# Patient Record
Sex: FEMALE | Race: WHITE | HISPANIC OR LATINO | Employment: OTHER | URBAN - METROPOLITAN AREA
[De-identification: names, ages, dates, MRNs, and addresses within clinical notes are randomized per-mention and may not be internally consistent; named-entity substitution may affect disease eponyms.]

---

## 2018-12-14 ENCOUNTER — HOSPITAL ENCOUNTER (INPATIENT)
Facility: MEDICAL CENTER | Age: 68
LOS: 12 days | DRG: 516 | End: 2018-12-26
Attending: FAMILY MEDICINE | Admitting: HOSPITALIST
Payer: MEDICARE

## 2018-12-14 ENCOUNTER — HOSPITAL ENCOUNTER (OUTPATIENT)
Dept: RADIOLOGY | Facility: MEDICAL CENTER | Age: 68
End: 2018-12-14

## 2018-12-14 ENCOUNTER — HOSPITAL ENCOUNTER (OUTPATIENT)
Facility: MEDICAL CENTER | Age: 68
DRG: 516 | End: 2018-12-14
Attending: FAMILY MEDICINE | Admitting: FAMILY MEDICINE
Payer: MEDICARE

## 2018-12-14 ENCOUNTER — APPOINTMENT (OUTPATIENT)
Dept: RADIOLOGY | Facility: MEDICAL CENTER | Age: 68
DRG: 516 | End: 2018-12-14
Attending: ORTHOPAEDIC SURGERY
Payer: MEDICARE

## 2018-12-14 DIAGNOSIS — E66.9 DIABETES MELLITUS TYPE 2 IN OBESE (HCC): ICD-10-CM

## 2018-12-14 DIAGNOSIS — S32.10XA CLOSED FRACTURE OF SACRUM, UNSPECIFIED PORTION OF SACRUM, INITIAL ENCOUNTER (HCC): ICD-10-CM

## 2018-12-14 DIAGNOSIS — J44.9 CHRONIC OBSTRUCTIVE PULMONARY DISEASE, UNSPECIFIED COPD TYPE (HCC): ICD-10-CM

## 2018-12-14 DIAGNOSIS — G25.81 RLS (RESTLESS LEGS SYNDROME): ICD-10-CM

## 2018-12-14 DIAGNOSIS — R74.8 ELEVATED ALKALINE PHOSPHATASE LEVEL: ICD-10-CM

## 2018-12-14 DIAGNOSIS — E11.69 DIABETES MELLITUS TYPE 2 IN OBESE (HCC): ICD-10-CM

## 2018-12-14 DIAGNOSIS — G89.29 OTHER CHRONIC PAIN: ICD-10-CM

## 2018-12-14 DIAGNOSIS — Z85.3 HISTORY OF BREAST CANCER: ICD-10-CM

## 2018-12-14 PROBLEM — N39.0 UTI (URINARY TRACT INFECTION): Status: ACTIVE | Noted: 2018-12-14

## 2018-12-14 PROBLEM — K21.9 GERD (GASTROESOPHAGEAL REFLUX DISEASE): Status: ACTIVE | Noted: 2018-12-14

## 2018-12-14 LAB
APTT PPP: 36.4 SEC (ref 24.7–36)
GLUCOSE BLD-MCNC: 121 MG/DL (ref 65–99)
GLUCOSE BLD-MCNC: 89 MG/DL (ref 65–99)
INR PPP: 1.05 (ref 0.87–1.13)
MAGNESIUM SERPL-MCNC: 1.6 MG/DL (ref 1.5–2.5)
PROTHROMBIN TIME: 13.8 SEC (ref 12–14.6)
TSH SERPL DL<=0.005 MIU/L-ACNC: 3.21 UIU/ML (ref 0.38–5.33)

## 2018-12-14 PROCEDURE — 99222 1ST HOSP IP/OBS MODERATE 55: CPT | Mod: AI | Performed by: HOSPITALIST

## 2018-12-14 PROCEDURE — 72192 CT PELVIS W/O DYE: CPT

## 2018-12-14 PROCEDURE — A9270 NON-COVERED ITEM OR SERVICE: HCPCS | Performed by: HOSPITALIST

## 2018-12-14 PROCEDURE — 770006 HCHG ROOM/CARE - MED/SURG/GYN SEMI*

## 2018-12-14 PROCEDURE — 700111 HCHG RX REV CODE 636 W/ 250 OVERRIDE (IP): Performed by: HOSPITALIST

## 2018-12-14 PROCEDURE — 700102 HCHG RX REV CODE 250 W/ 637 OVERRIDE(OP): Performed by: HOSPITALIST

## 2018-12-14 PROCEDURE — 700105 HCHG RX REV CODE 258

## 2018-12-14 PROCEDURE — 85730 THROMBOPLASTIN TIME PARTIAL: CPT

## 2018-12-14 PROCEDURE — 36415 COLL VENOUS BLD VENIPUNCTURE: CPT

## 2018-12-14 PROCEDURE — 87086 URINE CULTURE/COLONY COUNT: CPT

## 2018-12-14 PROCEDURE — 700105 HCHG RX REV CODE 258: Performed by: HOSPITALIST

## 2018-12-14 PROCEDURE — 85610 PROTHROMBIN TIME: CPT

## 2018-12-14 PROCEDURE — 83735 ASSAY OF MAGNESIUM: CPT

## 2018-12-14 PROCEDURE — 82962 GLUCOSE BLOOD TEST: CPT | Mod: 91

## 2018-12-14 PROCEDURE — 84443 ASSAY THYROID STIM HORMONE: CPT

## 2018-12-14 PROCEDURE — 83036 HEMOGLOBIN GLYCOSYLATED A1C: CPT

## 2018-12-14 RX ORDER — FUROSEMIDE 40 MG/1
40 TABLET ORAL 2 TIMES DAILY
Status: DISCONTINUED | OUTPATIENT
Start: 2018-12-14 | End: 2018-12-26 | Stop reason: HOSPADM

## 2018-12-14 RX ORDER — DEXTROSE MONOHYDRATE 25 G/50ML
25 INJECTION, SOLUTION INTRAVENOUS
Status: DISCONTINUED | OUTPATIENT
Start: 2018-12-14 | End: 2018-12-26 | Stop reason: HOSPADM

## 2018-12-14 RX ORDER — LOSARTAN POTASSIUM 50 MG/1
50 TABLET ORAL DAILY
Status: ON HOLD | COMMUNITY
End: 2019-05-29

## 2018-12-14 RX ORDER — TIZANIDINE 4 MG/1
2 TABLET ORAL 2 TIMES DAILY
Status: DISCONTINUED | OUTPATIENT
Start: 2018-12-14 | End: 2018-12-24

## 2018-12-14 RX ORDER — GABAPENTIN 300 MG/1
300 CAPSULE ORAL 2 TIMES DAILY
Status: ON HOLD | COMMUNITY
End: 2019-05-29

## 2018-12-14 RX ORDER — PRAVASTATIN SODIUM 10 MG
40 TABLET ORAL NIGHTLY
Status: DISCONTINUED | OUTPATIENT
Start: 2018-12-14 | End: 2018-12-26 | Stop reason: HOSPADM

## 2018-12-14 RX ORDER — SODIUM CHLORIDE 9 MG/ML
INJECTION, SOLUTION INTRAVENOUS
Status: COMPLETED
Start: 2018-12-14 | End: 2018-12-14

## 2018-12-14 RX ORDER — BISACODYL 10 MG
10 SUPPOSITORY, RECTAL RECTAL
Status: DISCONTINUED | OUTPATIENT
Start: 2018-12-14 | End: 2018-12-17

## 2018-12-14 RX ORDER — OMEPRAZOLE 20 MG/1
20 CAPSULE, DELAYED RELEASE ORAL DAILY
Status: ON HOLD | COMMUNITY
End: 2019-05-23

## 2018-12-14 RX ORDER — OMEPRAZOLE 20 MG/1
20 CAPSULE, DELAYED RELEASE ORAL DAILY
Status: DISCONTINUED | OUTPATIENT
Start: 2018-12-14 | End: 2018-12-26 | Stop reason: HOSPADM

## 2018-12-14 RX ORDER — HYDROCODONE BITARTRATE AND ACETAMINOPHEN 10; 325 MG/1; MG/1
1 TABLET ORAL EVERY 6 HOURS PRN
Status: DISCONTINUED | OUTPATIENT
Start: 2018-12-14 | End: 2018-12-15

## 2018-12-14 RX ORDER — ACETAMINOPHEN 325 MG/1
650 TABLET ORAL EVERY 6 HOURS PRN
Status: DISCONTINUED | OUTPATIENT
Start: 2018-12-14 | End: 2018-12-26 | Stop reason: HOSPADM

## 2018-12-14 RX ORDER — PRAMIPEXOLE DIHYDROCHLORIDE 0.25 MG/1
0.5 TABLET ORAL PRN
Status: ON HOLD | COMMUNITY
End: 2019-05-29

## 2018-12-14 RX ORDER — LOSARTAN POTASSIUM 50 MG/1
50 TABLET ORAL DAILY
Status: DISCONTINUED | OUTPATIENT
Start: 2018-12-14 | End: 2018-12-26 | Stop reason: HOSPADM

## 2018-12-14 RX ORDER — ONDANSETRON 4 MG/1
4 TABLET, ORALLY DISINTEGRATING ORAL EVERY 4 HOURS PRN
Status: DISCONTINUED | OUTPATIENT
Start: 2018-12-14 | End: 2018-12-26 | Stop reason: HOSPADM

## 2018-12-14 RX ORDER — GABAPENTIN 300 MG/1
300 CAPSULE ORAL 2 TIMES DAILY
Status: DISCONTINUED | OUTPATIENT
Start: 2018-12-14 | End: 2018-12-24

## 2018-12-14 RX ORDER — ONDANSETRON 2 MG/ML
4 INJECTION INTRAMUSCULAR; INTRAVENOUS EVERY 4 HOURS PRN
Status: DISCONTINUED | OUTPATIENT
Start: 2018-12-14 | End: 2018-12-17

## 2018-12-14 RX ORDER — PRAVASTATIN SODIUM 20 MG
40 TABLET ORAL NIGHTLY
Status: ON HOLD | COMMUNITY
End: 2019-05-23

## 2018-12-14 RX ORDER — PRAMIPEXOLE DIHYDROCHLORIDE 0.25 MG/1
0.25 TABLET ORAL NIGHTLY PRN
Status: DISCONTINUED | OUTPATIENT
Start: 2018-12-14 | End: 2018-12-24

## 2018-12-14 RX ORDER — FUROSEMIDE 40 MG/1
40 TABLET ORAL 2 TIMES DAILY
Status: ON HOLD | COMMUNITY
End: 2019-04-30

## 2018-12-14 RX ORDER — AMOXICILLIN 250 MG
2 CAPSULE ORAL 2 TIMES DAILY
Status: DISCONTINUED | OUTPATIENT
Start: 2018-12-14 | End: 2018-12-17

## 2018-12-14 RX ORDER — NAPROXEN 500 MG/1
500 TABLET ORAL
Status: ON HOLD | COMMUNITY
End: 2018-12-26

## 2018-12-14 RX ORDER — TRIAMCINOLONE ACETONIDE 1 MG/G
CREAM TOPICAL 2 TIMES DAILY
Status: ON HOLD | COMMUNITY
End: 2018-12-26

## 2018-12-14 RX ORDER — HYDROCODONE BITARTRATE AND ACETAMINOPHEN 10; 325 MG/1; MG/1
1 TABLET ORAL EVERY 6 HOURS PRN
Status: ON HOLD | COMMUNITY
End: 2018-12-26

## 2018-12-14 RX ORDER — TIZANIDINE 4 MG/1
2 TABLET ORAL 2 TIMES DAILY
Status: ON HOLD | COMMUNITY
End: 2019-04-30

## 2018-12-14 RX ORDER — POLYETHYLENE GLYCOL 3350 17 G/17G
1 POWDER, FOR SOLUTION ORAL
Status: DISCONTINUED | OUTPATIENT
Start: 2018-12-14 | End: 2018-12-17

## 2018-12-14 RX ADMIN — GABAPENTIN 300 MG: 300 CAPSULE ORAL at 16:40

## 2018-12-14 RX ADMIN — FUROSEMIDE 40 MG: 40 TABLET ORAL at 16:42

## 2018-12-14 RX ADMIN — OMEPRAZOLE 20 MG: 20 CAPSULE, DELAYED RELEASE ORAL at 15:43

## 2018-12-14 RX ADMIN — SODIUM CHLORIDE 500 ML: 9 INJECTION, SOLUTION INTRAVENOUS at 15:50

## 2018-12-14 RX ADMIN — TIZANIDINE 2 MG: 4 TABLET ORAL at 16:41

## 2018-12-14 RX ADMIN — LOSARTAN POTASSIUM 50 MG: 50 TABLET ORAL at 15:47

## 2018-12-14 RX ADMIN — PRAVASTATIN SODIUM 40 MG: 10 TABLET ORAL at 20:38

## 2018-12-14 RX ADMIN — HYDROCODONE BITARTRATE AND ACETAMINOPHEN 1 TABLET: 10; 325 TABLET ORAL at 15:42

## 2018-12-14 RX ADMIN — ENOXAPARIN SODIUM 40 MG: 100 INJECTION SUBCUTANEOUS at 15:35

## 2018-12-14 RX ADMIN — CEFTRIAXONE SODIUM 2 G: 2 INJECTION, POWDER, FOR SOLUTION INTRAMUSCULAR; INTRAVENOUS at 15:36

## 2018-12-14 RX ADMIN — STANDARDIZED SENNA CONCENTRATE AND DOCUSATE SODIUM 2 TABLET: 8.6; 5 TABLET, FILM COATED ORAL at 16:41

## 2018-12-14 ASSESSMENT — LIFESTYLE VARIABLES
CONSUMPTION TOTAL: NEGATIVE
ALCOHOL_USE: YES
HAVE PEOPLE ANNOYED YOU BY CRITICIZING YOUR DRINKING: NO
HOW MANY TIMES IN THE PAST YEAR HAVE YOU HAD 5 OR MORE DRINKS IN A DAY: 0
TOTAL SCORE: 0
TOTAL SCORE: 0
AVERAGE NUMBER OF DAYS PER WEEK YOU HAVE A DRINK CONTAINING ALCOHOL: 0
EVER HAD A DRINK FIRST THING IN THE MORNING TO STEADY YOUR NERVES TO GET RID OF A HANGOVER: NO
HAVE YOU EVER FELT YOU SHOULD CUT DOWN ON YOUR DRINKING: NO
EVER FELT BAD OR GUILTY ABOUT YOUR DRINKING: NO
TOTAL SCORE: 0
ON A TYPICAL DAY WHEN YOU DRINK ALCOHOL HOW MANY DRINKS DO YOU HAVE: 0

## 2018-12-14 ASSESSMENT — COGNITIVE AND FUNCTIONAL STATUS - GENERAL
HELP NEEDED FOR BATHING: A LITTLE
CLIMB 3 TO 5 STEPS WITH RAILING: TOTAL
PERSONAL GROOMING: A LITTLE
DRESSING REGULAR LOWER BODY CLOTHING: A LITTLE
SUGGESTED CMS G CODE MODIFIER MOBILITY: CM
TURNING FROM BACK TO SIDE WHILE IN FLAT BAD: A LITTLE
DRESSING REGULAR UPPER BODY CLOTHING: A LOT
MOBILITY SCORE: 8
WALKING IN HOSPITAL ROOM: TOTAL
DAILY ACTIVITIY SCORE: 17
SUGGESTED CMS G CODE MODIFIER DAILY ACTIVITY: CK
MOVING TO AND FROM BED TO CHAIR: UNABLE
TOILETING: A LOT
MOVING FROM LYING ON BACK TO SITTING ON SIDE OF FLAT BED: UNABLE
STANDING UP FROM CHAIR USING ARMS: TOTAL

## 2018-12-14 ASSESSMENT — PATIENT HEALTH QUESTIONNAIRE - PHQ9
SUM OF ALL RESPONSES TO PHQ9 QUESTIONS 1 AND 2: 0
1. LITTLE INTEREST OR PLEASURE IN DOING THINGS: NOT AT ALL
2. FEELING DOWN, DEPRESSED, IRRITABLE, OR HOPELESS: NOT AT ALL

## 2018-12-14 ASSESSMENT — PAIN SCALES - GENERAL
PAINLEVEL_OUTOF10: 6
PAINLEVEL_OUTOF10: 5
PAINLEVEL_OUTOF10: 8

## 2018-12-14 NOTE — PROGRESS NOTES
Direct admit from [facility], referred by Dr. Zuulagas. For pelvic fracture. Admitting MD is Dr. Loredo. Dr. Porter is consulting. ADT order signed and held, to be released upon patient's arrival on the unit. Patient arriving via ground. Page admit hospitalist on call for orders upon patient's arrival.

## 2018-12-15 ENCOUNTER — APPOINTMENT (OUTPATIENT)
Dept: RADIOLOGY | Facility: MEDICAL CENTER | Age: 68
DRG: 516 | End: 2018-12-15
Attending: HOSPITALIST
Payer: MEDICARE

## 2018-12-15 PROBLEM — R74.8 ELEVATED ALKALINE PHOSPHATASE LEVEL: Status: ACTIVE | Noted: 2018-12-15

## 2018-12-15 LAB
ALBUMIN SERPL BCP-MCNC: 3.1 G/DL (ref 3.2–4.9)
ALBUMIN/GLOB SERPL: 0.9 G/DL
ALP SERPL-CCNC: 170 U/L (ref 30–99)
ALT SERPL-CCNC: 23 U/L (ref 2–50)
ANION GAP SERPL CALC-SCNC: 5 MMOL/L (ref 0–11.9)
ANISOCYTOSIS BLD QL SMEAR: ABNORMAL
AST SERPL-CCNC: 35 U/L (ref 12–45)
BASOPHILS # BLD AUTO: 0.8 % (ref 0–1.8)
BASOPHILS # BLD: 0.05 K/UL (ref 0–0.12)
BILIRUB SERPL-MCNC: 0.4 MG/DL (ref 0.1–1.5)
BUN SERPL-MCNC: 13 MG/DL (ref 8–22)
CALCIUM SERPL-MCNC: 9.2 MG/DL (ref 8.5–10.5)
CHLORIDE SERPL-SCNC: 96 MMOL/L (ref 96–112)
CHOLEST SERPL-MCNC: 104 MG/DL (ref 100–199)
CO2 SERPL-SCNC: 29 MMOL/L (ref 20–33)
CREAT SERPL-MCNC: 0.72 MG/DL (ref 0.5–1.4)
DACRYOCYTES BLD QL SMEAR: NORMAL
EOSINOPHIL # BLD AUTO: 0.19 K/UL (ref 0–0.51)
EOSINOPHIL NFR BLD: 3.4 % (ref 0–6.9)
ERYTHROCYTE [DISTWIDTH] IN BLOOD BY AUTOMATED COUNT: 47.9 FL (ref 35.9–50)
EST. AVERAGE GLUCOSE BLD GHB EST-MCNC: 134 MG/DL
GLOBULIN SER CALC-MCNC: 3.5 G/DL (ref 1.9–3.5)
GLUCOSE BLD-MCNC: 124 MG/DL (ref 65–99)
GLUCOSE BLD-MCNC: 146 MG/DL (ref 65–99)
GLUCOSE BLD-MCNC: 89 MG/DL (ref 65–99)
GLUCOSE BLD-MCNC: 95 MG/DL (ref 65–99)
GLUCOSE SERPL-MCNC: 114 MG/DL (ref 65–99)
HBA1C MFR BLD: 6.3 % (ref 0–5.6)
HCT VFR BLD AUTO: 31.4 % (ref 37–47)
HDLC SERPL-MCNC: 29 MG/DL
HGB BLD-MCNC: 9.7 G/DL (ref 12–16)
LDLC SERPL CALC-MCNC: 53 MG/DL
LYMPHOCYTES # BLD AUTO: 0.88 K/UL (ref 1–4.8)
LYMPHOCYTES NFR BLD: 15.5 % (ref 22–41)
MANUAL DIFF BLD: NORMAL
MCH RBC QN AUTO: 24.8 PG (ref 27–33)
MCHC RBC AUTO-ENTMCNC: 30.9 G/DL (ref 33.6–35)
MCV RBC AUTO: 80.3 FL (ref 81.4–97.8)
METAMYELOCYTES NFR BLD MANUAL: 0.9 %
MICROCYTES BLD QL SMEAR: ABNORMAL
MONOCYTES # BLD AUTO: 0.3 K/UL (ref 0–0.85)
MONOCYTES NFR BLD AUTO: 5.2 % (ref 0–13.4)
MORPHOLOGY BLD-IMP: NORMAL
MYELOCYTES NFR BLD MANUAL: 0.9 %
NEUTROPHILS # BLD AUTO: 4.18 K/UL (ref 2–7.15)
NEUTROPHILS NFR BLD: 73.3 % (ref 44–72)
NRBC # BLD AUTO: 0 K/UL
NRBC BLD-RTO: 0 /100 WBC
OVALOCYTES BLD QL SMEAR: NORMAL
PLATELET # BLD AUTO: 287 K/UL (ref 164–446)
PLATELET BLD QL SMEAR: NORMAL
PMV BLD AUTO: 9.7 FL (ref 9–12.9)
POLYCHROMASIA BLD QL SMEAR: NORMAL
POTASSIUM SERPL-SCNC: 4.2 MMOL/L (ref 3.6–5.5)
PROT SERPL-MCNC: 6.6 G/DL (ref 6–8.2)
RBC # BLD AUTO: 3.91 M/UL (ref 4.2–5.4)
RBC BLD AUTO: PRESENT
SODIUM SERPL-SCNC: 130 MMOL/L (ref 135–145)
TRIGL SERPL-MCNC: 111 MG/DL (ref 0–149)
WBC # BLD AUTO: 5.7 K/UL (ref 4.8–10.8)

## 2018-12-15 PROCEDURE — 80053 COMPREHEN METABOLIC PANEL: CPT

## 2018-12-15 PROCEDURE — 85027 COMPLETE CBC AUTOMATED: CPT

## 2018-12-15 PROCEDURE — 700102 HCHG RX REV CODE 250 W/ 637 OVERRIDE(OP): Performed by: HOSPITALIST

## 2018-12-15 PROCEDURE — 770006 HCHG ROOM/CARE - MED/SURG/GYN SEMI*

## 2018-12-15 PROCEDURE — A9270 NON-COVERED ITEM OR SERVICE: HCPCS | Performed by: HOSPITALIST

## 2018-12-15 PROCEDURE — 80061 LIPID PANEL: CPT

## 2018-12-15 PROCEDURE — 85007 BL SMEAR W/DIFF WBC COUNT: CPT

## 2018-12-15 PROCEDURE — 700105 HCHG RX REV CODE 258: Performed by: HOSPITALIST

## 2018-12-15 PROCEDURE — 99232 SBSQ HOSP IP/OBS MODERATE 35: CPT | Performed by: HOSPITALIST

## 2018-12-15 PROCEDURE — 36415 COLL VENOUS BLD VENIPUNCTURE: CPT

## 2018-12-15 PROCEDURE — 700111 HCHG RX REV CODE 636 W/ 250 OVERRIDE (IP): Performed by: HOSPITALIST

## 2018-12-15 PROCEDURE — 82962 GLUCOSE BLOOD TEST: CPT

## 2018-12-15 RX ORDER — OXYCODONE HYDROCHLORIDE 10 MG/1
10 TABLET ORAL EVERY 4 HOURS PRN
Status: DISCONTINUED | OUTPATIENT
Start: 2018-12-15 | End: 2018-12-15

## 2018-12-15 RX ORDER — OXYCODONE HYDROCHLORIDE 5 MG/1
5 TABLET ORAL EVERY 4 HOURS PRN
Status: DISCONTINUED | OUTPATIENT
Start: 2018-12-15 | End: 2018-12-15

## 2018-12-15 RX ORDER — OXYCODONE HYDROCHLORIDE 5 MG/1
15 TABLET ORAL EVERY 4 HOURS PRN
Status: DISCONTINUED | OUTPATIENT
Start: 2018-12-15 | End: 2018-12-17

## 2018-12-15 RX ORDER — OXYCODONE HYDROCHLORIDE 10 MG/1
10 TABLET ORAL EVERY 4 HOURS PRN
Status: DISCONTINUED | OUTPATIENT
Start: 2018-12-15 | End: 2018-12-17

## 2018-12-15 RX ADMIN — STANDARDIZED SENNA CONCENTRATE AND DOCUSATE SODIUM 2 TABLET: 8.6; 5 TABLET, FILM COATED ORAL at 05:29

## 2018-12-15 RX ADMIN — FUROSEMIDE 40 MG: 40 TABLET ORAL at 05:29

## 2018-12-15 RX ADMIN — GABAPENTIN 300 MG: 300 CAPSULE ORAL at 17:31

## 2018-12-15 RX ADMIN — TIZANIDINE 2 MG: 4 TABLET ORAL at 17:31

## 2018-12-15 RX ADMIN — HYDROCODONE BITARTRATE AND ACETAMINOPHEN 1 TABLET: 10; 325 TABLET ORAL at 13:31

## 2018-12-15 RX ADMIN — OXYCODONE HYDROCHLORIDE 10 MG: 10 TABLET ORAL at 17:31

## 2018-12-15 RX ADMIN — FUROSEMIDE 40 MG: 40 TABLET ORAL at 17:31

## 2018-12-15 RX ADMIN — ACETAMINOPHEN 650 MG: 325 TABLET, FILM COATED ORAL at 16:21

## 2018-12-15 RX ADMIN — OXYCODONE HYDROCHLORIDE 15 MG: 5 TABLET ORAL at 20:55

## 2018-12-15 RX ADMIN — HYDROCODONE BITARTRATE AND ACETAMINOPHEN 1 TABLET: 10; 325 TABLET ORAL at 01:33

## 2018-12-15 RX ADMIN — HYDROCODONE BITARTRATE AND ACETAMINOPHEN 1 TABLET: 10; 325 TABLET ORAL at 07:33

## 2018-12-15 RX ADMIN — CEFTRIAXONE SODIUM 2 G: 2 INJECTION, POWDER, FOR SOLUTION INTRAMUSCULAR; INTRAVENOUS at 05:29

## 2018-12-15 RX ADMIN — STANDARDIZED SENNA CONCENTRATE AND DOCUSATE SODIUM 2 TABLET: 8.6; 5 TABLET, FILM COATED ORAL at 17:31

## 2018-12-15 RX ADMIN — LOSARTAN POTASSIUM 50 MG: 50 TABLET ORAL at 05:29

## 2018-12-15 RX ADMIN — ENOXAPARIN SODIUM 40 MG: 100 INJECTION SUBCUTANEOUS at 05:29

## 2018-12-15 RX ADMIN — OMEPRAZOLE 20 MG: 20 CAPSULE, DELAYED RELEASE ORAL at 05:29

## 2018-12-15 RX ADMIN — GABAPENTIN 300 MG: 300 CAPSULE ORAL at 05:29

## 2018-12-15 RX ADMIN — PRAVASTATIN SODIUM 40 MG: 10 TABLET ORAL at 20:55

## 2018-12-15 RX ADMIN — TIZANIDINE 2 MG: 4 TABLET ORAL at 05:29

## 2018-12-15 ASSESSMENT — PAIN SCALES - GENERAL
PAINLEVEL_OUTOF10: 8
PAINLEVEL_OUTOF10: 10
PAINLEVEL_OUTOF10: 6
PAINLEVEL_OUTOF10: 7
PAINLEVEL_OUTOF10: 9

## 2018-12-15 ASSESSMENT — ENCOUNTER SYMPTOMS
WHEEZING: 0
SHORTNESS OF BREATH: 0
FEVER: 0
CONSTIPATION: 0
DIARRHEA: 0
HEADACHES: 0
COUGH: 0
CHILLS: 0
NAUSEA: 0
VOMITING: 0

## 2018-12-15 NOTE — PROGRESS NOTES
Transferred for sacral fracture  CT scan with reconstructions ordered to define fracture pattern and determine if surgical intervention required

## 2018-12-15 NOTE — ASSESSMENT & PLAN NOTE
Previously followed with oncologist Dr. Casillas in [facility], last saw them 1-2 years ago.  Ct suspicious for pathologic fx .  Bone scan abnormal uptake n regions of degenerative changes and chronic arthritis.   To follow up with her oncologist. May need further evaluation with PET scan.

## 2018-12-15 NOTE — ASSESSMENT & PLAN NOTE
Bone scan - non specific for metastatic because the increase uptake noted in regions of fx and degenerative areas.    Outpatient follow-up recommended with oncology for possible PET scan.

## 2018-12-15 NOTE — PROGRESS NOTES
[facility] Medicine Daily Progress Note    Date of Service  12/15/2018    Chief Complaint  68 y.o. female admitted 12/14/2018 with right sacral fracture    Interval Problem Update  12/15: CT shows sacral fracture likely pathological.  Bone scan pending.    Disposition  Pending workup of sacral fracture  Patient previously followed with Dr. Casillas in [facility]    Review of Systems  Review of Systems   Constitutional: Negative for chills and fever.   Respiratory: Negative for cough, shortness of breath and wheezing.    Cardiovascular: Negative for chest pain.   Gastrointestinal: Negative for constipation, diarrhea, nausea and vomiting.   Genitourinary: Negative for dysuria.   Musculoskeletal: Positive for joint pain.   Neurological: Negative for headaches.      Physical Exam  Temp:  [36.7 °C (98.1 °F)-37.5 °C (99.5 °F)] 36.9 °C (98.4 °F)  Pulse:  [] 73  Resp:  [15-18] 17  BP: (102-159)/(53-89) 114/56    Physical Exam   Constitutional: She appears well-developed.   HENT:   Head: Normocephalic.   Eyes: Conjunctivae are normal.   Cardiovascular: Tachycardia present.  Exam reveals no gallop.    Pulmonary/Chest: No respiratory distress. She has no wheezes.   Abdominal: She exhibits no distension. There is no tenderness.   Musculoskeletal: She exhibits tenderness.   Neurological: She is alert.       Fluids    Intake/Output Summary (Last 24 hours) at 12/15/18 0956  Last data filed at 12/15/18 0900   Gross per 24 hour   Intake             1010 ml   Output             1400 ml   Net             -390 ml       Laboratory  Recent Labs      12/15/18   0318   WBC  5.7   RBC  3.91*   HEMOGLOBIN  9.7*   HEMATOCRIT  31.4*   MCV  80.3*   MCH  24.8*   MCHC  30.9*   RDW  47.9   PLATELETCT  287   MPV  9.7     Recent Labs      12/15/18   0318   SODIUM  130*   POTASSIUM  4.2   CHLORIDE  96   CO2  29   GLUCOSE  114*   BUN  13   CREATININE  0.72   CALCIUM  9.2     Recent Labs      12/14/18   1528   APTT  36.4*   INR  1.05          Recent Labs      12/15/18   0318   TRIGLYCERIDE  111   HDL  29*   LDL  53       Imaging  CT-PELVIS W/O PLUS RECONS   Final Result      1.  Right sacral fracture highly likely to be pathologic secondary to underlying malignancy      2.  Also, possible abnormal bone within the right ilium which could indicate malignancy.      3.  Osteoarthritis of both hip joint, both sacroiliac joints, lumbar spine facet joint.      4.  Prior hysterectomy      5.  Abdominal aortic atherosclerotic plaque      6.  Colonic diverticulosis      OUTSIDE IMAGES-US VASCULAR   Final Result      CT-FOREIGN FILM CAT SCAN   Final Result      OUTSIDE IMAGES-DX PELVIS   Final Result      NM-BONE SCAN WHOLE BODY    (Results Pending)        Assessment/Plan  Elevated alkaline phosphatase level- (present on admission)   Assessment & Plan    -Possibly due to bone metastases     RLS (restless legs syndrome)- (present on admission)   Assessment & Plan    -Chronic     GERD (gastroesophageal reflux disease)- (present on admission)   Assessment & Plan    -Continue omeprazole     Diabetes mellitus type 2 in obese (HCC)- (present on admission)   Assessment & Plan    -Continue metformin  -Continue sliding scale insulin     Chronic pain- (present on admission)   Assessment & Plan    -Continue gabapentin and tizanidine  -Norco as needed     History of breast cancer- (present on admission)   Assessment & Plan    -Previously followed with oncologist Dr. Casillas in [facility], last saw them 1-2 years ago     COPD (chronic obstructive pulmonary disease) ([facility])- (present on admission)   Assessment & Plan    -Not in exacerbation     UTI (urinary tract infection)- (present on admission)   Assessment & Plan    -Started on ceftriaxone  -Urine culture pending     Sacral fracture, closed (HCC)- (present on admission)   Assessment & Plan    -Confirmed on CT scan  -Orthopedic surgeon Dr. Porter consulted  -Likely pathological from breast cancer metastases  -Bone  scan pending          VTE prophylaxis: enoxaparin

## 2018-12-15 NOTE — PROGRESS NOTES
Patient back on unit. Per nuclear medicine, unable to complete bone scan due to intense pain. Will update MD. Patient resting comfortably in bed, distraction in use.

## 2018-12-15 NOTE — RESPIRATORY CARE
COPD EDUCATION by COPD CLINICAL EDUCATOR  12/15/2018 at 7:23 AM by Shanell Orr     Patient reviewed by COPD education team. Patient does not qualify for COPD program.

## 2018-12-15 NOTE — H&P
DATE OF ADMISSION:  12/14/2018    DATE OF SERVICE:  12/14/2018    IDENTIFICATION:  This is a 68-year-old female from [place], [place].    CHIEF COMPLAINT:  Fall and right hip and pelvic pain.    HISTORY OF PRESENT ILLNESS:  A 68-year-old female who apparently had several   falls was trying to manage with pain medication and took a handful of   medication at once, which made her lethargic.  She had another fall.  The   patient presents to [place] emergency department where the patient is   evaluated and found to have a sacral fracture, which is comminuted.  The   patient was also found with some bone lucencies concerning for possible   pathologic fracture, referred to [facility] for evaluation, especially care with   orthopedic surgery, Dr. Porter on-call.  The patient arrives here as a   direct admission.  The patient is in significant pain and is tearful.  She   states that she has had several falls.  She has had no other injury.  She   states that she had a history of DVT, which was treated.  She does have COPD,   osteoarthritis, chronic pain, dyslipidemia, diabetes and restless leg   syndrome.  Patient will be admitted for further evaluation and treatment.  Dr. Porter notified of the patient's arrival.    ALLERGIES:  No known drug allergies.    MEDICATIONS:  Regimen on admission as follows:  1.  Lasix 40 mg b.i.d. for chronic lower extremity swelling.  2.  Neurontin 300 mg p.o. b.i.d.  3.  Norco 10/325 one tablet q.6 hours p.r.n.  4.  Cozaar 50 mg daily.  5.  Glucophage 500 mg p.o. b.i.d.  6.  Naprosyn 500 mg p.o. b.i.d.  7.  Omeprazole 20 mg daily.  8.  Mirapex 0.25 mg p.r.n.  9.  Pravachol 40 mg daily.  10.  Xarelto, apparently is completed.  11.  Zanaflex 4 mg half tablet b.i.d. p.r.n.  12.  Kenalog cream to affected areas.    PAST MEDICAL HISTORY:  1.  Osteoarthritis.  2.  COPD.  3.  History of DVT.  4.  History of breast cancer 10 years ago, left mastectomy and reconstruction   and antihormonal  therapy.  5.  Chronic pain syndrome.  6.  Hypertension.  7.  Dyslipidemia.  8.  Diabetes type 2.  9.  GERD.  10.  Restless leg syndrome.    PAST SURGICAL HISTORY:  1.  History of left mastectomy and reconstruction.  2.  History of knee surgery.  3.  History of hysterectomy.    SOCIAL HISTORY:  The patient is a nonsmoker, nondrinker.  She states she has a   history of remote drug use.  She is  and lives in [place], [place].    FAMILY HISTORY:  Positive for diabetes in her father with lymphoma.  Her   mother apparently  of old age.    REVIEW OF SYSTEMS:  She denies any fevers, has had some mild chills.  HEENT:  No headache, no visual changes, no difficulty with swallowing or sore   throat.  CARDIOVASCULAR:  Without chest pain, palpitation, PND or orthopnea.  LUNGS:  Without cough.  She has some intermittent mild wheezing.  GASTROINTESTINAL:  No nausea, vomiting, diarrhea.  GENITOURINARY:  No dysuria, hematuria.  She does have some frequency.  NEUROLOGIC:  Without focal weakness, numbness or tingling.  She does have some   sensory loss in lower extremities.  MUSCULOSKELETAL:  With right-sided hip and pelvic pain.    PHYSICAL EXAMINATION:  VITAL SIGNS:  The patient is found to have temperature 36.7, pulse 94,   respirations 16, blood pressure 159/89.  The patient is saturating 93% on room   air.  GENERAL:  This is an elderly female, overweight, in no acute distress, no   acute respiratory distress.  Well developed, well nourished.  HEENT:  Normocephalic, atraumatic.  EOMI.  PERRLA.  Mucous membranes are   moist.  Nasopharynx clear.  NECK:  Stiff range of motion.  No lymphadenopathy or thyromegaly.  CHEST:  Normal bony structures.  LUNGS:  Clear to auscultation anteriorly.  HEART:  Regular rate.  S1 and S2 are normal.  No S3, S4.  ABDOMEN:  Protuberant.  There is no tenderness, no distention.  GENITOURINARY:  Normal external female genitalia.  RECTAL:  Deferred.  MUSCULOSKELETAL:  No clubbing, cyanosis, has  lower extremity edema   bilaterally, 2+ pitting.  NEUROLOGIC:  The patient is alert and oriented x4.  Cranial nerves II-XII are   grossly intact.  No sensory loss in the upper extremities.  Lower extremity   show some sensory loss in a stocking-glove fashion.    LABORATORY DATA AND IMAGING:  Primarily from the outlying facility showing   sodium of 132, potassium 4.5, chloride 96, bicarbonate 28, glucose is 118, BUN   is 20, creatinine 0.7, AST 74, alkaline phosphatase 218.  CBC is essentially   normal.  Her x-ray hip was negative.  Ultrasound shows no right lower   extremity DVT.  A CT shows a sacral fracture on the right, comminuted, just   some femoral head lucency.  Bone scan is advised.  Here, magnesium is 1.6,   glucose 121.  INR 1.05.  TSH 3.2.  CT pelvis repeat ordered by Dr. Porter   currently pending.    ASSESSMENT AND PLAN:  1.  Right sacral fracture.  The patient referred to orthopedic surgery for   evaluation and further assessment.  A bone scan is ordered to evaluate for   possible pathologic fracture possibility with a history of breast cancer.  2.  Lytic lesions? on CT.  Again, a bone scan will be ordered.  3.  Urinary tract infection.  The patient treated symptomatically as well as   with antibiotics.  We will culture and follow up closely.  4.  Diabetes.  Continue medication regimen and tight control.  5.  History of deep venous thrombosis, appears to have resolved.  6.  Chronic obstructive pulmonary disease, oxygen support, respiratory   protocol.  7.  Chronic osteoarthritis and pain.  8.  Gastroesophageal reflux disease.  9.  Restless leg syndrome.  10.  Dietary obesity.    OVERALL PLAN:  The patient is admitted with a sacral fracture following falls.    The patient is referred to orthopedic surgery for evaluation and possible   surgical intervention versus conservative treatment.  She will be otherwise   evaluated in terms of the patient's abnormal CT findings and urinary tract   infection.   For full further details, please refer to the computer system and   paper chart.  The patient will likely require a 2+ overnight stay in the   hospital.  She is medically complex.    Time spent 65 minutes.       ____________________________________     MD NICKY BERNSTEIN / JUAN    DD:  12/14/2018 18:51:13  DT:  12/14/2018 20:20:17    D#:  [id number]  Job#:  [id number]

## 2018-12-15 NOTE — PROGRESS NOTES
2 RN skin check complete per Rancho Score of 17.    Findings:  · Redness to bilateral upper and lower extremities, pink and blanching  · Scattered bruising to bilateral upper and lower extremities  · No other skin breakdown noted at this time    Interventions:  · Waffle overlay in place  · Encouraging Q 2 turns and frequent repositioning, patient tolerates well  · Extra pillows for support/positioning  · Floating heels  · Perineal care  · Moisturizer/barrier cream

## 2018-12-15 NOTE — PROGRESS NOTES
Patient arrived at 1315, direct admit from [facility], received report from RN Vanda, admit consent signed, height, weight, and vitals obtained. Med rec complete per paper chart. Hospitalist Dr. Parish to come to bedside, SUDHEER Weaver notified regarding patient's arrival to unit. Transferred safely from [facility] to bed. Performed 2 RN skin check, redness noted to bilateral upper and lower extremities, blanching, scattered bruising also noted to bilateral upper and lower extremities, payne in place, placed from [place] facility, intact, urine present, 18 g left AC noted to left arm, intact and patent.

## 2018-12-15 NOTE — PROGRESS NOTES
Notified Dr. Parish to clarify need for payne, received telephone order to discontinue payne. Also reported HR at 116bpm to 120s with no new order and just to continue monitoring HR at this time.

## 2018-12-15 NOTE — ASSESSMENT & PLAN NOTE
post Transsacral screw fixation, right sacral fracture, S1 and S2 on 12-17.  Uncontrolled right hip pain.   Bone scan with findings of uptake in fracture site and over bone degenerative regions and joints.  Apparently no bone bx was done and confirmed with Path - no sample available.  Less likely metastatic dz- Discussed findings with patient .  States she will follow up with Dr. Casillas her oncologist in [place], [place] who has been following her for her Breast Cancer following treatment.     Improved pain - continue  IV Dilaudid, Oxycodone, toradol and Zaniflex .   Patient has been accepted at [facility] likely discharge tomorrow

## 2018-12-16 LAB
ALBUMIN SERPL BCP-MCNC: 3.2 G/DL (ref 3.2–4.9)
BACTERIA UR CULT: NORMAL
BUN SERPL-MCNC: 12 MG/DL (ref 8–22)
CALCIUM SERPL-MCNC: 9 MG/DL (ref 8.5–10.5)
CHLORIDE SERPL-SCNC: 96 MMOL/L (ref 96–112)
CO2 SERPL-SCNC: 30 MMOL/L (ref 20–33)
CREAT SERPL-MCNC: 0.65 MG/DL (ref 0.5–1.4)
ERYTHROCYTE [DISTWIDTH] IN BLOOD BY AUTOMATED COUNT: 48.2 FL (ref 35.9–50)
GLUCOSE BLD-MCNC: 120 MG/DL (ref 65–99)
GLUCOSE BLD-MCNC: 170 MG/DL (ref 65–99)
GLUCOSE BLD-MCNC: 92 MG/DL (ref 65–99)
GLUCOSE BLD-MCNC: 93 MG/DL (ref 65–99)
GLUCOSE SERPL-MCNC: 91 MG/DL (ref 65–99)
HCT VFR BLD AUTO: 33.7 % (ref 37–47)
HGB BLD-MCNC: 10.2 G/DL (ref 12–16)
MCH RBC QN AUTO: 24.5 PG (ref 27–33)
MCHC RBC AUTO-ENTMCNC: 30.3 G/DL (ref 33.6–35)
MCV RBC AUTO: 81 FL (ref 81.4–97.8)
PHOSPHATE SERPL-MCNC: 3.7 MG/DL (ref 2.5–4.5)
PLATELET # BLD AUTO: 272 K/UL (ref 164–446)
PMV BLD AUTO: 9.6 FL (ref 9–12.9)
POTASSIUM SERPL-SCNC: 4.2 MMOL/L (ref 3.6–5.5)
RBC # BLD AUTO: 4.16 M/UL (ref 4.2–5.4)
SIGNIFICANT IND 70042: NORMAL
SITE SITE: NORMAL
SODIUM SERPL-SCNC: 134 MMOL/L (ref 135–145)
SOURCE SOURCE: NORMAL
WBC # BLD AUTO: 6.1 K/UL (ref 4.8–10.8)

## 2018-12-16 PROCEDURE — A9270 NON-COVERED ITEM OR SERVICE: HCPCS | Performed by: HOSPITALIST

## 2018-12-16 PROCEDURE — 85027 COMPLETE CBC AUTOMATED: CPT

## 2018-12-16 PROCEDURE — 700102 HCHG RX REV CODE 250 W/ 637 OVERRIDE(OP): Performed by: HOSPITALIST

## 2018-12-16 PROCEDURE — 82962 GLUCOSE BLOOD TEST: CPT | Mod: 91

## 2018-12-16 PROCEDURE — 51798 US URINE CAPACITY MEASURE: CPT

## 2018-12-16 PROCEDURE — 770006 HCHG ROOM/CARE - MED/SURG/GYN SEMI*

## 2018-12-16 PROCEDURE — 80069 RENAL FUNCTION PANEL: CPT

## 2018-12-16 PROCEDURE — 36415 COLL VENOUS BLD VENIPUNCTURE: CPT

## 2018-12-16 PROCEDURE — 700111 HCHG RX REV CODE 636 W/ 250 OVERRIDE (IP): Performed by: HOSPITALIST

## 2018-12-16 PROCEDURE — 700105 HCHG RX REV CODE 258: Performed by: HOSPITALIST

## 2018-12-16 PROCEDURE — 99232 SBSQ HOSP IP/OBS MODERATE 35: CPT | Performed by: HOSPITALIST

## 2018-12-16 RX ORDER — DEXAMETHASONE SODIUM PHOSPHATE 4 MG/ML
4 INJECTION, SOLUTION INTRA-ARTICULAR; INTRALESIONAL; INTRAMUSCULAR; INTRAVENOUS; SOFT TISSUE EVERY 8 HOURS
Status: DISCONTINUED | OUTPATIENT
Start: 2018-12-16 | End: 2018-12-18

## 2018-12-16 RX ADMIN — DEXAMETHASONE SODIUM PHOSPHATE 4 MG: 4 INJECTION, SOLUTION INTRA-ARTICULAR; INTRALESIONAL; INTRAMUSCULAR; INTRAVENOUS; SOFT TISSUE at 14:08

## 2018-12-16 RX ADMIN — ENOXAPARIN SODIUM 40 MG: 100 INJECTION SUBCUTANEOUS at 05:37

## 2018-12-16 RX ADMIN — PRAVASTATIN SODIUM 40 MG: 10 TABLET ORAL at 20:22

## 2018-12-16 RX ADMIN — OXYCODONE HYDROCHLORIDE 15 MG: 5 TABLET ORAL at 20:22

## 2018-12-16 RX ADMIN — DEXAMETHASONE SODIUM PHOSPHATE 4 MG: 4 INJECTION, SOLUTION INTRA-ARTICULAR; INTRALESIONAL; INTRAMUSCULAR; INTRAVENOUS; SOFT TISSUE at 20:22

## 2018-12-16 RX ADMIN — FUROSEMIDE 40 MG: 40 TABLET ORAL at 05:38

## 2018-12-16 RX ADMIN — STANDARDIZED SENNA CONCENTRATE AND DOCUSATE SODIUM 2 TABLET: 8.6; 5 TABLET, FILM COATED ORAL at 05:38

## 2018-12-16 RX ADMIN — TIZANIDINE 2 MG: 4 TABLET ORAL at 05:37

## 2018-12-16 RX ADMIN — FUROSEMIDE 40 MG: 40 TABLET ORAL at 16:51

## 2018-12-16 RX ADMIN — OXYCODONE HYDROCHLORIDE 10 MG: 10 TABLET ORAL at 10:00

## 2018-12-16 RX ADMIN — STANDARDIZED SENNA CONCENTRATE AND DOCUSATE SODIUM 2 TABLET: 8.6; 5 TABLET, FILM COATED ORAL at 16:51

## 2018-12-16 RX ADMIN — OXYCODONE HYDROCHLORIDE 10 MG: 10 TABLET ORAL at 16:51

## 2018-12-16 RX ADMIN — LOSARTAN POTASSIUM 50 MG: 50 TABLET ORAL at 05:38

## 2018-12-16 RX ADMIN — OXYCODONE HYDROCHLORIDE 15 MG: 5 TABLET ORAL at 01:03

## 2018-12-16 RX ADMIN — OMEPRAZOLE 20 MG: 20 CAPSULE, DELAYED RELEASE ORAL at 05:38

## 2018-12-16 RX ADMIN — OXYCODONE HYDROCHLORIDE 15 MG: 5 TABLET ORAL at 05:38

## 2018-12-16 RX ADMIN — ACETAMINOPHEN 650 MG: 325 TABLET, FILM COATED ORAL at 17:36

## 2018-12-16 RX ADMIN — CEFTRIAXONE SODIUM 2 G: 2 INJECTION, POWDER, FOR SOLUTION INTRAMUSCULAR; INTRAVENOUS at 05:39

## 2018-12-16 RX ADMIN — PRAMIPEXOLE DIHYDROCHLORIDE 0.25 MG: 0.25 TABLET ORAL at 01:03

## 2018-12-16 RX ADMIN — GABAPENTIN 300 MG: 300 CAPSULE ORAL at 05:38

## 2018-12-16 RX ADMIN — INSULIN HUMAN 2 UNITS: 100 INJECTION, SOLUTION PARENTERAL at 20:28

## 2018-12-16 RX ADMIN — TIZANIDINE 2 MG: 4 TABLET ORAL at 16:51

## 2018-12-16 RX ADMIN — GABAPENTIN 300 MG: 300 CAPSULE ORAL at 16:51

## 2018-12-16 RX ADMIN — ACETAMINOPHEN 650 MG: 325 TABLET, FILM COATED ORAL at 01:04

## 2018-12-16 ASSESSMENT — PATIENT HEALTH QUESTIONNAIRE - PHQ9
1. LITTLE INTEREST OR PLEASURE IN DOING THINGS: NOT AT ALL
2. FEELING DOWN, DEPRESSED, IRRITABLE, OR HOPELESS: NOT AT ALL
SUM OF ALL RESPONSES TO PHQ9 QUESTIONS 1 AND 2: 0
2. FEELING DOWN, DEPRESSED, IRRITABLE, OR HOPELESS: NOT AT ALL
1. LITTLE INTEREST OR PLEASURE IN DOING THINGS: NOT AT ALL
SUM OF ALL RESPONSES TO PHQ9 QUESTIONS 1 AND 2: 0

## 2018-12-16 ASSESSMENT — PAIN SCALES - GENERAL
PAINLEVEL_OUTOF10: 6
PAINLEVEL_OUTOF10: 8
PAINLEVEL_OUTOF10: 8
PAINLEVEL_OUTOF10: 7
PAINLEVEL_OUTOF10: 10
PAINLEVEL_OUTOF10: 6
PAINLEVEL_OUTOF10: 8
PAINLEVEL_OUTOF10: 6

## 2018-12-16 ASSESSMENT — ENCOUNTER SYMPTOMS
NAUSEA: 0
CONSTIPATION: 0
CHILLS: 0
HEADACHES: 0
COUGH: 0
VOMITING: 0
DIARRHEA: 0
WHEEZING: 0
SHORTNESS OF BREATH: 0
FEVER: 0

## 2018-12-16 NOTE — PROGRESS NOTES
2 RN skin check complete per Rancho Score of 17.     Findings:  · Redness to bilateral upper and lower extremities, pink and blanching  · Scattered bruising to bilateral upper and lower extremities and trunk  · No other skin breakdown noted at this time     Interventions:  · Waffle overlay in place  · Encouraging Q 2 turns and frequent repositioning, patient tolerates well  · Extra pillows for support/positioning  · Floating heels  · Perineal care  · Moisturizer/barrier cream

## 2018-12-16 NOTE — PROGRESS NOTES
[facility] Medicine Daily Progress Note    Date of Service  12/16/2018    Chief Complaint  68 y.o. female admitted 12/14/2018 with right sacral fracture    Interval Problem Update  12/15: CT shows sacral fracture likely pathological.  Bone scan pending.  12/16: Patient having polyuria.  Bladder scan negative.  Ordered bone scan with anesthesia.  Patient continuing to have pelvic pain, consulted palliative for further pain management.    Disposition  Pending workup of sacral fracture  Patient previously followed with Dr. Casillas in [facility]    Review of Systems  Review of Systems   Constitutional: Positive for malaise/fatigue. Negative for chills and fever.   Respiratory: Negative for cough, shortness of breath and wheezing.    Cardiovascular: Negative for chest pain.   Gastrointestinal: Negative for constipation, diarrhea, nausea and vomiting.   Genitourinary: Positive for frequency. Negative for dysuria.   Musculoskeletal: Positive for joint pain.   Neurological: Negative for headaches.      Physical Exam  Temp:  [36.2 °C (97.2 °F)-37.6 °C (99.6 °F)] 37.6 °C (99.6 °F)  Pulse:  [83-99] 99  Resp:  [16-19] 19  BP: (107-134)/(49-67) 129/64    Physical Exam   Constitutional: She appears well-developed.   HENT:   Head: Normocephalic.   Eyes: Conjunctivae are normal.   Cardiovascular: Tachycardia present.  Exam reveals no gallop.    Pulmonary/Chest: No respiratory distress. She has no wheezes.   Abdominal: She exhibits no distension. There is no tenderness.   Musculoskeletal: She exhibits tenderness.   Neurological: She is alert.   Skin: Skin is dry.       Fluids    Intake/Output Summary (Last 24 hours) at 12/16/18 1014  Last data filed at 12/16/18 0945   Gross per 24 hour   Intake             1020 ml   Output                0 ml   Net             1020 ml       Laboratory  Recent Labs      12/15/18   0318  12/16/18   0551   WBC  5.7  6.1   RBC  3.91*  4.16*   HEMOGLOBIN  9.7*  10.2*   HEMATOCRIT  31.4*  33.7*   MCV   80.3*  81.0*   MCH  24.8*  24.5*   MCHC  30.9*  30.3*   RDW  47.9  48.2   PLATELETCT  287  272   MPV  9.7  9.6     Recent Labs      12/15/18   0318  12/16/18   0551   SODIUM  130*  134*   POTASSIUM  4.2  4.2   CHLORIDE  96  96   CO2  29  30   GLUCOSE  114*  91   BUN  13  12   CREATININE  0.72  0.65   CALCIUM  9.2  9.0     Recent Labs      12/14/18   1528   APTT  36.4*   INR  1.05         Recent Labs      12/15/18   0318   TRIGLYCERIDE  111   HDL  29*   LDL  53       Imaging  CT-PELVIS W/O PLUS RECONS   Final Result      1.  Right sacral fracture highly likely to be pathologic secondary to underlying malignancy      2.  Also, possible abnormal bone within the right ilium which could indicate malignancy.      3.  Osteoarthritis of both hip joint, both sacroiliac joints, lumbar spine facet joint.      4.  Prior hysterectomy      5.  Abdominal aortic atherosclerotic plaque      6.  Colonic diverticulosis      OUTSIDE IMAGES-US VASCULAR   Final Result      CT-FOREIGN FILM CAT SCAN   Final Result      OUTSIDE IMAGES-DX PELVIS   Final Result      NM-BONE SCAN WHOLE BODY    (Results Pending)        Assessment/Plan  Elevated alkaline phosphatase level- (present on admission)   Assessment & Plan    -Possibly due to bone metastases     RLS (restless legs syndrome)- (present on admission)   Assessment & Plan    -Chronic     GERD (gastroesophageal reflux disease)- (present on admission)   Assessment & Plan    -Continue omeprazole     Diabetes mellitus type 2 in obese (HCC)- (present on admission)   Assessment & Plan    -Continue metformin  -Continue sliding scale insulin     Chronic pain- (present on admission)   Assessment & Plan    -Continue gabapentin and tizanidine  -Norco as needed     History of breast cancer- (present on admission)   Assessment & Plan    -Previously followed with oncologist Dr. Casillas in [facility], last saw them 1-2 years ago     COPD (chronic obstructive pulmonary disease) (HCC)- (present on  admission)   Assessment & Plan    -Not in exacerbation     UTI (urinary tract infection)- (present on admission)   Assessment & Plan    -Having symptoms of polyuria  -Started on ceftriaxone  -Urine culture pending     Sacral fracture, closed (HCC)- (present on admission)   Assessment & Plan    -Confirmed on CT scan  -Orthopedic surgeon Dr. Porter consulted  -Likely pathological from breast cancer metastases  -Bone scan pending  -Palliative consulted for pain control          VTE prophylaxis: enoxaparin

## 2018-12-16 NOTE — PROGRESS NOTES
Spoke with nuclear medicine, unable to complete bone scan until tomorrow (12/17) due to anesthesia dosing. Updated patient on plan of care and answered all questions.

## 2018-12-16 NOTE — PROGRESS NOTES
Records reviewed. 67 yo F with right sacral Fx, likely pathologic with significant pain. Recommend starting IV dexamethasone 4 mg q 8 h. Dr Perrin notified. I will see patient tomorrow.

## 2018-12-16 NOTE — PALLIATIVE CARE
Palliative Care follow-up  Received consult for pain management. Dr Orellana updated and will see pt tomorrow, 12/17/2018.     Updated: MD    Thank you for allowing Palliative Care to participate in this patient's care. Please feel free to call [phone] with any questions or concerns.

## 2018-12-17 ENCOUNTER — APPOINTMENT (OUTPATIENT)
Dept: RADIOLOGY | Facility: MEDICAL CENTER | Age: 68
DRG: 516 | End: 2018-12-17
Attending: ORTHOPAEDIC SURGERY
Payer: MEDICARE

## 2018-12-17 LAB
ALBUMIN SERPL BCP-MCNC: 3.2 G/DL (ref 3.2–4.9)
BUN SERPL-MCNC: 18 MG/DL (ref 8–22)
CALCIUM SERPL-MCNC: 9.6 MG/DL (ref 8.5–10.5)
CHLORIDE SERPL-SCNC: 94 MMOL/L (ref 96–112)
CO2 SERPL-SCNC: 29 MMOL/L (ref 20–33)
CREAT SERPL-MCNC: 0.72 MG/DL (ref 0.5–1.4)
ERYTHROCYTE [DISTWIDTH] IN BLOOD BY AUTOMATED COUNT: 45.3 FL (ref 35.9–50)
GLUCOSE BLD-MCNC: 130 MG/DL (ref 65–99)
GLUCOSE BLD-MCNC: 134 MG/DL (ref 65–99)
GLUCOSE BLD-MCNC: 136 MG/DL (ref 65–99)
GLUCOSE BLD-MCNC: 162 MG/DL (ref 65–99)
GLUCOSE SERPL-MCNC: 175 MG/DL (ref 65–99)
HCT VFR BLD AUTO: 33.8 % (ref 37–47)
HGB BLD-MCNC: 10.4 G/DL (ref 12–16)
MCH RBC QN AUTO: 24.8 PG (ref 27–33)
MCHC RBC AUTO-ENTMCNC: 30.8 G/DL (ref 33.6–35)
MCV RBC AUTO: 80.5 FL (ref 81.4–97.8)
PHOSPHATE SERPL-MCNC: 4.1 MG/DL (ref 2.5–4.5)
PLATELET # BLD AUTO: 272 K/UL (ref 164–446)
PMV BLD AUTO: 10 FL (ref 9–12.9)
POTASSIUM SERPL-SCNC: 4.2 MMOL/L (ref 3.6–5.5)
RBC # BLD AUTO: 4.2 M/UL (ref 4.2–5.4)
SODIUM SERPL-SCNC: 131 MMOL/L (ref 135–145)
WBC # BLD AUTO: 3.5 K/UL (ref 4.8–10.8)

## 2018-12-17 PROCEDURE — 80069 RENAL FUNCTION PANEL: CPT

## 2018-12-17 PROCEDURE — 502240 HCHG MISC OR SUPPLY RC 0272: Performed by: ORTHOPAEDIC SURGERY

## 2018-12-17 PROCEDURE — 36415 COLL VENOUS BLD VENIPUNCTURE: CPT

## 2018-12-17 PROCEDURE — 700111 HCHG RX REV CODE 636 W/ 250 OVERRIDE (IP): Performed by: HOSPITALIST

## 2018-12-17 PROCEDURE — 700105 HCHG RX REV CODE 258: Performed by: HOSPITALIST

## 2018-12-17 PROCEDURE — 99221 1ST HOSP IP/OBS SF/LOW 40: CPT | Performed by: INTERNAL MEDICINE

## 2018-12-17 PROCEDURE — 160036 HCHG PACU - EA ADDL 30 MINS PHASE I: Performed by: ORTHOPAEDIC SURGERY

## 2018-12-17 PROCEDURE — A9270 NON-COVERED ITEM OR SERVICE: HCPCS | Performed by: ORTHOPAEDIC SURGERY

## 2018-12-17 PROCEDURE — A9270 NON-COVERED ITEM OR SERVICE: HCPCS | Performed by: HOSPITALIST

## 2018-12-17 PROCEDURE — A6222 GAUZE <=16 IN NO W/SAL W/O B: HCPCS | Performed by: ORTHOPAEDIC SURGERY

## 2018-12-17 PROCEDURE — 501838 HCHG SUTURE GENERAL: Performed by: ORTHOPAEDIC SURGERY

## 2018-12-17 PROCEDURE — 99497 ADVNCD CARE PLAN 30 MIN: CPT | Mod: 25 | Performed by: INTERNAL MEDICINE

## 2018-12-17 PROCEDURE — 700112 HCHG RX REV CODE 229: Performed by: ORTHOPAEDIC SURGERY

## 2018-12-17 PROCEDURE — 110371 HCHG SHELL REV 272: Performed by: ORTHOPAEDIC SURGERY

## 2018-12-17 PROCEDURE — 85027 COMPLETE CBC AUTOMATED: CPT

## 2018-12-17 PROCEDURE — 160009 HCHG ANES TIME/MIN: Performed by: ORTHOPAEDIC SURGERY

## 2018-12-17 PROCEDURE — 160002 HCHG RECOVERY MINUTES (STAT): Performed by: ORTHOPAEDIC SURGERY

## 2018-12-17 PROCEDURE — 160042 HCHG SURGERY MINUTES - EA ADDL 1 MIN LEVEL 5: Performed by: ORTHOPAEDIC SURGERY

## 2018-12-17 PROCEDURE — 160048 HCHG OR STATISTICAL LEVEL 1-5: Performed by: ORTHOPAEDIC SURGERY

## 2018-12-17 PROCEDURE — 501445 HCHG STAPLER, SKIN DISP: Performed by: ORTHOPAEDIC SURGERY

## 2018-12-17 PROCEDURE — 160031 HCHG SURGERY MINUTES - 1ST 30 MINS LEVEL 5: Performed by: ORTHOPAEDIC SURGERY

## 2018-12-17 PROCEDURE — 72190 X-RAY EXAM OF PELVIS: CPT

## 2018-12-17 PROCEDURE — 700102 HCHG RX REV CODE 250 W/ 637 OVERRIDE(OP): Performed by: HOSPITALIST

## 2018-12-17 PROCEDURE — 82962 GLUCOSE BLOOD TEST: CPT

## 2018-12-17 PROCEDURE — 99232 SBSQ HOSP IP/OBS MODERATE 35: CPT | Performed by: HOSPITALIST

## 2018-12-17 PROCEDURE — C1713 ANCHOR/SCREW BN/BN,TIS/BN: HCPCS | Performed by: ORTHOPAEDIC SURGERY

## 2018-12-17 PROCEDURE — 700111 HCHG RX REV CODE 636 W/ 250 OVERRIDE (IP)

## 2018-12-17 PROCEDURE — A6402 STERILE GAUZE <= 16 SQ IN: HCPCS | Performed by: ORTHOPAEDIC SURGERY

## 2018-12-17 PROCEDURE — 700102 HCHG RX REV CODE 250 W/ 637 OVERRIDE(OP): Performed by: ORTHOPAEDIC SURGERY

## 2018-12-17 PROCEDURE — 700111 HCHG RX REV CODE 636 W/ 250 OVERRIDE (IP): Performed by: ANESTHESIOLOGY

## 2018-12-17 PROCEDURE — 770006 HCHG ROOM/CARE - MED/SURG/GYN SEMI*

## 2018-12-17 PROCEDURE — 160035 HCHG PACU - 1ST 60 MINS PHASE I: Performed by: ORTHOPAEDIC SURGERY

## 2018-12-17 PROCEDURE — 700111 HCHG RX REV CODE 636 W/ 250 OVERRIDE (IP): Performed by: ORTHOPAEDIC SURGERY

## 2018-12-17 PROCEDURE — 0QH134Z INSERTION OF INTERNAL FIXATION DEVICE INTO SACRUM, PERCUTANEOUS APPROACH: ICD-10-PCS | Performed by: ORTHOPAEDIC SURGERY

## 2018-12-17 DEVICE — IMPLANTABLE DEVICE: Type: IMPLANTABLE DEVICE | Site: PELVIS | Status: FUNCTIONAL

## 2018-12-17 DEVICE — WASHER FOR 7.3MM CANNULATED SCREWS 13.0MM  (3TX18=54) (6EA/PK): Type: IMPLANTABLE DEVICE | Site: PELVIS | Status: FUNCTIONAL

## 2018-12-17 RX ORDER — HALOPERIDOL 5 MG/ML
1 INJECTION INTRAMUSCULAR
Status: DISCONTINUED | OUTPATIENT
Start: 2018-12-17 | End: 2018-12-17 | Stop reason: HOSPADM

## 2018-12-17 RX ORDER — CEFAZOLIN SODIUM 1 G/50ML
1 INJECTION, SOLUTION INTRAVENOUS EVERY 8 HOURS
Status: COMPLETED | OUTPATIENT
Start: 2018-12-17 | End: 2018-12-18

## 2018-12-17 RX ORDER — HYDROMORPHONE HYDROCHLORIDE 1 MG/ML
0.1 INJECTION, SOLUTION INTRAMUSCULAR; INTRAVENOUS; SUBCUTANEOUS
Status: DISCONTINUED | OUTPATIENT
Start: 2018-12-17 | End: 2018-12-17 | Stop reason: HOSPADM

## 2018-12-17 RX ORDER — KETOROLAC TROMETHAMINE 30 MG/ML
15 INJECTION, SOLUTION INTRAMUSCULAR; INTRAVENOUS EVERY 6 HOURS
Status: COMPLETED | OUTPATIENT
Start: 2018-12-17 | End: 2018-12-20

## 2018-12-17 RX ORDER — OXYCODONE HYDROCHLORIDE 5 MG/1
5 TABLET ORAL
Status: DISCONTINUED | OUTPATIENT
Start: 2018-12-17 | End: 2018-12-26 | Stop reason: HOSPADM

## 2018-12-17 RX ORDER — ENEMA 19; 7 G/133ML; G/133ML
1 ENEMA RECTAL
Status: DISCONTINUED | OUTPATIENT
Start: 2018-12-17 | End: 2018-12-26

## 2018-12-17 RX ORDER — DIPHENHYDRAMINE HYDROCHLORIDE 50 MG/ML
25 INJECTION INTRAMUSCULAR; INTRAVENOUS EVERY 6 HOURS PRN
Status: DISCONTINUED | OUTPATIENT
Start: 2018-12-17 | End: 2018-12-26

## 2018-12-17 RX ORDER — SCOLOPAMINE TRANSDERMAL SYSTEM 1 MG/1
1 PATCH, EXTENDED RELEASE TRANSDERMAL
Status: DISCONTINUED | OUTPATIENT
Start: 2018-12-17 | End: 2018-12-26

## 2018-12-17 RX ORDER — MEPERIDINE HYDROCHLORIDE 25 MG/ML
12.5 INJECTION INTRAMUSCULAR; INTRAVENOUS; SUBCUTANEOUS
Status: DISCONTINUED | OUTPATIENT
Start: 2018-12-17 | End: 2018-12-17 | Stop reason: HOSPADM

## 2018-12-17 RX ORDER — POLYETHYLENE GLYCOL 3350 17 G/17G
1 POWDER, FOR SOLUTION ORAL 2 TIMES DAILY PRN
Status: DISCONTINUED | OUTPATIENT
Start: 2018-12-17 | End: 2018-12-26 | Stop reason: HOSPADM

## 2018-12-17 RX ORDER — OXYCODONE HYDROCHLORIDE 10 MG/1
10 TABLET ORAL
Status: DISCONTINUED | OUTPATIENT
Start: 2018-12-17 | End: 2018-12-24

## 2018-12-17 RX ORDER — DOCUSATE SODIUM 100 MG/1
100 CAPSULE, LIQUID FILLED ORAL 2 TIMES DAILY
Status: DISCONTINUED | OUTPATIENT
Start: 2018-12-17 | End: 2018-12-26 | Stop reason: HOSPADM

## 2018-12-17 RX ORDER — HYDROMORPHONE HYDROCHLORIDE 1 MG/ML
0.2 INJECTION, SOLUTION INTRAMUSCULAR; INTRAVENOUS; SUBCUTANEOUS
Status: DISCONTINUED | OUTPATIENT
Start: 2018-12-17 | End: 2018-12-17 | Stop reason: HOSPADM

## 2018-12-17 RX ORDER — BISACODYL 10 MG
10 SUPPOSITORY, RECTAL RECTAL
Status: DISCONTINUED | OUTPATIENT
Start: 2018-12-17 | End: 2018-12-26 | Stop reason: HOSPADM

## 2018-12-17 RX ORDER — SODIUM CHLORIDE, SODIUM LACTATE, POTASSIUM CHLORIDE, CALCIUM CHLORIDE 600; 310; 30; 20 MG/100ML; MG/100ML; MG/100ML; MG/100ML
INJECTION, SOLUTION INTRAVENOUS CONTINUOUS
Status: DISCONTINUED | OUTPATIENT
Start: 2018-12-17 | End: 2018-12-17 | Stop reason: HOSPADM

## 2018-12-17 RX ORDER — HALOPERIDOL 5 MG/ML
1 INJECTION INTRAMUSCULAR EVERY 6 HOURS PRN
Status: DISCONTINUED | OUTPATIENT
Start: 2018-12-17 | End: 2018-12-26

## 2018-12-17 RX ORDER — HYDROMORPHONE HYDROCHLORIDE 1 MG/ML
0.5 INJECTION, SOLUTION INTRAMUSCULAR; INTRAVENOUS; SUBCUTANEOUS
Status: DISCONTINUED | OUTPATIENT
Start: 2018-12-17 | End: 2018-12-26

## 2018-12-17 RX ORDER — DIPHENHYDRAMINE HYDROCHLORIDE 50 MG/ML
12.5 INJECTION INTRAMUSCULAR; INTRAVENOUS
Status: DISCONTINUED | OUTPATIENT
Start: 2018-12-17 | End: 2018-12-17 | Stop reason: HOSPADM

## 2018-12-17 RX ORDER — ONDANSETRON 2 MG/ML
4 INJECTION INTRAMUSCULAR; INTRAVENOUS
Status: DISCONTINUED | OUTPATIENT
Start: 2018-12-17 | End: 2018-12-17 | Stop reason: HOSPADM

## 2018-12-17 RX ORDER — AMOXICILLIN 250 MG
1 CAPSULE ORAL
Status: DISCONTINUED | OUTPATIENT
Start: 2018-12-17 | End: 2018-12-26 | Stop reason: HOSPADM

## 2018-12-17 RX ORDER — ACETAMINOPHEN 325 MG/1
650 TABLET ORAL EVERY 6 HOURS
Status: COMPLETED | OUTPATIENT
Start: 2018-12-17 | End: 2018-12-22

## 2018-12-17 RX ORDER — ONDANSETRON 2 MG/ML
4 INJECTION INTRAMUSCULAR; INTRAVENOUS EVERY 4 HOURS PRN
Status: DISCONTINUED | OUTPATIENT
Start: 2018-12-17 | End: 2018-12-26

## 2018-12-17 RX ORDER — AMOXICILLIN 250 MG
1 CAPSULE ORAL NIGHTLY
Status: DISCONTINUED | OUTPATIENT
Start: 2018-12-17 | End: 2018-12-26 | Stop reason: HOSPADM

## 2018-12-17 RX ORDER — DEXAMETHASONE SODIUM PHOSPHATE 4 MG/ML
4 INJECTION, SOLUTION INTRA-ARTICULAR; INTRALESIONAL; INTRAMUSCULAR; INTRAVENOUS; SOFT TISSUE
Status: DISCONTINUED | OUTPATIENT
Start: 2018-12-17 | End: 2018-12-18

## 2018-12-17 RX ORDER — POLYETHYLENE GLYCOL 3350 17 G/17G
1 POWDER, FOR SOLUTION ORAL DAILY
Status: DISCONTINUED | OUTPATIENT
Start: 2018-12-17 | End: 2018-12-26 | Stop reason: HOSPADM

## 2018-12-17 RX ADMIN — DEXAMETHASONE SODIUM PHOSPHATE 4 MG: 4 INJECTION, SOLUTION INTRA-ARTICULAR; INTRALESIONAL; INTRAMUSCULAR; INTRAVENOUS; SOFT TISSUE at 05:44

## 2018-12-17 RX ADMIN — CEFTRIAXONE SODIUM 2 G: 2 INJECTION, POWDER, FOR SOLUTION INTRAMUSCULAR; INTRAVENOUS at 05:43

## 2018-12-17 RX ADMIN — STANDARDIZED SENNA CONCENTRATE AND DOCUSATE SODIUM 2 TABLET: 8.6; 5 TABLET, FILM COATED ORAL at 05:44

## 2018-12-17 RX ADMIN — TIZANIDINE 2 MG: 4 TABLET ORAL at 05:46

## 2018-12-17 RX ADMIN — TIZANIDINE 2 MG: 4 TABLET ORAL at 16:54

## 2018-12-17 RX ADMIN — OMEPRAZOLE 20 MG: 20 CAPSULE, DELAYED RELEASE ORAL at 05:43

## 2018-12-17 RX ADMIN — FENTANYL CITRATE 50 MCG: 50 INJECTION, SOLUTION INTRAMUSCULAR; INTRAVENOUS at 13:29

## 2018-12-17 RX ADMIN — ENOXAPARIN SODIUM 40 MG: 100 INJECTION SUBCUTANEOUS at 23:47

## 2018-12-17 RX ADMIN — GABAPENTIN 300 MG: 300 CAPSULE ORAL at 16:55

## 2018-12-17 RX ADMIN — ACETAMINOPHEN 650 MG: 325 TABLET, FILM COATED ORAL at 23:46

## 2018-12-17 RX ADMIN — LOSARTAN POTASSIUM 50 MG: 50 TABLET ORAL at 05:44

## 2018-12-17 RX ADMIN — FUROSEMIDE 40 MG: 40 TABLET ORAL at 16:56

## 2018-12-17 RX ADMIN — HYDROMORPHONE HYDROCHLORIDE 0.2 MG: 1 INJECTION, SOLUTION INTRAMUSCULAR; INTRAVENOUS; SUBCUTANEOUS at 14:08

## 2018-12-17 RX ADMIN — PRAVASTATIN SODIUM 40 MG: 10 TABLET ORAL at 20:31

## 2018-12-17 RX ADMIN — DEXAMETHASONE SODIUM PHOSPHATE 4 MG: 4 INJECTION, SOLUTION INTRA-ARTICULAR; INTRALESIONAL; INTRAMUSCULAR; INTRAVENOUS; SOFT TISSUE at 20:33

## 2018-12-17 RX ADMIN — FUROSEMIDE 40 MG: 40 TABLET ORAL at 05:43

## 2018-12-17 RX ADMIN — ACETAMINOPHEN 650 MG: 325 TABLET, FILM COATED ORAL at 16:55

## 2018-12-17 RX ADMIN — KETOROLAC TROMETHAMINE 15 MG: 30 INJECTION, SOLUTION INTRAMUSCULAR at 16:58

## 2018-12-17 RX ADMIN — GABAPENTIN 300 MG: 300 CAPSULE ORAL at 05:44

## 2018-12-17 RX ADMIN — ENOXAPARIN SODIUM 40 MG: 100 INJECTION SUBCUTANEOUS at 05:43

## 2018-12-17 RX ADMIN — OXYCODONE HYDROCHLORIDE 15 MG: 5 TABLET ORAL at 01:48

## 2018-12-17 RX ADMIN — KETOROLAC TROMETHAMINE 15 MG: 30 INJECTION, SOLUTION INTRAMUSCULAR at 23:46

## 2018-12-17 RX ADMIN — OXYCODONE HYDROCHLORIDE 15 MG: 5 TABLET ORAL at 05:43

## 2018-12-17 RX ADMIN — CEFAZOLIN SODIUM 1 G: 1 INJECTION, SOLUTION INTRAVENOUS at 20:33

## 2018-12-17 RX ADMIN — OXYCODONE HYDROCHLORIDE 15 MG: 5 TABLET ORAL at 10:19

## 2018-12-17 RX ADMIN — INSULIN HUMAN 2 UNITS: 100 INJECTION, SOLUTION PARENTERAL at 17:05

## 2018-12-17 RX ADMIN — OXYCODONE HYDROCHLORIDE 10 MG: 10 TABLET ORAL at 20:33

## 2018-12-17 RX ADMIN — DOCUSATE SODIUM 100 MG: 100 CAPSULE, LIQUID FILLED ORAL at 16:56

## 2018-12-17 RX ADMIN — HALOPERIDOL LACTATE 1 MG: 5 INJECTION, SOLUTION INTRAMUSCULAR at 14:18

## 2018-12-17 ASSESSMENT — PAIN SCALES - GENERAL
PAINLEVEL_OUTOF10: 6
PAINLEVEL_OUTOF10: 4
PAINLEVEL_OUTOF10: 4
PAINLEVEL_OUTOF10: 7
PAINLEVEL_OUTOF10: 5
PAINLEVEL_OUTOF10: 4
PAINLEVEL_OUTOF10: 9
PAINLEVEL_OUTOF10: 4
PAINLEVEL_OUTOF10: 4
PAINLEVEL_OUTOF10: 9
PAINLEVEL_OUTOF10: 3
PAINLEVEL_OUTOF10: 4
PAINLEVEL_OUTOF10: 3
PAINLEVEL_OUTOF10: 3

## 2018-12-17 ASSESSMENT — ENCOUNTER SYMPTOMS
CHILLS: 0
VOMITING: 0
FEVER: 1
HEADACHES: 0
NAUSEA: 0
SHORTNESS OF BREATH: 0
COUGH: 0

## 2018-12-17 NOTE — CONSULTS
12/17/2018    Muriel Watkins is a 68 y.o. female who presents with a right sacral fracture and is here for operative management. Patient denies numbness, parasthesias, loss of concousness or other trauma    Past Medical History:   Diagnosis Date   • Diabetes (HCC)     Record received from [facility] 12/14/18   • GERD (gastroesophageal reflux disease)     Record received from [facility] 12/14/18   • Hyperlipidemia     Record received from [facility] 12/14/18   • Hypertension     Record received from [facility] 12/14/18       No past surgical history on file.    Medications  No current facility-administered medications on file prior to encounter.      No current outpatient prescriptions on file prior to encounter.       Allergies  Patient has no known allergies.    ROS  Right pelvis pain. All other systems were reviewed and found to be negative    No family history on file.    Social History     Social History   • Marital status:      Spouse name: N/A   • Number of children: N/A   • Years of education: N/A     Social History Main Topics   • Smoking status: Not on file   • Smokeless tobacco: Not on file   • Alcohol use Not on file   • Drug use: Unknown   • Sexual activity: Not on file     Other Topics Concern   • Not on file     Social History Narrative   • No narrative on file       Physical Exam  Vitals  Blood pressure (!) 163/85, pulse 60, temperature 36.5 °C (97.7 °F), temperature source Temporal, resp. rate 16, height 1.524 m (5'), weight 77.8 kg (171 lb 8.3 oz), SpO2 98 %, not currently breastfeeding.  General: Well Developed, Well Nourished, no acute distress  HEENT: Normocephalic, atraumatic  Eyes: Anicteric, PERRLA, EOMI  Neck: Supple, nontender, no masses  Lungs: CTA, no wheezes or crackles  Heart: RRR, no murmurs, rubs or gallops  Abdomen: Soft, NT, ND  Pelvis: Stable to AP and Lateral Compression  Skin: Intact, no open wounds  Extremities: LLE  Neuro: NVI  Vascular: 2+DP/PT, Capillary  refill <2 seconds    Radiographs:  CT-PELVIS W/O PLUS RECONS   Final Result      1.  Right sacral fracture highly likely to be pathologic secondary to underlying malignancy      2.  Also, possible abnormal bone within the right ilium which could indicate malignancy.      3.  Osteoarthritis of both hip joint, both sacroiliac joints, lumbar spine facet joint.      4.  Prior hysterectomy      5.  Abdominal aortic atherosclerotic plaque      6.  Colonic diverticulosis      OUTSIDE IMAGES-US VASCULAR   Final Result      CT-FOREIGN FILM CAT SCAN   Final Result      OUTSIDE IMAGES-DX PELVIS   Final Result      NM-BONE SCAN WHOLE BODY    (Results Pending)   DX-PELVIS-TRAUMA SERIES  3-    (Results Pending)   DX-PORTABLE FLUORO > 1 HOUR    (Results Pending)       Laboratory Values  Recent Labs      12/15/18   0318  12/16/18   0551  12/17/18   0419   WBC  5.7  6.1  3.5*   RBC  3.91*  4.16*  4.20   HEMOGLOBIN  9.7*  10.2*  10.4*   HEMATOCRIT  31.4*  33.7*  33.8*   MCV  80.3*  81.0*  80.5*   MCH  24.8*  24.5*  24.8*   MCHC  30.9*  30.3*  30.8*   RDW  47.9  48.2  45.3   PLATELETCT  287  272  272   MPV  9.7  9.6  10.0     Recent Labs      12/15/18   0318  12/16/18   0551  12/17/18   0419   SODIUM  130*  134*  131*   POTASSIUM  4.2  4.2  4.2   CHLORIDE  96  96  94*   CO2  29  30  29   GLUCOSE  114*  91  175*   BUN  13 12  18     Recent Labs      12/14/18   1528   APTT  36.4*   INR  1.05         Impression: Right sacral fracture possibly pathologic    Plan:Operative intervention. Risks and benefits of surgery were discussed which include but are not limited to bleeding, infection, neurovascular damage, malunion, nonunion, instability, limb length discrepancy, DVT, PE, MI, Stroke and death. They understand these risks and wish to proceed.

## 2018-12-17 NOTE — PROGRESS NOTES
Patient complaint of feeling warm and sweaty. Oral temperature of 100.9F. Administered PRN Tylenol. Applied cool wash cloths.

## 2018-12-17 NOTE — OP REPORT
DATE OF SERVICE:  12/17/2018    PREOPERATIVE DIAGNOSIS:  Right sacral fracture.    POSTOPERATIVE DIAGNOSIS:  Right sacral fracture.    PROCEDURE:  Transsacral screw fixation, right sacral fracture, S1 and S2.    NAME OF SURGEON:  Cody Norman MD    ASSISTANT:  Mc Valdez PA-C    ESTIMATED BLOOD LOSS:  None.    INDICATIONS:  This is a 68-year-old with suspected metastatic disease with a   pathologic sacral fracture.  Risks and benefits of stabilization were   discussed.  She has been unable to mobilize since the time of injury.  She   understands the possibility of bleeding, infection, neurovascular damage,   pain, stiffness, malunion, nonunion, DVT, PE, MI, stroke, and death.  They   understand all these risks and wished to proceed.    DESCRIPTION OF PROCEDURE:  Patient was sedated with LMA anesthesia and   administered perioperative antibiotics.  The right hip was prepped and draped   in usual fashion and 2 guide pins for OIC 7.0 cannulated screws were inserted,   1 in the body of S1 and 1 in the body of S2 crossing the sacral fracture.    Two Transsacral fully threaded screws were then placed.  Screws were confirmed   to be within bony corridors through both views.  Wounds were irrigated,   closed with staples.  Sterile dressings were applied.  Patient tolerated the   procedure well.    POSTOPERATIVE PLAN:  Patient to be admitted, toe touch weightbearing on the   right lower extremity.       ____________________________________     CODY NORMAN MD    SILKE / NTS    DD:  12/17/2018 13:00:52  DT:  12/17/2018 14:14:44    D#:  [id number]  Job#:  [id number]

## 2018-12-17 NOTE — PROGRESS NOTES
ORTHOPAEDIC SURGERY PROGRESS NOTE     Patient seen and examined. Pain not well controlled, unable to sufficiently mobilize. Discomfort not improving.   Hx BRCA, last seen onc 1-2yrs ago with apparently clean scans, however nothing available for review.       Blood pressure 132/61, pulse 64, temperature 36.3 °C (97.3 °F), temperature source Temporal, resp. rate 15, height 1.524 m (5'), weight 77.8 kg (171 lb 8.3 oz), SpO2 98 %, not currently breastfeeding.    Laboratory Values  Recent Labs      12/15/18   0318  12/16/18   0551  12/17/18   0419   WBC  5.7  6.1  3.5*   RBC  3.91*  4.16*  4.20   HEMOGLOBIN  9.7*  10.2*  10.4*   HEMATOCRIT  31.4*  33.7*  33.8*   MCV  80.3*  81.0*  80.5*   MCH  24.8*  24.5*  24.8*   MCHC  30.9*  30.3*  30.8*   RDW  47.9  48.2  45.3   PLATELETCT  287  272  272   MPV  9.7  9.6  10.0     Recent Labs      12/15/18   0318  12/16/18   0551  12/17/18   0419   SODIUM  130*  134*  131*   POTASSIUM  4.2  4.2  4.2   CHLORIDE  96  96  94*   CO2  29  30  29   GLUCOSE  114*  91  175*   BUN  13  12  18     Recent Labs      12/14/18   1528   APTT  36.4*   INR  1.05         Physical Exam  Gen: no acute distress, nontoxic  Extremities:  Pelvis/RLE:   -exquisite pain low back/right sacrum    -negative log roll, extremity atraumatic    -SILT DP/SP/saph/sural nerves   -+TA/EHL/GCS/Peroneals   -all compartments soft and compressible   -skin warm and well-perfused, brisk capillary refill      Assessment  68 status post multiple falls  -complete right sacral fracture, insufficiency versus pathologic given hx BRCA  -gait instability, unable to mobilize, persistent pain  -COPD  -DM2      Plan  Admission: inpatient  Antibiotics: per primary for UTI, been on rocephin  Analgesia: multimodal   Activity/WB: TTWB RLE, PT/OT  Anticoagulation: lovenox, please hold for OR today  Diet: NPO for surgery today  -discussed treatment options with patient at length, given protracted hospital course mobilizing due to pain,  consideration given for stabilization with percutaneous posterior pelvic ring fixation, patient agreeable to proceeding  -bone scan still pending, despite presence of possible metastatic involvement surgery may assist in providing palliative relief, will attempt bone biopsy sampling intraop as well  -please call with questions       Boom Villarreal DO, MSc

## 2018-12-17 NOTE — OR NURSING
PO carbs and oxy mask started per ERAS protocol at 1310. Medicated with pain meds as ordered. Dressing to right hip CDI. Palpapble pedal pulses bilat. Strength good in bilat legs. Assisted with bedpan but unable to void. Pt crying and moaning, but states that pain is 3/10 and tolerable.

## 2018-12-17 NOTE — CONSULTS
DATE OF SERVICE:  12/17/2018    PALLIATIVE CARE CONSULTATION    REQUESTING PHYSICIAN:  Tao Perrin MD    CONSULTING PHYSICIAN:  Lilian Orellana MD    REASON FOR CONSULTATION:  Right hip pain, advance care planning, goals of   care.    HISTORY OF PRESENT ILLNESS:  The patient is a 68-year-old female who reports   that she has had increasing left foot pain, ankle pain as well as increasing   right hip pain over the past several months.  She describes going to   [facility] numerous times and only receiving opioids and sent   home.  Apparently, they did do x-rays, but there were no abnormalities found.    The patient reports that she has been given multiple different opioids   including morphine and hydrocodone, which she has taken according to her   prescription.  She states that the opioids have done nothing for her pain, but   have made her very sleepy and as a consequence of that, she has fallen   several times.  She said one day she actually fell out of her chair 5 times.    She presented again to [facility] on 12/13 with right hip pain after   a fall.  She was found to have a sacral fracture, which was comminuted at that   time and she was transferred to [facility] for a higher   level of care.  Since hospitalization, the patient has received several doses   of oxycodone IR, which the patient reports has done nothing for her pain.    Yesterday, I did request that Dr. Perrin place the patient on IV dexamethasone at   4 mg IV every 8 hours.  The patient reports that since starting the   dexamethasone, she has had a 60% improvement in her pain.  She reports that   her pain level is around 6 at this point and that she is able to move and sit   up.  She said when she arrived here, the pain was excruciating to the point of   tears.  Of note is that the patient has not had a bowel movement for 2 days.    REVIEW OF SYSTEMS:  Pain as described above.  Constipation as described  above.    She denies shortness of breath.  No nausea or vomiting.  No significant   insomnia.  No anxiety, no depression, no dysphagia or odynophagia.  No cardiac   type chest pain.  No dysuria, although she has had frequency of urination,   reports that it was due to having excess fluid on board.    PAST MEDICAL HISTORY:  Remarkable for a history of DVT, COPD, osteoarthritis.    She has a history of breast cancer 10 years ago, left mastectomy with   reconstruction and anti-hormonal therapy.  She has a history of chronic pain,   hypertension, dyslipidemia, diabetes, gastroesophageal reflux disease, and   restless legs syndrome.    PAST SURGICAL HISTORY:  She has a left ankle surgery with fusion 5 years ago,   left mastectomy with reconstruction 10 years ago, knee surgery, and a   hysterectomy.    SOCIAL HISTORY:  She is .  Her 's name is Sean Martini.  They   have been  for 10 years, but together for 40 years.  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  She has a 45-year history of tobacco   abuse, but quit 10 years ago when she was diagnosed with breast cancer.  She   has an occasional alcoholic beverage.  She has no children.    FAMILY HISTORY:  Her father had diabetes and lymphoma.  Her mother lived a   very long life, but passed away 3 years ago.    ALLERGIES:  No known medical allergies.    MEDICATIONS:  Currently are oxycodone IR 15 mg, she takes about 3 doses a day;   Rocephin 2 g IV q. 24 hours, dexamethasone 4 mg IV q. 8 hours, Lasix 40 mg   p.o. b.i.d., gabapentin 30 mg p.o. b.i.d., Humulin R, Cozaar 50 mg daily,   Prilosec 20 mg daily, pravastatin 40 mg daily, Senna Plus 2 p.o. b.i.d., and   Zanaflex 2 mg b.i.d.    PERTINENT LABORATORY DATA:  Alkaline phosphatase is 218.  H and H is 10.4 and   33.8, white blood cell count is 3.5.  Sodium 131, albumin is 3.2 with a   calcium of 9.6.    PHYSICAL EXAMINATION:  VITAL SIGNS:  BMI is 33.5.  Blood pressure is 132/61, heart  rate 64, pulse ox   on 2 liters is 98%, temperature is 97.3.  GENERAL:  She is alert, awake, and oriented x4.  She is cooperative, currently   in no acute distress, well developed, well nourished.  HEENT:  Pupils are 3.5 mm and reactive.  Extraocular muscles are intact.    Conjunctivae are slightly pale, but not icteric.  Mouth, mucous membranes are   moist.  No exudates or lesions are noted.  CARDIAC:  S1, S2.  No murmur noted.  LUNGS:  Diffusely decreased breath sounds.  No wheezes or crackles noted.  ABDOMEN:  Obese, soft, nontender, no organomegaly.  CHEST:  Left breast exam, no palpable masses.  Left axilla, no masses.  EXTREMITIES:  She has scarring of the left ankle.  No edema noted.  No range   of motion done of the hips due to the patient go to surgery for pinning.  SKIN:  She has multiple ecchymotic areas in her both arms.    SPIRITUAL HISTORY:  The patient states that she is [employer].  She does not go   to Pentec[employer]ism currently because there is no Pentecostalism in her area.  She says that she   is a very spiritual person.  She notes that recently since she has been   having such increased pain in her hip, she had visitations from her mother who   has passed away, which has been comforting to her.    ADVANCED CARE PLANNING AND GOALS OF CARE:  The patient at this point is   unaware that there is a possibility of cancer.  She does know that she is   going to surgery for pinning of her hip.  She is not even aware that a biopsy   might taken.  She was very clear with me that she would not want to be on life   support under any circumstance, we were able to complete an advanced   directive today.  She does not want her  to be her power of  as   he seems to have some memory problems.  She wishes her brother, Arsh Hsu, to be her power of  for healthcare in completing out the   advanced directive, she initialed 2, 3, and 5.  I did explain that those   choices to her in detail.  I also reviewed a  POLST form with her, but as she   will be in the hospital for a while, I feel it would be beneficial for her to   wait to fill that out.  She understands that the benefit of the POLST is when   she goes home.    Total time spent with advanced care planning was approximately 35 minutes.    PROBLEM LIST:  1.  Sacral fracture with possible bony metastasis.  2.  Severe right hip pain, much improved with use of steroids.  3.  History of left breast cancer, status post mastectomy, reconstruction   surgery and hormonal therapy.  4.  History of hypertension.  5.  History of diabetes.  6.  History of chronic pain.  7.  Hyperlipidemia.  8.  Opioid-induced constipation.  9.  Advanced care planning and goals of care.    DISCUSSION:  At this point, I think the patient should be continued on her   dexamethasone as written IV.  In addition, I would only use short-acting   opioids on her as she is very sensitive to the sedating effect of opioids and   they do not significantly help her pain.  Continue the gabapentin as is.  I   will also change her code status to DNR/DNI.  I did let the bedside nurse know   that the patient's IV is bleeding on her left antecubital area and also that   she wants to be a DNR/DNI and that that will need to be reversed during   surgery.  I did communicate all of this with Dr. Tao Allen who agrees with   the plan of care as stated.  We will also start MiraLax for her constipation.    Her POLST can be completed prior to discharge.  Palliative care will continue   to follow the patient even after surgery and assist with pain management.       ____________________________________     MD EVANS Norton / JUAN    DD:  12/17/2018 10:08:29  DT:  12/17/2018 11:04:14    D#:  [id number]  Job#:  [id number]    cc: TAO ALLEN MD

## 2018-12-17 NOTE — PROGRESS NOTES
"Pt upright in bed watching TV, calm, cooperative, generally pleasant affect. RN reviewed POC which is for surgery later this a.m. (pre-op checklist completed). RN EDU done on \"CDB w/ \"I.S.\" will reinforce t/o day. Fall and safety precautions in place, pt uses call light appropriately. Pt is independent w/ turns for skin integrity. Pt has SCD's on for VTE., hourly rounds ongoing, call light w/in reach.   "

## 2018-12-17 NOTE — PROGRESS NOTES
[facility] Medicine Daily Progress Note    Date of Service  12/17/2018    Chief Complaint  68 y.o. female admitted 12/14/2018 with right sacral fracture    Interval Problem Update  12/15: CT shows sacral fracture likely pathological.  Bone scan pending.  12/16: Patient having polyuria.  Bladder scan negative.  Ordered bone scan with anesthesia.  Patient continuing to have pelvic pain, consulted palliative for further pain management.  12/17: Seen by palliative.  Changed to DNR code status.  Bone biopsy pending by ortho.    Disposition  Pending workup of sacral fracture  Patient previously followed with Dr. Casillas in [facility]    Review of Systems  Review of Systems   Constitutional: Positive for fever and malaise/fatigue. Negative for chills.   Respiratory: Negative for cough and shortness of breath.    Cardiovascular: Negative for chest pain.   Gastrointestinal: Negative for nausea and vomiting.   Genitourinary: Positive for frequency. Negative for dysuria.   Musculoskeletal: Positive for joint pain.   Neurological: Negative for headaches.      Physical Exam  Temp:  [36.3 °C (97.3 °F)-38.3 °C (100.9 °F)] 36.5 °C (97.7 °F)  Pulse:  [] 60  Resp:  [15-18] 16  BP: (132-163)/() 163/85    Physical Exam   Constitutional: She appears well-developed.   HENT:   Head: Normocephalic.   Eyes: Conjunctivae are normal.   Cardiovascular: Tachycardia present.  Exam reveals no gallop.    Pulmonary/Chest: No respiratory distress. She has no wheezes.   Abdominal: She exhibits no distension. There is no tenderness.   Musculoskeletal: She exhibits tenderness.   Neurological: She is alert.   Skin: Skin is dry.       Fluids    Intake/Output Summary (Last 24 hours) at 12/17/18 1153  Last data filed at 12/17/18 0800   Gross per 24 hour   Intake              480 ml   Output                0 ml   Net              480 ml       Laboratory  Recent Labs      12/15/18   0318  12/16/18   0551  12/17/18   0419   WBC  5.7  6.1  3.5*    RBC  3.91*  4.16*  4.20   HEMOGLOBIN  9.7*  10.2*  10.4*   HEMATOCRIT  31.4*  33.7*  33.8*   MCV  80.3*  81.0*  80.5*   MCH  24.8*  24.5*  24.8*   MCHC  30.9*  30.3*  30.8*   RDW  47.9  48.2  45.3   PLATELETCT  287  272  272   MPV  9.7  9.6  10.0     Recent Labs      12/15/18   0318  12/16/18   0551  12/17/18   0419   SODIUM  130*  134*  131*   POTASSIUM  4.2  4.2  4.2   CHLORIDE  96  96  94*   CO2  29  30  29   GLUCOSE  114*  91  175*   BUN  13  12  18   CREATININE  0.72  0.65  0.72   CALCIUM  9.2  9.0  9.6     Recent Labs      12/14/18   1528   APTT  36.4*   INR  1.05         Recent Labs      12/15/18   0318   TRIGLYCERIDE  111   HDL  29*   LDL  53       Imaging  CT-PELVIS W/O PLUS RECONS   Final Result      1.  Right sacral fracture highly likely to be pathologic secondary to underlying malignancy      2.  Also, possible abnormal bone within the right ilium which could indicate malignancy.      3.  Osteoarthritis of both hip joint, both sacroiliac joints, lumbar spine facet joint.      4.  Prior hysterectomy      5.  Abdominal aortic atherosclerotic plaque      6.  Colonic diverticulosis      OUTSIDE IMAGES-US VASCULAR   Final Result      CT-FOREIGN FILM CAT SCAN   Final Result      OUTSIDE IMAGES-DX PELVIS   Final Result      NM-BONE SCAN WHOLE BODY    (Results Pending)   DX-PELVIS-TRAUMA SERIES  3-    (Results Pending)   DX-PORTABLE FLUORO > 1 HOUR    (Results Pending)        Assessment/Plan  Elevated alkaline phosphatase level- (present on admission)   Assessment & Plan    -Possibly due to bone metastases     RLS (restless legs syndrome)- (present on admission)   Assessment & Plan    -Chronic     GERD (gastroesophageal reflux disease)- (present on admission)   Assessment & Plan    -Continue omeprazole     Diabetes mellitus type 2 in obese (HCC)- (present on admission)   Assessment & Plan    -Continue metformin  -Continue sliding scale insulin     Chronic pain- (present on admission)   Assessment & Plan     -Continue gabapentin and tizanidine  -Norco as needed     History of breast cancer- (present on admission)   Assessment & Plan    -Previously followed with oncologist Dr. Casillas in [facility], last saw them 1-2 years ago     COPD (chronic obstructive pulmonary disease) (HCC)- (present on admission)   Assessment & Plan    -Not in exacerbation     UTI (urinary tract infection)- (present on admission)   Assessment & Plan    -Having symptoms of polyuria  -Started on ceftriaxone  -Urine culture pending     Sacral fracture, closed (HCC)- (present on admission)   Assessment & Plan    -Confirmed on CT scan  -Orthopedic surgeon Dr. Porter consulted  -Likely pathological from breast cancer metastases  -Ortho bone biopsy pending  -Bone scan pending  -Palliative consulted for pain control          VTE prophylaxis: enoxaparin

## 2018-12-18 ENCOUNTER — APPOINTMENT (OUTPATIENT)
Dept: RADIOLOGY | Facility: MEDICAL CENTER | Age: 68
DRG: 516 | End: 2018-12-18
Attending: HOSPITALIST
Payer: MEDICARE

## 2018-12-18 LAB
ALBUMIN SERPL BCP-MCNC: 3.3 G/DL (ref 3.2–4.9)
BUN SERPL-MCNC: 26 MG/DL (ref 8–22)
CALCIUM SERPL-MCNC: 9.5 MG/DL (ref 8.5–10.5)
CHLORIDE SERPL-SCNC: 94 MMOL/L (ref 96–112)
CO2 SERPL-SCNC: 33 MMOL/L (ref 20–33)
CREAT SERPL-MCNC: 0.8 MG/DL (ref 0.5–1.4)
ERYTHROCYTE [DISTWIDTH] IN BLOOD BY AUTOMATED COUNT: 45.4 FL (ref 35.9–50)
GLUCOSE BLD-MCNC: 103 MG/DL (ref 65–99)
GLUCOSE BLD-MCNC: 113 MG/DL (ref 65–99)
GLUCOSE BLD-MCNC: 117 MG/DL (ref 65–99)
GLUCOSE BLD-MCNC: 151 MG/DL (ref 65–99)
GLUCOSE SERPL-MCNC: 131 MG/DL (ref 65–99)
HCT VFR BLD AUTO: 34.6 % (ref 37–47)
HGB BLD-MCNC: 10.5 G/DL (ref 12–16)
MCH RBC QN AUTO: 24.2 PG (ref 27–33)
MCHC RBC AUTO-ENTMCNC: 30.3 G/DL (ref 33.6–35)
MCV RBC AUTO: 79.9 FL (ref 81.4–97.8)
PHOSPHATE SERPL-MCNC: 4.3 MG/DL (ref 2.5–4.5)
PLATELET # BLD AUTO: 315 K/UL (ref 164–446)
PMV BLD AUTO: 9.5 FL (ref 9–12.9)
POTASSIUM SERPL-SCNC: 4.1 MMOL/L (ref 3.6–5.5)
RBC # BLD AUTO: 4.33 M/UL (ref 4.2–5.4)
SODIUM SERPL-SCNC: 133 MMOL/L (ref 135–145)
WBC # BLD AUTO: 10.3 K/UL (ref 4.8–10.8)

## 2018-12-18 PROCEDURE — 80069 RENAL FUNCTION PANEL: CPT

## 2018-12-18 PROCEDURE — A9270 NON-COVERED ITEM OR SERVICE: HCPCS | Performed by: ORTHOPAEDIC SURGERY

## 2018-12-18 PROCEDURE — 99232 SBSQ HOSP IP/OBS MODERATE 35: CPT | Performed by: HOSPITALIST

## 2018-12-18 PROCEDURE — 99232 SBSQ HOSP IP/OBS MODERATE 35: CPT | Performed by: INTERNAL MEDICINE

## 2018-12-18 PROCEDURE — 85027 COMPLETE CBC AUTOMATED: CPT

## 2018-12-18 PROCEDURE — 82962 GLUCOSE BLOOD TEST: CPT | Mod: 91

## 2018-12-18 PROCEDURE — G8987 SELF CARE CURRENT STATUS: HCPCS | Mod: CK

## 2018-12-18 PROCEDURE — 97162 PT EVAL MOD COMPLEX 30 MIN: CPT

## 2018-12-18 PROCEDURE — A9270 NON-COVERED ITEM OR SERVICE: HCPCS | Performed by: HOSPITALIST

## 2018-12-18 PROCEDURE — 700111 HCHG RX REV CODE 636 W/ 250 OVERRIDE (IP): Performed by: ORTHOPAEDIC SURGERY

## 2018-12-18 PROCEDURE — 700102 HCHG RX REV CODE 250 W/ 637 OVERRIDE(OP): Performed by: INTERNAL MEDICINE

## 2018-12-18 PROCEDURE — G8988 SELF CARE GOAL STATUS: HCPCS | Mod: CI

## 2018-12-18 PROCEDURE — 97166 OT EVAL MOD COMPLEX 45 MIN: CPT

## 2018-12-18 PROCEDURE — G8978 MOBILITY CURRENT STATUS: HCPCS | Mod: CJ

## 2018-12-18 PROCEDURE — 700102 HCHG RX REV CODE 250 W/ 637 OVERRIDE(OP): Performed by: HOSPITALIST

## 2018-12-18 PROCEDURE — 700111 HCHG RX REV CODE 636 W/ 250 OVERRIDE (IP): Performed by: HOSPITALIST

## 2018-12-18 PROCEDURE — 700102 HCHG RX REV CODE 250 W/ 637 OVERRIDE(OP): Performed by: ORTHOPAEDIC SURGERY

## 2018-12-18 PROCEDURE — G8979 MOBILITY GOAL STATUS: HCPCS | Mod: CI

## 2018-12-18 PROCEDURE — 36415 COLL VENOUS BLD VENIPUNCTURE: CPT

## 2018-12-18 PROCEDURE — 770006 HCHG ROOM/CARE - MED/SURG/GYN SEMI*

## 2018-12-18 PROCEDURE — A9270 NON-COVERED ITEM OR SERVICE: HCPCS | Performed by: INTERNAL MEDICINE

## 2018-12-18 PROCEDURE — 700112 HCHG RX REV CODE 229: Performed by: ORTHOPAEDIC SURGERY

## 2018-12-18 RX ADMIN — KETOROLAC TROMETHAMINE 15 MG: 30 INJECTION, SOLUTION INTRAMUSCULAR at 17:02

## 2018-12-18 RX ADMIN — ACETAMINOPHEN 650 MG: 325 TABLET, FILM COATED ORAL at 05:38

## 2018-12-18 RX ADMIN — GABAPENTIN 300 MG: 300 CAPSULE ORAL at 18:59

## 2018-12-18 RX ADMIN — LOSARTAN POTASSIUM 50 MG: 50 TABLET ORAL at 05:38

## 2018-12-18 RX ADMIN — KETOROLAC TROMETHAMINE 15 MG: 30 INJECTION, SOLUTION INTRAMUSCULAR at 11:07

## 2018-12-18 RX ADMIN — INSULIN HUMAN 2 UNITS: 100 INJECTION, SOLUTION PARENTERAL at 11:13

## 2018-12-18 RX ADMIN — OXYCODONE HYDROCHLORIDE 10 MG: 10 TABLET ORAL at 14:21

## 2018-12-18 RX ADMIN — TIZANIDINE 2 MG: 4 TABLET ORAL at 18:59

## 2018-12-18 RX ADMIN — OXYCODONE HYDROCHLORIDE 10 MG: 10 TABLET ORAL at 02:36

## 2018-12-18 RX ADMIN — DOCUSATE SODIUM 100 MG: 100 CAPSULE, LIQUID FILLED ORAL at 18:59

## 2018-12-18 RX ADMIN — OMEPRAZOLE 20 MG: 20 CAPSULE, DELAYED RELEASE ORAL at 05:37

## 2018-12-18 RX ADMIN — OXYCODONE HYDROCHLORIDE 10 MG: 10 TABLET ORAL at 19:00

## 2018-12-18 RX ADMIN — ACETAMINOPHEN 650 MG: 325 TABLET, FILM COATED ORAL at 11:07

## 2018-12-18 RX ADMIN — FUROSEMIDE 40 MG: 40 TABLET ORAL at 18:59

## 2018-12-18 RX ADMIN — GABAPENTIN 300 MG: 300 CAPSULE ORAL at 05:38

## 2018-12-18 RX ADMIN — KETOROLAC TROMETHAMINE 15 MG: 30 INJECTION, SOLUTION INTRAMUSCULAR at 05:38

## 2018-12-18 RX ADMIN — CEFAZOLIN SODIUM 1 G: 1 INJECTION, SOLUTION INTRAVENOUS at 05:39

## 2018-12-18 RX ADMIN — DOCUSATE SODIUM 100 MG: 100 CAPSULE, LIQUID FILLED ORAL at 05:38

## 2018-12-18 RX ADMIN — DEXAMETHASONE SODIUM PHOSPHATE 4 MG: 4 INJECTION, SOLUTION INTRA-ARTICULAR; INTRALESIONAL; INTRAMUSCULAR; INTRAVENOUS; SOFT TISSUE at 05:38

## 2018-12-18 RX ADMIN — PRAVASTATIN SODIUM 40 MG: 10 TABLET ORAL at 20:20

## 2018-12-18 RX ADMIN — POLYETHYLENE GLYCOL 3350 1 PACKET: 17 POWDER, FOR SOLUTION ORAL at 05:38

## 2018-12-18 RX ADMIN — ACETAMINOPHEN 650 MG: 325 TABLET, FILM COATED ORAL at 18:59

## 2018-12-18 RX ADMIN — STANDARDIZED SENNA CONCENTRATE AND DOCUSATE SODIUM 1 TABLET: 8.6; 5 TABLET, FILM COATED ORAL at 20:20

## 2018-12-18 RX ADMIN — TIZANIDINE 2 MG: 4 TABLET ORAL at 05:38

## 2018-12-18 RX ADMIN — FUROSEMIDE 40 MG: 40 TABLET ORAL at 05:38

## 2018-12-18 ASSESSMENT — COGNITIVE AND FUNCTIONAL STATUS - GENERAL
DAILY ACTIVITIY SCORE: 16
SUGGESTED CMS G CODE MODIFIER DAILY ACTIVITY: CK
TURNING FROM BACK TO SIDE WHILE IN FLAT BAD: A LITTLE
TOILETING: A LOT
HELP NEEDED FOR BATHING: A LOT
MOBILITY SCORE: 15
DRESSING REGULAR UPPER BODY CLOTHING: A LITTLE
MOVING FROM LYING ON BACK TO SITTING ON SIDE OF FLAT BED: A LITTLE
PERSONAL GROOMING: A LITTLE
MOVING TO AND FROM BED TO CHAIR: UNABLE
SUGGESTED CMS G CODE MODIFIER MOBILITY: CK
STANDING UP FROM CHAIR USING ARMS: A LITTLE
WALKING IN HOSPITAL ROOM: A LITTLE
CLIMB 3 TO 5 STEPS WITH RAILING: A LOT
DRESSING REGULAR LOWER BODY CLOTHING: A LOT

## 2018-12-18 ASSESSMENT — GAIT ASSESSMENTS
GAIT LEVEL OF ASSIST: CONTACT GUARD ASSIST
DISTANCE (FEET): 5
DEVIATION: ANTALGIC;STEP TO;DECREASED BASE OF SUPPORT
ASSISTIVE DEVICE: FRONT WHEEL WALKER

## 2018-12-18 ASSESSMENT — PAIN SCALES - GENERAL
PAINLEVEL_OUTOF10: 0
PAINLEVEL_OUTOF10: 9
PAINLEVEL_OUTOF10: 5
PAINLEVEL_OUTOF10: 8
PAINLEVEL_OUTOF10: ASSUMED PAIN PRESENT
PAINLEVEL_OUTOF10: 7
PAINLEVEL_OUTOF10: 6
PAINLEVEL_OUTOF10: ASSUMED PAIN PRESENT

## 2018-12-18 ASSESSMENT — ENCOUNTER SYMPTOMS
DIAPHORESIS: 0
ABDOMINAL PAIN: 0
VOMITING: 0
WHEEZING: 0
TREMORS: 0
SPEECH CHANGE: 0
SENSORY CHANGE: 0
WEAKNESS: 1
STRIDOR: 0
SHORTNESS OF BREATH: 0
FALLS: 1
COUGH: 0
CHILLS: 0
HALLUCINATIONS: 0
MYALGIAS: 1
FLANK PAIN: 0
FEVER: 0
DIARRHEA: 0

## 2018-12-18 ASSESSMENT — ACTIVITIES OF DAILY LIVING (ADL): TOILETING: INDEPENDENT

## 2018-12-18 ASSESSMENT — LIFESTYLE VARIABLES: SUBSTANCE_ABUSE: 0

## 2018-12-18 NOTE — PROGRESS NOTES
[facility] Medicine Daily Progress Note    Date of Service  12/18/2018    Chief Complaint  68 y.o. Female hx of DM II, Htn,  COPD, DVT completed Xarelto , OA, Breast Ca  10 years ago, left mastectomy and reconstruction and antihormonal therapy admitted 12/14/2018 with right hip and pelvic pain after fall.     Hospital Course      Post Transsacral screw fixation, right sacral fracture, S1 and S2 on 12-17.  Concern for pathological fx. Remains TTWB to right lower ext .     Interval Problem Update  Right hip pain has improved .  Thinks she can lay flat for planned Bone scan.  Concern for pathologic fx- Bx is pending.     Consultants/Specialty  Dr. Porter, Orthopedics.     Code Status    DNR    Disposition    TBD, plan bone scan.     Review of Systems  Review of Systems   Constitutional: Negative for chills, diaphoresis, fever and malaise/fatigue.   HENT: Negative for congestion.    Respiratory: Negative for cough, shortness of breath, wheezing and stridor.    Cardiovascular: Negative for chest pain and leg swelling.   Gastrointestinal: Negative for abdominal pain, diarrhea and vomiting.   Genitourinary: Negative for flank pain and hematuria.   Musculoskeletal: Positive for falls, joint pain and myalgias.   Neurological: Positive for weakness. Negative for tremors, sensory change and speech change.   Psychiatric/Behavioral: Negative for hallucinations and substance abuse.        Physical Exam  Temp:  [35.9 °C (96.7 °F)-36.8 °C (98.3 °F)] 36.8 °C (98.3 °F)  Pulse:  [59-92] 63  Resp:  [12-17] 14  BP: (108-163)/(53-85) 117/64    Physical Exam   Constitutional: She is oriented to person, place, and time. No distress.   HENT:   Head: Normocephalic and atraumatic.   Eyes: EOM are normal. Right eye exhibits no discharge. Left eye exhibits no discharge. No scleral icterus.   Neck: Neck supple. No JVD present.   Cardiovascular: Normal rate and regular rhythm.    No murmur heard.  Pulmonary/Chest: Effort normal. No stridor. She  has no wheezes. She has no rales.   Abdominal: Soft. Bowel sounds are normal. She exhibits no distension. There is no tenderness.   Musculoskeletal: She exhibits tenderness (mild right lateral hip w incision dressing  dry, clean. ). She exhibits no edema.   Neurological: She is alert and oriented to person, place, and time. No cranial nerve deficit.   Skin: Skin is warm and dry. She is not diaphoretic. No pallor.   Psychiatric: She has a normal mood and affect. Her behavior is normal.   Vitals reviewed.      Fluids    Intake/Output Summary (Last 24 hours) at 12/18/18 0856  Last data filed at 12/18/18 0800   Gross per 24 hour   Intake              840 ml   Output                0 ml   Net              840 ml       Laboratory  Recent Labs      12/16/18   0551  12/17/18 0419 12/18/18   0400   WBC  6.1  3.5*  10.3   RBC  4.16*  4.20  4.33   HEMOGLOBIN  10.2*  10.4*  10.5*   HEMATOCRIT  33.7*  33.8*  34.6*   MCV  81.0*  80.5*  79.9*   MCH  24.5*  24.8*  24.2*   MCHC  30.3*  30.8*  30.3*   RDW  48.2  45.3  45.4   PLATELETCT  272  272  315   MPV  9.6  10.0  9.5     Recent Labs      12/16/18   0551  12/17/18 0419 12/18/18   0400   SODIUM  134*  131*  133*   POTASSIUM  4.2  4.2  4.1   CHLORIDE  96  94*  94*   CO2  30  29  33   GLUCOSE  91  175*  131*   BUN  12  18  26*   CREATININE  0.65  0.72  0.80   CALCIUM  9.0  9.6  9.5                   Imaging  DX-PELVIS-TRAUMA SERIES  3-   Final Result      Status post right sacral fracture fixation      DX-PORTABLE FLUOROSCOPY < 1 HOUR   Final Result         Portable fluoroscopy utilized for 1 minute 42 seconds.         DX-PELVIS-TRAUMA SERIES  3-   Final Result      Multiple fluoroscopic images were obtained intraoperatively during sacroiliac screw placement. Please see the patient's chart for full procedural details.      Fluoroscopy time 1 minute 42 seconds.   Fluoroscopy dose 47.8 mGy.      CT-PELVIS W/O PLUS RECONS   Final Result      1.  Right sacral fracture highly  likely to be pathologic secondary to underlying malignancy      2.  Also, possible abnormal bone within the right ilium which could indicate malignancy.      3.  Osteoarthritis of both hip joint, both sacroiliac joints, lumbar spine facet joint.      4.  Prior hysterectomy      5.  Abdominal aortic atherosclerotic plaque      6.  Colonic diverticulosis      OUTSIDE IMAGES-US VASCULAR   Final Result      CT-FOREIGN FILM CAT SCAN   Final Result      OUTSIDE IMAGES-DX PELVIS   Final Result      NM-BONE SCAN WHOLE BODY    (Results Pending)        Assessment/Plan  * Sacral fracture, closed (HCC)- (present on admission)   Assessment & Plan    Orthopedic surgeon Dr. Porter consulted- post Transsacral screw fixation, right sacral fracture, S1 and S2 on 12-17  Possible pathological from breast cancer metastases- fu bone biopsy   Bone scan- planned.  Discussed with patient no anesthesia planned w improved pain control.   Pain control -prn Iv Dilaudid , Oxycodone, toradol and Zaniflex - Palliative care input     Elevated alkaline phosphatase level- (present on admission)   Assessment & Plan    Possibly due to bone mets, plan workup as above.      RLS (restless legs syndrome)- (present on admission)   Assessment & Plan    -Chronic     Diabetes mellitus type 2 in obese ([facility])- (present on admission)   Assessment & Plan    Fair control   cw  Metformin, serial accu checks, sliding scale insulin as needed.      History of breast cancer- (present on admission)   Assessment & Plan    Previously followed with oncologist Dr. Casillas in [facility], last saw them 1-2 years ago.  Plan workup up of possible pathologic fx as above.      COPD (chronic obstructive pulmonary disease) ([facility])- (present on admission)   Assessment & Plan    No flare up.      UTI (urinary tract infection)- (present on admission)   Assessment & Plan    Had  symptoms of polyuria  Urine culture NGTD  Dc IV rocephin.      GERD (gastroesophageal reflux disease)-  (present on admission)   Assessment & Plan    cw prilosec     Chronic pain- (present on admission)   Assessment & Plan    cw  gabapentin and tizanidine  Norco as needed  Palliative care input.           VTE prophylaxis: Lovenox.     Discussed w  plan of care.

## 2018-12-18 NOTE — PROGRESS NOTES
Palliative Progress Note               Author: Lilian Lovegan Date & Time created: 2018  11:43 AM     Reason for subsequent visit: Pain management     Interval History:  Patient is status post trans sacral fracture fixation, POD #1.  She currently denies right hip pain, but complains of medial right ankle and lower right leg pain.  Patient is working with PT and is tolerating ambulation. Bone scan of right leg scheduled today. Discussed with patient that we will be discontinuing dexamethasone.      Review of Systems:  Negative for dyspnea. Positive for pain (Right ankle pain ). Negative for fatigue. Negative for anxiety. Negative for depression. Negative for insomnia. Negative for diarrhea. Negative for nausea and vomiting. Negative for constipation.        Physical Exam:  Recent vital signs  Temp (24hrs), Av.4 °C (97.6 °F), Min:35.9 °C (96.7 °F), Max:36.8 °C (98.3 °F)  Temperature: 36.8 °C (98.3 °F)  Pulse  Av.8  Min: 59  Max: 116Heart Rate (Monitored): 81  Blood Pressure : 117/64, NIBP: 131/61       Physical Exam   Constitutional: She is oriented to person, place, and time. She appears well-developed and well-nourished. No distress.   HENT:   Head: Normocephalic and atraumatic.   Eyes: Pupils are equal, round, and reactive to light. Conjunctivae and EOM are normal. Right eye exhibits no discharge. Left eye exhibits no discharge. No scleral icterus.   Neck: Normal range of motion.   Musculoskeletal: She exhibits tenderness (Medial right ankle and right lower leg). She exhibits no edema.   Neurological: She is alert and oriented to person, place, and time.   Skin: Skin is warm and dry. She is not diaphoretic. No erythema.   Psychiatric: She has a normal mood and affect. Her behavior is normal.     Recent Labs      18   0551  18   0419  18   0400   SODIUM  134*  131*  133*   POTASSIUM  4.2  4.2  4.1   CHLORIDE  96  94*  94*   CO2  30  29  33   GLUCOSE  91  175*  131*   BUN     26*   CREATININE  0.65  0.72  0.80   CALCIUM  9.0  9.6  9.5     Recent Labs      12/16/18   0551  12/17/18   0419  12/18/18   0400   WBC  6.1  3.5*  10.3   RBC  4.16*  4.20  4.33   HEMOGLOBIN  10.2*  10.4*  10.5*   HEMATOCRIT  33.7*  33.8*  34.6*   MCV  81.0*  80.5*  79.9*   MCH  24.5*  24.8*  24.2*   MCHC  30.3*  30.8*  30.3*   RDW  48.2  45.3  45.4   PLATELETCT  272  272  315   MPV  9.6  10.0  9.5     Problem List:  1. Sacral fracture with possible bony metastasis.  2. Severe right hip pain significantly improved with steroids.  3. History of left breast cancer status post mastectomy, reconstruction surgery and hormonal therapy.  4. History of HTN  5. History of diabetes  6. History of chronic pain  7. Hyperlipidemia  8. Opioid-induced constipation.    Assessment/Plan:  Right hip pain secondary to sacral fracture with possible bony metastasis.  Patient is status post trans sacral fracture fixation POD#1. Bone biopsy pending. Patient currently denies of right hip pain.  Patient is working with PT and tolerating ambulation. Bone scan is scheduled today.    Plan:  Discontinue dexamethasone as pain is significantly improved and well controlled. Patient is also on Ketorolac and both potent antiinflammatories used together may have significant side effects. Dexamethasone does not need to be tapered as she has not been on it for long.Recommend only using short-acting opioids as she is sensitive to sedating effect of opioids and has minimal efficacy on her pain.  Continue working with PT as tolerated       Code Status:DNAR/DNI    Advance Care Planning (ACP)/Goals of Care (GOC): Patient is unaware that there is a possibility of cancer and that a biopsy was taken during surgery.  Patient has filled out an AD and initialed 2, 3, and 5.  She wishes her brother, Arsh Hsu, to be her power of  for healthcare.     <PALLIATIVEREVIEW>    Total visit time was 25 minutes.  Greater than 50% of today's visit was spent  counseling and coordinating the patient's care regarding pain control.   Please refer to interval history assessment/plan for details.

## 2018-12-18 NOTE — PROGRESS NOTES
Orthopaedic Progress Note    Interval changes:  Patient doing well post op- hip pain decreased  DVNI  Bx pending     ROS - Patient denies any new issues.  Pain well controlled.    Blood pressure 117/64, pulse 63, temperature 36.8 °C (98.3 °F), temperature source Temporal, resp. rate 14, height 1.524 m (5'), weight 77.8 kg (171 lb 8.3 oz), SpO2 95 %, not currently breastfeeding.      Patient seen and examined  No acute distress  Breathing non labored  RRR  SI screw surgical dressings are clean, dry, and intact. Patient clearly fires tibialis anterior, EHL, and gastrocnemius/soleus. Sensation is intact to light touch throughout superficial peroneal, deep peroneal, tibial, saphenous, and sural nerve distributions. Strong and palpable 2+ dorsalis pedis and posterior tibial pulses with capillary refill less than 2 seconds. No lower leg tenderness or discomfort.       Recent Labs      12/16/18   0551  12/17/18   0419  12/18/18   0400   WBC  6.1  3.5*  10.3   RBC  4.16*  4.20  4.33   HEMOGLOBIN  10.2*  10.4*  10.5*   HEMATOCRIT  33.7*  33.8*  34.6*   MCV  81.0*  80.5*  79.9*   MCH  24.5*  24.8*  24.2*   MCHC  30.3*  30.8*  30.3*   RDW  48.2  45.3  45.4   PLATELETCT  272  272  315   MPV  9.6  10.0  9.5       Active Hospital Problems    Diagnosis   • Sacral fracture, closed ([facility]) [S32.10XA]     Priority: High   • Elevated alkaline phosphatase level [R74.8]     Priority: Medium   • UTI (urinary tract infection) [N39.0]     Priority: Medium   • COPD (chronic obstructive pulmonary disease) ([facility]) [J44.9]     Priority: Medium   • History of breast cancer [Z85.3]     Priority: Medium   • Diabetes mellitus type 2 in obese ([facility]) [E11.69, E66.9]     Priority: Medium   • RLS (restless legs syndrome) [G25.81]     Priority: Medium   • Chronic pain [G89.29]     Priority: Low   • GERD (gastroesophageal reflux disease) [K21.9]     Priority: Low       Assessment/Plan:  Biopsy pending   POD#1 S/P Transsacral screw fixation, right sacral  fracture, S1 and S2.  Wt bearing status - TTWB RLE  Wound care/Drains - dressings changed every other day by nursing starting POD#2  Future Procedures - none planned  Lovenox: Start 12/18, Duration-until ambulatory > 150'  Sutures/Staples out- 10-14 days post operatively  PT/OT-initiated  Antibiotics: completed  DVT Prophylaxis- TEDS/SCDs/Foot pumps  Sue-none  Case Coordination for Discharge Planning - Disposition pending therapy recs

## 2018-12-18 NOTE — PROGRESS NOTES
Pt has returned to floor, post op assessments done, post op VS started. Pt is tolerating fluids. No n/v reported. Pt is on 10L mask per protocol. SCDs on. Fall and safety precautions in place, pt uses call light appropriately. ABX scheduled per MAR, Lovenox on MAR for later time. Hourly rounds ongoing, call light w/in reach.

## 2018-12-19 ENCOUNTER — APPOINTMENT (OUTPATIENT)
Dept: RADIOLOGY | Facility: MEDICAL CENTER | Age: 68
DRG: 516 | End: 2018-12-19
Attending: HOSPITALIST
Payer: MEDICARE

## 2018-12-19 PROBLEM — D50.9 MICROCYTIC ANEMIA: Status: ACTIVE | Noted: 2018-12-19

## 2018-12-19 LAB
ALBUMIN SERPL BCP-MCNC: 3.3 G/DL (ref 3.2–4.9)
BUN SERPL-MCNC: 35 MG/DL (ref 8–22)
CALCIUM SERPL-MCNC: 9.2 MG/DL (ref 8.5–10.5)
CHLORIDE SERPL-SCNC: 94 MMOL/L (ref 96–112)
CO2 SERPL-SCNC: 29 MMOL/L (ref 20–33)
CREAT SERPL-MCNC: 0.83 MG/DL (ref 0.5–1.4)
ERYTHROCYTE [DISTWIDTH] IN BLOOD BY AUTOMATED COUNT: 47.9 FL (ref 35.9–50)
FERRITIN SERPL-MCNC: 19.7 NG/ML (ref 10–291)
GLUCOSE BLD-MCNC: 112 MG/DL (ref 65–99)
GLUCOSE BLD-MCNC: 122 MG/DL (ref 65–99)
GLUCOSE BLD-MCNC: 122 MG/DL (ref 65–99)
GLUCOSE BLD-MCNC: 67 MG/DL (ref 65–99)
GLUCOSE BLD-MCNC: 76 MG/DL (ref 65–99)
GLUCOSE BLD-MCNC: 83 MG/DL (ref 65–99)
GLUCOSE BLD-MCNC: 89 MG/DL (ref 65–99)
GLUCOSE SERPL-MCNC: 145 MG/DL (ref 65–99)
HCT VFR BLD AUTO: 36.6 % (ref 37–47)
HGB BLD-MCNC: 11.3 G/DL (ref 12–16)
IRON SATN MFR SERPL: 3 % (ref 15–55)
IRON SERPL-MCNC: 17 UG/DL (ref 40–170)
MCH RBC QN AUTO: 25 PG (ref 27–33)
MCHC RBC AUTO-ENTMCNC: 30.9 G/DL (ref 33.6–35)
MCV RBC AUTO: 81 FL (ref 81.4–97.8)
PHOSPHATE SERPL-MCNC: 3.4 MG/DL (ref 2.5–4.5)
PLATELET # BLD AUTO: 344 K/UL (ref 164–446)
PMV BLD AUTO: 10.1 FL (ref 9–12.9)
POTASSIUM SERPL-SCNC: 4.1 MMOL/L (ref 3.6–5.5)
RBC # BLD AUTO: 4.52 M/UL (ref 4.2–5.4)
SODIUM SERPL-SCNC: 133 MMOL/L (ref 135–145)
TIBC SERPL-MCNC: 487 UG/DL (ref 250–450)
TRANSFERRIN SERPL-MCNC: 349 MG/DL (ref 200–370)
WBC # BLD AUTO: 6.5 K/UL (ref 4.8–10.8)

## 2018-12-19 PROCEDURE — 700102 HCHG RX REV CODE 250 W/ 637 OVERRIDE(OP): Performed by: ORTHOPAEDIC SURGERY

## 2018-12-19 PROCEDURE — 84466 ASSAY OF TRANSFERRIN: CPT

## 2018-12-19 PROCEDURE — 700102 HCHG RX REV CODE 250 W/ 637 OVERRIDE(OP): Performed by: INTERNAL MEDICINE

## 2018-12-19 PROCEDURE — A9270 NON-COVERED ITEM OR SERVICE: HCPCS | Performed by: ORTHOPAEDIC SURGERY

## 2018-12-19 PROCEDURE — A9270 NON-COVERED ITEM OR SERVICE: HCPCS | Performed by: INTERNAL MEDICINE

## 2018-12-19 PROCEDURE — 83550 IRON BINDING TEST: CPT

## 2018-12-19 PROCEDURE — 80069 RENAL FUNCTION PANEL: CPT

## 2018-12-19 PROCEDURE — 99232 SBSQ HOSP IP/OBS MODERATE 35: CPT | Performed by: HOSPITALIST

## 2018-12-19 PROCEDURE — 700102 HCHG RX REV CODE 250 W/ 637 OVERRIDE(OP): Performed by: HOSPITALIST

## 2018-12-19 PROCEDURE — 85027 COMPLETE CBC AUTOMATED: CPT

## 2018-12-19 PROCEDURE — 700112 HCHG RX REV CODE 229: Performed by: ORTHOPAEDIC SURGERY

## 2018-12-19 PROCEDURE — 36415 COLL VENOUS BLD VENIPUNCTURE: CPT

## 2018-12-19 PROCEDURE — 700111 HCHG RX REV CODE 636 W/ 250 OVERRIDE (IP): Performed by: ORTHOPAEDIC SURGERY

## 2018-12-19 PROCEDURE — A9270 NON-COVERED ITEM OR SERVICE: HCPCS | Performed by: HOSPITALIST

## 2018-12-19 PROCEDURE — 770006 HCHG ROOM/CARE - MED/SURG/GYN SEMI*

## 2018-12-19 PROCEDURE — 82728 ASSAY OF FERRITIN: CPT

## 2018-12-19 PROCEDURE — A9503 TC99M MEDRONATE: HCPCS

## 2018-12-19 PROCEDURE — 82962 GLUCOSE BLOOD TEST: CPT | Mod: 91

## 2018-12-19 PROCEDURE — 83540 ASSAY OF IRON: CPT

## 2018-12-19 RX ADMIN — OXYCODONE HYDROCHLORIDE 10 MG: 10 TABLET ORAL at 07:27

## 2018-12-19 RX ADMIN — TIZANIDINE 2 MG: 4 TABLET ORAL at 04:38

## 2018-12-19 RX ADMIN — KETOROLAC TROMETHAMINE 15 MG: 30 INJECTION, SOLUTION INTRAMUSCULAR at 17:04

## 2018-12-19 RX ADMIN — OXYCODONE HYDROCHLORIDE 10 MG: 10 TABLET ORAL at 04:12

## 2018-12-19 RX ADMIN — KETOROLAC TROMETHAMINE 15 MG: 30 INJECTION, SOLUTION INTRAMUSCULAR at 00:06

## 2018-12-19 RX ADMIN — ACETAMINOPHEN 650 MG: 325 TABLET, FILM COATED ORAL at 04:38

## 2018-12-19 RX ADMIN — DOCUSATE SODIUM 100 MG: 100 CAPSULE, LIQUID FILLED ORAL at 17:04

## 2018-12-19 RX ADMIN — GABAPENTIN 300 MG: 300 CAPSULE ORAL at 17:04

## 2018-12-19 RX ADMIN — PRAVASTATIN SODIUM 40 MG: 10 TABLET ORAL at 21:55

## 2018-12-19 RX ADMIN — ENOXAPARIN SODIUM 40 MG: 100 INJECTION SUBCUTANEOUS at 00:06

## 2018-12-19 RX ADMIN — ACETAMINOPHEN 650 MG: 325 TABLET, FILM COATED ORAL at 17:04

## 2018-12-19 RX ADMIN — ACETAMINOPHEN 650 MG: 325 TABLET, FILM COATED ORAL at 10:40

## 2018-12-19 RX ADMIN — TIZANIDINE 2 MG: 4 TABLET ORAL at 17:04

## 2018-12-19 RX ADMIN — POLYETHYLENE GLYCOL 3350 1 PACKET: 17 POWDER, FOR SOLUTION ORAL at 04:38

## 2018-12-19 RX ADMIN — DOCUSATE SODIUM 100 MG: 100 CAPSULE, LIQUID FILLED ORAL at 04:38

## 2018-12-19 RX ADMIN — FUROSEMIDE 40 MG: 40 TABLET ORAL at 17:05

## 2018-12-19 RX ADMIN — OXYCODONE HYDROCHLORIDE 10 MG: 10 TABLET ORAL at 00:06

## 2018-12-19 RX ADMIN — KETOROLAC TROMETHAMINE 15 MG: 30 INJECTION, SOLUTION INTRAMUSCULAR at 10:41

## 2018-12-19 RX ADMIN — OMEPRAZOLE 20 MG: 20 CAPSULE, DELAYED RELEASE ORAL at 04:38

## 2018-12-19 RX ADMIN — OXYCODONE HYDROCHLORIDE 10 MG: 10 TABLET ORAL at 15:09

## 2018-12-19 RX ADMIN — LOSARTAN POTASSIUM 50 MG: 50 TABLET ORAL at 04:38

## 2018-12-19 RX ADMIN — KETOROLAC TROMETHAMINE 15 MG: 30 INJECTION, SOLUTION INTRAMUSCULAR at 04:39

## 2018-12-19 RX ADMIN — ACETAMINOPHEN 650 MG: 325 TABLET, FILM COATED ORAL at 00:06

## 2018-12-19 RX ADMIN — GABAPENTIN 300 MG: 300 CAPSULE ORAL at 04:38

## 2018-12-19 RX ADMIN — FUROSEMIDE 40 MG: 40 TABLET ORAL at 04:38

## 2018-12-19 RX ADMIN — KETOROLAC TROMETHAMINE 15 MG: 30 INJECTION, SOLUTION INTRAMUSCULAR at 21:56

## 2018-12-19 ASSESSMENT — ENCOUNTER SYMPTOMS
MYALGIAS: 1
VOMITING: 0
SENSORY CHANGE: 0
STRIDOR: 0
FALLS: 1
DIARRHEA: 0
WHEEZING: 0
CHILLS: 0
WEAKNESS: 1
SPEECH CHANGE: 0
HALLUCINATIONS: 0
SHORTNESS OF BREATH: 0
FEVER: 0
ABDOMINAL PAIN: 0
COUGH: 0

## 2018-12-19 ASSESSMENT — PAIN SCALES - GENERAL
PAINLEVEL_OUTOF10: 8
PAINLEVEL_OUTOF10: 5
PAINLEVEL_OUTOF10: 5
PAINLEVEL_OUTOF10: 4
PAINLEVEL_OUTOF10: 8
PAINLEVEL_OUTOF10: 5
PAINLEVEL_OUTOF10: ASSUMED PAIN PRESENT
PAINLEVEL_OUTOF10: 6
PAINLEVEL_OUTOF10: 4
PAINLEVEL_OUTOF10: 4

## 2018-12-19 ASSESSMENT — LIFESTYLE VARIABLES: SUBSTANCE_ABUSE: 0

## 2018-12-19 NOTE — ASSESSMENT & PLAN NOTE
Likely multi factorial , chronic dz, recent procedural , iron deficient. No symptoms GI or  bleed. Hgb stable.   Start iron supplements  Fu outpt

## 2018-12-19 NOTE — PROGRESS NOTES
Awake, A&O, VSS, HERNANDEZ.  Right hip dressing is CDI.  Bruising present.  Medicated for pain per mar.  Using BSC with assist.  Skin intact with no opened areas noted.  POC discussed, pt agrees and verbalizes understanding.  Hourly rounding in place, call light is within reach.  Assessment completed as charted.

## 2018-12-19 NOTE — PROGRESS NOTES
Orthopaedic PA Progress Note    Interval changes:Doing well, comfortable, patient and  enquiring when they can go home    ROS - Patient denies any new issues. No chest pain, dyspnea, or fever.  Pain well controlled.    Blood pressure 109/68, pulse 94, temperature 36.3 °C (97.3 °F), temperature source Temporal, resp. rate 17, height 1.524 m (5'), weight 77.8 kg (171 lb 8.3 oz), SpO2 93 %, not currently breastfeeding.    Patient seen and examined  No acute distress  Breathing non labored  RRR  Surgical dressing is clean, dry, and intact. Patient clearly fires tibialis anterior, EHL, and gastrocnemius/soleus. Sensation is intact to light touch throughout superficial peroneal, deep peroneal, tibial, saphenous, and sural nerve distributions. Strong and palpable 2+ dorsalis pedis and posterior tibial pulses with capillary refill less than 2 seconds. No lower leg tenderness or discomfort.    Recent Labs      12/17/18   0419  12/18/18   0400  12/19/18   0538   WBC  3.5*  10.3  6.5   RBC  4.20  4.33  4.52   HEMOGLOBIN  10.4*  10.5*  11.3*   HEMATOCRIT  33.8*  34.6*  36.6*   MCV  80.5*  79.9*  81.0*   MCH  24.8*  24.2*  25.0*   MCHC  30.8*  30.3*  30.9*   RDW  45.3  45.4  47.9   PLATELETCT  272  315  344   MPV  10.0  9.5  10.1       Active Hospital Problems    Diagnosis   • Sacral fracture, closed ([facility]) [S32.10XA]     Priority: High   • Microcytic anemia [D50.9]     Priority: Medium   • Elevated alkaline phosphatase level [R74.8]     Priority: Medium   • UTI (urinary tract infection) [N39.0]     Priority: Medium   • COPD (chronic obstructive pulmonary disease) ([facility]) [J44.9]     Priority: Medium   • History of breast cancer [Z85.3]     Priority: Medium   • Diabetes mellitus type 2 in obese ([facility]) [E11.69, E66.9]     Priority: Medium   • RLS (restless legs syndrome) [G25.81]     Priority: Medium   • Chronic pain [G89.29]     Priority: Low   • GERD (gastroesophageal reflux disease) [K21.9]     Priority: Low      Assessment/Plan:  Bone scan this afternoon  POD#2 S/P Transsacral screw fixation, right sacral fracture, S1 and S2.  Wt bearing status - TTWB RLE  Wound care/Drains - dressings changed every other day by nursing starting POD#2  Future Procedures - none planned  Lovenox: Start 12/18, Duration-until ambulatory > 150'  Sutures/Staples out- 10-14 days post operatively  PT/OT-initiated  Antibiotics: completed  DVT Prophylaxis- TEDS/SCDs/Foot pumps  Sue-none  Case Coordination for Discharge Planning - Disposition pending therapy recs

## 2018-12-19 NOTE — DISCHARGE PLANNING
Anticipated Discharge Disposition: SNF     Action: Met with patient she states she was living with her son in [facility] until he was arrested and she moved back to [place] and has been staying at the shelter. She is interested in going to Skilled Rehab to get stronger and plans to find her own place to stay when discharged. Choice for blanket SNF and faxed to [facility]    Barriers to Discharge: medical clearance and pending SNF acceptance.     Plan: SNF

## 2018-12-19 NOTE — PROGRESS NOTES
[facility] Daily Progress Note    Date of Service  12/19/2018    Chief Complaint  68 y.o. Female hx of DM II, Htn,  COPD, DVT completed Xarelto , OA, Breast Ca  10 years ago, left mastectomy and reconstruction and antihormonal therapy admitted 12/14/2018 with right hip and pelvic pain after fall.     Hospital Course      Post Transsacral screw fixation, right sacral fracture, S1 and S2 on 12-17.  Concern for pathological fx. Remains TTWB to right lower ext .     Interval Problem Update    Path pending.  Bone scan was delayed--planned for today.  IV Toradol and oxycodone has provided some right leg pain relief.     Consultants/Specialty  Dr. Porter, Orthopedics.   Palliative care.     Code Status    DNR    Disposition    TBD, plan bone scan.  Possible SNF for rehab.     Review of Systems  Review of Systems   Constitutional: Negative for chills and fever.   HENT: Negative for congestion.    Respiratory: Negative for cough, shortness of breath, wheezing and stridor.    Cardiovascular: Negative for chest pain and leg swelling.   Gastrointestinal: Negative for abdominal pain, diarrhea and vomiting.   Musculoskeletal: Positive for falls, joint pain and myalgias.   Neurological: Positive for weakness. Negative for sensory change and speech change.   Psychiatric/Behavioral: Negative for hallucinations and substance abuse.        Physical Exam  Temp:  [36.6 °C (97.9 °F)-37 °C (98.6 °F)] 36.8 °C (98.2 °F)  Pulse:  [65-83] 67  Resp:  [15-16] 16  BP: (118-152)/(61-78) 152/61    Physical Exam   Constitutional: She is oriented to person, place, and time. No distress.   HENT:   Head: Normocephalic and atraumatic.   Eyes: EOM are normal. Right eye exhibits no discharge. Left eye exhibits no discharge. No scleral icterus.   Neck: Neck supple. No JVD present.   Cardiovascular: Normal rate and regular rhythm.    No murmur heard.  Pulmonary/Chest: Effort normal. No stridor. She has no wheezes. She has no rales.   Abdominal:  Soft. Bowel sounds are normal. She exhibits no distension. There is no tenderness.   Musculoskeletal: She exhibits tenderness (Mild TTP.  right lateral hip w incision dressing  dry, clean.  Left ankle surgical scar present. ). She exhibits no edema.   Neurological: She is alert and oriented to person, place, and time.   No gross focal weakness   Skin: Skin is warm and dry. She is not diaphoretic. No pallor.   Vitals reviewed.      Fluids    Intake/Output Summary (Last 24 hours) at 12/19/18 0806  Last data filed at 12/18/18 1700   Gross per 24 hour   Intake              240 ml   Output                0 ml   Net              240 ml       Laboratory  Recent Labs      12/17/18 0419 12/18/18   0400  12/19/18   0538   WBC  3.5*  10.3  6.5   RBC  4.20  4.33  4.52   HEMOGLOBIN  10.4*  10.5*  11.3*   HEMATOCRIT  33.8*  34.6*  36.6*   MCV  80.5*  79.9*  81.0*   MCH  24.8*  24.2*  25.0*   MCHC  30.8*  30.3*  30.9*   RDW  45.3  45.4  47.9   PLATELETCT  272  315  344   MPV  10.0  9.5  10.1     Recent Labs      12/17/18 0419 12/18/18   0400  12/19/18   0538   SODIUM  131*  133*  133*   POTASSIUM  4.2  4.1  4.1   CHLORIDE  94*  94*  94*   CO2  29  33  29   GLUCOSE  175*  131*  145*   BUN  18  26*  35*   CREATININE  0.72  0.80  0.83   CALCIUM  9.6  9.5  9.2                   Imaging  DX-PELVIS-TRAUMA SERIES  3-   Final Result      Status post right sacral fracture fixation      DX-PORTABLE FLUOROSCOPY < 1 HOUR   Final Result         Portable fluoroscopy utilized for 1 minute 42 seconds.         DX-PELVIS-TRAUMA SERIES  3-   Final Result      Multiple fluoroscopic images were obtained intraoperatively during sacroiliac screw placement. Please see the patient's chart for full procedural details.      Fluoroscopy time 1 minute 42 seconds.   Fluoroscopy dose 47.8 mGy.      CT-PELVIS W/O PLUS RECONS   Final Result      1.  Right sacral fracture highly likely to be pathologic secondary to underlying malignancy      2.  Also,  possible abnormal bone within the right ilium which could indicate malignancy.      3.  Osteoarthritis of both hip joint, both sacroiliac joints, lumbar spine facet joint.      4.  Prior hysterectomy      5.  Abdominal aortic atherosclerotic plaque      6.  Colonic diverticulosis      OUTSIDE IMAGES-US VASCULAR   Final Result      CT-FOREIGN FILM CAT SCAN   Final Result      OUTSIDE IMAGES-DX PELVIS   Final Result      NM-BONE SCAN WHOLE BODY    (Results Pending)        Assessment/Plan  * Sacral fracture, closed ([facility])- (present on admission)   Assessment & Plan    post Transsacral screw fixation, right sacral fracture, S1 and S2 on 12-17  Possible pathological from breast cancer metastases- Path pending.   Improved pain - Patient able to lay flat- plan Bone scan for signs of metastatic disease.  Pain control -continue prn Iv Dilaudid , Oxycodone, toradol and Zaniflex - Palliative care input  Ortho input.   Sw for discharge planning.      Microcytic anemia   Assessment & Plan    Likely multi factorial , chronic dz, recent procedural   Check iron studies.      Elevated alkaline phosphatase level- (present on admission)   Assessment & Plan    Possibly due to bone mets, plan workup as above.      RLS (restless legs syndrome)- (present on admission)   Assessment & Plan    -Chronic     Diabetes mellitus type 2 in obese ([facility])- (present on admission)   Assessment & Plan    Fair control   cw  Metformin, serial accu checks, sliding scale insulin as needed.      History of breast cancer- (present on admission)   Assessment & Plan    Previously followed with oncologist Dr. Casillas in [facility], last saw them 1-2 years ago.  Plan workup up of possible pathologic fx as above.      COPD (chronic obstructive pulmonary disease) ([facility])- (present on admission)   Assessment & Plan    No flare up.      UTI (urinary tract infection)- (present on admission)   Assessment & Plan    Had  symptoms of polyuria. Urine culture NGTD.   Antibiotics stopped.      GERD (gastroesophageal reflux disease)- (present on admission)   Assessment & Plan    cw prilosec     Chronic pain- (present on admission)   Assessment & Plan    cw  gabapentin and tizanidine  Norco as needed  Palliative care input.           VTE prophylaxis: Lovenox.     Discussed w  plan of care.

## 2018-12-20 LAB
ALBUMIN SERPL BCP-MCNC: 3.3 G/DL (ref 3.2–4.9)
ANION GAP SERPL CALC-SCNC: 6 MMOL/L (ref 0–11.9)
BUN SERPL-MCNC: 28 MG/DL (ref 8–22)
CALCIUM SERPL-MCNC: 9.4 MG/DL (ref 8.5–10.5)
CHLORIDE SERPL-SCNC: 96 MMOL/L (ref 96–112)
CO2 SERPL-SCNC: 31 MMOL/L (ref 20–33)
CREAT SERPL-MCNC: 0.82 MG/DL (ref 0.5–1.4)
ERYTHROCYTE [DISTWIDTH] IN BLOOD BY AUTOMATED COUNT: 48.3 FL (ref 35.9–50)
GGT SERPL-CCNC: 93 U/L (ref 7–34)
GLUCOSE BLD-MCNC: 105 MG/DL (ref 65–99)
GLUCOSE BLD-MCNC: 109 MG/DL (ref 65–99)
GLUCOSE BLD-MCNC: 142 MG/DL (ref 65–99)
GLUCOSE BLD-MCNC: 163 MG/DL (ref 65–99)
GLUCOSE BLD-MCNC: 88 MG/DL (ref 65–99)
GLUCOSE SERPL-MCNC: 126 MG/DL (ref 65–99)
HCT VFR BLD AUTO: 36.5 % (ref 37–47)
HGB BLD-MCNC: 11.3 G/DL (ref 12–16)
MCH RBC QN AUTO: 25.1 PG (ref 27–33)
MCHC RBC AUTO-ENTMCNC: 31 G/DL (ref 33.6–35)
MCV RBC AUTO: 81.1 FL (ref 81.4–97.8)
PHOSPHATE SERPL-MCNC: 3.5 MG/DL (ref 2.5–4.5)
PLATELET # BLD AUTO: 330 K/UL (ref 164–446)
PMV BLD AUTO: 10.1 FL (ref 9–12.9)
POTASSIUM SERPL-SCNC: 4.2 MMOL/L (ref 3.6–5.5)
RBC # BLD AUTO: 4.5 M/UL (ref 4.2–5.4)
SODIUM SERPL-SCNC: 133 MMOL/L (ref 135–145)
WBC # BLD AUTO: 5.4 K/UL (ref 4.8–10.8)

## 2018-12-20 PROCEDURE — 82977 ASSAY OF GGT: CPT

## 2018-12-20 PROCEDURE — 700102 HCHG RX REV CODE 250 W/ 637 OVERRIDE(OP): Performed by: ORTHOPAEDIC SURGERY

## 2018-12-20 PROCEDURE — 36415 COLL VENOUS BLD VENIPUNCTURE: CPT

## 2018-12-20 PROCEDURE — A9270 NON-COVERED ITEM OR SERVICE: HCPCS | Performed by: HOSPITALIST

## 2018-12-20 PROCEDURE — 770006 HCHG ROOM/CARE - MED/SURG/GYN SEMI*

## 2018-12-20 PROCEDURE — A9270 NON-COVERED ITEM OR SERVICE: HCPCS | Performed by: ORTHOPAEDIC SURGERY

## 2018-12-20 PROCEDURE — 80069 RENAL FUNCTION PANEL: CPT

## 2018-12-20 PROCEDURE — 85027 COMPLETE CBC AUTOMATED: CPT

## 2018-12-20 PROCEDURE — 99232 SBSQ HOSP IP/OBS MODERATE 35: CPT | Performed by: NURSE PRACTITIONER

## 2018-12-20 PROCEDURE — 700102 HCHG RX REV CODE 250 W/ 637 OVERRIDE(OP): Performed by: HOSPITALIST

## 2018-12-20 PROCEDURE — 99497 ADVNCD CARE PLAN 30 MIN: CPT | Performed by: NURSE PRACTITIONER

## 2018-12-20 PROCEDURE — 99232 SBSQ HOSP IP/OBS MODERATE 35: CPT | Performed by: HOSPITALIST

## 2018-12-20 PROCEDURE — 700112 HCHG RX REV CODE 229: Performed by: ORTHOPAEDIC SURGERY

## 2018-12-20 PROCEDURE — 82962 GLUCOSE BLOOD TEST: CPT | Mod: 91

## 2018-12-20 PROCEDURE — 700111 HCHG RX REV CODE 636 W/ 250 OVERRIDE (IP): Performed by: ORTHOPAEDIC SURGERY

## 2018-12-20 RX ORDER — FERROUS SULFATE 325(65) MG
325 TABLET ORAL
Status: DISCONTINUED | OUTPATIENT
Start: 2018-12-20 | End: 2018-12-26 | Stop reason: HOSPADM

## 2018-12-20 RX ADMIN — ACETAMINOPHEN 650 MG: 325 TABLET, FILM COATED ORAL at 17:53

## 2018-12-20 RX ADMIN — TIZANIDINE 2 MG: 4 TABLET ORAL at 05:14

## 2018-12-20 RX ADMIN — GABAPENTIN 300 MG: 300 CAPSULE ORAL at 17:50

## 2018-12-20 RX ADMIN — KETOROLAC TROMETHAMINE 15 MG: 30 INJECTION, SOLUTION INTRAMUSCULAR at 12:05

## 2018-12-20 RX ADMIN — ACETAMINOPHEN 650 MG: 325 TABLET, FILM COATED ORAL at 05:14

## 2018-12-20 RX ADMIN — DOCUSATE SODIUM 100 MG: 100 CAPSULE, LIQUID FILLED ORAL at 17:48

## 2018-12-20 RX ADMIN — DOCUSATE SODIUM 100 MG: 100 CAPSULE, LIQUID FILLED ORAL at 05:15

## 2018-12-20 RX ADMIN — OXYCODONE HYDROCHLORIDE 10 MG: 10 TABLET ORAL at 05:14

## 2018-12-20 RX ADMIN — KETOROLAC TROMETHAMINE 15 MG: 30 INJECTION, SOLUTION INTRAMUSCULAR at 05:14

## 2018-12-20 RX ADMIN — FERROUS SULFATE TAB 325 MG (65 MG ELEMENTAL FE) 325 MG: 325 (65 FE) TAB at 08:58

## 2018-12-20 RX ADMIN — ACETAMINOPHEN 650 MG: 325 TABLET, FILM COATED ORAL at 00:42

## 2018-12-20 RX ADMIN — FUROSEMIDE 40 MG: 40 TABLET ORAL at 05:15

## 2018-12-20 RX ADMIN — GABAPENTIN 300 MG: 300 CAPSULE ORAL at 05:15

## 2018-12-20 RX ADMIN — OXYCODONE HYDROCHLORIDE 10 MG: 10 TABLET ORAL at 17:48

## 2018-12-20 RX ADMIN — LOSARTAN POTASSIUM 50 MG: 50 TABLET ORAL at 05:15

## 2018-12-20 RX ADMIN — OXYCODONE HYDROCHLORIDE 10 MG: 10 TABLET ORAL at 23:26

## 2018-12-20 RX ADMIN — FUROSEMIDE 40 MG: 40 TABLET ORAL at 17:50

## 2018-12-20 RX ADMIN — OMEPRAZOLE 20 MG: 20 CAPSULE, DELAYED RELEASE ORAL at 05:15

## 2018-12-20 RX ADMIN — ACETAMINOPHEN 650 MG: 325 TABLET, FILM COATED ORAL at 23:26

## 2018-12-20 RX ADMIN — INSULIN HUMAN 2 UNITS: 100 INJECTION, SOLUTION PARENTERAL at 22:14

## 2018-12-20 RX ADMIN — ENOXAPARIN SODIUM 40 MG: 100 INJECTION SUBCUTANEOUS at 00:42

## 2018-12-20 RX ADMIN — ACETAMINOPHEN 650 MG: 325 TABLET, FILM COATED ORAL at 12:05

## 2018-12-20 RX ADMIN — TIZANIDINE 2 MG: 4 TABLET ORAL at 17:50

## 2018-12-20 RX ADMIN — PRAVASTATIN SODIUM 40 MG: 10 TABLET ORAL at 21:51

## 2018-12-20 ASSESSMENT — PAIN SCALES - GENERAL
PAINLEVEL_OUTOF10: 4
PAINLEVEL_OUTOF10: 1
PAINLEVEL_OUTOF10: 2
PAINLEVEL_OUTOF10: 2
PAINLEVEL_OUTOF10: 3
PAINLEVEL_OUTOF10: 6
PAINLEVEL_OUTOF10: 3
PAINLEVEL_OUTOF10: 7

## 2018-12-20 ASSESSMENT — ENCOUNTER SYMPTOMS
WEAKNESS: 1
MYALGIAS: 1
DIARRHEA: 0
CHILLS: 0
COUGH: 0
ABDOMINAL PAIN: 0
BLOOD IN STOOL: 0
VOMITING: 0
SHORTNESS OF BREATH: 0
SPEECH CHANGE: 0
SENSORY CHANGE: 0
PALPITATIONS: 0
FEVER: 0
FALLS: 1

## 2018-12-20 NOTE — DISCHARGE PLANNING
Anticipated Discharge Disposition: SNF    Action: Met with patient to discuss discharge plans, agreeable to SNF in [place], choice obtained for [facility], aware that she will probably have to stay at a Skilled Rehab if they do not have beds. She would like us to check first with Lynda. Amee faxed to [facility]    Barriers to Discharge: SNF acceptance.     Plan: SNF

## 2018-12-20 NOTE — PROGRESS NOTES
[facility] Medicine Daily Progress Note    Date of Service  12/20/2018    Chief Complaint  68 y.o. Female hx of DM II, Htn,  COPD, DVT completed Xarelto , OA, Breast Ca  10 years ago, left mastectomy and reconstruction and antihormonal therapy admitted 12/14/2018 with right hip and pelvic pain after fall.     Hospital Course      Post Transsacral screw fixation, right sacral fracture, S1 and S2 on 12-17.  Concern for pathological fx. Remains TTWB to right lower ext .     Interval Problem Update    Bone scan noted uptake in region of Fx and degenerative areas.  Unsteady. She states she is ok with rehab at SNF locally or in [place].  Workup cw iron def anemia.     Consultants/Specialty  Dr. Porter, Orthopedics.   Palliative care.     Code Status    DNR    Disposition    Anticipate SNF for rehab.     Review of Systems  Review of Systems   Constitutional: Negative for chills and fever.   Respiratory: Negative for cough and shortness of breath.    Cardiovascular: Negative for chest pain and palpitations.   Gastrointestinal: Negative for abdominal pain, blood in stool, diarrhea, melena and vomiting.   Musculoskeletal: Positive for falls, joint pain and myalgias.   Neurological: Positive for weakness. Negative for sensory change and speech change.        Physical Exam  Temp:  [36.3 °C (97.3 °F)-37.5 °C (99.5 °F)] 36.4 °C (97.5 °F)  Pulse:  [] 102  Resp:  [17-18] 17  BP: (109-137)/(61-69) 137/63    Physical Exam   Constitutional: She is oriented to person, place, and time.   Alert, approp oriented, NAD   HENT:   Head: Normocephalic and atraumatic.   Eyes: EOM are normal. Right eye exhibits no discharge. Left eye exhibits no discharge. No scleral icterus.   Neck: Neck supple. No JVD present.   Cardiovascular: Normal rate and regular rhythm.    No murmur heard.  Pulmonary/Chest: No stridor. She has no wheezes. She has no rales.   Abdominal: Soft. Bowel sounds are normal. She exhibits no distension. There is no tenderness.    Musculoskeletal: She exhibits tenderness (Mild TTP.  right lateral hip w incision dressing  dry, clean.  Left ankle surgical scar present. ). She exhibits no edema.   Neurological: She is alert and oriented to person, place, and time.   No gross focal weakness   Skin: Skin is warm and dry. She is not diaphoretic. No pallor.   Psychiatric: She has a normal mood and affect. Her behavior is normal.   Vitals reviewed.      Fluids  No intake or output data in the 24 hours ending 12/20/18 0745    Laboratory  Recent Labs      12/18/18   0400  12/19/18   0538  12/20/18   0449   WBC  10.3  6.5  5.4   RBC  4.33  4.52  4.50   HEMOGLOBIN  10.5*  11.3*  11.3*   HEMATOCRIT  34.6*  36.6*  36.5*   MCV  79.9*  81.0*  81.1*   MCH  24.2*  25.0*  25.1*   MCHC  30.3*  30.9*  31.0*   RDW  45.4  47.9  48.3   PLATELETCT  315  344  330   MPV  9.5  10.1  10.1     Recent Labs      12/18/18   0400  12/19/18   0538  12/20/18   0449   SODIUM  133*  133*  133*   POTASSIUM  4.1  4.1  4.2   CHLORIDE  94*  94*  96   CO2  33  29  31   GLUCOSE  131*  145*  126*   BUN  26*  35*  28*   CREATININE  0.80  0.83  0.82   CALCIUM  9.5  9.2  9.4                   Imaging  NM-BONE SCAN WHOLE BODY   Final Result      Right hemisacrum/SI joint increased uptake is compatible with known fracture and recent fracture fixation. Pathologic origin is possible      Mild increased right humeral head level uptake could be degenerative or malignant. Radiographs are advised      Mild left hindfoot uptake is likely degenerative, radiographs could clarify      DX-PELVIS-TRAUMA SERIES  3-   Final Result      Status post right sacral fracture fixation      DX-PORTABLE FLUOROSCOPY < 1 HOUR   Final Result         Portable fluoroscopy utilized for 1 minute 42 seconds.         DX-PELVIS-TRAUMA SERIES  3-   Final Result      Multiple fluoroscopic images were obtained intraoperatively during sacroiliac screw placement. Please see the patient's chart for full procedural details.       Fluoroscopy time 1 minute 42 seconds.   Fluoroscopy dose 47.8 mGy.      CT-PELVIS W/O PLUS RECONS   Final Result      1.  Right sacral fracture highly likely to be pathologic secondary to underlying malignancy      2.  Also, possible abnormal bone within the right ilium which could indicate malignancy.      3.  Osteoarthritis of both hip joint, both sacroiliac joints, lumbar spine facet joint.      4.  Prior hysterectomy      5.  Abdominal aortic atherosclerotic plaque      6.  Colonic diverticulosis      OUTSIDE IMAGES-US VASCULAR   Final Result      CT-FOREIGN FILM CAT SCAN   Final Result      OUTSIDE IMAGES-DX PELVIS   Final Result           Assessment/Plan  * Sacral fracture, closed (HCC)- (present on admission)   Assessment & Plan    post Transsacral screw fixation, right sacral fracture, S1 and S2 on 12-17  Bone scan with findings of uptake in fracture site and over bone degenerative regions.  Apparently no bone bx was done and confirmed with Path - no sample available.  Less likely metastatic dz- Discussed findings with patient .  States she will follow up with Dr. Casillas her oncologist in [place], [place] who has been following her for her Breast Cancer following treatment.   continue prn Iv Dilaudid , Oxycodone, toradol and Zaniflex - Palliative care input  Ortho input.    Sw to look into Snf options.      Microcytic anemia   Assessment & Plan    Likely multi factorial , chronic dz, recent procedural , iron deficient. No symptoms GI or  bleed. Hgb stable.   Start iron supplements  Fu outpt for additional workup.     Elevated alkaline phosphatase level- (present on admission)   Assessment & Plan    Bone scan - non specific for metastatic dz- uptake noted in regions of fx and degenerative areas.      RLS (restless legs syndrome)- (present on admission)   Assessment & Plan    -Chronic     Diabetes mellitus type 2 in obese (HCC)- (present on admission)   Assessment & Plan    Fair control   cw  Metformin, serial  accu checks, sliding scale insulin as needed.      History of breast cancer- (present on admission)   Assessment & Plan    Previously followed with oncologist Dr. Casillas in [facility], last saw them 1-2 years ago.  Ct suspicious for pathologic fx . To follow up with her oncologist and Ortho.      COPD (chronic obstructive pulmonary disease) (HCC)- (present on admission)   Assessment & Plan    No flare up.      UTI (urinary tract infection)- (present on admission)   Assessment & Plan    Had  symptoms of polyuria. Urine culture NGTD.  Antibiotics stopped.      GERD (gastroesophageal reflux disease)- (present on admission)   Assessment & Plan    cw prilosec     Chronic pain- (present on admission)   Assessment & Plan    cw  gabapentin and tizanidine  Norco as needed  Palliative care input.           VTE prophylaxis: Lovenox.     Discussed w  plan of care.

## 2018-12-20 NOTE — DISCHARGE PLANNING
Received Choice form at [id number]  Agency/Facility Name: [facility]  Referral sent per Choice form @ [phone]

## 2018-12-21 LAB
ALBUMIN SERPL BCP-MCNC: 3.1 G/DL (ref 3.2–4.9)
BUN SERPL-MCNC: 23 MG/DL (ref 8–22)
CALCIUM SERPL-MCNC: 9.4 MG/DL (ref 8.5–10.5)
CHLORIDE SERPL-SCNC: 98 MMOL/L (ref 96–112)
CO2 SERPL-SCNC: 30 MMOL/L (ref 20–33)
CREAT SERPL-MCNC: 0.71 MG/DL (ref 0.5–1.4)
ERYTHROCYTE [DISTWIDTH] IN BLOOD BY AUTOMATED COUNT: 49.4 FL (ref 35.9–50)
GLUCOSE BLD-MCNC: 114 MG/DL (ref 65–99)
GLUCOSE BLD-MCNC: 129 MG/DL (ref 65–99)
GLUCOSE BLD-MCNC: 57 MG/DL (ref 65–99)
GLUCOSE BLD-MCNC: 90 MG/DL (ref 65–99)
GLUCOSE SERPL-MCNC: 90 MG/DL (ref 65–99)
HCT VFR BLD AUTO: 34.2 % (ref 37–47)
HGB BLD-MCNC: 10.2 G/DL (ref 12–16)
MCH RBC QN AUTO: 24.5 PG (ref 27–33)
MCHC RBC AUTO-ENTMCNC: 29.8 G/DL (ref 33.6–35)
MCV RBC AUTO: 82.2 FL (ref 81.4–97.8)
PHOSPHATE SERPL-MCNC: 3.8 MG/DL (ref 2.5–4.5)
PLATELET # BLD AUTO: 268 K/UL (ref 164–446)
PMV BLD AUTO: 9.5 FL (ref 9–12.9)
POTASSIUM SERPL-SCNC: 4.4 MMOL/L (ref 3.6–5.5)
RBC # BLD AUTO: 4.16 M/UL (ref 4.2–5.4)
SODIUM SERPL-SCNC: 135 MMOL/L (ref 135–145)
WBC # BLD AUTO: 5.3 K/UL (ref 4.8–10.8)

## 2018-12-21 PROCEDURE — 80069 RENAL FUNCTION PANEL: CPT

## 2018-12-21 PROCEDURE — A9270 NON-COVERED ITEM OR SERVICE: HCPCS | Performed by: ORTHOPAEDIC SURGERY

## 2018-12-21 PROCEDURE — 700102 HCHG RX REV CODE 250 W/ 637 OVERRIDE(OP): Performed by: HOSPITALIST

## 2018-12-21 PROCEDURE — 700112 HCHG RX REV CODE 229: Performed by: ORTHOPAEDIC SURGERY

## 2018-12-21 PROCEDURE — 700111 HCHG RX REV CODE 636 W/ 250 OVERRIDE (IP): Performed by: ORTHOPAEDIC SURGERY

## 2018-12-21 PROCEDURE — 700102 HCHG RX REV CODE 250 W/ 637 OVERRIDE(OP): Performed by: ORTHOPAEDIC SURGERY

## 2018-12-21 PROCEDURE — 770006 HCHG ROOM/CARE - MED/SURG/GYN SEMI*

## 2018-12-21 PROCEDURE — A9270 NON-COVERED ITEM OR SERVICE: HCPCS | Performed by: HOSPITALIST

## 2018-12-21 PROCEDURE — 82962 GLUCOSE BLOOD TEST: CPT | Mod: 91

## 2018-12-21 PROCEDURE — 99232 SBSQ HOSP IP/OBS MODERATE 35: CPT | Performed by: HOSPITALIST

## 2018-12-21 PROCEDURE — 36415 COLL VENOUS BLD VENIPUNCTURE: CPT

## 2018-12-21 PROCEDURE — 85027 COMPLETE CBC AUTOMATED: CPT

## 2018-12-21 RX ADMIN — HYDROMORPHONE HYDROCHLORIDE 0.5 MG: 1 INJECTION, SOLUTION INTRAMUSCULAR; INTRAVENOUS; SUBCUTANEOUS at 22:23

## 2018-12-21 RX ADMIN — FUROSEMIDE 40 MG: 40 TABLET ORAL at 17:09

## 2018-12-21 RX ADMIN — ACETAMINOPHEN 650 MG: 325 TABLET, FILM COATED ORAL at 11:05

## 2018-12-21 RX ADMIN — OXYCODONE HYDROCHLORIDE 10 MG: 10 TABLET ORAL at 15:27

## 2018-12-21 RX ADMIN — ENOXAPARIN SODIUM 40 MG: 100 INJECTION SUBCUTANEOUS at 02:04

## 2018-12-21 RX ADMIN — FUROSEMIDE 40 MG: 40 TABLET ORAL at 05:20

## 2018-12-21 RX ADMIN — OMEPRAZOLE 20 MG: 20 CAPSULE, DELAYED RELEASE ORAL at 05:19

## 2018-12-21 RX ADMIN — FERROUS SULFATE TAB 325 MG (65 MG ELEMENTAL FE) 325 MG: 325 (65 FE) TAB at 09:49

## 2018-12-21 RX ADMIN — GABAPENTIN 300 MG: 300 CAPSULE ORAL at 05:19

## 2018-12-21 RX ADMIN — TIZANIDINE 2 MG: 4 TABLET ORAL at 17:10

## 2018-12-21 RX ADMIN — HYDROMORPHONE HYDROCHLORIDE 0.5 MG: 1 INJECTION, SOLUTION INTRAMUSCULAR; INTRAVENOUS; SUBCUTANEOUS at 03:54

## 2018-12-21 RX ADMIN — STANDARDIZED SENNA CONCENTRATE AND DOCUSATE SODIUM 1 TABLET: 8.6; 5 TABLET, FILM COATED ORAL at 20:51

## 2018-12-21 RX ADMIN — GABAPENTIN 300 MG: 300 CAPSULE ORAL at 17:08

## 2018-12-21 RX ADMIN — ENOXAPARIN SODIUM 40 MG: 100 INJECTION SUBCUTANEOUS at 23:27

## 2018-12-21 RX ADMIN — LOSARTAN POTASSIUM 50 MG: 50 TABLET ORAL at 05:20

## 2018-12-21 RX ADMIN — ACETAMINOPHEN 650 MG: 325 TABLET, FILM COATED ORAL at 02:03

## 2018-12-21 RX ADMIN — DOCUSATE SODIUM 100 MG: 100 CAPSULE, LIQUID FILLED ORAL at 05:20

## 2018-12-21 RX ADMIN — PRAVASTATIN SODIUM 40 MG: 10 TABLET ORAL at 20:51

## 2018-12-21 RX ADMIN — ACETAMINOPHEN 650 MG: 325 TABLET, FILM COATED ORAL at 06:00

## 2018-12-21 RX ADMIN — ACETAMINOPHEN 650 MG: 325 TABLET, FILM COATED ORAL at 23:27

## 2018-12-21 RX ADMIN — TIZANIDINE 2 MG: 4 TABLET ORAL at 05:19

## 2018-12-21 RX ADMIN — OXYCODONE HYDROCHLORIDE 10 MG: 10 TABLET ORAL at 05:20

## 2018-12-21 RX ADMIN — OXYCODONE HYDROCHLORIDE 10 MG: 10 TABLET ORAL at 20:57

## 2018-12-21 RX ADMIN — PRAMIPEXOLE DIHYDROCHLORIDE 0.25 MG: 0.25 TABLET ORAL at 22:07

## 2018-12-21 RX ADMIN — ACETAMINOPHEN 650 MG: 325 TABLET, FILM COATED ORAL at 17:09

## 2018-12-21 RX ADMIN — OXYCODONE HYDROCHLORIDE 10 MG: 10 TABLET ORAL at 09:49

## 2018-12-21 ASSESSMENT — ENCOUNTER SYMPTOMS
SPEECH CHANGE: 0
SHORTNESS OF BREATH: 0
MYALGIAS: 1
SENSORY CHANGE: 0
DIARRHEA: 0
STRIDOR: 0
FEVER: 0
PALPITATIONS: 0
VOMITING: 0
WEAKNESS: 1
ABDOMINAL PAIN: 0
FALLS: 1
BLOOD IN STOOL: 0
HALLUCINATIONS: 0
CHILLS: 0
COUGH: 0

## 2018-12-21 ASSESSMENT — PAIN SCALES - GENERAL
PAINLEVEL_OUTOF10: 7
PAINLEVEL_OUTOF10: 2
PAINLEVEL_OUTOF10: 7
PAINLEVEL_OUTOF10: 10
PAINLEVEL_OUTOF10: 7
PAINLEVEL_OUTOF10: 10
PAINLEVEL_OUTOF10: 2
PAINLEVEL_OUTOF10: 7
PAINLEVEL_OUTOF10: 4

## 2018-12-21 ASSESSMENT — LIFESTYLE VARIABLES: SUBSTANCE_ABUSE: 0

## 2018-12-21 NOTE — DISCHARGE PLANNING
Agency/Facility Name: [facility]  Spoke To: Radha   Outcome: Will review with DIT this AM and call this CCA back

## 2018-12-21 NOTE — PROGRESS NOTES
Patient seen and examined  S/P SI screw    Blood pressure (!) 162/87, pulse 90, temperature 36.9 °C (98.4 °F), temperature source Temporal, resp. rate 19, height 1.524 m (5'), weight 77.8 kg (171 lb 8.3 oz), SpO2 93 %, not currently breastfeeding.    Recent Labs      12/19/18   0538  12/20/18   0449  12/21/18   0419   WBC  6.5  5.4  5.3   RBC  4.52  4.50  4.16*   HEMOGLOBIN  11.3*  11.3*  10.2*   HEMATOCRIT  36.6*  36.5*  34.2*   MCV  81.0*  81.1*  82.2   MCH  25.0*  25.1*  24.5*   MCHC  30.9*  31.0*  29.8*   RDW  47.9  48.3  49.4   PLATELETCT  344  330  268   MPV  10.1  10.1  9.5       No acute distress  Dressing clean dry and intact  Neurovascularly intact    POD#4    Plan:  DVT Prophylaxis- TEDS/SCDs, lovenox  Weight Bearing Status-TTWB RLE  PT/OT  Antibiotics: None  Case Coordination

## 2018-12-21 NOTE — PROGRESS NOTES
Orthopaedic PA Progress Note    Interval changes:Resting comfortably. Dispo per Med/PT/OT team, favoring SNF/Rehab in [place]. Follow-Up: needs appointment with Dr. Porter at [facility] at 10-14 days post-op for re-evaluation, staple removal and radiographs.    ROS - Patient denies any new issues. No chest pain, dyspnea, or fever.  Pain well controlled.    Blood pressure 143/73, pulse 80, temperature 36.9 °C (98.5 °F), temperature source Temporal, resp. rate 16, height 1.524 m (5'), weight 77.8 kg (171 lb 8.3 oz), SpO2 92 %, not currently breastfeeding.    Patient seen and examined  No acute distress  Breathing non labored  RRR  Surgical dressing is clean, dry, and intact. Patient clearly fires tibialis anterior, EHL, and gastrocnemius/soleus. Sensation is intact to light touch throughout superficial peroneal, deep peroneal, tibial, saphenous, and sural nerve distributions. Strong and palpable 2+ dorsalis pedis and posterior tibial pulses with capillary refill less than 2 seconds. No lower leg tenderness or discomfort.    Recent Labs      12/18/18   0400  12/19/18   0538  12/20/18   0449   WBC  10.3  6.5  5.4   RBC  4.33  4.52  4.50   HEMOGLOBIN  10.5*  11.3*  11.3*   HEMATOCRIT  34.6*  36.6*  36.5*   MCV  79.9*  81.0*  81.1*   MCH  24.2*  25.0*  25.1*   MCHC  30.3*  30.9*  31.0*   RDW  45.4  47.9  48.3   PLATELETCT  315  344  330   MPV  9.5  10.1  10.1       Active Hospital Problems    Diagnosis   • Sacral fracture, closed ([facility]) [S32.10XA]     Priority: High   • Microcytic anemia [D50.9]     Priority: Medium   • Elevated alkaline phosphatase level [R74.8]     Priority: Medium   • UTI (urinary tract infection) [N39.0]     Priority: Medium   • COPD (chronic obstructive pulmonary disease) ([facility]) [J44.9]     Priority: Medium   • History of breast cancer [Z85.3]     Priority: Medium   • Diabetes mellitus type 2 in obese ([facility]) [E11.69, E66.9]     Priority: Medium   • RLS (restless legs syndrome)  [G25.81]     Priority: Medium   • Chronic pain [G89.29]     Priority: Low   • GERD (gastroesophageal reflux disease) [K21.9]     Priority: Low     Assessment/Plan:  Palliative consult today  POD#3 S/P Transsacral screw fixation, right sacral fracture, S1 and S2.  This was a minimally invasive percutaneous technique utilizing cannulated screw fixation, to my knowledge there were no reamings to be sent to path, communicated same to Nursing/Med teams  Wt bearing status - TTWB RLE  Wound care/Drains - dressings changed every other day by nursing starting POD#2  Future Procedures - none planned  Lovenox: Start 12/18, Duration-until ambulatory > 150'  Sutures/Staples out- 10-14 days post operatively  PT/OT-initiated  Antibiotics: completed  DVT Prophylaxis- TEDS/SCDs/Foot pumps  Sue-none  Case Coordination for Discharge Planning - Disposition pending therapy/Palliative  recs

## 2018-12-22 LAB
ALBUMIN SERPL BCP-MCNC: 3.3 G/DL (ref 3.2–4.9)
BUN SERPL-MCNC: 19 MG/DL (ref 8–22)
CALCIUM SERPL-MCNC: 9.7 MG/DL (ref 8.5–10.5)
CHLORIDE SERPL-SCNC: 95 MMOL/L (ref 96–112)
CO2 SERPL-SCNC: 30 MMOL/L (ref 20–33)
CREAT SERPL-MCNC: 0.67 MG/DL (ref 0.5–1.4)
ERYTHROCYTE [DISTWIDTH] IN BLOOD BY AUTOMATED COUNT: 48 FL (ref 35.9–50)
GLUCOSE BLD-MCNC: 108 MG/DL (ref 65–99)
GLUCOSE BLD-MCNC: 143 MG/DL (ref 65–99)
GLUCOSE BLD-MCNC: 86 MG/DL (ref 65–99)
GLUCOSE BLD-MCNC: 95 MG/DL (ref 65–99)
GLUCOSE SERPL-MCNC: 102 MG/DL (ref 65–99)
HCT VFR BLD AUTO: 34.4 % (ref 37–47)
HGB BLD-MCNC: 10.6 G/DL (ref 12–16)
MCH RBC QN AUTO: 25.1 PG (ref 27–33)
MCHC RBC AUTO-ENTMCNC: 30.8 G/DL (ref 33.6–35)
MCV RBC AUTO: 81.3 FL (ref 81.4–97.8)
PHOSPHATE SERPL-MCNC: 4.1 MG/DL (ref 2.5–4.5)
PLATELET # BLD AUTO: 258 K/UL (ref 164–446)
PMV BLD AUTO: 10 FL (ref 9–12.9)
POTASSIUM SERPL-SCNC: 4.3 MMOL/L (ref 3.6–5.5)
RBC # BLD AUTO: 4.23 M/UL (ref 4.2–5.4)
SODIUM SERPL-SCNC: 133 MMOL/L (ref 135–145)
WBC # BLD AUTO: 5.8 K/UL (ref 4.8–10.8)

## 2018-12-22 PROCEDURE — A9270 NON-COVERED ITEM OR SERVICE: HCPCS | Performed by: HOSPITALIST

## 2018-12-22 PROCEDURE — 770006 HCHG ROOM/CARE - MED/SURG/GYN SEMI*

## 2018-12-22 PROCEDURE — 700102 HCHG RX REV CODE 250 W/ 637 OVERRIDE(OP): Performed by: HOSPITALIST

## 2018-12-22 PROCEDURE — 700112 HCHG RX REV CODE 229: Performed by: ORTHOPAEDIC SURGERY

## 2018-12-22 PROCEDURE — 36415 COLL VENOUS BLD VENIPUNCTURE: CPT

## 2018-12-22 PROCEDURE — 80069 RENAL FUNCTION PANEL: CPT

## 2018-12-22 PROCEDURE — A9270 NON-COVERED ITEM OR SERVICE: HCPCS | Performed by: ORTHOPAEDIC SURGERY

## 2018-12-22 PROCEDURE — 82962 GLUCOSE BLOOD TEST: CPT | Mod: 91

## 2018-12-22 PROCEDURE — 85027 COMPLETE CBC AUTOMATED: CPT

## 2018-12-22 PROCEDURE — 700102 HCHG RX REV CODE 250 W/ 637 OVERRIDE(OP): Performed by: ORTHOPAEDIC SURGERY

## 2018-12-22 PROCEDURE — 99232 SBSQ HOSP IP/OBS MODERATE 35: CPT | Performed by: HOSPITALIST

## 2018-12-22 RX ADMIN — FUROSEMIDE 40 MG: 40 TABLET ORAL at 16:44

## 2018-12-22 RX ADMIN — OXYCODONE HYDROCHLORIDE 10 MG: 10 TABLET ORAL at 03:03

## 2018-12-22 RX ADMIN — OXYCODONE HYDROCHLORIDE 10 MG: 10 TABLET ORAL at 21:15

## 2018-12-22 RX ADMIN — LOSARTAN POTASSIUM 50 MG: 50 TABLET ORAL at 05:08

## 2018-12-22 RX ADMIN — TIZANIDINE 2 MG: 4 TABLET ORAL at 05:08

## 2018-12-22 RX ADMIN — GABAPENTIN 300 MG: 300 CAPSULE ORAL at 05:08

## 2018-12-22 RX ADMIN — TIZANIDINE 2 MG: 4 TABLET ORAL at 16:44

## 2018-12-22 RX ADMIN — PRAVASTATIN SODIUM 40 MG: 10 TABLET ORAL at 21:15

## 2018-12-22 RX ADMIN — ACETAMINOPHEN 650 MG: 325 TABLET, FILM COATED ORAL at 05:08

## 2018-12-22 RX ADMIN — FUROSEMIDE 40 MG: 40 TABLET ORAL at 05:08

## 2018-12-22 RX ADMIN — FERROUS SULFATE TAB 325 MG (65 MG ELEMENTAL FE) 325 MG: 325 (65 FE) TAB at 05:08

## 2018-12-22 RX ADMIN — PRAMIPEXOLE DIHYDROCHLORIDE 0.25 MG: 0.25 TABLET ORAL at 17:40

## 2018-12-22 RX ADMIN — DOCUSATE SODIUM 100 MG: 100 CAPSULE, LIQUID FILLED ORAL at 05:08

## 2018-12-22 RX ADMIN — GABAPENTIN 300 MG: 300 CAPSULE ORAL at 16:44

## 2018-12-22 RX ADMIN — OXYCODONE HYDROCHLORIDE 10 MG: 10 TABLET ORAL at 16:45

## 2018-12-22 RX ADMIN — DOCUSATE SODIUM 100 MG: 100 CAPSULE, LIQUID FILLED ORAL at 16:44

## 2018-12-22 RX ADMIN — ACETAMINOPHEN 650 MG: 325 TABLET, FILM COATED ORAL at 12:24

## 2018-12-22 RX ADMIN — OMEPRAZOLE 20 MG: 20 CAPSULE, DELAYED RELEASE ORAL at 05:08

## 2018-12-22 ASSESSMENT — PAIN SCALES - GENERAL
PAINLEVEL_OUTOF10: 8
PAINLEVEL_OUTOF10: 8
PAINLEVEL_OUTOF10: 10
PAINLEVEL_OUTOF10: 10

## 2018-12-22 ASSESSMENT — ENCOUNTER SYMPTOMS
HALLUCINATIONS: 0
ABDOMINAL PAIN: 0
COUGH: 0
SHORTNESS OF BREATH: 0
CHILLS: 0
BLOOD IN STOOL: 0
WEAKNESS: 1
SENSORY CHANGE: 0
SPEECH CHANGE: 0
STRIDOR: 0
FALLS: 1
MYALGIAS: 1
DIARRHEA: 0
FEVER: 0
VOMITING: 0

## 2018-12-22 ASSESSMENT — LIFESTYLE VARIABLES: SUBSTANCE_ABUSE: 0

## 2018-12-22 NOTE — PROGRESS NOTES
[facility] Medicine Daily Progress Note    Date of Service  12/22/2018    Chief Complaint  68 y.o. Female hx of DM II, Htn,  COPD, DVT completed Xarelto , OA, Breast Ca  10 years ago, left mastectomy and reconstruction and antihormonal therapy admitted 12/14/2018 with right hip and pelvic pain after fall.     Hospital Course      Post Transsacral screw fixation, right sacral fracture, S1 and S2 on 12-17.  Concern for pathological fx. Remains TTWB to right lower ext .     Interval Problem Update    Pain managed with oxycodone.  Difficulty with mobilization due to toe-touch weightbearing right lower extremity and chronic pain in left lower extremity.     Consultants/Specialty  Dr. Porter, Orthopedics.   Palliative care.   Dr. Orellana    Code Status    DNR    Disposition    Anticipate SNF for rehab.     Review of Systems  Review of Systems   Constitutional: Negative for chills and fever.   HENT: Negative for congestion.    Respiratory: Negative for cough, shortness of breath and stridor.    Cardiovascular: Negative for chest pain and leg swelling.   Gastrointestinal: Negative for abdominal pain, blood in stool, diarrhea, melena and vomiting.   Musculoskeletal: Positive for falls, joint pain and myalgias.        Chronic right shoulder pain back pain.  Bilateral lower extremity pain   Neurological: Positive for weakness. Negative for sensory change and speech change.   Psychiatric/Behavioral: Negative for hallucinations and substance abuse.        Physical Exam  Temp:  [36.3 °C (97.3 °F)-37.2 °C (99 °F)] 36.4 °C (97.5 °F)  Pulse:  [] 83  Resp:  [15-17] 17  BP: (100-148)/(53-78) 108/53    Physical Exam   Constitutional: She is oriented to person, place, and time. No distress.   Alert, approp oriented, NAD   HENT:   Head: Normocephalic and atraumatic.   Eyes: EOM are normal. Right eye exhibits no discharge. Left eye exhibits no discharge. No scleral icterus.   Neck: Neck supple. No JVD present.   Cardiovascular:  Normal rate and regular rhythm.    No murmur heard.  Pulmonary/Chest: No stridor. She has no wheezes. She has no rales.   Abdominal: Soft. Bowel sounds are normal. She exhibits no distension. There is no tenderness.   Musculoskeletal: She exhibits tenderness (right lateral hip w incision dressing  dry, clean.  Left ankle surgical scar present. ). She exhibits no edema.   Neurological: She is alert and oriented to person, place, and time.   No gross focal weakness   Skin: Skin is warm and dry. She is not diaphoretic. No pallor.   Psychiatric: She has a normal mood and affect. Her behavior is normal.   Vitals reviewed.      Fluids    Intake/Output Summary (Last 24 hours) at 12/22/18 1046  Last data filed at 12/22/18 0900   Gross per 24 hour   Intake              660 ml   Output                0 ml   Net              660 ml       Laboratory  Recent Labs      12/20/18 0449 12/21/18 0419  12/22/18   0433   WBC  5.4  5.3  5.8   RBC  4.50  4.16*  4.23   HEMOGLOBIN  11.3*  10.2*  10.6*   HEMATOCRIT  36.5*  34.2*  34.4*   MCV  81.1*  82.2  81.3*   MCH  25.1*  24.5*  25.1*   MCHC  31.0*  29.8*  30.8*   RDW  48.3  49.4  48.0   PLATELETCT  330  268  258   MPV  10.1  9.5  10.0     Recent Labs      12/20/18 0449  12/21/18   0419  12/22/18   0433   SODIUM  133*  135  133*   POTASSIUM  4.2  4.4  4.3   CHLORIDE  96  98  95*   CO2  31  30  30   GLUCOSE  126*  90  102*   BUN  28*  23*  19   CREATININE  0.82  0.71  0.67   CALCIUM  9.4  9.4  9.7                   Imaging  NM-BONE SCAN WHOLE BODY   Final Result      Right hemisacrum/SI joint increased uptake is compatible with known fracture and recent fracture fixation. Pathologic origin is possible      Mild increased right humeral head level uptake could be degenerative or malignant. Radiographs are advised      Mild left hindfoot uptake is likely degenerative, radiographs could clarify      DX-PELVIS-TRAUMA SERIES  3-   Final Result      Status post right sacral fracture  fixation      DX-PORTABLE FLUOROSCOPY < 1 HOUR   Final Result         Portable fluoroscopy utilized for 1 minute 42 seconds.         DX-PELVIS-TRAUMA SERIES  3-   Final Result      Multiple fluoroscopic images were obtained intraoperatively during sacroiliac screw placement. Please see the patient's chart for full procedural details.      Fluoroscopy time 1 minute 42 seconds.   Fluoroscopy dose 47.8 mGy.      CT-PELVIS W/O PLUS RECONS   Final Result      1.  Right sacral fracture highly likely to be pathologic secondary to underlying malignancy      2.  Also, possible abnormal bone within the right ilium which could indicate malignancy.      3.  Osteoarthritis of both hip joint, both sacroiliac joints, lumbar spine facet joint.      4.  Prior hysterectomy      5.  Abdominal aortic atherosclerotic plaque      6.  Colonic diverticulosis      OUTSIDE IMAGES-US VASCULAR   Final Result      CT-FOREIGN FILM CAT SCAN   Final Result      OUTSIDE IMAGES-DX PELVIS   Final Result           Assessment/Plan  * Sacral fracture, closed (HCC)- (present on admission)   Assessment & Plan    post Transsacral screw fixation, right sacral fracture, S1 and S2 on 12-17  Bone scan with findings of uptake in fracture site and over bone degenerative regions and joints.  Apparently no bone bx was done and confirmed with Path - no sample available.  Less likely metastatic dz- Discussed findings with patient .  States she will follow up with Dr. Casillas her oncologist in [place] who has been following her for her Breast Cancer following treatment.     continue prn Iv Dilaudid , Oxycodone, toradol and Zaniflex .   Ortho input.     for discharge planning       Microcytic anemia   Assessment & Plan    Likely multi factorial , chronic dz, recent procedural , iron deficient. No symptoms GI or  bleed. Hgb stable.   Start iron supplements  Fu outpt for additional workup.     Elevated alkaline phosphatase level- (present on admission)    Assessment & Plan    Bone scan - non specific for metastatic dz- uptake noted in regions of fx and degenerative areas.  GGT elevation may also suggest elevated alkaline phosphatase due to hepatic source.  Of unclear etiology as her abdominal exam benign, no acute GI symptoms.  Outpatient follow-up recommended.     RLS (restless legs syndrome)- (present on admission)   Assessment & Plan    -Chronic     Diabetes mellitus type 2 in obese (HCC)- (present on admission)   Assessment & Plan    Fair control   cw  Metformin, serial accu checks, sliding scale insulin as needed.      History of breast cancer- (present on admission)   Assessment & Plan    Previously followed with oncologist Dr. Casillas in [facility], last saw them 1-2 years ago.  Ct suspicious for pathologic fx .  Bone scan abnormal uptake n regions of degenerative changes and chronic arthritis.  To follow up with her oncologist and Ortho.  May need further evaluation with PET scan, can only be done outpatient.     COPD (chronic obstructive pulmonary disease) (HCC)- (present on admission)   Assessment & Plan    No flare up.      UTI (urinary tract infection)- (present on admission)   Assessment & Plan    Had  symptoms of polyuria. Urine culture NGTD.  Antibiotics stopped.      GERD (gastroesophageal reflux disease)- (present on admission)   Assessment & Plan    cw prilosec     Chronic pain- (present on admission)   Assessment & Plan    cw  gabapentin and tizanidine  Norco as needed  Palliative care input.           VTE prophylaxis: Lovenox.

## 2018-12-22 NOTE — PROGRESS NOTES
Orthopaedic PA Progress Note    Interval changes:Did well overnight    ROS - Patient denies any new issues. No chest pain, dyspnea, or fever.  Pain well controlled.    Blood pressure 108/53, pulse 83, temperature 36.4 °C (97.5 °F), temperature source Temporal, resp. rate 17, height 1.524 m (5'), weight 77.8 kg (171 lb 8.3 oz), SpO2 91 %, not currently breastfeeding.    Patient seen and examined  OOB in chair knitting  No acute distress  Breathing non labored  RRR  Surgical dressing is clean, dry, and intact. Patient clearly fires tibialis anterior, EHL, and gastrocnemius/soleus. Sensation is intact to light touch throughout superficial peroneal, deep peroneal, tibial, saphenous, and sural nerve distributions. Strong and palpable 2+ dorsalis pedis and posterior tibial pulses with capillary refill less than 2 seconds. No lower leg tenderness or discomfort.    Recent Labs      12/20/18   0449  12/21/18   0419  12/22/18   0433   WBC  5.4  5.3  5.8   RBC  4.50  4.16*  4.23   HEMOGLOBIN  11.3*  10.2*  10.6*   HEMATOCRIT  36.5*  34.2*  34.4*   MCV  81.1*  82.2  81.3*   MCH  25.1*  24.5*  25.1*   MCHC  31.0*  29.8*  30.8*   RDW  48.3  49.4  48.0   PLATELETCT  330  268  258   MPV  10.1  9.5  10.0       Active Hospital Problems    Diagnosis   • Sacral fracture, closed ([facility]) [S32.10XA]     Priority: High   • Microcytic anemia [D50.9]     Priority: Medium   • Elevated alkaline phosphatase level [R74.8]     Priority: Medium   • UTI (urinary tract infection) [N39.0]     Priority: Medium   • COPD (chronic obstructive pulmonary disease) ([facility]) [J44.9]     Priority: Medium   • History of breast cancer [Z85.3]     Priority: Medium   • Diabetes mellitus type 2 in obese ([facility]) [E11.69, E66.9]     Priority: Medium   • RLS (restless legs syndrome) [G25.81]     Priority: Medium   • Chronic pain [G89.29]     Priority: Low   • GERD (gastroesophageal reflux disease) [K21.9]     Priority: Low     Assessment/Plan:  POD#5 S/P Transsacral screw  fixation, right sacral fracture, S1 and S2.  This was a minimally invasive percutaneous technique utilizing cannulated screw fixation, to my knowledge there were no reamings to be sent to path, communicated same to Nursing/Med teams  Wt bearing status - TTWB RLE  Wound care/Drains - dressings changed every other day by nursing starting POD#2  Future Procedures - none planned  Lovenox: Start 12/18, Duration-until ambulatory > 150'  Sutures/Staples out- 10-14 days post operatively  PT/OT-initiated  Antibiotics: completed  DVT Prophylaxis- TEDS/SCDs/Foot pumps  Sue-none  Case Coordination for Discharge Planning - Disposition per Med

## 2018-12-22 NOTE — DISCHARGE PLANNING
Received Choice form at [phone]  Agency/Facility Name: 1. [facility]. [facility]. [facility]  Referral sent per Choice form @ 0301

## 2018-12-22 NOTE — PROGRESS NOTES
Hospital Medicine Daily Progress Note    Date of Service  12/21/2018    Chief Complaint  68 y.o. Female hx of DM II, Htn,  COPD, DVT completed Xarelto , OA, Breast Ca  10 years ago, left mastectomy and reconstruction and antihormonal therapy admitted 12/14/2018 with right hip and pelvic pain after fall.     Hospital Course      Post Transsacral screw fixation, right sacral fracture, S1 and S2 on 12-17.  Concern for pathological fx. Remains TTWB to right lower ext .     Interval Problem Update    Remains debilitated with  working on placement nursing facility for rehab.  Reports  not ambulatory as chronically has left leg pain and weakness but now cannot also bear weight on right lower extremity.    Consultants/Specialty  Dr. Porter, Orthopedics.   Palliative care.   Dr. Orellana    Code Status    DNR    Disposition    Anticipate SNF for rehab.     Review of Systems  Review of Systems   Constitutional: Negative for chills and fever.   HENT: Negative for congestion.    Respiratory: Negative for cough, shortness of breath and stridor.    Cardiovascular: Negative for chest pain, palpitations and leg swelling.   Gastrointestinal: Negative for abdominal pain, blood in stool, diarrhea, melena and vomiting.   Musculoskeletal: Positive for falls, joint pain and myalgias.        Chronic right shoulder pain back pain she attributes to arthritis   Neurological: Positive for weakness. Negative for sensory change and speech change.   Psychiatric/Behavioral: Negative for hallucinations and substance abuse.        Physical Exam  Temp:  [36.9 °C (98.4 °F)-37.2 °C (99 °F)] 36.9 °C (98.4 °F)  Pulse:  [80-97] 97  Resp:  [16-19] 19  BP: (143-162)/(73-87) 155/81    Physical Exam   Constitutional: She is oriented to person, place, and time.   Alert, approp oriented, NAD   HENT:   Head: Normocephalic and atraumatic.   Eyes: EOM are normal. Right eye exhibits no discharge. Left eye exhibits no discharge. No scleral icterus.    Neck: Neck supple. No JVD present.   Cardiovascular: Normal rate and regular rhythm.    No murmur heard.  Pulmonary/Chest: No stridor. She has no wheezes. She has no rales.   Abdominal: Soft. Bowel sounds are normal. She exhibits no distension. There is no tenderness.   Musculoskeletal: She exhibits tenderness (Mild TTP.  right lateral hip w incision dressing  dry, clean.  Left ankle surgical scar present. ). She exhibits no edema.   Neurological: She is alert and oriented to person, place, and time.   No gross focal weakness   Skin: Skin is warm and dry. She is not diaphoretic. No pallor.   Psychiatric: She has a normal mood and affect. Her behavior is normal.   Vitals reviewed.      Fluids    Intake/Output Summary (Last 24 hours) at 12/21/18 1649  Last data filed at 12/21/18 1300   Gross per 24 hour   Intake              600 ml   Output              450 ml   Net              150 ml       Laboratory  Recent Labs      12/19/18   0538  12/20/18   0449  12/21/18   0419   WBC  6.5  5.4  5.3   RBC  4.52  4.50  4.16*   HEMOGLOBIN  11.3*  11.3*  10.2*   HEMATOCRIT  36.6*  36.5*  34.2*   MCV  81.0*  81.1*  82.2   MCH  25.0*  25.1*  24.5*   MCHC  30.9*  31.0*  29.8*   RDW  47.9  48.3  49.4   PLATELETCT  344  330  268   MPV  10.1  10.1  9.5     Recent Labs      12/19/18   0538  12/20/18   0449  12/21/18   0419   SODIUM  133*  133*  135   POTASSIUM  4.1  4.2  4.4   CHLORIDE  94*  96  98   CO2  29  31  30   GLUCOSE  145*  126*  90   BUN  35*  28*  23*   CREATININE  0.83  0.82  0.71   CALCIUM  9.2  9.4  9.4                   Imaging  NM-BONE SCAN WHOLE BODY   Final Result      Right hemisacrum/SI joint increased uptake is compatible with known fracture and recent fracture fixation. Pathologic origin is possible      Mild increased right humeral head level uptake could be degenerative or malignant. Radiographs are advised      Mild left hindfoot uptake is likely degenerative, radiographs could clarify      DX-PELVIS-TRAUMA  SERIES  3-   Final Result      Status post right sacral fracture fixation      DX-PORTABLE FLUOROSCOPY < 1 HOUR   Final Result         Portable fluoroscopy utilized for 1 minute 42 seconds.         DX-PELVIS-TRAUMA SERIES  3-   Final Result      Multiple fluoroscopic images were obtained intraoperatively during sacroiliac screw placement. Please see the patient's chart for full procedural details.      Fluoroscopy time 1 minute 42 seconds.   Fluoroscopy dose 47.8 mGy.      CT-PELVIS W/O PLUS RECONS   Final Result      1.  Right sacral fracture highly likely to be pathologic secondary to underlying malignancy      2.  Also, possible abnormal bone within the right ilium which could indicate malignancy.      3.  Osteoarthritis of both hip joint, both sacroiliac joints, lumbar spine facet joint.      4.  Prior hysterectomy      5.  Abdominal aortic atherosclerotic plaque      6.  Colonic diverticulosis      OUTSIDE IMAGES-US VASCULAR   Final Result      CT-FOREIGN FILM CAT SCAN   Final Result      OUTSIDE IMAGES-DX PELVIS   Final Result           Assessment/Plan  * Sacral fracture, closed (HCC)- (present on admission)   Assessment & Plan    post Transsacral screw fixation, right sacral fracture, S1 and S2 on 12-17  Bone scan with findings of uptake in fracture site and over bone degenerative regions and joints.  Apparently no bone bx was done and confirmed with Path - no sample available.  Less likely metastatic dz- Discussed findings with patient .  States she will follow up with Dr. Casillas her oncologist in [place] who has been following her for her Breast Cancer following treatment.     continue prn Iv Dilaudid , Oxycodone, toradol and Zaniflex .   Ortho input.    Sw to look into Snf options.   His case with Peyman Mead     Microcytic anemia   Assessment & Plan    Likely multi factorial , chronic dz, recent procedural , iron deficient. No symptoms GI or  bleed. Hgb stable.   Start iron supplements  Fu  outpt for additional workup.     Elevated alkaline phosphatase level- (present on admission)   Assessment & Plan    Bone scan - non specific for metastatic dz- uptake noted in regions of fx and degenerative areas.      RLS (restless legs syndrome)- (present on admission)   Assessment & Plan    -Chronic     Diabetes mellitus type 2 in obese (HCC)- (present on admission)   Assessment & Plan    Fair control   cw  Metformin, serial accu checks, sliding scale insulin as needed.      History of breast cancer- (present on admission)   Assessment & Plan    Previously followed with oncologist Dr. Casillas in [facility], last saw them 1-2 years ago.  Ct suspicious for pathologic fx . To follow up with her oncologist and Ortho.      COPD (chronic obstructive pulmonary disease) (HCC)- (present on admission)   Assessment & Plan    No flare up.      UTI (urinary tract infection)- (present on admission)   Assessment & Plan    Had  symptoms of polyuria. Urine culture NGTD.  Antibiotics stopped.      GERD (gastroesophageal reflux disease)- (present on admission)   Assessment & Plan    cw prilosec     Chronic pain- (present on admission)   Assessment & Plan    cw  gabapentin and tizanidine  Norco as needed  Palliative care input.           VTE prophylaxis: Lovenox.     Discussed with  plan of care

## 2018-12-22 NOTE — DISCHARGE PLANNING
Anticipated Discharge Disposition: SNF    Action: LSW received a voicemail from [facility] ([phone]) requesting information on pt's discharge plan. [facility] is not listed on pt's facesheet.     Barriers to Discharge: None    Plan: Discuss [facility] with pt and contact him if pt is ok with this information being given to [facility].

## 2018-12-22 NOTE — DISCHARGE PLANNING
Received call from [facility] they have accepted patient.  Received notice from [facility] they have accepted patient.

## 2018-12-22 NOTE — DISCHARGE PLANNING
Met with patient agreeable to stay in [place] for skilled rehab if necessary.  Verbal choice obtained for #1 [facility] #2 [facility] and [facility] and #3 [facility]. Faxed to [facility]

## 2018-12-22 NOTE — PROGRESS NOTES
Shift report received from night RN, assumed care at 0715. Patient up in chair, routinely medicated prn per MAR. AOx4, up x1, FWW to pivot to commode, calls appropriately, bed alarm in place. PIV assessed and locked. Dressing intact, O2 RA, SPO2 95%. VTE Lovenox and SCDs. Plan is SNF placement when accepted. Needs met at this time.    Discussed POC for multimodal pain management, monitoring VS/labs/I&O, mobility, day time routine, comfort, and safety. Patient has call light and personal belongings within reach. Safety and fall precautions in place. Reviewed current labs, notes, medications, and orders. Hourly rounding in place with RN and CNA.

## 2018-12-23 LAB
ALBUMIN SERPL BCP-MCNC: 3.5 G/DL (ref 3.2–4.9)
BUN SERPL-MCNC: 16 MG/DL (ref 8–22)
CALCIUM SERPL-MCNC: 10.1 MG/DL (ref 8.5–10.5)
CHLORIDE SERPL-SCNC: 94 MMOL/L (ref 96–112)
CO2 SERPL-SCNC: 26 MMOL/L (ref 20–33)
CREAT SERPL-MCNC: 0.67 MG/DL (ref 0.5–1.4)
ERYTHROCYTE [DISTWIDTH] IN BLOOD BY AUTOMATED COUNT: 54.2 FL (ref 35.9–50)
GLUCOSE BLD-MCNC: 105 MG/DL (ref 65–99)
GLUCOSE BLD-MCNC: 88 MG/DL (ref 65–99)
GLUCOSE BLD-MCNC: 92 MG/DL (ref 65–99)
GLUCOSE BLD-MCNC: 94 MG/DL (ref 65–99)
GLUCOSE SERPL-MCNC: 102 MG/DL (ref 65–99)
HCT VFR BLD AUTO: 39 % (ref 37–47)
HGB BLD-MCNC: 11.1 G/DL (ref 12–16)
MCH RBC QN AUTO: 24.9 PG (ref 27–33)
MCHC RBC AUTO-ENTMCNC: 28.5 G/DL (ref 33.6–35)
MCV RBC AUTO: 87.4 FL (ref 81.4–97.8)
PHOSPHATE SERPL-MCNC: 4 MG/DL (ref 2.5–4.5)
PLATELET # BLD AUTO: 314 K/UL (ref 164–446)
PMV BLD AUTO: 10.6 FL (ref 9–12.9)
POTASSIUM SERPL-SCNC: 4 MMOL/L (ref 3.6–5.5)
RBC # BLD AUTO: 4.46 M/UL (ref 4.2–5.4)
SODIUM SERPL-SCNC: 129 MMOL/L (ref 135–145)
WBC # BLD AUTO: 5.8 K/UL (ref 4.8–10.8)

## 2018-12-23 PROCEDURE — 700102 HCHG RX REV CODE 250 W/ 637 OVERRIDE(OP): Performed by: HOSPITALIST

## 2018-12-23 PROCEDURE — 99232 SBSQ HOSP IP/OBS MODERATE 35: CPT | Performed by: HOSPITALIST

## 2018-12-23 PROCEDURE — A9270 NON-COVERED ITEM OR SERVICE: HCPCS | Performed by: HOSPITALIST

## 2018-12-23 PROCEDURE — A9270 NON-COVERED ITEM OR SERVICE: HCPCS | Performed by: ORTHOPAEDIC SURGERY

## 2018-12-23 PROCEDURE — 770006 HCHG ROOM/CARE - MED/SURG/GYN SEMI*

## 2018-12-23 PROCEDURE — 36415 COLL VENOUS BLD VENIPUNCTURE: CPT

## 2018-12-23 PROCEDURE — 82962 GLUCOSE BLOOD TEST: CPT

## 2018-12-23 PROCEDURE — 700112 HCHG RX REV CODE 229: Performed by: ORTHOPAEDIC SURGERY

## 2018-12-23 PROCEDURE — 700102 HCHG RX REV CODE 250 W/ 637 OVERRIDE(OP): Performed by: ORTHOPAEDIC SURGERY

## 2018-12-23 PROCEDURE — 85027 COMPLETE CBC AUTOMATED: CPT

## 2018-12-23 PROCEDURE — 700111 HCHG RX REV CODE 636 W/ 250 OVERRIDE (IP): Performed by: ORTHOPAEDIC SURGERY

## 2018-12-23 PROCEDURE — 80069 RENAL FUNCTION PANEL: CPT

## 2018-12-23 RX ORDER — NYSTATIN 100000 [USP'U]/G
POWDER TOPICAL 2 TIMES DAILY
Status: DISCONTINUED | OUTPATIENT
Start: 2018-12-23 | End: 2018-12-26 | Stop reason: HOSPADM

## 2018-12-23 RX ADMIN — TIZANIDINE 2 MG: 4 TABLET ORAL at 05:39

## 2018-12-23 RX ADMIN — OXYCODONE HYDROCHLORIDE 10 MG: 10 TABLET ORAL at 19:05

## 2018-12-23 RX ADMIN — FUROSEMIDE 40 MG: 40 TABLET ORAL at 05:40

## 2018-12-23 RX ADMIN — OMEPRAZOLE 20 MG: 20 CAPSULE, DELAYED RELEASE ORAL at 06:00

## 2018-12-23 RX ADMIN — DOCUSATE SODIUM 100 MG: 100 CAPSULE, LIQUID FILLED ORAL at 05:36

## 2018-12-23 RX ADMIN — LOSARTAN POTASSIUM 50 MG: 50 TABLET ORAL at 05:36

## 2018-12-23 RX ADMIN — GABAPENTIN 300 MG: 300 CAPSULE ORAL at 05:40

## 2018-12-23 RX ADMIN — OXYCODONE HYDROCHLORIDE 10 MG: 10 TABLET ORAL at 10:42

## 2018-12-23 RX ADMIN — PRAVASTATIN SODIUM 40 MG: 10 TABLET ORAL at 20:32

## 2018-12-23 RX ADMIN — FERROUS SULFATE TAB 325 MG (65 MG ELEMENTAL FE) 325 MG: 325 (65 FE) TAB at 07:34

## 2018-12-23 RX ADMIN — ACETAMINOPHEN 650 MG: 325 TABLET, FILM COATED ORAL at 17:17

## 2018-12-23 RX ADMIN — GABAPENTIN 300 MG: 300 CAPSULE ORAL at 17:18

## 2018-12-23 RX ADMIN — TIZANIDINE 2 MG: 4 TABLET ORAL at 17:17

## 2018-12-23 RX ADMIN — OXYCODONE HYDROCHLORIDE 10 MG: 10 TABLET ORAL at 05:36

## 2018-12-23 RX ADMIN — ENOXAPARIN SODIUM 40 MG: 100 INJECTION SUBCUTANEOUS at 02:16

## 2018-12-23 RX ADMIN — STANDARDIZED SENNA CONCENTRATE AND DOCUSATE SODIUM 1 TABLET: 8.6; 5 TABLET, FILM COATED ORAL at 20:32

## 2018-12-23 RX ADMIN — OXYCODONE HYDROCHLORIDE 10 MG: 10 TABLET ORAL at 02:17

## 2018-12-23 RX ADMIN — HYDROMORPHONE HYDROCHLORIDE 0.5 MG: 1 INJECTION, SOLUTION INTRAMUSCULAR; INTRAVENOUS; SUBCUTANEOUS at 07:29

## 2018-12-23 RX ADMIN — NYSTATIN: 100000 POWDER TOPICAL at 17:18

## 2018-12-23 RX ADMIN — HYDROMORPHONE HYDROCHLORIDE 0.5 MG: 1 INJECTION, SOLUTION INTRAMUSCULAR; INTRAVENOUS; SUBCUTANEOUS at 20:31

## 2018-12-23 RX ADMIN — OXYCODONE HYDROCHLORIDE 10 MG: 10 TABLET ORAL at 15:05

## 2018-12-23 RX ADMIN — FUROSEMIDE 40 MG: 40 TABLET ORAL at 17:17

## 2018-12-23 ASSESSMENT — PAIN SCALES - GENERAL
PAINLEVEL_OUTOF10: 5
PAINLEVEL_OUTOF10: 8
PAINLEVEL_OUTOF10: 10
PAINLEVEL_OUTOF10: 8
PAINLEVEL_OUTOF10: 10
PAINLEVEL_OUTOF10: 5
PAINLEVEL_OUTOF10: 8
PAINLEVEL_OUTOF10: 6
PAINLEVEL_OUTOF10: 10
PAINLEVEL_OUTOF10: 8

## 2018-12-23 ASSESSMENT — ENCOUNTER SYMPTOMS
WEAKNESS: 1
FALLS: 1
SHORTNESS OF BREATH: 0
BLOOD IN STOOL: 0
DIARRHEA: 0
VOMITING: 0
CHILLS: 0
SPEECH CHANGE: 0
MYALGIAS: 1
FEVER: 0
COUGH: 0
SENSORY CHANGE: 0
ABDOMINAL PAIN: 0

## 2018-12-23 NOTE — PROGRESS NOTES
[facility] Medicine Daily Progress Note    Date of Service  12/23/2018    Chief Complaint  68 y.o. Female hx of DM II, Htn,  COPD, DVT completed Xarelto , OA, Breast Ca  10 years ago, left mastectomy and reconstruction and antihormonal therapy admitted 12/14/2018 with right hip and pelvic pain after fall.     Hospital Course      Post Transsacral screw fixation, right sacral fracture, S1 and S2 on 12-17.  Concern for pathological fx. Remains TTWB to right lower ext .     Interval Problem Update    Tearful this morning, reports pain uncontrolled in right leg.  Mobility limited by pain in both legs and TTWB status.  Updated by Sw - Choice made to local  Snfs- awaiting responses.     Consultants/Specialty  Dr. Porter, Orthopedics.   Palliative care.   Dr. Orellana    Code Status    DNR    Disposition    Anticipate SNF for rehab.     Review of Systems  Review of Systems   Constitutional: Negative for chills and fever.   Respiratory: Negative for cough and shortness of breath.    Cardiovascular: Negative for chest pain and leg swelling.   Gastrointestinal: Negative for abdominal pain, blood in stool, diarrhea, melena and vomiting.   Musculoskeletal: Positive for falls, joint pain and myalgias.        Chronic right shoulder pain back pain.  Bilateral lower extremity pain- right > left.    Neurological: Positive for weakness. Negative for sensory change and speech change.        Physical Exam  Temp:  [36.7 °C (98.1 °F)-37.4 °C (99.4 °F)] 37.2 °C (99 °F)  Pulse:  [77-98] 98  Resp:  [16-18] 18  BP: (110-152)/(46-78) 147/78    Physical Exam   Constitutional: She is oriented to person, place, and time. No distress.   Alert, approp oriented, NAD   HENT:   Head: Normocephalic and atraumatic.   Eyes: EOM are normal. Right eye exhibits no discharge. Left eye exhibits no discharge. No scleral icterus.   Neck: Neck supple. No JVD present.   Cardiovascular: Normal rate and regular rhythm.    No murmur heard.  Pulmonary/Chest: No  stridor. She has no wheezes. She has no rales.   Abdominal: Soft. Bowel sounds are normal. She exhibits no distension. There is no tenderness.   Musculoskeletal: She exhibits tenderness (right lateral hip w incision dressing  dry, clean.  Left ankle surgical scar present.  ). She exhibits no edema.   Neurological: She is alert and oriented to person, place, and time.   No gross focal weakness   Skin: Skin is warm and dry. She is not diaphoretic. No pallor.   Psychiatric: She has a normal mood and affect. Her behavior is normal.   Vitals reviewed.      Fluids    Intake/Output Summary (Last 24 hours) at 12/23/18 0815  Last data filed at 12/22/18 1800   Gross per 24 hour   Intake              660 ml   Output                0 ml   Net              660 ml       Laboratory  Recent Labs      12/21/18 0419 12/22/18   0433  12/23/18   0533   WBC  5.3  5.8  5.8   RBC  4.16*  4.23  4.46   HEMOGLOBIN  10.2*  10.6*  11.1*   HEMATOCRIT  34.2*  34.4*  39.0   MCV  82.2  81.3*  87.4   MCH  24.5*  25.1*  24.9*   MCHC  29.8*  30.8*  28.5*   RDW  49.4  48.0  54.2*   PLATELETCT  268  258  314   MPV  9.5  10.0  10.6     Recent Labs      12/21/18 0419 12/22/18   0433  12/23/18   0533   SODIUM  135  133*  129*   POTASSIUM  4.4  4.3  4.0   CHLORIDE  98  95*  94*   CO2  30  30  26   GLUCOSE  90  102*  102*   BUN  23*  19  16   CREATININE  0.71  0.67  0.67   CALCIUM  9.4  9.7  10.1                   Imaging  NM-BONE SCAN WHOLE BODY   Final Result      Right hemisacrum/SI joint increased uptake is compatible with known fracture and recent fracture fixation. Pathologic origin is possible      Mild increased right humeral head level uptake could be degenerative or malignant. Radiographs are advised      Mild left hindfoot uptake is likely degenerative, radiographs could clarify      DX-PELVIS-TRAUMA SERIES  3-   Final Result      Status post right sacral fracture fixation      DX-PORTABLE FLUOROSCOPY < 1 HOUR   Final Result          Portable fluoroscopy utilized for 1 minute 42 seconds.         DX-PELVIS-TRAUMA SERIES  3-   Final Result      Multiple fluoroscopic images were obtained intraoperatively during sacroiliac screw placement. Please see the patient's chart for full procedural details.      Fluoroscopy time 1 minute 42 seconds.   Fluoroscopy dose 47.8 mGy.      CT-PELVIS W/O PLUS RECONS   Final Result      1.  Right sacral fracture highly likely to be pathologic secondary to underlying malignancy      2.  Also, possible abnormal bone within the right ilium which could indicate malignancy.      3.  Osteoarthritis of both hip joint, both sacroiliac joints, lumbar spine facet joint.      4.  Prior hysterectomy      5.  Abdominal aortic atherosclerotic plaque      6.  Colonic diverticulosis      OUTSIDE IMAGES-US VASCULAR   Final Result      CT-FOREIGN FILM CAT SCAN   Final Result      OUTSIDE IMAGES-DX PELVIS   Final Result           Assessment/Plan  * Sacral fracture, closed (HCC)- (present on admission)   Assessment & Plan    post Transsacral screw fixation, right sacral fracture, S1 and S2 on 12-17.  Uncontrolled right hip pain.   Bone scan with findings of uptake in fracture site and over bone degenerative regions and joints.  Apparently no bone bx was done and confirmed with Path - no sample available.  Less likely metastatic dz- Discussed findings with patient .  States she will follow up with Dr. Casillas her oncologist in [place] who has been following her for her Breast Cancer following treatment.     Discussed with RN - will try to stay on top of pain meds with IV Dilaudid, Oxycodone, toradol and Zaniflex .   Ortho to follow and monitor response to treatment.   Choice to local SNFs made-- await responses.          Microcytic anemia   Assessment & Plan    Likely multi factorial , chronic dz, recent procedural , iron deficient. No symptoms GI or  bleed. Hgb stable.   Start iron supplements  Fu outpt for additional workup.      Elevated alkaline phosphatase level- (present on admission)   Assessment & Plan    Bone scan - non specific for metastatic dz- uptake noted in regions of fx and degenerative areas.  GGT elevation may also suggest elevated alkaline phosphatase due to hepatic source.  Of unclear etiology as her abdominal exam benign, no acute GI symptoms.  Outpatient follow-up recommended with oncology.      RLS (restless legs syndrome)- (present on admission)   Assessment & Plan    -Chronic     Diabetes mellitus type 2 in obese (HCC)- (present on admission)   Assessment & Plan    Fair control   cw  Metformin, serial accu checks, sliding scale insulin as needed.      History of breast cancer- (present on admission)   Assessment & Plan    Previously followed with oncologist Dr. Casillas in [facility], last saw them 1-2 years ago.  Ct suspicious for pathologic fx .  Bone scan abnormal uptake n regions of degenerative changes and chronic arthritis. No Bone bx performed on Hip by orhto.   To follow up with her oncologist and Ortho.  May need further evaluation with PET scan, can only be done outpatient.     COPD (chronic obstructive pulmonary disease) (HCC)- (present on admission)   Assessment & Plan    No flare up.      UTI (urinary tract infection)- (present on admission)   Assessment & Plan    Had  symptoms of polyuria. Urine culture NGTD.  Antibiotics stopped.      GERD (gastroesophageal reflux disease)- (present on admission)   Assessment & Plan    cw prilosec     Chronic pain- (present on admission)   Assessment & Plan    cw  gabapentin and tizanidine  Norco as needed  Palliative care input.           VTE prophylaxis: Lovenox.

## 2018-12-23 NOTE — DISCHARGE PLANNING
Agency/Facility Name: [facility]  Spoke To: Fax Transmission  Outcome: Patient accepted.

## 2018-12-24 PROBLEM — E87.1 HYPONATREMIA: Status: ACTIVE | Noted: 2018-12-24

## 2018-12-24 PROBLEM — B37.2 SKIN YEAST INFECTION: Status: ACTIVE | Noted: 2018-12-24

## 2018-12-24 LAB
ALBUMIN SERPL BCP-MCNC: 3.5 G/DL (ref 3.2–4.9)
BUN SERPL-MCNC: 19 MG/DL (ref 8–22)
CALCIUM SERPL-MCNC: 9.7 MG/DL (ref 8.5–10.5)
CHLORIDE SERPL-SCNC: 94 MMOL/L (ref 96–112)
CO2 SERPL-SCNC: 29 MMOL/L (ref 20–33)
CREAT SERPL-MCNC: 0.76 MG/DL (ref 0.5–1.4)
ERYTHROCYTE [DISTWIDTH] IN BLOOD BY AUTOMATED COUNT: 49 FL (ref 35.9–50)
GLUCOSE BLD-MCNC: 87 MG/DL (ref 65–99)
GLUCOSE BLD-MCNC: 89 MG/DL (ref 65–99)
GLUCOSE BLD-MCNC: 91 MG/DL (ref 65–99)
GLUCOSE SERPL-MCNC: 99 MG/DL (ref 65–99)
HCT VFR BLD AUTO: 35 % (ref 37–47)
HGB BLD-MCNC: 10.5 G/DL (ref 12–16)
MCH RBC QN AUTO: 24.5 PG (ref 27–33)
MCHC RBC AUTO-ENTMCNC: 30 G/DL (ref 33.6–35)
MCV RBC AUTO: 81.6 FL (ref 81.4–97.8)
OSMOLALITY SERPL: 276 MOSM/KG H2O (ref 278–298)
PHOSPHATE SERPL-MCNC: 3.8 MG/DL (ref 2.5–4.5)
PLATELET # BLD AUTO: 267 K/UL (ref 164–446)
PMV BLD AUTO: 10.3 FL (ref 9–12.9)
POTASSIUM SERPL-SCNC: 4.3 MMOL/L (ref 3.6–5.5)
RBC # BLD AUTO: 4.29 M/UL (ref 4.2–5.4)
SODIUM SERPL-SCNC: 129 MMOL/L (ref 135–145)
WBC # BLD AUTO: 6.3 K/UL (ref 4.8–10.8)

## 2018-12-24 PROCEDURE — 36415 COLL VENOUS BLD VENIPUNCTURE: CPT

## 2018-12-24 PROCEDURE — 99232 SBSQ HOSP IP/OBS MODERATE 35: CPT | Performed by: HOSPITALIST

## 2018-12-24 PROCEDURE — A9270 NON-COVERED ITEM OR SERVICE: HCPCS | Performed by: ORTHOPAEDIC SURGERY

## 2018-12-24 PROCEDURE — 85027 COMPLETE CBC AUTOMATED: CPT

## 2018-12-24 PROCEDURE — 700111 HCHG RX REV CODE 636 W/ 250 OVERRIDE (IP): Performed by: ORTHOPAEDIC SURGERY

## 2018-12-24 PROCEDURE — 99233 SBSQ HOSP IP/OBS HIGH 50: CPT | Performed by: INTERNAL MEDICINE

## 2018-12-24 PROCEDURE — 700102 HCHG RX REV CODE 250 W/ 637 OVERRIDE(OP): Performed by: ORTHOPAEDIC SURGERY

## 2018-12-24 PROCEDURE — A9270 NON-COVERED ITEM OR SERVICE: HCPCS | Performed by: INTERNAL MEDICINE

## 2018-12-24 PROCEDURE — 770006 HCHG ROOM/CARE - MED/SURG/GYN SEMI*

## 2018-12-24 PROCEDURE — 97530 THERAPEUTIC ACTIVITIES: CPT

## 2018-12-24 PROCEDURE — 83930 ASSAY OF BLOOD OSMOLALITY: CPT

## 2018-12-24 PROCEDURE — A9270 NON-COVERED ITEM OR SERVICE: HCPCS | Performed by: HOSPITALIST

## 2018-12-24 PROCEDURE — 700102 HCHG RX REV CODE 250 W/ 637 OVERRIDE(OP): Performed by: HOSPITALIST

## 2018-12-24 PROCEDURE — 80069 RENAL FUNCTION PANEL: CPT

## 2018-12-24 PROCEDURE — 700112 HCHG RX REV CODE 229: Performed by: ORTHOPAEDIC SURGERY

## 2018-12-24 PROCEDURE — 82962 GLUCOSE BLOOD TEST: CPT | Mod: 91

## 2018-12-24 PROCEDURE — 700102 HCHG RX REV CODE 250 W/ 637 OVERRIDE(OP): Performed by: INTERNAL MEDICINE

## 2018-12-24 PROCEDURE — 97535 SELF CARE MNGMENT TRAINING: CPT

## 2018-12-24 RX ORDER — ACETAMINOPHEN 325 MG/1
650 TABLET ORAL 3 TIMES DAILY
Status: DISCONTINUED | OUTPATIENT
Start: 2018-12-24 | End: 2018-12-26 | Stop reason: HOSPADM

## 2018-12-24 RX ORDER — PRAMIPEXOLE DIHYDROCHLORIDE 0.25 MG/1
0.25 TABLET ORAL 3 TIMES DAILY
Status: DISCONTINUED | OUTPATIENT
Start: 2018-12-24 | End: 2018-12-26 | Stop reason: HOSPADM

## 2018-12-24 RX ORDER — SODIUM CHLORIDE 1 G/1
1 TABLET ORAL
Status: DISCONTINUED | OUTPATIENT
Start: 2018-12-24 | End: 2018-12-26 | Stop reason: HOSPADM

## 2018-12-24 RX ORDER — TRAMADOL HYDROCHLORIDE 50 MG/1
50 TABLET ORAL 3 TIMES DAILY
Status: DISCONTINUED | OUTPATIENT
Start: 2018-12-24 | End: 2018-12-26 | Stop reason: HOSPADM

## 2018-12-24 RX ORDER — GABAPENTIN 300 MG/1
300 CAPSULE ORAL 3 TIMES DAILY
Status: DISCONTINUED | OUTPATIENT
Start: 2018-12-24 | End: 2018-12-26 | Stop reason: HOSPADM

## 2018-12-24 RX ADMIN — FERROUS SULFATE TAB 325 MG (65 MG ELEMENTAL FE) 325 MG: 325 (65 FE) TAB at 07:20

## 2018-12-24 RX ADMIN — NYSTATIN: 100000 POWDER TOPICAL at 05:18

## 2018-12-24 RX ADMIN — OXYCODONE HYDROCHLORIDE 10 MG: 10 TABLET ORAL at 02:14

## 2018-12-24 RX ADMIN — LOSARTAN POTASSIUM 50 MG: 50 TABLET ORAL at 05:23

## 2018-12-24 RX ADMIN — ENOXAPARIN SODIUM 40 MG: 100 INJECTION SUBCUTANEOUS at 01:44

## 2018-12-24 RX ADMIN — OXYCODONE HYDROCHLORIDE 10 MG: 10 TABLET ORAL at 05:17

## 2018-12-24 RX ADMIN — OXYCODONE HYDROCHLORIDE 5 MG: 5 TABLET ORAL at 12:39

## 2018-12-24 RX ADMIN — PRAVASTATIN SODIUM 40 MG: 10 TABLET ORAL at 21:33

## 2018-12-24 RX ADMIN — SODIUM CHLORIDE TAB 1 GM 1 G: 1 TAB at 16:43

## 2018-12-24 RX ADMIN — STANDARDIZED SENNA CONCENTRATE AND DOCUSATE SODIUM 1 TABLET: 8.6; 5 TABLET, FILM COATED ORAL at 21:34

## 2018-12-24 RX ADMIN — NYSTATIN: 100000 POWDER TOPICAL at 16:45

## 2018-12-24 RX ADMIN — ACETAMINOPHEN 650 MG: 325 TABLET, FILM COATED ORAL at 16:43

## 2018-12-24 RX ADMIN — GABAPENTIN 300 MG: 300 CAPSULE ORAL at 05:17

## 2018-12-24 RX ADMIN — FUROSEMIDE 40 MG: 40 TABLET ORAL at 16:43

## 2018-12-24 RX ADMIN — FUROSEMIDE 40 MG: 40 TABLET ORAL at 05:18

## 2018-12-24 RX ADMIN — DOCUSATE SODIUM 100 MG: 100 CAPSULE, LIQUID FILLED ORAL at 05:17

## 2018-12-24 RX ADMIN — ACETAMINOPHEN 650 MG: 325 TABLET, FILM COATED ORAL at 09:23

## 2018-12-24 RX ADMIN — TRAMADOL HYDROCHLORIDE 50 MG: 50 TABLET, FILM COATED ORAL at 11:07

## 2018-12-24 RX ADMIN — DOCUSATE SODIUM 100 MG: 100 CAPSULE, LIQUID FILLED ORAL at 16:43

## 2018-12-24 RX ADMIN — PRAMIPEXOLE DIHYDROCHLORIDE 0.25 MG: 0.25 TABLET ORAL at 12:04

## 2018-12-24 RX ADMIN — HYDROMORPHONE HYDROCHLORIDE 0.5 MG: 1 INJECTION, SOLUTION INTRAMUSCULAR; INTRAVENOUS; SUBCUTANEOUS at 14:50

## 2018-12-24 RX ADMIN — GABAPENTIN 300 MG: 300 CAPSULE ORAL at 16:43

## 2018-12-24 RX ADMIN — TRAMADOL HYDROCHLORIDE 50 MG: 50 TABLET, FILM COATED ORAL at 16:43

## 2018-12-24 RX ADMIN — OMEPRAZOLE 20 MG: 20 CAPSULE, DELAYED RELEASE ORAL at 05:17

## 2018-12-24 RX ADMIN — GABAPENTIN 300 MG: 300 CAPSULE ORAL at 11:08

## 2018-12-24 RX ADMIN — SODIUM CHLORIDE TAB 1 GM 1 G: 1 TAB at 11:07

## 2018-12-24 RX ADMIN — PRAMIPEXOLE DIHYDROCHLORIDE 0.25 MG: 0.25 TABLET ORAL at 16:43

## 2018-12-24 RX ADMIN — TIZANIDINE 2 MG: 4 TABLET ORAL at 05:17

## 2018-12-24 ASSESSMENT — COGNITIVE AND FUNCTIONAL STATUS - GENERAL
HELP NEEDED FOR BATHING: A LITTLE
DRESSING REGULAR LOWER BODY CLOTHING: A LOT
SUGGESTED CMS G CODE MODIFIER DAILY ACTIVITY: CJ
TOILETING: A LITTLE
DAILY ACTIVITIY SCORE: 20

## 2018-12-24 ASSESSMENT — ENCOUNTER SYMPTOMS
MYALGIAS: 1
SHORTNESS OF BREATH: 0
BLOOD IN STOOL: 0
VOMITING: 0
COUGH: 0
SENSORY CHANGE: 0
SPEECH CHANGE: 0
WEAKNESS: 1
DIARRHEA: 0
FALLS: 1
ABDOMINAL PAIN: 0
CHILLS: 0
FEVER: 0

## 2018-12-24 ASSESSMENT — PAIN SCALES - GENERAL
PAINLEVEL_OUTOF10: 5
PAINLEVEL_OUTOF10: 6
PAINLEVEL_OUTOF10: 5
PAINLEVEL_OUTOF10: 5
PAINLEVEL_OUTOF10: 10
PAINLEVEL_OUTOF10: 5
PAINLEVEL_OUTOF10: 8

## 2018-12-24 NOTE — PROGRESS NOTES
Palliative Progress Note               Author: Lilian Orellana Date & Time created: 2018  9:27 AM     Reason for subsequent visit:  Right hip pain    Interval History:  She reports her pain at 6/10 sitting in a chair. She states her right hip pain began to escalate once the antiinflammatories were stopped. The opioids help a bit but mostly make her sleepy. She also reports that she takes Mirapex 0.5 mg TID at home. Last BM 18.    Review of Systems:  Positive for pain. Positive for sedation (a bit sleepy but oriented and able to answer questions.). Positive for insomnia (DT restless legs and being in the hospital). Negative for nausea and vomiting. Positive for constipation.        Physical Exam:  Recent vital signs  Temp (24hrs), Av.8 °C (98.2 °F), Min:35.9 °C (96.7 °F), Max:37.5 °C (99.5 °F)  Temperature: 36.8 °C (98.3 °F)  Pulse  Av.9  Min: 59  Max: 116   Blood Pressure : 109/51       Physical Exam   Constitutional: She is oriented to person, place, and time. She appears well-developed and well-nourished.   Eyes: Pupils are equal, round, and reactive to light.   Cardiovascular: Normal rate and regular rhythm.    Abdominal: Soft. Bowel sounds are normal.   Neurological: She is oriented to person, place, and time.     Recent Labs      18   0433  18   0533  18   0421   SODIUM  133*  129*  129*   POTASSIUM  4.3  4.0  4.3   CHLORIDE  95*  94*  94*   CO2  30  26  29   GLUCOSE  102*  102*  99   BUN  19  16  19   CREATININE  0.67  0.67  0.76   CALCIUM  9.7  10.1  9.7     Recent Labs      18   0433  18   0533  18   0421   WBC  5.8  5.8  6.3   RBC  4.23  4.46  4.29   HEMOGLOBIN  10.6*  11.1*  10.5*   HEMATOCRIT  34.4*  39.0  35.0*   MCV  81.3*  87.4  81.6   MCH  25.1*  24.9*  24.5*   MCHC  30.8*  28.5*  30.0*   RDW  48.0  54.2*  49.0   PLATELETCT  258  314  267   MPV  10.0  10.6  10.3     Problem List:  1. Sacral fracture with possible bony metastasis (active)  2.  Severe right hip pain (stable)  3. History of left breast cancer status post mastectomy, reconstruction surgery and hormonal therapy (chronic)  4. History of HTN (chronic)  5. History of diabetes (chronic)  6. History of chronic pain (chronic)  7. Hyperlipidemia (chronic)  8. Opioid-induced constipation (resolved) - last bowel movement this morning  9. Restless leg syndrome    Assessment/Plan:  Sacral fracture with possible bony metastasis  There is no clear evidence of metastases, however since ketorolac and dexamethasone have been stopped her pain has increased considerable. She is POD #8 for sacral fracture fixation. She should not be having so much post op pain.    Severe right hip pain:  She is taking approximately 50 mg oxycodone in 24 h with occasional IV Dilaudid 0.5 mg. Her pain remains a 6/10 even sitting in a chair.  1. DC oxy IR 10 mg and only offer 5 mg q 3 h PRN  2. Increase gabapentin to 300 mg q 8 h  3. Add  Tylenol 650 mg po q 8 h  4. Add tramadol 50 mg po q 8 h  5. If this does not help pain consider stopping Tylenol and adding ibuprofen.    Restless Leg Syndrome:  1. Patient takes Mirapex 0.5 mg q 8 h, however I will start at 0.25 mg po q 8 h  2. DC Zanaflex     Code Status:DNR/DNI    Advance Care Planning (ACP)/Goals of Care (GOC):Rehab so that she can get back home    <PALLIATIVEREVIEW>    Total visit time was 38.  Greater than 50% of today's visit was spent counseling and coordinating the patient's care regarding Pain control.   Please refer to interval history assessment/plan for details.

## 2018-12-24 NOTE — PROGRESS NOTES
Received updated bedside shift report from Delbert NGUYEN at 1900.     Pt sitting up in bed at this time, a&o x4. Dressing in place to right hip CDI. Dressing to left hip CDI.     Call light and belongings within reach, bed down and locked, hourly rounding in progress.

## 2018-12-24 NOTE — PROGRESS NOTES
Orthopaedic PA Progress Note    Interval changes: Palliative note read and appreciated. Pain and TTP R hip. OOB in chair    ROS - Patient denies any new issues. No chest pain, dyspnea, or fever.  Pain well controlled.    Blood pressure 113/91, pulse 98, temperature 36.8 °C (98.3 °F), temperature source Temporal, resp. rate 15, height 1.524 m (5'), weight 77.8 kg (171 lb 8.3 oz), SpO2 95 %, not currently breastfeeding.    Patient seen and examined  No acute distress  Breathing non labored  RRR  Surgical dressing is clean, dry, and intact. Patient clearly fires tibialis anterior, EHL, and gastrocnemius/soleus. Sensation is intact to light touch throughout superficial peroneal, deep peroneal, tibial, saphenous, and sural nerve distributions. Strong and palpable 2+ dorsalis pedis and posterior tibial pulses with capillary refill less than 2 seconds. No lower leg tenderness or discomfort.    Recent Labs      12/22/18   0433  12/23/18   0533  12/24/18   0421   WBC  5.8  5.8  6.3   RBC  4.23  4.46  4.29   HEMOGLOBIN  10.6*  11.1*  10.5*   HEMATOCRIT  34.4*  39.0  35.0*   MCV  81.3*  87.4  81.6   MCH  25.1*  24.9*  24.5*   MCHC  30.8*  28.5*  30.0*   RDW  48.0  54.2*  49.0   PLATELETCT  258  314  267   MPV  10.0  10.6  10.3       Active Hospital Problems    Diagnosis   • Sacral fracture, closed ([facility]) [S32.10XA]     Priority: High   • Hyponatremia [E87.1]     Priority: Medium   • Microcytic anemia [D50.9]     Priority: Medium   • Elevated alkaline phosphatase level [R74.8]     Priority: Medium   • UTI (urinary tract infection) [N39.0]     Priority: Medium   • COPD (chronic obstructive pulmonary disease) ([facility]) [J44.9]     Priority: Medium   • History of breast cancer [Z85.3]     Priority: Medium   • Diabetes mellitus type 2 in obese ([facility]) [E11.69, E66.9]     Priority: Medium   • RLS (restless legs syndrome) [G25.81]     Priority: Medium   • Chronic pain [G89.29]     Priority: Low   • GERD (gastroesophageal reflux disease)  [K21.9]     Priority: Low   • Skin yeast infection [B37.2]     Trial nystatin in groins.        Assessment/Plan:  POD#7 S/P Transsacral screw fixation, right sacral fracture, S1 and S2.  This was a minimally invasive percutaneous technique utilizing cannulated screw fixation, to my knowledge there were no reamings to be sent to path, communicated same to Nursing/Med teams  Wt bearing status - TTWB RLE  Wound care/Drains - dressings changed every other day by nursing starting POD#2  Future Procedures - none planned  Lovenox: Start 12/18, Duration-until ambulatory > 150'  Sutures/Staples out- 10-14 days post operatively  PT/OT-initiated  Antibiotics: completed  DVT Prophylaxis- TEDS/SCDs/Foot pumps  Sue-none  Case Coordination for Discharge Planning - Disposition per Med

## 2018-12-24 NOTE — PROGRESS NOTES
[facility] Medicine Daily Progress Note    Date of Service  12/24/2018    Chief Complaint  68 y.o. Female hx of DM II, Htn,  COPD, DVT completed Xarelto , OA, Breast Ca  10 years ago, left mastectomy and reconstruction and antihormonal therapy admitted 12/14/2018 with right hip and pelvic pain after fall.     Hospital Course      Post Transsacral screw fixation, right sacral fracture, S1 and S2 on 12-17.  Concern for pathological fx. Remains TTWB to right lower ext .     Interval Problem Update    Pain better controlled.  Improved function - mobilized to bathroom and showered  with walker  assist. Reports rash in both groins.     Consultants/Specialty  Dr. Porter, Orthopedics.   Palliative care.   Dr. Orellana    Code Status    DNR    Disposition    Anticipate SNF for rehab.     Review of Systems  Review of Systems   Constitutional: Negative for chills and fever.   Respiratory: Negative for cough and shortness of breath.    Cardiovascular: Negative for chest pain and leg swelling.   Gastrointestinal: Negative for abdominal pain, blood in stool, diarrhea, melena and vomiting.   Musculoskeletal: Positive for falls, joint pain and myalgias.        Chronic right shoulder and lower back pain.  Bilateral lower extremity pain- right > left.    Neurological: Positive for weakness. Negative for sensory change and speech change.        Physical Exam  Temp:  [35.9 °C (96.7 °F)-37.5 °C (99.5 °F)] 36.8 °C (98.3 °F)  Pulse:  [87-95] 95  Resp:  [16-18] 16  BP: (100-127)/(56-76) 124/76    Physical Exam   Constitutional: She is oriented to person, place, and time. No distress.   HENT:   Head: Normocephalic and atraumatic.   Eyes: EOM are normal. Right eye exhibits no discharge. Left eye exhibits no discharge. No scleral icterus.   Neck: Neck supple.   Cardiovascular: Normal rate and regular rhythm.    No murmur heard.  Pulmonary/Chest: No stridor. She has no wheezes. She has no rales.   Abdominal: Soft. Bowel sounds are normal. She  exhibits no distension. There is no tenderness.   Musculoskeletal: She exhibits edema (mild right hip - no ecchymosis . ) and tenderness (right lateral hip w incision dressing  dry, clean.  Left ankle surgical scar present.  ).   Neurological: She is alert and oriented to person, place, and time.   No gross focal weakness   Skin: Skin is warm and dry. Rash (mild erythema in groin region, creases. ) noted. She is not diaphoretic. No pallor.   Psychiatric: She has a normal mood and affect. Her behavior is normal.   Vitals reviewed.      Fluids    Intake/Output Summary (Last 24 hours) at 12/24/18 0823  Last data filed at 12/23/18 1700   Gross per 24 hour   Intake              720 ml   Output                0 ml   Net              720 ml       Laboratory  Recent Labs      12/22/18 0433 12/23/18   0533  12/24/18   0421   WBC  5.8  5.8  6.3   RBC  4.23  4.46  4.29   HEMOGLOBIN  10.6*  11.1*  10.5*   HEMATOCRIT  34.4*  39.0  35.0*   MCV  81.3*  87.4  81.6   MCH  25.1*  24.9*  24.5*   MCHC  30.8*  28.5*  30.0*   RDW  48.0  54.2*  49.0   PLATELETCT  258  314  267   MPV  10.0  10.6  10.3     Recent Labs      12/22/18 0433 12/23/18   0533  12/24/18   0421   SODIUM  133*  129*  129*   POTASSIUM  4.3  4.0  4.3   CHLORIDE  95*  94*  94*   CO2  30  26  29   GLUCOSE  102*  102*  99   BUN  19  16  19   CREATININE  0.67  0.67  0.76   CALCIUM  9.7  10.1  9.7                   Imaging  NM-BONE SCAN WHOLE BODY   Final Result      Right hemisacrum/SI joint increased uptake is compatible with known fracture and recent fracture fixation. Pathologic origin is possible      Mild increased right humeral head level uptake could be degenerative or malignant. Radiographs are advised      Mild left hindfoot uptake is likely degenerative, radiographs could clarify      DX-PELVIS-TRAUMA SERIES  3-   Final Result      Status post right sacral fracture fixation      DX-PORTABLE FLUOROSCOPY < 1 HOUR   Final Result         Portable fluoroscopy  utilized for 1 minute 42 seconds.         DX-PELVIS-TRAUMA SERIES  3-   Final Result      Multiple fluoroscopic images were obtained intraoperatively during sacroiliac screw placement. Please see the patient's chart for full procedural details.      Fluoroscopy time 1 minute 42 seconds.   Fluoroscopy dose 47.8 mGy.      CT-PELVIS W/O PLUS RECONS   Final Result      1.  Right sacral fracture highly likely to be pathologic secondary to underlying malignancy      2.  Also, possible abnormal bone within the right ilium which could indicate malignancy.      3.  Osteoarthritis of both hip joint, both sacroiliac joints, lumbar spine facet joint.      4.  Prior hysterectomy      5.  Abdominal aortic atherosclerotic plaque      6.  Colonic diverticulosis      OUTSIDE IMAGES-US VASCULAR   Final Result      CT-FOREIGN FILM CAT SCAN   Final Result      OUTSIDE IMAGES-DX PELVIS   Final Result           Assessment/Plan  * Sacral fracture, closed (HCC)- (present on admission)   Assessment & Plan    post Transsacral screw fixation, right sacral fracture, S1 and S2 on 12-17.  Uncontrolled right hip pain.   Bone scan with findings of uptake in fracture site and over bone degenerative regions and joints.  Apparently no bone bx was done and confirmed with Path - no sample available.  Less likely metastatic dz- Discussed findings with patient .  States she will follow up with Dr. Casillas her oncologist in [place], [place] who has been following her for her Breast Cancer following treatment.     Improved pain - continue  IV Dilaudid, Oxycodone, toradol and Zaniflex .   Ortho input.   Referrals to SNF made, await response.          Hyponatremia   Assessment & Plan    Trial Nacl tablets  Fu Na.  Check serum osmo.  Consider free water fluid restrict if hypo osmolar.      Microcytic anemia   Assessment & Plan    Likely multi factorial , chronic dz, recent procedural , iron deficient. No symptoms GI or  bleed. Hgb stable.   Start iron  supplements  Fu outpt for additional workup.     Elevated alkaline phosphatase level- (present on admission)   Assessment & Plan    Bone scan - non specific for metastatic dz- uptake noted in regions of fx and degenerative areas.  GGT elevation may also suggest elevated alkaline phosphatase due to hepatic source.  Of unclear etiology as her abdominal exam benign, no acute GI symptoms.  Outpatient follow-up recommended with oncology.      RLS (restless legs syndrome)- (present on admission)   Assessment & Plan    -Chronic     Diabetes mellitus type 2 in obese (HCC)- (present on admission)   Assessment & Plan    Fair control   cw  Metformin, serial accu checks, sliding scale insulin as needed.      History of breast cancer- (present on admission)   Assessment & Plan    Previously followed with oncologist Dr. Casillas in [facility], last saw them 1-2 years ago.  Ct suspicious for pathologic fx .  Bone scan abnormal uptake n regions of degenerative changes and chronic arthritis. No Bone bx performed on Hip by orhto.   To follow up with her oncologist and Ortho.  May need further evaluation with PET scan, can only be done outpatient.     COPD (chronic obstructive pulmonary disease) (HCC)- (present on admission)   Assessment & Plan    No flare up.      UTI (urinary tract infection)- (present on admission)   Assessment & Plan    Had  symptoms of polyuria. Urine culture NGTD.  Antibiotics stopped.      Skin yeast infection   Assessment & Plan    Nystatin in groins, try to keep dry.     GERD (gastroesophageal reflux disease)- (present on admission)   Assessment & Plan    cw prilosec     Chronic pain- (present on admission)   Assessment & Plan    cw  gabapentin and tizanidine  Norco as needed  Palliative care input.           VTE prophylaxis: Lovenox.

## 2018-12-25 LAB
ANION GAP SERPL CALC-SCNC: 10 MMOL/L (ref 0–11.9)
BUN SERPL-MCNC: 18 MG/DL (ref 8–22)
CALCIUM SERPL-MCNC: 10 MG/DL (ref 8.5–10.5)
CHLORIDE SERPL-SCNC: 96 MMOL/L (ref 96–112)
CO2 SERPL-SCNC: 27 MMOL/L (ref 20–33)
CREAT SERPL-MCNC: 0.67 MG/DL (ref 0.5–1.4)
GLUCOSE SERPL-MCNC: 119 MG/DL (ref 65–99)
POTASSIUM SERPL-SCNC: 4.3 MMOL/L (ref 3.6–5.5)
SODIUM SERPL-SCNC: 133 MMOL/L (ref 135–145)

## 2018-12-25 PROCEDURE — 700102 HCHG RX REV CODE 250 W/ 637 OVERRIDE(OP): Performed by: HOSPITALIST

## 2018-12-25 PROCEDURE — 80048 BASIC METABOLIC PNL TOTAL CA: CPT

## 2018-12-25 PROCEDURE — A9270 NON-COVERED ITEM OR SERVICE: HCPCS | Performed by: ORTHOPAEDIC SURGERY

## 2018-12-25 PROCEDURE — 99232 SBSQ HOSP IP/OBS MODERATE 35: CPT | Performed by: HOSPITALIST

## 2018-12-25 PROCEDURE — A9270 NON-COVERED ITEM OR SERVICE: HCPCS | Performed by: HOSPITALIST

## 2018-12-25 PROCEDURE — 36415 COLL VENOUS BLD VENIPUNCTURE: CPT

## 2018-12-25 PROCEDURE — 770006 HCHG ROOM/CARE - MED/SURG/GYN SEMI*

## 2018-12-25 PROCEDURE — 700102 HCHG RX REV CODE 250 W/ 637 OVERRIDE(OP): Performed by: INTERNAL MEDICINE

## 2018-12-25 PROCEDURE — 700102 HCHG RX REV CODE 250 W/ 637 OVERRIDE(OP): Performed by: ORTHOPAEDIC SURGERY

## 2018-12-25 PROCEDURE — 700111 HCHG RX REV CODE 636 W/ 250 OVERRIDE (IP): Performed by: ORTHOPAEDIC SURGERY

## 2018-12-25 PROCEDURE — A9270 NON-COVERED ITEM OR SERVICE: HCPCS | Performed by: INTERNAL MEDICINE

## 2018-12-25 PROCEDURE — 700112 HCHG RX REV CODE 229: Performed by: ORTHOPAEDIC SURGERY

## 2018-12-25 RX ADMIN — ACETAMINOPHEN 650 MG: 325 TABLET, FILM COATED ORAL at 17:18

## 2018-12-25 RX ADMIN — PRAMIPEXOLE DIHYDROCHLORIDE 0.25 MG: 0.25 TABLET ORAL at 12:00

## 2018-12-25 RX ADMIN — ENOXAPARIN SODIUM 40 MG: 100 INJECTION SUBCUTANEOUS at 05:02

## 2018-12-25 RX ADMIN — FERROUS SULFATE TAB 325 MG (65 MG ELEMENTAL FE) 325 MG: 325 (65 FE) TAB at 08:21

## 2018-12-25 RX ADMIN — TRAMADOL HYDROCHLORIDE 50 MG: 50 TABLET, FILM COATED ORAL at 05:02

## 2018-12-25 RX ADMIN — NYSTATIN: 100000 POWDER TOPICAL at 18:00

## 2018-12-25 RX ADMIN — SODIUM CHLORIDE TAB 1 GM 1 G: 1 TAB at 08:21

## 2018-12-25 RX ADMIN — DOCUSATE SODIUM 100 MG: 100 CAPSULE, LIQUID FILLED ORAL at 05:02

## 2018-12-25 RX ADMIN — PRAMIPEXOLE DIHYDROCHLORIDE 0.25 MG: 0.25 TABLET ORAL at 17:18

## 2018-12-25 RX ADMIN — FUROSEMIDE 40 MG: 40 TABLET ORAL at 17:18

## 2018-12-25 RX ADMIN — ACETAMINOPHEN 650 MG: 325 TABLET, FILM COATED ORAL at 13:45

## 2018-12-25 RX ADMIN — FUROSEMIDE 40 MG: 40 TABLET ORAL at 05:02

## 2018-12-25 RX ADMIN — PRAVASTATIN SODIUM 40 MG: 10 TABLET ORAL at 20:44

## 2018-12-25 RX ADMIN — GABAPENTIN 300 MG: 300 CAPSULE ORAL at 13:45

## 2018-12-25 RX ADMIN — OXYCODONE HYDROCHLORIDE 5 MG: 5 TABLET ORAL at 08:21

## 2018-12-25 RX ADMIN — PRAMIPEXOLE DIHYDROCHLORIDE 0.25 MG: 0.25 TABLET ORAL at 05:03

## 2018-12-25 RX ADMIN — NYSTATIN: 100000 POWDER TOPICAL at 05:12

## 2018-12-25 RX ADMIN — TRAMADOL HYDROCHLORIDE 50 MG: 50 TABLET, FILM COATED ORAL at 13:45

## 2018-12-25 RX ADMIN — OMEPRAZOLE 20 MG: 20 CAPSULE, DELAYED RELEASE ORAL at 05:02

## 2018-12-25 RX ADMIN — OXYCODONE HYDROCHLORIDE 5 MG: 5 TABLET ORAL at 20:44

## 2018-12-25 RX ADMIN — STANDARDIZED SENNA CONCENTRATE AND DOCUSATE SODIUM 1 TABLET: 8.6; 5 TABLET, FILM COATED ORAL at 20:44

## 2018-12-25 RX ADMIN — SODIUM CHLORIDE TAB 1 GM 1 G: 1 TAB at 11:30

## 2018-12-25 RX ADMIN — ACETAMINOPHEN 650 MG: 325 TABLET, FILM COATED ORAL at 05:02

## 2018-12-25 RX ADMIN — DOCUSATE SODIUM 100 MG: 100 CAPSULE, LIQUID FILLED ORAL at 17:18

## 2018-12-25 RX ADMIN — SODIUM CHLORIDE TAB 1 GM 1 G: 1 TAB at 17:18

## 2018-12-25 RX ADMIN — GABAPENTIN 300 MG: 300 CAPSULE ORAL at 05:02

## 2018-12-25 RX ADMIN — GABAPENTIN 300 MG: 300 CAPSULE ORAL at 17:18

## 2018-12-25 RX ADMIN — TRAMADOL HYDROCHLORIDE 50 MG: 50 TABLET, FILM COATED ORAL at 17:18

## 2018-12-25 RX ADMIN — OXYCODONE HYDROCHLORIDE 5 MG: 5 TABLET ORAL at 02:26

## 2018-12-25 RX ADMIN — LOSARTAN POTASSIUM 50 MG: 50 TABLET ORAL at 05:02

## 2018-12-25 ASSESSMENT — ENCOUNTER SYMPTOMS
COUGH: 0
SPEECH CHANGE: 0
SENSORY CHANGE: 0
ABDOMINAL PAIN: 0
SORE THROAT: 0
FLANK PAIN: 0
STRIDOR: 0
CHILLS: 0
VOMITING: 0
FALLS: 1
BLOOD IN STOOL: 0
SHORTNESS OF BREATH: 0
BRUISES/BLEEDS EASILY: 0
EYE DISCHARGE: 0
EYE PAIN: 0
EYE REDNESS: 0
WEAKNESS: 1
MYALGIAS: 1
DIARRHEA: 0
SINUS PAIN: 0
NERVOUS/ANXIOUS: 0
HALLUCINATIONS: 0
FEVER: 0

## 2018-12-25 ASSESSMENT — PATIENT HEALTH QUESTIONNAIRE - PHQ9
1. LITTLE INTEREST OR PLEASURE IN DOING THINGS: NOT AT ALL
2. FEELING DOWN, DEPRESSED, IRRITABLE, OR HOPELESS: NOT AT ALL
SUM OF ALL RESPONSES TO PHQ9 QUESTIONS 1 AND 2: 0

## 2018-12-25 ASSESSMENT — PAIN SCALES - GENERAL
PAINLEVEL_OUTOF10: 8
PAINLEVEL_OUTOF10: 10
PAINLEVEL_OUTOF10: 5
PAINLEVEL_OUTOF10: 7
PAINLEVEL_OUTOF10: 8
PAINLEVEL_OUTOF10: 6

## 2018-12-25 NOTE — PROGRESS NOTES
Orthopaedic PA Progress Note    Interval changes:Doing well, sleeping comfortably, easily awakened, less pain today.     ROS - Patient denies any new issues. No chest pain, dyspnea, or fever.  Pain well controlled.    Blood pressure 129/69, pulse 93, temperature 36.6 °C (97.8 °F), temperature source Temporal, resp. rate 17, height 1.524 m (5'), weight 77.8 kg (171 lb 8.3 oz), SpO2 90 %, not currently breastfeeding.    Patient seen and examined  No acute distress  Breathing non labored  RRR  Surgical dressing is clean, dry, and intact. Patient clearly fires tibialis anterior, EHL, and gastrocnemius/soleus. Sensation is intact to light touch throughout superficial peroneal, deep peroneal, tibial, saphenous, and sural nerve distributions. Strong and palpable 2+ dorsalis pedis and posterior tibial pulses with capillary refill less than 2 seconds. No lower leg tenderness or discomfort.    Recent Labs      12/23/18   0533  12/24/18   0421   WBC  5.8  6.3   RBC  4.46  4.29   HEMOGLOBIN  11.1*  10.5*   HEMATOCRIT  39.0  35.0*   MCV  87.4  81.6   MCH  24.9*  24.5*   MCHC  28.5*  30.0*   RDW  54.2*  49.0   PLATELETCT  314  267   MPV  10.6  10.3       Active Hospital Problems    Diagnosis   • Sacral fracture, closed (formerly [facility]) [S32.10XA]     Priority: High   • Hyponatremia [E87.1]     Priority: Medium   • Microcytic anemia [D50.9]     Priority: Medium   • Elevated alkaline phosphatase level [R74.8]     Priority: Medium   • UTI (urinary tract infection) [N39.0]     Priority: Medium   • COPD (chronic obstructive pulmonary disease) (formerly [facility]) [J44.9]     Priority: Medium   • History of breast cancer [Z85.3]     Priority: Medium   • Diabetes mellitus type 2 in obese (formerly [facility]) [E11.69, E66.9]     Priority: Medium   • RLS (restless legs syndrome) [G25.81]     Priority: Medium   • Chronic pain [G89.29]     Priority: Low   • GERD (gastroesophageal reflux disease) [K21.9]     Priority: Low   • Skin yeast infection [B37.2]     Trial nystatin in  groins.        Assessment/Plan:  POD#8 S/P Transsacral screw fixation, right sacral fracture, S1 and S2.  This was a minimally invasive percutaneous technique utilizing cannulated screw fixation, to my knowledge there were no reamings to be sent to path, communicated same to Nursing/Med teams  Wt bearing status - TTWB RLE  Wound care/Drains - dressings changed every other day by nursing starting POD#2  Future Procedures - none planned  Lovenox: Start 12/18, Duration-until ambulatory > 150'  Sutures/Staples out- 10-14 days post operatively  PT/OT-initiated  Antibiotics: completed  DVT Prophylaxis- TEDS/SCDs/Foot pumps  Sue-none  Case Coordination for Discharge Planning - Disposition per Med/Palliative  Follow-Up: needs appointment with Dr. Porter at  [facility] at 10-14 days post-op for re-evaluation, staple removal and radiographs.

## 2018-12-25 NOTE — PROGRESS NOTES
Received updated bedside shift report from Delbert NGUYEN at 1900.      Pt sitting up in bed at this time, a&o x4. Dressing in place to right hip CDI. Dressing to left hip CDI.     Pt now has scheduled tylenol and tramadol, with PRN oxycodone. Pain well controlled.      Call light and belongings within reach, bed down and locked, hourly rounding in progress

## 2018-12-25 NOTE — PROGRESS NOTES
"Hospital Medicine Daily Progress Note    Date of Service  12/25/2018    Chief Complaint  Right hip and pelvic pain after fall.      Hospital Course      68 years old female with past medical history of type 2 diabetes, hypertension, chronic obstructive pulmonary disease, deep vein thrombosis on Xarelto, osteoarthritis, history of breast cancer 10 years ago status post left mastectomy and reconstruction and antihormonal therapy admitted 12/14/2018 with right hip and pelvic pain after fall.  Got transsacral screw fixation of her right sacral fracture S1, S2.  She was informed to have toe touching weightbearing of the right lower extremity. Will need follow-up with [facility], Dr Porter, 10-14 days postoperatively.  She was found to have elevated alkaline phosphatase, a bone scan was done and it showed \"Right hemisacrum/SI joint increased uptake is compatible with known fracture and recent fracture fixation. Pathologic origin is possible. Mild increased right humeral head level uptake could be degenerative or malignant, Mild left hindfoot uptake is likely degenerative\".  It was recommended to follow-up with her oncologist for possible PET scan and or bone biopsy.     Interval Problem Update  Hemodynamically stable overnight no complaints  Saturating well on room air  Reports that her pain is controlled now.  Sodium 133, glucose 119, potassium 4.3  Patient has been accepted at [facility] likely discharge tomorrow  Will need follow-up with [facility], Dr Porter, 10-14 days postoperatively  Will need follow-up with oncology for possible PET scan and/or bone biopsy.    Consultants/Specialty  Dr. Porter, Orthopedics.   Palliative care.   Dr. Orellana    Code Status  DNR    Disposition  Patient has been accepted at [facility] likely discharge tomorrow    Review of Systems  Review of Systems   Constitutional: Negative for chills and fever.   HENT: Negative for sinus pain and sore throat.  "   Eyes: Negative for pain, discharge and redness.   Respiratory: Negative for cough, shortness of breath and stridor.    Cardiovascular: Negative for chest pain and leg swelling.   Gastrointestinal: Negative for abdominal pain, blood in stool, diarrhea, melena and vomiting.   Genitourinary: Negative for flank pain and hematuria.   Musculoskeletal: Positive for falls, joint pain and myalgias.        Right shoulder and lower back pain. Chronic, baseline   Skin: Negative for itching.   Neurological: Positive for weakness. Negative for sensory change and speech change.   Endo/Heme/Allergies: Does not bruise/bleed easily.   Psychiatric/Behavioral: Negative for hallucinations. The patient is not nervous/anxious.         Physical Exam  Temp:  [36.5 °C (97.7 °F)-37.5 °C (99.5 °F)] 36.5 °C (97.7 °F)  Pulse:  [93-97] 94  Resp:  [16-17] 17  BP: (120-134)/(64-69) 120/64    Physical Exam   Constitutional: She is oriented to person, place, and time. No distress.   HENT:   Head: Normocephalic and atraumatic.   Eyes: EOM are normal. Right eye exhibits no discharge. Left eye exhibits no discharge. No scleral icterus.   Neck: Neck supple.   Cardiovascular: Normal rate and regular rhythm.    No murmur heard.  Pulmonary/Chest: No stridor. She has no wheezes. She has no rales.   Abdominal: Soft. Bowel sounds are normal. She exhibits no distension. There is no tenderness.   Musculoskeletal: She exhibits edema (mild right hip - no ecchymosis . ) and tenderness (right hip Incision dressing  dry, & clean.).   Neurological: She is alert and oriented to person, place, and time.   Appears to be moving all extremities   Skin: Skin is warm and dry. Rash (Mild erythema in groin region) noted. She is not diaphoretic. No pallor.   Psychiatric: She has a normal mood and affect. Her behavior is normal.   Vitals reviewed.      Fluids    Intake/Output Summary (Last 24 hours) at 12/25/18 9789  Last data filed at 12/25/18 0845   Gross per 24 hour    Intake                0 ml   Output              250 ml   Net             -250 ml       Laboratory  Recent Labs      12/23/18   0533  12/24/18   0421   WBC  5.8  6.3   RBC  4.46  4.29   HEMOGLOBIN  11.1*  10.5*   HEMATOCRIT  39.0  35.0*   MCV  87.4  81.6   MCH  24.9*  24.5*   MCHC  28.5*  30.0*   RDW  54.2*  49.0   PLATELETCT  314  267   MPV  10.6  10.3     Recent Labs      12/23/18   0533  12/24/18   0421  12/25/18   0302   SODIUM  129*  129*  133*   POTASSIUM  4.0  4.3  4.3   CHLORIDE  94*  94*  96   CO2  26  29  27   GLUCOSE  102*  99  119*   BUN  16  19  18   CREATININE  0.67  0.76  0.67   CALCIUM  10.1  9.7  10.0                   Imaging  NM-BONE SCAN WHOLE BODY   Final Result      Right hemisacrum/SI joint increased uptake is compatible with known fracture and recent fracture fixation. Pathologic origin is possible      Mild increased right humeral head level uptake could be degenerative or malignant. Radiographs are advised      Mild left hindfoot uptake is likely degenerative, radiographs could clarify      DX-PELVIS-TRAUMA SERIES  3-   Final Result      Status post right sacral fracture fixation      DX-PORTABLE FLUOROSCOPY < 1 HOUR   Final Result         Portable fluoroscopy utilized for 1 minute 42 seconds.         DX-PELVIS-TRAUMA SERIES  3-   Final Result      Multiple fluoroscopic images were obtained intraoperatively during sacroiliac screw placement. Please see the patient's chart for full procedural details.      Fluoroscopy time 1 minute 42 seconds.   Fluoroscopy dose 47.8 mGy.      CT-PELVIS W/O PLUS RECONS   Final Result      1.  Right sacral fracture highly likely to be pathologic secondary to underlying malignancy      2.  Also, possible abnormal bone within the right ilium which could indicate malignancy.      3.  Osteoarthritis of both hip joint, both sacroiliac joints, lumbar spine facet joint.      4.  Prior hysterectomy      5.  Abdominal aortic atherosclerotic plaque      6.  Colonic  diverticulosis      OUTSIDE IMAGES-US VASCULAR   Final Result      CT-FOREIGN FILM CAT SCAN   Final Result      OUTSIDE IMAGES-DX PELVIS   Final Result           Assessment/Plan  * Sacral fracture, closed (HCC)- (present on admission)   Assessment & Plan    post Transsacral screw fixation, right sacral fracture, S1 and S2 on 12-17.  Uncontrolled right hip pain.   Bone scan with findings of uptake in fracture site and over bone degenerative regions and joints.  Apparently no bone bx was done and confirmed with Path - no sample available.  Less likely metastatic dz- Discussed findings with patient .  States she will follow up with Dr. Casillas her oncologist in [place] who has been following her for her Breast Cancer following treatment.     Improved pain - continue  IV Dilaudid, Oxycodone, toradol and Zaniflex .   Patient has been accepted at [facility] likely discharge tomorrow       Hyponatremia   Assessment & Plan    Trial Nacl tablets  No seizures, continue to monitor      Microcytic anemia   Assessment & Plan    Likely multi factorial , chronic dz, recent procedural , iron deficient. No symptoms GI or  bleed. Hgb stable.   Start iron supplements  Fu outpt     Elevated alkaline phosphatase level- (present on admission)   Assessment & Plan    Bone scan - non specific for metastatic because the increase uptake noted in regions of fx and degenerative areas.    Outpatient follow-up recommended with oncology for possible PET scan.        RLS (restless legs syndrome)- (present on admission)   Assessment & Plan    Chronic      Diabetes mellitus type 2 in obese (HCC)- (present on admission)   Assessment & Plan    Fair control   Metformin, serial accu checks, sliding scale insulin as needed.       History of breast cancer- (present on admission)   Assessment & Plan    Previously followed with oncologist Dr. Casillas in [facility], last saw them 1-2 years ago.  Ct suspicious for pathologic fx .  Bone scan  abnormal uptake n regions of degenerative changes and chronic arthritis.   To follow up with her oncologist. May need further evaluation with PET scan.      COPD (chronic obstructive pulmonary disease) (HCC)- (present on admission)   Assessment & Plan    Without exacerbation  Respiratory protocol, oxygen as needed      UTI (urinary tract infection)- (present on admission)   Assessment & Plan    Had  symptoms of polyuria. Urine culture NGTD.  Antibiotics stopped.        Skin yeast infection   Assessment & Plan    Nystatin      GERD (gastroesophageal reflux disease)- (present on admission)   Assessment & Plan    PPI     Chronic pain- (present on admission)   Assessment & Plan    At baseline   Gabapentin and tizanidine  Norco as needed  Palliative care input.           VTE prophylaxis: Lovenox.

## 2018-12-25 NOTE — PROGRESS NOTES
Dr. Aragon notified that patient's CBS 80-90s and pt requesting accuchecks to be discontinued; accu checks ACHS discontinued.

## 2018-12-26 ENCOUNTER — PATIENT OUTREACH (OUTPATIENT)
Dept: HEALTH INFORMATION MANAGEMENT | Facility: OTHER | Age: 68
End: 2018-12-26

## 2018-12-26 VITALS
HEIGHT: 60 IN | RESPIRATION RATE: 17 BRPM | BODY MASS INDEX: 33.67 KG/M2 | DIASTOLIC BLOOD PRESSURE: 68 MMHG | TEMPERATURE: 97.5 F | WEIGHT: 171.52 LBS | HEART RATE: 89 BPM | SYSTOLIC BLOOD PRESSURE: 123 MMHG | OXYGEN SATURATION: 90 %

## 2018-12-26 LAB — GLUCOSE BLD-MCNC: 107 MG/DL (ref 65–99)

## 2018-12-26 PROCEDURE — A9270 NON-COVERED ITEM OR SERVICE: HCPCS | Performed by: HOSPITALIST

## 2018-12-26 PROCEDURE — 700102 HCHG RX REV CODE 250 W/ 637 OVERRIDE(OP): Performed by: ORTHOPAEDIC SURGERY

## 2018-12-26 PROCEDURE — 700102 HCHG RX REV CODE 250 W/ 637 OVERRIDE(OP): Performed by: HOSPITALIST

## 2018-12-26 PROCEDURE — A9270 NON-COVERED ITEM OR SERVICE: HCPCS | Performed by: INTERNAL MEDICINE

## 2018-12-26 PROCEDURE — A9270 NON-COVERED ITEM OR SERVICE: HCPCS | Performed by: ORTHOPAEDIC SURGERY

## 2018-12-26 PROCEDURE — 700111 HCHG RX REV CODE 636 W/ 250 OVERRIDE (IP): Performed by: ORTHOPAEDIC SURGERY

## 2018-12-26 PROCEDURE — 700112 HCHG RX REV CODE 229: Performed by: ORTHOPAEDIC SURGERY

## 2018-12-26 PROCEDURE — 99239 HOSP IP/OBS DSCHRG MGMT >30: CPT | Performed by: HOSPITALIST

## 2018-12-26 PROCEDURE — 82962 GLUCOSE BLOOD TEST: CPT

## 2018-12-26 PROCEDURE — 700102 HCHG RX REV CODE 250 W/ 637 OVERRIDE(OP): Performed by: INTERNAL MEDICINE

## 2018-12-26 RX ORDER — ACETAMINOPHEN 325 MG/1
650 TABLET ORAL 3 TIMES DAILY
Qty: 30 TAB | Refills: 0 | Status: ON HOLD | OUTPATIENT
Start: 2018-12-26 | End: 2019-05-23

## 2018-12-26 RX ORDER — FERROUS SULFATE 325(65) MG
325 TABLET ORAL
Qty: 30 TAB | Refills: 0 | Status: ON HOLD | OUTPATIENT
Start: 2018-12-27 | End: 2019-05-29

## 2018-12-26 RX ORDER — SODIUM CHLORIDE 1 G/1
1 TABLET ORAL
Qty: 90 TAB | Refills: 3 | Status: ON HOLD | OUTPATIENT
Start: 2018-12-26 | End: 2019-04-30

## 2018-12-26 RX ORDER — POLYETHYLENE GLYCOL 3350 17 G/17G
17 POWDER, FOR SOLUTION ORAL DAILY
Qty: 15 EACH | Refills: 0 | Status: ON HOLD | OUTPATIENT
Start: 2018-12-27 | End: 2019-05-23

## 2018-12-26 RX ORDER — NYSTATIN 100000 [USP'U]/G
POWDER TOPICAL
Qty: 15 G | Refills: 0 | Status: ON HOLD | OUTPATIENT
Start: 2018-12-26 | End: 2019-05-23

## 2018-12-26 RX ORDER — TRAMADOL HYDROCHLORIDE 50 MG/1
50 TABLET ORAL 3 TIMES DAILY
Qty: 9 TAB | Refills: 0 | Status: SHIPPED | OUTPATIENT
Start: 2018-12-26 | End: 2018-12-29

## 2018-12-26 RX ORDER — OXYCODONE HYDROCHLORIDE 5 MG/1
5 TABLET ORAL EVERY 8 HOURS PRN
Qty: 9 TAB | Refills: 0 | Status: SHIPPED | OUTPATIENT
Start: 2018-12-26 | End: 2018-12-28

## 2018-12-26 RX ADMIN — SODIUM CHLORIDE TAB 1 GM 1 G: 1 TAB at 12:22

## 2018-12-26 RX ADMIN — GABAPENTIN 300 MG: 300 CAPSULE ORAL at 04:25

## 2018-12-26 RX ADMIN — OMEPRAZOLE 20 MG: 20 CAPSULE, DELAYED RELEASE ORAL at 04:25

## 2018-12-26 RX ADMIN — FERROUS SULFATE TAB 325 MG (65 MG ELEMENTAL FE) 325 MG: 325 (65 FE) TAB at 08:56

## 2018-12-26 RX ADMIN — ACETAMINOPHEN 650 MG: 325 TABLET, FILM COATED ORAL at 04:25

## 2018-12-26 RX ADMIN — SODIUM CHLORIDE TAB 1 GM 1 G: 1 TAB at 08:56

## 2018-12-26 RX ADMIN — ENOXAPARIN SODIUM 40 MG: 100 INJECTION SUBCUTANEOUS at 04:26

## 2018-12-26 RX ADMIN — PRAMIPEXOLE DIHYDROCHLORIDE 0.25 MG: 0.25 TABLET ORAL at 12:22

## 2018-12-26 RX ADMIN — FUROSEMIDE 40 MG: 40 TABLET ORAL at 04:25

## 2018-12-26 RX ADMIN — POLYETHYLENE GLYCOL 3350 1 PACKET: 17 POWDER, FOR SOLUTION ORAL at 04:27

## 2018-12-26 RX ADMIN — ACETAMINOPHEN 650 MG: 325 TABLET, FILM COATED ORAL at 12:22

## 2018-12-26 RX ADMIN — GABAPENTIN 300 MG: 300 CAPSULE ORAL at 12:22

## 2018-12-26 RX ADMIN — OXYCODONE HYDROCHLORIDE 5 MG: 5 TABLET ORAL at 07:13

## 2018-12-26 RX ADMIN — DOCUSATE SODIUM 100 MG: 100 CAPSULE, LIQUID FILLED ORAL at 04:25

## 2018-12-26 RX ADMIN — TRAMADOL HYDROCHLORIDE 50 MG: 50 TABLET, FILM COATED ORAL at 12:22

## 2018-12-26 RX ADMIN — PRAMIPEXOLE DIHYDROCHLORIDE 0.25 MG: 0.25 TABLET ORAL at 04:26

## 2018-12-26 RX ADMIN — LOSARTAN POTASSIUM 50 MG: 50 TABLET ORAL at 04:25

## 2018-12-26 RX ADMIN — TRAMADOL HYDROCHLORIDE 50 MG: 50 TABLET, FILM COATED ORAL at 04:25

## 2018-12-26 ASSESSMENT — PAIN SCALES - GENERAL
PAINLEVEL_OUTOF10: 10
PAINLEVEL_OUTOF10: 4
PAINLEVEL_OUTOF10: 6

## 2018-12-26 NOTE — DISCHARGE PLANNING
Received Transport Form @ 6681  Spoke to Taniya @ [place]    Transport is scheduled for 12/26 @1300 going to [facility].

## 2018-12-26 NOTE — PROGRESS NOTES
Patient discharged to [facility]. Discharge packet sent with patient. Report called to receiving facility.

## 2018-12-26 NOTE — PROGRESS NOTES
"Palliative Care  Patient being wheeled out by [facility] dressed in her own clothes on her way to [facility].  She reports she is feeling good and the changes made to her pain control regimen on 12/24/18 by Dr. Orellana worked well, \"I'm feeling good.\"  She wished me a happy New Year and was wheeled off the floor.    Thank you for allowing Palliative Care to participate in this patient's care. Please call our team with questions and/or additional needs.    KRIS Cuba.  Palliative Care Nurse Practitioner  [phone]            "

## 2018-12-26 NOTE — DISCHARGE SUMMARY
"Discharge Summary    CHIEF COMPLAINT ON ADMISSION  Right hip and pelvic pain after fall.      Reason for Admission  Pelivc Fracture     Admission Date  12/14/2018    CODE STATUS  DNAR/DNI    HPI & HOSPITAL COURSE  68 years old female with past medical history of type 2 diabetes, hypertension, chronic obstructive pulmonary disease, deep vein thrombosis on Xarelto, osteoarthritis, history of breast cancer 10 years ago status post left mastectomy and reconstruction and antihormonal therapy admitted 12/14/2018 with right hip and pelvic pain after fall.  Got transsacral screw fixation of her right sacral fracture S1, S2.  She was informed to have toe touching weightbearing of the right lower extremity. Will need follow-up with [facility], Dr Porter, 10-14 days postoperatively.  She was found to have elevated alkaline phosphatase, a bone scan was done and it showed \"Right hemisacrum/SI joint increased uptake is compatible with known fracture and recent fracture fixation. Pathologic origin is possible. Mild increased right humeral head level uptake could be degenerative or malignant, Mild left hindfoot uptake is likely degenerative\".  It was recommended to follow-up with her oncologist for possible PET scan and or bone biopsy. Therefore, she is discharged in fair and stable condition to skilled nursing facility. The patient met 2-midnight criteria for an inpatient stay at the time of discharge.    Discharge Date  12/26/2018      FOLLOW UP ITEMS POST DISCHARGE  Will need follow-up with [facility], Dr Porter, 10-14 days postoperatively  Will need follow-up with oncology for possible PET scan and/or bone biopsy.   Discussed with     DISCHARGE DIAGNOSES  Principal Problem:    Sacral fracture, closed (HCC) POA: Yes  Active Problems:    UTI (urinary tract infection) POA: Yes    COPD (chronic obstructive pulmonary disease) ([facility]) POA: Yes    History of breast cancer POA: Yes    Diabetes " mellitus type 2 in obese (HCC) POA: Yes    RLS (restless legs syndrome) POA: Yes    Elevated alkaline phosphatase level POA: Yes    Microcytic anemia POA: Unknown    Hyponatremia POA: Unknown    Chronic pain POA: Yes    GERD (gastroesophageal reflux disease) POA: Yes    Skin yeast infection POA: Unknown      Overview: Trial nystatin in groins.   Resolved Problems:    * No resolved hospital problems. *      Consultants/Specialty  Dr. Porter, Orthopedics.   Palliative care.   Dr. Orellana    Code Status  DNAR / DNI    Disposition  [place]     Review of Systems  Review of Systems   Constitutional: Negative for chills and fever.   HENT: Negative for sinus pain and sore throat.    Eyes: Negative for pain, discharge and redness.   Respiratory: Negative for cough, shortness of breath and stridor.    Cardiovascular: Negative for chest pain and leg swelling.   Gastrointestinal: Negative for abdominal pain, blood in stool, diarrhea, melena and vomiting.   Genitourinary: Negative for flank pain and hematuria.   Musculoskeletal: Positive for falls, joint pain and myalgias.        Right shoulder and lower back pain. Chronic, baseline   Skin: Negative for itching.   Neurological: Positive for weakness. Negative for sensory change and speech change.   Endo/Heme/Allergies: Does not bruise/bleed easily.   Psychiatric/Behavioral: Negative for hallucinations. The patient is not nervous/anxious.        Physical Exam  Temp:  [36.5 °C (97.7 °F)-37.5 °C (99.5 °F)] 36.5 °C (97.7 °F)  Pulse:  [93-97] 94  Resp:  [16-17] 17  BP: (120-134)/(64-69) 120/64    Physical Exam   Constitutional: She is oriented to person, place, and time. No distress.   HENT:   Head: Normocephalic and atraumatic.   Eyes: EOM are normal. Right eye exhibits no discharge. Left eye exhibits no discharge. No scleral icterus.   Neck: Neck supple.   Cardiovascular: Normal rate and regular rhythm.    No murmur heard.  Pulmonary/Chest: No stridor. She has no wheezes. She  has no rales.   Abdominal: Soft. Bowel sounds are normal. She exhibits no distension. There is no tenderness.   Musculoskeletal: She exhibits edema (mild right hip - no ecchymosis . ) and tenderness (right hip Incision dressing  dry, & clean.).   Neurological: She is alert and oriented to person, place, and time.   Appears to be moving all extremities   Skin: Skin is warm and dry. Rash (Mild erythema in groin region) noted. She is not diaphoretic. No pallor.   Psychiatric: She has a normal mood and affect. Her behavior is normal.   Vitals reviewed.      Laboratory  Recent Labs      12/23/18   0533  12/24/18   0421   WBC  5.8  6.3   RBC  4.46  4.29   HEMOGLOBIN  11.1*  10.5*   HEMATOCRIT  39.0  35.0*   MCV  87.4  81.6   MCH  24.9*  24.5*   MCHC  28.5*  30.0*   RDW  54.2*  49.0   PLATELETCT  314  267   MPV  10.6  10.3     Recent Labs      12/23/18   0533  12/24/18   0421  12/25/18   0302   SODIUM  129*  129*  133*   POTASSIUM  4.0  4.3  4.3   CHLORIDE  94*  94*  96   CO2  26  29  27   GLUCOSE  102*  99  119*   BUN  16  19  18   CREATININE  0.67  0.76  0.67   CALCIUM  10.1  9.7  10.0               MEDICATIONS ON DISCHARGE     Medication List      START taking these medications      Instructions   acetaminophen 325 MG Tabs  Commonly known as:  TYLENOL   Take 2 Tabs by mouth 3 times a day.  Dose:  650 mg     ferrous sulfate 325 (65 Fe) MG tablet  Start taking on:  12/27/2018   Take 1 Tab by mouth every morning with breakfast.  Dose:  325 mg     nystatin powder  Commonly known as:  MYCOSTATIN   Apply to the groin     oxyCODONE immediate-release 5 MG Tabs  Commonly known as:  ROXICODONE   Take 1 Tab by mouth every 8 hours as needed for up to 2 days.  Dose:  5 mg     polyethylene glycol/lytes Pack  Start taking on:  12/27/2018  Commonly known as:  MIRALAX   Take 1 Packet by mouth every day.  Dose:  17 g     sodium chloride 1 GM Tabs  Commonly known as:  SALT   Take 1 Tab by mouth 3 times a day, with meals.  Dose:  1 g      tramadol 50 MG Tabs  Commonly known as:  ULTRAM   Take 1 Tab by mouth 3 times a day for 3 days.  Dose:  50 mg        CONTINUE taking these medications      Instructions   furosemide 40 MG Tabs  Commonly known as:  LASIX   Take 40 mg by mouth 2 Times a Day.  Dose:  40 mg     gabapentin 300 MG Caps  Commonly known as:  NEURONTIN   Take 300 mg by mouth 2 Times a Day.  Dose:  300 mg     losartan 50 MG Tabs  Commonly known as:  COZAAR   Take 50 mg by mouth every day.  Dose:  50 mg     metFORMIN 500 MG Tabs  Commonly known as:  GLUCOPHAGE   Take 500 mg by mouth 2 times a day, with meals.  Dose:  500 mg     omeprazole 20 MG delayed-release capsule  Commonly known as:  PRILOSEC   Take 20 mg by mouth every day.  Dose:  20 mg     pramipexole 0.25 MG Tabs  Commonly known as:  MIRAPEX   Take 0.25 mg by mouth as needed.  Dose:  0.25 mg     pravastatin 20 MG Tabs  Commonly known as:  PRAVACHOL   Take 40 mg by mouth every evening.  Dose:  40 mg     rivaroxaban 15 MG Tabs tablet  Commonly known as:  XARELTO   Take 15 mg by mouth every day.  Dose:  15 mg     tizanidine 4 MG Tabs  Commonly known as:  ZANAFLEX   Take 2 mg by mouth 2 times a day.  Dose:  2 mg        STOP taking these medications    HYDROcodone/acetaminophen  MG Tabs  Commonly known as:  NORCO     naproxen 500 MG Tabs  Commonly known as:  NAPROSYN     triamcinolone acetonide 0.1 % Crea  Commonly known as:  KENALOG            In prescribing controlled substances to this patient, I certify that I have obtained and reviewed the medical history of Muriel Watkins. I have also made a good desire effort to obtain applicable records from other providers who have treated the patient and records did not demonstrate any increased risk of substance abuse that would prevent me from prescribing controlled substances.     I have conducted a physical exam and documented it. I have reviewed Ms. Watkins’s prescription history as maintained by the [place] Prescription Monitoring  Program.     I have assessed the patient’s risk for abuse, dependency, and addiction using the validated Opioid Risk Tool available at https://www.mdcalc.com/mdaovm-dozn-kpnn-ort-narcotic-abuse.     Given the above, I believe the benefits of controlled substance therapy outweigh the risks. The reasons for prescribing controlled substances include non-narcotic, oral analgesic alternatives have been inadequate for pain control. Accordingly, I have discussed the risk and benefits, treatment plan, and alternative therapies with the patient.       Allergies  No Known Allergies    DIET  Orders Placed This Encounter   Procedures   • Diet Order Regular, Diabetic     Standing Status:   Standing     Number of Occurrences:   1     Order Specific Question:   Diet:     Answer:   Regular [1]     Order Specific Question:   Diet:     Answer:   Diabetic [3]       ACTIVITY  As tolerated.   Toe touching weightbearing of the right lower extremity     PROCEDURES  Transsacral screw fixation, right sacral fracture, S1 and S2     LABORATORY  Lab Results   Component Value Date    SODIUM 133 (L) 12/25/2018    POTASSIUM 4.3 12/25/2018    CHLORIDE 96 12/25/2018    CO2 27 12/25/2018    GLUCOSE 119 (H) 12/25/2018    BUN 18 12/25/2018    CREATININE 0.67 12/25/2018        Lab Results   Component Value Date    WBC 6.3 12/24/2018    HEMOGLOBIN 10.5 (L) 12/24/2018    HEMATOCRIT 35.0 (L) 12/24/2018    PLATELETCT 267 12/24/2018        Total time of the discharge process exceeds 42 minutes.

## 2018-12-26 NOTE — DISCHARGE INSTRUCTIONS
Discharge Instructions    Discharged to other by medical transportation with escort. Discharged via wheelchair, hospital escort: Yes.  Special equipment needed: Walker    Be sure to schedule a follow-up appointment with your primary care doctor or any specialists as instructed.     Discharge Plan:   Diet Plan: Discussed  Activity Level: Discussed  Confirmed Follow up Appointment: Patient to Call and Schedule Appointment  Medication Reconciliation Updated: Yes  Pneumococcal Vaccine Administered/Refused: Not given - Patient refused pneumococcal vaccine  Influenza Vaccine Indication: Patient Refuses    I understand that a diet low in cholesterol, fat, and sodium is recommended for good health. Unless I have been given specific instructions below for another diet, I accept this instruction as my diet prescription.   Other diet: Diabetic Diet    Special Instructions: Discharge instructions for the Orthopedic Patient    Follow up with Primary Care Physician within 2 weeks of discharge to home, regarding:  Review of medications and diagnostic testing.  Surveillance for medical complications.  Workup and treatment of osteoporosis, if appropriate.     -Is this a Joint Replacement patient? No    -Is this patient being discharged with medication to prevent blood clots?  Yes, Xarelto Rivaroxaban oral tablets  What is this medicine?  RIVAROXABAN (ri va SON a ban) is an anticoagulant (blood thinner). It is used to treat blood clots in the lungs or in the veins. It is also used after knee or hip surgeries to prevent blood clots. It is also used to lower the chance of stroke in people with a medical condition called atrial fibrillation.  This medicine may be used for other purposes; ask your health care provider or pharmacist if you have questions.  COMMON BRAND NAME(S): Xarelto, Xarelto Starter Pack  What should I tell my health care provider before I take this medicine?  They need to know if you have any of these  conditions:  -bleeding disorders  -bleeding in the brain  -blood in your stools (black or tarry stools) or if you have blood in your vomit  -history of stomach bleeding  -kidney disease  -liver disease  -low blood counts, like low white cell, platelet, or red cell counts  -recent or planned spinal or epidural procedure  -take medicines that treat or prevent blood clots  -an unusual or allergic reaction to rivaroxaban, other medicines, foods, dyes, or preservatives  -pregnant or trying to get pregnant  -breast-feeding  How should I use this medicine?  Take this medicine by mouth with a glass of water. Follow the directions on the prescription label. Take your medicine at regular intervals. Do not take it more often than directed. Do not stop taking except on your doctor's advice. Stopping this medicine may increase your risk of a blood clot. Be sure to refill your prescription before you run out of medicine.  If you are taking this medicine after hip or knee replacement surgery, take it with or without food. If you are taking this medicine for atrial fibrillation, take it with your evening meal. If you are taking this medicine to treat blood clots, take it with food at the same time each day. If you are unable to swallow your tablet, you may crush the tablet and mix it in applesauce. Then, immediately eat the applesauce. You should eat more food right after you eat the applesauce containing the crushed tablet.  Talk to your pediatrician regarding the use of this medicine in children. Special care may be needed.  Overdosage: If you think you have taken too much of this medicine contact a poison control center or emergency room at once.  NOTE: This medicine is only for you. Do not share this medicine with others.  What if I miss a dose?  If you take your medicine once a day and miss a dose, take the missed dose as soon as you remember. If you take your medicine twice a day and miss a dose, take the missed dose  immediately. In this instance, 2 tablets may be taken at the same time. The next day you should take 1 tablet twice a day as directed.  What may interact with this medicine?  Do not take this medicine with any of the following medications:  -defibrotide  This medicine may also interact with the following medications:  -aspirin and aspirin-like medicines  -certain antibiotics like erythromycin, azithromycin, and clarithromycin  -certain medicines for fungal infections like ketoconazole and itraconazole  -certain medicines for irregular heart beat like amiodarone, quinidine, dronedarone  -certain medicines for seizures like carbamazepine, phenytoin  -certain medicines that treat or prevent blood clots like warfarin, enoxaparin, and dalteparin  -conivaptan  -diltiazem  -felodipine  -indinavir  -lopinavir; ritonavir  -NSAIDS, medicines for pain and inflammation, like ibuprofen or naproxen  -ranolazine  -rifampin  -ritonavir  -SNRIs, medicines for depression, like desvenlafaxine, duloxetine, levomilnacipran, venlafaxine  -SSRIs, medicines for depression, like citalopram, escitalopram, fluoxetine, fluvoxamine, paroxetine, sertraline  -Hira's wort  -verapamil  This list may not describe all possible interactions. Give your health care provider a list of all the medicines, herbs, non-prescription drugs, or dietary supplements you use. Also tell them if you smoke, drink alcohol, or use illegal drugs. Some items may interact with your medicine.  What should I watch for while using this medicine?  Visit your doctor or health care professional for regular checks on your progress.  Notify your doctor or health care professional and seek emergency treatment if you develop breathing problems; changes in vision; chest pain; severe, sudden headache; pain, swelling, warmth in the leg; trouble speaking; sudden numbness or weakness of the face, arm or leg. These can be signs that your condition has gotten worse.  If you are going  to have surgery or other procedure, tell your doctor that you are taking this medicine.  What side effects may I notice from receiving this medicine?  Side effects that you should report to your doctor or health care professional as soon as possible:  -allergic reactions like skin rash, itching or hives, swelling of the face, lips, or tongue  -back pain  -redness, blistering, peeling or loosening of the skin, including inside the mouth  -signs and symptoms of bleeding such as bloody or black, tarry stools; red or dark-brown urine; spitting up blood or brown material that looks like coffee grounds; red spots on the skin; unusual bruising or bleeding from the eye, gums, or nose  Side effects that usually do not require medical attention (report to your doctor or health care professional if they continue or are bothersome):  -dizziness  -muscle pain  This list may not describe all possible side effects. Call your doctor for medical advice about side effects. You may report side effects to FDA at 6-290-FDA-7905.  Where should I keep my medicine?  Keep out of the reach of children.  Store at room temperature between 15 and 30 degrees C (59 and 86 degrees F). Throw away any unused medicine after the expiration date.  NOTE: This sheet is a summary. It may not cover all possible information. If you have questions about this medicine, talk to your doctor, pharmacist, or health care provider.  © 2018 Elsevier/Gold Standard (2017-09-06 16:29:33)      · Is patient discharged on Warfarin / Coumadin?   No     Depression / Suicide Risk    As you are discharged from this [facility] facility, it is important to learn how to keep safe from harming yourself.    Recognize the warning signs:  · Abrupt changes in personality, positive or negative- including increase in energy   · Giving away possessions  · Change in eating patterns- significant weight changes-  positive or negative  · Change in sleeping patterns- unable to sleep or  sleeping all the time   · Unwillingness or inability to communicate  · Depression  · Unusual sadness, discouragement and loneliness  · Talk of wanting to die  · Neglect of personal appearance   · Rebelliousness- reckless behavior  · Withdrawal from people/activities they love  · Confusion- inability to concentrate     If you or a loved one observes any of these behaviors or has concerns about self-harm, here's what you can do:  · Talk about it- your feelings and reasons for harming yourself  · Remove any means that you might use to hurt yourself (examples: pills, rope, extension cords, firearm)  · Get professional help from the community (Mental Health, Substance Abuse, psychological counseling)  · Do not be alone:Call your Safe Contact- someone whom you trust who will be there for you.  · Call your local CRISIS HOTLINE 464-5171 or 392-152-7592  · Call your local Children's Mobile Crisis Response Team [place] (153) 217-2754 or www.[employer]  · Call the toll free National Suicide Prevention Hotlines   · National Suicide Prevention Lifeline 748-557-UWGK (3963)  · National Hope Line Network 800-SUICIDE (666-3773)    Pelvic Fracture  Pelvic fractures are usually due to a fall or car accident. Minor fractures of the pelvic bones can often be treated at home. Walking or changing positions may be painful. You should rest for several days but then begin weight bearing and walking as tolerated. Crutches or a walker are often used in the first few weeks to reduce pain and enable you to get around easier. Prolonged bed rest for a pelvic fracture is not recommended. It increases your risk for blood clots and other complications.  Pelvic fractures usually heal in 6-12 weeks without any special treatment. Treatment may include pain medicine, medicine to keep your stool soft, and crutches or a walker.   Take these simple measures to avoid further falls and injuries:  · Get rid of your throw rugs and electrical cords  from traveled areas.   · Avoid poorly lit stairs.   · Do not wear high heel shoes.   If you are at risk for osteoporosis, treatment includes regular exercise, a diet rich in calcium and vitamin D, and possibly other drugs to help preserve bone. Ask your primary care physician if you should have a bone density test (DEXA scan) if you are 50 years or older.  SEEK IMMEDIATE MEDICAL CARE IF:  You develop blood in your urine, increased pain, severe constipation, increasing difficulty walking, pain or unusual swelling in your legs, chest pain, fever, shortness of breath, or if you faint or fall.   MAKE SURE YOU:   · Understand these instructions.   · Will watch your condition.   · Will get help right away if you are not doing well or get worse.   Document Released: 01/25/2006 Document Revised: 03/11/2013 Document Reviewed: 04/07/2010  [employer] Patient Information ©2013 [employer].

## 2018-12-26 NOTE — PROGRESS NOTES
Received updated bedside shift report from Corinne RN at 1900.     Pt sitting in chair at this time, a&o x4, states pain 5/10. Pt stated increased redness to right thigh/groin area. Pt states she thinks it is from not wearing underwear for so long, briefs provided. Barrier cream and nystatin powder in use.     Call light and belongings within reach, bed down and locked hourly rounding in progress.

## 2018-12-26 NOTE — DISCHARGE PLANNING
Anticipated Discharge Disposition: SNF    Action: Accepted to [facility] today @1pm per Van transportation. Met with patient and she is agreeable. Cobra signed.     Barriers to Discharge: none    Plan: SNF

## 2019-03-08 ENCOUNTER — HOSPITAL ENCOUNTER (INPATIENT)
Facility: MEDICAL CENTER | Age: 69
LOS: 53 days | DRG: 981 | End: 2019-04-30
Attending: INTERNAL MEDICINE | Admitting: INTERNAL MEDICINE
Payer: MEDICARE

## 2019-03-08 ENCOUNTER — HOSPITAL ENCOUNTER (OUTPATIENT)
Facility: MEDICAL CENTER | Age: 69
DRG: 981 | End: 2019-03-08
Payer: MEDICARE

## 2019-03-08 DIAGNOSIS — L02.91 ABSCESS: ICD-10-CM

## 2019-03-08 DIAGNOSIS — N39.0 URINARY TRACT INFECTION WITHOUT HEMATURIA, SITE UNSPECIFIED: ICD-10-CM

## 2019-03-08 DIAGNOSIS — M62.89 MASS OF ILIOPSOAS MUSCLE GROUP: ICD-10-CM

## 2019-03-08 DIAGNOSIS — B35.6 FUNGAL INFECTION OF THE GROIN: ICD-10-CM

## 2019-03-08 DIAGNOSIS — G25.81 RLS (RESTLESS LEGS SYNDROME): ICD-10-CM

## 2019-03-08 DIAGNOSIS — G93.9 LESION OF BRAIN: ICD-10-CM

## 2019-03-08 DIAGNOSIS — G93.40 ENCEPHALOPATHY: ICD-10-CM

## 2019-03-08 DIAGNOSIS — Z85.3 HISTORY OF BREAST CANCER: ICD-10-CM

## 2019-03-08 DIAGNOSIS — J44.9 CHRONIC OBSTRUCTIVE PULMONARY DISEASE, UNSPECIFIED COPD TYPE (HCC): ICD-10-CM

## 2019-03-08 DIAGNOSIS — G06.0 MULTIPLE BRAIN ABSCESSES: ICD-10-CM

## 2019-03-08 PROBLEM — R41.0 CONFUSION: Status: ACTIVE | Noted: 2019-03-08

## 2019-03-08 PROBLEM — R50.9 FEVER: Status: ACTIVE | Noted: 2019-03-08

## 2019-03-08 PROBLEM — D64.9 ANEMIA: Status: ACTIVE | Noted: 2019-03-08

## 2019-03-08 LAB
CANCER AG125 SERPL-ACNC: 20.9 U/ML (ref 0–35)
CEA SERPL-MCNC: 2 NG/ML (ref 0–3)
CRP SERPL HS-MCNC: 3.53 MG/DL (ref 0–0.75)
ERYTHROCYTE [SEDIMENTATION RATE] IN BLOOD BY WESTERGREN METHOD: 37 MM/HOUR (ref 0–30)
GLUCOSE BLD-MCNC: 97 MG/DL (ref 65–99)
PROCALCITONIN SERPL-MCNC: 0.25 NG/ML

## 2019-03-08 PROCEDURE — 82378 CARCINOEMBRYONIC ANTIGEN: CPT

## 2019-03-08 PROCEDURE — 700111 HCHG RX REV CODE 636 W/ 250 OVERRIDE (IP): Performed by: HOSPITALIST

## 2019-03-08 PROCEDURE — 84145 PROCALCITONIN (PCT): CPT

## 2019-03-08 PROCEDURE — 93005 ELECTROCARDIOGRAM TRACING: CPT | Performed by: HOSPITALIST

## 2019-03-08 PROCEDURE — 82962 GLUCOSE BLOOD TEST: CPT

## 2019-03-08 PROCEDURE — 86140 C-REACTIVE PROTEIN: CPT

## 2019-03-08 PROCEDURE — 86304 IMMUNOASSAY TUMOR CA 125: CPT

## 2019-03-08 PROCEDURE — 36415 COLL VENOUS BLD VENIPUNCTURE: CPT

## 2019-03-08 PROCEDURE — 770009 HCHG ROOM/CARE - ONCOLOGY SEMI PRI*

## 2019-03-08 PROCEDURE — 85652 RBC SED RATE AUTOMATED: CPT

## 2019-03-08 PROCEDURE — 700105 HCHG RX REV CODE 258: Performed by: HOSPITALIST

## 2019-03-08 PROCEDURE — 700102 HCHG RX REV CODE 250 W/ 637 OVERRIDE(OP): Performed by: HOSPITALIST

## 2019-03-08 PROCEDURE — 99221 1ST HOSP IP/OBS SF/LOW 40: CPT | Mod: AI | Performed by: HOSPITALIST

## 2019-03-08 PROCEDURE — A9270 NON-COVERED ITEM OR SERVICE: HCPCS | Performed by: HOSPITALIST

## 2019-03-08 RX ORDER — OXYCODONE HYDROCHLORIDE 5 MG/1
2.5 TABLET ORAL
Status: DISCONTINUED | OUTPATIENT
Start: 2019-03-08 | End: 2019-04-12

## 2019-03-08 RX ORDER — POLYETHYLENE GLYCOL 3350 17 G/17G
1 POWDER, FOR SOLUTION ORAL
Status: DISCONTINUED | OUTPATIENT
Start: 2019-03-08 | End: 2019-03-23

## 2019-03-08 RX ORDER — ACETAMINOPHEN 325 MG/1
650 TABLET ORAL EVERY 6 HOURS PRN
Status: DISCONTINUED | OUTPATIENT
Start: 2019-03-08 | End: 2019-04-12

## 2019-03-08 RX ORDER — MORPHINE SULFATE 4 MG/ML
2 INJECTION, SOLUTION INTRAMUSCULAR; INTRAVENOUS
Status: DISCONTINUED | OUTPATIENT
Start: 2019-03-08 | End: 2019-04-12

## 2019-03-08 RX ORDER — POLYETHYLENE GLYCOL 3350 17 G/17G
1 POWDER, FOR SOLUTION ORAL DAILY
Status: DISCONTINUED | OUTPATIENT
Start: 2019-03-08 | End: 2019-03-23

## 2019-03-08 RX ORDER — ONDANSETRON 4 MG/1
4 TABLET, ORALLY DISINTEGRATING ORAL EVERY 4 HOURS PRN
Status: DISCONTINUED | OUTPATIENT
Start: 2019-03-08 | End: 2019-04-12

## 2019-03-08 RX ORDER — BISACODYL 10 MG
10 SUPPOSITORY, RECTAL RECTAL
Status: DISCONTINUED | OUTPATIENT
Start: 2019-03-08 | End: 2019-03-23

## 2019-03-08 RX ORDER — OXYCODONE HYDROCHLORIDE 5 MG/1
5 TABLET ORAL
Status: DISCONTINUED | OUTPATIENT
Start: 2019-03-08 | End: 2019-03-17

## 2019-03-08 RX ORDER — GABAPENTIN 300 MG/1
300 CAPSULE ORAL 2 TIMES DAILY
Status: DISCONTINUED | OUTPATIENT
Start: 2019-03-08 | End: 2019-04-12

## 2019-03-08 RX ORDER — AMOXICILLIN 250 MG
2 CAPSULE ORAL 2 TIMES DAILY
Status: DISCONTINUED | OUTPATIENT
Start: 2019-03-08 | End: 2019-03-23

## 2019-03-08 RX ORDER — ONDANSETRON 2 MG/ML
4 INJECTION INTRAMUSCULAR; INTRAVENOUS EVERY 4 HOURS PRN
Status: DISCONTINUED | OUTPATIENT
Start: 2019-03-08 | End: 2019-04-12

## 2019-03-08 RX ORDER — OMEPRAZOLE 20 MG/1
20 CAPSULE, DELAYED RELEASE ORAL DAILY
Status: DISCONTINUED | OUTPATIENT
Start: 2019-03-08 | End: 2019-04-12

## 2019-03-08 RX ORDER — DEXTROSE MONOHYDRATE 25 G/50ML
25 INJECTION, SOLUTION INTRAVENOUS
Status: DISCONTINUED | OUTPATIENT
Start: 2019-03-08 | End: 2019-03-13

## 2019-03-08 RX ADMIN — PIPERACILLIN AND TAZOBACTAM 3.38 G: 3; .375 INJECTION, POWDER, LYOPHILIZED, FOR SOLUTION INTRAVENOUS; PARENTERAL at 19:50

## 2019-03-08 RX ADMIN — OXYCODONE HYDROCHLORIDE 5 MG: 5 TABLET ORAL at 22:51

## 2019-03-08 RX ADMIN — OMEPRAZOLE 20 MG: 20 CAPSULE, DELAYED RELEASE ORAL at 19:49

## 2019-03-08 RX ADMIN — PIPERACILLIN AND TAZOBACTAM 3.38 G: 3; .375 INJECTION, POWDER, LYOPHILIZED, FOR SOLUTION INTRAVENOUS; PARENTERAL at 21:33

## 2019-03-08 RX ADMIN — GABAPENTIN 300 MG: 300 CAPSULE ORAL at 19:48

## 2019-03-08 RX ADMIN — ENOXAPARIN SODIUM 40 MG: 100 INJECTION SUBCUTANEOUS at 19:48

## 2019-03-08 RX ADMIN — SENNOSIDES AND DOCUSATE SODIUM 2 TABLET: 8.6; 5 TABLET ORAL at 19:50

## 2019-03-08 ASSESSMENT — LIFESTYLE VARIABLES: ALCOHOL_USE: NO

## 2019-03-08 ASSESSMENT — PATIENT HEALTH QUESTIONNAIRE - PHQ9
1. LITTLE INTEREST OR PLEASURE IN DOING THINGS: NOT AT ALL
2. FEELING DOWN, DEPRESSED, IRRITABLE, OR HOPELESS: NOT AT ALL
SUM OF ALL RESPONSES TO PHQ9 QUESTIONS 1 AND 2: 0
2. FEELING DOWN, DEPRESSED, IRRITABLE, OR HOPELESS: NEARLY EVERY DAY
2. FEELING DOWN, DEPRESSED, IRRITABLE, OR HOPELESS: NEARLY EVERY DAY
1. LITTLE INTEREST OR PLEASURE IN DOING THINGS: NEARLY EVERY DAY
SUM OF ALL RESPONSES TO PHQ9 QUESTIONS 1 AND 2: 4
SUM OF ALL RESPONSES TO PHQ9 QUESTIONS 1 AND 2: 6
1. LITTLE INTEREST OR PLEASURE IN DOING THINGS: SEVERAL DAYS

## 2019-03-08 ASSESSMENT — COGNITIVE AND FUNCTIONAL STATUS - GENERAL
MOVING TO AND FROM BED TO CHAIR: A LITTLE
DRESSING REGULAR UPPER BODY CLOTHING: A LITTLE
STANDING UP FROM CHAIR USING ARMS: A LOT
TURNING FROM BACK TO SIDE WHILE IN FLAT BAD: A LITTLE
CLIMB 3 TO 5 STEPS WITH RAILING: A LOT
DRESSING REGULAR LOWER BODY CLOTHING: TOTAL
WALKING IN HOSPITAL ROOM: A LOT
TOILETING: TOTAL
PERSONAL GROOMING: A LITTLE
MOVING FROM LYING ON BACK TO SITTING ON SIDE OF FLAT BED: A LITTLE
HELP NEEDED FOR BATHING: A LOT
DAILY ACTIVITIY SCORE: 14
SUGGESTED CMS G CODE MODIFIER DAILY ACTIVITY: CK
MOBILITY SCORE: 15
SUGGESTED CMS G CODE MODIFIER MOBILITY: CK

## 2019-03-08 NOTE — PROGRESS NOTES
Patient arrived to floor with [facility] via gurney. ADT order released, MD paged. Orders in place. R PIV in place upon arrival - flushed - no signs of infiltration - NS locked. Patient has no complaints of pain or nausea at this time. 2 Rn skin assessment completed. Oriented patient to room and use of call light. Bed in lowest, locked position. Call light and personal belongings within reach.     /88   Pulse (!) 112   Temp 37.7 °C (99.8 °F) (Oral)   Resp (!) 26   Wt 81.6 kg (180 lb)   SpO2 90%   Breastfeeding? No   BMI 35.15 kg/m²

## 2019-03-08 NOTE — PROGRESS NOTES
Direct admit from [facility]. Accepted by Dr. Ramírez for metastatic cancer, ence.  ADT signed & held, needs to be released upon pt arrival.  No written orders received.  Pt coming by ground.

## 2019-03-09 ENCOUNTER — HOSPITAL ENCOUNTER (OUTPATIENT)
Dept: RADIOLOGY | Facility: MEDICAL CENTER | Age: 69
End: 2019-03-09

## 2019-03-09 PROBLEM — G93.40 ENCEPHALOPATHY: Status: ACTIVE | Noted: 2019-03-08

## 2019-03-09 LAB
ALBUMIN SERPL BCP-MCNC: 3.2 G/DL (ref 3.2–4.9)
ALBUMIN/GLOB SERPL: 0.9 G/DL
ALP SERPL-CCNC: 314 U/L (ref 30–99)
ALT SERPL-CCNC: 16 U/L (ref 2–50)
ANION GAP SERPL CALC-SCNC: 5 MMOL/L (ref 0–11.9)
AST SERPL-CCNC: 30 U/L (ref 12–45)
BASOPHILS # BLD AUTO: 0.6 % (ref 0–1.8)
BASOPHILS # BLD: 0.05 K/UL (ref 0–0.12)
BILIRUB SERPL-MCNC: 0.7 MG/DL (ref 0.1–1.5)
BUN SERPL-MCNC: 11 MG/DL (ref 8–22)
CALCIUM SERPL-MCNC: 9.3 MG/DL (ref 8.5–10.5)
CHLORIDE SERPL-SCNC: 103 MMOL/L (ref 96–112)
CO2 SERPL-SCNC: 23 MMOL/L (ref 20–33)
CREAT SERPL-MCNC: 0.63 MG/DL (ref 0.5–1.4)
EKG IMPRESSION: NORMAL
EOSINOPHIL # BLD AUTO: 0.05 K/UL (ref 0–0.51)
EOSINOPHIL NFR BLD: 0.6 % (ref 0–6.9)
ERYTHROCYTE [DISTWIDTH] IN BLOOD BY AUTOMATED COUNT: 53.8 FL (ref 35.9–50)
GLOBULIN SER CALC-MCNC: 3.7 G/DL (ref 1.9–3.5)
GLUCOSE BLD-MCNC: 87 MG/DL (ref 65–99)
GLUCOSE BLD-MCNC: 89 MG/DL (ref 65–99)
GLUCOSE SERPL-MCNC: 104 MG/DL (ref 65–99)
HCT VFR BLD AUTO: 36.3 % (ref 37–47)
HGB BLD-MCNC: 10.9 G/DL (ref 12–16)
IMM GRANULOCYTES # BLD AUTO: 0.04 K/UL (ref 0–0.11)
IMM GRANULOCYTES NFR BLD AUTO: 0.5 % (ref 0–0.9)
LYMPHOCYTES # BLD AUTO: 1.31 K/UL (ref 1–4.8)
LYMPHOCYTES NFR BLD: 16.4 % (ref 22–41)
MCH RBC QN AUTO: 24.1 PG (ref 27–33)
MCHC RBC AUTO-ENTMCNC: 30 G/DL (ref 33.6–35)
MCV RBC AUTO: 80.3 FL (ref 81.4–97.8)
MONOCYTES # BLD AUTO: 0.73 K/UL (ref 0–0.85)
MONOCYTES NFR BLD AUTO: 9.1 % (ref 0–13.4)
NEUTROPHILS # BLD AUTO: 5.81 K/UL (ref 2–7.15)
NEUTROPHILS NFR BLD: 72.8 % (ref 44–72)
NRBC # BLD AUTO: 0 K/UL
NRBC BLD-RTO: 0 /100 WBC
PLATELET # BLD AUTO: 222 K/UL (ref 164–446)
PMV BLD AUTO: 9.8 FL (ref 9–12.9)
POTASSIUM SERPL-SCNC: 3.4 MMOL/L (ref 3.6–5.5)
PROT SERPL-MCNC: 6.9 G/DL (ref 6–8.2)
RBC # BLD AUTO: 4.52 M/UL (ref 4.2–5.4)
SODIUM SERPL-SCNC: 131 MMOL/L (ref 135–145)
WBC # BLD AUTO: 8 K/UL (ref 4.8–10.8)

## 2019-03-09 PROCEDURE — 99233 SBSQ HOSP IP/OBS HIGH 50: CPT | Performed by: HOSPITALIST

## 2019-03-09 PROCEDURE — 770004 HCHG ROOM/CARE - ONCOLOGY PRIVATE *

## 2019-03-09 PROCEDURE — 700111 HCHG RX REV CODE 636 W/ 250 OVERRIDE (IP): Performed by: HOSPITALIST

## 2019-03-09 PROCEDURE — 700102 HCHG RX REV CODE 250 W/ 637 OVERRIDE(OP): Performed by: HOSPITALIST

## 2019-03-09 PROCEDURE — 93010 ELECTROCARDIOGRAM REPORT: CPT | Performed by: INTERNAL MEDICINE

## 2019-03-09 PROCEDURE — A9270 NON-COVERED ITEM OR SERVICE: HCPCS | Performed by: HOSPITALIST

## 2019-03-09 PROCEDURE — 36415 COLL VENOUS BLD VENIPUNCTURE: CPT

## 2019-03-09 PROCEDURE — 80053 COMPREHEN METABOLIC PANEL: CPT

## 2019-03-09 PROCEDURE — 700105 HCHG RX REV CODE 258: Performed by: HOSPITALIST

## 2019-03-09 PROCEDURE — 85025 COMPLETE CBC W/AUTO DIFF WBC: CPT

## 2019-03-09 PROCEDURE — 82962 GLUCOSE BLOOD TEST: CPT

## 2019-03-09 RX ORDER — LOSARTAN POTASSIUM 50 MG/1
50 TABLET ORAL DAILY
Status: DISCONTINUED | OUTPATIENT
Start: 2019-03-09 | End: 2019-04-12

## 2019-03-09 RX ORDER — TRAMADOL HYDROCHLORIDE 50 MG/1
50 TABLET ORAL EVERY 6 HOURS PRN
Status: DISCONTINUED | OUTPATIENT
Start: 2019-03-09 | End: 2019-04-12

## 2019-03-09 RX ORDER — POTASSIUM CHLORIDE 20 MEQ/1
40 TABLET, EXTENDED RELEASE ORAL ONCE
Status: COMPLETED | OUTPATIENT
Start: 2019-03-09 | End: 2019-03-09

## 2019-03-09 RX ORDER — PRAMIPEXOLE DIHYDROCHLORIDE 0.25 MG/1
0.25 TABLET ORAL PRN
Status: DISCONTINUED | OUTPATIENT
Start: 2019-03-09 | End: 2019-03-10

## 2019-03-09 RX ADMIN — SENNOSIDES AND DOCUSATE SODIUM 2 TABLET: 8.6; 5 TABLET ORAL at 06:11

## 2019-03-09 RX ADMIN — GABAPENTIN 300 MG: 300 CAPSULE ORAL at 06:12

## 2019-03-09 RX ADMIN — PIPERACILLIN AND TAZOBACTAM 3.38 G: 3; .375 INJECTION, POWDER, LYOPHILIZED, FOR SOLUTION INTRAVENOUS; PARENTERAL at 13:17

## 2019-03-09 RX ADMIN — ENOXAPARIN SODIUM 40 MG: 100 INJECTION SUBCUTANEOUS at 06:12

## 2019-03-09 RX ADMIN — TRAMADOL HYDROCHLORIDE 50 MG: 50 TABLET, COATED ORAL at 13:17

## 2019-03-09 RX ADMIN — POLYETHYLENE GLYCOL 3350 1 PACKET: 17 POWDER, FOR SOLUTION ORAL at 06:12

## 2019-03-09 RX ADMIN — LOSARTAN POTASSIUM 50 MG: 50 TABLET ORAL at 17:17

## 2019-03-09 RX ADMIN — PIPERACILLIN AND TAZOBACTAM 3.38 G: 3; .375 INJECTION, POWDER, LYOPHILIZED, FOR SOLUTION INTRAVENOUS; PARENTERAL at 20:23

## 2019-03-09 RX ADMIN — OXYCODONE HYDROCHLORIDE 5 MG: 5 TABLET ORAL at 17:16

## 2019-03-09 RX ADMIN — PIPERACILLIN AND TAZOBACTAM 3.38 G: 3; .375 INJECTION, POWDER, LYOPHILIZED, FOR SOLUTION INTRAVENOUS; PARENTERAL at 06:12

## 2019-03-09 RX ADMIN — OMEPRAZOLE 20 MG: 20 CAPSULE, DELAYED RELEASE ORAL at 06:11

## 2019-03-09 RX ADMIN — OXYCODONE HYDROCHLORIDE 5 MG: 5 TABLET ORAL at 03:26

## 2019-03-09 RX ADMIN — POTASSIUM CHLORIDE 40 MEQ: 1500 TABLET, EXTENDED RELEASE ORAL at 08:34

## 2019-03-09 RX ADMIN — GABAPENTIN 300 MG: 300 CAPSULE ORAL at 17:18

## 2019-03-09 ASSESSMENT — ENCOUNTER SYMPTOMS
SORE THROAT: 0
PALPITATIONS: 0
ABDOMINAL PAIN: 0
BLOOD IN STOOL: 0
DIARRHEA: 0
HALLUCINATIONS: 0
FEVER: 0
FLANK PAIN: 0
MYALGIAS: 0
EYE DISCHARGE: 0
BRUISES/BLEEDS EASILY: 0
SHORTNESS OF BREATH: 0
EYE REDNESS: 0
COUGH: 0
VOMITING: 0
NERVOUS/ANXIOUS: 0
CHILLS: 0
HEMOPTYSIS: 0
LOSS OF CONSCIOUSNESS: 0
SPEECH CHANGE: 0
BACK PAIN: 1
FOCAL WEAKNESS: 0
SPUTUM PRODUCTION: 0
SEIZURES: 0
EYE PAIN: 0
STRIDOR: 0

## 2019-03-09 ASSESSMENT — LIFESTYLE VARIABLES: EVER_SMOKED: YES

## 2019-03-09 NOTE — H&P
DATE OF ADMISSION:  03/08/2019    IDENTIFICATION:  This is a 68-year-old female.    PRIMARY CARE PHYSICIAN:  Somewhat unclear.  The patient lives in [place], [place].  She states that she has been in and out of hospitals.    CHIEF COMPLAINT:  Right hip pain and question of metastatic breast cancer.    HISTORY OF PRESENT ILLNESS:  This is a 68-year-old female [facility] and [facility] pain.  The patient was evaluated   extensively, was found to have a right iliopsoas lesion, questionable   metastasis versus other.  In the course, the patient was stabilized and was   then attempted to be transferred to a skilled nursing facility when the   patient became febrile up to 104 and confused.  She was started on empiric   antibiotics.  She was treated for a history of UTI, which at this time is not   the case.  She in the course had an MRI of the brain, which showed multiple   lesions, questionable metastasis.  The patient was transferred now to [facility] to higher level of care and need for intervention and   oncologic evaluation.  The patient is an overall fair historian.  She speaks   English quite well.  She states that she has been in and out of hospitals with   pain and difficulties, with infections, and UTI.  Additional history is   currently unknown.    ALLERGIES:  No known drug allergies.    MEDICATIONS:  Regimen on admission is as follows:  1.  Tylenol 2 tablets t.i.d. p.r.n.  2.  Ferrous sulfate 325 mg daily.  3.  Lasix 40 mg b.i.d., this is somewhat unclear.  4.  Neurontin 300 mg b.i.d.  5.  Cozaar 50 mg daily, also unclear.  6.  Metformin 500 mg b.i.d.  7.  Nystatin powder.  8.  Omeprazole 20 mg daily.  9.  MiraLax 1 packet daily.  10.  Mirapex 0.25 mg at bedtime.  11.  Pravachol 40 mg daily.  12.  Xarelto 15 mg daily.  13.  Sodium chloride 1 g t.i.d.  14.  Zanaflex 4 mg 2 tablets b.i.d.    PAST MEDICAL HISTORY:  1.  History of breast cancer with  bilateral mastectomy and reconstruction.  2.  Chronic obstructive pulmonary disease.  3.  History of pelvic fracture, status post pinning in 08/2018.  4.  Diabetes type 2.  5.  Hypertension.  6.  Adult failure to thrive.  7.  Weakness.  8.  Depression and anxiety.  9.  Anemia.  10.  Obstructive sleep apnea, not on CPAP or oxygen.  11.  Obesity.  12.  Bladder dysfunction.  13.  Gastroesophageal reflux disease.  14.  Dyslipidemia.  15.  History of deep venous thrombosis.  16.  Insomnia.    PAST SURGICAL HISTORY:  1.  History of bilateral mastectomy and reconstruction.  2.  History of right shoulder evaluation.  3.  History of tummy tuck.  4.  Hysterectomy.  5.  History of left ankle repair.    SOCIAL HISTORY:  The patient is an ex-smoker.  She apparently quit 10 years   ago.  Alcohol none, drugs none.  She is .  She has no children.    FAMILY HISTORY:  Positive for heart disease and some sort of cancer in her   father.    REVIEW OF SYSTEMS:  CONSTITUTIONAL:  She has had some fevers, no chills.  HEENT:  No headache, no vision changes.  CARDIOVASCULAR:  Denies chest pain, palpitation, PND or orthopnea.  LUNGS:  Without cough or sputum production.  GASTROINTESTINAL:  No nausea, vomiting, or diarrhea.  GENITOURINARY:  No dysuria or hematuria.  MUSCULOSKELETAL:  With significant right and left hip pain, back pain.  NEUROLOGIC:  Without focal weakness, numbness, or tingling.    PHYSICAL EXAMINATION:  VITAL SIGNS:  The patient is found to have temperature 37.7, pulse 112,   respirations in the 20s, blood pressure 158/80.  The patient is saturating 98%   on room air.  GENERAL:  This is an elderly  female in no acute distress, no acute   respiratory distress.  Well developed, well nourished.  HEENT:  Normocephalic, atraumatic.  EOMI.  PERRLA. Mucous membranes are moist.    Nasopharynx is otherwise clear.  NECK:  Stiff range of motion.  No lymphadenopathy or thyromegaly.  CHEST:  Normal bony structures.  LUNGS:   Clear to auscultation anteriorly.  HEART:  Regular rate.  S1 and S2 are normal.  ABDOMEN:  Protuberant.  There is no tenderness, no distention.  Positive bowel   sounds in all 4 quadrants.  No hepatosplenomegaly is appreciated.  GENITOURINARY:  Normal external female genitalia.  RECTAL:  Deferred.  MUSCULOSKELETAL:  No clubbing, cyanosis, or edema.  NEUROLOGIC:  The patient appears to be alert and oriented x4.  Cranial nerves   II-XII are grossly intact.  No sensory loss is elicited.    LABORATORY DATA AND IMAGING:  MRI shows numerous small enhancing lesions   throughout the brain, question of metastasis.  Echocardiogram showed EF of   55%, LVH, no valvular changes.  Sodium 134, potassium 3.5, chloride 103,   bicarbonate 24, glucose 106, BUN is 11, creatinine 0.7, ALT 39, AST 32,   alkaline phosphatase 258.  White cell count 5.4, hemoglobin 10.8, platelet   count 212.  INR 1.2, CRP 5.4, T4 is 2.44.  TSH is normal.  Chest x-ray is with   question of edema.  CT chest, no PE and no acute changes.  Abdomen shows a   right iliac wing fracture as well as a right iliopsoas lesion, question of   metastasis.    ASSESSMENT AND PLAN:  1.  Right hip, right pelvic pain with question of metastatic lesion versus   infection.  The patient apparently had images pushed to our radiology   department; those to be uploaded and further evaluated.  If the patient would   be a candidate for intervention, aspiration and biopsy of the lesion to   further delineate; if the patient had a fever, certainly this could be abscess   infectious versus other.  2.  History of breast cancer with now multiple brain lesions concerning for   metastatic disease.  Again the patient does not appear septic at this time.  I   think that this could be of infectious nature, images to be further reviewed   and referred to Dr. Black for oncologic needs.  3.  History of chronic obstructive pulmonary disease.  Continue respiratory   therapy protocol.  4.  History of  pelvic fracture.  5.  Diabetes type 2.  Continue with tight control.  6.  History of hypertension.  We will reevaluate and re-institute medication   regimen as indicated.  7.  Adult failure to thrive from weakness.  8.  Depression and anxiety.  9.  Anemia with likely degree of blood loss and chronic disease.  10.  Obstructive sleep apnea, not using CPAP.  11.  History of urinary tract infection.  12.  History of bilateral mastectomy and reconstruction secondary to breast   cancer.  13.  History of deep venous thrombosis.  14.  History of tobacco abuse.    OVERALL PLAN:  The patient is admitted with fever, currently unknown origin   and abnormal imaging including a CT pelvis and MRI of the brain for further   evaluation and reevaluation by radiology to see if any other lesions will be   amenable to be biopsied or aspirated.  The patient will continue on empiric   antibiotics and gentle empiric treatment until further information is   available.  Patient is overall significantly ill with high risk and will   likely require 2+ overnight stay in the hospital.    Time spent on this admission in excess of 65 minutes.       ____________________________________     MD NICKY BERNSTEIN / JUAN    DD:  03/08/2019 19:58:25  DT:  03/08/2019 21:58:41    D#:  [id number]  Job#:  [id number]

## 2019-03-09 NOTE — PROGRESS NOTES
Received report and assumed patient care at change of shift. Patient is resting in bed, A&Ox3. Pt speaks Iranian at times, has delayed responses to questions being asked. Patient has c/o pain, medications provided per MAR. Plan of care discussed, questions answered. Bed is in the lowest position and locked, call light within reach, non-skid socks in place, hourly rounding. Patient reports no further needs and this time.

## 2019-03-09 NOTE — RESPIRATORY CARE
COPD EDUCATION by COPD CLINICAL EDUCATOR  3/8/2019 at 5:34 PM by Genevieve Atkinson     Patient reviewed by COPD education team. Patient does not qualify for COPD program.

## 2019-03-09 NOTE — CARE PLAN
Problem: Safety  Goal: Will remain free from falls  Outcome: PROGRESSING AS EXPECTED  Patient oriented to room and use of call light. Bed alarm on. Bed in lowest, locked position. Call light and personal belongings within reach     Problem: Skin Integrity  Goal: Risk for impaired skin integrity will decrease  Outcome: PROGRESSING AS EXPECTED  2 RN skin check completed. Pillows in use for support and repositioning.

## 2019-03-09 NOTE — ASSESSMENT & PLAN NOTE
Return of fluid collection, repeat drainage in IR with cultures, drain placed 3/29 - 10ml purulent material drained and sent for culture - no growth a/o 3/30  Was on linezolid and cefepime and now on vanco/merrem - end date to be > 6 weeks of treatment as now clinical resolution needed  ID following   CHAS negative for endocarditis.  Patient has sacral screws, Orthopedic surgery recommended hardware to stay for at least 6 months to promote adequate healing and to continue with antibiotics for now.  4/20: Patient did not qualify for IR aspiration and drain placement for the above-mentioned abscesses as they were too small.  repeat CT abdomen/pelvis 4/23 - fluid collections still too small to drain  MRI brain - old lesions improving but unfortunately new lesions right cerebellum and left basal ganglion.

## 2019-03-09 NOTE — PROGRESS NOTES
Hospital Medicine Daily Progress Note    Date of Service  3/9/2019    Chief Complaint  Right hip and back pain    Hospital Course      68-year-old female past medical history of diabetes, hypertension, history of deep vein thrombosis on her rivaroxaban, and remote history of breast cancer.  Transferred from [place] for concern for metastasis to the brain and the right iliopsoas, gluteal abscess concerning for infection versus malignancy. Oncology consulted, recommended IR - biopsy right psoas.          Interval Problem Update  Heart rate 90s-112  Saturating well on 0-2 L  Systolic blood pressure 130s-160s, I am adding PRN medications  I am consulting oncology for brain metastasis.  I am consulting interventional radiology for a soft tissue biopsy.    Hypokalemia 3.4, I am replacing  DM well controlled, B. sugars running 80s-90s   WBC 8, hemoglobin 10.9, platelets 222  Alkaline phosphatase 314, possibly from bone involvement.    20.9, Carcinoembryonic antigen 2  Discussed with patient, patient's brother, oncology, patient's nurse and charge nurse.      Consultants/Specialty  Oncology Dr Black    Code Status  FULL     Disposition  TBD     Review of Systems  Review of Systems   Constitutional: Negative for chills and fever.   HENT: Negative for congestion and sore throat.    Eyes: Negative for pain, discharge and redness.   Respiratory: Negative for cough, hemoptysis, sputum production, shortness of breath and stridor.    Cardiovascular: Negative for chest pain, palpitations and leg swelling.   Gastrointestinal: Negative for abdominal pain, blood in stool, diarrhea and vomiting.   Genitourinary: Negative for flank pain, hematuria and urgency.   Musculoskeletal: Positive for back pain and joint pain. Negative for myalgias.        Back and right hip    Skin: Negative.    Neurological: Negative for speech change, focal weakness, seizures and loss of consciousness.   Endo/Heme/Allergies: Does not bruise/bleed  easily.   Psychiatric/Behavioral: Negative for hallucinations and suicidal ideas. The patient is not nervous/anxious.         Physical Exam  Temp:  [36.7 °C (98 °F)-37.7 °C (99.8 °F)] 36.7 °C (98 °F)  Pulse:  [] 102  Resp:  [18-32] 20  BP: (133-162)/(64-88) 155/85  SpO2:  [90 %-96 %] 93 %    Physical Exam   Constitutional: She is oriented to person, place, and time. She appears well-developed and well-nourished. No distress.   HENT:   Head: Normocephalic and atraumatic.   Right Ear: External ear normal.   Left Ear: External ear normal.   Eyes: Pupils are equal, round, and reactive to light. Conjunctivae are normal. Right eye exhibits no discharge. Left eye exhibits no discharge.   Neck: Normal range of motion. Neck supple. No JVD present. No tracheal deviation present. No thyromegaly present.   Cardiovascular: Regular rhythm.  Exam reveals no gallop and no friction rub.    No murmur heard.  Tachy    Pulmonary/Chest: Effort normal and breath sounds normal. No stridor. No respiratory distress. She has no wheezes. She has no rales. She exhibits no tenderness.   Abdominal: Soft. Bowel sounds are normal. She exhibits no distension. There is no tenderness. There is no rebound and no guarding.   Musculoskeletal: Normal range of motion. She exhibits edema (mild ) and tenderness (back and right hip ). She exhibits no deformity.   Neurological: She is alert and oriented to person, place, and time. No cranial nerve deficit. Coordination normal.   Skin: Skin is warm and dry. No rash noted. She is not diaphoretic. No erythema. No pallor.   Psychiatric: She has a normal mood and affect. Judgment normal.   Nursing note and vitals reviewed.      Fluids  No intake or output data in the 24 hours ending 03/09/19 1517    Laboratory  Recent Labs      03/09/19   0021   WBC  8.0   RBC  4.52   HEMOGLOBIN  10.9*   HEMATOCRIT  36.3*   MCV  80.3*   MCH  24.1*   MCHC  30.0*   RDW  53.8*   PLATELETCT  222   MPV  9.8     Recent Labs       03/09/19   0021   SODIUM  131*   POTASSIUM  3.4*   CHLORIDE  103   CO2  23   GLUCOSE  104*   BUN  11   CREATININE  0.63   CALCIUM  9.3                   Imaging  OUTSIDE IMAGES-CT CHEST/ABDOMEN/PELVIS   Final Result      OUTSIDE IMAGES-DX CHEST   Final Result      [id number] FILM X-RAY   Final Result      OUTSIDE IMAGES-MR BRAIN   Final Result      OUTSIDE IMAGES-MR BRAIN   Final Result           Assessment/Plan  * Lesion of brain   Assessment & Plan    Likely metastatic cancer, ? Breast   Consulted Dr Black for brain mets, recommending IR Bx of psoas muscle   IR Bx of psoas muscle ordered       Encephalopathy   Assessment & Plan    Likely toxic metabolic, and structural from metastasis   Multifactorial pain, pain medications, dehydration, infection    Improved, on 3/9/2019 she was alert and oriented X4      Mass of iliopsoas muscle group   Assessment & Plan    Consulted Dr Black for brain mets, recommending IR Bx of psoas muscle   IR Bx of psoas muscle ordered    Can not r/o infection, abscess, continue antibiotics       Anemia   Assessment & Plan    Continue to monitor, transfuse for hemoglobin less than 7     RLS (restless legs syndrome)- (present on admission)   Assessment & Plan    Pramipexole       Diabetes mellitus type 2 in obese (HCC)- (present on admission)   Assessment & Plan    Glycated hemoglobin 6.3, 3 months ago  Short acting insulin  Accu-Checks, hypoglycemia protocol      COPD (chronic obstructive pulmonary disease) (HCC)- (present on admission)   Assessment & Plan    Oxygen as needed, Respiratory protocol, Bronchodilators, Incentive spirometry     GERD (gastroesophageal reflux disease)- (present on admission)   Assessment & Plan    Omeprazole     Chronic pain- (present on admission)   Assessment & Plan    Multifactorial  Likely secondary to metastatic cancer           VTE prophylaxis: was on Lovenox. Holding for planned Bx Mar 10, was on Rivaroxaban as outpatient.

## 2019-03-09 NOTE — ASSESSMENT & PLAN NOTE
Waxes and wanes.  Likely secondary to multiple brain lesions.  Avoid benzodiazepines and/or anticholinergic medications.  Avoid sedatives or hypnotics.  At baseline for now.

## 2019-03-09 NOTE — ASSESSMENT & PLAN NOTE
Could be due to toxic effects of infection on bone marrow.  Afebrile.  ANC within normal limits.  Continue to monitor.  Gradually improving.  Resolved although normal white blood cell count and normal ANC.  Mild thrombocytopenia.

## 2019-03-09 NOTE — ASSESSMENT & PLAN NOTE
IR Bx of psoas muscle ordered - was abscess  Continued treatment of infection  TTE and CHAS negative for vegetations.  MRI brain to repeat  Palliative consulted.  Vanco/merrem per ID  MRI brain 4/24 showing improvement old lesions but new lesions in right cerebellum and left basal ganglion  MRI brain due in 1 month for re-evaluation of duration of antibiotics, currently plan to continue for at least 4 more weeks.

## 2019-03-09 NOTE — ASSESSMENT & PLAN NOTE
Multifactorial  Likely secondary to abscess, fracture, bed bound status  Pain control, avoid narcotics given severe impact on patient's mentation.

## 2019-03-10 PROBLEM — I10 ESSENTIAL HYPERTENSION: Status: ACTIVE | Noted: 2019-03-10

## 2019-03-10 LAB
ALBUMIN SERPL BCP-MCNC: 3 G/DL (ref 3.2–4.9)
ALBUMIN/GLOB SERPL: 0.8 G/DL
ALP SERPL-CCNC: 267 U/L (ref 30–99)
ALT SERPL-CCNC: 13 U/L (ref 2–50)
ANION GAP SERPL CALC-SCNC: 8 MMOL/L (ref 0–11.9)
APTT PPP: 37.3 SEC (ref 24.7–36)
AST SERPL-CCNC: 31 U/L (ref 12–45)
BASOPHILS # BLD AUTO: 0.7 % (ref 0–1.8)
BASOPHILS # BLD: 0.04 K/UL (ref 0–0.12)
BILIRUB SERPL-MCNC: 0.6 MG/DL (ref 0.1–1.5)
BUN SERPL-MCNC: 9 MG/DL (ref 8–22)
CALCIUM SERPL-MCNC: 8.9 MG/DL (ref 8.5–10.5)
CHLORIDE SERPL-SCNC: 100 MMOL/L (ref 96–112)
CO2 SERPL-SCNC: 24 MMOL/L (ref 20–33)
CREAT SERPL-MCNC: 0.57 MG/DL (ref 0.5–1.4)
EOSINOPHIL # BLD AUTO: 0.09 K/UL (ref 0–0.51)
EOSINOPHIL NFR BLD: 1.6 % (ref 0–6.9)
ERYTHROCYTE [DISTWIDTH] IN BLOOD BY AUTOMATED COUNT: 52.9 FL (ref 35.9–50)
GLOBULIN SER CALC-MCNC: 3.8 G/DL (ref 1.9–3.5)
GLUCOSE BLD-MCNC: 74 MG/DL (ref 65–99)
GLUCOSE BLD-MCNC: 80 MG/DL (ref 65–99)
GLUCOSE BLD-MCNC: 82 MG/DL (ref 65–99)
GLUCOSE BLD-MCNC: 93 MG/DL (ref 65–99)
GLUCOSE BLD-MCNC: 96 MG/DL (ref 65–99)
GLUCOSE SERPL-MCNC: 92 MG/DL (ref 65–99)
HCT VFR BLD AUTO: 36.2 % (ref 37–47)
HGB BLD-MCNC: 11.1 G/DL (ref 12–16)
IMM GRANULOCYTES # BLD AUTO: 0.03 K/UL (ref 0–0.11)
IMM GRANULOCYTES NFR BLD AUTO: 0.5 % (ref 0–0.9)
INR PPP: 1.07 (ref 0.87–1.13)
LYMPHOCYTES # BLD AUTO: 0.66 K/UL (ref 1–4.8)
LYMPHOCYTES NFR BLD: 11.8 % (ref 22–41)
MCH RBC QN AUTO: 24.6 PG (ref 27–33)
MCHC RBC AUTO-ENTMCNC: 30.7 G/DL (ref 33.6–35)
MCV RBC AUTO: 80.3 FL (ref 81.4–97.8)
MONOCYTES # BLD AUTO: 0.65 K/UL (ref 0–0.85)
MONOCYTES NFR BLD AUTO: 11.6 % (ref 0–13.4)
NEUTROPHILS # BLD AUTO: 4.12 K/UL (ref 2–7.15)
NEUTROPHILS NFR BLD: 73.8 % (ref 44–72)
NRBC # BLD AUTO: 0 K/UL
NRBC BLD-RTO: 0 /100 WBC
PLATELET # BLD AUTO: 195 K/UL (ref 164–446)
PMV BLD AUTO: 10.2 FL (ref 9–12.9)
POTASSIUM SERPL-SCNC: 3.4 MMOL/L (ref 3.6–5.5)
PROT SERPL-MCNC: 6.8 G/DL (ref 6–8.2)
PROTHROMBIN TIME: 14 SEC (ref 12–14.6)
RBC # BLD AUTO: 4.51 M/UL (ref 4.2–5.4)
SODIUM SERPL-SCNC: 132 MMOL/L (ref 135–145)
WBC # BLD AUTO: 5.6 K/UL (ref 4.8–10.8)

## 2019-03-10 PROCEDURE — 99232 SBSQ HOSP IP/OBS MODERATE 35: CPT | Performed by: HOSPITALIST

## 2019-03-10 PROCEDURE — 700111 HCHG RX REV CODE 636 W/ 250 OVERRIDE (IP): Performed by: HOSPITALIST

## 2019-03-10 PROCEDURE — 700102 HCHG RX REV CODE 250 W/ 637 OVERRIDE(OP): Performed by: HOSPITALIST

## 2019-03-10 PROCEDURE — 85025 COMPLETE CBC W/AUTO DIFF WBC: CPT

## 2019-03-10 PROCEDURE — 302164 EASY CAP: Performed by: INTERNAL MEDICINE

## 2019-03-10 PROCEDURE — A9270 NON-COVERED ITEM OR SERVICE: HCPCS | Performed by: HOSPITALIST

## 2019-03-10 PROCEDURE — 700105 HCHG RX REV CODE 258: Performed by: HOSPITALIST

## 2019-03-10 PROCEDURE — 770004 HCHG ROOM/CARE - ONCOLOGY PRIVATE *

## 2019-03-10 PROCEDURE — 302118 SHAMPOO,NO RINSE: Performed by: HOSPITALIST

## 2019-03-10 PROCEDURE — 80053 COMPREHEN METABOLIC PANEL: CPT

## 2019-03-10 PROCEDURE — 85610 PROTHROMBIN TIME: CPT

## 2019-03-10 PROCEDURE — 85730 THROMBOPLASTIN TIME PARTIAL: CPT

## 2019-03-10 PROCEDURE — 700101 HCHG RX REV CODE 250: Performed by: FAMILY MEDICINE

## 2019-03-10 PROCEDURE — 82962 GLUCOSE BLOOD TEST: CPT | Mod: 91

## 2019-03-10 PROCEDURE — 36415 COLL VENOUS BLD VENIPUNCTURE: CPT

## 2019-03-10 RX ORDER — PRAMIPEXOLE DIHYDROCHLORIDE 0.25 MG/1
0.25 TABLET ORAL
Status: DISCONTINUED | OUTPATIENT
Start: 2019-03-10 | End: 2019-03-10

## 2019-03-10 RX ORDER — PRAMIPEXOLE DIHYDROCHLORIDE 0.25 MG/1
0.25 TABLET ORAL 2 TIMES DAILY PRN
Status: DISCONTINUED | OUTPATIENT
Start: 2019-03-10 | End: 2019-03-13

## 2019-03-10 RX ORDER — FUROSEMIDE 40 MG/1
40 TABLET ORAL 2 TIMES DAILY
Status: DISCONTINUED | OUTPATIENT
Start: 2019-03-10 | End: 2019-04-02

## 2019-03-10 RX ORDER — SODIUM CHLORIDE AND POTASSIUM CHLORIDE 150; 900 MG/100ML; MG/100ML
INJECTION, SOLUTION INTRAVENOUS CONTINUOUS
Status: ACTIVE | OUTPATIENT
Start: 2019-03-10 | End: 2019-03-11

## 2019-03-10 RX ADMIN — GABAPENTIN 300 MG: 300 CAPSULE ORAL at 05:33

## 2019-03-10 RX ADMIN — OXYCODONE HYDROCHLORIDE 5 MG: 5 TABLET ORAL at 18:33

## 2019-03-10 RX ADMIN — POTASSIUM CHLORIDE AND SODIUM CHLORIDE: 900; 150 INJECTION, SOLUTION INTRAVENOUS at 22:24

## 2019-03-10 RX ADMIN — OMEPRAZOLE 20 MG: 20 CAPSULE, DELAYED RELEASE ORAL at 05:33

## 2019-03-10 RX ADMIN — GABAPENTIN 300 MG: 300 CAPSULE ORAL at 18:00

## 2019-03-10 RX ADMIN — PRAMIPEXOLE DIHYDROCHLORIDE 0.25 MG: 0.25 TABLET ORAL at 12:43

## 2019-03-10 RX ADMIN — ENOXAPARIN SODIUM 40 MG: 100 INJECTION SUBCUTANEOUS at 09:33

## 2019-03-10 RX ADMIN — OXYCODONE HYDROCHLORIDE 5 MG: 5 TABLET ORAL at 03:59

## 2019-03-10 RX ADMIN — OXYCODONE HYDROCHLORIDE 5 MG: 5 TABLET ORAL at 11:34

## 2019-03-10 RX ADMIN — PIPERACILLIN AND TAZOBACTAM 3.38 G: 3; .375 INJECTION, POWDER, LYOPHILIZED, FOR SOLUTION INTRAVENOUS; PARENTERAL at 12:44

## 2019-03-10 RX ADMIN — OXYCODONE HYDROCHLORIDE 5 MG: 5 TABLET ORAL at 15:16

## 2019-03-10 RX ADMIN — FUROSEMIDE 40 MG: 40 TABLET ORAL at 18:00

## 2019-03-10 RX ADMIN — LOSARTAN POTASSIUM 50 MG: 50 TABLET ORAL at 05:33

## 2019-03-10 RX ADMIN — PIPERACILLIN AND TAZOBACTAM 3.38 G: 3; .375 INJECTION, POWDER, LYOPHILIZED, FOR SOLUTION INTRAVENOUS; PARENTERAL at 20:55

## 2019-03-10 RX ADMIN — PIPERACILLIN AND TAZOBACTAM 3.38 G: 3; .375 INJECTION, POWDER, LYOPHILIZED, FOR SOLUTION INTRAVENOUS; PARENTERAL at 05:33

## 2019-03-10 ASSESSMENT — ENCOUNTER SYMPTOMS
SEIZURES: 0
EYE REDNESS: 0
ABDOMINAL PAIN: 0
BACK PAIN: 1
BLOOD IN STOOL: 0
VOMITING: 0
FLANK PAIN: 0
DIARRHEA: 0
PALPITATIONS: 0
STRIDOR: 0
FOCAL WEAKNESS: 0
EYE PAIN: 0
COUGH: 0
LOSS OF CONSCIOUSNESS: 0
CHILLS: 0
SPEECH CHANGE: 0
HEMOPTYSIS: 0
SHORTNESS OF BREATH: 0
MYALGIAS: 0
EYE DISCHARGE: 0
NERVOUS/ANXIOUS: 0
HALLUCINATIONS: 0
SORE THROAT: 0
SPUTUM PRODUCTION: 0
BRUISES/BLEEDS EASILY: 0
FEVER: 0

## 2019-03-10 NOTE — CARE PLAN
Problem: Venous Thromboembolism (VTW)/Deep Vein Thrombosis (DVT) Prevention:  Goal: Patient will participate in Venous Thrombosis (VTE)/Deep Vein Thrombosis (DVT)Prevention Measures  Outcome: PROGRESSING AS EXPECTED  Lovenox given this morning since biopsy not until tomorrow now. SCD's in place and on.    Problem: Pain Management  Goal: Pain level will decrease to patient's comfort goal  Outcome: PROGRESSING AS EXPECTED  Patiet c/o pain in her right hip and leg. PRN oxycodone and tramadol in use.

## 2019-03-10 NOTE — PROGRESS NOTES
Received bedside report from day shift RN. Mally x4. Slow to respond. Q2 turns offered to patient. PIV patent, flushing. Patient grimacing, verbalizing her pain is not as bad. Will continue to monitor. Denies nausea, vomiting. 2L NC,  on. POC discussed with patient. Calls appropriately for assistance. Call light within reach. Bed in lowest and locked position. SCD's in use. Adequate lighting provided. Q2 rounding in place.

## 2019-03-10 NOTE — PROGRESS NOTES
· 2 RN skin check complete with PATRICK Thompson.  · Devices in place - None.  · Skin assessed under devices - n/a.  · Confirmed pressure ulcers found on - none.  · New potential pressure ulcers noted on - none.   · The following interventions in place Pillows in use for support, moisture and barrier cream in use.     Patient has an abrasion on her back - covered with gauze and Tegaderm.

## 2019-03-10 NOTE — PROGRESS NOTES
Patient A&O x4 with intermittent drowsiness and lethargy. Patient tearful throughout the day when trying to discuss situation at home and with  whom she reports she will be getting a divorce from. Brother, René, visited for some time today and provided more information on the home situation. He confirmed there was an EPS case filed and that the patient and  will be getting a divorce and she will not be able to return to her home upon d/c.   Suicide screening done again today; patient denied any suicidal thoughts and behaviors at this time; MD aware.  POC for biopsy tomorrow; NPO at midnight. PIV patent and intact infusing IV abx. Patient incontinent at times today; uses hernal or bedpan. FS ACHS not requiring coverage this shift. Waffle cushion applied to bed. Patient c/o pain to RLE and right hip; oxycodone and tramadol in use for pain. Call light within reach, safety precautions in place, all needs met at this time.     Advanced directive completed by patient and brother. Copy in physical chart.

## 2019-03-10 NOTE — CARE PLAN
Problem: Communication  Goal: The ability to communicate needs accurately and effectively will improve  Outcome: PROGRESSING SLOWER THAN EXPECTED  AxO x4. Patient slow to respond. Fatigue. Patient unable to make needs known.    Problem: Infection  Goal: Will remain free from infection  Outcome: PROGRESSING AS EXPECTED  Low grade temps. IV abx. Will continue to monitor.

## 2019-03-10 NOTE — ASSESSMENT & PLAN NOTE
Status post mastectomy.  Repeat CT showed decrease in size with antibiotics wishes consistent with abscesses rather than metastasis.  Continue IV antibiotis  Appreciate infectious disease recommendations.

## 2019-03-10 NOTE — PROGRESS NOTES
Hospital Medicine Daily Progress Note    Date of Service  3/10/2019    Chief Complaint  Right hip and back pain    Hospital Course      68-year-old female past medical history of diabetes, hypertension, history of deep vein thrombosis on her rivaroxaban, and remote history of breast cancer.  Transferred from [place] for concern for metastasis to the brain and the right iliopsoas, gluteal abscess concerning for infection versus malignancy. Oncology consulted, recommended IR - biopsy right psoas.          Interval Problem Update  Saturating 1-2 L of oxygen  Patient still has pain in the back on the right hip, better controlled today.  Heart rates   Blood pressure better controlled today.  Her Lasix was initially held on admission, I am restarting.  Interventional radiology were not able to do her biopsy today.  Plan for biopsy tomorrow  Hypokalemia 3.4, I am replacing, continue to monitor  DM well controlled, B. sugars running 74-82  Discussed with patient, oncology, patient's nurse .      Consultants/Specialty  Oncology Dr Black    Code Status  FULL     Disposition  TBD     Review of Systems  Review of Systems   Constitutional: Negative for chills and fever.   HENT: Negative for congestion and sore throat.    Eyes: Negative for pain, discharge and redness.   Respiratory: Negative for cough, hemoptysis, sputum production, shortness of breath and stridor.    Cardiovascular: Negative for chest pain, palpitations and leg swelling.   Gastrointestinal: Negative for abdominal pain, blood in stool, diarrhea and vomiting.   Genitourinary: Negative for flank pain, hematuria and urgency.   Musculoskeletal: Positive for back pain and joint pain. Negative for myalgias.        Back and right hip    Skin: Negative.    Neurological: Negative for speech change, focal weakness, seizures and loss of consciousness.   Endo/Heme/Allergies: Does not bruise/bleed easily.   Psychiatric/Behavioral: Negative for hallucinations and  suicidal ideas. The patient is not nervous/anxious.         Physical Exam  Temp:  [36.5 °C (97.7 °F)-37.5 °C (99.5 °F)] 37.5 °C (99.5 °F)  Pulse:  [] 96  Resp:  [16-18] 16  BP: (126-152)/(67-82) 147/67  SpO2:  [93 %-96 %] 93 %    Physical Exam   Constitutional: She is oriented to person, place, and time. She appears well-developed and well-nourished. No distress.   HENT:   Head: Normocephalic and atraumatic.   Right Ear: External ear normal.   Left Ear: External ear normal.   Eyes: Pupils are equal, round, and reactive to light. Conjunctivae are normal. Right eye exhibits no discharge. Left eye exhibits no discharge.   Neck: Normal range of motion. Neck supple. No JVD present. No tracheal deviation present. No thyromegaly present.   Cardiovascular: Regular rhythm.  Exam reveals no gallop and no friction rub.    No murmur heard.  Tachy    Pulmonary/Chest: Effort normal and breath sounds normal. No stridor. No respiratory distress. She has no wheezes. She has no rales. She exhibits no tenderness.   Abdominal: Soft. Bowel sounds are normal. She exhibits no distension. There is no tenderness. There is no rebound and no guarding.   Musculoskeletal: Normal range of motion. She exhibits edema (mild ) and tenderness (back and right hip ). She exhibits no deformity.   Neurological: She is alert and oriented to person, place, and time. No cranial nerve deficit. Coordination normal.   Skin: Skin is warm and dry. No rash noted. She is not diaphoretic. No erythema. No pallor.   Psychiatric: She has a normal mood and affect. Judgment normal.   Nursing note and vitals reviewed.      Fluids    Intake/Output Summary (Last 24 hours) at 03/10/19 1234  Last data filed at 03/10/19 1133   Gross per 24 hour   Intake              480 ml   Output             1200 ml   Net             -720 ml       Laboratory  Recent Labs      03/09/19   0021  03/10/19   0834   WBC  8.0  5.6   RBC  4.52  4.51   HEMOGLOBIN  10.9*  11.1*   HEMATOCRIT   36.3*  36.2*   MCV  80.3*  80.3*   MCH  24.1*  24.6*   MCHC  30.0*  30.7*   RDW  53.8*  52.9*   PLATELETCT  222  195   MPV  9.8  10.2     Recent Labs      03/09/19   0021  03/10/19   0834   SODIUM  131*  132*   POTASSIUM  3.4*  3.4*   CHLORIDE  103  100   CO2  23  24   GLUCOSE  104*  92   BUN  11  9   CREATININE  0.63  0.57   CALCIUM  9.3  8.9     Recent Labs      03/10/19   0834   APTT  37.3*   INR  1.07               Imaging  OUTSIDE IMAGES-CT CHEST/ABDOMEN/PELVIS   Final Result      OUTSIDE IMAGES-DX CHEST   Final Result      BE-[id number]-[id number] FILM X-RAY   Final Result      OUTSIDE IMAGES-MR BRAIN   Final Result      OUTSIDE IMAGES-MR BRAIN   Final Result      IR-CONSULT AND TREAT    (Results Pending)        Assessment/Plan  * Lesion of brain   Assessment & Plan    Likely metastatic cancer, ? Breast   Consulted Dr Black for brain mets, recommending IR Bx of psoas muscle   IR Bx of psoas muscle ordered       Encephalopathy   Assessment & Plan    Likely toxic metabolic, and structural from metastasis   Multifactorial pain, pain medications, dehydration, infection    Improved, on 3/9/2019 she was alert and oriented X4      Mass of iliopsoas muscle group   Assessment & Plan    Consulted Dr Black for brain mets, recommending IR Bx of psoas muscle   IR Bx of psoas muscle ordered    Can not r/o infection, abscess, continue antibiotics       Anemia   Assessment & Plan    Continue to monitor, transfuse for hemoglobin less than 7     RLS (restless legs syndrome)- (present on admission)   Assessment & Plan    Pramipexole       Diabetes mellitus type 2 in obese (HCC)- (present on admission)   Assessment & Plan    Glycated hemoglobin 6.3, 3 months ago  Short acting insulin  Accu-Checks, hypoglycemia protocol       History of breast cancer- (present on admission)   Assessment & Plan    Questionable metastasis to the brain  Oncology consulted, pending biopsy     COPD (chronic obstructive pulmonary disease) (HCC)- (present on  admission)   Assessment & Plan    Oxygen as needed, Respiratory protocol, Bronchodilators, Incentive spirometry     Essential hypertension- (present on admission)   Assessment & Plan    Restarted on losartan and furosemide with hold parameters.     GERD (gastroesophageal reflux disease)- (present on admission)   Assessment & Plan    Omeprazole     Chronic pain- (present on admission)   Assessment & Plan    Multifactorial  Likely secondary to metastatic cancer            VTE prophylaxis: was on Lovenox. Holding for planned Bx Mar 11, was on Rivaroxaban as outpatient.

## 2019-03-11 ENCOUNTER — APPOINTMENT (OUTPATIENT)
Dept: RADIOLOGY | Facility: MEDICAL CENTER | Age: 69
DRG: 981 | End: 2019-03-11
Attending: HOSPITALIST
Payer: MEDICARE

## 2019-03-11 PROBLEM — Z86.718 HISTORY OF DEEP VEIN THROMBOSIS: Status: ACTIVE | Noted: 2019-03-11

## 2019-03-11 LAB
ALBUMIN SERPL BCP-MCNC: 3.4 G/DL (ref 3.2–4.9)
ALBUMIN/GLOB SERPL: 0.8 G/DL
ALP SERPL-CCNC: 360 U/L (ref 30–99)
ALT SERPL-CCNC: 17 U/L (ref 2–50)
ANION GAP SERPL CALC-SCNC: 9 MMOL/L (ref 0–11.9)
AST SERPL-CCNC: 39 U/L (ref 12–45)
BASOPHILS # BLD AUTO: 0.5 % (ref 0–1.8)
BASOPHILS # BLD: 0.03 K/UL (ref 0–0.12)
BILIRUB SERPL-MCNC: 0.7 MG/DL (ref 0.1–1.5)
BUN SERPL-MCNC: 10 MG/DL (ref 8–22)
CALCIUM SERPL-MCNC: 9.2 MG/DL (ref 8.5–10.5)
CHLORIDE SERPL-SCNC: 95 MMOL/L (ref 96–112)
CO2 SERPL-SCNC: 25 MMOL/L (ref 20–33)
CREAT SERPL-MCNC: 0.6 MG/DL (ref 0.5–1.4)
EOSINOPHIL # BLD AUTO: 0.06 K/UL (ref 0–0.51)
EOSINOPHIL NFR BLD: 1 % (ref 0–6.9)
ERYTHROCYTE [DISTWIDTH] IN BLOOD BY AUTOMATED COUNT: 51.1 FL (ref 35.9–50)
GLOBULIN SER CALC-MCNC: 4.2 G/DL (ref 1.9–3.5)
GLUCOSE BLD-MCNC: 56 MG/DL (ref 65–99)
GLUCOSE BLD-MCNC: 84 MG/DL (ref 65–99)
GLUCOSE BLD-MCNC: 85 MG/DL (ref 65–99)
GLUCOSE BLD-MCNC: 91 MG/DL (ref 65–99)
GLUCOSE SERPL-MCNC: 93 MG/DL (ref 65–99)
HCT VFR BLD AUTO: 38.5 % (ref 37–47)
HGB BLD-MCNC: 12 G/DL (ref 12–16)
IMM GRANULOCYTES # BLD AUTO: 0.02 K/UL (ref 0–0.11)
IMM GRANULOCYTES NFR BLD AUTO: 0.3 % (ref 0–0.9)
LYMPHOCYTES # BLD AUTO: 0.77 K/UL (ref 1–4.8)
LYMPHOCYTES NFR BLD: 13.4 % (ref 22–41)
MCH RBC QN AUTO: 24.6 PG (ref 27–33)
MCHC RBC AUTO-ENTMCNC: 31.2 G/DL (ref 33.6–35)
MCV RBC AUTO: 78.9 FL (ref 81.4–97.8)
MONOCYTES # BLD AUTO: 0.57 K/UL (ref 0–0.85)
MONOCYTES NFR BLD AUTO: 9.9 % (ref 0–13.4)
NEUTROPHILS # BLD AUTO: 4.3 K/UL (ref 2–7.15)
NEUTROPHILS NFR BLD: 74.9 % (ref 44–72)
NRBC # BLD AUTO: 0 K/UL
NRBC BLD-RTO: 0 /100 WBC
PLATELET # BLD AUTO: 228 K/UL (ref 164–446)
PMV BLD AUTO: 9.7 FL (ref 9–12.9)
POTASSIUM SERPL-SCNC: 3.2 MMOL/L (ref 3.6–5.5)
PROT SERPL-MCNC: 7.6 G/DL (ref 6–8.2)
RBC # BLD AUTO: 4.88 M/UL (ref 4.2–5.4)
SODIUM SERPL-SCNC: 129 MMOL/L (ref 135–145)
WBC # BLD AUTO: 5.8 K/UL (ref 4.8–10.8)

## 2019-03-11 PROCEDURE — A9270 NON-COVERED ITEM OR SERVICE: HCPCS | Performed by: HOSPITALIST

## 2019-03-11 PROCEDURE — 700101 HCHG RX REV CODE 250

## 2019-03-11 PROCEDURE — 0K9N3ZX DRAINAGE OF RIGHT HIP MUSCLE, PERCUTANEOUS APPROACH, DIAGNOSTIC: ICD-10-PCS | Performed by: RADIOLOGY

## 2019-03-11 PROCEDURE — 700102 HCHG RX REV CODE 250 W/ 637 OVERRIDE(OP): Performed by: HOSPITALIST

## 2019-03-11 PROCEDURE — 700111 HCHG RX REV CODE 636 W/ 250 OVERRIDE (IP)

## 2019-03-11 PROCEDURE — 770004 HCHG ROOM/CARE - ONCOLOGY PRIVATE *

## 2019-03-11 PROCEDURE — 87070 CULTURE OTHR SPECIMN AEROBIC: CPT

## 2019-03-11 PROCEDURE — 36415 COLL VENOUS BLD VENIPUNCTURE: CPT

## 2019-03-11 PROCEDURE — 77012 CT SCAN FOR NEEDLE BIOPSY: CPT | Mod: RT

## 2019-03-11 PROCEDURE — 700105 HCHG RX REV CODE 258: Performed by: HOSPITALIST

## 2019-03-11 PROCEDURE — 700105 HCHG RX REV CODE 258: Performed by: INTERNAL MEDICINE

## 2019-03-11 PROCEDURE — 160002 HCHG RECOVERY MINUTES (STAT)

## 2019-03-11 PROCEDURE — 700111 HCHG RX REV CODE 636 W/ 250 OVERRIDE (IP): Performed by: HOSPITALIST

## 2019-03-11 PROCEDURE — 82962 GLUCOSE BLOOD TEST: CPT | Mod: 91

## 2019-03-11 PROCEDURE — 99232 SBSQ HOSP IP/OBS MODERATE 35: CPT | Performed by: HOSPITALIST

## 2019-03-11 PROCEDURE — 80053 COMPREHEN METABOLIC PANEL: CPT

## 2019-03-11 PROCEDURE — 700111 HCHG RX REV CODE 636 W/ 250 OVERRIDE (IP): Performed by: RADIOLOGY

## 2019-03-11 PROCEDURE — A9270 NON-COVERED ITEM OR SERVICE: HCPCS | Performed by: INTERNAL MEDICINE

## 2019-03-11 PROCEDURE — 87205 SMEAR GRAM STAIN: CPT

## 2019-03-11 PROCEDURE — 700102 HCHG RX REV CODE 250 W/ 637 OVERRIDE(OP): Performed by: INTERNAL MEDICINE

## 2019-03-11 PROCEDURE — 85025 COMPLETE CBC W/AUTO DIFF WBC: CPT

## 2019-03-11 RX ORDER — SODIUM CHLORIDE 9 MG/ML
500 INJECTION, SOLUTION INTRAVENOUS
Status: DISCONTINUED | OUTPATIENT
Start: 2019-03-11 | End: 2019-03-11 | Stop reason: HOSPADM

## 2019-03-11 RX ORDER — ONDANSETRON 2 MG/ML
4 INJECTION INTRAMUSCULAR; INTRAVENOUS PRN
Status: DISCONTINUED | OUTPATIENT
Start: 2019-03-11 | End: 2019-03-11 | Stop reason: HOSPADM

## 2019-03-11 RX ORDER — ONDANSETRON 2 MG/ML
INJECTION INTRAMUSCULAR; INTRAVENOUS
Status: COMPLETED
Start: 2019-03-11 | End: 2019-03-11

## 2019-03-11 RX ORDER — MIDAZOLAM HYDROCHLORIDE 1 MG/ML
.5-2 INJECTION INTRAMUSCULAR; INTRAVENOUS PRN
Status: DISCONTINUED | OUTPATIENT
Start: 2019-03-11 | End: 2019-03-11 | Stop reason: HOSPADM

## 2019-03-11 RX ORDER — LIDOCAINE HYDROCHLORIDE 20 MG/ML
INJECTION, SOLUTION INFILTRATION; PERINEURAL
Status: COMPLETED
Start: 2019-03-11 | End: 2019-03-11

## 2019-03-11 RX ORDER — MIDAZOLAM HYDROCHLORIDE 1 MG/ML
INJECTION INTRAMUSCULAR; INTRAVENOUS
Status: COMPLETED
Start: 2019-03-11 | End: 2019-03-11

## 2019-03-11 RX ORDER — SODIUM CHLORIDE 9 MG/ML
500 INJECTION, SOLUTION INTRAVENOUS ONCE
Status: COMPLETED | OUTPATIENT
Start: 2019-03-11 | End: 2019-03-11

## 2019-03-11 RX ORDER — POTASSIUM CHLORIDE 20 MEQ/1
40 TABLET, EXTENDED RELEASE ORAL DAILY
Status: DISCONTINUED | OUTPATIENT
Start: 2019-03-11 | End: 2019-03-16

## 2019-03-11 RX ADMIN — FENTANYL CITRATE 50 MCG: 50 INJECTION, SOLUTION INTRAMUSCULAR; INTRAVENOUS at 17:28

## 2019-03-11 RX ADMIN — OXYCODONE HYDROCHLORIDE 5 MG: 5 TABLET ORAL at 15:30

## 2019-03-11 RX ADMIN — SENNOSIDES AND DOCUSATE SODIUM 2 TABLET: 8.6; 5 TABLET ORAL at 05:18

## 2019-03-11 RX ADMIN — ONDANSETRON 4 MG: 2 INJECTION INTRAMUSCULAR; INTRAVENOUS at 17:05

## 2019-03-11 RX ADMIN — OXYCODONE HYDROCHLORIDE 5 MG: 5 TABLET ORAL at 23:13

## 2019-03-11 RX ADMIN — FENTANYL CITRATE 50 MCG: 50 INJECTION, SOLUTION INTRAMUSCULAR; INTRAVENOUS at 17:38

## 2019-03-11 RX ADMIN — PRAMIPEXOLE DIHYDROCHLORIDE 0.25 MG: 0.25 TABLET ORAL at 12:57

## 2019-03-11 RX ADMIN — GABAPENTIN 300 MG: 300 CAPSULE ORAL at 05:19

## 2019-03-11 RX ADMIN — PIPERACILLIN AND TAZOBACTAM 3.38 G: 3; .375 INJECTION, POWDER, LYOPHILIZED, FOR SOLUTION INTRAVENOUS; PARENTERAL at 12:58

## 2019-03-11 RX ADMIN — SODIUM CHLORIDE 500 ML: 9 INJECTION, SOLUTION INTRAVENOUS at 02:36

## 2019-03-11 RX ADMIN — FENTANYL CITRATE 50 MCG: 50 INJECTION, SOLUTION INTRAMUSCULAR; INTRAVENOUS at 17:44

## 2019-03-11 RX ADMIN — MIDAZOLAM 1 MG: 1 INJECTION INTRAMUSCULAR; INTRAVENOUS at 17:14

## 2019-03-11 RX ADMIN — ACETAMINOPHEN 650 MG: 325 TABLET, FILM COATED ORAL at 23:41

## 2019-03-11 RX ADMIN — MIDAZOLAM 1 MG: 1 INJECTION INTRAMUSCULAR; INTRAVENOUS at 17:34

## 2019-03-11 RX ADMIN — FENTANYL CITRATE 50 MCG: 50 INJECTION, SOLUTION INTRAMUSCULAR; INTRAVENOUS at 17:22

## 2019-03-11 RX ADMIN — LIDOCAINE HYDROCHLORIDE 8 ML: 20 INJECTION, SOLUTION INFILTRATION; PERINEURAL at 17:45

## 2019-03-11 RX ADMIN — OXYCODONE HYDROCHLORIDE 2.5 MG: 5 TABLET ORAL at 11:42

## 2019-03-11 RX ADMIN — POTASSIUM CHLORIDE 40 MEQ: 1500 TABLET, EXTENDED RELEASE ORAL at 05:19

## 2019-03-11 RX ADMIN — LOSARTAN POTASSIUM 50 MG: 50 TABLET ORAL at 05:19

## 2019-03-11 RX ADMIN — OXYCODONE HYDROCHLORIDE 2.5 MG: 5 TABLET ORAL at 01:40

## 2019-03-11 RX ADMIN — FUROSEMIDE 40 MG: 40 TABLET ORAL at 05:19

## 2019-03-11 RX ADMIN — PIPERACILLIN AND TAZOBACTAM 3.38 G: 3; .375 INJECTION, POWDER, LYOPHILIZED, FOR SOLUTION INTRAVENOUS; PARENTERAL at 05:18

## 2019-03-11 RX ADMIN — OMEPRAZOLE 20 MG: 20 CAPSULE, DELAYED RELEASE ORAL at 05:19

## 2019-03-11 RX ADMIN — PIPERACILLIN AND TAZOBACTAM 3.38 G: 3; .375 INJECTION, POWDER, LYOPHILIZED, FOR SOLUTION INTRAVENOUS; PARENTERAL at 23:12

## 2019-03-11 RX ADMIN — OXYCODONE HYDROCHLORIDE 2.5 MG: 5 TABLET ORAL at 05:18

## 2019-03-11 ASSESSMENT — ENCOUNTER SYMPTOMS
PALPITATIONS: 0
CHILLS: 0
EYE PAIN: 0
SORE THROAT: 0
SPEECH CHANGE: 0
COUGH: 0
EYE REDNESS: 0
LOSS OF CONSCIOUSNESS: 0
SHORTNESS OF BREATH: 0
BRUISES/BLEEDS EASILY: 0
BLOOD IN STOOL: 0
FOCAL WEAKNESS: 0
NERVOUS/ANXIOUS: 1
HALLUCINATIONS: 0
HEMOPTYSIS: 0
EYE DISCHARGE: 0
ABDOMINAL PAIN: 0
VOMITING: 0
FEVER: 0
STRIDOR: 0
BACK PAIN: 1
SEIZURES: 0
SPUTUM PRODUCTION: 0
DIARRHEA: 0
MYALGIAS: 0
FLANK PAIN: 0

## 2019-03-11 NOTE — PROGRESS NOTES
Pt is confused, answering questions intermittently. RR 22, . Notified MD. IV fluids ordered, to run until 2359. Monitor pt vitals.

## 2019-03-11 NOTE — PROGRESS NOTES
ilya from Lab called with critical result of wbc 0.2 plt 39 at . Critical lab result read back to ilya.   This critical lab result is within parameters established by Dr.renown policy for this patient

## 2019-03-11 NOTE — PROGRESS NOTES
Updated MD on pt mentation and VS. 500mL bolus ordered. Pt K 3.2, notified MD, potassium chloride ordered.

## 2019-03-11 NOTE — ASSESSMENT & PLAN NOTE
History of deep vein thrombosis   Was on Rivaroxaban as outpatient.     Continues to be on hold as interventions often with drain placement/removal  Start prophylactic lovenox once no further interventions needed.

## 2019-03-11 NOTE — PROGRESS NOTES
Received report and assumed patient care at change of shift. Patient is resting in bed, A&Ox1. Patient reports no pain or nausea. Plan of care discussed, questions answered. Bed is in the lowest position and locked, call light within reach, non-skid socks in place, hourly rounding. Patient reports no further needs and this time.

## 2019-03-11 NOTE — THERAPY
Occupational Therapy Contact Note:    OT order received and acknowledged, pt is awaiting biopsy for R hip. Will hold until POC is established.    Jaimee Alfred, OTR/L  Pager: [phone]

## 2019-03-11 NOTE — THERAPY
PT order received; Pt is currently awaiting for biopsy of R hip, will hold until further plan of care has been established.

## 2019-03-11 NOTE — PROGRESS NOTES
Hospital Medicine Daily Progress Note    Date of Service  3/11/2019    Chief Complaint  Right hip and back pain    Hospital Course      68-year-old female past medical history of diabetes, hypertension, history of deep vein thrombosis on her rivaroxaban, and remote history of breast cancer.  Transferred from [place] for concern for metastasis to the brain and the right iliopsoas, gluteal abscess concerning for infection versus malignancy. Oncology consulted, recommended IR - biopsy right psoas.          Interval Problem Update  The is anxious about her biopsy today  Still having pain that is worse with movement   Saturating 1-2 L of oxygen  Blood pressure better controlled today.  Hypokalemia 3.2, I am giving an additional replacement dose. I will check Mg level   DM well controlled, sugars running 84-91  Continue to hold Rivaroxaban for planned Bx    Discussed with patient, oncology, patient's nurse .      Consultants/Specialty  Oncology Dr Black    Code Status  FULL     Disposition  TBD     Review of Systems  Review of Systems   Constitutional: Negative for chills and fever.   HENT: Negative for congestion and sore throat.    Eyes: Negative for pain, discharge and redness.   Respiratory: Negative for cough, hemoptysis, sputum production, shortness of breath and stridor.    Cardiovascular: Negative for chest pain, palpitations and leg swelling.   Gastrointestinal: Negative for abdominal pain, blood in stool, diarrhea and vomiting.   Genitourinary: Negative for flank pain, hematuria and urgency.   Musculoskeletal: Positive for back pain and joint pain. Negative for myalgias.        Back and right hip    Skin: Negative for itching.   Neurological: Negative for speech change, focal weakness, seizures and loss of consciousness.   Endo/Heme/Allergies: Does not bruise/bleed easily.   Psychiatric/Behavioral: Negative for hallucinations and suicidal ideas. The patient is nervous/anxious.         Physical Exam  Temp:   [37.1 °C (98.7 °F)-37.7 °C (99.9 °F)] 37.1 °C (98.7 °F)  Pulse:  [] 97  Resp:  [16-22] 17  BP: (141-151)/(68-83) 143/73  SpO2:  [92 %-96 %] 93 %    Physical Exam   Constitutional: She is oriented to person, place, and time. She appears well-developed and well-nourished. No distress.   HENT:   Head: Normocephalic and atraumatic.   Right Ear: External ear normal.   Left Ear: External ear normal.   Eyes: Pupils are equal, round, and reactive to light. Conjunctivae are normal. Right eye exhibits no discharge. Left eye exhibits no discharge.   Neck: Normal range of motion. Neck supple. No JVD present. No tracheal deviation present. No thyromegaly present.   Cardiovascular: Regular rhythm.  Exam reveals no gallop and no friction rub.    No murmur heard.  Tachy    Pulmonary/Chest: Effort normal and breath sounds normal. No stridor. No respiratory distress. She has no wheezes. She has no rales. She exhibits no tenderness.   Abdominal: Soft. Bowel sounds are normal. She exhibits no distension. There is no tenderness. There is no rebound and no guarding.   Musculoskeletal: Normal range of motion. She exhibits edema (mild ) and tenderness (back and right hip ). She exhibits no deformity.   Neurological: She is alert and oriented to person, place, and time. No cranial nerve deficit. Coordination normal.   Skin: Skin is warm and dry. No rash noted. She is not diaphoretic. No erythema. No pallor.   Psychiatric: She has a normal mood and affect. Judgment normal.   Nursing note and vitals reviewed.      Fluids    Intake/Output Summary (Last 24 hours) at 03/11/19 1644  Last data filed at 03/11/19 1258   Gross per 24 hour   Intake              100 ml   Output             1650 ml   Net            -1550 ml       Laboratory  Recent Labs      03/09/19   0021  03/10/19   0834  03/11/19   0032   WBC  8.0  5.6  5.8   RBC  4.52  4.51  4.88   HEMOGLOBIN  10.9*  11.1*  12.0   HEMATOCRIT  36.3*  36.2*  38.5   MCV  80.3*  80.3*  78.9*    MCH  24.1*  24.6*  24.6*   MCHC  30.0*  30.7*  31.2*   RDW  53.8*  52.9*  51.1*   PLATELETCT  222  195  228   MPV  9.8  10.2  9.7     Recent Labs      03/09/19   0021  03/10/19   0834  03/11/19   0032   SODIUM  131*  132*  129*   POTASSIUM  3.4*  3.4*  3.2*   CHLORIDE  103  100  95*   CO2  23  24  25   GLUCOSE  104*  92  93   BUN  11  9  10   CREATININE  0.63  0.57  0.60   CALCIUM  9.3  8.9  9.2     Recent Labs      03/10/19   0834   APTT  37.3*   INR  1.07               Imaging  OUTSIDE IMAGES-CT CHEST/ABDOMEN/PELVIS   Final Result      OUTSIDE IMAGES-DX CHEST   Final Result      [id number] FILM X-RAY   Final Result      OUTSIDE IMAGES-MR BRAIN   Final Result      OUTSIDE IMAGES-MR BRAIN   Final Result      CT-NEEDLE BX-MUSCLE RIGHT    (Results Pending)        Assessment/Plan  * Lesion of brain   Assessment & Plan    Likely metastatic cancer, ? Breast   Consulted Dr Black for brain mets, recommending IR Bx of psoas muscle   IR Bx of psoas muscle ordered, pending      Encephalopathy   Assessment & Plan    Likely toxic metabolic, and structural from metastasis   Multifactorial pain, pain medications, dehydration, infection    Improved, on 3/9-10/2019 she was alert and oriented X3-4        Mass of iliopsoas muscle group   Assessment & Plan    Consulted Dr Black for brain mets, recommending IR Bx of psoas muscle   IR Bx of psoas muscle ordered    Can not r/o infection, abscess, continue antibiotics       Anemia   Assessment & Plan    Continue to monitor, transfuse for hemoglobin less than 7     RLS (restless legs syndrome)- (present on admission)   Assessment & Plan    Pramipexole       Diabetes mellitus type 2 in obese (HCC)- (present on admission)   Assessment & Plan    Glycated hemoglobin 6.3, 3 months ago  Well controlled   Short acting insulin as needed   Accu-Checks, hypoglycemia protocol     Consider stopping accu-checks after Bx if still good control      History of breast cancer- (present on  admission)   Assessment & Plan    Questionable metastasis to the brain  Oncology consulted, pending biopsy     COPD (chronic obstructive pulmonary disease) (HCC)- (present on admission)   Assessment & Plan    Oxygen as needed, Respiratory protocol, Bronchodilators, Incentive spirometry      History of deep vein thrombosis- (present on admission)   Assessment & Plan    History of deep vein thrombosis   Was on Rivaroxaban as outpatient.     Holding for planned Bx Mar 11-12     Essential hypertension- (present on admission)   Assessment & Plan    Restarted on losartan and furosemide with hold parameters.     GERD (gastroesophageal reflux disease)- (present on admission)   Assessment & Plan    Omeprazole     Chronic pain- (present on admission)   Assessment & Plan     Multifactorial  Likely secondary to metastatic cancer             VTE prophylaxis: Was on Rivaroxaban as outpatient. Holding for planned Bx Mar 11-12.

## 2019-03-11 NOTE — DIETARY
Nutrition services: Day 3 of admit.  Muriel Watkins is a 68 y.o. female with admitting DX of metastatic breast cancer w/ multiple brain lesions and COPD w/ known past medical hx significant for HTN, hyperlipidemia, GERD, and DM-type II per H&P. Consult received for per nutrition admit screen - pt w/ inadequate PO intake and unintentional wt loss PTA. Pt currently NPO at this time 2' pending biopsy and CT scan. Pt's current wt status in chart is a stated weight, spoke to RN regarding obtaining a measured wt to better assess changes in weight. Noted per chart review - pt was ~77.8kg (172#) x 6 months ago; therefore, may have experienced an 8# wt gain rather than wt loss. Pt also previously on diabetic diet w/ fluctuating 25-75% PO of meals.     Assessment:  Height: 152.4 cm (5')  Weight: 81.6 kg (180 lb)  Body mass index is 35.15 kg/m².  Diet/Intake: NPO x1 day     Evaluation:   1. Pt NPO at this time 2' pending CT scan and biopsy of R hip per MD.   2. Noted current wt in chart is stated, therefore, unable to correctly assess weight status, per chart review, pt may have experienced an 8# (4.7%) wt gain x 6 months.   3. Last BM 3/10.   4. No documented skin issues.    Malnutrition Risk: n/a     Recommendations/Plan:  1. As soon as medically feasible ADAT back to diabetic diet order.    2. Encourage intake of meals.   3. Document intake  as % taken in ADL's to provide interdisciplinary communication across all shifts.   4. Obtain new weight measurement, and monitor weight.  5. Nutrition rep will continue to see patient for ongoing meal and snack preferences.

## 2019-03-12 LAB
ALBUMIN SERPL BCP-MCNC: 3.4 G/DL (ref 3.2–4.9)
ALBUMIN/GLOB SERPL: 0.8 G/DL
ALP SERPL-CCNC: 417 U/L (ref 30–99)
ALT SERPL-CCNC: 18 U/L (ref 2–50)
ANION GAP SERPL CALC-SCNC: 12 MMOL/L (ref 0–11.9)
AST SERPL-CCNC: 42 U/L (ref 12–45)
BASOPHILS # BLD AUTO: 0.6 % (ref 0–1.8)
BASOPHILS # BLD: 0.04 K/UL (ref 0–0.12)
BILIRUB SERPL-MCNC: 0.7 MG/DL (ref 0.1–1.5)
BUN SERPL-MCNC: 12 MG/DL (ref 8–22)
CALCIUM SERPL-MCNC: 9.8 MG/DL (ref 8.5–10.5)
CHLORIDE SERPL-SCNC: 96 MMOL/L (ref 96–112)
CO2 SERPL-SCNC: 24 MMOL/L (ref 20–33)
CREAT SERPL-MCNC: 0.59 MG/DL (ref 0.5–1.4)
EOSINOPHIL # BLD AUTO: 0.05 K/UL (ref 0–0.51)
EOSINOPHIL NFR BLD: 0.7 % (ref 0–6.9)
ERYTHROCYTE [DISTWIDTH] IN BLOOD BY AUTOMATED COUNT: 53.2 FL (ref 35.9–50)
GLOBULIN SER CALC-MCNC: 4.3 G/DL (ref 1.9–3.5)
GLUCOSE BLD-MCNC: 105 MG/DL (ref 65–99)
GLUCOSE BLD-MCNC: 106 MG/DL (ref 65–99)
GLUCOSE BLD-MCNC: 109 MG/DL (ref 65–99)
GLUCOSE BLD-MCNC: 109 MG/DL (ref 65–99)
GLUCOSE SERPL-MCNC: 99 MG/DL (ref 65–99)
GRAM STN SPEC: NORMAL
HCT VFR BLD AUTO: 39.7 % (ref 37–47)
HGB BLD-MCNC: 12.3 G/DL (ref 12–16)
IMM GRANULOCYTES # BLD AUTO: 0.03 K/UL (ref 0–0.11)
IMM GRANULOCYTES NFR BLD AUTO: 0.4 % (ref 0–0.9)
LYMPHOCYTES # BLD AUTO: 0.61 K/UL (ref 1–4.8)
LYMPHOCYTES NFR BLD: 8.5 % (ref 22–41)
MCH RBC QN AUTO: 25.1 PG (ref 27–33)
MCHC RBC AUTO-ENTMCNC: 31 G/DL (ref 33.6–35)
MCV RBC AUTO: 80.9 FL (ref 81.4–97.8)
MONOCYTES # BLD AUTO: 0.6 K/UL (ref 0–0.85)
MONOCYTES NFR BLD AUTO: 8.4 % (ref 0–13.4)
NEUTROPHILS # BLD AUTO: 5.83 K/UL (ref 2–7.15)
NEUTROPHILS NFR BLD: 81.4 % (ref 44–72)
NRBC # BLD AUTO: 0 K/UL
NRBC BLD-RTO: 0 /100 WBC
PLATELET # BLD AUTO: 246 K/UL (ref 164–446)
PMV BLD AUTO: 9.8 FL (ref 9–12.9)
POTASSIUM SERPL-SCNC: 3.6 MMOL/L (ref 3.6–5.5)
PROT SERPL-MCNC: 7.7 G/DL (ref 6–8.2)
RBC # BLD AUTO: 4.91 M/UL (ref 4.2–5.4)
SIGNIFICANT IND 70042: NORMAL
SITE SITE: NORMAL
SODIUM SERPL-SCNC: 132 MMOL/L (ref 135–145)
SOURCE SOURCE: NORMAL
WBC # BLD AUTO: 7.2 K/UL (ref 4.8–10.8)

## 2019-03-12 PROCEDURE — 700105 HCHG RX REV CODE 258: Performed by: HOSPITALIST

## 2019-03-12 PROCEDURE — 770004 HCHG ROOM/CARE - ONCOLOGY PRIVATE *

## 2019-03-12 PROCEDURE — A9270 NON-COVERED ITEM OR SERVICE: HCPCS | Performed by: HOSPITALIST

## 2019-03-12 PROCEDURE — 99233 SBSQ HOSP IP/OBS HIGH 50: CPT | Performed by: INTERNAL MEDICINE

## 2019-03-12 PROCEDURE — 97165 OT EVAL LOW COMPLEX 30 MIN: CPT

## 2019-03-12 PROCEDURE — 80053 COMPREHEN METABOLIC PANEL: CPT

## 2019-03-12 PROCEDURE — 82962 GLUCOSE BLOOD TEST: CPT | Mod: 91

## 2019-03-12 PROCEDURE — 700102 HCHG RX REV CODE 250 W/ 637 OVERRIDE(OP): Performed by: HOSPITALIST

## 2019-03-12 PROCEDURE — 85025 COMPLETE CBC W/AUTO DIFF WBC: CPT

## 2019-03-12 PROCEDURE — 97535 SELF CARE MNGMENT TRAINING: CPT

## 2019-03-12 PROCEDURE — 36415 COLL VENOUS BLD VENIPUNCTURE: CPT

## 2019-03-12 PROCEDURE — A9270 NON-COVERED ITEM OR SERVICE: HCPCS | Performed by: INTERNAL MEDICINE

## 2019-03-12 PROCEDURE — 700102 HCHG RX REV CODE 250 W/ 637 OVERRIDE(OP): Performed by: INTERNAL MEDICINE

## 2019-03-12 PROCEDURE — 87040 BLOOD CULTURE FOR BACTERIA: CPT | Mod: 91

## 2019-03-12 PROCEDURE — 700111 HCHG RX REV CODE 636 W/ 250 OVERRIDE (IP): Performed by: HOSPITALIST

## 2019-03-12 PROCEDURE — 97162 PT EVAL MOD COMPLEX 30 MIN: CPT

## 2019-03-12 RX ADMIN — GABAPENTIN 300 MG: 300 CAPSULE ORAL at 18:12

## 2019-03-12 RX ADMIN — PIPERACILLIN AND TAZOBACTAM 3.38 G: 3; .375 INJECTION, POWDER, LYOPHILIZED, FOR SOLUTION INTRAVENOUS; PARENTERAL at 05:38

## 2019-03-12 RX ADMIN — LOSARTAN POTASSIUM 50 MG: 50 TABLET ORAL at 05:38

## 2019-03-12 RX ADMIN — TRAMADOL HYDROCHLORIDE 50 MG: 50 TABLET, COATED ORAL at 22:30

## 2019-03-12 RX ADMIN — GABAPENTIN 300 MG: 300 CAPSULE ORAL at 06:00

## 2019-03-12 RX ADMIN — TRAMADOL HYDROCHLORIDE 50 MG: 50 TABLET, COATED ORAL at 14:32

## 2019-03-12 RX ADMIN — OXYCODONE HYDROCHLORIDE 5 MG: 5 TABLET ORAL at 03:56

## 2019-03-12 RX ADMIN — POTASSIUM CHLORIDE 40 MEQ: 1500 TABLET, EXTENDED RELEASE ORAL at 05:37

## 2019-03-12 RX ADMIN — POLYETHYLENE GLYCOL 3350 1 PACKET: 17 POWDER, FOR SOLUTION ORAL at 05:38

## 2019-03-12 RX ADMIN — OXYCODONE HYDROCHLORIDE 5 MG: 5 TABLET ORAL at 08:03

## 2019-03-12 RX ADMIN — OMEPRAZOLE 20 MG: 20 CAPSULE, DELAYED RELEASE ORAL at 05:37

## 2019-03-12 RX ADMIN — SENNOSIDES AND DOCUSATE SODIUM 2 TABLET: 8.6; 5 TABLET ORAL at 05:37

## 2019-03-12 RX ADMIN — PIPERACILLIN AND TAZOBACTAM 3.38 G: 3; .375 INJECTION, POWDER, LYOPHILIZED, FOR SOLUTION INTRAVENOUS; PARENTERAL at 21:08

## 2019-03-12 RX ADMIN — OXYCODONE HYDROCHLORIDE 2.5 MG: 5 TABLET ORAL at 19:55

## 2019-03-12 RX ADMIN — FUROSEMIDE 40 MG: 40 TABLET ORAL at 05:37

## 2019-03-12 RX ADMIN — FUROSEMIDE 40 MG: 40 TABLET ORAL at 18:12

## 2019-03-12 RX ADMIN — PIPERACILLIN AND TAZOBACTAM 3.38 G: 3; .375 INJECTION, POWDER, LYOPHILIZED, FOR SOLUTION INTRAVENOUS; PARENTERAL at 14:22

## 2019-03-12 ASSESSMENT — COGNITIVE AND FUNCTIONAL STATUS - GENERAL
SUGGESTED CMS G CODE MODIFIER MOBILITY: CK
MOVING TO AND FROM BED TO CHAIR: A LITTLE
CLIMB 3 TO 5 STEPS WITH RAILING: TOTAL
TURNING FROM BACK TO SIDE WHILE IN FLAT BAD: A LITTLE
WALKING IN HOSPITAL ROOM: A LOT
MOBILITY SCORE: 15
STANDING UP FROM CHAIR USING ARMS: A LITTLE
MOVING FROM LYING ON BACK TO SITTING ON SIDE OF FLAT BED: A LITTLE

## 2019-03-12 ASSESSMENT — ENCOUNTER SYMPTOMS
WEAKNESS: 0
ABDOMINAL PAIN: 0
CONSTIPATION: 0
DIZZINESS: 0
SPEECH CHANGE: 0
HEARTBURN: 0
NERVOUS/ANXIOUS: 0
CHILLS: 0
PHOTOPHOBIA: 0
DEPRESSION: 0
DIARRHEA: 0
SENSORY CHANGE: 0
CLAUDICATION: 0
BLURRED VISION: 0
NAUSEA: 0
SHORTNESS OF BREATH: 0
INSOMNIA: 0
COUGH: 0
SORE THROAT: 0
FEVER: 0
MYALGIAS: 1
HEADACHES: 0
VOMITING: 0

## 2019-03-12 ASSESSMENT — GAIT ASSESSMENTS
DISTANCE (FEET): 15
DEVIATION: STEP TO;DECREASED BASE OF SUPPORT;BRADYKINETIC;DECREASED HEEL STRIKE;DECREASED TOE OFF;OTHER (COMMENT)
GAIT LEVEL OF ASSIST: MINIMAL ASSIST
ASSISTIVE DEVICE: FRONT WHEEL WALKER

## 2019-03-12 NOTE — PROGRESS NOTES
Right gluteal lesion aspiration performed by Dr Prado. Procedure BARs explained to patient by physician and consent obtained. Patient assisted to prone position on table. Patient was continuously assessed and monitored throughout procedure; ETCO2 33-39 with consistent waveform. Lesion was found to be liquid, so aspirate performed and 10 ml thick, malodorous fluid aspirated and sent to lab for culture. Puncture site CDI and guaze/tegaderm dressing applied. Patient tolerated procedure quite well and was transferred to PPU for recovery. Bedside handoff to Cyndie NGUYEN.

## 2019-03-12 NOTE — DISCHARGE PLANNING
"LSW spoke with patient's brother René ([phone]) to complete assessment. Patient was living with her spouse but is currently trying to file a divorce. Patient was living in [facility] but according to René pt can not return home and would either need to d/c to another sibling's home. Per René, pt can not d/c to his apartment ([facility]). René is POA and would like pt to return to [place] and stated that he believes pt needs to d/c to SNF. Per René, pt was to d/c to SNF in [place] prior to her being transferred to [facility]. René stated he is willing to pay for transport for pt to d/c to SNF in the [facility] area as long as the cost is, \"not in the thousands.\" Per René, he is really pt's only support. René stated that he is looking into helping pt file for divorce and apply for Medi-yaritza. LSW states she would look into a possible Medi-yaritza screening for pt while at [facility]. René would like to remain updated on pt's POC.     Care Transition Team Assessment    Information Source  Orientation : Oriented x 4  Information Given By: Patient  Who is responsible for making decisions for patient? : Patient    Readmission Evaluation  Is this a readmission?: Yes - unplanned readmission    Elopement Risk  Legal Hold: No  Ambulatory or Self Mobile in Wheelchair: No-Not an Elopement Risk  Disoriented: No  Psychiatric Symptoms: None  History of Wandering: No  Elopement this Admit: No  Vocalizing Wanting to Leave: No  Displays Behaviors, Body Language Wanting to Leave: No-Not at Risk for Elopement  Elopement Risk: Not at Risk for Elopement    Interdisciplinary Discharge Planning  Does Admitting Nurse Feel This Could be a Complex Discharge?: Yes  Lives with - Patient's Self Care Capacity: Spouse  Patient or legal guardian wants to designate a caregiver (see row info): No  Support Systems: Family Member(s)  Housing / Facility: 2 Story House  Do You Take your Prescribed Medications Regularly: Yes  Able to Return to Previous ADL's: " Future Time w/Therapy  Mobility Issues: Yes  Prior Services: None  Assistance Needed: Unknown at this Time    Discharge Preparedness  What is your plan after discharge?: Uncertain - pending medical team collaboration, Skilled nursing facility  What are your discharge supports?: Sibling  Prior Functional Level: Needs Assist with Activities of Daily Living    Functional Assesment  Prior Functional Level: Needs Assist with Activities of Daily Living    Finances  Financial Barriers to Discharge: No  Prescription Coverage: No    Vision / Hearing Impairment  Vision Impairment : Yes  Right Eye Vision: Impaired, Wears Glasses  Left Eye Vision: Impaired, Wears Glasses  Hearing Impairment : No         Advance Directive  Advance Directive?: DPOA for Health Care, POLST  Durable Power of  Name and Contact :  (Arsh Tenoriosu [phone])    Domestic Abuse  Have you ever been the victim of abuse or violence?: Not Sure    Psychological Assessment  History of Substance Abuse: None  History of Psychiatric Problems: Yes (depression, anxiety)    Discharge Risks or Barriers  Discharge risks or barriers?: No PCP, Transportation, Lives alone, no community support  Patient risk factors: Lives alone and no community support, Vulnerable adult    Anticipated Discharge Information  Anticipated discharge disposition: Home, SNF

## 2019-03-12 NOTE — OR NURSING
1819 Pt report received from IR nurse, pt brought over by RN on [facility]. Pt placed on monitor, VSS, family at bedside. Pts incision site is clean, dry and soft, no S/S of bleeding.     1830 pt is resting comfortably in bed no C/O pain, VSS, incision site is clean dry and soft.     1919 Pt report given to accepting RN on floor, pt updated on transfer back to room. Site reviewed with accepting RN.

## 2019-03-12 NOTE — PROGRESS NOTES
"[facility] Medicine Daily Progress Note    Date of Service  3/12/2019    Chief Complaint  68 y.o. female admitted 3/8/2019 with right hip pain, fever and abnormal brain mri.    Hospital Course    Patient started on empiric antibiotics given fever.  She had abnormal findings on MRI brain and CT showed \"lesion\" on right iliopsoas.  This lesion was biopsied and turned out to be abscess.  She has history of breast cancer and there is also concern of brain lesions being metastatic though their appearance is not typical of this.      Interval Problem Update  Discussed with Dr Turner for second opinion of brain lesions and based on appearance not able to r/o or r/i any diagnosis.  Given patient's presentation of confusion, hip pain and fever  - this is more supportive of infectious etiology rather than malignant.  Fluid from right gluteal area sent to micro and as of yet - no growth.  Continue zosyn for now.      Consultants/Specialty  none    Code Status  full    Disposition  tbd    Review of Systems  Review of Systems   Constitutional: Negative for chills and fever.   HENT: Negative for congestion and sore throat.    Eyes: Negative for blurred vision and photophobia.   Respiratory: Negative for cough and shortness of breath.    Cardiovascular: Negative for chest pain, claudication and leg swelling.   Gastrointestinal: Negative for abdominal pain, constipation, diarrhea, heartburn, nausea and vomiting.   Genitourinary: Negative for dysuria and hematuria.   Musculoskeletal: Positive for myalgias (hip pain). Negative for joint pain (left hip pain).   Skin: Negative for itching and rash.   Neurological: Negative for dizziness, sensory change, speech change, weakness and headaches.   Psychiatric/Behavioral: Negative for depression. The patient is not nervous/anxious and does not have insomnia.         Physical Exam  Temp:  [36.4 °C (97.6 °F)-37.4 °C (99.4 °F)] 36.7 °C (98.1 °F)  Pulse:  [] 82  Resp:  [12-19] 18  BP: " (119-151)/(69-78) 119/69  SpO2:  [91 %-100 %] 99 %    Physical Exam   Constitutional: She is oriented to person, place, and time. She appears well-developed and well-nourished. No distress.   HENT:   Head: Normocephalic and atraumatic.   Eyes: Conjunctivae are normal. No scleral icterus.   Neck: Neck supple. No JVD present.   Cardiovascular: Normal rate, regular rhythm and normal heart sounds.  Exam reveals no gallop and no friction rub.    No murmur heard.  Pulmonary/Chest: Effort normal and breath sounds normal. No respiratory distress. She exhibits no tenderness.   Abdominal: Soft. Bowel sounds are normal. She exhibits no distension. There is no guarding.   Musculoskeletal: She exhibits no edema or tenderness.   Lymphadenopathy:     She has no cervical adenopathy.   Neurological: She is alert and oriented to person, place, and time. No cranial nerve deficit.   Skin: Skin is warm and dry. She is not diaphoretic. No erythema. No pallor.   Psychiatric: She has a normal mood and affect. Her behavior is normal.   Nursing note and vitals reviewed.      Fluids    Intake/Output Summary (Last 24 hours) at 03/12/19 1455  Last data filed at 03/12/19 0900   Gross per 24 hour   Intake              100 ml   Output              500 ml   Net             -400 ml       Laboratory  Recent Labs      03/10/19   0834  03/11/19   0032  03/12/19   0019   WBC  5.6  5.8  7.2   RBC  4.51  4.88  4.91   HEMOGLOBIN  11.1*  12.0  12.3   HEMATOCRIT  36.2*  38.5  39.7   MCV  80.3*  78.9*  80.9*   MCH  24.6*  24.6*  25.1*   MCHC  30.7*  31.2*  31.0*   RDW  52.9*  51.1*  53.2*   PLATELETCT  195  228  246   MPV  10.2  9.7  9.8     Recent Labs      03/10/19   0834  03/11/19   0032  03/12/19   0019   SODIUM  132*  129*  132*   POTASSIUM  3.4*  3.2*  3.6   CHLORIDE  100  95*  96   CO2  24  25  24   GLUCOSE  92  93  99   BUN  9  10  12   CREATININE  0.57  0.60  0.59   CALCIUM  8.9  9.2  9.8     Recent Labs      03/10/19   0834   APTT  37.3*   INR  1.07                Imaging  OUTSIDE IMAGES-CT CHEST/ABDOMEN/PELVIS   Final Result      OUTSIDE IMAGES-DX CHEST   Final Result      OH-BMHYUHU-[id number] FILM X-RAY   Final Result      OUTSIDE IMAGES-MR BRAIN   Final Result      OUTSIDE IMAGES-MR BRAIN   Final Result      CT-NEEDLE BX-MUSCLE RIGHT    (Results Pending)        Assessment/Plan  * Lesion of brain   Assessment & Plan    Metastatic cancer vs infectious etiology  D/w Dr Black for questionable brain mets, recommending IR Bx of psoas muscle   IR Bx of psoas muscle ordered - was abscess  Empiric treatment of infection vs superficial biopsy.       Encephalopathy   Assessment & Plan    Likely toxic metabolic from infection  Improved       Mass of iliopsoas muscle group   Assessment & Plan    Consulted Dr Black for brain mets, recommending IR Bx of psoas muscle   IR Bx of psoas muscle completed, fluid drained - suspected abscess  Continue abx pending culture.       Anemia   Assessment & Plan    Continue to monitor, transfuse for hemoglobin less than 7     RLS (restless legs syndrome)- (present on admission)   Assessment & Plan    Pramipexole       Diabetes mellitus type 2 in obese (HCC)- (present on admission)   Assessment & Plan    Glycated hemoglobin 6.3, 3 months ago  Well controlled   Short acting insulin as needed   Accu-Checks, hypoglycemia protocol     Consider stopping accu-checks after Bx if still good control      History of breast cancer- (present on admission)   Assessment & Plan    Mets vs infection to brain  No known active breast cancer.       COPD (chronic obstructive pulmonary disease) (HCC)- (present on admission)   Assessment & Plan    Oxygen as needed, Respiratory protocol, Bronchodilators, Incentive spirometry      History of deep vein thrombosis- (present on admission)   Assessment & Plan    History of deep vein thrombosis   Was on Rivaroxaban as outpatient.     Holding for planned Bx Mar 11-12     Essential hypertension- (present on admission)    Assessment & Plan    Restarted on losartan and furosemide with hold parameters.     GERD (gastroesophageal reflux disease)- (present on admission)   Assessment & Plan    Omeprazole     Chronic pain- (present on admission)   Assessment & Plan     Multifactorial  Likely secondary to metastatic cancer            VTE prophylaxis: scds

## 2019-03-12 NOTE — PROGRESS NOTES
Report received.  Assumed Care.  Pt in bed, R312. AOx3  Denies pain, SOB.  Plan of care discussed.  Explained importance of calling before getting OOB and pt verbalizes understanding.  Call light and belongings within reach, treaded slipper socks on, bed alarm in use, bed in lowest position.  Will monitor frequently.

## 2019-03-12 NOTE — PROGRESS NOTES
Received report from [facility], assumed care of pt 0700.  Pt is A&Ox4 at this time, sleepy but answering questions.  Medicated per MAR.   PIV infusing ABX.   Assessment complete, treaded socks on, all needs met at this time.   Dressing to left upper back from thoracentesis and right buttock from drainage and biopsy also CDI.   on, VSS.

## 2019-03-13 ENCOUNTER — APPOINTMENT (OUTPATIENT)
Dept: CARDIOLOGY | Facility: MEDICAL CENTER | Age: 69
DRG: 981 | End: 2019-03-13
Attending: INTERNAL MEDICINE
Payer: MEDICARE

## 2019-03-13 LAB
APPEARANCE UR: CLEAR
BACTERIA #/AREA URNS HPF: ABNORMAL /HPF
BILIRUB UR QL STRIP.AUTO: NEGATIVE
COLOR UR: YELLOW
EPI CELLS #/AREA URNS HPF: ABNORMAL /HPF
GLUCOSE BLD-MCNC: 104 MG/DL (ref 65–99)
GLUCOSE BLD-MCNC: 109 MG/DL (ref 65–99)
GLUCOSE UR STRIP.AUTO-MCNC: NEGATIVE MG/DL
KETONES UR STRIP.AUTO-MCNC: NEGATIVE MG/DL
LEUKOCYTE ESTERASE UR QL STRIP.AUTO: ABNORMAL
LV EJECT FRACT  99904: 65
LV EJECT FRACT MOD 2C 99903: 70.96
LV EJECT FRACT MOD 4C 99902: 64.37
LV EJECT FRACT MOD BP 99901: 64.93
MICRO URNS: ABNORMAL
NITRITE UR QL STRIP.AUTO: NEGATIVE
PH UR STRIP.AUTO: 5.5 [PH]
PROT UR QL STRIP: NEGATIVE MG/DL
RBC # URNS HPF: ABNORMAL /HPF
RBC UR QL AUTO: NEGATIVE
SP GR UR STRIP.AUTO: 1.01
UROBILINOGEN UR STRIP.AUTO-MCNC: 0.2 MG/DL
WBC #/AREA URNS HPF: ABNORMAL /HPF
YEAST BUDDING URNS QL: PRESENT /HPF

## 2019-03-13 PROCEDURE — 700105 HCHG RX REV CODE 258: Performed by: HOSPITALIST

## 2019-03-13 PROCEDURE — 700102 HCHG RX REV CODE 250 W/ 637 OVERRIDE(OP): Performed by: INTERNAL MEDICINE

## 2019-03-13 PROCEDURE — 700102 HCHG RX REV CODE 250 W/ 637 OVERRIDE(OP): Performed by: HOSPITALIST

## 2019-03-13 PROCEDURE — 93306 TTE W/DOPPLER COMPLETE: CPT | Mod: 26 | Performed by: INTERNAL MEDICINE

## 2019-03-13 PROCEDURE — 99223 1ST HOSP IP/OBS HIGH 75: CPT | Performed by: INTERNAL MEDICINE

## 2019-03-13 PROCEDURE — 700111 HCHG RX REV CODE 636 W/ 250 OVERRIDE (IP): Performed by: HOSPITALIST

## 2019-03-13 PROCEDURE — 700105 HCHG RX REV CODE 258: Performed by: INTERNAL MEDICINE

## 2019-03-13 PROCEDURE — 700111 HCHG RX REV CODE 636 W/ 250 OVERRIDE (IP): Performed by: INTERNAL MEDICINE

## 2019-03-13 PROCEDURE — 81001 URINALYSIS AUTO W/SCOPE: CPT

## 2019-03-13 PROCEDURE — 82962 GLUCOSE BLOOD TEST: CPT

## 2019-03-13 PROCEDURE — 99232 SBSQ HOSP IP/OBS MODERATE 35: CPT | Performed by: INTERNAL MEDICINE

## 2019-03-13 PROCEDURE — A9270 NON-COVERED ITEM OR SERVICE: HCPCS | Performed by: INTERNAL MEDICINE

## 2019-03-13 PROCEDURE — 93306 TTE W/DOPPLER COMPLETE: CPT

## 2019-03-13 PROCEDURE — 770004 HCHG ROOM/CARE - ONCOLOGY PRIVATE *

## 2019-03-13 PROCEDURE — A9270 NON-COVERED ITEM OR SERVICE: HCPCS | Performed by: HOSPITALIST

## 2019-03-13 RX ORDER — PRAMIPEXOLE DIHYDROCHLORIDE 0.25 MG/1
0.25 TABLET ORAL EVERY 8 HOURS PRN
Status: DISCONTINUED | OUTPATIENT
Start: 2019-03-13 | End: 2019-04-12

## 2019-03-13 RX ADMIN — POTASSIUM CHLORIDE 40 MEQ: 1500 TABLET, EXTENDED RELEASE ORAL at 05:00

## 2019-03-13 RX ADMIN — OMEPRAZOLE 20 MG: 20 CAPSULE, DELAYED RELEASE ORAL at 05:00

## 2019-03-13 RX ADMIN — PIPERACILLIN AND TAZOBACTAM 3.38 G: 3; .375 INJECTION, POWDER, LYOPHILIZED, FOR SOLUTION INTRAVENOUS; PARENTERAL at 05:00

## 2019-03-13 RX ADMIN — PRAMIPEXOLE DIHYDROCHLORIDE 0.25 MG: 0.25 TABLET ORAL at 05:02

## 2019-03-13 RX ADMIN — PRAMIPEXOLE DIHYDROCHLORIDE 0.25 MG: 0.25 TABLET ORAL at 17:57

## 2019-03-13 RX ADMIN — GABAPENTIN 300 MG: 300 CAPSULE ORAL at 05:00

## 2019-03-13 RX ADMIN — OXYCODONE HYDROCHLORIDE 5 MG: 5 TABLET ORAL at 19:36

## 2019-03-13 RX ADMIN — FUROSEMIDE 40 MG: 40 TABLET ORAL at 17:42

## 2019-03-13 RX ADMIN — OXYCODONE HYDROCHLORIDE 2.5 MG: 5 TABLET ORAL at 02:17

## 2019-03-13 RX ADMIN — GABAPENTIN 300 MG: 300 CAPSULE ORAL at 17:42

## 2019-03-13 RX ADMIN — SENNOSIDES AND DOCUSATE SODIUM 2 TABLET: 8.6; 5 TABLET ORAL at 05:00

## 2019-03-13 RX ADMIN — VANCOMYCIN HYDROCHLORIDE 2000 MG: 100 INJECTION, POWDER, LYOPHILIZED, FOR SOLUTION INTRAVENOUS at 13:37

## 2019-03-13 RX ADMIN — OXYCODONE HYDROCHLORIDE 5 MG: 5 TABLET ORAL at 23:58

## 2019-03-13 RX ADMIN — FUROSEMIDE 40 MG: 40 TABLET ORAL at 05:00

## 2019-03-13 RX ADMIN — LOSARTAN POTASSIUM 50 MG: 50 TABLET ORAL at 05:00

## 2019-03-13 RX ADMIN — ENOXAPARIN SODIUM 40 MG: 100 INJECTION SUBCUTANEOUS at 13:29

## 2019-03-13 RX ADMIN — TRAMADOL HYDROCHLORIDE 50 MG: 50 TABLET, COATED ORAL at 06:04

## 2019-03-13 RX ADMIN — TRAMADOL HYDROCHLORIDE 50 MG: 50 TABLET, COATED ORAL at 13:49

## 2019-03-13 RX ADMIN — VANCOMYCIN HYDROCHLORIDE 1000 MG: 100 INJECTION, POWDER, LYOPHILIZED, FOR SOLUTION INTRAVENOUS at 23:59

## 2019-03-13 ASSESSMENT — ENCOUNTER SYMPTOMS
COUGH: 0
BLURRED VISION: 0
ABDOMINAL PAIN: 0
PHOTOPHOBIA: 0
MYALGIAS: 1
SORE THROAT: 0
INSOMNIA: 0
CLAUDICATION: 0
FEVER: 0
HEADACHES: 0
SENSORY CHANGE: 0
NERVOUS/ANXIOUS: 0
CHILLS: 0
SPEECH CHANGE: 0
SHORTNESS OF BREATH: 0
DIARRHEA: 0
HEARTBURN: 0
CONSTIPATION: 0
WEAKNESS: 0
NAUSEA: 0
VOMITING: 0
DEPRESSION: 0
DIZZINESS: 0

## 2019-03-13 NOTE — PROGRESS NOTES
Patient's LOC changed throughout shift. She can be A&Ox4 at times and speak to what is going on, then can become incoherent and very drowsy while crying out in pain. Tramadol and heat packs in use for pain control. Pt appeared to be much more comfortable towards end of shift declining need for pain medication. Bed alarm on and frequent rounding on pt.

## 2019-03-13 NOTE — PROGRESS NOTES
Rec'd report & assumed care of pt at 0700. Assessment completed. Pt is A&Ox4, intermittent confusion, up x1 with FWW. Pt is on 3L O2 via nasal cannula. R PIV patent, running NS @ TKO. Pt denies any pain, n/v at this time. POC discussed & questions answered. Bed locked and in lowest position, call light within reach, non-skid socks in place, hourly rounding. Pt reports no further needs at this time, will continue to monitor.

## 2019-03-13 NOTE — THERAPY
"Occupational Therapy Evaluation completed.   Functional Status:  Pt drowsy on arrival & requires extra time to process simple commands.  Pt was Min A for supine to sit EOB.  Pt transferred to chair with Min A.  Pt able to amb to sink with FWW & Min A.  Pt stood for 9 min with CGA to groom.  Pt has very delayed responses.  Pt left sitting up in chair for lunch.  Plan of Care: Will benefit from Occupational Therapy 3 times per week  Discharge Recommendations:  Equipment: Will Continue to Assess for Equipment Needs. Post-acute therapy Discharge to a transitional care facility for continued skilled therapy services.    See \"Rehab Therapy-Acute\" Patient Summary Report for complete documentation.    "

## 2019-03-13 NOTE — PROGRESS NOTES
Spiritual Care Note      Patient Information     Patient's Name: Muriel Watkins   MRN: [id number]    YOB: 1950   Age and Gender: 68 y.o. female   Room (and Bed): R312   Ethnicity or Nationality:     Primary Language: English and Faroese   Yarsanism/Spiritual Preference: unknown   Place of Residence: [place], [place]   Medical Diagnosis(-es)/Procedure(s): lesion of brain  mass of iliopsoas muscle group  anemia  diabetes mellitus type 2 in obese   history of breast cancer  COPD   history of deep vein thrombosis  essential hypertension  GERD  chronic pain   Code Status: Full Code    Date of Admission: 3/8/2019   Length of Stay: 5 days      Spiritual Screen Results:  Is your spiritual health or inner well-being important to you as you cope with your medical condition?:      Yes  Would you like to receive a visit from our Spiritual Care team or your own Restoration or spiritual leader?:      Yes  Was spiritual care education provided to the patient?:      Declined    Sources of Hope/Dominique:     Family     Encounter/Request Information  Visited With:      Patient  Nature of the Visit:     Initial, On shift  Continue Visiting:     Yes  Crisis Visit:      Major loss  Referral From/ Origin of Request:   Epic nursing    Religous Needs/Values  Yarsanism Needs:     Prayer    Spiritual Assessment   Observations/Symptoms:    Tearful  Interacton/Conversation:    Patient talked about being depressed because her  has left her.  Assessment:      Despair  Despair:      Abandonment, Alienation, Loneliness, Hopelessness/Despair  Interventions:      compassionate presence, emotional support, prayer  Outcomes:      Coping, Emotional Release    Spiritual Care Provider Information:  Title of Spiritual Care Provider:    Name of Spiritual Care Provider:   XENIA Ding  Phone Number:     [phone]   E-Mail:       [email or web address]  Total Time:      10 minutes

## 2019-03-13 NOTE — CARE PLAN
Problem: Safety  Goal: Will remain free from falls    Intervention: Implement fall precautions  Fall precautions in place including frequent rounding, treaded socks, and bed strip alarm.       Problem: Bowel/Gastric:  Goal: Normal bowel function is maintained or improved  Outcome: PROGRESSING AS EXPECTED  Pt with BM today.     Problem: Respiratory:  Goal: Respiratory status will improve    Intervention: Administer and titrate oxygen therapy  Pt satting 100% on 3 L, titrated down to 2 L, pt still satting mid 90's. Did attempt to wean from oxygen as pt states she does not wear at home, but pt desatted at times into 80's on RA.

## 2019-03-13 NOTE — CARE PLAN
Problem: Nutritional:  Goal: Achieve adequate nutritional intake  Patient will consume 50% of meals   Outcome: PROGRESSING AS EXPECTED  Patient eating 25-75% of documented meals and RN reports that patient has been eating fairly well with ~ 50% of most meals consumed.   Requested scale weight as able.    RD will continue to monitor for consistency.

## 2019-03-13 NOTE — THERAPY
"Physical Therapy Evaluation completed.   Bed Mobility:  Supine to Sit: Minimal Assist  Transfers: Sit to Stand: Minimal Assist  Gait: Level Of Assist: Minimal Assist with Front-Wheel Walker       Plan of Care: Will benefit from Physical Therapy 3 times per week  Discharge Recommendations: Equipment: Will Continue to Assess for Equipment Needs. Recommend inpatient transitional care services for continued physical therapy services.      See \"Rehab Therapy-Acute\" Patient Summary Report for complete documentation.     Pt was recently admitted for R hip and back pain; Pt had concerns of maligancy in R hip, however, biospy revealed to be consistent with absecess/infection. Pt is diagnosed with mets to brain. Pt presented with impaired balance, impaired coordination, impaired gait, poor motor planning/sequencing, impaired cognition, and dec activity tolerance. Upon evalution pt presented with an odd affect and tends to be tangential at times requiring cue to focus on task at hand. Pt was able to demonstrate Min A for all functional mobility at this time w/FWW use. Pt demonstrated with a posterior lean upon standing and required Min A for correction. Once ambulatory pt demontrated with anterior lean on FWW. Pt demonstrated with symptoms of SOB with short distance ambulation and appears to be anxious at times. Pt requires cues for redirection and sequencing for transfer and appropriate gait mechaincs. Unable to determine prior living situation and level of function due to poor cognition and poor attention during questioning. Pt will continue to benefit from skilled PT while in house, with recommendation for post acute therapy prior to d/c home given current objective findings, age, and limited support. Will continue to follow.   "

## 2019-03-13 NOTE — CONSULTS
DATE OF SERVICE:  03/13/2019    INFECTIOUS DISEASE CONSULTATION    REASON FOR CONSULT:  Multiple brain lesions.    CONSULTING PHYSICIAN:  Jaimee Keller DO    HISTORY OF PRESENT ILLNESS:  Please note, majority of the history is obtained   from the chart as patient remains confused and somnolent and is unable to   provide any additional history.  This is a 68-year-old female that was   initially admitted to the hospital on 03/08/2019 as a transfer from an   outlying facility due to right hip and pelvic pain.  During the evaluation,   she was found to have a right iliopsoas lesion and given her history of breast   cancer, there was concern for metastatic malignancy.  She was subsequently   transferred to a skilled nursing facility where she developed fever to 104   with altered mental status.  She was empirically treated for UTI.  She had an   MRI of the brain, which showed multiple lesions.  So, she was transferred to   [facility] for higher level of care.  She is now status post biopsy of   the iliopsoas lesion and appears to be infectious in nature.  Cultures are   pending.  She is currently receiving Zosyn and infectious disease is consulted   for antibiotic recommendations and management.  It is unclear how long she   had the right hip and pelvic pain.  There are no reported associated symptoms   other than the fever and confusion.  The severity of the pain is not   documented.  Patient is arousable to voice.  She states that she still has   some pain in the bilateral hip area, but was unable to specify severity or   radiation.  No reported nausea, vomiting, diarrhea, chest pain, shortness of   breath, or other symptoms.    REVIEW OF SYSTEMS:  Fever as stated above with continued altered mental   status.  All other systems reviewed and are negative.    ALLERGIES:  She has no known drug allergies.    PAST MEDICAL HISTORY:  Breast cancer with bilateral mastectomy, COPD,   diabetes, hypertension, GERD,  hyperlipidemia, and history of DVT.    FAMILY HISTORY:  Heart disease and cancer.    SOCIAL HISTORY:  She is a former smoker and does not drink or use illicit   drugs.    PHYSICAL EXAMINATION:  GENERAL:  She is a well-nourished, well-developed female, somnolent, but   awakens to voice.  VITAL SIGNS:  She has been afebrile since admission, current temperature is   98.5, blood pressure 118/63, pulse 94, respiratory rate 18, and oxygen   saturation 95-99% on 1-3 L nasal cannula.  She weighs 62.9 kilos.  HEENT:  Normocephalic, atraumatic.  Pupils are equal, round, and reactive to   light.  Extraocular movements intact.  Oropharynx is clear.  She has fair   dentition.  There are no oral exudates.  NECK:  Supple.  There is no cervical lymphadenopathy or thyromegaly.  There is   no stridor.  CHEST:  Clear to auscultation bilaterally, unlabored.  There is no chest   tenderness.  CARDIOVASCULAR:  Regular rate and rhythm, unable to auscultate any murmurs,   rubs, or gallops.  ABDOMEN:  Soft, mild subjective tenderness in the bilateral lower quadrants;   however, there is no rebound or guarding.  There is no hepatosplenomegaly.    She has positive bowel sounds.  EXTREMITIES:  Show no cyanosis, clubbing, or edema.  NEUROLOGICAL:  Neurologically, she is somnolent and falls asleep during the   exam, which she does follow commands when she is awake:  Cranial nerves are   grossly intact.  Motor is 5/5 bilaterally in upper and lower extremities.    There is no significant sarcopenia.  SKIN:  Shows multiple ecchymoses, no rash.    LABORATORY DATA:  White blood cell count is 7.2, H and H of 12.3 and 39.7, and   platelets of 246.  She does have a left shift with 81% poly.  Sodium 132,   potassium 3.6, chloride 96, bicarbonate 24, glucose 99, BUN 12, creatinine   0.59, and calcium 9.8.  AST 42, ALT 18, alkaline phosphatase is elevated at   417, and total bilirubin is 0.7.  Gamma GT was 93, glycosylated hemoglobin on   12/14/2018 was  6.3.  Urinalysis showed 2-5 wbc's.  C-reactive protein was   elevated at 3.5.  Procalcitonin was 0.25.  Blood cultures x2 from the 12th are   negative.  Cultures from the right gluteal aspiration are negative so far.    Gram stain shows moderate wbc's, no organisms.  CT scan from the outlying   facility of the chest showed no pulmonary embolus.  She did have a right iliac   wing fracture and a right iliopsoas lesion.    ASSESSMENT AND PLAN:  A 68-year-old female with history of diabetes and breast   cancer, admitted with abdominal pain, iliopsoas lesion, and an abnormal MRI   with multiple brain lesions.  Review of outside medical records shows that she   had a fall in August, which was the cause of the fracture of her pelvis and   she underwent ORIF and she required significant rehabilitation.  MRI was   reviewed by me as well as the official report she does have multiple small   lesions, which are enhanced including one 6 mm lesion in the aaron and the   lesions are scattered throughout the brain.  MRA showed no aneurysm.  The CT   of the abdomen showed the 2.6 cm lesion in the right iliopsoas region as well   as a 2.8x2.1 cm lesion in the right gluteal musculature and posterior to the   right iliac wing small bilateral lymph nodes.  Surgical fusion of the   bilateral sacroiliac joint was noted.  Because of her fever and the finding of   moderate wbc's on the aspiration that was performed on 03/11.  Concern for   infectious processes has been entertained.  She is currently receiving Zosyn.    We will change to cefepime for better CNS penetration and add vancomycin,   pending further workup.  She did have a transthoracic echocardiogram, which   results are pending. CHAS if unrevealing. Blood culture so far negative.  Because of the pattern   of the lesions on the MRI, primary concern is for metastatic disease, but do   agree with pursuing an infectious workup as the lesions are not amenable to   biopsy.  If her  transthoracic echocardiogram was unrevealing, would proceed   with a transesophageal echocardiogram.  We will continue to wait final culture   results from the psoas lesion with further recommendations per culture   results and clinical course.  Discussed with internal medicine.    Patient is a diabetic.  Her blood sugars appear to have been well controlled.    Her last glycosylated hemoglobin was 6.3.    Thank you and we will follow with you.       ____________________________________     MD ALPA STRANGE / JUAN    DD:  03/13/2019 12:26:39  DT:  03/13/2019 13:11:39    D#:  [id number]  Job#:  [id number]

## 2019-03-13 NOTE — PROGRESS NOTES
Pharmacy Kinetics 68 y.o. female on vancomycin day # 1 3/13/2019    Currently received:  Vancomycin 2000 mg iv once ~12     Indication for Treatment: CNS Infection concern    Pertinent history per medical record: Admitted on 3/8/2019 as a transfer to [facility] after presenting to outside facility for right hip and pelvic pain. The patient received extensive workup at outside facility showing right iliopsoas lesion and questionable metastasis to other locations. She became febrile to 104 when attempted transfer to [facility] and was thus transferred here. MRI positive for multiple lesions. Patient does have history of breast cancer and initial concern was malignancy. However now, s/p bx, concern for infection. ID has been consulted.     Other antibiotics:   Cefepime 3g iv q12hrs    Allergies: Patient has no known allergies.     List concerns for renal function: BUN/SCr ratio > 20:1, hypermetabolic state (SIRS).    Pertinent cultures to date:   3/11 - R gluteal muscle - Neg  3/12 - Blood - Neg    MRSA nares swab if pneumonia is a concern (ordered/positive/negative/n-a): Na    Recent Labs      19   0032  19   0019   WBC  5.8  7.2   NEUTSPOLYS  74.90*  81.40*     Recent Labs      19   0032  19   0019   BUN  10  12   CREATININE  0.60  0.59   ALBUMIN  3.4  3.4     Intake/Output Summary (Last 24 hours) at 19 1318  Last data filed at 19 0855   Gross per 24 hour   Intake                0 ml   Output              300 ml   Net             -300 ml      Blood pressure 118/63, pulse 94, temperature 36.9 °C (98.5 °F), temperature source Oral, resp. rate 18, height 1.524 m (5'), weight 62.9 kg (138 lb 10.7 oz), SpO2 95 %, not currently breastfeeding. Temp (24hrs), Av.9 °C (98.4 °F), Min:36.6 °C (97.8 °F), Max:37.3 °C (99.2 °F)      A/P   1. Vancomycin dose change: New start Vancomycin 1000mg iv q12hr (00,12)  2. Next vancomycin level: 3/14 @ 11  3. Goal trough: 18-22mcg/mL  4. Comments: Urine output  unknown and patient does have risk factors for accumulation. Will draw trough prior to third dose to assess level in the event that a higher goal is needed or/ supratherapeutic concern. Pharmacy will continue to monitor and assess daily.       Michelle Kay, PharmD

## 2019-03-13 NOTE — PROGRESS NOTES
"[facility] Medicine Daily Progress Note    Date of Service  3/13/2019    Chief Complaint  68 y.o. female admitted 3/8/2019 with right hip pain, fever and abnormal brain mri.    Hospital Course    Patient started on empiric antibiotics given fever.  She had abnormal findings on MRI brain and CT showed \"lesion\" on right iliopsoas.  This lesion was biopsied and turned out to be abscess.  She has history of breast cancer and there is also concern of brain lesions being metastatic though their appearance is not typical of this.      Interval Problem Update  3/12 Discussed with Dr Turner for second opinion of brain lesions and based on appearance not able to r/o or r/i any diagnosis.  Given patient's presentation of confusion, hip pain and fever  - this is more supportive of infectious etiology rather than malignant.  Fluid from right gluteal area sent to micro and as of yet - no growth.  Continue zosyn for now.  3/13 Patient with slight improvement in mentation.  Blood cultures and abscess cultures are showing no growth at this time.  TTE being done while I examined patient - no evidence of vegetations on this test.  ID consultation pending - d/w Dr Garcia.    Consultants/Specialty  none    Code Status  full    Disposition  tbd    Review of Systems  Review of Systems   Constitutional: Negative for chills and fever.   HENT: Negative for congestion and sore throat.    Eyes: Negative for blurred vision and photophobia.   Respiratory: Negative for cough and shortness of breath.    Cardiovascular: Negative for chest pain, claudication and leg swelling.   Gastrointestinal: Negative for abdominal pain, constipation, diarrhea, heartburn, nausea and vomiting.   Genitourinary: Negative for dysuria and hematuria.   Musculoskeletal: Positive for myalgias (hip pain). Negative for joint pain (left hip pain).   Skin: Negative for itching and rash.   Neurological: Negative for dizziness, sensory change, speech change, weakness and headaches. "   Psychiatric/Behavioral: Negative for depression. The patient is not nervous/anxious and does not have insomnia.         Physical Exam  Temp:  [36.6 °C (97.8 °F)-37.3 °C (99.2 °F)] 36.9 °C (98.5 °F)  Pulse:  [] 94  Resp:  [18] 18  BP: (109-135)/(63-87) 118/63  SpO2:  [95 %-98 %] 95 %    Physical Exam   Constitutional: She is oriented to person, place, and time. She appears well-developed and well-nourished. No distress.   HENT:   Head: Normocephalic and atraumatic.   Eyes: Conjunctivae are normal. No scleral icterus.   Neck: Neck supple. No JVD present.   Cardiovascular: Normal rate, regular rhythm and normal heart sounds.  Exam reveals no gallop and no friction rub.    No murmur heard.  Pulmonary/Chest: Effort normal and breath sounds normal. No respiratory distress. She exhibits no tenderness.   Abdominal: Soft. Bowel sounds are normal. She exhibits no distension. There is no guarding.   Musculoskeletal: She exhibits no edema or tenderness.   Lymphadenopathy:     She has no cervical adenopathy.   Neurological: She is alert and oriented to person, place, and time. No cranial nerve deficit.   Skin: Skin is warm and dry. She is not diaphoretic. No erythema. No pallor.   Psychiatric: She has a normal mood and affect. Her behavior is normal.   Patient less impulsive today.     Nursing note and vitals reviewed.      Fluids    Intake/Output Summary (Last 24 hours) at 03/13/19 1310  Last data filed at 03/13/19 0855   Gross per 24 hour   Intake                0 ml   Output              300 ml   Net             -300 ml       Laboratory  Recent Labs      03/11/19   0032  03/12/19   0019   WBC  5.8  7.2   RBC  4.88  4.91   HEMOGLOBIN  12.0  12.3   HEMATOCRIT  38.5  39.7   MCV  78.9*  80.9*   MCH  24.6*  25.1*   MCHC  31.2*  31.0*   RDW  51.1*  53.2*   PLATELETCT  228  246   MPV  9.7  9.8     Recent Labs      03/11/19   0032  03/12/19   0019   SODIUM  129*  132*   POTASSIUM  3.2*  3.6   CHLORIDE  95*  96   CO2  25  24    GLUCOSE  93  99   BUN  10  12   CREATININE  0.60  0.59   CALCIUM  9.2  9.8                   Imaging  EC-ECHOCARDIOGRAM COMPLETE W/O CONT         OUTSIDE IMAGES-CT CHEST/ABDOMEN/PELVIS   Final Result      OUTSIDE IMAGES-DX CHEST   Final Result      ZB-FBRGPUA-NGHQBMR FILM X-RAY   Final Result      OUTSIDE IMAGES-MR BRAIN   Final Result      OUTSIDE IMAGES-MR BRAIN   Final Result      CT-NEEDLE BX-MUSCLE RIGHT    (Results Pending)        Assessment/Plan  * Lesion of brain   Assessment & Plan    Metastatic cancer vs infectious etiology  D/w Dr Black for questionable brain mets, recommending IR Bx of psoas muscle   IR Bx of psoas muscle ordered - was abscess  Empiric treatment of infection vs superficial biopsy - will discuss further with ID before involving neurosurgery - patient not able to consent at this time either d/t intermittent confusion.       Encephalopathy   Assessment & Plan    Likely toxic metabolic from infection  Improved       Mass of iliopsoas muscle group   Assessment & Plan    Consulted Dr Black for brain mets, recommending IR Bx of psoas muscle   IR Bx of psoas muscle completed, fluid drained - suspected abscess  Continue abx, culture without growth, ID consult.         Anemia   Assessment & Plan    Continue to monitor, transfuse for hemoglobin less than 7     RLS (restless legs syndrome)- (present on admission)   Assessment & Plan    Pramipexole       Diabetes mellitus type 2 in obese (HCC)- (present on admission)   Assessment & Plan    Glycated hemoglobin 6.3, 3 months ago  Well controlled   Short acting insulin as needed   Accu-Checks, hypoglycemia protocol     Consider stopping accu-checks after Bx if still good control      History of breast cancer- (present on admission)   Assessment & Plan    Mets vs infection to brain  No known active breast cancer.       COPD (chronic obstructive pulmonary disease) (HCC)- (present on admission)   Assessment & Plan    Oxygen as needed, Respiratory  protocol, Bronchodilators, Incentive spirometry      History of deep vein thrombosis- (present on admission)   Assessment & Plan    History of deep vein thrombosis   Was on Rivaroxaban as outpatient.     Held for Bx Mar 11, continue to hold if IC biopsy proves necessary  Lovenox daily for DVT prophylaxis while full AC on hold.         Essential hypertension- (present on admission)   Assessment & Plan    Restarted on losartan and furosemide with hold parameters.     GERD (gastroesophageal reflux disease)- (present on admission)   Assessment & Plan    Omeprazole     Chronic pain- (present on admission)   Assessment & Plan     Multifactorial  Likely secondary to abscess, fracture, etc.            VTE prophylaxis: scds

## 2019-03-13 NOTE — CARE PLAN
Problem: Safety  Goal: Will remain free from injury    Intervention: Provide assistance with mobility  Pt is A&Ox4, with intermittent confusion, x2 assist with fww. Bed alarm on, call light within reach, treaded socks in place.       Problem: Pain Management  Goal: Pain level will decrease to patient's comfort goal    Intervention: Follow pain managment plan developed in collaboration with patient and Interdisciplinary Team  Pt reports pain using 0-10 scale, medicated per MAR.

## 2019-03-13 NOTE — PROGRESS NOTES
Assumed care of patient at 1900. Pt is A&Ox4 but has intermittent confusion, x2 assist with fww. Incontinence. Pt is on 3 L O2 via NC,  in place. Reports 7/10 pain, medicated per MAR. Denies nausea. PIV patent and infusing. Bed alarm on. No further needs at this time. Call light within reach, will continue to round hourly.

## 2019-03-14 LAB
ALBUMIN SERPL BCP-MCNC: 3.3 G/DL (ref 3.2–4.9)
ALBUMIN/GLOB SERPL: 0.8 G/DL
ALP SERPL-CCNC: 427 U/L (ref 30–99)
ALT SERPL-CCNC: 18 U/L (ref 2–50)
ANION GAP SERPL CALC-SCNC: 8 MMOL/L (ref 0–11.9)
AST SERPL-CCNC: 36 U/L (ref 12–45)
BASOPHILS # BLD AUTO: 0.3 % (ref 0–1.8)
BASOPHILS # BLD: 0.02 K/UL (ref 0–0.12)
BILIRUB SERPL-MCNC: 0.6 MG/DL (ref 0.1–1.5)
BUN SERPL-MCNC: 12 MG/DL (ref 8–22)
CALCIUM SERPL-MCNC: 9.5 MG/DL (ref 8.5–10.5)
CHLORIDE SERPL-SCNC: 93 MMOL/L (ref 96–112)
CO2 SERPL-SCNC: 29 MMOL/L (ref 20–33)
CREAT SERPL-MCNC: 0.6 MG/DL (ref 0.5–1.4)
EOSINOPHIL # BLD AUTO: 0.1 K/UL (ref 0–0.51)
EOSINOPHIL NFR BLD: 1.7 % (ref 0–6.9)
ERYTHROCYTE [DISTWIDTH] IN BLOOD BY AUTOMATED COUNT: 52.3 FL (ref 35.9–50)
GLOBULIN SER CALC-MCNC: 4.2 G/DL (ref 1.9–3.5)
GLUCOSE SERPL-MCNC: 102 MG/DL (ref 65–99)
HCT VFR BLD AUTO: 37.5 % (ref 37–47)
HGB BLD-MCNC: 11.6 G/DL (ref 12–16)
IMM GRANULOCYTES # BLD AUTO: 0.02 K/UL (ref 0–0.11)
IMM GRANULOCYTES NFR BLD AUTO: 0.3 % (ref 0–0.9)
LYMPHOCYTES # BLD AUTO: 1.1 K/UL (ref 1–4.8)
LYMPHOCYTES NFR BLD: 19 % (ref 22–41)
MCH RBC QN AUTO: 24.8 PG (ref 27–33)
MCHC RBC AUTO-ENTMCNC: 30.9 G/DL (ref 33.6–35)
MCV RBC AUTO: 80.1 FL (ref 81.4–97.8)
MONOCYTES # BLD AUTO: 0.59 K/UL (ref 0–0.85)
MONOCYTES NFR BLD AUTO: 10.2 % (ref 0–13.4)
NEUTROPHILS # BLD AUTO: 3.96 K/UL (ref 2–7.15)
NEUTROPHILS NFR BLD: 68.5 % (ref 44–72)
NRBC # BLD AUTO: 0 K/UL
NRBC BLD-RTO: 0 /100 WBC
PLATELET # BLD AUTO: 255 K/UL (ref 164–446)
PMV BLD AUTO: 9.9 FL (ref 9–12.9)
POTASSIUM SERPL-SCNC: 3.5 MMOL/L (ref 3.6–5.5)
PROT SERPL-MCNC: 7.5 G/DL (ref 6–8.2)
RBC # BLD AUTO: 4.68 M/UL (ref 4.2–5.4)
SODIUM SERPL-SCNC: 130 MMOL/L (ref 135–145)
VANCOMYCIN TROUGH SERPL-MCNC: 20.4 UG/ML (ref 10–20)
WBC # BLD AUTO: 5.8 K/UL (ref 4.8–10.8)

## 2019-03-14 PROCEDURE — 80053 COMPREHEN METABOLIC PANEL: CPT

## 2019-03-14 PROCEDURE — A9270 NON-COVERED ITEM OR SERVICE: HCPCS | Performed by: INTERNAL MEDICINE

## 2019-03-14 PROCEDURE — 700111 HCHG RX REV CODE 636 W/ 250 OVERRIDE (IP): Performed by: INTERNAL MEDICINE

## 2019-03-14 PROCEDURE — A9270 NON-COVERED ITEM OR SERVICE: HCPCS | Performed by: HOSPITALIST

## 2019-03-14 PROCEDURE — 700102 HCHG RX REV CODE 250 W/ 637 OVERRIDE(OP): Performed by: INTERNAL MEDICINE

## 2019-03-14 PROCEDURE — 97535 SELF CARE MNGMENT TRAINING: CPT

## 2019-03-14 PROCEDURE — 700102 HCHG RX REV CODE 250 W/ 637 OVERRIDE(OP): Performed by: HOSPITALIST

## 2019-03-14 PROCEDURE — 99233 SBSQ HOSP IP/OBS HIGH 50: CPT | Performed by: INTERNAL MEDICINE

## 2019-03-14 PROCEDURE — 99232 SBSQ HOSP IP/OBS MODERATE 35: CPT | Performed by: INTERNAL MEDICINE

## 2019-03-14 PROCEDURE — 770004 HCHG ROOM/CARE - ONCOLOGY PRIVATE *

## 2019-03-14 PROCEDURE — 85025 COMPLETE CBC W/AUTO DIFF WBC: CPT

## 2019-03-14 PROCEDURE — 700105 HCHG RX REV CODE 258: Performed by: INTERNAL MEDICINE

## 2019-03-14 PROCEDURE — 80202 ASSAY OF VANCOMYCIN: CPT

## 2019-03-14 PROCEDURE — 36415 COLL VENOUS BLD VENIPUNCTURE: CPT

## 2019-03-14 RX ADMIN — CEFEPIME 2 G: 2 INJECTION, POWDER, FOR SOLUTION INTRAVENOUS at 04:55

## 2019-03-14 RX ADMIN — LOSARTAN POTASSIUM 50 MG: 50 TABLET ORAL at 04:55

## 2019-03-14 RX ADMIN — OXYCODONE HYDROCHLORIDE 5 MG: 5 TABLET ORAL at 11:28

## 2019-03-14 RX ADMIN — PRAMIPEXOLE DIHYDROCHLORIDE 0.25 MG: 0.25 TABLET ORAL at 19:49

## 2019-03-14 RX ADMIN — OMEPRAZOLE 20 MG: 20 CAPSULE, DELAYED RELEASE ORAL at 04:55

## 2019-03-14 RX ADMIN — VANCOMYCIN HYDROCHLORIDE 1000 MG: 100 INJECTION, POWDER, LYOPHILIZED, FOR SOLUTION INTRAVENOUS at 13:33

## 2019-03-14 RX ADMIN — POTASSIUM CHLORIDE 40 MEQ: 1500 TABLET, EXTENDED RELEASE ORAL at 04:56

## 2019-03-14 RX ADMIN — GABAPENTIN 300 MG: 300 CAPSULE ORAL at 04:56

## 2019-03-14 RX ADMIN — ENOXAPARIN SODIUM 40 MG: 100 INJECTION SUBCUTANEOUS at 04:55

## 2019-03-14 RX ADMIN — OXYCODONE HYDROCHLORIDE 5 MG: 5 TABLET ORAL at 05:02

## 2019-03-14 RX ADMIN — SENNOSIDES AND DOCUSATE SODIUM 2 TABLET: 8.6; 5 TABLET ORAL at 04:55

## 2019-03-14 RX ADMIN — CEFEPIME 2 G: 2 INJECTION, POWDER, FOR SOLUTION INTRAVENOUS at 17:01

## 2019-03-14 RX ADMIN — GABAPENTIN 300 MG: 300 CAPSULE ORAL at 17:00

## 2019-03-14 RX ADMIN — FUROSEMIDE 40 MG: 40 TABLET ORAL at 17:00

## 2019-03-14 RX ADMIN — FUROSEMIDE 40 MG: 40 TABLET ORAL at 04:55

## 2019-03-14 RX ADMIN — TRAMADOL HYDROCHLORIDE 50 MG: 50 TABLET, COATED ORAL at 19:49

## 2019-03-14 ASSESSMENT — ENCOUNTER SYMPTOMS
COUGH: 0
SHORTNESS OF BREATH: 0
BLURRED VISION: 0
SENSORY CHANGE: 0
FEVER: 0
MYALGIAS: 1
CLAUDICATION: 0
NAUSEA: 0
NERVOUS/ANXIOUS: 0
DOUBLE VISION: 0
CHILLS: 0
SORE THROAT: 0
CONSTIPATION: 0
INSOMNIA: 0
WEAKNESS: 0
HEARTBURN: 0
ABDOMINAL PAIN: 0
PHOTOPHOBIA: 0
HEADACHES: 0
DIZZINESS: 0
DIARRHEA: 0
TREMORS: 0
VOMITING: 0
SPEECH CHANGE: 0
DEPRESSION: 0

## 2019-03-14 ASSESSMENT — COGNITIVE AND FUNCTIONAL STATUS - GENERAL
PERSONAL GROOMING: A LITTLE
SUGGESTED CMS G CODE MODIFIER DAILY ACTIVITY: CK
TOILETING: A LOT
DRESSING REGULAR UPPER BODY CLOTHING: A LITTLE
DAILY ACTIVITIY SCORE: 17
DRESSING REGULAR LOWER BODY CLOTHING: A LITTLE
HELP NEEDED FOR BATHING: A LOT

## 2019-03-14 NOTE — PROGRESS NOTES
Infectious Disease Progress Note    Author: Karen Garcia M.D. Date & Time of service: 3/14/2019  12:40 PM    Chief Complaint:  Multiple brain lesions  Iliopsoas lesion    Interval History:  68-year-old female with history of diabetes and breast cancer, admitted 3/8/2019 with abdominal pain, iliopsoas lesion, and an abnormal MRI with multiple brain lesions.  3/14 AF (99.2) WBC 5.8 more awake today as compared to yesterday but not oriented-denies pain, following commands. ECHO reviewed    Labs Reviewed, Medications Reviewed     Review of Systems:  Review of Systems   Constitutional: Negative for chills and fever.   HENT: Negative for hearing loss.    Eyes: Negative for blurred vision and double vision.   Cardiovascular: Negative for chest pain.   Skin: Negative for rash.   Neurological: Negative for dizziness, tremors, speech change and headaches.   Psychiatric/Behavioral: Negative for depression. The patient is not nervous/anxious.    All other systems reviewed and are negative.      Hemodynamics:  Temp (24hrs), Av.1 °C (98.7 °F), Min:36.8 °C (98.3 °F), Max:37.3 °C (99.2 °F)  Temperature: 37.3 °C (99.2 °F)  Pulse  Av.1  Min: 82  Max: 125  Blood Pressure : 130/56       Physical Exam:  Physical Exam   Constitutional: She appears well-developed.   HENT:   Mouth/Throat: No oropharyngeal exudate.   Good dentition   Eyes: Pupils are equal, round, and reactive to light. EOM are normal.   Neck: Neck supple. No thyromegaly present.   Cardiovascular: Normal rate and intact distal pulses.    Murmur heard.  Pulmonary/Chest: Effort normal. No respiratory distress. She exhibits no tenderness.   Abdominal: Soft. She exhibits no distension. There is no tenderness.   Musculoskeletal: She exhibits no edema, tenderness or deformity.   Neurological: She is alert. No cranial nerve deficit. Coordination normal.   Skin: Skin is warm. No rash noted. She is not diaphoretic.   Nursing note and vitals  reviewed.      Meds:    Current Facility-Administered Medications:   •  MD Alert...Vancomycin per Pharmacy  •  cefepime  •  enoxaparin (LOVENOX) injection  •  vancomycin  •  pramipexole  •  potassium chloride SA  •  furosemide  •  tramadol  •  losartan  •  gabapentin  •  omeprazole  •  polyethylene glycol/lytes  •  senna-docusate **AND** polyethylene glycol/lytes **AND** magnesium hydroxide **AND** bisacodyl  •  Respiratory Care per Protocol  •  Respiratory Care per Protocol  •  acetaminophen  •  Notify provider if pain remains uncontrolled **AND** Use the numeric rating scale (NRS-11) on regular floors and Critical-Care Pain Observation Tool (CPOT) on ICUs/Trauma to assess pain **AND** Pulse Ox (Oximetry) **AND** Pharmacy Consult Request **AND** If patient difficult to arouse and/or has respiratory depression, stop any opiates that are currently infusing and call a Rapid Response. **AND** oxyCODONE immediate-release **AND** oxyCODONE immediate-release **AND** morphine injection  •  ondansetron  •  ondansetron    Labs:  Recent Labs      03/12/19 0019  03/14/19   0022   WBC  7.2  5.8   RBC  4.91  4.68   HEMOGLOBIN  12.3  11.6*   HEMATOCRIT  39.7  37.5   MCV  80.9*  80.1*   MCH  25.1*  24.8*   RDW  53.2*  52.3*   PLATELETCT  246  255   MPV  9.8  9.9   NEUTSPOLYS  81.40*  68.50   LYMPHOCYTES  8.50*  19.00*   MONOCYTES  8.40  10.20   EOSINOPHILS  0.70  1.70   BASOPHILS  0.60  0.30     Recent Labs      03/12/19   0019  03/14/19   0022   SODIUM  132*  130*   POTASSIUM  3.6  3.5*   CHLORIDE  96  93*   CO2  24  29   GLUCOSE  99  102*   BUN  12  12     Recent Labs      03/12/19   0019  03/14/19   0022   ALBUMIN  3.4  3.3   TBILIRUBIN  0.7  0.6   ALKPHOSPHAT  417*  427*   TOTPROTEIN  7.7  7.5   ALTSGPT  18  18   ASTSGOT  42  36   CREATININE  0.59  0.60       Imaging:  Ct-needle Bx-muscle Right    Result Date: 3/13/2019  3/11/2019 4:44 PM HISTORY/REASON FOR EXAM:  Right gluteal hypodense lesion. Moderate sedation was  administered with continuous monitoring of the patient under the direct supervision of  Medications: 2 mg of Versed and 200 mcg of fentanyl Total sedation time 60 minutes Procedure: After the informed consent the patient was placed on the CT table on prone position. Localization CT scan was obtained. It demonstrates hypodense area in the right gluteus muscle. Subsequently a 17-gauge needle was advanced into the lesion. 20  mL of pus was aspirated. The materials were sent to the pathology. The patient tolerated the procedure without any immediate competition.     CT-guided aspiration of pus like material in the right gluteal muscle lesion.    Ec-echocardiogram Complete W/o Cont    Result Date: 3/13/2019  Transthoracic Echo Report Echocardiography Laboratory CONCLUSIONS No prior study is available for comparison. Normal left ventricular systolic function. Left ventricular ejection fraction is visually estimated to be 65%. Normal diastolic function. Normal inferior vena cava size and inspiratory collapse. Aortic sclerosis without stenosis. Estimated right ventricular systolic pressure  is 33 mmHg. No obvious valvular vegetations are noted on a decent quality study.  If further valvular assessment is clinically indicated, consider transesophageal echocardiogram. SAM,  LV EF:  65    % FINDINGS Left Ventricle Normal left ventricular size and systolic function. Normal left ventricular wall thickness. Left ventricular ejection fraction is visually estimated to be 65%. Normal diastolic function. Right Ventricle Normal right ventricular size and systolic function. Right Atrium Normal right atrial size. Normal inferior vena cava size and inspiratory collapse. Left Atrium Normal left atrial size. Mitral Valve Structurally normal mitral valve without significant stenosis or regurgitation. Aortic Valve Aortic sclerosis without stenosis. No aortic insufficiency. Tricuspid Valve Structurally normal tricuspid  "valve. Mild tricuspid regurgitation. Right atrial pressure is estimated to be 3 mmHg. Estimated right ventricular systolic pressure  is 33 mmHg. Pulmonic Valve The pulmonic valve is not well visualized. No pulmonic insufficiency. Pericardium Normal pericardium without effusion. Aorta The aortic root is normal.  Ascending aorta diameter is 3.0 cm. Claire Tripathi MD (Electronically Signed) Final Date:     13 March 2019                 14:38      Micro:  Results     Procedure Component Value Units Date/Time    CULTURE WOUND W/ GRAM STAIN [[id number]] Collected:  03/11/19 1800    Order Status:  Completed Specimen:  Wound Updated:  03/14/19 0943     Significant Indicator NEG     Source WND     Site Right Gluteal Muscles     Culture Result Wound No growth at 72 hours     Gram Stain Result Moderate WBCs.  No organisms seen.      BLOOD CULTURE [[id number]] Collected:  03/12/19 1826    Order Status:  Completed Specimen:  Blood from Peripheral Updated:  03/13/19 0748     Significant Indicator NEG     Source BLD     Site PERIPHERAL     Blood Culture No Growth    Note: Blood cultures are incubated for 5 days and  are monitored continuously.Positive blood cultures  are called to the RN and reported as soon as  they are identified.      Narrative:       Per Hospital Policy: Only change Specimen Src: to \"Line\" if  specified by physician order.    BLOOD CULTURE [[id number]] Collected:  03/12/19 1826    Order Status:  Completed Specimen:  Blood from Peripheral Updated:  03/13/19 0748     Significant Indicator NEG     Source BLD     Site PERIPHERAL     Blood Culture No Growth    Note: Blood cultures are incubated for 5 days and  are monitored continuously.Positive blood cultures  are called to the RN and reported as soon as  they are identified.      Narrative:       Per Hospital Policy: Only change Specimen Src: to \"Line\" if  specified by physician order.    URINALYSIS [[id number]]  (Abnormal) Collected:  03/13/19 0450    Order Status:  " Completed Specimen:  Urine from Urine, Clean Catch Updated:  03/13/19 0503     Color Yellow     Character Clear     Specific Gravity 1.015     Ph 5.5     Glucose Negative mg/dL      Ketones Negative mg/dL      Protein Negative mg/dL      Bilirubin Negative     Urobilinogen, Urine 0.2     Nitrite Negative     Leukocyte Esterase Trace (A)     Occult Blood Negative     Micro Urine Req Microscopic    Narrative:       Collected By:[id number] CINDY MOHINDER    URINALYSIS [[id number]] Collected:  03/13/19 0450    Order Status:  Canceled Specimen:  Urine from Urine, Clean Catch     GRAM STAIN [[id number]] Collected:  03/11/19 1800    Order Status:  Completed Specimen:  Wound Updated:  03/12/19 0620     Significant Indicator .     Source WND     Site Right Gluteal Muscles     Gram Stain Result Moderate WBCs.  No organisms seen.      CULTURE TISSUE W/ GRM STAIN [[id number]]     Order Status:  No result Specimen:  Tissue from Other Tissue           Assessment:  Active Hospital Problems    Diagnosis   • *Lesion of brain [G93.9]   • Mass of iliopsoas muscle group [M62.89]   • Encephalopathy [G93.40]   • Hyponatremia [E87.1]   • History of breast cancer [Z85.3]   • Diabetes mellitus type 2 in obese (HCC) [E11.69, E66.9]       Plan:  Multiple brain lesions  Fever resolved  No current leukocytosis  Still with AMS  MRI with scattered small lesions incl aaron  TTE neg; CHAS if feasible  Bcxs neg  Continue vanco and cefepime for now  Monitor renal function closely while on vanco    Iliopsoas mass  CT + 2.6 cm lesion in the right iliopsoas region as well as a 2.8x2.1 cm lesion in the right gluteal musculature   S/p aspiration +WBCS  Cx neg so far  Abx as above    H/o breast cancer  Markedly elevated alk phos  Recommend continued diaz for mets    Diabetes  Keep BS under 150 to help control current infection  Current BS     DW IM Dr Keller

## 2019-03-14 NOTE — PROGRESS NOTES
Assumed care of patient at 1900. Pt is A&Ox4, intermittent confusion, up x1 with fww. Bed alarm on. Pt is on 3 L O2 via NC,  in place. R PIV patent and infusing. Reports 10/10 pain, medicated per MAR. Denies nausea. No further needs at this time. Call light within reach, will continue to round hourly.

## 2019-03-14 NOTE — CARE PLAN
Problem: Safety  Goal: Will remain free from falls    Intervention: Implement fall precautions  Pt is A&Ox4, intermittent confusion. Bed alarm on, Treaded socks in place. Bed locked in lowest position.       Problem: Pain Management  Goal: Pain level will decrease to patient's comfort goal    Intervention: Follow pain managment plan developed in collaboration with patient and Interdisciplinary Team  Pt reports pain using 0-10 scale, medicated per MAR.

## 2019-03-14 NOTE — PROGRESS NOTES
Patient resting in bed. Patient disoriented to time and situation. 20 G PIV in right arm - CDI - NS locked. Patient receiving IV abx. Patient currently on 3L O2 via NC - O2 maintaining above 90%.

## 2019-03-14 NOTE — PROGRESS NOTES
Pharmacy Kinetics 68 y.o. female on vancomycin day # 2 3/14/2019    Vancomycin received  2000 mg iv once 3/13 @1330  1000 mg iv q12h (0000, 1200)    Indication for Treatment: CNS infection concern    Pertinent history per medical record: Admitted on 3/8/2019 for AMS, SOB and hip pain. Pt has history of breast cancer, was found to have metastatic brain lesions on MRI at outside facility. Per ID note - primary concern is for metastatic disease, but do agree with pursuing an infectious workup as the lesions are not amenable to biopsy; wait for final culture results from lesions with further recommendations per culture results and clinical course.    Other antibiotics:  Cefepime 2g iv q12h    Allergies: Patient has no known allergies.     Concerns for renal function: obesity, hypermetabolic state (SIRS)    Pertinent cultures to date:   3/11: R gluteal muscle - neg  3/12: blood cx x2 - NG     Recent Labs      19   0019  19   0022   WBC  7.2  5.8   NEUTSPOLYS  81.40*  68.50     Recent Labs      19   0019  19   0022   BUN  12  12   CREATININE  0.59  0.60   ALBUMIN  3.4  3.3     Recent Labs      19   1049   VANCOTROUGH  20.4*     Intake/Output Summary (Last 24 hours) at 19 1245  Last data filed at 19 0856   Gross per 24 hour   Intake                0 ml   Output              100 ml   Net             -100 ml      Blood pressure 130/56, pulse (!) 103, temperature 37.3 °C (99.2 °F), temperature source Temporal, resp. rate 18, height 1.524 m (5'), weight 62.9 kg (138 lb 10.7 oz), SpO2 94 %, not currently breastfeeding. Temp (24hrs), Av.1 °C (98.7 °F), Min:36.8 °C (98.3 °F), Max:37.3 °C (99.2 °F)    A/P   1. Vancomycin dose change: 1000 mg iv q12h (0000, 1200)  2. Next vancomycin level: ~2-3 days  3. Goal trough: 18-22 mcg/mL  4. Comments: SCr stable over the interval, however urine output inadequate per chart review. Trough is in therapeutic range. Continue current dosing regimen  and check trough again in 2-3 days, unless patient develops further evidence of renal dysfunction. Pharmacy will continue to monitor closely and adjust doses as needed.    Martha Pradhan, Pharmacy Intern

## 2019-03-14 NOTE — PROGRESS NOTES
"[facility] Medicine Daily Progress Note    Date of Service  3/14/2019    Chief Complaint  68 y.o. female admitted 3/8/2019 with right hip pain, fever and abnormal brain mri.    Hospital Course    Patient started on empiric antibiotics given fever.  She had abnormal findings on MRI brain and CT showed \"lesion\" on right iliopsoas.  This lesion was biopsied and turned out to be abscess.  She has history of breast cancer and there is also concern of brain lesions being metastatic though their appearance is not typical of this.      Interval Problem Update  3/12 Discussed with Dr Turner for second opinion of brain lesions and based on appearance not able to r/o or r/i any diagnosis.  Given patient's presentation of confusion, hip pain and fever  - this is more supportive of infectious etiology rather than malignant.  Fluid from right gluteal area sent to micro and as of yet - no growth.  Continue zosyn for now.  3/13 Patient with slight improvement in mentation.  Blood cultures and abscess cultures are showing no growth at this time.  TTE being done while I examined patient - no evidence of vegetations on this test.  ID consultation pending - d/w Dr Garcia.  3/14 Patient feeling well today, her pain is present but not as bad as prior to admission.  She was able to follow the conversation today and is agreeable to move forward with the CHAS tomorrow.  I spoke with her brother, René, and updated him on condition yesterday afternoon also.  Will also have PICC placed in next few days as long as blood cultures remain negative as I expect a prolonged antibiotic course of treatment.    Consultants/Specialty  none    Code Status  full    Disposition  tbd    Review of Systems  Review of Systems   Constitutional: Negative for chills and fever.   HENT: Negative for congestion and sore throat.    Eyes: Negative for blurred vision and photophobia.   Respiratory: Negative for cough and shortness of breath.    Cardiovascular: Negative for " chest pain, claudication and leg swelling.   Gastrointestinal: Negative for abdominal pain, constipation, diarrhea, heartburn, nausea and vomiting.   Genitourinary: Negative for dysuria and hematuria.   Musculoskeletal: Positive for myalgias (hip pain). Negative for joint pain (left hip pain).   Skin: Negative for itching and rash.   Neurological: Negative for dizziness, sensory change, speech change, weakness and headaches.   Psychiatric/Behavioral: Negative for depression. The patient is not nervous/anxious and does not have insomnia.         Physical Exam  Temp:  [36.8 °C (98.3 °F)-37.3 °C (99.2 °F)] 37.3 °C (99.2 °F)  Pulse:  [100-115] 103  Resp:  [18] 18  BP: (130-140)/(56-78) 130/56  SpO2:  [94 %-97 %] 94 %    Physical Exam   Constitutional: She is oriented to person, place, and time. She appears well-developed and well-nourished. No distress.   HENT:   Head: Normocephalic and atraumatic.   Eyes: Conjunctivae are normal. No scleral icterus.   Neck: Neck supple. No JVD present.   Cardiovascular: Normal rate, regular rhythm and normal heart sounds.  Exam reveals no gallop and no friction rub.    No murmur heard.  Pulmonary/Chest: Effort normal and breath sounds normal. No respiratory distress. She exhibits no tenderness.   Abdominal: Soft. Bowel sounds are normal. She exhibits no distension. There is no guarding.   Musculoskeletal: She exhibits no edema or tenderness.   Lymphadenopathy:     She has no cervical adenopathy.   Neurological: She is alert and oriented to person, place, and time. No cranial nerve deficit.   Skin: Skin is warm and dry. She is not diaphoretic. No erythema. No pallor.   Psychiatric: She has a normal mood and affect. Her behavior is normal.   Patient less impulsive today.     Nursing note and vitals reviewed.      Fluids    Intake/Output Summary (Last 24 hours) at 03/14/19 1607  Last data filed at 03/14/19 0856   Gross per 24 hour   Intake                0 ml   Output              100 ml    Net             -100 ml       Laboratory  Recent Labs      03/12/19   0019  03/14/19   0022   WBC  7.2  5.8   RBC  4.91  4.68   HEMOGLOBIN  12.3  11.6*   HEMATOCRIT  39.7  37.5   MCV  80.9*  80.1*   MCH  25.1*  24.8*   MCHC  31.0*  30.9*   RDW  53.2*  52.3*   PLATELETCT  246  255   MPV  9.8  9.9     Recent Labs      03/12/19   0019  03/14/19   0022   SODIUM  132*  130*   POTASSIUM  3.6  3.5*   CHLORIDE  96  93*   CO2  24  29   GLUCOSE  99  102*   BUN  12  12   CREATININE  0.59  0.60   CALCIUM  9.8  9.5                   Imaging  CT-NEEDLE BX-MUSCLE RIGHT   Final Result      CT-guided aspiration of pus like material in the right gluteal muscle lesion.      EC-ECHOCARDIOGRAM COMPLETE W/O CONT   Final Result      OUTSIDE IMAGES-CT CHEST/ABDOMEN/PELVIS   Final Result      OUTSIDE IMAGES-DX CHEST   Final Result      [id number] FILM X-RAY   Final Result      OUTSIDE IMAGES-MR BRAIN   Final Result      OUTSIDE IMAGES-MR BRAIN   Final Result      EC-CHAS W/O CONT    (Results Pending)        Assessment/Plan  * Lesion of brain   Assessment & Plan    Metastatic cancer vs infectious etiology  D/w Dr Black for questionable brain mets, recommending IR Bx of psoas muscle   IR Bx of psoas muscle ordered - was abscess  Empiric treatment of infection     Encephalopathy   Assessment & Plan    Likely toxic metabolic from infection  Improved       Mass of iliopsoas muscle group   Assessment & Plan    Consulted Dr Black for brain mets, recommending IR Bx of psoas muscle   IR Bx of psoas muscle completed, fluid drained - abscess, no growth  Continue abx, culture without growth, ID consult.         Anemia   Assessment & Plan    Continue to monitor, transfuse for hemoglobin less than 7     RLS (restless legs syndrome)- (present on admission)   Assessment & Plan    Pramipexole       Diabetes mellitus type 2 in obese (HCC)- (present on admission)   Assessment & Plan    Glycated hemoglobin 6.3, 3 months ago  Well controlled   Short  acting insulin as needed   Accu-Checks, hypoglycemia protocol     Consider stopping accu-checks after Bx if still good control      History of breast cancer- (present on admission)   Assessment & Plan    Mets vs infection to brain  No known active breast cancer.       COPD (chronic obstructive pulmonary disease) (HCC)- (present on admission)   Assessment & Plan    Oxygen as needed, Respiratory protocol, Bronchodilators, Incentive spirometry      History of deep vein thrombosis- (present on admission)   Assessment & Plan    History of deep vein thrombosis   Was on Rivaroxaban as outpatient.     Held for Bx Mar 11, continue to hold if IC biopsy proves necessary  Lovenox daily for DVT prophylaxis while full AC on hold.         Essential hypertension- (present on admission)   Assessment & Plan    Restarted on losartan and furosemide with hold parameters.     GERD (gastroesophageal reflux disease)- (present on admission)   Assessment & Plan    Omeprazole     Chronic pain- (present on admission)   Assessment & Plan     Multifactorial  Likely secondary to abscess, fracture, etc.            VTE prophylaxis: scds

## 2019-03-15 ENCOUNTER — APPOINTMENT (OUTPATIENT)
Dept: CARDIOLOGY | Facility: MEDICAL CENTER | Age: 69
DRG: 981 | End: 2019-03-15
Attending: NURSE PRACTITIONER
Payer: MEDICARE

## 2019-03-15 ENCOUNTER — ANESTHESIA (OUTPATIENT)
Dept: CARDIOLOGY | Facility: MEDICAL CENTER | Age: 69
DRG: 981 | End: 2019-03-15
Payer: MEDICARE

## 2019-03-15 ENCOUNTER — ANESTHESIA EVENT (OUTPATIENT)
Dept: CARDIOLOGY | Facility: MEDICAL CENTER | Age: 69
DRG: 981 | End: 2019-03-15
Payer: MEDICARE

## 2019-03-15 ENCOUNTER — APPOINTMENT (OUTPATIENT)
Dept: CARDIOLOGY | Facility: MEDICAL CENTER | Age: 69
DRG: 981 | End: 2019-03-15
Attending: INTERNAL MEDICINE
Payer: MEDICARE

## 2019-03-15 LAB
BACTERIA WND AEROBE CULT: NORMAL
GRAM STN SPEC: NORMAL
LV EJECT FRACT  99904: 65
LV EJECT FRACT  99904: 65
SIGNIFICANT IND 70042: NORMAL
SITE SITE: NORMAL
SOURCE SOURCE: NORMAL

## 2019-03-15 PROCEDURE — 700102 HCHG RX REV CODE 250 W/ 637 OVERRIDE(OP): Performed by: HOSPITALIST

## 2019-03-15 PROCEDURE — 93325 DOPPLER ECHO COLOR FLOW MAPG: CPT | Mod: 26 | Performed by: INTERNAL MEDICINE

## 2019-03-15 PROCEDURE — 700105 HCHG RX REV CODE 258: Performed by: ANESTHESIOLOGY

## 2019-03-15 PROCEDURE — 93312 ECHO TRANSESOPHAGEAL: CPT | Mod: 26 | Performed by: INTERNAL MEDICINE

## 2019-03-15 PROCEDURE — 99232 SBSQ HOSP IP/OBS MODERATE 35: CPT | Performed by: INTERNAL MEDICINE

## 2019-03-15 PROCEDURE — 97110 THERAPEUTIC EXERCISES: CPT

## 2019-03-15 PROCEDURE — 700105 HCHG RX REV CODE 258: Performed by: INTERNAL MEDICINE

## 2019-03-15 PROCEDURE — 770004 HCHG ROOM/CARE - ONCOLOGY PRIVATE *

## 2019-03-15 PROCEDURE — 700102 HCHG RX REV CODE 250 W/ 637 OVERRIDE(OP): Performed by: INTERNAL MEDICINE

## 2019-03-15 PROCEDURE — 97530 THERAPEUTIC ACTIVITIES: CPT

## 2019-03-15 PROCEDURE — A9270 NON-COVERED ITEM OR SERVICE: HCPCS | Performed by: HOSPITALIST

## 2019-03-15 PROCEDURE — 700111 HCHG RX REV CODE 636 W/ 250 OVERRIDE (IP): Performed by: INTERNAL MEDICINE

## 2019-03-15 PROCEDURE — 700111 HCHG RX REV CODE 636 W/ 250 OVERRIDE (IP): Performed by: ANESTHESIOLOGY

## 2019-03-15 PROCEDURE — 700101 HCHG RX REV CODE 250: Performed by: ANESTHESIOLOGY

## 2019-03-15 PROCEDURE — 160002 HCHG RECOVERY MINUTES (STAT)

## 2019-03-15 PROCEDURE — 93325 DOPPLER ECHO COLOR FLOW MAPG: CPT

## 2019-03-15 PROCEDURE — 97116 GAIT TRAINING THERAPY: CPT

## 2019-03-15 PROCEDURE — A9270 NON-COVERED ITEM OR SERVICE: HCPCS | Performed by: INTERNAL MEDICINE

## 2019-03-15 PROCEDURE — 99233 SBSQ HOSP IP/OBS HIGH 50: CPT | Performed by: INTERNAL MEDICINE

## 2019-03-15 RX ORDER — HALOPERIDOL 5 MG/ML
1 INJECTION INTRAMUSCULAR
Status: DISCONTINUED | OUTPATIENT
Start: 2019-03-15 | End: 2019-03-15 | Stop reason: HOSPADM

## 2019-03-15 RX ORDER — OXYCODONE HCL 5 MG/5 ML
10 SOLUTION, ORAL ORAL
Status: DISCONTINUED | OUTPATIENT
Start: 2019-03-15 | End: 2019-03-15 | Stop reason: HOSPADM

## 2019-03-15 RX ORDER — IPRATROPIUM BROMIDE AND ALBUTEROL SULFATE 2.5; .5 MG/3ML; MG/3ML
3 SOLUTION RESPIRATORY (INHALATION)
Status: DISCONTINUED | OUTPATIENT
Start: 2019-03-15 | End: 2019-03-15 | Stop reason: HOSPADM

## 2019-03-15 RX ORDER — HYDROMORPHONE HYDROCHLORIDE 1 MG/ML
0.1 INJECTION, SOLUTION INTRAMUSCULAR; INTRAVENOUS; SUBCUTANEOUS
Status: DISCONTINUED | OUTPATIENT
Start: 2019-03-15 | End: 2019-03-15 | Stop reason: HOSPADM

## 2019-03-15 RX ORDER — HALOPERIDOL 5 MG/ML
5 INJECTION INTRAMUSCULAR EVERY 6 HOURS PRN
Status: DISCONTINUED | OUTPATIENT
Start: 2019-03-15 | End: 2019-04-12

## 2019-03-15 RX ORDER — HYDROMORPHONE HYDROCHLORIDE 1 MG/ML
0.2 INJECTION, SOLUTION INTRAMUSCULAR; INTRAVENOUS; SUBCUTANEOUS
Status: DISCONTINUED | OUTPATIENT
Start: 2019-03-15 | End: 2019-03-15 | Stop reason: HOSPADM

## 2019-03-15 RX ORDER — MEPERIDINE HYDROCHLORIDE 25 MG/ML
12.5 INJECTION INTRAMUSCULAR; INTRAVENOUS; SUBCUTANEOUS
Status: DISCONTINUED | OUTPATIENT
Start: 2019-03-15 | End: 2019-03-15 | Stop reason: HOSPADM

## 2019-03-15 RX ORDER — SODIUM CHLORIDE, SODIUM LACTATE, POTASSIUM CHLORIDE, CALCIUM CHLORIDE 600; 310; 30; 20 MG/100ML; MG/100ML; MG/100ML; MG/100ML
INJECTION, SOLUTION INTRAVENOUS
Status: DISCONTINUED | OUTPATIENT
Start: 2019-03-15 | End: 2019-03-15 | Stop reason: SURG

## 2019-03-15 RX ORDER — ONDANSETRON 2 MG/ML
4 INJECTION INTRAMUSCULAR; INTRAVENOUS
Status: DISCONTINUED | OUTPATIENT
Start: 2019-03-15 | End: 2019-03-15 | Stop reason: HOSPADM

## 2019-03-15 RX ORDER — HYDROMORPHONE HYDROCHLORIDE 1 MG/ML
0.4 INJECTION, SOLUTION INTRAMUSCULAR; INTRAVENOUS; SUBCUTANEOUS
Status: DISCONTINUED | OUTPATIENT
Start: 2019-03-15 | End: 2019-03-15 | Stop reason: HOSPADM

## 2019-03-15 RX ORDER — DIPHENHYDRAMINE HYDROCHLORIDE 50 MG/ML
12.5 INJECTION INTRAMUSCULAR; INTRAVENOUS
Status: DISCONTINUED | OUTPATIENT
Start: 2019-03-15 | End: 2019-03-15 | Stop reason: HOSPADM

## 2019-03-15 RX ORDER — OXYCODONE HCL 5 MG/5 ML
5 SOLUTION, ORAL ORAL
Status: DISCONTINUED | OUTPATIENT
Start: 2019-03-15 | End: 2019-03-15 | Stop reason: HOSPADM

## 2019-03-15 RX ORDER — SODIUM CHLORIDE, SODIUM LACTATE, POTASSIUM CHLORIDE, CALCIUM CHLORIDE 600; 310; 30; 20 MG/100ML; MG/100ML; MG/100ML; MG/100ML
INJECTION, SOLUTION INTRAVENOUS CONTINUOUS
Status: DISCONTINUED | OUTPATIENT
Start: 2019-03-15 | End: 2019-03-16

## 2019-03-15 RX ADMIN — LOSARTAN POTASSIUM 50 MG: 50 TABLET ORAL at 05:13

## 2019-03-15 RX ADMIN — PROPOFOL 50 MG: 10 INJECTION, EMULSION INTRAVENOUS at 11:33

## 2019-03-15 RX ADMIN — SENNOSIDES AND DOCUSATE SODIUM 2 TABLET: 8.6; 5 TABLET ORAL at 17:34

## 2019-03-15 RX ADMIN — OMEPRAZOLE 20 MG: 20 CAPSULE, DELAYED RELEASE ORAL at 05:13

## 2019-03-15 RX ADMIN — ENOXAPARIN SODIUM 40 MG: 100 INJECTION SUBCUTANEOUS at 05:13

## 2019-03-15 RX ADMIN — OXYCODONE HYDROCHLORIDE 2.5 MG: 5 TABLET ORAL at 05:13

## 2019-03-15 RX ADMIN — PROPOFOL 30 MG: 10 INJECTION, EMULSION INTRAVENOUS at 11:50

## 2019-03-15 RX ADMIN — VANCOMYCIN HYDROCHLORIDE 1000 MG: 100 INJECTION, POWDER, LYOPHILIZED, FOR SOLUTION INTRAVENOUS at 13:15

## 2019-03-15 RX ADMIN — SODIUM CHLORIDE, POTASSIUM CHLORIDE, SODIUM LACTATE AND CALCIUM CHLORIDE: 600; 310; 30; 20 INJECTION, SOLUTION INTRAVENOUS at 11:30

## 2019-03-15 RX ADMIN — CEFEPIME 2 G: 2 INJECTION, POWDER, FOR SOLUTION INTRAVENOUS at 05:58

## 2019-03-15 RX ADMIN — TRAMADOL HYDROCHLORIDE 50 MG: 50 TABLET, COATED ORAL at 16:19

## 2019-03-15 RX ADMIN — CEFEPIME 2 G: 2 INJECTION, POWDER, FOR SOLUTION INTRAVENOUS at 19:10

## 2019-03-15 RX ADMIN — FUROSEMIDE 40 MG: 40 TABLET ORAL at 05:13

## 2019-03-15 RX ADMIN — ACETAMINOPHEN 650 MG: 325 TABLET, FILM COATED ORAL at 23:56

## 2019-03-15 RX ADMIN — VANCOMYCIN HYDROCHLORIDE 1000 MG: 100 INJECTION, POWDER, LYOPHILIZED, FOR SOLUTION INTRAVENOUS at 02:47

## 2019-03-15 RX ADMIN — GABAPENTIN 300 MG: 300 CAPSULE ORAL at 17:34

## 2019-03-15 RX ADMIN — OXYCODONE HYDROCHLORIDE 5 MG: 5 TABLET ORAL at 14:43

## 2019-03-15 RX ADMIN — LIDOCAINE HYDROCHLORIDE 60 MG: 20 INJECTION, SOLUTION INFILTRATION; PERINEURAL at 11:30

## 2019-03-15 RX ADMIN — GABAPENTIN 300 MG: 300 CAPSULE ORAL at 05:13

## 2019-03-15 RX ADMIN — HALOPERIDOL LACTATE 5 MG: 5 INJECTION, SOLUTION INTRAMUSCULAR at 19:09

## 2019-03-15 RX ADMIN — TRAMADOL HYDROCHLORIDE 50 MG: 50 TABLET, COATED ORAL at 07:49

## 2019-03-15 RX ADMIN — FUROSEMIDE 40 MG: 40 TABLET ORAL at 17:34

## 2019-03-15 RX ADMIN — SODIUM CHLORIDE, POTASSIUM CHLORIDE, SODIUM LACTATE AND CALCIUM CHLORIDE: 600; 310; 30; 20 INJECTION, SOLUTION INTRAVENOUS at 12:45

## 2019-03-15 RX ADMIN — PROPOFOL 30 MG: 10 INJECTION, EMULSION INTRAVENOUS at 11:40

## 2019-03-15 RX ADMIN — POTASSIUM CHLORIDE 40 MEQ: 1500 TABLET, EXTENDED RELEASE ORAL at 05:14

## 2019-03-15 ASSESSMENT — COGNITIVE AND FUNCTIONAL STATUS - GENERAL
CLIMB 3 TO 5 STEPS WITH RAILING: A LOT
WALKING IN HOSPITAL ROOM: A LITTLE
MOVING TO AND FROM BED TO CHAIR: A LITTLE
SUGGESTED CMS G CODE MODIFIER MOBILITY: CK
MOBILITY SCORE: 17
STANDING UP FROM CHAIR USING ARMS: A LITTLE
TURNING FROM BACK TO SIDE WHILE IN FLAT BAD: A LITTLE
MOVING FROM LYING ON BACK TO SITTING ON SIDE OF FLAT BED: A LITTLE

## 2019-03-15 ASSESSMENT — ENCOUNTER SYMPTOMS
MYALGIAS: 1
INSOMNIA: 0
CLAUDICATION: 0
DEPRESSION: 0
NERVOUS/ANXIOUS: 0
CONSTIPATION: 0
FEVER: 0
BLURRED VISION: 0
DIARRHEA: 0
HEADACHES: 0
SHORTNESS OF BREATH: 0
SORE THROAT: 0
CHILLS: 0
DIZZINESS: 0
PHOTOPHOBIA: 0
WEAKNESS: 0
ABDOMINAL PAIN: 0
VOMITING: 0
SPEECH CHANGE: 0
NAUSEA: 0
COUGH: 0
TREMORS: 0
DOUBLE VISION: 0
HEARTBURN: 0
SENSORY CHANGE: 0

## 2019-03-15 ASSESSMENT — GAIT ASSESSMENTS
DISTANCE (FEET): 20
GAIT LEVEL OF ASSIST: MINIMAL ASSIST
ASSISTIVE DEVICE: FRONT WHEEL WALKER
DEVIATION: STEP TO;DECREASED BASE OF SUPPORT;BRADYKINETIC;DECREASED HEEL STRIKE;DECREASED TOE OFF;OTHER (COMMENT)

## 2019-03-15 ASSESSMENT — PAIN SCALES - GENERAL: PAIN_LEVEL: 0

## 2019-03-15 NOTE — THERAPY
"Physical Therapy Treatment completed.   Bed Mobility:  Supine to Sit: Minimal Assist  Transfers: Sit to Stand: Minimal Assist  Gait: Level Of Assist: Minimal Assist with Front-Wheel Walker       Plan of Care: Will benefit from Physical Therapy 3 times per week  Discharge Recommendations: Equipment: Will Continue to Assess for Equipment Needs. Recommend inpatient transitional care services for continued physical therapy services.      See \"Rehab Therapy-Acute\" Patient Summary Report for complete documentation.     Pt is progressing gradually with functional mobility and strength. Pt continues to be limited due to poor sequencing/motor planning, pain, impaired balance, and fear of falling. Pt was able to demonstrate Min A for all functional mobility w/FWW use. Pt demonstrates with consistent posterior leans upon standing, and requires Min A for correction and cues to bring COG over hips for appropriate standing. Pt able to ambulate with in room and tolerate ther-ex, however, is easily fatigued and requires multiple rest breaks to continue with session. Will continue to follow with recs for post acute therapy.   "

## 2019-03-15 NOTE — ANESTHESIA QCDR
2019 [facility] Clinical Data Registry (for Quality Improvement)     Postoperative nausea/vomiting risk protocol (Adult = 18 yrs and Pediatric 3-17 yrs)- (430 and 463)  General inhalation anesthetic (NOT TIVA) with PONV risk factors: No  Provision of anti-emetic therapy with at least 2 different classes of agents: N/A  Patient DID NOT receive anti-emetic therapy and reason is documented in Medical Record: N/A    Multimodal Pain Management- (AQI59)  Patient undergoing Elective Surgery (i.e. Outpatient, or ASC, or Prescheduled Surgery prior to Hospital Admission): No  Use of Multimodal Pain Management, two or more drugs and/or interventions, NOT including systemic opioids: N/A  Exception: Documented allergy to multiple classes of analgesics: N/A    PACU assessment of acute postoperative pain prior to Anesthesia Care End- Applies to Patients Age = 18- (ABG7)  Initial PACU pain score is which of the following: < 7/10  Patient unable to report pain score: N/A    Post-anesthetic transfer of care checklist/protocol to PACU/ICU- (426 and 427)  Upon conclusion of case, patient transferred to which of the following locations: PACU/Non-ICU  Use of transfer checklist/protocol: Yes  Exclusion: Service Performed in Patient Hospital Room (and thus did not require transfer): N/A    PACU Reintubation- (AQI31)  General anesthesia requiring endotracheal intubation (ETT) along with subsequent extubation in OR or PACU: No  Required reintubation in the PACU: N/A  Extubation was a planned trial documented in the medical record prior to removal of the original airway device: N/A    Unplanned admission to ICU related to anesthesia service up through end of PACU care- (MD51)  Unplanned admission to ICU (not initially anticipated at anesthesia start time): No

## 2019-03-15 NOTE — THERAPY
"Occupational Therapy Treatment completed with focus on ADLs, ADL transfers and patient education.  Functional Status:  Min A UB dressing, Min A UB dressing, Min A stand pivot txf w/ FWW  Plan of Care: Will benefit from Occupational Therapy 3 times per week  Discharge Recommendations:  Equipment Will Continue to Assess for Equipment Needs. Post-acute therapy Recommend inpatient transitional care services for continued occupational therapy services.     See \"Rehab Therapy-Acute\" Patient Summary Report for complete documentation.     Pt cooperative and pleasant during OT session. Pt w/ odd affect and appears to have poor insight to deficits. Cues for sequencing BADLs and functional mobility required throughout session. Pt not safe to return home at this time. Recommend DC to post acute placement for continued inpt therapy.   "

## 2019-03-15 NOTE — ANESTHESIA POSTPROCEDURE EVALUATION
Patient: Muriel Watkins    Procedure Summary     Date:  03/15/19 Room / Location:  [facility] IMAGING - CATH LAB [facility]    Anesthesia Start:  1133 Anesthesia Stop:  1206    Procedure:  EC-CHAS W/O CONT Diagnosis:      Scheduled Providers:   Responsible Provider:  David Dove M.D.    Anesthesia Type:  general ASA Status:  3          Final Anesthesia Type: general  Last vitals  BP   NIBP: 104/48 (03/15/19 1204)    Temp   36.5 °C (97.7 °F) (03/15/19 1204)    Pulse   Pulse: 98 (03/15/19 1204)   Resp        SpO2   100 % (03/15/19 1204)      Anesthesia Post Evaluation    Patient location during evaluation: PACU  Patient participation: complete - patient participated  Level of consciousness: awake and alert  Pain score: 0    Airway patency: patent  Anesthetic complications: no  Cardiovascular status: hemodynamically stable  Respiratory status: acceptable  Hydration status: euvolemic    PONV: none           Pain Rating Scale (NPRS): 1 - Hardly Notice Pain

## 2019-03-15 NOTE — PROGRESS NOTES
"[facility] Medicine Daily Progress Note    Date of Service  3/15/2019    Chief Complaint  68 y.o. female admitted 3/8/2019 with right hip pain, fever and abnormal brain mri.    Hospital Course    Patient started on empiric antibiotics given fever.  She had abnormal findings on MRI brain and CT showed \"lesion\" on right iliopsoas.  This lesion was biopsied and turned out to be abscess.  She has history of breast cancer and there is also concern of brain lesions being metastatic though their appearance is not typical of this.      Interval Problem Update  3/12 Discussed with Dr Turner for second opinion of brain lesions and based on appearance not able to r/o or r/i any diagnosis.  Given patient's presentation of confusion, hip pain and fever  - this is more supportive of infectious etiology rather than malignant.  Fluid from right gluteal area sent to micro and as of yet - no growth.  Continue zosyn for now.  3/13 Patient with slight improvement in mentation.  Blood cultures and abscess cultures are showing no growth at this time.  TTE being done while I examined patient - no evidence of vegetations on this test.  ID consultation pending - d/w Dr Garcia.  3/14 Patient feeling well today, her pain is present but not as bad as prior to admission.  She was able to follow the conversation today and is agreeable to move forward with the CHAS tomorrow.  I spoke with her brother, René, and updated him on condition yesterday afternoon also.  Will also have PICC placed in next few days as long as blood cultures remain negative as I expect a prolonged antibiotic course of treatment.  3/15 Patient without new complaints, states she needs help to move in bed.  CHAS showed no evidence of vegetations and regular diet resumed.  Culture remains negative and biopsy of brain lesion may prove needed - will watch through the weekend and if mentation does not continue to improve, will discuss with neurosurgery on " Monday.    Consultants/Specialty  none    Code Status  full    Disposition  tbd    Review of Systems  Review of Systems   Constitutional: Negative for chills and fever.   HENT: Negative for congestion and sore throat.    Eyes: Negative for blurred vision and photophobia.   Respiratory: Negative for cough and shortness of breath.    Cardiovascular: Negative for chest pain, claudication and leg swelling.   Gastrointestinal: Negative for abdominal pain, constipation, diarrhea, heartburn, nausea and vomiting.   Genitourinary: Negative for dysuria and hematuria.   Musculoskeletal: Positive for myalgias (hip pain). Negative for joint pain (left hip pain).   Skin: Negative for itching and rash.   Neurological: Negative for dizziness, sensory change, speech change, weakness and headaches.   Psychiatric/Behavioral: Negative for depression. The patient is not nervous/anxious and does not have insomnia.         Physical Exam  Temp:  [36.4 °C (97.6 °F)-37.3 °C (99.1 °F)] 37.2 °C (98.9 °F)  Pulse:  [] 102  Resp:  [17-20] 18  BP: (114-147)/(55-82) 128/73  SpO2:  [94 %-100 %] 95 %    Physical Exam   Constitutional: She is oriented to person, place, and time. She appears well-developed and well-nourished. No distress.   HENT:   Head: Normocephalic and atraumatic.   Eyes: Conjunctivae are normal. No scleral icterus.   Neck: Neck supple. No JVD present.   Cardiovascular: Normal rate, regular rhythm and normal heart sounds.  Exam reveals no gallop and no friction rub.    No murmur heard.  Pulmonary/Chest: Effort normal and breath sounds normal. No respiratory distress. She exhibits no tenderness.   Abdominal: Soft. Bowel sounds are normal. She exhibits no distension. There is no guarding.   Musculoskeletal: She exhibits no edema or tenderness.   Lymphadenopathy:     She has no cervical adenopathy.   Neurological: She is alert and oriented to person, place, and time. No cranial nerve deficit.   Skin: Skin is warm and dry. She is  not diaphoretic. No erythema. No pallor.   Psychiatric: She has a normal mood and affect. Her behavior is normal.   Nursing note and vitals reviewed.      Fluids    Intake/Output Summary (Last 24 hours) at 03/15/19 1404  Last data filed at 03/15/19 1315   Gross per 24 hour   Intake              540 ml   Output                0 ml   Net              540 ml       Laboratory  Recent Labs      03/14/19   0022   WBC  5.8   RBC  4.68   HEMOGLOBIN  11.6*   HEMATOCRIT  37.5   MCV  80.1*   MCH  24.8*   MCHC  30.9*   RDW  52.3*   PLATELETCT  255   MPV  9.9     Recent Labs      03/14/19   0022   SODIUM  130*   POTASSIUM  3.5*   CHLORIDE  93*   CO2  29   GLUCOSE  102*   BUN  12   CREATININE  0.60   CALCIUM  9.5                   Imaging  EC-CHAS W/O CONT   Final Result      CT-NEEDLE BX-MUSCLE RIGHT   Final Result      CT-guided aspiration of pus like material in the right gluteal muscle lesion.      EC-ECHOCARDIOGRAM COMPLETE W/O CONT   Final Result      OUTSIDE IMAGES-CT CHEST/ABDOMEN/PELVIS   Final Result      OUTSIDE IMAGES-DX CHEST   Final Result      [id number] FILM X-RAY   Final Result      OUTSIDE IMAGES-MR BRAIN   Final Result      OUTSIDE IMAGES-MR BRAIN   Final Result      EC-CHAS W/O CONT    (Results Pending)        Assessment/Plan  * Lesion of brain   Assessment & Plan    Metastatic cancer vs infectious etiology  D/w Dr Black for questionable brain mets, recommending IR Bx of psoas muscle   IR Bx of psoas muscle ordered - was abscess  Empiric treatment of infection  TTE and CHAS negative for vegetations.     Encephalopathy   Assessment & Plan    Likely toxic metabolic from infection  Improved       Mass of iliopsoas muscle group   Assessment & Plan    Consulted Dr Black for brain mets, recommending IR Bx of psoas muscle   IR Bx of psoas muscle completed, fluid drained - abscess, no growth  Continue abx, culture without growth, ID consult.         Anemia   Assessment & Plan    Continue to monitor, transfuse for  hemoglobin less than 7     RLS (restless legs syndrome)- (present on admission)   Assessment & Plan    Pramipexole       Diabetes mellitus type 2 in obese (HCC)- (present on admission)   Assessment & Plan    Glycated hemoglobin 6.3, 3 months ago  Well controlled   Short acting insulin as needed   Accu-Checks, hypoglycemia protocol     Consider stopping accu-checks after Bx if still good control      History of breast cancer- (present on admission)   Assessment & Plan    Mets vs infection to brain  No known active breast cancer.       COPD (chronic obstructive pulmonary disease) (HCC)- (present on admission)   Assessment & Plan    Oxygen as needed, Respiratory protocol, Bronchodilators, Incentive spirometry      History of deep vein thrombosis- (present on admission)   Assessment & Plan    History of deep vein thrombosis   Was on Rivaroxaban as outpatient.     Held for Bx Mar 11, continue to hold if IC biopsy proves necessary  Lovenox daily for DVT prophylaxis while full AC on hold.         Essential hypertension- (present on admission)   Assessment & Plan    Restarted on losartan and furosemide with hold parameters.     GERD (gastroesophageal reflux disease)- (present on admission)   Assessment & Plan    Omeprazole     Chronic pain- (present on admission)   Assessment & Plan     Multifactorial  Likely secondary to abscess, fracture, etc.            VTE prophylaxis: scds

## 2019-03-15 NOTE — PROGRESS NOTES
Assumed care of patient at 1900. Pt is A&Ox4, intermittent confusion. Bed alarm on. Pt reports 8/10 pain, medicated per MAR. Denies nausea. PIV patent and infusing. NPO at midnight. No further needs at this time. Call light within reach, will continue to round hourly.

## 2019-03-15 NOTE — PROGRESS NOTES
Pharmacy Kinetics 68 y.o. female on vancomycin day # 3 3/15/2019    Currently on Vancomycin 1000 mg iv q12hr (0000, 1200)    Indication for Treatment: CNS infection concern and iliopsoas abscess    Pertinent history per medical record: Admitted on 3/8/2019 for AMS, SOB and hip pain. Pt has history of breast cancer, was found to have metastatic brain lesions on MRI at outside facility. Per ID note - primary concern is for metastatic disease, but do agree with pursuing an infectious workup as the lesions are not amenable to biopsy; wait for final culture results from iliopsoas lesion with further recommendations per culture results and clinical course. Lesion was biopsied 3/14 and turned out to be abscess.    Other antibiotics:   Cefepime 2g iv q12h    Allergies: Patient has no known allergies.     Concerns for renal function: obesity, hypermetabolic state (SIRS)    Pertinent cultures to date:   3/11: R gluteal muscle - neg  3/12: blood cx x2 - NG    Recent Labs      19   0022   WBC  5.8   NEUTSPOLYS  68.50     Recent Labs      19   0022   BUN  12   CREATININE  0.60   ALBUMIN  3.3     Recent Labs      19   1049   VANCOTROUGH  20.4*     Intake/Output Summary (Last 24 hours) at 03/15/19 0748  Last data filed at 19 0856   Gross per 24 hour   Intake                0 ml   Output              100 ml   Net             -100 ml      Blood pressure 147/72, pulse 99, temperature 37.1 °C (98.8 °F), temperature source Oral, resp. rate 18, height 1.524 m (5'), weight 62.9 kg (138 lb 10.7 oz), SpO2 95 %, not currently breastfeeding. Temp (24hrs), Av.9 °C (98.5 °F), Min:36.4 °C (97.6 °F), Max:37.3 °C (99.2 °F)    A/P   1. Vancomycin dose change: none, continue 1000 mg iv q12h (0000, 1200)  2. Next vancomycin level: ~1-2 days (not scheduled yet)  3. Goal trough: 18-22mcg/mL  4. Comments: No new labs this am to assess SCr, urine output inadequate per chart review. As trough was in therapeutic range  yesterday, will draw next trough in ~1-2 days and adjust dose if needed.    Martha Pradhan, Pharmacy Intern

## 2019-03-15 NOTE — PROGRESS NOTES
Infectious Disease Progress Note    Author: Karen Garcia M.D. Date & Time of service: 3/15/2019  12:25 PM    Chief Complaint:  Multiple brain lesions  Iliopsoas lesion    Interval History:  68-year-old female with history of diabetes and breast cancer, admitted 3/8/2019 with abdominal pain, iliopsoas lesion, and an abnormal MRI with multiple brain lesions.  3/14 AF (99.2) WBC 5.8 more awake today as compared to yesterday but not oriented-denies pain, following commands. ECHO reviewed  3/15 AF (99.1) no WBC today Had CHAS today    Labs Reviewed, Medications Reviewed     Review of Systems:  Review of Systems   Constitutional: Negative for chills and fever.   HENT: Negative for hearing loss.    Eyes: Negative for blurred vision and double vision.   Respiratory: Negative for cough.    Cardiovascular: Negative for chest pain.   Skin: Negative for rash.   Neurological: Negative for dizziness, tremors, speech change and headaches.   Psychiatric/Behavioral: Negative for depression. The patient is not nervous/anxious.    All other systems reviewed and are negative.      Hemodynamics:  Temp (24hrs), Av.8 °C (98.3 °F), Min:36.4 °C (97.6 °F), Max:37.3 °C (99.1 °F)  Temperature: 36.5 °C (97.7 °F)  Pulse  Av  Min: 82  Max: 125  Blood Pressure : 114/55, NIBP: 110/68       Physical Exam:  Physical Exam   Constitutional: She appears well-developed.   HENT:   Mouth/Throat: No oropharyngeal exudate.   Good dentition   Eyes: Pupils are equal, round, and reactive to light. EOM are normal.   Neck: Neck supple. No JVD present.   Cardiovascular: Normal rate and intact distal pulses.    Murmur heard.  Pulmonary/Chest: Effort normal. No stridor. No respiratory distress. She exhibits no tenderness.   Abdominal: Soft. Bowel sounds are normal. There is no rebound and no guarding.   Musculoskeletal: She exhibits no edema, tenderness or deformity.   Lymphadenopathy:     She has no cervical adenopathy.   Neurological: She is alert.  No cranial nerve deficit. Coordination normal.   Skin: Skin is warm. No rash noted. She is not diaphoretic.   Nursing note and vitals reviewed.      Meds:    Current Facility-Administered Medications:   •  LR  •  fentaNYL **OR** fentaNYL  •  HYDROmorphone **OR** HYDROmorphone **OR** HYDROmorphone  •  oxyCODONE **OR** oxyCODONE  •  meperidine  •  ondansetron  •  haloperidol lactate  •  diphenhydrAMINE  •  ipratropium-albuterol  •  [MAR Hold] MD Alert...Vancomycin per Pharmacy  •  [MAR Hold] cefepime  •  [MAR Hold] enoxaparin (LOVENOX) injection  •  [MAR Hold] vancomycin  •  [MAR Hold] pramipexole  •  [MAR Hold] potassium chloride SA  •  [MAR Hold] furosemide  •  [MAR Hold] tramadol  •  [MAR Hold] losartan  •  [MAR Hold] gabapentin  •  [MAR Hold] omeprazole  •  [MAR Hold] polyethylene glycol/lytes  •  [MAR Hold] senna-docusate **AND** [MAR Hold] polyethylene glycol/lytes **AND** [MAR Hold] magnesium hydroxide **AND** [MAR Hold] bisacodyl  •  [MAR Hold] Respiratory Care per Protocol  •  [MAR Hold] Respiratory Care per Protocol  •  [MAR Hold] acetaminophen  •  Notify provider if pain remains uncontrolled **AND** Use the numeric rating scale (NRS-11) on regular floors and Critical-Care Pain Observation Tool (CPOT) on ICUs/Trauma to assess pain **AND** Pulse Ox (Oximetry) **AND** [MAR Hold] Pharmacy Consult Request **AND** If patient difficult to arouse and/or has respiratory depression, stop any opiates that are currently infusing and call a Rapid Response. **AND** [MAR Hold] oxyCODONE immediate-release **AND** [MAR Hold] oxyCODONE immediate-release **AND** [MAR Hold] morphine injection  •  [MAR Hold] ondansetron  •  [MAR Hold] ondansetron    Labs:  Recent Labs      03/14/19   0022   WBC  5.8   RBC  4.68   HEMOGLOBIN  11.6*   HEMATOCRIT  37.5   MCV  80.1*   MCH  24.8*   RDW  52.3*   PLATELETCT  255   MPV  9.9   NEUTSPOLYS  68.50   LYMPHOCYTES  19.00*   MONOCYTES  10.20   EOSINOPHILS  1.70   BASOPHILS  0.30     Recent  Labs      03/14/19   0022   SODIUM  130*   POTASSIUM  3.5*   CHLORIDE  93*   CO2  29   GLUCOSE  102*   BUN  12     Recent Labs      03/14/19   0022   ALBUMIN  3.3   TBILIRUBIN  0.6   ALKPHOSPHAT  427*   TOTPROTEIN  7.5   ALTSGPT  18   ASTSGOT  36   CREATININE  0.60       Imaging:  Ct-needle Bx-muscle Right    Result Date: 3/13/2019  3/11/2019 4:44 PM HISTORY/REASON FOR EXAM:  Right gluteal hypodense lesion. Moderate sedation was administered with continuous monitoring of the patient under the direct supervision of  Medications: 2 mg of Versed and 200 mcg of fentanyl Total sedation time 60 minutes Procedure: After the informed consent the patient was placed on the CT table on prone position. Localization CT scan was obtained. It demonstrates hypodense area in the right gluteus muscle. Subsequently a 17-gauge needle was advanced into the lesion. 20  mL of pus was aspirated. The materials were sent to the pathology. The patient tolerated the procedure without any immediate competition.     CT-guided aspiration of pus like material in the right gluteal muscle lesion.    Ec-echocardiogram Complete W/o Cont    Result Date: 3/13/2019  Transthoracic Echo Report Echocardiography Laboratory CONCLUSIONS No prior study is available for comparison. Normal left ventricular systolic function. Left ventricular ejection fraction is visually estimated to be 65%. Normal diastolic function. Normal inferior vena cava size and inspiratory collapse. Aortic sclerosis without stenosis. Estimated right ventricular systolic pressure  is 33 mmHg. No obvious valvular vegetations are noted on a decent quality study.  If further valvular assessment is clinically indicated, consider transesophageal echocardiogram. SAM,  LV EF:  65    % FINDINGS Left Ventricle Normal left ventricular size and systolic function. Normal left ventricular wall thickness. Left ventricular ejection fraction is visually estimated to be 65%. Normal  "diastolic function. Right Ventricle Normal right ventricular size and systolic function. Right Atrium Normal right atrial size. Normal inferior vena cava size and inspiratory collapse. Left Atrium Normal left atrial size. Mitral Valve Structurally normal mitral valve without significant stenosis or regurgitation. Aortic Valve Aortic sclerosis without stenosis. No aortic insufficiency. Tricuspid Valve Structurally normal tricuspid valve. Mild tricuspid regurgitation. Right atrial pressure is estimated to be 3 mmHg. Estimated right ventricular systolic pressure  is 33 mmHg. Pulmonic Valve The pulmonic valve is not well visualized. No pulmonic insufficiency. Pericardium Normal pericardium without effusion. Aorta The aortic root is normal.  Ascending aorta diameter is 3.0 cm. Claire Tripathi MD (Electronically Signed) Final Date:     13 March 2019                 14:38      Micro:  Results     Procedure Component Value Units Date/Time    CULTURE WOUND W/ GRAM STAIN [[id number]] Collected:  03/11/19 1800    Order Status:  Completed Specimen:  Wound Updated:  03/15/19 0743     Significant Indicator NEG     Source WND     Site Right Gluteal Muscles     Culture Result Wound No growth at 4 days     Gram Stain Result Moderate WBCs.  No organisms seen.      BLOOD CULTURE [[id number]] Collected:  03/12/19 1826    Order Status:  Completed Specimen:  Blood from Peripheral Updated:  03/13/19 0748     Significant Indicator NEG     Source BLD     Site PERIPHERAL     Blood Culture No Growth    Note: Blood cultures are incubated for 5 days and  are monitored continuously.Positive blood cultures  are called to the RN and reported as soon as  they are identified.      Narrative:       Per Hospital Policy: Only change Specimen Src: to \"Line\" if  specified by physician order.    BLOOD CULTURE [[id number]] Collected:  03/12/19 1826    Order Status:  Completed Specimen:  Blood from Peripheral Updated:  03/13/19 0748     Significant Indicator NEG " "    Source BLD     Site PERIPHERAL     Blood Culture No Growth    Note: Blood cultures are incubated for 5 days and  are monitored continuously.Positive blood cultures  are called to the RN and reported as soon as  they are identified.      Narrative:       Per Hospital Policy: Only change Specimen Src: to \"Line\" if  specified by physician order.    URINALYSIS [[id number]]  (Abnormal) Collected:  03/13/19 0450    Order Status:  Completed Specimen:  Urine from Urine, Clean Catch Updated:  03/13/19 0503     Color Yellow     Character Clear     Specific Gravity 1.015     Ph 5.5     Glucose Negative mg/dL      Ketones Negative mg/dL      Protein Negative mg/dL      Bilirubin Negative     Urobilinogen, Urine 0.2     Nitrite Negative     Leukocyte Esterase Trace (A)     Occult Blood Negative     Micro Urine Req Microscopic    Narrative:       Collected By:[id number] CINDY VINES    URINALYSIS [[id number]] Collected:  03/13/19 0450    Order Status:  Canceled Specimen:  Urine from Urine, Clean Catch     GRAM STAIN [[id number]] Collected:  03/11/19 1800    Order Status:  Completed Specimen:  Wound Updated:  03/12/19 0620     Significant Indicator .     Source WND     Site Right Gluteal Muscles     Gram Stain Result Moderate WBCs.  No organisms seen.      CULTURE TISSUE W/ GRM STAIN [[id number]]     Order Status:  No result Specimen:  Tissue from Other Tissue           Assessment:  Active Hospital Problems    Diagnosis   • *Lesion of brain [G93.9]   • Mass of iliopsoas muscle group [M62.89]   • Encephalopathy [G93.40]   • Hyponatremia [E87.1]   • History of breast cancer [Z85.3]   • Diabetes mellitus type 2 in obese (HCC) [E11.69, E66.9]       Plan:  Multiple brain lesions, ongoing work up  Fever resolved  No current leukocytosis  Persistent AMS  MRI with scattered small lesions incl aaron  TTE neg; CHAS neg  Bcxs neg  Biopsy lesions if feasible    Iliopsoas mass, ontretament  CT + 2.6 cm lesion in the right iliopsoas region as well " as a 2.8x2.1 cm lesion in the right gluteal musculature   S/p aspiration +WBCs  Cx neg to date  Continue vanco and cefepime for now  Monitor renal function closely while on vanco    H/o breast cancer  Markedly elevated alk phos  Recommend continued diaz for mets    Diabetes  Keep BS under 150 to help control current infection  Current BS     DW Cards  I have performed a physical exam and reviewed and updated ROS as of today.  In review of yesterday's note dated 3/14/2019, there are no changes except as documented above.

## 2019-03-15 NOTE — DISCHARGE PLANNING
Anticipated Discharge Disposition: SNF    Action: Met with pt at bedside to obtain SNF choice. Pt slightly confused, unable to state where she lives and unable to discuss SNF. She requested LSW discuss with her brother René.   Called René at # listed on facesheet. He expressed it's very important to him that pt be transferred to SNF facility close to him. His preference is [place], [place] and surrounding [facility] such as [facility]. He was agreeable to sending referrals to [facility]F's. LSW emailed him Medicare ratings to review in case referral needs to be expanded. He understands transport will likely need to be paid for out of pocket.   Choice form completed and faxed to [facility]    Barriers to Discharge: SNF acceptance    Plan: F/U with SNF's

## 2019-03-15 NOTE — OR NURSING
Pt arrived to PPU with anesthesiologist and Cath lab RN. AAOx3. VSS. Denies nausea. 100% on 6L mask, will titrate down.

## 2019-03-15 NOTE — DIETARY
Brief Nutrition Note:  Patient is eating well on a regular diet (%).   Was briefly NPO for CHAS, but was eating well prior to that and now regular diet resumed.  Stand-up scale weight obtained and patient is 18.8 kg below stated weight.  BMI 27 using stand-up weight and patient is overweight based on BMI.  She is also receiving Boost BID.  Nutrition Representative will continue to see her for ongoing meal and snack preferences.  RD following for adequacy of PO intake.

## 2019-03-15 NOTE — CARE PLAN
Problem: Nutritional:  Goal: Achieve adequate nutritional intake  Patient will consume 50% of meals   Outcome: MET Date Met: 03/15/19

## 2019-03-15 NOTE — ANESTHESIA PREPROCEDURE EVALUATION
CHAS to assess cardiac etiology for underlying medical condition.     Relevant Problems   (+) COPD (chronic obstructive pulmonary disease) (HCC)   (+) Diabetes mellitus type 2 in obese (HCC)   (+) Essential hypertension   (+) GERD (gastroesophageal reflux disease)       Physical Exam    Airway   Mallampati: II  TM distance: >3 FB  Neck ROM: full       Cardiovascular - normal exam  Rhythm: regular  Rate: normal  (-) murmur     Dental - normal exam         Pulmonary - normal exam  Breath sounds clear to auscultation     Abdominal    Neurological - normal exam                 Anesthesia Plan    ASA 3   ASA physical status 3 criteria: other (comment) and COPD - poorly controlled    Plan - general       Airway plan will be mask      Plan Factors:   Patient was not previously instructed to abstain from smoking on day of procedure.  Patient did not smoke on day of procedure.    Induction: intravenous      Pertinent diagnostic labs and testing reviewed    Informed Consent:    Anesthetic plan and risks discussed with patient.

## 2019-03-15 NOTE — ANESTHESIA TIME REPORT
Anesthesia Start and Stop Event Times     Date Time Event    3/15/2019 1133 Anesthesia Start     1206 Anesthesia Stop        Responsible Staff  03/15/19    Name Role Begin End    David Dove M.D. Anesth 1133 1206        Post op Diagnosis  Sepsis (HCC)    Premium Reason  Non-Premium      Exception Times    Start Time             End Time                    Dr. Lexii Bird

## 2019-03-15 NOTE — PROGRESS NOTES
Assumed care of pt @0700. Bedside report received. Pt AOX 3. Pt disoriented to time, confused at times. Pt complains about pain. Ultram 50 mg given. Pt denies nausea and SOB. Pt on 02 saturating above 90%. Pt NPO awaiting CHAS. PIV patent with positive blood return. Pt up with 1 p assist and FWW. Pt worked with PT. Last Bm 03/13. Fall precaution in place. POC discussed with pt, all questions answered at this time. Pt makes needs known, call light within reach, hourly rounding in place.

## 2019-03-15 NOTE — CARE PLAN
Problem: Safety  Goal: Will remain free from falls    Intervention: Assess risk factors for falls  Pt is A&Ox4, intermittent confusion. Bed alarm on. Treaded socks in place. Bed locked in lowest position.       Problem: Pain Management  Goal: Pain level will decrease to patient's comfort goal    Intervention: Follow pain managment plan developed in collaboration with patient and Interdisciplinary Team  Pt reports pain using 0-10 scale, medicated per MAR.

## 2019-03-15 NOTE — CARE PLAN
Problem: Mobility  Goal: Risk for activity intolerance will decrease  Outcome: PROGRESSING AS EXPECTED  Pt worked today with PT. Pt up with 1 p assist and FWW.     Problem: Pain Management  Goal: Pain level will decrease to patient's comfort goal  Outcome: PROGRESSING AS EXPECTED  Pt complains about pain. Ultram 50 mg given.     Problem: Urinary Elimination:  Goal: Ability to reestablish a normal urinary elimination pattern will improve  Outcome: PROGRESSING AS EXPECTED  Pt incontinent at times. Frequent linens change/ checks in place.

## 2019-03-16 LAB
ALBUMIN SERPL BCP-MCNC: 3.5 G/DL (ref 3.2–4.9)
ALBUMIN SERPL BCP-MCNC: 3.8 G/DL (ref 3.2–4.9)
ALBUMIN/GLOB SERPL: 0.7 G/DL
ALBUMIN/GLOB SERPL: 0.8 G/DL
ALP SERPL-CCNC: 381 U/L (ref 30–99)
ALP SERPL-CCNC: 467 U/L (ref 30–99)
ALT SERPL-CCNC: 16 U/L (ref 2–50)
ALT SERPL-CCNC: 18 U/L (ref 2–50)
ANION GAP SERPL CALC-SCNC: 11 MMOL/L (ref 0–11.9)
ANION GAP SERPL CALC-SCNC: 12 MMOL/L (ref 0–11.9)
AST SERPL-CCNC: 36 U/L (ref 12–45)
AST SERPL-CCNC: 38 U/L (ref 12–45)
BASOPHILS # BLD AUTO: 0.9 % (ref 0–1.8)
BASOPHILS # BLD: 0.07 K/UL (ref 0–0.12)
BILIRUB SERPL-MCNC: 0.7 MG/DL (ref 0.1–1.5)
BILIRUB SERPL-MCNC: 0.7 MG/DL (ref 0.1–1.5)
BUN SERPL-MCNC: 13 MG/DL (ref 8–22)
BUN SERPL-MCNC: 20 MG/DL (ref 8–22)
CALCIUM SERPL-MCNC: 10.2 MG/DL (ref 8.5–10.5)
CALCIUM SERPL-MCNC: 10.2 MG/DL (ref 8.5–10.5)
CHLORIDE SERPL-SCNC: 93 MMOL/L (ref 96–112)
CHLORIDE SERPL-SCNC: 95 MMOL/L (ref 96–112)
CO2 SERPL-SCNC: 27 MMOL/L (ref 20–33)
CO2 SERPL-SCNC: 27 MMOL/L (ref 20–33)
CREAT SERPL-MCNC: 0.73 MG/DL (ref 0.5–1.4)
CREAT SERPL-MCNC: 0.92 MG/DL (ref 0.5–1.4)
EKG IMPRESSION: NORMAL
EOSINOPHIL # BLD AUTO: 0.06 K/UL (ref 0–0.51)
EOSINOPHIL NFR BLD: 0.8 % (ref 0–6.9)
ERYTHROCYTE [DISTWIDTH] IN BLOOD BY AUTOMATED COUNT: 51.9 FL (ref 35.9–50)
GLOBULIN SER CALC-MCNC: 4.5 G/DL (ref 1.9–3.5)
GLOBULIN SER CALC-MCNC: 4.9 G/DL (ref 1.9–3.5)
GLUCOSE SERPL-MCNC: 119 MG/DL (ref 65–99)
GLUCOSE SERPL-MCNC: 197 MG/DL (ref 65–99)
HCT VFR BLD AUTO: 40.6 % (ref 37–47)
HGB BLD-MCNC: 12.5 G/DL (ref 12–16)
IMM GRANULOCYTES # BLD AUTO: 0.04 K/UL (ref 0–0.11)
IMM GRANULOCYTES NFR BLD AUTO: 0.5 % (ref 0–0.9)
LACTATE BLD-SCNC: 1.7 MMOL/L (ref 0.5–2)
LYMPHOCYTES # BLD AUTO: 0.79 K/UL (ref 1–4.8)
LYMPHOCYTES NFR BLD: 10 % (ref 22–41)
MCH RBC QN AUTO: 24.8 PG (ref 27–33)
MCHC RBC AUTO-ENTMCNC: 30.8 G/DL (ref 33.6–35)
MCV RBC AUTO: 80.4 FL (ref 81.4–97.8)
MONOCYTES # BLD AUTO: 0.87 K/UL (ref 0–0.85)
MONOCYTES NFR BLD AUTO: 11 % (ref 0–13.4)
NEUTROPHILS # BLD AUTO: 6.06 K/UL (ref 2–7.15)
NEUTROPHILS NFR BLD: 76.8 % (ref 44–72)
NRBC # BLD AUTO: 0 K/UL
NRBC BLD-RTO: 0 /100 WBC
PLATELET # BLD AUTO: 260 K/UL (ref 164–446)
PMV BLD AUTO: 9.7 FL (ref 9–12.9)
POTASSIUM SERPL-SCNC: 3.1 MMOL/L (ref 3.6–5.5)
POTASSIUM SERPL-SCNC: 3.5 MMOL/L (ref 3.6–5.5)
PROT SERPL-MCNC: 8.3 G/DL (ref 6–8.2)
PROT SERPL-MCNC: 8.4 G/DL (ref 6–8.2)
RBC # BLD AUTO: 5.05 M/UL (ref 4.2–5.4)
SODIUM SERPL-SCNC: 131 MMOL/L (ref 135–145)
SODIUM SERPL-SCNC: 134 MMOL/L (ref 135–145)
VANCOMYCIN TROUGH SERPL-MCNC: 33.7 UG/ML (ref 10–20)
WBC # BLD AUTO: 7.9 K/UL (ref 4.8–10.8)

## 2019-03-16 PROCEDURE — 700102 HCHG RX REV CODE 250 W/ 637 OVERRIDE(OP): Performed by: INTERNAL MEDICINE

## 2019-03-16 PROCEDURE — 700102 HCHG RX REV CODE 250 W/ 637 OVERRIDE(OP): Performed by: HOSPITALIST

## 2019-03-16 PROCEDURE — 93010 ELECTROCARDIOGRAM REPORT: CPT | Performed by: INTERNAL MEDICINE

## 2019-03-16 PROCEDURE — A9270 NON-COVERED ITEM OR SERVICE: HCPCS | Performed by: INTERNAL MEDICINE

## 2019-03-16 PROCEDURE — A9270 NON-COVERED ITEM OR SERVICE: HCPCS | Performed by: HOSPITALIST

## 2019-03-16 PROCEDURE — 93005 ELECTROCARDIOGRAM TRACING: CPT | Performed by: INTERNAL MEDICINE

## 2019-03-16 PROCEDURE — 700105 HCHG RX REV CODE 258: Performed by: INTERNAL MEDICINE

## 2019-03-16 PROCEDURE — 770004 HCHG ROOM/CARE - ONCOLOGY PRIVATE *

## 2019-03-16 PROCEDURE — 36415 COLL VENOUS BLD VENIPUNCTURE: CPT

## 2019-03-16 PROCEDURE — 99232 SBSQ HOSP IP/OBS MODERATE 35: CPT | Performed by: INTERNAL MEDICINE

## 2019-03-16 PROCEDURE — 85025 COMPLETE CBC W/AUTO DIFF WBC: CPT

## 2019-03-16 PROCEDURE — 80053 COMPREHEN METABOLIC PANEL: CPT

## 2019-03-16 PROCEDURE — 83605 ASSAY OF LACTIC ACID: CPT

## 2019-03-16 PROCEDURE — 80202 ASSAY OF VANCOMYCIN: CPT

## 2019-03-16 PROCEDURE — 700111 HCHG RX REV CODE 636 W/ 250 OVERRIDE (IP): Performed by: INTERNAL MEDICINE

## 2019-03-16 PROCEDURE — 99233 SBSQ HOSP IP/OBS HIGH 50: CPT | Performed by: INTERNAL MEDICINE

## 2019-03-16 RX ORDER — POTASSIUM CHLORIDE 20 MEQ/1
40 TABLET, EXTENDED RELEASE ORAL 2 TIMES DAILY
Status: DISCONTINUED | OUTPATIENT
Start: 2019-03-16 | End: 2019-03-30

## 2019-03-16 RX ORDER — IBUPROFEN 400 MG/1
600 TABLET ORAL EVERY 6 HOURS PRN
Status: DISCONTINUED | OUTPATIENT
Start: 2019-03-16 | End: 2019-04-12

## 2019-03-16 RX ORDER — LABETALOL 200 MG/1
100 TABLET, FILM COATED ORAL EVERY 8 HOURS PRN
Status: DISCONTINUED | OUTPATIENT
Start: 2019-03-16 | End: 2019-04-12

## 2019-03-16 RX ADMIN — VANCOMYCIN HYDROCHLORIDE 1000 MG: 100 INJECTION, POWDER, LYOPHILIZED, FOR SOLUTION INTRAVENOUS at 01:33

## 2019-03-16 RX ADMIN — POLYETHYLENE GLYCOL 3350 1 PACKET: 17 POWDER, FOR SOLUTION ORAL at 08:38

## 2019-03-16 RX ADMIN — LABETALOL HCL 100 MG: 200 TABLET, FILM COATED ORAL at 01:31

## 2019-03-16 RX ADMIN — POTASSIUM CHLORIDE 40 MEQ: 1500 TABLET, EXTENDED RELEASE ORAL at 17:38

## 2019-03-16 RX ADMIN — FUROSEMIDE 40 MG: 40 TABLET ORAL at 05:16

## 2019-03-16 RX ADMIN — SENNOSIDES AND DOCUSATE SODIUM 2 TABLET: 8.6; 5 TABLET ORAL at 05:16

## 2019-03-16 RX ADMIN — ENOXAPARIN SODIUM 40 MG: 100 INJECTION SUBCUTANEOUS at 05:16

## 2019-03-16 RX ADMIN — OMEPRAZOLE 20 MG: 20 CAPSULE, DELAYED RELEASE ORAL at 05:17

## 2019-03-16 RX ADMIN — FUROSEMIDE 40 MG: 40 TABLET ORAL at 17:38

## 2019-03-16 RX ADMIN — CEFEPIME 2 G: 2 INJECTION, POWDER, FOR SOLUTION INTRAVENOUS at 05:15

## 2019-03-16 RX ADMIN — LOSARTAN POTASSIUM 50 MG: 50 TABLET ORAL at 05:17

## 2019-03-16 RX ADMIN — VANCOMYCIN HYDROCHLORIDE 1000 MG: 100 INJECTION, POWDER, LYOPHILIZED, FOR SOLUTION INTRAVENOUS at 12:22

## 2019-03-16 RX ADMIN — IBUPROFEN 600 MG: 400 TABLET, FILM COATED ORAL at 01:31

## 2019-03-16 RX ADMIN — POTASSIUM CHLORIDE 40 MEQ: 1500 TABLET, EXTENDED RELEASE ORAL at 05:17

## 2019-03-16 RX ADMIN — MAGNESIUM HYDROXIDE 30 ML: 400 SUSPENSION ORAL at 17:38

## 2019-03-16 RX ADMIN — PRAMIPEXOLE DIHYDROCHLORIDE 0.25 MG: 0.25 TABLET ORAL at 08:40

## 2019-03-16 RX ADMIN — SENNOSIDES AND DOCUSATE SODIUM 2 TABLET: 8.6; 5 TABLET ORAL at 17:38

## 2019-03-16 RX ADMIN — CEFEPIME 2 G: 2 INJECTION, POWDER, FOR SOLUTION INTRAVENOUS at 17:34

## 2019-03-16 RX ADMIN — GABAPENTIN 300 MG: 300 CAPSULE ORAL at 05:17

## 2019-03-16 RX ADMIN — TRAMADOL HYDROCHLORIDE 50 MG: 50 TABLET, COATED ORAL at 05:17

## 2019-03-16 RX ADMIN — GABAPENTIN 300 MG: 300 CAPSULE ORAL at 17:38

## 2019-03-16 ASSESSMENT — ENCOUNTER SYMPTOMS
DIZZINESS: 0
ABDOMINAL PAIN: 0
WEAKNESS: 0
FEVER: 0
VOMITING: 0
MYALGIAS: 1
SHORTNESS OF BREATH: 0
DEPRESSION: 0
HEARTBURN: 0
CLAUDICATION: 0
DIARRHEA: 0
HEADACHES: 0
NAUSEA: 0
COUGH: 0
CONSTIPATION: 0
NERVOUS/ANXIOUS: 0
BLURRED VISION: 0
CHILLS: 0
INSOMNIA: 0
SPEECH CHANGE: 0
PHOTOPHOBIA: 0
SENSORY CHANGE: 0
SORE THROAT: 0
FOCAL WEAKNESS: 0

## 2019-03-16 NOTE — PROGRESS NOTES
"[facility] Medicine Daily Progress Note    Date of Service  3/16/2019    Chief Complaint  68 y.o. female admitted 3/8/2019 with right hip pain, fever and abnormal brain mri.    Hospital Course    Patient started on empiric antibiotics given fever.  She had abnormal findings on MRI brain and CT showed \"lesion\" on right iliopsoas.  This lesion was biopsied and turned out to be abscess.  She has history of breast cancer and there is also concern of brain lesions being metastatic though their appearance is not typical of this.      Interval Problem Update  3/12 Discussed with Dr Turner for second opinion of brain lesions and based on appearance not able to r/o or r/i any diagnosis.  Given patient's presentation of confusion, hip pain and fever  - this is more supportive of infectious etiology rather than malignant.  Fluid from right gluteal area sent to micro and as of yet - no growth.  Continue zosyn for now.  3/13 Patient with slight improvement in mentation.  Blood cultures and abscess cultures are showing no growth at this time.  TTE being done while I examined patient - no evidence of vegetations on this test.  ID consultation pending - d/w Dr Garcia.  3/14 Patient feeling well today, her pain is present but not as bad as prior to admission.  She was able to follow the conversation today and is agreeable to move forward with the CHAS tomorrow.  I spoke with her brother, René, and updated him on condition yesterday afternoon also.  Will also have PICC placed in next few days as long as blood cultures remain negative as I expect a prolonged antibiotic course of treatment.  3/15 Patient without new complaints, states she needs help to move in bed.  CHAS showed no evidence of vegetations and regular diet resumed.  Culture remains negative and biopsy of brain lesion may prove needed - will watch through the weekend and if mentation does not continue to improve, will discuss with neurosurgery on Monday.  3/16 Patient states " she is okay today, mentation has waxed and waned without significant improvement.  She was febrile earlier today.  No other acute events.    Consultants/Specialty  none    Code Status  full    Disposition  tbd    Review of Systems  Review of Systems   Constitutional: Negative for chills and fever.   HENT: Negative for congestion and sore throat.    Eyes: Negative for blurred vision and photophobia.   Respiratory: Negative for cough and shortness of breath.    Cardiovascular: Negative for chest pain, claudication and leg swelling.   Gastrointestinal: Negative for abdominal pain, constipation, diarrhea, heartburn, nausea and vomiting.   Genitourinary: Negative for dysuria and hematuria.   Musculoskeletal: Positive for myalgias (hip pain). Negative for joint pain (left hip pain).   Skin: Negative for itching and rash.   Neurological: Negative for dizziness, sensory change, speech change, weakness and headaches.   Psychiatric/Behavioral: Negative for depression. The patient is not nervous/anxious and does not have insomnia.         Physical Exam  Temp:  [36.1 °C (97 °F)-38.1 °C (100.5 °F)] 36.7 °C (98.1 °F)  Pulse:  [] 77  Resp:  [15-24] 15  BP: (112-182)/(53-93) 122/61  SpO2:  [91 %-97 %] 93 %    Physical Exam   Constitutional: She is oriented to person, place, and time. She appears well-developed and well-nourished. No distress.   HENT:   Head: Normocephalic and atraumatic.   Eyes: Conjunctivae are normal. No scleral icterus.   Neck: Neck supple. No JVD present.   Cardiovascular: Normal rate, regular rhythm and normal heart sounds.  Exam reveals no gallop and no friction rub.    No murmur heard.  Pulmonary/Chest: Effort normal and breath sounds normal. No respiratory distress. She exhibits no tenderness.   Abdominal: Soft. Bowel sounds are normal. She exhibits no distension. There is no guarding.   Musculoskeletal: She exhibits no edema or tenderness.   Lymphadenopathy:     She has no cervical adenopathy.    Neurological: She is alert and oriented to person, place, and time. No cranial nerve deficit.   Skin: Skin is warm and dry. She is not diaphoretic. No erythema. No pallor.   Psychiatric: She has a normal mood and affect. Her behavior is normal.   Nursing note and vitals reviewed.      Fluids    Intake/Output Summary (Last 24 hours) at 03/16/19 1410  Last data filed at 03/16/19 0752   Gross per 24 hour   Intake              480 ml   Output              300 ml   Net              180 ml       Laboratory  Recent Labs      03/14/19   0022  03/16/19   0850   WBC  5.8  7.9   RBC  4.68  5.05   HEMOGLOBIN  11.6*  12.5   HEMATOCRIT  37.5  40.6   MCV  80.1*  80.4*   MCH  24.8*  24.8*   MCHC  30.9*  30.8*   RDW  52.3*  51.9*   PLATELETCT  255  260   MPV  9.9  9.7     Recent Labs      03/14/19   0022  03/16/19   0009  03/16/19   0850   SODIUM  130*  131*  134*   POTASSIUM  3.5*  3.5*  3.1*   CHLORIDE  93*  93*  95*   CO2  29  27  27   GLUCOSE  102*  119*  197*   BUN  12  13  20   CREATININE  0.60  0.73  0.92   CALCIUM  9.5  10.2  10.2                   Imaging  EC-CHAS W/O CONT   Final Result      EC-CHAS W/O CONT   Final Result      CT-NEEDLE BX-MUSCLE RIGHT   Final Result      CT-guided aspiration of pus like material in the right gluteal muscle lesion.      EC-ECHOCARDIOGRAM COMPLETE W/O CONT   Final Result      OUTSIDE IMAGES-CT CHEST/ABDOMEN/PELVIS   Final Result      OUTSIDE IMAGES-DX CHEST   Final Result      AN-[id number]-GJRIFSL FILM X-RAY   Final Result      OUTSIDE IMAGES-MR BRAIN   Final Result      OUTSIDE IMAGES-MR BRAIN   Final Result           Assessment/Plan  * Lesion of brain   Assessment & Plan    Metastatic cancer vs infectious etiology  D/w Dr Black for questionable brain mets, recommending IR Bx of psoas muscle   IR Bx of psoas muscle ordered - was abscess  Empiric treatment of infection  TTE and CHAS negative for vegetations.     Encephalopathy   Assessment & Plan    Likely toxic metabolic from  infection  Improved       Mass of iliopsoas muscle group   Assessment & Plan    Consulted Dr Black for brain mets, recommending IR Bx of psoas muscle   IR Bx of psoas muscle completed, fluid drained - abscess, no growth  Continue abx, culture without growth, ID consult.         Anemia   Assessment & Plan    Continue to monitor, transfuse for hemoglobin less than 7     RLS (restless legs syndrome)- (present on admission)   Assessment & Plan    Pramipexole       Diabetes mellitus type 2 in obese (HCC)- (present on admission)   Assessment & Plan    Glycated hemoglobin 6.3, 3 months ago  Well controlled   Short acting insulin as needed   Accu-Checks, hypoglycemia protocol     Consider stopping accu-checks after Bx if still good control      History of breast cancer- (present on admission)   Assessment & Plan    Mets vs infection to brain  No known active breast cancer.       COPD (chronic obstructive pulmonary disease) ([facility])- (present on admission)   Assessment & Plan    Oxygen as needed, Respiratory protocol, Bronchodilators, Incentive spirometry      History of deep vein thrombosis- (present on admission)   Assessment & Plan    History of deep vein thrombosis   Was on Rivaroxaban as outpatient.     Held for Bx Mar 11, continue to hold if IC biopsy proves necessary  Lovenox daily for DVT prophylaxis while full AC on hold.         Essential hypertension- (present on admission)   Assessment & Plan    Restarted on losartan and furosemide with hold parameters.     GERD (gastroesophageal reflux disease)- (present on admission)   Assessment & Plan    Omeprazole     Chronic pain- (present on admission)   Assessment & Plan     Multifactorial  Likely secondary to abscess, fracture, etc.            VTE prophylaxis: scds

## 2019-03-16 NOTE — CARE PLAN
Problem: Communication  Goal: The ability to communicate needs accurately and effectively will improve  Outcome: PROGRESSING AS EXPECTED  Discussed POC with pt, all questions answered     Problem: Safety  Goal: Will remain free from injury  Outcome: PROGRESSING AS EXPECTED  Bed alarm in place, bed in locked and lowest position.

## 2019-03-16 NOTE — DISCHARGE PLANNING
Received Choice form at 0996  Agency/Facility Name: [facility], [facility], [facility], and [facility] ([phone]).  Referral sent per Choice form @ 0562

## 2019-03-16 NOTE — PROGRESS NOTES
Pharmacy Kinetics 68 y.o. female on vancomycin day # 4 3/16/2019    Currently on Vancomycin 1000 mg iv q12hr (0000, 1200)     Indication for Treatment: CNS infection concern and iliopsoas abscess     Pertinent history per medical record: Admitted on 3/8/2019 for AMS, SOB and hip pain. Pt has history of breast cancer, was found to have metastatic brain lesions on MRI at outside facility. Per ID note - primary concern is for metastatic disease, but do agree with pursuing an infectious workup as the lesions are not amenable to biopsy; wait for final culture results from iliopsoas lesion with further recommendations per culture results and clinical course. Lesion was biopsied 3/14 and turned out to be abscess.     Other antibiotics:   Cefepime 2g iv q12h    Allergies: Patient has no known allergies.     List concerns for renal function: BUN:SCr >20:1; age; elevated alk phos; NSAID use    Pertinent cultures to date:   3/11/19 - wound (R gluteal muscle) x 2: Negative  3/12/19 - blood (peripheral) x 2: NGTD    MRSA nares swab if pneumonia is a concern (ordered/positive/negative/n-a): N/A    Recent Labs      19   0022  19   0850   WBC  5.8  7.9   NEUTSPOLYS  68.50  76.80*     Recent Labs      19   0022  19   0009  19   0850   BUN  12  13  20   CREATININE  0.60  0.73  0.92   ALBUMIN  3.3  3.8  3.5     Recent Labs      19   1049   VANCOTROUGH  20.4*     Intake/Output Summary (Last 24 hours) at 19 0950  Last data filed at 19 0752   Gross per 24 hour   Intake             1020 ml   Output              300 ml   Net              720 ml      Blood pressure 122/61, pulse 77, temperature 36.7 °C (98.1 °F), temperature source Temporal, resp. rate 15, height 1.524 m (5'), weight 62.9 kg (138 lb 10.7 oz), SpO2 93 %, not currently breastfeeding. Temp (24hrs), Av.1 °C (98.8 °F), Min:36.1 °C (97 °F), Max:38.1 °C (100.5 °F)    A/P   1. Vancomycin dose change: HOLD vancomycin due to  supratherapeutic trough  2. Next vancomycin level: today @ 1130  3. Goal trough: 18-22 mcg/mL  4. A/P: Tmax 100.5 without leukocytosis. Lactic acid WNL. Worsening respiratory status, now on 2L sating in the 90s. Rapid response called overnight for tachycardia/hypertension. Vitals currently stable. Renal indices increased from 0.6-->0.9 mg/dL over the past 2 days. UOP appears to have dropped off from 3/12-->3/13; remains on furosemide 40 mg po BID. Last vanco trough therapeutic at 20.4 mcg/mL on 3/14; repeat trough today supratherapeutic at 33.7 mcg/mL. Instructed RN to stop vanco infusion. Will HOLD vancomycin and check a repeat level in ~20 hours. Repeat BMP with AM labs. If renal indices continue to worsen, recommend less nephrotoxic agent. Per ID, anticipate 10-14 day course. CHAS/TTE negative.       Esther Taylor, PharmD, BCOP

## 2019-03-16 NOTE — PROGRESS NOTES
Infectious Disease Progress Note    Author: Karen Garcia M.D. Date & Time of service: 3/16/2019  12:40 PM    Chief Complaint:  Multiple brain lesions  Iliopsoas lesion    Interval History:  68-year-old female with history of diabetes and breast cancer, admitted 3/8/2019 with abdominal pain, iliopsoas lesion, and an abnormal MRI with multiple brain lesions.  3/14 AF (99.2) WBC 5.8 more awake today as compared to yesterday but not oriented-denies pain, following commands. ECHO reviewed  3/15 AF (99.1) no WBC today Had CHAS today  3/16 temp 100.5 WBC 7.9 more confused today-naked but pushing off sheets  Labs Reviewed, Medications Reviewed     Review of Systems:  Review of Systems   Unable to perform ROS: Mental status change   Constitutional: Negative for fever.   Skin: Negative for rash.   Neurological: Negative for focal weakness.       Hemodynamics:  Temp (24hrs), Av.2 °C (98.9 °F), Min:36.1 °C (97 °F), Max:38.1 °C (100.5 °F)  Temperature: 36.7 °C (98.1 °F)  Pulse  Av.7  Min: 70  Max: 153  Blood Pressure : 122/61       Physical Exam:  Physical Exam   Constitutional: She appears well-developed.   HENT:   Mouth/Throat: No oropharyngeal exudate.   Good dentition   Eyes: Conjunctivae are normal. No scleral icterus.   Neck: Neck supple. No JVD present.   Cardiovascular: Intact distal pulses.    Murmur heard.  tachycardic   Pulmonary/Chest: Effort normal. No stridor. No respiratory distress.   Abdominal: Soft. Bowel sounds are normal. She exhibits no distension. There is no tenderness.   Musculoskeletal: She exhibits no edema, tenderness or deformity.   Lymphadenopathy:     She has no cervical adenopathy.   Neurological:   obtunded   Skin: Skin is warm. No rash noted. She is not diaphoretic.   Nursing note and vitals reviewed.      Meds:    Current Facility-Administered Medications:   •  labetalol  •  ibuprofen  •  LR  •  haloperidol lactate  •  MD Alert...Vancomycin per Pharmacy  •  cefepime  •   enoxaparin (LOVENOX) injection  •  vancomycin  •  pramipexole  •  potassium chloride SA  •  furosemide  •  tramadol  •  losartan  •  gabapentin  •  omeprazole  •  polyethylene glycol/lytes  •  senna-docusate **AND** polyethylene glycol/lytes **AND** magnesium hydroxide **AND** bisacodyl  •  Respiratory Care per Protocol  •  Respiratory Care per Protocol  •  acetaminophen  •  Notify provider if pain remains uncontrolled **AND** Use the numeric rating scale (NRS-11) on regular floors and Critical-Care Pain Observation Tool (CPOT) on ICUs/Trauma to assess pain **AND** Pulse Ox (Oximetry) **AND** Pharmacy Consult Request **AND** If patient difficult to arouse and/or has respiratory depression, stop any opiates that are currently infusing and call a Rapid Response. **AND** oxyCODONE immediate-release **AND** oxyCODONE immediate-release **AND** morphine injection  •  ondansetron  •  ondansetron    Labs:  Recent Labs      03/14/19   0022  03/16/19   0850   WBC  5.8  7.9   RBC  4.68  5.05   HEMOGLOBIN  11.6*  12.5   HEMATOCRIT  37.5  40.6   MCV  80.1*  80.4*   MCH  24.8*  24.8*   RDW  52.3*  51.9*   PLATELETCT  255  260   MPV  9.9  9.7   NEUTSPOLYS  68.50  76.80*   LYMPHOCYTES  19.00*  10.00*   MONOCYTES  10.20  11.00   EOSINOPHILS  1.70  0.80   BASOPHILS  0.30  0.90     Recent Labs      03/14/19   0022  03/16/19   0009  03/16/19   0850   SODIUM  130*  131*  134*   POTASSIUM  3.5*  3.5*  3.1*   CHLORIDE  93*  93*  95*   CO2  29  27  27   GLUCOSE  102*  119*  197*   BUN  12  13  20     Recent Labs      03/14/19   0022  03/16/19   0009  03/16/19   0850   ALBUMIN  3.3  3.8  3.5   TBILIRUBIN  0.6  0.7  0.7   ALKPHOSPHAT  427*  467*  381*   TOTPROTEIN  7.5  8.3*  8.4*   ALTSGPT  18  18  16   ASTSGOT  36  38  36   CREATININE  0.60  0.73  0.92       Imaging:  Ct-needle Bx-muscle Right    Result Date: 3/13/2019  3/11/2019 4:44 PM HISTORY/REASON FOR EXAM:  Right gluteal hypodense lesion. Moderate sedation was administered with  continuous monitoring of the patient under the direct supervision of  Medications: 2 mg of Versed and 200 mcg of fentanyl Total sedation time 60 minutes Procedure: After the informed consent the patient was placed on the CT table on prone position. Localization CT scan was obtained. It demonstrates hypodense area in the right gluteus muscle. Subsequently a 17-gauge needle was advanced into the lesion. 20  mL of pus was aspirated. The materials were sent to the pathology. The patient tolerated the procedure without any immediate competition.     CT-guided aspiration of pus like material in the right gluteal muscle lesion.    Ec-echocardiogram Complete W/o Cont    Result Date: 3/13/2019  Transthoracic Echo Report Echocardiography Laboratory CONCLUSIONS No prior study is available for comparison. Normal left ventricular systolic function. Left ventricular ejection fraction is visually estimated to be 65%. Normal diastolic function. Normal inferior vena cava size and inspiratory collapse. Aortic sclerosis without stenosis. Estimated right ventricular systolic pressure  is 33 mmHg. No obvious valvular vegetations are noted on a decent quality study.  If further valvular assessment is clinically indicated, consider transesophageal echocardiogram. SAM,  LV EF:  65    % FINDINGS Left Ventricle Normal left ventricular size and systolic function. Normal left ventricular wall thickness. Left ventricular ejection fraction is visually estimated to be 65%. Normal diastolic function. Right Ventricle Normal right ventricular size and systolic function. Right Atrium Normal right atrial size. Normal inferior vena cava size and inspiratory collapse. Left Atrium Normal left atrial size. Mitral Valve Structurally normal mitral valve without significant stenosis or regurgitation. Aortic Valve Aortic sclerosis without stenosis. No aortic insufficiency. Tricuspid Valve Structurally normal tricuspid valve. Mild tricuspid  "regurgitation. Right atrial pressure is estimated to be 3 mmHg. Estimated right ventricular systolic pressure  is 33 mmHg. Pulmonic Valve The pulmonic valve is not well visualized. No pulmonic insufficiency. Pericardium Normal pericardium without effusion. Aorta The aortic root is normal.  Ascending aorta diameter is 3.0 cm. Claire Tripathi MD (Electronically Signed) Final Date:     13 March 2019                 14:38      Micro:  Results     Procedure Component Value Units Date/Time    CULTURE WOUND W/ GRAM STAIN [[id number]] Collected:  03/11/19 1800    Order Status:  Completed Specimen:  Wound Updated:  03/15/19 0743     Significant Indicator NEG     Source WND     Site Right Gluteal Muscles     Culture Result Wound No growth at 4 days     Gram Stain Result Moderate WBCs.  No organisms seen.      BLOOD CULTURE [[id number]] Collected:  03/12/19 1826    Order Status:  Completed Specimen:  Blood from Peripheral Updated:  03/13/19 0748     Significant Indicator NEG     Source BLD     Site PERIPHERAL     Blood Culture No Growth    Note: Blood cultures are incubated for 5 days and  are monitored continuously.Positive blood cultures  are called to the RN and reported as soon as  they are identified.      Narrative:       Per Hospital Policy: Only change Specimen Src: to \"Line\" if  specified by physician order.    BLOOD CULTURE [[id number]] Collected:  03/12/19 1826    Order Status:  Completed Specimen:  Blood from Peripheral Updated:  03/13/19 0748     Significant Indicator NEG     Source BLD     Site PERIPHERAL     Blood Culture No Growth    Note: Blood cultures are incubated for 5 days and  are monitored continuously.Positive blood cultures  are called to the RN and reported as soon as  they are identified.      Narrative:       Per Hospital Policy: Only change Specimen Src: to \"Line\" if  specified by physician order.    URINALYSIS [[id number]]  (Abnormal) Collected:  03/13/19 0450    Order Status:  Completed Specimen:  Urine " from Urine, Clean Catch Updated:  03/13/19 0503     Color Yellow     Character Clear     Specific Gravity 1.015     Ph 5.5     Glucose Negative mg/dL      Ketones Negative mg/dL      Protein Negative mg/dL      Bilirubin Negative     Urobilinogen, Urine 0.2     Nitrite Negative     Leukocyte Esterase Trace (A)     Occult Blood Negative     Micro Urine Req Microscopic    Narrative:       Collected By:[id number] CINDY VINES    URINALYSIS [[id number]] Collected:  03/13/19 0450    Order Status:  Canceled Specimen:  Urine from Urine, Clean Catch     GRAM STAIN [[id number]] Collected:  03/11/19 1800    Order Status:  Completed Specimen:  Wound Updated:  03/12/19 0620     Significant Indicator .     Source WND     Site Right Gluteal Muscles     Gram Stain Result Moderate WBCs.  No organisms seen.      CULTURE TISSUE W/ GRM STAIN [[id number]]     Order Status:  No result Specimen:  Tissue from Other Tissue           Assessment:  Active Hospital Problems    Diagnosis   • *Lesion of brain [G93.9]   • Mass of iliopsoas muscle group [M62.89]   • Encephalopathy [G93.40]   • Hyponatremia [E87.1]   • History of breast cancer [Z85.3]   • Diabetes mellitus type 2 in obese (HCC) [E11.69, E66.9]       Plan:  Multiple brain lesions, ongoing work up  Low grade fever  No current leukocytosis  Persistent AMS-not improved and worse today  MRI with scattered small lesions incl aaron  TTE neg; CHAS neg  Bcxs neg to date  Biopsy lesions if feasible-eval likely Mon    Iliopsoas mass, on treatment  CT + 2.6 cm lesion in the right iliopsoas region as well as a 2.8x2.1 cm lesion in the right gluteal musculature   S/p aspiration +WBCs-cultures negative to date  Continue vanco and cefepime   Monitor renal function closely while on vanco  Anticipate 10-14 day course    H/o breast cancer  Markedly elevated alk phos  Recommend continued diaz for mets    Diabetes  Keep BS under 150 to help control current infection  Current -197    I have performed a  physical exam and reviewed and updated ROS as of today.  In review of yesterday's note dated  3/15/2019, there are no changes except as documented above.      CIRA Keller

## 2019-03-16 NOTE — PROGRESS NOTES
A/ox3- pt is very anxious and tearful, bed alarm in place.   Pt has a 20g PIV in place wrapped in coband LR @50mL/hr.  Tramadol given PRN for pain.  Pt up with 1 person assist and FWW.   Rounding in place.

## 2019-03-16 NOTE — PROGRESS NOTES
Pt pulled PIV. This RN and charge RN attempted to place another IV unsuccessfully. ICU's called to place ultrasound IV.

## 2019-03-16 NOTE — PROGRESS NOTES
Pt's HR was sustaining in the 150s/160s, BPs were 160-180s/90s. Temperature was 100.5F. Pt has been intermittently confused through shift (baseline per report).   Called rapid response and MD - ordered 500mL bolus NS and labs, EKG.  K came back at 3.5 - replaced this am with PO.  Lactic acid came back at 1.7.  Orders for Labetalol PO Q8 PRN - given.   Tylenol and Ibuprofen given per MD order.  HR and BP decreased into normal range for pt.   Fever has decreased. Pt is more alert and resting comfortably.   Will continue to monitor.     /71   Pulse 72   Temp 36.8 °C (98.3 °F) (Temporal)   Resp 20   Ht 1.524 m (5')   Wt 62.9 kg (138 lb 10.7 oz)   SpO2 94%   Breastfeeding? No   BMI 27.08 kg/m²

## 2019-03-17 ENCOUNTER — APPOINTMENT (OUTPATIENT)
Dept: RADIOLOGY | Facility: MEDICAL CENTER | Age: 69
DRG: 981 | End: 2019-03-17
Attending: INTERNAL MEDICINE
Payer: MEDICARE

## 2019-03-17 LAB
ALBUMIN SERPL BCP-MCNC: 3.5 G/DL (ref 3.2–4.9)
ALBUMIN/GLOB SERPL: 0.7 G/DL
ALP SERPL-CCNC: 323 U/L (ref 30–99)
ALT SERPL-CCNC: 17 U/L (ref 2–50)
ANION GAP SERPL CALC-SCNC: 12 MMOL/L (ref 0–11.9)
ANION GAP SERPL CALC-SCNC: 9 MMOL/L (ref 0–11.9)
AST SERPL-CCNC: 45 U/L (ref 12–45)
BACTERIA BLD CULT: NORMAL
BACTERIA BLD CULT: NORMAL
BASOPHILS # BLD AUTO: 0.5 % (ref 0–1.8)
BASOPHILS # BLD: 0.04 K/UL (ref 0–0.12)
BILIRUB SERPL-MCNC: 0.6 MG/DL (ref 0.1–1.5)
BUN SERPL-MCNC: 23 MG/DL (ref 8–22)
BUN SERPL-MCNC: 26 MG/DL (ref 8–22)
CALCIUM SERPL-MCNC: 10.2 MG/DL (ref 8.5–10.5)
CALCIUM SERPL-MCNC: 10.4 MG/DL (ref 8.5–10.5)
CHLORIDE SERPL-SCNC: 98 MMOL/L (ref 96–112)
CHLORIDE SERPL-SCNC: 99 MMOL/L (ref 96–112)
CK SERPL-CCNC: 47 U/L (ref 0–154)
CO2 SERPL-SCNC: 26 MMOL/L (ref 20–33)
CO2 SERPL-SCNC: 26 MMOL/L (ref 20–33)
CREAT SERPL-MCNC: 0.89 MG/DL (ref 0.5–1.4)
CREAT SERPL-MCNC: 0.99 MG/DL (ref 0.5–1.4)
EOSINOPHIL # BLD AUTO: 0.07 K/UL (ref 0–0.51)
EOSINOPHIL NFR BLD: 0.9 % (ref 0–6.9)
ERYTHROCYTE [DISTWIDTH] IN BLOOD BY AUTOMATED COUNT: 52.5 FL (ref 35.9–50)
GLOBULIN SER CALC-MCNC: 4.7 G/DL (ref 1.9–3.5)
GLUCOSE SERPL-MCNC: 124 MG/DL (ref 65–99)
GLUCOSE SERPL-MCNC: 147 MG/DL (ref 65–99)
HCT VFR BLD AUTO: 38.2 % (ref 37–47)
HGB BLD-MCNC: 12 G/DL (ref 12–16)
IMM GRANULOCYTES # BLD AUTO: 0.03 K/UL (ref 0–0.11)
IMM GRANULOCYTES NFR BLD AUTO: 0.4 % (ref 0–0.9)
LYMPHOCYTES # BLD AUTO: 0.67 K/UL (ref 1–4.8)
LYMPHOCYTES NFR BLD: 8.7 % (ref 22–41)
MCH RBC QN AUTO: 25.3 PG (ref 27–33)
MCHC RBC AUTO-ENTMCNC: 31.4 G/DL (ref 33.6–35)
MCV RBC AUTO: 80.4 FL (ref 81.4–97.8)
MONOCYTES # BLD AUTO: 0.75 K/UL (ref 0–0.85)
MONOCYTES NFR BLD AUTO: 9.7 % (ref 0–13.4)
NEUTROPHILS # BLD AUTO: 6.14 K/UL (ref 2–7.15)
NEUTROPHILS NFR BLD: 79.8 % (ref 44–72)
NRBC # BLD AUTO: 0 K/UL
NRBC BLD-RTO: 0 /100 WBC
PLATELET # BLD AUTO: 276 K/UL (ref 164–446)
PMV BLD AUTO: 10.6 FL (ref 9–12.9)
POTASSIUM SERPL-SCNC: 3.8 MMOL/L (ref 3.6–5.5)
POTASSIUM SERPL-SCNC: 3.9 MMOL/L (ref 3.6–5.5)
PROT SERPL-MCNC: 8.2 G/DL (ref 6–8.2)
RBC # BLD AUTO: 4.75 M/UL (ref 4.2–5.4)
SIGNIFICANT IND 70042: NORMAL
SIGNIFICANT IND 70042: NORMAL
SITE SITE: NORMAL
SITE SITE: NORMAL
SODIUM SERPL-SCNC: 133 MMOL/L (ref 135–145)
SODIUM SERPL-SCNC: 137 MMOL/L (ref 135–145)
SOURCE SOURCE: NORMAL
SOURCE SOURCE: NORMAL
VANCOMYCIN SERPL-MCNC: 25.1 UG/ML
WBC # BLD AUTO: 7.7 K/UL (ref 4.8–10.8)

## 2019-03-17 PROCEDURE — 36415 COLL VENOUS BLD VENIPUNCTURE: CPT

## 2019-03-17 PROCEDURE — 70460 CT HEAD/BRAIN W/DYE: CPT

## 2019-03-17 PROCEDURE — 700111 HCHG RX REV CODE 636 W/ 250 OVERRIDE (IP): Performed by: INTERNAL MEDICINE

## 2019-03-17 PROCEDURE — A9270 NON-COVERED ITEM OR SERVICE: HCPCS | Performed by: INTERNAL MEDICINE

## 2019-03-17 PROCEDURE — 80202 ASSAY OF VANCOMYCIN: CPT

## 2019-03-17 PROCEDURE — 99233 SBSQ HOSP IP/OBS HIGH 50: CPT | Performed by: INTERNAL MEDICINE

## 2019-03-17 PROCEDURE — 80048 BASIC METABOLIC PNL TOTAL CA: CPT

## 2019-03-17 PROCEDURE — 82550 ASSAY OF CK (CPK): CPT

## 2019-03-17 PROCEDURE — 770004 HCHG ROOM/CARE - ONCOLOGY PRIVATE *

## 2019-03-17 PROCEDURE — 700117 HCHG RX CONTRAST REV CODE 255: Performed by: INTERNAL MEDICINE

## 2019-03-17 PROCEDURE — A9270 NON-COVERED ITEM OR SERVICE: HCPCS | Performed by: HOSPITALIST

## 2019-03-17 PROCEDURE — 700102 HCHG RX REV CODE 250 W/ 637 OVERRIDE(OP): Performed by: HOSPITALIST

## 2019-03-17 PROCEDURE — 99232 SBSQ HOSP IP/OBS MODERATE 35: CPT | Performed by: INTERNAL MEDICINE

## 2019-03-17 PROCEDURE — 85025 COMPLETE CBC W/AUTO DIFF WBC: CPT

## 2019-03-17 PROCEDURE — 700102 HCHG RX REV CODE 250 W/ 637 OVERRIDE(OP): Performed by: INTERNAL MEDICINE

## 2019-03-17 PROCEDURE — 700105 HCHG RX REV CODE 258: Performed by: INTERNAL MEDICINE

## 2019-03-17 PROCEDURE — 80053 COMPREHEN METABOLIC PANEL: CPT

## 2019-03-17 RX ADMIN — ACETAMINOPHEN 650 MG: 325 TABLET, FILM COATED ORAL at 15:49

## 2019-03-17 RX ADMIN — CEFEPIME 2 G: 2 INJECTION, POWDER, FOR SOLUTION INTRAVENOUS at 05:31

## 2019-03-17 RX ADMIN — CEFEPIME 2 G: 2 INJECTION, POWDER, FOR SOLUTION INTRAVENOUS at 21:35

## 2019-03-17 RX ADMIN — VANCOMYCIN HYDROCHLORIDE 1000 MG: 100 INJECTION, POWDER, LYOPHILIZED, FOR SOLUTION INTRAVENOUS at 18:35

## 2019-03-17 RX ADMIN — POTASSIUM CHLORIDE 40 MEQ: 1500 TABLET, EXTENDED RELEASE ORAL at 05:27

## 2019-03-17 RX ADMIN — GABAPENTIN 300 MG: 300 CAPSULE ORAL at 18:40

## 2019-03-17 RX ADMIN — OMEPRAZOLE 20 MG: 20 CAPSULE, DELAYED RELEASE ORAL at 05:28

## 2019-03-17 RX ADMIN — LOSARTAN POTASSIUM 50 MG: 50 TABLET ORAL at 05:28

## 2019-03-17 RX ADMIN — ENOXAPARIN SODIUM 40 MG: 100 INJECTION SUBCUTANEOUS at 05:28

## 2019-03-17 RX ADMIN — IOHEXOL 100 ML: 350 INJECTION, SOLUTION INTRAVENOUS at 13:46

## 2019-03-17 RX ADMIN — FUROSEMIDE 40 MG: 40 TABLET ORAL at 18:40

## 2019-03-17 RX ADMIN — GABAPENTIN 300 MG: 300 CAPSULE ORAL at 05:28

## 2019-03-17 RX ADMIN — PRAMIPEXOLE DIHYDROCHLORIDE 0.25 MG: 0.25 TABLET ORAL at 15:49

## 2019-03-17 RX ADMIN — FUROSEMIDE 40 MG: 40 TABLET ORAL at 05:28

## 2019-03-17 RX ADMIN — POTASSIUM CHLORIDE 40 MEQ: 1500 TABLET, EXTENDED RELEASE ORAL at 18:40

## 2019-03-17 ASSESSMENT — ENCOUNTER SYMPTOMS
HEARTBURN: 0
SENSORY CHANGE: 0
VOMITING: 0
ABDOMINAL PAIN: 0
CONSTIPATION: 0
INSOMNIA: 0
FEVER: 0
WEAKNESS: 0
CHILLS: 0
FOCAL WEAKNESS: 0
NERVOUS/ANXIOUS: 0
COUGH: 0
SPEECH CHANGE: 0
HEADACHES: 0
SORE THROAT: 0
CLAUDICATION: 0
BLURRED VISION: 0
DEPRESSION: 0
DIARRHEA: 0
NAUSEA: 0
DIZZINESS: 0
MYALGIAS: 1
SHORTNESS OF BREATH: 0
PHOTOPHOBIA: 0

## 2019-03-17 NOTE — PROGRESS NOTES
A/ox3-disoriented to time, pt is very anxious, bed alarm in place.   Pt on 2L O2 -  in place.   Pt has a 20g PIV in place wrapped in coband running TKO/ABX.  Tramadol given PRN for pain.  Pt up with 1 person assist and FWW.   Last BM 3/13 - Bowel protocol in place.   Rounding in place.

## 2019-03-17 NOTE — PROGRESS NOTES
Explained to pt that physician has ordered CT of head with contrast. Pt agreeable. Will do 2 RN signing of consent as pt is confused at times.

## 2019-03-17 NOTE — PROGRESS NOTES
"Received report from [facility], assumed care of pt 0700.  Tremors noted to left arm, and pt is asking \"why is my hand doing that?\"   Pt is weak. Pt denies pain, denies nausea. PT was incontinent of stool.  PIV to right ac patent, flushed, IV has blood return, dressing with old blood, intact.  Pt falls asleep while being spoken to at times. She is not oriented to time or situation.   Bruising to arms, skin intact. Will turn pt and we have heels floating on pillows at this time. PT on waffle mattress.     "

## 2019-03-17 NOTE — CARE PLAN
Problem: Safety  Goal: Will remain free from injury  Outcome: PROGRESSING AS EXPECTED  Bed alarm on, A&O x3, confused at times, room by nursing station, slipper socks, bed locked and in low position, call light and personal belongings with reach, report given to CNA, appropriate signs outside door, hourly rounding in place.     Problem: Infection  Goal: Will remain free from infection  Outcome: PROGRESSING AS EXPECTED  Pt afebrile. IV abx.     Problem: Pain Management  Goal: Pain level will decrease to patient's comfort goal  Outcome: PROGRESSING AS EXPECTED  Pt rates pain 2-3/10 declines pain med.

## 2019-03-17 NOTE — PROGRESS NOTES
Pharmacy Kinetics 68 y.o. female on vancomycin day # 5  3/17/2019    Currently on Vancomycin pulse dosing    Indication for Treatment: CNS infection concern and iliopsoas abscess     Pertinent history per medical record: Admitted on 3/8/2019 for AMS, SOB and hip pain. Pt has history of breast cancer, was found to have metastatic brain lesions on MRI at outside facility. Per ID note - primary concern is for metastatic disease, but do agree with pursuing an infectious workup as the lesions are not amenable to biopsy; wait for final culture results from iliopsoas lesion with further recommendations per culture results and clinical course. Lesion was biopsied 3/14 and turned out to be abscess.     Other antibiotics:   Cefepime 2g iv q12h     Allergies: Patient has no known allergies.      List concerns for renal function: BUN:SCr >20:1; age; elevated alk phos; NSAID use     Pertinent cultures to date:   3/11/19 - wound (R gluteal muscle): NG x 4 days  3/12/19 - blood (peripheral) x 2: NG     MRSA nares swab if pneumonia is a concern (ordered/positive/negative/n-a): N/A      Recent Labs      19   0850  19   1221   WBC  7.9  7.7   NEUTSPOLYS  76.80*  79.80*     Recent Labs      19   0009  19   0850  19   0025  19   1221   BUN  13  20  23*  26*   CREATININE  0.73  0.92  0.99  0.89   ALBUMIN  3.8  3.5   --   3.5     Recent Labs      19   1138  19   0759   VANCOTROUGH  33.7*   --    VANCORANDOM   --   25.1   No intake or output data in the 24 hours ending 19 1413   Blood pressure 118/63, pulse 93, temperature 36.4 °C (97.5 °F), temperature source Temporal, resp. rate 18, height 1.524 m (5'), weight 62.9 kg (138 lb 10.7 oz), SpO2 99 %, not currently breastfeeding. Temp (24hrs), Av.6 °C (97.8 °F), Min:36.3 °C (97.4 °F), Max:37 °C (98.6 °F)    A/P   1. Vancomycin dose change: Vancomycin 1000 mg = 16 mg/kg single dose this afternoon  2. Next vancomycin level: to be  determined  3. Goal trough: 18 -22 mcg/ml  4. Comments: Vancomycin level down to 25 this morning. Slow clearance after accumulation on Q12h dosing. Rising BUN noted. Talked W/ ID today. Continue Vanco today for possible CNS and muscle infections. Reevaluate tomorrow.     Librado Mcdonald, BariD

## 2019-03-17 NOTE — PROGRESS NOTES
Infectious Disease Progress Note    Author: Karen Garcia M.D. Date & Time of service: 3/17/2019  11:44 AM    Chief Complaint:  Multiple brain lesions  Iliopsoas lesion    Interval History:  68-year-old female with history of diabetes and breast cancer, admitted 3/8/2019 with abdominal pain, iliopsoas lesion, and an abnormal MRI with multiple brain lesions.  3/14 AF (99.2) WBC 5.8 more awake today as compared to yesterday but not oriented-denies pain, following commands. ECHO reviewed  3/15 AF (99.1) no WBC today Had CHAS today  3/16 temp 100.5 WBC 7.9 more confused today-naked but pushing off sheets  3/17 AF WBC not done Less obtunded today-not oriented. Denies pain, N/V, HA, visual changes etc  Labs Reviewed, Medications Reviewed     Review of Systems:  Review of Systems   Unable to perform ROS: Mental status change   Constitutional: Negative for fever.   Gastrointestinal: Negative for abdominal pain, nausea and vomiting.   Skin: Negative for rash.   Neurological: Negative for focal weakness and headaches.       Hemodynamics:  Temp (24hrs), Av.6 °C (97.9 °F), Min:36.3 °C (97.4 °F), Max:37 °C (98.6 °F)  Temperature: 36.4 °C (97.5 °F)  Pulse  Av.2  Min: 70  Max: 153  Blood Pressure : 118/63       Physical Exam:  Physical Exam   Constitutional: She appears well-developed.   HENT:   Mouth/Throat: No oropharyngeal exudate.   Good dentition   Eyes: Conjunctivae are normal. Right eye exhibits no discharge. Left eye exhibits no discharge. No scleral icterus.   Neck: Neck supple. No JVD present.   Cardiovascular: Intact distal pulses.    Murmur heard.  tachycardic   Pulmonary/Chest: Effort normal. No stridor. No respiratory distress.   Well-healed mastectomy scar   Abdominal: Soft. Bowel sounds are normal. She exhibits no distension. There is no tenderness.   Musculoskeletal: She exhibits no edema, tenderness or deformity.   Lymphadenopathy:     She has no cervical adenopathy.   Neurological:   obtunded    Skin: Skin is warm. No rash noted. She is not diaphoretic.   Nursing note and vitals reviewed.      Meds:    Current Facility-Administered Medications:   •  labetalol  •  ibuprofen  •  potassium chloride SA  •  haloperidol lactate  •  MD Alert...Vancomycin per Pharmacy  •  cefepime  •  enoxaparin (LOVENOX) injection  •  pramipexole  •  furosemide  •  tramadol  •  losartan  •  gabapentin  •  omeprazole  •  polyethylene glycol/lytes  •  senna-docusate **AND** polyethylene glycol/lytes **AND** magnesium hydroxide **AND** bisacodyl  •  Respiratory Care per Protocol  •  Respiratory Care per Protocol  •  acetaminophen  •  Notify provider if pain remains uncontrolled **AND** Use the numeric rating scale (NRS-11) on regular floors and Critical-Care Pain Observation Tool (CPOT) on ICUs/Trauma to assess pain **AND** Pulse Ox (Oximetry) **AND** Pharmacy Consult Request **AND** If patient difficult to arouse and/or has respiratory depression, stop any opiates that are currently infusing and call a Rapid Response. **AND** oxyCODONE immediate-release **AND** oxyCODONE immediate-release **AND** morphine injection  •  ondansetron  •  ondansetron    Labs:  Recent Labs      03/16/19   0850   WBC  7.9   RBC  5.05   HEMOGLOBIN  12.5   HEMATOCRIT  40.6   MCV  80.4*   MCH  24.8*   RDW  51.9*   PLATELETCT  260   MPV  9.7   NEUTSPOLYS  76.80*   LYMPHOCYTES  10.00*   MONOCYTES  11.00   EOSINOPHILS  0.80   BASOPHILS  0.90     Recent Labs      03/16/19   0009  03/16/19   0850  03/17/19   0025   SODIUM  131*  134*  137   POTASSIUM  3.5*  3.1*  3.8   CHLORIDE  93*  95*  99   CO2  27  27  26   GLUCOSE  119*  197*  124*   BUN  13  20  23*     Recent Labs      03/16/19   0009  03/16/19   0850  03/17/19   0025   ALBUMIN  3.8  3.5   --    TBILIRUBIN  0.7  0.7   --    ALKPHOSPHAT  467*  381*   --    TOTPROTEIN  8.3*  8.4*   --    ALTSGPT  18  16   --    ASTSGOT  38  36   --    CREATININE  0.73  0.92  0.99       Imaging:  Ct-needle Bx-muscle  Right    Result Date: 3/13/2019  3/11/2019 4:44 PM HISTORY/REASON FOR EXAM:  Right gluteal hypodense lesion. Moderate sedation was administered with continuous monitoring of the patient under the direct supervision of  Medications: 2 mg of Versed and 200 mcg of fentanyl Total sedation time 60 minutes Procedure: After the informed consent the patient was placed on the CT table on prone position. Localization CT scan was obtained. It demonstrates hypodense area in the right gluteus muscle. Subsequently a 17-gauge needle was advanced into the lesion. 20  mL of pus was aspirated. The materials were sent to the pathology. The patient tolerated the procedure without any immediate competition.     CT-guided aspiration of pus like material in the right gluteal muscle lesion.    Ec-echocardiogram Complete W/o Cont    Result Date: 3/13/2019  Transthoracic Echo Report Echocardiography Laboratory CONCLUSIONS No prior study is available for comparison. Normal left ventricular systolic function. Left ventricular ejection fraction is visually estimated to be 65%. Normal diastolic function. Normal inferior vena cava size and inspiratory collapse. Aortic sclerosis without stenosis. Estimated right ventricular systolic pressure  is 33 mmHg. No obvious valvular vegetations are noted on a decent quality study.  If further valvular assessment is clinically indicated, consider transesophageal echocardiogram. SAM,  LV EF:  65    % FINDINGS Left Ventricle Normal left ventricular size and systolic function. Normal left ventricular wall thickness. Left ventricular ejection fraction is visually estimated to be 65%. Normal diastolic function. Right Ventricle Normal right ventricular size and systolic function. Right Atrium Normal right atrial size. Normal inferior vena cava size and inspiratory collapse. Left Atrium Normal left atrial size. Mitral Valve Structurally normal mitral valve without significant stenosis or  "regurgitation. Aortic Valve Aortic sclerosis without stenosis. No aortic insufficiency. Tricuspid Valve Structurally normal tricuspid valve. Mild tricuspid regurgitation. Right atrial pressure is estimated to be 3 mmHg. Estimated right ventricular systolic pressure  is 33 mmHg. Pulmonic Valve The pulmonic valve is not well visualized. No pulmonic insufficiency. Pericardium Normal pericardium without effusion. Aorta The aortic root is normal.  Ascending aorta diameter is 3.0 cm. Claire Tripathi MD (Electronically Signed) Final Date:     13 March 2019                 14:38      Micro:  Results     Procedure Component Value Units Date/Time    CULTURE WOUND W/ GRAM STAIN [[id number]] Collected:  03/11/19 1800    Order Status:  Completed Specimen:  Wound Updated:  03/15/19 0743     Significant Indicator NEG     Source WND     Site Right Gluteal Muscles     Culture Result Wound No growth at 4 days     Gram Stain Result Moderate WBCs.  No organisms seen.      BLOOD CULTURE [[id number]] Collected:  03/12/19 1826    Order Status:  Completed Specimen:  Blood from Peripheral Updated:  03/13/19 0748     Significant Indicator NEG     Source BLD     Site PERIPHERAL     Blood Culture No Growth    Note: Blood cultures are incubated for 5 days and  are monitored continuously.Positive blood cultures  are called to the RN and reported as soon as  they are identified.      Narrative:       Per Hospital Policy: Only change Specimen Src: to \"Line\" if  specified by physician order.    BLOOD CULTURE [[id number]] Collected:  03/12/19 1826    Order Status:  Completed Specimen:  Blood from Peripheral Updated:  03/13/19 0748     Significant Indicator NEG     Source BLD     Site PERIPHERAL     Blood Culture No Growth    Note: Blood cultures are incubated for 5 days and  are monitored continuously.Positive blood cultures  are called to the RN and reported as soon as  they are identified.      Narrative:       Per Hospital Policy: Only change Specimen " "Src: to \"Line\" if  specified by physician order.    URINALYSIS [[id number]]  (Abnormal) Collected:  03/13/19 0450    Order Status:  Completed Specimen:  Urine from Urine, Clean Catch Updated:  03/13/19 0503     Color Yellow     Character Clear     Specific Gravity 1.015     Ph 5.5     Glucose Negative mg/dL      Ketones Negative mg/dL      Protein Negative mg/dL      Bilirubin Negative     Urobilinogen, Urine 0.2     Nitrite Negative     Leukocyte Esterase Trace (A)     Occult Blood Negative     Micro Urine Req Microscopic    Narrative:       Collected By:[id number] CINDY VINES    URINALYSIS [[id number]] Collected:  03/13/19 0450    Order Status:  Canceled Specimen:  Urine from Urine, Clean Catch     GRAM STAIN [[id number]] Collected:  03/11/19 1800    Order Status:  Completed Specimen:  Wound Updated:  03/12/19 0620     Significant Indicator .     Source WND     Site Right Gluteal Muscles     Gram Stain Result Moderate WBCs.  No organisms seen.            Assessment:  Active Hospital Problems    Diagnosis   • *Lesion of brain [G93.9]   • Mass of iliopsoas muscle group [M62.89]   • Encephalopathy [G93.40]   • Hyponatremia [E87.1]   • History of breast cancer [Z85.3]   • Diabetes mellitus type 2 in obese (HCC) [E11.69, E66.9]       Plan:  Multiple brain lesions, ongoing work up  Low grade fever  No current leukocytosis  Persistent AMS-waxing and waning  MRI with scattered small lesions incl aaron  TTE neg; CHAS neg  Bcxs neg to date  Biopsy lesions if feasible-eval likely Mon  CT planned for today    Iliopsoas mass, on treatment  CT + 2.6 cm lesion in the right iliopsoas region as well as a 2.8x2.1 cm lesion in the right gluteal musculature   S/p aspiration +WBCs-cultures negative to date  Bcxs remain neg  Continue vanco and cefepime   Monitor renal function closely while on vanco  Anticipate 10-14 day course    H/o breast cancer  Markedly elevated alk phos  Recommend continued diaz for mets    Diabetes  Keep BS under 150 to " help control current infection  Current -197    I have performed a physical exam and reviewed and updated ROS as of today.  In review of yesterday's note dated  3/16/2019, there are no changes except as documented above.    CIRA Keller        ADDENDUM:  Called by pharmacy  Vanco not clearing well  Change vanco to dapto 8 mg/KG

## 2019-03-17 NOTE — PROGRESS NOTES
"[facility] Medicine Daily Progress Note    Date of Service  3/17/2019    Chief Complaint  68 y.o. female admitted 3/8/2019 with right hip pain, fever and abnormal brain mri.    Hospital Course    Patient started on empiric antibiotics given fever.  She had abnormal findings on MRI brain and CT showed \"lesion\" on right iliopsoas.  This lesion was biopsied and turned out to be abscess.  She has history of breast cancer and there is also concern of brain lesions being metastatic though their appearance is not typical of this.      Interval Problem Update  3/12 Discussed with Dr Turner for second opinion of brain lesions and based on appearance not able to r/o or r/i any diagnosis.  Given patient's presentation of confusion, hip pain and fever  - this is more supportive of infectious etiology rather than malignant.  Fluid from right gluteal area sent to micro and as of yet - no growth.  Continue zosyn for now.  3/13 Patient with slight improvement in mentation.  Blood cultures and abscess cultures are showing no growth at this time.  TTE being done while I examined patient - no evidence of vegetations on this test.  ID consultation pending - d/w Dr Garcia.  3/14 Patient feeling well today, her pain is present but not as bad as prior to admission.  She was able to follow the conversation today and is agreeable to move forward with the CHAS tomorrow.  I spoke with her brother, René, and updated him on condition yesterday afternoon also.  Will also have PICC placed in next few days as long as blood cultures remain negative as I expect a prolonged antibiotic course of treatment.  3/15 Patient without new complaints, states she needs help to move in bed.  CHAS showed no evidence of vegetations and regular diet resumed.  Culture remains negative and biopsy of brain lesion may prove needed - will watch through the weekend and if mentation does not continue to improve, will discuss with neurosurgery on Monday.  3/16 Patient states " she is okay today, mentation has waxed and waned without significant improvement.  She was febrile earlier today.  No other acute events.  3/17 Patient with significant improvement in mentation today, she is aware of her hospitalization, not falling asleep mid sentence.  Her pain mediations were held most of yesterday and likely far too strong for patient and were affecting her awareness.  Oxycodone 5 discontinued and will use tramadol instead unless pain not well controlled.  CT brain with contrast ordered as a quick scan for comparison to MRI.      Consultants/Specialty  none    Code Status  full    Disposition  SNF    Review of Systems  Review of Systems   Constitutional: Negative for chills and fever.   HENT: Negative for congestion and sore throat.    Eyes: Negative for blurred vision and photophobia.   Respiratory: Negative for cough and shortness of breath.    Cardiovascular: Negative for chest pain, claudication and leg swelling.   Gastrointestinal: Negative for abdominal pain, constipation, diarrhea, heartburn, nausea and vomiting.   Genitourinary: Negative for dysuria and hematuria.   Musculoskeletal: Positive for myalgias (hip pain). Negative for joint pain (left hip pain).   Skin: Negative for itching and rash.   Neurological: Negative for dizziness, sensory change, speech change, weakness and headaches.   Psychiatric/Behavioral: Negative for depression. The patient is not nervous/anxious and does not have insomnia.         Physical Exam  Temp:  [36.3 °C (97.4 °F)-37 °C (98.6 °F)] 36.4 °C (97.5 °F)  Pulse:  [] 93  Resp:  [16-20] 18  BP: (115-134)/(63-97) 118/63  SpO2:  [92 %-99 %] 99 %    Physical Exam   Constitutional: She is oriented to person, place, and time. She appears well-developed and well-nourished. No distress.   HENT:   Head: Normocephalic and atraumatic.   Eyes: Conjunctivae are normal. No scleral icterus.   Neck: Neck supple. No JVD present.   Cardiovascular: Normal rate, regular  rhythm and normal heart sounds.  Exam reveals no gallop and no friction rub.    No murmur heard.  Pulmonary/Chest: Effort normal and breath sounds normal. No respiratory distress. She exhibits no tenderness.   Abdominal: Soft. Bowel sounds are normal. She exhibits no distension. There is no guarding.   Musculoskeletal: She exhibits no edema or tenderness.   Lymphadenopathy:     She has no cervical adenopathy.   Neurological: She is alert and oriented to person, place, and time. No cranial nerve deficit.   Skin: Skin is warm and dry. She is not diaphoretic. No erythema. No pallor.   Psychiatric: She has a normal mood and affect. Her behavior is normal.   Nursing note and vitals reviewed.      Fluids    Intake/Output Summary (Last 24 hours) at 03/17/19 1444  Last data filed at 03/17/19 1400   Gross per 24 hour   Intake                0 ml   Output              225 ml   Net             -225 ml       Laboratory  Recent Labs      03/16/19   0850  03/17/19   1221   WBC  7.9  7.7   RBC  5.05  4.75   HEMOGLOBIN  12.5  12.0   HEMATOCRIT  40.6  38.2   MCV  80.4*  80.4*   MCH  24.8*  25.3*   MCHC  30.8*  31.4*   RDW  51.9*  52.5*   PLATELETCT  260  276   MPV  9.7  10.6     Recent Labs      03/16/19   0850  03/17/19   0025  03/17/19   1221   SODIUM  134*  137  133*   POTASSIUM  3.1*  3.8  3.9   CHLORIDE  95*  99  98   CO2  27  26  26   GLUCOSE  197*  124*  147*   BUN  20  23*  26*   CREATININE  0.92  0.99  0.89   CALCIUM  10.2  10.4  10.2                   Imaging  CT-HEAD WITH   Final Result      Multiple subcentimeter too numerous to count enhancing masses scattered throughout the supratentorial and infratentorial brain. These demonstrate some surrounding vasogenic edema and most likely represent multifocal metastatic lesions. Other    considerations would include multifocal abscesses or septic emboli.      EC-CHAS W/O CONT   Final Result      EC-CHAS W/O CONT   Final Result      CT-NEEDLE BX-MUSCLE RIGHT   Final Result       CT-guided aspiration of pus like material in the right gluteal muscle lesion.      EC-ECHOCARDIOGRAM COMPLETE W/O CONT   Final Result      OUTSIDE IMAGES-CT CHEST/ABDOMEN/PELVIS   Final Result      OUTSIDE IMAGES-DX CHEST   Final Result      [id number] FILM X-RAY   Final Result      OUTSIDE IMAGES-MR BRAIN   Final Result      OUTSIDE IMAGES-MR BRAIN   Final Result           Assessment/Plan  * Lesion of brain   Assessment & Plan    Metastatic cancer vs infectious etiology  D/w Dr Black for questionable brain mets, recommending IR Bx of psoas muscle   IR Bx of psoas muscle ordered - was abscess  Empiric treatment of infection  TTE and CHAS negative for vegetations.  Repeat CT head with contrast for evaluation as mentation waxes and wanes.       Encephalopathy   Assessment & Plan    Likely toxic metabolic from infection  Improved       Mass of iliopsoas muscle group   Assessment & Plan    Consulted Dr Black for brain mets, recommending IR Bx of psoas muscle   IR Bx of psoas muscle completed, fluid drained - abscess, no growth  Continue abx, culture without growth, ID consult.         Anemia   Assessment & Plan    Continue to monitor, transfuse for hemoglobin less than 7     RLS (restless legs syndrome)- (present on admission)   Assessment & Plan    Pramipexole       Diabetes mellitus type 2 in obese (HCC)- (present on admission)   Assessment & Plan    Glycated hemoglobin 6.3, 3 months ago  Well controlled   Short acting insulin as needed   Accu-Checks, hypoglycemia protocol     Consider stopping accu-checks after Bx if still good control      History of breast cancer- (present on admission)   Assessment & Plan    Mets vs infection to brain  No known active breast cancer.       COPD (chronic obstructive pulmonary disease) (HCC)- (present on admission)   Assessment & Plan    Oxygen as needed, Respiratory protocol, Bronchodilators, Incentive spirometry      History of deep vein thrombosis- (present on admission)    Assessment & Plan    History of deep vein thrombosis   Was on Rivaroxaban as outpatient.     Held for Bx Mar 11, continue to hold if IC biopsy proves necessary  Lovenox daily for DVT prophylaxis while full AC on hold.         Essential hypertension- (present on admission)   Assessment & Plan    Restarted on losartan and furosemide with hold parameters.     GERD (gastroesophageal reflux disease)- (present on admission)   Assessment & Plan    Omeprazole     Chronic pain- (present on admission)   Assessment & Plan     Multifactorial  Likely secondary to abscess, fracture, etc.            VTE prophylaxis: scds

## 2019-03-18 LAB
ALBUMIN SERPL BCP-MCNC: 3.4 G/DL (ref 3.2–4.9)
ALBUMIN/GLOB SERPL: 0.7 G/DL
ALP SERPL-CCNC: 339 U/L (ref 30–99)
ALT SERPL-CCNC: 18 U/L (ref 2–50)
ANION GAP SERPL CALC-SCNC: 9 MMOL/L (ref 0–11.9)
AST SERPL-CCNC: 39 U/L (ref 12–45)
BASOPHILS # BLD AUTO: 0.7 % (ref 0–1.8)
BASOPHILS # BLD: 0.04 K/UL (ref 0–0.12)
BILIRUB SERPL-MCNC: 0.5 MG/DL (ref 0.1–1.5)
BUN SERPL-MCNC: 25 MG/DL (ref 8–22)
CALCIUM SERPL-MCNC: 10.6 MG/DL (ref 8.5–10.5)
CHLORIDE SERPL-SCNC: 99 MMOL/L (ref 96–112)
CO2 SERPL-SCNC: 26 MMOL/L (ref 20–33)
CREAT SERPL-MCNC: 0.84 MG/DL (ref 0.5–1.4)
EOSINOPHIL # BLD AUTO: 0.18 K/UL (ref 0–0.51)
EOSINOPHIL NFR BLD: 3 % (ref 0–6.9)
ERYTHROCYTE [DISTWIDTH] IN BLOOD BY AUTOMATED COUNT: 51.9 FL (ref 35.9–50)
GLOBULIN SER CALC-MCNC: 4.8 G/DL (ref 1.9–3.5)
GLUCOSE SERPL-MCNC: 142 MG/DL (ref 65–99)
HCT VFR BLD AUTO: 39.2 % (ref 37–47)
HGB BLD-MCNC: 12.2 G/DL (ref 12–16)
IMM GRANULOCYTES # BLD AUTO: 0.03 K/UL (ref 0–0.11)
IMM GRANULOCYTES NFR BLD AUTO: 0.5 % (ref 0–0.9)
LYMPHOCYTES # BLD AUTO: 0.85 K/UL (ref 1–4.8)
LYMPHOCYTES NFR BLD: 14.1 % (ref 22–41)
MCH RBC QN AUTO: 24.9 PG (ref 27–33)
MCHC RBC AUTO-ENTMCNC: 31.1 G/DL (ref 33.6–35)
MCV RBC AUTO: 80 FL (ref 81.4–97.8)
MONOCYTES # BLD AUTO: 0.67 K/UL (ref 0–0.85)
MONOCYTES NFR BLD AUTO: 11.1 % (ref 0–13.4)
NEUTROPHILS # BLD AUTO: 4.24 K/UL (ref 2–7.15)
NEUTROPHILS NFR BLD: 70.6 % (ref 44–72)
NRBC # BLD AUTO: 0 K/UL
NRBC BLD-RTO: 0 /100 WBC
PLATELET # BLD AUTO: 248 K/UL (ref 164–446)
PMV BLD AUTO: 10.1 FL (ref 9–12.9)
POTASSIUM SERPL-SCNC: 4 MMOL/L (ref 3.6–5.5)
PROT SERPL-MCNC: 8.2 G/DL (ref 6–8.2)
RBC # BLD AUTO: 4.9 M/UL (ref 4.2–5.4)
SODIUM SERPL-SCNC: 134 MMOL/L (ref 135–145)
WBC # BLD AUTO: 6 K/UL (ref 4.8–10.8)

## 2019-03-18 PROCEDURE — 700111 HCHG RX REV CODE 636 W/ 250 OVERRIDE (IP): Performed by: INTERNAL MEDICINE

## 2019-03-18 PROCEDURE — 700102 HCHG RX REV CODE 250 W/ 637 OVERRIDE(OP): Performed by: INTERNAL MEDICINE

## 2019-03-18 PROCEDURE — 36415 COLL VENOUS BLD VENIPUNCTURE: CPT

## 2019-03-18 PROCEDURE — 770004 HCHG ROOM/CARE - ONCOLOGY PRIVATE *

## 2019-03-18 PROCEDURE — 99232 SBSQ HOSP IP/OBS MODERATE 35: CPT | Performed by: INTERNAL MEDICINE

## 2019-03-18 PROCEDURE — 700102 HCHG RX REV CODE 250 W/ 637 OVERRIDE(OP): Performed by: HOSPITALIST

## 2019-03-18 PROCEDURE — A9270 NON-COVERED ITEM OR SERVICE: HCPCS | Performed by: HOSPITALIST

## 2019-03-18 PROCEDURE — 80053 COMPREHEN METABOLIC PANEL: CPT

## 2019-03-18 PROCEDURE — A9270 NON-COVERED ITEM OR SERVICE: HCPCS | Performed by: INTERNAL MEDICINE

## 2019-03-18 PROCEDURE — 85025 COMPLETE CBC W/AUTO DIFF WBC: CPT

## 2019-03-18 PROCEDURE — 99233 SBSQ HOSP IP/OBS HIGH 50: CPT | Performed by: INTERNAL MEDICINE

## 2019-03-18 PROCEDURE — 700105 HCHG RX REV CODE 258: Performed by: INTERNAL MEDICINE

## 2019-03-18 RX ORDER — LINEZOLID 600 MG/1
600 TABLET, FILM COATED ORAL EVERY 12 HOURS
Status: DISCONTINUED | OUTPATIENT
Start: 2019-03-18 | End: 2019-03-25

## 2019-03-18 RX ADMIN — ENOXAPARIN SODIUM 40 MG: 100 INJECTION SUBCUTANEOUS at 05:05

## 2019-03-18 RX ADMIN — POTASSIUM CHLORIDE 40 MEQ: 1500 TABLET, EXTENDED RELEASE ORAL at 05:05

## 2019-03-18 RX ADMIN — CEFEPIME 2 G: 2 INJECTION, POWDER, FOR SOLUTION INTRAVENOUS at 05:05

## 2019-03-18 RX ADMIN — FUROSEMIDE 40 MG: 40 TABLET ORAL at 17:28

## 2019-03-18 RX ADMIN — POTASSIUM CHLORIDE 40 MEQ: 1500 TABLET, EXTENDED RELEASE ORAL at 17:27

## 2019-03-18 RX ADMIN — LINEZOLID 600 MG: 600 TABLET, FILM COATED ORAL at 17:34

## 2019-03-18 RX ADMIN — GABAPENTIN 300 MG: 300 CAPSULE ORAL at 05:05

## 2019-03-18 RX ADMIN — TRAMADOL HYDROCHLORIDE 50 MG: 50 TABLET, COATED ORAL at 18:00

## 2019-03-18 RX ADMIN — LOSARTAN POTASSIUM 50 MG: 50 TABLET ORAL at 05:05

## 2019-03-18 RX ADMIN — GABAPENTIN 300 MG: 300 CAPSULE ORAL at 17:28

## 2019-03-18 RX ADMIN — LABETALOL HCL 100 MG: 200 TABLET, FILM COATED ORAL at 05:44

## 2019-03-18 RX ADMIN — FUROSEMIDE 40 MG: 40 TABLET ORAL at 05:06

## 2019-03-18 RX ADMIN — PRAMIPEXOLE DIHYDROCHLORIDE 0.25 MG: 0.25 TABLET ORAL at 11:32

## 2019-03-18 RX ADMIN — ACETAMINOPHEN 650 MG: 325 TABLET, FILM COATED ORAL at 22:30

## 2019-03-18 RX ADMIN — LINEZOLID 600 MG: 600 TABLET, FILM COATED ORAL at 11:30

## 2019-03-18 RX ADMIN — TRAMADOL HYDROCHLORIDE 50 MG: 50 TABLET, COATED ORAL at 01:03

## 2019-03-18 RX ADMIN — OMEPRAZOLE 20 MG: 20 CAPSULE, DELAYED RELEASE ORAL at 05:05

## 2019-03-18 RX ADMIN — CEFEPIME 2 G: 2 INJECTION, POWDER, FOR SOLUTION INTRAVENOUS at 17:27

## 2019-03-18 ASSESSMENT — ENCOUNTER SYMPTOMS
MYALGIAS: 1
FEVER: 0
CLAUDICATION: 0
VOMITING: 0
DEPRESSION: 0
COUGH: 1
NERVOUS/ANXIOUS: 0
PHOTOPHOBIA: 0
SPEECH CHANGE: 0
WEAKNESS: 0
COUGH: 0
DIARRHEA: 0
ABDOMINAL PAIN: 0
CHILLS: 0
FOCAL WEAKNESS: 0
SENSORY CHANGE: 0
HEARTBURN: 0
CONSTIPATION: 0
BLURRED VISION: 0
SORE THROAT: 0
HEADACHES: 0
SHORTNESS OF BREATH: 0
DIZZINESS: 0
INSOMNIA: 0
NAUSEA: 0

## 2019-03-18 NOTE — CARE PLAN
Problem: Communication  Goal: The ability to communicate needs accurately and effectively will improve  Outcome: PROGRESSING AS EXPECTED  Discussed POC with pt, all questions answered     Problem: Safety  Goal: Will remain free from injury  Outcome: PROGRESSING AS EXPECTED  Bed alarm in place, close to nursing station

## 2019-03-18 NOTE — PROGRESS NOTES
Infectious Disease Progress Note    Author: Ashely Hinojosa M.D. Date & Time of service: 3/18/2019  9:36 AM    Chief Complaint:  Multiple brain lesions  Iliopsoas lesion    Interval History:  68-year-old female with history of diabetes and breast cancer, admitted 3/8/2019 with abdominal pain, iliopsoas lesion, and an abnormal MRI with multiple brain lesions.  3/14 AF (99.2) WBC 5.8 more awake today as compared to yesterday but not oriented-denies pain, following commands. ECHO reviewed  3/15 AF (99.1) no WBC today Had CHAS today  3/16 temp 100.5 WBC 7.9 more confused today-naked but pushing off sheets  3/17 AF WBC not done Less obtunded today-not oriented. Denies pain, N/V, HA, visual changes etc  3/18 afebrile WBC 6 patient sitting up in bed eating breakfast, she is quite soft spoken.  She denies any headache, nausea or vomiting.  Endorses a mild cough.    Labs Reviewed    Review of Systems:  Review of Systems   Constitutional: Negative for chills and fever.   Respiratory: Positive for cough. Negative for shortness of breath.    Gastrointestinal: Negative for abdominal pain, diarrhea, nausea and vomiting.   Skin: Negative for rash.   Neurological: Negative for dizziness, focal weakness and headaches.   All other systems reviewed and are negative.      Hemodynamics:  Temp (24hrs), Av.6 °C (97.8 °F), Min:36.2 °C (97.2 °F), Max:36.8 °C (98.2 °F)  Temperature: 36.2 °C (97.2 °F)  Pulse  Av.6  Min: 70  Max: 153  Blood Pressure : 109/48       Physical Exam:  Physical Exam   Constitutional: She appears well-developed.   HENT:   Head: Normocephalic and atraumatic.   Mouth/Throat: Oropharynx is clear and moist. No oropharyngeal exudate.   Good dentition   Eyes: Pupils are equal, round, and reactive to light. Conjunctivae and EOM are normal. Right eye exhibits no discharge. Left eye exhibits no discharge. No scleral icterus.   Neck: Neck supple. No JVD present.   Cardiovascular: Intact distal pulses.    Murmur  heard.  tachycardic   Pulmonary/Chest: Effort normal. No stridor. No respiratory distress.   Well-healed mastectomy scar   Abdominal: Soft. Bowel sounds are normal. She exhibits no distension. There is no tenderness.   Musculoskeletal: She exhibits no edema, tenderness or deformity.   Lymphadenopathy:     She has no cervical adenopathy.   Neurological: She is alert.   Skin: Skin is warm. No rash noted. She is not diaphoretic.   Nursing note and vitals reviewed.      Meds:    Current Facility-Administered Medications:   •  labetalol  •  ibuprofen  •  potassium chloride SA  •  haloperidol lactate  •  MD Alert...Vancomycin per Pharmacy  •  cefepime  •  enoxaparin (LOVENOX) injection  •  pramipexole  •  furosemide  •  tramadol  •  losartan  •  gabapentin  •  omeprazole  •  polyethylene glycol/lytes  •  senna-docusate **AND** polyethylene glycol/lytes **AND** magnesium hydroxide **AND** bisacodyl  •  Respiratory Care per Protocol  •  Respiratory Care per Protocol  •  acetaminophen  •  Notify provider if pain remains uncontrolled **AND** Use the numeric rating scale (NRS-11) on regular floors and Critical-Care Pain Observation Tool (CPOT) on ICUs/Trauma to assess pain **AND** Pulse Ox (Oximetry) **AND** Pharmacy Consult Request **AND** If patient difficult to arouse and/or has respiratory depression, stop any opiates that are currently infusing and call a Rapid Response. **AND** oxyCODONE immediate-release **AND** [DISCONTINUED] oxyCODONE immediate-release **AND** morphine injection  •  ondansetron  •  ondansetron    Labs:  Recent Labs      03/16/19   0850  03/17/19   1221  03/18/19   0014   WBC  7.9  7.7  6.0   RBC  5.05  4.75  4.90   HEMOGLOBIN  12.5  12.0  12.2   HEMATOCRIT  40.6  38.2  39.2   MCV  80.4*  80.4*  80.0*   MCH  24.8*  25.3*  24.9*   RDW  51.9*  52.5*  51.9*   PLATELETCT  260  276  248   MPV  9.7  10.6  10.1   NEUTSPOLYS  76.80*  79.80*  70.60   LYMPHOCYTES  10.00*  8.70*  14.10*   MONOCYTES  11.00  9.70   11.10   EOSINOPHILS  0.80  0.90  3.00   BASOPHILS  0.90  0.50  0.70     Recent Labs      03/17/19   0025  03/17/19   1221  03/18/19   0014   SODIUM  137  133*  134*   POTASSIUM  3.8  3.9  4.0   CHLORIDE  99  98  99   CO2  26  26  26   GLUCOSE  124*  147*  142*   BUN  23*  26*  25*   CPKTOTAL   --   47   --      Recent Labs      03/16/19   0850  03/17/19   0025  03/17/19   1221  03/18/19   0014   ALBUMIN  3.5   --   3.5  3.4   TBILIRUBIN  0.7   --   0.6  0.5   ALKPHOSPHAT  381*   --   323*  339*   TOTPROTEIN  8.4*   --   8.2  8.2   ALTSGPT  16   --   17  18   ASTSGOT  36   --   45  39   CREATININE  0.92  0.99  0.89  0.84       Imaging:  Ct-needle Bx-muscle Right    Result Date: 3/13/2019  3/11/2019 4:44 PM HISTORY/REASON FOR EXAM:  Right gluteal hypodense lesion. Moderate sedation was administered with continuous monitoring of the patient under the direct supervision of  Medications: 2 mg of Versed and 200 mcg of fentanyl Total sedation time 60 minutes Procedure: After the informed consent the patient was placed on the CT table on prone position. Localization CT scan was obtained. It demonstrates hypodense area in the right gluteus muscle. Subsequently a 17-gauge needle was advanced into the lesion. 20  mL of pus was aspirated. The materials were sent to the pathology. The patient tolerated the procedure without any immediate competition.     CT-guided aspiration of pus like material in the right gluteal muscle lesion.    Ec-echocardiogram Complete W/o Cont    Result Date: 3/13/2019  Transthoracic Echo Report Echocardiography Laboratory CONCLUSIONS No prior study is available for comparison. Normal left ventricular systolic function. Left ventricular ejection fraction is visually estimated to be 65%. Normal diastolic function. Normal inferior vena cava size and inspiratory collapse. Aortic sclerosis without stenosis. Estimated right ventricular systolic pressure  is 33 mmHg. No obvious valvular  "vegetations are noted on a decent quality study.  If further valvular assessment is clinically indicated, consider transesophageal echocardiogram. SAM,  LV EF:  65    % FINDINGS Left Ventricle Normal left ventricular size and systolic function. Normal left ventricular wall thickness. Left ventricular ejection fraction is visually estimated to be 65%. Normal diastolic function. Right Ventricle Normal right ventricular size and systolic function. Right Atrium Normal right atrial size. Normal inferior vena cava size and inspiratory collapse. Left Atrium Normal left atrial size. Mitral Valve Structurally normal mitral valve without significant stenosis or regurgitation. Aortic Valve Aortic sclerosis without stenosis. No aortic insufficiency. Tricuspid Valve Structurally normal tricuspid valve. Mild tricuspid regurgitation. Right atrial pressure is estimated to be 3 mmHg. Estimated right ventricular systolic pressure  is 33 mmHg. Pulmonic Valve The pulmonic valve is not well visualized. No pulmonic insufficiency. Pericardium Normal pericardium without effusion. Aorta The aortic root is normal.  Ascending aorta diameter is 3.0 cm. Claire Tripathi MD (Electronically Signed) Final Date:     13 March 2019                 14:38      Micro:  Results     Procedure Component Value Units Date/Time    BLOOD CULTURE [[id number]] Collected:  03/12/19 1826    Order Status:  Completed Specimen:  Blood from Peripheral Updated:  03/17/19 2100     Significant Indicator NEG     Source BLD     Site PERIPHERAL     Blood Culture No growth after 5 days of incubation.    Narrative:       Per Hospital Policy: Only change Specimen Src: to \"Line\" if  specified by physician order.    BLOOD CULTURE [[id number]] Collected:  03/12/19 1826    Order Status:  Completed Specimen:  Blood from Peripheral Updated:  03/17/19 2100     Significant Indicator NEG     Source BLD     Site PERIPHERAL     Blood Culture No growth after 5 days of incubation.    " "Narrative:       Per Hospital Policy: Only change Specimen Src: to \"Line\" if  specified by physician order.    CULTURE WOUND W/ GRAM STAIN [[id number]] Collected:  03/11/19 1800    Order Status:  Completed Specimen:  Wound Updated:  03/15/19 0743     Significant Indicator NEG     Source WND     Site Right Gluteal Muscles     Culture Result Wound No growth at 4 days     Gram Stain Result Moderate WBCs.  No organisms seen.      URINALYSIS [[id number]]  (Abnormal) Collected:  03/13/19 0450    Order Status:  Completed Specimen:  Urine from Urine, Clean Catch Updated:  03/13/19 0503     Color Yellow     Character Clear     Specific Gravity 1.015     Ph 5.5     Glucose Negative mg/dL      Ketones Negative mg/dL      Protein Negative mg/dL      Bilirubin Negative     Urobilinogen, Urine 0.2     Nitrite Negative     Leukocyte Esterase Trace (A)     Occult Blood Negative     Micro Urine Req Microscopic    Narrative:       Collected By:[id number] CINDY VINES    URINALYSIS [[id number]] Collected:  03/13/19 0450    Order Status:  Canceled Specimen:  Urine from Urine, Clean Catch     GRAM STAIN [[id number]] Collected:  03/11/19 1800    Order Status:  Completed Specimen:  Wound Updated:  03/12/19 0620     Significant Indicator .     Source WND     Site Right Gluteal Muscles     Gram Stain Result Moderate WBCs.  No organisms seen.            Assessment:  Active Hospital Problems    Diagnosis   • *Lesion of brain [G93.9]   • Mass of iliopsoas muscle group [M62.89]   • Encephalopathy [G93.40]   • Hyponatremia [E87.1]   • History of breast cancer [Z85.3]   • Diabetes mellitus type 2 in obese (HCC) [E11.69, E66.9]       Plan:  Multiple brain lesions, ongoing work up  Low-grade fevers resolved  No current leukocytosis  Persistent AMS-waxing and waning  MRI with scattered small lesions incl aaron  TTE neg; CHAS neg  Bcxs neg to date  Biopsy lesions if feasible-eval likely Mon.   Dr. Nguyen of neurosurgery to evaluate patient today    Iliopsoas " mass, on treatment  CT + 2.6 cm lesion in the right iliopsoas region as well as a 2.8x2.1 cm lesion in the right gluteal musculature   S/p aspiration +WBCs-cultures negative to date  Bcxs remain neg  Discontinue Vanco secondary to elevated Vanco troughs and patient not clearing well  Change to linezolid for now  Continue cefepime  Anticipate 10-14 day course    H/o breast cancer  Markedly elevated alk phos  Recommend continued diaz for mets    Type 2 diabetes mellitus  Hemoglobin A1c 6.3  Keep BS under 150 to help control current infection    CIRA Keller and oncology pharmacist

## 2019-03-18 NOTE — PROGRESS NOTES
"[facility] Medicine Daily Progress Note    Date of Service  3/18/2019    Chief Complaint  68 y.o. female admitted 3/8/2019 with right hip pain, fever and abnormal brain mri.    Hospital Course    Patient started on empiric antibiotics given fever.  She had abnormal findings on MRI brain and CT showed \"lesion\" on right iliopsoas.  This lesion was biopsied and turned out to be abscess.  She has history of breast cancer and there is also concern of brain lesions being metastatic though their appearance is not typical of this.      Interval Problem Update  3/12 Discussed with Dr Turner for second opinion of brain lesions and based on appearance not able to r/o or r/i any diagnosis.  Given patient's presentation of confusion, hip pain and fever  - this is more supportive of infectious etiology rather than malignant.  Fluid from right gluteal area sent to micro and as of yet - no growth.  Continue zosyn for now.  3/13 Patient with slight improvement in mentation.  Blood cultures and abscess cultures are showing no growth at this time.  TTE being done while I examined patient - no evidence of vegetations on this test.  ID consultation pending - d/w Dr Garcia.  3/14 Patient feeling well today, her pain is present but not as bad as prior to admission.  She was able to follow the conversation today and is agreeable to move forward with the CHAS tomorrow.  I spoke with her brother, René, and updated him on condition yesterday afternoon also.  Will also have PICC placed in next few days as long as blood cultures remain negative as I expect a prolonged antibiotic course of treatment.  3/15 Patient without new complaints, states she needs help to move in bed.  CHAS showed no evidence of vegetations and regular diet resumed.  Culture remains negative and biopsy of brain lesion may prove needed - will watch through the weekend and if mentation does not continue to improve, will discuss with neurosurgery on Monday.  3/16 Patient states " she is okay today, mentation has waxed and waned without significant improvement.  She was febrile earlier today.  No other acute events.  3/17 Patient with significant improvement in mentation today, she is aware of her hospitalization, not falling asleep mid sentence.  Her pain mediations were held most of yesterday and likely far too strong for patient and were affecting her awareness.  Oxycodone 5 discontinued and will use tramadol instead unless pain not well controlled.  CT brain with contrast ordered as a quick scan for comparison to MRI.    3/18 Patient feeling same today, discussed need to discuss with neurosurgery for possible biopsy.  Discussed with Dr Nguyen, he reviewed MRI and CT brain and he feels they are likely metastatic lesions but are far too small to biopsy.  His recommendation is to continue with antibiotics and re-image in 2 weeks to see if any change that would be amenable to biopsy or that would further declare infection.    Consultants/Specialty  ID - de    Code Status  full    Disposition  SNF    Review of Systems  Review of Systems   Constitutional: Negative for chills and fever.   HENT: Negative for congestion and sore throat.    Eyes: Negative for blurred vision and photophobia.   Respiratory: Negative for cough and shortness of breath.    Cardiovascular: Negative for chest pain, claudication and leg swelling.   Gastrointestinal: Negative for abdominal pain, constipation, diarrhea, heartburn, nausea and vomiting.   Genitourinary: Negative for dysuria and hematuria.   Musculoskeletal: Positive for myalgias (hip pain). Negative for joint pain (left hip pain).   Skin: Negative for itching and rash.   Neurological: Negative for dizziness, sensory change, speech change, weakness and headaches.   Psychiatric/Behavioral: Negative for depression. The patient is not nervous/anxious and does not have insomnia.         Physical Exam  Temp:  [36.2 °C (97.2 °F)-36.8 °C (98.2 °F)] 36.4 °C (97.5  °F)  Pulse:  [] 95  Resp:  [18-28] 24  BP: (109-173)/() 130/67  SpO2:  [92 %-99 %] 95 %    Physical Exam   Constitutional: She is oriented to person, place, and time. She appears well-developed and well-nourished. No distress.   HENT:   Head: Normocephalic and atraumatic.   Eyes: Conjunctivae are normal. No scleral icterus.   Neck: Neck supple. No JVD present.   Cardiovascular: Normal rate, regular rhythm and normal heart sounds.  Exam reveals no gallop and no friction rub.    No murmur heard.  Pulmonary/Chest: Effort normal and breath sounds normal. No respiratory distress. She exhibits no tenderness.   Abdominal: Soft. Bowel sounds are normal. She exhibits no distension. There is no guarding.   Musculoskeletal: She exhibits no edema or tenderness.   Lymphadenopathy:     She has no cervical adenopathy.   Neurological: She is alert and oriented to person, place, and time. No cranial nerve deficit.   Skin: Skin is warm and dry. She is not diaphoretic. No erythema. No pallor.   Psychiatric: She has a normal mood and affect. Her behavior is normal.   Nursing note and vitals reviewed.      Fluids    Intake/Output Summary (Last 24 hours) at 03/18/19 1631  Last data filed at 03/18/19 1000   Gross per 24 hour   Intake              120 ml   Output                0 ml   Net              120 ml       Laboratory  Recent Labs      03/16/19   0850  03/17/19   1221  03/18/19   0014   WBC  7.9  7.7  6.0   RBC  5.05  4.75  4.90   HEMOGLOBIN  12.5  12.0  12.2   HEMATOCRIT  40.6  38.2  39.2   MCV  80.4*  80.4*  80.0*   MCH  24.8*  25.3*  24.9*   MCHC  30.8*  31.4*  31.1*   RDW  51.9*  52.5*  51.9*   PLATELETCT  260  276  248   MPV  9.7  10.6  10.1     Recent Labs      03/17/19   0025  03/17/19   1221  03/18/19   0014   SODIUM  137  133*  134*   POTASSIUM  3.8  3.9  4.0   CHLORIDE  99  98  99   CO2  26  26  26   GLUCOSE  124*  147*  142*   BUN  23*  26*  25*   CREATININE  0.99  0.89  0.84   CALCIUM  10.4  10.2  10.6*                    Imaging  CT-HEAD WITH   Final Result      Multiple subcentimeter too numerous to count enhancing masses scattered throughout the supratentorial and infratentorial brain. These demonstrate some surrounding vasogenic edema and most likely represent multifocal metastatic lesions. Other    considerations would include multifocal abscesses or septic emboli.      EC-CHAS W/O CONT   Final Result      EC-CHAS W/O CONT   Final Result      CT-NEEDLE BX-MUSCLE RIGHT   Final Result      CT-guided aspiration of pus like material in the right gluteal muscle lesion.      EC-ECHOCARDIOGRAM COMPLETE W/O CONT   Final Result      OUTSIDE IMAGES-CT CHEST/ABDOMEN/PELVIS   Final Result      OUTSIDE IMAGES-DX CHEST   Final Result      [id number] FILM X-RAY   Final Result      OUTSIDE IMAGES-MR BRAIN   Final Result      OUTSIDE IMAGES-MR BRAIN   Final Result           Assessment/Plan  * Lesion of brain   Assessment & Plan    Metastatic cancer vs infectious etiology  D/w Dr Black for questionable brain mets, recommending IR Bx of psoas muscle   IR Bx of psoas muscle ordered - was abscess  Empiric treatment of infection  TTE and CHAS negative for vegetations.  Repeat CT head with contrast for evaluation as mentation waxes and wanes. Lesions stable  D/w Dr Nguyen - lesions likely metastatic based on his interpretation of MRI/CT - too small to biopsy, recommends continuing antibiotics and re-imaging in 2 weeks to see if there has been any change.         Encephalopathy   Assessment & Plan    Likely toxic metabolic from infection  Improved       Mass of iliopsoas muscle group   Assessment & Plan    Consulted Dr Black for brain mets, recommending IR Bx of psoas muscle   IR Bx of psoas muscle completed, fluid drained - abscess, no growth  Continue abx, culture without growth, ID consult.         Anemia   Assessment & Plan    Continue to monitor, transfuse for hemoglobin less than 7     RLS (restless legs syndrome)- (present on  admission)   Assessment & Plan    Pramipexole       Diabetes mellitus type 2 in obese (HCC)- (present on admission)   Assessment & Plan    Glycated hemoglobin 6.3, 3 months ago  Well controlled   Short acting insulin as needed   Accu-Checks, hypoglycemia protocol          History of breast cancer- (present on admission)   Assessment & Plan    Mets vs infection to brain  No known active breast cancer.       COPD (chronic obstructive pulmonary disease) ([facility])- (present on admission)   Assessment & Plan    Oxygen as needed, Respiratory protocol, Bronchodilators, Incentive spirometry      History of deep vein thrombosis- (present on admission)   Assessment & Plan    History of deep vein thrombosis   Was on Rivaroxaban as outpatient.     Held for Bx Mar 11, continue to hold if IC biopsy proves necessary  Lovenox daily for DVT prophylaxis while full AC on hold.         Essential hypertension- (present on admission)   Assessment & Plan    Restarted on losartan and furosemide with hold parameters.     GERD (gastroesophageal reflux disease)- (present on admission)   Assessment & Plan    Omeprazole     Chronic pain- (present on admission)   Assessment & Plan     Multifactorial  Likely secondary to abscess, fracture, etc.            VTE prophylaxis: scds

## 2019-03-18 NOTE — CARE PLAN
Problem: Safety  Goal: Will remain free from injury    Intervention: Provide assistance with mobility  PT up with 2 assist to bsc.     Goal: Will remain free from falls    Intervention: Implement fall precautions  Fall precautions in place including treaded socks on and bed strip alarm on.       Problem: Skin Integrity  Goal: Risk for impaired skin integrity will decrease    Intervention: Assess and monitor skin integrity, appearance and/or temperature  Pt repositioned throughout shift. Skin remains intact.

## 2019-03-18 NOTE — PROGRESS NOTES
Received report from [facility], resumed care of pt 0700.  Pt with noted tremors again to left arm. Tachypneic with respirations 28. Pt denies pain. Again, asking why her arm is shaking.  Pt was incontinent of bladder. Cleaned and changed. Helped with breakfast.   Bed alarm on. Pt near nurses station, frequent rounding. PIV to right ac patent.

## 2019-03-18 NOTE — PROGRESS NOTES
A/ox3-disoriented to time, pt is confused and anxious intermittently, bed alarm in place.   Pt on 1L O2 -  in place.   Pt has a L 20g PIV in place wrapped in coband running TKO/ABX.  Tramadol given PRN for pain.  Pt up with 2 person assist and FWW.   Last BM 3/18 - Bowel protocol in place.   Rounding in place.

## 2019-03-18 NOTE — PROGRESS NOTES
A/ox3-disoriented to time, pt is confused and anxious intermittently, bed alarm in place.   Pt on 2L O2 -  in place.   Pt has a 20g PIV in place wrapped in coband running TKO/ABX.  Tramadol given PRN for pain.  Pt up with 2 person assist and FWW.   Last BM 3/17 - Bowel protocol in place.   Rounding in place.

## 2019-03-18 NOTE — DISCHARGE PLANNING
Agency/Facility Name: [facility]  Outcome: fax failed. Resent fax @ 1000.    Agency/Facility Name: [facility]  Outcome: no answer. Left message for admissions to call [facility] back.     Agency/Facility Name: [facility]  Spoke To: admissions  Outcome: referral pending.    Rufina (RNCM) notified.

## 2019-03-19 LAB
ALBUMIN SERPL BCP-MCNC: 3.2 G/DL (ref 3.2–4.9)
ALBUMIN/GLOB SERPL: 0.7 G/DL
ALP SERPL-CCNC: 319 U/L (ref 30–99)
ALT SERPL-CCNC: 20 U/L (ref 2–50)
ANION GAP SERPL CALC-SCNC: 8 MMOL/L (ref 0–11.9)
AST SERPL-CCNC: 34 U/L (ref 12–45)
BILIRUB SERPL-MCNC: 0.5 MG/DL (ref 0.1–1.5)
BUN SERPL-MCNC: 28 MG/DL (ref 8–22)
CALCIUM SERPL-MCNC: 11.3 MG/DL (ref 8.5–10.5)
CHLORIDE SERPL-SCNC: 98 MMOL/L (ref 96–112)
CO2 SERPL-SCNC: 28 MMOL/L (ref 20–33)
CREAT SERPL-MCNC: 1.07 MG/DL (ref 0.5–1.4)
GLOBULIN SER CALC-MCNC: 4.3 G/DL (ref 1.9–3.5)
GLUCOSE SERPL-MCNC: 123 MG/DL (ref 65–99)
POTASSIUM SERPL-SCNC: 4 MMOL/L (ref 3.6–5.5)
PROT SERPL-MCNC: 7.5 G/DL (ref 6–8.2)
SODIUM SERPL-SCNC: 134 MMOL/L (ref 135–145)

## 2019-03-19 PROCEDURE — A9270 NON-COVERED ITEM OR SERVICE: HCPCS | Performed by: HOSPITALIST

## 2019-03-19 PROCEDURE — 700105 HCHG RX REV CODE 258: Performed by: INTERNAL MEDICINE

## 2019-03-19 PROCEDURE — 700102 HCHG RX REV CODE 250 W/ 637 OVERRIDE(OP): Performed by: HOSPITALIST

## 2019-03-19 PROCEDURE — 97110 THERAPEUTIC EXERCISES: CPT

## 2019-03-19 PROCEDURE — 97530 THERAPEUTIC ACTIVITIES: CPT

## 2019-03-19 PROCEDURE — 770004 HCHG ROOM/CARE - ONCOLOGY PRIVATE *

## 2019-03-19 PROCEDURE — A9270 NON-COVERED ITEM OR SERVICE: HCPCS | Performed by: INTERNAL MEDICINE

## 2019-03-19 PROCEDURE — 97535 SELF CARE MNGMENT TRAINING: CPT

## 2019-03-19 PROCEDURE — 99232 SBSQ HOSP IP/OBS MODERATE 35: CPT | Performed by: INTERNAL MEDICINE

## 2019-03-19 PROCEDURE — 36415 COLL VENOUS BLD VENIPUNCTURE: CPT

## 2019-03-19 PROCEDURE — 700111 HCHG RX REV CODE 636 W/ 250 OVERRIDE (IP): Performed by: INTERNAL MEDICINE

## 2019-03-19 PROCEDURE — 80053 COMPREHEN METABOLIC PANEL: CPT

## 2019-03-19 PROCEDURE — 700102 HCHG RX REV CODE 250 W/ 637 OVERRIDE(OP): Performed by: INTERNAL MEDICINE

## 2019-03-19 PROCEDURE — 97112 NEUROMUSCULAR REEDUCATION: CPT

## 2019-03-19 RX ORDER — SODIUM CHLORIDE 9 MG/ML
INJECTION, SOLUTION INTRAVENOUS CONTINUOUS
Status: DISCONTINUED | OUTPATIENT
Start: 2019-03-19 | End: 2019-04-02

## 2019-03-19 RX ADMIN — PRAMIPEXOLE DIHYDROCHLORIDE 0.25 MG: 0.25 TABLET ORAL at 20:37

## 2019-03-19 RX ADMIN — OMEPRAZOLE 20 MG: 20 CAPSULE, DELAYED RELEASE ORAL at 04:52

## 2019-03-19 RX ADMIN — CEFEPIME 2 G: 2 INJECTION, POWDER, FOR SOLUTION INTRAVENOUS at 17:55

## 2019-03-19 RX ADMIN — GABAPENTIN 300 MG: 300 CAPSULE ORAL at 17:55

## 2019-03-19 RX ADMIN — SENNOSIDES AND DOCUSATE SODIUM 2 TABLET: 8.6; 5 TABLET ORAL at 04:52

## 2019-03-19 RX ADMIN — LINEZOLID 600 MG: 600 TABLET, FILM COATED ORAL at 17:56

## 2019-03-19 RX ADMIN — LINEZOLID 600 MG: 600 TABLET, FILM COATED ORAL at 04:58

## 2019-03-19 RX ADMIN — CEFEPIME 2 G: 2 INJECTION, POWDER, FOR SOLUTION INTRAVENOUS at 04:52

## 2019-03-19 RX ADMIN — SENNOSIDES AND DOCUSATE SODIUM 2 TABLET: 8.6; 5 TABLET ORAL at 17:54

## 2019-03-19 RX ADMIN — GABAPENTIN 300 MG: 300 CAPSULE ORAL at 04:52

## 2019-03-19 RX ADMIN — SODIUM CHLORIDE: 9 INJECTION, SOLUTION INTRAVENOUS at 19:08

## 2019-03-19 RX ADMIN — POTASSIUM CHLORIDE 40 MEQ: 1500 TABLET, EXTENDED RELEASE ORAL at 04:52

## 2019-03-19 RX ADMIN — FUROSEMIDE 40 MG: 40 TABLET ORAL at 04:52

## 2019-03-19 RX ADMIN — ENOXAPARIN SODIUM 40 MG: 100 INJECTION SUBCUTANEOUS at 04:52

## 2019-03-19 RX ADMIN — LOSARTAN POTASSIUM 50 MG: 50 TABLET ORAL at 04:52

## 2019-03-19 RX ADMIN — POTASSIUM CHLORIDE 40 MEQ: 1500 TABLET, EXTENDED RELEASE ORAL at 17:53

## 2019-03-19 ASSESSMENT — ENCOUNTER SYMPTOMS
VOMITING: 0
DIARRHEA: 0
SORE THROAT: 0
WEAKNESS: 0
BLURRED VISION: 0
NAUSEA: 0
COUGH: 1
ABDOMINAL PAIN: 0
CLAUDICATION: 0
PHOTOPHOBIA: 0
SHORTNESS OF BREATH: 0
CHILLS: 0
MYALGIAS: 1
HEARTBURN: 0
SENSORY CHANGE: 0
NERVOUS/ANXIOUS: 0
COUGH: 0
INSOMNIA: 0
HEADACHES: 0
DIZZINESS: 0
FOCAL WEAKNESS: 0
DEPRESSION: 0
FEVER: 0

## 2019-03-19 ASSESSMENT — COGNITIVE AND FUNCTIONAL STATUS - GENERAL
CLIMB 3 TO 5 STEPS WITH RAILING: TOTAL
MOBILITY SCORE: 9
WALKING IN HOSPITAL ROOM: A LOT
MOVING FROM LYING ON BACK TO SITTING ON SIDE OF FLAT BED: A LOT
STANDING UP FROM CHAIR USING ARMS: A LOT
MOVING TO AND FROM BED TO CHAIR: UNABLE
TURNING FROM BACK TO SIDE WHILE IN FLAT BAD: UNABLE
SUGGESTED CMS G CODE MODIFIER MOBILITY: CM

## 2019-03-19 ASSESSMENT — GAIT ASSESSMENTS: GAIT LEVEL OF ASSIST: UNABLE TO PARTICIPATE

## 2019-03-19 NOTE — PROGRESS NOTES
Pt appeared more lethargic today than yesterday. Again with noted tremors to both arms that are intermittent. L>R.

## 2019-03-19 NOTE — DISCHARGE PLANNING
Agency/Facility Name: [facility]  Outcome: no answer. Left message to call [facility] back with update on referral.     Rufina (RNCM) notified.

## 2019-03-19 NOTE — DISCHARGE PLANNING
Agency/Facility Name: [facility]  Spoke To: admitting physician  Outcome: referral pending. Physician concerned Pt may need more that 20 days at SNF and because there is no secondary insurance they want CM to make Pt aware of possible out of pocket costs. [facility] informed RNCM.     Rufina (RNCM) notified.

## 2019-03-19 NOTE — PROGRESS NOTES
Infectious Disease Progress Note    Author: Ashely Hinojosa M.D. Date & Time of service: 3/19/2019  9:26 AM    Chief Complaint:  Multiple brain lesions  Iliopsoas lesion    Interval History:  68-year-old female with history of diabetes and breast cancer, admitted 3/8/2019 with abdominal pain, iliopsoas lesion, and an abnormal MRI with multiple brain lesions.  3/14 AF (99.2) WBC 5.8 more awake today as compared to yesterday but not oriented-denies pain, following commands. ECHO reviewed  3/15 AF (99.1) no WBC today Had CHAS today  3/16 temp 100.5 WBC 7.9 more confused today-naked but pushing off sheets  3/17 AF WBC not done Less obtunded today-not oriented. Denies pain, N/V, HA, visual changes etc  3/18 afebrile WBC 6 patient sitting up in bed eating breakfast, she is quite soft spoken.  She denies any headache, nausea or vomiting.  Endorses a mild cough.  3/19 AF pt denies any new symptoms, no HA. Brain lesions too small for biopsy per notes. Neurosurgery recommends repeating scan in 2 weeks.    Labs Reviewed    Review of Systems:  Review of Systems   Constitutional: Negative for chills and fever.   Respiratory: Positive for cough. Negative for shortness of breath.         Stable   Gastrointestinal: Negative for abdominal pain, diarrhea, nausea and vomiting.   Skin: Negative for rash.   Neurological: Negative for dizziness, focal weakness and headaches.   All other systems reviewed and are negative.      Hemodynamics:  Temp (24hrs), Av.5 °C (97.7 °F), Min:36.3 °C (97.4 °F), Max:37.1 °C (98.8 °F)  Temperature: 36.3 °C (97.4 °F)  Pulse  Av.3  Min: 70  Max: 153  Blood Pressure : 127/78       Physical Exam:  Physical Exam   Constitutional: She is oriented to person, place, and time. She appears well-developed.   Elderly   HENT:   Head: Normocephalic and atraumatic.   Mouth/Throat: Oropharynx is clear and moist. No oropharyngeal exudate.   Good dentition   Eyes: Pupils are equal, round, and reactive to light.  Conjunctivae and EOM are normal. Right eye exhibits no discharge. Left eye exhibits no discharge. No scleral icterus.   Neck: Neck supple. No JVD present.   Cardiovascular: Intact distal pulses.    Murmur heard.  tachycardic   Pulmonary/Chest: Effort normal. No stridor. No respiratory distress.   Well-healed left breast mastectomy scar   Abdominal: Soft. Bowel sounds are normal. She exhibits no distension. There is no tenderness.   Musculoskeletal: She exhibits no edema, tenderness or deformity.   Lymphadenopathy:     She has no cervical adenopathy.   Neurological: She is alert and oriented to person, place, and time.   Skin: Skin is warm. No rash noted. She is not diaphoretic.   Nursing note and vitals reviewed.      Meds:    Current Facility-Administered Medications:   •  linezolid  •  labetalol  •  ibuprofen  •  potassium chloride SA  •  haloperidol lactate  •  cefepime  •  enoxaparin (LOVENOX) injection  •  pramipexole  •  furosemide  •  tramadol  •  losartan  •  gabapentin  •  omeprazole  •  polyethylene glycol/lytes  •  senna-docusate **AND** polyethylene glycol/lytes **AND** magnesium hydroxide **AND** bisacodyl  •  Respiratory Care per Protocol  •  Respiratory Care per Protocol  •  acetaminophen  •  Notify provider if pain remains uncontrolled **AND** Use the numeric rating scale (NRS-11) on regular floors and Critical-Care Pain Observation Tool (CPOT) on ICUs/Trauma to assess pain **AND** Pulse Ox (Oximetry) **AND** Pharmacy Consult Request **AND** If patient difficult to arouse and/or has respiratory depression, stop any opiates that are currently infusing and call a Rapid Response. **AND** oxyCODONE immediate-release **AND** [DISCONTINUED] oxyCODONE immediate-release **AND** morphine injection  •  ondansetron  •  ondansetron    Labs:  Recent Labs      03/17/19   1221  03/18/19   0014   WBC  7.7  6.0   RBC  4.75  4.90   HEMOGLOBIN  12.0  12.2   HEMATOCRIT  38.2  39.2   MCV  80.4*  80.0*   MCH  25.3*  24.9*    RDW  52.5*  51.9*   PLATELETCT  276  248   MPV  10.6  10.1   NEUTSPOLYS  79.80*  70.60   LYMPHOCYTES  8.70*  14.10*   MONOCYTES  9.70  11.10   EOSINOPHILS  0.90  3.00   BASOPHILS  0.50  0.70     Recent Labs      03/17/19   1221  03/18/19   0014  03/19/19   0024   SODIUM  133*  134*  134*   POTASSIUM  3.9  4.0  4.0   CHLORIDE  98  99  98   CO2  26  26  28   GLUCOSE  147*  142*  123*   BUN  26*  25*  28*   CPKTOTAL  47   --    --      Recent Labs      03/17/19   1221  03/18/19   0014  03/19/19   0024   ALBUMIN  3.5  3.4  3.2   TBILIRUBIN  0.6  0.5  0.5   ALKPHOSPHAT  323*  339*  319*   TOTPROTEIN  8.2  8.2  7.5   ALTSGPT  17  18  20   ASTSGOT  45  39  34   CREATININE  0.89  0.84  1.07       Imaging:  Ct-needle Bx-muscle Right    Result Date: 3/13/2019  3/11/2019 4:44 PM HISTORY/REASON FOR EXAM:  Right gluteal hypodense lesion. Moderate sedation was administered with continuous monitoring of the patient under the direct supervision of  Medications: 2 mg of Versed and 200 mcg of fentanyl Total sedation time 60 minutes Procedure: After the informed consent the patient was placed on the CT table on prone position. Localization CT scan was obtained. It demonstrates hypodense area in the right gluteus muscle. Subsequently a 17-gauge needle was advanced into the lesion. 20  mL of pus was aspirated. The materials were sent to the pathology. The patient tolerated the procedure without any immediate competition.     CT-guided aspiration of pus like material in the right gluteal muscle lesion.    Ec-echocardiogram Complete W/o Cont    Result Date: 3/13/2019  Transthoracic Echo Report Echocardiography Laboratory CONCLUSIONS No prior study is available for comparison. Normal left ventricular systolic function. Left ventricular ejection fraction is visually estimated to be 65%. Normal diastolic function. Normal inferior vena cava size and inspiratory collapse. Aortic sclerosis without stenosis. Estimated right  "ventricular systolic pressure  is 33 mmHg. No obvious valvular vegetations are noted on a decent quality study.  If further valvular assessment is clinically indicated, consider transesophageal echocardiogram. SAM,  LV EF:  65    % FINDINGS Left Ventricle Normal left ventricular size and systolic function. Normal left ventricular wall thickness. Left ventricular ejection fraction is visually estimated to be 65%. Normal diastolic function. Right Ventricle Normal right ventricular size and systolic function. Right Atrium Normal right atrial size. Normal inferior vena cava size and inspiratory collapse. Left Atrium Normal left atrial size. Mitral Valve Structurally normal mitral valve without significant stenosis or regurgitation. Aortic Valve Aortic sclerosis without stenosis. No aortic insufficiency. Tricuspid Valve Structurally normal tricuspid valve. Mild tricuspid regurgitation. Right atrial pressure is estimated to be 3 mmHg. Estimated right ventricular systolic pressure  is 33 mmHg. Pulmonic Valve The pulmonic valve is not well visualized. No pulmonic insufficiency. Pericardium Normal pericardium without effusion. Aorta The aortic root is normal.  Ascending aorta diameter is 3.0 cm. Claire Tripathi MD (Electronically Signed) Final Date:     13 March 2019                 14:38      Micro:  Results     Procedure Component Value Units Date/Time    BLOOD CULTURE [[id number]] Collected:  03/12/19 1826    Order Status:  Completed Specimen:  Blood from Peripheral Updated:  03/17/19 2100     Significant Indicator NEG     Source BLD     Site PERIPHERAL     Blood Culture No growth after 5 days of incubation.    Narrative:       Per Hospital Policy: Only change Specimen Src: to \"Line\" if  specified by physician order.    BLOOD CULTURE [[id number]] Collected:  03/12/19 1826    Order Status:  Completed Specimen:  Blood from Peripheral Updated:  03/17/19 2100     Significant Indicator NEG     Source BLD     Site PERIPHERAL    " " Blood Culture No growth after 5 days of incubation.    Narrative:       Per Hospital Policy: Only change Specimen Src: to \"Line\" if  specified by physician order.    CULTURE WOUND W/ GRAM STAIN [[id number]] Collected:  03/11/19 1800    Order Status:  Completed Specimen:  Wound Updated:  03/15/19 0743     Significant Indicator NEG     Source WND     Site Right Gluteal Muscles     Culture Result Wound No growth at 4 days     Gram Stain Result Moderate WBCs.  No organisms seen.      URINALYSIS [[id number]]  (Abnormal) Collected:  03/13/19 0450    Order Status:  Completed Specimen:  Urine from Urine, Clean Catch Updated:  03/13/19 0503     Color Yellow     Character Clear     Specific Gravity 1.015     Ph 5.5     Glucose Negative mg/dL      Ketones Negative mg/dL      Protein Negative mg/dL      Bilirubin Negative     Urobilinogen, Urine 0.2     Nitrite Negative     Leukocyte Esterase Trace (A)     Occult Blood Negative     Micro Urine Req Microscopic    Narrative:       Collected By:[id number] CINDY VINES    URINALYSIS [[id number]] Collected:  03/13/19 0450    Order Status:  Canceled Specimen:  Urine from Urine, Clean Catch           Assessment:  Active Hospital Problems    Diagnosis   • *Lesion of brain [G93.9]   • Mass of iliopsoas muscle group [M62.89]   • Encephalopathy [G93.40]   • Hyponatremia [E87.1]   • History of breast cancer [Z85.3]   • Diabetes mellitus type 2 in obese (HCC) [E11.69, E66.9]       Plan:  Multiple brain lesions  Low-grade fevers resolved  No current leukocytosis  Persistent AMS-waxing and waning, but improved  MRI with scattered small lesions incl aaron  TTE neg; CHAS neg  Bcxs neg to date  Brain lesions too small to biopsy   Dr. Nguyen of neurosurgery recommends repeating imaging in 2 weeks    Iliopsoas mass, on treatment  CT + 2.6 cm lesion in the right iliopsoas region as well as a 2.8x2.1 cm lesion in the right gluteal musculature   S/p aspiration +WBCs-cultures negative to date  Bcxs remain " neg  Discontinued Vanco secondary to elevated Vanco troughs and patient not clearing well  Continue cefepime and linezolid  Anticipate 14 day course  Stop date 3/28/19  Monitor CBC while on linezolid    H/o breast cancer  Markedly elevated alk phos  Recommend continued diaz for mets    Type 2 diabetes mellitus  Hemoglobin A1c 6.3  Keep BS under 150 to help control current infection    I have performed a physical exam and reviewed and updated ROS and plan today 3/19/2019.  In review of yesterday's note 3/18/2019, there are no changes except as documented above.

## 2019-03-19 NOTE — DISCHARGE PLANNING
Agency/Facility Name: [facility]  Outcome: no answer. [facility] will follow up again.     Agency/Facility Name: [facility]  Outcome: fax continues to fail. [facility] sent fax manually @ [phone].     Rufina (BREANNA) notified.

## 2019-03-19 NOTE — CARE PLAN
Problem: Communication  Goal: The ability to communicate needs accurately and effectively will improve  Outcome: PROGRESSING AS EXPECTED  Discussed POC jerry pt, all questions answered    Problem: Safety  Goal: Will remain free from injury  Outcome: PROGRESSING AS EXPECTED  Bed alarm in place, close to nursing station

## 2019-03-19 NOTE — PROGRESS NOTES
"[facility] Medicine Daily Progress Note    Date of Service  3/19/2019    Chief Complaint  68 y.o. female admitted 3/8/2019 with right hip pain, fever and abnormal brain mri.    Hospital Course    Patient started on empiric antibiotics given fever.  She had abnormal findings on MRI brain and CT showed \"lesion\" on right iliopsoas.  This lesion was biopsied and turned out to be abscess.  She has history of breast cancer and there is also concern of brain lesions being metastatic though their appearance is not typical of this.      Interval Problem Update  3/12 Discussed with Dr Turner for second opinion of brain lesions and based on appearance not able to r/o or r/i any diagnosis.  Given patient's presentation of confusion, hip pain and fever  - this is more supportive of infectious etiology rather than malignant.  Fluid from right gluteal area sent to micro and as of yet - no growth.  Continue zosyn for now.  3/13 Patient with slight improvement in mentation.  Blood cultures and abscess cultures are showing no growth at this time.  TTE being done while I examined patient - no evidence of vegetations on this test.  ID consultation pending - d/w Dr Garcia.  3/14 Patient feeling well today, her pain is present but not as bad as prior to admission.  She was able to follow the conversation today and is agreeable to move forward with the CHAS tomorrow.  I spoke with her brother, René, and updated him on condition yesterday afternoon also.  Will also have PICC placed in next few days as long as blood cultures remain negative as I expect a prolonged antibiotic course of treatment.  3/15 Patient without new complaints, states she needs help to move in bed.  CHAS showed no evidence of vegetations and regular diet resumed.  Culture remains negative and biopsy of brain lesion may prove needed - will watch through the weekend and if mentation does not continue to improve, will discuss with neurosurgery on Monday.  3/16 Patient states " she is okay today, mentation has waxed and waned without significant improvement.  She was febrile earlier today.  No other acute events.  3/17 Patient with significant improvement in mentation today, she is aware of her hospitalization, not falling asleep mid sentence.  Her pain mediations were held most of yesterday and likely far too strong for patient and were affecting her awareness.  Oxycodone 5 discontinued and will use tramadol instead unless pain not well controlled.  CT brain with contrast ordered as a quick scan for comparison to MRI.    3/18 Patient feeling same today, discussed need to discuss with neurosurgery for possible biopsy.  Discussed with Dr Nguyen, he reviewed MRI and CT brain and he feels they are likely metastatic lesions but are far too small to biopsy.  His recommendation is to continue with antibiotics and re-image in 2 weeks to see if any change that would be amenable to biopsy or that would further declare infection.    3/19: no acute issue overnight. Continuing IV abx to complete 2 weeks and repeat imaging after. Denies headache, chest pain, nausea, vomiting.    Consultants/Specialty  ID - de    Code Status  full    Disposition  SNF    Review of Systems  Review of Systems   Constitutional: Negative for fever.   HENT: Negative for congestion and sore throat.    Eyes: Negative for blurred vision and photophobia.   Respiratory: Negative for cough and shortness of breath.    Cardiovascular: Negative for chest pain and claudication.   Gastrointestinal: Negative for abdominal pain, heartburn, nausea and vomiting.   Genitourinary: Negative for dysuria and hematuria.   Musculoskeletal: Positive for myalgias (hip pain). Negative for joint pain (left hip pain).   Skin: Negative for itching and rash.   Neurological: Negative for dizziness, sensory change and weakness.   Psychiatric/Behavioral: Negative for depression. The patient is not nervous/anxious and does not have insomnia.          Physical Exam  Temp:  [36.2 °C (97.2 °F)-37.1 °C (98.8 °F)] 36.3 °C (97.4 °F)  Pulse:  [78-97] 78  Resp:  [22-24] 24  BP: (109-150)/(48-73) 150/73  SpO2:  [92 %-98 %] 94 %    Physical Exam   Constitutional: She is oriented to person, place, and time. She appears well-developed and well-nourished. No distress.   HENT:   Head: Normocephalic and atraumatic.   Eyes: Pupils are equal, round, and reactive to light. Conjunctivae are normal. No scleral icterus.   Neck: Neck supple. No JVD present.   Cardiovascular: Normal rate, regular rhythm and normal heart sounds.  Exam reveals no gallop and no friction rub.    No murmur heard.  Pulmonary/Chest: Effort normal and breath sounds normal. No respiratory distress.   Abdominal: Soft. Bowel sounds are normal. She exhibits no distension.   Musculoskeletal: She exhibits no edema or tenderness.   Neurological: She is alert and oriented to person, place, and time. No cranial nerve deficit.   Skin: Skin is warm and dry. She is not diaphoretic. No erythema.   Psychiatric: She has a normal mood and affect. Her behavior is normal.   Nursing note and vitals reviewed.      Fluids    Intake/Output Summary (Last 24 hours) at 03/19/19 0801  Last data filed at 03/18/19 1600   Gross per 24 hour   Intake              720 ml   Output                0 ml   Net              720 ml       Laboratory  Recent Labs      03/16/19   0850  03/17/19   1221  03/18/19   0014   WBC  7.9  7.7  6.0   RBC  5.05  4.75  4.90   HEMOGLOBIN  12.5  12.0  12.2   HEMATOCRIT  40.6  38.2  39.2   MCV  80.4*  80.4*  80.0*   MCH  24.8*  25.3*  24.9*   MCHC  30.8*  31.4*  31.1*   RDW  51.9*  52.5*  51.9*   PLATELETCT  260  276  248   MPV  9.7  10.6  10.1     Recent Labs      03/17/19   1221  03/18/19   0014  03/19/19   0024   SODIUM  133*  134*  134*   POTASSIUM  3.9  4.0  4.0   CHLORIDE  98  99  98   CO2  26  26  28   GLUCOSE  147*  142*  123*   BUN  26*  25*  28*   CREATININE  0.89  0.84  1.07   CALCIUM  10.2  10.6*   11.3*                   Imaging  CT-HEAD WITH   Final Result      Multiple subcentimeter too numerous to count enhancing masses scattered throughout the supratentorial and infratentorial brain. These demonstrate some surrounding vasogenic edema and most likely represent multifocal metastatic lesions. Other    considerations would include multifocal abscesses or septic emboli.      EC-CHAS W/O CONT   Final Result      EC-CHAS W/O CONT   Final Result      CT-NEEDLE BX-MUSCLE RIGHT   Final Result      CT-guided aspiration of pus like material in the right gluteal muscle lesion.      EC-ECHOCARDIOGRAM COMPLETE W/O CONT   Final Result      OUTSIDE IMAGES-CT CHEST/ABDOMEN/PELVIS   Final Result      OUTSIDE IMAGES-DX CHEST   Final Result      FM-[id number]-[id number] FILM X-RAY   Final Result      OUTSIDE IMAGES-MR BRAIN   Final Result      OUTSIDE IMAGES-MR BRAIN   Final Result           Assessment/Plan  * Lesion of brain   Assessment & Plan    Metastatic cancer vs infectious etiology  D/w Dr Black for questionable brain mets, recommending IR Bx of psoas muscle   IR Bx of psoas muscle ordered - was abscess  Empiric treatment of infection  TTE and CHAS negative for vegetations.  Repeat CT head with contrast for evaluation as mentation waxes and wanes. Lesions stable  D/w Dr Nguyen - lesions likely metastatic based on his interpretation of MRI/CT - too small to biopsy, recommends continuing antibiotics and re-imaging in 2 weeks to see if there has been any change.         Encephalopathy   Assessment & Plan    Likely toxic metabolic from infection  Improved       Mass of iliopsoas muscle group   Assessment & Plan    IR Bx of psoas muscle completed, fluid drained - abscess, no growth  On linezolid and cefepime and last dose 3/28         Normocytic anemia   Assessment & Plan    stable     RLS (restless legs syndrome)- (present on admission)   Assessment & Plan    Pramipexole       Diabetes mellitus type 2 in obese (HCC)- (present on  admission)   Assessment & Plan    Glycated hemoglobin 6.3, 3 months ago  Well controlled   Short acting insulin as needed   Accu-Checks, hypoglycemia protocol          History of breast cancer- (present on admission)   Assessment & Plan    Mets vs infection to brain  No known active breast cancer.       COPD (chronic obstructive pulmonary disease) (HCC)- (present on admission)   Assessment & Plan    Oxygen as needed, Respiratory protocol, Bronchodilators, Incentive spirometry      History of deep vein thrombosis- (present on admission)   Assessment & Plan    History of deep vein thrombosis   Was on Rivaroxaban as outpatient.     Held for Bx Mar 11  On DVT prophylaxis for now         Essential hypertension- (present on admission)   Assessment & Plan    Restarted on losartan and furosemide with hold parameters.     GERD (gastroesophageal reflux disease)- (present on admission)   Assessment & Plan    Omeprazole     Chronic pain- (present on admission)   Assessment & Plan     Multifactorial  Likely secondary to abscess, fracture  Pain control            VTE prophylaxis: scds

## 2019-03-19 NOTE — THERAPY
"Pt demonstrtating decreased functional mobility. Pt rewuired facilitation for weight shifting in standing, unable to clear feet for ambulation 2'LE weakness. Pt continues to demonstrate posterior lean upon standing, requiring verbal cuing and facilitation. Pt would benefit from further acute PT txs to progress towards goals and independence. Recommend post acute placement at an inpatient transitional care facility to address deficits.    Physical Therapy Treatment completed.   Bed Mobility:  Supine to Sit: Moderate Assist  Transfers: Sit to Stand: Minimal Assist  Gait: Level Of Assist: Unable to Participate       Plan of Care: Will benefit from Physical Therapy 3 times per week    See \"Rehab Therapy-Acute\" Patient Summary Report for complete documentation.       "

## 2019-03-19 NOTE — CARE PLAN
Problem: Skin Integrity  Goal: Risk for impaired skin integrity will decrease    Intervention: Assess and monitor skin integrity, appearance and/or temperature  Skin assessed frequently throughout shift. Pt incontinent of bowel and bladder. Skin kept clean and dry throughout shift. Skin remains intact. Pt repositioned frequently.       Problem: Respiratory:  Goal: Respiratory status will improve    Intervention: Administer and titrate oxygen therapy  Pt remains on 2 L o2. Pt desats when o2 removed.  worn throughout shift.       Problem: Pain Management  Goal: Pain level will decrease to patient's comfort goal    Intervention: Follow pain managment plan developed in collaboration with patient and Interdisciplinary Team  Pt denied pain most of shift. Did medicate with prn tramadol 1x. When reassessed pt resting comfortably.

## 2019-03-19 NOTE — PROGRESS NOTES
Pt A/O x 3, disoriented to time, sitting up in bed eating breakfast. 22G PIV  in place - NS running 15 mL/hr - CDI.   No complaints of pain or nausea. Pt up 1-2 assist with FWW. Bed in lowest, locked position. Call light and personal belongings within reach. Bed alarm on. Pt has no further questions or concerns at this time. Hourly rounding in place.     /73   Pulse 78   Temp 36.3 °C (97.4 °F) (Temporal)   Resp (!) 24   Ht 1.524 m (5')   Wt 62.9 kg (138 lb 10.7 oz)   SpO2 94%   Breastfeeding? No   BMI 27.08 kg/m²

## 2019-03-19 NOTE — THERAPY
"Occupational Therapy Treatment completed with focus on ADLs, ADL transfers and patient education.  Functional Status:  Pt pleasant but has delayed responses & is not oriented to date/time.  Pt requires extra time to process simple commands.  Pt able to perform grooming tasks seated with supervision & extra time.  Pt was Mod A for LB dressing.  Pt is also soft spoken & hard to hear at times.  Pt is Min A for ADL transfers.  Pt encouraged to be OOB for all meals.  Pt continues to have generalized weakness.  Plan of Care: Will benefit from Occupational Therapy 3 times per week  Discharge Recommendations:  Equipment Will Continue to Assess for Equipment Needs. Post-acute therapy Discharge to a transitional care facility for continued skilled therapy services.    See \"Rehab Therapy-Acute\" Patient Summary Report for complete documentation.   "

## 2019-03-20 LAB
ALBUMIN SERPL BCP-MCNC: 3.4 G/DL (ref 3.2–4.9)
ALBUMIN/GLOB SERPL: 0.8 G/DL
ALP SERPL-CCNC: 275 U/L (ref 30–99)
ALT SERPL-CCNC: 14 U/L (ref 2–50)
ANION GAP SERPL CALC-SCNC: 8 MMOL/L (ref 0–11.9)
AST SERPL-CCNC: 28 U/L (ref 12–45)
BILIRUB SERPL-MCNC: 0.5 MG/DL (ref 0.1–1.5)
BUN SERPL-MCNC: 30 MG/DL (ref 8–22)
CA-I SERPL-SCNC: 1.5 MMOL/L (ref 1.1–1.3)
CALCIUM SERPL-MCNC: 11.2 MG/DL (ref 8.5–10.5)
CHLORIDE SERPL-SCNC: 99 MMOL/L (ref 96–112)
CO2 SERPL-SCNC: 26 MMOL/L (ref 20–33)
CREAT SERPL-MCNC: 1.03 MG/DL (ref 0.5–1.4)
GLOBULIN SER CALC-MCNC: 4.3 G/DL (ref 1.9–3.5)
GLUCOSE SERPL-MCNC: 128 MG/DL (ref 65–99)
POTASSIUM SERPL-SCNC: 3.9 MMOL/L (ref 3.6–5.5)
PROT SERPL-MCNC: 7.7 G/DL (ref 6–8.2)
PTH-INTACT SERPL-MCNC: 6.6 PG/ML (ref 14–72)
SODIUM SERPL-SCNC: 133 MMOL/L (ref 135–145)

## 2019-03-20 PROCEDURE — 82330 ASSAY OF CALCIUM: CPT

## 2019-03-20 PROCEDURE — 99232 SBSQ HOSP IP/OBS MODERATE 35: CPT | Performed by: INTERNAL MEDICINE

## 2019-03-20 PROCEDURE — 700111 HCHG RX REV CODE 636 W/ 250 OVERRIDE (IP): Performed by: INTERNAL MEDICINE

## 2019-03-20 PROCEDURE — A9270 NON-COVERED ITEM OR SERVICE: HCPCS | Performed by: INTERNAL MEDICINE

## 2019-03-20 PROCEDURE — A9270 NON-COVERED ITEM OR SERVICE: HCPCS | Performed by: HOSPITALIST

## 2019-03-20 PROCEDURE — 700102 HCHG RX REV CODE 250 W/ 637 OVERRIDE(OP): Performed by: INTERNAL MEDICINE

## 2019-03-20 PROCEDURE — 83970 ASSAY OF PARATHORMONE: CPT

## 2019-03-20 PROCEDURE — 700102 HCHG RX REV CODE 250 W/ 637 OVERRIDE(OP): Performed by: HOSPITALIST

## 2019-03-20 PROCEDURE — 80053 COMPREHEN METABOLIC PANEL: CPT

## 2019-03-20 PROCEDURE — 36415 COLL VENOUS BLD VENIPUNCTURE: CPT

## 2019-03-20 PROCEDURE — 770004 HCHG ROOM/CARE - ONCOLOGY PRIVATE *

## 2019-03-20 PROCEDURE — 700105 HCHG RX REV CODE 258: Performed by: INTERNAL MEDICINE

## 2019-03-20 RX ADMIN — CEFEPIME 2 G: 2 INJECTION, POWDER, FOR SOLUTION INTRAVENOUS at 17:33

## 2019-03-20 RX ADMIN — CEFEPIME 2 G: 2 INJECTION, POWDER, FOR SOLUTION INTRAVENOUS at 04:51

## 2019-03-20 RX ADMIN — GABAPENTIN 300 MG: 300 CAPSULE ORAL at 17:33

## 2019-03-20 RX ADMIN — POTASSIUM CHLORIDE 40 MEQ: 1500 TABLET, EXTENDED RELEASE ORAL at 04:52

## 2019-03-20 RX ADMIN — POTASSIUM CHLORIDE 40 MEQ: 1500 TABLET, EXTENDED RELEASE ORAL at 17:32

## 2019-03-20 RX ADMIN — LINEZOLID 600 MG: 600 TABLET, FILM COATED ORAL at 04:51

## 2019-03-20 RX ADMIN — PRAMIPEXOLE DIHYDROCHLORIDE 0.25 MG: 0.25 TABLET ORAL at 20:14

## 2019-03-20 RX ADMIN — GABAPENTIN 300 MG: 300 CAPSULE ORAL at 04:52

## 2019-03-20 RX ADMIN — ENOXAPARIN SODIUM 40 MG: 100 INJECTION SUBCUTANEOUS at 04:52

## 2019-03-20 RX ADMIN — FUROSEMIDE 40 MG: 40 TABLET ORAL at 04:52

## 2019-03-20 RX ADMIN — OMEPRAZOLE 20 MG: 20 CAPSULE, DELAYED RELEASE ORAL at 04:52

## 2019-03-20 RX ADMIN — LOSARTAN POTASSIUM 50 MG: 50 TABLET ORAL at 04:52

## 2019-03-20 RX ADMIN — TRAMADOL HYDROCHLORIDE 50 MG: 50 TABLET, COATED ORAL at 17:35

## 2019-03-20 RX ADMIN — LINEZOLID 600 MG: 600 TABLET, FILM COATED ORAL at 20:14

## 2019-03-20 ASSESSMENT — ENCOUNTER SYMPTOMS
CLAUDICATION: 0
BLURRED VISION: 0
PHOTOPHOBIA: 0
DEPRESSION: 0
SHORTNESS OF BREATH: 0
NAUSEA: 0
MYALGIAS: 1
INSOMNIA: 0
DIZZINESS: 0
SORE THROAT: 0
HEARTBURN: 0
COUGH: 0
NERVOUS/ANXIOUS: 0
FEVER: 0
SENSORY CHANGE: 0
ABDOMINAL PAIN: 0

## 2019-03-20 NOTE — DISCHARGE PLANNING
Met with PT and family. Pt still somewhat confused but awake and alert. Family included brother and 2 neices. Lengthy conversation regarding PT being transferred either to another hospital to complete antbx therapy or SNF once antbx therapy is completed. Explained the details of getting her transferred to another hospital and that this would involve an accepting hospital and then she would transfer again to SNF once antbiotics completed. Family is from [facility] and would like her closer to them as soon as possible. SNF placement still pending referrals sent to three different SNFs in the area near her brother. Informed family that we will hopefully have an accepting SNF by tomorrow but more than likely they will want her to complete the antbx therapy in the hospital then transfer. Informed them if they do want her transferred to a hospital in [place] to notify this  RN and hospital as to which hospital they wish to have her transferred to. Will f/u with PT and family tomorrow.

## 2019-03-20 NOTE — DISCHARGE PLANNING
Left voicemail with brother regarding acceptance by SNF in [place]. Will await return call and will attempt to reach him again tomorrow.

## 2019-03-20 NOTE — PROGRESS NOTES
Infectious Disease Progress Note    Author: Ashely Hinojosa M.D. Date & Time of service: 3/20/2019  10:23 AM    Chief Complaint:  Multiple brain lesions  Iliopsoas lesion    Interval History:  68-year-old female with history of diabetes and breast cancer, admitted 3/8/2019 with abdominal pain, iliopsoas lesion, and an abnormal MRI with multiple brain lesions.  3/14 AF (99.2) WBC 5.8 more awake today as compared to yesterday but not oriented-denies pain, following commands. ECHO reviewed  3/15 AF (99.1) no WBC today Had CHAS today  3/16 temp 100.5 WBC 7.9 more confused today-naked but pushing off sheets  3/17 AF WBC not done Less obtunded today-not oriented. Denies pain, N/V, HA, visual changes etc  3/18 afebrile WBC 6 patient sitting up in bed eating breakfast, she is quite soft spoken.  She denies any headache, nausea or vomiting.  Endorses a mild cough.  3/19 AF pt denies any new symptoms, no HA. Brain lesions too small for biopsy per notes. Neurosurgery recommends repeating scan in 2 weeks.  3/20 afebrile patient is sleeping and difficult to arouse    Labs Reviewed    Review of Systems:  Review of Systems   Unable to perform ROS: Other   Patient sleeping and difficult to arouse    Hemodynamics:  Temp (24hrs), Av.4 °C (97.6 °F), Min:36.1 °C (97 °F), Max:36.9 °C (98.5 °F)  Temperature: 36.9 °C (98.5 °F)  Pulse  Av.6  Min: 70  Max: 153  Blood Pressure : 129/70       Physical Exam:  Physical Exam   Constitutional: She appears well-developed.   Elderly   HENT:   Head: Normocephalic and atraumatic.   Mouth/Throat: Oropharynx is clear and moist. No oropharyngeal exudate.   Eyes: Right eye exhibits no discharge. Left eye exhibits no discharge. No scleral icterus.   Neck: Neck supple. No JVD present.   Cardiovascular: Normal rate and intact distal pulses.    Murmur heard.  Pulmonary/Chest: Effort normal. No stridor. No respiratory distress.   Well-healed left breast mastectomy scar   Abdominal: Soft. Bowel  sounds are normal. She exhibits no distension. There is no tenderness.   Musculoskeletal: She exhibits no edema, tenderness or deformity.   Lymphadenopathy:     She has no cervical adenopathy.   Neurological:   Sleeping   Skin: Skin is warm. No rash noted. She is not diaphoretic.   Nursing note and vitals reviewed.      Meds:    Current Facility-Administered Medications:   •  NS  •  linezolid  •  labetalol  •  ibuprofen  •  potassium chloride SA  •  haloperidol lactate  •  cefepime  •  enoxaparin (LOVENOX) injection  •  pramipexole  •  furosemide  •  tramadol  •  losartan  •  gabapentin  •  omeprazole  •  polyethylene glycol/lytes  •  senna-docusate **AND** polyethylene glycol/lytes **AND** magnesium hydroxide **AND** bisacodyl  •  Respiratory Care per Protocol  •  Respiratory Care per Protocol  •  acetaminophen  •  Notify provider if pain remains uncontrolled **AND** Use the numeric rating scale (NRS-11) on regular floors and Critical-Care Pain Observation Tool (CPOT) on ICUs/Trauma to assess pain **AND** Pulse Ox (Oximetry) **AND** Pharmacy Consult Request **AND** If patient difficult to arouse and/or has respiratory depression, stop any opiates that are currently infusing and call a Rapid Response. **AND** oxyCODONE immediate-release **AND** [DISCONTINUED] oxyCODONE immediate-release **AND** morphine injection  •  ondansetron  •  ondansetron    Labs:  Recent Labs      03/17/19   1221  03/18/19   0014   WBC  7.7  6.0   RBC  4.75  4.90   HEMOGLOBIN  12.0  12.2   HEMATOCRIT  38.2  39.2   MCV  80.4*  80.0*   MCH  25.3*  24.9*   RDW  52.5*  51.9*   PLATELETCT  276  248   MPV  10.6  10.1   NEUTSPOLYS  79.80*  70.60   LYMPHOCYTES  8.70*  14.10*   MONOCYTES  9.70  11.10   EOSINOPHILS  0.90  3.00   BASOPHILS  0.50  0.70     Recent Labs      03/17/19   1221  03/18/19   0014  03/19/19   0024  03/20/19   0008   SODIUM  133*  134*  134*  133*   POTASSIUM  3.9  4.0  4.0  3.9   CHLORIDE  98  99  98  99   CO2  26  26  28  26    GLUCOSE  147*  142*  123*  128*   BUN  26*  25*  28*  30*   CPKTOTAL  47   --    --    --      Recent Labs      03/18/19   0014  03/19/19   0024  03/20/19   0008   ALBUMIN  3.4  3.2  3.4   TBILIRUBIN  0.5  0.5  0.5   ALKPHOSPHAT  339*  319*  275*   TOTPROTEIN  8.2  7.5  7.7   ALTSGPT  18  20  14   ASTSGOT  39  34  28   CREATININE  0.84  1.07  1.03       Imaging:  Ct-needle Bx-muscle Right    Result Date: 3/13/2019  3/11/2019 4:44 PM HISTORY/REASON FOR EXAM:  Right gluteal hypodense lesion. Moderate sedation was administered with continuous monitoring of the patient under the direct supervision of  Medications: 2 mg of Versed and 200 mcg of fentanyl Total sedation time 60 minutes Procedure: After the informed consent the patient was placed on the CT table on prone position. Localization CT scan was obtained. It demonstrates hypodense area in the right gluteus muscle. Subsequently a 17-gauge needle was advanced into the lesion. 20  mL of pus was aspirated. The materials were sent to the pathology. The patient tolerated the procedure without any immediate competition.     CT-guided aspiration of pus like material in the right gluteal muscle lesion.    Ec-echocardiogram Complete W/o Cont    Result Date: 3/13/2019  Transthoracic Echo Report Echocardiography Laboratory CONCLUSIONS No prior study is available for comparison. Normal left ventricular systolic function. Left ventricular ejection fraction is visually estimated to be 65%. Normal diastolic function. Normal inferior vena cava size and inspiratory collapse. Aortic sclerosis without stenosis. Estimated right ventricular systolic pressure  is 33 mmHg. No obvious valvular vegetations are noted on a decent quality study.  If further valvular assessment is clinically indicated, consider transesophageal echocardiogram. SAM,  LV EF:  65    % FINDINGS Left Ventricle Normal left ventricular size and systolic function. Normal left ventricular wall  "thickness. Left ventricular ejection fraction is visually estimated to be 65%. Normal diastolic function. Right Ventricle Normal right ventricular size and systolic function. Right Atrium Normal right atrial size. Normal inferior vena cava size and inspiratory collapse. Left Atrium Normal left atrial size. Mitral Valve Structurally normal mitral valve without significant stenosis or regurgitation. Aortic Valve Aortic sclerosis without stenosis. No aortic insufficiency. Tricuspid Valve Structurally normal tricuspid valve. Mild tricuspid regurgitation. Right atrial pressure is estimated to be 3 mmHg. Estimated right ventricular systolic pressure  is 33 mmHg. Pulmonic Valve The pulmonic valve is not well visualized. No pulmonic insufficiency. Pericardium Normal pericardium without effusion. Aorta The aortic root is normal.  Ascending aorta diameter is 3.0 cm. Claire Tripathi MD (Electronically Signed) Final Date:     13 March 2019                 14:38      Micro:  Results     Procedure Component Value Units Date/Time    BLOOD CULTURE [[id number]] Collected:  03/12/19 1826    Order Status:  Completed Specimen:  Blood from Peripheral Updated:  03/17/19 2100     Significant Indicator NEG     Source BLD     Site PERIPHERAL     Blood Culture No growth after 5 days of incubation.    Narrative:       Per Hospital Policy: Only change Specimen Src: to \"Line\" if  specified by physician order.    BLOOD CULTURE [[id number]] Collected:  03/12/19 1826    Order Status:  Completed Specimen:  Blood from Peripheral Updated:  03/17/19 2100     Significant Indicator NEG     Source BLD     Site PERIPHERAL     Blood Culture No growth after 5 days of incubation.    Narrative:       Per Hospital Policy: Only change Specimen Src: to \"Line\" if  specified by physician order.    CULTURE WOUND W/ GRAM STAIN [[id number]] Collected:  03/11/19 1800    Order Status:  Completed Specimen:  Wound Updated:  03/15/19 0743     Significant Indicator NEG     Source " WND     Site Right Gluteal Muscles     Culture Result Wound No growth at 4 days     Gram Stain Result Moderate WBCs.  No organisms seen.            Assessment:  Active Hospital Problems    Diagnosis   • *Lesion of brain [G93.9]   • Mass of iliopsoas muscle group [M62.89]   • Encephalopathy [G93.40]   • Hyponatremia [E87.1]   • History of breast cancer [Z85.3]   • Diabetes mellitus type 2 in obese (HCC) [E11.69, E66.9]       Plan:  Multiple brain lesions  Low-grade fevers resolved  No current leukocytosis  Persistent AMS-waxing and waning, but improved  MRI with scattered small lesions incl aaron  TTE neg; CHAS neg  Bcxs neg to date  Brain lesions too small to biopsy   Dr. Nguyen of neurosurgery recommends repeating imaging in 2 weeks    Iliopsoas mass, on treatment  CT + 2.6 cm lesion in the right iliopsoas region as well as a 2.8x2.1 cm lesion in the right gluteal musculature   S/p aspiration +WBCs-cultures negative to date  Bcxs remain neg  Discontinued Vanco secondary to elevated Vanco troughs and patient not clearing well  Continue cefepime and linezolid  Anticipate 14 day course  Stop date 3/28/19  Monitor CBC while on linezolid    H/o breast cancer  Markedly elevated alk phos  Recommend continued diaz for mets    Type 2 diabetes mellitus  Hemoglobin A1c 6.3  Keep BS under 150 to help control current infection    I have performed a physical exam and reviewed and updated ROS and plan today 3/20/2019.  In review of yesterday's note 3/19/2019, there are no changes except as documented above.

## 2019-03-20 NOTE — PROGRESS NOTES
Patient asleep in bed. No signs of pain or discomfort. Bed in lowest, locked position. Call light within reach.     /70   Pulse 90   Temp 36.9 °C (98.5 °F) (Oral)   Resp 18   Ht 1.524 m (5')   Wt 62.9 kg (138 lb 10.7 oz)   SpO2 95%   Breastfeeding? No   BMI 27.08 kg/m²       1000: Patient is tearful, slow to respond. Patient is having a hard time feeding herself this morning. Patient is able to  a fork but not able to move the fork to her mouth. - MD updated.     1730: Patient is alert to place, self, and situation. Patient's hand mouth coordination was better for dinner. Patient only needed minimal assistance.

## 2019-03-20 NOTE — PROGRESS NOTES
"[facility] Medicine Daily Progress Note    Date of Service  3/20/2019    Chief Complaint  68 y.o. female admitted 3/8/2019 with right hip pain, fever and abnormal brain mri.    Hospital Course    Patient started on empiric antibiotics given fever.  She had abnormal findings on MRI brain and CT showed \"lesion\" on right iliopsoas.  This lesion was biopsied and turned out to be abscess.  She has history of breast cancer and there is also concern of brain lesions being metastatic though their appearance is not typical of this.      Interval Problem Update  3/12 Discussed with Dr Truner for second opinion of brain lesions and based on appearance not able to r/o or r/i any diagnosis.  Given patient's presentation of confusion, hip pain and fever  - this is more supportive of infectious etiology rather than malignant.  Fluid from right gluteal area sent to micro and as of yet - no growth.  Continue zosyn for now.  3/13 Patient with slight improvement in mentation.  Blood cultures and abscess cultures are showing no growth at this time.  TTE being done while I examined patient - no evidence of vegetations on this test.  ID consultation pending - d/w Dr Garcia.  3/14 Patient feeling well today, her pain is present but not as bad as prior to admission.  She was able to follow the conversation today and is agreeable to move forward with the CHAS tomorrow.  I spoke with her brother, René, and updated him on condition yesterday afternoon also.  Will also have PICC placed in next few days as long as blood cultures remain negative as I expect a prolonged antibiotic course of treatment.  3/15 Patient without new complaints, states she needs help to move in bed.  CHAS showed no evidence of vegetations and regular diet resumed.  Culture remains negative and biopsy of brain lesion may prove needed - will watch through the weekend and if mentation does not continue to improve, will discuss with neurosurgery on Monday.  3/16 Patient states " she is okay today, mentation has waxed and waned without significant improvement.  She was febrile earlier today.  No other acute events.  3/17 Patient with significant improvement in mentation today, she is aware of her hospitalization, not falling asleep mid sentence.  Her pain mediations were held most of yesterday and likely far too strong for patient and were affecting her awareness.  Oxycodone 5 discontinued and will use tramadol instead unless pain not well controlled.  CT brain with contrast ordered as a quick scan for comparison to MRI.    3/18 Patient feeling same today, discussed need to discuss with neurosurgery for possible biopsy.  Discussed with Dr Nguyen, he reviewed MRI and CT brain and he feels they are likely metastatic lesions but are far too small to biopsy.  His recommendation is to continue with antibiotics and re-image in 2 weeks to see if any change that would be amenable to biopsy or that would further declare infection.    3/19: no acute issue overnight. Continuing IV abx to complete 2 weeks and repeat imaging after. Denies headache, chest pain, nausea, vomiting.    3/20: the patient is sleeping comfortably. No acute issue overnight.    Consultants/Specialty  ID - de    Code Status  full    Disposition  SNF    Review of Systems  Review of Systems   Constitutional: Positive for malaise/fatigue. Negative for fever.   HENT: Negative for congestion and sore throat.    Eyes: Negative for blurred vision and photophobia.   Respiratory: Negative for cough and shortness of breath.    Cardiovascular: Negative for chest pain and claudication.   Gastrointestinal: Negative for abdominal pain, heartburn and nausea.   Genitourinary: Negative for dysuria and hematuria.   Musculoskeletal: Positive for myalgias (hip pain). Negative for joint pain (left hip pain).   Skin: Negative for itching and rash.   Neurological: Negative for dizziness and sensory change.   Psychiatric/Behavioral: Negative for  depression. The patient is not nervous/anxious and does not have insomnia.         Physical Exam  Temp:  [36.1 °C (97 °F)-36.6 °C (97.8 °F)] 36.6 °C (97.8 °F)  Pulse:  [] 102  Resp:  [18-20] 18  BP: (127-140)/(61-78) 140/61  SpO2:  [92 %-99 %] 97 %    Physical Exam   Constitutional: She is oriented to person, place, and time. She appears well-developed and well-nourished. No distress.   HENT:   Head: Normocephalic and atraumatic.   Eyes: Pupils are equal, round, and reactive to light.   Neck: Neck supple. No JVD present.   Cardiovascular: Normal rate, regular rhythm and normal heart sounds.    Pulmonary/Chest: Effort normal and breath sounds normal. No respiratory distress. She has no wheezes.   Abdominal: Soft. Bowel sounds are normal. She exhibits no distension. There is no tenderness.   Musculoskeletal: She exhibits no edema or tenderness.   Neurological: She is alert and oriented to person, place, and time. No cranial nerve deficit.   Skin: Skin is warm and dry. She is not diaphoretic. No erythema.   Psychiatric: She has a normal mood and affect. Her behavior is normal.   Nursing note and vitals reviewed.      Fluids    Intake/Output Summary (Last 24 hours) at 03/20/19 0742  Last data filed at 03/19/19 0900   Gross per 24 hour   Intake              370 ml   Output                0 ml   Net              370 ml       Laboratory  Recent Labs      03/17/19   1221  03/18/19   0014   WBC  7.7  6.0   RBC  4.75  4.90   HEMOGLOBIN  12.0  12.2   HEMATOCRIT  38.2  39.2   MCV  80.4*  80.0*   MCH  25.3*  24.9*   MCHC  31.4*  31.1*   RDW  52.5*  51.9*   PLATELETCT  276  248   MPV  10.6  10.1     Recent Labs      03/18/19   0014  03/19/19   0024  03/20/19   0008   SODIUM  134*  134*  133*   POTASSIUM  4.0  4.0  3.9   CHLORIDE  99  98  99   CO2  26  28  26   GLUCOSE  142*  123*  128*   BUN  25*  28*  30*   CREATININE  0.84  1.07  1.03   CALCIUM  10.6*  11.3*  11.2*                   Imaging  CT-HEAD WITH   Final Result       Multiple subcentimeter too numerous to count enhancing masses scattered throughout the supratentorial and infratentorial brain. These demonstrate some surrounding vasogenic edema and most likely represent multifocal metastatic lesions. Other    considerations would include multifocal abscesses or septic emboli.      EC-CHAS W/O CONT   Final Result      EC-CHAS W/O CONT   Final Result      CT-NEEDLE BX-MUSCLE RIGHT   Final Result      CT-guided aspiration of pus like material in the right gluteal muscle lesion.      EC-ECHOCARDIOGRAM COMPLETE W/O CONT   Final Result      OUTSIDE IMAGES-CT CHEST/ABDOMEN/PELVIS   Final Result      OUTSIDE IMAGES-DX CHEST   Final Result      [id number] FILM X-RAY   Final Result      OUTSIDE IMAGES-MR BRAIN   Final Result      OUTSIDE IMAGES-MR BRAIN   Final Result           Assessment/Plan  * Lesion of brain   Assessment & Plan    Metastatic cancer vs infectious etiology  D/w Dr Black for questionable brain mets, recommending IR Bx of psoas muscle   IR Bx of psoas muscle ordered - was abscess  Empiric treatment of infection  TTE and CHAS negative for vegetations.  Repeat CT head with contrast for evaluation as mentation waxes and wanes. Lesions stable  D/w Dr Nguyen - lesions likely metastatic based on his interpretation of MRI/CT - too small to biopsy, recommends continuing antibiotics and re-imaging in 2 weeks to see if there has been any change.         Encephalopathy   Assessment & Plan    Likely toxic metabolic from infection  Improved       Mass of iliopsoas muscle group   Assessment & Plan    IR Bx of psoas muscle completed, fluid drained - abscess, no growth  On linezolid and cefepime and last dose 3/28/19         Normocytic anemia   Assessment & Plan    stable     RLS (restless legs syndrome)- (present on admission)   Assessment & Plan    Pramipexole       Diabetes mellitus type 2 in obese (HCC)- (present on admission)   Assessment & Plan    Glycated hemoglobin 6.3, 3  months ago  Well controlled   Short acting insulin as needed   Accu-Checks, hypoglycemia protocol          History of breast cancer- (present on admission)   Assessment & Plan    Mets vs infection to brain  No known active breast cancer.       COPD (chronic obstructive pulmonary disease) (HCC)- (present on admission)   Assessment & Plan    Oxygen as needed, Respiratory protocol, Bronchodilators, Incentive spirometry      History of deep vein thrombosis- (present on admission)   Assessment & Plan    History of deep vein thrombosis   Was on Rivaroxaban as outpatient.     Held for Bx Mar 11  On DVT prophylaxis for now         Essential hypertension- (present on admission)   Assessment & Plan    Restarted on losartan and furosemide with hold parameters.     GERD (gastroesophageal reflux disease)- (present on admission)   Assessment & Plan    Omeprazole     Chronic pain- (present on admission)   Assessment & Plan     Multifactorial  Likely secondary to abscess, fracture  Pain control            VTE prophylaxis: scds

## 2019-03-20 NOTE — PROGRESS NOTES
Assumed care of patient at 1900. Pt is A&Ox2, disoriented to time and situation. Max assist to bedside commode. Bed alarm on. Pt is on 2 L O2 via NC,  in place. PIV patent and infusing. Denies pain or nausea at this time. No further needs at this time. Call light within reach, will continue to round hourly.

## 2019-03-20 NOTE — CARE PLAN
Problem: Safety  Goal: Will remain free from injury    Intervention: Provide assistance with mobility  Pt is A&Ox2, disoriented to time and situation, x2 assist. Bed alarm on. Bed locked in lowest position.       Problem: Pain Management  Goal: Pain level will decrease to patient's comfort goal    Intervention: Follow pain managment plan developed in collaboration with patient and Interdisciplinary Team  Pt reports pain using 0-10 scale, declines intervention at this time. Repositioned.

## 2019-03-21 LAB
ALBUMIN SERPL BCP-MCNC: 3.3 G/DL (ref 3.2–4.9)
ALBUMIN/GLOB SERPL: 0.9 G/DL
ALP SERPL-CCNC: 250 U/L (ref 30–99)
ALT SERPL-CCNC: 11 U/L (ref 2–50)
ANION GAP SERPL CALC-SCNC: 10 MMOL/L (ref 0–11.9)
ANISOCYTOSIS BLD QL SMEAR: ABNORMAL
AST SERPL-CCNC: 27 U/L (ref 12–45)
BASOPHILS # BLD AUTO: 0 % (ref 0–1.8)
BASOPHILS # BLD: 0 K/UL (ref 0–0.12)
BILIRUB SERPL-MCNC: 0.4 MG/DL (ref 0.1–1.5)
BUN SERPL-MCNC: 28 MG/DL (ref 8–22)
CALCIUM SERPL-MCNC: 11.2 MG/DL (ref 8.5–10.5)
CHLORIDE SERPL-SCNC: 105 MMOL/L (ref 96–112)
CO2 SERPL-SCNC: 23 MMOL/L (ref 20–33)
CREAT SERPL-MCNC: 0.81 MG/DL (ref 0.5–1.4)
EOSINOPHIL # BLD AUTO: 0.43 K/UL (ref 0–0.51)
EOSINOPHIL NFR BLD: 6.2 % (ref 0–6.9)
ERYTHROCYTE [DISTWIDTH] IN BLOOD BY AUTOMATED COUNT: 53.6 FL (ref 35.9–50)
GLOBULIN SER CALC-MCNC: 3.6 G/DL (ref 1.9–3.5)
GLUCOSE SERPL-MCNC: 106 MG/DL (ref 65–99)
HCT VFR BLD AUTO: 39.2 % (ref 37–47)
HGB BLD-MCNC: 11.6 G/DL (ref 12–16)
LYMPHOCYTES # BLD AUTO: 1.16 K/UL (ref 1–4.8)
LYMPHOCYTES NFR BLD: 16.8 % (ref 22–41)
MANUAL DIFF BLD: NORMAL
MCH RBC QN AUTO: 24.5 PG (ref 27–33)
MCHC RBC AUTO-ENTMCNC: 29.6 G/DL (ref 33.6–35)
MCV RBC AUTO: 82.7 FL (ref 81.4–97.8)
MICROCYTES BLD QL SMEAR: ABNORMAL
MONOCYTES # BLD AUTO: 0.43 K/UL (ref 0–0.85)
MONOCYTES NFR BLD AUTO: 6.2 % (ref 0–13.4)
MORPHOLOGY BLD-IMP: NORMAL
NEUTROPHILS # BLD AUTO: 4.89 K/UL (ref 2–7.15)
NEUTROPHILS NFR BLD: 70.8 % (ref 44–72)
NRBC # BLD AUTO: 0 K/UL
NRBC BLD-RTO: 0 /100 WBC
OVALOCYTES BLD QL SMEAR: NORMAL
PLATELET # BLD AUTO: 285 K/UL (ref 164–446)
PLATELET BLD QL SMEAR: NORMAL
PMV BLD AUTO: 9.9 FL (ref 9–12.9)
POIKILOCYTOSIS BLD QL SMEAR: NORMAL
POTASSIUM SERPL-SCNC: 4.1 MMOL/L (ref 3.6–5.5)
PROT SERPL-MCNC: 6.9 G/DL (ref 6–8.2)
RBC # BLD AUTO: 4.74 M/UL (ref 4.2–5.4)
RBC BLD AUTO: PRESENT
SODIUM SERPL-SCNC: 138 MMOL/L (ref 135–145)
WBC # BLD AUTO: 6.9 K/UL (ref 4.8–10.8)

## 2019-03-21 PROCEDURE — 85027 COMPLETE CBC AUTOMATED: CPT

## 2019-03-21 PROCEDURE — 700111 HCHG RX REV CODE 636 W/ 250 OVERRIDE (IP): Performed by: INTERNAL MEDICINE

## 2019-03-21 PROCEDURE — 99232 SBSQ HOSP IP/OBS MODERATE 35: CPT | Performed by: INTERNAL MEDICINE

## 2019-03-21 PROCEDURE — 700105 HCHG RX REV CODE 258: Performed by: INTERNAL MEDICINE

## 2019-03-21 PROCEDURE — 700102 HCHG RX REV CODE 250 W/ 637 OVERRIDE(OP): Performed by: HOSPITALIST

## 2019-03-21 PROCEDURE — 80053 COMPREHEN METABOLIC PANEL: CPT

## 2019-03-21 PROCEDURE — 85007 BL SMEAR W/DIFF WBC COUNT: CPT

## 2019-03-21 PROCEDURE — A9270 NON-COVERED ITEM OR SERVICE: HCPCS | Performed by: HOSPITALIST

## 2019-03-21 PROCEDURE — 770004 HCHG ROOM/CARE - ONCOLOGY PRIVATE *

## 2019-03-21 PROCEDURE — 97535 SELF CARE MNGMENT TRAINING: CPT

## 2019-03-21 PROCEDURE — 97530 THERAPEUTIC ACTIVITIES: CPT

## 2019-03-21 PROCEDURE — 700102 HCHG RX REV CODE 250 W/ 637 OVERRIDE(OP): Performed by: INTERNAL MEDICINE

## 2019-03-21 PROCEDURE — A9270 NON-COVERED ITEM OR SERVICE: HCPCS | Performed by: INTERNAL MEDICINE

## 2019-03-21 PROCEDURE — 97116 GAIT TRAINING THERAPY: CPT

## 2019-03-21 PROCEDURE — 36415 COLL VENOUS BLD VENIPUNCTURE: CPT

## 2019-03-21 RX ADMIN — GABAPENTIN 300 MG: 300 CAPSULE ORAL at 05:07

## 2019-03-21 RX ADMIN — ACETAMINOPHEN 650 MG: 325 TABLET, FILM COATED ORAL at 18:12

## 2019-03-21 RX ADMIN — FUROSEMIDE 40 MG: 40 TABLET ORAL at 18:03

## 2019-03-21 RX ADMIN — FUROSEMIDE 40 MG: 40 TABLET ORAL at 05:06

## 2019-03-21 RX ADMIN — CEFEPIME 2 G: 2 INJECTION, POWDER, FOR SOLUTION INTRAVENOUS at 18:06

## 2019-03-21 RX ADMIN — LINEZOLID 600 MG: 600 TABLET, FILM COATED ORAL at 05:07

## 2019-03-21 RX ADMIN — CEFEPIME 2 G: 2 INJECTION, POWDER, FOR SOLUTION INTRAVENOUS at 05:07

## 2019-03-21 RX ADMIN — GABAPENTIN 300 MG: 300 CAPSULE ORAL at 18:03

## 2019-03-21 RX ADMIN — TRAMADOL HYDROCHLORIDE 50 MG: 50 TABLET, COATED ORAL at 21:22

## 2019-03-21 RX ADMIN — POTASSIUM CHLORIDE 40 MEQ: 1500 TABLET, EXTENDED RELEASE ORAL at 18:03

## 2019-03-21 RX ADMIN — OMEPRAZOLE 20 MG: 20 CAPSULE, DELAYED RELEASE ORAL at 05:06

## 2019-03-21 RX ADMIN — LOSARTAN POTASSIUM 50 MG: 50 TABLET ORAL at 05:06

## 2019-03-21 RX ADMIN — SENNOSIDES AND DOCUSATE SODIUM 2 TABLET: 8.6; 5 TABLET ORAL at 05:07

## 2019-03-21 RX ADMIN — SODIUM CHLORIDE: 9 INJECTION, SOLUTION INTRAVENOUS at 15:18

## 2019-03-21 RX ADMIN — LINEZOLID 600 MG: 600 TABLET, FILM COATED ORAL at 18:05

## 2019-03-21 RX ADMIN — POTASSIUM CHLORIDE 40 MEQ: 1500 TABLET, EXTENDED RELEASE ORAL at 05:06

## 2019-03-21 RX ADMIN — ENOXAPARIN SODIUM 40 MG: 100 INJECTION SUBCUTANEOUS at 05:06

## 2019-03-21 ASSESSMENT — COGNITIVE AND FUNCTIONAL STATUS - GENERAL
MOBILITY SCORE: 8
MOVING TO AND FROM BED TO CHAIR: UNABLE
WALKING IN HOSPITAL ROOM: TOTAL
TURNING FROM BACK TO SIDE WHILE IN FLAT BAD: UNABLE
SUGGESTED CMS G CODE MODIFIER DAILY ACTIVITY: CK
HELP NEEDED FOR BATHING: A LOT
DRESSING REGULAR UPPER BODY CLOTHING: A LOT
TOILETING: A LOT
DRESSING REGULAR LOWER BODY CLOTHING: A LOT
STANDING UP FROM CHAIR USING ARMS: A LOT
EATING MEALS: A LITTLE
CLIMB 3 TO 5 STEPS WITH RAILING: TOTAL
MOVING FROM LYING ON BACK TO SITTING ON SIDE OF FLAT BED: A LOT
DAILY ACTIVITIY SCORE: 14
PERSONAL GROOMING: A LITTLE
SUGGESTED CMS G CODE MODIFIER MOBILITY: CM

## 2019-03-21 ASSESSMENT — ENCOUNTER SYMPTOMS
HEARTBURN: 0
MYALGIAS: 1
NERVOUS/ANXIOUS: 0
SORE THROAT: 0
BACK PAIN: 1
FEVER: 0
PHOTOPHOBIA: 0
DIZZINESS: 0
WEAKNESS: 1
HEADACHES: 0
ABDOMINAL PAIN: 0
BLURRED VISION: 0
SHORTNESS OF BREATH: 0
DEPRESSION: 0
SENSORY CHANGE: 0
VOMITING: 0
CLAUDICATION: 0
DOUBLE VISION: 0
INSOMNIA: 0
DIARRHEA: 0
COUGH: 0
NAUSEA: 0
CHILLS: 0

## 2019-03-21 ASSESSMENT — GAIT ASSESSMENTS: GAIT LEVEL OF ASSIST: UNABLE TO PARTICIPATE

## 2019-03-21 NOTE — DISCHARGE PLANNING
PT brother returned called regarding SNF placement and acceptance at [facility] in [facility]. They are agreeable to PT being discharged to this facility when ready. Many questions answered regarding transportation to the facility and the cost involved. Explained that it will depend on the transportation company used. Explained also the copay for the SNF after 20 days. Her brother will be calling the facility [facility] and getting copay amount and if their facility has any transportation assistance.  Explained again that PT will not be discharged until the antibiotics are completed which as of today is March 28.

## 2019-03-21 NOTE — PROGRESS NOTES
"[facility] Medicine Daily Progress Note    Date of Service  3/21/2019    Chief Complaint  68 y.o. female admitted 3/8/2019 with right hip pain, fever and abnormal brain mri.    Hospital Course    Patient started on empiric antibiotics given fever.  She had abnormal findings on MRI brain and CT showed \"lesion\" on right iliopsoas.  This lesion was biopsied and turned out to be abscess.  She has history of breast cancer and there is also concern of brain lesions being metastatic though their appearance is not typical of this.      Interval Problem Update  3/12 Discussed with Dr Turner for second opinion of brain lesions and based on appearance not able to r/o or r/i any diagnosis.  Given patient's presentation of confusion, hip pain and fever  - this is more supportive of infectious etiology rather than malignant.  Fluid from right gluteal area sent to micro and as of yet - no growth.  Continue zosyn for now.  3/13 Patient with slight improvement in mentation.  Blood cultures and abscess cultures are showing no growth at this time.  TTE being done while I examined patient - no evidence of vegetations on this test.  ID consultation pending - d/w Dr Garcia.  3/14 Patient feeling well today, her pain is present but not as bad as prior to admission.  She was able to follow the conversation today and is agreeable to move forward with the CHAS tomorrow.  I spoke with her brother, René, and updated him on condition yesterday afternoon also.  Will also have PICC placed in next few days as long as blood cultures remain negative as I expect a prolonged antibiotic course of treatment.  3/15 Patient without new complaints, states she needs help to move in bed.  CHAS showed no evidence of vegetations and regular diet resumed.  Culture remains negative and biopsy of brain lesion may prove needed - will watch through the weekend and if mentation does not continue to improve, will discuss with neurosurgery on Monday.  3/16 Patient states " she is okay today, mentation has waxed and waned without significant improvement.  She was febrile earlier today.  No other acute events.  3/17 Patient with significant improvement in mentation today, she is aware of her hospitalization, not falling asleep mid sentence.  Her pain mediations were held most of yesterday and likely far too strong for patient and were affecting her awareness.  Oxycodone 5 discontinued and will use tramadol instead unless pain not well controlled.  CT brain with contrast ordered as a quick scan for comparison to MRI.    3/18 Patient feeling same today, discussed need to discuss with neurosurgery for possible biopsy.  Discussed with Dr Nguyen, he reviewed MRI and CT brain and he feels they are likely metastatic lesions but are far too small to biopsy.  His recommendation is to continue with antibiotics and re-image in 2 weeks to see if any change that would be amenable to biopsy or that would further declare infection.    3/19: no acute issue overnight. Continuing IV abx to complete 2 weeks and repeat imaging after. Denies headache, chest pain, nausea, vomiting.    3/20: the patient is sleeping comfortably. No acute issue overnight.    3/21: complaint about weakness. Tried to get her up and sit on the chair.     Consultants/Specialty  ID - de    Code Status  full    Disposition  SNF    Review of Systems  Review of Systems   Constitutional: Positive for malaise/fatigue. Negative for fever.   HENT: Negative for congestion and sore throat.    Eyes: Negative for blurred vision, double vision and photophobia.   Respiratory: Negative for cough and shortness of breath.    Cardiovascular: Negative for chest pain and claudication.   Gastrointestinal: Negative for abdominal pain and heartburn.   Genitourinary: Negative for dysuria and hematuria.   Musculoskeletal: Positive for myalgias (hip pain). Negative for joint pain (left hip pain).   Skin: Negative for itching and rash.   Neurological:  Positive for weakness. Negative for dizziness and sensory change.   Psychiatric/Behavioral: Negative for depression. The patient is not nervous/anxious and does not have insomnia.         Physical Exam  Temp:  [36.7 °C (98 °F)-36.9 °C (98.5 °F)] 36.7 °C (98.1 °F)  Pulse:  [83-98] 83  Resp:  [18] 18  BP: (129-151)/(69-82) 151/82  SpO2:  [93 %-95 %] 95 %    Physical Exam   Constitutional: She is oriented to person, place, and time. She appears well-developed and well-nourished. No distress.   HENT:   Head: Normocephalic.   Eyes: Pupils are equal, round, and reactive to light. EOM are normal.   Neck: Normal range of motion. Neck supple. No JVD present.   Cardiovascular: Normal rate and regular rhythm.    Pulmonary/Chest: Effort normal and breath sounds normal. No respiratory distress.   Abdominal: Soft. Bowel sounds are normal. She exhibits no distension.   Musculoskeletal: She exhibits no edema or tenderness.   Neurological: She is alert and oriented to person, place, and time. No cranial nerve deficit.   Skin: Skin is warm and dry. She is not diaphoretic. No erythema.   Psychiatric: She has a normal mood and affect. Her behavior is normal.   Nursing note and vitals reviewed.      Fluids  No intake or output data in the 24 hours ending 03/21/19 0759    Laboratory  Recent Labs      03/21/19   0022   WBC  6.9   RBC  4.74   HEMOGLOBIN  11.6*   HEMATOCRIT  39.2   MCV  82.7   MCH  24.5*   MCHC  29.6*   RDW  53.6*   PLATELETCT  285   MPV  9.9     Recent Labs      03/19/19   0024  03/20/19   0008  03/21/19   0022   SODIUM  134*  133*  138   POTASSIUM  4.0  3.9  4.1   CHLORIDE  98  99  105   CO2  28  26  23   GLUCOSE  123*  128*  106*   BUN  28*  30*  28*   CREATININE  1.07  1.03  0.81   CALCIUM  11.3*  11.2*  11.2*                   Imaging  CT-HEAD WITH   Final Result      Multiple subcentimeter too numerous to count enhancing masses scattered throughout the supratentorial and infratentorial brain. These demonstrate some  surrounding vasogenic edema and most likely represent multifocal metastatic lesions. Other    considerations would include multifocal abscesses or septic emboli.      EC-CHAS W/O CONT   Final Result      EC-CHAS W/O CONT   Final Result      CT-NEEDLE BX-MUSCLE RIGHT   Final Result      CT-guided aspiration of pus like material in the right gluteal muscle lesion.      EC-ECHOCARDIOGRAM COMPLETE W/O CONT   Final Result      OUTSIDE IMAGES-CT CHEST/ABDOMEN/PELVIS   Final Result      OUTSIDE IMAGES-DX CHEST   Final Result      KB-[id number] FILM X-RAY   Final Result      OUTSIDE IMAGES-MR BRAIN   Final Result      OUTSIDE IMAGES-MR BRAIN   Final Result           Assessment/Plan  * Lesion of brain   Assessment & Plan    Metastatic cancer vs infectious etiology  D/w Dr Black for questionable brain mets, recommending IR Bx of psoas muscle   IR Bx of psoas muscle ordered - was abscess  Empiric treatment of infection  TTE and CHAS negative for vegetations.  D/w Dr Nguyen - lesions likely metastatic based on his interpretation of MRI/CT - too small to biopsy, recommends continuing antibiotics and re-imaging in 2 weeks to see if there has been any change.         Encephalopathy   Assessment & Plan    Likely toxic metabolic from infection  Improved       Mass of iliopsoas muscle group   Assessment & Plan    IR Bx of psoas muscle completed, fluid drained - abscess, no growth  On linezolid and cefepime and last dose 3/28/19         Normocytic anemia   Assessment & Plan    stable     RLS (restless legs syndrome)- (present on admission)   Assessment & Plan    Pramipexole       Diabetes mellitus type 2 in obese (HCC)- (present on admission)   Assessment & Plan    Well controlled   Short acting insulin as needed   Accu-Checks, hypoglycemia protocol          History of breast cancer- (present on admission)   Assessment & Plan    Mets vs infection to brain  No known active breast cancer.       COPD (chronic obstructive pulmonary  disease) (HCC)- (present on admission)   Assessment & Plan    Oxygen as needed, Respiratory protocol, Bronchodilators, Incentive spirometry      History of deep vein thrombosis- (present on admission)   Assessment & Plan    History of deep vein thrombosis   Was on Rivaroxaban as outpatient.     Held for Bx Mar 11  Restart AC once repeat CT head is done         Essential hypertension- (present on admission)   Assessment & Plan    Restarted on losartan and furosemide with hold parameters.     GERD (gastroesophageal reflux disease)- (present on admission)   Assessment & Plan    Omeprazole     Chronic pain- (present on admission)   Assessment & Plan     Multifactorial  Likely secondary to abscess, fracture  Pain control            VTE prophylaxis: scds

## 2019-03-21 NOTE — CARE PLAN
Problem: Safety  Goal: Will remain free from injury    Intervention: Provide assistance with mobility  Pt is A&Ox3, disoriented to time. Max assist to bedside commode, incontinence. Bed alarm on. Bed locked on lowest position.       Problem: Skin Integrity  Goal: Risk for impaired skin integrity will decrease    Intervention: Assess risk factors for impaired skin integrity and/or pressure ulcers  Barrier wipes in use. Pt turns self from side to side.

## 2019-03-21 NOTE — PROGRESS NOTES
Assumed care of patient at 1900. Pt is A&Ox3, disoriented to time. Pt is on 1 L O2 via NC,  in place. Max assist to bedside commode, incontinent. Bed alarm on. Denies pain or nausea. PIV patent and infusing. No further needs at this time. Call light within reach, will continue to round hourly.

## 2019-03-21 NOTE — PROGRESS NOTES
A&Ox4 with intermittent confusion. Alert & compliant today. Pt up with 1 to bedside commode, with incontinence. Pt up in chair for meals & for visit with family. Worked with PT. Pt belongings and call light within reach. Pt educated to call for assistance if needed.

## 2019-03-21 NOTE — THERAPY
"Occupational Therapy Treatment completed with focus on ADLs and ADL transfers.  Functional Status:  Mod A UB dressing, Mod A toileting, Mod A sit>stand, Max A stand pivot   Plan of Care: Will benefit from Occupational Therapy 3 times per week  Discharge Recommendations:  Equipment Will Continue to Assess for Equipment Needs. Post-acute therapy Discharge to a transitional care facility for continued skilled therapy services.    See \"Rehab Therapy-Acute\" Patient Summary Report for complete documentation.     Pt appeared to have a change of cognition from previous tx. Pt required increased processing time and max verbal and tactile cues for sequencing ADL and gross motor tasks. Pt demo'd impaired inititiation, follow through, balance, activity tolerance, and functional mobility on this date. Will continue to follow. Recommend post acute placement prior to DC home.   "

## 2019-03-21 NOTE — PROGRESS NOTES
Infectious Disease Progress Note    Author: Ashely Hinojosa M.D. Date & Time of service: 3/21/2019  11:52 AM    Chief Complaint:  Multiple brain lesions  Iliopsoas lesion    Interval History:  68-year-old female with history of diabetes and breast cancer, admitted 3/8/2019 with abdominal pain, iliopsoas lesion, and an abnormal MRI with multiple brain lesions.  3/14 AF (99.2) WBC 5.8 more awake today as compared to yesterday but not oriented-denies pain, following commands. ECHO reviewed  3/15 AF (99.1) no WBC today Had CHAS today  3/16 temp 100.5 WBC 7.9 more confused today-naked but pushing off sheets  3/17 AF WBC not done Less obtunded today-not oriented. Denies pain, N/V, HA, visual changes etc  3/18 afebrile WBC 6 patient sitting up in bed eating breakfast, she is quite soft spoken.  She denies any headache, nausea or vomiting.  Endorses a mild cough.  3/19 AF pt denies any new symptoms, no HA. Brain lesions too small for biopsy per notes. Neurosurgery recommends repeating scan in 2 weeks.  3/20 afebrile patient is sleeping and difficult to arouse  3/21 patient is sitting up in bed and is alert and awake.  She however appears to have some waxing confusion but no she is in the hospital in [place].  Complains of lower back pain but no headache, nausea or vomiting    Labs Reviewed    Review of Systems:  Review of Systems   Unable to perform ROS: Mental status change   Constitutional: Negative for chills and fever.   Gastrointestinal: Negative for abdominal pain, diarrhea, nausea and vomiting.   Musculoskeletal: Positive for back pain.   Neurological: Negative for dizziness and headaches.   Limited review of systems secondary to intermittent confusion    Hemodynamics:  Temp (24hrs), Av.7 °C (98.1 °F), Min:36.7 °C (98 °F), Max:36.8 °C (98.3 °F)  Temperature: 36.7 °C (98 °F)  Pulse  Av  Min: 70  Max: 153  Blood Pressure : 143/74       Physical Exam:  Physical Exam   Constitutional: She appears well-developed.    Elderly   HENT:   Head: Normocephalic and atraumatic.   Mouth/Throat: Oropharynx is clear and moist. No oropharyngeal exudate.   Eyes: Pupils are equal, round, and reactive to light. EOM are normal. Right eye exhibits no discharge. Left eye exhibits no discharge. No scleral icterus.   Neck: Neck supple. No JVD present.   Cardiovascular: Normal rate and intact distal pulses.    Murmur heard.  Pulmonary/Chest: Effort normal. No stridor. No respiratory distress.   Well-healed left breast mastectomy scar   Abdominal: Soft. Bowel sounds are normal. She exhibits no distension. There is no tenderness.   Musculoskeletal: She exhibits no edema, tenderness or deformity.   Lymphadenopathy:     She has no cervical adenopathy.   Neurological: She is alert.   Intermittently confused   Skin: Skin is warm. No rash noted. She is not diaphoretic.   Nursing note and vitals reviewed.      Meds:    Current Facility-Administered Medications:   •  NS  •  linezolid  •  labetalol  •  ibuprofen  •  potassium chloride SA  •  haloperidol lactate  •  cefepime  •  enoxaparin (LOVENOX) injection  •  pramipexole  •  furosemide  •  tramadol  •  losartan  •  gabapentin  •  omeprazole  •  polyethylene glycol/lytes  •  senna-docusate **AND** polyethylene glycol/lytes **AND** magnesium hydroxide **AND** bisacodyl  •  Respiratory Care per Protocol  •  Respiratory Care per Protocol  •  acetaminophen  •  Notify provider if pain remains uncontrolled **AND** Use the numeric rating scale (NRS-11) on regular floors and Critical-Care Pain Observation Tool (CPOT) on ICUs/Trauma to assess pain **AND** Pulse Ox (Oximetry) **AND** Pharmacy Consult Request **AND** If patient difficult to arouse and/or has respiratory depression, stop any opiates that are currently infusing and call a Rapid Response. **AND** oxyCODONE immediate-release **AND** [DISCONTINUED] oxyCODONE immediate-release **AND** morphine injection  •  ondansetron  •  ondansetron    Labs:  Recent  Labs      03/21/19   0022   WBC  6.9   RBC  4.74   HEMOGLOBIN  11.6*   HEMATOCRIT  39.2   MCV  82.7   MCH  24.5*   RDW  53.6*   PLATELETCT  285   MPV  9.9   NEUTSPOLYS  70.80   LYMPHOCYTES  16.80*   MONOCYTES  6.20   EOSINOPHILS  6.20   BASOPHILS  0.00   RBCMORPHOLO  Present     Recent Labs      03/19/19   0024  03/20/19   0008  03/21/19   0022   SODIUM  134*  133*  138   POTASSIUM  4.0  3.9  4.1   CHLORIDE  98  99  105   CO2  28  26  23   GLUCOSE  123*  128*  106*   BUN  28*  30*  28*     Recent Labs      03/19/19   0024  03/20/19   0008  03/21/19   0022   ALBUMIN  3.2  3.4  3.3   TBILIRUBIN  0.5  0.5  0.4   ALKPHOSPHAT  319*  275*  250*   TOTPROTEIN  7.5  7.7  6.9   ALTSGPT  20  14  11   ASTSGOT  34  28  27   CREATININE  1.07  1.03  0.81       Imaging:  Ct-needle Bx-muscle Right    Result Date: 3/13/2019  3/11/2019 4:44 PM HISTORY/REASON FOR EXAM:  Right gluteal hypodense lesion. Moderate sedation was administered with continuous monitoring of the patient under the direct supervision of  Medications: 2 mg of Versed and 200 mcg of fentanyl Total sedation time 60 minutes Procedure: After the informed consent the patient was placed on the CT table on prone position. Localization CT scan was obtained. It demonstrates hypodense area in the right gluteus muscle. Subsequently a 17-gauge needle was advanced into the lesion. 20  mL of pus was aspirated. The materials were sent to the pathology. The patient tolerated the procedure without any immediate competition.     CT-guided aspiration of pus like material in the right gluteal muscle lesion.    Ec-echocardiogram Complete W/o Cont    Result Date: 3/13/2019  Transthoracic Echo Report Echocardiography Laboratory CONCLUSIONS No prior study is available for comparison. Normal left ventricular systolic function. Left ventricular ejection fraction is visually estimated to be 65%. Normal diastolic function. Normal inferior vena cava size and inspiratory collapse.  "Aortic sclerosis without stenosis. Estimated right ventricular systolic pressure  is 33 mmHg. No obvious valvular vegetations are noted on a decent quality study.  If further valvular assessment is clinically indicated, consider transesophageal echocardiogram. SAM,  LV EF:  65    % FINDINGS Left Ventricle Normal left ventricular size and systolic function. Normal left ventricular wall thickness. Left ventricular ejection fraction is visually estimated to be 65%. Normal diastolic function. Right Ventricle Normal right ventricular size and systolic function. Right Atrium Normal right atrial size. Normal inferior vena cava size and inspiratory collapse. Left Atrium Normal left atrial size. Mitral Valve Structurally normal mitral valve without significant stenosis or regurgitation. Aortic Valve Aortic sclerosis without stenosis. No aortic insufficiency. Tricuspid Valve Structurally normal tricuspid valve. Mild tricuspid regurgitation. Right atrial pressure is estimated to be 3 mmHg. Estimated right ventricular systolic pressure  is 33 mmHg. Pulmonic Valve The pulmonic valve is not well visualized. No pulmonic insufficiency. Pericardium Normal pericardium without effusion. Aorta The aortic root is normal.  Ascending aorta diameter is 3.0 cm. Claire Tripathi MD (Electronically Signed) Final Date:     13 March 2019                 14:38      Micro:  Results     Procedure Component Value Units Date/Time    BLOOD CULTURE [[id number]] Collected:  03/12/19 1826    Order Status:  Completed Specimen:  Blood from Peripheral Updated:  03/17/19 2100     Significant Indicator NEG     Source BLD     Site PERIPHERAL     Blood Culture No growth after 5 days of incubation.    Narrative:       Per Hospital Policy: Only change Specimen Src: to \"Line\" if  specified by physician order.    BLOOD CULTURE [[id number]] Collected:  03/12/19 1826    Order Status:  Completed Specimen:  Blood from Peripheral Updated:  03/17/19 2100     Significant " "Indicator NEG     Source BLD     Site PERIPHERAL     Blood Culture No growth after 5 days of incubation.    Narrative:       Per Hospital Policy: Only change Specimen Src: to \"Line\" if  specified by physician order.    CULTURE WOUND W/ GRAM STAIN [[id number]] Collected:  03/11/19 1800    Order Status:  Completed Specimen:  Wound Updated:  03/15/19 0743     Significant Indicator NEG     Source WND     Site Right Gluteal Muscles     Culture Result Wound No growth at 4 days     Gram Stain Result Moderate WBCs.  No organisms seen.            Assessment:  Active Hospital Problems    Diagnosis   • *Lesion of brain [G93.9]   • Mass of iliopsoas muscle group [M62.89]   • Encephalopathy [G93.40]   • Hyponatremia [E87.1]   • History of breast cancer [Z85.3]   • Diabetes mellitus type 2 in obese (HCC) [E11.69, E66.9]       Plan:  Multiple brain lesions  Low-grade fevers resolved  No current leukocytosis  Persistent AMS-waxing and waning, but improved  MRI with scattered small lesions incl aaron  TTE neg; CHAS neg  Bcxs neg to date  Brain lesions too small to biopsy   Dr. Nguyen of neurosurgery recommends repeating imaging in 2 weeks  Plan for repeat imaging in 2 weeks    Iliopsoas mass, on treatment  CT + 2.6 cm lesion in the right iliopsoas region as well as a 2.8x2.1 cm lesion in the right gluteal musculature   S/p aspiration +WBCs-cultures negative to date  Bcxs remain neg  Discontinued Vanco secondary to elevated Vanco troughs and patient not clearing well  Continue cefepime and linezolid  Anticipate 14 day course  Stop date 3/28/19  Monitor CBC while on linezolid    H/o breast cancer  Markedly elevated alk phos  Recommend continued diaz for mets    Type 2 diabetes mellitus  Hemoglobin A1c 6.3  Keep BS under 150 to help control current infection    I have performed a physical exam and reviewed and updated ROS and plan today 3/21/2019.  In review of yesterday's note 3/20/2019, there are no changes except as documented " above.    Disposition: SNF in [place].  However patient will complete her course of antibiotics in the hospital prior to transfer.  Recommend that she has her repeat imaging done prior to transfer as well

## 2019-03-22 ENCOUNTER — APPOINTMENT (OUTPATIENT)
Dept: RADIOLOGY | Facility: MEDICAL CENTER | Age: 69
DRG: 981 | End: 2019-03-22
Attending: INTERNAL MEDICINE
Payer: MEDICARE

## 2019-03-22 PROCEDURE — 700111 HCHG RX REV CODE 636 W/ 250 OVERRIDE (IP): Performed by: INTERNAL MEDICINE

## 2019-03-22 PROCEDURE — A9270 NON-COVERED ITEM OR SERVICE: HCPCS | Performed by: HOSPITALIST

## 2019-03-22 PROCEDURE — 99232 SBSQ HOSP IP/OBS MODERATE 35: CPT | Performed by: INTERNAL MEDICINE

## 2019-03-22 PROCEDURE — 770004 HCHG ROOM/CARE - ONCOLOGY PRIVATE *

## 2019-03-22 PROCEDURE — 700102 HCHG RX REV CODE 250 W/ 637 OVERRIDE(OP): Performed by: INTERNAL MEDICINE

## 2019-03-22 PROCEDURE — 700105 HCHG RX REV CODE 258: Performed by: INTERNAL MEDICINE

## 2019-03-22 PROCEDURE — 700102 HCHG RX REV CODE 250 W/ 637 OVERRIDE(OP): Performed by: HOSPITALIST

## 2019-03-22 PROCEDURE — 76937 US GUIDE VASCULAR ACCESS: CPT

## 2019-03-22 PROCEDURE — A9270 NON-COVERED ITEM OR SERVICE: HCPCS | Performed by: INTERNAL MEDICINE

## 2019-03-22 RX ORDER — NYSTATIN 100000 [USP'U]/G
POWDER TOPICAL 2 TIMES DAILY
Status: DISCONTINUED | OUTPATIENT
Start: 2019-03-22 | End: 2019-04-12

## 2019-03-22 RX ADMIN — TRAMADOL HYDROCHLORIDE 50 MG: 50 TABLET, COATED ORAL at 08:35

## 2019-03-22 RX ADMIN — NYSTATIN: 100000 POWDER TOPICAL at 18:19

## 2019-03-22 RX ADMIN — ACETAMINOPHEN 650 MG: 325 TABLET, FILM COATED ORAL at 18:18

## 2019-03-22 RX ADMIN — GABAPENTIN 300 MG: 300 CAPSULE ORAL at 05:17

## 2019-03-22 RX ADMIN — ENOXAPARIN SODIUM 40 MG: 100 INJECTION SUBCUTANEOUS at 05:17

## 2019-03-22 RX ADMIN — LABETALOL HCL 100 MG: 200 TABLET, FILM COATED ORAL at 09:11

## 2019-03-22 RX ADMIN — TRAMADOL HYDROCHLORIDE 50 MG: 50 TABLET, COATED ORAL at 20:02

## 2019-03-22 RX ADMIN — LOSARTAN POTASSIUM 50 MG: 50 TABLET ORAL at 05:17

## 2019-03-22 RX ADMIN — SODIUM CHLORIDE: 9 INJECTION, SOLUTION INTRAVENOUS at 14:35

## 2019-03-22 RX ADMIN — FUROSEMIDE 40 MG: 40 TABLET ORAL at 18:19

## 2019-03-22 RX ADMIN — FUROSEMIDE 40 MG: 40 TABLET ORAL at 05:17

## 2019-03-22 RX ADMIN — PRAMIPEXOLE DIHYDROCHLORIDE 0.25 MG: 0.25 TABLET ORAL at 20:02

## 2019-03-22 RX ADMIN — CEFEPIME 2 G: 2 INJECTION, POWDER, FOR SOLUTION INTRAVENOUS at 05:17

## 2019-03-22 RX ADMIN — POTASSIUM CHLORIDE 40 MEQ: 1500 TABLET, EXTENDED RELEASE ORAL at 05:17

## 2019-03-22 RX ADMIN — SODIUM CHLORIDE: 9 INJECTION, SOLUTION INTRAVENOUS at 01:02

## 2019-03-22 RX ADMIN — CEFEPIME 2 G: 2 INJECTION, POWDER, FOR SOLUTION INTRAVENOUS at 18:16

## 2019-03-22 RX ADMIN — LINEZOLID 600 MG: 600 TABLET, FILM COATED ORAL at 05:24

## 2019-03-22 RX ADMIN — PRAMIPEXOLE DIHYDROCHLORIDE 0.25 MG: 0.25 TABLET ORAL at 01:00

## 2019-03-22 RX ADMIN — GABAPENTIN 300 MG: 300 CAPSULE ORAL at 18:19

## 2019-03-22 RX ADMIN — OMEPRAZOLE 20 MG: 20 CAPSULE, DELAYED RELEASE ORAL at 05:17

## 2019-03-22 RX ADMIN — LINEZOLID 600 MG: 600 TABLET, FILM COATED ORAL at 18:31

## 2019-03-22 RX ADMIN — POTASSIUM CHLORIDE 40 MEQ: 1500 TABLET, EXTENDED RELEASE ORAL at 18:16

## 2019-03-22 ASSESSMENT — ENCOUNTER SYMPTOMS
BLURRED VISION: 0
FEVER: 0
SHORTNESS OF BREATH: 0
DOUBLE VISION: 0
NERVOUS/ANXIOUS: 0
SORE THROAT: 0
ABDOMINAL PAIN: 0
WEAKNESS: 1
HEARTBURN: 0
SENSORY CHANGE: 0
NAUSEA: 0
HEADACHES: 0
CHILLS: 0
BACK PAIN: 1
VOMITING: 0
COUGH: 0
DIZZINESS: 0
DEPRESSION: 0
MYALGIAS: 1
DIARRHEA: 0

## 2019-03-22 NOTE — CARE PLAN
Problem: Safety  Goal: Will remain free from injury    Intervention: Provide assistance with mobility  Pt is A&ox4 with intermittent confusion. Max assist to bedside commode, incontinence. Bed alarm on.       Problem: Pain Management  Goal: Pain level will decrease to patient's comfort goal    Intervention: Follow pain managment plan developed in collaboration with patient and Interdisciplinary Team  Pt reports pain on 0-10 scale, medicated per MAR.

## 2019-03-22 NOTE — PROGRESS NOTES
· 2 RN skin check complete with PATRICK Burns.  · Devices in place: oxygen and glasses .  · Skin assessed under devices.  · Blanching redness to top of ears bilaterally, excoriation and rash to buttocks and coccyx.  The following interventions in place: Q 2 turns in place, moisture barrier lotion in use, silicone O 2 tubing in use, incontinence care frequently.

## 2019-03-22 NOTE — PROGRESS NOTES
BP (!) 166/92   Pulse 89   Temp 36.7 °C (98.1 °F) (Oral)   Resp 18   Ht 1.524 m (5')   Wt 62.9 kg (138 lb 10.7 oz)   SpO2 98%   Breastfeeding? No   BMI 27.08 kg/m²   Received report and assumed care of pt at 0700. Pt is A&O x4. Pt denies nausea or SOB. Pt mobility assessed and pt is a max assist. Pt complains of pain 9/10 in her back. Tramadol given for pain. Pt anxious. New PIV patent and infusing. Bed is in lowest position and call light is within reach. Hourly rounding is in place.

## 2019-03-22 NOTE — PROGRESS NOTES
Infectious Disease Progress Note    Author: Ashely Hinojosa M.D. Date & Time of service: 3/22/2019  10:32 AM    Chief Complaint:  Multiple brain lesions  Iliopsoas lesion    Interval History:  68-year-old female with history of diabetes and breast cancer, admitted 3/8/2019 with abdominal pain, iliopsoas lesion, and an abnormal MRI with multiple brain lesions.  3/14 AF (99.2) WBC 5.8 more awake today as compared to yesterday but not oriented-denies pain, following commands. ECHO reviewed  3/15 AF (99.1) no WBC today Had CHAS today  3/16 temp 100.5 WBC 7.9 more confused today-naked but pushing off sheets  3/17 AF WBC not done Less obtunded today-not oriented. Denies pain, N/V, HA, visual changes etc  3/18 afebrile WBC 6 patient sitting up in bed eating breakfast, she is quite soft spoken.  She denies any headache, nausea or vomiting.  Endorses a mild cough.  3/19 AF pt denies any new symptoms, no HA. Brain lesions too small for biopsy per notes. Neurosurgery recommends repeating scan in 2 weeks.  3/20 afebrile patient is sleeping and difficult to arouse  3/21 patient is sitting up in bed and is alert and awake.  She however appears to have some waxing confusion but no she is in the hospital in [place].  Complains of lower back pain but no headache, nausea or vomiting  3/22 AF no CBC, continues to have bowel incontinence but not watery per RN, pt has no new complaints today, not engaged in conversation    Labs Reviewed    Review of Systems:  Review of Systems   Unable to perform ROS: Mental status change   Constitutional: Negative for chills and fever.   Gastrointestinal: Negative for abdominal pain, diarrhea, nausea and vomiting.   Musculoskeletal: Positive for back pain.   Neurological: Negative for dizziness and headaches.   Limited review of systems secondary to intermittent confusion    Hemodynamics:  Temp (24hrs), Av.6 °C (97.8 °F), Min:36.3 °C (97.4 °F), Max:36.7 °C (98.1 °F)  Temperature: 36.7 °C (98.1  °F)  Pulse  Av.5  Min: 70  Max: 153  Blood Pressure : (!) 166/92       Physical Exam:  Physical Exam   Constitutional: She appears well-developed.   Elderly   HENT:   Head: Normocephalic and atraumatic.   Mouth/Throat: Oropharynx is clear and moist. No oropharyngeal exudate.   Eyes: Pupils are equal, round, and reactive to light. EOM are normal. Right eye exhibits no discharge. Left eye exhibits no discharge. No scleral icterus.   Neck: Neck supple. No JVD present.   Cardiovascular: Normal rate and intact distal pulses.    Murmur heard.  Pulmonary/Chest: Effort normal. No stridor. No respiratory distress.   Well-healed left breast mastectomy scar   Abdominal: Soft. Bowel sounds are normal. She exhibits no distension. There is no tenderness.   Musculoskeletal: She exhibits no edema, tenderness or deformity.   Lymphadenopathy:     She has no cervical adenopathy.   Neurological: She is alert.   Intermittently confused and slow to respond to questioning   Skin: Skin is warm. No rash noted. She is not diaphoretic.   Nursing note and vitals reviewed.      Meds:    Current Facility-Administered Medications:   •  NS  •  linezolid  •  labetalol  •  ibuprofen  •  potassium chloride SA  •  haloperidol lactate  •  cefepime  •  enoxaparin (LOVENOX) injection  •  pramipexole  •  furosemide  •  tramadol  •  losartan  •  gabapentin  •  omeprazole  •  polyethylene glycol/lytes  •  senna-docusate **AND** polyethylene glycol/lytes **AND** magnesium hydroxide **AND** bisacodyl  •  Respiratory Care per Protocol  •  Respiratory Care per Protocol  •  acetaminophen  •  Notify provider if pain remains uncontrolled **AND** Use the numeric rating scale (NRS-11) on regular floors and Critical-Care Pain Observation Tool (CPOT) on ICUs/Trauma to assess pain **AND** Pulse Ox (Oximetry) **AND** Pharmacy Consult Request **AND** If patient difficult to arouse and/or has respiratory depression, stop any opiates that are currently infusing and  call a Rapid Response. **AND** oxyCODONE immediate-release **AND** [DISCONTINUED] oxyCODONE immediate-release **AND** morphine injection  •  ondansetron  •  ondansetron    Labs:  Recent Labs      03/21/19   0022   WBC  6.9   RBC  4.74   HEMOGLOBIN  11.6*   HEMATOCRIT  39.2   MCV  82.7   MCH  24.5*   RDW  53.6*   PLATELETCT  285   MPV  9.9   NEUTSPOLYS  70.80   LYMPHOCYTES  16.80*   MONOCYTES  6.20   EOSINOPHILS  6.20   BASOPHILS  0.00   RBCMORPHOLO  Present     Recent Labs      03/20/19 0008  03/21/19   0022   SODIUM  133*  138   POTASSIUM  3.9  4.1   CHLORIDE  99  105   CO2  26  23   GLUCOSE  128*  106*   BUN  30*  28*     Recent Labs      03/20/19 0008 03/21/19   0022   ALBUMIN  3.4  3.3   TBILIRUBIN  0.5  0.4   ALKPHOSPHAT  275*  250*   TOTPROTEIN  7.7  6.9   ALTSGPT  14  11   ASTSGOT  28  27   CREATININE  1.03  0.81       Imaging:  Ct-needle Bx-muscle Right    Result Date: 3/13/2019  3/11/2019 4:44 PM HISTORY/REASON FOR EXAM:  Right gluteal hypodense lesion. Moderate sedation was administered with continuous monitoring of the patient under the direct supervision of  Medications: 2 mg of Versed and 200 mcg of fentanyl Total sedation time 60 minutes Procedure: After the informed consent the patient was placed on the CT table on prone position. Localization CT scan was obtained. It demonstrates hypodense area in the right gluteus muscle. Subsequently a 17-gauge needle was advanced into the lesion. 20  mL of pus was aspirated. The materials were sent to the pathology. The patient tolerated the procedure without any immediate competition.     CT-guided aspiration of pus like material in the right gluteal muscle lesion.    Ec-echocardiogram Complete W/o Cont    Result Date: 3/13/2019  Transthoracic Echo Report Echocardiography Laboratory CONCLUSIONS No prior study is available for comparison. Normal left ventricular systolic function. Left ventricular ejection fraction is visually estimated to be  "65%. Normal diastolic function. Normal inferior vena cava size and inspiratory collapse. Aortic sclerosis without stenosis. Estimated right ventricular systolic pressure  is 33 mmHg. No obvious valvular vegetations are noted on a decent quality study.  If further valvular assessment is clinically indicated, consider transesophageal echocardiogram. SAM,  LV EF:  65    % FINDINGS Left Ventricle Normal left ventricular size and systolic function. Normal left ventricular wall thickness. Left ventricular ejection fraction is visually estimated to be 65%. Normal diastolic function. Right Ventricle Normal right ventricular size and systolic function. Right Atrium Normal right atrial size. Normal inferior vena cava size and inspiratory collapse. Left Atrium Normal left atrial size. Mitral Valve Structurally normal mitral valve without significant stenosis or regurgitation. Aortic Valve Aortic sclerosis without stenosis. No aortic insufficiency. Tricuspid Valve Structurally normal tricuspid valve. Mild tricuspid regurgitation. Right atrial pressure is estimated to be 3 mmHg. Estimated right ventricular systolic pressure  is 33 mmHg. Pulmonic Valve The pulmonic valve is not well visualized. No pulmonic insufficiency. Pericardium Normal pericardium without effusion. Aorta The aortic root is normal.  Ascending aorta diameter is 3.0 cm. Claire Tripathi MD (Electronically Signed) Final Date:     13 March 2019                 14:38      Micro:  Results     Procedure Component Value Units Date/Time    BLOOD CULTURE [[id number]] Collected:  03/12/19 1826    Order Status:  Completed Specimen:  Blood from Peripheral Updated:  03/17/19 2100     Significant Indicator NEG     Source BLD     Site PERIPHERAL     Blood Culture No growth after 5 days of incubation.    Narrative:       Per Hospital Policy: Only change Specimen Src: to \"Line\" if  specified by physician order.    BLOOD CULTURE [[id number]] Collected:  03/12/19 1826    Order " "Status:  Completed Specimen:  Blood from Peripheral Updated:  03/17/19 2100     Significant Indicator NEG     Source BLD     Site PERIPHERAL     Blood Culture No growth after 5 days of incubation.    Narrative:       Per Hospital Policy: Only change Specimen Src: to \"Line\" if  specified by physician order.          Assessment:  Active Hospital Problems    Diagnosis   • *Lesion of brain [G93.9]   • Mass of iliopsoas muscle group [M62.89]   • Encephalopathy [G93.40]   • Hyponatremia [E87.1]   • History of breast cancer [Z85.3]   • Diabetes mellitus type 2 in obese (HCC) [E11.69, E66.9]       Plan:  Multiple brain lesions  Low-grade fevers resolved  No current leukocytosis  Persistent AMS-waxing and waning, but improved  MRI with scattered small lesions incl aaron  TTE neg; CHAS neg  Bcxs neg to date  Brain lesions too small to biopsy   Dr. Nguyen of neurosurgery recommends repeating imaging in 2 weeks  Repeat MRI prior to completion of abx    Iliopsoas mass, on treatment  CT + 2.6 cm lesion in the right iliopsoas region as well as a 2.8x2.1 cm lesion in the right gluteal musculature   S/p aspiration +WBCs-cultures negative to date  Bcxs remain neg  Discontinued Vanco secondary to elevated Vanco troughs and patient not clearing well  Continue cefepime and linezolid  Anticipate 14 day course  Stop date 3/28/19  Monitor CBC while on linezolid  Midline ordered    H/o breast cancer  Markedly elevated alk phos  Recommend continued diaz for mets    Type 2 diabetes mellitus  Hemoglobin A1c 6.3  Keep BS under 150 to help control current infection    I have performed a physical exam and reviewed and updated ROS and plan today 3/22/2019.  In review of yesterday's note 3/21/2019, there are no changes except as documented above.    Disposition: SNF in [place].  However patient will complete her course of antibiotics in the hospital prior to transfer.  Recommend that she has her repeat imaging done prior to transfer as well    DW " Dr. Ramírez

## 2019-03-22 NOTE — PROGRESS NOTES
Vascular Access Team    Date of Insertion: 3/22/19  Arm Circumference: 26.5  Internal length: 13  External Length: 0  Vein Occupancy %: 45  Reason for Midline: antibiotics  Labs: WBC 6.9, , BUN 28, Cr 0.81, GFR >60, INR n/a  Orders confirmed, vessel patency confirmed with ultrasound. Risks and benefits of procedure explained to patient and education regarding line associated bloodstream infections provided. Questions answered.     Power Midline placed in RUE per MD order with ultrasound guidance, initial arm circumference 26.5cm. 4 Fr, single lumen Power Midline placed in brachial vein after 3 attempt(s). 3.0 cc's of 1% lidocaine injected intradermally, 21 gauge microintroducer needle and modified Seldinger technique used. 13 cm catheter inserted with good blood return. Secured at 0 cm marker. Each lumen flushed without resistance with 10 mL 0.9% normal saline. Midline secured with Biopatch and Tegaderm.     Midline placement is confirmed by nurse using ultrasound and ability to flush and draw blood. Midline is appropriate for use at this time.  No X-ray is needed for placement confirmation. Pt tolerated procedure well.  Patient condition relayed to unit RN or ordering physician via this post procedure note in the EMR.     Ultrasound images uploaded to PACS and viewable in the EMR - yes  Ultrasound imaged printed and placed in paper chart - no      BARD Power Midline ref # [id number], Lot # [id number]

## 2019-03-22 NOTE — PROGRESS NOTES
"[facility] Medicine Daily Progress Note    Date of Service  3/22/2019    Chief Complaint  68 y.o. female admitted 3/8/2019 with right hip pain, fever and abnormal brain mri.    Hospital Course    Patient started on empiric antibiotics given fever.  She had abnormal findings on MRI brain and CT showed \"lesion\" on right iliopsoas.  This lesion was biopsied and turned out to be abscess.  She has history of breast cancer and there is also concern of brain lesions being metastatic though their appearance is not typical of this.      Interval Problem Update  3/12 Discussed with Dr Turner for second opinion of brain lesions and based on appearance not able to r/o or r/i any diagnosis.  Given patient's presentation of confusion, hip pain and fever  - this is more supportive of infectious etiology rather than malignant.  Fluid from right gluteal area sent to micro and as of yet - no growth.  Continue zosyn for now.  3/13 Patient with slight improvement in mentation.  Blood cultures and abscess cultures are showing no growth at this time.  TTE being done while I examined patient - no evidence of vegetations on this test.  ID consultation pending - d/w Dr Garcia.  3/14 Patient feeling well today, her pain is present but not as bad as prior to admission.  She was able to follow the conversation today and is agreeable to move forward with the CHAS tomorrow.  I spoke with her brother, René, and updated him on condition yesterday afternoon also.  Will also have PICC placed in next few days as long as blood cultures remain negative as I expect a prolonged antibiotic course of treatment.  3/15 Patient without new complaints, states she needs help to move in bed.  CHAS showed no evidence of vegetations and regular diet resumed.  Culture remains negative and biopsy of brain lesion may prove needed - will watch through the weekend and if mentation does not continue to improve, will discuss with neurosurgery on Monday.  3/16 Patient states " she is okay today, mentation has waxed and waned without significant improvement.  She was febrile earlier today.  No other acute events.  3/17 Patient with significant improvement in mentation today, she is aware of her hospitalization, not falling asleep mid sentence.  Her pain mediations were held most of yesterday and likely far too strong for patient and were affecting her awareness.  Oxycodone 5 discontinued and will use tramadol instead unless pain not well controlled.  CT brain with contrast ordered as a quick scan for comparison to MRI.    3/18 Patient feeling same today, discussed need to discuss with neurosurgery for possible biopsy.  Discussed with Dr Nguyen, he reviewed MRI and CT brain and he feels they are likely metastatic lesions but are far too small to biopsy.  His recommendation is to continue with antibiotics and re-image in 2 weeks to see if any change that would be amenable to biopsy or that would further declare infection.    3/19: no acute issue overnight. Continuing IV abx to complete 2 weeks and repeat imaging after. Denies headache, chest pain, nausea, vomiting.    3/20: the patient is sleeping comfortably. No acute issue overnight.    3/21: complaint about weakness. Tried to get her up and sit on the chair.     3/22: her IV line blew again today. Will order midline for her today. No acute event. Eating breakfast.    Consultants/Specialty  ID - de    Code Status  full    Disposition  SNF    Review of Systems  Review of Systems   Constitutional: Positive for malaise/fatigue. Negative for fever.   HENT: Negative for congestion and sore throat.    Eyes: Negative for blurred vision and double vision.   Respiratory: Negative for cough and shortness of breath.    Cardiovascular: Negative for chest pain.   Gastrointestinal: Negative for heartburn.   Genitourinary: Negative for dysuria.   Musculoskeletal: Positive for myalgias (hip pain). Negative for joint pain (left hip pain).   Skin:  Negative for itching and rash.   Neurological: Positive for weakness. Negative for dizziness and sensory change.   Psychiatric/Behavioral: Negative for depression. The patient is not nervous/anxious.         Physical Exam  Temp:  [36.3 °C (97.4 °F)-36.7 °C (98 °F)] 36.7 °C (98 °F)  Pulse:  [91-98] 93  Resp:  [18] 18  BP: (143-155)/(62-79) 153/62  SpO2:  [94 %-97 %] 94 %    Physical Exam   Constitutional: She is oriented to person, place, and time. She appears well-developed and well-nourished. No distress.   HENT:   Head: Normocephalic.   Eyes: Pupils are equal, round, and reactive to light.   Neck: Normal range of motion. No JVD present.   Cardiovascular: Normal rate and regular rhythm.    Pulmonary/Chest: Effort normal and breath sounds normal. No respiratory distress.   Abdominal: Soft. She exhibits no distension.   Musculoskeletal: She exhibits no edema or tenderness.   Neurological: She is alert and oriented to person, place, and time. No cranial nerve deficit.   Skin: Skin is warm and dry. She is not diaphoretic. No erythema.   Psychiatric: She has a normal mood and affect.   Nursing note and vitals reviewed.      Fluids    Intake/Output Summary (Last 24 hours) at 03/22/19 0737  Last data filed at 03/21/19 2122   Gross per 24 hour   Intake              370 ml   Output              300 ml   Net               70 ml       Laboratory  Recent Labs      03/21/19   0022   WBC  6.9   RBC  4.74   HEMOGLOBIN  11.6*   HEMATOCRIT  39.2   MCV  82.7   MCH  24.5*   MCHC  29.6*   RDW  53.6*   PLATELETCT  285   MPV  9.9     Recent Labs      03/20/19   0008  03/21/19   0022   SODIUM  133*  138   POTASSIUM  3.9  4.1   CHLORIDE  99  105   CO2  26  23   GLUCOSE  128*  106*   BUN  30*  28*   CREATININE  1.03  0.81   CALCIUM  11.2*  11.2*                   Imaging  CT-HEAD WITH   Final Result      Multiple subcentimeter too numerous to count enhancing masses scattered throughout the supratentorial and infratentorial brain. These  demonstrate some surrounding vasogenic edema and most likely represent multifocal metastatic lesions. Other    considerations would include multifocal abscesses or septic emboli.      EC-CHAS W/O CONT   Final Result      EC-CHAS W/O CONT   Final Result      CT-NEEDLE BX-MUSCLE RIGHT   Final Result      CT-guided aspiration of pus like material in the right gluteal muscle lesion.      EC-ECHOCARDIOGRAM COMPLETE W/O CONT   Final Result      OUTSIDE IMAGES-CT CHEST/ABDOMEN/PELVIS   Final Result      OUTSIDE IMAGES-DX CHEST   Final Result      DQ-TWAPOUD-HASJPXF FILM X-RAY   Final Result      OUTSIDE IMAGES-MR BRAIN   Final Result      OUTSIDE IMAGES-MR BRAIN   Final Result           Assessment/Plan  * Lesion of brain   Assessment & Plan    Metastatic cancer vs infectious etiology  D/w Dr Black for questionable brain mets, recommending IR Bx of psoas muscle   IR Bx of psoas muscle ordered - was abscess  Empiric treatment of infection  TTE and CHAS negative for vegetations.  D/w Dr Nguyen - lesions likely metastatic based on his interpretation of MRI/CT - too small to biopsy, recommends continuing antibiotics and re-imaging in 2 weeks to see if there has been any change.  MRI brain once she completes abx          Encephalopathy   Assessment & Plan    Likely toxic metabolic from infection  Improved       Mass of iliopsoas muscle group   Assessment & Plan    IR Bx of psoas muscle completed, fluid drained - abscess, no growth  On linezolid and cefepime and last dose 3/28/19         Normocytic anemia   Assessment & Plan    stable     RLS (restless legs syndrome)- (present on admission)   Assessment & Plan    Pramipexole       Diabetes mellitus type 2 in obese (HCC)- (present on admission)   Assessment & Plan    Well controlled   Short acting insulin as needed   Accu-Checks, hypoglycemia protocol          History of breast cancer- (present on admission)   Assessment & Plan    Mets vs infection to brain  No known active breast  cancer.       COPD (chronic obstructive pulmonary disease) (HCC)- (present on admission)   Assessment & Plan    Oxygen as needed, Respiratory protocol, Bronchodilators, Incentive spirometry      History of deep vein thrombosis- (present on admission)   Assessment & Plan    History of deep vein thrombosis   Was on Rivaroxaban as outpatient.     Held for Bx Mar 11  Restart AC once repeat CT head is done         Essential hypertension- (present on admission)   Assessment & Plan    Restarted on losartan and furosemide with hold parameters.     GERD (gastroesophageal reflux disease)- (present on admission)   Assessment & Plan    Omeprazole     Chronic pain- (present on admission)   Assessment & Plan     Multifactorial  Likely secondary to abscess, fracture  Pain control            VTE prophylaxis: scds

## 2019-03-22 NOTE — PROGRESS NOTES
Assumed care of patient at 1900. Pt is A&Ox4 with intermittent confusion, Pt is max assist to bedside commode, incontinence. Bed alarm on. Reports 9/10 pain, medicated per MAR. Denies nausea. L PIV infiltrated, placed new PIV in the R forearm. No further needs at this time. Call light within reach, will continue to round hourly.

## 2019-03-23 LAB
ALBUMIN SERPL BCP-MCNC: 3.1 G/DL (ref 3.2–4.9)
ALBUMIN/GLOB SERPL: 0.9 G/DL
ALP SERPL-CCNC: 211 U/L (ref 30–99)
ALT SERPL-CCNC: 10 U/L (ref 2–50)
ANION GAP SERPL CALC-SCNC: 8 MMOL/L (ref 0–11.9)
APPEARANCE UR: ABNORMAL
AST SERPL-CCNC: 23 U/L (ref 12–45)
BACTERIA #/AREA URNS HPF: NEGATIVE /HPF
BASOPHILS # BLD AUTO: 0.5 % (ref 0–1.8)
BASOPHILS # BLD: 0.03 K/UL (ref 0–0.12)
BILIRUB SERPL-MCNC: 0.4 MG/DL (ref 0.1–1.5)
BILIRUB UR QL STRIP.AUTO: NEGATIVE
BUN SERPL-MCNC: 31 MG/DL (ref 8–22)
CALCIUM SERPL-MCNC: 10.7 MG/DL (ref 8.5–10.5)
CHLORIDE SERPL-SCNC: 102 MMOL/L (ref 96–112)
CO2 SERPL-SCNC: 24 MMOL/L (ref 20–33)
COLOR UR: YELLOW
CREAT SERPL-MCNC: 0.91 MG/DL (ref 0.5–1.4)
EOSINOPHIL # BLD AUTO: 0.43 K/UL (ref 0–0.51)
EOSINOPHIL NFR BLD: 6.9 % (ref 0–6.9)
EPI CELLS #/AREA URNS HPF: ABNORMAL /HPF
ERYTHROCYTE [DISTWIDTH] IN BLOOD BY AUTOMATED COUNT: 53.1 FL (ref 35.9–50)
GLOBULIN SER CALC-MCNC: 3.6 G/DL (ref 1.9–3.5)
GLUCOSE SERPL-MCNC: 110 MG/DL (ref 65–99)
GLUCOSE UR STRIP.AUTO-MCNC: NEGATIVE MG/DL
HCT VFR BLD AUTO: 36 % (ref 37–47)
HGB BLD-MCNC: 11 G/DL (ref 12–16)
HYALINE CASTS #/AREA URNS LPF: ABNORMAL /LPF
IMM GRANULOCYTES # BLD AUTO: 0.05 K/UL (ref 0–0.11)
IMM GRANULOCYTES NFR BLD AUTO: 0.8 % (ref 0–0.9)
KETONES UR STRIP.AUTO-MCNC: NEGATIVE MG/DL
LEUKOCYTE ESTERASE UR QL STRIP.AUTO: ABNORMAL
LYMPHOCYTES # BLD AUTO: 1.17 K/UL (ref 1–4.8)
LYMPHOCYTES NFR BLD: 18.7 % (ref 22–41)
MCH RBC QN AUTO: 25.1 PG (ref 27–33)
MCHC RBC AUTO-ENTMCNC: 30.6 G/DL (ref 33.6–35)
MCV RBC AUTO: 82.2 FL (ref 81.4–97.8)
MICRO URNS: ABNORMAL
MONOCYTES # BLD AUTO: 0.66 K/UL (ref 0–0.85)
MONOCYTES NFR BLD AUTO: 10.5 % (ref 0–13.4)
NEUTROPHILS # BLD AUTO: 3.92 K/UL (ref 2–7.15)
NEUTROPHILS NFR BLD: 62.6 % (ref 44–72)
NITRITE UR QL STRIP.AUTO: NEGATIVE
NRBC # BLD AUTO: 0 K/UL
NRBC BLD-RTO: 0 /100 WBC
PH UR STRIP.AUTO: 6 [PH]
PLATELET # BLD AUTO: 230 K/UL (ref 164–446)
PMV BLD AUTO: 9.9 FL (ref 9–12.9)
POTASSIUM SERPL-SCNC: 4.1 MMOL/L (ref 3.6–5.5)
PROT SERPL-MCNC: 6.7 G/DL (ref 6–8.2)
PROT UR QL STRIP: 30 MG/DL
RBC # BLD AUTO: 4.38 M/UL (ref 4.2–5.4)
RBC # URNS HPF: ABNORMAL /HPF
RBC UR QL AUTO: ABNORMAL
SODIUM SERPL-SCNC: 134 MMOL/L (ref 135–145)
SP GR UR STRIP.AUTO: 1.01
UROBILINOGEN UR STRIP.AUTO-MCNC: 0.2 MG/DL
WBC # BLD AUTO: 6.3 K/UL (ref 4.8–10.8)
WBC #/AREA URNS HPF: ABNORMAL /HPF

## 2019-03-23 PROCEDURE — A9270 NON-COVERED ITEM OR SERVICE: HCPCS | Performed by: HOSPITALIST

## 2019-03-23 PROCEDURE — 700102 HCHG RX REV CODE 250 W/ 637 OVERRIDE(OP): Performed by: INTERNAL MEDICINE

## 2019-03-23 PROCEDURE — 36415 COLL VENOUS BLD VENIPUNCTURE: CPT

## 2019-03-23 PROCEDURE — 770004 HCHG ROOM/CARE - ONCOLOGY PRIVATE *

## 2019-03-23 PROCEDURE — 85025 COMPLETE CBC W/AUTO DIFF WBC: CPT

## 2019-03-23 PROCEDURE — 700111 HCHG RX REV CODE 636 W/ 250 OVERRIDE (IP): Performed by: INTERNAL MEDICINE

## 2019-03-23 PROCEDURE — 700102 HCHG RX REV CODE 250 W/ 637 OVERRIDE(OP): Performed by: HOSPITALIST

## 2019-03-23 PROCEDURE — 700105 HCHG RX REV CODE 258: Performed by: INTERNAL MEDICINE

## 2019-03-23 PROCEDURE — A9270 NON-COVERED ITEM OR SERVICE: HCPCS | Performed by: INTERNAL MEDICINE

## 2019-03-23 PROCEDURE — 80053 COMPREHEN METABOLIC PANEL: CPT

## 2019-03-23 PROCEDURE — 81001 URINALYSIS AUTO W/SCOPE: CPT

## 2019-03-23 PROCEDURE — 99232 SBSQ HOSP IP/OBS MODERATE 35: CPT | Performed by: INTERNAL MEDICINE

## 2019-03-23 RX ADMIN — CEFEPIME 2 G: 2 INJECTION, POWDER, FOR SOLUTION INTRAVENOUS at 17:36

## 2019-03-23 RX ADMIN — LINEZOLID 600 MG: 600 TABLET, FILM COATED ORAL at 06:15

## 2019-03-23 RX ADMIN — NYSTATIN: 100000 POWDER TOPICAL at 19:21

## 2019-03-23 RX ADMIN — POTASSIUM CHLORIDE 40 MEQ: 1500 TABLET, EXTENDED RELEASE ORAL at 17:38

## 2019-03-23 RX ADMIN — CEFEPIME 2 G: 2 INJECTION, POWDER, FOR SOLUTION INTRAVENOUS at 06:14

## 2019-03-23 RX ADMIN — POTASSIUM CHLORIDE 40 MEQ: 1500 TABLET, EXTENDED RELEASE ORAL at 06:14

## 2019-03-23 RX ADMIN — TRAMADOL HYDROCHLORIDE 50 MG: 50 TABLET, COATED ORAL at 14:10

## 2019-03-23 RX ADMIN — NYSTATIN: 100000 POWDER TOPICAL at 06:15

## 2019-03-23 RX ADMIN — GABAPENTIN 300 MG: 300 CAPSULE ORAL at 06:15

## 2019-03-23 RX ADMIN — ENOXAPARIN SODIUM 40 MG: 100 INJECTION SUBCUTANEOUS at 06:15

## 2019-03-23 RX ADMIN — PRAMIPEXOLE DIHYDROCHLORIDE 0.25 MG: 0.25 TABLET ORAL at 17:40

## 2019-03-23 RX ADMIN — SODIUM CHLORIDE: 9 INJECTION, SOLUTION INTRAVENOUS at 10:06

## 2019-03-23 RX ADMIN — OMEPRAZOLE 20 MG: 20 CAPSULE, DELAYED RELEASE ORAL at 06:14

## 2019-03-23 RX ADMIN — GABAPENTIN 300 MG: 300 CAPSULE ORAL at 17:37

## 2019-03-23 RX ADMIN — LINEZOLID 600 MG: 600 TABLET, FILM COATED ORAL at 17:36

## 2019-03-23 RX ADMIN — FUROSEMIDE 40 MG: 40 TABLET ORAL at 06:15

## 2019-03-23 RX ADMIN — LOSARTAN POTASSIUM 50 MG: 50 TABLET ORAL at 06:15

## 2019-03-23 ASSESSMENT — ENCOUNTER SYMPTOMS
FEVER: 0
DIARRHEA: 1
BACK PAIN: 1
HEARTBURN: 0
DIZZINESS: 0
CHILLS: 0
WEAKNESS: 1
BLURRED VISION: 0
COUGH: 0
NAUSEA: 0
HEADACHES: 0
ABDOMINAL PAIN: 0
VOMITING: 0
MYALGIAS: 1
DEPRESSION: 0
SORE THROAT: 0

## 2019-03-23 NOTE — PROGRESS NOTES
Infectious Disease Progress Note    Author: Ashely Hinojosa M.D. Date & Time of service: 3/23/2019  9:57 AM    Chief Complaint:  Multiple brain lesions  Iliopsoas lesion    Interval History:  68-year-old female with history of diabetes and breast cancer, admitted 3/8/2019 with abdominal pain, iliopsoas lesion, and an abnormal MRI with multiple brain lesions.  3/14 AF (99.2) WBC 5.8 more awake today as compared to yesterday but not oriented-denies pain, following commands. ECHO reviewed  3/15 AF (99.1) no WBC today Had CHAS today  3/16 temp 100.5 WBC 7.9 more confused today-naked but pushing off sheets  3/17 AF WBC not done Less obtunded today-not oriented. Denies pain, N/V, HA, visual changes etc  3/18 afebrile WBC 6 patient sitting up in bed eating breakfast, she is quite soft spoken.  She denies any headache, nausea or vomiting.  Endorses a mild cough.  3/19 AF pt denies any new symptoms, no HA. Brain lesions too small for biopsy per notes. Neurosurgery recommends repeating scan in 2 weeks.  3/20 afebrile patient is sleeping and difficult to arouse  3/21 patient is sitting up in bed and is alert and awake.  She however appears to have some waxing confusion but no she is in the hospital in [place].  Complains of lower back pain but no headache, nausea or vomiting  3/22 AF no CBC, continues to have bowel incontinence but not watery per RN, pt has no new complaints today, not engaged in conversation  3/23 afebrile WBC 6.3 patient complaining of stool incontinence and diarrhea, midline placed yesterday    Labs Reviewed    Review of Systems:  Review of Systems   Constitutional: Negative for chills and fever.   Gastrointestinal: Positive for diarrhea. Negative for abdominal pain, nausea and vomiting.   Musculoskeletal: Positive for back pain.   Neurological: Negative for dizziness and headaches.   Limited review of systems secondary to intermittent confusion    Hemodynamics:  Temp (24hrs), Av.8 °C (98.2 °F), Min:36.6  °C (97.8 °F), Max:37.1 °C (98.7 °F)  Temperature: 36.9 °C (98.5 °F)  Pulse  Av.5  Min: 70  Max: 153  Blood Pressure : 133/65       Physical Exam:  Physical Exam   Constitutional: She appears well-developed.   Elderly   HENT:   Head: Normocephalic and atraumatic.   Mouth/Throat: Oropharynx is clear and moist. No oropharyngeal exudate.   Eyes: Pupils are equal, round, and reactive to light. EOM are normal. Right eye exhibits no discharge. Left eye exhibits no discharge. No scleral icterus.   Neck: Neck supple. No JVD present.   Cardiovascular: Normal rate and intact distal pulses.    Murmur heard.  Pulmonary/Chest: Effort normal. No stridor. No respiratory distress.   Well-healed left breast mastectomy scar   Abdominal: Soft. Bowel sounds are normal. She exhibits no distension. There is no tenderness.   Musculoskeletal: She exhibits no edema, tenderness or deformity.   Midline   Lymphadenopathy:     She has no cervical adenopathy.   Neurological: She is alert.   Intermittently confused and slow to respond to questioning  Answering questions appropriately today   Skin: Skin is warm. No rash noted. She is not diaphoretic.   Nursing note and vitals reviewed.      Meds:    Current Facility-Administered Medications:   •  nystatin  •  NS  •  linezolid  •  labetalol  •  ibuprofen  •  potassium chloride SA  •  haloperidol lactate  •  cefepime  •  enoxaparin (LOVENOX) injection  •  pramipexole  •  furosemide  •  tramadol  •  losartan  •  gabapentin  •  omeprazole  •  polyethylene glycol/lytes  •  senna-docusate **AND** polyethylene glycol/lytes **AND** magnesium hydroxide **AND** bisacodyl  •  Respiratory Care per Protocol  •  Respiratory Care per Protocol  •  acetaminophen  •  Notify provider if pain remains uncontrolled **AND** Use the numeric rating scale (NRS-11) on regular floors and Critical-Care Pain Observation Tool (CPOT) on ICUs/Trauma to assess pain **AND** Pulse Ox (Oximetry) **AND** Pharmacy Consult Request  **AND** If patient difficult to arouse and/or has respiratory depression, stop any opiates that are currently infusing and call a Rapid Response. **AND** oxyCODONE immediate-release **AND** [DISCONTINUED] oxyCODONE immediate-release **AND** morphine injection  •  ondansetron  •  ondansetron    Labs:  Recent Labs      03/21/19 0022  03/23/19   0022   WBC  6.9  6.3   RBC  4.74  4.38   HEMOGLOBIN  11.6*  11.0*   HEMATOCRIT  39.2  36.0*   MCV  82.7  82.2   MCH  24.5*  25.1*   RDW  53.6*  53.1*   PLATELETCT  285  230   MPV  9.9  9.9   NEUTSPOLYS  70.80  62.60   LYMPHOCYTES  16.80*  18.70*   MONOCYTES  6.20  10.50   EOSINOPHILS  6.20  6.90   BASOPHILS  0.00  0.50   RBCMORPHOLO  Present   --      Recent Labs      03/21/19 0022 03/23/19   0022   SODIUM  138  134*   POTASSIUM  4.1  4.1   CHLORIDE  105  102   CO2  23  24   GLUCOSE  106*  110*   BUN  28*  31*     Recent Labs      03/21/19 0022  03/23/19   0022   ALBUMIN  3.3  3.1*   TBILIRUBIN  0.4  0.4   ALKPHOSPHAT  250*  211*   TOTPROTEIN  6.9  6.7   ALTSGPT  11  10   ASTSGOT  27  23   CREATININE  0.81  0.91       Imaging:  Ct-needle Bx-muscle Right    Result Date: 3/13/2019  3/11/2019 4:44 PM HISTORY/REASON FOR EXAM:  Right gluteal hypodense lesion. Moderate sedation was administered with continuous monitoring of the patient under the direct supervision of  Medications: 2 mg of Versed and 200 mcg of fentanyl Total sedation time 60 minutes Procedure: After the informed consent the patient was placed on the CT table on prone position. Localization CT scan was obtained. It demonstrates hypodense area in the right gluteus muscle. Subsequently a 17-gauge needle was advanced into the lesion. 20  mL of pus was aspirated. The materials were sent to the pathology. The patient tolerated the procedure without any immediate competition.     CT-guided aspiration of pus like material in the right gluteal muscle lesion.    Ec-echocardiogram Complete W/o Cont    Result  Date: 3/13/2019  Transthoracic Echo Report Echocardiography Laboratory CONCLUSIONS No prior study is available for comparison. Normal left ventricular systolic function. Left ventricular ejection fraction is visually estimated to be 65%. Normal diastolic function. Normal inferior vena cava size and inspiratory collapse. Aortic sclerosis without stenosis. Estimated right ventricular systolic pressure  is 33 mmHg. No obvious valvular vegetations are noted on a decent quality study.  If further valvular assessment is clinically indicated, consider transesophageal echocardiogram. SAM,  LV EF:  65    % FINDINGS Left Ventricle Normal left ventricular size and systolic function. Normal left ventricular wall thickness. Left ventricular ejection fraction is visually estimated to be 65%. Normal diastolic function. Right Ventricle Normal right ventricular size and systolic function. Right Atrium Normal right atrial size. Normal inferior vena cava size and inspiratory collapse. Left Atrium Normal left atrial size. Mitral Valve Structurally normal mitral valve without significant stenosis or regurgitation. Aortic Valve Aortic sclerosis without stenosis. No aortic insufficiency. Tricuspid Valve Structurally normal tricuspid valve. Mild tricuspid regurgitation. Right atrial pressure is estimated to be 3 mmHg. Estimated right ventricular systolic pressure  is 33 mmHg. Pulmonic Valve The pulmonic valve is not well visualized. No pulmonic insufficiency. Pericardium Normal pericardium without effusion. Aorta The aortic root is normal.  Ascending aorta diameter is 3.0 cm. Claire Tripathi MD (Electronically Signed) Final Date:     13 March 2019                 14:38      Micro:  Results     Procedure Component Value Units Date/Time    BLOOD CULTURE [[id number]] Collected:  03/12/19 1826    Order Status:  Completed Specimen:  Blood from Peripheral Updated:  03/17/19 2100     Significant Indicator NEG     Source BLD     Site PERIPHERAL  "    Blood Culture No growth after 5 days of incubation.    Narrative:       Per Hospital Policy: Only change Specimen Src: to \"Line\" if  specified by physician order.    BLOOD CULTURE [[id number]] Collected:  03/12/19 1826    Order Status:  Completed Specimen:  Blood from Peripheral Updated:  03/17/19 2100     Significant Indicator NEG     Source BLD     Site PERIPHERAL     Blood Culture No growth after 5 days of incubation.    Narrative:       Per Hospital Policy: Only change Specimen Src: to \"Line\" if  specified by physician order.          Assessment:  Active Hospital Problems    Diagnosis   • *Lesion of brain [G93.9]   • Mass of iliopsoas muscle group [M62.89]   • Encephalopathy [G93.40]   • Hyponatremia [E87.1]   • History of breast cancer [Z85.3]   • Diabetes mellitus type 2 in obese (HCC) [E11.69, E66.9]       Plan:  Multiple brain lesions  Low-grade fevers resolved  No current leukocytosis  Persistent AMS-waxing and waning, but improved  MRI with scattered small lesions incl aaron  TTE neg; CHAS neg  Bcxs neg to date  Brain lesions too small to biopsy   Dr. Nguyen of neurosurgery recommends repeating imaging in 2 weeks  Repeat MRI prior to completion of abx    Iliopsoas mass, on treatment  CT + 2.6 cm lesion in the right iliopsoas region as well as a 2.8x2.1 cm lesion in the right gluteal musculature   S/p aspiration +WBCs-cultures negative to date  Bcxs remain neg  Discontinued Vanco secondary to elevated Vanco troughs and patient not clearing well  Continue cefepime and linezolid  Anticipate 14 day course  Stop date 3/28/19  Monitor CBC while on linezolid  Midline ordered    Diarrhea, persistent  DC stool softeners today  If continues or worsens, check C. difficile PCR    H/o breast cancer  Markedly elevated alk phos  Recommend continued diaz for mets    Type 2 diabetes mellitus  Hemoglobin A1c 6.3  Keep BS under 150 to help control current infection    I have performed a physical exam and reviewed and updated " ROS and plan today 3/23/2019.  In review of yesterday's note 3/22/2019, there are no changes except as documented above.    Disposition: SNF in [place].  However patient will complete her course of antibiotics in the hospital prior to transfer.  Recommend she has repeat imaging done prior to transfer as well    DW Dr. Ramírez

## 2019-03-23 NOTE — PROGRESS NOTES
"[facility] Medicine Daily Progress Note    Date of Service  3/23/2019    Chief Complaint  68 y.o. female admitted 3/8/2019 with right hip pain, fever and abnormal brain mri.    Hospital Course    Patient started on empiric antibiotics given fever.  She had abnormal findings on MRI brain and CT showed \"lesion\" on right iliopsoas.  This lesion was biopsied and turned out to be abscess.  She has history of breast cancer and there is also concern of brain lesions being metastatic though their appearance is not typical of this.      Interval Problem Update  3/12 Discussed with Dr Turner for second opinion of brain lesions and based on appearance not able to r/o or r/i any diagnosis.  Given patient's presentation of confusion, hip pain and fever  - this is more supportive of infectious etiology rather than malignant.  Fluid from right gluteal area sent to micro and as of yet - no growth.  Continue zosyn for now.  3/13 Patient with slight improvement in mentation.  Blood cultures and abscess cultures are showing no growth at this time.  TTE being done while I examined patient - no evidence of vegetations on this test.  ID consultation pending - d/w Dr Garcia.  3/14 Patient feeling well today, her pain is present but not as bad as prior to admission.  She was able to follow the conversation today and is agreeable to move forward with the CHAS tomorrow.  I spoke with her brother, René, and updated him on condition yesterday afternoon also.  Will also have PICC placed in next few days as long as blood cultures remain negative as I expect a prolonged antibiotic course of treatment.  3/15 Patient without new complaints, states she needs help to move in bed.  CHAS showed no evidence of vegetations and regular diet resumed.  Culture remains negative and biopsy of brain lesion may prove needed - will watch through the weekend and if mentation does not continue to improve, will discuss with neurosurgery on Monday.  3/16 Patient states " she is okay today, mentation has waxed and waned without significant improvement.  She was febrile earlier today.  No other acute events.  3/17 Patient with significant improvement in mentation today, she is aware of her hospitalization, not falling asleep mid sentence.  Her pain mediations were held most of yesterday and likely far too strong for patient and were affecting her awareness.  Oxycodone 5 discontinued and will use tramadol instead unless pain not well controlled.  CT brain with contrast ordered as a quick scan for comparison to MRI.    3/18 Patient feeling same today, discussed need to discuss with neurosurgery for possible biopsy.  Discussed with Dr Nguyen, he reviewed MRI and CT brain and he feels they are likely metastatic lesions but are far too small to biopsy.  His recommendation is to continue with antibiotics and re-image in 2 weeks to see if any change that would be amenable to biopsy or that would further declare infection.    3/19: no acute issue overnight. Continuing IV abx to complete 2 weeks and repeat imaging after. Denies headache, chest pain, nausea, vomiting.    3/20: the patient is sleeping comfortably. No acute issue overnight.    3/21: complaint about weakness. Tried to get her up and sit on the chair.     3/22: her IV line blew again today. Will order midline for her today. No acute event. Eating breakfast.    3/23: no acute issue overnight. Just woke up this am. Had MID line placed yeseterday.    Consultants/Specialty  ID - de    Code Status  full    Disposition  SNF    Review of Systems  Review of Systems   Constitutional: Positive for malaise/fatigue. Negative for fever.   HENT: Negative for congestion and sore throat.    Eyes: Negative for blurred vision.   Respiratory: Negative for cough.    Cardiovascular: Negative for chest pain.   Gastrointestinal: Negative for heartburn.   Genitourinary: Negative for dysuria.   Musculoskeletal: Positive for myalgias (hip pain). Joint  pain: left hip pain.   Skin: Negative for itching and rash.   Neurological: Positive for weakness. Negative for dizziness.   Psychiatric/Behavioral: Negative for depression.        Physical Exam  Temp:  [36.6 °C (97.8 °F)-37.1 °C (98.7 °F)] 37.1 °C (98.7 °F)  Pulse:  [78-89] 88  Resp:  [16-18] 16  BP: (146-166)/(69-92) 165/81  SpO2:  [93 %-98 %] 93 %    Physical Exam   Constitutional: She is oriented to person, place, and time. She appears well-developed and well-nourished. No distress.   HENT:   Head: Normocephalic.   Eyes: Pupils are equal, round, and reactive to light. Conjunctivae are normal.   Neck: Normal range of motion.   Cardiovascular: Normal rate and regular rhythm.    Pulmonary/Chest: Effort normal and breath sounds normal. No respiratory distress. She has no wheezes.   Abdominal: Soft. Bowel sounds are normal. She exhibits no distension.   Musculoskeletal: She exhibits no edema or tenderness.   Neurological: She is alert and oriented to person, place, and time. No cranial nerve deficit.   Skin: Skin is warm and dry. She is not diaphoretic. No erythema.   Psychiatric: She has a normal mood and affect.   Nursing note and vitals reviewed.      Fluids    Intake/Output Summary (Last 24 hours) at 03/23/19 0724  Last data filed at 03/22/19 1700   Gross per 24 hour   Intake             1280 ml   Output                1 ml   Net             1279 ml       Laboratory  Recent Labs      03/21/19 0022  03/23/19   0022   WBC  6.9  6.3   RBC  4.74  4.38   HEMOGLOBIN  11.6*  11.0*   HEMATOCRIT  39.2  36.0*   MCV  82.7  82.2   MCH  24.5*  25.1*   MCHC  29.6*  30.6*   RDW  53.6*  53.1*   PLATELETCT  285  230   MPV  9.9  9.9     Recent Labs      03/21/19 0022  03/23/19   0022   SODIUM  138  134*   POTASSIUM  4.1  4.1   CHLORIDE  105  102   CO2  23  24   GLUCOSE  106*  110*   BUN  28*  31*   CREATININE  0.81  0.91   CALCIUM  11.2*  10.7*                   Imaging  IR-MIDLINE CATHETER INSERTION WO GUIDANCE > AGE 3    Final Result                  Ultrasound-guided midline placement performed by qualified nursing staff    as above.          CT-HEAD WITH   Final Result      Multiple subcentimeter too numerous to count enhancing masses scattered throughout the supratentorial and infratentorial brain. These demonstrate some surrounding vasogenic edema and most likely represent multifocal metastatic lesions. Other    considerations would include multifocal abscesses or septic emboli.      EC-CHAS W/O CONT   Final Result      EC-CHAS W/O CONT   Final Result      CT-NEEDLE BX-MUSCLE RIGHT   Final Result      CT-guided aspiration of pus like material in the right gluteal muscle lesion.      EC-ECHOCARDIOGRAM COMPLETE W/O CONT   Final Result      OUTSIDE IMAGES-CT CHEST/ABDOMEN/PELVIS   Final Result      OUTSIDE IMAGES-DX CHEST   Final Result      WZ-FUXSITH-KQHZNWE FILM X-RAY   Final Result      OUTSIDE IMAGES-MR BRAIN   Final Result      OUTSIDE IMAGES-MR BRAIN   Final Result           Assessment/Plan  * Lesion of brain   Assessment & Plan    Metastatic cancer vs infectious etiology  D/w Dr Black for questionable brain mets, recommending IR Bx of psoas muscle   IR Bx of psoas muscle ordered - was abscess  Empiric treatment of infection  TTE and CHAS negative for vegetations.  D/w Dr Nguyen - lesions likely metastatic based on his interpretation of MRI/CT - too small to biopsy, recommends continuing antibiotics and re-imaging in 2 weeks to see if there has been any change.  MRI brain once she completes abx on 3/28         Encephalopathy   Assessment & Plan    Likely toxic metabolic from infection  Improved       Mass of iliopsoas muscle group   Assessment & Plan    IR Bx of psoas muscle completed, fluid drained - abscess, no growth  On linezolid and cefepime and last dose 3/28/19  ID on         Normocytic anemia   Assessment & Plan    stable     RLS (restless legs syndrome)- (present on admission)   Assessment & Plan    Pramipexole        Diabetes mellitus type 2 in obese (HCC)- (present on admission)   Assessment & Plan    Well controlled   Short acting insulin as needed   Accu-Checks, hypoglycemia protocol          History of breast cancer- (present on admission)   Assessment & Plan    Mets vs infection to brain  No known active breast cancer.       COPD (chronic obstructive pulmonary disease) (HCC)- (present on admission)   Assessment & Plan    Oxygen as needed, Respiratory protocol, Bronchodilators, Incentive spirometry      History of deep vein thrombosis- (present on admission)   Assessment & Plan    History of deep vein thrombosis   Was on Rivaroxaban as outpatient.     Held for Bx Mar 11  Restart AC once repeat CT head is done         Essential hypertension- (present on admission)   Assessment & Plan    Restarted on losartan and furosemide with hold parameters.     GERD (gastroesophageal reflux disease)- (present on admission)   Assessment & Plan    Omeprazole     Chronic pain- (present on admission)   Assessment & Plan     Multifactorial  Likely secondary to abscess, fracture  Pain control            VTE prophylaxis: scds

## 2019-03-23 NOTE — CARE PLAN
Problem: Safety  Goal: Will remain free from falls  Outcome: PROGRESSING AS EXPECTED  Bed alarm on    Problem: Infection  Goal: Will remain free from infection  Outcome: PROGRESSING SLOWER THAN EXPECTED  Pt is afebrile

## 2019-03-23 NOTE — CARE PLAN
Problem: Safety  Goal: Will remain free from falls    Intervention: Implement fall precautions  Bed alarm on, treaded socks in place, bed locked in lowest position, room close to nursing station, call light within reach.       Problem: Pain Management  Goal: Pain level will decrease to patient's comfort goal    Intervention: Follow pain managment plan developed in collaboration with patient and Interdisciplinary Team  Pt reports pain using 0-10 scale, medicated per MAR.

## 2019-03-23 NOTE — CARE PLAN
Problem: Safety  Goal: Will remain free from falls  Outcome: PROGRESSING AS EXPECTED  Bed alarm is on. Pt educated to call before getting up.    Problem: Infection  Goal: Will remain free from infection  Outcome: PROGRESSING AS EXPECTED  Pt is afebrile

## 2019-03-23 NOTE — PROGRESS NOTES
/65   Pulse 89   Temp 36.9 °C (98.5 °F) (Temporal)   Resp 16   Ht 1.524 m (5')   Wt 62.9 kg (138 lb 10.7 oz)   SpO2 98%   Breastfeeding? No   BMI 27.08 kg/m²   Received report and assumed care of pt at 0700. Pt is A&O x3. Pt is disoriented to time. Pt denies pain, SOB, or nausea. Pt is up max assist. Bed alarm is on and pt educated to call before getting up. Pt is anxious. POC discussed and all questions answered at this time. Bed is in lowest position and call light is within reach. Hourly rounding is in place.

## 2019-03-23 NOTE — PROGRESS NOTES
Assumed care of patient at 1900. Pt is A&Ox3, disoriented to time. Max assist to bedside commode, incontinence. Bed alarm on, treaded socks in place. R upper midline patent. Pt reports 7/10 pain, medicated per MAR. Denies nausea. No further needs at this time. Call light within reach, will continue to round hourly.

## 2019-03-24 PROBLEM — B35.6 FUNGAL INFECTION OF THE GROIN: Status: ACTIVE | Noted: 2019-03-24

## 2019-03-24 PROBLEM — N39.0 URINARY TRACT INFECTION: Status: ACTIVE | Noted: 2019-03-24

## 2019-03-24 PROCEDURE — 700105 HCHG RX REV CODE 258: Performed by: INTERNAL MEDICINE

## 2019-03-24 PROCEDURE — 700111 HCHG RX REV CODE 636 W/ 250 OVERRIDE (IP): Performed by: INTERNAL MEDICINE

## 2019-03-24 PROCEDURE — A9270 NON-COVERED ITEM OR SERVICE: HCPCS | Performed by: INTERNAL MEDICINE

## 2019-03-24 PROCEDURE — A9270 NON-COVERED ITEM OR SERVICE: HCPCS | Performed by: HOSPITALIST

## 2019-03-24 PROCEDURE — 700102 HCHG RX REV CODE 250 W/ 637 OVERRIDE(OP): Performed by: INTERNAL MEDICINE

## 2019-03-24 PROCEDURE — 700102 HCHG RX REV CODE 250 W/ 637 OVERRIDE(OP): Performed by: HOSPITALIST

## 2019-03-24 PROCEDURE — 99232 SBSQ HOSP IP/OBS MODERATE 35: CPT | Performed by: INTERNAL MEDICINE

## 2019-03-24 PROCEDURE — 302118 SHAMPOO,NO RINSE: Performed by: INTERNAL MEDICINE

## 2019-03-24 PROCEDURE — 770004 HCHG ROOM/CARE - ONCOLOGY PRIVATE *

## 2019-03-24 RX ADMIN — POTASSIUM CHLORIDE 40 MEQ: 1500 TABLET, EXTENDED RELEASE ORAL at 05:17

## 2019-03-24 RX ADMIN — CEFEPIME 2 G: 2 INJECTION, POWDER, FOR SOLUTION INTRAVENOUS at 17:34

## 2019-03-24 RX ADMIN — TRAMADOL HYDROCHLORIDE 50 MG: 50 TABLET, COATED ORAL at 05:16

## 2019-03-24 RX ADMIN — NYSTATIN: 100000 POWDER TOPICAL at 05:16

## 2019-03-24 RX ADMIN — GABAPENTIN 300 MG: 300 CAPSULE ORAL at 05:17

## 2019-03-24 RX ADMIN — LOSARTAN POTASSIUM 50 MG: 50 TABLET ORAL at 05:16

## 2019-03-24 RX ADMIN — OMEPRAZOLE 20 MG: 20 CAPSULE, DELAYED RELEASE ORAL at 05:17

## 2019-03-24 RX ADMIN — SODIUM CHLORIDE: 9 INJECTION, SOLUTION INTRAVENOUS at 13:06

## 2019-03-24 RX ADMIN — CEFEPIME 2 G: 2 INJECTION, POWDER, FOR SOLUTION INTRAVENOUS at 05:15

## 2019-03-24 RX ADMIN — LINEZOLID 600 MG: 600 TABLET, FILM COATED ORAL at 05:16

## 2019-03-24 RX ADMIN — FUROSEMIDE 40 MG: 40 TABLET ORAL at 05:17

## 2019-03-24 RX ADMIN — NYSTATIN: 100000 POWDER TOPICAL at 17:33

## 2019-03-24 RX ADMIN — IBUPROFEN 600 MG: 400 TABLET, FILM COATED ORAL at 07:24

## 2019-03-24 RX ADMIN — ENOXAPARIN SODIUM 40 MG: 100 INJECTION SUBCUTANEOUS at 05:16

## 2019-03-24 RX ADMIN — POTASSIUM CHLORIDE 40 MEQ: 1500 TABLET, EXTENDED RELEASE ORAL at 17:34

## 2019-03-24 RX ADMIN — PRAMIPEXOLE DIHYDROCHLORIDE 0.25 MG: 0.25 TABLET ORAL at 17:33

## 2019-03-24 RX ADMIN — GABAPENTIN 300 MG: 300 CAPSULE ORAL at 17:34

## 2019-03-24 RX ADMIN — TRAMADOL HYDROCHLORIDE 50 MG: 50 TABLET, COATED ORAL at 16:35

## 2019-03-24 ASSESSMENT — ENCOUNTER SYMPTOMS
WEAKNESS: 1
DIARRHEA: 1
DEPRESSION: 0
COUGH: 0
DOUBLE VISION: 0
HEADACHES: 0
NAUSEA: 0
BACK PAIN: 1
MYALGIAS: 1
DIZZINESS: 0
ABDOMINAL PAIN: 0
BLURRED VISION: 0
FEVER: 0
VOMITING: 0
HEARTBURN: 0
SORE THROAT: 0
PHOTOPHOBIA: 0
CHILLS: 0

## 2019-03-24 NOTE — PROGRESS NOTES
· 2 RN skin check complete.  · Devices in place: oxygen and glasses .  · Skin assessed under devices.  · Blanching redness to top of ears bilaterally, excoriation and rash to buttocks and coccyx.  · The following interventions in place: Q2 turns in place, moisture barrier lotion in use/barrier cream, silicone O2 tubing in use, pt incontinent - frequent care, nystatin powder

## 2019-03-24 NOTE — PROGRESS NOTES
A/ox3- disoriented to time, bed alarm in place.  R midline running NS @75mL/hr.  Up with 2 person assist to bedside commode.   Has Tramadol PRN for pain.   Rounding in place, all needs met at this time.

## 2019-03-24 NOTE — PROGRESS NOTES
Infectious Disease Progress Note    Author: Ashely Hinojosa M.D. Date & Time of service: 3/24/2019  10:24 AM    Chief Complaint:  Multiple brain lesions  Iliopsoas lesion    Interval History:  68-year-old female with history of diabetes and breast cancer, admitted 3/8/2019 with abdominal pain, iliopsoas lesion, and an abnormal MRI with multiple brain lesions.  3/14 AF (99.2) WBC 5.8 more awake today as compared to yesterday but not oriented-denies pain, following commands. ECHO reviewed  3/15 AF (99.1) no WBC today Had CHAS today  3/16 temp 100.5 WBC 7.9 more confused today-naked but pushing off sheets  3/17 AF WBC not done Less obtunded today-not oriented. Denies pain, N/V, HA, visual changes etc  3/18 afebrile WBC 6 patient sitting up in bed eating breakfast, she is quite soft spoken.  She denies any headache, nausea or vomiting.  Endorses a mild cough.  3/19 AF pt denies any new symptoms, no HA. Brain lesions too small for biopsy per notes. Neurosurgery recommends repeating scan in 2 weeks.  3/20 afebrile patient is sleeping and difficult to arouse  3/21 patient is sitting up in bed and is alert and awake.  She however appears to have some waxing confusion but no she is in the hospital in [place].  Complains of lower back pain but no headache, nausea or vomiting  3/22 AF no CBC, continues to have bowel incontinence but not watery per RN, pt has no new complaints today, not engaged in conversation  3/23 afebrile WBC 6.3 patient complaining of stool incontinence and diarrhea, midline placed yesterday  3/24 afebrile patient rested comfortably without any new complaints    Labs Reviewed    Review of Systems:  Review of Systems   Constitutional: Negative for chills and fever.   Gastrointestinal: Positive for diarrhea. Negative for abdominal pain, nausea and vomiting.   Musculoskeletal: Positive for back pain.   Neurological: Negative for dizziness and headaches.   Limited review of systems secondary to intermittent  confusion    Hemodynamics:  Temp (24hrs), Av.9 °C (98.5 °F), Min:36.7 °C (98.1 °F), Max:37.2 °C (98.9 °F)  Temperature: 36.7 °C (98.1 °F)  Pulse  Av.3  Min: 70  Max: 153  Blood Pressure : 121/51       Physical Exam:  Physical Exam   Constitutional: She appears well-developed.   Elderly   HENT:   Head: Normocephalic and atraumatic.   Mouth/Throat: Oropharynx is clear and moist. No oropharyngeal exudate.   Eyes: Pupils are equal, round, and reactive to light. EOM are normal. Right eye exhibits no discharge. Left eye exhibits no discharge. No scleral icterus.   Neck: Neck supple. No JVD present.   Cardiovascular: Normal rate and intact distal pulses.    Murmur heard.  Pulmonary/Chest: Effort normal. No stridor. No respiratory distress.   Well-healed left breast mastectomy scar   Abdominal: Soft. Bowel sounds are normal. She exhibits no distension. There is no tenderness.   Musculoskeletal: She exhibits no edema, tenderness or deformity.   Midline   Lymphadenopathy:     She has no cervical adenopathy.   Neurological: She is alert.   Intermittently confused   Skin: Skin is warm. No rash noted. She is not diaphoretic.   Nursing note and vitals reviewed.      Meds:    Current Facility-Administered Medications:   •  nystatin  •  NS  •  linezolid  •  labetalol  •  ibuprofen  •  potassium chloride SA  •  haloperidol lactate  •  cefepime  •  enoxaparin (LOVENOX) injection  •  pramipexole  •  furosemide  •  tramadol  •  losartan  •  gabapentin  •  omeprazole  •  Respiratory Care per Protocol  •  Respiratory Care per Protocol  •  acetaminophen  •  Notify provider if pain remains uncontrolled **AND** Use the numeric rating scale (NRS-11) on regular floors and Critical-Care Pain Observation Tool (CPOT) on ICUs/Trauma to assess pain **AND** Pulse Ox (Oximetry) **AND** Pharmacy Consult Request **AND** If patient difficult to arouse and/or has respiratory depression, stop any opiates that are currently infusing and call a  Rapid Response. **AND** oxyCODONE immediate-release **AND** [DISCONTINUED] oxyCODONE immediate-release **AND** morphine injection  •  ondansetron  •  ondansetron    Labs:  Recent Labs      03/23/19 0022   WBC  6.3   RBC  4.38   HEMOGLOBIN  11.0*   HEMATOCRIT  36.0*   MCV  82.2   MCH  25.1*   RDW  53.1*   PLATELETCT  230   MPV  9.9   NEUTSPOLYS  62.60   LYMPHOCYTES  18.70*   MONOCYTES  10.50   EOSINOPHILS  6.90   BASOPHILS  0.50     Recent Labs      03/23/19   0022   SODIUM  134*   POTASSIUM  4.1   CHLORIDE  102   CO2  24   GLUCOSE  110*   BUN  31*     Recent Labs      03/23/19 0022   ALBUMIN  3.1*   TBILIRUBIN  0.4   ALKPHOSPHAT  211*   TOTPROTEIN  6.7   ALTSGPT  10   ASTSGOT  23   CREATININE  0.91       Imaging:  Ct-needle Bx-muscle Right    Result Date: 3/13/2019  3/11/2019 4:44 PM HISTORY/REASON FOR EXAM:  Right gluteal hypodense lesion. Moderate sedation was administered with continuous monitoring of the patient under the direct supervision of  Medications: 2 mg of Versed and 200 mcg of fentanyl Total sedation time 60 minutes Procedure: After the informed consent the patient was placed on the CT table on prone position. Localization CT scan was obtained. It demonstrates hypodense area in the right gluteus muscle. Subsequently a 17-gauge needle was advanced into the lesion. 20  mL of pus was aspirated. The materials were sent to the pathology. The patient tolerated the procedure without any immediate competition.     CT-guided aspiration of pus like material in the right gluteal muscle lesion.    Ec-echocardiogram Complete W/o Cont    Result Date: 3/13/2019  Transthoracic Echo Report Echocardiography Laboratory CONCLUSIONS No prior study is available for comparison. Normal left ventricular systolic function. Left ventricular ejection fraction is visually estimated to be 65%. Normal diastolic function. Normal inferior vena cava size and inspiratory collapse. Aortic sclerosis without stenosis.  Estimated right ventricular systolic pressure  is 33 mmHg. No obvious valvular vegetations are noted on a decent quality study.  If further valvular assessment is clinically indicated, consider transesophageal echocardiogram. SAM,  LV EF:  65    % FINDINGS Left Ventricle Normal left ventricular size and systolic function. Normal left ventricular wall thickness. Left ventricular ejection fraction is visually estimated to be 65%. Normal diastolic function. Right Ventricle Normal right ventricular size and systolic function. Right Atrium Normal right atrial size. Normal inferior vena cava size and inspiratory collapse. Left Atrium Normal left atrial size. Mitral Valve Structurally normal mitral valve without significant stenosis or regurgitation. Aortic Valve Aortic sclerosis without stenosis. No aortic insufficiency. Tricuspid Valve Structurally normal tricuspid valve. Mild tricuspid regurgitation. Right atrial pressure is estimated to be 3 mmHg. Estimated right ventricular systolic pressure  is 33 mmHg. Pulmonic Valve The pulmonic valve is not well visualized. No pulmonic insufficiency. Pericardium Normal pericardium without effusion. Aorta The aortic root is normal.  Ascending aorta diameter is 3.0 cm. Claire Tripathi MD (Electronically Signed) Final Date:     13 March 2019                 14:38      Micro:  Results     Procedure Component Value Units Date/Time    URINALYSIS [[id number]]  (Abnormal) Collected:  03/23/19 1710    Order Status:  Completed Specimen:  Urine from Urine, Cath Updated:  03/23/19 1825     Color Yellow     Character Sl Cloudy (A)     Specific Gravity 1.014     Ph 6.0     Glucose Negative mg/dL      Ketones Negative mg/dL      Protein 30 (A) mg/dL      Bilirubin Negative     Urobilinogen, Urine 0.2     Nitrite Negative     Leukocyte Esterase Trace (A)     Occult Blood Trace (A)     Micro Urine Req Microscopic    Narrative:       Collected By:[id number] MARSHA DAS    BLOOD CULTURE [[id number]]  "Collected:  03/12/19 1826    Order Status:  Completed Specimen:  Blood from Peripheral Updated:  03/17/19 2100     Significant Indicator NEG     Source BLD     Site PERIPHERAL     Blood Culture No growth after 5 days of incubation.    Narrative:       Per Hospital Policy: Only change Specimen Src: to \"Line\" if  specified by physician order.    BLOOD CULTURE [[id number]] Collected:  03/12/19 1826    Order Status:  Completed Specimen:  Blood from Peripheral Updated:  03/17/19 2100     Significant Indicator NEG     Source BLD     Site PERIPHERAL     Blood Culture No growth after 5 days of incubation.    Narrative:       Per Hospital Policy: Only change Specimen Src: to \"Line\" if  specified by physician order.          Assessment:  Active Hospital Problems    Diagnosis   • *Lesion of brain [G93.9]   • Mass of iliopsoas muscle group [M62.89]   • Encephalopathy [G93.40]   • Hyponatremia [E87.1]   • History of breast cancer [Z85.3]   • Diabetes mellitus type 2 in obese (HCC) [E11.69, E66.9]       Plan:  Multiple brain lesions  Low-grade fevers resolved  No current leukocytosis  Persistent AMS-waxing and waning, but improved  MRI with scattered small lesions incl aaron  TTE neg; CHAS neg  Bcxs neg to date  Brain lesions too small to biopsy   Dr. Nguyen of neurosurgery recommends repeating imaging in 2 weeks  Repeat MRI prior to completion of abx    Iliopsoas mass, on treatment  CT + 2.6 cm lesion in the right iliopsoas region as well as a 2.8x2.1 cm lesion in the right gluteal musculature   S/p aspiration +WBCs-cultures negative to date  Bcxs remain neg  Discontinued Vanco secondary to elevated Vanco troughs and patient not clearing well  Continue cefepime and linezolid  Anticipate 14 day course  Stop date 3/28/19  Monitor CBC while on linezolid  Midline ordered    Diarrhea, persistent  DC stool softeners on 3/23  If continues or worsens, check C. difficile PCR    H/o breast cancer  Markedly elevated alk phos  Recommend " continued diaz for mets    Type 2 diabetes mellitus  Hemoglobin A1c 6.3  Keep BS under 150 to help control current infection    I have performed a physical exam and reviewed and updated ROS and plan today 3/24/2019.  In review of yesterday's note 3/23/2019, there are no changes except as documented above.    Disposition: SNF in [place].  However patient will complete her course of antibiotics in the hospital prior to transfer.  Recommend she has repeat imaging done prior to transfer as well    DW Dr. Ramírez

## 2019-03-24 NOTE — CARE PLAN
Problem: Safety  Goal: Will remain free from falls  Outcome: PROGRESSING AS EXPECTED  Pt educated to call. Bed alarm is on    Problem: Infection  Goal: Will remain free from infection  Outcome: PROGRESSING AS EXPECTED  Pt is afebrile

## 2019-03-24 NOTE — PROGRESS NOTES
/51   Pulse 89   Temp 36.7 °C (98.1 °F) (Temporal)   Resp 18   Ht 1.524 m (5')   Wt 62.9 kg (138 lb 10.7 oz)   SpO2 96%   Breastfeeding? No   BMI 27.08 kg/m²   Received report and assumed care of pt at 0700. Pt is A&Ox4. Pt complains of pain 8/10. Ultram given for the pain. Pt denies SOB, nausea, or numbness. Pt complains of itching to the groin area. MD notified. MD ordered continued nystatin for the groin area. Bed is in lowest position and call light is within reach. Hourly is in place.

## 2019-03-24 NOTE — CARE PLAN
Problem: Communication  Goal: The ability to communicate needs accurately and effectively will improve  Outcome: PROGRESSING AS EXPECTED  Discussed POC with pt, all questions answered    Problem: Safety  Goal: Will remain free from injury  Outcome: PROGRESSING SLOWER THAN EXPECTED  Bed alarm in place, close to nursing station

## 2019-03-24 NOTE — PROGRESS NOTES
"[facility] Medicine Daily Progress Note    Date of Service  3/24/2019    Chief Complaint  68 y.o. female admitted 3/8/2019 with right hip pain, fever and abnormal brain mri.    Hospital Course    Patient started on empiric antibiotics given fever.  She had abnormal findings on MRI brain and CT showed \"lesion\" on right iliopsoas.  This lesion was biopsied and turned out to be abscess.  She has history of breast cancer and there is also concern of brain lesions being metastatic though their appearance is not typical of this.      Interval Problem Update  3/12 Discussed with Dr Turner for second opinion of brain lesions and based on appearance not able to r/o or r/i any diagnosis.  Given patient's presentation of confusion, hip pain and fever  - this is more supportive of infectious etiology rather than malignant.  Fluid from right gluteal area sent to micro and as of yet - no growth.  Continue zosyn for now.  3/13 Patient with slight improvement in mentation.  Blood cultures and abscess cultures are showing no growth at this time.  TTE being done while I examined patient - no evidence of vegetations on this test.  ID consultation pending - d/w Dr Garcia.  3/14 Patient feeling well today, her pain is present but not as bad as prior to admission.  She was able to follow the conversation today and is agreeable to move forward with the CHAS tomorrow.  I spoke with her brother, René, and updated him on condition yesterday afternoon also.  Will also have PICC placed in next few days as long as blood cultures remain negative as I expect a prolonged antibiotic course of treatment.  3/15 Patient without new complaints, states she needs help to move in bed.  CHAS showed no evidence of vegetations and regular diet resumed.  Culture remains negative and biopsy of brain lesion may prove needed - will watch through the weekend and if mentation does not continue to improve, will discuss with neurosurgery on Monday.  3/16 Patient states " she is okay today, mentation has waxed and waned without significant improvement.  She was febrile earlier today.  No other acute events.  3/17 Patient with significant improvement in mentation today, she is aware of her hospitalization, not falling asleep mid sentence.  Her pain mediations were held most of yesterday and likely far too strong for patient and were affecting her awareness.  Oxycodone 5 discontinued and will use tramadol instead unless pain not well controlled.  CT brain with contrast ordered as a quick scan for comparison to MRI.    3/18 Patient feeling same today, discussed need to discuss with neurosurgery for possible biopsy.  Discussed with Dr Nguyen, he reviewed MRI and CT brain and he feels they are likely metastatic lesions but are far too small to biopsy.  His recommendation is to continue with antibiotics and re-image in 2 weeks to see if any change that would be amenable to biopsy or that would further declare infection.    3/19: no acute issue overnight. Continuing IV abx to complete 2 weeks and repeat imaging after. Denies headache, chest pain, nausea, vomiting.    3/20: the patient is sleeping comfortably. No acute issue overnight.    3/21: complaint about weakness. Tried to get her up and sit on the chair.     3/22: her IV line blew again today. Will order midline for her today. No acute event. Eating breakfast.    3/23: no acute issue overnight. Just woke up this am. Had MID line placed yeseterday.    3/24: She complained about dysuria yesterday but this morning it was better.  She has some fungal infection in her bilateral groin area.  Started on nystatin powder.    Consultants/Specialty  ID - de    Code Status  full    Disposition  SNF    Review of Systems  Review of Systems   Constitutional: Positive for malaise/fatigue. Negative for fever.   HENT: Negative for congestion and sore throat.    Eyes: Negative for blurred vision, double vision and photophobia.   Respiratory:  Negative for cough.    Cardiovascular: Negative for chest pain.   Gastrointestinal: Negative for heartburn, nausea and vomiting.   Genitourinary: Negative for dysuria and urgency.   Musculoskeletal: Positive for myalgias (hip pain). Joint pain: left hip pain.   Skin: Negative for itching and rash.   Neurological: Positive for weakness. Negative for dizziness.   Psychiatric/Behavioral: Negative for depression.        Physical Exam  Temp:  [36.8 °C (98.2 °F)-37.2 °C (98.9 °F)] 37 °C (98.6 °F)  Pulse:  [] 93  Resp:  [16-20] 20  BP: (127-141)/(65-77) 137/77  SpO2:  [96 %-99 %] 97 %    Physical Exam   Constitutional: She is oriented to person, place, and time. She appears well-developed and well-nourished. No distress.   HENT:   Head: Normocephalic.   Eyes: Pupils are equal, round, and reactive to light. Conjunctivae are normal.   Neck: Normal range of motion.   Cardiovascular: Normal rate and regular rhythm.    Pulmonary/Chest: Effort normal and breath sounds normal. No respiratory distress. She has no wheezes.   Abdominal: Soft. Bowel sounds are normal. She exhibits no distension.   Musculoskeletal: She exhibits no edema or tenderness.   Neurological: She is alert and oriented to person, place, and time. No cranial nerve deficit.   Skin: Skin is warm and dry. She is not diaphoretic. No erythema.   Bilateral groin fungal infection   Psychiatric: She has a normal mood and affect.   Nursing note and vitals reviewed.      Fluids    Intake/Output Summary (Last 24 hours) at 03/24/19 0742  Last data filed at 03/24/19 0514   Gross per 24 hour   Intake             3100 ml   Output              300 ml   Net             2800 ml       Laboratory  Recent Labs      03/23/19   0022   WBC  6.3   RBC  4.38   HEMOGLOBIN  11.0*   HEMATOCRIT  36.0*   MCV  82.2   MCH  25.1*   MCHC  30.6*   RDW  53.1*   PLATELETCT  230   MPV  9.9     Recent Labs      03/23/19   0022   SODIUM  134*   POTASSIUM  4.1   CHLORIDE  102   CO2  24   GLUCOSE   110*   BUN  31*   CREATININE  0.91   CALCIUM  10.7*                   Imaging  IR-MIDLINE CATHETER INSERTION WO GUIDANCE > AGE 3   Final Result                  Ultrasound-guided midline placement performed by qualified nursing staff    as above.          CT-HEAD WITH   Final Result      Multiple subcentimeter too numerous to count enhancing masses scattered throughout the supratentorial and infratentorial brain. These demonstrate some surrounding vasogenic edema and most likely represent multifocal metastatic lesions. Other    considerations would include multifocal abscesses or septic emboli.      EC-CHAS W/O CONT   Final Result      EC-CHAS W/O CONT   Final Result      CT-NEEDLE BX-MUSCLE RIGHT   Final Result      CT-guided aspiration of pus like material in the right gluteal muscle lesion.      EC-ECHOCARDIOGRAM COMPLETE W/O CONT   Final Result      OUTSIDE IMAGES-CT CHEST/ABDOMEN/PELVIS   Final Result      OUTSIDE IMAGES-DX CHEST   Final Result      SL-[id number] FILM X-RAY   Final Result      OUTSIDE IMAGES-MR BRAIN   Final Result      OUTSIDE IMAGES-MR BRAIN   Final Result           Assessment/Plan  * Lesion of brain   Assessment & Plan    Metastatic cancer vs infectious etiology  D/w Dr Black for questionable brain mets, recommending IR Bx of psoas muscle   IR Bx of psoas muscle ordered - was abscess  Empiric treatment of infection  TTE and CHAS negative for vegetations.  D/w Dr Nguyen - lesions likely metastatic based on his interpretation of MRI/CT - too small to biopsy, recommends continuing antibiotics and re-imaging in 2 weeks to see if there has been any change.  MRI brain to be done once she completes abx on 3/28         Encephalopathy   Assessment & Plan    Likely toxic metabolic from infection  Improved       Mass of iliopsoas muscle group   Assessment & Plan    IR Bx of psoas muscle completed, fluid drained - abscess, no growth  On linezolid and cefepime and last dose 3/28/19  ID on          Normocytic anemia   Assessment & Plan    stable     RLS (restless legs syndrome)- (present on admission)   Assessment & Plan    Pramipexole       Diabetes mellitus type 2 in obese (HCC)- (present on admission)   Assessment & Plan    Well controlled   Short acting insulin as needed   Accu-Checks, hypoglycemia protocol          History of breast cancer- (present on admission)   Assessment & Plan    Mets vs infection to brain  No known active breast cancer.       COPD (chronic obstructive pulmonary disease) (HCC)- (present on admission)   Assessment & Plan    Oxygen as needed, Respiratory protocol, Bronchodilators, Incentive spirometry      Urinary tract infection   Assessment & Plan    On cefepime already  Follow-up cultures     Fungal infection of the groin   Assessment & Plan    Started on nystatin powder     History of deep vein thrombosis- (present on admission)   Assessment & Plan    History of deep vein thrombosis   Was on Rivaroxaban as outpatient.     Held for Bx Mar 11  Restart AC once repeat CT head is done         Essential hypertension- (present on admission)   Assessment & Plan    Restarted on losartan and furosemide with hold parameters.     GERD (gastroesophageal reflux disease)- (present on admission)   Assessment & Plan    Omeprazole     Chronic pain- (present on admission)   Assessment & Plan     Multifactorial  Likely secondary to abscess, fracture  Pain control            VTE prophylaxis: lovenox

## 2019-03-25 PROCEDURE — 700105 HCHG RX REV CODE 258: Performed by: INTERNAL MEDICINE

## 2019-03-25 PROCEDURE — 99232 SBSQ HOSP IP/OBS MODERATE 35: CPT | Performed by: INTERNAL MEDICINE

## 2019-03-25 PROCEDURE — 700102 HCHG RX REV CODE 250 W/ 637 OVERRIDE(OP): Performed by: HOSPITALIST

## 2019-03-25 PROCEDURE — 770004 HCHG ROOM/CARE - ONCOLOGY PRIVATE *

## 2019-03-25 PROCEDURE — A9270 NON-COVERED ITEM OR SERVICE: HCPCS | Performed by: HOSPITALIST

## 2019-03-25 PROCEDURE — 700111 HCHG RX REV CODE 636 W/ 250 OVERRIDE (IP): Performed by: HOSPITALIST

## 2019-03-25 PROCEDURE — A9270 NON-COVERED ITEM OR SERVICE: HCPCS | Performed by: INTERNAL MEDICINE

## 2019-03-25 PROCEDURE — 700111 HCHG RX REV CODE 636 W/ 250 OVERRIDE (IP): Performed by: INTERNAL MEDICINE

## 2019-03-25 PROCEDURE — 700102 HCHG RX REV CODE 250 W/ 637 OVERRIDE(OP): Performed by: INTERNAL MEDICINE

## 2019-03-25 PROCEDURE — 97530 THERAPEUTIC ACTIVITIES: CPT

## 2019-03-25 PROCEDURE — 99233 SBSQ HOSP IP/OBS HIGH 50: CPT | Mod: GC | Performed by: INTERNAL MEDICINE

## 2019-03-25 RX ADMIN — GABAPENTIN 300 MG: 300 CAPSULE ORAL at 05:44

## 2019-03-25 RX ADMIN — TRAMADOL HYDROCHLORIDE 50 MG: 50 TABLET, COATED ORAL at 05:44

## 2019-03-25 RX ADMIN — NYSTATIN: 100000 POWDER TOPICAL at 18:22

## 2019-03-25 RX ADMIN — LOSARTAN POTASSIUM 50 MG: 50 TABLET ORAL at 05:44

## 2019-03-25 RX ADMIN — OXYCODONE HYDROCHLORIDE 2.5 MG: 5 TABLET ORAL at 16:11

## 2019-03-25 RX ADMIN — CEFEPIME 2 G: 2 INJECTION, POWDER, FOR SOLUTION INTRAVENOUS at 05:42

## 2019-03-25 RX ADMIN — FUROSEMIDE 40 MG: 40 TABLET ORAL at 18:20

## 2019-03-25 RX ADMIN — OMEPRAZOLE 20 MG: 20 CAPSULE, DELAYED RELEASE ORAL at 05:44

## 2019-03-25 RX ADMIN — VANCOMYCIN HYDROCHLORIDE 1600 MG: 100 INJECTION, POWDER, LYOPHILIZED, FOR SOLUTION INTRAVENOUS at 16:19

## 2019-03-25 RX ADMIN — MORPHINE SULFATE 2 MG: 4 INJECTION INTRAVENOUS at 21:28

## 2019-03-25 RX ADMIN — FUROSEMIDE 40 MG: 40 TABLET ORAL at 05:43

## 2019-03-25 RX ADMIN — CEFEPIME 2 G: 2 INJECTION, POWDER, FOR SOLUTION INTRAVENOUS at 18:18

## 2019-03-25 RX ADMIN — LABETALOL HCL 100 MG: 200 TABLET, FILM COATED ORAL at 08:19

## 2019-03-25 RX ADMIN — POTASSIUM CHLORIDE 40 MEQ: 1500 TABLET, EXTENDED RELEASE ORAL at 18:20

## 2019-03-25 RX ADMIN — GABAPENTIN 300 MG: 300 CAPSULE ORAL at 18:20

## 2019-03-25 RX ADMIN — ENOXAPARIN SODIUM 40 MG: 100 INJECTION SUBCUTANEOUS at 05:43

## 2019-03-25 RX ADMIN — NYSTATIN: 100000 POWDER TOPICAL at 05:44

## 2019-03-25 RX ADMIN — LINEZOLID 600 MG: 600 TABLET, FILM COATED ORAL at 05:42

## 2019-03-25 RX ADMIN — POTASSIUM CHLORIDE 40 MEQ: 1500 TABLET, EXTENDED RELEASE ORAL at 05:43

## 2019-03-25 ASSESSMENT — COGNITIVE AND FUNCTIONAL STATUS - GENERAL
STANDING UP FROM CHAIR USING ARMS: TOTAL
MOVING FROM LYING ON BACK TO SITTING ON SIDE OF FLAT BED: A LOT
TURNING FROM BACK TO SIDE WHILE IN FLAT BAD: A LOT
MOVING TO AND FROM BED TO CHAIR: UNABLE
SUGGESTED CMS G CODE MODIFIER MOBILITY: CM
CLIMB 3 TO 5 STEPS WITH RAILING: TOTAL
WALKING IN HOSPITAL ROOM: TOTAL
MOBILITY SCORE: 8

## 2019-03-25 ASSESSMENT — ENCOUNTER SYMPTOMS
VOMITING: 0
DIARRHEA: 1
BLURRED VISION: 0
HEARTBURN: 0
FEVER: 0
BACK PAIN: 1
HEADACHES: 0
MYALGIAS: 1
CHILLS: 0
COUGH: 0
DIZZINESS: 0
PALPITATIONS: 0
NAUSEA: 0
WEAKNESS: 1
PHOTOPHOBIA: 0
DEPRESSION: 0
SORE THROAT: 0
ABDOMINAL PAIN: 0

## 2019-03-25 ASSESSMENT — GAIT ASSESSMENTS
DISTANCE (FEET): 5
GAIT LEVEL OF ASSIST: MODERATE ASSIST
DEVIATION: STEP TO;DECREASED BASE OF SUPPORT;BRADYKINETIC;DECREASED HEEL STRIKE;DECREASED TOE OFF;OTHER (COMMENT)
ASSISTIVE DEVICE: FRONT WHEEL WALKER

## 2019-03-25 NOTE — CARE PLAN
Problem: Communication  Goal: The ability to communicate needs accurately and effectively will improve  Outcome: PROGRESSING AS EXPECTED  Discussed POC with pt, all questions answered     Problem: Safety  Goal: Will remain free from injury  Outcome: PROGRESSING AS EXPECTED  Bed alarm in place, max assist.

## 2019-03-25 NOTE — PROGRESS NOTES
A/ox3- disoriented to time, bed alarm in place.  R midline running NS @75mL/hr.  Up with 2 person assist to bedside commode or bedpan.   Pt can be incontinent at times.   Has Tramadol PRN for pain.   Pt complaining of more pain in groin area.  Rounding in place, all needs met at this time.

## 2019-03-25 NOTE — PROGRESS NOTES
· 2 RN skin check complete with Taniya NGUYEN.  · Devices in place: oxygen and glasses .  · Skin assessed under devices.  · Blanching redness to top of ears bilaterally, excoriation and rash to buttocks and coccyx.  · The following interventions in place: Q2 turns in place, moisture barrier lotion in use/barrier cream, silicone O2 tubing in use, pt incontinent - frequent care, nystatin powder in use.

## 2019-03-25 NOTE — THERAPY
"Physical Therapy Treatment completed.   Bed Mobility:  Supine to Sit: Moderate Assist  Transfers: Sit to Stand: Minimal Assist  Gait: Level Of Assist: Moderate Assist with Front-Wheel Walker       Plan of Care: Will benefit from Physical Therapy 3 times per week  Discharge Recommendations: Equipment: Will Continue to Assess for Equipment Needs. Recommend inpatient transitional care services for continued physical therapy services.      See \"Rehab Therapy-Acute\" Patient Summary Report for complete documentation.     Pt is progressing slower than expected, demonstrating dec functional mobility, balance, sequencing, and weakness. Pt was able to demonstrate Mod A for all functional mobility at this time w/FWW use. Pt requires Max cues and redirection with use and navigation of FWW when intiating steps and transfer to chair. Pt demonstrates with consistent posterior lean and LOB once upright and requires Min A to maintain upright balance. Pt continues to present with odd affect and impaired cognition, appears to be confused at times. Pt will continue to benefit from skilled PT while in house with recommendation for post acute therapy prior to d/c home. RN aware of functional decline.   "

## 2019-03-25 NOTE — PROGRESS NOTES
Infectious Disease Progress Note    Author: Brooke Sosa M.D. Date & Time of service: 3/25/2019  1:46 PM    Chief Complaint:  Multiple brain lesions  Iliopsoas lesion    Interval History:  68-year-old female with history of diabetes and breast cancer, admitted 3/8/2019 with abdominal pain, iliopsoas lesion, and an abnormal MRI with multiple brain lesions.  3/14 AF (99.2) WBC 5.8 more awake today as compared to yesterday but not oriented-denies pain, following commands. ECHO reviewed  3/15 AF (99.1) no WBC today Had CHAS today  3/16 temp 100.5 WBC 7.9 more confused today-naked but pushing off sheets  3/17 AF WBC not done Less obtunded today-not oriented. Denies pain, N/V, HA, visual changes etc  3/18 afebrile WBC 6 patient sitting up in bed eating breakfast, she is quite soft spoken.  She denies any headache, nausea or vomiting.  Endorses a mild cough.  3/19 AF pt denies any new symptoms, no HA. Brain lesions too small for biopsy per notes. Neurosurgery recommends repeating scan in 2 weeks.  3/20 afebrile patient is sleeping and difficult to arouse  3/21 patient is sitting up in bed and is alert and awake.  She however appears to have some waxing confusion but no she is in the hospital in [place].  Complains of lower back pain but no headache, nausea or vomiting  3/22 AF no CBC, continues to have bowel incontinence but not watery per RN, pt has no new complaints today, not engaged in conversation  3/23 afebrile WBC 6.3 patient complaining of stool incontinence and diarrhea, midline placed yesterday  3/24 afebrile patient rested comfortably without any new complaints  3/25 T- max 98.5, no new labs since 03/23. Reports mild HA and weakness. Emotional when asked about hx of hip fx.    Labs Reviewed    Review of Systems:  Review of Systems   Constitutional: Negative for chills and fever.   Cardiovascular: Negative for chest pain and palpitations.   Gastrointestinal: Positive for diarrhea. Negative for abdominal pain, nausea  and vomiting.   Musculoskeletal: Positive for back pain.   Neurological: Positive for weakness. Negative for dizziness and headaches.   Limited review of systems secondary to intermittent confusion    Hemodynamics:  Temp (24hrs), Av.6 °C (97.9 °F), Min:36.4 °C (97.5 °F), Max:36.9 °C (98.5 °F)  Temperature: 36.9 °C (98.5 °F)  Pulse  Av.1  Min: 70  Max: 153  Blood Pressure : (!) 175/88       Physical Exam:  Physical Exam   Constitutional: She appears well-developed.   HENT:   Head: Normocephalic and atraumatic.   Mouth/Throat: Oropharynx is clear and moist. No oropharyngeal exudate.   Eyes: Pupils are equal, round, and reactive to light. EOM are normal. Right eye exhibits no discharge. Left eye exhibits no discharge. No scleral icterus.   Neck: Neck supple. No JVD present.   Cardiovascular: Normal rate and intact distal pulses.    Murmur heard.  Pulmonary/Chest: Effort normal. No stridor. No respiratory distress.   Abdominal: Soft. Bowel sounds are normal. She exhibits no distension. There is no tenderness.   Musculoskeletal: She exhibits no edema, tenderness or deformity.   M   Lymphadenopathy:     She has no cervical adenopathy.   Neurological: She is alert.   Intermittently confused.  Unable to move RLE, weak LLE    Skin: Skin is warm. No rash noted. She is not diaphoretic.   Well-healed left breast mastectomy scar   Psychiatric:   Emotional and tearful    Nursing note and vitals reviewed.      Meds:    Current Facility-Administered Medications:   •  nystatin  •  NS  •  linezolid  •  labetalol  •  ibuprofen  •  potassium chloride SA  •  haloperidol lactate  •  cefepime  •  enoxaparin (LOVENOX) injection  •  pramipexole  •  furosemide  •  tramadol  •  losartan  •  gabapentin  •  omeprazole  •  Respiratory Care per Protocol  •  Respiratory Care per Protocol  •  acetaminophen  •  Notify provider if pain remains uncontrolled **AND** Use the numeric rating scale (NRS-11) on regular floors and Critical-Care  Pain Observation Tool (CPOT) on ICUs/Trauma to assess pain **AND** Pulse Ox (Oximetry) **AND** Pharmacy Consult Request **AND** If patient difficult to arouse and/or has respiratory depression, stop any opiates that are currently infusing and call a Rapid Response. **AND** oxyCODONE immediate-release **AND** [DISCONTINUED] oxyCODONE immediate-release **AND** morphine injection  •  ondansetron  •  ondansetron    Labs:  Recent Labs      03/23/19   0022   WBC  6.3   RBC  4.38   HEMOGLOBIN  11.0*   HEMATOCRIT  36.0*   MCV  82.2   MCH  25.1*   RDW  53.1*   PLATELETCT  230   MPV  9.9   NEUTSPOLYS  62.60   LYMPHOCYTES  18.70*   MONOCYTES  10.50   EOSINOPHILS  6.90   BASOPHILS  0.50     Recent Labs      03/23/19 0022   SODIUM  134*   POTASSIUM  4.1   CHLORIDE  102   CO2  24   GLUCOSE  110*   BUN  31*     Recent Labs      03/23/19 0022   ALBUMIN  3.1*   TBILIRUBIN  0.4   ALKPHOSPHAT  211*   TOTPROTEIN  6.7   ALTSGPT  10   ASTSGOT  23   CREATININE  0.91       Imaging:  Ct-needle Bx-muscle Right    Result Date: 3/13/2019  3/11/2019 4:44 PM HISTORY/REASON FOR EXAM:  Right gluteal hypodense lesion. Moderate sedation was administered with continuous monitoring of the patient under the direct supervision of  Medications: 2 mg of Versed and 200 mcg of fentanyl Total sedation time 60 minutes Procedure: After the informed consent the patient was placed on the CT table on prone position. Localization CT scan was obtained. It demonstrates hypodense area in the right gluteus muscle. Subsequently a 17-gauge needle was advanced into the lesion. 20  mL of pus was aspirated. The materials were sent to the pathology. The patient tolerated the procedure without any immediate competition.     CT-guided aspiration of pus like material in the right gluteal muscle lesion.    Ec-echocardiogram Complete W/o Cont    Result Date: 3/13/2019  Transthoracic Echo Report Echocardiography Laboratory CONCLUSIONS No prior study is available  for comparison. Normal left ventricular systolic function. Left ventricular ejection fraction is visually estimated to be 65%. Normal diastolic function. Normal inferior vena cava size and inspiratory collapse. Aortic sclerosis without stenosis. Estimated right ventricular systolic pressure  is 33 mmHg. No obvious valvular vegetations are noted on a decent quality study.  If further valvular assessment is clinically indicated, consider transesophageal echocardiogram. SAM,  LV EF:  65    % FINDINGS Left Ventricle Normal left ventricular size and systolic function. Normal left ventricular wall thickness. Left ventricular ejection fraction is visually estimated to be 65%. Normal diastolic function. Right Ventricle Normal right ventricular size and systolic function. Right Atrium Normal right atrial size. Normal inferior vena cava size and inspiratory collapse. Left Atrium Normal left atrial size. Mitral Valve Structurally normal mitral valve without significant stenosis or regurgitation. Aortic Valve Aortic sclerosis without stenosis. No aortic insufficiency. Tricuspid Valve Structurally normal tricuspid valve. Mild tricuspid regurgitation. Right atrial pressure is estimated to be 3 mmHg. Estimated right ventricular systolic pressure  is 33 mmHg. Pulmonic Valve The pulmonic valve is not well visualized. No pulmonic insufficiency. Pericardium Normal pericardium without effusion. Aorta The aortic root is normal.  Ascending aorta diameter is 3.0 cm. Claire Tripathi MD (Electronically Signed) Final Date:     13 March 2019                 14:38      Micro:  Results     Procedure Component Value Units Date/Time    URINALYSIS [[id number]]  (Abnormal) Collected:  03/23/19 1710    Order Status:  Completed Specimen:  Urine from Urine, Cath Updated:  03/23/19 1825     Color Yellow     Character Sl Cloudy (A)     Specific Gravity 1.014     Ph 6.0     Glucose Negative mg/dL      Ketones Negative mg/dL      Protein 30 (A) mg/dL       Bilirubin Negative     Urobilinogen, Urine 0.2     Nitrite Negative     Leukocyte Esterase Trace (A)     Occult Blood Trace (A)     Micro Urine Req Microscopic    Narrative:       Collected By:[id number] MARSHA DAS          Assessment:  Active Hospital Problems    Diagnosis   • *Lesion of brain [G93.9]   • Mass of iliopsoas muscle group [M62.89]   • Encephalopathy [G93.40]   • Hyponatremia [E87.1]   • History of breast cancer [Z85.3]   • Diabetes mellitus type 2 in obese (HCC) [E11.69, E66.9]       Plan:  Multiple brain lesions  Low-grade fevers resolved  No current leukocytosis. Last lab 03/23  Persistent AMS-waxing and waning, but improved  MRI with scattered small lesions incl aaron, too small to biopsy per neurosurgery  TTE neg; CHAS neg  Bcxs neg to date  ID and neurosurgery recommends repeating imaging in 2-3 weeks  Repeat MRI prior to completion of abx      Iliopsoas mass, on treatment  CT + 2.6 cm lesion in the right iliopsoas region as well as a 2.8x2.1 cm lesion in the right gluteal musculature   S/p aspiration +WBCs-cultures negative to date  Bcxs remain neg  Discontinued Vanco secondary to elevated Vanco troughs and patient not clearing well  Continue cefepime and linezolid  Anticipate 14 day course  Stop date 3/28/19  Monitor CBC while on linezolid  Midline ordered  Order MRI w/ contrast lumbar, pelvis, and hips bilat. IVNS after contrast and restart vanco the following day as infection is most likely staph.     Diarrhea, persistent  DC stool softeners on 3/23  If continues or worsens, check C. difficile PCR    H/o breast cancer  Markedly elevated alk phos  Recommend continued diaz for mets    Type 2 diabetes mellitus  Hemoglobin A1c 6.3  Keep BS under 150 to help control current infection    I have performed a physical exam and reviewed and updated ROS and plan today 3/25/2019.  In review of yesterday's note 3/24/2019, there are no changes except as documented above.    Disposition: SNF in [place].   However patient will complete her course of antibiotics in the hospital prior to transfer.  Recommend she has repeat imaging done prior to transfer as well    Discussed with attending Dr. Johnson and primary team Dr. Ramírez

## 2019-03-25 NOTE — PROGRESS NOTES
Pharmacy Kinetics 68 y.o. female on vancomycin day # 1 3/25/2019    Indication for Treatment: possible CNS infection    Pertinent history per medical record: Admitted on 3/8/2019 for iliopsoas mass and multiple brain lesions.  There is concern that this is infection.  All cultures are negative to date, but were drawn will on pip/tazo.  CHAS was negative. ID is following. Patient has a history of breast cancer and DM.     Other antibiotics: cefepime 2 gm IV q12h    Allergies: Patient has no known allergies.     List concerns for renal function: elevated BUN/SCr ratio, age    Pertinent cultures to date:   3/11/19 wound - right gluteal mass: negative  3/12/19 blood - peripheral x 2: no growth      Recent Labs      19   0022   WBC  6.3   NEUTSPOLYS  62.60     Recent Labs      19   0022   BUN  31*   CREATININE  0.91   ALBUMIN  3.1*     Intake/Output Summary (Last 24 hours) at 19 1456  Last data filed at 19 1734   Gross per 24 hour   Intake             2459 ml   Output                0 ml   Net             2459 ml      Blood pressure (!) 175/88, pulse (!) 104, temperature 36.9 °C (98.5 °F), temperature source Oral, resp. rate 16, height 1.524 m (5'), weight 62.9 kg (138 lb 10.7 oz), SpO2 95 %, not currently breastfeeding. Temp (24hrs), Av.6 °C (97.9 °F), Min:36.4 °C (97.5 °F), Max:36.9 °C (98.5 °F)      A/P   1. Vancomycin dose change: Start 1600 mg IV x 1 then 1000 mg IV q24h  2. Next vancomycin level: prior to the 4th total dose  3. Goal trough: 16-20 mcg/mL  4. Comments: Patient had a supratherapeutic trough while on q12h dosing previously.  Will start q24h and monitor renal function closely.    Dulce Mills, PharmD, BCPS-ID

## 2019-03-26 ENCOUNTER — APPOINTMENT (OUTPATIENT)
Dept: RADIOLOGY | Facility: MEDICAL CENTER | Age: 69
DRG: 981 | End: 2019-03-26
Attending: HOSPITALIST
Payer: MEDICARE

## 2019-03-26 LAB
ANION GAP SERPL CALC-SCNC: 8 MMOL/L (ref 0–11.9)
BASOPHILS # BLD AUTO: 0.6 % (ref 0–1.8)
BASOPHILS # BLD: 0.05 K/UL (ref 0–0.12)
BUN SERPL-MCNC: 22 MG/DL (ref 8–22)
CALCIUM SERPL-MCNC: 10.7 MG/DL (ref 8.5–10.5)
CHLORIDE SERPL-SCNC: 99 MMOL/L (ref 96–112)
CO2 SERPL-SCNC: 27 MMOL/L (ref 20–33)
CREAT SERPL-MCNC: 0.87 MG/DL (ref 0.5–1.4)
EOSINOPHIL # BLD AUTO: 0.21 K/UL (ref 0–0.51)
EOSINOPHIL NFR BLD: 2.6 % (ref 0–6.9)
ERYTHROCYTE [DISTWIDTH] IN BLOOD BY AUTOMATED COUNT: 51 FL (ref 35.9–50)
GLUCOSE SERPL-MCNC: 116 MG/DL (ref 65–99)
HCT VFR BLD AUTO: 37.4 % (ref 37–47)
HGB BLD-MCNC: 11.5 G/DL (ref 12–16)
IMM GRANULOCYTES # BLD AUTO: 0.03 K/UL (ref 0–0.11)
IMM GRANULOCYTES NFR BLD AUTO: 0.4 % (ref 0–0.9)
LYMPHOCYTES # BLD AUTO: 0.92 K/UL (ref 1–4.8)
LYMPHOCYTES NFR BLD: 11.5 % (ref 22–41)
MCH RBC QN AUTO: 24.9 PG (ref 27–33)
MCHC RBC AUTO-ENTMCNC: 30.7 G/DL (ref 33.6–35)
MCV RBC AUTO: 81 FL (ref 81.4–97.8)
MONOCYTES # BLD AUTO: 0.9 K/UL (ref 0–0.85)
MONOCYTES NFR BLD AUTO: 11.3 % (ref 0–13.4)
NEUTROPHILS # BLD AUTO: 5.89 K/UL (ref 2–7.15)
NEUTROPHILS NFR BLD: 73.6 % (ref 44–72)
NRBC # BLD AUTO: 0 K/UL
NRBC BLD-RTO: 0 /100 WBC
PLATELET # BLD AUTO: 193 K/UL (ref 164–446)
PMV BLD AUTO: 10.2 FL (ref 9–12.9)
POTASSIUM SERPL-SCNC: 4.2 MMOL/L (ref 3.6–5.5)
RBC # BLD AUTO: 4.62 M/UL (ref 4.2–5.4)
SODIUM SERPL-SCNC: 134 MMOL/L (ref 135–145)
WBC # BLD AUTO: 8 K/UL (ref 4.8–10.8)

## 2019-03-26 PROCEDURE — 99233 SBSQ HOSP IP/OBS HIGH 50: CPT | Performed by: INTERNAL MEDICINE

## 2019-03-26 PROCEDURE — A9270 NON-COVERED ITEM OR SERVICE: HCPCS | Performed by: HOSPITALIST

## 2019-03-26 PROCEDURE — 99232 SBSQ HOSP IP/OBS MODERATE 35: CPT | Mod: GC | Performed by: INTERNAL MEDICINE

## 2019-03-26 PROCEDURE — 700105 HCHG RX REV CODE 258: Performed by: INTERNAL MEDICINE

## 2019-03-26 PROCEDURE — 700102 HCHG RX REV CODE 250 W/ 637 OVERRIDE(OP): Performed by: HOSPITALIST

## 2019-03-26 PROCEDURE — 85025 COMPLETE CBC W/AUTO DIFF WBC: CPT

## 2019-03-26 PROCEDURE — 700111 HCHG RX REV CODE 636 W/ 250 OVERRIDE (IP): Performed by: INTERNAL MEDICINE

## 2019-03-26 PROCEDURE — A9270 NON-COVERED ITEM OR SERVICE: HCPCS | Performed by: INTERNAL MEDICINE

## 2019-03-26 PROCEDURE — 770004 HCHG ROOM/CARE - ONCOLOGY PRIVATE *

## 2019-03-26 PROCEDURE — 80048 BASIC METABOLIC PNL TOTAL CA: CPT

## 2019-03-26 PROCEDURE — 700117 HCHG RX CONTRAST REV CODE 255: Performed by: INTERNAL MEDICINE

## 2019-03-26 PROCEDURE — 700111 HCHG RX REV CODE 636 W/ 250 OVERRIDE (IP): Performed by: HOSPITALIST

## 2019-03-26 PROCEDURE — A9585 GADOBUTROL INJECTION: HCPCS | Performed by: INTERNAL MEDICINE

## 2019-03-26 PROCEDURE — 97535 SELF CARE MNGMENT TRAINING: CPT

## 2019-03-26 PROCEDURE — 72197 MRI PELVIS W/O & W/DYE: CPT

## 2019-03-26 PROCEDURE — 700102 HCHG RX REV CODE 250 W/ 637 OVERRIDE(OP): Performed by: INTERNAL MEDICINE

## 2019-03-26 PROCEDURE — 72158 MRI LUMBAR SPINE W/O & W/DYE: CPT

## 2019-03-26 RX ORDER — GADOBUTROL 604.72 MG/ML
6 INJECTION INTRAVENOUS ONCE
Status: COMPLETED | OUTPATIENT
Start: 2019-03-26 | End: 2019-03-26

## 2019-03-26 RX ADMIN — MEROPENEM 2 G: 1 INJECTION, POWDER, FOR SOLUTION INTRAVENOUS at 12:40

## 2019-03-26 RX ADMIN — MORPHINE SULFATE 2 MG: 4 INJECTION INTRAVENOUS at 14:08

## 2019-03-26 RX ADMIN — MEROPENEM 2 G: 1 INJECTION, POWDER, FOR SOLUTION INTRAVENOUS at 18:45

## 2019-03-26 RX ADMIN — VANCOMYCIN HYDROCHLORIDE 1000 MG: 100 INJECTION, POWDER, LYOPHILIZED, FOR SOLUTION INTRAVENOUS at 17:20

## 2019-03-26 RX ADMIN — LOSARTAN POTASSIUM 50 MG: 50 TABLET ORAL at 05:39

## 2019-03-26 RX ADMIN — NYSTATIN: 100000 POWDER TOPICAL at 18:44

## 2019-03-26 RX ADMIN — ENOXAPARIN SODIUM 40 MG: 100 INJECTION SUBCUTANEOUS at 05:35

## 2019-03-26 RX ADMIN — OXYCODONE HYDROCHLORIDE 2.5 MG: 5 TABLET ORAL at 04:11

## 2019-03-26 RX ADMIN — OMEPRAZOLE 20 MG: 20 CAPSULE, DELAYED RELEASE ORAL at 05:36

## 2019-03-26 RX ADMIN — CEFEPIME 2 G: 2 INJECTION, POWDER, FOR SOLUTION INTRAVENOUS at 05:39

## 2019-03-26 RX ADMIN — POTASSIUM CHLORIDE 40 MEQ: 1500 TABLET, EXTENDED RELEASE ORAL at 18:44

## 2019-03-26 RX ADMIN — POTASSIUM CHLORIDE 40 MEQ: 1500 TABLET, EXTENDED RELEASE ORAL at 05:35

## 2019-03-26 RX ADMIN — GADOBUTROL 7.5 ML: 604.72 INJECTION INTRAVENOUS at 17:05

## 2019-03-26 RX ADMIN — FUROSEMIDE 40 MG: 40 TABLET ORAL at 05:39

## 2019-03-26 RX ADMIN — NYSTATIN: 100000 POWDER TOPICAL at 05:36

## 2019-03-26 RX ADMIN — GABAPENTIN 300 MG: 300 CAPSULE ORAL at 05:35

## 2019-03-26 RX ADMIN — IBUPROFEN 600 MG: 400 TABLET, FILM COATED ORAL at 22:39

## 2019-03-26 RX ADMIN — MORPHINE SULFATE 2 MG: 4 INJECTION INTRAVENOUS at 19:47

## 2019-03-26 RX ADMIN — FUROSEMIDE 40 MG: 40 TABLET ORAL at 18:44

## 2019-03-26 RX ADMIN — HALOPERIDOL LACTATE 5 MG: 5 INJECTION, SOLUTION INTRAMUSCULAR at 22:26

## 2019-03-26 RX ADMIN — OXYCODONE HYDROCHLORIDE 2.5 MG: 5 TABLET ORAL at 22:40

## 2019-03-26 RX ADMIN — GABAPENTIN 300 MG: 300 CAPSULE ORAL at 18:44

## 2019-03-26 RX ADMIN — LABETALOL HCL 100 MG: 200 TABLET, FILM COATED ORAL at 04:11

## 2019-03-26 ASSESSMENT — ENCOUNTER SYMPTOMS
NERVOUS/ANXIOUS: 0
INSOMNIA: 0
CONSTIPATION: 0
PALPITATIONS: 0
SENSORY CHANGE: 0
WEAKNESS: 0
WEAKNESS: 1
PHOTOPHOBIA: 0
DEPRESSION: 0
NAUSEA: 0
VOMITING: 0
SORE THROAT: 0
DIARRHEA: 0
CLAUDICATION: 0
BACK PAIN: 1
BLURRED VISION: 0
HEARTBURN: 0
SHORTNESS OF BREATH: 0
WHEEZING: 0
CHILLS: 0
FEVER: 0
HEADACHES: 0
ABDOMINAL PAIN: 0
COUGH: 0
MYALGIAS: 1
SPEECH CHANGE: 0
DIZZINESS: 0

## 2019-03-26 ASSESSMENT — COGNITIVE AND FUNCTIONAL STATUS - GENERAL
PERSONAL GROOMING: A LOT
HELP NEEDED FOR BATHING: A LOT
DAILY ACTIVITIY SCORE: 13
SUGGESTED CMS G CODE MODIFIER DAILY ACTIVITY: CL
TOILETING: A LOT
DRESSING REGULAR LOWER BODY CLOTHING: A LOT
EATING MEALS: A LITTLE
DRESSING REGULAR UPPER BODY CLOTHING: A LOT

## 2019-03-26 NOTE — THERAPY
"Occupational Therapy Treatment completed with focus on ADLs and ADL transfers.  Functional Status:  MaxA supine>sit, ModA scooting to EOB, SPV eating, ModA LB dress (socks), Francesca sit>stand, Max A stand pivot txf to transport chair,   Plan of Care: Will benefit from Occupational Therapy 3 times per week  Discharge Recommendations:  Equipment Will Continue to Assess for Equipment Needs. Post-acute therapy Recommend inpatient transitional care services for continued occupational therapy services.     See \"Rehab Therapy-Acute\" Patient Summary Report for complete documentation.     Pt was supine in bed attempting to eat a cookie upon OT arrival. Pt educated regarding safe eating posture, agreeable to sit up at EOB. Pt req ModA for sock don task with max 1-step verbal cues required throughout session 2/2 cognitive impairment that impacts task initiation and sequencing. Pt moved sit>stand with Francesca however required MaxAx2 for stand pivot txf d/t balance, motor planning, and sequencing deficits. Session ended 2/2 transport arrival to take pt to MRI.   "

## 2019-03-26 NOTE — PROGRESS NOTES
Assumed care of patient. Patient A/O x 3 - disoriented to time, sitting up in bed eating dinner. Patient refused to get up to chair for meal. R midline in place - CDI -  NS running at 75 mL/hr. Discussed POC for night with patient at bedside. No complaints of pain or nausea at this time. Patient is schedule to receive MRI tomorrow. Call light and personal belongings within reach.    /75   Pulse 95   Temp 37 °C (98.6 °F) (Oral)   Resp 18   Ht 1.524 m (5')   Wt 62.9 kg (138 lb 10.7 oz)   SpO2 93%   Breastfeeding? No   BMI 27.08 kg/m²

## 2019-03-26 NOTE — THERAPY
Occupational Therapy Contact Note:    Attempted to see pt for OT tx, pt was busy eating lunch and had not eaten much of her past few meals. Will round back as able.     Jaimee Fuentes OTR/L  Pager: [phone]

## 2019-03-26 NOTE — PROGRESS NOTES
Infectious Disease Progress Note    Author: Brooke Sosa M.D. Date & Time of service: 3/26/2019  3:22 PM    Chief Complaint:  Multiple brain lesions  Iliopsoas lesion    Interval History:  68-year-old female with history of diabetes and breast cancer, admitted 3/8/2019 with abdominal pain, iliopsoas lesion, and an abnormal MRI with multiple brain lesions.  3/14 AF (99.2) WBC 5.8 more awake today as compared to yesterday but not oriented-denies pain, following commands. ECHO reviewed  3/15 AF (99.1) no WBC today Had CHAS today  3/16 temp 100.5 WBC 7.9 more confused today-naked but pushing off sheets  3/17 AF WBC not done Less obtunded today-not oriented. Denies pain, N/V, HA, visual changes etc  3/18 afebrile WBC 6 patient sitting up in bed eating breakfast, she is quite soft spoken.  She denies any headache, nausea or vomiting.  Endorses a mild cough.  3/19 AF pt denies any new symptoms, no HA. Brain lesions too small for biopsy per notes. Neurosurgery recommends repeating scan in 2 weeks.  3/20 afebrile patient is sleeping and difficult to arouse  3/21 patient is sitting up in bed and is alert and awake.  She however appears to have some waxing confusion but no she is in the hospital in [place].  Complains of lower back pain but no headache, nausea or vomiting  3/22 AF no CBC, continues to have bowel incontinence but not watery per RN, pt has no new complaints today, not engaged in conversation  3/23 afebrile WBC 6.3 patient complaining of stool incontinence and diarrhea, midline placed yesterday  3/24 afebrile patient rested comfortably without any new complaints  3/25 T- max 98.5, no new labs since 03/23. Reports mild HA and weakness. Emotional when asked about hx of hip fx.  3/26 afebrile, wbc 8.0. Reports feeling agitated. Per RN appeared confused throughout the night    Labs Reviewed    Review of Systems:  Review of Systems   Constitutional: Negative for chills and fever.   Respiratory: Negative for cough and  wheezing.    Cardiovascular: Negative for chest pain and palpitations.   Gastrointestinal: Negative for abdominal pain, nausea and vomiting.   Genitourinary: Negative for dysuria.   Musculoskeletal: Positive for back pain.   Neurological: Positive for weakness. Negative for dizziness and headaches.   Limited review of systems secondary to intermittent confusion    Hemodynamics:  Temp (24hrs), Av.7 °C (98.1 °F), Min:36.2 °C (97.2 °F), Max:37.1 °C (98.7 °F)  Temperature: 37.1 °C (98.7 °F)  Pulse  Av.3  Min: 70  Max: 153  Blood Pressure : 138/69       Physical Exam:  Physical Exam   Constitutional: She is oriented to person, place, and time. No distress.   HENT:   Head: Normocephalic and atraumatic.   Mouth/Throat: Oropharynx is clear and moist. No oropharyngeal exudate.   Eyes: Pupils are equal, round, and reactive to light. EOM are normal. Right eye exhibits no discharge. Left eye exhibits no discharge. No scleral icterus.   Neck: Neck supple. No JVD present.   Cardiovascular: Normal rate and intact distal pulses.    Murmur heard.  Pulmonary/Chest: Effort normal. No stridor. No respiratory distress. She has no wheezes.   Abdominal: Soft. Bowel sounds are normal. She exhibits no distension. There is no tenderness.   Musculoskeletal: She exhibits no tenderness or deformity.   Lymphadenopathy:     She has no cervical adenopathy.   Neurological: She is alert and oriented to person, place, and time.   Moving all extremities   Skin: Skin is warm. No rash noted. She is not diaphoretic.   Well-healed left breast mastectomy scar   Psychiatric:   Intermittently confused.   Nursing note and vitals reviewed.      Meds:    Current Facility-Administered Medications:   •  meropenem  •  MD Alert...Vancomycin per Pharmacy  •  vancomycin  •  nystatin  •  NS  •  labetalol  •  ibuprofen  •  potassium chloride SA  •  haloperidol lactate  •  enoxaparin (LOVENOX) injection  •  pramipexole  •  furosemide  •  tramadol  •   losartan  •  gabapentin  •  omeprazole  •  Respiratory Care per Protocol  •  Respiratory Care per Protocol  •  acetaminophen  •  Notify provider if pain remains uncontrolled **AND** Use the numeric rating scale (NRS-11) on regular floors and Critical-Care Pain Observation Tool (CPOT) on ICUs/Trauma to assess pain **AND** Pulse Ox (Oximetry) **AND** Pharmacy Consult Request **AND** If patient difficult to arouse and/or has respiratory depression, stop any opiates that are currently infusing and call a Rapid Response. **AND** oxyCODONE immediate-release **AND** [DISCONTINUED] oxyCODONE immediate-release **AND** morphine injection  •  ondansetron  •  ondansetron    Labs:  Recent Labs      03/26/19   0930   WBC  8.0   RBC  4.62   HEMOGLOBIN  11.5*   HEMATOCRIT  37.4   MCV  81.0*   MCH  24.9*   RDW  51.0*   PLATELETCT  193   MPV  10.2   NEUTSPOLYS  73.60*   LYMPHOCYTES  11.50*   MONOCYTES  11.30   EOSINOPHILS  2.60   BASOPHILS  0.60     Recent Labs      03/26/19   0930   SODIUM  134*   POTASSIUM  4.2   CHLORIDE  99   CO2  27   GLUCOSE  116*   BUN  22     Recent Labs      03/26/19   0930   CREATININE  0.87       Imaging:  Ct-needle Bx-muscle Right    Result Date: 3/13/2019  3/11/2019 4:44 PM HISTORY/REASON FOR EXAM:  Right gluteal hypodense lesion. Moderate sedation was administered with continuous monitoring of the patient under the direct supervision of  Medications: 2 mg of Versed and 200 mcg of fentanyl Total sedation time 60 minutes Procedure: After the informed consent the patient was placed on the CT table on prone position. Localization CT scan was obtained. It demonstrates hypodense area in the right gluteus muscle. Subsequently a 17-gauge needle was advanced into the lesion. 20  mL of pus was aspirated. The materials were sent to the pathology. The patient tolerated the procedure without any immediate competition.     CT-guided aspiration of pus like material in the right gluteal muscle  lesion.    Ec-echocardiogram Complete W/o Cont    Result Date: 3/13/2019  Transthoracic Echo Report Echocardiography Laboratory CONCLUSIONS No prior study is available for comparison. Normal left ventricular systolic function. Left ventricular ejection fraction is visually estimated to be 65%. Normal diastolic function. Normal inferior vena cava size and inspiratory collapse. Aortic sclerosis without stenosis. Estimated right ventricular systolic pressure  is 33 mmHg. No obvious valvular vegetations are noted on a decent quality study.  If further valvular assessment is clinically indicated, consider transesophageal echocardiogram. SAM,  LV EF:  65    % FINDINGS Left Ventricle Normal left ventricular size and systolic function. Normal left ventricular wall thickness. Left ventricular ejection fraction is visually estimated to be 65%. Normal diastolic function. Right Ventricle Normal right ventricular size and systolic function. Right Atrium Normal right atrial size. Normal inferior vena cava size and inspiratory collapse. Left Atrium Normal left atrial size. Mitral Valve Structurally normal mitral valve without significant stenosis or regurgitation. Aortic Valve Aortic sclerosis without stenosis. No aortic insufficiency. Tricuspid Valve Structurally normal tricuspid valve. Mild tricuspid regurgitation. Right atrial pressure is estimated to be 3 mmHg. Estimated right ventricular systolic pressure  is 33 mmHg. Pulmonic Valve The pulmonic valve is not well visualized. No pulmonic insufficiency. Pericardium Normal pericardium without effusion. Aorta The aortic root is normal.  Ascending aorta diameter is 3.0 cm. Claire Tripathi MD (Electronically Signed) Final Date:     13 March 2019                 14:38      Micro:  Results     Procedure Component Value Units Date/Time    URINALYSIS [[id number]]  (Abnormal) Collected:  03/23/19 2174    Order Status:  Completed Specimen:  Urine from Urine, Cath Updated:  03/23/19 2664      Color Yellow     Character Sl Cloudy (A)     Specific Gravity 1.014     Ph 6.0     Glucose Negative mg/dL      Ketones Negative mg/dL      Protein 30 (A) mg/dL      Bilirubin Negative     Urobilinogen, Urine 0.2     Nitrite Negative     Leukocyte Esterase Trace (A)     Occult Blood Trace (A)     Micro Urine Req Microscopic    Narrative:       Collected By:[id number] MARSHA ARIANNA CENTENOAshley          Assessment:  Active Hospital Problems    Diagnosis   • *Lesion of brain [G93.9]   • Mass of iliopsoas muscle group [M62.89]   • Encephalopathy [G93.40]   • Hyponatremia [E87.1]   • History of breast cancer [Z85.3]   • Diabetes mellitus type 2 in obese (HCC) [E11.69, E66.9]       Plan:  Multiple brain lesions  Low-grade fevers resolved  No current leukocytosis. Last lab 03/23  AMS-waxing and waning, but improved  MRI with scattered small lesions incl aaron, too small to biopsy per neurosurgery  TTE neg; CHAS neg  Bcxs neg to date  ID and neurosurgery recommends repeating imaging in 2-3 weeks  Repeat MRI prior to completion of abx  Continue to monitor    Iliopsoas mass, on treatment  CT + 2.6 cm lesion in the right iliopsoas region as well as a 2.8x2.1 cm lesion in the right gluteal musculature   S/p aspiration +WBCs-cultures negative to date  Bcxs remain neg  D/c'd Vanco secondary to elevated Vanco troughs 03/23 and patient not clearing well, restarted 03/25  D/c cefepime - may be the cause of confusion, start Meropenem. QTc 414  Anticipate 14 day course Stop date 3/28/19  Midline pending  Order MRI w/ contrast lumbar, pelvis (hips bilat - cancelled due to pt not being able to stand).     Diarrhea, persistent  DC stool softeners 3/23  If continues or worsens, check C. difficile PCR    H/o breast cancer  Markedly elevated alk phos  Recommend continued diaz for mets    Type 2 diabetes mellitus  Hemoglobin A1c 6.3  Keep BS under 150 to help control current infection    I have performed a physical exam and reviewed and updated ROS and plan  today 3/26/2019.  In review of yesterday's note 3/25/2019, there are no changes except as documented above.    Disposition: SNF in [place].  However patient will complete her course of antibiotics in the hospital prior to transfer.  Recommend she has repeat imaging done prior to transfer as well    Discussed with attending Dr. Johnson

## 2019-03-26 NOTE — PROGRESS NOTES
Pharmacy Kinetics 68 y.o. female on vancomycin day # 2 3/26/2019    Indication for Treatment: possible CNS infection     Pertinent history per medical record: Admitted on 3/8/2019 for iliopsoas mass and multiple brain lesions.  There is concern that this is infection.  All cultures are negative to date, but were drawn will on pip/tazo.  CHAS was negative. ID is following. Patient has a history of breast cancer and DM.      Other antibiotics: meropenem 2 gm IV q12h     Allergies: Patient has no known allergies.      List concerns for renal function: elevated BUN/SCr ratio, age     Pertinent cultures to date:   3/11/19 wound - right gluteal mass: negative  3/12/19 blood - peripheral x 2: no growth    Recent Labs      19   0930   WBC  8.0   NEUTSPOLYS  73.60*     Recent Labs      19   0930   BUN  22   CREATININE  0.87     No results for input(s): VANCOTROUGH, VANCOPEAK, VANCORANDOM in the last 72 hours.No intake or output data in the 24 hours ending 19 1512   Blood pressure 138/69, pulse 89, temperature 37.1 °C (98.7 °F), temperature source Temporal, resp. rate 18, height 1.524 m (5'), weight 62.9 kg (138 lb 10.7 oz), SpO2 93 %, not currently breastfeeding. Temp (24hrs), Av.7 °C (98.1 °F), Min:36.2 °C (97.2 °F), Max:37.1 °C (98.7 °F)      A/P   1. Vancomycin dose change: not indicated  2. Next vancomycin level: ~3/28/19 prior to 4th total dose (not yet ordered)  3. Goal trough: 12-16 mcg/mL  4. Comments: cefepime changed to meropenem today per ID. Renal function okay. Will get a trough at steady state, CTM.    Trinity Pearce, BariD, BCOP

## 2019-03-26 NOTE — PROGRESS NOTES
"[facility] Medicine Daily Progress Note    Date of Service  3/26/2019    Chief Complaint  68 y.o. female admitted 3/8/2019 with right hip pain, fever and abnormal brain mri.    Hospital Course    Patient started on empiric antibiotics given fever.  She had abnormal findings on MRI brain and CT showed \"lesion\" on right iliopsoas.  This lesion was biopsied and turned out to be abscess.  She has history of breast cancer and there is also concern of brain lesions being metastatic though their appearance is not typical of this.      Interval Problem Update  3/12 Discussed with Dr Turner for second opinion of brain lesions and based on appearance not able to r/o or r/i any diagnosis.  Given patient's presentation of confusion, hip pain and fever  - this is more supportive of infectious etiology rather than malignant.  Fluid from right gluteal area sent to micro and as of yet - no growth.  Continue zosyn for now.  3/13 Patient with slight improvement in mentation.  Blood cultures and abscess cultures are showing no growth at this time.  TTE being done while I examined patient - no evidence of vegetations on this test.  ID consultation pending - d/w Dr Garcia.  3/14 Patient feeling well today, her pain is present but not as bad as prior to admission.  She was able to follow the conversation today and is agreeable to move forward with the CHAS tomorrow.  I spoke with her brother, René, and updated him on condition yesterday afternoon also.  Will also have PICC placed in next few days as long as blood cultures remain negative as I expect a prolonged antibiotic course of treatment.  3/15 Patient without new complaints, states she needs help to move in bed.  CHAS showed no evidence of vegetations and regular diet resumed.  Culture remains negative and biopsy of brain lesion may prove needed - will watch through the weekend and if mentation does not continue to improve, will discuss with neurosurgery on Monday.  3/16 Patient states " she is okay today, mentation has waxed and waned without significant improvement.  She was febrile earlier today.  No other acute events.  3/17 Patient with significant improvement in mentation today, she is aware of her hospitalization, not falling asleep mid sentence.  Her pain mediations were held most of yesterday and likely far too strong for patient and were affecting her awareness.  Oxycodone 5 discontinued and will use tramadol instead unless pain not well controlled.  CT brain with contrast ordered as a quick scan for comparison to MRI.    3/18 Patient feeling same today, discussed need to discuss with neurosurgery for possible biopsy.  Discussed with Dr Nguyen, he reviewed MRI and CT brain and he feels they are likely metastatic lesions but are far too small to biopsy.  His recommendation is to continue with antibiotics and re-image in 2 weeks to see if any change that would be amenable to biopsy or that would further declare infection.    3/26 Patient mental status continues to wax and wane.  ID ordered MRI lumbar, pelvis and sacrum for evaluation for source of infection, patient with continued low back pain and no real improvement since I saw her 7 days prior with continued antibiotics during this time.  Cultures have not growth pathogen, repeat MRI 3/28.     Consultants/Specialty  ID - de    Code Status  full    Disposition  SNF    Review of Systems  Review of Systems   Constitutional: Negative for chills and fever.   HENT: Negative for congestion and sore throat.    Eyes: Negative for blurred vision and photophobia.   Respiratory: Negative for cough and shortness of breath.    Cardiovascular: Negative for chest pain, claudication and leg swelling.   Gastrointestinal: Negative for abdominal pain, constipation, diarrhea, heartburn, nausea and vomiting.   Genitourinary: Negative for dysuria and hematuria.   Musculoskeletal: Positive for back pain and myalgias. Negative for joint pain.   Skin: Negative  for itching and rash.   Neurological: Negative for dizziness, sensory change, speech change, weakness and headaches.   Psychiatric/Behavioral: Negative for depression. The patient is not nervous/anxious and does not have insomnia.         Physical Exam  Temp:  [36.2 °C (97.2 °F)-37.1 °C (98.7 °F)] 37.1 °C (98.7 °F)  Pulse:  [78-98] 89  Resp:  [18-20] 18  BP: (114-179)/(59-90) 138/69  SpO2:  [93 %-98 %] 93 %    Physical Exam   Constitutional: She is oriented to person, place, and time. She appears well-developed and well-nourished. No distress.   HENT:   Head: Normocephalic and atraumatic.   Eyes: Conjunctivae are normal. No scleral icterus.   Neck: Neck supple. No JVD present.   Cardiovascular: Normal rate, regular rhythm and normal heart sounds.  Exam reveals no gallop and no friction rub.    No murmur heard.  Pulmonary/Chest: Effort normal and breath sounds normal. No respiratory distress. She exhibits no tenderness.   Abdominal: Soft. Bowel sounds are normal. She exhibits no distension. There is no guarding.   Musculoskeletal: She exhibits no edema or tenderness.   Lymphadenopathy:     She has no cervical adenopathy.   Neurological: She is alert and oriented to person, place, and time. No cranial nerve deficit.   Skin: Skin is warm and dry. She is not diaphoretic. No erythema. No pallor.   Psychiatric: She has a normal mood and affect. Her behavior is normal.   Nursing note and vitals reviewed.      Fluids  No intake or output data in the 24 hours ending 03/26/19 1551    Laboratory  Recent Labs      03/26/19   0930   WBC  8.0   RBC  4.62   HEMOGLOBIN  11.5*   HEMATOCRIT  37.4   MCV  81.0*   MCH  24.9*   MCHC  30.7*   RDW  51.0*   PLATELETCT  193   MPV  10.2     Recent Labs      03/26/19   0930   SODIUM  134*   POTASSIUM  4.2   CHLORIDE  99   CO2  27   GLUCOSE  116*   BUN  22   CREATININE  0.87   CALCIUM  10.7*                   Imaging  IR-MIDLINE CATHETER INSERTION WO GUIDANCE > AGE 3   Final Result                   Ultrasound-guided midline placement performed by qualified nursing staff    as above.          CT-HEAD WITH   Final Result      Multiple subcentimeter too numerous to count enhancing masses scattered throughout the supratentorial and infratentorial brain. These demonstrate some surrounding vasogenic edema and most likely represent multifocal metastatic lesions. Other    considerations would include multifocal abscesses or septic emboli.      EC-CHAS W/O CONT   Final Result      EC-CHAS W/O CONT   Final Result      CT-NEEDLE BX-MUSCLE RIGHT   Final Result      CT-guided aspiration of pus like material in the right gluteal muscle lesion.      EC-ECHOCARDIOGRAM COMPLETE W/O CONT   Final Result      OUTSIDE IMAGES-CT CHEST/ABDOMEN/PELVIS   Final Result      OUTSIDE IMAGES-DX CHEST   Final Result      [id number] FILM X-RAY   Final Result      OUTSIDE IMAGES-MR BRAIN   Final Result      OUTSIDE IMAGES-MR BRAIN   Final Result      MR-PELVIS WITH    (Results Pending)   MR-LUMBAR SPINE-WITH    (Results Pending)        Assessment/Plan  * Lesion of brain   Assessment & Plan    Metastatic cancer vs infectious etiology  D/w Dr Black for questionable brain mets, recommending IR Bx of psoas muscle   IR Bx of psoas muscle ordered - was abscess  Empiric treatment of infection  TTE and CHAS negative for vegetations.  D/w Dr Nguyen - lesions likely metastatic based on his interpretation of MRI/CT - too small to biopsy, recommends continuing antibiotics and re-imaging in 2 weeks to see if there has been any change.  MRI brain to be repeated once she completes abx on 3/28         Encephalopathy   Assessment & Plan    Likely toxic metabolic from infection  Waxes and wanes         Mass of iliopsoas muscle group   Assessment & Plan    IR Bx of psoas muscle completed, fluid drained - abscess, no growth  Was on linezolid and cefepime and now on vanco/merrem -  last dose planned 3/28/19  ID consulting - d/w Dr Johnson          Normocytic anemia   Assessment & Plan    stable     RLS (restless legs syndrome)- (present on admission)   Assessment & Plan    Pramipexole       Diabetes mellitus type 2 in obese (HCC)- (present on admission)   Assessment & Plan    Well controlled   Short acting insulin as needed   Accu-Checks, hypoglycemia protocol          History of breast cancer- (present on admission)   Assessment & Plan    Mets vs infection to brain  No known active breast cancer.       COPD (chronic obstructive pulmonary disease) (HCC)- (present on admission)   Assessment & Plan    Oxygen as needed, Respiratory protocol, Bronchodilators, Incentive spirometry      Urinary tract infection   Assessment & Plan    Culture remain no growth.       Fungal infection of the groin   Assessment & Plan    Started on nystatin powder 3/24     History of deep vein thrombosis- (present on admission)   Assessment & Plan    History of deep vein thrombosis   Was on Rivaroxaban as outpatient.     Held for Bx Mar 11  Restart AC once repeat CT/MRI head is done after completing abx treatment         Essential hypertension- (present on admission)   Assessment & Plan    Restarted on losartan and furosemide with hold parameters.     GERD (gastroesophageal reflux disease)- (present on admission)   Assessment & Plan    Omeprazole     Chronic pain- (present on admission)   Assessment & Plan     Multifactorial  Likely secondary to abscess, fracture  Pain control            VTE prophylaxis: scds

## 2019-03-27 PROCEDURE — 700105 HCHG RX REV CODE 258: Performed by: INTERNAL MEDICINE

## 2019-03-27 PROCEDURE — 700102 HCHG RX REV CODE 250 W/ 637 OVERRIDE(OP): Performed by: HOSPITALIST

## 2019-03-27 PROCEDURE — 770004 HCHG ROOM/CARE - ONCOLOGY PRIVATE *

## 2019-03-27 PROCEDURE — A9270 NON-COVERED ITEM OR SERVICE: HCPCS | Performed by: INTERNAL MEDICINE

## 2019-03-27 PROCEDURE — 99232 SBSQ HOSP IP/OBS MODERATE 35: CPT | Mod: GC | Performed by: INTERNAL MEDICINE

## 2019-03-27 PROCEDURE — A9270 NON-COVERED ITEM OR SERVICE: HCPCS | Performed by: HOSPITALIST

## 2019-03-27 PROCEDURE — 700102 HCHG RX REV CODE 250 W/ 637 OVERRIDE(OP): Performed by: INTERNAL MEDICINE

## 2019-03-27 PROCEDURE — 97530 THERAPEUTIC ACTIVITIES: CPT

## 2019-03-27 PROCEDURE — 700111 HCHG RX REV CODE 636 W/ 250 OVERRIDE (IP): Performed by: HOSPITALIST

## 2019-03-27 PROCEDURE — 700111 HCHG RX REV CODE 636 W/ 250 OVERRIDE (IP): Performed by: INTERNAL MEDICINE

## 2019-03-27 PROCEDURE — 99233 SBSQ HOSP IP/OBS HIGH 50: CPT | Performed by: INTERNAL MEDICINE

## 2019-03-27 RX ORDER — LORAZEPAM 2 MG/ML
0.5 INJECTION INTRAMUSCULAR ONCE
Status: COMPLETED | OUTPATIENT
Start: 2019-03-27 | End: 2019-03-27

## 2019-03-27 RX ADMIN — MEROPENEM 2 G: 1 INJECTION, POWDER, FOR SOLUTION INTRAVENOUS at 19:04

## 2019-03-27 RX ADMIN — MORPHINE SULFATE 2 MG: 4 INJECTION INTRAVENOUS at 01:57

## 2019-03-27 RX ADMIN — SODIUM CHLORIDE: 9 INJECTION, SOLUTION INTRAVENOUS at 03:25

## 2019-03-27 RX ADMIN — LORAZEPAM 0.5 MG: 2 INJECTION INTRAMUSCULAR; INTRAVENOUS at 06:35

## 2019-03-27 RX ADMIN — FUROSEMIDE 40 MG: 40 TABLET ORAL at 18:26

## 2019-03-27 RX ADMIN — FUROSEMIDE 40 MG: 40 TABLET ORAL at 06:11

## 2019-03-27 RX ADMIN — GABAPENTIN 300 MG: 300 CAPSULE ORAL at 18:26

## 2019-03-27 RX ADMIN — POTASSIUM CHLORIDE 40 MEQ: 1500 TABLET, EXTENDED RELEASE ORAL at 18:26

## 2019-03-27 RX ADMIN — LABETALOL HCL 100 MG: 200 TABLET, FILM COATED ORAL at 06:10

## 2019-03-27 RX ADMIN — VANCOMYCIN HYDROCHLORIDE 1000 MG: 100 INJECTION, POWDER, LYOPHILIZED, FOR SOLUTION INTRAVENOUS at 16:15

## 2019-03-27 RX ADMIN — NYSTATIN: 100000 POWDER TOPICAL at 19:04

## 2019-03-27 RX ADMIN — LOSARTAN POTASSIUM 50 MG: 50 TABLET ORAL at 06:11

## 2019-03-27 RX ADMIN — MEROPENEM 2 G: 1 INJECTION, POWDER, FOR SOLUTION INTRAVENOUS at 06:38

## 2019-03-27 RX ADMIN — NYSTATIN: 100000 POWDER TOPICAL at 05:53

## 2019-03-27 ASSESSMENT — ENCOUNTER SYMPTOMS
INSOMNIA: 0
BACK PAIN: 1
DEPRESSION: 0
COUGH: 0
HEADACHES: 1
HEADACHES: 0
PALPITATIONS: 0
SORE THROAT: 0
BLURRED VISION: 0
SENSORY CHANGE: 0
MYALGIAS: 1
WHEEZING: 0
CLAUDICATION: 0
WEAKNESS: 0
ABDOMINAL PAIN: 0
HEARTBURN: 0
NERVOUS/ANXIOUS: 0
WEAKNESS: 1
CHILLS: 0
FEVER: 0
SHORTNESS OF BREATH: 0
NAUSEA: 0
DIZZINESS: 0
VOMITING: 0
SPEECH CHANGE: 0
PHOTOPHOBIA: 0
CONSTIPATION: 0
DIARRHEA: 0

## 2019-03-27 ASSESSMENT — COGNITIVE AND FUNCTIONAL STATUS - GENERAL
SUGGESTED CMS G CODE MODIFIER MOBILITY: CM
MOVING FROM LYING ON BACK TO SITTING ON SIDE OF FLAT BED: UNABLE
MOVING TO AND FROM BED TO CHAIR: UNABLE
WALKING IN HOSPITAL ROOM: TOTAL
TURNING FROM BACK TO SIDE WHILE IN FLAT BAD: A LOT
CLIMB 3 TO 5 STEPS WITH RAILING: TOTAL
MOBILITY SCORE: 8
STANDING UP FROM CHAIR USING ARMS: A LOT

## 2019-03-27 NOTE — PROGRESS NOTES
"[facility] Medicine Daily Progress Note    Date of Service  3/27/2019    Chief Complaint  68 y.o. female admitted 3/8/2019 with right hip pain, fever and abnormal brain mri.    Hospital Course    Patient started on empiric antibiotics given fever.  She had abnormal findings on MRI brain and CT showed \"lesion\" on right iliopsoas.  This lesion was biopsied and turned out to be abscess.  She has history of breast cancer and there is also concern of brain lesions being metastatic though their appearance is not typical of this.      Interval Problem Update  3/12 Discussed with Dr Turner for second opinion of brain lesions and based on appearance not able to r/o or r/i any diagnosis.  Given patient's presentation of confusion, hip pain and fever  - this is more supportive of infectious etiology rather than malignant.  Fluid from right gluteal area sent to micro and as of yet - no growth.  Continue zosyn for now.  3/13 Patient with slight improvement in mentation.  Blood cultures and abscess cultures are showing no growth at this time.  TTE being done while I examined patient - no evidence of vegetations on this test.  ID consultation pending - d/w Dr Garcia.  3/14 Patient feeling well today, her pain is present but not as bad as prior to admission.  She was able to follow the conversation today and is agreeable to move forward with the CHAS tomorrow.  I spoke with her brother, René, and updated him on condition yesterday afternoon also.  Will also have PICC placed in next few days as long as blood cultures remain negative as I expect a prolonged antibiotic course of treatment.  3/15 Patient without new complaints, states she needs help to move in bed.  CHAS showed no evidence of vegetations and regular diet resumed.  Culture remains negative and biopsy of brain lesion may prove needed - will watch through the weekend and if mentation does not continue to improve, will discuss with neurosurgery on Monday.  3/16 Patient states " she is okay today, mentation has waxed and waned without significant improvement.  She was febrile earlier today.  No other acute events.  3/17 Patient with significant improvement in mentation today, she is aware of her hospitalization, not falling asleep mid sentence.  Her pain mediations were held most of yesterday and likely far too strong for patient and were affecting her awareness.  Oxycodone 5 discontinued and will use tramadol instead unless pain not well controlled.  CT brain with contrast ordered as a quick scan for comparison to MRI.    3/18 Patient feeling same today, discussed need to discuss with neurosurgery for possible biopsy.  Discussed with Dr Nguyen, he reviewed MRI and CT brain and he feels they are likely metastatic lesions but are far too small to biopsy.  His recommendation is to continue with antibiotics and re-image in 2 weeks to see if any change that would be amenable to biopsy or that would further declare infection.    3/26 Patient mental status continues to wax and wane.  ID ordered MRI lumbar, pelvis and sacrum for evaluation for source of infection, patient with continued low back pain and no real improvement since I saw her 7 days prior with continued antibiotics during this time.  Cultures have not growth pathogen, repeat MRI 3/28.   3/27 Patient somnolent most of the day today, MRI's completed and showing 2 areas of fluid collections, given her history are likely abscesses and will require drainage, CT guided drainage requested and patient NPO at [facility] for this.  D/w ID - cultures will be done on fluid collection.  Repeat CT brain ordered for tomorrow.    Consultants/Specialty  ID - santino/Alex    Code Status  full    Disposition  SNF in [place] after completion of abx.    Review of Systems  Review of Systems   Constitutional: Negative for chills and fever.   HENT: Negative for congestion and sore throat.    Eyes: Negative for blurred vision and photophobia.   Respiratory:  Negative for cough and shortness of breath.    Cardiovascular: Negative for chest pain, claudication and leg swelling.   Gastrointestinal: Negative for abdominal pain, constipation, diarrhea, heartburn, nausea and vomiting.   Genitourinary: Negative for dysuria and hematuria.   Musculoskeletal: Positive for back pain and myalgias. Negative for joint pain.   Skin: Negative for itching and rash.   Neurological: Negative for dizziness, sensory change, speech change, weakness and headaches.   Psychiatric/Behavioral: Negative for depression. The patient is not nervous/anxious and does not have insomnia.         Physical Exam  Temp:  [36.6 °C (97.8 °F)-37.8 °C (100 °F)] 37.7 °C (99.8 °F)  Pulse:  [] 111  Resp:  [18-21] 19  BP: (133-196)/() 150/82  SpO2:  [94 %-99 %] 94 %    Physical Exam   Constitutional: She is oriented to person, place, and time. She appears well-developed and well-nourished. No distress.   HENT:   Head: Normocephalic and atraumatic.   Eyes: Conjunctivae are normal. No scleral icterus.   Neck: Neck supple. No JVD present.   Cardiovascular: Normal rate, regular rhythm and normal heart sounds.  Exam reveals no gallop and no friction rub.    No murmur heard.  Pulmonary/Chest: Effort normal and breath sounds normal. No respiratory distress. She exhibits no tenderness.   Abdominal: Soft. Bowel sounds are normal. She exhibits no distension. There is no guarding.   Musculoskeletal: She exhibits no edema or tenderness.   Lymphadenopathy:     She has no cervical adenopathy.   Neurological: She is alert and oriented to person, place, and time. No cranial nerve deficit.   Skin: Skin is warm and dry. She is not diaphoretic. No erythema. No pallor.   Psychiatric: She has a normal mood and affect. Her behavior is normal.   Nursing note and vitals reviewed.      Fluids  No intake or output data in the 24 hours ending 03/27/19 1642    Laboratory  Recent Labs      03/26/19   0930   WBC  8.0   RBC  4.62    HEMOGLOBIN  11.5*   HEMATOCRIT  37.4   MCV  81.0*   MCH  24.9*   MCHC  30.7*   RDW  51.0*   PLATELETCT  193   MPV  10.2     Recent Labs      03/26/19   0930   SODIUM  134*   POTASSIUM  4.2   CHLORIDE  99   CO2  27   GLUCOSE  116*   BUN  22   CREATININE  0.87   CALCIUM  10.7*                   Imaging  MR-LUMBAR SPINE-WITH & W/O   Final Result      1.  There are multifocal enhancing fluid collections in the right iliopsoas muscles and gluteus muscles adjacent to the right ilium. Right iliopsoas fluid collection measures an approximately 3.9 x 2.9 cm. The right gluteal fluid collection measures an    approximately 3.5 x 2.8 cm in size. The differential diagnosis includes postsurgical seroma and abscess.   2.  Post operative and degenerative changes as described above.      MR-PELVIS-WITH & W/O AND SEQUENCES   Final Result      1.  Surgical screw traverses both sacroiliac joint across presumed pathologic fracture of the right sacrum and the hardware obscures the adjacent tissues.      2.  No other evidence for bony metastatic disease      3.  Right gluteal musculature rim-enhancing fluid collection measuring 3.4 x 2.8 cm. This could be a postoperative seroma versus abscess      4.  osteoarthritis of both hip joint      5.  Prior hysterectomy      IR-MIDLINE CATHETER INSERTION WO GUIDANCE > AGE 3   Final Result                  Ultrasound-guided midline placement performed by qualified nursing staff    as above.          CT-HEAD WITH   Final Result      Multiple subcentimeter too numerous to count enhancing masses scattered throughout the supratentorial and infratentorial brain. These demonstrate some surrounding vasogenic edema and most likely represent multifocal metastatic lesions. Other    considerations would include multifocal abscesses or septic emboli.      EC-CHAS W/O CONT   Final Result      EC-CHAS W/O CONT   Final Result      CT-NEEDLE BX-MUSCLE RIGHT   Final Result      CT-guided aspiration of pus like  material in the right gluteal muscle lesion.      EC-ECHOCARDIOGRAM COMPLETE W/O CONT   Final Result      OUTSIDE IMAGES-CT CHEST/ABDOMEN/PELVIS   Final Result      OUTSIDE IMAGES-DX CHEST   Final Result      [id number] FILM X-RAY   Final Result      OUTSIDE IMAGES-MR BRAIN   Final Result      OUTSIDE IMAGES-MR BRAIN   Final Result      CT-UNLISTED CT PROCEDURE    (Results Pending)        Assessment/Plan  * Lesion of brain   Assessment & Plan    Metastatic cancer vs infectious etiology  D/w Dr Black for questionable brain mets, recommending IR Bx of psoas muscle   IR Bx of psoas muscle ordered - was abscess  Empiric treatment of infection  TTE and CHAS negative for vegetations.  D/w Dr Nguyen - lesions likely metastatic based on his interpretation of MRI/CT - too small to biopsy, recommends continuing antibiotics and re-imaging in 2 weeks to see if there has been any change.  MRI brain to be repeated once she completes abx on 3/28         Encephalopathy   Assessment & Plan    Likely toxic metabolic from infection  Waxes and wanes         Mass of iliopsoas muscle group   Assessment & Plan    Return of fluid collection, repeat drainage in IR with cultures, NPO p MN    Was on linezolid and cefepime and now on vanco/merrem -  last dose planned 3/28/19  ID consulting - d/w Dr Johnson         Normocytic anemia   Assessment & Plan    stable     RLS (restless legs syndrome)- (present on admission)   Assessment & Plan    Pramipexole       Diabetes mellitus type 2 in obese (HCC)- (present on admission)   Assessment & Plan    Well controlled   Short acting insulin as needed   Accu-Checks, hypoglycemia protocol          History of breast cancer- (present on admission)   Assessment & Plan    Mets vs infection to brain  No known active breast cancer.       COPD (chronic obstructive pulmonary disease) (HCC)- (present on admission)   Assessment & Plan    Oxygen as needed, Respiratory protocol, Bronchodilators, Incentive  spirometry      Urinary tract infection   Assessment & Plan    Culture remain no growth.       Fungal infection of the groin   Assessment & Plan    Started on nystatin powder 3/24     History of deep vein thrombosis- (present on admission)   Assessment & Plan    History of deep vein thrombosis   Was on Rivaroxaban as outpatient.     Held for Bx Mar 11  Restart AC once repeat CT/MRI head is done after completing abx treatment         Essential hypertension- (present on admission)   Assessment & Plan    Restarted on losartan and furosemide with hold parameters.     GERD (gastroesophageal reflux disease)- (present on admission)   Assessment & Plan    Omeprazole     Chronic pain- (present on admission)   Assessment & Plan     Multifactorial  Likely secondary to abscess, fracture  Pain control            VTE prophylaxis: scds

## 2019-03-27 NOTE — PROGRESS NOTES
Patient is A/O x 3, disoriented to time. Patient has a delayed response when asked questions. Patient sitting up in chair eating dinner. No complaints of pain or discomfort at this time. Patient expressed need to go to bathroom. Assisted patient to bed side commode with BRANDI Collins and back to bed. Patient unsteady, does not follow verbal directions easily, and tires quickly. Discussed POC for night with patient at bedside and oriented patient to use of call light. Bed in lowest, locked position. Call light within reach. Bed alarm on.     /74   Pulse (!) 101   Temp 36.6 °C (97.8 °F) (Temporal)   Resp 18   Ht 1.524 m (5')   Wt 62.9 kg (138 lb 10.7 oz)   SpO2 97%   Breastfeeding? No   BMI 27.08 kg/m²      0615: Patient vital signs as listed: /123, RR 19,  - gave patient 100mg tablet Labetalol per MAR. Patient restless. Neuro exam normal. - Dr Valdes updated on patient condition.

## 2019-03-27 NOTE — PROGRESS NOTES
Infectious Disease Progress Note    Author: Brooke Sosa M.D. Date & Time of service: 3/27/2019  11:44 AM    Chief Complaint:  Multiple brain lesions  Iliopsoas lesion    Interval History:  68-year-old female with history of diabetes and breast cancer, admitted 3/8/2019 with abdominal pain, iliopsoas lesion, and an abnormal MRI with multiple brain lesions.  3/14 AF (99.2) WBC 5.8 more awake today as compared to yesterday but not oriented-denies pain, following commands. ECHO reviewed  3/15 AF (99.1) no WBC today Had CHAS today  3/16 temp 100.5 WBC 7.9 more confused today-naked but pushing off sheets  3/17 AF WBC not done Less obtunded today-not oriented. Denies pain, N/V, HA, visual changes etc  3/18 afebrile WBC 6 patient sitting up in bed eating breakfast, she is quite soft spoken.  She denies any headache, nausea or vomiting.  Endorses a mild cough.  3/19 AF pt denies any new symptoms, no HA. Brain lesions too small for biopsy per notes. Neurosurgery recommends repeating scan in 2 weeks.  3/20 afebrile patient is sleeping and difficult to arouse  3/21 patient is sitting up in bed and is alert and awake.  She however appears to have some waxing confusion but no she is in the hospital in [place].  Complains of lower back pain but no headache, nausea or vomiting  3/22 AF no CBC, continues to have bowel incontinence but not watery per RN, pt has no new complaints today, not engaged in conversation  3/23 afebrile WBC 6.3 patient complaining of stool incontinence and diarrhea, midline placed yesterday  3/24 afebrile patient rested comfortably without any new complaints  3/25 T- max 98.5, no new labs since 03/23. Reports mild HA and weakness. Emotional when asked about hx of hip fx.  3/26 afebrile, wbc 8.0. Reports feeling agitated. Per RN appeared confused throughout the night  3/27 no change overnight. RN reports agitation overnight. Pt sleeping, difficult to wake this am    Labs Reviewed    Review of Systems:  Review of  Systems   Constitutional: Negative for chills and fever.   Respiratory: Negative for cough and wheezing.    Cardiovascular: Negative for chest pain and palpitations.   Gastrointestinal: Negative for abdominal pain, nausea and vomiting.   Genitourinary: Negative for dysuria.   Musculoskeletal: Positive for back pain.   Neurological: Positive for weakness and headaches. Negative for dizziness.   Limited review of systems secondary to intermittent confusion    Hemodynamics:  Temp (24hrs), Av.9 °C (98.5 °F), Min:36.6 °C (97.8 °F), Max:37.3 °C (99.1 °F)  Temperature: 37.3 °C (99.1 °F)  Pulse  Av.4  Min: 70  Max: 153  Blood Pressure : (!) 167/95       Physical Exam:  Physical Exam   Constitutional: She is oriented to person, place, and time. No distress.   HENT:   Head: Normocephalic and atraumatic.   Mouth/Throat: Oropharynx is clear and moist. No oropharyngeal exudate.   Eyes: Pupils are equal, round, and reactive to light. EOM are normal. Right eye exhibits no discharge. Left eye exhibits no discharge. No scleral icterus.   Neck: Neck supple.   Cardiovascular: Normal rate and intact distal pulses.    Murmur heard.  Pulmonary/Chest: Effort normal. No stridor. No respiratory distress. She has no wheezes.   Abdominal: Soft. Bowel sounds are normal. She exhibits no distension. There is no tenderness.   Musculoskeletal: She exhibits no tenderness or deformity.   Lymphadenopathy:     She has no cervical adenopathy.   Neurological: She is alert and oriented to person, place, and time.   Moving all extremities   Skin: Skin is warm. No rash noted. She is not diaphoretic.   Well-healed left breast mastectomy scar   Nursing note and vitals reviewed.      Meds:    Current Facility-Administered Medications:   •  meropenem  •  MD Alert...Vancomycin per Pharmacy  •  vancomycin  •  nystatin  •  NS  •  labetalol  •  ibuprofen  •  potassium chloride SA  •  haloperidol lactate  •  enoxaparin (LOVENOX) injection  •   pramipexole  •  furosemide  •  tramadol  •  losartan  •  gabapentin  •  omeprazole  •  Respiratory Care per Protocol  •  Respiratory Care per Protocol  •  acetaminophen  •  Notify provider if pain remains uncontrolled **AND** Use the numeric rating scale (NRS-11) on regular floors and Critical-Care Pain Observation Tool (CPOT) on ICUs/Trauma to assess pain **AND** Pulse Ox (Oximetry) **AND** Pharmacy Consult Request **AND** If patient difficult to arouse and/or has respiratory depression, stop any opiates that are currently infusing and call a Rapid Response. **AND** oxyCODONE immediate-release **AND** [DISCONTINUED] oxyCODONE immediate-release **AND** morphine injection  •  ondansetron  •  ondansetron    Labs:  Recent Labs      03/26/19   0930   WBC  8.0   RBC  4.62   HEMOGLOBIN  11.5*   HEMATOCRIT  37.4   MCV  81.0*   MCH  24.9*   RDW  51.0*   PLATELETCT  193   MPV  10.2   NEUTSPOLYS  73.60*   LYMPHOCYTES  11.50*   MONOCYTES  11.30   EOSINOPHILS  2.60   BASOPHILS  0.60     Recent Labs      03/26/19   0930   SODIUM  134*   POTASSIUM  4.2   CHLORIDE  99   CO2  27   GLUCOSE  116*   BUN  22     Recent Labs      03/26/19   0930   CREATININE  0.87       Imaging:  Ct-needle Bx-muscle Right    Result Date: 3/13/2019  3/11/2019 4:44 PM HISTORY/REASON FOR EXAM:  Right gluteal hypodense lesion. Moderate sedation was administered with continuous monitoring of the patient under the direct supervision of  Medications: 2 mg of Versed and 200 mcg of fentanyl Total sedation time 60 minutes Procedure: After the informed consent the patient was placed on the CT table on prone position. Localization CT scan was obtained. It demonstrates hypodense area in the right gluteus muscle. Subsequently a 17-gauge needle was advanced into the lesion. 20  mL of pus was aspirated. The materials were sent to the pathology. The patient tolerated the procedure without any immediate competition.     CT-guided aspiration of pus like  material in the right gluteal muscle lesion.    Ec-echocardiogram Complete W/o Cont    Result Date: 3/13/2019  Transthoracic Echo Report Echocardiography Laboratory CONCLUSIONS No prior study is available for comparison. Normal left ventricular systolic function. Left ventricular ejection fraction is visually estimated to be 65%. Normal diastolic function. Normal inferior vena cava size and inspiratory collapse. Aortic sclerosis without stenosis. Estimated right ventricular systolic pressure  is 33 mmHg. No obvious valvular vegetations are noted on a decent quality study.  If further valvular assessment is clinically indicated, consider transesophageal echocardiogram. SAM,  LV EF:  65    % FINDINGS Left Ventricle Normal left ventricular size and systolic function. Normal left ventricular wall thickness. Left ventricular ejection fraction is visually estimated to be 65%. Normal diastolic function. Right Ventricle Normal right ventricular size and systolic function. Right Atrium Normal right atrial size. Normal inferior vena cava size and inspiratory collapse. Left Atrium Normal left atrial size. Mitral Valve Structurally normal mitral valve without significant stenosis or regurgitation. Aortic Valve Aortic sclerosis without stenosis. No aortic insufficiency. Tricuspid Valve Structurally normal tricuspid valve. Mild tricuspid regurgitation. Right atrial pressure is estimated to be 3 mmHg. Estimated right ventricular systolic pressure  is 33 mmHg. Pulmonic Valve The pulmonic valve is not well visualized. No pulmonic insufficiency. Pericardium Normal pericardium without effusion. Aorta The aortic root is normal.  Ascending aorta diameter is 3.0 cm. Claire Tripathi MD (Electronically Signed) Final Date:     13 March 2019                 14:38      Micro:  Results     Procedure Component Value Units Date/Time    URINALYSIS [[id number]]  (Abnormal) Collected:  03/23/19 4021    Order Status:  Completed Specimen:  Urine  from Urine, Cath Updated:  03/23/19 1825     Color Yellow     Character Sl Cloudy (A)     Specific Gravity 1.014     Ph 6.0     Glucose Negative mg/dL      Ketones Negative mg/dL      Protein 30 (A) mg/dL      Bilirubin Negative     Urobilinogen, Urine 0.2     Nitrite Negative     Leukocyte Esterase Trace (A)     Occult Blood Trace (A)     Micro Urine Req Microscopic    Narrative:       Collected By:[id number] MARSHA DAS          Assessment:  Active Hospital Problems    Diagnosis   • *Lesion of brain [G93.9]   • Mass of iliopsoas muscle group [M62.89]   • Encephalopathy [G93.40]   • Hyponatremia [E87.1]   • History of breast cancer [Z85.3]   • Diabetes mellitus type 2 in obese (HCC) [E11.69, E66.9]       Plan:  Multiple brain lesions  Low-grade fevers resolved  No current leukocytosis. Last lab 03/23  Confusion resolving  MRI with scattered small lesions incl aaron, too small to biopsy per neurosurgery  TTE neg; CHAS neg  Bcxs neg to date  ID and neurosurgery recommends repeating imaging in 2-3 weeks  Repeat MRI prior to completion of abx    Iliopsoas mass, on treatment  CT + 2.6 cm lesion in the right iliopsoas region as well as a 2.8x2.1 cm lesion in the right gluteal musculature   S/p aspiration +WBCs-cultures negative to date  Bcxs remain neg  D/c'd Vanco secondary to elevated Vanco troughs 03/23 and patient not clearing well, restarted 03/25  D/c cefepime 03/26 - may be the cause of confusion  Tolerating Meropenem started 03/26  Anticipate 14 day course Stop date 3/28/19  Midline pending  MRI w/ contrast lumbar, pelvis - Iliopsoas and gluteal fluid collections noted.   - Per primary team - gluteal abscess was drained today    Diarrhea, persistent  DC stool softeners 3/23  If continues or worsens, check C. difficile PCR    H/o breast cancer  Markedly elevated alk phos  Recommend continued diaz for mets    Type 2 diabetes mellitus  Hemoglobin A1c 6.3  Keep BS under 150 to help control current infection    I have  performed a physical exam and reviewed and updated ROS and plan today 3/27/2019.  In review of yesterday's note 3/26/2019, there are no changes except as documented above.    Disposition: SNF in [place].  However patient will complete her course of antibiotics in the hospital prior to transfer.  Recommend she has repeat imaging done prior to transfer as well    Discussed with attending Dr. Johnson

## 2019-03-27 NOTE — PROGRESS NOTES
Assumed care of patient. Patient A/O x 3 - disoriented to time.  Pt up to chair for meals. Right midline in place - CDI -  NS running at 75 mL/hr. Discussed POC for shift. No complaints of pain or nausea at this time. Patient anxious for MRI today.  Intermittently incontinent.  Call light and personal belongings within reach.  Hourly rounding throughout shift.

## 2019-03-27 NOTE — CARE PLAN
Problem: Communication  Goal: The ability to communicate needs accurately and effectively will improve  Outcome: PROGRESSING SLOWER THAN EXPECTED  Discussed POC for night with patient at bedside. Encouraged patient to voice feelings and concerns. Oriented patient to use of call light.     Problem: Pain Management  Goal: Pain level will decrease to patient's comfort goal  Outcome: PROGRESSING AS EXPECTED  Medication given as needed for pain and discomfort.

## 2019-03-27 NOTE — THERAPY
"Attempted to see pt for PT tx. Pt appeared lethargic, attempted to sit pt up on EOB for attempted arousal, though unsuccessful. Assisted nursing staff for spit>supine and rolling for pericare. Will reattempt when appropriate.    Physical Therapy Treatment completed.   Bed Mobility:  Supine to Sit: Maximal Assist    Plan of Care: Will benefit from Physical Therapy 3 times per week    See \"Rehab Therapy-Acute\" Patient Summary Report for complete documentation.       "

## 2019-03-28 LAB
ANION GAP SERPL CALC-SCNC: 10 MMOL/L (ref 0–11.9)
BASOPHILS # BLD AUTO: 0.9 % (ref 0–1.8)
BASOPHILS # BLD: 0.04 K/UL (ref 0–0.12)
BUN SERPL-MCNC: 26 MG/DL (ref 8–22)
CALCIUM SERPL-MCNC: 10.5 MG/DL (ref 8.5–10.5)
CHLORIDE SERPL-SCNC: 100 MMOL/L (ref 96–112)
CO2 SERPL-SCNC: 29 MMOL/L (ref 20–33)
CREAT SERPL-MCNC: 0.92 MG/DL (ref 0.5–1.4)
EOSINOPHIL # BLD AUTO: 0.01 K/UL (ref 0–0.51)
EOSINOPHIL NFR BLD: 0.2 % (ref 0–6.9)
ERYTHROCYTE [DISTWIDTH] IN BLOOD BY AUTOMATED COUNT: 50.7 FL (ref 35.9–50)
GLUCOSE SERPL-MCNC: 123 MG/DL (ref 65–99)
HCT VFR BLD AUTO: 40.6 % (ref 37–47)
HGB BLD-MCNC: 12.6 G/DL (ref 12–16)
IMM GRANULOCYTES # BLD AUTO: 0.03 K/UL (ref 0–0.11)
IMM GRANULOCYTES NFR BLD AUTO: 0.7 % (ref 0–0.9)
INR PPP: 1.02 (ref 0.87–1.13)
LYMPHOCYTES # BLD AUTO: 0.4 K/UL (ref 1–4.8)
LYMPHOCYTES NFR BLD: 9 % (ref 22–41)
MCH RBC QN AUTO: 25.1 PG (ref 27–33)
MCHC RBC AUTO-ENTMCNC: 31 G/DL (ref 33.6–35)
MCV RBC AUTO: 80.9 FL (ref 81.4–97.8)
MONOCYTES # BLD AUTO: 0.41 K/UL (ref 0–0.85)
MONOCYTES NFR BLD AUTO: 9.2 % (ref 0–13.4)
NEUTROPHILS # BLD AUTO: 3.56 K/UL (ref 2–7.15)
NEUTROPHILS NFR BLD: 80 % (ref 44–72)
NRBC # BLD AUTO: 0 K/UL
NRBC BLD-RTO: 0 /100 WBC
PLATELET # BLD AUTO: 156 K/UL (ref 164–446)
PMV BLD AUTO: 9.4 FL (ref 9–12.9)
POTASSIUM SERPL-SCNC: 3.1 MMOL/L (ref 3.6–5.5)
PROTHROMBIN TIME: 13.5 SEC (ref 12–14.6)
RBC # BLD AUTO: 5.02 M/UL (ref 4.2–5.4)
SODIUM SERPL-SCNC: 139 MMOL/L (ref 135–145)
VANCOMYCIN TROUGH SERPL-MCNC: 21.3 UG/ML (ref 10–20)
WBC # BLD AUTO: 4.5 K/UL (ref 4.8–10.8)

## 2019-03-28 PROCEDURE — 700111 HCHG RX REV CODE 636 W/ 250 OVERRIDE (IP): Performed by: INTERNAL MEDICINE

## 2019-03-28 PROCEDURE — 85025 COMPLETE CBC W/AUTO DIFF WBC: CPT

## 2019-03-28 PROCEDURE — 700102 HCHG RX REV CODE 250 W/ 637 OVERRIDE(OP): Performed by: INTERNAL MEDICINE

## 2019-03-28 PROCEDURE — 97530 THERAPEUTIC ACTIVITIES: CPT

## 2019-03-28 PROCEDURE — A9270 NON-COVERED ITEM OR SERVICE: HCPCS | Performed by: INTERNAL MEDICINE

## 2019-03-28 PROCEDURE — 700102 HCHG RX REV CODE 250 W/ 637 OVERRIDE(OP): Performed by: HOSPITALIST

## 2019-03-28 PROCEDURE — A9270 NON-COVERED ITEM OR SERVICE: HCPCS | Performed by: HOSPITALIST

## 2019-03-28 PROCEDURE — 700111 HCHG RX REV CODE 636 W/ 250 OVERRIDE (IP): Performed by: HOSPITALIST

## 2019-03-28 PROCEDURE — 700105 HCHG RX REV CODE 258: Performed by: INTERNAL MEDICINE

## 2019-03-28 PROCEDURE — 97116 GAIT TRAINING THERAPY: CPT

## 2019-03-28 PROCEDURE — 770004 HCHG ROOM/CARE - ONCOLOGY PRIVATE *

## 2019-03-28 PROCEDURE — 80202 ASSAY OF VANCOMYCIN: CPT

## 2019-03-28 PROCEDURE — 99232 SBSQ HOSP IP/OBS MODERATE 35: CPT | Performed by: INTERNAL MEDICINE

## 2019-03-28 PROCEDURE — 80048 BASIC METABOLIC PNL TOTAL CA: CPT

## 2019-03-28 PROCEDURE — 99232 SBSQ HOSP IP/OBS MODERATE 35: CPT | Mod: GC | Performed by: INTERNAL MEDICINE

## 2019-03-28 PROCEDURE — 85610 PROTHROMBIN TIME: CPT

## 2019-03-28 RX ORDER — SODIUM CHLORIDE 9 MG/ML
500 INJECTION, SOLUTION INTRAVENOUS ONCE
Status: COMPLETED | OUTPATIENT
Start: 2019-03-28 | End: 2019-03-28

## 2019-03-28 RX ADMIN — MORPHINE SULFATE 2 MG: 4 INJECTION INTRAVENOUS at 19:49

## 2019-03-28 RX ADMIN — NYSTATIN: 100000 POWDER TOPICAL at 04:52

## 2019-03-28 RX ADMIN — MORPHINE SULFATE 2 MG: 4 INJECTION INTRAVENOUS at 02:26

## 2019-03-28 RX ADMIN — SODIUM CHLORIDE: 9 INJECTION, SOLUTION INTRAVENOUS at 10:20

## 2019-03-28 RX ADMIN — NYSTATIN: 100000 POWDER TOPICAL at 18:25

## 2019-03-28 RX ADMIN — VANCOMYCIN HYDROCHLORIDE 1000 MG: 100 INJECTION, POWDER, LYOPHILIZED, FOR SOLUTION INTRAVENOUS at 15:29

## 2019-03-28 RX ADMIN — SODIUM CHLORIDE: 9 INJECTION, SOLUTION INTRAVENOUS at 14:57

## 2019-03-28 RX ADMIN — MEROPENEM 2 G: 1 INJECTION, POWDER, FOR SOLUTION INTRAVENOUS at 05:04

## 2019-03-28 RX ADMIN — LOSARTAN POTASSIUM 50 MG: 50 TABLET ORAL at 04:52

## 2019-03-28 RX ADMIN — FUROSEMIDE 40 MG: 40 TABLET ORAL at 04:51

## 2019-03-28 RX ADMIN — ENOXAPARIN SODIUM 40 MG: 100 INJECTION SUBCUTANEOUS at 04:52

## 2019-03-28 RX ADMIN — GABAPENTIN 300 MG: 300 CAPSULE ORAL at 04:52

## 2019-03-28 RX ADMIN — ACETAMINOPHEN 650 MG: 325 TABLET, FILM COATED ORAL at 19:49

## 2019-03-28 RX ADMIN — SODIUM CHLORIDE: 9 INJECTION, SOLUTION INTRAVENOUS at 04:52

## 2019-03-28 RX ADMIN — TRAMADOL HYDROCHLORIDE 50 MG: 50 TABLET, COATED ORAL at 14:51

## 2019-03-28 RX ADMIN — OMEPRAZOLE 20 MG: 20 CAPSULE, DELAYED RELEASE ORAL at 04:51

## 2019-03-28 RX ADMIN — POTASSIUM CHLORIDE 40 MEQ: 1500 TABLET, EXTENDED RELEASE ORAL at 04:51

## 2019-03-28 RX ADMIN — FUROSEMIDE 40 MG: 40 TABLET ORAL at 18:30

## 2019-03-28 RX ADMIN — POTASSIUM CHLORIDE 40 MEQ: 1500 TABLET, EXTENDED RELEASE ORAL at 18:31

## 2019-03-28 RX ADMIN — ACETAMINOPHEN 650 MG: 325 TABLET, FILM COATED ORAL at 13:35

## 2019-03-28 RX ADMIN — ACETAMINOPHEN 650 MG: 325 TABLET, FILM COATED ORAL at 04:51

## 2019-03-28 RX ADMIN — GABAPENTIN 300 MG: 300 CAPSULE ORAL at 18:30

## 2019-03-28 RX ADMIN — MEROPENEM 2 G: 1 INJECTION, POWDER, FOR SOLUTION INTRAVENOUS at 18:25

## 2019-03-28 RX ADMIN — SODIUM CHLORIDE 500 ML: 9 INJECTION, SOLUTION INTRAVENOUS at 14:41

## 2019-03-28 ASSESSMENT — ENCOUNTER SYMPTOMS
WEAKNESS: 1
VOMITING: 0
WHEEZING: 0
FEVER: 0
CONSTIPATION: 0
DIARRHEA: 0
ABDOMINAL PAIN: 0
INSOMNIA: 0
DIZZINESS: 0
HEADACHES: 1
SENSORY CHANGE: 0
CLAUDICATION: 0
HEADACHES: 0
DEPRESSION: 0
SPEECH CHANGE: 0
NAUSEA: 0
PHOTOPHOBIA: 0
BLURRED VISION: 0
MYALGIAS: 1
SHORTNESS OF BREATH: 0
BACK PAIN: 1
HEARTBURN: 0
NERVOUS/ANXIOUS: 0
PALPITATIONS: 0
CHILLS: 0
WEAKNESS: 0
SORE THROAT: 0
STRIDOR: 0
COUGH: 0

## 2019-03-28 ASSESSMENT — COGNITIVE AND FUNCTIONAL STATUS - GENERAL
TURNING FROM BACK TO SIDE WHILE IN FLAT BAD: UNABLE
WALKING IN HOSPITAL ROOM: A LOT
MOBILITY SCORE: 8
MOVING TO AND FROM BED TO CHAIR: UNABLE
CLIMB 3 TO 5 STEPS WITH RAILING: TOTAL
STANDING UP FROM CHAIR USING ARMS: A LOT
MOVING FROM LYING ON BACK TO SITTING ON SIDE OF FLAT BED: UNABLE
SUGGESTED CMS G CODE MODIFIER MOBILITY: CM

## 2019-03-28 ASSESSMENT — GAIT ASSESSMENTS
DISTANCE (FEET): 5
GAIT LEVEL OF ASSIST: MODERATE ASSIST
DEVIATION: STEP TO;DECREASED BASE OF SUPPORT;BRADYKINETIC;SHUFFLED GAIT;DECREASED HEEL STRIKE;DECREASED TOE OFF
ASSISTIVE DEVICE: FRONT WHEEL WALKER

## 2019-03-28 NOTE — PROGRESS NOTES
Patient is alert with intermittent confusion. Patient esperanza appear to be slightly lethargic and drowsy. Per previous nurse, patient slept most of the day. Patient was incontinent on shift, changed and kept clean and dry thus far. IV fluids continue as ordered. Repositioned Q 2 hours and PRN on shift. Fluids and call light are within reach, will continue to monitor throughout shift.

## 2019-03-28 NOTE — PROGRESS NOTES
IR called, pt can't have procedure today as she received Lovenox this morning. They'll put her on schedule tomorrow.

## 2019-03-28 NOTE — PROGRESS NOTES
Pharmacy Kinetics 68 y.o. female on vancomycin day # 3 3/27/2019    Currently on Vancomycin 1000 mg iv q24hr    Indication for Treatment: possible CNS infection     Pertinent history per medical record: Admitted on 3/8/2019 for iliopsoas mass and multiple brain lesions.  There is concern that this is infection.  All cultures are negative to date, but were drawn will on pip/tazo.  CHAS was negative. ID is following. Patient has a history of breast cancer and DM.      Other antibiotics: meropenem 2 gm IV q12h     Allergies: Patient has no known allergies.      List concerns for renal function: elevated BUN/SCr ratio, age     Pertinent cultures to date:   3/11/19 wound - right gluteal mass: negative  3/12/19 blood - peripheral x 2: no growth    Recent Labs      19   0930   WBC  8.0   NEUTSPOLYS  73.60*     Recent Labs      19   0930   BUN  22   CREATININE  0.87     No results for input(s): VANCOTROUGH, VANCOPEAK, VANCORANDOM in the last 72 hours.No intake or output data in the 24 hours ending 19 1748   Blood pressure 150/82, pulse (!) 111, temperature 37.7 °C (99.8 °F), temperature source Temporal, resp. rate 19, height 1.524 m (5'), weight 62.9 kg (138 lb 10.7 oz), SpO2 94 %, not currently breastfeeding. Temp (24hrs), Av.3 °C (99.2 °F), Min:36.6 °C (97.8 °F), Max:37.8 °C (100 °F)      A/P   1. Vancomycin dose change: not indicated  2. Next vancomycin level: 3/28 at 1430  3. Goal trough: 12-16 mcg/mL  4. Comments: Continue current dose, ID following along. Re-imaging due before abx stop to re-evaluate per notes. Trough tomorrow prior to the 4th total dose. CTM.    Trinity Pearce, PharmD, BCOP

## 2019-03-28 NOTE — PROGRESS NOTES
Pt slept majority of the day, monitored throughout shift.  Incontinent of bladder, hourly rounding in place. Pt febrile, MD notified.  Pt on abx and with abscess, no further action required. Pt  woke up for dinner with good appetite.

## 2019-03-28 NOTE — THERAPY
"Pt demonstrated poor sequencing and weight shifting, requiring maximal verbal cuing, tactile cuing, and facilitation of movement and posture. Pt demonstrated posterior lean once standing, pulling on walker, requiring physical assist to maintain balance to to manage walker. Pt would benefit from further acute PT txs to progress towards goals and independence. Recommend post acute placement at an inpatient transitional care facility to address defiicts.     Physical Therapy Treatment completed.   Bed Mobility:  Supine to Sit: Moderate Assist  Transfers: Sit to Stand: Moderate Assist  Gait: Level Of Assist: Moderate Assist with Front-Wheel Walker       Plan of Care: Will benefit from Physical Therapy 3 times per week    See \"Rehab Therapy-Acute\" Patient Summary Report for complete documentation.       "

## 2019-03-28 NOTE — PROGRESS NOTES
"[facility] Daily Progress Note    Date of Service  3/28/2019    Chief Complaint  68 y.o. female admitted 3/8/2019 with right hip pain, fever and abnormal brain mri.    Hospital Course    Patient started on empiric antibiotics given fever.  She had abnormal findings on MRI brain and CT showed \"lesion\" on right iliopsoas.  This lesion was biopsied and turned out to be abscess.  She has history of breast cancer and there is also concern of brain lesions being metastatic though their appearance is not typical of this.      Interval Problem Update  3/12 Discussed with Dr Turner for second opinion of brain lesions and based on appearance not able to r/o or r/i any diagnosis.  Given patient's presentation of confusion, hip pain and fever  - this is more supportive of infectious etiology rather than malignant.  Fluid from right gluteal area sent to micro and as of yet - no growth.  Continue zosyn for now.  3/13 Patient with slight improvement in mentation.  Blood cultures and abscess cultures are showing no growth at this time.  TTE being done while I examined patient - no evidence of vegetations on this test.  ID consultation pending - d/w Dr Garcia.  3/14 Patient feeling well today, her pain is present but not as bad as prior to admission.  She was able to follow the conversation today and is agreeable to move forward with the CHAS tomorrow.  I spoke with her brother, René, and updated him on condition yesterday afternoon also.  Will also have PICC placed in next few days as long as blood cultures remain negative as I expect a prolonged antibiotic course of treatment.  3/15 Patient without new complaints, states she needs help to move in bed.  CHAS showed no evidence of vegetations and regular diet resumed.  Culture remains negative and biopsy of brain lesion may prove needed - will watch through the weekend and if mentation does not continue to improve, will discuss with neurosurgery on Monday.  3/16 Patient states " she is okay today, mentation has waxed and waned without significant improvement.  She was febrile earlier today.  No other acute events.  3/17 Patient with significant improvement in mentation today, she is aware of her hospitalization, not falling asleep mid sentence.  Her pain mediations were held most of yesterday and likely far too strong for patient and were affecting her awareness.  Oxycodone 5 discontinued and will use tramadol instead unless pain not well controlled.  CT brain with contrast ordered as a quick scan for comparison to MRI.    3/18 Patient feeling same today, discussed need to discuss with neurosurgery for possible biopsy.  Discussed with Dr Nguyen, he reviewed MRI and CT brain and he feels they are likely metastatic lesions but are far too small to biopsy.  His recommendation is to continue with antibiotics and re-image in 2 weeks to see if any change that would be amenable to biopsy or that would further declare infection.    3/26 Patient mental status continues to wax and wane.  ID ordered MRI lumbar, pelvis and sacrum for evaluation for source of infection, patient with continued low back pain and no real improvement since I saw her 7 days prior with continued antibiotics during this time.  Cultures have not growth pathogen, repeat MRI 3/28.   3/27 Patient somnolent most of the day today, MRI's completed and showing 2 areas of fluid collections, given her history are likely abscesses and will require drainage, CT guided drainage requested and patient NPO at MN for this.  D/w ID - cultures will be done on fluid collection.  Repeat CT brain ordered for tomorrow.  3/28 Patient up to chair, diet resumed as lovenox given this am and drainage of abscesses deferred until tomorrow.  Vanco/merrem to continue and cultures will be collected from abscesses..  Consultants/Specialty  ID - santino/Alex    Code Status  full    Disposition  SNF in [place] after completion of abx.    Review of  Systems  Review of Systems   Constitutional: Negative for chills and fever.   HENT: Negative for congestion and sore throat.    Eyes: Negative for blurred vision and photophobia.   Respiratory: Negative for cough and shortness of breath.    Cardiovascular: Negative for chest pain, claudication and leg swelling.   Gastrointestinal: Negative for abdominal pain, constipation, diarrhea, heartburn, nausea and vomiting.   Genitourinary: Negative for dysuria and hematuria.   Musculoskeletal: Positive for back pain and myalgias. Negative for joint pain.   Skin: Negative for itching and rash.   Neurological: Negative for dizziness, sensory change, speech change, weakness and headaches.   Psychiatric/Behavioral: Negative for depression. The patient is not nervous/anxious and does not have insomnia.         Physical Exam  Temp:  [36.6 °C (97.9 °F)-39.2 °C (102.5 °F)] 36.6 °C (97.9 °F)  Pulse:  [] 72  Resp:  [18-19] 18  BP: (112-150)/(63-82) 145/63  SpO2:  [92 %-98 %] 98 %    Physical Exam   Constitutional: She is oriented to person, place, and time. She appears well-developed and well-nourished. No distress.   HENT:   Head: Normocephalic and atraumatic.   Eyes: Conjunctivae are normal. No scleral icterus.   Neck: Neck supple. No JVD present.   Cardiovascular: Normal rate, regular rhythm and normal heart sounds.  Exam reveals no gallop and no friction rub.    No murmur heard.  Pulmonary/Chest: Effort normal and breath sounds normal. No respiratory distress. She exhibits no tenderness.   Abdominal: Soft. Bowel sounds are normal. She exhibits no distension. There is no guarding.   Musculoskeletal: She exhibits no edema or tenderness.   Lymphadenopathy:     She has no cervical adenopathy.   Neurological: She is alert and oriented to person, place, and time. No cranial nerve deficit.   Skin: Skin is warm and dry. She is not diaphoretic. No erythema. No pallor.   Psychiatric: She has a normal mood and affect. Her behavior is  normal.   Nursing note and vitals reviewed.      Fluids    Intake/Output Summary (Last 24 hours) at 03/28/19 1311  Last data filed at 03/28/19 0608   Gross per 24 hour   Intake             1776 ml   Output                0 ml   Net             1776 ml       Laboratory  Recent Labs      03/26/19 0930  03/28/19   0947   WBC  8.0  4.5*   RBC  4.62  5.02   HEMOGLOBIN  11.5*  12.6   HEMATOCRIT  37.4  40.6   MCV  81.0*  80.9*   MCH  24.9*  25.1*   MCHC  30.7*  31.0*   RDW  51.0*  50.7*   PLATELETCT  193  156*   MPV  10.2  9.4     Recent Labs      03/26/19   0930  03/28/19   0947   SODIUM  134*  139   POTASSIUM  4.2  3.1*   CHLORIDE  99  100   CO2  27  29   GLUCOSE  116*  123*   BUN  22  26*   CREATININE  0.87  0.92   CALCIUM  10.7*  10.5     Recent Labs      03/28/19   0947   INR  1.02               Imaging  MR-LUMBAR SPINE-WITH & W/O   Final Result      1.  There are multifocal enhancing fluid collections in the right iliopsoas muscles and gluteus muscles adjacent to the right ilium. Right iliopsoas fluid collection measures an approximately 3.9 x 2.9 cm. The right gluteal fluid collection measures an    approximately 3.5 x 2.8 cm in size. The differential diagnosis includes postsurgical seroma and abscess.   2.  Post operative and degenerative changes as described above.      MR-PELVIS-WITH & W/O AND SEQUENCES   Final Result      1.  Surgical screw traverses both sacroiliac joint across presumed pathologic fracture of the right sacrum and the hardware obscures the adjacent tissues.      2.  No other evidence for bony metastatic disease      3.  Right gluteal musculature rim-enhancing fluid collection measuring 3.4 x 2.8 cm. This could be a postoperative seroma versus abscess      4.  osteoarthritis of both hip joint      5.  Prior hysterectomy      IR-MIDLINE CATHETER INSERTION WO GUIDANCE > AGE 3   Final Result                  Ultrasound-guided midline placement performed by qualified nursing staff    as above.           CT-HEAD WITH   Final Result      Multiple subcentimeter too numerous to count enhancing masses scattered throughout the supratentorial and infratentorial brain. These demonstrate some surrounding vasogenic edema and most likely represent multifocal metastatic lesions. Other    considerations would include multifocal abscesses or septic emboli.      EC-CHAS W/O CONT   Final Result      EC-CHAS W/O CONT   Final Result      CT-NEEDLE BX-MUSCLE RIGHT   Final Result      CT-guided aspiration of pus like material in the right gluteal muscle lesion.      EC-ECHOCARDIOGRAM COMPLETE W/O CONT   Final Result      OUTSIDE IMAGES-CT CHEST/ABDOMEN/PELVIS   Final Result      OUTSIDE IMAGES-DX CHEST   Final Result      GJ-[id number]-CNCWMPO FILM X-RAY   Final Result      OUTSIDE IMAGES-MR BRAIN   Final Result      OUTSIDE IMAGES-MR BRAIN   Final Result      CT-UNLISTED CT PROCEDURE    (Results Pending)        Assessment/Plan  * Lesion of brain   Assessment & Plan    Metastatic cancer vs infectious etiology  D/w Dr Black for questionable brain mets, recommending IR Bx of psoas muscle   IR Bx of psoas muscle ordered - was abscess  Empiric treatment of infection  TTE and CHAS negative for vegetations.  D/w Dr Nguyen - lesions likely metastatic based on his interpretation of MRI/CT - too small to biopsy, recommends continuing antibiotics and re-imaging in 2 weeks to see if there has been any change.  MRI brain to be repeated once she completes abx on 3/28         Encephalopathy   Assessment & Plan    Likely toxic metabolic from infection  Waxes and wanes         Mass of iliopsoas muscle group   Assessment & Plan    Return of fluid collection, repeat drainage in IR with cultures, NPO p MN again tomorrow - delayed from today secondary to lovenox having been given.    Was on linezolid and cefepime and now on vanco/merrem -  last dose planned 3/28/19  ID consulting - d/w Dr Johnson         Normocytic anemia   Assessment & Plan    stable      RLS (restless legs syndrome)- (present on admission)   Assessment & Plan    Pramipexole       Diabetes mellitus type 2 in obese (HCC)- (present on admission)   Assessment & Plan    Well controlled   Short acting insulin as needed   Accu-Checks, hypoglycemia protocol          History of breast cancer- (present on admission)   Assessment & Plan    Mets vs infection to brain  No known active breast cancer.       COPD (chronic obstructive pulmonary disease) (HCC)- (present on admission)   Assessment & Plan    Oxygen as needed, Respiratory protocol, Bronchodilators, Incentive spirometry      Urinary tract infection   Assessment & Plan    Culture remain no growth.       Fungal infection of the groin   Assessment & Plan    Started on nystatin powder 3/24     History of deep vein thrombosis- (present on admission)   Assessment & Plan    History of deep vein thrombosis   Was on Rivaroxaban as outpatient.     Held for Bx Mar 11  Restart AC once repeat CT/MRI head is done after completing abx treatment         Essential hypertension- (present on admission)   Assessment & Plan    Restarted on losartan and furosemide with hold parameters.     GERD (gastroesophageal reflux disease)- (present on admission)   Assessment & Plan    Omeprazole     Chronic pain- (present on admission)   Assessment & Plan     Multifactorial  Likely secondary to abscess, fracture  Pain control            VTE prophylaxis: scds

## 2019-03-28 NOTE — PROGRESS NOTES
Infectious Disease Progress Note    Author: Brooek Sosa M.D. Date & Time of service: 3/28/2019  4:03 PM    Chief Complaint:  Multiple brain lesions  Iliopsoas lesion    Interval History:  68-year-old female with history of diabetes and breast cancer, admitted 3/8/2019 with abdominal pain, iliopsoas lesion, and an abnormal MRI with multiple brain lesions.  3/14 AF (99.2) WBC 5.8 more awake today as compared to yesterday but not oriented-denies pain, following commands. ECHO reviewed  3/15 AF (99.1) no WBC today Had CHAS today  3/16 temp 100.5 WBC 7.9 more confused today-naked but pushing off sheets  3/17 AF WBC not done Less obtunded today-not oriented. Denies pain, N/V, HA, visual changes etc  3/18 afebrile WBC 6 patient sitting up in bed eating breakfast, she is quite soft spoken.  She denies any headache, nausea or vomiting.  Endorses a mild cough.  3/19 AF pt denies any new symptoms, no HA. Brain lesions too small for biopsy per notes. Neurosurgery recommends repeating scan in 2 weeks.  3/20 afebrile patient is sleeping and difficult to arouse  3/21 patient is sitting up in bed and is alert and awake.  She however appears to have some waxing confusion but no she is in the hospital in [place].  Complains of lower back pain but no headache, nausea or vomiting  3/22 AF no CBC, continues to have bowel incontinence but not watery per RN, pt has no new complaints today, not engaged in conversation  3/23 afebrile WBC 6.3 patient complaining of stool incontinence and diarrhea, midline placed yesterday  3/24 afebrile patient rested comfortably without any new complaints  3/25 T- max 98.5, no new labs since 03/23. Reports mild HA and weakness. Emotional when asked about hx of hip fx.  3/26 afebrile, wbc 8.0. Reports feeling agitated. Per RN appeared confused throughout the night  3/27 no change overnight. RN reports agitation overnight. Pt sleeping, difficult to wake this am  3/28 reports slight improvement from yesterday.  Moving LE easier after abscess drained by primary team.     Labs Reviewed    Review of Systems:  Review of Systems   Constitutional: Negative for chills and fever.   HENT: Negative for sore throat.    Respiratory: Negative for cough, shortness of breath, wheezing and stridor.    Cardiovascular: Negative for chest pain and palpitations.   Gastrointestinal: Negative for abdominal pain, diarrhea, nausea and vomiting.   Genitourinary: Negative for dysuria.   Musculoskeletal: Positive for back pain.        Improving   Skin: Negative for itching and rash.   Neurological: Positive for weakness and headaches. Negative for dizziness.   Limited review of systems secondary to intermittent confusion    Hemodynamics:  Temp (24hrs), Av.9 °C (100.2 °F), Min:36.6 °C (97.9 °F), Max:39.3 °C (102.8 °F)  Temperature: 37.1 °C (98.7 °F)  Pulse  Av.1  Min: 70  Max: 153  Blood Pressure : (!) 99/55 (RN notified)       Physical Exam:  Physical Exam   Constitutional: She is oriented to person, place, and time. No distress.   HENT:   Head: Normocephalic and atraumatic.   Mouth/Throat: Oropharynx is clear and moist. No oropharyngeal exudate.   Eyes: Pupils are equal, round, and reactive to light. EOM are normal. Right eye exhibits no discharge. Left eye exhibits no discharge.   Neck: Neck supple. No JVD present.   Cardiovascular: Normal rate and intact distal pulses.    Murmur heard.  Pulmonary/Chest: Effort normal. No respiratory distress. She has no wheezes.   Abdominal: Soft. Bowel sounds are normal. She exhibits no distension. There is no tenderness.   Musculoskeletal: She exhibits no tenderness or deformity.   Lymphadenopathy:     She has no cervical adenopathy.   Neurological: She is alert and oriented to person, place, and time.   Moving all extremities   Skin: Skin is warm. No rash noted. She is not diaphoretic.   Well-healed left breast mastectomy scar   Nursing note and vitals reviewed.      Meds:    Current  Facility-Administered Medications:   •  meropenem  •  MD Alert...Vancomycin per Pharmacy  •  vancomycin  •  nystatin  •  NS  •  labetalol  •  ibuprofen  •  potassium chloride SA  •  haloperidol lactate  •  pramipexole  •  furosemide  •  tramadol  •  losartan  •  gabapentin  •  omeprazole  •  Respiratory Care per Protocol  •  Respiratory Care per Protocol  •  acetaminophen  •  Notify provider if pain remains uncontrolled **AND** Use the numeric rating scale (NRS-11) on regular floors and Critical-Care Pain Observation Tool (CPOT) on ICUs/Trauma to assess pain **AND** Pulse Ox (Oximetry) **AND** Pharmacy Consult Request **AND** If patient difficult to arouse and/or has respiratory depression, stop any opiates that are currently infusing and call a Rapid Response. **AND** oxyCODONE immediate-release **AND** [DISCONTINUED] oxyCODONE immediate-release **AND** morphine injection  •  ondansetron  •  ondansetron    Labs:  Recent Labs      03/26/19 0930 03/28/19   0947   WBC  8.0  4.5*   RBC  4.62  5.02   HEMOGLOBIN  11.5*  12.6   HEMATOCRIT  37.4  40.6   MCV  81.0*  80.9*   MCH  24.9*  25.1*   RDW  51.0*  50.7*   PLATELETCT  193  156*   MPV  10.2  9.4   NEUTSPOLYS  73.60*  80.00*   LYMPHOCYTES  11.50*  9.00*   MONOCYTES  11.30  9.20   EOSINOPHILS  2.60  0.20   BASOPHILS  0.60  0.90     Recent Labs      03/26/19 0930 03/28/19   0947   SODIUM  134*  139   POTASSIUM  4.2  3.1*   CHLORIDE  99  100   CO2  27  29   GLUCOSE  116*  123*   BUN  22  26*     Recent Labs      03/26/19 0930  03/28/19   0947   CREATININE  0.87  0.92       Imaging:  Ct-needle Bx-muscle Right    Result Date: 3/13/2019  3/11/2019 4:44 PM HISTORY/REASON FOR EXAM:  Right gluteal hypodense lesion. Moderate sedation was administered with continuous monitoring of the patient under the direct supervision of  Medications: 2 mg of Versed and 200 mcg of fentanyl Total sedation time 60 minutes Procedure: After the informed consent the patient was  placed on the CT table on prone position. Localization CT scan was obtained. It demonstrates hypodense area in the right gluteus muscle. Subsequently a 17-gauge needle was advanced into the lesion. 20  mL of pus was aspirated. The materials were sent to the pathology. The patient tolerated the procedure without any immediate competition.     CT-guided aspiration of pus like material in the right gluteal muscle lesion.    Ec-echocardiogram Complete W/o Cont    Result Date: 3/13/2019  Transthoracic Echo Report Echocardiography Laboratory CONCLUSIONS No prior study is available for comparison. Normal left ventricular systolic function. Left ventricular ejection fraction is visually estimated to be 65%. Normal diastolic function. Normal inferior vena cava size and inspiratory collapse. Aortic sclerosis without stenosis. Estimated right ventricular systolic pressure  is 33 mmHg. No obvious valvular vegetations are noted on a decent quality study.  If further valvular assessment is clinically indicated, consider transesophageal echocardiogram. SAM,  LV EF:  65    % FINDINGS Left Ventricle Normal left ventricular size and systolic function. Normal left ventricular wall thickness. Left ventricular ejection fraction is visually estimated to be 65%. Normal diastolic function. Right Ventricle Normal right ventricular size and systolic function. Right Atrium Normal right atrial size. Normal inferior vena cava size and inspiratory collapse. Left Atrium Normal left atrial size. Mitral Valve Structurally normal mitral valve without significant stenosis or regurgitation. Aortic Valve Aortic sclerosis without stenosis. No aortic insufficiency. Tricuspid Valve Structurally normal tricuspid valve. Mild tricuspid regurgitation. Right atrial pressure is estimated to be 3 mmHg. Estimated right ventricular systolic pressure  is 33 mmHg. Pulmonic Valve The pulmonic valve is not well visualized. No pulmonic insufficiency. Pericardium  Normal pericardium without effusion. Aorta The aortic root is normal.  Ascending aorta diameter is 3.0 cm. Claire Tripathi MD (Electronically Signed) Final Date:     13 March 2019                 14:38      Micro:  Results     Procedure Component Value Units Date/Time    URINALYSIS [[id number]]  (Abnormal) Collected:  03/23/19 1710    Order Status:  Completed Specimen:  Urine from Urine, Cath Updated:  03/23/19 1825     Color Yellow     Character Sl Cloudy (A)     Specific Gravity 1.014     Ph 6.0     Glucose Negative mg/dL      Ketones Negative mg/dL      Protein 30 (A) mg/dL      Bilirubin Negative     Urobilinogen, Urine 0.2     Nitrite Negative     Leukocyte Esterase Trace (A)     Occult Blood Trace (A)     Micro Urine Req Microscopic    Narrative:       Collected By:[id number] MARSHA DAS          Assessment:  Active Hospital Problems    Diagnosis   • *Lesion of brain [G93.9]   • Mass of iliopsoas muscle group [M62.89]   • Encephalopathy [G93.40]   • Hyponatremia [E87.1]   • History of breast cancer [Z85.3]   • Diabetes mellitus type 2 in obese (HCC) [E11.69, E66.9]       Plan:  Multiple brain lesions  Low-grade fevers resolved  Wbc 4.5  Confusion resolving  MRI with scattered small lesions incl aaron, too small to biopsy per neurosurgery  TTE neg; CHAS neg  Bcxs neg to date  ID and neurosurgery recommends repeating imaging in 2-3 weeks  Repeat MRI prior to completion of abx    Iliopsoas mass, on treatment  CT + 2.6 cm lesion in the right iliopsoas region  S/p aspiration +WBCs-cultures negative to date  Bcxs remain neg  D/c'd Vanco secondary to elevated Vanco troughs 03/23 and patient not clearing well, restarted 03/25  D/c cefepime 03/26 - may be contributing to confusion  Tolerating Meropenem started 03/26, anticipate 14 day course Stop date 3/28/19  Midline pending  MRI w/ contrast lumbar, pelvis 03/27 - Iliopsoas and gluteal fluid collections noted.  Right iliopsoas region (drain pending 03/28).   2.8x2.1 cm lesion  in the right gluteal musculature drained 03/27 with improved mood and ability to move LE.    Diarrhea, persistent  DC stool softeners 3/23  If continues or worsens, check C. difficile PCR  Stable    H/o breast cancer  Markedly elevated alk phos  Recommend continued diaz for mets    Type 2 diabetes mellitus  Hemoglobin A1c 6.3  Keep BS under 150 to help control current infection    I have performed a physical exam and reviewed and updated ROS and plan today 3/28/2019.  In review of yesterday's note 3/27/2019, there are no changes except as documented above.    Disposition: SNF in [place].  However patient will complete her course of antibiotics in the hospital prior to transfer.  Recommend she has repeat imaging done prior to transfer as well    Discussed with attending Dr. Johnson

## 2019-03-28 NOTE — CARE PLAN
Problem: Safety  Goal: Will remain free from injury    Intervention: Provide assistance with mobility  Patient educated on using call light for assistance. Necessary items are kept within reach as appropriate, fall precautions in place, will continue with POC.      Problem: Skin Integrity  Goal: Risk for impaired skin integrity will decrease    Intervention: Assess risk factors for impaired skin integrity and/or pressure ulcers  Patient continues to be turned Q 2 hours and PRN educated patient on repositioning and the importance of prevention of skin breakdown. Nutrition is encouraged during waking hours. Will continue with POC.

## 2019-03-29 ENCOUNTER — APPOINTMENT (OUTPATIENT)
Dept: RADIOLOGY | Facility: MEDICAL CENTER | Age: 69
DRG: 981 | End: 2019-03-29
Attending: INTERNAL MEDICINE
Payer: MEDICARE

## 2019-03-29 LAB
ANION GAP SERPL CALC-SCNC: 8 MMOL/L (ref 0–11.9)
BASOPHILS # BLD AUTO: 0.3 % (ref 0–1.8)
BASOPHILS # BLD: 0.01 K/UL (ref 0–0.12)
BUN SERPL-MCNC: 27 MG/DL (ref 8–22)
CALCIUM SERPL-MCNC: 10.6 MG/DL (ref 8.5–10.5)
CHLORIDE SERPL-SCNC: 103 MMOL/L (ref 96–112)
CO2 SERPL-SCNC: 28 MMOL/L (ref 20–33)
CREAT SERPL-MCNC: 0.77 MG/DL (ref 0.5–1.4)
EOSINOPHIL # BLD AUTO: 0 K/UL (ref 0–0.51)
EOSINOPHIL NFR BLD: 0 % (ref 0–6.9)
ERYTHROCYTE [DISTWIDTH] IN BLOOD BY AUTOMATED COUNT: 51.5 FL (ref 35.9–50)
GLUCOSE BLD-MCNC: 115 MG/DL (ref 65–99)
GLUCOSE SERPL-MCNC: 103 MG/DL (ref 65–99)
GRAM STN SPEC: NORMAL
HCT VFR BLD AUTO: 38.6 % (ref 37–47)
HGB BLD-MCNC: 12.1 G/DL (ref 12–16)
IMM GRANULOCYTES # BLD AUTO: 0.02 K/UL (ref 0–0.11)
IMM GRANULOCYTES NFR BLD AUTO: 0.6 % (ref 0–0.9)
LYMPHOCYTES # BLD AUTO: 0.45 K/UL (ref 1–4.8)
LYMPHOCYTES NFR BLD: 13.1 % (ref 22–41)
MCH RBC QN AUTO: 25.5 PG (ref 27–33)
MCHC RBC AUTO-ENTMCNC: 31.3 G/DL (ref 33.6–35)
MCV RBC AUTO: 81.3 FL (ref 81.4–97.8)
MONOCYTES # BLD AUTO: 0.3 K/UL (ref 0–0.85)
MONOCYTES NFR BLD AUTO: 8.7 % (ref 0–13.4)
NEUTROPHILS # BLD AUTO: 2.65 K/UL (ref 2–7.15)
NEUTROPHILS NFR BLD: 77.3 % (ref 44–72)
NRBC # BLD AUTO: 0 K/UL
NRBC BLD-RTO: 0 /100 WBC
PLATELET # BLD AUTO: 116 K/UL (ref 164–446)
PMV BLD AUTO: 10.7 FL (ref 9–12.9)
POTASSIUM SERPL-SCNC: 3.3 MMOL/L (ref 3.6–5.5)
RBC # BLD AUTO: 4.75 M/UL (ref 4.2–5.4)
SIGNIFICANT IND 70042: NORMAL
SITE SITE: NORMAL
SODIUM SERPL-SCNC: 139 MMOL/L (ref 135–145)
SOURCE SOURCE: NORMAL
WBC # BLD AUTO: 3.4 K/UL (ref 4.8–10.8)

## 2019-03-29 PROCEDURE — 700102 HCHG RX REV CODE 250 W/ 637 OVERRIDE(OP): Performed by: HOSPITALIST

## 2019-03-29 PROCEDURE — 770004 HCHG ROOM/CARE - ONCOLOGY PRIVATE *

## 2019-03-29 PROCEDURE — 99232 SBSQ HOSP IP/OBS MODERATE 35: CPT | Performed by: INTERNAL MEDICINE

## 2019-03-29 PROCEDURE — 0W9H30Z DRAINAGE OF RETROPERITONEUM WITH DRAINAGE DEVICE, PERCUTANEOUS APPROACH: ICD-10-PCS | Performed by: RADIOLOGY

## 2019-03-29 PROCEDURE — 99153 MOD SED SAME PHYS/QHP EA: CPT

## 2019-03-29 PROCEDURE — 700105 HCHG RX REV CODE 258: Performed by: INTERNAL MEDICINE

## 2019-03-29 PROCEDURE — 700102 HCHG RX REV CODE 250 W/ 637 OVERRIDE(OP): Performed by: INTERNAL MEDICINE

## 2019-03-29 PROCEDURE — 700111 HCHG RX REV CODE 636 W/ 250 OVERRIDE (IP): Performed by: HOSPITALIST

## 2019-03-29 PROCEDURE — A9270 NON-COVERED ITEM OR SERVICE: HCPCS | Performed by: HOSPITALIST

## 2019-03-29 PROCEDURE — 87070 CULTURE OTHR SPECIMN AEROBIC: CPT

## 2019-03-29 PROCEDURE — 80048 BASIC METABOLIC PNL TOTAL CA: CPT

## 2019-03-29 PROCEDURE — 36415 COLL VENOUS BLD VENIPUNCTURE: CPT

## 2019-03-29 PROCEDURE — 700111 HCHG RX REV CODE 636 W/ 250 OVERRIDE (IP)

## 2019-03-29 PROCEDURE — 85025 COMPLETE CBC W/AUTO DIFF WBC: CPT

## 2019-03-29 PROCEDURE — 87205 SMEAR GRAM STAIN: CPT

## 2019-03-29 PROCEDURE — 82962 GLUCOSE BLOOD TEST: CPT

## 2019-03-29 PROCEDURE — 700111 HCHG RX REV CODE 636 W/ 250 OVERRIDE (IP): Performed by: INTERNAL MEDICINE

## 2019-03-29 PROCEDURE — A9270 NON-COVERED ITEM OR SERVICE: HCPCS | Performed by: INTERNAL MEDICINE

## 2019-03-29 RX ORDER — SODIUM CHLORIDE 9 MG/ML
500 INJECTION, SOLUTION INTRAVENOUS
Status: ACTIVE | OUTPATIENT
Start: 2019-03-29 | End: 2019-03-29

## 2019-03-29 RX ORDER — ONDANSETRON 2 MG/ML
INJECTION INTRAMUSCULAR; INTRAVENOUS
Status: COMPLETED
Start: 2019-03-29 | End: 2019-03-29

## 2019-03-29 RX ORDER — NALOXONE HYDROCHLORIDE 0.4 MG/ML
INJECTION, SOLUTION INTRAMUSCULAR; INTRAVENOUS; SUBCUTANEOUS
Status: COMPLETED
Start: 2019-03-29 | End: 2019-03-29

## 2019-03-29 RX ORDER — MIDAZOLAM HYDROCHLORIDE 1 MG/ML
.5-2 INJECTION INTRAMUSCULAR; INTRAVENOUS PRN
Status: ACTIVE | OUTPATIENT
Start: 2019-03-29 | End: 2019-03-29

## 2019-03-29 RX ORDER — ONDANSETRON 2 MG/ML
4 INJECTION INTRAMUSCULAR; INTRAVENOUS PRN
Status: ACTIVE | OUTPATIENT
Start: 2019-03-29 | End: 2019-03-29

## 2019-03-29 RX ORDER — MIDAZOLAM HYDROCHLORIDE 1 MG/ML
INJECTION INTRAMUSCULAR; INTRAVENOUS
Status: COMPLETED
Start: 2019-03-29 | End: 2019-03-29

## 2019-03-29 RX ADMIN — POTASSIUM CHLORIDE 40 MEQ: 1500 TABLET, EXTENDED RELEASE ORAL at 05:10

## 2019-03-29 RX ADMIN — OXYCODONE HYDROCHLORIDE 2.5 MG: 5 TABLET ORAL at 18:31

## 2019-03-29 RX ADMIN — FUROSEMIDE 40 MG: 40 TABLET ORAL at 05:10

## 2019-03-29 RX ADMIN — NYSTATIN: 100000 POWDER TOPICAL at 18:10

## 2019-03-29 RX ADMIN — MEROPENEM 2 G: 1 INJECTION, POWDER, FOR SOLUTION INTRAVENOUS at 05:10

## 2019-03-29 RX ADMIN — SODIUM CHLORIDE: 9 INJECTION, SOLUTION INTRAVENOUS at 05:12

## 2019-03-29 RX ADMIN — TRAMADOL HYDROCHLORIDE 50 MG: 50 TABLET, COATED ORAL at 21:17

## 2019-03-29 RX ADMIN — POTASSIUM CHLORIDE 40 MEQ: 1500 TABLET, EXTENDED RELEASE ORAL at 18:10

## 2019-03-29 RX ADMIN — MIDAZOLAM HYDROCHLORIDE 1 MG: 1 INJECTION INTRAMUSCULAR; INTRAVENOUS at 10:21

## 2019-03-29 RX ADMIN — SODIUM CHLORIDE: 9 INJECTION, SOLUTION INTRAVENOUS at 23:35

## 2019-03-29 RX ADMIN — LABETALOL HCL 100 MG: 200 TABLET, FILM COATED ORAL at 21:18

## 2019-03-29 RX ADMIN — GABAPENTIN 300 MG: 300 CAPSULE ORAL at 05:10

## 2019-03-29 RX ADMIN — FENTANYL CITRATE 25 MCG: 50 INJECTION, SOLUTION INTRAMUSCULAR; INTRAVENOUS at 10:21

## 2019-03-29 RX ADMIN — MIDAZOLAM 1 MG: 1 INJECTION INTRAMUSCULAR; INTRAVENOUS at 10:21

## 2019-03-29 RX ADMIN — OXYCODONE HYDROCHLORIDE 2.5 MG: 5 TABLET ORAL at 23:39

## 2019-03-29 RX ADMIN — OMEPRAZOLE 20 MG: 20 CAPSULE, DELAYED RELEASE ORAL at 05:10

## 2019-03-29 RX ADMIN — NYSTATIN: 100000 POWDER TOPICAL at 05:10

## 2019-03-29 RX ADMIN — VANCOMYCIN HYDROCHLORIDE 700 MG: 100 INJECTION, POWDER, LYOPHILIZED, FOR SOLUTION INTRAVENOUS at 18:16

## 2019-03-29 RX ADMIN — MORPHINE SULFATE 2 MG: 4 INJECTION INTRAVENOUS at 19:35

## 2019-03-29 RX ADMIN — GABAPENTIN 300 MG: 300 CAPSULE ORAL at 18:10

## 2019-03-29 RX ADMIN — LOSARTAN POTASSIUM 50 MG: 50 TABLET ORAL at 05:10

## 2019-03-29 RX ADMIN — FUROSEMIDE 40 MG: 40 TABLET ORAL at 18:10

## 2019-03-29 ASSESSMENT — ENCOUNTER SYMPTOMS
STRIDOR: 0
WEAKNESS: 0
VOMITING: 0
WHEEZING: 0
MYALGIAS: 1
ABDOMINAL PAIN: 0
DIARRHEA: 0
NERVOUS/ANXIOUS: 0
DEPRESSION: 0
SPEECH CHANGE: 0
COUGH: 0
HEADACHES: 0
CONSTIPATION: 0
DIZZINESS: 0
HEARTBURN: 0
SORE THROAT: 0
BLURRED VISION: 0
PALPITATIONS: 0
CLAUDICATION: 0
BACK PAIN: 1
FEVER: 0
SHORTNESS OF BREATH: 0
NAUSEA: 0
INSOMNIA: 0
WEAKNESS: 1
CHILLS: 0
PHOTOPHOBIA: 0
SENSORY CHANGE: 0

## 2019-03-29 NOTE — PROGRESS NOTES
"[facility] Medicine Daily Progress Note    Date of Service  3/29/2019    Chief Complaint  68 y.o. female admitted 3/8/2019 with right hip pain, fever and abnormal brain mri.    Hospital Course    Patient started on empiric antibiotics given fever.  She had abnormal findings on MRI brain and CT showed \"lesion\" on right iliopsoas.  This lesion was biopsied and turned out to be abscess.  She has history of breast cancer and there is also concern of brain lesions being metastatic though their appearance is not typical of this.      Interval Problem Update  3/12 Discussed with Dr Turner for second opinion of brain lesions and based on appearance not able to r/o or r/i any diagnosis.  Given patient's presentation of confusion, hip pain and fever  - this is more supportive of infectious etiology rather than malignant.  Fluid from right gluteal area sent to micro and as of yet - no growth.  Continue zosyn for now.  3/13 Patient with slight improvement in mentation.  Blood cultures and abscess cultures are showing no growth at this time.  TTE being done while I examined patient - no evidence of vegetations on this test.  ID consultation pending - d/w Dr Garcia.  3/14 Patient feeling well today, her pain is present but not as bad as prior to admission.  She was able to follow the conversation today and is agreeable to move forward with the CHAS tomorrow.  I spoke with her brother, René, and updated him on condition yesterday afternoon also.  Will also have PICC placed in next few days as long as blood cultures remain negative as I expect a prolonged antibiotic course of treatment.  3/15 Patient without new complaints, states she needs help to move in bed.  CHAS showed no evidence of vegetations and regular diet resumed.  Culture remains negative and biopsy of brain lesion may prove needed - will watch through the weekend and if mentation does not continue to improve, will discuss with neurosurgery on Monday.  3/16 Patient states " she is okay today, mentation has waxed and waned without significant improvement.  She was febrile earlier today.  No other acute events.  3/17 Patient with significant improvement in mentation today, she is aware of her hospitalization, not falling asleep mid sentence.  Her pain mediations were held most of yesterday and likely far too strong for patient and were affecting her awareness.  Oxycodone 5 discontinued and will use tramadol instead unless pain not well controlled.  CT brain with contrast ordered as a quick scan for comparison to MRI.    3/18 Patient feeling same today, discussed need to discuss with neurosurgery for possible biopsy.  Discussed with Dr Nguyen, he reviewed MRI and CT brain and he feels they are likely metastatic lesions but are far too small to biopsy.  His recommendation is to continue with antibiotics and re-image in 2 weeks to see if any change that would be amenable to biopsy or that would further declare infection.    3/26 Patient mental status continues to wax and wane.  ID ordered MRI lumbar, pelvis and sacrum for evaluation for source of infection, patient with continued low back pain and no real improvement since I saw her 7 days prior with continued antibiotics during this time.  Cultures have not growth pathogen, repeat MRI 3/28.   3/27 Patient somnolent most of the day today, MRI's completed and showing 2 areas of fluid collections, given her history are likely abscesses and will require drainage, CT guided drainage requested and patient NPO at MN for this.  D/w ID - cultures will be done on fluid collection.  Repeat CT brain ordered for tomorrow.  3/28 Patient up to chair, diet resumed as lovenox given this am and drainage of abscesses deferred until tomorrow.  Vanco/merrem to continue and cultures will be collected from abscesses.  3/29 Patient went to IR today, had drain placed in the right buttock into abscess cavity and fluid sent for culture.  Potassium is slightly low,  continue with PO replacement.  HR has improved from yesterday but patient PO intake still not at normal level, increase IVF rate to 100cc/hour.    Consultants/Specialty  ID - santino/Alex    Code Status  full    Disposition  SNF in [place] after completion of abx.    Review of Systems  Review of Systems   Constitutional: Negative for chills and fever.   HENT: Negative for congestion and sore throat.    Eyes: Negative for blurred vision and photophobia.   Respiratory: Negative for cough and shortness of breath.    Cardiovascular: Negative for chest pain, claudication and leg swelling.   Gastrointestinal: Negative for abdominal pain, constipation, diarrhea, heartburn, nausea and vomiting.   Genitourinary: Negative for dysuria and hematuria.   Musculoskeletal: Positive for back pain and myalgias. Negative for joint pain.   Skin: Negative for itching and rash.   Neurological: Negative for dizziness, sensory change, speech change, weakness and headaches.   Psychiatric/Behavioral: Negative for depression. The patient is not nervous/anxious and does not have insomnia.         Physical Exam  Temp:  [36.1 °C (97 °F)-37.8 °C (100 °F)] 36.7 °C (98.1 °F)  Pulse:  [] 112  Resp:  [19-26] 20  BP: ()/(55-86) 127/71  SpO2:  [94 %-100 %] 99 %    Physical Exam   Constitutional: She is oriented to person, place, and time. She appears well-developed and well-nourished. No distress.   HENT:   Head: Normocephalic and atraumatic.   Eyes: Conjunctivae are normal. No scleral icterus.   Neck: Neck supple. No JVD present.   Cardiovascular: Normal rate, regular rhythm and normal heart sounds.  Exam reveals no gallop and no friction rub.    No murmur heard.  Pulmonary/Chest: Effort normal and breath sounds normal. No respiratory distress. She exhibits no tenderness.   Abdominal: Soft. Bowel sounds are normal. She exhibits no distension. There is no guarding.   Musculoskeletal: She exhibits no edema or tenderness.    Lymphadenopathy:     She has no cervical adenopathy.   Neurological: She is alert and oriented to person, place, and time. No cranial nerve deficit.   Skin: Skin is warm and dry. She is not diaphoretic. No erythema. No pallor.   Psychiatric: She has a normal mood and affect. Her behavior is normal.   Nursing note and vitals reviewed.      Fluids    Intake/Output Summary (Last 24 hours) at 03/29/19 1404  Last data filed at 03/29/19 0951   Gross per 24 hour   Intake             2300 ml   Output              300 ml   Net             2000 ml       Laboratory  Recent Labs      03/28/19   0947  03/29/19   0647   WBC  4.5*  3.4*   RBC  5.02  4.75   HEMOGLOBIN  12.6  12.1   HEMATOCRIT  40.6  38.6   MCV  80.9*  81.3*   MCH  25.1*  25.5*   MCHC  31.0*  31.3*   RDW  50.7*  51.5*   PLATELETCT  156*  116*   MPV  9.4  10.7     Recent Labs      03/28/19   0947  03/29/19   0647   SODIUM  139  139   POTASSIUM  3.1*  3.3*   CHLORIDE  100  103   CO2  29  28   GLUCOSE  123*  103*   BUN  26*  27*   CREATININE  0.92  0.77   CALCIUM  10.5  10.6*     Recent Labs      03/28/19   0947   INR  1.02               Imaging  CT-DRAIN-RETROPERITONEAL   Final Result      1.  CT GUIDED CATHETER DRAINAGE OF RIGHT GLUTEAL ABSCESS.   2.  THE CURRENT PLAN IS TO OBTAIN A FOLLOW-UP CT SCAN IN 5-7 DAYS IF INDICATED. ORDERS WERE WRITTEN FOR ONCE DAILY IRRIGATION OF THE CATHETER WITH 5 ML STERILE SALINE FOR 3 DAYS.   3. THE ILIOPSOAS COLLECTION WAS NOT AMENABLE TO CATHETER DRAINAGE AS INTERVENING BOWEL AND/OR BONY STRUCTURES OBSTRUCT A PATHWAY TO THIS COLLECTION AT THIS TIME.      MR-LUMBAR SPINE-WITH & W/O   Final Result      1.  There are multifocal enhancing fluid collections in the right iliopsoas muscles and gluteus muscles adjacent to the right ilium. Right iliopsoas fluid collection measures an approximately 3.9 x 2.9 cm. The right gluteal fluid collection measures an    approximately 3.5 x 2.8 cm in size. The differential diagnosis includes  postsurgical seroma and abscess.   2.  Post operative and degenerative changes as described above.      MR-PELVIS-WITH & W/O AND SEQUENCES   Final Result      1.  Surgical screw traverses both sacroiliac joint across presumed pathologic fracture of the right sacrum and the hardware obscures the adjacent tissues.      2.  No other evidence for bony metastatic disease      3.  Right gluteal musculature rim-enhancing fluid collection measuring 3.4 x 2.8 cm. This could be a postoperative seroma versus abscess      4.  osteoarthritis of both hip joint      5.  Prior hysterectomy      IR-MIDLINE CATHETER INSERTION WO GUIDANCE > AGE 3   Final Result                  Ultrasound-guided midline placement performed by qualified nursing staff    as above.          CT-HEAD WITH   Final Result      Multiple subcentimeter too numerous to count enhancing masses scattered throughout the supratentorial and infratentorial brain. These demonstrate some surrounding vasogenic edema and most likely represent multifocal metastatic lesions. Other    considerations would include multifocal abscesses or septic emboli.      EC-CHAS W/O CONT   Final Result      EC-CHAS W/O CONT   Final Result      CT-NEEDLE BX-MUSCLE RIGHT   Final Result      CT-guided aspiration of pus like material in the right gluteal muscle lesion.      EC-ECHOCARDIOGRAM COMPLETE W/O CONT   Final Result      OUTSIDE IMAGES-CT CHEST/ABDOMEN/PELVIS   Final Result      OUTSIDE IMAGES-DX CHEST   Final Result      SD-[id number]-[id number] FILM X-RAY   Final Result      OUTSIDE IMAGES-MR BRAIN   Final Result      OUTSIDE IMAGES-MR BRAIN   Final Result           Assessment/Plan  * Lesion of brain   Assessment & Plan    Metastatic cancer vs infectious etiology  D/w Dr Black for questionable brain mets, recommending IR Bx of psoas muscle   IR Bx of psoas muscle ordered - was abscess  Empiric treatment of infection  TTE and CHAS negative for vegetations.  D/w Dr Nguyen - lesions likely  metastatic based on his interpretation of MRI/CT - too small to biopsy, recommends continuing antibiotics and re-imaging in 2 weeks to see if there has been any change.  MRI brain to be repeated once she completes abx on 3/28         Encephalopathy   Assessment & Plan    Likely toxic metabolic from infection  Waxes and wanes         Mass of iliopsoas muscle group   Assessment & Plan    Return of fluid collection, repeat drainage in IR with cultures, drain placed 3/29  Was on linezolid and cefepime and now on vanco/merrem -  last dose planned 3/28/19  ID consulting - d/w Dr Johnson           Normocytic anemia   Assessment & Plan    stable     RLS (restless legs syndrome)- (present on admission)   Assessment & Plan    Pramipexole       Diabetes mellitus type 2 in obese (HCC)- (present on admission)   Assessment & Plan    Well controlled   Short acting insulin as needed   Accu-Checks, hypoglycemia protocol          History of breast cancer- (present on admission)   Assessment & Plan    Mets vs infection to brain  No known active breast cancer.       COPD (chronic obstructive pulmonary disease) (HCC)- (present on admission)   Assessment & Plan    Oxygen as needed, Respiratory protocol, Bronchodilators, Incentive spirometry      Urinary tract infection   Assessment & Plan    Culture remain no growth.       Fungal infection of the groin   Assessment & Plan    Started on nystatin powder 3/24     History of deep vein thrombosis- (present on admission)   Assessment & Plan    History of deep vein thrombosis   Was on Rivaroxaban as outpatient.     Held for Bx Mar 11  Restart AC once repeat CT/MRI head is done after completing abx treatment         Essential hypertension- (present on admission)   Assessment & Plan    Restarted on losartan and furosemide with hold parameters.     GERD (gastroesophageal reflux disease)- (present on admission)   Assessment & Plan    Omeprazole     Chronic pain- (present on admission)   Assessment  & Plan     Multifactorial  Likely secondary to abscess, fracture  Pain control            VTE prophylaxis: scds

## 2019-03-29 NOTE — PROGRESS NOTES
Patient alert and oriented. Peritoneal drain is clean dry and intact with scant drainage. Patient given a boost and juice at this time. Diet order resumed. Vital signs are stable will continue to monitor.

## 2019-03-29 NOTE — PROGRESS NOTES
Pharmacy Kinetics 68 y.o. female on vancomycin day # 5 3/29/2019    Currently on Vancomycin 700 mg iv q24hr    Indication for Treatment: possible CNS infection     Pertinent history per medical record: Admitted on 3/8/2019 for iliopsoas mass and multiple brain lesions.  There is concern that this is infection.  All cultures are negative to date, but were drawn will on pip/tazo.  CHAS was negative. ID is following. Patient has a history of breast cancer and DM.     Other antibiotics: meropenem 2 gm IV q12h     Allergies: Patient has no known allergies.      List concerns for renal function: elevated BUN/SCr ratio, age     Pertinent cultures to date:   3/11/19 wound - right gluteal mass: negative  3/12/19 blood - peripheral x 2: no growth    Recent Labs      19   0930  19   0947  19   0647   WBC  8.0  4.5*  3.4*   NEUTSPOLYS  73.60*  80.00*  77.30*     Recent Labs      19   0930  19   0947   BUN  22  26*   CREATININE  0.87  0.92     Recent Labs      19   1430   VANCOTROUGH  21.3*     Intake/Output Summary (Last 24 hours) at 19 0716  Last data filed at 19 0001   Gross per 24 hour   Intake             2620 ml   Output                0 ml   Net             2620 ml      Blood pressure 142/79, pulse (!) 110, temperature 37.4 °C (99.4 °F), temperature source Oral, resp. rate 20, height 1.524 m (5'), weight 62.9 kg (138 lb 10.7 oz), SpO2 95 %, not currently breastfeeding. Temp (24hrs), Av.3 °C (99.2 °F), Min:36.1 °C (97 °F), Max:39.3 °C (102.8 °F)      A/P   1. Vancomycin dose change: none indicated  2. Next vancomycin level:  at 1700  3. Goal trough: 16-20 mcg/mL  4. Comments: CT and drainage from abscesses planned for today. ID is following and renal is basically stable as of most recent labs yesterday. Ordered trough for prior to new steady state on .    Sumi Boles, PharmD, BCOP, BCPS

## 2019-03-29 NOTE — PROGRESS NOTES
Patient is alert with intermittent confusion. Patient does appear to be slightly lethargic and drowsy at times but other times patient is able to carry on full conversations and is alert. Patient was incontinent on shift thus far, changed and kept clean and dry thus far. IV fluids continue as ordered. Repositioned Q 2 hours and PRN on shift. Patient is scheduled for a procedure tomorrow, will be NPO at midnight. Fluids and call light are within reach, will continue to monitor throughout shift.

## 2019-03-29 NOTE — OR SURGEON
Immediate Post- Operative Note        PostOp Diagnosis: RIGHT GLUTEAL ABSCESS COLLECTION      Procedure(s): CT GUIDED CATHETER DRAINAGE WITH PLACEMENT OF 8F [place] LOCKING LOOP CATHETER RIGHT GLUTEUS MEDIUS    EVAC 10 ML BEIGE, THEN BLOOD TINGED PUS. SPECIMEN SENT FOR C+S, GRAM STAIN      Estimated Blood Loss: <1CC        Complications: NONE        3/29/2019     10:34 AM     Primo Persaud

## 2019-03-29 NOTE — PROGRESS NOTES
Patient alert and oriented up with 2 person assist. Patient did have a large BM today in the BSC. Patient sitting in the chair at this time. Alarm is on. Drain on right buttock has small drainage dressing is clean dry and intact. Call light within reach hourly rounding in practice.

## 2019-03-29 NOTE — PROGRESS NOTES
Site Marked and Confirmed with MD, patient and RN pre procedure.  Dr. Persaud consented pt at bedside, pt AAOx4 at that time.    RIGHT Peritoneal Drain Aspiration by MD Persaud assisted by RT Willie, RIGHT flank access site.    ETCO2 range 25-37 during procedure.    1ml aspirated from RIGHT gluteus; sent to lab for C&S and gram stain, specimen delivered to lab by Willie, RT.     Patient tolerated procedure, hemodynamically stable; pt awake and talking post procedure; report given to PTARICK Espinosa;   patient transported to Cancer Nursing Unit, RM R312 via IR RN monitored then transferred care to report RN       RIGHT Gluteal Drain:  [employer] Flexima APDL Locking Pigtail, 8f x 25cm,  Ref# [id number], Lot#[id number].

## 2019-03-29 NOTE — CARE PLAN
Problem: Safety  Goal: Will remain free from falls    Intervention: Assess risk factors for falls  Bed alarm on for patient safety      Problem: Respiratory:  Goal: Respiratory status will improve    Intervention: Assess and monitor pulmonary status  Patient on 2 liters of oxygen, will continue to monitor.

## 2019-03-29 NOTE — CARE PLAN
Problem: Safety  Goal: Will remain free from injury    Intervention: Provide assistance with mobility  Patient continues to have confusion present. All appropriate fall measures are in place. Staff continues to work together on shift to prevent injury. Repositioned frequently and continues to be incontinent. Due to confusion patient does require very frequent reminders and cueing with mobility, will continue with POC and will continue to educate as appropriate throughout shift.       Problem: Psychosocial Needs:  Goal: Level of anxiety will decrease    Intervention: Identify and develop with patient strategies to cope with anxiety triggers  Patient encouraged to voice concerns, due to confusion patient has a difficult time carrying on conversations. All measures in place to promote comfort for patient and prevent anxiety triggers, will continue with POC.

## 2019-03-29 NOTE — PROGRESS NOTES
Pharmacy Kinetics 68 y.o. female on vancomycin day # 4 3/28/2019    Currently on Vancomycin 1000 mg iv q24hr    Indication for Treatment: possible CNS infection    Pertinent history per medical record: Admitted on 3/8/2019 for iliopsoas mass and multiple brain lesions.  There is concern that this is infection.  All cultures are negative to date, but were drawn will on pip/tazo.  CHAS was negative. ID is following. Patient has a history of breast cancer and DM.    Other antibiotics: meropenem 2 gm IV q12h    Allergies: Patient has no known allergies.     List concerns for renal function: elevated BUN/SCr ratio, age    Pertinent cultures to date:   3/11/19 wound - right gluteal mass: negative  3/12/19 blood - peripheral x 2: no growth    Recent Labs      19   0930  19   0947   WBC  8.0  4.5*   NEUTSPOLYS  73.60*  80.00*     Recent Labs      19   0930  19   0947   BUN  22  26*   CREATININE  0.87  0.92     Recent Labs      19   1430   VANCOTROUGH  21.3*     Intake/Output Summary (Last 24 hours) at 19 1713  Last data filed at 19 0608   Gross per 24 hour   Intake             1776 ml   Output                0 ml   Net             1776 ml      Blood pressure (!) 99/55, pulse (!) 130, temperature 37.1 °C (98.7 °F), temperature source Oral, resp. rate (!) 24, height 1.524 m (5'), weight 62.9 kg (138 lb 10.7 oz), SpO2 94 %, not currently breastfeeding. Temp (24hrs), Av.9 °C (100.2 °F), Min:36.6 °C (97.9 °F), Max:39.3 °C (102.8 °F)      A/P   1. Vancomycin dose change: Reduce to 700 mg IV q 24h starting at 1800  2. Next vancomycin level: prior to new 4th total dose (not yet ordered)  3. Goal trough: 12-16 mcg/mL  4. Comments: Reduced dose due to elevated trough. Plan is to re-image. Renal is basically stable and ID is following. Order next level in next day or two.    Sumi Boles, PharmD, BCOP, BCPS

## 2019-03-29 NOTE — PROGRESS NOTES
Infectious Disease Progress Note    Author: Beatriz Johnson M.D. Date & Time of service: 3/29/2019  3:46 PM    Chief Complaint:  Multiple brain lesions  Iliopsoas lesion    Interval History:  68-year-old female with history of diabetes and breast cancer, admitted 3/8/2019 with abdominal pain, iliopsoas lesion, and an abnormal MRI with multiple brain lesions.  3/14 AF (99.2) WBC 5.8 more awake today as compared to yesterday but not oriented-denies pain, following commands. ECHO reviewed  3/15 AF (99.1) no WBC today Had CHAS today  3/16 temp 100.5 WBC 7.9 more confused today-naked but pushing off sheets  3/17 AF WBC not done Less obtunded today-not oriented. Denies pain, N/V, HA, visual changes etc  3/18 afebrile WBC 6 patient sitting up in bed eating breakfast, she is quite soft spoken.  She denies any headache, nausea or vomiting.  Endorses a mild cough.  3/19 AF pt denies any new symptoms, no HA. Brain lesions too small for biopsy per notes. Neurosurgery recommends repeating scan in 2 weeks.  3/20 afebrile patient is sleeping and difficult to arouse  3/21 patient is sitting up in bed and is alert and awake.  She however appears to have some waxing confusion but no she is in the hospital in [place].  Complains of lower back pain but no headache, nausea or vomiting  3/22 AF no CBC, continues to have bowel incontinence but not watery per RN, pt has no new complaints today, not engaged in conversation  3/23 afebrile WBC 6.3 patient complaining of stool incontinence and diarrhea, midline placed yesterday  3/24 afebrile patient rested comfortably without any new complaints  3/25 T- max 98.5, no new labs since 03/23. Reports mild HA and weakness. Emotional when asked about hx of hip fx.  3/26 afebrile, wbc 8.0. Reports feeling agitated. Per RN appeared confused throughout the night  3/27 no change overnight. RN reports agitation overnight. Pt sleeping, difficult to wake this am  3/28 reports slight improvement from  yesterday. Moving LE easier after abscess drained by primary team.   3/29/2019 no fevers.  WBC is down to 3.4.  Underwent drainage from the right gluteal area.  It was purulent.  Labs Reviewed    Review of Systems:  Review of Systems   Constitutional: Negative for chills and fever.   HENT: Negative for sore throat.    Respiratory: Negative for cough, shortness of breath, wheezing and stridor.    Cardiovascular: Negative for chest pain and palpitations.   Gastrointestinal: Negative for abdominal pain, diarrhea, nausea and vomiting.   Genitourinary: Negative for dysuria.   Musculoskeletal: Positive for back pain.        Improving   Skin: Negative for itching and rash.   Neurological: Positive for weakness. Negative for dizziness and headaches.   Limited review of systems secondary to intermittent confusion    Hemodynamics:  Temp (24hrs), Av.9 °C (98.5 °F), Min:36.1 °C (97 °F), Max:37.6 °C (99.7 °F)  Temperature: 36.7 °C (98.1 °F)  Pulse  Av.9  Min: 70  Max: 153  Blood Pressure : 127/71       Physical Exam:  Physical Exam   Constitutional: No distress.   HENT:   Head: Normocephalic and atraumatic.   Mouth/Throat: Oropharynx is clear and moist. No oropharyngeal exudate.   Eyes: Pupils are equal, round, and reactive to light. EOM are normal. Right eye exhibits no discharge. Left eye exhibits no discharge.   Neck: Neck supple. No JVD present.   Cardiovascular: Normal rate and intact distal pulses.    Murmur heard.  Pulmonary/Chest: Effort normal. No respiratory distress. She has no wheezes.   Abdominal: Soft. Bowel sounds are normal. She exhibits no distension. There is no tenderness.   Musculoskeletal: She exhibits no tenderness or deformity.   Lymphadenopathy:     She has no cervical adenopathy.   Neurological: She is alert.   Moving all extremities  Not fully oriented   Skin: Skin is warm. No rash noted. She is not diaphoretic.   Well-healed left breast mastectomy scar   Nursing note and vitals  reviewed.      Meds:    Current Facility-Administered Medications:   •  vancomycin  •  meropenem  •  MD Alert...Vancomycin per Pharmacy  •  nystatin  •  NS  •  labetalol  •  ibuprofen  •  potassium chloride SA  •  haloperidol lactate  •  pramipexole  •  furosemide  •  tramadol  •  losartan  •  gabapentin  •  omeprazole  •  Respiratory Care per Protocol  •  Respiratory Care per Protocol  •  acetaminophen  •  Notify provider if pain remains uncontrolled **AND** Use the numeric rating scale (NRS-11) on regular floors and Critical-Care Pain Observation Tool (CPOT) on ICUs/Trauma to assess pain **AND** Pulse Ox (Oximetry) **AND** Pharmacy Consult Request **AND** If patient difficult to arouse and/or has respiratory depression, stop any opiates that are currently infusing and call a Rapid Response. **AND** oxyCODONE immediate-release **AND** [DISCONTINUED] oxyCODONE immediate-release **AND** morphine injection  •  ondansetron  •  ondansetron    Labs:  Recent Labs      03/28/19   0947  03/29/19   0647   WBC  4.5*  3.4*   RBC  5.02  4.75   HEMOGLOBIN  12.6  12.1   HEMATOCRIT  40.6  38.6   MCV  80.9*  81.3*   MCH  25.1*  25.5*   RDW  50.7*  51.5*   PLATELETCT  156*  116*   MPV  9.4  10.7   NEUTSPOLYS  80.00*  77.30*   LYMPHOCYTES  9.00*  13.10*   MONOCYTES  9.20  8.70   EOSINOPHILS  0.20  0.00   BASOPHILS  0.90  0.30     Recent Labs      03/28/19   0947  03/29/19   0647   SODIUM  139  139   POTASSIUM  3.1*  3.3*   CHLORIDE  100  103   CO2  29  28   GLUCOSE  123*  103*   BUN  26*  27*     Recent Labs      03/28/19   0947  03/29/19   0647   CREATININE  0.92  0.77       Imaging:  Ct-needle Bx-muscle Right    Result Date: 3/13/2019  3/11/2019 4:44 PM HISTORY/REASON FOR EXAM:  Right gluteal hypodense lesion. Moderate sedation was administered with continuous monitoring of the patient under the direct supervision of  Medications: 2 mg of Versed and 200 mcg of fentanyl Total sedation time 60 minutes Procedure: After  the informed consent the patient was placed on the CT table on prone position. Localization CT scan was obtained. It demonstrates hypodense area in the right gluteus muscle. Subsequently a 17-gauge needle was advanced into the lesion. 20  mL of pus was aspirated. The materials were sent to the pathology. The patient tolerated the procedure without any immediate competition.     CT-guided aspiration of pus like material in the right gluteal muscle lesion.    Ec-echocardiogram Complete W/o Cont    Result Date: 3/13/2019  Transthoracic Echo Report Echocardiography Laboratory CONCLUSIONS No prior study is available for comparison. Normal left ventricular systolic function. Left ventricular ejection fraction is visually estimated to be 65%. Normal diastolic function. Normal inferior vena cava size and inspiratory collapse. Aortic sclerosis without stenosis. Estimated right ventricular systolic pressure  is 33 mmHg. No obvious valvular vegetations are noted on a decent quality study.  If further valvular assessment is clinically indicated, consider transesophageal echocardiogram. SAM,  LV EF:  65    % FINDINGS Left Ventricle Normal left ventricular size and systolic function. Normal left ventricular wall thickness. Left ventricular ejection fraction is visually estimated to be 65%. Normal diastolic function. Right Ventricle Normal right ventricular size and systolic function. Right Atrium Normal right atrial size. Normal inferior vena cava size and inspiratory collapse. Left Atrium Normal left atrial size. Mitral Valve Structurally normal mitral valve without significant stenosis or regurgitation. Aortic Valve Aortic sclerosis without stenosis. No aortic insufficiency. Tricuspid Valve Structurally normal tricuspid valve. Mild tricuspid regurgitation. Right atrial pressure is estimated to be 3 mmHg. Estimated right ventricular systolic pressure  is 33 mmHg. Pulmonic Valve The pulmonic valve is not well visualized. No  pulmonic insufficiency. Pericardium Normal pericardium without effusion. Aorta The aortic root is normal.  Ascending aorta diameter is 3.0 cm. Claire Tripathi MD (Electronically Signed) Final Date:     13 March 2019                 14:38      Micro:  Results     Procedure Component Value Units Date/Time    GRAM STAIN [[id number]] Collected:  03/29/19 1030    Order Status:  Completed Specimen:  Wound Updated:  03/29/19 1401     Significant Indicator .     Source WND     Site Right Buttock     Gram Stain Result Moderate WBCs.  No organisms seen.      Narrative:       Collected By:[id number] JUDITH PICKENS  RIGHT Gluteal fluid aspirate x1 ml, collected by Dr. Persaud,  ordered by Dr. Keller, for gram stain and C&S.    CULTURE WOUND W/ GRAM STAIN [[id number]] Collected:  03/29/19 1030    Order Status:  Completed Specimen:  Other Updated:  03/29/19 1133    Narrative:       Collected By:[id number] JUDITH PICKENS  RIGHT Gluteal fluid aspirate x1 ml, collected by Dr. Persaud,  ordered by Dr. Keller, for gram stain and C&S.    FLUID CULTURE W/GRAM STAIN [[id number]] Collected:  03/29/19 1030    Order Status:  Canceled Specimen:  Other from Other Body Fluid     URINALYSIS [[id number]]  (Abnormal) Collected:  03/23/19 1710    Order Status:  Completed Specimen:  Urine from Urine, Cath Updated:  03/23/19 1825     Color Yellow     Character Sl Cloudy (A)     Specific Gravity 1.014     Ph 6.0     Glucose Negative mg/dL      Ketones Negative mg/dL      Protein 30 (A) mg/dL      Bilirubin Negative     Urobilinogen, Urine 0.2     Nitrite Negative     Leukocyte Esterase Trace (A)     Occult Blood Trace (A)     Micro Urine Req Microscopic    Narrative:       Collected By:[id number] MARSHA DAS          Assessment:  Active Hospital Problems    Diagnosis   • *Lesion of brain [G93.9]   • Mass of iliopsoas muscle group [M62.89]   • Encephalopathy [G93.40]   • Hyponatremia [E87.1]   • History of breast cancer [Z85.3]   • Diabetes mellitus type 2 in obese (HCC)  [E11.69, E66.9]       Plan:  Multiple brain lesions  Low-grade fevers resolved  Wbc 4.5  Confusion resolving  MRI with scattered small lesions incl aaron, too small to biopsy per neurosurgery  TTE neg; CHAS neg  Bcxs neg to date  ID and neurosurgery recommends repeating imaging in 2-3 weeks  Repeat MRI prior to completion of abx    Iliopsoas mass, on treatment  CT + 2.6 cm lesion in the right iliopsoas region  S/p aspiration +WBCs-cultures negative to date  Bcxs remain neg  D/c'd Vanco secondary to elevated Vanco troughs 03/23 and patient not clearing well, restarted 03/25  D/c cefepime 03/26 - may be contributing to confusion  Tolerating Meropenem started 03/26,  Midline pending  MRI w/ contrast lumbar, pelvis 03/27 - Iliopsoas and gluteal fluid collections noted.  Right iliopsoas region (drain pending 03/28).   2.8x2.1 cm lesion in the right gluteal musculature drained 03/27 with improved mood and ability to move LE.  Underwent drainage on 3/29/2019-it had purulent drainage-cultures are negative so far    Diarrhea, persistent  DC stool softeners 3/23  If continues or worsens, check C. difficile PCR  Stable    H/o breast cancer  Markedly elevated alk phos  Recommend continued diaz for mets    Type 2 diabetes mellitus  Hemoglobin A1c 6.3  Keep BS under 150 to help control current infection    I have performed a physical exam and reviewed and updated ROS and plan today 3/29/2019.  In review of yesterday's note 3/28/2019, there are no changes except as documented above.    Disposition: SNF in [place].  However patient will complete her course of antibiotics in the hospital prior to transfer.  Recommend she has repeat imaging done prior to transfer as well    Discussed with attending Dr. Johnson

## 2019-03-30 ENCOUNTER — APPOINTMENT (OUTPATIENT)
Dept: RADIOLOGY | Facility: MEDICAL CENTER | Age: 69
DRG: 981 | End: 2019-03-30
Attending: INTERNAL MEDICINE
Payer: MEDICARE

## 2019-03-30 LAB
ANION GAP SERPL CALC-SCNC: 8 MMOL/L (ref 0–11.9)
BASOPHILS # BLD AUTO: 0.8 % (ref 0–1.8)
BASOPHILS # BLD: 0.03 K/UL (ref 0–0.12)
BUN SERPL-MCNC: 28 MG/DL (ref 8–22)
CALCIUM SERPL-MCNC: 9.8 MG/DL (ref 8.5–10.5)
CHLORIDE SERPL-SCNC: 99 MMOL/L (ref 96–112)
CO2 SERPL-SCNC: 27 MMOL/L (ref 20–33)
CREAT SERPL-MCNC: 0.73 MG/DL (ref 0.5–1.4)
EOSINOPHIL # BLD AUTO: 0.03 K/UL (ref 0–0.51)
EOSINOPHIL NFR BLD: 0.8 % (ref 0–6.9)
ERYTHROCYTE [DISTWIDTH] IN BLOOD BY AUTOMATED COUNT: 51.1 FL (ref 35.9–50)
GLUCOSE SERPL-MCNC: 111 MG/DL (ref 65–99)
HCT VFR BLD AUTO: 35.2 % (ref 37–47)
HGB BLD-MCNC: 11 G/DL (ref 12–16)
IMM GRANULOCYTES # BLD AUTO: 0.01 K/UL (ref 0–0.11)
IMM GRANULOCYTES NFR BLD AUTO: 0.3 % (ref 0–0.9)
LYMPHOCYTES # BLD AUTO: 0.98 K/UL (ref 1–4.8)
LYMPHOCYTES NFR BLD: 25 % (ref 22–41)
MAGNESIUM SERPL-MCNC: 1.3 MG/DL (ref 1.5–2.5)
MCH RBC QN AUTO: 25.5 PG (ref 27–33)
MCHC RBC AUTO-ENTMCNC: 31.3 G/DL (ref 33.6–35)
MCV RBC AUTO: 81.7 FL (ref 81.4–97.8)
MONOCYTES # BLD AUTO: 0.6 K/UL (ref 0–0.85)
MONOCYTES NFR BLD AUTO: 15.3 % (ref 0–13.4)
NEUTROPHILS # BLD AUTO: 2.27 K/UL (ref 2–7.15)
NEUTROPHILS NFR BLD: 57.8 % (ref 44–72)
NRBC # BLD AUTO: 0 K/UL
NRBC BLD-RTO: 0 /100 WBC
PLATELET # BLD AUTO: 101 K/UL (ref 164–446)
PMV BLD AUTO: 10.6 FL (ref 9–12.9)
POTASSIUM SERPL-SCNC: 3.1 MMOL/L (ref 3.6–5.5)
RBC # BLD AUTO: 4.31 M/UL (ref 4.2–5.4)
SODIUM SERPL-SCNC: 134 MMOL/L (ref 135–145)
WBC # BLD AUTO: 3.9 K/UL (ref 4.8–10.8)

## 2019-03-30 PROCEDURE — 700111 HCHG RX REV CODE 636 W/ 250 OVERRIDE (IP): Performed by: INTERNAL MEDICINE

## 2019-03-30 PROCEDURE — 83735 ASSAY OF MAGNESIUM: CPT

## 2019-03-30 PROCEDURE — 700102 HCHG RX REV CODE 250 W/ 637 OVERRIDE(OP): Performed by: INTERNAL MEDICINE

## 2019-03-30 PROCEDURE — 99232 SBSQ HOSP IP/OBS MODERATE 35: CPT | Performed by: INTERNAL MEDICINE

## 2019-03-30 PROCEDURE — 700102 HCHG RX REV CODE 250 W/ 637 OVERRIDE(OP): Performed by: HOSPITALIST

## 2019-03-30 PROCEDURE — 770004 HCHG ROOM/CARE - ONCOLOGY PRIVATE *

## 2019-03-30 PROCEDURE — A9270 NON-COVERED ITEM OR SERVICE: HCPCS | Performed by: HOSPITALIST

## 2019-03-30 PROCEDURE — 700105 HCHG RX REV CODE 258: Performed by: INTERNAL MEDICINE

## 2019-03-30 PROCEDURE — 36415 COLL VENOUS BLD VENIPUNCTURE: CPT

## 2019-03-30 PROCEDURE — 70470 CT HEAD/BRAIN W/O & W/DYE: CPT

## 2019-03-30 PROCEDURE — 80048 BASIC METABOLIC PNL TOTAL CA: CPT

## 2019-03-30 PROCEDURE — A9270 NON-COVERED ITEM OR SERVICE: HCPCS | Performed by: INTERNAL MEDICINE

## 2019-03-30 PROCEDURE — 85025 COMPLETE CBC W/AUTO DIFF WBC: CPT

## 2019-03-30 PROCEDURE — 700117 HCHG RX CONTRAST REV CODE 255: Performed by: INTERNAL MEDICINE

## 2019-03-30 RX ORDER — MAGNESIUM SULFATE 1 G/100ML
1 INJECTION INTRAVENOUS ONCE
Status: COMPLETED | OUTPATIENT
Start: 2019-03-30 | End: 2019-03-30

## 2019-03-30 RX ORDER — POTASSIUM CHLORIDE 20 MEQ/1
40 TABLET, EXTENDED RELEASE ORAL 3 TIMES DAILY
Status: DISCONTINUED | OUTPATIENT
Start: 2019-03-30 | End: 2019-04-02

## 2019-03-30 RX ADMIN — POTASSIUM CHLORIDE 40 MEQ: 1500 TABLET, EXTENDED RELEASE ORAL at 12:07

## 2019-03-30 RX ADMIN — FUROSEMIDE 40 MG: 40 TABLET ORAL at 06:24

## 2019-03-30 RX ADMIN — MEROPENEM 2 G: 1 INJECTION, POWDER, FOR SOLUTION INTRAVENOUS at 20:45

## 2019-03-30 RX ADMIN — POTASSIUM CHLORIDE 40 MEQ: 1500 TABLET, EXTENDED RELEASE ORAL at 06:10

## 2019-03-30 RX ADMIN — OXYCODONE HYDROCHLORIDE 2.5 MG: 5 TABLET ORAL at 15:30

## 2019-03-30 RX ADMIN — NYSTATIN: 100000 POWDER TOPICAL at 06:11

## 2019-03-30 RX ADMIN — MAGNESIUM SULFATE IN DEXTROSE 1 G: 10 INJECTION, SOLUTION INTRAVENOUS at 12:09

## 2019-03-30 RX ADMIN — MEROPENEM 2 G: 1 INJECTION, POWDER, FOR SOLUTION INTRAVENOUS at 06:13

## 2019-03-30 RX ADMIN — OXYCODONE HYDROCHLORIDE 2.5 MG: 5 TABLET ORAL at 06:11

## 2019-03-30 RX ADMIN — GABAPENTIN 300 MG: 300 CAPSULE ORAL at 06:10

## 2019-03-30 RX ADMIN — POTASSIUM CHLORIDE 40 MEQ: 1500 TABLET, EXTENDED RELEASE ORAL at 20:28

## 2019-03-30 RX ADMIN — LOSARTAN POTASSIUM 50 MG: 50 TABLET ORAL at 06:24

## 2019-03-30 RX ADMIN — VANCOMYCIN HYDROCHLORIDE 700 MG: 100 INJECTION, POWDER, LYOPHILIZED, FOR SOLUTION INTRAVENOUS at 16:57

## 2019-03-30 RX ADMIN — OMEPRAZOLE 20 MG: 20 CAPSULE, DELAYED RELEASE ORAL at 06:10

## 2019-03-30 RX ADMIN — IOHEXOL 50 ML: 350 INJECTION, SOLUTION INTRAVENOUS at 11:59

## 2019-03-30 ASSESSMENT — ENCOUNTER SYMPTOMS
INSOMNIA: 0
BLURRED VISION: 0
CLAUDICATION: 0
WEAKNESS: 0
SORE THROAT: 0
DIARRHEA: 0
BACK PAIN: 1
ABDOMINAL PAIN: 0
SENSORY CHANGE: 0
DEPRESSION: 0
DIZZINESS: 0
COUGH: 0
PALPITATIONS: 0
HEARTBURN: 0
FEVER: 0
PHOTOPHOBIA: 0
SHORTNESS OF BREATH: 0
STRIDOR: 0
MYALGIAS: 1
SPEECH CHANGE: 0
CONSTIPATION: 0
CHILLS: 0
NAUSEA: 0
WEAKNESS: 1
NERVOUS/ANXIOUS: 0
HEADACHES: 0
VOMITING: 0
WHEEZING: 0

## 2019-03-30 NOTE — PROGRESS NOTES
Infectious Disease Progress Note    Author: Beatriz Johnson M.D. Date & Time of service: 3/30/2019  12:02 PM    Chief Complaint:  Multiple brain lesions  Iliopsoas lesion    Interval History:  68-year-old female with history of diabetes and breast cancer, admitted 3/8/2019 with abdominal pain, iliopsoas lesion, and an abnormal MRI with multiple brain lesions.  3/14 AF (99.2) WBC 5.8 more awake today as compared to yesterday but not oriented-denies pain, following commands. ECHO reviewed  3/15 AF (99.1) no WBC today Had CHAS today  3/16 temp 100.5 WBC 7.9 more confused today-naked but pushing off sheets  3/17 AF WBC not done Less obtunded today-not oriented. Denies pain, N/V, HA, visual changes etc  3/18 afebrile WBC 6 patient sitting up in bed eating breakfast, she is quite soft spoken.  She denies any headache, nausea or vomiting.  Endorses a mild cough.  3/19 AF pt denies any new symptoms, no HA. Brain lesions too small for biopsy per notes. Neurosurgery recommends repeating scan in 2 weeks.  3/20 afebrile patient is sleeping and difficult to arouse  3/21 patient is sitting up in bed and is alert and awake.  She however appears to have some waxing confusion but no she is in the hospital in [place].  Complains of lower back pain but no headache, nausea or vomiting  3/22 AF no CBC, continues to have bowel incontinence but not watery per RN, pt has no new complaints today, not engaged in conversation  3/23 afebrile WBC 6.3 patient complaining of stool incontinence and diarrhea, midline placed yesterday  3/24 afebrile patient rested comfortably without any new complaints  3/25 T- max 98.5, no new labs since 03/23. Reports mild HA and weakness. Emotional when asked about hx of hip fx.  3/26 afebrile, wbc 8.0. Reports feeling agitated. Per RN appeared confused throughout the night  3/27 no change overnight. RN reports agitation overnight. Pt sleeping, difficult to wake this am  3/28 reports slight improvement from  yesterday. Moving LE easier after abscess drained by primary team.   3/29/2019 no fevers.  WBC is down to 3.4.  Underwent drainage from the right gluteal area.  It was purulent.  3/30/2019-no fevers.  Remains slightly confused.  WBC is 3.9  Labs Reviewed    Review of Systems:  Review of Systems   Constitutional: Negative for chills and fever.   HENT: Negative for sore throat.    Respiratory: Negative for cough, shortness of breath, wheezing and stridor.    Cardiovascular: Negative for chest pain and palpitations.   Gastrointestinal: Negative for abdominal pain, diarrhea, nausea and vomiting.   Genitourinary: Negative for dysuria.   Musculoskeletal: Positive for back pain.        Improving   Skin: Negative for itching and rash.   Neurological: Positive for weakness. Negative for dizziness and headaches.   Limited review of systems secondary to intermittent confusion    Hemodynamics:  Temp (24hrs), Av.9 °C (98.4 °F), Min:36.4 °C (97.5 °F), Max:37.2 °C (99 °F)  Temperature: 37.2 °C (99 °F)  Pulse  Av.6  Min: 70  Max: 153  Blood Pressure : 147/78       Physical Exam:  Physical Exam   Constitutional: No distress.   HENT:   Head: Normocephalic and atraumatic.   Mouth/Throat: Oropharynx is clear and moist. No oropharyngeal exudate.   Eyes: Pupils are equal, round, and reactive to light. EOM are normal. Right eye exhibits no discharge. Left eye exhibits no discharge.   Neck: Neck supple. No JVD present.   Cardiovascular: Normal rate and intact distal pulses.    Murmur heard.  Pulmonary/Chest: Effort normal. No respiratory distress. She has no wheezes.   Abdominal: Soft. Bowel sounds are normal. She exhibits no distension. There is no tenderness.   Musculoskeletal: She exhibits no tenderness or deformity.   Right-sided hip drain with purulent drainage   Lymphadenopathy:     She has no cervical adenopathy.   Neurological: She is alert.   Moving all extremities  Not fully oriented   Skin: Skin is warm. No rash noted.  She is not diaphoretic.   Well-healed left breast mastectomy scar   Nursing note and vitals reviewed.      Meds:    Current Facility-Administered Medications:   •  potassium chloride SA  •  magnesium sulfate  •  vancomycin  •  meropenem  •  MD Alert...Vancomycin per Pharmacy  •  nystatin  •  NS  •  labetalol  •  ibuprofen  •  haloperidol lactate  •  pramipexole  •  furosemide  •  tramadol  •  losartan  •  gabapentin  •  omeprazole  •  Respiratory Care per Protocol  •  Respiratory Care per Protocol  •  acetaminophen  •  Notify provider if pain remains uncontrolled **AND** Use the numeric rating scale (NRS-11) on regular floors and Critical-Care Pain Observation Tool (CPOT) on ICUs/Trauma to assess pain **AND** Pulse Ox (Oximetry) **AND** Pharmacy Consult Request **AND** If patient difficult to arouse and/or has respiratory depression, stop any opiates that are currently infusing and call a Rapid Response. **AND** oxyCODONE immediate-release **AND** [DISCONTINUED] oxyCODONE immediate-release **AND** morphine injection  •  ondansetron  •  ondansetron    Labs:  Recent Labs      03/28/19 0947 03/29/19 0647  03/30/19   0013   WBC  4.5*  3.4*  3.9*   RBC  5.02  4.75  4.31   HEMOGLOBIN  12.6  12.1  11.0*   HEMATOCRIT  40.6  38.6  35.2*   MCV  80.9*  81.3*  81.7   MCH  25.1*  25.5*  25.5*   RDW  50.7*  51.5*  51.1*   PLATELETCT  156*  116*  101*   MPV  9.4  10.7  10.6   NEUTSPOLYS  80.00*  77.30*  57.80   LYMPHOCYTES  9.00*  13.10*  25.00   MONOCYTES  9.20  8.70  15.30*   EOSINOPHILS  0.20  0.00  0.80   BASOPHILS  0.90  0.30  0.80     Recent Labs      03/28/19   0947 03/29/19   0647  03/30/19   0013   SODIUM  139  139  134*   POTASSIUM  3.1*  3.3*  3.1*   CHLORIDE  100  103  99   CO2  29  28  27   GLUCOSE  123*  103*  111*   BUN  26*  27*  28*     Recent Labs      03/28/19   0947 03/29/19   0647  03/30/19   0013   CREATININE  0.92  0.77  0.73       Imaging:  Ct-needle Bx-muscle Right    Result Date:  3/13/2019  3/11/2019 4:44 PM HISTORY/REASON FOR EXAM:  Right gluteal hypodense lesion. Moderate sedation was administered with continuous monitoring of the patient under the direct supervision of  Medications: 2 mg of Versed and 200 mcg of fentanyl Total sedation time 60 minutes Procedure: After the informed consent the patient was placed on the CT table on prone position. Localization CT scan was obtained. It demonstrates hypodense area in the right gluteus muscle. Subsequently a 17-gauge needle was advanced into the lesion. 20  mL of pus was aspirated. The materials were sent to the pathology. The patient tolerated the procedure without any immediate competition.     CT-guided aspiration of pus like material in the right gluteal muscle lesion.    Ec-echocardiogram Complete W/o Cont    Result Date: 3/13/2019  Transthoracic Echo Report Echocardiography Laboratory CONCLUSIONS No prior study is available for comparison. Normal left ventricular systolic function. Left ventricular ejection fraction is visually estimated to be 65%. Normal diastolic function. Normal inferior vena cava size and inspiratory collapse. Aortic sclerosis without stenosis. Estimated right ventricular systolic pressure  is 33 mmHg. No obvious valvular vegetations are noted on a decent quality study.  If further valvular assessment is clinically indicated, consider transesophageal echocardiogram. SAM,  LV EF:  65    % FINDINGS Left Ventricle Normal left ventricular size and systolic function. Normal left ventricular wall thickness. Left ventricular ejection fraction is visually estimated to be 65%. Normal diastolic function. Right Ventricle Normal right ventricular size and systolic function. Right Atrium Normal right atrial size. Normal inferior vena cava size and inspiratory collapse. Left Atrium Normal left atrial size. Mitral Valve Structurally normal mitral valve without significant stenosis or regurgitation. Aortic Valve  Aortic sclerosis without stenosis. No aortic insufficiency. Tricuspid Valve Structurally normal tricuspid valve. Mild tricuspid regurgitation. Right atrial pressure is estimated to be 3 mmHg. Estimated right ventricular systolic pressure  is 33 mmHg. Pulmonic Valve The pulmonic valve is not well visualized. No pulmonic insufficiency. Pericardium Normal pericardium without effusion. Aorta The aortic root is normal.  Ascending aorta diameter is 3.0 cm. Claire Tripathi MD (Electronically Signed) Final Date:     13 March 2019                 14:38      Micro:  Results     Procedure Component Value Units Date/Time    CULTURE WOUND W/ GRAM STAIN [[id number]] Collected:  03/29/19 1030    Order Status:  Completed Specimen:  Wound Updated:  03/30/19 1037     Significant Indicator NEG     Source WND     Site Right Buttock     Culture Result Wound No growth at 24 hours     Gram Stain Result Moderate WBCs.  No organisms seen.      Narrative:       Collected By:[id number] JUDITH PICKENS  RIGHT Gluteal fluid aspirate x1 ml, collected by Dr. Persaud,  ordered by Dr. Keller, for gram stain and C&S.    GRAM STAIN [[id number]] Collected:  03/29/19 1030    Order Status:  Completed Specimen:  Wound Updated:  03/29/19 1401     Significant Indicator .     Source WND     Site Right Buttock     Gram Stain Result Moderate WBCs.  No organisms seen.      Narrative:       Collected By:[id number] JUDITH PICKENS  RIGHT Gluteal fluid aspirate x1 ml, collected by Dr. Persaud,  ordered by Dr. Keller, for gram stain and C&S.    FLUID CULTURE W/GRAM STAIN [[id number]] Collected:  03/29/19 1030    Order Status:  Canceled Specimen:  Other from Other Body Fluid     URINALYSIS [[id number]]  (Abnormal) Collected:  03/23/19 1710    Order Status:  Completed Specimen:  Urine from Urine, Cath Updated:  03/23/19 1825     Color Yellow     Character Sl Cloudy (A)     Specific Gravity 1.014     Ph 6.0     Glucose Negative mg/dL      Ketones Negative mg/dL      Protein 30 (A) mg/dL       Bilirubin Negative     Urobilinogen, Urine 0.2     Nitrite Negative     Leukocyte Esterase Trace (A)     Occult Blood Trace (A)     Micro Urine Req Microscopic    Narrative:       Collected By:[id number] MARSHA GAMBOARY PIPPA          Assessment:  Active Hospital Problems    Diagnosis   • *Lesion of brain [G93.9]   • Mass of iliopsoas muscle group [M62.89]   • Encephalopathy [G93.40]   • Hyponatremia [E87.1]   • History of breast cancer [Z85.3]   • Diabetes mellitus type 2 in obese (HCC) [E11.69, E66.9]       Plan:  Multiple brain lesions  Low-grade fevers resolved  Wbc 4.5  Confusion resolving  MRI with scattered small lesions incl aaron, too small to biopsy per neurosurgery  TTE neg; CHAS neg  Bcxs neg to date  ID and neurosurgery recommends repeating imaging in 2-3 weeks  Repeat MRI prior to completion of abx    Iliopsoas mass, on treatment  CT + 2.6 cm lesion in the right iliopsoas region  S/p aspiration +WBCs-cultures negative to date  Bcxs remain neg  D/c'd Vanco secondary to elevated Vanco troughs 03/23 and patient not clearing well, restarted 03/25  D/c cefepime 03/26 - may be contributing to confusion  Tolerating Meropenem started 03/26,  Midline pending  MRI w/ contrast lumbar, pelvis 03/27 - Iliopsoas and gluteal fluid collections noted.    Drain placed 3/29/2019  Cultures are negative so far  Aim for at least 4 weeks of antibiotics    Diarrhea, persistent  DC stool softeners 3/23  If continues or worsens, check C. difficile PCR  Stable    H/o breast cancer  Markedly elevated alk phos  Recommend continued diaz for mets    Type 2 diabetes mellitus  Hemoglobin A1c 6.3  Keep BS under 150 to help control current infection    I have performed a physical exam and reviewed and updated ROS and plan today 3/30/2019.  In review of yesterday's note 3/29/2019, there are no changes except as documented above.      Discussed with Dr. Keller

## 2019-03-30 NOTE — CARE PLAN
Problem: Safety  Goal: Will remain free from falls  Bed locked in lowest position. Call light within reach. Hourly rounding in place.     Problem: Bowel/Gastric:  Goal: Normal bowel function is maintained or improved  Patient incontinent of bowel.

## 2019-03-30 NOTE — PROGRESS NOTES
Received report and assumed patient care at change of shift. Patient is resting in bed, A&O4. Patient has c/o pain, medications provided per MAR. Plan of care discussed, questions answered. Bed is in the lowest position and locked, call light within reach, non-skid socks in place, hourly rounding. Patient reports no further needs and this time.

## 2019-03-30 NOTE — PROGRESS NOTES
"[facility] Medicine Daily Progress Note    Date of Service  3/30/2019    Chief Complaint  68 y.o. female admitted 3/8/2019 with right hip pain, fever and abnormal brain mri.    Hospital Course    Patient started on empiric antibiotics given fever.  She had abnormal findings on MRI brain and CT showed \"lesion\" on right iliopsoas.  This lesion was biopsied and turned out to be abscess.  She has history of breast cancer and there is also concern of brain lesions being metastatic though their appearance is not typical of this.      Interval Problem Update  3/12 Discussed with Dr Turnre for second opinion of brain lesions and based on appearance not able to r/o or r/i any diagnosis.  Given patient's presentation of confusion, hip pain and fever  - this is more supportive of infectious etiology rather than malignant.  Fluid from right gluteal area sent to micro and as of yet - no growth.  Continue zosyn for now.  3/13 Patient with slight improvement in mentation.  Blood cultures and abscess cultures are showing no growth at this time.  TTE being done while I examined patient - no evidence of vegetations on this test.  ID consultation pending - d/w Dr Garcia.  3/14 Patient feeling well today, her pain is present but not as bad as prior to admission.  She was able to follow the conversation today and is agreeable to move forward with the CHAS tomorrow.  I spoke with her brother, René, and updated him on condition yesterday afternoon also.  Will also have PICC placed in next few days as long as blood cultures remain negative as I expect a prolonged antibiotic course of treatment.  3/15 Patient without new complaints, states she needs help to move in bed.  CHAS showed no evidence of vegetations and regular diet resumed.  Culture remains negative and biopsy of brain lesion may prove needed - will watch through the weekend and if mentation does not continue to improve, will discuss with neurosurgery on Monday.  3/16 Patient states " she is okay today, mentation has waxed and waned without significant improvement.  She was febrile earlier today.  No other acute events.  3/17 Patient with significant improvement in mentation today, she is aware of her hospitalization, not falling asleep mid sentence.  Her pain mediations were held most of yesterday and likely far too strong for patient and were affecting her awareness.  Oxycodone 5 discontinued and will use tramadol instead unless pain not well controlled.  CT brain with contrast ordered as a quick scan for comparison to MRI.    3/18 Patient feeling same today, discussed need to discuss with neurosurgery for possible biopsy.  Discussed with Dr Nguyen, he reviewed MRI and CT brain and he feels they are likely metastatic lesions but are far too small to biopsy.  His recommendation is to continue with antibiotics and re-image in 2 weeks to see if any change that would be amenable to biopsy or that would further declare infection.    3/26 Patient mental status continues to wax and wane.  ID ordered MRI lumbar, pelvis and sacrum for evaluation for source of infection, patient with continued low back pain and no real improvement since I saw her 7 days prior with continued antibiotics during this time.  Cultures have not growth pathogen, repeat MRI 3/28.   3/27 Patient somnolent most of the day today, MRI's completed and showing 2 areas of fluid collections, given her history are likely abscesses and will require drainage, CT guided drainage requested and patient NPO at MN for this.  D/w ID - cultures will be done on fluid collection.  Repeat CT brain ordered for tomorrow.  3/28 Patient up to chair, diet resumed as lovenox given this am and drainage of abscesses deferred until tomorrow.  Vanco/merrem to continue and cultures will be collected from abscesses.  3/29 Patient went to IR today, had drain placed in the right buttock into abscess cavity and fluid sent for culture.  Potassium is slightly low,  continue with PO replacement.  HR has improved from yesterday but patient PO intake still not at normal level, increase IVF rate to 100cc/hour.  3/30 Patient without fever since drain placed right buttock.  Purulent material in ÁNGEL bulb. Cultures thus far are still showing no growth.  Antibiotics to continue.  CT head ordered to evaluate change in past 2 weeks as recommended by neurosurgery.    Consultants/Specialty  ID - de/Alex    Code Status  full    Disposition  SNF in [place] after completion of abx.    Review of Systems  Review of Systems   Constitutional: Negative for chills and fever.   HENT: Negative for congestion and sore throat.    Eyes: Negative for blurred vision and photophobia.   Respiratory: Negative for cough and shortness of breath.    Cardiovascular: Negative for chest pain, claudication and leg swelling.   Gastrointestinal: Negative for abdominal pain, constipation, diarrhea, heartburn, nausea and vomiting.   Genitourinary: Negative for dysuria and hematuria.   Musculoskeletal: Positive for back pain and myalgias. Negative for joint pain.   Skin: Negative for itching and rash.   Neurological: Negative for dizziness, sensory change, speech change, weakness and headaches.   Psychiatric/Behavioral: Negative for depression. The patient is not nervous/anxious and does not have insomnia.         Physical Exam  Temp:  [36.4 °C (97.5 °F)-37.2 °C (99 °F)] 37.2 °C (99 °F)  Pulse:  [] 96  Resp:  [16-20] 19  BP: (105-169)/(72-88) 147/78  SpO2:  [91 %-98 %] 98 %    Physical Exam   Constitutional: She is oriented to person, place, and time. She appears well-developed and well-nourished. No distress.   HENT:   Head: Normocephalic and atraumatic.   Eyes: Conjunctivae are normal. No scleral icterus.   Neck: Neck supple. No JVD present.   Cardiovascular: Normal rate, regular rhythm and normal heart sounds.  Exam reveals no gallop and no friction rub.    No murmur heard.  Pulmonary/Chest: Effort  normal and breath sounds normal. No respiratory distress. She exhibits no tenderness.   Abdominal: Soft. Bowel sounds are normal. She exhibits no distension. There is no guarding.   Musculoskeletal: She exhibits no edema or tenderness.   Lymphadenopathy:     She has no cervical adenopathy.   Neurological: She is alert and oriented to person, place, and time. No cranial nerve deficit.   Skin: Skin is warm and dry. She is not diaphoretic. No erythema. No pallor.   Psychiatric: She has a normal mood and affect. Her behavior is normal.   Nursing note and vitals reviewed.      Fluids    Intake/Output Summary (Last 24 hours) at 03/30/19 1209  Last data filed at 03/30/19 0700   Gross per 24 hour   Intake                0 ml   Output              400 ml   Net             -400 ml       Laboratory  Recent Labs      03/28/19   0947  03/29/19   0647  03/30/19   0013   WBC  4.5*  3.4*  3.9*   RBC  5.02  4.75  4.31   HEMOGLOBIN  12.6  12.1  11.0*   HEMATOCRIT  40.6  38.6  35.2*   MCV  80.9*  81.3*  81.7   MCH  25.1*  25.5*  25.5*   MCHC  31.0*  31.3*  31.3*   RDW  50.7*  51.5*  51.1*   PLATELETCT  156*  116*  101*   MPV  9.4  10.7  10.6     Recent Labs      03/28/19   0947  03/29/19   0647  03/30/19   0013   SODIUM  139  139  134*   POTASSIUM  3.1*  3.3*  3.1*   CHLORIDE  100  103  99   CO2  29  28  27   GLUCOSE  123*  103*  111*   BUN  26*  27*  28*   CREATININE  0.92  0.77  0.73   CALCIUM  10.5  10.6*  9.8     Recent Labs      03/28/19   0947   INR  1.02               Imaging  CT-HEAD WITH & W/O   Final Result      1.  Interval decrease in size and level of enhancement of multiple small punctate foci in both cerebral hemispheres and in the midbrain consistent with improving septic emboli.      2.  No hemorrhage or mass effect.      CT-DRAIN-RETROPERITONEAL   Final Result      1.  CT GUIDED CATHETER DRAINAGE OF RIGHT GLUTEAL ABSCESS.   2.  THE CURRENT PLAN IS TO OBTAIN A FOLLOW-UP CT SCAN IN 5-7 DAYS IF INDICATED. ORDERS WERE  WRITTEN FOR ONCE DAILY IRRIGATION OF THE CATHETER WITH 5 ML STERILE SALINE FOR 3 DAYS.   3. THE ILIOPSOAS COLLECTION WAS NOT AMENABLE TO CATHETER DRAINAGE AS INTERVENING BOWEL AND/OR BONY STRUCTURES OBSTRUCT A PATHWAY TO THIS COLLECTION AT THIS TIME.      MR-LUMBAR SPINE-WITH & W/O   Final Result      1.  There are multifocal enhancing fluid collections in the right iliopsoas muscles and gluteus muscles adjacent to the right ilium. Right iliopsoas fluid collection measures an approximately 3.9 x 2.9 cm. The right gluteal fluid collection measures an    approximately 3.5 x 2.8 cm in size. The differential diagnosis includes postsurgical seroma and abscess.   2.  Post operative and degenerative changes as described above.      MR-PELVIS-WITH & W/O AND SEQUENCES   Final Result      1.  Surgical screw traverses both sacroiliac joint across presumed pathologic fracture of the right sacrum and the hardware obscures the adjacent tissues.      2.  No other evidence for bony metastatic disease      3.  Right gluteal musculature rim-enhancing fluid collection measuring 3.4 x 2.8 cm. This could be a postoperative seroma versus abscess      4.  osteoarthritis of both hip joint      5.  Prior hysterectomy      IR-MIDLINE CATHETER INSERTION WO GUIDANCE > AGE 3   Final Result                  Ultrasound-guided midline placement performed by qualified nursing staff    as above.          CT-HEAD WITH   Final Result      Multiple subcentimeter too numerous to count enhancing masses scattered throughout the supratentorial and infratentorial brain. These demonstrate some surrounding vasogenic edema and most likely represent multifocal metastatic lesions. Other    considerations would include multifocal abscesses or septic emboli.      EC-CHAS W/O CONT   Final Result      EC-CHAS W/O CONT   Final Result      CT-NEEDLE BX-MUSCLE RIGHT   Final Result      CT-guided aspiration of pus like material in the right gluteal muscle lesion.       EC-ECHOCARDIOGRAM COMPLETE W/O CONT   Final Result      OUTSIDE IMAGES-CT CHEST/ABDOMEN/PELVIS   Final Result      OUTSIDE IMAGES-DX CHEST   Final Result      FP-[id number] FILM X-RAY   Final Result      OUTSIDE IMAGES-MR BRAIN   Final Result      OUTSIDE IMAGES-MR BRAIN   Final Result           Assessment/Plan  * Lesion of brain   Assessment & Plan    Metastatic cancer vs infectious etiology  D/w Dr Black for questionable brain mets, recommending IR Bx of psoas muscle   IR Bx of psoas muscle ordered - was abscess  Empiric treatment of infection  TTE and CHAS negative for vegetations.  D/w Dr Nguyen - lesions likely metastatic based on his interpretation of MRI/CT - too small to biopsy, recommends continuing antibiotics and re-imaging in 2 weeks to see if there has been any change.  CT brain to be repeated today       Encephalopathy   Assessment & Plan    Likely toxic metabolic from infection  Waxes and wanes         Mass of iliopsoas muscle group   Assessment & Plan    Return of fluid collection, repeat drainage in IR with cultures, drain placed 3/29 - 10ml purulent material drained and sent for culture - no growth a/o 3/30  Was on linezolid and cefepime and now on vanco/merrem - end date to be determined  ID consulting - d/w Dr Johnson           Normocytic anemia   Assessment & Plan    stable     RLS (restless legs syndrome)- (present on admission)   Assessment & Plan    Pramipexole       Diabetes mellitus type 2 in obese (HCC)- (present on admission)   Assessment & Plan    Well controlled   Short acting insulin as needed   Accu-Checks, hypoglycemia protocol          History of breast cancer- (present on admission)   Assessment & Plan    Mets vs infection to brain  No known active breast cancer.       COPD (chronic obstructive pulmonary disease) (HCC)- (present on admission)   Assessment & Plan    Oxygen as needed, Respiratory protocol, Bronchodilators, Incentive spirometry      Urinary tract infection    Assessment & Plan    Culture remain no growth.       Fungal infection of the groin   Assessment & Plan    Started on nystatin powder 3/24     History of deep vein thrombosis- (present on admission)   Assessment & Plan    History of deep vein thrombosis   Was on Rivaroxaban as outpatient.     Held for Bx Mar 11  Restart AC after completing abx treatment         Essential hypertension- (present on admission)   Assessment & Plan    Restarted on losartan and furosemide with hold parameters.     GERD (gastroesophageal reflux disease)- (present on admission)   Assessment & Plan    Omeprazole     Chronic pain- (present on admission)   Assessment & Plan     Multifactorial  Likely secondary to abscess, fracture  Pain control            VTE prophylaxis: scds

## 2019-03-30 NOTE — PROGRESS NOTES
Pharmacy Kinetics 68 y.o. female on vancomycin day # 6 3/30/2019    Currently on Vancomycin 700 mg iv q24hr (1800)    Indication for Treatment: possible CNS infection    Pertinent history per medical record: Admitted on 3/8/2019 for iliopsoas mass and multiple brain lesions.  There is concern that this is infectious.  All cultures are negative to date, but were drawn while on pip/tazo.  CHAS was negative.  Patient has a history of breast cancer and DM.  Wound was drained on 3/29 showing purulent discharge (cultures remain negative).  ID is following.  Plan for completion of abx in house prior to transfer to [facility] in [place].    Other antibiotics: Meropenem 2 g IV q12h    Allergies: Patient has no known allergies.     List concerns for renal function (possible concerns include abnormal LFTs, BUN/SCr ratio > 20:1, malnutrition/low albumin, CHF, obesity, hypermetabolic state (SIRS), pressors/hypotension, nephrotoxic drugs, age, contrast etc.): BUN:SCr > 20, advanced age    Pertinent cultures to date:   3/29/19: wound (right buttock) - no growth to date  3/12/19: blood (peripheral) - no growth to date  3/11/19: wound (right gluteal muscle) - no growth to date      Recent Labs      19   0947  19   0647  19   0013   WBC  4.5*  3.4*  3.9*   NEUTSPOLYS  80.00*  77.30*  57.80     Recent Labs      19   0947  19   0647  19   0013   BUN  26*  27*  28*   CREATININE  0.92  0.77  0.73     Recent Labs      19   1430   VANCOTROUGH  21.3*      Blood pressure 147/78, pulse 96, temperature 37.2 °C (99 °F), temperature source Oral, resp. rate 19, height 1.524 m (5'), weight 62.9 kg (138 lb 10.7 oz), SpO2 98 %, not currently breastfeeding. Temp (24hrs), Av.9 °C (98.4 °F), Min:36.4 °C (97.5 °F), Max:37.2 °C (99 °F)      A/P   1. Vancomycin dose change: not indicated  2. Next vancomycin level: 4/1 @ 1700 (ordered)  3. Goal trough: 16-20 mcg/ml  4. Comments: renal function remains stable.  Will  continue with current plan.  Next level to be drawn on Monday.  Pharmacy will continue to monitor and adjust when needed.      Gisele Lewis, BariD

## 2019-03-30 NOTE — PROGRESS NOTES
Report received from Day RN and assumed care at 0715. Patient is A&O x3, disoriented to event. Frequent repositioning, waffle overlay in place. Incontinent.   CT of head scheduled for today.  line used. q 4 neuro checks.     Patient reports pain, declines intervention, will continue to monitor.   Patient denies nausea.    Midline to right arm, intact and flushes, NS infusing, abx.   Moses drain to right gluteal, draining serosanguineous.    POC discussed. All needs met at this time. Call light within reach. Bed locked, in lowest position. Hourly rounding in place.

## 2019-03-31 LAB
ANION GAP SERPL CALC-SCNC: 5 MMOL/L (ref 0–11.9)
BASOPHILS # BLD AUTO: 0 % (ref 0–1.8)
BASOPHILS # BLD: 0 K/UL (ref 0–0.12)
BUN SERPL-MCNC: 18 MG/DL (ref 8–22)
CALCIUM SERPL-MCNC: 10.3 MG/DL (ref 8.5–10.5)
CHLORIDE SERPL-SCNC: 100 MMOL/L (ref 96–112)
CO2 SERPL-SCNC: 28 MMOL/L (ref 20–33)
CREAT SERPL-MCNC: 0.75 MG/DL (ref 0.5–1.4)
EOSINOPHIL # BLD AUTO: 0.03 K/UL (ref 0–0.51)
EOSINOPHIL NFR BLD: 0.7 % (ref 0–6.9)
ERYTHROCYTE [DISTWIDTH] IN BLOOD BY AUTOMATED COUNT: 50.4 FL (ref 35.9–50)
GLUCOSE SERPL-MCNC: 99 MG/DL (ref 65–99)
HCT VFR BLD AUTO: 36.2 % (ref 37–47)
HGB BLD-MCNC: 11.3 G/DL (ref 12–16)
IMM GRANULOCYTES # BLD AUTO: 0.01 K/UL (ref 0–0.11)
IMM GRANULOCYTES NFR BLD AUTO: 0.2 % (ref 0–0.9)
LYMPHOCYTES # BLD AUTO: 0.61 K/UL (ref 1–4.8)
LYMPHOCYTES NFR BLD: 14.5 % (ref 22–41)
MCH RBC QN AUTO: 25.1 PG (ref 27–33)
MCHC RBC AUTO-ENTMCNC: 31.2 G/DL (ref 33.6–35)
MCV RBC AUTO: 80.4 FL (ref 81.4–97.8)
MONOCYTES # BLD AUTO: 0.57 K/UL (ref 0–0.85)
MONOCYTES NFR BLD AUTO: 13.6 % (ref 0–13.4)
NEUTROPHILS # BLD AUTO: 2.98 K/UL (ref 2–7.15)
NEUTROPHILS NFR BLD: 71 % (ref 44–72)
NRBC # BLD AUTO: 0 K/UL
NRBC BLD-RTO: 0 /100 WBC
PLATELET # BLD AUTO: 90 K/UL (ref 164–446)
PMV BLD AUTO: 11 FL (ref 9–12.9)
POTASSIUM SERPL-SCNC: 3.8 MMOL/L (ref 3.6–5.5)
RBC # BLD AUTO: 4.5 M/UL (ref 4.2–5.4)
SODIUM SERPL-SCNC: 133 MMOL/L (ref 135–145)
WBC # BLD AUTO: 4.2 K/UL (ref 4.8–10.8)

## 2019-03-31 PROCEDURE — A9270 NON-COVERED ITEM OR SERVICE: HCPCS | Performed by: HOSPITALIST

## 2019-03-31 PROCEDURE — 770004 HCHG ROOM/CARE - ONCOLOGY PRIVATE *

## 2019-03-31 PROCEDURE — 700102 HCHG RX REV CODE 250 W/ 637 OVERRIDE(OP): Performed by: HOSPITALIST

## 2019-03-31 PROCEDURE — 700111 HCHG RX REV CODE 636 W/ 250 OVERRIDE (IP): Performed by: INTERNAL MEDICINE

## 2019-03-31 PROCEDURE — 36415 COLL VENOUS BLD VENIPUNCTURE: CPT

## 2019-03-31 PROCEDURE — 85025 COMPLETE CBC W/AUTO DIFF WBC: CPT

## 2019-03-31 PROCEDURE — 700102 HCHG RX REV CODE 250 W/ 637 OVERRIDE(OP): Performed by: INTERNAL MEDICINE

## 2019-03-31 PROCEDURE — 700105 HCHG RX REV CODE 258: Performed by: INTERNAL MEDICINE

## 2019-03-31 PROCEDURE — 99232 SBSQ HOSP IP/OBS MODERATE 35: CPT | Performed by: INTERNAL MEDICINE

## 2019-03-31 PROCEDURE — 87040 BLOOD CULTURE FOR BACTERIA: CPT

## 2019-03-31 PROCEDURE — 80048 BASIC METABOLIC PNL TOTAL CA: CPT

## 2019-03-31 PROCEDURE — A9270 NON-COVERED ITEM OR SERVICE: HCPCS | Performed by: INTERNAL MEDICINE

## 2019-03-31 RX ORDER — LOPERAMIDE HYDROCHLORIDE 2 MG/1
2 CAPSULE ORAL 4 TIMES DAILY PRN
Status: DISCONTINUED | OUTPATIENT
Start: 2019-03-31 | End: 2019-04-12

## 2019-03-31 RX ADMIN — OXYCODONE HYDROCHLORIDE 2.5 MG: 5 TABLET ORAL at 14:12

## 2019-03-31 RX ADMIN — MEROPENEM 2 G: 1 INJECTION, POWDER, FOR SOLUTION INTRAVENOUS at 20:07

## 2019-03-31 RX ADMIN — ACETAMINOPHEN 650 MG: 325 TABLET, FILM COATED ORAL at 00:29

## 2019-03-31 RX ADMIN — NYSTATIN: 100000 POWDER TOPICAL at 17:35

## 2019-03-31 RX ADMIN — POTASSIUM CHLORIDE 40 MEQ: 1500 TABLET, EXTENDED RELEASE ORAL at 14:12

## 2019-03-31 RX ADMIN — SODIUM CHLORIDE: 9 INJECTION, SOLUTION INTRAVENOUS at 17:04

## 2019-03-31 RX ADMIN — POTASSIUM CHLORIDE 40 MEQ: 1500 TABLET, EXTENDED RELEASE ORAL at 17:35

## 2019-03-31 RX ADMIN — FUROSEMIDE 40 MG: 40 TABLET ORAL at 17:34

## 2019-03-31 RX ADMIN — OXYCODONE HYDROCHLORIDE 2.5 MG: 5 TABLET ORAL at 20:07

## 2019-03-31 RX ADMIN — GABAPENTIN 300 MG: 300 CAPSULE ORAL at 05:25

## 2019-03-31 RX ADMIN — NYSTATIN: 100000 POWDER TOPICAL at 05:26

## 2019-03-31 RX ADMIN — LOSARTAN POTASSIUM 50 MG: 50 TABLET ORAL at 05:26

## 2019-03-31 RX ADMIN — POTASSIUM CHLORIDE 40 MEQ: 1500 TABLET, EXTENDED RELEASE ORAL at 05:23

## 2019-03-31 RX ADMIN — GABAPENTIN 300 MG: 300 CAPSULE ORAL at 17:34

## 2019-03-31 RX ADMIN — MEROPENEM 2 G: 1 INJECTION, POWDER, FOR SOLUTION INTRAVENOUS at 08:25

## 2019-03-31 RX ADMIN — SODIUM CHLORIDE: 9 INJECTION, SOLUTION INTRAVENOUS at 05:23

## 2019-03-31 RX ADMIN — FUROSEMIDE 40 MG: 40 TABLET ORAL at 05:25

## 2019-03-31 RX ADMIN — OXYCODONE HYDROCHLORIDE 2.5 MG: 5 TABLET ORAL at 08:25

## 2019-03-31 RX ADMIN — VANCOMYCIN HYDROCHLORIDE 700 MG: 100 INJECTION, POWDER, LYOPHILIZED, FOR SOLUTION INTRAVENOUS at 17:35

## 2019-03-31 RX ADMIN — OMEPRAZOLE 20 MG: 20 CAPSULE, DELAYED RELEASE ORAL at 05:25

## 2019-03-31 ASSESSMENT — ENCOUNTER SYMPTOMS
WHEEZING: 0
CLAUDICATION: 0
DIARRHEA: 0
STRIDOR: 0
INSOMNIA: 0
HEADACHES: 0
HEARTBURN: 0
NERVOUS/ANXIOUS: 0
FEVER: 0
PHOTOPHOBIA: 0
ABDOMINAL PAIN: 0
BLURRED VISION: 0
FEVER: 1
BACK PAIN: 1
WEAKNESS: 0
SPEECH CHANGE: 0
PALPITATIONS: 0
NAUSEA: 0
SHORTNESS OF BREATH: 0
DEPRESSION: 0
DIZZINESS: 0
VOMITING: 0
SENSORY CHANGE: 0
COUGH: 0
CONSTIPATION: 0
CHILLS: 0
MYALGIAS: 1
WEAKNESS: 1
SORE THROAT: 0

## 2019-03-31 NOTE — PROGRESS NOTES
Pt was slightly confused this am but has recovered.  Medicated for pain with good result with 2.5mg po oxy.  ÁNGEL drain with purulent thick drainage noted.  Pt is very weak.  Encouraged pt to do more for herself, try harder when standing etc.  Educated family to encourage her independence too.  Incont of urine and stool today.  Afebrile presently. VSS

## 2019-03-31 NOTE — PROGRESS NOTES
Pharmacy Kinetics 68 y.o. female on vancomycin day # 7 3/31/2019    Currently on Vancomycin 700 mg iv q24hr    Indication for Treatment:  possible CNS infection     Pertinent history per medical record: Admitted on 3/8/2019 for iliopsoas mass and multiple brain lesions.  There is concern that this is infectious.  All cultures are negative to date, but were drawn while on pip/tazo. CHAS was negative.  Patient has a history of breast cancer and DM.  Wound was drained on 3/29 showing purulent discharge (cultures remain negative).  ID is following.  Plan for completion of abx in house prior to transfer to [facility] in [place].     Other antibiotics: Meropenem 2 g IV q12h     Allergies: Patient has no known allergies.      List concerns for renal function: BUN:SCr > 20, advanced age     Pertinent cultures to date:   3/11/19 - wound (R gluteal muscle) x 2: Negative  3/12/19 - blood (peripheral) x 2: Negative  3/29/19 - wound (R buttock): x 2: NGTD    MRSA nares swab if pneumonia is a concern (ordered/positive/negative/n-a): N/A    Recent Labs      19   0947  19   0647  19   0013  19   0038   WBC  4.5*  3.4*  3.9*  4.2*   NEUTSPOLYS  80.00*  77.30*  57.80  71.00     Recent Labs      19   0947  19   0647  19   0013  19   0038   BUN  26*  27*  28*  18   CREATININE  0.92  0.77  0.73  0.75     Recent Labs      19   1430   [facility]  21.3*     Intake/Output Summary (Last 24 hours) at 19 0804  Last data filed at 19 0602   Gross per 24 hour   Intake              480 ml   Output               15 ml   Net              465 ml      Blood pressure 118/60, pulse 96, temperature 37.1 °C (98.7 °F), temperature source Axillary, resp. rate (!) 24, height 1.524 m (5'), weight 62.9 kg (138 lb 10.7 oz), SpO2 97 %, not currently breastfeeding. Temp (24hrs), Av.8 °C (100.1 °F), Min:37.1 °C (98.7 °F), Max:39.2 °C (102.6 °F)    A/P   1. Vancomycin dose change: Continue vancomycin  700 mg IV Q24h (~11 mg/kg; due @ 1800)  2. Next vancomycin level: 4/1 @ 1700  3. Goal trough: 16-20 mcg/mL  4. A/P: Tmax 102.6. Episode of tachypnea overnight, vital signs stable otherwise. Renal indices stable, UOP not consistently documented. No evidence of vancomycin toxicity present. Per ID, aim for at least 4 weeks of antibiotics. Will continue with vancomycin ~11 mg/kg IV Q24h as outlined above and check a repeat trough tomorrow. Pharmacy to monitor and adjust as needed.     Esther Taylor, PharmD, BCOP

## 2019-03-31 NOTE — PROGRESS NOTES
MD ihcks updated on patient's lethargy.   Evening medication pass to be re-evaluated.   Patient opens eyed to verbal stimuli, cannot keep eyes open.   Will continue to monitor. VSS.

## 2019-03-31 NOTE — PROGRESS NOTES
Pt unable to follow instructions for oral temp. Temporal temp 101.2, axillary temp 102.6. Applied ice pack, lowered room temperature, minimal covers on pt.  Per MD orders, administered tylenol. Will continue to monitor pt temp.

## 2019-03-31 NOTE — PROGRESS NOTES
Infectious Disease Progress Note    Author: Beatriz Johnson M.D. Date & Time of service: 3/31/2019  1:14 PM    Chief Complaint:  Multiple brain lesions  Iliopsoas lesion    Interval History:  68-year-old female with history of diabetes and breast cancer, admitted 3/8/2019 with abdominal pain, iliopsoas lesion, and an abnormal MRI with multiple brain lesions.  3/14 AF (99.2) WBC 5.8 more awake today as compared to yesterday but not oriented-denies pain, following commands. ECHO reviewed  3/15 AF (99.1) no WBC today Had CHAS today  3/16 temp 100.5 WBC 7.9 more confused today-naked but pushing off sheets  3/17 AF WBC not done Less obtunded today-not oriented. Denies pain, N/V, HA, visual changes etc  3/18 afebrile WBC 6 patient sitting up in bed eating breakfast, she is quite soft spoken.  She denies any headache, nausea or vomiting.  Endorses a mild cough.  3/19 AF pt denies any new symptoms, no HA. Brain lesions too small for biopsy per notes. Neurosurgery recommends repeating scan in 2 weeks.  3/20 afebrile patient is sleeping and difficult to arouse  3/21 patient is sitting up in bed and is alert and awake.  She however appears to have some waxing confusion but no she is in the hospital in [place].  Complains of lower back pain but no headache, nausea or vomiting  3/22 AF no CBC, continues to have bowel incontinence but not watery per RN, pt has no new complaints today, not engaged in conversation  3/23 afebrile WBC 6.3 patient complaining of stool incontinence and diarrhea, midline placed yesterday  3/24 afebrile patient rested comfortably without any new complaints  3/25 T- max 98.5, no new labs since 03/23. Reports mild HA and weakness. Emotional when asked about hx of hip fx.  3/26 afebrile, wbc 8.0. Reports feeling agitated. Per RN appeared confused throughout the night  3/27 no change overnight. RN reports agitation overnight. Pt sleeping, difficult to wake this am  3/28 reports slight improvement from  yesterday. Moving LE easier after abscess drained by primary team.   3/29/2019 no fevers.  WBC is down to 3.4.  Underwent drainage from the right gluteal area.  It was purulent.  3/30/2019-no fevers.  Remains slightly confused.  WBC is 3.9  3/31/2019 had fevers up to 102.6.  Complains of some right hand weakness.  No new issues overnight.  Family at bedside.  Labs Reviewed    Review of Systems:  Review of Systems   Constitutional: Negative for chills and fever.   HENT: Negative for sore throat.    Respiratory: Negative for cough, shortness of breath, wheezing and stridor.    Cardiovascular: Negative for chest pain and palpitations.   Gastrointestinal: Negative for abdominal pain, diarrhea, nausea and vomiting.   Genitourinary: Negative for dysuria.   Musculoskeletal: Positive for back pain.        Improving   Skin: Negative for itching and rash.   Neurological: Positive for weakness. Negative for dizziness and headaches.        Says she is having difficulty finding words and complains of some right-sided hand weakness   Limited review of systems secondary to intermittent confusion    Hemodynamics:  Temp (24hrs), Av.6 °C (99.7 °F), Min:36.4 °C (97.6 °F), Max:39.2 °C (102.6 °F)  Temperature: 36.4 °C (97.6 °F)  Pulse  Av.6  Min: 70  Max: 153  Blood Pressure : 127/70       Physical Exam:  Physical Exam   Constitutional: No distress.   HENT:   Head: Normocephalic and atraumatic.   Mouth/Throat: Oropharynx is clear and moist. No oropharyngeal exudate.   Eyes: Pupils are equal, round, and reactive to light. EOM are normal. Right eye exhibits no discharge. Left eye exhibits no discharge.   Neck: Neck supple. No JVD present.   Cardiovascular: Normal rate and intact distal pulses.    Murmur heard.  Pulmonary/Chest: Effort normal. No respiratory distress. She has no wheezes.   Abdominal: Soft. Bowel sounds are normal. She exhibits no distension. There is no tenderness.   Musculoskeletal: She exhibits no tenderness or  deformity.   Right-sided hip drain with purulent drainage   Lymphadenopathy:     She has no cervical adenopathy.   Neurological: She is alert.   Moving all extremities  Answering appropriately   Skin: Skin is warm. No rash noted. She is not diaphoretic.   Well-healed left breast mastectomy scar   Nursing note and vitals reviewed.      Meds:    Current Facility-Administered Medications:   •  loperamide  •  potassium chloride SA  •  vancomycin  •  meropenem  •  MD Alert...Vancomycin per Pharmacy  •  nystatin  •  NS  •  labetalol  •  ibuprofen  •  haloperidol lactate  •  pramipexole  •  furosemide  •  tramadol  •  losartan  •  gabapentin  •  omeprazole  •  Respiratory Care per Protocol  •  acetaminophen  •  Notify provider if pain remains uncontrolled **AND** Use the numeric rating scale (NRS-11) on regular floors and Critical-Care Pain Observation Tool (CPOT) on ICUs/Trauma to assess pain **AND** Pulse Ox (Oximetry) **AND** Pharmacy Consult Request **AND** If patient difficult to arouse and/or has respiratory depression, stop any opiates that are currently infusing and call a Rapid Response. **AND** oxyCODONE immediate-release **AND** [DISCONTINUED] oxyCODONE immediate-release **AND** morphine injection  •  ondansetron  •  ondansetron    Labs:  Recent Labs      03/29/19   0647  03/30/19 0013  03/31/19   0038   WBC  3.4*  3.9*  4.2*   RBC  4.75  4.31  4.50   HEMOGLOBIN  12.1  11.0*  11.3*   HEMATOCRIT  38.6  35.2*  36.2*   MCV  81.3*  81.7  80.4*   MCH  25.5*  25.5*  25.1*   RDW  51.5*  51.1*  50.4*   PLATELETCT  116*  101*  90*   MPV  10.7  10.6  11.0   NEUTSPOLYS  77.30*  57.80  71.00   LYMPHOCYTES  13.10*  25.00  14.50*   MONOCYTES  8.70  15.30*  13.60*   EOSINOPHILS  0.00  0.80  0.70   BASOPHILS  0.30  0.80  0.00     Recent Labs      03/29/19   0647  03/30/19   0013  03/31/19   0038   SODIUM  139  134*  133*   POTASSIUM  3.3*  3.1*  3.8   CHLORIDE  103  99  100   CO2  28  27  28   GLUCOSE  103*  111*  99   BUN   27*  28*  18     Recent Labs      03/29/19   0647  03/30/19   0013  03/31/19   0038   CREATININE  0.77  0.73  0.75       Imaging:  Ct-needle Bx-muscle Right    Result Date: 3/13/2019  3/11/2019 4:44 PM HISTORY/REASON FOR EXAM:  Right gluteal hypodense lesion. Moderate sedation was administered with continuous monitoring of the patient under the direct supervision of  Medications: 2 mg of Versed and 200 mcg of fentanyl Total sedation time 60 minutes Procedure: After the informed consent the patient was placed on the CT table on prone position. Localization CT scan was obtained. It demonstrates hypodense area in the right gluteus muscle. Subsequently a 17-gauge needle was advanced into the lesion. 20  mL of pus was aspirated. The materials were sent to the pathology. The patient tolerated the procedure without any immediate competition.     CT-guided aspiration of pus like material in the right gluteal muscle lesion.    Ec-echocardiogram Complete W/o Cont    Result Date: 3/13/2019  Transthoracic Echo Report Echocardiography Laboratory CONCLUSIONS No prior study is available for comparison. Normal left ventricular systolic function. Left ventricular ejection fraction is visually estimated to be 65%. Normal diastolic function. Normal inferior vena cava size and inspiratory collapse. Aortic sclerosis without stenosis. Estimated right ventricular systolic pressure  is 33 mmHg. No obvious valvular vegetations are noted on a decent quality study.  If further valvular assessment is clinically indicated, consider transesophageal echocardiogram. SAM,  LV EF:  65    % FINDINGS Left Ventricle Normal left ventricular size and systolic function. Normal left ventricular wall thickness. Left ventricular ejection fraction is visually estimated to be 65%. Normal diastolic function. Right Ventricle Normal right ventricular size and systolic function. Right Atrium Normal right atrial size. Normal inferior vena cava  size and inspiratory collapse. Left Atrium Normal left atrial size. Mitral Valve Structurally normal mitral valve without significant stenosis or regurgitation. Aortic Valve Aortic sclerosis without stenosis. No aortic insufficiency. Tricuspid Valve Structurally normal tricuspid valve. Mild tricuspid regurgitation. Right atrial pressure is estimated to be 3 mmHg. Estimated right ventricular systolic pressure  is 33 mmHg. Pulmonic Valve The pulmonic valve is not well visualized. No pulmonic insufficiency. Pericardium Normal pericardium without effusion. Aorta The aortic root is normal.  Ascending aorta diameter is 3.0 cm. Claire Tripathi MD (Electronically Signed) Final Date:     13 March 2019                 14:38      Micro:  Results     Procedure Component Value Units Date/Time    CULTURE WOUND W/ GRAM STAIN [[id number]] Collected:  03/29/19 1030    Order Status:  Completed Specimen:  Wound Updated:  03/31/19 0827     Significant Indicator NEG     Source WND     Site Right Buttock     Culture Result Wound No growth at 48 hours     Gram Stain Result Moderate WBCs.  No organisms seen.      Narrative:       Collected By:[id number] JUDITH PICKENS  RIGHT Gluteal fluid aspirate x1 ml, collected by Dr. Persaud,  ordered by Dr. Keller, for gram stain and C&S.    GRAM STAIN [[id number]] Collected:  03/29/19 1030    Order Status:  Completed Specimen:  Wound Updated:  03/29/19 1401     Significant Indicator .     Source WND     Site Right Buttock     Gram Stain Result Moderate WBCs.  No organisms seen.      Narrative:       Collected By:[id number] JUDITH PICKENS  RIGHT Gluteal fluid aspirate x1 ml, collected by Dr. Persaud,  ordered by Dr. Keller, for gram stain and C&S.    FLUID CULTURE W/GRAM STAIN [[id number]] Collected:  03/29/19 1030    Order Status:  Canceled Specimen:  Other from Other Body Fluid           Assessment:  Active Hospital Problems    Diagnosis   • *Lesion of brain [G93.9]   • Mass of iliopsoas muscle group [M62.89]   •  Encephalopathy [G93.40]   • Hyponatremia [E87.1]   • History of breast cancer [Z85.3]   • Diabetes mellitus type 2 in obese (HCC) [E11.69, E66.9]       Plan:  Multiple brain lesions  Low-grade fevers resolved  Wbc 4.5  Confusion resolving  MRI with scattered small lesions incl aaron, too small to biopsy per neurosurgery  TTE neg; CHAS neg  Bcxs neg to date  Repeat CT scan on 3/30/2019 shows decrease in the size of the abscesses  Repeat MRI prior to completion of abx    Iliopsoas mass, on treatment  CT + 2.6 cm lesion in the right iliopsoas region  S/p aspiration +WBCs-cultures negative to date  Bcxs remain neg  D/c'd Vanco secondary to elevated Vanco troughs 03/23 and patient not clearing well, restarted 03/25  D/c cefepime 03/26 - may be contributing to confusion  Tolerating Meropenem started 03/26,  Midline pending  MRI w/ contrast lumbar, pelvis 03/27 - Iliopsoas and gluteal fluid collections noted.    Drain placed 3/29/2019  Cultures are negative so far  Aim for at least 4-6 weeks of antibiotics    New fevers  Check blood cultures x2    H/o breast cancer  Markedly elevated alk phos  Recommend continued diaz for mets    Type 2 diabetes mellitus  Hemoglobin A1c 6.3  Keep BS under 150 to help control current infection    I have performed a physical exam and reviewed and updated ROS and plan today 3/31/2019.  In review of yesterday's note 3/30/2019, there are no changes except as documented above.      Discussed with Dr. Keller

## 2019-03-31 NOTE — PROGRESS NOTES
Pt asking for food that her brother brought in for her, pt sitting at edge of bed eating. Pt tolerated well, no issues with swallowing.  Pt temp now 98.7.

## 2019-03-31 NOTE — PROGRESS NOTES
Pt T 100.0, RR 28, MEWS 5. Notified MD. Per MD continue to monitor pt. Administer tylenol for Temp >100.5

## 2019-03-31 NOTE — PROGRESS NOTES
"[facility] Medicine Daily Progress Note    Date of Service  3/31/2019    Chief Complaint  68 y.o. female admitted 3/8/2019 with right hip pain, fever and abnormal brain mri.    Hospital Course    Patient started on empiric antibiotics given fever.  She had abnormal findings on MRI brain and CT showed \"lesion\" on right iliopsoas.  This lesion was biopsied and turned out to be abscess.  She has history of breast cancer and there is also concern of brain lesions being metastatic though their appearance is not typical of this.      Interval Problem Update  3/12 Discussed with Dr Turner for second opinion of brain lesions and based on appearance not able to r/o or r/i any diagnosis.  Given patient's presentation of confusion, hip pain and fever  - this is more supportive of infectious etiology rather than malignant.  Fluid from right gluteal area sent to micro and as of yet - no growth.  Continue zosyn for now.  3/13 Patient with slight improvement in mentation.  Blood cultures and abscess cultures are showing no growth at this time.  TTE being done while I examined patient - no evidence of vegetations on this test.  ID consultation pending - d/w Dr Garcia.  3/14 Patient feeling well today, her pain is present but not as bad as prior to admission.  She was able to follow the conversation today and is agreeable to move forward with the CHAS tomorrow.  I spoke with her brother, René, and updated him on condition yesterday afternoon also.  Will also have PICC placed in next few days as long as blood cultures remain negative as I expect a prolonged antibiotic course of treatment.  3/15 Patient without new complaints, states she needs help to move in bed.  CHAS showed no evidence of vegetations and regular diet resumed.  Culture remains negative and biopsy of brain lesion may prove needed - will watch through the weekend and if mentation does not continue to improve, will discuss with neurosurgery on Monday.  3/16 Patient states " she is okay today, mentation has waxed and waned without significant improvement.  She was febrile earlier today.  No other acute events.  3/17 Patient with significant improvement in mentation today, she is aware of her hospitalization, not falling asleep mid sentence.  Her pain mediations were held most of yesterday and likely far too strong for patient and were affecting her awareness.  Oxycodone 5 discontinued and will use tramadol instead unless pain not well controlled.  CT brain with contrast ordered as a quick scan for comparison to MRI.    3/18 Patient feeling same today, discussed need to discuss with neurosurgery for possible biopsy.  Discussed with Dr Nguyen, he reviewed MRI and CT brain and he feels they are likely metastatic lesions but are far too small to biopsy.  His recommendation is to continue with antibiotics and re-image in 2 weeks to see if any change that would be amenable to biopsy or that would further declare infection.    3/26 Patient mental status continues to wax and wane.  ID ordered MRI lumbar, pelvis and sacrum for evaluation for source of infection, patient with continued low back pain and no real improvement since I saw her 7 days prior with continued antibiotics during this time.  Cultures have not growth pathogen, repeat MRI 3/28.   3/27 Patient somnolent most of the day today, MRI's completed and showing 2 areas of fluid collections, given her history are likely abscesses and will require drainage, CT guided drainage requested and patient NPO at [facility] for this.  D/w ID - cultures will be done on fluid collection.  Repeat CT brain ordered for tomorrow.  3/28 Patient up to chair, diet resumed as lovenox given this am and drainage of abscesses deferred until tomorrow.  Vanco/merrem to continue and cultures will be collected from abscesses.  3/29 Patient went to IR today, had drain placed in the right buttock into abscess cavity and fluid sent for culture.  Potassium is slightly low,  continue with PO replacement.  HR has improved from yesterday but patient PO intake still not at normal level, increase IVF rate to 100cc/hour.  3/30 Patient without fever since drain placed right buttock.  Purulent material in ÁNGEL bulb. Cultures thus far are still showing no growth.  Antibiotics to continue.  CT head ordered to evaluate change in past 2 weeks as recommended by neurosurgery.  3/31 Patient with fevers overnight - was altered during this time but has recovered.  She denies pain when examined by me.  She still has purulent material in the drainage tubing.  Will continue with current IV antibiotics - 6 weeks total antibiotics suspected - end date would be 4/24/19.  D/w ID.    Consultants/Specialty  ID - santino/Alex    Code Status  full    Disposition  SNF in [place] after completion of abx.    Review of Systems  Review of Systems   Constitutional: Positive for fever. Negative for chills.   HENT: Negative for congestion and sore throat.    Eyes: Negative for blurred vision and photophobia.   Respiratory: Negative for cough and shortness of breath.    Cardiovascular: Negative for chest pain, claudication and leg swelling.   Gastrointestinal: Negative for abdominal pain, constipation, diarrhea, heartburn, nausea and vomiting.   Genitourinary: Negative for dysuria and hematuria.   Musculoskeletal: Positive for back pain and myalgias. Negative for joint pain.   Skin: Negative for itching and rash.   Neurological: Negative for dizziness, sensory change, speech change, weakness and headaches.   Psychiatric/Behavioral: Negative for depression. The patient is not nervous/anxious and does not have insomnia.         Physical Exam  Temp:  [36.4 °C (97.6 °F)-39.2 °C (102.6 °F)] 36.4 °C (97.6 °F)  Pulse:  [] 96  Resp:  [22-28] 22  BP: (118-156)/(60-85) 127/70  SpO2:  [94 %-97 %] 96 %    Physical Exam   Constitutional: She is oriented to person, place, and time. She appears well-developed and  well-nourished. No distress.   HENT:   Head: Normocephalic and atraumatic.   Eyes: Conjunctivae are normal. No scleral icterus.   Neck: Neck supple. No JVD present.   Cardiovascular: Normal rate, regular rhythm and normal heart sounds.  Exam reveals no gallop and no friction rub.    No murmur heard.  Pulmonary/Chest: Effort normal and breath sounds normal. No respiratory distress. She exhibits no tenderness.   Abdominal: Soft. Bowel sounds are normal. She exhibits no distension. There is no guarding.   Musculoskeletal: She exhibits no edema or tenderness.   Lymphadenopathy:     She has no cervical adenopathy.   Neurological: She is alert and oriented to person, place, and time. No cranial nerve deficit.   Skin: Skin is warm and dry. She is not diaphoretic. No erythema. No pallor.   Psychiatric: She has a normal mood and affect. Her behavior is normal.   Nursing note and vitals reviewed.      Fluids    Intake/Output Summary (Last 24 hours) at 03/31/19 1150  Last data filed at 03/31/19 0602   Gross per 24 hour   Intake              480 ml   Output               15 ml   Net              465 ml       Laboratory  Recent Labs      03/29/19   0647  03/30/19   0013  03/31/19   0038   WBC  3.4*  3.9*  4.2*   RBC  4.75  4.31  4.50   HEMOGLOBIN  12.1  11.0*  11.3*   HEMATOCRIT  38.6  35.2*  36.2*   MCV  81.3*  81.7  80.4*   MCH  25.5*  25.5*  25.1*   MCHC  31.3*  31.3*  31.2*   RDW  51.5*  51.1*  50.4*   PLATELETCT  116*  101*  90*   MPV  10.7  10.6  11.0     Recent Labs      03/29/19   0647  03/30/19   0013  03/31/19   0038   SODIUM  139  134*  133*   POTASSIUM  3.3*  3.1*  3.8   CHLORIDE  103  99  100   CO2  28  27  28   GLUCOSE  103*  111*  99   BUN  27*  28*  18   CREATININE  0.77  0.73  0.75   CALCIUM  10.6*  9.8  10.3                   Imaging  CT-HEAD WITH & W/O   Final Result      1.  Interval decrease in size and level of enhancement of multiple small punctate foci in both cerebral hemispheres and in the midbrain  consistent with improving septic emboli.      2.  No hemorrhage or mass effect.      CT-DRAIN-RETROPERITONEAL   Final Result      1.  CT GUIDED CATHETER DRAINAGE OF RIGHT GLUTEAL ABSCESS.   2.  THE CURRENT PLAN IS TO OBTAIN A FOLLOW-UP CT SCAN IN 5-7 DAYS IF INDICATED. ORDERS WERE WRITTEN FOR ONCE DAILY IRRIGATION OF THE CATHETER WITH 5 ML STERILE SALINE FOR 3 DAYS.   3. THE ILIOPSOAS COLLECTION WAS NOT AMENABLE TO CATHETER DRAINAGE AS INTERVENING BOWEL AND/OR BONY STRUCTURES OBSTRUCT A PATHWAY TO THIS COLLECTION AT THIS TIME.      MR-LUMBAR SPINE-WITH & W/O   Final Result      1.  There are multifocal enhancing fluid collections in the right iliopsoas muscles and gluteus muscles adjacent to the right ilium. Right iliopsoas fluid collection measures an approximately 3.9 x 2.9 cm. The right gluteal fluid collection measures an    approximately 3.5 x 2.8 cm in size. The differential diagnosis includes postsurgical seroma and abscess.   2.  Post operative and degenerative changes as described above.      MR-PELVIS-WITH & W/O AND SEQUENCES   Final Result      1.  Surgical screw traverses both sacroiliac joint across presumed pathologic fracture of the right sacrum and the hardware obscures the adjacent tissues.      2.  No other evidence for bony metastatic disease      3.  Right gluteal musculature rim-enhancing fluid collection measuring 3.4 x 2.8 cm. This could be a postoperative seroma versus abscess      4.  osteoarthritis of both hip joint      5.  Prior hysterectomy      IR-MIDLINE CATHETER INSERTION WO GUIDANCE > AGE 3   Final Result                  Ultrasound-guided midline placement performed by qualified nursing staff    as above.          CT-HEAD WITH   Final Result      Multiple subcentimeter too numerous to count enhancing masses scattered throughout the supratentorial and infratentorial brain. These demonstrate some surrounding vasogenic edema and most likely represent multifocal metastatic lesions.  Other    considerations would include multifocal abscesses or septic emboli.      EC-CHAS W/O CONT   Final Result      EC-CHAS W/O CONT   Final Result      CT-NEEDLE BX-MUSCLE RIGHT   Final Result      CT-guided aspiration of pus like material in the right gluteal muscle lesion.      EC-ECHOCARDIOGRAM COMPLETE W/O CONT   Final Result      OUTSIDE IMAGES-CT CHEST/ABDOMEN/PELVIS   Final Result      OUTSIDE IMAGES-DX CHEST   Final Result      [id number] FILM X-RAY   Final Result      OUTSIDE IMAGES-MR BRAIN   Final Result      OUTSIDE IMAGES-MR BRAIN   Final Result           Assessment/Plan  * Lesion of brain   Assessment & Plan    Metastatic cancer vs infectious etiology  D/w Dr Black for questionable brain mets, recommending IR Bx of psoas muscle   IR Bx of psoas muscle ordered - was abscess  Empiric treatment of infection  TTE and CHAS negative for vegetations.  D/w Dr Nguyen - lesions likely metastatic based on his interpretation of MRI/CT - too small to biopsy, recommends continuing antibiotics and re-imaging in 2 weeks to see if there has been any change.  CT brain to be repeated today       Encephalopathy   Assessment & Plan    Likely toxic metabolic from infection  Waxes and wanes         Mass of iliopsoas muscle group   Assessment & Plan    Return of fluid collection, repeat drainage in IR with cultures, drain placed 3/29 - 10ml purulent material drained and sent for culture - no growth a/o 3/30  Was on linezolid and cefepime and now on vanco/merrem - end date to be 6 weeks of treatment. (4/24/19)  ID consulting - d/w Dr Johnson           Normocytic anemia   Assessment & Plan    stable     RLS (restless legs syndrome)- (present on admission)   Assessment & Plan    Pramipexole       Diabetes mellitus type 2 in obese (HCC)- (present on admission)   Assessment & Plan    Well controlled   Short acting insulin as needed   Accu-Checks, hypoglycemia protocol          History of breast cancer- (present on  admission)   Assessment & Plan    Mets vs infection to brain  No known active breast cancer.       COPD (chronic obstructive pulmonary disease) (HCC)- (present on admission)   Assessment & Plan    Oxygen as needed, Respiratory protocol, Bronchodilators, Incentive spirometry      Urinary tract infection   Assessment & Plan    Culture remain no growth.       Fungal infection of the groin   Assessment & Plan    Started on nystatin powder 3/24     History of deep vein thrombosis- (present on admission)   Assessment & Plan    History of deep vein thrombosis   Was on Rivaroxaban as outpatient.     Held for Bx Mar 11  Restart AC after completing abx treatment         Essential hypertension- (present on admission)   Assessment & Plan    Restarted on losartan and furosemide with hold parameters.     GERD (gastroesophageal reflux disease)- (present on admission)   Assessment & Plan    Omeprazole     Chronic pain- (present on admission)   Assessment & Plan     Multifactorial  Likely secondary to abscess, fracture  Pain control            VTE prophylaxis: scds

## 2019-04-01 LAB
ANION GAP SERPL CALC-SCNC: 6 MMOL/L (ref 0–11.9)
BACTERIA WND AEROBE CULT: NORMAL
BASOPHILS # BLD AUTO: 0.3 % (ref 0–1.8)
BASOPHILS # BLD: 0.01 K/UL (ref 0–0.12)
BUN SERPL-MCNC: 16 MG/DL (ref 8–22)
CALCIUM SERPL-MCNC: 10.3 MG/DL (ref 8.5–10.5)
CHLORIDE SERPL-SCNC: 97 MMOL/L (ref 96–112)
CO2 SERPL-SCNC: 28 MMOL/L (ref 20–33)
CREAT SERPL-MCNC: 0.76 MG/DL (ref 0.5–1.4)
EOSINOPHIL # BLD AUTO: 0.05 K/UL (ref 0–0.51)
EOSINOPHIL NFR BLD: 1.3 % (ref 0–6.9)
ERYTHROCYTE [DISTWIDTH] IN BLOOD BY AUTOMATED COUNT: 49.8 FL (ref 35.9–50)
GLUCOSE SERPL-MCNC: 103 MG/DL (ref 65–99)
GRAM STN SPEC: NORMAL
HCT VFR BLD AUTO: 38.3 % (ref 37–47)
HGB BLD-MCNC: 11.8 G/DL (ref 12–16)
IMM GRANULOCYTES # BLD AUTO: 0.01 K/UL (ref 0–0.11)
IMM GRANULOCYTES NFR BLD AUTO: 0.3 % (ref 0–0.9)
LYMPHOCYTES # BLD AUTO: 0.7 K/UL (ref 1–4.8)
LYMPHOCYTES NFR BLD: 17.8 % (ref 22–41)
MCH RBC QN AUTO: 24.7 PG (ref 27–33)
MCHC RBC AUTO-ENTMCNC: 30.8 G/DL (ref 33.6–35)
MCV RBC AUTO: 80.1 FL (ref 81.4–97.8)
MONOCYTES # BLD AUTO: 0.64 K/UL (ref 0–0.85)
MONOCYTES NFR BLD AUTO: 16.2 % (ref 0–13.4)
NEUTROPHILS # BLD AUTO: 2.53 K/UL (ref 2–7.15)
NEUTROPHILS NFR BLD: 64.1 % (ref 44–72)
NRBC # BLD AUTO: 0 K/UL
NRBC BLD-RTO: 0 /100 WBC
PLATELET # BLD AUTO: 123 K/UL (ref 164–446)
PMV BLD AUTO: 12.1 FL (ref 9–12.9)
POTASSIUM SERPL-SCNC: 3.5 MMOL/L (ref 3.6–5.5)
RBC # BLD AUTO: 4.78 M/UL (ref 4.2–5.4)
SIGNIFICANT IND 70042: NORMAL
SITE SITE: NORMAL
SODIUM SERPL-SCNC: 131 MMOL/L (ref 135–145)
SOURCE SOURCE: NORMAL
VANCOMYCIN TROUGH SERPL-MCNC: 18.3 UG/ML (ref 10–20)
WBC # BLD AUTO: 3.9 K/UL (ref 4.8–10.8)

## 2019-04-01 PROCEDURE — A9270 NON-COVERED ITEM OR SERVICE: HCPCS | Performed by: INTERNAL MEDICINE

## 2019-04-01 PROCEDURE — 700102 HCHG RX REV CODE 250 W/ 637 OVERRIDE(OP): Performed by: INTERNAL MEDICINE

## 2019-04-01 PROCEDURE — 700105 HCHG RX REV CODE 258: Performed by: INTERNAL MEDICINE

## 2019-04-01 PROCEDURE — A9270 NON-COVERED ITEM OR SERVICE: HCPCS | Performed by: HOSPITALIST

## 2019-04-01 PROCEDURE — 80202 ASSAY OF VANCOMYCIN: CPT

## 2019-04-01 PROCEDURE — 700102 HCHG RX REV CODE 250 W/ 637 OVERRIDE(OP): Performed by: HOSPITALIST

## 2019-04-01 PROCEDURE — 99232 SBSQ HOSP IP/OBS MODERATE 35: CPT | Performed by: INTERNAL MEDICINE

## 2019-04-01 PROCEDURE — 700111 HCHG RX REV CODE 636 W/ 250 OVERRIDE (IP): Performed by: HOSPITALIST

## 2019-04-01 PROCEDURE — 36415 COLL VENOUS BLD VENIPUNCTURE: CPT

## 2019-04-01 PROCEDURE — 85025 COMPLETE CBC W/AUTO DIFF WBC: CPT

## 2019-04-01 PROCEDURE — 700111 HCHG RX REV CODE 636 W/ 250 OVERRIDE (IP): Performed by: INTERNAL MEDICINE

## 2019-04-01 PROCEDURE — 80048 BASIC METABOLIC PNL TOTAL CA: CPT

## 2019-04-01 PROCEDURE — 97535 SELF CARE MNGMENT TRAINING: CPT

## 2019-04-01 PROCEDURE — 770004 HCHG ROOM/CARE - ONCOLOGY PRIVATE *

## 2019-04-01 RX ORDER — POTASSIUM CHLORIDE 20 MEQ/1
40 TABLET, EXTENDED RELEASE ORAL ONCE
Status: COMPLETED | OUTPATIENT
Start: 2019-04-01 | End: 2019-04-01

## 2019-04-01 RX ADMIN — POTASSIUM CHLORIDE 40 MEQ: 1500 TABLET, EXTENDED RELEASE ORAL at 04:40

## 2019-04-01 RX ADMIN — MEROPENEM 2 G: 1 INJECTION, POWDER, FOR SOLUTION INTRAVENOUS at 20:27

## 2019-04-01 RX ADMIN — OXYCODONE HYDROCHLORIDE 2.5 MG: 5 TABLET ORAL at 14:49

## 2019-04-01 RX ADMIN — POTASSIUM CHLORIDE 40 MEQ: 1500 TABLET, EXTENDED RELEASE ORAL at 12:03

## 2019-04-01 RX ADMIN — SODIUM CHLORIDE: 9 INJECTION, SOLUTION INTRAVENOUS at 17:06

## 2019-04-01 RX ADMIN — OXYCODONE HYDROCHLORIDE 2.5 MG: 5 TABLET ORAL at 20:39

## 2019-04-01 RX ADMIN — MEROPENEM 2 G: 1 INJECTION, POWDER, FOR SOLUTION INTRAVENOUS at 08:00

## 2019-04-01 RX ADMIN — FUROSEMIDE 40 MG: 40 TABLET ORAL at 04:41

## 2019-04-01 RX ADMIN — VANCOMYCIN HYDROCHLORIDE 700 MG: 100 INJECTION, POWDER, LYOPHILIZED, FOR SOLUTION INTRAVENOUS at 17:46

## 2019-04-01 RX ADMIN — LOSARTAN POTASSIUM 50 MG: 50 TABLET ORAL at 04:41

## 2019-04-01 RX ADMIN — NYSTATIN: 100000 POWDER TOPICAL at 04:42

## 2019-04-01 RX ADMIN — GABAPENTIN 300 MG: 300 CAPSULE ORAL at 16:29

## 2019-04-01 RX ADMIN — SODIUM CHLORIDE: 9 INJECTION, SOLUTION INTRAVENOUS at 06:27

## 2019-04-01 RX ADMIN — LOPERAMIDE HYDROCHLORIDE 2 MG: 2 CAPSULE ORAL at 12:21

## 2019-04-01 RX ADMIN — GABAPENTIN 300 MG: 300 CAPSULE ORAL at 04:41

## 2019-04-01 RX ADMIN — POTASSIUM CHLORIDE 40 MEQ: 1500 TABLET, EXTENDED RELEASE ORAL at 02:59

## 2019-04-01 RX ADMIN — POTASSIUM CHLORIDE 40 MEQ: 1500 TABLET, EXTENDED RELEASE ORAL at 16:29

## 2019-04-01 RX ADMIN — OXYCODONE HYDROCHLORIDE 2.5 MG: 5 TABLET ORAL at 23:58

## 2019-04-01 RX ADMIN — FUROSEMIDE 40 MG: 40 TABLET ORAL at 16:29

## 2019-04-01 RX ADMIN — ACETAMINOPHEN 650 MG: 325 TABLET, FILM COATED ORAL at 15:06

## 2019-04-01 RX ADMIN — OMEPRAZOLE 20 MG: 20 CAPSULE, DELAYED RELEASE ORAL at 04:41

## 2019-04-01 RX ADMIN — NYSTATIN: 100000 POWDER TOPICAL at 17:04

## 2019-04-01 RX ADMIN — MORPHINE SULFATE 2 MG: 4 INJECTION INTRAVENOUS at 23:01

## 2019-04-01 ASSESSMENT — ENCOUNTER SYMPTOMS
HEADACHES: 0
COUGH: 0
WEAKNESS: 1
CONSTIPATION: 0
SPEECH CHANGE: 0
PHOTOPHOBIA: 0
PALPITATIONS: 0
FEVER: 0
BLURRED VISION: 0
DIARRHEA: 0
CHILLS: 0
BACK PAIN: 0
ABDOMINAL PAIN: 0
WEAKNESS: 0
NERVOUS/ANXIOUS: 0
VOMITING: 0
NAUSEA: 0
CLAUDICATION: 0
INSOMNIA: 0
BACK PAIN: 1
SENSORY CHANGE: 0
HEARTBURN: 0
SHORTNESS OF BREATH: 0
WHEEZING: 0
STRIDOR: 0
DEPRESSION: 0
DIZZINESS: 0
MYALGIAS: 1
SORE THROAT: 0

## 2019-04-01 ASSESSMENT — COGNITIVE AND FUNCTIONAL STATUS - GENERAL
HELP NEEDED FOR BATHING: A LOT
DAILY ACTIVITIY SCORE: 15
PERSONAL GROOMING: A LITTLE
TOILETING: A LOT
EATING MEALS: A LITTLE
SUGGESTED CMS G CODE MODIFIER DAILY ACTIVITY: CK
DRESSING REGULAR LOWER BODY CLOTHING: A LOT
DRESSING REGULAR UPPER BODY CLOTHING: A LITTLE

## 2019-04-01 NOTE — PROGRESS NOTES
"[facility] Medicine Daily Progress Note    Date of Service  4/1/2019    Chief Complaint  68 y.o. female admitted 3/8/2019 with right hip pain, fever and abnormal brain mri.    Hospital Course    Patient started on empiric antibiotics given fever.  She had abnormal findings on MRI brain and CT showed \"lesion\" on right iliopsoas.  This lesion was biopsied and turned out to be abscess.  She has history of breast cancer and there is also concern of brain lesions being metastatic though their appearance is not typical of this.      Interval Problem Update  3/12 Discussed with Dr Turner for second opinion of brain lesions and based on appearance not able to r/o or r/i any diagnosis.  Given patient's presentation of confusion, hip pain and fever  - this is more supportive of infectious etiology rather than malignant.  Fluid from right gluteal area sent to micro and as of yet - no growth.  Continue zosyn for now.  3/13 Patient with slight improvement in mentation.  Blood cultures and abscess cultures are showing no growth at this time.  TTE being done while I examined patient - no evidence of vegetations on this test.  ID consultation pending - d/w Dr Garcia.  3/14 Patient feeling well today, her pain is present but not as bad as prior to admission.  She was able to follow the conversation today and is agreeable to move forward with the CHAS tomorrow.  I spoke with her brother, René, and updated him on condition yesterday afternoon also.  Will also have PICC placed in next few days as long as blood cultures remain negative as I expect a prolonged antibiotic course of treatment.  3/15 Patient without new complaints, states she needs help to move in bed.  CHAS showed no evidence of vegetations and regular diet resumed.  Culture remains negative and biopsy of brain lesion may prove needed - will watch through the weekend and if mentation does not continue to improve, will discuss with neurosurgery on Monday.  3/16 Patient states she " is okay today, mentation has waxed and waned without significant improvement.  She was febrile earlier today.  No other acute events.  3/17 Patient with significant improvement in mentation today, she is aware of her hospitalization, not falling asleep mid sentence.  Her pain mediations were held most of yesterday and likely far too strong for patient and were affecting her awareness.  Oxycodone 5 discontinued and will use tramadol instead unless pain not well controlled.  CT brain with contrast ordered as a quick scan for comparison to MRI.    3/18 Patient feeling same today, discussed need to discuss with neurosurgery for possible biopsy.  Discussed with Dr Nguyen, he reviewed MRI and CT brain and he feels they are likely metastatic lesions but are far too small to biopsy.  His recommendation is to continue with antibiotics and re-image in 2 weeks to see if any change that would be amenable to biopsy or that would further declare infection.    3/26 Patient mental status continues to wax and wane.  ID ordered MRI lumbar, pelvis and sacrum for evaluation for source of infection, patient with continued low back pain and no real improvement since I saw her 7 days prior with continued antibiotics during this time.  Cultures have not growth pathogen, repeat MRI 3/28.   3/27 Patient somnolent most of the day today, MRI's completed and showing 2 areas of fluid collections, given her history are likely abscesses and will require drainage, CT guided drainage requested and patient NPO at [facility] for this.  D/w ID - cultures will be done on fluid collection.  Repeat CT brain ordered for tomorrow.  3/28 Patient up to chair, diet resumed as lovenox given this am and drainage of abscesses deferred until tomorrow.  Vanco/merrem to continue and cultures will be collected from abscesses.  3/29 Patient went to IR today, had drain placed in the right buttock into abscess cavity and fluid sent for culture.  Potassium is slightly low,  continue with PO replacement.  HR has improved from yesterday but patient PO intake still not at normal level, increase IVF rate to 100cc/hour.  3/30 Patient without fever since drain placed right buttock.  Purulent material in ÁNGEL bulb. Cultures thus far are still showing no growth.  Antibiotics to continue.  CT head ordered to evaluate change in past 2 weeks as recommended by neurosurgery.  3/31 Patient with fevers overnight - was altered during this time but has recovered.  She denies pain when examined by me.  She still has purulent material in the drainage tubing.  Will continue with current IV antibiotics - 6 weeks total antibiotics suspected - end date would be 4/24/19.  D/w ID.  4/1 Patient remains intermittently somnolent.  She wakes easily but falls back to sleep quickly.  She remained afebrile overnight.  ÁNGEL drain continues to drain purulent material.  ABX through 4/24 - once accepting facility found, patient okay to transfer with midline intact for completion of antibiotics.    Consultants/Specialty  ID - santino/Alex    Code Status  full    Disposition  SNF in [place] to complete abx.    Review of Systems  Review of Systems   Constitutional: Negative for chills and fever.   HENT: Negative for congestion and sore throat.    Eyes: Negative for blurred vision and photophobia.   Respiratory: Negative for cough and shortness of breath.    Cardiovascular: Negative for chest pain, claudication and leg swelling.   Gastrointestinal: Negative for abdominal pain, constipation, diarrhea, heartburn, nausea and vomiting.   Genitourinary: Negative for dysuria and hematuria.   Musculoskeletal: Positive for myalgias. Negative for back pain and joint pain.   Skin: Negative for itching and rash.   Neurological: Negative for dizziness, sensory change, speech change, weakness and headaches.   Psychiatric/Behavioral: Negative for depression. The patient is not nervous/anxious and does not have insomnia.         Physical  Exam  Temp:  [36.6 °C (97.9 °F)-37.3 °C (99.1 °F)] 36.9 °C (98.4 °F)  Pulse:  [] 96  Resp:  [18-20] 20  BP: (128-151)/(71-91) 128/91  SpO2:  [95 %-99 %] 98 %    Physical Exam   Constitutional: She is oriented to person, place, and time. She appears well-developed and well-nourished. No distress.   HENT:   Head: Normocephalic and atraumatic.   Eyes: Conjunctivae are normal. No scleral icterus.   Neck: Neck supple. No JVD present.   Cardiovascular: Normal rate, regular rhythm and normal heart sounds.  Exam reveals no gallop and no friction rub.    No murmur heard.  Pulmonary/Chest: Effort normal and breath sounds normal. No respiratory distress. She exhibits no tenderness.   Abdominal: Soft. Bowel sounds are normal. She exhibits no distension. There is no guarding.   Musculoskeletal: She exhibits no edema or tenderness.   Lymphadenopathy:     She has no cervical adenopathy.   Neurological: She is alert and oriented to person, place, and time. No cranial nerve deficit.   Skin: Skin is warm and dry. She is not diaphoretic. No erythema. No pallor.   Psychiatric: She has a normal mood and affect. Her behavior is normal.   Nursing note and vitals reviewed.      Fluids    Intake/Output Summary (Last 24 hours) at 04/01/19 1408  Last data filed at 04/01/19 1300   Gross per 24 hour   Intake                0 ml   Output               20 ml   Net              -20 ml       Laboratory  Recent Labs      03/30/19   0013  03/31/19   0038  04/01/19   0025   WBC  3.9*  4.2*  3.9*   RBC  4.31  4.50  4.78   HEMOGLOBIN  11.0*  11.3*  11.8*   HEMATOCRIT  35.2*  36.2*  38.3   MCV  81.7  80.4*  80.1*   MCH  25.5*  25.1*  24.7*   MCHC  31.3*  31.2*  30.8*   RDW  51.1*  50.4*  49.8   PLATELETCT  101*  90*  123*   MPV  10.6  11.0  12.1     Recent Labs      03/30/19   0013  03/31/19   0038  04/01/19   0025   SODIUM  134*  133*  131*   POTASSIUM  3.1*  3.8  3.5*   CHLORIDE  99  100  97   CO2  27  28  28   GLUCOSE  111*  99  103*   BUN  28*   18  16   CREATININE  0.73  0.75  0.76   CALCIUM  9.8  10.3  10.3                   Imaging  CT-HEAD WITH & W/O   Final Result      1.  Interval decrease in size and level of enhancement of multiple small punctate foci in both cerebral hemispheres and in the midbrain consistent with improving septic emboli.      2.  No hemorrhage or mass effect.      CT-DRAIN-RETROPERITONEAL   Final Result      1.  CT GUIDED CATHETER DRAINAGE OF RIGHT GLUTEAL ABSCESS.   2.  THE CURRENT PLAN IS TO OBTAIN A FOLLOW-UP CT SCAN IN 5-7 DAYS IF INDICATED. ORDERS WERE WRITTEN FOR ONCE DAILY IRRIGATION OF THE CATHETER WITH 5 ML STERILE SALINE FOR 3 DAYS.   3. THE ILIOPSOAS COLLECTION WAS NOT AMENABLE TO CATHETER DRAINAGE AS INTERVENING BOWEL AND/OR BONY STRUCTURES OBSTRUCT A PATHWAY TO THIS COLLECTION AT THIS TIME.      MR-LUMBAR SPINE-WITH & W/O   Final Result      1.  There are multifocal enhancing fluid collections in the right iliopsoas muscles and gluteus muscles adjacent to the right ilium. Right iliopsoas fluid collection measures an approximately 3.9 x 2.9 cm. The right gluteal fluid collection measures an    approximately 3.5 x 2.8 cm in size. The differential diagnosis includes postsurgical seroma and abscess.   2.  Post operative and degenerative changes as described above.      MR-PELVIS-WITH & W/O AND SEQUENCES   Final Result      1.  Surgical screw traverses both sacroiliac joint across presumed pathologic fracture of the right sacrum and the hardware obscures the adjacent tissues.      2.  No other evidence for bony metastatic disease      3.  Right gluteal musculature rim-enhancing fluid collection measuring 3.4 x 2.8 cm. This could be a postoperative seroma versus abscess      4.  osteoarthritis of both hip joint      5.  Prior hysterectomy      IR-MIDLINE CATHETER INSERTION WO GUIDANCE > AGE 3   Final Result                  Ultrasound-guided midline placement performed by qualified nursing staff    as above.           CT-HEAD WITH   Final Result      Multiple subcentimeter too numerous to count enhancing masses scattered throughout the supratentorial and infratentorial brain. These demonstrate some surrounding vasogenic edema and most likely represent multifocal metastatic lesions. Other    considerations would include multifocal abscesses or septic emboli.      EC-CHAS W/O CONT   Final Result      EC-CHAS W/O CONT   Final Result      CT-NEEDLE BX-MUSCLE RIGHT   Final Result      CT-guided aspiration of pus like material in the right gluteal muscle lesion.      EC-ECHOCARDIOGRAM COMPLETE W/O CONT   Final Result      OUTSIDE IMAGES-CT CHEST/ABDOMEN/PELVIS   Final Result      OUTSIDE IMAGES-DX CHEST   Final Result      ZB-[id number] FILM X-RAY   Final Result      OUTSIDE IMAGES-MR BRAIN   Final Result      OUTSIDE IMAGES-MR BRAIN   Final Result           Assessment/Plan  * Lesion of brain   Assessment & Plan    Metastatic cancer vs infectious etiology  D/w Dr Black for questionable brain mets, recommending IR Bx of psoas muscle   IR Bx of psoas muscle ordered - was abscess  Empiric treatment of infection  TTE and CHAS negative for vegetations.  D/w Dr Nguyen - lesions likely metastatic based on his interpretation of MRI/CT - too small to biopsy, recommends continuing antibiotics and re-imaging in 2 weeks to see if there has been any change.  CT brain to be repeated - lesions shrinking and decreased in overall number - c/w septic emboli       Encephalopathy   Assessment & Plan    Likely toxic metabolic from infection  Waxes and wanes         Mass of iliopsoas muscle group   Assessment & Plan    Return of fluid collection, repeat drainage in IR with cultures, drain placed 3/29 - 10ml purulent material drained and sent for culture - no growth a/o 3/30  Was on linezolid and cefepime and now on vanco/merrem - end date to be 6 weeks of treatment. (4/24/19)  ID consulting - d/w Dr Johnson           Normocytic anemia   Assessment &  Plan    stable     RLS (restless legs syndrome)- (present on admission)   Assessment & Plan    Pramipexole       Diabetes mellitus type 2 in obese (HCC)- (present on admission)   Assessment & Plan    Well controlled   Short acting insulin as needed   Accu-Checks, hypoglycemia protocol          History of breast cancer- (present on admission)   Assessment & Plan    Mets vs infection to brain  No known active breast cancer.       COPD (chronic obstructive pulmonary disease) ([facility])- (present on admission)   Assessment & Plan    Oxygen as needed, Respiratory protocol, Bronchodilators, Incentive spirometry      Urinary tract infection   Assessment & Plan    Culture remain no growth.       Fungal infection of the groin   Assessment & Plan    Started on nystatin powder 3/24     History of deep vein thrombosis- (present on admission)   Assessment & Plan    History of deep vein thrombosis   Was on Rivaroxaban as outpatient.     Held for Bx Mar 11  Restart AC after completing abx treatment         Essential hypertension- (present on admission)   Assessment & Plan    Restarted on losartan and furosemide with hold parameters.     GERD (gastroesophageal reflux disease)- (present on admission)   Assessment & Plan    Omeprazole     Chronic pain- (present on admission)   Assessment & Plan     Multifactorial  Likely secondary to abscess, fracture  Pain control            VTE prophylaxis: scds

## 2019-04-01 NOTE — PROGRESS NOTES
Infectious Disease Progress Note    Author: Beatriz Johnson M.D. Date & Time of service: 4/1/2019  4:08 PM    Chief Complaint:  Multiple brain lesions  Iliopsoas lesion    Interval History:  68-year-old female with history of diabetes and breast cancer, admitted 3/8/2019 with abdominal pain, iliopsoas lesion, and an abnormal MRI with multiple brain lesions.  3/14 AF (99.2) WBC 5.8 more awake today as compared to yesterday but not oriented-denies pain, following commands. ECHO reviewed  3/15 AF (99.1) no WBC today Had CHAS today  3/16 temp 100.5 WBC 7.9 more confused today-naked but pushing off sheets  3/17 AF WBC not done Less obtunded today-not oriented. Denies pain, N/V, HA, visual changes etc  3/18 afebrile WBC 6 patient sitting up in bed eating breakfast, she is quite soft spoken.  She denies any headache, nausea or vomiting.  Endorses a mild cough.  3/19 AF pt denies any new symptoms, no HA. Brain lesions too small for biopsy per notes. Neurosurgery recommends repeating scan in 2 weeks.  3/20 afebrile patient is sleeping and difficult to arouse  3/21 patient is sitting up in bed and is alert and awake.  She however appears to have some waxing confusion but no she is in the hospital in [place].  Complains of lower back pain but no headache, nausea or vomiting  3/22 AF no CBC, continues to have bowel incontinence but not watery per RN, pt has no new complaints today, not engaged in conversation  3/23 afebrile WBC 6.3 patient complaining of stool incontinence and diarrhea, midline placed yesterday  3/24 afebrile patient rested comfortably without any new complaints  3/25 T- max 98.5, no new labs since 03/23. Reports mild HA and weakness. Emotional when asked about hx of hip fx.  3/26 afebrile, wbc 8.0. Reports feeling agitated. Per RN appeared confused throughout the night  3/27 no change overnight. RN reports agitation overnight. Pt sleeping, difficult to wake this am  3/28 reports slight improvement from yesterday.  Moving LE easier after abscess drained by primary team.   3/29/2019 no fevers.  WBC is down to 3.4.  Underwent drainage from the right gluteal area.  It was purulent.  3/30/2019-no fevers.  Remains slightly confused.  WBC is 3.9  3/31/2019 had fevers up to 102.6.  Complains of some right hand weakness.  No new issues overnight.  Family at bedside.  2019 no fevers.  Easily arousable.  ÁNGEL continues to drain.  Labs Reviewed    Review of Systems:  Review of Systems   Constitutional: Negative for chills and fever.   HENT: Negative for sore throat.    Respiratory: Negative for cough, shortness of breath, wheezing and stridor.    Cardiovascular: Negative for chest pain and palpitations.   Gastrointestinal: Negative for abdominal pain, diarrhea, nausea and vomiting.   Genitourinary: Negative for dysuria.   Musculoskeletal: Positive for back pain.        Improving   Skin: Negative for itching and rash.   Neurological: Positive for weakness. Negative for dizziness and headaches.        Says she is having difficulty finding words and complains of some right-sided hand weakness   Limited review of systems secondary to intermittent confusion    Hemodynamics:  Temp (24hrs), Av.9 °C (98.5 °F), Min:36.6 °C (97.9 °F), Max:37.3 °C (99.1 °F)  Temperature: 36.9 °C (98.4 °F)  Pulse  Av.4  Min: 70  Max: 153  Blood Pressure : 128/91       Physical Exam:  Physical Exam   Constitutional: No distress.   HENT:   Head: Normocephalic and atraumatic.   Mouth/Throat: Oropharynx is clear and moist. No oropharyngeal exudate.   Eyes: Pupils are equal, round, and reactive to light. EOM are normal. Right eye exhibits no discharge. Left eye exhibits no discharge.   Neck: Neck supple. No JVD present.   Cardiovascular: Normal rate and intact distal pulses.    Murmur heard.  Pulmonary/Chest: Effort normal. No respiratory distress. She has no wheezes.   Abdominal: Soft. Bowel sounds are normal. She exhibits no distension. There is no  tenderness.   Musculoskeletal: She exhibits no tenderness or deformity.   Right-sided hip drain with purulent drainage   Lymphadenopathy:     She has no cervical adenopathy.   Neurological: She is alert.   Moving all extremities  Answering appropriately   Skin: Skin is warm. No rash noted. She is not diaphoretic.   Well-healed left breast mastectomy scar   Nursing note and vitals reviewed.      Meds:    Current Facility-Administered Medications:   •  loperamide  •  potassium chloride SA  •  vancomycin  •  meropenem  •  MD Alert...Vancomycin per Pharmacy  •  nystatin  •  NS  •  labetalol  •  ibuprofen  •  haloperidol lactate  •  pramipexole  •  furosemide  •  tramadol  •  losartan  •  gabapentin  •  omeprazole  •  Respiratory Care per Protocol  •  acetaminophen  •  Notify provider if pain remains uncontrolled **AND** Use the numeric rating scale (NRS-11) on regular floors and Critical-Care Pain Observation Tool (CPOT) on ICUs/Trauma to assess pain **AND** Pulse Ox (Oximetry) **AND** Pharmacy Consult Request **AND** If patient difficult to arouse and/or has respiratory depression, stop any opiates that are currently infusing and call a Rapid Response. **AND** oxyCODONE immediate-release **AND** [DISCONTINUED] oxyCODONE immediate-release **AND** morphine injection  •  ondansetron  •  ondansetron    Labs:  Recent Labs      03/30/19 0013  03/31/19 0038  04/01/19   0025   WBC  3.9*  4.2*  3.9*   RBC  4.31  4.50  4.78   HEMOGLOBIN  11.0*  11.3*  11.8*   HEMATOCRIT  35.2*  36.2*  38.3   MCV  81.7  80.4*  80.1*   MCH  25.5*  25.1*  24.7*   RDW  51.1*  50.4*  49.8   PLATELETCT  101*  90*  123*   MPV  10.6  11.0  12.1   NEUTSPOLYS  57.80  71.00  64.10   LYMPHOCYTES  25.00  14.50*  17.80*   MONOCYTES  15.30*  13.60*  16.20*   EOSINOPHILS  0.80  0.70  1.30   BASOPHILS  0.80  0.00  0.30     Recent Labs      03/30/19   0013  03/31/19 0038  04/01/19   0025   SODIUM  134*  133*  131*   POTASSIUM  3.1*  3.8  3.5*   CHLORIDE  99   100  97   CO2  27  28  28   GLUCOSE  111*  99  103*   BUN  28*  18  16     Recent Labs      03/30/19   0013  03/31/19   0038  04/01/19   0025   CREATININE  0.73  0.75  0.76       Imaging:  Ct-needle Bx-muscle Right    Result Date: 3/13/2019  3/11/2019 4:44 PM HISTORY/REASON FOR EXAM:  Right gluteal hypodense lesion. Moderate sedation was administered with continuous monitoring of the patient under the direct supervision of  Medications: 2 mg of Versed and 200 mcg of fentanyl Total sedation time 60 minutes Procedure: After the informed consent the patient was placed on the CT table on prone position. Localization CT scan was obtained. It demonstrates hypodense area in the right gluteus muscle. Subsequently a 17-gauge needle was advanced into the lesion. 20  mL of pus was aspirated. The materials were sent to the pathology. The patient tolerated the procedure without any immediate competition.     CT-guided aspiration of pus like material in the right gluteal muscle lesion.    Ec-echocardiogram Complete W/o Cont    Result Date: 3/13/2019  Transthoracic Echo Report Echocardiography Laboratory CONCLUSIONS No prior study is available for comparison. Normal left ventricular systolic function. Left ventricular ejection fraction is visually estimated to be 65%. Normal diastolic function. Normal inferior vena cava size and inspiratory collapse. Aortic sclerosis without stenosis. Estimated right ventricular systolic pressure  is 33 mmHg. No obvious valvular vegetations are noted on a decent quality study.  If further valvular assessment is clinically indicated, consider transesophageal echocardiogram. SAM,  LV EF:  65    % FINDINGS Left Ventricle Normal left ventricular size and systolic function. Normal left ventricular wall thickness. Left ventricular ejection fraction is visually estimated to be 65%. Normal diastolic function. Right Ventricle Normal right ventricular size and systolic function. Right  "Atrium Normal right atrial size. Normal inferior vena cava size and inspiratory collapse. Left Atrium Normal left atrial size. Mitral Valve Structurally normal mitral valve without significant stenosis or regurgitation. Aortic Valve Aortic sclerosis without stenosis. No aortic insufficiency. Tricuspid Valve Structurally normal tricuspid valve. Mild tricuspid regurgitation. Right atrial pressure is estimated to be 3 mmHg. Estimated right ventricular systolic pressure  is 33 mmHg. Pulmonic Valve The pulmonic valve is not well visualized. No pulmonic insufficiency. Pericardium Normal pericardium without effusion. Aorta The aortic root is normal.  Ascending aorta diameter is 3.0 cm. Claire Tripathi MD (Electronically Signed) Final Date:     13 March 2019                 14:38      Micro:  Results     Procedure Component Value Units Date/Time    BLOOD CULTURE [[id number]] Collected:  03/31/19 1504    Order Status:  Completed Specimen:  Blood from Peripheral Updated:  04/01/19 0854     Significant Indicator NEG     Source BLD     Site PERIPHERAL     Blood Culture No Growth    Note: Blood cultures are incubated for 5 days and  are monitored continuously.Positive blood cultures  are called to the RN and reported as soon as  they are identified.      Narrative:       Per Hospital Policy: Only change Specimen Src: to \"Line\" if  specified by physician order.    BLOOD CULTURE [[id number]] Collected:  03/31/19 1504    Order Status:  Completed Specimen:  Blood from Peripheral Updated:  04/01/19 0854     Significant Indicator NEG     Source BLD     Site PERIPHERAL     Blood Culture No Growth    Note: Blood cultures are incubated for 5 days and  are monitored continuously.Positive blood cultures  are called to the RN and reported as soon as  they are identified.      Narrative:       Per Hospital Policy: Only change Specimen Src: to \"Line\" if  specified by physician order.    CULTURE WOUND W/ GRAM STAIN [[id number]] Collected:  03/29/19 " 1030    Order Status:  Completed Specimen:  Wound Updated:  04/01/19 0841     Significant Indicator NEG     Source WND     Site Right Buttock     Culture Result Wound No growth at 72 hours     Gram Stain Result Moderate WBCs.  No organisms seen.      Narrative:       Collected By:[id number] JUDITH DAV PICKENS  RIGHT Gluteal fluid aspirate x1 ml, collected by Dr. Persaud,  ordered by Dr. Keller, for gram stain and C&S.    GRAM STAIN [[id number]] Collected:  03/29/19 1030    Order Status:  Completed Specimen:  Wound Updated:  03/29/19 1401     Significant Indicator .     Source WND     Site Right Buttock     Gram Stain Result Moderate WBCs.  No organisms seen.      Narrative:       Collected By:[id number] JUDITH DAV CELIO  RIGHT Gluteal fluid aspirate x1 ml, collected by Dr. Persaud,  ordered by Dr. Keller, for gram stain and C&S.    FLUID CULTURE W/GRAM STAIN [[id number]] Collected:  03/29/19 1030    Order Status:  Canceled Specimen:  Other from Other Body Fluid           Assessment:  Active Hospital Problems    Diagnosis   • *Lesion of brain [G93.9]   • Mass of iliopsoas muscle group [M62.89]   • Encephalopathy [G93.40]   • Hyponatremia [E87.1]   • History of breast cancer [Z85.3]   • Diabetes mellitus type 2 in obese (HCC) [E11.69, E66.9]       Plan:  Multiple brain lesions  Low-grade fevers resolved  Wbc 4.5  Confusion resolving  MRI with scattered small lesions incl aaron, too small to biopsy per neurosurgery  TTE neg; CHAS neg  Bcxs neg to date  Repeat CT scan on 3/30/2019 shows decrease in the size of the abscesses  Repeat MRI prior to completion of abx    Iliopsoas mass, on treatment  CT + 2.6 cm lesion in the right iliopsoas region  S/p aspiration +WBCs-cultures negative to date  Bcxs remain neg  D/c'd Vanco secondary to elevated Vanco troughs 03/23 and patient not clearing well, restarted 03/25  D/c cefepime 03/26 - may be contributing to confusion  Tolerating Meropenem started 03/26,  Midline pending  MRI w/ contrast lumbar, pelvis  03/27 - Iliopsoas and gluteal fluid collections noted.    Drain placed 3/29/2019  Cultures are negative so far  Aim for at least 4-6 weeks of antibiotics    New fevers  Resolving  Blood cultures x2 were negative from 3/31/2019    H/o breast cancer  Markedly elevated alk phos  Recommend continued diaz for mets    Type 2 diabetes mellitus  Hemoglobin A1c 6.3  Keep BS under 150 to help control current infection    I have performed a physical exam and reviewed and updated ROS and plan today 4/1/2019.  In review of yesterday's note 3/31/2019, there are no changes except as documented above.      Discussed with Dr. Keller

## 2019-04-01 NOTE — CARE PLAN
Problem: Safety  Goal: Will remain free from injury  Outcome: PROGRESSING AS EXPECTED  Treaded socks in place, bed in the lowest position, bed alarm on, call light and belongings within reach, pt call for assistance appropriately    Problem: Skin Integrity  Goal: Risk for impaired skin integrity will decrease  Outcome: PROGRESSING AS EXPECTED  q2 turns, [place] risk assessment, applied barrier cream    Problem: Pain Management  Goal: Pain level will decrease to patient's comfort goal  Outcome: PROGRESSING AS EXPECTED  Denies pain at this time, hourly rounding in progress

## 2019-04-01 NOTE — PROGRESS NOTES
Notified MD of pt's recent lab result of Na 131, K 3.5. No new orders received. Will continue to monitor.

## 2019-04-01 NOTE — PROGRESS NOTES
Pharmacy Kinetics 68 y.o. female on vancomycin day # 8 2019    Currently on Vancomycin 700 mg iv q24hr    Indication for Treatment:  empiric for possible CNS infection     Pertinent history per medical record: Admitted on 3/8/2019 for iliopsoas mass and multiple brain lesions.  There is concern that this is infectious.  All cultures are negative to date, but were drawn while on pip/tazo. CHAS was negative.  Patient has a history of breast cancer and DM.  Wound was drained on 3/29 showing purulent discharge (cultures remain negative).  ID is following.  Plan for completion of abx in house prior to transfer to [facility] in [place].     Other antibiotics: Meropenem 2 g IV q12h     Allergies: Patient has no known allergies.      List concerns for renal function: BUN:SCr > 20:1, advanced age     Pertinent cultures to date:   3/11/19 - wound (R gluteal muscle) x 2: Negative  3/12/19 - blood (peripheral) x 2: Negative  3/29/19 - wound (R buttock): x 2: NGTD     MRSA nares swab if pneumonia is a concern (ordered/positive/negative/n-a): N/A    Recent Labs      19   0013  19   0038  19   0025   WBC  3.9*  4.2*  3.9*   NEUTSPOLYS  57.80  71.00  64.10     Recent Labs      19   0013  19   0038  19   0025   BUN  28*  18  16   CREATININE  0.73  0.75  0.76     No results for input(s): VANCOTROUGH, VANCOPEAK, VANCORANDOM in the last 72 hours.  Intake/Output Summary (Last 24 hours) at 19 1420  Last data filed at 19 1300   Gross per 24 hour   Intake                0 ml   Output               20 ml   Net              -20 ml      Blood pressure 128/91, pulse 96, temperature 36.9 °C (98.4 °F), temperature source Temporal, resp. rate 20, height 1.524 m (5'), weight 62.5 kg (137 lb 12.6 oz), SpO2 98 %, not currently breastfeeding. Temp (24hrs), Av.9 °C (98.4 °F), Min:36.6 °C (97.9 °F), Max:37.3 °C (99.1 °F)      A/P   1. Vancomycin dose change: none  2. Next vancomycin level: 1700  4/1/19  3. Goal trough: 16-20mcg/mL  4. Comments: Renal function appears stable with adequate voids. Per MD notes ABX through 4/24 - once accepting facility found, patient okay to transfer with midline intact for completion of antibiotics. Continue current regimen. Pharmacy will monitor/adjust as needed per protocol.     ZULEIKA Bojorquez, Pharm.D.

## 2019-04-01 NOTE — PROGRESS NOTES
Received report and assumed patient care at change of shift. Patient is resting in bed. Pt is crying, moaning and shaking head, medications provided per MAR. New tube feed bag hung at 2130, running at 55ml/hr. Plan of care discussed, questions answered. Bed is in the lowest position and locked, call light within reach, non-skid socks in place, hourly rounding. Patient reports no further needs and this time. Sister Tabby showed up, stated she will feed her brother when she comes back from the cafeteria.

## 2019-04-01 NOTE — THERAPY
"Occupational Therapy Treatment completed with focus on ADLs and ADL transfers.  Functional Status:  Min A seated grooming, Max A LB dressing, Max A toileting, Max A stand pivot txf to w/c  Plan of Care: Will benefit from Occupational Therapy 3 times per week  Discharge Recommendations:  Equipment Will Continue to Assess for Equipment Needs. Post-acute therapy Recommend inpatient transitional care services for continued occupational therapy services.     See \"Rehab Therapy-Acute\" Patient Summary Report for complete documentation.     Pt seen for OT tx on this date. Pt continues to require max verbal and tactile cues to follow 1 step commands. Pt has decreased gross motor coordination and strong posterior lean when performing functional mobility. Pt combed hair and applied make up w/ min A for attention and follow through. Will continue to follow for Acute OT services while in house. Recommend post acute placement prior to DC home for continued inpt  Therapy.   "

## 2019-04-01 NOTE — PROGRESS NOTES
Report received. Assumed care. Pt in bed awake. A/O x3. VSS.  Denies pain, SOB. Assessment complete. Surgical dressing to the right gluteal area in place, cdi. ÁNGEL drain in place to the right lower back in place with minimal serosanguaneous output. SHOBHA midline catheter noted, dressing cdi. Pt on 2 LO via NC with sats of 95-99%.  in use.  Discussed POC, pain control, monitor labs/VS, IV antbx, safety, DC planning , pt verbalizes understanding. Explained importance of calling before getting OOB. Call light and belongings within reach. Bed alarm on. Bed in the lowest position. Treaded socks in place. Hourly rounding in progress. Will continue to monitor .

## 2019-04-02 ENCOUNTER — APPOINTMENT (OUTPATIENT)
Dept: RADIOLOGY | Facility: MEDICAL CENTER | Age: 69
DRG: 981 | End: 2019-04-02
Attending: INTERNAL MEDICINE
Payer: MEDICARE

## 2019-04-02 LAB
ANION GAP SERPL CALC-SCNC: 6 MMOL/L (ref 0–11.9)
BASOPHILS # BLD AUTO: 0.3 % (ref 0–1.8)
BASOPHILS # BLD: 0.01 K/UL (ref 0–0.12)
BUN SERPL-MCNC: 20 MG/DL (ref 8–22)
CALCIUM SERPL-MCNC: 10.2 MG/DL (ref 8.5–10.5)
CHLORIDE SERPL-SCNC: 98 MMOL/L (ref 96–112)
CO2 SERPL-SCNC: 26 MMOL/L (ref 20–33)
CREAT SERPL-MCNC: 0.76 MG/DL (ref 0.5–1.4)
EOSINOPHIL # BLD AUTO: 0.1 K/UL (ref 0–0.51)
EOSINOPHIL NFR BLD: 3.1 % (ref 0–6.9)
ERYTHROCYTE [DISTWIDTH] IN BLOOD BY AUTOMATED COUNT: 50.4 FL (ref 35.9–50)
GLUCOSE SERPL-MCNC: 93 MG/DL (ref 65–99)
HCT VFR BLD AUTO: 37.3 % (ref 37–47)
HGB BLD-MCNC: 11.6 G/DL (ref 12–16)
IMM GRANULOCYTES # BLD AUTO: 0.01 K/UL (ref 0–0.11)
IMM GRANULOCYTES NFR BLD AUTO: 0.3 % (ref 0–0.9)
LYMPHOCYTES # BLD AUTO: 0.47 K/UL (ref 1–4.8)
LYMPHOCYTES NFR BLD: 14.5 % (ref 22–41)
MCH RBC QN AUTO: 25 PG (ref 27–33)
MCHC RBC AUTO-ENTMCNC: 31.1 G/DL (ref 33.6–35)
MCV RBC AUTO: 80.4 FL (ref 81.4–97.8)
MONOCYTES # BLD AUTO: 0.42 K/UL (ref 0–0.85)
MONOCYTES NFR BLD AUTO: 12.9 % (ref 0–13.4)
NEUTROPHILS # BLD AUTO: 2.24 K/UL (ref 2–7.15)
NEUTROPHILS NFR BLD: 68.9 % (ref 44–72)
NRBC # BLD AUTO: 0 K/UL
NRBC BLD-RTO: 0 /100 WBC
PLATELET # BLD AUTO: 98 K/UL (ref 164–446)
PMV BLD AUTO: 10.7 FL (ref 9–12.9)
POTASSIUM SERPL-SCNC: 4.3 MMOL/L (ref 3.6–5.5)
RBC # BLD AUTO: 4.64 M/UL (ref 4.2–5.4)
SODIUM SERPL-SCNC: 130 MMOL/L (ref 135–145)
WBC # BLD AUTO: 3.3 K/UL (ref 4.8–10.8)

## 2019-04-02 PROCEDURE — 36415 COLL VENOUS BLD VENIPUNCTURE: CPT

## 2019-04-02 PROCEDURE — 700102 HCHG RX REV CODE 250 W/ 637 OVERRIDE(OP): Performed by: INTERNAL MEDICINE

## 2019-04-02 PROCEDURE — 700111 HCHG RX REV CODE 636 W/ 250 OVERRIDE (IP): Performed by: INTERNAL MEDICINE

## 2019-04-02 PROCEDURE — 302135 SEQUENTIAL COMPRESSION MACHINE: Performed by: INTERNAL MEDICINE

## 2019-04-02 PROCEDURE — 85025 COMPLETE CBC W/AUTO DIFF WBC: CPT

## 2019-04-02 PROCEDURE — 770004 HCHG ROOM/CARE - ONCOLOGY PRIVATE *

## 2019-04-02 PROCEDURE — 700117 HCHG RX CONTRAST REV CODE 255: Performed by: INTERNAL MEDICINE

## 2019-04-02 PROCEDURE — A9270 NON-COVERED ITEM OR SERVICE: HCPCS | Performed by: INTERNAL MEDICINE

## 2019-04-02 PROCEDURE — A9270 NON-COVERED ITEM OR SERVICE: HCPCS | Performed by: HOSPITALIST

## 2019-04-02 PROCEDURE — 700105 HCHG RX REV CODE 258: Performed by: INTERNAL MEDICINE

## 2019-04-02 PROCEDURE — 700102 HCHG RX REV CODE 250 W/ 637 OVERRIDE(OP): Performed by: HOSPITALIST

## 2019-04-02 PROCEDURE — 74177 CT ABD & PELVIS W/CONTRAST: CPT

## 2019-04-02 PROCEDURE — 99232 SBSQ HOSP IP/OBS MODERATE 35: CPT | Performed by: INTERNAL MEDICINE

## 2019-04-02 PROCEDURE — 97530 THERAPEUTIC ACTIVITIES: CPT

## 2019-04-02 PROCEDURE — 97116 GAIT TRAINING THERAPY: CPT

## 2019-04-02 PROCEDURE — 80048 BASIC METABOLIC PNL TOTAL CA: CPT

## 2019-04-02 PROCEDURE — 99233 SBSQ HOSP IP/OBS HIGH 50: CPT | Performed by: INTERNAL MEDICINE

## 2019-04-02 RX ORDER — FUROSEMIDE 40 MG/1
40 TABLET ORAL
Status: DISCONTINUED | OUTPATIENT
Start: 2019-04-03 | End: 2019-04-07

## 2019-04-02 RX ORDER — POTASSIUM CHLORIDE 20 MEQ/1
40 TABLET, EXTENDED RELEASE ORAL 2 TIMES DAILY
Status: DISCONTINUED | OUTPATIENT
Start: 2019-04-02 | End: 2019-04-12

## 2019-04-02 RX ADMIN — GABAPENTIN 300 MG: 300 CAPSULE ORAL at 05:52

## 2019-04-02 RX ADMIN — IBUPROFEN 600 MG: 400 TABLET, FILM COATED ORAL at 10:12

## 2019-04-02 RX ADMIN — NYSTATIN: 100000 POWDER TOPICAL at 16:42

## 2019-04-02 RX ADMIN — MEROPENEM 2 G: 1 INJECTION, POWDER, FOR SOLUTION INTRAVENOUS at 07:57

## 2019-04-02 RX ADMIN — POTASSIUM CHLORIDE 40 MEQ: 1500 TABLET, EXTENDED RELEASE ORAL at 05:53

## 2019-04-02 RX ADMIN — GABAPENTIN 300 MG: 300 CAPSULE ORAL at 16:41

## 2019-04-02 RX ADMIN — FUROSEMIDE 40 MG: 40 TABLET ORAL at 05:51

## 2019-04-02 RX ADMIN — OXYCODONE HYDROCHLORIDE 2.5 MG: 5 TABLET ORAL at 06:01

## 2019-04-02 RX ADMIN — OMEPRAZOLE 20 MG: 20 CAPSULE, DELAYED RELEASE ORAL at 05:52

## 2019-04-02 RX ADMIN — MEROPENEM 2 G: 1 INJECTION, POWDER, FOR SOLUTION INTRAVENOUS at 20:23

## 2019-04-02 RX ADMIN — ACETAMINOPHEN 650 MG: 325 TABLET, FILM COATED ORAL at 16:41

## 2019-04-02 RX ADMIN — VANCOMYCIN HYDROCHLORIDE 700 MG: 100 INJECTION, POWDER, LYOPHILIZED, FOR SOLUTION INTRAVENOUS at 16:41

## 2019-04-02 RX ADMIN — POTASSIUM CHLORIDE 40 MEQ: 1500 TABLET, EXTENDED RELEASE ORAL at 16:41

## 2019-04-02 RX ADMIN — OXYCODONE HYDROCHLORIDE 2.5 MG: 5 TABLET ORAL at 23:21

## 2019-04-02 RX ADMIN — IBUPROFEN 600 MG: 400 TABLET, FILM COATED ORAL at 22:22

## 2019-04-02 RX ADMIN — NYSTATIN: 100000 POWDER TOPICAL at 05:52

## 2019-04-02 RX ADMIN — IOHEXOL 100 ML: 350 INJECTION, SOLUTION INTRAVENOUS at 19:21

## 2019-04-02 RX ADMIN — LOSARTAN POTASSIUM 50 MG: 50 TABLET ORAL at 05:52

## 2019-04-02 ASSESSMENT — ENCOUNTER SYMPTOMS
SHORTNESS OF BREATH: 0
HEADACHES: 0
COUGH: 0
MYALGIAS: 1
ABDOMINAL PAIN: 0
HEARTBURN: 0
CHILLS: 0
CONSTIPATION: 0
WHEEZING: 0
BACK PAIN: 0
STRIDOR: 0
PALPITATIONS: 0
DOUBLE VISION: 0
NAUSEA: 0
DEPRESSION: 0
BACK PAIN: 1
BLURRED VISION: 0
DIZZINESS: 0
VOMITING: 0
FEVER: 0
DIAPHORESIS: 0
NERVOUS/ANXIOUS: 0
SORE THROAT: 0
WEAKNESS: 1
FOCAL WEAKNESS: 0
DIARRHEA: 0

## 2019-04-02 ASSESSMENT — COGNITIVE AND FUNCTIONAL STATUS - GENERAL
SUGGESTED CMS G CODE MODIFIER MOBILITY: CL
CLIMB 3 TO 5 STEPS WITH RAILING: A LOT
MOVING FROM LYING ON BACK TO SITTING ON SIDE OF FLAT BED: UNABLE
MOBILITY SCORE: 11
WALKING IN HOSPITAL ROOM: A LITTLE
MOVING TO AND FROM BED TO CHAIR: UNABLE
TURNING FROM BACK TO SIDE WHILE IN FLAT BAD: UNABLE
STANDING UP FROM CHAIR USING ARMS: A LITTLE

## 2019-04-02 ASSESSMENT — GAIT ASSESSMENTS
ASSISTIVE DEVICE: FRONT WHEEL WALKER
GAIT LEVEL OF ASSIST: MINIMAL ASSIST
DISTANCE (FEET): 20
DEVIATION: STEP TO;DECREASED BASE OF SUPPORT;BRADYKINETIC;SHUFFLED GAIT;DECREASED HEEL STRIKE;DECREASED TOE OFF

## 2019-04-02 NOTE — PROGRESS NOTES
Pharmacy Kinetics 68 y.o. female on vancomycin day # 9 2019    Currently on Vancomycin 700 mg iv q24hr     Indication for Treatment:  empiric for possible CNS infection     Pertinent history per medical record: Admitted on 3/8/2019 for iliopsoas mass and multiple brain lesions.  There is concern that this is infectious.  All cultures are negative to date, but were drawn while on pip/tazo. CHAS was negative.  Patient has a history of breast cancer and DM.  Wound was drained on 3/29 showing purulent discharge (cultures remain negative).  ID is following.  Plan for completion of abx in house prior to transfer to [facility] in [place].     Other antibiotics: Meropenem 2 g IV q12h     Allergies: Patient has no known allergies.      List concerns for renal function: BUN:SCr > 20:1, advanced age     Pertinent cultures to date:   3/11/19 - wound (R gluteal muscle) x 2: Negative  3/12/19 - blood (peripheral) x 2: Negative  3/29/19 - wound (R buttock): x 2: NGTD     MRSA nares swab if pneumonia is a concern (ordered/positive/negative/n-a): N/A    Recent Labs      19   0038  19   0025  19   0014   WBC  4.2*  3.9*  3.3*   NEUTSPOLYS  71.00  64.10  68.90     Recent Labs      19   0038  19   0025  19   0014   BUN  18  16  20   CREATININE  0.75  0.76  0.76     Recent Labs      19   1731   VANCOTROUGH  18.3     Intake/Output Summary (Last 24 hours) at 19 0814  Last data filed at 19 0551   Gross per 24 hour   Intake             1100 ml   Output              225 ml   Net              875 ml      Blood pressure 143/84, pulse (!) 106, temperature 36.7 °C (98 °F), temperature source Oral, resp. rate (!) 21, height 1.524 m (5'), weight 62.5 kg (137 lb 12.6 oz), SpO2 97 %, not currently breastfeeding. Temp (24hrs), Av.3 °C (99.1 °F), Min:36.7 °C (98 °F), Max:38.5 °C (101.3 °F)      A/P   1. Vancomycin dose change: none  2. Next vancomycin level: 3-4 days or sooner if  indicated  3. Goal trough: 16-20mcg/mL  4. Comments: Renal function appears stable with adequate voids. Per MD notes ABX through 4/24 - once accepting facility found, patient okay to transfer with midline intact for completion of antibiotics. Vancomycin trough 4/1/19 within goal therapeutic range. Continue current regimen. Pharmacy will monitor/adjust as needed per protocol.     ZULEIKA Bojorquez Pharm.D.

## 2019-04-02 NOTE — DISCHARGE PLANNING
Lengthy conversation with PTs brother regarding SNF placement. He provided three new choices in the [place] area. We discussed the possibility that due to her needing additional weeks of antibiotocs the SNFs may not accept her. We discussed the possibility of an LTAC accepting her, he will look into LTACs in his area. This RN CM explained further that his sister may need to remain here in the hospital until the antibiotics are completed currently April 24th. He stated he will back here in [place] on Sunday weather permitting. Will send referrals to new SNFs provided.

## 2019-04-02 NOTE — CARE PLAN
Problem: Urinary Elimination:  Goal: Ability to reestablish a normal urinary elimination pattern will improve  Outcome: PROGRESSING AS EXPECTED  Patient educated to call when needing to use the bathroom or when she is incontinent. Assess [id number] for incontinence and giovanni care.

## 2019-04-02 NOTE — CARE PLAN
Problem: Safety  Goal: Will remain free from falls  Outcome: PROGRESSING AS EXPECTED  Safety education provided, room near nurses station, bed alarm on, call light within reach    Problem: Pain Management  Goal: Pain level will decrease to patient's comfort goal  Outcome: PROGRESSING AS EXPECTED  Patient c/o back pain. PRN oxycodone and IV morphine provided

## 2019-04-02 NOTE — PROGRESS NOTES
Infectious Disease Progress Note    Author: Beatriz Johnson M.D. Date & Time of service: 4/2/2019  3:28 PM    Chief Complaint:  Multiple brain lesions  Iliopsoas lesion    Interval History:  68-year-old female with history of diabetes and breast cancer, admitted 3/8/2019 with abdominal pain, iliopsoas lesion, and an abnormal MRI with multiple brain lesions.  3/14 AF (99.2) WBC 5.8 more awake today as compared to yesterday but not oriented-denies pain, following commands. ECHO reviewed  3/15 AF (99.1) no WBC today Had CHAS today  3/16 temp 100.5 WBC 7.9 more confused today-naked but pushing off sheets  3/17 AF WBC not done Less obtunded today-not oriented. Denies pain, N/V, HA, visual changes etc  3/18 afebrile WBC 6 patient sitting up in bed eating breakfast, she is quite soft spoken.  She denies any headache, nausea or vomiting.  Endorses a mild cough.  3/19 AF pt denies any new symptoms, no HA. Brain lesions too small for biopsy per notes. Neurosurgery recommends repeating scan in 2 weeks.  3/20 afebrile patient is sleeping and difficult to arouse  3/21 patient is sitting up in bed and is alert and awake.  She however appears to have some waxing confusion but no she is in the hospital in [place].  Complains of lower back pain but no headache, nausea or vomiting  3/22 AF no CBC, continues to have bowel incontinence but not watery per RN, pt has no new complaints today, not engaged in conversation  3/23 afebrile WBC 6.3 patient complaining of stool incontinence and diarrhea, midline placed yesterday  3/24 afebrile patient rested comfortably without any new complaints  3/25 T- max 98.5, no new labs since 03/23. Reports mild HA and weakness. Emotional when asked about hx of hip fx.  3/26 afebrile, wbc 8.0. Reports feeling agitated. Per RN appeared confused throughout the night  3/27 no change overnight. RN reports agitation overnight. Pt sleeping, difficult to wake this am  3/28 reports slight improvement from yesterday.  Moving LE easier after abscess drained by primary team.   3/29/2019 no fevers.  WBC is down to 3.4.  Underwent drainage from the right gluteal area.  It was purulent.  3/30/2019-no fevers.  Remains slightly confused.  WBC is 3.9  3/31/2019 had fevers up to 102.6.  Complains of some right hand weakness.  No new issues overnight.  Family at bedside.  2019 no fevers.  Easily arousable.  ÁNGEL continues to drain.  2019-no fevers.  Awake and responsive but feels weak.  There is minimal drainage from the ÁNGEL.  As per the nursing the patient is incontinent of stool.  But when you talk to her she states she is just is not able to get up on time.  Labs Reviewed    Review of Systems:  Review of Systems   Constitutional: Negative for chills and fever.   HENT: Negative for sore throat.    Respiratory: Negative for cough, shortness of breath, wheezing and stridor.    Cardiovascular: Negative for chest pain and palpitations.   Gastrointestinal: Negative for abdominal pain, diarrhea, nausea and vomiting.   Genitourinary: Negative for dysuria.   Musculoskeletal: Positive for back pain.        Improving   Skin: Negative for itching and rash.   Neurological: Positive for weakness. Negative for dizziness and headaches.        Says she is having difficulty finding words and complains of some right-sided hand weakness   Limited review of systems secondary to intermittent confusion    Hemodynamics:  Temp (24hrs), Av.3 °C (99.2 °F), Min:36.7 °C (98 °F), Max:38.5 °C (101.3 °F)  Temperature: 37.2 °C (99 °F)  Pulse  Av.5  Min: 70  Max: 153  Blood Pressure : 108/58       Physical Exam:  Physical Exam   Constitutional: She is oriented to person, place, and time. No distress.   HENT:   Head: Normocephalic and atraumatic.   Mouth/Throat: Oropharynx is clear and moist. No oropharyngeal exudate.   Eyes: Pupils are equal, round, and reactive to light. EOM are normal. Right eye exhibits no discharge. Left eye exhibits no discharge.    Neck: Neck supple. No JVD present.   Cardiovascular: Normal rate and intact distal pulses.    Murmur heard.  Pulmonary/Chest: Effort normal. No respiratory distress. She has no wheezes.   Abdominal: Soft. Bowel sounds are normal. She exhibits no distension. There is no tenderness.   Musculoskeletal: She exhibits no tenderness or deformity.   Right-sided hip drain with no significant drainage   Lymphadenopathy:     She has no cervical adenopathy.   Neurological: She is alert and oriented to person, place, and time.   Moving all extremities  Answering appropriately   Skin: Skin is warm. No rash noted. She is not diaphoretic.   Well-healed left breast mastectomy scar   Nursing note and vitals reviewed.      Meds:    Current Facility-Administered Medications:   •  [START ON 4/3/2019] furosemide  •  potassium chloride SA  •  loperamide  •  vancomycin  •  meropenem  •  MD Alert...Vancomycin per Pharmacy  •  nystatin  •  labetalol  •  ibuprofen  •  haloperidol lactate  •  pramipexole  •  tramadol  •  losartan  •  gabapentin  •  omeprazole  •  Respiratory Care per Protocol  •  acetaminophen  •  Notify provider if pain remains uncontrolled **AND** Use the numeric rating scale (NRS-11) on regular floors and Critical-Care Pain Observation Tool (CPOT) on ICUs/Trauma to assess pain **AND** Pulse Ox (Oximetry) **AND** Pharmacy Consult Request **AND** If patient difficult to arouse and/or has respiratory depression, stop any opiates that are currently infusing and call a Rapid Response. **AND** oxyCODONE immediate-release **AND** [DISCONTINUED] oxyCODONE immediate-release **AND** morphine injection  •  ondansetron  •  ondansetron    Labs:  Recent Labs      03/31/19   0038  04/01/19   0025  04/02/19   0014   WBC  4.2*  3.9*  3.3*   RBC  4.50  4.78  4.64   HEMOGLOBIN  11.3*  11.8*  11.6*   HEMATOCRIT  36.2*  38.3  37.3   MCV  80.4*  80.1*  80.4*   MCH  25.1*  24.7*  25.0*   RDW  50.4*  49.8  50.4*   PLATELETCT  90*  123*  98*    MPV  11.0  12.1  10.7   NEUTSPOLYS  71.00  64.10  68.90   LYMPHOCYTES  14.50*  17.80*  14.50*   MONOCYTES  13.60*  16.20*  12.90   EOSINOPHILS  0.70  1.30  3.10   BASOPHILS  0.00  0.30  0.30     Recent Labs      03/31/19   0038  04/01/19   0025  04/02/19   0014   SODIUM  133*  131*  130*   POTASSIUM  3.8  3.5*  4.3   CHLORIDE  100  97  98   CO2  28  28  26   GLUCOSE  99  103*  93   BUN  18  16  20     Recent Labs      03/31/19   0038  04/01/19   0025  04/02/19   0014   CREATININE  0.75  0.76  0.76       Imaging:  Ct-needle Bx-muscle Right    Result Date: 3/13/2019  3/11/2019 4:44 PM HISTORY/REASON FOR EXAM:  Right gluteal hypodense lesion. Moderate sedation was administered with continuous monitoring of the patient under the direct supervision of  Medications: 2 mg of Versed and 200 mcg of fentanyl Total sedation time 60 minutes Procedure: After the informed consent the patient was placed on the CT table on prone position. Localization CT scan was obtained. It demonstrates hypodense area in the right gluteus muscle. Subsequently a 17-gauge needle was advanced into the lesion. 20  mL of pus was aspirated. The materials were sent to the pathology. The patient tolerated the procedure without any immediate competition.     CT-guided aspiration of pus like material in the right gluteal muscle lesion.    Ec-echocardiogram Complete W/o Cont    Result Date: 3/13/2019  Transthoracic Echo Report Echocardiography Laboratory CONCLUSIONS No prior study is available for comparison. Normal left ventricular systolic function. Left ventricular ejection fraction is visually estimated to be 65%. Normal diastolic function. Normal inferior vena cava size and inspiratory collapse. Aortic sclerosis without stenosis. Estimated right ventricular systolic pressure  is 33 mmHg. No obvious valvular vegetations are noted on a decent quality study.  If further valvular assessment is clinically indicated, consider transesophageal  "echocardiogram. SAM,  LV EF:  65    % FINDINGS Left Ventricle Normal left ventricular size and systolic function. Normal left ventricular wall thickness. Left ventricular ejection fraction is visually estimated to be 65%. Normal diastolic function. Right Ventricle Normal right ventricular size and systolic function. Right Atrium Normal right atrial size. Normal inferior vena cava size and inspiratory collapse. Left Atrium Normal left atrial size. Mitral Valve Structurally normal mitral valve without significant stenosis or regurgitation. Aortic Valve Aortic sclerosis without stenosis. No aortic insufficiency. Tricuspid Valve Structurally normal tricuspid valve. Mild tricuspid regurgitation. Right atrial pressure is estimated to be 3 mmHg. Estimated right ventricular systolic pressure  is 33 mmHg. Pulmonic Valve The pulmonic valve is not well visualized. No pulmonic insufficiency. Pericardium Normal pericardium without effusion. Aorta The aortic root is normal.  Ascending aorta diameter is 3.0 cm. Claire Tripathi MD (Electronically Signed) Final Date:     13 March 2019                 14:38      Micro:  Results     Procedure Component Value Units Date/Time    BLOOD CULTURE [[id number]] Collected:  03/31/19 1504    Order Status:  Completed Specimen:  Blood from Peripheral Updated:  04/01/19 0854     Significant Indicator NEG     Source BLD     Site PERIPHERAL     Blood Culture No Growth    Note: Blood cultures are incubated for 5 days and  are monitored continuously.Positive blood cultures  are called to the RN and reported as soon as  they are identified.      Narrative:       Per Hospital Policy: Only change Specimen Src: to \"Line\" if  specified by physician order.    BLOOD CULTURE [[id number]] Collected:  03/31/19 1504    Order Status:  Completed Specimen:  Blood from Peripheral Updated:  04/01/19 0854     Significant Indicator NEG     Source BLD     Site PERIPHERAL     Blood Culture No Growth    Note: Blood " "cultures are incubated for 5 days and  are monitored continuously.Positive blood cultures  are called to the RN and reported as soon as  they are identified.      Narrative:       Per Hospital Policy: Only change Specimen Src: to \"Line\" if  specified by physician order.    CULTURE WOUND W/ GRAM STAIN [[id number]] Collected:  03/29/19 1030    Order Status:  Completed Specimen:  Wound Updated:  04/01/19 0841     Significant Indicator NEG     Source WND     Site Right Buttock     Culture Result Wound No growth at 72 hours     Gram Stain Result Moderate WBCs.  No organisms seen.      Narrative:       Collected By:[id number] PICKENS  RIGHT Gluteal fluid aspirate x1 ml, collected by Dr. Persaud,  ordered by Dr. Keller, for gram stain and C&S.    GRAM STAIN [[id number]] Collected:  03/29/19 1030    Order Status:  Completed Specimen:  Wound Updated:  03/29/19 1401     Significant Indicator .     Source WND     Site Right Buttock     Gram Stain Result Moderate WBCs.  No organisms seen.      Narrative:       Collected By:[id number] JUDITH PICKENS  RIGHT Gluteal fluid aspirate x1 ml, collected by Dr. Persaud,  ordered by Dr. Keller, for gram stain and C&S.    FLUID CULTURE W/GRAM STAIN [[id number]] Collected:  03/29/19 1030    Order Status:  Canceled Specimen:  Other from Other Body Fluid           Assessment:  Active Hospital Problems    Diagnosis   • *Lesion of brain [G93.9]   • Mass of iliopsoas muscle group [M62.89]   • Encephalopathy [G93.40]   • Hyponatremia [E87.1]   • History of breast cancer [Z85.3]   • Diabetes mellitus type 2 in obese (HCC) [E11.69, E66.9]       Plan:  Multiple brain lesions  Low-grade fevers resolved  Wbc 4.5  Confusion resolving  MRI with scattered small lesions incl aaron, too small to biopsy per neurosurgery  TTE neg; CHAS neg  Bcxs neg to date  Repeat CT scan on 3/30/2019 shows decrease in the size of the abscesses  Repeat MRI prior to completion of abx  Discussed with the patient regarding calling the staff " when she has to go to the bathroom.  Commode will be placed at her bedside.    Iliopsoas mass, on treatment  CT + 2.6 cm lesion in the right iliopsoas region  S/p aspiration +WBCs-cultures negative to date  Bcxs remain neg  D/c'd Vanco secondary to elevated Vanco troughs 03/23 and patient not clearing well, restarted 03/25  D/c cefepime 03/26 - may be contributing to confusion  Tolerating Meropenem started 03/26,  Midline pending  MRI w/ contrast lumbar, pelvis 03/27 - Iliopsoas and gluteal fluid collections noted.    Drain placed 3/29/2019  Cultures are negative so far  Aim for at least 4-6 weeks of antibiotics  Repeat CT scan.  If the abscess is resolved discontinue the drain    New fevers  Resolving  Blood cultures x2 were negative from 3/31/2019    H/o breast cancer  Markedly elevated alk phos  Recommend continued diaz for mets    Type 2 diabetes mellitus  Hemoglobin A1c 6.3  Keep BS under 150 to help control current infection    I have performed a physical exam and reviewed and updated ROS and plan today 4/2/2019.  In review of yesterday's note 4/1/2019, there are no changes except as documented above.      Discussed with Dr. Keller

## 2019-04-02 NOTE — CARE PLAN
Problem: Pain Management  Goal: Pain level will decrease to patient's comfort goal  Outcome: PROGRESSING AS EXPECTED   Patient educated on goal of pain management and medications available to her.

## 2019-04-02 NOTE — THERAPY
"Pt demonstrating improved balance and gait mechanics. Pt required verbal cuing and sequencing for ambulation w/walker. Pt w/improved wt shifting and required less physical assist than prev txs. Pt continues to rfequire assist to get back in bed for LE management, but suspect 2' short stature vs bed height. Pt would benefit from further acute PT txs to progress towards goals and independence. Recommend post acute placement at an inpatient transitional care facility to address deficits.    Physical Therapy Treatment completed.   Bed Mobility:  Supine to Sit:  (NT--found up in chair)  Transfers: Sit to Stand: Minimal Assist  Gait: Level Of Assist: Minimal Assist with Front-Wheel Walker       Plan of Care: Will benefit from Physical Therapy 3 times per week    See \"Rehab Therapy-Acute\" Patient Summary Report for complete documentation.       "

## 2019-04-02 NOTE — PROGRESS NOTES
"Patient A&O x3, disoriented to time. Patient tearful, crying and and c/o pain in her back and \"butt\" this evening. Oxycodone and IV morphine provided per MAR with eventual relief. Midline catheter leaking at insertion site; dressing still clean and intact. Flushing well without blood return infusing IVF and IV abx. x2 assist for q2 turns. Patient incontinent of urine and stool. ÁNGEL drain in place to lower right back. Safety precautions in place, bed alarm in use.  "

## 2019-04-02 NOTE — PROGRESS NOTES
"[facility] Medicine Daily Progress Note    Date of Service  4/2/2019    Chief Complaint  68 y.o. female admitted 3/8/2019 with right hip pain, fever and abnormal brain mri.    Hospital Course    Patient started on empiric antibiotics given fever.  She had abnormal findings on MRI brain and CT showed \"lesion\" on right iliopsoas.  This lesion was biopsied and turned out to be abscess.  She has history of breast cancer and there is also concern of brain lesions being metastatic though their appearance is not typical of this.      Interval Problem Update  3/12 Discussed with Dr Turner for second opinion of brain lesions and based on appearance not able to r/o or r/i any diagnosis.  Given patient's presentation of confusion, hip pain and fever  - this is more supportive of infectious etiology rather than malignant.  Fluid from right gluteal area sent to micro and as of yet - no growth.  Continue zosyn for now.  3/13 Patient with slight improvement in mentation.  Blood cultures and abscess cultures are showing no growth at this time.  TTE being done while I examined patient - no evidence of vegetations on this test.  ID consultation pending - d/w Dr Garcia.  3/14 Patient feeling well today, her pain is present but not as bad as prior to admission.  She was able to follow the conversation today and is agreeable to move forward with the CHAS tomorrow.  I spoke with her brother, René, and updated him on condition yesterday afternoon also.  Will also have PICC placed in next few days as long as blood cultures remain negative as I expect a prolonged antibiotic course of treatment.  3/15 Patient without new complaints, states she needs help to move in bed.  CHAS showed no evidence of vegetations and regular diet resumed.  Culture remains negative and biopsy of brain lesion may prove needed - will watch through the weekend and if mentation does not continue to improve, will discuss with neurosurgery on Monday.  3/16 Patient states she " is okay today, mentation has waxed and waned without significant improvement.  She was febrile earlier today.  No other acute events.  3/17 Patient with significant improvement in mentation today, she is aware of her hospitalization, not falling asleep mid sentence.  Her pain mediations were held most of yesterday and likely far too strong for patient and were affecting her awareness.  Oxycodone 5 discontinued and will use tramadol instead unless pain not well controlled.  CT brain with contrast ordered as a quick scan for comparison to MRI.    3/18 Patient feeling same today, discussed need to discuss with neurosurgery for possible biopsy.  Discussed with Dr Nguyen, he reviewed MRI and CT brain and he feels they are likely metastatic lesions but are far too small to biopsy.  His recommendation is to continue with antibiotics and re-image in 2 weeks to see if any change that would be amenable to biopsy or that would further declare infection.    3/26 Patient mental status continues to wax and wane.  ID ordered MRI lumbar, pelvis and sacrum for evaluation for source of infection, patient with continued low back pain and no real improvement since I saw her 7 days prior with continued antibiotics during this time.  Cultures have not growth pathogen, repeat MRI 3/28.   3/27 Patient somnolent most of the day today, MRI's completed and showing 2 areas of fluid collections, given her history are likely abscesses and will require drainage, CT guided drainage requested and patient NPO at [facility] for this.  D/w ID - cultures will be done on fluid collection.  Repeat CT brain ordered for tomorrow.  3/28 Patient up to chair, diet resumed as lovenox given this am and drainage of abscesses deferred until tomorrow.  Vanco/merrem to continue and cultures will be collected from abscesses.  3/29 Patient went to IR today, had drain placed in the right buttock into abscess cavity and fluid sent for culture.  Potassium is slightly low,  continue with PO replacement.  HR has improved from yesterday but patient PO intake still not at normal level, increase IVF rate to 100cc/hour.  3/30 Patient without fever since drain placed right buttock.  Purulent material in ÁNGEL bulb. Cultures thus far are still showing no growth.  Antibiotics to continue.  CT head ordered to evaluate change in past 2 weeks as recommended by neurosurgery.  3/31 Patient with fevers overnight - was altered during this time but has recovered.  She denies pain when examined by me.  She still has purulent material in the drainage tubing.  Will continue with current IV antibiotics - 6 weeks total antibiotics suspected - end date would be 4/24/19.  D/w ID.  4/1 Patient remains intermittently somnolent.  She wakes easily but falls back to sleep quickly.  She remained afebrile overnight.  ÁNGEL drain continues to drain purulent material.  ABX through 4/24 - once accepting facility found, patient okay to transfer with midline intact for completion of antibiotics.  4/2 patient up in chair, per RN, required minimal assist for transfer  Improving strength  Minimal output from ÁNGEL drain    Consultants/Specialty  ID - santino/Alex    Code Status  full    Disposition  SNF in [place] to complete abx.  We will repeat CT of the abdomen pelvis, evaluate for gluteal abscess    Review of Systems  Review of Systems   Constitutional: Negative for chills, diaphoresis, fever and malaise/fatigue.   HENT: Negative for congestion.    Eyes: Negative for blurred vision and double vision.   Respiratory: Negative for cough and shortness of breath.    Cardiovascular: Negative for chest pain and leg swelling.   Gastrointestinal: Negative for abdominal pain, constipation, diarrhea, heartburn, nausea and vomiting.   Genitourinary: Negative for dysuria, hematuria and urgency.   Musculoskeletal: Positive for myalgias. Negative for back pain.   Skin: Negative for itching and rash.   Neurological: Positive for weakness.  Negative for dizziness, focal weakness and headaches.   Psychiatric/Behavioral: Negative for depression. The patient is not nervous/anxious.         Physical Exam  Temp:  [36.7 °C (98 °F)-38.5 °C (101.3 °F)] 37.2 °C (99 °F)  Pulse:  [] 115  Resp:  [18-21] 18  BP: (108-150)/(58-84) 108/58  SpO2:  [90 %-99 %] 95 %    Physical Exam   Constitutional: She is oriented to person, place, and time. She appears well-developed and well-nourished.   HENT:   Head: Normocephalic and atraumatic.   Mouth/Throat: No oropharyngeal exudate.   Eyes: Conjunctivae are normal.   Neck: Neck supple. No JVD present.   Cardiovascular: Normal rate, regular rhythm and intact distal pulses.  Exam reveals no friction rub.    No murmur heard.  Pulmonary/Chest: Effort normal and breath sounds normal. No respiratory distress.   Abdominal: Soft. Bowel sounds are normal. She exhibits no distension.   Musculoskeletal: She exhibits no edema or tenderness.   Neurological: She is alert and oriented to person, place, and time. No cranial nerve deficit. Coordination normal.   Skin: Skin is warm and dry. No erythema.   Psychiatric: She has a normal mood and affect. Her behavior is normal.   Nursing note and vitals reviewed.      Fluids    Intake/Output Summary (Last 24 hours) at 04/02/19 1647  Last data filed at 04/02/19 0551   Gross per 24 hour   Intake             1100 ml   Output              205 ml   Net              895 ml       Laboratory  Recent Labs      03/31/19   0038  04/01/19   0025  04/02/19   0014   WBC  4.2*  3.9*  3.3*   RBC  4.50  4.78  4.64   HEMOGLOBIN  11.3*  11.8*  11.6*   HEMATOCRIT  36.2*  38.3  37.3   MCV  80.4*  80.1*  80.4*   MCH  25.1*  24.7*  25.0*   MCHC  31.2*  30.8*  31.1*   RDW  50.4*  49.8  50.4*   PLATELETCT  90*  123*  98*   MPV  11.0  12.1  10.7     Recent Labs      03/31/19   0038  04/01/19   0025  04/02/19   0014   SODIUM  133*  131*  130*   POTASSIUM  3.8  3.5*  4.3   CHLORIDE  100  97  98   CO2  28  28  26    GLUCOSE  99  103*  93   BUN  18  16  20   CREATININE  0.75  0.76  0.76   CALCIUM  10.3  10.3  10.2                   Imaging  CT-HEAD WITH & W/O   Final Result      1.  Interval decrease in size and level of enhancement of multiple small punctate foci in both cerebral hemispheres and in the midbrain consistent with improving septic emboli.      2.  No hemorrhage or mass effect.      CT-DRAIN-RETROPERITONEAL   Final Result      1.  CT GUIDED CATHETER DRAINAGE OF RIGHT GLUTEAL ABSCESS.   2.  THE CURRENT PLAN IS TO OBTAIN A FOLLOW-UP CT SCAN IN 5-7 DAYS IF INDICATED. ORDERS WERE WRITTEN FOR ONCE DAILY IRRIGATION OF THE CATHETER WITH 5 ML STERILE SALINE FOR 3 DAYS.   3. THE ILIOPSOAS COLLECTION WAS NOT AMENABLE TO CATHETER DRAINAGE AS INTERVENING BOWEL AND/OR BONY STRUCTURES OBSTRUCT A PATHWAY TO THIS COLLECTION AT THIS TIME.      MR-LUMBAR SPINE-WITH & W/O   Final Result      1.  There are multifocal enhancing fluid collections in the right iliopsoas muscles and gluteus muscles adjacent to the right ilium. Right iliopsoas fluid collection measures an approximately 3.9 x 2.9 cm. The right gluteal fluid collection measures an    approximately 3.5 x 2.8 cm in size. The differential diagnosis includes postsurgical seroma and abscess.   2.  Post operative and degenerative changes as described above.      MR-PELVIS-WITH & W/O AND SEQUENCES   Final Result      1.  Surgical screw traverses both sacroiliac joint across presumed pathologic fracture of the right sacrum and the hardware obscures the adjacent tissues.      2.  No other evidence for bony metastatic disease      3.  Right gluteal musculature rim-enhancing fluid collection measuring 3.4 x 2.8 cm. This could be a postoperative seroma versus abscess      4.  osteoarthritis of both hip joint      5.  Prior hysterectomy      IR-MIDLINE CATHETER INSERTION WO GUIDANCE > AGE 3   Final Result                  Ultrasound-guided midline placement performed by qualified  nursing staff    as above.          CT-HEAD WITH   Final Result      Multiple subcentimeter too numerous to count enhancing masses scattered throughout the supratentorial and infratentorial brain. These demonstrate some surrounding vasogenic edema and most likely represent multifocal metastatic lesions. Other    considerations would include multifocal abscesses or septic emboli.      EC-CHAS W/O CONT   Final Result      EC-CHAS W/O CONT   Final Result      CT-NEEDLE BX-MUSCLE RIGHT   Final Result      CT-guided aspiration of pus like material in the right gluteal muscle lesion.      EC-ECHOCARDIOGRAM COMPLETE W/O CONT   Final Result      OUTSIDE IMAGES-CT CHEST/ABDOMEN/PELVIS   Final Result      OUTSIDE IMAGES-DX CHEST   Final Result      [id number] FILM X-RAY   Final Result      OUTSIDE IMAGES-MR BRAIN   Final Result      OUTSIDE IMAGES-MR BRAIN   Final Result      CT-PELVIS WITH    (Results Pending)   CT-ABDOMEN-PELVIS WITH    (Results Pending)        Assessment/Plan  * Lesion of brain   Assessment & Plan    Metastatic cancer vs infectious etiology  D/w Dr Black for questionable brain mets, recommending IR Bx of psoas muscle   IR Bx of psoas muscle ordered - was abscess  Empiric treatment of infection  TTE and CHAS negative for vegetations.  D/w Dr Nguyen - lesions likely metastatic based on his interpretation of MRI/CT - too small to biopsy, recommends continuing antibiotics and re-imaging in 2 weeks to see if there has been any change.  CT brain to be repeated - lesions shrinking and decreased in overall number - c/w septic emboli       Encephalopathy   Assessment & Plan    Likely toxic metabolic from infection  Waxes and wanes    4/2 resolving  Encourage activity  Will need skilled nursing facility placement and, [place],  assisting         Mass of iliopsoas muscle group   Assessment & Plan    Return of fluid collection, repeat drainage in IR with cultures, drain placed 3/29 - 10ml purulent  material drained and sent for culture - no growth a/o 3/30  Was on linezolid and cefepime and now on vanco/merrem - end date to be 6 weeks of treatment. (4/24/19)  ID consulting - d/w Dr Johnson    4/2 repeat imaging, evaluate for fluid collection next line minimal output from ÁNGEL           Normocytic anemia   Assessment & Plan    stable     RLS (restless legs syndrome)- (present on admission)   Assessment & Plan    Pramipexole       Diabetes mellitus type 2 in obese (HCC)- (present on admission)   Assessment & Plan    Well controlled   Short acting insulin as needed   Accu-Checks, hypoglycemia protocol          History of breast cancer- (present on admission)   Assessment & Plan    Mets vs infection to brain  No known active breast cancer.    4/2 improving lesions on CT with improving encephalopathy  Continue antibiotics per ID     COPD (chronic obstructive pulmonary disease) (HCC)- (present on admission)   Assessment & Plan    Oxygen as needed, Respiratory protocol, Bronchodilators, Incentive spirometry      Urinary tract infection   Assessment & Plan    Culture remain no growth.       Fungal infection of the groin   Assessment & Plan    Started on nystatin powder 3/24     History of deep vein thrombosis- (present on admission)   Assessment & Plan    History of deep vein thrombosis   Was on Rivaroxaban as outpatient.     Held for Bx Mar 11  Restart AC after completing abx treatment         Essential hypertension- (present on admission)   Assessment & Plan    Restarted on losartan and furosemide with hold parameters.     GERD (gastroesophageal reflux disease)- (present on admission)   Assessment & Plan    Omeprazole     Chronic pain- (present on admission)   Assessment & Plan     Multifactorial  Likely secondary to abscess, fracture  Pain control            VTE prophylaxis: scds

## 2019-04-03 ENCOUNTER — APPOINTMENT (OUTPATIENT)
Dept: RADIOLOGY | Facility: MEDICAL CENTER | Age: 69
DRG: 981 | End: 2019-04-03
Attending: INTERNAL MEDICINE
Payer: MEDICARE

## 2019-04-03 LAB
ANION GAP SERPL CALC-SCNC: 9 MMOL/L (ref 0–11.9)
BASOPHILS # BLD AUTO: 0.7 % (ref 0–1.8)
BASOPHILS # BLD: 0.02 K/UL (ref 0–0.12)
BUN SERPL-MCNC: 24 MG/DL (ref 8–22)
CALCIUM SERPL-MCNC: 10.4 MG/DL (ref 8.5–10.5)
CHLORIDE SERPL-SCNC: 98 MMOL/L (ref 96–112)
CO2 SERPL-SCNC: 24 MMOL/L (ref 20–33)
COMMENT 1642: NORMAL
CREAT SERPL-MCNC: 0.88 MG/DL (ref 0.5–1.4)
EOSINOPHIL # BLD AUTO: 0.11 K/UL (ref 0–0.51)
EOSINOPHIL NFR BLD: 3.6 % (ref 0–6.9)
ERYTHROCYTE [DISTWIDTH] IN BLOOD BY AUTOMATED COUNT: 50.6 FL (ref 35.9–50)
GLUCOSE SERPL-MCNC: 99 MG/DL (ref 65–99)
HCT VFR BLD AUTO: 36.9 % (ref 37–47)
HGB BLD-MCNC: 11.6 G/DL (ref 12–16)
IMM GRANULOCYTES # BLD AUTO: 0.02 K/UL (ref 0–0.11)
IMM GRANULOCYTES NFR BLD AUTO: 0.7 % (ref 0–0.9)
LYMPHOCYTES # BLD AUTO: 0.26 K/UL (ref 1–4.8)
LYMPHOCYTES NFR BLD: 8.6 % (ref 22–41)
MAGNESIUM SERPL-MCNC: 1.5 MG/DL (ref 1.5–2.5)
MCH RBC QN AUTO: 25.2 PG (ref 27–33)
MCHC RBC AUTO-ENTMCNC: 31.4 G/DL (ref 33.6–35)
MCV RBC AUTO: 80 FL (ref 81.4–97.8)
MONOCYTES # BLD AUTO: 0.26 K/UL (ref 0–0.85)
MONOCYTES NFR BLD AUTO: 8.6 % (ref 0–13.4)
MORPHOLOGY BLD-IMP: NORMAL
NEUTROPHILS # BLD AUTO: 2.35 K/UL (ref 2–7.15)
NEUTROPHILS NFR BLD: 77.8 % (ref 44–72)
NRBC # BLD AUTO: 0 K/UL
NRBC BLD-RTO: 0 /100 WBC
PLATELET # BLD AUTO: 114 K/UL (ref 164–446)
PLATELET BLD QL SMEAR: NORMAL
POTASSIUM SERPL-SCNC: 4.2 MMOL/L (ref 3.6–5.5)
RBC # BLD AUTO: 4.61 M/UL (ref 4.2–5.4)
RBC BLD AUTO: NORMAL
SODIUM SERPL-SCNC: 131 MMOL/L (ref 135–145)
WBC # BLD AUTO: 3 K/UL (ref 4.8–10.8)

## 2019-04-03 PROCEDURE — 83735 ASSAY OF MAGNESIUM: CPT

## 2019-04-03 PROCEDURE — 700105 HCHG RX REV CODE 258: Performed by: INTERNAL MEDICINE

## 2019-04-03 PROCEDURE — 700102 HCHG RX REV CODE 250 W/ 637 OVERRIDE(OP): Performed by: INTERNAL MEDICINE

## 2019-04-03 PROCEDURE — 99233 SBSQ HOSP IP/OBS HIGH 50: CPT | Performed by: INTERNAL MEDICINE

## 2019-04-03 PROCEDURE — A9270 NON-COVERED ITEM OR SERVICE: HCPCS | Performed by: HOSPITALIST

## 2019-04-03 PROCEDURE — 700102 HCHG RX REV CODE 250 W/ 637 OVERRIDE(OP): Performed by: HOSPITALIST

## 2019-04-03 PROCEDURE — 700111 HCHG RX REV CODE 636 W/ 250 OVERRIDE (IP): Performed by: INTERNAL MEDICINE

## 2019-04-03 PROCEDURE — A9270 NON-COVERED ITEM OR SERVICE: HCPCS | Performed by: INTERNAL MEDICINE

## 2019-04-03 PROCEDURE — 770004 HCHG ROOM/CARE - ONCOLOGY PRIVATE *

## 2019-04-03 PROCEDURE — 97530 THERAPEUTIC ACTIVITIES: CPT

## 2019-04-03 PROCEDURE — 80048 BASIC METABOLIC PNL TOTAL CA: CPT

## 2019-04-03 PROCEDURE — 85025 COMPLETE CBC W/AUTO DIFF WBC: CPT

## 2019-04-03 PROCEDURE — 36415 COLL VENOUS BLD VENIPUNCTURE: CPT

## 2019-04-03 PROCEDURE — 97535 SELF CARE MNGMENT TRAINING: CPT

## 2019-04-03 RX ADMIN — GABAPENTIN 300 MG: 300 CAPSULE ORAL at 17:13

## 2019-04-03 RX ADMIN — FUROSEMIDE 40 MG: 40 TABLET ORAL at 05:20

## 2019-04-03 RX ADMIN — NYSTATIN: 100000 POWDER TOPICAL at 17:14

## 2019-04-03 RX ADMIN — GABAPENTIN 300 MG: 300 CAPSULE ORAL at 05:21

## 2019-04-03 RX ADMIN — OXYCODONE HYDROCHLORIDE 2.5 MG: 5 TABLET ORAL at 03:28

## 2019-04-03 RX ADMIN — VANCOMYCIN HYDROCHLORIDE 700 MG: 100 INJECTION, POWDER, LYOPHILIZED, FOR SOLUTION INTRAVENOUS at 16:46

## 2019-04-03 RX ADMIN — MEROPENEM 2 G: 1 INJECTION, POWDER, FOR SOLUTION INTRAVENOUS at 20:39

## 2019-04-03 RX ADMIN — MEROPENEM 2 G: 1 INJECTION, POWDER, FOR SOLUTION INTRAVENOUS at 07:57

## 2019-04-03 RX ADMIN — POTASSIUM CHLORIDE 40 MEQ: 1500 TABLET, EXTENDED RELEASE ORAL at 05:21

## 2019-04-03 RX ADMIN — PRAMIPEXOLE DIHYDROCHLORIDE 0.25 MG: 0.25 TABLET ORAL at 17:17

## 2019-04-03 RX ADMIN — IBUPROFEN 600 MG: 400 TABLET, FILM COATED ORAL at 17:58

## 2019-04-03 RX ADMIN — NYSTATIN: 100000 POWDER TOPICAL at 05:22

## 2019-04-03 RX ADMIN — IBUPROFEN 600 MG: 400 TABLET, FILM COATED ORAL at 09:14

## 2019-04-03 RX ADMIN — LOSARTAN POTASSIUM 50 MG: 50 TABLET ORAL at 05:21

## 2019-04-03 RX ADMIN — POTASSIUM CHLORIDE 40 MEQ: 1500 TABLET, EXTENDED RELEASE ORAL at 17:13

## 2019-04-03 RX ADMIN — OMEPRAZOLE 20 MG: 20 CAPSULE, DELAYED RELEASE ORAL at 05:21

## 2019-04-03 ASSESSMENT — ENCOUNTER SYMPTOMS
NAUSEA: 0
SORE THROAT: 0
HEARTBURN: 0
SHORTNESS OF BREATH: 0
CONSTIPATION: 0
COUGH: 0
ABDOMINAL PAIN: 0
DIARRHEA: 0
VOMITING: 0
WEAKNESS: 1
DIZZINESS: 0
DEPRESSION: 0
MYALGIAS: 1
BACK PAIN: 0
DIAPHORESIS: 0
FEVER: 0
BLURRED VISION: 0
DOUBLE VISION: 0
WHEEZING: 0
NERVOUS/ANXIOUS: 0
STRIDOR: 0
CHILLS: 0
FOCAL WEAKNESS: 0
BACK PAIN: 1
PALPITATIONS: 0
HEADACHES: 0

## 2019-04-03 NOTE — PROGRESS NOTES
"[facility] Medicine Daily Progress Note    Date of Service  4/3/2019    Chief Complaint  68 y.o. female admitted 3/8/2019 with right hip pain, fever and abnormal brain mri.    Hospital Course    Patient started on empiric antibiotics given fever.  She had abnormal findings on MRI brain and CT showed \"lesion\" on right iliopsoas.  This lesion was biopsied and turned out to be abscess.  She has history of breast cancer and there is also concern of brain lesions being metastatic though their appearance is not typical of this.      Interval Problem Update  3/12 Discussed with Dr Turner for second opinion of brain lesions and based on appearance not able to r/o or r/i any diagnosis.  Given patient's presentation of confusion, hip pain and fever  - this is more supportive of infectious etiology rather than malignant.  Fluid from right gluteal area sent to micro and as of yet - no growth.  Continue zosyn for now.  3/13 Patient with slight improvement in mentation.  Blood cultures and abscess cultures are showing no growth at this time.  TTE being done while I examined patient - no evidence of vegetations on this test.  ID consultation pending - d/w Dr Garcia.  3/14 Patient feeling well today, her pain is present but not as bad as prior to admission.  She was able to follow the conversation today and is agreeable to move forward with the CHAS tomorrow.  I spoke with her brother, René, and updated him on condition yesterday afternoon also.  Will also have PICC placed in next few days as long as blood cultures remain negative as I expect a prolonged antibiotic course of treatment.  3/15 Patient without new complaints, states she needs help to move in bed.  CHAS showed no evidence of vegetations and regular diet resumed.  Culture remains negative and biopsy of brain lesion may prove needed - will watch through the weekend and if mentation does not continue to improve, will discuss with neurosurgery on Monday.  3/16 Patient states she " is okay today, mentation has waxed and waned without significant improvement.  She was febrile earlier today.  No other acute events.  3/17 Patient with significant improvement in mentation today, she is aware of her hospitalization, not falling asleep mid sentence.  Her pain mediations were held most of yesterday and likely far too strong for patient and were affecting her awareness.  Oxycodone 5 discontinued and will use tramadol instead unless pain not well controlled.  CT brain with contrast ordered as a quick scan for comparison to MRI.    3/18 Patient feeling same today, discussed need to discuss with neurosurgery for possible biopsy.  Discussed with Dr Nguyen, he reviewed MRI and CT brain and he feels they are likely metastatic lesions but are far too small to biopsy.  His recommendation is to continue with antibiotics and re-image in 2 weeks to see if any change that would be amenable to biopsy or that would further declare infection.    3/26 Patient mental status continues to wax and wane.  ID ordered MRI lumbar, pelvis and sacrum for evaluation for source of infection, patient with continued low back pain and no real improvement since I saw her 7 days prior with continued antibiotics during this time.  Cultures have not growth pathogen, repeat MRI 3/28.   3/27 Patient somnolent most of the day today, MRI's completed and showing 2 areas of fluid collections, given her history are likely abscesses and will require drainage, CT guided drainage requested and patient NPO at [facility] for this.  D/w ID - cultures will be done on fluid collection.  Repeat CT brain ordered for tomorrow.  3/28 Patient up to chair, diet resumed as lovenox given this am and drainage of abscesses deferred until tomorrow.  Vanco/merrem to continue and cultures will be collected from abscesses.  3/29 Patient went to IR today, had drain placed in the right buttock into abscess cavity and fluid sent for culture.  Potassium is slightly low,  continue with PO replacement.  HR has improved from yesterday but patient PO intake still not at normal level, increase IVF rate to 100cc/hour.  3/30 Patient without fever since drain placed right buttock.  Purulent material in ÁNGEL bulb. Cultures thus far are still showing no growth.  Antibiotics to continue.  CT head ordered to evaluate change in past 2 weeks as recommended by neurosurgery.  3/31 Patient with fevers overnight - was altered during this time but has recovered.  She denies pain when examined by me.  She still has purulent material in the drainage tubing.  Will continue with current IV antibiotics - 6 weeks total antibiotics suspected - end date would be 4/24/19.  D/w ID.  4/1 Patient remains intermittently somnolent.  She wakes easily but falls back to sleep quickly.  She remained afebrile overnight.  ÁNGEL drain continues to drain purulent material.  ABX through 4/24 - once accepting facility found, patient okay to transfer with midline intact for completion of antibiotics.  4/2 patient up in chair, per RN, required minimal assist for transfer  Improving strength  Minimal output from ÁNGEL drain    Consultants/Specialty  ID - santino/Alex    Code Status  full    Disposition  Likely needs long-term IV antibiotics  Concern for iliac osteomyelitis, we will follow-up cultures and IR drain today  Has a midline, will likely need to change to PICC line for antibiotics, we will follow-up with ID for recommendations      Review of Systems  Review of Systems   Constitutional: Negative for chills, diaphoresis, fever and malaise/fatigue.   HENT: Negative for congestion.    Eyes: Negative for blurred vision and double vision.   Respiratory: Negative for cough and shortness of breath.    Cardiovascular: Negative for chest pain and leg swelling.   Gastrointestinal: Negative for abdominal pain, constipation, diarrhea, heartburn, nausea and vomiting.   Genitourinary: Negative for dysuria, hematuria and urgency.    Musculoskeletal: Positive for myalgias. Negative for back pain.   Skin: Negative for itching and rash.   Neurological: Positive for weakness. Negative for dizziness, focal weakness and headaches.   Psychiatric/Behavioral: Negative for depression. The patient is not nervous/anxious.         Physical Exam  Temp:  [36.2 °C (97.2 °F)-36.6 °C (97.8 °F)] 36.6 °C (97.8 °F)  Pulse:  [] 101  Resp:  [16-20] 19  BP: (130-142)/(70-81) 131/81  SpO2:  [91 %-94 %] 92 %    Physical Exam   Constitutional: She is oriented to person, place, and time. She appears well-developed and well-nourished.   HENT:   Head: Normocephalic and atraumatic.   Mouth/Throat: No oropharyngeal exudate.   Eyes: Conjunctivae are normal.   Neck: Neck supple. No JVD present.   Cardiovascular: Normal rate, regular rhythm and intact distal pulses.  Exam reveals no friction rub.    No murmur heard.  Pulmonary/Chest: Effort normal and breath sounds normal. No respiratory distress.   Abdominal: Soft. Bowel sounds are normal. She exhibits no distension.   Musculoskeletal: She exhibits no edema or tenderness.   Neurological: She is alert and oriented to person, place, and time. No cranial nerve deficit. Coordination normal.   Skin: Skin is warm and dry. No erythema.   Psychiatric: She has a normal mood and affect. Her behavior is normal.   Nursing note and vitals reviewed.      Fluids    Intake/Output Summary (Last 24 hours) at 04/03/19 1714  Last data filed at 04/03/19 1000   Gross per 24 hour   Intake                0 ml   Output              126 ml   Net             -126 ml       Laboratory  Recent Labs      04/01/19   0025  04/02/19   0014  04/03/19   0013   WBC  3.9*  3.3*  3.0*   RBC  4.78  4.64  4.61   HEMOGLOBIN  11.8*  11.6*  11.6*   HEMATOCRIT  38.3  37.3  36.9*   MCV  80.1*  80.4*  80.0*   MCH  24.7*  25.0*  25.2*   MCHC  30.8*  31.1*  31.4*   RDW  49.8  50.4*  50.6*   PLATELETCT  123*  98*  114*   MPV  12.1  10.7   --      Recent Labs       04/01/19   0025  04/02/19   0014  04/03/19   0013   SODIUM  131*  130*  131*   POTASSIUM  3.5*  4.3  4.2   CHLORIDE  97  98  98   CO2  28  26  24   GLUCOSE  103*  93  99   BUN  16  20  24*   CREATININE  0.76  0.76  0.88   CALCIUM  10.3  10.2  10.4                   Imaging  CT-ABDOMEN-PELVIS WITH   Final Result      1.  Rim-enhancing fluid collection in the right iliopsoas muscle may have increased in size slightly from the prior exam.      2.  Pigtail catheter in the right gluteus medius muscle. No residual fluid collection is appreciated.      3.  Pathologic fracture of the right ilium. Status post right SI joint fusion.      4.  Atherosclerosis.      5.  Cholelithiasis.      CT-HEAD WITH & W/O   Final Result      1.  Interval decrease in size and level of enhancement of multiple small punctate foci in both cerebral hemispheres and in the midbrain consistent with improving septic emboli.      2.  No hemorrhage or mass effect.      CT-DRAIN-RETROPERITONEAL   Final Result      1.  CT GUIDED CATHETER DRAINAGE OF RIGHT GLUTEAL ABSCESS.   2.  THE CURRENT PLAN IS TO OBTAIN A FOLLOW-UP CT SCAN IN 5-7 DAYS IF INDICATED. ORDERS WERE WRITTEN FOR ONCE DAILY IRRIGATION OF THE CATHETER WITH 5 ML STERILE SALINE FOR 3 DAYS.   3. THE ILIOPSOAS COLLECTION WAS NOT AMENABLE TO CATHETER DRAINAGE AS INTERVENING BOWEL AND/OR BONY STRUCTURES OBSTRUCT A PATHWAY TO THIS COLLECTION AT THIS TIME.      MR-LUMBAR SPINE-WITH & W/O   Final Result      1.  There are multifocal enhancing fluid collections in the right iliopsoas muscles and gluteus muscles adjacent to the right ilium. Right iliopsoas fluid collection measures an approximately 3.9 x 2.9 cm. The right gluteal fluid collection measures an    approximately 3.5 x 2.8 cm in size. The differential diagnosis includes postsurgical seroma and abscess.   2.  Post operative and degenerative changes as described above.      MR-PELVIS-WITH & W/O AND SEQUENCES   Final Result      1.  Surgical  screw traverses both sacroiliac joint across presumed pathologic fracture of the right sacrum and the hardware obscures the adjacent tissues.      2.  No other evidence for bony metastatic disease      3.  Right gluteal musculature rim-enhancing fluid collection measuring 3.4 x 2.8 cm. This could be a postoperative seroma versus abscess      4.  osteoarthritis of both hip joint      5.  Prior hysterectomy      IR-MIDLINE CATHETER INSERTION WO GUIDANCE > AGE 3   Final Result                  Ultrasound-guided midline placement performed by qualified nursing staff    as above.          CT-HEAD WITH   Final Result      Multiple subcentimeter too numerous to count enhancing masses scattered throughout the supratentorial and infratentorial brain. These demonstrate some surrounding vasogenic edema and most likely represent multifocal metastatic lesions. Other    considerations would include multifocal abscesses or septic emboli.      EC-CHAS W/O CONT   Final Result      EC-CHAS W/O CONT   Final Result      CT-NEEDLE BX-MUSCLE RIGHT   Final Result      CT-guided aspiration of pus like material in the right gluteal muscle lesion.      EC-ECHOCARDIOGRAM COMPLETE W/O CONT   Final Result      OUTSIDE IMAGES-CT CHEST/ABDOMEN/PELVIS   Final Result      OUTSIDE IMAGES-DX CHEST   Final Result      [id number] FILM X-RAY   Final Result      OUTSIDE IMAGES-MR BRAIN   Final Result      OUTSIDE IMAGES-MR BRAIN   Final Result      CT-DRAIN-HEMATOMA - SEROMA    (Results Pending)        Assessment/Plan  * Lesion of brain   Assessment & Plan    Metastatic cancer vs infectious etiology  D/w Dr Blakc for questionable brain mets, recommending IR Bx of psoas muscle   IR Bx of psoas muscle ordered - was abscess  Empiric treatment of infection  TTE and CHAS negative for vegetations.  D/w Dr Nguyen - lesions likely metastatic based on his interpretation of MRI/CT - too small to biopsy, recommends continuing antibiotics and re-imaging in 2  weeks to see if there has been any change.  CT brain to be repeated - lesions shrinking and decreased in overall number - c/w septic emboli       Encephalopathy   Assessment & Plan    Likely toxic metabolic from infection  Waxes and wanes    4/3 resolving  Encourage activity, ambulating with standby assist and front wheel walker    Will need skilled nursing facility placement and, [place],  assisting         Mass of iliopsoas muscle group   Assessment & Plan    Return of fluid collection, repeat drainage in IR with cultures, drain placed 3/29 - 10ml purulent material drained and sent for culture - no growth a/o 3/30  Was on linezolid and cefepime and now on vanco/merrem - end date to be 6 weeks of treatment. (4/24/19)  ID consulting - d/w Dr Johnson    4/2 repeat imaging, evaluate for fluid collection next line minimal output from ÁNGEL    4/3 iliopsoas abscess, slightly increasing in size, discussed with IR, to drain today  N.p.o.  Discontinue drainage for gluteal abscess, resolved  Discussed with ID  Continue antibiotics    PX surgery, Dr. Porter consulted, concern for likely osteomyelitis  We will follow-up cultures, will need long-term IV antibiotics           Normocytic anemia   Assessment & Plan    stable     RLS (restless legs syndrome)- (present on admission)   Assessment & Plan    Pramipexole       Diabetes mellitus type 2 in obese (HCC)- (present on admission)   Assessment & Plan    Well controlled   Short acting insulin as needed   Accu-Checks, hypoglycemia protocol          History of breast cancer- (present on admission)   Assessment & Plan    Mets vs infection to brain  No known active breast cancer.    4/2 improving lesions on CT with improving encephalopathy  Continue antibiotics per ID     COPD (chronic obstructive pulmonary disease) (HCC)- (present on admission)   Assessment & Plan    Oxygen as needed, Respiratory protocol, Bronchodilators, Incentive spirometry      Urinary tract  infection   Assessment & Plan    Culture remain no growth.       Fungal infection of the groin   Assessment & Plan    on nystatin powder 3/24     History of deep vein thrombosis- (present on admission)   Assessment & Plan    History of deep vein thrombosis   Was on Rivaroxaban as outpatient.     Held for Bx Mar 11  Restart AC after completing abx treatment         Essential hypertension- (present on admission)   Assessment & Plan    Restarted on losartan and furosemide with hold parameters.     GERD (gastroesophageal reflux disease)- (present on admission)   Assessment & Plan    Omeprazole     Chronic pain- (present on admission)   Assessment & Plan     Multifactorial  Likely secondary to abscess, fracture  Pain control            VTE prophylaxis: scds

## 2019-04-03 NOTE — PROGRESS NOTES
Pharmacy Kinetics 68 y.o. female on vancomycin day # 10 4/3/2019    Currently on Vancomycin 700 mg iv q24hr     Indication for Treatment:  empiric for possible CNS infection     Pertinent history per medical record: Admitted on 3/8/2019 for iliopsoas mass and multiple brain lesions.  There is concern that this is infectious.  All cultures are negative to date, but were drawn while on pip/tazo. CHAS was negative.  Patient has a history of breast cancer and DM.  Wound was drained on 3/29 showing purulent discharge (cultures remain negative).  ID is following.  Plan for completion of abx in house prior to transfer to [facility] in [place].     Other antibiotics: Meropenem 2 g IV q12h     Allergies: Patient has no known allergies.      List concerns for renal function: BUN:SCr > 20:1, advanced age     Pertinent cultures to date:   3/11/19 - wound (R gluteal muscle) x 2: Negative  3/12/19 - blood (peripheral) x 2: Negative  3/29/19 - wound (R buttock): x 2: NGTD     MRSA nares swab if pneumonia is a concern (ordered/positive/negative/n-a): N/A    Recent Labs      19   0025  19   0014  19   0013   WBC  3.9*  3.3*  3.0*   NEUTSPOLYS  64.10  68.90  77.80*     Recent Labs      19   0025  19   0014  19   0013   BUN  16  20  24*   CREATININE  0.76  0.76  0.88     Recent Labs      19   1731   [facility]  18.3     Intake/Output Summary (Last 24 hours) at 19 1551  Last data filed at 19 1000   Gross per 24 hour   Intake                0 ml   Output              126 ml   Net             -126 ml      /70   Pulse (!) 110   Temp 36.5 °C (97.7 °F) (Temporal)   Resp 19   Ht 1.524 m (5')   Wt 62.5 kg (137 lb 12.6 oz)   SpO2 94%  Temp (24hrs), Av.3 °C (97.4 °F), Min:36.2 °C (97.2 °F), Max:36.5 °C (97.7 °F)      A/P   1. Vancomycin dose change: none  2. Next vancomycin level: ~3 days or sooner if indicated  3. Goal trough: 16-20mcg/mL  4. Comments: Renal function appears  stable with adequate voids. Per MD notes ABX through 4/24 - once accepting facility found, patient okay to transfer with midline intact for completion of antibiotics. Vancomycin trough 4/1/19 within goal therapeutic range. Continue current regimen. Pharmacy will monitor/adjust as needed per protocol.     ZULEIKA Bojorquez, Pharm.D.

## 2019-04-03 NOTE — PROGRESS NOTES
/76   Pulse 81   Temp 36.2 °C (97.2 °F) (Oral)   Resp 16   Ht 1.524 m (5')   Wt 62.5 kg (137 lb 12.6 oz)   SpO2 91%   Breastfeeding? No   BMI 26.91 kg/m²     Pt is AOX4. Denies SOB, chest pain, n/v. Pain is between 3-5- medicated per MAR. POC discussed. Bed is locked in lowest position.

## 2019-04-03 NOTE — CARE PLAN
Problem: Mobility  Goal: Risk for activity intolerance will decrease  Outcome: PROGRESSING AS EXPECTED  Educated patient on goal of mobility. Patient instructed to eat all meals oob and to use bedside commode for all bathroom needs.

## 2019-04-03 NOTE — PROGRESS NOTES
Infectious Disease Progress Note    Author: Beatriz Johnson M.D. Date & Time of service: 4/3/2019  4:54 PM    Chief Complaint:  Multiple brain lesions  Iliopsoas lesion    Interval History:  68-year-old female with history of diabetes and breast cancer, admitted 3/8/2019 with abdominal pain, iliopsoas lesion, and an abnormal MRI with multiple brain lesions.  3/14 AF (99.2) WBC 5.8 more awake today as compared to yesterday but not oriented-denies pain, following commands. ECHO reviewed  3/15 AF (99.1) no WBC today Had CHAS today  3/16 temp 100.5 WBC 7.9 more confused today-naked but pushing off sheets  3/17 AF WBC not done Less obtunded today-not oriented. Denies pain, N/V, HA, visual changes etc  3/18 afebrile WBC 6 patient sitting up in bed eating breakfast, she is quite soft spoken.  She denies any headache, nausea or vomiting.  Endorses a mild cough.  3/19 AF pt denies any new symptoms, no HA. Brain lesions too small for biopsy per notes. Neurosurgery recommends repeating scan in 2 weeks.  3/20 afebrile patient is sleeping and difficult to arouse  3/21 patient is sitting up in bed and is alert and awake.  She however appears to have some waxing confusion but no she is in the hospital in [place].  Complains of lower back pain but no headache, nausea or vomiting  3/22 AF no CBC, continues to have bowel incontinence but not watery per RN, pt has no new complaints today, not engaged in conversation  3/23 afebrile WBC 6.3 patient complaining of stool incontinence and diarrhea, midline placed yesterday  3/24 afebrile patient rested comfortably without any new complaints  3/25 T- max 98.5, no new labs since 03/23. Reports mild HA and weakness. Emotional when asked about hx of hip fx.  3/26 afebrile, wbc 8.0. Reports feeling agitated. Per RN appeared confused throughout the night  3/27 no change overnight. RN reports agitation overnight. Pt sleeping, difficult to wake this am  3/28 reports slight improvement from yesterday.  Moving LE easier after abscess drained by primary team.   3/29/2019 no fevers.  WBC is down to 3.4.  Underwent drainage from the right gluteal area.  It was purulent.  3/30/2019-no fevers.  Remains slightly confused.  WBC is 3.9  3/31/2019 had fevers up to 102.6.  Complains of some right hand weakness.  No new issues overnight.  Family at bedside.  2019 no fevers.  Easily arousable.  ÁNGEL continues to drain.  2019-no fevers.  Awake and responsive but feels weak.  There is minimal drainage from the ÁNGEL.  As per the nursing the patient is incontinent of stool.  But when you talk to her she states she is just is not able to get up on time.  4/3/2019-complains of some weakness in the hand. No fevers. The drain is not draining much  Labs Reviewed    Review of Systems:  Review of Systems   Constitutional: Negative for chills and fever.   HENT: Negative for sore throat.    Respiratory: Negative for cough, shortness of breath, wheezing and stridor.    Cardiovascular: Negative for chest pain and palpitations.   Gastrointestinal: Negative for abdominal pain, diarrhea, nausea and vomiting.   Genitourinary: Negative for dysuria.   Musculoskeletal: Positive for back pain.        Improving   Skin: Negative for itching and rash.   Neurological: Positive for weakness. Negative for dizziness and headaches.        Says she is having difficulty finding words and complains of some right-sided hand weakness   Limited review of systems secondary to intermittent confusion    Hemodynamics:  Temp (24hrs), Av.3 °C (97.4 °F), Min:36.2 °C (97.2 °F), Max:36.5 °C (97.7 °F)  Temperature: 36.5 °C (97.7 °F)  Pulse  Av.2  Min: 70  Max: 153  Blood Pressure : 130/70       Physical Exam:  Physical Exam   Constitutional: She is oriented to person, place, and time. No distress.   HENT:   Head: Normocephalic and atraumatic.   Mouth/Throat: Oropharynx is clear and moist. No oropharyngeal exudate.   Eyes: Pupils are equal, round, and reactive  to light. EOM are normal. Right eye exhibits no discharge. Left eye exhibits no discharge.   Neck: Neck supple. No JVD present.   Cardiovascular: Normal rate and intact distal pulses.    Murmur heard.  Pulmonary/Chest: Effort normal. No respiratory distress. She has no wheezes.   Abdominal: Soft. Bowel sounds are normal. She exhibits no distension. There is no tenderness.   Musculoskeletal: She exhibits no tenderness or deformity.   Right-sided hip drain with no significant drainage   Lymphadenopathy:     She has no cervical adenopathy.   Neurological: She is alert and oriented to person, place, and time.   Moving all extremities  Answering appropriately   Skin: Skin is warm. No rash noted. She is not diaphoretic.   Well-healed left breast mastectomy scar   Nursing note and vitals reviewed.      Meds:    Current Facility-Administered Medications:   •  furosemide  •  potassium chloride SA  •  loperamide  •  vancomycin  •  meropenem  •  MD Alert...Vancomycin per Pharmacy  •  nystatin  •  labetalol  •  ibuprofen  •  haloperidol lactate  •  pramipexole  •  tramadol  •  losartan  •  gabapentin  •  omeprazole  •  Respiratory Care per Protocol  •  acetaminophen  •  Notify provider if pain remains uncontrolled **AND** Use the numeric rating scale (NRS-11) on regular floors and Critical-Care Pain Observation Tool (CPOT) on ICUs/Trauma to assess pain **AND** Pulse Ox (Oximetry) **AND** Pharmacy Consult Request **AND** If patient difficult to arouse and/or has respiratory depression, stop any opiates that are currently infusing and call a Rapid Response. **AND** oxyCODONE immediate-release **AND** [DISCONTINUED] oxyCODONE immediate-release **AND** morphine injection  •  ondansetron  •  ondansetron    Labs:  Recent Labs      04/01/19   0025  04/02/19   0014  04/03/19   0013   WBC  3.9*  3.3*  3.0*   RBC  4.78  4.64  4.61   HEMOGLOBIN  11.8*  11.6*  11.6*   HEMATOCRIT  38.3  37.3  36.9*   MCV  80.1*  80.4*  80.0*   MCH  24.7*   25.0*  25.2*   RDW  49.8  50.4*  50.6*   PLATELETCT  123*  98*  114*   MPV  12.1  10.7   --    NEUTSPOLYS  64.10  68.90  77.80*   LYMPHOCYTES  17.80*  14.50*  8.60*   MONOCYTES  16.20*  12.90  8.60   EOSINOPHILS  1.30  3.10  3.60   BASOPHILS  0.30  0.30  0.70   RBCMORPHOLO   --    --   #CRI     Recent Labs      04/01/19   0025  04/02/19 0014  04/03/19 0013   SODIUM  131*  130*  131*   POTASSIUM  3.5*  4.3  4.2   CHLORIDE  97  98  98   CO2  28  26  24   GLUCOSE  103*  93  99   BUN  16  20  24*     Recent Labs      04/01/19 0025 04/02/19 0014 04/03/19 0013   CREATININE  0.76  0.76  0.88       Imaging:  Ct-needle Bx-muscle Right    Result Date: 3/13/2019  3/11/2019 4:44 PM HISTORY/REASON FOR EXAM:  Right gluteal hypodense lesion. Moderate sedation was administered with continuous monitoring of the patient under the direct supervision of  Medications: 2 mg of Versed and 200 mcg of fentanyl Total sedation time 60 minutes Procedure: After the informed consent the patient was placed on the CT table on prone position. Localization CT scan was obtained. It demonstrates hypodense area in the right gluteus muscle. Subsequently a 17-gauge needle was advanced into the lesion. 20  mL of pus was aspirated. The materials were sent to the pathology. The patient tolerated the procedure without any immediate competition.     CT-guided aspiration of pus like material in the right gluteal muscle lesion.    Ec-echocardiogram Complete W/o Cont    Result Date: 3/13/2019  Transthoracic Echo Report Echocardiography Laboratory CONCLUSIONS No prior study is available for comparison. Normal left ventricular systolic function. Left ventricular ejection fraction is visually estimated to be 65%. Normal diastolic function. Normal inferior vena cava size and inspiratory collapse. Aortic sclerosis without stenosis. Estimated right ventricular systolic pressure  is 33 mmHg. No obvious valvular vegetations are noted on a decent  "quality study.  If further valvular assessment is clinically indicated, consider transesophageal echocardiogram. SAM,  LV EF:  65    % FINDINGS Left Ventricle Normal left ventricular size and systolic function. Normal left ventricular wall thickness. Left ventricular ejection fraction is visually estimated to be 65%. Normal diastolic function. Right Ventricle Normal right ventricular size and systolic function. Right Atrium Normal right atrial size. Normal inferior vena cava size and inspiratory collapse. Left Atrium Normal left atrial size. Mitral Valve Structurally normal mitral valve without significant stenosis or regurgitation. Aortic Valve Aortic sclerosis without stenosis. No aortic insufficiency. Tricuspid Valve Structurally normal tricuspid valve. Mild tricuspid regurgitation. Right atrial pressure is estimated to be 3 mmHg. Estimated right ventricular systolic pressure  is 33 mmHg. Pulmonic Valve The pulmonic valve is not well visualized. No pulmonic insufficiency. Pericardium Normal pericardium without effusion. Aorta The aortic root is normal.  Ascending aorta diameter is 3.0 cm. Claire Tripathi MD (Electronically Signed) Final Date:     13 March 2019                 14:38      Micro:  Results     Procedure Component Value Units Date/Time    BLOOD CULTURE [[id number]] Collected:  03/31/19 1504    Order Status:  Completed Specimen:  Blood from Peripheral Updated:  04/01/19 0854     Significant Indicator NEG     Source BLD     Site PERIPHERAL     Blood Culture No Growth    Note: Blood cultures are incubated for 5 days and  are monitored continuously.Positive blood cultures  are called to the RN and reported as soon as  they are identified.      Narrative:       Per Hospital Policy: Only change Specimen Src: to \"Line\" if  specified by physician order.    BLOOD CULTURE [[id number]] Collected:  03/31/19 1504    Order Status:  Completed Specimen:  Blood from Peripheral Updated:  04/01/19 0854     Significant " "Indicator NEG     Source BLD     Site PERIPHERAL     Blood Culture No Growth    Note: Blood cultures are incubated for 5 days and  are monitored continuously.Positive blood cultures  are called to the RN and reported as soon as  they are identified.      Narrative:       Per Hospital Policy: Only change Specimen Src: to \"Line\" if  specified by physician order.    CULTURE WOUND W/ GRAM STAIN [[id number]] Collected:  03/29/19 1030    Order Status:  Completed Specimen:  Wound Updated:  04/01/19 0841     Significant Indicator NEG     Source WND     Site Right Buttock     Culture Result Wound No growth at 72 hours     Gram Stain Result Moderate WBCs.  No organisms seen.      Narrative:       Collected By:[id number] PICKENS  RIGHT Gluteal fluid aspirate x1 ml, collected by Dr. Persaud,  ordered by Dr. Keller, for gram stain and C&S.    GRAM STAIN [[id number]] Collected:  03/29/19 1030    Order Status:  Completed Specimen:  Wound Updated:  03/29/19 1401     Significant Indicator .     Source WND     Site Right Buttock     Gram Stain Result Moderate WBCs.  No organisms seen.      Narrative:       Collected By:[id number] JUDITH PICKENS  RIGHT Gluteal fluid aspirate x1 ml, collected by Dr. Persaud,  ordered by Dr. Keller, for gram stain and C&S.    FLUID CULTURE W/GRAM STAIN [[id number]] Collected:  03/29/19 1030    Order Status:  Canceled Specimen:  Other from Other Body Fluid           Assessment:  Active Hospital Problems    Diagnosis   • *Lesion of brain [G93.9]   • Mass of iliopsoas muscle group [M62.89]   • Encephalopathy [G93.40]   • Hyponatremia [E87.1]   • History of breast cancer [Z85.3]   • Diabetes mellitus type 2 in obese (HCC) [E11.69, E66.9]       Plan:  Multiple brain lesions  Low-grade fevers resolved  Wbc 4.5  Confusion resolving  MRI with scattered small lesions incl aaron, too small to biopsy per neurosurgery  TTE neg; CHAS neg  Bcxs neg to date  Repeat CT scan on 3/30/2019 shows decrease in the size of the " abscesses  Repeat MRI prior to completion of abx  Discussed with the patient regarding calling the staff when she has to go to the bathroom.  Commode will be placed at her bedside.    Iliopsoas mass, on treatment  CT + 2.6 cm lesion in the right iliopsoas region  S/p aspiration +WBCs-cultures negative to date  Bcxs remain neg  D/c'd Vanco secondary to elevated Vanco troughs 03/23 and patient not clearing well, restarted 03/25  D/c cefepime 03/26 - may be contributing to confusion  Tolerating Meropenem started 03/26,  Midline pending  MRI w/ contrast lumbar, pelvis 03/27 - Iliopsoas and gluteal fluid collections noted.    Drain placed 3/29/2019  Cultures are negative so far  Aim for at least 4-6 weeks of antibiotics  Repeat CT scan shows resolution of the abscess in the gluteus but increase in the iliopsoas  Pull that drain and IR to place a new drain in the iliopsoas    New fevers resolved  Resolving  Blood cultures x2 were negative from 3/31/2019    H/o breast cancer  Markedly elevated alk phos  Recommend continued diaz for mets    Type 2 diabetes mellitus  Hemoglobin A1c 6.3  Keep BS under 150 to help control current infection    I have performed a physical exam and reviewed and updated ROS and plan today 4/3/2019.  In review of yesterday's note 4/2/2019, there are no changes except as documented above.      Discussed with Dr. Patricia

## 2019-04-03 NOTE — CARE PLAN
Problem: Urinary Elimination:  Goal: Ability to reestablish a normal urinary elimination pattern will improve  Outcome: PROGRESSING AS EXPECTED  Patient educated to call when needing to use the bathroom, to not just be incontinent purposely. Pt agreeable to call for bathroom needs and use BSC.

## 2019-04-04 ENCOUNTER — APPOINTMENT (OUTPATIENT)
Dept: RADIOLOGY | Facility: MEDICAL CENTER | Age: 69
DRG: 981 | End: 2019-04-04
Attending: INTERNAL MEDICINE
Payer: MEDICARE

## 2019-04-04 LAB
ANION GAP SERPL CALC-SCNC: 12 MMOL/L (ref 0–11.9)
BASOPHILS # BLD AUTO: 0.6 % (ref 0–1.8)
BASOPHILS # BLD: 0.02 K/UL (ref 0–0.12)
BUN SERPL-MCNC: 23 MG/DL (ref 8–22)
CALCIUM SERPL-MCNC: 11.5 MG/DL (ref 8.5–10.5)
CHLORIDE SERPL-SCNC: 100 MMOL/L (ref 96–112)
CO2 SERPL-SCNC: 21 MMOL/L (ref 20–33)
CREAT SERPL-MCNC: 0.84 MG/DL (ref 0.5–1.4)
EOSINOPHIL # BLD AUTO: 0.55 K/UL (ref 0–0.51)
EOSINOPHIL NFR BLD: 15.7 % (ref 0–6.9)
ERYTHROCYTE [DISTWIDTH] IN BLOOD BY AUTOMATED COUNT: 50.7 FL (ref 35.9–50)
GLUCOSE SERPL-MCNC: 97 MG/DL (ref 65–99)
HCT VFR BLD AUTO: 43.7 % (ref 37–47)
HGB BLD-MCNC: 13.9 G/DL (ref 12–16)
IMM GRANULOCYTES # BLD AUTO: 0.04 K/UL (ref 0–0.11)
IMM GRANULOCYTES NFR BLD AUTO: 1.1 % (ref 0–0.9)
LYMPHOCYTES # BLD AUTO: 0.8 K/UL (ref 1–4.8)
LYMPHOCYTES NFR BLD: 22.8 % (ref 22–41)
MCH RBC QN AUTO: 25.4 PG (ref 27–33)
MCHC RBC AUTO-ENTMCNC: 31.8 G/DL (ref 33.6–35)
MCV RBC AUTO: 79.7 FL (ref 81.4–97.8)
MONOCYTES # BLD AUTO: 0.43 K/UL (ref 0–0.85)
MONOCYTES NFR BLD AUTO: 12.3 % (ref 0–13.4)
NEUTROPHILS # BLD AUTO: 1.67 K/UL (ref 2–7.15)
NEUTROPHILS NFR BLD: 47.5 % (ref 44–72)
NRBC # BLD AUTO: 0 K/UL
NRBC BLD-RTO: 0 /100 WBC
PLATELET # BLD AUTO: 157 K/UL (ref 164–446)
PMV BLD AUTO: 11.4 FL (ref 9–12.9)
POTASSIUM SERPL-SCNC: 4.7 MMOL/L (ref 3.6–5.5)
RBC # BLD AUTO: 5.48 M/UL (ref 4.2–5.4)
SODIUM SERPL-SCNC: 133 MMOL/L (ref 135–145)
WBC # BLD AUTO: 3.5 K/UL (ref 4.8–10.8)

## 2019-04-04 PROCEDURE — A9270 NON-COVERED ITEM OR SERVICE: HCPCS | Performed by: HOSPITALIST

## 2019-04-04 PROCEDURE — 80048 BASIC METABOLIC PNL TOTAL CA: CPT

## 2019-04-04 PROCEDURE — 770004 HCHG ROOM/CARE - ONCOLOGY PRIVATE *

## 2019-04-04 PROCEDURE — 700102 HCHG RX REV CODE 250 W/ 637 OVERRIDE(OP): Performed by: HOSPITALIST

## 2019-04-04 PROCEDURE — 05HY33Z INSERTION OF INFUSION DEVICE INTO UPPER VEIN, PERCUTANEOUS APPROACH: ICD-10-PCS | Performed by: INTERNAL MEDICINE

## 2019-04-04 PROCEDURE — 700111 HCHG RX REV CODE 636 W/ 250 OVERRIDE (IP): Performed by: HOSPITALIST

## 2019-04-04 PROCEDURE — 700111 HCHG RX REV CODE 636 W/ 250 OVERRIDE (IP)

## 2019-04-04 PROCEDURE — A9270 NON-COVERED ITEM OR SERVICE: HCPCS | Performed by: INTERNAL MEDICINE

## 2019-04-04 PROCEDURE — 0K9N30Z DRAINAGE OF RIGHT HIP MUSCLE WITH DRAINAGE DEVICE, PERCUTANEOUS APPROACH: ICD-10-PCS | Performed by: RADIOLOGY

## 2019-04-04 PROCEDURE — 99232 SBSQ HOSP IP/OBS MODERATE 35: CPT | Performed by: INTERNAL MEDICINE

## 2019-04-04 PROCEDURE — 700101 HCHG RX REV CODE 250

## 2019-04-04 PROCEDURE — 77012 CT SCAN FOR NEEDLE BIOPSY: CPT

## 2019-04-04 PROCEDURE — 87205 SMEAR GRAM STAIN: CPT

## 2019-04-04 PROCEDURE — 99233 SBSQ HOSP IP/OBS HIGH 50: CPT | Performed by: INTERNAL MEDICINE

## 2019-04-04 PROCEDURE — 700102 HCHG RX REV CODE 250 W/ 637 OVERRIDE(OP): Performed by: INTERNAL MEDICINE

## 2019-04-04 PROCEDURE — 700111 HCHG RX REV CODE 636 W/ 250 OVERRIDE (IP): Performed by: RADIOLOGY

## 2019-04-04 PROCEDURE — 700111 HCHG RX REV CODE 636 W/ 250 OVERRIDE (IP): Performed by: INTERNAL MEDICINE

## 2019-04-04 PROCEDURE — 700105 HCHG RX REV CODE 258: Performed by: INTERNAL MEDICINE

## 2019-04-04 PROCEDURE — 97530 THERAPEUTIC ACTIVITIES: CPT

## 2019-04-04 PROCEDURE — 87070 CULTURE OTHR SPECIMN AEROBIC: CPT

## 2019-04-04 PROCEDURE — 97116 GAIT TRAINING THERAPY: CPT

## 2019-04-04 PROCEDURE — 36415 COLL VENOUS BLD VENIPUNCTURE: CPT

## 2019-04-04 PROCEDURE — 85025 COMPLETE CBC W/AUTO DIFF WBC: CPT

## 2019-04-04 RX ORDER — MIDAZOLAM HYDROCHLORIDE 1 MG/ML
.5-2 INJECTION INTRAMUSCULAR; INTRAVENOUS PRN
Status: DISCONTINUED | OUTPATIENT
Start: 2019-04-04 | End: 2019-04-04 | Stop reason: HOSPADM

## 2019-04-04 RX ORDER — SODIUM CHLORIDE 9 MG/ML
500 INJECTION, SOLUTION INTRAVENOUS
Status: DISCONTINUED | OUTPATIENT
Start: 2019-04-04 | End: 2019-04-04 | Stop reason: HOSPADM

## 2019-04-04 RX ORDER — MIDAZOLAM HYDROCHLORIDE 1 MG/ML
INJECTION INTRAMUSCULAR; INTRAVENOUS
Status: COMPLETED
Start: 2019-04-04 | End: 2019-04-04

## 2019-04-04 RX ORDER — NALOXONE HYDROCHLORIDE 0.4 MG/ML
INJECTION, SOLUTION INTRAMUSCULAR; INTRAVENOUS; SUBCUTANEOUS
Status: DISPENSED
Start: 2019-04-04 | End: 2019-04-05

## 2019-04-04 RX ORDER — ONDANSETRON 2 MG/ML
4 INJECTION INTRAMUSCULAR; INTRAVENOUS PRN
Status: DISCONTINUED | OUTPATIENT
Start: 2019-04-04 | End: 2019-04-04 | Stop reason: HOSPADM

## 2019-04-04 RX ADMIN — MORPHINE SULFATE 1 MG: 4 INJECTION INTRAVENOUS at 03:28

## 2019-04-04 RX ADMIN — MORPHINE SULFATE 2 MG: 4 INJECTION INTRAVENOUS at 09:54

## 2019-04-04 RX ADMIN — MORPHINE SULFATE 1 MG: 4 INJECTION INTRAVENOUS at 02:00

## 2019-04-04 RX ADMIN — NYSTATIN: 100000 POWDER TOPICAL at 05:28

## 2019-04-04 RX ADMIN — PRAMIPEXOLE DIHYDROCHLORIDE 0.25 MG: 0.25 TABLET ORAL at 09:56

## 2019-04-04 RX ADMIN — VANCOMYCIN HYDROCHLORIDE 700 MG: 100 INJECTION, POWDER, LYOPHILIZED, FOR SOLUTION INTRAVENOUS at 17:19

## 2019-04-04 RX ADMIN — POTASSIUM CHLORIDE 40 MEQ: 1500 TABLET, EXTENDED RELEASE ORAL at 20:35

## 2019-04-04 RX ADMIN — GABAPENTIN 300 MG: 300 CAPSULE ORAL at 20:35

## 2019-04-04 RX ADMIN — NYSTATIN: 100000 POWDER TOPICAL at 20:35

## 2019-04-04 RX ADMIN — FENTANYL CITRATE 25 MCG: 50 INJECTION, SOLUTION INTRAMUSCULAR; INTRAVENOUS at 19:16

## 2019-04-04 RX ADMIN — MIDAZOLAM 1 MG: 1 INJECTION INTRAMUSCULAR; INTRAVENOUS at 19:16

## 2019-04-04 RX ADMIN — LIDOCAINE HYDROCHLORIDE: 10 INJECTION, SOLUTION EPIDURAL; INFILTRATION; INTRACAUDAL; PERINEURAL at 18:45

## 2019-04-04 RX ADMIN — FENTANYL CITRATE 25 MCG: 50 INJECTION, SOLUTION INTRAMUSCULAR; INTRAVENOUS at 19:31

## 2019-04-04 RX ADMIN — MIDAZOLAM 1 MG: 1 INJECTION INTRAMUSCULAR; INTRAVENOUS at 19:24

## 2019-04-04 RX ADMIN — FENTANYL CITRATE 50 MCG: 50 INJECTION, SOLUTION INTRAMUSCULAR; INTRAVENOUS at 19:24

## 2019-04-04 RX ADMIN — MEROPENEM 2 G: 1 INJECTION, POWDER, FOR SOLUTION INTRAVENOUS at 22:21

## 2019-04-04 RX ADMIN — MEROPENEM 2 G: 1 INJECTION, POWDER, FOR SOLUTION INTRAVENOUS at 08:38

## 2019-04-04 ASSESSMENT — GAIT ASSESSMENTS
ASSISTIVE DEVICE: FRONT WHEEL WALKER
DISTANCE (FEET): 5
GAIT LEVEL OF ASSIST: MAXIMAL ASSIST
DEVIATION: STEP TO;DECREASED BASE OF SUPPORT;BRADYKINETIC;SHUFFLED GAIT;DECREASED HEEL STRIKE;DECREASED TOE OFF

## 2019-04-04 ASSESSMENT — ENCOUNTER SYMPTOMS
PALPITATIONS: 0
SORE THROAT: 0
NAUSEA: 0
ABDOMINAL PAIN: 0
DIZZINESS: 0
VOMITING: 0
BACK PAIN: 1
CHILLS: 0
HEADACHES: 0
WHEEZING: 0
SHORTNESS OF BREATH: 0
WEAKNESS: 1
STRIDOR: 0
FEVER: 0
COUGH: 0
DIARRHEA: 0

## 2019-04-04 ASSESSMENT — COGNITIVE AND FUNCTIONAL STATUS - GENERAL
SUGGESTED CMS G CODE MODIFIER MOBILITY: CM
MOVING TO AND FROM BED TO CHAIR: UNABLE
WALKING IN HOSPITAL ROOM: A LOT
MOVING FROM LYING ON BACK TO SITTING ON SIDE OF FLAT BED: UNABLE
MOBILITY SCORE: 8
STANDING UP FROM CHAIR USING ARMS: A LOT
TURNING FROM BACK TO SIDE WHILE IN FLAT BAD: UNABLE
CLIMB 3 TO 5 STEPS WITH RAILING: TOTAL

## 2019-04-04 NOTE — THERAPY
"Pt demonstrating decreased strength and balance. pt w/posterior lean when standing, required increased physical assist to intiaite gait and anterior lean. Pt w/slow, step-to mechanics adn poor sequencing, requiring increzsed cuing. Pt would benefit from further acute PT txs to progress towards goals and independence. Recommend post acute placement at an inpatient transitional care facility to address deficits.    Physical Therapy Treatment completed.   Bed Mobility:  Supine to Sit: Minimal Assist  Transfers: Sit to Stand: Moderate Assist  Gait: Level Of Assist: Maximal Assist with Front-Wheel Walker       Plan of Care: Will benefit from Physical Therapy 3 times per week    See \"Rehab Therapy-Acute\" Patient Summary Report for complete documentation.       "

## 2019-04-04 NOTE — THERAPY
"Occupational Therapy Treatment completed with focus on ADLs and ADL transfers.  Functional Status:  CGA to mod assist for functional transfers with FWW; moderate assist up from low toilet; Min assist for LE dressing; supervised with set-up for seated grooming/hygiene   Plan of Care: Will benefit from Occupational Therapy 3 times per week  Discharge Recommendations:  Equipment Will Continue to Assess for Equipment Needs. Post-acute therapy Recommend post acute skilled setting for continued Occupational Therapy treatment     See \"Rehab Therapy-Acute\" Patient Summary Report for complete documentation.   "

## 2019-04-04 NOTE — PROGRESS NOTES
Infectious Disease Progress Note    Author: Beatriz Johnson M.D. Date & Time of service: 4/4/2019  4:53 PM    Chief Complaint:  Multiple brain lesions  Iliopsoas lesion    Interval History:  68-year-old female with history of diabetes and breast cancer, admitted 3/8/2019 with abdominal pain, iliopsoas lesion, and an abnormal MRI with multiple brain lesions.  3/14 AF (99.2) WBC 5.8 more awake today as compared to yesterday but not oriented-denies pain, following commands. ECHO reviewed  3/15 AF (99.1) no WBC today Had CHAS today  3/16 temp 100.5 WBC 7.9 more confused today-naked but pushing off sheets  3/17 AF WBC not done Less obtunded today-not oriented. Denies pain, N/V, HA, visual changes etc  3/18 afebrile WBC 6 patient sitting up in bed eating breakfast, she is quite soft spoken.  She denies any headache, nausea or vomiting.  Endorses a mild cough.  3/19 AF pt denies any new symptoms, no HA. Brain lesions too small for biopsy per notes. Neurosurgery recommends repeating scan in 2 weeks.  3/20 afebrile patient is sleeping and difficult to arouse  3/21 patient is sitting up in bed and is alert and awake.  She however appears to have some waxing confusion but no she is in the hospital in [place].  Complains of lower back pain but no headache, nausea or vomiting  3/22 AF no CBC, continues to have bowel incontinence but not watery per RN, pt has no new complaints today, not engaged in conversation  3/23 afebrile WBC 6.3 patient complaining of stool incontinence and diarrhea, midline placed yesterday  3/24 afebrile patient rested comfortably without any new complaints  3/25 T- max 98.5, no new labs since 03/23. Reports mild HA and weakness. Emotional when asked about hx of hip fx.  3/26 afebrile, wbc 8.0. Reports feeling agitated. Per RN appeared confused throughout the night  3/27 no change overnight. RN reports agitation overnight. Pt sleeping, difficult to wake this am  3/28 reports slight improvement from yesterday.  Moving LE easier after abscess drained by primary team.   3/29/2019 no fevers.  WBC is down to 3.4.  Underwent drainage from the right gluteal area.  It was purulent.  3/30/2019-no fevers.  Remains slightly confused.  WBC is 3.9  3/31/2019 had fevers up to 102.6.  Complains of some right hand weakness.  No new issues overnight.  Family at bedside.  2019 no fevers.  Easily arousable.  ÁNGEL continues to drain.  2019-no fevers.  Awake and responsive but feels weak.  There is minimal drainage from the ÁNGEL.  As per the nursing the patient is incontinent of stool.  But when you talk to her she states she is just is not able to get up on time.  4/3/2019-complains of some weakness in the hand. No fevers. The drain is not draining much  - no fevers. She is anxious since she has been NPO. Awaiting IR drain  Labs Reviewed    Review of Systems:  Review of Systems   Constitutional: Negative for chills and fever.   HENT: Negative for sore throat.    Respiratory: Negative for cough, shortness of breath, wheezing and stridor.    Cardiovascular: Negative for chest pain and palpitations.   Gastrointestinal: Negative for abdominal pain, diarrhea, nausea and vomiting.   Genitourinary: Negative for dysuria.   Musculoskeletal: Positive for back pain.        Improving   Skin: Negative for itching and rash.   Neurological: Positive for weakness. Negative for dizziness and headaches.        Says she is having difficulty finding words and complains of some right-sided hand weakness   Limited review of systems secondary to intermittent confusion    Hemodynamics:  Temp (24hrs), Av °C (98.6 °F), Min:36.6 °C (97.8 °F), Max:37.6 °C (99.6 °F)  Temperature: 37.6 °C (99.6 °F)  Pulse  Av.2  Min: 70  Max: 153  Blood Pressure : 154/101       Physical Exam:  Physical Exam   Constitutional: She is oriented to person, place, and time. No distress.   HENT:   Head: Normocephalic and atraumatic.   Mouth/Throat: Oropharynx is clear and  moist. No oropharyngeal exudate.   Eyes: Pupils are equal, round, and reactive to light. EOM are normal. Right eye exhibits no discharge. Left eye exhibits no discharge.   Neck: Neck supple. No JVD present.   Cardiovascular: Normal rate and intact distal pulses.    Murmur heard.  Pulmonary/Chest: Effort normal. No respiratory distress. She has no wheezes.   Abdominal: Soft. Bowel sounds are normal. She exhibits no distension. There is no tenderness.   Musculoskeletal: She exhibits no tenderness or deformity.   Lymphadenopathy:     She has no cervical adenopathy.   Neurological: She is alert and oriented to person, place, and time.   Moving all extremities  Answering appropriately   Skin: Skin is warm. No rash noted. She is not diaphoretic.   Well-healed left breast mastectomy scar   Nursing note and vitals reviewed.      Meds:    Current Facility-Administered Medications:   •  furosemide  •  potassium chloride SA  •  loperamide  •  vancomycin  •  meropenem  •  MD Alert...Vancomycin per Pharmacy  •  nystatin  •  labetalol  •  ibuprofen  •  haloperidol lactate  •  pramipexole  •  tramadol  •  losartan  •  gabapentin  •  omeprazole  •  Respiratory Care per Protocol  •  acetaminophen  •  Notify provider if pain remains uncontrolled **AND** Use the numeric rating scale (NRS-11) on regular floors and Critical-Care Pain Observation Tool (CPOT) on ICUs/Trauma to assess pain **AND** Pulse Ox (Oximetry) **AND** Pharmacy Consult Request **AND** If patient difficult to arouse and/or has respiratory depression, stop any opiates that are currently infusing and call a Rapid Response. **AND** oxyCODONE immediate-release **AND** [DISCONTINUED] oxyCODONE immediate-release **AND** morphine injection  •  ondansetron  •  ondansetron    Labs:  Recent Labs      04/02/19   0014  04/03/19   0013  04/04/19   0022   WBC  3.3*  3.0*  3.5*   RBC  4.64  4.61  5.48*   HEMOGLOBIN  11.6*  11.6*  13.9   HEMATOCRIT  37.3  36.9*  43.7   MCV  80.4*   80.0*  79.7*   MCH  25.0*  25.2*  25.4*   RDW  50.4*  50.6*  50.7*   PLATELETCT  98*  114*  157*   MPV  10.7   --   11.4   NEUTSPOLYS  68.90  77.80*  47.50   LYMPHOCYTES  14.50*  8.60*  22.80   MONOCYTES  12.90  8.60  12.30   EOSINOPHILS  3.10  3.60  15.70*   BASOPHILS  0.30  0.70  0.60   RBCMORPHOLO   --   #CRI   --      Recent Labs      04/02/19   0014  04/03/19   0013  04/04/19   0022   SODIUM  130*  131*  133*   POTASSIUM  4.3  4.2  4.7   CHLORIDE  98  98  100   CO2  26  24  21   GLUCOSE  93  99  97   BUN  20  24*  23*     Recent Labs      04/02/19   0014  04/03/19   0013  04/04/19   0022   CREATININE  0.76  0.88  0.84       Imaging:  Ct-needle Bx-muscle Right    Result Date: 3/13/2019  3/11/2019 4:44 PM HISTORY/REASON FOR EXAM:  Right gluteal hypodense lesion. Moderate sedation was administered with continuous monitoring of the patient under the direct supervision of  Medications: 2 mg of Versed and 200 mcg of fentanyl Total sedation time 60 minutes Procedure: After the informed consent the patient was placed on the CT table on prone position. Localization CT scan was obtained. It demonstrates hypodense area in the right gluteus muscle. Subsequently a 17-gauge needle was advanced into the lesion. 20  mL of pus was aspirated. The materials were sent to the pathology. The patient tolerated the procedure without any immediate competition.     CT-guided aspiration of pus like material in the right gluteal muscle lesion.    Ec-echocardiogram Complete W/o Cont    Result Date: 3/13/2019  Transthoracic Echo Report Echocardiography Laboratory CONCLUSIONS No prior study is available for comparison. Normal left ventricular systolic function. Left ventricular ejection fraction is visually estimated to be 65%. Normal diastolic function. Normal inferior vena cava size and inspiratory collapse. Aortic sclerosis without stenosis. Estimated right ventricular systolic pressure  is 33 mmHg. No obvious valvular  "vegetations are noted on a decent quality study.  If further valvular assessment is clinically indicated, consider transesophageal echocardiogram. SAM,  LV EF:  65    % FINDINGS Left Ventricle Normal left ventricular size and systolic function. Normal left ventricular wall thickness. Left ventricular ejection fraction is visually estimated to be 65%. Normal diastolic function. Right Ventricle Normal right ventricular size and systolic function. Right Atrium Normal right atrial size. Normal inferior vena cava size and inspiratory collapse. Left Atrium Normal left atrial size. Mitral Valve Structurally normal mitral valve without significant stenosis or regurgitation. Aortic Valve Aortic sclerosis without stenosis. No aortic insufficiency. Tricuspid Valve Structurally normal tricuspid valve. Mild tricuspid regurgitation. Right atrial pressure is estimated to be 3 mmHg. Estimated right ventricular systolic pressure  is 33 mmHg. Pulmonic Valve The pulmonic valve is not well visualized. No pulmonic insufficiency. Pericardium Normal pericardium without effusion. Aorta The aortic root is normal.  Ascending aorta diameter is 3.0 cm. Claire Tripathi MD (Electronically Signed) Final Date:     13 March 2019                 14:38      Micro:  Results     Procedure Component Value Units Date/Time    BLOOD CULTURE [[id number]] Collected:  03/31/19 1504    Order Status:  Completed Specimen:  Blood from Peripheral Updated:  04/01/19 0854     Significant Indicator NEG     Source BLD     Site PERIPHERAL     Blood Culture No Growth    Note: Blood cultures are incubated for 5 days and  are monitored continuously.Positive blood cultures  are called to the RN and reported as soon as  they are identified.      Narrative:       Per Hospital Policy: Only change Specimen Src: to \"Line\" if  specified by physician order.    BLOOD CULTURE [[id number]] Collected:  03/31/19 1504    Order Status:  Completed Specimen:  Blood from Peripheral " "Updated:  04/01/19 0854     Significant Indicator NEG     Source BLD     Site PERIPHERAL     Blood Culture No Growth    Note: Blood cultures are incubated for 5 days and  are monitored continuously.Positive blood cultures  are called to the RN and reported as soon as  they are identified.      Narrative:       Per Hospital Policy: Only change Specimen Src: to \"Line\" if  specified by physician order.    CULTURE WOUND W/ GRAM STAIN [[id number]] Collected:  03/29/19 1030    Order Status:  Completed Specimen:  Wound Updated:  04/01/19 0841     Significant Indicator NEG     Source WND     Site Right Buttock     Culture Result Wound No growth at 72 hours     Gram Stain Result Moderate WBCs.  No organisms seen.      Narrative:       Collected By:[id number] JUDITH PICKENS  RIGHT Gluteal fluid aspirate x1 ml, collected by Dr. Persaud,  ordered by Dr. Keller, for gram stain and C&S.    GRAM STAIN [[id number]] Collected:  03/29/19 1030    Order Status:  Completed Specimen:  Wound Updated:  03/29/19 1401     Significant Indicator .     Source WND     Site Right Buttock     Gram Stain Result Moderate WBCs.  No organisms seen.      Narrative:       Collected By:[id number] JUDITH PICKENS  RIGHT Gluteal fluid aspirate x1 ml, collected by Dr. Persaud,  ordered by Dr. Keller, for gram stain and C&S.    FLUID CULTURE W/GRAM STAIN [[id number]] Collected:  03/29/19 1030    Order Status:  Canceled Specimen:  Other from Other Body Fluid           Assessment:  Active Hospital Problems    Diagnosis   • *Lesion of brain [G93.9]   • Mass of iliopsoas muscle group [M62.89]   • Encephalopathy [G93.40]   • Hyponatremia [E87.1]   • History of breast cancer [Z85.3]   • Diabetes mellitus type 2 in obese (HCC) [E11.69, E66.9]       Plan:  Multiple brain lesions  Low-grade fevers resolved  Wbc 4.5  Confusion resolving  MRI with scattered small lesions incl aaron, too small to biopsy per neurosurgery  TTE neg; CHAS neg  Bcxs neg to date  Repeat CT scan on 3/30/2019 shows " decrease in the size of the abscesses  Repeat MRI prior to completion of abx  Discussed with the patient regarding calling the staff when she has to go to the bathroom.  Commode will be placed at her bedside.    Iliopsoas mass, on treatment  CT + 2.6 cm lesion in the right iliopsoas region  S/p aspiration +WBCs-cultures negative to date  Bcxs remain neg  D/c'd Vanco secondary to elevated Vanco troughs 03/23 and patient not clearing well, restarted 03/25  D/c cefepime 03/26 - may be contributing to confusion  Tolerating Meropenem started 03/26,  Midline pending  MRI w/ contrast lumbar, pelvis 03/27 - Iliopsoas and gluteal fluid collections noted.    Drain placed 3/29/2019  Cultures are negative so far  Aim for at least 4-6 weeks of antibiotics  Repeat CT scan shows resolution of the abscess in the gluteus but increase in the iliopsoas  Pull that drain and IR to place a new drain in the iliopsoas. The procedure is pending.    New fevers resolved  Resolving  Blood cultures x2 were negative from 3/31/2019    H/o breast cancer  Markedly elevated alk phos  Recommend continued diaz for mets    Type 2 diabetes mellitus  Hemoglobin A1c 6.3  Keep BS under 150 to help control current infection    I have performed a physical exam and reviewed and updated ROS and plan today 4/4/2019.  In review of yesterday's note 4/3/2019, there are no changes except as documented above.      Discussed with Dr. Patricia

## 2019-04-04 NOTE — PROGRESS NOTES
"/67   Pulse 90   Temp 37.1 °C (98.8 °F) (Temporal)   Resp 18   Ht 1.524 m (5')   Wt 62.5 kg (137 lb 12.6 oz)   SpO2 95%   Breastfeeding? No   BMI 26.91 kg/m²     Pt is AOX4. Tearful and anxious. Pt ambulated to the bathroom and stated, \"You guys are mean, why do you make me walk to the bathroom.\" Pt was educated on importance of ambulation. Pt states pain is 10/10- medicated per MAR. Midline in right upper arm- TKO. Pt has been NPO since 0000. POC discussed. Bed is locked in lowest position.   "

## 2019-04-04 NOTE — CARE PLAN
Problem: Pain Management  Goal: Pain level will decrease to patient's comfort goal  Outcome: PROGRESSING AS EXPECTED  Educated patient on pain management today. Instructed on medications that are available. Assessed pain using FLACC scale due to patient's current mentation.

## 2019-04-04 NOTE — CARE PLAN
Problem: Skin Integrity  Goal: Risk for impaired skin integrity will decrease  Outcome: PROGRESSING AS EXPECTED  Continue to turn Q2 hours, check for incontinence Q2/prn. Barrier cream used.

## 2019-04-05 LAB
ANION GAP SERPL CALC-SCNC: 8 MMOL/L (ref 0–11.9)
BACTERIA BLD CULT: NORMAL
BACTERIA BLD CULT: NORMAL
BASOPHILS # BLD AUTO: 0.2 % (ref 0–1.8)
BASOPHILS # BLD: 0.01 K/UL (ref 0–0.12)
BUN SERPL-MCNC: 19 MG/DL (ref 8–22)
CALCIUM SERPL-MCNC: 10.7 MG/DL (ref 8.5–10.5)
CHLORIDE SERPL-SCNC: 101 MMOL/L (ref 96–112)
CO2 SERPL-SCNC: 26 MMOL/L (ref 20–33)
CREAT SERPL-MCNC: 0.61 MG/DL (ref 0.5–1.4)
EOSINOPHIL # BLD AUTO: 0.16 K/UL (ref 0–0.51)
EOSINOPHIL NFR BLD: 3.4 % (ref 0–6.9)
ERYTHROCYTE [DISTWIDTH] IN BLOOD BY AUTOMATED COUNT: 51 FL (ref 35.9–50)
GLUCOSE SERPL-MCNC: 93 MG/DL (ref 65–99)
GRAM STN SPEC: NORMAL
HCT VFR BLD AUTO: 36.7 % (ref 37–47)
HGB BLD-MCNC: 11.4 G/DL (ref 12–16)
IMM GRANULOCYTES # BLD AUTO: 0.03 K/UL (ref 0–0.11)
IMM GRANULOCYTES NFR BLD AUTO: 0.6 % (ref 0–0.9)
LYMPHOCYTES # BLD AUTO: 0.8 K/UL (ref 1–4.8)
LYMPHOCYTES NFR BLD: 17.2 % (ref 22–41)
MCH RBC QN AUTO: 24.8 PG (ref 27–33)
MCHC RBC AUTO-ENTMCNC: 31.1 G/DL (ref 33.6–35)
MCV RBC AUTO: 79.8 FL (ref 81.4–97.8)
MONOCYTES # BLD AUTO: 0.76 K/UL (ref 0–0.85)
MONOCYTES NFR BLD AUTO: 16.4 % (ref 0–13.4)
NEUTROPHILS # BLD AUTO: 2.88 K/UL (ref 2–7.15)
NEUTROPHILS NFR BLD: 62.2 % (ref 44–72)
NRBC # BLD AUTO: 0 K/UL
NRBC BLD-RTO: 0 /100 WBC
PLATELET # BLD AUTO: 204 K/UL (ref 164–446)
PMV BLD AUTO: 11.1 FL (ref 9–12.9)
POTASSIUM SERPL-SCNC: 3.4 MMOL/L (ref 3.6–5.5)
RBC # BLD AUTO: 4.6 M/UL (ref 4.2–5.4)
SIGNIFICANT IND 70042: NORMAL
SITE SITE: NORMAL
SODIUM SERPL-SCNC: 135 MMOL/L (ref 135–145)
SOURCE SOURCE: NORMAL
VANCOMYCIN TROUGH SERPL-MCNC: 20.4 UG/ML (ref 10–20)
WBC # BLD AUTO: 4.6 K/UL (ref 4.8–10.8)

## 2019-04-05 PROCEDURE — 700102 HCHG RX REV CODE 250 W/ 637 OVERRIDE(OP): Performed by: INTERNAL MEDICINE

## 2019-04-05 PROCEDURE — 97530 THERAPEUTIC ACTIVITIES: CPT

## 2019-04-05 PROCEDURE — 700102 HCHG RX REV CODE 250 W/ 637 OVERRIDE(OP): Performed by: HOSPITALIST

## 2019-04-05 PROCEDURE — 99232 SBSQ HOSP IP/OBS MODERATE 35: CPT | Performed by: INTERNAL MEDICINE

## 2019-04-05 PROCEDURE — 700105 HCHG RX REV CODE 258: Performed by: INTERNAL MEDICINE

## 2019-04-05 PROCEDURE — 80202 ASSAY OF VANCOMYCIN: CPT

## 2019-04-05 PROCEDURE — 700111 HCHG RX REV CODE 636 W/ 250 OVERRIDE (IP): Performed by: HOSPITALIST

## 2019-04-05 PROCEDURE — 97116 GAIT TRAINING THERAPY: CPT

## 2019-04-05 PROCEDURE — 99233 SBSQ HOSP IP/OBS HIGH 50: CPT | Performed by: INTERNAL MEDICINE

## 2019-04-05 PROCEDURE — 700111 HCHG RX REV CODE 636 W/ 250 OVERRIDE (IP): Performed by: INTERNAL MEDICINE

## 2019-04-05 PROCEDURE — 80048 BASIC METABOLIC PNL TOTAL CA: CPT

## 2019-04-05 PROCEDURE — 36415 COLL VENOUS BLD VENIPUNCTURE: CPT

## 2019-04-05 PROCEDURE — 770004 HCHG ROOM/CARE - ONCOLOGY PRIVATE *

## 2019-04-05 PROCEDURE — 85025 COMPLETE CBC W/AUTO DIFF WBC: CPT

## 2019-04-05 PROCEDURE — A9270 NON-COVERED ITEM OR SERVICE: HCPCS | Performed by: HOSPITALIST

## 2019-04-05 PROCEDURE — A9270 NON-COVERED ITEM OR SERVICE: HCPCS | Performed by: INTERNAL MEDICINE

## 2019-04-05 RX ADMIN — GABAPENTIN 300 MG: 300 CAPSULE ORAL at 05:51

## 2019-04-05 RX ADMIN — NYSTATIN: 100000 POWDER TOPICAL at 18:26

## 2019-04-05 RX ADMIN — OXYCODONE HYDROCHLORIDE 2.5 MG: 5 TABLET ORAL at 09:09

## 2019-04-05 RX ADMIN — NYSTATIN: 100000 POWDER TOPICAL at 05:50

## 2019-04-05 RX ADMIN — OXYCODONE HYDROCHLORIDE 2.5 MG: 5 TABLET ORAL at 16:34

## 2019-04-05 RX ADMIN — FUROSEMIDE 40 MG: 40 TABLET ORAL at 05:51

## 2019-04-05 RX ADMIN — LOSARTAN POTASSIUM 50 MG: 50 TABLET ORAL at 05:50

## 2019-04-05 RX ADMIN — POTASSIUM CHLORIDE 40 MEQ: 1500 TABLET, EXTENDED RELEASE ORAL at 05:51

## 2019-04-05 RX ADMIN — POTASSIUM CHLORIDE 40 MEQ: 1500 TABLET, EXTENDED RELEASE ORAL at 18:18

## 2019-04-05 RX ADMIN — LABETALOL HCL 100 MG: 200 TABLET, FILM COATED ORAL at 18:51

## 2019-04-05 RX ADMIN — MEROPENEM 2 G: 1 INJECTION, POWDER, FOR SOLUTION INTRAVENOUS at 21:12

## 2019-04-05 RX ADMIN — MORPHINE SULFATE 2 MG: 4 INJECTION INTRAVENOUS at 14:28

## 2019-04-05 RX ADMIN — OXYCODONE HYDROCHLORIDE 2.5 MG: 5 TABLET ORAL at 03:26

## 2019-04-05 RX ADMIN — GABAPENTIN 300 MG: 300 CAPSULE ORAL at 18:18

## 2019-04-05 RX ADMIN — MEROPENEM 2 G: 1 INJECTION, POWDER, FOR SOLUTION INTRAVENOUS at 09:10

## 2019-04-05 RX ADMIN — OXYCODONE HYDROCHLORIDE 2.5 MG: 5 TABLET ORAL at 13:29

## 2019-04-05 RX ADMIN — OMEPRAZOLE 20 MG: 20 CAPSULE, DELAYED RELEASE ORAL at 05:51

## 2019-04-05 RX ADMIN — TRAMADOL HYDROCHLORIDE 50 MG: 50 TABLET, COATED ORAL at 18:51

## 2019-04-05 RX ADMIN — VANCOMYCIN HYDROCHLORIDE 700 MG: 100 INJECTION, POWDER, LYOPHILIZED, FOR SOLUTION INTRAVENOUS at 18:18

## 2019-04-05 ASSESSMENT — ENCOUNTER SYMPTOMS
NAUSEA: 0
WHEEZING: 0
SORE THROAT: 0
COUGH: 0
HEADACHES: 0
FEVER: 0
DIARRHEA: 0
ABDOMINAL PAIN: 0
SHORTNESS OF BREATH: 0
PALPITATIONS: 0
WEAKNESS: 1
VOMITING: 0
STRIDOR: 0
CHILLS: 0
DIZZINESS: 0

## 2019-04-05 ASSESSMENT — COGNITIVE AND FUNCTIONAL STATUS - GENERAL
SUGGESTED CMS G CODE MODIFIER MOBILITY: CM
STANDING UP FROM CHAIR USING ARMS: A LOT
MOVING TO AND FROM BED TO CHAIR: UNABLE
MOBILITY SCORE: 8
TURNING FROM BACK TO SIDE WHILE IN FLAT BAD: UNABLE
WALKING IN HOSPITAL ROOM: A LOT
MOVING FROM LYING ON BACK TO SITTING ON SIDE OF FLAT BED: UNABLE
CLIMB 3 TO 5 STEPS WITH RAILING: TOTAL

## 2019-04-05 ASSESSMENT — GAIT ASSESSMENTS
DISTANCE (FEET): 5
GAIT LEVEL OF ASSIST: MODERATE ASSIST
ASSISTIVE DEVICE: FRONT WHEEL WALKER
DEVIATION: STEP TO;DECREASED BASE OF SUPPORT;BRADYKINETIC;SHUFFLED GAIT;DECREASED HEEL STRIKE;DECREASED TOE OFF

## 2019-04-05 NOTE — PROGRESS NOTES
Pharmacy Kinetics 68 y.o. female on vancomycin day # 11 2019    Currently on Vancomycin 700 mg iv q24hr    Indication for Treatment: empiric for possible CNS infection    Pertinent history per medical record: Admitted on 3/8/2019 for iliopsoas mass and multiple brain lesions.  There is concern that this is infectious.  All cultures are negative to date, but were drawn while on pip/tazo. CHAS was negative.  Patient has a history of breast cancer and DM.  Wound was drained on 3/29 showing purulent discharge (cultures remain negative).  ID is following.  Plan for completion of abx in house prior to transfer to [facility] in [place].    Other antibiotics: Meropenem 2 g IV q12h    Allergies: Patient has no known allergies.     List concerns for renal function: BUN:SCr > 20:1, advanced age    Pertinent cultures to date:   3/11/19 - wound (R gluteal muscle) x 2: Negative  3/12/19 - blood (peripheral) x 2: Negative  3/29/19 - wound (R buttock): x 2: NGTD  3/31/19 - blood (peripheral) x 2: No growth  19 - retroperitoneal drain aspirate pending (no organisms seen)     MRSA nares swab if pneumonia is a concern (ordered/positive/negative/n-a): N/A    Recent Labs      19   0013  19   0022  19   0022   WBC  3.0*  3.5*  4.6*   NEUTSPOLYS  77.80*  47.50  62.20     Recent Labs      19   0013  19   0022  19   0022   BUN  24*  23*  19   CREATININE  0.88  0.84  0.61       /90   Pulse 94   Temp 37.1 °C (98.7 °F) (Temporal)   Resp 20   Ht 1.524 m (5')   Wt 62.5 kg (137 lb 12.6 oz)   SpO2 100%  Temp (24hrs), Av.9 °C (98.4 °F), Min:36.2 °C (97.2 °F), Max:37.6 °C (99.6 °F)      A/P   1. Vancomycin dose change: not indicated  2. Next vancomycin level: 19 prior to dose (followup tomorrow 19)  3. Goal trough: 12-16 mcg/mL  1. Comments: No further growth on cultures; fluid drained again in IR on ; ID following, anticipate 4-6 weeks IV abx. Continue current dose, SCr has trended  down a bit, so will conservatively order trough for today prior to dose. Pharmacist will follow up on level tomorrow.

## 2019-04-05 NOTE — PROGRESS NOTES
Pharmacy Kinetics 68 y.o. female on vancomycin day # 10 2019    Currently on Vancomycin 700 mg iv q24hr     Indication for Treatment:  empiric for possible CNS infection     Pertinent history per medical record: Admitted on 3/8/2019 for iliopsoas mass and multiple brain lesions.  There is concern that this is infectious.  All cultures are negative to date, but were drawn while on pip/tazo. CHAS was negative.  Patient has a history of breast cancer and DM.  Wound was drained on 3/29 showing purulent discharge (cultures remain negative).  ID is following.  Plan for completion of abx in house prior to transfer to [facility] in [place].     Other antibiotics: Meropenem 2 g IV q12h     Allergies: Patient has no known allergies.      List concerns for renal function: BUN:SCr > 20:1, advanced age     Pertinent cultures to date:   3/11/19 - wound (R gluteal muscle) x 2: Negative  3/12/19 - blood (peripheral) x 2: Negative  3/29/19 - wound (R buttock): x 2: NGTD     MRSA nares swab if pneumonia is a concern (ordered/positive/negative/n-a): N/A    Recent Labs      19   0014  19   0013  19   0022   WBC  3.3*  3.0*  3.5*   NEUTSPOLYS  68.90  77.80*  47.50     Recent Labs      19   0014  19   0013  19   0022   BUN  20  24*  23*   CREATININE  0.76  0.88  0.84     No results for input(s): VANCOTROUGH, VANCOPEAK, VANCORANDOM in the last 72 hours.  Intake/Output Summary (Last 24 hours) at 19 [id number]  Last data filed at 19 [id number]   Gross per 24 hour   Intake              980 ml   Output              100 ml   Net              880 ml      BP (!) 168/106   Pulse (!) 130   Temp 36.2 °C (97.2 °F) (Oral)   Resp (!) 24   Ht 1.524 m (5')   Wt 62.5 kg (137 lb 12.6 oz)   SpO2 93%  Temp (24hrs), Av.9 °C (98.4 °F), Min:36.2 °C (97.2 °F), Max:37.6 °C (99.6 °F)      A/P   1. Vancomycin dose change: not indicated  2. Next vancomycin level: ~2 days  3. Goal trough: 12-16 mcg/mL  4. Comments: Renal  function appears stable. ID following, anticipate 4-6 weeks IV abx. Continue current dose, CTM.    Trinity Pearce, PharmD, BCOP

## 2019-04-05 NOTE — PROGRESS NOTES
"[facility] Medicine Daily Progress Note    Date of Service  4/5/2019    Chief Complaint  68 y.o. female admitted 3/8/2019 with right hip pain, fever and abnormal brain mri.    Hospital Course    Patient started on empiric antibiotics given fever.  She had abnormal findings on MRI brain and CT showed \"lesion\" on right iliopsoas.  This lesion was biopsied and turned out to be abscess.  She has history of breast cancer and there is also concern of brain lesions being metastatic though their appearance is not typical of this.      Interval Problem Update  3/12 Discussed with Dr Turner for second opinion of brain lesions and based on appearance not able to r/o or r/i any diagnosis.  Given patient's presentation of confusion, hip pain and fever  - this is more supportive of infectious etiology rather than malignant.  Fluid from right gluteal area sent to micro and as of yet - no growth.  Continue zosyn for now.  3/13 Patient with slight improvement in mentation.  Blood cultures and abscess cultures are showing no growth at this time.  TTE being done while I examined patient - no evidence of vegetations on this test.  ID consultation pending - d/w Dr Garcia.  3/14 Patient feeling well today, her pain is present but not as bad as prior to admission.  She was able to follow the conversation today and is agreeable to move forward with the CHAS tomorrow.  I spoke with her brother, René, and updated him on condition yesterday afternoon also.  Will also have PICC placed in next few days as long as blood cultures remain negative as I expect a prolonged antibiotic course of treatment.  3/15 Patient without new complaints, states she needs help to move in bed.  CHAS showed no evidence of vegetations and regular diet resumed.  Culture remains negative and biopsy of brain lesion may prove needed - will watch through the weekend and if mentation does not continue to improve, will discuss with neurosurgery on Monday.  3/16 Patient states she " is okay today, mentation has waxed and waned without significant improvement.  She was febrile earlier today.  No other acute events.  3/17 Patient with significant improvement in mentation today, she is aware of her hospitalization, not falling asleep mid sentence.  Her pain mediations were held most of yesterday and likely far too strong for patient and were affecting her awareness.  Oxycodone 5 discontinued and will use tramadol instead unless pain not well controlled.  CT brain with contrast ordered as a quick scan for comparison to MRI.    3/18 Patient feeling same today, discussed need to discuss with neurosurgery for possible biopsy.  Discussed with Dr Nguyen, he reviewed MRI and CT brain and he feels they are likely metastatic lesions but are far too small to biopsy.  His recommendation is to continue with antibiotics and re-image in 2 weeks to see if any change that would be amenable to biopsy or that would further declare infection.    3/26 Patient mental status continues to wax and wane.  ID ordered MRI lumbar, pelvis and sacrum for evaluation for source of infection, patient with continued low back pain and no real improvement since I saw her 7 days prior with continued antibiotics during this time.  Cultures have not growth pathogen, repeat MRI 3/28.   3/27 Patient somnolent most of the day today, MRI's completed and showing 2 areas of fluid collections, given her history are likely abscesses and will require drainage, CT guided drainage requested and patient NPO at [facility] for this.  D/w ID - cultures will be done on fluid collection.  Repeat CT brain ordered for tomorrow.  3/28 Patient up to chair, diet resumed as lovenox given this am and drainage of abscesses deferred until tomorrow.  Vanco/merrem to continue and cultures will be collected from abscesses.  3/29 Patient went to IR today, had drain placed in the right buttock into abscess cavity and fluid sent for culture.  Potassium is slightly low,  continue with PO replacement.  HR has improved from yesterday but patient PO intake still not at normal level, increase IVF rate to 100cc/hour.  3/30 Patient without fever since drain placed right buttock.  Purulent material in ÁNGEL bulb. Cultures thus far are still showing no growth.  Antibiotics to continue.  CT head ordered to evaluate change in past 2 weeks as recommended by neurosurgery.  3/31 Patient with fevers overnight - was altered during this time but has recovered.  She denies pain when examined by me.  She still has purulent material in the drainage tubing.  Will continue with current IV antibiotics - 6 weeks total antibiotics suspected - end date would be 4/24/19.  D/w ID.  4/1 Patient remains intermittently somnolent.  She wakes easily but falls back to sleep quickly.  She remained afebrile overnight.  ÁNGEL drain continues to drain purulent material.  ABX through 4/24 - once accepting facility found, patient okay to transfer with midline intact for completion of antibiotics.  4/2 patient up in chair, per RN, required minimal assist for transfer  Improving strength  Minimal output from ÁNGEL drain    4/4 increasing confusion today, moving all extremities spontaneously  Given morphine earlier in the morning, may contribute to encephalopathy  No clear signs of infection  Currently on antibiotics    4/5 improving confusion, following some commands  Oriented x2  Status post ÁNGEL drain placed    Consultants/Specialty  ID - santino/Alex  Orthopedic surgery    Code Status  full    Disposition  Likely needs long-term IV antibiotics  Concern for iliac osteomyelitis, we will follow-up cultures and IR drain today  Has a midline, will likely need to change to PICC line for antibiotics, we will follow-up with ID for recommendations    Follow-up ammonia level      Review of Systems  Review of Systems   Unable to perform ROS: Mental status change        Physical Exam  Temp:  [36.2 °C (97.2 °F)-37.3 °C (99.1 °F)] 36.7 °C  (98.1 °F)  Pulse:  [] 75  Resp:  [18-26] 20  BP: (136-173)/() 144/71  SpO2:  [91 %-100 %] 92 %    Physical Exam   Constitutional: No distress.   HENT:   Head: Normocephalic and atraumatic.   Mouth/Throat: No oropharyngeal exudate.   Eyes: Conjunctivae are normal.   Neck: Neck supple.   Cardiovascular: Normal rate and regular rhythm.    Pulmonary/Chest: Effort normal and breath sounds normal. No respiratory distress.   Abdominal: Soft. Bowel sounds are normal. She exhibits no distension.   Musculoskeletal: She exhibits no edema or tenderness.   Neurological: She is alert. No cranial nerve deficit. She exhibits normal muscle tone.   Oriented x2  Following some commands     Skin: Skin is warm and dry. She is not diaphoretic. No erythema.   Nursing note and vitals reviewed.      Fluids    Intake/Output Summary (Last 24 hours) at 04/05/19 1358  Last data filed at 04/05/19 0910   Gross per 24 hour   Intake              420 ml   Output               20 ml   Net              400 ml       Laboratory  Recent Labs      04/03/19   0013  04/04/19   0022  04/05/19   0022   WBC  3.0*  3.5*  4.6*   RBC  4.61  5.48*  4.60   HEMOGLOBIN  11.6*  13.9  11.4*   HEMATOCRIT  36.9*  43.7  36.7*   MCV  80.0*  79.7*  79.8*   MCH  25.2*  25.4*  24.8*   MCHC  31.4*  31.8*  31.1*   RDW  50.6*  50.7*  51.0*   PLATELETCT  114*  157*  204   MPV   --   11.4  11.1     Recent Labs      04/03/19   0013  04/04/19   0022  04/05/19   0022   SODIUM  131*  133*  135   POTASSIUM  4.2  4.7  3.4*   CHLORIDE  98  100  101   CO2  24  21  26   GLUCOSE  99  97  93   BUN  24*  23*  19   CREATININE  0.88  0.84  0.61   CALCIUM  10.4  11.5*  10.7*                   Imaging  CT-DRAIN-HEMATOMA - SEROMA   Final Result      1.  CT-GUIDED RETROPERITONEAL (EXTRAPERITONEAL) RIGHT PELVIC ABSCESS DRAINAGE AT THE RIGHT ILIOPSOAS. ORDERS WERE WRITTEN FOR ONCE DAILY IRRIGATION OF THE CATHETER WITH 5 ML STERILE SALINE.      2.  THE CURRENT PLAN IS TO MONITOR DRAINAGE  OUTPUT AND OBTAIN A FOLLOWUP CT SCAN IN 5-7 DAYS IF CLINICALLY INDICATED.      CT-ABDOMEN-PELVIS WITH   Final Result      1.  Rim-enhancing fluid collection in the right iliopsoas muscle may have increased in size slightly from the prior exam.      2.  Pigtail catheter in the right gluteus medius muscle. No residual fluid collection is appreciated.      3.  Pathologic fracture of the right ilium. Status post right SI joint fusion.      4.  Atherosclerosis.      5.  Cholelithiasis.      CT-HEAD WITH & W/O   Final Result      1.  Interval decrease in size and level of enhancement of multiple small punctate foci in both cerebral hemispheres and in the midbrain consistent with improving septic emboli.      2.  No hemorrhage or mass effect.      CT-DRAIN-RETROPERITONEAL   Final Result      1.  CT GUIDED CATHETER DRAINAGE OF RIGHT GLUTEAL ABSCESS.   2.  THE CURRENT PLAN IS TO OBTAIN A FOLLOW-UP CT SCAN IN 5-7 DAYS IF INDICATED. ORDERS WERE WRITTEN FOR ONCE DAILY IRRIGATION OF THE CATHETER WITH 5 ML STERILE SALINE FOR 3 DAYS.   3. THE ILIOPSOAS COLLECTION WAS NOT AMENABLE TO CATHETER DRAINAGE AS INTERVENING BOWEL AND/OR BONY STRUCTURES OBSTRUCT A PATHWAY TO THIS COLLECTION AT THIS TIME.      MR-LUMBAR SPINE-WITH & W/O   Final Result      1.  There are multifocal enhancing fluid collections in the right iliopsoas muscles and gluteus muscles adjacent to the right ilium. Right iliopsoas fluid collection measures an approximately 3.9 x 2.9 cm. The right gluteal fluid collection measures an    approximately 3.5 x 2.8 cm in size. The differential diagnosis includes postsurgical seroma and abscess.   2.  Post operative and degenerative changes as described above.      MR-PELVIS-WITH & W/O AND SEQUENCES   Final Result      1.  Surgical screw traverses both sacroiliac joint across presumed pathologic fracture of the right sacrum and the hardware obscures the adjacent tissues.      2.  No other evidence for bony metastatic disease       3.  Right gluteal musculature rim-enhancing fluid collection measuring 3.4 x 2.8 cm. This could be a postoperative seroma versus abscess      4.  osteoarthritis of both hip joint      5.  Prior hysterectomy      IR-MIDLINE CATHETER INSERTION WO GUIDANCE > AGE 3   Final Result                  Ultrasound-guided midline placement performed by qualified nursing staff    as above.          CT-HEAD WITH   Final Result      Multiple subcentimeter too numerous to count enhancing masses scattered throughout the supratentorial and infratentorial brain. These demonstrate some surrounding vasogenic edema and most likely represent multifocal metastatic lesions. Other    considerations would include multifocal abscesses or septic emboli.      EC-CHAS W/O CONT   Final Result      EC-CHAS W/O CONT   Final Result      CT-NEEDLE BX-MUSCLE RIGHT   Final Result      CT-guided aspiration of pus like material in the right gluteal muscle lesion.      EC-ECHOCARDIOGRAM COMPLETE W/O CONT   Final Result      OUTSIDE IMAGES-CT CHEST/ABDOMEN/PELVIS   Final Result      OUTSIDE IMAGES-DX CHEST   Final Result      RS-DSYSPCS-NTOCYFP FILM X-RAY   Final Result      OUTSIDE IMAGES-MR BRAIN   Final Result      OUTSIDE IMAGES-MR BRAIN   Final Result           Assessment/Plan  * Lesion of brain   Assessment & Plan    Metastatic cancer vs infectious etiology  D/w Dr Black for questionable brain mets, recommending IR Bx of psoas muscle   IR Bx of psoas muscle ordered - was abscess  Empiric treatment of infection  TTE and CHAS negative for vegetations.  D/w Dr Nguyen - lesions likely metastatic based on his interpretation of MRI/CT - too small to biopsy, recommends continuing antibiotics and re-imaging in 2 weeks to see if there has been any change.  CT brain to be repeated - lesions shrinking and decreased in overall number - c/w septic emboli       Encephalopathy   Assessment & Plan    Likely toxic metabolic from infection  Waxes and wanes    4/3  resolving  Encourage activity, ambulating with standby assist and front wheel walker    Will need skilled nursing facility placement and, [place],  assisting    4/4 recurrent  No clear signs of infection  Administered morphine for pain  Limit narcotics  No focal deficits    4/5 slightly improving encephalopathy  Ongoing infection  Continue antibiotics         Mass of iliopsoas muscle group   Assessment & Plan    Return of fluid collection, repeat drainage in IR with cultures, drain placed 3/29 - 10ml purulent material drained and sent for culture - no growth a/o 3/30  Was on linezolid and cefepime and now on vanco/merrem - end date to be 6 weeks of treatment. (4/24/19)  ID consulting - d/w Dr Johnson    4/2 repeat imaging, evaluate for fluid collection next line minimal output from ÁNGEL    4/3 iliopsoas abscess, slightly increasing in size, discussed with IR, to drain today  N.p.o.  Discontinue drainage for gluteal abscess, resolved  Discussed with ID  Continue antibiotics    PX surgery, Dr. Porter consulted, concern for likely osteomyelitis  We will follow-up cultures, will need long-term IV antibiotics    4/4 s/p transsacral fixation, orthopedic surgery following  ÁNGEL drain placement per IR    4/5 that is post drainage, IR placement  Continue antibiotics           Normocytic anemia   Assessment & Plan    stable     RLS (restless legs syndrome)- (present on admission)   Assessment & Plan    Pramipexole       Diabetes mellitus type 2 in obese (HCC)- (present on admission)   Assessment & Plan    Well controlled   Short acting insulin as needed   Accu-Checks, hypoglycemia protocol          History of breast cancer- (present on admission)   Assessment & Plan    Mets vs infection to brain  No known active breast cancer.    4/2 improving lesions on CT with improving encephalopathy  Continue antibiotics per ID     COPD (chronic obstructive pulmonary disease) (HCC)- (present on admission)   Assessment &  Plan    Oxygen as needed, Respiratory protocol, Bronchodilators, Incentive spirometry      Urinary tract infection   Assessment & Plan    Culture remain no growth.       Fungal infection of the groin   Assessment & Plan    on nystatin powder 3/24     History of deep vein thrombosis- (present on admission)   Assessment & Plan    History of deep vein thrombosis   Was on Rivaroxaban as outpatient.     Held for Bx Mar 11  Restart AC after completing abx treatment         Essential hypertension- (present on admission)   Assessment & Plan    Restarted on losartan and furosemide with hold parameters.     GERD (gastroesophageal reflux disease)- (present on admission)   Assessment & Plan    Omeprazole     Chronic pain- (present on admission)   Assessment & Plan     Multifactorial  Likely secondary to abscess, fracture  Pain control            VTE prophylaxis: scds

## 2019-04-05 NOTE — CARE PLAN
Problem: Mobility  Goal: Risk for activity intolerance will decrease  Outcome: PROGRESSING SLOWER THAN EXPECTED  Pt up to chair for meals. Assistance x2 when ambulating with walker. Takes a lot of encouragement to get out of bed. Encouraging independence.     Problem: Psychosocial Needs:  Goal: Level of anxiety will decrease  Outcome: PROGRESSING SLOWER THAN EXPECTED  Pt very anxious throughout the morning. Pain medication given for hip pain. Attempted to work with physical therapy and continued to cry and moan throughout. Pt moaning and yelling while in bed. Reassurance and reorientation given.

## 2019-04-05 NOTE — PROGRESS NOTES
Infectious Disease Progress Note    Author: Beatriz Johnson M.D. Date & Time of service: 4/5/2019  12:54 PM    Chief Complaint:  Multiple brain lesions  Iliopsoas lesion    Interval History:  68-year-old female with history of diabetes and breast cancer, admitted 3/8/2019 with abdominal pain, iliopsoas lesion, and an abnormal MRI with multiple brain lesions.  3/14 AF (99.2) WBC 5.8 more awake today as compared to yesterday but not oriented-denies pain, following commands. ECHO reviewed  3/15 AF (99.1) no WBC today Had CHAS today  3/16 temp 100.5 WBC 7.9 more confused today-naked but pushing off sheets  3/17 AF WBC not done Less obtunded today-not oriented. Denies pain, N/V, HA, visual changes etc  3/18 afebrile WBC 6 patient sitting up in bed eating breakfast, she is quite soft spoken.  She denies any headache, nausea or vomiting.  Endorses a mild cough.  3/19 AF pt denies any new symptoms, no HA. Brain lesions too small for biopsy per notes. Neurosurgery recommends repeating scan in 2 weeks.  3/20 afebrile patient is sleeping and difficult to arouse  3/21 patient is sitting up in bed and is alert and awake.  She however appears to have some waxing confusion but no she is in the hospital in [place].  Complains of lower back pain but no headache, nausea or vomiting  3/22 AF no CBC, continues to have bowel incontinence but not watery per RN, pt has no new complaints today, not engaged in conversation  3/23 afebrile WBC 6.3 patient complaining of stool incontinence and diarrhea, midline placed yesterday  3/24 afebrile patient rested comfortably without any new complaints  3/25 T- max 98.5, no new labs since 03/23. Reports mild HA and weakness. Emotional when asked about hx of hip fx.  3/26 afebrile, wbc 8.0. Reports feeling agitated. Per RN appeared confused throughout the night  3/27 no change overnight. RN reports agitation overnight. Pt sleeping, difficult to wake this am  3/28 reports slight improvement from  yesterday. Moving LE easier after abscess drained by primary team.   3/29/2019 no fevers.  WBC is down to 3.4.  Underwent drainage from the right gluteal area.  It was purulent.  3/30/2019-no fevers.  Remains slightly confused.  WBC is 3.9  3/31/2019 had fevers up to 102.6.  Complains of some right hand weakness.  No new issues overnight.  Family at bedside.  2019 no fevers.  Easily arousable.  ÁNGEL continues to drain.  2019-no fevers.  Awake and responsive but feels weak.  There is minimal drainage from the ÁNGEL.  As per the nursing the patient is incontinent of stool.  But when you talk to her she states she is just is not able to get up on time.  4/3/2019-complains of some weakness in the hand. No fevers. The drain is not draining much  - no fevers. She is anxious since she has been NPO. Awaiting IR drain   no fevers.  Got her drain.  There was purulent fluid.  WBCs 4.6.  Labs Reviewed    Review of Systems:  Review of Systems   Constitutional: Negative for chills and fever.   HENT: Negative for sore throat.    Respiratory: Negative for cough, shortness of breath, wheezing and stridor.    Cardiovascular: Negative for chest pain and palpitations.   Gastrointestinal: Negative for abdominal pain, diarrhea, nausea and vomiting.   Genitourinary: Negative for dysuria.   Musculoskeletal:        Improving   Skin: Negative for itching and rash.   Neurological: Positive for weakness. Negative for dizziness and headaches.        Says she is having difficulty finding words and complains of some right-sided hand weakness   Limited review of systems secondary to intermittent confusion    Hemodynamics:  Temp (24hrs), Av.7 °C (98.1 °F), Min:36.2 °C (97.2 °F), Max:37.3 °C (99.1 °F)  Temperature: 36.7 °C (98.1 °F)  Pulse  Av.7  Min: 70  Max: 153  Blood Pressure : 144/71       Physical Exam:  Physical Exam   Constitutional: She is oriented to person, place, and time. No distress.   HENT:   Head: Normocephalic and  atraumatic.   Mouth/Throat: Oropharynx is clear and moist. No oropharyngeal exudate.   Eyes: Pupils are equal, round, and reactive to light. EOM are normal. Right eye exhibits no discharge. Left eye exhibits no discharge.   Neck: Neck supple. No JVD present.   Cardiovascular: Normal rate and intact distal pulses.    Murmur heard.  Pulmonary/Chest: Effort normal. No respiratory distress. She has no wheezes.   Abdominal: Soft. Bowel sounds are normal. She exhibits no distension. There is no tenderness.   Musculoskeletal: She exhibits no tenderness or deformity.   Lymphadenopathy:     She has no cervical adenopathy.   Neurological: She is alert and oriented to person, place, and time.   Moving all extremities  Answering appropriately   Skin: Skin is warm. No rash noted. She is not diaphoretic.   Well-healed left breast mastectomy scar   Nursing note and vitals reviewed.      Meds:    Current Facility-Administered Medications:   •  furosemide  •  potassium chloride SA  •  loperamide  •  vancomycin  •  meropenem  •  MD Alert...Vancomycin per Pharmacy  •  nystatin  •  labetalol  •  ibuprofen  •  haloperidol lactate  •  pramipexole  •  tramadol  •  losartan  •  gabapentin  •  omeprazole  •  Respiratory Care per Protocol  •  acetaminophen  •  Notify provider if pain remains uncontrolled **AND** Use the numeric rating scale (NRS-11) on regular floors and Critical-Care Pain Observation Tool (CPOT) on ICUs/Trauma to assess pain **AND** Pulse Ox (Oximetry) **AND** Pharmacy Consult Request **AND** If patient difficult to arouse and/or has respiratory depression, stop any opiates that are currently infusing and call a Rapid Response. **AND** oxyCODONE immediate-release **AND** [DISCONTINUED] oxyCODONE immediate-release **AND** morphine injection  •  ondansetron  •  ondansetron    Labs:  Recent Labs      04/03/19   0013  04/04/19   0022  04/05/19   0022   WBC  3.0*  3.5*  4.6*   RBC  4.61  5.48*  4.60   HEMOGLOBIN  11.6*  13.9   11.4*   HEMATOCRIT  36.9*  43.7  36.7*   MCV  80.0*  79.7*  79.8*   MCH  25.2*  25.4*  24.8*   RDW  50.6*  50.7*  51.0*   PLATELETCT  114*  157*  204   MPV   --   11.4  11.1   NEUTSPOLYS  77.80*  47.50  62.20   LYMPHOCYTES  8.60*  22.80  17.20*   MONOCYTES  8.60  12.30  16.40*   EOSINOPHILS  3.60  15.70*  3.40   BASOPHILS  0.70  0.60  0.20   RBCMORPHOLO  #CRI   --    --      Recent Labs      04/03/19   0013  04/04/19   0022  04/05/19   0022   SODIUM  131*  133*  135   POTASSIUM  4.2  4.7  3.4*   CHLORIDE  98  100  101   CO2  24  21  26   GLUCOSE  99  97  93   BUN  24*  23*  19     Recent Labs      04/03/19   0013  04/04/19   0022  04/05/19   0022   CREATININE  0.88  0.84  0.61       Imaging:  Ct-needle Bx-muscle Right    Result Date: 3/13/2019  3/11/2019 4:44 PM HISTORY/REASON FOR EXAM:  Right gluteal hypodense lesion. Moderate sedation was administered with continuous monitoring of the patient under the direct supervision of  Medications: 2 mg of Versed and 200 mcg of fentanyl Total sedation time 60 minutes Procedure: After the informed consent the patient was placed on the CT table on prone position. Localization CT scan was obtained. It demonstrates hypodense area in the right gluteus muscle. Subsequently a 17-gauge needle was advanced into the lesion. 20  mL of pus was aspirated. The materials were sent to the pathology. The patient tolerated the procedure without any immediate competition.     CT-guided aspiration of pus like material in the right gluteal muscle lesion.    Ec-echocardiogram Complete W/o Cont    Result Date: 3/13/2019  Transthoracic Echo Report Echocardiography Laboratory CONCLUSIONS No prior study is available for comparison. Normal left ventricular systolic function. Left ventricular ejection fraction is visually estimated to be 65%. Normal diastolic function. Normal inferior vena cava size and inspiratory collapse. Aortic sclerosis without stenosis. Estimated right ventricular  systolic pressure  is 33 mmHg. No obvious valvular vegetations are noted on a decent quality study.  If further valvular assessment is clinically indicated, consider transesophageal echocardiogram. SAM,  LV EF:  65    % FINDINGS Left Ventricle Normal left ventricular size and systolic function. Normal left ventricular wall thickness. Left ventricular ejection fraction is visually estimated to be 65%. Normal diastolic function. Right Ventricle Normal right ventricular size and systolic function. Right Atrium Normal right atrial size. Normal inferior vena cava size and inspiratory collapse. Left Atrium Normal left atrial size. Mitral Valve Structurally normal mitral valve without significant stenosis or regurgitation. Aortic Valve Aortic sclerosis without stenosis. No aortic insufficiency. Tricuspid Valve Structurally normal tricuspid valve. Mild tricuspid regurgitation. Right atrial pressure is estimated to be 3 mmHg. Estimated right ventricular systolic pressure  is 33 mmHg. Pulmonic Valve The pulmonic valve is not well visualized. No pulmonic insufficiency. Pericardium Normal pericardium without effusion. Aorta The aortic root is normal.  Ascending aorta diameter is 3.0 cm. Claire Tripathi MD (Electronically Signed) Final Date:     13 March 2019                 14:38      Micro:  Results     Procedure Component Value Units Date/Time    GRAM STAIN [[id number]] Collected:  04/04/19 1931    Order Status:  Completed Specimen:  Wound Updated:  04/05/19 0832     Significant Indicator .     Source WND     Site Retroperitoneal Drain Aspirate     Gram Stain Result Many WBCs.  No organisms seen.      CULTURE WOUND W/ GRAM STAIN [[id number]] Collected:  04/04/19 1931    Order Status:  Completed Specimen:  Other Updated:  04/04/19 2030    FLUID CULTURE W/GRAM STAIN [[id number]]     Order Status:  No result Specimen:  Body Fluid from Peritoneal Fluid     BLOOD CULTURE [[id number]] Collected:  03/31/19 1504    Order Status:   "Completed Specimen:  Blood from Peripheral Updated:  04/01/19 0854     Significant Indicator NEG     Source BLD     Site PERIPHERAL     Blood Culture No Growth    Note: Blood cultures are incubated for 5 days and  are monitored continuously.Positive blood cultures  are called to the RN and reported as soon as  they are identified.      Narrative:       Per Hospital Policy: Only change Specimen Src: to \"Line\" if  specified by physician order.    BLOOD CULTURE [[id number]] Collected:  03/31/19 1504    Order Status:  Completed Specimen:  Blood from Peripheral Updated:  04/01/19 0854     Significant Indicator NEG     Source BLD     Site PERIPHERAL     Blood Culture No Growth    Note: Blood cultures are incubated for 5 days and  are monitored continuously.Positive blood cultures  are called to the RN and reported as soon as  they are identified.      Narrative:       Per Hospital Policy: Only change Specimen Src: to \"Line\" if  specified by physician order.    CULTURE WOUND W/ GRAM STAIN [[id number]] Collected:  03/29/19 1030    Order Status:  Completed Specimen:  Wound Updated:  04/01/19 0841     Significant Indicator NEG     Source WND     Site Right Buttock     Culture Result Wound No growth at 72 hours     Gram Stain Result Moderate WBCs.  No organisms seen.      Narrative:       Collected By:[id number] JUDITH PICKENS  RIGHT Gluteal fluid aspirate x1 ml, collected by Dr. Persaud,  ordered by Dr. Keller, for gram stain and C&S.    GRAM STAIN [[id number]] Collected:  03/29/19 1030    Order Status:  Completed Specimen:  Wound Updated:  03/29/19 1401     Significant Indicator .     Source WND     Site Right Buttock     Gram Stain Result Moderate WBCs.  No organisms seen.      Narrative:       Collected By:[id number] JUDITH PICKENS  RIGHT Gluteal fluid aspirate x1 ml, collected by Dr. Persaud,  ordered by Dr. Keller, for gram stain and C&S.          Assessment:  Active Hospital Problems    Diagnosis   • *Lesion of brain [G93.9]   • Mass of " iliopsoas muscle group [M62.89]   • Encephalopathy [G93.40]   • Hyponatremia [E87.1]   • History of breast cancer [Z85.3]   • Diabetes mellitus type 2 in obese (HCC) [E11.69, E66.9]       Plan:  Multiple brain lesions-likely infectious  Low-grade fevers resolved  Wbc 4.5  Confusion resolving  MRI with scattered small lesions incl aaron, too small to biopsy per neurosurgery  TTE neg; CHAS neg  Bcxs neg to date  Repeat CT scan on 3/30/2019 shows decrease in the size of the abscesses  Repeat MRI prior to completion of abx  Discussed with the patient regarding calling the staff when she has to go to the bathroom.  Commode will be placed at her bedside.    Iliopsoas abscess, on treatment  CT + 2.6 cm lesion in the right iliopsoas region  S/p aspiration +WBCs-cultures negative to date  Bcxs remain neg  D/c'd Vanco secondary to elevated Vanco troughs 03/23 and patient not clearing well, restarted 03/25  D/c cefepime 03/26 - may be contributing to confusion  Tolerating Meropenem started 03/26,  Midline pending  MRI w/ contrast lumbar, pelvis 03/27 - Iliopsoas and gluteal fluid collections noted.    Drain placed 3/29/2019  Cultures are negative so far  Aim for at least 4-6 weeks of antibiotics  Repeat CT scan shows resolution of the abscess in the gluteus but increase in the iliopsoas  Another drain was placed on 4/4/2019 and it shows purulent fluid  Aim  for at least 4-6 weeks of IV antibiotics  Consider repeat CHAS    New fevers resolved  Resolving  Blood cultures x2 were negative from 3/31/2019    H/o breast cancer  Markedly elevated alk phos  Recommend continued diaz for mets    Type 2 diabetes mellitus  Hemoglobin A1c 6.3  Keep BS under 150 to help control current infection    I have performed a physical exam and reviewed and updated ROS and plan today 4/5/2019.  In review of yesterday's note 4/4/2019, there are no changes except as documented above.      Discussed with Dr. Patricia

## 2019-04-05 NOTE — PROGRESS NOTES
S/P Transacral fixation  Iliopsoas fluid collection drained in IR yesterday  Cultures pending  WBAT  No restrictions  PT/OT  Pain Control  ABX per ID

## 2019-04-05 NOTE — PROGRESS NOTES
"[facility] Medicine Daily Progress Note    Date of Service  4/4/2019    Chief Complaint  68 y.o. female admitted 3/8/2019 with right hip pain, fever and abnormal brain mri.    Hospital Course    Patient started on empiric antibiotics given fever.  She had abnormal findings on MRI brain and CT showed \"lesion\" on right iliopsoas.  This lesion was biopsied and turned out to be abscess.  She has history of breast cancer and there is also concern of brain lesions being metastatic though their appearance is not typical of this.      Interval Problem Update  3/12 Discussed with Dr Turner for second opinion of brain lesions and based on appearance not able to r/o or r/i any diagnosis.  Given patient's presentation of confusion, hip pain and fever  - this is more supportive of infectious etiology rather than malignant.  Fluid from right gluteal area sent to micro and as of yet - no growth.  Continue zosyn for now.  3/13 Patient with slight improvement in mentation.  Blood cultures and abscess cultures are showing no growth at this time.  TTE being done while I examined patient - no evidence of vegetations on this test.  ID consultation pending - d/w Dr Garcia.  3/14 Patient feeling well today, her pain is present but not as bad as prior to admission.  She was able to follow the conversation today and is agreeable to move forward with the CHAS tomorrow.  I spoke with her brother, René, and updated him on condition yesterday afternoon also.  Will also have PICC placed in next few days as long as blood cultures remain negative as I expect a prolonged antibiotic course of treatment.  3/15 Patient without new complaints, states she needs help to move in bed.  CHAS showed no evidence of vegetations and regular diet resumed.  Culture remains negative and biopsy of brain lesion may prove needed - will watch through the weekend and if mentation does not continue to improve, will discuss with neurosurgery on Monday.  3/16 Patient states she " is okay today, mentation has waxed and waned without significant improvement.  She was febrile earlier today.  No other acute events.  3/17 Patient with significant improvement in mentation today, she is aware of her hospitalization, not falling asleep mid sentence.  Her pain mediations were held most of yesterday and likely far too strong for patient and were affecting her awareness.  Oxycodone 5 discontinued and will use tramadol instead unless pain not well controlled.  CT brain with contrast ordered as a quick scan for comparison to MRI.    3/18 Patient feeling same today, discussed need to discuss with neurosurgery for possible biopsy.  Discussed with Dr Nguyen, he reviewed MRI and CT brain and he feels they are likely metastatic lesions but are far too small to biopsy.  His recommendation is to continue with antibiotics and re-image in 2 weeks to see if any change that would be amenable to biopsy or that would further declare infection.    3/26 Patient mental status continues to wax and wane.  ID ordered MRI lumbar, pelvis and sacrum for evaluation for source of infection, patient with continued low back pain and no real improvement since I saw her 7 days prior with continued antibiotics during this time.  Cultures have not growth pathogen, repeat MRI 3/28.   3/27 Patient somnolent most of the day today, MRI's completed and showing 2 areas of fluid collections, given her history are likely abscesses and will require drainage, CT guided drainage requested and patient NPO at MN for this.  D/w ID - cultures will be done on fluid collection.  Repeat CT brain ordered for tomorrow.  3/28 Patient up to chair, diet resumed as lovenox given this am and drainage of abscesses deferred until tomorrow.  Vanco/merrem to continue and cultures will be collected from abscesses.  3/29 Patient went to IR today, had drain placed in the right buttock into abscess cavity and fluid sent for culture.  Potassium is slightly low,  continue with PO replacement.  HR has improved from yesterday but patient PO intake still not at normal level, increase IVF rate to 100cc/hour.  3/30 Patient without fever since drain placed right buttock.  Purulent material in ÁNGEL bulb. Cultures thus far are still showing no growth.  Antibiotics to continue.  CT head ordered to evaluate change in past 2 weeks as recommended by neurosurgery.  3/31 Patient with fevers overnight - was altered during this time but has recovered.  She denies pain when examined by me.  She still has purulent material in the drainage tubing.  Will continue with current IV antibiotics - 6 weeks total antibiotics suspected - end date would be 4/24/19.  D/w ID.  4/1 Patient remains intermittently somnolent.  She wakes easily but falls back to sleep quickly.  She remained afebrile overnight.  ÁNGEL drain continues to drain purulent material.  ABX through 4/24 - once accepting facility found, patient okay to transfer with midline intact for completion of antibiotics.  4/2 patient up in chair, per RN, required minimal assist for transfer  Improving strength  Minimal output from ÁNGEL drain    4/4 increasing confusion today, moving all extremities spontaneously  Given morphine earlier in the morning, may contribute to encephalopathy  No clear signs of infection  Currently on antibiotics    Consultants/Specialty  ID - santino/Alex  Orthopedic surgery    Code Status  full    Disposition  Likely needs long-term IV antibiotics  Concern for iliac osteomyelitis, we will follow-up cultures and IR drain today  Has a midline, will likely need to change to PICC line for antibiotics, we will follow-up with ID for recommendations    Follow-up ammonia level      Review of Systems  Review of Systems   Unable to perform ROS: Mental status change        Physical Exam  Temp:  [36.2 °C (97.2 °F)-37.6 °C (99.6 °F)] 36.2 °C (97.2 °F)  Pulse:  [] 130  Resp:  [18-24] 24  BP: (127-168)/() 168/106  SpO2:  [91  %-95 %] 93 %    Physical Exam   Constitutional: She appears well-developed. No distress.   HENT:   Head: Normocephalic and atraumatic.   Eyes: Conjunctivae are normal.   Neck: Neck supple.   Cardiovascular: Normal rate, regular rhythm and intact distal pulses.    Pulmonary/Chest: Effort normal and breath sounds normal. No respiratory distress. She has no wheezes.   Abdominal: Soft. Bowel sounds are normal. She exhibits no distension.   Musculoskeletal: She exhibits no edema or tenderness.   Neurological: She is alert. No cranial nerve deficit. Coordination normal.   Skin: Skin is warm and dry.   Nursing note and vitals reviewed.      Fluids    Intake/Output Summary (Last 24 hours) at 04/04/19 1808  Last data filed at 04/03/19 2353   Gross per 24 hour   Intake              980 ml   Output              100 ml   Net              880 ml       Laboratory  Recent Labs      04/02/19   0014  04/03/19   0013  04/04/19   0022   WBC  3.3*  3.0*  3.5*   RBC  4.64  4.61  5.48*   HEMOGLOBIN  11.6*  11.6*  13.9   HEMATOCRIT  37.3  36.9*  43.7   MCV  80.4*  80.0*  79.7*   MCH  25.0*  25.2*  25.4*   MCHC  31.1*  31.4*  31.8*   RDW  50.4*  50.6*  50.7*   PLATELETCT  98*  114*  157*   MPV  10.7   --   11.4     Recent Labs      04/02/19   0014  04/03/19   0013  04/04/19   0022   SODIUM  130*  131*  133*   POTASSIUM  4.3  4.2  4.7   CHLORIDE  98  98  100   CO2  26  24  21   GLUCOSE  93  99  97   BUN  20  24*  23*   CREATININE  0.76  0.88  0.84   CALCIUM  10.2  10.4  11.5*                   Imaging  CT-ABDOMEN-PELVIS WITH   Final Result      1.  Rim-enhancing fluid collection in the right iliopsoas muscle may have increased in size slightly from the prior exam.      2.  Pigtail catheter in the right gluteus medius muscle. No residual fluid collection is appreciated.      3.  Pathologic fracture of the right ilium. Status post right SI joint fusion.      4.  Atherosclerosis.      5.  Cholelithiasis.      CT-HEAD WITH & W/O   Final Result       1.  Interval decrease in size and level of enhancement of multiple small punctate foci in both cerebral hemispheres and in the midbrain consistent with improving septic emboli.      2.  No hemorrhage or mass effect.      CT-DRAIN-RETROPERITONEAL   Final Result      1.  CT GUIDED CATHETER DRAINAGE OF RIGHT GLUTEAL ABSCESS.   2.  THE CURRENT PLAN IS TO OBTAIN A FOLLOW-UP CT SCAN IN 5-7 DAYS IF INDICATED. ORDERS WERE WRITTEN FOR ONCE DAILY IRRIGATION OF THE CATHETER WITH 5 ML STERILE SALINE FOR 3 DAYS.   3. THE ILIOPSOAS COLLECTION WAS NOT AMENABLE TO CATHETER DRAINAGE AS INTERVENING BOWEL AND/OR BONY STRUCTURES OBSTRUCT A PATHWAY TO THIS COLLECTION AT THIS TIME.      MR-LUMBAR SPINE-WITH & W/O   Final Result      1.  There are multifocal enhancing fluid collections in the right iliopsoas muscles and gluteus muscles adjacent to the right ilium. Right iliopsoas fluid collection measures an approximately 3.9 x 2.9 cm. The right gluteal fluid collection measures an    approximately 3.5 x 2.8 cm in size. The differential diagnosis includes postsurgical seroma and abscess.   2.  Post operative and degenerative changes as described above.      MR-PELVIS-WITH & W/O AND SEQUENCES   Final Result      1.  Surgical screw traverses both sacroiliac joint across presumed pathologic fracture of the right sacrum and the hardware obscures the adjacent tissues.      2.  No other evidence for bony metastatic disease      3.  Right gluteal musculature rim-enhancing fluid collection measuring 3.4 x 2.8 cm. This could be a postoperative seroma versus abscess      4.  osteoarthritis of both hip joint      5.  Prior hysterectomy      IR-MIDLINE CATHETER INSERTION WO GUIDANCE > AGE 3   Final Result                  Ultrasound-guided midline placement performed by qualified nursing staff    as above.          CT-HEAD WITH   Final Result      Multiple subcentimeter too numerous to count enhancing masses scattered throughout the  supratentorial and infratentorial brain. These demonstrate some surrounding vasogenic edema and most likely represent multifocal metastatic lesions. Other    considerations would include multifocal abscesses or septic emboli.      EC-CHAS W/O CONT   Final Result      EC-CHAS W/O CONT   Final Result      CT-NEEDLE BX-MUSCLE RIGHT   Final Result      CT-guided aspiration of pus like material in the right gluteal muscle lesion.      EC-ECHOCARDIOGRAM COMPLETE W/O CONT   Final Result      OUTSIDE IMAGES-CT CHEST/ABDOMEN/PELVIS   Final Result      OUTSIDE IMAGES-DX CHEST   Final Result      DE-[id number] FILM X-RAY   Final Result      OUTSIDE IMAGES-MR BRAIN   Final Result      OUTSIDE IMAGES-MR BRAIN   Final Result      CT-DRAIN-HEMATOMA - SEROMA    (Results Pending)        Assessment/Plan  * Lesion of brain   Assessment & Plan    Metastatic cancer vs infectious etiology  D/w Dr Black for questionable brain mets, recommending IR Bx of psoas muscle   IR Bx of psoas muscle ordered - was abscess  Empiric treatment of infection  TTE and CHAS negative for vegetations.  D/w Dr Nguyen - lesions likely metastatic based on his interpretation of MRI/CT - too small to biopsy, recommends continuing antibiotics and re-imaging in 2 weeks to see if there has been any change.  CT brain to be repeated - lesions shrinking and decreased in overall number - c/w septic emboli       Encephalopathy   Assessment & Plan    Likely toxic metabolic from infection  Waxes and wanes    4/3 resolving  Encourage activity, ambulating with standby assist and front wheel walker    Will need skilled nursing facility placement and, [place],  assisting    4/4 recurrent  No clear signs of infection  Administered morphine for pain  Limit narcotics  No focal deficits         Mass of iliopsoas muscle group   Assessment & Plan    Return of fluid collection, repeat drainage in IR with cultures, drain placed 3/29 - 10ml purulent material drained  and sent for culture - no growth a/o 3/30  Was on linezolid and cefepime and now on vanco/merrem - end date to be 6 weeks of treatment. (4/24/19)  ID consulting - d/w Dr Johnson    4/2 repeat imaging, evaluate for fluid collection next line minimal output from ÁNGEL    4/3 iliopsoas abscess, slightly increasing in size, discussed with IR, to drain today  N.p.o.  Discontinue drainage for gluteal abscess, resolved  Discussed with ID  Continue antibiotics    PX surgery, Dr. Porter consulted, concern for likely osteomyelitis  We will follow-up cultures, will need long-term IV antibiotics    4/4 s/p transsacral fixation, orthopedic surgery following  ÁNGEL drain placement per IR           Normocytic anemia   Assessment & Plan    stable     RLS (restless legs syndrome)- (present on admission)   Assessment & Plan    Pramipexole       Diabetes mellitus type 2 in obese (HCC)- (present on admission)   Assessment & Plan    Well controlled   Short acting insulin as needed   Accu-Checks, hypoglycemia protocol          History of breast cancer- (present on admission)   Assessment & Plan    Mets vs infection to brain  No known active breast cancer.    4/2 improving lesions on CT with improving encephalopathy  Continue antibiotics per ID     COPD (chronic obstructive pulmonary disease) (HCC)- (present on admission)   Assessment & Plan    Oxygen as needed, Respiratory protocol, Bronchodilators, Incentive spirometry      Urinary tract infection   Assessment & Plan    Culture remain no growth.       Fungal infection of the groin   Assessment & Plan    on nystatin powder 3/24     History of deep vein thrombosis- (present on admission)   Assessment & Plan    History of deep vein thrombosis   Was on Rivaroxaban as outpatient.     Held for Bx Mar 11  Restart AC after completing abx treatment         Essential hypertension- (present on admission)   Assessment & Plan    Restarted on losartan and furosemide with hold parameters.     GERD  (gastroesophageal reflux disease)- (present on admission)   Assessment & Plan    Omeprazole     Chronic pain- (present on admission)   Assessment & Plan     Multifactorial  Likely secondary to abscess, fracture  Pain control            VTE prophylaxis: scds

## 2019-04-05 NOTE — OR SURGEON
Immediate Post- Operative Note        PostOp Diagnosis: RIGHT ILIOPSOAS FLUID COLLECTION (PELVIC EXTRA-PERITONEAL)      Procedure(s): CT GUIDED CATHETER DRAINAGE WITH PLACEMENT OF 8 Hungarian LOCKING LOOP CATHETER RIGHT ILIOPSOAS VIA ANTERIOR APPROACH ALONG RIGHT ILIAC WING.    EVACUATION OF 15 ML BLOODY PUS. CONNECTED CATHETER TO SUCTION BULB.        Estimated Blood Loss: <1CC        Complications: NONE        4/4/2019     7:50 PM     Primo Persaud

## 2019-04-05 NOTE — THERAPY
"Pt demonstrating slight improvement in initiation and balance. Pt w/less posterior lean, thouh required some effort to achieve full upright posture and physical assist. Pt able to ambulate a few steps w/sequencing and verbal cuing. Pt mobility limited by emotial lability, pt required a lot of encouragement. Pt would benefit form further acute PT txs to progress towards goals and independence. Recommend post acute placement at an inpatient transitional care facility to address deficits.    Physical Therapy Treatment completed.   Bed Mobility:  Supine to Sit:  (NT--found up in chair)  Transfers: Sit to Stand: Minimal Assist  Gait: Level Of Assist: Moderate Assist with Front-Wheel Walker       Plan of Care: Will benefit from Physical Therapy 3 times per week    See \"Rehab Therapy-Acute\" Patient Summary Report for complete documentation.       "

## 2019-04-05 NOTE — PROGRESS NOTES
Received report and assumed patient care upon arrival from IR. Patient is sitting up in bed, A&Ox3. Patient reports no pain or nausea. Plan of care discussed, questions answered. Bed is in the lowest position and locked, call light within reach, non-skid socks in place, hourly rounding. Patient reports no further needs and this time.

## 2019-04-05 NOTE — PROGRESS NOTES
IR Procedure Note:    Dr. Persaud consented patient prior to procedure; all questions answered.    Site confirmed with CT imaging by patient, physician, RT, and RN.     Dr. Persaud completed a CT guided retroperitoneal abscess drain placement.  The patient tolerated the procedure well; ETCo2 baseline 27, with consistent waveform during the procedure.      Tegaderm and gauze applied to right lateral abdomen, CDI and soft.  Patient alert,  and verbally appropriate post procedure, vital signs stable during procedure and transport, see flow sheet for vital signs.  Report given to PATRICK Castro.  RN transported patient to R312 with Sao2 monitor = 96 % on oxygen via NC @ 2 lpm.     Sedation End Time: 1942

## 2019-04-06 LAB
ANION GAP SERPL CALC-SCNC: 4 MMOL/L (ref 0–11.9)
BASOPHILS # BLD AUTO: 0.6 % (ref 0–1.8)
BASOPHILS # BLD: 0.03 K/UL (ref 0–0.12)
BUN SERPL-MCNC: 17 MG/DL (ref 8–22)
CALCIUM SERPL-MCNC: 10.8 MG/DL (ref 8.5–10.5)
CHLORIDE SERPL-SCNC: 99 MMOL/L (ref 96–112)
CO2 SERPL-SCNC: 27 MMOL/L (ref 20–33)
CREAT SERPL-MCNC: 0.79 MG/DL (ref 0.5–1.4)
EOSINOPHIL # BLD AUTO: 0.18 K/UL (ref 0–0.51)
EOSINOPHIL NFR BLD: 3.3 % (ref 0–6.9)
ERYTHROCYTE [DISTWIDTH] IN BLOOD BY AUTOMATED COUNT: 50.7 FL (ref 35.9–50)
GLUCOSE SERPL-MCNC: 100 MG/DL (ref 65–99)
HCT VFR BLD AUTO: 35.6 % (ref 37–47)
HGB BLD-MCNC: 11.1 G/DL (ref 12–16)
IMM GRANULOCYTES # BLD AUTO: 0.03 K/UL (ref 0–0.11)
IMM GRANULOCYTES NFR BLD AUTO: 0.6 % (ref 0–0.9)
LYMPHOCYTES # BLD AUTO: 0.77 K/UL (ref 1–4.8)
LYMPHOCYTES NFR BLD: 14.3 % (ref 22–41)
MCH RBC QN AUTO: 25 PG (ref 27–33)
MCHC RBC AUTO-ENTMCNC: 31.2 G/DL (ref 33.6–35)
MCV RBC AUTO: 80.2 FL (ref 81.4–97.8)
MONOCYTES # BLD AUTO: 0.95 K/UL (ref 0–0.85)
MONOCYTES NFR BLD AUTO: 17.7 % (ref 0–13.4)
NEUTROPHILS # BLD AUTO: 3.42 K/UL (ref 2–7.15)
NEUTROPHILS NFR BLD: 63.5 % (ref 44–72)
NRBC # BLD AUTO: 0 K/UL
NRBC BLD-RTO: 0 /100 WBC
PLATELET # BLD AUTO: 190 K/UL (ref 164–446)
PMV BLD AUTO: 11.1 FL (ref 9–12.9)
POTASSIUM SERPL-SCNC: 4.3 MMOL/L (ref 3.6–5.5)
RBC # BLD AUTO: 4.44 M/UL (ref 4.2–5.4)
SODIUM SERPL-SCNC: 130 MMOL/L (ref 135–145)
WBC # BLD AUTO: 5.4 K/UL (ref 4.8–10.8)

## 2019-04-06 PROCEDURE — 700111 HCHG RX REV CODE 636 W/ 250 OVERRIDE (IP): Performed by: INTERNAL MEDICINE

## 2019-04-06 PROCEDURE — 36415 COLL VENOUS BLD VENIPUNCTURE: CPT

## 2019-04-06 PROCEDURE — 700105 HCHG RX REV CODE 258: Performed by: INTERNAL MEDICINE

## 2019-04-06 PROCEDURE — 770004 HCHG ROOM/CARE - ONCOLOGY PRIVATE *

## 2019-04-06 PROCEDURE — A9270 NON-COVERED ITEM OR SERVICE: HCPCS | Performed by: HOSPITALIST

## 2019-04-06 PROCEDURE — 700102 HCHG RX REV CODE 250 W/ 637 OVERRIDE(OP): Performed by: HOSPITALIST

## 2019-04-06 PROCEDURE — 99232 SBSQ HOSP IP/OBS MODERATE 35: CPT | Performed by: INTERNAL MEDICINE

## 2019-04-06 PROCEDURE — A9270 NON-COVERED ITEM OR SERVICE: HCPCS | Performed by: INTERNAL MEDICINE

## 2019-04-06 PROCEDURE — 700102 HCHG RX REV CODE 250 W/ 637 OVERRIDE(OP): Performed by: INTERNAL MEDICINE

## 2019-04-06 PROCEDURE — 80048 BASIC METABOLIC PNL TOTAL CA: CPT

## 2019-04-06 PROCEDURE — 85025 COMPLETE CBC W/AUTO DIFF WBC: CPT

## 2019-04-06 RX ADMIN — TRAMADOL HYDROCHLORIDE 50 MG: 50 TABLET, COATED ORAL at 11:27

## 2019-04-06 RX ADMIN — MEROPENEM 2 G: 1 INJECTION, POWDER, FOR SOLUTION INTRAVENOUS at 09:34

## 2019-04-06 RX ADMIN — GABAPENTIN 300 MG: 300 CAPSULE ORAL at 05:57

## 2019-04-06 RX ADMIN — POTASSIUM CHLORIDE 40 MEQ: 1500 TABLET, EXTENDED RELEASE ORAL at 05:57

## 2019-04-06 RX ADMIN — OXYCODONE HYDROCHLORIDE 2.5 MG: 5 TABLET ORAL at 21:18

## 2019-04-06 RX ADMIN — NYSTATIN: 100000 POWDER TOPICAL at 05:56

## 2019-04-06 RX ADMIN — HALOPERIDOL LACTATE 5 MG: 5 INJECTION, SOLUTION INTRAMUSCULAR at 02:11

## 2019-04-06 RX ADMIN — MEROPENEM 2 G: 1 INJECTION, POWDER, FOR SOLUTION INTRAVENOUS at 22:28

## 2019-04-06 RX ADMIN — FUROSEMIDE 40 MG: 40 TABLET ORAL at 05:56

## 2019-04-06 RX ADMIN — HALOPERIDOL LACTATE 5 MG: 5 INJECTION, SOLUTION INTRAMUSCULAR at 22:01

## 2019-04-06 RX ADMIN — LOSARTAN POTASSIUM 50 MG: 50 TABLET ORAL at 05:57

## 2019-04-06 RX ADMIN — OMEPRAZOLE 20 MG: 20 CAPSULE, DELAYED RELEASE ORAL at 05:57

## 2019-04-06 RX ADMIN — TRAMADOL HYDROCHLORIDE 50 MG: 50 TABLET, COATED ORAL at 18:36

## 2019-04-06 RX ADMIN — GABAPENTIN 300 MG: 300 CAPSULE ORAL at 17:50

## 2019-04-06 RX ADMIN — NYSTATIN: 100000 POWDER TOPICAL at 17:51

## 2019-04-06 RX ADMIN — VANCOMYCIN HYDROCHLORIDE 600 MG: 100 INJECTION, POWDER, LYOPHILIZED, FOR SOLUTION INTRAVENOUS at 20:00

## 2019-04-06 RX ADMIN — POTASSIUM CHLORIDE 40 MEQ: 1500 TABLET, EXTENDED RELEASE ORAL at 17:51

## 2019-04-06 NOTE — PROGRESS NOTES
Pt AOx2 (disoriented to time/event), no report of pain at this time. IV abx administered, no adverse rxn noted. Pt fatigued and moans occasionally. Call light within reach, personal belongings available, bed in lowest position, treaded socks on, and bed alarm on. Hourly rounding in place.

## 2019-04-06 NOTE — PROGRESS NOTES
Pharmacy Kinetics 68 y.o. female on vancomycin day # 13 (day corrected based on start date of 3/25/19)  2019    Currently on Vancomycin 700 mg iv q24hr (1800)    Indication for Treatment: empiric for possible CNS infection    Pertinent history per medical record: Admitted on 3/8/2019 for iliopsoas mass and multiple brain lesions.  There is concern that this is infectious.  All cultures are negative to date, but were drawn while on pip/tazo. CHAS was negative.  Patient has a history of breast cancer and DM.  Wound was drained on 3/29 showing purulent discharge (cultures remain negative).  ID is following.  Plan for completion of abx (4-6 weeks) in house prior to transfer to [facility] in [place].  Iliopsoas fluid collection drained in IR on  again showing purulent discharge.    Other antibiotics: Meropenem 2 g IV q12h    Allergies: Patient has no known allergies.     List concerns for renal function: advanced age    Pertinent cultures to date:   19 - retroperitoneal drain aspirate pending - no organisms seen  3/31/19 - blood (peripheral) x 2 - no growth to date  3/29/19: wound (right buttock) - no growth to date  3/12/19: blood (peripheral) - no growth to date  3/11/19: wound (right gluteal muscle) - no growth to date    Recent Labs      19   0022  19   0022  19   0544   WBC  3.5*  4.6*  5.4   NEUTSPOLYS  47.50  62.20  63.50     Recent Labs      19   0022  19   0022  19   0544   BUN  23*  19  17   CREATININE  0.84  0.61  0.79     Recent Labs      19   1726   VANCOTROUGH  20.4*      BP (!) 168/98   Pulse (!) 116   Temp 36.7 °C (98.1 °F) (Oral)   Resp 18   Ht 1.524 m (5')   Wt 62.5 kg (137 lb 12.6 oz)   SpO2 95%  Temp (24hrs), Av.8 °C (98.2 °F), Min:36.6 °C (97.8 °F), Max:37 °C (98.6 °F)      A/P   1. Vancomycin dose change: 600 mg IV q24h ()  2. Next vancomycin level: 3-4 days  3. Goal trough: 16-20 mcg/ml  4. Comments: renal function appears stable.  Level  slightly supratherapeutic.  Will decrease dosing to 600 mg q24h.  Will plan to recheck a level in another few days.  Pharmacy will continue to monitor and adjust dosing if needed.      Gisele Lewis, PharmD

## 2019-04-06 NOTE — CARE PLAN
Problem: Safety  Goal: Will remain free from falls  Pt is confused. Safety precautions in place. Bed in low position, treaded socks on, personal possessions in reach, call light in reach, hourly rounding in place and pt bed alarm on.    Problem: Discharge Barriers/Planning  Goal: Patient's continuum of care needs will be met  Pt is pending placement in [place] near her family. SW involved.

## 2019-04-06 NOTE — PROGRESS NOTES
"[facility] Medicine Daily Progress Note    Date of Service  4/6/2019    Chief Complaint  68 y.o. female admitted 3/8/2019 with right hip pain, fever and abnormal brain mri.    Hospital Course    Patient started on empiric antibiotics given fever.  She had abnormal findings on MRI brain and CT showed \"lesion\" on right iliopsoas.  This lesion was biopsied and turned out to be abscess.  She has history of breast cancer and there is also concern of brain lesions being metastatic though their appearance is not typical of this.      Interval Problem Update  3/12 Discussed with Dr Turner for second opinion of brain lesions and based on appearance not able to r/o or r/i any diagnosis.  Given patient's presentation of confusion, hip pain and fever  - this is more supportive of infectious etiology rather than malignant.  Fluid from right gluteal area sent to micro and as of yet - no growth.  Continue zosyn for now.  3/13 Patient with slight improvement in mentation.  Blood cultures and abscess cultures are showing no growth at this time.  TTE being done while I examined patient - no evidence of vegetations on this test.  ID consultation pending - d/w Dr Garcia.  3/14 Patient feeling well today, her pain is present but not as bad as prior to admission.  She was able to follow the conversation today and is agreeable to move forward with the CHAS tomorrow.  I spoke with her brother, René, and updated him on condition yesterday afternoon also.  Will also have PICC placed in next few days as long as blood cultures remain negative as I expect a prolonged antibiotic course of treatment.  3/15 Patient without new complaints, states she needs help to move in bed.  CHAS showed no evidence of vegetations and regular diet resumed.  Culture remains negative and biopsy of brain lesion may prove needed - will watch through the weekend and if mentation does not continue to improve, will discuss with neurosurgery on Monday.  3/16 Patient states she " is okay today, mentation has waxed and waned without significant improvement.  She was febrile earlier today.  No other acute events.  3/17 Patient with significant improvement in mentation today, she is aware of her hospitalization, not falling asleep mid sentence.  Her pain mediations were held most of yesterday and likely far too strong for patient and were affecting her awareness.  Oxycodone 5 discontinued and will use tramadol instead unless pain not well controlled.  CT brain with contrast ordered as a quick scan for comparison to MRI.    3/18 Patient feeling same today, discussed need to discuss with neurosurgery for possible biopsy.  Discussed with Dr Nguyen, he reviewed MRI and CT brain and he feels they are likely metastatic lesions but are far too small to biopsy.  His recommendation is to continue with antibiotics and re-image in 2 weeks to see if any change that would be amenable to biopsy or that would further declare infection.    3/26 Patient mental status continues to wax and wane.  ID ordered MRI lumbar, pelvis and sacrum for evaluation for source of infection, patient with continued low back pain and no real improvement since I saw her 7 days prior with continued antibiotics during this time.  Cultures have not growth pathogen, repeat MRI 3/28.   3/27 Patient somnolent most of the day today, MRI's completed and showing 2 areas of fluid collections, given her history are likely abscesses and will require drainage, CT guided drainage requested and patient NPO at [facility] for this.  D/w ID - cultures will be done on fluid collection.  Repeat CT brain ordered for tomorrow.  3/28 Patient up to chair, diet resumed as lovenox given this am and drainage of abscesses deferred until tomorrow.  Vanco/merrem to continue and cultures will be collected from abscesses.  3/29 Patient went to IR today, had drain placed in the right buttock into abscess cavity and fluid sent for culture.  Potassium is slightly low,  continue with PO replacement.  HR has improved from yesterday but patient PO intake still not at normal level, increase IVF rate to 100cc/hour.  3/30 Patient without fever since drain placed right buttock.  Purulent material in ÁNGEL bulb. Cultures thus far are still showing no growth.  Antibiotics to continue.  CT head ordered to evaluate change in past 2 weeks as recommended by neurosurgery.  3/31 Patient with fevers overnight - was altered during this time but has recovered.  She denies pain when examined by me.  She still has purulent material in the drainage tubing.  Will continue with current IV antibiotics - 6 weeks total antibiotics suspected - end date would be 4/24/19.  D/w ID.  4/1 Patient remains intermittently somnolent.  She wakes easily but falls back to sleep quickly.  She remained afebrile overnight.  ÁNGEL drain continues to drain purulent material.  ABX through 4/24 - once accepting facility found, patient okay to transfer with midline intact for completion of antibiotics.  4/2 patient up in chair, per RN, required minimal assist for transfer  Improving strength  Minimal output from ÁNGEL drain    4/4 increasing confusion today, moving all extremities spontaneously  Given morphine earlier in the morning, may contribute to encephalopathy  No clear signs of infection  Currently on antibiotics    4/5 improving confusion, following some commands  Oriented x2  Status post ÁNGEL drain placed    4/6 patient sitting up in chair  No new events  ÁNGEL draining    Consultants/Specialty  ID - santino/Alex  Orthopedic surgery    Code Status  full    Disposition  IV antibiotics duration per ID, 4-6 weeks  follow-up cultures   Has a midline, will likely need to change to PICC line for antibiotics, we will follow-up with ID for recommendations    Follow-up ammonia level      Review of Systems  Review of Systems   Unable to perform ROS: Mental status change        Physical Exam  Temp:  [36 °C (96.8 °F)-37 °C (98.6 °F)] 36 °C  (96.8 °F)  Pulse:  [107-116] 115  Resp:  [18-26] 18  BP: (134-168)/() 134/90  SpO2:  [92 %-95 %] 92 %    Physical Exam   Constitutional: No distress.   HENT:   Head: Normocephalic and atraumatic.   Eyes: Conjunctivae are normal. Right eye exhibits no discharge. Left eye exhibits no discharge.   Neck: Neck supple. No JVD present.   Cardiovascular: Normal rate and regular rhythm.    Pulmonary/Chest: Effort normal. No respiratory distress.   Abdominal: Soft. Bowel sounds are normal. She exhibits no distension. There is no tenderness.   Musculoskeletal: She exhibits no edema.   Neurological: She is alert. No cranial nerve deficit. Coordination normal.   Oriented x2  Following some commands     Skin: Skin is warm and dry.   Nursing note and vitals reviewed.      Fluids    Intake/Output Summary (Last 24 hours) at 04/06/19 1430  Last data filed at 04/06/19 0700   Gross per 24 hour   Intake                0 ml   Output               20 ml   Net              -20 ml       Laboratory  Recent Labs      04/04/19 0022 04/05/19   0022 04/06/19   0544   WBC  3.5*  4.6*  5.4   RBC  5.48*  4.60  4.44   HEMOGLOBIN  13.9  11.4*  11.1*   HEMATOCRIT  43.7  36.7*  35.6*   MCV  79.7*  79.8*  80.2*   MCH  25.4*  24.8*  25.0*   MCHC  31.8*  31.1*  31.2*   RDW  50.7*  51.0*  50.7*   PLATELETCT  157*  204  190   MPV  11.4  11.1  11.1     Recent Labs      04/04/19   0022  04/05/19   0022 04/06/19   0544   SODIUM  133*  135  130*   POTASSIUM  4.7  3.4*  4.3   CHLORIDE  100  101  99   CO2  21  26  27   GLUCOSE  97  93  100*   BUN  23*  19  17   CREATININE  0.84  0.61  0.79   CALCIUM  11.5*  10.7*  10.8*                   Imaging  CT-DRAIN-HEMATOMA - SEROMA   Final Result      1.  CT-GUIDED RETROPERITONEAL (EXTRAPERITONEAL) RIGHT PELVIC ABSCESS DRAINAGE AT THE RIGHT ILIOPSOAS. ORDERS WERE WRITTEN FOR ONCE DAILY IRRIGATION OF THE CATHETER WITH 5 ML STERILE SALINE.      2.  THE CURRENT PLAN IS TO MONITOR DRAINAGE OUTPUT AND OBTAIN A  FOLLOWUP CT SCAN IN 5-7 DAYS IF CLINICALLY INDICATED.      CT-ABDOMEN-PELVIS WITH   Final Result      1.  Rim-enhancing fluid collection in the right iliopsoas muscle may have increased in size slightly from the prior exam.      2.  Pigtail catheter in the right gluteus medius muscle. No residual fluid collection is appreciated.      3.  Pathologic fracture of the right ilium. Status post right SI joint fusion.      4.  Atherosclerosis.      5.  Cholelithiasis.      CT-HEAD WITH & W/O   Final Result      1.  Interval decrease in size and level of enhancement of multiple small punctate foci in both cerebral hemispheres and in the midbrain consistent with improving septic emboli.      2.  No hemorrhage or mass effect.      CT-DRAIN-RETROPERITONEAL   Final Result      1.  CT GUIDED CATHETER DRAINAGE OF RIGHT GLUTEAL ABSCESS.   2.  THE CURRENT PLAN IS TO OBTAIN A FOLLOW-UP CT SCAN IN 5-7 DAYS IF INDICATED. ORDERS WERE WRITTEN FOR ONCE DAILY IRRIGATION OF THE CATHETER WITH 5 ML STERILE SALINE FOR 3 DAYS.   3. THE ILIOPSOAS COLLECTION WAS NOT AMENABLE TO CATHETER DRAINAGE AS INTERVENING BOWEL AND/OR BONY STRUCTURES OBSTRUCT A PATHWAY TO THIS COLLECTION AT THIS TIME.      MR-LUMBAR SPINE-WITH & W/O   Final Result      1.  There are multifocal enhancing fluid collections in the right iliopsoas muscles and gluteus muscles adjacent to the right ilium. Right iliopsoas fluid collection measures an approximately 3.9 x 2.9 cm. The right gluteal fluid collection measures an    approximately 3.5 x 2.8 cm in size. The differential diagnosis includes postsurgical seroma and abscess.   2.  Post operative and degenerative changes as described above.      MR-PELVIS-WITH & W/O AND SEQUENCES   Final Result      1.  Surgical screw traverses both sacroiliac joint across presumed pathologic fracture of the right sacrum and the hardware obscures the adjacent tissues.      2.  No other evidence for bony metastatic disease      3.  Right  gluteal musculature rim-enhancing fluid collection measuring 3.4 x 2.8 cm. This could be a postoperative seroma versus abscess      4.  osteoarthritis of both hip joint      5.  Prior hysterectomy      IR-MIDLINE CATHETER INSERTION WO GUIDANCE > AGE 3   Final Result                  Ultrasound-guided midline placement performed by qualified nursing staff    as above.          CT-HEAD WITH   Final Result      Multiple subcentimeter too numerous to count enhancing masses scattered throughout the supratentorial and infratentorial brain. These demonstrate some surrounding vasogenic edema and most likely represent multifocal metastatic lesions. Other    considerations would include multifocal abscesses or septic emboli.      EC-CHAS W/O CONT   Final Result      EC-CHAS W/O CONT   Final Result      CT-NEEDLE BX-MUSCLE RIGHT   Final Result      CT-guided aspiration of pus like material in the right gluteal muscle lesion.      EC-ECHOCARDIOGRAM COMPLETE W/O CONT   Final Result      OUTSIDE IMAGES-CT CHEST/ABDOMEN/PELVIS   Final Result      OUTSIDE IMAGES-DX CHEST   Final Result      [id number] FILM X-RAY   Final Result      OUTSIDE IMAGES-MR BRAIN   Final Result      OUTSIDE IMAGES-MR BRAIN   Final Result           Assessment/Plan  * Lesion of brain   Assessment & Plan    Metastatic cancer vs infectious etiology  D/w Dr Black for questionable brain mets, recommending IR Bx of psoas muscle   IR Bx of psoas muscle ordered - was abscess  Empiric treatment of infection  TTE and CHAS negative for vegetations.  D/w Dr Nguyen - lesions likely metastatic based on his interpretation of MRI/CT - too small to biopsy, recommends continuing antibiotics and re-imaging in 2 weeks to see if there has been any change.  CT brain to be repeated - lesions shrinking and decreased in overall number - c/w septic emboli       Encephalopathy   Assessment & Plan    Likely toxic metabolic from infection  Waxes and wanes    4/3  resolving  Encourage activity, ambulating with standby assist and front wheel walker    Will need skilled nursing facility placement and, [place],  assisting    4/4 recurrent  No clear signs of infection  Administered morphine for pain  Limit narcotics  No focal deficits    4/5 slightly improving encephalopathy  Ongoing infection  Continue antibiotics    4/6 improving         Mass of iliopsoas muscle group   Assessment & Plan    Return of fluid collection, repeat drainage in IR with cultures, drain placed 3/29 - 10ml purulent material drained and sent for culture - no growth a/o 3/30  Was on linezolid and cefepime and now on vanco/merrem - end date to be 6 weeks of treatment. (4/24/19)  ID consulting - d/w Dr Johnson    4/2 repeat imaging, evaluate for fluid collection next line minimal output from ÁNGEL    4/3 iliopsoas abscess, slightly increasing in size, discussed with IR, to drain today  N.p.o.  Discontinue drainage for gluteal abscess, resolved  Discussed with ID  Continue antibiotics    PX surgery, Dr. Porter consulted, concern for likely osteomyelitis  We will follow-up cultures, will need long-term IV antibiotics    4/4 s/p transsacral fixation, orthopedic surgery following  ÁNGEL drain placement per IR    4/6 s/post drainage, IR placement  Continue antibiotics           Normocytic anemia   Assessment & Plan    stable     RLS (restless legs syndrome)- (present on admission)   Assessment & Plan    Pramipexole       Diabetes mellitus type 2 in obese (HCC)- (present on admission)   Assessment & Plan    Well controlled   Short acting insulin as needed   Accu-Checks, hypoglycemia protocol          History of breast cancer- (present on admission)   Assessment & Plan    Mets vs infection to brain  No known active breast cancer.    4/2 improving lesions on CT with improving encephalopathy  Continue antibiotics per ID     COPD (chronic obstructive pulmonary disease) (HCC)- (present on admission)    Assessment & Plan    Oxygen as needed, Respiratory protocol, Bronchodilators, Incentive spirometry      Urinary tract infection   Assessment & Plan    Culture remain no growth.       Fungal infection of the groin   Assessment & Plan    on nystatin powder 3/24     History of deep vein thrombosis- (present on admission)   Assessment & Plan    History of deep vein thrombosis   Was on Rivaroxaban as outpatient.     Held for Bx Mar 11  Restart AC after completing abx treatment         Essential hypertension- (present on admission)   Assessment & Plan    Restarted on losartan and furosemide with hold parameters.     GERD (gastroesophageal reflux disease)- (present on admission)   Assessment & Plan    Omeprazole     Chronic pain- (present on admission)   Assessment & Plan     Multifactorial  Likely secondary to abscess, fracture  Pain control            VTE prophylaxis: scds

## 2019-04-06 NOTE — PROGRESS NOTES
Pt A&Ox2-3. In and out of confusion. Moaning throughout the shift. C/o pain occasionally with some relief from oxycodone. Gave IV morphine one with more relief-oxygen applied at this time to keep Sp02 >90%. Continues to moan and when asking pt what she needs unable to answer. Incontinent of bowel and bladder. Assistance provided for meals. Out of bed with assist x2 to chair when eating. ÁNGEL drain to right hip area draining serosanguinous fluid. Brother called to get an update and talked to patient. Midline in place to arm and receiving IV antibiotics. [facility] tonight. Worked with PT. All fall precautions in place including alarm.

## 2019-04-06 NOTE — PROGRESS NOTES
Pt waxes and wanes between alert and lethargic at times. Haldol given last night. Pt initially denied pain this morning, then admitted to pain when found moaning, Tramadol given with seemingly improvement. Bed and chair alarm in place, call bell within reach. OOB to chair for meals. Cont plan of care.

## 2019-04-07 LAB
AMMONIA PLAS-SCNC: 41 UMOL/L (ref 11–45)
ANION GAP SERPL CALC-SCNC: 8 MMOL/L (ref 0–11.9)
BACTERIA WND AEROBE CULT: NORMAL
BASOPHILS # BLD AUTO: 0.5 % (ref 0–1.8)
BASOPHILS # BLD: 0.02 K/UL (ref 0–0.12)
BUN SERPL-MCNC: 22 MG/DL (ref 8–22)
CALCIUM SERPL-MCNC: 10.1 MG/DL (ref 8.5–10.5)
CHLORIDE SERPL-SCNC: 97 MMOL/L (ref 96–112)
CO2 SERPL-SCNC: 25 MMOL/L (ref 20–33)
CREAT SERPL-MCNC: 0.72 MG/DL (ref 0.5–1.4)
EOSINOPHIL # BLD AUTO: 0.32 K/UL (ref 0–0.51)
EOSINOPHIL NFR BLD: 7.4 % (ref 0–6.9)
ERYTHROCYTE [DISTWIDTH] IN BLOOD BY AUTOMATED COUNT: 50.3 FL (ref 35.9–50)
GLUCOSE SERPL-MCNC: 99 MG/DL (ref 65–99)
GRAM STN SPEC: NORMAL
HCT VFR BLD AUTO: 35.1 % (ref 37–47)
HGB BLD-MCNC: 11.1 G/DL (ref 12–16)
IMM GRANULOCYTES # BLD AUTO: 0.01 K/UL (ref 0–0.11)
IMM GRANULOCYTES NFR BLD AUTO: 0.2 % (ref 0–0.9)
LYMPHOCYTES # BLD AUTO: 0.72 K/UL (ref 1–4.8)
LYMPHOCYTES NFR BLD: 16.6 % (ref 22–41)
MCH RBC QN AUTO: 25.2 PG (ref 27–33)
MCHC RBC AUTO-ENTMCNC: 31.6 G/DL (ref 33.6–35)
MCV RBC AUTO: 79.8 FL (ref 81.4–97.8)
MONOCYTES # BLD AUTO: 0.77 K/UL (ref 0–0.85)
MONOCYTES NFR BLD AUTO: 17.7 % (ref 0–13.4)
NEUTROPHILS # BLD AUTO: 2.5 K/UL (ref 2–7.15)
NEUTROPHILS NFR BLD: 57.6 % (ref 44–72)
NRBC # BLD AUTO: 0 K/UL
NRBC BLD-RTO: 0 /100 WBC
PLATELET # BLD AUTO: 161 K/UL (ref 164–446)
PMV BLD AUTO: 10.5 FL (ref 9–12.9)
POTASSIUM SERPL-SCNC: 4.5 MMOL/L (ref 3.6–5.5)
RBC # BLD AUTO: 4.4 M/UL (ref 4.2–5.4)
SIGNIFICANT IND 70042: NORMAL
SITE SITE: NORMAL
SODIUM SERPL-SCNC: 130 MMOL/L (ref 135–145)
SOURCE SOURCE: NORMAL
WBC # BLD AUTO: 4.3 K/UL (ref 4.8–10.8)

## 2019-04-07 PROCEDURE — 85025 COMPLETE CBC W/AUTO DIFF WBC: CPT

## 2019-04-07 PROCEDURE — A9270 NON-COVERED ITEM OR SERVICE: HCPCS | Performed by: HOSPITALIST

## 2019-04-07 PROCEDURE — 700111 HCHG RX REV CODE 636 W/ 250 OVERRIDE (IP): Performed by: INTERNAL MEDICINE

## 2019-04-07 PROCEDURE — 700102 HCHG RX REV CODE 250 W/ 637 OVERRIDE(OP): Performed by: HOSPITALIST

## 2019-04-07 PROCEDURE — 80048 BASIC METABOLIC PNL TOTAL CA: CPT

## 2019-04-07 PROCEDURE — A9270 NON-COVERED ITEM OR SERVICE: HCPCS | Performed by: INTERNAL MEDICINE

## 2019-04-07 PROCEDURE — 36415 COLL VENOUS BLD VENIPUNCTURE: CPT

## 2019-04-07 PROCEDURE — 700105 HCHG RX REV CODE 258: Performed by: INTERNAL MEDICINE

## 2019-04-07 PROCEDURE — 700102 HCHG RX REV CODE 250 W/ 637 OVERRIDE(OP): Performed by: INTERNAL MEDICINE

## 2019-04-07 PROCEDURE — 82140 ASSAY OF AMMONIA: CPT

## 2019-04-07 PROCEDURE — 770004 HCHG ROOM/CARE - ONCOLOGY PRIVATE *

## 2019-04-07 PROCEDURE — 99233 SBSQ HOSP IP/OBS HIGH 50: CPT | Performed by: INTERNAL MEDICINE

## 2019-04-07 RX ORDER — FUROSEMIDE 20 MG/1
20 TABLET ORAL
Status: DISCONTINUED | OUTPATIENT
Start: 2019-04-08 | End: 2019-04-12

## 2019-04-07 RX ORDER — QUETIAPINE FUMARATE 25 MG/1
25 TABLET, FILM COATED ORAL EVERY 8 HOURS
Status: DISCONTINUED | OUTPATIENT
Start: 2019-04-07 | End: 2019-04-12

## 2019-04-07 RX ADMIN — IBUPROFEN 600 MG: 400 TABLET, FILM COATED ORAL at 16:17

## 2019-04-07 RX ADMIN — GABAPENTIN 300 MG: 300 CAPSULE ORAL at 06:28

## 2019-04-07 RX ADMIN — POTASSIUM CHLORIDE 40 MEQ: 1500 TABLET, EXTENDED RELEASE ORAL at 17:43

## 2019-04-07 RX ADMIN — LOSARTAN POTASSIUM 50 MG: 50 TABLET ORAL at 06:29

## 2019-04-07 RX ADMIN — GABAPENTIN 300 MG: 300 CAPSULE ORAL at 17:43

## 2019-04-07 RX ADMIN — NYSTATIN: 100000 POWDER TOPICAL at 17:43

## 2019-04-07 RX ADMIN — FUROSEMIDE 40 MG: 40 TABLET ORAL at 06:29

## 2019-04-07 RX ADMIN — MEROPENEM 2 G: 1 INJECTION, POWDER, FOR SOLUTION INTRAVENOUS at 07:58

## 2019-04-07 RX ADMIN — PRAMIPEXOLE DIHYDROCHLORIDE 0.25 MG: 0.25 TABLET ORAL at 14:42

## 2019-04-07 RX ADMIN — NYSTATIN: 100000 POWDER TOPICAL at 06:29

## 2019-04-07 RX ADMIN — QUETIAPINE FUMARATE 25 MG: 25 TABLET ORAL at 10:56

## 2019-04-07 RX ADMIN — OMEPRAZOLE 20 MG: 20 CAPSULE, DELAYED RELEASE ORAL at 06:29

## 2019-04-07 RX ADMIN — POTASSIUM CHLORIDE 40 MEQ: 1500 TABLET, EXTENDED RELEASE ORAL at 06:28

## 2019-04-07 RX ADMIN — QUETIAPINE FUMARATE 25 MG: 25 TABLET ORAL at 17:44

## 2019-04-07 RX ADMIN — ACETAMINOPHEN 650 MG: 325 TABLET, FILM COATED ORAL at 10:56

## 2019-04-07 RX ADMIN — TRAMADOL HYDROCHLORIDE 50 MG: 50 TABLET, COATED ORAL at 08:06

## 2019-04-07 RX ADMIN — MEROPENEM 2 G: 1 INJECTION, POWDER, FOR SOLUTION INTRAVENOUS at 22:07

## 2019-04-07 RX ADMIN — VANCOMYCIN HYDROCHLORIDE 600 MG: 100 INJECTION, POWDER, LYOPHILIZED, FOR SOLUTION INTRAVENOUS at 19:33

## 2019-04-07 RX ADMIN — OXYCODONE HYDROCHLORIDE 2.5 MG: 5 TABLET ORAL at 16:39

## 2019-04-07 NOTE — PROGRESS NOTES
Received change of shift report from roni RN. Assumed pt. Care at 0700. Pt. aox1 in a.m., sweaty and tachycardic, afebrile. The sweating has resolved and the pt. Is now aox2-3. Pt. Was a 3 assist to the chair and commode, but was a 2 assist back to bed about 2 hours later. Pt.'s ÁNGEL drain flushed with 10 mL, serosanguinous drainage. Pt. Easily arouseable when sleeping. Plan of care discussed, questions answered. Bed in lowest position with wheels locked. Call light within reach.

## 2019-04-07 NOTE — PROGRESS NOTES
Pharmacy Kinetics 68 y.o. female on vancomycin day # 14 2019    Currently on Vancomycin 600 mg iv q24hr    Indication for Treatment: empiric for possible CNS infection     Pertinent history per medical record: Admitted on 3/8/2019 for iliopsoas mass and multiple brain lesions.  There is concern that this is infectious.  All cultures are negative to date, but were drawn while on pip/tazo. CHAS was negative.  Patient has a history of breast cancer and DM.  Wound was drained on 3/29 showing purulent discharge (cultures remain negative).  ID is following.  Plan for completion of abx (4-6 weeks) in house prior to transfer to [facility] in [place].  Iliopsoas fluid collection drained in IR on  again showing purulent discharge.     Other antibiotics: Meropenem 2 g IV q12h     Allergies: Patient has no known allergies.      List concerns for renal function: advanced age     Pertinent cultures to date:   19 - retroperitoneal drain aspirate pending - no organisms seen  3/31/19 - blood (peripheral) x 2 - no growth to date  3/29/19: wound (right buttock) - no growth to date  3/12/19: blood (peripheral) - no growth to date  3/11/19: wound (right gluteal muscle) - no growth to date        Recent Labs      19   0022  19   0544  19   0015   WBC  4.6*  5.4  4.3*   NEUTSPOLYS  62.20  63.50  57.60     Recent Labs      19   0022  19   0544  19   0015   BUN  19  17  22   CREATININE  0.61  0.79  0.72     Recent Labs      19   1726   VANCOTROUGH  20.4*     Intake/Output Summary (Last 24 hours) at 19 1421  Last data filed at 19 0400   Gross per 24 hour   Intake              350 ml   Output                0 ml   Net              350 ml      /70   Pulse (!) 115   Temp 36.9 °C (98.4 °F) (Axillary)   Resp 18   Ht 1.524 m (5')   Wt 62.5 kg (137 lb 12.6 oz)   SpO2 92%  Temp (24hrs), Av.9 °C (98.5 °F), Min:36.6 °C (97.8 °F), Max:37.4 °C (99.3 °F)      A/P   1. Vancomycin  dose change: none  2. Next vancomycin level: 2-3 days or sooner if indicated  3. Goal trough: 16-20mcg/mL  4. Comments: Renal function appears stable with adequate voids. IV antibiotics duration per ID, 4-6 weeks. COntinue current regimen. Pharmacy will monitor/adjust as needed per protocol.     ZULEIKA Bojorquez, Pharm.D.

## 2019-04-07 NOTE — PROGRESS NOTES
Received report from day RN and assumed care of patient at 1900.  Assessed patient and reviewed labs and notes.  Patient AOx2, very anxious.  Patient moans and cries out almost continuously when staff not in room with her.  Patient medicated with haldol overnight for anxiety and rested throughout night.  Half dose of haldol may be sufficient for patient needs as patient was arousable but very sedate overnight.  Patient BP and HR remain elevated; no temp overnight; however, patient perspiring and warm to touch in AM.  Blankets removed and patient bathed with cool cloth.  Plan of care reviewed, patient board updated, safety precautions in place, and frequent rounding in practice.

## 2019-04-08 ENCOUNTER — APPOINTMENT (OUTPATIENT)
Dept: RADIOLOGY | Facility: MEDICAL CENTER | Age: 69
DRG: 981 | End: 2019-04-08
Attending: INTERNAL MEDICINE
Payer: MEDICARE

## 2019-04-08 LAB
ANION GAP SERPL CALC-SCNC: 8 MMOL/L (ref 0–11.9)
BASOPHILS # BLD AUTO: 0.3 % (ref 0–1.8)
BASOPHILS # BLD: 0.01 K/UL (ref 0–0.12)
BUN SERPL-MCNC: 23 MG/DL (ref 8–22)
CALCIUM SERPL-MCNC: 10.7 MG/DL (ref 8.5–10.5)
CHLORIDE SERPL-SCNC: 100 MMOL/L (ref 96–112)
CO2 SERPL-SCNC: 25 MMOL/L (ref 20–33)
CREAT SERPL-MCNC: 0.8 MG/DL (ref 0.5–1.4)
EOSINOPHIL # BLD AUTO: 0.33 K/UL (ref 0–0.51)
EOSINOPHIL NFR BLD: 9.9 % (ref 0–6.9)
ERYTHROCYTE [DISTWIDTH] IN BLOOD BY AUTOMATED COUNT: 50.4 FL (ref 35.9–50)
GLUCOSE SERPL-MCNC: 95 MG/DL (ref 65–99)
HCT VFR BLD AUTO: 38.5 % (ref 37–47)
HGB BLD-MCNC: 12.1 G/DL (ref 12–16)
IMM GRANULOCYTES # BLD AUTO: 0.02 K/UL (ref 0–0.11)
IMM GRANULOCYTES NFR BLD AUTO: 0.6 % (ref 0–0.9)
LYMPHOCYTES # BLD AUTO: 0.38 K/UL (ref 1–4.8)
LYMPHOCYTES NFR BLD: 11.4 % (ref 22–41)
MCH RBC QN AUTO: 25.3 PG (ref 27–33)
MCHC RBC AUTO-ENTMCNC: 31.4 G/DL (ref 33.6–35)
MCV RBC AUTO: 80.4 FL (ref 81.4–97.8)
MONOCYTES # BLD AUTO: 0.53 K/UL (ref 0–0.85)
MONOCYTES NFR BLD AUTO: 15.9 % (ref 0–13.4)
NEUTROPHILS # BLD AUTO: 2.07 K/UL (ref 2–7.15)
NEUTROPHILS NFR BLD: 61.9 % (ref 44–72)
NRBC # BLD AUTO: 0 K/UL
NRBC BLD-RTO: 0 /100 WBC
PLATELET # BLD AUTO: 126 K/UL (ref 164–446)
PMV BLD AUTO: 9.9 FL (ref 9–12.9)
POTASSIUM SERPL-SCNC: 3.9 MMOL/L (ref 3.6–5.5)
RBC # BLD AUTO: 4.79 M/UL (ref 4.2–5.4)
SODIUM SERPL-SCNC: 133 MMOL/L (ref 135–145)
WBC # BLD AUTO: 3.3 K/UL (ref 4.8–10.8)

## 2019-04-08 PROCEDURE — 700111 HCHG RX REV CODE 636 W/ 250 OVERRIDE (IP): Performed by: HOSPITALIST

## 2019-04-08 PROCEDURE — 700111 HCHG RX REV CODE 636 W/ 250 OVERRIDE (IP): Performed by: INTERNAL MEDICINE

## 2019-04-08 PROCEDURE — 700102 HCHG RX REV CODE 250 W/ 637 OVERRIDE(OP): Performed by: HOSPITALIST

## 2019-04-08 PROCEDURE — 80048 BASIC METABOLIC PNL TOTAL CA: CPT

## 2019-04-08 PROCEDURE — 97530 THERAPEUTIC ACTIVITIES: CPT

## 2019-04-08 PROCEDURE — 99233 SBSQ HOSP IP/OBS HIGH 50: CPT | Performed by: INTERNAL MEDICINE

## 2019-04-08 PROCEDURE — 85025 COMPLETE CBC W/AUTO DIFF WBC: CPT

## 2019-04-08 PROCEDURE — A9270 NON-COVERED ITEM OR SERVICE: HCPCS | Performed by: INTERNAL MEDICINE

## 2019-04-08 PROCEDURE — 36415 COLL VENOUS BLD VENIPUNCTURE: CPT

## 2019-04-08 PROCEDURE — 74177 CT ABD & PELVIS W/CONTRAST: CPT

## 2019-04-08 PROCEDURE — 97116 GAIT TRAINING THERAPY: CPT

## 2019-04-08 PROCEDURE — 700117 HCHG RX CONTRAST REV CODE 255: Performed by: INTERNAL MEDICINE

## 2019-04-08 PROCEDURE — 700105 HCHG RX REV CODE 258: Performed by: INTERNAL MEDICINE

## 2019-04-08 PROCEDURE — 770004 HCHG ROOM/CARE - ONCOLOGY PRIVATE *

## 2019-04-08 PROCEDURE — A9270 NON-COVERED ITEM OR SERVICE: HCPCS | Performed by: HOSPITALIST

## 2019-04-08 PROCEDURE — 700102 HCHG RX REV CODE 250 W/ 637 OVERRIDE(OP): Performed by: INTERNAL MEDICINE

## 2019-04-08 RX ADMIN — MORPHINE SULFATE 2 MG: 4 INJECTION INTRAVENOUS at 06:22

## 2019-04-08 RX ADMIN — NYSTATIN: 100000 POWDER TOPICAL at 06:17

## 2019-04-08 RX ADMIN — POTASSIUM CHLORIDE 40 MEQ: 1500 TABLET, EXTENDED RELEASE ORAL at 17:05

## 2019-04-08 RX ADMIN — OXYCODONE HYDROCHLORIDE 2.5 MG: 5 TABLET ORAL at 17:04

## 2019-04-08 RX ADMIN — VANCOMYCIN HYDROCHLORIDE 600 MG: 100 INJECTION, POWDER, LYOPHILIZED, FOR SOLUTION INTRAVENOUS at 19:58

## 2019-04-08 RX ADMIN — HALOPERIDOL LACTATE 2.5 MG: 5 INJECTION, SOLUTION INTRAMUSCULAR at 13:09

## 2019-04-08 RX ADMIN — OXYCODONE HYDROCHLORIDE 2.5 MG: 5 TABLET ORAL at 22:16

## 2019-04-08 RX ADMIN — TRAMADOL HYDROCHLORIDE 50 MG: 50 TABLET, COATED ORAL at 20:14

## 2019-04-08 RX ADMIN — MEROPENEM 2 G: 1 INJECTION, POWDER, FOR SOLUTION INTRAVENOUS at 08:00

## 2019-04-08 RX ADMIN — NYSTATIN: 100000 POWDER TOPICAL at 17:05

## 2019-04-08 RX ADMIN — MEROPENEM 2 G: 1 INJECTION, POWDER, FOR SOLUTION INTRAVENOUS at 22:34

## 2019-04-08 RX ADMIN — IOHEXOL 100 ML: 350 INJECTION, SOLUTION INTRAVENOUS at 16:41

## 2019-04-08 RX ADMIN — GABAPENTIN 300 MG: 300 CAPSULE ORAL at 17:05

## 2019-04-08 RX ADMIN — QUETIAPINE FUMARATE 25 MG: 25 TABLET ORAL at 18:45

## 2019-04-08 ASSESSMENT — GAIT ASSESSMENTS
ASSISTIVE DEVICE: FRONT WHEEL WALKER
DEVIATION: STEP TO;DECREASED BASE OF SUPPORT;BRADYKINETIC;SHUFFLED GAIT;DECREASED HEEL STRIKE;DECREASED TOE OFF
GAIT LEVEL OF ASSIST: MODERATE ASSIST
DISTANCE (FEET): 5

## 2019-04-08 ASSESSMENT — COGNITIVE AND FUNCTIONAL STATUS - GENERAL
MOBILITY SCORE: 8
SUGGESTED CMS G CODE MODIFIER MOBILITY: CM
TURNING FROM BACK TO SIDE WHILE IN FLAT BAD: UNABLE
MOVING FROM LYING ON BACK TO SITTING ON SIDE OF FLAT BED: UNABLE
MOVING TO AND FROM BED TO CHAIR: UNABLE
STANDING UP FROM CHAIR USING ARMS: A LOT
CLIMB 3 TO 5 STEPS WITH RAILING: TOTAL
WALKING IN HOSPITAL ROOM: A LOT

## 2019-04-08 NOTE — PROGRESS NOTES
Pharmacy Kinetics 68 y.o. female on vancomycin day # 15 2019    Currently on Vancomycin 600 mg iv q24hr     Indication for Treatment: empiric for possible CNS infection     Pertinent history per medical record: Admitted on 3/8/2019 for iliopsoas mass and multiple brain lesions.  There is concern that this is infectious.  All cultures are negative to date, but were drawn while on pip/tazo. CHAS was negative.  Patient has a history of breast cancer and DM.  Wound was drained on 3/29 showing purulent discharge (cultures remain negative).  ID is following.  Plan for completion of abx (4-6 weeks) in house prior to transfer to [facility] in [place].  Iliopsoas fluid collection drained in IR on  again showing purulent discharge.     Other antibiotics: Meropenem 2 g IV q12h     Allergies: Patient has no known allergies.      List concerns for renal function: advanced age     Pertinent cultures to date:   19 - retroperitoneal drain aspirate pending - no organisms seen  3/31/19 - blood (peripheral) x 2 - no growth to date  3/29/19: wound (right buttock) - no growth to date  3/12/19: blood (peripheral) - no growth to date  3/11/19: wound (right gluteal muscle) - no growth to date        Recent Labs      19   0544  19   0015  19   0013   WBC  5.4  4.3*  3.3*   NEUTSPOLYS  63.50  57.60  61.90     Recent Labs      19   0544  19   0015  19   0013   BUN  17  22  23*   CREATININE  0.79  0.72  0.80     Recent Labs      19   1726   VANCOTROUGH  20.4*     Intake/Output Summary (Last 24 hours) at 19 1441  Last data filed at 19 0600   Gross per 24 hour   Intake           612.62 ml   Output               35 ml   Net           577.62 ml      /68   Pulse (!) 108   Temp 37.2 °C (99 °F) (Temporal)   Resp 20   Ht 1.524 m (5')   Wt 62.5 kg (137 lb 12.6 oz)   SpO2 90%  Temp (24hrs), Av.7 °C (98 °F), Min:36.1 °C (97 °F), Max:37.2 °C (99 °F)      A/P   1. Vancomycin dose  change: none  2. Next vancomycin level: ~ 2 days or sooner if indicated  3. Goal trough: 16-20mcg/mL  Comments: Renal function appears stable. UOP not recorded. Continue current regimen. Pharmacy will monitor/adjust as needed per protocol.     ZULEIKA Bojorquez, Pharm.D.

## 2019-04-08 NOTE — CARE PLAN
Problem: Safety  Goal: Will remain free from injury  Outcome: PROGRESSING AS EXPECTED  Pt. Sedated and aox1-2 baseline. Pt. Near nurse's station with door open, bed alarm and hourly rounding in place.     Problem: Pain Management  Goal: Pain level will decrease to patient's comfort goal  Outcome: PROGRESSING AS EXPECTED  Pt. Has been sedated this shift, received IV morphine early this a.m. For nonverbal signs of pain. Pt. Arouseable, but drifts back to sleep immediately. Pt. Appears comfortable with non-labored breathing.

## 2019-04-08 NOTE — PROGRESS NOTES
"[facility] Medicine Daily Progress Note    Date of Service  4/8/2019    Chief Complaint  68 y.o. female admitted 3/8/2019 with right hip pain, fever and abnormal brain mri.    Hospital Course    Patient started on empiric antibiotics given fever.  She had abnormal findings on MRI brain and CT showed \"lesion\" on right iliopsoas.  This lesion was biopsied and turned out to be abscess.  She has history of breast cancer and there is also concern of brain lesions being metastatic though their appearance is not typical of this.      Interval Problem Update  3/12 Discussed with Dr Turner for second opinion of brain lesions and based on appearance not able to r/o or r/i any diagnosis.  Given patient's presentation of confusion, hip pain and fever  - this is more supportive of infectious etiology rather than malignant.  Fluid from right gluteal area sent to micro and as of yet - no growth.  Continue zosyn for now.  3/13 Patient with slight improvement in mentation.  Blood cultures and abscess cultures are showing no growth at this time.  TTE being done while I examined patient - no evidence of vegetations on this test.  ID consultation pending - d/w Dr Garcia.  3/14 Patient feeling well today, her pain is present but not as bad as prior to admission.  She was able to follow the conversation today and is agreeable to move forward with the CHAS tomorrow.  I spoke with her brother, René, and updated him on condition yesterday afternoon also.  Will also have PICC placed in next few days as long as blood cultures remain negative as I expect a prolonged antibiotic course of treatment.  3/15 Patient without new complaints, states she needs help to move in bed.  CHAS showed no evidence of vegetations and regular diet resumed.  Culture remains negative and biopsy of brain lesion may prove needed - will watch through the weekend and if mentation does not continue to improve, will discuss with neurosurgery on Monday.  3/16 Patient states she " is okay today, mentation has waxed and waned without significant improvement.  She was febrile earlier today.  No other acute events.  3/17 Patient with significant improvement in mentation today, she is aware of her hospitalization, not falling asleep mid sentence.  Her pain mediations were held most of yesterday and likely far too strong for patient and were affecting her awareness.  Oxycodone 5 discontinued and will use tramadol instead unless pain not well controlled.  CT brain with contrast ordered as a quick scan for comparison to MRI.    3/18 Patient feeling same today, discussed need to discuss with neurosurgery for possible biopsy.  Discussed with Dr Nguyen, he reviewed MRI and CT brain and he feels they are likely metastatic lesions but are far too small to biopsy.  His recommendation is to continue with antibiotics and re-image in 2 weeks to see if any change that would be amenable to biopsy or that would further declare infection.    3/26 Patient mental status continues to wax and wane.  ID ordered MRI lumbar, pelvis and sacrum for evaluation for source of infection, patient with continued low back pain and no real improvement since I saw her 7 days prior with continued antibiotics during this time.  Cultures have not growth pathogen, repeat MRI 3/28.   3/27 Patient somnolent most of the day today, MRI's completed and showing 2 areas of fluid collections, given her history are likely abscesses and will require drainage, CT guided drainage requested and patient NPO at [facility] for this.  D/w ID - cultures will be done on fluid collection.  Repeat CT brain ordered for tomorrow.  3/28 Patient up to chair, diet resumed as lovenox given this am and drainage of abscesses deferred until tomorrow.  Vanco/merrem to continue and cultures will be collected from abscesses.  3/29 Patient went to IR today, had drain placed in the right buttock into abscess cavity and fluid sent for culture.  Potassium is slightly low,  continue with PO replacement.  HR has improved from yesterday but patient PO intake still not at normal level, increase IVF rate to 100cc/hour.  3/30 Patient without fever since drain placed right buttock.  Purulent material in ÁNGEL bulb. Cultures thus far are still showing no growth.  Antibiotics to continue.  CT head ordered to evaluate change in past 2 weeks as recommended by neurosurgery.  3/31 Patient with fevers overnight - was altered during this time but has recovered.  She denies pain when examined by me.  She still has purulent material in the drainage tubing.  Will continue with current IV antibiotics - 6 weeks total antibiotics suspected - end date would be 4/24/19.  D/w ID.  4/1 Patient remains intermittently somnolent.  She wakes easily but falls back to sleep quickly.  She remained afebrile overnight.  ÁNGEL drain continues to drain purulent material.  ABX through 4/24 - once accepting facility found, patient okay to transfer with midline intact for completion of antibiotics.  4/2 patient up in chair, per RN, required minimal assist for transfer  Improving strength  Minimal output from ÁNGEL drain    4/4 increasing confusion today, moving all extremities spontaneously  Given morphine earlier in the morning, may contribute to encephalopathy  No clear signs of infection  Currently on antibiotics    4/5 improving confusion, following some commands  Oriented x2  Status post ÁNGLE drain placed    4/6 patient sitting up in chair  No new events  ÁNGEL draining    4/7 patient oriented and following some commands today  Intermittent impulsive behavior  Decreasing ÁNGEL drain output    4/8 slow to respond, following some commands  Min ÁNGEL output    Consultants/Specialty  ID - santino/Alex  Orthopedic surgery    Code Status  full    Disposition  IV antibiotics duration per ID, 4-6 weeks  follow-up cultures   Has a midline, will likely need to change to PICC line for antibiotics, we will follow-up with ID for  recommendations    F/u CT abd/pelvis    - family wants snf placement in [place], SW assisting      Review of Systems  Review of Systems   Unable to perform ROS: Mental status change        Physical Exam  Temp:  [36.1 °C (97 °F)-37.2 °C (99 °F)] 36.9 °C (98.4 °F)  Pulse:  [] 103  Resp:  [16-20] 18  BP: (149-162)/(68-84) 150/72  SpO2:  [90 %-94 %] 94 %    Physical Exam   Constitutional: No distress.   HENT:   Head: Normocephalic and atraumatic.   Mouth/Throat: No oropharyngeal exudate.   Eyes: Pupils are equal, round, and reactive to light. EOM are normal.   Neck: Normal range of motion. Neck supple.   Cardiovascular: Normal rate, regular rhythm and intact distal pulses.    Pulmonary/Chest: Effort normal. No respiratory distress.   Abdominal: Soft. Bowel sounds are normal. She exhibits no distension. There is no tenderness.   Musculoskeletal: She exhibits no edema.   Neurological: She is alert. No cranial nerve deficit. Coordination normal.   Oriented x2  Following some commands     Skin: Skin is warm and dry.   Nursing note and vitals reviewed.      Fluids    Intake/Output Summary (Last 24 hours) at 04/08/19 2030  Last data filed at 04/08/19 0600   Gross per 24 hour   Intake           412.62 ml   Output               10 ml   Net           402.62 ml       Laboratory  Recent Labs      04/06/19   0544  04/07/19   0015  04/08/19   0013   WBC  5.4  4.3*  3.3*   RBC  4.44  4.40  4.79   HEMOGLOBIN  11.1*  11.1*  12.1   HEMATOCRIT  35.6*  35.1*  38.5   MCV  80.2*  79.8*  80.4*   MCH  25.0*  25.2*  25.3*   MCHC  31.2*  31.6*  31.4*   RDW  50.7*  50.3*  50.4*   PLATELETCT  190  161*  126*   MPV  11.1  10.5  9.9     Recent Labs      04/06/19   0544  04/07/19   0015  04/08/19   0013   SODIUM  130*  130*  133*   POTASSIUM  4.3  4.5  3.9   CHLORIDE  99  97  100   CO2  27  25  25   GLUCOSE  100*  99  95   BUN  17  22  23*   CREATININE  0.79  0.72  0.80   CALCIUM  10.8*  10.1  10.7*                   Imaging  CT-ABDOMEN-PELVIS WITH    Final Result      1.  Resolution of right iliacus abscess with no associated fluid adjacent to the drainage catheter      2.  Interval removal of right gluteal drainage catheter      3.  Surgical screw traversing both sacroiliac joint with associated pathologic fracture of the right ilium and right medial sacrum      4.  Hepatomegaly      CT-DRAIN-HEMATOMA - SEROMA   Final Result      1.  CT-GUIDED RETROPERITONEAL (EXTRAPERITONEAL) RIGHT PELVIC ABSCESS DRAINAGE AT THE RIGHT ILIOPSOAS. ORDERS WERE WRITTEN FOR ONCE DAILY IRRIGATION OF THE CATHETER WITH 5 ML STERILE SALINE.      2.  THE CURRENT PLAN IS TO MONITOR DRAINAGE OUTPUT AND OBTAIN A FOLLOWUP CT SCAN IN 5-7 DAYS IF CLINICALLY INDICATED.      CT-ABDOMEN-PELVIS WITH   Final Result      1.  Rim-enhancing fluid collection in the right iliopsoas muscle may have increased in size slightly from the prior exam.      2.  Pigtail catheter in the right gluteus medius muscle. No residual fluid collection is appreciated.      3.  Pathologic fracture of the right ilium. Status post right SI joint fusion.      4.  Atherosclerosis.      5.  Cholelithiasis.      CT-HEAD WITH & W/O   Final Result      1.  Interval decrease in size and level of enhancement of multiple small punctate foci in both cerebral hemispheres and in the midbrain consistent with improving septic emboli.      2.  No hemorrhage or mass effect.      CT-DRAIN-RETROPERITONEAL   Final Result      1.  CT GUIDED CATHETER DRAINAGE OF RIGHT GLUTEAL ABSCESS.   2.  THE CURRENT PLAN IS TO OBTAIN A FOLLOW-UP CT SCAN IN 5-7 DAYS IF INDICATED. ORDERS WERE WRITTEN FOR ONCE DAILY IRRIGATION OF THE CATHETER WITH 5 ML STERILE SALINE FOR 3 DAYS.   3. THE ILIOPSOAS COLLECTION WAS NOT AMENABLE TO CATHETER DRAINAGE AS INTERVENING BOWEL AND/OR BONY STRUCTURES OBSTRUCT A PATHWAY TO THIS COLLECTION AT THIS TIME.      MR-LUMBAR SPINE-WITH & W/O   Final Result      1.  There are multifocal enhancing fluid collections in the right  iliopsoas muscles and gluteus muscles adjacent to the right ilium. Right iliopsoas fluid collection measures an approximately 3.9 x 2.9 cm. The right gluteal fluid collection measures an    approximately 3.5 x 2.8 cm in size. The differential diagnosis includes postsurgical seroma and abscess.   2.  Post operative and degenerative changes as described above.      MR-PELVIS-WITH & W/O AND SEQUENCES   Final Result      1.  Surgical screw traverses both sacroiliac joint across presumed pathologic fracture of the right sacrum and the hardware obscures the adjacent tissues.      2.  No other evidence for bony metastatic disease      3.  Right gluteal musculature rim-enhancing fluid collection measuring 3.4 x 2.8 cm. This could be a postoperative seroma versus abscess      4.  osteoarthritis of both hip joint      5.  Prior hysterectomy      IR-MIDLINE CATHETER INSERTION WO GUIDANCE > AGE 3   Final Result                  Ultrasound-guided midline placement performed by qualified nursing staff    as above.          CT-HEAD WITH   Final Result      Multiple subcentimeter too numerous to count enhancing masses scattered throughout the supratentorial and infratentorial brain. These demonstrate some surrounding vasogenic edema and most likely represent multifocal metastatic lesions. Other    considerations would include multifocal abscesses or septic emboli.      EC-CHAS W/O CONT   Final Result      EC-CHAS W/O CONT   Final Result      CT-NEEDLE BX-MUSCLE RIGHT   Final Result      CT-guided aspiration of pus like material in the right gluteal muscle lesion.      EC-ECHOCARDIOGRAM COMPLETE W/O CONT   Final Result      OUTSIDE IMAGES-CT CHEST/ABDOMEN/PELVIS   Final Result      OUTSIDE IMAGES-DX CHEST   Final Result      [id number] FILM X-RAY   Final Result      OUTSIDE IMAGES-MR BRAIN   Final Result      OUTSIDE IMAGES-MR BRAIN   Final Result           Assessment/Plan  * Lesion of brain   Assessment & Plan    Metastatic  cancer vs infectious etiology  D/w Dr Black for questionable brain mets, recommending IR Bx of psoas muscle   IR Bx of psoas muscle ordered - was abscess  Empiric treatment of infection  TTE and CHAS negative for vegetations.  D/w Dr Nguyen - lesions likely metastatic based on his interpretation of MRI/CT - too small to biopsy, recommends continuing antibiotics and re-imaging in 2 weeks to see if there has been any change.  CT brain to be repeated - lesions shrinking and decreased in overall number - c/w septic emboli       Encephalopathy   Assessment & Plan    Likely toxic metabolic from infection  Waxes and wanes    4/3 resolving  Encourage activity, ambulating with standby assist and front wheel walker    Will need skilled nursing facility placement and, [place],  assisting    4/4 recurrent  No clear signs of infection  Administered morphine for pain  Limit narcotics  No focal deficits    4/5 slightly improving encephalopathy  Ongoing infection  Continue antibiotics    4/6 improving    4/7 oriented x 2, waxing  Add seroquel    4/8 improving orientation  Monitor  Hold seroquel for sedation         Mass of iliopsoas muscle group   Assessment & Plan    Return of fluid collection, repeat drainage in IR with cultures, drain placed 3/29 - 10ml purulent material drained and sent for culture - no growth a/o 3/30  Was on linezolid and cefepime and now on vanco/merrem - end date to be 6 weeks of treatment. (4/24/19)  ID consulting - d/w Dr Johnson    4/2 repeat imaging, evaluate for fluid collection next line minimal output from ÁNGEL    4/3 iliopsoas abscess, slightly increasing in size, discussed with IR, to drain today  N.p.o.  Discontinue drainage for gluteal abscess, resolved  Discussed with ID  Continue antibiotics    PX surgery, Dr. Porter consulted, concern for likely osteomyelitis  We will follow-up cultures, will need long-term IV antibiotics    4/4 s/p transsacral fixation, orthopedic surgery  following  ÁGNEL drain placement per IR    4/6 s/post drainage, IR placement  Continue antibiotics    4/7  Monitor output- 20 ml  Will consider repeat imaging prior to removing drain  ID following    4/8  Repeat CT abd/pelvis, eval abscess, consider dc drain if resolved  On iv abx       Normocytic anemia   Assessment & Plan    stable     Hyponatremia- (present on admission)   Assessment & Plan    Improving, na 133  ? ams  Decrease lasix   Monitor closely     RLS (restless legs syndrome)- (present on admission)   Assessment & Plan    Pramipexole       Diabetes mellitus type 2 in obese (HCC)- (present on admission)   Assessment & Plan    Well controlled   Short acting insulin as needed   Accu-Checks, hypoglycemia protocol          History of breast cancer- (present on admission)   Assessment & Plan    Mets vs infection to brain  No known active breast cancer.    4/2 improving lesions on CT with improving encephalopathy  Continue antibiotics per ID     COPD (chronic obstructive pulmonary disease) (HCC)- (present on admission)   Assessment & Plan    Oxygen as needed, Respiratory protocol, Bronchodilators, Incentive spirometry      Urinary tract infection   Assessment & Plan    Culture remain no growth.       Fungal infection of the groin   Assessment & Plan    on nystatin powder 3/24     History of deep vein thrombosis- (present on admission)   Assessment & Plan    History of deep vein thrombosis   Was on Rivaroxaban as outpatient.     Held for Bx Mar 11  Restart AC after completing abx treatment         Essential hypertension- (present on admission)   Assessment & Plan    Restarted on losartan and furosemide with hold parameters.     GERD (gastroesophageal reflux disease)- (present on admission)   Assessment & Plan    Omeprazole     Chronic pain- (present on admission)   Assessment & Plan     Multifactorial  Likely secondary to abscess, fracture  Pain control            VTE prophylaxis: scds

## 2019-04-08 NOTE — THERAPY
"Pt demonstrating w/improved initation. Pt demonstrated improved sequencing for ambulation, though required maximal verbal and tactile cuing, as well as facilitation of stand pivot to transfer to chair w/FWW. Pt continues to be limited by fear, though redirectable w/encouragement. Pt would benefit from further acute PT txs to progress towards goals and independence. Recommend post acute placement at an inpatient transitional care facility to address deficits.    Physical Therapy Treatment completed.   Bed Mobility:  Supine to Sit: Minimal Assist  Transfers: Sit to Stand: Minimal Assist  Gait: Level Of Assist: Moderate Assist with Front-Wheel Walker       Plan of Care: Will benefit from Physical Therapy 4 times per week    See \"Rehab Therapy-Acute\" Patient Summary Report for complete documentation.         "

## 2019-04-08 NOTE — PROGRESS NOTES
Received report from day RN and assumed care of patient at 1900.  Assessed patient and reviewed labs and notes.  Patient sedated; arousable but drifts back to sleep.  Patient remained somnolent overnight with one episode of wakefulness and moaning lasting approximately 45 minutes.  Patient asked for and was given water but denied pain or other intervention and fell back asleep.  0330 dose of seroquel held as patient was still lethargic.  BP and resting HR remain elevated, consistent with baseline.  Patient afebrile overnight.  ÁNGEL drain to suction with 10 mL serosanguinous output overnight.  Patient awake in AM but not communicating and would not take PO medications (opened and wasted).  IV morphine administered due to nonverbal indicators of pain.  Plan of care reviewed, patient board updated, safety precautions in place, and frequent rounding in practice.

## 2019-04-08 NOTE — CARE PLAN
Problem: Mobility  Goal: Risk for activity intolerance will decrease  Outcome: PROGRESSING AS EXPECTED  Pt. Up to chair x3 this shift    Problem: Skin Integrity  Goal: Risk for impaired skin integrity will decrease  Outcome: PROGRESSING AS EXPECTED  Pt. Turns self, barrier cream and waffle cushion in place

## 2019-04-08 NOTE — PROGRESS NOTES
Pharmacy Pharmacotherapy Consult for LOS >30 days    Admit Date: 3/8/2019      Medications were reviewed for appropriateness and ongoing need.     Current Facility-Administered Medications   Medication Dose Route Frequency Provider Last Rate Last Dose   • QUEtiapine (SEROQUEL) tablet 25 mg  25 mg Oral Q8HR Darlyn Patricia D.O.   Stopped at 04/08/19 0300   • furosemide (LASIX) tablet 20 mg  20 mg Oral Q DAY Darlyn Patricia D.O.       • vancomycin 600 mg in  mL IVPB  600 mg Intravenous Q24HR Darlyn Patricia D.O.   Stopped at 04/07/19 2133   • potassium chloride SA (Kdur) tablet 40 mEq  40 mEq Oral BID STEVEN Daniels.O.   40 mEq at 04/07/19 1743   • loperamide (IMODIUM) capsule 2 mg  2 mg Oral 4X/DAY PRN Jaimee Keller D.O.   2 mg at 04/01/19 1221   • meropenem (MERREM) 2 g in  mL IVPB  2,000 mg Intravenous Q12HRS Beatriz Johnson M.D.   Stopped at 04/08/19 1100   • MD Alert...Vancomycin per Pharmacy   Other PHARMACY TO DOSE Beatriz Johnson M.D.       • nystatin (MYCOSTATIN) powder   Topical BID Yahir Ramírez M.D.       • labetalol (NORMODYNE) tablet 100 mg  100 mg Oral Q8HRS PRN Raina Magdaleno M.D.   100 mg at 04/05/19 1851   • ibuprofen (MOTRIN) tablet 600 mg  600 mg Oral Q6HRS PRN Raina Magdaleno M.D.   600 mg at 04/07/19 1617   • haloperidol lactate (HALDOL) injection 5 mg  5 mg Intravenous Q6HRS PRN Jaimee Keller D.O.   2.5 mg at 04/08/19 1309   • pramipexole (MIRAPEX) tablet 0.25 mg  0.25 mg Oral Q8HRS PRN Jaimee Keller D.O.   0.25 mg at 04/07/19 1442   • tramadol (ULTRAM) 50 MG tablet 50 mg  50 mg Oral Q6HRS PRN Mellissa Loco M.D.   50 mg at 04/07/19 0806   • losartan (COZAAR) tablet 50 mg  50 mg Oral DAILY Mellissa Loco M.D.   50 mg at 04/07/19 0629   • gabapentin (NEURONTIN) capsule 300 mg  300 mg Oral BID Rafal Parish M.D.   300 mg at 04/07/19 1743   • omeprazole (PRILOSEC) capsule 20 mg  20 mg Oral DAILY Rafal Parish M.D.   20 mg at 04/07/19 0629   • Respiratory Care per Protocol    Nebulization Continuous RT Rafal Parish M.D.       • acetaminophen (TYLENOL) tablet 650 mg  650 mg Oral Q6HRS PRN Rafal Parish M.D.   650 mg at 04/07/19 1056   • Pharmacy Consult Request ...Pain Management Review 1 Each  1 Each Other PHARMACY TO DOSE Rafal Parish M.D.        And   • oxyCODONE immediate-release (ROXICODONE) tablet 2.5 mg  2.5 mg Oral Q3HRS PRN Rafal Parish M.D.   2.5 mg at 04/07/19 1639    And   • morphine (pf) 4 mg/ml injection 2 mg  2 mg Intravenous Q3HRS PRN Rafal Parish M.D.   2 mg at 04/08/19 0622   • ondansetron (ZOFRAN) syringe/vial injection 4 mg  4 mg Intravenous Q4HRS PRN Rafal Parish M.D.       • ondansetron (ZOFRAN ODT) dispertab 4 mg  4 mg Oral Q4HRS PRN Rafal Parish M.D.           Recommendations:    1. Pt BP consistently elevated. Recommend considering losartan dose increase to 100 mg. Would not recommend increasing furosemide dose in the presence of hyponatremia. May also considering DCing PRN ibuprofen.    2. Pt recently started Seroquel 25 mg q8h with concurrent PRN Haldol. Would recommend considering checking pt's QTc.    3. Never used: IV & PO ondansetron    4. Immunizations: Influenza and Tdap inpatient. Shingrix outpatient.    Triston Busby, Pharmacy Intern

## 2019-04-08 NOTE — PROGRESS NOTES
Infectious Disease Progress Note    Author: Lito Burrows M.D. Date & Time of service: 4/8/2019  9:39 AM    Chief Complaint:  Multiple brain lesions  Iliopsoas lesion    Interval History:  68-year-old female with history of diabetes and breast cancer, admitted 3/8/2019 with abdominal pain, iliopsoas lesion, and an abnormal MRI with multiple brain lesions.  3/14 AF (99.2) WBC 5.8 more awake today as compared to yesterday but not oriented-denies pain, following commands. ECHO reviewed  3/15 AF (99.1) no WBC today Had CHAS today  3/16 temp 100.5 WBC 7.9 more confused today-naked but pushing off sheets  3/17 AF WBC not done Less obtunded today-not oriented. Denies pain, N/V, HA, visual changes etc  3/18 afebrile WBC 6 patient sitting up in bed eating breakfast, she is quite soft spoken.  She denies any headache, nausea or vomiting.  Endorses a mild cough.  3/19 AF pt denies any new symptoms, no HA. Brain lesions too small for biopsy per notes. Neurosurgery recommends repeating scan in 2 weeks.  3/20 afebrile patient is sleeping and difficult to arouse  3/21 patient is sitting up in bed and is alert and awake.  She however appears to have some waxing confusion but no she is in the hospital in [place].  Complains of lower back pain but no headache, nausea or vomiting  3/22 AF no CBC, continues to have bowel incontinence but not watery per RN, pt has no new complaints today, not engaged in conversation  3/23 afebrile WBC 6.3 patient complaining of stool incontinence and diarrhea, midline placed yesterday  3/24 afebrile patient rested comfortably without any new complaints  3/25 T- max 98.5, no new labs since 03/23. Reports mild HA and weakness. Emotional when asked about hx of hip fx.  3/26 afebrile, wbc 8.0. Reports feeling agitated. Per RN appeared confused throughout the night  3/27 no change overnight. RN reports agitation overnight. Pt sleeping, difficult to wake this am  3/28 reports slight improvement from yesterday.  Moving LE easier after abscess drained by primary team.   3/29/2019 no fevers.  WBC is down to 3.4.  Underwent drainage from the right gluteal area.  It was purulent.  3/30/2019-no fevers.  Remains slightly confused.  WBC is 3.9  3/31/2019 had fevers up to 102.6.  Complains of some right hand weakness.  No new issues overnight.  Family at bedside.  2019 no fevers.  Easily arousable.  ÁNGEL continues to drain.  2019-no fevers.  Awake and responsive but feels weak.  There is minimal drainage from the ÁNGEL.  As per the nursing the patient is incontinent of stool.  But when you talk to her she states she is just is not able to get up on time.  4/3/2019-complains of some weakness in the hand. No fevers. The drain is not draining much  - no fevers. She is anxious since she has been NPO. Awaiting IR drain   no fevers.  Got her drain.  There was purulent fluid.  WBCs 4.6.   afebrile, white count 3.3.  Trend remains in place.  Continues to have significant word finding difficulty.    Labs Reviewed    Review of Systems:  Review of Systems   Unable to perform ROS: Other   Limited review of systems secondary to intermittent confusion and word finding difficulty    Hemodynamics:  Temp (24hrs), Av.5 °C (97.7 °F), Min:36.1 °C (97 °F), Max:36.8 °C (98.3 °F)  Temperature: 36.6 °C (97.8 °F)  Pulse  Av.9  Min: 70  Max: 153  Blood Pressure : (!) 162/84       Physical Exam:  Physical Exam   Constitutional: She appears well-developed and well-nourished. No distress.   HENT:   Head: Normocephalic and atraumatic.   Dry mucous membranes   Eyes: Pupils are equal, round, and reactive to light. Right eye exhibits no discharge. Left eye exhibits no discharge.   Neck: Neck supple.   Cardiovascular: Normal rate and intact distal pulses.    Murmur heard.  Pulmonary/Chest: Effort normal and breath sounds normal. No respiratory distress. She has no wheezes.   Abdominal: Soft. Bowel sounds are normal. She exhibits no distension.  There is no tenderness.   IR drain right groin with small amount of purulent appearing material noted   Musculoskeletal: She exhibits no edema, tenderness or deformity.   Lymphadenopathy:     She has no cervical adenopathy.   Neurological: She is alert.   Word finding difficulty  Appears to have global weakness   Skin: Skin is warm. No rash noted. She is not diaphoretic.   Nursing note and vitals reviewed.      Meds:    Current Facility-Administered Medications:   •  QUEtiapine  •  furosemide  •  vancomycin  •  potassium chloride SA  •  loperamide  •  meropenem  •  MD Alert...Vancomycin per Pharmacy  •  nystatin  •  labetalol  •  ibuprofen  •  haloperidol lactate  •  pramipexole  •  tramadol  •  losartan  •  gabapentin  •  omeprazole  •  Respiratory Care per Protocol  •  acetaminophen  •  Notify provider if pain remains uncontrolled **AND** Use the numeric rating scale (NRS-11) on regular floors and Critical-Care Pain Observation Tool (CPOT) on ICUs/Trauma to assess pain **AND** Pulse Ox (Oximetry) **AND** Pharmacy Consult Request **AND** If patient difficult to arouse and/or has respiratory depression, stop any opiates that are currently infusing and call a Rapid Response. **AND** oxyCODONE immediate-release **AND** [DISCONTINUED] oxyCODONE immediate-release **AND** morphine injection  •  ondansetron  •  ondansetron    Labs:  Recent Labs      04/06/19 0544 04/07/19 0015 04/08/19 0013   WBC  5.4  4.3*  3.3*   RBC  4.44  4.40  4.79   HEMOGLOBIN  11.1*  11.1*  12.1   HEMATOCRIT  35.6*  35.1*  38.5   MCV  80.2*  79.8*  80.4*   MCH  25.0*  25.2*  25.3*   RDW  50.7*  50.3*  50.4*   PLATELETCT  190  161*  126*   MPV  11.1  10.5  9.9   NEUTSPOLYS  63.50  57.60  61.90   LYMPHOCYTES  14.30*  16.60*  11.40*   MONOCYTES  17.70*  17.70*  15.90*   EOSINOPHILS  3.30  7.40*  9.90*   BASOPHILS  0.60  0.50  0.30     Recent Labs      04/06/19 0544 04/07/19 0015 04/08/19 0013   SODIUM  130*  130*  133*   POTASSIUM  4.3   4.5  3.9   CHLORIDE  99  97  100   CO2  27  25  25   GLUCOSE  100*  99  95   BUN  17  22  23*     Recent Labs      04/06/19   0544  04/07/19   0015  04/08/19   0013   CREATININE  0.79  0.72  0.80       Imaging:  Ct-needle Bx-muscle Right    Result Date: 3/13/2019  3/11/2019 4:44 PM HISTORY/REASON FOR EXAM:  Right gluteal hypodense lesion. Moderate sedation was administered with continuous monitoring of the patient under the direct supervision of  Medications: 2 mg of Versed and 200 mcg of fentanyl Total sedation time 60 minutes Procedure: After the informed consent the patient was placed on the CT table on prone position. Localization CT scan was obtained. It demonstrates hypodense area in the right gluteus muscle. Subsequently a 17-gauge needle was advanced into the lesion. 20  mL of pus was aspirated. The materials were sent to the pathology. The patient tolerated the procedure without any immediate competition.     CT-guided aspiration of pus like material in the right gluteal muscle lesion.    Ec-echocardiogram Complete W/o Cont    Result Date: 3/13/2019  Transthoracic Echo Report Echocardiography Laboratory CONCLUSIONS No prior study is available for comparison. Normal left ventricular systolic function. Left ventricular ejection fraction is visually estimated to be 65%. Normal diastolic function. Normal inferior vena cava size and inspiratory collapse. Aortic sclerosis without stenosis. Estimated right ventricular systolic pressure  is 33 mmHg. No obvious valvular vegetations are noted on a decent quality study.  If further valvular assessment is clinically indicated, consider transesophageal echocardiogram. SAM,  LV EF:  65    % FINDINGS Left Ventricle Normal left ventricular size and systolic function. Normal left ventricular wall thickness. Left ventricular ejection fraction is visually estimated to be 65%. Normal diastolic function. Right Ventricle Normal right ventricular size and  "systolic function. Right Atrium Normal right atrial size. Normal inferior vena cava size and inspiratory collapse. Left Atrium Normal left atrial size. Mitral Valve Structurally normal mitral valve without significant stenosis or regurgitation. Aortic Valve Aortic sclerosis without stenosis. No aortic insufficiency. Tricuspid Valve Structurally normal tricuspid valve. Mild tricuspid regurgitation. Right atrial pressure is estimated to be 3 mmHg. Estimated right ventricular systolic pressure  is 33 mmHg. Pulmonic Valve The pulmonic valve is not well visualized. No pulmonic insufficiency. Pericardium Normal pericardium without effusion. Aorta The aortic root is normal.  Ascending aorta diameter is 3.0 cm. Claire Tripathi MD (Electronically Signed) Final Date:     13 March 2019                 14:38      Micro:  Results     Procedure Component Value Units Date/Time    CULTURE WOUND W/ GRAM STAIN [[id number]] Collected:  04/04/19 1931    Order Status:  Completed Specimen:  Wound Updated:  04/07/19 0731     Significant Indicator NEG     Source WND     Site Retroperitoneal Drain Aspirate     Culture Result Wound No growth at 72 hours     Gram Stain Result Many WBCs.  No organisms seen.      BLOOD CULTURE [[id number]] Collected:  03/31/19 1504    Order Status:  Completed Specimen:  Blood from Peripheral Updated:  04/05/19 1900     Significant Indicator NEG     Source BLD     Site PERIPHERAL     Blood Culture No growth after 5 days of incubation.    Narrative:       Per Hospital Policy: Only change Specimen Src: to \"Line\" if  specified by physician order.    BLOOD CULTURE [[id number]] Collected:  03/31/19 1504    Order Status:  Completed Specimen:  Blood from Peripheral Updated:  04/05/19 1900     Significant Indicator NEG     Source BLD     Site PERIPHERAL     Blood Culture No growth after 5 days of incubation.    Narrative:       Per Hospital Policy: Only change Specimen Src: to \"Line\" if  specified by physician order.    GRAM " STAIN [[id number]] Collected:  04/04/19 1931    Order Status:  Completed Specimen:  Wound Updated:  04/05/19 0832     Significant Indicator .     Source WND     Site Retroperitoneal Drain Aspirate     Gram Stain Result Many WBCs.  No organisms seen.      FLUID CULTURE W/GRAM STAIN [[id number]]     Order Status:  No result Specimen:  Body Fluid from Peritoneal Fluid           Assessment:  Active Hospital Problems    Diagnosis   • *Lesion of brain [G93.9]   • Mass of iliopsoas muscle group [M62.89]   • Encephalopathy [G93.40]   • Hyponatremia [E87.1]   • History of breast cancer [Z85.3]   • Diabetes mellitus type 2 in obese (HCC) [E11.69, E66.9]       Plan:  Multiple brain lesions-likely abscesses versus metastatic lesions  Low-grade fevers resolved  Wbc 4.5  Confusion improving  MRI with scattered small lesions incl aaron, too small to biopsy per neurosurgery  TTE neg; CHAS neg  Bcxs neg to date  Repeat CT scan on 3/30/2019 shows decrease in the size of the brain lesions, supporting that these are abscesses  Repeat MRI brain prior to completion of abx    Iliopsoas abscess, on treatment  CT + 2.6 cm lesion in the right iliopsoas region  S/p aspiration +WBCs-cultures negative to date  Bcxs remain neg  D/c'd Vanco secondary to elevated Vanco troughs 03/23 and patient not clearing well, restarted 03/25  Last vanc trough of 20.4 and 4/5  D/c cefepime 03/26 - may be contributing to confusion  Tolerating Meropenem started 03/26,  Midline pending  MRI w/ contrast lumbar, pelvis 03/27 - Iliopsoas and gluteal fluid collections noted.    Drain placed 3/29/2019  Cultures are negative so far  Aim for at least 4-6 weeks of antibiotics  Repeat CT scan shows resolution of the abscess in the gluteus but increase in the iliopsoas  Another drain was placed on 4/4/2019 and it shows purulent fluid  Aim  for at least 4-6 weeks of IV antibiotics likely followed by p.o. suppression given that this is adjacent to hardware  Consider repeat CHAS at  some point given her multiple brain lesions suspicious for infective endocarditis    New fevers resolved  Resolving  Blood cultures x2 were negative from 3/31/2019    H/o breast cancer  Markedly elevated alk phos, trending down  Continued diaz for mets    Type 2 diabetes mellitus  Hemoglobin A1c 6.3  Keep BS under 150 to help control current infection    I have performed a physical exam and reviewed and updated ROS and plan today 4/8/2019.  In review of yesterday's note 4/7/2019, there are no changes except as documented above.    Discussed with Dr. Patricia, ID will follow.  Please call with questions.

## 2019-04-08 NOTE — CARE PLAN
Problem: Infection  Goal: Will remain free from infection  Outcome: PROGRESSING AS EXPECTED    Intervention: Assess signs and symptoms of infection  Patient afebrile overnight; labs reviewed; IV antibiotics infusing per MAR      Problem: Skin Integrity  Goal: Risk for impaired skin integrity will decrease  Outcome: PROGRESSING AS EXPECTED    Intervention: Implement precautions to protect skin integrity in collaboration with the interdisciplinary team  Q2h turns performed overnight and pillows in use to protect heels/bony prominences

## 2019-04-09 LAB
ANION GAP SERPL CALC-SCNC: 6 MMOL/L (ref 0–11.9)
BASOPHILS # BLD AUTO: 0.6 % (ref 0–1.8)
BASOPHILS # BLD: 0.03 K/UL (ref 0–0.12)
BUN SERPL-MCNC: 15 MG/DL (ref 8–22)
CALCIUM SERPL-MCNC: 10.5 MG/DL (ref 8.5–10.5)
CHLORIDE SERPL-SCNC: 101 MMOL/L (ref 96–112)
CO2 SERPL-SCNC: 25 MMOL/L (ref 20–33)
CREAT SERPL-MCNC: 0.63 MG/DL (ref 0.5–1.4)
EOSINOPHIL # BLD AUTO: 0.22 K/UL (ref 0–0.51)
EOSINOPHIL NFR BLD: 4.8 % (ref 0–6.9)
ERYTHROCYTE [DISTWIDTH] IN BLOOD BY AUTOMATED COUNT: 50.5 FL (ref 35.9–50)
GLUCOSE SERPL-MCNC: 89 MG/DL (ref 65–99)
HCT VFR BLD AUTO: 37 % (ref 37–47)
HGB BLD-MCNC: 11.5 G/DL (ref 12–16)
IMM GRANULOCYTES # BLD AUTO: 0.02 K/UL (ref 0–0.11)
IMM GRANULOCYTES NFR BLD AUTO: 0.4 % (ref 0–0.9)
LYMPHOCYTES # BLD AUTO: 0.7 K/UL (ref 1–4.8)
LYMPHOCYTES NFR BLD: 15.1 % (ref 22–41)
MCH RBC QN AUTO: 24.9 PG (ref 27–33)
MCHC RBC AUTO-ENTMCNC: 31.1 G/DL (ref 33.6–35)
MCV RBC AUTO: 80.1 FL (ref 81.4–97.8)
MONOCYTES # BLD AUTO: 0.72 K/UL (ref 0–0.85)
MONOCYTES NFR BLD AUTO: 15.6 % (ref 0–13.4)
NEUTROPHILS # BLD AUTO: 2.94 K/UL (ref 2–7.15)
NEUTROPHILS NFR BLD: 63.5 % (ref 44–72)
NRBC # BLD AUTO: 0 K/UL
NRBC BLD-RTO: 0 /100 WBC
PLATELET # BLD AUTO: 155 K/UL (ref 164–446)
PMV BLD AUTO: 10.5 FL (ref 9–12.9)
POTASSIUM SERPL-SCNC: 4.2 MMOL/L (ref 3.6–5.5)
RBC # BLD AUTO: 4.62 M/UL (ref 4.2–5.4)
SODIUM SERPL-SCNC: 132 MMOL/L (ref 135–145)
WBC # BLD AUTO: 4.6 K/UL (ref 4.8–10.8)

## 2019-04-09 PROCEDURE — 700102 HCHG RX REV CODE 250 W/ 637 OVERRIDE(OP): Performed by: HOSPITALIST

## 2019-04-09 PROCEDURE — 80048 BASIC METABOLIC PNL TOTAL CA: CPT

## 2019-04-09 PROCEDURE — 700105 HCHG RX REV CODE 258: Performed by: INTERNAL MEDICINE

## 2019-04-09 PROCEDURE — 700111 HCHG RX REV CODE 636 W/ 250 OVERRIDE (IP): Performed by: INTERNAL MEDICINE

## 2019-04-09 PROCEDURE — 770004 HCHG ROOM/CARE - ONCOLOGY PRIVATE *

## 2019-04-09 PROCEDURE — 36415 COLL VENOUS BLD VENIPUNCTURE: CPT

## 2019-04-09 PROCEDURE — 99233 SBSQ HOSP IP/OBS HIGH 50: CPT | Performed by: INTERNAL MEDICINE

## 2019-04-09 PROCEDURE — A9270 NON-COVERED ITEM OR SERVICE: HCPCS | Performed by: HOSPITALIST

## 2019-04-09 PROCEDURE — A9270 NON-COVERED ITEM OR SERVICE: HCPCS | Performed by: INTERNAL MEDICINE

## 2019-04-09 PROCEDURE — 85025 COMPLETE CBC W/AUTO DIFF WBC: CPT

## 2019-04-09 PROCEDURE — 700102 HCHG RX REV CODE 250 W/ 637 OVERRIDE(OP): Performed by: INTERNAL MEDICINE

## 2019-04-09 RX ADMIN — QUETIAPINE FUMARATE 25 MG: 25 TABLET ORAL at 02:02

## 2019-04-09 RX ADMIN — OXYCODONE HYDROCHLORIDE 2.5 MG: 5 TABLET ORAL at 15:18

## 2019-04-09 RX ADMIN — OMEPRAZOLE 20 MG: 20 CAPSULE, DELAYED RELEASE ORAL at 04:27

## 2019-04-09 RX ADMIN — POTASSIUM CHLORIDE 40 MEQ: 1500 TABLET, EXTENDED RELEASE ORAL at 04:28

## 2019-04-09 RX ADMIN — FUROSEMIDE 20 MG: 20 TABLET ORAL at 04:27

## 2019-04-09 RX ADMIN — GABAPENTIN 300 MG: 300 CAPSULE ORAL at 18:13

## 2019-04-09 RX ADMIN — NYSTATIN: 100000 POWDER TOPICAL at 21:24

## 2019-04-09 RX ADMIN — QUETIAPINE FUMARATE 25 MG: 25 TABLET ORAL at 18:13

## 2019-04-09 RX ADMIN — HALOPERIDOL LACTATE 5 MG: 5 INJECTION, SOLUTION INTRAMUSCULAR at 08:30

## 2019-04-09 RX ADMIN — TRAMADOL HYDROCHLORIDE 50 MG: 50 TABLET, COATED ORAL at 18:13

## 2019-04-09 RX ADMIN — NYSTATIN: 100000 POWDER TOPICAL at 07:54

## 2019-04-09 RX ADMIN — VANCOMYCIN HYDROCHLORIDE 600 MG: 100 INJECTION, POWDER, LYOPHILIZED, FOR SOLUTION INTRAVENOUS at 21:37

## 2019-04-09 RX ADMIN — TRAMADOL HYDROCHLORIDE 50 MG: 50 TABLET, COATED ORAL at 07:54

## 2019-04-09 RX ADMIN — OXYCODONE HYDROCHLORIDE 2.5 MG: 5 TABLET ORAL at 04:27

## 2019-04-09 RX ADMIN — LOSARTAN POTASSIUM 50 MG: 50 TABLET ORAL at 04:27

## 2019-04-09 RX ADMIN — GABAPENTIN 300 MG: 300 CAPSULE ORAL at 04:27

## 2019-04-09 RX ADMIN — POTASSIUM CHLORIDE 40 MEQ: 1500 TABLET, EXTENDED RELEASE ORAL at 18:13

## 2019-04-09 RX ADMIN — MEROPENEM 2 G: 1 INJECTION, POWDER, FOR SOLUTION INTRAVENOUS at 12:32

## 2019-04-09 RX ADMIN — OXYCODONE HYDROCHLORIDE 2.5 MG: 5 TABLET ORAL at 22:17

## 2019-04-09 RX ADMIN — MEROPENEM 2 G: 1 INJECTION, POWDER, FOR SOLUTION INTRAVENOUS at 21:24

## 2019-04-09 ASSESSMENT — ENCOUNTER SYMPTOMS
NERVOUS/ANXIOUS: 0
COUGH: 0
WEAKNESS: 0
BACK PAIN: 0
PHOTOPHOBIA: 0
DIARRHEA: 0
DEPRESSION: 0
HEADACHES: 0
NAUSEA: 0
SORE THROAT: 0
INSOMNIA: 0
VOMITING: 0
CLAUDICATION: 0
CHILLS: 0
CONSTIPATION: 0
MYALGIAS: 1
FEVER: 0
SHORTNESS OF BREATH: 0
DIZZINESS: 0
HEARTBURN: 0
SPEECH CHANGE: 0
BLURRED VISION: 0
SENSORY CHANGE: 0
ABDOMINAL PAIN: 0

## 2019-04-09 NOTE — CARE PLAN
Problem: Psychosocial Needs:  Goal: Level of anxiety will decrease  Outcome: PROGRESSING AS EXPECTED  Haldol given for anxiety, pt. Inconsolable and not verbalizing needs at this time. Will continue to provide emotional support.     Problem: Pain Management  Goal: Pain level will decrease to patient's comfort goal  Outcome: PROGRESSING AS EXPECTED  Pt. Medicated per MAR with Tramadol for nonverbal indications of pain. Pt. Resting calmly at this time. Will medicate for pain per MAR.

## 2019-04-09 NOTE — PROGRESS NOTES
Pharmacy Kinetics 68 y.o. female on vancomycin day # 16 2019    Currently on Vancomycin 600 mg iv q24hr    Indication for Treatment: possible CNS infection    Pertinent history per medical record: Admitted on 3/8/2019 for iliopsoas mass and multiple brain lesions.  There is concern that this is infectious.  All cultures are negative to date, but were drawn while on pip/tazo. CHAS was negative.  Patient has a history of breast cancer and DM.  Wound was drained on 3/29 showing purulent discharge (cultures remain negative).  ID is following.  Plan for completion of abx (6 weeks) in house prior to transfer to [facility] in [place].  Iliopsoas fluid collection drained in IR on  again showing purulent discharge.    Other antibiotics: Meropenem 2 g IV q12h    Allergies: Patient has no known allergies.     List concerns for renal function: advanced age    Pertinent cultures to date:   3/11/19 - wound (R gluteal muscle) x 2: Negative  3/12/19 - blood (peripheral) x 2: Negative  3/29/19 - wound (R buttock): x 2: Negative  3/31/19 - blood (peripheral) x 2: Negative  19 - retroperitoneal drain aspirate: Negative      Recent Labs      19   0015  19   0013  19   0256   WBC  4.3*  3.3*  4.6*   NEUTSPOLYS  57.60  61.90  63.50     Recent Labs      19   0015  19   0013  19   0256   BUN  22  23*  15   CREATININE  0.72  0.80  0.63       Intake/Output Summary (Last 24 hours) at 19 1053  Last data filed at 19 0821   Gross per 24 hour   Intake           434.82 ml   Output              370 ml   Net            64.82 ml      /62   Pulse (!) 104   Temp 36.2 °C (97.1 °F) (Temporal)   Resp 20   Ht 1.524 m (5')   Wt 62.5 kg (137 lb 12.6 oz)   SpO2 94%  Temp (24hrs), Av.8 °C (98.2 °F), Min:36.2 °C (97.1 °F), Max:37.1 °C (98.8 °F)      A/P   1. Vancomycin dose change: None  2. Next vancomycin level: 1-2 days (not yet ordered)  3. Goal trough: 16-20 mcg/mL  4. Comments: Per ID,  plan is for 6 weeks of IV antibiotics. Renal function is basically stable; continue same dosing with plan for another level in the next day or two.    Sumi Boles, PharmD, BCOP, BCPS

## 2019-04-09 NOTE — PROGRESS NOTES
Infectious Disease Progress Note    Author: Lito Burrows M.D. Date & Time of service: 4/9/2019  10:29 AM    Chief Complaint:  Multiple brain lesions  Iliopsoas lesion    Interval History:  68-year-old female with history of diabetes and breast cancer, admitted 3/8/2019 with abdominal pain, iliopsoas lesion, and an abnormal MRI with multiple brain lesions.  3/14 AF (99.2) WBC 5.8 more awake today as compared to yesterday but not oriented-denies pain, following commands. ECHO reviewed  3/15 AF (99.1) no WBC today Had CHAS today  3/16 temp 100.5 WBC 7.9 more confused today-naked but pushing off sheets  3/17 AF WBC not done Less obtunded today-not oriented. Denies pain, N/V, HA, visual changes etc  3/18 afebrile WBC 6 patient sitting up in bed eating breakfast, she is quite soft spoken.  She denies any headache, nausea or vomiting.  Endorses a mild cough.  3/19 AF pt denies any new symptoms, no HA. Brain lesions too small for biopsy per notes. Neurosurgery recommends repeating scan in 2 weeks.  3/20 afebrile patient is sleeping and difficult to arouse  3/21 patient is sitting up in bed and is alert and awake.  She however appears to have some waxing confusion but no she is in the hospital in [place].  Complains of lower back pain but no headache, nausea or vomiting  3/22 AF no CBC, continues to have bowel incontinence but not watery per RN, pt has no new complaints today, not engaged in conversation  3/23 afebrile WBC 6.3 patient complaining of stool incontinence and diarrhea, midline placed yesterday  3/24 afebrile patient rested comfortably without any new complaints  3/25 T- max 98.5, no new labs since 03/23. Reports mild HA and weakness. Emotional when asked about hx of hip fx.  3/26 afebrile, wbc 8.0. Reports feeling agitated. Per RN appeared confused throughout the night  3/27 no change overnight. RN reports agitation overnight. Pt sleeping, difficult to wake this am  3/28 reports slight improvement from  yesterday. Moving LE easier after abscess drained by primary team.   3/29/2019 no fevers.  WBC is down to 3.4.  Underwent drainage from the right gluteal area.  It was purulent.  3/30/2019-no fevers.  Remains slightly confused.  WBC is 3.9  3/31/2019 had fevers up to 102.6.  Complains of some right hand weakness.  No new issues overnight.  Family at bedside.  2019 no fevers.  Easily arousable.  ÁNGEL continues to drain.  2019-no fevers.  Awake and responsive but feels weak.  There is minimal drainage from the ÁNGEL.  As per the nursing the patient is incontinent of stool.  But when you talk to her she states she is just is not able to get up on time.  4/3/2019-complains of some weakness in the hand. No fevers. The drain is not draining much  - no fevers. She is anxious since she has been NPO. Awaiting IR drain   no fevers.  Got her drain.  There was purulent fluid.  WBCs 4.6.   afebrile, white count 3.3.  IR drain remains in place.  Continues to have significant word finding difficulty.   per RN, patient more awake and interactive but remains confused with word finding difficulty.  Resting comfortably this morning    Labs Reviewed    Review of Systems:  Review of Systems   Unable to perform ROS: Other   Limited review of systems secondary to intermittent confusion and word finding difficulty    Hemodynamics:  Temp (24hrs), Av.9 °C (98.4 °F), Min:36.2 °C (97.1 °F), Max:37.2 °C (99 °F)  Temperature: 36.2 °C (97.1 °F)  Pulse  Av  Min: 70  Max: 153  Blood Pressure : 119/62       Physical Exam:  Physical Exam   Constitutional: She appears well-developed and well-nourished. No distress.   HENT:   Head: Normocephalic and atraumatic.   Dry mucous membranes   Eyes: Pupils are equal, round, and reactive to light. Right eye exhibits no discharge. Left eye exhibits no discharge.   Neck: Neck supple.   Cardiovascular: Normal rate and intact distal pulses.    Murmur heard.  Pulmonary/Chest: Effort normal  and breath sounds normal. No respiratory distress. She has no wheezes.   Abdominal: Soft. Bowel sounds are normal. She exhibits no distension. There is no tenderness.   IR drain right groin with small amount of purulent appearing material noted   Musculoskeletal: She exhibits no edema, tenderness or deformity.   Lymphadenopathy:     She has no cervical adenopathy.   Neurological: She is alert.   Word finding difficulty  Appears to have global weakness   Skin: Skin is warm. No rash noted. She is not diaphoretic.   Nursing note and vitals reviewed.    Meds:    Current Facility-Administered Medications:   •  QUEtiapine  •  furosemide  •  vancomycin  •  potassium chloride SA  •  loperamide  •  MD Alert...Vancomycin per Pharmacy  •  nystatin  •  labetalol  •  ibuprofen  •  haloperidol lactate  •  pramipexole  •  tramadol  •  losartan  •  gabapentin  •  omeprazole  •  Respiratory Care per Protocol  •  acetaminophen  •  Notify provider if pain remains uncontrolled **AND** Use the numeric rating scale (NRS-11) on regular floors and Critical-Care Pain Observation Tool (CPOT) on ICUs/Trauma to assess pain **AND** Pulse Ox (Oximetry) **AND** Pharmacy Consult Request **AND** If patient difficult to arouse and/or has respiratory depression, stop any opiates that are currently infusing and call a Rapid Response. **AND** oxyCODONE immediate-release **AND** [DISCONTINUED] oxyCODONE immediate-release **AND** morphine injection  •  ondansetron  •  ondansetron    Labs:  Recent Labs      04/07/19   0015  04/08/19   0013  04/09/19   0256   WBC  4.3*  3.3*  4.6*   RBC  4.40  4.79  4.62   HEMOGLOBIN  11.1*  12.1  11.5*   HEMATOCRIT  35.1*  38.5  37.0   MCV  79.8*  80.4*  80.1*   MCH  25.2*  25.3*  24.9*   RDW  50.3*  50.4*  50.5*   PLATELETCT  161*  126*  155*   MPV  10.5  9.9  10.5   NEUTSPOLYS  57.60  61.90  63.50   LYMPHOCYTES  16.60*  11.40*  15.10*   MONOCYTES  17.70*  15.90*  15.60*   EOSINOPHILS  7.40*  9.90*  4.80   BASOPHILS   0.50  0.30  0.60     Recent Labs      04/07/19   0015  04/08/19   0013  04/09/19   0256   SODIUM  130*  133*  132*   POTASSIUM  4.5  3.9  4.2   CHLORIDE  97  100  101   CO2  25  25  25   GLUCOSE  99  95  89   BUN  22  23*  15     Recent Labs      04/07/19   0015  04/08/19   0013  04/09/19   0256   CREATININE  0.72  0.80  0.63       Imaging:  Ct-needle Bx-muscle Right    Result Date: 3/13/2019  3/11/2019 4:44 PM HISTORY/REASON FOR EXAM:  Right gluteal hypodense lesion. Moderate sedation was administered with continuous monitoring of the patient under the direct supervision of  Medications: 2 mg of Versed and 200 mcg of fentanyl Total sedation time 60 minutes Procedure: After the informed consent the patient was placed on the CT table on prone position. Localization CT scan was obtained. It demonstrates hypodense area in the right gluteus muscle. Subsequently a 17-gauge needle was advanced into the lesion. 20  mL of pus was aspirated. The materials were sent to the pathology. The patient tolerated the procedure without any immediate competition.     CT-guided aspiration of pus like material in the right gluteal muscle lesion.    Ec-echocardiogram Complete W/o Cont    Result Date: 3/13/2019  Transthoracic Echo Report Echocardiography Laboratory CONCLUSIONS No prior study is available for comparison. Normal left ventricular systolic function. Left ventricular ejection fraction is visually estimated to be 65%. Normal diastolic function. Normal inferior vena cava size and inspiratory collapse. Aortic sclerosis without stenosis. Estimated right ventricular systolic pressure  is 33 mmHg. No obvious valvular vegetations are noted on a decent quality study.  If further valvular assessment is clinically indicated, consider transesophageal echocardiogram. SAM,  LV EF:  65    % FINDINGS Left Ventricle Normal left ventricular size and systolic function. Normal left ventricular wall thickness. Left ventricular  "ejection fraction is visually estimated to be 65%. Normal diastolic function. Right Ventricle Normal right ventricular size and systolic function. Right Atrium Normal right atrial size. Normal inferior vena cava size and inspiratory collapse. Left Atrium Normal left atrial size. Mitral Valve Structurally normal mitral valve without significant stenosis or regurgitation. Aortic Valve Aortic sclerosis without stenosis. No aortic insufficiency. Tricuspid Valve Structurally normal tricuspid valve. Mild tricuspid regurgitation. Right atrial pressure is estimated to be 3 mmHg. Estimated right ventricular systolic pressure  is 33 mmHg. Pulmonic Valve The pulmonic valve is not well visualized. No pulmonic insufficiency. Pericardium Normal pericardium without effusion. Aorta The aortic root is normal.  Ascending aorta diameter is 3.0 cm. Claire Tripathi MD (Electronically Signed) Final Date:     13 March 2019                 14:38      Micro:  Results     Procedure Component Value Units Date/Time    CULTURE WOUND W/ GRAM STAIN [[id number]] Collected:  04/04/19 1931    Order Status:  Completed Specimen:  Wound Updated:  04/07/19 0731     Significant Indicator NEG     Source WND     Site Retroperitoneal Drain Aspirate     Culture Result Wound No growth at 72 hours     Gram Stain Result Many WBCs.  No organisms seen.      BLOOD CULTURE [[id number]] Collected:  03/31/19 1504    Order Status:  Completed Specimen:  Blood from Peripheral Updated:  04/05/19 1900     Significant Indicator NEG     Source BLD     Site PERIPHERAL     Blood Culture No growth after 5 days of incubation.    Narrative:       Per Hospital Policy: Only change Specimen Src: to \"Line\" if  specified by physician order.    BLOOD CULTURE [[id number]] Collected:  03/31/19 1504    Order Status:  Completed Specimen:  Blood from Peripheral Updated:  04/05/19 1900     Significant Indicator NEG     Source BLD     Site PERIPHERAL     Blood Culture No growth after 5 days of " "incubation.    Narrative:       Per Hospital Policy: Only change Specimen Src: to \"Line\" if  specified by physician order.    GRAM STAIN [[id number]] Collected:  04/04/19 1931    Order Status:  Completed Specimen:  Wound Updated:  04/05/19 0832     Significant Indicator .     Source WND     Site Retroperitoneal Drain Aspirate     Gram Stain Result Many WBCs.  No organisms seen.      FLUID CULTURE W/GRAM STAIN [[id number]]     Order Status:  No result Specimen:  Body Fluid from Peritoneal Fluid           Assessment:  Active Hospital Problems    Diagnosis   • *Lesion of brain [G93.9]   • Mass of iliopsoas muscle group [M62.89]   • Encephalopathy [G93.40]   •    • History of breast cancer [Z85.3]   • Diabetes mellitus type 2 in obese (HCC) [E11.69, E66.9]       Plan:  Multiple brain lesions-likely abscesses versus metastatic lesions  Low-grade fevers resolved  Wbc 4.5  Confusion improving  MRI with scattered small lesions incl aaron, too small to biopsy per neurosurgery  TTE neg; CHAS neg  Bcxs neg to date  Repeat CT scan on 3/30/2019 shows decrease in the size of the brain lesions, supporting that these are abscesses  Repeat MRI brain prior to completion of abx    Iliopsoas abscess, on treatment  CT + 2.6 cm lesion in the right iliopsoas region  S/p aspiration +WBCs-cultures negative to date  Bcxs remain neg  D/c'd Vanco secondary to elevated Vanco troughs 03/23 and patient not clearing well, restarted 03/25  Last vanc trough of 20.4 and 4/5  D/c cefepime 03/26 - may be contributing to confusion  Tolerating Meropenem started 03/26,  Midline pending  MRI w/ contrast lumbar, pelvis 03/27 - Iliopsoas and gluteal fluid collections noted.    Drain placed 3/29/2019  Cultures are negative so far  Repeat CT scan shows resolution of the abscess in the gluteus but increase in the iliopsoas  Another drain was placed on 4/4/2019 and it shows purulent fluid  Repeat CT from 4/8 with resolution of the fluid collections after drainage " per my read  Aim  for at least 6 weeks of IV antibiotics (end date: 4/24/2018) likely followed by p.o. suppression given that this is adjacent to hardware  Consider repeat CHAS at some point given her multiple brain lesions suspicious for infective endocarditis  Continue broad-spectrum IV vancomycin and meropenem for now.  Not attempting de-escalation at this time since we have no positive cultures and given the consequences of worsening brain abscesses    H/o breast cancer  Markedly elevated alk phos, trending down  Continued diaz for mets    Type 2 diabetes mellitus  Hemoglobin A1c 6.3  Keep BS under 150 to help control current infection    Altered mental status  Multifactorial, likely large contribution from her multiple brain abscesses, monitor    I have performed a physical exam and reviewed and updated ROS and plan today 4/9/2019.  In review of yesterday's note 4/8/2019, there are no changes except as documented above.    Discussed with Dr. Keller, ID will follow.  Please call with questions.

## 2019-04-09 NOTE — PROGRESS NOTES
Received change of shift report from roni RN. Assumed pt. Care at 0700. Pt. aox1-2, lethargic until 1300 today. Pt. Was up to chair for lunch, very anxious during ambulation. CT scan done today, will possibly remove ÁNGEL drain per MD. Received haldol once for agitation, otherwise seemed calm throughout most of the afternoon/evening.

## 2019-04-09 NOTE — DIETARY
Nutrition Services Update: Pt seen for weekly nutrition screen - poor PO documented in ADL's.     Day 32 of admit. Regular diet. ADL documentation shows intake has been <25% of most recent meals.     Visited pt bedside. Pt was very sleepy and slightly disoriented - was unable to answer questions regarding PO intake and appetite. Upon talking with pt's nurse, pt consumes 100% of Boost Glucose Control TID. Nurse also reported that pt's family brings food for pt. Nurse reports overall PO intake is marginal.    Plan/Recommend:   1. Increase frequency of Boost Glucose Control to QID.  2. Encourage intake of meals and supplements.  3. Document intake of meals and supplements as % taken in ADL's to provide interdisciplinary communication across all shifts.  4. Monitor weight.  5. Nutrition rep will continue to see patient for ongoing meal and snack preferences.     RD to monitor for improved PO intake.

## 2019-04-09 NOTE — PROGRESS NOTES
Received change of shift report from roni RN. Assumed pt. Care at 0700. Pt. aox1-2, has been agitated restless and inconsolable. Haldol and tramadol given per MAR. Midline dressing changes to be scheduled every Saturday per VAT nurse, will change today since it was not done last Saturday. Pt. Incontinent of urine, moans to communicate needs. Pt.'s mouth is very dry, she seems to enjoy mouth moisturizer. Bed in lowest position with wheels locked. Hourly rounding and bed alarm in place. Call light within reach.

## 2019-04-09 NOTE — PROGRESS NOTES
Received report from day RN and assumed care of patient at 1900.  Assessed patient and reviewed labs and notes.  Patient more alert and communicative than yesterday evening but still confused and crying out.  Patient medicated for pain overnight per MAR.  BP and resting HR remain elevated, consistent with baseline.  Patient afebrile overnight.  ÁNGEL drain to suction with 20 mL serosanguinous output overnight.  Patient incontinent of urine and bowel overnight.  Plan of care reviewed, patient board updated, safety precautions in place, and frequent rounding in practice.

## 2019-04-10 LAB
ANION GAP SERPL CALC-SCNC: 8 MMOL/L (ref 0–11.9)
BASOPHILS # BLD AUTO: 0.7 % (ref 0–1.8)
BASOPHILS # BLD: 0.03 K/UL (ref 0–0.12)
BUN SERPL-MCNC: 19 MG/DL (ref 8–22)
CALCIUM SERPL-MCNC: 10.3 MG/DL (ref 8.5–10.5)
CHLORIDE SERPL-SCNC: 101 MMOL/L (ref 96–112)
CO2 SERPL-SCNC: 24 MMOL/L (ref 20–33)
CREAT SERPL-MCNC: 0.65 MG/DL (ref 0.5–1.4)
EOSINOPHIL # BLD AUTO: 0.23 K/UL (ref 0–0.51)
EOSINOPHIL NFR BLD: 5 % (ref 0–6.9)
ERYTHROCYTE [DISTWIDTH] IN BLOOD BY AUTOMATED COUNT: 51.6 FL (ref 35.9–50)
GLUCOSE SERPL-MCNC: 85 MG/DL (ref 65–99)
HCT VFR BLD AUTO: 40.7 % (ref 37–47)
HGB BLD-MCNC: 12.3 G/DL (ref 12–16)
IMM GRANULOCYTES # BLD AUTO: 0.02 K/UL (ref 0–0.11)
IMM GRANULOCYTES NFR BLD AUTO: 0.4 % (ref 0–0.9)
LYMPHOCYTES # BLD AUTO: 0.86 K/UL (ref 1–4.8)
LYMPHOCYTES NFR BLD: 18.7 % (ref 22–41)
MCH RBC QN AUTO: 24.5 PG (ref 27–33)
MCHC RBC AUTO-ENTMCNC: 30.2 G/DL (ref 33.6–35)
MCV RBC AUTO: 80.9 FL (ref 81.4–97.8)
MONOCYTES # BLD AUTO: 0.9 K/UL (ref 0–0.85)
MONOCYTES NFR BLD AUTO: 19.6 % (ref 0–13.4)
NEUTROPHILS # BLD AUTO: 2.55 K/UL (ref 2–7.15)
NEUTROPHILS NFR BLD: 55.6 % (ref 44–72)
NRBC # BLD AUTO: 0 K/UL
NRBC BLD-RTO: 0 /100 WBC
PLATELET # BLD AUTO: 133 K/UL (ref 164–446)
PMV BLD AUTO: 10.2 FL (ref 9–12.9)
POTASSIUM SERPL-SCNC: 4.5 MMOL/L (ref 3.6–5.5)
RBC # BLD AUTO: 5.03 M/UL (ref 4.2–5.4)
SODIUM SERPL-SCNC: 133 MMOL/L (ref 135–145)
WBC # BLD AUTO: 4.6 K/UL (ref 4.8–10.8)

## 2019-04-10 PROCEDURE — 36415 COLL VENOUS BLD VENIPUNCTURE: CPT

## 2019-04-10 PROCEDURE — 700102 HCHG RX REV CODE 250 W/ 637 OVERRIDE(OP): Performed by: INTERNAL MEDICINE

## 2019-04-10 PROCEDURE — 700102 HCHG RX REV CODE 250 W/ 637 OVERRIDE(OP): Performed by: HOSPITALIST

## 2019-04-10 PROCEDURE — 700111 HCHG RX REV CODE 636 W/ 250 OVERRIDE (IP): Performed by: INTERNAL MEDICINE

## 2019-04-10 PROCEDURE — 99232 SBSQ HOSP IP/OBS MODERATE 35: CPT | Performed by: INTERNAL MEDICINE

## 2019-04-10 PROCEDURE — 97535 SELF CARE MNGMENT TRAINING: CPT

## 2019-04-10 PROCEDURE — A9270 NON-COVERED ITEM OR SERVICE: HCPCS | Performed by: HOSPITALIST

## 2019-04-10 PROCEDURE — 97112 NEUROMUSCULAR REEDUCATION: CPT

## 2019-04-10 PROCEDURE — 770004 HCHG ROOM/CARE - ONCOLOGY PRIVATE *

## 2019-04-10 PROCEDURE — 97530 THERAPEUTIC ACTIVITIES: CPT

## 2019-04-10 PROCEDURE — 700105 HCHG RX REV CODE 258: Performed by: INTERNAL MEDICINE

## 2019-04-10 PROCEDURE — 99233 SBSQ HOSP IP/OBS HIGH 50: CPT | Performed by: INTERNAL MEDICINE

## 2019-04-10 PROCEDURE — 80048 BASIC METABOLIC PNL TOTAL CA: CPT

## 2019-04-10 PROCEDURE — 85025 COMPLETE CBC W/AUTO DIFF WBC: CPT

## 2019-04-10 PROCEDURE — A9270 NON-COVERED ITEM OR SERVICE: HCPCS | Performed by: INTERNAL MEDICINE

## 2019-04-10 RX ADMIN — TRAMADOL HYDROCHLORIDE 50 MG: 50 TABLET, COATED ORAL at 18:30

## 2019-04-10 RX ADMIN — POTASSIUM CHLORIDE 40 MEQ: 1500 TABLET, EXTENDED RELEASE ORAL at 16:14

## 2019-04-10 RX ADMIN — VANCOMYCIN HYDROCHLORIDE 600 MG: 100 INJECTION, POWDER, LYOPHILIZED, FOR SOLUTION INTRAVENOUS at 21:47

## 2019-04-10 RX ADMIN — GABAPENTIN 300 MG: 300 CAPSULE ORAL at 16:13

## 2019-04-10 RX ADMIN — QUETIAPINE FUMARATE 25 MG: 25 TABLET ORAL at 21:47

## 2019-04-10 RX ADMIN — NYSTATIN: 100000 POWDER TOPICAL at 16:13

## 2019-04-10 RX ADMIN — MEROPENEM 2 G: 1 INJECTION, POWDER, FOR SOLUTION INTRAVENOUS at 08:42

## 2019-04-10 RX ADMIN — FUROSEMIDE 20 MG: 20 TABLET ORAL at 05:14

## 2019-04-10 RX ADMIN — QUETIAPINE FUMARATE 25 MG: 25 TABLET ORAL at 04:10

## 2019-04-10 RX ADMIN — ACETAMINOPHEN 650 MG: 325 TABLET, FILM COATED ORAL at 16:13

## 2019-04-10 RX ADMIN — GABAPENTIN 300 MG: 300 CAPSULE ORAL at 05:14

## 2019-04-10 RX ADMIN — NYSTATIN: 100000 POWDER TOPICAL at 05:14

## 2019-04-10 RX ADMIN — OMEPRAZOLE 20 MG: 20 CAPSULE, DELAYED RELEASE ORAL at 05:14

## 2019-04-10 RX ADMIN — MEROPENEM 2 G: 1 INJECTION, POWDER, FOR SOLUTION INTRAVENOUS at 21:46

## 2019-04-10 RX ADMIN — LOSARTAN POTASSIUM 50 MG: 50 TABLET ORAL at 05:14

## 2019-04-10 ASSESSMENT — ENCOUNTER SYMPTOMS
CHILLS: 0
VOMITING: 0
COUGH: 0
HEARTBURN: 0
WEAKNESS: 0
PHOTOPHOBIA: 0
INSOMNIA: 0
SHORTNESS OF BREATH: 0
DIARRHEA: 0
SENSORY CHANGE: 0
NERVOUS/ANXIOUS: 0
FEVER: 0
DEPRESSION: 0
CONSTIPATION: 0
SPEECH CHANGE: 0
BACK PAIN: 0
NAUSEA: 0
BLURRED VISION: 0
DIZZINESS: 0
CLAUDICATION: 0
SORE THROAT: 0
ABDOMINAL PAIN: 0
MYALGIAS: 1
HEADACHES: 0

## 2019-04-10 ASSESSMENT — COGNITIVE AND FUNCTIONAL STATUS - GENERAL
DRESSING REGULAR LOWER BODY CLOTHING: TOTAL
DAILY ACTIVITIY SCORE: 11
HELP NEEDED FOR BATHING: A LOT
EATING MEALS: TOTAL
PERSONAL GROOMING: A LOT
DRESSING REGULAR UPPER BODY CLOTHING: A LITTLE
SUGGESTED CMS G CODE MODIFIER DAILY ACTIVITY: CL
TOILETING: A LOT

## 2019-04-10 NOTE — DISCHARGE PLANNING
Agency/Facility Name: [facility]  Spoke To:   Outcome: Transferred call to [facility] with Admissions. No answer. Left voicemail to call [facility] back with update on referral.     Rufina CARLOS notified

## 2019-04-10 NOTE — CARE PLAN
Problem: Safety  Goal: Will remain free from injury  Outcome: PROGRESSING AS EXPECTED  Safety precautions in place. Bed in locked/low position. 2 side rails up. Treaded socks. BA on. Call light in reach, calls appropriately. Hourly rounding practiced.    Problem: Pain Management  Goal: Pain level will decrease to patient's comfort goal  Outcome: PROGRESSING AS EXPECTED  Pain managed with PRN Ultram and Oxycodone

## 2019-04-10 NOTE — PROGRESS NOTES
Pharmacy Kinetics 68 y.o. female on vancomycin day # 17 4/10/2019    Currently on Vancomycin 600 mg iv q24hr     Indication for Treatment: possible CNS infection     Pertinent history per medical record: Admitted on 3/8/2019 for iliopsoas mass and multiple brain lesions.  There is concern that this is infectious.  All cultures are negative to date, but were drawn while on pip/tazo. CHAS was negative.  Patient has a history of breast cancer and DM.  Wound was drained on 3/29 showing purulent discharge (cultures remain negative).  ID is following.  Plan for completion of abx (6 weeks) in house prior to transfer to [facility] in [place].  Iliopsoas fluid collection drained in IR on  again showing purulent discharge.     Other antibiotics: Meropenem 2 g IV q12h     Allergies: Patient has no known allergies.      List concerns for renal function: advanced age, BUN:Scr > 20     Pertinent cultures to date:   3/11/19 - wound (R gluteal muscle) x 2: Negative  3/12/19 - blood (peripheral) x 2: Negative  3/29/19 - wound (R buttock): x 2: Negative  3/31/19 - blood (peripheral) x 2: Negative  19 - retroperitoneal drain aspirate: Negative       Recent Labs      19   0013  19   0256  04/10/19   0006   WBC  3.3*  4.6*  4.6*   NEUTSPOLYS  61.90  63.50  55.60     Recent Labs      19   0013  19   0256  04/10/19   0006   BUN  23*  15  19   CREATININE  0.80  0.63  0.65     No results for input(s): VANCOTROUGH, VANCOPEAK, VANCORANDOM in the last 72 hours.  Intake/Output Summary (Last 24 hours) at 04/10/19 1022  Last data filed at 04/10/19 0400   Gross per 24 hour   Intake              480 ml   Output                0 ml   Net              480 ml      /80   Pulse (!) 115   Temp 36.8 °C (98.2 °F) (Temporal)   Resp 18   Ht 1.524 m (5')   Wt 62.5 kg (137 lb 12.6 oz)   SpO2 96%  Temp (24hrs), Av.7 °C (98.1 °F), Min:36.6 °C (97.9 °F), Max:36.8 °C (98.2 °F)      A/P   1. Vancomycin dose change: Continue  vancomycin 600 mg IV q24h   2. Next vancomycin level: 4/11/19 @ 1930 (ordered)  3. Goal trough: 16-20 mcg/mL   4. Comments: renal indices stable. Pt with noted episodes of tachycardia and now on 2L of O2. Pt is afebrile and WBC's are stable. Vancomycin and meropenem to continue for 6 weeks (anticipated stop date 4/24/19), likely followed by oral suppression therapy as the infection is adjacent to hardware. Trough to be obtained prior to tomorrow's dose.  Pharmacy will continue to monitor.     Joanna Lezama, PharmD, BCOP

## 2019-04-10 NOTE — THERAPY
"Occupational Therapy Treatment completed with focus on ADLs, ADL transfers, patient education and cognition.  Functional Status:  Pt was seen for Occupational Therapy treatment today, see Therapy Kardex for details.Treatment included education in breath control with activity and at rest, self pacing techs and energy conservation for pain management. Educated pt in safety awareness techs as well. Psychosocial intervention addressed.  Pt unable to feed self. CNA feeding pt upon entry to room .Will re-issue foam built up handles to assist with self feeding and maintaining a  on utensils and toothbrush. RN/CNA informed of foam handles and use.  Pt required Max A for supine to sit at EOB. Pt appears to be getting weaker. Pt stated the same as well, \"I think I'm getting weaker\". Mod A to stay seated upright at midline. Pt demonstrated Mod A with extended time for UB dressing, Total A  for LB dressing. Pt unable to lift legs up off floor to clear feet this OT session. Pt lethargic. Pt required Max A to comb hair for thoroughness and Mod A to brush teeth post set up using RUE. Pt quickly fatigues.Unable to stand this OT session due to lethargy and weakness in BLE's. Will monitor pt's medial status and ability to do self care tasks. Pt gave good effort but is getting weaker. Pt has brain mets . MD in for observation. Pt c/o back pain left lower area.Pt required Max A for BTB in supine. Pt was left up in bed, call light in reach and nursing is aware. RN updated on OT treatment findings and recommendations . Continue Occupational Therapy services as per plan.    Plan of Care: Will benefit from Occupational Therapy 3 times per week  Discharge Recommendations:  Equipment Will Continue to Assess for Equipment Needs. Post-acute therapy Discharge to a transitional care facility for continued skilled therapy services.    See \"Rehab Therapy-Acute\" Patient Summary Report for complete documentation.   "

## 2019-04-10 NOTE — DISCHARGE PLANNING
Received Choice form at 0961 from Rufina CARLOS  Agency/Facility Name: [facility] ([place])   Referral sent per Choice form @ [facility] at 0906.

## 2019-04-10 NOTE — PROGRESS NOTES
Infectious Disease Progress Note    Author: Lito Burrows M.D. Date & Time of service: 4/10/2019  10:11 AM    Chief Complaint:  Multiple brain lesions  Iliopsoas lesion    Interval History:  68-year-old female with history of diabetes and breast cancer, admitted 3/8/2019 with abdominal pain, iliopsoas lesion, and an abnormal MRI with multiple brain lesions.  3/14 AF (99.2) WBC 5.8 more awake today as compared to yesterday but not oriented-denies pain, following commands. ECHO reviewed  3/15 AF (99.1) no WBC today Had CHAS today  3/16 temp 100.5 WBC 7.9 more confused today-naked but pushing off sheets  3/17 AF WBC not done Less obtunded today-not oriented. Denies pain, N/V, HA, visual changes etc  3/18 afebrile WBC 6 patient sitting up in bed eating breakfast, she is quite soft spoken.  She denies any headache, nausea or vomiting.  Endorses a mild cough.  3/19 AF pt denies any new symptoms, no HA. Brain lesions too small for biopsy per notes. Neurosurgery recommends repeating scan in 2 weeks.  3/20 afebrile patient is sleeping and difficult to arouse  3/21 patient is sitting up in bed and is alert and awake.  She however appears to have some waxing confusion but no she is in the hospital in [place].  Complains of lower back pain but no headache, nausea or vomiting  3/22 AF no CBC, continues to have bowel incontinence but not watery per RN, pt has no new complaints today, not engaged in conversation  3/23 afebrile WBC 6.3 patient complaining of stool incontinence and diarrhea, midline placed yesterday  3/24 afebrile patient rested comfortably without any new complaints  3/25 T- max 98.5, no new labs since 03/23. Reports mild HA and weakness. Emotional when asked about hx of hip fx.  3/26 afebrile, wbc 8.0. Reports feeling agitated. Per RN appeared confused throughout the night  3/27 no change overnight. RN reports agitation overnight. Pt sleeping, difficult to wake this am  3/28 reports slight improvement from  yesterday. Moving LE easier after abscess drained by primary team.   3/29/2019 no fevers.  WBC is down to 3.4.  Underwent drainage from the right gluteal area.  It was purulent.  3/30/2019-no fevers.  Remains slightly confused.  WBC is 3.9  3/31/2019 had fevers up to 102.6.  Complains of some right hand weakness.  No new issues overnight.  Family at bedside.  2019 no fevers.  Easily arousable.  ÁNGEL continues to drain.  2019-no fevers.  Awake and responsive but feels weak.  There is minimal drainage from the ÁNGEL.  As per the nursing the patient is incontinent of stool.  But when you talk to her she states she is just is not able to get up on time.  4/3/2019-complains of some weakness in the hand. No fevers. The drain is not draining much  - no fevers. She is anxious since she has been NPO. Awaiting IR drain   no fevers.  Got her drain.  There was purulent fluid.  WBCs 4.6.   afebrile, white count 3.3.  IR drain remains in place.  Continues to have significant word finding difficulty.   per RN, patient more awake and interactive but remains confused with word finding difficulty.  Resting comfortably this morning  4/10 afebrile, white count 4.6.  Patient much more awake and interactive today.  Reports no new issues, reports no pain anywhere.  IR drain has been removed.    Labs Reviewed    Review of Systems:  Review of Systems   Unable to perform ROS: Other   Limited review of systems secondary to intermittent confusion and word finding difficulty    Hemodynamics:  Temp (24hrs), Av.7 °C (98.1 °F), Min:36.6 °C (97.9 °F), Max:36.8 °C (98.2 °F)  Temperature: 36.8 °C (98.2 °F)  Pulse  Av.2  Min: 70  Max: 153  Blood Pressure : 121/80       Physical Exam:  Physical Exam   Constitutional: She appears well-developed and well-nourished. No distress.   More awake and alert compared to yesterday   HENT:   Head: Normocephalic and atraumatic.   Dry mucous membranes   Eyes: Pupils are equal, round, and  reactive to light. Right eye exhibits no discharge. Left eye exhibits no discharge.   Neck: Neck supple.   Cardiovascular: Normal rate and intact distal pulses.    Murmur heard.  Pulmonary/Chest: Effort normal and breath sounds normal. No respiratory distress. She has no wheezes.   Abdominal: Soft. Bowel sounds are normal. She exhibits no distension. There is no tenderness.   IR drain now removed, clean and dry dressing   Musculoskeletal: She exhibits no edema, tenderness or deformity.   Lymphadenopathy:     She has no cervical adenopathy.   Neurological: She is alert.   Word finding difficulty -much improved  Appears to have global weakness   Skin: Skin is warm. No rash noted. She is not diaphoretic.   Nursing note and vitals reviewed.    Meds:    Current Facility-Administered Medications:   •  meropenem  •  QUEtiapine  •  furosemide  •  vancomycin  •  potassium chloride SA  •  loperamide  •  MD Alert...Vancomycin per Pharmacy  •  nystatin  •  labetalol  •  ibuprofen  •  haloperidol lactate  •  pramipexole  •  tramadol  •  losartan  •  gabapentin  •  omeprazole  •  Respiratory Care per Protocol  •  acetaminophen  •  Notify provider if pain remains uncontrolled **AND** Use the numeric rating scale (NRS-11) on regular floors and Critical-Care Pain Observation Tool (CPOT) on ICUs/Trauma to assess pain **AND** Pulse Ox (Oximetry) **AND** Pharmacy Consult Request **AND** If patient difficult to arouse and/or has respiratory depression, stop any opiates that are currently infusing and call a Rapid Response. **AND** oxyCODONE immediate-release **AND** [DISCONTINUED] oxyCODONE immediate-release **AND** morphine injection  •  ondansetron  •  ondansetron    Labs:  Recent Labs      04/08/19   0013  04/09/19   0256  04/10/19   0006   WBC  3.3*  4.6*  4.6*   RBC  4.79  4.62  5.03   HEMOGLOBIN  12.1  11.5*  12.3   HEMATOCRIT  38.5  37.0  40.7   MCV  80.4*  80.1*  80.9*   MCH  25.3*  24.9*  24.5*   RDW  50.4*  50.5*  51.6*    PLATELETCT  126*  155*  133*   MPV  9.9  10.5  10.2   NEUTSPOLYS  61.90  63.50  55.60   LYMPHOCYTES  11.40*  15.10*  18.70*   MONOCYTES  15.90*  15.60*  19.60*   EOSINOPHILS  9.90*  4.80  5.00   BASOPHILS  0.30  0.60  0.70     Recent Labs      04/08/19   0013  04/09/19   0256  04/10/19   0006   SODIUM  133*  132*  133*   POTASSIUM  3.9  4.2  4.5   CHLORIDE  100  101  101   CO2  25  25  24   GLUCOSE  95  89  85   BUN  23*  15  19     Recent Labs      04/08/19   0013  04/09/19   0256  04/10/19   0006   CREATININE  0.80  0.63  0.65       Imaging:  Ct-needle Bx-muscle Right    Result Date: 3/13/2019  3/11/2019 4:44 PM HISTORY/REASON FOR EXAM:  Right gluteal hypodense lesion. Moderate sedation was administered with continuous monitoring of the patient under the direct supervision of  Medications: 2 mg of Versed and 200 mcg of fentanyl Total sedation time 60 minutes Procedure: After the informed consent the patient was placed on the CT table on prone position. Localization CT scan was obtained. It demonstrates hypodense area in the right gluteus muscle. Subsequently a 17-gauge needle was advanced into the lesion. 20  mL of pus was aspirated. The materials were sent to the pathology. The patient tolerated the procedure without any immediate competition.     CT-guided aspiration of pus like material in the right gluteal muscle lesion.    Ec-echocardiogram Complete W/o Cont    Result Date: 3/13/2019  Transthoracic Echo Report Echocardiography Laboratory CONCLUSIONS No prior study is available for comparison. Normal left ventricular systolic function. Left ventricular ejection fraction is visually estimated to be 65%. Normal diastolic function. Normal inferior vena cava size and inspiratory collapse. Aortic sclerosis without stenosis. Estimated right ventricular systolic pressure  is 33 mmHg. No obvious valvular vegetations are noted on a decent quality study.  If further valvular assessment is clinically  "indicated, consider transesophageal echocardiogram. SAM,  LV EF:  65    % FINDINGS Left Ventricle Normal left ventricular size and systolic function. Normal left ventricular wall thickness. Left ventricular ejection fraction is visually estimated to be 65%. Normal diastolic function. Right Ventricle Normal right ventricular size and systolic function. Right Atrium Normal right atrial size. Normal inferior vena cava size and inspiratory collapse. Left Atrium Normal left atrial size. Mitral Valve Structurally normal mitral valve without significant stenosis or regurgitation. Aortic Valve Aortic sclerosis without stenosis. No aortic insufficiency. Tricuspid Valve Structurally normal tricuspid valve. Mild tricuspid regurgitation. Right atrial pressure is estimated to be 3 mmHg. Estimated right ventricular systolic pressure  is 33 mmHg. Pulmonic Valve The pulmonic valve is not well visualized. No pulmonic insufficiency. Pericardium Normal pericardium without effusion. Aorta The aortic root is normal.  Ascending aorta diameter is 3.0 cm. Claire Tripathi MD (Electronically Signed) Final Date:     13 March 2019                 14:38      Micro:  Results     Procedure Component Value Units Date/Time    CULTURE WOUND W/ GRAM STAIN [[id number]] Collected:  04/04/19 1931    Order Status:  Completed Specimen:  Wound Updated:  04/07/19 0731     Significant Indicator NEG     Source WND     Site Retroperitoneal Drain Aspirate     Culture Result Wound No growth at 72 hours     Gram Stain Result Many WBCs.  No organisms seen.      BLOOD CULTURE [[id number]] Collected:  03/31/19 1504    Order Status:  Completed Specimen:  Blood from Peripheral Updated:  04/05/19 1900     Significant Indicator NEG     Source BLD     Site PERIPHERAL     Blood Culture No growth after 5 days of incubation.    Narrative:       Per Hospital Policy: Only change Specimen Src: to \"Line\" if  specified by physician order.    BLOOD CULTURE [[id number]] Collected:  " "03/31/19 1504    Order Status:  Completed Specimen:  Blood from Peripheral Updated:  04/05/19 1900     Significant Indicator NEG     Source BLD     Site PERIPHERAL     Blood Culture No growth after 5 days of incubation.    Narrative:       Per Hospital Policy: Only change Specimen Src: to \"Line\" if  specified by physician order.    GRAM STAIN [[id number]] Collected:  04/04/19 1931    Order Status:  Completed Specimen:  Wound Updated:  04/05/19 0832     Significant Indicator .     Source WND     Site Retroperitoneal Drain Aspirate     Gram Stain Result Many WBCs.  No organisms seen.      FLUID CULTURE W/GRAM STAIN [[id number]]     Order Status:  No result Specimen:  Body Fluid from Peritoneal Fluid           Assessment:  Active Hospital Problems    Diagnosis   • *Lesion of brain [G93.9]   • Mass of iliopsoas muscle group [M62.89]   • Encephalopathy [G93.40]   •    • History of breast cancer [Z85.3]   • Diabetes mellitus type 2 in obese (HCC) [E11.69, E66.9]       Plan:  Multiple brain lesions-likely abscesses versus metastatic lesions  Low-grade fevers resolved  Wbc 4.5  Confusion improving  MRI with scattered small lesions incl aaron, too small to biopsy per neurosurgery  TTE neg; CHAS neg  Bcxs neg to date  Repeat CT scan on 3/30/2019 shows decrease in the size of the brain lesions, supporting that these are abscesses  Repeat MRI brain prior to completion of abx    Iliopsoas abscess, on treatment  CT + 2.6 cm lesion in the right iliopsoas region  S/p aspiration +WBCs-cultures negative to date  Bcxs remain neg  D/c'd Vanco secondary to elevated Vanco troughs 03/23 and patient not clearing well, restarted 03/25  Last vanc trough of 20.4 and 4/5  D/c cefepime 03/26 - may be contributing to confusion  Tolerating Meropenem started 03/26,  Midline pending  MRI w/ contrast lumbar, pelvis 03/27 - Iliopsoas and gluteal fluid collections noted.    Drain placed 3/29/2019  Cultures are negative so far  Repeat CT scan shows " resolution of the abscess in the gluteus but increase in the iliopsoas  Another drain was placed on 4/4/2019 and it shows purulent fluid  Repeat CT from 4/8 with resolution of the fluid collections after drainage per my read  Aim  for at least 6 weeks of IV antibiotics (end date: 4/24/2018) likely followed by p.o. suppression given that the infection is adjacent to hardware  Consider repeat CHAS at some point given her multiple brain lesions suspicious for infective endocarditis  Continue broad-spectrum IV vancomycin and meropenem for now.  Not attempting de-escalation at this time since we have no positive cultures and given the consequences of worsening brain abscesses    H/o breast cancer  Markedly elevated alk phos, trending down  Continued diaz for mets    Type 2 diabetes mellitus  Hemoglobin A1c 6.3  Keep BS under 150 to help control current infection    Altered mental status  Appears to be waxing and waning  Multifactorial, likely large contribution from her multiple brain abscesses, monitor    I have performed a physical exam and reviewed and updated ROS and plan today 4/10/2019.  In review of yesterday's note 4/9/2019, there are no changes except as documented above.    Discussed with Dr. Keller, ID will follow.  Please call with questions.

## 2019-04-10 NOTE — PROGRESS NOTES
"[facility] Medicine Daily Progress Note    Date of Service  4/10/2019    Chief Complaint  68 y.o. female admitted 3/8/2019 with right hip pain, fever and abnormal brain mri.    Hospital Course    Patient started on empiric antibiotics given fever.  She had abnormal findings on MRI brain and CT showed \"lesion\" on right iliopsoas.  This lesion was biopsied and turned out to be abscess.  She has history of breast cancer and there is also concern of brain lesions being metastatic though their appearance is not typical of this.      Interval Problem Update  3/12 Discussed with Dr Turner for second opinion of brain lesions and based on appearance not able to r/o or r/i any diagnosis.  Given patient's presentation of confusion, hip pain and fever  - this is more supportive of infectious etiology rather than malignant.  Fluid from right gluteal area sent to micro and as of yet - no growth.  Continue zosyn for now.  3/13 Patient with slight improvement in mentation.  Blood cultures and abscess cultures are showing no growth at this time.  TTE being done while I examined patient - no evidence of vegetations on this test.  ID consultation pending - d/w Dr Garcia.  3/14 Patient feeling well today, her pain is present but not as bad as prior to admission.  She was able to follow the conversation today and is agreeable to move forward with the CHAS tomorrow.  I spoke with her brother, René, and updated him on condition yesterday afternoon also.  Will also have PICC placed in next few days as long as blood cultures remain negative as I expect a prolonged antibiotic course of treatment.  3/15 Patient without new complaints, states she needs help to move in bed.  CHAS showed no evidence of vegetations and regular diet resumed.  Culture remains negative and biopsy of brain lesion may prove needed - will watch through the weekend and if mentation does not continue to improve, will discuss with neurosurgery on Monday.  3/16 Patient states " she is okay today, mentation has waxed and waned without significant improvement.  She was febrile earlier today.  No other acute events.  3/17 Patient with significant improvement in mentation today, she is aware of her hospitalization, not falling asleep mid sentence.  Her pain mediations were held most of yesterday and likely far too strong for patient and were affecting her awareness.  Oxycodone 5 discontinued and will use tramadol instead unless pain not well controlled.  CT brain with contrast ordered as a quick scan for comparison to MRI.    3/18 Patient feeling same today, discussed need to discuss with neurosurgery for possible biopsy.  Discussed with Dr Nguyen, he reviewed MRI and CT brain and he feels they are likely metastatic lesions but are far too small to biopsy.  His recommendation is to continue with antibiotics and re-image in 2 weeks to see if any change that would be amenable to biopsy or that would further declare infection.  3/26 Patient mental status continues to wax and wane.  ID ordered MRI lumbar, pelvis and sacrum for evaluation for source of infection, patient with continued low back pain and no real improvement since I saw her 7 days prior with continued antibiotics during this time.  Cultures have not growth pathogen, repeat MRI 3/28.   3/27 Patient somnolent most of the day today, MRI's completed and showing 2 areas of fluid collections, given her history are likely abscesses and will require drainage, CT guided drainage requested and patient NPO at [facility] for this.  D/w ID - cultures will be done on fluid collection.  Repeat CT brain ordered for tomorrow.  3/28 Patient up to chair, diet resumed as lovenox given this am and drainage of abscesses deferred until tomorrow.  Vanco/merrem to continue and cultures will be collected from abscesses.  3/29 Patient went to IR today, had drain placed in the right buttock into abscess cavity and fluid sent for culture.  Potassium is slightly low,  continue with PO replacement.  HR has improved from yesterday but patient PO intake still not at normal level, increase IVF rate to 100cc/hour.  3/30 Patient without fever since drain placed right buttock.  Purulent material in ÁNGEL bulb. Cultures thus far are still showing no growth.  Antibiotics to continue.  CT head ordered to evaluate change in past 2 weeks as recommended by neurosurgery.  3/31 Patient with fevers overnight - was altered during this time but has recovered.  She denies pain when examined by me.  She still has purulent material in the drainage tubing.  Will continue with current IV antibiotics - 6 weeks total antibiotics suspected - end date would be 4/24/19.  D/w ID.  4/1 Patient remains intermittently somnolent.  She wakes easily but falls back to sleep quickly.  She remained afebrile overnight.  ÁNGEL drain continues to drain purulent material.  ABX through 4/24 - once accepting facility found, patient okay to transfer with midline intact for completion of antibiotics.    4/9 Patient somnolent when examined.  CT showed resolution of fluid collection where ÁNGEL in place.  RN to remove ÁNGEL today.  All cultures still remain without growth.  4/10 Patient seen when working with OT, she is awake but gets off task quickly and is easily fatigued.  ÁNGEL drain removed yesterday.  Patient states she has pain in the lumbar spine when she coughs - just above her surgical area.  She has been in bed for nearly 1 month and this is likely not helping her back pain - educated need for OOB as often as tolerated.    Consultants/Specialty  ID - [facility]    Code Status  full    Disposition  SNF/[facility] in [place]    Review of Systems  Review of Systems   Constitutional: Negative for chills and fever.   HENT: Negative for congestion and sore throat.    Eyes: Negative for blurred vision and photophobia.   Respiratory: Negative for cough and shortness of breath.    Cardiovascular: Negative for chest pain, claudication and leg  swelling.   Gastrointestinal: Negative for abdominal pain, constipation, diarrhea, heartburn, nausea and vomiting.   Genitourinary: Negative for dysuria and hematuria.   Musculoskeletal: Positive for myalgias. Negative for back pain and joint pain.   Skin: Negative for itching and rash.   Neurological: Negative for dizziness, sensory change, speech change, weakness and headaches.   Psychiatric/Behavioral: Negative for depression. The patient is not nervous/anxious and does not have insomnia.         Physical Exam  Temp:  [36.6 °C (97.9 °F)-36.8 °C (98.2 °F)] 36.8 °C (98.2 °F)  Pulse:  [101-118] 115  Resp:  [18-20] 18  BP: (121-178)/(80-84) 121/80  SpO2:  [93 %-99 %] 96 %    Physical Exam   Constitutional: She is oriented to person, place, and time. She appears well-developed and well-nourished. No distress.   HENT:   Head: Normocephalic and atraumatic.   Eyes: Conjunctivae are normal. No scleral icterus.   Neck: Neck supple. No JVD present.   Cardiovascular: Normal rate, regular rhythm and normal heart sounds.  Exam reveals no gallop and no friction rub.    No murmur heard.  Pulmonary/Chest: Effort normal and breath sounds normal. No respiratory distress. She exhibits no tenderness.   Abdominal: Soft. Bowel sounds are normal. She exhibits no distension. There is no guarding.   Musculoskeletal: She exhibits no edema or tenderness.   ÁNGEL drain removed       Lymphadenopathy:     She has no cervical adenopathy.   Neurological: She is alert and oriented to person, place, and time. No cranial nerve deficit.   Mentation labile     Skin: Skin is warm and dry. She is not diaphoretic. No erythema. No pallor.   Psychiatric: She has a normal mood and affect. Her behavior is normal.   Nursing note and vitals reviewed.      Fluids    Intake/Output Summary (Last 24 hours) at 04/10/19 1409  Last data filed at 04/10/19 1000   Gross per 24 hour   Intake              716 ml   Output                0 ml   Net              716 ml        Laboratory  Recent Labs      04/08/19   0013  04/09/19   0256  04/10/19   0006   WBC  3.3*  4.6*  4.6*   RBC  4.79  4.62  5.03   HEMOGLOBIN  12.1  11.5*  12.3   HEMATOCRIT  38.5  37.0  40.7   MCV  80.4*  80.1*  80.9*   MCH  25.3*  24.9*  24.5*   MCHC  31.4*  31.1*  30.2*   RDW  50.4*  50.5*  51.6*   PLATELETCT  126*  155*  133*   MPV  9.9  10.5  10.2     Recent Labs      04/08/19   0013  04/09/19   0256  04/10/19   0006   SODIUM  133*  132*  133*   POTASSIUM  3.9  4.2  4.5   CHLORIDE  100  101  101   CO2  25  25  24   GLUCOSE  95  89  85   BUN  23*  15  19   CREATININE  0.80  0.63  0.65   CALCIUM  10.7*  10.5  10.3                   Imaging  CT-NEEDLE BX-MUSCLE RIGHT   Final Result   Addendum 1 of 1   Addendum:   The biopsy/drainage was done utilizing low dose optimization CT techniques    including auto modulation for  imaging and low mA CT/fluoroscopy mode    for the procedure.      Final      CT-guided aspiration of pus like material in the right gluteal muscle lesion.      CT-ABDOMEN-PELVIS WITH   Final Result      1.  Resolution of right iliacus abscess with no associated fluid adjacent to the drainage catheter      2.  Interval removal of right gluteal drainage catheter      3.  Surgical screw traversing both sacroiliac joint with associated pathologic fracture of the right ilium and right medial sacrum      4.  Hepatomegaly      CT-DRAIN-HEMATOMA - SEROMA   Final Result      1.  CT-GUIDED RETROPERITONEAL (EXTRAPERITONEAL) RIGHT PELVIC ABSCESS DRAINAGE AT THE RIGHT ILIOPSOAS. ORDERS WERE WRITTEN FOR ONCE DAILY IRRIGATION OF THE CATHETER WITH 5 ML STERILE SALINE.      2.  THE CURRENT PLAN IS TO MONITOR DRAINAGE OUTPUT AND OBTAIN A FOLLOWUP CT SCAN IN 5-7 DAYS IF CLINICALLY INDICATED.      CT-ABDOMEN-PELVIS WITH   Final Result      1.  Rim-enhancing fluid collection in the right iliopsoas muscle may have increased in size slightly from the prior exam.      2.  Pigtail catheter in the right gluteus  medius muscle. No residual fluid collection is appreciated.      3.  Pathologic fracture of the right ilium. Status post right SI joint fusion.      4.  Atherosclerosis.      5.  Cholelithiasis.      CT-HEAD WITH & W/O   Final Result      1.  Interval decrease in size and level of enhancement of multiple small punctate foci in both cerebral hemispheres and in the midbrain consistent with improving septic emboli.      2.  No hemorrhage or mass effect.      CT-DRAIN-RETROPERITONEAL   Final Result      1.  CT GUIDED CATHETER DRAINAGE OF RIGHT GLUTEAL ABSCESS.   2.  THE CURRENT PLAN IS TO OBTAIN A FOLLOW-UP CT SCAN IN 5-7 DAYS IF INDICATED. ORDERS WERE WRITTEN FOR ONCE DAILY IRRIGATION OF THE CATHETER WITH 5 ML STERILE SALINE FOR 3 DAYS.   3. THE ILIOPSOAS COLLECTION WAS NOT AMENABLE TO CATHETER DRAINAGE AS INTERVENING BOWEL AND/OR BONY STRUCTURES OBSTRUCT A PATHWAY TO THIS COLLECTION AT THIS TIME.      MR-LUMBAR SPINE-WITH & W/O   Final Result      1.  There are multifocal enhancing fluid collections in the right iliopsoas muscles and gluteus muscles adjacent to the right ilium. Right iliopsoas fluid collection measures an approximately 3.9 x 2.9 cm. The right gluteal fluid collection measures an    approximately 3.5 x 2.8 cm in size. The differential diagnosis includes postsurgical seroma and abscess.   2.  Post operative and degenerative changes as described above.      MR-PELVIS-WITH & W/O AND SEQUENCES   Final Result      1.  Surgical screw traverses both sacroiliac joint across presumed pathologic fracture of the right sacrum and the hardware obscures the adjacent tissues.      2.  No other evidence for bony metastatic disease      3.  Right gluteal musculature rim-enhancing fluid collection measuring 3.4 x 2.8 cm. This could be a postoperative seroma versus abscess      4.  osteoarthritis of both hip joint      5.  Prior hysterectomy      IR-MIDLINE CATHETER INSERTION WO GUIDANCE > AGE 3   Final Result                   Ultrasound-guided midline placement performed by qualified nursing staff    as above.          CT-HEAD WITH   Final Result      Multiple subcentimeter too numerous to count enhancing masses scattered throughout the supratentorial and infratentorial brain. These demonstrate some surrounding vasogenic edema and most likely represent multifocal metastatic lesions. Other    considerations would include multifocal abscesses or septic emboli.      EC-CHAS W/O CONT   Final Result      EC-CAHS W/O CONT   Final Result      EC-ECHOCARDIOGRAM COMPLETE W/O CONT   Final Result      OUTSIDE IMAGES-CT CHEST/ABDOMEN/PELVIS   Final Result      OUTSIDE IMAGES-DX CHEST   Final Result      NG-OUCPPKS-TAGICNY FILM X-RAY   Final Result      OUTSIDE IMAGES-MR BRAIN   Final Result      OUTSIDE IMAGES-MR BRAIN   Final Result           Assessment/Plan  * Lesion of brain   Assessment & Plan    Metastatic cancer vs infectious etiology  D/w Dr Black for questionable brain mets, recommending IR Bx of psoas muscle   IR Bx of psoas muscle ordered - was abscess  Empiric treatment of infection  TTE and CHAS negative for vegetations.  D/w Dr Nguyen - lesions likely metastatic based on his interpretation of MRI/CT - too small to biopsy, recommends continuing antibiotics and re-imaging in 2 weeks to see if there has been any change.  CT brain to be repeated - lesions shrinking and decreased in overall number - c/w septic emboli       Encephalopathy   Assessment & Plan    Likely toxic metabolic from infection  Waxes and wanes       Mass of iliopsoas muscle group   Assessment & Plan    Return of fluid collection, repeat drainage in IR with cultures, drain placed 3/29 - 10ml purulent material drained and sent for culture - no growth a/o 3/30  Was on linezolid and cefepime and now on vanco/merrem - end date to be 6 weeks of treatment. (4/24/19)  ID consulting   Repeat CT abdomen shows resolution of fluid collection and ÁNGEL output dwindling - will have RN  remove ÁNGEL drain         Normocytic anemia   Assessment & Plan    stable     Hyponatremia- (present on admission)   Assessment & Plan    Mildly low - doubt contribution to mentation         RLS (restless legs syndrome)- (present on admission)   Assessment & Plan    Pramipexole       Diabetes mellitus type 2 in obese (HCC)- (present on admission)   Assessment & Plan    Well controlled   Short acting insulin as needed   Accu-Checks, hypoglycemia protocol          History of breast cancer- (present on admission)   Assessment & Plan    infection to brain  No known active breast cancer.       COPD (chronic obstructive pulmonary disease) (HCC)- (present on admission)   Assessment & Plan    Oxygen as needed, Respiratory protocol, Bronchodilators, Incentive spirometry      Urinary tract infection   Assessment & Plan    Culture remain no growth.       Fungal infection of the groin   Assessment & Plan    on nystatin powder 3/24     History of deep vein thrombosis- (present on admission)   Assessment & Plan    History of deep vein thrombosis   Was on Rivaroxaban as outpatient.     Held for Bx Mar 11  Restart AC after completing abx treatment         Essential hypertension- (present on admission)   Assessment & Plan    Restarted on losartan and furosemide with hold parameters.     GERD (gastroesophageal reflux disease)- (present on admission)   Assessment & Plan    Omeprazole     Chronic pain- (present on admission)   Assessment & Plan     Multifactorial  Likely secondary to abscess, fracture  Pain control            VTE prophylaxis: scds

## 2019-04-10 NOTE — PROGRESS NOTES
/84   Pulse (!) 105   Temp 36.6 °C (97.9 °F) (Temporal)   Resp 18   Ht 1.524 m (5')   Wt 62.5 kg (137 lb 12.6 oz)   SpO2 93%   Breastfeeding? No   BMI 26.91 kg/m²     Patient is A&Ox3, disoriented to time.   Reports back pain, medicated per MAR  Generalized weakness. HERNANDEZ, CMS intact, denies numbness and tingling.   Mobilizes with x 2 to max assistance, pt does not call at times, bed alarm on.   On RA, denies SOB or chest pain.   Normoactive BS x 4. Tolerating diet. Denies nausea/vomiting.   Positive flatus, LBM 4/9  Positive void   Previous ÁNGEL site to abdomen has a dressing, clean/dry/intact  RUE midline running TKO for IV abx   Updated on POC. Belongings and call light within reach. All needs met at this time.

## 2019-04-10 NOTE — PROGRESS NOTES
"[facility] Medicine Daily Progress Note    Date of Service  4/9/2019    Chief Complaint  68 y.o. female admitted 3/8/2019 with right hip pain, fever and abnormal brain mri.    Hospital Course    Patient started on empiric antibiotics given fever.  She had abnormal findings on MRI brain and CT showed \"lesion\" on right iliopsoas.  This lesion was biopsied and turned out to be abscess.  She has history of breast cancer and there is also concern of brain lesions being metastatic though their appearance is not typical of this.      Interval Problem Update  3/12 Discussed with Dr Turner for second opinion of brain lesions and based on appearance not able to r/o or r/i any diagnosis.  Given patient's presentation of confusion, hip pain and fever  - this is more supportive of infectious etiology rather than malignant.  Fluid from right gluteal area sent to micro and as of yet - no growth.  Continue zosyn for now.  3/13 Patient with slight improvement in mentation.  Blood cultures and abscess cultures are showing no growth at this time.  TTE being done while I examined patient - no evidence of vegetations on this test.  ID consultation pending - d/w Dr Garcia.  3/14 Patient feeling well today, her pain is present but not as bad as prior to admission.  She was able to follow the conversation today and is agreeable to move forward with the CHAS tomorrow.  I spoke with her brother, René, and updated him on condition yesterday afternoon also.  Will also have PICC placed in next few days as long as blood cultures remain negative as I expect a prolonged antibiotic course of treatment.  3/15 Patient without new complaints, states she needs help to move in bed.  CHAS showed no evidence of vegetations and regular diet resumed.  Culture remains negative and biopsy of brain lesion may prove needed - will watch through the weekend and if mentation does not continue to improve, will discuss with neurosurgery on Monday.  3/16 Patient states she " is okay today, mentation has waxed and waned without significant improvement.  She was febrile earlier today.  No other acute events.  3/17 Patient with significant improvement in mentation today, she is aware of her hospitalization, not falling asleep mid sentence.  Her pain mediations were held most of yesterday and likely far too strong for patient and were affecting her awareness.  Oxycodone 5 discontinued and will use tramadol instead unless pain not well controlled.  CT brain with contrast ordered as a quick scan for comparison to MRI.    3/18 Patient feeling same today, discussed need to discuss with neurosurgery for possible biopsy.  Discussed with Dr Nguyen, he reviewed MRI and CT brain and he feels they are likely metastatic lesions but are far too small to biopsy.  His recommendation is to continue with antibiotics and re-image in 2 weeks to see if any change that would be amenable to biopsy or that would further declare infection.  3/26 Patient mental status continues to wax and wane.  ID ordered MRI lumbar, pelvis and sacrum for evaluation for source of infection, patient with continued low back pain and no real improvement since I saw her 7 days prior with continued antibiotics during this time.  Cultures have not growth pathogen, repeat MRI 3/28.   3/27 Patient somnolent most of the day today, MRI's completed and showing 2 areas of fluid collections, given her history are likely abscesses and will require drainage, CT guided drainage requested and patient NPO at [facility] for this.  D/w ID - cultures will be done on fluid collection.  Repeat CT brain ordered for tomorrow.  3/28 Patient up to chair, diet resumed as lovenox given this am and drainage of abscesses deferred until tomorrow.  Vanco/merrem to continue and cultures will be collected from abscesses.  3/29 Patient went to IR today, had drain placed in the right buttock into abscess cavity and fluid sent for culture.  Potassium is slightly low,  continue with PO replacement.  HR has improved from yesterday but patient PO intake still not at normal level, increase IVF rate to 100cc/hour.  3/30 Patient without fever since drain placed right buttock.  Purulent material in ÁNGEL bulb. Cultures thus far are still showing no growth.  Antibiotics to continue.  CT head ordered to evaluate change in past 2 weeks as recommended by neurosurgery.  3/31 Patient with fevers overnight - was altered during this time but has recovered.  She denies pain when examined by me.  She still has purulent material in the drainage tubing.  Will continue with current IV antibiotics - 6 weeks total antibiotics suspected - end date would be 4/24/19.  D/w ID.  4/1 Patient remains intermittently somnolent.  She wakes easily but falls back to sleep quickly.  She remained afebrile overnight.  ÁNGEL drain continues to drain purulent material.  ABX through 4/24 - once accepting facility found, patient okay to transfer with midline intact for completion of antibiotics.    4/9 Patient somnolent when examined.  CT showed resolution of fluid collection where ÁNGEL in place.  RN to remove ÁNEGL today.  All cultures still remain without growth.    Consultants/Specialty  ID - santino/Alex    Code Status  full    Disposition  SNF/LTAC in [place]    Review of Systems  Review of Systems   Constitutional: Negative for chills and fever.   HENT: Negative for congestion and sore throat.    Eyes: Negative for blurred vision and photophobia.   Respiratory: Negative for cough and shortness of breath.    Cardiovascular: Negative for chest pain, claudication and leg swelling.   Gastrointestinal: Negative for abdominal pain, constipation, diarrhea, heartburn, nausea and vomiting.   Genitourinary: Negative for dysuria and hematuria.   Musculoskeletal: Positive for myalgias. Negative for back pain and joint pain.   Skin: Negative for itching and rash.   Neurological: Negative for dizziness, sensory change, speech change,  weakness and headaches.   Psychiatric/Behavioral: Negative for depression. The patient is not nervous/anxious and does not have insomnia.         Physical Exam  Temp:  [36.2 °C (97.1 °F)-37.1 °C (98.8 °F)] 36.8 °C (98.2 °F)  Pulse:  [101-111] 101  Resp:  [18-20] 20  BP: (119-170)/() 143/81  SpO2:  [92 %-99 %] 99 %    Physical Exam   Constitutional: She is oriented to person, place, and time. She appears well-developed and well-nourished. No distress.   HENT:   Head: Normocephalic and atraumatic.   Eyes: Conjunctivae are normal. No scleral icterus.   Neck: Neck supple. No JVD present.   Cardiovascular: Normal rate, regular rhythm and normal heart sounds.  Exam reveals no gallop and no friction rub.    No murmur heard.  Pulmonary/Chest: Effort normal and breath sounds normal. No respiratory distress. She exhibits no tenderness.   Abdominal: Soft. Bowel sounds are normal. She exhibits no distension. There is no guarding.   Musculoskeletal: She exhibits no edema or tenderness.   ÁNGEL drain right hip.     Lymphadenopathy:     She has no cervical adenopathy.   Neurological: She is alert and oriented to person, place, and time. No cranial nerve deficit.   Mentation labile     Skin: Skin is warm and dry. She is not diaphoretic. No erythema. No pallor.   Psychiatric: She has a normal mood and affect. Her behavior is normal.   Nursing note and vitals reviewed.      Fluids    Intake/Output Summary (Last 24 hours) at 04/09/19 1713  Last data filed at 04/09/19 1500   Gross per 24 hour   Intake           554.82 ml   Output              370 ml   Net           184.82 ml       Laboratory  Recent Labs      04/07/19   0015  04/08/19   0013  04/09/19   0256   WBC  4.3*  3.3*  4.6*   RBC  4.40  4.79  4.62   HEMOGLOBIN  11.1*  12.1  11.5*   HEMATOCRIT  35.1*  38.5  37.0   MCV  79.8*  80.4*  80.1*   MCH  25.2*  25.3*  24.9*   MCHC  31.6*  31.4*  31.1*   RDW  50.3*  50.4*  50.5*   PLATELETCT  161*  126*  155*   MPV  10.5  9.9  10.5      Recent Labs      04/07/19   0015  04/08/19   0013  04/09/19   0256   SODIUM  130*  133*  132*   POTASSIUM  4.5  3.9  4.2   CHLORIDE  97  100  101   CO2  25  25  25   GLUCOSE  99  95  89   BUN  22  23*  15   CREATININE  0.72  0.80  0.63   CALCIUM  10.1  10.7*  10.5                   Imaging  CT-NEEDLE BX-MUSCLE RIGHT   Final Result   Addendum 1 of 1   Addendum:   The biopsy/drainage was done utilizing low dose optimization CT techniques    including auto modulation for  imaging and low mA CT/fluoroscopy mode    for the procedure.      Final      CT-guided aspiration of pus like material in the right gluteal muscle lesion.      CT-ABDOMEN-PELVIS WITH   Final Result      1.  Resolution of right iliacus abscess with no associated fluid adjacent to the drainage catheter      2.  Interval removal of right gluteal drainage catheter      3.  Surgical screw traversing both sacroiliac joint with associated pathologic fracture of the right ilium and right medial sacrum      4.  Hepatomegaly      CT-DRAIN-HEMATOMA - SEROMA   Final Result      1.  CT-GUIDED RETROPERITONEAL (EXTRAPERITONEAL) RIGHT PELVIC ABSCESS DRAINAGE AT THE RIGHT ILIOPSOAS. ORDERS WERE WRITTEN FOR ONCE DAILY IRRIGATION OF THE CATHETER WITH 5 ML STERILE SALINE.      2.  THE CURRENT PLAN IS TO MONITOR DRAINAGE OUTPUT AND OBTAIN A FOLLOWUP CT SCAN IN 5-7 DAYS IF CLINICALLY INDICATED.      CT-ABDOMEN-PELVIS WITH   Final Result      1.  Rim-enhancing fluid collection in the right iliopsoas muscle may have increased in size slightly from the prior exam.      2.  Pigtail catheter in the right gluteus medius muscle. No residual fluid collection is appreciated.      3.  Pathologic fracture of the right ilium. Status post right SI joint fusion.      4.  Atherosclerosis.      5.  Cholelithiasis.      CT-HEAD WITH & W/O   Final Result      1.  Interval decrease in size and level of enhancement of multiple small punctate foci in both cerebral hemispheres and in  the midbrain consistent with improving septic emboli.      2.  No hemorrhage or mass effect.      CT-DRAIN-RETROPERITONEAL   Final Result      1.  CT GUIDED CATHETER DRAINAGE OF RIGHT GLUTEAL ABSCESS.   2.  THE CURRENT PLAN IS TO OBTAIN A FOLLOW-UP CT SCAN IN 5-7 DAYS IF INDICATED. ORDERS WERE WRITTEN FOR ONCE DAILY IRRIGATION OF THE CATHETER WITH 5 ML STERILE SALINE FOR 3 DAYS.   3. THE ILIOPSOAS COLLECTION WAS NOT AMENABLE TO CATHETER DRAINAGE AS INTERVENING BOWEL AND/OR BONY STRUCTURES OBSTRUCT A PATHWAY TO THIS COLLECTION AT THIS TIME.      MR-LUMBAR SPINE-WITH & W/O   Final Result      1.  There are multifocal enhancing fluid collections in the right iliopsoas muscles and gluteus muscles adjacent to the right ilium. Right iliopsoas fluid collection measures an approximately 3.9 x 2.9 cm. The right gluteal fluid collection measures an    approximately 3.5 x 2.8 cm in size. The differential diagnosis includes postsurgical seroma and abscess.   2.  Post operative and degenerative changes as described above.      MR-PELVIS-WITH & W/O AND SEQUENCES   Final Result      1.  Surgical screw traverses both sacroiliac joint across presumed pathologic fracture of the right sacrum and the hardware obscures the adjacent tissues.      2.  No other evidence for bony metastatic disease      3.  Right gluteal musculature rim-enhancing fluid collection measuring 3.4 x 2.8 cm. This could be a postoperative seroma versus abscess      4.  osteoarthritis of both hip joint      5.  Prior hysterectomy      IR-MIDLINE CATHETER INSERTION WO GUIDANCE > AGE 3   Final Result                  Ultrasound-guided midline placement performed by qualified nursing staff    as above.          CT-HEAD WITH   Final Result      Multiple subcentimeter too numerous to count enhancing masses scattered throughout the supratentorial and infratentorial brain. These demonstrate some surrounding vasogenic edema and most likely represent multifocal metastatic  lesions. Other    considerations would include multifocal abscesses or septic emboli.      EC-CHAS W/O CONT   Final Result      EC-CHAS W/O CONT   Final Result      EC-ECHOCARDIOGRAM COMPLETE W/O CONT   Final Result      OUTSIDE IMAGES-CT CHEST/ABDOMEN/PELVIS   Final Result      OUTSIDE IMAGES-DX CHEST   Final Result      KD-IABMNHE-HMMJCCN FILM X-RAY   Final Result      OUTSIDE IMAGES-MR BRAIN   Final Result      OUTSIDE IMAGES-MR BRAIN   Final Result           Assessment/Plan  * Lesion of brain   Assessment & Plan    Metastatic cancer vs infectious etiology  D/w Dr Black for questionable brain mets, recommending IR Bx of psoas muscle   IR Bx of psoas muscle ordered - was abscess  Empiric treatment of infection  TTE and CHAS negative for vegetations.  D/w Dr Nguyen - lesions likely metastatic based on his interpretation of MRI/CT - too small to biopsy, recommends continuing antibiotics and re-imaging in 2 weeks to see if there has been any change.  CT brain to be repeated - lesions shrinking and decreased in overall number - c/w septic emboli       Encephalopathy   Assessment & Plan    Likely toxic metabolic from infection  Waxes and wanes       Mass of iliopsoas muscle group   Assessment & Plan    Return of fluid collection, repeat drainage in IR with cultures, drain placed 3/29 - 10ml purulent material drained and sent for culture - no growth a/o 3/30  Was on linezolid and cefepime and now on vanco/merrem - end date to be 6 weeks of treatment. (4/24/19)  ID consulting   Repeat CT abdomen shows resolution of fluid collection and ÁNGEL output dwindling - will have RN remove ÁNGEL drain         Normocytic anemia   Assessment & Plan    stable     Hyponatremia- (present on admission)   Assessment & Plan    Mildly low - doubt contribution to mentation         RLS (restless legs syndrome)- (present on admission)   Assessment & Plan    Pramipexole       Diabetes mellitus type 2 in obese (HCC)- (present on admission)   Assessment  & Plan    Well controlled   Short acting insulin as needed   Accu-Checks, hypoglycemia protocol          History of breast cancer- (present on admission)   Assessment & Plan    infection to brain  No known active breast cancer.       COPD (chronic obstructive pulmonary disease) (HCC)- (present on admission)   Assessment & Plan    Oxygen as needed, Respiratory protocol, Bronchodilators, Incentive spirometry      Urinary tract infection   Assessment & Plan    Culture remain no growth.       Fungal infection of the groin   Assessment & Plan    on nystatin powder 3/24     History of deep vein thrombosis- (present on admission)   Assessment & Plan    History of deep vein thrombosis   Was on Rivaroxaban as outpatient.     Held for Bx Mar 11  Restart AC after completing abx treatment         Essential hypertension- (present on admission)   Assessment & Plan    Restarted on losartan and furosemide with hold parameters.     GERD (gastroesophageal reflux disease)- (present on admission)   Assessment & Plan    Omeprazole     Chronic pain- (present on admission)   Assessment & Plan     Multifactorial  Likely secondary to abscess, fracture  Pain control            VTE prophylaxis: scds

## 2019-04-11 LAB
ANION GAP SERPL CALC-SCNC: 6 MMOL/L (ref 0–11.9)
ANISOCYTOSIS BLD QL SMEAR: ABNORMAL
BASOPHILS # BLD AUTO: 0.9 % (ref 0–1.8)
BASOPHILS # BLD: 0.04 K/UL (ref 0–0.12)
BUN SERPL-MCNC: 20 MG/DL (ref 8–22)
CALCIUM SERPL-MCNC: 10.4 MG/DL (ref 8.5–10.5)
CHLORIDE SERPL-SCNC: 101 MMOL/L (ref 96–112)
CO2 SERPL-SCNC: 25 MMOL/L (ref 20–33)
CREAT SERPL-MCNC: 0.64 MG/DL (ref 0.5–1.4)
EOSINOPHIL # BLD AUTO: 0.23 K/UL (ref 0–0.51)
EOSINOPHIL NFR BLD: 5.4 % (ref 0–6.9)
ERYTHROCYTE [DISTWIDTH] IN BLOOD BY AUTOMATED COUNT: 50.5 FL (ref 35.9–50)
GLUCOSE SERPL-MCNC: 93 MG/DL (ref 65–99)
HCT VFR BLD AUTO: 39.2 % (ref 37–47)
HGB BLD-MCNC: 12.4 G/DL (ref 12–16)
LYMPHOCYTES # BLD AUTO: 0.45 K/UL (ref 1–4.8)
LYMPHOCYTES NFR BLD: 10.8 % (ref 22–41)
MANUAL DIFF BLD: NORMAL
MCH RBC QN AUTO: 25.2 PG (ref 27–33)
MCHC RBC AUTO-ENTMCNC: 31.6 G/DL (ref 33.6–35)
MCV RBC AUTO: 79.7 FL (ref 81.4–97.8)
MONOCYTES # BLD AUTO: 0.49 K/UL (ref 0–0.85)
MONOCYTES NFR BLD AUTO: 11.7 % (ref 0–13.4)
MORPHOLOGY BLD-IMP: NORMAL
NEUTROPHILS # BLD AUTO: 2.99 K/UL (ref 2–7.15)
NEUTROPHILS NFR BLD: 71.2 % (ref 44–72)
NRBC # BLD AUTO: 0 K/UL
NRBC BLD-RTO: 0 /100 WBC
PLATELET # BLD AUTO: 108 K/UL (ref 164–446)
PLATELET BLD QL SMEAR: NORMAL
PMV BLD AUTO: 10.4 FL (ref 9–12.9)
POIKILOCYTOSIS BLD QL SMEAR: NORMAL
POTASSIUM SERPL-SCNC: 4.6 MMOL/L (ref 3.6–5.5)
RBC # BLD AUTO: 4.92 M/UL (ref 4.2–5.4)
RBC BLD AUTO: PRESENT
SODIUM SERPL-SCNC: 132 MMOL/L (ref 135–145)
VANCOMYCIN TROUGH SERPL-MCNC: 17.2 UG/ML (ref 10–20)
WBC # BLD AUTO: 4.2 K/UL (ref 4.8–10.8)

## 2019-04-11 PROCEDURE — 700102 HCHG RX REV CODE 250 W/ 637 OVERRIDE(OP): Performed by: INTERNAL MEDICINE

## 2019-04-11 PROCEDURE — A9270 NON-COVERED ITEM OR SERVICE: HCPCS | Performed by: HOSPITALIST

## 2019-04-11 PROCEDURE — 85007 BL SMEAR W/DIFF WBC COUNT: CPT

## 2019-04-11 PROCEDURE — 700105 HCHG RX REV CODE 258: Performed by: INTERNAL MEDICINE

## 2019-04-11 PROCEDURE — 36415 COLL VENOUS BLD VENIPUNCTURE: CPT

## 2019-04-11 PROCEDURE — 85027 COMPLETE CBC AUTOMATED: CPT

## 2019-04-11 PROCEDURE — 99232 SBSQ HOSP IP/OBS MODERATE 35: CPT | Performed by: INTERNAL MEDICINE

## 2019-04-11 PROCEDURE — 700111 HCHG RX REV CODE 636 W/ 250 OVERRIDE (IP): Performed by: INTERNAL MEDICINE

## 2019-04-11 PROCEDURE — 700102 HCHG RX REV CODE 250 W/ 637 OVERRIDE(OP): Performed by: HOSPITALIST

## 2019-04-11 PROCEDURE — 99233 SBSQ HOSP IP/OBS HIGH 50: CPT | Performed by: INTERNAL MEDICINE

## 2019-04-11 PROCEDURE — 80202 ASSAY OF VANCOMYCIN: CPT

## 2019-04-11 PROCEDURE — 770004 HCHG ROOM/CARE - ONCOLOGY PRIVATE *

## 2019-04-11 PROCEDURE — 80048 BASIC METABOLIC PNL TOTAL CA: CPT

## 2019-04-11 PROCEDURE — 700111 HCHG RX REV CODE 636 W/ 250 OVERRIDE (IP): Performed by: HOSPITALIST

## 2019-04-11 PROCEDURE — A9270 NON-COVERED ITEM OR SERVICE: HCPCS | Performed by: INTERNAL MEDICINE

## 2019-04-11 RX ADMIN — NYSTATIN: 100000 POWDER TOPICAL at 18:15

## 2019-04-11 RX ADMIN — LOSARTAN POTASSIUM 50 MG: 50 TABLET ORAL at 07:17

## 2019-04-11 RX ADMIN — FUROSEMIDE 20 MG: 20 TABLET ORAL at 07:17

## 2019-04-11 RX ADMIN — OMEPRAZOLE 20 MG: 20 CAPSULE, DELAYED RELEASE ORAL at 07:17

## 2019-04-11 RX ADMIN — MEROPENEM 2 G: 1 INJECTION, POWDER, FOR SOLUTION INTRAVENOUS at 08:30

## 2019-04-11 RX ADMIN — NYSTATIN: 100000 POWDER TOPICAL at 07:19

## 2019-04-11 RX ADMIN — POTASSIUM CHLORIDE 40 MEQ: 1500 TABLET, EXTENDED RELEASE ORAL at 18:14

## 2019-04-11 RX ADMIN — QUETIAPINE FUMARATE 25 MG: 25 TABLET ORAL at 18:14

## 2019-04-11 RX ADMIN — MORPHINE SULFATE 2 MG: 4 INJECTION INTRAVENOUS at 14:03

## 2019-04-11 RX ADMIN — VANCOMYCIN HYDROCHLORIDE 600 MG: 100 INJECTION, POWDER, LYOPHILIZED, FOR SOLUTION INTRAVENOUS at 21:05

## 2019-04-11 RX ADMIN — MEROPENEM 2 G: 1 INJECTION, POWDER, FOR SOLUTION INTRAVENOUS at 13:42

## 2019-04-11 RX ADMIN — POTASSIUM CHLORIDE 40 MEQ: 1500 TABLET, EXTENDED RELEASE ORAL at 07:17

## 2019-04-11 RX ADMIN — QUETIAPINE FUMARATE 25 MG: 25 TABLET ORAL at 11:54

## 2019-04-11 RX ADMIN — MEROPENEM 2 G: 1 INJECTION, POWDER, FOR SOLUTION INTRAVENOUS at 23:26

## 2019-04-11 RX ADMIN — GABAPENTIN 300 MG: 300 CAPSULE ORAL at 18:14

## 2019-04-11 RX ADMIN — OXYCODONE HYDROCHLORIDE 2.5 MG: 5 TABLET ORAL at 11:54

## 2019-04-11 RX ADMIN — GABAPENTIN 300 MG: 300 CAPSULE ORAL at 07:16

## 2019-04-11 ASSESSMENT — ENCOUNTER SYMPTOMS
MYALGIAS: 1
BLURRED VISION: 0
INSOMNIA: 0
DEPRESSION: 0
SHORTNESS OF BREATH: 0
SORE THROAT: 0
NAUSEA: 0
VOMITING: 0
CHILLS: 0
PHOTOPHOBIA: 0
HEADACHES: 0
HEARTBURN: 0
BACK PAIN: 1
ABDOMINAL PAIN: 0
DIARRHEA: 0
SENSORY CHANGE: 0
FEVER: 0
COUGH: 0
WEAKNESS: 0
CONSTIPATION: 0
NERVOUS/ANXIOUS: 0
DIZZINESS: 0
SPEECH CHANGE: 0
CLAUDICATION: 0

## 2019-04-11 ASSESSMENT — PATIENT HEALTH QUESTIONNAIRE - PHQ9
2. FEELING DOWN, DEPRESSED, IRRITABLE, OR HOPELESS: NEARLY EVERY DAY
1. LITTLE INTEREST OR PLEASURE IN DOING THINGS: SEVERAL DAYS
SUM OF ALL RESPONSES TO PHQ9 QUESTIONS 1 AND 2: 4

## 2019-04-11 NOTE — DISCHARGE PLANNING
Received Choice form at [phone] from Rufina CARLOS  Agency/Facility Name: [facility]   Referral sent per Choice form @ [phone] via manual fax.

## 2019-04-11 NOTE — DIETARY
Brief Nutrition Update:  Patient eating 25-50% of meals, but % of Boost supplements which she is receiving 4 times per day.  If she drinks 4 Boost plus per day it provides 1440 kcal/d or 23 kcal/kg in addition to calorie provision from meals.  Of note, no new weight noted since 4/1.  Need current weight.  RD following.

## 2019-04-11 NOTE — DISCHARGE PLANNING
Agency/Facility Name: [facility]  Outcome: no aswer. Left a message for Poonam (admissions) to call [facility] back.     Rufina CARLOS notified.

## 2019-04-11 NOTE — PROGRESS NOTES
Received report from PATRICK Sanchez. Assumed care of pt [id number]. Communication board updated, bed alarm in place. All needs met at this time per pt.

## 2019-04-11 NOTE — CARE PLAN
Problem: Nutritional:  Goal: Achieve adequate nutritional intake  Patient will consume >50% of meals   Outcome: PROGRESSING SLOWER THAN EXPECTED  See RD progress note

## 2019-04-11 NOTE — CARE PLAN
Problem: Safety  Goal: Will remain free from injury  Outcome: PROGRESSING AS EXPECTED  All fall precautions in place, bed alarm in place, hourly rounding complete, call light and personal belongings kept within reach at all times. Education done with pt on importance of calling before getting OOB, pt verbalizes an understanding. Will continue to monitor.     Problem: Pain Management  Goal: Pain level will decrease to patient's comfort goal  Outcome: PROGRESSING AS EXPECTED  Pain controlled at this time with PRN pain medication per pt

## 2019-04-11 NOTE — PROGRESS NOTES
"[facility] Medicine Daily Progress Note    Date of Service  4/11/2019    Chief Complaint  68 y.o. female admitted 3/8/2019 with right hip pain, fever and abnormal brain mri.    Hospital Course    Patient started on empiric antibiotics given fever.  She had abnormal findings on MRI brain and CT showed \"lesion\" on right iliopsoas.  This lesion was biopsied and turned out to be abscess.  She has history of breast cancer and there is also concern of brain lesions being metastatic though their appearance is not typical of this.      Interval Problem Update  3/12 Discussed with Dr Turner for second opinion of brain lesions and based on appearance not able to r/o or r/i any diagnosis.  Given patient's presentation of confusion, hip pain and fever  - this is more supportive of infectious etiology rather than malignant.  Fluid from right gluteal area sent to micro and as of yet - no growth.  Continue zosyn for now.  3/13 Patient with slight improvement in mentation.  Blood cultures and abscess cultures are showing no growth at this time.  TTE being done while I examined patient - no evidence of vegetations on this test.  ID consultation pending - d/w Dr Garcia.  3/14 Patient feeling well today, her pain is present but not as bad as prior to admission.  She was able to follow the conversation today and is agreeable to move forward with the CHAS tomorrow.  I spoke with her brother, René, and updated him on condition yesterday afternoon also.  Will also have PICC placed in next few days as long as blood cultures remain negative as I expect a prolonged antibiotic course of treatment.  3/15 Patient without new complaints, states she needs help to move in bed.  CHAS showed no evidence of vegetations and regular diet resumed.  Culture remains negative and biopsy of brain lesion may prove needed - will watch through the weekend and if mentation does not continue to improve, will discuss with neurosurgery on Monday.  3/16 Patient states " she is okay today, mentation has waxed and waned without significant improvement.  She was febrile earlier today.  No other acute events.  3/17 Patient with significant improvement in mentation today, she is aware of her hospitalization, not falling asleep mid sentence.  Her pain mediations were held most of yesterday and likely far too strong for patient and were affecting her awareness.  Oxycodone 5 discontinued and will use tramadol instead unless pain not well controlled.  CT brain with contrast ordered as a quick scan for comparison to MRI.    3/18 Patient feeling same today, discussed need to discuss with neurosurgery for possible biopsy.  Discussed with Dr Nguyen, he reviewed MRI and CT brain and he feels they are likely metastatic lesions but are far too small to biopsy.  His recommendation is to continue with antibiotics and re-image in 2 weeks to see if any change that would be amenable to biopsy or that would further declare infection.  3/26 Patient mental status continues to wax and wane.  ID ordered MRI lumbar, pelvis and sacrum for evaluation for source of infection, patient with continued low back pain and no real improvement since I saw her 7 days prior with continued antibiotics during this time.  Cultures have not growth pathogen, repeat MRI 3/28.   3/27 Patient somnolent most of the day today, MRI's completed and showing 2 areas of fluid collections, given her history are likely abscesses and will require drainage, CT guided drainage requested and patient NPO at [facility] for this.  D/w ID - cultures will be done on fluid collection.  Repeat CT brain ordered for tomorrow.  3/28 Patient up to chair, diet resumed as lovenox given this am and drainage of abscesses deferred until tomorrow.  Vanco/merrem to continue and cultures will be collected from abscesses.  3/29 Patient went to IR today, had drain placed in the right buttock into abscess cavity and fluid sent for culture.  Potassium is slightly low,  continue with PO replacement.  HR has improved from yesterday but patient PO intake still not at normal level, increase IVF rate to 100cc/hour.  3/30 Patient without fever since drain placed right buttock.  Purulent material in ÁNGEL bulb. Cultures thus far are still showing no growth.  Antibiotics to continue.  CT head ordered to evaluate change in past 2 weeks as recommended by neurosurgery.  3/31 Patient with fevers overnight - was altered during this time but has recovered.  She denies pain when examined by me.  She still has purulent material in the drainage tubing.  Will continue with current IV antibiotics - 6 weeks total antibiotics suspected - end date would be 4/24/19.  D/w ID.  4/1 Patient remains intermittently somnolent.  She wakes easily but falls back to sleep quickly.  She remained afebrile overnight.  ÁNGEL drain continues to drain purulent material.  ABX through 4/24 - once accepting facility found, patient okay to transfer with midline intact for completion of antibiotics.    4/9 Patient somnolent when examined.  CT showed resolution of fluid collection where ÁNGEL in place.  RN to remove ÁNGEL today.  All cultures still remain without growth.  4/10 Patient seen when working with OT, she is awake but gets off task quickly and is easily fatigued.  ÁNGEL drain removed yesterday.  Patient states she has pain in the lumbar spine when she coughs - just above her surgical area.  She has been in bed for nearly 1 month and this is likely not helping her back pain - educated need for OOB as often as tolerated.  4/11 Patient had bowel incontinence prior to my entering room, states she thinks she needs a bed pan.  CNA notified.  She continues to have low back pain in the area of her surgery and given her bed-bound status during her infection and intermittent confusion, this is not unexpected.  Continuing with therapy recommended and patient is 1:1 feeding given her weakness to feed herself.    Consultants/Specialty  ID -  Sarah/Ashish    Code Status  full    Disposition  SNF/LTAC in [place]    Review of Systems  Review of Systems   Constitutional: Negative for chills and fever.   HENT: Negative for congestion and sore throat.    Eyes: Negative for blurred vision and photophobia.   Respiratory: Negative for cough and shortness of breath.    Cardiovascular: Negative for chest pain, claudication and leg swelling.   Gastrointestinal: Negative for abdominal pain, constipation, diarrhea, heartburn, nausea and vomiting.   Genitourinary: Negative for dysuria and hematuria.   Musculoskeletal: Positive for back pain (lumbar/sacral area) and myalgias. Negative for joint pain.   Skin: Negative for itching and rash.   Neurological: Negative for dizziness, sensory change, speech change, weakness and headaches.   Psychiatric/Behavioral: Negative for depression. The patient is not nervous/anxious and does not have insomnia.         Physical Exam  Temp:  [36.2 °C (97.2 °F)-37.2 °C (98.9 °F)] 36.2 °C (97.2 °F)  Pulse:  [102-119] 107  Resp:  [18-20] 18  BP: (113-157)/(69-83) 113/69  SpO2:  [94 %-96 %] 94 %    Physical Exam   Constitutional: She is oriented to person, place, and time. She appears well-developed and well-nourished. No distress.   HENT:   Head: Normocephalic and atraumatic.   Eyes: Conjunctivae are normal. No scleral icterus.   Neck: Neck supple. No JVD present.   Cardiovascular: Normal rate, regular rhythm and normal heart sounds.  Exam reveals no gallop and no friction rub.    No murmur heard.  Pulmonary/Chest: Effort normal and breath sounds normal. No respiratory distress. She exhibits no tenderness.   Abdominal: Soft. Bowel sounds are normal. She exhibits no distension. There is no guarding.   Musculoskeletal: She exhibits no edema or tenderness.   ÁNGEL drain removed       Lymphadenopathy:     She has no cervical adenopathy.   Neurological: She is alert and oriented to person, place, and time. No cranial nerve deficit.    Mentation labile     Skin: Skin is warm and dry. She is not diaphoretic. No erythema. No pallor.   Psychiatric: She has a normal mood and affect. Her behavior is normal.   Nursing note and vitals reviewed.      Fluids    Intake/Output Summary (Last 24 hours) at 04/11/19 1453  Last data filed at 04/11/19 1200   Gross per 24 hour   Intake             1210 ml   Output                0 ml   Net             1210 ml       Laboratory  Recent Labs      04/09/19   0256  04/10/19   0006  04/11/19   0025   WBC  4.6*  4.6*  4.2*   RBC  4.62  5.03  4.92   HEMOGLOBIN  11.5*  12.3  12.4   HEMATOCRIT  37.0  40.7  39.2   MCV  80.1*  80.9*  79.7*   MCH  24.9*  24.5*  25.2*   MCHC  31.1*  30.2*  31.6*   RDW  50.5*  51.6*  50.5*   PLATELETCT  155*  133*  108*   MPV  10.5  10.2  10.4     Recent Labs      04/09/19   0256  04/10/19   0006  04/11/19   0025   SODIUM  132*  133*  132*   POTASSIUM  4.2  4.5  4.6   CHLORIDE  101  101  101   CO2  25  24  25   GLUCOSE  89  85  93   BUN  15  19  20   CREATININE  0.63  0.65  0.64   CALCIUM  10.5  10.3  10.4                   Imaging  CT-NEEDLE BX-MUSCLE RIGHT   Final Result   Addendum 1 of 1   Addendum:   The biopsy/drainage was done utilizing low dose optimization CT techniques    including auto modulation for  imaging and low mA CT/fluoroscopy mode    for the procedure.      Final      CT-guided aspiration of pus like material in the right gluteal muscle lesion.      CT-ABDOMEN-PELVIS WITH   Final Result      1.  Resolution of right iliacus abscess with no associated fluid adjacent to the drainage catheter      2.  Interval removal of right gluteal drainage catheter      3.  Surgical screw traversing both sacroiliac joint with associated pathologic fracture of the right ilium and right medial sacrum      4.  Hepatomegaly      CT-DRAIN-HEMATOMA - SEROMA   Final Result      1.  CT-GUIDED RETROPERITONEAL (EXTRAPERITONEAL) RIGHT PELVIC ABSCESS DRAINAGE AT THE RIGHT ILIOPSOAS. ORDERS WERE  WRITTEN FOR ONCE DAILY IRRIGATION OF THE CATHETER WITH 5 ML STERILE SALINE.      2.  THE CURRENT PLAN IS TO MONITOR DRAINAGE OUTPUT AND OBTAIN A FOLLOWUP CT SCAN IN 5-7 DAYS IF CLINICALLY INDICATED.      CT-ABDOMEN-PELVIS WITH   Final Result      1.  Rim-enhancing fluid collection in the right iliopsoas muscle may have increased in size slightly from the prior exam.      2.  Pigtail catheter in the right gluteus medius muscle. No residual fluid collection is appreciated.      3.  Pathologic fracture of the right ilium. Status post right SI joint fusion.      4.  Atherosclerosis.      5.  Cholelithiasis.      CT-HEAD WITH & W/O   Final Result      1.  Interval decrease in size and level of enhancement of multiple small punctate foci in both cerebral hemispheres and in the midbrain consistent with improving septic emboli.      2.  No hemorrhage or mass effect.      CT-DRAIN-RETROPERITONEAL   Final Result      1.  CT GUIDED CATHETER DRAINAGE OF RIGHT GLUTEAL ABSCESS.   2.  THE CURRENT PLAN IS TO OBTAIN A FOLLOW-UP CT SCAN IN 5-7 DAYS IF INDICATED. ORDERS WERE WRITTEN FOR ONCE DAILY IRRIGATION OF THE CATHETER WITH 5 ML STERILE SALINE FOR 3 DAYS.   3. THE ILIOPSOAS COLLECTION WAS NOT AMENABLE TO CATHETER DRAINAGE AS INTERVENING BOWEL AND/OR BONY STRUCTURES OBSTRUCT A PATHWAY TO THIS COLLECTION AT THIS TIME.      MR-LUMBAR SPINE-WITH & W/O   Final Result      1.  There are multifocal enhancing fluid collections in the right iliopsoas muscles and gluteus muscles adjacent to the right ilium. Right iliopsoas fluid collection measures an approximately 3.9 x 2.9 cm. The right gluteal fluid collection measures an    approximately 3.5 x 2.8 cm in size. The differential diagnosis includes postsurgical seroma and abscess.   2.  Post operative and degenerative changes as described above.      MR-PELVIS-WITH & W/O AND SEQUENCES   Final Result      1.  Surgical screw traverses both sacroiliac joint across presumed pathologic fracture  of the right sacrum and the hardware obscures the adjacent tissues.      2.  No other evidence for bony metastatic disease      3.  Right gluteal musculature rim-enhancing fluid collection measuring 3.4 x 2.8 cm. This could be a postoperative seroma versus abscess      4.  osteoarthritis of both hip joint      5.  Prior hysterectomy      IR-MIDLINE CATHETER INSERTION WO GUIDANCE > AGE 3   Final Result                  Ultrasound-guided midline placement performed by qualified nursing staff    as above.          CT-HEAD WITH   Final Result      Multiple subcentimeter too numerous to count enhancing masses scattered throughout the supratentorial and infratentorial brain. These demonstrate some surrounding vasogenic edema and most likely represent multifocal metastatic lesions. Other    considerations would include multifocal abscesses or septic emboli.      EC-CHAS W/O CONT   Final Result      EC-CHAS W/O CONT   Final Result      EC-ECHOCARDIOGRAM COMPLETE W/O CONT   Final Result      OUTSIDE IMAGES-CT CHEST/ABDOMEN/PELVIS   Final Result      OUTSIDE IMAGES-DX CHEST   Final Result      DL-DOVHYLK-OXYNCKK FILM X-RAY   Final Result      OUTSIDE IMAGES-MR BRAIN   Final Result      OUTSIDE IMAGES-MR BRAIN   Final Result           Assessment/Plan  * Lesion of brain   Assessment & Plan    Metastatic cancer vs infectious etiology  D/w Dr Black for questionable brain mets, recommending IR Bx of psoas muscle   IR Bx of psoas muscle ordered - was abscess  Empiric treatment of infection  TTE and CHAS negative for vegetations.  D/w Dr Nguyen - lesions likely metastatic based on his interpretation of MRI/CT - too small to biopsy, recommends continuing antibiotics and re-imaging in 2 weeks to see if there has been any change.  CT brain to be repeated - lesions shrinking and decreased in overall number - c/w septic emboli       Encephalopathy   Assessment & Plan    Likely toxic metabolic from infection  Waxes and wanes       Mass of  iliopsoas muscle group   Assessment & Plan    Return of fluid collection, repeat drainage in IR with cultures, drain placed 3/29 - 10ml purulent material drained and sent for culture - no growth a/o 3/30  Was on linezolid and cefepime and now on vanco/merrem - end date to be 6 weeks of treatment. (4/24/19)  ID consulting   Repeat CT abdomen shows resolution of fluid collection and ÁNGEL output dwindling - will have RN remove ÁNGEL drain         Normocytic anemia   Assessment & Plan    stable     Hyponatremia- (present on admission)   Assessment & Plan    Mildly low - doubt contribution to mentation         RLS (restless legs syndrome)- (present on admission)   Assessment & Plan    Pramipexole       Diabetes mellitus type 2 in obese (HCC)- (present on admission)   Assessment & Plan    Well controlled   Short acting insulin as needed   Accu-Checks, hypoglycemia protocol          History of breast cancer- (present on admission)   Assessment & Plan    infection to brain  No known active breast cancer.       COPD (chronic obstructive pulmonary disease) (HCC)- (present on admission)   Assessment & Plan    Oxygen as needed, Respiratory protocol, Bronchodilators, Incentive spirometry      Urinary tract infection   Assessment & Plan    Culture remain no growth.       Fungal infection of the groin   Assessment & Plan    on nystatin powder 3/24     History of deep vein thrombosis- (present on admission)   Assessment & Plan    History of deep vein thrombosis   Was on Rivaroxaban as outpatient.     Held for Bx Mar 11  Restart AC after completing abx treatment         Essential hypertension- (present on admission)   Assessment & Plan    Restarted on losartan and furosemide with hold parameters.     GERD (gastroesophageal reflux disease)- (present on admission)   Assessment & Plan    Omeprazole     Chronic pain- (present on admission)   Assessment & Plan     Multifactorial  Likely secondary to abscess, fracture  Pain control             VTE prophylaxis: scds

## 2019-04-11 NOTE — PROGRESS NOTES
REPORT ACCEPTED FROM [facility]:CARE ASSUMED     PLAN: CONT. POX WITH O2, FALLS PRECAUTIONS, ASSIST WITH MEALS, PAIN MANAGEMENT, IV ANTIBIOTICS, D/C PLANNING ONGOING( SNF IN [place])

## 2019-04-11 NOTE — PROGRESS NOTES
Infectious Disease Progress Note    Author: Ashely Hinojosa M.D. Date & Time of service: 2019  12:54 PM    Chief Complaint:  Multiple brain lesions  Iliopsoas lesion    Interval History:  68-year-old female with history of diabetes and breast cancer, admitted 3/8/2019 with abdominal pain, iliopsoas lesion, and an abnormal MRI with multiple brain lesions.  2019 no fevers.  Easily arousable.  ÁNGEL continues to drain.  2019-no fevers.  Awake and responsive but feels weak.  There is minimal drainage from the ÁNGEL.  As per the nursing the patient is incontinent of stool.  But when you talk to her she states she is just is not able to get up on time.  4/3/2019-complains of some weakness in the hand. No fevers. The drain is not draining much  - no fevers. She is anxious since she has been NPO. Awaiting IR drain   no fevers.  Got her drain.  There was purulent fluid.  WBCs 4.6.   afebrile, white count 3.3.  IR drain remains in place.  Continues to have significant word finding difficulty.   per RN, patient more awake and interactive but remains confused with word finding difficulty.  Resting comfortably this morning  4/10 afebrile, white count 4.6.  Patient much more awake and interactive today.  Reports no new issues, reports no pain anywhere.  IR drain has been removed.   afebrile WBC 4.2 patient complains of pain in her legs and feet.  She is being assisted with feeding.  Denies any headaches or neck pain    Labs Reviewed    Review of Systems:  Review of Systems   Unable to perform ROS: Other   Musculoskeletal: Positive for myalgias.   Neurological: Negative for dizziness and headaches.   Limited review of systems secondary to intermittent confusion and word finding difficulty    Hemodynamics:  Temp (24hrs), Av.7 °C (98 °F), Min:36.2 °C (97.2 °F), Max:37.2 °C (98.9 °F)  Temperature: 36.2 °C (97.2 °F)  Pulse  Av.4  Min: 70  Max: 153  Blood Pressure : 113/69       Physical  Exam:  Physical Exam   Constitutional: She appears well-developed and well-nourished. No distress.   HENT:   Head: Normocephalic and atraumatic.   Eyes: Pupils are equal, round, and reactive to light. EOM are normal. Right eye exhibits no discharge. Left eye exhibits no discharge.   Neck: Neck supple.   Cardiovascular: Normal rate and intact distal pulses.    Murmur heard.  Pulmonary/Chest: Effort normal and breath sounds normal. No respiratory distress. She has no wheezes.   Abdominal: Soft. Bowel sounds are normal. She exhibits no distension. There is no tenderness.   Musculoskeletal: She exhibits no edema, tenderness or deformity.   Right upper extremity PICC line   Lymphadenopathy:     She has no cervical adenopathy.   Neurological: She is alert.   Word finding difficulty -much improved    Generalized weakness   Skin: Skin is warm. No rash noted. She is not diaphoretic.   Nursing note and vitals reviewed.    Meds:    Current Facility-Administered Medications:   •  meropenem  •  QUEtiapine  •  furosemide  •  vancomycin  •  potassium chloride SA  •  loperamide  •  MD Alert...Vancomycin per Pharmacy  •  nystatin  •  labetalol  •  ibuprofen  •  haloperidol lactate  •  pramipexole  •  tramadol  •  losartan  •  gabapentin  •  omeprazole  •  Respiratory Care per Protocol  •  acetaminophen  •  Notify provider if pain remains uncontrolled **AND** Use the numeric rating scale (NRS-11) on regular floors and Critical-Care Pain Observation Tool (CPOT) on ICUs/Trauma to assess pain **AND** Pulse Ox (Oximetry) **AND** Pharmacy Consult Request **AND** If patient difficult to arouse and/or has respiratory depression, stop any opiates that are currently infusing and call a Rapid Response. **AND** oxyCODONE immediate-release **AND** [DISCONTINUED] oxyCODONE immediate-release **AND** morphine injection  •  ondansetron  •  ondansetron    Labs:  Recent Labs      04/09/19   0256  04/10/19   0006  04/11/19   0025   WBC  4.6*  4.6*   4.2*   RBC  4.62  5.03  4.92   HEMOGLOBIN  11.5*  12.3  12.4   HEMATOCRIT  37.0  40.7  39.2   MCV  80.1*  80.9*  79.7*   MCH  24.9*  24.5*  25.2*   RDW  50.5*  51.6*  50.5*   PLATELETCT  155*  133*  108*   MPV  10.5  10.2  10.4   NEUTSPOLYS  63.50  55.60  71.20   LYMPHOCYTES  15.10*  18.70*  10.80*   MONOCYTES  15.60*  19.60*  11.70   EOSINOPHILS  4.80  5.00  5.40   BASOPHILS  0.60  0.70  0.90   RBCMORPHOLO   --    --   Present     Recent Labs      04/09/19   0256  04/10/19   0006  04/11/19   0025   SODIUM  132*  133*  132*   POTASSIUM  4.2  4.5  4.6   CHLORIDE  101  101  101   CO2  25  24  25   GLUCOSE  89  85  93   BUN  15  19  20     Recent Labs      04/09/19   0256  04/10/19   0006  04/11/19   0025   CREATININE  0.63  0.65  0.64       Imaging:  Ct-needle Bx-muscle Right    Result Date: 3/13/2019  3/11/2019 4:44 PM HISTORY/REASON FOR EXAM:  Right gluteal hypodense lesion. Moderate sedation was administered with continuous monitoring of the patient under the direct supervision of  Medications: 2 mg of Versed and 200 mcg of fentanyl Total sedation time 60 minutes Procedure: After the informed consent the patient was placed on the CT table on prone position. Localization CT scan was obtained. It demonstrates hypodense area in the right gluteus muscle. Subsequently a 17-gauge needle was advanced into the lesion. 20  mL of pus was aspirated. The materials were sent to the pathology. The patient tolerated the procedure without any immediate competition.     CT-guided aspiration of pus like material in the right gluteal muscle lesion.    Ec-echocardiogram Complete W/o Cont    Result Date: 3/13/2019  Transthoracic Echo Report Echocardiography Laboratory CONCLUSIONS No prior study is available for comparison. Normal left ventricular systolic function. Left ventricular ejection fraction is visually estimated to be 65%. Normal diastolic function. Normal inferior vena cava size and inspiratory collapse. Aortic  sclerosis without stenosis. Estimated right ventricular systolic pressure  is 33 mmHg. No obvious valvular vegetations are noted on a decent quality study.  If further valvular assessment is clinically indicated, consider transesophageal echocardiogram. SAM,  LV EF:  65    % FINDINGS Left Ventricle Normal left ventricular size and systolic function. Normal left ventricular wall thickness. Left ventricular ejection fraction is visually estimated to be 65%. Normal diastolic function. Right Ventricle Normal right ventricular size and systolic function. Right Atrium Normal right atrial size. Normal inferior vena cava size and inspiratory collapse. Left Atrium Normal left atrial size. Mitral Valve Structurally normal mitral valve without significant stenosis or regurgitation. Aortic Valve Aortic sclerosis without stenosis. No aortic insufficiency. Tricuspid Valve Structurally normal tricuspid valve. Mild tricuspid regurgitation. Right atrial pressure is estimated to be 3 mmHg. Estimated right ventricular systolic pressure  is 33 mmHg. Pulmonic Valve The pulmonic valve is not well visualized. No pulmonic insufficiency. Pericardium Normal pericardium without effusion. Aorta The aortic root is normal.  Ascending aorta diameter is 3.0 cm. Claire Tripathi MD (Electronically Signed) Final Date:     13 March 2019                 14:38      Micro:  Results     Procedure Component Value Units Date/Time    CULTURE WOUND W/ GRAM STAIN [[id number]] Collected:  04/04/19 1931    Order Status:  Completed Specimen:  Wound Updated:  04/07/19 0731     Significant Indicator NEG     Source WND     Site Retroperitoneal Drain Aspirate     Culture Result Wound No growth at 72 hours     Gram Stain Result Many WBCs.  No organisms seen.      BLOOD CULTURE [[id number]] Collected:  03/31/19 1504    Order Status:  Completed Specimen:  Blood from Peripheral Updated:  04/05/19 1900     Significant Indicator NEG     Source BLD     Site PERIPHERAL      "Blood Culture No growth after 5 days of incubation.    Narrative:       Per Hospital Policy: Only change Specimen Src: to \"Line\" if  specified by physician order.    BLOOD CULTURE [[id number]] Collected:  03/31/19 1504    Order Status:  Completed Specimen:  Blood from Peripheral Updated:  04/05/19 1900     Significant Indicator NEG     Source BLD     Site PERIPHERAL     Blood Culture No growth after 5 days of incubation.    Narrative:       Per Hospital Policy: Only change Specimen Src: to \"Line\" if  specified by physician order.    GRAM STAIN [[id number]] Collected:  04/04/19 1931    Order Status:  Completed Specimen:  Wound Updated:  04/05/19 0832     Significant Indicator .     Source WND     Site Retroperitoneal Drain Aspirate     Gram Stain Result Many WBCs.  No organisms seen.      FLUID CULTURE W/GRAM STAIN [[id number]]     Order Status:  No result Specimen:  Body Fluid from Peritoneal Fluid           Assessment:  Active Hospital Problems    Diagnosis   • *Lesion of brain [G93.9]   • Mass of iliopsoas muscle group [M62.89]   • Encephalopathy [G93.40]   •    • History of breast cancer [Z85.3]   • Diabetes mellitus type 2 in obese (HCC) [E11.69, E66.9]       Plan:  Multiple brain lesions-likely abscesses versus metastatic lesions  Leukopenia  Confusion improving  MRI with scattered small lesions incl aaron, too small to biopsy per neurosurgery  TTE neg; CHAS neg  Bcxs neg to date  Repeat CT scan on 3/30/2019 shows decrease in the size of the brain lesions, supporting that these are abscesses  Repeat MRI brain prior to completion of abx    Iliopsoas abscess, on treatment  CT + 2.6 cm lesion in the right iliopsoas region  S/p aspiration +WBCs-cultures negative to date  Bcxs remain neg  Last vanc trough of 20.4 and 4/5  D/c cefepime 03/26 - may be contributing to confusion  Tolerating Meropenem started 03/26  MRI w/ contrast lumbar, pelvis 03/27 - Iliopsoas and gluteal fluid collections noted.    Drain placed " 3/29/2019  Cultures are negative so far  Repeat CT scan shows resolution of the abscess in the gluteus but increase in the iliopsoas  Another drain was placed on 4/4/2019 and it shows purulent fluid  Repeat CT from 4/8 with resolution of the fluid collections after drainage per my read  Aim  for at least 6 weeks of IV antibiotics (end date: 4/24/2018) likely followed by p.o. suppression given that the infection is adjacent to hardware  Consider repeat CHAS at some point given her multiple brain lesions suspicious for infective endocarditis  Continue broad-spectrum IV vancomycin and meropenem for now.  Not attempting de-escalation at this time since we have no positive cultures and given the consequences of worsening brain abscesses  Monitor renal function and Vanco trough    H/o breast cancer  Markedly elevated alk phos, trending down  Continued diaz for mets    Type 2 diabetes mellitus  Hemoglobin A1c 6.3  Keep BS under 150 to help control current infection    Altered mental status  Appears to be waxing and waning  Multifactorial, likely large contribution from her multiple brain abscesses, monitor    I have performed a physical exam and reviewed and updated ROS and plan today 4/11/2019.  In review of yesterday's note 4/10/2019, there are no changes except as documented above.    Continue current plan as outlined above    Discussed with IM Dr. Keller

## 2019-04-11 NOTE — DISCHARGE PLANNING
Poonam from [facility] called regarding referral. Had questions regarding patients PCP, oncologist and needs she spoke to [facility]. This RNCM attempted to reach her left message on her VM.

## 2019-04-11 NOTE — PROGRESS NOTES
Pharmacy Kinetics 68 y.o. female on vancomycin day # 18 2019    Currently on Vancomycin 600 mg iv q24hr    Indication for Treatment: possible brain abscesses    Pertinent history per medical record: Admitted on 3/8/2019 for iliopsoas mass and multiple brain lesions.  There is concern that this is infectious.  All cultures are negative to date, but were all drawn while on antibiotics. CHAS was negative.  Patient has a history of breast cancer and DM.  ID is following.  Plan for completion of abx (6 weeks) in house prior to transfer to [facility] in [place].      Other antibiotics: meropenem 2 gm IV q8h    Allergies: Patient has no known allergies.     List concerns for renal function: age, and prolonged vancomycin duration, elevated BUN/SCr ratio    Pertinent cultures to date:   3/11/19 wound - right gluteal mass: negative  3/12/19 blood - peripheral x 2: no growth  3/29/19 wound - right buttock: negative  3/31/19 blood - peripheral x 2: no growth  19 wound - retroperitoneal drain aspirate: negative    MRSA nares swab if pneumonia is a concern (ordered/positive/negative/n-a): n/a    Recent Labs      19   0256  04/10/19   0006  19   0025   WBC  4.6*  4.6*  4.2*   NEUTSPOLYS  63.50  55.60  71.20     Recent Labs      19   0256  04/10/19   0006  19   0025   BUN  15  19  20   CREATININE  0.63  0.65  0.64     Results for MARLENA VARGAS (MRN [id number]) as of 2019 09:35   Ref. Range 2019 17:26   Vancomycin Trough Latest Ref Range: 10.0 - 20.0 ug/mL 20.4 (H)       Intake/Output Summary (Last 24 hours) at 19 0934  Last data filed at 04/10/19 2147   Gross per 24 hour   Intake              586 ml   Output                0 ml   Net              586 ml      /69   Pulse (!) 107   Temp 36.2 °C (97.2 °F) (Oral)   Resp 18   Ht 1.524 m (5')   Wt 62.5 kg (137 lb 12.6 oz)   SpO2 94%  Temp (24hrs), Av.7 °C (98 °F), Min:36.2 °C (97.2 °F), Max:37.2 °C (98.9  °F)      A/P   1. Vancomycin dose change: continue same dose  2. Next vancomycin level: tonight @ 1900 (will be evaluated tomorrow morning)  3. Goal trough: 16-20 mcg/mL  4. Comments: renal function is stable.  Trough is scheduled for tonight.    Dulce Mills, PharmD, BCPS-ID

## 2019-04-12 LAB
ANION GAP SERPL CALC-SCNC: 5 MMOL/L (ref 0–11.9)
APPEARANCE UR: CLEAR
BACTERIA #/AREA URNS HPF: NEGATIVE /HPF
BASOPHILS # BLD AUTO: 0.8 % (ref 0–1.8)
BASOPHILS # BLD: 0.03 K/UL (ref 0–0.12)
BILIRUB UR QL STRIP.AUTO: NEGATIVE
BUN SERPL-MCNC: 13 MG/DL (ref 8–22)
CALCIUM SERPL-MCNC: 10.2 MG/DL (ref 8.5–10.5)
CHLORIDE SERPL-SCNC: 102 MMOL/L (ref 96–112)
CO2 SERPL-SCNC: 25 MMOL/L (ref 20–33)
COLOR UR: YELLOW
CREAT SERPL-MCNC: 0.44 MG/DL (ref 0.5–1.4)
EOSINOPHIL # BLD AUTO: 0.2 K/UL (ref 0–0.51)
EOSINOPHIL NFR BLD: 5.3 % (ref 0–6.9)
EPI CELLS #/AREA URNS HPF: NEGATIVE /HPF
ERYTHROCYTE [DISTWIDTH] IN BLOOD BY AUTOMATED COUNT: 50.2 FL (ref 35.9–50)
GLUCOSE SERPL-MCNC: 83 MG/DL (ref 65–99)
GLUCOSE UR STRIP.AUTO-MCNC: NEGATIVE MG/DL
HCT VFR BLD AUTO: 35.8 % (ref 37–47)
HGB BLD-MCNC: 11.1 G/DL (ref 12–16)
HYALINE CASTS #/AREA URNS LPF: ABNORMAL /LPF
IMM GRANULOCYTES # BLD AUTO: 0.01 K/UL (ref 0–0.11)
IMM GRANULOCYTES NFR BLD AUTO: 0.3 % (ref 0–0.9)
KETONES UR STRIP.AUTO-MCNC: NEGATIVE MG/DL
LEUKOCYTE ESTERASE UR QL STRIP.AUTO: ABNORMAL
LYMPHOCYTES # BLD AUTO: 0.63 K/UL (ref 1–4.8)
LYMPHOCYTES NFR BLD: 16.6 % (ref 22–41)
MCH RBC QN AUTO: 24.8 PG (ref 27–33)
MCHC RBC AUTO-ENTMCNC: 31 G/DL (ref 33.6–35)
MCV RBC AUTO: 80.1 FL (ref 81.4–97.8)
MICRO URNS: ABNORMAL
MONOCYTES # BLD AUTO: 0.61 K/UL (ref 0–0.85)
MONOCYTES NFR BLD AUTO: 16.1 % (ref 0–13.4)
NEUTROPHILS # BLD AUTO: 2.31 K/UL (ref 2–7.15)
NEUTROPHILS NFR BLD: 60.9 % (ref 44–72)
NITRITE UR QL STRIP.AUTO: NEGATIVE
NRBC # BLD AUTO: 0 K/UL
NRBC BLD-RTO: 0 /100 WBC
PH UR STRIP.AUTO: 7 [PH]
PLATELET # BLD AUTO: 104 K/UL (ref 164–446)
PMV BLD AUTO: 10 FL (ref 9–12.9)
POTASSIUM SERPL-SCNC: 4.4 MMOL/L (ref 3.6–5.5)
PROT UR QL STRIP: 30 MG/DL
RBC # BLD AUTO: 4.47 M/UL (ref 4.2–5.4)
RBC # URNS HPF: ABNORMAL /HPF
RBC UR QL AUTO: NEGATIVE
SODIUM SERPL-SCNC: 132 MMOL/L (ref 135–145)
SP GR UR STRIP.AUTO: 1.01
UROBILINOGEN UR STRIP.AUTO-MCNC: 0.2 MG/DL
WBC # BLD AUTO: 3.8 K/UL (ref 4.8–10.8)
WBC #/AREA URNS HPF: ABNORMAL /HPF
YEAST BUDDING URNS QL: PRESENT /HPF

## 2019-04-12 PROCEDURE — A9270 NON-COVERED ITEM OR SERVICE: HCPCS | Performed by: HOSPITALIST

## 2019-04-12 PROCEDURE — 99233 SBSQ HOSP IP/OBS HIGH 50: CPT | Performed by: INTERNAL MEDICINE

## 2019-04-12 PROCEDURE — 700102 HCHG RX REV CODE 250 W/ 637 OVERRIDE(OP): Performed by: INTERNAL MEDICINE

## 2019-04-12 PROCEDURE — 700105 HCHG RX REV CODE 258: Performed by: INTERNAL MEDICINE

## 2019-04-12 PROCEDURE — 700111 HCHG RX REV CODE 636 W/ 250 OVERRIDE (IP): Performed by: INTERNAL MEDICINE

## 2019-04-12 PROCEDURE — 700102 HCHG RX REV CODE 250 W/ 637 OVERRIDE(OP): Performed by: HOSPITALIST

## 2019-04-12 PROCEDURE — 99232 SBSQ HOSP IP/OBS MODERATE 35: CPT | Performed by: INTERNAL MEDICINE

## 2019-04-12 PROCEDURE — 85025 COMPLETE CBC W/AUTO DIFF WBC: CPT

## 2019-04-12 PROCEDURE — 97112 NEUROMUSCULAR REEDUCATION: CPT

## 2019-04-12 PROCEDURE — A9270 NON-COVERED ITEM OR SERVICE: HCPCS | Performed by: INTERNAL MEDICINE

## 2019-04-12 PROCEDURE — 97110 THERAPEUTIC EXERCISES: CPT

## 2019-04-12 PROCEDURE — 97530 THERAPEUTIC ACTIVITIES: CPT

## 2019-04-12 PROCEDURE — 80048 BASIC METABOLIC PNL TOTAL CA: CPT

## 2019-04-12 PROCEDURE — 81001 URINALYSIS AUTO W/SCOPE: CPT

## 2019-04-12 PROCEDURE — 36415 COLL VENOUS BLD VENIPUNCTURE: CPT

## 2019-04-12 PROCEDURE — 770004 HCHG ROOM/CARE - ONCOLOGY PRIVATE *

## 2019-04-12 RX ORDER — ACETAMINOPHEN 325 MG/1
650 TABLET ORAL EVERY 6 HOURS
Status: DISCONTINUED | OUTPATIENT
Start: 2019-04-12 | End: 2019-04-18

## 2019-04-12 RX ORDER — PRAMIPEXOLE DIHYDROCHLORIDE 0.25 MG/1
0.25 TABLET ORAL EVERY 8 HOURS PRN
Status: DISCONTINUED | OUTPATIENT
Start: 2019-04-12 | End: 2019-04-30 | Stop reason: HOSPADM

## 2019-04-12 RX ORDER — LABETALOL 200 MG/1
100 TABLET, FILM COATED ORAL EVERY 8 HOURS PRN
Status: DISCONTINUED | OUTPATIENT
Start: 2019-04-12 | End: 2019-04-30 | Stop reason: HOSPADM

## 2019-04-12 RX ORDER — OMEPRAZOLE 20 MG/1
20 CAPSULE, DELAYED RELEASE ORAL DAILY
Status: DISCONTINUED | OUTPATIENT
Start: 2019-04-13 | End: 2019-04-30 | Stop reason: HOSPADM

## 2019-04-12 RX ORDER — GABAPENTIN 300 MG/1
300 CAPSULE ORAL 2 TIMES DAILY
Status: DISCONTINUED | OUTPATIENT
Start: 2019-04-12 | End: 2019-04-30 | Stop reason: HOSPADM

## 2019-04-12 RX ORDER — ONDANSETRON 4 MG/1
4 TABLET, ORALLY DISINTEGRATING ORAL EVERY 4 HOURS PRN
Status: DISCONTINUED | OUTPATIENT
Start: 2019-04-12 | End: 2019-04-30 | Stop reason: HOSPADM

## 2019-04-12 RX ORDER — OXYCODONE HYDROCHLORIDE 5 MG/1
2.5 TABLET ORAL
Status: DISCONTINUED | OUTPATIENT
Start: 2019-04-12 | End: 2019-04-12

## 2019-04-12 RX ORDER — ACETAMINOPHEN 325 MG/1
650 TABLET ORAL EVERY 6 HOURS
Status: DISCONTINUED | OUTPATIENT
Start: 2019-04-12 | End: 2019-04-12

## 2019-04-12 RX ORDER — QUETIAPINE FUMARATE 25 MG/1
25 TABLET, FILM COATED ORAL EVERY 8 HOURS
Status: DISCONTINUED | OUTPATIENT
Start: 2019-04-12 | End: 2019-04-13

## 2019-04-12 RX ORDER — ONDANSETRON 2 MG/ML
4 INJECTION INTRAMUSCULAR; INTRAVENOUS EVERY 4 HOURS PRN
Status: DISCONTINUED | OUTPATIENT
Start: 2019-04-12 | End: 2019-04-30 | Stop reason: HOSPADM

## 2019-04-12 RX ORDER — POTASSIUM CHLORIDE 20 MEQ/1
40 TABLET, EXTENDED RELEASE ORAL 2 TIMES DAILY
Status: DISCONTINUED | OUTPATIENT
Start: 2019-04-12 | End: 2019-04-21

## 2019-04-12 RX ORDER — LOPERAMIDE HYDROCHLORIDE 2 MG/1
2 CAPSULE ORAL 4 TIMES DAILY PRN
Status: DISCONTINUED | OUTPATIENT
Start: 2019-04-12 | End: 2019-04-30 | Stop reason: HOSPADM

## 2019-04-12 RX ORDER — MORPHINE SULFATE 4 MG/ML
2 INJECTION, SOLUTION INTRAMUSCULAR; INTRAVENOUS
Status: DISCONTINUED | OUTPATIENT
Start: 2019-04-12 | End: 2019-04-12

## 2019-04-12 RX ORDER — IBUPROFEN 600 MG/1
600 TABLET ORAL EVERY 6 HOURS PRN
Status: DISCONTINUED | OUTPATIENT
Start: 2019-04-12 | End: 2019-04-16

## 2019-04-12 RX ORDER — TRAMADOL HYDROCHLORIDE 50 MG/1
50 TABLET ORAL EVERY 6 HOURS PRN
Status: DISCONTINUED | OUTPATIENT
Start: 2019-04-12 | End: 2019-04-12

## 2019-04-12 RX ORDER — FUROSEMIDE 20 MG/1
20 TABLET ORAL
Status: DISCONTINUED | OUTPATIENT
Start: 2019-04-13 | End: 2019-04-16

## 2019-04-12 RX ORDER — LOSARTAN POTASSIUM 50 MG/1
50 TABLET ORAL DAILY
Status: DISCONTINUED | OUTPATIENT
Start: 2019-04-13 | End: 2019-04-30 | Stop reason: HOSPADM

## 2019-04-12 RX ORDER — HALOPERIDOL 5 MG/ML
5 INJECTION INTRAMUSCULAR EVERY 6 HOURS PRN
Status: DISCONTINUED | OUTPATIENT
Start: 2019-04-12 | End: 2019-04-15

## 2019-04-12 RX ADMIN — POTASSIUM CHLORIDE 40 MEQ: 1500 TABLET, EXTENDED RELEASE ORAL at 17:37

## 2019-04-12 RX ADMIN — IBUPROFEN 600 MG: 400 TABLET, FILM COATED ORAL at 19:54

## 2019-04-12 RX ADMIN — MEROPENEM 2 G: 1 INJECTION, POWDER, FOR SOLUTION INTRAVENOUS at 06:06

## 2019-04-12 RX ADMIN — OMEPRAZOLE 20 MG: 20 CAPSULE, DELAYED RELEASE ORAL at 06:04

## 2019-04-12 RX ADMIN — ACETAMINOPHEN 650 MG: 325 TABLET, FILM COATED ORAL at 11:35

## 2019-04-12 RX ADMIN — NYSTATIN: 100000 POWDER TOPICAL at 06:04

## 2019-04-12 RX ADMIN — QUETIAPINE FUMARATE 25 MG: 25 TABLET ORAL at 19:54

## 2019-04-12 RX ADMIN — POTASSIUM CHLORIDE 40 MEQ: 1500 TABLET, EXTENDED RELEASE ORAL at 06:04

## 2019-04-12 RX ADMIN — VANCOMYCIN HYDROCHLORIDE 600 MG: 100 INJECTION, POWDER, LYOPHILIZED, FOR SOLUTION INTRAVENOUS at 19:55

## 2019-04-12 RX ADMIN — LOSARTAN POTASSIUM 50 MG: 50 TABLET ORAL at 06:04

## 2019-04-12 RX ADMIN — ACETAMINOPHEN 650 MG: 325 TABLET, FILM COATED ORAL at 17:40

## 2019-04-12 RX ADMIN — GABAPENTIN 300 MG: 300 CAPSULE ORAL at 17:40

## 2019-04-12 RX ADMIN — FUROSEMIDE 20 MG: 20 TABLET ORAL at 06:04

## 2019-04-12 RX ADMIN — GABAPENTIN 300 MG: 300 CAPSULE ORAL at 06:04

## 2019-04-12 ASSESSMENT — ENCOUNTER SYMPTOMS
PHOTOPHOBIA: 0
WEAKNESS: 0
CONSTIPATION: 0
BLURRED VISION: 0
INSOMNIA: 0
DIZZINESS: 0
VOMITING: 0
MYALGIAS: 1
COUGH: 0
CLAUDICATION: 0
BACK PAIN: 0
HEARTBURN: 0
SENSORY CHANGE: 0
NAUSEA: 0
HEADACHES: 0
NERVOUS/ANXIOUS: 0
SORE THROAT: 0
ABDOMINAL PAIN: 0
DEPRESSION: 0
SPEECH CHANGE: 0
CHILLS: 0
FEVER: 0
SHORTNESS OF BREATH: 0
DIARRHEA: 0

## 2019-04-12 ASSESSMENT — COGNITIVE AND FUNCTIONAL STATUS - GENERAL
TURNING FROM BACK TO SIDE WHILE IN FLAT BAD: UNABLE
MOVING TO AND FROM BED TO CHAIR: UNABLE
MOVING FROM LYING ON BACK TO SITTING ON SIDE OF FLAT BED: UNABLE
STANDING UP FROM CHAIR USING ARMS: TOTAL
WALKING IN HOSPITAL ROOM: TOTAL
MOBILITY SCORE: 6
CLIMB 3 TO 5 STEPS WITH RAILING: TOTAL
SUGGESTED CMS G CODE MODIFIER MOBILITY: CN

## 2019-04-12 ASSESSMENT — GAIT ASSESSMENTS: GAIT LEVEL OF ASSIST: UNABLE TO PARTICIPATE

## 2019-04-12 NOTE — PROGRESS NOTES
· 2 RN skin check complete with PATRICK Whitfield.  · Devices in place: Nasal cannula.  · Skin assessed under devices: yes; no issues noted.  · Confirmed pressure ulcers found: none.  · Bilateral elbows red and blanchable  · Bilateral heels boggy, red, blanchable  · Redness and new skin tear to coccyx.    The following interventions in place: full waffle in place, 2RN skin assessments q shift, q 2 hr turns in effect, ENRRIQUE cream in use, barrier wipes

## 2019-04-12 NOTE — PROGRESS NOTES
· 2 RN skin check complete with PATRICK Monahan.  · Devices in place: NC.  · Skin assessed under devices: no issues noted.  · Confirmed pressure ulcers found on: none.  · Redness and excoriated noted to buttocks, no new kin breakdown noted  · The following interventions in place: full waffle in place, 2RN skin assessments q shift, q 2 hr turns in effect, ENRRIQUE cream in use

## 2019-04-12 NOTE — PROGRESS NOTES
Infectious Disease Progress Note    Author: Beatriz Johnson M.D. Date & Time of service: 4/12/2019  12:54 PM    Chief Complaint:  Multiple brain lesions  Iliopsoas lesion    Interval History:  68-year-old female with history of diabetes and breast cancer, admitted 3/8/2019 with abdominal pain, iliopsoas lesion, and an abnormal MRI with multiple brain lesions.  4/1/2019 no fevers.  Easily arousable.  ÁNGEL continues to drain.  4/2/2019-no fevers.  Awake and responsive but feels weak.  There is minimal drainage from the ÁNGEL.  As per the nursing the patient is incontinent of stool.  But when you talk to her she states she is just is not able to get up on time.  4/3/2019-complains of some weakness in the hand. No fevers. The drain is not draining much  4/4- no fevers. She is anxious since she has been NPO. Awaiting IR drain  4/5 no fevers.  Got her drain.  There was purulent fluid.  WBCs 4.6.  4/8 afebrile, white count 3.3.  IR drain remains in place.  Continues to have significant word finding difficulty.  4/9 per RN, patient more awake and interactive but remains confused with word finding difficulty.  Resting comfortably this morning  4/10 afebrile, white count 4.6.  Patient much more awake and interactive today.  Reports no new issues, reports no pain anywhere.  IR drain has been removed.  4/11 afebrile WBC 4.2 patient complains of pain in her legs and feet.  She is being assisted with feeding.  Denies any headaches or neck pain  4/12/2019 no fevers.  Patient is alert and oriented.  Although really slow.  Apparently when she gets pain medications she becomes confused.  But this morning she is answering all questions appropriately.    Labs Reviewed    Review of Systems:  Review of Systems   Constitutional: Positive for malaise/fatigue. Negative for fever.   HENT: Negative for hearing loss.    Respiratory: Negative for cough and shortness of breath.    Cardiovascular: Negative for chest pain and leg swelling.    Gastrointestinal: Negative for nausea and vomiting.   Genitourinary: Negative for dysuria.   Musculoskeletal: Positive for myalgias.        Complains of some hip pain   Neurological: Negative for dizziness, sensory change, speech change and headaches.   Limited review of systems secondary to intermittent confusion and word finding difficulty    Hemodynamics:  Temp (24hrs), Av.8 °C (98.3 °F), Min:36.6 °C (97.9 °F), Max:37.1 °C (98.8 °F)  Temperature: 36.7 °C (98.1 °F)  Pulse  Av.1  Min: 70  Max: 153  Blood Pressure : 118/62       Physical Exam:  Physical Exam   Constitutional: She is oriented to person, place, and time. She appears well-developed and well-nourished. No distress.   HENT:   Head: Normocephalic and atraumatic.   Eyes: Pupils are equal, round, and reactive to light. EOM are normal. Right eye exhibits no discharge. Left eye exhibits no discharge.   Neck: Neck supple.   Cardiovascular: Normal rate and intact distal pulses.    Murmur heard.  Pulmonary/Chest: Effort normal and breath sounds normal. No respiratory distress. She has no wheezes.   Abdominal: Soft. Bowel sounds are normal. She exhibits no distension. There is no tenderness.   Musculoskeletal: She exhibits no edema, tenderness or deformity.   Right upper extremity PICC line   Lymphadenopathy:     She has no cervical adenopathy.   Neurological: She is alert and oriented to person, place, and time.   Moves all extremities   Skin: Skin is warm. No rash noted. She is not diaphoretic.   Nursing note and vitals reviewed.    Meds:    Current Facility-Administered Medications:   •  acetaminophen  •  [START ON 2019] furosemide  •  haloperidol lactate  •  gabapentin  •  ibuprofen  •  labetalol  •  loperamide  •  [START ON 2019] losartan  •  MD Alert...Vancomycin per Pharmacy  •  Notify provider if pain remains uncontrolled **AND** Use the numeric rating scale (NRS-11) on regular floors and Critical-Care Pain Observation Tool (CPOT) on  ICUs/Trauma to assess pain **AND** Pulse Ox (Oximetry) **AND** Pharmacy Consult Request **AND** If patient difficult to arouse and/or has respiratory depression, stop any opiates that are currently infusing and call a Rapid Response. **AND** oxyCODONE immediate-release **AND** [DISCONTINUED] oxyCODONE immediate-release **AND** morphine injection  •  [START ON 4/13/2019] omeprazole  •  ondansetron  •  ondansetron  •  potassium chloride SA  •  pramipexole  •  Respiratory Care per Protocol  •  QUEtiapine  •  tramadol  •  vancomycin  •  nystatin    Labs:  Recent Labs      04/10/19   0006  04/11/19 0025 04/12/19 0028   WBC  4.6*  4.2*  3.8*   RBC  5.03  4.92  4.47   HEMOGLOBIN  12.3  12.4  11.1*   HEMATOCRIT  40.7  39.2  35.8*   MCV  80.9*  79.7*  80.1*   MCH  24.5*  25.2*  24.8*   RDW  51.6*  50.5*  50.2*   PLATELETCT  133*  108*  104*   MPV  10.2  10.4  10.0   NEUTSPOLYS  55.60  71.20  60.90   LYMPHOCYTES  18.70*  10.80*  16.60*   MONOCYTES  19.60*  11.70  16.10*   EOSINOPHILS  5.00  5.40  5.30   BASOPHILS  0.70  0.90  0.80   RBCMORPHOLO   --   Present   --      Recent Labs      04/10/19   0006  04/11/19 0025  04/12/19   0028   SODIUM  133*  132*  132*   POTASSIUM  4.5  4.6  4.4   CHLORIDE  101  101  102   CO2  24  25  25   GLUCOSE  85  93  83   BUN  19  20  13     Recent Labs      04/10/19   0006  04/11/19   0025  04/12/19   0028   CREATININE  0.65  0.64  0.44*       Imaging:  Ct-needle Bx-muscle Right    Result Date: 3/13/2019  3/11/2019 4:44 PM HISTORY/REASON FOR EXAM:  Right gluteal hypodense lesion. Moderate sedation was administered with continuous monitoring of the patient under the direct supervision of  Medications: 2 mg of Versed and 200 mcg of fentanyl Total sedation time 60 minutes Procedure: After the informed consent the patient was placed on the CT table on prone position. Localization CT scan was obtained. It demonstrates hypodense area in the right gluteus muscle. Subsequently a  17-gauge needle was advanced into the lesion. 20  mL of pus was aspirated. The materials were sent to the pathology. The patient tolerated the procedure without any immediate competition.     CT-guided aspiration of pus like material in the right gluteal muscle lesion.    Ec-echocardiogram Complete W/o Cont    Result Date: 3/13/2019  Transthoracic Echo Report Echocardiography Laboratory CONCLUSIONS No prior study is available for comparison. Normal left ventricular systolic function. Left ventricular ejection fraction is visually estimated to be 65%. Normal diastolic function. Normal inferior vena cava size and inspiratory collapse. Aortic sclerosis without stenosis. Estimated right ventricular systolic pressure  is 33 mmHg. No obvious valvular vegetations are noted on a decent quality study.  If further valvular assessment is clinically indicated, consider transesophageal echocardiogram. SAM,  LV EF:  65    % FINDINGS Left Ventricle Normal left ventricular size and systolic function. Normal left ventricular wall thickness. Left ventricular ejection fraction is visually estimated to be 65%. Normal diastolic function. Right Ventricle Normal right ventricular size and systolic function. Right Atrium Normal right atrial size. Normal inferior vena cava size and inspiratory collapse. Left Atrium Normal left atrial size. Mitral Valve Structurally normal mitral valve without significant stenosis or regurgitation. Aortic Valve Aortic sclerosis without stenosis. No aortic insufficiency. Tricuspid Valve Structurally normal tricuspid valve. Mild tricuspid regurgitation. Right atrial pressure is estimated to be 3 mmHg. Estimated right ventricular systolic pressure  is 33 mmHg. Pulmonic Valve The pulmonic valve is not well visualized. No pulmonic insufficiency. Pericardium Normal pericardium without effusion. Aorta The aortic root is normal.  Ascending aorta diameter is 3.0 cm. Claire Tripathi MD (Electronically Signed) Final  "Date:     13 March 2019                 14:38      Micro:  Results     Procedure Component Value Units Date/Time    URINALYSIS [[id number]]  (Abnormal) Collected:  04/12/19 1148    Order Status:  Completed Specimen:  Urine from Urine, Cath Updated:  04/12/19 1246     Color Yellow     Character Clear     Specific Gravity 1.011     Ph 7.0     Glucose Negative mg/dL      Ketones Negative mg/dL      Protein 30 (A) mg/dL      Bilirubin Negative     Urobilinogen, Urine 0.2     Nitrite Negative     Leukocyte Esterase Trace (A)     Occult Blood Negative     Micro Urine Req Microscopic    Narrative:       Collected By:[id number] [employer]    CULTURE WOUND W/ GRAM STAIN [[id number]] Collected:  04/04/19 1931    Order Status:  Completed Specimen:  Wound Updated:  04/07/19 0731     Significant Indicator NEG     Source WND     Site Retroperitoneal Drain Aspirate     Culture Result Wound No growth at 72 hours     Gram Stain Result Many WBCs.  No organisms seen.      BLOOD CULTURE [[id number]] Collected:  03/31/19 1504    Order Status:  Completed Specimen:  Blood from Peripheral Updated:  04/05/19 1900     Significant Indicator NEG     Source BLD     Site PERIPHERAL     Blood Culture No growth after 5 days of incubation.    Narrative:       Per Hospital Policy: Only change Specimen Src: to \"Line\" if  specified by physician order.    BLOOD CULTURE [[id number]] Collected:  03/31/19 1504    Order Status:  Completed Specimen:  Blood from Peripheral Updated:  04/05/19 1900     Significant Indicator NEG     Source BLD     Site PERIPHERAL     Blood Culture No growth after 5 days of incubation.    Narrative:       Per Hospital Policy: Only change Specimen Src: to \"Line\" if  specified by physician order.          Assessment:  Active Hospital Problems    Diagnosis   • *Lesion of brain [G93.9]   • Mass of iliopsoas muscle group [M62.89]   • Encephalopathy [G93.40]   •    • History of breast cancer [Z85.3]   • Diabetes mellitus type 2 in obese " (HCC) [E11.69, E66.9]       Plan:  Multiple brain lesions-likely abscesses versus metastatic lesions  Leukopenia  Confusion improving  MRI with scattered small lesions incl aaron, too small to biopsy per neurosurgery  TTE neg; CHAS neg  Bcxs neg to date  Repeat CT scan on 3/30/2019 shows decrease in the size of the brain lesions, supporting that these are abscesses  Repeat MRI brain prior to completion of abx    Iliopsoas abscess, on treatment  CT + 2.6 cm lesion in the right iliopsoas region  S/p aspiration +WBCs-cultures negative to date  Bcxs remain neg  Last vanc trough of 20.4 and 4/5  D/c cefepime 03/26 - may be contributing to confusion  Tolerating Meropenem started 03/26  MRI w/ contrast lumbar, pelvis 03/27 - Iliopsoas and gluteal fluid collections noted.    Drain placed 3/29/2019  Cultures are negative so far  Repeat CT scan shows resolution of the abscess in the gluteus but increase in the iliopsoas  Another drain was placed on 4/4/2019 and it shows purulent fluid  Repeat CT from 4/8 with resolution of the fluid collections after drainage per my read  Aim  for at least 6 weeks of IV antibiotics (end date: 4/24/2018) likely followed by p.o. suppression given that the infection is adjacent to hardware  Consider repeat CHAS at some point given her multiple brain lesions suspicious for infective endocarditis  Continue broad-spectrum IV vancomycin and meropenem for now.  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  I spoke to orthopedics about logistics of taking the hardware out.    H/o breast cancer  Markedly elevated alk phos, trending down  Continued diaz for mets    Type 2 diabetes mellitus  Hemoglobin A1c 6.3  Keep BS under 150 to help control current infection    Altered mental status  Appears to be  waxing and waning  Multifactorial, likely large contribution from her multiple brain abscesses, monitor    I have performed a physical exam and reviewed and updated ROS and plan today 4/12/2019.  In review of yesterday's note 4/11/2019, there are no changes except as documented above.    Continue current plan as outlined above    Discussed with IM Dr. Keller

## 2019-04-12 NOTE — PROGRESS NOTES
Pharmacy Kinetics 68 y.o. female on vancomycin day # 19 2019    Currently on Vancomycin 600 mg iv q24hr    Indication for Treatment:  possible brain abscesses     Pertinent history per medical record: Admitted on 3/8/2019 for iliopsoas mass and multiple brain lesions.  There is concern that this is infectious.  All cultures are negative to date, but were all drawn while on antibiotics. CHAS was negative.  Patient has a history of breast cancer and DM.  ID is following.  Plan for completion of abx (6 weeks) in house prior to transfer to [facility] in [place].       Other antibiotics: meropenem 2 gm IV q8h     Allergies: Patient has no known allergies.      List concerns for renal function: age, and prolonged vancomycin duration, elevated BUN/SCr ratio     Pertinent cultures to date:   3/11/19 wound - right gluteal mass: negative  3/12/19 blood - peripheral x 2: negative  3/29/19 wound - right buttock: negative  3/31/19 blood - peripheral x 2: negative   19 wound - retroperitoneal drain aspirate: negative     MRSA nares swab if pneumonia is a concern (ordered/positive/negative/n-a): n/a    Recent Labs      04/10/19   0006  19   0025  19   0028   WBC  4.6*  4.2*  3.8*   NEUTSPOLYS  55.60  71.20  60.90     Recent Labs      04/10/19   0006  19   0025  19   0028   BUN  19  20  13   CREATININE  0.65  0.64  0.44*     Recent Labs      19   1929   [facility]  17.2     Intake/Output Summary (Last 24 hours) at 19 0804  Last data filed at 19 2305   Gross per 24 hour   Intake             1120 ml   Output                0 ml   Net             1120 ml      /62   Pulse 84   Temp 36.7 °C (98.1 °F) (Temporal)   Resp 18   Ht 1.524 m (5')   Wt 62.5 kg (137 lb 12.6 oz)   SpO2 99%  Temp (24hrs), Av.7 °C (98 °F), Min:36.2 °C (97.2 °F), Max:37.1 °C (98.8 °F)      A/P   1. Vancomycin dose change: continue vancomycin 600 mg IV q24h (2100)  2. Next vancomycin level: ~5 days    3. Goal trough: 16-20 mcg/mL  4. Comments: Therapeutic trough level at steady state. Doses charted appropriately.   Renal indices stable, I/O's are incomplete. No changes to current plan.  Anticipate repeat trough in ~ 5 days, sooner if clinically indicated.     Joanna Lezama, PharmD, BCOP

## 2019-04-12 NOTE — DISCHARGE PLANNING
Agency/Facility Name: [facility] ([facility]) [place]  Outcome: no answer. Left a message with admissions to call [facility] back.     Rufina CARLOS notified.

## 2019-04-12 NOTE — THERAPY
"Physical Therapy Treatment completed.   Bed Mobility:  Supine to Sit: Maximal Assist  Transfers: Sit to Stand: Moderate Assist  Gait: Level Of Assist: Unable to Participate with Will Continue to Assess for Equipment Needs       Plan of Care: Will benefit from Physical Therapy 4 times per week  Discharge Recommendations: Equipment: Will Continue to Assess for Equipment Needs.Recommend inpatient transitional care services for continued physical therapy services.      See \"Rehab Therapy-Acute\" Patient Summary Report for complete documentation.     Pt is progressing slower than expected with functional mobility and appears to have deconditioned from pervious sessions. Pt was able to tolerate sitting ther-ex well and follow commands well. Pt required Mod A for sit<>stands, with Min to Mod VC's and demos for appropriate set up. Pt demonstrated with strong posterior lean upon standing and was encouarged to lean forward to bring COG over hips. Pt able to tolerate weight shift in standing with assist from therapist. Pt unable to demonstrate appropriate marching in place or attempt ambulation. Pt appears to be primarily limited due to weakness and poor motor planning/sequencing. Pt was able to transfer to [facility] with 2 person max A. During transfer pt was provided with tactile cues at RLE and pt was able to demonstrate short steps , however, poor kyphotic posture and required max A to maintain upright posture during transfer. Pt was able to tolerate 5 mins of static standing during pericare and then able to demonstrate short steps to transfer back to chair. Pt will continue to benefit from skilled PT while in house, with recommendation for post acute thearpy prior to d/c home. Will continue working towards current goals.   "

## 2019-04-12 NOTE — DISCHARGE PLANNING
Agency/Facility Name: [facility]   Spoke To: admissions  Outcome: Referral under review. Admissions will not be able to give an answer until the middle of next week.     Rufina CARLOS notified.

## 2019-04-12 NOTE — PROGRESS NOTES
RN received report assumed patient care. Patient is resting in bed, sleeping at this time. Call light within reach. All needs met at this time.

## 2019-04-12 NOTE — PROGRESS NOTES
Pharmacy Pharmacotherapy Consult for LOS >30 days    Admit Date: 3/8/2019      Medications were reviewed for appropriateness and ongoing need.     Current Facility-Administered Medications   Medication Dose Route Frequency Provider Last Rate Last Dose   • acetaminophen (TYLENOL) tablet 650 mg  650 mg Oral Q6HRS Jaimee Keller D.O.   650 mg at 04/12/19 1135   • meropenem (MERREM) 2 g in  mL IVPB  2,000 mg Intravenous Q8HRS Jaimee Keller D.O.   Stopped at 04/12/19 0906   • QUEtiapine (SEROQUEL) tablet 25 mg  25 mg Oral Q8HR Darlyn Patricia D.O.   Stopped at 04/12/19 0325   • furosemide (LASIX) tablet 20 mg  20 mg Oral Q DAY Darlyn Patricia D.O.   20 mg at 04/12/19 0604   • vancomycin 600 mg in  mL IVPB  600 mg Intravenous Q24HR Beatriz Johnson M.D.   Stopped at 04/11/19 2305   • potassium chloride SA (Kdur) tablet 40 mEq  40 mEq Oral BID Darlyn Patricia D.O.   40 mEq at 04/12/19 0604   • loperamide (IMODIUM) capsule 2 mg  2 mg Oral 4X/DAY PRN Jaimee Keller D.O.   2 mg at 04/01/19 1221   • MD Alert...Vancomycin per Pharmacy   Other PHARMACY TO DOSE Beatriz Johnson M.D.       • nystatin (MYCOSTATIN) powder   Topical BID Yahir Ramírez M.D.       • labetalol (NORMODYNE) tablet 100 mg  100 mg Oral Q8HRS PRN Raina Magdaleno M.D.   100 mg at 04/05/19 1851   • ibuprofen (MOTRIN) tablet 600 mg  600 mg Oral Q6HRS PRN Raina Magdaleno M.D.   600 mg at 04/07/19 1617   • haloperidol lactate (HALDOL) injection 5 mg  5 mg Intravenous Q6HRS PRN Jaimee Keller D.O.   5 mg at 04/09/19 0830   • pramipexole (MIRAPEX) tablet 0.25 mg  0.25 mg Oral Q8HRS PRN Jaimee Keller D.O.   0.25 mg at 04/07/19 1442   • tramadol (ULTRAM) 50 MG tablet 50 mg  50 mg Oral Q6HRS PRN Mellissa Loco M.D.   50 mg at 04/10/19 1830   • losartan (COZAAR) tablet 50 mg  50 mg Oral DAILY Mellissa Loco M.D.   50 mg at 04/12/19 0604   • gabapentin (NEURONTIN) capsule 300 mg  300 mg Oral BID Rafal Parish M.D.   300 mg at 04/12/19 0604   • omeprazole  (PRILOSEC) capsule 20 mg  20 mg Oral DAILY Rafal Parish M.D.   20 mg at 04/12/19 0604   • Respiratory Care per Protocol   Nebulization Continuous RT Rafal Parish M.D.       • Pharmacy Consult Request ...Pain Management Review 1 Each  1 Each Other PHARMACY TO DOSE Rafal Parish M.D.        And   • oxyCODONE immediate-release (ROXICODONE) tablet 2.5 mg  2.5 mg Oral Q3HRS PRN Rafal Parish M.D.   2.5 mg at 04/11/19 1154    And   • morphine (pf) 4 mg/ml injection 2 mg  2 mg Intravenous Q3HRS PRN Rafal Parish M.D.   2 mg at 04/11/19 1403   • ondansetron (ZOFRAN) syringe/vial injection 4 mg  4 mg Intravenous Q4HRS PRN Rafal Parish M.D.       • ondansetron (ZOFRAN ODT) dispertab 4 mg  4 mg Oral Q4HRS PRN Rafal Parish M.D.           Recommendations:  1. D/c ondansetron , pt never used  2. D/c nystatin powder, today is day 21 of therapy    Discussed above recommendations with Dr. Keller.  Will d/c nystatin and continue ondansetron in case pt requires dose for nausea.     Joanna Lezama, PharmD, BCOP

## 2019-04-12 NOTE — CARE PLAN
Problem: Skin Integrity  Goal: Risk for impaired skin integrity will decrease  Outcome: PROGRESSING SLOWER THAN EXPECTED  2 RN skin check q shift- new skin tear to coccyx noted. Waffle cushion, q2h turns, torrey cream.     Problem: Pain Management  Goal: Pain level will decrease to patient's comfort goal  Outcome: PROGRESSING AS EXPECTED  Pt has been calm and sleeping majority of shift. No evidence of pain observed

## 2019-04-12 NOTE — PROGRESS NOTES
"[facility] Medicine Daily Progress Note    Date of Service  4/12/2019    Chief Complaint  68 y.o. female admitted 3/8/2019 with right hip pain, fever and abnormal brain mri.    Hospital Course    Patient started on empiric antibiotics given fever.  She had abnormal findings on MRI brain and CT showed \"lesion\" on right iliopsoas.  This lesion was biopsied and turned out to be abscess.  She has history of breast cancer and there is also concern of brain lesions being metastatic though their appearance is not typical of this.      Interval Problem Update  3/12 Discussed with Dr Turner for second opinion of brain lesions and based on appearance not able to r/o or r/i any diagnosis.  Given patient's presentation of confusion, hip pain and fever  - this is more supportive of infectious etiology rather than malignant.  Fluid from right gluteal area sent to micro and as of yet - no growth.  Continue zosyn for now.  3/13 Patient with slight improvement in mentation.  Blood cultures and abscess cultures are showing no growth at this time.  TTE being done while I examined patient - no evidence of vegetations on this test.  ID consultation pending - d/w Dr Garcia.  3/14 Patient feeling well today, her pain is present but not as bad as prior to admission.  She was able to follow the conversation today and is agreeable to move forward with the CHAS tomorrow.  I spoke with her brother, René, and updated him on condition yesterday afternoon also.  Will also have PICC placed in next few days as long as blood cultures remain negative as I expect a prolonged antibiotic course of treatment.  3/15 Patient without new complaints, states she needs help to move in bed.  CHAS showed no evidence of vegetations and regular diet resumed.  Culture remains negative and biopsy of brain lesion may prove needed - will watch through the weekend and if mentation does not continue to improve, will discuss with neurosurgery on Monday.  3/16 Patient states " she is okay today, mentation has waxed and waned without significant improvement.  She was febrile earlier today.  No other acute events.  3/17 Patient with significant improvement in mentation today, she is aware of her hospitalization, not falling asleep mid sentence.  Her pain mediations were held most of yesterday and likely far too strong for patient and were affecting her awareness.  Oxycodone 5 discontinued and will use tramadol instead unless pain not well controlled.  CT brain with contrast ordered as a quick scan for comparison to MRI.    3/18 Patient feeling same today, discussed need to discuss with neurosurgery for possible biopsy.  Discussed with Dr Nguyen, he reviewed MRI and CT brain and he feels they are likely metastatic lesions but are far too small to biopsy.  His recommendation is to continue with antibiotics and re-image in 2 weeks to see if any change that would be amenable to biopsy or that would further declare infection.  3/26 Patient mental status continues to wax and wane.  ID ordered MRI lumbar, pelvis and sacrum for evaluation for source of infection, patient with continued low back pain and no real improvement since I saw her 7 days prior with continued antibiotics during this time.  Cultures have not growth pathogen, repeat MRI 3/28.   3/27 Patient somnolent most of the day today, MRI's completed and showing 2 areas of fluid collections, given her history are likely abscesses and will require drainage, CT guided drainage requested and patient NPO at MN for this.  D/w ID - cultures will be done on fluid collection.  Repeat CT brain ordered for tomorrow.  3/28 Patient up to chair, diet resumed as lovenox given this am and drainage of abscesses deferred until tomorrow.  Vanco/merrem to continue and cultures will be collected from abscesses.  3/29 Patient went to IR today, had drain placed in the right buttock into abscess cavity and fluid sent for culture.  Potassium is slightly low,  continue with PO replacement.  HR has improved from yesterday but patient PO intake still not at normal level, increase IVF rate to 100cc/hour.  3/30 Patient without fever since drain placed right buttock.  Purulent material in ÁNGEL bulb. Cultures thus far are still showing no growth.  Antibiotics to continue.  CT head ordered to evaluate change in past 2 weeks as recommended by neurosurgery.  3/31 Patient with fevers overnight - was altered during this time but has recovered.  She denies pain when examined by me.  She still has purulent material in the drainage tubing.  Will continue with current IV antibiotics - 6 weeks total antibiotics suspected - end date would be 4/24/19.  D/w ID.  4/1 Patient remains intermittently somnolent.  She wakes easily but falls back to sleep quickly.  She remained afebrile overnight.  ÁNGEL drain continues to drain purulent material.  ABX through 4/24 - once accepting facility found, patient okay to transfer with midline intact for completion of antibiotics.    4/9 Patient somnolent when examined.  CT showed resolution of fluid collection where ÁNGEL in place.  RN to remove ÁNGEL today.  All cultures still remain without growth.  4/10 Patient seen when working with OT, she is awake but gets off task quickly and is easily fatigued.  ÁNGEL drain removed yesterday.  Patient states she has pain in the lumbar spine when she coughs - just above her surgical area.  She has been in bed for nearly 1 month and this is likely not helping her back pain - educated need for OOB as often as tolerated.  4/11 Patient had bowel incontinence prior to my entering room, states she thinks she needs a bed pan.  CNA notified.  She continues to have low back pain in the area of her surgery and given her bed-bound status during her infection and intermittent confusion, this is not unexpected.  Continuing with therapy recommended and patient is 1:1 feeding given her weakness to feed herself.  4/12 Patient tearful this am,  she is concerned that she will not walk again, support given and educated that she is weak and the inability to walk is only temporary.  She needs to work with therapy and get stronger and I need to stop her narcotics and only give tyelnol/ibuprofen for pain management along with non-narcotic alternatives.  I spoke to patient's brother, René, at her request.    Consultants/Specialty  ID - Sarah/Ashish    Code Status  full    Disposition  SNF/[facility] in [place]    Review of Systems  Review of Systems   Constitutional: Negative for chills and fever.   HENT: Negative for congestion and sore throat.    Eyes: Negative for blurred vision and photophobia.   Respiratory: Negative for cough and shortness of breath.    Cardiovascular: Negative for chest pain, claudication and leg swelling.   Gastrointestinal: Negative for abdominal pain, constipation, diarrhea, heartburn, nausea and vomiting.   Genitourinary: Negative for dysuria and hematuria.   Musculoskeletal: Positive for myalgias. Negative for back pain (lumbar/sacral area) and joint pain.   Skin: Negative for itching and rash.   Neurological: Negative for dizziness, sensory change, speech change, weakness and headaches.   Psychiatric/Behavioral: Negative for depression. The patient is not nervous/anxious and does not have insomnia.         Physical Exam  Temp:  [36.6 °C (97.9 °F)-37.1 °C (98.8 °F)] 36.7 °C (98.1 °F)  Pulse:  [84-90] 84  Resp:  [18-20] 18  BP: (118-131)/(55-62) 118/62  SpO2:  [99 %-100 %] 99 %    Physical Exam   Constitutional: She is oriented to person, place, and time. She appears well-developed and well-nourished. No distress.   HENT:   Head: Normocephalic and atraumatic.   Eyes: Conjunctivae are normal. No scleral icterus.   Neck: Neck supple. No JVD present.   Cardiovascular: Normal rate, regular rhythm and normal heart sounds.  Exam reveals no gallop and no friction rub.    No murmur heard.  Pulmonary/Chest: Effort normal and breath sounds  normal. No respiratory distress. She exhibits no tenderness.   Abdominal: Soft. Bowel sounds are normal. She exhibits no distension. There is no guarding.   Musculoskeletal: She exhibits no edema or tenderness.   ÁNGEL drain removed       Lymphadenopathy:     She has no cervical adenopathy.   Neurological: She is alert and oriented to person, place, and time. No cranial nerve deficit.   Mentation labile     Skin: Skin is warm and dry. She is not diaphoretic. No erythema. No pallor.   Psychiatric: She has a normal mood and affect. Her behavior is normal.   Nursing note and vitals reviewed.      Fluids    Intake/Output Summary (Last 24 hours) at 04/12/19 1437  Last data filed at 04/11/19 2305   Gross per 24 hour   Intake              260 ml   Output                0 ml   Net              260 ml       Laboratory  Recent Labs      04/10/19   0006  04/11/19   0025  04/12/19   0028   WBC  4.6*  4.2*  3.8*   RBC  5.03  4.92  4.47   HEMOGLOBIN  12.3  12.4  11.1*   HEMATOCRIT  40.7  39.2  35.8*   MCV  80.9*  79.7*  80.1*   MCH  24.5*  25.2*  24.8*   MCHC  30.2*  31.6*  31.0*   RDW  51.6*  50.5*  50.2*   PLATELETCT  133*  108*  104*   MPV  10.2  10.4  10.0     Recent Labs      04/10/19   0006  04/11/19   0025  04/12/19   0028   SODIUM  133*  132*  132*   POTASSIUM  4.5  4.6  4.4   CHLORIDE  101  101  102   CO2  24  25  25   GLUCOSE  85  93  83   BUN  19  20  13   CREATININE  0.65  0.64  0.44*   CALCIUM  10.3  10.4  10.2                   Imaging  CT-NEEDLE BX-MUSCLE RIGHT   Final Result   Addendum 1 of 1   Addendum:   The biopsy/drainage was done utilizing low dose optimization CT techniques    including auto modulation for  imaging and low mA CT/fluoroscopy mode    for the procedure.      Final      CT-guided aspiration of pus like material in the right gluteal muscle lesion.      CT-ABDOMEN-PELVIS WITH   Final Result      1.  Resolution of right iliacus abscess with no associated fluid adjacent to the drainage catheter       2.  Interval removal of right gluteal drainage catheter      3.  Surgical screw traversing both sacroiliac joint with associated pathologic fracture of the right ilium and right medial sacrum      4.  Hepatomegaly      CT-DRAIN-HEMATOMA - SEROMA   Final Result      1.  CT-GUIDED RETROPERITONEAL (EXTRAPERITONEAL) RIGHT PELVIC ABSCESS DRAINAGE AT THE RIGHT ILIOPSOAS. ORDERS WERE WRITTEN FOR ONCE DAILY IRRIGATION OF THE CATHETER WITH 5 ML STERILE SALINE.      2.  THE CURRENT PLAN IS TO MONITOR DRAINAGE OUTPUT AND OBTAIN A FOLLOWUP CT SCAN IN 5-7 DAYS IF CLINICALLY INDICATED.      CT-ABDOMEN-PELVIS WITH   Final Result      1.  Rim-enhancing fluid collection in the right iliopsoas muscle may have increased in size slightly from the prior exam.      2.  Pigtail catheter in the right gluteus medius muscle. No residual fluid collection is appreciated.      3.  Pathologic fracture of the right ilium. Status post right SI joint fusion.      4.  Atherosclerosis.      5.  Cholelithiasis.      CT-HEAD WITH & W/O   Final Result      1.  Interval decrease in size and level of enhancement of multiple small punctate foci in both cerebral hemispheres and in the midbrain consistent with improving septic emboli.      2.  No hemorrhage or mass effect.      CT-DRAIN-RETROPERITONEAL   Final Result      1.  CT GUIDED CATHETER DRAINAGE OF RIGHT GLUTEAL ABSCESS.   2.  THE CURRENT PLAN IS TO OBTAIN A FOLLOW-UP CT SCAN IN 5-7 DAYS IF INDICATED. ORDERS WERE WRITTEN FOR ONCE DAILY IRRIGATION OF THE CATHETER WITH 5 ML STERILE SALINE FOR 3 DAYS.   3. THE ILIOPSOAS COLLECTION WAS NOT AMENABLE TO CATHETER DRAINAGE AS INTERVENING BOWEL AND/OR BONY STRUCTURES OBSTRUCT A PATHWAY TO THIS COLLECTION AT THIS TIME.      MR-LUMBAR SPINE-WITH & W/O   Final Result      1.  There are multifocal enhancing fluid collections in the right iliopsoas muscles and gluteus muscles adjacent to the right ilium. Right iliopsoas fluid collection measures an approximately  3.9 x 2.9 cm. The right gluteal fluid collection measures an    approximately 3.5 x 2.8 cm in size. The differential diagnosis includes postsurgical seroma and abscess.   2.  Post operative and degenerative changes as described above.      MR-PELVIS-WITH & W/O AND SEQUENCES   Final Result      1.  Surgical screw traverses both sacroiliac joint across presumed pathologic fracture of the right sacrum and the hardware obscures the adjacent tissues.      2.  No other evidence for bony metastatic disease      3.  Right gluteal musculature rim-enhancing fluid collection measuring 3.4 x 2.8 cm. This could be a postoperative seroma versus abscess      4.  osteoarthritis of both hip joint      5.  Prior hysterectomy      IR-MIDLINE CATHETER INSERTION WO GUIDANCE > AGE 3   Final Result                  Ultrasound-guided midline placement performed by qualified nursing staff    as above.          CT-HEAD WITH   Final Result      Multiple subcentimeter too numerous to count enhancing masses scattered throughout the supratentorial and infratentorial brain. These demonstrate some surrounding vasogenic edema and most likely represent multifocal metastatic lesions. Other    considerations would include multifocal abscesses or septic emboli.      EC-CHAS W/O CONT   Final Result      EC-CHAS W/O CONT   Final Result      EC-ECHOCARDIOGRAM COMPLETE W/O CONT   Final Result      OUTSIDE IMAGES-CT CHEST/ABDOMEN/PELVIS   Final Result      OUTSIDE IMAGES-DX CHEST   Final Result      [id number] FILM X-RAY   Final Result      OUTSIDE IMAGES-MR BRAIN   Final Result      OUTSIDE IMAGES-MR BRAIN   Final Result           Assessment/Plan  * Lesion of brain   Assessment & Plan    Metastatic cancer vs infectious etiology  D/w Dr Black for questionable brain mets, recommending IR Bx of psoas muscle   IR Bx of psoas muscle ordered - was abscess  Empiric treatment of infection  TTE and CHAS negative for vegetations.  D/w Dr Nguyen - lesions likely  metastatic based on his interpretation of MRI/CT - too small to biopsy, recommends continuing antibiotics and re-imaging in 2 weeks to see if there has been any change.  CT brain to be repeated - lesions shrinking and decreased in overall number - c/w septic emboli       Encephalopathy   Assessment & Plan    Likely toxic metabolic from infection  Waxes and wanes       Mass of iliopsoas muscle group   Assessment & Plan    Return of fluid collection, repeat drainage in IR with cultures, drain placed 3/29 - 10ml purulent material drained and sent for culture - no growth a/o 3/30  Was on linezolid and cefepime and now on vanco/merrem - end date to be 6 weeks of treatment. (4/24/19)  ID consulting   Repeat CT abdomen shows resolution of fluid collection and ÁNGEL output dwindling - removed ÁNGEL drain         Normocytic anemia   Assessment & Plan    stable     Hyponatremia- (present on admission)   Assessment & Plan    Mildly low - doubt contribution to mentation         RLS (restless legs syndrome)- (present on admission)   Assessment & Plan    Pramipexole       Diabetes mellitus type 2 in obese (HCC)- (present on admission)   Assessment & Plan    Well controlled   Short acting insulin as needed   Accu-Checks, hypoglycemia protocol          History of breast cancer- (present on admission)   Assessment & Plan    infection to brain  No known active breast cancer.       COPD (chronic obstructive pulmonary disease) (HCC)- (present on admission)   Assessment & Plan    Oxygen as needed, Respiratory protocol, Bronchodilators, Incentive spirometry      Urinary tract infection   Assessment & Plan    Culture remain no growth.       Fungal infection of the groin   Assessment & Plan    on nystatin powder 3/24     History of deep vein thrombosis- (present on admission)   Assessment & Plan    History of deep vein thrombosis   Was on Rivaroxaban as outpatient.     Held for Bx Mar 11  Restart AC after completing abx treatment          Essential hypertension- (present on admission)   Assessment & Plan    Restarted on losartan and furosemide with hold parameters.     GERD (gastroesophageal reflux disease)- (present on admission)   Assessment & Plan    Omeprazole     Chronic pain- (present on admission)   Assessment & Plan     Multifactorial  Likely secondary to abscess, fracture  Pain control            VTE prophylaxis: scds

## 2019-04-12 NOTE — PROGRESS NOTES
A&Ox2. Disoriented to time and situation. Lethargic. VSS on 2L.  in use. No evidence of pain observed. SHOBHA midline patent and infusing. q2h turns. Fall precautions in place. Bed alarm on, call light within reach, room close to nurse station, bed low and locked, hourly rounding. Will continue to monitor throughout shift.

## 2019-04-12 NOTE — CARE PLAN
Problem: Infection  Goal: Will remain free from infection  Patient is receiving IV abx for infection. Vanco is monitored and dosed by pharmacy.     Problem: Pain Management  Goal: Pain level will decrease to patient's comfort goal  Patient with scheduled tylenol. Will medicated with PRNs as needed.

## 2019-04-13 ENCOUNTER — APPOINTMENT (OUTPATIENT)
Dept: RADIOLOGY | Facility: MEDICAL CENTER | Age: 69
DRG: 981 | End: 2019-04-13
Attending: INTERNAL MEDICINE
Payer: MEDICARE

## 2019-04-13 LAB
ANION GAP SERPL CALC-SCNC: 6 MMOL/L (ref 0–11.9)
BASOPHILS # BLD AUTO: 0.6 % (ref 0–1.8)
BASOPHILS # BLD: 0.02 K/UL (ref 0–0.12)
BUN SERPL-MCNC: 19 MG/DL (ref 8–22)
CALCIUM SERPL-MCNC: 10.4 MG/DL (ref 8.5–10.5)
CHLORIDE SERPL-SCNC: 102 MMOL/L (ref 96–112)
CO2 SERPL-SCNC: 27 MMOL/L (ref 20–33)
CREAT SERPL-MCNC: 0.61 MG/DL (ref 0.5–1.4)
EOSINOPHIL # BLD AUTO: 0.26 K/UL (ref 0–0.51)
EOSINOPHIL NFR BLD: 7.2 % (ref 0–6.9)
ERYTHROCYTE [DISTWIDTH] IN BLOOD BY AUTOMATED COUNT: 48.8 FL (ref 35.9–50)
GLUCOSE SERPL-MCNC: 103 MG/DL (ref 65–99)
HCT VFR BLD AUTO: 37.9 % (ref 37–47)
HGB BLD-MCNC: 11.9 G/DL (ref 12–16)
IMM GRANULOCYTES # BLD AUTO: 0.02 K/UL (ref 0–0.11)
IMM GRANULOCYTES NFR BLD AUTO: 0.6 % (ref 0–0.9)
LYMPHOCYTES # BLD AUTO: 0.53 K/UL (ref 1–4.8)
LYMPHOCYTES NFR BLD: 14.7 % (ref 22–41)
MCH RBC QN AUTO: 25.1 PG (ref 27–33)
MCHC RBC AUTO-ENTMCNC: 31.4 G/DL (ref 33.6–35)
MCV RBC AUTO: 79.8 FL (ref 81.4–97.8)
MONOCYTES # BLD AUTO: 0.52 K/UL (ref 0–0.85)
MONOCYTES NFR BLD AUTO: 14.4 % (ref 0–13.4)
NEUTROPHILS # BLD AUTO: 2.25 K/UL (ref 2–7.15)
NEUTROPHILS NFR BLD: 62.5 % (ref 44–72)
NRBC # BLD AUTO: 0 K/UL
NRBC BLD-RTO: 0 /100 WBC
PLATELET # BLD AUTO: 119 K/UL (ref 164–446)
PMV BLD AUTO: 10.8 FL (ref 9–12.9)
POTASSIUM SERPL-SCNC: 4.3 MMOL/L (ref 3.6–5.5)
RBC # BLD AUTO: 4.75 M/UL (ref 4.2–5.4)
SODIUM SERPL-SCNC: 135 MMOL/L (ref 135–145)
WBC # BLD AUTO: 3.6 K/UL (ref 4.8–10.8)

## 2019-04-13 PROCEDURE — 770004 HCHG ROOM/CARE - ONCOLOGY PRIVATE *

## 2019-04-13 PROCEDURE — 99232 SBSQ HOSP IP/OBS MODERATE 35: CPT | Performed by: INTERNAL MEDICINE

## 2019-04-13 PROCEDURE — 302118 SHAMPOO,NO RINSE: Performed by: INTERNAL MEDICINE

## 2019-04-13 PROCEDURE — 700117 HCHG RX CONTRAST REV CODE 255: Performed by: INTERNAL MEDICINE

## 2019-04-13 PROCEDURE — 70470 CT HEAD/BRAIN W/O & W/DYE: CPT

## 2019-04-13 PROCEDURE — 700102 HCHG RX REV CODE 250 W/ 637 OVERRIDE(OP): Performed by: INTERNAL MEDICINE

## 2019-04-13 PROCEDURE — 700105 HCHG RX REV CODE 258: Performed by: INTERNAL MEDICINE

## 2019-04-13 PROCEDURE — 99233 SBSQ HOSP IP/OBS HIGH 50: CPT | Performed by: INTERNAL MEDICINE

## 2019-04-13 PROCEDURE — 36415 COLL VENOUS BLD VENIPUNCTURE: CPT

## 2019-04-13 PROCEDURE — A9270 NON-COVERED ITEM OR SERVICE: HCPCS | Performed by: INTERNAL MEDICINE

## 2019-04-13 PROCEDURE — 700111 HCHG RX REV CODE 636 W/ 250 OVERRIDE (IP): Performed by: INTERNAL MEDICINE

## 2019-04-13 PROCEDURE — 80048 BASIC METABOLIC PNL TOTAL CA: CPT

## 2019-04-13 PROCEDURE — 85025 COMPLETE CBC W/AUTO DIFF WBC: CPT

## 2019-04-13 RX ORDER — QUETIAPINE FUMARATE 25 MG/1
25 TABLET, FILM COATED ORAL EVERY EVENING
Status: DISCONTINUED | OUTPATIENT
Start: 2019-04-13 | End: 2019-04-25

## 2019-04-13 RX ADMIN — QUETIAPINE FUMARATE 25 MG: 25 TABLET ORAL at 20:42

## 2019-04-13 RX ADMIN — IBUPROFEN 600 MG: 400 TABLET, FILM COATED ORAL at 20:42

## 2019-04-13 RX ADMIN — LOSARTAN POTASSIUM 50 MG: 50 TABLET ORAL at 04:32

## 2019-04-13 RX ADMIN — POTASSIUM CHLORIDE 40 MEQ: 1500 TABLET, EXTENDED RELEASE ORAL at 04:32

## 2019-04-13 RX ADMIN — POTASSIUM CHLORIDE 40 MEQ: 1500 TABLET, EXTENDED RELEASE ORAL at 18:07

## 2019-04-13 RX ADMIN — GABAPENTIN 300 MG: 300 CAPSULE ORAL at 18:07

## 2019-04-13 RX ADMIN — OMEPRAZOLE 20 MG: 20 CAPSULE, DELAYED RELEASE ORAL at 04:32

## 2019-04-13 RX ADMIN — FUROSEMIDE 20 MG: 20 TABLET ORAL at 04:32

## 2019-04-13 RX ADMIN — PRAMIPEXOLE DIHYDROCHLORIDE 0.25 MG: 0.25 TABLET ORAL at 20:42

## 2019-04-13 RX ADMIN — ACETAMINOPHEN 650 MG: 325 TABLET, FILM COATED ORAL at 18:07

## 2019-04-13 RX ADMIN — LABETALOL HCL 100 MG: 100 TABLET, FILM COATED ORAL at 04:32

## 2019-04-13 RX ADMIN — ACETAMINOPHEN 650 MG: 325 TABLET, FILM COATED ORAL at 00:17

## 2019-04-13 RX ADMIN — IBUPROFEN 600 MG: 400 TABLET, FILM COATED ORAL at 13:44

## 2019-04-13 RX ADMIN — IOHEXOL 100 ML: 350 INJECTION, SOLUTION INTRAVENOUS at 16:25

## 2019-04-13 RX ADMIN — VANCOMYCIN HYDROCHLORIDE 600 MG: 100 INJECTION, POWDER, LYOPHILIZED, FOR SOLUTION INTRAVENOUS at 20:43

## 2019-04-13 RX ADMIN — ACETAMINOPHEN 650 MG: 325 TABLET, FILM COATED ORAL at 23:55

## 2019-04-13 RX ADMIN — GABAPENTIN 300 MG: 300 CAPSULE ORAL at 04:33

## 2019-04-13 RX ADMIN — ACETAMINOPHEN 650 MG: 325 TABLET, FILM COATED ORAL at 04:33

## 2019-04-13 ASSESSMENT — ENCOUNTER SYMPTOMS
SORE THROAT: 0
PHOTOPHOBIA: 0
SPEECH CHANGE: 0
CONSTIPATION: 0
NAUSEA: 0
FEVER: 0
VOMITING: 0
HEARTBURN: 0
WEAKNESS: 0
DIARRHEA: 0
DEPRESSION: 0
COUGH: 0
CHILLS: 0
HEADACHES: 0
INSOMNIA: 0
SENSORY CHANGE: 0
CLAUDICATION: 0
NERVOUS/ANXIOUS: 0
BACK PAIN: 0
ABDOMINAL PAIN: 0
MYALGIAS: 1
SHORTNESS OF BREATH: 0
DIZZINESS: 0
BLURRED VISION: 0

## 2019-04-13 NOTE — CARE PLAN
Problem: Infection  Goal: Will remain free from infection  Patient on IV vancomycin for infection    Problem: Psychosocial Needs:  Goal: Level of anxiety will decrease  Patient's mentation has improved and patient is A&Ox4 at this time.

## 2019-04-13 NOTE — PROGRESS NOTES
"[facility] Medicine Daily Progress Note    Date of Service  4/13/2019    Chief Complaint  68 y.o. female admitted 3/8/2019 with right hip pain, fever and abnormal brain mri.    Hospital Course    Patient started on empiric antibiotics given fever.  She had abnormal findings on MRI brain and CT showed \"lesion\" on right iliopsoas.  This lesion was biopsied and turned out to be abscess.  She has history of breast cancer and there is also concern of brain lesions being metastatic though their appearance is not typical of this.      Interval Problem Update  3/12 Discussed with Dr Turner for second opinion of brain lesions and based on appearance not able to r/o or r/i any diagnosis.  Given patient's presentation of confusion, hip pain and fever  - this is more supportive of infectious etiology rather than malignant.  Fluid from right gluteal area sent to micro and as of yet - no growth.  Continue zosyn for now.  3/13 Patient with slight improvement in mentation.  Blood cultures and abscess cultures are showing no growth at this time.  TTE being done while I examined patient - no evidence of vegetations on this test.  ID consultation pending - d/w Dr Garcia.  3/14 Patient feeling well today, her pain is present but not as bad as prior to admission.  She was able to follow the conversation today and is agreeable to move forward with the CHAS tomorrow.  I spoke with her brother, René, and updated him on condition yesterday afternoon also.  Will also have PICC placed in next few days as long as blood cultures remain negative as I expect a prolonged antibiotic course of treatment.  3/15 Patient without new complaints, states she needs help to move in bed.  CHAS showed no evidence of vegetations and regular diet resumed.  Culture remains negative and biopsy of brain lesion may prove needed - will watch through the weekend and if mentation does not continue to improve, will discuss with neurosurgery on Monday.  3/16 Patient states " she is okay today, mentation has waxed and waned without significant improvement.  She was febrile earlier today.  No other acute events.  3/17 Patient with significant improvement in mentation today, she is aware of her hospitalization, not falling asleep mid sentence.  Her pain mediations were held most of yesterday and likely far too strong for patient and were affecting her awareness.  Oxycodone 5 discontinued and will use tramadol instead unless pain not well controlled.  CT brain with contrast ordered as a quick scan for comparison to MRI.    3/18 Patient feeling same today, discussed need to discuss with neurosurgery for possible biopsy.  Discussed with Dr Nguyen, he reviewed MRI and CT brain and he feels they are likely metastatic lesions but are far too small to biopsy.  His recommendation is to continue with antibiotics and re-image in 2 weeks to see if any change that would be amenable to biopsy or that would further declare infection.  3/26 Patient mental status continues to wax and wane.  ID ordered MRI lumbar, pelvis and sacrum for evaluation for source of infection, patient with continued low back pain and no real improvement since I saw her 7 days prior with continued antibiotics during this time.  Cultures have not growth pathogen, repeat MRI 3/28.   3/27 Patient somnolent most of the day today, MRI's completed and showing 2 areas of fluid collections, given her history are likely abscesses and will require drainage, CT guided drainage requested and patient NPO at MN for this.  D/w ID - cultures will be done on fluid collection.  Repeat CT brain ordered for tomorrow.  3/28 Patient up to chair, diet resumed as lovenox given this am and drainage of abscesses deferred until tomorrow.  Vanco/merrem to continue and cultures will be collected from abscesses.  3/29 Patient went to IR today, had drain placed in the right buttock into abscess cavity and fluid sent for culture.  Potassium is slightly low,  continue with PO replacement.  HR has improved from yesterday but patient PO intake still not at normal level, increase IVF rate to 100cc/hour.  3/30 Patient without fever since drain placed right buttock.  Purulent material in ÁNGEL bulb. Cultures thus far are still showing no growth.  Antibiotics to continue.  CT head ordered to evaluate change in past 2 weeks as recommended by neurosurgery.  3/31 Patient with fevers overnight - was altered during this time but has recovered.  She denies pain when examined by me.  She still has purulent material in the drainage tubing.  Will continue with current IV antibiotics - 6 weeks total antibiotics suspected - end date would be 4/24/19.  D/w ID.  4/1 Patient remains intermittently somnolent.  She wakes easily but falls back to sleep quickly.  She remained afebrile overnight.  ÁNGEL drain continues to drain purulent material.  ABX through 4/24 - once accepting facility found, patient okay to transfer with midline intact for completion of antibiotics.    4/9 Patient somnolent when examined.  CT showed resolution of fluid collection where ÁNGEL in place.  RN to remove ÁNGEL today.  All cultures still remain without growth.  4/10 Patient seen when working with OT, she is awake but gets off task quickly and is easily fatigued.  ÁNGEL drain removed yesterday.  Patient states she has pain in the lumbar spine when she coughs - just above her surgical area.  She has been in bed for nearly 1 month and this is likely not helping her back pain - educated need for OOB as often as tolerated.  4/11 Patient had bowel incontinence prior to my entering room, states she thinks she needs a bed pan.  CNA notified.  She continues to have low back pain in the area of her surgery and given her bed-bound status during her infection and intermittent confusion, this is not unexpected.  Continuing with therapy recommended and patient is 1:1 feeding given her weakness to feed herself.  4/12 Patient tearful this am,  she is concerned that she will not walk again, support given and educated that she is weak and the inability to walk is only temporary.  She needs to work with therapy and get stronger and I need to stop her narcotics and only give tyelnol/ibuprofen for pain management along with non-narcotic alternatives.  I spoke to patient's brother, René, at her request.  4/13 Patient up to chair when evaluated, mentation improved with discontinuation of narcotics. Brother is coming to visit this weekend and she is looking forward to this.  Ortho recommendations still pending - per ID she spoke to Dr Porter who is consulting Dr Sandra for recommendations.    Consultants/Specialty  ID - Sarah/Ashish  Ortho - pending.    Code Status  full    Disposition  SNF/[facility] in [place]    Review of Systems  Review of Systems   Constitutional: Negative for chills and fever.   HENT: Negative for congestion and sore throat.    Eyes: Negative for blurred vision and photophobia.   Respiratory: Negative for cough and shortness of breath.    Cardiovascular: Negative for chest pain, claudication and leg swelling.   Gastrointestinal: Negative for abdominal pain, constipation, diarrhea, heartburn, nausea and vomiting.   Genitourinary: Negative for dysuria and hematuria.   Musculoskeletal: Positive for myalgias. Negative for back pain (lumbar/sacral area) and joint pain.   Skin: Negative for itching and rash.   Neurological: Negative for dizziness, sensory change, speech change, weakness and headaches.   Psychiatric/Behavioral: Negative for depression. The patient is not nervous/anxious and does not have insomnia.         Physical Exam  Temp:  [36.1 °C (97 °F)-36.8 °C (98.3 °F)] 36.2 °C (97.1 °F)  Pulse:  [59-98] 59  Resp:  [16-18] 16  BP: (120-174)/(61-88) 127/61  SpO2:  [92 %-99 %] 98 %    Physical Exam   Constitutional: She is oriented to person, place, and time. She appears well-developed and well-nourished. No distress.   HENT:   Head:  Normocephalic and atraumatic.   Eyes: Conjunctivae are normal. No scleral icterus.   Neck: Neck supple. No JVD present.   Cardiovascular: Normal rate, regular rhythm and normal heart sounds.  Exam reveals no gallop and no friction rub.    No murmur heard.  Pulmonary/Chest: Effort normal and breath sounds normal. No respiratory distress. She exhibits no tenderness.   Abdominal: Soft. Bowel sounds are normal. She exhibits no distension. There is no guarding.   Musculoskeletal: She exhibits no edema or tenderness.   ÁNGEL drain removed       Lymphadenopathy:     She has no cervical adenopathy.   Neurological: She is alert and oriented to person, place, and time. No cranial nerve deficit.   Mentation labile     Skin: Skin is warm and dry. She is not diaphoretic. No erythema. No pallor.   Psychiatric: She has a normal mood and affect. Her behavior is normal.   Nursing note and vitals reviewed.      Fluids  No intake or output data in the 24 hours ending 04/13/19 1338    Laboratory  Recent Labs      04/11/19   0025  04/12/19   0028  04/13/19   0029   WBC  4.2*  3.8*  3.6*   RBC  4.92  4.47  4.75   HEMOGLOBIN  12.4  11.1*  11.9*   HEMATOCRIT  39.2  35.8*  37.9   MCV  79.7*  80.1*  79.8*   MCH  25.2*  24.8*  25.1*   MCHC  31.6*  31.0*  31.4*   RDW  50.5*  50.2*  48.8   PLATELETCT  108*  104*  119*   MPV  10.4  10.0  10.8     Recent Labs      04/11/19   0025  04/12/19   0028  04/13/19   0029   SODIUM  132*  132*  135   POTASSIUM  4.6  4.4  4.3   CHLORIDE  101  102  102   CO2  25  25  27   GLUCOSE  93  83  103*   BUN  20  13  19   CREATININE  0.64  0.44*  0.61   CALCIUM  10.4  10.2  10.4                   Imaging  CT-NEEDLE BX-MUSCLE RIGHT   Final Result   Addendum 1 of 1   Addendum:   The biopsy/drainage was done utilizing low dose optimization CT techniques    including auto modulation for  imaging and low mA CT/fluoroscopy mode    for the procedure.      Final      CT-guided aspiration of pus like material in the right  gluteal muscle lesion.      CT-ABDOMEN-PELVIS WITH   Final Result      1.  Resolution of right iliacus abscess with no associated fluid adjacent to the drainage catheter      2.  Interval removal of right gluteal drainage catheter      3.  Surgical screw traversing both sacroiliac joint with associated pathologic fracture of the right ilium and right medial sacrum      4.  Hepatomegaly      CT-DRAIN-HEMATOMA - SEROMA   Final Result      1.  CT-GUIDED RETROPERITONEAL (EXTRAPERITONEAL) RIGHT PELVIC ABSCESS DRAINAGE AT THE RIGHT ILIOPSOAS. ORDERS WERE WRITTEN FOR ONCE DAILY IRRIGATION OF THE CATHETER WITH 5 ML STERILE SALINE.      2.  THE CURRENT PLAN IS TO MONITOR DRAINAGE OUTPUT AND OBTAIN A FOLLOWUP CT SCAN IN 5-7 DAYS IF CLINICALLY INDICATED.      CT-ABDOMEN-PELVIS WITH   Final Result      1.  Rim-enhancing fluid collection in the right iliopsoas muscle may have increased in size slightly from the prior exam.      2.  Pigtail catheter in the right gluteus medius muscle. No residual fluid collection is appreciated.      3.  Pathologic fracture of the right ilium. Status post right SI joint fusion.      4.  Atherosclerosis.      5.  Cholelithiasis.      CT-HEAD WITH & W/O   Final Result      1.  Interval decrease in size and level of enhancement of multiple small punctate foci in both cerebral hemispheres and in the midbrain consistent with improving septic emboli.      2.  No hemorrhage or mass effect.      CT-DRAIN-RETROPERITONEAL   Final Result      1.  CT GUIDED CATHETER DRAINAGE OF RIGHT GLUTEAL ABSCESS.   2.  THE CURRENT PLAN IS TO OBTAIN A FOLLOW-UP CT SCAN IN 5-7 DAYS IF INDICATED. ORDERS WERE WRITTEN FOR ONCE DAILY IRRIGATION OF THE CATHETER WITH 5 ML STERILE SALINE FOR 3 DAYS.   3. THE ILIOPSOAS COLLECTION WAS NOT AMENABLE TO CATHETER DRAINAGE AS INTERVENING BOWEL AND/OR BONY STRUCTURES OBSTRUCT A PATHWAY TO THIS COLLECTION AT THIS TIME.      MR-LUMBAR SPINE-WITH & W/O   Final Result      1.  There are  multifocal enhancing fluid collections in the right iliopsoas muscles and gluteus muscles adjacent to the right ilium. Right iliopsoas fluid collection measures an approximately 3.9 x 2.9 cm. The right gluteal fluid collection measures an    approximately 3.5 x 2.8 cm in size. The differential diagnosis includes postsurgical seroma and abscess.   2.  Post operative and degenerative changes as described above.      MR-PELVIS-WITH & W/O AND SEQUENCES   Final Result      1.  Surgical screw traverses both sacroiliac joint across presumed pathologic fracture of the right sacrum and the hardware obscures the adjacent tissues.      2.  No other evidence for bony metastatic disease      3.  Right gluteal musculature rim-enhancing fluid collection measuring 3.4 x 2.8 cm. This could be a postoperative seroma versus abscess      4.  osteoarthritis of both hip joint      5.  Prior hysterectomy      IR-MIDLINE CATHETER INSERTION WO GUIDANCE > AGE 3   Final Result                  Ultrasound-guided midline placement performed by qualified nursing staff    as above.          CT-HEAD WITH   Final Result      Multiple subcentimeter too numerous to count enhancing masses scattered throughout the supratentorial and infratentorial brain. These demonstrate some surrounding vasogenic edema and most likely represent multifocal metastatic lesions. Other    considerations would include multifocal abscesses or septic emboli.      EC-CHAS W/O CONT   Final Result      EC-CHAS W/O CONT   Final Result      EC-ECHOCARDIOGRAM COMPLETE W/O CONT   Final Result      OUTSIDE IMAGES-CT CHEST/ABDOMEN/PELVIS   Final Result      OUTSIDE IMAGES-DX CHEST   Final Result      [id number] FILM X-RAY   Final Result      OUTSIDE IMAGES-MR BRAIN   Final Result      OUTSIDE IMAGES-MR BRAIN   Final Result      CT-HEAD WITH    (Results Pending)        Assessment/Plan  * Lesion of brain   Assessment & Plan    Metastatic cancer vs infectious etiology  D/w   [place] for questionable brain mets, recommending IR Bx of psoas muscle   IR Bx of psoas muscle ordered - was abscess  Empiric treatment of infection  TTE and CHAS negative for vegetations.  D/w Dr Nguyen - lesions likely metastatic based on his interpretation of MRI/CT - too small to biopsy, recommends continuing antibiotics and re-imaging in 2 weeks to see if there has been any change.  CT brain to be repeated - lesions shrinking and decreased in overall number - c/w septic emboli       Encephalopathy   Assessment & Plan    Waxes and wanes  Improved with dc of narcotics and using scheduled tyelnol for pain.         Mass of iliopsoas muscle group   Assessment & Plan    Return of fluid collection, repeat drainage in IR with cultures, drain placed 3/29 - 10ml purulent material drained and sent for culture - no growth a/o 3/30  Was on linezolid and cefepime and now on vanco/merrem - end date to be 6 weeks of treatment. (4/24/19)  ID consulting   Repeat CT abdomen shows resolution of fluid collection and ÁNGEL output dwindling - removed ÁNGEL drain         Normocytic anemia   Assessment & Plan    stable     Hyponatremia- (present on admission)   Assessment & Plan    Mildly low - doubt contribution to mentation         RLS (restless legs syndrome)- (present on admission)   Assessment & Plan    Pramipexole       Diabetes mellitus type 2 in obese (HCC)- (present on admission)   Assessment & Plan    Well controlled   Short acting insulin as needed   Accu-Checks, hypoglycemia protocol          History of breast cancer- (present on admission)   Assessment & Plan    infection to brain  No known active breast cancer.       COPD (chronic obstructive pulmonary disease) (HCC)- (present on admission)   Assessment & Plan    Oxygen as needed, Respiratory protocol, Bronchodilators, Incentive spirometry      Urinary tract infection   Assessment & Plan    Culture remain no growth.       Fungal infection of the groin   Assessment & Plan    on  nystatin powder 3/24     History of deep vein thrombosis- (present on admission)   Assessment & Plan    History of deep vein thrombosis   Was on Rivaroxaban as outpatient.     Held for Bx Mar 11  Restart AC after completing abx treatment         Essential hypertension- (present on admission)   Assessment & Plan    Restarted on losartan and furosemide with hold parameters.     GERD (gastroesophageal reflux disease)- (present on admission)   Assessment & Plan    Omeprazole     Chronic pain- (present on admission)   Assessment & Plan     Multifactorial  Likely secondary to abscess, fracture, bed bound status  Pain control            VTE prophylaxis: scds

## 2019-04-13 NOTE — PROGRESS NOTES
Pharmacy Kinetics 68 y.o. female on vancomycin day # 20 2019    Currently on Vancomycin 600 mg iv q24hr    Indication for Treatment: possible brain abscesses     Pertinent history per medical record: Admitted on 3/8/2019 for iliopsoas mass and multiple brain lesions.  There is concern that this is infectious.  All cultures are negative to date, but were all drawn while on antibiotics. CHAS was negative.  Patient has a history of breast cancer and DM.  ID is following.  Plan for completion of abx (6 weeks) in house prior to transfer to [facility] in [place].       Other antibiotics: meropenem 2 gm IV q8h     Allergies: Patient has no known allergies.      List concerns for renal function: age, and prolonged vancomycin duration, elevated BUN/SCr ratio     Pertinent cultures to date:   3/11/19 wound - right gluteal mass: negative  3/12/19 blood - peripheral x 2: negative  3/29/19 wound - right buttock: negative  3/31/19 blood - peripheral x 2: negative   19 wound - retroperitoneal drain aspirate: negative    MRSA nares swab if pneumonia is a concern (ordered/positive/negative/n-a): n/a    Recent Labs      19   0025  19   0028  19   0029   WBC  4.2*  3.8*  3.6*   NEUTSPOLYS  71.20  60.90  62.50     Recent Labs      19   0025  19   0028  19   0029   BUN    19   CREATININE  0.64  0.44*  0.61     Recent Labs      19   1929   VANCOTROUGH  17.2   No intake or output data in the 24 hours ending 19 0814   BP (!) 174/88   Pulse 81   Temp 36.1 °C (97 °F) (Temporal)   Resp 16   Ht 1.524 m (5')   Wt 60.2 kg (132 lb 11.5 oz)   SpO2 99%  Temp (24hrs), Av.3 °C (97.3 °F), Min:36.1 °C (97 °F), Max:36.8 °C (98.3 °F)      A/P   1. Vancomycin dose change: Continue vancomycin 600 mg IV q24h ()  2. Next vancomycin level: ~ 4 days (not ordered)  3. Goal trough: 16-20 mcg/mL  4. Comments: Renal indices stable, I/O's not reported. Pt is afebrile. ID spoke with orthopedics  about taking hardware out as this is like the source of infection. Pharmacy will continue to monitor and obtain repeat trough in ~ 4 days, sooner if clinically indicated.     Joanna Lezama, PharmD, BCOP

## 2019-04-13 NOTE — PROGRESS NOTES
A&Ox4. VSS on 2L.  in use. SOA with exertion. Pain to back; prn ibuprofen and scheduled tylenol. Denies nausea. SHOBHA midline infusing IV abx. Incontinent of B/B. Fall precautions in place. Bed alarm on, call light within reach, bed low and locked, hourly rounding. No new needs at this time. Will continue to monitor throughout shift.

## 2019-04-13 NOTE — PROGRESS NOTES
Patients brother wanting to take [facility] to the [facility]. Patient assisted to wheelchair. Patient escorted off the unit by brother and family.

## 2019-04-13 NOTE — CARE PLAN
Problem: Respiratory:  Goal: Respiratory status will improve  Outcome: PROGRESSING AS EXPECTED  Oxygen high 90s on 2L.  in use    Problem: Pain Management  Goal: Pain level will decrease to patient's comfort goal  Outcome: PROGRESSING AS EXPECTED  Pain to back. Prn ibuprofen and scheduled tylenol. Repositioned.

## 2019-04-13 NOTE — PROGRESS NOTES
Rn received report, assumed patient care. Patient is sitting up in bed. Denies pain at this time. Patient is A&Ox4. Call light within reach, all needs met at this time.

## 2019-04-13 NOTE — PROGRESS NOTES
Infectious Disease Progress Note    Author: Beatriz Johnson M.D. Date & Time of service: 4/13/2019  7:09 AM    Chief Complaint:  Multiple brain lesions  Iliopsoas lesion    Interval History:  68-year-old female with history of diabetes and breast cancer, admitted 3/8/2019 with abdominal pain, iliopsoas lesion, and an abnormal MRI with multiple brain lesions.  4/1/2019 no fevers.  Easily arousable.  ÁNGEL continues to drain.  4/2/2019-no fevers.  Awake and responsive but feels weak.  There is minimal drainage from the ÁNGEL.  As per the nursing the patient is incontinent of stool.  But when you talk to her she states she is just is not able to get up on time.  4/3/2019-complains of some weakness in the hand. No fevers. The drain is not draining much  4/4- no fevers. She is anxious since she has been NPO. Awaiting IR drain  4/5 no fevers.  Got her drain.  There was purulent fluid.  WBCs 4.6.  4/8 afebrile, white count 3.3.  IR drain remains in place.  Continues to have significant word finding difficulty.  4/9 per RN, patient more awake and interactive but remains confused with word finding difficulty.  Resting comfortably this morning  4/10 afebrile, white count 4.6.  Patient much more awake and interactive today.  Reports no new issues, reports no pain anywhere.  IR drain has been removed.  4/11 afebrile WBC 4.2 patient complains of pain in her legs and feet.  She is being assisted with feeding.  Denies any headaches or neck pain  4/12/2019 no fevers.  Patient is alert and oriented.  Although really slow.  Apparently when she gets pain medications she becomes confused.  But this morning she is answering all questions appropriately.  4/13/2019 patient is somewhat confused.  No fevers.  No new issues overnight.  Labs Reviewed    Review of Systems:  Review of Systems   Constitutional: Positive for malaise/fatigue. Negative for fever.   HENT: Negative for hearing loss.    Respiratory: Negative for cough and shortness of  breath.    Cardiovascular: Negative for chest pain and leg swelling.   Gastrointestinal: Negative for nausea and vomiting.   Genitourinary: Negative for dysuria.   Musculoskeletal: Positive for myalgias.        Complains of some hip pain   Neurological: Negative for dizziness, sensory change, speech change and headaches.   Limited review of systems secondary to intermittent confusion and word finding difficulty    Hemodynamics:  Temp (24hrs), Av.3 °C (97.3 °F), Min:36.1 °C (97 °F), Max:36.8 °C (98.3 °F)  Temperature: 36.1 °C (97 °F)  Pulse  Av.8  Min: 70  Max: 153  Blood Pressure : (!) 174/88       Physical Exam:  Physical Exam   Constitutional: She is oriented to person, place, and time. She appears well-developed and well-nourished. No distress.   HENT:   Head: Normocephalic and atraumatic.   Eyes: Pupils are equal, round, and reactive to light. EOM are normal. Right eye exhibits no discharge. Left eye exhibits no discharge.   Neck: Neck supple.   Cardiovascular: Normal rate and intact distal pulses.    Murmur heard.  Pulmonary/Chest: Effort normal and breath sounds normal. No respiratory distress. She has no wheezes.   Abdominal: Soft. Bowel sounds are normal. She exhibits no distension. There is no tenderness.   Musculoskeletal: She exhibits no edema, tenderness or deformity.   Right upper extremity PICC line   Lymphadenopathy:     She has no cervical adenopathy.   Neurological: She is alert and oriented to person, place, and time.   Moves all extremities   Skin: Skin is warm. No rash noted. She is not diaphoretic.   Nursing note and vitals reviewed.    Meds:    Current Facility-Administered Medications:   •  acetaminophen  •  furosemide  •  haloperidol lactate  •  gabapentin  •  ibuprofen  •  labetalol  •  loperamide  •  losartan  •  MD Alert...Vancomycin per Pharmacy  •  Notify provider if pain remains uncontrolled **AND** Use the numeric rating scale (NRS-11) on regular floors and Critical-Care Pain  Observation Tool (CPOT) on ICUs/Trauma to assess pain **AND** Pulse Ox (Oximetry) **AND** Pharmacy Consult Request **AND** If patient difficult to arouse and/or has respiratory depression, stop any opiates that are currently infusing and call a Rapid Response. **AND** [DISCONTINUED] oxyCODONE immediate-release **AND** [DISCONTINUED] oxyCODONE immediate-release **AND** [DISCONTINUED] morphine injection  •  omeprazole  •  ondansetron  •  ondansetron  •  potassium chloride SA  •  pramipexole  •  Respiratory Care per Protocol  •  QUEtiapine  •  vancomycin    Labs:  Recent Labs      04/11/19 0025 04/12/19 0028 04/13/19 0029   WBC  4.2*  3.8*  3.6*   RBC  4.92  4.47  4.75   HEMOGLOBIN  12.4  11.1*  11.9*   HEMATOCRIT  39.2  35.8*  37.9   MCV  79.7*  80.1*  79.8*   MCH  25.2*  24.8*  25.1*   RDW  50.5*  50.2*  48.8   PLATELETCT  108*  104*  119*   MPV  10.4  10.0  10.8   NEUTSPOLYS  71.20  60.90  62.50   LYMPHOCYTES  10.80*  16.60*  14.70*   MONOCYTES  11.70  16.10*  14.40*   EOSINOPHILS  5.40  5.30  7.20*   BASOPHILS  0.90  0.80  0.60   RBCMORPHOLO  Present   --    --      Recent Labs      04/11/19 0025 04/12/19 0028 04/13/19 0029   SODIUM  132*  132*  135   POTASSIUM  4.6  4.4  4.3   CHLORIDE  101  102  102   CO2  25  25  27   GLUCOSE  93  83  103*   BUN  20  13  19     Recent Labs      04/11/19 0025 04/12/19 0028  04/13/19   0029   CREATININE  0.64  0.44*  0.61       Imaging:  Ct-needle Bx-muscle Right    Result Date: 3/13/2019  3/11/2019 4:44 PM HISTORY/REASON FOR EXAM:  Right gluteal hypodense lesion. Moderate sedation was administered with continuous monitoring of the patient under the direct supervision of  Medications: 2 mg of Versed and 200 mcg of fentanyl Total sedation time 60 minutes Procedure: After the informed consent the patient was placed on the CT table on prone position. Localization CT scan was obtained. It demonstrates hypodense area in the right gluteus muscle.  Subsequently a 17-gauge needle was advanced into the lesion. 20  mL of pus was aspirated. The materials were sent to the pathology. The patient tolerated the procedure without any immediate competition.     CT-guided aspiration of pus like material in the right gluteal muscle lesion.    Ec-echocardiogram Complete W/o Cont    Result Date: 3/13/2019  Transthoracic Echo Report Echocardiography Laboratory CONCLUSIONS No prior study is available for comparison. Normal left ventricular systolic function. Left ventricular ejection fraction is visually estimated to be 65%. Normal diastolic function. Normal inferior vena cava size and inspiratory collapse. Aortic sclerosis without stenosis. Estimated right ventricular systolic pressure  is 33 mmHg. No obvious valvular vegetations are noted on a decent quality study.  If further valvular assessment is clinically indicated, consider transesophageal echocardiogram. SAM,  LV EF:  65    % FINDINGS Left Ventricle Normal left ventricular size and systolic function. Normal left ventricular wall thickness. Left ventricular ejection fraction is visually estimated to be 65%. Normal diastolic function. Right Ventricle Normal right ventricular size and systolic function. Right Atrium Normal right atrial size. Normal inferior vena cava size and inspiratory collapse. Left Atrium Normal left atrial size. Mitral Valve Structurally normal mitral valve without significant stenosis or regurgitation. Aortic Valve Aortic sclerosis without stenosis. No aortic insufficiency. Tricuspid Valve Structurally normal tricuspid valve. Mild tricuspid regurgitation. Right atrial pressure is estimated to be 3 mmHg. Estimated right ventricular systolic pressure  is 33 mmHg. Pulmonic Valve The pulmonic valve is not well visualized. No pulmonic insufficiency. Pericardium Normal pericardium without effusion. Aorta The aortic root is normal.  Ascending aorta diameter is 3.0 cm. Claire Tripathi MD (Electronically  Signed) Final Date:     13 March 2019                 14:38      Micro:  Results     Procedure Component Value Units Date/Time    URINALYSIS [[id number]]  (Abnormal) Collected:  04/12/19 1148    Order Status:  Completed Specimen:  Urine from Urine, Cath Updated:  04/12/19 1246     Color Yellow     Character Clear     Specific Gravity 1.011     Ph 7.0     Glucose Negative mg/dL      Ketones Negative mg/dL      Protein 30 (A) mg/dL      Bilirubin Negative     Urobilinogen, Urine 0.2     Nitrite Negative     Leukocyte Esterase Trace (A)     Occult Blood Negative     Micro Urine Req Microscopic    Narrative:       Collected By:[id number] CuralateNIFER L    CULTURE WOUND W/ GRAM STAIN [[id number]] Collected:  04/04/19 1931    Order Status:  Completed Specimen:  Wound Updated:  04/07/19 0731     Significant Indicator NEG     Source WND     Site Retroperitoneal Drain Aspirate     Culture Result Wound No growth at 72 hours     Gram Stain Result Many WBCs.  No organisms seen.            Assessment:  Active Hospital Problems    Diagnosis   • *Lesion of brain [G93.9]   • Mass of iliopsoas muscle group [M62.89]   • Encephalopathy [G93.40]   •    • History of breast cancer [Z85.3]   • Diabetes mellitus type 2 in obese (HCC) [E11.69, E66.9]       Plan:  Multiple brain lesions-likely abscesses versus metastatic lesions  Leukopenia  Confusion improving  MRI with scattered small lesions incl aaron, too small to biopsy per neurosurgery  TTE neg; CHAS neg  Bcxs neg to date  Repeat CT scan on 3/30/2019 shows decrease in the size of the brain lesions, supporting that these are abscesses  Repeat MRI brain prior to completion of abx    Iliopsoas abscess, on treatment  CT + 2.6 cm lesion in the right iliopsoas region  S/p aspiration +WBCs-cultures negative to date  Bcxs remain neg  Last vanc trough of 20.4 and 4/5  D/c cefepime 03/26 - may be contributing to confusion  Tolerating Meropenem started 03/26  MRI w/ contrast lumbar, pelvis 03/27 -  Iliopsoas and gluteal fluid collections noted.    Drain placed 3/29/2019  Cultures are negative so far  Repeat CT scan shows resolution of the abscess in the gluteus but increase in the iliopsoas  Another drain was placed on 4/4/2019 and it shows purulent fluid  Repeat CT from 4/8 with resolution of the fluid collections after drainage per my read  Aim  for at least 6 weeks of IV antibiotics (end date: 4/24/2018) likely followed by p.o. suppression given that the infection is adjacent to hardware  Consider repeat CHAS at some point given her multiple brain lesions suspicious for infective endocarditis  Continue broad-spectrum IV vancomycin and meropenem for now.  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  I spoke to orthopedics about logistics of taking the hardware out.    H/o breast cancer  Markedly elevated alk phos, trending down  Continued diaz for mets    Type 2 diabetes mellitus  Hemoglobin A1c 6.3  Keep BS under 150 to help control current infection    Altered mental status  Appears to be waxing and waning  Multifactorial, likely large contribution from her multiple brain abscesses, monitor    I have performed a physical exam and reviewed and updated ROS and plan today 4/13/2019.  In review of yesterday's note 4/12/2019, there are no changes except as documented above.    Continue current plan as outlined above    Discussed with IM Dr. Keller

## 2019-04-14 LAB
ANION GAP SERPL CALC-SCNC: 5 MMOL/L (ref 0–11.9)
BASOPHILS # BLD AUTO: 1 % (ref 0–1.8)
BASOPHILS # BLD: 0.04 K/UL (ref 0–0.12)
BUN SERPL-MCNC: 22 MG/DL (ref 8–22)
CALCIUM SERPL-MCNC: 10.3 MG/DL (ref 8.5–10.5)
CHLORIDE SERPL-SCNC: 105 MMOL/L (ref 96–112)
CO2 SERPL-SCNC: 25 MMOL/L (ref 20–33)
CREAT SERPL-MCNC: 0.59 MG/DL (ref 0.5–1.4)
EOSINOPHIL # BLD AUTO: 0.33 K/UL (ref 0–0.51)
EOSINOPHIL NFR BLD: 8.2 % (ref 0–6.9)
ERYTHROCYTE [DISTWIDTH] IN BLOOD BY AUTOMATED COUNT: 50.3 FL (ref 35.9–50)
GLUCOSE SERPL-MCNC: 102 MG/DL (ref 65–99)
HCT VFR BLD AUTO: 38.1 % (ref 37–47)
HGB BLD-MCNC: 11.4 G/DL (ref 12–16)
IMM GRANULOCYTES # BLD AUTO: 0.01 K/UL (ref 0–0.11)
IMM GRANULOCYTES NFR BLD AUTO: 0.2 % (ref 0–0.9)
LYMPHOCYTES # BLD AUTO: 0.95 K/UL (ref 1–4.8)
LYMPHOCYTES NFR BLD: 23.7 % (ref 22–41)
MCH RBC QN AUTO: 24.3 PG (ref 27–33)
MCHC RBC AUTO-ENTMCNC: 29.9 G/DL (ref 33.6–35)
MCV RBC AUTO: 81.1 FL (ref 81.4–97.8)
MONOCYTES # BLD AUTO: 0.57 K/UL (ref 0–0.85)
MONOCYTES NFR BLD AUTO: 14.2 % (ref 0–13.4)
NEUTROPHILS # BLD AUTO: 2.11 K/UL (ref 2–7.15)
NEUTROPHILS NFR BLD: 52.7 % (ref 44–72)
NRBC # BLD AUTO: 0 K/UL
NRBC BLD-RTO: 0 /100 WBC
PLATELET # BLD AUTO: 126 K/UL (ref 164–446)
PMV BLD AUTO: 10.4 FL (ref 9–12.9)
POTASSIUM SERPL-SCNC: 5.3 MMOL/L (ref 3.6–5.5)
RBC # BLD AUTO: 4.7 M/UL (ref 4.2–5.4)
SODIUM SERPL-SCNC: 135 MMOL/L (ref 135–145)
WBC # BLD AUTO: 4 K/UL (ref 4.8–10.8)

## 2019-04-14 PROCEDURE — 700111 HCHG RX REV CODE 636 W/ 250 OVERRIDE (IP): Performed by: INTERNAL MEDICINE

## 2019-04-14 PROCEDURE — 770004 HCHG ROOM/CARE - ONCOLOGY PRIVATE *

## 2019-04-14 PROCEDURE — 99232 SBSQ HOSP IP/OBS MODERATE 35: CPT | Performed by: INTERNAL MEDICINE

## 2019-04-14 PROCEDURE — A9270 NON-COVERED ITEM OR SERVICE: HCPCS | Performed by: INTERNAL MEDICINE

## 2019-04-14 PROCEDURE — 36415 COLL VENOUS BLD VENIPUNCTURE: CPT

## 2019-04-14 PROCEDURE — 80048 BASIC METABOLIC PNL TOTAL CA: CPT

## 2019-04-14 PROCEDURE — 99233 SBSQ HOSP IP/OBS HIGH 50: CPT | Performed by: INTERNAL MEDICINE

## 2019-04-14 PROCEDURE — 700105 HCHG RX REV CODE 258: Performed by: INTERNAL MEDICINE

## 2019-04-14 PROCEDURE — 700102 HCHG RX REV CODE 250 W/ 637 OVERRIDE(OP): Performed by: INTERNAL MEDICINE

## 2019-04-14 PROCEDURE — 85025 COMPLETE CBC W/AUTO DIFF WBC: CPT

## 2019-04-14 RX ADMIN — POTASSIUM CHLORIDE 40 MEQ: 1500 TABLET, EXTENDED RELEASE ORAL at 16:51

## 2019-04-14 RX ADMIN — GABAPENTIN 300 MG: 300 CAPSULE ORAL at 16:51

## 2019-04-14 RX ADMIN — VANCOMYCIN HYDROCHLORIDE 600 MG: 100 INJECTION, POWDER, LYOPHILIZED, FOR SOLUTION INTRAVENOUS at 20:43

## 2019-04-14 RX ADMIN — ACETAMINOPHEN 650 MG: 325 TABLET, FILM COATED ORAL at 05:38

## 2019-04-14 RX ADMIN — FUROSEMIDE 20 MG: 20 TABLET ORAL at 05:37

## 2019-04-14 RX ADMIN — LOSARTAN POTASSIUM 50 MG: 50 TABLET ORAL at 05:37

## 2019-04-14 RX ADMIN — ACETAMINOPHEN 650 MG: 325 TABLET, FILM COATED ORAL at 12:35

## 2019-04-14 RX ADMIN — PRAMIPEXOLE DIHYDROCHLORIDE 0.25 MG: 0.25 TABLET ORAL at 15:43

## 2019-04-14 RX ADMIN — GABAPENTIN 300 MG: 300 CAPSULE ORAL at 05:37

## 2019-04-14 RX ADMIN — QUETIAPINE FUMARATE 25 MG: 25 TABLET ORAL at 16:51

## 2019-04-14 RX ADMIN — HALOPERIDOL LACTATE 5 MG: 5 INJECTION, SOLUTION INTRAMUSCULAR at 17:05

## 2019-04-14 RX ADMIN — ACETAMINOPHEN 650 MG: 325 TABLET, FILM COATED ORAL at 23:48

## 2019-04-14 RX ADMIN — ACETAMINOPHEN 650 MG: 325 TABLET, FILM COATED ORAL at 16:51

## 2019-04-14 RX ADMIN — POTASSIUM CHLORIDE 40 MEQ: 1500 TABLET, EXTENDED RELEASE ORAL at 05:38

## 2019-04-14 RX ADMIN — OMEPRAZOLE 20 MG: 20 CAPSULE, DELAYED RELEASE ORAL at 05:37

## 2019-04-14 ASSESSMENT — ENCOUNTER SYMPTOMS
DIARRHEA: 0
DEPRESSION: 0
WEAKNESS: 0
SPEECH CHANGE: 0
VOMITING: 0
BLURRED VISION: 0
ABDOMINAL PAIN: 0
NERVOUS/ANXIOUS: 0
NAUSEA: 0
HEADACHES: 0
SORE THROAT: 0
SHORTNESS OF BREATH: 0
CONSTIPATION: 0
PHOTOPHOBIA: 0
COUGH: 0
BACK PAIN: 0
FEVER: 0
CHILLS: 0
HEARTBURN: 0
INSOMNIA: 0
DIZZINESS: 0
MYALGIAS: 1
SENSORY CHANGE: 0
CLAUDICATION: 0

## 2019-04-14 NOTE — CARE PLAN
Problem: Respiratory:  Goal: Respiratory status will improve  Outcome: PROGRESSING AS EXPECTED  On RA. Oxygen mid 90s. Denies SOA.    Problem: Pain Management  Goal: Pain level will decrease to patient's comfort goal  Outcome: PROGRESSING AS EXPECTED  Pain to lower back. Prn ibuprofen and scheduled tylenol. Repositioned.

## 2019-04-14 NOTE — PROGRESS NOTES
A&Ox4. VSS on RA. Pain to back; prn ibuprofen and scheduled tylenol. Denies nausea. SHOBHA midline infusing IV abx. Incontinent of B/B. Fall precautions in place. Bed alarm on, call light within reach, bed low and locked, hourly rounding. No new needs at this time. Will continue to monitor throughout shift.

## 2019-04-14 NOTE — PROGRESS NOTES
Infectious Disease Progress Note    Author: Beatriz Johnson M.D. Date & Time of service: 4/14/2019  11:11 AM    Chief Complaint:  Multiple brain lesions  Iliopsoas lesion    Interval History:  68-year-old female with history of diabetes and breast cancer, admitted 3/8/2019 with abdominal pain, iliopsoas lesion, and an abnormal MRI with multiple brain lesions.  4/1/2019 no fevers.  Easily arousable.  ÁNGEL continues to drain.  4/2/2019-no fevers.  Awake and responsive but feels weak.  There is minimal drainage from the ÁNGEL.  As per the nursing the patient is incontinent of stool.  But when you talk to her she states she is just is not able to get up on time.  4/3/2019-complains of some weakness in the hand. No fevers. The drain is not draining much  4/4- no fevers. She is anxious since she has been NPO. Awaiting IR drain  4/5 no fevers.  Got her drain.  There was purulent fluid.  WBCs 4.6.  4/8 afebrile, white count 3.3.  IR drain remains in place.  Continues to have significant word finding difficulty.  4/9 per RN, patient more awake and interactive but remains confused with word finding difficulty.  Resting comfortably this morning  4/10 afebrile, white count 4.6.  Patient much more awake and interactive today.  Reports no new issues, reports no pain anywhere.  IR drain has been removed.  4/11 afebrile WBC 4.2 patient complains of pain in her legs and feet.  She is being assisted with feeding.  Denies any headaches or neck pain  4/12/2019 no fevers.  Patient is alert and oriented.  Although really slow.  Apparently when she gets pain medications she becomes confused.  But this morning she is answering all questions appropriately.  4/13/2019 patient is somewhat confused.  No fevers.  No new issues overnight.  4/14/2019 no fevers.  Somewhat confused.  WBC is 4.  Labs Reviewed    Review of Systems:  Review of Systems   Unable to perform ROS: Mental status change   Limited review of systems secondary to intermittent  confusion and word finding difficulty    Hemodynamics:  Temp (24hrs), Av °C (98.6 °F), Min:36.6 °C (97.9 °F), Max:37.8 °C (100 °F)  Temperature: 36.8 °C (98.2 °F)  Pulse  Av.9  Min: 59  Max: 153  Blood Pressure : 126/49       Physical Exam:  Physical Exam   Constitutional: She is oriented to person, place, and time. She appears well-developed and well-nourished. No distress.   HENT:   Head: Normocephalic and atraumatic.   Eyes: Pupils are equal, round, and reactive to light. EOM are normal. Right eye exhibits no discharge. Left eye exhibits no discharge.   Neck: Neck supple.   Cardiovascular: Normal rate and intact distal pulses.    Murmur heard.  Pulmonary/Chest: Effort normal and breath sounds normal. No respiratory distress. She has no wheezes.   Abdominal: Soft. Bowel sounds are normal. She exhibits no distension. There is no tenderness.   Musculoskeletal: She exhibits no edema, tenderness or deformity.   Right upper extremity PICC line   Lymphadenopathy:     She has no cervical adenopathy.   Neurological: She is alert and oriented to person, place, and time.   Moves all extremities   Skin: Skin is warm. No rash noted. She is not diaphoretic.   Nursing note and vitals reviewed.    Meds:    Current Facility-Administered Medications:   •  QUEtiapine  •  acetaminophen  •  furosemide  •  haloperidol lactate  •  gabapentin  •  ibuprofen  •  labetalol  •  loperamide  •  losartan  •  MD Alert...Vancomycin per Pharmacy  •  Notify provider if pain remains uncontrolled **AND** Use the numeric rating scale (NRS-11) on regular floors and Critical-Care Pain Observation Tool (CPOT) on ICUs/Trauma to assess pain **AND** Pulse Ox (Oximetry) **AND** Pharmacy Consult Request **AND** If patient difficult to arouse and/or has respiratory depression, stop any opiates that are currently infusing and call a Rapid Response. **AND** [DISCONTINUED] oxyCODONE immediate-release **AND** [DISCONTINUED] oxyCODONE immediate-release  **AND** [DISCONTINUED] morphine injection  •  omeprazole  •  ondansetron  •  ondansetron  •  potassium chloride SA  •  pramipexole  •  Respiratory Care per Protocol  •  vancomycin    Labs:  Recent Labs      04/12/19 0028  04/13/19 0029 04/14/19   0025   WBC  3.8*  3.6*  4.0*   RBC  4.47  4.75  4.70   HEMOGLOBIN  11.1*  11.9*  11.4*   HEMATOCRIT  35.8*  37.9  38.1   MCV  80.1*  79.8*  81.1*   MCH  24.8*  25.1*  24.3*   RDW  50.2*  48.8  50.3*   PLATELETCT  104*  119*  126*   MPV  10.0  10.8  10.4   NEUTSPOLYS  60.90  62.50  52.70   LYMPHOCYTES  16.60*  14.70*  23.70   MONOCYTES  16.10*  14.40*  14.20*   EOSINOPHILS  5.30  7.20*  8.20*   BASOPHILS  0.80  0.60  1.00     Recent Labs      04/12/19 0028  04/13/19 0029  04/14/19   0024   SODIUM  132*  135  135   POTASSIUM  4.4  4.3  5.3   CHLORIDE  102  102  105   CO2  25  27  25   GLUCOSE  83  103*  102*   BUN  13  19  22     Recent Labs      04/12/19 0028 04/13/19 0029 04/14/19   0024   CREATININE  0.44*  0.61  0.59       Imaging:  Ct-needle Bx-muscle Right    Result Date: 3/13/2019  3/11/2019 4:44 PM HISTORY/REASON FOR EXAM:  Right gluteal hypodense lesion. Moderate sedation was administered with continuous monitoring of the patient under the direct supervision of  Medications: 2 mg of Versed and 200 mcg of fentanyl Total sedation time 60 minutes Procedure: After the informed consent the patient was placed on the CT table on prone position. Localization CT scan was obtained. It demonstrates hypodense area in the right gluteus muscle. Subsequently a 17-gauge needle was advanced into the lesion. 20  mL of pus was aspirated. The materials were sent to the pathology. The patient tolerated the procedure without any immediate competition.     CT-guided aspiration of pus like material in the right gluteal muscle lesion.    Ec-echocardiogram Complete W/o Cont    Result Date: 3/13/2019  Transthoracic Echo Report Echocardiography Laboratory CONCLUSIONS  No prior study is available for comparison. Normal left ventricular systolic function. Left ventricular ejection fraction is visually estimated to be 65%. Normal diastolic function. Normal inferior vena cava size and inspiratory collapse. Aortic sclerosis without stenosis. Estimated right ventricular systolic pressure  is 33 mmHg. No obvious valvular vegetations are noted on a decent quality study.  If further valvular assessment is clinically indicated, consider transesophageal echocardiogram. SAM,  LV EF:  65    % FINDINGS Left Ventricle Normal left ventricular size and systolic function. Normal left ventricular wall thickness. Left ventricular ejection fraction is visually estimated to be 65%. Normal diastolic function. Right Ventricle Normal right ventricular size and systolic function. Right Atrium Normal right atrial size. Normal inferior vena cava size and inspiratory collapse. Left Atrium Normal left atrial size. Mitral Valve Structurally normal mitral valve without significant stenosis or regurgitation. Aortic Valve Aortic sclerosis without stenosis. No aortic insufficiency. Tricuspid Valve Structurally normal tricuspid valve. Mild tricuspid regurgitation. Right atrial pressure is estimated to be 3 mmHg. Estimated right ventricular systolic pressure  is 33 mmHg. Pulmonic Valve The pulmonic valve is not well visualized. No pulmonic insufficiency. Pericardium Normal pericardium without effusion. Aorta The aortic root is normal.  Ascending aorta diameter is 3.0 cm. Claire Tripathi MD (Electronically Signed) Final Date:     13 March 2019                 14:38      Micro:  Results     Procedure Component Value Units Date/Time    URINALYSIS [[id number]]  (Abnormal) Collected:  04/12/19 1148    Order Status:  Completed Specimen:  Urine from Urine, Cath Updated:  04/12/19 1246     Color Yellow     Character Clear     Specific Gravity 1.011     Ph 7.0     Glucose Negative mg/dL      Ketones Negative mg/dL       Protein 30 (A) mg/dL      Bilirubin Negative     Urobilinogen, Urine 0.2     Nitrite Negative     Leukocyte Esterase Trace (A)     Occult Blood Negative     Micro Urine Req Microscopic    Narrative:       Collected By:[id number] MAVIS URIBE          Assessment:  Active Hospital Problems    Diagnosis   • *Lesion of brain [G93.9]   • Mass of iliopsoas muscle group [M62.89]   • Encephalopathy [G93.40]   •    • History of breast cancer [Z85.3]   • Diabetes mellitus type 2 in obese (HCC) [E11.69, E66.9]       Plan:  Multiple brain lesions-likely abscesses versus metastatic lesions  Leukopenia  Confusion improving  MRI with scattered small lesions incl aaron, too small to biopsy per neurosurgery  TTE neg; CHAS neg  Bcxs neg to date  Repeat CT scan on 3/30/2019 shows decrease in the size of the brain lesions, supporting that these are abscesses  Repeat MRI brain prior to completion of abx    Iliopsoas abscess, on treatment  CT + 2.6 cm lesion in the right iliopsoas region  S/p aspiration +WBCs-cultures negative to date  Bcxs remain neg  Last vanc trough of 20.4 and 4/5  D/c cefepime 03/26 - may be contributing to confusion  Tolerating Meropenem started 03/26  MRI w/ contrast lumbar, pelvis 03/27 - Iliopsoas and gluteal fluid collections noted.    Drain placed 3/29/2019  Cultures are negative so far  Repeat CT scan shows resolution of the abscess in the gluteus but increase in the iliopsoas  Another drain was placed on 4/4/2019 and it shows purulent fluid  Repeat CT from 4/8 with resolution of the fluid collections after drainage per my read  Aim  for at least 6 weeks of IV antibiotics (end date: 4/24/2018) likely followed by p.o. suppression given that the infection is adjacent to hardware  Consider repeat CHAS at some point given her multiple brain lesions suspicious for infective endocarditis  Continue broad-spectrum IV vancomycin and meropenem for now.  Not attempting de-escalation at this time since we have no positive  cultures and given the consequences of worsening brain abscesses  Monitor renal function and Vanco trough    Right hip hardware  Likely infected  This is the likely source of infection.  I spoke to orthopedics about logistics of taking the hardware out.  Await orthopedic follow-up  H/o breast cancer  Markedly elevated alk phos, trending down  Continued diaz for mets    Type 2 diabetes mellitus  Hemoglobin A1c 6.3  Keep BS under 150 to help control current infection    Altered mental status  Appears to be waxing and waning  Multifactorial, likely large contribution from her multiple brain abscesses, monitor  The CT scan done on 4/13/2019 shows persistence of the lesions    I have performed a physical exam and reviewed and updated ROS and plan today 4/14/2019.  In review of yesterday's note 4/13/2019, there are no changes except as documented above.    Continue current plan as outlined above    Discussed with IM Dr. Keller

## 2019-04-14 NOTE — PROGRESS NOTES
Pharmacy Kinetics 68 y.o. female on vancomycin day # 21 2019    Currently on Vancomycin 600 mg iv q24hr     Indication for Treatment: possible brain abscesses     Pertinent history per medical record: Admitted on 3/8/2019 for iliopsoas mass and multiple brain lesions.  There is concern that this is infectious.  All cultures are negative to date, but were all drawn while on antibiotics. CHAS was negative.  Patient has a history of breast cancer and DM.  ID is following.  Plan for completion of abx (6 weeks) in house prior to transfer to [facility] in [place].       Other antibiotics: meropenem 2 gm IV q8h     Allergies: Patient has no known allergies.      List concerns for renal function: age, and prolonged vancomycin duration, elevated BUN/SCr ratio     Pertinent cultures to date:   3/11/19 wound - right gluteal mass: negative  3/12/19 blood - peripheral x 2: negative  3/29/19 wound - right buttock: negative  3/31/19 blood - peripheral x 2: negative   19 wound - retroperitoneal drain aspirate: negative     MRSA nares swab if pneumonia is a concern (ordered/positive/negative/n-a): n/a    Recent Labs      19   0028  19   0029  19   0025   WBC  3.8*  3.6*  4.0*   NEUTSPOLYS  60.90  62.50  52.70     Recent Labs      19   0028  19   0029  19   0024   BUN  13  19  22   CREATININE  0.44*  0.61  0.59     Recent Labs      19   1929   VANCOTROUGH  17.2   No intake or output data in the 24 hours ending 19 1528   /49   Pulse 63   Temp 36.8 °C (98.2 °F) (Temporal)   Resp 19   Ht 1.524 m (5')   Wt 60.4 kg (133 lb 2.5 oz)   SpO2 97%  Temp (24hrs), Av °C (98.6 °F), Min:36.6 °C (97.9 °F), Max:37.8 °C (100 °F)      A/P   1. Vancomycin dose change: not indicated at this time   2. Next vancomycin level: 3 days   3. Goal trough: 16-20 mcg/mL  4. Comments: Afebrile, renal indices wnl, no charted I/O, continue broad spectrum abx despite no positive blood cultures per ID.   Pharmacy to monitor level and adjust vanco if needed.       Gisselle Messina, PharmD, BCOP

## 2019-04-14 NOTE — PROGRESS NOTES
Received change of shift report from roni RN. Assumed pt. Care at 0700. Pt. aox3-4, 2 assist, no signs of distress. Midline SL, flushed, no blood return. Pt. Up to chair for breakfast. Pt. Talked on the phone with her brother. Plan of care discussed, questions answered. Bed in lowest position with wheels locked, bed alarm and hourly rounding in place. Call light within reach.

## 2019-04-14 NOTE — PROGRESS NOTES
"[facility] Medicine Daily Progress Note    Date of Service  4/14/2019    Chief Complaint  68 y.o. female admitted 3/8/2019 with right hip pain, fever and abnormal brain mri.    Hospital Course    Patient started on empiric antibiotics given fever.  She had abnormal findings on MRI brain and CT showed \"lesion\" on right iliopsoas.  This lesion was biopsied and turned out to be abscess.  She has history of breast cancer and there is also concern of brain lesions being metastatic though their appearance is not typical of this.      Interval Problem Update  3/12 Discussed with Dr Turner for second opinion of brain lesions and based on appearance not able to r/o or r/i any diagnosis.  Given patient's presentation of confusion, hip pain and fever  - this is more supportive of infectious etiology rather than malignant.  Fluid from right gluteal area sent to micro and as of yet - no growth.  Continue zosyn for now.  3/13 Patient with slight improvement in mentation.  Blood cultures and abscess cultures are showing no growth at this time.  TTE being done while I examined patient - no evidence of vegetations on this test.  ID consultation pending - d/w Dr Garcia.  3/14 Patient feeling well today, her pain is present but not as bad as prior to admission.  She was able to follow the conversation today and is agreeable to move forward with the CHAS tomorrow.  I spoke with her brother, René, and updated him on condition yesterday afternoon also.  Will also have PICC placed in next few days as long as blood cultures remain negative as I expect a prolonged antibiotic course of treatment.  3/15 Patient without new complaints, states she needs help to move in bed.  CHAS showed no evidence of vegetations and regular diet resumed.  Culture remains negative and biopsy of brain lesion may prove needed - will watch through the weekend and if mentation does not continue to improve, will discuss with neurosurgery on Monday.  3/16 Patient states " she is okay today, mentation has waxed and waned without significant improvement.  She was febrile earlier today.  No other acute events.  3/17 Patient with significant improvement in mentation today, she is aware of her hospitalization, not falling asleep mid sentence.  Her pain mediations were held most of yesterday and likely far too strong for patient and were affecting her awareness.  Oxycodone 5 discontinued and will use tramadol instead unless pain not well controlled.  CT brain with contrast ordered as a quick scan for comparison to MRI.    3/18 Patient feeling same today, discussed need to discuss with neurosurgery for possible biopsy.  Discussed with Dr Nguyen, he reviewed MRI and CT brain and he feels they are likely metastatic lesions but are far too small to biopsy.  His recommendation is to continue with antibiotics and re-image in 2 weeks to see if any change that would be amenable to biopsy or that would further declare infection.  3/26 Patient mental status continues to wax and wane.  ID ordered MRI lumbar, pelvis and sacrum for evaluation for source of infection, patient with continued low back pain and no real improvement since I saw her 7 days prior with continued antibiotics during this time.  Cultures have not growth pathogen, repeat MRI 3/28.   3/27 Patient somnolent most of the day today, MRI's completed and showing 2 areas of fluid collections, given her history are likely abscesses and will require drainage, CT guided drainage requested and patient NPO at MN for this.  D/w ID - cultures will be done on fluid collection.  Repeat CT brain ordered for tomorrow.  3/28 Patient up to chair, diet resumed as lovenox given this am and drainage of abscesses deferred until tomorrow.  Vanco/merrem to continue and cultures will be collected from abscesses.  3/29 Patient went to IR today, had drain placed in the right buttock into abscess cavity and fluid sent for culture.  Potassium is slightly low,  continue with PO replacement.  HR has improved from yesterday but patient PO intake still not at normal level, increase IVF rate to 100cc/hour.  3/30 Patient without fever since drain placed right buttock.  Purulent material in ÁNGEL bulb. Cultures thus far are still showing no growth.  Antibiotics to continue.  CT head ordered to evaluate change in past 2 weeks as recommended by neurosurgery.  3/31 Patient with fevers overnight - was altered during this time but has recovered.  She denies pain when examined by me.  She still has purulent material in the drainage tubing.  Will continue with current IV antibiotics - 6 weeks total antibiotics suspected - end date would be 4/24/19.  D/w ID.  4/1 Patient remains intermittently somnolent.  She wakes easily but falls back to sleep quickly.  She remained afebrile overnight.  ÁNGEL drain continues to drain purulent material.  ABX through 4/24 - once accepting facility found, patient okay to transfer with midline intact for completion of antibiotics.    4/9 Patient somnolent when examined.  CT showed resolution of fluid collection where ÁNGEL in place.  RN to remove ÁNGEL today.  All cultures still remain without growth.  4/10 Patient seen when working with OT, she is awake but gets off task quickly and is easily fatigued.  ÁNGEL drain removed yesterday.  Patient states she has pain in the lumbar spine when she coughs - just above her surgical area.  She has been in bed for nearly 1 month and this is likely not helping her back pain - educated need for OOB as often as tolerated.  4/11 Patient had bowel incontinence prior to my entering room, states she thinks she needs a bed pan.  CNA notified.  She continues to have low back pain in the area of her surgery and given her bed-bound status during her infection and intermittent confusion, this is not unexpected.  Continuing with therapy recommended and patient is 1:1 feeding given her weakness to feed herself.  4/12 Patient tearful this am,  she is concerned that she will not walk again, support given and educated that she is weak and the inability to walk is only temporary.  She needs to work with therapy and get stronger and I need to stop her narcotics and only give tyelnol/ibuprofen for pain management along with non-narcotic alternatives.  I spoke to patient's brother, René, at her request.  4/13 Patient up to chair when evaluated, mentation improved with discontinuation of narcotics. Brother is coming to visit this weekend and she is looking forward to this.  Ortho recommendations still pending - per ID she spoke to Dr Porter who is consulting Dr Sandra for recommendations.  4/14 Patient up to chair with assistance today.  CT brain remains same as prior 2 weeks ago.  Still pending ortho consultation.    Consultants/Specialty  ID - Sarah/Ashish  Ortho - pending.    Code Status  full    Disposition  SNF/[facility]AC in [place]    Review of Systems  Review of Systems   Constitutional: Negative for chills and fever.   HENT: Negative for congestion and sore throat.    Eyes: Negative for blurred vision and photophobia.   Respiratory: Negative for cough and shortness of breath.    Cardiovascular: Negative for chest pain, claudication and leg swelling.   Gastrointestinal: Negative for abdominal pain, constipation, diarrhea, heartburn, nausea and vomiting.   Genitourinary: Negative for dysuria and hematuria.   Musculoskeletal: Positive for myalgias. Negative for back pain (lumbar/sacral area) and joint pain.   Skin: Negative for itching and rash.   Neurological: Negative for dizziness, sensory change, speech change, weakness and headaches.   Psychiatric/Behavioral: Negative for depression. The patient is not nervous/anxious and does not have insomnia.         Physical Exam  Temp:  [36.6 °C (97.9 °F)-37.8 °C (100 °F)] 36.8 °C (98.2 °F)  Pulse:  [63-82] 63  Resp:  [14-19] 19  BP: (120-145)/(49-67) 126/49  SpO2:  [92 %-100 %] 97 %    Physical Exam    Constitutional: She is oriented to person, place, and time. She appears well-developed and well-nourished. No distress.   HENT:   Head: Normocephalic and atraumatic.   Eyes: Conjunctivae are normal. No scleral icterus.   Neck: Neck supple. No JVD present.   Cardiovascular: Normal rate, regular rhythm and normal heart sounds.  Exam reveals no gallop and no friction rub.    No murmur heard.  Pulmonary/Chest: Effort normal and breath sounds normal. No respiratory distress. She exhibits no tenderness.   Abdominal: Soft. Bowel sounds are normal. She exhibits no distension. There is no guarding.   Musculoskeletal: She exhibits no edema or tenderness.   ÁNGEL drain removed       Lymphadenopathy:     She has no cervical adenopathy.   Neurological: She is alert and oriented to person, place, and time. No cranial nerve deficit.   Mentation labile     Skin: Skin is warm and dry. She is not diaphoretic. No erythema. No pallor.   Psychiatric: She has a normal mood and affect. Her behavior is normal.   Nursing note and vitals reviewed.      Fluids  No intake or output data in the 24 hours ending 04/14/19 1109    Laboratory  Recent Labs      04/12/19   0028  04/13/19   0029  04/14/19   0025   WBC  3.8*  3.6*  4.0*   RBC  4.47  4.75  4.70   HEMOGLOBIN  11.1*  11.9*  11.4*   HEMATOCRIT  35.8*  37.9  38.1   MCV  80.1*  79.8*  81.1*   MCH  24.8*  25.1*  24.3*   MCHC  31.0*  31.4*  29.9*   RDW  50.2*  48.8  50.3*   PLATELETCT  104*  119*  126*   MPV  10.0  10.8  10.4     Recent Labs      04/12/19   0028  04/13/19   0029  04/14/19   0024   SODIUM  132*  135  135   POTASSIUM  4.4  4.3  5.3   CHLORIDE  102  102  105   CO2  25  27  25   GLUCOSE  83  103*  102*   BUN  13  19  22   CREATININE  0.44*  0.61  0.59   CALCIUM  10.2  10.4  10.3                   Imaging  CT-HEAD WITH & W/O   Final Result      1.  No change in scattered hyperenhancing lesions throughout the brain, cerebellum and visualized brainstem. Some of them demonstrate mild  associated edema, similar to prior study.   2.  No CT evidence of infarct or hemorrhage.      CT-NEEDLE BX-MUSCLE RIGHT   Final Result   Addendum 1 of 1   Addendum:   The biopsy/drainage was done utilizing low dose optimization CT techniques    including auto modulation for  imaging and low mA CT/fluoroscopy mode    for the procedure.      Final      CT-guided aspiration of pus like material in the right gluteal muscle lesion.      CT-ABDOMEN-PELVIS WITH   Final Result      1.  Resolution of right iliacus abscess with no associated fluid adjacent to the drainage catheter      2.  Interval removal of right gluteal drainage catheter      3.  Surgical screw traversing both sacroiliac joint with associated pathologic fracture of the right ilium and right medial sacrum      4.  Hepatomegaly      CT-DRAIN-HEMATOMA - SEROMA   Final Result      1.  CT-GUIDED RETROPERITONEAL (EXTRAPERITONEAL) RIGHT PELVIC ABSCESS DRAINAGE AT THE RIGHT ILIOPSOAS. ORDERS WERE WRITTEN FOR ONCE DAILY IRRIGATION OF THE CATHETER WITH 5 ML STERILE SALINE.      2.  THE CURRENT PLAN IS TO MONITOR DRAINAGE OUTPUT AND OBTAIN A FOLLOWUP CT SCAN IN 5-7 DAYS IF CLINICALLY INDICATED.      CT-ABDOMEN-PELVIS WITH   Final Result      1.  Rim-enhancing fluid collection in the right iliopsoas muscle may have increased in size slightly from the prior exam.      2.  Pigtail catheter in the right gluteus medius muscle. No residual fluid collection is appreciated.      3.  Pathologic fracture of the right ilium. Status post right SI joint fusion.      4.  Atherosclerosis.      5.  Cholelithiasis.      CT-HEAD WITH & W/O   Final Result      1.  Interval decrease in size and level of enhancement of multiple small punctate foci in both cerebral hemispheres and in the midbrain consistent with improving septic emboli.      2.  No hemorrhage or mass effect.      CT-DRAIN-RETROPERITONEAL   Final Result      1.  CT GUIDED CATHETER DRAINAGE OF RIGHT GLUTEAL ABSCESS.    2.  THE CURRENT PLAN IS TO OBTAIN A FOLLOW-UP CT SCAN IN 5-7 DAYS IF INDICATED. ORDERS WERE WRITTEN FOR ONCE DAILY IRRIGATION OF THE CATHETER WITH 5 ML STERILE SALINE FOR 3 DAYS.   3. THE ILIOPSOAS COLLECTION WAS NOT AMENABLE TO CATHETER DRAINAGE AS INTERVENING BOWEL AND/OR BONY STRUCTURES OBSTRUCT A PATHWAY TO THIS COLLECTION AT THIS TIME.      MR-LUMBAR SPINE-WITH & W/O   Final Result      1.  There are multifocal enhancing fluid collections in the right iliopsoas muscles and gluteus muscles adjacent to the right ilium. Right iliopsoas fluid collection measures an approximately 3.9 x 2.9 cm. The right gluteal fluid collection measures an    approximately 3.5 x 2.8 cm in size. The differential diagnosis includes postsurgical seroma and abscess.   2.  Post operative and degenerative changes as described above.      MR-PELVIS-WITH & W/O AND SEQUENCES   Final Result      1.  Surgical screw traverses both sacroiliac joint across presumed pathologic fracture of the right sacrum and the hardware obscures the adjacent tissues.      2.  No other evidence for bony metastatic disease      3.  Right gluteal musculature rim-enhancing fluid collection measuring 3.4 x 2.8 cm. This could be a postoperative seroma versus abscess      4.  osteoarthritis of both hip joint      5.  Prior hysterectomy      IR-MIDLINE CATHETER INSERTION WO GUIDANCE > AGE 3   Final Result                  Ultrasound-guided midline placement performed by qualified nursing staff    as above.          CT-HEAD WITH   Final Result      Multiple subcentimeter too numerous to count enhancing masses scattered throughout the supratentorial and infratentorial brain. These demonstrate some surrounding vasogenic edema and most likely represent multifocal metastatic lesions. Other    considerations would include multifocal abscesses or septic emboli.      EC-CHAS W/O CONT   Final Result      EC-CHAS W/O CONT   Final Result      EC-ECHOCARDIOGRAM COMPLETE W/O CONT    Final Result      OUTSIDE IMAGES-CT CHEST/ABDOMEN/PELVIS   Final Result      OUTSIDE IMAGES-DX CHEST   Final Result      [id number] FILM X-RAY   Final Result      OUTSIDE IMAGES-MR BRAIN   Final Result      OUTSIDE IMAGES-MR BRAIN   Final Result           Assessment/Plan  * Lesion of brain   Assessment & Plan    Metastatic cancer vs infectious etiology  D/w Dr Black for questionable brain mets, recommending IR Bx of psoas muscle   IR Bx of psoas muscle ordered - was abscess  Empiric treatment of infection  TTE and CHAS negative for vegetations.  D/w Dr Nguyen - lesions likely metastatic based on his interpretation of MRI/CT - too small to biopsy, recommends continuing antibiotics and re-imaging in 2 weeks to see if there has been any change.  CT brain to be repeated - lesions same as prior 2 weeks ago.       Encephalopathy   Assessment & Plan    Waxes and wanes  Improved with dc of narcotics and using scheduled tyelnol for pain.         Mass of iliopsoas muscle group   Assessment & Plan    Return of fluid collection, repeat drainage in IR with cultures, drain placed 3/29 - 10ml purulent material drained and sent for culture - no growth a/o 3/30  Was on linezolid and cefepime and now on vanco/merrem - end date to be 6 weeks of treatment. (4/24/19)  ID consulting   Repeat CT abdomen shows resolution of fluid collection and ÁNGEL output dwindling - removed ÁNGEL drain         Normocytic anemia   Assessment & Plan    stable     Hyponatremia- (present on admission)   Assessment & Plan    Mildly low - doubt contribution to mentation         RLS (restless legs syndrome)- (present on admission)   Assessment & Plan    Pramipexole       Diabetes mellitus type 2 in obese (HCC)- (present on admission)   Assessment & Plan    Well controlled   Short acting insulin as needed   Accu-Checks, hypoglycemia protocol          History of breast cancer- (present on admission)   Assessment & Plan    infection to brain  No known active  breast cancer.       COPD (chronic obstructive pulmonary disease) (HCC)- (present on admission)   Assessment & Plan    Oxygen as needed, Respiratory protocol, Bronchodilators, Incentive spirometry      Urinary tract infection   Assessment & Plan    Culture remain no growth.       Fungal infection of the groin   Assessment & Plan    on nystatin powder 3/24     History of deep vein thrombosis- (present on admission)   Assessment & Plan    History of deep vein thrombosis   Was on Rivaroxaban as outpatient.     Held for Bx Mar 11  Restart AC after completing abx treatment         Essential hypertension- (present on admission)   Assessment & Plan    Restarted on losartan and furosemide with hold parameters.     GERD (gastroesophageal reflux disease)- (present on admission)   Assessment & Plan    Omeprazole     Chronic pain- (present on admission)   Assessment & Plan     Multifactorial  Likely secondary to abscess, fracture, bed bound status  Pain control            VTE prophylaxis: scds

## 2019-04-14 NOTE — CARE PLAN
Problem: Skin Integrity  Goal: Risk for impaired skin integrity will decrease  Outcome: PROGRESSING AS EXPECTED  Pt. Turning self from side to side, turns when prompted. Waffle cushion, barrier cream, and baby powder in use.     Problem: Psychosocial Needs:  Goal: Level of anxiety will decrease  Outcome: PROGRESSING AS EXPECTED  Pt. Can be very anxious, responding well to emotional support this shift. Requiring a lot of emotional support.

## 2019-04-15 LAB
ANION GAP SERPL CALC-SCNC: 6 MMOL/L (ref 0–11.9)
BASOPHILS # BLD AUTO: 0.5 % (ref 0–1.8)
BASOPHILS # BLD: 0.03 K/UL (ref 0–0.12)
BUN SERPL-MCNC: 15 MG/DL (ref 8–22)
CALCIUM SERPL-MCNC: 10.7 MG/DL (ref 8.5–10.5)
CHLORIDE SERPL-SCNC: 106 MMOL/L (ref 96–112)
CO2 SERPL-SCNC: 22 MMOL/L (ref 20–33)
CREAT SERPL-MCNC: 0.58 MG/DL (ref 0.5–1.4)
EOSINOPHIL # BLD AUTO: 0.12 K/UL (ref 0–0.51)
EOSINOPHIL NFR BLD: 2.2 % (ref 0–6.9)
ERYTHROCYTE [DISTWIDTH] IN BLOOD BY AUTOMATED COUNT: 51.1 FL (ref 35.9–50)
GLUCOSE SERPL-MCNC: 88 MG/DL (ref 65–99)
HCT VFR BLD AUTO: 40.8 % (ref 37–47)
HGB BLD-MCNC: 12.4 G/DL (ref 12–16)
IMM GRANULOCYTES # BLD AUTO: 0.02 K/UL (ref 0–0.11)
IMM GRANULOCYTES NFR BLD AUTO: 0.4 % (ref 0–0.9)
LYMPHOCYTES # BLD AUTO: 0.87 K/UL (ref 1–4.8)
LYMPHOCYTES NFR BLD: 15.7 % (ref 22–41)
MCH RBC QN AUTO: 24.5 PG (ref 27–33)
MCHC RBC AUTO-ENTMCNC: 30.4 G/DL (ref 33.6–35)
MCV RBC AUTO: 80.6 FL (ref 81.4–97.8)
MONOCYTES # BLD AUTO: 0.57 K/UL (ref 0–0.85)
MONOCYTES NFR BLD AUTO: 10.3 % (ref 0–13.4)
NEUTROPHILS # BLD AUTO: 3.92 K/UL (ref 2–7.15)
NEUTROPHILS NFR BLD: 70.9 % (ref 44–72)
NRBC # BLD AUTO: 0 K/UL
NRBC BLD-RTO: 0 /100 WBC
PLATELET # BLD AUTO: 143 K/UL (ref 164–446)
PMV BLD AUTO: 11.1 FL (ref 9–12.9)
POTASSIUM SERPL-SCNC: 5 MMOL/L (ref 3.6–5.5)
RBC # BLD AUTO: 5.06 M/UL (ref 4.2–5.4)
SODIUM SERPL-SCNC: 134 MMOL/L (ref 135–145)
WBC # BLD AUTO: 5.5 K/UL (ref 4.8–10.8)

## 2019-04-15 PROCEDURE — 700105 HCHG RX REV CODE 258: Performed by: INTERNAL MEDICINE

## 2019-04-15 PROCEDURE — 36415 COLL VENOUS BLD VENIPUNCTURE: CPT

## 2019-04-15 PROCEDURE — 700111 HCHG RX REV CODE 636 W/ 250 OVERRIDE (IP): Performed by: INTERNAL MEDICINE

## 2019-04-15 PROCEDURE — 700102 HCHG RX REV CODE 250 W/ 637 OVERRIDE(OP): Performed by: INTERNAL MEDICINE

## 2019-04-15 PROCEDURE — 80048 BASIC METABOLIC PNL TOTAL CA: CPT

## 2019-04-15 PROCEDURE — 99232 SBSQ HOSP IP/OBS MODERATE 35: CPT | Performed by: INTERNAL MEDICINE

## 2019-04-15 PROCEDURE — A9270 NON-COVERED ITEM OR SERVICE: HCPCS | Performed by: INTERNAL MEDICINE

## 2019-04-15 PROCEDURE — 770004 HCHG ROOM/CARE - ONCOLOGY PRIVATE *

## 2019-04-15 PROCEDURE — 99233 SBSQ HOSP IP/OBS HIGH 50: CPT | Performed by: INTERNAL MEDICINE

## 2019-04-15 PROCEDURE — 85025 COMPLETE CBC W/AUTO DIFF WBC: CPT

## 2019-04-15 PROCEDURE — 97530 THERAPEUTIC ACTIVITIES: CPT

## 2019-04-15 RX ORDER — HALOPERIDOL 5 MG/ML
1-2 INJECTION INTRAMUSCULAR EVERY 6 HOURS PRN
Status: DISCONTINUED | OUTPATIENT
Start: 2019-04-15 | End: 2019-04-25

## 2019-04-15 RX ADMIN — VANCOMYCIN HYDROCHLORIDE 600 MG: 100 INJECTION, POWDER, LYOPHILIZED, FOR SOLUTION INTRAVENOUS at 20:56

## 2019-04-15 RX ADMIN — GABAPENTIN 300 MG: 300 CAPSULE ORAL at 05:00

## 2019-04-15 RX ADMIN — GABAPENTIN 300 MG: 300 CAPSULE ORAL at 18:07

## 2019-04-15 RX ADMIN — PRAMIPEXOLE DIHYDROCHLORIDE 0.25 MG: 0.25 TABLET ORAL at 18:06

## 2019-04-15 RX ADMIN — QUETIAPINE FUMARATE 25 MG: 25 TABLET ORAL at 18:07

## 2019-04-15 RX ADMIN — FUROSEMIDE 20 MG: 20 TABLET ORAL at 05:00

## 2019-04-15 RX ADMIN — ACETAMINOPHEN 650 MG: 325 TABLET, FILM COATED ORAL at 11:30

## 2019-04-15 RX ADMIN — POTASSIUM CHLORIDE 40 MEQ: 1500 TABLET, EXTENDED RELEASE ORAL at 18:06

## 2019-04-15 RX ADMIN — OMEPRAZOLE 20 MG: 20 CAPSULE, DELAYED RELEASE ORAL at 05:00

## 2019-04-15 RX ADMIN — LOSARTAN POTASSIUM 50 MG: 50 TABLET ORAL at 05:00

## 2019-04-15 RX ADMIN — ACETAMINOPHEN 650 MG: 325 TABLET, FILM COATED ORAL at 05:00

## 2019-04-15 RX ADMIN — POTASSIUM CHLORIDE 40 MEQ: 1500 TABLET, EXTENDED RELEASE ORAL at 05:00

## 2019-04-15 RX ADMIN — ACETAMINOPHEN 650 MG: 325 TABLET, FILM COATED ORAL at 18:06

## 2019-04-15 RX ADMIN — HALOPERIDOL LACTATE 2 MG: 5 INJECTION, SOLUTION INTRAMUSCULAR at 20:56

## 2019-04-15 ASSESSMENT — ENCOUNTER SYMPTOMS
BACK PAIN: 0
HEADACHES: 0
DIARRHEA: 0
BLURRED VISION: 0
CHILLS: 0
ABDOMINAL PAIN: 0
SORE THROAT: 0
WEAKNESS: 0
DEPRESSION: 0
BACK PAIN: 1
COUGH: 0
MYALGIAS: 1
FEVER: 0
MYALGIAS: 0
VOMITING: 0
INSOMNIA: 0
CONSTIPATION: 0
CLAUDICATION: 0
SENSORY CHANGE: 0
SPEECH CHANGE: 0
SHORTNESS OF BREATH: 0
HEARTBURN: 0
NERVOUS/ANXIOUS: 0
PHOTOPHOBIA: 0
NAUSEA: 0
DIZZINESS: 0

## 2019-04-15 ASSESSMENT — COGNITIVE AND FUNCTIONAL STATUS - GENERAL
CLIMB 3 TO 5 STEPS WITH RAILING: TOTAL
WALKING IN HOSPITAL ROOM: TOTAL
SUGGESTED CMS G CODE MODIFIER MOBILITY: CM
MOVING TO AND FROM BED TO CHAIR: UNABLE
MOBILITY SCORE: 7
TURNING FROM BACK TO SIDE WHILE IN FLAT BAD: UNABLE
MOVING FROM LYING ON BACK TO SITTING ON SIDE OF FLAT BED: UNABLE
STANDING UP FROM CHAIR USING ARMS: A LOT

## 2019-04-15 ASSESSMENT — GAIT ASSESSMENTS: GAIT LEVEL OF ASSIST: UNABLE TO PARTICIPATE

## 2019-04-15 NOTE — PROGRESS NOTES
Pt is lethargic and tired today. Per previous shifts report, she waxes and wanes in her LOC and strength. Today she was disoriented and very weak. Could not hold a fork for breakfast, had to be fed. By dinnertime she was much stronger.   Mirapex given for painful legs. She was incontinent at 1200. She has not voided since. She denies feeling pressure in her bladder or abdominal discomfort.   Maintained [id number]. Will cont to monitor.

## 2019-04-15 NOTE — CARE PLAN
Problem: Communication  Goal: The ability to communicate needs accurately and effectively will improve  Outcome: PROGRESSING AS EXPECTED      Problem: Safety  Goal: Will remain free from injury  Outcome: PROGRESSING AS EXPECTED      Problem: Infection  Goal: Will remain free from infection  Outcome: PROGRESSING AS EXPECTED      Problem: Venous Thromboembolism (VTW)/Deep Vein Thrombosis (DVT) Prevention:  Goal: Patient will participate in Venous Thrombosis (VTE)/Deep Vein Thrombosis (DVT)Prevention Measures  Outcome: PROGRESSING AS EXPECTED

## 2019-04-15 NOTE — THERAPY
"Pt demonstrating decreased trunk control and seated balance. Pt continues to require physical assist and use of bed featires for bed mobility. Pt demonstrated decreased LE strength and ROM. Pt limtied by fatigue, stating \"they gave me too much pills\". Pt would benefit from further acute PT txs to progress towards goals and independence. Recommend post acute placementat an inpatient transitional care facility to address deficits.    Physical Therapy Treatment completed.   Bed Mobility:  Supine to Sit: Maximal Assist  Transfers: Sit to Stand: Unable to Participate  Gait: Level Of Assist: Unable to Participate       Plan of Care: Will benefit from Physical Therapy 4 times per week    See \"Rehab Therapy-Acute\" Patient Summary Report for complete documentation.       "

## 2019-04-15 NOTE — PROGRESS NOTES
"[facility] Medicine Daily Progress Note    Date of Service  4/15/2019    Chief Complaint  68 y.o. female admitted 3/8/2019 with right hip pain, fever and abnormal brain mri.    Hospital Course    Patient started on empiric antibiotics given fever.  She had abnormal findings on MRI brain and CT showed \"lesion\" on right iliopsoas.  This lesion was biopsied and turned out to be abscess.  She has history of breast cancer and there is also concern of brain lesions being metastatic though their appearance is not typical of this.      Interval Problem Update  3/12 Discussed with Dr Turner for second opinion of brain lesions and based on appearance not able to r/o or r/i any diagnosis.  Given patient's presentation of confusion, hip pain and fever  - this is more supportive of infectious etiology rather than malignant.  Fluid from right gluteal area sent to micro and as of yet - no growth.  Continue zosyn for now.  3/13 Patient with slight improvement in mentation.  Blood cultures and abscess cultures are showing no growth at this time.  TTE being done while I examined patient - no evidence of vegetations on this test.  ID consultation pending - d/w Dr Garcia.  3/14 Patient feeling well today, her pain is present but not as bad as prior to admission.  She was able to follow the conversation today and is agreeable to move forward with the CHAS tomorrow.  I spoke with her brother, René, and updated him on condition yesterday afternoon also.  Will also have PICC placed in next few days as long as blood cultures remain negative as I expect a prolonged antibiotic course of treatment.  3/15 Patient without new complaints, states she needs help to move in bed.  CHAS showed no evidence of vegetations and regular diet resumed.  Culture remains negative and biopsy of brain lesion may prove needed - will watch through the weekend and if mentation does not continue to improve, will discuss with neurosurgery on Monday.  3/16 Patient states " she is okay today, mentation has waxed and waned without significant improvement.  She was febrile earlier today.  No other acute events.  3/17 Patient with significant improvement in mentation today, she is aware of her hospitalization, not falling asleep mid sentence.  Her pain mediations were held most of yesterday and likely far too strong for patient and were affecting her awareness.  Oxycodone 5 discontinued and will use tramadol instead unless pain not well controlled.  CT brain with contrast ordered as a quick scan for comparison to MRI.    3/18 Patient feeling same today, discussed need to discuss with neurosurgery for possible biopsy.  Discussed with Dr Nguyen, he reviewed MRI and CT brain and he feels they are likely metastatic lesions but are far too small to biopsy.  His recommendation is to continue with antibiotics and re-image in 2 weeks to see if any change that would be amenable to biopsy or that would further declare infection.  3/26 Patient mental status continues to wax and wane.  ID ordered MRI lumbar, pelvis and sacrum for evaluation for source of infection, patient with continued low back pain and no real improvement since I saw her 7 days prior with continued antibiotics during this time.  Cultures have not growth pathogen, repeat MRI 3/28.   3/27 Patient somnolent most of the day today, MRI's completed and showing 2 areas of fluid collections, given her history are likely abscesses and will require drainage, CT guided drainage requested and patient NPO at [facility] for this.  D/w ID - cultures will be done on fluid collection.  Repeat CT brain ordered for tomorrow.  3/28 Patient up to chair, diet resumed as lovenox given this am and drainage of abscesses deferred until tomorrow.  Vanco/merrem to continue and cultures will be collected from abscesses.  3/29 Patient went to IR today, had drain placed in the right buttock into abscess cavity and fluid sent for culture.  Potassium is slightly low,  continue with PO replacement.  HR has improved from yesterday but patient PO intake still not at normal level, increase IVF rate to 100cc/hour.  3/30 Patient without fever since drain placed right buttock.  Purulent material in ÁNGEL bulb. Cultures thus far are still showing no growth.  Antibiotics to continue.  CT head ordered to evaluate change in past 2 weeks as recommended by neurosurgery.  3/31 Patient with fevers overnight - was altered during this time but has recovered.  She denies pain when examined by me.  She still has purulent material in the drainage tubing.  Will continue with current IV antibiotics - 6 weeks total antibiotics suspected - end date would be 4/24/19.  D/w ID.  4/1 Patient remains intermittently somnolent.  She wakes easily but falls back to sleep quickly.  She remained afebrile overnight.  ÁNGEL drain continues to drain purulent material.  ABX through 4/24 - once accepting facility found, patient okay to transfer with midline intact for completion of antibiotics.    4/9 Patient somnolent when examined.  CT showed resolution of fluid collection where ÁNGEL in place.  RN to remove ÁNGEL today.  All cultures still remain without growth.  4/10 Patient seen when working with OT, she is awake but gets off task quickly and is easily fatigued.  ÁNGEL drain removed yesterday.  Patient states she has pain in the lumbar spine when she coughs - just above her surgical area.  She has been in bed for nearly 1 month and this is likely not helping her back pain - educated need for OOB as often as tolerated.  4/11 Patient had bowel incontinence prior to my entering room, states she thinks she needs a bed pan.  CNA notified.  She continues to have low back pain in the area of her surgery and given her bed-bound status during her infection and intermittent confusion, this is not unexpected.  Continuing with therapy recommended and patient is 1:1 feeding given her weakness to feed herself.  4/12 Patient tearful this am,  she is concerned that she will not walk again, support given and educated that she is weak and the inability to walk is only temporary.  She needs to work with therapy and get stronger and I need to stop her narcotics and only give tyelnol/ibuprofen for pain management along with non-narcotic alternatives.  I spoke to patient's brother, René, at her request.  4/13 Patient up to chair when evaluated, mentation improved with discontinuation of narcotics. Brother is coming to visit this weekend and she is looking forward to this.  Ortho recommendations still pending - per ID she spoke to Dr Porter who is consulting Dr Sandra for recommendations.  4/14 Patient up to chair with assistance today.  CT brain remains same as prior 2 weeks ago.  Still pending ortho consultation.  4/15 Patient to be seen by ortho today per Dr Porter.  D/w dietary - patient with weight loss and poor po intake, she needs to be a 1:1 feed patient and if she fails this, will likely need tube feeding to get adequate nutrition.    Consultants/Specialty  ID - Sarah/Ashish Rogers - pending.    Code Status  full    Disposition  SNF/LTAC in [place]    Review of Systems  Review of Systems   Constitutional: Negative for chills and fever.   HENT: Negative for congestion and sore throat.    Eyes: Negative for blurred vision and photophobia.   Respiratory: Negative for cough and shortness of breath.    Cardiovascular: Negative for chest pain, claudication and leg swelling.   Gastrointestinal: Negative for abdominal pain, constipation, diarrhea, heartburn, nausea and vomiting.   Genitourinary: Negative for dysuria and hematuria.   Musculoskeletal: Positive for myalgias. Negative for back pain (lumbar/sacral area) and joint pain.   Skin: Negative for itching and rash.   Neurological: Negative for dizziness, sensory change, speech change, weakness and headaches.   Psychiatric/Behavioral: Negative for depression. The patient is not nervous/anxious  and does not have insomnia.    All other systems reviewed and are negative.       Physical Exam  Temp:  [36.7 °C (98.1 °F)-37.6 °C (99.7 °F)] 36.8 °C (98.3 °F)  Pulse:  [] 88  Resp:  [16-20] 16  BP: (116-153)/(63-92) 116/63  SpO2:  [92 %-96 %] 92 %    Physical Exam   Constitutional: She is oriented to person, place, and time. She appears well-developed and well-nourished. No distress.   HENT:   Head: Normocephalic and atraumatic.   Eyes: Conjunctivae are normal. No scleral icterus.   Neck: Neck supple. No JVD present.   Cardiovascular: Normal rate, regular rhythm and normal heart sounds.  Exam reveals no gallop and no friction rub.    No murmur heard.  Pulmonary/Chest: Effort normal and breath sounds normal. No respiratory distress. She exhibits no tenderness.   Abdominal: Soft. Bowel sounds are normal. She exhibits no distension. There is no guarding.   Musculoskeletal: She exhibits no edema or tenderness.   ÁNGEL drain removed       Lymphadenopathy:     She has no cervical adenopathy.   Neurological: She is alert and oriented to person, place, and time. No cranial nerve deficit.   Mentation labile     Skin: Skin is warm and dry. She is not diaphoretic. No erythema. No pallor.   Psychiatric: She has a normal mood and affect. Her behavior is normal.   Nursing note and vitals reviewed.      Fluids  No intake or output data in the 24 hours ending 04/15/19 1536    Laboratory  Recent Labs      04/13/19   0029  04/14/19   0025  04/15/19   0007   WBC  3.6*  4.0*  5.5   RBC  4.75  4.70  5.06   HEMOGLOBIN  11.9*  11.4*  12.4   HEMATOCRIT  37.9  38.1  40.8   MCV  79.8*  81.1*  80.6*   MCH  25.1*  24.3*  24.5*   MCHC  31.4*  29.9*  30.4*   RDW  48.8  50.3*  51.1*   PLATELETCT  119*  126*  143*   MPV  10.8  10.4  11.1     Recent Labs      04/13/19   0029  04/14/19   0024  04/15/19   0007   SODIUM  135  135  134*   POTASSIUM  4.3  5.3  5.0   CHLORIDE  102  105  106   CO2  27  25  22   GLUCOSE  103*  102*  88   BUN  19  22   15   CREATININE  0.61  0.59  0.58   CALCIUM  10.4  10.3  10.7*                   Imaging  CT-HEAD WITH & W/O   Final Result      1.  No change in scattered hyperenhancing lesions throughout the brain, cerebellum and visualized brainstem. Some of them demonstrate mild associated edema, similar to prior study.   2.  No CT evidence of infarct or hemorrhage.      CT-NEEDLE BX-MUSCLE RIGHT   Final Result   Addendum 1 of 1   Addendum:   The biopsy/drainage was done utilizing low dose optimization CT techniques    including auto modulation for  imaging and low mA CT/fluoroscopy mode    for the procedure.      Final      CT-guided aspiration of pus like material in the right gluteal muscle lesion.      CT-ABDOMEN-PELVIS WITH   Final Result      1.  Resolution of right iliacus abscess with no associated fluid adjacent to the drainage catheter      2.  Interval removal of right gluteal drainage catheter      3.  Surgical screw traversing both sacroiliac joint with associated pathologic fracture of the right ilium and right medial sacrum      4.  Hepatomegaly      CT-DRAIN-HEMATOMA - SEROMA   Final Result      1.  CT-GUIDED RETROPERITONEAL (EXTRAPERITONEAL) RIGHT PELVIC ABSCESS DRAINAGE AT THE RIGHT ILIOPSOAS. ORDERS WERE WRITTEN FOR ONCE DAILY IRRIGATION OF THE CATHETER WITH 5 ML STERILE SALINE.      2.  THE CURRENT PLAN IS TO MONITOR DRAINAGE OUTPUT AND OBTAIN A FOLLOWUP CT SCAN IN 5-7 DAYS IF CLINICALLY INDICATED.      CT-ABDOMEN-PELVIS WITH   Final Result      1.  Rim-enhancing fluid collection in the right iliopsoas muscle may have increased in size slightly from the prior exam.      2.  Pigtail catheter in the right gluteus medius muscle. No residual fluid collection is appreciated.      3.  Pathologic fracture of the right ilium. Status post right SI joint fusion.      4.  Atherosclerosis.      5.  Cholelithiasis.      CT-HEAD WITH & W/O   Final Result      1.  Interval decrease in size and level of enhancement of  multiple small punctate foci in both cerebral hemispheres and in the midbrain consistent with improving septic emboli.      2.  No hemorrhage or mass effect.      CT-DRAIN-RETROPERITONEAL   Final Result      1.  CT GUIDED CATHETER DRAINAGE OF RIGHT GLUTEAL ABSCESS.   2.  THE CURRENT PLAN IS TO OBTAIN A FOLLOW-UP CT SCAN IN 5-7 DAYS IF INDICATED. ORDERS WERE WRITTEN FOR ONCE DAILY IRRIGATION OF THE CATHETER WITH 5 ML STERILE SALINE FOR 3 DAYS.   3. THE ILIOPSOAS COLLECTION WAS NOT AMENABLE TO CATHETER DRAINAGE AS INTERVENING BOWEL AND/OR BONY STRUCTURES OBSTRUCT A PATHWAY TO THIS COLLECTION AT THIS TIME.      MR-LUMBAR SPINE-WITH & W/O   Final Result      1.  There are multifocal enhancing fluid collections in the right iliopsoas muscles and gluteus muscles adjacent to the right ilium. Right iliopsoas fluid collection measures an approximately 3.9 x 2.9 cm. The right gluteal fluid collection measures an    approximately 3.5 x 2.8 cm in size. The differential diagnosis includes postsurgical seroma and abscess.   2.  Post operative and degenerative changes as described above.      MR-PELVIS-WITH & W/O AND SEQUENCES   Final Result      1.  Surgical screw traverses both sacroiliac joint across presumed pathologic fracture of the right sacrum and the hardware obscures the adjacent tissues.      2.  No other evidence for bony metastatic disease      3.  Right gluteal musculature rim-enhancing fluid collection measuring 3.4 x 2.8 cm. This could be a postoperative seroma versus abscess      4.  osteoarthritis of both hip joint      5.  Prior hysterectomy      IR-MIDLINE CATHETER INSERTION WO GUIDANCE > AGE 3   Final Result                  Ultrasound-guided midline placement performed by qualified nursing staff    as above.          CT-HEAD WITH   Final Result      Multiple subcentimeter too numerous to count enhancing masses scattered throughout the supratentorial and infratentorial brain. These demonstrate some surrounding  vasogenic edema and most likely represent multifocal metastatic lesions. Other    considerations would include multifocal abscesses or septic emboli.      EC-CHAS W/O CONT   Final Result      EC-CHAS W/O CONT   Final Result      EC-ECHOCARDIOGRAM COMPLETE W/O CONT   Final Result      OUTSIDE IMAGES-CT CHEST/ABDOMEN/PELVIS   Final Result      OUTSIDE IMAGES-DX CHEST   Final Result      [id number] FILM X-RAY   Final Result      OUTSIDE IMAGES-MR BRAIN   Final Result      OUTSIDE IMAGES-MR BRAIN   Final Result           Assessment/Plan  * Lesion of brain   Assessment & Plan    Metastatic cancer vs infectious etiology  D/w Dr Black for questionable brain mets, recommending IR Bx of psoas muscle   IR Bx of psoas muscle ordered - was abscess  Empiric treatment of infection  TTE and CHAS negative for vegetations.  D/w Dr Nguyen - lesions likely metastatic based on his interpretation of MRI/CT - too small to biopsy, recommends continuing antibiotics and re-imaging in 2 weeks to see if there has been any change.  CT brain to be repeated - lesions same as prior 2 weeks ago.       Encephalopathy   Assessment & Plan    Waxes and wanes  Improved with dc of narcotics and using scheduled tyelnol for pain.         Mass of iliopsoas muscle group   Assessment & Plan    Return of fluid collection, repeat drainage in IR with cultures, drain placed 3/29 - 10ml purulent material drained and sent for culture - no growth a/o 3/30  Was on linezolid and cefepime and now on vanco/merrem - end date to be 6 weeks of treatment. (4/24/19)  ID consulting   Repeat CT abdomen shows resolution of fluid collection and ÁNGEL output dwindling - removed ÁNGEL drain         Normocytic anemia   Assessment & Plan    stable     Hyponatremia- (present on admission)   Assessment & Plan    Mildly low - doubt contribution to mentation         RLS (restless legs syndrome)- (present on admission)   Assessment & Plan    Pramipexole       Diabetes mellitus type 2 in  obese (HCC)- (present on admission)   Assessment & Plan    Well controlled   Short acting insulin as needed   Accu-Checks, hypoglycemia protocol          History of breast cancer- (present on admission)   Assessment & Plan    infection to brain  No known active breast cancer.       COPD (chronic obstructive pulmonary disease) (HCC)- (present on admission)   Assessment & Plan    Oxygen as needed, Respiratory protocol, Bronchodilators, Incentive spirometry      Urinary tract infection   Assessment & Plan    Culture remain no growth.       Fungal infection of the groin   Assessment & Plan    on nystatin powder 3/24     History of deep vein thrombosis- (present on admission)   Assessment & Plan    History of deep vein thrombosis   Was on Rivaroxaban as outpatient.     Held for Bx Mar 11  Restart AC after completing abx treatment         Essential hypertension- (present on admission)   Assessment & Plan    Restarted on losartan and furosemide with hold parameters.     GERD (gastroesophageal reflux disease)- (present on admission)   Assessment & Plan    Omeprazole     Chronic pain- (present on admission)   Assessment & Plan     Multifactorial  Likely secondary to abscess, fracture, bed bound status  Pain control            VTE prophylaxis: scds

## 2019-04-15 NOTE — PROGRESS NOTES
2 RN skin check completed.   Devices: Peripheral IV site. No areas of redness under tubing.   Blanchable redness to coccyx.   Heals are intact, good color. Elbows are intact with good color.   No other areas of redness noted.   No new pressure ulcers found.   Interventions in place: waffle mattress, [id number], pillows for extra support, floating heals.

## 2019-04-15 NOTE — PROGRESS NOTES
Pharmacy Kinetics 68 y.o. female on vancomycin day # 22 4/15/2019    Currently on Vancomycin 600 mg iv q24hr     Indication for Treatment: possible brain abscesses     Pertinent history per medical record: Admitted on 3/8/2019 for iliopsoas mass and multiple brain lesions.  There is concern that this is infectious.  All cultures are negative to date, but were all drawn while on antibiotics. CHAS was negative.  Patient has a history of breast cancer and DM.  ID is following.  Plan for completion of abx (6 weeks) in house prior to transfer to [facility] in [place].       Other antibiotics: NONE     Allergies: Patient has no known allergies.      List concerns for renal function: age, and prolonged vancomycin duration, elevated BUN/SCr ratio     Pertinent cultures to date:   3/11/19 wound - right gluteal mass: negative  3/12/19 blood - peripheral x 2: negative  3/29/19 wound - right buttock: negative  3/31/19 blood - peripheral x 2: negative   19 wound - retroperitoneal drain aspirate: negative     MRSA nares swab if pneumonia is a concern (ordered/positive/negative/n-a): n/a    Recent Labs      19   0029  19   0025  04/15/19   0007   WBC  3.6*  4.0*  5.5   NEUTSPOLYS  62.50  52.70  70.90     Recent Labs      19   0029  19   0024  04/15/19   0007   BUN    15   CREATININE  0.61  0.59  0.58     No results for input(s): VANCOTROUGH, VANCOPEAK, VANCORANDOM in the last 72 hours.No intake or output data in the 24 hours ending 04/15/19 1635     /63   Pulse 88   Temp 36.8 °C (98.3 °F) (Temporal)   Resp 16   Ht 1.524 m (5')   Wt 60.4 kg (133 lb 2.5 oz)   SpO2 92%  Temp (24hrs), Av.9 °C (98.4 °F), Min:36.7 °C (98.1 °F), Max:37.2 °C (98.9 °F)      A/P   1. Vancomycin dose change: NOT indicated at this time   2. Next vancomycin level: 2 days   3. Goal trough: 16-20 mcg/mL  4. Comments: afebrile, renal function seems stable, continue vanco thru 18 per ID.  Plan for vanco trough in  2 days and adjust dose if needed.    Gisselle Messina, PharmD, BCOP

## 2019-04-15 NOTE — DIETARY
Brief Nutrition Update:  Patient on day 38 of admit. Patient lethargic today.  Patient 0-75% with great variability.  Weight (4/13) 60.4 kg, down 4% in the past month, not significant but worth noting.     Patient is receiving Boost supplements and milkshakes both  TID.   Patient may benefit from nutrition support/nocturnal enteral feeds.  D/W MD; she reported she will mention it to patient's brother.  RD following.

## 2019-04-15 NOTE — PROGRESS NOTES
Infectious Disease Progress Note    Author: Beatriz Johnson M.D. Date & Time of service: 4/15/2019  11:59 AM    Chief Complaint:  Multiple brain lesions  Iliopsoas lesion    Interval History:  68-year-old female with history of diabetes and breast cancer, admitted 3/8/2019 with abdominal pain, iliopsoas lesion, and an abnormal MRI with multiple brain lesions.  4/1/2019 no fevers.  Easily arousable.  ÁNGEL continues to drain.  4/2/2019-no fevers.  Awake and responsive but feels weak.  There is minimal drainage from the ÁNGEL.  As per the nursing the patient is incontinent of stool.  But when you talk to her she states she is just is not able to get up on time.  4/3/2019-complains of some weakness in the hand. No fevers. The drain is not draining much  4/4- no fevers. She is anxious since she has been NPO. Awaiting IR drain  4/5 no fevers.  Got her drain.  There was purulent fluid.  WBCs 4.6.  4/8 afebrile, white count 3.3.  IR drain remains in place.  Continues to have significant word finding difficulty.  4/9 per RN, patient more awake and interactive but remains confused with word finding difficulty.  Resting comfortably this morning  4/10 afebrile, white count 4.6.  Patient much more awake and interactive today.  Reports no new issues, reports no pain anywhere.  IR drain has been removed.  4/11 afebrile WBC 4.2 patient complains of pain in her legs and feet.  She is being assisted with feeding.  Denies any headaches or neck pain  4/12/2019 no fevers.  Patient is alert and oriented.  Although really slow.  Apparently when she gets pain medications she becomes confused.  But this morning she is answering all questions appropriately.  4/13/2019 patient is somewhat confused.  No fevers.  No new issues overnight.  4/14/2019 no fevers.  Somewhat confused.  WBC is 4.  4/15/2019 no fevers.  Complains of back pain and joint pain.  We will BCs 5.5  Labs Reviewed    Review of Systems:  Review of Systems   Constitutional: Positive  for malaise/fatigue. Negative for fever.   HENT: Negative for hearing loss.    Respiratory: Negative for cough and shortness of breath.    Cardiovascular: Negative for chest pain and leg swelling.   Gastrointestinal: Negative for nausea and vomiting.   Genitourinary: Negative for dysuria.   Musculoskeletal: Positive for back pain and joint pain. Negative for myalgias.   Neurological: Negative for sensory change and speech change.   Limited review of systems secondary to intermittent confusion and word finding difficulty    Hemodynamics:  Temp (24hrs), Av.1 °C (98.8 °F), Min:36.7 °C (98.1 °F), Max:37.6 °C (99.7 °F)  Temperature: 36.7 °C (98.1 °F)  Pulse  Av.8  Min: 59  Max: 153  Blood Pressure : 130/72       Physical Exam:  Physical Exam   Constitutional: She is oriented to person, place, and time. She appears well-developed and well-nourished. No distress.   HENT:   Head: Normocephalic and atraumatic.   Eyes: Pupils are equal, round, and reactive to light. EOM are normal. Right eye exhibits no discharge. Left eye exhibits no discharge.   Neck: Neck supple.   Cardiovascular: Normal rate and intact distal pulses.    Murmur heard.  Pulmonary/Chest: Effort normal and breath sounds normal. No respiratory distress. She has no wheezes.   Abdominal: Soft. Bowel sounds are normal. She exhibits no distension. There is no tenderness.   Musculoskeletal: She exhibits no edema, tenderness or deformity.   Right upper extremity PICC line   Lymphadenopathy:     She has no cervical adenopathy.   Neurological: She is alert and oriented to person, place, and time.   Moves all extremities   Skin: Skin is warm. No rash noted. She is not diaphoretic.   Nursing note and vitals reviewed.    Meds:    Current Facility-Administered Medications:   •  QUEtiapine  •  acetaminophen  •  furosemide  •  haloperidol lactate  •  gabapentin  •  ibuprofen  •  labetalol  •  loperamide  •  losartan  •  MD Alert...Vancomycin per Pharmacy  •   Notify provider if pain remains uncontrolled **AND** Use the numeric rating scale (NRS-11) on regular floors and Critical-Care Pain Observation Tool (CPOT) on ICUs/Trauma to assess pain **AND** Pulse Ox (Oximetry) **AND** Pharmacy Consult Request **AND** If patient difficult to arouse and/or has respiratory depression, stop any opiates that are currently infusing and call a Rapid Response. **AND** [DISCONTINUED] oxyCODONE immediate-release **AND** [DISCONTINUED] oxyCODONE immediate-release **AND** [DISCONTINUED] morphine injection  •  omeprazole  •  ondansetron  •  ondansetron  •  potassium chloride SA  •  pramipexole  •  Respiratory Care per Protocol  •  vancomycin    Labs:  Recent Labs      04/13/19   0029  04/14/19   0025  04/15/19   0007   WBC  3.6*  4.0*  5.5   RBC  4.75  4.70  5.06   HEMOGLOBIN  11.9*  11.4*  12.4   HEMATOCRIT  37.9  38.1  40.8   MCV  79.8*  81.1*  80.6*   MCH  25.1*  24.3*  24.5*   RDW  48.8  50.3*  51.1*   PLATELETCT  119*  126*  143*   MPV  10.8  10.4  11.1   NEUTSPOLYS  62.50  52.70  70.90   LYMPHOCYTES  14.70*  23.70  15.70*   MONOCYTES  14.40*  14.20*  10.30   EOSINOPHILS  7.20*  8.20*  2.20   BASOPHILS  0.60  1.00  0.50     Recent Labs      04/13/19   0029  04/14/19   0024  04/15/19   0007   SODIUM  135  135  134*   POTASSIUM  4.3  5.3  5.0   CHLORIDE  102  105  106   CO2  27  25  22   GLUCOSE  103*  102*  88   BUN  19  22  15     Recent Labs      04/13/19   0029  04/14/19   0024  04/15/19   0007   CREATININE  0.61  0.59  0.58       Imaging:  Ct-needle Bx-muscle Right    Result Date: 3/13/2019  3/11/2019 4:44 PM HISTORY/REASON FOR EXAM:  Right gluteal hypodense lesion. Moderate sedation was administered with continuous monitoring of the patient under the direct supervision of Dr.Rangaswamy Medications: 2 mg of Versed and 200 mcg of fentanyl Total sedation time 60 minutes Procedure: After the informed consent the patient was placed on the CT table on prone position. Localization CT scan  was obtained. It demonstrates hypodense area in the right gluteus muscle. Subsequently a 17-gauge needle was advanced into the lesion. 20  mL of pus was aspirated. The materials were sent to the pathology. The patient tolerated the procedure without any immediate competition.     CT-guided aspiration of pus like material in the right gluteal muscle lesion.    Ec-echocardiogram Complete W/o Cont    Result Date: 3/13/2019  Transthoracic Echo Report Echocardiography Laboratory CONCLUSIONS No prior study is available for comparison. Normal left ventricular systolic function. Left ventricular ejection fraction is visually estimated to be 65%. Normal diastolic function. Normal inferior vena cava size and inspiratory collapse. Aortic sclerosis without stenosis. Estimated right ventricular systolic pressure  is 33 mmHg. No obvious valvular vegetations are noted on a decent quality study.  If further valvular assessment is clinically indicated, consider transesophageal echocardiogram. SAM,  LV EF:  65    % FINDINGS Left Ventricle Normal left ventricular size and systolic function. Normal left ventricular wall thickness. Left ventricular ejection fraction is visually estimated to be 65%. Normal diastolic function. Right Ventricle Normal right ventricular size and systolic function. Right Atrium Normal right atrial size. Normal inferior vena cava size and inspiratory collapse. Left Atrium Normal left atrial size. Mitral Valve Structurally normal mitral valve without significant stenosis or regurgitation. Aortic Valve Aortic sclerosis without stenosis. No aortic insufficiency. Tricuspid Valve Structurally normal tricuspid valve. Mild tricuspid regurgitation. Right atrial pressure is estimated to be 3 mmHg. Estimated right ventricular systolic pressure  is 33 mmHg. Pulmonic Valve The pulmonic valve is not well visualized. No pulmonic insufficiency. Pericardium Normal pericardium without effusion. Aorta The aortic root is  normal.  Ascending aorta diameter is 3.0 cm. Claire Tripathi MD (Electronically Signed) Final Date:     13 March 2019                 14:38      Micro:  Results     Procedure Component Value Units Date/Time    URINALYSIS [[id number]]  (Abnormal) Collected:  04/12/19 1148    Order Status:  Completed Specimen:  Urine from Urine, Cath Updated:  04/12/19 1246     Color Yellow     Character Clear     Specific Gravity 1.011     Ph 7.0     Glucose Negative mg/dL      Ketones Negative mg/dL      Protein 30 (A) mg/dL      Bilirubin Negative     Urobilinogen, Urine 0.2     Nitrite Negative     Leukocyte Esterase Trace (A)     Occult Blood Negative     Micro Urine Req Microscopic    Narrative:       Collected By:[id number] [employer] MOHINDER URIBE          Assessment:  Active Hospital Problems    Diagnosis   • *Lesion of brain [G93.9]   • Mass of iliopsoas muscle group [M62.89]   • Encephalopathy [G93.40]   •    • History of breast cancer [Z85.3]   • Diabetes mellitus type 2 in obese (HCC) [E11.69, E66.9]       Plan:  Multiple brain lesions-likely abscesses versus metastatic lesions  Leukopenia  Confusion improving  MRI with scattered small lesions incl aaron, too small to biopsy per neurosurgery  TTE neg; CHAS neg  Bcxs neg to date  Repeat CT scan on 3/30/2019 shows decrease in the size of the brain lesions, supporting that these are abscesses  Repeat MRI brain prior to completion of abx    Iliopsoas abscess, on treatment  CT + 2.6 cm lesion in the right iliopsoas region  S/p aspiration +WBCs-cultures negative to date  Bcxs remain neg  Last vanc trough of 20.4 and 4/5  D/c cefepime 03/26 - may be contributing to confusion  Tolerating Meropenem started 03/26  MRI w/ contrast lumbar, pelvis 03/27 - Iliopsoas and gluteal fluid collections noted.    Drain placed 3/29/2019  Cultures are negative so far  Repeat CT scan shows resolution of the abscess in the gluteus but increase in the iliopsoas  Another drain was placed on 4/4/2019 and it shows  purulent fluid  Repeat CT from 4/8 with resolution of the fluid collections after drainage per my read  Aim  for at least 6 weeks of IV antibiotics (end date: 4/24/2018) likely followed by p.o. suppression given that the infection is adjacent to hardware  Consider repeat CHAS at some point given her multiple brain lesions suspicious for infective endocarditis  Continue broad-spectrum IV vancomycin and meropenem for now.  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  I spoke to orthopedics about logistics of taking the hardware out.  Await orthopedic follow-up  H/o breast cancer  Markedly elevated alk phos, trending down  Continued diaz for mets    Type 2 diabetes mellitus  Hemoglobin A1c 6.3  Keep BS under 150 to help control current infection    Altered mental status  Appears to be waxing and waning  Multifactorial, likely large contribution from her multiple brain abscesses, monitor  The CT scan done on 4/13/2019 shows persistence of the lesions    I have performed a physical exam and reviewed and updated ROS and plan today 4/15/2019.  In review of yesterday's note 4/14/2019, there are no changes except as documented above.    Continue current plan as outlined above    Discussed with IM Dr. Keller

## 2019-04-16 ENCOUNTER — APPOINTMENT (OUTPATIENT)
Dept: RADIOLOGY | Facility: MEDICAL CENTER | Age: 69
DRG: 981 | End: 2019-04-16
Attending: HOSPITALIST
Payer: MEDICARE

## 2019-04-16 PROBLEM — R00.0 SINUS TACHYCARDIA: Status: ACTIVE | Noted: 2019-04-16

## 2019-04-16 LAB
ALBUMIN SERPL BCP-MCNC: 3.2 G/DL (ref 3.2–4.9)
ALBUMIN/GLOB SERPL: 0.7 G/DL
ALP SERPL-CCNC: 263 U/L (ref 30–99)
ALT SERPL-CCNC: 19 U/L (ref 2–50)
ANION GAP SERPL CALC-SCNC: 10 MMOL/L (ref 0–11.9)
AST SERPL-CCNC: 36 U/L (ref 12–45)
BASE EXCESS BLDA CALC-SCNC: -1 MMOL/L (ref -4–3)
BASOPHILS # BLD AUTO: 1.1 % (ref 0–1.8)
BASOPHILS # BLD: 0.05 K/UL (ref 0–0.12)
BILIRUB SERPL-MCNC: 0.8 MG/DL (ref 0.1–1.5)
BODY TEMPERATURE: ABNORMAL CENTIGRADE
BUN SERPL-MCNC: 15 MG/DL (ref 8–22)
CALCIUM SERPL-MCNC: 10.9 MG/DL (ref 8.5–10.5)
CHLORIDE SERPL-SCNC: 103 MMOL/L (ref 96–112)
CO2 SERPL-SCNC: 22 MMOL/L (ref 20–33)
CREAT SERPL-MCNC: 0.57 MG/DL (ref 0.5–1.4)
EKG IMPRESSION: NORMAL
EOSINOPHIL # BLD AUTO: 0.1 K/UL (ref 0–0.51)
EOSINOPHIL NFR BLD: 2.1 % (ref 0–6.9)
ERYTHROCYTE [DISTWIDTH] IN BLOOD BY AUTOMATED COUNT: 49.8 FL (ref 35.9–50)
GLOBULIN SER CALC-MCNC: 4.5 G/DL (ref 1.9–3.5)
GLUCOSE SERPL-MCNC: 105 MG/DL (ref 65–99)
HCO3 BLDA-SCNC: 20 MMOL/L (ref 17–25)
HCT VFR BLD AUTO: 40.2 % (ref 37–47)
HGB BLD-MCNC: 12.7 G/DL (ref 12–16)
IMM GRANULOCYTES # BLD AUTO: 0.02 K/UL (ref 0–0.11)
IMM GRANULOCYTES NFR BLD AUTO: 0.4 % (ref 0–0.9)
INHALED O2 FLOW RATE: 2 L/MIN (ref 2–10)
LACTATE BLD-SCNC: 1.4 MMOL/L (ref 0.5–2)
LYMPHOCYTES # BLD AUTO: 0.82 K/UL (ref 1–4.8)
LYMPHOCYTES NFR BLD: 17.3 % (ref 22–41)
MCH RBC QN AUTO: 25 PG (ref 27–33)
MCHC RBC AUTO-ENTMCNC: 31.6 G/DL (ref 33.6–35)
MCV RBC AUTO: 79 FL (ref 81.4–97.8)
MONOCYTES # BLD AUTO: 0.55 K/UL (ref 0–0.85)
MONOCYTES NFR BLD AUTO: 11.6 % (ref 0–13.4)
NEUTROPHILS # BLD AUTO: 3.2 K/UL (ref 2–7.15)
NEUTROPHILS NFR BLD: 67.5 % (ref 44–72)
NRBC # BLD AUTO: 0 K/UL
NRBC BLD-RTO: 0 /100 WBC
PCO2 BLDA: 22.9 MMHG (ref 26–37)
PH BLDA: 7.56 [PH] (ref 7.4–7.5)
PLATELET # BLD AUTO: 131 K/UL (ref 164–446)
PMV BLD AUTO: 10.6 FL (ref 9–12.9)
PO2 BLDA: 81.7 MMHG (ref 64–87)
POTASSIUM SERPL-SCNC: 4.1 MMOL/L (ref 3.6–5.5)
PROT SERPL-MCNC: 7.7 G/DL (ref 6–8.2)
RBC # BLD AUTO: 5.09 M/UL (ref 4.2–5.4)
SAO2 % BLDA: 96.1 % (ref 93–99)
SODIUM SERPL-SCNC: 135 MMOL/L (ref 135–145)
WBC # BLD AUTO: 4.7 K/UL (ref 4.8–10.8)

## 2019-04-16 PROCEDURE — 85025 COMPLETE CBC W/AUTO DIFF WBC: CPT

## 2019-04-16 PROCEDURE — 700105 HCHG RX REV CODE 258: Performed by: INTERNAL MEDICINE

## 2019-04-16 PROCEDURE — 83605 ASSAY OF LACTIC ACID: CPT

## 2019-04-16 PROCEDURE — 93010 ELECTROCARDIOGRAM REPORT: CPT | Performed by: INTERNAL MEDICINE

## 2019-04-16 PROCEDURE — 71045 X-RAY EXAM CHEST 1 VIEW: CPT

## 2019-04-16 PROCEDURE — 700117 HCHG RX CONTRAST REV CODE 255: Performed by: HOSPITALIST

## 2019-04-16 PROCEDURE — 770020 HCHG ROOM/CARE - TELE (206)

## 2019-04-16 PROCEDURE — 36415 COLL VENOUS BLD VENIPUNCTURE: CPT

## 2019-04-16 PROCEDURE — 93005 ELECTROCARDIOGRAM TRACING: CPT | Performed by: INTERNAL MEDICINE

## 2019-04-16 PROCEDURE — 700102 HCHG RX REV CODE 250 W/ 637 OVERRIDE(OP): Performed by: INTERNAL MEDICINE

## 2019-04-16 PROCEDURE — 700111 HCHG RX REV CODE 636 W/ 250 OVERRIDE (IP): Performed by: INTERNAL MEDICINE

## 2019-04-16 PROCEDURE — 700111 HCHG RX REV CODE 636 W/ 250 OVERRIDE (IP): Performed by: HOSPITALIST

## 2019-04-16 PROCEDURE — A9270 NON-COVERED ITEM OR SERVICE: HCPCS | Performed by: INTERNAL MEDICINE

## 2019-04-16 PROCEDURE — 99233 SBSQ HOSP IP/OBS HIGH 50: CPT | Performed by: INTERNAL MEDICINE

## 2019-04-16 PROCEDURE — 99233 SBSQ HOSP IP/OBS HIGH 50: CPT | Performed by: FAMILY MEDICINE

## 2019-04-16 PROCEDURE — 80053 COMPREHEN METABOLIC PANEL: CPT

## 2019-04-16 PROCEDURE — 71275 CT ANGIOGRAPHY CHEST: CPT

## 2019-04-16 PROCEDURE — 82803 BLOOD GASES ANY COMBINATION: CPT

## 2019-04-16 RX ORDER — KETOROLAC TROMETHAMINE 30 MG/ML
15 INJECTION, SOLUTION INTRAMUSCULAR; INTRAVENOUS EVERY 6 HOURS PRN
Status: DISPENSED | OUTPATIENT
Start: 2019-04-16 | End: 2019-04-21

## 2019-04-16 RX ORDER — HYDRALAZINE HYDROCHLORIDE 20 MG/ML
10 INJECTION INTRAMUSCULAR; INTRAVENOUS EVERY 6 HOURS PRN
Status: DISCONTINUED | OUTPATIENT
Start: 2019-04-16 | End: 2019-04-21

## 2019-04-16 RX ADMIN — GABAPENTIN 300 MG: 300 CAPSULE ORAL at 18:40

## 2019-04-16 RX ADMIN — LOSARTAN POTASSIUM 50 MG: 50 TABLET ORAL at 04:30

## 2019-04-16 RX ADMIN — FUROSEMIDE 20 MG: 20 TABLET ORAL at 04:30

## 2019-04-16 RX ADMIN — POTASSIUM CHLORIDE 40 MEQ: 1500 TABLET, EXTENDED RELEASE ORAL at 04:31

## 2019-04-16 RX ADMIN — MEROPENEM 2 G: 1 INJECTION, POWDER, FOR SOLUTION INTRAVENOUS at 23:36

## 2019-04-16 RX ADMIN — GABAPENTIN 300 MG: 300 CAPSULE ORAL at 04:30

## 2019-04-16 RX ADMIN — ACETAMINOPHEN 650 MG: 325 TABLET, FILM COATED ORAL at 18:40

## 2019-04-16 RX ADMIN — ACETAMINOPHEN 650 MG: 325 TABLET, FILM COATED ORAL at 04:30

## 2019-04-16 RX ADMIN — POTASSIUM CHLORIDE 40 MEQ: 1500 TABLET, EXTENDED RELEASE ORAL at 18:40

## 2019-04-16 RX ADMIN — QUETIAPINE FUMARATE 25 MG: 25 TABLET ORAL at 18:40

## 2019-04-16 RX ADMIN — IOHEXOL 55 ML: 350 INJECTION, SOLUTION INTRAVENOUS at 06:45

## 2019-04-16 RX ADMIN — HYDRALAZINE HYDROCHLORIDE 10 MG: 20 INJECTION INTRAMUSCULAR; INTRAVENOUS at 05:16

## 2019-04-16 RX ADMIN — ACETAMINOPHEN 650 MG: 325 TABLET, FILM COATED ORAL at 12:32

## 2019-04-16 RX ADMIN — VANCOMYCIN HYDROCHLORIDE 600 MG: 100 INJECTION, POWDER, LYOPHILIZED, FOR SOLUTION INTRAVENOUS at 21:51

## 2019-04-16 RX ADMIN — OMEPRAZOLE 20 MG: 20 CAPSULE, DELAYED RELEASE ORAL at 04:30

## 2019-04-16 NOTE — PROGRESS NOTES
Assumed care of patient, bedside report received from nightshift RN. Updated on POC, call light within reach and fall precautions in place. Bed locked and in lowest position. Patient instructed to call for assistance before getting out of bed. All questions answered, no other needs at this time.

## 2019-04-16 NOTE — DISCHARGE PLANNING
Received phone call from patient's brother Arsh he is concerned and frustrated about his sister's condition. Discussed current treatment and possible discharge plans. He stated that he wished he could get his sister transferred to [place] still to be closer to family. Discussed possible LTAC transfer if patient is to remain on IV antibiotics. SNF referrals have been placed. PT is not medically clear at this time. Provided brother LSW extension on T[facility] where patient was transferred this morning. He stated he will be in [place] on Thursday through Sunday.

## 2019-04-16 NOTE — PROGRESS NOTES
Pharmacy Kinetics 68 y.o. female on vancomycin day # 23 2019    Currently on Vancomycin 600 mg iv q24hr   ()    Indication for Treatment: possible brain abscesses     Pertinent history per medical record: Admitted on 3/8/2019 for iliopsoas mass and multiple brain lesions. There is concern that this is infectious. All cultures are negative to date, but were all drawn while on antibiotics. CHAS was negative. Patient has a history of breast cancer and DM. ID is following. Plan for completion of abx (6 weeks) in house prior to transfer to [facility] in [place].     Other antibiotics: NONE     Allergies: Patient has no known allergies.     List concerns for renal function: age, and prolonged vancomycin duration, elevated BUN/SCr ratio     Pertinent cultures to date:   3/11/19 wound - right gluteal mass: negative   3/12/19 blood - peripheral x 2: negative   3/29/19 wound - right buttock: negative   3/31/19 blood - peripheral x 2: negative   19 wound - retroperitoneal drain aspirate: negative     MRSA nares swab if pneumonia is a concern (ordered/positive/negative/n-a): n/a      Recent Labs      19   0025  04/15/19   0007  19   0531   WBC  4.0*  5.5  4.7*   NEUTSPOLYS  52.70  70.90  67.50     Recent Labs      19   0024  04/15/19   0007  19   0531   BUN  22  15  15   CREATININE  0.59  0.58  0.57   ALBUMIN   --    --   3.2     No results for input(s): VANCOTROUGH, VANCOPEAK, VANCORANDOM in the last 72 hours.No intake or output data in the 24 hours ending 19 1132   /58   Pulse (!) 125   Temp 37.4 °C (99.3 °F) (Temporal)   Resp (!) 28   Ht 1.524 m (5')   Wt 59.7 kg (131 lb 9.8 oz)   SpO2 98%  Temp (24hrs), Av.2 °C (99 °F), Min:36.5 °C (97.7 °F), Max:37.8 °C (100.1 °F)      A/P   1. Vancomycin dose change: none  2. Next vancomycin level: tomorrow, 18, at 2130  (ordered)  3. Goal trough: 16-20 mcg/mL  4. Comments: No leukocytosis and remains afebrile; some tachycardia  noted. Renal function appears stable per renal indices; no I&O's to assess UOP. Continue current dose. Trough level ordered for tomorrow to assess accumulation and dose appropriateness. Pharmacy to continue to follow.     Michelle Jefferson, PharmD., BCPS

## 2019-04-16 NOTE — PROGRESS NOTES
Patient orientation fluctuates between 1-3, waxes and wanes. Sometimes patient is appropriate and will answer questions, other times she moans and does not respond to questions. Incontinent of bladder and bowel. Turn q2.   Around 0430 the tech and I went into Mrs Watkins's room to change and turn her, I also gave her morning medications. After she was turned and changed, her vitals were abnormal ( /111, HR 130s, RR 24-30). The MD was paged and an order for IV hydralazine 10mg was received. Upon reassessment, 15 minutes after medication was given, Patient remained hypertensive in the 170s, HR was in the 130s-140s, her RR was in the 30s, and her work of breathing was increased. At this point, a rapid response was called. EKG, Labs, CXR, chest CT with contrast all performed. Patient's brother informed of pending situation, consent received for contrast with Charge PATRICK Hubbard. Report to be called.

## 2019-04-16 NOTE — PROGRESS NOTES
Infectious Disease Progress Note    Author: Beatriz Johnson M.D. Date & Time of service: 4/16/2019  12:29 PM    Chief Complaint:  Multiple brain lesions  Iliopsoas lesion    Interval History:  68-year-old female with history of diabetes and breast cancer, admitted 3/8/2019 with abdominal pain, iliopsoas lesion, and an abnormal MRI with multiple brain lesions.  4/1/2019 no fevers.  Easily arousable.  ÁNGEL continues to drain.  4/2/2019-no fevers.  Awake and responsive but feels weak.  There is minimal drainage from the ÁNGEL.  As per the nursing the patient is incontinent of stool.  But when you talk to her she states she is just is not able to get up on time.  4/3/2019-complains of some weakness in the hand. No fevers. The drain is not draining much  4/4- no fevers. She is anxious since she has been NPO. Awaiting IR drain  4/5 no fevers.  Got her drain.  There was purulent fluid.  WBCs 4.6.  4/8 afebrile, white count 3.3.  IR drain remains in place.  Continues to have significant word finding difficulty.  4/9 per RN, patient more awake and interactive but remains confused with word finding difficulty.  Resting comfortably this morning  4/10 afebrile, white count 4.6.  Patient much more awake and interactive today.  Reports no new issues, reports no pain anywhere.  IR drain has been removed.  4/11 afebrile WBC 4.2 patient complains of pain in her legs and feet.  She is being assisted with feeding.  Denies any headaches or neck pain  4/12/2019 no fevers.  Patient is alert and oriented.  Although really slow.  Apparently when she gets pain medications she becomes confused.  But this morning she is answering all questions appropriately.  4/13/2019 patient is somewhat confused.  No fevers.  No new issues overnight.  4/14/2019 no fevers.  Somewhat confused.  WBC is 4.  4/15/2019 no fevers.  Complains of back pain and joint pain.  WBCs 5.5  4/16/2019 patient was transferred to telemetry overnight.  Much more lethargic.  Labs  Reviewed    Review of Systems:  Review of Systems   Unable to perform ROS: Mental status change       Hemodynamics:  Temp (24hrs), Av.2 °C (99 °F), Min:36.5 °C (97.7 °F), Max:37.8 °C (100.1 °F)  Temperature: 37.4 °C (99.3 °F)  Pulse  Av.8  Min: 59  Max: 153  Blood Pressure : 114/58       Physical Exam:  Physical Exam   Constitutional: She is oriented to person, place, and time. She appears well-developed and well-nourished. No distress.   HENT:   Head: Normocephalic and atraumatic.   Eyes: Pupils are equal, round, and reactive to light. EOM are normal. Right eye exhibits no discharge. Left eye exhibits no discharge.   Neck: Neck supple.   Cardiovascular: Normal rate and intact distal pulses.    Murmur heard.  Pulmonary/Chest: Effort normal and breath sounds normal. No respiratory distress. She has no wheezes.   Abdominal: Soft. Bowel sounds are normal. She exhibits no distension. There is no tenderness.   Musculoskeletal: She exhibits no edema, tenderness or deformity.   Right upper extremity PICC line   Lymphadenopathy:     She has no cervical adenopathy.   Neurological: She is alert and oriented to person, place, and time.   Moves all extremities   Skin: Skin is warm. No rash noted. She is not diaphoretic.   Nursing note and vitals reviewed.    Meds:    Current Facility-Administered Medications:   •  hydrALAZINE  •  haloperidol lactate  •  QUEtiapine  •  acetaminophen  •  furosemide  •  gabapentin  •  ibuprofen  •  labetalol  •  loperamide  •  losartan  •  MD Alert...Vancomycin per Pharmacy  •  Notify provider if pain remains uncontrolled **AND** Use the numeric rating scale (NRS-11) on regular floors and Critical-Care Pain Observation Tool (CPOT) on ICUs/Trauma to assess pain **AND** Pulse Ox (Oximetry) **AND** Pharmacy Consult Request **AND** If patient difficult to arouse and/or has respiratory depression, stop any opiates that are currently infusing and call a Rapid Response. **AND** [DISCONTINUED]  oxyCODONE immediate-release **AND** [DISCONTINUED] oxyCODONE immediate-release **AND** [DISCONTINUED] morphine injection  •  omeprazole  •  ondansetron  •  ondansetron  •  potassium chloride SA  •  pramipexole  •  Respiratory Care per Protocol  •  vancomycin    Labs:  Recent Labs      04/14/19   0025  04/15/19   0007  04/16/19   0531   WBC  4.0*  5.5  4.7*   RBC  4.70  5.06  5.09   HEMOGLOBIN  11.4*  12.4  12.7   HEMATOCRIT  38.1  40.8  40.2   MCV  81.1*  80.6*  79.0*   MCH  24.3*  24.5*  25.0*   RDW  50.3*  51.1*  49.8   PLATELETCT  126*  143*  131*   MPV  10.4  11.1  10.6   NEUTSPOLYS  52.70  70.90  67.50   LYMPHOCYTES  23.70  15.70*  17.30*   MONOCYTES  14.20*  10.30  11.60   EOSINOPHILS  8.20*  2.20  2.10   BASOPHILS  1.00  0.50  1.10     Recent Labs      04/14/19   0024  04/15/19   0007  04/16/19   0531   SODIUM  135  134*  135   POTASSIUM  5.3  5.0  4.1   CHLORIDE  105  106  103   CO2  25  22  22   GLUCOSE  102*  88  105*   BUN  22  15  15     Recent Labs      04/14/19   0024  04/15/19   0007  04/16/19   0531   ALBUMIN   --    --   3.2   TBILIRUBIN   --    --   0.8   ALKPHOSPHAT   --    --   263*   TOTPROTEIN   --    --   7.7   ALTSGPT   --    --   19   ASTSGOT   --    --   36   CREATININE  0.59  0.58  0.57       Imaging:  Ct-needle Bx-muscle Right    Result Date: 3/13/2019  3/11/2019 4:44 PM HISTORY/REASON FOR EXAM:  Right gluteal hypodense lesion. Moderate sedation was administered with continuous monitoring of the patient under the direct supervision of  Medications: 2 mg of Versed and 200 mcg of fentanyl Total sedation time 60 minutes Procedure: After the informed consent the patient was placed on the CT table on prone position. Localization CT scan was obtained. It demonstrates hypodense area in the right gluteus muscle. Subsequently a 17-gauge needle was advanced into the lesion. 20  mL of pus was aspirated. The materials were sent to the pathology. The patient tolerated the procedure  without any immediate competition.     CT-guided aspiration of pus like material in the right gluteal muscle lesion.    Ec-echocardiogram Complete W/o Cont    Result Date: 3/13/2019  Transthoracic Echo Report Echocardiography Laboratory CONCLUSIONS No prior study is available for comparison. Normal left ventricular systolic function. Left ventricular ejection fraction is visually estimated to be 65%. Normal diastolic function. Normal inferior vena cava size and inspiratory collapse. Aortic sclerosis without stenosis. Estimated right ventricular systolic pressure  is 33 mmHg. No obvious valvular vegetations are noted on a decent quality study.  If further valvular assessment is clinically indicated, consider transesophageal echocardiogram. SAM,  LV EF:  65    % FINDINGS Left Ventricle Normal left ventricular size and systolic function. Normal left ventricular wall thickness. Left ventricular ejection fraction is visually estimated to be 65%. Normal diastolic function. Right Ventricle Normal right ventricular size and systolic function. Right Atrium Normal right atrial size. Normal inferior vena cava size and inspiratory collapse. Left Atrium Normal left atrial size. Mitral Valve Structurally normal mitral valve without significant stenosis or regurgitation. Aortic Valve Aortic sclerosis without stenosis. No aortic insufficiency. Tricuspid Valve Structurally normal tricuspid valve. Mild tricuspid regurgitation. Right atrial pressure is estimated to be 3 mmHg. Estimated right ventricular systolic pressure  is 33 mmHg. Pulmonic Valve The pulmonic valve is not well visualized. No pulmonic insufficiency. Pericardium Normal pericardium without effusion. Aorta The aortic root is normal.  Ascending aorta diameter is 3.0 cm. Claire Tripathi MD (Electronically Signed) Final Date:     13 March 2019                 14:38      Micro:  Results     Procedure Component Value Units Date/Time    URINALYSIS [[id number]]  (Abnormal)  Collected:  04/12/19 1148    Order Status:  Completed Specimen:  Urine from Urine, Cath Updated:  04/12/19 1246     Color Yellow     Character Clear     Specific Gravity 1.011     Ph 7.0     Glucose Negative mg/dL      Ketones Negative mg/dL      Protein 30 (A) mg/dL      Bilirubin Negative     Urobilinogen, Urine 0.2     Nitrite Negative     Leukocyte Esterase Trace (A)     Occult Blood Negative     Micro Urine Req Microscopic    Narrative:       Collected By:[id number] REJIFUNMILAYO URIBE          Assessment:  Active Hospital Problems    Diagnosis   • *Lesion of brain [G93.9]   • Mass of iliopsoas muscle group [M62.89]   • Encephalopathy [G93.40]   •    • History of breast cancer [Z85.3]   • Diabetes mellitus type 2 in obese (HCC) [E11.69, E66.9]       Plan:  Multiple brain lesions-likely abscesses versus metastatic lesions  Leukopenia  Confusion improving  MRI with scattered small lesions incl aaron, too small to biopsy per neurosurgery  TTE neg; CHAS neg  Bcxs neg to date  Repeat CT scan on 3/30/2019 shows decrease in the size of the brain lesions, supporting that these are abscesses  Repeat MRI brain prior to completion of abx    Iliopsoas abscess, on treatment  CT + 2.6 cm lesion in the right iliopsoas region  S/p aspiration +WBCs-cultures negative to date  Bcxs remain neg  Last vanc trough of 20.4 and 4/5  D/c cefepime 03/26 - may be contributing to confusion  Tolerating Meropenem started 03/26  MRI w/ contrast lumbar, pelvis 03/27 - Iliopsoas and gluteal fluid collections noted.    Drain placed 3/29/2019  Cultures are negative so far  Repeat CT scan shows resolution of the abscess in the gluteus but increase in the iliopsoas  Another drain was placed on 4/4/2019 and it shows purulent fluid  Repeat CT from 4/8 with resolution of the fluid collections after drainage per my read  Aim  for at least 6 weeks of IV antibiotics (end date: 4/24/2018) likely followed by p.o. suppression given that the infection is adjacent to  hardware  Consider repeat CHAS at some point given her multiple brain lesions suspicious for infective endocarditis  Continue broad-spectrum IV vancomycin and meropenem for now.  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  I spoke to orthopedics about logistics of taking the hardware out.  As per orthopedics this hardware needs to be in for 6 months.  H/o breast cancer  Markedly elevated alk phos, trending down  Continued diaz for mets    Type 2 diabetes mellitus  Hemoglobin A1c 6.3  Keep BS under 150 to help control current infection    Altered mental status  Appears to be waxing and waning  Multifactorial, likely large contribution from her multiple brain abscesses, monitor  The CT scan done on 4/13/2019 shows persistence of the lesions    I have performed a physical exam and reviewed and updated ROS and plan today 4/16/2019.  In review of yesterday's note 4/15/2019, there are no changes except as documented above.    Continue current plan as outlined above    Discussed with IM

## 2019-04-16 NOTE — PROGRESS NOTES
Screws placed 4 months ago  Ideally would like to leave pelvic hardware in place for 6 months to prevent nonunion  If infection can be surpressed with IV ABX would be othropedically safer

## 2019-04-16 NOTE — PROGRESS NOTES
· 2 RN skin check complete with PATRICK Jacob.  · Devices in place SCDs, midline.  · Skin assessed under devices intact.  · Confirmed pressure ulcers found on N/A.  · New potential pressure ulcers noted on N/A. Wound consult placed and wound reported.  · The following interventions in place waffle mattress in place, frequent linen changes, Q2 turns in place    Bilateral ears pink and blanching  Bilateral elbows intact, red, blanching, floated on pillows  Sacrum pink, intact, blanching  Red, skin tear to left buttock, blanchable.   Bilateral heels boggy, pink, and blanching

## 2019-04-16 NOTE — DISCHARGE PLANNING
PT transferred this am to T8 remains this afternoon. Phoned report to BRIANA Rogers regarding discharge planning.

## 2019-04-17 LAB
ALBUMIN SERPL BCP-MCNC: 2.7 G/DL (ref 3.2–4.9)
ALBUMIN/GLOB SERPL: 0.6 G/DL
ALP SERPL-CCNC: 198 U/L (ref 30–99)
ALT SERPL-CCNC: 14 U/L (ref 2–50)
ANION GAP SERPL CALC-SCNC: 7 MMOL/L (ref 0–11.9)
AST SERPL-CCNC: 28 U/L (ref 12–45)
BASOPHILS # BLD AUTO: 1.2 % (ref 0–1.8)
BASOPHILS # BLD: 0.05 K/UL (ref 0–0.12)
BILIRUB SERPL-MCNC: 0.6 MG/DL (ref 0.1–1.5)
BUN SERPL-MCNC: 18 MG/DL (ref 8–22)
CALCIUM SERPL-MCNC: 10.6 MG/DL (ref 8.5–10.5)
CHLORIDE SERPL-SCNC: 107 MMOL/L (ref 96–112)
CO2 SERPL-SCNC: 23 MMOL/L (ref 20–33)
CREAT SERPL-MCNC: 0.64 MG/DL (ref 0.5–1.4)
EOSINOPHIL # BLD AUTO: 0.25 K/UL (ref 0–0.51)
EOSINOPHIL NFR BLD: 5.9 % (ref 0–6.9)
ERYTHROCYTE [DISTWIDTH] IN BLOOD BY AUTOMATED COUNT: 51.9 FL (ref 35.9–50)
GLOBULIN SER CALC-MCNC: 4.2 G/DL (ref 1.9–3.5)
GLUCOSE SERPL-MCNC: 91 MG/DL (ref 65–99)
HCT VFR BLD AUTO: 38.3 % (ref 37–47)
HGB BLD-MCNC: 11.8 G/DL (ref 12–16)
IMM GRANULOCYTES # BLD AUTO: 0.01 K/UL (ref 0–0.11)
IMM GRANULOCYTES NFR BLD AUTO: 0.2 % (ref 0–0.9)
LYMPHOCYTES # BLD AUTO: 0.86 K/UL (ref 1–4.8)
LYMPHOCYTES NFR BLD: 20.1 % (ref 22–41)
MCH RBC QN AUTO: 24.8 PG (ref 27–33)
MCHC RBC AUTO-ENTMCNC: 30.8 G/DL (ref 33.6–35)
MCV RBC AUTO: 80.5 FL (ref 81.4–97.8)
MONOCYTES # BLD AUTO: 0.7 K/UL (ref 0–0.85)
MONOCYTES NFR BLD AUTO: 16.4 % (ref 0–13.4)
NEUTROPHILS # BLD AUTO: 2.4 K/UL (ref 2–7.15)
NEUTROPHILS NFR BLD: 56.2 % (ref 44–72)
NRBC # BLD AUTO: 0 K/UL
NRBC BLD-RTO: 0 /100 WBC
PLATELET # BLD AUTO: 127 K/UL (ref 164–446)
PMV BLD AUTO: 11.1 FL (ref 9–12.9)
POTASSIUM SERPL-SCNC: 4.4 MMOL/L (ref 3.6–5.5)
PROT SERPL-MCNC: 6.9 G/DL (ref 6–8.2)
RBC # BLD AUTO: 4.76 M/UL (ref 4.2–5.4)
SODIUM SERPL-SCNC: 137 MMOL/L (ref 135–145)
TSH SERPL DL<=0.005 MIU/L-ACNC: 7.15 UIU/ML (ref 0.38–5.33)
WBC # BLD AUTO: 4.3 K/UL (ref 4.8–10.8)

## 2019-04-17 PROCEDURE — 700111 HCHG RX REV CODE 636 W/ 250 OVERRIDE (IP): Performed by: FAMILY MEDICINE

## 2019-04-17 PROCEDURE — 99233 SBSQ HOSP IP/OBS HIGH 50: CPT | Performed by: INTERNAL MEDICINE

## 2019-04-17 PROCEDURE — 700111 HCHG RX REV CODE 636 W/ 250 OVERRIDE (IP): Performed by: INTERNAL MEDICINE

## 2019-04-17 PROCEDURE — 84443 ASSAY THYROID STIM HORMONE: CPT

## 2019-04-17 PROCEDURE — 80053 COMPREHEN METABOLIC PANEL: CPT

## 2019-04-17 PROCEDURE — 700105 HCHG RX REV CODE 258: Performed by: INTERNAL MEDICINE

## 2019-04-17 PROCEDURE — 770020 HCHG ROOM/CARE - TELE (206)

## 2019-04-17 PROCEDURE — 99233 SBSQ HOSP IP/OBS HIGH 50: CPT | Performed by: FAMILY MEDICINE

## 2019-04-17 PROCEDURE — A9270 NON-COVERED ITEM OR SERVICE: HCPCS | Performed by: INTERNAL MEDICINE

## 2019-04-17 PROCEDURE — 97530 THERAPEUTIC ACTIVITIES: CPT

## 2019-04-17 PROCEDURE — 700102 HCHG RX REV CODE 250 W/ 637 OVERRIDE(OP): Performed by: INTERNAL MEDICINE

## 2019-04-17 PROCEDURE — 85025 COMPLETE CBC W/AUTO DIFF WBC: CPT

## 2019-04-17 PROCEDURE — 36415 COLL VENOUS BLD VENIPUNCTURE: CPT

## 2019-04-17 RX ADMIN — MEROPENEM 2 G: 1 INJECTION, POWDER, FOR SOLUTION INTRAVENOUS at 04:55

## 2019-04-17 RX ADMIN — OMEPRAZOLE 20 MG: 20 CAPSULE, DELAYED RELEASE ORAL at 04:54

## 2019-04-17 RX ADMIN — ACETAMINOPHEN 650 MG: 325 TABLET, FILM COATED ORAL at 04:55

## 2019-04-17 RX ADMIN — MEROPENEM 2 G: 1 INJECTION, POWDER, FOR SOLUTION INTRAVENOUS at 21:27

## 2019-04-17 RX ADMIN — GABAPENTIN 300 MG: 300 CAPSULE ORAL at 17:57

## 2019-04-17 RX ADMIN — QUETIAPINE FUMARATE 25 MG: 25 TABLET ORAL at 17:57

## 2019-04-17 RX ADMIN — ACETAMINOPHEN 650 MG: 325 TABLET, FILM COATED ORAL at 17:57

## 2019-04-17 RX ADMIN — POTASSIUM CHLORIDE 40 MEQ: 1500 TABLET, EXTENDED RELEASE ORAL at 17:57

## 2019-04-17 RX ADMIN — MEROPENEM 2 G: 1 INJECTION, POWDER, FOR SOLUTION INTRAVENOUS at 11:26

## 2019-04-17 RX ADMIN — GABAPENTIN 300 MG: 300 CAPSULE ORAL at 04:55

## 2019-04-17 RX ADMIN — POTASSIUM CHLORIDE 40 MEQ: 1500 TABLET, EXTENDED RELEASE ORAL at 04:55

## 2019-04-17 RX ADMIN — KETOROLAC TROMETHAMINE 15 MG: 30 INJECTION, SOLUTION INTRAMUSCULAR at 21:26

## 2019-04-17 RX ADMIN — LOSARTAN POTASSIUM 50 MG: 50 TABLET ORAL at 04:55

## 2019-04-17 RX ADMIN — ACETAMINOPHEN 650 MG: 325 TABLET, FILM COATED ORAL at 11:24

## 2019-04-17 RX ADMIN — VANCOMYCIN HYDROCHLORIDE 600 MG: 100 INJECTION, POWDER, LYOPHILIZED, FOR SOLUTION INTRAVENOUS at 21:27

## 2019-04-17 ASSESSMENT — COGNITIVE AND FUNCTIONAL STATUS - GENERAL
HELP NEEDED FOR BATHING: A LOT
SUGGESTED CMS G CODE MODIFIER DAILY ACTIVITY: CL
DAILY ACTIVITIY SCORE: 11
DRESSING REGULAR UPPER BODY CLOTHING: A LOT
DRESSING REGULAR LOWER BODY CLOTHING: A LOT
EATING MEALS: TOTAL
PERSONAL GROOMING: A LOT
TOILETING: A LOT

## 2019-04-17 ASSESSMENT — ENCOUNTER SYMPTOMS
NAUSEA: 0
SHORTNESS OF BREATH: 0
MYALGIAS: 0
SENSORY CHANGE: 0
COUGH: 0
VOMITING: 0
BACK PAIN: 1
FEVER: 0
SPEECH CHANGE: 0

## 2019-04-17 ASSESSMENT — PATIENT HEALTH QUESTIONNAIRE - PHQ9
1. LITTLE INTEREST OR PLEASURE IN DOING THINGS: NOT AT ALL
SUM OF ALL RESPONSES TO PHQ9 QUESTIONS 1 AND 2: 0
2. FEELING DOWN, DEPRESSED, IRRITABLE, OR HOPELESS: NOT AT ALL

## 2019-04-17 NOTE — PROGRESS NOTES
Received report at 1900 at bedside. Pt A&O x 2 disoriented to place and time, no C/O pain. Completed assessment. POC discussed with patient at bedside. Bed locked and low. Belongings and call light within reach.

## 2019-04-17 NOTE — ASSESSMENT & PLAN NOTE
Likely due to combination of infection and volume loss.  Echocardiogram with normal ejection fraction and no significant valvular abnormalities.  Tachycardia has resolved

## 2019-04-17 NOTE — CARE PLAN
Problem: Communication  Goal: The ability to communicate needs accurately and effectively will improve  Outcome: PROGRESSING SLOWER THAN EXPECTED      Problem: Safety  Goal: Will remain free from injury  Outcome: PROGRESSING AS EXPECTED    Goal: Will remain free from falls  Outcome: PROGRESSING AS EXPECTED      Problem: Infection  Goal: Will remain free from infection  Outcome: PROGRESSING AS EXPECTED      Problem: Venous Thromboembolism (VTW)/Deep Vein Thrombosis (DVT) Prevention:  Goal: Patient will participate in Venous Thrombosis (VTE)/Deep Vein Thrombosis (DVT)Prevention Measures  Outcome: PROGRESSING AS EXPECTED      Problem: Bowel/Gastric:  Goal: Normal bowel function is maintained or improved  Outcome: PROGRESSING AS EXPECTED    Goal: Will not experience complications related to bowel motility  Outcome: PROGRESSING AS EXPECTED      Problem: Knowledge Deficit  Goal: Knowledge of disease process/condition, treatment plan, diagnostic tests, and medications will improve  Outcome: PROGRESSING AS EXPECTED    Goal: Knowledge of the prescribed therapeutic regimen will improve  Outcome: PROGRESSING AS EXPECTED      Problem: Discharge Barriers/Planning  Goal: Patient's continuum of care needs will be met  Outcome: PROGRESSING AS EXPECTED      Problem: Mobility  Goal: Risk for activity intolerance will decrease  Outcome: PROGRESSING SLOWER THAN EXPECTED      Problem: Skin Integrity  Goal: Risk for impaired skin integrity will decrease  Outcome: PROGRESSING AS EXPECTED      Problem: Respiratory:  Goal: Respiratory status will improve  Outcome: PROGRESSING AS EXPECTED      Problem: Psychosocial Needs:  Goal: Level of anxiety will decrease  Outcome: PROGRESSING AS EXPECTED      Problem: Pain Management  Goal: Pain level will decrease to patient's comfort goal  Outcome: PROGRESSING AS EXPECTED      Problem: Urinary Elimination:  Goal: Ability to reestablish a normal urinary elimination pattern will improve  Outcome:  PROGRESSING AS EXPECTED

## 2019-04-17 NOTE — PROGRESS NOTES
"[facility] Medicine Daily Progress Note    Date of Service  4/16/2019    Chief Complaint  68 y.o. female admitted 3/8/2019 with right hip pain, fever and abnormal brain mri.    Hospital Course    Patient started on empiric antibiotics given fever.  She had abnormal findings on MRI brain and CT showed \"lesion\" on right iliopsoas.  This lesion was biopsied and turned out to be abscess.  She has history of breast cancer and there is also concern of brain lesions being metastatic though their appearance is not typical of this.      Interval Problem Update  3/12 Discussed with Dr Turner for second opinion of brain lesions and based on appearance not able to r/o or r/i any diagnosis.  Given patient's presentation of confusion, hip pain and fever  - this is more supportive of infectious etiology rather than malignant.  Fluid from right gluteal area sent to micro and as of yet - no growth.  Continue zosyn for now.  3/13 Patient with slight improvement in mentation.  Blood cultures and abscess cultures are showing no growth at this time.  TTE being done while I examined patient - no evidence of vegetations on this test.  ID consultation pending - d/w Dr Garcia.  3/14 Patient feeling well today, her pain is present but not as bad as prior to admission.  She was able to follow the conversation today and is agreeable to move forward with the CHAS tomorrow.  I spoke with her brother, René, and updated him on condition yesterday afternoon also.  Will also have PICC placed in next few days as long as blood cultures remain negative as I expect a prolonged antibiotic course of treatment.  3/15 Patient without new complaints, states she needs help to move in bed.  CHAS showed no evidence of vegetations and regular diet resumed.  Culture remains negative and biopsy of brain lesion may prove needed - will watch through the weekend and if mentation does not continue to improve, will discuss with neurosurgery on Monday.  3/16 Patient states " she is okay today, mentation has waxed and waned without significant improvement.  She was febrile earlier today.  No other acute events.  3/17 Patient with significant improvement in mentation today, she is aware of her hospitalization, not falling asleep mid sentence.  Her pain mediations were held most of yesterday and likely far too strong for patient and were affecting her awareness.  Oxycodone 5 discontinued and will use tramadol instead unless pain not well controlled.  CT brain with contrast ordered as a quick scan for comparison to MRI.    3/18 Patient feeling same today, discussed need to discuss with neurosurgery for possible biopsy.  Discussed with Dr Nguyen, he reviewed MRI and CT brain and he feels they are likely metastatic lesions but are far too small to biopsy.  His recommendation is to continue with antibiotics and re-image in 2 weeks to see if any change that would be amenable to biopsy or that would further declare infection.  3/26 Patient mental status continues to wax and wane.  ID ordered MRI lumbar, pelvis and sacrum for evaluation for source of infection, patient with continued low back pain and no real improvement since I saw her 7 days prior with continued antibiotics during this time.  Cultures have not growth pathogen, repeat MRI 3/28.   3/27 Patient somnolent most of the day today, MRI's completed and showing 2 areas of fluid collections, given her history are likely abscesses and will require drainage, CT guided drainage requested and patient NPO at MN for this.  D/w ID - cultures will be done on fluid collection.  Repeat CT brain ordered for tomorrow.  3/28 Patient up to chair, diet resumed as lovenox given this am and drainage of abscesses deferred until tomorrow.  Vanco/merrem to continue and cultures will be collected from abscesses.  3/29 Patient went to IR today, had drain placed in the right buttock into abscess cavity and fluid sent for culture.  Potassium is slightly low,  continue with PO replacement.  HR has improved from yesterday but patient PO intake still not at normal level, increase IVF rate to 100cc/hour.  3/30 Patient without fever since drain placed right buttock.  Purulent material in ÁNGEL bulb. Cultures thus far are still showing no growth.  Antibiotics to continue.  CT head ordered to evaluate change in past 2 weeks as recommended by neurosurgery.  3/31 Patient with fevers overnight - was altered during this time but has recovered.  She denies pain when examined by me.  She still has purulent material in the drainage tubing.  Will continue with current IV antibiotics - 6 weeks total antibiotics suspected - end date would be 4/24/19.  D/w ID.  4/1 Patient remains intermittently somnolent.  She wakes easily but falls back to sleep quickly.  She remained afebrile overnight.  ÁNGEL drain continues to drain purulent material.  ABX through 4/24 - once accepting facility found, patient okay to transfer with midline intact for completion of antibiotics.    4/9 Patient somnolent when examined.  CT showed resolution of fluid collection where ÁNGEL in place.  RN to remove ÁNGEL today.  All cultures still remain without growth.  4/10 Patient seen when working with OT, she is awake but gets off task quickly and is easily fatigued.  ÁNGEL drain removed yesterday.  Patient states she has pain in the lumbar spine when she coughs - just above her surgical area.  She has been in bed for nearly 1 month and this is likely not helping her back pain - educated need for OOB as often as tolerated.  4/11 Patient had bowel incontinence prior to my entering room, states she thinks she needs a bed pan.  CNA notified.  She continues to have low back pain in the area of her surgery and given her bed-bound status during her infection and intermittent confusion, this is not unexpected.  Continuing with therapy recommended and patient is 1:1 feeding given her weakness to feed herself.  4/12 Patient tearful this am,  she is concerned that she will not walk again, support given and educated that she is weak and the inability to walk is only temporary.  She needs to work with therapy and get stronger and I need to stop her narcotics and only give tyelnol/ibuprofen for pain management along with non-narcotic alternatives.  I spoke to patient's brother, René, at her request.  4/13 Patient up to chair when evaluated, mentation improved with discontinuation of narcotics. Brother is coming to visit this weekend and she is looking forward to this.  Ortho recommendations still pending - per ID she spoke to Dr Porter who is consulting Dr Sandra for recommendations.  4/14 Patient up to chair with assistance today.  CT brain remains same as prior 2 weeks ago.  Still pending ortho consultation.  4/15 Patient to be seen by ortho today per Dr Porter.  D/w dietary - patient with weight loss and poor po intake, she needs to be a 1:1 feed patient and if she fails this, will likely need tube feeding to get adequate nutrition.  4/16: Lethargic.  Opens eyes to physical stimulus but drifts quickly back to sleep.  Has been tachycardic with soft blood pressure in the morning.  Lasix held.  Palliative consulted.  Consultants/Specialty  ID - Sarah/Ashish  Ortho -   Palliative  Code Status  full    Disposition  SNF/LTAC in [place]    Review of Systems  Review of Systems   Unable to perform ROS: Mental acuity   Musculoskeletal: Back pain: lumbar/sacral area.        Physical Exam  Temp:  [36.5 °C (97.7 °F)-37.8 °C (100.1 °F)] 36.8 °C (98.2 °F)  Pulse:  [] 129  Resp:  [20-28] 28  BP: ()/() 100/64  SpO2:  [95 %-98 %] 96 %    Physical Exam   Constitutional: She appears lethargic. No distress.   HENT:   Head: Normocephalic and atraumatic.   Mouth/Throat: No oropharyngeal exudate.   Eyes: Pupils are equal, round, and reactive to light. Conjunctivae are normal. Right eye exhibits no discharge. Left eye exhibits no discharge.   Neck:  Neck supple. No tracheal deviation present. No thyromegaly present.   Cardiovascular: Normal rate, regular rhythm and normal heart sounds.  Exam reveals no gallop and no friction rub.    Pulmonary/Chest: Effort normal and breath sounds normal. No respiratory distress.   Abdominal: Soft. Bowel sounds are normal.   Musculoskeletal: She exhibits no edema or tenderness.   ÁNGEL drain removed       Neurological: She appears lethargic. No cranial nerve deficit.   Mentation labile     Skin: Skin is warm. She is not diaphoretic. No erythema.   Nursing note and vitals reviewed.      Fluids  No intake or output data in the 24 hours ending 04/16/19 1721    Laboratory  Recent Labs      04/14/19   0025  04/15/19   0007  04/16/19   0531   WBC  4.0*  5.5  4.7*   RBC  4.70  5.06  5.09   HEMOGLOBIN  11.4*  12.4  12.7   HEMATOCRIT  38.1  40.8  40.2   MCV  81.1*  80.6*  79.0*   MCH  24.3*  24.5*  25.0*   MCHC  29.9*  30.4*  31.6*   RDW  50.3*  51.1*  49.8   PLATELETCT  126*  143*  131*   MPV  10.4  11.1  10.6     Recent Labs      04/14/19   0024  04/15/19   0007  04/16/19   0531   SODIUM  135  134*  135   POTASSIUM  5.3  5.0  4.1   CHLORIDE  105  106  103   CO2  25  22  22   GLUCOSE  102*  88  105*   BUN  22  15  15   CREATININE  0.59  0.58  0.57   CALCIUM  10.3  10.7*  10.9*                   Imaging  CT-CTA CHEST PULMONARY ARTERY W/ RECONS   Final Result         1.  No large central pulmonary embolus is appreciated, evaluation of the subsegmental branches is essentially nondiagnostic due to motion artifacts. Additional imaging would be required for definitive exclusion of small distal pulmonary emboli.   2.  Trace pericardial effusion   3.  Hepatomegaly   4.  Atherosclerosis.   5.  Right pulmonary nodules, the largest is a 7.7 mm pulmonary nodule, see nodule follow-up recommendations below.      Low Risk: CT at 3-6 months, then consider CT at 18-24 months      High Risk: CT at 3-6 months, then at 18-24 months      Comments: Use most  suspicious nodule as guide to management. Follow-up intervals may vary according to size and risk.      Low Risk - Minimal or absent history of smoking and of other known risk factors.      High Risk - History of smoking or of other known risk factors.      Note: These recommendations do not apply to lung cancer screening, patients with immunosuppression, or patients with known primary cancer.      Fleischner Society 2017 Guidelines for Management of Incidentally Detected Pulmonary Nodules in Adults         DX-CHEST-PORTABLE (1 VIEW)   Final Result         1.  Mild pulmonary edema and/or infiltrate   2.  Cardiomegaly      CT-HEAD WITH & W/O   Final Result      1.  No change in scattered hyperenhancing lesions throughout the brain, cerebellum and visualized brainstem. Some of them demonstrate mild associated edema, similar to prior study.   2.  No CT evidence of infarct or hemorrhage.      CT-NEEDLE BX-MUSCLE RIGHT   Final Result   Addendum 1 of 1   Addendum:   The biopsy/drainage was done utilizing low dose optimization CT techniques    including auto modulation for  imaging and low mA CT/fluoroscopy mode    for the procedure.      Final      CT-guided aspiration of pus like material in the right gluteal muscle lesion.      CT-ABDOMEN-PELVIS WITH   Final Result      1.  Resolution of right iliacus abscess with no associated fluid adjacent to the drainage catheter      2.  Interval removal of right gluteal drainage catheter      3.  Surgical screw traversing both sacroiliac joint with associated pathologic fracture of the right ilium and right medial sacrum      4.  Hepatomegaly      CT-DRAIN-HEMATOMA - SEROMA   Final Result      1.  CT-GUIDED RETROPERITONEAL (EXTRAPERITONEAL) RIGHT PELVIC ABSCESS DRAINAGE AT THE RIGHT ILIOPSOAS. ORDERS WERE WRITTEN FOR ONCE DAILY IRRIGATION OF THE CATHETER WITH 5 ML STERILE SALINE.      2.  THE CURRENT PLAN IS TO MONITOR DRAINAGE OUTPUT AND OBTAIN A FOLLOWUP CT SCAN IN 5-7 DAYS  IF CLINICALLY INDICATED.      CT-ABDOMEN-PELVIS WITH   Final Result      1.  Rim-enhancing fluid collection in the right iliopsoas muscle may have increased in size slightly from the prior exam.      2.  Pigtail catheter in the right gluteus medius muscle. No residual fluid collection is appreciated.      3.  Pathologic fracture of the right ilium. Status post right SI joint fusion.      4.  Atherosclerosis.      5.  Cholelithiasis.      CT-HEAD WITH & W/O   Final Result      1.  Interval decrease in size and level of enhancement of multiple small punctate foci in both cerebral hemispheres and in the midbrain consistent with improving septic emboli.      2.  No hemorrhage or mass effect.      CT-DRAIN-RETROPERITONEAL   Final Result      1.  CT GUIDED CATHETER DRAINAGE OF RIGHT GLUTEAL ABSCESS.   2.  THE CURRENT PLAN IS TO OBTAIN A FOLLOW-UP CT SCAN IN 5-7 DAYS IF INDICATED. ORDERS WERE WRITTEN FOR ONCE DAILY IRRIGATION OF THE CATHETER WITH 5 ML STERILE SALINE FOR 3 DAYS.   3. THE ILIOPSOAS COLLECTION WAS NOT AMENABLE TO CATHETER DRAINAGE AS INTERVENING BOWEL AND/OR BONY STRUCTURES OBSTRUCT A PATHWAY TO THIS COLLECTION AT THIS TIME.      MR-LUMBAR SPINE-WITH & W/O   Final Result      1.  There are multifocal enhancing fluid collections in the right iliopsoas muscles and gluteus muscles adjacent to the right ilium. Right iliopsoas fluid collection measures an approximately 3.9 x 2.9 cm. The right gluteal fluid collection measures an    approximately 3.5 x 2.8 cm in size. The differential diagnosis includes postsurgical seroma and abscess.   2.  Post operative and degenerative changes as described above.      MR-PELVIS-WITH & W/O AND SEQUENCES   Final Result      1.  Surgical screw traverses both sacroiliac joint across presumed pathologic fracture of the right sacrum and the hardware obscures the adjacent tissues.      2.  No other evidence for bony metastatic disease      3.  Right gluteal musculature rim-enhancing  fluid collection measuring 3.4 x 2.8 cm. This could be a postoperative seroma versus abscess      4.  osteoarthritis of both hip joint      5.  Prior hysterectomy      IR-MIDLINE CATHETER INSERTION WO GUIDANCE > AGE 3   Final Result                  Ultrasound-guided midline placement performed by qualified nursing staff    as above.          CT-HEAD WITH   Final Result      Multiple subcentimeter too numerous to count enhancing masses scattered throughout the supratentorial and infratentorial brain. These demonstrate some surrounding vasogenic edema and most likely represent multifocal metastatic lesions. Other    considerations would include multifocal abscesses or septic emboli.      EC-CHAS W/O CONT   Final Result      EC-CHAS W/O CONT   Final Result      EC-ECHOCARDIOGRAM COMPLETE W/O CONT   Final Result      OUTSIDE IMAGES-CT CHEST/ABDOMEN/PELVIS   Final Result      OUTSIDE IMAGES-DX CHEST   Final Result      TD-[id number] FILM X-RAY   Final Result      OUTSIDE IMAGES-MR BRAIN   Final Result      OUTSIDE IMAGES-MR BRAIN   Final Result           Assessment/Plan  * Lesion of brain   Assessment & Plan    Metastatic cancer vs infectious etiology  D/w Dr Black for questionable brain mets, recommending IR Bx of psoas muscle   IR Bx of psoas muscle ordered - was abscess  Empiric treatment of infection  TTE and CHAS negative for vegetations.  D/w Dr Nguyen - lesions likely metastatic based on his interpretation of MRI/CT - too small to biopsy, recommends continuing antibiotics and re-imaging in 2 weeks to see if there has been any change.  CT brain to be repeated - lesions same as prior 2 weeks ago.  Palliative consulted.       Encephalopathy   Assessment & Plan    Waxes and wanes.  Likely secondary to multiple brain lesions.  Improved with dc of narcotics and using scheduled tyelnol for pain.         Mass of iliopsoas muscle group   Assessment & Plan    Return of fluid collection, repeat drainage in IR with  cultures, drain placed 3/29 - 10ml purulent material drained and sent for culture - no growth a/o 3/30  Was on linezolid and cefepime and now on vanco/merrem - end date to be 6 weeks of treatment. (4/24/19)  ID following.  Repeat CT abdomen shows resolution of fluid collection and ÁNGEL output dwindling - removed ÁNGEL drain  Cultures are negative so far.  CHAS negative for endocarditis.  Patient also has right hip ORIF.  Orthopedic surgery recommended hardware to stay for at least 6 months to promote adequate healing and to continue with antibiotics for now.         Normocytic anemia   Assessment & Plan    stable     Hyponatremia- (present on admission)   Assessment & Plan    Mildly low - doubt contribution to mentation         RLS (restless legs syndrome)- (present on admission)   Assessment & Plan    Pramipexole       Diabetes mellitus type 2 in obese (HCC)- (present on admission)   Assessment & Plan    Well controlled   Short acting insulin as needed   Accu-Checks, hypoglycemia protocol          History of breast cancer- (present on admission)   Assessment & Plan    infection to brain  No known active breast cancer.       COPD (chronic obstructive pulmonary disease) (HCC)- (present on admission)   Assessment & Plan    Oxygen as needed, Respiratory protocol, Bronchodilators, Incentive spirometry      Sinus tachycardia- (present on admission)   Assessment & Plan    Likely due to combination of infection and volume loss.  Echocardiogram with normal ejection fraction and no significant valvular abnormalities.  Check TSH.  Continue to monitor.     Urinary tract infection   Assessment & Plan    Culture remain no growth.       Fungal infection of the groin   Assessment & Plan    on nystatin powder 3/24     History of deep vein thrombosis- (present on admission)   Assessment & Plan    History of deep vein thrombosis   Was on Rivaroxaban as outpatient.     Held for Bx Mar 11  Restart AC after completing abx treatment          Essential hypertension- (present on admission)   Assessment & Plan    Restarted on losartan and furosemide with hold parameters.     Fever   Assessment & Plan    PRN Tylenol.     GERD (gastroesophageal reflux disease)- (present on admission)   Assessment & Plan    Omeprazole     Chronic pain- (present on admission)   Assessment & Plan     Multifactorial  Likely secondary to abscess, fracture, bed bound status  Pain control       Plan of care discussed with multidisciplinary team during rounds.    VTE prophylaxis: scds

## 2019-04-17 NOTE — PROGRESS NOTES
Infectious Disease Progress Note    Author: Beatriz Johnson M.D. Date & Time of service: 4/17/2019  4:52 PM    Chief Complaint:  Multiple brain lesions  Iliopsoas lesion    Interval History:  68-year-old female with history of diabetes and breast cancer, admitted 3/8/2019 with abdominal pain, iliopsoas lesion, and an abnormal MRI with multiple brain lesions.  4/1/2019 no fevers.  Easily arousable.  ÁNGEL continues to drain.  4/2/2019-no fevers.  Awake and responsive but feels weak.  There is minimal drainage from the ÁNGEL.  As per the nursing the patient is incontinent of stool.  But when you talk to her she states she is just is not able to get up on time.  4/3/2019-complains of some weakness in the hand. No fevers. The drain is not draining much  4/4- no fevers. She is anxious since she has been NPO. Awaiting IR drain  4/5 no fevers.  Got her drain.  There was purulent fluid.  WBCs 4.6.  4/8 afebrile, white count 3.3.  IR drain remains in place.  Continues to have significant word finding difficulty.  4/9 per RN, patient more awake and interactive but remains confused with word finding difficulty.  Resting comfortably this morning  4/10 afebrile, white count 4.6.  Patient much more awake and interactive today.  Reports no new issues, reports no pain anywhere.  IR drain has been removed.  4/11 afebrile WBC 4.2 patient complains of pain in her legs and feet.  She is being assisted with feeding.  Denies any headaches or neck pain  4/12/2019 no fevers.  Patient is alert and oriented.  Although really slow.  Apparently when she gets pain medications she becomes confused.  But this morning she is answering all questions appropriately.  4/13/2019 patient is somewhat confused.  No fevers.  No new issues overnight.  4/14/2019 no fevers.  Somewhat confused.  WBC is 4.  4/15/2019 no fevers.  Complains of back pain and joint pain.  WBCs 5.5  4/16/2019 patient was transferred to telemetry overnight.  Much more  lethargic.  2019-no fevers.  Wants to know the plan.  More awake and responsive.  Labs Reviewed    Review of Systems:  Review of Systems   Constitutional: Positive for malaise/fatigue. Negative for fever.   HENT: Negative for hearing loss.    Respiratory: Negative for cough and shortness of breath.    Cardiovascular: Negative for chest pain and leg swelling.   Gastrointestinal: Negative for nausea and vomiting.   Genitourinary: Negative for dysuria.   Musculoskeletal: Positive for back pain. Negative for myalgias.   Neurological: Negative for sensory change and speech change.       Hemodynamics:  Temp (24hrs), Av.7 °C (98 °F), Min:36.3 °C (97.3 °F), Max:37.1 °C (98.8 °F)  Temperature: 37.1 °C (98.8 °F)  Pulse  Av.7  Min: 59  Max: 153  Blood Pressure : 125/65       Physical Exam:  Physical Exam   Constitutional: She is oriented to person, place, and time. She appears well-developed and well-nourished. No distress.   HENT:   Head: Normocephalic and atraumatic.   Eyes: Pupils are equal, round, and reactive to light. EOM are normal. Right eye exhibits no discharge. Left eye exhibits no discharge.   Neck: Neck supple.   Cardiovascular: Normal rate and intact distal pulses.    Murmur heard.  Pulmonary/Chest: Effort normal and breath sounds normal. No respiratory distress. She has no wheezes.   Abdominal: Soft. Bowel sounds are normal. She exhibits no distension. There is no tenderness.   Musculoskeletal: She exhibits no edema, tenderness or deformity.   Right upper extremity PICC line   Lymphadenopathy:     She has no cervical adenopathy.   Neurological: She is alert and oriented to person, place, and time.   Moves all extremities   Skin: Skin is warm. No rash noted. She is not diaphoretic.   Nursing note and vitals reviewed.    Meds:    Current Facility-Administered Medications:   •  hydrALAZINE  •  ketorolac  •  meropenem  •  haloperidol lactate  •  QUEtiapine  •  acetaminophen  •  gabapentin  •   labetalol  •  loperamide  •  losartan  •  MD Alert...Vancomycin per Pharmacy  •  Notify provider if pain remains uncontrolled **AND** Use the numeric rating scale (NRS-11) on regular floors and Critical-Care Pain Observation Tool (CPOT) on ICUs/Trauma to assess pain **AND** Pulse Ox (Oximetry) **AND** Pharmacy Consult Request **AND** If patient difficult to arouse and/or has respiratory depression, stop any opiates that are currently infusing and call a Rapid Response. **AND** [DISCONTINUED] oxyCODONE immediate-release **AND** [DISCONTINUED] oxyCODONE immediate-release **AND** [DISCONTINUED] morphine injection  •  omeprazole  •  ondansetron  •  ondansetron  •  potassium chloride SA  •  pramipexole  •  Respiratory Care per Protocol  •  vancomycin    Labs:  Recent Labs      04/15/19   0007  04/16/19   0531  04/17/19   0016   WBC  5.5  4.7*  4.3*   RBC  5.06  5.09  4.76   HEMOGLOBIN  12.4  12.7  11.8*   HEMATOCRIT  40.8  40.2  38.3   MCV  80.6*  79.0*  80.5*   MCH  24.5*  25.0*  24.8*   RDW  51.1*  49.8  51.9*   PLATELETCT  143*  131*  127*   MPV  11.1  10.6  11.1   NEUTSPOLYS  70.90  67.50  56.20   LYMPHOCYTES  15.70*  17.30*  20.10*   MONOCYTES  10.30  11.60  16.40*   EOSINOPHILS  2.20  2.10  5.90   BASOPHILS  0.50  1.10  1.20     Recent Labs      04/15/19   0007  04/16/19   0531  04/17/19   0015   SODIUM  134*  135  137   POTASSIUM  5.0  4.1  4.4   CHLORIDE  106  103  107   CO2  22  22  23   GLUCOSE  88  105*  91   BUN  15  15  18     Recent Labs      04/15/19   0007  04/16/19   0531  04/17/19   0015   ALBUMIN   --   3.2  2.7*   TBILIRUBIN   --   0.8  0.6   ALKPHOSPHAT   --   263*  198*   TOTPROTEIN   --   7.7  6.9   ALTSGPT   --   19  14   ASTSGOT   --   36  28   CREATININE  0.58  0.57  0.64       Imaging:  Ct-needle Bx-muscle Right    Result Date: 3/13/2019  3/11/2019 4:44 PM HISTORY/REASON FOR EXAM:  Right gluteal hypodense lesion. Moderate sedation was administered with continuous monitoring of the patient under  the direct supervision of  Medications: 2 mg of Versed and 200 mcg of fentanyl Total sedation time 60 minutes Procedure: After the informed consent the patient was placed on the CT table on prone position. Localization CT scan was obtained. It demonstrates hypodense area in the right gluteus muscle. Subsequently a 17-gauge needle was advanced into the lesion. 20  mL of pus was aspirated. The materials were sent to the pathology. The patient tolerated the procedure without any immediate competition.     CT-guided aspiration of pus like material in the right gluteal muscle lesion.    Ec-echocardiogram Complete W/o Cont    Result Date: 3/13/2019  Transthoracic Echo Report Echocardiography Laboratory CONCLUSIONS No prior study is available for comparison. Normal left ventricular systolic function. Left ventricular ejection fraction is visually estimated to be 65%. Normal diastolic function. Normal inferior vena cava size and inspiratory collapse. Aortic sclerosis without stenosis. Estimated right ventricular systolic pressure  is 33 mmHg. No obvious valvular vegetations are noted on a decent quality study.  If further valvular assessment is clinically indicated, consider transesophageal echocardiogram. SAM,  LV EF:  65    % FINDINGS Left Ventricle Normal left ventricular size and systolic function. Normal left ventricular wall thickness. Left ventricular ejection fraction is visually estimated to be 65%. Normal diastolic function. Right Ventricle Normal right ventricular size and systolic function. Right Atrium Normal right atrial size. Normal inferior vena cava size and inspiratory collapse. Left Atrium Normal left atrial size. Mitral Valve Structurally normal mitral valve without significant stenosis or regurgitation. Aortic Valve Aortic sclerosis without stenosis. No aortic insufficiency. Tricuspid Valve Structurally normal tricuspid valve. Mild tricuspid regurgitation. Right atrial pressure is  estimated to be 3 mmHg. Estimated right ventricular systolic pressure  is 33 mmHg. Pulmonic Valve The pulmonic valve is not well visualized. No pulmonic insufficiency. Pericardium Normal pericardium without effusion. Aorta The aortic root is normal.  Ascending aorta diameter is 3.0 cm. Claire Tripathi MD (Electronically Signed) Final Date:     13 March 2019                 14:38      Micro:  Results     Procedure Component Value Units Date/Time    URINALYSIS [[id number]]  (Abnormal) Collected:  04/12/19 1148    Order Status:  Completed Specimen:  Urine from Urine, Cath Updated:  04/12/19 1246     Color Yellow     Character Clear     Specific Gravity 1.011     Ph 7.0     Glucose Negative mg/dL      Ketones Negative mg/dL      Protein 30 (A) mg/dL      Bilirubin Negative     Urobilinogen, Urine 0.2     Nitrite Negative     Leukocyte Esterase Trace (A)     Occult Blood Negative     Micro Urine Req Microscopic    Narrative:       Collected By:[id number] MAVIS URIBE          Assessment:  Active Hospital Problems    Diagnosis   • *Lesion of brain [G93.9]   • Mass of iliopsoas muscle group [M62.89]   • Encephalopathy [G93.40]   •    • History of breast cancer [Z85.3]   • Diabetes mellitus type 2 in obese (HCC) [E11.69, E66.9]       Plan:  Multiple brain lesions-likely abscesses versus metastatic lesions  Leukopenia  Confusion improving  MRI with scattered small lesions incl aaron, too small to biopsy per neurosurgery  TTE neg; CHAS neg  Bcxs neg to date  Repeat CT scan on 3/30/2019 shows decrease in the size of the brain lesions, supporting that these are abscesses  Repeat MRI brain prior to completion of abx    Iliopsoas abscess, on treatment  CT + 2.6 cm lesion in the right iliopsoas region  S/p aspiration +WBCs-cultures negative to date  Bcxs remain neg  Last vanc trough of 20.4 and 4/5  D/c cefepime 03/26 - may be contributing to confusion  Tolerating Meropenem started 03/26  MRI w/ contrast lumbar, pelvis 03/27 - Iliopsoas  and gluteal fluid collections noted.    Drain placed 3/29/2019  Cultures are negative so far  Repeat CT scan shows resolution of the abscess in the gluteus but increase in the iliopsoas  Another drain was placed on 4/4/2019 and it shows purulent fluid  Repeat CT from 4/8 with resolution of the fluid collections after drainage per my read  Aim  for at least 6 weeks of IV antibiotics (end date: 4/24/2018) likely followed by p.o. suppression given that the infection is adjacent to hardware  Consider repeat CHAS at some point given her multiple brain lesions suspicious for infective endocarditis  Continue broad-spectrum IV vancomycin and meropenem for now.  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  I spoke to orthopedics about logistics of taking the hardware out.  As per orthopedics this hardware needs to be in for 6 months.  H/o breast cancer  Markedly elevated alk phos, trending down  Continued diaz for mets    Type 2 diabetes mellitus  Hemoglobin A1c 6.3  Keep BS under 150 to help control current infection    Altered mental status  Appears to be waxing and waning  Multifactorial, likely large contribution from her multiple brain abscesses, monitor  The CT scan done on 4/13/2019 shows persistence of the lesions    I have performed a physical exam and reviewed and updated ROS and plan today 4/17/2019.  In review of yesterday's note 4/16/2019, there are no changes except as documented above.    Continue current plan as outlined above    Discussed with IM

## 2019-04-17 NOTE — DISCHARGE PLANNING
Anticipated Discharge Disposition: SNF    Action: LSW spoke to pt's brother extensively over the phone. LSW suggested possibly sending SNF referrals to local facilities since pt has been here for extended amount of time and they have tried multiple [place] SNF's. Pt's brother is agreeable, would like to see the choice form and make a top 3 decisions. LSW e-mailed choice form to pt's brother and provided phone number to call back once choice has been made.     Barriers to Discharge: None    Plan: Waiting on pt's brother to make choice for Local SNF.

## 2019-04-17 NOTE — THERAPY
"Occupational Therapy Treatment completed with focus on ADLs and ADL transfers.  Functional Status:  Max A with ADLs and txfs  Plan of Care: Will benefit from Occupational Therapy 3 times per week  Discharge Recommendations:  Equipment Will Continue to Assess for Equipment Needs. Post-acute therapy Discharge to a transitional care facility for continued therapy services.    See \"Rehab Therapy-Acute\" Patient Summary Report for complete documentation.   "

## 2019-04-17 NOTE — PROGRESS NOTES
Pharmacy Kinetics 68 y.o. female on vancomycin day # 24 2019    Currently on Vancomycin 600 mg iv q24hr   ()     Indication for Treatment: possible brain abscesses      Pertinent history per medical record: Admitted on 3/8/2019 for iliopsoas mass and multiple brain lesions. There is concern that this is infectious. All cultures are negative to date, but were all drawn while on antibiotics. CHAS was negative. Patient has a history of breast cancer and DM. ID is following. Plan for completion of abx (6 weeks) in house prior to transfer to [facility] in [place].      Other antibiotics: Meropenem 2g IV q8h     Allergies: Patient has no known allergies.      List concerns for renal function: age, and prolonged vancomycin duration, elevated BUN/SCr ratio      Pertinent cultures to date:   3/11/19 wound - right gluteal mass: negative   3/12/19 blood - peripheral x 2: negative   3/29/19 wound - right buttock: negative   3/31/19 blood - peripheral x 2: negative   19 wound - retroperitoneal drain aspirate: negative      MRSA nares swab if pneumonia is a concern (ordered/positive/negative/n-a): n/a    Recent Labs      04/15/19   0007  19   0531  19   0016   WBC  5.5  4.7*  4.3*   NEUTSPOLYS  70.90  67.50  56.20     Recent Labs      04/15/19   0007  19   0531  19   0015   BUN  15  15  18   CREATININE  0.58  0.57  0.64   ALBUMIN   --   3.2  2.7*     No results for input(s): VANCOTROUGH, VANCOPEAK, VANCORANDOM in the last 72 hours.  Intake/Output Summary (Last 24 hours) at 19 1147  Last data filed at 19 0800   Gross per 24 hour   Intake              240 ml   Output                0 ml   Net              240 ml      /60   Pulse 84   Temp 36.4 °C (97.6 °F) (Temporal)   Resp 16   Ht 1.524 m (5')   Wt 57.6 kg (126 lb 15.8 oz)   SpO2 100%  Temp (24hrs), Av.6 °C (97.9 °F), Min:36.3 °C (97.3 °F), Max:37 °C (98.6 °F)      A/P   1. Vancomycin dose change: none  2. Next vancomycin  level: tonight, 4/17/18, at 2130  (ordered)  3. Goal trough: 16-20 mcg/mL  4. Comments: No leukocytosis and remains afebrile; some tachycardia noted. Renal function appears stable per renal indices; no I&O's to assess UOP. Continue current dose. Trough level ordered for tonight to assess accumulation and dose appropriateness. Pharmacy to continue to follow ID recommendations.        Michelle Jefferson, PharmD., BCPS

## 2019-04-17 NOTE — DIETARY
Nutrition Services: Update   Day 40 of admit.  Muriel Watkins is a 68 y.o. female with admitting DX of metastatic brain cancer  encephalopathy    Pt is currently on Regular/ chopped meat diet- with noted poor intake/limited recent PO intake per ADL. Pt continues to receive Boost TID and protein milkshakes TID. RD discuss with MD on 4/15. Noted per MD today pt needs to be a 1:1 feed patient and if she fails this, will likely need tube feeding to get adequate nutrition. Discussed with RN, states pt is drank 1 1/2 boosts today so far and has had <25% for breakfast and lunch tray.     Wt on 4/16: 57.6kg-via Bed Scale- wt trending down likely d/t poor intake, estimated potential 4.69 kg (10.7 lbs) loss (7%) within 2 weeks, however previous wt was Stand-up scale so unsure if accurate.     Malnutrition Risk: Does not meet criteria at this time     Recommendations/Plan:  1. Continue to monitor intake. Consult RD for TF recommendations if POC  2. Encourage intake of meals/supplements   3. Document intake of all meals as % taken in ADL's to provide interdisciplinary communication across all shifts.   4. Monitor weight.  5. Nutrition rep will continue to see patient for ongoing meal and snack preferences.    RD following

## 2019-04-17 NOTE — PROGRESS NOTES
Bedside report received. Patient sleeping in bed. NO needs voided. Call bell within reach. Will continue to monitor.

## 2019-04-18 ENCOUNTER — APPOINTMENT (OUTPATIENT)
Dept: RADIOLOGY | Facility: MEDICAL CENTER | Age: 69
DRG: 981 | End: 2019-04-18
Attending: FAMILY MEDICINE
Payer: MEDICARE

## 2019-04-18 PROBLEM — D61.818 PANCYTOPENIA (HCC): Status: ACTIVE | Noted: 2019-03-08

## 2019-04-18 LAB
BASOPHILS # BLD AUTO: 0.9 % (ref 0–1.8)
BASOPHILS # BLD: 0.03 K/UL (ref 0–0.12)
EOSINOPHIL # BLD AUTO: 0.28 K/UL (ref 0–0.51)
EOSINOPHIL NFR BLD: 8.6 % (ref 0–6.9)
ERYTHROCYTE [DISTWIDTH] IN BLOOD BY AUTOMATED COUNT: 50.6 FL (ref 35.9–50)
HCT VFR BLD AUTO: 36.5 % (ref 37–47)
HGB BLD-MCNC: 11.1 G/DL (ref 12–16)
IMM GRANULOCYTES # BLD AUTO: 0.01 K/UL (ref 0–0.11)
IMM GRANULOCYTES NFR BLD AUTO: 0.3 % (ref 0–0.9)
LYMPHOCYTES # BLD AUTO: 0.43 K/UL (ref 1–4.8)
LYMPHOCYTES NFR BLD: 13.1 % (ref 22–41)
MCH RBC QN AUTO: 24.6 PG (ref 27–33)
MCHC RBC AUTO-ENTMCNC: 30.4 G/DL (ref 33.6–35)
MCV RBC AUTO: 80.9 FL (ref 81.4–97.8)
MONOCYTES # BLD AUTO: 0.45 K/UL (ref 0–0.85)
MONOCYTES NFR BLD AUTO: 13.8 % (ref 0–13.4)
NEUTROPHILS # BLD AUTO: 2.07 K/UL (ref 2–7.15)
NEUTROPHILS NFR BLD: 63.3 % (ref 44–72)
NRBC # BLD AUTO: 0 K/UL
NRBC BLD-RTO: 0 /100 WBC
PLATELET # BLD AUTO: 84 K/UL (ref 164–446)
PMV BLD AUTO: 10.2 FL (ref 9–12.9)
RBC # BLD AUTO: 4.51 M/UL (ref 4.2–5.4)
VANCOMYCIN TROUGH SERPL-MCNC: 42.2 UG/ML (ref 10–20)
WBC # BLD AUTO: 3.3 K/UL (ref 4.8–10.8)

## 2019-04-18 PROCEDURE — 99233 SBSQ HOSP IP/OBS HIGH 50: CPT | Performed by: INTERNAL MEDICINE

## 2019-04-18 PROCEDURE — 700111 HCHG RX REV CODE 636 W/ 250 OVERRIDE (IP): Performed by: INTERNAL MEDICINE

## 2019-04-18 PROCEDURE — 99233 SBSQ HOSP IP/OBS HIGH 50: CPT | Performed by: FAMILY MEDICINE

## 2019-04-18 PROCEDURE — 80202 ASSAY OF VANCOMYCIN: CPT

## 2019-04-18 PROCEDURE — 87040 BLOOD CULTURE FOR BACTERIA: CPT

## 2019-04-18 PROCEDURE — 74177 CT ABD & PELVIS W/CONTRAST: CPT

## 2019-04-18 PROCEDURE — A9270 NON-COVERED ITEM OR SERVICE: HCPCS | Performed by: FAMILY MEDICINE

## 2019-04-18 PROCEDURE — 700102 HCHG RX REV CODE 250 W/ 637 OVERRIDE(OP): Performed by: INTERNAL MEDICINE

## 2019-04-18 PROCEDURE — 700117 HCHG RX CONTRAST REV CODE 255: Performed by: FAMILY MEDICINE

## 2019-04-18 PROCEDURE — 36415 COLL VENOUS BLD VENIPUNCTURE: CPT

## 2019-04-18 PROCEDURE — 700102 HCHG RX REV CODE 250 W/ 637 OVERRIDE(OP): Performed by: FAMILY MEDICINE

## 2019-04-18 PROCEDURE — 700105 HCHG RX REV CODE 258: Performed by: INTERNAL MEDICINE

## 2019-04-18 PROCEDURE — 770020 HCHG ROOM/CARE - TELE (206)

## 2019-04-18 PROCEDURE — 700111 HCHG RX REV CODE 636 W/ 250 OVERRIDE (IP): Performed by: FAMILY MEDICINE

## 2019-04-18 PROCEDURE — 85025 COMPLETE CBC W/AUTO DIFF WBC: CPT

## 2019-04-18 PROCEDURE — A9270 NON-COVERED ITEM OR SERVICE: HCPCS | Performed by: INTERNAL MEDICINE

## 2019-04-18 RX ORDER — ACETAMINOPHEN 325 MG/1
650 TABLET ORAL EVERY 6 HOURS PRN
Status: DISCONTINUED | OUTPATIENT
Start: 2019-04-18 | End: 2019-04-30 | Stop reason: HOSPADM

## 2019-04-18 RX ADMIN — IOHEXOL 100 ML: 350 INJECTION, SOLUTION INTRAVENOUS at 20:12

## 2019-04-18 RX ADMIN — MEROPENEM 2 G: 1 INJECTION, POWDER, FOR SOLUTION INTRAVENOUS at 22:48

## 2019-04-18 RX ADMIN — MEROPENEM 2 G: 1 INJECTION, POWDER, FOR SOLUTION INTRAVENOUS at 05:53

## 2019-04-18 RX ADMIN — ACETAMINOPHEN 650 MG: 325 TABLET, FILM COATED ORAL at 10:49

## 2019-04-18 RX ADMIN — VANCOMYCIN HYDROCHLORIDE 600 MG: 100 INJECTION, POWDER, LYOPHILIZED, FOR SOLUTION INTRAVENOUS at 20:25

## 2019-04-18 RX ADMIN — GABAPENTIN 300 MG: 300 CAPSULE ORAL at 17:45

## 2019-04-18 RX ADMIN — POTASSIUM CHLORIDE 40 MEQ: 1500 TABLET, EXTENDED RELEASE ORAL at 05:53

## 2019-04-18 RX ADMIN — POTASSIUM CHLORIDE 40 MEQ: 1500 TABLET, EXTENDED RELEASE ORAL at 17:45

## 2019-04-18 RX ADMIN — OMEPRAZOLE 20 MG: 20 CAPSULE, DELAYED RELEASE ORAL at 05:52

## 2019-04-18 RX ADMIN — KETOROLAC TROMETHAMINE 15 MG: 30 INJECTION, SOLUTION INTRAMUSCULAR at 08:30

## 2019-04-18 RX ADMIN — LOSARTAN POTASSIUM 50 MG: 50 TABLET ORAL at 05:52

## 2019-04-18 RX ADMIN — GABAPENTIN 300 MG: 300 CAPSULE ORAL at 05:52

## 2019-04-18 RX ADMIN — MEROPENEM 2 G: 1 INJECTION, POWDER, FOR SOLUTION INTRAVENOUS at 12:31

## 2019-04-18 RX ADMIN — ACETAMINOPHEN 650 MG: 325 TABLET, FILM COATED ORAL at 05:52

## 2019-04-18 RX ADMIN — KETOROLAC TROMETHAMINE 15 MG: 30 INJECTION, SOLUTION INTRAMUSCULAR at 20:21

## 2019-04-18 ASSESSMENT — ENCOUNTER SYMPTOMS
SPEECH CHANGE: 0
BACK PAIN: 1
FEVER: 1
MYALGIAS: 0
VOMITING: 0
NAUSEA: 0
SHORTNESS OF BREATH: 0
SENSORY CHANGE: 0
DIAPHORESIS: 1
COUGH: 0

## 2019-04-18 NOTE — PROGRESS NOTES
Received report at 1900 at bedside. Pt A&O x 4 no C/O  pain. Completed assessment. POC discussed with patient at bedside. Bed locked and low. Belongings and call light within reach.

## 2019-04-18 NOTE — PROGRESS NOTES
Pharmacy Kinetics 68 y.o. female on vancomycin day # 25 2019    Currently on Vancomycin 600 mg iv q24hr   ()     Indication for Treatment: possible brain abscesses      Pertinent history per medical record: Admitted on 3/8/2019 for iliopsoas mass and multiple brain lesions. There is concern that this is infectious. All cultures are negative to date, but were all drawn while on antibiotics. CHAS was negative. Patient has a history of breast cancer and DM. ID is following. Plan for completion of abx (6 weeks) in house prior to transfer to [facility] in [place].      Other antibiotics: Meropenem 2g IV q8h     Allergies: Patient has no known allergies.      List concerns for renal function: age, and prolonged vancomycin duration, elevated BUN/SCr ratio      Pertinent cultures to date:   3/11/19 wound - right gluteal mass: negative   3/12/19 blood - peripheral x 2: negative   3/29/19 wound - right buttock: negative   3/31/19 blood - peripheral x 2: negative   19 wound - retroperitoneal drain aspirate: negative      MRSA nares swab if pneumonia is a concern (ordered/positive/negative/n-a): n/a    Recent Labs      19   0531  19   0016  19   0211   WBC  4.7*  4.3*  3.3*   NEUTSPOLYS  67.50  56.20  63.30     Recent Labs      19   0531  19   0015   BUN  15  18   CREATININE  0.57  0.64   ALBUMIN  3.2  2.7*     No results for input(s): VANCOTROUGH, VANCOPEAK, VANCORANDOM in the last 72 hours.  Intake/Output Summary (Last 24 hours) at 19 0957  Last data filed at 19 1126   Gross per 24 hour   Intake              240 ml   Output                0 ml   Net              240 ml      /71   Pulse (!) 131   Temp 37.9 °C (100.2 °F) (Oral)   Resp 20   Ht 1.524 m (5')   Wt 63.4 kg (139 lb 12.4 oz)   SpO2 95%  Temp (24hrs), Av.1 °C (98.7 °F), Min:36.6 °C (97.8 °F), Max:37.9 °C (100.2 °F)      A/P   1. Vancomycin dose change: none  1. Next vancomycin level: tonight, 18, at  2130  (ordered)  2. Goal trough: 16-20 mcg/mL  3. Comments: No leukocytosis and remains afebrile; some tachycardia noted. NNL to assess renal function; no I&O's to assess UOP. Continue current dose. Trough level was not obtained yesterday prior to dose so will order one for tonight to assess accumulation and dose appropriateness. Pharmacy to continue to follow ID recommendations.        Michelle Jefferson, BariD., BCPS

## 2019-04-18 NOTE — PROGRESS NOTES
2 RN skin check completed with Pallavi NGUYEN.    Sacrum red and blanching, Waffle mattress and Q2 turns in place  Heels boggy, pink and blanching, Heels floated  Ears Pink and blanching, Silicone tubing in place

## 2019-04-18 NOTE — PROGRESS NOTES
Infectious Disease Progress Note    Author: Beatriz Johnson M.D. Date & Time of service: 4/18/2019  2:29 PM    Chief Complaint:  Multiple brain lesions  Iliopsoas lesion    Interval History:  68-year-old female with history of diabetes and breast cancer, admitted 3/8/2019 with abdominal pain, iliopsoas lesion, and an abnormal MRI with multiple brain lesions.  4/1/2019 no fevers.  Easily arousable.  ÁNGEL continues to drain.  4/2/2019-no fevers.  Awake and responsive but feels weak.  There is minimal drainage from the ÁNGEL.  As per the nursing the patient is incontinent of stool.  But when you talk to her she states she is just is not able to get up on time.  4/3/2019-complains of some weakness in the hand. No fevers. The drain is not draining much  4/4- no fevers. She is anxious since she has been NPO. Awaiting IR drain  4/5 no fevers.  Got her drain.  There was purulent fluid.  WBCs 4.6.  4/8 afebrile, white count 3.3.  IR drain remains in place.  Continues to have significant word finding difficulty.  4/9 per RN, patient more awake and interactive but remains confused with word finding difficulty.  Resting comfortably this morning  4/10 afebrile, white count 4.6.  Patient much more awake and interactive today.  Reports no new issues, reports no pain anywhere.  IR drain has been removed.  4/11 afebrile WBC 4.2 patient complains of pain in her legs and feet.  She is being assisted with feeding.  Denies any headaches or neck pain  4/12/2019 no fevers.  Patient is alert and oriented.  Although really slow.  Apparently when she gets pain medications she becomes confused.  But this morning she is answering all questions appropriately.  4/13/2019 patient is somewhat confused.  No fevers.  No new issues overnight.  4/14/2019 no fevers.  Somewhat confused.  WBC is 4.  4/15/2019 no fevers.  Complains of back pain and joint pain.  WBCs 5.5  4/16/2019 patient was transferred to telemetry overnight.  Much more  lethargic.  2019-no fevers.  Wants to know the plan.  More awake and responsive.  2019 patient has no fevers.  She is drenching and sweats.  Complains of hip pain.  Labs Reviewed    Review of Systems:  Review of Systems   Constitutional: Positive for diaphoresis, fever and malaise/fatigue.   HENT: Negative for hearing loss.    Respiratory: Negative for cough and shortness of breath.    Cardiovascular: Negative for chest pain and leg swelling.   Gastrointestinal: Negative for nausea and vomiting.   Genitourinary: Negative for dysuria.   Musculoskeletal: Positive for back pain and joint pain. Negative for myalgias.   Neurological: Negative for sensory change and speech change.       Hemodynamics:  Temp (24hrs), Av.9 °C (98.5 °F), Min:36.5 °C (97.7 °F), Max:37.9 °C (100.2 °F)  Temperature: 36.5 °C (97.7 °F)  Pulse  Av.9  Min: 59  Max: 153  Blood Pressure : 149/69       Physical Exam:  Physical Exam   Constitutional: She is oriented to person, place, and time. She appears well-developed and well-nourished. No distress.   HENT:   Head: Normocephalic and atraumatic.   Eyes: Pupils are equal, round, and reactive to light. EOM are normal. Right eye exhibits no discharge. Left eye exhibits no discharge.   Neck: Neck supple.   Cardiovascular: Normal rate and intact distal pulses.    Murmur heard.  Pulmonary/Chest: Effort normal and breath sounds normal. No respiratory distress. She has no wheezes.   Abdominal: Soft. Bowel sounds are normal. She exhibits no distension. There is no tenderness.   Musculoskeletal: She exhibits no edema, tenderness or deformity.   Right upper extremity PICC line   Lymphadenopathy:     She has no cervical adenopathy.   Neurological: She is alert and oriented to person, place, and time.   Moves all extremities   Skin: Skin is warm. No rash noted. She is diaphoretic.   Nursing note and vitals reviewed.    Meds:    Current Facility-Administered Medications:   •  acetaminophen  •   hydrALAZINE  •  ketorolac  •  meropenem  •  haloperidol lactate  •  QUEtiapine  •  gabapentin  •  labetalol  •  loperamide  •  losartan  •  MD Alert...Vancomycin per Pharmacy  •  Notify provider if pain remains uncontrolled **AND** Use the numeric rating scale (NRS-11) on regular floors and Critical-Care Pain Observation Tool (CPOT) on ICUs/Trauma to assess pain **AND** Pulse Ox (Oximetry) **AND** Pharmacy Consult Request **AND** If patient difficult to arouse and/or has respiratory depression, stop any opiates that are currently infusing and call a Rapid Response. **AND** [DISCONTINUED] oxyCODONE immediate-release **AND** [DISCONTINUED] oxyCODONE immediate-release **AND** [DISCONTINUED] morphine injection  •  omeprazole  •  ondansetron  •  ondansetron  •  potassium chloride SA  •  pramipexole  •  Respiratory Care per Protocol  •  vancomycin    Labs:  Recent Labs      04/16/19 0531  04/17/19   0016  04/18/19   0211   WBC  4.7*  4.3*  3.3*   RBC  5.09  4.76  4.51   HEMOGLOBIN  12.7  11.8*  11.1*   HEMATOCRIT  40.2  38.3  36.5*   MCV  79.0*  80.5*  80.9*   MCH  25.0*  24.8*  24.6*   RDW  49.8  51.9*  50.6*   PLATELETCT  131*  127*  84*   MPV  10.6  11.1  10.2   NEUTSPOLYS  67.50  56.20  63.30   LYMPHOCYTES  17.30*  20.10*  13.10*   MONOCYTES  11.60  16.40*  13.80*   EOSINOPHILS  2.10  5.90  8.60*   BASOPHILS  1.10  1.20  0.90     Recent Labs      04/16/19   0531  04/17/19   0015   SODIUM  135  137   POTASSIUM  4.1  4.4   CHLORIDE  103  107   CO2  22  23   GLUCOSE  105*  91   BUN  15  18     Recent Labs      04/16/19   0531  04/17/19   0015   ALBUMIN  3.2  2.7*   TBILIRUBIN  0.8  0.6   ALKPHOSPHAT  263*  198*   TOTPROTEIN  7.7  6.9   ALTSGPT  19  14   ASTSGOT  36  28   CREATININE  0.57  0.64       Imaging:  Ct-needle Bx-muscle Right    Result Date: 3/13/2019  3/11/2019 4:44 PM HISTORY/REASON FOR EXAM:  Right gluteal hypodense lesion. Moderate sedation was administered with continuous monitoring of the patient under  the direct supervision of  Medications: 2 mg of Versed and 200 mcg of fentanyl Total sedation time 60 minutes Procedure: After the informed consent the patient was placed on the CT table on prone position. Localization CT scan was obtained. It demonstrates hypodense area in the right gluteus muscle. Subsequently a 17-gauge needle was advanced into the lesion. 20  mL of pus was aspirated. The materials were sent to the pathology. The patient tolerated the procedure without any immediate competition.     CT-guided aspiration of pus like material in the right gluteal muscle lesion.    Ec-echocardiogram Complete W/o Cont    Result Date: 3/13/2019  Transthoracic Echo Report Echocardiography Laboratory CONCLUSIONS No prior study is available for comparison. Normal left ventricular systolic function. Left ventricular ejection fraction is visually estimated to be 65%. Normal diastolic function. Normal inferior vena cava size and inspiratory collapse. Aortic sclerosis without stenosis. Estimated right ventricular systolic pressure  is 33 mmHg. No obvious valvular vegetations are noted on a decent quality study.  If further valvular assessment is clinically indicated, consider transesophageal echocardiogram. SAM,  LV EF:  65    % FINDINGS Left Ventricle Normal left ventricular size and systolic function. Normal left ventricular wall thickness. Left ventricular ejection fraction is visually estimated to be 65%. Normal diastolic function. Right Ventricle Normal right ventricular size and systolic function. Right Atrium Normal right atrial size. Normal inferior vena cava size and inspiratory collapse. Left Atrium Normal left atrial size. Mitral Valve Structurally normal mitral valve without significant stenosis or regurgitation. Aortic Valve Aortic sclerosis without stenosis. No aortic insufficiency. Tricuspid Valve Structurally normal tricuspid valve. Mild tricuspid regurgitation. Right atrial pressure is  estimated to be 3 mmHg. Estimated right ventricular systolic pressure  is 33 mmHg. Pulmonic Valve The pulmonic valve is not well visualized. No pulmonic insufficiency. Pericardium Normal pericardium without effusion. Aorta The aortic root is normal.  Ascending aorta diameter is 3.0 cm. Claire Tripathi MD (Electronically Signed) Final Date:     13 March 2019                 14:38      Micro:  Results     Procedure Component Value Units Date/Time    URINALYSIS [[id number]]  (Abnormal) Collected:  04/12/19 1148    Order Status:  Completed Specimen:  Urine from Urine, Cath Updated:  04/12/19 1246     Color Yellow     Character Clear     Specific Gravity 1.011     Ph 7.0     Glucose Negative mg/dL      Ketones Negative mg/dL      Protein 30 (A) mg/dL      Bilirubin Negative     Urobilinogen, Urine 0.2     Nitrite Negative     Leukocyte Esterase Trace (A)     Occult Blood Negative     Micro Urine Req Microscopic    Narrative:       Collected By:[id number] MAVIS URIBE          Assessment:  Active Hospital Problems    Diagnosis   • *Lesion of brain [G93.9]   • Mass of iliopsoas muscle group [M62.89]   • Encephalopathy [G93.40]   •    • History of breast cancer [Z85.3]   • Diabetes mellitus type 2 in obese (HCC) [E11.69, E66.9]       Plan:  Multiple brain lesions-likely abscesses versus metastatic lesions  Leukopenia  Confusion improving  MRI with scattered small lesions incl aaron, too small to biopsy per neurosurgery  TTE neg; CHAS neg  Bcxs neg to date  Repeat CT scan on 3/30/2019 shows decrease in the size of the brain lesions, supporting that these are abscesses  Repeat MRI brain prior to completion of abx    Iliopsoas abscess, on treatment  CT + 2.6 cm lesion in the right iliopsoas region  S/p aspiration +WBCs-cultures negative to date  Bcxs remain neg  Last vanc trough of 20.4 and 4/5  D/c cefepime 03/26 - may be contributing to confusion  Tolerating Meropenem started 03/26  MRI w/ contrast lumbar, pelvis 03/27 - Iliopsoas  and gluteal fluid collections noted.    Drain placed 3/29/2019  Cultures are negative so far  Repeat CT scan shows resolution of the abscess in the gluteus but increase in the iliopsoas  Another drain was placed on 4/4/2019 and it shows purulent fluid  Repeat CT from 4/8 with resolution of the fluid collections after drainage per my read  Aim  for at least 6 weeks of IV antibiotics (end date: 4/24/2018) likely followed by p.o. 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  I spoke to orthopedics about logistics of taking the hardware out.  As per orthopedics this hardware needs to be in for 6 months.  H/o breast cancer  Markedly elevated alk phos, trending down  Continued diaz for mets    Type 2 diabetes mellitus  Hemoglobin A1c 6.3  Keep BS under 150 to help control current infection    Altered mental status  Appears to be waxing and waning  Multifactorial, likely large contribution from her multiple brain abscesses, monitor  The CT scan done on 4/13/2019 shows persistence of the lesions    I have performed a physical exam and reviewed and updated ROS and plan today 4/18/2019.  In review of yesterday's note 4/17/2019, there are no changes except as documented above.    Continue current plan as outlined above    Discussed with IM

## 2019-04-18 NOTE — PALLIATIVE CARE
Palliative Care follow-up  PC RN met with DPOA/brother René at  and discussed current situation, goals of care and his desires for his sister. He states that having her in [place] would be most beneficial for her since family will be present and her insurance coverage would be more helpful once she had [employer]. PC RN discussed the role of hospice given concern for metastatic disease but he is concerned without having a concrete diagnosis. Arsh states that the patient's income is about $800 with some of that going towards insurance so she does not have the financial means to pay for transport to [place] on her own. He states that he helps care for his SO who is on dialysis and while he is very willing to help with his sister's situation, he can not care for her in his home. He is more than able/willing to find her a safe place to live, etc., near he or their brother in the [place]. René denied any other questions/needs at this time.    Case discussed with SHABBIR Mares and Lisa, as well as SW leadership. Plan made to send referrals locally, including LTAC review and go from there. Per EMR review, several referrals sent to [place] SNFs but not many responses despite f/u by SW team.       Plan: assist with DC plan as able    Thank you for allowing Palliative Care to participate in this patient's care. Please feel free to call [phone] with any questions or concerns.

## 2019-04-18 NOTE — DISCHARGE PLANNING
Anticipated Discharge Disposition: SNF    Action: LCSW provided a choice form for SNF(s) in [facility] to pt's brother René. René chose [facility], [facility], [facility], and [facility].    Barriers to Discharge: SNF acceptance    Plan: LCSW will continue to follow and assess for further needs.

## 2019-04-18 NOTE — PROGRESS NOTES
· 2 RN skin check complete with PATRICK Clayton.  · Devices in place Oxygen tubing, SCD.  · Skin assessed under devices Completed.  · Confirmed pressure ulcers found on N/A.  · New potential pressure ulcers noted on N/A. Wound consult placed and wound reported.  · The following interventions in place q2 turns, waffle mattress, torrey cream, silicone nasal cannula, heels floated, pillow under right elbow    Ears pink and blanching bilaterally   Right elbow pink and boggy, floated on pillow  Bottom skin warm and moist, torrey cream applied due to incontinence, no sign on IAD or skin break down  Heels boggy bilaterally, floated  Feet bilaterally dry   All other skin intact

## 2019-04-18 NOTE — PROGRESS NOTES
Assumed care of patient, bedside report received from Willy. Updated on POC, call light within reach and fall precautions in place. Bed locked and in lowest position. Patient instructed to call for assistance before getting out of bed. All questions answered, no other needs at this time. Patient stating she feels hot, fever of 100.2. No PRN tylenolLisa paged. Cold towel and cold water given to patient

## 2019-04-18 NOTE — PROGRESS NOTES
"[facility] Medicine Daily Progress Note    Date of Service  4/17/2019    Chief Complaint  68 y.o. female admitted 3/8/2019 with right hip pain, fever and abnormal brain mri.    Hospital Course    Patient started on empiric antibiotics given fever.  She had abnormal findings on MRI brain and CT showed \"lesion\" on right iliopsoas.  This lesion was biopsied and turned out to be abscess.  She has history of breast cancer and there is also concern of brain lesions being metastatic though their appearance is not typical of this.      Interval Problem Update  3/12 Discussed with Dr Turner for second opinion of brain lesions and based on appearance not able to r/o or r/i any diagnosis.  Given patient's presentation of confusion, hip pain and fever  - this is more supportive of infectious etiology rather than malignant.  Fluid from right gluteal area sent to micro and as of yet - no growth.  Continue zosyn for now.  3/13 Patient with slight improvement in mentation.  Blood cultures and abscess cultures are showing no growth at this time.  TTE being done while I examined patient - no evidence of vegetations on this test.  ID consultation pending - d/w Dr Garcia.  3/14 Patient feeling well today, her pain is present but not as bad as prior to admission.  She was able to follow the conversation today and is agreeable to move forward with the CHAS tomorrow.  I spoke with her brother, René, and updated him on condition yesterday afternoon also.  Will also have PICC placed in next few days as long as blood cultures remain negative as I expect a prolonged antibiotic course of treatment.  3/15 Patient without new complaints, states she needs help to move in bed.  CHAS showed no evidence of vegetations and regular diet resumed.  Culture remains negative and biopsy of brain lesion may prove needed - will watch through the weekend and if mentation does not continue to improve, will discuss with neurosurgery on Monday.  3/16 Patient states " she is okay today, mentation has waxed and waned without significant improvement.  She was febrile earlier today.  No other acute events.  3/17 Patient with significant improvement in mentation today, she is aware of her hospitalization, not falling asleep mid sentence.  Her pain mediations were held most of yesterday and likely far too strong for patient and were affecting her awareness.  Oxycodone 5 discontinued and will use tramadol instead unless pain not well controlled.  CT brain with contrast ordered as a quick scan for comparison to MRI.    3/18 Patient feeling same today, discussed need to discuss with neurosurgery for possible biopsy.  Discussed with Dr Nguyen, he reviewed MRI and CT brain and he feels they are likely metastatic lesions but are far too small to biopsy.  His recommendation is to continue with antibiotics and re-image in 2 weeks to see if any change that would be amenable to biopsy or that would further declare infection.  3/26 Patient mental status continues to wax and wane.  ID ordered MRI lumbar, pelvis and sacrum for evaluation for source of infection, patient with continued low back pain and no real improvement since I saw her 7 days prior with continued antibiotics during this time.  Cultures have not growth pathogen, repeat MRI 3/28.   3/27 Patient somnolent most of the day today, MRI's completed and showing 2 areas of fluid collections, given her history are likely abscesses and will require drainage, CT guided drainage requested and patient NPO at MN for this.  D/w ID - cultures will be done on fluid collection.  Repeat CT brain ordered for tomorrow.  3/28 Patient up to chair, diet resumed as lovenox given this am and drainage of abscesses deferred until tomorrow.  Vanco/merrem to continue and cultures will be collected from abscesses.  3/29 Patient went to IR today, had drain placed in the right buttock into abscess cavity and fluid sent for culture.  Potassium is slightly low,  continue with PO replacement.  HR has improved from yesterday but patient PO intake still not at normal level, increase IVF rate to 100cc/hour.  3/30 Patient without fever since drain placed right buttock.  Purulent material in ÁNGEL bulb. Cultures thus far are still showing no growth.  Antibiotics to continue.  CT head ordered to evaluate change in past 2 weeks as recommended by neurosurgery.  3/31 Patient with fevers overnight - was altered during this time but has recovered.  She denies pain when examined by me.  She still has purulent material in the drainage tubing.  Will continue with current IV antibiotics - 6 weeks total antibiotics suspected - end date would be 4/24/19.  D/w ID.  4/1 Patient remains intermittently somnolent.  She wakes easily but falls back to sleep quickly.  She remained afebrile overnight.  ÁNGEL drain continues to drain purulent material.  ABX through 4/24 - once accepting facility found, patient okay to transfer with midline intact for completion of antibiotics.    4/9 Patient somnolent when examined.  CT showed resolution of fluid collection where ÁNGEL in place.  RN to remove ÁNGEL today.  All cultures still remain without growth.  4/10 Patient seen when working with OT, she is awake but gets off task quickly and is easily fatigued.  ÁNGEL drain removed yesterday.  Patient states she has pain in the lumbar spine when she coughs - just above her surgical area.  She has been in bed for nearly 1 month and this is likely not helping her back pain - educated need for OOB as often as tolerated.  4/11 Patient had bowel incontinence prior to my entering room, states she thinks she needs a bed pan.  CNA notified.  She continues to have low back pain in the area of her surgery and given her bed-bound status during her infection and intermittent confusion, this is not unexpected.  Continuing with therapy recommended and patient is 1:1 feeding given her weakness to feed herself.  4/12 Patient tearful this am,  she is concerned that she will not walk again, support given and educated that she is weak and the inability to walk is only temporary.  She needs to work with therapy and get stronger and I need to stop her narcotics and only give tyelnol/ibuprofen for pain management along with non-narcotic alternatives.  I spoke to patient's brother, René, at her request.  4/13 Patient up to chair when evaluated, mentation improved with discontinuation of narcotics. Brother is coming to visit this weekend and she is looking forward to this.  Ortho recommendations still pending - per ID she spoke to Dr Porter who is consulting Dr Sandra for recommendations.  4/14 Patient up to chair with assistance today.  CT brain remains same as prior 2 weeks ago.  Still pending ortho consultation.  4/15 Patient to be seen by ortho today per Dr Porter.  D/w dietary - patient with weight loss and poor po intake, she needs to be a 1:1 feed patient and if she fails this, will likely need tube feeding to get adequate nutrition.  4/16: Lethargic.  Opens eyes to physical stimulus but drifts quickly back to sleep.  Has been tachycardic with soft blood pressure in the morning.  Lasix held.  Palliative consulted.  4/17: Awake and alert this morning.  Interactive.  Follows simple commands and answer simple questions.  Disoriented to time.  No distress noted.  No issues overnight.  Consultants/Specialty  ID - Sarah/Ashish  Ortho -   Palliative  Code Status  full    Disposition  SNF/LTAC in [place]    Review of Systems  Review of Systems   Unable to perform ROS: Mental acuity   Musculoskeletal: Back pain: lumbar/sacral area.        Physical Exam  Temp:  [36.3 °C (97.3 °F)-37.1 °C (98.8 °F)] 36.6 °C (97.9 °F)  Pulse:  [] 100  Resp:  [15-18] 15  BP: (106-134)/(56-88) 134/57  SpO2:  [96 %-100 %] 98 %    Physical Exam   Constitutional: No distress.   HENT:   Head: Normocephalic and atraumatic.   Eyes: Pupils are equal, round, and reactive to  light. Conjunctivae are normal. Left eye exhibits no discharge. No scleral icterus.   Neck: Neck supple. No thyromegaly present.   Cardiovascular: Normal rate, regular rhythm and normal heart sounds.  Exam reveals no gallop and no friction rub.    Pulmonary/Chest: Effort normal and breath sounds normal. No respiratory distress.   Abdominal: Soft. Bowel sounds are normal. She exhibits no distension. There is no tenderness.   Musculoskeletal: She exhibits no tenderness or deformity.   ÁNGEL drain removed       Neurological: She is alert. She is disoriented. No cranial nerve deficit.        Skin: Skin is warm. She is not diaphoretic. No erythema.   Psychiatric: She has a normal mood and affect.   Nursing note and vitals reviewed.      Fluids    Intake/Output Summary (Last 24 hours) at 04/17/19 1858  Last data filed at 04/17/19 1126   Gross per 24 hour   Intake              480 ml   Output                0 ml   Net              480 ml       Laboratory  Recent Labs      04/15/19   0007  04/16/19   0531  04/17/19   0016   WBC  5.5  4.7*  4.3*   RBC  5.06  5.09  4.76   HEMOGLOBIN  12.4  12.7  11.8*   HEMATOCRIT  40.8  40.2  38.3   MCV  80.6*  79.0*  80.5*   MCH  24.5*  25.0*  24.8*   MCHC  30.4*  31.6*  30.8*   RDW  51.1*  49.8  51.9*   PLATELETCT  143*  131*  127*   MPV  11.1  10.6  11.1     Recent Labs      04/15/19   0007  04/16/19   0531  04/17/19   0015   SODIUM  134*  135  137   POTASSIUM  5.0  4.1  4.4   CHLORIDE  106  103  107   CO2  22  22  23   GLUCOSE  88  105*  91   BUN  15  15  18   CREATININE  0.58  0.57  0.64   CALCIUM  10.7*  10.9*  10.6*                   Imaging  CT-CTA CHEST PULMONARY ARTERY W/ RECONS   Final Result         1.  No large central pulmonary embolus is appreciated, evaluation of the subsegmental branches is essentially nondiagnostic due to motion artifacts. Additional imaging would be required for definitive exclusion of small distal pulmonary emboli.   2.  Trace pericardial effusion   3.   Hepatomegaly   4.  Atherosclerosis.   5.  Right pulmonary nodules, the largest is a 7.7 mm pulmonary nodule, see nodule follow-up recommendations below.      Low Risk: CT at 3-6 months, then consider CT at 18-24 months      High Risk: CT at 3-6 months, then at 18-24 months      Comments: Use most suspicious nodule as guide to management. Follow-up intervals may vary according to size and risk.      Low Risk - Minimal or absent history of smoking and of other known risk factors.      High Risk - History of smoking or of other known risk factors.      Note: These recommendations do not apply to lung cancer screening, patients with immunosuppression, or patients with known primary cancer.      Fleischner Society 2017 Guidelines for Management of Incidentally Detected Pulmonary Nodules in Adults         DX-CHEST-PORTABLE (1 VIEW)   Final Result         1.  Mild pulmonary edema and/or infiltrate   2.  Cardiomegaly      CT-HEAD WITH & W/O   Final Result      1.  No change in scattered hyperenhancing lesions throughout the brain, cerebellum and visualized brainstem. Some of them demonstrate mild associated edema, similar to prior study.   2.  No CT evidence of infarct or hemorrhage.      CT-NEEDLE BX-MUSCLE RIGHT   Final Result   Addendum 1 of 1   Addendum:   The biopsy/drainage was done utilizing low dose optimization CT techniques    including auto modulation for  imaging and low mA CT/fluoroscopy mode    for the procedure.      Final      CT-guided aspiration of pus like material in the right gluteal muscle lesion.      CT-ABDOMEN-PELVIS WITH   Final Result      1.  Resolution of right iliacus abscess with no associated fluid adjacent to the drainage catheter      2.  Interval removal of right gluteal drainage catheter      3.  Surgical screw traversing both sacroiliac joint with associated pathologic fracture of the right ilium and right medial sacrum      4.  Hepatomegaly      CT-DRAIN-HEMATOMA - SEROMA   Final  Result      1.  CT-GUIDED RETROPERITONEAL (EXTRAPERITONEAL) RIGHT PELVIC ABSCESS DRAINAGE AT THE RIGHT ILIOPSOAS. ORDERS WERE WRITTEN FOR ONCE DAILY IRRIGATION OF THE CATHETER WITH 5 ML STERILE SALINE.      2.  THE CURRENT PLAN IS TO MONITOR DRAINAGE OUTPUT AND OBTAIN A FOLLOWUP CT SCAN IN 5-7 DAYS IF CLINICALLY INDICATED.      CT-ABDOMEN-PELVIS WITH   Final Result      1.  Rim-enhancing fluid collection in the right iliopsoas muscle may have increased in size slightly from the prior exam.      2.  Pigtail catheter in the right gluteus medius muscle. No residual fluid collection is appreciated.      3.  Pathologic fracture of the right ilium. Status post right SI joint fusion.      4.  Atherosclerosis.      5.  Cholelithiasis.      CT-HEAD WITH & W/O   Final Result      1.  Interval decrease in size and level of enhancement of multiple small punctate foci in both cerebral hemispheres and in the midbrain consistent with improving septic emboli.      2.  No hemorrhage or mass effect.      CT-DRAIN-RETROPERITONEAL   Final Result      1.  CT GUIDED CATHETER DRAINAGE OF RIGHT GLUTEAL ABSCESS.   2.  THE CURRENT PLAN IS TO OBTAIN A FOLLOW-UP CT SCAN IN 5-7 DAYS IF INDICATED. ORDERS WERE WRITTEN FOR ONCE DAILY IRRIGATION OF THE CATHETER WITH 5 ML STERILE SALINE FOR 3 DAYS.   3. THE ILIOPSOAS COLLECTION WAS NOT AMENABLE TO CATHETER DRAINAGE AS INTERVENING BOWEL AND/OR BONY STRUCTURES OBSTRUCT A PATHWAY TO THIS COLLECTION AT THIS TIME.      MR-LUMBAR SPINE-WITH & W/O   Final Result      1.  There are multifocal enhancing fluid collections in the right iliopsoas muscles and gluteus muscles adjacent to the right ilium. Right iliopsoas fluid collection measures an approximately 3.9 x 2.9 cm. The right gluteal fluid collection measures an    approximately 3.5 x 2.8 cm in size. The differential diagnosis includes postsurgical seroma and abscess.   2.  Post operative and degenerative changes as described above.      MR-PELVIS-WITH  & W/O AND SEQUENCES   Final Result      1.  Surgical screw traverses both sacroiliac joint across presumed pathologic fracture of the right sacrum and the hardware obscures the adjacent tissues.      2.  No other evidence for bony metastatic disease      3.  Right gluteal musculature rim-enhancing fluid collection measuring 3.4 x 2.8 cm. This could be a postoperative seroma versus abscess      4.  osteoarthritis of both hip joint      5.  Prior hysterectomy      IR-MIDLINE CATHETER INSERTION WO GUIDANCE > AGE 3   Final Result                  Ultrasound-guided midline placement performed by qualified nursing staff    as above.          CT-HEAD WITH   Final Result      Multiple subcentimeter too numerous to count enhancing masses scattered throughout the supratentorial and infratentorial brain. These demonstrate some surrounding vasogenic edema and most likely represent multifocal metastatic lesions. Other    considerations would include multifocal abscesses or septic emboli.      EC-CHAS W/O CONT   Final Result      EC-CHAS W/O CONT   Final Result      EC-ECHOCARDIOGRAM COMPLETE W/O CONT   Final Result      OUTSIDE IMAGES-CT CHEST/ABDOMEN/PELVIS   Final Result      OUTSIDE IMAGES-DX CHEST   Final Result      [id number] FILM X-RAY   Final Result      OUTSIDE IMAGES-MR BRAIN   Final Result      OUTSIDE IMAGES-MR BRAIN   Final Result           Assessment/Plan  * Lesion of brain   Assessment & Plan    Metastatic cancer vs infectious etiology  D/w Dr Black for questionable brain mets, recommending IR Bx of psoas muscle   IR Bx of psoas muscle ordered - was abscess  Empiric treatment of infection  TTE and CHAS negative for vegetations.  D/w Dr Nguyen - lesions likely metastatic based on his interpretation of MRI/CT - too small to biopsy, recommends continuing antibiotics and re-imaging in 2 weeks to see if there has been any change.  CT brain to be repeated - lesions same as prior 2 weeks ago.  Will likely need  follow-up CT scan of the brain to assess effectiveness of antibiotics.  Continue with vancomycin and Merrem.  Cultures has been negative so far.  Monitor kidney functions closely and monitor vancomycin trough with a goal between 15 and 20.  Palliative consulted.       Encephalopathy   Assessment & Plan    Waxes and wanes.  Likely secondary to multiple brain lesions.  Improved with dc of narcotics and using scheduled tyelnol for pain.         Mass of iliopsoas muscle group   Assessment & Plan    Return of fluid collection, repeat drainage in IR with cultures, drain placed 3/29 - 10ml purulent material drained and sent for culture - no growth a/o 3/30  Was on linezolid and cefepime and now on vanco/merrem - end date to be 6 weeks of treatment. (4/24/19)  ID following.  Repeat CT abdomen shows resolution of fluid collection and ÁNGEL output dwindling - removed ÁNGEL drain  Cultures are negative so far.  CHAS negative for endocarditis.  Patient also has right hip ORIF.  Orthopedic surgery recommended hardware to stay for at least 6 months to promote adequate healing and to continue with antibiotics for now.         Normocytic anemia   Assessment & Plan    stable     Hyponatremia- (present on admission)   Assessment & Plan    Mildly low - doubt contribution to mentation         RLS (restless legs syndrome)- (present on admission)   Assessment & Plan    Pramipexole       Diabetes mellitus type 2 in obese (HCC)- (present on admission)   Assessment & Plan    Well controlled   Short acting insulin as needed   Accu-Checks, hypoglycemia protocol          History of breast cancer- (present on admission)   Assessment & Plan    infection to brain  No known active breast cancer.       COPD (chronic obstructive pulmonary disease) (HCC)- (present on admission)   Assessment & Plan    Oxygen as needed, Respiratory protocol, Bronchodilators, Incentive spirometry      Sinus tachycardia- (present on admission)   Assessment & Plan    Likely due  to combination of infection and volume loss.  Echocardiogram with normal ejection fraction and no significant valvular abnormalities.  Check TSH.  Continue to monitor.     Urinary tract infection   Assessment & Plan    Culture remain no growth.       Fungal infection of the groin   Assessment & Plan    on nystatin powder 3/24     History of deep vein thrombosis- (present on admission)   Assessment & Plan    History of deep vein thrombosis   Was on Rivaroxaban as outpatient.     Held for Bx Mar 11  Restart AC after completing abx treatment         Essential hypertension- (present on admission)   Assessment & Plan    Restarted on losartan and furosemide with hold parameters.     Fever   Assessment & Plan    PRN Tylenol.     GERD (gastroesophageal reflux disease)- (present on admission)   Assessment & Plan    Omeprazole     Chronic pain- (present on admission)   Assessment & Plan     Multifactorial  Likely secondary to abscess, fracture, bed bound status  Pain control       Plan of care discussed with multidisciplinary team during rounds.    VTE prophylaxis: scds

## 2019-04-18 NOTE — DISCHARGE PLANNING
Received Choice form at 1425  Agency/Facility Name: [place], [facility], [facility]  Referral sent per Choice form at 4621

## 2019-04-19 LAB
ALBUMIN SERPL BCP-MCNC: 2.9 G/DL (ref 3.2–4.9)
ALBUMIN/GLOB SERPL: 0.6 G/DL
ALP SERPL-CCNC: 269 U/L (ref 30–99)
ALT SERPL-CCNC: 15 U/L (ref 2–50)
ANION GAP SERPL CALC-SCNC: 7 MMOL/L (ref 0–11.9)
ANISOCYTOSIS BLD QL SMEAR: ABNORMAL
AST SERPL-CCNC: 39 U/L (ref 12–45)
BASOPHILS # BLD AUTO: 0.9 % (ref 0–1.8)
BASOPHILS # BLD: 0.03 K/UL (ref 0–0.12)
BILIRUB SERPL-MCNC: 0.5 MG/DL (ref 0.1–1.5)
BUN SERPL-MCNC: 19 MG/DL (ref 8–22)
CALCIUM SERPL-MCNC: 10.6 MG/DL (ref 8.5–10.5)
CHLORIDE SERPL-SCNC: 104 MMOL/L (ref 96–112)
CO2 SERPL-SCNC: 22 MMOL/L (ref 20–33)
CREAT SERPL-MCNC: 0.68 MG/DL (ref 0.5–1.4)
EOSINOPHIL # BLD AUTO: 0.25 K/UL (ref 0–0.51)
EOSINOPHIL NFR BLD: 7.6 % (ref 0–6.9)
ERYTHROCYTE [DISTWIDTH] IN BLOOD BY AUTOMATED COUNT: 50.9 FL (ref 35.9–50)
GLOBULIN SER CALC-MCNC: 4.5 G/DL (ref 1.9–3.5)
GLUCOSE SERPL-MCNC: 78 MG/DL (ref 65–99)
HCT VFR BLD AUTO: 42.4 % (ref 37–47)
HGB BLD-MCNC: 12.6 G/DL (ref 12–16)
HGB RETIC QN AUTO: 29.3 PG/CELL (ref 29–35)
IMM RETICS NFR: 16.9 % (ref 9.3–17.4)
IRON SATN MFR SERPL: 8 % (ref 15–55)
IRON SERPL-MCNC: 30 UG/DL (ref 40–170)
LG PLATELETS BLD QL SMEAR: NORMAL
LYMPHOCYTES # BLD AUTO: 0.45 K/UL (ref 1–4.8)
LYMPHOCYTES NFR BLD: 13.6 % (ref 22–41)
MANUAL DIFF BLD: NORMAL
MCH RBC QN AUTO: 24.1 PG (ref 27–33)
MCHC RBC AUTO-ENTMCNC: 29.7 G/DL (ref 33.6–35)
MCV RBC AUTO: 81.2 FL (ref 81.4–97.8)
METAMYELOCYTES NFR BLD MANUAL: 0.8 %
MONOCYTES # BLD AUTO: 0.03 K/UL (ref 0–0.85)
MONOCYTES NFR BLD AUTO: 0.8 % (ref 0–13.4)
MORPHOLOGY BLD-IMP: NORMAL
NEUTROPHILS # BLD AUTO: 2.52 K/UL (ref 2–7.15)
NEUTROPHILS NFR BLD: 72.9 % (ref 44–72)
NEUTS BAND NFR BLD MANUAL: 3.4 % (ref 0–10)
NRBC # BLD AUTO: 0 K/UL
NRBC BLD-RTO: 0 /100 WBC
PLATELET # BLD AUTO: 100 K/UL (ref 164–446)
PLATELET BLD QL SMEAR: NORMAL
POTASSIUM SERPL-SCNC: 4.9 MMOL/L (ref 3.6–5.5)
PROT SERPL-MCNC: 7.4 G/DL (ref 6–8.2)
RBC # BLD AUTO: 5.22 M/UL (ref 4.2–5.4)
RBC BLD AUTO: PRESENT
RETICS # AUTO: 0.1 M/UL (ref 0.04–0.06)
RETICS/RBC NFR: 1.9 % (ref 0.8–2.1)
SMUDGE CELLS BLD QL SMEAR: NORMAL
SODIUM SERPL-SCNC: 133 MMOL/L (ref 135–145)
TIBC SERPL-MCNC: 382 UG/DL (ref 250–450)
VANCOMYCIN TROUGH SERPL-MCNC: 18.5 UG/ML (ref 10–20)
WBC # BLD AUTO: 3.3 K/UL (ref 4.8–10.8)

## 2019-04-19 PROCEDURE — 85007 BL SMEAR W/DIFF WBC COUNT: CPT

## 2019-04-19 PROCEDURE — 700105 HCHG RX REV CODE 258: Performed by: INTERNAL MEDICINE

## 2019-04-19 PROCEDURE — 83550 IRON BINDING TEST: CPT

## 2019-04-19 PROCEDURE — 99233 SBSQ HOSP IP/OBS HIGH 50: CPT | Performed by: FAMILY MEDICINE

## 2019-04-19 PROCEDURE — A9270 NON-COVERED ITEM OR SERVICE: HCPCS | Performed by: FAMILY MEDICINE

## 2019-04-19 PROCEDURE — 80202 ASSAY OF VANCOMYCIN: CPT

## 2019-04-19 PROCEDURE — 700111 HCHG RX REV CODE 636 W/ 250 OVERRIDE (IP): Performed by: INTERNAL MEDICINE

## 2019-04-19 PROCEDURE — 700102 HCHG RX REV CODE 250 W/ 637 OVERRIDE(OP): Performed by: INTERNAL MEDICINE

## 2019-04-19 PROCEDURE — 700111 HCHG RX REV CODE 636 W/ 250 OVERRIDE (IP): Performed by: HOSPITALIST

## 2019-04-19 PROCEDURE — 99233 SBSQ HOSP IP/OBS HIGH 50: CPT | Performed by: INTERNAL MEDICINE

## 2019-04-19 PROCEDURE — 83540 ASSAY OF IRON: CPT

## 2019-04-19 PROCEDURE — A9270 NON-COVERED ITEM OR SERVICE: HCPCS | Performed by: INTERNAL MEDICINE

## 2019-04-19 PROCEDURE — 97530 THERAPEUTIC ACTIVITIES: CPT

## 2019-04-19 PROCEDURE — 85046 RETICYTE/HGB CONCENTRATE: CPT

## 2019-04-19 PROCEDURE — 700102 HCHG RX REV CODE 250 W/ 637 OVERRIDE(OP): Performed by: FAMILY MEDICINE

## 2019-04-19 PROCEDURE — 85027 COMPLETE CBC AUTOMATED: CPT

## 2019-04-19 PROCEDURE — 700111 HCHG RX REV CODE 636 W/ 250 OVERRIDE (IP): Performed by: FAMILY MEDICINE

## 2019-04-19 PROCEDURE — 770020 HCHG ROOM/CARE - TELE (206)

## 2019-04-19 PROCEDURE — 97116 GAIT TRAINING THERAPY: CPT

## 2019-04-19 PROCEDURE — 80053 COMPREHEN METABOLIC PANEL: CPT

## 2019-04-19 PROCEDURE — 36415 COLL VENOUS BLD VENIPUNCTURE: CPT

## 2019-04-19 RX ORDER — FLUCONAZOLE 100 MG/1
200 TABLET ORAL DAILY
Status: COMPLETED | OUTPATIENT
Start: 2019-04-19 | End: 2019-04-23

## 2019-04-19 RX ADMIN — POTASSIUM CHLORIDE 40 MEQ: 1500 TABLET, EXTENDED RELEASE ORAL at 06:08

## 2019-04-19 RX ADMIN — MEROPENEM 2 G: 1 INJECTION, POWDER, FOR SOLUTION INTRAVENOUS at 16:31

## 2019-04-19 RX ADMIN — GABAPENTIN 300 MG: 300 CAPSULE ORAL at 06:08

## 2019-04-19 RX ADMIN — HYDRALAZINE HYDROCHLORIDE 10 MG: 20 INJECTION INTRAMUSCULAR; INTRAVENOUS at 16:30

## 2019-04-19 RX ADMIN — KETOROLAC TROMETHAMINE 15 MG: 30 INJECTION, SOLUTION INTRAMUSCULAR at 21:21

## 2019-04-19 RX ADMIN — QUETIAPINE FUMARATE 25 MG: 25 TABLET ORAL at 18:00

## 2019-04-19 RX ADMIN — LOSARTAN POTASSIUM 50 MG: 50 TABLET ORAL at 06:08

## 2019-04-19 RX ADMIN — ACETAMINOPHEN 650 MG: 325 TABLET, FILM COATED ORAL at 16:30

## 2019-04-19 RX ADMIN — FLUCONAZOLE 200 MG: 100 TABLET ORAL at 16:30

## 2019-04-19 RX ADMIN — GABAPENTIN 300 MG: 300 CAPSULE ORAL at 16:30

## 2019-04-19 RX ADMIN — VANCOMYCIN HYDROCHLORIDE 600 MG: 100 INJECTION, POWDER, LYOPHILIZED, FOR SOLUTION INTRAVENOUS at 21:23

## 2019-04-19 RX ADMIN — ACETAMINOPHEN 650 MG: 325 TABLET, FILM COATED ORAL at 06:08

## 2019-04-19 RX ADMIN — HALOPERIDOL LACTATE 1 MG: 5 INJECTION, SOLUTION INTRAMUSCULAR at 17:23

## 2019-04-19 RX ADMIN — OMEPRAZOLE 20 MG: 20 CAPSULE, DELAYED RELEASE ORAL at 06:08

## 2019-04-19 RX ADMIN — POTASSIUM CHLORIDE 40 MEQ: 1500 TABLET, EXTENDED RELEASE ORAL at 16:30

## 2019-04-19 RX ADMIN — MEROPENEM 2 G: 1 INJECTION, POWDER, FOR SOLUTION INTRAVENOUS at 08:03

## 2019-04-19 ASSESSMENT — COGNITIVE AND FUNCTIONAL STATUS - GENERAL
MOBILITY SCORE: 12
WALKING IN HOSPITAL ROOM: A LOT
SUGGESTED CMS G CODE MODIFIER MOBILITY: CL
MOVING TO AND FROM BED TO CHAIR: A LOT
STANDING UP FROM CHAIR USING ARMS: A LOT
CLIMB 3 TO 5 STEPS WITH RAILING: TOTAL
MOVING FROM LYING ON BACK TO SITTING ON SIDE OF FLAT BED: UNABLE

## 2019-04-19 ASSESSMENT — ENCOUNTER SYMPTOMS
FEVER: 1
MYALGIAS: 0
SHORTNESS OF BREATH: 0
DIAPHORESIS: 1
SENSORY CHANGE: 0
COUGH: 0
BACK PAIN: 1
VOMITING: 0
SPEECH CHANGE: 0
NAUSEA: 0

## 2019-04-19 ASSESSMENT — GAIT ASSESSMENTS
ASSISTIVE DEVICE: FRONT WHEEL WALKER
GAIT LEVEL OF ASSIST: MODERATE ASSIST
DISTANCE (FEET): 10
DEVIATION: DECREASED TOE OFF;DECREASED HEEL STRIKE

## 2019-04-19 NOTE — DISCHARGE PLANNING
Anticipated Discharge Disposition: SNF    Action: LCSW spoke with Thea at [facility]. Thea informed LCSW that she poke with pt's brother René about d/c plan after SNF. René confirmed that he will be attempting to find a Long Term Facility or Group home for the pt when she is d/c from an accepting SNF.    Barriers to Discharge: SNF acceptance    Plan: LCSW will continue to follow and assess for further needs.

## 2019-04-19 NOTE — CARE PLAN
Problem: Nutritional:  Goal: Achieve adequate nutritional intake  Patient will consume >50% of meals   Outcome: PROGRESSING AS EXPECTED  PO intake generally <25% plus % Boost TID. Spoke with RN who said pt declined milkshake (AM snack) today, but eating about 50% of meals plus Boost. Requested good documentation for PO intake today. RD following.

## 2019-04-19 NOTE — PROGRESS NOTES
Infectious Disease Progress Note    Author: Beatriz Johnson M.D. Date & Time of service: 4/19/2019  8:17 AM    Chief Complaint:  Multiple brain lesions  Iliopsoas lesion    Interval History:  68-year-old female with history of diabetes and breast cancer, admitted 3/8/2019 with abdominal pain, iliopsoas lesion, and an abnormal MRI with multiple brain lesions.  4/1/2019 no fevers.  Easily arousable.  ÁNGEL continues to drain.  4/2/2019-no fevers.  Awake and responsive but feels weak.  There is minimal drainage from the ÁNGEL.  As per the nursing the patient is incontinent of stool.  But when you talk to her she states she is just is not able to get up on time.  4/3/2019-complains of some weakness in the hand. No fevers. The drain is not draining much  4/4- no fevers. She is anxious since she has been NPO. Awaiting IR drain  4/5 no fevers.  Got her drain.  There was purulent fluid.  WBCs 4.6.  4/8 afebrile, white count 3.3.  IR drain remains in place.  Continues to have significant word finding difficulty.  4/9 per RN, patient more awake and interactive but remains confused with word finding difficulty.  Resting comfortably this morning  4/10 afebrile, white count 4.6.  Patient much more awake and interactive today.  Reports no new issues, reports no pain anywhere.  IR drain has been removed.  4/11 afebrile WBC 4.2 patient complains of pain in her legs and feet.  She is being assisted with feeding.  Denies any headaches or neck pain  4/12/2019 no fevers.  Patient is alert and oriented.  Although really slow.  Apparently when she gets pain medications she becomes confused.  But this morning she is answering all questions appropriately.  4/13/2019 patient is somewhat confused.  No fevers.  No new issues overnight.  4/14/2019 no fevers.  Somewhat confused.  WBC is 4.  4/15/2019 no fevers.  Complains of back pain and joint pain.  WBCs 5.5  4/16/2019 patient was transferred to telemetry overnight.  Much more  lethargic.  2019-no fevers.  Wants to know the plan.  More awake and responsive.  2019 patient has no fevers.  She is drenching and sweats.  Complains of hip pain.  2019-low-grade fevers.  No new issues overnight.  Wants to walk.  Complains of pain.  Labs Reviewed    Review of Systems:  Review of Systems   Constitutional: Positive for diaphoresis, fever and malaise/fatigue.   HENT: Negative for hearing loss.    Respiratory: Negative for cough and shortness of breath.    Cardiovascular: Negative for chest pain and leg swelling.   Gastrointestinal: Negative for nausea and vomiting.   Genitourinary: Negative for dysuria.   Musculoskeletal: Positive for back pain and joint pain. Negative for myalgias.   Neurological: Negative for sensory change and speech change.       Hemodynamics:  Temp (24hrs), Av.9 °C (98.4 °F), Min:36.5 °C (97.7 °F), Max:37.9 °C (100.2 °F)  Temperature: 36.8 °C (98.2 °F)  Pulse  Av.5  Min: 59  Max: 153  Blood Pressure : 153/67       Physical Exam:  Physical Exam   Constitutional: She is oriented to person, place, and time. She appears well-developed and well-nourished. No distress.   HENT:   Head: Normocephalic and atraumatic.   Eyes: Pupils are equal, round, and reactive to light. EOM are normal. Right eye exhibits no discharge. Left eye exhibits no discharge.   Neck: Neck supple.   Cardiovascular: Normal rate and intact distal pulses.    Murmur heard.  Pulmonary/Chest: Effort normal and breath sounds normal. No respiratory distress. She has no wheezes.   Abdominal: Soft. Bowel sounds are normal. She exhibits no distension. There is no tenderness.   Musculoskeletal: She exhibits no edema, tenderness or deformity.   Right upper extremity PICC line   Lymphadenopathy:     She has no cervical adenopathy.   Neurological: She is alert and oriented to person, place, and time.   Moves all extremities   Skin: Skin is warm. No rash noted. She is diaphoretic.   Nursing note and  vitals reviewed.    Meds:    Current Facility-Administered Medications:   •  acetaminophen  •  hydrALAZINE  •  ketorolac  •  meropenem  •  haloperidol lactate  •  QUEtiapine  •  gabapentin  •  labetalol  •  loperamide  •  losartan  •  MD Alert...Vancomycin per Pharmacy  •  Notify provider if pain remains uncontrolled **AND** Use the numeric rating scale (NRS-11) on regular floors and Critical-Care Pain Observation Tool (CPOT) on ICUs/Trauma to assess pain **AND** Pulse Ox (Oximetry) **AND** Pharmacy Consult Request **AND** If patient difficult to arouse and/or has respiratory depression, stop any opiates that are currently infusing and call a Rapid Response. **AND** [DISCONTINUED] oxyCODONE immediate-release **AND** [DISCONTINUED] oxyCODONE immediate-release **AND** [DISCONTINUED] morphine injection  •  omeprazole  •  ondansetron  •  ondansetron  •  potassium chloride SA  •  pramipexole  •  Respiratory Care per Protocol  •  vancomycin    Labs:  Recent Labs      04/17/19   0016  04/18/19   0211  04/19/19   0202   WBC  4.3*  3.3*  3.3*   RBC  4.76  4.51  5.22   HEMOGLOBIN  11.8*  11.1*  12.6   HEMATOCRIT  38.3  36.5*  42.4   MCV  80.5*  80.9*  81.2*   MCH  24.8*  24.6*  24.1*   RDW  51.9*  50.6*  50.9*   PLATELETCT  127*  84*  100*   MPV  11.1  10.2   --    NEUTSPOLYS  56.20  63.30  72.90*   LYMPHOCYTES  20.10*  13.10*  13.60*   MONOCYTES  16.40*  13.80*  0.80   EOSINOPHILS  5.90  8.60*  7.60*   BASOPHILS  1.20  0.90  0.90   RBCMORPHOLO   --    --   Present     Recent Labs      04/17/19   0015   SODIUM  137   POTASSIUM  4.4   CHLORIDE  107   CO2  23   GLUCOSE  91   BUN  18     Recent Labs      04/17/19   0015   ALBUMIN  2.7*   TBILIRUBIN  0.6   ALKPHOSPHAT  198*   TOTPROTEIN  6.9   ALTSGPT  14   ASTSGOT  28   CREATININE  0.64       Imaging:  Ct-needle Bx-muscle Right    Result Date: 3/13/2019  3/11/2019 4:44 PM HISTORY/REASON FOR EXAM:  Right gluteal hypodense lesion. Moderate sedation was administered with continuous  monitoring of the patient under the direct supervision of  Medications: 2 mg of Versed and 200 mcg of fentanyl Total sedation time 60 minutes Procedure: After the informed consent the patient was placed on the CT table on prone position. Localization CT scan was obtained. It demonstrates hypodense area in the right gluteus muscle. Subsequently a 17-gauge needle was advanced into the lesion. 20  mL of pus was aspirated. The materials were sent to the pathology. The patient tolerated the procedure without any immediate competition.     CT-guided aspiration of pus like material in the right gluteal muscle lesion.    Ec-echocardiogram Complete W/o Cont    Result Date: 3/13/2019  Transthoracic Echo Report Echocardiography Laboratory CONCLUSIONS No prior study is available for comparison. Normal left ventricular systolic function. Left ventricular ejection fraction is visually estimated to be 65%. Normal diastolic function. Normal inferior vena cava size and inspiratory collapse. Aortic sclerosis without stenosis. Estimated right ventricular systolic pressure  is 33 mmHg. No obvious valvular vegetations are noted on a decent quality study.  If further valvular assessment is clinically indicated, consider transesophageal echocardiogram. SAM,  LV EF:  65    % FINDINGS Left Ventricle Normal left ventricular size and systolic function. Normal left ventricular wall thickness. Left ventricular ejection fraction is visually estimated to be 65%. Normal diastolic function. Right Ventricle Normal right ventricular size and systolic function. Right Atrium Normal right atrial size. Normal inferior vena cava size and inspiratory collapse. Left Atrium Normal left atrial size. Mitral Valve Structurally normal mitral valve without significant stenosis or regurgitation. Aortic Valve Aortic sclerosis without stenosis. No aortic insufficiency. Tricuspid Valve Structurally normal tricuspid valve. Mild tricuspid  "regurgitation. Right atrial pressure is estimated to be 3 mmHg. Estimated right ventricular systolic pressure  is 33 mmHg. Pulmonic Valve The pulmonic valve is not well visualized. No pulmonic insufficiency. Pericardium Normal pericardium without effusion. Aorta The aortic root is normal.  Ascending aorta diameter is 3.0 cm. Claire Tripathi MD (Electronically Signed) Final Date:     13 March 2019                 14:38      Micro:  Results     Procedure Component Value Units Date/Time    BLOOD CULTURE [[id number]] Collected:  04/18/19 1536    Order Status:  Completed Specimen:  Blood from Peripheral Updated:  04/19/19 0813     Significant Indicator NEG     Source BLD     Site PERIPHERAL     Blood Culture No Growth    Note: Blood cultures are incubated for 5 days and  are monitored continuously.Positive blood cultures  are called to the RN and reported as soon as  they are identified.      Narrative:       Per Hospital Policy: Only change Specimen Src: to \"Line\" if  specified by physician order.    BLOOD CULTURE [[id number]] Collected:  04/18/19 1536    Order Status:  Completed Specimen:  Blood from Peripheral Updated:  04/19/19 0813     Significant Indicator NEG     Source BLD     Site PERIPHERAL     Blood Culture No Growth    Note: Blood cultures are incubated for 5 days and  are monitored continuously.Positive blood cultures  are called to the RN and reported as soon as  they are identified.      Narrative:       Per Hospital Policy: Only change Specimen Src: to \"Line\" if  specified by physician order.    URINALYSIS [[id number]]     Order Status:  No result Specimen:  Urine from Urine, Clean Catch     URINALYSIS [[id number]]  (Abnormal) Collected:  04/12/19 1148    Order Status:  Completed Specimen:  Urine from Urine, Cath Updated:  04/12/19 1246     Color Yellow     Character Clear     Specific Gravity 1.011     Ph 7.0     Glucose Negative mg/dL      Ketones Negative mg/dL      Protein 30 (A) mg/dL      Bilirubin Negative "     Urobilinogen, Urine 0.2     Nitrite Negative     Leukocyte Esterase Trace (A)     Occult Blood Negative     Micro Urine Req Microscopic    Narrative:       Collected By:[id number] Follica MOHINDER URIBE          Assessment:  Active Hospital Problems    Diagnosis   • *Lesion of brain [G93.9]   • Mass of iliopsoas muscle group [M62.89]   • Encephalopathy [G93.40]   •    • History of breast cancer [Z85.3]   • Diabetes mellitus type 2 in obese (HCC) [E11.69, E66.9]       Plan:  Multiple brain lesions-likely abscesses versus metastatic lesions  Leukopenia  Confusion improving  MRI with scattered small lesions incl aaron, too small to biopsy per neurosurgery  TTE neg; CHAS neg  Bcxs neg to date  Repeat CT scan on 3/30/2019 shows decrease in the size of the brain lesions, supporting that these are abscesses  Repeat MRI brain prior to completion of abx    Iliopsoas abscess, on treatment  CT + 2.6 cm lesion in the right iliopsoas region  S/p aspiration +WBCs-cultures negative to date  Bcxs remain neg  Last vanc trough of 20.4 and 4/5  D/c cefepime 03/26 - may be contributing to confusion  Tolerating Meropenem started 03/26  MRI w/ contrast lumbar, pelvis 03/27 - Iliopsoas and gluteal fluid collections noted.    Drain placed 3/29/2019  Cultures are negative so far  Repeat CT scan shows resolution of the abscess in the gluteus but increase in the iliopsoas  Another drain was placed on 4/4/2019 and it shows purulent fluid  Repeat CT from 4/8 with resolution of the fluid collections after drainage per my read    New fevers  Repeat blood cultures x2 are negative from 4/18/2019  UA shows some yeast  Repeat CT scan of the pelvis shows reaccumulation of the abscesses  I recommend re-drainage as well as orthopedic follow-up as soon as possible for hardware removal  Give short course of Diflucan    Right hip hardware  Likely infected  This is the likely source of infection.  I spoke to orthopedics about logistics of taking the hardware  out.  As per orthopedics this hardware needs to be in for 6 months.  H/o breast cancer  Markedly elevated alk phos, trending down  Continued diaz for mets    Type 2 diabetes mellitus  Hemoglobin A1c 6.3  Keep BS under 150 to help control current infection    Altered mental status  Appears to be waxing and waning  Multifactorial, likely large contribution from her multiple brain abscesses, monitor  The CT scan done on 4/13/2019 shows persistence of the lesions    I have performed a physical exam and reviewed and updated ROS and plan today 4/19/2019.  In review of yesterday's note 4/18/2019, there are no changes except as documented above.    Continue current plan as outlined above    Discussed with IM

## 2019-04-19 NOTE — DISCHARGE PLANNING
Agency/Facility Name: [facility]  Spoke To: Nohemy  Outcome: Patient acceptance is pending an onsite eval from Dianne kessler.     BRIANA Gonzalez notified

## 2019-04-19 NOTE — PROGRESS NOTES
Pharmacy Kinetics 68 y.o. female on vancomycin day # 26 2019    Currently on Vancomycin 600 mg iv q24hr   ()     Indication for Treatment: possible brain abscesses      Pertinent history per medical record: Admitted on 3/8/2019 for iliopsoas mass and multiple brain lesions. There is concern that this is infectious. All cultures are negative to date, but were all drawn while on antibiotics. CHAS was negative. Patient has a history of breast cancer and DM. ID is following. Plan for completion of abx (6 weeks) in house prior to transfer to [facility] in [place].      Other antibiotics: Meropenem 2g IV q8h     Allergies: Patient has no known allergies.      List concerns for renal function: age, and prolonged vancomycin duration, elevated BUN/SCr ratio      Pertinent cultures to date:   19: wound - right gluteal mass: negative   19: blood - peripheral x 2: negative   19: wound - right buttock: negative   19: blood - peripheral x 2: negative   19: wound - retroperitoneal drain aspirate: negative   19:PBCx2:NGTD     MRSA nares swab if pneumonia is a concern (ordered/positive/negative/n-a): n/a       Recent Labs      19   0016  19   0211  19   0202   WBC  4.3*  3.3*  3.3*   NEUTSPOLYS  56.20  63.30  72.90*   BANDSSTABS   --    --   3.40     Recent Labs      19   0015   BUN  18   CREATININE  0.64   ALBUMIN  2.7*     Recent Labs      19   2141   VANCOTROUGH  42.2*     Intake/Output Summary (Last 24 hours) at 19 0822  Last data filed at 19 0000   Gross per 24 hour   Intake              480 ml   Output                0 ml   Net              480 ml      /67   Pulse 63   Temp 36.8 °C (98.2 °F) (Oral)   Resp 16   Ht 1.524 m (5')   Wt 65.7 kg (144 lb 13.5 oz)   SpO2 91%  Temp (24hrs), Av.9 °C (98.4 °F), Min:36.5 °C (97.7 °F), Max:37.9 °C (100.2 °F)      A/P        1. Vancomycin dose change: none  2. Next vancomycin level:  4/20/19@1900  3. Goal trough: 16-20 mcg/mL  4. Comments: No leukocytosis , cx unchanged. Tmax 37.9. No new renal labs. Appears vancomycin tr was collected ~1 hr after scheduled vancomycin dose was started. Tr level likely inaccurate. BMP,and vancomycin trough ordered.      Willy CandelariaD BCPS

## 2019-04-19 NOTE — CARE PLAN
Problem: Skin Integrity  Goal: Risk for impaired skin integrity will decrease  Outcome: PROGRESSING AS EXPECTED  Pt turned every two hours, waffle mattress in place, Pillows in place of bony prominences.    Problem: Pain Management  Goal: Pain level will decrease to patient's comfort goal    Intervention: Follow pain managment plan developed in collaboration with patient and Interdisciplinary Team  Pt c/o pain. Medicated per MAR. Repositioned for comfort.

## 2019-04-19 NOTE — THERAPY
"Physical Therapy Treatment completed.   Bed Mobility:  Supine to Sit: Minimal Assist (with railing)  Transfers: Sit to Stand: Moderate Assist  Gait: Level Of Assist: Moderate Assist with Front-Wheel Walker       Plan of Care: Will benefit from Physical Therapy 4 times per week  Discharge Recommendations: Equipment: Will Continue to Assess for Equipment Needs. Post-acute therapy Discharge to a transitional care facility for continued skilled therapy services.    Pt with improved independence during mobility. SHe was able to initiate bed mob with min A for LE over bedside, but was able to push herself up to sit. Once seated, pt sustained at close SBA for safety and was able to initiate sit to stand with FWW at mod A for stability. Pt performed x10' of gait with FWW with mod A cues for anterolateral weight shift as well as FWW management. As she fatigued, pt required assist for L LE advancement. At times, pt with incomprehensible speech, but with repetition, PT was able to understand most things said. While here, pt will benefit from ongoing acute PT intervention to progress her mobility. Recommend placement.      See \"Rehab Therapy-Acute\" Patient Summary Report for complete documentation.       "

## 2019-04-19 NOTE — PROGRESS NOTES
"[facility] Medicine Daily Progress Note    Date of Service  4/18/2019    Chief Complaint  68 y.o. female admitted 3/8/2019 with right hip pain, fever and abnormal brain mri.    Hospital Course    Patient started on empiric antibiotics given fever.  She had abnormal findings on MRI brain and CT showed \"lesion\" on right iliopsoas.  This lesion was biopsied and turned out to be abscess.  She has history of breast cancer and there is also concern of brain lesions being metastatic though their appearance is not typical of this.      Interval Problem Update  3/12 Discussed with Dr Turner for second opinion of brain lesions and based on appearance not able to r/o or r/i any diagnosis.  Given patient's presentation of confusion, hip pain and fever  - this is more supportive of infectious etiology rather than malignant.  Fluid from right gluteal area sent to micro and as of yet - no growth.  Continue zosyn for now.  3/13 Patient with slight improvement in mentation.  Blood cultures and abscess cultures are showing no growth at this time.  TTE being done while I examined patient - no evidence of vegetations on this test.  ID consultation pending - d/w Dr Garcia.  3/14 Patient feeling well today, her pain is present but not as bad as prior to admission.  She was able to follow the conversation today and is agreeable to move forward with the CHAS tomorrow.  I spoke with her brother, René, and updated him on condition yesterday afternoon also.  Will also have PICC placed in next few days as long as blood cultures remain negative as I expect a prolonged antibiotic course of treatment.  3/15 Patient without new complaints, states she needs help to move in bed.  CHAS showed no evidence of vegetations and regular diet resumed.  Culture remains negative and biopsy of brain lesion may prove needed - will watch through the weekend and if mentation does not continue to improve, will discuss with neurosurgery on Monday.  3/16 Patient states " she is okay today, mentation has waxed and waned without significant improvement.  She was febrile earlier today.  No other acute events.  3/17 Patient with significant improvement in mentation today, she is aware of her hospitalization, not falling asleep mid sentence.  Her pain mediations were held most of yesterday and likely far too strong for patient and were affecting her awareness.  Oxycodone 5 discontinued and will use tramadol instead unless pain not well controlled.  CT brain with contrast ordered as a quick scan for comparison to MRI.    3/18 Patient feeling same today, discussed need to discuss with neurosurgery for possible biopsy.  Discussed with Dr Nguyen, he reviewed MRI and CT brain and he feels they are likely metastatic lesions but are far too small to biopsy.  His recommendation is to continue with antibiotics and re-image in 2 weeks to see if any change that would be amenable to biopsy or that would further declare infection.  3/26 Patient mental status continues to wax and wane.  ID ordered MRI lumbar, pelvis and sacrum for evaluation for source of infection, patient with continued low back pain and no real improvement since I saw her 7 days prior with continued antibiotics during this time.  Cultures have not growth pathogen, repeat MRI 3/28.   3/27 Patient somnolent most of the day today, MRI's completed and showing 2 areas of fluid collections, given her history are likely abscesses and will require drainage, CT guided drainage requested and patient NPO at MN for this.  D/w ID - cultures will be done on fluid collection.  Repeat CT brain ordered for tomorrow.  3/28 Patient up to chair, diet resumed as lovenox given this am and drainage of abscesses deferred until tomorrow.  Vanco/merrem to continue and cultures will be collected from abscesses.  3/29 Patient went to IR today, had drain placed in the right buttock into abscess cavity and fluid sent for culture.  Potassium is slightly low,  continue with PO replacement.  HR has improved from yesterday but patient PO intake still not at normal level, increase IVF rate to 100cc/hour.  3/30 Patient without fever since drain placed right buttock.  Purulent material in ÁNGEL bulb. Cultures thus far are still showing no growth.  Antibiotics to continue.  CT head ordered to evaluate change in past 2 weeks as recommended by neurosurgery.  3/31 Patient with fevers overnight - was altered during this time but has recovered.  She denies pain when examined by me.  She still has purulent material in the drainage tubing.  Will continue with current IV antibiotics - 6 weeks total antibiotics suspected - end date would be 4/24/19.  D/w ID.  4/1 Patient remains intermittently somnolent.  She wakes easily but falls back to sleep quickly.  She remained afebrile overnight.  ÁNGEL drain continues to drain purulent material.  ABX through 4/24 - once accepting facility found, patient okay to transfer with midline intact for completion of antibiotics.    4/9 Patient somnolent when examined.  CT showed resolution of fluid collection where ÁNGEL in place.  RN to remove ÁNGEL today.  All cultures still remain without growth.  4/10 Patient seen when working with OT, she is awake but gets off task quickly and is easily fatigued.  ÁNGEL drain removed yesterday.  Patient states she has pain in the lumbar spine when she coughs - just above her surgical area.  She has been in bed for nearly 1 month and this is likely not helping her back pain - educated need for OOB as often as tolerated.  4/11 Patient had bowel incontinence prior to my entering room, states she thinks she needs a bed pan.  CNA notified.  She continues to have low back pain in the area of her surgery and given her bed-bound status during her infection and intermittent confusion, this is not unexpected.  Continuing with therapy recommended and patient is 1:1 feeding given her weakness to feed herself.  4/12 Patient tearful this am,  she is concerned that she will not walk again, support given and educated that she is weak and the inability to walk is only temporary.  She needs to work with therapy and get stronger and I need to stop her narcotics and only give tyelnol/ibuprofen for pain management along with non-narcotic alternatives.  I spoke to patient's brother, René, at her request.  4/13 Patient up to chair when evaluated, mentation improved with discontinuation of narcotics. Brother is coming to visit this weekend and she is looking forward to this.  Ortho recommendations still pending - per ID she spoke to Dr Porter who is consulting Dr Sandra for recommendations.  4/14 Patient up to chair with assistance today.  CT brain remains same as prior 2 weeks ago.  Still pending ortho consultation.  4/15 Patient to be seen by ortho today per Dr Porter.  D/w dietary - patient with weight loss and poor po intake, she needs to be a 1:1 feed patient and if she fails this, will likely need tube feeding to get adequate nutrition.  4/16: Lethargic.  Opens eyes to physical stimulus but drifts quickly back to sleep.  Has been tachycardic with soft blood pressure in the morning.  Lasix held.  Palliative consulted.  4/17: Awake and alert this morning.  Interactive.  Follows simple commands and answer simple questions.  Disoriented to time.  No distress noted.  No issues overnight.  4/18: Awake this morning and interactive.  Slow to respond.  Still disoriented to time.  Can answer simple questions and follow simple commands.  Pelvic CT scan ordered to follow-up on pelvic abscess.  Consultants/Specialty  ID - Sarah/Ashish  Ortho -   Palliative  Code Status  full    Disposition  SNF/LTAC in [place]    Review of Systems  Review of Systems   Unable to perform ROS: Mental acuity   Musculoskeletal: Back pain: lumbar/sacral area.        Physical Exam  Temp:  [36.5 °C (97.7 °F)-37.9 °C (100.2 °F)] 36.7 °C (98.1 °F)  Pulse:  [] 83  Resp:  [16-20]  16  BP: (106-149)/(61-71) 139/61  SpO2:  [95 %-100 %] 100 %    Physical Exam   Constitutional: No distress.   HENT:   Head: Normocephalic and atraumatic.   Eyes: Pupils are equal, round, and reactive to light. Conjunctivae are normal. No scleral icterus.   Neck: Neck supple. No thyromegaly present.   Cardiovascular: Normal rate, regular rhythm and normal heart sounds.  Exam reveals no gallop and no friction rub.    Pulmonary/Chest: Effort normal and breath sounds normal. No respiratory distress. She has no wheezes.   Abdominal: Soft. Bowel sounds are normal. She exhibits no distension. There is no tenderness.   Musculoskeletal: She exhibits no tenderness or deformity.          Neurological: She is alert. She is disoriented. No cranial nerve deficit.        Skin: Skin is warm. She is not diaphoretic. No erythema. There is pallor.   Psychiatric: She has a normal mood and affect. She is slowed.   Nursing note and vitals reviewed.      Fluids  No intake or output data in the 24 hours ending 04/18/19 1837    Laboratory  Recent Labs      04/16/19   0531  04/17/19   0016  04/18/19   0211   WBC  4.7*  4.3*  3.3*   RBC  5.09  4.76  4.51   HEMOGLOBIN  12.7  11.8*  11.1*   HEMATOCRIT  40.2  38.3  36.5*   MCV  79.0*  80.5*  80.9*   MCH  25.0*  24.8*  24.6*   MCHC  31.6*  30.8*  30.4*   RDW  49.8  51.9*  50.6*   PLATELETCT  131*  127*  84*   MPV  10.6  11.1  10.2     Recent Labs      04/16/19   0531  04/17/19   0015   SODIUM  135  137   POTASSIUM  4.1  4.4   CHLORIDE  103  107   CO2  22  23   GLUCOSE  105*  91   BUN  15  18   CREATININE  0.57  0.64   CALCIUM  10.9*  10.6*                   Imaging  CT-CTA CHEST PULMONARY ARTERY W/ RECONS   Final Result         1.  No large central pulmonary embolus is appreciated, evaluation of the subsegmental branches is essentially nondiagnostic due to motion artifacts. Additional imaging would be required for definitive exclusion of small distal pulmonary emboli.   2.  Trace pericardial  effusion   3.  Hepatomegaly   4.  Atherosclerosis.   5.  Right pulmonary nodules, the largest is a 7.7 mm pulmonary nodule, see nodule follow-up recommendations below.      Low Risk: CT at 3-6 months, then consider CT at 18-24 months      High Risk: CT at 3-6 months, then at 18-24 months      Comments: Use most suspicious nodule as guide to management. Follow-up intervals may vary according to size and risk.      Low Risk - Minimal or absent history of smoking and of other known risk factors.      High Risk - History of smoking or of other known risk factors.      Note: These recommendations do not apply to lung cancer screening, patients with immunosuppression, or patients with known primary cancer.      Fleischner Society 2017 Guidelines for Management of Incidentally Detected Pulmonary Nodules in Adults         DX-CHEST-PORTABLE (1 VIEW)   Final Result         1.  Mild pulmonary edema and/or infiltrate   2.  Cardiomegaly      CT-HEAD WITH & W/O   Final Result      1.  No change in scattered hyperenhancing lesions throughout the brain, cerebellum and visualized brainstem. Some of them demonstrate mild associated edema, similar to prior study.   2.  No CT evidence of infarct or hemorrhage.      CT-NEEDLE BX-MUSCLE RIGHT   Final Result   Addendum 1 of 1   Addendum:   The biopsy/drainage was done utilizing low dose optimization CT techniques    including auto modulation for  imaging and low mA CT/fluoroscopy mode    for the procedure.      Final      CT-guided aspiration of pus like material in the right gluteal muscle lesion.      CT-ABDOMEN-PELVIS WITH   Final Result      1.  Resolution of right iliacus abscess with no associated fluid adjacent to the drainage catheter      2.  Interval removal of right gluteal drainage catheter      3.  Surgical screw traversing both sacroiliac joint with associated pathologic fracture of the right ilium and right medial sacrum      4.  Hepatomegaly      CT-DRAIN-HEMATOMA -  SEROMA   Final Result      1.  CT-GUIDED RETROPERITONEAL (EXTRAPERITONEAL) RIGHT PELVIC ABSCESS DRAINAGE AT THE RIGHT ILIOPSOAS. ORDERS WERE WRITTEN FOR ONCE DAILY IRRIGATION OF THE CATHETER WITH 5 ML STERILE SALINE.      2.  THE CURRENT PLAN IS TO MONITOR DRAINAGE OUTPUT AND OBTAIN A FOLLOWUP CT SCAN IN 5-7 DAYS IF CLINICALLY INDICATED.      CT-ABDOMEN-PELVIS WITH   Final Result      1.  Rim-enhancing fluid collection in the right iliopsoas muscle may have increased in size slightly from the prior exam.      2.  Pigtail catheter in the right gluteus medius muscle. No residual fluid collection is appreciated.      3.  Pathologic fracture of the right ilium. Status post right SI joint fusion.      4.  Atherosclerosis.      5.  Cholelithiasis.      CT-HEAD WITH & W/O   Final Result      1.  Interval decrease in size and level of enhancement of multiple small punctate foci in both cerebral hemispheres and in the midbrain consistent with improving septic emboli.      2.  No hemorrhage or mass effect.      CT-DRAIN-RETROPERITONEAL   Final Result      1.  CT GUIDED CATHETER DRAINAGE OF RIGHT GLUTEAL ABSCESS.   2.  THE CURRENT PLAN IS TO OBTAIN A FOLLOW-UP CT SCAN IN 5-7 DAYS IF INDICATED. ORDERS WERE WRITTEN FOR ONCE DAILY IRRIGATION OF THE CATHETER WITH 5 ML STERILE SALINE FOR 3 DAYS.   3. THE ILIOPSOAS COLLECTION WAS NOT AMENABLE TO CATHETER DRAINAGE AS INTERVENING BOWEL AND/OR BONY STRUCTURES OBSTRUCT A PATHWAY TO THIS COLLECTION AT THIS TIME.      MR-LUMBAR SPINE-WITH & W/O   Final Result      1.  There are multifocal enhancing fluid collections in the right iliopsoas muscles and gluteus muscles adjacent to the right ilium. Right iliopsoas fluid collection measures an approximately 3.9 x 2.9 cm. The right gluteal fluid collection measures an    approximately 3.5 x 2.8 cm in size. The differential diagnosis includes postsurgical seroma and abscess.   2.  Post operative and degenerative changes as described above.       MR-PELVIS-WITH & W/O AND SEQUENCES   Final Result      1.  Surgical screw traverses both sacroiliac joint across presumed pathologic fracture of the right sacrum and the hardware obscures the adjacent tissues.      2.  No other evidence for bony metastatic disease      3.  Right gluteal musculature rim-enhancing fluid collection measuring 3.4 x 2.8 cm. This could be a postoperative seroma versus abscess      4.  osteoarthritis of both hip joint      5.  Prior hysterectomy      IR-MIDLINE CATHETER INSERTION WO GUIDANCE > AGE 3   Final Result                  Ultrasound-guided midline placement performed by qualified nursing staff    as above.          CT-HEAD WITH   Final Result      Multiple subcentimeter too numerous to count enhancing masses scattered throughout the supratentorial and infratentorial brain. These demonstrate some surrounding vasogenic edema and most likely represent multifocal metastatic lesions. Other    considerations would include multifocal abscesses or septic emboli.      EC-CHAS W/O CONT   Final Result      EC-CHAS W/O CONT   Final Result      EC-ECHOCARDIOGRAM COMPLETE W/O CONT   Final Result      OUTSIDE IMAGES-CT CHEST/ABDOMEN/PELVIS   Final Result      OUTSIDE IMAGES-DX CHEST   Final Result      [id number] FILM X-RAY   Final Result      OUTSIDE IMAGES-MR BRAIN   Final Result      OUTSIDE IMAGES-MR BRAIN   Final Result      CT-ABDOMEN-PELVIS WITH    (Results Pending)        Assessment/Plan  * Lesion of brain   Assessment & Plan    Metastatic cancer vs infectious etiology  D/w Dr Black for questionable brain mets, recommending IR Bx of psoas muscle   IR Bx of psoas muscle ordered - was abscess  Empiric treatment of infection  TTE and CHAS negative for vegetations.  D/w Dr Nguyen - lesions likely metastatic based on his interpretation of MRI/CT - too small to biopsy, recommends continuing antibiotics and re-imaging in 2 weeks to see if there has been any change.  CT brain to be  repeated - lesions same as prior 2 weeks ago.  Will likely need follow-up CT scan of the brain to assess effectiveness of antibiotics.  Continue with vancomycin and Merrem.  Cultures has been negative so far.  Monitor kidney functions closely and monitor vancomycin trough with a goal between 15 and 20.  Palliative consulted.       Encephalopathy   Assessment & Plan    Waxes and wanes.  Likely secondary to multiple brain lesions.  Avoid benzodiazepines and/or anticholinergic medications.  Avoid sedatives or hypnotics.         Mass of iliopsoas muscle group   Assessment & Plan    Return of fluid collection, repeat drainage in IR with cultures, drain placed 3/29 - 10ml purulent material drained and sent for culture - no growth a/o 3/30  Was on linezolid and cefepime and now on vanco/merrem - end date to be 6 weeks of treatment. (4/24/19)  ID following.  Repeat CT abdomen shows resolution of fluid collection and ÁNGEL output dwindling - removed ÁNGEL drain  Cultures are negative so far.  CHAS negative for endocarditis.  Patient also has right hip ORIF.  Orthopedic surgery recommended hardware to stay for at least 6 months to promote adequate healing and to continue with antibiotics for now.  4/18: Repeat CT scan of the pelvic area ordered to follow-up on resolution and response to treatment.         Pancytopenia (HCC)   Assessment & Plan    Could be due to toxic effects of infection on bone marrow.  Afebrile.  ANC within normal limits.  Continue to monitor.       Hyponatremia- (present on admission)   Assessment & Plan    Mildly low - doubt contribution to mentation         RLS (restless legs syndrome)- (present on admission)   Assessment & Plan    Pramipexole       Diabetes mellitus type 2 in obese (HCC)- (present on admission)   Assessment & Plan    Well controlled   Short acting insulin as needed   Accu-Checks, hypoglycemia protocol          History of breast cancer- (present on admission)   Assessment & Plan    Status post  mastectomy.  And none if brain lesions represent metastasis but less likely as repeat CT showed decrease in size with antibiotics wishes consistent with abscesses rather than metastasis.       COPD (chronic obstructive pulmonary disease) (HCC)- (present on admission)   Assessment & Plan    Oxygen as needed, Respiratory protocol, Bronchodilators, Incentive spirometry      Sinus tachycardia- (present on admission)   Assessment & Plan    Likely due to combination of infection and volume loss.  Echocardiogram with normal ejection fraction and no significant valvular abnormalities.  Check TSH.  Continue to monitor.     Urinary tract infection   Assessment & Plan    Culture remain no growth.       Fungal infection of the groin   Assessment & Plan    on nystatin powder 3/24     History of deep vein thrombosis- (present on admission)   Assessment & Plan    History of deep vein thrombosis   Was on Rivaroxaban as outpatient.     Held for Bx Mar 11  Restart AC after completing abx treatment         Essential hypertension- (present on admission)   Assessment & Plan    Restarted on losartan and furosemide with hold parameters.     Fever   Assessment & Plan    PRN Tylenol.     GERD (gastroesophageal reflux disease)- (present on admission)   Assessment & Plan    Omeprazole     Chronic pain- (present on admission)   Assessment & Plan     Multifactorial  Likely secondary to abscess, fracture, bed bound status  Pain control       Plan of care discussed with multidisciplinary team during rounds.    VTE prophylaxis: scds

## 2019-04-20 LAB
BASOPHILS # BLD AUTO: 1 % (ref 0–1.8)
BASOPHILS # BLD: 0.04 K/UL (ref 0–0.12)
EOSINOPHIL # BLD AUTO: 0.25 K/UL (ref 0–0.51)
EOSINOPHIL NFR BLD: 6.3 % (ref 0–6.9)
ERYTHROCYTE [DISTWIDTH] IN BLOOD BY AUTOMATED COUNT: 50.8 FL (ref 35.9–50)
HCT VFR BLD AUTO: 36.7 % (ref 37–47)
HGB BLD-MCNC: 11.2 G/DL (ref 12–16)
IMM GRANULOCYTES # BLD AUTO: 0.03 K/UL (ref 0–0.11)
IMM GRANULOCYTES NFR BLD AUTO: 0.8 % (ref 0–0.9)
INR PPP: 1.06 (ref 0.87–1.13)
LYMPHOCYTES # BLD AUTO: 0.62 K/UL (ref 1–4.8)
LYMPHOCYTES NFR BLD: 15.7 % (ref 22–41)
MCH RBC QN AUTO: 24.5 PG (ref 27–33)
MCHC RBC AUTO-ENTMCNC: 30.5 G/DL (ref 33.6–35)
MCV RBC AUTO: 80.3 FL (ref 81.4–97.8)
MONOCYTES # BLD AUTO: 0.67 K/UL (ref 0–0.85)
MONOCYTES NFR BLD AUTO: 17 % (ref 0–13.4)
NEUTROPHILS # BLD AUTO: 2.33 K/UL (ref 2–7.15)
NEUTROPHILS NFR BLD: 59.2 % (ref 44–72)
NRBC # BLD AUTO: 0 K/UL
NRBC BLD-RTO: 0 /100 WBC
PLATELET # BLD AUTO: 123 K/UL (ref 164–446)
PMV BLD AUTO: 11.1 FL (ref 9–12.9)
PROTHROMBIN TIME: 13.9 SEC (ref 12–14.6)
RBC # BLD AUTO: 4.57 M/UL (ref 4.2–5.4)
WBC # BLD AUTO: 3.9 K/UL (ref 4.8–10.8)

## 2019-04-20 PROCEDURE — A9270 NON-COVERED ITEM OR SERVICE: HCPCS | Performed by: INTERNAL MEDICINE

## 2019-04-20 PROCEDURE — 85610 PROTHROMBIN TIME: CPT

## 2019-04-20 PROCEDURE — 700102 HCHG RX REV CODE 250 W/ 637 OVERRIDE(OP): Performed by: INTERNAL MEDICINE

## 2019-04-20 PROCEDURE — 700111 HCHG RX REV CODE 636 W/ 250 OVERRIDE (IP): Performed by: FAMILY MEDICINE

## 2019-04-20 PROCEDURE — 97530 THERAPEUTIC ACTIVITIES: CPT

## 2019-04-20 PROCEDURE — 700102 HCHG RX REV CODE 250 W/ 637 OVERRIDE(OP): Performed by: FAMILY MEDICINE

## 2019-04-20 PROCEDURE — 99232 SBSQ HOSP IP/OBS MODERATE 35: CPT | Performed by: INTERNAL MEDICINE

## 2019-04-20 PROCEDURE — 99233 SBSQ HOSP IP/OBS HIGH 50: CPT | Performed by: FAMILY MEDICINE

## 2019-04-20 PROCEDURE — 770006 HCHG ROOM/CARE - MED/SURG/GYN SEMI*

## 2019-04-20 PROCEDURE — 700105 HCHG RX REV CODE 258: Performed by: INTERNAL MEDICINE

## 2019-04-20 PROCEDURE — 700111 HCHG RX REV CODE 636 W/ 250 OVERRIDE (IP): Performed by: INTERNAL MEDICINE

## 2019-04-20 PROCEDURE — 85025 COMPLETE CBC W/AUTO DIFF WBC: CPT

## 2019-04-20 PROCEDURE — 36415 COLL VENOUS BLD VENIPUNCTURE: CPT

## 2019-04-20 PROCEDURE — 700111 HCHG RX REV CODE 636 W/ 250 OVERRIDE (IP): Performed by: HOSPITALIST

## 2019-04-20 PROCEDURE — 302146: Performed by: FAMILY MEDICINE

## 2019-04-20 PROCEDURE — A9270 NON-COVERED ITEM OR SERVICE: HCPCS | Performed by: FAMILY MEDICINE

## 2019-04-20 RX ORDER — DIPHENHYDRAMINE HCL 25 MG
25 TABLET ORAL ONCE
Status: COMPLETED | OUTPATIENT
Start: 2019-04-20 | End: 2019-04-20

## 2019-04-20 RX ORDER — DIPHENHYDRAMINE HCL 25 MG
25 TABLET ORAL PRN
Status: COMPLETED | OUTPATIENT
Start: 2019-04-20 | End: 2019-04-21

## 2019-04-20 RX ORDER — DIPHENHYDRAMINE HYDROCHLORIDE 50 MG/ML
25 INJECTION INTRAMUSCULAR; INTRAVENOUS ONCE
Status: COMPLETED | OUTPATIENT
Start: 2019-04-20 | End: 2019-04-20

## 2019-04-20 RX ORDER — DIPHENHYDRAMINE HYDROCHLORIDE 50 MG/ML
25 INJECTION INTRAMUSCULAR; INTRAVENOUS PRN
Status: COMPLETED | OUTPATIENT
Start: 2019-04-20 | End: 2019-04-21

## 2019-04-20 RX ORDER — ACETAMINOPHEN 325 MG/1
650 TABLET ORAL ONCE
Status: ACTIVE | OUTPATIENT
Start: 2019-04-20 | End: 2019-04-21

## 2019-04-20 RX ADMIN — GABAPENTIN 300 MG: 300 CAPSULE ORAL at 06:34

## 2019-04-20 RX ADMIN — HYDRALAZINE HYDROCHLORIDE 10 MG: 20 INJECTION INTRAMUSCULAR; INTRAVENOUS at 21:20

## 2019-04-20 RX ADMIN — MEROPENEM 2 G: 1 INJECTION, POWDER, FOR SOLUTION INTRAVENOUS at 09:05

## 2019-04-20 RX ADMIN — MEROPENEM 2 G: 1 INJECTION, POWDER, FOR SOLUTION INTRAVENOUS at 00:54

## 2019-04-20 RX ADMIN — VANCOMYCIN HYDROCHLORIDE 600 MG: 100 INJECTION, POWDER, LYOPHILIZED, FOR SOLUTION INTRAVENOUS at 21:22

## 2019-04-20 RX ADMIN — DIPHENHYDRAMINE HCL 25 MG: 25 TABLET ORAL at 18:01

## 2019-04-20 RX ADMIN — HYDRALAZINE HYDROCHLORIDE 10 MG: 20 INJECTION INTRAMUSCULAR; INTRAVENOUS at 00:54

## 2019-04-20 RX ADMIN — MEROPENEM 2 G: 1 INJECTION, POWDER, FOR SOLUTION INTRAVENOUS at 18:02

## 2019-04-20 RX ADMIN — POTASSIUM CHLORIDE 40 MEQ: 1500 TABLET, EXTENDED RELEASE ORAL at 06:34

## 2019-04-20 RX ADMIN — LABETALOL HCL 100 MG: 100 TABLET, FILM COATED ORAL at 09:05

## 2019-04-20 RX ADMIN — OMEPRAZOLE 20 MG: 20 CAPSULE, DELAYED RELEASE ORAL at 06:34

## 2019-04-20 RX ADMIN — ACETAMINOPHEN 650 MG: 325 TABLET, FILM COATED ORAL at 16:42

## 2019-04-20 RX ADMIN — KETOROLAC TROMETHAMINE 15 MG: 30 INJECTION, SOLUTION INTRAMUSCULAR at 22:35

## 2019-04-20 RX ADMIN — FLUCONAZOLE 200 MG: 100 TABLET ORAL at 06:34

## 2019-04-20 RX ADMIN — ACETAMINOPHEN 650 MG: 325 TABLET, FILM COATED ORAL at 22:25

## 2019-04-20 RX ADMIN — GABAPENTIN 300 MG: 300 CAPSULE ORAL at 16:43

## 2019-04-20 RX ADMIN — LOSARTAN POTASSIUM 50 MG: 50 TABLET ORAL at 06:34

## 2019-04-20 RX ADMIN — POTASSIUM CHLORIDE 40 MEQ: 1500 TABLET, EXTENDED RELEASE ORAL at 16:43

## 2019-04-20 RX ADMIN — QUETIAPINE FUMARATE 25 MG: 25 TABLET ORAL at 16:43

## 2019-04-20 ASSESSMENT — ENCOUNTER SYMPTOMS
SHORTNESS OF BREATH: 0
HEADACHES: 0
COUGH: 0
VOMITING: 0
NAUSEA: 0
DEPRESSION: 0
BRUISES/BLEEDS EASILY: 0
PHOTOPHOBIA: 0
BACK PAIN: 1
CHILLS: 0
MYALGIAS: 0
ORTHOPNEA: 0
SPEECH CHANGE: 0
DOUBLE VISION: 0
HEARTBURN: 0
PALPITATIONS: 0
HEMOPTYSIS: 0
NECK PAIN: 0
WEAKNESS: 1
DIAPHORESIS: 0
ABDOMINAL PAIN: 1
SENSORY CHANGE: 0
FEVER: 0
DIZZINESS: 0
BLURRED VISION: 0

## 2019-04-20 ASSESSMENT — COGNITIVE AND FUNCTIONAL STATUS - GENERAL
WALKING IN HOSPITAL ROOM: A LOT
MOVING TO AND FROM BED TO CHAIR: A LOT
MOBILITY SCORE: 11
SUGGESTED CMS G CODE MODIFIER MOBILITY: CL
TURNING FROM BACK TO SIDE WHILE IN FLAT BAD: A LITTLE
STANDING UP FROM CHAIR USING ARMS: A LOT
MOVING FROM LYING ON BACK TO SITTING ON SIDE OF FLAT BED: UNABLE
CLIMB 3 TO 5 STEPS WITH RAILING: TOTAL

## 2019-04-20 NOTE — THERAPY
"Physical Therapy Treatment completed.   Bed Mobility:  Supine to Sit: Moderate Assist  Transfers: Sit to Stand: Moderate Assist  Gait: Level Of Assist: Moderate Assist with Front-Wheel Walker       Plan of Care: Will benefit from Physical Therapy 4 times per week  Discharge Recommendations: Equipment: Will Continue to Assess for Equipment Needs. Post-acute therapy Recommend inpatient transitional care services for continued physical therapy services.         See \"Rehab Therapy-Acute\" Patient Summary Report for complete documentation.     Pt pleasant and agreeable to therapy session. Pt was more fatigued today and did report pain in her hip. Pt required modA for trunk to get to EOB. Where she maintained CGA but intermittently [place] due to instability of surface for seated balance. Performed two sit to stands with modA and HHA..Gait deferred by pt due to genarlized fatigue today. As well pt wanting to rest as she is going to a procedure of draining abcess today. While here pt will benefit from continued acute skilled therapy to maximize her mobility. Continue to recommend post acute placement at ID.   "

## 2019-04-20 NOTE — PROGRESS NOTES
Report received by day shift RN. Pt laying in bed awake alert and oriented to person and place. Pt updated to POC and verbalized understanding. Bed locked in low position with fall precautions in place and call light in reach.

## 2019-04-20 NOTE — PROGRESS NOTES
Infectious Disease Progress Note    Author: Ashely Hinojosa M.D. Date & Time of service: 4/20/2019  10:53 AM    Chief Complaint:  Multiple brain lesions  Iliopsoas lesion    Interval History:  68-year-old female with history of diabetes and breast cancer, admitted 3/8/2019 with abdominal pain, iliopsoas lesion, and an abnormal MRI with multiple brain lesions.  4/1/2019 no fevers.  Easily arousable.  ÁNGEL continues to drain.  4/2/2019-no fevers.  Awake and responsive but feels weak.  There is minimal drainage from the ÁNGEL.  As per the nursing the patient is incontinent of stool.  But when you talk to her she states she is just is not able to get up on time.  4/3/2019-complains of some weakness in the hand. No fevers. The drain is not draining much  4/4- no fevers. She is anxious since she has been NPO. Awaiting IR drain  4/5 no fevers.  Got her drain.  There was purulent fluid.  WBCs 4.6.  4/8 afebrile, white count 3.3.  IR drain remains in place.  Continues to have significant word finding difficulty.  4/9 per RN, patient more awake and interactive but remains confused with word finding difficulty.  Resting comfortably this morning  4/10 afebrile, white count 4.6.  Patient much more awake and interactive today.  Reports no new issues, reports no pain anywhere.  IR drain has been removed.  4/11 afebrile WBC 4.2 patient complains of pain in her legs and feet.  She is being assisted with feeding.  Denies any headaches or neck pain  4/12/2019 no fevers.  Patient is alert and oriented.  Although really slow.  Apparently when she gets pain medications she becomes confused.  But this morning she is answering all questions appropriately.  4/13/2019 patient is somewhat confused.  No fevers.  No new issues overnight.  4/14/2019 no fevers.  Somewhat confused.  WBC is 4.  4/15/2019 no fevers.  Complains of back pain and joint pain.  WBCs 5.5  4/16/2019 patient was transferred to telemetry overnight.  Much more  lethargic.  2019-no fevers.  Wants to know the plan.  More awake and responsive.  2019 patient has no fevers.  She is drenching and sweats.  Complains of hip pain.  2019-low-grade fevers.  No new issues overnight.  Wants to walk.  Complains of pain.   afebrile WBC 3.9 patient is awake and answering questions appropriately.  She does endorse some pain on the right hip.  CT revealed enhancing fluid collection in the right gluteal muscle consistent with a small abscess and a loculated enhancing fluid collection in the right iliac muscle.  Plan for IR drainage today    Labs Reviewed    Review of Systems:  Review of Systems   Constitutional: Positive for malaise/fatigue. Negative for diaphoresis and fever.   HENT: Negative for hearing loss.    Respiratory: Negative for cough and shortness of breath.    Cardiovascular: Negative for chest pain and leg swelling.   Gastrointestinal: Negative for nausea and vomiting.   Genitourinary: Negative for dysuria.   Musculoskeletal: Positive for back pain and joint pain. Negative for myalgias.        Right hip   Neurological: Negative for sensory change and speech change.       Hemodynamics:  Temp (24hrs), Av.7 °C (98 °F), Min:36.1 °C (97 °F), Max:37.7 °C (99.8 °F)  Temperature: 37.7 °C (99.8 °F)  Pulse  Av.4  Min: 59  Max: 153  Blood Pressure : (!) 168/91 (RNNotified )       Physical Exam:  Physical Exam   Constitutional: She is oriented to person, place, and time. She appears well-developed and well-nourished. No distress.   Elderly   HENT:   Head: Normocephalic and atraumatic.   Eyes: Pupils are equal, round, and reactive to light. EOM are normal. Right eye exhibits no discharge. Left eye exhibits no discharge.   Neck: Neck supple.   Cardiovascular: Normal rate and intact distal pulses.    Murmur heard.  Pulmonary/Chest: Effort normal and breath sounds normal. No respiratory distress. She has no wheezes.   Abdominal: Soft. Bowel sounds are normal. She  exhibits no distension. There is no tenderness.   Musculoskeletal: She exhibits no edema, tenderness or deformity.   Right upper extremity PICC line   Lymphadenopathy:     She has no cervical adenopathy.   Neurological: She is alert and oriented to person, place, and time.   Moves all extremities   Skin: Skin is warm. No rash noted.   Nursing note and vitals reviewed.    Meds:    Current Facility-Administered Medications:   •  MD Alert...Total Body Iron Replacement per Pharmacy  •  fluconazole  •  acetaminophen  •  hydrALAZINE  •  ketorolac  •  meropenem  •  haloperidol lactate  •  QUEtiapine  •  gabapentin  •  labetalol  •  loperamide  •  losartan  •  MD Alert...Vancomycin per Pharmacy  •  Notify provider if pain remains uncontrolled **AND** Use the numeric rating scale (NRS-11) on regular floors and Critical-Care Pain Observation Tool (CPOT) on ICUs/Trauma to assess pain **AND** Pulse Ox (Oximetry) **AND** Pharmacy Consult Request **AND** If patient difficult to arouse and/or has respiratory depression, stop any opiates that are currently infusing and call a Rapid Response. **AND** [DISCONTINUED] oxyCODONE immediate-release **AND** [DISCONTINUED] oxyCODONE immediate-release **AND** [DISCONTINUED] morphine injection  •  omeprazole  •  ondansetron  •  ondansetron  •  potassium chloride SA  •  pramipexole  •  Respiratory Care per Protocol  •  vancomycin    Labs:  Recent Labs      04/18/19   0211  04/19/19   0202  04/20/19   0233   WBC  3.3*  3.3*  3.9*   RBC  4.51  5.22  4.57   HEMOGLOBIN  11.1*  12.6  11.2*   HEMATOCRIT  36.5*  42.4  36.7*   MCV  80.9*  81.2*  80.3*   MCH  24.6*  24.1*  24.5*   RDW  50.6*  50.9*  50.8*   PLATELETCT  84*  100*  123*   MPV  10.2   --   11.1   NEUTSPOLYS  63.30  72.90*  59.20   LYMPHOCYTES  13.10*  13.60*  15.70*   MONOCYTES  13.80*  0.80  17.00*   EOSINOPHILS  8.60*  7.60*  6.30   BASOPHILS  0.90  0.90  1.00   RBCMORPHOLO   --   Present   --      Recent Labs      04/19/19   0207    SODIUM  133*   POTASSIUM  4.9   CHLORIDE  104   CO2  22   GLUCOSE  78   BUN  19     Recent Labs      04/19/19   0202   ALBUMIN  2.9*   TBILIRUBIN  0.5   ALKPHOSPHAT  269*   TOTPROTEIN  7.4   ALTSGPT  15   ASTSGOT  39   CREATININE  0.68       Imaging:  Ct-needle Bx-muscle Right    Result Date: 3/13/2019  3/11/2019 4:44 PM HISTORY/REASON FOR EXAM:  Right gluteal hypodense lesion. Moderate sedation was administered with continuous monitoring of the patient under the direct supervision of  Medications: 2 mg of Versed and 200 mcg of fentanyl Total sedation time 60 minutes Procedure: After the informed consent the patient was placed on the CT table on prone position. Localization CT scan was obtained. It demonstrates hypodense area in the right gluteus muscle. Subsequently a 17-gauge needle was advanced into the lesion. 20  mL of pus was aspirated. The materials were sent to the pathology. The patient tolerated the procedure without any immediate competition.     CT-guided aspiration of pus like material in the right gluteal muscle lesion.    Ec-echocardiogram Complete W/o Cont    Result Date: 3/13/2019  Transthoracic Echo Report Echocardiography Laboratory CONCLUSIONS No prior study is available for comparison. Normal left ventricular systolic function. Left ventricular ejection fraction is visually estimated to be 65%. Normal diastolic function. Normal inferior vena cava size and inspiratory collapse. Aortic sclerosis without stenosis. Estimated right ventricular systolic pressure  is 33 mmHg. No obvious valvular vegetations are noted on a decent quality study.  If further valvular assessment is clinically indicated, consider transesophageal echocardiogram. SAM,  LV EF:  65    % FINDINGS Left Ventricle Normal left ventricular size and systolic function. Normal left ventricular wall thickness. Left ventricular ejection fraction is visually estimated to be 65%. Normal diastolic function. Right  "Ventricle Normal right ventricular size and systolic function. Right Atrium Normal right atrial size. Normal inferior vena cava size and inspiratory collapse. Left Atrium Normal left atrial size. Mitral Valve Structurally normal mitral valve without significant stenosis or regurgitation. Aortic Valve Aortic sclerosis without stenosis. No aortic insufficiency. Tricuspid Valve Structurally normal tricuspid valve. Mild tricuspid regurgitation. Right atrial pressure is estimated to be 3 mmHg. Estimated right ventricular systolic pressure  is 33 mmHg. Pulmonic Valve The pulmonic valve is not well visualized. No pulmonic insufficiency. Pericardium Normal pericardium without effusion. Aorta The aortic root is normal.  Ascending aorta diameter is 3.0 cm. Claire Tripathi MD (Electronically Signed) Final Date:     13 March 2019                 14:38      Micro:  Results     Procedure Component Value Units Date/Time    BLOOD CULTURE [[id number]] Collected:  04/18/19 1536    Order Status:  Completed Specimen:  Blood from Peripheral Updated:  04/19/19 0813     Significant Indicator NEG     Source BLD     Site PERIPHERAL     Blood Culture No Growth    Note: Blood cultures are incubated for 5 days and  are monitored continuously.Positive blood cultures  are called to the RN and reported as soon as  they are identified.      Narrative:       Per Hospital Policy: Only change Specimen Src: to \"Line\" if  specified by physician order.    BLOOD CULTURE [[id number]] Collected:  04/18/19 1536    Order Status:  Completed Specimen:  Blood from Peripheral Updated:  04/19/19 0813     Significant Indicator NEG     Source BLD     Site PERIPHERAL     Blood Culture No Growth    Note: Blood cultures are incubated for 5 days and  are monitored continuously.Positive blood cultures  are called to the RN and reported as soon as  they are identified.      Narrative:       Per Hospital Policy: Only change Specimen Src: to \"Line\" if  specified by physician " order.    URINALYSIS [[id number]]     Order Status:  No result Specimen:  Urine from Urine, Clean Catch           Assessment:  Active Hospital Problems    Diagnosis   • *Lesion of brain [G93.9]   • Mass of iliopsoas muscle group [M62.89]   • Encephalopathy [G93.40]   •    • History of breast cancer [Z85.3]   • Diabetes mellitus type 2 in obese (HCC) [E11.69, E66.9]       Plan:  Multiple brain lesions-likely abscesses versus metastatic lesions  Leukopenia  Confusion improving  MRI with scattered small lesions incl aaron, too small to biopsy per neurosurgery  TTE neg; CHAS neg  Bcxs neg to date  Repeat CT scan on 3/30/2019 shows decrease in the size of the brain lesions, supporting that these are abscesses  Repeat MRI brain prior to completion of abx    Iliopsoas abscess, on treatment.  Persistent and worse.  Ongoing workup  CT + 2.6 cm lesion in the right iliopsoas region  S/p aspiration +WBCs-cultures negative to date  Bcxs remain neg  Last vanc trough of 20.4 and 4/5  D/c cefepime 03/26 - may be contributing to confusion  Tolerating Meropenem started 03/26  MRI w/ contrast lumbar, pelvis 03/27 - Iliopsoas and gluteal fluid collections noted.    Drain placed 3/29/2019.  Cultures negative  Another drain was placed on 4/4/2019 and it shows purulent fluid  Repeat CT from 4/8 with resolution of the fluid collections after drainage  Repeat CT on 4/19 revealed right gluteal muscle abscess in addition to recurrent abscess in the right iliac muscle.  Plan for IR drainage today    Fevers  T-max 99.8  Repeat blood cultures x2 are negative from 4/18/2019  UA shows some yeast  Repeat CT scan of the pelvis shows reaccumulation of the abscesses  I recommend re-drainage as well as orthopedic follow-up as soon as possible for hardware removal  Give short course of Diflucan.  Plan for 5-day course total.  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  Recommend hardware removal.   Per  orthopedics this hardware needs to be in for 6 months.  Antibiotics per above    Type 2 diabetes mellitus  Hemoglobin A1c 6.3  Keep BS under 150 to help control current infection    Altered mental status, improved overall  Appears to be waxing and waning  Multifactorial, likely large contribution from her multiple brain abscesses, monitor  The CT scan done on 4/13/2019 shows persistence of the lesions    H/o breast cancer    Discussed with bedside nurse

## 2019-04-20 NOTE — PROGRESS NOTES
"[facility] Medicine Daily Progress Note    Date of Service  4/19/2019    Chief Complaint  68 y.o. female admitted 3/8/2019 with right hip pain, fever and abnormal brain mri.    Hospital Course    Patient started on empiric antibiotics given fever.  She had abnormal findings on MRI brain and CT showed \"lesion\" on right iliopsoas.  This lesion was biopsied and turned out to be abscess.  She has history of breast cancer and there is also concern of brain lesions being metastatic though their appearance is not typical of this.      Interval Problem Update  3/12 Discussed with Dr Turner for second opinion of brain lesions and based on appearance not able to r/o or r/i any diagnosis.  Given patient's presentation of confusion, hip pain and fever  - this is more supportive of infectious etiology rather than malignant.  Fluid from right gluteal area sent to micro and as of yet - no growth.  Continue zosyn for now.  3/13 Patient with slight improvement in mentation.  Blood cultures and abscess cultures are showing no growth at this time.  TTE being done while I examined patient - no evidence of vegetations on this test.  ID consultation pending - d/w Dr Garcia.  3/14 Patient feeling well today, her pain is present but not as bad as prior to admission.  She was able to follow the conversation today and is agreeable to move forward with the CHAS tomorrow.  I spoke with her brother, René, and updated him on condition yesterday afternoon also.  Will also have PICC placed in next few days as long as blood cultures remain negative as I expect a prolonged antibiotic course of treatment.  3/15 Patient without new complaints, states she needs help to move in bed.  CHAS showed no evidence of vegetations and regular diet resumed.  Culture remains negative and biopsy of brain lesion may prove needed - will watch through the weekend and if mentation does not continue to improve, will discuss with neurosurgery on Monday.  3/16 Patient states " she is okay today, mentation has waxed and waned without significant improvement.  She was febrile earlier today.  No other acute events.  3/17 Patient with significant improvement in mentation today, she is aware of her hospitalization, not falling asleep mid sentence.  Her pain mediations were held most of yesterday and likely far too strong for patient and were affecting her awareness.  Oxycodone 5 discontinued and will use tramadol instead unless pain not well controlled.  CT brain with contrast ordered as a quick scan for comparison to MRI.    3/18 Patient feeling same today, discussed need to discuss with neurosurgery for possible biopsy.  Discussed with Dr Nguyen, he reviewed MRI and CT brain and he feels they are likely metastatic lesions but are far too small to biopsy.  His recommendation is to continue with antibiotics and re-image in 2 weeks to see if any change that would be amenable to biopsy or that would further declare infection.  3/26 Patient mental status continues to wax and wane.  ID ordered MRI lumbar, pelvis and sacrum for evaluation for source of infection, patient with continued low back pain and no real improvement since I saw her 7 days prior with continued antibiotics during this time.  Cultures have not growth pathogen, repeat MRI 3/28.   3/27 Patient somnolent most of the day today, MRI's completed and showing 2 areas of fluid collections, given her history are likely abscesses and will require drainage, CT guided drainage requested and patient NPO at MN for this.  D/w ID - cultures will be done on fluid collection.  Repeat CT brain ordered for tomorrow.  3/28 Patient up to chair, diet resumed as lovenox given this am and drainage of abscesses deferred until tomorrow.  Vanco/merrem to continue and cultures will be collected from abscesses.  3/29 Patient went to IR today, had drain placed in the right buttock into abscess cavity and fluid sent for culture.  Potassium is slightly low,  continue with PO replacement.  HR has improved from yesterday but patient PO intake still not at normal level, increase IVF rate to 100cc/hour.  3/30 Patient without fever since drain placed right buttock.  Purulent material in ÁNGEL bulb. Cultures thus far are still showing no growth.  Antibiotics to continue.  CT head ordered to evaluate change in past 2 weeks as recommended by neurosurgery.  3/31 Patient with fevers overnight - was altered during this time but has recovered.  She denies pain when examined by me.  She still has purulent material in the drainage tubing.  Will continue with current IV antibiotics - 6 weeks total antibiotics suspected - end date would be 4/24/19.  D/w ID.  4/1 Patient remains intermittently somnolent.  She wakes easily but falls back to sleep quickly.  She remained afebrile overnight.  ÁNGEL drain continues to drain purulent material.  ABX through 4/24 - once accepting facility found, patient okay to transfer with midline intact for completion of antibiotics.    4/9 Patient somnolent when examined.  CT showed resolution of fluid collection where ÁNGEL in place.  RN to remove ÁNGEL today.  All cultures still remain without growth.  4/10 Patient seen when working with OT, she is awake but gets off task quickly and is easily fatigued.  ÁNGEL drain removed yesterday.  Patient states she has pain in the lumbar spine when she coughs - just above her surgical area.  She has been in bed for nearly 1 month and this is likely not helping her back pain - educated need for OOB as often as tolerated.  4/11 Patient had bowel incontinence prior to my entering room, states she thinks she needs a bed pan.  CNA notified.  She continues to have low back pain in the area of her surgery and given her bed-bound status during her infection and intermittent confusion, this is not unexpected.  Continuing with therapy recommended and patient is 1:1 feeding given her weakness to feed herself.  4/12 Patient tearful this am,  she is concerned that she will not walk again, support given and educated that she is weak and the inability to walk is only temporary.  She needs to work with therapy and get stronger and I need to stop her narcotics and only give tyelnol/ibuprofen for pain management along with non-narcotic alternatives.  I spoke to patient's brother, René, at her request.  4/13 Patient up to chair when evaluated, mentation improved with discontinuation of narcotics. Brother is coming to visit this weekend and she is looking forward to this.  Ortho recommendations still pending - per ID she spoke to Dr Porter who is consulting Dr Sandra for recommendations.  4/14 Patient up to chair with assistance today.  CT brain remains same as prior 2 weeks ago.  Still pending ortho consultation.  4/15 Patient to be seen by ortho today per Dr Porter.  D/w dietary - patient with weight loss and poor po intake, she needs to be a 1:1 feed patient and if she fails this, will likely need tube feeding to get adequate nutrition.  4/16: Lethargic.  Opens eyes to physical stimulus but drifts quickly back to sleep.  Has been tachycardic with soft blood pressure in the morning.  Lasix held.  Palliative consulted.  4/17: Awake and alert this morning.  Interactive.  Follows simple commands and answer simple questions.  Disoriented to time.  No distress noted.  No issues overnight.  4/18: Awake this morning and interactive.  Slow to respond.  Still disoriented to time.  Can answer simple questions and follow simple commands.  Pelvic CT scan ordered to follow-up on pelvic abscess.  4/19: Repeated CT scan of the abdominal pelvis showed recurrence of the right gluteal and iliac muscle abscess.  Discussed with orthopedic surgery and ID who recommended IR CT-guided aspiration and possible drainage placement.  I tried to contact the brother to update him but the phone number was incorrect.  Patient was sitting up in chair comfortably.  She was anxious  after was informed about her CT results.  She is asking when can she be discharged.  No distress noted however.  No issues overnight per  Consultants/Specialty  ID   Ortho -   Palliative  Code Status  full    Disposition  SNF/LTAC in [place]    Review of Systems  Review of Systems   Unable to perform ROS: Mental acuity   Musculoskeletal: Back pain: lumbar/sacral area.        Physical Exam  Temp:  [36.1 °C (97 °F)-36.8 °C (98.2 °F)] 36.1 °C (97 °F)  Pulse:  [] 107  Resp:  [16-20] 18  BP: (136-178)/(67-94) 178/90  SpO2:  [91 %-98 %] 94 %    Physical Exam   Constitutional: No distress.   HENT:   Head: Normocephalic and atraumatic.   Mouth/Throat: No oropharyngeal exudate.   Eyes: Pupils are equal, round, and reactive to light. Conjunctivae are normal.   Neck: Neck supple. No tracheal deviation present. No thyromegaly present.   Cardiovascular: Normal rate, regular rhythm and normal heart sounds.  Exam reveals no gallop and no friction rub.    Pulmonary/Chest: Effort normal and breath sounds normal. No respiratory distress.   Abdominal: Soft. Bowel sounds are normal. She exhibits no distension.   Musculoskeletal: She exhibits no tenderness or deformity.          Neurological: She is alert. She is disoriented. No cranial nerve deficit.        Skin: Skin is warm. She is not diaphoretic. No erythema. There is pallor.   Psychiatric: She has a normal mood and affect. She is slowed.   Nursing note and vitals reviewed.      Fluids    Intake/Output Summary (Last 24 hours) at 04/19/19 1915  Last data filed at 04/19/19 1300   Gross per 24 hour   Intake              980 ml   Output                0 ml   Net              980 ml       Laboratory  Recent Labs      04/17/19   0016  04/18/19   0211  04/19/19   0202   WBC  4.3*  3.3*  3.3*   RBC  4.76  4.51  5.22   HEMOGLOBIN  11.8*  11.1*  12.6   HEMATOCRIT  38.3  36.5*  42.4   MCV  80.5*  80.9*  81.2*   MCH  24.8*  24.6*  24.1*   MCHC  30.8*  30.4*  29.7*   RDW  51.9*  50.6*   50.9*   PLATELETCT  127*  84*  100*   MPV  11.1  10.2   --      Recent Labs      04/17/19   0015  04/19/19   0202   SODIUM  137  133*   POTASSIUM  4.4  4.9   CHLORIDE  107  104   CO2  23  22   GLUCOSE  91  78   BUN  18  19   CREATININE  0.64  0.68   CALCIUM  10.6*  10.6*                   Imaging  CT-ABDOMEN-PELVIS WITH   Final Result         1.  Enhancing fluid collection in the right gluteal muscle, appearance compatible with small abscess.   2.  Loculated enhancing fluid collection in the right iliac is muscle, corresponding with site of prior drain, appearance compatible with reaccumulation of abscess.   3.  Fluid-filled distention of small bowel suggests ileus   4.  Atherosclerosis   5.  Trace pericardial effusion      CT-CTA CHEST PULMONARY ARTERY W/ RECONS   Final Result         1.  No large central pulmonary embolus is appreciated, evaluation of the subsegmental branches is essentially nondiagnostic due to motion artifacts. Additional imaging would be required for definitive exclusion of small distal pulmonary emboli.   2.  Trace pericardial effusion   3.  Hepatomegaly   4.  Atherosclerosis.   5.  Right pulmonary nodules, the largest is a 7.7 mm pulmonary nodule, see nodule follow-up recommendations below.      Low Risk: CT at 3-6 months, then consider CT at 18-24 months      High Risk: CT at 3-6 months, then at 18-24 months      Comments: Use most suspicious nodule as guide to management. Follow-up intervals may vary according to size and risk.      Low Risk - Minimal or absent history of smoking and of other known risk factors.      High Risk - History of smoking or of other known risk factors.      Note: These recommendations do not apply to lung cancer screening, patients with immunosuppression, or patients with known primary cancer.      Fleischner Society 2017 Guidelines for Management of Incidentally Detected Pulmonary Nodules in Adults         DX-CHEST-PORTABLE (1 VIEW)   Final Result         1.   Mild pulmonary edema and/or infiltrate   2.  Cardiomegaly      CT-HEAD WITH & W/O   Final Result      1.  No change in scattered hyperenhancing lesions throughout the brain, cerebellum and visualized brainstem. Some of them demonstrate mild associated edema, similar to prior study.   2.  No CT evidence of infarct or hemorrhage.      CT-NEEDLE BX-MUSCLE RIGHT   Final Result   Addendum 1 of 1   Addendum:   The biopsy/drainage was done utilizing low dose optimization CT techniques    including auto modulation for  imaging and low mA CT/fluoroscopy mode    for the procedure.      Final      CT-guided aspiration of pus like material in the right gluteal muscle lesion.      CT-ABDOMEN-PELVIS WITH   Final Result      1.  Resolution of right iliacus abscess with no associated fluid adjacent to the drainage catheter      2.  Interval removal of right gluteal drainage catheter      3.  Surgical screw traversing both sacroiliac joint with associated pathologic fracture of the right ilium and right medial sacrum      4.  Hepatomegaly      CT-DRAIN-HEMATOMA - SEROMA   Final Result      1.  CT-GUIDED RETROPERITONEAL (EXTRAPERITONEAL) RIGHT PELVIC ABSCESS DRAINAGE AT THE RIGHT ILIOPSOAS. ORDERS WERE WRITTEN FOR ONCE DAILY IRRIGATION OF THE CATHETER WITH 5 ML STERILE SALINE.      2.  THE CURRENT PLAN IS TO MONITOR DRAINAGE OUTPUT AND OBTAIN A FOLLOWUP CT SCAN IN 5-7 DAYS IF CLINICALLY INDICATED.      CT-ABDOMEN-PELVIS WITH   Final Result      1.  Rim-enhancing fluid collection in the right iliopsoas muscle may have increased in size slightly from the prior exam.      2.  Pigtail catheter in the right gluteus medius muscle. No residual fluid collection is appreciated.      3.  Pathologic fracture of the right ilium. Status post right SI joint fusion.      4.  Atherosclerosis.      5.  Cholelithiasis.      CT-HEAD WITH & W/O   Final Result      1.  Interval decrease in size and level of enhancement of multiple small punctate  foci in both cerebral hemispheres and in the midbrain consistent with improving septic emboli.      2.  No hemorrhage or mass effect.      CT-DRAIN-RETROPERITONEAL   Final Result      1.  CT GUIDED CATHETER DRAINAGE OF RIGHT GLUTEAL ABSCESS.   2.  THE CURRENT PLAN IS TO OBTAIN A FOLLOW-UP CT SCAN IN 5-7 DAYS IF INDICATED. ORDERS WERE WRITTEN FOR ONCE DAILY IRRIGATION OF THE CATHETER WITH 5 ML STERILE SALINE FOR 3 DAYS.   3. THE ILIOPSOAS COLLECTION WAS NOT AMENABLE TO CATHETER DRAINAGE AS INTERVENING BOWEL AND/OR BONY STRUCTURES OBSTRUCT A PATHWAY TO THIS COLLECTION AT THIS TIME.      MR-LUMBAR SPINE-WITH & W/O   Final Result      1.  There are multifocal enhancing fluid collections in the right iliopsoas muscles and gluteus muscles adjacent to the right ilium. Right iliopsoas fluid collection measures an approximately 3.9 x 2.9 cm. The right gluteal fluid collection measures an    approximately 3.5 x 2.8 cm in size. The differential diagnosis includes postsurgical seroma and abscess.   2.  Post operative and degenerative changes as described above.      MR-PELVIS-WITH & W/O AND SEQUENCES   Final Result      1.  Surgical screw traverses both sacroiliac joint across presumed pathologic fracture of the right sacrum and the hardware obscures the adjacent tissues.      2.  No other evidence for bony metastatic disease      3.  Right gluteal musculature rim-enhancing fluid collection measuring 3.4 x 2.8 cm. This could be a postoperative seroma versus abscess      4.  osteoarthritis of both hip joint      5.  Prior hysterectomy      IR-MIDLINE CATHETER INSERTION WO GUIDANCE > AGE 3   Final Result                  Ultrasound-guided midline placement performed by qualified nursing staff    as above.          CT-HEAD WITH   Final Result      Multiple subcentimeter too numerous to count enhancing masses scattered throughout the supratentorial and infratentorial brain. These demonstrate some surrounding vasogenic edema and  most likely represent multifocal metastatic lesions. Other    considerations would include multifocal abscesses or septic emboli.      EC-CHAS W/O CONT   Final Result      EC-CHAS W/O CONT   Final Result      EC-ECHOCARDIOGRAM COMPLETE W/O CONT   Final Result      OUTSIDE IMAGES-CT CHEST/ABDOMEN/PELVIS   Final Result      OUTSIDE IMAGES-DX CHEST   Final Result      [id number] FILM X-RAY   Final Result      OUTSIDE IMAGES-MR BRAIN   Final Result      OUTSIDE IMAGES-MR BRAIN   Final Result           Assessment/Plan  * Lesion of brain   Assessment & Plan    Metastatic cancer vs infectious etiology  D/w Dr Black for questionable brain mets, recommending IR Bx of psoas muscle   IR Bx of psoas muscle ordered - was abscess  Empiric treatment of infection  TTE and CHAS negative for vegetations.  D/w Dr Nguyen - lesions likely metastatic based on his interpretation of MRI/CT - too small to biopsy, recommends continuing antibiotics and re-imaging in 2 weeks to see if there has been any change.  CT brain to be repeated - lesions same as prior 2 weeks ago.  Will likely need follow-up CT scan of the brain to assess effectiveness of antibiotics.  Continue with vancomycin and Merrem.  Cultures has been negative so far.  Monitor kidney functions closely and monitor vancomycin trough with a goal between 15 and 20.  Palliative consulted.       Encephalopathy   Assessment & Plan    Waxes and wanes.  Likely secondary to multiple brain lesions.  Avoid benzodiazepines and/or anticholinergic medications.  Avoid sedatives or hypnotics.         Mass of iliopsoas muscle group   Assessment & Plan    Return of fluid collection, repeat drainage in IR with cultures, drain placed 3/29 - 10ml purulent material drained and sent for culture - no growth a/o 3/30  Was on linezolid and cefepime and now on vanco/merrem - end date to be 6 weeks of treatment. (4/24/19)  ID following.  Repeat CT abdomen shows resolution of fluid collection and ÁNGEL  output dwindling - removed ÁNGEL drain  Cultures are negative so far.  CHAS negative for endocarditis.  Patient also has right hip ORIF.  Orthopedic surgery recommended hardware to stay for at least 6 months to promote adequate healing and to continue with antibiotics for now.  4/18: Repeat CT scan of the pelvic area ordered to follow-up on resolution and response to treatment.  4/19: Repeated CT abdomen and pelvis showed recurrence of the right gluteal and iliac muscle abscesses.  Discussed with ID and orthopedic surgery.  Recommended IR aspiration and drain placement with fluid analysis and culture.         Pancytopenia (HCC)   Assessment & Plan    Could be due to toxic effects of infection on bone marrow.  Afebrile.  ANC within normal limits.  Continue to monitor.       Hyponatremia- (present on admission)   Assessment & Plan    Mildly low - doubt contribution to mentation         RLS (restless legs syndrome)- (present on admission)   Assessment & Plan    Pramipexole       Diabetes mellitus type 2 in obese ([facility])- (present on admission)   Assessment & Plan    Well controlled   Short acting insulin as needed   Accu-Checks, hypoglycemia protocol          History of breast cancer- (present on admission)   Assessment & Plan    Status post mastectomy.  And none if brain lesions represent metastasis but less likely as repeat CT showed decrease in size with antibiotics wishes consistent with abscesses rather than metastasis.       COPD (chronic obstructive pulmonary disease) (HCC)- (present on admission)   Assessment & Plan    Oxygen as needed, Respiratory protocol, Bronchodilators, Incentive spirometry      Sinus tachycardia- (present on admission)   Assessment & Plan    Likely due to combination of infection and volume loss.  Echocardiogram with normal ejection fraction and no significant valvular abnormalities.  Check TSH.  Continue to monitor.     Urinary tract infection   Assessment & Plan    Culture remain no  growth.       Fungal infection of the groin   Assessment & Plan    on nystatin powder 3/24     History of deep vein thrombosis- (present on admission)   Assessment & Plan    History of deep vein thrombosis   Was on Rivaroxaban as outpatient.     Held for Bx Mar 11  Restart AC after completing abx treatment         Essential hypertension- (present on admission)   Assessment & Plan    Restarted on losartan and furosemide with hold parameters.     Fever   Assessment & Plan    PRN Tylenol.     GERD (gastroesophageal reflux disease)- (present on admission)   Assessment & Plan    Omeprazole     Chronic pain- (present on admission)   Assessment & Plan     Multifactorial  Likely secondary to abscess, fracture, bed bound status  Pain control       Plan of care discussed with multidisciplinary team during rounds.    VTE prophylaxis: scds

## 2019-04-20 NOTE — PROGRESS NOTES
Pharmacy Kinetics 68 y.o. female on vancomycin day # 27   2019    Currently on Vancomycin 600 mg iv q24hr ()     Indication for Treatment: possible brain abscesses      Pertinent history per medical record: Admitted on 3/8/2019 for iliopsoas mass and multiple brain lesions. There is concern that this is infectious. All cultures are negative to date, but were all drawn while on antibiotics. CHAS was negative. Patient has a history of breast cancer and DM. ID is following. Plan for completion of abx (6 weeks) in house prior to transfer to [facility] in [place].      Other antibiotics: Meropenem 2g IV q8h     Allergies: Patient has no known allergies.      List concerns for renal function: age, and prolonged vancomycin duration, elevated BUN/SCr ratio      Pertinent cultures to date:   19: wound - right gluteal mass: negative   19: blood - peripheral x 2: negative   19: wound - right buttock: negative   19: blood - peripheral x 2: negative   19: wound - retroperitoneal drain aspirate: negative   19:PBCx2:NGTD     MRSA nares swab if pneumonia is a concern (ordered/positive/negative/n-a): n/a    Recent Labs      19   0211  19   0202  19   0233   WBC  3.3*  3.3*  3.9*   NEUTSPOLYS  63.30  72.90*  59.20   BANDSSTABS   --   3.40   --      Recent Labs      19   0202   BUN  19   CREATININE  0.68   ALBUMIN  2.9*     Recent Labs      19   2141  19   1859   VANCOTROUGH  42.2*  18.5     Intake/Output Summary (Last 24 hours) at 19 0832  Last data filed at 19 2200   Gross per 24 hour   Intake              450 ml   Output                0 ml   Net              450 ml      BP (!) 185/91   Pulse (!) 104   Temp 36.8 °C (98.2 °F) (Temporal)   Resp 18   Ht 1.524 m (5')   Wt 64.2 kg (141 lb 8.6 oz)   SpO2 97%  Temp (24hrs), Av.4 °C (97.6 °F), Min:36.1 °C (97 °F), Max:36.8 °C (98.3 °F)         A/P        1. Vancomycin dose change:  none  2. Next vancomycin level: 2-3 days   3. Goal trough: 16-20 mcg/mL  4. Comments: Last level @ goal , renal labs appear stable. Afebrile over interval, cx unchanged. ID following , continue current dose.      Willy CandelariaD BCPS

## 2019-04-20 NOTE — PROGRESS NOTES
Orthopaedics  Patient seen and examined  No issues, no indication for surgery this weekend  Answered questions  Santos

## 2019-04-20 NOTE — CARE PLAN
Problem: Knowledge Deficit  Goal: Knowledge of disease process/condition, treatment plan, diagnostic tests, and medications will improve    Intervention: Explain information regarding disease process/condition, treatment plan, diagnostic tests, and medications and document in education  Pt educated to possible procedure in am and indication. PT verbalized understanding.       Problem: Skin Integrity  Goal: Risk for impaired skin integrity will decrease  Outcome: PROGRESSING AS EXPECTED  Pt on waffle mattress. Turned q2 hours. Barrier paste applied.

## 2019-04-20 NOTE — PROGRESS NOTES
Report received at bedside, assumed care. Pt is asleep in bed. A&O x 3. Pt denies pain. No other concerns, complains or distress. Tele box on. Chart reviewed and POC updated with patient. Bed in lowest position and call light within reach.

## 2019-04-20 NOTE — PROGRESS NOTES
IV Iron Per Pharmacy Note    Patient Lean Body Weight:  45.5 kg  Reason for Iron Replacement: Iron Deficiency Anemia      Lab Results   Component Value Date/Time    WBC 3.9 (L) 04/20/2019 02:33 AM    RBC 4.57 04/20/2019 02:33 AM    HEMOGLOBIN 11.2 (L) 04/20/2019 02:33 AM    HEMATOCRIT 36.7 (L) 04/20/2019 02:33 AM    MCV 80.3 (L) 04/20/2019 02:33 AM    MCH 24.5 (L) 04/20/2019 02:33 AM    MCHC 30.5 (L) 04/20/2019 02:33 AM    MPV 11.1 04/20/2019 02:33 AM       Recent Labs      04/19/19   0202   IRON  30*         Recent Labs      04/19/19   0202   CREATININE  0.68          Assessment/Plan:  1. IV Iron Indicated.   2. Give Iron Dextran 25 mg IV test dose following diphenhydramine/acetaminophen premeds over 30 minutes per protocol.  3. If no reaction (Anaphylaxis, Hypotension/Hypertension, N/V/D, Chest pain/Back Pain, Urticaria/Pruritis) in the next hour, proceed to full dose. Nursing to call the pharmacy IV room at ext. [phone] for full dose.  4. Full dose: Iron Dextran 950 mg IV over 4 hours. Continue to monitor for delayed ADR including: Arthralgia/myalgia, Headache/backache, chills/dizziness/malaise, moderate to high fever and n/v.      Willy CandelariaD BCPS

## 2019-04-21 PROBLEM — E83.52 HYPERCALCEMIA: Status: ACTIVE | Noted: 2019-04-21

## 2019-04-21 LAB
ALBUMIN SERPL BCP-MCNC: 2.7 G/DL (ref 3.2–4.9)
ALBUMIN/GLOB SERPL: 0.7 G/DL
ALP SERPL-CCNC: 231 U/L (ref 30–99)
ALT SERPL-CCNC: 14 U/L (ref 2–50)
ANION GAP SERPL CALC-SCNC: 4 MMOL/L (ref 0–11.9)
AST SERPL-CCNC: 40 U/L (ref 12–45)
BASOPHILS # BLD AUTO: 0.9 % (ref 0–1.8)
BASOPHILS # BLD: 0.04 K/UL (ref 0–0.12)
BILIRUB SERPL-MCNC: 0.5 MG/DL (ref 0.1–1.5)
BUN SERPL-MCNC: 24 MG/DL (ref 8–22)
CALCIUM SERPL-MCNC: 11.2 MG/DL (ref 8.5–10.5)
CHLORIDE SERPL-SCNC: 109 MMOL/L (ref 96–112)
CO2 SERPL-SCNC: 23 MMOL/L (ref 20–33)
CREAT SERPL-MCNC: 0.72 MG/DL (ref 0.5–1.4)
EOSINOPHIL # BLD AUTO: 0.4 K/UL (ref 0–0.51)
EOSINOPHIL NFR BLD: 9.4 % (ref 0–6.9)
ERYTHROCYTE [DISTWIDTH] IN BLOOD BY AUTOMATED COUNT: 52.4 FL (ref 35.9–50)
GLOBULIN SER CALC-MCNC: 4 G/DL (ref 1.9–3.5)
GLUCOSE SERPL-MCNC: 102 MG/DL (ref 65–99)
HCT VFR BLD AUTO: 36 % (ref 37–47)
HGB BLD-MCNC: 11.2 G/DL (ref 12–16)
IMM GRANULOCYTES # BLD AUTO: 0.02 K/UL (ref 0–0.11)
IMM GRANULOCYTES NFR BLD AUTO: 0.5 % (ref 0–0.9)
LYMPHOCYTES # BLD AUTO: 0.76 K/UL (ref 1–4.8)
LYMPHOCYTES NFR BLD: 17.8 % (ref 22–41)
MCH RBC QN AUTO: 25.1 PG (ref 27–33)
MCHC RBC AUTO-ENTMCNC: 31.1 G/DL (ref 33.6–35)
MCV RBC AUTO: 80.7 FL (ref 81.4–97.8)
MONOCYTES # BLD AUTO: 0.72 K/UL (ref 0–0.85)
MONOCYTES NFR BLD AUTO: 16.9 % (ref 0–13.4)
NEUTROPHILS # BLD AUTO: 2.32 K/UL (ref 2–7.15)
NEUTROPHILS NFR BLD: 54.5 % (ref 44–72)
NRBC # BLD AUTO: 0 K/UL
NRBC BLD-RTO: 0 /100 WBC
PLATELET # BLD AUTO: 129 K/UL (ref 164–446)
PMV BLD AUTO: 10.5 FL (ref 9–12.9)
POTASSIUM SERPL-SCNC: 4.6 MMOL/L (ref 3.6–5.5)
PROT SERPL-MCNC: 6.7 G/DL (ref 6–8.2)
RBC # BLD AUTO: 4.46 M/UL (ref 4.2–5.4)
SODIUM SERPL-SCNC: 136 MMOL/L (ref 135–145)
WBC # BLD AUTO: 4.3 K/UL (ref 4.8–10.8)

## 2019-04-21 PROCEDURE — 700105 HCHG RX REV CODE 258: Performed by: INTERNAL MEDICINE

## 2019-04-21 PROCEDURE — 99232 SBSQ HOSP IP/OBS MODERATE 35: CPT | Performed by: INTERNAL MEDICINE

## 2019-04-21 PROCEDURE — A9270 NON-COVERED ITEM OR SERVICE: HCPCS | Performed by: FAMILY MEDICINE

## 2019-04-21 PROCEDURE — A9270 NON-COVERED ITEM OR SERVICE: HCPCS | Performed by: INTERNAL MEDICINE

## 2019-04-21 PROCEDURE — 700111 HCHG RX REV CODE 636 W/ 250 OVERRIDE (IP): Performed by: INTERNAL MEDICINE

## 2019-04-21 PROCEDURE — 700102 HCHG RX REV CODE 250 W/ 637 OVERRIDE(OP): Performed by: INTERNAL MEDICINE

## 2019-04-21 PROCEDURE — 85025 COMPLETE CBC W/AUTO DIFF WBC: CPT

## 2019-04-21 PROCEDURE — 700102 HCHG RX REV CODE 250 W/ 637 OVERRIDE(OP): Performed by: FAMILY MEDICINE

## 2019-04-21 PROCEDURE — 99232 SBSQ HOSP IP/OBS MODERATE 35: CPT | Performed by: FAMILY MEDICINE

## 2019-04-21 PROCEDURE — 700105 HCHG RX REV CODE 258

## 2019-04-21 PROCEDURE — 770009 HCHG ROOM/CARE - ONCOLOGY SEMI PRI*

## 2019-04-21 PROCEDURE — 700111 HCHG RX REV CODE 636 W/ 250 OVERRIDE (IP): Performed by: HOSPITALIST

## 2019-04-21 PROCEDURE — 700111 HCHG RX REV CODE 636 W/ 250 OVERRIDE (IP): Performed by: FAMILY MEDICINE

## 2019-04-21 PROCEDURE — 80053 COMPREHEN METABOLIC PANEL: CPT

## 2019-04-21 PROCEDURE — 700105 HCHG RX REV CODE 258: Performed by: FAMILY MEDICINE

## 2019-04-21 RX ORDER — CINACALCET 30 MG/1
60 TABLET, FILM COATED ORAL DAILY
Status: DISCONTINUED | OUTPATIENT
Start: 2019-04-21 | End: 2019-04-30 | Stop reason: HOSPADM

## 2019-04-21 RX ORDER — AMLODIPINE BESYLATE 10 MG/1
10 TABLET ORAL
Status: DISCONTINUED | OUTPATIENT
Start: 2019-04-22 | End: 2019-04-30 | Stop reason: HOSPADM

## 2019-04-21 RX ORDER — FUROSEMIDE 40 MG/1
40 TABLET ORAL 2 TIMES DAILY
Status: DISCONTINUED | OUTPATIENT
Start: 2019-04-21 | End: 2019-04-21

## 2019-04-21 RX ORDER — HYDRALAZINE HYDROCHLORIDE 20 MG/ML
20 INJECTION INTRAMUSCULAR; INTRAVENOUS EVERY 6 HOURS PRN
Status: DISCONTINUED | OUTPATIENT
Start: 2019-04-21 | End: 2019-04-30 | Stop reason: HOSPADM

## 2019-04-21 RX ORDER — AMLODIPINE BESYLATE 5 MG/1
5 TABLET ORAL
Status: DISCONTINUED | OUTPATIENT
Start: 2019-04-21 | End: 2019-04-21

## 2019-04-21 RX ORDER — SODIUM CHLORIDE 9 MG/ML
INJECTION, SOLUTION INTRAVENOUS
Status: COMPLETED
Start: 2019-04-21 | End: 2019-04-21

## 2019-04-21 RX ADMIN — GABAPENTIN 300 MG: 300 CAPSULE ORAL at 05:12

## 2019-04-21 RX ADMIN — HYDRALAZINE HYDROCHLORIDE 10 MG: 20 INJECTION INTRAMUSCULAR; INTRAVENOUS at 08:53

## 2019-04-21 RX ADMIN — ACETAMINOPHEN 650 MG: 325 TABLET, FILM COATED ORAL at 09:56

## 2019-04-21 RX ADMIN — OMEPRAZOLE 20 MG: 20 CAPSULE, DELAYED RELEASE ORAL at 05:13

## 2019-04-21 RX ADMIN — QUETIAPINE FUMARATE 25 MG: 25 TABLET ORAL at 17:01

## 2019-04-21 RX ADMIN — MEROPENEM 2 G: 1 INJECTION, POWDER, FOR SOLUTION INTRAVENOUS at 01:24

## 2019-04-21 RX ADMIN — POTASSIUM CHLORIDE 40 MEQ: 1500 TABLET, EXTENDED RELEASE ORAL at 05:12

## 2019-04-21 RX ADMIN — DIPHENHYDRAMINE HCL 25 MG: 25 TABLET ORAL at 01:23

## 2019-04-21 RX ADMIN — HALOPERIDOL LACTATE 2 MG: 5 INJECTION, SOLUTION INTRAMUSCULAR at 02:33

## 2019-04-21 RX ADMIN — SODIUM CHLORIDE 500 ML: 9 INJECTION, SOLUTION INTRAVENOUS at 08:51

## 2019-04-21 RX ADMIN — KETOROLAC TROMETHAMINE 15 MG: 30 INJECTION, SOLUTION INTRAMUSCULAR at 15:21

## 2019-04-21 RX ADMIN — FLUCONAZOLE 200 MG: 100 TABLET ORAL at 05:12

## 2019-04-21 RX ADMIN — CINACALCET HYDROCHLORIDE 60 MG: 30 TABLET, FILM COATED ORAL at 17:01

## 2019-04-21 RX ADMIN — SODIUM CHLORIDE 25 MG: 9 INJECTION, SOLUTION INTRAVENOUS at 01:38

## 2019-04-21 RX ADMIN — HYDRALAZINE HYDROCHLORIDE 20 MG: 20 INJECTION INTRAMUSCULAR; INTRAVENOUS at 18:32

## 2019-04-21 RX ADMIN — VANCOMYCIN HYDROCHLORIDE 600 MG: 100 INJECTION, POWDER, LYOPHILIZED, FOR SOLUTION INTRAVENOUS at 21:35

## 2019-04-21 RX ADMIN — PRAMIPEXOLE DIHYDROCHLORIDE 0.25 MG: 0.25 TABLET ORAL at 21:36

## 2019-04-21 RX ADMIN — MEROPENEM 2 G: 1 INJECTION, POWDER, FOR SOLUTION INTRAVENOUS at 17:01

## 2019-04-21 RX ADMIN — GABAPENTIN 300 MG: 300 CAPSULE ORAL at 17:01

## 2019-04-21 RX ADMIN — MEROPENEM 2 G: 1 INJECTION, POWDER, FOR SOLUTION INTRAVENOUS at 08:51

## 2019-04-21 RX ADMIN — LOSARTAN POTASSIUM 50 MG: 50 TABLET ORAL at 05:13

## 2019-04-21 RX ADMIN — ACETAMINOPHEN 650 MG: 325 TABLET, FILM COATED ORAL at 18:46

## 2019-04-21 RX ADMIN — SODIUM CHLORIDE 950 MG: 9 INJECTION, SOLUTION INTRAVENOUS at 03:44

## 2019-04-21 RX ADMIN — MEROPENEM 2 G: 1 INJECTION, POWDER, FOR SOLUTION INTRAVENOUS at 23:54

## 2019-04-21 ASSESSMENT — ENCOUNTER SYMPTOMS
DIAPHORESIS: 0
PHOTOPHOBIA: 0
HEADACHES: 0
BACK PAIN: 1
SPUTUM PRODUCTION: 0
BACK PAIN: 0
SHORTNESS OF BREATH: 0
NAUSEA: 0
DIARRHEA: 0
FEVER: 0
DOUBLE VISION: 0
WEAKNESS: 1
VOMITING: 0
CHILLS: 0
HEMOPTYSIS: 0
SENSORY CHANGE: 0
HEARTBURN: 0
DIZZINESS: 0
COUGH: 0
MYALGIAS: 0
NECK PAIN: 0
EYE PAIN: 0
BRUISES/BLEEDS EASILY: 0
BLURRED VISION: 0
ABDOMINAL PAIN: 1
ORTHOPNEA: 0
CLAUDICATION: 0
PALPITATIONS: 0
SPEECH CHANGE: 0
DEPRESSION: 0
TINGLING: 0

## 2019-04-21 ASSESSMENT — LIFESTYLE VARIABLES: SUBSTANCE_ABUSE: 0

## 2019-04-21 NOTE — PROGRESS NOTES
Infectious Disease Progress Note    Author: Ashely Hinojosa M.D. Date & Time of service: 4/21/2019  11:22 AM    Chief Complaint:  Multiple brain lesions  Iliopsoas lesion    Interval History:  68-year-old female with history of diabetes and breast cancer, admitted 3/8/2019 with abdominal pain, iliopsoas lesion, and an abnormal MRI with multiple brain lesions.  4/1/2019 no fevers.  Easily arousable.  ÁNGEL continues to drain.  4/2/2019-no fevers.  Awake and responsive but feels weak.  There is minimal drainage from the ÁNGEL.  As per the nursing the patient is incontinent of stool.  But when you talk to her she states she is just is not able to get up on time.  4/3/2019-complains of some weakness in the hand. No fevers. The drain is not draining much  4/4- no fevers. She is anxious since she has been NPO. Awaiting IR drain  4/5 no fevers.  Got her drain.  There was purulent fluid.  WBCs 4.6.  4/8 afebrile, white count 3.3.  IR drain remains in place.  Continues to have significant word finding difficulty.  4/9 per RN, patient more awake and interactive but remains confused with word finding difficulty.  Resting comfortably this morning  4/10 afebrile, white count 4.6.  Patient much more awake and interactive today.  Reports no new issues, reports no pain anywhere.  IR drain has been removed.  4/11 afebrile WBC 4.2 patient complains of pain in her legs and feet.  She is being assisted with feeding.  Denies any headaches or neck pain  4/12/2019 no fevers.  Patient is alert and oriented.  Although really slow.  Apparently when she gets pain medications she becomes confused.  But this morning she is answering all questions appropriately.  4/13/2019 patient is somewhat confused.  No fevers.  No new issues overnight.  4/14/2019 no fevers.  Somewhat confused.  WBC is 4.  4/15/2019 no fevers.  Complains of back pain and joint pain.  WBCs 5.5  4/16/2019 patient was transferred to telemetry overnight.  Much more  lethargic.  2019-no fevers.  Wants to know the plan.  More awake and responsive.  2019 patient has no fevers.  She is drenching and sweats.  Complains of hip pain.  2019-low-grade fevers.  No new issues overnight.  Wants to walk.  Complains of pain.   afebrile WBC 3.9 patient is awake and answering questions appropriately.  She does endorse some pain on the right hip.  CT revealed enhancing fluid collection in the right gluteal muscle consistent with a small abscess and a loculated enhancing fluid collection in the right iliac muscle.  Plan for IR drainage today   afebrile WBC 4.3 patient transferred to oncology.  She is resting comfortably without any complaint.  Drain was not placed yesterday    Labs Reviewed    Review of Systems:  Review of Systems   Constitutional: Positive for malaise/fatigue. Negative for diaphoresis and fever.   HENT: Negative for hearing loss.    Respiratory: Negative for cough and shortness of breath.    Cardiovascular: Negative for chest pain and leg swelling.   Gastrointestinal: Negative for nausea and vomiting.   Genitourinary: Negative for dysuria.   Musculoskeletal: Positive for back pain and joint pain. Negative for myalgias.        Right hip   Neurological: Negative for sensory change and speech change.   All other systems reviewed and are negative.      Hemodynamics:  Temp (24hrs), Av.7 °C (98.1 °F), Min:36.1 °C (97 °F), Max:37.2 °C (99 °F)  Temperature: 37.1 °C (98.7 °F)  Pulse  Av.4  Min: 59  Max: 153  Blood Pressure : (!) 180/88       Physical Exam:  Physical Exam   Constitutional: She is oriented to person, place, and time. She appears well-developed and well-nourished. No distress.   Elderly   HENT:   Head: Normocephalic and atraumatic.   Eyes: Pupils are equal, round, and reactive to light. EOM are normal. Right eye exhibits no discharge. Left eye exhibits no discharge.   Neck: Neck supple.   Cardiovascular: Normal rate and intact distal  pulses.    Murmur heard.  Pulmonary/Chest: Effort normal and breath sounds normal. No respiratory distress. She has no wheezes.   Abdominal: Soft. Bowel sounds are normal. She exhibits no distension. There is no tenderness.   Musculoskeletal: She exhibits no edema, tenderness or deformity.   Right upper extremity PICC line   Lymphadenopathy:     She has no cervical adenopathy.   Neurological: She is alert and oriented to person, place, and time.   Moves all extremities   Skin: Skin is warm. No rash noted.   Nursing note and vitals reviewed.    Meds:    Current Facility-Administered Medications:   •  acetaminophen  •  fluconazole  •  acetaminophen  •  hydrALAZINE  •  ketorolac  •  meropenem  •  haloperidol lactate  •  QUEtiapine  •  gabapentin  •  labetalol  •  loperamide  •  losartan  •  MD Alert...Vancomycin per Pharmacy  •  Notify provider if pain remains uncontrolled **AND** Use the numeric rating scale (NRS-11) on regular floors and Critical-Care Pain Observation Tool (CPOT) on ICUs/Trauma to assess pain **AND** Pulse Ox (Oximetry) **AND** Pharmacy Consult Request **AND** If patient difficult to arouse and/or has respiratory depression, stop any opiates that are currently infusing and call a Rapid Response. **AND** [DISCONTINUED] oxyCODONE immediate-release **AND** [DISCONTINUED] oxyCODONE immediate-release **AND** [DISCONTINUED] morphine injection  •  omeprazole  •  ondansetron  •  ondansetron  •  potassium chloride SA  •  pramipexole  •  Respiratory Care per Protocol  •  vancomycin    Labs:  Recent Labs      04/19/19   0202  04/20/19   0233  04/21/19   0222   WBC  3.3*  3.9*  4.3*   RBC  5.22  4.57  4.46   HEMOGLOBIN  12.6  11.2*  11.2*   HEMATOCRIT  42.4  36.7*  36.0*   MCV  81.2*  80.3*  80.7*   MCH  24.1*  24.5*  25.1*   RDW  50.9*  50.8*  52.4*   PLATELETCT  100*  123*  129*   MPV   --   11.1  10.5   NEUTSPOLYS  72.90*  59.20  54.50   LYMPHOCYTES  13.60*  15.70*  17.80*   MONOCYTES  0.80  17.00*  16.90*  "  EOSINOPHILS  7.60*  6.30  9.40*   BASOPHILS  0.90  1.00  0.90   RBCMORPHOLO  Present   --    --      Recent Labs      04/19/19   0202   SODIUM  133*   POTASSIUM  4.9   CHLORIDE  104   CO2  22   GLUCOSE  78   BUN  19     Recent Labs      04/19/19   0202   ALBUMIN  2.9*   TBILIRUBIN  0.5   ALKPHOSPHAT  269*   TOTPROTEIN  7.4   ALTSGPT  15   ASTSGOT  39   CREATININE  0.68       Imaging:  CT abdomen and pelvis on 4/19/19  1.  Enhancing fluid collection in the right gluteal muscle, appearance compatible with small abscess.  2.  Loculated enhancing fluid collection in the right iliac is muscle, corresponding with site of prior drain, appearance compatible with reaccumulation of abscess.  3.  Fluid-filled distention of small bowel suggests ileus  4.  Atherosclerosis  5.  Trace pericardial effusion      Micro:  Results     Procedure Component Value Units Date/Time    BLOOD CULTURE [[id number]] Collected:  04/18/19 1536    Order Status:  Completed Specimen:  Blood from Peripheral Updated:  04/19/19 0813     Significant Indicator NEG     Source BLD     Site PERIPHERAL     Blood Culture No Growth    Note: Blood cultures are incubated for 5 days and  are monitored continuously.Positive blood cultures  are called to the RN and reported as soon as  they are identified.      Narrative:       Per Hospital Policy: Only change Specimen Src: to \"Line\" if  specified by physician order.    BLOOD CULTURE [[id number]] Collected:  04/18/19 1536    Order Status:  Completed Specimen:  Blood from Peripheral Updated:  04/19/19 0813     Significant Indicator NEG     Source BLD     Site PERIPHERAL     Blood Culture No Growth    Note: Blood cultures are incubated for 5 days and  are monitored continuously.Positive blood cultures  are called to the RN and reported as soon as  they are identified.      Narrative:       Per Hospital Policy: Only change Specimen Src: to \"Line\" if  specified by physician order.    URINALYSIS [[id number]]     Order " Status:  No result Specimen:  Urine from Urine, Clean Catch           Assessment:  Active Hospital Problems    Diagnosis   • *Lesion of brain [G93.9]   • Mass of iliopsoas muscle group [M62.89]   • Encephalopathy [G93.40]   •    • History of breast cancer [Z85.3]   • Diabetes mellitus type 2 in obese (HCC) [E11.69, E66.9]       Plan:  Multiple brain lesions-likely abscesses versus metastatic lesions  Leukopenia  Confusion improving  MRI with scattered small lesions incl aaron, too small to biopsy per neurosurgery  TTE neg; CHAS neg  Bcxs neg to date  Repeat CT scan on 3/30/2019 shows decrease in the size of the brain lesions, supporting that these are abscesses  Repeat MRI brain prior to completion of abx    Iliopsoas abscess, on treatment.  Persistent and worse.  Ongoing workup  CT + 2.6 cm lesion in the right iliopsoas region  S/p aspiration +WBCs-cultures negative to date  Bcxs remain neg  Last vanc trough of 20.4 and 4/5  D/c cefepime 03/26 - may be contributing to confusion  Tolerating Meropenem started 03/26  MRI w/ contrast lumbar, pelvis 03/27 - Iliopsoas and gluteal fluid collections noted.    Drain placed 3/29/2019.  Cultures negative  Another drain was placed on 4/4/2019 and it shows purulent fluid  Repeat CT from 4/8 with resolution of the fluid collections after drainage  Repeat CT on 4/19 revealed right gluteal muscle abscess in addition to recurrent abscess in the right iliac muscle.  Plan for IR drainage     Fevers, improved  Repeat blood cultures x2 are negative from 4/18/2019  UA shows some yeast  Repeat CT scan of the pelvis shows reaccumulation of the abscesses  I recommend re-drainage as well as orthopedic follow-up as soon as possible for hardware removal  Give short course of Diflucan.  Plan for 5-day course total.  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  Recommend hardware removal.   However per orthopedics this hardware needs to be in  for 6 months.  Antibiotics per above    Type 2 diabetes mellitus  Hemoglobin A1c 6.3  Keep BS under 150 to help control current infection    Altered mental status, improved overall  Appears to be waxing and waning  Multifactorial, likely large contribution from her multiple brain abscesses, monitor  The CT scan done on 4/13/2019 shows persistence of the lesions    H/o breast cancer

## 2019-04-21 NOTE — PROGRESS NOTES
"[facility] Medicine Daily Progress Note    Date of Service  4/20/2019    Chief Complaint  68 y.o. female admitted 3/8/2019 with right hip pain, fever and abnormal brain mri.    Hospital Course    Patient started on empiric antibiotics given fever.  She had abnormal findings on MRI brain and CT showed \"lesion\" on right iliopsoas.  This lesion was biopsied and turned out to be abscess.  She has history of breast cancer and there is also concern of brain lesions being metastatic though their appearance is not typical of this.      Interval Problem Update  3/12 Discussed with Dr Turner for second opinion of brain lesions and based on appearance not able to r/o or r/i any diagnosis.  Given patient's presentation of confusion, hip pain and fever  - this is more supportive of infectious etiology rather than malignant.  Fluid from right gluteal area sent to micro and as of yet - no growth.  Continue zosyn for now.  3/13 Patient with slight improvement in mentation.  Blood cultures and abscess cultures are showing no growth at this time.  TTE being done while I examined patient - no evidence of vegetations on this test.  ID consultation pending - d/w Dr Garcia.  3/14 Patient feeling well today, her pain is present but not as bad as prior to admission.  She was able to follow the conversation today and is agreeable to move forward with the CHAS tomorrow.  I spoke with her brother, René, and updated him on condition yesterday afternoon also.  Will also have PICC placed in next few days as long as blood cultures remain negative as I expect a prolonged antibiotic course of treatment.  3/15 Patient without new complaints, states she needs help to move in bed.  CHAS showed no evidence of vegetations and regular diet resumed.  Culture remains negative and biopsy of brain lesion may prove needed - will watch through the weekend and if mentation does not continue to improve, will discuss with neurosurgery on Monday.  3/16 Patient states " she is okay today, mentation has waxed and waned without significant improvement.  She was febrile earlier today.  No other acute events.  3/17 Patient with significant improvement in mentation today, she is aware of her hospitalization, not falling asleep mid sentence.  Her pain mediations were held most of yesterday and likely far too strong for patient and were affecting her awareness.  Oxycodone 5 discontinued and will use tramadol instead unless pain not well controlled.  CT brain with contrast ordered as a quick scan for comparison to MRI.    3/18 Patient feeling same today, discussed need to discuss with neurosurgery for possible biopsy.  Discussed with Dr Nguyen, he reviewed MRI and CT brain and he feels they are likely metastatic lesions but are far too small to biopsy.  His recommendation is to continue with antibiotics and re-image in 2 weeks to see if any change that would be amenable to biopsy or that would further declare infection.  3/26 Patient mental status continues to wax and wane.  ID ordered MRI lumbar, pelvis and sacrum for evaluation for source of infection, patient with continued low back pain and no real improvement since I saw her 7 days prior with continued antibiotics during this time.  Cultures have not growth pathogen, repeat MRI 3/28.   3/27 Patient somnolent most of the day today, MRI's completed and showing 2 areas of fluid collections, given her history are likely abscesses and will require drainage, CT guided drainage requested and patient NPO at MN for this.  D/w ID - cultures will be done on fluid collection.  Repeat CT brain ordered for tomorrow.  3/28 Patient up to chair, diet resumed as lovenox given this am and drainage of abscesses deferred until tomorrow.  Vanco/merrem to continue and cultures will be collected from abscesses.  3/29 Patient went to IR today, had drain placed in the right buttock into abscess cavity and fluid sent for culture.  Potassium is slightly low,  continue with PO replacement.  HR has improved from yesterday but patient PO intake still not at normal level, increase IVF rate to 100cc/hour.  3/30 Patient without fever since drain placed right buttock.  Purulent material in ÁNGEL bulb. Cultures thus far are still showing no growth.  Antibiotics to continue.  CT head ordered to evaluate change in past 2 weeks as recommended by neurosurgery.  3/31 Patient with fevers overnight - was altered during this time but has recovered.  She denies pain when examined by me.  She still has purulent material in the drainage tubing.  Will continue with current IV antibiotics - 6 weeks total antibiotics suspected - end date would be 4/24/19.  D/w ID.  4/1 Patient remains intermittently somnolent.  She wakes easily but falls back to sleep quickly.  She remained afebrile overnight.  ÁNGEL drain continues to drain purulent material.  ABX through 4/24 - once accepting facility found, patient okay to transfer with midline intact for completion of antibiotics.    4/9 Patient somnolent when examined.  CT showed resolution of fluid collection where ÁNGEL in place.  RN to remove ÁNGEL today.  All cultures still remain without growth.  4/10 Patient seen when working with OT, she is awake but gets off task quickly and is easily fatigued.  ÁNGEL drain removed yesterday.  Patient states she has pain in the lumbar spine when she coughs - just above her surgical area.  She has been in bed for nearly 1 month and this is likely not helping her back pain - educated need for OOB as often as tolerated.  4/11 Patient had bowel incontinence prior to my entering room, states she thinks she needs a bed pan.  CNA notified.  She continues to have low back pain in the area of her surgery and given her bed-bound status during her infection and intermittent confusion, this is not unexpected.  Continuing with therapy recommended and patient is 1:1 feeding given her weakness to feed herself.  4/12 Patient tearful this am,  she is concerned that she will not walk again, support given and educated that she is weak and the inability to walk is only temporary.  She needs to work with therapy and get stronger and I need to stop her narcotics and only give tyelnol/ibuprofen for pain management along with non-narcotic alternatives.  I spoke to patient's brother, René, at her request.  4/13 Patient up to chair when evaluated, mentation improved with discontinuation of narcotics. Brother is coming to visit this weekend and she is looking forward to this.  Ortho recommendations still pending - per ID she spoke to Dr Porter who is consulting Dr Sandra for recommendations.  4/14 Patient up to chair with assistance today.  CT brain remains same as prior 2 weeks ago.  Still pending ortho consultation.  4/15 Patient to be seen by ortho today per Dr Porter.  D/w dietary - patient with weight loss and poor po intake, she needs to be a 1:1 feed patient and if she fails this, will likely need tube feeding to get adequate nutrition.  4/16: Lethargic.  Opens eyes to physical stimulus but drifts quickly back to sleep.  Has been tachycardic with soft blood pressure in the morning.  Lasix held.  Palliative consulted.  4/17: Awake and alert this morning.  Interactive.  Follows simple commands and answer simple questions.  Disoriented to time.  No distress noted.  No issues overnight.  4/18: Awake this morning and interactive.  Slow to respond.  Still disoriented to time.  Can answer simple questions and follow simple commands.  Pelvic CT scan ordered to follow-up on pelvic abscess.  4/19: Repeated CT scan of the abdominal pelvis showed recurrence of the right gluteal and iliac muscle abscess.  Discussed with orthopedic surgery and ID who recommended IR CT-guided aspiration and possible drainage placement.  I tried to contact the brother to update him but the phone number was incorrect.  Patient was sitting up in chair comfortably.  She was anxious  after was informed about her CT results.  She is asking when can she be discharged.  No distress noted however.  No issues overnight per  4/20: Sitting up in chair comfortably.  Per IR, abscesses were too small to be drained.  Afebrile overnight.  Tolerating p.o. fairly.  No issues overnight.  Consultants/Specialty  ID   Ortho -   Palliative  Code Status  full    Disposition  SNF/[facility] in [place]    Review of Systems  Review of Systems   Constitutional: Positive for malaise/fatigue. Negative for chills and fever.   HENT: Negative for ear pain, hearing loss and tinnitus.    Eyes: Negative for blurred vision, double vision and photophobia.   Respiratory: Negative for cough and hemoptysis.    Cardiovascular: Negative for chest pain, palpitations and orthopnea.   Gastrointestinal: Positive for abdominal pain. Negative for heartburn, nausea and vomiting.   Genitourinary: Negative for dysuria and urgency.   Musculoskeletal: Negative for myalgias and neck pain. Back pain: lumbar/sacral area.   Skin: Negative for rash.   Neurological: Positive for weakness. Negative for dizziness and headaches.   Endo/Heme/Allergies: Does not bruise/bleed easily.   Psychiatric/Behavioral: Negative for depression and suicidal ideas.        Physical Exam  Temp:  [36.3 °C (97.3 °F)-37.7 °C (99.8 °F)] 36.8 °C (98.2 °F)  Pulse:  [] 95  Resp:  [17-18] 17  BP: (135-185)/(73-96) 135/73  SpO2:  [92 %-97 %] 92 %    Physical Exam   Constitutional: No distress.   HENT:   Head: Normocephalic and atraumatic.   Mouth/Throat: No oropharyngeal exudate.   Eyes: Pupils are equal, round, and reactive to light. Conjunctivae are normal. Right eye exhibits no discharge. Left eye exhibits no discharge.   Neck: Neck supple. No thyromegaly present.   Cardiovascular: Normal rate, regular rhythm and normal heart sounds.  Exam reveals no gallop and no friction rub.    Pulmonary/Chest: Effort normal and breath sounds normal. No respiratory distress. She has no  wheezes.   Abdominal: Soft. Bowel sounds are normal. She exhibits no distension. There is no tenderness.   Musculoskeletal: She exhibits no tenderness or deformity.          Neurological: She is alert. She is disoriented. No cranial nerve deficit.        Skin: Skin is warm. She is not diaphoretic. No erythema. There is pallor.   Psychiatric: She has a normal mood and affect. She is slowed.   Nursing note and vitals reviewed.      Fluids    Intake/Output Summary (Last 24 hours) at 04/20/19 1942  Last data filed at 04/19/19 2200   Gross per 24 hour   Intake              200 ml   Output                0 ml   Net              200 ml       Laboratory  Recent Labs      04/18/19   0211  04/19/19   0202  04/20/19   0233   WBC  3.3*  3.3*  3.9*   RBC  4.51  5.22  4.57   HEMOGLOBIN  11.1*  12.6  11.2*   HEMATOCRIT  36.5*  42.4  36.7*   MCV  80.9*  81.2*  80.3*   MCH  24.6*  24.1*  24.5*   MCHC  30.4*  29.7*  30.5*   RDW  50.6*  50.9*  50.8*   PLATELETCT  84*  100*  123*   MPV  10.2   --   11.1     Recent Labs      04/19/19   0202   SODIUM  133*   POTASSIUM  4.9   CHLORIDE  104   CO2  22   GLUCOSE  78   BUN  19   CREATININE  0.68   CALCIUM  10.6*     Recent Labs      04/20/19   0756   INR  1.06               Imaging  CT-ABDOMEN-PELVIS WITH   Final Result         1.  Enhancing fluid collection in the right gluteal muscle, appearance compatible with small abscess.   2.  Loculated enhancing fluid collection in the right iliac is muscle, corresponding with site of prior drain, appearance compatible with reaccumulation of abscess.   3.  Fluid-filled distention of small bowel suggests ileus   4.  Atherosclerosis   5.  Trace pericardial effusion      CT-CTA CHEST PULMONARY ARTERY W/ RECONS   Final Result         1.  No large central pulmonary embolus is appreciated, evaluation of the subsegmental branches is essentially nondiagnostic due to motion artifacts. Additional imaging would be required for definitive exclusion of small  distal pulmonary emboli.   2.  Trace pericardial effusion   3.  Hepatomegaly   4.  Atherosclerosis.   5.  Right pulmonary nodules, the largest is a 7.7 mm pulmonary nodule, see nodule follow-up recommendations below.      Low Risk: CT at 3-6 months, then consider CT at 18-24 months      High Risk: CT at 3-6 months, then at 18-24 months      Comments: Use most suspicious nodule as guide to management. Follow-up intervals may vary according to size and risk.      Low Risk - Minimal or absent history of smoking and of other known risk factors.      High Risk - History of smoking or of other known risk factors.      Note: These recommendations do not apply to lung cancer screening, patients with immunosuppression, or patients with known primary cancer.      Fleischner Society 2017 Guidelines for Management of Incidentally Detected Pulmonary Nodules in Adults         DX-CHEST-PORTABLE (1 VIEW)   Final Result         1.  Mild pulmonary edema and/or infiltrate   2.  Cardiomegaly      CT-HEAD WITH & W/O   Final Result      1.  No change in scattered hyperenhancing lesions throughout the brain, cerebellum and visualized brainstem. Some of them demonstrate mild associated edema, similar to prior study.   2.  No CT evidence of infarct or hemorrhage.      CT-NEEDLE BX-MUSCLE RIGHT   Final Result   Addendum 1 of 1   Addendum:   The biopsy/drainage was done utilizing low dose optimization CT techniques    including auto modulation for  imaging and low mA CT/fluoroscopy mode    for the procedure.      Final      CT-guided aspiration of pus like material in the right gluteal muscle lesion.      CT-ABDOMEN-PELVIS WITH   Final Result      1.  Resolution of right iliacus abscess with no associated fluid adjacent to the drainage catheter      2.  Interval removal of right gluteal drainage catheter      3.  Surgical screw traversing both sacroiliac joint with associated pathologic fracture of the right ilium and right medial sacrum       4.  Hepatomegaly      CT-DRAIN-HEMATOMA - SEROMA   Final Result      1.  CT-GUIDED RETROPERITONEAL (EXTRAPERITONEAL) RIGHT PELVIC ABSCESS DRAINAGE AT THE RIGHT ILIOPSOAS. ORDERS WERE WRITTEN FOR ONCE DAILY IRRIGATION OF THE CATHETER WITH 5 ML STERILE SALINE.      2.  THE CURRENT PLAN IS TO MONITOR DRAINAGE OUTPUT AND OBTAIN A FOLLOWUP CT SCAN IN 5-7 DAYS IF CLINICALLY INDICATED.      CT-ABDOMEN-PELVIS WITH   Final Result      1.  Rim-enhancing fluid collection in the right iliopsoas muscle may have increased in size slightly from the prior exam.      2.  Pigtail catheter in the right gluteus medius muscle. No residual fluid collection is appreciated.      3.  Pathologic fracture of the right ilium. Status post right SI joint fusion.      4.  Atherosclerosis.      5.  Cholelithiasis.      CT-HEAD WITH & W/O   Final Result      1.  Interval decrease in size and level of enhancement of multiple small punctate foci in both cerebral hemispheres and in the midbrain consistent with improving septic emboli.      2.  No hemorrhage or mass effect.      CT-DRAIN-RETROPERITONEAL   Final Result      1.  CT GUIDED CATHETER DRAINAGE OF RIGHT GLUTEAL ABSCESS.   2.  THE CURRENT PLAN IS TO OBTAIN A FOLLOW-UP CT SCAN IN 5-7 DAYS IF INDICATED. ORDERS WERE WRITTEN FOR ONCE DAILY IRRIGATION OF THE CATHETER WITH 5 ML STERILE SALINE FOR 3 DAYS.   3. THE ILIOPSOAS COLLECTION WAS NOT AMENABLE TO CATHETER DRAINAGE AS INTERVENING BOWEL AND/OR BONY STRUCTURES OBSTRUCT A PATHWAY TO THIS COLLECTION AT THIS TIME.      MR-LUMBAR SPINE-WITH & W/O   Final Result      1.  There are multifocal enhancing fluid collections in the right iliopsoas muscles and gluteus muscles adjacent to the right ilium. Right iliopsoas fluid collection measures an approximately 3.9 x 2.9 cm. The right gluteal fluid collection measures an    approximately 3.5 x 2.8 cm in size. The differential diagnosis includes postsurgical seroma and abscess.   2.  Post operative and  degenerative changes as described above.      MR-PELVIS-WITH & W/O AND SEQUENCES   Final Result      1.  Surgical screw traverses both sacroiliac joint across presumed pathologic fracture of the right sacrum and the hardware obscures the adjacent tissues.      2.  No other evidence for bony metastatic disease      3.  Right gluteal musculature rim-enhancing fluid collection measuring 3.4 x 2.8 cm. This could be a postoperative seroma versus abscess      4.  osteoarthritis of both hip joint      5.  Prior hysterectomy      IR-MIDLINE CATHETER INSERTION WO GUIDANCE > AGE 3   Final Result                  Ultrasound-guided midline placement performed by qualified nursing staff    as above.          CT-HEAD WITH   Final Result      Multiple subcentimeter too numerous to count enhancing masses scattered throughout the supratentorial and infratentorial brain. These demonstrate some surrounding vasogenic edema and most likely represent multifocal metastatic lesions. Other    considerations would include multifocal abscesses or septic emboli.      EC-CHAS W/O CONT   Final Result      EC-CHAS W/O CONT   Final Result      EC-ECHOCARDIOGRAM COMPLETE W/O CONT   Final Result      OUTSIDE IMAGES-CT CHEST/ABDOMEN/PELVIS   Final Result      OUTSIDE IMAGES-DX CHEST   Final Result      RN-[id number] FILM X-RAY   Final Result      OUTSIDE IMAGES-MR BRAIN   Final Result      OUTSIDE IMAGES-MR BRAIN   Final Result           Assessment/Plan  * Lesion of brain   Assessment & Plan    Metastatic cancer vs infectious etiology  D/w Dr Black for questionable brain mets, recommending IR Bx of psoas muscle   IR Bx of psoas muscle ordered - was abscess  Empiric treatment of infection  TTE and CHAS negative for vegetations.  D/w Dr Nguyen - lesions likely metastatic based on his interpretation of MRI/CT - too small to biopsy, recommends continuing antibiotics and re-imaging in 2 weeks to see if there has been any change.  CT brain to be repeated  - lesions same as prior 2 weeks ago.  Will likely need follow-up CT scan of the brain to assess effectiveness of antibiotics.  Continue with vancomycin and Merrem.  Cultures has been negative so far.  Monitor kidney functions closely and monitor vancomycin trough with a goal between 15 and 20.  Palliative consulted.       Encephalopathy   Assessment & Plan    Waxes and wanes.  Likely secondary to multiple brain lesions.  Avoid benzodiazepines and/or anticholinergic medications.  Avoid sedatives or hypnotics.  At baseline for now.         Mass of iliopsoas muscle group   Assessment & Plan    Return of fluid collection, repeat drainage in IR with cultures, drain placed 3/29 - 10ml purulent material drained and sent for culture - no growth a/o 3/30  Was on linezolid and cefepime and now on vanco/merrem - end date to be 6 weeks of treatment. (4/24/19)  ID following.  Repeat CT abdomen shows resolution of fluid collection and ÁNGEL output dwindling - removed ÁNGEL drain  Cultures are negative so far.  CHAS negative for endocarditis.  Patient also has right hip ORIF.  Orthopedic surgery recommended hardware to stay for at least 6 months to promote adequate healing and to continue with antibiotics for now.  4/18: Repeat CT scan of the pelvic area ordered to follow-up on resolution and response to treatment.  4/19: Repeated CT abdomen and pelvis showed recurrence of the right gluteal and iliac muscle abscesses.  Discussed with ID and orthopedic surgery.  Recommended IR aspiration and drain placement with fluid analysis and culture.  4/20: Patient did not qualify for IR aspiration and drain placement for the above-mentioned abscesses as they were too small.         Pancytopenia (HCC)   Assessment & Plan    Could be due to toxic effects of infection on bone marrow.  Afebrile.  ANC within normal limits.  Continue to monitor.       Hyponatremia- (present on admission)   Assessment & Plan    Mildly low - doubt contribution to  mentation         RLS (restless legs syndrome)- (present on admission)   Assessment & Plan    Pramipexole       Diabetes mellitus type 2 in obese (HCC)- (present on admission)   Assessment & Plan    Well controlled   Short acting insulin as needed   Accu-Checks, hypoglycemia protocol          History of breast cancer- (present on admission)   Assessment & Plan    Status post mastectomy.  And none if brain lesions represent metastasis but less likely as repeat CT showed decrease in size with antibiotics wishes consistent with abscesses rather than metastasis.       COPD (chronic obstructive pulmonary disease) (HCC)- (present on admission)   Assessment & Plan    Oxygen as needed, Respiratory protocol, Bronchodilators, Incentive spirometry      Sinus tachycardia- (present on admission)   Assessment & Plan    Likely due to combination of infection and volume loss.  Echocardiogram with normal ejection fraction and no significant valvular abnormalities.  Check TSH.  Continue to monitor.     Urinary tract infection   Assessment & Plan    Culture remain no growth.       Fungal infection of the groin   Assessment & Plan    on nystatin powder 3/24     History of deep vein thrombosis- (present on admission)   Assessment & Plan    History of deep vein thrombosis   Was on Rivaroxaban as outpatient.     Held for Bx Mar 11  Restart AC after completing abx treatment         Essential hypertension- (present on admission)   Assessment & Plan    Restarted on losartan and furosemide with hold parameters.     Fever   Assessment & Plan    PRN Tylenol.     GERD (gastroesophageal reflux disease)- (present on admission)   Assessment & Plan    Omeprazole     Chronic pain- (present on admission)   Assessment & Plan     Multifactorial  Likely secondary to abscess, fracture, bed bound status  Pain control       Plan of care discussed with multidisciplinary team during rounds.    VTE prophylaxis: scds

## 2019-04-21 NOTE — PROGRESS NOTES
Pharmacy Kinetics 68 y.o. female on vancomycin day # 28 2019    Currently on Vancomycin 600 mg iv q24hr ()     Indication for Treatment: possible brain abscesses      Pertinent history per medical record: Admitted on 3/8/2019 for iliopsoas mass and multiple brain lesions. There is concern that this is infectious. All cultures are negative to date, but were all drawn while on antibiotics. CHAS was negative. Patient has a history of breast cancer and DM. ID is following. Plan for completion of abx (6 weeks) in house prior to transfer to [facility] in [place].      Other antibiotics: Meropenem 2g IV q8h, Diflucan 200mg po daily     Allergies: Patient has no known allergies.      List concerns for renal function: age, and prolonged vancomycin duration, elevated BUN/SCr ratio      Pertinent cultures to date:   19: wound - right gluteal mass: negative   19: blood - peripheral x 2: negative   19: wound - right buttock: negative   19: blood - peripheral x 2: negative   19: wound - retroperitoneal drain aspirate: negative   19:PBCx2:NGTD     MRSA nares swab if pneumonia is a concern (ordered/positive/negative/n-a): n/a    Recent Labs      19   0202  19   0233  19   0222   WBC  3.3*  3.9*  4.3*   NEUTSPOLYS  72.90*  59.20  54.50   BANDSSTABS  3.40   --    --      Recent Labs      19   0202   BUN  19   CREATININE  0.68   ALBUMIN  2.9*     Recent Labs      19   2141  19   1859   VANCOTROUGH  42.2*  18.5   No intake or output data in the 24 hours ending 19 0753   BP (!) 165/60   Pulse 100   Temp 36.3 °C (97.4 °F) (Temporal)   Resp 18   Ht 1.524 m (5')   Wt 64.6 kg (142 lb 6.7 oz)   SpO2 96%  Temp (24hrs), Av.8 °C (98.3 °F), Min:36.1 °C (97 °F), Max:37.7 °C (99.8 °F)    A/P        1. Vancomycin dose change: none  2. Next vancomycin level: 1-2 days   3. Goal trough: 16-20 mcg/mL  4. Comments: Last level @ goal , renal labs appear stable  (04/19/19). Tmax 37.7. Cx unchanged. ID following , diflucan added yesterday possible re drainage hardware removal planned.     Willy CandelariaD BCPS

## 2019-04-21 NOTE — CARE PLAN
Problem: Safety  Goal: Will remain free from injury  Hourly rounding in effect, pt instructed to call for assistance, bed locked and in lowest position. Bed alarm on.       Problem: Knowledge Deficit  Goal: Knowledge of disease process/condition, treatment plan, diagnostic tests, and medications will improve  Pt updated and educated on nursing interventions, medications and POC

## 2019-04-21 NOTE — PROGRESS NOTES
Received report from Joaquim, at pt bedside. Pt A/O x 3-4, pt waiting for SNF acceptance, pt having low intake with supervised feeding possible need for NG tube if no improvement, POC discussed. Call light and belongings within reach. Bed locked and in low position. Alarm on and fall precautions in place.

## 2019-04-21 NOTE — PROGRESS NOTES
"[facility] Medicine Daily Progress Note    Date of Service  4/21/2019    Chief Complaint  68 y.o. female admitted 3/8/2019 with right hip pain, fever and abnormal brain mri.    Hospital Course    Patient started on empiric antibiotics given fever.  She had abnormal findings on MRI brain and CT showed \"lesion\" on right iliopsoas.  This lesion was biopsied and turned out to be abscess.  She has history of breast cancer and there is also concern of brain lesions being metastatic though their appearance is not typical of this.      Interval Problem Update  3/12 Discussed with Dr Turner for second opinion of brain lesions and based on appearance not able to r/o or r/i any diagnosis.  Given patient's presentation of confusion, hip pain and fever  - this is more supportive of infectious etiology rather than malignant.  Fluid from right gluteal area sent to micro and as of yet - no growth.  Continue zosyn for now.  3/13 Patient with slight improvement in mentation.  Blood cultures and abscess cultures are showing no growth at this time.  TTE being done while I examined patient - no evidence of vegetations on this test.  ID consultation pending - d/w Dr Garcia.  3/14 Patient feeling well today, her pain is present but not as bad as prior to admission.  She was able to follow the conversation today and is agreeable to move forward with the CHAS tomorrow.  I spoke with her brother, René, and updated him on condition yesterday afternoon also.  Will also have PICC placed in next few days as long as blood cultures remain negative as I expect a prolonged antibiotic course of treatment.  3/15 Patient without new complaints, states she needs help to move in bed.  CHAS showed no evidence of vegetations and regular diet resumed.  Culture remains negative and biopsy of brain lesion may prove needed - will watch through the weekend and if mentation does not continue to improve, will discuss with neurosurgery on Monday.  3/16 Patient states " she is okay today, mentation has waxed and waned without significant improvement.  She was febrile earlier today.  No other acute events.  3/17 Patient with significant improvement in mentation today, she is aware of her hospitalization, not falling asleep mid sentence.  Her pain mediations were held most of yesterday and likely far too strong for patient and were affecting her awareness.  Oxycodone 5 discontinued and will use tramadol instead unless pain not well controlled.  CT brain with contrast ordered as a quick scan for comparison to MRI.    3/18 Patient feeling same today, discussed need to discuss with neurosurgery for possible biopsy.  Discussed with Dr Nguyen, he reviewed MRI and CT brain and he feels they are likely metastatic lesions but are far too small to biopsy.  His recommendation is to continue with antibiotics and re-image in 2 weeks to see if any change that would be amenable to biopsy or that would further declare infection.  3/26 Patient mental status continues to wax and wane.  ID ordered MRI lumbar, pelvis and sacrum for evaluation for source of infection, patient with continued low back pain and no real improvement since I saw her 7 days prior with continued antibiotics during this time.  Cultures have not growth pathogen, repeat MRI 3/28.   3/27 Patient somnolent most of the day today, MRI's completed and showing 2 areas of fluid collections, given her history are likely abscesses and will require drainage, CT guided drainage requested and patient NPO at [facility] for this.  D/w ID - cultures will be done on fluid collection.  Repeat CT brain ordered for tomorrow.  3/28 Patient up to chair, diet resumed as lovenox given this am and drainage of abscesses deferred until tomorrow.  Vanco/merrem to continue and cultures will be collected from abscesses.  3/29 Patient went to IR today, had drain placed in the right buttock into abscess cavity and fluid sent for culture.  Potassium is slightly low,  continue with PO replacement.  HR has improved from yesterday but patient PO intake still not at normal level, increase IVF rate to 100cc/hour.  3/30 Patient without fever since drain placed right buttock.  Purulent material in ÁNGEL bulb. Cultures thus far are still showing no growth.  Antibiotics to continue.  CT head ordered to evaluate change in past 2 weeks as recommended by neurosurgery.  3/31 Patient with fevers overnight - was altered during this time but has recovered.  She denies pain when examined by me.  She still has purulent material in the drainage tubing.  Will continue with current IV antibiotics - 6 weeks total antibiotics suspected - end date would be 4/24/19.  D/w ID.  4/1 Patient remains intermittently somnolent.  She wakes easily but falls back to sleep quickly.  She remained afebrile overnight.  ÁNGEL drain continues to drain purulent material.  ABX through 4/24 - once accepting facility found, patient okay to transfer with midline intact for completion of antibiotics.    4/9 Patient somnolent when examined.  CT showed resolution of fluid collection where ÁNGEL in place.  RN to remove ÁNGEL today.  All cultures still remain without growth.  4/10 Patient seen when working with OT, she is awake but gets off task quickly and is easily fatigued.  ÁNGEL drain removed yesterday.  Patient states she has pain in the lumbar spine when she coughs - just above her surgical area.  She has been in bed for nearly 1 month and this is likely not helping her back pain - educated need for OOB as often as tolerated.  4/11 Patient had bowel incontinence prior to my entering room, states she thinks she needs a bed pan.  CNA notified.  She continues to have low back pain in the area of her surgery and given her bed-bound status during her infection and intermittent confusion, this is not unexpected.  Continuing with therapy recommended and patient is 1:1 feeding given her weakness to feed herself.  4/12 Patient tearful this am,  she is concerned that she will not walk again, support given and educated that she is weak and the inability to walk is only temporary.  She needs to work with therapy and get stronger and I need to stop her narcotics and only give tyelnol/ibuprofen for pain management along with non-narcotic alternatives.  I spoke to patient's brother, René, at her request.  4/13 Patient up to chair when evaluated, mentation improved with discontinuation of narcotics. Brother is coming to visit this weekend and she is looking forward to this.  Ortho recommendations still pending - per ID she spoke to Dr Porter who is consulting Dr Sandra for recommendations.  4/14 Patient up to chair with assistance today.  CT brain remains same as prior 2 weeks ago.  Still pending ortho consultation.  4/15 Patient to be seen by ortho today per Dr Porter.  D/w dietary - patient with weight loss and poor po intake, she needs to be a 1:1 feed patient and if she fails this, will likely need tube feeding to get adequate nutrition.  4/16: Lethargic.  Opens eyes to physical stimulus but drifts quickly back to sleep.  Has been tachycardic with soft blood pressure in the morning.  Lasix held.  Palliative consulted.  4/17: Awake and alert this morning.  Interactive.  Follows simple commands and answer simple questions.  Disoriented to time.  No distress noted.  No issues overnight.  4/18: Awake this morning and interactive.  Slow to respond.  Still disoriented to time.  Can answer simple questions and follow simple commands.  Pelvic CT scan ordered to follow-up on pelvic abscess.  4/19: Repeated CT scan of the abdominal pelvis showed recurrence of the right gluteal and iliac muscle abscess.  Discussed with orthopedic surgery and ID who recommended IR CT-guided aspiration and possible drainage placement.  I tried to contact the brother to update him but the phone number was incorrect.  Patient was sitting up in chair comfortably.  She was anxious  after was informed about her CT results.  She is asking when can she be discharged.  No distress noted however.  No issues overnight per  4/20: Sitting up in chair comfortably.  Per IR, abscesses were too small to be drained.  Afebrile overnight.  Tolerating p.o. fairly.  No issues overnight.  4/21: Resting comfortably in bed.  No acute distress noted.  Pain fairly controlled.  No issues overnight per staff.  Consultants/Specialty  ID   Ortho -   Palliative  Code Status  full    Disposition  SNF/LTAC in [place]    Review of Systems  Review of Systems   Constitutional: Positive for malaise/fatigue. Negative for chills and fever.   HENT: Negative for ear discharge, ear pain, hearing loss and tinnitus.    Eyes: Negative for blurred vision, double vision, photophobia and pain.   Respiratory: Negative for cough, hemoptysis and sputum production.    Cardiovascular: Negative for chest pain, palpitations, orthopnea and claudication.   Gastrointestinal: Positive for abdominal pain. Negative for diarrhea, heartburn, nausea and vomiting.   Genitourinary: Negative for dysuria, frequency and urgency.   Musculoskeletal: Negative for back pain (lumbar/sacral area), myalgias and neck pain.   Skin: Negative for rash.   Neurological: Positive for weakness. Negative for dizziness, tingling and headaches.   Endo/Heme/Allergies: Does not bruise/bleed easily.   Psychiatric/Behavioral: Negative for depression, substance abuse and suicidal ideas.        Physical Exam  Temp:  [36.1 °C (97 °F)-37.1 °C (98.7 °F)] 37.1 °C (98.7 °F)  Pulse:  [] 106  Resp:  [17-24] 24  BP: (135-180)/(60-95) 180/88  SpO2:  [92 %-99 %] 92 %    Physical Exam   Constitutional: No distress.   HENT:   Head: Normocephalic and atraumatic.   Mouth/Throat: No oropharyngeal exudate.   Eyes: Pupils are equal, round, and reactive to light. Conjunctivae are normal. No scleral icterus.   Neck: Neck supple. No tracheal deviation present. No thyromegaly present.    Cardiovascular: Normal rate, regular rhythm and normal heart sounds.  Exam reveals no gallop and no friction rub.    Pulmonary/Chest: Effort normal and breath sounds normal. No respiratory distress. She has no wheezes.   Abdominal: Soft. Bowel sounds are normal. She exhibits no distension. There is no tenderness. There is no rebound.   Musculoskeletal: She exhibits no tenderness or deformity.          Neurological: She is alert. She is disoriented. No cranial nerve deficit.        Skin: Skin is warm. She is not diaphoretic. No erythema. There is pallor.   Psychiatric: She has a normal mood and affect. She is slowed.   Nursing note and vitals reviewed.      Fluids  No intake or output data in the 24 hours ending 04/21/19 1458    Laboratory  Recent Labs      04/19/19   0202  04/20/19   0233  04/21/19   0222   WBC  3.3*  3.9*  4.3*   RBC  5.22  4.57  4.46   HEMOGLOBIN  12.6  11.2*  11.2*   HEMATOCRIT  42.4  36.7*  36.0*   MCV  81.2*  80.3*  80.7*   MCH  24.1*  24.5*  25.1*   MCHC  29.7*  30.5*  31.1*   RDW  50.9*  50.8*  52.4*   PLATELETCT  100*  123*  129*   MPV   --   11.1  10.5     Recent Labs      04/19/19   0202  04/21/19   0222   SODIUM  133*  136   POTASSIUM  4.9  4.6   CHLORIDE  104  109   CO2  22  23   GLUCOSE  78  102*   BUN  19  24*   CREATININE  0.68  0.72   CALCIUM  10.6*  11.2*     Recent Labs      04/20/19   0756   INR  1.06               Imaging  CT-ABDOMEN-PELVIS WITH   Final Result         1.  Enhancing fluid collection in the right gluteal muscle, appearance compatible with small abscess.   2.  Loculated enhancing fluid collection in the right iliac is muscle, corresponding with site of prior drain, appearance compatible with reaccumulation of abscess.   3.  Fluid-filled distention of small bowel suggests ileus   4.  Atherosclerosis   5.  Trace pericardial effusion      CT-CTA CHEST PULMONARY ARTERY W/ RECONS   Final Result         1.  No large central pulmonary embolus is appreciated, evaluation  of the subsegmental branches is essentially nondiagnostic due to motion artifacts. Additional imaging would be required for definitive exclusion of small distal pulmonary emboli.   2.  Trace pericardial effusion   3.  Hepatomegaly   4.  Atherosclerosis.   5.  Right pulmonary nodules, the largest is a 7.7 mm pulmonary nodule, see nodule follow-up recommendations below.      Low Risk: CT at 3-6 months, then consider CT at 18-24 months      High Risk: CT at 3-6 months, then at 18-24 months      Comments: Use most suspicious nodule as guide to management. Follow-up intervals may vary according to size and risk.      Low Risk - Minimal or absent history of smoking and of other known risk factors.      High Risk - History of smoking or of other known risk factors.      Note: These recommendations do not apply to lung cancer screening, patients with immunosuppression, or patients with known primary cancer.      Fleischner Society 2017 Guidelines for Management of Incidentally Detected Pulmonary Nodules in Adults         DX-CHEST-PORTABLE (1 VIEW)   Final Result         1.  Mild pulmonary edema and/or infiltrate   2.  Cardiomegaly      CT-HEAD WITH & W/O   Final Result      1.  No change in scattered hyperenhancing lesions throughout the brain, cerebellum and visualized brainstem. Some of them demonstrate mild associated edema, similar to prior study.   2.  No CT evidence of infarct or hemorrhage.      CT-NEEDLE BX-MUSCLE RIGHT   Final Result   Addendum 1 of 1   Addendum:   The biopsy/drainage was done utilizing low dose optimization CT techniques    including auto modulation for  imaging and low mA CT/fluoroscopy mode    for the procedure.      Final      CT-guided aspiration of pus like material in the right gluteal muscle lesion.      CT-ABDOMEN-PELVIS WITH   Final Result      1.  Resolution of right iliacus abscess with no associated fluid adjacent to the drainage catheter      2.  Interval removal of right gluteal  drainage catheter      3.  Surgical screw traversing both sacroiliac joint with associated pathologic fracture of the right ilium and right medial sacrum      4.  Hepatomegaly      CT-DRAIN-HEMATOMA - SEROMA   Final Result      1.  CT-GUIDED RETROPERITONEAL (EXTRAPERITONEAL) RIGHT PELVIC ABSCESS DRAINAGE AT THE RIGHT ILIOPSOAS. ORDERS WERE WRITTEN FOR ONCE DAILY IRRIGATION OF THE CATHETER WITH 5 ML STERILE SALINE.      2.  THE CURRENT PLAN IS TO MONITOR DRAINAGE OUTPUT AND OBTAIN A FOLLOWUP CT SCAN IN 5-7 DAYS IF CLINICALLY INDICATED.      CT-ABDOMEN-PELVIS WITH   Final Result      1.  Rim-enhancing fluid collection in the right iliopsoas muscle may have increased in size slightly from the prior exam.      2.  Pigtail catheter in the right gluteus medius muscle. No residual fluid collection is appreciated.      3.  Pathologic fracture of the right ilium. Status post right SI joint fusion.      4.  Atherosclerosis.      5.  Cholelithiasis.      CT-HEAD WITH & W/O   Final Result      1.  Interval decrease in size and level of enhancement of multiple small punctate foci in both cerebral hemispheres and in the midbrain consistent with improving septic emboli.      2.  No hemorrhage or mass effect.      CT-DRAIN-RETROPERITONEAL   Final Result      1.  CT GUIDED CATHETER DRAINAGE OF RIGHT GLUTEAL ABSCESS.   2.  THE CURRENT PLAN IS TO OBTAIN A FOLLOW-UP CT SCAN IN 5-7 DAYS IF INDICATED. ORDERS WERE WRITTEN FOR ONCE DAILY IRRIGATION OF THE CATHETER WITH 5 ML STERILE SALINE FOR 3 DAYS.   3. THE ILIOPSOAS COLLECTION WAS NOT AMENABLE TO CATHETER DRAINAGE AS INTERVENING BOWEL AND/OR BONY STRUCTURES OBSTRUCT A PATHWAY TO THIS COLLECTION AT THIS TIME.      MR-LUMBAR SPINE-WITH & W/O   Final Result      1.  There are multifocal enhancing fluid collections in the right iliopsoas muscles and gluteus muscles adjacent to the right ilium. Right iliopsoas fluid collection measures an approximately 3.9 x 2.9 cm. The right gluteal fluid  collection measures an    approximately 3.5 x 2.8 cm in size. The differential diagnosis includes postsurgical seroma and abscess.   2.  Post operative and degenerative changes as described above.      MR-PELVIS-WITH & W/O AND SEQUENCES   Final Result      1.  Surgical screw traverses both sacroiliac joint across presumed pathologic fracture of the right sacrum and the hardware obscures the adjacent tissues.      2.  No other evidence for bony metastatic disease      3.  Right gluteal musculature rim-enhancing fluid collection measuring 3.4 x 2.8 cm. This could be a postoperative seroma versus abscess      4.  osteoarthritis of both hip joint      5.  Prior hysterectomy      IR-MIDLINE CATHETER INSERTION WO GUIDANCE > AGE 3   Final Result                  Ultrasound-guided midline placement performed by qualified nursing staff    as above.          CT-HEAD WITH   Final Result      Multiple subcentimeter too numerous to count enhancing masses scattered throughout the supratentorial and infratentorial brain. These demonstrate some surrounding vasogenic edema and most likely represent multifocal metastatic lesions. Other    considerations would include multifocal abscesses or septic emboli.      EC-CHAS W/O CONT   Final Result      EC-CHAS W/O CONT   Final Result      EC-ECHOCARDIOGRAM COMPLETE W/O CONT   Final Result      OUTSIDE IMAGES-CT CHEST/ABDOMEN/PELVIS   Final Result      OUTSIDE IMAGES-DX CHEST   Final Result      [id number] FILM X-RAY   Final Result      OUTSIDE IMAGES-MR BRAIN   Final Result      OUTSIDE IMAGES-MR BRAIN   Final Result           Assessment/Plan  * Lesion of brain   Assessment & Plan    Metastatic cancer vs infectious etiology  D/w Dr Black for questionable brain mets, recommending IR Bx of psoas muscle   IR Bx of psoas muscle ordered - was abscess  Empiric treatment of infection  TTE and CHAS negative for vegetations.  D/w Dr Nguyen - lesions likely metastatic based on his  interpretation of MRI/CT - too small to biopsy, recommends continuing antibiotics and re-imaging in 2 weeks to see if there has been any change.  CT brain to be repeated - lesions same as prior 2 weeks ago.  Will likely need follow-up CT scan of the brain to assess effectiveness of antibiotics.  Continue with vancomycin and Merrem.  Cultures has been negative so far.  Monitor kidney functions closely and monitor vancomycin trough with a goal between 15 and 20.  Palliative consulted.       Encephalopathy   Assessment & Plan    Waxes and wanes.  Likely secondary to multiple brain lesions.  Avoid benzodiazepines and/or anticholinergic medications.  Avoid sedatives or hypnotics.  At baseline for now.         Mass of iliopsoas muscle group   Assessment & Plan    Return of fluid collection, repeat drainage in IR with cultures, drain placed 3/29 - 10ml purulent material drained and sent for culture - no growth a/o 3/30  Was on linezolid and cefepime and now on vanco/merrem - end date to be 6 weeks of treatment. (4/24/19)  ID following.  Repeat CT abdomen shows resolution of fluid collection and ÁNGEL output dwindling - removed ÁNGEL drain  Cultures are negative so far.  CHAS negative for endocarditis.  Patient also has right hip ORIF.  Orthopedic surgery recommended hardware to stay for at least 6 months to promote adequate healing and to continue with antibiotics for now.  4/18: Repeat CT scan of the pelvic area ordered to follow-up on resolution and response to treatment.  4/19: Repeated CT abdomen and pelvis showed recurrence of the right gluteal and iliac muscle abscesses.  Discussed with ID and orthopedic surgery.  Recommended IR aspiration and drain placement with fluid analysis and culture.  4/20: Patient did not qualify for IR aspiration and drain placement for the above-mentioned abscesses as they were too small.         Pancytopenia (HCC)   Assessment & Plan    Could be due to toxic effects of infection on bone  marrow.  Afebrile.  ANC within normal limits.  Continue to monitor.  Gradually improving.       Hyponatremia- (present on admission)   Assessment & Plan    Mildly low - doubt contribution to mentation         RLS (restless legs syndrome)- (present on admission)   Assessment & Plan    Pramipexole       Diabetes mellitus type 2 in obese (HCC)- (present on admission)   Assessment & Plan    Well controlled   Short acting insulin as needed   Accu-Checks, hypoglycemia protocol          History of breast cancer- (present on admission)   Assessment & Plan    Status post mastectomy.  And none if brain lesions represent metastasis but less likely as repeat CT showed decrease in size with antibiotics wishes consistent with abscesses rather than metastasis.       COPD (chronic obstructive pulmonary disease) (HCC)- (present on admission)   Assessment & Plan    Oxygen as needed, Respiratory protocol, Bronchodilators, Incentive spirometry      Hypercalcemia- (present on admission)   Assessment & Plan    Mild   Continue with Sensipar.  Monitor and adjust Sensipar dose accordingly.       Sinus tachycardia- (present on admission)   Assessment & Plan    Likely due to combination of infection and volume loss.  Echocardiogram with normal ejection fraction and no significant valvular abnormalities.  Check TSH.  Continue to monitor.     Urinary tract infection   Assessment & Plan    Culture remain no growth.       Fungal infection of the groin   Assessment & Plan    on nystatin powder 3/24     History of deep vein thrombosis- (present on admission)   Assessment & Plan    History of deep vein thrombosis   Was on Rivaroxaban as outpatient.     Held for Bx Mar 11  Restart AC after completing abx treatment         Essential hypertension- (present on admission)   Assessment & Plan    Restarted on losartan and furosemide with hold parameters.     Fever   Assessment & Plan    PRN Tylenol.     GERD (gastroesophageal reflux disease)- (present on  admission)   Assessment & Plan    Omeprazole     Chronic pain- (present on admission)   Assessment & Plan     Multifactorial  Likely secondary to abscess, fracture, bed bound status  Pain control       Plan of care discussed with multidisciplinary team during rounds.    VTE prophylaxis: scds

## 2019-04-21 NOTE — PROGRESS NOTES
· 2 RN skin check complete with PATRICK Mcdowell.  · Skin assessed under pressure points.  · Confirmed pressure ulcers found on: none.  · New potential pressure ulcers noted on: none.  · The following interventions in place Waffle overlay and q2 turns in place.

## 2019-04-21 NOTE — PROGRESS NOTES
Pt A&Ox3, disoriented to time. VS: BP (!) 180/88   Pulse (!) 106   Temp 37.1 °C (98.7 °F) (Temporal)   Resp (!) 24   Ht 1.524 m (5')   Wt 64.6 kg (142 lb 6.7 oz)   SpO2 92%   Breastfeeding? No   BMI 27.81 kg/m² . Pt denies n/v, numbness, tingling, SOB and chest pain. Pt having pain, prn tylenol administered per MAR. Pt needs met at this time, call light within reach, hourly rounding in effect, and will continue to monitor.

## 2019-04-22 ENCOUNTER — APPOINTMENT (OUTPATIENT)
Dept: RADIOLOGY | Facility: MEDICAL CENTER | Age: 69
DRG: 981 | End: 2019-04-22
Attending: INTERNAL MEDICINE
Payer: MEDICARE

## 2019-04-22 LAB
ALBUMIN SERPL BCP-MCNC: 2.7 G/DL (ref 3.2–4.9)
ALBUMIN/GLOB SERPL: 0.7 G/DL
ALP SERPL-CCNC: 228 U/L (ref 30–99)
ALT SERPL-CCNC: 16 U/L (ref 2–50)
ANION GAP SERPL CALC-SCNC: 8 MMOL/L (ref 0–11.9)
AST SERPL-CCNC: 39 U/L (ref 12–45)
BASOPHILS # BLD AUTO: 0.6 % (ref 0–1.8)
BASOPHILS # BLD: 0.03 K/UL (ref 0–0.12)
BILIRUB SERPL-MCNC: 0.5 MG/DL (ref 0.1–1.5)
BUN SERPL-MCNC: 20 MG/DL (ref 8–22)
CALCIUM SERPL-MCNC: 11.6 MG/DL (ref 8.5–10.5)
CHLORIDE SERPL-SCNC: 108 MMOL/L (ref 96–112)
CO2 SERPL-SCNC: 23 MMOL/L (ref 20–33)
CREAT SERPL-MCNC: 0.63 MG/DL (ref 0.5–1.4)
EOSINOPHIL # BLD AUTO: 0.59 K/UL (ref 0–0.51)
EOSINOPHIL NFR BLD: 12.2 % (ref 0–6.9)
ERYTHROCYTE [DISTWIDTH] IN BLOOD BY AUTOMATED COUNT: 52.2 FL (ref 35.9–50)
GLOBULIN SER CALC-MCNC: 3.8 G/DL (ref 1.9–3.5)
GLUCOSE SERPL-MCNC: 90 MG/DL (ref 65–99)
HCT VFR BLD AUTO: 36.8 % (ref 37–47)
HGB BLD-MCNC: 11.6 G/DL (ref 12–16)
IMM GRANULOCYTES # BLD AUTO: 0.04 K/UL (ref 0–0.11)
IMM GRANULOCYTES NFR BLD AUTO: 0.8 % (ref 0–0.9)
LYMPHOCYTES # BLD AUTO: 0.78 K/UL (ref 1–4.8)
LYMPHOCYTES NFR BLD: 16.2 % (ref 22–41)
MCH RBC QN AUTO: 25.2 PG (ref 27–33)
MCHC RBC AUTO-ENTMCNC: 31.5 G/DL (ref 33.6–35)
MCV RBC AUTO: 79.8 FL (ref 81.4–97.8)
MONOCYTES # BLD AUTO: 0.85 K/UL (ref 0–0.85)
MONOCYTES NFR BLD AUTO: 17.6 % (ref 0–13.4)
NEUTROPHILS # BLD AUTO: 2.53 K/UL (ref 2–7.15)
NEUTROPHILS NFR BLD: 52.6 % (ref 44–72)
NRBC # BLD AUTO: 0 K/UL
NRBC BLD-RTO: 0 /100 WBC
PLATELET # BLD AUTO: 144 K/UL (ref 164–446)
PMV BLD AUTO: 10.7 FL (ref 9–12.9)
POTASSIUM SERPL-SCNC: 3.8 MMOL/L (ref 3.6–5.5)
PROT SERPL-MCNC: 6.5 G/DL (ref 6–8.2)
RBC # BLD AUTO: 4.61 M/UL (ref 4.2–5.4)
SODIUM SERPL-SCNC: 139 MMOL/L (ref 135–145)
WBC # BLD AUTO: 4.8 K/UL (ref 4.8–10.8)

## 2019-04-22 PROCEDURE — 700102 HCHG RX REV CODE 250 W/ 637 OVERRIDE(OP): Performed by: INTERNAL MEDICINE

## 2019-04-22 PROCEDURE — 700111 HCHG RX REV CODE 636 W/ 250 OVERRIDE (IP): Performed by: INTERNAL MEDICINE

## 2019-04-22 PROCEDURE — 71045 X-RAY EXAM CHEST 1 VIEW: CPT

## 2019-04-22 PROCEDURE — 700105 HCHG RX REV CODE 258: Performed by: INTERNAL MEDICINE

## 2019-04-22 PROCEDURE — 97530 THERAPEUTIC ACTIVITIES: CPT

## 2019-04-22 PROCEDURE — 36415 COLL VENOUS BLD VENIPUNCTURE: CPT

## 2019-04-22 PROCEDURE — A9270 NON-COVERED ITEM OR SERVICE: HCPCS | Performed by: FAMILY MEDICINE

## 2019-04-22 PROCEDURE — A9270 NON-COVERED ITEM OR SERVICE: HCPCS | Performed by: INTERNAL MEDICINE

## 2019-04-22 PROCEDURE — 99232 SBSQ HOSP IP/OBS MODERATE 35: CPT | Performed by: INTERNAL MEDICINE

## 2019-04-22 PROCEDURE — 80053 COMPREHEN METABOLIC PANEL: CPT

## 2019-04-22 PROCEDURE — 770009 HCHG ROOM/CARE - ONCOLOGY SEMI PRI*

## 2019-04-22 PROCEDURE — 85025 COMPLETE CBC W/AUTO DIFF WBC: CPT

## 2019-04-22 PROCEDURE — 700111 HCHG RX REV CODE 636 W/ 250 OVERRIDE (IP): Performed by: FAMILY MEDICINE

## 2019-04-22 PROCEDURE — 97116 GAIT TRAINING THERAPY: CPT

## 2019-04-22 PROCEDURE — 700102 HCHG RX REV CODE 250 W/ 637 OVERRIDE(OP): Performed by: FAMILY MEDICINE

## 2019-04-22 RX ADMIN — PRAMIPEXOLE DIHYDROCHLORIDE 0.25 MG: 0.25 TABLET ORAL at 17:53

## 2019-04-22 RX ADMIN — OMEPRAZOLE 20 MG: 20 CAPSULE, DELAYED RELEASE ORAL at 05:42

## 2019-04-22 RX ADMIN — VANCOMYCIN HYDROCHLORIDE 600 MG: 100 INJECTION, POWDER, LYOPHILIZED, FOR SOLUTION INTRAVENOUS at 20:43

## 2019-04-22 RX ADMIN — FLUCONAZOLE 200 MG: 100 TABLET ORAL at 05:43

## 2019-04-22 RX ADMIN — GABAPENTIN 300 MG: 300 CAPSULE ORAL at 17:20

## 2019-04-22 RX ADMIN — MEROPENEM 2 G: 1 INJECTION, POWDER, FOR SOLUTION INTRAVENOUS at 17:53

## 2019-04-22 RX ADMIN — LABETALOL HCL 100 MG: 100 TABLET, FILM COATED ORAL at 00:34

## 2019-04-22 RX ADMIN — ACETAMINOPHEN 650 MG: 325 TABLET, FILM COATED ORAL at 15:03

## 2019-04-22 RX ADMIN — MEROPENEM 2 G: 1 INJECTION, POWDER, FOR SOLUTION INTRAVENOUS at 08:35

## 2019-04-22 RX ADMIN — ACETAMINOPHEN 650 MG: 325 TABLET, FILM COATED ORAL at 20:43

## 2019-04-22 RX ADMIN — GABAPENTIN 300 MG: 300 CAPSULE ORAL at 05:43

## 2019-04-22 RX ADMIN — LOSARTAN POTASSIUM 50 MG: 50 TABLET ORAL at 05:43

## 2019-04-22 RX ADMIN — ACETAMINOPHEN 650 MG: 325 TABLET, FILM COATED ORAL at 05:42

## 2019-04-22 RX ADMIN — AMLODIPINE BESYLATE 10 MG: 10 TABLET ORAL at 05:42

## 2019-04-22 RX ADMIN — CINACALCET HYDROCHLORIDE 60 MG: 30 TABLET, FILM COATED ORAL at 05:43

## 2019-04-22 RX ADMIN — LABETALOL HCL 100 MG: 100 TABLET, FILM COATED ORAL at 17:20

## 2019-04-22 RX ADMIN — HYDRALAZINE HYDROCHLORIDE 20 MG: 20 INJECTION INTRAMUSCULAR; INTRAVENOUS at 01:38

## 2019-04-22 RX ADMIN — QUETIAPINE FUMARATE 25 MG: 25 TABLET ORAL at 17:20

## 2019-04-22 ASSESSMENT — ENCOUNTER SYMPTOMS
SENSORY CHANGE: 0
BRUISES/BLEEDS EASILY: 0
DOUBLE VISION: 0
SPEECH CHANGE: 0
BLURRED VISION: 0
BACK PAIN: 0
SHORTNESS OF BREATH: 0
DIAPHORESIS: 0
HALLUCINATIONS: 0
WEAKNESS: 1
MYALGIAS: 0
HEADACHES: 0
COUGH: 0
NAUSEA: 0
FEVER: 0
DIZZINESS: 0
SPUTUM PRODUCTION: 0
ORTHOPNEA: 0
DIARRHEA: 0
PALPITATIONS: 0
EYE PAIN: 0
ABDOMINAL PAIN: 1
PHOTOPHOBIA: 0
CHILLS: 0
FOCAL WEAKNESS: 0
VOMITING: 0
NECK PAIN: 0
HEMOPTYSIS: 0

## 2019-04-22 ASSESSMENT — COGNITIVE AND FUNCTIONAL STATUS - GENERAL
WALKING IN HOSPITAL ROOM: A LOT
MOVING TO AND FROM BED TO CHAIR: UNABLE
CLIMB 3 TO 5 STEPS WITH RAILING: TOTAL
MOVING FROM LYING ON BACK TO SITTING ON SIDE OF FLAT BED: UNABLE
SUGGESTED CMS G CODE MODIFIER MOBILITY: CL
STANDING UP FROM CHAIR USING ARMS: A LOT
TURNING FROM BACK TO SIDE WHILE IN FLAT BAD: A LITTLE
MOBILITY SCORE: 10

## 2019-04-22 ASSESSMENT — GAIT ASSESSMENTS
GAIT LEVEL OF ASSIST: MODERATE ASSIST
DISTANCE (FEET): 8
ASSISTIVE DEVICE: FRONT WHEEL WALKER

## 2019-04-22 NOTE — PROGRESS NOTES
INTERVENTIONAL RADIOLOGY PROCEDURE CONSULT - SHORT NOTE    Procedure Requested: CT GUIDED DRAINAGE RIGHT PELVIC (ILIACUS) AND RIGHT GLUTEAL ABSCESSES    Date of request: 4/20/2019    Date of consultation: 4/21/2019    Requesting Provider: RANJEET    Summary:  RIGHT GLUTEAL AND RIGHT ILIACUS - PELVIC CHRONIC ABSCESSES. PREVIOUS IR CATHETER DRAINAGE OF BOTH OF THESE COLLECTIONS  3/29/19 AND 4/3/19 RESPECTIVELY.     Imaging Findings:  CURRENT CT 4/19/19 SHOWS BOTH OF THESE COLLECTIONS TOO SMALL FOR CATHETER ACCESS/DRAINAGE AT THIS TIME.     Interventional Radiology Recommendation:  CONTINUE ABX AS INDICATED BASED ON PRIOR CULTURE RESULTS. IF FOLLOWUP CT SHOWS ENLARGING COLLECTIONS RATHER THAN RESOLVING COLLECTIONS, CATHETER DRAINAGE CAN BE REVISITED.         4/21/2019 8:06 PM Primo Persaud

## 2019-04-22 NOTE — DIETARY
Brief Nutrition Update:  Patient on day 45 of admit. Spoke with patient.  She stated she is trying to drink two Boost and two high protein milkshakes per day.  We reviewed alternative meal choices and patient made some new selections.    Patient 0-100% with great variability.  Weight (4/20) 64.6 up slightly from 4/17 weight of 63.4 kg.   Patient is receiving Boost supplements and milkshakes both  TID.   RD following for adequacy of PO intake. Nutrition Representative will continue to see her for ongoing meal and snack preferences.

## 2019-04-22 NOTE — CARE PLAN
Problem: Bowel/Gastric:  Goal: Normal bowel function is maintained or improved  Outcome: PROGRESSING SLOWER THAN EXPECTED  Pt had loose BM. Light brown, medium. Incontinent.     Problem: Skin Integrity  Goal: Risk for impaired skin integrity will decrease  Outcome: PROGRESSING AS EXPECTED  Pt is incontinent. Barrier wipes, torrey cream, waffle cushion, q2h turns, 2 RN skin check q shift.

## 2019-04-22 NOTE — PROGRESS NOTES
Infectious Disease Progress Note    Author: Ashely Hinojosa M.D. Date & Time of service: 4/22/2019  8:39 AM    Chief Complaint:  Multiple brain lesions  Iliopsoas lesion    Interval History:  68-year-old female with history of diabetes and breast cancer, admitted 3/8/2019 with abdominal pain, iliopsoas lesion, and an abnormal MRI with multiple brain lesions.  4/13/2019 patient is somewhat confused.  No fevers.  No new issues overnight.  4/14/2019 no fevers.  Somewhat confused.  WBC is 4.  4/15/2019 no fevers.  Complains of back pain and joint pain.  WBCs 5.5  4/16/2019 patient was transferred to telemetry overnight.  Much more lethargic.  4/17/2019-no fevers.  Wants to know the plan.  More awake and responsive.  4/18/2019 patient has no fevers.  She is drenching and sweats.  Complains of hip pain.  4/19/2019-low-grade fevers.  No new issues overnight.  Wants to walk.  Complains of pain.  4/20 afebrile WBC 3.9 patient is awake and answering questions appropriately.  She does endorse some pain on the right hip.  CT revealed enhancing fluid collection in the right gluteal muscle consistent with a small abscess and a loculated enhancing fluid collection in the right iliac muscle.  Plan for IR drainage today  4/21 afebrile WBC 4.3 patient transferred to oncology.  She is resting comfortably without any complaint.  Drain was not placed yesterday  4/22 afebrile WBC 4.8 patient is drowsy as she just woke up.  She complains of a cough when she drinks water.  Fluid collections too small for drainage by IR     labs Reviewed    Review of Systems:  Review of Systems   Constitutional: Positive for malaise/fatigue. Negative for diaphoresis and fever.   HENT: Negative for hearing loss.    Respiratory: Negative for cough and shortness of breath.    Cardiovascular: Negative for chest pain and leg swelling.   Gastrointestinal: Negative for nausea and vomiting.   Genitourinary: Negative for dysuria.   Musculoskeletal: Positive for  joint pain. Negative for back pain and myalgias.        Right hip   Neurological: Negative for sensory change and speech change.   All other systems reviewed and are negative.      Hemodynamics:  Temp (24hrs), Av.8 °C (98.3 °F), Min:36.6 °C (97.8 °F), Max:37.1 °C (98.7 °F)  Temperature: 36.6 °C (97.8 °F)  Pulse  Av.2  Min: 59  Max: 153  Blood Pressure : 146/60       Physical Exam:  Physical Exam   Constitutional: She appears well-developed and well-nourished. No distress.   Elderly   HENT:   Head: Normocephalic and atraumatic.   Eyes: Pupils are equal, round, and reactive to light. EOM are normal. Right eye exhibits no discharge. Left eye exhibits no discharge.   Neck: Neck supple.   Cardiovascular: Normal rate and intact distal pulses.    Murmur heard.  Pulmonary/Chest: Effort normal and breath sounds normal. No respiratory distress. She has no wheezes.   Abdominal: Soft. Bowel sounds are normal. She exhibits no distension. There is no tenderness.   Musculoskeletal: She exhibits no edema, tenderness or deformity.   Right upper extremity PICC line   Lymphadenopathy:     She has no cervical adenopathy.   Neurological:   Moves all extremities    Sleepy   Skin: Skin is warm. No rash noted.   Nursing note and vitals reviewed.    Meds:    Current Facility-Administered Medications:   •  hydrALAZINE  •  cinacalcet  •  amLODIPine  •  fluconazole  •  acetaminophen  •  meropenem  •  haloperidol lactate  •  QUEtiapine  •  gabapentin  •  labetalol  •  loperamide  •  losartan  •  MD Alert...Vancomycin per Pharmacy  •  Notify provider if pain remains uncontrolled **AND** Use the numeric rating scale (NRS-11) on regular floors and Critical-Care Pain Observation Tool (CPOT) on ICUs/Trauma to assess pain **AND** Pulse Ox (Oximetry) **AND** Pharmacy Consult Request **AND** If patient difficult to arouse and/or has respiratory depression, stop any opiates that are currently infusing and call a Rapid Response. **AND**  [DISCONTINUED] oxyCODONE immediate-release **AND** [DISCONTINUED] oxyCODONE immediate-release **AND** [DISCONTINUED] morphine injection  •  omeprazole  •  ondansetron  •  ondansetron  •  pramipexole  •  Respiratory Care per Protocol  •  vancomycin    Labs:  Recent Labs      04/20/19   0233  04/21/19 0222 04/22/19   0040   WBC  3.9*  4.3*  4.8   RBC  4.57  4.46  4.61   HEMOGLOBIN  11.2*  11.2*  11.6*   HEMATOCRIT  36.7*  36.0*  36.8*   MCV  80.3*  80.7*  79.8*   MCH  24.5*  25.1*  25.2*   RDW  50.8*  52.4*  52.2*   PLATELETCT  123*  129*  144*   MPV  11.1  10.5  10.7   NEUTSPOLYS  59.20  54.50  52.60   LYMPHOCYTES  15.70*  17.80*  16.20*   MONOCYTES  17.00*  16.90*  17.60*   EOSINOPHILS  6.30  9.40*  12.20*   BASOPHILS  1.00  0.90  0.60     Recent Labs      04/21/19 0222 04/22/19   0040   SODIUM  136  139   POTASSIUM  4.6  3.8   CHLORIDE  109  108   CO2  23  23   GLUCOSE  102*  90   BUN  24*  20     Recent Labs      04/21/19 0222 04/22/19   0040   ALBUMIN  2.7*  2.7*   TBILIRUBIN  0.5  0.5   ALKPHOSPHAT  231*  228*   TOTPROTEIN  6.7  6.5   ALTSGPT  14  16   ASTSGOT  40  39   CREATININE  0.72  0.63       Imaging:  CT abdomen and pelvis on 4/19/19  1.  Enhancing fluid collection in the right gluteal muscle, appearance compatible with small abscess.  2.  Loculated enhancing fluid collection in the right iliac is muscle, corresponding with site of prior drain, appearance compatible with reaccumulation of abscess.  3.  Fluid-filled distention of small bowel suggests ileus  4.  Atherosclerosis  5.  Trace pericardial effusion      Micro:  Results     Procedure Component Value Units Date/Time    BLOOD CULTURE [[id number]] Collected:  04/18/19 1536    Order Status:  Completed Specimen:  Blood from Peripheral Updated:  04/19/19 0813     Significant Indicator NEG     Source BLD     Site PERIPHERAL     Blood Culture No Growth    Note: Blood cultures are incubated for 5 days and  are monitored continuously.Positive blood  "cultures  are called to the RN and reported as soon as  they are identified.      Narrative:       Per Hospital Policy: Only change Specimen Src: to \"Line\" if  specified by physician order.    BLOOD CULTURE [[id number]] Collected:  04/18/19 1536    Order Status:  Completed Specimen:  Blood from Peripheral Updated:  04/19/19 0813     Significant Indicator NEG     Source BLD     Site PERIPHERAL     Blood Culture No Growth    Note: Blood cultures are incubated for 5 days and  are monitored continuously.Positive blood cultures  are called to the RN and reported as soon as  they are identified.      Narrative:       Per Hospital Policy: Only change Specimen Src: to \"Line\" if  specified by physician order.    URINALYSIS [[id number]]     Order Status:  No result Specimen:  Urine from Urine, Clean Catch           Assessment:  Active Hospital Problems    Diagnosis   • *Lesion of brain [G93.9]   • Mass of iliopsoas muscle group [M62.89]   • Encephalopathy [G93.40]   • History of breast cancer [Z85.3]   • Diabetes mellitus type 2 in obese (HCC) [E11.69, E66.9]       Plan:  Multiple brain lesions-likely abscesses versus metastatic lesions  Leukopenia  Confusion improving and intermittent  MRI with scattered small lesions incl aaron, too small to biopsy per neurosurgery  TTE neg; CHAS neg  Bcxs neg  Repeat CT scan on 3/30/2019 shows decrease in the size of the brain lesions, supporting that these are abscesses  Repeat MRI brain prior to completion of abx    Iliopsoas abscess, on treatment.  Persistent and worse.   CT + 2.6 cm lesion in the right iliopsoas region  S/p aspiration +WBCs-cultures negative to date  Bcxs neg  D/c cefepime 03/26 secondary to confusion  Tolerating Meropenem started 03/26  MRI w/ contrast lumbar, pelvis 03/27 - Iliopsoas and gluteal fluid collections noted.    Drain placed 3/29/2019.  Cultures negative  Another drain was placed on 4/4/2019 and it shows purulent fluid  Repeat CT from 4/8 with resolution of " the fluid collections after drainage  Repeat CT on 4/19 revealed right gluteal muscle abscess in addition to recurrent abscess in the right iliac muscle.  Fluid collections too small for drainage by IR  Continue meropenem  Duration of antibiotics clinical    Fevers, improved overall  Repeat blood cultures x2 are negative from 4/18/2019  UA shows some yeast  Repeat CT scan of the pelvis shows reaccumulation of the abscesses  Give short course of Diflucan.  Plan for 5-day course total.  Stop date 4/23/19    Right hip hardware  This is the likely source of infection.  Recommend hardware removal.   However per orthopedics this hardware needs to be in for 6 months.  Antibiotics per above    Type 2 diabetes mellitus  Hemoglobin A1c 6.3  Keep BS under 150 to help control current infection    Altered mental status, improved overall  Appears to be waxing and waning  Multifactorial, likely large contribution from her multiple brain abscesses, monitor  The CT scan done on 4/13/2019 shows persistence of the lesions    H/o breast cancer    I have performed a physical exam and reviewed and updated ROS and plan today 4/22/2019.  In review of yesterday's note 4/21/2019, there are no changes except as documented above.

## 2019-04-22 NOTE — PROGRESS NOTES
Pt is A&Ox2. Disoriented to place and situation. Fatigued. Mumbled speech. Pt is tachypneic and tachycardic. RA. Pain to back and buttock. Prn tylenol. Denies SOA and nausea. SHOBHA midline patent and infusing tko; IV abx. R PIV SL. incontinent of B/B. Barrier wipes, torrey cream, and waffle cushion in use. Fall precautions in place. Bed alarm on ; call light within reach; bed low and locked; hourly rounding. No new needs at this time. Will continue to monitor throughout shift.

## 2019-04-22 NOTE — PROGRESS NOTES
· 2 RN skin check complete with Ksenia NGUYEN.  · Skin assessed under pressure points.  · Bilateral elbows intact, red, blanchable  · Bilateral heels intact, boggy, red, blanchable  · Sacrum red, rash, intact.  · Perineal area red and intact  · Confirmed pressure ulcers found on: none.  · New potential pressure ulcers noted on: none.  · The following interventions in place waffle cushion, barrier wipes, torrey cream, and q2 turns in place.

## 2019-04-22 NOTE — PROGRESS NOTES
Pt A&Ox2, disoriented to time and situation. VS: /63   Pulse 89   Temp 36.3 °C (97.4 °F) (Temporal)   Resp 18   Ht 1.524 m (5')   Wt 64.6 kg (142 lb 6.7 oz)   SpO2 90%   Breastfeeding? No   BMI 27.81 kg/m² . Pt denies n/v, numbness, tingling, SOB and chest pain. Pt states pain in her hip, pt repositioned with positive results. Midline patent. PIV patent with positive blood return. Family at bedside. Pt needs met at this time, call light within reach, hourly rounding in effect, and will continue to monitor.

## 2019-04-22 NOTE — PROGRESS NOTES
Pharmacy Kinetics 68 y.o. female on vancomycin day # 29 2019    Currently on Vancomycin 600 mg iv q24hr ()     Indication for Treatment: possible brain abscesses/ iliopsoas abscess     Pertinent history per medical record: Admitted on 3/8/2019 for iliopsoas mass and multiple brain lesions. There is concern that this is infectious. All cultures are negative to date, but were all drawn while on antibiotics. CHAS was negative. Patient has a history of breast cancer and DM. ID is following. Plan for completion of abx (6 weeks) in house prior to transfer to [facility] in [place].      Other antibiotics: Meropenem 2g IV q8h, Diflucan 200mg po daily     Allergies: Patient has no known allergies.      List concerns for renal function: age, and prolonged vancomycin duration, elevated BUN/SCr ratio      Pertinent cultures to date:   19: wound - right gluteal mass: negative   19: blood - peripheral x 2: negative   19: wound - right buttock: negative   19: blood - peripheral x 2: negative   19: wound - retroperitoneal drain aspirate: negative   19:PBCx2:NGTD     MRSA nares swab if pneumonia is a concern (ordered/positive/negative/n-a): n/a    Recent Labs      19   0233  19   0222  19   0040   WBC  3.9*  4.3*  4.8   NEUTSPOLYS  59.20  54.50  52.60     Recent Labs      19   0222  19   0040   BUN  24*  20   CREATININE  0.72  0.63   ALBUMIN  2.7*  2.7*     Recent Labs      19   1859   [facility]  18.5     Intake/Output Summary (Last 24 hours) at 19 0836  Last data filed at 19 1701   Gross per 24 hour   Intake             99.6 ml   Output                0 ml   Net             99.6 ml      /60   Pulse 89   Temp 36.6 °C (97.8 °F) (Temporal)   Resp (!) 24   Ht 1.524 m (5')   Wt 64.6 kg (142 lb 6.7 oz)   SpO2 91%  Temp (24hrs), Av.8 °C (98.3 °F), Min:36.6 °C (97.8 °F), Max:37.1 °C (98.7 °F)      A/P   1. Vancomycin dose change:  none  2. Next vancomycin level: none- unless vancomycin continues beyond original stop date of 4/24/19.  3. Goal trough: 16-20mcg/mL  4. Comments: Renal function appears stable with adequate voids. Original plan per ID was to complete IV antibiotics 4/24/19. Will follow ID plan and adjust orders accordingly. Continue current regimen. Pharmacy will monitor/adjust as needed per protocol.     ZULEIKA Bojorquez Pharm.D.

## 2019-04-22 NOTE — PROGRESS NOTES
"[facility] Medicine Daily Progress Note    Date of Service  4/22/2019    Chief Complaint  68 y.o. female admitted 3/8/2019 with right hip pain, fever and abnormal brain mri.    Hospital Course    Patient started on empiric antibiotics given fever.  She had abnormal findings on MRI brain and CT showed \"lesion\" on right iliopsoas.  This lesion was biopsied and turned out to be abscess.  She has history of breast cancer and there is also concern of brain lesions being metastatic though their appearance is not typical of this.      Interval Problem Update  3/12 Discussed with Dr Turner for second opinion of brain lesions and based on appearance not able to r/o or r/i any diagnosis.  Given patient's presentation of confusion, hip pain and fever  - this is more supportive of infectious etiology rather than malignant.  Fluid from right gluteal area sent to micro and as of yet - no growth.  Continue zosyn for now.  3/13 Patient with slight improvement in mentation.  Blood cultures and abscess cultures are showing no growth at this time.  TTE being done while I examined patient - no evidence of vegetations on this test.  ID consultation pending - d/w Dr Garcia.  3/14 Patient feeling well today, her pain is present but not as bad as prior to admission.  She was able to follow the conversation today and is agreeable to move forward with the CHAS tomorrow.  I spoke with her brother, René, and updated him on condition yesterday afternoon also.  Will also have PICC placed in next few days as long as blood cultures remain negative as I expect a prolonged antibiotic course of treatment.  3/15 Patient without new complaints, states she needs help to move in bed.  CHAS showed no evidence of vegetations and regular diet resumed.  Culture remains negative and biopsy of brain lesion may prove needed - will watch through the weekend and if mentation does not continue to improve, will discuss with neurosurgery on Monday.  3/16 Patient states " she is okay today, mentation has waxed and waned without significant improvement.  She was febrile earlier today.  No other acute events.  3/17 Patient with significant improvement in mentation today, she is aware of her hospitalization, not falling asleep mid sentence.  Her pain mediations were held most of yesterday and likely far too strong for patient and were affecting her awareness.  Oxycodone 5 discontinued and will use tramadol instead unless pain not well controlled.  CT brain with contrast ordered as a quick scan for comparison to MRI.    3/18 Patient feeling same today, discussed need to discuss with neurosurgery for possible biopsy.  Discussed with Dr Nguyen, he reviewed MRI and CT brain and he feels they are likely metastatic lesions but are far too small to biopsy.  His recommendation is to continue with antibiotics and re-image in 2 weeks to see if any change that would be amenable to biopsy or that would further declare infection.  3/26 Patient mental status continues to wax and wane.  ID ordered MRI lumbar, pelvis and sacrum for evaluation for source of infection, patient with continued low back pain and no real improvement since I saw her 7 days prior with continued antibiotics during this time.  Cultures have not growth pathogen, repeat MRI 3/28.   3/27 Patient somnolent most of the day today, MRI's completed and showing 2 areas of fluid collections, given her history are likely abscesses and will require drainage, CT guided drainage requested and patient NPO at MN for this.  D/w ID - cultures will be done on fluid collection.  Repeat CT brain ordered for tomorrow.  3/28 Patient up to chair, diet resumed as lovenox given this am and drainage of abscesses deferred until tomorrow.  Vanco/merrem to continue and cultures will be collected from abscesses.  3/29 Patient went to IR today, had drain placed in the right buttock into abscess cavity and fluid sent for culture.  Potassium is slightly low,  continue with PO replacement.  HR has improved from yesterday but patient PO intake still not at normal level, increase IVF rate to 100cc/hour.  3/30 Patient without fever since drain placed right buttock.  Purulent material in ÁNGEL bulb. Cultures thus far are still showing no growth.  Antibiotics to continue.  CT head ordered to evaluate change in past 2 weeks as recommended by neurosurgery.  3/31 Patient with fevers overnight - was altered during this time but has recovered.  She denies pain when examined by me.  She still has purulent material in the drainage tubing.  Will continue with current IV antibiotics - 6 weeks total antibiotics suspected - end date would be 4/24/19.  D/w ID.  4/1 Patient remains intermittently somnolent.  She wakes easily but falls back to sleep quickly.  She remained afebrile overnight.  ÁNGEL drain continues to drain purulent material.  ABX through 4/24 - once accepting facility found, patient okay to transfer with midline intact for completion of antibiotics.    4/9 Patient somnolent when examined.  CT showed resolution of fluid collection where ÁNGEL in place.  RN to remove ÁNGEL today.  All cultures still remain without growth.  4/10 Patient seen when working with OT, she is awake but gets off task quickly and is easily fatigued.  ÁNGEL drain removed yesterday.  Patient states she has pain in the lumbar spine when she coughs - just above her surgical area.  She has been in bed for nearly 1 month and this is likely not helping her back pain - educated need for OOB as often as tolerated.  4/11 Patient had bowel incontinence prior to my entering room, states she thinks she needs a bed pan.  CNA notified.  She continues to have low back pain in the area of her surgery and given her bed-bound status during her infection and intermittent confusion, this is not unexpected.  Continuing with therapy recommended and patient is 1:1 feeding given her weakness to feed herself.  4/12 Patient tearful this am,  she is concerned that she will not walk again, support given and educated that she is weak and the inability to walk is only temporary.  She needs to work with therapy and get stronger and I need to stop her narcotics and only give tyelnol/ibuprofen for pain management along with non-narcotic alternatives.  I spoke to patient's brother, René, at her request.  4/13 Patient up to chair when evaluated, mentation improved with discontinuation of narcotics. Brother is coming to visit this weekend and she is looking forward to this.  Ortho recommendations still pending - per ID she spoke to Dr Porter who is consulting Dr Sandra for recommendations.  4/14 Patient up to chair with assistance today.  CT brain remains same as prior 2 weeks ago.  Still pending ortho consultation.  4/15 Patient to be seen by ortho today per Dr Porter.  D/w dietary - patient with weight loss and poor po intake, she needs to be a 1:1 feed patient and if she fails this, will likely need tube feeding to get adequate nutrition.  4/16: Lethargic.  Opens eyes to physical stimulus but drifts quickly back to sleep.  Has been tachycardic with soft blood pressure in the morning.  Lasix held.  Palliative consulted.  4/17: Awake and alert this morning.  Interactive.  Follows simple commands and answer simple questions.  Disoriented to time.  No distress noted.  No issues overnight.  4/18: Awake this morning and interactive.  Slow to respond.  Still disoriented to time.  Can answer simple questions and follow simple commands.  Pelvic CT scan ordered to follow-up on pelvic abscess.  4/19: Repeated CT scan of the abdominal pelvis showed recurrence of the right gluteal and iliac muscle abscess.  Discussed with orthopedic surgery and ID who recommended IR CT-guided aspiration and possible drainage placement.  I tried to contact the brother to update him but the phone number was incorrect.  Patient was sitting up in chair comfortably.  She was anxious  after was informed about her CT results.  She is asking when can she be discharged.  No distress noted however.  No issues overnight per  4/20: Sitting up in chair comfortably.  Per IR, abscesses were too small to be drained.  Afebrile overnight.  Tolerating p.o. fairly.  No issues overnight.  4/21: Resting comfortably in bed.  No acute distress noted.  Pain fairly controlled.  No issues overnight per staff.  4/22: He transferred to medical/oncology floor for further management.  He underwent CT-guided drainage of right pelvic and right gluteal abscess on April 21 thousand 19.  Continue antibiotic which include meropenem, vancomycin and antifungal fluconazole.  He found to have hypercalcemia of 11.6.  Hemoglobin remained stable.  Consultants/Specialty  Infectious disease  Palliative care  Code Status  DNAR/DNI    Disposition  SNF/LTAC in [place]    Review of Systems  Review of Systems   Constitutional: Positive for malaise/fatigue. Negative for chills and fever.   HENT: Negative for ear pain, hearing loss and tinnitus.    Eyes: Negative for blurred vision, double vision, photophobia and pain.   Respiratory: Negative for cough, hemoptysis and sputum production.    Cardiovascular: Negative for chest pain, palpitations and orthopnea.   Gastrointestinal: Positive for abdominal pain. Negative for diarrhea, nausea and vomiting.   Genitourinary: Negative for dysuria and urgency.   Musculoskeletal: Negative for myalgias and neck pain.   Skin: Negative for rash.   Neurological: Positive for weakness. Negative for dizziness, focal weakness and headaches.   Endo/Heme/Allergies: Does not bruise/bleed easily.   Psychiatric/Behavioral: Negative for hallucinations and suicidal ideas.        Physical Exam  Temp:  [36.3 °C (97.4 °F)-37.1 °C (98.7 °F)] 36.3 °C (97.4 °F)  Pulse:  [] 89  Resp:  [18-24] 18  BP: (124-180)/(60-88) 124/63  SpO2:  [90 %-100 %] 90 %    Physical Exam   Constitutional: No distress.   She is sitting in  chair without any acute distress.   HENT:   Head: Normocephalic and atraumatic.   Eyes: Pupils are equal, round, and reactive to light. Right eye exhibits no discharge. Left eye exhibits no discharge.   Neck: Normal range of motion. Neck supple.   Cardiovascular: Normal rate, regular rhythm and normal heart sounds.  Exam reveals no friction rub.    No murmur heard.  Pulmonary/Chest: Effort normal and breath sounds normal. No respiratory distress. She has no wheezes.   Abdominal: Soft. Bowel sounds are normal. She exhibits no distension. There is no tenderness.   Musculoskeletal: Normal range of motion. She exhibits no edema or tenderness.   Neurological: She is alert. No cranial nerve deficit.   Unable to complete evaluate for orientation as she has slurred speech which is difficult to understand.   Skin: Skin is warm and dry. She is not diaphoretic. No erythema.   Psychiatric: She has a normal mood and affect. Her behavior is normal.       Fluids    Intake/Output Summary (Last 24 hours) at 04/22/19 0900  Last data filed at 04/21/19 1701   Gross per 24 hour   Intake             99.6 ml   Output                0 ml   Net             99.6 ml       Laboratory  Recent Labs      04/20/19   0233  04/21/19   0222  04/22/19   0040   WBC  3.9*  4.3*  4.8   RBC  4.57  4.46  4.61   HEMOGLOBIN  11.2*  11.2*  11.6*   HEMATOCRIT  36.7*  36.0*  36.8*   MCV  80.3*  80.7*  79.8*   MCH  24.5*  25.1*  25.2*   MCHC  30.5*  31.1*  31.5*   RDW  50.8*  52.4*  52.2*   PLATELETCT  123*  129*  144*   MPV  11.1  10.5  10.7     Recent Labs      04/21/19   0222  04/22/19   0040   SODIUM  136  139   POTASSIUM  4.6  3.8   CHLORIDE  109  108   CO2  23  23   GLUCOSE  102*  90   BUN  24*  20   CREATININE  0.72  0.63   CALCIUM  11.2*  11.6*     Recent Labs      04/20/19   0756   INR  1.06               Imaging  CT-ABDOMEN-PELVIS WITH   Final Result         1.  Enhancing fluid collection in the right gluteal muscle, appearance compatible with small  abscess.   2.  Loculated enhancing fluid collection in the right iliac is muscle, corresponding with site of prior drain, appearance compatible with reaccumulation of abscess.   3.  Fluid-filled distention of small bowel suggests ileus   4.  Atherosclerosis   5.  Trace pericardial effusion      CT-CTA CHEST PULMONARY ARTERY W/ RECONS   Final Result         1.  No large central pulmonary embolus is appreciated, evaluation of the subsegmental branches is essentially nondiagnostic due to motion artifacts. Additional imaging would be required for definitive exclusion of small distal pulmonary emboli.   2.  Trace pericardial effusion   3.  Hepatomegaly   4.  Atherosclerosis.   5.  Right pulmonary nodules, the largest is a 7.7 mm pulmonary nodule, see nodule follow-up recommendations below.      Low Risk: CT at 3-6 months, then consider CT at 18-24 months      High Risk: CT at 3-6 months, then at 18-24 months      Comments: Use most suspicious nodule as guide to management. Follow-up intervals may vary according to size and risk.      Low Risk - Minimal or absent history of smoking and of other known risk factors.      High Risk - History of smoking or of other known risk factors.      Note: These recommendations do not apply to lung cancer screening, patients with immunosuppression, or patients with known primary cancer.      Fleischner Society 2017 Guidelines for Management of Incidentally Detected Pulmonary Nodules in Adults         DX-CHEST-PORTABLE (1 VIEW)   Final Result         1.  Mild pulmonary edema and/or infiltrate   2.  Cardiomegaly      CT-HEAD WITH & W/O   Final Result      1.  No change in scattered hyperenhancing lesions throughout the brain, cerebellum and visualized brainstem. Some of them demonstrate mild associated edema, similar to prior study.   2.  No CT evidence of infarct or hemorrhage.      CT-NEEDLE BX-MUSCLE RIGHT   Final Result   Addendum 1 of 1   Addendum:   The biopsy/drainage was done  utilizing low dose optimization CT techniques    including auto modulation for  imaging and low mA CT/fluoroscopy mode    for the procedure.      Final      CT-guided aspiration of pus like material in the right gluteal muscle lesion.      CT-ABDOMEN-PELVIS WITH   Final Result      1.  Resolution of right iliacus abscess with no associated fluid adjacent to the drainage catheter      2.  Interval removal of right gluteal drainage catheter      3.  Surgical screw traversing both sacroiliac joint with associated pathologic fracture of the right ilium and right medial sacrum      4.  Hepatomegaly      CT-DRAIN-HEMATOMA - SEROMA   Final Result      1.  CT-GUIDED RETROPERITONEAL (EXTRAPERITONEAL) RIGHT PELVIC ABSCESS DRAINAGE AT THE RIGHT ILIOPSOAS. ORDERS WERE WRITTEN FOR ONCE DAILY IRRIGATION OF THE CATHETER WITH 5 ML STERILE SALINE.      2.  THE CURRENT PLAN IS TO MONITOR DRAINAGE OUTPUT AND OBTAIN A FOLLOWUP CT SCAN IN 5-7 DAYS IF CLINICALLY INDICATED.      CT-ABDOMEN-PELVIS WITH   Final Result      1.  Rim-enhancing fluid collection in the right iliopsoas muscle may have increased in size slightly from the prior exam.      2.  Pigtail catheter in the right gluteus medius muscle. No residual fluid collection is appreciated.      3.  Pathologic fracture of the right ilium. Status post right SI joint fusion.      4.  Atherosclerosis.      5.  Cholelithiasis.      CT-HEAD WITH & W/O   Final Result      1.  Interval decrease in size and level of enhancement of multiple small punctate foci in both cerebral hemispheres and in the midbrain consistent with improving septic emboli.      2.  No hemorrhage or mass effect.      CT-DRAIN-RETROPERITONEAL   Final Result      1.  CT GUIDED CATHETER DRAINAGE OF RIGHT GLUTEAL ABSCESS.   2.  THE CURRENT PLAN IS TO OBTAIN A FOLLOW-UP CT SCAN IN 5-7 DAYS IF INDICATED. ORDERS WERE WRITTEN FOR ONCE DAILY IRRIGATION OF THE CATHETER WITH 5 ML STERILE SALINE FOR 3 DAYS.   3. THE  ILIOPSOAS COLLECTION WAS NOT AMENABLE TO CATHETER DRAINAGE AS INTERVENING BOWEL AND/OR BONY STRUCTURES OBSTRUCT A PATHWAY TO THIS COLLECTION AT THIS TIME.      MR-LUMBAR SPINE-WITH & W/O   Final Result      1.  There are multifocal enhancing fluid collections in the right iliopsoas muscles and gluteus muscles adjacent to the right ilium. Right iliopsoas fluid collection measures an approximately 3.9 x 2.9 cm. The right gluteal fluid collection measures an    approximately 3.5 x 2.8 cm in size. The differential diagnosis includes postsurgical seroma and abscess.   2.  Post operative and degenerative changes as described above.      MR-PELVIS-WITH & W/O AND SEQUENCES   Final Result      1.  Surgical screw traverses both sacroiliac joint across presumed pathologic fracture of the right sacrum and the hardware obscures the adjacent tissues.      2.  No other evidence for bony metastatic disease      3.  Right gluteal musculature rim-enhancing fluid collection measuring 3.4 x 2.8 cm. This could be a postoperative seroma versus abscess      4.  osteoarthritis of both hip joint      5.  Prior hysterectomy      IR-MIDLINE CATHETER INSERTION WO GUIDANCE > AGE 3   Final Result                  Ultrasound-guided midline placement performed by qualified nursing staff    as above.          CT-HEAD WITH   Final Result      Multiple subcentimeter too numerous to count enhancing masses scattered throughout the supratentorial and infratentorial brain. These demonstrate some surrounding vasogenic edema and most likely represent multifocal metastatic lesions. Other    considerations would include multifocal abscesses or septic emboli.      EC-CHAS W/O CONT   Final Result      EC-CHAS W/O CONT   Final Result      EC-ECHOCARDIOGRAM COMPLETE W/O CONT   Final Result      OUTSIDE IMAGES-CT CHEST/ABDOMEN/PELVIS   Final Result      OUTSIDE IMAGES-DX CHEST   Final Result      [id number] FILM X-RAY   Final Result      OUTSIDE IMAGES-MR  BRAIN   Final Result      OUTSIDE IMAGES-MR BRAIN   Final Result           Assessment/Plan  * Lesion of brain   Assessment & Plan    Metastatic cancer vs infectious etiology  D/w Dr Black for questionable brain mets, recommending IR Bx of psoas muscle   IR Bx of psoas muscle ordered - was abscess  Empiric treatment of infection  TTE and CHAS negative for vegetations.  D/w Dr Nguyen - lesions likely metastatic based on his interpretation of MRI/CT - too small to biopsy, recommends continuing antibiotics and re-imaging in 2 weeks to see if there has been any change.  CT brain to be repeated - lesions same as prior 2 weeks ago.  Will likely need follow-up CT scan of the brain to assess effectiveness of antibiotics.  Continue with vancomycin and Merrem.  Cultures has been negative so far.  Monitor kidney functions closely and monitor vancomycin trough with a goal between 15 and 20.  Palliative consulted.  Transferred to medical floor for further management.  He underwent CT-guided drainage of right gluteal abscess on April 21, 2019.  Continue antibiotic which include IV vancomycin and dose adjustment as per vancomycin trough.  Continue meropenem and antifungal.         Encephalopathy   Assessment & Plan    Waxes and wanes.  Likely secondary to multiple brain lesions.  Avoid benzodiazepines and/or anticholinergic medications.  Avoid sedatives or hypnotics.  At baseline for now.  Discussed plan of care with her.       Mass of iliopsoas muscle group   Assessment & Plan    Return of fluid collection, repeat drainage in IR with cultures, drain placed 3/29 - 10ml purulent material drained and sent for culture - no growth a/o 3/30  Was on linezolid and cefepime and now on vanco/merrem - end date to be 6 weeks of treatment. (4/24/19)  ID following.  Repeat CT abdomen shows resolution of fluid collection and ÁNGEL output dwindling - removed ÁNGEL drain  Cultures are negative so far.  CHAS negative for endocarditis.  Patient also has  right hip ORIF.  Orthopedic surgery recommended hardware to stay for at least 6 months to promote adequate healing and to continue with antibiotics for now.  4/18: Repeat CT scan of the pelvic area ordered to follow-up on resolution and response to treatment.  4/19: Repeated CT abdomen and pelvis showed recurrence of the right gluteal and iliac muscle abscesses.  Discussed with ID and orthopedic surgery.  Recommended IR aspiration and drain placement with fluid analysis and culture.  4/20: Patient did not qualify for IR aspiration and drain placement for the above-mentioned abscesses as they were too small.  4/22: He underwent CT-guided drainage of right pelvic and medical relapses on April 21, 2019.         Pancytopenia (HCC)   Assessment & Plan    Could be due to toxic effects of infection on bone marrow.  Afebrile.  ANC within normal limits.  Continue to monitor.  Gradually improving.  Resolved although normal white blood cell count and normal ANC.  Mild thrombocytopenia.       Hyponatremia- (present on admission)   Assessment & Plan    Mildly low - doubt contribution to mentation         RLS (restless legs syndrome)- (present on admission)   Assessment & Plan    Pramipexole       Diabetes mellitus type 2 in obese (HCC)- (present on admission)   Assessment & Plan    Well controlled   Insulin sliding scale with hypoglycemia protocol.  Continue to monitor       History of breast cancer- (present on admission)   Assessment & Plan    Status post mastectomy.  And none if brain lesions represent metastasis but less likely as repeat CT showed decrease in size with antibiotics wishes consistent with abscesses rather than metastasis.  Continue IV antibiotic.  Appreciate infectious disease recommendations.       COPD (chronic obstructive pulmonary disease) (HCC)- (present on admission)   Assessment & Plan    Currently maintaining saturation on room air.  Respiratory therapy per protocol.     Hypercalcemia- (present on  admission)   Assessment & Plan    Mild   Continue with Sensipar.  Monitor and adjust Sensipar dose accordingly.       Sinus tachycardia- (present on admission)   Assessment & Plan    Likely due to combination of infection and volume loss.  Echocardiogram with normal ejection fraction and no significant valvular abnormalities.  TSH is slightly elevated 7.15.  Ordered free T4  Tachycardia has been improving.     Urinary tract infection   Assessment & Plan    Culture remain no growth.       Fungal infection of the groin   Assessment & Plan    on nystatin powder 3/24  Continue fluconazole 200 mg daily.     History of deep vein thrombosis- (present on admission)   Assessment & Plan    History of deep vein thrombosis   Was on Rivaroxaban as outpatient.     Held for Bx Mar 11  Restart AC after completing abx treatment  Currently he is not on any anticoagulation.  He may need to reinitiation of anticoagulation.         Essential hypertension- (present on admission)   Assessment & Plan    Restarted on losartan and furosemide with hold parameters.  Continue to monitor     Fever   Assessment & Plan    PRN Tylenol.  Continue monitor     GERD (gastroesophageal reflux disease)- (present on admission)   Assessment & Plan    Omeprazole     Chronic pain- (present on admission)   Assessment & Plan     Multifactorial  Likely secondary to abscess, fracture, bed bound status  Pain control         I discussed plan of care during multidisciplinary rounds.    VTE prophylaxis: SCDs

## 2019-04-22 NOTE — CARE PLAN
Problem: Safety  Goal: Will remain free from injury  Hourly rounding in effect, pt instructed to call for assistance, bed locked and in lowest position. Bed alarm on. Yellow non-skid socks in use.        Problem: Knowledge Deficit  Goal: Knowledge of disease process/condition, treatment plan, diagnostic tests, and medications will improve  Pt updated and educated on nursing interventions, medications and POC

## 2019-04-22 NOTE — THERAPY
"Pt demonstrating improved cognition and balance. Pt continues to demonstrate posterior lean when standing, requiring facilitation. Pt required verbal cuing for sequencing ambulation w/wlaker and facliltaiton of weight shifting to advance. Pt w/quad and glut weakness as well as decreased ROM of B ankles. Pt would benefit from further acute PT txs to progress towards goals and independence. [facility]    Physical Therapy Treatment completed.   Bed Mobility:  Supine to Sit:  (NT--found EOB w/nursing)  Transfers: Sit to Stand: Moderate Assist  Gait: Level Of Assist: Moderate Assist with Front-Wheel Walker       Plan of Care: Will benefit from Physical Therapy 4 times per week    See \"Rehab Therapy-Acute\" Patient Summary Report for complete documentation.       "

## 2019-04-23 ENCOUNTER — APPOINTMENT (OUTPATIENT)
Dept: RADIOLOGY | Facility: MEDICAL CENTER | Age: 69
DRG: 981 | End: 2019-04-23
Attending: INTERNAL MEDICINE
Payer: MEDICARE

## 2019-04-23 LAB
ALBUMIN SERPL BCP-MCNC: 2.5 G/DL (ref 3.2–4.9)
ALBUMIN/GLOB SERPL: 0.7 G/DL
ALP SERPL-CCNC: 206 U/L (ref 30–99)
ALT SERPL-CCNC: 15 U/L (ref 2–50)
ANION GAP SERPL CALC-SCNC: 4 MMOL/L (ref 0–11.9)
AST SERPL-CCNC: 39 U/L (ref 12–45)
BACTERIA BLD CULT: NORMAL
BACTERIA BLD CULT: NORMAL
BASOPHILS # BLD AUTO: 1.7 % (ref 0–1.8)
BASOPHILS # BLD: 0.07 K/UL (ref 0–0.12)
BILIRUB SERPL-MCNC: 0.5 MG/DL (ref 0.1–1.5)
BUN SERPL-MCNC: 21 MG/DL (ref 8–22)
CALCIUM SERPL-MCNC: 11.2 MG/DL (ref 8.5–10.5)
CHLORIDE SERPL-SCNC: 107 MMOL/L (ref 96–112)
CO2 SERPL-SCNC: 22 MMOL/L (ref 20–33)
CREAT SERPL-MCNC: 0.66 MG/DL (ref 0.5–1.4)
EOSINOPHIL # BLD AUTO: 0.25 K/UL (ref 0–0.51)
EOSINOPHIL NFR BLD: 5.9 % (ref 0–6.9)
ERYTHROCYTE [DISTWIDTH] IN BLOOD BY AUTOMATED COUNT: 56.5 FL (ref 35.9–50)
GIANT PLATELETS BLD QL SMEAR: NORMAL
GLOBULIN SER CALC-MCNC: 3.6 G/DL (ref 1.9–3.5)
GLUCOSE SERPL-MCNC: 113 MG/DL (ref 65–99)
HCT VFR BLD AUTO: 37 % (ref 37–47)
HGB BLD-MCNC: 10.8 G/DL (ref 12–16)
LYMPHOCYTES # BLD AUTO: 0.88 K/UL (ref 1–4.8)
LYMPHOCYTES NFR BLD: 20.4 % (ref 22–41)
MAGNESIUM SERPL-MCNC: 1.6 MG/DL (ref 1.5–2.5)
MANUAL DIFF BLD: NORMAL
MCH RBC QN AUTO: 24.8 PG (ref 27–33)
MCHC RBC AUTO-ENTMCNC: 29.2 G/DL (ref 33.6–35)
MCV RBC AUTO: 84.9 FL (ref 81.4–97.8)
MONOCYTES # BLD AUTO: 0.25 K/UL (ref 0–0.85)
MONOCYTES NFR BLD AUTO: 5.9 % (ref 0–13.4)
MORPHOLOGY BLD-IMP: NORMAL
NEUTROPHILS # BLD AUTO: 2.84 K/UL (ref 2–7.15)
NEUTROPHILS NFR BLD: 66.1 % (ref 44–72)
NRBC # BLD AUTO: 0 K/UL
NRBC BLD-RTO: 0 /100 WBC
PLATELET # BLD AUTO: 149 K/UL (ref 164–446)
PLATELET BLD QL SMEAR: NORMAL
PMV BLD AUTO: 11.3 FL (ref 9–12.9)
POLYCHROMASIA BLD QL SMEAR: NORMAL
POTASSIUM SERPL-SCNC: 3.3 MMOL/L (ref 3.6–5.5)
PROT SERPL-MCNC: 6.1 G/DL (ref 6–8.2)
RBC # BLD AUTO: 4.36 M/UL (ref 4.2–5.4)
RBC BLD AUTO: PRESENT
SIGNIFICANT IND 70042: NORMAL
SIGNIFICANT IND 70042: NORMAL
SITE SITE: NORMAL
SITE SITE: NORMAL
SMUDGE CELLS BLD QL SMEAR: NORMAL
SODIUM SERPL-SCNC: 133 MMOL/L (ref 135–145)
SOURCE SOURCE: NORMAL
SOURCE SOURCE: NORMAL
T4 FREE SERPL-MCNC: 0.98 NG/DL (ref 0.53–1.43)
VANCOMYCIN TROUGH SERPL-MCNC: 24.4 UG/ML (ref 10–20)
VARIANT LYMPHS BLD QL SMEAR: NORMAL
WBC # BLD AUTO: 4.3 K/UL (ref 4.8–10.8)

## 2019-04-23 PROCEDURE — 700102 HCHG RX REV CODE 250 W/ 637 OVERRIDE(OP): Performed by: FAMILY MEDICINE

## 2019-04-23 PROCEDURE — A9270 NON-COVERED ITEM OR SERVICE: HCPCS | Performed by: FAMILY MEDICINE

## 2019-04-23 PROCEDURE — 80053 COMPREHEN METABOLIC PANEL: CPT

## 2019-04-23 PROCEDURE — 97110 THERAPEUTIC EXERCISES: CPT

## 2019-04-23 PROCEDURE — A9270 NON-COVERED ITEM OR SERVICE: HCPCS | Performed by: INTERNAL MEDICINE

## 2019-04-23 PROCEDURE — 99233 SBSQ HOSP IP/OBS HIGH 50: CPT | Performed by: INTERNAL MEDICINE

## 2019-04-23 PROCEDURE — 700102 HCHG RX REV CODE 250 W/ 637 OVERRIDE(OP): Performed by: INTERNAL MEDICINE

## 2019-04-23 PROCEDURE — 36415 COLL VENOUS BLD VENIPUNCTURE: CPT

## 2019-04-23 PROCEDURE — 700105 HCHG RX REV CODE 258: Performed by: INTERNAL MEDICINE

## 2019-04-23 PROCEDURE — 700111 HCHG RX REV CODE 636 W/ 250 OVERRIDE (IP): Performed by: INTERNAL MEDICINE

## 2019-04-23 PROCEDURE — 85027 COMPLETE CBC AUTOMATED: CPT

## 2019-04-23 PROCEDURE — 83735 ASSAY OF MAGNESIUM: CPT

## 2019-04-23 PROCEDURE — 80202 ASSAY OF VANCOMYCIN: CPT

## 2019-04-23 PROCEDURE — 97530 THERAPEUTIC ACTIVITIES: CPT

## 2019-04-23 PROCEDURE — 700117 HCHG RX CONTRAST REV CODE 255: Performed by: INTERNAL MEDICINE

## 2019-04-23 PROCEDURE — 71260 CT THORAX DX C+: CPT

## 2019-04-23 PROCEDURE — 84439 ASSAY OF FREE THYROXINE: CPT

## 2019-04-23 PROCEDURE — 85007 BL SMEAR W/DIFF WBC COUNT: CPT

## 2019-04-23 PROCEDURE — 770009 HCHG ROOM/CARE - ONCOLOGY SEMI PRI*

## 2019-04-23 RX ORDER — IBUPROFEN 400 MG/1
600 TABLET ORAL EVERY 6 HOURS PRN
Status: DISCONTINUED | OUTPATIENT
Start: 2019-04-23 | End: 2019-04-30 | Stop reason: HOSPADM

## 2019-04-23 RX ORDER — OXYCODONE HYDROCHLORIDE 5 MG/1
5-10 TABLET ORAL ONCE
Status: COMPLETED | OUTPATIENT
Start: 2019-04-23 | End: 2019-04-23

## 2019-04-23 RX ADMIN — QUETIAPINE FUMARATE 25 MG: 25 TABLET ORAL at 17:12

## 2019-04-23 RX ADMIN — FLUCONAZOLE 200 MG: 100 TABLET ORAL at 04:31

## 2019-04-23 RX ADMIN — PRAMIPEXOLE DIHYDROCHLORIDE 0.25 MG: 0.25 TABLET ORAL at 17:25

## 2019-04-23 RX ADMIN — OMEPRAZOLE 20 MG: 20 CAPSULE, DELAYED RELEASE ORAL at 04:31

## 2019-04-23 RX ADMIN — GABAPENTIN 300 MG: 300 CAPSULE ORAL at 17:12

## 2019-04-23 RX ADMIN — AMLODIPINE BESYLATE 10 MG: 10 TABLET ORAL at 04:31

## 2019-04-23 RX ADMIN — ACETAMINOPHEN 650 MG: 325 TABLET, FILM COATED ORAL at 04:30

## 2019-04-23 RX ADMIN — MEROPENEM 2 G: 1 INJECTION, POWDER, FOR SOLUTION INTRAVENOUS at 17:12

## 2019-04-23 RX ADMIN — IOHEXOL 80 ML: 350 INJECTION, SOLUTION INTRAVENOUS at 21:46

## 2019-04-23 RX ADMIN — VANCOMYCIN HYDROCHLORIDE 600 MG: 100 INJECTION, POWDER, LYOPHILIZED, FOR SOLUTION INTRAVENOUS at 21:53

## 2019-04-23 RX ADMIN — IBUPROFEN 600 MG: 400 TABLET, FILM COATED ORAL at 12:34

## 2019-04-23 RX ADMIN — CINACALCET HYDROCHLORIDE 60 MG: 30 TABLET, FILM COATED ORAL at 04:31

## 2019-04-23 RX ADMIN — GABAPENTIN 300 MG: 300 CAPSULE ORAL at 04:31

## 2019-04-23 RX ADMIN — MEROPENEM 2 G: 1 INJECTION, POWDER, FOR SOLUTION INTRAVENOUS at 08:24

## 2019-04-23 RX ADMIN — ACETAMINOPHEN 650 MG: 325 TABLET, FILM COATED ORAL at 16:20

## 2019-04-23 RX ADMIN — OXYCODONE HYDROCHLORIDE 10 MG: 5 TABLET ORAL at 10:13

## 2019-04-23 RX ADMIN — LOSARTAN POTASSIUM 50 MG: 50 TABLET ORAL at 04:31

## 2019-04-23 RX ADMIN — MEROPENEM 2 G: 1 INJECTION, POWDER, FOR SOLUTION INTRAVENOUS at 00:27

## 2019-04-23 ASSESSMENT — ENCOUNTER SYMPTOMS
CHILLS: 0
BACK PAIN: 1
VOMITING: 0
DIARRHEA: 0
MYALGIAS: 0
DIAPHORESIS: 0
SHORTNESS OF BREATH: 0
NAUSEA: 0
SPEECH CHANGE: 0
HEARTBURN: 0
FEVER: 0
WEAKNESS: 0
CONSTIPATION: 0
HEADACHES: 0
DIZZINESS: 0
MYALGIAS: 1
SENSORY CHANGE: 0
CLAUDICATION: 0
COUGH: 0
SORE THROAT: 0
NERVOUS/ANXIOUS: 0
PHOTOPHOBIA: 0
DEPRESSION: 0
INSOMNIA: 0
ABDOMINAL PAIN: 0
BLURRED VISION: 0

## 2019-04-23 ASSESSMENT — GAIT ASSESSMENTS
DISTANCE (FEET): 5
ASSISTIVE DEVICE: FRONT WHEEL WALKER
GAIT LEVEL OF ASSIST: MODERATE ASSIST

## 2019-04-23 ASSESSMENT — COGNITIVE AND FUNCTIONAL STATUS - GENERAL
MOVING TO AND FROM BED TO CHAIR: UNABLE
STANDING UP FROM CHAIR USING ARMS: A LOT
WALKING IN HOSPITAL ROOM: A LOT
MOVING FROM LYING ON BACK TO SITTING ON SIDE OF FLAT BED: UNABLE
MOBILITY SCORE: 9
CLIMB 3 TO 5 STEPS WITH RAILING: TOTAL
TURNING FROM BACK TO SIDE WHILE IN FLAT BAD: A LOT
SUGGESTED CMS G CODE MODIFIER MOBILITY: CM

## 2019-04-23 NOTE — THERAPY
"Attempted Occupational Therapy treatment X2 today. Pt refused X2. Pt appears to be getting weaker, needing to be fed by CNA. CNA stated, pt now takes 2 person assist to EOB and 3 person assist for transfers to the chair. Pt then \"sits for a few mins in chair c/o great pain and wants to get BTB\". RN also stated pt is confused most of the time with intermittent times of being oriented. Difficult to understand her speech. RN informed that OT attempted X2 today and that pt adamantly refused stating, \"I had therapy 2 times today\". Will continue OT POC as pt is able and willing to participate. RN informed and agreed.       "

## 2019-04-23 NOTE — PROGRESS NOTES
Infectious Disease Progress Note    Author: Ashely Hinojosa M.D. Date & Time of service: 4/23/2019  11:42 AM    Chief Complaint:  Multiple brain lesions  Iliopsoas lesion    Interval History:  68-year-old female with history of diabetes and breast cancer, admitted 3/8/2019 with abdominal pain, iliopsoas lesion, and an abnormal MRI with multiple brain lesions.  4/13/2019 patient is somewhat confused.  No fevers.  No new issues overnight.  4/14/2019 no fevers.  Somewhat confused.  WBC is 4.  4/15/2019 no fevers.  Complains of back pain and joint pain.  WBCs 5.5  4/16/2019 patient was transferred to telemetry overnight.  Much more lethargic.  4/17/2019-no fevers.  Wants to know the plan.  More awake and responsive.  4/18/2019 patient has no fevers.  She is drenching and sweats.  Complains of hip pain.  4/19/2019-low-grade fevers.  No new issues overnight.  Wants to walk.  Complains of pain.  4/20 afebrile WBC 3.9 patient is awake and answering questions appropriately.  She does endorse some pain on the right hip.  CT revealed enhancing fluid collection in the right gluteal muscle consistent with a small abscess and a loculated enhancing fluid collection in the right iliac muscle.  Plan for IR drainage today  4/21 afebrile WBC 4.3 patient transferred to oncology.  She is resting comfortably without any complaint.  Drain was not placed yesterday  4/22 afebrile WBC 4.8 patient is drowsy as she just woke up.  She complains of a cough when she drinks water.  Fluid collections too small for drainage by IR   4/23 afebrile WBC 4.3 patient complaining of back pain.  Denies any hip pain today    labs Reviewed    Review of Systems:  Review of Systems   Constitutional: Positive for malaise/fatigue. Negative for diaphoresis and fever.   HENT: Negative for hearing loss.    Respiratory: Negative for cough and shortness of breath.    Cardiovascular: Negative for chest pain and leg swelling.   Gastrointestinal: Negative for nausea  and vomiting.   Genitourinary: Negative for dysuria.   Musculoskeletal: Positive for back pain and joint pain. Negative for myalgias.        Right hip   Neurological: Negative for sensory change and speech change.   All other systems reviewed and are negative.      Hemodynamics:  Temp (24hrs), Av.4 °C (97.6 °F), Min:36.2 °C (97.1 °F), Max:36.9 °C (98.4 °F)  Temperature: 36.9 °C (98.4 °F)  Pulse  Av.9  Min: 59  Max: 153  Blood Pressure : 138/68       Physical Exam:  Physical Exam   Constitutional: She appears well-developed and well-nourished. No distress.   Elderly   HENT:   Head: Normocephalic and atraumatic.   Eyes: Pupils are equal, round, and reactive to light. EOM are normal. Right eye exhibits no discharge. Left eye exhibits no discharge.   Neck: Neck supple.   Cardiovascular: Normal rate and intact distal pulses.    Murmur heard.  Pulmonary/Chest: Effort normal and breath sounds normal. No respiratory distress. She has no wheezes.   Abdominal: Soft. Bowel sounds are normal. She exhibits no distension. There is no tenderness.   Musculoskeletal: She exhibits no edema, tenderness or deformity.   Right upper extremity PICC line   Lymphadenopathy:     She has no cervical adenopathy.   Neurological: She is alert.   Moves all extremities    Muffled speech       Skin: Skin is warm. No rash noted.   Nursing note and vitals reviewed.    Meds:    Current Facility-Administered Medications:   •  ibuprofen  •  hydrALAZINE  •  cinacalcet  •  amLODIPine  •  acetaminophen  •  meropenem  •  haloperidol lactate  •  QUEtiapine  •  gabapentin  •  labetalol  •  loperamide  •  losartan  •  MD Alert...Vancomycin per Pharmacy  •  Notify provider if pain remains uncontrolled **AND** Use the numeric rating scale (NRS-11) on regular floors and Critical-Care Pain Observation Tool (CPOT) on ICUs/Trauma to assess pain **AND** Pulse Ox (Oximetry) **AND** Pharmacy Consult Request **AND** If patient difficult to arouse and/or has  respiratory depression, stop any opiates that are currently infusing and call a Rapid Response. **AND** [DISCONTINUED] oxyCODONE immediate-release **AND** [DISCONTINUED] oxyCODONE immediate-release **AND** [DISCONTINUED] morphine injection  •  omeprazole  •  ondansetron  •  ondansetron  •  pramipexole  •  Respiratory Care per Protocol  •  vancomycin    Labs:  Recent Labs      04/21/19 0222 04/22/19 0040  04/23/19   0026   WBC  4.3*  4.8  4.3*   RBC  4.46  4.61  4.36   HEMOGLOBIN  11.2*  11.6*  10.8*   HEMATOCRIT  36.0*  36.8*  37.0   MCV  80.7*  79.8*  84.9   MCH  25.1*  25.2*  24.8*   RDW  52.4*  52.2*  56.5*   PLATELETCT  129*  144*  149*   MPV  10.5  10.7  11.3   NEUTSPOLYS  54.50  52.60  66.10   LYMPHOCYTES  17.80*  16.20*  20.40*   MONOCYTES  16.90*  17.60*  5.90   EOSINOPHILS  9.40*  12.20*  5.90   BASOPHILS  0.90  0.60  1.70   RBCMORPHOLO   --    --   Present     Recent Labs      04/21/19 0222 04/22/19 0040  04/23/19   0026   SODIUM  136  139  133*   POTASSIUM  4.6  3.8  3.3*   CHLORIDE  109  108  107   CO2  23  23  22   GLUCOSE  102*  90  113*   BUN  24*  20  21     Recent Labs      04/21/19 0222 04/22/19   0040  04/23/19   0026   ALBUMIN  2.7*  2.7*  2.5*   TBILIRUBIN  0.5  0.5  0.5   ALKPHOSPHAT  231*  228*  206*   TOTPROTEIN  6.7  6.5  6.1   ALTSGPT  14  16  15   ASTSGOT  40  39  39   CREATININE  0.72  0.63  0.66       Imaging:  CT abdomen and pelvis on 4/19/19  1.  Enhancing fluid collection in the right gluteal muscle, appearance compatible with small abscess.  2.  Loculated enhancing fluid collection in the right iliac is muscle, corresponding with site of prior drain, appearance compatible with reaccumulation of abscess.  3.  Fluid-filled distention of small bowel suggests ileus  4.  Atherosclerosis  5.  Trace pericardial effusion      Micro:  Results     Procedure Component Value Units Date/Time    BLOOD CULTURE [[id number]] Collected:  04/18/19 1536    Order Status:  Completed Specimen:   "Blood from Peripheral Updated:  04/19/19 0813     Significant Indicator NEG     Source BLD     Site PERIPHERAL     Blood Culture No Growth    Note: Blood cultures are incubated for 5 days and  are monitored continuously.Positive blood cultures  are called to the RN and reported as soon as  they are identified.      Narrative:       Per Hospital Policy: Only change Specimen Src: to \"Line\" if  specified by physician order.    BLOOD CULTURE [[id number]] Collected:  04/18/19 1536    Order Status:  Completed Specimen:  Blood from Peripheral Updated:  04/19/19 0813     Significant Indicator NEG     Source BLD     Site PERIPHERAL     Blood Culture No Growth    Note: Blood cultures are incubated for 5 days and  are monitored continuously.Positive blood cultures  are called to the RN and reported as soon as  they are identified.      Narrative:       Per Hospital Policy: Only change Specimen Src: to \"Line\" if  specified by physician order.    URINALYSIS [[id number]]     Order Status:  No result Specimen:  Urine from Urine, Clean Catch           Assessment:  Active Hospital Problems    Diagnosis   • *Lesion of brain [G93.9]   • Mass of iliopsoas muscle group [M62.89]   • Encephalopathy [G93.40]   • History of breast cancer [Z85.3]   • Diabetes mellitus type 2 in obese (HCC) [E11.69, E66.9]       Plan:  Multiple brain lesions-likely abscesses versus metastatic lesions  Leukopenia  Confusion improving and intermittent  MRI with scattered small lesions incl aaron, too small to biopsy per neurosurgery  TTE neg; CHAS neg  Bcxs neg  Repeat CT scan on 3/30/2019 shows decrease in the size of the brain lesions, supporting that these are abscesses  Repeat MRI brain    Iliopsoas abscess, on treatment.  Persistent and worse from CT on 4/19/19.   CT + 2.6 cm lesion in the right iliopsoas region  S/p aspiration +WBCs-cultures negative to date  Bcxs neg  D/c cefepime 03/26 secondary to confusion  Tolerating Meropenem started 03/26  MRI w/ " contrast lumbar, pelvis 03/27 - Iliopsoas and gluteal fluid collections noted.    Drain placed 3/29/2019.  Cultures negative  Another drain was placed on 4/4/2019 and it shows purulent fluid  Repeat CT from 4/8 with resolution of the fluid collections after drainage  Repeat CT on 4/19 revealed right gluteal muscle abscess in addition to recurrent abscess in the right iliac muscle.  Fluid collections too small for drainage by IR  Continue meropenem  Duration of antibiotics clinical  Patient will need a repeat CT of the abdomen and pelvis later this week to assess for improvement/resolution of right gluteal muscle abscess and right iliac muscle abscess    Fevers, resolved  Repeat blood cultures x2 are negative from 4/18/2019  UA shows some yeast  Repeat CT scan of the pelvis shows reaccumulation of the abscesses  Give short course of Diflucan.  Plan for 5-day course total.  Stop date 4/23/19 today    Right hip hardware  This is the likely source of infection.  Recommend hardware removal.   However per orthopedics this hardware needs to be in for 6 months.  Antibiotics per above    Type 2 diabetes mellitus  Hemoglobin A1c 6.3  Keep BS under 150 to help control current infection    Altered mental status, improved overall  Appears to be waxing and waning  Multifactorial, likely large contribution from her multiple brain abscesses, monitor  The CT scan done on 4/13/2019 shows persistence of the lesions    H/o breast cancer    I have performed a physical exam and reviewed and updated ROS and plan today 4/23/2019.  In review of yesterday's note 4/22/2019, there are no changes except as documented above.    Discussed with Dr. Keller

## 2019-04-23 NOTE — PROGRESS NOTES
Pharmacy Kinetics 68 y.o. female on vancomycin day # 30 2019    Currently on Vancomycin 600 mg iv q24hr ()    Indication for Treatment: possible brain abscesses/ iliopsoas abscess     Pertinent history per medical record: Admitted on 3/8/2019 for iliopsoas mass and multiple brain lesions. There is concern that this is infectious. All cultures are negative to date, but were all drawn while on antibiotics. CHAS was negative. Patient has a history of breast cancer and DM. ID is following. Plan for completion of abx (6 weeks) in house prior to transfer to [facility] in [place].      Other antibiotics: Meropenem 2g IV q8h     Allergies: Patient has no known allergies.      List concerns for renal function: age, and prolonged vancomycin duration, elevated BUN/SCr ratio, low albumin     Pertinent cultures to date:   19: wound - right gluteal mass: negative   19: blood - peripheral x 2: negative   19: wound - right buttock: negative   19: blood - peripheral x 2: negative   19: wound - retroperitoneal drain aspirate: negative   19:PBCx2:NGTD     MRSA nares swab if pneumonia is a concern (ordered/positive/negative/n-a): n/a    Recent Labs      19   0222  19   0040  19   0026   WBC  4.3*  4.8  4.3*   NEUTSPOLYS  54.50  52.60  66.10     Recent Labs      19   0222  19   0040  19   0026   BUN  24*  20  21   CREATININE  0.72  0.63  0.66   ALBUMIN  2.7*  2.7*  2.5*     No results for input(s): VANCOTROUGH, VANCOPEAK, VANCORANDOM in the last 72 hours.  Intake/Output Summary (Last 24 hours) at 19 1402  Last data filed at 19 1753   Gross per 24 hour   Intake              946 ml   Output                0 ml   Net              946 ml      /68   Pulse 77   Temp 36.9 °C (98.4 °F) (Temporal)   Resp 18   Ht 1.524 m (5')   Wt 64.6 kg (142 lb 6.7 oz)   SpO2 92%  Temp (24hrs), Av.4 °C (97.6 °F), Min:36.2 °C (97.1 °F), Max:36.9 °C (98.4  °F)      A/P   1. Vancomycin dose change: none  2. Next vancomycin level: 1930 4/23/19  3. Goal trough: 16-20mcg/mL  4. Comments: Renal function appears stable with adequate voids. Discussed duration of therapy with Dr. Keller, at current time endpoint is clinical per ID. Continue current regimen. Pharmacy will monitor/adjust as needed per protocol.     ZULEIKA Bojorquez Pharm.D.

## 2019-04-23 NOTE — DISCHARGE PLANNING
"Lengthily phone conversation with brother Arsh. He is frrustrated with his sister's condition. Stating \"It it so difficult to get information about her condition and current status. We really feel that we have no choice but to send her to a place in [place].\" This RNCM explained that SNFs in [place] have accepted her but that there is other options if she can travel. He asked about possible referral to LTAC in [place], we had discussed this option in the past. PT could be transferred there by ambulance possibily and would not have to pay for transport.   This RNCM located an LTAC close to where PTs brother lives called [facility]. Obtained verbal consent to send referral to this hospital her brother stated that he feels this is a better option becuause she will be close to family and be in a hospital setting.  PT is still on IV antibiotics with stop date of May 20. Will discuss this option with the hospitalist and obtain referral if this is a possibility.   "

## 2019-04-23 NOTE — PROGRESS NOTES
"Pt was sitting up to chair and started screaming out and crying that her left hip hurt. She rated it 11/10 and did appear uncomfortable.  Spoke with Dr. Keller; a 1x dose of oxycodone was ordered.   Administered pain med, then pt said \"can I have a shower now?\" Explained to her that no, if her pain is that high she wouldn't be able to do something such as a shower. She now wants to get back to bed.   "

## 2019-04-23 NOTE — CARE PLAN
Problem: Safety  Goal: Will remain free from injury  Outcome: PROGRESSING AS EXPECTED      Problem: Mobility  Goal: Risk for activity intolerance will decrease  Outcome: PROGRESSING SLOWER THAN EXPECTED      Problem: Urinary Elimination:  Goal: Ability to reestablish a normal urinary elimination pattern will improve  Outcome: PROGRESSING SLOWER THAN EXPECTED  Patient continues to be incontinent throughout shift. Does not call staff to inform them of urge to urinate.

## 2019-04-23 NOTE — THERAPY
"Pt demonstrating decreaed strength and balance. Pt w/posterior lean in sitting and standing w/decreased self-righting, requiring physical assist. Pt w/decreased sequencing and weight shifting for functional ambulation, requiring maximal cuing and facilitation for a transfer to chair. Pt d=continues to present w/decreased ROM of B ankles, requiring PROM. Pt would benefit from further acute PT txs to progress towards goals and independence. Recommend post acute placement at an inpatient transitional care facility to address deficits.    Physical Therapy Treatment completed.   Bed Mobility:  Supine to Sit: Moderate Assist  Transfers: Sit to Stand: Moderate Assist  Gait: Level Of Assist: Moderate Assist with Front-Wheel Walker       Plan of Care: Will benefit from Physical Therapy 4 times per week    See \"Rehab Therapy-Acute\" Patient Summary Report for complete documentation.       "

## 2019-04-23 NOTE — PROGRESS NOTES
"Received report from [facility], assumed care of pt 0700.  Pt able to answer questions, not sure she's oriented to situation, states here because \"I can't walk\".   PIV patent, SL and right midline patent, ABX now infusing, no blood return noted, dressing CDI. VSS.   Sister is at bedside. Pt states she is comfortable. We are assisting her with meals, she is a 1:1 feed.  Pt is Q-2 turns.   "

## 2019-04-23 NOTE — DISCHARGE PLANNING
Agency/Facility Name: [facility]   Spoke To: Rajwinder (admissions)   Outcome: Referral sent per Choice form @ Referral still pending. [facility] is concerned with DC plan. Rajwinder requested [facility] to call back when Pt is ready to DC to further discuss.     Agency/Facility Name: [facility]  Spoke To: Keren (admissions)   Outcome: referral still pending. Keren requested updated therapy notes.       Rufina NGUYENCM notified.

## 2019-04-24 ENCOUNTER — APPOINTMENT (OUTPATIENT)
Dept: RADIOLOGY | Facility: MEDICAL CENTER | Age: 69
DRG: 981 | End: 2019-04-24
Attending: INTERNAL MEDICINE
Payer: MEDICARE

## 2019-04-24 LAB
ALBUMIN SERPL BCP-MCNC: 2.4 G/DL (ref 3.2–4.9)
ALBUMIN/GLOB SERPL: 0.6 G/DL
ALP SERPL-CCNC: 239 U/L (ref 30–99)
ALT SERPL-CCNC: 18 U/L (ref 2–50)
ANION GAP SERPL CALC-SCNC: 7 MMOL/L (ref 0–11.9)
AST SERPL-CCNC: 46 U/L (ref 12–45)
BASOPHILS # BLD AUTO: 0.8 % (ref 0–1.8)
BASOPHILS # BLD: 0.04 K/UL (ref 0–0.12)
BILIRUB SERPL-MCNC: 0.5 MG/DL (ref 0.1–1.5)
BUN SERPL-MCNC: 18 MG/DL (ref 8–22)
CALCIUM SERPL-MCNC: 10.5 MG/DL (ref 8.5–10.5)
CHLORIDE SERPL-SCNC: 102 MMOL/L (ref 96–112)
CO2 SERPL-SCNC: 25 MMOL/L (ref 20–33)
CREAT SERPL-MCNC: 0.66 MG/DL (ref 0.5–1.4)
EOSINOPHIL # BLD AUTO: 0.84 K/UL (ref 0–0.51)
EOSINOPHIL NFR BLD: 16.8 % (ref 0–6.9)
ERYTHROCYTE [DISTWIDTH] IN BLOOD BY AUTOMATED COUNT: 54.1 FL (ref 35.9–50)
GLOBULIN SER CALC-MCNC: 4 G/DL (ref 1.9–3.5)
GLUCOSE SERPL-MCNC: 76 MG/DL (ref 65–99)
HCT VFR BLD AUTO: 34.8 % (ref 37–47)
HGB BLD-MCNC: 10.5 G/DL (ref 12–16)
LYMPHOCYTES # BLD AUTO: 0.93 K/UL (ref 1–4.8)
LYMPHOCYTES NFR BLD: 18.5 % (ref 22–41)
MANUAL DIFF BLD: NORMAL
MCH RBC QN AUTO: 24.8 PG (ref 27–33)
MCHC RBC AUTO-ENTMCNC: 30.2 G/DL (ref 33.6–35)
MCV RBC AUTO: 82.1 FL (ref 81.4–97.8)
MONOCYTES # BLD AUTO: 0.17 K/UL (ref 0–0.85)
MONOCYTES NFR BLD AUTO: 3.4 % (ref 0–13.4)
MORPHOLOGY BLD-IMP: NORMAL
NEUTROPHILS # BLD AUTO: 3.03 K/UL (ref 2–7.15)
NEUTROPHILS NFR BLD: 60.5 % (ref 44–72)
NRBC # BLD AUTO: 0.02 K/UL
NRBC BLD-RTO: 0.4 /100 WBC
PLATELET # BLD AUTO: 143 K/UL (ref 164–446)
PLATELET BLD QL SMEAR: NORMAL
PMV BLD AUTO: 10.3 FL (ref 9–12.9)
POTASSIUM SERPL-SCNC: 3.4 MMOL/L (ref 3.6–5.5)
PROT SERPL-MCNC: 6.4 G/DL (ref 6–8.2)
RBC # BLD AUTO: 4.24 M/UL (ref 4.2–5.4)
RBC BLD AUTO: NORMAL
SODIUM SERPL-SCNC: 134 MMOL/L (ref 135–145)
WBC # BLD AUTO: 5 K/UL (ref 4.8–10.8)

## 2019-04-24 PROCEDURE — 99233 SBSQ HOSP IP/OBS HIGH 50: CPT | Performed by: INTERNAL MEDICINE

## 2019-04-24 PROCEDURE — 85027 COMPLETE CBC AUTOMATED: CPT

## 2019-04-24 PROCEDURE — 700117 HCHG RX CONTRAST REV CODE 255: Performed by: INTERNAL MEDICINE

## 2019-04-24 PROCEDURE — 70553 MRI BRAIN STEM W/O & W/DYE: CPT

## 2019-04-24 PROCEDURE — 700102 HCHG RX REV CODE 250 W/ 637 OVERRIDE(OP): Performed by: FAMILY MEDICINE

## 2019-04-24 PROCEDURE — 700111 HCHG RX REV CODE 636 W/ 250 OVERRIDE (IP): Performed by: INTERNAL MEDICINE

## 2019-04-24 PROCEDURE — 700102 HCHG RX REV CODE 250 W/ 637 OVERRIDE(OP): Performed by: INTERNAL MEDICINE

## 2019-04-24 PROCEDURE — 80053 COMPREHEN METABOLIC PANEL: CPT

## 2019-04-24 PROCEDURE — 700105 HCHG RX REV CODE 258: Performed by: INTERNAL MEDICINE

## 2019-04-24 PROCEDURE — A9585 GADOBUTROL INJECTION: HCPCS | Performed by: INTERNAL MEDICINE

## 2019-04-24 PROCEDURE — 85007 BL SMEAR W/DIFF WBC COUNT: CPT

## 2019-04-24 PROCEDURE — 700111 HCHG RX REV CODE 636 W/ 250 OVERRIDE (IP): Performed by: FAMILY MEDICINE

## 2019-04-24 PROCEDURE — 36415 COLL VENOUS BLD VENIPUNCTURE: CPT

## 2019-04-24 PROCEDURE — A9270 NON-COVERED ITEM OR SERVICE: HCPCS | Performed by: FAMILY MEDICINE

## 2019-04-24 PROCEDURE — 99232 SBSQ HOSP IP/OBS MODERATE 35: CPT | Performed by: INTERNAL MEDICINE

## 2019-04-24 PROCEDURE — A9270 NON-COVERED ITEM OR SERVICE: HCPCS | Performed by: INTERNAL MEDICINE

## 2019-04-24 PROCEDURE — 770009 HCHG ROOM/CARE - ONCOLOGY SEMI PRI*

## 2019-04-24 RX ORDER — GADOBUTROL 604.72 MG/ML
6 INJECTION INTRAVENOUS ONCE
Status: COMPLETED | OUTPATIENT
Start: 2019-04-24 | End: 2019-04-24

## 2019-04-24 RX ADMIN — AMLODIPINE BESYLATE 10 MG: 10 TABLET ORAL at 04:56

## 2019-04-24 RX ADMIN — MEROPENEM 2 G: 1 INJECTION, POWDER, FOR SOLUTION INTRAVENOUS at 17:18

## 2019-04-24 RX ADMIN — ACETAMINOPHEN 650 MG: 325 TABLET, FILM COATED ORAL at 09:09

## 2019-04-24 RX ADMIN — GABAPENTIN 300 MG: 300 CAPSULE ORAL at 17:18

## 2019-04-24 RX ADMIN — MEROPENEM 2 G: 1 INJECTION, POWDER, FOR SOLUTION INTRAVENOUS at 00:18

## 2019-04-24 RX ADMIN — OMEPRAZOLE 20 MG: 20 CAPSULE, DELAYED RELEASE ORAL at 04:56

## 2019-04-24 RX ADMIN — LOSARTAN POTASSIUM 50 MG: 50 TABLET ORAL at 04:56

## 2019-04-24 RX ADMIN — IBUPROFEN 600 MG: 400 TABLET, FILM COATED ORAL at 11:19

## 2019-04-24 RX ADMIN — GABAPENTIN 300 MG: 300 CAPSULE ORAL at 04:56

## 2019-04-24 RX ADMIN — ACETAMINOPHEN 650 MG: 325 TABLET, FILM COATED ORAL at 17:18

## 2019-04-24 RX ADMIN — HALOPERIDOL LACTATE 2 MG: 5 INJECTION, SOLUTION INTRAMUSCULAR at 12:09

## 2019-04-24 RX ADMIN — VANCOMYCIN HYDROCHLORIDE 500 MG: 100 INJECTION, POWDER, LYOPHILIZED, FOR SOLUTION INTRAVENOUS at 21:23

## 2019-04-24 RX ADMIN — HYDRALAZINE HYDROCHLORIDE 20 MG: 20 INJECTION INTRAMUSCULAR; INTRAVENOUS at 21:23

## 2019-04-24 RX ADMIN — QUETIAPINE FUMARATE 25 MG: 25 TABLET ORAL at 17:18

## 2019-04-24 RX ADMIN — CINACALCET HYDROCHLORIDE 60 MG: 30 TABLET, FILM COATED ORAL at 04:56

## 2019-04-24 RX ADMIN — GADOBUTROL 6 ML: 604.72 INJECTION INTRAVENOUS at 15:58

## 2019-04-24 RX ADMIN — MEROPENEM 2 G: 1 INJECTION, POWDER, FOR SOLUTION INTRAVENOUS at 09:09

## 2019-04-24 ASSESSMENT — ENCOUNTER SYMPTOMS
DIARRHEA: 0
SPEECH CHANGE: 0
SORE THROAT: 0
DIZZINESS: 0
DIAPHORESIS: 0
DEPRESSION: 0
NAUSEA: 0
WEAKNESS: 0
HEADACHES: 0
MYALGIAS: 1
VOMITING: 0
MYALGIAS: 0
SHORTNESS OF BREATH: 0
NERVOUS/ANXIOUS: 0
ABDOMINAL PAIN: 0
BLURRED VISION: 0
PHOTOPHOBIA: 0
COUGH: 0
BACK PAIN: 1
FEVER: 0
CONSTIPATION: 0
HEARTBURN: 0
CLAUDICATION: 0
SENSORY CHANGE: 0
INSOMNIA: 0
CHILLS: 0

## 2019-04-24 NOTE — PROGRESS NOTES
"[facility] Medicine Daily Progress Note    Date of Service  4/24/2019    Chief Complaint  68 y.o. female admitted 3/8/2019 with right hip pain, fever and abnormal brain mri.    Hospital Course    Patient started on empiric antibiotics given fever.  She had abnormal findings on MRI brain and CT showed \"lesion\" on right iliopsoas.  This lesion was biopsied and turned out to be abscess.  She has history of breast cancer and there is also concern of brain lesions being metastatic though their appearance is not typical of this.      Interval Problem Update  3/12 Discussed with Dr Turner for second opinion of brain lesions and based on appearance not able to r/o or r/i any diagnosis.  Given patient's presentation of confusion, hip pain and fever  - this is more supportive of infectious etiology rather than malignant.  Fluid from right gluteal area sent to micro and as of yet - no growth.  Continue zosyn for now.  3/13 Patient with slight improvement in mentation.  Blood cultures and abscess cultures are showing no growth at this time.  TTE being done while I examined patient - no evidence of vegetations on this test.  ID consultation pending - d/w Dr Garcia.  3/14 Patient feeling well today, her pain is present but not as bad as prior to admission.  She was able to follow the conversation today and is agreeable to move forward with the CHAS tomorrow.  I spoke with her brother, René, and updated him on condition yesterday afternoon also.  Will also have PICC placed in next few days as long as blood cultures remain negative as I expect a prolonged antibiotic course of treatment.  3/15 Patient without new complaints, states she needs help to move in bed.  CHAS showed no evidence of vegetations and regular diet resumed.  Culture remains negative and biopsy of brain lesion may prove needed - will watch through the weekend and if mentation does not continue to improve, will discuss with neurosurgery on Monday.  3/16 Patient states " she is okay today, mentation has waxed and waned without significant improvement.  She was febrile earlier today.  No other acute events.  3/17 Patient with significant improvement in mentation today, she is aware of her hospitalization, not falling asleep mid sentence.  Her pain mediations were held most of yesterday and likely far too strong for patient and were affecting her awareness.  Oxycodone 5 discontinued and will use tramadol instead unless pain not well controlled.  CT brain with contrast ordered as a quick scan for comparison to MRI.    3/18 Patient feeling same today, discussed need to discuss with neurosurgery for possible biopsy.  Discussed with Dr Nguyen, he reviewed MRI and CT brain and he feels they are likely metastatic lesions but are far too small to biopsy.  His recommendation is to continue with antibiotics and re-image in 2 weeks to see if any change that would be amenable to biopsy or that would further declare infection.  3/26 Patient mental status continues to wax and wane.  ID ordered MRI lumbar, pelvis and sacrum for evaluation for source of infection, patient with continued low back pain and no real improvement since I saw her 7 days prior with continued antibiotics during this time.  Cultures have not growth pathogen, repeat MRI 3/28.   3/27 Patient somnolent most of the day today, MRI's completed and showing 2 areas of fluid collections, given her history are likely abscesses and will require drainage, CT guided drainage requested and patient NPO at MN for this.  D/w ID - cultures will be done on fluid collection.  Repeat CT brain ordered for tomorrow.  3/28 Patient up to chair, diet resumed as lovenox given this am and drainage of abscesses deferred until tomorrow.  Vanco/merrem to continue and cultures will be collected from abscesses.  3/29 Patient went to IR today, had drain placed in the right buttock into abscess cavity and fluid sent for culture.  Potassium is slightly low,  continue with PO replacement.  HR has improved from yesterday but patient PO intake still not at normal level, increase IVF rate to 100cc/hour.  3/30 Patient without fever since drain placed right buttock.  Purulent material in ÁNGEL bulb. Cultures thus far are still showing no growth.  Antibiotics to continue.  CT head ordered to evaluate change in past 2 weeks as recommended by neurosurgery.  3/31 Patient with fevers overnight - was altered during this time but has recovered.  She denies pain when examined by me.  She still has purulent material in the drainage tubing.  Will continue with current IV antibiotics - 6 weeks total antibiotics suspected - end date would be 4/24/19.  D/w ID.  4/1 Patient remains intermittently somnolent.  She wakes easily but falls back to sleep quickly.  She remained afebrile overnight.  ÁNGEL drain continues to drain purulent material.  ABX through 4/24 - once accepting facility found, patient okay to transfer with midline intact for completion of antibiotics.  4/9 Patient somnolent when examined.  CT showed resolution of fluid collection where ÁNGEL in place.  RN to remove ÁNGEL today.  All cultures still remain without growth.  4/10 Patient seen when working with OT, she is awake but gets off task quickly and is easily fatigued.  ÁNGEL drain removed yesterday.  Patient states she has pain in the lumbar spine when she coughs - just above her surgical area.  She has been in bed for nearly 1 month and this is likely not helping her back pain - educated need for OOB as often as tolerated.  4/11 Patient had bowel incontinence prior to my entering room, states she thinks she needs a bed pan.  CNA notified.  She continues to have low back pain in the area of her surgery and given her bed-bound status during her infection and intermittent confusion, this is not unexpected.  Continuing with therapy recommended and patient is 1:1 feeding given her weakness to feed herself.  4/12 Patient tearful this am, she  is concerned that she will not walk again, support given and educated that she is weak and the inability to walk is only temporary.  She needs to work with therapy and get stronger and I need to stop her narcotics and only give tyelnol/ibuprofen for pain management along with non-narcotic alternatives.  I spoke to patient's brother, René, at her request.  4/13 Patient up to chair when evaluated, mentation improved with discontinuation of narcotics. Brother is coming to visit this weekend and she is looking forward to this.  Ortho recommendations still pending - per ID she spoke to Dr Porter who is consulting Dr Sandra for recommendations.  4/14 Patient up to chair with assistance today.  CT brain remains same as prior 2 weeks ago.  Still pending ortho consultation.  4/15 Patient to be seen by ortho today per Dr Porter.  D/w dietary - patient with weight loss and poor po intake, she needs to be a 1:1 feed patient and if she fails this, will likely need tube feeding to get adequate nutrition.    4/23 Patient up to chair with sister much of the morning and had uncontrolled pain following this.  One time dose of oxycodone given with great relief but patient sleeping since that dose.  Sister is concerned about her breathing as she is only taking small breaths at times, given need for re-imaging of abdomen/pelvis, will add on chest for further evaluation.  Lungs sounded clear on ascultation.  Brother also up visiting with his wife.  Ortho did consult, recommended hardware needs to remain in place for at least 6 months or non-union likely to occur, continue with antibiotics.  4/24 Patient in bed today, much less somnolent than yesterday as no narcotics given.  She complains of pain but still remains lethargic also at times.  Tylenol and ibuprofen to alternate for pain control.  ABX to continue, fluid collections very similar in size when compared to previous imaging - gluteal pocket is 18mm (20mm) and ilacus is  2.7cm from 3.4x2.0cm.  These fluid collections are still to small to drain.  MRI brain is pending.    Consultants/Specialty  ID - Ashish  Ortho - s/o    Code Status  full    Disposition  SNF/LTAC in [place]    Review of Systems  Review of Systems   Constitutional: Negative for chills and fever.   HENT: Negative for congestion and sore throat.    Eyes: Negative for blurred vision and photophobia.   Respiratory: Negative for cough and shortness of breath.    Cardiovascular: Negative for chest pain, claudication and leg swelling.   Gastrointestinal: Negative for abdominal pain, constipation, diarrhea, heartburn, nausea and vomiting.   Genitourinary: Negative for dysuria and hematuria.   Musculoskeletal: Positive for back pain (lumbar/sacral area ) and myalgias (right hip and buttock pain.). Negative for joint pain.   Skin: Negative for itching and rash.   Neurological: Negative for dizziness, sensory change, speech change, weakness and headaches.   Psychiatric/Behavioral: Negative for depression. The patient is not nervous/anxious and does not have insomnia.         Physical Exam  Temp:  [36.2 °C (97.1 °F)-36.7 °C (98 °F)] 36.7 °C (98 °F)  Pulse:  [86-90] 90  Resp:  [18-20] 19  BP: (130-158)/(70-79) 148/76  SpO2:  [92 %-95 %] 94 %    Physical Exam   Constitutional: She is oriented to person, place, and time. She appears well-developed and well-nourished. No distress.   HENT:   Head: Normocephalic and atraumatic.   Eyes: Conjunctivae are normal. No scleral icterus.   Neck: Neck supple. No JVD present.   Cardiovascular: Normal rate, regular rhythm and normal heart sounds.  Exam reveals no gallop and no friction rub.    No murmur heard.  Pulmonary/Chest: Effort normal and breath sounds normal. No respiratory distress. She exhibits no tenderness.   Abdominal: Soft. Bowel sounds are normal. She exhibits no distension. There is no guarding.   Musculoskeletal: She exhibits no edema or tenderness.          Lymphadenopathy:      She has no cervical adenopathy.   Neurological: She is alert and oriented to person, place, and time. No cranial nerve deficit.   Mentation labile     Skin: Skin is warm and dry. She is not diaphoretic. No erythema. No pallor.   Psychiatric: She has a normal mood and affect. Her behavior is normal.   Nursing note and vitals reviewed.      Fluids  No intake or output data in the 24 hours ending 04/24/19 1548    Laboratory  Recent Labs      04/22/19   0040  04/23/19   0026  04/24/19   0026   WBC  4.8  4.3*  5.0   RBC  4.61  4.36  4.24   HEMOGLOBIN  11.6*  10.8*  10.5*   HEMATOCRIT  36.8*  37.0  34.8*   MCV  79.8*  84.9  82.1   MCH  25.2*  24.8*  24.8*   MCHC  31.5*  29.2*  30.2*   RDW  52.2*  56.5*  54.1*   PLATELETCT  144*  149*  143*   MPV  10.7  11.3  10.3     Recent Labs      04/22/19   0040  04/23/19   0026  04/24/19   0026   SODIUM  139  133*  134*   POTASSIUM  3.8  3.3*  3.4*   CHLORIDE  108  107  102   CO2  23  22  25   GLUCOSE  90  113*  76   BUN  20  21  18   CREATININE  0.63  0.66  0.66   CALCIUM  11.6*  11.2*  10.5                   Imaging  CT-CHEST,ABDOMEN,PELVIS WITH   Final Result      1.  Limited evaluation of the thorax due to extensive patient respiratory motion artifact.      2.  Borderline enlarged bilateral axillary lymph nodes.      3.  Linear atelectasis within the lungs with patchy groundglass opacity bilaterally.      4.  Again seen screws extending through the sacrum bilaterally. There is surrounding lucency and fragmentation of the sacrum as well as the right greater than left ilium and a right iliac fracture. It is uncertain if these represent metastatic lesions    with pathologic fracture versus insufficiency fractures.      5.  Fluid collections within the right gluteal and iliacis muscles.. The collection within the right gluteal muscle has unchanged while the fluid collection within the right iliacis muscle is increased somewhat in size. Differential diagnosis includes    hematoma  as well as abscess.         6.  Enlarged retroperitoneal, periportal and portacaval lymph nodes.      DX-CHEST-LIMITED (1 VIEW)   Final Result      No acute cardiac or pulmonary abnormalities are identified. Lung volumes are low.      CT-ABDOMEN-PELVIS WITH   Final Result         1.  Enhancing fluid collection in the right gluteal muscle, appearance compatible with small abscess.   2.  Loculated enhancing fluid collection in the right iliac is muscle, corresponding with site of prior drain, appearance compatible with reaccumulation of abscess.   3.  Fluid-filled distention of small bowel suggests ileus   4.  Atherosclerosis   5.  Trace pericardial effusion      CT-CTA CHEST PULMONARY ARTERY W/ RECONS   Final Result         1.  No large central pulmonary embolus is appreciated, evaluation of the subsegmental branches is essentially nondiagnostic due to motion artifacts. Additional imaging would be required for definitive exclusion of small distal pulmonary emboli.   2.  Trace pericardial effusion   3.  Hepatomegaly   4.  Atherosclerosis.   5.  Right pulmonary nodules, the largest is a 7.7 mm pulmonary nodule, see nodule follow-up recommendations below.      Low Risk: CT at 3-6 months, then consider CT at 18-24 months      High Risk: CT at 3-6 months, then at 18-24 months      Comments: Use most suspicious nodule as guide to management. Follow-up intervals may vary according to size and risk.      Low Risk - Minimal or absent history of smoking and of other known risk factors.      High Risk - History of smoking or of other known risk factors.      Note: These recommendations do not apply to lung cancer screening, patients with immunosuppression, or patients with known primary cancer.      Fleischner Society 2017 Guidelines for Management of Incidentally Detected Pulmonary Nodules in Adults         DX-CHEST-PORTABLE (1 VIEW)   Final Result         1.  Mild pulmonary edema and/or infiltrate   2.  Cardiomegaly       CT-HEAD WITH & W/O   Final Result      1.  No change in scattered hyperenhancing lesions throughout the brain, cerebellum and visualized brainstem. Some of them demonstrate mild associated edema, similar to prior study.   2.  No CT evidence of infarct or hemorrhage.      CT-NEEDLE BX-MUSCLE RIGHT   Final Result   Addendum 1 of 1   Addendum:   The biopsy/drainage was done utilizing low dose optimization CT techniques    including auto modulation for  imaging and low mA CT/fluoroscopy mode    for the procedure.      Final      CT-guided aspiration of pus like material in the right gluteal muscle lesion.      CT-ABDOMEN-PELVIS WITH   Final Result      1.  Resolution of right iliacus abscess with no associated fluid adjacent to the drainage catheter      2.  Interval removal of right gluteal drainage catheter      3.  Surgical screw traversing both sacroiliac joint with associated pathologic fracture of the right ilium and right medial sacrum      4.  Hepatomegaly      CT-DRAIN-HEMATOMA - SEROMA   Final Result      1.  CT-GUIDED RETROPERITONEAL (EXTRAPERITONEAL) RIGHT PELVIC ABSCESS DRAINAGE AT THE RIGHT ILIOPSOAS. ORDERS WERE WRITTEN FOR ONCE DAILY IRRIGATION OF THE CATHETER WITH 5 ML STERILE SALINE.      2.  THE CURRENT PLAN IS TO MONITOR DRAINAGE OUTPUT AND OBTAIN A FOLLOWUP CT SCAN IN 5-7 DAYS IF CLINICALLY INDICATED.      CT-ABDOMEN-PELVIS WITH   Final Result      1.  Rim-enhancing fluid collection in the right iliopsoas muscle may have increased in size slightly from the prior exam.      2.  Pigtail catheter in the right gluteus medius muscle. No residual fluid collection is appreciated.      3.  Pathologic fracture of the right ilium. Status post right SI joint fusion.      4.  Atherosclerosis.      5.  Cholelithiasis.      CT-HEAD WITH & W/O   Final Result      1.  Interval decrease in size and level of enhancement of multiple small punctate foci in both cerebral hemispheres and in the midbrain consistent  with improving septic emboli.      2.  No hemorrhage or mass effect.      CT-DRAIN-RETROPERITONEAL   Final Result      1.  CT GUIDED CATHETER DRAINAGE OF RIGHT GLUTEAL ABSCESS.   2.  THE CURRENT PLAN IS TO OBTAIN A FOLLOW-UP CT SCAN IN 5-7 DAYS IF INDICATED. ORDERS WERE WRITTEN FOR ONCE DAILY IRRIGATION OF THE CATHETER WITH 5 ML STERILE SALINE FOR 3 DAYS.   3. THE ILIOPSOAS COLLECTION WAS NOT AMENABLE TO CATHETER DRAINAGE AS INTERVENING BOWEL AND/OR BONY STRUCTURES OBSTRUCT A PATHWAY TO THIS COLLECTION AT THIS TIME.      MR-LUMBAR SPINE-WITH & W/O   Final Result      1.  There are multifocal enhancing fluid collections in the right iliopsoas muscles and gluteus muscles adjacent to the right ilium. Right iliopsoas fluid collection measures an approximately 3.9 x 2.9 cm. The right gluteal fluid collection measures an    approximately 3.5 x 2.8 cm in size. The differential diagnosis includes postsurgical seroma and abscess.   2.  Post operative and degenerative changes as described above.      MR-PELVIS-WITH & W/O AND SEQUENCES   Final Result      1.  Surgical screw traverses both sacroiliac joint across presumed pathologic fracture of the right sacrum and the hardware obscures the adjacent tissues.      2.  No other evidence for bony metastatic disease      3.  Right gluteal musculature rim-enhancing fluid collection measuring 3.4 x 2.8 cm. This could be a postoperative seroma versus abscess      4.  osteoarthritis of both hip joint      5.  Prior hysterectomy      IR-MIDLINE CATHETER INSERTION WO GUIDANCE > AGE 3   Final Result                  Ultrasound-guided midline placement performed by qualified nursing staff    as above.          CT-HEAD WITH   Final Result      Multiple subcentimeter too numerous to count enhancing masses scattered throughout the supratentorial and infratentorial brain. These demonstrate some surrounding vasogenic edema and most likely represent multifocal metastatic lesions. Other     considerations would include multifocal abscesses or septic emboli.      EC-CHAS W/O CONT   Final Result      EC-CHAS W/O CONT   Final Result      EC-ECHOCARDIOGRAM COMPLETE W/O CONT   Final Result      OUTSIDE IMAGES-CT CHEST/ABDOMEN/PELVIS   Final Result      OUTSIDE IMAGES-DX CHEST   Final Result      PW-WRSECMC-VTHHXQC FILM X-RAY   Final Result      OUTSIDE IMAGES-MR BRAIN   Final Result      OUTSIDE IMAGES-MR BRAIN   Final Result      MR-BRAIN-WITH & W/O    (Results Pending)        Assessment/Plan  * Lesion of brain   Assessment & Plan    IR Bx of psoas muscle ordered - was abscess  Continued treatment of infection  TTE and CHAS negative for vegetations.  MRI brain to repeat  Palliative consulted.  Vanco/merrem per ID           Encephalopathy   Assessment & Plan    Waxes and wanes.  Likely secondary to multiple brain lesions.  Avoid benzodiazepines and/or anticholinergic medications.  Avoid sedatives or hypnotics.  At baseline for now.       Mass of iliopsoas muscle group   Assessment & Plan    Return of fluid collection, repeat drainage in IR with cultures, drain placed 3/29 - 10ml purulent material drained and sent for culture - no growth a/o 3/30  Was on linezolid and cefepime and now on vanco/merrem - end date to be > 6 weeks of treatment as now clinical resolution needed  ID following   CHAS negative for endocarditis.  Patient has sacral screws, Orthopedic surgery recommended hardware to stay for at least 6 months to promote adequate healing and to continue with antibiotics for now.  4/20: Patient did not qualify for IR aspiration and drain placement for the above-mentioned abscesses as they were too small.  repeat CT abdomen/pelvis 4/23 - fluid collections still too small to drain  MRI brain pending.       Pancytopenia (HCC)   Assessment & Plan    Could be due to toxic effects of infection on bone marrow.  Afebrile.  ANC within normal limits.  Continue to monitor.  Gradually improving.  Resolved although  normal white blood cell count and normal ANC.  Mild thrombocytopenia.       Hyponatremia- (present on admission)   Assessment & Plan    Mildly low - doubt contribution to mentation         RLS (restless legs syndrome)- (present on admission)   Assessment & Plan    Pramipexole       Diabetes mellitus type 2 in obese (HCC)- (present on admission)   Assessment & Plan    Well controlled   Insulin sliding scale with hypoglycemia protocol.  Continue to monitor       History of breast cancer- (present on admission)   Assessment & Plan    Status post mastectomy.  Repeat CT showed decrease in size with antibiotics wishes consistent with abscesses rather than metastasis.  Continue IV antibiotis  Appreciate infectious disease recommendations.       COPD (chronic obstructive pulmonary disease) (HCC)- (present on admission)   Assessment & Plan    Currently maintaining saturation on room air.  Respiratory therapy per protocol.     Hypercalcemia- (present on admission)   Assessment & Plan    Mild   Continue with Sensipar.  Monitor and adjust Sensipar dose accordingly.       Sinus tachycardia- (present on admission)   Assessment & Plan    Likely due to combination of infection and volume loss.  Echocardiogram with normal ejection fraction and no significant valvular abnormalities.  Tachycardia has resolved     Urinary tract infection   Assessment & Plan    Culture remain no growth.       Fungal infection of the groin   Assessment & Plan    Resolved         History of deep vein thrombosis- (present on admission)   Assessment & Plan    History of deep vein thrombosis   Was on Rivaroxaban as outpatient.     Continues to be on hold as interventions often with drain placement/removal  Start prophylactic lovenox once no further interventions needed.           Essential hypertension- (present on admission)   Assessment & Plan    Restarted on losartan and furosemide with hold parameters.  Continue to monitor     Fever   Assessment & Plan     PRN Tylenol.  Continue monitor     GERD (gastroesophageal reflux disease)- (present on admission)   Assessment & Plan    Omeprazole     Chronic pain- (present on admission)   Assessment & Plan     Multifactorial  Likely secondary to abscess, fracture, bed bound status  Pain control, avoid narcotics given severe impact on patient's mentation.              VTE prophylaxis: scds

## 2019-04-24 NOTE — PROGRESS NOTES
"[facility] Medicine Daily Progress Note    Date of Service  4/23/2019    Chief Complaint  68 y.o. female admitted 3/8/2019 with right hip pain, fever and abnormal brain mri.    Hospital Course    Patient started on empiric antibiotics given fever.  She had abnormal findings on MRI brain and CT showed \"lesion\" on right iliopsoas.  This lesion was biopsied and turned out to be abscess.  She has history of breast cancer and there is also concern of brain lesions being metastatic though their appearance is not typical of this.      Interval Problem Update  3/12 Discussed with Dr Turner for second opinion of brain lesions and based on appearance not able to r/o or r/i any diagnosis.  Given patient's presentation of confusion, hip pain and fever  - this is more supportive of infectious etiology rather than malignant.  Fluid from right gluteal area sent to micro and as of yet - no growth.  Continue zosyn for now.  3/13 Patient with slight improvement in mentation.  Blood cultures and abscess cultures are showing no growth at this time.  TTE being done while I examined patient - no evidence of vegetations on this test.  ID consultation pending - d/w Dr Garcia.  3/14 Patient feeling well today, her pain is present but not as bad as prior to admission.  She was able to follow the conversation today and is agreeable to move forward with the CHAS tomorrow.  I spoke with her brother, René, and updated him on condition yesterday afternoon also.  Will also have PICC placed in next few days as long as blood cultures remain negative as I expect a prolonged antibiotic course of treatment.  3/15 Patient without new complaints, states she needs help to move in bed.  CHAS showed no evidence of vegetations and regular diet resumed.  Culture remains negative and biopsy of brain lesion may prove needed - will watch through the weekend and if mentation does not continue to improve, will discuss with neurosurgery on Monday.  3/16 Patient states " she is okay today, mentation has waxed and waned without significant improvement.  She was febrile earlier today.  No other acute events.  3/17 Patient with significant improvement in mentation today, she is aware of her hospitalization, not falling asleep mid sentence.  Her pain mediations were held most of yesterday and likely far too strong for patient and were affecting her awareness.  Oxycodone 5 discontinued and will use tramadol instead unless pain not well controlled.  CT brain with contrast ordered as a quick scan for comparison to MRI.    3/18 Patient feeling same today, discussed need to discuss with neurosurgery for possible biopsy.  Discussed with Dr Nguyen, he reviewed MRI and CT brain and he feels they are likely metastatic lesions but are far too small to biopsy.  His recommendation is to continue with antibiotics and re-image in 2 weeks to see if any change that would be amenable to biopsy or that would further declare infection.  3/26 Patient mental status continues to wax and wane.  ID ordered MRI lumbar, pelvis and sacrum for evaluation for source of infection, patient with continued low back pain and no real improvement since I saw her 7 days prior with continued antibiotics during this time.  Cultures have not growth pathogen, repeat MRI 3/28.   3/27 Patient somnolent most of the day today, MRI's completed and showing 2 areas of fluid collections, given her history are likely abscesses and will require drainage, CT guided drainage requested and patient NPO at MN for this.  D/w ID - cultures will be done on fluid collection.  Repeat CT brain ordered for tomorrow.  3/28 Patient up to chair, diet resumed as lovenox given this am and drainage of abscesses deferred until tomorrow.  Vanco/merrem to continue and cultures will be collected from abscesses.  3/29 Patient went to IR today, had drain placed in the right buttock into abscess cavity and fluid sent for culture.  Potassium is slightly low,  continue with PO replacement.  HR has improved from yesterday but patient PO intake still not at normal level, increase IVF rate to 100cc/hour.  3/30 Patient without fever since drain placed right buttock.  Purulent material in ÁNGEL bulb. Cultures thus far are still showing no growth.  Antibiotics to continue.  CT head ordered to evaluate change in past 2 weeks as recommended by neurosurgery.  3/31 Patient with fevers overnight - was altered during this time but has recovered.  She denies pain when examined by me.  She still has purulent material in the drainage tubing.  Will continue with current IV antibiotics - 6 weeks total antibiotics suspected - end date would be 4/24/19.  D/w ID.  4/1 Patient remains intermittently somnolent.  She wakes easily but falls back to sleep quickly.  She remained afebrile overnight.  ÁNGEL drain continues to drain purulent material.  ABX through 4/24 - once accepting facility found, patient okay to transfer with midline intact for completion of antibiotics.  4/9 Patient somnolent when examined.  CT showed resolution of fluid collection where ÁNGEL in place.  RN to remove ÁNGEL today.  All cultures still remain without growth.  4/10 Patient seen when working with OT, she is awake but gets off task quickly and is easily fatigued.  ÁNGEL drain removed yesterday.  Patient states she has pain in the lumbar spine when she coughs - just above her surgical area.  She has been in bed for nearly 1 month and this is likely not helping her back pain - educated need for OOB as often as tolerated.  4/11 Patient had bowel incontinence prior to my entering room, states she thinks she needs a bed pan.  CNA notified.  She continues to have low back pain in the area of her surgery and given her bed-bound status during her infection and intermittent confusion, this is not unexpected.  Continuing with therapy recommended and patient is 1:1 feeding given her weakness to feed herself.  4/12 Patient tearful this am, she  is concerned that she will not walk again, support given and educated that she is weak and the inability to walk is only temporary.  She needs to work with therapy and get stronger and I need to stop her narcotics and only give tyelnol/ibuprofen for pain management along with non-narcotic alternatives.  I spoke to patient's brother, René, at her request.  4/13 Patient up to chair when evaluated, mentation improved with discontinuation of narcotics. Brother is coming to visit this weekend and she is looking forward to this.  Ortho recommendations still pending - per ID she spoke to Dr Porter who is consulting Dr Sandra for recommendations.  4/14 Patient up to chair with assistance today.  CT brain remains same as prior 2 weeks ago.  Still pending ortho consultation.  4/15 Patient to be seen by ortho today per Dr Porter.  D/w dietary - patient with weight loss and poor po intake, she needs to be a 1:1 feed patient and if she fails this, will likely need tube feeding to get adequate nutrition.    4/23 Patient up to chair with sister much of the morning and had uncontrolled pain following this.  One time dose of oxycodone given with great relief but patient sleeping since that dose.  Sister is concerned about her breathing as she is only taking small breaths at times, given need for re-imaging of abdomen/pelvis, will add on chest for further evaluation.  Lungs sounded clear on ascultation.  Brother also up visiting with his wife.  Ortho did consult, recommended hardware needs to remain in place for at least 6 months or non-union likely to occur, continue with antibiotics.    Consultants/Specialty  ID - Ashish Rogers - s/o    Code Status  full    Disposition  SNF/LTAC in [place]    Review of Systems  Review of Systems   Constitutional: Negative for chills and fever.   HENT: Negative for congestion and sore throat.    Eyes: Negative for blurred vision and photophobia.   Respiratory: Negative for cough and  shortness of breath.    Cardiovascular: Negative for chest pain, claudication and leg swelling.   Gastrointestinal: Negative for abdominal pain, constipation, diarrhea, heartburn, nausea and vomiting.   Genitourinary: Negative for dysuria and hematuria.   Musculoskeletal: Positive for back pain (lumbar/sacral area ) and myalgias (right hip and buttock pain.). Negative for joint pain.   Skin: Negative for itching and rash.   Neurological: Negative for dizziness, sensory change, speech change, weakness and headaches.   Psychiatric/Behavioral: Negative for depression. The patient is not nervous/anxious and does not have insomnia.         Physical Exam  Temp:  [36.2 °C (97.1 °F)-36.9 °C (98.4 °F)] 36.2 °C (97.1 °F)  Pulse:  [71-90] 90  Resp:  [18-20] 18  BP: (115-138)/(63-70) 130/70  SpO2:  [92 %-93 %] 92 %    Physical Exam   Constitutional: She is oriented to person, place, and time. She appears well-developed and well-nourished. No distress.   HENT:   Head: Normocephalic and atraumatic.   Eyes: Conjunctivae are normal. No scleral icterus.   Neck: Neck supple. No JVD present.   Cardiovascular: Normal rate, regular rhythm and normal heart sounds.  Exam reveals no gallop and no friction rub.    No murmur heard.  Pulmonary/Chest: Effort normal and breath sounds normal. No respiratory distress. She exhibits no tenderness.   Abdominal: Soft. Bowel sounds are normal. She exhibits no distension. There is no guarding.   Musculoskeletal: She exhibits no edema or tenderness.          Lymphadenopathy:     She has no cervical adenopathy.   Neurological: She is alert and oriented to person, place, and time. No cranial nerve deficit.   Mentation labile     Skin: Skin is warm and dry. She is not diaphoretic. No erythema. No pallor.   Psychiatric: She has a normal mood and affect. Her behavior is normal.   Nursing note and vitals reviewed.      Fluids    Intake/Output Summary (Last 24 hours) at 04/23/19 4459  Last data filed at  04/22/19 1753   Gross per 24 hour   Intake              946 ml   Output                0 ml   Net              946 ml       Laboratory  Recent Labs      04/21/19 0222  04/22/19   0040  04/23/19   0026   WBC  4.3*  4.8  4.3*   RBC  4.46  4.61  4.36   HEMOGLOBIN  11.2*  11.6*  10.8*   HEMATOCRIT  36.0*  36.8*  37.0   MCV  80.7*  79.8*  84.9   MCH  25.1*  25.2*  24.8*   MCHC  31.1*  31.5*  29.2*   RDW  52.4*  52.2*  56.5*   PLATELETCT  129*  144*  149*   MPV  10.5  10.7  11.3     Recent Labs      04/21/19 0222 04/22/19   0040  04/23/19   0026   SODIUM  136  139  133*   POTASSIUM  4.6  3.8  3.3*   CHLORIDE  109  108  107   CO2  23  23  22   GLUCOSE  102*  90  113*   BUN  24*  20  21   CREATININE  0.72  0.63  0.66   CALCIUM  11.2*  11.6*  11.2*                   Imaging  DX-CHEST-LIMITED (1 VIEW)   Final Result      No acute cardiac or pulmonary abnormalities are identified. Lung volumes are low.      CT-ABDOMEN-PELVIS WITH   Final Result         1.  Enhancing fluid collection in the right gluteal muscle, appearance compatible with small abscess.   2.  Loculated enhancing fluid collection in the right iliac is muscle, corresponding with site of prior drain, appearance compatible with reaccumulation of abscess.   3.  Fluid-filled distention of small bowel suggests ileus   4.  Atherosclerosis   5.  Trace pericardial effusion      CT-CTA CHEST PULMONARY ARTERY W/ RECONS   Final Result         1.  No large central pulmonary embolus is appreciated, evaluation of the subsegmental branches is essentially nondiagnostic due to motion artifacts. Additional imaging would be required for definitive exclusion of small distal pulmonary emboli.   2.  Trace pericardial effusion   3.  Hepatomegaly   4.  Atherosclerosis.   5.  Right pulmonary nodules, the largest is a 7.7 mm pulmonary nodule, see nodule follow-up recommendations below.      Low Risk: CT at 3-6 months, then consider CT at 18-24 months      High Risk: CT at 3-6 months,  then at 18-24 months      Comments: Use most suspicious nodule as guide to management. Follow-up intervals may vary according to size and risk.      Low Risk - Minimal or absent history of smoking and of other known risk factors.      High Risk - History of smoking or of other known risk factors.      Note: These recommendations do not apply to lung cancer screening, patients with immunosuppression, or patients with known primary cancer.      Fleischner Society 2017 Guidelines for Management of Incidentally Detected Pulmonary Nodules in Adults         DX-CHEST-PORTABLE (1 VIEW)   Final Result         1.  Mild pulmonary edema and/or infiltrate   2.  Cardiomegaly      CT-HEAD WITH & W/O   Final Result      1.  No change in scattered hyperenhancing lesions throughout the brain, cerebellum and visualized brainstem. Some of them demonstrate mild associated edema, similar to prior study.   2.  No CT evidence of infarct or hemorrhage.      CT-NEEDLE BX-MUSCLE RIGHT   Final Result   Addendum 1 of 1   Addendum:   The biopsy/drainage was done utilizing low dose optimization CT techniques    including auto modulation for  imaging and low mA CT/fluoroscopy mode    for the procedure.      Final      CT-guided aspiration of pus like material in the right gluteal muscle lesion.      CT-ABDOMEN-PELVIS WITH   Final Result      1.  Resolution of right iliacus abscess with no associated fluid adjacent to the drainage catheter      2.  Interval removal of right gluteal drainage catheter      3.  Surgical screw traversing both sacroiliac joint with associated pathologic fracture of the right ilium and right medial sacrum      4.  Hepatomegaly      CT-DRAIN-HEMATOMA - SEROMA   Final Result      1.  CT-GUIDED RETROPERITONEAL (EXTRAPERITONEAL) RIGHT PELVIC ABSCESS DRAINAGE AT THE RIGHT ILIOPSOAS. ORDERS WERE WRITTEN FOR ONCE DAILY IRRIGATION OF THE CATHETER WITH 5 ML STERILE SALINE.      2.  THE CURRENT PLAN IS TO MONITOR DRAINAGE  OUTPUT AND OBTAIN A FOLLOWUP CT SCAN IN 5-7 DAYS IF CLINICALLY INDICATED.      CT-ABDOMEN-PELVIS WITH   Final Result      1.  Rim-enhancing fluid collection in the right iliopsoas muscle may have increased in size slightly from the prior exam.      2.  Pigtail catheter in the right gluteus medius muscle. No residual fluid collection is appreciated.      3.  Pathologic fracture of the right ilium. Status post right SI joint fusion.      4.  Atherosclerosis.      5.  Cholelithiasis.      CT-HEAD WITH & W/O   Final Result      1.  Interval decrease in size and level of enhancement of multiple small punctate foci in both cerebral hemispheres and in the midbrain consistent with improving septic emboli.      2.  No hemorrhage or mass effect.      CT-DRAIN-RETROPERITONEAL   Final Result      1.  CT GUIDED CATHETER DRAINAGE OF RIGHT GLUTEAL ABSCESS.   2.  THE CURRENT PLAN IS TO OBTAIN A FOLLOW-UP CT SCAN IN 5-7 DAYS IF INDICATED. ORDERS WERE WRITTEN FOR ONCE DAILY IRRIGATION OF THE CATHETER WITH 5 ML STERILE SALINE FOR 3 DAYS.   3. THE ILIOPSOAS COLLECTION WAS NOT AMENABLE TO CATHETER DRAINAGE AS INTERVENING BOWEL AND/OR BONY STRUCTURES OBSTRUCT A PATHWAY TO THIS COLLECTION AT THIS TIME.      MR-LUMBAR SPINE-WITH & W/O   Final Result      1.  There are multifocal enhancing fluid collections in the right iliopsoas muscles and gluteus muscles adjacent to the right ilium. Right iliopsoas fluid collection measures an approximately 3.9 x 2.9 cm. The right gluteal fluid collection measures an    approximately 3.5 x 2.8 cm in size. The differential diagnosis includes postsurgical seroma and abscess.   2.  Post operative and degenerative changes as described above.      MR-PELVIS-WITH & W/O AND SEQUENCES   Final Result      1.  Surgical screw traverses both sacroiliac joint across presumed pathologic fracture of the right sacrum and the hardware obscures the adjacent tissues.      2.  No other evidence for bony metastatic disease       3.  Right gluteal musculature rim-enhancing fluid collection measuring 3.4 x 2.8 cm. This could be a postoperative seroma versus abscess      4.  osteoarthritis of both hip joint      5.  Prior hysterectomy      IR-MIDLINE CATHETER INSERTION WO GUIDANCE > AGE 3   Final Result                  Ultrasound-guided midline placement performed by qualified nursing staff    as above.          CT-HEAD WITH   Final Result      Multiple subcentimeter too numerous to count enhancing masses scattered throughout the supratentorial and infratentorial brain. These demonstrate some surrounding vasogenic edema and most likely represent multifocal metastatic lesions. Other    considerations would include multifocal abscesses or septic emboli.      EC-CHAS W/O CONT   Final Result      EC-CHAS W/O CONT   Final Result      EC-ECHOCARDIOGRAM COMPLETE W/O CONT   Final Result      OUTSIDE IMAGES-CT CHEST/ABDOMEN/PELVIS   Final Result      OUTSIDE IMAGES-DX CHEST   Final Result      [id number] FILM X-RAY   Final Result      OUTSIDE IMAGES-MR BRAIN   Final Result      OUTSIDE IMAGES-MR BRAIN   Final Result      MR-BRAIN-WITH & W/O    (Results Pending)   CT-CHEST,ABDOMEN,PELVIS WITH    (Results Pending)        Assessment/Plan  * Lesion of brain   Assessment & Plan    IR Bx of psoas muscle ordered - was abscess  Continued treatment of infection  TTE and CHAS negative for vegetations.  MRI brain to repeat, CT abdomen/pelvis to repeat  Palliative consulted.  Vanco/merrem per ID           Encephalopathy   Assessment & Plan    Waxes and wanes.  Likely secondary to multiple brain lesions.  Avoid benzodiazepines and/or anticholinergic medications.  Avoid sedatives or hypnotics.  At baseline for now.       Mass of iliopsoas muscle group   Assessment & Plan    Return of fluid collection, repeat drainage in IR with cultures, drain placed 3/29 - 10ml purulent material drained and sent for culture - no growth a/o 3/30  Was on linezolid and  cefepime and now on vanco/merrem - end date to be > 6 weeks of treatment as now clinical resolution needed  ID following   CHAS negative for endocarditis.  Patient has sacral screws, Orthopedic surgery recommended hardware to stay for at least 6 months to promote adequate healing and to continue with antibiotics for now.  4/20: Patient did not qualify for IR aspiration and drain placement for the above-mentioned abscesses as they were too small.  ID recommending repeat CT abdomen/pelvis and MRI brain for further evaluation           Pancytopenia (HCC)   Assessment & Plan    Could be due to toxic effects of infection on bone marrow.  Afebrile.  ANC within normal limits.  Continue to monitor.  Gradually improving.  Resolved although normal white blood cell count and normal ANC.  Mild thrombocytopenia.       Hyponatremia- (present on admission)   Assessment & Plan    Mildly low - doubt contribution to mentation         RLS (restless legs syndrome)- (present on admission)   Assessment & Plan    Pramipexole       Diabetes mellitus type 2 in obese (HCC)- (present on admission)   Assessment & Plan    Well controlled   Insulin sliding scale with hypoglycemia protocol.  Continue to monitor       History of breast cancer- (present on admission)   Assessment & Plan    Status post mastectomy.  Repeat CT showed decrease in size with antibiotics wishes consistent with abscesses rather than metastasis.  Continue IV antibiotis  Appreciate infectious disease recommendations.       COPD (chronic obstructive pulmonary disease) (HCC)- (present on admission)   Assessment & Plan    Currently maintaining saturation on room air.  Respiratory therapy per protocol.     Hypercalcemia- (present on admission)   Assessment & Plan    Mild   Continue with Sensipar.  Monitor and adjust Sensipar dose accordingly.       Sinus tachycardia- (present on admission)   Assessment & Plan    Likely due to combination of infection and volume  loss.  Echocardiogram with normal ejection fraction and no significant valvular abnormalities.  TSH is slightly elevated 7.15.  Ordered free T4  Tachycardia has been improving.     Urinary tract infection   Assessment & Plan    Culture remain no growth.       Fungal infection of the groin   Assessment & Plan    Resolved         History of deep vein thrombosis- (present on admission)   Assessment & Plan    History of deep vein thrombosis   Was on Rivaroxaban as outpatient.     Continues to be on hold as interventions often with drain placement/removal  Start prophylactic lovenox once no further interventions needed.           Essential hypertension- (present on admission)   Assessment & Plan    Restarted on losartan and furosemide with hold parameters.  Continue to monitor     Fever   Assessment & Plan    PRN Tylenol.  Continue monitor     GERD (gastroesophageal reflux disease)- (present on admission)   Assessment & Plan    Omeprazole     Chronic pain- (present on admission)   Assessment & Plan     Multifactorial  Likely secondary to abscess, fracture, bed bound status  Pain control, avoid narcotics given severe impact on patient's mentation.              VTE prophylaxis: scds

## 2019-04-24 NOTE — CARE PLAN
Problem: Skin Integrity  Goal: Risk for impaired skin integrity will decrease  Outcome: PROGRESSING AS EXPECTED  Pt. Turned as tolerated, waffle cushion in place, barrier wipes in use    Problem: Psychosocial Needs:  Goal: Level of anxiety will decrease  Outcome: PROGRESSING AS EXPECTED  Pt. Calling out less than baseline, haldol indicated x1 this shift. Pt. Responds well to emotional support.

## 2019-04-24 NOTE — PROGRESS NOTES
Pharmacy Kinetics 68 y.o. female on vancomycin day # 31 2019    Currently on Vancomycin 600 mg iv q24hr (2100)     Indication for Treatment: possible brain abscesses/ iliopsoas abscess     Pertinent history per medical record: Admitted on 3/8/2019 for iliopsoas mass and multiple brain lesions. There is concern that this is infectious. All cultures are negative to date, but were all drawn while on antibiotics. CHAS was negative. Patient has a history of breast cancer and DM. ID is following. Plan for completion of abx (6 weeks) in house prior to transfer to [facility] in [place].      Other antibiotics: Meropenem 2g IV q8h     Allergies: Patient has no known allergies.      List concerns for renal function: age, and prolonged vancomycin duration, elevated BUN/SCr ratio, low albumin     Pertinent cultures to date:   19: wound - right gluteal mass: negative   19: blood - peripheral x 2: negative   19: wound - right buttock: negative   19: blood - peripheral x 2: negative   19: wound - retroperitoneal drain aspirate: negative   19:PBCx2:NGTD     MRSA nares swab if pneumonia is a concern (ordered/positive/negative/n-a): n/a    Recent Labs      19   0040  19   0026  19   0026   WBC  4.8  4.3*  5.0   NEUTSPOLYS  52.60  66.10  60.50     Recent Labs      19   0040  19   0026  19   0026   BUN  20  21  18   CREATININE  0.63  0.66  0.66   ALBUMIN  2.7*  2.5*  2.4*     Recent Labs      19   1946   [facility]  24.4*   No intake or output data in the 24 hours ending 19 1607   /76   Pulse 90   Temp 36.7 °C (98 °F) (Temporal)   Resp 19   Ht 1.524 m (5')   Wt 64.6 kg (142 lb 6.7 oz)   SpO2 94%  Temp (24hrs), Av.4 °C (97.6 °F), Min:36.2 °C (97.1 °F), Max:36.7 °C (98 °F)      A/P   1. Vancomycin dose change: decrease to 500mg q24h  2. Next vancomycin level: ~2 days or sooner if indicated  3. Goal trough: 16-20mcg/mL  4. Comments: Renal  function appears stable with adequate voids. Discussed duration of therapy with Dr. Keller, at current time endpoint is clinical per ID. Vancomycin trough above therapeutic goal range. CT with contrast 4/23/19, which could have an effect on renal function and vancomycin clearance.  Decrease dose as above. Pharmacy will monitor/adjust as needed per protocol.      ZULEIKA Bojorquez Pharm.D.

## 2019-04-24 NOTE — PROGRESS NOTES
2 RN consent signed for contrast, pt verbalized consent but was unable to hold pen to sign consent.

## 2019-04-24 NOTE — CARE PLAN
Problem: Safety  Goal: Will remain free from falls    Intervention: Implement fall precautions  Fall precautions in place. Pt remained safe & free from fall/injury throughout shift.       Problem: Psychosocial Needs:  Goal: Level of anxiety will decrease  Outcome: PROGRESSING AS EXPECTED  Family visiting for majority of day. Pt able to go via wheelchair with family to [facility].     Problem: Pain Management  Goal: Pain level will decrease to patient's comfort goal    Intervention: Follow pain managment plan developed in collaboration with patient and Interdisciplinary Team  Pt medicated for pain.   Intervention: Educate and implement non-pharmacologic comfort measures. Examples: relaxation, distration, play therapy, activity therapy, massage, etc.  Ice packs used for left hip pain.

## 2019-04-24 NOTE — PROGRESS NOTES
/79   Pulse 90   Temp 36.6 °C (97.8 °F) (Temporal)   Resp 20   Ht 1.524 m (5')   Wt 64.6 kg (142 lb 6.7 oz)   SpO2 93%   Breastfeeding? No   BMI 27.81 kg/m²     Pt is AOX2. Disoriented to time and situation. Denies SOB, chest pain, n/v, pain. Pt went down for CT scan this evening. Midline in left upper arm is patent with positive blood return. Call light within reach. Bed is locked in lowest position with bed alarm on. POC discussed.

## 2019-04-24 NOTE — PROGRESS NOTES
Infectious Disease Progress Note    Author: Ashely Hinojosa M.D. Date & Time of service: 4/24/2019  8:53 AM    Chief Complaint:  Multiple brain lesions  Iliopsoas lesion    Interval History:  68-year-old female with history of diabetes and breast cancer, admitted 3/8/2019 with abdominal pain, iliopsoas lesion, and an abnormal MRI with multiple brain lesions.  4/13/2019 patient is somewhat confused.  No fevers.  No new issues overnight.  4/14/2019 no fevers.  Somewhat confused.  WBC is 4.  4/15/2019 no fevers.  Complains of back pain and joint pain.  WBCs 5.5  4/16/2019 patient was transferred to telemetry overnight.  Much more lethargic.  4/17/2019-no fevers.  Wants to know the plan.  More awake and responsive.  4/18/2019 patient has no fevers.  She is drenching and sweats.  Complains of hip pain.  4/19/2019-low-grade fevers.  No new issues overnight.  Wants to walk.  Complains of pain.  4/20 afebrile WBC 3.9 patient is awake and answering questions appropriately.  She does endorse some pain on the right hip.  CT revealed enhancing fluid collection in the right gluteal muscle consistent with a small abscess and a loculated enhancing fluid collection in the right iliac muscle.  Plan for IR drainage today  4/21 afebrile WBC 4.3 patient transferred to oncology.  She is resting comfortably without any complaint.  Drain was not placed yesterday  4/22 afebrile WBC 4.8 patient is drowsy as she just woke up.  She complains of a cough when she drinks water.  Fluid collections too small for drainage by IR   4/23 afebrile WBC 4.3 patient complaining of back pain.  Denies any hip pain today  4/24 AF WBC 4 complaining of R buttock and back pain. No issues reported overnight per nursing staff. Pt has history of sacral fracture s/p transsacral screw fixation on 12/17/18    labs Reviewed    Review of Systems:  Review of Systems   Constitutional: Positive for malaise/fatigue. Negative for diaphoresis and fever.   HENT: Negative  for hearing loss.    Respiratory: Negative for cough and shortness of breath.    Cardiovascular: Negative for chest pain and leg swelling.   Gastrointestinal: Negative for nausea and vomiting.   Genitourinary: Negative for dysuria.   Musculoskeletal: Positive for back pain and joint pain. Negative for myalgias.        Right hip   Neurological: Negative for sensory change and speech change.   All other systems reviewed and are negative.      Hemodynamics:  Temp (24hrs), Av.4 °C (97.6 °F), Min:36.2 °C (97.1 °F), Max:36.7 °C (98 °F)  Temperature: 36.7 °C (98 °F)  Pulse  Av.6  Min: 59  Max: 153  Blood Pressure : 148/76       Physical Exam:  Physical Exam   Constitutional: She appears well-developed and well-nourished. No distress.   Elderly   HENT:   Head: Normocephalic and atraumatic.   Eyes: Pupils are equal, round, and reactive to light. EOM are normal. Right eye exhibits no discharge. Left eye exhibits no discharge.   Neck: Neck supple.   Cardiovascular: Normal rate and intact distal pulses.    Murmur heard.  Pulmonary/Chest: Effort normal and breath sounds normal. No respiratory distress. She has no wheezes.   Abdominal: Soft. Bowel sounds are normal. She exhibits no distension. There is no tenderness.   Musculoskeletal: She exhibits no edema, tenderness or deformity.   Right upper extremity PICC line   Lymphadenopathy:     She has no cervical adenopathy.   Neurological: She is alert.   Moves all extremities    Muffled speech       Skin: Skin is warm. No rash noted.   Nursing note and vitals reviewed.    Meds:    Current Facility-Administered Medications:   •  ibuprofen  •  MD Alert...Vancomycin per Pharmacy  •  vancomycin  •  hydrALAZINE  •  cinacalcet  •  amLODIPine  •  acetaminophen  •  meropenem  •  haloperidol lactate  •  QUEtiapine  •  gabapentin  •  labetalol  •  loperamide  •  losartan  •  Notify provider if pain remains uncontrolled **AND** Use the numeric rating scale (NRS-11) on regular floors  and Critical-Care Pain Observation Tool (CPOT) on ICUs/Trauma to assess pain **AND** Pulse Ox (Oximetry) **AND** Pharmacy Consult Request **AND** If patient difficult to arouse and/or has respiratory depression, stop any opiates that are currently infusing and call a Rapid Response. **AND** [DISCONTINUED] oxyCODONE immediate-release **AND** [DISCONTINUED] oxyCODONE immediate-release **AND** [DISCONTINUED] morphine injection  •  omeprazole  •  ondansetron  •  ondansetron  •  pramipexole  •  Respiratory Care per Protocol    Labs:  Recent Labs      04/22/19   0040  04/23/19   0026  04/24/19   0026   WBC  4.8  4.3*  5.0   RBC  4.61  4.36  4.24   HEMOGLOBIN  11.6*  10.8*  10.5*   HEMATOCRIT  36.8*  37.0  34.8*   MCV  79.8*  84.9  82.1   MCH  25.2*  24.8*  24.8*   RDW  52.2*  56.5*  54.1*   PLATELETCT  144*  149*  143*   MPV  10.7  11.3  10.3   NEUTSPOLYS  52.60  66.10  60.50   LYMPHOCYTES  16.20*  20.40*  18.50*   MONOCYTES  17.60*  5.90  3.40   EOSINOPHILS  12.20*  5.90  16.80*   BASOPHILS  0.60  1.70  0.80   RBCMORPHOLO   --   Present  Normal     Recent Labs      04/22/19   0040  04/23/19   0026  04/24/19   0026   SODIUM  139  133*  134*   POTASSIUM  3.8  3.3*  3.4*   CHLORIDE  108  107  102   CO2  23  22  25   GLUCOSE  90  113*  76   BUN  20  21  18     Recent Labs      04/22/19   0040  04/23/19   0026  04/24/19   0026   ALBUMIN  2.7*  2.5*  2.4*   TBILIRUBIN  0.5  0.5  0.5   ALKPHOSPHAT  228*  206*  239*   TOTPROTEIN  6.5  6.1  6.4   ALTSGPT  16  15  18   ASTSGOT  39  39  46*   CREATININE  0.63  0.66  0.66       Imaging:  CT abdomen and pelvis on 4/19/19  1.  Enhancing fluid collection in the right gluteal muscle, appearance compatible with small abscess.  2.  Loculated enhancing fluid collection in the right iliac is muscle, corresponding with site of prior drain, appearance compatible with reaccumulation of abscess.  3.  Fluid-filled distention of small bowel suggests ileus  4.  Atherosclerosis  5.  Trace  "pericardial effusion      Micro:  Results     Procedure Component Value Units Date/Time    BLOOD CULTURE [[id number]] Collected:  04/18/19 1536    Order Status:  Completed Specimen:  Blood from Peripheral Updated:  04/23/19 1700     Significant Indicator NEG     Source BLD     Site PERIPHERAL     Blood Culture No growth after 5 days of incubation.    Narrative:       Per Hospital Policy: Only change Specimen Src: to \"Line\" if  specified by physician order.    BLOOD CULTURE [[id number]] Collected:  04/18/19 1536    Order Status:  Completed Specimen:  Blood from Peripheral Updated:  04/23/19 1700     Significant Indicator NEG     Source BLD     Site PERIPHERAL     Blood Culture No growth after 5 days of incubation.    Narrative:       Per Hospital Policy: Only change Specimen Src: to \"Line\" if  specified by physician order.    URINALYSIS [[id number]]     Order Status:  No result Specimen:  Urine from Urine, Clean Catch           Assessment:  Active Hospital Problems    Diagnosis   • *Lesion of brain [G93.9]   • Mass of iliopsoas muscle group [M62.89]   • Encephalopathy [G93.40]   • History of breast cancer [Z85.3]   • Diabetes mellitus type 2 in obese (HCC) [E11.69, E66.9]       Plan:  Multiple brain lesions-likely abscesses versus metastatic lesions  Confusion improving and intermittent  MRI with scattered small lesions incl aaron, too small to biopsy per neurosurgery  TTE neg; CHAS neg  Bcxs neg  Repeat CT scan on 3/30/2019 shows decrease in the size of the brain lesions, supporting that these are abscesses  Repeat MRI brain-ordered  Antibiotics per below  Duration of antibiotics pending results of repeat MRI    Iliopsoas abscess, on treatment.  Persistent per imaging on 4/23  CT + 2.6 cm lesion in the right iliopsoas region  S/p aspiration +WBCs-cultures negative to date  Bcxs neg  D/c cefepime 03/26 secondary to confusion  Tolerating Meropenem started 03/26  MRI w/ contrast lumbar, pelvis 03/27 - Iliopsoas and gluteal " fluid collections noted.    Drain placed 3/29/2019.  Cultures negative  Another drain was placed on 4/4/2019 and it shows purulent fluid  Repeat CT from 4/8 with resolution of the fluid collections after drainage  Repeat CT on 4/19 revealed right gluteal muscle abscess in addition to recurrent abscess in the right iliac muscle.  Fluid collections too small for drainage by IR  Continue meropenem and vancomycin  Monitor renal function and vanco trough 24.4 on 4/23  Last vanco trough   Duration of antibiotics clinical  Repeat CT 4/23 - R gluteal fluid collection unchanged; R iliacus fluid collection increased in size now measuring 2.7 cm  Recommend IR drainage if feasible    Type 2 diabetes mellitus  Hemoglobin A1c 6.3  Keep BS under 150 to help control current infection    Altered mental status, improved overall  Appears to be waxing and waning  Multifactorial, likely large contribution from her multiple brain abscesses, monitor  The CT scan done on 4/13/2019 shows persistence of the lesions    H/o breast cancer    I have performed a physical exam and reviewed and updated ROS and plan today 4/24/2019.  In review of yesterday's note 4/23/2019, there are no changes except as documented above.    Disposition: Family and primary care team working on LT[facility] in [place]    Discussed with Dr. Keller and RN

## 2019-04-24 NOTE — CARE PLAN
Problem: Communication  Goal: The ability to communicate needs accurately and effectively will improve  Outcome: PROGRESSING SLOWER THAN EXPECTED  Pt does not call appropriately for assistance. Hourly rounding in place.     Problem: Infection  Goal: Will remain free from infection  Outcome: PROGRESSING AS EXPECTED  Assess pt for signs and symptoms of infection.     Problem: Skin Integrity  Goal: Risk for impaired skin integrity will decrease  Outcome: PROGRESSING AS EXPECTED

## 2019-04-25 LAB
ALBUMIN SERPL BCP-MCNC: 2.5 G/DL (ref 3.2–4.9)
ALBUMIN/GLOB SERPL: 0.6 G/DL
ALP SERPL-CCNC: 230 U/L (ref 30–99)
ALT SERPL-CCNC: 19 U/L (ref 2–50)
ANION GAP SERPL CALC-SCNC: 7 MMOL/L (ref 0–11.9)
ANISOCYTOSIS BLD QL SMEAR: ABNORMAL
AST SERPL-CCNC: 54 U/L (ref 12–45)
BASOPHILS # BLD AUTO: 0.9 % (ref 0–1.8)
BASOPHILS # BLD: 0.05 K/UL (ref 0–0.12)
BILIRUB SERPL-MCNC: 0.6 MG/DL (ref 0.1–1.5)
BUN SERPL-MCNC: 18 MG/DL (ref 8–22)
CALCIUM SERPL-MCNC: 10.1 MG/DL (ref 8.5–10.5)
CHLORIDE SERPL-SCNC: 105 MMOL/L (ref 96–112)
CO2 SERPL-SCNC: 26 MMOL/L (ref 20–33)
CREAT SERPL-MCNC: 0.52 MG/DL (ref 0.5–1.4)
EOSINOPHIL # BLD AUTO: 0.74 K/UL (ref 0–0.51)
EOSINOPHIL NFR BLD: 14 % (ref 0–6.9)
ERYTHROCYTE [DISTWIDTH] IN BLOOD BY AUTOMATED COUNT: 53.2 FL (ref 35.9–50)
GLOBULIN SER CALC-MCNC: 4.2 G/DL (ref 1.9–3.5)
GLUCOSE SERPL-MCNC: 77 MG/DL (ref 65–99)
HCT VFR BLD AUTO: 36 % (ref 37–47)
HGB BLD-MCNC: 11.2 G/DL (ref 12–16)
LYMPHOCYTES # BLD AUTO: 0.65 K/UL (ref 1–4.8)
LYMPHOCYTES NFR BLD: 12.3 % (ref 22–41)
MANUAL DIFF BLD: NORMAL
MCH RBC QN AUTO: 25.4 PG (ref 27–33)
MCHC RBC AUTO-ENTMCNC: 31.1 G/DL (ref 33.6–35)
MCV RBC AUTO: 81.6 FL (ref 81.4–97.8)
MICROCYTES BLD QL SMEAR: ABNORMAL
MONOCYTES # BLD AUTO: 0.32 K/UL (ref 0–0.85)
MONOCYTES NFR BLD AUTO: 6.1 % (ref 0–13.4)
MORPHOLOGY BLD-IMP: NORMAL
NEUTROPHILS # BLD AUTO: 3.49 K/UL (ref 2–7.15)
NEUTROPHILS NFR BLD: 65.8 % (ref 44–72)
NRBC # BLD AUTO: 0 K/UL
NRBC BLD-RTO: 0 /100 WBC
OVALOCYTES BLD QL SMEAR: NORMAL
PLATELET # BLD AUTO: 135 K/UL (ref 164–446)
PLATELET BLD QL SMEAR: NORMAL
PMV BLD AUTO: 11.2 FL (ref 9–12.9)
POIKILOCYTOSIS BLD QL SMEAR: NORMAL
POLYCHROMASIA BLD QL SMEAR: NORMAL
POTASSIUM SERPL-SCNC: 3 MMOL/L (ref 3.6–5.5)
PROT SERPL-MCNC: 6.7 G/DL (ref 6–8.2)
RBC # BLD AUTO: 4.41 M/UL (ref 4.2–5.4)
RBC BLD AUTO: PRESENT
SMUDGE CELLS BLD QL SMEAR: NORMAL
SODIUM SERPL-SCNC: 138 MMOL/L (ref 135–145)
WBC # BLD AUTO: 5.3 K/UL (ref 4.8–10.8)

## 2019-04-25 PROCEDURE — 99232 SBSQ HOSP IP/OBS MODERATE 35: CPT | Performed by: INTERNAL MEDICINE

## 2019-04-25 PROCEDURE — A9270 NON-COVERED ITEM OR SERVICE: HCPCS | Performed by: INTERNAL MEDICINE

## 2019-04-25 PROCEDURE — 700102 HCHG RX REV CODE 250 W/ 637 OVERRIDE(OP): Performed by: FAMILY MEDICINE

## 2019-04-25 PROCEDURE — 80053 COMPREHEN METABOLIC PANEL: CPT

## 2019-04-25 PROCEDURE — 770009 HCHG ROOM/CARE - ONCOLOGY SEMI PRI*

## 2019-04-25 PROCEDURE — 99233 SBSQ HOSP IP/OBS HIGH 50: CPT | Performed by: INTERNAL MEDICINE

## 2019-04-25 PROCEDURE — 700105 HCHG RX REV CODE 258: Performed by: INTERNAL MEDICINE

## 2019-04-25 PROCEDURE — 700111 HCHG RX REV CODE 636 W/ 250 OVERRIDE (IP): Performed by: FAMILY MEDICINE

## 2019-04-25 PROCEDURE — 85027 COMPLETE CBC AUTOMATED: CPT

## 2019-04-25 PROCEDURE — 700111 HCHG RX REV CODE 636 W/ 250 OVERRIDE (IP): Performed by: INTERNAL MEDICINE

## 2019-04-25 PROCEDURE — 700102 HCHG RX REV CODE 250 W/ 637 OVERRIDE(OP): Performed by: INTERNAL MEDICINE

## 2019-04-25 PROCEDURE — 97530 THERAPEUTIC ACTIVITIES: CPT

## 2019-04-25 PROCEDURE — A9270 NON-COVERED ITEM OR SERVICE: HCPCS | Performed by: FAMILY MEDICINE

## 2019-04-25 PROCEDURE — 36415 COLL VENOUS BLD VENIPUNCTURE: CPT

## 2019-04-25 PROCEDURE — 97535 SELF CARE MNGMENT TRAINING: CPT

## 2019-04-25 PROCEDURE — 85007 BL SMEAR W/DIFF WBC COUNT: CPT

## 2019-04-25 RX ORDER — QUETIAPINE FUMARATE 25 MG/1
37.5 TABLET, FILM COATED ORAL EVERY EVENING
Status: DISCONTINUED | OUTPATIENT
Start: 2019-04-25 | End: 2019-04-26

## 2019-04-25 RX ORDER — POTASSIUM CHLORIDE 20 MEQ/1
40 TABLET, EXTENDED RELEASE ORAL DAILY
Status: DISCONTINUED | OUTPATIENT
Start: 2019-04-25 | End: 2019-04-28

## 2019-04-25 RX ADMIN — GABAPENTIN 300 MG: 300 CAPSULE ORAL at 04:35

## 2019-04-25 RX ADMIN — AMLODIPINE BESYLATE 10 MG: 10 TABLET ORAL at 04:35

## 2019-04-25 RX ADMIN — IBUPROFEN 600 MG: 400 TABLET, FILM COATED ORAL at 13:36

## 2019-04-25 RX ADMIN — MEROPENEM 2 G: 1 INJECTION, POWDER, FOR SOLUTION INTRAVENOUS at 01:10

## 2019-04-25 RX ADMIN — IBUPROFEN 600 MG: 400 TABLET, FILM COATED ORAL at 04:34

## 2019-04-25 RX ADMIN — POTASSIUM CHLORIDE 40 MEQ: 1500 TABLET, EXTENDED RELEASE ORAL at 08:15

## 2019-04-25 RX ADMIN — MEROPENEM 2 G: 1 INJECTION, POWDER, FOR SOLUTION INTRAVENOUS at 21:04

## 2019-04-25 RX ADMIN — OMEPRAZOLE 20 MG: 20 CAPSULE, DELAYED RELEASE ORAL at 04:35

## 2019-04-25 RX ADMIN — GABAPENTIN 300 MG: 300 CAPSULE ORAL at 17:26

## 2019-04-25 RX ADMIN — LOSARTAN POTASSIUM 50 MG: 50 TABLET ORAL at 04:35

## 2019-04-25 RX ADMIN — MEROPENEM 2 G: 1 INJECTION, POWDER, FOR SOLUTION INTRAVENOUS at 11:35

## 2019-04-25 RX ADMIN — CINACALCET HYDROCHLORIDE 60 MG: 30 TABLET, FILM COATED ORAL at 04:34

## 2019-04-25 RX ADMIN — VANCOMYCIN HYDROCHLORIDE 500 MG: 100 INJECTION, POWDER, LYOPHILIZED, FOR SOLUTION INTRAVENOUS at 21:15

## 2019-04-25 RX ADMIN — ACETAMINOPHEN 650 MG: 325 TABLET, FILM COATED ORAL at 11:42

## 2019-04-25 RX ADMIN — QUETIAPINE FUMARATE 37.5 MG: 25 TABLET ORAL at 17:26

## 2019-04-25 RX ADMIN — HYDRALAZINE HYDROCHLORIDE 20 MG: 20 INJECTION INTRAMUSCULAR; INTRAVENOUS at 04:34

## 2019-04-25 ASSESSMENT — ENCOUNTER SYMPTOMS
MYALGIAS: 1
PHOTOPHOBIA: 0
CLAUDICATION: 0
FEVER: 0
HEARTBURN: 0
DIAPHORESIS: 0
DIARRHEA: 0
INSOMNIA: 0
CONSTIPATION: 0
SORE THROAT: 0
MYALGIAS: 0
CHILLS: 0
ABDOMINAL PAIN: 0
WEAKNESS: 0
DIZZINESS: 0
BLURRED VISION: 0
NAUSEA: 0
SHORTNESS OF BREATH: 0
SPEECH CHANGE: 0
VOMITING: 0
SENSORY CHANGE: 0
HEADACHES: 0
COUGH: 0
DEPRESSION: 0
BACK PAIN: 1
NERVOUS/ANXIOUS: 0

## 2019-04-25 ASSESSMENT — COGNITIVE AND FUNCTIONAL STATUS - GENERAL
EATING MEALS: TOTAL
PERSONAL GROOMING: A LOT
TOILETING: TOTAL
HELP NEEDED FOR BATHING: A LOT
DRESSING REGULAR LOWER BODY CLOTHING: A LOT
SUGGESTED CMS G CODE MODIFIER DAILY ACTIVITY: CL
DAILY ACTIVITIY SCORE: 10
DRESSING REGULAR UPPER BODY CLOTHING: A LOT

## 2019-04-25 NOTE — PROGRESS NOTES
Received change of shift report from roni RN. Assumed pt. Care at 0700. Pt. aox1-2, 2 assist, calm this a.m. Midline with + blood return, tko. Pt. Appears comfortable, nonlabored breathing, no complaints of pain. Falls education provided. Bed in lowest position with wheels locked. Call light within reach.

## 2019-04-25 NOTE — PROGRESS NOTES
Received change of shift report from roni RN. Assumed pt. Care at 0700. Pt. aox1-3, 2 assist, no signs of distress. Midline, tko. Family in to visit today, POC discussed and questions answered. MRI completed, pending results. Pt. Is incontinent of stool and urine. Bed in lowest position with wheels locked, hourly rounding in place. Call light within reach.

## 2019-04-25 NOTE — PROGRESS NOTES
Infectious Disease Progress Note    Author: Ashely Hinojosa M.D. Date & Time of service: 4/25/2019  10:11 AM    Chief Complaint:  Multiple brain lesions  Iliopsoas lesion    Interval History:  68-year-old female with history of diabetes and breast cancer, admitted 3/8/2019 with abdominal pain, iliopsoas lesion, and an abnormal MRI with multiple brain lesions.  4/13/2019 patient is somewhat confused.  No fevers.  No new issues overnight.  4/14/2019 no fevers.  Somewhat confused.  WBC is 4.  4/15/2019 no fevers.  Complains of back pain and joint pain.  WBCs 5.5  4/16/2019 patient was transferred to telemetry overnight.  Much more lethargic.  4/17/2019-no fevers.  Wants to know the plan.  More awake and responsive.  4/18/2019 patient has no fevers.  She is drenching and sweats.  Complains of hip pain.  4/19/2019-low-grade fevers.  No new issues overnight.  Wants to walk.  Complains of pain.  4/20 afebrile WBC 3.9 patient is awake and answering questions appropriately.  She does endorse some pain on the right hip.  CT revealed enhancing fluid collection in the right gluteal muscle consistent with a small abscess and a loculated enhancing fluid collection in the right iliac muscle.  Plan for IR drainage today  4/21 afebrile WBC 4.3 patient transferred to oncology.  She is resting comfortably without any complaint.  Drain was not placed yesterday  4/22 afebrile WBC 4.8 patient is drowsy as she just woke up.  She complains of a cough when she drinks water.  Fluid collections too small for drainage by IR   4/23 afebrile WBC 4.3 patient complaining of back pain.  Denies any hip pain today  4/24 AF WBC 4 complaining of R buttock and back pain. No issues reported overnight per nursing staff. Pt has history of sacral fracture s/p transsacral screw fixation on 12/17/18 4/25 afebrile WBC 5.3 patient very lethargic.  Repeat MRI of the brain reveals decrease in previous brain lesions however there are new abscesses that have  developed.  Iliac muscle abscess not amenable to drainage secondary to signs    labs Reviewed    Review of Systems:  Review of Systems   Constitutional: Positive for malaise/fatigue. Negative for diaphoresis and fever.   HENT: Negative for hearing loss.    Respiratory: Negative for cough and shortness of breath.    Cardiovascular: Negative for chest pain and leg swelling.   Gastrointestinal: Negative for nausea and vomiting.   Genitourinary: Negative for dysuria.   Musculoskeletal: Positive for back pain and joint pain. Negative for myalgias.   Neurological: Negative for sensory change and speech change.   All other systems reviewed and are negative.      Hemodynamics:  Temp (24hrs), Av.7 °C (98.1 °F), Min:36.3 °C (97.4 °F), Max:37.1 °C (98.7 °F)  Temperature: 36.6 °C (97.8 °F)  Pulse  Av.5  Min: 59  Max: 153  Blood Pressure : 126/62       Physical Exam:  Physical Exam   Constitutional: She appears well-developed and well-nourished. No distress.   Elderly   HENT:   Head: Normocephalic and atraumatic.   Eyes: Pupils are equal, round, and reactive to light. EOM are normal. Right eye exhibits no discharge. Left eye exhibits no discharge.   Neck: Neck supple.   Cardiovascular: Normal rate and intact distal pulses.    Murmur heard.  Pulmonary/Chest: Effort normal and breath sounds normal. No respiratory distress. She has no wheezes.   Abdominal: Soft. Bowel sounds are normal. She exhibits no distension. There is no tenderness.   Musculoskeletal: She exhibits no edema, tenderness or deformity.   Right upper extremity PICC line   Lymphadenopathy:     She has no cervical adenopathy.   Neurological: She is alert.   Moves all extremities    Muffled speech       Skin: Skin is warm. No rash noted.   Psychiatric:   Flat affect   Nursing note and vitals reviewed.    Meds:    Current Facility-Administered Medications:   •  potassium chloride SA  •  vancomycin  •  ibuprofen  •  MD Alert...Vancomycin per Pharmacy  •   hydrALAZINE  •  cinacalcet  •  amLODIPine  •  acetaminophen  •  meropenem  •  haloperidol lactate  •  QUEtiapine  •  gabapentin  •  labetalol  •  loperamide  •  losartan  •  Notify provider if pain remains uncontrolled **AND** Use the numeric rating scale (NRS-11) on regular floors and Critical-Care Pain Observation Tool (CPOT) on ICUs/Trauma to assess pain **AND** Pulse Ox (Oximetry) **AND** Pharmacy Consult Request **AND** If patient difficult to arouse and/or has respiratory depression, stop any opiates that are currently infusing and call a Rapid Response. **AND** [DISCONTINUED] oxyCODONE immediate-release **AND** [DISCONTINUED] oxyCODONE immediate-release **AND** [DISCONTINUED] morphine injection  •  omeprazole  •  ondansetron  •  ondansetron  •  pramipexole  •  Respiratory Care per Protocol    Labs:  Recent Labs      04/23/19 0026 04/24/19 0026 04/25/19   0029   WBC  4.3*  5.0  5.3   RBC  4.36  4.24  4.41   HEMOGLOBIN  10.8*  10.5*  11.2*   HEMATOCRIT  37.0  34.8*  36.0*   MCV  84.9  82.1  81.6   MCH  24.8*  24.8*  25.4*   RDW  56.5*  54.1*  53.2*   PLATELETCT  149*  143*  135*   MPV  11.3  10.3  11.2   NEUTSPOLYS  66.10  60.50  65.80   LYMPHOCYTES  20.40*  18.50*  12.30*   MONOCYTES  5.90  3.40  6.10   EOSINOPHILS  5.90  16.80*  14.00*   BASOPHILS  1.70  0.80  0.90   RBCMORPHOLO  Present  Normal  Present     Recent Labs      04/23/19 0026  04/24/19 0026  04/25/19   0029   SODIUM  133*  134*  138   POTASSIUM  3.3*  3.4*  3.0*   CHLORIDE  107  102  105   CO2  22  25  26   GLUCOSE  113*  76  77   BUN  21  18  18     Recent Labs      04/23/19 0026  04/24/19 0026  04/25/19   0029   ALBUMIN  2.5*  2.4*  2.5*   TBILIRUBIN  0.5  0.5  0.6   ALKPHOSPHAT  206*  239*  230*   TOTPROTEIN  6.1  6.4  6.7   ALTSGPT  15  18  19   ASTSGOT  39  46*  54*   CREATININE  0.66  0.66  0.52       Imaging:  CT abdomen and pelvis on 4/19/19  1.  Enhancing fluid collection in the right gluteal muscle, appearance compatible  "with small abscess.  2.  Loculated enhancing fluid collection in the right iliac is muscle, corresponding with site of prior drain, appearance compatible with reaccumulation of abscess.  3.  Fluid-filled distention of small bowel suggests ileus  4.  Atherosclerosis  5.  Trace pericardial effusion      Micro:  Results     Procedure Component Value Units Date/Time    BLOOD CULTURE [[id number]] Collected:  04/18/19 1536    Order Status:  Completed Specimen:  Blood from Peripheral Updated:  04/23/19 1700     Significant Indicator NEG     Source BLD     Site PERIPHERAL     Blood Culture No growth after 5 days of incubation.    Narrative:       Per Hospital Policy: Only change Specimen Src: to \"Line\" if  specified by physician order.    BLOOD CULTURE [[id number]] Collected:  04/18/19 1536    Order Status:  Completed Specimen:  Blood from Peripheral Updated:  04/23/19 1700     Significant Indicator NEG     Source BLD     Site PERIPHERAL     Blood Culture No growth after 5 days of incubation.    Narrative:       Per Hospital Policy: Only change Specimen Src: to \"Line\" if  specified by physician order.    URINALYSIS [[id number]]     Order Status:  No result Specimen:  Urine from Urine, Clean Catch           Assessment:  Active Hospital Problems    Diagnosis   • *Lesion of brain [G93.9]   • Mass of iliopsoas muscle group [M62.89]   • Encephalopathy [G93.40]   • History of breast cancer [Z85.3]   • Diabetes mellitus type 2 in obese (HCC) [E11.69, E66.9]       Plan:  Multiple brain lesions-likely abscesses versus metastatic lesions.  Persistent with new lesions noted on MRI on 4/24  Confusion improving and intermittent  MRI with scattered small lesions incl aaron, too small to biopsy per neurosurgery  TTE neg; CHAS neg  Bcxs neg  Repeat CT scan on 3/30/2019 shows decrease in the size of the brain lesions, supporting that these are abscesses  Repeat MRI brain on 4/24 decrease in size of brain lesions with new abscess " formation  Continue meropenem and vancomycin  Will plan an additional 4 weeks of IV antibiotics  Patient will need repeat MRI prior to completion of antibiotics-repeat MRI week of May 20th  Duration of antibiotics pending results of repeat MRI    Iliopsoas abscess, on treatment.  Persistent per imaging on 4/23  CT + 2.6 cm lesion in the right iliopsoas region  S/p aspiration +WBCs-cultures negative to date  Bcxs neg  D/c cefepime 03/26 secondary to confusion  Tolerating Meropenem started 03/26  MRI w/ contrast lumbar, pelvis 03/27 - Iliopsoas and gluteal fluid collections noted.    Drain placed 3/29/2019.  Cultures negative  Another drain was placed on 4/4/2019 and it shows purulent fluid  Repeat CT from 4/8 with resolution of the fluid collections after drainage  Repeat CT on 4/19 revealed right gluteal muscle abscess in addition to recurrent abscess in the right iliac muscle.  Fluid collections too small for drainage by IR  Continue meropenem and vancomycin  Monitor renal function and vanco trough 24.4 on 4/23  Duration of antibiotics clinical  Repeat CT 4/23 - R gluteal fluid collection unchanged; R iliacus fluid collection increased in size now measuring 2.7 cm.  Right iliac is fluid collection not amenable to drainage given size    Type 2 diabetes mellitus  Hemoglobin A1c 6.3  Keep BS under 150 to help control current infection    Altered mental status, improved overall  Appears to be waxing and waning  Multifactorial, likely large contribution from her multiple brain abscesses, monitor  Persistent brain lesions/abscesses noted on repeat MRI on 4/24    H/o breast cancer    I have performed a physical exam and reviewed and updated ROS and plan today 4/25/2019.  In review of yesterday's note 4/24/2019, there are no changes except as documented above.    Disposition: Family and primary care team working on LTAC in [place]    Discussed with Dr. Keller and RN

## 2019-04-25 NOTE — PROGRESS NOTES
Pharmacy Kinetics 68 y.o. 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 There is concern that this is infectious. All cultures are negative to date, but were all drawn while on antibiotics. CHAS was negative. Patient has a history of breast cancer and DM. ID is following. Plan for completion of abx (6 weeks) in house prior to transfer to [facility] in [place].      Other antibiotics: Meropenem 2g IV q8h     Allergies: Patient has no known allergies.      List concerns for renal function: Age, prolonged vancomycin therapy, BUN:SCr>20:1, albumin 2.5, CT w/contrast , NSAID use     Pertinent cultures to date:   19: wound - right gluteal mass: negative   19: blood - peripheral x 2: negative   19: wound - right buttock: negative   19: blood - peripheral x 2: negative   19: wound - retroperitoneal drain aspirate: negative   19:PBCx2:NGTD     MRSA nares swab if pneumonia is a concern (ordered/positive/negative/n-a): N/A    Recent Labs      19   0026  19   0026  19   0029   WBC  4.3*  5.0  5.3   NEUTSPOLYS  66.10  60.50  65.80     Recent Labs      19   0026  19   0026  19   0029   BUN  21  18  18   CREATININE  0.66  0.66  0.52   ALBUMIN  2.5*  2.4*  2.5*     Recent Labs      19   1946   VANCOTROUGH  24.4*     Intake/Output Summary (Last 24 hours) at 19 1310  Last data filed at 19 0810   Gross per 24 hour   Intake             2277 ml   Output              125 ml   Net             2152 ml      /62   Pulse (!) 109   Temp 36.6 °C (97.8 °F) (Temporal)   Resp 18   Ht 1.524 m (5')   Wt 64.6 kg (142 lb 6.7 oz)   SpO2 90%  Temp (24hrs), Av.7 °C (98.1 °F), Min:36.3 °C (97.4 °F), Max:37.1 °C (98.7  "°F)    A/P   1. Vancomycin dose change: Continue vancomycin 500 mg IV Q24h (due @ 2100)  2. Next vancomycin level: 4/27 @ 2030  3. Goal trough: 16-20 mcg/mL  4. A/P: Tmax 98.7 without leukocytosis. Brief episode of tachycardia/hypertension overnight, otherwise VSS. Renal indices stable, no s/s of vancomycin toxicity present. Repeat MRI brain revealed \"new enhancing lesions in the left basal ganglia and right cerebellum consistent with interval new abscesses..\" ID following, anticipate an additional 4 weeks of IV abx given new findings; repeat MRI week of May 20th. Vanco trough 4/23 slightly above goal, subsequently dose reduced to 500 mg IV Q24h as outlined above. Continue present dose and obtain a repeat trough on 4/27. Pharmacy to monitor and adjust as needed.     Esther Taylor, PharmD, BCOP      "

## 2019-04-25 NOTE — PROGRESS NOTES
"[facility] Medicine Daily Progress Note    Date of Service  4/25/2019    Chief Complaint  68 y.o. female admitted 3/8/2019 with right hip pain, fever and abnormal brain mri.    Hospital Course    Patient started on empiric antibiotics given fever.  She had abnormal findings on MRI brain and CT showed \"lesion\" on right iliopsoas.  This lesion was biopsied and turned out to be abscess.  She has history of breast cancer and there is also concern of brain lesions being metastatic though their appearance is not typical of this.      Interval Problem Update  3/12 Discussed with Dr Turner for second opinion of brain lesions and based on appearance not able to r/o or r/i any diagnosis.  Given patient's presentation of confusion, hip pain and fever  - this is more supportive of infectious etiology rather than malignant.  Fluid from right gluteal area sent to micro and as of yet - no growth.  Continue zosyn for now.  3/13 Patient with slight improvement in mentation.  Blood cultures and abscess cultures are showing no growth at this time.  TTE being done while I examined patient - no evidence of vegetations on this test.  ID consultation pending - d/w Dr Garcia.  3/14 Patient feeling well today, her pain is present but not as bad as prior to admission.  She was able to follow the conversation today and is agreeable to move forward with the CHAS tomorrow.  I spoke with her brother, René, and updated him on condition yesterday afternoon also.  Will also have PICC placed in next few days as long as blood cultures remain negative as I expect a prolonged antibiotic course of treatment.  3/15 Patient without new complaints, states she needs help to move in bed.  CHAS showed no evidence of vegetations and regular diet resumed.  Culture remains negative and biopsy of brain lesion may prove needed - will watch through the weekend and if mentation does not continue to improve, will discuss with neurosurgery on Monday.  3/16 Patient states " she is okay today, mentation has waxed and waned without significant improvement.  She was febrile earlier today.  No other acute events.  3/17 Patient with significant improvement in mentation today, she is aware of her hospitalization, not falling asleep mid sentence.  Her pain mediations were held most of yesterday and likely far too strong for patient and were affecting her awareness.  Oxycodone 5 discontinued and will use tramadol instead unless pain not well controlled.  CT brain with contrast ordered as a quick scan for comparison to MRI.    3/18 Patient feeling same today, discussed need to discuss with neurosurgery for possible biopsy.  Discussed with Dr Nguyen, he reviewed MRI and CT brain and he feels they are likely metastatic lesions but are far too small to biopsy.  His recommendation is to continue with antibiotics and re-image in 2 weeks to see if any change that would be amenable to biopsy or that would further declare infection.  3/26 Patient mental status continues to wax and wane.  ID ordered MRI lumbar, pelvis and sacrum for evaluation for source of infection, patient with continued low back pain and no real improvement since I saw her 7 days prior with continued antibiotics during this time.  Cultures have not growth pathogen, repeat MRI 3/28.   3/27 Patient somnolent most of the day today, MRI's completed and showing 2 areas of fluid collections, given her history are likely abscesses and will require drainage, CT guided drainage requested and patient NPO at [facility] for this.  D/w ID - cultures will be done on fluid collection.  Repeat CT brain ordered for tomorrow.  3/28 Patient up to chair, diet resumed as lovenox given this am and drainage of abscesses deferred until tomorrow.  Vanco/merrem to continue and cultures will be collected from abscesses.  3/29 Patient went to IR today, had drain placed in the right buttock into abscess cavity and fluid sent for culture.  Potassium is slightly low,  continue with PO replacement.  HR has improved from yesterday but patient PO intake still not at normal level, increase IVF rate to 100cc/hour.  3/30 Patient without fever since drain placed right buttock.  Purulent material in ÁNGEL bulb. Cultures thus far are still showing no growth.  Antibiotics to continue.  CT head ordered to evaluate change in past 2 weeks as recommended by neurosurgery.  3/31 Patient with fevers overnight - was altered during this time but has recovered.  She denies pain when examined by me.  She still has purulent material in the drainage tubing.  Will continue with current IV antibiotics - 6 weeks total antibiotics suspected - end date would be 4/24/19.  D/w ID.  4/1 Patient remains intermittently somnolent.  She wakes easily but falls back to sleep quickly.  She remained afebrile overnight.  ÁNGEL drain continues to drain purulent material.  ABX through 4/24 - once accepting facility found, patient okay to transfer with midline intact for completion of antibiotics.  4/9 Patient somnolent when examined.  CT showed resolution of fluid collection where ÁNGEL in place.  RN to remove ÁNGEL today.  All cultures still remain without growth.  4/10 Patient seen when working with OT, she is awake but gets off task quickly and is easily fatigued.  ÁNGEL drain removed yesterday.  Patient states she has pain in the lumbar spine when she coughs - just above her surgical area.  She has been in bed for nearly 1 month and this is likely not helping her back pain - educated need for OOB as often as tolerated.  4/11 Patient had bowel incontinence prior to my entering room, states she thinks she needs a bed pan.  CNA notified.  She continues to have low back pain in the area of her surgery and given her bed-bound status during her infection and intermittent confusion, this is not unexpected.  Continuing with therapy recommended and patient is 1:1 feeding given her weakness to feed herself.  4/12 Patient tearful this am, she  is concerned that she will not walk again, support given and educated that she is weak and the inability to walk is only temporary.  She needs to work with therapy and get stronger and I need to stop her narcotics and only give tyelnol/ibuprofen for pain management along with non-narcotic alternatives.  I spoke to patient's brother, René, at her request.  4/13 Patient up to chair when evaluated, mentation improved with discontinuation of narcotics. Brother is coming to visit this weekend and she is looking forward to this.  Ortho recommendations still pending - per ID she spoke to Dr Porter who is consulting Dr Sandra for recommendations.  4/14 Patient up to chair with assistance today.  CT brain remains same as prior 2 weeks ago.  Still pending ortho consultation.  4/15 Patient to be seen by ortho today per Dr Porter.  D/w dietary - patient with weight loss and poor po intake, she needs to be a 1:1 feed patient and if she fails this, will likely need tube feeding to get adequate nutrition.    4/23 Patient up to chair with sister much of the morning and had uncontrolled pain following this.  One time dose of oxycodone given with great relief but patient sleeping since that dose.  Sister is concerned about her breathing as she is only taking small breaths at times, given need for re-imaging of abdomen/pelvis, will add on chest for further evaluation.  Lungs sounded clear on ascultation.  Brother also up visiting with his wife.  Ortho did consult, recommended hardware needs to remain in place for at least 6 months or non-union likely to occur, continue with antibiotics.  4/24 Patient in bed today, much less somnolent than yesterday as no narcotics given.  She complains of pain but still remains lethargic also at times.  Tylenol and ibuprofen to alternate for pain control.  ABX to continue, fluid collections very similar in size when compared to previous imaging - gluteal pocket is 18mm (20mm) and ilacus is  2.7cm from 3.4x2.0cm.  These fluid collections are still to small to drain.  MRI brain is pending.  4/25 Patient with many questions today, she asked why she speaks funny and is hard to understand, why her left leg now hurts, if she will walk again, etc.  She is also having difficulty sleeping at night again - I will increase her seroquel qhs dose from 25 to 37.5mg.  MRI results reviewed with patient, she will need at lease another 4 weeks of antibiotics and a repeat MRI brain around may 24th.  She is clinically stable and [facility] and can likely travel via private vehicle as needed.    Consultants/Specialty  ID - Ashish Rogers - s/o    Code Status  full    Disposition  SNF/LTAC in [place]    Review of Systems  Review of Systems   Constitutional: Negative for chills and fever.   HENT: Negative for congestion and sore throat.    Eyes: Negative for blurred vision and photophobia.   Respiratory: Negative for cough and shortness of breath.    Cardiovascular: Negative for chest pain, claudication and leg swelling.   Gastrointestinal: Negative for abdominal pain, constipation, diarrhea, heartburn, nausea and vomiting.   Genitourinary: Negative for dysuria and hematuria.   Musculoskeletal: Positive for back pain (lumbar/sacral area ) and myalgias (right hip and buttock pain.). Negative for joint pain.   Skin: Negative for itching and rash.   Neurological: Negative for dizziness, sensory change, speech change, weakness and headaches.   Psychiatric/Behavioral: Negative for depression. The patient is not nervous/anxious and does not have insomnia.         Physical Exam  Temp:  [36.3 °C (97.4 °F)-37.1 °C (98.7 °F)] 36.6 °C (97.8 °F)  Pulse:  [] 109  Resp:  [18-19] 18  BP: (117-181)/(60-86) 126/62  SpO2:  [90 %-97 %] 90 %    Physical Exam   Constitutional: She is oriented to person, place, and time. She appears well-developed and well-nourished. No distress.   HENT:   Head: Normocephalic  and atraumatic.   Eyes: Conjunctivae are normal. No scleral icterus.   Neck: Neck supple. No JVD present.   Cardiovascular: Normal rate, regular rhythm and normal heart sounds.  Exam reveals no gallop and no friction rub.    No murmur heard.  Pulmonary/Chest: Effort normal and breath sounds normal. No respiratory distress. She exhibits no tenderness.   Abdominal: Soft. Bowel sounds are normal. She exhibits no distension. There is no guarding.   Musculoskeletal: She exhibits no edema or tenderness.          Lymphadenopathy:     She has no cervical adenopathy.   Neurological: She is alert and oriented to person, place, and time. No cranial nerve deficit.   Mentation labile     Skin: Skin is warm and dry. She is not diaphoretic. No erythema. No pallor.   Psychiatric: She has a normal mood and affect. Her behavior is normal.   Nursing note and vitals reviewed.      Fluids    Intake/Output Summary (Last 24 hours) at 04/25/19 1349  Last data filed at 04/25/19 0810   Gross per 24 hour   Intake             2277 ml   Output              125 ml   Net             2152 ml       Laboratory  Recent Labs      04/23/19   0026  04/24/19   0026  04/25/19   0029   WBC  4.3*  5.0  5.3   RBC  4.36  4.24  4.41   HEMOGLOBIN  10.8*  10.5*  11.2*   HEMATOCRIT  37.0  34.8*  36.0*   MCV  84.9  82.1  81.6   MCH  24.8*  24.8*  25.4*   MCHC  29.2*  30.2*  31.1*   RDW  56.5*  54.1*  53.2*   PLATELETCT  149*  143*  135*   MPV  11.3  10.3  11.2     Recent Labs      04/23/19   0026  04/24/19   0026  04/25/19   0029   SODIUM  133*  134*  138   POTASSIUM  3.3*  3.4*  3.0*   CHLORIDE  107  102  105   CO2  22  25  26   GLUCOSE  113*  76  77   BUN  21  18  18   CREATININE  0.66  0.66  0.52   CALCIUM  11.2*  10.5  10.1                   Imaging  MR-BRAIN-WITH & W/O   Final Result      Multiple punctate enhancing lesions seen throughout the supra and infratentorial brain parenchyma. When compared with the previous MRI there has been interval decrease in  the size of the lesions. There are also interval reduction in the extent of the    surrounding white matter edema in the restricted diffusion consistent with improvement/treatment response of the most of the multifocal brain abscesses. However there are new enhancing lesions in the left basal ganglia and right cerebellum consistent    with interval new abscesses.Follow-up is recommended to ensure the complete resolution of the enhancing lesions. Please note that the radiological differential diagnosis of this multifocal lesions still includes metastasis. However decrease in the size    of the most of the lesions favors the diagnosis of infection.         CT-CHEST,ABDOMEN,PELVIS WITH   Final Result      1.  Limited evaluation of the thorax due to extensive patient respiratory motion artifact.      2.  Borderline enlarged bilateral axillary lymph nodes.      3.  Linear atelectasis within the lungs with patchy groundglass opacity bilaterally.      4.  Again seen screws extending through the sacrum bilaterally. There is surrounding lucency and fragmentation of the sacrum as well as the right greater than left ilium and a right iliac fracture. It is uncertain if these represent metastatic lesions    with pathologic fracture versus insufficiency fractures.      5.  Fluid collections within the right gluteal and iliacis muscles.. The collection within the right gluteal muscle has unchanged while the fluid collection within the right iliacis muscle is increased somewhat in size. Differential diagnosis includes    hematoma as well as abscess.         6.  Enlarged retroperitoneal, periportal and portacaval lymph nodes.      DX-CHEST-LIMITED (1 VIEW)   Final Result      No acute cardiac or pulmonary abnormalities are identified. Lung volumes are low.      CT-ABDOMEN-PELVIS WITH   Final Result         1.  Enhancing fluid collection in the right gluteal muscle, appearance compatible with small abscess.   2.  Loculated enhancing  fluid collection in the right iliac is muscle, corresponding with site of prior drain, appearance compatible with reaccumulation of abscess.   3.  Fluid-filled distention of small bowel suggests ileus   4.  Atherosclerosis   5.  Trace pericardial effusion      CT-CTA CHEST PULMONARY ARTERY W/ RECONS   Final Result         1.  No large central pulmonary embolus is appreciated, evaluation of the subsegmental branches is essentially nondiagnostic due to motion artifacts. Additional imaging would be required for definitive exclusion of small distal pulmonary emboli.   2.  Trace pericardial effusion   3.  Hepatomegaly   4.  Atherosclerosis.   5.  Right pulmonary nodules, the largest is a 7.7 mm pulmonary nodule, see nodule follow-up recommendations below.      Low Risk: CT at 3-6 months, then consider CT at 18-24 months      High Risk: CT at 3-6 months, then at 18-24 months      Comments: Use most suspicious nodule as guide to management. Follow-up intervals may vary according to size and risk.      Low Risk - Minimal or absent history of smoking and of other known risk factors.      High Risk - History of smoking or of other known risk factors.      Note: These recommendations do not apply to lung cancer screening, patients with immunosuppression, or patients with known primary cancer.      Fleischner Society 2017 Guidelines for Management of Incidentally Detected Pulmonary Nodules in Adults         DX-CHEST-PORTABLE (1 VIEW)   Final Result         1.  Mild pulmonary edema and/or infiltrate   2.  Cardiomegaly      CT-HEAD WITH & W/O   Final Result      1.  No change in scattered hyperenhancing lesions throughout the brain, cerebellum and visualized brainstem. Some of them demonstrate mild associated edema, similar to prior study.   2.  No CT evidence of infarct or hemorrhage.      CT-NEEDLE BX-MUSCLE RIGHT   Final Result   Addendum 1 of 1   Addendum:   The biopsy/drainage was done utilizing low dose optimization CT  techniques    including auto modulation for  imaging and low mA CT/fluoroscopy mode    for the procedure.      Final      CT-guided aspiration of pus like material in the right gluteal muscle lesion.      CT-ABDOMEN-PELVIS WITH   Final Result      1.  Resolution of right iliacus abscess with no associated fluid adjacent to the drainage catheter      2.  Interval removal of right gluteal drainage catheter      3.  Surgical screw traversing both sacroiliac joint with associated pathologic fracture of the right ilium and right medial sacrum      4.  Hepatomegaly      CT-DRAIN-HEMATOMA - SEROMA   Final Result      1.  CT-GUIDED RETROPERITONEAL (EXTRAPERITONEAL) RIGHT PELVIC ABSCESS DRAINAGE AT THE RIGHT ILIOPSOAS. ORDERS WERE WRITTEN FOR ONCE DAILY IRRIGATION OF THE CATHETER WITH 5 ML STERILE SALINE.      2.  THE CURRENT PLAN IS TO MONITOR DRAINAGE OUTPUT AND OBTAIN A FOLLOWUP CT SCAN IN 5-7 DAYS IF CLINICALLY INDICATED.      CT-ABDOMEN-PELVIS WITH   Final Result      1.  Rim-enhancing fluid collection in the right iliopsoas muscle may have increased in size slightly from the prior exam.      2.  Pigtail catheter in the right gluteus medius muscle. No residual fluid collection is appreciated.      3.  Pathologic fracture of the right ilium. Status post right SI joint fusion.      4.  Atherosclerosis.      5.  Cholelithiasis.      CT-HEAD WITH & W/O   Final Result      1.  Interval decrease in size and level of enhancement of multiple small punctate foci in both cerebral hemispheres and in the midbrain consistent with improving septic emboli.      2.  No hemorrhage or mass effect.      CT-DRAIN-RETROPERITONEAL   Final Result      1.  CT GUIDED CATHETER DRAINAGE OF RIGHT GLUTEAL ABSCESS.   2.  THE CURRENT PLAN IS TO OBTAIN A FOLLOW-UP CT SCAN IN 5-7 DAYS IF INDICATED. ORDERS WERE WRITTEN FOR ONCE DAILY IRRIGATION OF THE CATHETER WITH 5 ML STERILE SALINE FOR 3 DAYS.   3. THE ILIOPSOAS COLLECTION WAS NOT AMENABLE TO  CATHETER DRAINAGE AS INTERVENING BOWEL AND/OR BONY STRUCTURES OBSTRUCT A PATHWAY TO THIS COLLECTION AT THIS TIME.      MR-LUMBAR SPINE-WITH & W/O   Final Result      1.  There are multifocal enhancing fluid collections in the right iliopsoas muscles and gluteus muscles adjacent to the right ilium. Right iliopsoas fluid collection measures an approximately 3.9 x 2.9 cm. The right gluteal fluid collection measures an    approximately 3.5 x 2.8 cm in size. The differential diagnosis includes postsurgical seroma and abscess.   2.  Post operative and degenerative changes as described above.      MR-PELVIS-WITH & W/O AND SEQUENCES   Final Result      1.  Surgical screw traverses both sacroiliac joint across presumed pathologic fracture of the right sacrum and the hardware obscures the adjacent tissues.      2.  No other evidence for bony metastatic disease      3.  Right gluteal musculature rim-enhancing fluid collection measuring 3.4 x 2.8 cm. This could be a postoperative seroma versus abscess      4.  osteoarthritis of both hip joint      5.  Prior hysterectomy      IR-MIDLINE CATHETER INSERTION WO GUIDANCE > AGE 3   Final Result                  Ultrasound-guided midline placement performed by qualified nursing staff    as above.          CT-HEAD WITH   Final Result      Multiple subcentimeter too numerous to count enhancing masses scattered throughout the supratentorial and infratentorial brain. These demonstrate some surrounding vasogenic edema and most likely represent multifocal metastatic lesions. Other    considerations would include multifocal abscesses or septic emboli.      EC-CHAS W/O CONT   Final Result      EC-CHAS W/O CONT   Final Result      EC-ECHOCARDIOGRAM COMPLETE W/O CONT   Final Result      OUTSIDE IMAGES-CT CHEST/ABDOMEN/PELVIS   Final Result      OUTSIDE IMAGES-DX CHEST   Final Result      [id number] FILM X-RAY   Final Result      OUTSIDE IMAGES-MR BRAIN   Final Result      OUTSIDE  IMAGES-MR BRAIN   Final Result           Assessment/Plan  * Lesion of brain   Assessment & Plan    IR Bx of psoas muscle ordered - was abscess  Continued treatment of infection  TTE and CHAS negative for vegetations.  MRI brain to repeat  Palliative consulted.  Vanco/merrem per ID  MRI brain 4/24 showing improvement old lesions but new lesions in right cerebellum and left basal ganglion  MRI brain due in 1 month for re-evaluation of duration of antibiotics, currently plan to continue for at least 4 more weeks.         Encephalopathy   Assessment & Plan    Waxes and wanes.  Likely secondary to multiple brain lesions.  Avoid benzodiazepines and/or anticholinergic medications.  Avoid sedatives or hypnotics.  At baseline for now.       Mass of iliopsoas muscle group   Assessment & Plan    Return of fluid collection, repeat drainage in IR with cultures, drain placed 3/29 - 10ml purulent material drained and sent for culture - no growth a/o 3/30  Was on linezolid and cefepime and now on vanco/merrem - end date to be > 6 weeks of treatment as now clinical resolution needed  ID following   CHAS negative for endocarditis.  Patient has sacral screws, Orthopedic surgery recommended hardware to stay for at least 6 months to promote adequate healing and to continue with antibiotics for now.  4/20: Patient did not qualify for IR aspiration and drain placement for the above-mentioned abscesses as they were too small.  repeat CT abdomen/pelvis 4/23 - fluid collections still too small to drain  MRI brain - old lesions improving but unfortunately new lesions right cerebellum and left basal ganglion.       Pancytopenia (HCC)   Assessment & Plan    Could be due to toxic effects of infection on bone marrow.  Afebrile.  ANC within normal limits.  Continue to monitor.  Gradually improving.  Resolved although normal white blood cell count and normal ANC.  Mild thrombocytopenia.       Hyponatremia- (present on admission)   Assessment & Plan     Mildly low - doubt contribution to mentation         RLS (restless legs syndrome)- (present on admission)   Assessment & Plan    Pramipexole       Diabetes mellitus type 2 in obese (HCC)- (present on admission)   Assessment & Plan    Well controlled   Insulin sliding scale with hypoglycemia protocol.  Continue to monitor       History of breast cancer- (present on admission)   Assessment & Plan    Status post mastectomy.  Repeat CT showed decrease in size with antibiotics wishes consistent with abscesses rather than metastasis.  Continue IV antibiotis  Appreciate infectious disease recommendations.       COPD (chronic obstructive pulmonary disease) (HCC)- (present on admission)   Assessment & Plan    Currently maintaining saturation on room air.  Respiratory therapy per protocol.     Hypercalcemia- (present on admission)   Assessment & Plan    Mild   Continue with Sensipar.  Monitor and adjust Sensipar dose accordingly.       Sinus tachycardia- (present on admission)   Assessment & Plan    Likely due to combination of infection and volume loss.  Echocardiogram with normal ejection fraction and no significant valvular abnormalities.  Tachycardia has resolved     Urinary tract infection   Assessment & Plan    Culture remain no growth.       Fungal infection of the groin   Assessment & Plan    Resolved         History of deep vein thrombosis- (present on admission)   Assessment & Plan    History of deep vein thrombosis   Was on Rivaroxaban as outpatient.     Continues to be on hold as interventions often with drain placement/removal  Start prophylactic lovenox once no further interventions needed.           Essential hypertension- (present on admission)   Assessment & Plan    Restarted on losartan and furosemide with hold parameters.  Continue to monitor     Fever   Assessment & Plan    PRN Tylenol.  Continue monitor     GERD (gastroesophageal reflux disease)- (present on admission)   Assessment & Plan     Omeprazole     Chronic pain- (present on admission)   Assessment & Plan     Multifactorial  Likely secondary to abscess, fracture, bed bound status  Pain control, avoid narcotics given severe impact on patient's mentation.              VTE prophylaxis: scds

## 2019-04-25 NOTE — THERAPY
"Occupational Therapy Treatment completed with focus on ADLs.  Functional Status:  Pt seen for OT treatment. She sat edge of bed for about 10 minutes. She complained of pain and asked to return to supine. Once back in bed, she brushed her teeth with max assist and participated in BUE AROM and AAROM. Pt has a BM in bed and required total assist to clean. Pt with significant weakness and deconditioning ipmacting her ability to complete simple self-care tasks.   Plan of Care: Will benefit from Occupational Therapy 3 times per week  Discharge Recommendations:  Equipment Will Continue to Assess for Equipment Needs. Post-acute therapy: Recommend inpatient transitional care services for continued occupational therapy services.     See \"Rehab Therapy-Acute\" Patient Summary Report for complete documentation.   "

## 2019-04-25 NOTE — PROGRESS NOTES
/64   Pulse (!) 102   Temp 36.3 °C (97.4 °F) (Temporal)   Resp 19   Ht 1.524 m (5')   Wt 64.6 kg (142 lb 6.7 oz)   SpO2 96%   Breastfeeding? No   BMI 27.81 kg/m²     Pt is AOX3. Disoriented to time. Denies SOB, chest pain, n/v. Pain is 1/10- repositioned pt. Elevated B/P of 181/86. Given IV hydralazine. Midline in left upper arm is patent with positive blood return. Call light within reach. Bed is locked in lowest position with bed alarm on. POC discussed.

## 2019-04-25 NOTE — CARE PLAN
Problem: Skin Integrity  Goal: Risk for impaired skin integrity will decrease  Outcome: PROGRESSING AS EXPECTED  Waffle cushion, barrier wipes, and Q2 turns in place    Problem: Psychosocial Needs:  Goal: Level of anxiety will decrease  Outcome: PROGRESSING AS EXPECTED  Pt. Calm this shift, responds well to emotional support

## 2019-04-26 PROBLEM — G47.00 INSOMNIA: Status: ACTIVE | Noted: 2019-04-26

## 2019-04-26 LAB
ALBUMIN SERPL BCP-MCNC: 2.5 G/DL (ref 3.2–4.9)
ALBUMIN/GLOB SERPL: 0.6 G/DL
ALP SERPL-CCNC: 229 U/L (ref 30–99)
ALT SERPL-CCNC: 19 U/L (ref 2–50)
ANION GAP SERPL CALC-SCNC: 5 MMOL/L (ref 0–11.9)
AST SERPL-CCNC: 52 U/L (ref 12–45)
BASOPHILS # BLD AUTO: 0.6 % (ref 0–1.8)
BASOPHILS # BLD: 0.03 K/UL (ref 0–0.12)
BILIRUB SERPL-MCNC: 0.6 MG/DL (ref 0.1–1.5)
BUN SERPL-MCNC: 17 MG/DL (ref 8–22)
CALCIUM SERPL-MCNC: 9.4 MG/DL (ref 8.5–10.5)
CHLORIDE SERPL-SCNC: 107 MMOL/L (ref 96–112)
CO2 SERPL-SCNC: 27 MMOL/L (ref 20–33)
CREAT SERPL-MCNC: 0.57 MG/DL (ref 0.5–1.4)
EOSINOPHIL # BLD AUTO: 0.61 K/UL (ref 0–0.51)
EOSINOPHIL NFR BLD: 13.1 % (ref 0–6.9)
ERYTHROCYTE [DISTWIDTH] IN BLOOD BY AUTOMATED COUNT: 53.1 FL (ref 35.9–50)
GLOBULIN SER CALC-MCNC: 4.1 G/DL (ref 1.9–3.5)
GLUCOSE SERPL-MCNC: 80 MG/DL (ref 65–99)
HCT VFR BLD AUTO: 34.6 % (ref 37–47)
HGB BLD-MCNC: 10.9 G/DL (ref 12–16)
IMM GRANULOCYTES # BLD AUTO: 0.02 K/UL (ref 0–0.11)
IMM GRANULOCYTES NFR BLD AUTO: 0.4 % (ref 0–0.9)
LYMPHOCYTES # BLD AUTO: 1.08 K/UL (ref 1–4.8)
LYMPHOCYTES NFR BLD: 23.2 % (ref 22–41)
MAGNESIUM SERPL-MCNC: 1.6 MG/DL (ref 1.5–2.5)
MCH RBC QN AUTO: 25.6 PG (ref 27–33)
MCHC RBC AUTO-ENTMCNC: 31.5 G/DL (ref 33.6–35)
MCV RBC AUTO: 81.2 FL (ref 81.4–97.8)
MONOCYTES # BLD AUTO: 0.6 K/UL (ref 0–0.85)
MONOCYTES NFR BLD AUTO: 12.9 % (ref 0–13.4)
NEUTROPHILS # BLD AUTO: 2.32 K/UL (ref 2–7.15)
NEUTROPHILS NFR BLD: 49.8 % (ref 44–72)
NRBC # BLD AUTO: 0 K/UL
NRBC BLD-RTO: 0 /100 WBC
PLATELET # BLD AUTO: 117 K/UL (ref 164–446)
PMV BLD AUTO: 10.7 FL (ref 9–12.9)
POTASSIUM SERPL-SCNC: 3 MMOL/L (ref 3.6–5.5)
PROT SERPL-MCNC: 6.6 G/DL (ref 6–8.2)
RBC # BLD AUTO: 4.26 M/UL (ref 4.2–5.4)
SODIUM SERPL-SCNC: 139 MMOL/L (ref 135–145)
WBC # BLD AUTO: 4.7 K/UL (ref 4.8–10.8)

## 2019-04-26 PROCEDURE — 85025 COMPLETE CBC W/AUTO DIFF WBC: CPT

## 2019-04-26 PROCEDURE — A9270 NON-COVERED ITEM OR SERVICE: HCPCS | Performed by: INTERNAL MEDICINE

## 2019-04-26 PROCEDURE — 83735 ASSAY OF MAGNESIUM: CPT

## 2019-04-26 PROCEDURE — 97530 THERAPEUTIC ACTIVITIES: CPT

## 2019-04-26 PROCEDURE — 700105 HCHG RX REV CODE 258: Performed by: INTERNAL MEDICINE

## 2019-04-26 PROCEDURE — 700111 HCHG RX REV CODE 636 W/ 250 OVERRIDE (IP): Performed by: FAMILY MEDICINE

## 2019-04-26 PROCEDURE — A9270 NON-COVERED ITEM OR SERVICE: HCPCS | Performed by: FAMILY MEDICINE

## 2019-04-26 PROCEDURE — 97116 GAIT TRAINING THERAPY: CPT

## 2019-04-26 PROCEDURE — 700111 HCHG RX REV CODE 636 W/ 250 OVERRIDE (IP): Performed by: INTERNAL MEDICINE

## 2019-04-26 PROCEDURE — 99233 SBSQ HOSP IP/OBS HIGH 50: CPT | Performed by: INTERNAL MEDICINE

## 2019-04-26 PROCEDURE — 770009 HCHG ROOM/CARE - ONCOLOGY SEMI PRI*

## 2019-04-26 PROCEDURE — 36415 COLL VENOUS BLD VENIPUNCTURE: CPT

## 2019-04-26 PROCEDURE — 80053 COMPREHEN METABOLIC PANEL: CPT

## 2019-04-26 PROCEDURE — 97535 SELF CARE MNGMENT TRAINING: CPT

## 2019-04-26 PROCEDURE — 99232 SBSQ HOSP IP/OBS MODERATE 35: CPT | Performed by: INTERNAL MEDICINE

## 2019-04-26 PROCEDURE — 700102 HCHG RX REV CODE 250 W/ 637 OVERRIDE(OP): Performed by: INTERNAL MEDICINE

## 2019-04-26 PROCEDURE — 700102 HCHG RX REV CODE 250 W/ 637 OVERRIDE(OP): Performed by: FAMILY MEDICINE

## 2019-04-26 RX ORDER — QUETIAPINE FUMARATE 25 MG/1
50 TABLET, FILM COATED ORAL EVERY EVENING
Status: DISCONTINUED | OUTPATIENT
Start: 2019-04-26 | End: 2019-04-30 | Stop reason: HOSPADM

## 2019-04-26 RX ADMIN — GABAPENTIN 300 MG: 300 CAPSULE ORAL at 04:59

## 2019-04-26 RX ADMIN — OMEPRAZOLE 20 MG: 20 CAPSULE, DELAYED RELEASE ORAL at 04:59

## 2019-04-26 RX ADMIN — GABAPENTIN 300 MG: 300 CAPSULE ORAL at 18:10

## 2019-04-26 RX ADMIN — POTASSIUM CHLORIDE 40 MEQ: 1500 TABLET, EXTENDED RELEASE ORAL at 04:59

## 2019-04-26 RX ADMIN — MEROPENEM 2 G: 1 INJECTION, POWDER, FOR SOLUTION INTRAVENOUS at 05:10

## 2019-04-26 RX ADMIN — ACETAMINOPHEN 650 MG: 325 TABLET, FILM COATED ORAL at 10:19

## 2019-04-26 RX ADMIN — VANCOMYCIN HYDROCHLORIDE 500 MG: 100 INJECTION, POWDER, LYOPHILIZED, FOR SOLUTION INTRAVENOUS at 21:42

## 2019-04-26 RX ADMIN — MEROPENEM 2 G: 1 INJECTION, POWDER, FOR SOLUTION INTRAVENOUS at 11:57

## 2019-04-26 RX ADMIN — HYDRALAZINE HYDROCHLORIDE 20 MG: 20 INJECTION INTRAMUSCULAR; INTRAVENOUS at 09:22

## 2019-04-26 RX ADMIN — AMLODIPINE BESYLATE 10 MG: 10 TABLET ORAL at 04:59

## 2019-04-26 RX ADMIN — LOSARTAN POTASSIUM 50 MG: 50 TABLET ORAL at 04:59

## 2019-04-26 RX ADMIN — MEROPENEM 2 G: 1 INJECTION, POWDER, FOR SOLUTION INTRAVENOUS at 18:10

## 2019-04-26 RX ADMIN — IBUPROFEN 600 MG: 400 TABLET, FILM COATED ORAL at 11:57

## 2019-04-26 RX ADMIN — QUETIAPINE FUMARATE 50 MG: 25 TABLET ORAL at 18:10

## 2019-04-26 RX ADMIN — IBUPROFEN 600 MG: 400 TABLET, FILM COATED ORAL at 03:06

## 2019-04-26 RX ADMIN — CINACALCET HYDROCHLORIDE 60 MG: 30 TABLET, FILM COATED ORAL at 04:59

## 2019-04-26 ASSESSMENT — COGNITIVE AND FUNCTIONAL STATUS - GENERAL
EATING MEALS: TOTAL
DAILY ACTIVITIY SCORE: 10
SUGGESTED CMS G CODE MODIFIER MOBILITY: CM
PERSONAL GROOMING: A LOT
MOBILITY SCORE: 9
DRESSING REGULAR LOWER BODY CLOTHING: A LOT
SUGGESTED CMS G CODE MODIFIER DAILY ACTIVITY: CL
STANDING UP FROM CHAIR USING ARMS: A LOT
TOILETING: TOTAL
HELP NEEDED FOR BATHING: A LOT
MOVING FROM LYING ON BACK TO SITTING ON SIDE OF FLAT BED: UNABLE
DRESSING REGULAR UPPER BODY CLOTHING: A LOT
WALKING IN HOSPITAL ROOM: A LOT
CLIMB 3 TO 5 STEPS WITH RAILING: TOTAL
MOVING TO AND FROM BED TO CHAIR: UNABLE
TURNING FROM BACK TO SIDE WHILE IN FLAT BAD: A LOT

## 2019-04-26 ASSESSMENT — ENCOUNTER SYMPTOMS
WEAKNESS: 0
MYALGIAS: 0
SHORTNESS OF BREATH: 0
DEPRESSION: 0
VOMITING: 0
FEVER: 0
MYALGIAS: 1
COUGH: 1
DIZZINESS: 0
INSOMNIA: 0
SPEECH CHANGE: 0
BLURRED VISION: 0
CHILLS: 0
SENSORY CHANGE: 0
SORE THROAT: 0
ABDOMINAL PAIN: 0
DIAPHORESIS: 0
HEARTBURN: 0
NAUSEA: 0
HEADACHES: 0
NERVOUS/ANXIOUS: 0
COUGH: 0
BACK PAIN: 1
PHOTOPHOBIA: 0
DIARRHEA: 0
CLAUDICATION: 0
CONSTIPATION: 0

## 2019-04-26 ASSESSMENT — GAIT ASSESSMENTS
ASSISTIVE DEVICE: FRONT WHEEL WALKER
DISTANCE (FEET): 8
GAIT LEVEL OF ASSIST: MODERATE ASSIST

## 2019-04-26 NOTE — THERAPY
"Occupational Therapy Treatment completed with focus on ADLs, ADL transfers, patient education, cognition and upper extremity function.  Functional Status:  Pt was seen for Occupational Therapy treatment today, see Therapy Kardex for details. Treatment included education in breath control with activity and at rest, self pacing techs and energy conservation for pain management. Educated pt in safety awareness techs as well. Psychosocial intervention addressed. Pt appeared less confused today. Agreed to work on self care up in chair at the bedside. Mod A for sit to stand with FWW.  Min A with extended time for UB dressing with cues for sequencing, Max A for LB dressing. Total A for clothing management. Able to doff socks up in chair with Min A, Total A to don.  Mod A for oral hygiene and grooming. Pt SOB with activity needing frequent rest periods.  Pt requested to apply makeup. Pt tried but was unable. Total A for applying makeup which \"Makes me feel better\".  Pt demo Mod A for ADL transfers. BUE's continue to be weak but slightly increasing with attempting more of her self care tasks. Pt gave better effort this OT session with increased encouragement. Pt was left up in chair with PT, call light in reach, chair alarm on and nursing is aware.  Continue Occupational Therapy services as per plan.    Plan of Care: Will benefit from Occupational Therapy 3 times per week  Discharge Recommendations:  Equipment Will Continue to Assess for Equipment Needs. Post-acute therapy Discharge to a transitional care facility for continued skilled therapy services.    See \"Rehab Therapy-Acute\" Patient Summary Report for complete documentation.   "

## 2019-04-26 NOTE — CARE PLAN
Problem: Communication  Goal: The ability to communicate needs accurately and effectively will improve  Outcome: PROGRESSING SLOWER THAN EXPECTED  Hourly rounding in place. Call light within reach. Pt was lethargic throughout shift.     Problem: Skin Integrity  Goal: Risk for impaired skin integrity will decrease  Outcome: PROGRESSING AS EXPECTED  Pt is incontinent. Barrier cream used. q2 turns.

## 2019-04-26 NOTE — PROGRESS NOTES
Pharmacy Kinetics 68 y.o. female on vancomycin day # 33 2019    Currently on Vancomycin 500 mg iv q24hr    Indication for Treatment: Possible brain abscesses/ iliopsoas abscess     Pertinent history per medical record: Admitted on 3/8/2019 for iliopsoas mass and multiple brain lesions. There is concern that this is infectious. All cultures are negative to date, but were all drawn while on antibiotics. CHAS was negative. Patient has a history of breast cancer and DM. ID is following. Plan for completion of abx (6 weeks) in house prior to transfer to [facility] in [place].      Other antibiotics: Meropenem 2g IV q8h     Allergies: Patient has no known allergies.      List concerns for renal function: Age, prolonged vancomycin therapy, BUN:SCr>20:1, albumin 2.5, CT w/contrast , NSAID use, abnormal LFTs     Pertinent cultures to date:   19: wound - right gluteal mass: negative   19: blood - peripheral x 2: negative   19: wound - right buttock: negative   19: blood - peripheral x 2: negative   19: wound - retroperitoneal drain aspirate: negative   19:PBCx2:NGTD     MRSA nares swab if pneumonia is a concern (ordered/positive/negative/n-a): N/A    Recent Labs      19   0026  19   0029  19   0025   WBC  5.0  5.3  4.7*   NEUTSPOLYS  60.50  65.80  49.80     Recent Labs      19   0026  19   0029  19   0025   BUN  18  18  17   CREATININE  0.66  0.52  0.57   ALBUMIN  2.4*  2.5*  2.5*     Recent Labs      19   1946   VANCOTROUGH  24.4*     Intake/Output Summary (Last 24 hours) at 19 1041  Last data filed at 19 1800   Gross per 24 hour   Intake              656 ml   Output                0 ml   Net              656 ml      BP (!) 188/99   Pulse (!) 110   Temp 36.1 °C (97 °F) (Temporal)   Resp 16   Ht 1.524 m (5')   Wt 64.6 kg (142 lb 6.7 oz)   SpO2 94%  Temp (24hrs), Av.7 °C (98.1 °F), Min:36.1 °C (97 °F), Max:37 °C (98.6  °F)    A/P   1. Vancomycin dose change: Continue vancomycin 500 mg IV Q24h (due @ 2100)  2. Next vancomycin level: 4/27 @ 2030  3. Goal trough: 16-20 mcg/mL  4. A/P: Tmax 98.6 without leukocytosis. Hypertensive/tachycardic this AM, however this appears to be a fairly consistent patter for patient every morning. Renal indices stable. Repeat MRI results necessitate extending antibiotic duration by an additional 4 weeks per ID. Repeat MRI week of May 20th. Continue vancomycin 500 mg IV Q24h as outlined above and check a repeat trough on 4/27. Pharmacy to monitor and adjust as needed.     Esther Taylor, PharmD, BCOP

## 2019-04-26 NOTE — DIETARY
Nutrition Services: Update   Day 49 of admit.  Muriel Martini is a 68 y.o. female with admitting DX of metastatic brain cancer, encephalopathy,  Metastatic cancer     Pt is currently on Regular diet.  Wt 64.6 kg via bed scale - (4/20)    Visited pt at bedside. Pt reports she is eating ~50% of meals plus Boost supplements BID. Pt notes that she feels she has to cough/choke when drinking orange juice or water but denies any discomfort from drinking Boost. Pt denies any abdominal discomfort n/v/d/c.     Recommendations/Plan:  1. Rec SLP evaluation to assess discomfort with thin liquid consumption.   2. Encourage intake of meals  3. Document intake of all meals as % taken in ADL's to provide interdisciplinary communication across all shifts.   4. Monitor weight.  5. Nutrition rep will continue to see patient for ongoing meal and snack preferences.    RD available prn

## 2019-04-26 NOTE — PROGRESS NOTES
/69   Pulse 89   Temp 36.9 °C (98.5 °F) (Temporal)   Resp 18   Ht 1.524 m (5')   Wt 64.6 kg (142 lb 6.7 oz)   SpO2 96%   Breastfeeding? No   BMI 27.81 kg/m²     Pt is AOX2. Disoriented to time and situation. Lethargic today. Denies SOB, chest pain, n/v. Pain is 2/10- repositioned pt. Midline in left upper arm is patent with positive blood return. Call light within reach. Bed is locked in lowest position with bed alarm on. POC discussed.

## 2019-04-26 NOTE — CARE PLAN
Problem: Nutritional:  Goal: Achieve adequate nutritional intake  Patient will consume >50% of meals   Outcome: MET Date Met: 04/26/19  Pt consuming ~25-50% meals per ADLs. Pt reports consuming Boost BID to increase intake

## 2019-04-26 NOTE — THERAPY
"Pt w/posterior lean upon standing, requiring verbal cuing and facilitaiton of posture and anterior lean for stability. Pt able to ambulate w/increased stride length, though demonstrates poor sequencing w/walker, requiring physical assist. Pt w/decreased strength and ROM in B LEs. Pt would benefit from furthe racute PT txs to progress towards goals and independence. Recommend post acute placement.    Physical Therapy Treatment completed.   Bed Mobility:  Supine to Sit:  (NT--found up in chair)  Transfers: Sit to Stand: Moderate Assist  Gait: Level Of Assist: Moderate Assist with Front-Wheel Walker       Plan of Care: Will benefit from Physical Therapy 4 times per week    See \"Rehab Therapy-Acute\" Patient Summary Report for complete documentation.       "

## 2019-04-26 NOTE — PROGRESS NOTES
Infectious Disease Progress Note    Author: Ashely Hinojosa M.D. Date & Time of service: 4/26/2019  8:54 AM    Chief Complaint:  Multiple brain lesions  Iliopsoas lesion    Interval History:  68-year-old female with history of diabetes and breast cancer, admitted 3/8/2019 with abdominal pain, iliopsoas lesion, and an abnormal MRI with multiple brain lesions.  4/13/2019 patient is somewhat confused.  No fevers.  No new issues overnight.  4/14/2019 no fevers.  Somewhat confused.  WBC is 4.  4/15/2019 no fevers.  Complains of back pain and joint pain.  WBCs 5.5  4/16/2019 patient was transferred to telemetry overnight.  Much more lethargic.  4/17/2019-no fevers.  Wants to know the plan.  More awake and responsive.  4/18/2019 patient has no fevers.  She is drenching and sweats.  Complains of hip pain.  4/19/2019-low-grade fevers.  No new issues overnight.  Wants to walk.  Complains of pain.  4/20 afebrile WBC 3.9 patient is awake and answering questions appropriately.  She does endorse some pain on the right hip.  CT revealed enhancing fluid collection in the right gluteal muscle consistent with a small abscess and a loculated enhancing fluid collection in the right iliac muscle.  Plan for IR drainage today  4/21 afebrile WBC 4.3 patient transferred to oncology.  She is resting comfortably without any complaint.  Drain was not placed yesterday  4/22 afebrile WBC 4.8 patient is drowsy as she just woke up.  She complains of a cough when she drinks water.  Fluid collections too small for drainage by IR   4/23 afebrile WBC 4.3 patient complaining of back pain.  Denies any hip pain today  4/24 AF WBC 4 complaining of R buttock and back pain. No issues reported overnight per nursing staff. Pt has history of sacral fracture s/p transsacral screw fixation on 12/17/18 4/25 afebrile WBC 5.3 patient very lethargic.  Repeat MRI of the brain reveals decrease in previous brain lesions however there are new abscesses that have  developed.  Iliac muscle abscess not amenable to drainage secondary to signs   afebrile WBC 4.7 patient is sitting up in a chair and answering questions appropriately.  She complains of left hip pain.  She also states she coughs when drinking    labs Reviewed    Review of Systems:  Review of Systems   Constitutional: Positive for malaise/fatigue. Negative for diaphoresis and fever.   HENT: Negative for hearing loss.    Respiratory: Positive for cough. Negative for shortness of breath.    Cardiovascular: Negative for chest pain and leg swelling.   Gastrointestinal: Negative for nausea and vomiting.   Genitourinary: Negative for dysuria.   Musculoskeletal: Positive for back pain and joint pain. Negative for myalgias.        Left hip   Neurological: Negative for sensory change and speech change.   All other systems reviewed and are negative.      Hemodynamics:  Temp (24hrs), Av.9 °C (98.4 °F), Min:36.7 °C (98.1 °F), Max:37 °C (98.6 °F)  Temperature: 37 °C (98.6 °F)  Pulse  Av.5  Min: 59  Max: 153  Blood Pressure : 151/78       Physical Exam:  Physical Exam   Constitutional: She appears well-developed and well-nourished. No distress.   Elderly   HENT:   Head: Normocephalic and atraumatic.   Mouth/Throat: No oropharyngeal exudate.   Eyes: Pupils are equal, round, and reactive to light. EOM are normal. Right eye exhibits no discharge. Left eye exhibits no discharge.   Neck: Neck supple.   Cardiovascular: Normal rate and intact distal pulses.    Murmur heard.  Pulmonary/Chest: Effort normal and breath sounds normal. No respiratory distress. She has no wheezes.   Abdominal: Soft. Bowel sounds are normal. She exhibits no distension. There is no tenderness.   Musculoskeletal: She exhibits no edema, tenderness or deformity.   Right upper extremity PICC line   Lymphadenopathy:     She has no cervical adenopathy.   Neurological: She is alert.   Moves all extremities           Skin: Skin is warm. No rash noted.    Psychiatric:   Flat affect   Nursing note and vitals reviewed.    Meds:    Current Facility-Administered Medications:   •  potassium chloride SA  •  QUEtiapine  •  vancomycin  •  ibuprofen  •  MD Alert...Vancomycin per Pharmacy  •  hydrALAZINE  •  cinacalcet  •  amLODIPine  •  acetaminophen  •  meropenem  •  gabapentin  •  labetalol  •  loperamide  •  losartan  •  Notify provider if pain remains uncontrolled **AND** Use the numeric rating scale (NRS-11) on regular floors and Critical-Care Pain Observation Tool (CPOT) on ICUs/Trauma to assess pain **AND** Pulse Ox (Oximetry) **AND** Pharmacy Consult Request **AND** If patient difficult to arouse and/or has respiratory depression, stop any opiates that are currently infusing and call a Rapid Response. **AND** [DISCONTINUED] oxyCODONE immediate-release **AND** [DISCONTINUED] oxyCODONE immediate-release **AND** [DISCONTINUED] morphine injection  •  omeprazole  •  ondansetron  •  ondansetron  •  pramipexole  •  Respiratory Care per Protocol    Labs:  Recent Labs      04/24/19 0026 04/25/19 0029 04/26/19 0025   WBC  5.0  5.3  4.7*   RBC  4.24  4.41  4.26   HEMOGLOBIN  10.5*  11.2*  10.9*   HEMATOCRIT  34.8*  36.0*  34.6*   MCV  82.1  81.6  81.2*   MCH  24.8*  25.4*  25.6*   RDW  54.1*  53.2*  53.1*   PLATELETCT  143*  135*  117*   MPV  10.3  11.2  10.7   NEUTSPOLYS  60.50  65.80  49.80   LYMPHOCYTES  18.50*  12.30*  23.20   MONOCYTES  3.40  6.10  12.90   EOSINOPHILS  16.80*  14.00*  13.10*   BASOPHILS  0.80  0.90  0.60   RBCMORPHOLO  Normal  Present   --      Recent Labs      04/24/19 0026 04/25/19 0029 04/26/19   0025   SODIUM  134*  138  139   POTASSIUM  3.4*  3.0*  3.0*   CHLORIDE  102  105  107   CO2  25  26  27   GLUCOSE  76  77  80   BUN  18  18  17     Recent Labs      04/24/19 0026 04/25/19 0029 04/26/19   0025   ALBUMIN  2.4*  2.5*  2.5*   TBILIRUBIN  0.5  0.6  0.6   ALKPHOSPHAT  239*  230*  229*   TOTPROTEIN  6.4  6.7  6.6   ALTSGPT  18 19  " 19   ASTSGOT  46*  54*  52*   CREATININE  0.66  0.52  0.57       Imaging:  CT abdomen and pelvis on 4/19/19  1.  Enhancing fluid collection in the right gluteal muscle, appearance compatible with small abscess.  2.  Loculated enhancing fluid collection in the right iliac is muscle, corresponding with site of prior drain, appearance compatible with reaccumulation of abscess.  3.  Fluid-filled distention of small bowel suggests ileus  4.  Atherosclerosis  5.  Trace pericardial effusion      Micro:  Results     Procedure Component Value Units Date/Time    BLOOD CULTURE [[id number]] Collected:  04/18/19 1536    Order Status:  Completed Specimen:  Blood from Peripheral Updated:  04/23/19 1700     Significant Indicator NEG     Source BLD     Site PERIPHERAL     Blood Culture No growth after 5 days of incubation.    Narrative:       Per Hospital Policy: Only change Specimen Src: to \"Line\" if  specified by physician order.    BLOOD CULTURE [[id number]] Collected:  04/18/19 1536    Order Status:  Completed Specimen:  Blood from Peripheral Updated:  04/23/19 1700     Significant Indicator NEG     Source BLD     Site PERIPHERAL     Blood Culture No growth after 5 days of incubation.    Narrative:       Per Hospital Policy: Only change Specimen Src: to \"Line\" if  specified by physician order.          Assessment:  Active Hospital Problems    Diagnosis   • *Lesion of brain [G93.9]   • Mass of iliopsoas muscle group [M62.89]   • Encephalopathy [G93.40]   • History of breast cancer [Z85.3]   • Diabetes mellitus type 2 in obese (HCC) [E11.69, E66.9]       Plan:  Multiple brain lesions-likely abscesses versus metastatic lesions.  Persistent with new lesions noted on MRI on 4/24  Confusion improving and intermittent  MRI with scattered small lesions incl aaron, too small to biopsy per neurosurgery  TTE neg; CHAS neg  Bcxs neg  Repeat CT scan on 3/30/2019 shows decrease in the size of the brain lesions, supporting that these are " abscesses  Repeat MRI brain on 4/24 decrease in size of brain lesions with new abscess formation  Continue meropenem and vancomycin  Monitor renal function and Vanco trough  Last Vanco trough 24.4 on 4/23  Will plan an additional 4 weeks of IV antibiotics  Patient will need repeat MRI prior to completion of antibiotics-repeat MRI brain week of May 20th  Duration of antibiotics pending results of repeat MRI    Iliopsoas abscess, on treatment.  Persistent per imaging on 4/23  CT + 2.6 cm lesion in the right iliopsoas region  S/p aspiration +WBCs-cultures negative to date  Bcxs neg  D/c cefepime 03/26 secondary to confusion  Tolerating Meropenem started 03/26  MRI w/ contrast lumbar, pelvis 03/27 - Iliopsoas and gluteal fluid collections noted.    Drain placed 3/29/2019.  Cultures negative  Another drain was placed on 4/4/2019 and it shows purulent fluid  Repeat CT from 4/8 with resolution of the fluid collections after drainage  Repeat CT on 4/19 revealed right gluteal muscle abscess in addition to recurrent abscess in the right iliac muscle.  Fluid collections too small for drainage by IR  Continue meropenem and vancomycin  Duration of antibiotics clinical  Repeat CT 4/23 - R gluteal fluid collection unchanged; R iliacus fluid collection increased in size now measuring 2.7 cm.  Right iliac is fluid collection not amenable to drainage given size    Type 2 diabetes mellitus  Hemoglobin A1c 6.3  Keep BS under 150 to help control current infection    Altered mental status, improved overall  Appears to be waxing and waning  Multifactorial, likely large contribution from her multiple brain abscesses, monitor  Persistent brain lesions/abscesses noted on repeat MRI on 4/24    H/o breast cancer    I have performed a physical exam and reviewed and updated ROS and plan today 4/26/2019.  In review of yesterday's note 4/25/2019, there are no changes except as documented above.    Disposition: Family and primary care team working  on LTAC in [place]    Discussed with Dr. Keller

## 2019-04-26 NOTE — PROGRESS NOTES
"[facility] Medicine Daily Progress Note    Date of Service  4/26/2019    Chief Complaint  68 y.o. female admitted 3/8/2019 with right hip pain, fever and abnormal brain mri.    Hospital Course    Patient started on empiric antibiotics given fever.  She had abnormal findings on MRI brain and CT showed \"lesion\" on right iliopsoas.  This lesion was biopsied and turned out to be abscess.  She has history of breast cancer and there is also concern of brain lesions being metastatic though their appearance is not typical of this.      Interval Problem Update  3/12 Discussed with Dr Turner for second opinion of brain lesions and based on appearance not able to r/o or r/i any diagnosis.  Given patient's presentation of confusion, hip pain and fever  - this is more supportive of infectious etiology rather than malignant.  Fluid from right gluteal area sent to micro and as of yet - no growth.  Continue zosyn for now.  3/13 Patient with slight improvement in mentation.  Blood cultures and abscess cultures are showing no growth at this time.  TTE being done while I examined patient - no evidence of vegetations on this test.  ID consultation pending - d/w Dr Garcia.  3/14 Patient feeling well today, her pain is present but not as bad as prior to admission.  She was able to follow the conversation today and is agreeable to move forward with the CHAS tomorrow.  I spoke with her brother, René, and updated him on condition yesterday afternoon also.  Will also have PICC placed in next few days as long as blood cultures remain negative as I expect a prolonged antibiotic course of treatment.  3/15 Patient without new complaints, states she needs help to move in bed.  CHAS showed no evidence of vegetations and regular diet resumed.  Culture remains negative and biopsy of brain lesion may prove needed - will watch through the weekend and if mentation does not continue to improve, will discuss with neurosurgery on Monday.  3/16 Patient states " she is okay today, mentation has waxed and waned without significant improvement.  She was febrile earlier today.  No other acute events.  3/17 Patient with significant improvement in mentation today, she is aware of her hospitalization, not falling asleep mid sentence.  Her pain mediations were held most of yesterday and likely far too strong for patient and were affecting her awareness.  Oxycodone 5 discontinued and will use tramadol instead unless pain not well controlled.  CT brain with contrast ordered as a quick scan for comparison to MRI.    3/18 Patient feeling same today, discussed need to discuss with neurosurgery for possible biopsy.  Discussed with Dr Nguyen, he reviewed MRI and CT brain and he feels they are likely metastatic lesions but are far too small to biopsy.  His recommendation is to continue with antibiotics and re-image in 2 weeks to see if any change that would be amenable to biopsy or that would further declare infection.  3/26 Patient mental status continues to wax and wane.  ID ordered MRI lumbar, pelvis and sacrum for evaluation for source of infection, patient with continued low back pain and no real improvement since I saw her 7 days prior with continued antibiotics during this time.  Cultures have not growth pathogen, repeat MRI 3/28.   3/27 Patient somnolent most of the day today, MRI's completed and showing 2 areas of fluid collections, given her history are likely abscesses and will require drainage, CT guided drainage requested and patient NPO at MN for this.  D/w ID - cultures will be done on fluid collection.  Repeat CT brain ordered for tomorrow.  3/28 Patient up to chair, diet resumed as lovenox given this am and drainage of abscesses deferred until tomorrow.  Vanco/merrem to continue and cultures will be collected from abscesses.  3/29 Patient went to IR today, had drain placed in the right buttock into abscess cavity and fluid sent for culture.  Potassium is slightly low,  continue with PO replacement.  HR has improved from yesterday but patient PO intake still not at normal level, increase IVF rate to 100cc/hour.  3/30 Patient without fever since drain placed right buttock.  Purulent material in ÁNGEL bulb. Cultures thus far are still showing no growth.  Antibiotics to continue.  CT head ordered to evaluate change in past 2 weeks as recommended by neurosurgery.  3/31 Patient with fevers overnight - was altered during this time but has recovered.  She denies pain when examined by me.  She still has purulent material in the drainage tubing.  Will continue with current IV antibiotics - 6 weeks total antibiotics suspected - end date would be 4/24/19.  D/w ID.  4/1 Patient remains intermittently somnolent.  She wakes easily but falls back to sleep quickly.  She remained afebrile overnight.  ÁNGEL drain continues to drain purulent material.  ABX through 4/24 - once accepting facility found, patient okay to transfer with midline intact for completion of antibiotics.  4/9 Patient somnolent when examined.  CT showed resolution of fluid collection where ÁNGEL in place.  RN to remove ÁNGEL today.  All cultures still remain without growth.  4/10 Patient seen when working with OT, she is awake but gets off task quickly and is easily fatigued.  ÁNGEL drain removed yesterday.  Patient states she has pain in the lumbar spine when she coughs - just above her surgical area.  She has been in bed for nearly 1 month and this is likely not helping her back pain - educated need for OOB as often as tolerated.  4/11 Patient had bowel incontinence prior to my entering room, states she thinks she needs a bed pan.  CNA notified.  She continues to have low back pain in the area of her surgery and given her bed-bound status during her infection and intermittent confusion, this is not unexpected.  Continuing with therapy recommended and patient is 1:1 feeding given her weakness to feed herself.  4/12 Patient tearful this am, she  is concerned that she will not walk again, support given and educated that she is weak and the inability to walk is only temporary.  She needs to work with therapy and get stronger and I need to stop her narcotics and only give tyelnol/ibuprofen for pain management along with non-narcotic alternatives.  I spoke to patient's brother, René, at her request.  4/13 Patient up to chair when evaluated, mentation improved with discontinuation of narcotics. Brother is coming to visit this weekend and she is looking forward to this.  Ortho recommendations still pending - per ID she spoke to Dr Porter who is consulting Dr Sandra for recommendations.  4/14 Patient up to chair with assistance today.  CT brain remains same as prior 2 weeks ago.  Still pending ortho consultation.  4/15 Patient to be seen by ortho today per Dr Porter.  D/w dietary - patient with weight loss and poor po intake, she needs to be a 1:1 feed patient and if she fails this, will likely need tube feeding to get adequate nutrition.    4/23 Patient up to chair with sister much of the morning and had uncontrolled pain following this.  One time dose of oxycodone given with great relief but patient sleeping since that dose.  Sister is concerned about her breathing as she is only taking small breaths at times, given need for re-imaging of abdomen/pelvis, will add on chest for further evaluation.  Lungs sounded clear on ascultation.  Brother also up visiting with his wife.  Ortho did consult, recommended hardware needs to remain in place for at least 6 months or non-union likely to occur, continue with antibiotics.  4/24 Patient in bed today, much less somnolent than yesterday as no narcotics given.  She complains of pain but still remains lethargic also at times.  Tylenol and ibuprofen to alternate for pain control.  ABX to continue, fluid collections very similar in size when compared to previous imaging - gluteal pocket is 18mm (20mm) and ilacus is  2.7cm from 3.4x2.0cm.  These fluid collections are still to small to drain.  MRI brain is pending.  4/25 Patient with many questions today, she asked why she speaks funny and is hard to understand, why her left leg now hurts, if she will walk again, etc.  She is also having difficulty sleeping at night again - I will increase her seroquel qhs dose from 25 to 37.5mg.  MRI results reviewed with patient, she will need at lease another 4 weeks of antibiotics and a repeat MRI brain around may 24th.  She is clinically stable and okay to transfer to [facility] once accepted and can likely travel via private vehicle as needed.  4/26 Patient awake and alert, she worked with therapies today and is still very weak but motivated to get out of the hospital.  She states the higher dose of seroquel last night did not help much and she only slept 4 hours last night, I will increase dose to 50mg tonight as she did not show signs of too much am sedation from increased dose the night prior.    Consultants/Specialty  ID - Ashish Rogers - s/o    Code Status  full    Disposition  LTAC CA vs NV    Review of Systems  Review of Systems   Constitutional: Negative for chills and fever.   HENT: Negative for congestion and sore throat.    Eyes: Negative for blurred vision and photophobia.   Respiratory: Negative for cough and shortness of breath.    Cardiovascular: Negative for chest pain, claudication and leg swelling.   Gastrointestinal: Negative for abdominal pain, constipation, diarrhea, heartburn, nausea and vomiting.   Genitourinary: Negative for dysuria and hematuria.   Musculoskeletal: Positive for back pain (lumbar/sacral area ) and myalgias (right hip and buttock pain.). Negative for joint pain.   Skin: Negative for itching and rash.   Neurological: Negative for dizziness, sensory change, speech change, weakness and headaches.   Psychiatric/Behavioral: Negative for depression. The patient is not nervous/anxious and does not have insomnia.          Physical Exam  Temp:  [36.1 °C (97 °F)-37 °C (98.6 °F)] 36.1 °C (97 °F)  Pulse:  [] 110  Resp:  [16-20] 16  BP: (136-188)/(64-99) 188/99  SpO2:  [90 %-96 %] 94 %    Physical Exam   Constitutional: She is oriented to person, place, and time. She appears well-developed and well-nourished. No distress.   HENT:   Head: Normocephalic and atraumatic.   Eyes: Conjunctivae are normal. No scleral icterus.   Neck: Neck supple. No JVD present.   Cardiovascular: Normal rate, regular rhythm and normal heart sounds.  Exam reveals no gallop and no friction rub.    No murmur heard.  Pulmonary/Chest: Effort normal and breath sounds normal. No respiratory distress. She exhibits no tenderness.   Abdominal: Soft. Bowel sounds are normal. She exhibits no distension. There is no guarding.   Musculoskeletal: She exhibits no edema or tenderness.          Lymphadenopathy:     She has no cervical adenopathy.   Neurological: She is alert and oriented to person, place, and time. No cranial nerve deficit.   Mentation labile     Skin: Skin is warm and dry. She is not diaphoretic. No erythema. No pallor.   Psychiatric: She has a normal mood and affect. Her behavior is normal.   Nursing note and vitals reviewed.      Fluids    Intake/Output Summary (Last 24 hours) at 04/26/19 1408  Last data filed at 04/25/19 1800   Gross per 24 hour   Intake              420 ml   Output                0 ml   Net              420 ml       Laboratory  Recent Labs      04/24/19   0026  04/25/19   0029  04/26/19   0025   WBC  5.0  5.3  4.7*   RBC  4.24  4.41  4.26   HEMOGLOBIN  10.5*  11.2*  10.9*   HEMATOCRIT  34.8*  36.0*  34.6*   MCV  82.1  81.6  81.2*   MCH  24.8*  25.4*  25.6*   MCHC  30.2*  31.1*  31.5*   RDW  54.1*  53.2*  53.1*   PLATELETCT  143*  135*  117*   MPV  10.3  11.2  10.7     Recent Labs      04/24/19   0026  04/25/19   0029  04/26/19   0025   SODIUM  134*  138  139   POTASSIUM  3.4*  3.0*  3.0*   CHLORIDE  102  105  107   CO2  25 26   27   GLUCOSE  76  77  80   BUN  18  18  17   CREATININE  0.66  0.52  0.57   CALCIUM  10.5  10.1  9.4                   Imaging  MR-BRAIN-WITH & W/O   Final Result      Multiple punctate enhancing lesions seen throughout the supra and infratentorial brain parenchyma. When compared with the previous MRI there has been interval decrease in the size of the lesions. There are also interval reduction in the extent of the    surrounding white matter edema in the restricted diffusion consistent with improvement/treatment response of the most of the multifocal brain abscesses. However there are new enhancing lesions in the left basal ganglia and right cerebellum consistent    with interval new abscesses.Follow-up is recommended to ensure the complete resolution of the enhancing lesions. Please note that the radiological differential diagnosis of this multifocal lesions still includes metastasis. However decrease in the size    of the most of the lesions favors the diagnosis of infection.         CT-CHEST,ABDOMEN,PELVIS WITH   Final Result      1.  Limited evaluation of the thorax due to extensive patient respiratory motion artifact.      2.  Borderline enlarged bilateral axillary lymph nodes.      3.  Linear atelectasis within the lungs with patchy groundglass opacity bilaterally.      4.  Again seen screws extending through the sacrum bilaterally. There is surrounding lucency and fragmentation of the sacrum as well as the right greater than left ilium and a right iliac fracture. It is uncertain if these represent metastatic lesions    with pathologic fracture versus insufficiency fractures.      5.  Fluid collections within the right gluteal and iliacis muscles.. The collection within the right gluteal muscle has unchanged while the fluid collection within the right iliacis muscle is increased somewhat in size. Differential diagnosis includes    hematoma as well as abscess.         6.  Enlarged retroperitoneal, periportal  and portacaval lymph nodes.      DX-CHEST-LIMITED (1 VIEW)   Final Result      No acute cardiac or pulmonary abnormalities are identified. Lung volumes are low.      CT-ABDOMEN-PELVIS WITH   Final Result         1.  Enhancing fluid collection in the right gluteal muscle, appearance compatible with small abscess.   2.  Loculated enhancing fluid collection in the right iliac is muscle, corresponding with site of prior drain, appearance compatible with reaccumulation of abscess.   3.  Fluid-filled distention of small bowel suggests ileus   4.  Atherosclerosis   5.  Trace pericardial effusion      CT-CTA CHEST PULMONARY ARTERY W/ RECONS   Final Result         1.  No large central pulmonary embolus is appreciated, evaluation of the subsegmental branches is essentially nondiagnostic due to motion artifacts. Additional imaging would be required for definitive exclusion of small distal pulmonary emboli.   2.  Trace pericardial effusion   3.  Hepatomegaly   4.  Atherosclerosis.   5.  Right pulmonary nodules, the largest is a 7.7 mm pulmonary nodule, see nodule follow-up recommendations below.      Low Risk: CT at 3-6 months, then consider CT at 18-24 months      High Risk: CT at 3-6 months, then at 18-24 months      Comments: Use most suspicious nodule as guide to management. Follow-up intervals may vary according to size and risk.      Low Risk - Minimal or absent history of smoking and of other known risk factors.      High Risk - History of smoking or of other known risk factors.      Note: These recommendations do not apply to lung cancer screening, patients with immunosuppression, or patients with known primary cancer.      Fleischner Society 2017 Guidelines for Management of Incidentally Detected Pulmonary Nodules in Adults         DX-CHEST-PORTABLE (1 VIEW)   Final Result         1.  Mild pulmonary edema and/or infiltrate   2.  Cardiomegaly      CT-HEAD WITH & W/O   Final Result      1.  No change in scattered  hyperenhancing lesions throughout the brain, cerebellum and visualized brainstem. Some of them demonstrate mild associated edema, similar to prior study.   2.  No CT evidence of infarct or hemorrhage.      CT-NEEDLE BX-MUSCLE RIGHT   Final Result   Addendum 1 of 1   Addendum:   The biopsy/drainage was done utilizing low dose optimization CT techniques    including auto modulation for  imaging and low mA CT/fluoroscopy mode    for the procedure.      Final      CT-guided aspiration of pus like material in the right gluteal muscle lesion.      CT-ABDOMEN-PELVIS WITH   Final Result      1.  Resolution of right iliacus abscess with no associated fluid adjacent to the drainage catheter      2.  Interval removal of right gluteal drainage catheter      3.  Surgical screw traversing both sacroiliac joint with associated pathologic fracture of the right ilium and right medial sacrum      4.  Hepatomegaly      CT-DRAIN-HEMATOMA - SEROMA   Final Result      1.  CT-GUIDED RETROPERITONEAL (EXTRAPERITONEAL) RIGHT PELVIC ABSCESS DRAINAGE AT THE RIGHT ILIOPSOAS. ORDERS WERE WRITTEN FOR ONCE DAILY IRRIGATION OF THE CATHETER WITH 5 ML STERILE SALINE.      2.  THE CURRENT PLAN IS TO MONITOR DRAINAGE OUTPUT AND OBTAIN A FOLLOWUP CT SCAN IN 5-7 DAYS IF CLINICALLY INDICATED.      CT-ABDOMEN-PELVIS WITH   Final Result      1.  Rim-enhancing fluid collection in the right iliopsoas muscle may have increased in size slightly from the prior exam.      2.  Pigtail catheter in the right gluteus medius muscle. No residual fluid collection is appreciated.      3.  Pathologic fracture of the right ilium. Status post right SI joint fusion.      4.  Atherosclerosis.      5.  Cholelithiasis.      CT-HEAD WITH & W/O   Final Result      1.  Interval decrease in size and level of enhancement of multiple small punctate foci in both cerebral hemispheres and in the midbrain consistent with improving septic emboli.      2.  No hemorrhage or mass  effect.      CT-DRAIN-RETROPERITONEAL   Final Result      1.  CT GUIDED CATHETER DRAINAGE OF RIGHT GLUTEAL ABSCESS.   2.  THE CURRENT PLAN IS TO OBTAIN A FOLLOW-UP CT SCAN IN 5-7 DAYS IF INDICATED. ORDERS WERE WRITTEN FOR ONCE DAILY IRRIGATION OF THE CATHETER WITH 5 ML STERILE SALINE FOR 3 DAYS.   3. THE ILIOPSOAS COLLECTION WAS NOT AMENABLE TO CATHETER DRAINAGE AS INTERVENING BOWEL AND/OR BONY STRUCTURES OBSTRUCT A PATHWAY TO THIS COLLECTION AT THIS TIME.      MR-LUMBAR SPINE-WITH & W/O   Final Result      1.  There are multifocal enhancing fluid collections in the right iliopsoas muscles and gluteus muscles adjacent to the right ilium. Right iliopsoas fluid collection measures an approximately 3.9 x 2.9 cm. The right gluteal fluid collection measures an    approximately 3.5 x 2.8 cm in size. The differential diagnosis includes postsurgical seroma and abscess.   2.  Post operative and degenerative changes as described above.      MR-PELVIS-WITH & W/O AND SEQUENCES   Final Result      1.  Surgical screw traverses both sacroiliac joint across presumed pathologic fracture of the right sacrum and the hardware obscures the adjacent tissues.      2.  No other evidence for bony metastatic disease      3.  Right gluteal musculature rim-enhancing fluid collection measuring 3.4 x 2.8 cm. This could be a postoperative seroma versus abscess      4.  osteoarthritis of both hip joint      5.  Prior hysterectomy      IR-MIDLINE CATHETER INSERTION WO GUIDANCE > AGE 3   Final Result                  Ultrasound-guided midline placement performed by qualified nursing staff    as above.          CT-HEAD WITH   Final Result      Multiple subcentimeter too numerous to count enhancing masses scattered throughout the supratentorial and infratentorial brain. These demonstrate some surrounding vasogenic edema and most likely represent multifocal metastatic lesions. Other    considerations would include multifocal abscesses or septic emboli.       EC-CHAS W/O CONT   Final Result      EC-CHAS W/O CONT   Final Result      EC-ECHOCARDIOGRAM COMPLETE W/O CONT   Final Result      OUTSIDE IMAGES-CT CHEST/ABDOMEN/PELVIS   Final Result      OUTSIDE IMAGES-DX CHEST   Final Result      [id number] FILM X-RAY   Final Result      OUTSIDE IMAGES-MR BRAIN   Final Result      OUTSIDE IMAGES-MR BRAIN   Final Result           Assessment/Plan  * Lesion of brain   Assessment & Plan    IR Bx of psoas muscle ordered - was abscess  Continued treatment of infection  TTE and CHAS negative for vegetations.  MRI brain to repeat  Palliative consulted.  Vanco/merrem per ID  MRI brain 4/24 showing improvement old lesions but new lesions in right cerebellum and left basal ganglion  MRI brain due in 1 month for re-evaluation of duration of antibiotics, currently plan to continue for at least 4 more weeks.         Encephalopathy   Assessment & Plan    Waxes and wanes.  Likely secondary to multiple brain lesions.  Avoid benzodiazepines and/or anticholinergic medications.  Avoid sedatives or hypnotics.  At baseline for now.       Mass of iliopsoas muscle group   Assessment & Plan    Return of fluid collection, repeat drainage in IR with cultures, drain placed 3/29 - 10ml purulent material drained and sent for culture - no growth a/o 3/30  Was on linezolid and cefepime and now on vanco/merrem - end date to be > 6 weeks of treatment as now clinical resolution needed  ID following   CHAS negative for endocarditis.  Patient has sacral screws, Orthopedic surgery recommended hardware to stay for at least 6 months to promote adequate healing and to continue with antibiotics for now.  4/20: Patient did not qualify for IR aspiration and drain placement for the above-mentioned abscesses as they were too small.  repeat CT abdomen/pelvis 4/23 - fluid collections still too small to drain  MRI brain - old lesions improving but unfortunately new lesions right cerebellum and left basal ganglion.        Pancytopenia (HCC)   Assessment & Plan    Could be due to toxic effects of infection on bone marrow.  Afebrile.  ANC within normal limits.  Continue to monitor.  Gradually improving.  Resolved although normal white blood cell count and normal ANC.  Mild thrombocytopenia.       Hyponatremia- (present on admission)   Assessment & Plan    Mildly low - doubt contribution to mentation         RLS (restless legs syndrome)- (present on admission)   Assessment & Plan    Pramipexole       Diabetes mellitus type 2 in obese ([facility])- (present on admission)   Assessment & Plan    Well controlled   Insulin sliding scale with hypoglycemia protocol.  Continue to monitor       History of breast cancer- (present on admission)   Assessment & Plan    Status post mastectomy.  Repeat CT showed decrease in size with antibiotics wishes consistent with abscesses rather than metastasis.  Continue IV antibiotis  Appreciate infectious disease recommendations.       COPD (chronic obstructive pulmonary disease) (HCC)- (present on admission)   Assessment & Plan    Currently maintaining saturation on room air.  Respiratory therapy per protocol.     Insomnia   Assessment & Plan    seroquel qhs 50mg       Hypercalcemia- (present on admission)   Assessment & Plan    Mild   Continue with Sensipar.  Monitor and adjust Sensipar dose accordingly.       Sinus tachycardia- (present on admission)   Assessment & Plan    Likely due to combination of infection and volume loss.  Echocardiogram with normal ejection fraction and no significant valvular abnormalities.  Tachycardia has resolved     Urinary tract infection   Assessment & Plan    Culture remain no growth.       Fungal infection of the groin   Assessment & Plan    Resolved         History of deep vein thrombosis- (present on admission)   Assessment & Plan    History of deep vein thrombosis   Was on Rivaroxaban as outpatient.     Continues to be on hold as interventions often with drain  placement/removal  Start prophylactic lovenox once no further interventions needed.           Essential hypertension- (present on admission)   Assessment & Plan    Restarted on losartan and furosemide with hold parameters.  Continue to monitor     Fever   Assessment & Plan    PRN Tylenol.  Continue monitor     GERD (gastroesophageal reflux disease)- (present on admission)   Assessment & Plan    Omeprazole     Chronic pain- (present on admission)   Assessment & Plan     Multifactorial  Likely secondary to abscess, fracture, bed bound status  Pain control, avoid narcotics given severe impact on patient's mentation.              VTE prophylaxis: scds

## 2019-04-27 PROBLEM — R13.10 DYSPHAGIA: Status: ACTIVE | Noted: 2019-04-27

## 2019-04-27 LAB
ALBUMIN SERPL BCP-MCNC: 2.6 G/DL (ref 3.2–4.9)
ALBUMIN/GLOB SERPL: 0.7 G/DL
ALP SERPL-CCNC: 304 U/L (ref 30–99)
ALT SERPL-CCNC: 24 U/L (ref 2–50)
ANION GAP SERPL CALC-SCNC: 5 MMOL/L (ref 0–11.9)
AST SERPL-CCNC: 60 U/L (ref 12–45)
BASOPHILS # BLD AUTO: 0.5 % (ref 0–1.8)
BASOPHILS # BLD: 0.03 K/UL (ref 0–0.12)
BILIRUB SERPL-MCNC: 0.6 MG/DL (ref 0.1–1.5)
BUN SERPL-MCNC: 15 MG/DL (ref 8–22)
CALCIUM SERPL-MCNC: 9.1 MG/DL (ref 8.5–10.5)
CHLORIDE SERPL-SCNC: 105 MMOL/L (ref 96–112)
CO2 SERPL-SCNC: 26 MMOL/L (ref 20–33)
CREAT SERPL-MCNC: 0.54 MG/DL (ref 0.5–1.4)
EOSINOPHIL # BLD AUTO: 0.68 K/UL (ref 0–0.51)
EOSINOPHIL NFR BLD: 11.6 % (ref 0–6.9)
ERYTHROCYTE [DISTWIDTH] IN BLOOD BY AUTOMATED COUNT: 54.6 FL (ref 35.9–50)
GLOBULIN SER CALC-MCNC: 3.9 G/DL (ref 1.9–3.5)
GLUCOSE SERPL-MCNC: 72 MG/DL (ref 65–99)
HCT VFR BLD AUTO: 35.9 % (ref 37–47)
HGB BLD-MCNC: 11.3 G/DL (ref 12–16)
IMM GRANULOCYTES # BLD AUTO: 0.02 K/UL (ref 0–0.11)
IMM GRANULOCYTES NFR BLD AUTO: 0.3 % (ref 0–0.9)
LYMPHOCYTES # BLD AUTO: 0.61 K/UL (ref 1–4.8)
LYMPHOCYTES NFR BLD: 10.4 % (ref 22–41)
MCH RBC QN AUTO: 26.2 PG (ref 27–33)
MCHC RBC AUTO-ENTMCNC: 31.5 G/DL (ref 33.6–35)
MCV RBC AUTO: 83.1 FL (ref 81.4–97.8)
MONOCYTES # BLD AUTO: 0.65 K/UL (ref 0–0.85)
MONOCYTES NFR BLD AUTO: 11.1 % (ref 0–13.4)
NEUTROPHILS # BLD AUTO: 3.88 K/UL (ref 2–7.15)
NEUTROPHILS NFR BLD: 66.1 % (ref 44–72)
NRBC # BLD AUTO: 0.02 K/UL
NRBC BLD-RTO: 0.3 /100 WBC
PLATELET # BLD AUTO: 125 K/UL (ref 164–446)
PMV BLD AUTO: 11.8 FL (ref 9–12.9)
POTASSIUM SERPL-SCNC: 3.2 MMOL/L (ref 3.6–5.5)
PROT SERPL-MCNC: 6.5 G/DL (ref 6–8.2)
RBC # BLD AUTO: 4.32 M/UL (ref 4.2–5.4)
SODIUM SERPL-SCNC: 136 MMOL/L (ref 135–145)
VANCOMYCIN TROUGH SERPL-MCNC: 14.4 UG/ML (ref 10–20)
WBC # BLD AUTO: 5.9 K/UL (ref 4.8–10.8)

## 2019-04-27 PROCEDURE — 99232 SBSQ HOSP IP/OBS MODERATE 35: CPT | Performed by: INTERNAL MEDICINE

## 2019-04-27 PROCEDURE — A9270 NON-COVERED ITEM OR SERVICE: HCPCS | Performed by: INTERNAL MEDICINE

## 2019-04-27 PROCEDURE — 700105 HCHG RX REV CODE 258: Performed by: INTERNAL MEDICINE

## 2019-04-27 PROCEDURE — 770009 HCHG ROOM/CARE - ONCOLOGY SEMI PRI*

## 2019-04-27 PROCEDURE — 80053 COMPREHEN METABOLIC PANEL: CPT

## 2019-04-27 PROCEDURE — 700111 HCHG RX REV CODE 636 W/ 250 OVERRIDE (IP): Performed by: FAMILY MEDICINE

## 2019-04-27 PROCEDURE — 92610 EVALUATE SWALLOWING FUNCTION: CPT

## 2019-04-27 PROCEDURE — 700102 HCHG RX REV CODE 250 W/ 637 OVERRIDE(OP): Performed by: FAMILY MEDICINE

## 2019-04-27 PROCEDURE — 700111 HCHG RX REV CODE 636 W/ 250 OVERRIDE (IP): Performed by: INTERNAL MEDICINE

## 2019-04-27 PROCEDURE — 36415 COLL VENOUS BLD VENIPUNCTURE: CPT

## 2019-04-27 PROCEDURE — A9270 NON-COVERED ITEM OR SERVICE: HCPCS | Performed by: FAMILY MEDICINE

## 2019-04-27 PROCEDURE — 700105 HCHG RX REV CODE 258

## 2019-04-27 PROCEDURE — 80202 ASSAY OF VANCOMYCIN: CPT

## 2019-04-27 PROCEDURE — 85025 COMPLETE CBC W/AUTO DIFF WBC: CPT

## 2019-04-27 PROCEDURE — 700102 HCHG RX REV CODE 250 W/ 637 OVERRIDE(OP): Performed by: INTERNAL MEDICINE

## 2019-04-27 RX ORDER — SODIUM CHLORIDE 9 MG/ML
INJECTION, SOLUTION INTRAVENOUS
Status: COMPLETED
Start: 2019-04-27 | End: 2019-04-27

## 2019-04-27 RX ORDER — MAGNESIUM SULFATE 1 G/100ML
1 INJECTION INTRAVENOUS ONCE
Status: DISCONTINUED | OUTPATIENT
Start: 2019-04-27 | End: 2019-04-27

## 2019-04-27 RX ORDER — KETOROLAC TROMETHAMINE 30 MG/ML
15 INJECTION, SOLUTION INTRAMUSCULAR; INTRAVENOUS EVERY 6 HOURS PRN
Status: DISPENSED | OUTPATIENT
Start: 2019-04-27 | End: 2019-04-30

## 2019-04-27 RX ADMIN — HYDRALAZINE HYDROCHLORIDE 20 MG: 20 INJECTION INTRAMUSCULAR; INTRAVENOUS at 13:07

## 2019-04-27 RX ADMIN — KETOROLAC TROMETHAMINE 15 MG: 30 INJECTION, SOLUTION INTRAMUSCULAR at 19:54

## 2019-04-27 RX ADMIN — GABAPENTIN 300 MG: 300 CAPSULE ORAL at 08:30

## 2019-04-27 RX ADMIN — VANCOMYCIN HYDROCHLORIDE 500 MG: 100 INJECTION, POWDER, LYOPHILIZED, FOR SOLUTION INTRAVENOUS at 22:45

## 2019-04-27 RX ADMIN — PRAMIPEXOLE DIHYDROCHLORIDE 0.25 MG: 0.25 TABLET ORAL at 15:14

## 2019-04-27 RX ADMIN — MEROPENEM 2 G: 1 INJECTION, POWDER, FOR SOLUTION INTRAVENOUS at 12:25

## 2019-04-27 RX ADMIN — ACETAMINOPHEN 650 MG: 325 TABLET, FILM COATED ORAL at 13:58

## 2019-04-27 RX ADMIN — MEROPENEM 2 G: 1 INJECTION, POWDER, FOR SOLUTION INTRAVENOUS at 03:27

## 2019-04-27 RX ADMIN — IBUPROFEN 600 MG: 400 TABLET, FILM COATED ORAL at 08:30

## 2019-04-27 RX ADMIN — SODIUM CHLORIDE 500 ML: 9 INJECTION, SOLUTION INTRAVENOUS at 00:33

## 2019-04-27 RX ADMIN — QUETIAPINE FUMARATE 50 MG: 25 TABLET ORAL at 18:05

## 2019-04-27 RX ADMIN — MEROPENEM 2 G: 1 INJECTION, POWDER, FOR SOLUTION INTRAVENOUS at 19:09

## 2019-04-27 RX ADMIN — IBUPROFEN 600 MG: 400 TABLET, FILM COATED ORAL at 23:13

## 2019-04-27 ASSESSMENT — ENCOUNTER SYMPTOMS
BLURRED VISION: 0
SHORTNESS OF BREATH: 0
HEARTBURN: 0
MYALGIAS: 0
BACK PAIN: 1
DIARRHEA: 0
DEPRESSION: 0
NAUSEA: 0
DIAPHORESIS: 0
SPEECH CHANGE: 0
MYALGIAS: 1
COUGH: 0
FEVER: 0
HEADACHES: 0
CHILLS: 0
INSOMNIA: 0
CLAUDICATION: 0
WEAKNESS: 0
SORE THROAT: 0
ABDOMINAL PAIN: 0
CONSTIPATION: 0
VOMITING: 0
NERVOUS/ANXIOUS: 0
PHOTOPHOBIA: 0
DIZZINESS: 0
SENSORY CHANGE: 0

## 2019-04-27 NOTE — PROGRESS NOTES
Assessment complete.  AA&Ox2. RA.   Pain to left hip tolerable per pt.   Pt aspirated earlier in day shift per day RN. SLP eval in place.  Pt incontinent of bladder several times this shift. +BM.  Pt pivots to chair with assist x2.   Midline dressing change complete. Running TKO and IV abx.  All needs met at this time. Call light within reach.

## 2019-04-27 NOTE — PROGRESS NOTES
BP (!) 162/81   Pulse (!) 118   Temp 37.4 °C (99.4 °F) (Temporal)   Resp 18   Ht 1.524 m (5')   Wt 64.6 kg (142 lb 6.7 oz)   SpO2 95%   Breastfeeding? No   BMI 27.81 kg/m²   Received report and assumed care of pt at 0700. Pt is A&O x3. Pt disoriented to time. Pt complains of pain to pelvic area, medication given for pain see MAR. Pt is incontinent of urine and stool. Fall precautions are in place. Bed is in lowest position and call light is within reach. Hourly rounding is in place.

## 2019-04-27 NOTE — PROGRESS NOTES
Infectious Disease Progress Note    Author: Ashely Hinojosa M.D. Date & Time of service: 4/27/2019  9:28 AM    Chief Complaint:  Multiple brain lesions  Iliopsoas lesion    Interval History:  68-year-old female with history of diabetes and breast cancer, admitted 3/8/2019 with abdominal pain, iliopsoas lesion, and an abnormal MRI with multiple brain lesions.  4/13/2019 patient is somewhat confused.  No fevers.  No new issues overnight.  4/14/2019 no fevers.  Somewhat confused.  WBC is 4.  4/15/2019 no fevers.  Complains of back pain and joint pain.  WBCs 5.5  4/16/2019 patient was transferred to telemetry overnight.  Much more lethargic.  4/17/2019-no fevers.  Wants to know the plan.  More awake and responsive.  4/18/2019 patient has no fevers.  She is drenching and sweats.  Complains of hip pain.  4/19/2019-low-grade fevers.  No new issues overnight.  Wants to walk.  Complains of pain.  4/20 afebrile WBC 3.9 patient is awake and answering questions appropriately.  She does endorse some pain on the right hip.  CT revealed enhancing fluid collection in the right gluteal muscle consistent with a small abscess and a loculated enhancing fluid collection in the right iliac muscle.  Plan for IR drainage today  4/21 afebrile WBC 4.3 patient transferred to oncology.  She is resting comfortably without any complaint.  Drain was not placed yesterday  4/22 afebrile WBC 4.8 patient is drowsy as she just woke up.  She complains of a cough when she drinks water.  Fluid collections too small for drainage by IR   4/23 afebrile WBC 4.3 patient complaining of back pain.  Denies any hip pain today  4/24 AF WBC 4 complaining of R buttock and back pain. No issues reported overnight per nursing staff. Pt has history of sacral fracture s/p transsacral screw fixation on 12/17/18 4/25 afebrile WBC 5.3 patient very lethargic.  Repeat MRI of the brain reveals decrease in previous brain lesions however there are new abscesses that have  developed.  Iliac muscle abscess not amenable to drainage secondary to signs   afebrile WBC 4.7 patient is sitting up in a chair and answering questions appropriately.  She complains of left hip pain.  She also states she coughs when drinking   afebrile WBC 5.9 patient continues to complain of left hip pain.  She thinks she is going to have a repeat CT today but there is not ordered    labs Reviewed    Review of Systems:  Review of Systems   Constitutional: Positive for malaise/fatigue. Negative for diaphoresis and fever.   HENT: Negative for hearing loss.    Respiratory: Negative for cough and shortness of breath.    Cardiovascular: Negative for chest pain and leg swelling.   Gastrointestinal: Negative for nausea and vomiting.   Genitourinary: Negative for dysuria.   Musculoskeletal: Positive for back pain and joint pain. Negative for myalgias.        Left hip   Neurological: Negative for sensory change and speech change.   All other systems reviewed and are negative.      Hemodynamics:  Temp (24hrs), Av.1 °C (98.8 °F), Min:36.9 °C (98.4 °F), Max:37.4 °C (99.4 °F)  Temperature: 37.4 °C (99.4 °F)  Pulse  Av.6  Min: 59  Max: 153  Blood Pressure : (!) 162/81       Physical Exam:  Physical Exam   Constitutional: She appears well-developed and well-nourished. No distress.   Elderly   HENT:   Head: Normocephalic and atraumatic.   Mouth/Throat: No oropharyngeal exudate.   Eyes: Pupils are equal, round, and reactive to light. EOM are normal. Right eye exhibits no discharge. Left eye exhibits no discharge.   Neck: Neck supple.   Cardiovascular: Normal rate and intact distal pulses.    Murmur heard.  Pulmonary/Chest: Effort normal and breath sounds normal. No respiratory distress. She has no wheezes.   Abdominal: Soft. Bowel sounds are normal. She exhibits no distension. There is no tenderness.   Musculoskeletal: She exhibits no edema, tenderness or deformity.   Right upper extremity PICC line    Lymphadenopathy:     She has no cervical adenopathy.   Neurological: She is alert.   Moves all extremities           Skin: Skin is warm. No rash noted.   Psychiatric:   Flat affect   Nursing note and vitals reviewed.    Meds:    Current Facility-Administered Medications:   •  QUEtiapine  •  potassium chloride SA  •  vancomycin  •  ibuprofen  •  MD Alert...Vancomycin per Pharmacy  •  hydrALAZINE  •  cinacalcet  •  amLODIPine  •  acetaminophen  •  meropenem  •  gabapentin  •  labetalol  •  loperamide  •  losartan  •  Notify provider if pain remains uncontrolled **AND** Use the numeric rating scale (NRS-11) on regular floors and Critical-Care Pain Observation Tool (CPOT) on ICUs/Trauma to assess pain **AND** Pulse Ox (Oximetry) **AND** Pharmacy Consult Request **AND** If patient difficult to arouse and/or has respiratory depression, stop any opiates that are currently infusing and call a Rapid Response. **AND** [DISCONTINUED] oxyCODONE immediate-release **AND** [DISCONTINUED] oxyCODONE immediate-release **AND** [DISCONTINUED] morphine injection  •  omeprazole  •  ondansetron  •  ondansetron  •  pramipexole  •  Respiratory Care per Protocol    Labs:  Recent Labs      04/25/19 0029 04/26/19   0025  04/27/19   0032   WBC  5.3  4.7*  5.9   RBC  4.41  4.26  4.32   HEMOGLOBIN  11.2*  10.9*  11.3*   HEMATOCRIT  36.0*  34.6*  35.9*   MCV  81.6  81.2*  83.1   MCH  25.4*  25.6*  26.2*   RDW  53.2*  53.1*  54.6*   PLATELETCT  135*  117*  125*   MPV  11.2  10.7  11.8   NEUTSPOLYS  65.80  49.80  66.10   LYMPHOCYTES  12.30*  23.20  10.40*   MONOCYTES  6.10  12.90  11.10   EOSINOPHILS  14.00*  13.10*  11.60*   BASOPHILS  0.90  0.60  0.50   RBCMORPHOLO  Present   --    --      Recent Labs      04/25/19   0029  04/26/19   0025  04/27/19 0032   SODIUM  138  139  136   POTASSIUM  3.0*  3.0*  3.2*   CHLORIDE  105  107  105   CO2  26  27  26   GLUCOSE  77  80  72   BUN  18  17  15     Recent Labs      04/25/19   0029  04/26/19    "0025  04/27/19   0032   ALBUMIN  2.5*  2.5*  2.6*   TBILIRUBIN  0.6  0.6  0.6   ALKPHOSPHAT  230*  229*  304*   TOTPROTEIN  6.7  6.6  6.5   ALTSGPT  19 19 24   ASTSGOT  54*  52*  60*   CREATININE  0.52  0.57  0.54       Imaging:  CT abdomen and pelvis on 4/19/19  1.  Enhancing fluid collection in the right gluteal muscle, appearance compatible with small abscess.  2.  Loculated enhancing fluid collection in the right iliac is muscle, corresponding with site of prior drain, appearance compatible with reaccumulation of abscess.  3.  Fluid-filled distention of small bowel suggests ileus  4.  Atherosclerosis  5.  Trace pericardial effusion      Micro:  Results     Procedure Component Value Units Date/Time    BLOOD CULTURE [[id number]] Collected:  04/18/19 1536    Order Status:  Completed Specimen:  Blood from Peripheral Updated:  04/23/19 1700     Significant Indicator NEG     Source BLD     Site PERIPHERAL     Blood Culture No growth after 5 days of incubation.    Narrative:       Per Hospital Policy: Only change Specimen Src: to \"Line\" if  specified by physician order.    BLOOD CULTURE [[id number]] Collected:  04/18/19 1536    Order Status:  Completed Specimen:  Blood from Peripheral Updated:  04/23/19 1700     Significant Indicator NEG     Source BLD     Site PERIPHERAL     Blood Culture No growth after 5 days of incubation.    Narrative:       Per Hospital Policy: Only change Specimen Src: to \"Line\" if  specified by physician order.          Assessment:  Active Hospital Problems    Diagnosis   • *Lesion of brain [G93.9]   • Mass of iliopsoas muscle group [M62.89]   • Encephalopathy [G93.40]   • History of breast cancer [Z85.3]   • Diabetes mellitus type 2 in obese (HCC) [E11.69, E66.9]       Plan:  Multiple brain lesions-likely abscesses versus metastatic lesions.  Persistent with new lesions noted on MRI on 4/24  Confusion improving and intermittent  MRI with scattered small lesions incl aaron, too small to biopsy " per neurosurgery  TTE neg; CHAS neg  Bcxs neg  Repeat CT scan on 3/30/2019 shows decrease in the size of the brain lesions, supporting that these are abscesses  Repeat MRI brain on 4/24 decrease in size of brain lesions with new abscess formation  Continue meropenem and vancomycin  Monitor renal function and Vanco trough  Last Vanco trough 24.4 on 4/23  Will plan an additional 4 weeks of IV antibiotics  Patient will need repeat MRI prior to completion of antibiotics-repeat MRI brain week of May 20th  Duration of antibiotics pending results of repeat MRI    Iliopsoas abscess, on treatment.  Persistent per imaging on 4/23  CT + 2.6 cm lesion in the right iliopsoas region  S/p aspiration +WBCs-cultures negative to date  Bcxs neg  D/c cefepime 03/26 secondary to confusion  Tolerating Meropenem started 03/26  MRI w/ contrast lumbar, pelvis 03/27 - Iliopsoas and gluteal fluid collections noted.    Drain placed 3/29/2019.  Cultures negative  Another drain was placed on 4/4/2019 and it shows purulent fluid  Repeat CT from 4/8 with resolution of the fluid collections after drainage  Repeat CT on 4/19 revealed right gluteal muscle abscess in addition to recurrent abscess in the right iliac muscle.  Fluid collections too small for drainage by IR  Continue meropenem and vancomycin  Duration of antibiotics clinical  Repeat CT 4/23 - R gluteal fluid collection unchanged; R iliacus fluid collection increased in size now measuring 2.7 cm.  Right iliac fluid collection not amenable to drainage given size    Type 2 diabetes mellitus  Hemoglobin A1c 6.3  Keep BS under 150 to help control current infection    Altered mental status, improved overall  Appears to be waxing and waning  Multifactorial, likely large contribution from her multiple brain abscesses, monitor  Persistent brain lesions/abscesses noted on repeat MRI on 4/24    H/o breast cancer    I have performed a physical exam and reviewed and updated ROS and plan today  4/27/2019.  In review of yesterday's note 4/26/2019, there are no changes except as documented above.    Disposition: Family and primary care team working on LTAC in [place]    Discussed with Dr. Keller.  ID signing off.  Please reconsult after repeat MRI brain performed.

## 2019-04-27 NOTE — PROGRESS NOTES
Pharmacy Kinetics 68 y.o. female on vancomycin day # 34 2019    Currently on Vancomycin 500 mg iv q24hr    Indication for Treatment: Possible brain abscesses/ iliopsoas abscess     Pertinent history per medical record: Admitted on 3/8/2019 for iliopsoas mass and multiple brain lesions. There is concern that this is infectious. All cultures are negative to date, but were all drawn while on antibiotics. CHAS was negative. Patient has a history of breast cancer and DM. ID is following. Plan for completion of abx (6 weeks) in house prior to transfer to [facility] in [place].      Other antibiotics: Meropenem 2g IV q8h     Allergies: Patient has no known allergies.      List concerns for renal function: Age, prolonged vancomycin therapy, BUN:SCr>20:1, albumin 2.6, CT w/contrast , NSAID use, abnormal LFTs     Pertinent cultures to date:   19: wound - right gluteal mass: negative   19: blood - peripheral x 2: negative   19: wound - right buttock: negative   19: blood - peripheral x 2: negative   19: wound - retroperitoneal drain aspirate: negative   19:PBCx2:NGTD     MRSA nares swab if pneumonia is a concern (ordered/positive/negative/n-a): N/A    Recent Labs      19   0029  19   0025  19   0032   WBC  5.3  4.7*  5.9   NEUTSPOLYS  65.80  49.80  66.10     Recent Labs      19   0029  19   0025  19   0032   BUN  18  17  15   CREATININE  0.52  0.57  0.54   ALBUMIN  2.5*  2.5*  2.6*     BP (!) 162/81   Pulse (!) 118   Temp 37.4 °C (99.4 °F) (Temporal)   Resp 18   Ht 1.524 m (5')   Wt 64.6 kg (142 lb 6.7 oz)   SpO2 95%  Temp (24hrs), Av.1 °C (98.8 °F), Min:36.9 °C (98.4 °F), Max:37.4 °C (99.4 °F)    A/P   1. Vancomycin dose change: Continue vancomycin 500 mg IV Q24h (due @ )  2. Next vancomycin level: today @   3. Goal trough: 16-20 mcg/mL  4. A/P: Tmax 99.4 without leukocytosis. Hypertensive/tachycardic this AM, however this appears to  "be a fairly consistent pattern for patient in the morning. Renal indices stable. No s/s of vancomycin toxicity present at this time. MRI brain 4/24 shows \"new enhancing lesions in the left basal ganglia and right cerebellum consistent with interval new abscesses..\" Antibiotics extended an additional four weeks. Anticipate repeat MRI brain week of May 20th per ID. ID signed off. DC planning to LTAC. Continue vancomycin 500 mg IV Q24h as outlined above. Repeat trough tonight. Pharmacy to monitor and adjust as needed.     Esther Taylor, PharmD, BCOP      "

## 2019-04-27 NOTE — PROGRESS NOTES
"[facility] Daily Progress Note    Date of Service  4/27/2019    Chief Complaint  68 y.o. female admitted 3/8/2019 with right hip pain, fever and abnormal brain mri.    Hospital Course    Patient started on empiric antibiotics given fever.  She had abnormal findings on MRI brain and CT showed \"lesion\" on right iliopsoas.  This lesion was biopsied and turned out to be abscess.  She has history of breast cancer and there is also concern of brain lesions being metastatic though their appearance is not typical of this.      Interval Problem Update  3/12 Discussed with Dr Turner for second opinion of brain lesions and based on appearance not able to r/o or r/i any diagnosis.  Given patient's presentation of confusion, hip pain and fever  - this is more supportive of infectious etiology rather than malignant.  Fluid from right gluteal area sent to micro and as of yet - no growth.  Continue zosyn for now.  3/13 Patient with slight improvement in mentation.  Blood cultures and abscess cultures are showing no growth at this time.  TTE being done while I examined patient - no evidence of vegetations on this test.  ID consultation pending - d/w Dr Garcia.  3/14 Patient feeling well today, her pain is present but not as bad as prior to admission.  She was able to follow the conversation today and is agreeable to move forward with the CHAS tomorrow.  I spoke with her brother, René, and updated him on condition yesterday afternoon also.  Will also have PICC placed in next few days as long as blood cultures remain negative as I expect a prolonged antibiotic course of treatment.  3/15 Patient without new complaints, states she needs help to move in bed.  CHAS showed no evidence of vegetations and regular diet resumed.  Culture remains negative and biopsy of brain lesion may prove needed - will watch through the weekend and if mentation does not continue to improve, will discuss with neurosurgery on Monday.  3/16 Patient states " she is okay today, mentation has waxed and waned without significant improvement.  She was febrile earlier today.  No other acute events.  3/17 Patient with significant improvement in mentation today, she is aware of her hospitalization, not falling asleep mid sentence.  Her pain mediations were held most of yesterday and likely far too strong for patient and were affecting her awareness.  Oxycodone 5 discontinued and will use tramadol instead unless pain not well controlled.  CT brain with contrast ordered as a quick scan for comparison to MRI.    3/18 Patient feeling same today, discussed need to discuss with neurosurgery for possible biopsy.  Discussed with Dr Nguyen, he reviewed MRI and CT brain and he feels they are likely metastatic lesions but are far too small to biopsy.  His recommendation is to continue with antibiotics and re-image in 2 weeks to see if any change that would be amenable to biopsy or that would further declare infection.  3/26 Patient mental status continues to wax and wane.  ID ordered MRI lumbar, pelvis and sacrum for evaluation for source of infection, patient with continued low back pain and no real improvement since I saw her 7 days prior with continued antibiotics during this time.  Cultures have not growth pathogen, repeat MRI 3/28.   3/27 Patient somnolent most of the day today, MRI's completed and showing 2 areas of fluid collections, given her history are likely abscesses and will require drainage, CT guided drainage requested and patient NPO at [facility] for this.  D/w ID - cultures will be done on fluid collection.  Repeat CT brain ordered for tomorrow.  3/28 Patient up to chair, diet resumed as lovenox given this am and drainage of abscesses deferred until tomorrow.  Vanco/merrem to continue and cultures will be collected from abscesses.  3/29 Patient went to IR today, had drain placed in the right buttock into abscess cavity and fluid sent for culture.  Potassium is slightly low,  continue with PO replacement.  HR has improved from yesterday but patient PO intake still not at normal level, increase IVF rate to 100cc/hour.  3/30 Patient without fever since drain placed right buttock.  Purulent material in ÁNGEL bulb. Cultures thus far are still showing no growth.  Antibiotics to continue.  CT head ordered to evaluate change in past 2 weeks as recommended by neurosurgery.  3/31 Patient with fevers overnight - was altered during this time but has recovered.  She denies pain when examined by me.  She still has purulent material in the drainage tubing.  Will continue with current IV antibiotics - 6 weeks total antibiotics suspected - end date would be 4/24/19.  D/w ID.  4/1 Patient remains intermittently somnolent.  She wakes easily but falls back to sleep quickly.  She remained afebrile overnight.  ÁNGEL drain continues to drain purulent material.  ABX through 4/24 - once accepting facility found, patient okay to transfer with midline intact for completion of antibiotics.  4/9 Patient somnolent when examined.  CT showed resolution of fluid collection where ÁNGEL in place.  RN to remove ÁNGEL today.  All cultures still remain without growth.  4/10 Patient seen when working with OT, she is awake but gets off task quickly and is easily fatigued.  ÁNGEL drain removed yesterday.  Patient states she has pain in the lumbar spine when she coughs - just above her surgical area.  She has been in bed for nearly 1 month and this is likely not helping her back pain - educated need for OOB as often as tolerated.  4/11 Patient had bowel incontinence prior to my entering room, states she thinks she needs a bed pan.  CNA notified.  She continues to have low back pain in the area of her surgery and given her bed-bound status during her infection and intermittent confusion, this is not unexpected.  Continuing with therapy recommended and patient is 1:1 feeding given her weakness to feed herself.  4/12 Patient tearful this am, she  is concerned that she will not walk again, support given and educated that she is weak and the inability to walk is only temporary.  She needs to work with therapy and get stronger and I need to stop her narcotics and only give tyelnol/ibuprofen for pain management along with non-narcotic alternatives.  I spoke to patient's brother, René, at her request.  4/13 Patient up to chair when evaluated, mentation improved with discontinuation of narcotics. Brother is coming to visit this weekend and she is looking forward to this.  Ortho recommendations still pending - per ID she spoke to Dr Porter who is consulting Dr Sandra for recommendations.  4/14 Patient up to chair with assistance today.  CT brain remains same as prior 2 weeks ago.  Still pending ortho consultation.  4/15 Patient to be seen by ortho today per Dr Porter.  D/w dietary - patient with weight loss and poor po intake, she needs to be a 1:1 feed patient and if she fails this, will likely need tube feeding to get adequate nutrition.    4/23 Patient up to chair with sister much of the morning and had uncontrolled pain following this.  One time dose of oxycodone given with great relief but patient sleeping since that dose.  Sister is concerned about her breathing as she is only taking small breaths at times, given need for re-imaging of abdomen/pelvis, will add on chest for further evaluation.  Lungs sounded clear on ascultation.  Brother also up visiting with his wife.  Ortho did consult, recommended hardware needs to remain in place for at least 6 months or non-union likely to occur, continue with antibiotics.  4/24 Patient in bed today, much less somnolent than yesterday as no narcotics given.  She complains of pain but still remains lethargic also at times.  Tylenol and ibuprofen to alternate for pain control.  ABX to continue, fluid collections very similar in size when compared to previous imaging - gluteal pocket is 18mm (20mm) and ilacus is  2.7cm from 3.4x2.0cm.  These fluid collections are still to small to drain.  MRI brain is pending.  4/25 Patient with many questions today, she asked why she speaks funny and is hard to understand, why her left leg now hurts, if she will walk again, etc.  She is also having difficulty sleeping at night again - I will increase her seroquel qhs dose from 25 to 37.5mg.  MRI results reviewed with patient, she will need at lease another 4 weeks of antibiotics and a repeat MRI brain around may 24th.  She is clinically stable and okay to transfer to [facility] once accepted and can likely travel via private vehicle as needed.  4/26 Patient awake and alert, she worked with therapies today and is still very weak but motivated to get out of the hospital.  She states the higher dose of seroquel last night did not help much and she only slept 4 hours last night, I will increase dose to 50mg tonight as she did not show signs of too much am sedation from increased dose the night prior.  4/27 Patient very awake today, she does have a slight slur to her words but is still intelligible.  Some words she speaks in Ukrainian and others in English.  She knows she has a new diet after evaluation by SLP today.  Pain is worse today, will give single dose toradol to see if this helps without the somnolence.    Consultants/Specialty  ID - Ashish Rogers - s/o    Code Status  full    Disposition  LTAC CA vs [place]    Review of Systems  Review of Systems   Constitutional: Negative for chills and fever.   HENT: Negative for congestion and sore throat.    Eyes: Negative for blurred vision and photophobia.   Respiratory: Negative for cough and shortness of breath.    Cardiovascular: Negative for chest pain, claudication and leg swelling.   Gastrointestinal: Negative for abdominal pain, constipation, diarrhea, heartburn, nausea and vomiting.   Genitourinary: Negative for dysuria and hematuria.   Musculoskeletal: Positive for back pain (lumbar/sacral area )  and myalgias (right hip and buttock pain.). Negative for joint pain.   Skin: Negative for itching and rash.   Neurological: Negative for dizziness, sensory change, speech change, weakness and headaches.   Psychiatric/Behavioral: Negative for depression. The patient is not nervous/anxious and does not have insomnia.         Physical Exam  Temp:  [36.9 °C (98.4 °F)-37.4 °C (99.4 °F)] 37 °C (98.6 °F)  Pulse:  [] 110  Resp:  [17-18] 18  BP: (131-165)/(60-87) 165/85  SpO2:  [91 %-96 %] 91 %    Physical Exam   Constitutional: She is oriented to person, place, and time. She appears well-developed and well-nourished. No distress.   HENT:   Head: Normocephalic and atraumatic.   Eyes: Conjunctivae are normal. No scleral icterus.   Neck: Neck supple. No JVD present.   Cardiovascular: Normal rate, regular rhythm and normal heart sounds.  Exam reveals no gallop and no friction rub.    No murmur heard.  Pulmonary/Chest: Effort normal and breath sounds normal. No respiratory distress. She exhibits no tenderness.   Abdominal: Soft. Bowel sounds are normal. She exhibits no distension. There is no guarding.   Musculoskeletal: She exhibits no edema or tenderness.          Lymphadenopathy:     She has no cervical adenopathy.   Neurological: She is alert and oriented to person, place, and time. No cranial nerve deficit.   Mentation labile     Skin: Skin is warm and dry. She is not diaphoretic. No erythema. No pallor.   Psychiatric: She has a normal mood and affect. Her behavior is normal.   Nursing note and vitals reviewed.      Fluids  No intake or output data in the 24 hours ending 04/27/19 1402    Laboratory  Recent Labs      04/25/19   0029  04/26/19   0025  04/27/19   0032   WBC  5.3  4.7*  5.9   RBC  4.41  4.26  4.32   HEMOGLOBIN  11.2*  10.9*  11.3*   HEMATOCRIT  36.0*  34.6*  35.9*   MCV  81.6  81.2*  83.1   MCH  25.4*  25.6*  26.2*   MCHC  31.1*  31.5*  31.5*   RDW  53.2*  53.1*  54.6*   PLATELETCT  135*  117*  125*   MPV   11.2  10.7  11.8     Recent Labs      04/25/19   0029  04/26/19   0025  04/27/19   0032   SODIUM  138  139  136   POTASSIUM  3.0*  3.0*  3.2*   CHLORIDE  105  107  105   CO2  26  27  26   GLUCOSE  77  80  72   BUN  18  17  15   CREATININE  0.52  0.57  0.54   CALCIUM  10.1  9.4  9.1                   Imaging  MR-BRAIN-WITH & W/O   Final Result      Multiple punctate enhancing lesions seen throughout the supra and infratentorial brain parenchyma. When compared with the previous MRI there has been interval decrease in the size of the lesions. There are also interval reduction in the extent of the    surrounding white matter edema in the restricted diffusion consistent with improvement/treatment response of the most of the multifocal brain abscesses. However there are new enhancing lesions in the left basal ganglia and right cerebellum consistent    with interval new abscesses.Follow-up is recommended to ensure the complete resolution of the enhancing lesions. Please note that the radiological differential diagnosis of this multifocal lesions still includes metastasis. However decrease in the size    of the most of the lesions favors the diagnosis of infection.         CT-CHEST,ABDOMEN,PELVIS WITH   Final Result      1.  Limited evaluation of the thorax due to extensive patient respiratory motion artifact.      2.  Borderline enlarged bilateral axillary lymph nodes.      3.  Linear atelectasis within the lungs with patchy groundglass opacity bilaterally.      4.  Again seen screws extending through the sacrum bilaterally. There is surrounding lucency and fragmentation of the sacrum as well as the right greater than left ilium and a right iliac fracture. It is uncertain if these represent metastatic lesions    with pathologic fracture versus insufficiency fractures.      5.  Fluid collections within the right gluteal and iliacis muscles.. The collection within the right gluteal muscle has unchanged while the fluid  collection within the right iliacis muscle is increased somewhat in size. Differential diagnosis includes    hematoma as well as abscess.         6.  Enlarged retroperitoneal, periportal and portacaval lymph nodes.      DX-CHEST-LIMITED (1 VIEW)   Final Result      No acute cardiac or pulmonary abnormalities are identified. Lung volumes are low.      CT-ABDOMEN-PELVIS WITH   Final Result         1.  Enhancing fluid collection in the right gluteal muscle, appearance compatible with small abscess.   2.  Loculated enhancing fluid collection in the right iliac is muscle, corresponding with site of prior drain, appearance compatible with reaccumulation of abscess.   3.  Fluid-filled distention of small bowel suggests ileus   4.  Atherosclerosis   5.  Trace pericardial effusion      CT-CTA CHEST PULMONARY ARTERY W/ RECONS   Final Result         1.  No large central pulmonary embolus is appreciated, evaluation of the subsegmental branches is essentially nondiagnostic due to motion artifacts. Additional imaging would be required for definitive exclusion of small distal pulmonary emboli.   2.  Trace pericardial effusion   3.  Hepatomegaly   4.  Atherosclerosis.   5.  Right pulmonary nodules, the largest is a 7.7 mm pulmonary nodule, see nodule follow-up recommendations below.      Low Risk: CT at 3-6 months, then consider CT at 18-24 months      High Risk: CT at 3-6 months, then at 18-24 months      Comments: Use most suspicious nodule as guide to management. Follow-up intervals may vary according to size and risk.      Low Risk - Minimal or absent history of smoking and of other known risk factors.      High Risk - History of smoking or of other known risk factors.      Note: These recommendations do not apply to lung cancer screening, patients with immunosuppression, or patients with known primary cancer.      Fleischner Society 2017 Guidelines for Management of Incidentally Detected Pulmonary Nodules in Adults          DX-CHEST-PORTABLE (1 VIEW)   Final Result         1.  Mild pulmonary edema and/or infiltrate   2.  Cardiomegaly      CT-HEAD WITH & W/O   Final Result      1.  No change in scattered hyperenhancing lesions throughout the brain, cerebellum and visualized brainstem. Some of them demonstrate mild associated edema, similar to prior study.   2.  No CT evidence of infarct or hemorrhage.      CT-NEEDLE BX-MUSCLE RIGHT   Final Result   Addendum 1 of 1   Addendum:   The biopsy/drainage was done utilizing low dose optimization CT techniques    including auto modulation for  imaging and low mA CT/fluoroscopy mode    for the procedure.      Final      CT-guided aspiration of pus like material in the right gluteal muscle lesion.      CT-ABDOMEN-PELVIS WITH   Final Result      1.  Resolution of right iliacus abscess with no associated fluid adjacent to the drainage catheter      2.  Interval removal of right gluteal drainage catheter      3.  Surgical screw traversing both sacroiliac joint with associated pathologic fracture of the right ilium and right medial sacrum      4.  Hepatomegaly      CT-DRAIN-HEMATOMA - SEROMA   Final Result      1.  CT-GUIDED RETROPERITONEAL (EXTRAPERITONEAL) RIGHT PELVIC ABSCESS DRAINAGE AT THE RIGHT ILIOPSOAS. ORDERS WERE WRITTEN FOR ONCE DAILY IRRIGATION OF THE CATHETER WITH 5 ML STERILE SALINE.      2.  THE CURRENT PLAN IS TO MONITOR DRAINAGE OUTPUT AND OBTAIN A FOLLOWUP CT SCAN IN 5-7 DAYS IF CLINICALLY INDICATED.      CT-ABDOMEN-PELVIS WITH   Final Result      1.  Rim-enhancing fluid collection in the right iliopsoas muscle may have increased in size slightly from the prior exam.      2.  Pigtail catheter in the right gluteus medius muscle. No residual fluid collection is appreciated.      3.  Pathologic fracture of the right ilium. Status post right SI joint fusion.      4.  Atherosclerosis.      5.  Cholelithiasis.      CT-HEAD WITH & W/O   Final Result      1.  Interval decrease in size  and level of enhancement of multiple small punctate foci in both cerebral hemispheres and in the midbrain consistent with improving septic emboli.      2.  No hemorrhage or mass effect.      CT-DRAIN-RETROPERITONEAL   Final Result      1.  CT GUIDED CATHETER DRAINAGE OF RIGHT GLUTEAL ABSCESS.   2.  THE CURRENT PLAN IS TO OBTAIN A FOLLOW-UP CT SCAN IN 5-7 DAYS IF INDICATED. ORDERS WERE WRITTEN FOR ONCE DAILY IRRIGATION OF THE CATHETER WITH 5 ML STERILE SALINE FOR 3 DAYS.   3. THE ILIOPSOAS COLLECTION WAS NOT AMENABLE TO CATHETER DRAINAGE AS INTERVENING BOWEL AND/OR BONY STRUCTURES OBSTRUCT A PATHWAY TO THIS COLLECTION AT THIS TIME.      MR-LUMBAR SPINE-WITH & W/O   Final Result      1.  There are multifocal enhancing fluid collections in the right iliopsoas muscles and gluteus muscles adjacent to the right ilium. Right iliopsoas fluid collection measures an approximately 3.9 x 2.9 cm. The right gluteal fluid collection measures an    approximately 3.5 x 2.8 cm in size. The differential diagnosis includes postsurgical seroma and abscess.   2.  Post operative and degenerative changes as described above.      MR-PELVIS-WITH & W/O AND SEQUENCES   Final Result      1.  Surgical screw traverses both sacroiliac joint across presumed pathologic fracture of the right sacrum and the hardware obscures the adjacent tissues.      2.  No other evidence for bony metastatic disease      3.  Right gluteal musculature rim-enhancing fluid collection measuring 3.4 x 2.8 cm. This could be a postoperative seroma versus abscess      4.  osteoarthritis of both hip joint      5.  Prior hysterectomy      IR-MIDLINE CATHETER INSERTION WO GUIDANCE > AGE 3   Final Result                  Ultrasound-guided midline placement performed by qualified nursing staff    as above.          CT-HEAD WITH   Final Result      Multiple subcentimeter too numerous to count enhancing masses scattered throughout the supratentorial and infratentorial brain. These  demonstrate some surrounding vasogenic edema and most likely represent multifocal metastatic lesions. Other    considerations would include multifocal abscesses or septic emboli.      EC-CHAS W/O CONT   Final Result      EC-CHAS W/O CONT   Final Result      EC-ECHOCARDIOGRAM COMPLETE W/O CONT   Final Result      OUTSIDE IMAGES-CT CHEST/ABDOMEN/PELVIS   Final Result      OUTSIDE IMAGES-DX CHEST   Final Result      [id number] FILM X-RAY   Final Result      OUTSIDE IMAGES-MR BRAIN   Final Result      OUTSIDE IMAGES-MR BRAIN   Final Result           Assessment/Plan  * Lesion of brain   Assessment & Plan    IR Bx of psoas muscle ordered - was abscess  Continued treatment of infection  TTE and CHAS negative for vegetations.  MRI brain to repeat  Palliative consulted.  Vanco/merrem per ID  MRI brain 4/24 showing improvement old lesions but new lesions in right cerebellum and left basal ganglion  MRI brain due in 1 month for re-evaluation of duration of antibiotics, currently plan to continue for at least 4 more weeks.         Encephalopathy   Assessment & Plan    Waxes and wanes.  Likely secondary to multiple brain lesions.  Avoid benzodiazepines and/or anticholinergic medications.  Avoid sedatives or hypnotics.  At baseline for now.       Mass of iliopsoas muscle group   Assessment & Plan    Return of fluid collection, repeat drainage in IR with cultures, drain placed 3/29 - 10ml purulent material drained and sent for culture - no growth a/o 3/30  Was on linezolid and cefepime and now on vanco/merrem - end date to be > 6 weeks of treatment as now clinical resolution needed  ID following   CHAS negative for endocarditis.  Patient has sacral screws, Orthopedic surgery recommended hardware to stay for at least 6 months to promote adequate healing and to continue with antibiotics for now.  4/20: Patient did not qualify for IR aspiration and drain placement for the above-mentioned abscesses as they were too small.  repeat  CT abdomen/pelvis 4/23 - fluid collections still too small to drain  MRI brain - old lesions improving but unfortunately new lesions right cerebellum and left basal ganglion.       Pancytopenia (HCC)   Assessment & Plan    Could be due to toxic effects of infection on bone marrow.  Afebrile.  ANC within normal limits.  Continue to monitor.  Gradually improving.  Resolved although normal white blood cell count and normal ANC.  Mild thrombocytopenia.       Hyponatremia- (present on admission)   Assessment & Plan    Mildly low - doubt contribution to mentation         RLS (restless legs syndrome)- (present on admission)   Assessment & Plan    Pramipexole       Diabetes mellitus type 2 in obese (HCC)- (present on admission)   Assessment & Plan    Well controlled   Insulin sliding scale with hypoglycemia protocol.  Continue to monitor       History of breast cancer- (present on admission)   Assessment & Plan    Status post mastectomy.  Repeat CT showed decrease in size with antibiotics wishes consistent with abscesses rather than metastasis.  Continue IV antibiotis  Appreciate infectious disease recommendations.       COPD (chronic obstructive pulmonary disease) ([facility])- (present on admission)   Assessment & Plan    Currently maintaining saturation on room air.  Respiratory therapy per protocol.     Dysphagia   Assessment & Plan    Dysphagia I, nectar thick liquids per speech eval 4/27       Insomnia   Assessment & Plan    seroquel qhs 50mg       Hypercalcemia- (present on admission)   Assessment & Plan    Mild   Continue with Sensipar.  Monitor and adjust Sensipar dose accordingly.       Sinus tachycardia- (present on admission)   Assessment & Plan    Likely due to combination of infection and volume loss.  Echocardiogram with normal ejection fraction and no significant valvular abnormalities.  Tachycardia has resolved     Urinary tract infection   Assessment & Plan    Culture remain no growth.       Fungal infection of  the groin   Assessment & Plan    Resolved         History of deep vein thrombosis- (present on admission)   Assessment & Plan    History of deep vein thrombosis   Was on Rivaroxaban as outpatient.     Continues to be on hold as interventions often with drain placement/removal  Start prophylactic lovenox once no further interventions needed.           Essential hypertension- (present on admission)   Assessment & Plan    Restarted on losartan and furosemide with hold parameters.  Continue to monitor     Fever   Assessment & Plan    PRN Tylenol.  Continue monitor     GERD (gastroesophageal reflux disease)- (present on admission)   Assessment & Plan    Omeprazole     Chronic pain- (present on admission)   Assessment & Plan     Multifactorial  Likely secondary to abscess, fracture, bed bound status  Pain control, avoid narcotics given severe impact on patient's mentation.              VTE prophylaxis: scds

## 2019-04-27 NOTE — PROGRESS NOTES
Received change of shift report from roni RN. Assumed pt. Care at 0700. Pt. Aox2, 2 assist to pivot to chair, no signs of distress. Midline tko, IV abx in place. Plan of care discussed, questions answered. Bed in lowest position with wheels locked. Call light within reach. Pt. Has no complaints or requests at this time.

## 2019-04-28 LAB
ALBUMIN SERPL BCP-MCNC: 2.6 G/DL (ref 3.2–4.9)
ALBUMIN/GLOB SERPL: 0.7 G/DL
ALP SERPL-CCNC: 275 U/L (ref 30–99)
ALT SERPL-CCNC: 23 U/L (ref 2–50)
ANION GAP SERPL CALC-SCNC: 8 MMOL/L (ref 0–11.9)
AST SERPL-CCNC: 61 U/L (ref 12–45)
BASOPHILS # BLD AUTO: 1 % (ref 0–1.8)
BASOPHILS # BLD: 0.04 K/UL (ref 0–0.12)
BILIRUB SERPL-MCNC: 0.6 MG/DL (ref 0.1–1.5)
BUN SERPL-MCNC: 16 MG/DL (ref 8–22)
CALCIUM SERPL-MCNC: 8.9 MG/DL (ref 8.5–10.5)
CHLORIDE SERPL-SCNC: 109 MMOL/L (ref 96–112)
CO2 SERPL-SCNC: 25 MMOL/L (ref 20–33)
CREAT SERPL-MCNC: 0.5 MG/DL (ref 0.5–1.4)
EOSINOPHIL # BLD AUTO: 0.72 K/UL (ref 0–0.51)
EOSINOPHIL NFR BLD: 18.6 % (ref 0–6.9)
ERYTHROCYTE [DISTWIDTH] IN BLOOD BY AUTOMATED COUNT: 58.9 FL (ref 35.9–50)
GLOBULIN SER CALC-MCNC: 3.6 G/DL (ref 1.9–3.5)
GLUCOSE SERPL-MCNC: 83 MG/DL (ref 65–99)
HCT VFR BLD AUTO: 33.8 % (ref 37–47)
HGB BLD-MCNC: 10.4 G/DL (ref 12–16)
IMM GRANULOCYTES # BLD AUTO: 0.02 K/UL (ref 0–0.11)
IMM GRANULOCYTES NFR BLD AUTO: 0.5 % (ref 0–0.9)
LYMPHOCYTES # BLD AUTO: 0.61 K/UL (ref 1–4.8)
LYMPHOCYTES NFR BLD: 15.8 % (ref 22–41)
MCH RBC QN AUTO: 25.5 PG (ref 27–33)
MCHC RBC AUTO-ENTMCNC: 30.8 G/DL (ref 33.6–35)
MCV RBC AUTO: 82.8 FL (ref 81.4–97.8)
MONOCYTES # BLD AUTO: 0.57 K/UL (ref 0–0.85)
MONOCYTES NFR BLD AUTO: 14.7 % (ref 0–13.4)
NEUTROPHILS # BLD AUTO: 1.91 K/UL (ref 2–7.15)
NEUTROPHILS NFR BLD: 49.4 % (ref 44–72)
NRBC # BLD AUTO: 0 K/UL
NRBC BLD-RTO: 0 /100 WBC
PLATELET # BLD AUTO: 114 K/UL (ref 164–446)
PMV BLD AUTO: 11.5 FL (ref 9–12.9)
POTASSIUM SERPL-SCNC: 3 MMOL/L (ref 3.6–5.5)
PROT SERPL-MCNC: 6.2 G/DL (ref 6–8.2)
RBC # BLD AUTO: 4.08 M/UL (ref 4.2–5.4)
SODIUM SERPL-SCNC: 142 MMOL/L (ref 135–145)
WBC # BLD AUTO: 3.9 K/UL (ref 4.8–10.8)

## 2019-04-28 PROCEDURE — A9270 NON-COVERED ITEM OR SERVICE: HCPCS | Performed by: FAMILY MEDICINE

## 2019-04-28 PROCEDURE — 36415 COLL VENOUS BLD VENIPUNCTURE: CPT

## 2019-04-28 PROCEDURE — 700105 HCHG RX REV CODE 258: Performed by: INTERNAL MEDICINE

## 2019-04-28 PROCEDURE — 02HV33Z INSERTION OF INFUSION DEVICE INTO SUPERIOR VENA CAVA, PERCUTANEOUS APPROACH: ICD-10-PCS | Performed by: RADIOLOGY

## 2019-04-28 PROCEDURE — 700102 HCHG RX REV CODE 250 W/ 637 OVERRIDE(OP): Performed by: INTERNAL MEDICINE

## 2019-04-28 PROCEDURE — B548ZZA ULTRASONOGRAPHY OF SUPERIOR VENA CAVA, GUIDANCE: ICD-10-PCS | Performed by: RADIOLOGY

## 2019-04-28 PROCEDURE — A9270 NON-COVERED ITEM OR SERVICE: HCPCS | Performed by: INTERNAL MEDICINE

## 2019-04-28 PROCEDURE — 770004 HCHG ROOM/CARE - ONCOLOGY PRIVATE *

## 2019-04-28 PROCEDURE — 85025 COMPLETE CBC W/AUTO DIFF WBC: CPT

## 2019-04-28 PROCEDURE — 700111 HCHG RX REV CODE 636 W/ 250 OVERRIDE (IP): Performed by: INTERNAL MEDICINE

## 2019-04-28 PROCEDURE — 99232 SBSQ HOSP IP/OBS MODERATE 35: CPT | Performed by: INTERNAL MEDICINE

## 2019-04-28 PROCEDURE — 80053 COMPREHEN METABOLIC PANEL: CPT

## 2019-04-28 PROCEDURE — 700102 HCHG RX REV CODE 250 W/ 637 OVERRIDE(OP): Performed by: FAMILY MEDICINE

## 2019-04-28 RX ORDER — OXYCODONE HYDROCHLORIDE 5 MG/1
5 TABLET ORAL ONCE
Status: COMPLETED | OUTPATIENT
Start: 2019-04-28 | End: 2019-04-28

## 2019-04-28 RX ORDER — POTASSIUM CHLORIDE 20 MEQ/1
40 TABLET, EXTENDED RELEASE ORAL 2 TIMES DAILY
Status: DISCONTINUED | OUTPATIENT
Start: 2019-04-28 | End: 2019-04-30 | Stop reason: HOSPADM

## 2019-04-28 RX ADMIN — ACETAMINOPHEN 650 MG: 325 TABLET, FILM COATED ORAL at 01:02

## 2019-04-28 RX ADMIN — AMLODIPINE BESYLATE 10 MG: 10 TABLET ORAL at 04:54

## 2019-04-28 RX ADMIN — LOSARTAN POTASSIUM 50 MG: 50 TABLET ORAL at 04:54

## 2019-04-28 RX ADMIN — PRAMIPEXOLE DIHYDROCHLORIDE 0.25 MG: 0.25 TABLET ORAL at 12:52

## 2019-04-28 RX ADMIN — KETOROLAC TROMETHAMINE 15 MG: 30 INJECTION, SOLUTION INTRAMUSCULAR at 07:29

## 2019-04-28 RX ADMIN — MEROPENEM 2 G: 1 INJECTION, POWDER, FOR SOLUTION INTRAVENOUS at 04:23

## 2019-04-28 RX ADMIN — VANCOMYCIN HYDROCHLORIDE 600 MG: 100 INJECTION, POWDER, LYOPHILIZED, FOR SOLUTION INTRAVENOUS at 22:49

## 2019-04-28 RX ADMIN — OMEPRAZOLE 20 MG: 20 CAPSULE, DELAYED RELEASE ORAL at 04:54

## 2019-04-28 RX ADMIN — KETOROLAC TROMETHAMINE 15 MG: 30 INJECTION, SOLUTION INTRAMUSCULAR at 15:27

## 2019-04-28 RX ADMIN — OXYCODONE HYDROCHLORIDE 5 MG: 5 TABLET ORAL at 13:26

## 2019-04-28 RX ADMIN — ACETAMINOPHEN 650 MG: 325 TABLET, FILM COATED ORAL at 10:54

## 2019-04-28 RX ADMIN — POTASSIUM CHLORIDE 40 MEQ: 1500 TABLET, EXTENDED RELEASE ORAL at 04:54

## 2019-04-28 RX ADMIN — MEROPENEM 2 G: 1 INJECTION, POWDER, FOR SOLUTION INTRAVENOUS at 19:33

## 2019-04-28 RX ADMIN — POTASSIUM CHLORIDE 40 MEQ: 1500 TABLET, EXTENDED RELEASE ORAL at 18:27

## 2019-04-28 RX ADMIN — QUETIAPINE FUMARATE 50 MG: 25 TABLET ORAL at 22:44

## 2019-04-28 RX ADMIN — PRAMIPEXOLE DIHYDROCHLORIDE 0.25 MG: 0.25 TABLET ORAL at 04:54

## 2019-04-28 RX ADMIN — CINACALCET HYDROCHLORIDE 60 MG: 30 TABLET, FILM COATED ORAL at 07:28

## 2019-04-28 RX ADMIN — IBUPROFEN 600 MG: 400 TABLET, FILM COATED ORAL at 08:22

## 2019-04-28 RX ADMIN — MEROPENEM 2 G: 1 INJECTION, POWDER, FOR SOLUTION INTRAVENOUS at 12:52

## 2019-04-28 RX ADMIN — GABAPENTIN 300 MG: 300 CAPSULE ORAL at 18:27

## 2019-04-28 RX ADMIN — OXYCODONE HYDROCHLORIDE 5 MG: 5 TABLET ORAL at 18:26

## 2019-04-28 RX ADMIN — GABAPENTIN 300 MG: 300 CAPSULE ORAL at 04:54

## 2019-04-28 ASSESSMENT — ENCOUNTER SYMPTOMS
CHILLS: 0
CONSTIPATION: 0
CLAUDICATION: 0
WEAKNESS: 0
SENSORY CHANGE: 0
SORE THROAT: 0
NERVOUS/ANXIOUS: 1
INSOMNIA: 0
SPEECH CHANGE: 0
VOMITING: 0
SHORTNESS OF BREATH: 0
FEVER: 0
MYALGIAS: 1
DEPRESSION: 0
BACK PAIN: 1
BLURRED VISION: 0
PHOTOPHOBIA: 0
DIARRHEA: 0
ABDOMINAL PAIN: 0
HEADACHES: 0
DIZZINESS: 0
NAUSEA: 0
HEARTBURN: 0
COUGH: 0

## 2019-04-28 NOTE — PROGRESS NOTES
Pharmacy Kinetics 68 y.o. 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 There is concern that this is infectious. All cultures are negative to date, but were all drawn while on antibiotics. CHAS was negative. Patient has a history of breast cancer and DM. ID is following. Plan for completion of abx (6 weeks) in house prior to transfer to [facility] in [place]. 19 Per ID antibiotic therapy extended for at least an additional 4 weeks.     Other antibiotics: Meropenem 2g IV q8h     Allergies: Patient has no known allergies.      List concerns for renal function: age, and prolonged vancomycin duration, elevated BUN/SCr ratio, low albumin     Pertinent cultures to date:   19: wound - right gluteal mass: negative   19: blood - peripheral x 2: negative   19: wound - right buttock: negative   19: blood - peripheral x 2: negative   19: wound - retroperitoneal drain aspirate: negative   19:PBCx2:NGTD     MRSA nares swab if pneumonia is a concern (ordered/positive/negative/n-a): n/a    Recent Labs      19   0025  19   0032  19   0058   WBC  4.7*  5.9  3.9*   NEUTSPOLYS  49.80  66.10  49.40     Recent Labs      19   0025  19   0032  19   0058   BUN  17  15  16   CREATININE  0.57  0.54  0.50   ALBUMIN  2.5*  2.6*  2.6*     Recent Labs      19   2038   VANCOTROUGH  14.4     Intake/Output Summary (Last 24 hours) at 19 1004  Last data filed at 19 0600   Gross per 24 hour   Intake              257 ml   Output              200 ml   Net               57 ml      /76   Pulse (!) 101   Temp 36.5 °C (97.7 °F) (Temporal)   Resp 18   Ht 1.524 m (5')   Wt 65.3 kg (143 lb 15.4 oz)   SpO2 94%  Temp (24hrs), Av.7 °C  "(98.1 °F), Min:36.5 °C (97.7 °F), Max:37 °C (98.6 °F)      A/P   1. Vancomycin dose change: increase to 600mg q24h(2100)  2. Next vancomycin level: ~ 4-5 days or sooner if indicated  3. Goal trough: 16-20 mcg/mL  4. A/P: Renal indices appear stable. No s/s of vancomycin toxicity present at this time. MRI brain 4/24 shows \"new enhancing lesions in the left basal ganglia and right cerebellum consistent with interval new abscesses..\" Antibiotics extended an additional four weeks. Anticipate repeat MRI brain week of May 20th per ID. ID signed off. DC planning to [facility]. Vancomycin trough subtherapeutic. Increase dose as above.  Pharmacy to monitor and adjust as needed per protocol.      ZULEIKA Bojorquez, Pharm.D.      "

## 2019-04-28 NOTE — PROGRESS NOTES
"[facility] Medicine Daily Progress Note    Date of Service  4/28/2019    Chief Complaint  68 y.o. female admitted 3/8/2019 with right hip pain, fever and abnormal brain mri.    Hospital Course    Patient started on empiric antibiotics given fever.  She had abnormal findings on MRI brain and CT showed \"lesion\" on right iliopsoas.  This lesion was biopsied and turned out to be abscess.  She has history of breast cancer and there is also concern of brain lesions being metastatic though their appearance is not typical of this.      Interval Problem Update  3/12 Discussed with Dr Turner for second opinion of brain lesions and based on appearance not able to r/o or r/i any diagnosis.  Given patient's presentation of confusion, hip pain and fever  - this is more supportive of infectious etiology rather than malignant.  Fluid from right gluteal area sent to micro and as of yet - no growth.  Continue zosyn for now.  3/13 Patient with slight improvement in mentation.  Blood cultures and abscess cultures are showing no growth at this time.  TTE being done while I examined patient - no evidence of vegetations on this test.  ID consultation pending - d/w Dr Garcia.  3/14 Patient feeling well today, her pain is present but not as bad as prior to admission.  She was able to follow the conversation today and is agreeable to move forward with the CHAS tomorrow.  I spoke with her brother, René, and updated him on condition yesterday afternoon also.  Will also have PICC placed in next few days as long as blood cultures remain negative as I expect a prolonged antibiotic course of treatment.  3/15 Patient without new complaints, states she needs help to move in bed.  CHAS showed no evidence of vegetations and regular diet resumed.  Culture remains negative and biopsy of brain lesion may prove needed - will watch through the weekend and if mentation does not continue to improve, will discuss with neurosurgery on Monday.  3/16 Patient states " she is okay today, mentation has waxed and waned without significant improvement.  She was febrile earlier today.  No other acute events.  3/17 Patient with significant improvement in mentation today, she is aware of her hospitalization, not falling asleep mid sentence.  Her pain mediations were held most of yesterday and likely far too strong for patient and were affecting her awareness.  Oxycodone 5 discontinued and will use tramadol instead unless pain not well controlled.  CT brain with contrast ordered as a quick scan for comparison to MRI.    3/18 Patient feeling same today, discussed need to discuss with neurosurgery for possible biopsy.  Discussed with Dr Nguyen, he reviewed MRI and CT brain and he feels they are likely metastatic lesions but are far too small to biopsy.  His recommendation is to continue with antibiotics and re-image in 2 weeks to see if any change that would be amenable to biopsy or that would further declare infection.  3/26 Patient mental status continues to wax and wane.  ID ordered MRI lumbar, pelvis and sacrum for evaluation for source of infection, patient with continued low back pain and no real improvement since I saw her 7 days prior with continued antibiotics during this time.  Cultures have not growth pathogen, repeat MRI 3/28.   3/27 Patient somnolent most of the day today, MRI's completed and showing 2 areas of fluid collections, given her history are likely abscesses and will require drainage, CT guided drainage requested and patient NPO at [facility] for this.  D/w ID - cultures will be done on fluid collection.  Repeat CT brain ordered for tomorrow.  3/28 Patient up to chair, diet resumed as lovenox given this am and drainage of abscesses deferred until tomorrow.  Vanco/merrem to continue and cultures will be collected from abscesses.  3/29 Patient went to IR today, had drain placed in the right buttock into abscess cavity and fluid sent for culture.  Potassium is slightly low,  continue with PO replacement.  HR has improved from yesterday but patient PO intake still not at normal level, increase IVF rate to 100cc/hour.  3/30 Patient without fever since drain placed right buttock.  Purulent material in ÁNGEL bulb. Cultures thus far are still showing no growth.  Antibiotics to continue.  CT head ordered to evaluate change in past 2 weeks as recommended by neurosurgery.  3/31 Patient with fevers overnight - was altered during this time but has recovered.  She denies pain when examined by me.  She still has purulent material in the drainage tubing.  Will continue with current IV antibiotics - 6 weeks total antibiotics suspected - end date would be 4/24/19.  D/w ID.  4/1 Patient remains intermittently somnolent.  She wakes easily but falls back to sleep quickly.  She remained afebrile overnight.  ÁNGEL drain continues to drain purulent material.  ABX through 4/24 - once accepting facility found, patient okay to transfer with midline intact for completion of antibiotics.  4/9 Patient somnolent when examined.  CT showed resolution of fluid collection where ÁNGEL in place.  RN to remove ÁNGEL today.  All cultures still remain without growth.  4/10 Patient seen when working with OT, she is awake but gets off task quickly and is easily fatigued.  ÁNGEL drain removed yesterday.  Patient states she has pain in the lumbar spine when she coughs - just above her surgical area.  She has been in bed for nearly 1 month and this is likely not helping her back pain - educated need for OOB as often as tolerated.  4/11 Patient had bowel incontinence prior to my entering room, states she thinks she needs a bed pan.  CNA notified.  She continues to have low back pain in the area of her surgery and given her bed-bound status during her infection and intermittent confusion, this is not unexpected.  Continuing with therapy recommended and patient is 1:1 feeding given her weakness to feed herself.  4/12 Patient tearful this am, she  is concerned that she will not walk again, support given and educated that she is weak and the inability to walk is only temporary.  She needs to work with therapy and get stronger and I need to stop her narcotics and only give tyelnol/ibuprofen for pain management along with non-narcotic alternatives.  I spoke to patient's brother, René, at her request.  4/13 Patient up to chair when evaluated, mentation improved with discontinuation of narcotics. Brother is coming to visit this weekend and she is looking forward to this.  Ortho recommendations still pending - per ID she spoke to Dr Porter who is consulting Dr Sandra for recommendations.  4/14 Patient up to chair with assistance today.  CT brain remains same as prior 2 weeks ago.  Still pending ortho consultation.  4/15 Patient to be seen by ortho today per Dr Porter.  D/w dietary - patient with weight loss and poor po intake, she needs to be a 1:1 feed patient and if she fails this, will likely need tube feeding to get adequate nutrition.    4/23 Patient up to chair with sister much of the morning and had uncontrolled pain following this.  One time dose of oxycodone given with great relief but patient sleeping since that dose.  Sister is concerned about her breathing as she is only taking small breaths at times, given need for re-imaging of abdomen/pelvis, will add on chest for further evaluation.  Lungs sounded clear on ascultation.  Brother also up visiting with his wife.  Ortho did consult, recommended hardware needs to remain in place for at least 6 months or non-union likely to occur, continue with antibiotics.  4/24 Patient in bed today, much less somnolent than yesterday as no narcotics given.  She complains of pain but still remains lethargic also at times.  Tylenol and ibuprofen to alternate for pain control.  ABX to continue, fluid collections very similar in size when compared to previous imaging - gluteal pocket is 18mm (20mm) and ilacus is  2.7cm from 3.4x2.0cm.  These fluid collections are still to small to drain.  MRI brain is pending.  4/25 Patient with many questions today, she asked why she speaks funny and is hard to understand, why her left leg now hurts, if she will walk again, etc.  She is also having difficulty sleeping at night again - I will increase her seroquel qhs dose from 25 to 37.5mg.  MRI results reviewed with patient, she will need at ase another 4 weeks of antibiotics and a repeat MRI brain around may 24th.  She is clinically stable and okay to transfer to [facility] once accepted and can likely travel via private vehicle as needed.  4/26 Patient awake and alert, she worked with therapies today and is still very weak but motivated to get out of the hospital.  She states the higher dose of seroquel last night did not help much and she only slept 4 hours last night, I will increase dose to 50mg tonight as she did not show signs of too much am sedation from increased dose the night prior.  4/27 Patient very awake today, she does have a slight slur to her words but is still intelligible.  Some words she speaks in Kiny[place] and others in English.  She knows she has a new diet after evaluation by SLP today.  Pain is worse today, will give single dose toradol to see if this helps without the somnolence.  4/28 Patient very anxious today, her midline catheter was leaking and because of this she's since been very worked up.  I've requested PICC placement with removal of midline catheter.    Consultants/Specialty  ID - Hinojosa - s/o  Ortho - s/o    Code Status  full    Disposition  LTAC CA vs NV    Review of Systems  Review of Systems   Constitutional: Negative for chills and fever.   HENT: Negative for congestion and sore throat.    Eyes: Negative for blurred vision and photophobia.   Respiratory: Negative for cough and shortness of breath.    Cardiovascular: Negative for chest pain, claudication and leg swelling.   Gastrointestinal: Negative for  abdominal pain, constipation, diarrhea, heartburn, nausea and vomiting.   Genitourinary: Negative for dysuria and hematuria.   Musculoskeletal: Positive for back pain (lumbar/sacral area ) and myalgias (right hip and buttock pain.). Negative for joint pain.   Skin: Negative for itching and rash.   Neurological: Negative for dizziness, sensory change, speech change, weakness and headaches.   Psychiatric/Behavioral: Negative for depression. The patient is nervous/anxious. The patient does not have insomnia.         Physical Exam  Temp:  [36.5 °C (97.7 °F)-37 °C (98.6 °F)] 36.7 °C (98 °F)  Pulse:  [] 81  Resp:  [14-18] 16  BP: (154-165)/(73-85) 158/73  SpO2:  [91 %-98 %] 98 %    Physical Exam   Constitutional: She is oriented to person, place, and time. She appears well-developed and well-nourished. No distress.   HENT:   Head: Normocephalic and atraumatic.   Eyes: Conjunctivae are normal. No scleral icterus.   Neck: Neck supple. No JVD present.   Cardiovascular: Normal rate, regular rhythm and normal heart sounds.  Exam reveals no gallop and no friction rub.    No murmur heard.  Pulmonary/Chest: Effort normal and breath sounds normal. No respiratory distress. She exhibits no tenderness.   Abdominal: Soft. Bowel sounds are normal. She exhibits no distension. There is no guarding.   Musculoskeletal: She exhibits no edema or tenderness.          Lymphadenopathy:     She has no cervical adenopathy.   Neurological: She is alert and oriented to person, place, and time. No cranial nerve deficit.   Mentation labile     Skin: Skin is warm and dry. She is not diaphoretic. No erythema. No pallor.   Psychiatric: She has a normal mood and affect. Her behavior is normal.   Nursing note and vitals reviewed.      Fluids    Intake/Output Summary (Last 24 hours) at 04/28/19 1239  Last data filed at 04/28/19 0600   Gross per 24 hour   Intake              257 ml   Output              200 ml   Net               57 ml        Laboratory  Recent Labs      04/26/19   0025  04/27/19   0032  04/28/19   0058   WBC  4.7*  5.9  3.9*   RBC  4.26  4.32  4.08*   HEMOGLOBIN  10.9*  11.3*  10.4*   HEMATOCRIT  34.6*  35.9*  33.8*   MCV  81.2*  83.1  82.8   MCH  25.6*  26.2*  25.5*   MCHC  31.5*  31.5*  30.8*   RDW  53.1*  54.6*  58.9*   PLATELETCT  117*  125*  114*   MPV  10.7  11.8  11.5     Recent Labs      04/26/19   0025  04/27/19   0032  04/28/19   0058   SODIUM  139  136  142   POTASSIUM  3.0*  3.2*  3.0*   CHLORIDE  107  105  109   CO2  27  26  25   GLUCOSE  80  72  83   BUN  17  15  16   CREATININE  0.57  0.54  0.50   CALCIUM  9.4  9.1  8.9                   Imaging  MR-BRAIN-WITH & W/O   Final Result      Multiple punctate enhancing lesions seen throughout the supra and infratentorial brain parenchyma. When compared with the previous MRI there has been interval decrease in the size of the lesions. There are also interval reduction in the extent of the    surrounding white matter edema in the restricted diffusion consistent with improvement/treatment response of the most of the multifocal brain abscesses. However there are new enhancing lesions in the left basal ganglia and right cerebellum consistent    with interval new abscesses.Follow-up is recommended to ensure the complete resolution of the enhancing lesions. Please note that the radiological differential diagnosis of this multifocal lesions still includes metastasis. However decrease in the size    of the most of the lesions favors the diagnosis of infection.         CT-CHEST,ABDOMEN,PELVIS WITH   Final Result      1.  Limited evaluation of the thorax due to extensive patient respiratory motion artifact.      2.  Borderline enlarged bilateral axillary lymph nodes.      3.  Linear atelectasis within the lungs with patchy groundglass opacity bilaterally.      4.  Again seen screws extending through the sacrum bilaterally. There is surrounding lucency and fragmentation of the sacrum  as well as the right greater than left ilium and a right iliac fracture. It is uncertain if these represent metastatic lesions    with pathologic fracture versus insufficiency fractures.      5.  Fluid collections within the right gluteal and iliacis muscles.. The collection within the right gluteal muscle has unchanged while the fluid collection within the right iliacis muscle is increased somewhat in size. Differential diagnosis includes    hematoma as well as abscess.         6.  Enlarged retroperitoneal, periportal and portacaval lymph nodes.      DX-CHEST-LIMITED (1 VIEW)   Final Result      No acute cardiac or pulmonary abnormalities are identified. Lung volumes are low.      CT-ABDOMEN-PELVIS WITH   Final Result         1.  Enhancing fluid collection in the right gluteal muscle, appearance compatible with small abscess.   2.  Loculated enhancing fluid collection in the right iliac is muscle, corresponding with site of prior drain, appearance compatible with reaccumulation of abscess.   3.  Fluid-filled distention of small bowel suggests ileus   4.  Atherosclerosis   5.  Trace pericardial effusion      CT-CTA CHEST PULMONARY ARTERY W/ RECONS   Final Result         1.  No large central pulmonary embolus is appreciated, evaluation of the subsegmental branches is essentially nondiagnostic due to motion artifacts. Additional imaging would be required for definitive exclusion of small distal pulmonary emboli.   2.  Trace pericardial effusion   3.  Hepatomegaly   4.  Atherosclerosis.   5.  Right pulmonary nodules, the largest is a 7.7 mm pulmonary nodule, see nodule follow-up recommendations below.      Low Risk: CT at 3-6 months, then consider CT at 18-24 months      High Risk: CT at 3-6 months, then at 18-24 months      Comments: Use most suspicious nodule as guide to management. Follow-up intervals may vary according to size and risk.      Low Risk - Minimal or absent history of smoking and of other known risk  factors.      High Risk - History of smoking or of other known risk factors.      Note: These recommendations do not apply to lung cancer screening, patients with immunosuppression, or patients with known primary cancer.      Fleischner Society 2017 Guidelines for Management of Incidentally Detected Pulmonary Nodules in Adults         DX-CHEST-PORTABLE (1 VIEW)   Final Result         1.  Mild pulmonary edema and/or infiltrate   2.  Cardiomegaly      CT-HEAD WITH & W/O   Final Result      1.  No change in scattered hyperenhancing lesions throughout the brain, cerebellum and visualized brainstem. Some of them demonstrate mild associated edema, similar to prior study.   2.  No CT evidence of infarct or hemorrhage.      CT-NEEDLE BX-MUSCLE RIGHT   Final Result   Addendum 1 of 1   Addendum:   The biopsy/drainage was done utilizing low dose optimization CT techniques    including auto modulation for  imaging and low mA CT/fluoroscopy mode    for the procedure.      Final      CT-guided aspiration of pus like material in the right gluteal muscle lesion.      CT-ABDOMEN-PELVIS WITH   Final Result      1.  Resolution of right iliacus abscess with no associated fluid adjacent to the drainage catheter      2.  Interval removal of right gluteal drainage catheter      3.  Surgical screw traversing both sacroiliac joint with associated pathologic fracture of the right ilium and right medial sacrum      4.  Hepatomegaly      CT-DRAIN-HEMATOMA - SEROMA   Final Result      1.  CT-GUIDED RETROPERITONEAL (EXTRAPERITONEAL) RIGHT PELVIC ABSCESS DRAINAGE AT THE RIGHT ILIOPSOAS. ORDERS WERE WRITTEN FOR ONCE DAILY IRRIGATION OF THE CATHETER WITH 5 ML STERILE SALINE.      2.  THE CURRENT PLAN IS TO MONITOR DRAINAGE OUTPUT AND OBTAIN A FOLLOWUP CT SCAN IN 5-7 DAYS IF CLINICALLY INDICATED.      CT-ABDOMEN-PELVIS WITH   Final Result      1.  Rim-enhancing fluid collection in the right iliopsoas muscle may have increased in size slightly  from the prior exam.      2.  Pigtail catheter in the right gluteus medius muscle. No residual fluid collection is appreciated.      3.  Pathologic fracture of the right ilium. Status post right SI joint fusion.      4.  Atherosclerosis.      5.  Cholelithiasis.      CT-HEAD WITH & W/O   Final Result      1.  Interval decrease in size and level of enhancement of multiple small punctate foci in both cerebral hemispheres and in the midbrain consistent with improving septic emboli.      2.  No hemorrhage or mass effect.      CT-DRAIN-RETROPERITONEAL   Final Result      1.  CT GUIDED CATHETER DRAINAGE OF RIGHT GLUTEAL ABSCESS.   2.  THE CURRENT PLAN IS TO OBTAIN A FOLLOW-UP CT SCAN IN 5-7 DAYS IF INDICATED. ORDERS WERE WRITTEN FOR ONCE DAILY IRRIGATION OF THE CATHETER WITH 5 ML STERILE SALINE FOR 3 DAYS.   3. THE ILIOPSOAS COLLECTION WAS NOT AMENABLE TO CATHETER DRAINAGE AS INTERVENING BOWEL AND/OR BONY STRUCTURES OBSTRUCT A PATHWAY TO THIS COLLECTION AT THIS TIME.      MR-LUMBAR SPINE-WITH & W/O   Final Result      1.  There are multifocal enhancing fluid collections in the right iliopsoas muscles and gluteus muscles adjacent to the right ilium. Right iliopsoas fluid collection measures an approximately 3.9 x 2.9 cm. The right gluteal fluid collection measures an    approximately 3.5 x 2.8 cm in size. The differential diagnosis includes postsurgical seroma and abscess.   2.  Post operative and degenerative changes as described above.      MR-PELVIS-WITH & W/O AND SEQUENCES   Final Result      1.  Surgical screw traverses both sacroiliac joint across presumed pathologic fracture of the right sacrum and the hardware obscures the adjacent tissues.      2.  No other evidence for bony metastatic disease      3.  Right gluteal musculature rim-enhancing fluid collection measuring 3.4 x 2.8 cm. This could be a postoperative seroma versus abscess      4.  osteoarthritis of both hip joint      5.  Prior hysterectomy       IR-MIDLINE CATHETER INSERTION WO GUIDANCE > AGE 3   Final Result                  Ultrasound-guided midline placement performed by qualified nursing staff    as above.          CT-HEAD WITH   Final Result      Multiple subcentimeter too numerous to count enhancing masses scattered throughout the supratentorial and infratentorial brain. These demonstrate some surrounding vasogenic edema and most likely represent multifocal metastatic lesions. Other    considerations would include multifocal abscesses or septic emboli.      EC-CHAS W/O CONT   Final Result      EC-CHAS W/O CONT   Final Result      EC-ECHOCARDIOGRAM COMPLETE W/O CONT   Final Result      OUTSIDE IMAGES-CT CHEST/ABDOMEN/PELVIS   Final Result      OUTSIDE IMAGES-DX CHEST   Final Result      [id number] FILM X-RAY   Final Result      OUTSIDE IMAGES-MR BRAIN   Final Result      OUTSIDE IMAGES-MR BRAIN   Final Result      IR-PICC LINE PLACEMENT W/ GUIDANCE > AGE 5    (Results Pending)        Assessment/Plan  * Lesion of brain   Assessment & Plan    IR Bx of psoas muscle ordered - was abscess  Continued treatment of infection  TTE and CHAS negative for vegetations.  MRI brain to repeat  Palliative consulted.  Vanco/merrem per ID  MRI brain 4/24 showing improvement old lesions but new lesions in right cerebellum and left basal ganglion  MRI brain due in 1 month for re-evaluation of duration of antibiotics, currently plan to continue for at least 4 more weeks.         Encephalopathy   Assessment & Plan    Waxes and wanes.  Likely secondary to multiple brain lesions.  Avoid benzodiazepines and/or anticholinergic medications.  Avoid sedatives or hypnotics.  At baseline for now.       Mass of iliopsoas muscle group   Assessment & Plan    Return of fluid collection, repeat drainage in IR with cultures, drain placed 3/29 - 10ml purulent material drained and sent for culture - no growth a/o 3/30  Was on linezolid and cefepime and now on vanco/merrem - end date  to be > 6 weeks of treatment as now clinical resolution needed  ID following   CHAS negative for endocarditis.  Patient has sacral screws, Orthopedic surgery recommended hardware to stay for at least 6 months to promote adequate healing and to continue with antibiotics for now.  4/20: Patient did not qualify for IR aspiration and drain placement for the above-mentioned abscesses as they were too small.  repeat CT abdomen/pelvis 4/23 - fluid collections still too small to drain  MRI brain - old lesions improving but unfortunately new lesions right cerebellum and left basal ganglion.       Pancytopenia (HCC)   Assessment & Plan    Could be due to toxic effects of infection on bone marrow.  Afebrile.  ANC within normal limits.  Continue to monitor.  Gradually improving.  Resolved although normal white blood cell count and normal ANC.  Mild thrombocytopenia.       Hyponatremia- (present on admission)   Assessment & Plan    Mildly low - doubt contribution to mentation         RLS (restless legs syndrome)- (present on admission)   Assessment & Plan    Pramipexole       Diabetes mellitus type 2 in obese ([facility])- (present on admission)   Assessment & Plan    Well controlled   Insulin sliding scale with hypoglycemia protocol.  Continue to monitor       History of breast cancer- (present on admission)   Assessment & Plan    Status post mastectomy.  Repeat CT showed decrease in size with antibiotics wishes consistent with abscesses rather than metastasis.  Continue IV antibiotis  Appreciate infectious disease recommendations.       COPD (chronic obstructive pulmonary disease) ([facility])- (present on admission)   Assessment & Plan    Currently maintaining saturation on room air.  Respiratory therapy per protocol.     Dysphagia   Assessment & Plan    Dysphagia I, nectar thick liquids per speech eval 4/27       Insomnia   Assessment & Plan    seroquel qhs 50mg       Hypercalcemia- (present on admission)   Assessment & Plan    Mild    Continue with Sensipar.  Monitor and adjust Sensipar dose accordingly.       Sinus tachycardia- (present on admission)   Assessment & Plan    Likely due to combination of infection and volume loss.  Echocardiogram with normal ejection fraction and no significant valvular abnormalities.  Tachycardia has resolved     Urinary tract infection   Assessment & Plan    Culture remain no growth.       Fungal infection of the groin   Assessment & Plan    Resolved         History of deep vein thrombosis- (present on admission)   Assessment & Plan    History of deep vein thrombosis   Was on Rivaroxaban as outpatient.     Continues to be on hold as interventions often with drain placement/removal  Start prophylactic lovenox once no further interventions needed.           Essential hypertension- (present on admission)   Assessment & Plan    Restarted on losartan and furosemide with hold parameters.  Continue to monitor     Fever   Assessment & Plan    PRN Tylenol.  Continue monitor     GERD (gastroesophageal reflux disease)- (present on admission)   Assessment & Plan    Omeprazole     Chronic pain- (present on admission)   Assessment & Plan     Multifactorial  Likely secondary to abscess, fracture, bed bound status  Pain control, avoid narcotics given severe impact on patient's mentation.              VTE prophylaxis: scds

## 2019-04-28 NOTE — CARE PLAN
Problem: Safety  Goal: Will remain free from falls  Outcome: PROGRESSING AS EXPECTED  Mobility and pt is max assist. Fall precautions are in place.    Problem: Infection  Goal: Will remain free from infection  Outcome: PROGRESSING AS EXPECTED  Pt is afebrile

## 2019-04-28 NOTE — CARE PLAN
Problem: Communication  Goal: The ability to communicate needs accurately and effectively will improve  Outcome: PROGRESSING AS EXPECTED  AxO x3, disoriented to time. Patient able to make needs known. Calls appropriately for assistance. Call light within reach.     Problem: Pain Management  Goal: Pain level will decrease to patient's comfort goal  Outcome: PROGRESSING SLOWER THAN EXPECTED  Patient rating pain 9/10, medicated see EMAR. MD started patient on toradol for better pain control. Will provide patient with breakthrough medication PRN.

## 2019-04-28 NOTE — PROGRESS NOTES
Received bedside report from day shift RN. Mally x3, disoriented to time. Bed alarm on. Up x2. Turning as patient can tolerate. Midline patent, flushing, - blood return. Patient reports 9/10 pain, medicated and provided ice pack to hip. Denies nausea, vomiting. POC discussed with patient. Calls appropriately for assistance. Call light within reach. Bed in lowest and locked position. Adequate lighting provided. Refused non skid wear. Q2 rounding in place.

## 2019-04-28 NOTE — PROGRESS NOTES
/73   Pulse 81   Temp 36.7 °C (98 °F) (Oral) Comment: vitals taken at 11:52  Resp 16   Ht 1.524 m (5')   Wt 65.3 kg (143 lb 15.4 oz)   SpO2 98%   Breastfeeding? No   BMI 28.12 kg/m²   Received report and assumed care of pt at 0700. Pt is A&O x3. Pt disoriented to time. Pt complains of pain 9/10 and states pain medication isn't helping. MD notified and orders a once dose of oxycodone 5mg. Pt midline patent and infusing. Fall precautions are in place. Bed is in lowest position and call light is within reach. Hourly rounding is in place.

## 2019-04-29 ENCOUNTER — APPOINTMENT (OUTPATIENT)
Dept: RADIOLOGY | Facility: MEDICAL CENTER | Age: 69
DRG: 981 | End: 2019-04-29
Attending: INTERNAL MEDICINE
Payer: MEDICARE

## 2019-04-29 LAB
ALBUMIN SERPL BCP-MCNC: 2.9 G/DL (ref 3.2–4.9)
ALBUMIN/GLOB SERPL: 0.7 G/DL
ALP SERPL-CCNC: 319 U/L (ref 30–99)
ALT SERPL-CCNC: 36 U/L (ref 2–50)
ANION GAP SERPL CALC-SCNC: 7 MMOL/L (ref 0–11.9)
AST SERPL-CCNC: 82 U/L (ref 12–45)
BASOPHILS # BLD AUTO: 0.9 % (ref 0–1.8)
BASOPHILS # BLD: 0.04 K/UL (ref 0–0.12)
BILIRUB SERPL-MCNC: 0.6 MG/DL (ref 0.1–1.5)
BUN SERPL-MCNC: 14 MG/DL (ref 8–22)
CALCIUM SERPL-MCNC: 8.6 MG/DL (ref 8.5–10.5)
CHLORIDE SERPL-SCNC: 108 MMOL/L (ref 96–112)
CO2 SERPL-SCNC: 26 MMOL/L (ref 20–33)
CREAT SERPL-MCNC: 0.48 MG/DL (ref 0.5–1.4)
EOSINOPHIL # BLD AUTO: 0.78 K/UL (ref 0–0.51)
EOSINOPHIL NFR BLD: 18.1 % (ref 0–6.9)
ERYTHROCYTE [DISTWIDTH] IN BLOOD BY AUTOMATED COUNT: 59.5 FL (ref 35.9–50)
GLOBULIN SER CALC-MCNC: 3.9 G/DL (ref 1.9–3.5)
GLUCOSE SERPL-MCNC: 76 MG/DL (ref 65–99)
HCT VFR BLD AUTO: 36.3 % (ref 37–47)
HGB BLD-MCNC: 11.3 G/DL (ref 12–16)
IMM GRANULOCYTES # BLD AUTO: 0.02 K/UL (ref 0–0.11)
IMM GRANULOCYTES NFR BLD AUTO: 0.5 % (ref 0–0.9)
LYMPHOCYTES # BLD AUTO: 0.52 K/UL (ref 1–4.8)
LYMPHOCYTES NFR BLD: 12 % (ref 22–41)
MCH RBC QN AUTO: 25.7 PG (ref 27–33)
MCHC RBC AUTO-ENTMCNC: 31.1 G/DL (ref 33.6–35)
MCV RBC AUTO: 82.7 FL (ref 81.4–97.8)
MONOCYTES # BLD AUTO: 0.59 K/UL (ref 0–0.85)
MONOCYTES NFR BLD AUTO: 13.7 % (ref 0–13.4)
NEUTROPHILS # BLD AUTO: 2.37 K/UL (ref 2–7.15)
NEUTROPHILS NFR BLD: 54.8 % (ref 44–72)
NRBC # BLD AUTO: 0 K/UL
NRBC BLD-RTO: 0 /100 WBC
PLATELET # BLD AUTO: 103 K/UL (ref 164–446)
PMV BLD AUTO: 9.7 FL (ref 9–12.9)
POTASSIUM SERPL-SCNC: 3.2 MMOL/L (ref 3.6–5.5)
PROT SERPL-MCNC: 6.8 G/DL (ref 6–8.2)
RBC # BLD AUTO: 4.39 M/UL (ref 4.2–5.4)
SODIUM SERPL-SCNC: 141 MMOL/L (ref 135–145)
WBC # BLD AUTO: 4.3 K/UL (ref 4.8–10.8)

## 2019-04-29 PROCEDURE — A9270 NON-COVERED ITEM OR SERVICE: HCPCS | Performed by: FAMILY MEDICINE

## 2019-04-29 PROCEDURE — 700111 HCHG RX REV CODE 636 W/ 250 OVERRIDE (IP): Performed by: INTERNAL MEDICINE

## 2019-04-29 PROCEDURE — 85025 COMPLETE CBC W/AUTO DIFF WBC: CPT

## 2019-04-29 PROCEDURE — 80053 COMPREHEN METABOLIC PANEL: CPT

## 2019-04-29 PROCEDURE — 700102 HCHG RX REV CODE 250 W/ 637 OVERRIDE(OP): Performed by: INTERNAL MEDICINE

## 2019-04-29 PROCEDURE — 36573 INSJ PICC RS&I 5 YR+: CPT

## 2019-04-29 PROCEDURE — 99232 SBSQ HOSP IP/OBS MODERATE 35: CPT | Performed by: INTERNAL MEDICINE

## 2019-04-29 PROCEDURE — 770004 HCHG ROOM/CARE - ONCOLOGY PRIVATE *

## 2019-04-29 PROCEDURE — 700105 HCHG RX REV CODE 258: Performed by: INTERNAL MEDICINE

## 2019-04-29 PROCEDURE — A9270 NON-COVERED ITEM OR SERVICE: HCPCS | Performed by: INTERNAL MEDICINE

## 2019-04-29 PROCEDURE — 36415 COLL VENOUS BLD VENIPUNCTURE: CPT

## 2019-04-29 PROCEDURE — 700102 HCHG RX REV CODE 250 W/ 637 OVERRIDE(OP): Performed by: FAMILY MEDICINE

## 2019-04-29 PROCEDURE — 92526 ORAL FUNCTION THERAPY: CPT

## 2019-04-29 RX ADMIN — MEROPENEM 2 G: 1 INJECTION, POWDER, FOR SOLUTION INTRAVENOUS at 12:31

## 2019-04-29 RX ADMIN — VANCOMYCIN HYDROCHLORIDE 600 MG: 100 INJECTION, POWDER, LYOPHILIZED, FOR SOLUTION INTRAVENOUS at 23:41

## 2019-04-29 RX ADMIN — KETOROLAC TROMETHAMINE 15 MG: 30 INJECTION, SOLUTION INTRAMUSCULAR at 21:49

## 2019-04-29 RX ADMIN — MEROPENEM 2 G: 1 INJECTION, POWDER, FOR SOLUTION INTRAVENOUS at 05:28

## 2019-04-29 RX ADMIN — AMLODIPINE BESYLATE 10 MG: 10 TABLET ORAL at 05:36

## 2019-04-29 RX ADMIN — IBUPROFEN 600 MG: 400 TABLET, FILM COATED ORAL at 19:35

## 2019-04-29 RX ADMIN — MEROPENEM 2 G: 1 INJECTION, POWDER, FOR SOLUTION INTRAVENOUS at 20:15

## 2019-04-29 RX ADMIN — OMEPRAZOLE 20 MG: 20 CAPSULE, DELAYED RELEASE ORAL at 05:36

## 2019-04-29 RX ADMIN — IBUPROFEN 600 MG: 400 TABLET, FILM COATED ORAL at 08:16

## 2019-04-29 RX ADMIN — LABETALOL HCL 100 MG: 100 TABLET, FILM COATED ORAL at 08:16

## 2019-04-29 RX ADMIN — CINACALCET HYDROCHLORIDE 60 MG: 30 TABLET, FILM COATED ORAL at 05:38

## 2019-04-29 RX ADMIN — GABAPENTIN 300 MG: 300 CAPSULE ORAL at 05:36

## 2019-04-29 RX ADMIN — POTASSIUM CHLORIDE 40 MEQ: 1500 TABLET, EXTENDED RELEASE ORAL at 17:44

## 2019-04-29 RX ADMIN — ACETAMINOPHEN 650 MG: 325 TABLET, FILM COATED ORAL at 08:16

## 2019-04-29 RX ADMIN — QUETIAPINE FUMARATE 50 MG: 25 TABLET ORAL at 17:44

## 2019-04-29 RX ADMIN — GABAPENTIN 300 MG: 300 CAPSULE ORAL at 17:44

## 2019-04-29 RX ADMIN — LOSARTAN POTASSIUM 50 MG: 50 TABLET ORAL at 05:36

## 2019-04-29 RX ADMIN — POTASSIUM CHLORIDE 40 MEQ: 1500 TABLET, EXTENDED RELEASE ORAL at 05:36

## 2019-04-29 ASSESSMENT — ENCOUNTER SYMPTOMS
FEVER: 0
SHORTNESS OF BREATH: 0
DIARRHEA: 0
BLURRED VISION: 0
SPEECH CHANGE: 0
COUGH: 0
CLAUDICATION: 0
VOMITING: 0
NERVOUS/ANXIOUS: 1
HEADACHES: 0
ABDOMINAL PAIN: 0
INSOMNIA: 0
DIZZINESS: 0
CONSTIPATION: 0
HEARTBURN: 0
PHOTOPHOBIA: 0
DEPRESSION: 0
SENSORY CHANGE: 0
SORE THROAT: 0
MYALGIAS: 1
BACK PAIN: 1
NAUSEA: 0
CHILLS: 0
WEAKNESS: 0

## 2019-04-29 NOTE — THERAPY
"Speech Language Therapy dysphagia treatment completed.     Functional Status:  The patient was seen for dysphagia tx at breakfast, with a dysphagia I/NTL meal tray.  Pt was AAOx2, confusion to date and reason.  She complained of pain in her legs when sitting up, and was noted to fatigue quickly during session.  Pt consumed purees and nectars without any overt s/sx of aspiration.  Swallow initation was delayed up to 3 seconds, however laryngeal elevation palpated as complete today.  Pt only took limited amounts of her meal before reporting she could not sit up 2/2 pain, and declined any further PO intake.  Recommend to continue a dysphagia I diet with nectars, with 1:1 feeding, and use of posted swallow precautions.    Recommendations: continue a dysphagia I diet with nectars, with 1:1 feeding, and use of posted swallow precautions.     Plan of Care: Will benefit from Speech Therapy 3 times per week    Post-Acute Therapy: Recommend inpatient transitional care services for continued speech therapy services.      See \"Rehab Therapy-Acute\" Patient Summary Report for complete documentation.     "

## 2019-04-29 NOTE — PROGRESS NOTES
"[facility] Medicine Daily Progress Note    Date of Service  4/29/2019    Chief Complaint  68 y.o. female admitted 3/8/2019 with right hip pain, fever and abnormal brain mri.    Hospital Course    Patient started on empiric antibiotics given fever.  She had abnormal findings on MRI brain and CT showed \"lesion\" on right iliopsoas.  This lesion was biopsied and turned out to be abscess.  She has history of breast cancer and there is also concern of brain lesions being metastatic though their appearance is not typical of this.      Interval Problem Update  3/12 Discussed with Dr Turner for second opinion of brain lesions and based on appearance not able to r/o or r/i any diagnosis.  Given patient's presentation of confusion, hip pain and fever  - this is more supportive of infectious etiology rather than malignant.  Fluid from right gluteal area sent to micro and as of yet - no growth.  Continue zosyn for now.  3/13 Patient with slight improvement in mentation.  Blood cultures and abscess cultures are showing no growth at this time.  TTE being done while I examined patient - no evidence of vegetations on this test.  ID consultation pending - d/w Dr Garcia.  3/14 Patient feeling well today, her pain is present but not as bad as prior to admission.  She was able to follow the conversation today and is agreeable to move forward with the CHAS tomorrow.  I spoke with her brother, René, and updated him on condition yesterday afternoon also.  Will also have PICC placed in next few days as long as blood cultures remain negative as I expect a prolonged antibiotic course of treatment.  3/15 Patient without new complaints, states she needs help to move in bed.  CHAS showed no evidence of vegetations and regular diet resumed.  Culture remains negative and biopsy of brain lesion may prove needed - will watch through the weekend and if mentation does not continue to improve, will discuss with neurosurgery on Monday.  3/16 Patient states " she is okay today, mentation has waxed and waned without significant improvement.  She was febrile earlier today.  No other acute events.  3/17 Patient with significant improvement in mentation today, she is aware of her hospitalization, not falling asleep mid sentence.  Her pain mediations were held most of yesterday and likely far too strong for patient and were affecting her awareness.  Oxycodone 5 discontinued and will use tramadol instead unless pain not well controlled.  CT brain with contrast ordered as a quick scan for comparison to MRI.    3/18 Patient feeling same today, discussed need to discuss with neurosurgery for possible biopsy.  Discussed with Dr Nguyen, he reviewed MRI and CT brain and he feels they are likely metastatic lesions but are far too small to biopsy.  His recommendation is to continue with antibiotics and re-image in 2 weeks to see if any change that would be amenable to biopsy or that would further declare infection.  3/26 Patient mental status continues to wax and wane.  ID ordered MRI lumbar, pelvis and sacrum for evaluation for source of infection, patient with continued low back pain and no real improvement since I saw her 7 days prior with continued antibiotics during this time.  Cultures have not growth pathogen, repeat MRI 3/28.   3/27 Patient somnolent most of the day today, MRI's completed and showing 2 areas of fluid collections, given her history are likely abscesses and will require drainage, CT guided drainage requested and patient NPO at [facility] for this.  D/w ID - cultures will be done on fluid collection.  Repeat CT brain ordered for tomorrow.  3/28 Patient up to chair, diet resumed as lovenox given this am and drainage of abscesses deferred until tomorrow.  Vanco/merrem to continue and cultures will be collected from abscesses.  3/29 Patient went to IR today, had drain placed in the right buttock into abscess cavity and fluid sent for culture.  Potassium is slightly low,  continue with PO replacement.  HR has improved from yesterday but patient PO intake still not at normal level, increase IVF rate to 100cc/hour.  3/30 Patient without fever since drain placed right buttock.  Purulent material in ÁNGEL bulb. Cultures thus far are still showing no growth.  Antibiotics to continue.  CT head ordered to evaluate change in past 2 weeks as recommended by neurosurgery.  3/31 Patient with fevers overnight - was altered during this time but has recovered.  She denies pain when examined by me.  She still has purulent material in the drainage tubing.  Will continue with current IV antibiotics - 6 weeks total antibiotics suspected - end date would be 4/24/19.  D/w ID.  4/1 Patient remains intermittently somnolent.  She wakes easily but falls back to sleep quickly.  She remained afebrile overnight.  ÁNGEL drain continues to drain purulent material.  ABX through 4/24 - once accepting facility found, patient okay to transfer with midline intact for completion of antibiotics.  4/9 Patient somnolent when examined.  CT showed resolution of fluid collection where ÁNGEL in place.  RN to remove ÁNGEL today.  All cultures still remain without growth.  4/10 Patient seen when working with OT, she is awake but gets off task quickly and is easily fatigued.  ÁNGEL drain removed yesterday.  Patient states she has pain in the lumbar spine when she coughs - just above her surgical area.  She has been in bed for nearly 1 month and this is likely not helping her back pain - educated need for OOB as often as tolerated.  4/11 Patient had bowel incontinence prior to my entering room, states she thinks she needs a bed pan.  CNA notified.  She continues to have low back pain in the area of her surgery and given her bed-bound status during her infection and intermittent confusion, this is not unexpected.  Continuing with therapy recommended and patient is 1:1 feeding given her weakness to feed herself.  4/12 Patient tearful this am, she  is concerned that she will not walk again, support given and educated that she is weak and the inability to walk is only temporary.  She needs to work with therapy and get stronger and I need to stop her narcotics and only give tyelnol/ibuprofen for pain management along with non-narcotic alternatives.  I spoke to patient's brother, René, at her request.  4/13 Patient up to chair when evaluated, mentation improved with discontinuation of narcotics. Brother is coming to visit this weekend and she is looking forward to this.  Ortho recommendations still pending - per ID she spoke to Dr Porter who is consulting Dr Sandra for recommendations.  4/14 Patient up to chair with assistance today.  CT brain remains same as prior 2 weeks ago.  Still pending ortho consultation.  4/15 Patient to be seen by ortho today per Dr Porter.  D/w dietary - patient with weight loss and poor po intake, she needs to be a 1:1 feed patient and if she fails this, will likely need tube feeding to get adequate nutrition.    4/23 Patient up to chair with sister much of the morning and had uncontrolled pain following this.  One time dose of oxycodone given with great relief but patient sleeping since that dose.  Sister is concerned about her breathing as she is only taking small breaths at times, given need for re-imaging of abdomen/pelvis, will add on chest for further evaluation.  Lungs sounded clear on ascultation.  Brother also up visiting with his wife.  Ortho did consult, recommended hardware needs to remain in place for at least 6 months or non-union likely to occur, continue with antibiotics.  4/24 Patient in bed today, much less somnolent than yesterday as no narcotics given.  She complains of pain but still remains lethargic also at times.  Tylenol and ibuprofen to alternate for pain control.  ABX to continue, fluid collections very similar in size when compared to previous imaging - gluteal pocket is 18mm (20mm) and ilacus is  2.7cm from 3.4x2.0cm.  These fluid collections are still to small to drain.  MRI brain is pending.  4/25 Patient with many questions today, she asked why she speaks funny and is hard to understand, why her left leg now hurts, if she will walk again, etc.  She is also having difficulty sleeping at night again - I will increase her seroquel qhs dose from 25 to 37.5mg.  MRI results reviewed with patient, she will need at lease another 4 weeks of antibiotics and a repeat MRI brain around may 24th.  She is clinically stable and okay to transfer to [facility] once accepted and can likely travel via private vehicle as needed.  4/26 Patient awake and alert, she worked with therapies today and is still very weak but motivated to get out of the hospital.  She states the higher dose of seroquel last night did not help much and she only slept 4 hours last night, I will increase dose to 50mg tonight as she did not show signs of too much am sedation from increased dose the night prior.  4/27 Patient very awake today, she does have a slight slur to her words but is still intelligible.  Some words she speaks in Yoruba and others in English.  She knows she has a new diet after evaluation by SLP today.  Pain is worse today, will give single dose toradol to see if this helps without the somnolence.  4/28 Patient very anxious today, her midline catheter was leaking and because of this she's since been very worked up.  I've requested PICC placement with removal of midline catheter.  4/29 Patient less anxious today, still having difficulty with pain, would consider doing another CT scan tomorrow since it has been 1 week to see if there is recurrence/growth of the known abscesses or if this is just pain due to inactivity and bed bound status.  Patient will need repeat MRI brain in 3 more weeks to evaluate abscesses. PICC will be placed today.    Consultants/Specialty  ID - Hinojosa - s/o  Ortho - s/o    Code Status  full    Disposition  [facility] CA  vs [place]    Review of Systems  Review of Systems   Constitutional: Negative for chills and fever.   HENT: Negative for congestion and sore throat.    Eyes: Negative for blurred vision and photophobia.   Respiratory: Negative for cough and shortness of breath.    Cardiovascular: Negative for chest pain, claudication and leg swelling.   Gastrointestinal: Negative for abdominal pain, constipation, diarrhea, heartburn, nausea and vomiting.   Genitourinary: Negative for dysuria and hematuria.   Musculoskeletal: Positive for back pain (lumbar/sacral area ) and myalgias (right hip and buttock pain.). Negative for joint pain.   Skin: Negative for itching and rash.   Neurological: Negative for dizziness, sensory change, speech change, weakness and headaches.   Psychiatric/Behavioral: Negative for depression. The patient is nervous/anxious. The patient does not have insomnia.         Physical Exam  Temp:  [36.7 °C (98 °F)-37.1 °C (98.7 °F)] 37.1 °C (98.7 °F)  Pulse:  [] 100  Resp:  [18-20] 20  BP: (131-176)/(77-93) 156/77  SpO2:  [90 %-97 %] 90 %    Physical Exam   Constitutional: She is oriented to person, place, and time. She appears well-developed and well-nourished. No distress.   HENT:   Head: Normocephalic and atraumatic.   Eyes: Conjunctivae are normal. No scleral icterus.   Neck: Neck supple. No JVD present.   Cardiovascular: Normal rate, regular rhythm and normal heart sounds.  Exam reveals no gallop and no friction rub.    No murmur heard.  Pulmonary/Chest: Effort normal and breath sounds normal. No respiratory distress. She exhibits no tenderness.   Abdominal: Soft. Bowel sounds are normal. She exhibits no distension. There is no guarding.   Musculoskeletal: She exhibits no edema or tenderness.          Lymphadenopathy:     She has no cervical adenopathy.   Neurological: She is alert and oriented to person, place, and time. No cranial nerve deficit.   Mentation labile     Skin: Skin is warm and dry. She is not  diaphoretic. No erythema. No pallor.   Psychiatric: She has a normal mood and affect. Her behavior is normal.   Nursing note and vitals reviewed.      Fluids    Intake/Output Summary (Last 24 hours) at 04/29/19 1425  Last data filed at 04/29/19 0528   Gross per 24 hour   Intake              967 ml   Output              550 ml   Net              417 ml       Laboratory  Recent Labs      04/27/19   0032  04/28/19 0058  04/29/19   0012   WBC  5.9  3.9*  4.3*   RBC  4.32  4.08*  4.39   HEMOGLOBIN  11.3*  10.4*  11.3*   HEMATOCRIT  35.9*  33.8*  36.3*   MCV  83.1  82.8  82.7   MCH  26.2*  25.5*  25.7*   MCHC  31.5*  30.8*  31.1*   RDW  54.6*  58.9*  59.5*   PLATELETCT  125*  114*  103*   MPV  11.8  11.5  9.7     Recent Labs      04/27/19 0032  04/28/19 0058  04/29/19   0012   SODIUM  136  142  141   POTASSIUM  3.2*  3.0*  3.2*   CHLORIDE  105  109  108   CO2  26  25  26   GLUCOSE  72  83  76   BUN  15  16  14   CREATININE  0.54  0.50  0.48*   CALCIUM  9.1  8.9  8.6                   Imaging  MR-BRAIN-WITH & W/O   Final Result      Multiple punctate enhancing lesions seen throughout the supra and infratentorial brain parenchyma. When compared with the previous MRI there has been interval decrease in the size of the lesions. There are also interval reduction in the extent of the    surrounding white matter edema in the restricted diffusion consistent with improvement/treatment response of the most of the multifocal brain abscesses. However there are new enhancing lesions in the left basal ganglia and right cerebellum consistent    with interval new abscesses.Follow-up is recommended to ensure the complete resolution of the enhancing lesions. Please note that the radiological differential diagnosis of this multifocal lesions still includes metastasis. However decrease in the size    of the most of the lesions favors the diagnosis of infection.         CT-CHEST,ABDOMEN,PELVIS WITH   Final Result      1.  Limited  evaluation of the thorax due to extensive patient respiratory motion artifact.      2.  Borderline enlarged bilateral axillary lymph nodes.      3.  Linear atelectasis within the lungs with patchy groundglass opacity bilaterally.      4.  Again seen screws extending through the sacrum bilaterally. There is surrounding lucency and fragmentation of the sacrum as well as the right greater than left ilium and a right iliac fracture. It is uncertain if these represent metastatic lesions    with pathologic fracture versus insufficiency fractures.      5.  Fluid collections within the right gluteal and iliacis muscles.. The collection within the right gluteal muscle has unchanged while the fluid collection within the right iliacis muscle is increased somewhat in size. Differential diagnosis includes    hematoma as well as abscess.         6.  Enlarged retroperitoneal, periportal and portacaval lymph nodes.      DX-CHEST-LIMITED (1 VIEW)   Final Result      No acute cardiac or pulmonary abnormalities are identified. Lung volumes are low.      CT-ABDOMEN-PELVIS WITH   Final Result         1.  Enhancing fluid collection in the right gluteal muscle, appearance compatible with small abscess.   2.  Loculated enhancing fluid collection in the right iliac is muscle, corresponding with site of prior drain, appearance compatible with reaccumulation of abscess.   3.  Fluid-filled distention of small bowel suggests ileus   4.  Atherosclerosis   5.  Trace pericardial effusion      CT-CTA CHEST PULMONARY ARTERY W/ RECONS   Final Result         1.  No large central pulmonary embolus is appreciated, evaluation of the subsegmental branches is essentially nondiagnostic due to motion artifacts. Additional imaging would be required for definitive exclusion of small distal pulmonary emboli.   2.  Trace pericardial effusion   3.  Hepatomegaly   4.  Atherosclerosis.   5.  Right pulmonary nodules, the largest is a 7.7 mm pulmonary nodule, see  nodule follow-up recommendations below.      Low Risk: CT at 3-6 months, then consider CT at 18-24 months      High Risk: CT at 3-6 months, then at 18-24 months      Comments: Use most suspicious nodule as guide to management. Follow-up intervals may vary according to size and risk.      Low Risk - Minimal or absent history of smoking and of other known risk factors.      High Risk - History of smoking or of other known risk factors.      Note: These recommendations do not apply to lung cancer screening, patients with immunosuppression, or patients with known primary cancer.      Fleischner Society 2017 Guidelines for Management of Incidentally Detected Pulmonary Nodules in Adults         DX-CHEST-PORTABLE (1 VIEW)   Final Result         1.  Mild pulmonary edema and/or infiltrate   2.  Cardiomegaly      CT-HEAD WITH & W/O   Final Result      1.  No change in scattered hyperenhancing lesions throughout the brain, cerebellum and visualized brainstem. Some of them demonstrate mild associated edema, similar to prior study.   2.  No CT evidence of infarct or hemorrhage.      CT-NEEDLE BX-MUSCLE RIGHT   Final Result   Addendum 1 of 1   Addendum:   The biopsy/drainage was done utilizing low dose optimization CT techniques    including auto modulation for  imaging and low mA CT/fluoroscopy mode    for the procedure.      Final      CT-guided aspiration of pus like material in the right gluteal muscle lesion.      CT-ABDOMEN-PELVIS WITH   Final Result      1.  Resolution of right iliacus abscess with no associated fluid adjacent to the drainage catheter      2.  Interval removal of right gluteal drainage catheter      3.  Surgical screw traversing both sacroiliac joint with associated pathologic fracture of the right ilium and right medial sacrum      4.  Hepatomegaly      CT-DRAIN-HEMATOMA - SEROMA   Final Result      1.  CT-GUIDED RETROPERITONEAL (EXTRAPERITONEAL) RIGHT PELVIC ABSCESS DRAINAGE AT THE RIGHT ILIOPSOAS.  ORDERS WERE WRITTEN FOR ONCE DAILY IRRIGATION OF THE CATHETER WITH 5 ML STERILE SALINE.      2.  THE CURRENT PLAN IS TO MONITOR DRAINAGE OUTPUT AND OBTAIN A FOLLOWUP CT SCAN IN 5-7 DAYS IF CLINICALLY INDICATED.      CT-ABDOMEN-PELVIS WITH   Final Result      1.  Rim-enhancing fluid collection in the right iliopsoas muscle may have increased in size slightly from the prior exam.      2.  Pigtail catheter in the right gluteus medius muscle. No residual fluid collection is appreciated.      3.  Pathologic fracture of the right ilium. Status post right SI joint fusion.      4.  Atherosclerosis.      5.  Cholelithiasis.      CT-HEAD WITH & W/O   Final Result      1.  Interval decrease in size and level of enhancement of multiple small punctate foci in both cerebral hemispheres and in the midbrain consistent with improving septic emboli.      2.  No hemorrhage or mass effect.      CT-DRAIN-RETROPERITONEAL   Final Result      1.  CT GUIDED CATHETER DRAINAGE OF RIGHT GLUTEAL ABSCESS.   2.  THE CURRENT PLAN IS TO OBTAIN A FOLLOW-UP CT SCAN IN 5-7 DAYS IF INDICATED. ORDERS WERE WRITTEN FOR ONCE DAILY IRRIGATION OF THE CATHETER WITH 5 ML STERILE SALINE FOR 3 DAYS.   3. THE ILIOPSOAS COLLECTION WAS NOT AMENABLE TO CATHETER DRAINAGE AS INTERVENING BOWEL AND/OR BONY STRUCTURES OBSTRUCT A PATHWAY TO THIS COLLECTION AT THIS TIME.      MR-LUMBAR SPINE-WITH & W/O   Final Result      1.  There are multifocal enhancing fluid collections in the right iliopsoas muscles and gluteus muscles adjacent to the right ilium. Right iliopsoas fluid collection measures an approximately 3.9 x 2.9 cm. The right gluteal fluid collection measures an    approximately 3.5 x 2.8 cm in size. The differential diagnosis includes postsurgical seroma and abscess.   2.  Post operative and degenerative changes as described above.      MR-PELVIS-WITH & W/O AND SEQUENCES   Final Result      1.  Surgical screw traverses both sacroiliac joint across presumed  pathologic fracture of the right sacrum and the hardware obscures the adjacent tissues.      2.  No other evidence for bony metastatic disease      3.  Right gluteal musculature rim-enhancing fluid collection measuring 3.4 x 2.8 cm. This could be a postoperative seroma versus abscess      4.  osteoarthritis of both hip joint      5.  Prior hysterectomy      IR-MIDLINE CATHETER INSERTION WO GUIDANCE > AGE 3   Final Result                  Ultrasound-guided midline placement performed by qualified nursing staff    as above.          CT-HEAD WITH   Final Result      Multiple subcentimeter too numerous to count enhancing masses scattered throughout the supratentorial and infratentorial brain. These demonstrate some surrounding vasogenic edema and most likely represent multifocal metastatic lesions. Other    considerations would include multifocal abscesses or septic emboli.      EC-CHAS W/O CONT   Final Result      EC-CHAS W/O CONT   Final Result      EC-ECHOCARDIOGRAM COMPLETE W/O CONT   Final Result      OUTSIDE IMAGES-CT CHEST/ABDOMEN/PELVIS   Final Result      OUTSIDE IMAGES-DX CHEST   Final Result      ZK-[id number] FILM X-RAY   Final Result      OUTSIDE IMAGES-MR BRAIN   Final Result      OUTSIDE IMAGES-MR BRAIN   Final Result      IR-PICC LINE PLACEMENT W/ GUIDANCE > AGE 5    (Results Pending)        Assessment/Plan  * Lesion of brain   Assessment & Plan    IR Bx of psoas muscle ordered - was abscess  Continued treatment of infection  TTE and CHAS negative for vegetations.  MRI brain to repeat  Palliative consulted.  Vanco/merrem per ID  MRI brain 4/24 showing improvement old lesions but new lesions in right cerebellum and left basal ganglion  MRI brain due in 1 month for re-evaluation of duration of antibiotics, currently plan to continue for at least 4 more weeks.         Encephalopathy   Assessment & Plan    Waxes and wanes.  Likely secondary to multiple brain lesions.  Avoid benzodiazepines and/or  anticholinergic medications.  Avoid sedatives or hypnotics.  At baseline for now.       Mass of iliopsoas muscle group   Assessment & Plan    Return of fluid collection, repeat drainage in IR with cultures, drain placed 3/29 - 10ml purulent material drained and sent for culture - no growth a/o 3/30  Was on linezolid and cefepime and now on vanco/merrem - end date to be > 6 weeks of treatment as now clinical resolution needed  ID following   CHAS negative for endocarditis.  Patient has sacral screws, Orthopedic surgery recommended hardware to stay for at least 6 months to promote adequate healing and to continue with antibiotics for now.  4/20: Patient did not qualify for IR aspiration and drain placement for the above-mentioned abscesses as they were too small.  repeat CT abdomen/pelvis 4/23 - fluid collections still too small to drain  MRI brain - old lesions improving but unfortunately new lesions right cerebellum and left basal ganglion.       Pancytopenia (HCC)   Assessment & Plan    Could be due to toxic effects of infection on bone marrow.  Afebrile.  ANC within normal limits.  Continue to monitor.  Gradually improving.  Resolved although normal white blood cell count and normal ANC.  Mild thrombocytopenia.       Hyponatremia- (present on admission)   Assessment & Plan    Mildly low - doubt contribution to mentation         RLS (restless legs syndrome)- (present on admission)   Assessment & Plan    Pramipexole       Diabetes mellitus type 2 in obese (HCC)- (present on admission)   Assessment & Plan    Well controlled   Insulin sliding scale with hypoglycemia protocol.  Continue to monitor       History of breast cancer- (present on admission)   Assessment & Plan    Status post mastectomy.  Repeat CT showed decrease in size with antibiotics wishes consistent with abscesses rather than metastasis.  Continue IV antibiotis  Appreciate infectious disease recommendations.       COPD (chronic obstructive pulmonary  disease) (HCC)- (present on admission)   Assessment & Plan    Currently maintaining saturation on room air.  Respiratory therapy per protocol.     Dysphagia   Assessment & Plan    Dysphagia I, nectar thick liquids per speech eval 4/27       Insomnia   Assessment & Plan    seroquel qhs 50mg       Hypercalcemia- (present on admission)   Assessment & Plan    Mild   Continue with Sensipar.  Monitor and adjust Sensipar dose accordingly.       Sinus tachycardia- (present on admission)   Assessment & Plan    Likely due to combination of infection and volume loss.  Echocardiogram with normal ejection fraction and no significant valvular abnormalities.  Tachycardia has resolved     Urinary tract infection   Assessment & Plan    Culture remain no growth.       Fungal infection of the groin   Assessment & Plan    Resolved         History of deep vein thrombosis- (present on admission)   Assessment & Plan    History of deep vein thrombosis   Was on Rivaroxaban as outpatient.     Continues to be on hold as interventions often with drain placement/removal  Start prophylactic lovenox once no further interventions needed.           Essential hypertension- (present on admission)   Assessment & Plan    Restarted on losartan and furosemide with hold parameters.  Continue to monitor     Fever   Assessment & Plan    PRN Tylenol.  Continue monitor     GERD (gastroesophageal reflux disease)- (present on admission)   Assessment & Plan    Omeprazole     Chronic pain- (present on admission)   Assessment & Plan     Multifactorial  Likely secondary to abscess, fracture, bed bound status  Pain control, avoid narcotics given severe impact on patient's mentation.              VTE prophylaxis: scds

## 2019-04-29 NOTE — DISCHARGE PLANNING
Brother Arsh agreed to transfer PT to LTAC in [place] due to Medicare will not pay for transfer to LTAC in [place] if there is one closer in [place]. Choice for [facility]PH sent to [facility] at [phone]. Explained to Arsh that TPH rep will come into the hospital and assess PT then make the determination once this is completed she will be set up for transport, also this RNCM stated she will have the rep contact Arsh after she assess PT.

## 2019-04-29 NOTE — PROCEDURES
Vascular Access Team     Date of Insertion: 4/29/19  Arm Circumference: 26  Internal length: 39  External Length: 1  Vein Occupancy %: 38  Reason for PICC: antibiotic therapy   Labs: WBC 4.3, , BUN 14, Cr 0.48, GFR >60, INR na    Consents confirmed, vessel patency confirmed with ultrasound. Risks and benefits of procedure explained to patient and education regarding central line associated bloodstream infections provided. Questions answered.     PICC placed via exchange procedure in RUE per MD order with ultrasound guidance, initial arm circumference 26cm. 4 Fr, single lumen PICC placed in brachial vein after 1 attempt(s). 2 cc's of 1% lidocaine injected intradermally. Guidewire inserted through previously placed line which was then removed in a sterile manner.  Dilator sheath inserted over guidewire, which was then removed before placing a new line. 40 cm catheter inserted with good blood return. Secured at 1 cm marker. Each lumen flushed without resistance with 10 mL 0.9% normal saline. PICC line secured with Biopatch and Tegaderm.     PICC placement is confirmed by nurse using 3CG technology. PICC line is appropriate for use at this time. Patient tolerated procedure well, without complications.  Patient condition relayed to unit RN or ordering physician via this post procedure note in the EMR.     Ultrasound images uploaded to PACS and viewable in the EMR - yes  Ultrasound imaged printed and placed in paper chart - no     BARD Power PICC ref # [id number], Lot # [id number]

## 2019-04-29 NOTE — DISCHARGE PLANNING
Received Choice form at 1440  Agency/Facility Name: [facility]  Referral sent per Choice form at 1444

## 2019-04-29 NOTE — PROGRESS NOTES
Received report & assumed care of patient at 1900. Assessment completed. Patient is A&Ox3, disoriented to time. Pt is max assist, patient is incontinent, will sometimes request bedpan or urinal. R midline patent, infusing NS at  TKO. POC discussed & questions answered. Bed locked and in lowest position, bed alarm is on, call light within reach, non-skid socks in place, hourly rounding. Patient reports no further needs at this time.

## 2019-04-29 NOTE — PROGRESS NOTES
Pharmacy Kinetics 68 y.o. female on vancomycin day # 36 2019    Currently on Vancomycin 600 mg iv q24hr    Indication for Treatment: possible brain abscesses/ iliopsoas abscess     Pertinent history per medical record: Admitted on 3/8/2019 for iliopsoas mass and multiple brain lesions. There is concern that this is infectious. All cultures are negative to date, but were all drawn while on antibiotics. CHAS was negative. Patient has a history of breast cancer and DM. ID is following. Plan for completion of abx (6 weeks) in house prior to transfer to [facility] in [place]. 19 Per ID antibiotic therapy extended for at least an additional 4 weeks.     Other antibiotics: Meropenem 2g IV q8h     Allergies: Patient has no known allergies.      List concerns for renal function: age, and prolonged vancomycin duration, elevated BUN/SCr ratio, low albumin     Pertinent cultures to date:   19: wound - right gluteal mass: negative   19: blood - peripheral x 2: negative   19: wound - right buttock: negative   19: blood - peripheral x 2: negative   19: wound - retroperitoneal drain aspirate: negative   19:PBCx2:NGTD     MRSA nares swab if pneumonia is a concern (ordered/positive/negative/n-a): n/a    Recent Labs      19   0032  19   0058  19   0012   WBC  5.9  3.9*  4.3*   NEUTSPOLYS  66.10  49.40  54.80     Recent Labs      19   0032  19   0058  19   0012   BUN  15  16  14   CREATININE  0.54  0.50  0.48*   ALBUMIN  2.6*  2.6*  2.9*     Recent Labs      19   2038   VANCOTROUGH  14.4     Intake/Output Summary (Last 24 hours) at 19 1125  Last data filed at 19 0536   Gross per 24 hour   Intake             1087 ml   Output              550 ml   Net              537 ml      /77   Pulse 100   Temp 37.1 °C (98.7 °F) (Temporal)   Resp 20   Ht 1.524 m (5')   Wt 65.3 kg (143 lb 15.4 oz)   SpO2 90%  Temp (24hrs), Av.9 °C (98.5 °F),  "Min:36.7 °C (98 °F), Max:37.1 °C (98.7 °F)      A/P   1. Vancomycin dose change: none  2. Next vancomycin level: ~ 4 days or sooner if indicated  3. Goal trough: 16-20 mcg/mL  4. A/P: Renal indices appear stable. No s/s of vancomycin toxicity present at this time. MRI brain 4/24 shows \"new enhancing lesions in the left basal ganglia and right cerebellum consistent with interval new abscesses..\" Antibiotics extended an additional four weeks. Anticipate repeat MRI brain week of May 20th per ID. ID signed off. DC planning to LTAC.Continue current regimen.  Pharmacy to monitor and adjust as needed per protocol.       ZULEIKA Bojorquez, Pharm.D.    "

## 2019-04-29 NOTE — CARE PLAN
Problem: Safety  Goal: Will remain free from injury  Outcome: PROGRESSING AS EXPECTED  Bed/chair alarm, bed position low, call light within reach, treaded footwear, fall risk education    Problem: Infection  Goal: Will remain free from infection  Outcome: PROGRESSING AS EXPECTED  Hand hygiene and infection prevention education

## 2019-04-29 NOTE — PROGRESS NOTES
Bedside report received RN to RN with patient. Assuming care [id number].  Patient is sleeping at this time

## 2019-04-30 ENCOUNTER — APPOINTMENT (OUTPATIENT)
Dept: RADIOLOGY | Facility: MEDICAL CENTER | Age: 69
End: 2019-04-30
Attending: INTERNAL MEDICINE
Payer: MEDICARE

## 2019-04-30 ENCOUNTER — HOSPITAL ENCOUNTER (OUTPATIENT)
Facility: MEDICAL CENTER | Age: 69
End: 2019-05-24
Attending: INTERNAL MEDICINE | Admitting: INTERNAL MEDICINE
Payer: MEDICARE

## 2019-04-30 VITALS
SYSTOLIC BLOOD PRESSURE: 144 MMHG | OXYGEN SATURATION: 97 % | WEIGHT: 143.96 LBS | HEART RATE: 97 BPM | BODY MASS INDEX: 28.26 KG/M2 | RESPIRATION RATE: 18 BRPM | DIASTOLIC BLOOD PRESSURE: 65 MMHG | TEMPERATURE: 97 F | HEIGHT: 60 IN

## 2019-04-30 LAB
ALBUMIN SERPL BCP-MCNC: 2.7 G/DL (ref 3.2–4.9)
ALBUMIN/GLOB SERPL: 0.7 G/DL
ALP SERPL-CCNC: 307 U/L (ref 30–99)
ALT SERPL-CCNC: 39 U/L (ref 2–50)
ANION GAP SERPL CALC-SCNC: 8 MMOL/L (ref 0–11.9)
AST SERPL-CCNC: 91 U/L (ref 12–45)
BASOPHILS # BLD AUTO: 1 % (ref 0–1.8)
BASOPHILS # BLD: 0.03 K/UL (ref 0–0.12)
BILIRUB SERPL-MCNC: 0.6 MG/DL (ref 0.1–1.5)
BUN SERPL-MCNC: 16 MG/DL (ref 8–22)
CALCIUM SERPL-MCNC: 8.5 MG/DL (ref 8.5–10.5)
CHLORIDE SERPL-SCNC: 112 MMOL/L (ref 96–112)
CO2 SERPL-SCNC: 26 MMOL/L (ref 20–33)
CREAT SERPL-MCNC: 0.45 MG/DL (ref 0.5–1.4)
EOSINOPHIL # BLD AUTO: 0.53 K/UL (ref 0–0.51)
EOSINOPHIL NFR BLD: 17.2 % (ref 0–6.9)
ERYTHROCYTE [DISTWIDTH] IN BLOOD BY AUTOMATED COUNT: 61.7 FL (ref 35.9–50)
GLOBULIN SER CALC-MCNC: 3.9 G/DL (ref 1.9–3.5)
GLUCOSE SERPL-MCNC: 78 MG/DL (ref 65–99)
HCT VFR BLD AUTO: 33.5 % (ref 37–47)
HGB BLD-MCNC: 10.2 G/DL (ref 12–16)
IMM GRANULOCYTES # BLD AUTO: 0.02 K/UL (ref 0–0.11)
IMM GRANULOCYTES NFR BLD AUTO: 0.6 % (ref 0–0.9)
LYMPHOCYTES # BLD AUTO: 0.67 K/UL (ref 1–4.8)
LYMPHOCYTES NFR BLD: 21.7 % (ref 22–41)
MCH RBC QN AUTO: 25.8 PG (ref 27–33)
MCHC RBC AUTO-ENTMCNC: 30.4 G/DL (ref 33.6–35)
MCV RBC AUTO: 84.8 FL (ref 81.4–97.8)
MONOCYTES # BLD AUTO: 0.49 K/UL (ref 0–0.85)
MONOCYTES NFR BLD AUTO: 15.9 % (ref 0–13.4)
NEUTROPHILS # BLD AUTO: 1.35 K/UL (ref 2–7.15)
NEUTROPHILS NFR BLD: 43.6 % (ref 44–72)
NRBC # BLD AUTO: 0 K/UL
NRBC BLD-RTO: 0 /100 WBC
PLATELET # BLD AUTO: 104 K/UL (ref 164–446)
PMV BLD AUTO: 12.2 FL (ref 9–12.9)
POTASSIUM SERPL-SCNC: 3.8 MMOL/L (ref 3.6–5.5)
PROT SERPL-MCNC: 6.6 G/DL (ref 6–8.2)
RBC # BLD AUTO: 3.95 M/UL (ref 4.2–5.4)
SODIUM SERPL-SCNC: 146 MMOL/L (ref 135–145)
VANCOMYCIN TROUGH SERPL-MCNC: 9 UG/ML (ref 10–20)
WBC # BLD AUTO: 3.1 K/UL (ref 4.8–10.8)

## 2019-04-30 PROCEDURE — 97116 GAIT TRAINING THERAPY: CPT

## 2019-04-30 PROCEDURE — 71045 X-RAY EXAM CHEST 1 VIEW: CPT

## 2019-04-30 PROCEDURE — 700102 HCHG RX REV CODE 250 W/ 637 OVERRIDE(OP): Performed by: FAMILY MEDICINE

## 2019-04-30 PROCEDURE — 700111 HCHG RX REV CODE 636 W/ 250 OVERRIDE (IP): Performed by: FAMILY MEDICINE

## 2019-04-30 PROCEDURE — 97530 THERAPEUTIC ACTIVITIES: CPT

## 2019-04-30 PROCEDURE — 700111 HCHG RX REV CODE 636 W/ 250 OVERRIDE (IP): Performed by: INTERNAL MEDICINE

## 2019-04-30 PROCEDURE — 700102 HCHG RX REV CODE 250 W/ 637 OVERRIDE(OP): Performed by: INTERNAL MEDICINE

## 2019-04-30 PROCEDURE — A9270 NON-COVERED ITEM OR SERVICE: HCPCS | Performed by: FAMILY MEDICINE

## 2019-04-30 PROCEDURE — 80053 COMPREHEN METABOLIC PANEL: CPT

## 2019-04-30 PROCEDURE — 99239 HOSP IP/OBS DSCHRG MGMT >30: CPT | Performed by: HOSPITALIST

## 2019-04-30 PROCEDURE — 700105 HCHG RX REV CODE 258: Performed by: INTERNAL MEDICINE

## 2019-04-30 PROCEDURE — 92526 ORAL FUNCTION THERAPY: CPT

## 2019-04-30 PROCEDURE — 87641 MR-STAPH DNA AMP PROBE: CPT

## 2019-04-30 PROCEDURE — A9270 NON-COVERED ITEM OR SERVICE: HCPCS | Performed by: INTERNAL MEDICINE

## 2019-04-30 PROCEDURE — 80202 ASSAY OF VANCOMYCIN: CPT

## 2019-04-30 PROCEDURE — 85025 COMPLETE CBC W/AUTO DIFF WBC: CPT

## 2019-04-30 RX ORDER — LABETALOL 100 MG/1
100 TABLET, FILM COATED ORAL EVERY 8 HOURS PRN
Qty: 60 TAB | Refills: 0 | Status: ON HOLD | OUTPATIENT
Start: 2019-04-30 | End: 2019-05-29

## 2019-04-30 RX ORDER — LOPERAMIDE HYDROCHLORIDE 2 MG/1
2 CAPSULE ORAL 4 TIMES DAILY PRN
Qty: 30 CAP | Refills: 0 | Status: ON HOLD | OUTPATIENT
Start: 2019-04-30 | End: 2019-05-29

## 2019-04-30 RX ORDER — ONDANSETRON 4 MG/1
4 TABLET, ORALLY DISINTEGRATING ORAL EVERY 4 HOURS PRN
Qty: 10 TAB | Refills: 0 | Status: ON HOLD | OUTPATIENT
Start: 2019-04-30 | End: 2019-05-23

## 2019-04-30 RX ORDER — QUETIAPINE FUMARATE 50 MG/1
50 TABLET, FILM COATED ORAL EVERY EVENING
Qty: 60 TAB | Refills: 3 | Status: ON HOLD | OUTPATIENT
Start: 2019-04-30 | End: 2019-05-23

## 2019-04-30 RX ORDER — IBUPROFEN 600 MG/1
600 TABLET ORAL EVERY 6 HOURS PRN
Qty: 30 TAB | Refills: 0 | Status: ON HOLD | OUTPATIENT
Start: 2019-04-30 | End: 2019-05-23

## 2019-04-30 RX ORDER — HYDRALAZINE HYDROCHLORIDE 20 MG/ML
20 INJECTION INTRAMUSCULAR; INTRAVENOUS EVERY 6 HOURS PRN
Qty: 1 VIAL | Refills: 0 | Status: ON HOLD | OUTPATIENT
Start: 2019-04-30 | End: 2019-05-29

## 2019-04-30 RX ORDER — CINACALCET 60 MG/1
60 TABLET, FILM COATED ORAL DAILY
Qty: 30 TAB | Refills: 0 | Status: ON HOLD | OUTPATIENT
Start: 2019-05-01 | End: 2019-05-29

## 2019-04-30 RX ORDER — AMLODIPINE BESYLATE 10 MG/1
10 TABLET ORAL DAILY
Qty: 30 TAB | Refills: 0 | Status: ON HOLD | OUTPATIENT
Start: 2019-05-01 | End: 2019-05-29

## 2019-04-30 RX ORDER — POTASSIUM CHLORIDE 20 MEQ/1
40 TABLET, EXTENDED RELEASE ORAL 2 TIMES DAILY
Qty: 60 TAB | Refills: 11 | Status: ON HOLD | OUTPATIENT
Start: 2019-04-30 | End: 2019-05-23

## 2019-04-30 RX ADMIN — AMLODIPINE BESYLATE 10 MG: 10 TABLET ORAL at 05:53

## 2019-04-30 RX ADMIN — CINACALCET HYDROCHLORIDE 60 MG: 30 TABLET, FILM COATED ORAL at 05:54

## 2019-04-30 RX ADMIN — MEROPENEM 2 G: 1 INJECTION, POWDER, FOR SOLUTION INTRAVENOUS at 03:48

## 2019-04-30 RX ADMIN — ACETAMINOPHEN 650 MG: 325 TABLET, FILM COATED ORAL at 09:14

## 2019-04-30 RX ADMIN — HYDRALAZINE HYDROCHLORIDE 20 MG: 20 INJECTION INTRAMUSCULAR; INTRAVENOUS at 03:46

## 2019-04-30 RX ADMIN — MEROPENEM 2 G: 1 INJECTION, POWDER, FOR SOLUTION INTRAVENOUS at 12:08

## 2019-04-30 RX ADMIN — OMEPRAZOLE 20 MG: 20 CAPSULE, DELAYED RELEASE ORAL at 05:52

## 2019-04-30 RX ADMIN — KETOROLAC TROMETHAMINE 15 MG: 30 INJECTION, SOLUTION INTRAMUSCULAR at 03:57

## 2019-04-30 RX ADMIN — KETOROLAC TROMETHAMINE 15 MG: 30 INJECTION, SOLUTION INTRAMUSCULAR at 12:08

## 2019-04-30 RX ADMIN — LOSARTAN POTASSIUM 50 MG: 50 TABLET ORAL at 05:53

## 2019-04-30 RX ADMIN — POTASSIUM CHLORIDE 40 MEQ: 1500 TABLET, EXTENDED RELEASE ORAL at 05:53

## 2019-04-30 RX ADMIN — GABAPENTIN 300 MG: 300 CAPSULE ORAL at 05:53

## 2019-04-30 RX ADMIN — IBUPROFEN 600 MG: 400 TABLET, FILM COATED ORAL at 06:04

## 2019-04-30 ASSESSMENT — GAIT ASSESSMENTS
GAIT LEVEL OF ASSIST: MODERATE ASSIST
ASSISTIVE DEVICE: FRONT WHEEL WALKER
DISTANCE (FEET): 10

## 2019-04-30 ASSESSMENT — COGNITIVE AND FUNCTIONAL STATUS - GENERAL
MOBILITY SCORE: 9
CLIMB 3 TO 5 STEPS WITH RAILING: TOTAL
MOVING FROM LYING ON BACK TO SITTING ON SIDE OF FLAT BED: UNABLE
STANDING UP FROM CHAIR USING ARMS: A LOT
WALKING IN HOSPITAL ROOM: A LOT
TURNING FROM BACK TO SIDE WHILE IN FLAT BAD: A LOT
SUGGESTED CMS G CODE MODIFIER MOBILITY: CM
MOVING TO AND FROM BED TO CHAIR: UNABLE

## 2019-04-30 NOTE — PROGRESS NOTES
Pharmacy Kinetics 68 y.o. female on vancomycin day # 37 2019    Currently on Vancomycin 600 mg iv q24hr     Indication for Treatment: possible brain abscesses/ iliopsoas abscess     Pertinent history per medical record: Admitted on 3/8/2019 for iliopsoas mass and multiple brain lesions. There is concern that this is infectious. All cultures are negative to date, but were all drawn while on antibiotics. CHAS was negative. Patient has a history of breast cancer and DM. ID is following. Plan for completion of abx (6 weeks) in house prior to transfer to [facility] in [place]. 19 Per ID antibiotic therapy extended for at least an additional 4 weeks.     Other antibiotics: Meropenem 2g IV q8h     Allergies: Patient has no known allergies.      List concerns for renal function: age, and prolonged vancomycin duration, elevated BUN/SCr ratio, low albumin     Pertinent cultures to date:   19: wound - right gluteal mass: negative   19: blood - peripheral x 2: negative   19: wound - right buttock: negative   19: blood - peripheral x 2: negative   19: wound - retroperitoneal drain aspirate: negative   19:PBCx2:NGTD     MRSA nares swab if pneumonia is a concern (ordered/positive/negative/n-a): n/a    Recent Labs      19   0058  19   0012  19   0000   WBC  3.9*  4.3*  3.1*   NEUTSPOLYS  49.40  54.80  43.60*     Recent Labs      19   0058  19   0012  19   0345   BUN  16  14  16   CREATININE  0.50  0.48*  0.45*   ALBUMIN  2.6*  2.9*  2.7*     Recent Labs      19   2038   VANCOTROUGH  14.4     Intake/Output Summary (Last 24 hours) at 19 0945  Last data filed at 19 0823   Gross per 24 hour   Intake              240 ml   Output                0 ml   Net              240 ml      /65   Pulse 97   Temp 36.1 °C (97 °F) (Oral)   Resp 18   Ht 1.524 m (5')   Wt 65.3 kg (143 lb 15.4 oz)   SpO2 97%  Temp (24hrs), Av.5 °C (97.7 °F),  "Min:36.1 °C (97 °F), Max:36.8 °C (98.2 °F)      A/P   1. Vancomycin dose change: none  2. Next vancomycin level: ~ 3 days or sooner if indicated  3. Goal trough: 16-20 mcg/mL  4. A/P: Renal indices appear stable with adequate voids. No s/s of vancomycin toxicity present at this time. MRI brain 4/24 shows \"new enhancing lesions in the left basal ganglia and right cerebellum consistent with interval new abscesses..\" Antibiotics extended an additional four weeks. Anticipate repeat MRI brain week of May 20th per ID. ID signed off. DC planning to LTAC.Continue current regimen.  Pharmacy to monitor and adjust as needed per protocol.       ZULEIKA Bojoruqez, Pharm.D.    "

## 2019-04-30 NOTE — THERAPY
"Speech Language Therapy dysphagia treatment completed.     Functional Status: Patient was seen on this date for dysphagia treatment with a D1/NTL meal tray during lunch. Patient awake and grimacing upon repositioning to upright position. Pt c/o pain 8/10 in L leg - RN was notified. Patient consumed ~50% of D1 textures, <50% of creamy soup, and >90% of thickened boost given direct assistance with feeding from SLP. No overt s/sx of aspiration appreciated with across consistencies and textures from D1/NTL meal tray. Patient requesting \"real food\" and PO trials of soft solids were administered. Mastication was minimal and prolonged and resulted in mod oral residue. Patient is not at the level for a diet upgrade but appears to be tolerating current diet of D1/NTL diet given 1:1 feeding.     Recommendations: continue D1/NTL given 1:1 feeding     Plan of Care: Will benefit from Speech Therapy 5 times per week    Post-Acute Therapy: Recommend inpatient transitional care services for continued speech therapy services.      See \"Rehab Therapy-Acute\" Patient Summary Report for complete documentation.     "

## 2019-04-30 NOTE — DISCHARGE SUMMARY
Discharge Summary    CHIEF COMPLAINT ON ADMISSION  No chief complaint on file.      Reason for Admission  metastatic brain cancer     Admission Date  3/8/2019    CODE STATUS  DNAR/DNI    HPI & HOSPITAL COURSE  This is a 68 y.o. female here with history of breast cancer bilateral mastectomy status post chemotherapy, history of COPD, diabetes, hypertension, depression, anemia right hip pain, fever and abnormal brain mri.  She presented from an SNF facility.  She was having febrile fevers up to 104 and was confused.  She was started on empiric antibiotics.  She was started on Zosyn she did MRI of the brain death on multiple lesions that may have limited stasis.  She underwent a biopsy that found multiple abscesses but the cultures were negative.  She underwent a CHAS that found no evidence of endocarditis.  Neurosurgery was consulted for possible brain biopsy but said that the lesions were too small.  No source of infection was found on lumbar MRI.  ID was consulted and said the total course of antibiotics as per 6 weeks.  The pelvic abscess was managed by ÁNGEL drain and IV antibiotics.  CT scan found resolution of fluid collection where the ÁNGEL drain was placed and subsequently was removed.  Repeat MRI of the brain found the lesions to be smaller and will continue additional 4 weeks of antibiotics.  She will have another brain MRI May 24th to see if the infection is cleared or will need additional antibiotics.  PICC line was placed after her blood cultures were negative.  She was having slurred speech and determined that her diet should be dysphagia 1 nectar thick.  She will be discharged to an LTAC facility.       Therefore, she is discharged in fair and stable condition to a long-term acute care hospital.    The patient met 2-midnight criteria for an inpatient stay at the time of discharge.    Discharge Date  4/30/2019    FOLLOW UP ITEMS POST DISCHARGE  Brain MRI 4/24  Antibiotics 4/25 if MRI fund persistent lesions  will need prolonged course.     DISCHARGE DIAGNOSES  Principal Problem:    Lesion of brain POA: Unknown  Active Problems:    Mass of iliopsoas muscle group POA: Unknown    Encephalopathy POA: Clinically Undetermined    COPD (chronic obstructive pulmonary disease) (HCC) POA: Yes    History of breast cancer POA: Yes    Diabetes mellitus type 2 in obese (HCC) POA: Yes    RLS (restless legs syndrome) POA: Yes    Hyponatremia POA: Yes    Pancytopenia (HCC) POA: Unknown    Chronic pain POA: Yes    GERD (gastroesophageal reflux disease) POA: Yes    Fever POA: Unknown    Essential hypertension POA: Yes    History of deep vein thrombosis POA: Yes    Fungal infection of the groin POA: Unknown    Urinary tract infection POA: Unknown    Sinus tachycardia POA: Yes    Hypercalcemia POA: Yes    Insomnia POA: Unknown    Dysphagia POA: Unknown  Resolved Problems:    * No resolved hospital problems. *      FOLLOW UP  No future appointments.  [facility]  [address]  [place] [address]  [phone]          MEDICATIONS ON DISCHARGE     Medication List      START taking these medications      Instructions   amLODIPine 10 MG Tabs  Start taking on:  5/1/2019  Commonly known as:  NORVASC   Take 1 Tab by mouth every day.  Dose:  10 mg     Cinacalcet HCl 60 MG Tabs  Start taking on:  5/1/2019   Take 1 Tab by mouth every day.  Dose:  60 mg     hydrALAZINE 20 MG/ML Soln  Commonly known as:  APRESOLINE   1 mL by Intravenous route every 6 hours as needed.  Dose:  20 mg     ibuprofen 600 MG Tabs  Commonly known as:  MOTRIN   Take 1 Tab by mouth every 6 hours as needed for Moderate Pain.  Dose:  600 mg     labetalol 100 MG Tabs  Commonly known as:  NORMODYNE   Take 1 Tab by mouth every 8 hours as needed (SBP >165 or DBP >100).  Dose:  100 mg     loperamide 2 MG Caps  Commonly known as:  IMODIUM   Take 1 Cap by mouth 4 times a day as needed for Diarrhea.  Dose:  2 mg     NS SOLN 100 mL with meropenem 1 GM SOLR 2  g   2 g by Intravenous route every 8 hours for 25 days.  Dose:  2000 mg     NS SOLN 250 mL with vancomycin 10 GM SOLR 600 mg   600 mg by Intravenous route every 24 hours for 18 days.  Dose:  600 mg     ondansetron 4 MG Tbdp  Commonly known as:  ZOFRAN ODT   Take 1 Tab by mouth every four hours as needed (give PO if IV route is unavailable. May give per feeding tube.).  Dose:  4 mg     potassium chloride SA 20 MEQ Tbcr  Commonly known as:  Kdur   Take 2 Tabs by mouth 2 Times a Day.  Dose:  40 mEq     quetiapine 50 MG tablet  Commonly known as:  SEROQUEL   Take 1 Tab by mouth every evening.  Dose:  50 mg        CONTINUE taking these medications      Instructions   acetaminophen 325 MG Tabs  Commonly known as:  TYLENOL   Take 2 Tabs by mouth 3 times a day.  Dose:  650 mg     ferrous sulfate 325 (65 Fe) MG tablet   Take 1 Tab by mouth every morning with breakfast.  Dose:  325 mg     gabapentin 300 MG Caps  Commonly known as:  NEURONTIN   Take 300 mg by mouth 2 Times a Day.  Dose:  300 mg     losartan 50 MG Tabs  Commonly known as:  COZAAR   Take 50 mg by mouth every day.  Dose:  50 mg     metFORMIN 500 MG Tabs  Commonly known as:  GLUCOPHAGE   Take 500 mg by mouth 2 times a day, with meals.  Dose:  500 mg     nystatin powder  Commonly known as:  MYCOSTATIN   Apply to the groin     omeprazole 20 MG delayed-release capsule  Commonly known as:  PRILOSEC   Take 20 mg by mouth every day.  Dose:  20 mg     polyethylene glycol/lytes Pack  Commonly known as:  MIRALAX   Take 1 Packet by mouth every day.  Dose:  17 g     pramipexole 0.25 MG Tabs  Commonly known as:  MIRAPEX   Take 0.25 mg by mouth as needed.  Dose:  0.25 mg     pravastatin 20 MG Tabs  Commonly known as:  PRAVACHOL   Take 40 mg by mouth every evening.  Dose:  40 mg     rivaroxaban 15 MG Tabs tablet  Commonly known as:  XARELTO   Take 15 mg by mouth every day.  Dose:  15 mg        STOP taking these medications    furosemide 40 MG Tabs  Commonly known as:  LASIX      sodium chloride 1 GM Tabs  Commonly known as:  SALT     tizanidine 4 MG Tabs  Commonly known as:  ZANAFLEX            Allergies  No Known Allergies    DIET  Orders Placed This Encounter   Procedures   • Diet Order Diabetic     Standing Status:   Standing     Number of Occurrences:   1     Order Specific Question:   Diet:     Answer:   Diabetic [3]     Order Specific Question:   Texture/Fiber modifications:     Answer:   Dysphagia 1(Pureed)specify fluid consistency(question 6) [1]     Order Specific Question:   Consistency/Fluid modifications:     Answer:   Nectar Thick [2]     Order Specific Question:   Miscellaneous modifications:     Answer:   SLP - 1:1 Supervision by Nursing [21]     Comments:   no straws     Order Specific Question:   Miscellaneous modifications:     Answer:   SLP - Deliver to Nursing Station [22]       ACTIVITY  As tolerated.  Weight bearing as tolerated    CONSULTATIONS  ID  Neurosurgery  orthopedic surgery     PROCEDURES  Right gluteal abscess collection 3/29      LABORATORY  Lab Results   Component Value Date    SODIUM 146 (H) 04/30/2019    POTASSIUM 3.8 04/30/2019    CHLORIDE 112 04/30/2019    CO2 26 04/30/2019    GLUCOSE 78 04/30/2019    BUN 16 04/30/2019    CREATININE 0.45 (L) 04/30/2019        Lab Results   Component Value Date    WBC 3.1 (L) 04/30/2019    HEMOGLOBIN 10.2 (L) 04/30/2019    HEMATOCRIT 33.5 (L) 04/30/2019    PLATELETCT 104 (L) 04/30/2019        Total time of the discharge process exceeds 40 minutes.

## 2019-04-30 NOTE — THERAPY
"Pt continues to demonstrate posterior lean upon intial standing, requiring postrual facilitation. Pt demonstrated smoother gait mechnaics w/improved wt shifting and foot clearance w/less physical assist. Pt required modA for walker management and sequencing. Pt w/decreased ROM of B anklles, impaiirng gait mechanics and strength. Pt would benefit from further acute PT txs to progress towards goals and independence. Pt appears to have reached a plateau in he rfuncitonal mobility. Will follow for 1x per week, w/recommend ation of post acute placement/LTAC. Will discuss w/primary PT.    Physical Therapy Treatment completed.   Bed Mobility:  Supine to Sit: Moderate Assist  Transfers: Sit to Stand: Minimal Assist  Gait: Level Of Assist: Moderate Assist with Front-Wheel Walker       Plan of Care: Will benefit from Physical Therapy 4 times per week    See \"Rehab Therapy-Acute\" Patient Summary Report for complete documentation.       "

## 2019-04-30 NOTE — THERAPY
Attempted to see pt for Occupational Therapy treatment today. RN informed OT that pt is in the process of discharging to [facility], transport in route. OT treatment withheld. RN informed and agreed.

## 2019-04-30 NOTE — DISCHARGE INSTRUCTIONS
Discharge Instructions    Discharged to other by medical transportation with escort. Discharged via wheelchair, hospital escort: Yes.  Special equipment needed: Not Applicable    Be sure to schedule a follow-up appointment with your primary care doctor or any specialists as instructed.     Discharge Plan:   Pneumococcal Vaccine Administered/Refused: Not given - Patient refused pneumococcal vaccine  Influenza Vaccine Indication: Not indicated: Previously immunized this influenza season and > 8 years of age    I understand that a diet low in cholesterol, fat, and sodium is recommended for good health. Unless I have been given specific instructions below for another diet, I accept this instruction as my diet prescription.   Other diet: Regular    Special Instructions: None    · Is patient discharged on Warfarin / Coumadin?   No     Depression / Suicide Risk    As [facility] are [facility], it is important to learn how to keep safe from harming yourself.    Recognize the warning signs:  · Abrupt changes in personality, positive or negative- including increase in energy   · Giving away possessions  · Change in eating patterns- significant weight changes-  positive or negative  · Change in sleeping patterns- unable to sleep or sleeping all the time   · Unwillingness or inability to communicate  · Depression  · Unusual sadness, discouragement and loneliness  · Talk of wanting to die  · Neglect of personal appearance   · Rebelliousness- reckless behavior  · Withdrawal from people/activities they love  · Confusion- inability to concentrate     If you or a loved one observes any of these behaviors or has concerns about self-harm, here's what you can do:  · Talk about it- your feelings and reasons for harming yourself  · Remove any means that you might use to hurt yourself (examples: pills, rope, extension cords, firearm)  · Get professional help from the community (Mental Health, Substance Abuse,  psychological counseling)  · Do not be alone:Call your Safe Contact- someone whom you trust who will be there for you.  · Call your local CRISIS HOTLINE 682-0927 or [phone]    Infection Control in the Home  If you have an infection or you are taking care of someone who has an infection, it is important to know how to keep the infection from spreading. Follow these guidelines to help stop the spread of infection, and talk to your health care provider.  HOW ARE INFECTIONS SPREAD?  In order for an infection to spread, the following must be present:  · A germ. This may be a virus, bacteria, fungus, or parasite.  · A place for the germ to live. This may be:  ¨ On or in a person, animal, plant, or food.  ¨ In soil or water.  ¨ On surfaces, such as a door handle.  · A susceptible host. This is a person or animal who does not have resistance (immunity) to the germ.  · A way for the germ to enter the host. This may occur by:  ¨ Direct contact. This may happen by making contact--such as shaking hands or hugging--with an infected person or animal. Some germs can also travel through the air and spread to you if an infected person coughs or sneezes on you or near you.  ¨ Indirect contact. This is when the germ enters the host through contact with an infected object. Examples include eating contaminated food, drinking contaminated water, or touching a contaminated surface with your hands and then touching your face, nose, or mouth soon after that.  HOW CAN I HELP TO PREVENT INFECTION FROM SPREADING?  There are several things that you can do to help prevent infection from spreading.  Hand Washing  It is very important to wash your hands correctly, following these steps:  2. Wet your hands with clean, running water.  3. Apply soap to your hands. Liquid soap is better than bar soap.  4. Rub your hands together quickly to create lather.  5. Keep rubbing your hands together for at least 20 seconds. Thoroughly scrub all parts of  your hands, including under your fingernails and between your fingers.  6. Rinse your hands with clean, running water until all of the soap is gone.  7. Dry your hands with an air dryer or a clean paper or cloth towel, or let your hands air-dry. Do not use your clothing or a soiled towel to dry your hands.  8. If you are in a public restroom, use your towel to turn off the water faucet and to open the bathroom door.  Make sure to wash your hands:  · Before:  ¨ Visiting a baby or anyone with a weakened or lowered defense (immune) system.  ¨ Putting in and taking out any contact lenses.  · After:  ¨ Working or playing outside.  ¨ Touching an animal or its toys or leash.  ¨ Handling livestock.  ¨ Using the bathroom or helping a child or adult to use the bathroom.  ¨ Using household  or toxic chemicals.  ¨ Touching or taking out the garbage.  ¨ Touching anything dirty around your home.  ¨ Handling soiled clothes or rags.  ¨ Taking care of a sick child. This includes touching used tissues, toys, and clothes.  ¨ Sneezing, coughing, or blowing your nose.  ¨ Using public transportation.  ¨ Shaking hands.  ¨ Using a phone, including your mobile phone.  ¨ Touching money.  · Before and after:  ¨ Preparing food.  ¨ Preparing a bottle for a baby.  ¨ Feeding a baby or a young child.  ¨ Eating.  ¨ Visiting or taking care of someone who is sick.  ¨ Changing a diaper.  ¨ Changing a bandage (dressing) or taking care of an injury or wound.  ¨ Giving or taking medicine.  Taking Care of Your Home  · Make sure that you have enough cleaning supplies at all times. These include:  ¨ Disinfectants.  ¨ Reusable cleaning cloths. Wash these after each use.  ¨ Paper towels.  ¨ Utility gloves. Replace your gloves if they are cracked or torn or if they start to peel.  · Use bleach safely. Never mix it with other cleaning products, especially those that contain ammonia. This mixture can create a dangerous gas that may be deadly.  · Take  care of your cleaning supplies. Toilet brushes, mops, and sponges can breed germs. Soak them in bleach and water for 5 minutes after each use.  · Do not pour used mop water down the sink. Pour it down the toilet instead.  · Maintain proper ventilation in your home.  · If you have a pet, ensure that your pet stays clean. Do not let people with weak immune systems touch bird droppings, fish tank water, or a litter box.  ¨ If you have a cat, be sure to change the litter every day.  · In the bathroom, make sure you:  ¨ Provide liquid soap.  ¨ Change towels and washcloths frequently. Avoid sharing towels and washcloths.  ¨ Change toothbrushes often and store them separately in a clean, dry place.  ¨ Disinfect the toilet.  ¨ Clean the tub, shower, and sink with standard cleaning products.    ¨ Mop the floor with a standard .  ¨ Do not share personal items, such as razors, toothbrushes, drinking glasses, deodorant, lyons, brushes, towels, and washcloths.    · In the kitchen, make sure you:  ¨ Store food carefully.  ¨ Refrigerate leftovers promptly in covered containers.  ¨ Throw out stale or spoiled food.  ¨ Clean the inside of your refrigerator each week.  ¨ Keep your refrigerator set at 40°F (4°C) or less, and set your freezer at 0°F (-18°C) or less.  ¨ Thaw foods in the refrigerator or microwave, not at room temperature.  ¨ Serve foods at the proper temperature. Do not eat raw meat. Make sure it is cooked to the appropriate temperature. Cook eggs until they are firm.  ¨ Wash fruits and vegetables under running water.  ¨ Use separate cutting boards, plates, and utensils for raw foods and cooked foods.  ¨ Keep work surfaces clean.  ¨ Use a clean spoon each time you sample food while cooking.  ¨ Wash your dishes in hot, soapy water. Air-dry your dishes or use a .  ¨ Do not share forks, cups, or spoons during meals.  · Wear gloves if laundry is visibly soiled.  · Change linens each week or whenever they are  soiled.  · Do not shake soiled linens. Doing that may send germs into the air. Put dressings, sanitary or incontinence pads, diapers, and gloves in plastic garbage bags for disposal.     This information is not intended to replace advice given to you by your health care provider. Make sure you discuss any questions you have with your health care provider.     Document Released: 09/26/2009 Document Revised: 01/08/2016 Document Reviewed: 08/20/2015  [employer] Interactive Patient Education ©2016 [employer]    · Call your local Children's Mobile Crisis Response Team [place] (291) 238-5271 or www.[employer]  · Call the toll free National Suicide Prevention Hotlines   · National Suicide Prevention Lifeline 613-386-XNMZ (6937)  · [employer] 800-SUICIDE (186-7080)        Metastatic Brain Tumor  A metastatic brain tumor is a growth in your brain. It is made of cancer cells from another part of your body that traveled to your brain through your bloodstream, along the nerves of your brain (cranial nerves), or through the opening at the base of your skull.   CAUSES   The most common cause of a metastatic brain tumor in men is lung cancer. In women, it is breast cancer. Other common causes include:  · Unknown types of cancers.  · Skin cancer (melanoma).  · Colon cancer.  · Kidney cancer.  SIGNS AND SYMPTOMS   Most signs and symptoms of metastatic brain tumors are caused by increased pressure inside your brain. A headache is often the first symptom. Eventually, almost everyone with a metastatic brain tumor has a headache. Other signs and symptoms include:  · Vomiting.  · Weakness or fatigue.  · Seizures.  · Sensory problems, like tingling or numbness.  · Problems walking.  · Clumsiness.  · Emotional changes.  · Personality changes.  · Changes in speech or vision.  DIAGNOSIS   Your health care provider can diagnose a metastatic brain tumor from your medical history and physical exam. You may also have  some additional tests, including:  · A nervous system function test (neurologic exam).  · Imaging studies of your brain, such as an MRI and CT scan.  · Having a small piece of tissue removed (biopsy) from your brain to be checked under a microscope.  TREATMENT   Treatment depends on the type of cancer you have and your overall health. It also depends on the size of your tumor and the number of brain tumors you have. The most common treatments include:  · Medicines to control pain, nausea, and seizures.  · Cancer-killing drugs (chemotherapy).  · An X-ray treatment called radiation therapy to kill brain tumor cells.  · A type of radiation therapy that is targeted to exact locations in the brain (stereotactic radiotherapy).  · Surgery to remove brain tumors.  HOME CARE INSTRUCTIONS  · Only take medicines as directed by your health care provider.  · Do not drive if:  ¨ You are taking strong pain medicines.  ¨ Your vision or thinking is not clear.  · Take two 10-minute walks every day if you are able. Exercising is a good way to relieve stress. It can also help you sleep.  · Be sure to get enough calories and protein in your diet.  ¨ If you have a poor appetite or feel nauseous, eat small meals often.  ¨ Supplement your diet with protein shakes or milk shakes.  · It is common to have anxiety and depression when you are coping with cancer.  ¨ Talk to friends and loved ones about your feelings.  ¨ Have a good support system at home.  SEEK MEDICAL CARE IF:  · You have chills or fever.  · Your medicine is not controlling your symptoms.  · Your symptoms get worse or you develop new symptoms.  · You are having trouble caring for yourself or being cared for at home.  · You are struggling with anxiety or depression.  SEEK IMMEDIATE MEDICAL CARE IF:  · You cannot stop vomiting.  · You cannot keep down any foods or fluids.  · You have sudden changes in speech or vision.  · You have a seizure.  · You can no longer take care of  yourself at home.  This information is not intended to replace advice given to you by your health care provider. Make sure you discuss any questions you have with your health care provider.  Document Released: 09/13/2005 Document Revised: 01/08/2016 Document Reviewed: 12/09/2014  Elsevier Interactive Patient Education © 2017 Elsevier Inc.

## 2019-04-30 NOTE — PROGRESS NOTES
Assumed care of patient. Patient A/O x 2, disoriented to time and event. Patient complains of  4/10 pain in her left leg - medication administered - see MAR. Patient denies nausea. Discussed POC for night with patient at bedside. Bed in lowest, locked position. Call light within reach. Bed alarm on. Hourly rounding in place.     /75   Pulse 85   Temp 36.8 °C (98.2 °F) (Temporal)   Resp 20   Ht 1.524 m (5')   Wt 65.3 kg (143 lb 15.4 oz)   SpO2 96%   Breastfeeding? No   BMI 28.12 kg/m²

## 2019-04-30 NOTE — CARE PLAN
Problem: Safety  Goal: Will remain free from injury  Outcome: PROGRESSING AS EXPECTED  Bed locked and in lowest position, non skid socks in place, bed alarm on, call light in reach    Problem: Discharge Barriers/Planning  Goal: Patient's continuum of care needs will be met  Outcome: PROGRESSING AS EXPECTED  D/c to [facility] today at 1400

## 2019-04-30 NOTE — DISCHARGE PLANNING
Received Transport Form @ 1046 from Rufina CARLOS  Spoke to Juan C @ [place]    Transport is scheduled for 4/30 @1400 going to [facility]  @ [address], [place] [address].    Rufina CARLOS and Cristel ([facility]) notified of transport time.

## 2019-04-30 NOTE — CARE PLAN
Problem: Safety  Goal: Will remain free from injury  Outcome: PROGRESSING AS EXPECTED  Bed in lowest position. Call light within reach. Patient verbalized/demonstrated how to reach staff when needed. Pt belongings within reach. Bed alarm on. Night light provided. Patient's room close to nurses station.     Problem: Skin Integrity  Goal: Risk for impaired skin integrity will decrease  Outcome: PROGRESSING AS EXPECTED  Skin assessment completed. Pillows in use for support and repositioning. Q 2hr turning in place.

## 2019-05-01 LAB
ALBUMIN SERPL BCP-MCNC: 2.4 G/DL (ref 3.2–4.9)
ALBUMIN/GLOB SERPL: 0.5 G/DL
ALP SERPL-CCNC: 322 U/L (ref 30–99)
ALT SERPL-CCNC: 53 U/L (ref 2–50)
ANION GAP SERPL CALC-SCNC: 8 MMOL/L (ref 0–11.9)
AST SERPL-CCNC: 110 U/L (ref 12–45)
BILIRUB SERPL-MCNC: 0.6 MG/DL (ref 0.1–1.5)
BUN SERPL-MCNC: 10 MG/DL (ref 8–22)
CALCIUM SERPL-MCNC: 8.4 MG/DL (ref 8.4–10.2)
CHLORIDE SERPL-SCNC: 105 MMOL/L (ref 96–112)
CO2 SERPL-SCNC: 25 MMOL/L (ref 20–33)
CREAT SERPL-MCNC: 0.54 MG/DL (ref 0.5–1.4)
ERYTHROCYTE [DISTWIDTH] IN BLOOD BY AUTOMATED COUNT: 61.8 FL (ref 35.9–50)
GLOBULIN SER CALC-MCNC: 4.5 G/DL (ref 1.9–3.5)
GLUCOSE SERPL-MCNC: 66 MG/DL (ref 65–99)
HCT VFR BLD AUTO: 34.9 % (ref 37–47)
HGB BLD-MCNC: 10.7 G/DL (ref 12–16)
MCH RBC QN AUTO: 25.5 PG (ref 27–33)
MCHC RBC AUTO-ENTMCNC: 30.7 G/DL (ref 33.6–35)
MCV RBC AUTO: 83.1 FL (ref 81.4–97.8)
MRSA DNA SPEC QL NAA+PROBE: NORMAL
PLATELET # BLD AUTO: 96 K/UL (ref 164–446)
PMV BLD AUTO: 11.2 FL (ref 9–12.9)
POTASSIUM SERPL-SCNC: 3.4 MMOL/L (ref 3.6–5.5)
PROT SERPL-MCNC: 6.9 G/DL (ref 6–8.2)
RBC # BLD AUTO: 4.2 M/UL (ref 4.2–5.4)
SIGNIFICANT IND 70042: NORMAL
SITE SITE: NORMAL
SODIUM SERPL-SCNC: 138 MMOL/L (ref 135–145)
SOURCE SOURCE: NORMAL
WBC # BLD AUTO: 4.1 K/UL (ref 4.8–10.8)

## 2019-05-01 PROCEDURE — 85027 COMPLETE CBC AUTOMATED: CPT

## 2019-05-01 PROCEDURE — 80053 COMPREHEN METABOLIC PANEL: CPT

## 2019-05-03 LAB — VANCOMYCIN TROUGH SERPL-MCNC: 10.8 UG/ML (ref 10–20)

## 2019-05-03 PROCEDURE — 80202 ASSAY OF VANCOMYCIN: CPT

## 2019-05-03 ASSESSMENT — ENCOUNTER SYMPTOMS
COUGH: 0
DIARRHEA: 1
DIZZINESS: 0
PALPITATIONS: 0
NAUSEA: 0
HEADACHES: 0
VOMITING: 0
SHORTNESS OF BREATH: 0
CONSTIPATION: 0
WHEEZING: 0
CHILLS: 0
FEVER: 0
DIAPHORESIS: 0
WEAKNESS: 1
BACK PAIN: 1

## 2019-05-03 NOTE — [facility]
Hospital Medicine Progress Note, Adult, Complex               Author: Odalys Mitchell Date & Time created: 5/3/2019  11:30 AM     CC: brain abscesses    Interval History:  This is a 68 y.o. female here with history of , history of COPD, diabetes, hypertension, depression, anemia right hip pain, fever and abnormal brain mri. lesions on MRI showed improvement with antibiotics so this is continued for a total of 6 weeks.  The patient is up to a chair today but asking to go back to bed as it makes her low back uncomfortable    Review of Systems:  Review of Systems   Constitutional: Negative for chills, diaphoresis and fever.   HENT: Negative for hearing loss.    Respiratory: Negative for cough and shortness of breath.    Cardiovascular: Negative for chest pain and leg swelling.   Gastrointestinal: Positive for diarrhea. Negative for constipation, nausea and vomiting.   Genitourinary: Negative for dysuria, frequency and hematuria.   Musculoskeletal: Positive for back pain.        Low back pain sitting in chair   Skin: Negative for itching and rash.   Neurological: Positive for weakness. Negative for dizziness and headaches.       Physical Exam:  Physical Exam   Constitutional: No distress.   HENT:   Mouth/Throat: Oropharynx is clear and moist.   Eyes: Pupils are equal, round, and reactive to light. Conjunctivae are normal.   Neck: Neck supple. No tracheal deviation present.   Cardiovascular: Normal rate and regular rhythm.  PMI is displaced.    Pulmonary/Chest: Effort normal and breath sounds normal. No respiratory distress. She has no wheezes.   Abdominal: Soft. She exhibits no distension. There is no tenderness.   Musculoskeletal: She exhibits no edema.   Neurological: She is alert.   Skin: Skin is warm. No rash noted. She is not diaphoretic. No erythema.   Vitals reviewed.      Labs:          Recent Labs      05/01/19   0100   SODIUM  138   POTASSIUM  3.4*   CHLORIDE  105   CO2  25   BUN  10   CREATININE  0.54    CALCIUM  8.4     Recent Labs      05/01/19 0100   ALTSGPT  53*   ASTSGOT  110*   ALKPHOSPHAT  322*   TBILIRUBIN  0.6   GLUCOSE  66     Recent Labs      05/01/19   0100   RBC  4.20   HEMOGLOBIN  10.7*   HEMATOCRIT  34.9*   PLATELETCT  96*     Recent Labs      05/01/19 0100   WBC  4.1*   ASTSGOT  110*   ALTSGPT  53*   ALKPHOSPHAT  322*   TBILIRUBIN  0.6           Hemodynamics:   T98.1 /74 HR96 RR20 O2 sat 94%     GI/Nutrition:  Orders Placed This Encounter   Procedures   • Diet Order Diabetic     Standing Status:   Standing     Number of Occurrences:   1     Order Specific Question:   Diet:     Answer:   Diabetic [3]     Order Specific Question:   Texture/Fiber modifications:     Answer:   Dysphagia 3(Mechanical Soft)specify fluid consistency(question 6) [3]     Order Specific Question:   Consistency/Fluid modifications:     Answer:   Thin Liquids [3]     Medical Decision Making, by Problem:  Brain abscesses on MRI with improvement on antibiotics   Repeat MRI 5/24   Antibiotics per ID   No endocarditis on CHAS   No abscess on MRI of spine    Pelvic abscess resolved on repeat imaging, drain removed    breast cancer bilateral mastectomy status post chemotherapy, treated over 20 years ago per patient    Slurred speech due to brain lesions,    Speech therapy  Dysphagia, dysphagia I diet with nectar thick liquids    Restless leg syndrome, mirapex  Dyslipidemia, pravachol  Essential hypertension, norvasc  Debility, patient resides at a SNF     DNR      Quality-Core Measures   Reviewed items::  Labs reviewed and Medications reviewed  Sue catheter::  No Sue  DVT prophylaxis pharmacological::  Enoxaparin (Lovenox)  Ulcer Prophylaxis::  Yes  Antibiotics:  Treating active infection/contamination beyond 24 hours perioperative coverage  Assessed for rehabilitation services:  Patient was assess for and/or received rehabilitation services during this hospitalization

## 2019-05-03 NOTE — [facility]
Hospital Medicine Progress Note, Adult, Complex               Author: Odalys Mitchell Date & Time created: 5/3/2019  2:45 PM     CC: brain abscesses    Interval History:  This is a 68 y.o. female here with history of , history of COPD, diabetes, hypertension, depression, anemia right hip pain, fever and abnormal brain mri. lesions on MRI showed improvement with antibiotics so this is continued for a total of 6 weeks.  Today she denies any headache or vision changes    Review of Systems:  Review of Systems   Constitutional: Negative for diaphoresis and fever.   HENT: Negative for hearing loss.    Respiratory: Negative for shortness of breath and wheezing.    Cardiovascular: Negative for chest pain, palpitations and leg swelling.   Gastrointestinal: Negative for constipation, nausea and vomiting.   Genitourinary: Negative for dysuria, hematuria and urgency.   Skin: Negative for itching.   Neurological: Positive for weakness. Negative for headaches.       Physical Exam:  Physical Exam   Constitutional: No distress.   HENT:   Mouth/Throat: Oropharynx is clear and moist. No oropharyngeal exudate.   Eyes: Pupils are equal, round, and reactive to light. Conjunctivae and EOM are normal.   Neck: No tracheal deviation present.   Cardiovascular: Normal rate and regular rhythm.  PMI is displaced.    Pulmonary/Chest: Effort normal and breath sounds normal. No stridor. No respiratory distress. She has no wheezes.   Abdominal: Soft. There is no tenderness.   Musculoskeletal: She exhibits no edema.   Neurological: She is alert. Coordination normal.   Skin: Skin is warm and dry. No rash noted. She is not diaphoretic.   Vitals reviewed.      Labs:          Recent Labs      05/01/19   0100   SODIUM  138   POTASSIUM  3.4*   CHLORIDE  105   CO2  25   BUN  10   CREATININE  0.54   CALCIUM  8.4     Recent Labs      05/01/19   0100   ALTSGPT  53*   ASTSGOT  110*   ALKPHOSPHAT  322*   TBILIRUBIN  0.6   GLUCOSE  66     Recent Labs       05/01/19   0100   RBC  4.20   HEMOGLOBIN  10.7*   HEMATOCRIT  34.9*   PLATELETCT  96*     Recent Labs      05/01/19   0100   WBC  4.1*   ASTSGOT  110*   ALTSGPT  53*   ALKPHOSPHAT  322*   TBILIRUBIN  0.6           Hemodynamics:   T97.7 /68  RR18 O2 sat 93%     GI/Nutrition:  Orders Placed This Encounter   Procedures   • Diet Order Diabetic     Standing Status:   Standing     Number of Occurrences:   1     Order Specific Question:   Diet:     Answer:   Diabetic [3]     Order Specific Question:   Texture/Fiber modifications:     Answer:   Dysphagia 3(Mechanical Soft)specify fluid consistency(question 6) [3]     Order Specific Question:   Consistency/Fluid modifications:     Answer:   Thin Liquids [3]     Medical Decision Making, by Problem:  Brain abscesses on MRI with improvement on antibiotics   Repeat MRI 5/24 to evaluate brain abscesses   Antibiotics per ID   No endocarditis on CHAS   No abscess on MRI of lumbar spine    Pelvic abscess resolved on repeat imaging, drain removed    breast cancer bilateral mastectomy status post chemotherapy, treated over 20 years ago per patient    Slurred speech due to brain lesions,    Speech therapy  Dysphagia, dysphagia I diet with nectar thick liquids    Restless leg syndrome, mirapex  Dyslipidemia, pravachol  Essential hypertension, norvasc  Debility, patient resides at a SNF, PT/OT as tolerated    DNR      Quality-Core Measures   Reviewed items::  Labs reviewed and Medications reviewed  Sue catheter::  No Sue  DVT prophylaxis pharmacological::  Enoxaparin (Lovenox)  Ulcer Prophylaxis::  Yes  Antibiotics:  Treating active infection/contamination beyond 24 hours perioperative coverage  Assessed for rehabilitation services:  Patient was assess for and/or received rehabilitation services during this hospitalization

## 2019-05-04 LAB
ANION GAP SERPL CALC-SCNC: 9 MMOL/L (ref 0–11.9)
BUN SERPL-MCNC: 11 MG/DL (ref 8–22)
CALCIUM SERPL-MCNC: 8.7 MG/DL (ref 8.4–10.2)
CHLORIDE SERPL-SCNC: 99 MMOL/L (ref 96–112)
CO2 SERPL-SCNC: 25 MMOL/L (ref 20–33)
CREAT SERPL-MCNC: 0.54 MG/DL (ref 0.5–1.4)
ERYTHROCYTE [DISTWIDTH] IN BLOOD BY AUTOMATED COUNT: 60.2 FL (ref 35.9–50)
GLUCOSE SERPL-MCNC: 75 MG/DL (ref 65–99)
HCT VFR BLD AUTO: 34.7 % (ref 37–47)
HGB BLD-MCNC: 11 G/DL (ref 12–16)
MCH RBC QN AUTO: 26.1 PG (ref 27–33)
MCHC RBC AUTO-ENTMCNC: 31.7 G/DL (ref 33.6–35)
MCV RBC AUTO: 82.4 FL (ref 81.4–97.8)
PLATELET # BLD AUTO: 93 K/UL (ref 164–446)
PMV BLD AUTO: 10.5 FL (ref 9–12.9)
POTASSIUM SERPL-SCNC: 4 MMOL/L (ref 3.6–5.5)
RBC # BLD AUTO: 4.21 M/UL (ref 4.2–5.4)
SODIUM SERPL-SCNC: 133 MMOL/L (ref 135–145)
WBC # BLD AUTO: 3.8 K/UL (ref 4.8–10.8)

## 2019-05-04 PROCEDURE — 80048 BASIC METABOLIC PNL TOTAL CA: CPT

## 2019-05-04 PROCEDURE — 85027 COMPLETE CBC AUTOMATED: CPT

## 2019-05-04 ASSESSMENT — ENCOUNTER SYMPTOMS
CHILLS: 0
SHORTNESS OF BREATH: 0
FEVER: 0
WEAKNESS: 1
WHEEZING: 0
ABDOMINAL PAIN: 0
NAUSEA: 0
HEARTBURN: 0
HEADACHES: 0
CONSTIPATION: 0

## 2019-05-04 NOTE — [facility]
Hospital Medicine Progress Note, Adult, Complex               Author: Odalys Mitchell Date & Time created: 5/4/2019  10:02 AM     CC: brain abscesses    Interval History:  This is a 68 y.o. female here with history of , history of COPD, diabetes, hypertension, depression, anemia right hip pain, fever and abnormal brain mri. lesions on MRI showed improvement with antibiotics so this is continued for a total of 6 weeks.  The patient has soft stools and passed a large stool yesterday    Review of Systems:  Review of Systems   Constitutional: Negative for chills and fever.   HENT: Negative for hearing loss.    Respiratory: Negative for shortness of breath and wheezing.    Cardiovascular: Negative for chest pain and leg swelling.   Gastrointestinal: Negative for abdominal pain, constipation, heartburn and nausea.   Genitourinary: Negative for dysuria, hematuria and urgency.   Skin: Negative for itching and rash.   Neurological: Positive for weakness. Negative for headaches.       Physical Exam:  Physical Exam   Constitutional: She is oriented to person, place, and time. No distress.   HENT:   Mouth/Throat: Oropharynx is clear and moist. No oropharyngeal exudate.   Eyes: Pupils are equal, round, and reactive to light. No scleral icterus.   Neck: No tracheal deviation present.   Cardiovascular: Normal rate and regular rhythm.  PMI is displaced.    Pulmonary/Chest: Effort normal and breath sounds normal. No stridor. No respiratory distress. She has no wheezes.   Abdominal: Soft. Bowel sounds are normal. There is no tenderness.   Musculoskeletal: She exhibits no edema.   Neurological: She is alert and oriented to person, place, and time.   Skin: Skin is warm. No rash noted. She is not diaphoretic. No erythema.   Vitals reviewed.      Labs:          Recent Labs      05/04/19   0447   SODIUM  133*   POTASSIUM  4.0   CHLORIDE  99   CO2  25   BUN  11   CREATININE  0.54   CALCIUM  8.7     Recent Labs      05/04/19   0447   GLUCOSE   75     Recent Labs      05/04/19   0447   RBC  4.21   HEMOGLOBIN  11.0*   HEMATOCRIT  34.7*   PLATELETCT  93*     Recent Labs      05/04/19   0447   WBC  3.8*           Hemodynamics:   T98.6 /94  RR20 O2 sat 95%     GI/Nutrition:  Orders Placed This Encounter   Procedures   • Diet Order Diabetic     Standing Status:   Standing     Number of Occurrences:   1     Order Specific Question:   Diet:     Answer:   Diabetic [3]     Order Specific Question:   Texture/Fiber modifications:     Answer:   Dysphagia 3(Mechanical Soft)specify fluid consistency(question 6) [3]     Order Specific Question:   Consistency/Fluid modifications:     Answer:   Thin Liquids [3]     Medical Decision Making, by Problem:  Brain abscesses on MRI with improvement on antibiotics   Repeat MRI 5/24 to evaluate brain abscesses   Merrem and vancomycin til 5/25 per ID   No endocarditis on CHAS   No abscess on MRI of lumbar spine    Pelvic abscess resolved on repeat imaging, drain removed    breast cancer bilateral mastectomy status post chemotherapy, treated over 20 years ago per patient    Slurred speech due to brain lesions,    Speech therapy  Dysphagia, dysphagia I diet with nectar thick liquids  History of dvt, xarelto  Thrombocytopenia, follow labs, consider need to change antibiotics      Restless leg syndrome, mirapex  Dyslipidemia, pravachol  Essential hypertension, norvasc  Debility, patient resides at a SNF, PT/OT as tolerated    DNR      Quality-Core Measures   Reviewed items::  Labs reviewed and Medications reviewed  Sue catheter::  No Sue  DVT prophylaxis pharmacological::  Enoxaparin (Lovenox)  Ulcer Prophylaxis::  Yes  Antibiotics:  Treating active infection/contamination beyond 24 hours perioperative coverage  Assessed for rehabilitation services:  Patient was assess for and/or received rehabilitation services during this hospitalization

## 2019-05-05 LAB
ANION GAP SERPL CALC-SCNC: 7 MMOL/L (ref 0–11.9)
BUN SERPL-MCNC: 13 MG/DL (ref 8–22)
CALCIUM SERPL-MCNC: 8.8 MG/DL (ref 8.4–10.2)
CHLORIDE SERPL-SCNC: 99 MMOL/L (ref 96–112)
CO2 SERPL-SCNC: 25 MMOL/L (ref 20–33)
CREAT SERPL-MCNC: 0.5 MG/DL (ref 0.5–1.4)
ERYTHROCYTE [DISTWIDTH] IN BLOOD BY AUTOMATED COUNT: 60.4 FL (ref 35.9–50)
GLUCOSE SERPL-MCNC: 75 MG/DL (ref 65–99)
HCT VFR BLD AUTO: 36.5 % (ref 37–47)
HGB BLD-MCNC: 11.8 G/DL (ref 12–16)
MCH RBC QN AUTO: 26.7 PG (ref 27–33)
MCHC RBC AUTO-ENTMCNC: 32.3 G/DL (ref 33.6–35)
MCV RBC AUTO: 82.6 FL (ref 81.4–97.8)
PLATELET # BLD AUTO: 126 K/UL (ref 164–446)
PMV BLD AUTO: 11.1 FL (ref 9–12.9)
POTASSIUM SERPL-SCNC: 4.4 MMOL/L (ref 3.6–5.5)
RBC # BLD AUTO: 4.42 M/UL (ref 4.2–5.4)
SODIUM SERPL-SCNC: 131 MMOL/L (ref 135–145)
WBC # BLD AUTO: 3.9 K/UL (ref 4.8–10.8)

## 2019-05-05 PROCEDURE — 80048 BASIC METABOLIC PNL TOTAL CA: CPT

## 2019-05-05 PROCEDURE — 85027 COMPLETE CBC AUTOMATED: CPT

## 2019-05-05 ASSESSMENT — ENCOUNTER SYMPTOMS
CHILLS: 0
DOUBLE VISION: 0
CONSTIPATION: 1
NERVOUS/ANXIOUS: 0
COUGH: 0
HEARTBURN: 0
MYALGIAS: 1
CONSTIPATION: 0
FEVER: 0
SHORTNESS OF BREATH: 0
ABDOMINAL PAIN: 0
WEAKNESS: 1
BLURRED VISION: 0
NAUSEA: 0
PALPITATIONS: 0
DIZZINESS: 1
DIARRHEA: 0
SPUTUM PRODUCTION: 0
HEADACHES: 0
VOMITING: 0

## 2019-05-05 NOTE — CONSULTS
Consults   INFECTIOUS DISEASES INPATIENT CONSULT NOTE     Date of Service: 5/5/2019    Consult Requested By: Odalys Mitchell M.D.    Reason for Consultation: Brain abscess, iliopsoas abscess, thrombocytopenia    History of Present Illness:   Muriel Martini is a 68 y.o.  admitted 4/30/2019. 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    Work-up revealed a right iliopsoas lesion and given her history of breast   cancer, there was concern for metastatic malignancy.  She transferred to a skilled nursing facility but there developed fever up to 104 and encephalopathy.  She had a positive urine culture and she was empirically treated for UTI.    Due to new onset confusion she had an MRI of the brain which showed multiple regions and she was again transferred to [facility] for higher level of care.     She was worked up at [facility] and seen by ID.  Per ID notes the differential for the multiple brain lesions was abscess versus metastatic.  Lesions were too small to biopsy per neurosurgery.  TTE and CHAS negative.  Blood cultures negative.  Imaging on 3/27 showed iliopsoas and gluteal fluid collections and drain was placed on 3/29.  Cultures were all negative.  Another drain was placed on 4/4 with purulent fluid recovered.  The abscesses appear to have resolved on CT 4/8.  However repeat CT on 4/19 revealed right gluteal muscle abscess in addition to recurrent abscess in the right iliac muscle.  The abscesses were too small for drainage.  All cultures have been negative, however she had been on antibiotics.  She was initially on vancomycin cefepime and Flagyl.  The cefepime was changed to meropenem per notes on 3/26 due to concern it was causing confusion.  However per subsequent notes she continued to encephalopathy/delirium with a  have waxing and waning pattern.  CT on 4/23 with unchanged fluid collection in the right gluteal muscle and somewhat increased fluid collection in the right iliac is muscle.  MRI on 4/23 with improvement of the most prominent multifocal brain abscesses.  However there are new enhancing lesions in the left basal ganglia and right cerebellum consistent with new abscess.  ID signed off on 4/27 with plan to continue vancomycin and meropenem through 5/20 and then repeat MRI.    She is subsequently transferred to [facility] to complete her IV antibiotics.  She had been doing well at [facility] until recently when she did began to develop increased thrombocytopenia.  Primary team is concerned that this is due to meropenem and agree consulted ID for antibiotic management.      Today she is complaining of bilateral hip pain.  She is alert and oriented x3.  She is able to give a history.  She reports some ongoing constipation and occasional mild dizziness.       Review Of Systems:  Review of Systems   Constitutional: Negative for chills, fever and malaise/fatigue.   HENT: Negative for hearing loss.    Eyes: Negative for blurred vision and double vision.   Respiratory: Negative for cough, sputum production and shortness of breath.    Cardiovascular: Negative for chest pain.   Gastrointestinal: Positive for constipation. Negative for abdominal pain, diarrhea, nausea and vomiting.   Genitourinary: Negative for dysuria.   Musculoskeletal: Positive for joint pain and myalgias.   Skin: Negative for rash.   Neurological: Positive for dizziness and weakness. Negative for headaches.   Psychiatric/Behavioral: The patient is not nervous/anxious.          PMH:   Past Medical History:   Diagnosis Date   • Cancer (HCC)    • COPD (chronic obstructive pulmonary disease) (HCC)    • Diabetes (HCC)     [facility]   • GERD (gastroesophageal reflux disease)     Record received from [facility] 12/14/18   • Hyperlipidemia      Record received from [facility] 12/14/18   • Hypertension     Record received from [facility] 12/14/18       PSH:  Past Surgical History:   Procedure Laterality Date   • SI SCREW Right 12/17/2018    Procedure: SI SCREW;  Surgeon: Cody Porter M.D.;  Location: SURGERY [facility];  Service: Orthopedics   • OTHER      Bilateral Masectomy        FAMILY HX:  No family history on file.    SOCIAL HX:  Social History     Social History   • Marital status:      Spouse name: N/A   • Number of children: N/A   • Years of education: N/A     Occupational History   • Not on file.     Social History Main Topics   • Smoking status: Never Smoker   • Smokeless tobacco: Never Used   • Alcohol use Yes      Comment: socially    • Drug use: No   • Sexual activity: Not on file     Other Topics Concern   • Ok To Release Patient Information To The Following Yes     Arsh - Brother      Social History Narrative   • No narrative on file     History   Smoking Status   • Never Smoker   Smokeless Tobacco   • Never Used     History   Alcohol Use   • Yes     Comment: socially        Allergies/Intolerances:  No Known Allergies    History reviewed with the patient and per chart     Other Current Medications:  No current facility-administered medications for this encounter.   [unfilled]    Most Recent Vital Signs:  There were no vitals taken for this visit.  No Data Recorded    Physical Exam:  Physical Exam   Constitutional: She is oriented to person, place, and time and well-developed, well-nourished, and in no distress.   HENT:   Head: Normocephalic and atraumatic.   Eyes: Pupils are equal, round, and reactive to light. Conjunctivae and EOM are normal.   Cardiovascular: Normal rate, regular rhythm and normal heart sounds.    Pulmonary/Chest: Effort normal.   Crackles right greater than left   Abdominal: Soft. Bowel sounds are normal. She exhibits no distension. There is no tenderness. There is no rebound and no  guarding.   Musculoskeletal: Normal range of motion. She exhibits no edema.   Right hip with no edema, no erythema, no tenderness to palpation or drainage.  Left hip with no edema, no erythema, no tenderness to palpation.   Neurological: She is alert and oriented to person, place, and time.   Skin: Skin is warm and dry.   Psychiatric: Affect normal.         Pertinent Lab Results:  Recent Labs      05/04/19 0447 05/05/19   0330   WBC  3.8*  3.9*      Recent Labs      05/04/19 0447 05/05/19   0330   HEMOGLOBIN  11.0*  11.8*   HEMATOCRIT  34.7*  36.5*   MCV  82.4  82.6   MCH  26.1*  26.7*   PLATELETCT  93*  126*         Recent Labs      05/04/19 0447 05/05/19 0330   SODIUM  133*  131*   POTASSIUM  4.0  4.4   CHLORIDE  99  99   CO2  25  25   CREATININE  0.54  0.50        No results for input(s): ALBUMIN in the last 72 hours.    Invalid input(s): AST, ALT, ALKPHOS, BILITOT, TOTALBILIRUB, BILIRUBINTOT, BILIRUBINDIR, BILIRUBININD, ALKALINEPHOS     Pertinent Micro:  Results     Procedure Component Value Units Date/Time    MRSA BY PCR (AMP) [[id number]] Collected:  04/30/19 1545    Order Status:  Completed Specimen:  Respirate Updated:  05/01/19 1857     Significant Indicator NEG     Source RESP     Site -     MRSA PCR Negative for MRSA by PCR.    MRSA BY PCR (AMP) [[id number]]     Order Status:  No result Specimen:  Respirate from Nares         Blood Culture   Date Value Ref Range Status   04/18/2019 No growth after 5 days of incubation.  Final   04/18/2019 No growth after 5 days of incubation.  Final        Studies:  Ct-abdomen-pelvis With    Result Date: 4/19/2019 4/18/2019 7:00 PM HISTORY/REASON FOR EXAM: Post-op infection, abdomen pelvis, known TECHNIQUE/EXAM DESCRIPTION:  CT abdomen and pelvis with contrast. Sequential axial CT images were obtained from lung bases to the proximal femurs after the uneventful administration of 100 cc Omnipaque 350 intravenous contrast. Low dose optimization technique was  utilized for this CT exam including automated exposure control and adjustment of the mA and/or kV according to patient size. COMPARISON:  April 8, 2019 CT ABDOMEN FINDINGS: Linear densities are seen within the lung bases, appearance favors atelectasis. Trace pericardial effusion is noted. The liver is normal in contour. The liver is normal in size. No intrahepatic biliary ductal dilatation is noted. The gallbladder appears within normal limits. The spleen is unremarkable. The pancreas is grossly normal. Bilateral adrenal glands appear within normal limits. The kidneys are unremarkable.  The ureters are normal in caliber along their visualized course. The colon appears within normal limits. There is fluid filled distention of the small bowel is seen. Levoscoliosis of the lumbar spine is seen. Atherosclerotic calcifications are seen. CT PELVIS FINDINGS: The bladder is within normal limits. Postsurgical changes of hysterectomy are noted. Enhancing fluid collection is seen in the right gluteal musculature measuring 2.0 cm. Multiloculated appearing density fluid collection along the right iliacus muscle is  seen measuring approximately 3.4 x 2.0 cm. Enlarged bilateral inguinal lymph nodes are noted. Postsurgical changes of bilateral SI joints and sacral screw fixation are noted. Bony remodeling of right iliac and sacral fractures is observed.     1.  Enhancing fluid collection in the right gluteal muscle, appearance compatible with small abscess. 2.  Loculated enhancing fluid collection in the right iliac is muscle, corresponding with site of prior drain, appearance compatible with reaccumulation of abscess. 3.  Fluid-filled distention of small bowel suggests ileus 4.  Atherosclerosis 5.  Trace pericardial effusion    Ct-abdomen-pelvis With    Result Date: 4/8/2019 4/8/2019 4:24 PM HISTORY/REASON FOR EXAM: Abdominal infection, abdominal pain Metastatic breast cancer TECHNIQUE/EXAM DESCRIPTION:  CT scan of the abdomen  and pelvis with contrast. Contrast-enhanced helical scanning was obtained from the diaphragmatic domes through the pubic symphysis following the bolus administration of nonionic contrast without complication. 100 mL of Omnipaque 350 nonionic contrast was administered without complication. Low dose optimization technique was utilized for this CT exam including automated exposure control and adjustment of the mA and/or kV according to patient size. COMPARISON: CT abdomen and pelvis 4/2/2019 FINDINGS: Lung bases: The lung bases are clear. Bilateral breast implants are present. Osseous structures:  Surgical screw traverses both sacroiliac joint. There is fracture of the right ilium. There appears to be destructive change of the right medial sacrum. This is most consistent with known metastatic disease. Abdomen: The liver is enlarged without focal abnormality. There is no biliary dilation. The spleen is normal in appearance. The pancreas is normal in appearance. The splenic vein and portal vein enhance normally. Both adrenal glands are normal in appearance. The right kidney is normal in appearance. The left kidney is normal in appearance. There is no hydronephrosis. Bowel and mesentery: Within normal limits. The aorta shows atherosclerosis without aneurysm. The terminal ileum is normal in appearance. The appendix is not identified. Pelvis: There is a drainage catheter within or just anterior to the right iliacus muscle. There is no residual associated fluid. Right gluteal drainage catheter has been removed.     1.  Resolution of right iliacus abscess with no associated fluid adjacent to the drainage catheter 2.  Interval removal of right gluteal drainage catheter 3.  Surgical screw traversing both sacroiliac joint with associated pathologic fracture of the right ilium and right medial sacrum 4.  Hepatomegaly    Ct-chest,abdomen,pelvis With    Result Date: 4/23/2019 4/23/2019 8:57 PM HISTORY/REASON FOR EXAM: Breast  cancer. Shortness of breath. TECHNIQUE/EXAM DESCRIPTION: CT scan of the chest, abdomen and pelvis with contrast. Thin-section helical scanning was obtained with intravenous contrast from the lung apices through the pubic symphysis to include the chest, abdomen and pelvis. 80 mL of Omnipaque 350 nonionic contrast was administered intravenously without complication. Low dose optimization technique was utilized for this CT exam including automated exposure control and adjustment of the mA and/or kV according to patient size. COMPARISON: None Available FINDINGS: The entire study is degraded by patient respiratory motion artifact. CT CHEST: There is limited evaluation of the lungs due to extensive patient respiratory motion artifact. There is a linear atelectasis with groundglass opacity bilaterally. There are bilateral breast implants. There are bilateral axillary lymph nodes seen which measure up to 13 mm in short axis dimension bilaterally. There are surgical clips within the axilla. There are small bilateral thyroid nodules. There is no evidence of pericardial effusion. There is moderate atherosclerotic change of the aorta or coronary vessels. CT ABDOMEN: There is fatty change of the liver. The spleen is enlarged. There is dense material layering dependently within the gallbladder consistent with gallstones versus vicarious excretion of contrast. The spleen is normal. The kidneys are normal. The adrenal glands are normal. The pancreas is normal. There are retroperitoneal lymph nodes which measure less than 10 mm in short axis dimension. Similar sized periportal and portacaval lymph nodes are seen as well. CT PELVIS: The appendix is not identified. There is moderate constipation. There is no evidence of bowel obstruction or inflammatory change. There is no evidence of free fluid. There screws extending through the sacrum bilaterally. There is surrounding lucency and fragmentation with fracture extending into the right  ilium. There is some fluid seen within the right gluteal and iliac is muscles. The right gluteal fluid collection  measures 18 mm in size and is not significantly changed. The fluid collection within the right iliacis muscle has increased in size now measures 2.7 cm.     1.  Limited evaluation of the thorax due to extensive patient respiratory motion artifact. 2.  Borderline enlarged bilateral axillary lymph nodes. 3.  Linear atelectasis within the lungs with patchy groundglass opacity bilaterally. 4.  Again seen screws extending through the sacrum bilaterally. There is surrounding lucency and fragmentation of the sacrum as well as the right greater than left ilium and a right iliac fracture. It is uncertain if these represent metastatic lesions with pathologic fracture versus insufficiency fractures. 5.  Fluid collections within the right gluteal and iliacis muscles.. The collection within the right gluteal muscle has unchanged while the fluid collection within the right iliacis muscle is increased somewhat in size. Differential diagnosis includes hematoma as well as abscess. 6.  Enlarged retroperitoneal, periportal and portacaval lymph nodes.    Ct-head With & W/o    Result Date: 4/13/2019 4/13/2019 4:12 PM HISTORY/REASON FOR EXAM:  History of septic emboli, follow-up. TECHNIQUE/EXAM DESCRIPTION AND NUMBER OF VIEWS: CT scan of the head without and with contrast. The study was performed on a helical multidetector CT scanner. Contiguous axial sections were obtained from the skull base through the vertex both prior to and after the administration of IV contrast. 100 mL of Omnipaque 350 nonionic contrast was injected intravenously. Up to date radiation dose reduction adjustments have been utilized to meet ALARA standards for radiation dose reduction. COMPARISON:  CT dated 3/30/2019, 3/17/2019, MR dated 3/7/2019 FINDINGS: There are persistent small hyperenhancing lesions scattered throughout the brain. Largest lesion  in the left basal ganglia measures 4.6 mm and demonstrates mild associated edema. Largest lesion in the right cerebral hemisphere measures 4 mm, also with mild associated edema. Several enhancing lesions are also seen in the brainstem. All of the lesions appear stable since prior. No new or enlarging lesions are detected The calvariae and skull base are unremarkable. There are no extraaxial fluid collections. The ventricular system and basal cisterns are within normal limits. There are no hemorrhagic lesions. The paranasal sinuses and mastoids in the field of view are unremarkable. Mild atrophy and chronic small vessel ischemic changes.     1.  No change in scattered hyperenhancing lesions throughout the brain, cerebellum and visualized brainstem. Some of them demonstrate mild associated edema, similar to prior study. 2.  No CT evidence of infarct or hemorrhage.    Dx-chest-limited (1 View)    Result Date: 4/22/2019 4/22/2019 8:40 PM HISTORY/REASON FOR EXAM:  Shortness of Breath TECHNIQUE/EXAM DESCRIPTION AND NUMBER OF VIEWS: Single portable view of the chest. COMPARISON: 4/16/2019 FINDINGS: HEART: Stable size. LUNGS: No areas of air space disease are demonstrated. PLEURA: No effusion or pneumothorax.     No acute cardiac or pulmonary abnormalities are identified. Lung volumes are low.    Dx-chest-portable (1 View)    Result Date: 4/30/2019 4/30/2019 4:19 PM HISTORY/REASON FOR EXAM:  PICC line placement. IV access necessity. TECHNIQUE/EXAM DESCRIPTION AND NUMBER OF VIEWS: Single portable view of the chest. COMPARISON:  4/22/2019 FINDINGS: A right arm PICC line is present in satisfactory position with its tip projecting over the SVC at the level of the jennifer. The cardiac silhouette is within normal limits. No infiltrates or consolidations are noted. No pleural effusion is noted.     1.  Right arm PICC line tip projects in satisfactory position. 2.  Clear chest.    Dx-chest-portable (1 View)    Result Date:  4/16/2019 4/16/2019 5:43 AM HISTORY/REASON FOR EXAM: Shortness of Breath TECHNIQUE/EXAM DESCRIPTION:  Single AP view of the chest. COMPARISON: March 4, 2019 FINDINGS: Cardiomegaly is observed. The mediastinal contour appears within normal limits.  The central  pulmonary vasculature appears prominent and indistinct. Bilateral lung volumes are diminished.  Diffuse scattered hazy pulmonary parenchymal opacities are seen. No significant pleural effusions are identified. The bony structures appear age-appropriate.     1.  Mild pulmonary edema and/or infiltrate 2.  Cardiomegaly    Mr-brain-with & W/o    Result Date: 4/24/2019 4/24/2019 3:42 PM HISTORY/REASON FOR EXAM:  eval change of brain abscesses. TECHNIQUE/EXAM DESCRIPTION:   MRI of the brain without and with contrast. T1 sagittal, T2 fast spin-echo axial, T1 coronal, FLAIR coronal, diffusion-weighted and apparent diffusion coefficient (ADC map) axial images were obtained of the whole brain. T1 postcontrast axial and T1 postcontrast coronal images were obtained. The study was performed on a [employer] Signa 1.5 Miranda MRI scanner. 6 mL Gadavist contrast was administered intravenously. COMPARISON:  3/7/2090 FINDINGS:     The post gadolinium sequences demonstrates multifocal areas of nodular enhancement seen throughout the supra and infratentorial brain parenchyma. Some of the lesions demonstrates surrounding T2 hyperintensity. There is no acute infarct. There is no acute or chronic parenchymal hemorrhage. There is no hydrocephalus. Mild cerebral volume loss is seen. There is no extra-axial fluid collection, hemorrhage or mass. The vertebrobasilar system is diminutive in caliber. There are fetal origins of bilateral posterior cerebral arteries. There is no large lesion identified in the expected course of the intracranial portions of the cranial nerves. The skull bones are unremarkable. The paranasal sinuses are clear. The extracranial soft tissue including orbits appear  grossly normal.     Multiple punctate enhancing lesions seen throughout the supra and infratentorial brain parenchyma. When compared with the previous MRI there has been interval decrease in the size of the lesions. There are also interval reduction in the extent of the surrounding white matter edema in the restricted diffusion consistent with improvement/treatment response of the most of the multifocal brain abscesses. However there are new enhancing lesions in the left basal ganglia and right cerebellum consistent with interval new abscesses.Follow-up is recommended to ensure the complete resolution of the enhancing lesions. Please note that the radiological differential diagnosis of this multifocal lesions still includes metastasis. However decrease in the size of the most of the lesions favors the diagnosis of infection.     Ct-cta Chest Pulmonary Artery W/ Recons    Result Date: 4/16/2019 4/16/2019 6:13 AM HISTORY/REASON FOR EXAM:  Shortness of breath TECHNIQUE/EXAM DESCRIPTION:  CT angiogram of the chest with contrast. Transaxial MDCT scan of chest post bolus 55 cc Omnipaque 350 IV administration. MIP reconstructed images were created and reviewed. Low dose optimization technique was utilized for this CT exam including automated exposure control and adjustment of the mA and/or kV according to patient size. COMPARISON:  March 5, 2019 FINDINGS: The visualized portion of the thyroid appear within normal limits.  The trachea and main stem airways are normal in caliber. There are no pathologically enlarged mediastinal lymph nodes. Trace pericardial effusion is seen, otherwise the heart and pericardium appear within normal limits.  Atherosclerotic calcifications are identified.  The pulmonary arteries are well opacified, no large central pulmonary arterial filling defect is appreciated, respiratory motion artifacts limit evaluation of the subsegmental pulmonary arterial branches. No pulmonary consolidation is  identified. 7.7 mm medial pleural-based pulmonary nodule is seen on image 140. 6.5 mm pulmonary nodule is seen in the right midlung, best visualized on coronal imaging. Calcified granuloma in the left apex is seen. Limited views of the abdomen demonstrate enlargement of the liver. The bony structures are age appropriate.     1.  No large central pulmonary embolus is appreciated, evaluation of the subsegmental branches is essentially nondiagnostic due to motion artifacts. Additional imaging would be required for definitive exclusion of small distal pulmonary emboli. 2.  Trace pericardial effusion 3.  Hepatomegaly 4.  Atherosclerosis. 5.  Right pulmonary nodules, the largest is a 7.7 mm pulmonary nodule, see nodule follow-up recommendations below. Low Risk: CT at 3-6 months, then consider CT at 18-24 months High Risk: CT at 3-6 months, then at 18-24 months Comments: Use most suspicious nodule as guide to management. Follow-up intervals may vary according to size and risk. Low Risk - Minimal or absent history of smoking and of other known risk factors. High Risk - History of smoking or of other known risk factors. Note: These recommendations do not apply to lung cancer screening, patients with immunosuppression, or patients with known primary cancer. Fleischner Society 2017 Guidelines for Management of Incidentally Detected Pulmonary Nodules in Adults     Ir-picc Line Placement W/ Guidance > Age 5    Result Date: 4/29/2019  HISTORY/REASON FOR EXAM:   PICC placement. TECHNIQUE/EXAM DESCRIPTION AND NUMBER OF VIEWS:   PICC line insertion with ultrasound guidance.  The procedure was performed using maximal sterile barrier technique including sterile gown, mask, cap, and donning of sterile gloves following appropriate hand hygiene and/or sterile scrub. Patient skin site was prepped with 2% Chlorhexidine solution. FINDINGS:  PICC line insertion with Ultrasound Guidance was performed by qualified nursing staff without the  assistance of a Radiologist. PICC positioning appropriateness confirmed by 3CG technology; chest xray only needed in the instance 3CG unable to confirm placement.              Ultrasound-guided PICC placement performed by qualified nursing staff as above.       ASSESSMENT/PLAN:     Muriel Martini is a 68 y.o.  admitted 4/30/2019. 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    Work-up revealed a right iliopsoas lesion and given her history of breast   cancer, there was concern for metastatic malignancy.  She transferred to a skilled nursing facility but there developed fever up to 104 and encephalopathy.  She had a positive urine culture and she was empirically treated for UTI.    Due to new onset confusion she had an MRI of the brain which showed multiple regions and she was again transferred to [facility] for higher level of care.     She was worked up at [facility] and seen by ID.  Per ID notes the differential for the multiple brain lesions was abscess versus metastatic.  Lesions were too small to biopsy per neurosurgery.  TTE and CHAS negative.  Blood cultures negative.  Imaging on 3/27 showed iliopsoas and gluteal fluid collections and drain was placed on 3/29.  Cultures were all negative.  Another drain was placed on 4/4 with purulent fluid recovered.  The abscesses appear to have resolved on CT 4/8.  However repeat CT on 4/19 revealed right gluteal muscle abscess in addition to recurrent abscess in the right iliac muscle.  The abscesses were too small for drainage.  All cultures have been negative, however she had been on antibiotics.  She was initially on vancomycin cefepime and Flagyl.  The cefepime was changed to meropenem per notes on 3/26 due to concern it was causing confusion.  However per subsequent notes she  "continued to encephalopathy/delirium with a have waxing and waning pattern.  CT on 4/23 with unchanged fluid collection in the right gluteal muscle and somewhat increased fluid collection in the right iliac is muscle.  MRI on 4/23 with improvement of the most prominent multifocal brain abscesses.  However there are new enhancing lesions in the left basal ganglia and right cerebellum consistent with new abscess.  ID signed off on 4/27 with plan to continue vancomycin and meropenem through 5/20 and then repeat MRI.    She is subsequently transferred to [facility] to complete her IV antibiotics.  She had been doing well at [facility] until recently when she did began to develop increased thrombocytopenia.  Primary team is concerned that this is due to meropenem and agree consulted ID for antibiotic management.        Brain abscesses  -Original abscesses appear to have improved on therapy.  Per 4/23 MRI: \"supra and infratentorial brain parenchyma. When compared with the previous MRI there has been interval decrease in the size of the lesions. There are also interval reduction in the extent of the surrounding white matter edema in the restricted diffusion consistent with improvement/treatment response of the most of the multifocal brain abscesses. However, there are new enhancing lesions in the left basal ganglia and right cerebellum consistent with interval new abscesses.\"   - This raises concern for lack of source control versus potential metastatic lesions    Gluteal and iliopsoas abscess  - Adjacent to hardware in right hip  - CT on 4/23: \"Fluid collections within the right gluteal and iliacis muscles.. The collection within the right gluteal muscle has unchanged while the fluid collection within the right iliacis muscle is increased somewhat in size.\"     Prior right sacral fracture, hardware in place  - Transsacral screw fixation, right sacral fracture, S1 and S2 on 12/17/18   - Hardware a potential  likely source of infection, " per notes ID spoke with orthopedics and they feel the hardware needs to be in for at least 6 months    Encephalopathy  -Appears to have been present to some degree since admission, likely multifactorial, delirium and due to her brain abscess, cefepime may have contributed but unclear as she continued to have altered mentation after transition to meropenem    Leukopenia, chronic no significant changes    Thrombocytopenia  -Appears to been ongoing with variable lab results since 3/28, slow recent trend down but not significantly lower from prior levels   -Concern this is subsequent to her meropenem    Type 2 diabetes   -Control blood sugars to aid in infection resolution    Altered mental status, improved overall  Appears to be waxing and waning  Multifactorial, likely large contribution from her multiple brain abscesses, monitor  Persistent brain lesions/abscesses noted on repeat MRI on 4/24     H/o breast cancer  -Ongoing concern that the lesions in her brain are metastatic in nature    Plan:    --- Stop meropenem and transition to cefepime 2 g every 8 and Flagyl 5 mg 3 times daily, continue Vanco  --- Monitor cell levels  --- Monitor mental status to see if any re-introducing cefepime causes significant changes in her baseline mental status-Per notes she has had waxing and waning encephalopathy during entire admission  --- If there is concern that the cefepime is causing confusion would recommend repeating MRI brain as well as CT abdomen and pelvis sooner to see if antibiotics can be stopped, otherwise continue antibiotics with repeat imaging on 5/20/2019 and then antibiotic duration to be determined based on imaging          Plan of care discussed with WESTON Mitchell M.D.. Will continue to follow    Geno Junior M.D.

## 2019-05-05 NOTE — [facility]
Hospital Medicine Progress Note, Adult, Complex               Author: Odalys Mitchell Date & Time created: 5/5/2019  11:43 AM     CC: brain abscesses    Interval History:  This is a 68 y.o. female here with history of , history of COPD, diabetes, hypertension, depression, anemia right hip pain, fever and abnormal brain mri. lesions on MRI showed improvement with antibiotics so this is continued for a total of 6 weeks.  The patient is talking on the phone  Speech is less slurred  She is asking for the bedpan this morning    Review of Systems:  Review of Systems   Constitutional: Negative for fever.   HENT: Negative for hearing loss.    Respiratory: Negative for cough and shortness of breath.    Cardiovascular: Negative for chest pain, palpitations and leg swelling.   Gastrointestinal: Negative for abdominal pain, constipation, diarrhea, heartburn, nausea and vomiting.   Genitourinary: Negative for dysuria and hematuria.   Skin: Negative for itching.   Neurological: Positive for weakness. Negative for headaches.       Physical Exam:  Physical Exam   Constitutional: She is oriented to person, place, and time. No distress.   HENT:   Mouth/Throat: No oropharyngeal exudate.   Eyes: Pupils are equal, round, and reactive to light. Conjunctivae are normal. No scleral icterus.   Neck: Neck supple. No tracheal deviation present.   Cardiovascular: Normal rate and regular rhythm.  PMI is displaced.    Pulmonary/Chest: Effort normal and breath sounds normal. No stridor. No respiratory distress. She has no wheezes.   Abdominal: Soft. There is no tenderness.   Musculoskeletal: She exhibits no edema.   Neurological: She is alert and oriented to person, place, and time.   Skin: Skin is warm and dry. No rash noted. She is not diaphoretic.   Vitals reviewed.      Labs:          Recent Labs      05/04/19   0447  05/05/19   0330   SODIUM  133*  131*   POTASSIUM  4.0  4.4   CHLORIDE  99  99   CO2  25  25   BUN  11  13   CREATININE  0.54   0.50   CALCIUM  8.7  8.8     Recent Labs      05/04/19   0447  05/05/19   0330   GLUCOSE  75  75     Recent Labs      05/04/19   0447  05/05/19   0330   RBC  4.21  4.42   HEMOGLOBIN  11.0*  11.8*   HEMATOCRIT  34.7*  36.5*   PLATELETCT  93*  126*     Recent Labs      05/04/19   0447  05/05/19   0330   WBC  3.8*  3.9*           Hemodynamics:   T98.1 /85  RR21 O2 sat 92%     GI/Nutrition:  Orders Placed This Encounter   Procedures   • Diet Order Diabetic     Standing Status:   Standing     Number of Occurrences:   1     Order Specific Question:   Diet:     Answer:   Diabetic [3]     Order Specific Question:   Texture/Fiber modifications:     Answer:   Dysphagia 3(Mechanical Soft)specify fluid consistency(question 6) [3]     Order Specific Question:   Consistency/Fluid modifications:     Answer:   Thin Liquids [3]     Medical Decision Making, by Problem:  Brain abscesses on MRI with improvement on antibiotics   Repeat MRI 5/24 to evaluate brain abscesses   Discussed cell counts with ID and changed antibiotics to cefepime, flagyl and vancomycin   No endocarditis on CHAS   No abscess on MRI of lumbar spine    Pelvic abscess resolved on repeat imaging, drain removed    breast cancer bilateral mastectomy status post chemotherapy, treated over 20 years ago per patient, outpatient follow up    Slurred speech due to brain lesions,    Speech therapy, speech improving  Dysphagia, dysphagia I diet with nectar thick liquids  History of dvt, xarelto  Thrombocytopenia, improving off merrem    Restless leg syndrome, mirapex  Dyslipidemia, pravachol  Essential hypertension, norvasc  Debility, patient resides at a SNF, PT/OT     DNR      Quality-Core Measures   Reviewed items::  Labs reviewed and Medications reviewed  Sue catheter::  No Sue  DVT prophylaxis pharmacological::  Enoxaparin (Lovenox)  Ulcer Prophylaxis::  Yes  Antibiotics:  Treating active infection/contamination beyond 24 hours perioperative  coverage  Assessed for rehabilitation services:  Patient was assess for and/or received rehabilitation services during this hospitalization

## 2019-05-06 LAB
ERYTHROCYTE [DISTWIDTH] IN BLOOD BY AUTOMATED COUNT: 58.4 FL (ref 35.9–50)
HCT VFR BLD AUTO: 35.6 % (ref 37–47)
HGB BLD-MCNC: 11.4 G/DL (ref 12–16)
MCH RBC QN AUTO: 26 PG (ref 27–33)
MCHC RBC AUTO-ENTMCNC: 32 G/DL (ref 33.6–35)
MCV RBC AUTO: 81.3 FL (ref 81.4–97.8)
PLATELET # BLD AUTO: 127 K/UL (ref 164–446)
PMV BLD AUTO: 10.8 FL (ref 9–12.9)
RBC # BLD AUTO: 4.38 M/UL (ref 4.2–5.4)
VANCOMYCIN TROUGH SERPL-MCNC: 17.8 UG/ML (ref 10–20)
WBC # BLD AUTO: 3.4 K/UL (ref 4.8–10.8)

## 2019-05-06 PROCEDURE — 85027 COMPLETE CBC AUTOMATED: CPT

## 2019-05-06 PROCEDURE — 80202 ASSAY OF VANCOMYCIN: CPT

## 2019-05-06 ASSESSMENT — ENCOUNTER SYMPTOMS
CHILLS: 0
DIARRHEA: 0
COUGH: 0
HEARTBURN: 0
HEADACHES: 0
SHORTNESS OF BREATH: 0
ABDOMINAL PAIN: 0
DIAPHORESIS: 0
WEAKNESS: 1
CONSTIPATION: 0
VOMITING: 0
NAUSEA: 0
FEVER: 0
PALPITATIONS: 0
MYALGIAS: 1

## 2019-05-06 NOTE — [facility]
Infectious Disease Progress Note    Author: Geno Junior M.D. Date & Time of service: 5/6/2019  8:38 AM    Chief Complaint:  Brain abscess, iliopsoas abscess, thrombocytopenia    Interval History:  5/6 Interval 24 hours of Vitals/Labs/Micro,and imaging results reviewed as available. See assessment.     Review of Systems:  Review of Systems   Constitutional: Negative for chills and fever.   Gastrointestinal: Negative for abdominal pain, diarrhea, nausea and vomiting.   Musculoskeletal: Positive for joint pain and myalgias.       Hemodynamics:  No data recorded.     No Data Recorded           Physical Exam:  Physical Exam   Constitutional: She appears well-developed and well-nourished.   HENT:   Head: Normocephalic and atraumatic.   Eyes: Pupils are equal, round, and reactive to light. Conjunctivae and EOM are normal.   Cardiovascular: Normal rate, regular rhythm and normal heart sounds.    Pulmonary/Chest: Effort normal and breath sounds normal.   Abdominal: Soft. Bowel sounds are normal. She exhibits no distension. There is no tenderness. There is no rebound and no guarding.   Neurological: She is alert.   Appears somewhat confused, moaning in pain   Skin: Skin is warm and dry.       Meds:  No current facility-administered medications for this encounter.     Labs:  Recent Labs      05/04/19 0447 05/05/19 0330  05/06/19   0410   WBC  3.8*  3.9*  3.4*   RBC  4.21  4.42  4.38   HEMOGLOBIN  11.0*  11.8*  11.4*   HEMATOCRIT  34.7*  36.5*  35.6*   MCV  82.4  82.6  81.3*   MCH  26.1*  26.7*  26.0*   RDW  60.2*  60.4*  58.4*   PLATELETCT  93*  126*  127*   MPV  10.5  11.1  10.8     Recent Labs      05/04/19 0447 05/05/19   0330   SODIUM  133*  131*   POTASSIUM  4.0  4.4   CHLORIDE  99  99   CO2  25  25   GLUCOSE  75  75   BUN  11  13     Recent Labs      05/04/19 0447 05/05/19   0330   CREATININE  0.54  0.50       Imaging:  Ct-abdomen-pelvis With    Result Date: 4/19/2019 4/18/2019 7:00 PM HISTORY/REASON  FOR EXAM: Post-op infection, abdomen pelvis, known TECHNIQUE/EXAM DESCRIPTION:  CT abdomen and pelvis with contrast. Sequential axial CT images were obtained from lung bases to the proximal femurs after the uneventful administration of 100 cc Omnipaque 350 intravenous contrast. Low dose optimization technique was utilized for this CT exam including automated exposure control and adjustment of the mA and/or kV according to patient size. COMPARISON:  April 8, 2019 CT ABDOMEN FINDINGS: Linear densities are seen within the lung bases, appearance favors atelectasis. Trace pericardial effusion is noted. The liver is normal in contour. The liver is normal in size. No intrahepatic biliary ductal dilatation is noted. The gallbladder appears within normal limits. The spleen is unremarkable. The pancreas is grossly normal. Bilateral adrenal glands appear within normal limits. The kidneys are unremarkable.  The ureters are normal in caliber along their visualized course. The colon appears within normal limits. There is fluid filled distention of the small bowel is seen. Levoscoliosis of the lumbar spine is seen. Atherosclerotic calcifications are seen. CT PELVIS FINDINGS: The bladder is within normal limits. Postsurgical changes of hysterectomy are noted. Enhancing fluid collection is seen in the right gluteal musculature measuring 2.0 cm. Multiloculated appearing density fluid collection along the right iliacus muscle is  seen measuring approximately 3.4 x 2.0 cm. Enlarged bilateral inguinal lymph nodes are noted. Postsurgical changes of bilateral SI joints and sacral screw fixation are noted. Bony remodeling of right iliac and sacral fractures is observed.     1.  Enhancing fluid collection in the right gluteal muscle, appearance compatible with small abscess. 2.  Loculated enhancing fluid collection in the right iliac is muscle, corresponding with site of prior drain, appearance compatible with reaccumulation of abscess. 3.   Fluid-filled distention of small bowel suggests ileus 4.  Atherosclerosis 5.  Trace pericardial effusion    Ct-abdomen-pelvis With    Result Date: 4/8/2019 4/8/2019 4:24 PM HISTORY/REASON FOR EXAM: Abdominal infection, abdominal pain Metastatic breast cancer TECHNIQUE/EXAM DESCRIPTION:  CT scan of the abdomen and pelvis with contrast. Contrast-enhanced helical scanning was obtained from the diaphragmatic domes through the pubic symphysis following the bolus administration of nonionic contrast without complication. 100 mL of Omnipaque 350 nonionic contrast was administered without complication. Low dose optimization technique was utilized for this CT exam including automated exposure control and adjustment of the mA and/or kV according to patient size. COMPARISON: CT abdomen and pelvis 4/2/2019 FINDINGS: Lung bases: The lung bases are clear. Bilateral breast implants are present. Osseous structures:  Surgical screw traverses both sacroiliac joint. There is fracture of the right ilium. There appears to be destructive change of the right medial sacrum. This is most consistent with known metastatic disease. Abdomen: The liver is enlarged without focal abnormality. There is no biliary dilation. The spleen is normal in appearance. The pancreas is normal in appearance. The splenic vein and portal vein enhance normally. Both adrenal glands are normal in appearance. The right kidney is normal in appearance. The left kidney is normal in appearance. There is no hydronephrosis. Bowel and mesentery: Within normal limits. The aorta shows atherosclerosis without aneurysm. The terminal ileum is normal in appearance. The appendix is not identified. Pelvis: There is a drainage catheter within or just anterior to the right iliacus muscle. There is no residual associated fluid. Right gluteal drainage catheter has been removed.     1.  Resolution of right iliacus abscess with no associated fluid adjacent to the drainage catheter 2.   Interval removal of right gluteal drainage catheter 3.  Surgical screw traversing both sacroiliac joint with associated pathologic fracture of the right ilium and right medial sacrum 4.  Hepatomegaly    Ct-chest,abdomen,pelvis With    Result Date: 4/23/2019 4/23/2019 8:57 PM HISTORY/REASON FOR EXAM: Breast cancer. Shortness of breath. TECHNIQUE/EXAM DESCRIPTION: CT scan of the chest, abdomen and pelvis with contrast. Thin-section helical scanning was obtained with intravenous contrast from the lung apices through the pubic symphysis to include the chest, abdomen and pelvis. 80 mL of Omnipaque 350 nonionic contrast was administered intravenously without complication. Low dose optimization technique was utilized for this CT exam including automated exposure control and adjustment of the mA and/or kV according to patient size. COMPARISON: None Available FINDINGS: The entire study is degraded by patient respiratory motion artifact. CT CHEST: There is limited evaluation of the lungs due to extensive patient respiratory motion artifact. There is a linear atelectasis with groundglass opacity bilaterally. There are bilateral breast implants. There are bilateral axillary lymph nodes seen which measure up to 13 mm in short axis dimension bilaterally. There are surgical clips within the axilla. There are small bilateral thyroid nodules. There is no evidence of pericardial effusion. There is moderate atherosclerotic change of the aorta or coronary vessels. CT ABDOMEN: There is fatty change of the liver. The spleen is enlarged. There is dense material layering dependently within the gallbladder consistent with gallstones versus vicarious excretion of contrast. The spleen is normal. The kidneys are normal. The adrenal glands are normal. The pancreas is normal. There are retroperitoneal lymph nodes which measure less than 10 mm in short axis dimension. Similar sized periportal and portacaval lymph nodes are seen as well. CT  PELVIS: The appendix is not identified. There is moderate constipation. There is no evidence of bowel obstruction or inflammatory change. There is no evidence of free fluid. There screws extending through the sacrum bilaterally. There is surrounding lucency and fragmentation with fracture extending into the right ilium. There is some fluid seen within the right gluteal and iliac is muscles. The right gluteal fluid collection  measures 18 mm in size and is not significantly changed. The fluid collection within the right iliacis muscle has increased in size now measures 2.7 cm.     1.  Limited evaluation of the thorax due to extensive patient respiratory motion artifact. 2.  Borderline enlarged bilateral axillary lymph nodes. 3.  Linear atelectasis within the lungs with patchy groundglass opacity bilaterally. 4.  Again seen screws extending through the sacrum bilaterally. There is surrounding lucency and fragmentation of the sacrum as well as the right greater than left ilium and a right iliac fracture. It is uncertain if these represent metastatic lesions with pathologic fracture versus insufficiency fractures. 5.  Fluid collections within the right gluteal and iliacis muscles.. The collection within the right gluteal muscle has unchanged while the fluid collection within the right iliacis muscle is increased somewhat in size. Differential diagnosis includes hematoma as well as abscess. 6.  Enlarged retroperitoneal, periportal and portacaval lymph nodes.    Ct-head With & W/o    Result Date: 4/13/2019 4/13/2019 4:12 PM HISTORY/REASON FOR EXAM:  History of septic emboli, follow-up. TECHNIQUE/EXAM DESCRIPTION AND NUMBER OF VIEWS: CT scan of the head without and with contrast. The study was performed on a helical multidetector CT scanner. Contiguous axial sections were obtained from the skull base through the vertex both prior to and after the administration of IV contrast. 100 mL of Omnipaque 350 nonionic contrast was  injected intravenously. Up to date radiation dose reduction adjustments have been utilized to meet ALARA standards for radiation dose reduction. COMPARISON:  CT dated 3/30/2019, 3/17/2019, MR dated 3/7/2019 FINDINGS: There are persistent small hyperenhancing lesions scattered throughout the brain. Largest lesion in the left basal ganglia measures 4.6 mm and demonstrates mild associated edema. Largest lesion in the right cerebral hemisphere measures 4 mm, also with mild associated edema. Several enhancing lesions are also seen in the brainstem. All of the lesions appear stable since prior. No new or enlarging lesions are detected The calvariae and skull base are unremarkable. There are no extraaxial fluid collections. The ventricular system and basal cisterns are within normal limits. There are no hemorrhagic lesions. The paranasal sinuses and mastoids in the field of view are unremarkable. Mild atrophy and chronic small vessel ischemic changes.     1.  No change in scattered hyperenhancing lesions throughout the brain, cerebellum and visualized brainstem. Some of them demonstrate mild associated edema, similar to prior study. 2.  No CT evidence of infarct or hemorrhage.    Dx-chest-limited (1 View)    Result Date: 4/22/2019 4/22/2019 8:40 PM HISTORY/REASON FOR EXAM:  Shortness of Breath TECHNIQUE/EXAM DESCRIPTION AND NUMBER OF VIEWS: Single portable view of the chest. COMPARISON: 4/16/2019 FINDINGS: HEART: Stable size. LUNGS: No areas of air space disease are demonstrated. PLEURA: No effusion or pneumothorax.     No acute cardiac or pulmonary abnormalities are identified. Lung volumes are low.    Dx-chest-portable (1 View)    Result Date: 4/30/2019 4/30/2019 4:19 PM HISTORY/REASON FOR EXAM:  PICC line placement. IV access necessity. TECHNIQUE/EXAM DESCRIPTION AND NUMBER OF VIEWS: Single portable view of the chest. COMPARISON:  4/22/2019 FINDINGS: A right arm PICC line is present in satisfactory position with its  tip projecting over the SVC at the level of the jennifer. The cardiac silhouette is within normal limits. No infiltrates or consolidations are noted. No pleural effusion is noted.     1.  Right arm PICC line tip projects in satisfactory position. 2.  Clear chest.    Dx-chest-portable (1 View)    Result Date: 4/16/2019 4/16/2019 5:43 AM HISTORY/REASON FOR EXAM: Shortness of Breath TECHNIQUE/EXAM DESCRIPTION:  Single AP view of the chest. COMPARISON: March 4, 2019 FINDINGS: Cardiomegaly is observed. The mediastinal contour appears within normal limits.  The central  pulmonary vasculature appears prominent and indistinct. Bilateral lung volumes are diminished.  Diffuse scattered hazy pulmonary parenchymal opacities are seen. No significant pleural effusions are identified. The bony structures appear age-appropriate.     1.  Mild pulmonary edema and/or infiltrate 2.  Cardiomegaly    Mr-brain-with & W/o    Result Date: 4/24/2019 4/24/2019 3:42 PM HISTORY/REASON FOR EXAM:  eval change of brain abscesses. TECHNIQUE/EXAM DESCRIPTION:   MRI of the brain without and with contrast. T1 sagittal, T2 fast spin-echo axial, T1 coronal, FLAIR coronal, diffusion-weighted and apparent diffusion coefficient (ADC map) axial images were obtained of the whole brain. T1 postcontrast axial and T1 postcontrast coronal images were obtained. The study was performed on a [employer] Signa 1.5 Miranda MRI scanner. 6 mL Gadavist contrast was administered intravenously. COMPARISON:  3/7/2090 FINDINGS:     The post gadolinium sequences demonstrates multifocal areas of nodular enhancement seen throughout the supra and infratentorial brain parenchyma. Some of the lesions demonstrates surrounding T2 hyperintensity. There is no acute infarct. There is no acute or chronic parenchymal hemorrhage. There is no hydrocephalus. Mild cerebral volume loss is seen. There is no extra-axial fluid collection, hemorrhage or mass. The vertebrobasilar system is diminutive in  caliber. There are fetal origins of bilateral posterior cerebral arteries. There is no large lesion identified in the expected course of the intracranial portions of the cranial nerves. The skull bones are unremarkable. The paranasal sinuses are clear. The extracranial soft tissue including orbits appear grossly normal.     Multiple punctate enhancing lesions seen throughout the supra and infratentorial brain parenchyma. When compared with the previous MRI there has been interval decrease in the size of the lesions. There are also interval reduction in the extent of the surrounding white matter edema in the restricted diffusion consistent with improvement/treatment response of the most of the multifocal brain abscesses. However there are new enhancing lesions in the left basal ganglia and right cerebellum consistent with interval new abscesses.Follow-up is recommended to ensure the complete resolution of the enhancing lesions. Please note that the radiological differential diagnosis of this multifocal lesions still includes metastasis. However decrease in the size of the most of the lesions favors the diagnosis of infection.     Ct-cta Chest Pulmonary Artery W/ Recons    Result Date: 4/16/2019 4/16/2019 6:13 AM HISTORY/REASON FOR EXAM:  Shortness of breath TECHNIQUE/EXAM DESCRIPTION:  CT angiogram of the chest with contrast. Transaxial MDCT scan of chest post bolus 55 cc Omnipaque 350 IV administration. MIP reconstructed images were created and reviewed. Low dose optimization technique was utilized for this CT exam including automated exposure control and adjustment of the mA and/or kV according to patient size. COMPARISON:  March 5, 2019 FINDINGS: The visualized portion of the thyroid appear within normal limits.  The trachea and main stem airways are normal in caliber. There are no pathologically enlarged mediastinal lymph nodes. Trace pericardial effusion is seen, otherwise the heart and pericardium appear  within normal limits.  Atherosclerotic calcifications are identified.  The pulmonary arteries are well opacified, no large central pulmonary arterial filling defect is appreciated, respiratory motion artifacts limit evaluation of the subsegmental pulmonary arterial branches. No pulmonary consolidation is identified. 7.7 mm medial pleural-based pulmonary nodule is seen on image 140. 6.5 mm pulmonary nodule is seen in the right midlung, best visualized on coronal imaging. Calcified granuloma in the left apex is seen. Limited views of the abdomen demonstrate enlargement of the liver. The bony structures are age appropriate.     1.  No large central pulmonary embolus is appreciated, evaluation of the subsegmental branches is essentially nondiagnostic due to motion artifacts. Additional imaging would be required for definitive exclusion of small distal pulmonary emboli. 2.  Trace pericardial effusion 3.  Hepatomegaly 4.  Atherosclerosis. 5.  Right pulmonary nodules, the largest is a 7.7 mm pulmonary nodule, see nodule follow-up recommendations below. Low Risk: CT at 3-6 months, then consider CT at 18-24 months High Risk: CT at 3-6 months, then at 18-24 months Comments: Use most suspicious nodule as guide to management. Follow-up intervals may vary according to size and risk. Low Risk - Minimal or absent history of smoking and of other known risk factors. High Risk - History of smoking or of other known risk factors. Note: These recommendations do not apply to lung cancer screening, patients with immunosuppression, or patients with known primary cancer. Fleischner Society 2017 Guidelines for Management of Incidentally Detected Pulmonary Nodules in Adults     Ir-picc Line Placement W/ Guidance > Age 5    Result Date: 4/29/2019  HISTORY/REASON FOR EXAM:   PICC placement. TECHNIQUE/EXAM DESCRIPTION AND NUMBER OF VIEWS:   PICC line insertion with ultrasound guidance.  The procedure was performed using maximal sterile barrier  technique including sterile gown, mask, cap, and donning of sterile gloves following appropriate hand hygiene and/or sterile scrub. Patient skin site was prepped with 2% Chlorhexidine solution. FINDINGS:  PICC line insertion with Ultrasound Guidance was performed by qualified nursing staff without the assistance of a Radiologist. PICC positioning appropriateness confirmed by 3CG technology; chest xray only needed in the instance 3CG unable to confirm placement.              Ultrasound-guided PICC placement performed by qualified nursing staff as above.       Micro:  Results     Procedure Component Value Units Date/Time    MRSA BY PCR (AMP) [[id number]] Collected:  04/30/19 1545    Order Status:  Completed Specimen:  Respirate Updated:  05/01/19 9515     Significant Indicator NEG     Source RESP     Site -     MRSA PCR Negative for MRSA by PCR.    MRSA BY PCR (AMP) [[id number]]     Order Status:  No result Specimen:  Respirate from Nares         ASSESSMENT/PLAN:      Muriel Martini is a 68 y.o.  admitted 4/30/2019. Pt has a past medical history of diabetes, SANTOSH of right hip with hardware in place and breast cancer who was originally admitted to [facility] on 03/08/2019 as a transfer from an outside hospital for right hip and pelvic pain.    Work-up revealed a right iliopsoas lesion and given her history of breast  cancer, there was concern for metastatic malignancy.  She transferred to a skilled nursing facility but there developed fever up to 104 and encephalopathy.  Due to new onset confusion she had an MRI of the brain which showed multiple regions and she was again transferred to [facility] for higher level of care.      She was worked up at [facility] and seen by ID.  Per ID notes the differential for the multiple brain lesions was abscess versus metastatic.  Lesions were too small to biopsy per neurosurgery.  TTE and CHAS negative.  Blood cultures negative.  Imaging on 3/27 showed iliopsoas and gluteal  "fluid collections and drain was placed on 3/29.  Cultures were all negative.  Another drain was placed on 4/4 with purulent fluid recovered.  The abscesses appear to have resolved on CT 4/8.  However repeat CT on 4/19 revealed right gluteal muscle abscess in addition to recurrent abscess in the right iliac muscle.  The abscesses were too small for drainage.  All cultures have been negative, however she had been on antibiotics.  She was initially on vancomycin cefepime and Flagyl.  The cefepime was changed to meropenem per notes on 3/26 due to concern it was causing confusion.  However per subsequent notes she continued to encephalopathy/delirium with a have waxing and waning pattern.  CT on 4/23 with unchanged fluid collection in the right gluteal muscle and somewhat increased fluid collection in the right iliac is muscle.  MRI on 4/23 with improvement of the most prominent multifocal brain abscesses.  However there are new enhancing lesions in the left basal ganglia and right cerebellum consistent with new abscess.  ID signed off on 4/27 with plan to continue vancomycin and meropenem through 5/20 and then repeat MRI.     She is subsequently transferred to [facility] to complete her IV antibiotics.  She had been doing well at [facility] until recently when she did began to develop increased thrombocytopenia.  Primary team is concerned that this is due to meropenem and agree consulted ID for antibiotic management.      Interval 24 hour assessment:    AF, O2 RA,    Labs reviewed    Pt continued on vanco, cefepime and flagyl.  She has new onset left hip pain which appears to be severe.       Left hip pain, new   -Yesterday she was complaining of pain in both hips -unclear etiology as her prior fracture and infection is on the right side.     Brain abscesses  -Original abscesses appear to have improved on therapy.  Per 4/23 MRI: \"supra and infratentorial brain parenchyma. When compared with the previous MRI there has been interval " "decrease in the size of the lesions. There are also interval reduction in the extent of the surrounding white matter edema in the restricted diffusion consistent with improvement/treatment response of the most of the multifocal brain abscesses. However, there are new enhancing lesions in the left basal ganglia and right cerebellum consistent with interval new abscesses.\"   - This raises concern for lack of source control versus potential metastatic lesions     Gluteal and iliopsoas abscess  - Adjacent to hardware in right hip  - CT on 4/23: \"Fluid collections within the right gluteal and iliacis muscles.. The collection within the right gluteal muscle has unchanged while the fluid collection within the right iliacis muscle is increased somewhat in size.\"      Prior right sacral fracture, hardware in place  - Transsacral screw fixation, right sacral fracture, S1 and S2 on 12/17/18   - Hardware a potential  likely source of infection, per notes ID spoke with orthopedics and they feel the hardware needs to be in for at least 6 months     Encephalopathy  -Appears to have been present to some degree since admission, likely multifactorial, delirium and due to her brain abscess, cefepime may have contributed but unclear as she continued to have altered mentation after transition to meropenem     Leukopenia, chronic no significant changes     Thrombocytopenia-stable  -Appears to been ongoing with variable lab results since 3/28, slow recent trend down but not significantly lower from prior levels   -Concern this is subsequent to her meropenem, transition to cefepime on 5/4     Type 2 diabetes   -Control blood sugars to aid in infection resolution     Altered mental status, improved overall  Appears to be waxing and waning  Multifactorial, likely large contribution from her multiple brain abscesses, monitor  Persistent brain lesions/abscesses noted on repeat MRI on 4/24     H/o breast cancer  -Ongoing concern that the " lesions in her brain are metastatic in nature     Plan:     --- Recommend imaging of left hip, could start with x-ray and if unrevealing and she still continues to complain of severe pain would get CT abdomen pelvis with contrast  --- Continue cefepime 2 g every 8 and Flagyl 5 mg 3 times daily, continue Vanco  --- Monitor cell levels  --- Monitor mental status to see if any re-introducing cefepime causes significant changes in her baseline mental status-Per notes she has had waxing and waning encephalopathy during entire admission  --- If there is concern that the cefepime is causing confusion would recommend repeating MRI brain as well as CT abdomen and pelvis sooner to see if antibiotics can be stopped, otherwise continue antibiotics with repeat imaging on 5/20/2019 and then antibiotic duration to be determined based on imaging           Plan of care discussed with WESTON Mitchell M.D.. Will continue to follow     Geno Junior M.D.

## 2019-05-06 NOTE — DOCUMENTATION QUERY
[facility]                                                                       Query Response Note      PATIENT:               MARLENA CLIFFORD  ACCT #:                  [id number]  MRN:                     [id number]  :                      1950  ADMIT DATE:       3/8/2019 3:56 PM  DISCH DATE:        2019 2:30 PM  RESPONDING  PROVIDER #:        [id number]           QUERY TEXT:    Depression is documented in the Medical Record.  Please specify the type.    NOTE:  If an appropriate response is not listed below, please respond with a new note.              The patient's Clinical Indicators include:  H&P Assessment and Plan:  Depression and anxiety  Patient discharged on Seroquel.    Query created by: Mell Bacon on 2019 8:01 AM    RESPONSE TEXT:    Unable to determine          Electronically signed by:  CIRO EDMONDS MD 2019 9:47 AM

## 2019-05-06 NOTE — [facility]
Hospital Medicine Progress Note, Adult, Complex               Author: Odalys Mitchell Date & Time created: 5/6/2019  2:09 PM     CC: brain abscesses    Interval History:  This is a 68 y.o. female here with history of , history of COPD, diabetes, hypertension, depression, anemia right hip pain, fever and abnormal brain mri. lesions on MRI showed improvement with antibiotics so this is continued for a total of 6 weeks.  Today she had increased bilateral hip pain after being moved in bed    Review of Systems:  Review of Systems   Constitutional: Negative for diaphoresis and fever.   HENT: Negative for hearing loss.    Respiratory: Negative for cough and shortness of breath.    Cardiovascular: Negative for chest pain, palpitations and leg swelling.   Gastrointestinal: Negative for abdominal pain, constipation, diarrhea, heartburn, nausea and vomiting.   Genitourinary: Negative for dysuria and hematuria.   Musculoskeletal: Positive for myalgias.        Bilateral hip pain after being repositioned in bed   Skin: Negative for itching.   Neurological: Positive for weakness. Negative for headaches.       Physical Exam:  Physical Exam   Constitutional: She is oriented to person, place, and time. No distress.   HENT:   Mouth/Throat: Oropharynx is clear and moist.   Eyes: Pupils are equal, round, and reactive to light. Conjunctivae are normal. No scleral icterus.   Neck: Neck supple. No tracheal deviation present.   Cardiovascular: Normal rate and regular rhythm.  PMI is displaced.    Pulmonary/Chest: Effort normal and breath sounds normal. No stridor.   Abdominal: Soft. Bowel sounds are normal. There is no tenderness.   Musculoskeletal: She exhibits no edema.   Neurological: She is alert and oriented to person, place, and time.   Skin: Skin is warm. No rash noted. She is not diaphoretic. No erythema.   Vitals reviewed.      Labs:          Recent Labs      05/04/19   0447  05/05/19   0330   SODIUM  133*  131*   POTASSIUM  4.0   4.4   CHLORIDE  99  99   CO2  25  25   BUN  11  13   CREATININE  0.54  0.50   CALCIUM  8.7  8.8     Recent Labs      05/04/19 0447  05/05/19   0330   GLUCOSE  75  75     Recent Labs      05/04/19 0447 05/05/19   0330  05/06/19   0410   RBC  4.21  4.42  4.38   HEMOGLOBIN  11.0*  11.8*  11.4*   HEMATOCRIT  34.7*  36.5*  35.6*   PLATELETCT  93*  126*  127*     Recent Labs      05/04/19 0447 05/05/19   0330  05/06/19   0410   WBC  3.8*  3.9*  3.4*           Hemodynamics:   T98.4 /77  RR20 O2 sat 94%     GI/Nutrition:  Orders Placed This Encounter   Procedures   • Diet Order Diabetic     Standing Status:   Standing     Number of Occurrences:   1     Order Specific Question:   Diet:     Answer:   Diabetic [3]     Order Specific Question:   Texture/Fiber modifications:     Answer:   Dysphagia 3(Mechanical Soft)specify fluid consistency(question 6) [3]     Order Specific Question:   Consistency/Fluid modifications:     Answer:   Thin Liquids [3]     Medical Decision Making, by Problem:  Brain abscesses on MRI with improvement on antibiotics   Repeat MRI 5/24 to evaluate brain abscesses   ID following and changed antibiotics to cefepime, flagyl and vancomycin due to decreased cell counts on merrem   No endocarditis on CHAS   No abscess on MRI of lumbar spine    Pelvic abscess resolved on repeat imaging, drain removed   May need to repeat imaging if pain in hips continues to be an issue for  The patient    breast cancer bilateral mastectomy status post chemotherapy, treated over 20 years ago per patient, outpatient follow up    Slurred speech due to brain lesions,    Speech therapy, speech improving  Dysphagia, dysphagia I diet with nectar thick liquids, advance as tolerated and advised by speech therapy  History of dvt, xarelto  Thrombocytopenia, improved off merrem    Restless leg syndrome, mirapex  Dyslipidemia, pravachol  Essential hypertension, norvasc  Debility, patient resides at a SNF, PT/OT      DNR      Quality-Core Measures   Reviewed items::  Labs reviewed and Medications reviewed  Sue catheter::  No Sue  DVT prophylaxis pharmacological::  Enoxaparin (Lovenox)  Ulcer Prophylaxis::  Yes  Antibiotics:  Treating active infection/contamination beyond 24 hours perioperative coverage  Assessed for rehabilitation services:  Patient was assess for and/or received rehabilitation services during this hospitalization

## 2019-05-07 ASSESSMENT — ENCOUNTER SYMPTOMS: FEVER: 0

## 2019-05-07 NOTE — [facility]
Hospital Medicine Progress Note, Adult, Complex               Author: Elvira GARDENIA Cox Date & Time created: 5/7/2019  8:22 AM     CC: brain abscesses    Interval History:  Moaned all day yesterday per staff  Anxious at night  Says yes to every question I ask her    Review of Systems:  Review of Systems   Constitutional: Negative for fever.       T 97.5 P 95 /73 RR 18 SaO2 97% I/O 1.4/inc  Physical Exam   Constitutional: She appears well-developed. No distress.   HENT:   Head: Normocephalic and atraumatic.   Eyes: Conjunctivae are normal. Right eye exhibits no discharge. Left eye exhibits no discharge. No scleral icterus.   Neck: No tracheal deviation present.   Cardiovascular: Normal rate, regular rhythm and intact distal pulses.    Pulmonary/Chest: Effort normal. No respiratory distress. She has no wheezes. She exhibits no tenderness.   Abdominal: Soft. Bowel sounds are normal. She exhibits no distension. There is no tenderness.   Musculoskeletal: She exhibits no edema.   Neurological: She is alert.   Skin: Skin is warm.   Psychiatric:   Says yes to everything, thought it was February and she was in [place]   Vitals reviewed.      Labs:          Recent Labs      05/05/19   0330   SODIUM  131*   POTASSIUM  4.4   CHLORIDE  99   CO2  25   BUN  13   CREATININE  0.50   CALCIUM  8.8     Recent Labs      05/05/19   0330   GLUCOSE  75     Recent Labs      05/05/19   0330  05/06/19   0410   RBC  4.42  4.38   HEMOGLOBIN  11.8*  11.4*   HEMATOCRIT  36.5*  35.6*   PLATELETCT  126*  127*     Recent Labs      05/05/19   0330  05/06/19   0410   WBC  3.9*  3.4*          GI/Nutrition:  Orders Placed This Encounter   Procedures   • Diet Order Diabetic     Standing Status:   Standing     Number of Occurrences:   1     Order Specific Question:   Diet:     Answer:   Diabetic [3]     Order Specific Question:   Texture/Fiber modifications:     Answer:   Dysphagia 3(Mechanical Soft)specify fluid consistency(question 6) [3]     Order  Specific Question:   Consistency/Fluid modifications:     Answer:   Thin Liquids [3]     Medical Decision Making, by Problem:  Brain abscesses   -Vanco, cefepime, flagyl 5/25   -repeat MRI 5/24   -MRI 4/24 with some decrease in size and areas of new abscess  R gluteal/iliac abscess   -still present and slightly enlarged on last CT   -defer to ID  H/o breast cancer with reconstruction  DM2  Metabolic encephalopathy   -seroquel  RLS   -mirapex  H/o DVT   -xarelto  Thrombocytopenia   -follow with abx change  R lung pulm nodule   -outpatient f/u  HTN   -norvasc, cozaar  HLD   -lipitor  GERD  Hyponatremia  Hypercalcemia   -sensipar  Debility      Quality-Core Measures   Reviewed items::  Medications reviewed  Sue catheter::  No Sue  Central line in place:  Concentrated IV drugs  DVT: xarelto.  Antibiotics:  Treating active infection/contamination beyond 24 hours perioperative coverage

## 2019-05-08 LAB
ANION GAP SERPL CALC-SCNC: 7 MMOL/L (ref 0–11.9)
BASOPHILS # BLD AUTO: 1.2 % (ref 0–1.8)
BASOPHILS # BLD: 0.04 K/UL (ref 0–0.12)
BUN SERPL-MCNC: 15 MG/DL (ref 8–22)
CALCIUM SERPL-MCNC: 9.8 MG/DL (ref 8.4–10.2)
CHLORIDE SERPL-SCNC: 103 MMOL/L (ref 96–112)
CO2 SERPL-SCNC: 22 MMOL/L (ref 20–33)
CREAT SERPL-MCNC: 0.63 MG/DL (ref 0.5–1.4)
EOSINOPHIL # BLD AUTO: 0.33 K/UL (ref 0–0.51)
EOSINOPHIL NFR BLD: 10.1 % (ref 0–6.9)
ERYTHROCYTE [DISTWIDTH] IN BLOOD BY AUTOMATED COUNT: 60.2 FL (ref 35.9–50)
GLUCOSE SERPL-MCNC: 74 MG/DL (ref 65–99)
HCT VFR BLD AUTO: 35.1 % (ref 37–47)
HGB BLD-MCNC: 11.1 G/DL (ref 12–16)
IMM GRANULOCYTES # BLD AUTO: 0.01 K/UL (ref 0–0.11)
IMM GRANULOCYTES NFR BLD AUTO: 0.3 % (ref 0–0.9)
LYMPHOCYTES # BLD AUTO: 0.84 K/UL (ref 1–4.8)
LYMPHOCYTES NFR BLD: 25.6 % (ref 22–41)
MCH RBC QN AUTO: 26.1 PG (ref 27–33)
MCHC RBC AUTO-ENTMCNC: 31.6 G/DL (ref 33.6–35)
MCV RBC AUTO: 82.6 FL (ref 81.4–97.8)
MONOCYTES # BLD AUTO: 0.79 K/UL (ref 0–0.85)
MONOCYTES NFR BLD AUTO: 24.1 % (ref 0–13.4)
NEUTROPHILS # BLD AUTO: 1.27 K/UL (ref 2–7.15)
NEUTROPHILS NFR BLD: 38.7 % (ref 44–72)
NRBC # BLD AUTO: 0 K/UL
NRBC BLD-RTO: 0 /100 WBC
PLATELET # BLD AUTO: 174 K/UL (ref 164–446)
PMV BLD AUTO: 11 FL (ref 9–12.9)
POTASSIUM SERPL-SCNC: 4.8 MMOL/L (ref 3.6–5.5)
RBC # BLD AUTO: 4.25 M/UL (ref 4.2–5.4)
SODIUM SERPL-SCNC: 132 MMOL/L (ref 135–145)
WBC # BLD AUTO: 3.3 K/UL (ref 4.8–10.8)

## 2019-05-08 PROCEDURE — 85025 COMPLETE CBC W/AUTO DIFF WBC: CPT

## 2019-05-08 PROCEDURE — 80048 BASIC METABOLIC PNL TOTAL CA: CPT

## 2019-05-08 ASSESSMENT — ENCOUNTER SYMPTOMS
CONSTIPATION: 1
MYALGIAS: 1
ABDOMINAL PAIN: 0
COUGH: 0
NAUSEA: 0
DIARRHEA: 0
FEVER: 0
SORE THROAT: 0
SHORTNESS OF BREATH: 0
CHILLS: 0
VOMITING: 0

## 2019-05-08 NOTE — [facility]
Hospital Medicine Progress Note, Adult, Complex               Author: Elvira VARNER Kenny Date & Time created: 5/8/2019  6:58 AM     CC: brain abscesses    Interval History:  Improved hip pain per RN and patient  ANC borderline  Lethargic with therapy and declined yesterday   Poor po intake yesterday    Review of Systems:  Review of Systems   Constitutional: Negative for fever.   HENT: Negative for sore throat.    Respiratory: Negative for cough and shortness of breath.    Cardiovascular: Negative for chest pain.   Gastrointestinal: Negative for abdominal pain, nausea and vomiting.   Musculoskeletal:        Hip pain       T 97.3 P77BP 114/57 RR 18 SaO2 93% I/O 1/inc  Physical Exam   Constitutional: She appears well-developed. No distress.   HENT:   Head: Normocephalic and atraumatic.   Eyes: Conjunctivae are normal. Right eye exhibits no discharge. Left eye exhibits no discharge. No scleral icterus.   Neck: No tracheal deviation present.   Cardiovascular: Normal rate, regular rhythm and intact distal pulses.    Pulmonary/Chest: Effort normal. No respiratory distress. She has no wheezes. She exhibits no tenderness.   Abdominal: Soft. Bowel sounds are normal. She exhibits no distension. There is no tenderness.   Musculoskeletal: She exhibits no edema.   Neurological:   Easily awakened   Skin: Skin is warm.   Vitals reviewed.      Labs:          Recent Labs      05/08/19   0316   SODIUM  132*   POTASSIUM  4.8   CHLORIDE  103   CO2  22   BUN  15   CREATININE  0.63   CALCIUM  9.8     Recent Labs      05/08/19   0316   GLUCOSE  74     Recent Labs      05/06/19   0410  05/08/19   0316   RBC  4.38  4.25   HEMOGLOBIN  11.4*  11.1*   HEMATOCRIT  35.6*  35.1*   PLATELETCT  127*  174     Recent Labs      05/06/19   0410  05/08/19   0316   WBC  3.4*  3.3*   NEUTSPOLYS   --   38.70*   LYMPHOCYTES   --   25.60   MONOCYTES   --   24.10*   EOSINOPHILS   --   10.10*   BASOPHILS   --   1.20          GI/Nutrition:  Orders Placed This  Encounter   Procedures   • Diet Order Diabetic     Standing Status:   Standing     Number of Occurrences:   1     Order Specific Question:   Diet:     Answer:   Diabetic [3]     Order Specific Question:   Texture/Fiber modifications:     Answer:   Dysphagia 3(Mechanical Soft)specify fluid consistency(question 6) [3]     Order Specific Question:   Consistency/Fluid modifications:     Answer:   Thin Liquids [3]     Medical Decision Making, by Problem:  Brain abscesses   -Vanco, cefepime, flagyl 5/25   -repeat MRI 5/24   -MRI 4/24 with some decrease in size and areas of new abscess  R gluteal/iliac abscess   -still present and slightly enlarged on last CT   -defer to ID  H/o breast cancer with reconstruction  DM2  Metabolic encephalopathy   -seroquel (decrease to 25)   -follow   RLS   -mirapex  H/o DVT   -xarelto  Thrombocytopenia-resolved  Leukopenia   -monitor ANC  R lung pulm nodule   -outpatient f/u  HTN   -norvasc, cozaar  HLD   -lipitor  GERD  Hyponatremia  Poor oral intake   Add megace  Hypercalcemia   -sensipar  Debility      Quality-Core Measures   Reviewed items::  Medications reviewed and Labs reviewed  Sue catheter::  No Sue  Central line in place:  Concentrated IV drugs  DVT: xarelto.  Antibiotics:  Treating active infection/contamination beyond 24 hours perioperative coverage

## 2019-05-08 NOTE — [facility]
Infectious Disease Progress Note    Author: Geno Junior M.D. Date & Time of service: 5/8/2019  12:06 PM    Chief Complaint:  Brain abscess, iliopsoas abscess, thrombocytopenia     Interval History:  5/6 Interval 24 hours of Vitals/Labs/Micro,and imaging results reviewed as available. See assessment.   5/8 Interval 24 hours of Vitals/Labs/Micro,and imaging results reviewed as available. See assessment.     Review of Systems:  Review of Systems   Constitutional: Negative for chills and fever.   Gastrointestinal: Positive for constipation. Negative for abdominal pain, diarrhea, nausea and vomiting.   Musculoskeletal: Positive for joint pain and myalgias.       Hemodynamics:  No data recorded.     No Data Recorded           Physical Exam:  Physical Exam   Constitutional: She is oriented to person, place, and time. She appears well-developed and well-nourished.   HENT:   Head: Normocephalic and atraumatic.   Eyes: Pupils are equal, round, and reactive to light. Conjunctivae and EOM are normal.   Cardiovascular: Normal rate, regular rhythm and normal heart sounds.    Pulmonary/Chest: Effort normal and breath sounds normal.   Abdominal: Soft. Bowel sounds are normal. She exhibits no distension. There is no tenderness. There is no rebound and no guarding.   Musculoskeletal:   Right hip with no significant tenderness to palpation, erythema or drainage, mild edema   Neurological: She is alert and oriented to person, place, and time.   Calm and appropriate today   Skin: Skin is warm and dry.   Psychiatric: She has a normal mood and affect. Her behavior is normal.       Meds:  No current facility-administered medications for this encounter.     Labs:  Recent Labs      05/06/19   0410  05/08/19   0316   WBC  3.4*  3.3*   RBC  4.38  4.25   HEMOGLOBIN  11.4*  11.1*   HEMATOCRIT  35.6*  35.1*   MCV  81.3*  82.6   MCH  26.0*  26.1*   RDW  58.4*  60.2*   PLATELETCT  127*  174   MPV  10.8  11.0   NEUTSPOLYS   --   38.70*    LYMPHOCYTES   --   25.60   MONOCYTES   --   24.10*   EOSINOPHILS   --   10.10*   BASOPHILS   --   1.20     Recent Labs      05/08/19   0316   SODIUM  132*   POTASSIUM  4.8   CHLORIDE  103   CO2  22   GLUCOSE  74   BUN  15     Recent Labs      05/08/19   0316   CREATININE  0.63       Imaging:  Ct-abdomen-pelvis With    Result Date: 4/19/2019 4/18/2019 7:00 PM HISTORY/REASON FOR EXAM: Post-op infection, abdomen pelvis, known TECHNIQUE/EXAM DESCRIPTION:  CT abdomen and pelvis with contrast. Sequential axial CT images were obtained from lung bases to the proximal femurs after the uneventful administration of 100 cc Omnipaque 350 intravenous contrast. Low dose optimization technique was utilized for this CT exam including automated exposure control and adjustment of the mA and/or kV according to patient size. COMPARISON:  April 8, 2019 CT ABDOMEN FINDINGS: Linear densities are seen within the lung bases, appearance favors atelectasis. Trace pericardial effusion is noted. The liver is normal in contour. The liver is normal in size. No intrahepatic biliary ductal dilatation is noted. The gallbladder appears within normal limits. The spleen is unremarkable. The pancreas is grossly normal. Bilateral adrenal glands appear within normal limits. The kidneys are unremarkable.  The ureters are normal in caliber along their visualized course. The colon appears within normal limits. There is fluid filled distention of the small bowel is seen. Levoscoliosis of the lumbar spine is seen. Atherosclerotic calcifications are seen. CT PELVIS FINDINGS: The bladder is within normal limits. Postsurgical changes of hysterectomy are noted. Enhancing fluid collection is seen in the right gluteal musculature measuring 2.0 cm. Multiloculated appearing density fluid collection along the right iliacus muscle is  seen measuring approximately 3.4 x 2.0 cm. Enlarged bilateral inguinal lymph nodes are noted. Postsurgical changes of bilateral SI  joints and sacral screw fixation are noted. Bony remodeling of right iliac and sacral fractures is observed.     1.  Enhancing fluid collection in the right gluteal muscle, appearance compatible with small abscess. 2.  Loculated enhancing fluid collection in the right iliac is muscle, corresponding with site of prior drain, appearance compatible with reaccumulation of abscess. 3.  Fluid-filled distention of small bowel suggests ileus 4.  Atherosclerosis 5.  Trace pericardial effusion    Ct-abdomen-pelvis With    Result Date: 4/8/2019 4/8/2019 4:24 PM HISTORY/REASON FOR EXAM: Abdominal infection, abdominal pain Metastatic breast cancer TECHNIQUE/EXAM DESCRIPTION:  CT scan of the abdomen and pelvis with contrast. Contrast-enhanced helical scanning was obtained from the diaphragmatic domes through the pubic symphysis following the bolus administration of nonionic contrast without complication. 100 mL of Omnipaque 350 nonionic contrast was administered without complication. Low dose optimization technique was utilized for this CT exam including automated exposure control and adjustment of the mA and/or kV according to patient size. COMPARISON: CT abdomen and pelvis 4/2/2019 FINDINGS: Lung bases: The lung bases are clear. Bilateral breast implants are present. Osseous structures:  Surgical screw traverses both sacroiliac joint. There is fracture of the right ilium. There appears to be destructive change of the right medial sacrum. This is most consistent with known metastatic disease. Abdomen: The liver is enlarged without focal abnormality. There is no biliary dilation. The spleen is normal in appearance. The pancreas is normal in appearance. The splenic vein and portal vein enhance normally. Both adrenal glands are normal in appearance. The right kidney is normal in appearance. The left kidney is normal in appearance. There is no hydronephrosis. Bowel and mesentery: Within normal limits. The aorta shows  atherosclerosis without aneurysm. The terminal ileum is normal in appearance. The appendix is not identified. Pelvis: There is a drainage catheter within or just anterior to the right iliacus muscle. There is no residual associated fluid. Right gluteal drainage catheter has been removed.     1.  Resolution of right iliacus abscess with no associated fluid adjacent to the drainage catheter 2.  Interval removal of right gluteal drainage catheter 3.  Surgical screw traversing both sacroiliac joint with associated pathologic fracture of the right ilium and right medial sacrum 4.  Hepatomegaly    Ct-chest,abdomen,pelvis With    Result Date: 4/23/2019 4/23/2019 8:57 PM HISTORY/REASON FOR EXAM: Breast cancer. Shortness of breath. TECHNIQUE/EXAM DESCRIPTION: CT scan of the chest, abdomen and pelvis with contrast. Thin-section helical scanning was obtained with intravenous contrast from the lung apices through the pubic symphysis to include the chest, abdomen and pelvis. 80 mL of Omnipaque 350 nonionic contrast was administered intravenously without complication. Low dose optimization technique was utilized for this CT exam including automated exposure control and adjustment of the mA and/or kV according to patient size. COMPARISON: None Available FINDINGS: The entire study is degraded by patient respiratory motion artifact. CT CHEST: There is limited evaluation of the lungs due to extensive patient respiratory motion artifact. There is a linear atelectasis with groundglass opacity bilaterally. There are bilateral breast implants. There are bilateral axillary lymph nodes seen which measure up to 13 mm in short axis dimension bilaterally. There are surgical clips within the axilla. There are small bilateral thyroid nodules. There is no evidence of pericardial effusion. There is moderate atherosclerotic change of the aorta or coronary vessels. CT ABDOMEN: There is fatty change of the liver. The spleen is enlarged. There is  dense material layering dependently within the gallbladder consistent with gallstones versus vicarious excretion of contrast. The spleen is normal. The kidneys are normal. The adrenal glands are normal. The pancreas is normal. There are retroperitoneal lymph nodes which measure less than 10 mm in short axis dimension. Similar sized periportal and portacaval lymph nodes are seen as well. CT PELVIS: The appendix is not identified. There is moderate constipation. There is no evidence of bowel obstruction or inflammatory change. There is no evidence of free fluid. There screws extending through the sacrum bilaterally. There is surrounding lucency and fragmentation with fracture extending into the right ilium. There is some fluid seen within the right gluteal and iliac is muscles. The right gluteal fluid collection  measures 18 mm in size and is not significantly changed. The fluid collection within the right iliacis muscle has increased in size now measures 2.7 cm.     1.  Limited evaluation of the thorax due to extensive patient respiratory motion artifact. 2.  Borderline enlarged bilateral axillary lymph nodes. 3.  Linear atelectasis within the lungs with patchy groundglass opacity bilaterally. 4.  Again seen screws extending through the sacrum bilaterally. There is surrounding lucency and fragmentation of the sacrum as well as the right greater than left ilium and a right iliac fracture. It is uncertain if these represent metastatic lesions with pathologic fracture versus insufficiency fractures. 5.  Fluid collections within the right gluteal and iliacis muscles.. The collection within the right gluteal muscle has unchanged while the fluid collection within the right iliacis muscle is increased somewhat in size. Differential diagnosis includes hematoma as well as abscess. 6.  Enlarged retroperitoneal, periportal and portacaval lymph nodes.    Ct-head With & W/o    Result Date: 4/13/2019 4/13/2019 4:12 PM  HISTORY/REASON FOR EXAM:  History of septic emboli, follow-up. TECHNIQUE/EXAM DESCRIPTION AND NUMBER OF VIEWS: CT scan of the head without and with contrast. The study was performed on a helical multidetector CT scanner. Contiguous axial sections were obtained from the skull base through the vertex both prior to and after the administration of IV contrast. 100 mL of Omnipaque 350 nonionic contrast was injected intravenously. Up to date radiation dose reduction adjustments have been utilized to meet ALARA standards for radiation dose reduction. COMPARISON:  CT dated 3/30/2019, 3/17/2019, MR dated 3/7/2019 FINDINGS: There are persistent small hyperenhancing lesions scattered throughout the brain. Largest lesion in the left basal ganglia measures 4.6 mm and demonstrates mild associated edema. Largest lesion in the right cerebral hemisphere measures 4 mm, also with mild associated edema. Several enhancing lesions are also seen in the brainstem. All of the lesions appear stable since prior. No new or enlarging lesions are detected The calvariae and skull base are unremarkable. There are no extraaxial fluid collections. The ventricular system and basal cisterns are within normal limits. There are no hemorrhagic lesions. The paranasal sinuses and mastoids in the field of view are unremarkable. Mild atrophy and chronic small vessel ischemic changes.     1.  No change in scattered hyperenhancing lesions throughout the brain, cerebellum and visualized brainstem. Some of them demonstrate mild associated edema, similar to prior study. 2.  No CT evidence of infarct or hemorrhage.    Dx-chest-limited (1 View)    Result Date: 4/22/2019 4/22/2019 8:40 PM HISTORY/REASON FOR EXAM:  Shortness of Breath TECHNIQUE/EXAM DESCRIPTION AND NUMBER OF VIEWS: Single portable view of the chest. COMPARISON: 4/16/2019 FINDINGS: HEART: Stable size. LUNGS: No areas of air space disease are demonstrated. PLEURA: No effusion or pneumothorax.     No  acute cardiac or pulmonary abnormalities are identified. Lung volumes are low.    Dx-chest-portable (1 View)    Result Date: 4/30/2019 4/30/2019 4:19 PM HISTORY/REASON FOR EXAM:  PICC line placement. IV access necessity. TECHNIQUE/EXAM DESCRIPTION AND NUMBER OF VIEWS: Single portable view of the chest. COMPARISON:  4/22/2019 FINDINGS: A right arm PICC line is present in satisfactory position with its tip projecting over the SVC at the level of the jennifer. The cardiac silhouette is within normal limits. No infiltrates or consolidations are noted. No pleural effusion is noted.     1.  Right arm PICC line tip projects in satisfactory position. 2.  Clear chest.    Dx-chest-portable (1 View)    Result Date: 4/16/2019 4/16/2019 5:43 AM HISTORY/REASON FOR EXAM: Shortness of Breath TECHNIQUE/EXAM DESCRIPTION:  Single AP view of the chest. COMPARISON: March 4, 2019 FINDINGS: Cardiomegaly is observed. The mediastinal contour appears within normal limits.  The central  pulmonary vasculature appears prominent and indistinct. Bilateral lung volumes are diminished.  Diffuse scattered hazy pulmonary parenchymal opacities are seen. No significant pleural effusions are identified. The bony structures appear age-appropriate.     1.  Mild pulmonary edema and/or infiltrate 2.  Cardiomegaly    Mr-brain-with & W/o    Result Date: 4/24/2019 4/24/2019 3:42 PM HISTORY/REASON FOR EXAM:  eval change of brain abscesses. TECHNIQUE/EXAM DESCRIPTION:   MRI of the brain without and with contrast. T1 sagittal, T2 fast spin-echo axial, T1 coronal, FLAIR coronal, diffusion-weighted and apparent diffusion coefficient (ADC map) axial images were obtained of the whole brain. T1 postcontrast axial and T1 postcontrast coronal images were obtained. The study was performed on a [employer] Signa 1.5 Miranda MRI scanner. 6 mL Gadavist contrast was administered intravenously. COMPARISON:  3/7/2090 FINDINGS:     The post gadolinium sequences demonstrates  multifocal areas of nodular enhancement seen throughout the supra and infratentorial brain parenchyma. Some of the lesions demonstrates surrounding T2 hyperintensity. There is no acute infarct. There is no acute or chronic parenchymal hemorrhage. There is no hydrocephalus. Mild cerebral volume loss is seen. There is no extra-axial fluid collection, hemorrhage or mass. The vertebrobasilar system is diminutive in caliber. There are fetal origins of bilateral posterior cerebral arteries. There is no large lesion identified in the expected course of the intracranial portions of the cranial nerves. The skull bones are unremarkable. The paranasal sinuses are clear. The extracranial soft tissue including orbits appear grossly normal.     Multiple punctate enhancing lesions seen throughout the supra and infratentorial brain parenchyma. When compared with the previous MRI there has been interval decrease in the size of the lesions. There are also interval reduction in the extent of the surrounding white matter edema in the restricted diffusion consistent with improvement/treatment response of the most of the multifocal brain abscesses. However there are new enhancing lesions in the left basal ganglia and right cerebellum consistent with interval new abscesses.Follow-up is recommended to ensure the complete resolution of the enhancing lesions. Please note that the radiological differential diagnosis of this multifocal lesions still includes metastasis. However decrease in the size of the most of the lesions favors the diagnosis of infection.     Ct-cta Chest Pulmonary Artery W/ Recons    Result Date: 4/16/2019 4/16/2019 6:13 AM HISTORY/REASON FOR EXAM:  Shortness of breath TECHNIQUE/EXAM DESCRIPTION:  CT angiogram of the chest with contrast. Transaxial MDCT scan of chest post bolus 55 cc Omnipaque 350 IV administration. MIP reconstructed images were created and reviewed. Low dose optimization technique was utilized for this  CT exam including automated exposure control and adjustment of the mA and/or kV according to patient size. COMPARISON:  March 5, 2019 FINDINGS: The visualized portion of the thyroid appear within normal limits.  The trachea and main stem airways are normal in caliber. There are no pathologically enlarged mediastinal lymph nodes. Trace pericardial effusion is seen, otherwise the heart and pericardium appear within normal limits.  Atherosclerotic calcifications are identified.  The pulmonary arteries are well opacified, no large central pulmonary arterial filling defect is appreciated, respiratory motion artifacts limit evaluation of the subsegmental pulmonary arterial branches. No pulmonary consolidation is identified. 7.7 mm medial pleural-based pulmonary nodule is seen on image 140. 6.5 mm pulmonary nodule is seen in the right midlung, best visualized on coronal imaging. Calcified granuloma in the left apex is seen. Limited views of the abdomen demonstrate enlargement of the liver. The bony structures are age appropriate.     1.  No large central pulmonary embolus is appreciated, evaluation of the subsegmental branches is essentially nondiagnostic due to motion artifacts. Additional imaging would be required for definitive exclusion of small distal pulmonary emboli. 2.  Trace pericardial effusion 3.  Hepatomegaly 4.  Atherosclerosis. 5.  Right pulmonary nodules, the largest is a 7.7 mm pulmonary nodule, see nodule follow-up recommendations below. Low Risk: CT at 3-6 months, then consider CT at 18-24 months High Risk: CT at 3-6 months, then at 18-24 months Comments: Use most suspicious nodule as guide to management. Follow-up intervals may vary according to size and risk. Low Risk - Minimal or absent history of smoking and of other known risk factors. High Risk - History of smoking or of other known risk factors. Note: These recommendations do not apply to lung cancer screening, patients with immunosuppression, or  patients with known primary cancer. Fleischner Society 2017 Guidelines for Management of Incidentally Detected Pulmonary Nodules in Adults     Ir-picc Line Placement W/ Guidance > Age 5    Result Date: 4/29/2019  HISTORY/REASON FOR EXAM:   PICC placement. TECHNIQUE/EXAM DESCRIPTION AND NUMBER OF VIEWS:   PICC line insertion with ultrasound guidance.  The procedure was performed using maximal sterile barrier technique including sterile gown, mask, cap, and donning of sterile gloves following appropriate hand hygiene and/or sterile scrub. Patient skin site was prepped with 2% Chlorhexidine solution. FINDINGS:  PICC line insertion with Ultrasound Guidance was performed by qualified nursing staff without the assistance of a Radiologist. PICC positioning appropriateness confirmed by 3CG technology; chest xray only needed in the instance 3CG unable to confirm placement.              Ultrasound-guided PICC placement performed by qualified nursing staff as above.       Micro:  Results     Procedure Component Value Units Date/Time    MRSA BY PCR (AMP) [[id number]] Collected:  04/30/19 1545    Order Status:  Completed Specimen:  Respirate Updated:  05/01/19 [id number]     Significant Indicator NEG     Source RESP     Site -     MRSA PCR Negative for MRSA by PCR.           ASSESSMENT/PLAN:      Muriel Martini is a 68 y.o.  admitted 4/30/2019. Pt has a past medical history of diabetes, SANTOSH of right hip with hardware in place and breast cancer who was originally admitted to [facility] on 03/08/2019 as a transfer from an outside hospital for right hip and pelvic pain.    Work-up revealed a right iliopsoas lesion and given her history of breast  cancer, there was concern for metastatic malignancy.  She transferred to a skilled nursing facility but there developed fever up to 104 and encephalopathy.  Due to new onset confusion she had an MRI of the brain which showed multiple regions and she was again transferred to [facility]  for higher level of care.      She was worked up at [facility] and seen by ID.  Per ID notes the differential for the multiple brain lesions was abscess versus metastatic.  Lesions were too small to biopsy per neurosurgery.  TTE and CHAS negative.  Blood cultures negative.  Imaging on 3/27 showed iliopsoas and gluteal fluid collections and drain was placed on 3/29.  Cultures were all negative.  Another drain was placed on 4/4 with purulent fluid recovered.  The abscesses appear to have resolved on CT 4/8.  However repeat CT on 4/19 revealed right gluteal muscle abscess in addition to recurrent abscess in the right iliac muscle.  The abscesses were too small for drainage.  All cultures have been negative, however she had been on antibiotics.  She was initially on vancomycin cefepime and Flagyl.  The cefepime was changed to meropenem per notes on 3/26 due to concern it was causing confusion.  However per subsequent notes she continued to encephalopathy/delirium with a have waxing and waning pattern.  CT on 4/23 with unchanged fluid collection in the right gluteal muscle and somewhat increased fluid collection in the right iliac is muscle.  MRI on 4/23 with improvement of the most prominent multifocal brain abscesses.  However there are new enhancing lesions in the left basal ganglia and right cerebellum consistent with new abscess.  ID signed off on 4/27 with plan to continue vancomycin and meropenem through 5/20 and then repeat MRI.     She is subsequently transferred to [facility] to complete her IV antibiotics.  She had been doing well at [facility] until recently when she did began to develop increased thrombocytopenia.  Primary team is concerned that this is due to meropenem and agree consulted ID for antibiotic management.       Interval 24 hour assessment:    AF, O2 RA,    Labs reviewed    Pt continued on vancomycin, cefepime and Flagyl.  She denying any hip or back pain today.  She was calm, oriented and answers questions  "appropriately.        Left hip pain, new   -Yesterday she was complaining of pain in both hips -unclear etiology as her prior fracture and infection is on the right side.      Brain abscesses  -Original abscesses appear to have improved on therapy.  Per 4/23 MRI: \"supra and infratentorial brain parenchyma. When compared with the previous MRI there has been interval decrease in the size of the lesions. There are also interval reduction in the extent of the surrounding white matter edema in the restricted diffusion consistent with improvement/treatment response of the most of the multifocal brain abscesses. However, there are new enhancing lesions in the left basal ganglia and right cerebellum consistent with interval new abscesses.\"   - This raises concern for lack of source control versus potential metastatic lesions     Gluteal and iliopsoas abscess  - Adjacent to hardware in right hip  - CT on 4/23: \"Fluid collections within the right gluteal and iliacis muscles.. The collection within the right gluteal muscle has unchanged while the fluid collection within the right iliacis muscle is increased somewhat in size.\"      Prior right sacral fracture, hardware in place  - Transsacral screw fixation, right sacral fracture, S1 and S2 on 12/17/18   - Hardware a potential  likely source of infection, per notes ID spoke with orthopedics and they feel the hardware needs to be in for at least 6 months     Encephalopathy-improved  -Appears to have been present to some degree since admission, likely multifactorial, delirium and due to her brain abscess, cefepime may have contributed but unclear as she continued to have altered mentation after transition to meropenem     Leukopenia, chronic, stable      Thrombocytopenia- improved   -Appears to been ongoing with variable lab results since 3/28, slow recent trend down but not significantly lower from prior levels   -Concern this is subsequent to her meropenem, transition to " cefepime on 5/4     Type 2 diabetes   -Control blood sugars to aid in infection resolution     H/o breast cancer  -Ongoing concern that the lesions in her brain are metastatic in nature     Plan:     --- Continue cefepime 2 g every 8 and Flagyl 5 mg 3 times daily, continue Vanco  --- Monitor cell levels  --- Monitor mental status to see if any re-introducing cefepime causes significant changes in her baseline mental status-Per notes she has had waxing and waning encephalopathy during entire admission  --- If there is concern that the cefepime is causing confusion would recommend repeating MRI brain as well as CT abdomen and pelvis sooner to see if antibiotics can be stopped, otherwise continue antibiotics with repeat imaging on 5/20/2019 and then antibiotic duration to be determined based on imaging           Plan of care discussed with nurse.  Will continue to follow.     Geno Junior M.D.

## 2019-05-09 LAB — VANCOMYCIN TROUGH SERPL-MCNC: 19.8 UG/ML (ref 10–20)

## 2019-05-09 PROCEDURE — 80202 ASSAY OF VANCOMYCIN: CPT

## 2019-05-09 ASSESSMENT — ENCOUNTER SYMPTOMS
COUGH: 0
SORE THROAT: 0
FEVER: 0
VOMITING: 0
SHORTNESS OF BREATH: 0
ABDOMINAL PAIN: 0
NAUSEA: 0

## 2019-05-09 NOTE — [facility]
Hospital Medicine Progress Note, Adult, Complex               Author: Elvira GARDENIA Gilberten Date & Time created: 5/9/2019  7:01 AM     CC: brain abscesses    Interval History:  Denies pain this am  Eating better  Ambulated 22 feet with therapy    Review of Systems:  Review of Systems   Constitutional: Negative for fever.   HENT: Negative for sore throat.    Respiratory: Negative for cough and shortness of breath.    Cardiovascular: Negative for chest pain.   Gastrointestinal: Negative for abdominal pain, nausea and vomiting.   Musculoskeletal: Negative for joint pain.       T [id number] RR 18 SaO2 92% I/O 2.4/inc  Physical Exam   Constitutional: She appears well-developed. No distress.   HENT:   Head: Normocephalic and atraumatic.   Eyes: Conjunctivae are normal. Right eye exhibits no discharge. Left eye exhibits no discharge. No scleral icterus.   Neck: No tracheal deviation present.   Cardiovascular: Normal rate, regular rhythm and intact distal pulses.    Pulmonary/Chest: Effort normal. No respiratory distress. She has no wheezes. She exhibits no tenderness.   Abdominal: Soft. Bowel sounds are normal. She exhibits no distension. There is no tenderness.   Musculoskeletal: She exhibits no edema.   Neurological:   Mild unchanged slurred speech  HERNANDEZ     Skin: Skin is warm.   Vitals reviewed.      Labs:          Recent Labs      05/08/19   0316   SODIUM  132*   POTASSIUM  4.8   CHLORIDE  103   CO2  22   BUN  15   CREATININE  0.63   CALCIUM  9.8     Recent Labs      05/08/19   0316   GLUCOSE  74     Recent Labs      05/08/19   0316   RBC  4.25   HEMOGLOBIN  11.1*   HEMATOCRIT  35.1*   PLATELETCT  174     Recent Labs      05/08/19   0316   WBC  3.3*   NEUTSPOLYS  38.70*   LYMPHOCYTES  25.60   MONOCYTES  24.10*   EOSINOPHILS  10.10*   BASOPHILS  1.20          GI/Nutrition:  Orders Placed This Encounter   Procedures   • Diet Order Diabetic     Standing Status:   Standing     Number of Occurrences:   1     Order Specific  Question:   Diet:     Answer:   Diabetic [3]     Order Specific Question:   Texture/Fiber modifications:     Answer:   Dysphagia 3(Mechanical Soft)specify fluid consistency(question 6) [3]     Order Specific Question:   Consistency/Fluid modifications:     Answer:   Thin Liquids [3]     Medical Decision Making, by Problem:  Brain abscesses v metastatic disease   -Vanco, cefepime, flagyl 5/25   -repeat MRI 5/20   -MRI 4/24 with some decrease in size and areas of new abscess  R gluteal/iliac abscess   -still present and slightly enlarged on last CT   -defer to ID  H/o breast cancer with reconstruction  DM2  Metabolic encephalopathy   -dc seroquel   -follow    -improved  RLS   -mirapex  H/o DVT   -xarelto  Thrombocytopenia-resolved  Leukopenia   -monitor ANC. F/u am  R lung pulm nodule   -outpatient f/u  HTN   -norvasc, cozaar  HLD   -lipitor  GERD  Hyponatremia  Hypercalcemia   -sensipar  Debility    DC kcl    Quality-Core Measures   Reviewed items::  Medications reviewed  Sue catheter::  No Sue  Central line in place:  Concentrated IV drugs  DVT: xarelto.  Antibiotics:  Treating active infection/contamination beyond 24 hours perioperative coverage

## 2019-05-10 LAB
ANION GAP SERPL CALC-SCNC: 7 MMOL/L (ref 0–11.9)
BASOPHILS # BLD AUTO: 1 % (ref 0–1.8)
BASOPHILS # BLD: 0.03 K/UL (ref 0–0.12)
BUN SERPL-MCNC: 16 MG/DL (ref 8–22)
CALCIUM SERPL-MCNC: 9.4 MG/DL (ref 8.4–10.2)
CHLORIDE SERPL-SCNC: 100 MMOL/L (ref 96–112)
CO2 SERPL-SCNC: 21 MMOL/L (ref 20–33)
CREAT SERPL-MCNC: 0.5 MG/DL (ref 0.5–1.4)
EOSINOPHIL # BLD AUTO: 0.32 K/UL (ref 0–0.51)
EOSINOPHIL NFR BLD: 10.6 % (ref 0–6.9)
ERYTHROCYTE [DISTWIDTH] IN BLOOD BY AUTOMATED COUNT: 57.5 FL (ref 35.9–50)
GLUCOSE SERPL-MCNC: 82 MG/DL (ref 65–99)
HCT VFR BLD AUTO: 34.6 % (ref 37–47)
HGB BLD-MCNC: 11.2 G/DL (ref 12–16)
IMM GRANULOCYTES # BLD AUTO: 0.01 K/UL (ref 0–0.11)
IMM GRANULOCYTES NFR BLD AUTO: 0.3 % (ref 0–0.9)
LYMPHOCYTES # BLD AUTO: 0.92 K/UL (ref 1–4.8)
LYMPHOCYTES NFR BLD: 30.5 % (ref 22–41)
MCH RBC QN AUTO: 26.3 PG (ref 27–33)
MCHC RBC AUTO-ENTMCNC: 32.4 G/DL (ref 33.6–35)
MCV RBC AUTO: 81.2 FL (ref 81.4–97.8)
MONOCYTES # BLD AUTO: 0.77 K/UL (ref 0–0.85)
MONOCYTES NFR BLD AUTO: 25.5 % (ref 0–13.4)
NEUTROPHILS # BLD AUTO: 0.97 K/UL (ref 2–7.15)
NEUTROPHILS NFR BLD: 32.1 % (ref 44–72)
NRBC # BLD AUTO: 0 K/UL
NRBC BLD-RTO: 0 /100 WBC
PLATELET # BLD AUTO: 184 K/UL (ref 164–446)
PMV BLD AUTO: 10 FL (ref 9–12.9)
POTASSIUM SERPL-SCNC: 3.5 MMOL/L (ref 3.6–5.5)
RBC # BLD AUTO: 4.26 M/UL (ref 4.2–5.4)
SODIUM SERPL-SCNC: 128 MMOL/L (ref 135–145)
WBC # BLD AUTO: 3 K/UL (ref 4.8–10.8)

## 2019-05-10 PROCEDURE — 85025 COMPLETE CBC W/AUTO DIFF WBC: CPT

## 2019-05-10 PROCEDURE — 80048 BASIC METABOLIC PNL TOTAL CA: CPT

## 2019-05-10 ASSESSMENT — ENCOUNTER SYMPTOMS
COUGH: 0
MYALGIAS: 1
FEVER: 0
CHILLS: 0
SORE THROAT: 0
SHORTNESS OF BREATH: 0
CONSTIPATION: 1
NAUSEA: 0
VOMITING: 0
ABDOMINAL PAIN: 0
DIARRHEA: 0

## 2019-05-10 NOTE — CONSULTS
DATE OF SERVICE:  04/04/2019    REQUESTING PHYSICIAN:  This patient is seen at the request of Dr. Keller.    HISTORY OF PRESENT ILLNESS:  This is a 68-year-old female well known to me who   has a long history of multiple medical issues, who presented with a pelvic   abscess and I was consulted for management and taking care of her pelvis in   the past for surgical intervention.  She denies numbness, paresthesias, loss   of consciousness or trauma.    PAST MEDICAL HISTORY:  Significant for COPD, encephalopathy, diabetes, and   hypertension.    ALLERGIES:  No known drug allergies.    PAST SURGICAL HISTORY:  Sacroiliac screw and bilateral mastectomies.    MEDICATIONS:  Amlodipine, hydralazine, labetalol, loperamide, Seroquel,   Glucophage, Cozaar, Neurontin, and pravastatin.    REVIEW OF SYSTEMS:  Positive for pelvic pain.  All other systems reviewed and   found to be negative.    PHYSICAL EXAMINATION:  VITAL SIGNS:  Temperature is 96, pulse 76, respirations 16.  GENERAL:  This is a well-developed, well-nourished female, in no acute   distress.  MENTAL STATUS:  The patient is alert and oriented x3.  GCS of 15.  HEENT:  Head is normocephalic, atraumatic.  Eyes, anicteric.  Pupils are   equal, round, reactive to light and accommodation.  Oropharynx is clear.    Mucosa moist.  NECK:  Supple, nontender, no masses.  PULMONARY:  The patient is breathing spontaneously, is unlabored.  LUNGS:  Clear to auscultation.  HEART:  Regular rate and rhythm.  No murmurs, rubs or gallops.  ABDOMEN:  Soft, nontender, nondistended.  PELVIS:  Stable to AP and lateral compression.  EXTREMITIES:  Examination of the bilateral lower extremities demonstrates   equal leg lengths, full range of motion bilaterally.    DIAGNOSTIC DATA:  CT scan was reviewed that demonstrates a 2x2 iliopsoas   abscess.    ASSESSMENT:  Iliopsoas abscess.    PLAN:  At this point, her iliopsoas abscess is being drained by interventional   radiology.  It does not need  surgical intervention.  We were consulted for   whether or not her hardware should be removed, but ideally this was placed in   December and likely leave this in for 6 months prior to screw removal.  The   patient was prescribed antibiotics for the next 2 months, weightbear as   tolerated, and have her follow up at that point if she is still sore or ____   infection, we will consider transsacral screw removal.       ____________________________________     BART NORMAN MD [facility] / [facility]    DD:  05/10/2019 09:57:00  DT:  05/10/2019 10:57:14    D#:  [id number]  Job#:  [id number]

## 2019-05-10 NOTE — [facility]
Infectious Disease Progress Note    Author: Karen Garcia M.D. Date & Time of service: 5/10/2019  12:04 PM    Chief Complaint:  Brain abscess, iliopsoas abscess, leukopenia     Interval History:   68 y.o. female admitted 4/30/2019. 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  Work-up revealed a right iliopsoas lesion, gluteal abscess, and mult brain abscesses  5/6 Interval 24 hours of Vitals/Labs/Micro,and imaging results reviewed as available. See assessment.   5/8 Interval 24 hours of Vitals/Labs/Micro,and imaging results reviewed as available. See assessment.   5/10 AF WBC 3 continued c/o constipation denies HA, visual changes, neuro deficits    Review of Systems:  Review of Systems   Constitutional: Negative for chills and fever.   Cardiovascular: Negative for chest pain.   Gastrointestinal: Positive for constipation. Negative for abdominal pain, diarrhea, nausea and vomiting.   Musculoskeletal: Positive for joint pain and myalgias.   Skin: Negative for rash.   Limited    Hemodynamics:  T [id number]/78 RR 18 SaO2 92%    Physical Exam:  Physical Exam   Constitutional: No distress.   HENT:   Head: Normocephalic and atraumatic.   Mouth/Throat: No oropharyngeal exudate.   Eyes: Pupils are equal, round, and reactive to light. EOM are normal. No scleral icterus.   Neck: Neck supple. No JVD present.   Cardiovascular: Normal rate and regular rhythm.    Pulmonary/Chest: Effort normal. No stridor. No respiratory distress.   Abdominal: Soft. Bowel sounds are normal. She exhibits no distension. There is no tenderness. There is no rebound.   Musculoskeletal: She exhibits no edema.   Right hip with no erythema or tenderness  PICC nontender   Neurological: She is alert.   Oriented to person and place   Skin: Skin is warm and dry.  She is not diaphoretic.   Psychiatric: She has a normal mood and affect. Her behavior is normal.   Vitals reviewed.    Labs:  Recent Labs      05/08/19   0316  05/10/19   0400   WBC  3.3*  3.0*   RBC  4.25  4.26   HEMOGLOBIN  11.1*  11.2*   HEMATOCRIT  35.1*  34.6*   MCV  82.6  81.2*   MCH  26.1*  26.3*   RDW  60.2*  57.5*   PLATELETCT  174  184   MPV  11.0  10.0   NEUTSPOLYS  38.70*  32.10*   LYMPHOCYTES  25.60  30.50   MONOCYTES  24.10*  25.50*   EOSINOPHILS  10.10*  10.60*   BASOPHILS  1.20  1.00     Recent Labs      05/08/19   0316  05/10/19   0400   SODIUM  132*  128*   POTASSIUM  4.8  3.5*   CHLORIDE  103  100   CO2  22  21   GLUCOSE  74  82   BUN  15  16     Recent Labs      05/08/19   0316  05/10/19   0400   CREATININE  0.63  0.50       Imaging:  Ct-abdomen-pelvis With    Result Date: 4/19/2019 4/18/2019 7:00 PM HISTORY/REASON FOR EXAM: Post-op infection, abdomen pelvis, known TECHNIQUE/EXAM DESCRIPTION:  CT abdomen and pelvis with contrast. Sequential axial CT images were obtained from lung bases to the proximal femurs after the uneventful administration of 100 cc Omnipaque 350 intravenous contrast. Low dose optimization technique was utilized for this CT exam including automated exposure control and adjustment of the mA and/or kV according to patient size. COMPARISON:  April 8, 2019 CT ABDOMEN FINDINGS: Linear densities are seen within the lung bases, appearance favors atelectasis. Trace pericardial effusion is noted. The liver is normal in contour. The liver is normal in size. No intrahepatic biliary ductal dilatation is noted. The gallbladder appears within normal limits. The spleen is unremarkable. The pancreas is grossly normal. Bilateral adrenal glands appear within normal limits. The kidneys are unremarkable.  The ureters are normal in caliber along their visualized course. The colon appears within normal limits. There is fluid filled distention of the small bowel is seen. Levoscoliosis of the  lumbar spine is seen. Atherosclerotic calcifications are seen. CT PELVIS FINDINGS: The bladder is within normal limits. Postsurgical changes of hysterectomy are noted. Enhancing fluid collection is seen in the right gluteal musculature measuring 2.0 cm. Multiloculated appearing density fluid collection along the right iliacus muscle is  seen measuring approximately 3.4 x 2.0 cm. Enlarged bilateral inguinal lymph nodes are noted. Postsurgical changes of bilateral SI joints and sacral screw fixation are noted. Bony remodeling of right iliac and sacral fractures is observed.     1.  Enhancing fluid collection in the right gluteal muscle, appearance compatible with small abscess. 2.  Loculated enhancing fluid collection in the right iliac is muscle, corresponding with site of prior drain, appearance compatible with reaccumulation of abscess. 3.  Fluid-filled distention of small bowel suggests ileus 4.  Atherosclerosis 5.  Trace pericardial effusion    Ct-abdomen-pelvis With    Result Date: 4/8/2019 4/8/2019 4:24 PM HISTORY/REASON FOR EXAM: Abdominal infection, abdominal pain Metastatic breast cancer TECHNIQUE/EXAM DESCRIPTION:  CT scan of the abdomen and pelvis with contrast. Contrast-enhanced helical scanning was obtained from the diaphragmatic domes through the pubic symphysis following the bolus administration of nonionic contrast without complication. 100 mL of Omnipaque 350 nonionic contrast was administered without complication. Low dose optimization technique was utilized for this CT exam including automated exposure control and adjustment of the mA and/or kV according to patient size. COMPARISON: CT abdomen and pelvis 4/2/2019 FINDINGS: Lung bases: The lung bases are clear. Bilateral breast implants are present. Osseous structures:  Surgical screw traverses both sacroiliac joint. There is fracture of the right ilium. There appears to be destructive change of the right medial sacrum. This is most consistent with  known metastatic disease. Abdomen: The liver is enlarged without focal abnormality. There is no biliary dilation. The spleen is normal in appearance. The pancreas is normal in appearance. The splenic vein and portal vein enhance normally. Both adrenal glands are normal in appearance. The right kidney is normal in appearance. The left kidney is normal in appearance. There is no hydronephrosis. Bowel and mesentery: Within normal limits. The aorta shows atherosclerosis without aneurysm. The terminal ileum is normal in appearance. The appendix is not identified. Pelvis: There is a drainage catheter within or just anterior to the right iliacus muscle. There is no residual associated fluid. Right gluteal drainage catheter has been removed.     1.  Resolution of right iliacus abscess with no associated fluid adjacent to the drainage catheter 2.  Interval removal of right gluteal drainage catheter 3.  Surgical screw traversing both sacroiliac joint with associated pathologic fracture of the right ilium and right medial sacrum 4.  Hepatomegaly    Ct-chest,abdomen,pelvis With    Result Date: 4/23/2019 4/23/2019 8:57 PM HISTORY/REASON FOR EXAM: Breast cancer. Shortness of breath. TECHNIQUE/EXAM DESCRIPTION: CT scan of the chest, abdomen and pelvis with contrast. Thin-section helical scanning was obtained with intravenous contrast from the lung apices through the pubic symphysis to include the chest, abdomen and pelvis. 80 mL of Omnipaque 350 nonionic contrast was administered intravenously without complication. Low dose optimization technique was utilized for this CT exam including automated exposure control and adjustment of the mA and/or kV according to patient size. COMPARISON: None Available FINDINGS: The entire study is degraded by patient respiratory motion artifact. CT CHEST: There is limited evaluation of the lungs due to extensive patient respiratory motion artifact. There is a linear atelectasis with groundglass  opacity bilaterally. There are bilateral breast implants. There are bilateral axillary lymph nodes seen which measure up to 13 mm in short axis dimension bilaterally. There are surgical clips within the axilla. There are small bilateral thyroid nodules. There is no evidence of pericardial effusion. There is moderate atherosclerotic change of the aorta or coronary vessels. CT ABDOMEN: There is fatty change of the liver. The spleen is enlarged. There is dense material layering dependently within the gallbladder consistent with gallstones versus vicarious excretion of contrast. The spleen is normal. The kidneys are normal. The adrenal glands are normal. The pancreas is normal. There are retroperitoneal lymph nodes which measure less than 10 mm in short axis dimension. Similar sized periportal and portacaval lymph nodes are seen as well. CT PELVIS: The appendix is not identified. There is moderate constipation. There is no evidence of bowel obstruction or inflammatory change. There is no evidence of free fluid. There screws extending through the sacrum bilaterally. There is surrounding lucency and fragmentation with fracture extending into the right ilium. There is some fluid seen within the right gluteal and iliac is muscles. The right gluteal fluid collection  measures 18 mm in size and is not significantly changed. The fluid collection within the right iliacis muscle has increased in size now measures 2.7 cm.     1.  Limited evaluation of the thorax due to extensive patient respiratory motion artifact. 2.  Borderline enlarged bilateral axillary lymph nodes. 3.  Linear atelectasis within the lungs with patchy groundglass opacity bilaterally. 4.  Again seen screws extending through the sacrum bilaterally. There is surrounding lucency and fragmentation of the sacrum as well as the right greater than left ilium and a right iliac fracture. It is uncertain if these represent metastatic lesions with pathologic fracture  versus insufficiency fractures. 5.  Fluid collections within the right gluteal and iliacis muscles.. The collection within the right gluteal muscle has unchanged while the fluid collection within the right iliacis muscle is increased somewhat in size. Differential diagnosis includes hematoma as well as abscess. 6.  Enlarged retroperitoneal, periportal and portacaval lymph nodes.    Ct-head With & W/o    Result Date: 4/13/2019 4/13/2019 4:12 PM HISTORY/REASON FOR EXAM:  History of septic emboli, follow-up. TECHNIQUE/EXAM DESCRIPTION AND NUMBER OF VIEWS: CT scan of the head without and with contrast. The study was performed on a helical multidetector CT scanner. Contiguous axial sections were obtained from the skull base through the vertex both prior to and after the administration of IV contrast. 100 mL of Omnipaque 350 nonionic contrast was injected intravenously. Up to date radiation dose reduction adjustments have been utilized to meet ALARA standards for radiation dose reduction. COMPARISON:  CT dated 3/30/2019, 3/17/2019, MR dated 3/7/2019 FINDINGS: There are persistent small hyperenhancing lesions scattered throughout the brain. Largest lesion in the left basal ganglia measures 4.6 mm and demonstrates mild associated edema. Largest lesion in the right cerebral hemisphere measures 4 mm, also with mild associated edema. Several enhancing lesions are also seen in the brainstem. All of the lesions appear stable since prior. No new or enlarging lesions are detected The calvariae and skull base are unremarkable. There are no extraaxial fluid collections. The ventricular system and basal cisterns are within normal limits. There are no hemorrhagic lesions. The paranasal sinuses and mastoids in the field of view are unremarkable. Mild atrophy and chronic small vessel ischemic changes.     1.  No change in scattered hyperenhancing lesions throughout the brain, cerebellum and visualized brainstem. Some of them  demonstrate mild associated edema, similar to prior study. 2.  No CT evidence of infarct or hemorrhage.    Dx-chest-limited (1 View)    Result Date: 4/22/2019 4/22/2019 8:40 PM HISTORY/REASON FOR EXAM:  Shortness of Breath TECHNIQUE/EXAM DESCRIPTION AND NUMBER OF VIEWS: Single portable view of the chest. COMPARISON: 4/16/2019 FINDINGS: HEART: Stable size. LUNGS: No areas of air space disease are demonstrated. PLEURA: No effusion or pneumothorax.     No acute cardiac or pulmonary abnormalities are identified. Lung volumes are low.    Dx-chest-portable (1 View)    Result Date: 4/30/2019 4/30/2019 4:19 PM HISTORY/REASON FOR EXAM:  PICC line placement. IV access necessity. TECHNIQUE/EXAM DESCRIPTION AND NUMBER OF VIEWS: Single portable view of the chest. COMPARISON:  4/22/2019 FINDINGS: A right arm PICC line is present in satisfactory position with its tip projecting over the SVC at the level of the jennifer. The cardiac silhouette is within normal limits. No infiltrates or consolidations are noted. No pleural effusion is noted.     1.  Right arm PICC line tip projects in satisfactory position. 2.  Clear chest.    Dx-chest-portable (1 View)    Result Date: 4/16/2019 4/16/2019 5:43 AM HISTORY/REASON FOR EXAM: Shortness of Breath TECHNIQUE/EXAM DESCRIPTION:  Single AP view of the chest. COMPARISON: March 4, 2019 FINDINGS: Cardiomegaly is observed. The mediastinal contour appears within normal limits.  The central  pulmonary vasculature appears prominent and indistinct. Bilateral lung volumes are diminished.  Diffuse scattered hazy pulmonary parenchymal opacities are seen. No significant pleural effusions are identified. The bony structures appear age-appropriate.     1.  Mild pulmonary edema and/or infiltrate 2.  Cardiomegaly    Mr-brain-with & W/o    Result Date: 4/24/2019 4/24/2019 3:42 PM HISTORY/REASON FOR EXAM:  eval change of brain abscesses. TECHNIQUE/EXAM DESCRIPTION:   MRI of the brain without and with  contrast. T1 sagittal, T2 fast spin-echo axial, T1 coronal, FLAIR coronal, diffusion-weighted and apparent diffusion coefficient (ADC map) axial images were obtained of the whole brain. T1 postcontrast axial and T1 postcontrast coronal images were obtained. The study was performed on a [employer] Signa 1.5 Miranda MRI scanner. 6 mL Gadavist contrast was administered intravenously. COMPARISON:  3/7/2090 FINDINGS:     The post gadolinium sequences demonstrates multifocal areas of nodular enhancement seen throughout the supra and infratentorial brain parenchyma. Some of the lesions demonstrates surrounding T2 hyperintensity. There is no acute infarct. There is no acute or chronic parenchymal hemorrhage. There is no hydrocephalus. Mild cerebral volume loss is seen. There is no extra-axial fluid collection, hemorrhage or mass. The vertebrobasilar system is diminutive in caliber. There are fetal origins of bilateral posterior cerebral arteries. There is no large lesion identified in the expected course of the intracranial portions of the cranial nerves. The skull bones are unremarkable. The paranasal sinuses are clear. The extracranial soft tissue including orbits appear grossly normal.     Multiple punctate enhancing lesions seen throughout the supra and infratentorial brain parenchyma. When compared with the previous MRI there has been interval decrease in the size of the lesions. There are also interval reduction in the extent of the surrounding white matter edema in the restricted diffusion consistent with improvement/treatment response of the most of the multifocal brain abscesses. However there are new enhancing lesions in the left basal ganglia and right cerebellum consistent with interval new abscesses.Follow-up is recommended to ensure the complete resolution of the enhancing lesions. Please note that the radiological differential diagnosis of this multifocal lesions still includes metastasis. However decrease in the  size of the most of the lesions favors the diagnosis of infection.     Ct-cta Chest Pulmonary Artery W/ Recons    Result Date: 4/16/2019 4/16/2019 6:13 AM HISTORY/REASON FOR EXAM:  Shortness of breath TECHNIQUE/EXAM DESCRIPTION:  CT angiogram of the chest with contrast. Transaxial MDCT scan of chest post bolus 55 cc Omnipaque 350 IV administration. MIP reconstructed images were created and reviewed. Low dose optimization technique was utilized for this CT exam including automated exposure control and adjustment of the mA and/or kV according to patient size. COMPARISON:  March 5, 2019 FINDINGS: The visualized portion of the thyroid appear within normal limits.  The trachea and main stem airways are normal in caliber. There are no pathologically enlarged mediastinal lymph nodes. Trace pericardial effusion is seen, otherwise the heart and pericardium appear within normal limits.  Atherosclerotic calcifications are identified.  The pulmonary arteries are well opacified, no large central pulmonary arterial filling defect is appreciated, respiratory motion artifacts limit evaluation of the subsegmental pulmonary arterial branches. No pulmonary consolidation is identified. 7.7 mm medial pleural-based pulmonary nodule is seen on image 140. 6.5 mm pulmonary nodule is seen in the right midlung, best visualized on coronal imaging. Calcified granuloma in the left apex is seen. Limited views of the abdomen demonstrate enlargement of the liver. The bony structures are age appropriate.     1.  No large central pulmonary embolus is appreciated, evaluation of the subsegmental branches is essentially nondiagnostic due to motion artifacts. Additional imaging would be required for definitive exclusion of small distal pulmonary emboli. 2.  Trace pericardial effusion 3.  Hepatomegaly 4.  Atherosclerosis. 5.  Right pulmonary nodules, the largest is a 7.7 mm pulmonary nodule, see nodule follow-up recommendations below. Low Risk: CT at 3-6  "months, then consider CT at 18-24 months High Risk: CT at 3-6 months, then at 18-24 months Comments: Use most suspicious nodule as guide to management. Follow-up intervals may vary according to size and risk. Low Risk - Minimal or absent history of smoking and of other known risk factors. High Risk - History of smoking or of other known risk factors. Note: These recommendations do not apply to lung cancer screening, patients with immunosuppression, or patients with known primary cancer. Fleischner Society 2017 Guidelines for Management of Incidentally Detected Pulmonary Nodules in Adults     Ir-picc Line Placement W/ Guidance > Age 5    Result Date: 4/29/2019  HISTORY/REASON FOR EXAM:   PICC placement. TECHNIQUE/EXAM DESCRIPTION AND NUMBER OF VIEWS:   PICC line insertion with ultrasound guidance.  The procedure was performed using maximal sterile barrier technique including sterile gown, mask, cap, and donning of sterile gloves following appropriate hand hygiene and/or sterile scrub. Patient skin site was prepped with 2% Chlorhexidine solution. FINDINGS:  PICC line insertion with Ultrasound Guidance was performed by qualified nursing staff without the assistance of a Radiologist. PICC positioning appropriateness confirmed by 3CG technology; chest xray only needed in the instance 3CG unable to confirm placement.              Ultrasound-guided PICC placement performed by qualified nursing staff as above.       Micro:  Results     ** No results found for the last 168 hours. **           ASSESSMENT/PLAN:   Brain abscesses, unstable  Afebrile  Leukopenia  Original abscesses improved on therapy.   MRI 4/23 \"supra and infratentorial brain parenchyma... interval decrease in the size of the lesions. There are also interval reduction in the extent of the surrounding white matter edema in the restricted diffusion consistent with improvement/treatment response of the most of the multifocal brain abscesses.  New enhancing " "lesions in the left basal ganglia and right cerebellum consistent with interval new abscesses.\"   Mult blood cultures neg  CHAS neg 3/15  Continue vancomycin cefepime, flagyl  Repeat MRI next week     Gluteal and iliopsoas abscess, not improved  Adjacent to hardware in right hip (placed 12/17/18)  CT 4/23 \"Fluid collections within the right gluteal and iliacis muscles.. The collection within the right gluteal muscle has unchanged while the fluid collection within the right iliacis muscle is increased somewhat in size.\" 2.7 cm  Cultures 3/29 and 4/4 neg  Repeat CT next week if feasible to verify resolution      Encephalopathy-waxing and waning  Multifactorial: meds, brain abscess    Leukopenia,chronic  On Granix     Diabetes  Keep BS under 150 to help control current infection     H/o breast cancer  Ongoing concern that the lesions in her brain are metastatic in nature              "

## 2019-05-10 NOTE — [facility]
Hospital Medicine Progress Note, Adult, Complex               Author: Elvira Cox Date & Time created: 5/10/2019  6:49 AM     CC: brain abscesses    Interval History:  Ambulated 40 feet with walker  Met with patient's brother-he wants to take her to [facility] in [place] from here  ANC<1  Brother said patient had a problem with oxycodone previously  C/o hip pain, added cymbalta yesterday    Review of Systems:  Review of Systems   Constitutional: Negative for fever.   HENT: Negative for sore throat.    Respiratory: Negative for cough and shortness of breath.    Cardiovascular: Negative for chest pain.   Gastrointestinal: Negative for abdominal pain, nausea and vomiting.   Musculoskeletal: Positive for joint pain (hips).       T [id number]/78 RR 18 SaO2 92% I/O 2.1/inc  Physical Exam   Constitutional: She appears well-developed. No distress.   HENT:   Head: Normocephalic and atraumatic.   Eyes: Conjunctivae are normal. Right eye exhibits no discharge. Left eye exhibits no discharge. No scleral icterus.   Neck: No tracheal deviation present.   Cardiovascular: Normal rate, regular rhythm and intact distal pulses.    Pulmonary/Chest: Effort normal. No respiratory distress. She has no wheezes. She exhibits no tenderness.   Abdominal: Soft. Bowel sounds are normal. She exhibits no distension. There is no tenderness.   Musculoskeletal: She exhibits no edema.   Neurological: She is alert.        Skin: Skin is warm.   Vitals reviewed.      Labs:          Recent Labs      05/08/19 0316  05/10/19   0400   SODIUM  132*  128*   POTASSIUM  4.8  3.5*   CHLORIDE  103  100   CO2  22  21   BUN  15  16   CREATININE  0.63  0.50   CALCIUM  9.8  9.4     Recent Labs      05/08/19   0316  05/10/19   0400   GLUCOSE  74  82     Recent Labs      05/08/19   0316  05/10/19   0400   RBC  4.25  4.26   HEMOGLOBIN  11.1*  11.2*   HEMATOCRIT  35.1*  34.6*   PLATELETCT  174  184     Recent Labs      05/08/19   0316  05/10/19   0400   WBC  3.3*   3.0*   NEUTSPOLYS  38.70*  32.10*   LYMPHOCYTES  25.60  30.50   MONOCYTES  24.10*  25.50*   EOSINOPHILS  10.10*  10.60*   BASOPHILS  1.20  1.00          GI/Nutrition:  Orders Placed This Encounter   Procedures   • Diet Order Diabetic     Standing Status:   Standing     Number of Occurrences:   1     Order Specific Question:   Diet:     Answer:   Diabetic [3]     Order Specific Question:   Texture/Fiber modifications:     Answer:   Dysphagia 3(Mechanical Soft)specify fluid consistency(question 6) [3]     Order Specific Question:   Consistency/Fluid modifications:     Answer:   Thin Liquids [3]     Medical Decision Making, by Problem:  Brain abscesses v metastatic disease   -Vanco, cefepime, flagyl 5/25   -repeat MRI 5/20   -MRI 4/24 with some decrease in size and areas of new abscess  R gluteal/iliac abscess   -still present and slightly enlarged on last CT   -defer to ID   -add lidopatch/voltaren gel  H/o breast cancer with reconstruction  DM2  Metabolic encephalopathy   -follow    -improved  RLS   -mirapex  H/o DVT   -xarelto  Thrombocytopenia-resolved  Neutropenia   -granix   -reverse isolation   -defer to ID abx  R lung pulm nodule   -outpatient f/u  Mild hypokalemia   -replete  HTN   -norvasc, cozaar   -add BB for tachycardia and taper norvasc  HLD   -lipitor  GERD  Hyponatremia  Hypercalcemia   -sensipar  Debility      Quality-Core Measures   Reviewed items::  Medications reviewed  Sue catheter::  No Sue  Central line in place:  Concentrated IV drugs  DVT: xarelto.  Antibiotics:  Treating active infection/contamination beyond 24 hours perioperative coverage

## 2019-05-11 LAB
ANION GAP SERPL CALC-SCNC: 9 MMOL/L (ref 0–11.9)
BASOPHILS # BLD AUTO: 2 % (ref 0–1.8)
BASOPHILS # BLD: 0.18 K/UL (ref 0–0.12)
BUN SERPL-MCNC: 15 MG/DL (ref 8–22)
CALCIUM SERPL-MCNC: 9.4 MG/DL (ref 8.4–10.2)
CHLORIDE SERPL-SCNC: 100 MMOL/L (ref 96–112)
CO2 SERPL-SCNC: 20 MMOL/L (ref 20–33)
CREAT SERPL-MCNC: 0.57 MG/DL (ref 0.5–1.4)
DOHLE BOD BLD QL SMEAR: NORMAL
EOSINOPHIL # BLD AUTO: 0.7 K/UL (ref 0–0.51)
EOSINOPHIL NFR BLD: 8 % (ref 0–6.9)
ERYTHROCYTE [DISTWIDTH] IN BLOOD BY AUTOMATED COUNT: 59.6 FL (ref 35.9–50)
GLUCOSE SERPL-MCNC: 87 MG/DL (ref 65–99)
HCT VFR BLD AUTO: 34.2 % (ref 37–47)
HGB BLD-MCNC: 11.2 G/DL (ref 12–16)
LG PLATELETS BLD QL SMEAR: NORMAL
LYMPHOCYTES # BLD AUTO: 1.67 K/UL (ref 1–4.8)
LYMPHOCYTES NFR BLD: 19 % (ref 22–41)
MAGNESIUM SERPL-MCNC: 1.1 MG/DL (ref 1.5–2.5)
MANUAL DIFF BLD: NORMAL
MCH RBC QN AUTO: 26.7 PG (ref 27–33)
MCHC RBC AUTO-ENTMCNC: 32.7 G/DL (ref 33.6–35)
MCV RBC AUTO: 81.6 FL (ref 81.4–97.8)
MICROCYTES BLD QL SMEAR: ABNORMAL
MONOCYTES # BLD AUTO: 1.23 K/UL (ref 0–0.85)
MONOCYTES NFR BLD AUTO: 14 % (ref 0–13.4)
NEUTROPHILS # BLD AUTO: 5.02 K/UL (ref 2–7.15)
NEUTROPHILS NFR BLD: 48 % (ref 44–72)
NEUTS BAND NFR BLD MANUAL: 9 % (ref 0–10)
NRBC # BLD AUTO: 0 K/UL
NRBC BLD-RTO: 0 /100 WBC
PLATELET # BLD AUTO: 217 K/UL (ref 164–446)
PLATELET BLD QL SMEAR: NORMAL
PMV BLD AUTO: 11 FL (ref 9–12.9)
POLYCHROMASIA BLD QL SMEAR: NORMAL
POTASSIUM SERPL-SCNC: 3.6 MMOL/L (ref 3.6–5.5)
RBC # BLD AUTO: 4.19 M/UL (ref 4.2–5.4)
RBC BLD AUTO: PRESENT
SODIUM SERPL-SCNC: 129 MMOL/L (ref 135–145)
TOXIC GRANULES BLD QL SMEAR: SLIGHT
VANCOMYCIN TROUGH SERPL-MCNC: 15.3 UG/ML (ref 10–20)
WBC # BLD AUTO: 8.8 K/UL (ref 4.8–10.8)

## 2019-05-11 PROCEDURE — 80048 BASIC METABOLIC PNL TOTAL CA: CPT

## 2019-05-11 PROCEDURE — 83735 ASSAY OF MAGNESIUM: CPT

## 2019-05-11 PROCEDURE — 85007 BL SMEAR W/DIFF WBC COUNT: CPT

## 2019-05-11 PROCEDURE — 80202 ASSAY OF VANCOMYCIN: CPT

## 2019-05-11 PROCEDURE — 85027 COMPLETE CBC AUTOMATED: CPT

## 2019-05-11 ASSESSMENT — ENCOUNTER SYMPTOMS
SHORTNESS OF BREATH: 0
FEVER: 0
ABDOMINAL PAIN: 0
VOMITING: 0
COUGH: 0
NAUSEA: 0
SORE THROAT: 0

## 2019-05-11 NOTE — [facility]
Hospital Medicine Progress Note, Adult, Complex               Author: Elvira Cox Date & Time created: 5/11/2019  7:56 AM     CC: brain abscesses    Interval History:  Did not work with PT yesterday due to pain  ANC normal after granix, reverse isolation stopped  Wants increase medication for restless legs    Review of Systems:  Review of Systems   Constitutional: Negative for fever.   HENT: Negative for sore throat.    Respiratory: Negative for cough and shortness of breath.    Cardiovascular: Negative for chest pain.   Gastrointestinal: Negative for abdominal pain, nausea and vomiting.   Musculoskeletal: Positive for joint pain (hips).   Neurological:        Restless legs       T [id number]/70 RR 20 SaO2 94% I/O1.7/1.7 BM 5/10  Physical Exam   Constitutional: She appears well-developed. No distress.   HENT:   Head: Normocephalic and atraumatic.   Eyes: Conjunctivae are normal. Right eye exhibits no discharge. Left eye exhibits no discharge. No scleral icterus.   Neck: No tracheal deviation present.   Cardiovascular: Normal rate, regular rhythm and intact distal pulses.    Pulmonary/Chest: Effort normal. No respiratory distress. She has no wheezes. She exhibits no tenderness.   Abdominal: Soft. Bowel sounds are normal. She exhibits no distension. There is no tenderness.   Musculoskeletal: She exhibits no edema.   Neurological: She is alert.        Skin: Skin is warm.   Vitals reviewed.      Labs:          Recent Labs      05/10/19   0400  05/11/19   0255   SODIUM  128*  129*   POTASSIUM  3.5*  3.6   CHLORIDE  100  100   CO2  21  20   BUN  16  15   CREATININE  0.50  0.57   MAGNESIUM   --   1.1*   CALCIUM  9.4  9.4     Recent Labs      05/10/19   0400  05/11/19   0255   GLUCOSE  82  87     Recent Labs      05/10/19   0400  05/11/19   0255   RBC  4.26  4.19*   HEMOGLOBIN  11.2*  11.2*   HEMATOCRIT  34.6*  34.2*   PLATELETCT  184  217     Recent Labs      05/10/19   0400  05/11/19   0255   WBC  3.0*  8.8    NEUTSPOLYS  32.10*  48.00   LYMPHOCYTES  30.50  19.00*   MONOCYTES  25.50*  14.00*   EOSINOPHILS  10.60*  8.00*   BASOPHILS  1.00  2.00*          GI/Nutrition:  Orders Placed This Encounter   Procedures   • Diet Order Diabetic     Standing Status:   Standing     Number of Occurrences:   1     Order Specific Question:   Diet:     Answer:   Diabetic [3]     Order Specific Question:   Texture/Fiber modifications:     Answer:   Dysphagia 3(Mechanical Soft)specify fluid consistency(question 6) [3]     Order Specific Question:   Consistency/Fluid modifications:     Answer:   Thin Liquids [3]     Medical Decision Making, by Problem:  Brain abscesses v metastatic disease   -Vanco, cefepime, flagyl 5/25   -repeat MRI 5/20   -MRI 4/24 with some decrease in size and areas of new abscess  R gluteal/iliac abscess   -still present and slightly enlarged on last CT   -repeat CT next week   -continue idopatch/voltaren gel  H/o breast cancer with reconstruction  DM2  Metabolic encephalopathy   -follow    -improved  RLS   -mirapex (increase per patient request)  H/o DVT   -xarelto  Thrombocytopenia-resolved  Neutropenia-resolved   -follow   -granix 5/10  R lung pulm nodule   -outpatient f/u  Mild hypokalemia   -repleted  HTN   -norvasc, cozaar, metoprolol  hypomag   -replete  HLD   -lipitor  GERD  Hyponatremia  Hypercalcemia   -sensipar  Debility      Quality-Core Measures   Reviewed items::  Medications reviewed and Labs reviewed  Sue catheter::  No Sue  Central line in place:  Concentrated IV drugs  DVT: xarelto.  Antibiotics:  Treating active infection/contamination beyond 24 hours perioperative coverage

## 2019-05-11 NOTE — [facility]
Infectious Disease Progress Note    Author: Karen Garcia M.D. Date & Time of service: 5/11/2019  12:22 PM    Chief Complaint:  Brain abscess, iliopsoas abscess, leukopenia     Interval History:   68 y.o. female admitted 4/30/2019. 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  Work-up revealed a right iliopsoas lesion, gluteal abscess, and mult brain abscesses  5/6 Interval 24 hours of Vitals/Labs/Micro,and imaging results reviewed as available. See assessment.   5/8 Interval 24 hours of Vitals/Labs/Micro,and imaging results reviewed as available. See assessment.   5/10 AF WBC 3 continued c/o constipation denies HA, visual changes, neuro deficits  5/11 AF WBC 8.8 isolation stopped due to resolution neutropenia-sleepy today  Review of Systems:  Review of Systems   Unable to perform ROS: Other   Constitutional: Negative for fever.   Cardiovascular: Negative for chest pain.   Skin: Negative for rash.   Limited    Hemodynamics:  T 98.1T03RI404/70 RR 20 SaO2 94%    Physical Exam:  Physical Exam   Constitutional: No distress.   HENT:   Mouth/Throat: No oropharyngeal exudate.   Eyes: Pupils are equal, round, and reactive to light. Right eye exhibits no discharge. Left eye exhibits no discharge. No scleral icterus.   Neck: No JVD present.   Cardiovascular: Normal rate and regular rhythm.    Pulmonary/Chest: Effort normal. No stridor. No respiratory distress.   Abdominal: Soft. Bowel sounds are normal. She exhibits no distension. There is no tenderness. There is no rebound.   Musculoskeletal: She exhibits no edema.   PICC nontender   Lymphadenopathy:     She has no cervical adenopathy.   Neurological: She displays normal reflexes.   Skin: Skin is warm. No rash noted. She is not diaphoretic.   Psychiatric: She has a normal mood and  affect. Her behavior is normal.   Vitals reviewed.    Labs:  Recent Labs      05/10/19   0400  05/11/19   0255   WBC  3.0*  8.8   RBC  4.26  4.19*   HEMOGLOBIN  11.2*  11.2*   HEMATOCRIT  34.6*  34.2*   MCV  81.2*  81.6   MCH  26.3*  26.7*   RDW  57.5*  59.6*   PLATELETCT  184  217   MPV  10.0  11.0   NEUTSPOLYS  32.10*  48.00   LYMPHOCYTES  30.50  19.00*   MONOCYTES  25.50*  14.00*   EOSINOPHILS  10.60*  8.00*   BASOPHILS  1.00  2.00*   RBCMORPHOLO   --   Present     Recent Labs      05/10/19   0400  05/11/19   0255   SODIUM  128*  129*   POTASSIUM  3.5*  3.6   CHLORIDE  100  100   CO2  21  20   GLUCOSE  82  87   BUN  16  15     Recent Labs      05/10/19   0400  05/11/19   0255   CREATININE  0.50  0.57       Imaging:  Ct-abdomen-pelvis With    Result Date: 4/19/2019 4/18/2019 7:00 PM HISTORY/REASON FOR EXAM: Post-op infection, abdomen pelvis, known TECHNIQUE/EXAM DESCRIPTION:  CT abdomen and pelvis with contrast. Sequential axial CT images were obtained from lung bases to the proximal femurs after the uneventful administration of 100 cc Omnipaque 350 intravenous contrast. Low dose optimization technique was utilized for this CT exam including automated exposure control and adjustment of the mA and/or kV according to patient size. COMPARISON:  April 8, 2019 CT ABDOMEN FINDINGS: Linear densities are seen within the lung bases, appearance favors atelectasis. Trace pericardial effusion is noted. The liver is normal in contour. The liver is normal in size. No intrahepatic biliary ductal dilatation is noted. The gallbladder appears within normal limits. The spleen is unremarkable. The pancreas is grossly normal. Bilateral adrenal glands appear within normal limits. The kidneys are unremarkable.  The ureters are normal in caliber along their visualized course. The colon appears within normal limits. There is fluid filled distention of the small bowel is seen. Levoscoliosis of the lumbar spine is seen. Atherosclerotic  calcifications are seen. CT PELVIS FINDINGS: The bladder is within normal limits. Postsurgical changes of hysterectomy are noted. Enhancing fluid collection is seen in the right gluteal musculature measuring 2.0 cm. Multiloculated appearing density fluid collection along the right iliacus muscle is  seen measuring approximately 3.4 x 2.0 cm. Enlarged bilateral inguinal lymph nodes are noted. Postsurgical changes of bilateral SI joints and sacral screw fixation are noted. Bony remodeling of right iliac and sacral fractures is observed.     1.  Enhancing fluid collection in the right gluteal muscle, appearance compatible with small abscess. 2.  Loculated enhancing fluid collection in the right iliac is muscle, corresponding with site of prior drain, appearance compatible with reaccumulation of abscess. 3.  Fluid-filled distention of small bowel suggests ileus 4.  Atherosclerosis 5.  Trace pericardial effusion    Ct-abdomen-pelvis With    Result Date: 4/8/2019 4/8/2019 4:24 PM HISTORY/REASON FOR EXAM: Abdominal infection, abdominal pain Metastatic breast cancer TECHNIQUE/EXAM DESCRIPTION:  CT scan of the abdomen and pelvis with contrast. Contrast-enhanced helical scanning was obtained from the diaphragmatic domes through the pubic symphysis following the bolus administration of nonionic contrast without complication. 100 mL of Omnipaque 350 nonionic contrast was administered without complication. Low dose optimization technique was utilized for this CT exam including automated exposure control and adjustment of the mA and/or kV according to patient size. COMPARISON: CT abdomen and pelvis 4/2/2019 FINDINGS: Lung bases: The lung bases are clear. Bilateral breast implants are present. Osseous structures:  Surgical screw traverses both sacroiliac joint. There is fracture of the right ilium. There appears to be destructive change of the right medial sacrum. This is most consistent with known metastatic disease. Abdomen:  The liver is enlarged without focal abnormality. There is no biliary dilation. The spleen is normal in appearance. The pancreas is normal in appearance. The splenic vein and portal vein enhance normally. Both adrenal glands are normal in appearance. The right kidney is normal in appearance. The left kidney is normal in appearance. There is no hydronephrosis. Bowel and mesentery: Within normal limits. The aorta shows atherosclerosis without aneurysm. The terminal ileum is normal in appearance. The appendix is not identified. Pelvis: There is a drainage catheter within or just anterior to the right iliacus muscle. There is no residual associated fluid. Right gluteal drainage catheter has been removed.     1.  Resolution of right iliacus abscess with no associated fluid adjacent to the drainage catheter 2.  Interval removal of right gluteal drainage catheter 3.  Surgical screw traversing both sacroiliac joint with associated pathologic fracture of the right ilium and right medial sacrum 4.  Hepatomegaly    Ct-chest,abdomen,pelvis With    Result Date: 4/23/2019 4/23/2019 8:57 PM HISTORY/REASON FOR EXAM: Breast cancer. Shortness of breath. TECHNIQUE/EXAM DESCRIPTION: CT scan of the chest, abdomen and pelvis with contrast. Thin-section helical scanning was obtained with intravenous contrast from the lung apices through the pubic symphysis to include the chest, abdomen and pelvis. 80 mL of Omnipaque 350 nonionic contrast was administered intravenously without complication. Low dose optimization technique was utilized for this CT exam including automated exposure control and adjustment of the mA and/or kV according to patient size. COMPARISON: None Available FINDINGS: The entire study is degraded by patient respiratory motion artifact. CT CHEST: There is limited evaluation of the lungs due to extensive patient respiratory motion artifact. There is a linear atelectasis with groundglass opacity bilaterally. There are  bilateral breast implants. There are bilateral axillary lymph nodes seen which measure up to 13 mm in short axis dimension bilaterally. There are surgical clips within the axilla. There are small bilateral thyroid nodules. There is no evidence of pericardial effusion. There is moderate atherosclerotic change of the aorta or coronary vessels. CT ABDOMEN: There is fatty change of the liver. The spleen is enlarged. There is dense material layering dependently within the gallbladder consistent with gallstones versus vicarious excretion of contrast. The spleen is normal. The kidneys are normal. The adrenal glands are normal. The pancreas is normal. There are retroperitoneal lymph nodes which measure less than 10 mm in short axis dimension. Similar sized periportal and portacaval lymph nodes are seen as well. CT PELVIS: The appendix is not identified. There is moderate constipation. There is no evidence of bowel obstruction or inflammatory change. There is no evidence of free fluid. There screws extending through the sacrum bilaterally. There is surrounding lucency and fragmentation with fracture extending into the right ilium. There is some fluid seen within the right gluteal and iliac is muscles. The right gluteal fluid collection  measures 18 mm in size and is not significantly changed. The fluid collection within the right iliacis muscle has increased in size now measures 2.7 cm.     1.  Limited evaluation of the thorax due to extensive patient respiratory motion artifact. 2.  Borderline enlarged bilateral axillary lymph nodes. 3.  Linear atelectasis within the lungs with patchy groundglass opacity bilaterally. 4.  Again seen screws extending through the sacrum bilaterally. There is surrounding lucency and fragmentation of the sacrum as well as the right greater than left ilium and a right iliac fracture. It is uncertain if these represent metastatic lesions with pathologic fracture versus insufficiency fractures. 5.   Fluid collections within the right gluteal and iliacis muscles.. The collection within the right gluteal muscle has unchanged while the fluid collection within the right iliacis muscle is increased somewhat in size. Differential diagnosis includes hematoma as well as abscess. 6.  Enlarged retroperitoneal, periportal and portacaval lymph nodes.    Ct-head With & W/o    Result Date: 4/13/2019 4/13/2019 4:12 PM HISTORY/REASON FOR EXAM:  History of septic emboli, follow-up. TECHNIQUE/EXAM DESCRIPTION AND NUMBER OF VIEWS: CT scan of the head without and with contrast. The study was performed on a helical multidetector CT scanner. Contiguous axial sections were obtained from the skull base through the vertex both prior to and after the administration of IV contrast. 100 mL of Omnipaque 350 nonionic contrast was injected intravenously. Up to date radiation dose reduction adjustments have been utilized to meet ALARA standards for radiation dose reduction. COMPARISON:  CT dated 3/30/2019, 3/17/2019, MR dated 3/7/2019 FINDINGS: There are persistent small hyperenhancing lesions scattered throughout the brain. Largest lesion in the left basal ganglia measures 4.6 mm and demonstrates mild associated edema. Largest lesion in the right cerebral hemisphere measures 4 mm, also with mild associated edema. Several enhancing lesions are also seen in the brainstem. All of the lesions appear stable since prior. No new or enlarging lesions are detected The calvariae and skull base are unremarkable. There are no extraaxial fluid collections. The ventricular system and basal cisterns are within normal limits. There are no hemorrhagic lesions. The paranasal sinuses and mastoids in the field of view are unremarkable. Mild atrophy and chronic small vessel ischemic changes.     1.  No change in scattered hyperenhancing lesions throughout the brain, cerebellum and visualized brainstem. Some of them demonstrate mild associated edema, similar to  prior study. 2.  No CT evidence of infarct or hemorrhage.    Dx-chest-limited (1 View)    Result Date: 4/22/2019 4/22/2019 8:40 PM HISTORY/REASON FOR EXAM:  Shortness of Breath TECHNIQUE/EXAM DESCRIPTION AND NUMBER OF VIEWS: Single portable view of the chest. COMPARISON: 4/16/2019 FINDINGS: HEART: Stable size. LUNGS: No areas of air space disease are demonstrated. PLEURA: No effusion or pneumothorax.     No acute cardiac or pulmonary abnormalities are identified. Lung volumes are low.    Dx-chest-portable (1 View)    Result Date: 4/30/2019 4/30/2019 4:19 PM HISTORY/REASON FOR EXAM:  PICC line placement. IV access necessity. TECHNIQUE/EXAM DESCRIPTION AND NUMBER OF VIEWS: Single portable view of the chest. COMPARISON:  4/22/2019 FINDINGS: A right arm PICC line is present in satisfactory position with its tip projecting over the SVC at the level of the jennifer. The cardiac silhouette is within normal limits. No infiltrates or consolidations are noted. No pleural effusion is noted.     1.  Right arm PICC line tip projects in satisfactory position. 2.  Clear chest.    Dx-chest-portable (1 View)    Result Date: 4/16/2019 4/16/2019 5:43 AM HISTORY/REASON FOR EXAM: Shortness of Breath TECHNIQUE/EXAM DESCRIPTION:  Single AP view of the chest. COMPARISON: March 4, 2019 FINDINGS: Cardiomegaly is observed. The mediastinal contour appears within normal limits.  The central  pulmonary vasculature appears prominent and indistinct. Bilateral lung volumes are diminished.  Diffuse scattered hazy pulmonary parenchymal opacities are seen. No significant pleural effusions are identified. The bony structures appear age-appropriate.     1.  Mild pulmonary edema and/or infiltrate 2.  Cardiomegaly    Mr-brain-with & W/o    Result Date: 4/24/2019 4/24/2019 3:42 PM HISTORY/REASON FOR EXAM:  eval change of brain abscesses. TECHNIQUE/EXAM DESCRIPTION:   MRI of the brain without and with contrast. T1 sagittal, T2 fast spin-echo axial, T1  coronal, FLAIR coronal, diffusion-weighted and apparent diffusion coefficient (ADC map) axial images were obtained of the whole brain. T1 postcontrast axial and T1 postcontrast coronal images were obtained. The study was performed on a Yieldr Signa 1.5 Miranda MRI scanner. 6 mL Gadavist contrast was administered intravenously. COMPARISON:  3/7/2090 FINDINGS:     The post gadolinium sequences demonstrates multifocal areas of nodular enhancement seen throughout the supra and infratentorial brain parenchyma. Some of the lesions demonstrates surrounding T2 hyperintensity. There is no acute infarct. There is no acute or chronic parenchymal hemorrhage. There is no hydrocephalus. Mild cerebral volume loss is seen. There is no extra-axial fluid collection, hemorrhage or mass. The vertebrobasilar system is diminutive in caliber. There are fetal origins of bilateral posterior cerebral arteries. There is no large lesion identified in the expected course of the intracranial portions of the cranial nerves. The skull bones are unremarkable. The paranasal sinuses are clear. The extracranial soft tissue including orbits appear grossly normal.     Multiple punctate enhancing lesions seen throughout the supra and infratentorial brain parenchyma. When compared with the previous MRI there has been interval decrease in the size of the lesions. There are also interval reduction in the extent of the surrounding white matter edema in the restricted diffusion consistent with improvement/treatment response of the most of the multifocal brain abscesses. However there are new enhancing lesions in the left basal ganglia and right cerebellum consistent with interval new abscesses.Follow-up is recommended to ensure the complete resolution of the enhancing lesions. Please note that the radiological differential diagnosis of this multifocal lesions still includes metastasis. However decrease in the size of the most of the lesions favors the diagnosis  of infection.     Ct-cta Chest Pulmonary Artery W/ Recons    Result Date: 4/16/2019 4/16/2019 6:13 AM HISTORY/REASON FOR EXAM:  Shortness of breath TECHNIQUE/EXAM DESCRIPTION:  CT angiogram of the chest with contrast. Transaxial MDCT scan of chest post bolus 55 cc Omnipaque 350 IV administration. MIP reconstructed images were created and reviewed. Low dose optimization technique was utilized for this CT exam including automated exposure control and adjustment of the mA and/or kV according to patient size. COMPARISON:  March 5, 2019 FINDINGS: The visualized portion of the thyroid appear within normal limits.  The trachea and main stem airways are normal in caliber. There are no pathologically enlarged mediastinal lymph nodes. Trace pericardial effusion is seen, otherwise the heart and pericardium appear within normal limits.  Atherosclerotic calcifications are identified.  The pulmonary arteries are well opacified, no large central pulmonary arterial filling defect is appreciated, respiratory motion artifacts limit evaluation of the subsegmental pulmonary arterial branches. No pulmonary consolidation is identified. 7.7 mm medial pleural-based pulmonary nodule is seen on image 140. 6.5 mm pulmonary nodule is seen in the right midlung, best visualized on coronal imaging. Calcified granuloma in the left apex is seen. Limited views of the abdomen demonstrate enlargement of the liver. The bony structures are age appropriate.     1.  No large central pulmonary embolus is appreciated, evaluation of the subsegmental branches is essentially nondiagnostic due to motion artifacts. Additional imaging would be required for definitive exclusion of small distal pulmonary emboli. 2.  Trace pericardial effusion 3.  Hepatomegaly 4.  Atherosclerosis. 5.  Right pulmonary nodules, the largest is a 7.7 mm pulmonary nodule, see nodule follow-up recommendations below. Low Risk: CT at 3-6 months, then consider CT at 18-24 months High Risk:  "CT at 3-6 months, then at 18-24 months Comments: Use most suspicious nodule as guide to management. Follow-up intervals may vary according to size and risk. Low Risk - Minimal or absent history of smoking and of other known risk factors. High Risk - History of smoking or of other known risk factors. Note: These recommendations do not apply to lung cancer screening, patients with immunosuppression, or patients with known primary cancer. Fleischner Society 2017 Guidelines for Management of Incidentally Detected Pulmonary Nodules in Adults     Ir-picc Line Placement W/ Guidance > Age 5    Result Date: 4/29/2019  HISTORY/REASON FOR EXAM:   PICC placement. TECHNIQUE/EXAM DESCRIPTION AND NUMBER OF VIEWS:   PICC line insertion with ultrasound guidance.  The procedure was performed using maximal sterile barrier technique including sterile gown, mask, cap, and donning of sterile gloves following appropriate hand hygiene and/or sterile scrub. Patient skin site was prepped with 2% Chlorhexidine solution. FINDINGS:  PICC line insertion with Ultrasound Guidance was performed by qualified nursing staff without the assistance of a Radiologist. PICC positioning appropriateness confirmed by 3CG technology; chest xray only needed in the instance 3CG unable to confirm placement.              Ultrasound-guided PICC placement performed by qualified nursing staff as above.       Micro:  Results     ** No results found for the last 168 hours. **           ASSESSMENT/PLAN:   Brain abscesses, unstable  Afebrile  Leukopenia resolved  Original abscesses improved on therapy.   MRI 4/23 \"supra and infratentorial brain parenchyma... interval decrease in the size of the lesions. There are also interval reduction in the extent of the surrounding white matter edema in the restricted diffusion consistent with improvement/treatment response of the most of the multifocal brain abscesses.  New enhancing lesions in the left basal ganglia and right " "cerebellum  Mult blood cultures neg  CHAS neg 3/15  Continue vancomycin cefepime, flagyl  Repeat MRI next week     Gluteal and iliopsoas abscess, not improved  Adjacent to hardware in right hip (placed 12/17/18)  CT 4/23 \"Fluid collections within the right gluteal and iliacis muscles.. The collection within the right gluteal muscle has unchanged while the fluid collection within the right iliacis muscle is increased somewhat in size.\" 2.7 cm  Cultures 3/29 and 4/4 neg  Repeat CT next week if feasible to verify resolution      Encephalopathy-waxing and waning  Multifactorial: meds, brain abscess  Aspiration and fall  precautions    Leukopenia,chronic  On Granix  Resolved today     Diabetes  Keep BS under 150 to help control current infection     H/o breast cancer  Ongoing concern that the lesions in her brain are metastatic in nature              "

## 2019-05-12 ASSESSMENT — ENCOUNTER SYMPTOMS: FEVER: 0

## 2019-05-12 NOTE — [facility]
Hospital Medicine Progress Note, Adult, Complex               Author: Elvira Cox Date & Time created: 5/12/2019  5:26 AM     CC: brain abscesses    Interval History:  Worked with PT yesterday  Sleeping well now  L leg pain per RN overnight  Poor po intake    Review of Systems:  Review of Systems   Unable to perform ROS: Other       T 97.7Y22WU205/72 RR 20 SaO2 93% I/O2.1/1.4 BM 5/11  Physical Exam   Constitutional: She appears well-developed. No distress.   HENT:   Head: Normocephalic and atraumatic.   Eyes: Right eye exhibits no discharge. Left eye exhibits no discharge.   Neck: No tracheal deviation present.   Cardiovascular: Normal rate, regular rhythm and intact distal pulses.    Pulmonary/Chest: Effort normal. No respiratory distress. She has no wheezes.   Abdominal: Soft. Bowel sounds are normal. She exhibits no distension. There is no tenderness.   Musculoskeletal: She exhibits no edema.   Neurological:   sleeping     Skin: Skin is warm.   Vitals reviewed.      Labs:          Recent Labs      05/10/19   0400  05/11/19   0255   SODIUM  128*  129*   POTASSIUM  3.5*  3.6   CHLORIDE  100  100   CO2  21  20   BUN  16  15   CREATININE  0.50  0.57   MAGNESIUM   --   1.1*   CALCIUM  9.4  9.4     Recent Labs      05/10/19   0400  05/11/19   0255   GLUCOSE  82  87     Recent Labs      05/10/19   0400  05/11/19   0255   RBC  4.26  4.19*   HEMOGLOBIN  11.2*  11.2*   HEMATOCRIT  34.6*  34.2*   PLATELETCT  184  217     Recent Labs      05/10/19   0400  05/11/19   0255   WBC  3.0*  8.8   NEUTSPOLYS  32.10*  48.00   LYMPHOCYTES  30.50  19.00*   MONOCYTES  25.50*  14.00*   EOSINOPHILS  10.60*  8.00*   BASOPHILS  1.00  2.00*          GI/Nutrition:  Orders Placed This Encounter   Procedures   • Diet Order Diabetic     Standing Status:   Standing     Number of Occurrences:   1     Order Specific Question:   Diet:     Answer:   Diabetic [3]     Order Specific Question:   Texture/Fiber modifications:     Answer:   Dysphagia  3(Mechanical Soft)specify fluid consistency(question 6) [3]     Order Specific Question:   Consistency/Fluid modifications:     Answer:   Thin Liquids [3]     Medical Decision Making, by Problem:  Brain abscesses v metastatic disease   -Vanco, cefepime, flagyl 5/25   -repeat MRI 5/20   -MRI 4/24 with some decrease in size and areas of new abscess  R gluteal/iliac abscess   -still present and slightly enlarged on last CT   -repeat CT next week   -continue idopatch/voltaren gel  H/o breast cancer with reconstruction  DM2   -metformin  Metabolic encephalopathy   -follow    -improved  RLS   -mirapex   H/o DVT   -xarelto  Thrombocytopenia-resolved  Neutropenia-resolved   -follow   -granix 5/10  R lung pulm nodule   -outpatient f/u  Mild hypokalemia   -repleted  HTN   -norvasc, cozaar, metoprolol  hypomag   -repleted   -follow up in am  HLD   -lipitor  GERD  Hyponatremia  Hypercalcemia   -sensipar  Poor po intake   -add qhs remeron, no megace with h/o breast cancer  Debility   -PT when participates, [place] E[place]    Quality-Core Measures   Reviewed items::  Medications reviewed  Sue catheter::  No Sue  Central line in place:  Concentrated IV drugs  DVT: xarelto.  Antibiotics:  Treating active infection/contamination beyond 24 hours perioperative coverage

## 2019-05-12 NOTE — [facility]
Infectious Disease Progress Note    Author: Karen Garcia M.D. Date & Time of service: 5/12/2019  12:24 PM    Chief Complaint:  Brain abscess, iliopsoas abscess, leukopenia     Interval History:   68 y.o. female admitted 4/30/2019. 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  Work-up revealed a right iliopsoas lesion, gluteal abscess, and mult brain abscesses  5/6 Interval 24 hours of Vitals/Labs/Micro,and imaging results reviewed as available. See assessment.   5/8 Interval 24 hours of Vitals/Labs/Micro,and imaging results reviewed as available. See assessment.   5/10 AF WBC 3 continued c/o constipation denies HA, visual changes, neuro deficits  5/11 AF WBC 8.8 isolation stopped due to resolution neutropenia-sleepy today  5/12 AF no CBC somnolent-no acute events noted  Review of Systems:  Review of Systems   Unable to perform ROS: Other   Constitutional: Negative for fever.   Cardiovascular: Negative for chest pain.   Skin: Negative for rash.   Limited    Hemodynamics:  T 97.7K37UU990/72 RR 20 SaO2 93%    Physical Exam:  Physical Exam   Constitutional: No distress.   HENT:   Mouth/Throat: No oropharyngeal exudate.   Eyes: Pupils are equal, round, and reactive to light. Right eye exhibits no discharge. Left eye exhibits no discharge. No scleral icterus.   Neck: Neck supple. No JVD present.   Cardiovascular: Normal rate and regular rhythm.    Pulmonary/Chest: Effort normal. No stridor. She has no wheezes. She has no rales.   Abdominal: Soft. Bowel sounds are normal. There is no guarding.   Musculoskeletal: She exhibits no edema.   PICC nontender   Neurological: She displays normal reflexes.   Skin: No rash noted. She is not diaphoretic.   Psychiatric: She has a normal mood and affect. Her behavior is normal.   Vitals  reviewed.    Labs:  Recent Labs      05/10/19   0400  05/11/19   0255   WBC  3.0*  8.8   RBC  4.26  4.19*   HEMOGLOBIN  11.2*  11.2*   HEMATOCRIT  34.6*  34.2*   MCV  81.2*  81.6   MCH  26.3*  26.7*   RDW  57.5*  59.6*   PLATELETCT  184  217   MPV  10.0  11.0   NEUTSPOLYS  32.10*  48.00   LYMPHOCYTES  30.50  19.00*   MONOCYTES  25.50*  14.00*   EOSINOPHILS  10.60*  8.00*   BASOPHILS  1.00  2.00*   RBCMORPHOLO   --   Present     Recent Labs      05/10/19   0400  05/11/19   0255   SODIUM  128*  129*   POTASSIUM  3.5*  3.6   CHLORIDE  100  100   CO2  21  20   GLUCOSE  82  87   BUN  16  15     Recent Labs      05/10/19   0400  05/11/19   0255   CREATININE  0.50  0.57       Imaging:  Ct-abdomen-pelvis With    Result Date: 4/19/2019 4/18/2019 7:00 PM HISTORY/REASON FOR EXAM: Post-op infection, abdomen pelvis, known TECHNIQUE/EXAM DESCRIPTION:  CT abdomen and pelvis with contrast. Sequential axial CT images were obtained from lung bases to the proximal femurs after the uneventful administration of 100 cc Omnipaque 350 intravenous contrast. Low dose optimization technique was utilized for this CT exam including automated exposure control and adjustment of the mA and/or kV according to patient size. COMPARISON:  April 8, 2019 CT ABDOMEN FINDINGS: Linear densities are seen within the lung bases, appearance favors atelectasis. Trace pericardial effusion is noted. The liver is normal in contour. The liver is normal in size. No intrahepatic biliary ductal dilatation is noted. The gallbladder appears within normal limits. The spleen is unremarkable. The pancreas is grossly normal. Bilateral adrenal glands appear within normal limits. The kidneys are unremarkable.  The ureters are normal in caliber along their visualized course. The colon appears within normal limits. There is fluid filled distention of the small bowel is seen. Levoscoliosis of the lumbar spine is seen. Atherosclerotic calcifications are seen. CT PELVIS FINDINGS:  The bladder is within normal limits. Postsurgical changes of hysterectomy are noted. Enhancing fluid collection is seen in the right gluteal musculature measuring 2.0 cm. Multiloculated appearing density fluid collection along the right iliacus muscle is  seen measuring approximately 3.4 x 2.0 cm. Enlarged bilateral inguinal lymph nodes are noted. Postsurgical changes of bilateral SI joints and sacral screw fixation are noted. Bony remodeling of right iliac and sacral fractures is observed.     1.  Enhancing fluid collection in the right gluteal muscle, appearance compatible with small abscess. 2.  Loculated enhancing fluid collection in the right iliac is muscle, corresponding with site of prior drain, appearance compatible with reaccumulation of abscess. 3.  Fluid-filled distention of small bowel suggests ileus 4.  Atherosclerosis 5.  Trace pericardial effusion    Ct-abdomen-pelvis With    Result Date: 4/8/2019 4/8/2019 4:24 PM HISTORY/REASON FOR EXAM: Abdominal infection, abdominal pain Metastatic breast cancer TECHNIQUE/EXAM DESCRIPTION:  CT scan of the abdomen and pelvis with contrast. Contrast-enhanced helical scanning was obtained from the diaphragmatic domes through the pubic symphysis following the bolus administration of nonionic contrast without complication. 100 mL of Omnipaque 350 nonionic contrast was administered without complication. Low dose optimization technique was utilized for this CT exam including automated exposure control and adjustment of the mA and/or kV according to patient size. COMPARISON: CT abdomen and pelvis 4/2/2019 FINDINGS: Lung bases: The lung bases are clear. Bilateral breast implants are present. Osseous structures:  Surgical screw traverses both sacroiliac joint. There is fracture of the right ilium. There appears to be destructive change of the right medial sacrum. This is most consistent with known metastatic disease. Abdomen: The liver is enlarged without focal  abnormality. There is no biliary dilation. The spleen is normal in appearance. The pancreas is normal in appearance. The splenic vein and portal vein enhance normally. Both adrenal glands are normal in appearance. The right kidney is normal in appearance. The left kidney is normal in appearance. There is no hydronephrosis. Bowel and mesentery: Within normal limits. The aorta shows atherosclerosis without aneurysm. The terminal ileum is normal in appearance. The appendix is not identified. Pelvis: There is a drainage catheter within or just anterior to the right iliacus muscle. There is no residual associated fluid. Right gluteal drainage catheter has been removed.     1.  Resolution of right iliacus abscess with no associated fluid adjacent to the drainage catheter 2.  Interval removal of right gluteal drainage catheter 3.  Surgical screw traversing both sacroiliac joint with associated pathologic fracture of the right ilium and right medial sacrum 4.  Hepatomegaly    Ct-chest,abdomen,pelvis With    Result Date: 4/23/2019 4/23/2019 8:57 PM HISTORY/REASON FOR EXAM: Breast cancer. Shortness of breath. TECHNIQUE/EXAM DESCRIPTION: CT scan of the chest, abdomen and pelvis with contrast. Thin-section helical scanning was obtained with intravenous contrast from the lung apices through the pubic symphysis to include the chest, abdomen and pelvis. 80 mL of Omnipaque 350 nonionic contrast was administered intravenously without complication. Low dose optimization technique was utilized for this CT exam including automated exposure control and adjustment of the mA and/or kV according to patient size. COMPARISON: None Available FINDINGS: The entire study is degraded by patient respiratory motion artifact. CT CHEST: There is limited evaluation of the lungs due to extensive patient respiratory motion artifact. There is a linear atelectasis with groundglass opacity bilaterally. There are bilateral breast implants. There are  bilateral axillary lymph nodes seen which measure up to 13 mm in short axis dimension bilaterally. There are surgical clips within the axilla. There are small bilateral thyroid nodules. There is no evidence of pericardial effusion. There is moderate atherosclerotic change of the aorta or coronary vessels. CT ABDOMEN: There is fatty change of the liver. The spleen is enlarged. There is dense material layering dependently within the gallbladder consistent with gallstones versus vicarious excretion of contrast. The spleen is normal. The kidneys are normal. The adrenal glands are normal. The pancreas is normal. There are retroperitoneal lymph nodes which measure less than 10 mm in short axis dimension. Similar sized periportal and portacaval lymph nodes are seen as well. CT PELVIS: The appendix is not identified. There is moderate constipation. There is no evidence of bowel obstruction or inflammatory change. There is no evidence of free fluid. There screws extending through the sacrum bilaterally. There is surrounding lucency and fragmentation with fracture extending into the right ilium. There is some fluid seen within the right gluteal and iliac is muscles. The right gluteal fluid collection  measures 18 mm in size and is not significantly changed. The fluid collection within the right iliacis muscle has increased in size now measures 2.7 cm.     1.  Limited evaluation of the thorax due to extensive patient respiratory motion artifact. 2.  Borderline enlarged bilateral axillary lymph nodes. 3.  Linear atelectasis within the lungs with patchy groundglass opacity bilaterally. 4.  Again seen screws extending through the sacrum bilaterally. There is surrounding lucency and fragmentation of the sacrum as well as the right greater than left ilium and a right iliac fracture. It is uncertain if these represent metastatic lesions with pathologic fracture versus insufficiency fractures. 5.  Fluid collections within the right  gluteal and iliacis muscles.. The collection within the right gluteal muscle has unchanged while the fluid collection within the right iliacis muscle is increased somewhat in size. Differential diagnosis includes hematoma as well as abscess. 6.  Enlarged retroperitoneal, periportal and portacaval lymph nodes.    Ct-head With & W/o    Result Date: 4/13/2019 4/13/2019 4:12 PM HISTORY/REASON FOR EXAM:  History of septic emboli, follow-up. TECHNIQUE/EXAM DESCRIPTION AND NUMBER OF VIEWS: CT scan of the head without and with contrast. The study was performed on a helical multidetector CT scanner. Contiguous axial sections were obtained from the skull base through the vertex both prior to and after the administration of IV contrast. 100 mL of Omnipaque 350 nonionic contrast was injected intravenously. Up to date radiation dose reduction adjustments have been utilized to meet ALARA standards for radiation dose reduction. COMPARISON:  CT dated 3/30/2019, 3/17/2019, MR dated 3/7/2019 FINDINGS: There are persistent small hyperenhancing lesions scattered throughout the brain. Largest lesion in the left basal ganglia measures 4.6 mm and demonstrates mild associated edema. Largest lesion in the right cerebral hemisphere measures 4 mm, also with mild associated edema. Several enhancing lesions are also seen in the brainstem. All of the lesions appear stable since prior. No new or enlarging lesions are detected The calvariae and skull base are unremarkable. There are no extraaxial fluid collections. The ventricular system and basal cisterns are within normal limits. There are no hemorrhagic lesions. The paranasal sinuses and mastoids in the field of view are unremarkable. Mild atrophy and chronic small vessel ischemic changes.     1.  No change in scattered hyperenhancing lesions throughout the brain, cerebellum and visualized brainstem. Some of them demonstrate mild associated edema, similar to prior study. 2.  No CT evidence of  infarct or hemorrhage.    Dx-chest-limited (1 View)    Result Date: 4/22/2019 4/22/2019 8:40 PM HISTORY/REASON FOR EXAM:  Shortness of Breath TECHNIQUE/EXAM DESCRIPTION AND NUMBER OF VIEWS: Single portable view of the chest. COMPARISON: 4/16/2019 FINDINGS: HEART: Stable size. LUNGS: No areas of air space disease are demonstrated. PLEURA: No effusion or pneumothorax.     No acute cardiac or pulmonary abnormalities are identified. Lung volumes are low.    Dx-chest-portable (1 View)    Result Date: 4/30/2019 4/30/2019 4:19 PM HISTORY/REASON FOR EXAM:  PICC line placement. IV access necessity. TECHNIQUE/EXAM DESCRIPTION AND NUMBER OF VIEWS: Single portable view of the chest. COMPARISON:  4/22/2019 FINDINGS: A right arm PICC line is present in satisfactory position with its tip projecting over the SVC at the level of the jennifer. The cardiac silhouette is within normal limits. No infiltrates or consolidations are noted. No pleural effusion is noted.     1.  Right arm PICC line tip projects in satisfactory position. 2.  Clear chest.    Dx-chest-portable (1 View)    Result Date: 4/16/2019 4/16/2019 5:43 AM HISTORY/REASON FOR EXAM: Shortness of Breath TECHNIQUE/EXAM DESCRIPTION:  Single AP view of the chest. COMPARISON: March 4, 2019 FINDINGS: Cardiomegaly is observed. The mediastinal contour appears within normal limits.  The central  pulmonary vasculature appears prominent and indistinct. Bilateral lung volumes are diminished.  Diffuse scattered hazy pulmonary parenchymal opacities are seen. No significant pleural effusions are identified. The bony structures appear age-appropriate.     1.  Mild pulmonary edema and/or infiltrate 2.  Cardiomegaly    Mr-brain-with & W/o    Result Date: 4/24/2019 4/24/2019 3:42 PM HISTORY/REASON FOR EXAM:  eval change of brain abscesses. TECHNIQUE/EXAM DESCRIPTION:   MRI of the brain without and with contrast. T1 sagittal, T2 fast spin-echo axial, T1 coronal, FLAIR coronal,  diffusion-weighted and apparent diffusion coefficient (ADC map) axial images were obtained of the whole brain. T1 postcontrast axial and T1 postcontrast coronal images were obtained. The study was performed on a [employer] 1.5 Miranda MRI scanner. 6 mL Gadavist contrast was administered intravenously. COMPARISON:  3/7/2090 FINDINGS:     The post gadolinium sequences demonstrates multifocal areas of nodular enhancement seen throughout the supra and infratentorial brain parenchyma. Some of the lesions demonstrates surrounding T2 hyperintensity. There is no acute infarct. There is no acute or chronic parenchymal hemorrhage. There is no hydrocephalus. Mild cerebral volume loss is seen. There is no extra-axial fluid collection, hemorrhage or mass. The vertebrobasilar system is diminutive in caliber. There are fetal origins of bilateral posterior cerebral arteries. There is no large lesion identified in the expected course of the intracranial portions of the cranial nerves. The skull bones are unremarkable. The paranasal sinuses are clear. The extracranial soft tissue including orbits appear grossly normal.     Multiple punctate enhancing lesions seen throughout the supra and infratentorial brain parenchyma. When compared with the previous MRI there has been interval decrease in the size of the lesions. There are also interval reduction in the extent of the surrounding white matter edema in the restricted diffusion consistent with improvement/treatment response of the most of the multifocal brain abscesses. However there are new enhancing lesions in the left basal ganglia and right cerebellum consistent with interval new abscesses.Follow-up is recommended to ensure the complete resolution of the enhancing lesions. Please note that the radiological differential diagnosis of this multifocal lesions still includes metastasis. However decrease in the size of the most of the lesions favors the diagnosis of infection.     Ct-cta  Chest Pulmonary Artery W/ Recons    Result Date: 4/16/2019 4/16/2019 6:13 AM HISTORY/REASON FOR EXAM:  Shortness of breath TECHNIQUE/EXAM DESCRIPTION:  CT angiogram of the chest with contrast. Transaxial MDCT scan of chest post bolus 55 cc Omnipaque 350 IV administration. MIP reconstructed images were created and reviewed. Low dose optimization technique was utilized for this CT exam including automated exposure control and adjustment of the mA and/or kV according to patient size. COMPARISON:  March 5, 2019 FINDINGS: The visualized portion of the thyroid appear within normal limits.  The trachea and main stem airways are normal in caliber. There are no pathologically enlarged mediastinal lymph nodes. Trace pericardial effusion is seen, otherwise the heart and pericardium appear within normal limits.  Atherosclerotic calcifications are identified.  The pulmonary arteries are well opacified, no large central pulmonary arterial filling defect is appreciated, respiratory motion artifacts limit evaluation of the subsegmental pulmonary arterial branches. No pulmonary consolidation is identified. 7.7 mm medial pleural-based pulmonary nodule is seen on image 140. 6.5 mm pulmonary nodule is seen in the right midlung, best visualized on coronal imaging. Calcified granuloma in the left apex is seen. Limited views of the abdomen demonstrate enlargement of the liver. The bony structures are age appropriate.     1.  No large central pulmonary embolus is appreciated, evaluation of the subsegmental branches is essentially nondiagnostic due to motion artifacts. Additional imaging would be required for definitive exclusion of small distal pulmonary emboli. 2.  Trace pericardial effusion 3.  Hepatomegaly 4.  Atherosclerosis. 5.  Right pulmonary nodules, the largest is a 7.7 mm pulmonary nodule, see nodule follow-up recommendations below. Low Risk: CT at 3-6 months, then consider CT at 18-24 months High Risk: CT at 3-6 months, then at  "18-24 months Comments: Use most suspicious nodule as guide to management. Follow-up intervals may vary according to size and risk. Low Risk - Minimal or absent history of smoking and of other known risk factors. High Risk - History of smoking or of other known risk factors. Note: These recommendations do not apply to lung cancer screening, patients with immunosuppression, or patients with known primary cancer. Fleischner Society 2017 Guidelines for Management of Incidentally Detected Pulmonary Nodules in Adults     Ir-picc Line Placement W/ Guidance > Age 5    Result Date: 4/29/2019  HISTORY/REASON FOR EXAM:   PICC placement. TECHNIQUE/EXAM DESCRIPTION AND NUMBER OF VIEWS:   PICC line insertion with ultrasound guidance.  The procedure was performed using maximal sterile barrier technique including sterile gown, mask, cap, and donning of sterile gloves following appropriate hand hygiene and/or sterile scrub. Patient skin site was prepped with 2% Chlorhexidine solution. FINDINGS:  PICC line insertion with Ultrasound Guidance was performed by qualified nursing staff without the assistance of a Radiologist. PICC positioning appropriateness confirmed by 3CG technology; chest xray only needed in the instance 3CG unable to confirm placement.              Ultrasound-guided PICC placement performed by qualified nursing staff as above.       Micro:  Results     ** No results found for the last 168 hours. **           ASSESSMENT/PLAN:   Brain abscesses, unstable  Afebrile  Leukopenia resolved  Original abscesses improved on therapy.   MRI 4/23 \"supra and infratentorial brain parenchyma... interval decrease in the size of the lesions. There are also interval reduction in the extent of the surrounding white matter edema in the restricted diffusion consistent with improvement/treatment response of the most of the multifocal brain abscesses.  New enhancing lesions in the left basal ganglia and right cerebellum  Mult blood cultures " "neg  CHAS neg 3/15  Continue vancomycin cefepime, flagyl  Repeat MRI next week     Gluteal and iliopsoas abscess, not improved  Adjacent to hardware in right hip (placed 12/17/18)  CT 4/23 \"Fluid collections within the right gluteal and iliacis muscles.. The collection within the right gluteal muscle has unchanged while the fluid collection within the right iliacis muscle is increased somewhat in size.\" 2.7 cm  Cultures 3/29 and 4/4 neg  Repeat CT next week if feasible to verify resolution      Encephalopathy-persistent  Multifactorial: meds, brain abscess  Aspiration and fall  precautions    Leukopenia,chronic  On Granix  Resolved today     Diabetes  Keep BS under 150 to help control current infection     H/o breast cancer  Ongoing concern that the lesions in her brain are metastatic in nature              "

## 2019-05-13 LAB
ANION GAP SERPL CALC-SCNC: 6 MMOL/L (ref 0–11.9)
BASOPHILS # BLD AUTO: 1 % (ref 0–1.8)
BASOPHILS # BLD: 0.06 K/UL (ref 0–0.12)
BUN SERPL-MCNC: 13 MG/DL (ref 8–22)
CALCIUM SERPL-MCNC: 8.5 MG/DL (ref 8.4–10.2)
CHLORIDE SERPL-SCNC: 106 MMOL/L (ref 96–112)
CO2 SERPL-SCNC: 21 MMOL/L (ref 20–33)
CREAT SERPL-MCNC: 0.52 MG/DL (ref 0.5–1.4)
EOSINOPHIL # BLD AUTO: 0.38 K/UL (ref 0–0.51)
EOSINOPHIL NFR BLD: 6.1 % (ref 0–6.9)
ERYTHROCYTE [DISTWIDTH] IN BLOOD BY AUTOMATED COUNT: 59.7 FL (ref 35.9–50)
ERYTHROCYTE [SEDIMENTATION RATE] IN BLOOD BY WESTERGREN METHOD: 45 MM/HOUR (ref 0–30)
GLUCOSE SERPL-MCNC: 69 MG/DL (ref 65–99)
HCT VFR BLD AUTO: 33.2 % (ref 37–47)
HGB BLD-MCNC: 11 G/DL (ref 12–16)
IMM GRANULOCYTES # BLD AUTO: 0.1 K/UL (ref 0–0.11)
IMM GRANULOCYTES NFR BLD AUTO: 1.6 % (ref 0–0.9)
LYMPHOCYTES # BLD AUTO: 1.12 K/UL (ref 1–4.8)
LYMPHOCYTES NFR BLD: 18.1 % (ref 22–41)
MAGNESIUM SERPL-MCNC: 1.2 MG/DL (ref 1.5–2.5)
MCH RBC QN AUTO: 27.1 PG (ref 27–33)
MCHC RBC AUTO-ENTMCNC: 33.1 G/DL (ref 33.6–35)
MCV RBC AUTO: 81.8 FL (ref 81.4–97.8)
MONOCYTES # BLD AUTO: 1.06 K/UL (ref 0–0.85)
MONOCYTES NFR BLD AUTO: 17.1 % (ref 0–13.4)
NEUTROPHILS # BLD AUTO: 3.48 K/UL (ref 2–7.15)
NEUTROPHILS NFR BLD: 56.1 % (ref 44–72)
NRBC # BLD AUTO: 0 K/UL
NRBC BLD-RTO: 0 /100 WBC
PLATELET # BLD AUTO: 208 K/UL (ref 164–446)
PMV BLD AUTO: 10.3 FL (ref 9–12.9)
POTASSIUM SERPL-SCNC: 3.2 MMOL/L (ref 3.6–5.5)
RBC # BLD AUTO: 4.06 M/UL (ref 4.2–5.4)
SODIUM SERPL-SCNC: 133 MMOL/L (ref 135–145)
WBC # BLD AUTO: 6.2 K/UL (ref 4.8–10.8)

## 2019-05-13 PROCEDURE — 85025 COMPLETE CBC W/AUTO DIFF WBC: CPT

## 2019-05-13 PROCEDURE — 83735 ASSAY OF MAGNESIUM: CPT

## 2019-05-13 PROCEDURE — 80048 BASIC METABOLIC PNL TOTAL CA: CPT

## 2019-05-13 PROCEDURE — 85652 RBC SED RATE AUTOMATED: CPT

## 2019-05-13 ASSESSMENT — ENCOUNTER SYMPTOMS
SHORTNESS OF BREATH: 0
HEADACHES: 0
COUGH: 0
FEVER: 0
ABDOMINAL PAIN: 0
SORE THROAT: 0
CHILLS: 0
DIARRHEA: 0
VOMITING: 0
NAUSEA: 0
CONSTIPATION: 0

## 2019-05-13 NOTE — [facility]
Hospital Medicine Progress Note, Adult, Complex               Author: Elvira GARDENIA Cox Date & Time created: 5/13/2019  6:08 AM     CC: brain abscesses    Interval History:  Refused PT yesterday  Has vertigo symptoms with turning side to side in bed (not new)  Eating remains inconsistent  Afebrile    Review of Systems:  Review of Systems   Constitutional: Negative for chills and fever.   HENT: Negative for sore throat.    Respiratory: Negative for cough and shortness of breath.    Cardiovascular: Negative for chest pain.   Gastrointestinal: Negative for abdominal pain, constipation, nausea and vomiting.   Musculoskeletal: Positive for joint pain (hips).   Neurological: Negative for headaches.        Spinning with turning in bed       T [id number]/70 RR 1 SaO2 97% I/O1.5/1 BM 5/12  Physical Exam   Constitutional: She appears well-developed. No distress.   HENT:   Head: Normocephalic and atraumatic.   Eyes: Conjunctivae are normal. Right eye exhibits no discharge. Left eye exhibits no discharge. No scleral icterus.   Neck: No tracheal deviation present.   Cardiovascular: Normal rate, regular rhythm and intact distal pulses.    Pulmonary/Chest: Effort normal. No respiratory distress. She has no wheezes.   Abdominal: Soft. Bowel sounds are normal. She exhibits no distension. There is no tenderness.   Musculoskeletal: She exhibits no edema.   Neurological: She is alert.        Skin: Skin is warm.   Vitals reviewed.      Labs:          Recent Labs      05/11/19   0255   SODIUM  129*   POTASSIUM  3.6   CHLORIDE  100   CO2  20   BUN  15   CREATININE  0.57   MAGNESIUM  1.1*   CALCIUM  9.4     Recent Labs      05/11/19   0255   GLUCOSE  87     Recent Labs      05/11/19   0255  05/13/19   0545   RBC  4.19*  4.06*   HEMOGLOBIN  11.2*  11.0*   HEMATOCRIT  34.2*  33.2*   PLATELETCT  217  208     Recent Labs      05/11/19   0255  05/13/19   0545   WBC  8.8  6.2   NEUTSPOLYS  48.00  56.10   LYMPHOCYTES  19.00*  18.10*   MONOCYTES   14.00*  17.10*   EOSINOPHILS  8.00*  6.10   BASOPHILS  2.00*  1.00          GI/Nutrition:  Orders Placed This Encounter   Procedures   • Diet Order Diabetic     Standing Status:   Standing     Number of Occurrences:   1     Order Specific Question:   Diet:     Answer:   Diabetic [3]     Order Specific Question:   Texture/Fiber modifications:     Answer:   Dysphagia 3(Mechanical Soft)specify fluid consistency(question 6) [3]     Order Specific Question:   Consistency/Fluid modifications:     Answer:   Thin Liquids [3]     Medical Decision Making, by Problem:  Brain abscesses vs. metastatic disease   -Vanco, cefepime, flagyl 5/25   -repeat MRI 5/20   -MRI 4/24 with some decrease in size and areas of presumed new abscess  R gluteal/iliac abscess   -still present and slightly enlarged on last CT   -repeat CT next week   -continue idopatch/voltaren gel  H/o breast cancer with reconstruction  DM2   -metformin  Metabolic encephalopathy   -follow    -improved  RLS   -mirapex   H/o DVT   -xarelto  Thrombocytopenia-resolved  Neutropenia-resolved   -follow   -granix 5/10  R lung pulm nodule   -outpatient f/u  Mild hypokalemia   -repleted  HTN   -norvasc, cozaar, metoprolol  hypomag   -repleted   -follow P  HLD   -lipitor  GERD  Hyponatremia  Hypercalcemia   -sensipar  Poor po intake   -qhs remeron, no megace with h/o breast cancer  Debility   -PT when participates, brother [place] E[place]    Quality-Core Measures   Reviewed items::  Medications reviewed and Labs reviewed  Sue catheter::  No Sue  Central line in place:  Concentrated IV drugs  DVT: xarelto.  Antibiotics:  Treating active infection/contamination beyond 24 hours perioperative coverage

## 2019-05-13 NOTE — [facility]
Infectious Disease Progress Note    Author: Karen Garcia M.D. Date & Time of service: 5/13/2019  2:12 PM    Chief Complaint:  Brain abscess, iliopsoas abscess, leukopenia     Interval History:   68 y.o. female admitted 4/30/2019. 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  Work-up revealed a right iliopsoas lesion, gluteal abscess, and mult brain abscesses  5/6 Interval 24 hours of Vitals/Labs/Micro,and imaging results reviewed as available. See assessment.   5/8 Interval 24 hours of Vitals/Labs/Micro,and imaging results reviewed as available. See assessment.   5/10 AF WBC 3 continued c/o constipation denies HA, visual changes, neuro deficits  5/11 AF WBC 8.8 isolation stopped due to resolution neutropenia-sleepy today  5/12 AF no CBC somnolent-no acute events noted  5/13 AF WBC 6.2 c/o pain left hip today, unchanged Worse with movement and improved with ice. Refused PT yesterday   Review of Systems:  Review of Systems   Constitutional: Negative for fever.   Cardiovascular: Negative for chest pain.   Gastrointestinal: Negative for abdominal pain, diarrhea, nausea and vomiting.   Musculoskeletal: Positive for joint pain.   Skin: Negative for rash.   Limited    Hemodynamics:  T 98.3W67QT318/70 RR 1 SaO2 97%      Physical Exam:  Physical Exam   Constitutional: No distress.   HENT:   Mouth/Throat: No oropharyngeal exudate.   Eyes: Pupils are equal, round, and reactive to light. Right eye exhibits no discharge. Left eye exhibits no discharge. No scleral icterus.   Neck: Neck supple. No JVD present.   Cardiovascular: Normal rate and regular rhythm.    Pulmonary/Chest: Effort normal. No stridor. She has no wheezes. She has no rales.   Abdominal: Soft. Bowel sounds are normal. There is no guarding.   Musculoskeletal: She exhibits  no edema.   PICC nontender  Left hip no erythema or palpable masses-declined ROM exam   Neurological: She is alert. She displays normal reflexes.   Skin: No rash noted. She is not diaphoretic.   Psychiatric: She has a normal mood and affect. Her behavior is normal.   Vitals reviewed.    Labs:  Recent Labs      05/11/19 0255 05/13/19   0545   WBC  8.8  6.2   RBC  4.19*  4.06*   HEMOGLOBIN  11.2*  11.0*   HEMATOCRIT  34.2*  33.2*   MCV  81.6  81.8   MCH  26.7*  27.1   RDW  59.6*  59.7*   PLATELETCT  217  208   MPV  11.0  10.3   NEUTSPOLYS  48.00  56.10   LYMPHOCYTES  19.00*  18.10*   MONOCYTES  14.00*  17.10*   EOSINOPHILS  8.00*  6.10   BASOPHILS  2.00*  1.00   RBCMORPHOLO  Present   --      Recent Labs      05/11/19   0255  05/13/19   0545   SODIUM  129*  133*   POTASSIUM  3.6  3.2*   CHLORIDE  100  106   CO2  20  21   GLUCOSE  87  69   BUN  15  13     Recent Labs      05/11/19   0255  05/13/19   0545   CREATININE  0.57  0.52       Imaging:  Ct-abdomen-pelvis With    Result Date: 4/19/2019 4/18/2019 7:00 PM HISTORY/REASON FOR EXAM: Post-op infection, abdomen pelvis, known TECHNIQUE/EXAM DESCRIPTION:  CT abdomen and pelvis with contrast. Sequential axial CT images were obtained from lung bases to the proximal femurs after the uneventful administration of 100 cc Omnipaque 350 intravenous contrast. Low dose optimization technique was utilized for this CT exam including automated exposure control and adjustment of the mA and/or kV according to patient size. COMPARISON:  April 8, 2019 CT ABDOMEN FINDINGS: Linear densities are seen within the lung bases, appearance favors atelectasis. Trace pericardial effusion is noted. The liver is normal in contour. The liver is normal in size. No intrahepatic biliary ductal dilatation is noted. The gallbladder appears within normal limits. The spleen is unremarkable. The pancreas is grossly normal. Bilateral adrenal glands appear within normal limits. The kidneys are unremarkable.   The ureters are normal in caliber along their visualized course. The colon appears within normal limits. There is fluid filled distention of the small bowel is seen. Levoscoliosis of the lumbar spine is seen. Atherosclerotic calcifications are seen. CT PELVIS FINDINGS: The bladder is within normal limits. Postsurgical changes of hysterectomy are noted. Enhancing fluid collection is seen in the right gluteal musculature measuring 2.0 cm. Multiloculated appearing density fluid collection along the right iliacus muscle is  seen measuring approximately 3.4 x 2.0 cm. Enlarged bilateral inguinal lymph nodes are noted. Postsurgical changes of bilateral SI joints and sacral screw fixation are noted. Bony remodeling of right iliac and sacral fractures is observed.     1.  Enhancing fluid collection in the right gluteal muscle, appearance compatible with small abscess. 2.  Loculated enhancing fluid collection in the right iliac is muscle, corresponding with site of prior drain, appearance compatible with reaccumulation of abscess. 3.  Fluid-filled distention of small bowel suggests ileus 4.  Atherosclerosis 5.  Trace pericardial effusion    Ct-abdomen-pelvis With    Result Date: 4/8/2019 4/8/2019 4:24 PM HISTORY/REASON FOR EXAM: Abdominal infection, abdominal pain Metastatic breast cancer TECHNIQUE/EXAM DESCRIPTION:  CT scan of the abdomen and pelvis with contrast. Contrast-enhanced helical scanning was obtained from the diaphragmatic domes through the pubic symphysis following the bolus administration of nonionic contrast without complication. 100 mL of Omnipaque 350 nonionic contrast was administered without complication. Low dose optimization technique was utilized for this CT exam including automated exposure control and adjustment of the mA and/or kV according to patient size. COMPARISON: CT abdomen and pelvis 4/2/2019 FINDINGS: Lung bases: The lung bases are clear. Bilateral breast implants are present. Osseous  structures:  Surgical screw traverses both sacroiliac joint. There is fracture of the right ilium. There appears to be destructive change of the right medial sacrum. This is most consistent with known metastatic disease. Abdomen: The liver is enlarged without focal abnormality. There is no biliary dilation. The spleen is normal in appearance. The pancreas is normal in appearance. The splenic vein and portal vein enhance normally. Both adrenal glands are normal in appearance. The right kidney is normal in appearance. The left kidney is normal in appearance. There is no hydronephrosis. Bowel and mesentery: Within normal limits. The aorta shows atherosclerosis without aneurysm. The terminal ileum is normal in appearance. The appendix is not identified. Pelvis: There is a drainage catheter within or just anterior to the right iliacus muscle. There is no residual associated fluid. Right gluteal drainage catheter has been removed.     1.  Resolution of right iliacus abscess with no associated fluid adjacent to the drainage catheter 2.  Interval removal of right gluteal drainage catheter 3.  Surgical screw traversing both sacroiliac joint with associated pathologic fracture of the right ilium and right medial sacrum 4.  Hepatomegaly    Ct-chest,abdomen,pelvis With    Result Date: 4/23/2019 4/23/2019 8:57 PM HISTORY/REASON FOR EXAM: Breast cancer. Shortness of breath. TECHNIQUE/EXAM DESCRIPTION: CT scan of the chest, abdomen and pelvis with contrast. Thin-section helical scanning was obtained with intravenous contrast from the lung apices through the pubic symphysis to include the chest, abdomen and pelvis. 80 mL of Omnipaque 350 nonionic contrast was administered intravenously without complication. Low dose optimization technique was utilized for this CT exam including automated exposure control and adjustment of the mA and/or kV according to patient size. COMPARISON: None Available FINDINGS: The entire study is degraded  by patient respiratory motion artifact. CT CHEST: There is limited evaluation of the lungs due to extensive patient respiratory motion artifact. There is a linear atelectasis with groundglass opacity bilaterally. There are bilateral breast implants. There are bilateral axillary lymph nodes seen which measure up to 13 mm in short axis dimension bilaterally. There are surgical clips within the axilla. There are small bilateral thyroid nodules. There is no evidence of pericardial effusion. There is moderate atherosclerotic change of the aorta or coronary vessels. CT ABDOMEN: There is fatty change of the liver. The spleen is enlarged. There is dense material layering dependently within the gallbladder consistent with gallstones versus vicarious excretion of contrast. The spleen is normal. The kidneys are normal. The adrenal glands are normal. The pancreas is normal. There are retroperitoneal lymph nodes which measure less than 10 mm in short axis dimension. Similar sized periportal and portacaval lymph nodes are seen as well. CT PELVIS: The appendix is not identified. There is moderate constipation. There is no evidence of bowel obstruction or inflammatory change. There is no evidence of free fluid. There screws extending through the sacrum bilaterally. There is surrounding lucency and fragmentation with fracture extending into the right ilium. There is some fluid seen within the right gluteal and iliac is muscles. The right gluteal fluid collection  measures 18 mm in size and is not significantly changed. The fluid collection within the right iliacis muscle has increased in size now measures 2.7 cm.     1.  Limited evaluation of the thorax due to extensive patient respiratory motion artifact. 2.  Borderline enlarged bilateral axillary lymph nodes. 3.  Linear atelectasis within the lungs with patchy groundglass opacity bilaterally. 4.  Again seen screws extending through the sacrum bilaterally. There is surrounding  lucency and fragmentation of the sacrum as well as the right greater than left ilium and a right iliac fracture. It is uncertain if these represent metastatic lesions with pathologic fracture versus insufficiency fractures. 5.  Fluid collections within the right gluteal and iliacis muscles.. The collection within the right gluteal muscle has unchanged while the fluid collection within the right iliacis muscle is increased somewhat in size. Differential diagnosis includes hematoma as well as abscess. 6.  Enlarged retroperitoneal, periportal and portacaval lymph nodes.    Ct-head With & W/o    Result Date: 4/13/2019 4/13/2019 4:12 PM HISTORY/REASON FOR EXAM:  History of septic emboli, follow-up. TECHNIQUE/EXAM DESCRIPTION AND NUMBER OF VIEWS: CT scan of the head without and with contrast. The study was performed on a helical multidetector CT scanner. Contiguous axial sections were obtained from the skull base through the vertex both prior to and after the administration of IV contrast. 100 mL of Omnipaque 350 nonionic contrast was injected intravenously. Up to date radiation dose reduction adjustments have been utilized to meet ALARA standards for radiation dose reduction. COMPARISON:  CT dated 3/30/2019, 3/17/2019, MR dated 3/7/2019 FINDINGS: There are persistent small hyperenhancing lesions scattered throughout the brain. Largest lesion in the left basal ganglia measures 4.6 mm and demonstrates mild associated edema. Largest lesion in the right cerebral hemisphere measures 4 mm, also with mild associated edema. Several enhancing lesions are also seen in the brainstem. All of the lesions appear stable since prior. No new or enlarging lesions are detected The calvariae and skull base are unremarkable. There are no extraaxial fluid collections. The ventricular system and basal cisterns are within normal limits. There are no hemorrhagic lesions. The paranasal sinuses and mastoids in the field of view are unremarkable.  Mild atrophy and chronic small vessel ischemic changes.     1.  No change in scattered hyperenhancing lesions throughout the brain, cerebellum and visualized brainstem. Some of them demonstrate mild associated edema, similar to prior study. 2.  No CT evidence of infarct or hemorrhage.    Dx-chest-limited (1 View)    Result Date: 4/22/2019 4/22/2019 8:40 PM HISTORY/REASON FOR EXAM:  Shortness of Breath TECHNIQUE/EXAM DESCRIPTION AND NUMBER OF VIEWS: Single portable view of the chest. COMPARISON: 4/16/2019 FINDINGS: HEART: Stable size. LUNGS: No areas of air space disease are demonstrated. PLEURA: No effusion or pneumothorax.     No acute cardiac or pulmonary abnormalities are identified. Lung volumes are low.    Dx-chest-portable (1 View)    Result Date: 4/30/2019 4/30/2019 4:19 PM HISTORY/REASON FOR EXAM:  PICC line placement. IV access necessity. TECHNIQUE/EXAM DESCRIPTION AND NUMBER OF VIEWS: Single portable view of the chest. COMPARISON:  4/22/2019 FINDINGS: A right arm PICC line is present in satisfactory position with its tip projecting over the SVC at the level of the jennifer. The cardiac silhouette is within normal limits. No infiltrates or consolidations are noted. No pleural effusion is noted.     1.  Right arm PICC line tip projects in satisfactory position. 2.  Clear chest.    Dx-chest-portable (1 View)    Result Date: 4/16/2019 4/16/2019 5:43 AM HISTORY/REASON FOR EXAM: Shortness of Breath TECHNIQUE/EXAM DESCRIPTION:  Single AP view of the chest. COMPARISON: March 4, 2019 FINDINGS: Cardiomegaly is observed. The mediastinal contour appears within normal limits.  The central  pulmonary vasculature appears prominent and indistinct. Bilateral lung volumes are diminished.  Diffuse scattered hazy pulmonary parenchymal opacities are seen. No significant pleural effusions are identified. The bony structures appear age-appropriate.     1.  Mild pulmonary edema and/or infiltrate 2.   Cardiomegaly    Mr-brain-with & W/o    Result Date: 4/24/2019 4/24/2019 3:42 PM HISTORY/REASON FOR EXAM:  eval change of brain abscesses. TECHNIQUE/EXAM DESCRIPTION:   MRI of the brain without and with contrast. T1 sagittal, T2 fast spin-echo axial, T1 coronal, FLAIR coronal, diffusion-weighted and apparent diffusion coefficient (ADC map) axial images were obtained of the whole brain. T1 postcontrast axial and T1 postcontrast coronal images were obtained. The study was performed on a [employer] Signa 1.5 Miranda MRI scanner. 6 mL Gadavist contrast was administered intravenously. COMPARISON:  3/7/2090 FINDINGS:     The post gadolinium sequences demonstrates multifocal areas of nodular enhancement seen throughout the supra and infratentorial brain parenchyma. Some of the lesions demonstrates surrounding T2 hyperintensity. There is no acute infarct. There is no acute or chronic parenchymal hemorrhage. There is no hydrocephalus. Mild cerebral volume loss is seen. There is no extra-axial fluid collection, hemorrhage or mass. The vertebrobasilar system is diminutive in caliber. There are fetal origins of bilateral posterior cerebral arteries. There is no large lesion identified in the expected course of the intracranial portions of the cranial nerves. The skull bones are unremarkable. The paranasal sinuses are clear. The extracranial soft tissue including orbits appear grossly normal.     Multiple punctate enhancing lesions seen throughout the supra and infratentorial brain parenchyma. When compared with the previous MRI there has been interval decrease in the size of the lesions. There are also interval reduction in the extent of the surrounding white matter edema in the restricted diffusion consistent with improvement/treatment response of the most of the multifocal brain abscesses. However there are new enhancing lesions in the left basal ganglia and right cerebellum consistent with interval new abscesses.Follow-up is  recommended to ensure the complete resolution of the enhancing lesions. Please note that the radiological differential diagnosis of this multifocal lesions still includes metastasis. However decrease in the size of the most of the lesions favors the diagnosis of infection.     Ct-cta Chest Pulmonary Artery W/ Recons    Result Date: 4/16/2019 4/16/2019 6:13 AM HISTORY/REASON FOR EXAM:  Shortness of breath TECHNIQUE/EXAM DESCRIPTION:  CT angiogram of the chest with contrast. Transaxial MDCT scan of chest post bolus 55 cc Omnipaque 350 IV administration. MIP reconstructed images were created and reviewed. Low dose optimization technique was utilized for this CT exam including automated exposure control and adjustment of the mA and/or kV according to patient size. COMPARISON:  March 5, 2019 FINDINGS: The visualized portion of the thyroid appear within normal limits.  The trachea and main stem airways are normal in caliber. There are no pathologically enlarged mediastinal lymph nodes. Trace pericardial effusion is seen, otherwise the heart and pericardium appear within normal limits.  Atherosclerotic calcifications are identified.  The pulmonary arteries are well opacified, no large central pulmonary arterial filling defect is appreciated, respiratory motion artifacts limit evaluation of the subsegmental pulmonary arterial branches. No pulmonary consolidation is identified. 7.7 mm medial pleural-based pulmonary nodule is seen on image 140. 6.5 mm pulmonary nodule is seen in the right midlung, best visualized on coronal imaging. Calcified granuloma in the left apex is seen. Limited views of the abdomen demonstrate enlargement of the liver. The bony structures are age appropriate.     1.  No large central pulmonary embolus is appreciated, evaluation of the subsegmental branches is essentially nondiagnostic due to motion artifacts. Additional imaging would be required for definitive exclusion of small distal pulmonary  "emboli. 2.  Trace pericardial effusion 3.  Hepatomegaly 4.  Atherosclerosis. 5.  Right pulmonary nodules, the largest is a 7.7 mm pulmonary nodule, see nodule follow-up recommendations below. Low Risk: CT at 3-6 months, then consider CT at 18-24 months High Risk: CT at 3-6 months, then at 18-24 months Comments: Use most suspicious nodule as guide to management. Follow-up intervals may vary according to size and risk. Low Risk - Minimal or absent history of smoking and of other known risk factors. High Risk - History of smoking or of other known risk factors. Note: These recommendations do not apply to lung cancer screening, patients with immunosuppression, or patients with known primary cancer. Fleischner Society 2017 Guidelines for Management of Incidentally Detected Pulmonary Nodules in Adults     Ir-picc Line Placement W/ Guidance > Age 5    Result Date: 4/29/2019  HISTORY/REASON FOR EXAM:   PICC placement. TECHNIQUE/EXAM DESCRIPTION AND NUMBER OF VIEWS:   PICC line insertion with ultrasound guidance.  The procedure was performed using maximal sterile barrier technique including sterile gown, mask, cap, and donning of sterile gloves following appropriate hand hygiene and/or sterile scrub. Patient skin site was prepped with 2% Chlorhexidine solution. FINDINGS:  PICC line insertion with Ultrasound Guidance was performed by qualified nursing staff without the assistance of a Radiologist. PICC positioning appropriateness confirmed by 3CG technology; chest xray only needed in the instance 3CG unable to confirm placement.              Ultrasound-guided PICC placement performed by qualified nursing staff as above.       Micro:  Results     ** No results found for the last 168 hours. **           ASSESSMENT/PLAN:   Brain abscesses, additional work  Afebrile  Leukopenia resolved  Original abscesses improved on therapy.   MRI 4/23 \"supra and infratentorial brain parenchyma... interval decrease in the size of the lesions. " "There are also interval reduction in the extent of the surrounding white matter edema in the restricted diffusion consistent with improvement/treatment response of the most of the multifocal brain abscesses.  New enhancing lesions in the left basal ganglia and right cerebellum  Mult blood cultures neg  CAHS neg 3/15  Continue vancomycin cefepime, flagyl  Repeat MRI this week     Gluteal and iliopsoas abscess, not improved  Adjacent to hardware in right hip (placed 12/17/18)  CT 4/23 \"Fluid collections within the right gluteal and iliacis muscles.. The collection within the right gluteal muscle has unchanged while the fluid collection within the right iliacis muscle is increased somewhat in size.\" 2.7 cm  Cultures 3/29 and 4/4 neg  Still having pain  Repeat CT this week if feasible to verify resolution      Encephalopathy-persistent  Multifactorial: meds, brain abscess  Aspiration and fall  precautions    Leukopenia,chronic  On Granix  Resolved today     Diabetes  Keep BS under 150 to help control current infection     H/o breast cancer  Ongoing concern that the lesions in her brain are metastatic in nature              "

## 2019-05-14 LAB
ANION GAP SERPL CALC-SCNC: 6 MMOL/L (ref 0–11.9)
BUN SERPL-MCNC: 12 MG/DL (ref 8–22)
CALCIUM SERPL-MCNC: 8.2 MG/DL (ref 8.4–10.2)
CHLORIDE SERPL-SCNC: 107 MMOL/L (ref 96–112)
CO2 SERPL-SCNC: 21 MMOL/L (ref 20–33)
CREAT SERPL-MCNC: 0.56 MG/DL (ref 0.5–1.4)
GLUCOSE SERPL-MCNC: 79 MG/DL (ref 65–99)
MAGNESIUM SERPL-MCNC: 1.6 MG/DL (ref 1.5–2.5)
POTASSIUM SERPL-SCNC: 3.5 MMOL/L (ref 3.6–5.5)
SODIUM SERPL-SCNC: 134 MMOL/L (ref 135–145)

## 2019-05-14 PROCEDURE — 83735 ASSAY OF MAGNESIUM: CPT

## 2019-05-14 PROCEDURE — 80048 BASIC METABOLIC PNL TOTAL CA: CPT

## 2019-05-14 ASSESSMENT — ENCOUNTER SYMPTOMS
HEARTBURN: 0
WHEEZING: 0
ABDOMINAL PAIN: 0
CHILLS: 0
FEVER: 0
DIZZINESS: 1
BLURRED VISION: 0
SHORTNESS OF BREATH: 0
NAUSEA: 0
WEAKNESS: 1
DIARRHEA: 0
HEADACHES: 0
MYALGIAS: 1
VOMITING: 0

## 2019-05-14 NOTE — [facility]
Hospital Medicine Progress Note, Adult, Complex               Author: Odalysmely Braswellugh Date & Time created: 5/14/2019  9:03 AM     CC: brain abscesses    Interval History:  This is a 68 y.o. female here with history of , history of COPD, diabetes, hypertension, depression, anemia right hip pain, fever and abnormal brain mri. lesions on MRI showed improvement with antibiotics so this is continued for a total of 6 weeks.  She is afebrile but resistant to changing positions at times due to chronic hip pain    Review of Systems:  Review of Systems   Constitutional: Negative for chills and fever.   HENT: Negative for hearing loss.    Respiratory: Negative for shortness of breath and wheezing.    Cardiovascular: Negative for chest pain and leg swelling.   Gastrointestinal: Negative for abdominal pain, heartburn and nausea.   Genitourinary: Negative for dysuria and hematuria.   Musculoskeletal: Positive for myalgias.        Bilateral hip pain with movement   Skin: Negative for itching.   Neurological: Positive for weakness. Negative for headaches.       Physical Exam:  Physical Exam   Constitutional: She is oriented to person, place, and time. No distress.   HENT:   Mouth/Throat: Oropharynx is clear and moist.   Eyes: Pupils are equal, round, and reactive to light. EOM are normal. No scleral icterus.   Neck: Neck supple. No tracheal deviation present.   Cardiovascular: Normal rate and regular rhythm.  PMI is displaced.    Pulmonary/Chest: Effort normal and breath sounds normal. No stridor. She has no wheezes.   Abdominal: Soft. She exhibits no distension. There is no tenderness.   Musculoskeletal: She exhibits no edema.   Neurological: She is alert and oriented to person, place, and time.   Skin: Skin is warm and dry. No rash noted. She is not diaphoretic.   Vitals reviewed.      Labs:          Recent Labs      05/13/19   0545  05/14/19   0500   SODIUM  133*  134*   POTASSIUM  3.2*  3.5*   CHLORIDE  106  107   CO2  21  21    BUN  13  12   CREATININE  0.52  0.56   MAGNESIUM  1.2*  1.6   CALCIUM  8.5  8.2*     Recent Labs      05/13/19   0545  05/14/19   0500   GLUCOSE  69  79     Recent Labs      05/13/19   0545   RBC  4.06*   HEMOGLOBIN  11.0*   HEMATOCRIT  33.2*   PLATELETCT  208     Recent Labs      05/13/19   0545   WBC  6.2   NEUTSPOLYS  56.10   LYMPHOCYTES  18.10*   MONOCYTES  17.10*   EOSINOPHILS  6.10   BASOPHILS  1.00           Hemodynamics:   T96.9 /65 HR79 RR18 O2 sat 100%     GI/Nutrition:  Orders Placed This Encounter   Procedures   • Diet Order Diabetic     Standing Status:   Standing     Number of Occurrences:   1     Order Specific Question:   Diet:     Answer:   Diabetic [3]     Order Specific Question:   Texture/Fiber modifications:     Answer:   Dysphagia 3(Mechanical Soft)specify fluid consistency(question 6) [3]     Order Specific Question:   Consistency/Fluid modifications:     Answer:   Thin Liquids [3]     Medical Decision Making, by Problem:  Brain abscesses on MRI with improvement on antibiotics   Repeat MRI 5/20 to evaluate brain abscesses for improvement on therapy   ID following, cefepime, flagyl and vancomycin    No endocarditis on CHAS   No abscess on MRI of lumbar spine    Pelvic abscess resolved on repeat imaging, drain removed   May need to repeat imaging if pain in hips continues to be a barrier    H/o pelvic fractures, pins in sacrum    breast cancer bilateral mastectomy status post chemotherapy, treated over 20 years ago per patient, outpatient follow up    Slurred speech due to brain lesions,    Speech therapy, speech improved  Dysphagia, dysphagia diet   History of dvt, xarelto  Thrombocytopenia, improved off merrem    Restless leg syndrome, mirapex  Dyslipidemia, pravachol  Essential hypertension, norvasc  Debility, patient resides at a SNF, will need placement on discharge    DNR      Quality-Core Measures   Reviewed items::  Labs reviewed and Medications reviewed  Sue catheter::  No  Sue  DVT prophylaxis pharmacological::  Enoxaparin (Lovenox)  Ulcer Prophylaxis::  Yes  Antibiotics:  Treating active infection/contamination beyond 24 hours perioperative coverage  Assessed for rehabilitation services:  Patient was assess for and/or received rehabilitation services during this hospitalization

## 2019-05-14 NOTE — [facility]
Infectious Disease Progress Note    Author: Karen Garcia M.D. Date & Time of service: 5/14/2019  2:41 PM    Chief Complaint:  Brain abscess, iliopsoas abscess, leukopenia     Interval History:   68 y.o. female admitted 4/30/2019. 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  Work-up revealed a right iliopsoas lesion, gluteal abscess, and mult brain abscesses  5/6 Interval 24 hours of Vitals/Labs/Micro,and imaging results reviewed as available. See assessment.   5/8 Interval 24 hours of Vitals/Labs/Micro,and imaging results reviewed as available. See assessment.   5/10 AF WBC 3 continued c/o constipation denies HA, visual changes, neuro deficits  5/11 AF WBC 8.8 isolation stopped due to resolution neutropenia-sleepy today  5/12 AF no CBC somnolent-no acute events noted  5/13 AF WBC 6.2 c/o pain left hip today, unchanged Worse with movement and improved with ice. Refused PT yesterday   5/14 AF c/o dizzyness whenever she tirns on her side-no new HA or visual changes-still havng hip pain  Review of Systems:  Review of Systems   Constitutional: Negative for fever.   Eyes: Negative for blurred vision.   Cardiovascular: Negative for chest pain.   Gastrointestinal: Negative for abdominal pain, diarrhea, nausea and vomiting.   Musculoskeletal: Positive for joint pain.   Skin: Negative for rash.   Neurological: Positive for dizziness. Negative for headaches.   Limited    Hemodynamics:  T96.9 /65 HR79 RR18 O2 sat 100%    Physical Exam:  Physical Exam   Constitutional: No distress.   HENT:   Mouth/Throat: No oropharyngeal exudate.   Eyes: Pupils are equal, round, and reactive to light. Right eye exhibits no discharge. Left eye exhibits no discharge. No scleral icterus.   Neck: Neck supple. No JVD present.   Cardiovascular: Normal  rate and regular rhythm.    Pulmonary/Chest: Effort normal. No stridor. No respiratory distress.   Abdominal: Soft. Bowel sounds are normal. There is no guarding.   Musculoskeletal: She exhibits tenderness. She exhibits no edema.   PICC nontender  Left hip no erythema or palpable masses-declined ROM exam   Neurological: She is alert. She displays normal reflexes.   Skin: Skin is warm. No rash noted. She is not diaphoretic.   Psychiatric: She has a normal mood and affect. Her behavior is normal.   Vitals reviewed.    Labs:  Recent Labs      05/13/19   0545   WBC  6.2   RBC  4.06*   HEMOGLOBIN  11.0*   HEMATOCRIT  33.2*   MCV  81.8   MCH  27.1   RDW  59.7*   PLATELETCT  208   MPV  10.3   NEUTSPOLYS  56.10   LYMPHOCYTES  18.10*   MONOCYTES  17.10*   EOSINOPHILS  6.10   BASOPHILS  1.00     Recent Labs      05/13/19   0545  05/14/19   0500   SODIUM  133*  134*   POTASSIUM  3.2*  3.5*   CHLORIDE  106  107   CO2  21  21   GLUCOSE  69  79   BUN  13  12     Recent Labs      05/13/19 0545  05/14/19   0500   CREATININE  0.52  0.56       Imaging:  Ct-abdomen-pelvis With    Result Date: 4/19/2019 4/18/2019 7:00 PM HISTORY/REASON FOR EXAM: Post-op infection, abdomen pelvis, known TECHNIQUE/EXAM DESCRIPTION:  CT abdomen and pelvis with contrast. Sequential axial CT images were obtained from lung bases to the proximal femurs after the uneventful administration of 100 cc Omnipaque 350 intravenous contrast. Low dose optimization technique was utilized for this CT exam including automated exposure control and adjustment of the mA and/or kV according to patient size. COMPARISON:  April 8, 2019 CT ABDOMEN FINDINGS: Linear densities are seen within the lung bases, appearance favors atelectasis. Trace pericardial effusion is noted. The liver is normal in contour. The liver is normal in size. No intrahepatic biliary ductal dilatation is noted. The gallbladder appears within normal limits. The spleen is unremarkable. The pancreas is  grossly normal. Bilateral adrenal glands appear within normal limits. The kidneys are unremarkable.  The ureters are normal in caliber along their visualized course. The colon appears within normal limits. There is fluid filled distention of the small bowel is seen. Levoscoliosis of the lumbar spine is seen. Atherosclerotic calcifications are seen. CT PELVIS FINDINGS: The bladder is within normal limits. Postsurgical changes of hysterectomy are noted. Enhancing fluid collection is seen in the right gluteal musculature measuring 2.0 cm. Multiloculated appearing density fluid collection along the right iliacus muscle is  seen measuring approximately 3.4 x 2.0 cm. Enlarged bilateral inguinal lymph nodes are noted. Postsurgical changes of bilateral SI joints and sacral screw fixation are noted. Bony remodeling of right iliac and sacral fractures is observed.     1.  Enhancing fluid collection in the right gluteal muscle, appearance compatible with small abscess. 2.  Loculated enhancing fluid collection in the right iliac is muscle, corresponding with site of prior drain, appearance compatible with reaccumulation of abscess. 3.  Fluid-filled distention of small bowel suggests ileus 4.  Atherosclerosis 5.  Trace pericardial effusion    Ct-abdomen-pelvis With    Result Date: 4/8/2019 4/8/2019 4:24 PM HISTORY/REASON FOR EXAM: Abdominal infection, abdominal pain Metastatic breast cancer TECHNIQUE/EXAM DESCRIPTION:  CT scan of the abdomen and pelvis with contrast. Contrast-enhanced helical scanning was obtained from the diaphragmatic domes through the pubic symphysis following the bolus administration of nonionic contrast without complication. 100 mL of Omnipaque 350 nonionic contrast was administered without complication. Low dose optimization technique was utilized for this CT exam including automated exposure control and adjustment of the mA and/or kV according to patient size. COMPARISON: CT abdomen and pelvis 4/2/2019  FINDINGS: Lung bases: The lung bases are clear. Bilateral breast implants are present. Osseous structures:  Surgical screw traverses both sacroiliac joint. There is fracture of the right ilium. There appears to be destructive change of the right medial sacrum. This is most consistent with known metastatic disease. Abdomen: The liver is enlarged without focal abnormality. There is no biliary dilation. The spleen is normal in appearance. The pancreas is normal in appearance. The splenic vein and portal vein enhance normally. Both adrenal glands are normal in appearance. The right kidney is normal in appearance. The left kidney is normal in appearance. There is no hydronephrosis. Bowel and mesentery: Within normal limits. The aorta shows atherosclerosis without aneurysm. The terminal ileum is normal in appearance. The appendix is not identified. Pelvis: There is a drainage catheter within or just anterior to the right iliacus muscle. There is no residual associated fluid. Right gluteal drainage catheter has been removed.     1.  Resolution of right iliacus abscess with no associated fluid adjacent to the drainage catheter 2.  Interval removal of right gluteal drainage catheter 3.  Surgical screw traversing both sacroiliac joint with associated pathologic fracture of the right ilium and right medial sacrum 4.  Hepatomegaly    Ct-chest,abdomen,pelvis With    Result Date: 4/23/2019 4/23/2019 8:57 PM HISTORY/REASON FOR EXAM: Breast cancer. Shortness of breath. TECHNIQUE/EXAM DESCRIPTION: CT scan of the chest, abdomen and pelvis with contrast. Thin-section helical scanning was obtained with intravenous contrast from the lung apices through the pubic symphysis to include the chest, abdomen and pelvis. 80 mL of Omnipaque 350 nonionic contrast was administered intravenously without complication. Low dose optimization technique was utilized for this CT exam including automated exposure control and adjustment of the mA and/or  kV according to patient size. COMPARISON: None Available FINDINGS: The entire study is degraded by patient respiratory motion artifact. CT CHEST: There is limited evaluation of the lungs due to extensive patient respiratory motion artifact. There is a linear atelectasis with groundglass opacity bilaterally. There are bilateral breast implants. There are bilateral axillary lymph nodes seen which measure up to 13 mm in short axis dimension bilaterally. There are surgical clips within the axilla. There are small bilateral thyroid nodules. There is no evidence of pericardial effusion. There is moderate atherosclerotic change of the aorta or coronary vessels. CT ABDOMEN: There is fatty change of the liver. The spleen is enlarged. There is dense material layering dependently within the gallbladder consistent with gallstones versus vicarious excretion of contrast. The spleen is normal. The kidneys are normal. The adrenal glands are normal. The pancreas is normal. There are retroperitoneal lymph nodes which measure less than 10 mm in short axis dimension. Similar sized periportal and portacaval lymph nodes are seen as well. CT PELVIS: The appendix is not identified. There is moderate constipation. There is no evidence of bowel obstruction or inflammatory change. There is no evidence of free fluid. There screws extending through the sacrum bilaterally. There is surrounding lucency and fragmentation with fracture extending into the right ilium. There is some fluid seen within the right gluteal and iliac is muscles. The right gluteal fluid collection  measures 18 mm in size and is not significantly changed. The fluid collection within the right iliacis muscle has increased in size now measures 2.7 cm.     1.  Limited evaluation of the thorax due to extensive patient respiratory motion artifact. 2.  Borderline enlarged bilateral axillary lymph nodes. 3.  Linear atelectasis within the lungs with patchy groundglass opacity  bilaterally. 4.  Again seen screws extending through the sacrum bilaterally. There is surrounding lucency and fragmentation of the sacrum as well as the right greater than left ilium and a right iliac fracture. It is uncertain if these represent metastatic lesions with pathologic fracture versus insufficiency fractures. 5.  Fluid collections within the right gluteal and iliacis muscles.. The collection within the right gluteal muscle has unchanged while the fluid collection within the right iliacis muscle is increased somewhat in size. Differential diagnosis includes hematoma as well as abscess. 6.  Enlarged retroperitoneal, periportal and portacaval lymph nodes.    Ct-head With & W/o    Result Date: 4/13/2019 4/13/2019 4:12 PM HISTORY/REASON FOR EXAM:  History of septic emboli, follow-up. TECHNIQUE/EXAM DESCRIPTION AND NUMBER OF VIEWS: CT scan of the head without and with contrast. The study was performed on a helical multidetector CT scanner. Contiguous axial sections were obtained from the skull base through the vertex both prior to and after the administration of IV contrast. 100 mL of Omnipaque 350 nonionic contrast was injected intravenously. Up to date radiation dose reduction adjustments have been utilized to meet ALARA standards for radiation dose reduction. COMPARISON:  CT dated 3/30/2019, 3/17/2019, MR dated 3/7/2019 FINDINGS: There are persistent small hyperenhancing lesions scattered throughout the brain. Largest lesion in the left basal ganglia measures 4.6 mm and demonstrates mild associated edema. Largest lesion in the right cerebral hemisphere measures 4 mm, also with mild associated edema. Several enhancing lesions are also seen in the brainstem. All of the lesions appear stable since prior. No new or enlarging lesions are detected The calvariae and skull base are unremarkable. There are no extraaxial fluid collections. The ventricular system and basal cisterns are within normal limits. There are  no hemorrhagic lesions. The paranasal sinuses and mastoids in the field of view are unremarkable. Mild atrophy and chronic small vessel ischemic changes.     1.  No change in scattered hyperenhancing lesions throughout the brain, cerebellum and visualized brainstem. Some of them demonstrate mild associated edema, similar to prior study. 2.  No CT evidence of infarct or hemorrhage.    Dx-chest-limited (1 View)    Result Date: 4/22/2019 4/22/2019 8:40 PM HISTORY/REASON FOR EXAM:  Shortness of Breath TECHNIQUE/EXAM DESCRIPTION AND NUMBER OF VIEWS: Single portable view of the chest. COMPARISON: 4/16/2019 FINDINGS: HEART: Stable size. LUNGS: No areas of air space disease are demonstrated. PLEURA: No effusion or pneumothorax.     No acute cardiac or pulmonary abnormalities are identified. Lung volumes are low.    Dx-chest-portable (1 View)    Result Date: 4/30/2019 4/30/2019 4:19 PM HISTORY/REASON FOR EXAM:  PICC line placement. IV access necessity. TECHNIQUE/EXAM DESCRIPTION AND NUMBER OF VIEWS: Single portable view of the chest. COMPARISON:  4/22/2019 FINDINGS: A right arm PICC line is present in satisfactory position with its tip projecting over the SVC at the level of the jennifer. The cardiac silhouette is within normal limits. No infiltrates or consolidations are noted. No pleural effusion is noted.     1.  Right arm PICC line tip projects in satisfactory position. 2.  Clear chest.    Dx-chest-portable (1 View)    Result Date: 4/16/2019 4/16/2019 5:43 AM HISTORY/REASON FOR EXAM: Shortness of Breath TECHNIQUE/EXAM DESCRIPTION:  Single AP view of the chest. COMPARISON: March 4, 2019 FINDINGS: Cardiomegaly is observed. The mediastinal contour appears within normal limits.  The central  pulmonary vasculature appears prominent and indistinct. Bilateral lung volumes are diminished.  Diffuse scattered hazy pulmonary parenchymal opacities are seen. No significant pleural effusions are identified. The bony structures  appear age-appropriate.     1.  Mild pulmonary edema and/or infiltrate 2.  Cardiomegaly    Mr-brain-with & W/o    Result Date: 4/24/2019 4/24/2019 3:42 PM HISTORY/REASON FOR EXAM:  eval change of brain abscesses. TECHNIQUE/EXAM DESCRIPTION:   MRI of the brain without and with contrast. T1 sagittal, T2 fast spin-echo axial, T1 coronal, FLAIR coronal, diffusion-weighted and apparent diffusion coefficient (ADC map) axial images were obtained of the whole brain. T1 postcontrast axial and T1 postcontrast coronal images were obtained. The study was performed on a [employer] 1.5 Mrianda MRI scanner. 6 mL Gadavist contrast was administered intravenously. COMPARISON:  3/7/2090 FINDINGS:     The post gadolinium sequences demonstrates multifocal areas of nodular enhancement seen throughout the supra and infratentorial brain parenchyma. Some of the lesions demonstrates surrounding T2 hyperintensity. There is no acute infarct. There is no acute or chronic parenchymal hemorrhage. There is no hydrocephalus. Mild cerebral volume loss is seen. There is no extra-axial fluid collection, hemorrhage or mass. The vertebrobasilar system is diminutive in caliber. There are fetal origins of bilateral posterior cerebral arteries. There is no large lesion identified in the expected course of the intracranial portions of the cranial nerves. The skull bones are unremarkable. The paranasal sinuses are clear. The extracranial soft tissue including orbits appear grossly normal.     Multiple punctate enhancing lesions seen throughout the supra and infratentorial brain parenchyma. When compared with the previous MRI there has been interval decrease in the size of the lesions. There are also interval reduction in the extent of the surrounding white matter edema in the restricted diffusion consistent with improvement/treatment response of the most of the multifocal brain abscesses. However there are new enhancing lesions in the left basal ganglia and  right cerebellum consistent with interval new abscesses.Follow-up is recommended to ensure the complete resolution of the enhancing lesions. Please note that the radiological differential diagnosis of this multifocal lesions still includes metastasis. However decrease in the size of the most of the lesions favors the diagnosis of infection.     Ct-cta Chest Pulmonary Artery W/ Recons    Result Date: 4/16/2019 4/16/2019 6:13 AM HISTORY/REASON FOR EXAM:  Shortness of breath TECHNIQUE/EXAM DESCRIPTION:  CT angiogram of the chest with contrast. Transaxial MDCT scan of chest post bolus 55 cc Omnipaque 350 IV administration. MIP reconstructed images were created and reviewed. Low dose optimization technique was utilized for this CT exam including automated exposure control and adjustment of the mA and/or kV according to patient size. COMPARISON:  March 5, 2019 FINDINGS: The visualized portion of the thyroid appear within normal limits.  The trachea and main stem airways are normal in caliber. There are no pathologically enlarged mediastinal lymph nodes. Trace pericardial effusion is seen, otherwise the heart and pericardium appear within normal limits.  Atherosclerotic calcifications are identified.  The pulmonary arteries are well opacified, no large central pulmonary arterial filling defect is appreciated, respiratory motion artifacts limit evaluation of the subsegmental pulmonary arterial branches. No pulmonary consolidation is identified. 7.7 mm medial pleural-based pulmonary nodule is seen on image 140. 6.5 mm pulmonary nodule is seen in the right midlung, best visualized on coronal imaging. Calcified granuloma in the left apex is seen. Limited views of the abdomen demonstrate enlargement of the liver. The bony structures are age appropriate.     1.  No large central pulmonary embolus is appreciated, evaluation of the subsegmental branches is essentially nondiagnostic due to motion artifacts. Additional imaging  "would be required for definitive exclusion of small distal pulmonary emboli. 2.  Trace pericardial effusion 3.  Hepatomegaly 4.  Atherosclerosis. 5.  Right pulmonary nodules, the largest is a 7.7 mm pulmonary nodule, see nodule follow-up recommendations below. Low Risk: CT at 3-6 months, then consider CT at 18-24 months High Risk: CT at 3-6 months, then at 18-24 months Comments: Use most suspicious nodule as guide to management. Follow-up intervals may vary according to size and risk. Low Risk - Minimal or absent history of smoking and of other known risk factors. High Risk - History of smoking or of other known risk factors. Note: These recommendations do not apply to lung cancer screening, patients with immunosuppression, or patients with known primary cancer. Fleischner Society 2017 Guidelines for Management of Incidentally Detected Pulmonary Nodules in Adults     Ir-picc Line Placement W/ Guidance > Age 5    Result Date: 4/29/2019  HISTORY/REASON FOR EXAM:   PICC placement. TECHNIQUE/EXAM DESCRIPTION AND NUMBER OF VIEWS:   PICC line insertion with ultrasound guidance.  The procedure was performed using maximal sterile barrier technique including sterile gown, mask, cap, and donning of sterile gloves following appropriate hand hygiene and/or sterile scrub. Patient skin site was prepped with 2% Chlorhexidine solution. FINDINGS:  PICC line insertion with Ultrasound Guidance was performed by qualified nursing staff without the assistance of a Radiologist. PICC positioning appropriateness confirmed by 3CG technology; chest xray only needed in the instance 3CG unable to confirm placement.              Ultrasound-guided PICC placement performed by qualified nursing staff as above.       Micro:  Results     ** No results found for the last 168 hours. **           ASSESSMENT/PLAN:   Brain abscesses, additional work  Afebrile  Leukopenia resolved  Original abscesses improved on therapy.   MRI 4/23 \"supra and " "infratentorial brain parenchyma... interval decrease in the size of the lesions. There are also interval reduction in the extent of the surrounding white matter edema in the restricted diffusion consistent with improvement/treatment response of the most of the multifocal brain abscesses.  New enhancing lesions in the left basal ganglia and right cerebellum  Mult blood cultures neg  CHAS neg 3/15  DIzzyness likely related to brain abscesses  Continue vancomycin cefepime, flagyl  Repeat MRI this week     Gluteal and iliopsoas abscess, not improved  Adjacent to hardware in right hip (placed 12/17/18)  CT 4/23 \"Fluid collections within the right gluteal and iliacis muscles.. The collection within the right gluteal muscle has unchanged while the fluid collection within the right iliacis muscle is increased somewhat in size.\" 2.7 cm  Cultures 3/29 and 4/4 neg  Still having pain-not much mproved  Repeat CT this week if feasible to verify resolution      Encephalopathy-persistent  Multifactorial: meds, brain abscess  Aspiration and fall  precautions    Leukopenia,chronic  On Granix  Resolved today     Diabetes  Keep BS under 150 to help control current infection     H/o breast cancer  Ongoing concern that the lesions in her brain are metastatic in nature              "

## 2019-05-15 LAB
ANION GAP SERPL CALC-SCNC: 8 MMOL/L (ref 0–11.9)
BUN SERPL-MCNC: 11 MG/DL (ref 8–22)
CALCIUM SERPL-MCNC: 8.7 MG/DL (ref 8.4–10.2)
CHLORIDE SERPL-SCNC: 104 MMOL/L (ref 96–112)
CO2 SERPL-SCNC: 21 MMOL/L (ref 20–33)
CREAT SERPL-MCNC: 0.49 MG/DL (ref 0.5–1.4)
ERYTHROCYTE [DISTWIDTH] IN BLOOD BY AUTOMATED COUNT: 59.7 FL (ref 35.9–50)
GLUCOSE SERPL-MCNC: 87 MG/DL (ref 65–99)
HCT VFR BLD AUTO: 34.8 % (ref 37–47)
HGB BLD-MCNC: 11.5 G/DL (ref 12–16)
MCH RBC QN AUTO: 27 PG (ref 27–33)
MCHC RBC AUTO-ENTMCNC: 33 G/DL (ref 33.6–35)
MCV RBC AUTO: 81.7 FL (ref 81.4–97.8)
PLATELET # BLD AUTO: 187 K/UL (ref 164–446)
PMV BLD AUTO: 10.2 FL (ref 9–12.9)
POTASSIUM SERPL-SCNC: 3.6 MMOL/L (ref 3.6–5.5)
RBC # BLD AUTO: 4.26 M/UL (ref 4.2–5.4)
SODIUM SERPL-SCNC: 133 MMOL/L (ref 135–145)
VANCOMYCIN TROUGH SERPL-MCNC: 12.4 UG/ML (ref 10–20)
WBC # BLD AUTO: 5.1 K/UL (ref 4.8–10.8)

## 2019-05-15 PROCEDURE — 302244 HCHG LTACH STAT

## 2019-05-15 PROCEDURE — 80202 ASSAY OF VANCOMYCIN: CPT

## 2019-05-15 PROCEDURE — 80048 BASIC METABOLIC PNL TOTAL CA: CPT

## 2019-05-15 PROCEDURE — 85027 COMPLETE CBC AUTOMATED: CPT

## 2019-05-15 ASSESSMENT — ENCOUNTER SYMPTOMS
WEAKNESS: 1
HEADACHES: 0
HEARTBURN: 0
DIARRHEA: 0
FEVER: 0
CONSTIPATION: 0
ABDOMINAL PAIN: 0
PALPITATIONS: 0
MYALGIAS: 1
SHORTNESS OF BREATH: 0
COUGH: 0

## 2019-05-15 NOTE — [facility]
Hospital Medicine Progress Note, Adult, Complex               Author: Odalys Mitchell Date & Time created: 5/15/2019  11:03 AM     CC: brain abscesses    Interval History:  This is a 68 y.o. female here with history of , history of COPD, diabetes, hypertension, depression, anemia right hip pain, fever and abnormal brain mri. lesions on MRI showed improvement with antibiotics so this is continued for a total of 6 weeks.  The patient is ambulating farther and did ambulate 110 feet  She does not tolerate sitting well due to hip/pelvic pain with sitting from screws/fractures from Dec 2018    Review of Systems:  Review of Systems   Constitutional: Negative for fever.   HENT: Negative for hearing loss.    Respiratory: Negative for cough and shortness of breath.    Cardiovascular: Negative for chest pain, palpitations and leg swelling.   Gastrointestinal: Negative for abdominal pain, constipation, diarrhea and heartburn.   Genitourinary: Negative for dysuria and hematuria.   Musculoskeletal: Positive for myalgias.        Bilateral hip pain with movement, overall less    Skin: Negative for itching.   Neurological: Positive for weakness. Negative for headaches.       Physical Exam:  Physical Exam   Constitutional: She is oriented to person, place, and time. No distress.   HENT:   Mouth/Throat: Oropharynx is clear and moist. No oropharyngeal exudate.   Eyes: Pupils are equal, round, and reactive to light. Right eye exhibits no discharge. Left eye exhibits no discharge. No scleral icterus.   Neck: No tracheal deviation present.   Cardiovascular: Normal rate and regular rhythm.  PMI is displaced.    Pulmonary/Chest: Effort normal and breath sounds normal. No stridor. She has no wheezes. She has no rales.   Abdominal: Soft. Bowel sounds are normal. She exhibits no distension. There is no tenderness.   Musculoskeletal: She exhibits no edema.   Neurological: She is alert and oriented to person, place, and time.   Skin: Skin is dry.  No rash noted. She is not diaphoretic. No erythema.   Vitals reviewed.      Labs:          Recent Labs      05/13/19   0545  05/14/19   0500  05/15/19   0530   SODIUM  133*  134*  133*   POTASSIUM  3.2*  3.5*  3.6   CHLORIDE  106  107  104   CO2  21  21  21   BUN  13  12  11   CREATININE  0.52  0.56  0.49*   MAGNESIUM  1.2*  1.6   --    CALCIUM  8.5  8.2*  8.7     Recent Labs      05/13/19   0545  05/14/19   0500  05/15/19   0530   GLUCOSE  69  79  87     Recent Labs      05/13/19   0545  05/15/19   0530   RBC  4.06*  4.26   HEMOGLOBIN  11.0*  11.5*   HEMATOCRIT  33.2*  34.8*   PLATELETCT  208  187     Recent Labs      05/13/19   0545  05/15/19   0530   WBC  6.2  5.1   NEUTSPOLYS  56.10   --    LYMPHOCYTES  18.10*   --    MONOCYTES  17.10*   --    EOSINOPHILS  6.10   --    BASOPHILS  1.00   --            Hemodynamics:   T98.2 /84 HR77 RR18 O2 sat 92%     GI/Nutrition:  Orders Placed This Encounter   Procedures   • Diet Order Diabetic     Standing Status:   Standing     Number of Occurrences:   1     Order Specific Question:   Diet:     Answer:   Diabetic [3]     Order Specific Question:   Texture/Fiber modifications:     Answer:   Dysphagia 3(Mechanical Soft)specify fluid consistency(question 6) [3]     Order Specific Question:   Consistency/Fluid modifications:     Answer:   Thin Liquids [3]     Medical Decision Making, by Problem:  Brain abscesses on MRI with improvement on antibiotics   Repeat MRI 5/20 to evaluate brain abscesses for improvement    ID following, cefepime, flagyl and vancomycin    No endocarditis on CHAS   No abscess on MRI of lumbar spine    Pelvic abscess resolved on repeat imaging, drain removed   May repeat imaging if requested by ID    H/o pelvic fractures, pins in sacrum, may need removal by Dr. Porter due to persistent pain, will discuss with ID proper scheduling of this    breast cancer bilateral mastectomy status post chemotherapy, treated over 20 years ago per patient, outpatient  follow up    Slurred speech due to brain lesions,    Speech therapy, improvement noted  Dysphagia, dysphagia diet   History of dvt, xarelto  Thrombocytopenia, improved off merrem    Restless leg syndrome, mirapex  Dyslipidemia, pravachol  Essential hypertension, norvasc  Debility, patient resides at a SNF, will need placement on discharge    DNR      Quality-Core Measures   Reviewed items::  Labs reviewed and Medications reviewed  Sue catheter::  No Sue  DVT prophylaxis pharmacological::  Enoxaparin (Lovenox)  Ulcer Prophylaxis::  Yes  Antibiotics:  Treating active infection/contamination beyond 24 hours perioperative coverage  Assessed for rehabilitation services:  Patient was assess for and/or received rehabilitation services during this hospitalization

## 2019-05-15 NOTE — [facility]
Infectious Disease Progress Note    Author: Karen Garcia M.D. Date & Time of service: 5/15/2019  2:52 PM    Chief Complaint:  Brain abscess, iliopsoas abscess, leukopenia     Interval History:   68 y.o. female admitted 4/30/2019. 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  Work-up revealed a right iliopsoas lesion, gluteal abscess, and mult brain abscesses  5/6 Interval 24 hours of Vitals/Labs/Micro,and imaging results reviewed as available. See assessment.   5/8 Interval 24 hours of Vitals/Labs/Micro,and imaging results reviewed as available. See assessment.   5/10 AF WBC 3 continued c/o constipation denies HA, visual changes, neuro deficits  5/11 AF WBC 8.8 isolation stopped due to resolution neutropenia-sleepy today  5/12 AF no CBC somnolent-no acute events noted  5/13 AF WBC 6.2 c/o pain left hip today, unchanged Worse with movement and improved with ice. Refused PT yesterday   5/14 AF c/o dizzyness whenever she tirns on her side-no new HA or visual changes-still havng hip pain  5/15 AF sleeping at time of rounds-by report ambulating  Review of Systems:  Review of Systems   Unable to perform ROS: Other   Constitutional: Negative for fever.   Limited    Hemodynamics:  T98.2 /84 HR77 RR18 O2 sat 92%      Physical Exam:  Physical Exam   Constitutional: No distress.   HENT:   Mouth/Throat: No oropharyngeal exudate.   Eyes: Right eye exhibits no discharge. Left eye exhibits no discharge.   Neck: Neck supple. No JVD present.   Cardiovascular: Normal rate and regular rhythm.    Pulmonary/Chest: Effort normal. No stridor. No respiratory distress.   Abdominal: Soft. Bowel sounds are normal.   Musculoskeletal: She exhibits no edema.   PICC no erythema     Neurological: She displays normal reflexes.   Skin: Skin is warm. No  rash noted. She is not diaphoretic.   Psychiatric: She has a normal mood and affect. Her behavior is normal.   Vitals reviewed.    Labs:  Recent Labs      05/13/19   0545  05/15/19   0530   WBC  6.2  5.1   RBC  4.06*  4.26   HEMOGLOBIN  11.0*  11.5*   HEMATOCRIT  33.2*  34.8*   MCV  81.8  81.7   MCH  27.1  27.0   RDW  59.7*  59.7*   PLATELETCT  208  187   MPV  10.3  10.2   NEUTSPOLYS  56.10   --    LYMPHOCYTES  18.10*   --    MONOCYTES  17.10*   --    EOSINOPHILS  6.10   --    BASOPHILS  1.00   --      Recent Labs      05/13/19   0545  05/14/19   0500  05/15/19   0530   SODIUM  133*  134*  133*   POTASSIUM  3.2*  3.5*  3.6   CHLORIDE  106  107  104   CO2  21  21  21   GLUCOSE  69  79  87   BUN  13  12  11     Recent Labs      05/13/19   0545  05/14/19   0500  05/15/19   0530   CREATININE  0.52  0.56  0.49*       Imaging:  Ct-abdomen-pelvis With    Result Date: 4/19/2019 4/18/2019 7:00 PM HISTORY/REASON FOR EXAM: Post-op infection, abdomen pelvis, known TECHNIQUE/EXAM DESCRIPTION:  CT abdomen and pelvis with contrast. Sequential axial CT images were obtained from lung bases to the proximal femurs after the uneventful administration of 100 cc Omnipaque 350 intravenous contrast. Low dose optimization technique was utilized for this CT exam including automated exposure control and adjustment of the mA and/or kV according to patient size. COMPARISON:  April 8, 2019 CT ABDOMEN FINDINGS: Linear densities are seen within the lung bases, appearance favors atelectasis. Trace pericardial effusion is noted. The liver is normal in contour. The liver is normal in size. No intrahepatic biliary ductal dilatation is noted. The gallbladder appears within normal limits. The spleen is unremarkable. The pancreas is grossly normal. Bilateral adrenal glands appear within normal limits. The kidneys are unremarkable.  The ureters are normal in caliber along their visualized course. The colon appears within normal limits. There is fluid  filled distention of the small bowel is seen. Levoscoliosis of the lumbar spine is seen. Atherosclerotic calcifications are seen. CT PELVIS FINDINGS: The bladder is within normal limits. Postsurgical changes of hysterectomy are noted. Enhancing fluid collection is seen in the right gluteal musculature measuring 2.0 cm. Multiloculated appearing density fluid collection along the right iliacus muscle is  seen measuring approximately 3.4 x 2.0 cm. Enlarged bilateral inguinal lymph nodes are noted. Postsurgical changes of bilateral SI joints and sacral screw fixation are noted. Bony remodeling of right iliac and sacral fractures is observed.     1.  Enhancing fluid collection in the right gluteal muscle, appearance compatible with small abscess. 2.  Loculated enhancing fluid collection in the right iliac is muscle, corresponding with site of prior drain, appearance compatible with reaccumulation of abscess. 3.  Fluid-filled distention of small bowel suggests ileus 4.  Atherosclerosis 5.  Trace pericardial effusion    Ct-abdomen-pelvis With    Result Date: 4/8/2019 4/8/2019 4:24 PM HISTORY/REASON FOR EXAM: Abdominal infection, abdominal pain Metastatic breast cancer TECHNIQUE/EXAM DESCRIPTION:  CT scan of the abdomen and pelvis with contrast. Contrast-enhanced helical scanning was obtained from the diaphragmatic domes through the pubic symphysis following the bolus administration of nonionic contrast without complication. 100 mL of Omnipaque 350 nonionic contrast was administered without complication. Low dose optimization technique was utilized for this CT exam including automated exposure control and adjustment of the mA and/or kV according to patient size. COMPARISON: CT abdomen and pelvis 4/2/2019 FINDINGS: Lung bases: The lung bases are clear. Bilateral breast implants are present. Osseous structures:  Surgical screw traverses both sacroiliac joint. There is fracture of the right ilium. There appears to be  destructive change of the right medial sacrum. This is most consistent with known metastatic disease. Abdomen: The liver is enlarged without focal abnormality. There is no biliary dilation. The spleen is normal in appearance. The pancreas is normal in appearance. The splenic vein and portal vein enhance normally. Both adrenal glands are normal in appearance. The right kidney is normal in appearance. The left kidney is normal in appearance. There is no hydronephrosis. Bowel and mesentery: Within normal limits. The aorta shows atherosclerosis without aneurysm. The terminal ileum is normal in appearance. The appendix is not identified. Pelvis: There is a drainage catheter within or just anterior to the right iliacus muscle. There is no residual associated fluid. Right gluteal drainage catheter has been removed.     1.  Resolution of right iliacus abscess with no associated fluid adjacent to the drainage catheter 2.  Interval removal of right gluteal drainage catheter 3.  Surgical screw traversing both sacroiliac joint with associated pathologic fracture of the right ilium and right medial sacrum 4.  Hepatomegaly    Ct-chest,abdomen,pelvis With    Result Date: 4/23/2019 4/23/2019 8:57 PM HISTORY/REASON FOR EXAM: Breast cancer. Shortness of breath. TECHNIQUE/EXAM DESCRIPTION: CT scan of the chest, abdomen and pelvis with contrast. Thin-section helical scanning was obtained with intravenous contrast from the lung apices through the pubic symphysis to include the chest, abdomen and pelvis. 80 mL of Omnipaque 350 nonionic contrast was administered intravenously without complication. Low dose optimization technique was utilized for this CT exam including automated exposure control and adjustment of the mA and/or kV according to patient size. COMPARISON: None Available FINDINGS: The entire study is degraded by patient respiratory motion artifact. CT CHEST: There is limited evaluation of the lungs due to extensive patient  respiratory motion artifact. There is a linear atelectasis with groundglass opacity bilaterally. There are bilateral breast implants. There are bilateral axillary lymph nodes seen which measure up to 13 mm in short axis dimension bilaterally. There are surgical clips within the axilla. There are small bilateral thyroid nodules. There is no evidence of pericardial effusion. There is moderate atherosclerotic change of the aorta or coronary vessels. CT ABDOMEN: There is fatty change of the liver. The spleen is enlarged. There is dense material layering dependently within the gallbladder consistent with gallstones versus vicarious excretion of contrast. The spleen is normal. The kidneys are normal. The adrenal glands are normal. The pancreas is normal. There are retroperitoneal lymph nodes which measure less than 10 mm in short axis dimension. Similar sized periportal and portacaval lymph nodes are seen as well. CT PELVIS: The appendix is not identified. There is moderate constipation. There is no evidence of bowel obstruction or inflammatory change. There is no evidence of free fluid. There screws extending through the sacrum bilaterally. There is surrounding lucency and fragmentation with fracture extending into the right ilium. There is some fluid seen within the right gluteal and iliac is muscles. The right gluteal fluid collection  measures 18 mm in size and is not significantly changed. The fluid collection within the right iliacis muscle has increased in size now measures 2.7 cm.     1.  Limited evaluation of the thorax due to extensive patient respiratory motion artifact. 2.  Borderline enlarged bilateral axillary lymph nodes. 3.  Linear atelectasis within the lungs with patchy groundglass opacity bilaterally. 4.  Again seen screws extending through the sacrum bilaterally. There is surrounding lucency and fragmentation of the sacrum as well as the right greater than left ilium and a right iliac fracture. It is  uncertain if these represent metastatic lesions with pathologic fracture versus insufficiency fractures. 5.  Fluid collections within the right gluteal and iliacis muscles.. The collection within the right gluteal muscle has unchanged while the fluid collection within the right iliacis muscle is increased somewhat in size. Differential diagnosis includes hematoma as well as abscess. 6.  Enlarged retroperitoneal, periportal and portacaval lymph nodes.    Ct-head With & W/o    Result Date: 4/13/2019 4/13/2019 4:12 PM HISTORY/REASON FOR EXAM:  History of septic emboli, follow-up. TECHNIQUE/EXAM DESCRIPTION AND NUMBER OF VIEWS: CT scan of the head without and with contrast. The study was performed on a helical multidetector CT scanner. Contiguous axial sections were obtained from the skull base through the vertex both prior to and after the administration of IV contrast. 100 mL of Omnipaque 350 nonionic contrast was injected intravenously. Up to date radiation dose reduction adjustments have been utilized to meet ALARA standards for radiation dose reduction. COMPARISON:  CT dated 3/30/2019, 3/17/2019, MR dated 3/7/2019 FINDINGS: There are persistent small hyperenhancing lesions scattered throughout the brain. Largest lesion in the left basal ganglia measures 4.6 mm and demonstrates mild associated edema. Largest lesion in the right cerebral hemisphere measures 4 mm, also with mild associated edema. Several enhancing lesions are also seen in the brainstem. All of the lesions appear stable since prior. No new or enlarging lesions are detected The calvariae and skull base are unremarkable. There are no extraaxial fluid collections. The ventricular system and basal cisterns are within normal limits. There are no hemorrhagic lesions. The paranasal sinuses and mastoids in the field of view are unremarkable. Mild atrophy and chronic small vessel ischemic changes.     1.  No change in scattered hyperenhancing lesions  throughout the brain, cerebellum and visualized brainstem. Some of them demonstrate mild associated edema, similar to prior study. 2.  No CT evidence of infarct or hemorrhage.    Dx-chest-limited (1 View)    Result Date: 4/22/2019 4/22/2019 8:40 PM HISTORY/REASON FOR EXAM:  Shortness of Breath TECHNIQUE/EXAM DESCRIPTION AND NUMBER OF VIEWS: Single portable view of the chest. COMPARISON: 4/16/2019 FINDINGS: HEART: Stable size. LUNGS: No areas of air space disease are demonstrated. PLEURA: No effusion or pneumothorax.     No acute cardiac or pulmonary abnormalities are identified. Lung volumes are low.    Dx-chest-portable (1 View)    Result Date: 4/30/2019 4/30/2019 4:19 PM HISTORY/REASON FOR EXAM:  PICC line placement. IV access necessity. TECHNIQUE/EXAM DESCRIPTION AND NUMBER OF VIEWS: Single portable view of the chest. COMPARISON:  4/22/2019 FINDINGS: A right arm PICC line is present in satisfactory position with its tip projecting over the SVC at the level of the jennifer. The cardiac silhouette is within normal limits. No infiltrates or consolidations are noted. No pleural effusion is noted.     1.  Right arm PICC line tip projects in satisfactory position. 2.  Clear chest.    Dx-chest-portable (1 View)    Result Date: 4/16/2019 4/16/2019 5:43 AM HISTORY/REASON FOR EXAM: Shortness of Breath TECHNIQUE/EXAM DESCRIPTION:  Single AP view of the chest. COMPARISON: March 4, 2019 FINDINGS: Cardiomegaly is observed. The mediastinal contour appears within normal limits.  The central  pulmonary vasculature appears prominent and indistinct. Bilateral lung volumes are diminished.  Diffuse scattered hazy pulmonary parenchymal opacities are seen. No significant pleural effusions are identified. The bony structures appear age-appropriate.     1.  Mild pulmonary edema and/or infiltrate 2.  Cardiomegaly    Mr-brain-with & W/o    Result Date: 4/24/2019 4/24/2019 3:42 PM HISTORY/REASON FOR EXAM:  eval change of brain  abscesses. TECHNIQUE/EXAM DESCRIPTION:   MRI of the brain without and with contrast. T1 sagittal, T2 fast spin-echo axial, T1 coronal, FLAIR coronal, diffusion-weighted and apparent diffusion coefficient (ADC map) axial images were obtained of the whole brain. T1 postcontrast axial and T1 postcontrast coronal images were obtained. The study was performed on a [employer] 1.5 Miranda MRI scanner. 6 mL Gadavist contrast was administered intravenously. COMPARISON:  3/7/2090 FINDINGS:     The post gadolinium sequences demonstrates multifocal areas of nodular enhancement seen throughout the supra and infratentorial brain parenchyma. Some of the lesions demonstrates surrounding T2 hyperintensity. There is no acute infarct. There is no acute or chronic parenchymal hemorrhage. There is no hydrocephalus. Mild cerebral volume loss is seen. There is no extra-axial fluid collection, hemorrhage or mass. The vertebrobasilar system is diminutive in caliber. There are fetal origins of bilateral posterior cerebral arteries. There is no large lesion identified in the expected course of the intracranial portions of the cranial nerves. The skull bones are unremarkable. The paranasal sinuses are clear. The extracranial soft tissue including orbits appear grossly normal.     Multiple punctate enhancing lesions seen throughout the supra and infratentorial brain parenchyma. When compared with the previous MRI there has been interval decrease in the size of the lesions. There are also interval reduction in the extent of the surrounding white matter edema in the restricted diffusion consistent with improvement/treatment response of the most of the multifocal brain abscesses. However there are new enhancing lesions in the left basal ganglia and right cerebellum consistent with interval new abscesses.Follow-up is recommended to ensure the complete resolution of the enhancing lesions. Please note that the radiological differential diagnosis of  this multifocal lesions still includes metastasis. However decrease in the size of the most of the lesions favors the diagnosis of infection.     Ct-cta Chest Pulmonary Artery W/ Recons    Result Date: 4/16/2019 4/16/2019 6:13 AM HISTORY/REASON FOR EXAM:  Shortness of breath TECHNIQUE/EXAM DESCRIPTION:  CT angiogram of the chest with contrast. Transaxial MDCT scan of chest post bolus 55 cc Omnipaque 350 IV administration. MIP reconstructed images were created and reviewed. Low dose optimization technique was utilized for this CT exam including automated exposure control and adjustment of the mA and/or kV according to patient size. COMPARISON:  March 5, 2019 FINDINGS: The visualized portion of the thyroid appear within normal limits.  The trachea and main stem airways are normal in caliber. There are no pathologically enlarged mediastinal lymph nodes. Trace pericardial effusion is seen, otherwise the heart and pericardium appear within normal limits.  Atherosclerotic calcifications are identified.  The pulmonary arteries are well opacified, no large central pulmonary arterial filling defect is appreciated, respiratory motion artifacts limit evaluation of the subsegmental pulmonary arterial branches. No pulmonary consolidation is identified. 7.7 mm medial pleural-based pulmonary nodule is seen on image 140. 6.5 mm pulmonary nodule is seen in the right midlung, best visualized on coronal imaging. Calcified granuloma in the left apex is seen. Limited views of the abdomen demonstrate enlargement of the liver. The bony structures are age appropriate.     1.  No large central pulmonary embolus is appreciated, evaluation of the subsegmental branches is essentially nondiagnostic due to motion artifacts. Additional imaging would be required for definitive exclusion of small distal pulmonary emboli. 2.  Trace pericardial effusion 3.  Hepatomegaly 4.  Atherosclerosis. 5.  Right pulmonary nodules, the largest is a 7.7 mm  "pulmonary nodule, see nodule follow-up recommendations below. Low Risk: CT at 3-6 months, then consider CT at 18-24 months High Risk: CT at 3-6 months, then at 18-24 months Comments: Use most suspicious nodule as guide to management. Follow-up intervals may vary according to size and risk. Low Risk - Minimal or absent history of smoking and of other known risk factors. High Risk - History of smoking or of other known risk factors. Note: These recommendations do not apply to lung cancer screening, patients with immunosuppression, or patients with known primary cancer. Fleischner Society 2017 Guidelines for Management of Incidentally Detected Pulmonary Nodules in Adults     Ir-picc Line Placement W/ Guidance > Age 5    Result Date: 4/29/2019  HISTORY/REASON FOR EXAM:   PICC placement. TECHNIQUE/EXAM DESCRIPTION AND NUMBER OF VIEWS:   PICC line insertion with ultrasound guidance.  The procedure was performed using maximal sterile barrier technique including sterile gown, mask, cap, and donning of sterile gloves following appropriate hand hygiene and/or sterile scrub. Patient skin site was prepped with 2% Chlorhexidine solution. FINDINGS:  PICC line insertion with Ultrasound Guidance was performed by qualified nursing staff without the assistance of a Radiologist. PICC positioning appropriateness confirmed by 3CG technology; chest xray only needed in the instance 3CG unable to confirm placement.              Ultrasound-guided PICC placement performed by qualified nursing staff as above.       Micro:  Results     ** No results found for the last 168 hours. **           ASSESSMENT/PLAN:   Brain abscesses, additional work  Afebrile  Leukopenia resolved  Original abscesses improved on therapy.   MRI 4/23 \"supra and infratentorial brain parenchyma... interval decrease in the size of the lesions. There are also interval reduction in the extent of the surrounding white matter edema in the restricted diffusion consistent with " "improvement/treatment response of the most of the multifocal brain abscesses.  New enhancing lesions in the left basal ganglia and right cerebellum  Mult blood cultures neg  CHAS neg 3/15  DIzzyness likely related to brain abscesses  Continue vancomycin cefepime, flagyl  Repeat MRI this week     Gluteal and iliopsoas abscess, not improved  Adjacent to hardware in right hip (placed 12/17/18)  CT 4/23 \"Fluid collections within the right gluteal and iliacis muscles.. The collection within the right gluteal muscle has unchanged while the fluid collection within the right iliacis muscle is increased somewhat in size.\" 2.7 cm  Cultures 3/29 and 4/4 neg  Still having pain-not much mproved  Repeat MRI this week if feasible to verify resolution-had MRI pelvis 3/29      Encephalopathy-persistent  Multifactorial: meds, brain abscess  Aspiration and fall  precautions    Leukopenia,chronic  On Granix  Resolved today     Diabetes  Keep BS under 150 to help control current infection     H/o breast cancer  Ongoing concern that the lesions in her brain are metastatic in nature     DW IM Dr Mitchell         "

## 2019-05-16 ASSESSMENT — ENCOUNTER SYMPTOMS
SHORTNESS OF BREATH: 0
DIARRHEA: 0
DIZZINESS: 1
PALPITATIONS: 0
CONSTIPATION: 0
FEVER: 0
COUGH: 0
HEADACHES: 0
NAUSEA: 0
WEAKNESS: 1
CHILLS: 0
MYALGIAS: 1
ABDOMINAL PAIN: 0
HEARTBURN: 0

## 2019-05-16 NOTE — [facility]
Infectious Disease Progress Note    Author: Karen Garcia M.D. Date & Time of service: 5/16/2019  11:40 AM    Chief Complaint:  Brain abscess, iliopsoas abscess, leukopenia     Interval History:   68 y.o. female admitted 4/30/2019. 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  Work-up revealed a right iliopsoas lesion, gluteal abscess, and mult brain abscesses  5/6 Interval 24 hours of Vitals/Labs/Micro,and imaging results reviewed as available. See assessment.   5/8 Interval 24 hours of Vitals/Labs/Micro,and imaging results reviewed as available. See assessment.   5/10 AF WBC 3 continued c/o constipation denies HA, visual changes, neuro deficits  5/11 AF WBC 8.8 isolation stopped due to resolution neutropenia-sleepy today  5/12 AF no CBC somnolent-no acute events noted  5/13 AF WBC 6.2 c/o pain left hip today, unchanged Worse with movement and improved with ice. Refused PT yesterday   5/14 AF c/o dizzyness whenever she tirns on her side-no new HA or visual changes-still havng hip pain  5/15 AF sleeping at time of rounds-by report ambulating  5/16 AF weightbearing continues to improve-ambulating more.  No new complaints  Review of Systems:  Review of Systems   Constitutional: Negative for chills and fever.   Musculoskeletal: Positive for joint pain.   Neurological: Positive for dizziness.   All other systems reviewed and are negative.      Hemodynamics:   T97.9 /76 HR71 RR20 O2 sat 96%    Physical Exam:  Physical Exam   Constitutional: No distress.   HENT:   Mouth/Throat: No oropharyngeal exudate.   Eyes: Right eye exhibits no discharge. Left eye exhibits no discharge.   Neck: Neck supple. No JVD present.   Cardiovascular: Normal rate and regular rhythm.    Pulmonary/Chest: Effort normal. No stridor. No respiratory  distress.   Abdominal: Soft. Bowel sounds are normal.   Musculoskeletal: She exhibits no edema.   PICC no erythema     Neurological: She displays normal reflexes.   Skin: Skin is warm. No rash noted. She is not diaphoretic.   Psychiatric: She has a normal mood and affect. Her behavior is normal.   Vitals reviewed.    Labs:  Recent Labs      05/15/19   0530   WBC  5.1   RBC  4.26   HEMOGLOBIN  11.5*   HEMATOCRIT  34.8*   MCV  81.7   MCH  27.0   RDW  59.7*   PLATELETCT  187   MPV  10.2     Recent Labs      05/14/19   0500  05/15/19   0530   SODIUM  134*  133*   POTASSIUM  3.5*  3.6   CHLORIDE  107  104   CO2  21  21   GLUCOSE  79  87   BUN  12  11     Recent Labs      05/14/19   0500  05/15/19   0530   CREATININE  0.56  0.49*       Imaging:  Ct-abdomen-pelvis With    Result Date: 4/19/2019 4/18/2019 7:00 PM HISTORY/REASON FOR EXAM: Post-op infection, abdomen pelvis, known TECHNIQUE/EXAM DESCRIPTION:  CT abdomen and pelvis with contrast. Sequential axial CT images were obtained from lung bases to the proximal femurs after the uneventful administration of 100 cc Omnipaque 350 intravenous contrast. Low dose optimization technique was utilized for this CT exam including automated exposure control and adjustment of the mA and/or kV according to patient size. COMPARISON:  April 8, 2019 CT ABDOMEN FINDINGS: Linear densities are seen within the lung bases, appearance favors atelectasis. Trace pericardial effusion is noted. The liver is normal in contour. The liver is normal in size. No intrahepatic biliary ductal dilatation is noted. The gallbladder appears within normal limits. The spleen is unremarkable. The pancreas is grossly normal. Bilateral adrenal glands appear within normal limits. The kidneys are unremarkable.  The ureters are normal in caliber along their visualized course. The colon appears within normal limits. There is fluid filled distention of the small bowel is seen. Levoscoliosis of the lumbar spine is  seen. Atherosclerotic calcifications are seen. CT PELVIS FINDINGS: The bladder is within normal limits. Postsurgical changes of hysterectomy are noted. Enhancing fluid collection is seen in the right gluteal musculature measuring 2.0 cm. Multiloculated appearing density fluid collection along the right iliacus muscle is  seen measuring approximately 3.4 x 2.0 cm. Enlarged bilateral inguinal lymph nodes are noted. Postsurgical changes of bilateral SI joints and sacral screw fixation are noted. Bony remodeling of right iliac and sacral fractures is observed.     1.  Enhancing fluid collection in the right gluteal muscle, appearance compatible with small abscess. 2.  Loculated enhancing fluid collection in the right iliac is muscle, corresponding with site of prior drain, appearance compatible with reaccumulation of abscess. 3.  Fluid-filled distention of small bowel suggests ileus 4.  Atherosclerosis 5.  Trace pericardial effusion    Ct-abdomen-pelvis With    Result Date: 4/8/2019 4/8/2019 4:24 PM HISTORY/REASON FOR EXAM: Abdominal infection, abdominal pain Metastatic breast cancer TECHNIQUE/EXAM DESCRIPTION:  CT scan of the abdomen and pelvis with contrast. Contrast-enhanced helical scanning was obtained from the diaphragmatic domes through the pubic symphysis following the bolus administration of nonionic contrast without complication. 100 mL of Omnipaque 350 nonionic contrast was administered without complication. Low dose optimization technique was utilized for this CT exam including automated exposure control and adjustment of the mA and/or kV according to patient size. COMPARISON: CT abdomen and pelvis 4/2/2019 FINDINGS: Lung bases: The lung bases are clear. Bilateral breast implants are present. Osseous structures:  Surgical screw traverses both sacroiliac joint. There is fracture of the right ilium. There appears to be destructive change of the right medial sacrum. This is most consistent with known  metastatic disease. Abdomen: The liver is enlarged without focal abnormality. There is no biliary dilation. The spleen is normal in appearance. The pancreas is normal in appearance. The splenic vein and portal vein enhance normally. Both adrenal glands are normal in appearance. The right kidney is normal in appearance. The left kidney is normal in appearance. There is no hydronephrosis. Bowel and mesentery: Within normal limits. The aorta shows atherosclerosis without aneurysm. The terminal ileum is normal in appearance. The appendix is not identified. Pelvis: There is a drainage catheter within or just anterior to the right iliacus muscle. There is no residual associated fluid. Right gluteal drainage catheter has been removed.     1.  Resolution of right iliacus abscess with no associated fluid adjacent to the drainage catheter 2.  Interval removal of right gluteal drainage catheter 3.  Surgical screw traversing both sacroiliac joint with associated pathologic fracture of the right ilium and right medial sacrum 4.  Hepatomegaly    Ct-chest,abdomen,pelvis With    Result Date: 4/23/2019 4/23/2019 8:57 PM HISTORY/REASON FOR EXAM: Breast cancer. Shortness of breath. TECHNIQUE/EXAM DESCRIPTION: CT scan of the chest, abdomen and pelvis with contrast. Thin-section helical scanning was obtained with intravenous contrast from the lung apices through the pubic symphysis to include the chest, abdomen and pelvis. 80 mL of Omnipaque 350 nonionic contrast was administered intravenously without complication. Low dose optimization technique was utilized for this CT exam including automated exposure control and adjustment of the mA and/or kV according to patient size. COMPARISON: None Available FINDINGS: The entire study is degraded by patient respiratory motion artifact. CT CHEST: There is limited evaluation of the lungs due to extensive patient respiratory motion artifact. There is a linear atelectasis with groundglass opacity  bilaterally. There are bilateral breast implants. There are bilateral axillary lymph nodes seen which measure up to 13 mm in short axis dimension bilaterally. There are surgical clips within the axilla. There are small bilateral thyroid nodules. There is no evidence of pericardial effusion. There is moderate atherosclerotic change of the aorta or coronary vessels. CT ABDOMEN: There is fatty change of the liver. The spleen is enlarged. There is dense material layering dependently within the gallbladder consistent with gallstones versus vicarious excretion of contrast. The spleen is normal. The kidneys are normal. The adrenal glands are normal. The pancreas is normal. There are retroperitoneal lymph nodes which measure less than 10 mm in short axis dimension. Similar sized periportal and portacaval lymph nodes are seen as well. CT PELVIS: The appendix is not identified. There is moderate constipation. There is no evidence of bowel obstruction or inflammatory change. There is no evidence of free fluid. There screws extending through the sacrum bilaterally. There is surrounding lucency and fragmentation with fracture extending into the right ilium. There is some fluid seen within the right gluteal and iliac is muscles. The right gluteal fluid collection  measures 18 mm in size and is not significantly changed. The fluid collection within the right iliacis muscle has increased in size now measures 2.7 cm.     1.  Limited evaluation of the thorax due to extensive patient respiratory motion artifact. 2.  Borderline enlarged bilateral axillary lymph nodes. 3.  Linear atelectasis within the lungs with patchy groundglass opacity bilaterally. 4.  Again seen screws extending through the sacrum bilaterally. There is surrounding lucency and fragmentation of the sacrum as well as the right greater than left ilium and a right iliac fracture. It is uncertain if these represent metastatic lesions with pathologic fracture versus  insufficiency fractures. 5.  Fluid collections within the right gluteal and iliacis muscles.. The collection within the right gluteal muscle has unchanged while the fluid collection within the right iliacis muscle is increased somewhat in size. Differential diagnosis includes hematoma as well as abscess. 6.  Enlarged retroperitoneal, periportal and portacaval lymph nodes.    Ct-head With & W/o    Result Date: 4/13/2019 4/13/2019 4:12 PM HISTORY/REASON FOR EXAM:  History of septic emboli, follow-up. TECHNIQUE/EXAM DESCRIPTION AND NUMBER OF VIEWS: CT scan of the head without and with contrast. The study was performed on a helical multidetector CT scanner. Contiguous axial sections were obtained from the skull base through the vertex both prior to and after the administration of IV contrast. 100 mL of Omnipaque 350 nonionic contrast was injected intravenously. Up to date radiation dose reduction adjustments have been utilized to meet ALARA standards for radiation dose reduction. COMPARISON:  CT dated 3/30/2019, 3/17/2019, MR dated 3/7/2019 FINDINGS: There are persistent small hyperenhancing lesions scattered throughout the brain. Largest lesion in the left basal ganglia measures 4.6 mm and demonstrates mild associated edema. Largest lesion in the right cerebral hemisphere measures 4 mm, also with mild associated edema. Several enhancing lesions are also seen in the brainstem. All of the lesions appear stable since prior. No new or enlarging lesions are detected The calvariae and skull base are unremarkable. There are no extraaxial fluid collections. The ventricular system and basal cisterns are within normal limits. There are no hemorrhagic lesions. The paranasal sinuses and mastoids in the field of view are unremarkable. Mild atrophy and chronic small vessel ischemic changes.     1.  No change in scattered hyperenhancing lesions throughout the brain, cerebellum and visualized brainstem. Some of them demonstrate mild  associated edema, similar to prior study. 2.  No CT evidence of infarct or hemorrhage.    Dx-chest-limited (1 View)    Result Date: 4/22/2019 4/22/2019 8:40 PM HISTORY/REASON FOR EXAM:  Shortness of Breath TECHNIQUE/EXAM DESCRIPTION AND NUMBER OF VIEWS: Single portable view of the chest. COMPARISON: 4/16/2019 FINDINGS: HEART: Stable size. LUNGS: No areas of air space disease are demonstrated. PLEURA: No effusion or pneumothorax.     No acute cardiac or pulmonary abnormalities are identified. Lung volumes are low.    Dx-chest-portable (1 View)    Result Date: 4/30/2019 4/30/2019 4:19 PM HISTORY/REASON FOR EXAM:  PICC line placement. IV access necessity. TECHNIQUE/EXAM DESCRIPTION AND NUMBER OF VIEWS: Single portable view of the chest. COMPARISON:  4/22/2019 FINDINGS: A right arm PICC line is present in satisfactory position with its tip projecting over the SVC at the level of the jennifer. The cardiac silhouette is within normal limits. No infiltrates or consolidations are noted. No pleural effusion is noted.     1.  Right arm PICC line tip projects in satisfactory position. 2.  Clear chest.    Dx-chest-portable (1 View)    Result Date: 4/16/2019 4/16/2019 5:43 AM HISTORY/REASON FOR EXAM: Shortness of Breath TECHNIQUE/EXAM DESCRIPTION:  Single AP view of the chest. COMPARISON: March 4, 2019 FINDINGS: Cardiomegaly is observed. The mediastinal contour appears within normal limits.  The central  pulmonary vasculature appears prominent and indistinct. Bilateral lung volumes are diminished.  Diffuse scattered hazy pulmonary parenchymal opacities are seen. No significant pleural effusions are identified. The bony structures appear age-appropriate.     1.  Mild pulmonary edema and/or infiltrate 2.  Cardiomegaly    Mr-brain-with & W/o    Result Date: 4/24/2019 4/24/2019 3:42 PM HISTORY/REASON FOR EXAM:  eval change of brain abscesses. TECHNIQUE/EXAM DESCRIPTION:   MRI of the brain without and with contrast. T1 sagittal,  T2 fast spin-echo axial, T1 coronal, FLAIR coronal, diffusion-weighted and apparent diffusion coefficient (ADC map) axial images were obtained of the whole brain. T1 postcontrast axial and T1 postcontrast coronal images were obtained. The study was performed on a [employer] Signa 1.5 Miranda MRI scanner. 6 mL Gadavist contrast was administered intravenously. COMPARISON:  3/7/2090 FINDINGS:     The post gadolinium sequences demonstrates multifocal areas of nodular enhancement seen throughout the supra and infratentorial brain parenchyma. Some of the lesions demonstrates surrounding T2 hyperintensity. There is no acute infarct. There is no acute or chronic parenchymal hemorrhage. There is no hydrocephalus. Mild cerebral volume loss is seen. There is no extra-axial fluid collection, hemorrhage or mass. The vertebrobasilar system is diminutive in caliber. There are fetal origins of bilateral posterior cerebral arteries. There is no large lesion identified in the expected course of the intracranial portions of the cranial nerves. The skull bones are unremarkable. The paranasal sinuses are clear. The extracranial soft tissue including orbits appear grossly normal.     Multiple punctate enhancing lesions seen throughout the supra and infratentorial brain parenchyma. When compared with the previous MRI there has been interval decrease in the size of the lesions. There are also interval reduction in the extent of the surrounding white matter edema in the restricted diffusion consistent with improvement/treatment response of the most of the multifocal brain abscesses. However there are new enhancing lesions in the left basal ganglia and right cerebellum consistent with interval new abscesses.Follow-up is recommended to ensure the complete resolution of the enhancing lesions. Please note that the radiological differential diagnosis of this multifocal lesions still includes metastasis. However decrease in the size of the most of the  lesions favors the diagnosis of infection.     Ct-cta Chest Pulmonary Artery W/ Recons    Result Date: 4/16/2019 4/16/2019 6:13 AM HISTORY/REASON FOR EXAM:  Shortness of breath TECHNIQUE/EXAM DESCRIPTION:  CT angiogram of the chest with contrast. Transaxial MDCT scan of chest post bolus 55 cc Omnipaque 350 IV administration. MIP reconstructed images were created and reviewed. Low dose optimization technique was utilized for this CT exam including automated exposure control and adjustment of the mA and/or kV according to patient size. COMPARISON:  March 5, 2019 FINDINGS: The visualized portion of the thyroid appear within normal limits.  The trachea and main stem airways are normal in caliber. There are no pathologically enlarged mediastinal lymph nodes. Trace pericardial effusion is seen, otherwise the heart and pericardium appear within normal limits.  Atherosclerotic calcifications are identified.  The pulmonary arteries are well opacified, no large central pulmonary arterial filling defect is appreciated, respiratory motion artifacts limit evaluation of the subsegmental pulmonary arterial branches. No pulmonary consolidation is identified. 7.7 mm medial pleural-based pulmonary nodule is seen on image 140. 6.5 mm pulmonary nodule is seen in the right midlung, best visualized on coronal imaging. Calcified granuloma in the left apex is seen. Limited views of the abdomen demonstrate enlargement of the liver. The bony structures are age appropriate.     1.  No large central pulmonary embolus is appreciated, evaluation of the subsegmental branches is essentially nondiagnostic due to motion artifacts. Additional imaging would be required for definitive exclusion of small distal pulmonary emboli. 2.  Trace pericardial effusion 3.  Hepatomegaly 4.  Atherosclerosis. 5.  Right pulmonary nodules, the largest is a 7.7 mm pulmonary nodule, see nodule follow-up recommendations below. Low Risk: CT at 3-6 months, then consider  "CT at 18-24 months High Risk: CT at 3-6 months, then at 18-24 months Comments: Use most suspicious nodule as guide to management. Follow-up intervals may vary according to size and risk. Low Risk - Minimal or absent history of smoking and of other known risk factors. High Risk - History of smoking or of other known risk factors. Note: These recommendations do not apply to lung cancer screening, patients with immunosuppression, or patients with known primary cancer. Fleischner Society 2017 Guidelines for Management of Incidentally Detected Pulmonary Nodules in Adults     Ir-picc Line Placement W/ Guidance > Age 5    Result Date: 4/29/2019  HISTORY/REASON FOR EXAM:   PICC placement. TECHNIQUE/EXAM DESCRIPTION AND NUMBER OF VIEWS:   PICC line insertion with ultrasound guidance.  The procedure was performed using maximal sterile barrier technique including sterile gown, mask, cap, and donning of sterile gloves following appropriate hand hygiene and/or sterile scrub. Patient skin site was prepped with 2% Chlorhexidine solution. FINDINGS:  PICC line insertion with Ultrasound Guidance was performed by qualified nursing staff without the assistance of a Radiologist. PICC positioning appropriateness confirmed by 3CG technology; chest xray only needed in the instance 3CG unable to confirm placement.              Ultrasound-guided PICC placement performed by qualified nursing staff as above.       Micro:  Results     ** No results found for the last 168 hours. **           ASSESSMENT/PLAN:   Brain abscesses, additional work  Afebrile  Leukopenia resolved  Original abscesses improved on therapy.   MRI 4/23 \"supra and infratentorial brain parenchyma... interval decrease in the size of the lesions. There are also interval reduction in the extent of the surrounding white matter edema in the restricted diffusion consistent with improvement/treatment response of the most of the multifocal brain abscesses.  New enhancing lesions in " "the left basal ganglia and right cerebellum  Mult blood cultures neg  CHAS neg 3/15  DIzzyness likely related to brain abscesses  Continue vancomycin cefepime, flagyl  Repeat MRI this week     Gluteal and iliopsoas abscess, on treatment  Adjacent to hardware in right hip (placed 12/17/18)  CT 4/23 \"Fluid collections within the right gluteal and iliacis muscles.. The collection within the right gluteal muscle has unchanged while the fluid collection within the right iliacis muscle is increased somewhat in size.\" 2.7 cm  Cultures 3/29 and 4/4 neg  Improved pain control-able to ambulate  Repeat MRI this week if feasible to verify resolution-had MRI pelvis 3/29  Possible removal of hardware per Ortho      Encephalopathy-improved  Multifactorial: meds, brain abscess  Aspiration and fall  precautions     Diabetes  Keep BS under 150 to help control current infection     H/o breast cancer  Ongoing concern that the lesions in her brain are metastatic in nature     DW IM Dr Mitchell    I have performed a physical exam and reviewed and updated ROS as of today.  In review of yesterday's note dated  5/15/2019, there are no changes except as documented above.          "

## 2019-05-16 NOTE — [facility]
Hospital Medicine Progress Note, Adult, Complex               Author: Odalys Mitchell Date & Time created: 5/16/2019  8:16 AM     CC: brain abscesses    Interval History:  This is a 68 y.o. female here with history of , history of COPD, diabetes, hypertension, depression, anemia right hip pain, fever and abnormal brain mri. lesions on MRI showed improvement with antibiotics so this is continued for a total of 6 weeks.  Today she is excited to report that she ambulated over 200 feet with therapy    Review of Systems:  Review of Systems   Constitutional: Negative for chills and fever.   HENT: Negative for hearing loss.    Respiratory: Negative for cough and shortness of breath.    Cardiovascular: Negative for chest pain, palpitations and leg swelling.   Gastrointestinal: Negative for abdominal pain, constipation, diarrhea, heartburn and nausea.   Genitourinary: Negative for dysuria and hematuria.   Musculoskeletal: Positive for myalgias.        Hip pain improving  Long toenails   Skin: Negative for itching.   Neurological: Positive for weakness. Negative for headaches.       Physical Exam:  Physical Exam   Constitutional: She is oriented to person, place, and time. No distress.   HENT:   Mouth/Throat: Oropharynx is clear and moist. No oropharyngeal exudate.   Eyes: Pupils are equal, round, and reactive to light. Right eye exhibits no discharge. Left eye exhibits no discharge. No scleral icterus.   Neck: No tracheal deviation present.   Cardiovascular: Normal rate and regular rhythm.  PMI is displaced.    Pulmonary/Chest: Effort normal and breath sounds normal. No stridor.   Abdominal: Soft. She exhibits no distension. There is no tenderness.   Musculoskeletal: She exhibits no edema.   Neurological: She is alert and oriented to person, place, and time.   Skin: Skin is warm and dry. No rash noted. She is not diaphoretic. No erythema.   Vitals reviewed.      Labs:          Recent Labs      05/14/19   0500  05/15/19   0530    SODIUM  134*  133*   POTASSIUM  3.5*  3.6   CHLORIDE  107  104   CO2  21  21   BUN  12  11   CREATININE  0.56  0.49*   MAGNESIUM  1.6   --    CALCIUM  8.2*  8.7     Recent Labs      05/14/19   0500  05/15/19   0530   GLUCOSE  79  87     Recent Labs      05/15/19   0530   RBC  4.26   HEMOGLOBIN  11.5*   HEMATOCRIT  34.8*   PLATELETCT  187     Recent Labs      05/15/19   0530   WBC  5.1           Hemodynamics:   T97.9 /76 HR71 RR20 O2 sat 96%     GI/Nutrition:  Orders Placed This Encounter   Procedures   • Diet Order Diabetic     Standing Status:   Standing     Number of Occurrences:   1     Order Specific Question:   Diet:     Answer:   Diabetic [3]     Order Specific Question:   Texture/Fiber modifications:     Answer:   Dysphagia 3(Mechanical Soft)specify fluid consistency(question 6) [3]     Order Specific Question:   Consistency/Fluid modifications:     Answer:   Thin Liquids [3]     Medical Decision Making, by Problem:  Brain abscesses on MRI with improvement on antibiotics   Repeat MRI 5/20 to evaluate brain abscesses    ID following, cefepime, flagyl and vancomycin    No endocarditis on CHAS    Pelvic abscess resolved on repeat imaging, drain removed   May repeat imaging per ID    H/o pelvic fractures, pins in sacrum, may need removal by Dr. Porter in the future, continue PT   Mobility improving    breast cancer bilateral mastectomy status post chemotherapy, treated over 20 years ago per patient, outpatient follow up    Slurred speech due to brain lesions,    Speech therapy, speech clearer  Dysphagia, dysphagia diet   History of dvt, xarelto  Thrombocytopenia, improved off merrem  Thick toenails, will request podiatry for trimming    Restless leg syndrome, mirapex  Dyslipidemia, pravachol  Essential hypertension, norvasc  Debility, patient resides at a SNF, will need placement on discharge    DNR      Quality-Core Measures   Reviewed items::  Labs reviewed and Medications reviewed  Sue catheter::   No Sue  DVT prophylaxis pharmacological::  Enoxaparin (Lovenox)  Ulcer Prophylaxis::  Yes  Antibiotics:  Treating active infection/contamination beyond 24 hours perioperative coverage  Assessed for rehabilitation services:  Patient was assess for and/or received rehabilitation services during this hospitalization

## 2019-05-17 ASSESSMENT — ENCOUNTER SYMPTOMS
SHORTNESS OF BREATH: 0
FEVER: 0
ABDOMINAL PAIN: 0
MYALGIAS: 1
HEARTBURN: 0
NAUSEA: 0
PALPITATIONS: 0
COUGH: 0
DIAPHORESIS: 0
HEADACHES: 0
WEAKNESS: 1
CONSTIPATION: 0

## 2019-05-17 NOTE — [facility]
Hospital Medicine Progress Note, Adult, Complex               Author: Odalys Mitchell Date & Time created: 5/17/2019  9:46 AM     CC: brain abscesses    Interval History:  This is a 68 y.o. female here with history of , history of COPD, diabetes, hypertension, depression, anemia right hip pain, fever and abnormal brain mri. lesions on MRI showed improvement with antibiotics so this is continued for a total of 6 weeks.  The patient is ambulating farther distances with therapy    Review of Systems:  Review of Systems   Constitutional: Negative for diaphoresis and fever.        Weakness is improving   Respiratory: Negative for cough and shortness of breath.    Cardiovascular: Negative for chest pain, palpitations and leg swelling.   Gastrointestinal: Negative for abdominal pain, constipation, heartburn and nausea.   Genitourinary: Negative for frequency, hematuria and urgency.   Musculoskeletal: Positive for myalgias.   Skin: Negative for itching and rash.   Neurological: Positive for weakness. Negative for headaches.       Physical Exam:  Physical Exam   Constitutional: She is oriented to person, place, and time. No distress.   HENT:   Mouth/Throat: Oropharynx is clear and moist. No oropharyngeal exudate.   Eyes: Pupils are equal, round, and reactive to light. Right eye exhibits no discharge. Left eye exhibits no discharge. No scleral icterus.   Neck: Neck supple.   Cardiovascular: Normal rate and regular rhythm.  PMI is displaced.    Pulmonary/Chest: Effort normal. No stridor. She has no wheezes. She has no rales.   Abdominal: Soft. She exhibits no distension. There is no tenderness.   Musculoskeletal: She exhibits no edema.   Neurological: She is alert and oriented to person, place, and time.   Skin: Skin is warm and dry. No rash noted. She is not diaphoretic. No erythema.   Vitals reviewed.      Labs:          Recent Labs      05/15/19   0530   SODIUM  133*   POTASSIUM  3.6   CHLORIDE  104   CO2  21   BUN  11    CREATININE  0.49*   CALCIUM  8.7     Recent Labs      05/15/19   0530   GLUCOSE  87     Recent Labs      05/15/19   0530   RBC  4.26   HEMOGLOBIN  11.5*   HEMATOCRIT  34.8*   PLATELETCT  187     Recent Labs      05/15/19   0530   WBC  5.1           Hemodynamics:   T98 /66 HR64 RR20 O2 sat 96%     GI/Nutrition:  Orders Placed This Encounter   Procedures   • Diet Order Diabetic     Standing Status:   Standing     Number of Occurrences:   1     Order Specific Question:   Diet:     Answer:   Diabetic [3]     Order Specific Question:   Texture/Fiber modifications:     Answer:   Dysphagia 3(Mechanical Soft)specify fluid consistency(question 6) [3]     Order Specific Question:   Consistency/Fluid modifications:     Answer:   Thin Liquids [3]     Medical Decision Making, by Problem:  Brain abscesses on MRI with improvement on antibiotics   Repeat MRI 5/20 to evaluate brain abscesses    ID following, continuecefepime, flagyl and vancomycin and monitor renal function and cell counts   No endocarditis on CHAS    Pelvic abscess resolved on repeat imaging, drain removed   May repeat imaging 5/20    H/o pelvic fractures, pins in sacrum, may need removal by Dr. Porter in the future   Mobility improving with therapy    breast cancer bilateral mastectomy status post chemotherapy, treated over 20 years ago per patient, outpatient follow up    Slurred speech due to brain lesions,    Speech therapy, speech clearer  Dysphagia, oral diet   History of dvt, xarelto  Thrombocytopenia, improved off merrem  Thick toenails, podiatry consult appreciated    Restless leg syndrome, mirapex  Dyslipidemia, pravachol  Essential hypertension, norvasc  Debility, patient resides at a SNF, will need placement on discharge    DNR      Quality-Core Measures   Reviewed items::  Labs reviewed and Medications reviewed  Sue catheter::  No Sue  DVT prophylaxis pharmacological::  Enoxaparin (Lovenox)  Ulcer Prophylaxis::  Yes  Antibiotics:  Treating  active infection/contamination beyond 24 hours perioperative coverage  Assessed for rehabilitation services:  Patient was assess for and/or received rehabilitation services during this hospitalization

## 2019-05-18 LAB — VANCOMYCIN TROUGH SERPL-MCNC: 15.6 UG/ML (ref 10–20)

## 2019-05-18 PROCEDURE — 80202 ASSAY OF VANCOMYCIN: CPT

## 2019-05-18 ASSESSMENT — ENCOUNTER SYMPTOMS
HEARTBURN: 0
NAUSEA: 0
VOMITING: 0
HEADACHES: 0
WHEEZING: 0
COUGH: 0
SHORTNESS OF BREATH: 0
WEAKNESS: 1
ABDOMINAL PAIN: 0
MYALGIAS: 1
CHILLS: 0
CONSTIPATION: 0
FEVER: 0

## 2019-05-18 NOTE — [facility]
Hospital Medicine Progress Note, Adult, Complex               Author: Odalysmely Braswellugh Date & Time created: 5/18/2019  10:00 AM     CC: brain abscesses    Interval History:  This is a 68 y.o. female here with history of , history of COPD, diabetes, hypertension, depression, anemia right hip pain, fever and abnormal brain mri. lesions on MRI showed improvement with antibiotics so this is continued for a total of 6 weeks.  She is knitting and proud of her project, she has been walking farther distances with therapy    Review of Systems:  Review of Systems   Constitutional: Negative for chills and fever.        Weakness is improving   Respiratory: Negative for cough, shortness of breath and wheezing.    Cardiovascular: Negative for chest pain and leg swelling.   Gastrointestinal: Negative for abdominal pain, constipation, heartburn, nausea and vomiting.   Genitourinary: Negative for dysuria.   Musculoskeletal: Positive for myalgias.   Skin: Negative for itching.   Neurological: Positive for weakness. Negative for headaches.       Physical Exam:  Physical Exam   Constitutional: She is oriented to person, place, and time. No distress.   HENT:   Mouth/Throat: No oropharyngeal exudate.   Eyes: Pupils are equal, round, and reactive to light. Conjunctivae are normal. No scleral icterus.   Neck: Neck supple.   Cardiovascular: Normal rate and regular rhythm.  PMI is displaced.    Pulmonary/Chest: Effort normal. No stridor. She has no wheezes. She has no rales.   Abdominal: Soft. Bowel sounds are normal. She exhibits no distension. There is no tenderness.   Musculoskeletal: She exhibits no edema.   Neurological: She is alert and oriented to person, place, and time.   Skin: Skin is warm. No rash noted. She is not diaphoretic.   Vitals reviewed.      Labs:          No results for input(s): SODIUM, POTASSIUM, CHLORIDE, CO2, BUN, CREATININE, MAGNESIUM, PHOSPHORUS, CALCIUM in the last 72 hours.  No results for input(s): ALTSGPT,  ASTSGOT, ALKPHOSPHAT, TBILIRUBIN, DBILIRUBIN, GAMMAGT, AMYLASE, LIPASE, ALB, PREALBUMIN, GLUCOSE in the last 72 hours.  No results for input(s): RBC, HEMOGLOBIN, HEMATOCRIT, PLATELETCT, PROTHROMBTM, APTT, INR, IRON, FERRITIN, TOTIRONBC in the last 72 hours.            Hemodynamics:   T98.2 /73 HR58 RR18 O2 sat 91%     GI/Nutrition:  Orders Placed This Encounter   Procedures   • Diet Order Diabetic     Standing Status:   Standing     Number of Occurrences:   1     Order Specific Question:   Diet:     Answer:   Diabetic [3]     Order Specific Question:   Texture/Fiber modifications:     Answer:   Dysphagia 3(Mechanical Soft)specify fluid consistency(question 6) [3]     Order Specific Question:   Consistency/Fluid modifications:     Answer:   Thin Liquids [3]     Medical Decision Making, by Problem:  Brain abscesses on MRI with improvement on antibiotics   Repeat MRI 5/20 to evaluate brain abscesses    ID following, continue cefepime, flagyl and vancomycin     No endocarditis on imaging    Pelvic abscess resolved on repeat imaging, drain removed   MRI 5/20 per ID request    H/o pelvic fractures, pins in sacrum, may need removal by Dr. Porter in the future   Mobility improving, continue therapy    breast cancer bilateral mastectomy status post chemotherapy, treated over 20 years ago per patient, outpatient follow up    Slurred speech due to brain lesions,    Speech therapy, improved  Dysphagia, oral diet   History of dvt, xarelto  Thrombocytopenia, improved off merrem  Thick toenails, podiatry consult appreciated    Restless leg syndrome, mirapex  Dyslipidemia, pravachol  Essential hypertension, norvasc  Debility, patient resides at a SNF, will need placement on discharge    DNR      Quality-Core Measures   Reviewed items::  Labs reviewed and Medications reviewed  Sue catheter::  No Use  DVT prophylaxis pharmacological::  Enoxaparin (Lovenox)  Ulcer Prophylaxis::  Yes  Antibiotics:  Treating active  infection/contamination beyond 24 hours perioperative coverage  Assessed for rehabilitation services:  Patient was assess for and/or received rehabilitation services during this hospitalization

## 2019-05-19 ASSESSMENT — ENCOUNTER SYMPTOMS
ABDOMINAL PAIN: 0
FEVER: 0
WHEEZING: 0
HEADACHES: 0
HEARTBURN: 0
CONSTIPATION: 0
NAUSEA: 0
COUGH: 0
DIAPHORESIS: 0
MYALGIAS: 1
WEAKNESS: 1

## 2019-05-19 NOTE — [facility]
Hospital Medicine Progress Note, Adult, Complex               Author: Odalys Mitchell Date & Time created: 5/19/2019  1:00 PM     CC: brain abscesses    Interval History:  This is a 68 y.o. female here with history of , history of COPD, diabetes, hypertension, depression, anemia right hip pain, fever and abnormal brain mri. lesions on MRI showed improvement with antibiotics so this is continued for a total of 6 weeks.  The patient is ambulating farther but still is resistant to sitting or using the bedside comode due to pain with sitting    Review of Systems:  Review of Systems   Constitutional: Negative for diaphoresis and fever.        Weakness is improving   Respiratory: Negative for cough and wheezing.    Cardiovascular: Negative for chest pain and leg swelling.   Gastrointestinal: Negative for abdominal pain, constipation, heartburn and nausea.   Genitourinary: Negative for dysuria and urgency.   Musculoskeletal: Positive for myalgias.        Pain in low back with sitting only   Skin: Negative for itching.   Neurological: Positive for weakness. Negative for headaches.       Physical Exam:  Physical Exam   Constitutional: She is oriented to person, place, and time. No distress.   HENT:   Mouth/Throat: No oropharyngeal exudate.   Eyes: Pupils are equal, round, and reactive to light. Conjunctivae and EOM are normal.   Neck: Neck supple. No tracheal deviation present.   Cardiovascular: Normal rate and regular rhythm.  PMI is displaced.    Pulmonary/Chest: Effort normal. She has no wheezes. She has no rales.   Abdominal: Bowel sounds are normal. There is no tenderness.   Musculoskeletal: She exhibits no edema.   Neurological: She is alert and oriented to person, place, and time.   Skin: Skin is warm and dry. No rash noted. She is not diaphoretic. No erythema.   Vitals reviewed.      Labs:          No results for input(s): SODIUM, POTASSIUM, CHLORIDE, CO2, BUN, CREATININE, MAGNESIUM, PHOSPHORUS, CALCIUM in the last 72  hours.  No results for input(s): ALTSGPT, ASTSGOT, ALKPHOSPHAT, TBILIRUBIN, DBILIRUBIN, GAMMAGT, AMYLASE, LIPASE, ALB, PREALBUMIN, GLUCOSE in the last 72 hours.  No results for input(s): RBC, HEMOGLOBIN, HEMATOCRIT, PLATELETCT, PROTHROMBTM, APTT, INR, IRON, FERRITIN, TOTIRONBC in the last 72 hours.            Hemodynamics:   T97.6 /72 HR91 RR18 O2 sat 93%     GI/Nutrition:  Orders Placed This Encounter   Procedures   • Diet Order Diabetic     Standing Status:   Standing     Number of Occurrences:   1     Order Specific Question:   Diet:     Answer:   Diabetic [3]     Order Specific Question:   Texture/Fiber modifications:     Answer:   Dysphagia 3(Mechanical Soft)specify fluid consistency(question 6) [3]     Order Specific Question:   Consistency/Fluid modifications:     Answer:   Thin Liquids [3]     Medical Decision Making, by Problem:  Brain abscesses on MRI with improvement on antibiotics   Repeat MRI 5/20 tomorrow to look at abscesses   ID following, continue cefepime, flagyl and vancomycin     No endocarditis on imaging    Pelvic abscess resolved on repeat imaging, drain removed   MRI 5/20 per ID request    H/o pelvic fractures, pins in sacrum, may need removal by Dr. Porter in the future, previous note from Dr. Porter recommends 6 months from December, will place call tomorrow to have scheduled   Mobility improving, continue therapy    breast cancer bilateral mastectomy status post chemotherapy, treated over 20 years ago per patient, outpatient follow up    Slurred speech due to brain lesions,    Speech therapy, speech is improved  Dysphagia, oral diet   History of dvt, xarelto  Thrombocytopenia, improved off merrem  Thick toenails, podiatry trimming done    Restless leg syndrome, mirapex  Dyslipidemia, pravachol  Essential hypertension, norvasc  Debility, patient resides at a SNF, will need placement on discharge    DNR      Quality-Core Measures   Reviewed items::  Labs reviewed and Medications  reviewed  Sue catheter::  No Sue  DVT prophylaxis pharmacological::  Enoxaparin (Lovenox)  Ulcer Prophylaxis::  Yes  Antibiotics:  Treating active infection/contamination beyond 24 hours perioperative coverage  Assessed for rehabilitation services:  Patient was assess for and/or received rehabilitation services during this hospitalization

## 2019-05-20 ENCOUNTER — APPOINTMENT (OUTPATIENT)
Dept: RADIOLOGY | Facility: MEDICAL CENTER | Age: 69
End: 2019-05-20
Attending: INTERNAL MEDICINE
Payer: MEDICARE

## 2019-05-20 PROCEDURE — 72197 MRI PELVIS W/O & W/DYE: CPT

## 2019-05-20 PROCEDURE — 700117 HCHG RX CONTRAST REV CODE 255: Performed by: INTERNAL MEDICINE

## 2019-05-20 PROCEDURE — A9585 GADOBUTROL INJECTION: HCPCS | Performed by: INTERNAL MEDICINE

## 2019-05-20 RX ORDER — GADOBUTROL 604.72 MG/ML
6 INJECTION INTRAVENOUS ONCE
Status: COMPLETED | OUTPATIENT
Start: 2019-05-20 | End: 2019-05-20

## 2019-05-20 RX ADMIN — GADOBUTROL 6 ML: 604.72 INJECTION INTRAVENOUS at 18:03

## 2019-05-20 ASSESSMENT — ENCOUNTER SYMPTOMS
DIZZINESS: 0
CHILLS: 0
CONSTIPATION: 0
DIARRHEA: 0
PALPITATIONS: 0
WEAKNESS: 1
ABDOMINAL PAIN: 0
SHORTNESS OF BREATH: 0
MYALGIAS: 1
WHEEZING: 0
FEVER: 0
HEADACHES: 0
HEARTBURN: 0

## 2019-05-20 NOTE — [facility]
Hospital Medicine Progress Note, Adult, Complex               Author: Odalys Mitchell Date & Time created: 5/20/2019  10:01 AM     CC: brain abscesses    Interval History:  This is a 68 y.o. female here with history of , history of COPD, diabetes, hypertension, depression, anemia right hip pain, fever and abnormal brain mri. lesions on MRI showed improvement with antibiotics so this is continued for a total of 6 weeks.  The patient declines to use the bedside comode due to pain with sitting, she is ambulating over 200 feet    Review of Systems:  Review of Systems   Constitutional: Negative for chills and fever.        Weakness is improving   Respiratory: Negative for shortness of breath and wheezing.    Cardiovascular: Negative for chest pain, palpitations and leg swelling.   Gastrointestinal: Negative for abdominal pain, constipation, diarrhea and heartburn.   Genitourinary: Negative for dysuria.   Musculoskeletal: Positive for myalgias.        Pain in low back and pelvis with sitting only   Skin: Negative for rash.   Neurological: Positive for weakness. Negative for headaches.       Physical Exam:  Physical Exam   Constitutional: She is oriented to person, place, and time.   HENT:   Mouth/Throat: Oropharynx is clear and moist. No oropharyngeal exudate.   Eyes: Pupils are equal, round, and reactive to light. EOM are normal. No scleral icterus.   Neck: Neck supple. No tracheal deviation present.   Cardiovascular: Normal rate and regular rhythm.  PMI is displaced.    No murmur heard.  Pulmonary/Chest: Effort normal. She has no wheezes. She has no rales.   Abdominal: Soft. Bowel sounds are normal.   Musculoskeletal: She exhibits no edema.   Neurological: She is alert and oriented to person, place, and time.   Skin: Skin is warm. No rash noted. She is not diaphoretic.   Psychiatric: Her behavior is normal.   Vitals reviewed.      Labs:          No results for input(s): SODIUM, POTASSIUM, CHLORIDE, CO2, BUN, CREATININE,  MAGNESIUM, PHOSPHORUS, CALCIUM in the last 72 hours.  No results for input(s): ALTSGPT, ASTSGOT, ALKPHOSPHAT, TBILIRUBIN, DBILIRUBIN, GAMMAGT, AMYLASE, LIPASE, ALB, PREALBUMIN, GLUCOSE in the last 72 hours.  No results for input(s): RBC, HEMOGLOBIN, HEMATOCRIT, PLATELETCT, PROTHROMBTM, APTT, INR, IRON, FERRITIN, TOTIRONBC in the last 72 hours.            Hemodynamics:   T98 /72 HR74 RR16 O2 sat 92%     GI/Nutrition:  Orders Placed This Encounter   Procedures   • Diet Order Diabetic     Standing Status:   Standing     Number of Occurrences:   1     Order Specific Question:   Diet:     Answer:   Diabetic [3]     Order Specific Question:   Texture/Fiber modifications:     Answer:   Dysphagia 3(Mechanical Soft)specify fluid consistency(question 6) [3]     Order Specific Question:   Consistency/Fluid modifications:     Answer:   Thin Liquids [3]     Medical Decision Making, by Problem:  Brain abscesses on MRI with improvement on antibiotics   Repeat MRI today to look for improvement in abscesses   Patient requests sedation for MRI, will order for 1mg ativan for MRI   ID following, continue cefepime, flagyl and vancomycin     No endocarditis on imaging    Pelvic abscess resolved on repeat imaging, drain removed   MRI today per ID request    H/o pelvic fractures, pins in sacrum, may need removal by Dr. Porter in the future, previous note from Dr. Porter recommends 6 months from December, called and left message for Dr. Porter to call back regarding plan for possible pin removal   Mobility improving, continue therapy, patient still resistant to sitting due to pain    breast cancer bilateral mastectomy status post chemotherapy, treated over 20 years ago per patient, outpatient follow up    Slurred speech due to brain lesions,    Speech therapy, speech is improved  Dysphagia, oral diet   History of dvt, xarelto  Thrombocytopenia, improved off merrem  Thick toenails, podiatry trimming appreciated    Restless leg  syndrome, mirapex  Dyslipidemia, pravachol  Essential hypertension, norvasc  Debility, patient resides at a SNF, will need placement on discharge    DNR      Quality-Core Measures   Reviewed items::  Labs reviewed and Medications reviewed  Sue catheter::  No Sue  DVT prophylaxis pharmacological::  Enoxaparin (Lovenox)  Ulcer Prophylaxis::  Yes  Antibiotics:  Treating active infection/contamination beyond 24 hours perioperative coverage  Assessed for rehabilitation services:  Patient was assess for and/or received rehabilitation services during this hospitalization

## 2019-05-20 NOTE — [facility]
Infectious Disease Progress Note    Author: Beatriz Johnson M.D. Date & Time of service: 5/20/2019  1:06 PM    Chief Complaint:  Brain abscess, iliopsoas abscess, leukopenia     Interval History:   68 y.o. female admitted 4/30/2019. 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  Work-up revealed a right iliopsoas lesion, gluteal abscess, and mult brain abscesses  5/6 Interval 24 hours of Vitals/Labs/Micro,and imaging results reviewed as available. See assessment.   5/8 Interval 24 hours of Vitals/Labs/Micro,and imaging results reviewed as available. See assessment.   5/10 AF WBC 3 continued c/o constipation denies HA, visual changes, neuro deficits  5/11 AF WBC 8.8 isolation stopped due to resolution neutropenia-sleepy today  5/12 AF no CBC somnolent-no acute events noted  5/13 AF WBC 6.2 c/o pain left hip today, unchanged Worse with movement and improved with ice. Refused PT yesterday   5/14 AF c/o dizzyness whenever she tirns on her side-no new HA or visual changes-still havng hip pain  5/15 AF sleeping at time of rounds-by report ambulating  5/16 AF weightbearing continues to improve-ambulating more.  No new complaints  5/20- still has some pain in the hip but ambulating without any issues. No fevers. In good spirits.  Review of Systems:  Review of Systems   Constitutional: Negative for chills and fever.   Musculoskeletal: Positive for joint pain.   Neurological: Negative for dizziness.   All other systems reviewed and are negative.      Hemodynamics:  VSS     Physical Exam:  Physical Exam   Constitutional: No distress.   HENT:   Mouth/Throat: No oropharyngeal exudate.   Eyes: Right eye exhibits no discharge. Left eye exhibits no discharge.   Neck: Neck supple. No JVD present.   Cardiovascular: Normal rate and regular rhythm.     Pulmonary/Chest: Effort normal. No stridor. No respiratory distress.   Abdominal: Soft. Bowel sounds are normal.   Musculoskeletal: She exhibits no edema.   PICC no erythema     Neurological: She displays normal reflexes.   Skin: Skin is warm. No rash noted. She is not diaphoretic.   Psychiatric: She has a normal mood and affect. Her behavior is normal.   Vitals reviewed.    Labs:  No results for input(s): WBC, RBC, HEMOGLOBIN, HEMATOCRIT, MCV, MCH, RDW, PLATELETCT, MPV, NEUTSPOLYS, LYMPHOCYTES, MONOCYTES, EOSINOPHILS, BASOPHILS, RBCMORPHOLO in the last 72 hours.  No results for input(s): SODIUM, POTASSIUM, CHLORIDE, CO2, GLUCOSE, BUN, CPKTOTAL in the last 72 hours.  No results for input(s): ALBUMIN, TBILIRUBIN, ALKPHOSPHAT, TOTPROTEIN, ALTSGPT, ASTSGOT, CREATININE in the last 72 hours.    Imaging:  Ct-abdomen-pelvis With    Result Date: 4/19/2019 4/18/2019 7:00 PM HISTORY/REASON FOR EXAM: Post-op infection, abdomen pelvis, known TECHNIQUE/EXAM DESCRIPTION:  CT abdomen and pelvis with contrast. Sequential axial CT images were obtained from lung bases to the proximal femurs after the uneventful administration of 100 cc Omnipaque 350 intravenous contrast. Low dose optimization technique was utilized for this CT exam including automated exposure control and adjustment of the mA and/or kV according to patient size. COMPARISON:  April 8, 2019 CT ABDOMEN FINDINGS: Linear densities are seen within the lung bases, appearance favors atelectasis. Trace pericardial effusion is noted. The liver is normal in contour. The liver is normal in size. No intrahepatic biliary ductal dilatation is noted. The gallbladder appears within normal limits. The spleen is unremarkable. The pancreas is grossly normal. Bilateral adrenal glands appear within normal limits. The kidneys are unremarkable.  The ureters are normal in caliber along their visualized course. The colon appears within normal limits. There is fluid filled distention of the  small bowel is seen. Levoscoliosis of the lumbar spine is seen. Atherosclerotic calcifications are seen. CT PELVIS FINDINGS: The bladder is within normal limits. Postsurgical changes of hysterectomy are noted. Enhancing fluid collection is seen in the right gluteal musculature measuring 2.0 cm. Multiloculated appearing density fluid collection along the right iliacus muscle is  seen measuring approximately 3.4 x 2.0 cm. Enlarged bilateral inguinal lymph nodes are noted. Postsurgical changes of bilateral SI joints and sacral screw fixation are noted. Bony remodeling of right iliac and sacral fractures is observed.     1.  Enhancing fluid collection in the right gluteal muscle, appearance compatible with small abscess. 2.  Loculated enhancing fluid collection in the right iliac is muscle, corresponding with site of prior drain, appearance compatible with reaccumulation of abscess. 3.  Fluid-filled distention of small bowel suggests ileus 4.  Atherosclerosis 5.  Trace pericardial effusion    Ct-abdomen-pelvis With    Result Date: 4/8/2019 4/8/2019 4:24 PM HISTORY/REASON FOR EXAM: Abdominal infection, abdominal pain Metastatic breast cancer TECHNIQUE/EXAM DESCRIPTION:  CT scan of the abdomen and pelvis with contrast. Contrast-enhanced helical scanning was obtained from the diaphragmatic domes through the pubic symphysis following the bolus administration of nonionic contrast without complication. 100 mL of Omnipaque 350 nonionic contrast was administered without complication. Low dose optimization technique was utilized for this CT exam including automated exposure control and adjustment of the mA and/or kV according to patient size. COMPARISON: CT abdomen and pelvis 4/2/2019 FINDINGS: Lung bases: The lung bases are clear. Bilateral breast implants are present. Osseous structures:  Surgical screw traverses both sacroiliac joint. There is fracture of the right ilium. There appears to be destructive change of the right  medial sacrum. This is most consistent with known metastatic disease. Abdomen: The liver is enlarged without focal abnormality. There is no biliary dilation. The spleen is normal in appearance. The pancreas is normal in appearance. The splenic vein and portal vein enhance normally. Both adrenal glands are normal in appearance. The right kidney is normal in appearance. The left kidney is normal in appearance. There is no hydronephrosis. Bowel and mesentery: Within normal limits. The aorta shows atherosclerosis without aneurysm. The terminal ileum is normal in appearance. The appendix is not identified. Pelvis: There is a drainage catheter within or just anterior to the right iliacus muscle. There is no residual associated fluid. Right gluteal drainage catheter has been removed.     1.  Resolution of right iliacus abscess with no associated fluid adjacent to the drainage catheter 2.  Interval removal of right gluteal drainage catheter 3.  Surgical screw traversing both sacroiliac joint with associated pathologic fracture of the right ilium and right medial sacrum 4.  Hepatomegaly    Ct-chest,abdomen,pelvis With    Result Date: 4/23/2019 4/23/2019 8:57 PM HISTORY/REASON FOR EXAM: Breast cancer. Shortness of breath. TECHNIQUE/EXAM DESCRIPTION: CT scan of the chest, abdomen and pelvis with contrast. Thin-section helical scanning was obtained with intravenous contrast from the lung apices through the pubic symphysis to include the chest, abdomen and pelvis. 80 mL of Omnipaque 350 nonionic contrast was administered intravenously without complication. Low dose optimization technique was utilized for this CT exam including automated exposure control and adjustment of the mA and/or kV according to patient size. COMPARISON: None Available FINDINGS: The entire study is degraded by patient respiratory motion artifact. CT CHEST: There is limited evaluation of the lungs due to extensive patient respiratory motion artifact. There  is a linear atelectasis with groundglass opacity bilaterally. There are bilateral breast implants. There are bilateral axillary lymph nodes seen which measure up to 13 mm in short axis dimension bilaterally. There are surgical clips within the axilla. There are small bilateral thyroid nodules. There is no evidence of pericardial effusion. There is moderate atherosclerotic change of the aorta or coronary vessels. CT ABDOMEN: There is fatty change of the liver. The spleen is enlarged. There is dense material layering dependently within the gallbladder consistent with gallstones versus vicarious excretion of contrast. The spleen is normal. The kidneys are normal. The adrenal glands are normal. The pancreas is normal. There are retroperitoneal lymph nodes which measure less than 10 mm in short axis dimension. Similar sized periportal and portacaval lymph nodes are seen as well. CT PELVIS: The appendix is not identified. There is moderate constipation. There is no evidence of bowel obstruction or inflammatory change. There is no evidence of free fluid. There screws extending through the sacrum bilaterally. There is surrounding lucency and fragmentation with fracture extending into the right ilium. There is some fluid seen within the right gluteal and iliac is muscles. The right gluteal fluid collection  measures 18 mm in size and is not significantly changed. The fluid collection within the right iliacis muscle has increased in size now measures 2.7 cm.     1.  Limited evaluation of the thorax due to extensive patient respiratory motion artifact. 2.  Borderline enlarged bilateral axillary lymph nodes. 3.  Linear atelectasis within the lungs with patchy groundglass opacity bilaterally. 4.  Again seen screws extending through the sacrum bilaterally. There is surrounding lucency and fragmentation of the sacrum as well as the right greater than left ilium and a right iliac fracture. It is uncertain if these represent  metastatic lesions with pathologic fracture versus insufficiency fractures. 5.  Fluid collections within the right gluteal and iliacis muscles.. The collection within the right gluteal muscle has unchanged while the fluid collection within the right iliacis muscle is increased somewhat in size. Differential diagnosis includes hematoma as well as abscess. 6.  Enlarged retroperitoneal, periportal and portacaval lymph nodes.    Ct-head With & W/o    Result Date: 4/13/2019 4/13/2019 4:12 PM HISTORY/REASON FOR EXAM:  History of septic emboli, follow-up. TECHNIQUE/EXAM DESCRIPTION AND NUMBER OF VIEWS: CT scan of the head without and with contrast. The study was performed on a helical multidetector CT scanner. Contiguous axial sections were obtained from the skull base through the vertex both prior to and after the administration of IV contrast. 100 mL of Omnipaque 350 nonionic contrast was injected intravenously. Up to date radiation dose reduction adjustments have been utilized to meet ALARA standards for radiation dose reduction. COMPARISON:  CT dated 3/30/2019, 3/17/2019, MR dated 3/7/2019 FINDINGS: There are persistent small hyperenhancing lesions scattered throughout the brain. Largest lesion in the left basal ganglia measures 4.6 mm and demonstrates mild associated edema. Largest lesion in the right cerebral hemisphere measures 4 mm, also with mild associated edema. Several enhancing lesions are also seen in the brainstem. All of the lesions appear stable since prior. No new or enlarging lesions are detected The calvariae and skull base are unremarkable. There are no extraaxial fluid collections. The ventricular system and basal cisterns are within normal limits. There are no hemorrhagic lesions. The paranasal sinuses and mastoids in the field of view are unremarkable. Mild atrophy and chronic small vessel ischemic changes.     1.  No change in scattered hyperenhancing lesions throughout the brain, cerebellum and  visualized brainstem. Some of them demonstrate mild associated edema, similar to prior study. 2.  No CT evidence of infarct or hemorrhage.    Dx-chest-limited (1 View)    Result Date: 4/22/2019 4/22/2019 8:40 PM HISTORY/REASON FOR EXAM:  Shortness of Breath TECHNIQUE/EXAM DESCRIPTION AND NUMBER OF VIEWS: Single portable view of the chest. COMPARISON: 4/16/2019 FINDINGS: HEART: Stable size. LUNGS: No areas of air space disease are demonstrated. PLEURA: No effusion or pneumothorax.     No acute cardiac or pulmonary abnormalities are identified. Lung volumes are low.    Dx-chest-portable (1 View)    Result Date: 4/30/2019 4/30/2019 4:19 PM HISTORY/REASON FOR EXAM:  PICC line placement. IV access necessity. TECHNIQUE/EXAM DESCRIPTION AND NUMBER OF VIEWS: Single portable view of the chest. COMPARISON:  4/22/2019 FINDINGS: A right arm PICC line is present in satisfactory position with its tip projecting over the SVC at the level of the jennifer. The cardiac silhouette is within normal limits. No infiltrates or consolidations are noted. No pleural effusion is noted.     1.  Right arm PICC line tip projects in satisfactory position. 2.  Clear chest.    Dx-chest-portable (1 View)    Result Date: 4/16/2019 4/16/2019 5:43 AM HISTORY/REASON FOR EXAM: Shortness of Breath TECHNIQUE/EXAM DESCRIPTION:  Single AP view of the chest. COMPARISON: March 4, 2019 FINDINGS: Cardiomegaly is observed. The mediastinal contour appears within normal limits.  The central  pulmonary vasculature appears prominent and indistinct. Bilateral lung volumes are diminished.  Diffuse scattered hazy pulmonary parenchymal opacities are seen. No significant pleural effusions are identified. The bony structures appear age-appropriate.     1.  Mild pulmonary edema and/or infiltrate 2.  Cardiomegaly    Mr-brain-with & W/o    Result Date: 4/24/2019 4/24/2019 3:42 PM HISTORY/REASON FOR EXAM:  eval change of brain abscesses. TECHNIQUE/EXAM DESCRIPTION:   MRI  of the brain without and with contrast. T1 sagittal, T2 fast spin-echo axial, T1 coronal, FLAIR coronal, diffusion-weighted and apparent diffusion coefficient (ADC map) axial images were obtained of the whole brain. T1 postcontrast axial and T1 postcontrast coronal images were obtained. The study was performed on a Cloud[employer]urea 1.5 Miranda MRI scanner. 6 mL Gadavist contrast was administered intravenously. COMPARISON:  3/7/2090 FINDINGS:     The post gadolinium sequences demonstrates multifocal areas of nodular enhancement seen throughout the supra and infratentorial brain parenchyma. Some of the lesions demonstrates surrounding T2 hyperintensity. There is no acute infarct. There is no acute or chronic parenchymal hemorrhage. There is no hydrocephalus. Mild cerebral volume loss is seen. There is no extra-axial fluid collection, hemorrhage or mass. The vertebrobasilar system is diminutive in caliber. There are fetal origins of bilateral posterior cerebral arteries. There is no large lesion identified in the expected course of the intracranial portions of the cranial nerves. The skull bones are unremarkable. The paranasal sinuses are clear. The extracranial soft tissue including orbits appear grossly normal.     Multiple punctate enhancing lesions seen throughout the supra and infratentorial brain parenchyma. When compared with the previous MRI there has been interval decrease in the size of the lesions. There are also interval reduction in the extent of the surrounding white matter edema in the restricted diffusion consistent with improvement/treatment response of the most of the multifocal brain abscesses. However there are new enhancing lesions in the left basal ganglia and right cerebellum consistent with interval new abscesses.Follow-up is recommended to ensure the complete resolution of the enhancing lesions. Please note that the radiological differential diagnosis of this multifocal lesions still includes  metastasis. However decrease in the size of the most of the lesions favors the diagnosis of infection.     Ct-cta Chest Pulmonary Artery W/ Recons    Result Date: 4/16/2019 4/16/2019 6:13 AM HISTORY/REASON FOR EXAM:  Shortness of breath TECHNIQUE/EXAM DESCRIPTION:  CT angiogram of the chest with contrast. Transaxial MDCT scan of chest post bolus 55 cc Omnipaque 350 IV administration. MIP reconstructed images were created and reviewed. Low dose optimization technique was utilized for this CT exam including automated exposure control and adjustment of the mA and/or kV according to patient size. COMPARISON:  March 5, 2019 FINDINGS: The visualized portion of the thyroid appear within normal limits.  The trachea and main stem airways are normal in caliber. There are no pathologically enlarged mediastinal lymph nodes. Trace pericardial effusion is seen, otherwise the heart and pericardium appear within normal limits.  Atherosclerotic calcifications are identified.  The pulmonary arteries are well opacified, no large central pulmonary arterial filling defect is appreciated, respiratory motion artifacts limit evaluation of the subsegmental pulmonary arterial branches. No pulmonary consolidation is identified. 7.7 mm medial pleural-based pulmonary nodule is seen on image 140. 6.5 mm pulmonary nodule is seen in the right midlung, best visualized on coronal imaging. Calcified granuloma in the left apex is seen. Limited views of the abdomen demonstrate enlargement of the liver. The bony structures are age appropriate.     1.  No large central pulmonary embolus is appreciated, evaluation of the subsegmental branches is essentially nondiagnostic due to motion artifacts. Additional imaging would be required for definitive exclusion of small distal pulmonary emboli. 2.  Trace pericardial effusion 3.  Hepatomegaly 4.  Atherosclerosis. 5.  Right pulmonary nodules, the largest is a 7.7 mm pulmonary nodule, see nodule follow-up  "recommendations below. Low Risk: CT at 3-6 months, then consider CT at 18-24 months High Risk: CT at 3-6 months, then at 18-24 months Comments: Use most suspicious nodule as guide to management. Follow-up intervals may vary according to size and risk. Low Risk - Minimal or absent history of smoking and of other known risk factors. High Risk - History of smoking or of other known risk factors. Note: These recommendations do not apply to lung cancer screening, patients with immunosuppression, or patients with known primary cancer. Fleischner Society 2017 Guidelines for Management of Incidentally Detected Pulmonary Nodules in Adults     Ir-picc Line Placement W/ Guidance > Age 5    Result Date: 4/29/2019  HISTORY/REASON FOR EXAM:   PICC placement. TECHNIQUE/EXAM DESCRIPTION AND NUMBER OF VIEWS:   PICC line insertion with ultrasound guidance.  The procedure was performed using maximal sterile barrier technique including sterile gown, mask, cap, and donning of sterile gloves following appropriate hand hygiene and/or sterile scrub. Patient skin site was prepped with 2% Chlorhexidine solution. FINDINGS:  PICC line insertion with Ultrasound Guidance was performed by qualified nursing staff without the assistance of a Radiologist. PICC positioning appropriateness confirmed by 3CG technology; chest xray only needed in the instance 3CG unable to confirm placement.              Ultrasound-guided PICC placement performed by qualified nursing staff as above.       Micro:  Results     ** No results found for the last 168 hours. **           ASSESSMENT/PLAN:   Brain abscesses, additional work  Afebrile  Leukopenia resolved  Original abscesses improved on therapy.   MRI 4/23 \"supra and infratentorial brain parenchyma... interval decrease in the size of the lesions. There are also interval reduction in the extent of the surrounding white matter edema in the restricted diffusion consistent with improvement/treatment response of the " "most of the multifocal brain abscesses.  New enhancing lesions in the left basal ganglia and right cerebellum  Mult blood cultures neg  CHAS neg 3/15  DIzzyness likely related to brain abscesses  Continue vancomycin cefepime, flagyl  Repeat MRI today   follow the results  Gluteal and iliopsoas abscess, on treatment  Adjacent to hardware in right hip (placed 12/17/18)  CT 4/23 \"Fluid collections within the right gluteal and iliacis muscles.. The collection within the right gluteal muscle has unchanged while the fluid collection within the right iliacis muscle is increased somewhat in size.\" 2.7 cm  Cultures 3/29 and 4/4 neg  Improved pain control-able to ambulate  Repeat MRI this week if feasible to verify resolution-had MRI pelvis 3/29  Possible removal of hardware per Ortho      Encephalopathy-improved  Multifactorial: meds, brain abscess  Aspiration and fall  precautions     Diabetes  Keep BS under 150 to help control current infection     H/o breast cancer  Ongoing concern that the lesions in her brain are metastatic in nature     DW IM Dr Mitchell            "

## 2019-05-21 ENCOUNTER — APPOINTMENT (OUTPATIENT)
Dept: RADIOLOGY | Facility: MEDICAL CENTER | Age: 69
End: 2019-05-21
Attending: HOSPITALIST
Payer: MEDICARE

## 2019-05-21 LAB — VANCOMYCIN TROUGH SERPL-MCNC: 19.8 UG/ML (ref 10–20)

## 2019-05-21 PROCEDURE — 70553 MRI BRAIN STEM W/O & W/DYE: CPT

## 2019-05-21 PROCEDURE — 80202 ASSAY OF VANCOMYCIN: CPT

## 2019-05-21 PROCEDURE — 71045 X-RAY EXAM CHEST 1 VIEW: CPT

## 2019-05-21 ASSESSMENT — ENCOUNTER SYMPTOMS
NAUSEA: 0
COUGH: 0
VOMITING: 0
HEADACHES: 0
SHORTNESS OF BREATH: 0
CONSTIPATION: 0
SORE THROAT: 0
ABDOMINAL PAIN: 0
FEVER: 0

## 2019-05-21 NOTE — [facility]
Hospital Medicine Progress Note, Adult, Complex               Author: Elvira Cox Date & Time created: 5/21/2019  5:31 AM     CC: brain abscesses    Interval History:  Had MRI of brain and pelvis yesterday, read is P  Ambulated about 200 feet in total yesterday  Says the yogurt drink gives her heartburn  Says she is going to [facility] tomorrow    Review of Systems:  Review of Systems   Constitutional: Negative for fever.   HENT: Negative for sore throat.    Respiratory: Negative for cough and shortness of breath.    Cardiovascular: Negative for chest pain.   Gastrointestinal: Negative for abdominal pain, constipation, nausea and vomiting.   Musculoskeletal: Positive for joint pain (hips).   Neurological: Negative for headaches.       T 97.8 P 68 /69 RR 20 SaO2 97% I/Onot in chart BM 5/19  Physical Exam   Constitutional: She appears well-developed. No distress.   HENT:   Head: Normocephalic and atraumatic.   Eyes: Conjunctivae are normal. Right eye exhibits no discharge. Left eye exhibits no discharge. No scleral icterus.   Neck: No tracheal deviation present.   Cardiovascular: Normal rate, regular rhythm and intact distal pulses.    Pulmonary/Chest: Effort normal. No respiratory distress. She has no wheezes.   Abdominal: Soft. Bowel sounds are normal. She exhibits no distension. There is no tenderness.   Musculoskeletal: She exhibits no edema.   Neurological: She is alert.        Skin: Skin is warm.   Vitals reviewed.      Labs:          No results for input(s): SODIUM, POTASSIUM, CHLORIDE, CO2, BUN, CREATININE, MAGNESIUM, PHOSPHORUS, CALCIUM in the last 72 hours.  No results for input(s): ALTSGPT, ASTSGOT, ALKPHOSPHAT, TBILIRUBIN, DBILIRUBIN, GAMMAGT, AMYLASE, LIPASE, ALB, PREALBUMIN, GLUCOSE in the last 72 hours.  No results for input(s): RBC, HEMOGLOBIN, HEMATOCRIT, PLATELETCT, PROTHROMBTM, APTT, INR, IRON, FERRITIN, TOTIRONBC in the last 72 hours.           GI/Nutrition:  Orders Placed This  Encounter   Procedures   • Diet Order Diabetic     Standing Status:   Standing     Number of Occurrences:   1     Order Specific Question:   Diet:     Answer:   Diabetic [3]     Order Specific Question:   Texture/Fiber modifications:     Answer:   Dysphagia 3(Mechanical Soft)specify fluid consistency(question 6) [3]     Order Specific Question:   Consistency/Fluid modifications:     Answer:   Thin Liquids [3]     Medical Decision Making, by Problem:  Brain abscesses vs. metastatic disease   -Vanco, cefepime, flagyl 5/25   -repeat MRI P   -MRI 4/24 with some decrease in size and areas of presumed new abscess  R gluteal/iliac abscess   -MRI P   -continue idopatch/voltaren gel  H/o breast cancer with reconstruction  DM2   -metformin  Metabolic encephalopathy   -follow    -improved  RLS   -mirapex   H/o DVT   -xarelto  Neutropenia-resolved   -follow   -granix 5/10   -repeat in am  R lung pulm nodule   -outpatient f/u  Mild hypokalemia   -repleted  HTN   -norvasc, cozaar, metoprolol  HLD   -lipitor  GERD  Hyponatremia  Hypercalcemia   -sensipar  Poor po intake   -qhs remeron, no megace with h/o breast cancer  Debility   -PT  Am labs    Quality-Core Measures   Reviewed items::  Medications reviewed  Sue catheter::  No Sue  Central line in place:  Concentrated IV drugs  DVT: xarelto.  Antibiotics:  Treating active infection/contamination beyond 24 hours perioperative coverage

## 2019-05-22 LAB
ALBUMIN SERPL BCP-MCNC: 3.4 G/DL (ref 3.2–4.9)
ALBUMIN/GLOB SERPL: 0.7 G/DL
ALP SERPL-CCNC: 186 U/L (ref 30–99)
ALT SERPL-CCNC: 20 U/L (ref 2–50)
ANION GAP SERPL CALC-SCNC: 7 MMOL/L (ref 0–11.9)
AST SERPL-CCNC: 40 U/L (ref 12–45)
BILIRUB SERPL-MCNC: 0.5 MG/DL (ref 0.1–1.5)
BUN SERPL-MCNC: 19 MG/DL (ref 8–22)
CALCIUM SERPL-MCNC: 10.2 MG/DL (ref 8.4–10.2)
CHLORIDE SERPL-SCNC: 104 MMOL/L (ref 96–112)
CO2 SERPL-SCNC: 18 MMOL/L (ref 20–33)
CREAT SERPL-MCNC: 0.49 MG/DL (ref 0.5–1.4)
ERYTHROCYTE [DISTWIDTH] IN BLOOD BY AUTOMATED COUNT: 61.8 FL (ref 35.9–50)
GLOBULIN SER CALC-MCNC: 4.6 G/DL (ref 1.9–3.5)
GLUCOSE SERPL-MCNC: 85 MG/DL (ref 65–99)
HCT VFR BLD AUTO: 40.5 % (ref 37–47)
HGB BLD-MCNC: 13.1 G/DL (ref 12–16)
MCH RBC QN AUTO: 27.1 PG (ref 27–33)
MCHC RBC AUTO-ENTMCNC: 32.3 G/DL (ref 33.6–35)
MCV RBC AUTO: 83.7 FL (ref 81.4–97.8)
PLATELET # BLD AUTO: 243 K/UL (ref 164–446)
PMV BLD AUTO: 10.7 FL (ref 9–12.9)
POTASSIUM SERPL-SCNC: 4.8 MMOL/L (ref 3.6–5.5)
PROT SERPL-MCNC: 8 G/DL (ref 6–8.2)
RBC # BLD AUTO: 4.84 M/UL (ref 4.2–5.4)
SODIUM SERPL-SCNC: 129 MMOL/L (ref 135–145)
WBC # BLD AUTO: 6.8 K/UL (ref 4.8–10.8)

## 2019-05-22 PROCEDURE — 80053 COMPREHEN METABOLIC PANEL: CPT

## 2019-05-22 PROCEDURE — 85027 COMPLETE CBC AUTOMATED: CPT

## 2019-05-22 ASSESSMENT — ENCOUNTER SYMPTOMS
CONSTIPATION: 1
NAUSEA: 0
FEVER: 0
SORE THROAT: 0
ABDOMINAL PAIN: 0
PALPITATIONS: 0
COUGH: 0
DIZZINESS: 0
CHILLS: 0
HEADACHES: 0
SHORTNESS OF BREATH: 0
VOMITING: 0

## 2019-05-22 NOTE — [facility]
Hospital Medicine Progress Note, Adult, Complex               Author: Elvira VARNER [place] Date & Time created: 5/22/2019  6:56 AM     CC: brain abscesses    Interval History:  Refused PT yesterday waiting on results of MRI  MRI brain has increased number of small lesions  Has some constipation today  Eating better    Review of Systems:  Review of Systems   Constitutional: Negative for fever.   HENT: Negative for sore throat.    Respiratory: Negative for cough and shortness of breath.    Cardiovascular: Negative for chest pain and palpitations.   Gastrointestinal: Positive for constipation. Negative for abdominal pain, nausea and vomiting.   Musculoskeletal: Positive for joint pain (hips).   Neurological: Negative for headaches.       T 97.1 P 81BP 151/81 RR 18 SaO2 90% I/O2/1.6 BM 5/19  Physical Exam   Constitutional: She appears well-developed. No distress.   HENT:   Head: Normocephalic and atraumatic.   Eyes: Conjunctivae are normal. Right eye exhibits no discharge. Left eye exhibits no discharge. No scleral icterus.   Neck: No tracheal deviation present.   Cardiovascular: Normal rate, regular rhythm and intact distal pulses.    Pulmonary/Chest: Effort normal. No respiratory distress. She has no wheezes.   Abdominal: Soft. Bowel sounds are normal. She exhibits no distension. There is no tenderness.   Musculoskeletal: She exhibits no edema.   Neurological: She is alert.        Skin: Skin is warm.   Vitals reviewed.      Labs:          Recent Labs      05/22/19   0206   SODIUM  129*   POTASSIUM  4.8   CHLORIDE  104   CO2  18*   BUN  19   CREATININE  0.49*   CALCIUM  10.2     Recent Labs      05/22/19   0206   ALTSGPT  20   ASTSGOT  40   ALKPHOSPHAT  186*   TBILIRUBIN  0.5   GLUCOSE  85     Recent Labs      05/22/19   0206   RBC  4.84   HEMOGLOBIN  13.1   HEMATOCRIT  40.5   PLATELETCT  243     Recent Labs      05/22/19   0206   WBC  6.8   ASTSGOT  40   ALTSGPT  20   ALKPHOSPHAT  186*   TBILIRUBIN  0.5           GI/Nutrition:  Orders Placed This Encounter   Procedures   • Diet Order Diabetic     Standing Status:   Standing     Number of Occurrences:   1     Order Specific Question:   Diet:     Answer:   Diabetic [3]     Order Specific Question:   Texture/Fiber modifications:     Answer:   Dysphagia 3(Mechanical Soft)specify fluid consistency(question 6) [3]     Order Specific Question:   Consistency/Fluid modifications:     Answer:   Thin Liquids [3]     MRI brain 5/20  1.  Increase in number of innumerable small ovoid enhancing lesions throughout the brain parenchyma. These lesions may represent microabscesses of either bacterial or fungal origin or possibly brain metastases.    2.  Age-related cerebral atrophy.    MRI pelvis 5/20    1. Interval decrease in size of the collection in the right gluteal medius muscle, measuring 1.4 x 1.9 cm, previously 2.3 x 2.8 cm. There is minimal surrounding stranding.    Mild heterogeneity and stranding in the overlying right gluteus cornelio muscle without discrete collection, could relate to reactive change or phlegmon.    2. Grossly unchanged in size of the multiloculated collection in the right iliacus muscle, measuring 2.4 x 3.5 cm.    Medical Decision Making, by Problem:  Brain abscesses vs. metastatic disease   -Vanco, cefepime, flagyl 5/25   -repeat MRI with progression   -d/w ID, size and prior NSG consult preclude bx   -hold ECF transfer P ID recs  R gluteal/iliac abscess   -MRI with improved R gluteus abscess, unchanged R iliacus abscess   -continue idopatch/voltaren gel  H/o sacral fx and pinning   -pin unable to removed until healed (sometime this summer)  H/o breast cancer with reconstruction  DM2   -metformin  Metabolic encephalopathy-resolved  RLS   -mirapex   H/o DVT   -xarelto  Neutropenia-resolved   -granix 5/10  R lung pulm nodule   -outpatient f/u  HTN   -norvasc, cozaar, metoprolol  HLD   -lipitor  GERD  Hyponatremia  Hypercalcemia   -sensipar  Debility   -PT    Add  dulcolax suppository per d/w patient    Quality-Core Measures   Reviewed items::  Medications reviewed, Labs reviewed and Radiology images reviewed  Sue catheter::  No Sue  Central line in place:  Concentrated IV drugs  DVT: xarelto.  Antibiotics:  Treating active infection/contamination beyond 24 hours perioperative coverage

## 2019-05-22 NOTE — [facility]
Infectious Disease Progress Note    Author: Beatriz Johnson M.D. Date & Time of service: 5/22/2019  2:55 PM    Chief Complaint:  Brain abscess, iliopsoas abscess, leukopenia     Interval History:   68 y.o. female admitted 4/30/2019. 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  Work-up revealed a right iliopsoas lesion, gluteal abscess, and mult brain abscesses  5/6 Interval 24 hours of Vitals/Labs/Micro,and imaging results reviewed as available. See assessment.   5/8 Interval 24 hours of Vitals/Labs/Micro,and imaging results reviewed as available. See assessment.   5/10 AF WBC 3 continued c/o constipation denies HA, visual changes, neuro deficits  5/11 AF WBC 8.8 isolation stopped due to resolution neutropenia-sleepy today  5/12 AF no CBC somnolent-no acute events noted  5/13 AF WBC 6.2 c/o pain left hip today, unchanged Worse with movement and improved with ice. Refused PT yesterday   5/14 AF c/o dizzyness whenever she tirns on her side-no new HA or visual changes-still havng hip pain  5/15 AF sleeping at time of rounds-by report ambulating  5/16 AF weightbearing continues to improve-ambulating more.  No new complaints  5/20- still has some pain in the hip but ambulating without any issues. No fevers. In good spirits.  5/22/2019 continues to have hip pain.  No fevers have been noted  Review of Systems:  Review of Systems   Constitutional: Negative for chills and fever.   Musculoskeletal: Positive for joint pain.   Neurological: Negative for dizziness.   All other systems reviewed and are negative.      Hemodynamics:  T 97.1 P 81BP 151/81 RR 18 SaO2 90% I/O2/1.6 BM 5/19    Physical Exam:  Physical Exam   Constitutional: No distress.   HENT:   Mouth/Throat: No oropharyngeal exudate.   Eyes: Right eye exhibits no discharge. Left  eye exhibits no discharge.   Neck: Neck supple. No JVD present.   Cardiovascular: Normal rate and regular rhythm.    Pulmonary/Chest: Effort normal. No stridor. No respiratory distress.   Abdominal: Soft. Bowel sounds are normal.   Musculoskeletal: She exhibits no edema.   PICC no erythema     Neurological: She displays normal reflexes.   Skin: Skin is warm. No rash noted. She is not diaphoretic.   Psychiatric: She has a normal mood and affect. Her behavior is normal.   Vitals reviewed.    Labs:  Recent Labs      05/22/19   0206   WBC  6.8   RBC  4.84   HEMOGLOBIN  13.1   HEMATOCRIT  40.5   MCV  83.7   MCH  27.1   RDW  61.8*   PLATELETCT  243   MPV  10.7     Recent Labs      05/22/19   0206   SODIUM  129*   POTASSIUM  4.8   CHLORIDE  104   CO2  18*   GLUCOSE  85   BUN  19     Recent Labs      05/22/19   0206   ALBUMIN  3.4   TBILIRUBIN  0.5   ALKPHOSPHAT  186*   TOTPROTEIN  8.0   ALTSGPT  20   ASTSGOT  40   CREATININE  0.49*       Imaging:  Ct-abdomen-pelvis With    Result Date: 4/19/2019 4/18/2019 7:00 PM HISTORY/REASON FOR EXAM: Post-op infection, abdomen pelvis, known TECHNIQUE/EXAM DESCRIPTION:  CT abdomen and pelvis with contrast. Sequential axial CT images were obtained from lung bases to the proximal femurs after the uneventful administration of 100 cc Omnipaque 350 intravenous contrast. Low dose optimization technique was utilized for this CT exam including automated exposure control and adjustment of the mA and/or kV according to patient size. COMPARISON:  April 8, 2019 CT ABDOMEN FINDINGS: Linear densities are seen within the lung bases, appearance favors atelectasis. Trace pericardial effusion is noted. The liver is normal in contour. The liver is normal in size. No intrahepatic biliary ductal dilatation is noted. The gallbladder appears within normal limits. The spleen is unremarkable. The pancreas is grossly normal. Bilateral adrenal glands appear within normal limits. The kidneys are unremarkable.   The ureters are normal in caliber along their visualized course. The colon appears within normal limits. There is fluid filled distention of the small bowel is seen. Levoscoliosis of the lumbar spine is seen. Atherosclerotic calcifications are seen. CT PELVIS FINDINGS: The bladder is within normal limits. Postsurgical changes of hysterectomy are noted. Enhancing fluid collection is seen in the right gluteal musculature measuring 2.0 cm. Multiloculated appearing density fluid collection along the right iliacus muscle is  seen measuring approximately 3.4 x 2.0 cm. Enlarged bilateral inguinal lymph nodes are noted. Postsurgical changes of bilateral SI joints and sacral screw fixation are noted. Bony remodeling of right iliac and sacral fractures is observed.     1.  Enhancing fluid collection in the right gluteal muscle, appearance compatible with small abscess. 2.  Loculated enhancing fluid collection in the right iliac is muscle, corresponding with site of prior drain, appearance compatible with reaccumulation of abscess. 3.  Fluid-filled distention of small bowel suggests ileus 4.  Atherosclerosis 5.  Trace pericardial effusion    Ct-abdomen-pelvis With    Result Date: 4/8/2019 4/8/2019 4:24 PM HISTORY/REASON FOR EXAM: Abdominal infection, abdominal pain Metastatic breast cancer TECHNIQUE/EXAM DESCRIPTION:  CT scan of the abdomen and pelvis with contrast. Contrast-enhanced helical scanning was obtained from the diaphragmatic domes through the pubic symphysis following the bolus administration of nonionic contrast without complication. 100 mL of Omnipaque 350 nonionic contrast was administered without complication. Low dose optimization technique was utilized for this CT exam including automated exposure control and adjustment of the mA and/or kV according to patient size. COMPARISON: CT abdomen and pelvis 4/2/2019 FINDINGS: Lung bases: The lung bases are clear. Bilateral breast implants are present. Osseous  structures:  Surgical screw traverses both sacroiliac joint. There is fracture of the right ilium. There appears to be destructive change of the right medial sacrum. This is most consistent with known metastatic disease. Abdomen: The liver is enlarged without focal abnormality. There is no biliary dilation. The spleen is normal in appearance. The pancreas is normal in appearance. The splenic vein and portal vein enhance normally. Both adrenal glands are normal in appearance. The right kidney is normal in appearance. The left kidney is normal in appearance. There is no hydronephrosis. Bowel and mesentery: Within normal limits. The aorta shows atherosclerosis without aneurysm. The terminal ileum is normal in appearance. The appendix is not identified. Pelvis: There is a drainage catheter within or just anterior to the right iliacus muscle. There is no residual associated fluid. Right gluteal drainage catheter has been removed.     1.  Resolution of right iliacus abscess with no associated fluid adjacent to the drainage catheter 2.  Interval removal of right gluteal drainage catheter 3.  Surgical screw traversing both sacroiliac joint with associated pathologic fracture of the right ilium and right medial sacrum 4.  Hepatomegaly    Ct-chest,abdomen,pelvis With    Result Date: 4/23/2019 4/23/2019 8:57 PM HISTORY/REASON FOR EXAM: Breast cancer. Shortness of breath. TECHNIQUE/EXAM DESCRIPTION: CT scan of the chest, abdomen and pelvis with contrast. Thin-section helical scanning was obtained with intravenous contrast from the lung apices through the pubic symphysis to include the chest, abdomen and pelvis. 80 mL of Omnipaque 350 nonionic contrast was administered intravenously without complication. Low dose optimization technique was utilized for this CT exam including automated exposure control and adjustment of the mA and/or kV according to patient size. COMPARISON: None Available FINDINGS: The entire study is degraded  by patient respiratory motion artifact. CT CHEST: There is limited evaluation of the lungs due to extensive patient respiratory motion artifact. There is a linear atelectasis with groundglass opacity bilaterally. There are bilateral breast implants. There are bilateral axillary lymph nodes seen which measure up to 13 mm in short axis dimension bilaterally. There are surgical clips within the axilla. There are small bilateral thyroid nodules. There is no evidence of pericardial effusion. There is moderate atherosclerotic change of the aorta or coronary vessels. CT ABDOMEN: There is fatty change of the liver. The spleen is enlarged. There is dense material layering dependently within the gallbladder consistent with gallstones versus vicarious excretion of contrast. The spleen is normal. The kidneys are normal. The adrenal glands are normal. The pancreas is normal. There are retroperitoneal lymph nodes which measure less than 10 mm in short axis dimension. Similar sized periportal and portacaval lymph nodes are seen as well. CT PELVIS: The appendix is not identified. There is moderate constipation. There is no evidence of bowel obstruction or inflammatory change. There is no evidence of free fluid. There screws extending through the sacrum bilaterally. There is surrounding lucency and fragmentation with fracture extending into the right ilium. There is some fluid seen within the right gluteal and iliac is muscles. The right gluteal fluid collection  measures 18 mm in size and is not significantly changed. The fluid collection within the right iliacis muscle has increased in size now measures 2.7 cm.     1.  Limited evaluation of the thorax due to extensive patient respiratory motion artifact. 2.  Borderline enlarged bilateral axillary lymph nodes. 3.  Linear atelectasis within the lungs with patchy groundglass opacity bilaterally. 4.  Again seen screws extending through the sacrum bilaterally. There is surrounding  lucency and fragmentation of the sacrum as well as the right greater than left ilium and a right iliac fracture. It is uncertain if these represent metastatic lesions with pathologic fracture versus insufficiency fractures. 5.  Fluid collections within the right gluteal and iliacis muscles.. The collection within the right gluteal muscle has unchanged while the fluid collection within the right iliacis muscle is increased somewhat in size. Differential diagnosis includes hematoma as well as abscess. 6.  Enlarged retroperitoneal, periportal and portacaval lymph nodes.    Ct-head With & W/o    Result Date: 4/13/2019 4/13/2019 4:12 PM HISTORY/REASON FOR EXAM:  History of septic emboli, follow-up. TECHNIQUE/EXAM DESCRIPTION AND NUMBER OF VIEWS: CT scan of the head without and with contrast. The study was performed on a helical multidetector CT scanner. Contiguous axial sections were obtained from the skull base through the vertex both prior to and after the administration of IV contrast. 100 mL of Omnipaque 350 nonionic contrast was injected intravenously. Up to date radiation dose reduction adjustments have been utilized to meet ALARA standards for radiation dose reduction. COMPARISON:  CT dated 3/30/2019, 3/17/2019, MR dated 3/7/2019 FINDINGS: There are persistent small hyperenhancing lesions scattered throughout the brain. Largest lesion in the left basal ganglia measures 4.6 mm and demonstrates mild associated edema. Largest lesion in the right cerebral hemisphere measures 4 mm, also with mild associated edema. Several enhancing lesions are also seen in the brainstem. All of the lesions appear stable since prior. No new or enlarging lesions are detected The calvariae and skull base are unremarkable. There are no extraaxial fluid collections. The ventricular system and basal cisterns are within normal limits. There are no hemorrhagic lesions. The paranasal sinuses and mastoids in the field of view are unremarkable.  Mild atrophy and chronic small vessel ischemic changes.     1.  No change in scattered hyperenhancing lesions throughout the brain, cerebellum and visualized brainstem. Some of them demonstrate mild associated edema, similar to prior study. 2.  No CT evidence of infarct or hemorrhage.    Dx-chest-limited (1 View)    Result Date: 4/22/2019 4/22/2019 8:40 PM HISTORY/REASON FOR EXAM:  Shortness of Breath TECHNIQUE/EXAM DESCRIPTION AND NUMBER OF VIEWS: Single portable view of the chest. COMPARISON: 4/16/2019 FINDINGS: HEART: Stable size. LUNGS: No areas of air space disease are demonstrated. PLEURA: No effusion or pneumothorax.     No acute cardiac or pulmonary abnormalities are identified. Lung volumes are low.    Dx-chest-portable (1 View)    Result Date: 4/30/2019 4/30/2019 4:19 PM HISTORY/REASON FOR EXAM:  PICC line placement. IV access necessity. TECHNIQUE/EXAM DESCRIPTION AND NUMBER OF VIEWS: Single portable view of the chest. COMPARISON:  4/22/2019 FINDINGS: A right arm PICC line is present in satisfactory position with its tip projecting over the SVC at the level of the jennifer. The cardiac silhouette is within normal limits. No infiltrates or consolidations are noted. No pleural effusion is noted.     1.  Right arm PICC line tip projects in satisfactory position. 2.  Clear chest.    Dx-chest-portable (1 View)    Result Date: 4/16/2019 4/16/2019 5:43 AM HISTORY/REASON FOR EXAM: Shortness of Breath TECHNIQUE/EXAM DESCRIPTION:  Single AP view of the chest. COMPARISON: March 4, 2019 FINDINGS: Cardiomegaly is observed. The mediastinal contour appears within normal limits.  The central  pulmonary vasculature appears prominent and indistinct. Bilateral lung volumes are diminished.  Diffuse scattered hazy pulmonary parenchymal opacities are seen. No significant pleural effusions are identified. The bony structures appear age-appropriate.     1.  Mild pulmonary edema and/or infiltrate 2.   Cardiomegaly    Mr-brain-with & W/o    Result Date: 4/24/2019 4/24/2019 3:42 PM HISTORY/REASON FOR EXAM:  eval change of brain abscesses. TECHNIQUE/EXAM DESCRIPTION:   MRI of the brain without and with contrast. T1 sagittal, T2 fast spin-echo axial, T1 coronal, FLAIR coronal, diffusion-weighted and apparent diffusion coefficient (ADC map) axial images were obtained of the whole brain. T1 postcontrast axial and T1 postcontrast coronal images were obtained. The study was performed on a [employer] 1.5 Miranda MRI scanner. 6 mL Gadavist contrast was administered intravenously. COMPARISON:  3/7/2090 FINDINGS:     The post gadolinium sequences demonstrates multifocal areas of nodular enhancement seen throughout the supra and infratentorial brain parenchyma. Some of the lesions demonstrates surrounding T2 hyperintensity. There is no acute infarct. There is no acute or chronic parenchymal hemorrhage. There is no hydrocephalus. Mild cerebral volume loss is seen. There is no extra-axial fluid collection, hemorrhage or mass. The vertebrobasilar system is diminutive in caliber. There are fetal origins of bilateral posterior cerebral arteries. There is no large lesion identified in the expected course of the intracranial portions of the cranial nerves. The skull bones are unremarkable. The paranasal sinuses are clear. The extracranial soft tissue including orbits appear grossly normal.     Multiple punctate enhancing lesions seen throughout the supra and infratentorial brain parenchyma. When compared with the previous MRI there has been interval decrease in the size of the lesions. There are also interval reduction in the extent of the surrounding white matter edema in the restricted diffusion consistent with improvement/treatment response of the most of the multifocal brain abscesses. However there are new enhancing lesions in the left basal ganglia and right cerebellum consistent with interval new abscesses.Follow-up is  recommended to ensure the complete resolution of the enhancing lesions. Please note that the radiological differential diagnosis of this multifocal lesions still includes metastasis. However decrease in the size of the most of the lesions favors the diagnosis of infection.     Ct-cta Chest Pulmonary Artery W/ Recons    Result Date: 4/16/2019 4/16/2019 6:13 AM HISTORY/REASON FOR EXAM:  Shortness of breath TECHNIQUE/EXAM DESCRIPTION:  CT angiogram of the chest with contrast. Transaxial MDCT scan of chest post bolus 55 cc Omnipaque 350 IV administration. MIP reconstructed images were created and reviewed. Low dose optimization technique was utilized for this CT exam including automated exposure control and adjustment of the mA and/or kV according to patient size. COMPARISON:  March 5, 2019 FINDINGS: The visualized portion of the thyroid appear within normal limits.  The trachea and main stem airways are normal in caliber. There are no pathologically enlarged mediastinal lymph nodes. Trace pericardial effusion is seen, otherwise the heart and pericardium appear within normal limits.  Atherosclerotic calcifications are identified.  The pulmonary arteries are well opacified, no large central pulmonary arterial filling defect is appreciated, respiratory motion artifacts limit evaluation of the subsegmental pulmonary arterial branches. No pulmonary consolidation is identified. 7.7 mm medial pleural-based pulmonary nodule is seen on image 140. 6.5 mm pulmonary nodule is seen in the right midlung, best visualized on coronal imaging. Calcified granuloma in the left apex is seen. Limited views of the abdomen demonstrate enlargement of the liver. The bony structures are age appropriate.     1.  No large central pulmonary embolus is appreciated, evaluation of the subsegmental branches is essentially nondiagnostic due to motion artifacts. Additional imaging would be required for definitive exclusion of small distal pulmonary  "emboli. 2.  Trace pericardial effusion 3.  Hepatomegaly 4.  Atherosclerosis. 5.  Right pulmonary nodules, the largest is a 7.7 mm pulmonary nodule, see nodule follow-up recommendations below. Low Risk: CT at 3-6 months, then consider CT at 18-24 months High Risk: CT at 3-6 months, then at 18-24 months Comments: Use most suspicious nodule as guide to management. Follow-up intervals may vary according to size and risk. Low Risk - Minimal or absent history of smoking and of other known risk factors. High Risk - History of smoking or of other known risk factors. Note: These recommendations do not apply to lung cancer screening, patients with immunosuppression, or patients with known primary cancer. Fleischner Society 2017 Guidelines for Management of Incidentally Detected Pulmonary Nodules in Adults     Ir-picc Line Placement W/ Guidance > Age 5    Result Date: 4/29/2019  HISTORY/REASON FOR EXAM:   PICC placement. TECHNIQUE/EXAM DESCRIPTION AND NUMBER OF VIEWS:   PICC line insertion with ultrasound guidance.  The procedure was performed using maximal sterile barrier technique including sterile gown, mask, cap, and donning of sterile gloves following appropriate hand hygiene and/or sterile scrub. Patient skin site was prepped with 2% Chlorhexidine solution. FINDINGS:  PICC line insertion with Ultrasound Guidance was performed by qualified nursing staff without the assistance of a Radiologist. PICC positioning appropriateness confirmed by 3CG technology; chest xray only needed in the instance 3CG unable to confirm placement.              Ultrasound-guided PICC placement performed by qualified nursing staff as above.       Micro:  Results     ** No results found for the last 168 hours. **           ASSESSMENT/PLAN:   Brain abscesses, additional work  Afebrile  Leukopenia resolved  Original abscesses improved on therapy.   MRI 4/23 \"supra and infratentorial brain parenchyma... interval decrease in the size of the lesions. " "There are also interval reduction in the extent of the surrounding white matter edema in the restricted diffusion consistent with improvement/treatment response of the most of the multifocal brain abscesses.  New enhancing lesions in the left basal ganglia and right cerebellum  Mult blood cultures neg  CHAS neg 3/15  The repeat MRI shows persistent of the abscesses and increase in the number  Discontinue all antibiotics and reassess  She likely needs a biopsy  Gluteal and iliopsoas abscess, on treatment  Adjacent to hardware in right hip (placed 12/17/18)  CT 4/23 \"Fluid collections within the right gluteal and iliacis muscles.. The collection within the right gluteal muscle has unchanged while the fluid collection within the right iliacis muscle is increased somewhat in size.\" 2.7 cm  Cultures 3/29 and 4/4 neg  Improved pain control-able to ambulate  Repeat MRI on 5/20/2019 shows persistence of iliacus muscle  Discontinue all antibiotics and repeat drainage, AFB and fungal cultures  Repeat CHAS        Encephalopathy-improved  Multifactorial: meds, brain abscess  Aspiration and fall  precautions     Diabetes  Keep BS under 150 to help control current infection     H/o breast cancer  Ongoing concern that the lesions in her brain are metastatic in nature     DW IM Dr Mitchell            "

## 2019-05-23 ENCOUNTER — TELEPHONE (OUTPATIENT)
Dept: CARDIOLOGY | Facility: MEDICAL CENTER | Age: 69
End: 2019-05-23

## 2019-05-23 DIAGNOSIS — G93.9 LESION OF BRAIN: ICD-10-CM

## 2019-05-23 DIAGNOSIS — R50.9 FEVER, UNSPECIFIED FEVER CAUSE: ICD-10-CM

## 2019-05-23 RX ORDER — MIRTAZAPINE 15 MG/1
15 TABLET, FILM COATED ORAL NIGHTLY
Status: ON HOLD | COMMUNITY
End: 2019-05-29

## 2019-05-23 RX ORDER — ATORVASTATIN CALCIUM 10 MG/1
10 TABLET, FILM COATED ORAL NIGHTLY
Status: ON HOLD | COMMUNITY
End: 2019-05-29

## 2019-05-23 ASSESSMENT — ENCOUNTER SYMPTOMS
FEVER: 0
SHORTNESS OF BREATH: 0
COUGH: 0
VOMITING: 0
HEADACHES: 0
ABDOMINAL PAIN: 0
SORE THROAT: 0
CHILLS: 0
PALPITATIONS: 0
NAUSEA: 0
CONSTIPATION: 0

## 2019-05-24 ENCOUNTER — ANESTHESIA (OUTPATIENT)
Dept: SURGERY | Facility: MEDICAL CENTER | Age: 69
End: 2019-05-24
Payer: COMMERCIAL

## 2019-05-24 ENCOUNTER — APPOINTMENT (OUTPATIENT)
Dept: CARDIOLOGY | Facility: MEDICAL CENTER | Age: 69
End: 2019-05-24
Attending: INTERNAL MEDICINE
Payer: COMMERCIAL

## 2019-05-24 ENCOUNTER — HOSPITAL ENCOUNTER (OUTPATIENT)
Facility: MEDICAL CENTER | Age: 69
End: 2019-05-29
Attending: HOSPITALIST | Admitting: HOSPITALIST
Payer: COMMERCIAL

## 2019-05-24 ENCOUNTER — ANESTHESIA EVENT (OUTPATIENT)
Dept: SURGERY | Facility: MEDICAL CENTER | Age: 69
End: 2019-05-24
Payer: COMMERCIAL

## 2019-05-24 VITALS
BODY MASS INDEX: 23.68 KG/M2 | SYSTOLIC BLOOD PRESSURE: 145 MMHG | WEIGHT: 121.25 LBS | DIASTOLIC BLOOD PRESSURE: 67 MMHG | HEART RATE: 62 BPM | RESPIRATION RATE: 14 BRPM | OXYGEN SATURATION: 92 % | TEMPERATURE: 98.2 F

## 2019-05-24 LAB — LV EJECT FRACT  99904: 65

## 2019-05-24 PROCEDURE — 700111 HCHG RX REV CODE 636 W/ 250 OVERRIDE (IP): Performed by: ANESTHESIOLOGY

## 2019-05-24 PROCEDURE — 160048 HCHG OR STATISTICAL LEVEL 1-5: Performed by: INTERNAL MEDICINE

## 2019-05-24 PROCEDURE — 160002 HCHG RECOVERY MINUTES (STAT): Performed by: INTERNAL MEDICINE

## 2019-05-24 PROCEDURE — 160035 HCHG PACU - 1ST 60 MINS PHASE I: Performed by: INTERNAL MEDICINE

## 2019-05-24 PROCEDURE — 160009 HCHG ANES TIME/MIN: Performed by: INTERNAL MEDICINE

## 2019-05-24 PROCEDURE — 93312 ECHO TRANSESOPHAGEAL: CPT | Mod: 26 | Performed by: INTERNAL MEDICINE

## 2019-05-24 PROCEDURE — 160036 HCHG PACU - EA ADDL 30 MINS PHASE I: Performed by: INTERNAL MEDICINE

## 2019-05-24 PROCEDURE — 700105 HCHG RX REV CODE 258: Performed by: ANESTHESIOLOGY

## 2019-05-24 PROCEDURE — 700101 HCHG RX REV CODE 250: Performed by: ANESTHESIOLOGY

## 2019-05-24 PROCEDURE — 93325 DOPPLER ECHO COLOR FLOW MAPG: CPT

## 2019-05-24 RX ORDER — OXYCODONE HCL 5 MG/5 ML
5 SOLUTION, ORAL ORAL
Status: DISCONTINUED | OUTPATIENT
Start: 2019-05-24 | End: 2019-05-24 | Stop reason: HOSPADM

## 2019-05-24 RX ORDER — HYDROMORPHONE HYDROCHLORIDE 1 MG/ML
0.2 INJECTION, SOLUTION INTRAMUSCULAR; INTRAVENOUS; SUBCUTANEOUS
Status: DISCONTINUED | OUTPATIENT
Start: 2019-05-24 | End: 2019-05-24 | Stop reason: HOSPADM

## 2019-05-24 RX ORDER — SODIUM CHLORIDE, SODIUM LACTATE, POTASSIUM CHLORIDE, CALCIUM CHLORIDE 600; 310; 30; 20 MG/100ML; MG/100ML; MG/100ML; MG/100ML
INJECTION, SOLUTION INTRAVENOUS
Status: DISCONTINUED | OUTPATIENT
Start: 2019-05-24 | End: 2019-05-24 | Stop reason: SURG

## 2019-05-24 RX ORDER — HALOPERIDOL 5 MG/ML
1 INJECTION INTRAMUSCULAR
Status: DISCONTINUED | OUTPATIENT
Start: 2019-05-24 | End: 2019-05-24 | Stop reason: HOSPADM

## 2019-05-24 RX ORDER — OXYCODONE HCL 5 MG/5 ML
10 SOLUTION, ORAL ORAL
Status: DISCONTINUED | OUTPATIENT
Start: 2019-05-24 | End: 2019-05-24 | Stop reason: HOSPADM

## 2019-05-24 RX ORDER — MEPERIDINE HYDROCHLORIDE 25 MG/ML
25 INJECTION INTRAMUSCULAR; INTRAVENOUS; SUBCUTANEOUS
Status: DISCONTINUED | OUTPATIENT
Start: 2019-05-24 | End: 2019-05-24 | Stop reason: HOSPADM

## 2019-05-24 RX ORDER — ONDANSETRON 2 MG/ML
4 INJECTION INTRAMUSCULAR; INTRAVENOUS
Status: DISCONTINUED | OUTPATIENT
Start: 2019-05-24 | End: 2019-05-24 | Stop reason: HOSPADM

## 2019-05-24 RX ORDER — SODIUM CHLORIDE, SODIUM LACTATE, POTASSIUM CHLORIDE, CALCIUM CHLORIDE 600; 310; 30; 20 MG/100ML; MG/100ML; MG/100ML; MG/100ML
INJECTION, SOLUTION INTRAVENOUS CONTINUOUS
Status: DISCONTINUED | OUTPATIENT
Start: 2019-05-24 | End: 2019-05-24 | Stop reason: HOSPADM

## 2019-05-24 RX ORDER — HYDROMORPHONE HYDROCHLORIDE 1 MG/ML
0.4 INJECTION, SOLUTION INTRAMUSCULAR; INTRAVENOUS; SUBCUTANEOUS
Status: DISCONTINUED | OUTPATIENT
Start: 2019-05-24 | End: 2019-05-24 | Stop reason: HOSPADM

## 2019-05-24 RX ORDER — MIDAZOLAM HYDROCHLORIDE 1 MG/ML
0.5 INJECTION INTRAMUSCULAR; INTRAVENOUS
Status: DISCONTINUED | OUTPATIENT
Start: 2019-05-24 | End: 2019-05-24 | Stop reason: HOSPADM

## 2019-05-24 RX ORDER — HYDROMORPHONE HYDROCHLORIDE 1 MG/ML
0.1 INJECTION, SOLUTION INTRAMUSCULAR; INTRAVENOUS; SUBCUTANEOUS
Status: DISCONTINUED | OUTPATIENT
Start: 2019-05-24 | End: 2019-05-24 | Stop reason: HOSPADM

## 2019-05-24 RX ADMIN — LIDOCAINE HYDROCHLORIDE 50 MG: 20 INJECTION, SOLUTION INFILTRATION; PERINEURAL at 12:49

## 2019-05-24 RX ADMIN — PROPOFOL 80 MG: 10 INJECTION, EMULSION INTRAVENOUS at 12:49

## 2019-05-24 RX ADMIN — SODIUM CHLORIDE, POTASSIUM CHLORIDE, SODIUM LACTATE AND CALCIUM CHLORIDE: 600; 310; 30; 20 INJECTION, SOLUTION INTRAVENOUS at 12:45

## 2019-05-24 ASSESSMENT — ENCOUNTER SYMPTOMS
CHILLS: 0
NAUSEA: 0
HEADACHES: 0
SHORTNESS OF BREATH: 0
ABDOMINAL PAIN: 0
COUGH: 0
NAUSEA: 0
HEADACHES: 0
INSOMNIA: 0
FEVER: 0
PALPITATIONS: 0
DIZZINESS: 0
DIARRHEA: 0
VOMITING: 0
FEVER: 0
CONSTIPATION: 0
SORE THROAT: 0
COUGH: 0
DIARRHEA: 0
ABDOMINAL PAIN: 0
WHEEZING: 0
VOMITING: 0

## 2019-05-24 ASSESSMENT — PAIN SCALES - GENERAL: PAIN_LEVEL: 0

## 2019-05-24 NOTE — ANESTHESIA POSTPROCEDURE EVALUATION
Patient: Muriel Martini    Procedure Summary     Date:  05/24/19 Room / Location:   OR  / SURGERY [facility]    Anesthesia Start:  1245 Anesthesia Stop:      Procedure:  ECHOCARDIOGRAM, TRANSESOPHAGEAL Diagnosis:  (EMBOLIC PHENOMEOM)    Surgeon:  Christopher Potter M.D. Responsible Provider:  Jorge Hernandez M.D.    Anesthesia Type:  general ASA Status:  3          Final Anesthesia Type: general  Last vitals  BP   Blood Pressure : 148/64    Temp   36.2 °C (97.2 °F)    Pulse   Pulse: 73   Resp   16    SpO2   92 %      Anesthesia Post Evaluation    Patient location during evaluation: PACU  Patient participation: complete - patient participated  Level of consciousness: awake and alert  Pain score: 0    Airway patency: patent  Anesthetic complications: no  Cardiovascular status: hemodynamically stable  Respiratory status: acceptable  Hydration status: euvolemic    PONV: none           Nurse Pain Score: 4 (NPRS)

## 2019-05-24 NOTE — ANESTHESIA PREPROCEDURE EVALUATION
Relevant Problems   (+) COPD (chronic obstructive pulmonary disease) (HCC)   (+) Diabetes mellitus type 2 in obese (HCC)   (+) Essential hypertension   (+) GERD (gastroesophageal reflux disease)       Physical Exam    Airway   Mallampati: II  TM distance: >3 FB  Neck ROM: full       Cardiovascular - normal exam  Rhythm: regular  Rate: normal  (-) murmur     Dental - normal exam         Pulmonary - normal exam  Breath sounds clear to auscultation     Abdominal    Neurological - normal exam                 Anesthesia Plan    ASA 3       Plan - general       Airway plan will be mask        Induction: intravenous    Postoperative Plan: Postoperative administration of opioids is intended.    Pertinent diagnostic labs and testing reviewed    Informed Consent:    Anesthetic plan and risks discussed with patient.    Use of blood products discussed with: patient whom consented to blood products.

## 2019-05-24 NOTE — [facility]
Infectious Disease Progress Note    Author: Ashely Hinojosa M.D. Date & Time of service: 5/24/2019  3:28 PM    Chief Complaint:  Brain abscess, iliopsoas abscess, leukopenia     Interval History:   68 y.o. female admitted 4/30/2019. 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  Work-up revealed a right iliopsoas lesion, gluteal abscess, and mult brain abscesses  5/6 Interval 24 hours of Vitals/Labs/Micro,and imaging results reviewed as available. See assessment.   5/8 Interval 24 hours of Vitals/Labs/Micro,and imaging results reviewed as available. See assessment.   5/10 AF WBC 3 continued c/o constipation denies HA, visual changes, neuro deficits  5/11 AF WBC 8.8 isolation stopped due to resolution neutropenia-sleepy today  5/12 AF no CBC somnolent-no acute events noted  5/13 AF WBC 6.2 c/o pain left hip today, unchanged Worse with movement and improved with ice. Refused PT yesterday   5/14 AF c/o dizzyness whenever she tirns on her side-no new HA or visual changes-still havng hip pain  5/15 AF sleeping at time of rounds-by report ambulating  5/16 AF weightbearing continues to improve-ambulating more.  No new complaints  5/20- still has some pain in the hip but ambulating without any issues. No fevers. In good spirits.  5/22/2019 continues to have hip pain.  No fevers have been noted  5/24 AF no CBC plan for CHAS today. Denies any headaches. States she is ambulating    Review of Systems:  Review of Systems   Constitutional: Negative for chills and fever.   Respiratory: Negative for cough and wheezing.    Cardiovascular: Negative for chest pain.   Gastrointestinal: Negative for abdominal pain, diarrhea, nausea and vomiting.   Musculoskeletal: Negative for joint pain.   Neurological: Negative for dizziness and headaches.   All  "other systems reviewed and are negative.      Hemodynamics:  T 977.2 P73 RR 16 /64O2 saturation 92% on RA    Physical Exam:  Physical Exam   Constitutional: She is oriented to person, place, and time. No distress.   HENT:   Mouth/Throat: Oropharynx is clear and moist. No oropharyngeal exudate.   Eyes: Pupils are equal, round, and reactive to light. EOM are normal. Right eye exhibits no discharge. Left eye exhibits no discharge.   Neck: Neck supple. No JVD present.   Cardiovascular: Normal rate and regular rhythm.    Pulmonary/Chest: Effort normal. No stridor. No respiratory distress. She has no wheezes.   Abdominal: Soft. Bowel sounds are normal. There is no tenderness.   Musculoskeletal: She exhibits no edema.   RUE PICC line - no erythema     Neurological: She is alert and oriented to person, place, and time. She displays normal reflexes.   Skin: Skin is warm. No rash noted. She is not diaphoretic.   Psychiatric: She has a normal mood and affect. Her behavior is normal.   Vitals reviewed.    Labs:  Recent Labs      05/22/19   0206   WBC  6.8   RBC  4.84   HEMOGLOBIN  13.1   HEMATOCRIT  40.5   MCV  83.7   MCH  27.1   RDW  61.8*   PLATELETCT  243   MPV  10.7     Recent Labs      05/22/19   0206   SODIUM  129*   POTASSIUM  4.8   CHLORIDE  104   CO2  18*   GLUCOSE  85   BUN  19     Recent Labs      05/22/19   0206   ALBUMIN  3.4   TBILIRUBIN  0.5   ALKPHOSPHAT  186*   TOTPROTEIN  8.0   ALTSGPT  20   ASTSGOT  40   CREATININE  0.49*       Imaging:  No new imaging to review    Micro:  Results     ** No results found for the last 168 hours. **           ASSESSMENT/PLAN:   Brain abscesses  Afebrile  Original abscesses improved on therapy.   MRI 4/23 \"supra and infratentorial brain parenchyma... interval decrease in the size of the lesions. There are also interval reduction in the extent of the surrounding white matter edema in the restricted diffusion consistent with improvement/treatment response of the most of the " "multifocal brain abscesses.  New enhancing lesions in the left basal ganglia and right cerebellum  Mult blood cultures neg  CHAS neg 3/15  The repeat MRI shows persistent of the abscesses and increase in the number  She likely needs a biopsy  Abx discontinued  Repeat CHAS today     Gluteal and iliopsoas abscess s/p treatment  Adjacent to hardware in right hip (placed 12/17/18)  CT 4/23 \"Fluid collections within the right gluteal and iliacis muscles.. The collection within the right gluteal muscle has unchanged while the fluid collection within the right iliacis muscle is increased somewhat in size.\" 2.7 cm  Cultures 3/29 and 4/4 neg  Improved pain control  Ambulating without any issues  Repeat MRI on 5/20/2019 - interval decrease in collection in R gluteal muscle and persistent multiloculated collection in R iliacus muscule  Fluid collections not amenable to drainage given size  Abx discontinued  Repeat aspiration - planned for Monday 5/27  Repeat CHAS done with results pending    Encephalopathy-improved  Multifactorial: meds, brain abscess  Aspiration and fall  precautions     Type 2 DM  Keep BS under 150 to help control current infection     H/o breast cancer  Ongoing concern that the lesions in her brain are metastatic in nature              "

## 2019-05-24 NOTE — TELEPHONE ENCOUNTER
"[facility]  needs Anesthesia Protocol put on order for CHAS   Received: Today Message Contents   RE Degroot/Jaclyn Mac with [facility]  needs Anesthesia Protocol put on order for CHAS. If question's she can be reached at [phone].      Upon review of order form for CHAS, under additional orders it states, \"with Anesthesia.\"    Returned Bubba's phone call and reviewed findings.  She states per Gilbert Lawrence's protocol it needs to say \"Initiate Anesthesia Protocol.\" instead of \"with anesthesia\".  Reassurance given that orders will be modified.     Fax sent to [phone], completed status.    Fax sent to scanning for reference.   "

## 2019-05-24 NOTE — ANESTHESIA TIME REPORT
Anesthesia Start and Stop Event Times     Date Time Event    5/24/2019 1245 Anesthesia Start        Responsible Staff  05/24/19    Name Role Begin End    Jorge Hernandez M.D. Anesth 1245         Preop Diagnosis (Free Text):  Pre-op Diagnosis     EMBOLIC PHENOMEOM        Preop Diagnosis (Codes):  Diagnosis Information     Diagnosis Code(s):         Post op Diagnosis  Embolic phenomenon secondary to atrial fibrillation (HCC)  rule out    Premium Reason  Non-Premium    Comments:

## 2019-05-24 NOTE — [facility]
Hospital Medicine Progress Note, Adult, Complex               Author: Elvira Cox Date & Time created: 5/24/2019  5:35 AM     CC: brain abscesses    Interval History:  CHAS today  Pain from bed pain  Feels well otherwise  Did well with PT yesterday    Review of Systems:  Review of Systems   Constitutional: Negative for fever.   HENT: Negative for sore throat.    Respiratory: Negative for cough and shortness of breath.    Cardiovascular: Negative for chest pain and palpitations.   Gastrointestinal: Negative for abdominal pain, constipation, diarrhea, nausea and vomiting.   Genitourinary: Negative for dysuria.   Musculoskeletal: Positive for joint pain (hips).   Neurological: Negative for headaches.   Psychiatric/Behavioral: The patient does not have insomnia.        T 97.7P81BP 140/66 RR 18 SaO2 91% I/O1.3/700 BM 5/22  Physical Exam   Constitutional: She appears well-developed. No distress.   HENT:   Head: Normocephalic and atraumatic.   Eyes: Conjunctivae are normal. Right eye exhibits no discharge. Left eye exhibits no discharge. No scleral icterus.   Neck: No tracheal deviation present.   Cardiovascular: Normal rate, regular rhythm and intact distal pulses.    Pulmonary/Chest: Effort normal. No respiratory distress. She has no wheezes.   Abdominal: Soft. Bowel sounds are normal. She exhibits no distension. There is no tenderness.   Musculoskeletal: She exhibits no edema.   Neurological: She is alert.        Skin: Skin is warm.   Vitals reviewed.      Labs:          Recent Labs      05/22/19 0206   SODIUM  129*   POTASSIUM  4.8   CHLORIDE  104   CO2  18*   BUN  19   CREATININE  0.49*   CALCIUM  10.2     Recent Labs      05/22/19 0206   ALTSGPT  20   ASTSGOT  40   ALKPHOSPHAT  186*   TBILIRUBIN  0.5   GLUCOSE  85     Recent Labs      05/22/19 0206   RBC  4.84   HEMOGLOBIN  13.1   HEMATOCRIT  40.5   PLATELETCT  243     Recent Labs      05/22/19 0206   WBC  6.8   ASTSGOT  40   ALTSGPT  20   ALKPHOSPHAT   186*   TBILIRUBIN  0.5          GI/Nutrition:  Orders Placed This Encounter   Procedures   • Diet NPO     Standing Status:   Standing     Number of Occurrences:   8     Order Specific Question:   Restrict to:     Answer:   Strict [1]     MRI brain 5/20  1.  Increase in number of innumerable small ovoid enhancing lesions throughout the brain parenchyma. These lesions may represent microabscesses of either bacterial or fungal origin or possibly brain metastases.    2.  Age-related cerebral atrophy.    MRI pelvis 5/20    1. Interval decrease in size of the collection in the right gluteal medius muscle, measuring 1.4 x 1.9 cm, previously 2.3 x 2.8 cm. There is minimal surrounding stranding.    Mild heterogeneity and stranding in the overlying right gluteus cornelio muscle without discrete collection, could relate to reactive change or phlegmon.    2. Grossly unchanged in size of the multiloculated collection in the right iliacus muscle, measuring 2.4 x 3.5 cm.    Medical Decision Making, by Problem:  Brain abscesses vs. metastatic disease   -Vanco, cefepime, flagyl stopped 5/23   -repeat MRI with progression   -too small to sample per IR   -Repeat CHAS P  R gluteal/iliac abscess   -MRI with improved R gluteus abscess, unchanged R iliacus abscess (request CT aspiration-need to hold xarelto 2 days prior so won't happen until monday)   -continue idopatch/voltaren gel  H/o sacral fx and pinning   -pin unable to removed until healed (sometime this summer)   -F/u with Dr. Porter next week for timing of removal  H/o breast cancer with reconstruction  DM2   -metformin  RLS   -mirapex   H/o DVT   -xarelto, hold starting Saturday for CT aspiration  Neutropenia-resolved   -granix 5/10  R lung pulm nodule   -outpatient f/u  HTN   -norvasc, cozaar, metoprolol  HLD   -lipitor  GERD  Hyponatremia  Hypercalcemia   -sensipar  Debility   -PT    Updated brother yesterday    Quality-Core Measures   Reviewed items::  Medications  reviewed  Sue catheter::  No Sue  Central line in place:  Need for access  DVT: xarelto.

## 2019-05-24 NOTE — ANESTHESIA QCDR
2019 [facility] Clinical Data Registry (for Quality Improvement)     Postoperative nausea/vomiting risk protocol (Adult = 18 yrs and Pediatric 3-17 yrs)- (430 and 463)  General inhalation anesthetic (NOT TIVA) with PONV risk factors: No  Provision of anti-emetic therapy with at least 2 different classes of agents: N/A  Patient DID NOT receive anti-emetic therapy and reason is documented in Medical Record: N/A    Multimodal Pain Management- (AQI59)  Patient undergoing Elective Surgery (i.e. Outpatient, or ASC, or Prescheduled Surgery prior to Hospital Admission): No  Use of Multimodal Pain Management, two or more drugs and/or interventions, NOT including systemic opioids: N/A  Exception: Documented allergy to multiple classes of analgesics: N/A    PACU assessment of acute postoperative pain prior to Anesthesia Care End- Applies to Patients Age = 18- (ABG7)  Initial PACU pain score is which of the following: < 7/10  Patient unable to report pain score: N/A    Post-anesthetic transfer of care checklist/protocol to PACU/ICU- (426 and 427)  Upon conclusion of case, patient transferred to which of the following locations: PACU/Non-ICU  Use of transfer checklist/protocol: Yes  Exclusion: Service Performed in Patient Hospital Room (and thus did not require transfer): N/A    PACU Reintubation- (AQI31)  General anesthesia requiring endotracheal intubation (ETT) along with subsequent extubation in OR or PACU: No  Required reintubation in the PACU: N/A  Extubation was a planned trial documented in the medical record prior to removal of the original airway device: N/A    Unplanned admission to ICU related to anesthesia service up through end of PACU care- (MD51)  Unplanned admission to ICU (not initially anticipated at anesthesia start time): No

## 2019-05-24 NOTE — OR NURSING
1256- Patient to PACU from procedure room following CHAS with sedation. Bite block in place upon arrival to PACU. Respirations noted as even and unlabored.     1305- Bite block removed. Patient assisted with repositioning in bed. Patient resting.     1315- Patient denies the need for pain or nausea intervention at this time     1325- Apple juice given per patient request and patient able to tolerate without difficulty.     1340- Patient asleep in bed.     1355- Patient asleep in bed.     1410- Patient awake and denies needs pain or nausea intervention    1420- Patient ready for transfer back to room. Report called.     1426- Patient assisted to wheelchair and transferred back to room at [facility]

## 2019-05-24 NOTE — H&P
CC: here for CHAS    HPI: 69 yo known to me from prior TEEin March [facility] for cardioembolism with changes in brain MRI completed ABx    Pt has no new complaints is here in [facility] for rehab    Medications / Drug list prior to admission:  No current facility-administered medications on file prior to encounter.      Current Outpatient Prescriptions on File Prior to Encounter   Medication Sig Dispense Refill   • loperamide (IMODIUM) 2 MG Cap Take 1 Cap by mouth 4 times a day as needed for Diarrhea. 30 Cap 0   • hydrALAZINE (APRESOLINE) 20 MG/ML Solution 1 mL by Intravenous route every 6 hours as needed. 1 Vial 0   • labetalol (NORMODYNE) 100 MG Tab Take 1 Tab by mouth every 8 hours as needed (SBP >165 or DBP >100). 60 Tab 0   • amLODIPine (NORVASC) 10 MG Tab Take 1 Tab by mouth every day. (Patient taking differently: Take 5 mg by mouth every day.) 30 Tab 0   • cinacalcet 60 MG Tab Take 1 Tab by mouth every day. 30 Tab 0   • ferrous sulfate 325 (65 Fe) MG tablet Take 1 Tab by mouth every morning with breakfast. 30 Tab 0   • gabapentin (NEURONTIN) 300 MG Cap Take 300 mg by mouth 2 Times a Day.     • losartan (COZAAR) 50 MG Tab Take 50 mg by mouth every day.     • pramipexole (MIRAPEX) 0.25 MG Tab Take 0.5 mg by mouth as needed.     • metFORMIN (GLUCOPHAGE) 500 MG Tab Take 500 mg by mouth 2 times a day, with meals.     • rivaroxaban (XARELTO) 15 MG Tab tablet Take 15 mg by mouth every day.         Current list of administered Medications:  No current facility-administered medications for this encounter.     Past Medical History:   Diagnosis Date   • Cancer (HCC)    • COPD (chronic obstructive pulmonary disease) (HCC)    • Diabetes (HCC)     Record received from [facility] 12/14/18   • GERD (gastroesophageal reflux disease)     Record received from [facility] 12/14/18   • Hyperlipidemia     Record received from [facility] 12/14/18   • Hypertension     Record received from [facility] 12/14/18       Past Surgical  History:   Procedure Laterality Date   • SI SCREW Right 12/17/2018    Procedure: SI SCREW;  Surgeon: Cody Porter M.D.;  Location: SURGERY [facility];  Service: Orthopedics   • OTHER      Bilateral Masectomy        History reviewed. No pertinent family history.  Patient family history was personally reviewed, no pertinent family history to current presentation    Social History     Social History   • Marital status:      Spouse name: N/A   • Number of children: N/A   • Years of education: N/A     Occupational History   • Not on file.     Social History Main Topics   • Smoking status: Never Smoker   • Smokeless tobacco: Never Used   • Alcohol use Yes      Comment: socially    • Drug use: No   • Sexual activity: Not on file     Other Topics Concern   • Ok To Release Patient Information To The Following Yes     Arsh - Brother      Social History Narrative   • No narrative on file       ALLERGIES:  No Known Allergies    Review of systems:  A complete review of symptoms was reviewed with patient. This is reviewed in H&P and PMH. ALL OTHERS reviewed and negative    Physical exam:  Patient Vitals for the past 24 hrs:   BP Temp Temp src Pulse Resp SpO2 Weight   05/24/19 1126 148/64 36.2 °C (97.2 °F) Temporal 73 16 92 % 55 kg (121 lb 4.1 oz)       General: No acute distress.   EYES: no jaundice  HEENT: OP clear   Neck: No bruits No JVD.   CVS: RRR. S1 + S2. No M/R/G  Resp: CTAB. No wheezing or crackles/rhonchi.  Abdomen: Soft, NT, ND,  Skin: Grossly nothing acute no obvious rashes  Neurological: Alert, Moves all extremities, no cranial nerve defects on limited exam  Extremities: Pulse 2+ in b/l LE. No edema. No cyanosis.       Data:  Laboratory studies personally reviewed by me:  No results found for this or any previous visit (from the past 24 hour(s)).    Imaging:  No orders to display           All pertinent features of laboratory and imaging reviewed including primary images where  applicable      Active Problems:    * No active hospital problems. *  Resolved Problems:    * No resolved hospital problems. *      Assessment / Plan:  Embolic phenomenon repeat TEEE after completeion of abx arranged    The risks, benefits, and alternatives to transesophageal echocardiogram (CHAS) with IV sedation were discussed with the patient in specific detail, including oropharyngeal and esophageal traumas including hoarseness and dysphagia after the procedure. Rare cases demonstrating serious or fatal complications associated with CHAS have been reported in the adult population, including cardiac, pulmonary and bleeding complications in less than 1% of people. Patients with an identified intracardiac thrombus are at increased risk for embolic events (absolute risk uncertain, range 0%-38%), and this appears to be reduced with anticoagulation therapy (absolute risk reduction uncertain). The patient verbalized understanding about these possible complications, and wishes to proceed with this procedure.          I personally discussed her case with  CELIO Kimble.*    No future appointments.    It is my pleasure to participate in the care of Ms. Martini.  Please do not hesitate to contact me with questions or concerns.    Christopher Potter MD PhD [facility]  Cardiologist [facility]    5/24/2019    Please note that this dictation was created using voice recognition software. I have worked with consultants from the vendor as well as technical experts from [facility] to optimize the interface. I have made every reasonable attempt to correct obvious errors, but I expect that there are errors of grammar and possibly content I did not discover before finalizing the note.

## 2019-05-25 ASSESSMENT — ENCOUNTER SYMPTOMS
ABDOMINAL PAIN: 0
NAUSEA: 0
INSOMNIA: 0
FEVER: 0
DIZZINESS: 0
CONSTIPATION: 0
WHEEZING: 0
VOMITING: 0
SHORTNESS OF BREATH: 0
PALPITATIONS: 0
CHILLS: 0
SORE THROAT: 0
COUGH: 0
DIARRHEA: 0
HEADACHES: 0

## 2019-05-25 NOTE — [facility]
Hospital Medicine Progress Note, Adult, Complex               Author: Elvira Cox Date & Time created: 5/25/2019  8:17 AM     CC: brain abscesses    Interval History:  CHAS negative  Feels well  Updated patient's brother    Review of Systems:  Review of Systems   Constitutional: Negative for chills and fever.   HENT: Negative for sore throat.    Respiratory: Negative for cough and shortness of breath.    Cardiovascular: Negative for chest pain and palpitations.   Gastrointestinal: Negative for abdominal pain, constipation, diarrhea, nausea and vomiting.   Genitourinary: Negative for dysuria.   Musculoskeletal: Positive for joint pain (hips).   Neurological: Negative for headaches.   Psychiatric/Behavioral: The patient does not have insomnia.        T 97.6P72BP 141/64 RR 18 SaO2 93% I/O960/1.9 BM 5/24  Physical Exam   Constitutional: She is oriented to person, place, and time. She appears well-developed. No distress.   HENT:   Head: Normocephalic and atraumatic.   Eyes: Conjunctivae are normal. Right eye exhibits no discharge. Left eye exhibits no discharge. No scleral icterus.   Neck: No tracheal deviation present.   Cardiovascular: Normal rate, regular rhythm and intact distal pulses.    Pulmonary/Chest: Effort normal. No respiratory distress. She has no wheezes.   Abdominal: Soft. Bowel sounds are normal. She exhibits no distension. There is no tenderness.   Musculoskeletal: She exhibits no edema.   Neurological: She is alert and oriented to person, place, and time.        Skin: Skin is warm.   Vitals reviewed.      Labs:          No results for input(s): SODIUM, POTASSIUM, CHLORIDE, CO2, BUN, CREATININE, MAGNESIUM, PHOSPHORUS, CALCIUM in the last 72 hours.  No results for input(s): ALTSGPT, ASTSGOT, ALKPHOSPHAT, TBILIRUBIN, DBILIRUBIN, GAMMAGT, AMYLASE, LIPASE, ALB, PREALBUMIN, GLUCOSE in the last 72 hours.  No results for input(s): RBC, HEMOGLOBIN, HEMATOCRIT, PLATELETCT, PROTHROMBTM, APTT, INR, IRON,  FERRITIN, TOTIRONBC in the last 72 hours.           GI/Nutrition:  Orders Placed This Encounter   Procedures   • Diet Order Diabetic     Standing Status:   Standing     Number of Occurrences:   1     Order Specific Question:   Diet:     Answer:   Diabetic [3]     Order Specific Question:   Texture/Fiber modifications:     Answer:   Dysphagia 3(Mechanical Soft)specify fluid consistency(question 6) [3]     Order Specific Question:   Consistency/Fluid modifications:     Answer:   Thin Liquids [3]     MRI brain 5/20  1.  Increase in number of innumerable small ovoid enhancing lesions throughout the brain parenchyma. These lesions may represent microabscesses of either bacterial or fungal origin or possibly brain metastases.    2.  Age-related cerebral atrophy.    MRI pelvis 5/20    1. Interval decrease in size of the collection in the right gluteal medius muscle, measuring 1.4 x 1.9 cm, previously 2.3 x 2.8 cm. There is minimal surrounding stranding.    Mild heterogeneity and stranding in the overlying right gluteus cornelio muscle without discrete collection, could relate to reactive change or phlegmon.    2. Grossly unchanged in size of the multiloculated collection in the right iliacus muscle, measuring 2.4 x 3.5 cm.    Medical Decision Making, by Problem:  Brain abscesses vs. metastatic disease   -Vanco, cefepime, flagyl stopped 5/23   -repeat MRI with progression   -too small to sample per IR   -Repeat CHAS negative  R gluteal/iliac abscess   -MRI with improved R gluteus abscess, unchanged R iliacus abscess (request CT aspiration-need to hold xarelto 2 days prior so won't happen until monday)   -continue idopatch/voltaren gel  H/o sacral fx and pinning   -pin unable to removed until healed (sometime this summer)   -F/u with Dr. Porter next week for timing of removal  H/o breast cancer with reconstruction  DM2   -metformin  RLS   -mirapex   H/o DVT   -xarelto, hold starting Saturday for CT  aspiration  Neutropenia-resolved   -granix 5/10  R lung pulm nodule   -outpatient f/u  HTN   -norvasc, cozaar, metoprolol  HLD   -lipitor  GERD  Hyponatremia  Hypercalcemia   -sensipar  Debility   -PT        Quality-Core Measures   Reviewed items::  Medications reviewed  Sue catheter::  No Sue  Central line in place:  Need for access  DVT: xarelto on hold for procedure.

## 2019-05-25 NOTE — [facility]
Infectious Disease Progress Note    Author: Ashely Hinojosa M.D. Date & Time of service: 5/25/2019  8:11 AM    Chief Complaint:  Brain abscess, iliopsoas abscess, leukopenia     Interval History:   68 y.o. female admitted 4/30/2019. 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 on 03/08/2019 as a transfer from an outside hospital for right hip and pelvic pain.  Work-up revealed a right iliopsoas lesion, gluteal abscess, and mult brain abscesses  5/6 Interval 24 hours of Vitals/Labs/Micro,and imaging results reviewed as available. See assessment.   5/8 Interval 24 hours of Vitals/Labs/Micro,and imaging results reviewed as available. See assessment.   5/10 AF WBC 3 continued c/o constipation denies HA, visual changes, neuro deficits  5/11 AF WBC 8.8 isolation stopped due to resolution neutropenia-sleepy today  5/12 AF no CBC somnolent-no acute events noted  5/13 AF WBC 6.2 c/o pain left hip today, unchanged Worse with movement and improved with ice. Refused PT yesterday   5/14 AF c/o dizzyness whenever she tirns on her side-no new HA or visual changes-still havng hip pain  5/15 AF sleeping at time of rounds-by report ambulating  5/16 AF weightbearing continues to improve-ambulating more.  No new complaints  5/20- still has some pain in the hip but ambulating without any issues. No fevers. In good spirits.  5/22/2019 continues to have hip pain.  No fevers have been noted  5/24 AF no CBC plan for CHAS today. Denies any headaches. States she is ambulating  5/25 afebrile CBC not done today.  Patient CHAS was negative.  She has a poor appetite and is requesting an appetite stimulant.  Ongoing left hip pain.  Plan for CT-guided drainage tomorrow    Review of Systems:  Review of Systems   Constitutional: Negative for chills and fever.   Respiratory: Negative for cough and wheezing.    Cardiovascular: Negative for chest  "pain.   Gastrointestinal: Negative for abdominal pain, diarrhea, nausea and vomiting.   Musculoskeletal: Positive for joint pain.        Left hip   Neurological: Negative for dizziness and headaches.   All other systems reviewed and are negative.      Hemodynamics:  T 97.2 P73 RR 16 /64O2 saturation 92% on RA    Physical Exam:  Physical Exam   Constitutional: She is oriented to person, place, and time. No distress.   HENT:   Mouth/Throat: Oropharynx is clear and moist. No oropharyngeal exudate.   Eyes: Pupils are equal, round, and reactive to light. EOM are normal. Right eye exhibits no discharge. Left eye exhibits no discharge.   Neck: Neck supple. No JVD present.   Cardiovascular: Normal rate and regular rhythm.    Pulmonary/Chest: Effort normal. No stridor. No respiratory distress. She has no wheezes.   Abdominal: Soft. Bowel sounds are normal. There is no tenderness.   Musculoskeletal: She exhibits no edema.   RUE PICC line - no erythema     Neurological: She is alert and oriented to person, place, and time. She displays normal reflexes.   Skin: Skin is warm. No rash noted. She is not diaphoretic.   Psychiatric: She has a normal mood and affect. Her behavior is normal.   Vitals reviewed.    Labs:  No results for input(s): WBC, RBC, HEMOGLOBIN, HEMATOCRIT, MCV, MCH, RDW, PLATELETCT, MPV, NEUTSPOLYS, LYMPHOCYTES, MONOCYTES, EOSINOPHILS, BASOPHILS, RBCMORPHOLO in the last 72 hours.  No results for input(s): SODIUM, POTASSIUM, CHLORIDE, CO2, GLUCOSE, BUN, CPKTOTAL in the last 72 hours.  No results for input(s): ALBUMIN, TBILIRUBIN, ALKPHOSPHAT, TOTPROTEIN, ALTSGPT, ASTSGOT, CREATININE in the last 72 hours.    Imaging:  No new imaging to review    Micro:  Results     ** No results found for the last 168 hours. **           ASSESSMENT/PLAN:   Brain abscesses  Afebrile  Original abscesses improved on therapy.   MRI 4/23 \"supra and infratentorial brain parenchyma... interval decrease in the size of the lesions. " "There are also interval reduction in the extent of the surrounding white matter edema in the restricted diffusion consistent with improvement/treatment response of the most of the multifocal brain abscesses.  New enhancing lesions in the left basal ganglia and right cerebellum  Mult blood cultures neg  CHAS neg 3/15  The repeat MRI shows persistent of the abscesses and increase in the number  She likely needs a biopsy  Abx discontinued  Repeat CHAS on 5/24-negative    Gluteal and iliopsoas abscess s/p treatment.  Additional work-up ongoing  Adjacent to hardware in right hip (placed 12/17/18)  CT 4/23 \"Fluid collections within the right gluteal and iliacis muscles.. The collection within the right gluteal muscle has unchanged while the fluid collection within the right iliacis muscle is increased somewhat in size.\" 2.7 cm  Cultures 3/29 and 4/4 neg  Improved pain control  Ambulating without any issues  Repeat MRI on 5/20/2019 - interval decrease in collection in R gluteal muscle and persistent multiloculated collection in R iliacus muscule  Fluid collections not amenable to drainage given size  Abx discontinued  Repeat aspiration - planned for Monday 5/27.  Please send fluid for culture    Type 2 DM  Keep BS under 150 to help control current infection     H/o breast cancer  Ongoing concern that the lesions in her brain are metastatic in nature    I have performed a physical exam and reviewed and updated ROS and plan today 5/25/2019.  In review of yesterday's note 5/24/2019, there are no changes except as documented above.              "

## 2019-05-26 LAB
ALBUMIN SERPL BCP-MCNC: 3.1 G/DL (ref 3.2–4.9)
ALBUMIN/GLOB SERPL: 0.7 G/DL
ALP SERPL-CCNC: 206 U/L (ref 30–99)
ALT SERPL-CCNC: 24 U/L (ref 2–50)
ANION GAP SERPL CALC-SCNC: 8 MMOL/L (ref 0–11.9)
AST SERPL-CCNC: 46 U/L (ref 12–45)
BILIRUB SERPL-MCNC: 0.4 MG/DL (ref 0.1–1.5)
BUN SERPL-MCNC: 29 MG/DL (ref 8–22)
CA-I SERPL-SCNC: 1.36 MMOL/L (ref 1.1–1.3)
CALCIUM SERPL-MCNC: 10.8 MG/DL (ref 8.4–10.2)
CHLORIDE SERPL-SCNC: 104 MMOL/L (ref 96–112)
CO2 SERPL-SCNC: 19 MMOL/L (ref 20–33)
CREAT SERPL-MCNC: 0.52 MG/DL (ref 0.5–1.4)
ERYTHROCYTE [DISTWIDTH] IN BLOOD BY AUTOMATED COUNT: 62.9 FL (ref 35.9–50)
GLOBULIN SER CALC-MCNC: 4.3 G/DL (ref 1.9–3.5)
GLUCOSE SERPL-MCNC: 85 MG/DL (ref 65–99)
HCT VFR BLD AUTO: 39.6 % (ref 37–47)
HGB BLD-MCNC: 12.7 G/DL (ref 12–16)
MCH RBC QN AUTO: 27.4 PG (ref 27–33)
MCHC RBC AUTO-ENTMCNC: 32.1 G/DL (ref 33.6–35)
MCV RBC AUTO: 85.3 FL (ref 81.4–97.8)
PLATELET # BLD AUTO: 265 K/UL (ref 164–446)
PMV BLD AUTO: 10.4 FL (ref 9–12.9)
POTASSIUM SERPL-SCNC: 5.2 MMOL/L (ref 3.6–5.5)
PROT SERPL-MCNC: 7.4 G/DL (ref 6–8.2)
PTH-INTACT SERPL-MCNC: 3.7 PG/ML (ref 14–72)
RBC # BLD AUTO: 4.64 M/UL (ref 4.2–5.4)
SODIUM SERPL-SCNC: 131 MMOL/L (ref 135–145)
WBC # BLD AUTO: 5.4 K/UL (ref 4.8–10.8)

## 2019-05-26 PROCEDURE — 82330 ASSAY OF CALCIUM: CPT

## 2019-05-26 PROCEDURE — 80053 COMPREHEN METABOLIC PANEL: CPT

## 2019-05-26 PROCEDURE — 85027 COMPLETE CBC AUTOMATED: CPT

## 2019-05-26 PROCEDURE — 83970 ASSAY OF PARATHORMONE: CPT

## 2019-05-26 ASSESSMENT — ENCOUNTER SYMPTOMS
SORE THROAT: 0
FEVER: 0
VOMITING: 0
SHORTNESS OF BREATH: 0
INSOMNIA: 0
NAUSEA: 0
ABDOMINAL PAIN: 0
COUGH: 0
DIARRHEA: 0
PALPITATIONS: 0
CONSTIPATION: 0
HEADACHES: 0

## 2019-05-26 NOTE — [facility]
Hospital Medicine Progress Note, Adult, Complex               Author: Elvira Cox Date & Time created: 5/26/2019  8:41 AM     CC: brain abscesses    Interval History:  R chest wall sore, thinks it's from pulling herself up in bed with rails  Doesn't want nystatin anymore  Denies shortness of breath, nausea, vomiting, abdominal pain, chest pain  Ionized calcium high    Review of Systems:  Review of Systems   Constitutional: Negative for fever.   HENT: Negative for sore throat.    Respiratory: Negative for cough and shortness of breath.    Cardiovascular: Negative for chest pain and palpitations.   Gastrointestinal: Negative for abdominal pain, constipation, diarrhea, nausea and vomiting.   Genitourinary: Negative for dysuria.   Musculoskeletal: Positive for joint pain (hips).        R chest wall pain   Skin: Negative for rash.   Neurological: Negative for headaches.   Psychiatric/Behavioral: The patient does not have insomnia.        T 97.8P73BP 139/61 RR 18 SaO2 96% I/O1.8/1.7 BM 5/25  Physical Exam   Constitutional: She is oriented to person, place, and time. She appears well-developed. No distress.   HENT:   Head: Normocephalic and atraumatic.   Eyes: Conjunctivae are normal. Right eye exhibits no discharge. Left eye exhibits no discharge. No scleral icterus.   Neck: No tracheal deviation present.   Cardiovascular: Normal rate, regular rhythm and intact distal pulses.    Pulmonary/Chest: Effort normal. No respiratory distress. She has no wheezes. She exhibits tenderness (R chest wall, breast implant intact, no mass felt).   Abdominal: Soft. Bowel sounds are normal. She exhibits no distension. There is no tenderness.   Musculoskeletal: She exhibits no edema.   Neurological: She is alert and oriented to person, place, and time.        Skin: Skin is warm.   Vitals reviewed.      Labs:          Recent Labs      05/26/19   0233   SODIUM  131*   POTASSIUM  5.2   CHLORIDE  104   CO2  19*   BUN  29*   CREATININE   0.52   CALCIUM  10.8*     Recent Labs      05/26/19   0233   ALTSGPT  24   ASTSGOT  46*   ALKPHOSPHAT  206*   TBILIRUBIN  0.4   GLUCOSE  85     Recent Labs      05/26/19   0233   RBC  4.64   HEMOGLOBIN  12.7   HEMATOCRIT  39.6   PLATELETCT  265     Recent Labs      05/26/19   0233   WBC  5.4   ASTSGOT  46*   ALTSGPT  24   ALKPHOSPHAT  206*   TBILIRUBIN  0.4          GI/Nutrition:  Orders Placed This Encounter   Procedures   • Diet Order Diabetic     Standing Status:   Standing     Number of Occurrences:   1     Order Specific Question:   Diet:     Answer:   Diabetic [3]     Order Specific Question:   Texture/Fiber modifications:     Answer:   Dysphagia 3(Mechanical Soft)specify fluid consistency(question 6) [3]     Order Specific Question:   Consistency/Fluid modifications:     Answer:   Thin Liquids [3]     MRI brain 5/20  1.  Increase in number of innumerable small ovoid enhancing lesions throughout the brain parenchyma. These lesions may represent microabscesses of either bacterial or fungal origin or possibly brain metastases.    2.  Age-related cerebral atrophy.    MRI pelvis 5/20    1. Interval decrease in size of the collection in the right gluteal medius muscle, measuring 1.4 x 1.9 cm, previously 2.3 x 2.8 cm. There is minimal surrounding stranding.    Mild heterogeneity and stranding in the overlying right gluteus cornelio muscle without discrete collection, could relate to reactive change or phlegmon.    2. Grossly unchanged in size of the multiloculated collection in the right iliacus muscle, measuring 2.4 x 3.5 cm.    Medical Decision Making, by Problem:  Brain abscesses vs. metastatic disease   -Vanco, cefepime, flagyl stopped 5/23   -repeat MRI with progression   -too small to sample per IR   -Repeat CHAS negative  R gluteal/iliac abscess   -MRI with improved R gluteus abscess, unchanged R iliacus abscess (request CT aspiration-need to hold xarelto 2 days prior so won't happen until monday)   -continue  idopatch/voltaren gel  H/o sacral fx and pinning   -pin unable to removed until healed (sometime this summer)   -F/u with Dr. Porter next week for timing of removal  H/o breast cancer with reconstruction   -chest wall pain appears musculoskeletal   -trial voltaren gel   -no exam findings of implant rupture, infection, mass  DM2   -metformin  RLS   -mirapex   H/o DVT   -xarelto, hold starting Saturday for CT aspiration   -add lovenox bid  Neutropenia-resolved   -granix 5/10  R lung pulm nodule   -outpatient f/u  HTN   -norvasc, cozaar, metoprolol  HLD   -lipitor  GERD  Hyponatremia  Hypercalcemia (diagnosis on admit)   -increase sensipar to 90mg   -follow up PMD  Debility   -PT        Quality-Core Measures   Reviewed items::  Medications reviewed and Labs reviewed  Sue catheter::  No Sue  Central line in place:  Need for access  DVT: xarelto on hold for procedure.

## 2019-05-27 ENCOUNTER — APPOINTMENT (OUTPATIENT)
Dept: RADIOLOGY | Facility: MEDICAL CENTER | Age: 69
End: 2019-05-27
Attending: HOSPITALIST
Payer: COMMERCIAL

## 2019-05-27 PROCEDURE — 76604 US EXAM CHEST: CPT

## 2019-05-27 ASSESSMENT — ENCOUNTER SYMPTOMS
SORE THROAT: 0
FEVER: 0
COUGH: 0
CONSTIPATION: 0
INSOMNIA: 0
SHORTNESS OF BREATH: 0
DIARRHEA: 0
ABDOMINAL PAIN: 0
PALPITATIONS: 0
VOMITING: 0
HEADACHES: 0
NAUSEA: 0

## 2019-05-27 NOTE — [facility]
Hospital Medicine Progress Note, Adult, Complex               Author: Elvira GARDENIA Gilberten Date & Time created: 5/27/2019  8:17 AM     CC: brain abscesses    Interval History:  sensipar increased  No change in R breast/chest wall pain  Eating breakfast  CT machine broken, hoping for repair by tomorrow or may need to go to main for drain    Review of Systems:  Review of Systems   Constitutional: Negative for fever.   HENT: Negative for sore throat.    Respiratory: Negative for cough and shortness of breath.    Cardiovascular: Negative for chest pain and palpitations.   Gastrointestinal: Negative for abdominal pain, constipation, diarrhea, nausea and vomiting.   Genitourinary: Negative for dysuria.   Musculoskeletal: Positive for joint pain (hips).        R chest wall/breast pain   Skin: Negative for rash.   Neurological: Negative for headaches.   Psychiatric/Behavioral: The patient does not have insomnia.        T 99.2P81BP 121/63RR 18 SaO2 92% I/O1.3/brp BM 5/26  Physical Exam   Constitutional: She is oriented to person, place, and time. She appears well-developed. No distress.   HENT:   Head: Normocephalic and atraumatic.   Eyes: Conjunctivae are normal. Right eye exhibits no discharge. Left eye exhibits no discharge. No scleral icterus.   Neck: No tracheal deviation present.   Cardiovascular: Normal rate, regular rhythm and intact distal pulses.    Pulmonary/Chest: Effort normal. No respiratory distress. She has no wheezes. She exhibits tenderness (R chest wall, breast implant, no mass felt, no erythema/warmth).   Abdominal: Soft. Bowel sounds are normal. She exhibits no distension. There is no tenderness.   Musculoskeletal: She exhibits no edema.   Neurological: She is alert and oriented to person, place, and time.        Skin: Skin is warm.   Vitals reviewed.      Labs:          Recent Labs      05/26/19   0233   SODIUM  131*   POTASSIUM  5.2   CHLORIDE  104   CO2  19*   BUN  29*   CREATININE  0.52   CALCIUM  10.8*      Recent Labs      05/26/19   0233   ALTSGPT  24   ASTSGOT  46*   ALKPHOSPHAT  206*   TBILIRUBIN  0.4   GLUCOSE  85     Recent Labs      05/26/19   0233   RBC  4.64   HEMOGLOBIN  12.7   HEMATOCRIT  39.6   PLATELETCT  265     Recent Labs      05/26/19   0233   WBC  5.4   ASTSGOT  46*   ALTSGPT  24   ALKPHOSPHAT  206*   TBILIRUBIN  0.4          GI/Nutrition:  Orders Placed This Encounter   Procedures   • Diet Order Diabetic     Standing Status:   Standing     Number of Occurrences:   1     Order Specific Question:   Diet:     Answer:   Diabetic [3]     Order Specific Question:   Texture/Fiber modifications:     Answer:   Dysphagia 3(Mechanical Soft)specify fluid consistency(question 6) [3]     Order Specific Question:   Consistency/Fluid modifications:     Answer:   Thin Liquids [3]     MRI brain 5/20  1.  Increase in number of innumerable small ovoid enhancing lesions throughout the brain parenchyma. These lesions may represent microabscesses of either bacterial or fungal origin or possibly brain metastases.    2.  Age-related cerebral atrophy.    MRI pelvis 5/20    1. Interval decrease in size of the collection in the right gluteal medius muscle, measuring 1.4 x 1.9 cm, previously 2.3 x 2.8 cm. There is minimal surrounding stranding.    Mild heterogeneity and stranding in the overlying right gluteus cornelio muscle without discrete collection, could relate to reactive change or phlegmon.    2. Grossly unchanged in size of the multiloculated collection in the right iliacus muscle, measuring 2.4 x 3.5 cm.    Medical Decision Making, by Problem:  Brain abscesses vs. metastatic disease   -Vanco, cefepime, flagyl stopped 5/23   -repeat MRI with progression   -too small to sample per IR   -Repeat CHAS negative   -d/w onc-no benefit of pet scan  R gluteal/iliac abscess   -MRI with improved R gluteus abscess, unchanged R iliacus abscess-CT drainage P   -continue idopatch/voltaren gel  H/o sacral fx and pinning   -pin unable  to removed until healed (sometime this summer)   -F/u with Dr. Porter this week for timing of removal  H/o breast cancer with reconstruction   -check US   -avoiding CT due to frequent radiation    -no exam findings of implant rupture, infection, mass  DM2   -metformin  RLS   -mirapex   H/o DVT   -xarelto, held (last dose 5/24) for CT aspiration   -lovenox bid  Neutropenia-resolved   -granix 5/10  R lung pulm nodule   -outpatient f/u  HTN   -norvasc, cozaar, metoprolol  HLD   -lipitor  GERD  Hyponatremia  Hypercalcemia (diagnosis on admit)   -increase sensipar to 90mg   -follow up PMD  Debility   -PT    Am labs    Quality-Core Measures   Reviewed items::  Medications reviewed  Sue catheter::  No Sue  Central line in place:  Need for access  DVT: xarelto on hold for procedure.

## 2019-05-28 ENCOUNTER — APPOINTMENT (OUTPATIENT)
Dept: RADIOLOGY | Facility: MEDICAL CENTER | Age: 69
End: 2019-05-28
Attending: INTERNAL MEDICINE
Payer: COMMERCIAL

## 2019-05-28 ENCOUNTER — APPOINTMENT (OUTPATIENT)
Dept: RADIOLOGY | Facility: MEDICAL CENTER | Age: 69
End: 2019-05-28
Attending: HOSPITALIST
Payer: COMMERCIAL

## 2019-05-28 LAB
ALBUMIN SERPL BCP-MCNC: 3.2 G/DL (ref 3.2–4.9)
ALBUMIN/GLOB SERPL: 0.8 G/DL
ALP SERPL-CCNC: 226 U/L (ref 30–99)
ALT SERPL-CCNC: 33 U/L (ref 2–50)
ANION GAP SERPL CALC-SCNC: 10 MMOL/L (ref 0–11.9)
AST SERPL-CCNC: 54 U/L (ref 12–45)
BILIRUB SERPL-MCNC: 0.5 MG/DL (ref 0.1–1.5)
BUN SERPL-MCNC: 23 MG/DL (ref 8–22)
CALCIUM SERPL-MCNC: 10.4 MG/DL (ref 8.4–10.2)
CHLORIDE SERPL-SCNC: 101 MMOL/L (ref 96–112)
CO2 SERPL-SCNC: 23 MMOL/L (ref 20–33)
CREAT SERPL-MCNC: 0.55 MG/DL (ref 0.5–1.4)
ERYTHROCYTE [DISTWIDTH] IN BLOOD BY AUTOMATED COUNT: 61.3 FL (ref 35.9–50)
GLOBULIN SER CALC-MCNC: 4 G/DL (ref 1.9–3.5)
GLUCOSE SERPL-MCNC: 84 MG/DL (ref 65–99)
HCT VFR BLD AUTO: 40.6 % (ref 37–47)
HGB BLD-MCNC: 13.1 G/DL (ref 12–16)
INR PPP: 0.97 (ref 0.87–1.13)
MCH RBC QN AUTO: 27.1 PG (ref 27–33)
MCHC RBC AUTO-ENTMCNC: 32.3 G/DL (ref 33.6–35)
MCV RBC AUTO: 83.9 FL (ref 81.4–97.8)
PLATELET # BLD AUTO: 234 K/UL (ref 164–446)
PMV BLD AUTO: 10 FL (ref 9–12.9)
POTASSIUM SERPL-SCNC: 4 MMOL/L (ref 3.6–5.5)
PROT SERPL-MCNC: 7.2 G/DL (ref 6–8.2)
PROTHROMBIN TIME: 12.8 SEC (ref 12–14.6)
RBC # BLD AUTO: 4.84 M/UL (ref 4.2–5.4)
SODIUM SERPL-SCNC: 134 MMOL/L (ref 135–145)
WBC # BLD AUTO: 4.5 K/UL (ref 4.8–10.8)

## 2019-05-28 PROCEDURE — 85027 COMPLETE CBC AUTOMATED: CPT

## 2019-05-28 PROCEDURE — 700117 HCHG RX CONTRAST REV CODE 255: Performed by: HOSPITALIST

## 2019-05-28 PROCEDURE — 72193 CT PELVIS W/DYE: CPT

## 2019-05-28 PROCEDURE — 85610 PROTHROMBIN TIME: CPT

## 2019-05-28 PROCEDURE — 80053 COMPREHEN METABOLIC PANEL: CPT

## 2019-05-28 RX ADMIN — IOHEXOL 100 ML: 350 INJECTION, SOLUTION INTRAVENOUS at 13:12

## 2019-05-28 RX ADMIN — IOHEXOL 25 ML: 240 INJECTION, SOLUTION INTRATHECAL; INTRAVASCULAR; INTRAVENOUS; ORAL at 13:13

## 2019-05-28 ASSESSMENT — ENCOUNTER SYMPTOMS
VOMITING: 0
DIAPHORESIS: 0
DIZZINESS: 0
FEVER: 0
DIARRHEA: 0
CHILLS: 0
CONSTIPATION: 0
HEADACHES: 0
HEARTBURN: 0
WHEEZING: 0
WEAKNESS: 1
SHORTNESS OF BREATH: 0
MYALGIAS: 1
NAUSEA: 0
COUGH: 0
ABDOMINAL PAIN: 0

## 2019-05-28 NOTE — [facility]
Infectious Disease Progress Note    Author: Ashely Hinojosa M.D. Date & Time of service: 5/28/2019  10:16 AM    Chief Complaint:  Brain abscess, iliopsoas abscess, leukopenia     Interval History:   68 y.o. female admitted 4/30/2019. 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  Work-up revealed a right iliopsoas lesion, gluteal abscess, and mult brain abscesses  5/6 Interval 24 hours of Vitals/Labs/Micro,and imaging results reviewed as available. See assessment.   5/8 Interval 24 hours of Vitals/Labs/Micro,and imaging results reviewed as available. See assessment.   5/10 AF WBC 3 continued c/o constipation denies HA, visual changes, neuro deficits  5/11 AF WBC 8.8 isolation stopped due to resolution neutropenia-sleepy today  5/12 AF no CBC somnolent-no acute events noted  5/13 AF WBC 6.2 c/o pain left hip today, unchanged Worse with movement and improved with ice. Refused PT yesterday   5/14 AF c/o dizzyness whenever she tirns on her side-no new HA or visual changes-still havng hip pain  5/15 AF sleeping at time of rounds-by report ambulating  5/16 AF weightbearing continues to improve-ambulating more.  No new complaints  5/20- still has some pain in the hip but ambulating without any issues. No fevers. In good spirits.  5/22/2019 continues to have hip pain.  No fevers have been noted  5/24 AF no CBC plan for CHAS today. Denies any headaches. States she is ambulating  5/25 afebrile CBC not done today.  Patient CHAS was negative.  She has a poor appetite and is requesting an appetite stimulant.  Ongoing left hip pain.  Plan for CT-guided drainage tomorrow  5/28 afebrile WBC 4.5.  Patient is resting comfortably.  She states that she has right breast pain.  Ultrasound of the breast otherwise unremarkable.  She is awaiting CT-guided  drainage which has been delayed as CT scan malfunctioning yesterday.    Review of Systems:  Review of Systems   Constitutional: Negative for chills and fever.   Respiratory: Negative for cough and wheezing.    Cardiovascular: Positive for chest pain.        Right breast   Gastrointestinal: Negative for abdominal pain, diarrhea, nausea and vomiting.   Musculoskeletal: Positive for joint pain.        Left hip   Neurological: Negative for dizziness and headaches.   All other systems reviewed and are negative.      Hemodynamics:   T97.7 /75 HR85 RR18 O2 sat 91% RA    Physical Exam:  Physical Exam   Constitutional: She is oriented to person, place, and time. No distress.   HENT:   Mouth/Throat: Oropharynx is clear and moist. No oropharyngeal exudate.   Eyes: Pupils are equal, round, and reactive to light. EOM are normal. Right eye exhibits no discharge. Left eye exhibits no discharge.   Neck: Neck supple. No JVD present.   Cardiovascular: Normal rate and regular rhythm.    Pulmonary/Chest: Effort normal. No stridor. No respiratory distress. She has no wheezes.   Abdominal: Soft. Bowel sounds are normal. There is no tenderness.   Musculoskeletal: She exhibits no edema.   RUE PICC line - no erythema     Neurological: She is alert and oriented to person, place, and time. She displays normal reflexes.   Skin: Skin is warm. No rash noted. She is not diaphoretic.   Psychiatric: She has a normal mood and affect. Her behavior is normal.   Vitals reviewed.    Labs:  Recent Labs      05/26/19 0233 05/28/19 0334   WBC  5.4  4.5*   RBC  4.64  4.84   HEMOGLOBIN  12.7  13.1   HEMATOCRIT  39.6  40.6   MCV  85.3  83.9   MCH  27.4  27.1   RDW  62.9*  61.3*   PLATELETCT  265  234   MPV  10.4  10.0     Recent Labs      05/26/19 0233 05/28/19   0334   SODIUM  131*  134*   POTASSIUM  5.2  4.0   CHLORIDE  104  101   CO2  19*  23   GLUCOSE  85  84   BUN  29*  23*     Recent Labs      05/26/19 0233 05/28/19   0334   ALBUMIN   "3.1*  3.2   TBILIRUBIN  0.4  0.5   ALKPHOSPHAT  206*  226*   TOTPROTEIN  7.4  7.2   ALTSGPT  24  33   ASTSGOT  46*  54*   CREATININE  0.52  0.55       Imaging:  No new imaging to review    Micro:  Results     ** No results found for the last 168 hours. **           ASSESSMENT/PLAN:   Gluteal and iliopsoas abscess s/p treatment.  Additional work-up ongoing  Afebrile  No leukocytosis  Patient clinically stable  Adjacent to hardware in right hip (placed 12/17/18)  CT 4/23 \"Fluid collections within the right gluteal and iliacis muscles.. The collection within the right gluteal muscle has unchanged while the fluid collection within the right iliacis muscle is increased somewhat in size.\" 2.7 cm  Cultures 3/29 and 4/4 neg  Ambulating without any issues  Repeat MRI on 5/20/2019 - interval decrease in collection in R gluteal muscle and persistent multiloculated collection in R iliacus muscule  Fluid collections not amenable to IR drainage given size  Awaiting repeat CT-guided aspiration/drainage    Brain abscesses, improved status post treatment  Afebrile  Original abscesses improved on therapy.   MRI 4/23 \"supra and infratentorial brain parenchyma... interval decrease in the size of the lesions. There are also interval reduction in the extent of the surrounding white matter edema in the restricted diffusion consistent with improvement/treatment response of the most of the multifocal brain abscesses.  New enhancing lesions in the left basal ganglia and right cerebellum  Mult blood cultures neg  CHAS neg 3/15  Repeat MRI shows persistent of the abscesses and increase in the number  No benefit of PET scan per notes  Abx (vancomycin, cefepime and Flagyl) discontinued on 5/23  Repeat CHAS on 5/24-negative    Type 2 DM  Hemoglobin A1c 6.3 in December 2018  Blood sugars well controlled inpatient  BS 84 today                  "

## 2019-05-28 NOTE — [facility]
Hospital Medicine Progress Note, Adult, Complex               Author: Odalys Mitchell Date & Time created: 5/28/2019  9:22 AM     CC: brain abscesses    Interval History:  This is a 68 y.o. female here with history of , history of COPD, diabetes, hypertension, depression, anemia right hip pain, fever and abnormal brain mri.  CHAS shows no vegetations on 5/24 per Dr. Potter  MRI of brain shows new lesions in brain  No pet scan benefit per oncology  Ultrasound of breast with no implant rupture, outpatient imaging recommended    Review of Systems:  Review of Systems   Constitutional: Negative for diaphoresis and fever.   Respiratory: Negative for cough and shortness of breath.    Cardiovascular: Negative for chest pain and leg swelling.   Gastrointestinal: Negative for abdominal pain, constipation, heartburn and nausea.   Genitourinary: Negative for dysuria and urgency.   Musculoskeletal: Positive for myalgias.        Pain in low back and pelvis mostly with sitting   Skin: Negative for itching and rash.   Neurological: Positive for weakness. Negative for headaches.       Physical Exam:  Physical Exam   Constitutional: She is oriented to person, place, and time.   HENT:   Mouth/Throat: Oropharynx is clear and moist. No oropharyngeal exudate.   Eyes: Conjunctivae are normal. No scleral icterus.   Neck: No tracheal deviation present.   Cardiovascular: Normal rate and regular rhythm.  PMI is displaced.    No murmur heard.  Pulmonary/Chest: Effort normal. She has no wheezes.   Abdominal: Soft. Bowel sounds are normal. She exhibits no distension. There is no tenderness.   Musculoskeletal: She exhibits no edema.   Neurological: She is alert and oriented to person, place, and time.   Skin: Skin is warm and dry. No rash noted. She is not diaphoretic. No erythema.   Psychiatric: Her behavior is normal.   Vitals reviewed.      Labs:          Recent Labs      05/26/19   0233  05/28/19   0334   SODIUM  131*  134*   POTASSIUM  5.2   4.0   CHLORIDE  104  101   CO2  19*  23   BUN  29*  23*   CREATININE  0.52  0.55   CALCIUM  10.8*  10.4*     Recent Labs      05/26/19   0233  05/28/19   0334   ALTSGPT  24  33   ASTSGOT  46*  54*   ALKPHOSPHAT  206*  226*   TBILIRUBIN  0.4  0.5   GLUCOSE  85  84     Recent Labs      05/26/19   0233  05/28/19   0334   RBC  4.64  4.84   HEMOGLOBIN  12.7  13.1   HEMATOCRIT  39.6  40.6   PLATELETCT  265  234   PROTHROMBTM   --   12.8   INR   --   0.97     Recent Labs      05/26/19   0233  05/28/19   0334   WBC  5.4  4.5*   ASTSGOT  46*  54*   ALTSGPT  24  33   ALKPHOSPHAT  206*  226*   TBILIRUBIN  0.4  0.5           Hemodynamics:   T97.7 /75 HR85 RR18 O2 sat 91%     GI/Nutrition:  Orders Placed This Encounter   Procedures   • Diet NPO     Standing Status:   Standing     Number of Occurrences:   8     Order Specific Question:   Restrict to:     Answer:   Strict [1]     Medical Decision Making, by Problem:  Brain abscesses on MRI with improvement on antibiotics   MRI with new small lesions, CHAS negative for vegetations, no pet scan benefit per oncology     Right gluteal and ileacus abscesses on imaging, CT drainage pending of ileacus abscess for culture   Patient is off antibiotics per ID    H/o pelvic fractures, pins in sacrum, may need removal by Dr. Porter in the future, previous note from Dr. Porter recommends 6 months from December, will need follow up   Mobility improving, continue therapy, patient still resistant to sitting due to pain    breast cancer bilateral mastectomy status post chemotherapy, treated over 20 years ago per patient, outpatient follow up for repeat imaging     Slurred speech due to brain lesions,    speech is improved  Dysphagia, oral diet   History of dvt, xarelto on hold for procedure to drain abscess  Thrombocytopenia, improved off merrem    Restless leg syndrome, mirapex  Dyslipidemia, pravachol  Essential hypertension, norvasc  Debility, patient resides at a SNF, will need  placement on discharge    DNR      Quality-Core Measures   Reviewed items::  Labs reviewed and Medications reviewed  Sue catheter::  No Sue  DVT prophylaxis pharmacological::  Enoxaparin (Lovenox)  Ulcer Prophylaxis::  Yes  Antibiotics:  Treating active infection/contamination beyond 24 hours perioperative coverage  Assessed for rehabilitation services:  Patient was assess for and/or received rehabilitation services during this hospitalization

## 2019-05-29 ENCOUNTER — HOSPITAL ENCOUNTER (OUTPATIENT)
Facility: MEDICAL CENTER | Age: 69
DRG: 356 | End: 2019-05-29
Payer: MEDICARE

## 2019-05-29 ENCOUNTER — HOSPITAL ENCOUNTER (INPATIENT)
Facility: MEDICAL CENTER | Age: 69
LOS: 73 days | DRG: 356 | End: 2019-08-10
Admitting: FAMILY MEDICINE
Payer: MEDICARE

## 2019-05-29 DIAGNOSIS — G47.00 INSOMNIA, UNSPECIFIED TYPE: ICD-10-CM

## 2019-05-29 DIAGNOSIS — G89.29 OTHER CHRONIC PAIN: ICD-10-CM

## 2019-05-29 PROCEDURE — 770006 HCHG ROOM/CARE - MED/SURG/GYN SEMI*

## 2019-05-29 PROCEDURE — A9270 NON-COVERED ITEM OR SERVICE: HCPCS | Performed by: INTERNAL MEDICINE

## 2019-05-29 PROCEDURE — 87556 M.TUBERCULO DNA AMP PROBE: CPT

## 2019-05-29 PROCEDURE — 369999 HCHG MISC LAB CHARGE

## 2019-05-29 PROCEDURE — 700102 HCHG RX REV CODE 250 W/ 637 OVERRIDE(OP): Performed by: HOSPITALIST

## 2019-05-29 PROCEDURE — A9270 NON-COVERED ITEM OR SERVICE: HCPCS | Performed by: HOSPITALIST

## 2019-05-29 PROCEDURE — 700102 HCHG RX REV CODE 250 W/ 637 OVERRIDE(OP): Performed by: INTERNAL MEDICINE

## 2019-05-29 RX ORDER — CINACALCET 90 MG/1
90 TABLET, FILM COATED ORAL DAILY
Status: ON HOLD | COMMUNITY
End: 2019-08-10

## 2019-05-29 RX ORDER — AMOXICILLIN 250 MG
2 CAPSULE ORAL 2 TIMES DAILY
Status: DISCONTINUED | OUTPATIENT
Start: 2019-05-29 | End: 2019-06-28

## 2019-05-29 RX ORDER — CINACALCET 30 MG/1
60 TABLET, FILM COATED ORAL DAILY
Status: DISCONTINUED | OUTPATIENT
Start: 2019-05-29 | End: 2019-08-10 | Stop reason: HOSPADM

## 2019-05-29 RX ORDER — BISACODYL 10 MG
10 SUPPOSITORY, RECTAL RECTAL
Status: DISCONTINUED | OUTPATIENT
Start: 2019-05-29 | End: 2019-06-28

## 2019-05-29 RX ORDER — OMEPRAZOLE 20 MG/1
20 CAPSULE, DELAYED RELEASE ORAL DAILY
Status: DISCONTINUED | OUTPATIENT
Start: 2019-05-29 | End: 2019-08-10 | Stop reason: HOSPADM

## 2019-05-29 RX ORDER — AMLODIPINE BESYLATE 5 MG/1
10 TABLET ORAL
Status: DISCONTINUED | OUTPATIENT
Start: 2019-05-29 | End: 2019-08-10 | Stop reason: HOSPADM

## 2019-05-29 RX ORDER — PRAMIPEXOLE DIHYDROCHLORIDE 0.5 MG/1
1 TABLET ORAL
COMMUNITY

## 2019-05-29 RX ORDER — ATORVASTATIN CALCIUM 10 MG/1
10 TABLET, FILM COATED ORAL EVERY EVENING
Status: DISCONTINUED | OUTPATIENT
Start: 2019-05-29 | End: 2019-08-10 | Stop reason: HOSPADM

## 2019-05-29 RX ORDER — DULOXETIN HYDROCHLORIDE 30 MG/1
30 CAPSULE, DELAYED RELEASE ORAL DAILY
Status: ON HOLD | COMMUNITY
End: 2019-08-10

## 2019-05-29 RX ORDER — LOSARTAN POTASSIUM 50 MG/1
50 TABLET ORAL
Status: DISCONTINUED | OUTPATIENT
Start: 2019-05-29 | End: 2019-07-16

## 2019-05-29 RX ORDER — TRAMADOL HYDROCHLORIDE 100 MG/1
100 TABLET, EXTENDED RELEASE ORAL 2 TIMES DAILY
Status: ON HOLD | COMMUNITY
End: 2019-08-10

## 2019-05-29 RX ORDER — LOSARTAN POTASSIUM 50 MG/1
50 TABLET ORAL DAILY
Status: ON HOLD | COMMUNITY
End: 2019-08-10

## 2019-05-29 RX ORDER — MIRTAZAPINE 15 MG/1
15 TABLET, FILM COATED ORAL
Status: ON HOLD | COMMUNITY
End: 2019-08-10

## 2019-05-29 RX ORDER — ONDANSETRON 4 MG/1
4 TABLET, ORALLY DISINTEGRATING ORAL EVERY 4 HOURS PRN
Status: DISCONTINUED | OUTPATIENT
Start: 2019-05-29 | End: 2019-08-10 | Stop reason: HOSPADM

## 2019-05-29 RX ORDER — ONDANSETRON 2 MG/ML
4 INJECTION INTRAMUSCULAR; INTRAVENOUS EVERY 4 HOURS PRN
Status: DISCONTINUED | OUTPATIENT
Start: 2019-05-29 | End: 2019-08-10 | Stop reason: HOSPADM

## 2019-05-29 RX ORDER — POLYETHYLENE GLYCOL 3350 17 G/17G
1 POWDER, FOR SOLUTION ORAL
Status: DISCONTINUED | OUTPATIENT
Start: 2019-05-29 | End: 2019-06-28

## 2019-05-29 RX ORDER — ACETAMINOPHEN 325 MG/1
650 TABLET ORAL EVERY 6 HOURS PRN
Status: DISCONTINUED | OUTPATIENT
Start: 2019-05-29 | End: 2019-06-30

## 2019-05-29 RX ORDER — PANTOPRAZOLE SODIUM 40 MG/1
40 TABLET, DELAYED RELEASE ORAL DAILY
Status: ON HOLD | COMMUNITY
End: 2019-08-10

## 2019-05-29 RX ORDER — GABAPENTIN 300 MG/1
300 CAPSULE ORAL 2 TIMES DAILY
Status: ON HOLD | COMMUNITY
End: 2019-08-10

## 2019-05-29 RX ORDER — AMLODIPINE BESYLATE 5 MG/1
5 TABLET ORAL DAILY
Status: ON HOLD | COMMUNITY
End: 2019-08-10

## 2019-05-29 RX ORDER — FERROUS SULFATE 325(65) MG
325 TABLET ORAL DAILY
COMMUNITY

## 2019-05-29 RX ORDER — ATORVASTATIN CALCIUM 10 MG/1
10 TABLET, FILM COATED ORAL NIGHTLY
COMMUNITY

## 2019-05-29 RX ORDER — ACETAMINOPHEN 325 MG/1
650 TABLET ORAL EVERY 6 HOURS PRN
Status: DISCONTINUED | OUTPATIENT
Start: 2019-05-29 | End: 2019-05-29

## 2019-05-29 RX ADMIN — METOPROLOL TARTRATE 25 MG: 25 TABLET, FILM COATED ORAL at 18:40

## 2019-05-29 RX ADMIN — LOSARTAN POTASSIUM 50 MG: 50 TABLET ORAL at 18:40

## 2019-05-29 RX ADMIN — ACETAMINOPHEN 650 MG: 325 TABLET, FILM COATED ORAL at 20:57

## 2019-05-29 RX ADMIN — ATORVASTATIN CALCIUM 10 MG: 10 TABLET, FILM COATED ORAL at 18:40

## 2019-05-29 RX ADMIN — OMEPRAZOLE 20 MG: 20 CAPSULE, DELAYED RELEASE ORAL at 18:41

## 2019-05-29 RX ADMIN — CINACALCET HYDROCHLORIDE 60 MG: 30 TABLET, COATED ORAL at 18:41

## 2019-05-29 RX ADMIN — METFORMIN HYDROCHLORIDE 500 MG: 500 TABLET, FILM COATED ORAL at 18:40

## 2019-05-29 RX ADMIN — AMLODIPINE BESYLATE 10 MG: 5 TABLET ORAL at 18:40

## 2019-05-29 ASSESSMENT — ENCOUNTER SYMPTOMS
HEADACHES: 0
WEAKNESS: 1
FEVER: 0
CONSTIPATION: 0
WHEEZING: 0
VOMITING: 0
DIZZINESS: 0
ABDOMINAL PAIN: 0
CHILLS: 0
COUGH: 0
VOMITING: 0
HEMOPTYSIS: 0
DIZZINESS: 0
DIARRHEA: 0
HEADACHES: 0
SHORTNESS OF BREATH: 0
MYALGIAS: 1
ORTHOPNEA: 0
MYALGIAS: 0
BRUISES/BLEEDS EASILY: 0
FEVER: 0
CONSTIPATION: 0
PALPITATIONS: 0
BRUISES/BLEEDS EASILY: 0
SHORTNESS OF BREATH: 0
NAUSEA: 0
COUGH: 0
DIAPHORESIS: 0
SORE THROAT: 0
HEARTBURN: 0
NAUSEA: 0
PALPITATIONS: 0
CHILLS: 0
DIARRHEA: 0

## 2019-05-29 ASSESSMENT — COGNITIVE AND FUNCTIONAL STATUS - GENERAL
DAILY ACTIVITIY SCORE: 20
MOVING FROM LYING ON BACK TO SITTING ON SIDE OF FLAT BED: A LITTLE
SUGGESTED CMS G CODE MODIFIER MOBILITY: CK
DRESSING REGULAR UPPER BODY CLOTHING: A LITTLE
CLIMB 3 TO 5 STEPS WITH RAILING: A LOT
TOILETING: A LITTLE
TURNING FROM BACK TO SIDE WHILE IN FLAT BAD: A LITTLE
MOBILITY SCORE: 15
DRESSING REGULAR LOWER BODY CLOTHING: A LITTLE
STANDING UP FROM CHAIR USING ARMS: A LOT
MOVING TO AND FROM BED TO CHAIR: A LITTLE
HELP NEEDED FOR BATHING: A LITTLE
SUGGESTED CMS G CODE MODIFIER DAILY ACTIVITY: CJ
WALKING IN HOSPITAL ROOM: A LOT

## 2019-05-29 NOTE — PROGRESS NOTES
Transfer from [facility], history of multiple abscesses, persistent right iliac and gluteal abscess, will need CT-guided aspiration.

## 2019-05-29 NOTE — [facility]
Infectious Disease Progress Note    Author: Ashely Hinojosa M.D. Date & Time of service: 5/29/2019  11:08 AM    Chief Complaint:  Brain abscess, iliopsoas abscess, leukopenia     Interval History:   68 y.o. female admitted 4/30/2019. 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  Work-up revealed a right iliopsoas lesion, gluteal abscess, and mult brain abscesses  5/6 Interval 24 hours of Vitals/Labs/Micro,and imaging results reviewed as available. See assessment.   5/8 Interval 24 hours of Vitals/Labs/Micro,and imaging results reviewed as available. See assessment.   5/10 AF WBC 3 continued c/o constipation denies HA, visual changes, neuro deficits  5/11 AF WBC 8.8 isolation stopped due to resolution neutropenia-sleepy today  5/12 AF no CBC somnolent-no acute events noted  5/13 AF WBC 6.2 c/o pain left hip today, unchanged Worse with movement and improved with ice. Refused PT yesterday   5/14 AF c/o dizzyness whenever she tirns on her side-no new HA or visual changes-still havng hip pain  5/15 AF sleeping at time of rounds-by report ambulating  5/16 AF weightbearing continues to improve-ambulating more.  No new complaints  5/20- still has some pain in the hip but ambulating without any issues. No fevers. In good spirits.  5/22/2019 continues to have hip pain.  No fevers have been noted  5/24 AF no CBC plan for CHAS today. Denies any headaches. States she is ambulating  5/25 afebrile CBC not done today.  Patient CHAS was negative.  She has a poor appetite and is requesting an appetite stimulant.  Ongoing left hip pain.  Plan for CT-guided drainage tomorrow  5/28 afebrile WBC 4.5.  Patient is resting comfortably.  She states that she has right breast pain.  Ultrasound of the breast otherwise unremarkable.  She is awaiting CT-guided  drainage which has been delayed as CT scan malfunctioning yesterday.  5/29 afebrile, no CBC.  Patient denies any right hip pain.  Continues to have left-sided hip pain especially with sitting. She had a repeat CT of the pelvis yesterday that revealed no changes in the abscess.  Per report, abscesses are too small for IR drainage given location and recommendation to transfer the patient to [facility] for CT-guided aspiration of the fluid for culture.  She continues to deny any fevers or chills.    Review of Systems:  Review of Systems   Constitutional: Negative for chills and fever.   Respiratory: Negative for cough and wheezing.    Cardiovascular: Positive for chest pain.        Right breast-stable   Gastrointestinal: Negative for abdominal pain, diarrhea, nausea and vomiting.   Musculoskeletal: Positive for joint pain.        Left hip-worse with sitting   Neurological: Negative for dizziness and headaches.   All other systems reviewed and are negative.      Hemodynamics:   T97 /69 HR82 RR18 O2 sat 90% RA    Physical Exam:  Physical Exam   Constitutional: She is oriented to person, place, and time. No distress.   HENT:   Mouth/Throat: Oropharynx is clear and moist. No oropharyngeal exudate.   Eyes: Pupils are equal, round, and reactive to light. EOM are normal. Right eye exhibits no discharge. Left eye exhibits no discharge.   Neck: Neck supple. No JVD present.   Cardiovascular: Normal rate and regular rhythm.    Pulmonary/Chest: Effort normal. No stridor. No respiratory distress. She has no wheezes.   Abdominal: Soft. Bowel sounds are normal. There is no tenderness.   Musculoskeletal: She exhibits no edema.   RUE PICC line - no erythema     Neurological: She is alert and oriented to person, place, and time. She displays normal reflexes.   Skin: Skin is warm. No rash noted. She is not diaphoretic.   Psychiatric: She has a normal mood and affect. Her behavior is normal.   Vitals reviewed.    Labs:  Recent  "Labs      05/28/19   0334   WBC  4.5*   RBC  4.84   HEMOGLOBIN  13.1   HEMATOCRIT  40.6   MCV  83.9   MCH  27.1   RDW  61.3*   PLATELETCT  234   MPV  10.0     Recent Labs      05/28/19   0334   SODIUM  134*   POTASSIUM  4.0   CHLORIDE  101   CO2  23   GLUCOSE  84   BUN  23*     Recent Labs      05/28/19   0334   ALBUMIN  3.2   TBILIRUBIN  0.5   ALKPHOSPHAT  226*   TOTPROTEIN  7.2   ALTSGPT  33   ASTSGOT  54*   CREATININE  0.55       Imaging:  No new imaging to review    Micro:  Results     ** No results found for the last 168 hours. **           ASSESSMENT/PLAN:   Gluteal and iliopsoas abscess s/p treatment.  Additional work-up ongoing  Afebrile  No leukocytosis  Patient clinically stable  Adjacent to hardware in right hip (placed 12/17/18)  CT 4/23 \"Fluid collections within the right gluteal and iliacis muscles.. The collection within the right gluteal muscle has unchanged while the fluid collection within the right iliacis muscle is increased somewhat in size.\" 2.7 cm  Cultures 3/29 and 4/4 neg  Ambulating without any issues  Repeat MRI on 5/20/2019 - interval decrease in collection in R gluteal muscle and persistent multiloculated collection in R iliacus muscule  Fluid collections not amenable to IR drainage given size  Repeat CT of the pelvis on 5/28-no changes in abscess size  Plan for transfer to [facility] to undergo CT-guided aspiration for culture  Patient is otherwise hemogram medically stable without any fevers or leukocytosis and would recommend continuing to hold off IV antibiotics until aspiration is obtained    Brain abscesses, improved status post treatment  Afebrile  Original abscesses improved on therapy.   MRI 4/23 \"supra and infratentorial brain parenchyma... interval decrease in the size of the lesions. There are also interval reduction in the extent of the surrounding white matter edema in the restricted diffusion consistent with improvement/treatment response of the most of the multifocal " brain abscesses.  New enhancing lesions in the left basal ganglia and right cerebellum  Mult blood cultures neg  CHAS neg 3/15  Repeat MRI shows persistent of the abscesses and increase in the number  No benefit of PET scan per notes  Abx (vancomycin, cefepime and Flagyl) discontinued on 5/23  Repeat CHAS on 5/24-negative  Denies any headache    History of pelvic fracture  Status post pin placement and sacral  Eventually will need removal    Type 2 DM  Hemoglobin A1c 6.3 in December 2018  Blood sugars well controlled inpatient      I have performed a physical exam and reviewed and updated ROS and plan today 5/29/2019.  In review of yesterday's note 5/28/2019, there are no changes except as documented above.    Plan for transfer to [facility] for CT-guided aspiration.  Will follow patient    Discussed with IM, Dr. Mitchell

## 2019-05-29 NOTE — [facility]
Hospital Medicine Progress Note, Adult, Complex               Author: Odalysmely Mitchell Date & Time created: 5/29/2019  9:06 AM     CC: brain abscesses    Interval History:  This is a 68 y.o. female here with history of , history of COPD, diabetes, hypertension, depression, anemia right hip pain, fever and abnormal brain mri.  CHAS shows no vegetations on 5/24 per Dr. Potter  Discussed fluid collection in pelvis with radiology Dr. Finley on 5/28 who recommends transfer to [facility] for CT guided aspiration of the fluid for culture    Review of Systems:  Review of Systems   Constitutional: Negative for chills and fever.   Respiratory: Negative for shortness of breath and wheezing.    Cardiovascular: Negative for chest pain, palpitations and leg swelling.   Gastrointestinal: Negative for abdominal pain, constipation, heartburn, nausea and vomiting.   Genitourinary: Negative for urgency.   Musculoskeletal: Positive for myalgias.        Pain in low back and pelvis mostly with sitting  Pain in right chest after pulling herself up in bed two days ago   Skin: Negative for itching and rash.   Neurological: Positive for weakness. Negative for headaches.   Endo/Heme/Allergies: Does not bruise/bleed easily.       Physical Exam:  Physical Exam   Constitutional: She is oriented to person, place, and time.   HENT:   Mouth/Throat: No oropharyngeal exudate.   Eyes: Conjunctivae are normal. No scleral icterus.   Neck: No tracheal deviation present.   Cardiovascular: Normal rate and regular rhythm.  PMI is displaced.    No murmur heard.  Pulmonary/Chest: Effort normal. She has no wheezes.   Abdominal: Soft. She exhibits no distension. There is no tenderness.   Musculoskeletal: She exhibits no edema.   Neurological: She is alert and oriented to person, place, and time.   Skin: Skin is warm and dry. No rash noted. She is not diaphoretic.   Psychiatric: Her behavior is normal.   Vitals reviewed.      Labs:          Recent Labs      05/28/19    0334   SODIUM  134*   POTASSIUM  4.0   CHLORIDE  101   CO2  23   BUN  23*   CREATININE  0.55   CALCIUM  10.4*     Recent Labs      05/28/19   0334   ALTSGPT  33   ASTSGOT  54*   ALKPHOSPHAT  226*   TBILIRUBIN  0.5   GLUCOSE  84     Recent Labs      05/28/19   0334   RBC  4.84   HEMOGLOBIN  13.1   HEMATOCRIT  40.6   PLATELETCT  234   PROTHROMBTM  12.8   INR  0.97     Recent Labs      05/28/19   0334   WBC  4.5*   ASTSGOT  54*   ALTSGPT  33   ALKPHOSPHAT  226*   TBILIRUBIN  0.5           Hemodynamics:   T97 /69 HR82 RR18 O2 sat 90%     GI/Nutrition:  Orders Placed This Encounter   Procedures   • Diet Order Regular     Standing Status:   Standing     Number of Occurrences:   1     Order Specific Question:   Diet:     Answer:   Regular [1]     Order Specific Question:   Texture/Fiber modifications:     Answer:   Dysphagia 3(Mechanical Soft)specify fluid consistency(question 6) [3]     Medical Decision Making, by Problem:  Brain abscesses on MRI with improvement on antibiotics   MRI with Increase in number of innumerable small ovoid enhancing lesions throughout the brain parenchyma , CHAS negative for vegetations, no pet scan benefit at this time per oncology     Right gluteal and ileacus abscesses on imaging, CT drainage pending of ileacus abscess for culture   Discussed with radiology and will transfer to [facility] for fluoroscopy guidance of drainage   Patient is off antibiotics per ID    H/o pelvic fractures, pins in sacrum, may need removal by Dr. Porter in the future, previous note from Dr. Porter recommends 6 months from December, will need follow up   Mobility improving, continue therapy    breast cancer bilateral mastectomy status post chemotherapy, treated over 20 years ago per patient, outpatient follow up for repeat imaging     Slurred speech due to brain lesions,    speech is improved  Dysphagia, oral diet   History of dvt, xarelto on hold for procedure to drain abscess  Thrombocytopenia, improved off  merrem    Restless leg syndrome, mirapex  Dyslipidemia, pravachol  Essential hypertension, norvasc  Debility, patient resides at a SNF, will need placement on discharge    DNR      Quality-Core Measures   Reviewed items::  Labs reviewed and Medications reviewed  Sue catheter::  No Sue  DVT prophylaxis pharmacological::  Enoxaparin (Lovenox)  Ulcer Prophylaxis::  Yes  Antibiotics:  Treating active infection/contamination beyond 24 hours perioperative coverage  Assessed for rehabilitation services:  Patient was assess for and/or received rehabilitation services during this hospitalization

## 2019-05-29 NOTE — H&P
Hospital Medicine History and Physical    Date of Service  5/29/2019    Chief Complaint  Pelvic abscess    History of Presenting Illness  68 y.o. female who presented with pelvic and low back pain and confusion. She also had new slurred speech. Imaging of the abdomen, pelvis and brain revealed abscesses and she was treated with iv antibiotics for a full course of therapy per Infectious Disease, Dr. Hinojosa. Her slurred speech improved and she was ambulating 200feet with physical therapy however repeat imaging showed persistent pelvic abscesses and new brain enhancement. Cultures remained negative for any growth. Transesophageal echocardiogram done by Dr. Potter showed no vegetations. The patient does have remote history of breast cancer and oncology was called but stated no added benefit to pet CT scan at this time.  The size of the pelvic abscesses was reviewed by interventional radiology and the case was discussed with Dr. Finley who recommended transfer to [facility] for fluoroscopy guided CT scan for abscess aspiration and culture.     Primary Care Physician  No primary care provider on file.    Code Status  DNR    Review of Systems  Review of Systems   Constitutional: Negative for chills, diaphoresis and fever.   HENT: Negative for congestion and sore throat.    Respiratory: Negative for cough, hemoptysis and shortness of breath.    Cardiovascular: Positive for chest pain. Negative for palpitations, orthopnea and leg swelling.        Right chest wall pain after mobilizing with occupational therapy   Gastrointestinal: Negative for constipation, diarrhea, nausea and vomiting.   Genitourinary: Negative for dysuria, frequency and hematuria.   Musculoskeletal: Negative for myalgias.        Low back and pelvic pain with sitting   Skin: Negative for itching and rash.   Neurological: Negative for dizziness and headaches.   Endo/Heme/Allergies: Does not bruise/bleed easily.          Past Medical History  Past Medical  History:   Diagnosis Date   • Cancer (HCC)    • COPD (chronic obstructive pulmonary disease) (HCC)    • Diabetes (HCC)     Record received from [facility] 12/14/18   • GERD (gastroesophageal reflux disease)     Record received from [facility] 12/14/18   • Hyperlipidemia     Record received from [facility] 12/14/18   • Hypertension     Record received from [facility] 12/14/18       Surgical History  Past Surgical History:   Procedure Laterality Date   • CHAS N/A 5/24/2019    Procedure: ECHOCARDIOGRAM, TRANSESOPHAGEAL;  Surgeon: Christopher Potter M.D.;  Location: SURGERY [facility];  Service: Cardiac   • SI SCREW Right 12/17/2018    Procedure: SI SCREW;  Surgeon: Cody Porter M.D.;  Location: SURGERY [facility];  Service: Orthopedics   • OTHER      Bilateral Masectomy      Drug Allergies: None    Medications  norvasc 10mg daily  lovenox 40mg sub q daily, restart xarelto 15mg daily after abscess aspiration  sensipar 60mg daily  Metoprolol 25mg bid  prilosec 20mg daily  Gabapentin 300mg bid  Metformin 500mg bid  Losartan 50mg daily  Ferrous sulfate 325mg daily  lipitor 10mg daily    Family History  No family history on file.    Social History  Social History   Substance Use Topics   • Smoking status: Never Smoker   • Smokeless tobacco: Never Used   • Alcohol use Yes      Comment: socially        Allergies  No Known Allergies     Physical Exam  Laboratory   Hemodynamics  T97  /69 HR82 RR18 O2 sat 90% on room air     Physical Exam   Constitutional: She is oriented to person, place, and time. No distress.   HENT:   Mouth/Throat: Oropharynx is clear and moist. No oropharyngeal exudate.   Eyes: Pupils are equal, round, and reactive to light. Conjunctivae and EOM are normal. No scleral icterus.   Patient wears glasses   Neck: Neck supple. No JVD present. No tracheal deviation present.   Cardiovascular: Normal rate and normal heart sounds.    No murmur heard.  Pulmonary/Chest: Effort  normal. No stridor. No respiratory distress. She has no wheezes. She has no rales.   Breast scars consistent with reconstruction   Abdominal: Soft. She exhibits no distension. There is no tenderness.   Musculoskeletal: She exhibits no edema.   Neurological: She is alert and oriented to person, place, and time. Coordination normal.   Skin: Skin is warm and dry. No rash noted. She is not diaphoretic.   Psychiatric: Her behavior is normal.   Vitals reviewed.      Recent Labs      05/28/19   0334   WBC  4.5*   RBC  4.84   HEMOGLOBIN  13.1   HEMATOCRIT  40.6   MCV  83.9   MCH  27.1   MCHC  32.3*   RDW  61.3*   PLATELETCT  234   MPV  10.0     Recent Labs      05/28/19 0334   SODIUM  134*   POTASSIUM  4.0   CHLORIDE  101   CO2  23   GLUCOSE  84   BUN  23*   CREATININE  0.55   CALCIUM  10.4*     Recent Labs      05/28/19 0334   ALTSGPT  33   ASTSGOT  54*   ALKPHOSPHAT  226*   TBILIRUBIN  0.5   GLUCOSE  84     Recent Labs      05/28/19 0334   INR  0.97               Imaging  MRI brain shows new cerebellar enhancement and smaller abscess-like lesions  CT pelvis 1.  Stable abscesses again redemonstrated in the right iliac's muscle and right gluteus medius muscle.    2.  Status post fixation screws coursing through the jose antonio and sacrum for treatment of healing fracture in the right posterior ilium.   Assessment/Plan     I anticipate this patient will require at least two midnights for appropriate medical management, necessitating inpatient admission.    Medical Decision Making, by Problem:  Brain abscesses on MRI with improvement on antibiotics              MRI with Increase in number of innumerable small ovoid enhancing lesions throughout the brain parenchyma , CHAS negative for vegetations, no pet scan benefit at this time per oncology                Right gluteal and ileacus abscesses on imaging, CT drainage pending of ileacus abscess for culture              Discussed with radiology and will transfer to [facility]  fluoroscopy guidance of drainage              Patient is off antibiotics per ID     H/o pelvic fractures, pins in sacrum, may need removal by Dr. Porter in the future, previous note from Dr. Porter recommends 6 months from December, will need follow up              Mobility improving, continue therapy     breast cancer bilateral mastectomy status post chemotherapy, treated over 20 years ago per patient, outpatient follow up for repeat imaging      Slurred speech due to brain lesions,               speech is improved  Dysphagia, oral diet   History of dvt, xarelto on hold for procedure to drain abscess  Thrombocytopenia, improved off merrem     Restless leg syndrome, mirapex  Dyslipidemia, pravachol  Essential hypertension, norvasc  Debility, patient resides at a SNF, will need placement on discharge   she has been accepted to [facility] when ready for transfer  DNR  VTE prophylaxis: lovenox.

## 2019-05-30 ENCOUNTER — APPOINTMENT (OUTPATIENT)
Dept: RADIOLOGY | Facility: MEDICAL CENTER | Age: 69
DRG: 356 | End: 2019-05-30
Attending: INTERNAL MEDICINE
Payer: MEDICARE

## 2019-05-30 PROBLEM — E83.42 HYPOMAGNESEMIA: Status: ACTIVE | Noted: 2019-05-30

## 2019-05-30 PROBLEM — G93.40 ENCEPHALOPATHY: Status: RESOLVED | Noted: 2019-03-08 | Resolved: 2019-05-30

## 2019-05-30 PROBLEM — L02.91 ABSCESS: Status: ACTIVE | Noted: 2019-05-30

## 2019-05-30 PROBLEM — R00.0 SINUS TACHYCARDIA: Status: RESOLVED | Noted: 2019-04-16 | Resolved: 2019-05-30

## 2019-05-30 PROBLEM — N39.0 UTI (URINARY TRACT INFECTION): Status: RESOLVED | Noted: 2018-12-14 | Resolved: 2019-05-30

## 2019-05-30 PROBLEM — S32.10XA SACRAL FRACTURE, CLOSED (HCC): Status: RESOLVED | Noted: 2018-12-14 | Resolved: 2019-05-30

## 2019-05-30 LAB
ALBUMIN SERPL BCP-MCNC: 3.6 G/DL (ref 3.2–4.9)
ALBUMIN/GLOB SERPL: 1 G/DL
ALP SERPL-CCNC: 229 U/L (ref 30–99)
ALT SERPL-CCNC: 29 U/L (ref 2–50)
ANION GAP SERPL CALC-SCNC: 9 MMOL/L (ref 0–11.9)
AST SERPL-CCNC: 43 U/L (ref 12–45)
BASOPHILS # BLD AUTO: 2 % (ref 0–1.8)
BASOPHILS # BLD: 0.09 K/UL (ref 0–0.12)
BILIRUB SERPL-MCNC: 0.5 MG/DL (ref 0.1–1.5)
BUN SERPL-MCNC: 22 MG/DL (ref 8–22)
CALCIUM SERPL-MCNC: 10.6 MG/DL (ref 8.5–10.5)
CHLORIDE SERPL-SCNC: 98 MMOL/L (ref 96–112)
CO2 SERPL-SCNC: 25 MMOL/L (ref 20–33)
CREAT SERPL-MCNC: 0.57 MG/DL (ref 0.5–1.4)
EOSINOPHIL # BLD AUTO: 0.23 K/UL (ref 0–0.51)
EOSINOPHIL NFR BLD: 5.1 % (ref 0–6.9)
ERYTHROCYTE [DISTWIDTH] IN BLOOD BY AUTOMATED COUNT: 61.2 FL (ref 35.9–50)
GLOBULIN SER CALC-MCNC: 3.7 G/DL (ref 1.9–3.5)
GLUCOSE SERPL-MCNC: 88 MG/DL (ref 65–99)
GRAM STN SPEC: NORMAL
HCT VFR BLD AUTO: 39.5 % (ref 37–47)
HGB BLD-MCNC: 12.9 G/DL (ref 12–16)
IMM GRANULOCYTES # BLD AUTO: 0.04 K/UL (ref 0–0.11)
IMM GRANULOCYTES NFR BLD AUTO: 0.9 % (ref 0–0.9)
LYMPHOCYTES # BLD AUTO: 0.88 K/UL (ref 1–4.8)
LYMPHOCYTES NFR BLD: 19.3 % (ref 22–41)
MAGNESIUM SERPL-MCNC: 1.2 MG/DL (ref 1.5–2.5)
MCH RBC QN AUTO: 27.7 PG (ref 27–33)
MCHC RBC AUTO-ENTMCNC: 32.7 G/DL (ref 33.6–35)
MCV RBC AUTO: 84.9 FL (ref 81.4–97.8)
MONOCYTES # BLD AUTO: 0.6 K/UL (ref 0–0.85)
MONOCYTES NFR BLD AUTO: 13.2 % (ref 0–13.4)
NEUTROPHILS # BLD AUTO: 2.71 K/UL (ref 2–7.15)
NEUTROPHILS NFR BLD: 59.5 % (ref 44–72)
NRBC # BLD AUTO: 0 K/UL
NRBC BLD-RTO: 0 /100 WBC
PLATELET # BLD AUTO: 252 K/UL (ref 164–446)
PMV BLD AUTO: 10.5 FL (ref 9–12.9)
POTASSIUM SERPL-SCNC: 4.1 MMOL/L (ref 3.6–5.5)
PROT SERPL-MCNC: 7.3 G/DL (ref 6–8.2)
RBC # BLD AUTO: 4.65 M/UL (ref 4.2–5.4)
SIGNIFICANT IND 70042: NORMAL
SITE SITE: NORMAL
SODIUM SERPL-SCNC: 132 MMOL/L (ref 135–145)
SOURCE SOURCE: NORMAL
WBC # BLD AUTO: 4.6 K/UL (ref 4.8–10.8)

## 2019-05-30 PROCEDURE — A9270 NON-COVERED ITEM OR SERVICE: HCPCS | Performed by: INTERNAL MEDICINE

## 2019-05-30 PROCEDURE — 700102 HCHG RX REV CODE 250 W/ 637 OVERRIDE(OP): Performed by: INTERNAL MEDICINE

## 2019-05-30 PROCEDURE — 700102 HCHG RX REV CODE 250 W/ 637 OVERRIDE(OP): Performed by: NURSE PRACTITIONER

## 2019-05-30 PROCEDURE — 80053 COMPREHEN METABOLIC PANEL: CPT

## 2019-05-30 PROCEDURE — A9270 NON-COVERED ITEM OR SERVICE: HCPCS | Performed by: NURSE PRACTITIONER

## 2019-05-30 PROCEDURE — 99153 MOD SED SAME PHYS/QHP EA: CPT

## 2019-05-30 PROCEDURE — 700111 HCHG RX REV CODE 636 W/ 250 OVERRIDE (IP)

## 2019-05-30 PROCEDURE — 0W9J3ZX DRAINAGE OF PELVIC CAVITY, PERCUTANEOUS APPROACH, DIAGNOSTIC: ICD-10-PCS | Performed by: RADIOLOGY

## 2019-05-30 PROCEDURE — 87205 SMEAR GRAM STAIN: CPT

## 2019-05-30 PROCEDURE — 87070 CULTURE OTHR SPECIMN AEROBIC: CPT

## 2019-05-30 PROCEDURE — 99233 SBSQ HOSP IP/OBS HIGH 50: CPT | Performed by: INTERNAL MEDICINE

## 2019-05-30 PROCEDURE — 83735 ASSAY OF MAGNESIUM: CPT

## 2019-05-30 PROCEDURE — 99232 SBSQ HOSP IP/OBS MODERATE 35: CPT | Performed by: INTERNAL MEDICINE

## 2019-05-30 PROCEDURE — 85025 COMPLETE CBC W/AUTO DIFF WBC: CPT

## 2019-05-30 PROCEDURE — 770006 HCHG ROOM/CARE - MED/SURG/GYN SEMI*

## 2019-05-30 PROCEDURE — BW2GZZZ COMPUTERIZED TOMOGRAPHY (CT SCAN) OF PELVIC REGION: ICD-10-PCS | Performed by: RADIOLOGY

## 2019-05-30 PROCEDURE — 700111 HCHG RX REV CODE 636 W/ 250 OVERRIDE (IP): Performed by: NURSE PRACTITIONER

## 2019-05-30 PROCEDURE — 700111 HCHG RX REV CODE 636 W/ 250 OVERRIDE (IP): Performed by: RADIOLOGY

## 2019-05-30 RX ORDER — MAGNESIUM SULFATE HEPTAHYDRATE 40 MG/ML
4 INJECTION, SOLUTION INTRAVENOUS ONCE
Status: COMPLETED | OUTPATIENT
Start: 2019-05-30 | End: 2019-05-30

## 2019-05-30 RX ORDER — NALOXONE HYDROCHLORIDE 0.4 MG/ML
INJECTION, SOLUTION INTRAMUSCULAR; INTRAVENOUS; SUBCUTANEOUS
Status: COMPLETED
Start: 2019-05-30 | End: 2019-05-30

## 2019-05-30 RX ORDER — ONDANSETRON 2 MG/ML
4 INJECTION INTRAMUSCULAR; INTRAVENOUS PRN
Status: ACTIVE | OUTPATIENT
Start: 2019-05-30 | End: 2019-05-30

## 2019-05-30 RX ORDER — MIDAZOLAM HYDROCHLORIDE 1 MG/ML
.5-2 INJECTION INTRAMUSCULAR; INTRAVENOUS PRN
Status: ACTIVE | OUTPATIENT
Start: 2019-05-30 | End: 2019-05-30

## 2019-05-30 RX ORDER — OXYCODONE HYDROCHLORIDE 5 MG/1
5 TABLET ORAL EVERY 4 HOURS PRN
Status: DISCONTINUED | OUTPATIENT
Start: 2019-05-30 | End: 2019-06-01

## 2019-05-30 RX ORDER — MIDAZOLAM HYDROCHLORIDE 1 MG/ML
INJECTION INTRAMUSCULAR; INTRAVENOUS
Status: COMPLETED
Start: 2019-05-30 | End: 2019-05-30

## 2019-05-30 RX ORDER — SODIUM CHLORIDE 9 MG/ML
500 INJECTION, SOLUTION INTRAVENOUS
Status: ACTIVE | OUTPATIENT
Start: 2019-05-30 | End: 2019-05-30

## 2019-05-30 RX ADMIN — MAGNESIUM SULFATE IN WATER 4 G: 40 INJECTION, SOLUTION INTRAVENOUS at 11:50

## 2019-05-30 RX ADMIN — CINACALCET HYDROCHLORIDE 60 MG: 30 TABLET, COATED ORAL at 04:50

## 2019-05-30 RX ADMIN — OMEPRAZOLE 20 MG: 20 CAPSULE, DELAYED RELEASE ORAL at 04:51

## 2019-05-30 RX ADMIN — MIDAZOLAM 1 MG: 1 INJECTION INTRAMUSCULAR; INTRAVENOUS at 15:17

## 2019-05-30 RX ADMIN — FENTANYL CITRATE 50 MCG: 50 INJECTION, SOLUTION INTRAMUSCULAR; INTRAVENOUS at 15:23

## 2019-05-30 RX ADMIN — MIDAZOLAM 1 MG: 1 INJECTION INTRAMUSCULAR; INTRAVENOUS at 15:23

## 2019-05-30 RX ADMIN — ATORVASTATIN CALCIUM 10 MG: 10 TABLET, FILM COATED ORAL at 18:15

## 2019-05-30 RX ADMIN — FENTANYL CITRATE 50 MCG: 50 INJECTION, SOLUTION INTRAMUSCULAR; INTRAVENOUS at 15:17

## 2019-05-30 RX ADMIN — METOPROLOL TARTRATE 25 MG: 25 TABLET, FILM COATED ORAL at 18:16

## 2019-05-30 RX ADMIN — LOSARTAN POTASSIUM 50 MG: 50 TABLET ORAL at 04:51

## 2019-05-30 RX ADMIN — MIDAZOLAM HYDROCHLORIDE 1 MG: 1 INJECTION, SOLUTION INTRAMUSCULAR; INTRAVENOUS at 15:17

## 2019-05-30 RX ADMIN — ACETAMINOPHEN 650 MG: 325 TABLET, FILM COATED ORAL at 04:50

## 2019-05-30 RX ADMIN — FENTANYL CITRATE 50 MCG: 50 INJECTION INTRAMUSCULAR; INTRAVENOUS at 15:17

## 2019-05-30 RX ADMIN — METOPROLOL TARTRATE 25 MG: 25 TABLET, FILM COATED ORAL at 04:51

## 2019-05-30 RX ADMIN — AMLODIPINE BESYLATE 10 MG: 5 TABLET ORAL at 04:51

## 2019-05-30 RX ADMIN — OXYCODONE HYDROCHLORIDE 5 MG: 5 TABLET ORAL at 20:59

## 2019-05-30 ASSESSMENT — ENCOUNTER SYMPTOMS
NAUSEA: 0
SORE THROAT: 0
TINGLING: 0
CHILLS: 0
FOCAL WEAKNESS: 0
COUGH: 0
DEPRESSION: 0
NERVOUS/ANXIOUS: 0
SPEECH CHANGE: 0
PALPITATIONS: 0
INSOMNIA: 0
BRUISES/BLEEDS EASILY: 0
FEVER: 0
ORTHOPNEA: 0
ABDOMINAL PAIN: 1
DIARRHEA: 1
SHORTNESS OF BREATH: 0
WHEEZING: 0
DIZZINESS: 0
PND: 0
DIAPHORESIS: 0
WEAKNESS: 0
VOMITING: 0
HEADACHES: 0
SENSORY CHANGE: 0

## 2019-05-30 NOTE — PROGRESS NOTES
2 RN skin check complete Yoly rn  Devices in place right picc line, glasses  Skin assessed under devices na.  Confirmed pressure ulcers found on na  New potential pressure ulcers noted on na. Wound consult placed na  The following interventions in place able to self turn    Dusky bilateral lower extremities , boggy red blanching heels/ankles.   Scarring to bilat breasts

## 2019-05-30 NOTE — PROGRESS NOTES
Spiritual Care Note    Patient Information     Patient's Name: Muriel Martini   MRN: [id number]    YOB: 1950   Age and Gender: 68 y.o. female   Service Area:    Room (and Bed): [place] [address]   Ethnicity or Nationality: German    Primary Language: English   Pentecostal/Spiritual preference: Other Corinna/World Buddhist   Place of Residence:    Family/Friends/Others Present:    Clinical Team Present:    Medical Diagnosis(-es)/Procedure(s):    Code Status: DNAR/DNI    Date of Admission: 5/29/2019   Length of Stay: 1 days        Spiritual Care Provider Information:  Name of Spiritual Care Provider: Rupert  Title of Spiritual Care Provider:   Phone Number: [phone]  E-mail: [email or web address]  Total time : 10 minutes    Spiritual Screen Results:    Gen Nursing  Spiritual Screen  Is your spiritual health or inner well-being important to you as you cope with your medical condition?: Yes  Would you like to receive a visit from our Spiritual Care team or your own Taoism or spiritual leader?: Yes  Was spiritual care education provided to the patient?: Declined  Sources of Hope/Dominique: Ceremony/Ritual, Family     Palliative Care  PC Pentecostal/Spiritual Screening  Was spiritual care education provided to the patient?: Declined      Encounter/Request Information  Encounter/Request Type   Visited With: Patient  Nature of the Visit: Initial, On shift  Continue Visiting: Yes  General Visit: Yes  Referral From/ Origin of Request: Epic nursing    Religous Needs/Values  Pentecostal Needs Visit  Pentecostal Needs: Prayer  Ritual Needs Visit  Ritual Needs: Communion    Spiritual Assessment   Spiritual Care Encounters  Observations/Symptoms: Accepting  Interacton/Conversation:  (Patient wanted prayer for surgery being done today.)  Assessment: Need  Need: Corinna Issues  Intended Effects: Convey a Calming Presence  Outcomes: Coping  Plan: Visit Upon Request    Notes:

## 2019-05-30 NOTE — PROGRESS NOTES
Admitted as a direct admit around 17:00 on day shift. Alert x4 and able to let her needs known, bed alarm placed for mod fall risk and assistance needed out of bed. Admit completed, call light within reach and visual checks through the night

## 2019-05-30 NOTE — PROGRESS NOTES
Assumed care of pt at shift change. Pt awake, alert and oriented x4. No complaints of pain/discomfort at this time. PICC line noted to KATE MILLER and running NS @ 20mL/hr. Labs drawn from PICC, good blood return noted. Pt wondering when abscess drainage will occur as pt wants to eat something. Pt educated that no time has been set yet, but will continue checking with IR and will keep updating pt. Pt denies any further needs at this time. Call light and personal belongings within reach, treaded socks on, bed locked in lowest position.

## 2019-05-30 NOTE — PROGRESS NOTES
Med Rec Updated and Complete per Pt's MAR from [facility]  Allergies Reviewed with Pt  UNK PO ABX HX.    Pt taking Lovenox 40mg every 12 hours with her last dose 05/29/19 @ 0900 also taking Xarelto 15mg nightly with her last dose 05/28/19 @ 1700.

## 2019-05-30 NOTE — OR SURGEON
Immediate Post- Operative Note        PostOp Diagnosis: pelvic abscess      Procedure(s): CT guided RIGHT pelvic aspiration -- 2mL pus to lab      Estimated Blood Loss: Less than 5 ml        Complications: None            5/30/2019     3:30 PM     Donovan Finley

## 2019-05-30 NOTE — CARE PLAN
Problem: Nutritional:  Goal: Achieve adequate nutritional intake  Start on PO diet and Patient will consume >50% of meals  Outcome: NOT MET  See RD note

## 2019-05-30 NOTE — PROGRESS NOTES
CT Nursing Note:    Patient consented by Dr. Finley, all questions answered. Dr. Finley  performed right side pelvic abscess fluid aspiration.  Aspirate x 2mL taken by Willie RT to microbiology for gram stain and C&S.  The pt tolerated the procedure well; ETCo2 baseline 30, with consistent waveform during the procedure.  Gauze and tegaderm applied to RLQ, CDI and soft.  Pt alert  and verbally appropriate post procedure, vital signs stable during procedure  and transport, see flow sheet for vital signs.  Report given to PATRICK Morrow.  RN transported pt to [facility] with Sao2 monitor.

## 2019-05-30 NOTE — CARE PLAN
Problem: Safety  Goal: Will remain free from injury  Outcome: PROGRESSING AS EXPECTED  Bed alarm placed for assistance out of bed    Problem: Knowledge Deficit  Goal: Knowledge of disease process/condition, treatment plan, diagnostic tests, and medications will improve  Outcome: PROGRESSING AS EXPECTED  Educate about NPO status and plan for IR procedure tomorrow

## 2019-05-30 NOTE — ASSESSMENT & PLAN NOTE
CT guided pelvic aspiration 5/30/2019  Hardware removal, pelvis 6/5/2019  CT guided deep bone biopsy 6/19/2019  CT guided aspiration/biopsy of a R gluteal fluid collection/phlegmon 7/11/2019 - consistent with TB  Continue Isoniazid, Rifampin, Ethambutol, Pyrazinamide  Associated pain improving  Levaquin to continue for 9-12 months    Continue antibiotic, guidance of antibiotic therapy per infectious disease team

## 2019-05-30 NOTE — PROGRESS NOTES
Hospital Medicine Daily Progress Note    Date of Service  5/30/2019    Chief Complaint  Persistent abscess    Hospital Course   Ms. Martini is a 68-year-old female who was transferred from [facility] on 5/29/2019 with persistent abscesses.  She initially presented with pelvic pain, low back pain, and confusion. She also had new slurred speech. Imaging of the abdomen, pelvis and brain revealed abscesses and she was treated with a full course of IV antibiotics per ID.  Her slurred speech improved and she was able to ambulate with physical therapy. However, repeat imaging showed persistent pelvic abscesses and new brain enhancement. Cultures remained negative and a CHAS done by Dr. Potter showed no vegetations. The size of the pelvic abscesses was reviewed by interventional radiology and the case was discussed with Dr. Finley who recommended transfer to [facility] for CT guided drainage.       Interval Problem Update  Feeling good, having some pain in the area of her left hip/thigh which is where she states the abscess is. Had some diarrhea yesterday & overnight. Now just having soft smears. Hungry.     WBC stable at 4.6, sodium 134-> 132 overnight, renal function is intact, calcium and alkaline phosphatase levels are mildly elevated.  Mag low at 1.2 and repletion has been ordered.    Afebrile overnight, HR 60s, SBP 120s-140s, O2 saturations 93-95% on room air.    NPO for abscess drainage today. Culture ordered.     Consultants/Specialty  Interventional radiology    Code Status  DNAR/DNI    Disposition  Clinical for now.  Anticipate transfer to [facility] when medically clear.  Per report she has been accepted there.    Review of Systems  Review of Systems   Constitutional: Negative for chills, diaphoresis, fever and malaise/fatigue.   HENT: Negative for congestion, nosebleeds and sore throat.    Respiratory: Negative for cough, shortness of breath and wheezing.    Cardiovascular: Negative for chest pain, palpitations,  orthopnea, leg swelling and PND.   Gastrointestinal: Positive for abdominal pain and diarrhea. Negative for nausea and vomiting.   Genitourinary: Negative for dysuria, frequency, hematuria and urgency.   Musculoskeletal:        Left hip/thigh pain   Neurological: Negative for dizziness, tingling, sensory change, speech change, focal weakness, weakness and headaches.   Endo/Heme/Allergies: Does not bruise/bleed easily.   Psychiatric/Behavioral: Negative for depression. The patient is not nervous/anxious and does not have insomnia.    All other systems reviewed and are negative.     Physical Exam  Temp:  [36.2 °C (97.2 °F)-36.6 °C (97.8 °F)] 36.6 °C (97.8 °F)  Pulse:  [60-78] 60  Resp:  [16-17] 16  BP: (128-149)/(67-72) 145/72  SpO2:  [92 %-95 %] 93 %    Physical Exam   Constitutional: She is oriented to person, place, and time. She appears well-developed and well-nourished. She is active and cooperative. She does not appear ill. No distress.   HENT:   Head: Normocephalic and atraumatic.   Eyes: Pupils are equal, round, and reactive to light. Conjunctivae are normal. Right eye exhibits no discharge. Left eye exhibits no discharge. No scleral icterus.   Neck: Normal range of motion. Neck supple. No JVD present.   Cardiovascular: Normal rate, regular rhythm, normal heart sounds and intact distal pulses.  Exam reveals no gallop and no friction rub.    No murmur heard.  Pulmonary/Chest: Effort normal and breath sounds normal. No accessory muscle usage or stridor. No tachypnea. No respiratory distress. She has no decreased breath sounds. She has no wheezes. She has no rhonchi. She has no rales.   Abdominal: Soft. Bowel sounds are normal. She exhibits no distension. There is tenderness (very mild tenderness in her epigastric region) in the epigastric area. There is no rigidity, no rebound and no guarding.   Musculoskeletal: Normal range of motion. She exhibits no edema.   Neurological: She is alert and oriented to person,  place, and time. She has normal strength. No cranial nerve deficit or sensory deficit. Coordination normal. GCS eye subscore is 4. GCS verbal subscore is 5. GCS motor subscore is 6.   Skin: Skin is warm and dry. No rash noted. She is not diaphoretic. No erythema. No pallor.   Psychiatric: She has a normal mood and affect. Her speech is normal and behavior is normal. Judgment and thought content normal. Cognition and memory are normal.   Nursing note and vitals reviewed.    Fluids    Intake/Output Summary (Last 24 hours) at 05/30/19 1338  Last data filed at 05/29/19 1940   Gross per 24 hour   Intake              240 ml   Output                0 ml   Net              240 ml     Laboratory  Recent Labs      05/28/19   0334  05/30/19   1005   WBC  4.5*  4.6*   RBC  4.84  4.65   HEMOGLOBIN  13.1  12.9   HEMATOCRIT  40.6  39.5   MCV  83.9  84.9   MCH  27.1  27.7   MCHC  32.3*  32.7*   RDW  61.3*  61.2*   PLATELETCT  234  252   MPV  10.0  10.5     Recent Labs      05/28/19   0334  05/30/19   1005   SODIUM  134*  132*   POTASSIUM  4.0  4.1   CHLORIDE  101  98   CO2  23  25   GLUCOSE  84  88   BUN  23*  22   CREATININE  0.55  0.57   CALCIUM  10.4*  10.6*     Recent Labs      05/28/19   0334   INR  0.97     Imaging  CT-DRAIN-PERITONEAL    (Results Pending)      Assessment/Plan  * Abscess of right iliac muscle and right gluteus medius muscle- (present on admission)   Assessment & Plan    Planning for CT guided abscess drainage today.    Culture ordered.      Hypomagnesemia- (present on admission)   Assessment & Plan    Repleted IV x 1 today & will recheck tomorrow.      Hypercalcemia- (present on admission)   Assessment & Plan    Chronic. Continue sensipar.      Essential hypertension- (present on admission)   Assessment & Plan    Well controlled on current regimen. Continue home losartan, metoprolol, & amlodipine.      Chronic hyponatremia- (present on admission)   Assessment & Plan    At baseline, continue to monitor.       History of breast cancer- (present on admission)   Assessment & Plan    S/p left mastectomy. Breast implant present there now.      COPD (chronic obstructive pulmonary disease) (HCC)- (present on admission)   Assessment & Plan    Stable. Doing well on room air. No respiratory distress/SOB. Lungs clear throughout. No evidence of exacerbation but will continue to monitor.         VTE prophylaxis: Lovenox

## 2019-05-30 NOTE — PROGRESS NOTES
Pt returned to unit. Report received. Pt alert and oriented. Pt denies pain or discomfort. Per order, pt bed bound for two hours post procedure. Pt educated on this order. Post op vitals in place. Dressing in place to incision site, clean dry and intact.

## 2019-05-30 NOTE — CARE PLAN
Problem: Safety  Goal: Will remain free from injury  Outcome: PROGRESSING AS EXPECTED  Bed alarm placed for safety and assistance out of bed    Problem: Mobility  Goal: Risk for activity intolerance will decrease  Outcome: PROGRESSING AS EXPECTED  Assistance needed out of bed and was mobilizing with 1 person assist FWW

## 2019-05-30 NOTE — DIETARY
"Nutrition services: Day 1 of admit.  Muriel Martini is a 68 y.o. female with admitting DX of iliac abscess  Consult received for Poor PO/ wt loss    RD able to visit pt at bedside. Pt seemed confused during interview, accuracy of information obtained may be limited. Pt states has poor appetite for about a month, says unsure how it compares to usual-relays still eating food. Says lost 80 lbs, says UBW is anywhere from 150-180 lbs, says last weighed 150 lbs 5 months ago. Reports weight loss just related to poor appetite.     Assessment:  Height: 157.5 cm (5' 2\")  Weight: 54.4 kg (119 lb 14.9 oz)-via Bed Scale  Body mass index is 21.94 kg/m²., BMI classification: normal  Diet/Intake: NPO for IR procedure/ drainage per IR    Evaluation:   1. Hx of leison of brain, mass of iliopsoas muscle, encephalopathy, COPD, UTI, DMII. Per H&P, pt with brain abscesses on MRI.   2. Pt endoreses poor appetite for 1 month, though does not quantify amount compared to usual intake, unable to meet criteria at this time.   3. Per computer hx, per previous hospital admission RD note, pt weighed 64.6 kg via Bed Scale on 4/20/19. This indicates a potential 10.2 kg (22 lb) weight loss in ~1 month, which is 15%, which is severe. Per nutrition focused physical assessment, pt had some notable muscle wasting at temporal region and some mild fat loss at orbital fat pads. Temporal and shoulder region were adequately nourished.   4. Labs: Na 132, Magnesium 1.2   5. Meds: magnesium sulfate, senokot-refused, no continuous meds  6. Last BM: 5/30    Malnutrition Risk: Pt with moderate malnutriton in context of chronic illness related to multiple abscesses as evidenced by potential 15% weight loss in 1 month and moderate muscle wasting at temporal region and mild fat loss at orbital fat pads.      Recommendations/Plan:  1. Recommend start PO diet as medically feasible  2. Monitor weight    RD following              "

## 2019-05-30 NOTE — RESPIRATORY CARE
COPD EDUCATION by COPD CLINICAL EDUCATOR  5/30/2019 at 5:40 AM by Genevieve Atkinson     Patient reviewed by COPD education team. Patient does not have a history or diagnosis of COPD and is a non-smoker, therefore does not qualify for the COPD program.

## 2019-05-30 NOTE — PROGRESS NOTES
Infectious Disease Progress Note    Author: Karen Garcia M.D. Date & Time of service: 5/30/2019  12:04 PM    Chief Complaint:  Brain abscess, iliopsoas abscess, leukopenia      Interval History:  68 y.o. female admitted 5/29/2019 as a transfer from [facility].  She has bben there since 4/30/2019. + diabetes, SANTOSH of right hip with hardware, and breast cancer who was originally admitted to [facility] on 03/08/2019 for right hip and pelvic pain.  Work-up revealed a right iliopsoas lesion, gluteal abscess, and mult brain abscesses  5/6 Interval 24 hours of Vitals/Labs/Micro,and imaging results reviewed as available. See assessment.   5/10 AF WBC 3 continued c/o constipation denies HA, visual changes, neuro deficits  5/11 AF WBC 8.8 isolation stopped due to resolution neutropenia-sleepy today  5/12 AF no CBC somnolent-no acute events noted  5/13 AF WBC 6.2 c/o pain left hip today, unchanged Worse with movement and improved with ice. Refused PT yesterday   5/14 AF c/o dizzyness whenever she tirns on her side-no new HA or visual changes-still havng hip pain  5/15 AF sleeping at time of rounds-by report ambulating  5/16 AF weightbearing continues to improve-ambulating more.  No new complaints  5/20- still has some pain in the hip but ambulating without any issues. No fevers. In good spirits.  5/22/2019 continues to have hip pain.  No fevers have been noted  5/24 AF no CBC plan for CHAS today. Denies any headaches. States she is ambulating  5/25 afebrile CBC not done today.  Patient CHAS was negative.  She has a poor appetite and is requesting an appetite stimulant.  Ongoing left hip pain.  Plan for CT-guided drainage tomorrow  5/28 afebrile WBC 4.5.  Patient is resting comfortably.  She states that she has right breast pain.  Ultrasound of the breast otherwise unremarkable.  She is awaiting CT-guided drainage which has been delayed as CT scan malfunctioning yesterday.  5/29 afebrile, no CBC.  Patient denies any right hip  pain.  Continues to have left-sided hip pain especially with sitting. She had a repeat CT of the pelvis yesterday that revealed no changes in the abscess.  Per report, abscesses are too small for IR drainage given location and recommendation to transfer the patient to [facility] for CT-guided aspiration of the fluid for culture.  She continues to deny any fevers or chills.   AF c/o hungry and right hip pain Awaiting procedure. Denies SE abx    Labs Reviewed, Medications Reviewed, Radiology Reviewed and Wound Reviewed.    Review of Systems:  Review of Systems   Constitutional: Negative for chills and fever.   Gastrointestinal: Negative for nausea and vomiting.   Musculoskeletal: Positive for joint pain.   All other systems reviewed and are negative.      Hemodynamics:  Temp (24hrs), Av.4 °C (97.6 °F), Min:36.2 °C (97.2 °F), Max:36.6 °C (97.8 °F)  Temperature: 36.6 °C (97.8 °F)  Pulse  Av  Min: 60  Max: 78   Blood Pressure : 145/72       Physical Exam:  Physical Exam   Constitutional: She is oriented to person, place, and time. No distress.   HENT:   Mouth/Throat: No oropharyngeal exudate.   Eyes: Pupils are equal, round, and reactive to light. EOM are normal.   Neck: Neck supple.   Cardiovascular: Normal rate.    Pulmonary/Chest: Effort normal. No respiratory distress.   Abdominal: Soft. She exhibits no distension.   Musculoskeletal: She exhibits tenderness. She exhibits no edema.   Neurological: She is alert and oriented to person, place, and time.   Skin: Skin is warm. She is not diaphoretic.   Nursing note and vitals reviewed.      Meds:    Current Facility-Administered Medications:   •  magnesium sulfate  •  amLODIPine  •  cinacalcet  •  losartan  •  acetaminophen  •  atorvastatin  •  omeprazole  •  metoprolol  •  [START ON 2019] enoxaparin (LOVENOX) injection  •  senna-docusate **AND** polyethylene glycol/lytes **AND** magnesium hydroxide **AND** bisacodyl  •  ondansetron  •   ondansetron    Labs:  Recent Labs      05/28/19   0334  05/30/19   1005   WBC  4.5*  4.6*   RBC  4.84  4.65   HEMOGLOBIN  13.1  12.9   HEMATOCRIT  40.6  39.5   MCV  83.9  84.9   MCH  27.1  27.7   RDW  61.3*  61.2*   PLATELETCT  234  252   MPV  10.0  10.5   NEUTSPOLYS   --   59.50   LYMPHOCYTES   --   19.30*   MONOCYTES   --   13.20   EOSINOPHILS   --   5.10   BASOPHILS   --   2.00*     Recent Labs      05/28/19   0334  05/30/19   1005   SODIUM  134*  132*   POTASSIUM  4.0  4.1   CHLORIDE  101  98   CO2  23  25   GLUCOSE  84  88   BUN  23*  22     Recent Labs      05/28/19   0334  05/30/19   1005   ALBUMIN  3.2  3.6   TBILIRUBIN  0.5  0.5   ALKPHOSPHAT  226*  229*   TOTPROTEIN  7.2  7.3   ALTSGPT  33  29   ASTSGOT  54*  43   CREATININE  0.55  0.57       Imaging:  Ct-pelvis With    Result Date: 5/28/2019 5/28/2019 12:54 PM HISTORY/REASON FOR EXAM: Follow-up abscess TECHNIQUE/EXAM DESCRIPTION AND NUMBER OF VIEWS: CT scan of the pelvis with contrast. Contrast-enhanced helical scanning of the pelvis was obtained from the iliac crests through the pubic symphysis following the bolus administration of 100 mL of Omnipaque 350 nonionic contrast without complication. Low dose optimization technique was utilized for this CT exam including automated exposure control and adjustment of the mA and/or kV according to patient size. COMPARISON: MRI scan of the pelvis 5/20/2019 and CT chest abdomen and pelvis 4/23/2019 FINDINGS: Again redemonstrated is a 3.2 x 2 cm multiloculated low-attenuation collection in the right iliacus muscle unchanged from recent MRI scan of the pelvis. There is also a 2.8 x 1.7 cm low-attenuation collection in the right gluteus medius muscle. This collection is also unchanged from recent MRI scan of the pelvis. Previous fixation screws are noted coursing through the right lateral ilium across the sacrum extending into the left lateral ilium. There is a healing fracture of the right posterior ilium. There is  no evidence of free fluid in the pelvis or pelvic mass. There are scattered air-fluid levels throughout the small bowel     1.  Stable abscesses again redemonstrated in the right iliac's muscle and right gluteus medius muscle. 2.  Status post fixation screws coursing through the jose antonio and sacrum for treatment of healing fracture in the right posterior ilium. 3.  Adynamic ileus.    Dx-chest-portable (1 View)    Result Date: 5/21/2019 5/21/2019 2:30 PM HISTORY/REASON FOR EXAM:  Right-sided neck pain. TECHNIQUE/EXAM DESCRIPTION AND NUMBER OF VIEWS: Single portable view of the chest. COMPARISON: 4/30/2019 FINDINGS: Single portable view of the chest demonstrates a normal cardiac silhouette and mediastinal contours. Calcification is in the aortic knob. No discrete opacity, pleural fluid, or pneumothorax. A right PICC line remains in place. The tip appears to be located at the cavoatrial junction.     1.  No acute cardiopulmonary findings. 2.  Right PICC line place with the tip projecting over the cavoatrial junction    Dx-chest-portable (1 View)    Result Date: 4/30/2019 4/30/2019 4:19 PM HISTORY/REASON FOR EXAM:  PICC line placement. IV access necessity. TECHNIQUE/EXAM DESCRIPTION AND NUMBER OF VIEWS: Single portable view of the chest. COMPARISON:  4/22/2019 FINDINGS: A right arm PICC line is present in satisfactory position with its tip projecting over the SVC at the level of the jennifer. The cardiac silhouette is within normal limits. No infiltrates or consolidations are noted. No pleural effusion is noted.     1.  Right arm PICC line tip projects in satisfactory position. 2.  Clear chest.    Mr-brain-with & W/o    Result Date: 5/21/2019 5/20/2019 3:37 PM HISTORY/REASON FOR EXAM:  Follow-up brain abscesses. TECHNIQUE/EXAM DESCRIPTION:   MRI of the brain without and with contrast. T1 sagittal, T2 fast spin-echo axial, T1 coronal, FLAIR coronal, diffusion-weighted and apparent diffusion coefficient (ADC map) axial  images were obtained of the whole brain. T1 postcontrast axial and T1 postcontrast coronal images were obtained. The study was performed on a [employer] 1.5 Miranda MRI scanner. 6 mL Gadavist contrast was administered intravenously. COMPARISON:  MRI scan of the brain 4/24/2019 FINDINGS: There are innumerable small ovoid enhancing foci throughout the brain parenchyma, both in the supratentorial and infratentorial compartments. The number of these lesions has increased significantly since previous exam. The previously identified largest lesion in the right brachium pontis has resolved. There is evidence of increased T2 signal intensity in many of these lesions and there is surrounding increased T2 signal intensity in several lesions in the right superior frontal lobe. The calvariae are unremarkable. There are no extra-axial fluid collections. The ventricular system and basal cisterns are mild to moderately prominent. There is mild-to-moderate prominence of the cortical sulci and gyri. There are no mass effects or shift of midline structures. There are no hemorrhagic lesions. The diffusion-weighted axial images show no evidence of acute cerebral infarction. The brainstem and posterior fossa structures are unremarkable. Vascular flow voids in the vertebrobasilar and carotid arteries, [place], and dural venous sinuses are intact. The paranasal sinuses and mastoids in the field of view are unremarkable.     1.  Increase in number of innumerable small ovoid enhancing lesions throughout the brain parenchyma. These lesions may represent microabscesses of either bacterial or fungal origin or possibly brain metastases. 2.  Age-related cerebral atrophy.    Mr-pelvis-with & W/o And Sequences    Result Date: 5/21/2019 5/20/2019 3:37 PM HISTORY/REASON FOR EXAM:  Abd pain, fever, abscess suspected; evaluate abscesses TECHNIQUE/EXAM DESCRIPTION: MRI of the pelvis without and with contrast. The study was performed on a [employer]  Signa 1.5 Miranda MRI scanner. T1 axial, T1 coronal, T2 fast spin-echo fat-suppressed axial, and T2 fast spin-echo fat-suppressed coronal images were obtained of the pelvis. Postcontrast fat-suppressed intravenous gadolinium enhanced sequences in the axial and coronal planes were then  performed. 6 mL Gadavist contrast was administered intravenously. COMPARISON: 3/26/2019. CT 4/23/2019 FINDINGS: Interval decrease in size of the collection in the right gluteal medius muscle, measuring 1.4 x 1.9 cm, previously 2.3 x 2.8 cm. There is minimal surrounding stranding. Mild heterogeneity and stranding in the overlying right gluteus cornelio muscle without discrete collection. Grossly unchanged in size of the multiloculated collection in the right iliacus muscle, measuring 2.4 x 3.5 cm. Postsurgical change in the sacrum with susceptibility artifact from the screws. Moderate osteoarthritis of the bilateral hip joints.     1. Interval decrease in size of the collection in the right gluteal medius muscle, measuring 1.4 x 1.9 cm, previously 2.3 x 2.8 cm. There is minimal surrounding stranding. Mild heterogeneity and stranding in the overlying right gluteus cornelio muscle without discrete collection, could relate to reactive change or phlegmon. 2. Grossly unchanged in size of the multiloculated collection in the right iliacus muscle, measuring 2.4 x 3.5 cm.    Us-chest    Result Date: 5/27/2019 5/27/2019 10:23 AM HISTORY/REASON FOR EXAM:  Right lateral breast pain, evaluate implant TECHNIQUE/EXAM DESCRIPTION AND NUMBER OF VIEWS: Targeted ultrasound of the right breast. COMPARISON: None FINDINGS: No gross mass or implant rupture are identified. Diagnostic assessment is not feasible lack of proper equipment tube performed breast imaging     1.  Nondiagnostic assessment of the right breast without gross implant rupture identified 2.  Recommend further assessment with mammography and ultrasound at an accredited breast  facility    Ec-halie W/o Cont    Result Date: 2019  Transesophageal Echo Report Echocardiography Laboratory CONCLUSIONS Normal esophageal echocardiogram. No evidence of endocarditis. No significant change since the prior study of 3/15/2019. MARLENA CLIFFORD Exam Date:          2019                     12:48 Exam Location:      Inpatient Priority:            Routine Ordering Physician:        DARON WHELAN Referring Physician: Sonographer:               CATALION Rivers Age:    68     Gender:    F MRN:    [id number] :    [id number] BSA:           Ht (in):           Wt (lb): Report Type:      Complete Indications:     Endocarditis and heart valve disorders in diseases                  classified elsewhere ICD Codes:       I39 CPT Codes:       50052 BP:          /          HR: Technical Quality:       Fair MEASUREMENTS  (Male / Female) Normal Values 2D ECHO Estimated LV Ejection Fraction    65 %                  * Indicates values subject to auto-interpretation LV EF:  65    % Medications Medications determined by anesthesiologist. Limitations none Complications none Proc. Components The probe was inserted and manipulated by Dr Whelan. Probe #SM  was used for this procedure. 2D, color Doppler, spectral Doppler, and 3D imaging were used as part of the evaluation as clinically indicated. FINDINGS Left Ventricle The left ventricle was normal in size and function. Right Ventricle The right ventricle was normal in size and function. Right Atrium The right atrium is normal in size. Left Atrium The left atrium is normal in size. LA Appendage Normal left atrial appendage. IA Septum The interatrial septum is normal. IV Septum The interventricular septum is normal. Mitral Valve Structurally normal mitral valve without significant stenosis or regurgitation.  No valvular vegetations. Aortic Valve Structurally normal aortic valve without significant  "stenosis or regurgitation.  No valvular vegetations. Tricuspid Valve Structurally normal tricuspid valve without significant stenosis or regurgitation.  No valvular vegetations. Pulmonic Valve Structurally normal pulmonic valve without significant stenosis or regurgitation.  No valvular vegetations. Pericardium Normal pericardium without effusion. Aorta The aortic root is normal. Christopher Potter MD (Electronically Signed) Final Date:     24 May 2019 15:42      Micro:  Results     ** No results found for the last 168 hours. **          Assessment:  Gluteal and iliopsoas abscess  Brain abscesses vs mets  Breast cancer  DM      Plan:  Gluteal and iliopsoas abscess s/p treatment.  Additional work-up ongoing  Afebrile  No leukocytosis  Adjacent to hardware in right hip (placed 12/17/18)  CT 4/23 \"Fluid collections within the right gluteal and iliacis muscles.. The collection within the right gluteal muscle has unchanged while the fluid collection within the right iliacis muscle is increased somewhat in size.\" 2.7 cm  Cultures 3/29 and 4/4 neg  MRI on 5/20/2019 - interval decrease in collection in R gluteal muscle and persistent multiloculated collection in R iliacus muscle.   CT 5/28 no change  Fluid collections at that time not amenable to IR drainage given size, nonetheless, transferred to [facility] to undergo CT-guided aspiration for culture  Hold abx pending aspiration for diagnosis     Brain abscesses vs mets  Afebrile  MRI 4/23 \"supra and infratentorial brain parenchyma... interval decrease in the size of the lesions. There are also interval reduction in the extent of the surrounding white matter edema in the restricted diffusion consistent with improvement/treatment response of the most of the multifocal brain abscesses.  Repeat MRI 5/21 showed innumerable microabscesses vs mets  Mult blood cultures neg  CHAS neg 3/15 and 5/24  No benefit of PET scan per notes  Abx (vancomycin, cefepime and Flagyl) " discontinued on 5/23  Monitor neuro status closely     History of pelvic fracture  Status post pin placement and sacral  Eventually will need removal     Type 2 DM  Hemoglobin A1c 6.3   Keep BS under 150 to help control current infection      I have performed a physical exam and reviewed and updated ROS and plan today 5/29/2019.  In review of yesterday's note 5/28/2019, there are no changes except as documented above.    Discussed with internal medicine.

## 2019-05-30 NOTE — CARE PLAN
Problem: Communication  Goal: The ability to communicate needs accurately and effectively will improve  Outcome: PROGRESSING AS EXPECTED  Pt A&Ox4. Call light within reach and pt able to make needs known.     Problem: Venous Thromboembolism (VTW)/Deep Vein Thrombosis (DVT) Prevention:  Goal: Patient will participate in Venous Thrombosis (VTE)/Deep Vein Thrombosis (DVT)Prevention Measures  Outcome: PROGRESSING AS EXPECTED  Pt receiving Lovenox for VTE prophylaxis.

## 2019-05-31 LAB
ANION GAP SERPL CALC-SCNC: 7 MMOL/L (ref 0–11.9)
BASOPHILS # BLD AUTO: 2.3 % (ref 0–1.8)
BASOPHILS # BLD: 0.11 K/UL (ref 0–0.12)
BUN SERPL-MCNC: 20 MG/DL (ref 8–22)
CALCIUM SERPL-MCNC: 10 MG/DL (ref 8.5–10.5)
CHLORIDE SERPL-SCNC: 100 MMOL/L (ref 96–112)
CO2 SERPL-SCNC: 27 MMOL/L (ref 20–33)
CREAT SERPL-MCNC: 0.54 MG/DL (ref 0.5–1.4)
EOSINOPHIL # BLD AUTO: 0.36 K/UL (ref 0–0.51)
EOSINOPHIL NFR BLD: 7.7 % (ref 0–6.9)
ERYTHROCYTE [DISTWIDTH] IN BLOOD BY AUTOMATED COUNT: 59.4 FL (ref 35.9–50)
GLUCOSE SERPL-MCNC: 85 MG/DL (ref 65–99)
HCT VFR BLD AUTO: 38.2 % (ref 37–47)
HGB BLD-MCNC: 12.5 G/DL (ref 12–16)
IMM GRANULOCYTES # BLD AUTO: 0.02 K/UL (ref 0–0.11)
IMM GRANULOCYTES NFR BLD AUTO: 0.4 % (ref 0–0.9)
LYMPHOCYTES # BLD AUTO: 0.91 K/UL (ref 1–4.8)
LYMPHOCYTES NFR BLD: 19.4 % (ref 22–41)
MAGNESIUM SERPL-MCNC: 1.8 MG/DL (ref 1.5–2.5)
MCH RBC QN AUTO: 27.5 PG (ref 27–33)
MCHC RBC AUTO-ENTMCNC: 32.7 G/DL (ref 33.6–35)
MCV RBC AUTO: 84.1 FL (ref 81.4–97.8)
MONOCYTES # BLD AUTO: 0.7 K/UL (ref 0–0.85)
MONOCYTES NFR BLD AUTO: 14.9 % (ref 0–13.4)
NEUTROPHILS # BLD AUTO: 2.6 K/UL (ref 2–7.15)
NEUTROPHILS NFR BLD: 55.3 % (ref 44–72)
NRBC # BLD AUTO: 0 K/UL
NRBC BLD-RTO: 0 /100 WBC
PLATELET # BLD AUTO: 247 K/UL (ref 164–446)
PMV BLD AUTO: 10.3 FL (ref 9–12.9)
POTASSIUM SERPL-SCNC: 3.8 MMOL/L (ref 3.6–5.5)
RBC # BLD AUTO: 4.54 M/UL (ref 4.2–5.4)
SODIUM SERPL-SCNC: 134 MMOL/L (ref 135–145)
WBC # BLD AUTO: 4.7 K/UL (ref 4.8–10.8)

## 2019-05-31 PROCEDURE — 700102 HCHG RX REV CODE 250 W/ 637 OVERRIDE(OP): Performed by: INTERNAL MEDICINE

## 2019-05-31 PROCEDURE — 85025 COMPLETE CBC W/AUTO DIFF WBC: CPT

## 2019-05-31 PROCEDURE — 94760 N-INVAS EAR/PLS OXIMETRY 1: CPT

## 2019-05-31 PROCEDURE — 770006 HCHG ROOM/CARE - MED/SURG/GYN SEMI*

## 2019-05-31 PROCEDURE — 700111 HCHG RX REV CODE 636 W/ 250 OVERRIDE (IP): Performed by: HOSPITALIST

## 2019-05-31 PROCEDURE — A9270 NON-COVERED ITEM OR SERVICE: HCPCS | Performed by: NURSE PRACTITIONER

## 2019-05-31 PROCEDURE — 83735 ASSAY OF MAGNESIUM: CPT

## 2019-05-31 PROCEDURE — 80048 BASIC METABOLIC PNL TOTAL CA: CPT

## 2019-05-31 PROCEDURE — 700102 HCHG RX REV CODE 250 W/ 637 OVERRIDE(OP): Performed by: NURSE PRACTITIONER

## 2019-05-31 PROCEDURE — 99232 SBSQ HOSP IP/OBS MODERATE 35: CPT | Performed by: INTERNAL MEDICINE

## 2019-05-31 PROCEDURE — A9270 NON-COVERED ITEM OR SERVICE: HCPCS | Performed by: HOSPITALIST

## 2019-05-31 PROCEDURE — 99233 SBSQ HOSP IP/OBS HIGH 50: CPT | Performed by: INTERNAL MEDICINE

## 2019-05-31 PROCEDURE — A9270 NON-COVERED ITEM OR SERVICE: HCPCS | Performed by: INTERNAL MEDICINE

## 2019-05-31 PROCEDURE — 700102 HCHG RX REV CODE 250 W/ 637 OVERRIDE(OP): Performed by: HOSPITALIST

## 2019-05-31 PROCEDURE — 700111 HCHG RX REV CODE 636 W/ 250 OVERRIDE (IP): Performed by: NURSE PRACTITIONER

## 2019-05-31 RX ORDER — MAGNESIUM SULFATE HEPTAHYDRATE 40 MG/ML
2 INJECTION, SOLUTION INTRAVENOUS ONCE
Status: COMPLETED | OUTPATIENT
Start: 2019-05-31 | End: 2019-05-31

## 2019-05-31 RX ADMIN — OMEPRAZOLE 20 MG: 20 CAPSULE, DELAYED RELEASE ORAL at 04:12

## 2019-05-31 RX ADMIN — OXYCODONE HYDROCHLORIDE 5 MG: 5 TABLET ORAL at 04:12

## 2019-05-31 RX ADMIN — OXYCODONE HYDROCHLORIDE 5 MG: 5 TABLET ORAL at 18:09

## 2019-05-31 RX ADMIN — METOPROLOL TARTRATE 25 MG: 25 TABLET, FILM COATED ORAL at 04:12

## 2019-05-31 RX ADMIN — CINACALCET HYDROCHLORIDE 60 MG: 30 TABLET, COATED ORAL at 04:12

## 2019-05-31 RX ADMIN — LOSARTAN POTASSIUM 50 MG: 50 TABLET ORAL at 04:12

## 2019-05-31 RX ADMIN — MAGNESIUM SULFATE 2 G: 2 INJECTION INTRAVENOUS at 11:32

## 2019-05-31 RX ADMIN — AMLODIPINE BESYLATE 10 MG: 5 TABLET ORAL at 04:12

## 2019-05-31 RX ADMIN — ENOXAPARIN SODIUM 40 MG: 100 INJECTION SUBCUTANEOUS at 04:12

## 2019-05-31 RX ADMIN — OXYCODONE HYDROCHLORIDE 5 MG: 5 TABLET ORAL at 22:37

## 2019-05-31 RX ADMIN — METOPROLOL TARTRATE 25 MG: 25 TABLET, FILM COATED ORAL at 18:03

## 2019-05-31 RX ADMIN — ATORVASTATIN CALCIUM 10 MG: 10 TABLET, FILM COATED ORAL at 18:03

## 2019-05-31 RX ADMIN — SENNOSIDES AND DOCUSATE SODIUM 2 TABLET: 8.6; 5 TABLET ORAL at 18:03

## 2019-05-31 RX ADMIN — OXYCODONE HYDROCHLORIDE 5 MG: 5 TABLET ORAL at 10:11

## 2019-05-31 RX ADMIN — ACETAMINOPHEN 650 MG: 325 TABLET, FILM COATED ORAL at 00:21

## 2019-05-31 ASSESSMENT — COPD QUESTIONNAIRES
DURING THE PAST 4 WEEKS HOW MUCH DID YOU FEEL SHORT OF BREATH: NONE/LITTLE OF THE TIME
HAVE YOU SMOKED AT LEAST 100 CIGARETTES IN YOUR ENTIRE LIFE: YES
COPD SCREENING SCORE: 4
DO YOU EVER COUGH UP ANY MUCUS OR PHLEGM?: NO/ONLY WITH OCCASIONAL COLDS OR INFECTIONS

## 2019-05-31 ASSESSMENT — ENCOUNTER SYMPTOMS
SHORTNESS OF BREATH: 0
FOCAL WEAKNESS: 0
SORE THROAT: 0
TINGLING: 0
DOUBLE VISION: 0
WEAKNESS: 1
BRUISES/BLEEDS EASILY: 0
NAUSEA: 0
PALPITATIONS: 0
CHILLS: 0
DIZZINESS: 0
COUGH: 0
VOMITING: 0
BLURRED VISION: 0
HEADACHES: 0
ABDOMINAL PAIN: 1
DIARRHEA: 1
SENSORY CHANGE: 0
TREMORS: 0
MYALGIAS: 1
SPEECH CHANGE: 0
FEVER: 0
DEPRESSION: 0
INSOMNIA: 0
NERVOUS/ANXIOUS: 0

## 2019-05-31 ASSESSMENT — LIFESTYLE VARIABLES: EVER_SMOKED: YES

## 2019-05-31 NOTE — PROGRESS NOTES
Assumed care of Pt at shift change. Pt is A&Ox4. Pt reports pain and was given medication. Call light within reach. Traction socks on pt and bed in lowest position.

## 2019-05-31 NOTE — PROGRESS NOTES
Alert and able to let her needs known, bed alarm in place. C/o left hip area pain ; oxycodone given as requested. Right lower abd dressing CDI from today's drainage. Cont plan of care, call light within reach and visual checks through the night

## 2019-05-31 NOTE — CARE PLAN
Problem: Safety  Goal: Will remain free from injury    Intervention: Provide assistance with mobility  Pt assisted during rolls to the side for bed pan placement and removal. The pt is able to turn most of her weight in bed and needs minimal assistance for rolling.       Problem: Pain Management  Goal: Pain level will decrease to patient's comfort goal    Intervention: Follow pain managment plan developed in collaboration with patient and Interdisciplinary Team  Pain management plan followed throughout shift pt does have sever hip pain.

## 2019-05-31 NOTE — PROGRESS NOTES
Hospital Medicine Daily Progress Note    Date of Service  5/31/2019    Chief Complaint  Persistent abscess    Hospital Course   Ms. Martini is a 68-year-old female who was transferred from [facility] on 5/29/2019 with persistent abscesses.  She initially presented with pelvic pain, low back pain, and confusion. She also had new slurred speech. Imaging of the abdomen, pelvis and brain revealed abscesses and she was treated with a full course of IV antibiotics per ID.  Her slurred speech improved and she was able to ambulate with physical therapy. However, repeat imaging showed persistent pelvic abscesses and new brain enhancement. Cultures remained negative and a CHAS done by Dr. Potter showed no vegetations. The size of the pelvic abscesses was reviewed by interventional radiology and the case was discussed with Dr. Finley who recommended transfer to [facility] for CT guided drainage.       Interval Problem Update  5/31:  S/P pelvic abscess drainage yesterday.  Complaining of pain to the site.  Procedural site with no evidence of infection and dressing C/D/I.  Patient otherwise has no complaints.  She is afebrile with stable vital signs.      Consultants/Specialty  Interventional radiology  ID    Code Status  DNAR/DNI    Disposition  Clinical for now.  Anticipate transfer to [facility] when medically clear.  She has been accepted.    Review of Systems  Review of Systems   Constitutional: Negative for fever.   HENT: Negative for sore throat.    Eyes: Negative for blurred vision and double vision.   Respiratory: Negative for cough and shortness of breath.    Cardiovascular: Negative for chest pain, palpitations and leg swelling.   Gastrointestinal: Positive for abdominal pain and diarrhea. Negative for nausea.   Genitourinary: Negative for dysuria and urgency.   Musculoskeletal: Positive for myalgias.        Left hip/thigh pain   Skin: Negative for itching and rash.   Neurological: Positive for weakness. Negative for  dizziness, tingling, tremors, sensory change, speech change, focal weakness and headaches.   Endo/Heme/Allergies: Does not bruise/bleed easily.   Psychiatric/Behavioral: Negative for depression. The patient is not nervous/anxious and does not have insomnia.       Physical Exam  Temp:  [36.3 °C (97.4 °F)-37 °C (98.6 °F)] 36.8 °C (98.2 °F)  Pulse:  [60-86] 60  Resp:  [12-18] 16  BP: (104-143)/(54-70) 135/54  SpO2:  [90 %-97 %] 92 %    Physical Exam   Constitutional: She is oriented to person, place, and time. She appears well-developed and well-nourished. She is active and cooperative. She does not appear ill.   HENT:   Head: Normocephalic and atraumatic.   Right Ear: External ear normal.   Left Ear: External ear normal.   Mouth/Throat: No oropharyngeal exudate.   Eyes: Conjunctivae are normal. Right eye exhibits no discharge. Left eye exhibits no discharge.   Neck: Normal range of motion. Neck supple. No tracheal deviation present.   Cardiovascular: Normal rate, regular rhythm, normal heart sounds and intact distal pulses.    No murmur heard.  Pulmonary/Chest: Effort normal and breath sounds normal. No accessory muscle usage or stridor. No tachypnea. No respiratory distress. She has no decreased breath sounds. She has no wheezes. She has no rhonchi.   Abdominal: Soft. Bowel sounds are normal. She exhibits no distension. There is no tenderness (very mild tenderness in her epigastric region). There is no rigidity.   Musculoskeletal: Normal range of motion. She exhibits no edema or deformity.   Neurological: She is alert and oriented to person, place, and time. She has normal strength. No sensory deficit. GCS eye subscore is 4. GCS verbal subscore is 5. GCS motor subscore is 6.   Skin: Skin is warm and dry. She is not diaphoretic. No erythema.   Psychiatric: She has a normal mood and affect. Her speech is normal. Cognition and memory are normal.   Nursing note and vitals reviewed.    Fluids    Intake/Output Summary  (Last 24 hours) at 05/31/19 1332  Last data filed at 05/31/19 0900   Gross per 24 hour   Intake              960 ml   Output                0 ml   Net              960 ml     Laboratory  Recent Labs      05/30/19   1005  05/31/19   0420   WBC  4.6*  4.7*   RBC  4.65  4.54   HEMOGLOBIN  12.9  12.5   HEMATOCRIT  39.5  38.2   MCV  84.9  84.1   MCH  27.7  27.5   MCHC  32.7*  32.7*   RDW  61.2*  59.4*   PLATELETCT  252  247   MPV  10.5  10.3     Recent Labs      05/30/19   1005  05/31/19   0420   SODIUM  132*  134*   POTASSIUM  4.1  3.8   CHLORIDE  98  100   CO2  25  27   GLUCOSE  88  85   BUN  22  20   CREATININE  0.57  0.54   CALCIUM  10.6*  10.0         Imaging  CT-DRAIN-HEMATOMA - SEROMA   Final Result      CT-guided RIGHT pelvic fluid collection aspiration, laboratory evaluation pending            Assessment/Plan  * Abscess of right iliac muscle and right gluteus medius muscle- (present on admission)   Assessment & Plan    S/P CT guided abscess drainage on 5/30  Removed 2ml  Culture ordered.   ID following.     Hypomagnesemia- (present on admission)   Assessment & Plan    Repleted  Follow labs.     Hypercalcemia- (present on admission)   Assessment & Plan    Chronic. Continue sensipar.      Essential hypertension- (present on admission)   Assessment & Plan    Well controlled on current regimen. Continue home losartan, metoprolol, & amlodipine.      Chronic hyponatremia- (present on admission)   Assessment & Plan    At baseline, continue to monitor.      History of breast cancer- (present on admission)   Assessment & Plan    S/p left mastectomy. Breast implant present there now.      COPD (chronic obstructive pulmonary disease) (HCC)- (present on admission)   Assessment & Plan    Stable. Doing well on room air. No respiratory distress/SOB. Lungs clear throughout. No evidence of exacerbation but will continue to monitor.         VTE prophylaxis: Lovenox

## 2019-05-31 NOTE — DISCHARGE PLANNING
Received Choice form at 1050  Agency/Facility Name: [facility]  Referral sent per Choice form at 1059

## 2019-05-31 NOTE — PROGRESS NOTES
Infectious Disease Progress Note    Author: Beatriz Johnson M.D. Date & Time of service: 5/31/2019  11:47 AM    Chief Complaint:  Brain abscess, iliopsoas abscess, leukopenia      Interval History:  68 y.o. female admitted 5/29/2019 as a transfer from [facility].  She has bben there since 4/30/2019. + diabetes, SANTOSH of right hip with hardware, and breast cancer who was originally admitted to [facility] on 03/08/2019 for right hip and pelvic pain.  Work-up revealed a right iliopsoas lesion, gluteal abscess, and mult brain abscesses  5/6 Interval 24 hours of Vitals/Labs/Micro,and imaging results reviewed as available. See assessment.   5/10 AF WBC 3 continued c/o constipation denies HA, visual changes, neuro deficits  5/11 AF WBC 8.8 isolation stopped due to resolution neutropenia-sleepy today  5/12 AF no CBC somnolent-no acute events noted  5/13 AF WBC 6.2 c/o pain left hip today, unchanged Worse with movement and improved with ice. Refused PT yesterday   5/14 AF c/o dizzyness whenever she tirns on her side-no new HA or visual changes-still havng hip pain  5/15 AF sleeping at time of rounds-by report ambulating  5/16 AF weightbearing continues to improve-ambulating more.  No new complaints  5/20- still has some pain in the hip but ambulating without any issues. No fevers. In good spirits.  5/22/2019 continues to have hip pain.  No fevers have been noted  5/24 AF no CBC plan for CHAS today. Denies any headaches. States she is ambulating  5/25 afebrile CBC not done today.  Patient CHAS was negative.  She has a poor appetite and is requesting an appetite stimulant.  Ongoing left hip pain.  Plan for CT-guided drainage tomorrow  5/28 afebrile WBC 4.5.  Patient is resting comfortably.  She states that she has right breast pain.  Ultrasound of the breast otherwise unremarkable.  She is awaiting CT-guided drainage which has been delayed as CT scan malfunctioning yesterday.  5/29 afebrile, no CBC.  Patient denies any right hip  pain.  Continues to have left-sided hip pain especially with sitting. She had a repeat CT of the pelvis yesterday that revealed no changes in the abscess.  Per report, abscesses are too small for IR drainage given location and recommendation to transfer the patient to [facility] for CT-guided aspiration of the fluid for culture.  She continues to deny any fevers or chills.   AF c/o hungry and right hip pain Awaiting procedure. Denies SE abx  2019-no fevers.  Complains of pain in the hips.  Underwent aspiration of the pelvic abscess.  The cultures are negative for  Labs Reviewed, Medications Reviewed, Radiology Reviewed and Wound Reviewed.    Review of Systems:  Review of Systems   Constitutional: Negative for chills and fever.   Gastrointestinal: Negative for nausea and vomiting.   Musculoskeletal: Positive for joint pain.   All other systems reviewed and are negative.      Hemodynamics:  Temp (24hrs), Av.7 °C (98.1 °F), Min:36.3 °C (97.4 °F), Max:37 °C (98.6 °F)  Temperature: 36.8 °C (98.2 °F)  Pulse  Av.2  Min: 60  Max: 86Heart Rate (Monitored): 82  Blood Pressure : 135/54       Physical Exam:  Physical Exam   Constitutional: She is oriented to person, place, and time. No distress.   HENT:   Mouth/Throat: No oropharyngeal exudate.   Eyes: Pupils are equal, round, and reactive to light. EOM are normal.   Neck: Neck supple.   Cardiovascular: Normal rate.    Pulmonary/Chest: Effort normal. No respiratory distress.   Abdominal: Soft. She exhibits no distension.   Musculoskeletal: She exhibits tenderness. She exhibits no edema.   Neurological: She is alert and oriented to person, place, and time.   Skin: Skin is warm. She is not diaphoretic.   Nursing note and vitals reviewed.      Meds:    Current Facility-Administered Medications:   •  magnesium sulfate  •  Respiratory Care per Protocol  •  oxyCODONE immediate-release  •  amLODIPine  •  cinacalcet  •  losartan  •  acetaminophen  •   atorvastatin  •  omeprazole  •  metoprolol  •  enoxaparin (LOVENOX) injection  •  senna-docusate **AND** polyethylene glycol/lytes **AND** magnesium hydroxide **AND** bisacodyl  •  ondansetron  •  ondansetron    Labs:  Recent Labs      05/30/19   1005  05/31/19   0420   WBC  4.6*  4.7*   RBC  4.65  4.54   HEMOGLOBIN  12.9  12.5   HEMATOCRIT  39.5  38.2   MCV  84.9  84.1   MCH  27.7  27.5   RDW  61.2*  59.4*   PLATELETCT  252  247   MPV  10.5  10.3   NEUTSPOLYS  59.50  55.30   LYMPHOCYTES  19.30*  19.40*   MONOCYTES  13.20  14.90*   EOSINOPHILS  5.10  7.70*   BASOPHILS  2.00*  2.30*     Recent Labs      05/30/19   1005  05/31/19   0420   SODIUM  132*  134*   POTASSIUM  4.1  3.8   CHLORIDE  98  100   CO2  25  27   GLUCOSE  88  85   BUN  22  20     Recent Labs      05/30/19   1005  05/31/19   0420   ALBUMIN  3.6   --    TBILIRUBIN  0.5   --    ALKPHOSPHAT  229*   --    TOTPROTEIN  7.3   --    ALTSGPT  29   --    ASTSGOT  43   --    CREATININE  0.57  0.54       Imaging:  Ct-pelvis With    Result Date: 5/28/2019 5/28/2019 12:54 PM HISTORY/REASON FOR EXAM: Follow-up abscess TECHNIQUE/EXAM DESCRIPTION AND NUMBER OF VIEWS: CT scan of the pelvis with contrast. Contrast-enhanced helical scanning of the pelvis was obtained from the iliac crests through the pubic symphysis following the bolus administration of 100 mL of Omnipaque 350 nonionic contrast without complication. Low dose optimization technique was utilized for this CT exam including automated exposure control and adjustment of the mA and/or kV according to patient size. COMPARISON: MRI scan of the pelvis 5/20/2019 and CT chest abdomen and pelvis 4/23/2019 FINDINGS: Again redemonstrated is a 3.2 x 2 cm multiloculated low-attenuation collection in the right iliacus muscle unchanged from recent MRI scan of the pelvis. There is also a 2.8 x 1.7 cm low-attenuation collection in the right gluteus medius muscle. This collection is also unchanged from recent MRI scan of the  pelvis. Previous fixation screws are noted coursing through the right lateral ilium across the sacrum extending into the left lateral ilium. There is a healing fracture of the right posterior ilium. There is no evidence of free fluid in the pelvis or pelvic mass. There are scattered air-fluid levels throughout the small bowel     1.  Stable abscesses again redemonstrated in the right iliac's muscle and right gluteus medius muscle. 2.  Status post fixation screws coursing through the jose antonio and sacrum for treatment of healing fracture in the right posterior ilium. 3.  Adynamic ileus.    Dx-chest-portable (1 View)    Result Date: 5/21/2019 5/21/2019 2:30 PM HISTORY/REASON FOR EXAM:  Right-sided neck pain. TECHNIQUE/EXAM DESCRIPTION AND NUMBER OF VIEWS: Single portable view of the chest. COMPARISON: 4/30/2019 FINDINGS: Single portable view of the chest demonstrates a normal cardiac silhouette and mediastinal contours. Calcification is in the aortic knob. No discrete opacity, pleural fluid, or pneumothorax. A right PICC line remains in place. The tip appears to be located at the cavoatrial junction.     1.  No acute cardiopulmonary findings. 2.  Right PICC line place with the tip projecting over the cavoatrial junction    Dx-chest-portable (1 View)    Result Date: 4/30/2019 4/30/2019 4:19 PM HISTORY/REASON FOR EXAM:  PICC line placement. IV access necessity. TECHNIQUE/EXAM DESCRIPTION AND NUMBER OF VIEWS: Single portable view of the chest. COMPARISON:  4/22/2019 FINDINGS: A right arm PICC line is present in satisfactory position with its tip projecting over the SVC at the level of the jennifer. The cardiac silhouette is within normal limits. No infiltrates or consolidations are noted. No pleural effusion is noted.     1.  Right arm PICC line tip projects in satisfactory position. 2.  Clear chest.    Mr-brain-with & W/o    Result Date: 5/21/2019 5/20/2019 3:37 PM HISTORY/REASON FOR EXAM:  Follow-up brain abscesses.  TECHNIQUE/EXAM DESCRIPTION:   MRI of the brain without and with contrast. T1 sagittal, T2 fast spin-echo axial, T1 coronal, FLAIR coronal, diffusion-weighted and apparent diffusion coefficient (ADC map) axial images were obtained of the whole brain. T1 postcontrast axial and T1 postcontrast coronal images were obtained. The study was performed on a [employer] Signa 1.5 Miranda MRI scanner. 6 mL Gadavist contrast was administered intravenously. COMPARISON:  MRI scan of the brain 4/24/2019 FINDINGS: There are innumerable small ovoid enhancing foci throughout the brain parenchyma, both in the supratentorial and infratentorial compartments. The number of these lesions has increased significantly since previous exam. The previously identified largest lesion in the right brachium pontis has resolved. There is evidence of increased T2 signal intensity in many of these lesions and there is surrounding increased T2 signal intensity in several lesions in the right superior frontal lobe. The calvariae are unremarkable. There are no extra-axial fluid collections. The ventricular system and basal cisterns are mild to moderately prominent. There is mild-to-moderate prominence of the cortical sulci and gyri. There are no mass effects or shift of midline structures. There are no hemorrhagic lesions. The diffusion-weighted axial images show no evidence of acute cerebral infarction. The brainstem and posterior fossa structures are unremarkable. Vascular flow voids in the vertebrobasilar and carotid arteries, [place], and dural venous sinuses are intact. The paranasal sinuses and mastoids in the field of view are unremarkable.     1.  Increase in number of innumerable small ovoid enhancing lesions throughout the brain parenchyma. These lesions may represent microabscesses of either bacterial or fungal origin or possibly brain metastases. 2.  Age-related cerebral atrophy.    Mr-pelvis-with & W/o And Sequences    Result Date:  5/21/2019 5/20/2019 3:37 PM HISTORY/REASON FOR EXAM:  Abd pain, fever, abscess suspected; evaluate abscesses TECHNIQUE/EXAM DESCRIPTION: MRI of the pelvis without and with contrast. The study was performed on a [employer] Signa 1.5 Miranda MRI scanner. T1 axial, T1 coronal, T2 fast spin-echo fat-suppressed axial, and T2 fast spin-echo fat-suppressed coronal images were obtained of the pelvis. Postcontrast fat-suppressed intravenous gadolinium enhanced sequences in the axial and coronal planes were then  performed. 6 mL Gadavist contrast was administered intravenously. COMPARISON: 3/26/2019. CT 4/23/2019 FINDINGS: Interval decrease in size of the collection in the right gluteal medius muscle, measuring 1.4 x 1.9 cm, previously 2.3 x 2.8 cm. There is minimal surrounding stranding. Mild heterogeneity and stranding in the overlying right gluteus cornelio muscle without discrete collection. Grossly unchanged in size of the multiloculated collection in the right iliacus muscle, measuring 2.4 x 3.5 cm. Postsurgical change in the sacrum with susceptibility artifact from the screws. Moderate osteoarthritis of the bilateral hip joints.     1. Interval decrease in size of the collection in the right gluteal medius muscle, measuring 1.4 x 1.9 cm, previously 2.3 x 2.8 cm. There is minimal surrounding stranding. Mild heterogeneity and stranding in the overlying right gluteus cornelio muscle without discrete collection, could relate to reactive change or phlegmon. 2. Grossly unchanged in size of the multiloculated collection in the right iliacus muscle, measuring 2.4 x 3.5 cm.    Us-chest    Result Date: 5/27/2019 5/27/2019 10:23 AM HISTORY/REASON FOR EXAM:  Right lateral breast pain, evaluate implant TECHNIQUE/EXAM DESCRIPTION AND NUMBER OF VIEWS: Targeted ultrasound of the right breast. COMPARISON: None FINDINGS: No gross mass or implant rupture are identified. Diagnostic assessment is not feasible lack of proper equipment tube  performed breast imaging     1.  Nondiagnostic assessment of the right breast without gross implant rupture identified 2.  Recommend further assessment with mammography and ultrasound at an accredited breast facility    Ec-halie W/o Cont    Result Date: 2019  Transesophageal Echo Report Echocardiography Laboratory CONCLUSIONS Normal esophageal echocardiogram. No evidence of endocarditis. No significant change since the prior study of 3/15/2019. MARLENA CLIFFORD Exam Date:          2019                     12:48 Exam Location:      Inpatient Priority:            Routine Ordering Physician:        DARON WHELAN Referring Physician: Sonographer:               CATALINO Rivers Age:    68     Gender:    F MRN:    [id number] :    [id number] BSA:           Ht (in):           Wt (lb): Report Type:      Complete Indications:     Endocarditis and heart valve disorders in diseases                  classified elsewhere ICD Codes:       I39 CPT Codes:       50829 BP:          /          HR: Technical Quality:       Fair MEASUREMENTS  (Male / Female) Normal Values 2D ECHO Estimated LV Ejection Fraction    65 %                  * Indicates values subject to auto-interpretation LV EF:  65    % Medications Medications determined by anesthesiologist. Limitations none Complications none Proc. Components The probe was inserted and manipulated by Dr Whelan. Probe #SM  was used for this procedure. 2D, color Doppler, spectral Doppler, and 3D imaging were used as part of the evaluation as clinically indicated. FINDINGS Left Ventricle The left ventricle was normal in size and function. Right Ventricle The right ventricle was normal in size and function. Right Atrium The right atrium is normal in size. Left Atrium The left atrium is normal in size. LA Appendage Normal left atrial appendage. IA Septum The interatrial septum is normal. IV Septum The interventricular  "septum is normal. Mitral Valve Structurally normal mitral valve without significant stenosis or regurgitation.  No valvular vegetations. Aortic Valve Structurally normal aortic valve without significant stenosis or regurgitation.  No valvular vegetations. Tricuspid Valve Structurally normal tricuspid valve without significant stenosis or regurgitation.  No valvular vegetations. Pulmonic Valve Structurally normal pulmonic valve without significant stenosis or regurgitation.  No valvular vegetations. Pericardium Normal pericardium without effusion. Aorta The aortic root is normal. Christopher Potter MD (Electronically Signed) Final Date:     24 May 2019 15:42      Micro:  Results     Procedure Component Value Units Date/Time    FLUID CULTURE W/GRAM STAIN [[id number]] Collected:  05/30/19 1339    Order Status:  Completed Specimen:  Body Fluid Updated:  05/31/19 1055     Significant Indicator NEG     Source BF     Site pelvic abcess     Culture Result No growth at 24 hours.     Gram Stain Result Few WBCs.  No organisms seen.      GRAM STAIN [[id number]] Collected:  05/30/19 1339    Order Status:  Completed Specimen:  Body Fluid Updated:  05/30/19 1832     Significant Indicator .     Source BF     Site pelvic abcess     Gram Stain Result Few WBCs.  No organisms seen.      FLUID CULTURE W/GRAM STAIN [[id number]]     Order Status:  No result Specimen:  Body Fluid from Other Body Fluid     FLUID CULTURE [[id number]]     Order Status:  No result Specimen:  Body Fluid from Other Body Fluid           Assessment:  Gluteal and iliopsoas abscess  Brain abscesses vs mets  Breast cancer  DM      Plan:  Gluteal and iliopsoas abscess s/p treatment.  Additional work-up ongoing  Afebrile  No leukocytosis  Adjacent to hardware in right hip (placed 12/17/18)  CT 4/23 \"Fluid collections within the right gluteal and iliacis muscles.. The collection within the right gluteal muscle has unchanged while the fluid collection within the right " "iliacis muscle is increased somewhat in size.\" 2.7 cm  Cultures 3/29 and 4/4 neg  MRI on 5/20/2019 - interval decrease in collection in R gluteal muscle and persistent multiloculated collection in R iliacus muscle.   CT 5/28 no change  Underwent drainage on 5/30/2019-cultures are negative  Ortho evaluation for removal of hardware     Brain abscesses vs mets  Afebrile  MRI 4/23 \"supra and infratentorial brain parenchyma... interval decrease in the size of the lesions. There are also interval reduction in the extent of the surrounding white matter edema in the restricted diffusion consistent with improvement/treatment response of the most of the multifocal brain abscesses.  Repeat MRI 5/21 showed innumerable microabscesses vs mets  Mult blood cultures neg  CHAS neg 3/15 and 5/24  No benefit of PET scan per notes  Abx (vancomycin, cefepime and Flagyl) discontinued on 5/23  Monitor neuro status closely     History of pelvic fracture  Status post pin placement and sacral  Eventually will need removal     Type 2 DM  Hemoglobin A1c 6.3   Keep BS under 150 to help control current infection      I have performed a physical exam and reviewed and updated ROS and plan today 5/31/2019.  In review of yesterday's note 5/30/2019, there are no changes except as documented above.    Discussed with internal medicine.  "

## 2019-06-01 LAB
CRP SERPL HS-MCNC: 0.98 MG/DL (ref 0–0.75)
ERYTHROCYTE [SEDIMENTATION RATE] IN BLOOD BY WESTERGREN METHOD: 32 MM/HOUR (ref 0–30)

## 2019-06-01 PROCEDURE — A9270 NON-COVERED ITEM OR SERVICE: HCPCS | Performed by: HOSPITALIST

## 2019-06-01 PROCEDURE — 700111 HCHG RX REV CODE 636 W/ 250 OVERRIDE (IP): Performed by: NURSE PRACTITIONER

## 2019-06-01 PROCEDURE — 700101 HCHG RX REV CODE 250: Performed by: NURSE PRACTITIONER

## 2019-06-01 PROCEDURE — 700102 HCHG RX REV CODE 250 W/ 637 OVERRIDE(OP): Performed by: NURSE PRACTITIONER

## 2019-06-01 PROCEDURE — 700102 HCHG RX REV CODE 250 W/ 637 OVERRIDE(OP): Performed by: HOSPITALIST

## 2019-06-01 PROCEDURE — A9270 NON-COVERED ITEM OR SERVICE: HCPCS | Performed by: NURSE PRACTITIONER

## 2019-06-01 PROCEDURE — 770006 HCHG ROOM/CARE - MED/SURG/GYN SEMI*

## 2019-06-01 PROCEDURE — 700102 HCHG RX REV CODE 250 W/ 637 OVERRIDE(OP): Performed by: INTERNAL MEDICINE

## 2019-06-01 PROCEDURE — 86140 C-REACTIVE PROTEIN: CPT

## 2019-06-01 PROCEDURE — 85652 RBC SED RATE AUTOMATED: CPT

## 2019-06-01 PROCEDURE — 99232 SBSQ HOSP IP/OBS MODERATE 35: CPT | Performed by: INTERNAL MEDICINE

## 2019-06-01 PROCEDURE — 700111 HCHG RX REV CODE 636 W/ 250 OVERRIDE (IP): Performed by: HOSPITALIST

## 2019-06-01 PROCEDURE — A9270 NON-COVERED ITEM OR SERVICE: HCPCS | Performed by: INTERNAL MEDICINE

## 2019-06-01 PROCEDURE — 700111 HCHG RX REV CODE 636 W/ 250 OVERRIDE (IP): Performed by: INTERNAL MEDICINE

## 2019-06-01 RX ORDER — OXYCODONE HYDROCHLORIDE 10 MG/1
10 TABLET ORAL EVERY 4 HOURS PRN
Status: DISCONTINUED | OUTPATIENT
Start: 2019-06-01 | End: 2019-06-11

## 2019-06-01 RX ORDER — MORPHINE SULFATE 4 MG/ML
2-4 INJECTION, SOLUTION INTRAMUSCULAR; INTRAVENOUS ONCE
Status: COMPLETED | OUTPATIENT
Start: 2019-06-01 | End: 2019-06-01

## 2019-06-01 RX ORDER — CYCLOBENZAPRINE HCL 10 MG
10 TABLET ORAL 3 TIMES DAILY PRN
Status: DISCONTINUED | OUTPATIENT
Start: 2019-06-01 | End: 2019-08-10 | Stop reason: HOSPADM

## 2019-06-01 RX ORDER — TEMAZEPAM 15 MG/1
15 CAPSULE ORAL
Status: DISCONTINUED | OUTPATIENT
Start: 2019-06-01 | End: 2019-07-18

## 2019-06-01 RX ORDER — LIDOCAINE 50 MG/G
2 PATCH TOPICAL EVERY 24 HOURS
Status: DISCONTINUED | OUTPATIENT
Start: 2019-06-01 | End: 2019-08-10 | Stop reason: HOSPADM

## 2019-06-01 RX ORDER — MORPHINE SULFATE 4 MG/ML
4 INJECTION, SOLUTION INTRAMUSCULAR; INTRAVENOUS
Status: DISCONTINUED | OUTPATIENT
Start: 2019-06-01 | End: 2019-06-11

## 2019-06-01 RX ORDER — MORPHINE SULFATE 4 MG/ML
2 INJECTION, SOLUTION INTRAMUSCULAR; INTRAVENOUS EVERY 4 HOURS PRN
Status: DISCONTINUED | OUTPATIENT
Start: 2019-06-01 | End: 2019-06-01

## 2019-06-01 RX ORDER — PRAMIPEXOLE DIHYDROCHLORIDE 0.5 MG/1
1 TABLET ORAL
Status: DISCONTINUED | OUTPATIENT
Start: 2019-06-01 | End: 2019-06-02

## 2019-06-01 RX ADMIN — CINACALCET HYDROCHLORIDE 60 MG: 30 TABLET, COATED ORAL at 04:20

## 2019-06-01 RX ADMIN — ENOXAPARIN SODIUM 40 MG: 100 INJECTION SUBCUTANEOUS at 04:21

## 2019-06-01 RX ADMIN — RIVAROXABAN 15 MG: 15 TABLET, FILM COATED ORAL at 16:23

## 2019-06-01 RX ADMIN — CYCLOBENZAPRINE 10 MG: 10 TABLET, FILM COATED ORAL at 12:35

## 2019-06-01 RX ADMIN — ATORVASTATIN CALCIUM 10 MG: 10 TABLET, FILM COATED ORAL at 16:23

## 2019-06-01 RX ADMIN — MORPHINE SULFATE 2 MG: 4 INJECTION INTRAVENOUS at 09:23

## 2019-06-01 RX ADMIN — MORPHINE SULFATE 3 MG: 4 INJECTION INTRAVENOUS at 04:16

## 2019-06-01 RX ADMIN — METOPROLOL TARTRATE 25 MG: 25 TABLET, FILM COATED ORAL at 04:21

## 2019-06-01 RX ADMIN — METOPROLOL TARTRATE 25 MG: 25 TABLET, FILM COATED ORAL at 16:23

## 2019-06-01 RX ADMIN — LOSARTAN POTASSIUM 50 MG: 50 TABLET ORAL at 04:21

## 2019-06-01 RX ADMIN — TEMAZEPAM 15 MG: 15 CAPSULE ORAL at 20:09

## 2019-06-01 RX ADMIN — ACETAMINOPHEN 650 MG: 325 TABLET, FILM COATED ORAL at 20:09

## 2019-06-01 RX ADMIN — OMEPRAZOLE 20 MG: 20 CAPSULE, DELAYED RELEASE ORAL at 04:20

## 2019-06-01 RX ADMIN — LIDOCAINE 2 PATCH: 50 PATCH CUTANEOUS at 12:35

## 2019-06-01 RX ADMIN — OXYCODONE HYDROCHLORIDE 10 MG: 10 TABLET ORAL at 16:23

## 2019-06-01 RX ADMIN — AMLODIPINE BESYLATE 10 MG: 5 TABLET ORAL at 04:21

## 2019-06-01 RX ADMIN — SENNOSIDES AND DOCUSATE SODIUM 2 TABLET: 8.6; 5 TABLET ORAL at 16:23

## 2019-06-01 RX ADMIN — OXYCODONE HYDROCHLORIDE 5 MG: 5 TABLET ORAL at 08:37

## 2019-06-01 RX ADMIN — OXYCODONE HYDROCHLORIDE 5 MG: 5 TABLET ORAL at 02:44

## 2019-06-01 RX ADMIN — MORPHINE SULFATE 4 MG: 4 INJECTION INTRAVENOUS at 14:44

## 2019-06-01 RX ADMIN — PRAMIPEXOLE DIHYDROCHLORIDE 1 MG: 0.5 TABLET ORAL at 20:10

## 2019-06-01 ASSESSMENT — ENCOUNTER SYMPTOMS
DEPRESSION: 0
FEVER: 0
TREMORS: 0
BLURRED VISION: 0
ABDOMINAL PAIN: 0
SHORTNESS OF BREATH: 0
DIARRHEA: 0
VOMITING: 0
NERVOUS/ANXIOUS: 1
SENSORY CHANGE: 0
WEAKNESS: 1
INSOMNIA: 0
DIZZINESS: 0
TINGLING: 0
SORE THROAT: 0
NAUSEA: 0
SPEECH CHANGE: 0
MYALGIAS: 1
CHILLS: 0
HEADACHES: 0
BRUISES/BLEEDS EASILY: 0

## 2019-06-01 NOTE — PROGRESS NOTES
Assumed care of pt, discussed plan of care. A&Ox4. RA, tolerates well. RLL, LLL diminished. Complains of 8/10 pain; repositioned. Last BM, 05/30. Continent of bowel, bladder. Assist x1. On diabetic diet. PICC to RUE; running NS TKO. Fall precautions in place; bed lowest position, treaded socks at bedside, call light within reach, bed alarm on. All needs met at at this time.

## 2019-06-01 NOTE — CARE PLAN
Problem: Communication  Goal: The ability to communicate needs accurately and effectively will improve    Intervention: Educate patient and significant other/support system about the plan of care, procedures, treatments, medications and allow for questions  Educated pt on plan of care, scheduled/prn medications. Plan for d/c once medically clear. Pt aware and acknowledges understanding. Pt able to make needs known.      Problem: Safety  Goal: Will remain free from falls    Intervention: Implement fall precautions  Pt moderate fall risk. Educated pt on fall risk and use of call light for fall prevention. Pt acknowledges understanding and calls appropriately. Fall precautions in place; bed lowest position, treaded socks at bedside, call light within reach, bed alarm on.

## 2019-06-01 NOTE — PROGRESS NOTES
Hospital Medicine Daily Progress Note    Date of Service  6/1/2019    Chief Complaint  Persistent abscess    Hospital Course   Ms. Martini is a 68-year-old female who was transferred from [facility] on 5/29/2019 with persistent abscesses.  She initially presented with pelvic pain, low back pain, and confusion. She also had new slurred speech. Imaging of the abdomen, pelvis and brain revealed abscesses and she was treated with a full course of IV antibiotics per ID.  Her slurred speech improved and she was able to ambulate with physical therapy. However, repeat imaging showed persistent pelvic abscesses and new brain enhancement. Cultures remained negative and a CHAS done by Dr. Potter showed no vegetations. The size of the pelvic abscesses was reviewed by interventional radiology and the case was discussed with Dr. Finley who recommended transfer to [facility] for CT guided drainage.       Interval Problem Update  5/31:  S/P pelvic abscess drainage yesterday.  Complaining of pain to the site.  Procedural site with no evidence of infection and dressing C/D/I.  Patient otherwise has no complaints.  She is afebrile with stable vital signs.    6/1:  Complaining of significant pian to left hip.  This is a chronic issue, but currently uncontrolled.  Pain regimen adjusted.  Reports she has had difficultly sleeping the past 2 nights due to discomfort.  Sleep aid provided.    Consultants/Specialty  Interventional radiology  ID    Code Status  DNAR/DNI    Disposition  Clinical for now.  Anticipate transfer to [facility] when medically clear.  She has been accepted.     Review of Systems  Review of Systems   Constitutional: Negative for chills, fever and malaise/fatigue.   HENT: Negative for congestion and sore throat.    Eyes: Negative for blurred vision.   Respiratory: Negative for shortness of breath.    Cardiovascular: Negative for chest pain and leg swelling.   Gastrointestinal: Negative for abdominal pain, diarrhea, nausea and  vomiting.   Genitourinary: Negative for dysuria.   Musculoskeletal: Positive for joint pain and myalgias.        Left hip/thigh pain   Skin: Negative for rash.   Neurological: Positive for weakness. Negative for dizziness, tingling, tremors, sensory change, speech change and headaches.   Endo/Heme/Allergies: Does not bruise/bleed easily.   Psychiatric/Behavioral: Negative for depression. The patient is nervous/anxious. The patient does not have insomnia.       Physical Exam  Temp:  [36.6 °C (97.8 °F)-37.5 °C (99.5 °F)] 36.6 °C (97.8 °F)  Pulse:  [63-83] 77  Resp:  [15-18] 16  BP: (125-136)/(48-67) 125/48  SpO2:  [91 %-99 %] 99 %    Physical Exam   Constitutional: She is oriented to person, place, and time. She appears well-developed and well-nourished. She is active and cooperative. She does not appear ill. No distress.   HENT:   Head: Normocephalic and atraumatic.   Eyes: Conjunctivae are normal. No scleral icterus.   Neck: Normal range of motion. Neck supple. No JVD present.   Cardiovascular: Normal rate, regular rhythm, normal heart sounds and intact distal pulses.  Exam reveals no friction rub.    No murmur heard.  Pulmonary/Chest: Effort normal and breath sounds normal. No accessory muscle usage or stridor. No tachypnea. No respiratory distress. She has no decreased breath sounds. She has no wheezes. She has no rhonchi. She has no rales.   Abdominal: Soft. Bowel sounds are normal. She exhibits no distension. There is no tenderness (very mild tenderness in her epigastric region). There is no rigidity and no rebound.   Musculoskeletal: Normal range of motion. She exhibits no edema.   Neurological: She is alert and oriented to person, place, and time. She has normal strength. No sensory deficit. GCS eye subscore is 4. GCS verbal subscore is 5. GCS motor subscore is 6.   Skin: Skin is warm and dry. No rash noted. She is not diaphoretic. No erythema.   Psychiatric: Her speech is normal. Her mood appears anxious.  Cognition and memory are normal.   Nursing note and vitals reviewed.    Fluids    Intake/Output Summary (Last 24 hours) at 06/01/19 1158  Last data filed at 06/01/19 0923   Gross per 24 hour   Intake              980 ml   Output                0 ml   Net              980 ml     Laboratory  Recent Labs      05/30/19   1005  05/31/19   0420   WBC  4.6*  4.7*   RBC  4.65  4.54   HEMOGLOBIN  12.9  12.5   HEMATOCRIT  39.5  38.2   MCV  84.9  84.1   MCH  27.7  27.5   MCHC  32.7*  32.7*   RDW  61.2*  59.4*   PLATELETCT  252  247   MPV  10.5  10.3     Recent Labs      05/30/19   1005  05/31/19   0420   SODIUM  132*  134*   POTASSIUM  4.1  3.8   CHLORIDE  98  100   CO2  25  27   GLUCOSE  88  85   BUN  22  20   CREATININE  0.57  0.54   CALCIUM  10.6*  10.0         Imaging  CT-DRAIN-HEMATOMA - SEROMA   Final Result      CT-guided RIGHT pelvic fluid collection aspiration, laboratory evaluation pending            Assessment/Plan  * Abscess of right iliac muscle and right gluteus medius muscle- (present on admission)   Assessment & Plan    S/P CT guided abscess drainage on 5/30  Removed 2ml  Culture negative.   No need for antibiotics at this time.  ID following.  Will discuss hardware removal from prior right sacral fx repair with Dr. Porter.     Hypomagnesemia- (present on admission)   Assessment & Plan    Repleted  Follow labs.     Hypercalcemia- (present on admission)   Assessment & Plan    Chronic. Continue sensipar.      Essential hypertension- (present on admission)   Assessment & Plan    Well controlled on current regimen. Continue home losartan, metoprolol, & amlodipine.      Chronic hyponatremia- (present on admission)   Assessment & Plan    At baseline, continue to monitor.      Chronic pain- (present on admission)   Assessment & Plan    Uncontrolled.  Complaining of significant left hip pain  Increased PRN oxycodone.  Morphine for breakthrough  Lidocaine patches ordered.     History of breast cancer- (present on  admission)   Assessment & Plan    S/p left mastectomy. Breast implant present there now.      COPD (chronic obstructive pulmonary disease) (HCC)- (present on admission)   Assessment & Plan    Stable. Doing well on room air. No respiratory distress/SOB. Lungs clear throughout. No evidence of exacerbation but will continue to monitor.         VTE prophylaxis: Lovenox

## 2019-06-01 NOTE — PROGRESS NOTES
"Assumed care of pt. A&Ox4. Assessment completed, POC discussed. Pt denies pain or discomfort this AM. States \"I received morphine this morning for my pain.\" Pt's bed soiled, changed linens. Pt agreeable to getting up in the chair for lunch. All needs met at this time. Safety precautions/hourly rounding in place.   "

## 2019-06-01 NOTE — PROGRESS NOTES
Infectious Disease Progress Note    Author: Beatriz Johnson M.D. Date & Time of service: 6/1/2019  2:48 PM    Chief Complaint:  Brain abscess, iliopsoas abscess, leukopenia      Interval History:  68 y.o. female admitted 5/29/2019 as a transfer from [facility].  She has bben there since 4/30/2019. + diabetes, SANTOSH of right hip with hardware, and breast cancer who was originally admitted to [facility] on 03/08/2019 for right hip and pelvic pain.  Work-up revealed a right iliopsoas lesion, gluteal abscess, and mult brain abscesses  5/6 Interval 24 hours of Vitals/Labs/Micro,and imaging results reviewed as available. See assessment.   5/10 AF WBC 3 continued c/o constipation denies HA, visual changes, neuro deficits  5/11 AF WBC 8.8 isolation stopped due to resolution neutropenia-sleepy today  5/12 AF no CBC somnolent-no acute events noted  5/13 AF WBC 6.2 c/o pain left hip today, unchanged Worse with movement and improved with ice. Refused PT yesterday   5/14 AF c/o dizzyness whenever she tirns on her side-no new HA or visual changes-still havng hip pain  5/15 AF sleeping at time of rounds-by report ambulating  5/16 AF weightbearing continues to improve-ambulating more.  No new complaints  5/20- still has some pain in the hip but ambulating without any issues. No fevers. In good spirits.  5/22/2019 continues to have hip pain.  No fevers have been noted  5/24 AF no CBC plan for CHAS today. Denies any headaches. States she is ambulating  5/25 afebrile CBC not done today.  Patient CHAS was negative.  She has a poor appetite and is requesting an appetite stimulant.  Ongoing left hip pain.  Plan for CT-guided drainage tomorrow  5/28 afebrile WBC 4.5.  Patient is resting comfortably.  She states that she has right breast pain.  Ultrasound of the breast otherwise unremarkable.  She is awaiting CT-guided drainage which has been delayed as CT scan malfunctioning yesterday.  5/29 afebrile, no CBC.  Patient denies any right hip  pain.  Continues to have left-sided hip pain especially with sitting. She had a repeat CT of the pelvis yesterday that revealed no changes in the abscess.  Per report, abscesses are too small for IR drainage given location and recommendation to transfer the patient to [facility] for CT-guided aspiration of the fluid for culture.  She continues to deny any fevers or chills.   AF c/o hungry and right hip pain Awaiting procedure. Denies SE abx  2019-no fevers.  Complains of pain in the hips.  Underwent aspiration of the pelvic abscess.  The cultures are negative  2019-no fevers.  Continues to complain of significant pain in her hips.  Pain medications are being adjusted.  Labs Reviewed, Medications Reviewed, Radiology Reviewed and Wound Reviewed.    Review of Systems:  Review of Systems   Constitutional: Negative for chills and fever.   Gastrointestinal: Negative for nausea and vomiting.   Musculoskeletal: Positive for joint pain.   All other systems reviewed and are negative.      Hemodynamics:  Temp (24hrs), Av.7 °C (98.1 °F), Min:36.4 °C (97.6 °F), Max:37.5 °C (99.5 °F)  Temperature: 36.4 °C (97.6 °F)  Pulse  Av.6  Min: 60  Max: 93   Blood Pressure : 135/69       Physical Exam:  Physical Exam   Constitutional: She is oriented to person, place, and time. No distress.   HENT:   Mouth/Throat: No oropharyngeal exudate.   Eyes: Pupils are equal, round, and reactive to light. EOM are normal.   Neck: Neck supple.   Cardiovascular: Normal rate.    Pulmonary/Chest: Effort normal. No respiratory distress.   Abdominal: Soft. She exhibits no distension.   Musculoskeletal: She exhibits tenderness. She exhibits no edema.   Neurological: She is alert and oriented to person, place, and time.   Skin: Skin is warm. She is not diaphoretic.   Nursing note and vitals reviewed.      Meds:    Current Facility-Administered Medications:   •  lidocaine  •  oxyCODONE immediate-release  •  temazepam  •  morphine  injection  •  cyclobenzaprine  •  pramipexole  •  Respiratory Care per Protocol  •  amLODIPine  •  cinacalcet  •  losartan  •  acetaminophen  •  atorvastatin  •  omeprazole  •  metoprolol  •  enoxaparin (LOVENOX) injection  •  senna-docusate **AND** polyethylene glycol/lytes **AND** magnesium hydroxide **AND** bisacodyl  •  ondansetron  •  ondansetron    Labs:  Recent Labs      05/30/19   1005  05/31/19   0420   WBC  4.6*  4.7*   RBC  4.65  4.54   HEMOGLOBIN  12.9  12.5   HEMATOCRIT  39.5  38.2   MCV  84.9  84.1   MCH  27.7  27.5   RDW  61.2*  59.4*   PLATELETCT  252  247   MPV  10.5  10.3   NEUTSPOLYS  59.50  55.30   LYMPHOCYTES  19.30*  19.40*   MONOCYTES  13.20  14.90*   EOSINOPHILS  5.10  7.70*   BASOPHILS  2.00*  2.30*     Recent Labs      05/30/19   1005  05/31/19   0420   SODIUM  132*  134*   POTASSIUM  4.1  3.8   CHLORIDE  98  100   CO2  25  27   GLUCOSE  88  85   BUN  22  20     Recent Labs      05/30/19   1005  05/31/19   0420   ALBUMIN  3.6   --    TBILIRUBIN  0.5   --    ALKPHOSPHAT  229*   --    TOTPROTEIN  7.3   --    ALTSGPT  29   --    ASTSGOT  43   --    CREATININE  0.57  0.54       Imaging:  Ct-pelvis With    Result Date: 5/28/2019 5/28/2019 12:54 PM HISTORY/REASON FOR EXAM: Follow-up abscess TECHNIQUE/EXAM DESCRIPTION AND NUMBER OF VIEWS: CT scan of the pelvis with contrast. Contrast-enhanced helical scanning of the pelvis was obtained from the iliac crests through the pubic symphysis following the bolus administration of 100 mL of Omnipaque 350 nonionic contrast without complication. Low dose optimization technique was utilized for this CT exam including automated exposure control and adjustment of the mA and/or kV according to patient size. COMPARISON: MRI scan of the pelvis 5/20/2019 and CT chest abdomen and pelvis 4/23/2019 FINDINGS: Again redemonstrated is a 3.2 x 2 cm multiloculated low-attenuation collection in the right iliacus muscle unchanged from recent MRI scan of the pelvis. There is  also a 2.8 x 1.7 cm low-attenuation collection in the right gluteus medius muscle. This collection is also unchanged from recent MRI scan of the pelvis. Previous fixation screws are noted coursing through the right lateral ilium across the sacrum extending into the left lateral ilium. There is a healing fracture of the right posterior ilium. There is no evidence of free fluid in the pelvis or pelvic mass. There are scattered air-fluid levels throughout the small bowel     1.  Stable abscesses again redemonstrated in the right iliac's muscle and right gluteus medius muscle. 2.  Status post fixation screws coursing through the jose antonio and sacrum for treatment of healing fracture in the right posterior ilium. 3.  Adynamic ileus.    Dx-chest-portable (1 View)    Result Date: 5/21/2019 5/21/2019 2:30 PM HISTORY/REASON FOR EXAM:  Right-sided neck pain. TECHNIQUE/EXAM DESCRIPTION AND NUMBER OF VIEWS: Single portable view of the chest. COMPARISON: 4/30/2019 FINDINGS: Single portable view of the chest demonstrates a normal cardiac silhouette and mediastinal contours. Calcification is in the aortic knob. No discrete opacity, pleural fluid, or pneumothorax. A right PICC line remains in place. The tip appears to be located at the cavoatrial junction.     1.  No acute cardiopulmonary findings. 2.  Right PICC line place with the tip projecting over the cavoatrial junction    Dx-chest-portable (1 View)    Result Date: 4/30/2019 4/30/2019 4:19 PM HISTORY/REASON FOR EXAM:  PICC line placement. IV access necessity. TECHNIQUE/EXAM DESCRIPTION AND NUMBER OF VIEWS: Single portable view of the chest. COMPARISON:  4/22/2019 FINDINGS: A right arm PICC line is present in satisfactory position with its tip projecting over the SVC at the level of the jennifer. The cardiac silhouette is within normal limits. No infiltrates or consolidations are noted. No pleural effusion is noted.     1.  Right arm PICC line tip projects in satisfactory position.  2.  Clear chest.    Mr-brain-with & W/o    Result Date: 5/21/2019 5/20/2019 3:37 PM HISTORY/REASON FOR EXAM:  Follow-up brain abscesses. TECHNIQUE/EXAM DESCRIPTION:   MRI of the brain without and with contrast. T1 sagittal, T2 fast spin-echo axial, T1 coronal, FLAIR coronal, diffusion-weighted and apparent diffusion coefficient (ADC map) axial images were obtained of the whole brain. T1 postcontrast axial and T1 postcontrast coronal images were obtained. The study was performed on a [employer] 1.5 Miranda MRI scanner. 6 mL Gadavist contrast was administered intravenously. COMPARISON:  MRI scan of the brain 4/24/2019 FINDINGS: There are innumerable small ovoid enhancing foci throughout the brain parenchyma, both in the supratentorial and infratentorial compartments. The number of these lesions has increased significantly since previous exam. The previously identified largest lesion in the right brachium pontis has resolved. There is evidence of increased T2 signal intensity in many of these lesions and there is surrounding increased T2 signal intensity in several lesions in the right superior frontal lobe. The calvariae are unremarkable. There are no extra-axial fluid collections. The ventricular system and basal cisterns are mild to moderately prominent. There is mild-to-moderate prominence of the cortical sulci and gyri. There are no mass effects or shift of midline structures. There are no hemorrhagic lesions. The diffusion-weighted axial images show no evidence of acute cerebral infarction. The brainstem and posterior fossa structures are unremarkable. Vascular flow voids in the vertebrobasilar and carotid arteries, [place], and dural venous sinuses are intact. The paranasal sinuses and mastoids in the field of view are unremarkable.     1.  Increase in number of innumerable small ovoid enhancing lesions throughout the brain parenchyma. These lesions may represent microabscesses of either bacterial or  fungal origin or possibly brain metastases. 2.  Age-related cerebral atrophy.    Mr-pelvis-with & W/o And Sequences    Result Date: 5/21/2019 5/20/2019 3:37 PM HISTORY/REASON FOR EXAM:  Abd pain, fever, abscess suspected; evaluate abscesses TECHNIQUE/EXAM DESCRIPTION: MRI of the pelvis without and with contrast. The study was performed on a [employer] Sign[employer] 1.5 Miranda MRI scanner. T1 axial, T1 coronal, T2 fast spin-echo fat-suppressed axial, and T2 fast spin-echo fat-suppressed coronal images were obtained of the pelvis. Postcontrast fat-suppressed intravenous gadolinium enhanced sequences in the axial and coronal planes were then  performed. 6 mL Gadavist contrast was administered intravenously. COMPARISON: 3/26/2019. CT 4/23/2019 FINDINGS: Interval decrease in size of the collection in the right gluteal medius muscle, measuring 1.4 x 1.9 cm, previously 2.3 x 2.8 cm. There is minimal surrounding stranding. Mild heterogeneity and stranding in the overlying right gluteus cornelio muscle without discrete collection. Grossly unchanged in size of the multiloculated collection in the right iliacus muscle, measuring 2.4 x 3.5 cm. Postsurgical change in the sacrum with susceptibility artifact from the screws. Moderate osteoarthritis of the bilateral hip joints.     1. Interval decrease in size of the collection in the right gluteal medius muscle, measuring 1.4 x 1.9 cm, previously 2.3 x 2.8 cm. There is minimal surrounding stranding. Mild heterogeneity and stranding in the overlying right gluteus cornelio muscle without discrete collection, could relate to reactive change or phlegmon. 2. Grossly unchanged in size of the multiloculated collection in the right iliacus muscle, measuring 2.4 x 3.5 cm.    Us-chest    Result Date: 5/27/2019 5/27/2019 10:23 AM HISTORY/REASON FOR EXAM:  Right lateral breast pain, evaluate implant TECHNIQUE/EXAM DESCRIPTION AND NUMBER OF VIEWS: Targeted ultrasound of the right breast. COMPARISON: None  FINDINGS: No gross mass or implant rupture are identified. Diagnostic assessment is not feasible lack of proper equipment tube performed breast imaging     1.  Nondiagnostic assessment of the right breast without gross implant rupture identified 2.  Recommend further assessment with mammography and ultrasound at an accredited breast facility    Ec-halie W/o Cont    Result Date: 2019  Transesophageal Echo Report Echocardiography Laboratory CONCLUSIONS Normal esophageal echocardiogram. No evidence of endocarditis. No significant change since the prior study of 3/15/2019. MARLENA CLIFFORD Exam Date:          2019                     12:48 Exam Location:      Inpatient Priority:            Routine Ordering Physician:        DARON WHELAN Referring Physician: Sonographer:               CATALINO Rivers Age:    68     Gender:    F MRN:    [id number] :    [id number] BSA:           Ht (in):           Wt (lb): Report Type:      Complete Indications:     Endocarditis and heart valve disorders in diseases                  classified elsewhere ICD Codes:       I39 CPT Codes:       68862 BP:          /          HR: Technical Quality:       Fair MEASUREMENTS  (Male / Female) Normal Values 2D ECHO Estimated LV Ejection Fraction    65 %                  * Indicates values subject to auto-interpretation LV EF:  65    % Medications Medications determined by anesthesiologist. Limitations none Complications none Proc. Components The probe was inserted and manipulated by Dr Whelan. Probe #SM  was used for this procedure. 2D, color Doppler, spectral Doppler, and 3D imaging were used as part of the evaluation as clinically indicated. FINDINGS Left Ventricle The left ventricle was normal in size and function. Right Ventricle The right ventricle was normal in size and function. Right Atrium The right atrium is normal in size. Left Atrium The left atrium is normal  "in size. LA Appendage Normal left atrial appendage. IA Septum The interatrial septum is normal. IV Septum The interventricular septum is normal. Mitral Valve Structurally normal mitral valve without significant stenosis or regurgitation.  No valvular vegetations. Aortic Valve Structurally normal aortic valve without significant stenosis or regurgitation.  No valvular vegetations. Tricuspid Valve Structurally normal tricuspid valve without significant stenosis or regurgitation.  No valvular vegetations. Pulmonic Valve Structurally normal pulmonic valve without significant stenosis or regurgitation.  No valvular vegetations. Pericardium Normal pericardium without effusion. Aorta The aortic root is normal. Christopher Potter MD (Electronically Signed) Final Date:     24 May 2019 15:42      Micro:  Results     Procedure Component Value Units Date/Time    FLUID CULTURE W/GRAM STAIN [[id number]] Collected:  05/30/19 1339    Order Status:  Completed Specimen:  Body Fluid Updated:  06/01/19 0945     Significant Indicator NEG     Source BF     Site pelvic abcess     Culture Result No growth at 48 hours.     Gram Stain Result Few WBCs.  No organisms seen.      GRAM STAIN [[id number]] Collected:  05/30/19 1339    Order Status:  Completed Specimen:  Body Fluid Updated:  05/30/19 1832     Significant Indicator .     Source BF     Site pelvic abcess     Gram Stain Result Few WBCs.  No organisms seen.      FLUID CULTURE W/GRAM STAIN [[id number]]     Order Status:  No result Specimen:  Body Fluid from Other Body Fluid     FLUID CULTURE [[id number]]     Order Status:  No result Specimen:  Body Fluid from Other Body Fluid           Assessment:  Gluteal and iliopsoas abscess  Brain abscesses vs mets  Breast cancer  DM      Plan:  Gluteal and iliopsoas abscess s/p treatment.  Additional work-up ongoing  Afebrile  No leukocytosis  Adjacent to hardware in right hip (placed 12/17/18)  CT 4/23 \"Fluid collections within the right gluteal and " "iliacis muscles.. The collection within the right gluteal muscle has unchanged while the fluid collection within the right iliacis muscle is increased somewhat in size.\" 2.7 cm  Cultures 3/29 and 4/4 neg  MRI on 5/20/2019 - interval decrease in collection in R gluteal muscle and persistent multiloculated collection in R iliacus muscle.   CT 5/28 no change  Underwent drainage on 5/30/2019-cultures are negative  Ortho evaluation for removal of hardware pending  ESR is 32 and CRP has come down to 0.98 from 3.53     Brain abscesses vs mets  Afebrile  MRI 4/23 \"supra and infratentorial brain parenchyma... interval decrease in the size of the lesions. There are also interval reduction in the extent of the surrounding white matter edema in the restricted diffusion consistent with improvement/treatment response of the most of the multifocal brain abscesses.  Repeat MRI 5/21 showed innumerable microabscesses vs mets  Mult blood cultures neg  CHAS neg 3/15 and 5/24  No benefit of PET scan per notes  Abx (vancomycin, cefepime and Flagyl) discontinued on 5/23  Monitor neuro status closely     History of pelvic fracture  Status post pin placement and sacral  Eventually will need removal     Type 2 DM  Hemoglobin A1c 6.3   Keep BS under 150 to help control current infection      I have performed a physical exam and reviewed and updated ROS and plan today 5/31/2019.  In review of yesterday's note 5/30/2019, there are no changes except as documented above.    Discussed with internal medicine.  "

## 2019-06-02 PROCEDURE — A9270 NON-COVERED ITEM OR SERVICE: HCPCS | Performed by: HOSPITALIST

## 2019-06-02 PROCEDURE — 700102 HCHG RX REV CODE 250 W/ 637 OVERRIDE(OP): Performed by: HOSPITALIST

## 2019-06-02 PROCEDURE — 99232 SBSQ HOSP IP/OBS MODERATE 35: CPT | Performed by: INTERNAL MEDICINE

## 2019-06-02 PROCEDURE — A9270 NON-COVERED ITEM OR SERVICE: HCPCS | Performed by: INTERNAL MEDICINE

## 2019-06-02 PROCEDURE — 700102 HCHG RX REV CODE 250 W/ 637 OVERRIDE(OP): Performed by: NURSE PRACTITIONER

## 2019-06-02 PROCEDURE — A9270 NON-COVERED ITEM OR SERVICE: HCPCS | Performed by: NURSE PRACTITIONER

## 2019-06-02 PROCEDURE — 700102 HCHG RX REV CODE 250 W/ 637 OVERRIDE(OP): Performed by: INTERNAL MEDICINE

## 2019-06-02 PROCEDURE — 700111 HCHG RX REV CODE 636 W/ 250 OVERRIDE (IP): Performed by: NURSE PRACTITIONER

## 2019-06-02 PROCEDURE — 770006 HCHG ROOM/CARE - MED/SURG/GYN SEMI*

## 2019-06-02 PROCEDURE — 700101 HCHG RX REV CODE 250: Performed by: NURSE PRACTITIONER

## 2019-06-02 PROCEDURE — 51798 US URINE CAPACITY MEASURE: CPT

## 2019-06-02 RX ORDER — PRAMIPEXOLE DIHYDROCHLORIDE 0.5 MG/1
1 TABLET ORAL 3 TIMES DAILY
Status: DISCONTINUED | OUTPATIENT
Start: 2019-06-02 | End: 2019-07-11

## 2019-06-02 RX ADMIN — AMLODIPINE BESYLATE 10 MG: 5 TABLET ORAL at 04:29

## 2019-06-02 RX ADMIN — OXYCODONE HYDROCHLORIDE 10 MG: 10 TABLET ORAL at 09:21

## 2019-06-02 RX ADMIN — LOSARTAN POTASSIUM 50 MG: 50 TABLET ORAL at 04:29

## 2019-06-02 RX ADMIN — CINACALCET HYDROCHLORIDE 60 MG: 30 TABLET, COATED ORAL at 04:29

## 2019-06-02 RX ADMIN — ACETAMINOPHEN 650 MG: 325 TABLET, FILM COATED ORAL at 09:21

## 2019-06-02 RX ADMIN — OMEPRAZOLE 20 MG: 20 CAPSULE, DELAYED RELEASE ORAL at 04:29

## 2019-06-02 RX ADMIN — TEMAZEPAM 15 MG: 15 CAPSULE ORAL at 20:14

## 2019-06-02 RX ADMIN — METOPROLOL TARTRATE 25 MG: 25 TABLET, FILM COATED ORAL at 17:49

## 2019-06-02 RX ADMIN — SENNOSIDES AND DOCUSATE SODIUM 2 TABLET: 8.6; 5 TABLET ORAL at 17:48

## 2019-06-02 RX ADMIN — METOPROLOL TARTRATE 25 MG: 25 TABLET, FILM COATED ORAL at 04:29

## 2019-06-02 RX ADMIN — POLYETHYLENE GLYCOL 3350 1 PACKET: 17 POWDER, FOR SOLUTION ORAL at 09:28

## 2019-06-02 RX ADMIN — ACETAMINOPHEN 650 MG: 325 TABLET, FILM COATED ORAL at 17:48

## 2019-06-02 RX ADMIN — RIVAROXABAN 15 MG: 15 TABLET, FILM COATED ORAL at 17:49

## 2019-06-02 RX ADMIN — MORPHINE SULFATE 4 MG: 4 INJECTION INTRAVENOUS at 12:56

## 2019-06-02 RX ADMIN — OXYCODONE HYDROCHLORIDE 10 MG: 10 TABLET ORAL at 04:47

## 2019-06-02 RX ADMIN — MAGNESIUM HYDROXIDE 30 ML: 400 SUSPENSION ORAL at 17:49

## 2019-06-02 RX ADMIN — CYCLOBENZAPRINE 10 MG: 10 TABLET, FILM COATED ORAL at 17:49

## 2019-06-02 RX ADMIN — PRAMIPEXOLE DIHYDROCHLORIDE 1 MG: 0.5 TABLET ORAL at 12:39

## 2019-06-02 RX ADMIN — LIDOCAINE 2 PATCH: 50 PATCH CUTANEOUS at 15:05

## 2019-06-02 RX ADMIN — ATORVASTATIN CALCIUM 10 MG: 10 TABLET, FILM COATED ORAL at 17:48

## 2019-06-02 RX ADMIN — SENNOSIDES AND DOCUSATE SODIUM 2 TABLET: 8.6; 5 TABLET ORAL at 04:29

## 2019-06-02 RX ADMIN — CYCLOBENZAPRINE 10 MG: 10 TABLET, FILM COATED ORAL at 09:21

## 2019-06-02 RX ADMIN — PRAMIPEXOLE DIHYDROCHLORIDE 1 MG: 0.5 TABLET ORAL at 20:15

## 2019-06-02 RX ADMIN — OXYCODONE HYDROCHLORIDE 10 MG: 10 TABLET ORAL at 15:11

## 2019-06-02 ASSESSMENT — ENCOUNTER SYMPTOMS
MYALGIAS: 1
BRUISES/BLEEDS EASILY: 0
WEAKNESS: 1
COUGH: 0
SPEECH CHANGE: 0
ABDOMINAL PAIN: 0
DOUBLE VISION: 0
DIARRHEA: 0
DEPRESSION: 0
DIZZINESS: 0
TREMORS: 0
CONSTIPATION: 0
VOMITING: 0
HEADACHES: 0
SHORTNESS OF BREATH: 0
NAUSEA: 0
PALPITATIONS: 0
CHILLS: 0
FEVER: 0
SENSORY CHANGE: 0
NERVOUS/ANXIOUS: 1
TINGLING: 0
BLURRED VISION: 0
INSOMNIA: 0

## 2019-06-02 NOTE — ASSESSMENT & PLAN NOTE
Xarelto was held due to hip hematoma and rifampin  Consider starting coumadin in 1  weeks if h/h remains stable

## 2019-06-02 NOTE — PROGRESS NOTES
Stable vitals, iv fluids tko, oxy and tylenol for pain, mirapex for restless legs, slept from 2000 to 0400, states feels rested, pain is controlled, bed pan use x 2

## 2019-06-02 NOTE — CARE PLAN
Problem: Safety  Goal: Will remain free from injury  Intervention: Provide assistance with mobility  Patient up with 1 assist, call light in reach, axox4    Problem: Infection  Goal: Will remain free from infection  Outcome: PROGRESSING AS EXPECTED  Patient mentation, I/os, labs, and vitals monitored throughout shift    Problem: Pain Management  Goal: Pain level will decrease to patient's comfort goal  Outcome: PROGRESSING AS EXPECTED  Patient pain assessed qshift and prn

## 2019-06-02 NOTE — PROGRESS NOTES
Hospital Medicine Daily Progress Note    Date of Service  6/2/2019    Chief Complaint  Persistent abscess    Hospital Course   Ms. Martini is a 68-year-old female who was transferred from [facility] on 5/29/2019 with persistent abscesses.  She initially presented with pelvic pain, low back pain, and confusion. She also had new slurred speech. Imaging of the abdomen, pelvis and brain revealed abscesses and she was treated with a full course of IV antibiotics per ID.  Her slurred speech improved and she was able to ambulate with physical therapy. However, repeat imaging showed persistent pelvic abscesses and new brain enhancement. Cultures remained negative and a CHAS done by Dr. Potter showed no vegetations. The size of the pelvic abscesses was reviewed by interventional radiology and the case was discussed with Dr. Finley who recommended transfer to [facility] for CT guided drainage.       Interval Problem Update  5/31:  S/P pelvic abscess drainage yesterday.  Complaining of pain to the site.  Procedural site with no evidence of infection and dressing C/D/I.  Patient otherwise has no complaints.  She is afebrile with stable vital signs.    6/1:  Complaining of significant pian to left hip.  This is a chronic issue, but currently uncontrolled.  Pain regimen adjusted.  Reports she has had difficultly sleeping the past 2 nights due to discomfort.  Sleep aid provided.  6/2:  Pain much better controlled today.  Restarted on her home pramipexole for restless leg syndrome.  She reports sleeping much better overnight.  VSS.     Consultants/Specialty  Interventional radiology  ID    Code Status  DNAR/DNI    Disposition  Clinical for now.  Anticipate transfer to [facility] when medically clear.  She has been accepted.   Will follow with ortho surgery for possible hardware removal prior to discharge.    Review of Systems  Review of Systems   Constitutional: Negative for chills and fever.   HENT: Negative for congestion.    Eyes:  Negative for blurred vision and double vision.   Respiratory: Negative for cough and shortness of breath.    Cardiovascular: Negative for chest pain, palpitations and leg swelling.   Gastrointestinal: Negative for abdominal pain, constipation, diarrhea, nausea and vomiting.   Genitourinary: Negative for dysuria and urgency.   Musculoskeletal: Positive for joint pain and myalgias.        Left hip/thigh pain   Skin: Negative for itching and rash.   Neurological: Positive for weakness. Negative for dizziness, tingling, tremors, sensory change, speech change and headaches.   Endo/Heme/Allergies: Does not bruise/bleed easily.   Psychiatric/Behavioral: Negative for depression. The patient is nervous/anxious. The patient does not have insomnia.       Physical Exam  Temp:  [36.4 °C (97.6 °F)-36.9 °C (98.5 °F)] 36.9 °C (98.4 °F)  Pulse:  [74-93] 75  Resp:  [16-20] 18  BP: (129-141)/(68-70) 129/68  SpO2:  [93 %-98 %] 94 %    Physical Exam   Constitutional: She is oriented to person, place, and time. She appears well-developed and well-nourished. She is active and cooperative. She does not appear ill.   HENT:   Head: Normocephalic and atraumatic.   Right Ear: External ear normal.   Left Ear: External ear normal.   Mouth/Throat: No oropharyngeal exudate.   Eyes: Conjunctivae are normal. Right eye exhibits no discharge. Left eye exhibits no discharge.   Neck: Normal range of motion. Neck supple. No tracheal deviation present.   Cardiovascular: Normal rate, regular rhythm, normal heart sounds and intact distal pulses.    No murmur heard.  Pulmonary/Chest: Effort normal and breath sounds normal. No accessory muscle usage or stridor. No tachypnea. No respiratory distress. She has no decreased breath sounds. She has no wheezes. She has no rhonchi.   Abdominal: Soft. Bowel sounds are normal. She exhibits no distension. There is no tenderness (very mild tenderness in her epigastric region). There is no rigidity, no rebound and no  guarding.   Musculoskeletal: Normal range of motion. She exhibits no edema or deformity.   Neurological: She is alert and oriented to person, place, and time. She has normal strength. No cranial nerve deficit or sensory deficit. GCS eye subscore is 4. GCS verbal subscore is 5. GCS motor subscore is 6.   Skin: Skin is warm and dry. She is not diaphoretic. No erythema.   Psychiatric: Her speech is normal. Her mood appears anxious. Cognition and memory are normal.   Nursing note and vitals reviewed.    Fluids    Intake/Output Summary (Last 24 hours) at 06/02/19 1026  Last data filed at 06/02/19 0949   Gross per 24 hour   Intake              680 ml   Output                0 ml   Net              680 ml     Laboratory  Recent Labs      05/31/19   0420   WBC  4.7*   RBC  4.54   HEMOGLOBIN  12.5   HEMATOCRIT  38.2   MCV  84.1   MCH  27.5   MCHC  32.7*   RDW  59.4*   PLATELETCT  247   MPV  10.3     Recent Labs      05/31/19   0420   SODIUM  134*   POTASSIUM  3.8   CHLORIDE  100   CO2  27   GLUCOSE  85   BUN  20   CREATININE  0.54   CALCIUM  10.0         Imaging  CT-DRAIN-HEMATOMA - SEROMA   Final Result      CT-guided RIGHT pelvic fluid collection aspiration, laboratory evaluation pending            Assessment/Plan  * Abscess of right iliac muscle and right gluteus medius muscle- (present on admission)   Assessment & Plan    S/P CT guided abscess drainage on 5/30  Removed 2ml  Culture negative.   No need for antibiotics at this time.  ID following.  Will discuss hardware removal from prior right sacral fx repair with Dr. Porter.     Hypomagnesemia- (present on admission)   Assessment & Plan    Repleted  Follow labs.     RLS (restless legs syndrome)- (present on admission)   Assessment & Plan    Restarted on her home dose of pramipexole.        History of DVT (deep vein thrombosis)- (present on admission)   Assessment & Plan    Restart home xarelto.     Hypercalcemia- (present on admission)   Assessment & Plan    Chronic.  Continue sensipar.      Essential hypertension- (present on admission)   Assessment & Plan    Well controlled on current regimen. Continue home losartan, metoprolol, & amlodipine.      Chronic hyponatremia- (present on admission)   Assessment & Plan    At baseline, continue to monitor.      Chronic pain- (present on admission)   Assessment & Plan    Much improved today.  Primarily located at left hip.  Continue PRN oxycodone and flexeril.  Morphine for breakthrough  Lidocaine patches      History of breast cancer- (present on admission)   Assessment & Plan    S/p left mastectomy. Breast implant present there now.      COPD (chronic obstructive pulmonary disease) (HCC)- (present on admission)   Assessment & Plan    Stable. Doing well on room air. No respiratory distress/SOB. Lungs clear throughout. No evidence of exacerbation but will continue to monitor.         VTE prophylaxis: Xarelto.

## 2019-06-03 LAB
BACTERIA FLD AEROBE CULT: NORMAL
GRAM STN SPEC: NORMAL
SIGNIFICANT IND 70042: NORMAL
SITE SITE: NORMAL
SOURCE SOURCE: NORMAL

## 2019-06-03 PROCEDURE — 700101 HCHG RX REV CODE 250: Performed by: NURSE PRACTITIONER

## 2019-06-03 PROCEDURE — 700111 HCHG RX REV CODE 636 W/ 250 OVERRIDE (IP): Performed by: HOSPITALIST

## 2019-06-03 PROCEDURE — A9270 NON-COVERED ITEM OR SERVICE: HCPCS | Performed by: NURSE PRACTITIONER

## 2019-06-03 PROCEDURE — 700102 HCHG RX REV CODE 250 W/ 637 OVERRIDE(OP): Performed by: INTERNAL MEDICINE

## 2019-06-03 PROCEDURE — 770006 HCHG ROOM/CARE - MED/SURG/GYN SEMI*

## 2019-06-03 PROCEDURE — 700102 HCHG RX REV CODE 250 W/ 637 OVERRIDE(OP): Performed by: HOSPITALIST

## 2019-06-03 PROCEDURE — A9270 NON-COVERED ITEM OR SERVICE: HCPCS | Performed by: HOSPITALIST

## 2019-06-03 PROCEDURE — 99231 SBSQ HOSP IP/OBS SF/LOW 25: CPT | Performed by: INTERNAL MEDICINE

## 2019-06-03 PROCEDURE — 700111 HCHG RX REV CODE 636 W/ 250 OVERRIDE (IP): Performed by: INTERNAL MEDICINE

## 2019-06-03 PROCEDURE — 700102 HCHG RX REV CODE 250 W/ 637 OVERRIDE(OP): Performed by: NURSE PRACTITIONER

## 2019-06-03 PROCEDURE — 99232 SBSQ HOSP IP/OBS MODERATE 35: CPT | Performed by: INTERNAL MEDICINE

## 2019-06-03 PROCEDURE — A9270 NON-COVERED ITEM OR SERVICE: HCPCS | Performed by: INTERNAL MEDICINE

## 2019-06-03 RX ADMIN — OXYCODONE HYDROCHLORIDE 10 MG: 10 TABLET ORAL at 22:22

## 2019-06-03 RX ADMIN — LIDOCAINE 2 PATCH: 50 PATCH CUTANEOUS at 12:08

## 2019-06-03 RX ADMIN — OXYCODONE HYDROCHLORIDE 10 MG: 10 TABLET ORAL at 12:08

## 2019-06-03 RX ADMIN — ACETAMINOPHEN 650 MG: 325 TABLET, FILM COATED ORAL at 17:48

## 2019-06-03 RX ADMIN — CYCLOBENZAPRINE 10 MG: 10 TABLET, FILM COATED ORAL at 08:04

## 2019-06-03 RX ADMIN — PRAMIPEXOLE DIHYDROCHLORIDE 1 MG: 0.5 TABLET ORAL at 16:30

## 2019-06-03 RX ADMIN — METOPROLOL TARTRATE 25 MG: 25 TABLET, FILM COATED ORAL at 17:48

## 2019-06-03 RX ADMIN — OMEPRAZOLE 20 MG: 20 CAPSULE, DELAYED RELEASE ORAL at 04:41

## 2019-06-03 RX ADMIN — SENNOSIDES AND DOCUSATE SODIUM 2 TABLET: 8.6; 5 TABLET ORAL at 17:48

## 2019-06-03 RX ADMIN — OXYCODONE HYDROCHLORIDE 10 MG: 10 TABLET ORAL at 08:04

## 2019-06-03 RX ADMIN — ACETAMINOPHEN 650 MG: 325 TABLET, FILM COATED ORAL at 08:04

## 2019-06-03 RX ADMIN — OXYCODONE HYDROCHLORIDE 10 MG: 10 TABLET ORAL at 03:21

## 2019-06-03 RX ADMIN — ATORVASTATIN CALCIUM 10 MG: 10 TABLET, FILM COATED ORAL at 17:48

## 2019-06-03 RX ADMIN — OXYCODONE HYDROCHLORIDE 10 MG: 10 TABLET ORAL at 17:48

## 2019-06-03 RX ADMIN — PRAMIPEXOLE DIHYDROCHLORIDE 1 MG: 0.5 TABLET ORAL at 21:30

## 2019-06-03 RX ADMIN — ONDANSETRON 4 MG: 4 TABLET, ORALLY DISINTEGRATING ORAL at 18:35

## 2019-06-03 RX ADMIN — CYCLOBENZAPRINE 10 MG: 10 TABLET, FILM COATED ORAL at 21:30

## 2019-06-03 RX ADMIN — CINACALCET HYDROCHLORIDE 60 MG: 30 TABLET, COATED ORAL at 04:41

## 2019-06-03 RX ADMIN — METOPROLOL TARTRATE 25 MG: 25 TABLET, FILM COATED ORAL at 04:41

## 2019-06-03 RX ADMIN — AMLODIPINE BESYLATE 10 MG: 5 TABLET ORAL at 04:41

## 2019-06-03 RX ADMIN — ENOXAPARIN SODIUM 60 MG: 100 INJECTION SUBCUTANEOUS at 17:48

## 2019-06-03 RX ADMIN — PRAMIPEXOLE DIHYDROCHLORIDE 1 MG: 0.5 TABLET ORAL at 04:41

## 2019-06-03 RX ADMIN — LOSARTAN POTASSIUM 50 MG: 50 TABLET ORAL at 04:41

## 2019-06-03 ASSESSMENT — ENCOUNTER SYMPTOMS
BRUISES/BLEEDS EASILY: 0
DEPRESSION: 0
DIARRHEA: 0
SHORTNESS OF BREATH: 0
FEVER: 0
NAUSEA: 0
DIZZINESS: 0
MYALGIAS: 1
TREMORS: 0
VOMITING: 0
HEADACHES: 0
BLURRED VISION: 0
SENSORY CHANGE: 0
INSOMNIA: 0
TINGLING: 0
NECK PAIN: 0
ABDOMINAL PAIN: 0
NERVOUS/ANXIOUS: 1
CHILLS: 0
WEAKNESS: 1
SORE THROAT: 0

## 2019-06-03 NOTE — PROGRESS NOTES
· Assumed care at 0645  · Pt A&O x 4, having bilat hip pain - gave prn meds with relief.   · Pt continent and using bedpan, calling appropriately.  · No complaints otherwise, will cont to monitor.

## 2019-06-03 NOTE — PROGRESS NOTES
"   Orthopaedic PA Progress Note    Interval changes:Asked to see pt regarding recurrent sacral abscesses   now 6 months postop Transsacral screw fixation, right sacral fracture, S1 and S2, on 12/17/2018    ROS - Patient denies any new issues. No chest pain, dyspnea, or fever.  Pain well controlled.    /62   Pulse 82   Temp 37.5 °C (99.5 °F) (Temporal)   Resp 16   Ht 1.575 m (5' 2\")   Wt 59.2 kg (130 lb 8.2 oz)   SpO2 94%     Patient seen and examined  No acute distress  Breathing non labored  RRR    Active Hospital Problems    Diagnosis   • Abscess of right iliac muscle and right gluteus medius muscle [L02.91]     Priority: High   • Hypomagnesemia [E83.42]     Priority: Medium   • RLS (restless legs syndrome) [G25.81]     Priority: Medium   • Hypercalcemia [E83.52]     Priority: Low   • Essential hypertension [I10]     Priority: Low   • Chronic hyponatremia [E87.1]     Priority: Low   • History of breast cancer [Z85.3]     Priority: Low   • COPD (chronic obstructive pulmonary disease) (HCC) [J44.9]     Priority: Low   • Chronic pain [G89.29]     Priority: Low   • History of DVT (deep vein thrombosis) [Z86.718]       Assessment/Plan:  Recurrent abscesses  D/w hospitalist and surgeon  Now 6 months postop  Plan: hold anticoagulants  Surg Wednesday for HWR  NPO Tuesday night at [place] x sips H2O with meds      "

## 2019-06-03 NOTE — PROGRESS NOTES
Stable vitals, iv fluids tko, oxy and tylenol for pain, mirapex for restless legs, slept from 2000 to 0400, states feels rested, pain is controlled, bed pan use, bm loose x 4

## 2019-06-03 NOTE — PROGRESS NOTES
Hospital Medicine Daily Progress Note    Date of Service  6/3/2019    Chief Complaint  Persistent abscess    Hospital Course   Ms. Martini is a 68-year-old female who was transferred from [facility] on 5/29/2019 with persistent right gluteal abscesses since sacral fracture repair in December.  She has had multiple IR drainage.  She initially presented with pelvic pain, low back pain, and confusion. She also had new slurred speech. Imaging of the abdomen, pelvis and brain revealed abscesses and she was treated with a full course of IV antibiotics per ID.  Her slurred speech improved and she was able to ambulate with physical therapy. However, repeat imaging showed persistent pelvic abscesses.  MRI brain shows increase in brain lesions.  Her brain lesions are known from prior imaging and there is concern they represent metastasis.  Oncology aware and do not recommend further imaging. Cultures remained negative and a CHAS done by Dr. Potter showed no vegetations. The size of the pelvic abscesses was reviewed by interventional radiology and the case was discussed with Dr. Finley who recommended transfer to [facility] for CT guided drainage.  S/P drainage on 5/30.  Ortho to evaluate for possible hardware removal.       Interval Problem Update  5/31:  S/P pelvic abscess drainage yesterday.  Complaining of pain to the site.  Procedural site with no evidence of infection and dressing C/D/I.  Patient otherwise has no complaints.  She is afebrile with stable vital signs.    6/1:  Complaining of significant pian to left hip.  This is a chronic issue, but currently uncontrolled.  Pain regimen adjusted.  Reports she has had difficultly sleeping the past 2 nights due to discomfort.  Sleep aid provided.  6/2:  Pain much better controlled today.  Restarted on her home pramipexole for restless leg syndrome.  She reports sleeping much better overnight.  VSS.   6/3:  Slept well overnight.  Pain continues to be controlled.  Awaiting ortho  evaluation.  No acute needs.    Consultants/Specialty  Interventional radiology  ID  Ortho     Code Status  DNAR/DNI    Disposition  Clinical for now.  Anticipate transfer to [facility] when medically clear.  She has been accepted.   Awaiting ortho evaluation prior to discharge.    Review of Systems  Review of Systems   Constitutional: Negative for chills, fever and malaise/fatigue.   HENT: Negative for congestion and sore throat.    Eyes: Negative for blurred vision.   Respiratory: Negative for shortness of breath.    Cardiovascular: Negative for chest pain and leg swelling.   Gastrointestinal: Negative for abdominal pain, diarrhea, nausea and vomiting.   Genitourinary: Negative for dysuria.   Musculoskeletal: Positive for joint pain and myalgias. Negative for neck pain.        Left hip/thigh pain   Skin: Negative for rash.   Neurological: Positive for weakness. Negative for dizziness, tingling, tremors, sensory change and headaches.   Endo/Heme/Allergies: Does not bruise/bleed easily.   Psychiatric/Behavioral: Negative for depression. The patient is nervous/anxious. The patient does not have insomnia.       Physical Exam  Temp:  [36.3 °C (97.3 °F)-37.5 °C (99.5 °F)] 37.5 °C (99.5 °F)  Pulse:  [60-96] 82  Resp:  [16-18] 16  BP: (120-146)/(54-86) 146/62  SpO2:  [91 %-96 %] 94 %    Physical Exam   Constitutional: She is oriented to person, place, and time. She appears well-developed and well-nourished. She is active and cooperative. She does not appear ill. No distress.   HENT:   Head: Normocephalic and atraumatic.   Eyes: Conjunctivae are normal. No scleral icterus.   Neck: Normal range of motion. Neck supple. No JVD present.   Cardiovascular: Normal rate, regular rhythm, normal heart sounds and intact distal pulses.  Exam reveals no friction rub.    No murmur heard.  Pulmonary/Chest: Effort normal and breath sounds normal. No accessory muscle usage or stridor. No tachypnea. No respiratory distress. She has no  decreased breath sounds. She has no wheezes. She has no rhonchi. She has no rales.   Abdominal: Soft. Bowel sounds are normal. She exhibits no distension. There is no tenderness (very mild tenderness in her epigastric region). There is no rigidity and no rebound.   Musculoskeletal: Normal range of motion. She exhibits no edema.   Neurological: She is alert and oriented to person, place, and time. She has normal strength. No sensory deficit. GCS eye subscore is 4. GCS verbal subscore is 5. GCS motor subscore is 6.   Skin: Skin is warm and dry. No rash noted. She is not diaphoretic. No erythema.   Psychiatric: Her speech is normal. Her mood appears anxious. Cognition and memory are normal.   Nursing note and vitals reviewed.    Fluids    Intake/Output Summary (Last 24 hours) at 06/03/19 1238  Last data filed at 06/03/19 1223   Gross per 24 hour   Intake              240 ml   Output                1 ml   Net              239 ml     Laboratory              Imaging  CT-DRAIN-HEMATOMA - SEROMA   Final Result      CT-guided RIGHT pelvic fluid collection aspiration, laboratory evaluation pending            Assessment/Plan  * Abscess of right iliac muscle and right gluteus medius muscle- (present on admission)   Assessment & Plan    Recurrent issue since sacral fx repair in December.  Has had multiple IR drainage and completed antibx.  S/P CT guided abscess drainage on 5/30  Removed 2ml  Culture negative.   No need for antibiotics at this time.  ID following.  Ortho consulted to determine need for hardware removal.     Hypomagnesemia- (present on admission)   Assessment & Plan    Repleted  Follow labs.     RLS (restless legs syndrome)- (present on admission)   Assessment & Plan    Restarted on her home dose of pramipexole.        History of DVT (deep vein thrombosis)- (present on admission)   Assessment & Plan    Continue home xarelto.     Hypercalcemia- (present on admission)   Assessment & Plan    Chronic. Continue  sensipar.      Essential hypertension- (present on admission)   Assessment & Plan    Well controlled on current regimen. Continue home losartan, metoprolol, & amlodipine.      Chronic hyponatremia- (present on admission)   Assessment & Plan    At baseline, continue to monitor.      Chronic pain- (present on admission)   Assessment & Plan    Better controlled.  Primarily located at left hip.  Continue PRN oxycodone and flexeril.  Morphine for breakthrough  Lidocaine patches      History of breast cancer- (present on admission)   Assessment & Plan    S/p left mastectomy. Breast implant present there now.      COPD (chronic obstructive pulmonary disease) (HCC)- (present on admission)   Assessment & Plan    Stable. Doing well on room air. No respiratory distress/SOB. Lungs clear throughout. No evidence of exacerbation but will continue to monitor.         VTE prophylaxis: Xarelto.

## 2019-06-03 NOTE — CARE PLAN
Problem: Nutritional:  Goal: Achieve adequate nutritional intake  Start on PO diet and Patient will consume >50% of meals   Outcome: PROGRESSING SLOWER THAN EXPECTED

## 2019-06-03 NOTE — DIETARY
Nutrition Services: Poor PO follow up. Diet= Diabetic. Per ADL, pt eating <50% of five out of seven documented meals. Pt confirms PO intake <50% with poor intake over past month. Pt states she was getting supplements previously but now not receiving them, pt likes them and states she could drink 3/day. Orders for supplements okay'd by MD. WT of 59.2 kg on 6/1 up from admit wt of 54.4 kg on 5/29.     Malnutrition Risk (per RD note on 5/30): Pt with moderate malnutriton in context of chronic illness related to multiple abscesses as evidenced by potential 15% weight loss in 1 month and moderate muscle wasting at temporal region and mild fat loss at orbital fat pads.        Recommendations/Plan:  1. Will add Boost Glucose Control with all meals.   2. Encourage PO and document all intake as % taken in ADL's to provide interdisciplinary communication across all shifts.   3. Monitor weight.  4. Nutrition rep will continue to see patient for ongoing meal and snack preferences.     RD following.

## 2019-06-04 ENCOUNTER — APPOINTMENT (OUTPATIENT)
Dept: RADIOLOGY | Facility: MEDICAL CENTER | Age: 69
DRG: 356 | End: 2019-06-04
Attending: INTERNAL MEDICINE
Payer: MEDICARE

## 2019-06-04 ENCOUNTER — APPOINTMENT (OUTPATIENT)
Dept: RADIOLOGY | Facility: MEDICAL CENTER | Age: 69
DRG: 356 | End: 2019-06-04
Attending: HOSPITALIST
Payer: MEDICARE

## 2019-06-04 PROBLEM — A41.9 SEPSIS (HCC): Status: ACTIVE | Noted: 2019-06-04

## 2019-06-04 PROCEDURE — 700102 HCHG RX REV CODE 250 W/ 637 OVERRIDE(OP): Performed by: NURSE PRACTITIONER

## 2019-06-04 PROCEDURE — 87040 BLOOD CULTURE FOR BACTERIA: CPT

## 2019-06-04 PROCEDURE — 700101 HCHG RX REV CODE 250: Performed by: NURSE PRACTITIONER

## 2019-06-04 PROCEDURE — 700111 HCHG RX REV CODE 636 W/ 250 OVERRIDE (IP): Performed by: INTERNAL MEDICINE

## 2019-06-04 PROCEDURE — 700111 HCHG RX REV CODE 636 W/ 250 OVERRIDE (IP): Performed by: NURSE PRACTITIONER

## 2019-06-04 PROCEDURE — 99232 SBSQ HOSP IP/OBS MODERATE 35: CPT | Performed by: HOSPITALIST

## 2019-06-04 PROCEDURE — 700102 HCHG RX REV CODE 250 W/ 637 OVERRIDE(OP): Performed by: INTERNAL MEDICINE

## 2019-06-04 PROCEDURE — 700105 HCHG RX REV CODE 258: Performed by: INTERNAL MEDICINE

## 2019-06-04 PROCEDURE — 36415 COLL VENOUS BLD VENIPUNCTURE: CPT

## 2019-06-04 PROCEDURE — A9270 NON-COVERED ITEM OR SERVICE: HCPCS | Performed by: INTERNAL MEDICINE

## 2019-06-04 PROCEDURE — A9270 NON-COVERED ITEM OR SERVICE: HCPCS | Performed by: NURSE PRACTITIONER

## 2019-06-04 PROCEDURE — 700105 HCHG RX REV CODE 258: Performed by: HOSPITALIST

## 2019-06-04 PROCEDURE — 71045 X-RAY EXAM CHEST 1 VIEW: CPT

## 2019-06-04 PROCEDURE — 770020 HCHG ROOM/CARE - TELE (206)

## 2019-06-04 PROCEDURE — A9270 NON-COVERED ITEM OR SERVICE: HCPCS | Performed by: HOSPITALIST

## 2019-06-04 PROCEDURE — 99233 SBSQ HOSP IP/OBS HIGH 50: CPT | Performed by: INTERNAL MEDICINE

## 2019-06-04 PROCEDURE — 700102 HCHG RX REV CODE 250 W/ 637 OVERRIDE(OP): Performed by: HOSPITALIST

## 2019-06-04 RX ORDER — SODIUM CHLORIDE 9 MG/ML
1000 INJECTION, SOLUTION INTRAVENOUS CONTINUOUS
Status: DISCONTINUED | OUTPATIENT
Start: 2019-06-04 | End: 2019-06-04

## 2019-06-04 RX ORDER — SODIUM CHLORIDE 9 MG/ML
500 INJECTION, SOLUTION INTRAVENOUS
Status: DISCONTINUED | OUTPATIENT
Start: 2019-06-04 | End: 2019-06-28

## 2019-06-04 RX ORDER — SODIUM CHLORIDE 9 MG/ML
1000 INJECTION, SOLUTION INTRAVENOUS ONCE
Status: COMPLETED | OUTPATIENT
Start: 2019-06-04 | End: 2019-06-04

## 2019-06-04 RX ADMIN — ACETAMINOPHEN 650 MG: 325 TABLET, FILM COATED ORAL at 14:58

## 2019-06-04 RX ADMIN — LOSARTAN POTASSIUM 50 MG: 50 TABLET ORAL at 05:50

## 2019-06-04 RX ADMIN — OXYCODONE HYDROCHLORIDE 10 MG: 10 TABLET ORAL at 17:45

## 2019-06-04 RX ADMIN — METOPROLOL TARTRATE 25 MG: 25 TABLET, FILM COATED ORAL at 05:50

## 2019-06-04 RX ADMIN — OXYCODONE HYDROCHLORIDE 10 MG: 10 TABLET ORAL at 02:47

## 2019-06-04 RX ADMIN — PRAMIPEXOLE DIHYDROCHLORIDE 1 MG: 0.5 TABLET ORAL at 12:35

## 2019-06-04 RX ADMIN — AMLODIPINE BESYLATE 10 MG: 5 TABLET ORAL at 05:50

## 2019-06-04 RX ADMIN — MORPHINE SULFATE 4 MG: 4 INJECTION INTRAVENOUS at 21:47

## 2019-06-04 RX ADMIN — VANCOMYCIN HYDROCHLORIDE 1500 MG: 100 INJECTION, POWDER, LYOPHILIZED, FOR SOLUTION INTRAVENOUS at 19:56

## 2019-06-04 RX ADMIN — PRAMIPEXOLE DIHYDROCHLORIDE 1 MG: 0.5 TABLET ORAL at 17:24

## 2019-06-04 RX ADMIN — SODIUM CHLORIDE 1000 ML: 9 INJECTION, SOLUTION INTRAVENOUS at 17:25

## 2019-06-04 RX ADMIN — ATORVASTATIN CALCIUM 10 MG: 10 TABLET, FILM COATED ORAL at 17:24

## 2019-06-04 RX ADMIN — LIDOCAINE 2 PATCH: 50 PATCH CUTANEOUS at 12:35

## 2019-06-04 RX ADMIN — SENNOSIDES AND DOCUSATE SODIUM 2 TABLET: 8.6; 5 TABLET ORAL at 05:50

## 2019-06-04 RX ADMIN — MORPHINE SULFATE 4 MG: 4 INJECTION INTRAVENOUS at 10:55

## 2019-06-04 RX ADMIN — OMEPRAZOLE 20 MG: 20 CAPSULE, DELAYED RELEASE ORAL at 05:50

## 2019-06-04 RX ADMIN — CINACALCET HYDROCHLORIDE 60 MG: 30 TABLET, COATED ORAL at 05:50

## 2019-06-04 RX ADMIN — OXYCODONE HYDROCHLORIDE 10 MG: 10 TABLET ORAL at 06:51

## 2019-06-04 RX ADMIN — ENOXAPARIN SODIUM 60 MG: 100 INJECTION SUBCUTANEOUS at 17:23

## 2019-06-04 RX ADMIN — PRAMIPEXOLE DIHYDROCHLORIDE 1 MG: 0.5 TABLET ORAL at 05:50

## 2019-06-04 RX ADMIN — METOPROLOL TARTRATE 25 MG: 25 TABLET, FILM COATED ORAL at 17:24

## 2019-06-04 RX ADMIN — CEFEPIME 2 G: 2 INJECTION, POWDER, FOR SOLUTION INTRAVENOUS at 17:23

## 2019-06-04 RX ADMIN — ENOXAPARIN SODIUM 60 MG: 100 INJECTION SUBCUTANEOUS at 05:50

## 2019-06-04 RX ADMIN — MORPHINE SULFATE 4 MG: 4 INJECTION INTRAVENOUS at 14:17

## 2019-06-04 RX ADMIN — ACETAMINOPHEN 650 MG: 325 TABLET, FILM COATED ORAL at 21:47

## 2019-06-04 ASSESSMENT — ENCOUNTER SYMPTOMS
SHORTNESS OF BREATH: 0
PALPITATIONS: 0
CHILLS: 1
FALLS: 0
FOCAL WEAKNESS: 1
NAUSEA: 0
WEAKNESS: 1
MYALGIAS: 1
HEADACHES: 1
ABDOMINAL PAIN: 0
VOMITING: 0
FEVER: 1
BLOOD IN STOOL: 0
LOSS OF CONSCIOUSNESS: 0

## 2019-06-04 ASSESSMENT — LIFESTYLE VARIABLES: SUBSTANCE_ABUSE: 1

## 2019-06-04 NOTE — PROGRESS NOTES
"Pharmacy Kinetics 68 y.o. female on vancomycin day 1 2019    Currently on Vancomycin 1500 mg iv X 1 LDh    Indication for Treatment: Abscess(R/O Brain Abscess)  Iliopsoas abscess    Pertinent history per medical record: Admitted on 2019 for Abscess of iliac fossa.  Pt underwent drainage.    Other antibiotics: Cefepime 2g Q12H    Allergies: Patient has no known allergies.     List concerns for renal function:    Age   BUN/CR > 20:1   2/4 SIRS (fever/tachy)    Pertinent cultures to date:   No growth on Pelvic abscess on 19    MRSA nares swab if pneumonia is a concern (ordered/positive/negative/n-a): N/A      /67   Pulse (!) 120   Temp (!) 38.3 °C (101 °F) (Oral)   Resp 18   Ht 1.575 m (5' 2\")   Wt 59.2 kg (130 lb 8.2 oz)   SpO2 93%  Temp (24hrs), Av.5 °C (99.5 °F), Min:36.3 °C (97.4 °F), Max:38.3 °C (101 °F)      A/P   1. Vancomycin new start: 1500mg LD. 600mg q24H  2. Next vancomycin level:  prior to 3rd dose.  3. Goal trough: ~18 Mcg/mL  4. Comments: Concern for renal function.  a. Review of prior dosing indicates the above dose will obtain a trough level ~18.  b. No BMP since , confirmed CMP ordered with AM labs.  c. Pharmacy to follow up renal concerns and trough levels prior to ss.    Aurelia Schmidt, PharmD        "

## 2019-06-04 NOTE — PROGRESS NOTES
"TMax 103 today per CNA  Scheduled for surgery tomorrow  Will ask Med to work up fever  /71   Pulse 89   Temp 37.6 °C (99.6 °F) (Temporal)   Resp 19   Ht 1.575 m (5' 2\")   Wt 59.2 kg (130 lb 8.2 oz)   SpO2 92%   Breastfeeding? No   BMI 23.87 kg/m²     "

## 2019-06-04 NOTE — ASSESSMENT & PLAN NOTE
This is sepsis (without associated acute organ dysfunction).    Source - Right Iliac and Gluteus medius abscess  Resolved

## 2019-06-04 NOTE — PROGRESS NOTES
· Assumed care at 0645  · Pt A&O x 4, having bilat hip pain and giving prn pain meds with some relief.   · Pt using bedpan for urine and BM, declining to get out of bed d/t pain.   · Pt tolerating diet well.   · Surgery for hardware removal planned for Wednesday.   · Pt resting well otherwise, will cont to monitor.

## 2019-06-04 NOTE — PROGRESS NOTES
Infectious Disease Progress Note    Author: Beatriz Johnson M.D. Date & Time of service: 6/4/2019  2:14 PM    Chief Complaint:  Brain abscess, iliopsoas abscess, leukopenia      Interval History:  68 y.o. female admitted 5/29/2019 as a transfer from [facility].  She has bben there since 4/30/2019. + diabetes, SANTOSH of right hip with hardware, and breast cancer who was originally admitted to [facility] on 03/08/2019 for right hip and pelvic pain.  Work-up revealed a right iliopsoas lesion, gluteal abscess, and mult brain abscesses  5/6 Interval 24 hours of Vitals/Labs/Micro,and imaging results reviewed as available. See assessment.   5/10 AF WBC 3 continued c/o constipation denies HA, visual changes, neuro deficits  5/11 AF WBC 8.8 isolation stopped due to resolution neutropenia-sleepy today  5/12 AF no CBC somnolent-no acute events noted  5/13 AF WBC 6.2 c/o pain left hip today, unchanged Worse with movement and improved with ice. Refused PT yesterday   5/14 AF c/o dizzyness whenever she tirns on her side-no new HA or visual changes-still havng hip pain  5/15 AF sleeping at time of rounds-by report ambulating  5/16 AF weightbearing continues to improve-ambulating more.  No new complaints  5/20- still has some pain in the hip but ambulating without any issues. No fevers. In good spirits.  5/22/2019 continues to have hip pain.  No fevers have been noted  5/24 AF no CBC plan for CHAS today. Denies any headaches. States she is ambulating  5/25 afebrile CBC not done today.  Patient CHAS was negative.  She has a poor appetite and is requesting an appetite stimulant.  Ongoing left hip pain.  Plan for CT-guided drainage tomorrow  5/28 afebrile WBC 4.5.  Patient is resting comfortably.  She states that she has right breast pain.  Ultrasound of the breast otherwise unremarkable.  She is awaiting CT-guided drainage which has been delayed as CT scan malfunctioning yesterday.  5/29 afebrile, no CBC.  Patient denies any right hip  pain.  Continues to have left-sided hip pain especially with sitting. She had a repeat CT of the pelvis yesterday that revealed no changes in the abscess.  Per report, abscesses are too small for IR drainage given location and recommendation to transfer the patient to [facility] for CT-guided aspiration of the fluid for culture.  She continues to deny any fevers or chills.   AF c/o hungry and right hip pain Awaiting procedure. Denies SE abx  2019-no fevers.  Complains of pain in the hips.  Underwent aspiration of the pelvic abscess.  The cultures are negative  2019-no fevers.  Continues to complain of significant pain in her hips.  Pain medications are being adjusted.  2019-no fevers.  Continues to remain off the antibiotics.  Awaiting Ortho evaluation  2019 patient febrile up to 103.  Having shaking chills.  Is not feeling well.  Denies any specific complaints.  Labs Reviewed, Medications Reviewed, Radiology Reviewed and Wound Reviewed.    Review of Systems:  Review of Systems   Constitutional: Positive for chills and fever.   Gastrointestinal: Negative for nausea and vomiting.   Musculoskeletal: Positive for joint pain.   All other systems reviewed and are negative.      Hemodynamics:  Temp (24hrs), Av.8 °C (98.3 °F), Min:36.3 °C (97.3 °F), Max:37.6 °C (99.6 °F)  Temperature: 37.6 °C (99.6 °F)  Pulse  Av.7  Min: 60  Max: 96   Blood Pressure : 114/71       Physical Exam:  Physical Exam   Constitutional: She is oriented to person, place, and time. No distress.   Looks ill and shaking   HENT:   Mouth/Throat: No oropharyngeal exudate.   Eyes: Pupils are equal, round, and reactive to light. EOM are normal. No scleral icterus.   Neck: Neck supple.   Cardiovascular:   Tachycardic   Pulmonary/Chest: Effort normal. No respiratory distress. She has no wheezes. She has no rales.   Abdominal: Soft. She exhibits no distension.   Musculoskeletal: She exhibits tenderness. She exhibits no edema.    PICC line right upper extremity   Neurological: She is alert and oriented to person, place, and time.   Skin: Skin is warm. She is not diaphoretic.   Nursing note and vitals reviewed.      Meds:    Current Facility-Administered Medications:   •  enoxaparin (LOVENOX) injection  •  pramipexole  •  lidocaine  •  oxyCODONE immediate-release  •  temazepam  •  morphine injection  •  cyclobenzaprine  •  Respiratory Care per Protocol  •  amLODIPine  •  cinacalcet  •  losartan  •  acetaminophen  •  atorvastatin  •  omeprazole  •  metoprolol  •  senna-docusate **AND** polyethylene glycol/lytes **AND** magnesium hydroxide **AND** bisacodyl  •  ondansetron  •  ondansetron    Labs:  No results for input(s): WBC, RBC, HEMOGLOBIN, HEMATOCRIT, MCV, MCH, RDW, PLATELETCT, MPV, NEUTSPOLYS, LYMPHOCYTES, MONOCYTES, EOSINOPHILS, BASOPHILS, RBCMORPHOLO in the last 72 hours.  No results for input(s): SODIUM, POTASSIUM, CHLORIDE, CO2, GLUCOSE, BUN, CPKTOTAL in the last 72 hours.  No results for input(s): ALBUMIN, TBILIRUBIN, ALKPHOSPHAT, TOTPROTEIN, ALTSGPT, ASTSGOT, CREATININE in the last 72 hours.    Imaging:  Ct-pelvis With    Result Date: 5/28/2019 5/28/2019 12:54 PM HISTORY/REASON FOR EXAM: Follow-up abscess TECHNIQUE/EXAM DESCRIPTION AND NUMBER OF VIEWS: CT scan of the pelvis with contrast. Contrast-enhanced helical scanning of the pelvis was obtained from the iliac crests through the pubic symphysis following the bolus administration of 100 mL of Omnipaque 350 nonionic contrast without complication. Low dose optimization technique was utilized for this CT exam including automated exposure control and adjustment of the mA and/or kV according to patient size. COMPARISON: MRI scan of the pelvis 5/20/2019 and CT chest abdomen and pelvis 4/23/2019 FINDINGS: Again redemonstrated is a 3.2 x 2 cm multiloculated low-attenuation collection in the right iliacus muscle unchanged from recent MRI scan of the pelvis. There is also a 2.8 x 1.7 cm  low-attenuation collection in the right gluteus medius muscle. This collection is also unchanged from recent MRI scan of the pelvis. Previous fixation screws are noted coursing through the right lateral ilium across the sacrum extending into the left lateral ilium. There is a healing fracture of the right posterior ilium. There is no evidence of free fluid in the pelvis or pelvic mass. There are scattered air-fluid levels throughout the small bowel     1.  Stable abscesses again redemonstrated in the right iliac's muscle and right gluteus medius muscle. 2.  Status post fixation screws coursing through the jose antonio and sacrum for treatment of healing fracture in the right posterior ilium. 3.  Adynamic ileus.    Dx-chest-portable (1 View)    Result Date: 5/21/2019 5/21/2019 2:30 PM HISTORY/REASON FOR EXAM:  Right-sided neck pain. TECHNIQUE/EXAM DESCRIPTION AND NUMBER OF VIEWS: Single portable view of the chest. COMPARISON: 4/30/2019 FINDINGS: Single portable view of the chest demonstrates a normal cardiac silhouette and mediastinal contours. Calcification is in the aortic knob. No discrete opacity, pleural fluid, or pneumothorax. A right PICC line remains in place. The tip appears to be located at the cavoatrial junction.     1.  No acute cardiopulmonary findings. 2.  Right PICC line place with the tip projecting over the cavoatrial junction    Dx-chest-portable (1 View)    Result Date: 4/30/2019 4/30/2019 4:19 PM HISTORY/REASON FOR EXAM:  PICC line placement. IV access necessity. TECHNIQUE/EXAM DESCRIPTION AND NUMBER OF VIEWS: Single portable view of the chest. COMPARISON:  4/22/2019 FINDINGS: A right arm PICC line is present in satisfactory position with its tip projecting over the SVC at the level of the jennifer. The cardiac silhouette is within normal limits. No infiltrates or consolidations are noted. No pleural effusion is noted.     1.  Right arm PICC line tip projects in satisfactory position. 2.  Clear  chest.    Mr-brain-with & W/o    Result Date: 5/21/2019 5/20/2019 3:37 PM HISTORY/REASON FOR EXAM:  Follow-up brain abscesses. TECHNIQUE/EXAM DESCRIPTION:   MRI of the brain without and with contrast. T1 sagittal, T2 fast spin-echo axial, T1 coronal, FLAIR coronal, diffusion-weighted and apparent diffusion coefficient (ADC map) axial images were obtained of the whole brain. T1 postcontrast axial and T1 postcontrast coronal images were obtained. The study was performed on a [employer] Signa 1.5 Miranda MRI scanner. 6 mL Gadavist contrast was administered intravenously. COMPARISON:  MRI scan of the brain 4/24/2019 FINDINGS: There are innumerable small ovoid enhancing foci throughout the brain parenchyma, both in the supratentorial and infratentorial compartments. The number of these lesions has increased significantly since previous exam. The previously identified largest lesion in the right brachium pontis has resolved. There is evidence of increased T2 signal intensity in many of these lesions and there is surrounding increased T2 signal intensity in several lesions in the right superior frontal lobe. The calvariae are unremarkable. There are no extra-axial fluid collections. The ventricular system and basal cisterns are mild to moderately prominent. There is mild-to-moderate prominence of the cortical sulci and gyri. There are no mass effects or shift of midline structures. There are no hemorrhagic lesions. The diffusion-weighted axial images show no evidence of acute cerebral infarction. The brainstem and posterior fossa structures are unremarkable. Vascular flow voids in the vertebrobasilar and carotid arteries, [place], and dural venous sinuses are intact. The paranasal sinuses and mastoids in the field of view are unremarkable.     1.  Increase in number of innumerable small ovoid enhancing lesions throughout the brain parenchyma. These lesions may represent microabscesses of either bacterial or fungal origin  or possibly brain metastases. 2.  Age-related cerebral atrophy.    Mr-pelvis-with & W/o And Sequences    Result Date: 5/21/2019 5/20/2019 3:37 PM HISTORY/REASON FOR EXAM:  Abd pain, fever, abscess suspected; evaluate abscesses TECHNIQUE/EXAM DESCRIPTION: MRI of the pelvis without and with contrast. The study was performed on a [employer] Signa 1.5 Miranda MRI scanner. T1 axial, T1 coronal, T2 fast spin-echo fat-suppressed axial, and T2 fast spin-echo fat-suppressed coronal images were obtained of the pelvis. Postcontrast fat-suppressed intravenous gadolinium enhanced sequences in the axial and coronal planes were then  performed. 6 mL Gadavist contrast was administered intravenously. COMPARISON: 3/26/2019. CT 4/23/2019 FINDINGS: Interval decrease in size of the collection in the right gluteal medius muscle, measuring 1.4 x 1.9 cm, previously 2.3 x 2.8 cm. There is minimal surrounding stranding. Mild heterogeneity and stranding in the overlying right gluteus cornelio muscle without discrete collection. Grossly unchanged in size of the multiloculated collection in the right iliacus muscle, measuring 2.4 x 3.5 cm. Postsurgical change in the sacrum with susceptibility artifact from the screws. Moderate osteoarthritis of the bilateral hip joints.     1. Interval decrease in size of the collection in the right gluteal medius muscle, measuring 1.4 x 1.9 cm, previously 2.3 x 2.8 cm. There is minimal surrounding stranding. Mild heterogeneity and stranding in the overlying right gluteus cornelio muscle without discrete collection, could relate to reactive change or phlegmon. 2. Grossly unchanged in size of the multiloculated collection in the right iliacus muscle, measuring 2.4 x 3.5 cm.    Us-chest    Result Date: 5/27/2019 5/27/2019 10:23 AM HISTORY/REASON FOR EXAM:  Right lateral breast pain, evaluate implant TECHNIQUE/EXAM DESCRIPTION AND NUMBER OF VIEWS: Targeted ultrasound of the right breast. COMPARISON: None FINDINGS: No  gross mass or implant rupture are identified. Diagnostic assessment is not feasible lack of proper equipment tube performed breast imaging     1.  Nondiagnostic assessment of the right breast without gross implant rupture identified 2.  Recommend further assessment with mammography and ultrasound at an accredited breast facility    Ec-halie W/o Cont    Result Date: 2019  Transesophageal Echo Report Echocardiography Laboratory CONCLUSIONS Normal esophageal echocardiogram. No evidence of endocarditis. No significant change since the prior study of 3/15/2019. MARLENA CLIFFORD Exam Date:          2019                     12:48 Exam Location:      Inpatient Priority:            Routine Ordering Physician:        DARON WHELAN Referring Physician: Sonographer:               CATALINO Rivers Age:    68     Gender:    F MRN:    [id number] :    [id number] BSA:           Ht (in):           Wt (lb): Report Type:      Complete Indications:     Endocarditis and heart valve disorders in diseases                  classified elsewhere ICD Codes:       I39 CPT Codes:       16040 BP:          /          HR: Technical Quality:       Fair MEASUREMENTS  (Male / Female) Normal Values 2D ECHO Estimated LV Ejection Fraction    65 %                  * Indicates values subject to auto-interpretation LV EF:  65    % Medications Medications determined by anesthesiologist. Limitations none Complications none Proc. Components The probe was inserted and manipulated by Dr Whelan. Probe #SM  was used for this procedure. 2D, color Doppler, spectral Doppler, and 3D imaging were used as part of the evaluation as clinically indicated. FINDINGS Left Ventricle The left ventricle was normal in size and function. Right Ventricle The right ventricle was normal in size and function. Right Atrium The right atrium is normal in size. Left Atrium The left atrium is normal in size. LA  Appendage Normal left atrial appendage. IA Septum The interatrial septum is normal. IV Septum The interventricular septum is normal. Mitral Valve Structurally normal mitral valve without significant stenosis or regurgitation.  No valvular vegetations. Aortic Valve Structurally normal aortic valve without significant stenosis or regurgitation.  No valvular vegetations. Tricuspid Valve Structurally normal tricuspid valve without significant stenosis or regurgitation.  No valvular vegetations. Pulmonic Valve Structurally normal pulmonic valve without significant stenosis or regurgitation.  No valvular vegetations. Pericardium Normal pericardium without effusion. Aorta The aortic root is normal. Christopher Potter MD (Electronically Signed) Final Date:     24 May 2019 15:42      Micro:  Results     Procedure Component Value Units Date/Time    FLUID CULTURE W/GRAM STAIN [[id number]] Collected:  05/30/19 1339    Order Status:  Completed Specimen:  Body Fluid Updated:  06/03/19 0726     Significant Indicator NEG     Source BF     Site pelvic abcess     Culture Result No growth at 4 days.     Gram Stain Result Few WBCs.  No organisms seen.      GRAM STAIN [[id number]] Collected:  05/30/19 1339    Order Status:  Completed Specimen:  Body Fluid Updated:  05/30/19 1832     Significant Indicator .     Source BF     Site pelvic abcess     Gram Stain Result Few WBCs.  No organisms seen.      FLUID CULTURE W/GRAM STAIN [[id number]]     Order Status:  No result Specimen:  Body Fluid from Other Body Fluid     FLUID CULTURE [[id number]]     Order Status:  No result Specimen:  Body Fluid from Other Body Fluid           Assessment:  Gluteal and iliopsoas abscess  Brain abscesses vs mets  Breast cancer  DM      Plan:    New fevers  Panculture again  Pull the PICC line  Patient is for surgery tomorrow-  We have tried to hold antibiotics but unfortunately it seems that she may need antibiotics in view of these high fevers    Gluteal and  "iliopsoas abscess s/p treatment.  Additional work-up ongoing  Afebrile  No leukocytosis  Adjacent to hardware in right hip (placed 12/17/18)  CT 4/23 \"Fluid collections within the right gluteal and iliacis muscles.. The collection within the right gluteal muscle has unchanged while the fluid collection within the right iliacis muscle is increased somewhat in size.\" 2.7 cm  Cultures 3/29 and 4/4 neg  MRI on 5/20/2019 - interval decrease in collection in R gluteal muscle and persistent multiloculated collection in R iliacus muscle.   CT 5/28 no change  Underwent drainage on 5/30/2019-cultures are negative  Ortho evaluation for removal of hardware pending  ESR is 32 and CRP has come down to 0.98 from 3.53     Brain abscesses vs mets  Afebrile  MRI 4/23 \"supra and infratentorial brain parenchyma... interval decrease in the size of the lesions. There are also interval reduction in the extent of the surrounding white matter edema in the restricted diffusion consistent with improvement/treatment response of the most of the multifocal brain abscesses.  Repeat MRI 5/21 showed innumerable microabscesses vs mets  Mult blood cultures neg  CHAS neg 3/15 and 5/24  No benefit of PET scan per notes  Abx (vancomycin, cefepime and Flagyl) discontinued on 5/23  Monitor neuro status closely     History of pelvic fracture  Status post pin placement and sacral  Eventually will need removal     Type 2 DM  Hemoglobin A1c 6.3   Keep BS under 150 to help control current infection      I have performed a physical exam and reviewed and updated ROS and plan today 5/31/2019.  In review of yesterday's note 5/30/2019, there are no changes except as documented above.    Discussed with internal medicine.  "

## 2019-06-04 NOTE — PROGRESS NOTES
Assumed care of pt this afternoon around 1400. Pt reports pain and BP and temp elevated. PRN pain meds given per orders. HR remained elevated above 120, pt confused and restless. MD updated and saw pt at bedside as well. MD placed transfer orders and pt sent with tele nurse upon transfer. Report given to Sumi via telephone.

## 2019-06-04 NOTE — PROGRESS NOTES
Hospital Medicine Daily Progress Note    Date of Service  6/4/2019    Chief Complaint  68 y.o. female admitted 5/29/2019 with persistent abscess.    Hospital Course     Ms. Martini is a 68-year-old female who was transferred from [facility] on 5/29/2019 with persistent right gluteal abscesses since sacral fracture repair in December.  She has had multiple IR drainage.  She initially presented with pelvic pain, low back pain, and confusion. She also had new slurred speech. Imaging of the abdomen, pelvis and brain revealed abscesses and she was treated with a full course of IV antibiotics per ID.  Her slurred speech improved and she was able to ambulate with physical therapy. However, repeat imaging showed persistent pelvic abscesses.  MRI brain shows increase in brain lesions.  Her brain lesions are known from prior imaging and there is concern they represent metastasis.  Oncology aware and do not recommend further imaging. Cultures remained negative and a CHAS done by Dr. Potter showed no vegetations. The size of the pelvic abscesses was reviewed by interventional radiology and the case was discussed with Dr. Finley who recommended transfer to [facility] for CT guided drainage.  S/P drainage on 5/30.    Plan for hardware removal tomorrow on 6/5.       Interval Problem Update  Is doing well this morning with slight worsening of her left hip pain.  Developed fever with tachycardia this afternoon.     Consultants/Specialty  Infectious disease  Orthopedic surgery    Code Status  DNR/DNI    Disposition  Transfer to telemetry.  Anticipate SNF need.    Review of Systems  Review of Systems   Constitutional: Positive for chills, fever and malaise/fatigue.   HENT: Negative for congestion.    Respiratory: Negative for shortness of breath.    Cardiovascular: Negative for chest pain, palpitations and leg swelling.   Gastrointestinal: Negative for abdominal pain, blood in stool, nausea and vomiting.   Genitourinary: Negative for dysuria.    Musculoskeletal: Positive for joint pain and myalgias. Negative for falls.   Neurological: Positive for focal weakness (Secondary to pain), weakness (Secondary to pain) and headaches. Negative for loss of consciousness.   Psychiatric/Behavioral: Positive for substance abuse.   All other systems reviewed and are negative.       Physical Exam  Temp:  [36.3 °C (97.3 °F)-37.1 °C (98.8 °F)] 37.1 °C (98.8 °F)  Pulse:  [68-96] 96  Resp:  [18-19] 18  BP: (115-151)/(48-80) 151/79  SpO2:  [90 %-92 %] 91 %    Physical Exam   Constitutional: She is oriented to person, place, and time. She appears well-developed. She appears distressed.   HENT:   Head: Normocephalic.   Mouth/Throat: Oropharynx is clear and moist.   Eyes: Pupils are equal, round, and reactive to light. EOM are normal. Right eye exhibits no discharge. Left eye exhibits no discharge. No scleral icterus.   Neck: Normal range of motion. No JVD present. No tracheal deviation present.   Cardiovascular: Regular rhythm and intact distal pulses.  Tachycardia present.    Pulmonary/Chest: Effort normal. No stridor. No respiratory distress. She has no wheezes. She has no rales.   Abdominal: Soft. Bowel sounds are normal. She exhibits no distension. There is no tenderness. There is no guarding.   Musculoskeletal: She exhibits no edema.   Limited range of motion secondary to right hip pain, mild swelling noted; PICC line in place with mild erythema and tenderness   Neurological: She is alert and oriented to person, place, and time.   Skin: Skin is warm and dry.   Psychiatric: Her speech is normal and behavior is normal. Her mood appears anxious.   Vitals reviewed.      Fluids    Intake/Output Summary (Last 24 hours) at 06/04/19 0803  Last data filed at 06/03/19 1915   Gross per 24 hour   Intake              880 ml   Output                2 ml   Net              878 ml       Laboratory                        Imaging  DX-CHEST-LIMITED (1 VIEW)   Final Result      LEFT basilar  "underinflation atelectasis or pneumonia      CT-DRAIN-HEMATOMA - SEROMA   Final Result      CT-guided RIGHT pelvic fluid collection aspiration, laboratory evaluation pending         VB-CPOB-EJBPCRORA (WITH 1-VIEW CXR)    (Results Pending)        Assessment/Plan  * Abscess of right iliac muscle and right gluteus medius muscle- (present on admission)   Assessment & Plan    Recurrent issue since sacral fx repair in December 2018. Has had multiple IR drainage and completed antibx.  - S/P CT guided abscess drainage on 5/30; 2 mL removed  - Culture negative and therefore no antibiotics were continued  - ID and orthopedic surgery following  -Plan for hardware removal on 6/5     Sepsis (HCC)   Assessment & Plan    This is sepsis (without associated acute organ dysfunction).  Patient with new fever and tachycardia today.  From her retained hip hardware versus new infection.  Concern for PICC line infection  -Sepsis protocol initiated although hemodynamically stable no indication for initial fluid bolus at this time.  -CBC, CMP, INR, lactic acid pending  -Start NS at 75 cc an hour, x1 L  -Discussed case with ID, will start cefepime and vancomycin  -Remove PICC line once peripheral IV obtained  -Follow-up blood culture, urinalysis, chest x-ray    I examined the patient 6/4/2019 2:58 PM  Vital Signs:/67   Pulse (!) 120   Temp (!) 38.3 °C (101 °F) (Oral)   Resp 18   Ht 1.575 m (5' 2\")   Wt 59.2 kg (130 lb 8.2 oz)   SpO2 93%   Breastfeeding? No   BMI 23.87 kg/m²    Cardiac examination significant for Tachycardia  Pulmonary examination significant for Clear lung fileds  Capillary refill is brisk  Peripheral Pulse is 2+   Skin is normal     Hypomagnesemia- (present on admission)   Assessment & Plan    Repleted previously.  -Repeat level pending     RLS (restless legs syndrome)- (present on admission)   Assessment & Plan    Restarted home dose of pramipexole.  Tolerating well     Chronic pain- (present on admission) "   Assessment & Plan    Primarily located at left hip.  Plan for hardware removal tomorrow.  -Continue PRN oxycodone and flexeril.  -Morphine for breakthrough  -Lidocaine patches      History of DVT (deep vein thrombosis)- (present on admission)   Assessment & Plan    Currently on therapeutic Lovenox.  Previously on Xarelto prior to admission.  -Hold for hardware removal tomorrow     Hypercalcemia- (present on admission)   Assessment & Plan    Chronic. Continue sensipar.      Essential hypertension- (present on admission)   Assessment & Plan    Continues to be normotensive   - Continue home losartan, metoprolol, & amlodipine.      Chronic hyponatremia- (present on admission)   Assessment & Plan    Last level checked on 5/31, sodium was 134.  -Repeat level     History of breast cancer- (present on admission)   Assessment & Plan    S/p left mastectomy and breast implant  -Outpatient follow-up     COPD (chronic obstructive pulmonary disease) (HCC)- (present on admission)   Assessment & Plan    Stable. Doing well on room air. No respiratory distress/SOB. No evidence of exacerbation but will continue to monitor.           VTE prophylaxis: Lovenox

## 2019-06-04 NOTE — CARE PLAN
Problem: Skin Integrity  Goal: Risk for impaired skin integrity will decrease  Outcome: PROGRESSING AS EXPECTED  Pt educated on the importance of turning herself in bed. Pt verbalized understanding.     Problem: Pain Management  Goal: Pain level will decrease to patient's comfort goal  Outcome: PROGRESSING AS EXPECTED  Q2h pain assessments in place. Pt educated to verbalize to RN if intervention is ineffective pt verbalized understanding.

## 2019-06-04 NOTE — PROGRESS NOTES
"Assumed care of pt this am. Pt is A&O x3 (disoriented to time). Pt telling RN she needed to use the bathroom, RN attempting to assist pt to bed pan and pt refusing. When RN explains that I am trying to get her on a bed pan so she can use the bathroom pt says \"I don't need to use the bathroom I need to pee.\" pt falling asleep while talking to RN, pt educated to call RN if she wants to try to use the bed pan again, pt verbalized understanding. Bed alarm in use. Pt calls for assistance. Hourly rounding in place.  "

## 2019-06-04 NOTE — PROGRESS NOTES
Patient is being seen for pelvic abscess and possibly brain abscess.  Patient was evaluated by orthopedics.  She continues to remain in pain.  She will be taken to surgery for hardware removal on Wednesday.  Continue to hold the antibiotics.

## 2019-06-04 NOTE — PROGRESS NOTES
Pt aox 4 with forgetfulness. No visible signs of distress. VSS.  Tolerating medication regimen without any signs or symptoms of adverse reactions.  Pt reports 7 /10 pain, medicated per MAR.  Tolerating diet without any n/v. No new BM  Ambulatory max assist  L hip dressings are CDI.  On room air no complaints of SOB.  Bed locked and in low position.  Call light within reach and able to make all needs be known.  Will continue to monitor.

## 2019-06-05 ENCOUNTER — APPOINTMENT (OUTPATIENT)
Dept: RADIOLOGY | Facility: MEDICAL CENTER | Age: 69
DRG: 356 | End: 2019-06-05
Attending: ORTHOPAEDIC SURGERY
Payer: MEDICARE

## 2019-06-05 ENCOUNTER — ANESTHESIA (OUTPATIENT)
Dept: SURGERY | Facility: MEDICAL CENTER | Age: 69
DRG: 356 | End: 2019-06-05
Payer: MEDICARE

## 2019-06-05 ENCOUNTER — ANESTHESIA EVENT (OUTPATIENT)
Dept: SURGERY | Facility: MEDICAL CENTER | Age: 69
DRG: 356 | End: 2019-06-05
Payer: MEDICARE

## 2019-06-05 LAB
ANION GAP SERPL CALC-SCNC: 11 MMOL/L (ref 0–11.9)
BUN SERPL-MCNC: 14 MG/DL (ref 8–22)
CALCIUM SERPL-MCNC: 8.7 MG/DL (ref 8.5–10.5)
CHLORIDE SERPL-SCNC: 101 MMOL/L (ref 96–112)
CO2 SERPL-SCNC: 23 MMOL/L (ref 20–33)
CREAT SERPL-MCNC: 0.55 MG/DL (ref 0.5–1.4)
ERYTHROCYTE [DISTWIDTH] IN BLOOD BY AUTOMATED COUNT: 59.2 FL (ref 35.9–50)
GLUCOSE BLD-MCNC: 118 MG/DL (ref 65–99)
GLUCOSE BLD-MCNC: 95 MG/DL (ref 65–99)
GLUCOSE SERPL-MCNC: 77 MG/DL (ref 65–99)
HCT VFR BLD AUTO: 37 % (ref 37–47)
HGB BLD-MCNC: 12.3 G/DL (ref 12–16)
LACTATE BLD-SCNC: 1.2 MMOL/L (ref 0.5–2)
MAGNESIUM SERPL-MCNC: 1.1 MG/DL (ref 1.5–2.5)
MCH RBC QN AUTO: 28.5 PG (ref 27–33)
MCHC RBC AUTO-ENTMCNC: 33.2 G/DL (ref 33.6–35)
MCV RBC AUTO: 85.8 FL (ref 81.4–97.8)
PLATELET # BLD AUTO: 212 K/UL (ref 164–446)
PMV BLD AUTO: 9.9 FL (ref 9–12.9)
POTASSIUM SERPL-SCNC: 3.5 MMOL/L (ref 3.6–5.5)
RBC # BLD AUTO: 4.31 M/UL (ref 4.2–5.4)
SODIUM SERPL-SCNC: 135 MMOL/L (ref 135–145)
WBC # BLD AUTO: 13.7 K/UL (ref 4.8–10.8)

## 2019-06-05 PROCEDURE — 700102 HCHG RX REV CODE 250 W/ 637 OVERRIDE(OP): Performed by: INTERNAL MEDICINE

## 2019-06-05 PROCEDURE — 160031 HCHG SURGERY MINUTES - 1ST 30 MINS LEVEL 5: Performed by: ORTHOPAEDIC SURGERY

## 2019-06-05 PROCEDURE — 0SP734Z REMOVAL OF INTERNAL FIXATION DEVICE FROM RIGHT SACROILIAC JOINT, PERCUTANEOUS APPROACH: ICD-10-PCS | Performed by: ORTHOPAEDIC SURGERY

## 2019-06-05 PROCEDURE — 160009 HCHG ANES TIME/MIN: Performed by: ORTHOPAEDIC SURGERY

## 2019-06-05 PROCEDURE — 770020 HCHG ROOM/CARE - TELE (206)

## 2019-06-05 PROCEDURE — A6402 STERILE GAUZE <= 16 SQ IN: HCPCS | Performed by: ORTHOPAEDIC SURGERY

## 2019-06-05 PROCEDURE — 160002 HCHG RECOVERY MINUTES (STAT): Performed by: ORTHOPAEDIC SURGERY

## 2019-06-05 PROCEDURE — 36415 COLL VENOUS BLD VENIPUNCTURE: CPT

## 2019-06-05 PROCEDURE — 501838 HCHG SUTURE GENERAL: Performed by: ORTHOPAEDIC SURGERY

## 2019-06-05 PROCEDURE — 700101 HCHG RX REV CODE 250: Performed by: ANESTHESIOLOGY

## 2019-06-05 PROCEDURE — A9270 NON-COVERED ITEM OR SERVICE: HCPCS | Performed by: NURSE PRACTITIONER

## 2019-06-05 PROCEDURE — A9270 NON-COVERED ITEM OR SERVICE: HCPCS | Performed by: HOSPITALIST

## 2019-06-05 PROCEDURE — 160035 HCHG PACU - 1ST 60 MINS PHASE I: Performed by: ORTHOPAEDIC SURGERY

## 2019-06-05 PROCEDURE — 700111 HCHG RX REV CODE 636 W/ 250 OVERRIDE (IP): Performed by: NURSE PRACTITIONER

## 2019-06-05 PROCEDURE — 82962 GLUCOSE BLOOD TEST: CPT | Mod: 91

## 2019-06-05 PROCEDURE — 72170 X-RAY EXAM OF PELVIS: CPT

## 2019-06-05 PROCEDURE — 700111 HCHG RX REV CODE 636 W/ 250 OVERRIDE (IP): Performed by: ANESTHESIOLOGY

## 2019-06-05 PROCEDURE — 501445 HCHG STAPLER, SKIN DISP: Performed by: ORTHOPAEDIC SURGERY

## 2019-06-05 PROCEDURE — 700105 HCHG RX REV CODE 258: Performed by: ANESTHESIOLOGY

## 2019-06-05 PROCEDURE — 99232 SBSQ HOSP IP/OBS MODERATE 35: CPT | Performed by: INTERNAL MEDICINE

## 2019-06-05 PROCEDURE — 85027 COMPLETE CBC AUTOMATED: CPT

## 2019-06-05 PROCEDURE — 700102 HCHG RX REV CODE 250 W/ 637 OVERRIDE(OP): Performed by: NURSE PRACTITIONER

## 2019-06-05 PROCEDURE — 700105 HCHG RX REV CODE 258: Performed by: INTERNAL MEDICINE

## 2019-06-05 PROCEDURE — 160036 HCHG PACU - EA ADDL 30 MINS PHASE I: Performed by: ORTHOPAEDIC SURGERY

## 2019-06-05 PROCEDURE — 83735 ASSAY OF MAGNESIUM: CPT

## 2019-06-05 PROCEDURE — A6222 GAUZE <=16 IN NO W/SAL W/O B: HCPCS | Performed by: ORTHOPAEDIC SURGERY

## 2019-06-05 PROCEDURE — 80048 BASIC METABOLIC PNL TOTAL CA: CPT

## 2019-06-05 PROCEDURE — 700111 HCHG RX REV CODE 636 W/ 250 OVERRIDE (IP): Performed by: INTERNAL MEDICINE

## 2019-06-05 PROCEDURE — 160042 HCHG SURGERY MINUTES - EA ADDL 1 MIN LEVEL 5: Performed by: ORTHOPAEDIC SURGERY

## 2019-06-05 PROCEDURE — 700101 HCHG RX REV CODE 250: Performed by: NURSE PRACTITIONER

## 2019-06-05 PROCEDURE — 700102 HCHG RX REV CODE 250 W/ 637 OVERRIDE(OP): Performed by: HOSPITALIST

## 2019-06-05 PROCEDURE — A9270 NON-COVERED ITEM OR SERVICE: HCPCS | Performed by: INTERNAL MEDICINE

## 2019-06-05 PROCEDURE — 700111 HCHG RX REV CODE 636 W/ 250 OVERRIDE (IP): Performed by: HOSPITALIST

## 2019-06-05 PROCEDURE — 83605 ASSAY OF LACTIC ACID: CPT

## 2019-06-05 PROCEDURE — 500891 HCHG PACK, ORTHO MAJOR: Performed by: ORTHOPAEDIC SURGERY

## 2019-06-05 PROCEDURE — 700105 HCHG RX REV CODE 258

## 2019-06-05 PROCEDURE — 160048 HCHG OR STATISTICAL LEVEL 1-5: Performed by: ORTHOPAEDIC SURGERY

## 2019-06-05 RX ORDER — SODIUM CHLORIDE 9 MG/ML
INJECTION, SOLUTION INTRAVENOUS CONTINUOUS
Status: DISCONTINUED | OUTPATIENT
Start: 2019-06-05 | End: 2019-06-05

## 2019-06-05 RX ORDER — OXYCODONE HCL 5 MG/5 ML
10 SOLUTION, ORAL ORAL
Status: DISCONTINUED | OUTPATIENT
Start: 2019-06-05 | End: 2019-06-05 | Stop reason: HOSPADM

## 2019-06-05 RX ORDER — SODIUM CHLORIDE 9 MG/ML
INJECTION, SOLUTION INTRAVENOUS
Status: DISCONTINUED
Start: 2019-06-05 | End: 2019-06-06

## 2019-06-05 RX ORDER — MAGNESIUM SULFATE HEPTAHYDRATE 40 MG/ML
4 INJECTION, SOLUTION INTRAVENOUS ONCE
Status: COMPLETED | OUTPATIENT
Start: 2019-06-05 | End: 2019-06-05

## 2019-06-05 RX ORDER — SODIUM CHLORIDE, SODIUM LACTATE, POTASSIUM CHLORIDE, CALCIUM CHLORIDE 600; 310; 30; 20 MG/100ML; MG/100ML; MG/100ML; MG/100ML
INJECTION, SOLUTION INTRAVENOUS
Status: DISCONTINUED | OUTPATIENT
Start: 2019-06-05 | End: 2019-06-05 | Stop reason: SURG

## 2019-06-05 RX ORDER — OXYCODONE HCL 5 MG/5 ML
5 SOLUTION, ORAL ORAL
Status: DISCONTINUED | OUTPATIENT
Start: 2019-06-05 | End: 2019-06-05 | Stop reason: HOSPADM

## 2019-06-05 RX ORDER — DIPHENHYDRAMINE HYDROCHLORIDE 50 MG/ML
12.5 INJECTION INTRAMUSCULAR; INTRAVENOUS
Status: DISCONTINUED | OUTPATIENT
Start: 2019-06-05 | End: 2019-06-05 | Stop reason: HOSPADM

## 2019-06-05 RX ORDER — SODIUM CHLORIDE, SODIUM LACTATE, POTASSIUM CHLORIDE, CALCIUM CHLORIDE 600; 310; 30; 20 MG/100ML; MG/100ML; MG/100ML; MG/100ML
INJECTION, SOLUTION INTRAVENOUS CONTINUOUS
Status: DISCONTINUED | OUTPATIENT
Start: 2019-06-05 | End: 2019-06-05 | Stop reason: HOSPADM

## 2019-06-05 RX ORDER — SODIUM CHLORIDE 9 MG/ML
INJECTION, SOLUTION INTRAVENOUS
Status: COMPLETED
Start: 2019-06-05 | End: 2019-06-05

## 2019-06-05 RX ORDER — HALOPERIDOL 5 MG/ML
1 INJECTION INTRAMUSCULAR
Status: DISCONTINUED | OUTPATIENT
Start: 2019-06-05 | End: 2019-06-05 | Stop reason: HOSPADM

## 2019-06-05 RX ORDER — TRAMADOL HYDROCHLORIDE 50 MG/1
50 TABLET ORAL EVERY 6 HOURS PRN
Status: DISCONTINUED | OUTPATIENT
Start: 2019-06-05 | End: 2019-06-11

## 2019-06-05 RX ORDER — ONDANSETRON 2 MG/ML
4 INJECTION INTRAMUSCULAR; INTRAVENOUS
Status: DISCONTINUED | OUTPATIENT
Start: 2019-06-05 | End: 2019-06-05 | Stop reason: HOSPADM

## 2019-06-05 RX ADMIN — OMEPRAZOLE 20 MG: 20 CAPSULE, DELAYED RELEASE ORAL at 05:09

## 2019-06-05 RX ADMIN — TRAMADOL HYDROCHLORIDE 50 MG: 50 TABLET, FILM COATED ORAL at 21:52

## 2019-06-05 RX ADMIN — SODIUM CHLORIDE: 9 INJECTION, SOLUTION INTRAVENOUS at 11:12

## 2019-06-05 RX ADMIN — OXYCODONE HYDROCHLORIDE 10 MG: 10 TABLET ORAL at 01:21

## 2019-06-05 RX ADMIN — METOPROLOL TARTRATE 25 MG: 25 TABLET, FILM COATED ORAL at 17:26

## 2019-06-05 RX ADMIN — SENNOSIDES AND DOCUSATE SODIUM 2 TABLET: 8.6; 5 TABLET ORAL at 17:27

## 2019-06-05 RX ADMIN — FENTANYL CITRATE 100 MCG: 50 INJECTION, SOLUTION INTRAMUSCULAR; INTRAVENOUS at 07:44

## 2019-06-05 RX ADMIN — SENNOSIDES AND DOCUSATE SODIUM 2 TABLET: 8.6; 5 TABLET ORAL at 05:09

## 2019-06-05 RX ADMIN — EPHEDRINE SULFATE 10 MG: 50 INJECTION INTRAMUSCULAR; INTRAVENOUS; SUBCUTANEOUS at 08:25

## 2019-06-05 RX ADMIN — METOPROLOL TARTRATE 25 MG: 25 TABLET, FILM COATED ORAL at 05:09

## 2019-06-05 RX ADMIN — MORPHINE SULFATE 4 MG: 4 INJECTION INTRAVENOUS at 06:32

## 2019-06-05 RX ADMIN — TRAMADOL HYDROCHLORIDE 50 MG: 50 TABLET, FILM COATED ORAL at 15:09

## 2019-06-05 RX ADMIN — LIDOCAINE 2 PATCH: 50 PATCH CUTANEOUS at 11:01

## 2019-06-05 RX ADMIN — TEMAZEPAM 15 MG: 15 CAPSULE ORAL at 02:20

## 2019-06-05 RX ADMIN — ACETAMINOPHEN 650 MG: 325 TABLET, FILM COATED ORAL at 11:22

## 2019-06-05 RX ADMIN — VANCOMYCIN HYDROCHLORIDE 600 MG: 100 INJECTION, POWDER, LYOPHILIZED, FOR SOLUTION INTRAVENOUS at 22:30

## 2019-06-05 RX ADMIN — SODIUM CHLORIDE, POTASSIUM CHLORIDE, SODIUM LACTATE AND CALCIUM CHLORIDE: 600; 310; 30; 20 INJECTION, SOLUTION INTRAVENOUS at 07:44

## 2019-06-05 RX ADMIN — AMLODIPINE BESYLATE 10 MG: 5 TABLET ORAL at 05:09

## 2019-06-05 RX ADMIN — EPHEDRINE SULFATE 10 MG: 50 INJECTION INTRAMUSCULAR; INTRAVENOUS; SUBCUTANEOUS at 07:55

## 2019-06-05 RX ADMIN — ONDANSETRON 4 MG: 2 INJECTION INTRAMUSCULAR; INTRAVENOUS at 07:44

## 2019-06-05 RX ADMIN — LOSARTAN POTASSIUM 50 MG: 50 TABLET ORAL at 05:09

## 2019-06-05 RX ADMIN — MIDAZOLAM HYDROCHLORIDE 2 MG: 1 INJECTION, SOLUTION INTRAMUSCULAR; INTRAVENOUS at 07:44

## 2019-06-05 RX ADMIN — PROPOFOL 80 MG: 10 INJECTION, EMULSION INTRAVENOUS at 07:44

## 2019-06-05 RX ADMIN — ATORVASTATIN CALCIUM 10 MG: 10 TABLET, FILM COATED ORAL at 17:27

## 2019-06-05 RX ADMIN — PRAMIPEXOLE DIHYDROCHLORIDE 1 MG: 0.5 TABLET ORAL at 17:27

## 2019-06-05 RX ADMIN — CYCLOBENZAPRINE 10 MG: 10 TABLET, FILM COATED ORAL at 22:51

## 2019-06-05 RX ADMIN — CEFEPIME 2 G: 2 INJECTION, POWDER, FOR SOLUTION INTRAVENOUS at 17:26

## 2019-06-05 RX ADMIN — PRAMIPEXOLE DIHYDROCHLORIDE 1 MG: 0.5 TABLET ORAL at 11:01

## 2019-06-05 RX ADMIN — EPHEDRINE SULFATE 10 MG: 50 INJECTION INTRAMUSCULAR; INTRAVENOUS; SUBCUTANEOUS at 08:28

## 2019-06-05 RX ADMIN — CEFEPIME 2 G: 2 INJECTION, POWDER, FOR SOLUTION INTRAVENOUS at 05:06

## 2019-06-05 RX ADMIN — PRAMIPEXOLE DIHYDROCHLORIDE 1 MG: 0.5 TABLET ORAL at 05:09

## 2019-06-05 RX ADMIN — MAGNESIUM SULFATE IN WATER 4 G: 40 INJECTION, SOLUTION INTRAVENOUS at 11:02

## 2019-06-05 RX ADMIN — CINACALCET HYDROCHLORIDE 60 MG: 30 TABLET, COATED ORAL at 05:09

## 2019-06-05 ASSESSMENT — ENCOUNTER SYMPTOMS
MYALGIAS: 1
DIARRHEA: 0
BLURRED VISION: 0
SHORTNESS OF BREATH: 0
ABDOMINAL PAIN: 0
HEADACHES: 0
CONSTIPATION: 0
FEVER: 0
BLOOD IN STOOL: 0
DIZZINESS: 0
VOMITING: 0
DEPRESSION: 0

## 2019-06-05 ASSESSMENT — PAIN SCALES - GENERAL: PAIN_LEVEL: 0

## 2019-06-05 NOTE — PROGRESS NOTES
"Pharmacy Kinetics 68 y.o. female on vancomycin day # 1 2019    Currently on Vancomycin 1500 mg iv load, given @    Indication for Treatment: Unknown source of infection    Pertinent history per medical record: 69 yo female on hospital day 7 with PMH of cancer, COPD, DM, DLP, & HTN admitted on 2019 for abscess of iliac fossa.  Pt is trf from LTAC with persistent right gluteal abscess since sacral fx repair in Dec.  Has h/o multiple IR drainage.  ID is following.    Other antibiotics:   - Cefepime 2 g iv q12h    Allergies: Patient has no known allergies.     List concerns for renal function:   - Age  - Leukopenia (WBC from : 4.7); TMax 101 F, Tachycardia    Pertinent cultures to date:   -  Pelvic abscess: Neg    MRSA nares swab if pneumonia is a concern (ordered/positive/negative/n-a): n/a    No results for input(s): WBC, NEUTSPOLYS, BANDSSTABS in the last 72 hours.  No results for input(s): BUN, CREATININE, ALBUMIN in the last 72 hours.  No results for input(s): VANCOTROUGH, VANCOPEAK, VANCORANDOM in the last 72 hours.  Intake/Output Summary (Last 24 hours) at 19 0052  Last data filed at 19 1410   Gross per 24 hour   Intake              500 ml   Output                0 ml   Net              500 ml      /80   Pulse (!) 117   Temp 37.6 °C (99.6 °F) (Temporal)   Resp 19   Ht 1.575 m (5' 2\")   Wt 56.8 kg (125 lb 3.5 oz)   SpO2 90%  Temp (24hrs), Av.7 °C (99.8 °F), Min:37.1 °C (98.8 °F), Max:38.3 °C (101 °F)      A/P   1. Vancomycin dose change: 1100 mg iv q24h, start @  2. Next vancomycin level: Prior to the 3rd dose (not ordered)  3. Goal trough: 12 to 16 mcg/mL  4. Comments: Limited concern for renal accumulation, so maintenance dose to start at 20 mg/kg iv q24h.  Trough to be assessed at steady state.  Pharmacy to follow and to make dose adjustments as appropriate.      Bomo Desai, PharmD        "

## 2019-06-05 NOTE — OR NURSING
2 RN consent done with pts brother René via telephone. Pt intermittently confused. Emerita RN and PATRICK Thomas for 2 RN consent.

## 2019-06-05 NOTE — PROGRESS NOTES
Assumed care of patient and bedside report received from previous shift. Plan of care discussed with patient. All concerns voiced at this time. Patient is alert and oriented x 3. Patient now to OR for surgery.

## 2019-06-05 NOTE — ANESTHESIA PROCEDURE NOTES
Airway  Date/Time: 6/5/2019 8:00 AM  Performed by: RITA PROCTOR  Authorized by: RITA PROCTOR     Location:  OR  Urgency:  Elective  Indications for Airway Management:  Anesthesia  Spontaneous Ventilation: absent    Sedation Level:  Deep  Preoxygenated: Yes    Final Airway Type:  Supraglottic airway  Final Supraglottic Airway:  Standard LMA  SGA Size:  3  Number of Attempts at Approach:  1

## 2019-06-05 NOTE — OR NURSING
Called report to PATRICK Jonas. Pt os AO to self, no complaints of pain. VSS. Right lateral hip surgical site is CDI, with ice pack in place. Will put pt on transport list to go back to [facility]

## 2019-06-05 NOTE — CARE PLAN
Problem: Communication  Goal: The ability to communicate needs accurately and effectively will improve  Patient educated to utilize call light. Patient encouraged to ask questions about plan of care. Patient effectively uses call light and is involved in POC.    Problem: Safety  Goal: Will remain free from falls  Patient's risk for injury and falls assessed. Appropriate safety precautions in place. Patient educated to utilize call light for needs. Patient verbalizes understanding.

## 2019-06-05 NOTE — ANESTHESIA PREPROCEDURE EVALUATION
Relevant Problems   (+) COPD (chronic obstructive pulmonary disease) (HCC)   (+) Diabetes mellitus type 2 in obese (HCC)   (+) Essential hypertension   (+) GERD (gastroesophageal reflux disease)       Physical Exam    Airway   Mallampati: II  TM distance: >3 FB  Neck ROM: full       Cardiovascular - normal exam  Rhythm: regular  Rate: normal  (-) murmur     Dental - normal exam         Pulmonary - normal exam  Breath sounds clear to auscultation     Abdominal    Neurological - normal exam                 Anesthesia Plan    ASA 2       Plan - general             Induction: intravenous    Postoperative Plan: Postoperative administration of opioids is intended.    Pertinent diagnostic labs and testing reviewed    Informed Consent:    Anesthetic plan and risks discussed with patient.    Use of blood products discussed with: patient whom consented to blood products.

## 2019-06-05 NOTE — PROGRESS NOTES
2 RN skin check complete with PATRICK King.  Devices in place: PIV.  Skin assessed under devices: Yes.  Confirmed pressure ulcers found on: N/A  New potential pressure ulcer noted on: N/A  The following interventions in place: patient turns self from side to side, pillows in use for support/positioning    Discoloration on left shin  Boggy right heel  Scarring to bilateral breasts

## 2019-06-05 NOTE — PROGRESS NOTES
Patient is back from surgery. Report received from PATRICK Correa. Patient oriented x3 (disoriented to date/time) but seems confused. No complaints of pain, vitals stable. Right lateral hip surgical site is CDI with ice pack in place. Bed locked and in lowest position, call light with patient, will continue to monitor.

## 2019-06-05 NOTE — PROGRESS NOTES
"Pharmacy Kinetics 68 y.o. female on vancomycin day # 2 2019    Currently on Vancomycin 600 mg iv q24hr    Indication for Treatment: SSTI vs brain abscess    Pertinent history per medical record: Admitted on 2019 for pelvic abscess. Patient was recently admitted to [facility] from 3/8- for fever. She was found to have possible brain abscess as well as iliopsoas abscess. ID was consulted during that admission and she was treated with meropenem and vancomycin, with plans for 6 weeks of antibiotics. She eventually transferred to LTAC for completion of antibiotics. CT scan on  again demonstrated pelvic abscess, prompting return to [facility] for I&D, which was performed with IR on . She had removal of possibly infected hardware on . MRI showed increase in the number of innumerable small ovoid enhancing lesions throughout the brain parenchyma (possible brain abscess vs metastatic lesions). ID is following.     Other antibiotics: cefepime 2 g IV q8h    Allergies: Patient has no known allergies.     List concerns for renal function: age    Pertinent cultures to date:    PBC x 2: NGTD   pelvic abscess: negative   MRSA nares: negative    Recent Labs      19   0348   WBC  13.7*     Recent Labs      19   0348   BUN  14   CREATININE  0.55     /69   Pulse (!) 120   Temp 36.9 °C (98.4 °F) (Temporal)   Resp 18   Ht 1.575 m (5' 2\")   Wt 56.8 kg (125 lb 3.5 oz)   SpO2 95%  Temp (24hrs), Av.4 °C (99.3 °F), Min:36.6 °C (97.9 °F), Max:38.3 °C (101 °F)      A/P   1. Vancomycin dose change: continue current dose  2. Next vancomycin level: ~2 days (not yet ordered)  3. Goal trough: ~18 mcg/mL  4. Comments: dosing with vancomycin continues. Per dosing history, patient did not tolerate dosing per protocol. She was therapeutic on her previous dose of 600 mg q24h. Given this history, will continue with 600 mg daily dose. Aiming for higher goal trough given possibility of brain abscess. " Hardware removed yesterday and patient did have a fever yesterday afternoon. No changes to current treatment. Will follow.    Chula Arias, BariD

## 2019-06-05 NOTE — OR NURSING
Called pt's brother, René, for an update on pt's status. [place] he is in the [place] area right now. He will come to visit tomorrow.  Pt is sleeping, she is doing well. VSS. No signs of distress. Dressing is CDI with an ice pack on.

## 2019-06-05 NOTE — ANESTHESIA POSTPROCEDURE EVALUATION
Patient: Muriel Martini    Procedure Summary     Date:  06/05/19 Room / Location:  [place] [address] / SURGERY [facility]    Anesthesia Start:  0744 Anesthesia Stop:  0835    Procedure:  INSERTION, SCREW, SACROILIAC JOINT- FOR REMOVAL (Right Hip) Diagnosis:  (infected hardware)    Surgeon:  Cody Porter M.D. Responsible Provider:  Geovany Barboza M.D.    Anesthesia Type:  general ASA Status:  2          Final Anesthesia Type: general  Last vitals  BP   Blood Pressure : 132/61, NIBP: 108/65    Temp   36.9 °C (98.4 °F)    Pulse   Pulse: (!) 111, Heart Rate (Monitored): (!) 105   Resp   18    SpO2   97 %      Anesthesia Post Evaluation    Patient location during evaluation: PACU  Patient participation: complete - patient participated  Level of consciousness: awake and alert  Pain score: 0    Airway patency: patent  Anesthetic complications: no  Cardiovascular status: hemodynamically stable  Respiratory status: acceptable  Hydration status: euvolemic    PONV: none           Nurse Pain Score: 0 (NPRS)

## 2019-06-05 NOTE — ANESTHESIA QCDR
2019 [facility] Clinical Data Registry (for Quality Improvement)     Postoperative nausea/vomiting risk protocol (Adult = 18 yrs and Pediatric 3-17 yrs)- (430 and 463)  General inhalation anesthetic (NOT TIVA) with PONV risk factors: Yes  Provision of anti-emetic therapy with at least 2 different classes of agents: Yes   Patient DID NOT receive anti-emetic therapy and reason is documented in Medical Record:  N/A    Multimodal Pain Management- (AQI59)  Patient undergoing Elective Surgery (i.e. Outpatient, or ASC, or Prescheduled Surgery prior to Hospital Admission): No  Use of Multimodal Pain Management, two or more drugs and/or interventions, NOT including systemic opioids: N/A  Exception: Documented allergy to multiple classes of analgesics: N/A    PACU assessment of acute postoperative pain prior to Anesthesia Care End- Applies to Patients Age = 18- (ABG7)  Initial PACU pain score is which of the following: < 7/10  Patient unable to report pain score: N/A    Post-anesthetic transfer of care checklist/protocol to PACU/ICU- (426 and 427)  Upon conclusion of case, patient transferred to which of the following locations: PACU/Non-ICU  Use of transfer checklist/protocol: Yes  Exclusion: Service Performed in Patient Hospital Room (and thus did not require transfer): N/A    PACU Reintubation- (AQI31)  General anesthesia requiring endotracheal intubation (ETT) along with subsequent extubation in OR or PACU: No  Required reintubation in the PACU: N/A  Extubation was a planned trial documented in the medical record prior to removal of the original airway device: N/A    Unplanned admission to ICU related to anesthesia service up through end of PACU care- (MD51)  Unplanned admission to ICU (not initially anticipated at anesthesia start time): No

## 2019-06-05 NOTE — OP REPORT
DATE OF SERVICE:  06/05/2019    PREOPERATIVE DIAGNOSIS:  Possibly infected hardware, right pelvis.    POSTOPERATIVE DIAGNOSIS:  Possibly infected hardware, right pelvis.    PROCEDURE PERFORMED:  Hardware removal, pelvis.    SURGEON:  Cody Norman MD    ASSISTANT:  Boom Villarreal DO    ESTIMATED BLOOD LOSS:  None.    INDICATIONS:  This is a 68-year-old female who is over 6 months status post   percutaneous fixation of a pelvic ring injury with sacroiliac screws who has   developed unrelated pelvic abscesses.  There was a thought that some of these   abscesses may communicate with her hardware and the infectious disease service   would like to have these screws removed.  Risks and benefits were discussed   with the patient, which include but not limited to bleeding, infection,   neurovascular damage, pain, stiffness, malunion, intrasacral hardware and need   for further surgery.  She understands all these risks and wished to proceed.    DESCRIPTION OF PROCEDURE:  The patient was sedated with LMA anesthesia.  The   right hip was prepped and draped in the usual sterile fashion.  The sacroiliac   screws and washers were removed without difficulty.  Wounds were irrigated,   closed with staples.  Sterile dressings were applied.  The patient tolerated   the procedure well.    POSTOPERATIVE PLAN:  The patient will be weightbearing as tolerated and   receive antibiotics per the infectious disease service for other infections.       ____________________________________     CODY NORMAN MD [facility] / NTS    DD:  06/05/2019 08:45:52  DT:  06/05/2019 09:42:40    D#:  [id number]  Job#:  [id number]

## 2019-06-05 NOTE — ANESTHESIA TIME REPORT
Anesthesia Start and Stop Event Times     Date Time Event    6/5/2019 0744 Anesthesia Start     0835 Anesthesia Stop        Responsible Staff  06/05/19    Name Role Begin End    Geovany Barboza M.D. Anesth 0744 0869        Preop Diagnosis (Free Text):  Pre-op Diagnosis     Right sacroilliac screw removal         Preop Diagnosis (Codes):  Diagnosis Information     Diagnosis Code(s):         Post op Diagnosis  Abscess of iliac fossa      Premium Reason  Non-Premium    Comments:

## 2019-06-05 NOTE — PROGRESS NOTES
Assumed care of patient and bedside report received from previous RN Mare. Plan of care discussed with patient. All concerns voiced at this time. Patient is alert and oriented x 3 (disoriented to date and time). Patient educated on importance of calling, bed controls on, bed locked and in lowest position, call light with patient.

## 2019-06-06 LAB
C DIFF DNA SPEC QL NAA+PROBE: NEGATIVE
C DIFF TOX GENS STL QL NAA+PROBE: NEGATIVE
GLUCOSE BLD-MCNC: 82 MG/DL (ref 65–99)
GLUCOSE BLD-MCNC: 83 MG/DL (ref 65–99)
GLUCOSE BLD-MCNC: 97 MG/DL (ref 65–99)
VANCOMYCIN TROUGH SERPL-MCNC: 14.4 UG/ML (ref 10–20)

## 2019-06-06 PROCEDURE — A9270 NON-COVERED ITEM OR SERVICE: HCPCS | Performed by: HOSPITALIST

## 2019-06-06 PROCEDURE — 770020 HCHG ROOM/CARE - TELE (206)

## 2019-06-06 PROCEDURE — A9270 NON-COVERED ITEM OR SERVICE: HCPCS | Performed by: INTERNAL MEDICINE

## 2019-06-06 PROCEDURE — 80202 ASSAY OF VANCOMYCIN: CPT

## 2019-06-06 PROCEDURE — 700111 HCHG RX REV CODE 636 W/ 250 OVERRIDE (IP): Performed by: INTERNAL MEDICINE

## 2019-06-06 PROCEDURE — 700102 HCHG RX REV CODE 250 W/ 637 OVERRIDE(OP): Performed by: NURSE PRACTITIONER

## 2019-06-06 PROCEDURE — 700105 HCHG RX REV CODE 258: Performed by: INTERNAL MEDICINE

## 2019-06-06 PROCEDURE — 99233 SBSQ HOSP IP/OBS HIGH 50: CPT | Performed by: INTERNAL MEDICINE

## 2019-06-06 PROCEDURE — 700102 HCHG RX REV CODE 250 W/ 637 OVERRIDE(OP): Performed by: INTERNAL MEDICINE

## 2019-06-06 PROCEDURE — 87493 C DIFF AMPLIFIED PROBE: CPT

## 2019-06-06 PROCEDURE — 82962 GLUCOSE BLOOD TEST: CPT

## 2019-06-06 PROCEDURE — 700102 HCHG RX REV CODE 250 W/ 637 OVERRIDE(OP): Performed by: HOSPITALIST

## 2019-06-06 PROCEDURE — 99231 SBSQ HOSP IP/OBS SF/LOW 25: CPT | Performed by: INTERNAL MEDICINE

## 2019-06-06 PROCEDURE — 36415 COLL VENOUS BLD VENIPUNCTURE: CPT

## 2019-06-06 PROCEDURE — A9270 NON-COVERED ITEM OR SERVICE: HCPCS | Performed by: NURSE PRACTITIONER

## 2019-06-06 PROCEDURE — 700101 HCHG RX REV CODE 250: Performed by: NURSE PRACTITIONER

## 2019-06-06 RX ADMIN — ENOXAPARIN SODIUM 40 MG: 100 INJECTION SUBCUTANEOUS at 17:48

## 2019-06-06 RX ADMIN — TRAMADOL HYDROCHLORIDE 50 MG: 50 TABLET, FILM COATED ORAL at 06:38

## 2019-06-06 RX ADMIN — LIDOCAINE 2 PATCH: 50 PATCH CUTANEOUS at 12:23

## 2019-06-06 RX ADMIN — CEFEPIME 2 G: 2 INJECTION, POWDER, FOR SOLUTION INTRAVENOUS at 17:45

## 2019-06-06 RX ADMIN — SENNOSIDES AND DOCUSATE SODIUM 2 TABLET: 8.6; 5 TABLET ORAL at 05:02

## 2019-06-06 RX ADMIN — PRAMIPEXOLE DIHYDROCHLORIDE 1 MG: 0.5 TABLET ORAL at 17:50

## 2019-06-06 RX ADMIN — VANCOMYCIN HYDROCHLORIDE 600 MG: 100 INJECTION, POWDER, LYOPHILIZED, FOR SOLUTION INTRAVENOUS at 22:19

## 2019-06-06 RX ADMIN — ATORVASTATIN CALCIUM 10 MG: 10 TABLET, FILM COATED ORAL at 17:51

## 2019-06-06 RX ADMIN — LOSARTAN POTASSIUM 50 MG: 50 TABLET ORAL at 05:02

## 2019-06-06 RX ADMIN — CYCLOBENZAPRINE 10 MG: 10 TABLET, FILM COATED ORAL at 20:31

## 2019-06-06 RX ADMIN — PRAMIPEXOLE DIHYDROCHLORIDE 1 MG: 0.5 TABLET ORAL at 12:27

## 2019-06-06 RX ADMIN — OMEPRAZOLE 20 MG: 20 CAPSULE, DELAYED RELEASE ORAL at 05:02

## 2019-06-06 RX ADMIN — CINACALCET HYDROCHLORIDE 60 MG: 30 TABLET, COATED ORAL at 05:04

## 2019-06-06 RX ADMIN — CEFEPIME 2 G: 2 INJECTION, POWDER, FOR SOLUTION INTRAVENOUS at 05:04

## 2019-06-06 RX ADMIN — AMLODIPINE BESYLATE 10 MG: 5 TABLET ORAL at 05:02

## 2019-06-06 RX ADMIN — OXYCODONE HYDROCHLORIDE 10 MG: 10 TABLET ORAL at 17:53

## 2019-06-06 RX ADMIN — METOPROLOL TARTRATE 25 MG: 25 TABLET, FILM COATED ORAL at 05:02

## 2019-06-06 RX ADMIN — METOPROLOL TARTRATE 25 MG: 25 TABLET, FILM COATED ORAL at 17:50

## 2019-06-06 RX ADMIN — PRAMIPEXOLE DIHYDROCHLORIDE 1 MG: 0.5 TABLET ORAL at 05:02

## 2019-06-06 ASSESSMENT — ENCOUNTER SYMPTOMS
BLOOD IN STOOL: 0
DEPRESSION: 0
FEVER: 0
VOMITING: 0
DIARRHEA: 0
SHORTNESS OF BREATH: 0
HEADACHES: 0
DIZZINESS: 0
MYALGIAS: 1
BLURRED VISION: 0
CONSTIPATION: 0
ABDOMINAL PAIN: 0

## 2019-06-06 NOTE — CARE PLAN
Problem: Communication  Goal: The ability to communicate needs accurately and effectively will improve  Outcome: PROGRESSING AS EXPECTED    Intervention: [place] patient and significant other/support system to call light to alert staff of needs  Patient oriented to call light system and has been able to communicate needs accurately and effectively.      Problem: Safety  Goal: Will remain free from falls  Outcome: PROGRESSING AS EXPECTED    Intervention: Assess risk factors for falls  Patient has been assessed for fall risks and fall precautions have been put in place.

## 2019-06-06 NOTE — PROGRESS NOTES
Hospital Medicine Daily Progress Note    Date of Service  6/5/2019    Chief Complaint  68 y.o. female admitted 5/29/2019 with persistent abscess.    Hospital Course     Ms. Martini is a 68-year-old female who was transferred [facility] on 5[facility]29/2019 with persistent right gluteal abscesses since sacral fracture repair in December.  She has had multiple IR drainage.  She initially presented with pelvic pain, low back pain, and confusion. She also had new slurred speech. Imaging of the abdomen, pelvis and brain revealed abscesses and she was treated with a full course of IV antibiotics per ID.  Her slurred speech improved and she was able to ambulate with physical therapy. However, repeat imaging showed persistent pelvic abscesses.  MRI brain shows increase in brain lesions.  Her brain lesions are known from prior imaging and there is concern they represent metastasis.  Oncology aware and do not recommend further imaging. Cultures remained negative and a CHAS done by Dr. Potter showed no vegetations. The size of the pelvic abscesses was reviewed by interventional radiology and the case was discussed with Dr. Finley who recommended transfer to [facility] for CT guided drainage.  S/P drainage on 5/30.    Plan for hardware removal today 6/5.       Interval Problem Update  Hardware removed this AM.    Consultants/Specialty  Infectious disease  Orthopedic surgery    Code Status  DNR/DNI    Disposition  Anticipate SNF need.    Review of Systems  Review of Systems   Constitutional: Negative for fever.   HENT: Negative for ear pain.    Eyes: Negative for blurred vision.   Respiratory: Negative for shortness of breath.    Cardiovascular: Negative for chest pain.   Gastrointestinal: Negative for abdominal pain, blood in stool, constipation, diarrhea, melena and vomiting.   Genitourinary: Negative for dysuria, hematuria and urgency.   Musculoskeletal: Positive for joint pain and myalgias.   Skin: Negative for rash.   Neurological:  Negative for dizziness and headaches.   Psychiatric/Behavioral: Negative for depression.        Physical Exam  Temp:  [36.6 °C (97.9 °F)-37.8 °C (100 °F)] 36.7 °C (98 °F)  Pulse:  [100-120] 105  Resp:  [16-30] 16  BP: ()/() 111/59  SpO2:  [90 %-100 %] 93 %    Physical Exam   Constitutional: She is oriented to person, place, and time. She appears well-developed and well-nourished. No distress.   HENT:   Head: Normocephalic and atraumatic.   Eyes: Conjunctivae are normal. No scleral icterus.   Neck: Neck supple.   Cardiovascular: Normal rate, regular rhythm and normal heart sounds.  Exam reveals no gallop and no friction rub.    No murmur heard.  Pulmonary/Chest: Effort normal and breath sounds normal. No respiratory distress. She has no wheezes. She has no rales.   Abdominal: Soft. Bowel sounds are normal. She exhibits no distension and no mass. There is no tenderness. There is no rebound and no guarding.   Musculoskeletal: She exhibits edema. She exhibits no tenderness.   Right hip slightly swollen.  Surgical dressing C/D/I.   Neurological: She is alert and oriented to person, place, and time.   Skin: Skin is warm and dry. She is not diaphoretic.   Psychiatric: She has a normal mood and affect. Her behavior is normal. Thought content normal.   Nursing note and vitals reviewed.      Fluids    Intake/Output Summary (Last 24 hours) at 06/05/19 2232  Last data filed at 06/05/19 2100   Gross per 24 hour   Intake              700 ml   Output              300 ml   Net              400 ml       Laboratory  Recent Labs      06/05/19   0348   WBC  13.7*   RBC  4.31   HEMOGLOBIN  12.3   HEMATOCRIT  37.0   MCV  85.8   MCH  28.5   MCHC  33.2*   RDW  59.2*   PLATELETCT  212   MPV  9.9     Recent Labs      06/05/19   0348   SODIUM  135   POTASSIUM  3.5*   CHLORIDE  101   CO2  23   GLUCOSE  77   BUN  14   CREATININE  0.55   CALCIUM  8.7                   Imaging  DX-PORTABLE FLUOROSCOPY < 1 HOUR   Final Result          Portable fluoroscopy utilized for 2 minutes.      DX-PELVIS-1 OR 2 VIEWS   Final Result      Status post hardware removal from the right SI joint      DX-CHEST-PORTABLE (1 VIEW)   Final Result      Interstitial prominence could be due to pulmonary edema or hypoventilatory change.      DX-CHEST-LIMITED (1 VIEW)   Final Result      LEFT basilar underinflation atelectasis or pneumonia      CT-DRAIN-HEMATOMA - SEROMA   Final Result      CT-guided RIGHT pelvic fluid collection aspiration, laboratory evaluation pending              Assessment/Plan  * Abscess of right iliac muscle and right gluteus medius muscle- (present on admission)   Assessment & Plan    Recurrent issue since sacral fx repair in December 2018. Has had multiple IR drainage and completed antibx.  - S/P CT guided abscess drainage on 5/30; 2 mL removed  - Culture negative and therefore no antibiotics were continued  - ID and orthopedic surgery following  - hardware removal on 6/5     Sepsis (HCC)   Assessment & Plan    This is sepsis (without associated acute organ dysfunction).  Patient with new fever and tachycardia today.  From her retained hip hardware versus new infection.  Concern for PICC line infection  -Sepsis protocol initiated although hemodynamically stable no indication for initial fluid bolus at this time.  -CBC, CMP, INR, lactic acid pending  -Start NS at 75 cc an hour, x1 L  -Discussed case with ID, will start cefepime and vancomycin  -Remove PICC line once peripheral IV obtained  -Follow-up blood culture, urinalysis, chest x-ray         Hypomagnesemia- (present on admission)   Assessment & Plan    Repleted previously.  Still critically low, 1.1 this morning.  4mg magnesium sulfate ordered x1 STAT     RLS (restless legs syndrome)- (present on admission)   Assessment & Plan    Restarted home dose of pramipexole.  Tolerating well     Chronic pain- (present on admission)   Assessment & Plan    Primarily located at left hip.  -Continue PRN  oxycodone and flexeril.  -Morphine for breakthrough  -Lidocaine patches      History of DVT (deep vein thrombosis)- (present on admission)   Assessment & Plan    Lovenox held for surgery today.  Previously on Xarelto prior to admission.  Restart anticoagulation when OK by Ortho.  Likely tomorrow.       Hypercalcemia- (present on admission)   Assessment & Plan    Chronic. Continue sensipar.      Essential hypertension- (present on admission)   Assessment & Plan    Continues to be normotensive   - Continue home losartan, metoprolol, & amlodipine.      Chronic hyponatremia- (present on admission)   Assessment & Plan    Last level checked on 5/31, sodium was 134.  -Repeat level     History of breast cancer- (present on admission)   Assessment & Plan    S/p left mastectomy and breast implant  -Outpatient follow-up     COPD (chronic obstructive pulmonary disease) (HCC)- (present on admission)   Assessment & Plan    Stable. Doing well on room air. No respiratory distress/SOB. No evidence of exacerbation but will continue to monitor.           VTE prophylaxis: SCD

## 2019-06-06 NOTE — PROGRESS NOTES
Assumed care from Renata NGUYEN. Received bedside report.  Updated patient on daily plan of care on white board. Patient denies any additional needs at this time.  Patient belongings and call light with in reach.  Vitals stable. Bed alarm on and working appropriately.  Will continue to monitor.

## 2019-06-06 NOTE — PROGRESS NOTES
Spiritual Care Note    Patient Information     Patient's Name: Muriel Martini   MRN: [id number]    YOB: 1950   Age and Gender: 68 y.o. female   Service Area:    Room (and Bed): [place] [address]   Ethnicity or Nationality: White    Primary Language: English   Yazidism/Spiritual preference: Other [place]/[employer]   Place of Residence:    Family/Friends/Others Present:    Clinical Team Present:    Medical Diagnosis(-es)/Procedure(s):    Code Status: DNAR/DNI    Date of Admission: 5/29/2019   Length of Stay: 8 days        Spiritual Care Provider Information:  Name of Spiritual Care Provider: Rupert  Title of Spiritual Care Provider:   Phone Number: [phone]  E-mail: [email or web address]  Total time : 15 minutes    Spiritual Screen Results:    Gen Nursing  Spiritual Screen  Is your spiritual health or inner well-being important to you as you cope with your medical condition?: Yes  Would you like to receive a visit from our Spiritual Care team or your own Buddhist or spiritual leader?: Yes  Was spiritual care education provided to the patient?: Declined  Sources of Hope/Dominique: Ceremony/Ritual, Family     Palliative Care  PC Yazidism/Spiritual Screening  Was spiritual care education provided to the patient?: Declined      Encounter/Request Information  Encounter/Request Type   Visited With: Patient  Nature of the Visit: Follow-up, On shift  Continue Visiting: Yes  General Visit: Yes  Referral From/ Origin of Request: Epic nursing    Religous Needs/Values  Yazidism Needs Visit  Yazidism Needs: Prayer  Ritual Needs Visit  Ritual Needs: Communion    Spiritual Assessment   Spiritual Care Encounters  Observations/Symptoms: Thankfulness  Interacton/Conversation:  (Patient wanted prayer for surgery being done today.)  Assessment: Need  Need: Corinna Issues  Intended Effects: Demonstrate Caring and Concern  Outcomes: Coping  Plan: Visit Upon Request    Notes:

## 2019-06-06 NOTE — CARE PLAN
Problem: Nutritional:  Goal: Achieve adequate nutritional intake  Start on PO diet and Patient will consume >50% of meals   Outcome: PROGRESSING AS EXPECTED  PO intake improving. 0-25% on (6/3), % on (6/5). Pt requested Boost GC for late night or early morning snack. Added Boost BID. Snacks BID already in place. RD following.

## 2019-06-06 NOTE — PROGRESS NOTES
"Pharmacy Kinetics 68 y.o. female on vancomycin day # 3 2019    Currently on Vancomycin 600 mg iv q24hr    Indication for Treatment: SSTI, possible brain abscess     Pertinent history per medical record: Admitted on 2019 for pelvic abscess. Patient was recently admitted to [facility] from 3/8- for fever. She was found to have possible brain abscess as well as iliopsoas abscess. ID was consulted during that admission and she was treated with meropenem and vancomycin, with plans for 6 weeks of antibiotics. She eventually transferred to [facility] for completion of antibiotics. CT scan on  again demonstrated pelvic abscess, prompting return to [facility] for I&D, which was performed with IR on . She had removal of possibly infected hardware on . MRI showed increase in the number of innumerable small ovoid enhancing lesions throughout the brain parenchyma (possible brain abscess vs metastatic lesions). ID is following.      Other antibiotics: cefepime 2 g IV q8h     Allergies: Patient has no known allergies.      List concerns for renal function: age     Pertinent cultures to date:    PBC x 2: NGTD   pelvic abscess: negative   MRSA nares: negative    Recent Labs      19   0348   WBC  13.7*     Recent Labs      19   0348   BUN  14   CREATININE  0.55     /62   Pulse 80   Temp 36.6 °C (97.9 °F) (Temporal)   Resp 17   Ht 1.575 m (5' 2\")   Wt 61.3 kg (135 lb 2.3 oz)   SpO2 96%  Temp (24hrs), Av.8 °C (98.2 °F), Min:36.6 °C (97.9 °F), Max:37.1 °C (98.7 °F)      A/P   1. Vancomycin dose change: not indicated at this time  2. Next vancomycin level:  @ 2130 (prior to 3rd total dose)  3. Goal trough: ~18 mcg/mL  4. Comments: dosing with vancomycin continues. Patient on dose lower than protocol due to supratherapeutic levels on previous admission. Will check a level tonight prior to the 3rd total dose to ensure not accumulating too quickly. Renal indices appropriate. ID following. Will " follow for plan of care.     Chula Arias, PharmD

## 2019-06-06 NOTE — CARE PLAN
Problem: Nutritional:  Goal: Achieve adequate nutritional intake  Start on PO diet and Patient will consume >50% of meals   Outcome: PROGRESSING AS EXPECTED  PO intake variable 0-75% last 3 days. Boost GC TID with meals. RD will continue to monitor.

## 2019-06-06 NOTE — PROGRESS NOTES
Hospital Medicine Daily Progress Note    Date of Service  6/6/2019    Chief Complaint  68 y.o. female admitted 5/29/2019 with persistent abscess.    Hospital Course     Ms. Martini is a 68-year-old female who was [facility] on 5[facility]29/2019 with persistent right gluteal abscesses since sacral fracture repair in December.  She has had multiple IR drainage.  She initially presented with pelvic pain, low back pain, and confusion. She also had new slurred speech. Imaging of the abdomen, pelvis and brain revealed abscesses and she was treated with a full course of IV antibiotics per ID.  Her slurred speech improved and she was able to ambulate with physical therapy. However, repeat imaging showed persistent pelvic abscesses.  MRI brain shows increase in brain lesions.  Her brain lesions are known from prior imaging and there is concern they represent metastasis.  Oncology aware and do not recommend further imaging. Cultures remained negative and a HCAS done by Dr. Potter showed no vegetations. The size of the pelvic abscesses was reviewed by interventional radiology and the case was discussed with Dr. Finley who recommended transfer to [facility] for CT guided drainage.  S/P drainage on 5/30.    hardware removal 6/5.       Interval Problem Update  Having 2 loose watery bowel movement this morning.    Consultants/Specialty  Infectious disease  Orthopedic surgery    Code Status  DNR/DNI    Disposition  Anticipate SNF need.    Review of Systems  Review of Systems   Constitutional: Negative for fever.   HENT: Negative for ear pain.    Eyes: Negative for blurred vision.   Respiratory: Negative for shortness of breath.    Cardiovascular: Negative for chest pain.   Gastrointestinal: Negative for abdominal pain, blood in stool, constipation, diarrhea, melena and vomiting.   Genitourinary: Negative for dysuria, hematuria and urgency.   Musculoskeletal: Positive for joint pain and myalgias.   Skin: Negative for rash.    Neurological: Negative for dizziness and headaches.   Psychiatric/Behavioral: Negative for depression.        Physical Exam  Temp:  [36.6 °C (97.8 °F)-37.1 °C (98.7 °F)] 36.6 °C (97.8 °F)  Pulse:  [] 90  Resp:  [16-18] 18  BP: (111-128)/() 121/64  SpO2:  [92 %-98 %] 94 %    I performed the physical exam today, 06/06/19, and compared to yesterday, 06/05/2019, no changes except for that noted below:  Physical Exam   Constitutional: She is oriented to person, place, and time. She appears well-developed and well-nourished. No distress.   HENT:   Head: Normocephalic and atraumatic.   Eyes: Conjunctivae are normal. No scleral icterus.   Neck: Neck supple.   Cardiovascular: Normal rate, regular rhythm and normal heart sounds.  Exam reveals no gallop and no friction rub.    No murmur heard.  Pulmonary/Chest: Effort normal and breath sounds normal. No respiratory distress. She has no wheezes. She has no rales.   Abdominal: Soft. Bowel sounds are normal. She exhibits no distension and no mass. There is no tenderness. There is no rebound and no guarding.   Musculoskeletal: She exhibits edema. She exhibits no tenderness.   Right hip slightly swollen.  Surgical dressing C/D/I.   Neurological: She is alert and oriented to person, place, and time.   Skin: Skin is warm and dry. She is not diaphoretic.   Psychiatric: She has a normal mood and affect. Her behavior is normal. Thought content normal.   Nursing note and vitals reviewed.      Fluids    Intake/Output Summary (Last 24 hours) at 06/06/19 1546  Last data filed at 06/06/19 0600   Gross per 24 hour   Intake                0 ml   Output              450 ml   Net             -450 ml       Laboratory  Recent Labs      06/05/19   0348   WBC  13.7*   RBC  4.31   HEMOGLOBIN  12.3   HEMATOCRIT  37.0   MCV  85.8   MCH  28.5   MCHC  33.2*   RDW  59.2*   PLATELETCT  212   MPV  9.9     Recent Labs      06/05/19   0348   SODIUM  135   POTASSIUM  3.5*   CHLORIDE  101   CO2   23   GLUCOSE  77   BUN  14   CREATININE  0.55   CALCIUM  8.7                   Imaging  DX-PORTABLE FLUOROSCOPY < 1 HOUR   Final Result         Portable fluoroscopy utilized for 2 minutes.      DX-PELVIS-1 OR 2 VIEWS   Final Result      Status post hardware removal from the right SI joint      DX-CHEST-PORTABLE (1 VIEW)   Final Result      Interstitial prominence could be due to pulmonary edema or hypoventilatory change.      DX-CHEST-LIMITED (1 VIEW)   Final Result      LEFT basilar underinflation atelectasis or pneumonia      CT-DRAIN-HEMATOMA - SEROMA   Final Result      CT-guided RIGHT pelvic fluid collection aspiration, laboratory evaluation pending              Assessment/Plan    Diarrhea: New problem.  Given being treated with antibiotics, concern for C. difficile infection.  Afebrile.  She has a leukocytosis but that could also be form acute reaction to her surgery yesterday.  Put in isolation.  Check stool C. difficile.  Treatment to be initiated if stool positive for C. difficile toxin.    * Abscess of right iliac muscle and right gluteus medius muscle- (present on admission)   Assessment & Plan    Recurrent issue since sacral fx repair in December 2018. Has had multiple IR drainage and completed antibx.  - S/P CT guided abscess drainage on 5/30; 2 mL removed  - Culture negative and therefore no antibiotics were continued  - ID and orthopedic surgery following  - hardware removal on 6/5     Sepsis (HCC)   Assessment & Plan    This is sepsis (without associated acute organ dysfunction).  Patient with new fever and tachycardia today.  From her retained hip hardware versus new infection.  There was concern for PICC line infection, so the PICC line will discontinue culture.  Continue cefepime and vancomycin per infectious disease.  Follow-up blood culture, urinalysis, chest x-ray     Hypomagnesemia- (present on admission)   Assessment & Plan    Replete as needed     RLS (restless legs syndrome)- (present on  admission)   Assessment & Plan    Restarted home dose of pramipexole.  Tolerating well     Chronic pain- (present on admission)   Assessment & Plan    Primarily located at left hip.  -Continue PRN oxycodone and flexeril.  -Morphine for breakthrough  -Lidocaine patches      History of DVT (deep vein thrombosis)- (present on admission)   Assessment & Plan    Previously on Xarelto prior to admission.  I discussed this with the orthopedic surgery PA today.  He recommends against restarting Xarelto until at least 48 hours.  Patient has a history of DVT.  Her risks of having another recurrence DVT given recent surgery is high but must be balanced by the risks of bleeding from agent that does not have a reversible antidote (i.e. Xarelto).  Therefore at the minimum, okay with DVT prophylaxis level Lovenox.  Order Lovenox 40 mg subcutaneous daily starting today.     Hypercalcemia- (present on admission)   Assessment & Plan    Chronic. Continue sensipar.      Essential hypertension- (present on admission)   Assessment & Plan    Continues to be normotensive   - Continue home losartan, metoprolol, & amlodipine.      Chronic hyponatremia- (present on admission)   Assessment & Plan    Last sodium level 135.  Continue to monitor.     History of breast cancer- (present on admission)   Assessment & Plan    S/p left mastectomy and breast implant  -Outpatient follow-up     COPD (chronic obstructive pulmonary disease) (HCC)- (present on admission)   Assessment & Plan    Stable. Doing well on room air. No respiratory distress/SOB. No evidence of exacerbation but will continue to monitor.           VTE prophylaxis: Lovenox

## 2019-06-07 LAB
ALBUMIN SERPL BCP-MCNC: 2.9 G/DL (ref 3.2–4.9)
ALP SERPL-CCNC: 270 U/L (ref 30–99)
ALT SERPL-CCNC: 17 U/L (ref 2–50)
ANION GAP SERPL CALC-SCNC: 7 MMOL/L (ref 0–11.9)
AST SERPL-CCNC: 32 U/L (ref 12–45)
BASOPHILS # BLD AUTO: 0.5 % (ref 0–1.8)
BASOPHILS # BLD: 0.03 K/UL (ref 0–0.12)
BILIRUB CONJ SERPL-MCNC: 0.1 MG/DL (ref 0.1–0.5)
BILIRUB INDIRECT SERPL-MCNC: 0.4 MG/DL (ref 0–1)
BILIRUB SERPL-MCNC: 0.5 MG/DL (ref 0.1–1.5)
BUN SERPL-MCNC: 14 MG/DL (ref 8–22)
CALCIUM SERPL-MCNC: 7.4 MG/DL (ref 8.5–10.5)
CHLORIDE SERPL-SCNC: 99 MMOL/L (ref 96–112)
CO2 SERPL-SCNC: 24 MMOL/L (ref 20–33)
CREAT SERPL-MCNC: 0.39 MG/DL (ref 0.5–1.4)
EOSINOPHIL # BLD AUTO: 0.64 K/UL (ref 0–0.51)
EOSINOPHIL NFR BLD: 10.5 % (ref 0–6.9)
ERYTHROCYTE [DISTWIDTH] IN BLOOD BY AUTOMATED COUNT: 55.4 FL (ref 35.9–50)
GLUCOSE BLD-MCNC: 102 MG/DL (ref 65–99)
GLUCOSE BLD-MCNC: 114 MG/DL (ref 65–99)
GLUCOSE BLD-MCNC: 76 MG/DL (ref 65–99)
GLUCOSE BLD-MCNC: 80 MG/DL (ref 65–99)
GLUCOSE SERPL-MCNC: 127 MG/DL (ref 65–99)
HCT VFR BLD AUTO: 31.6 % (ref 37–47)
HGB BLD-MCNC: 10.4 G/DL (ref 12–16)
HIV 1+2 AB+HIV1 P24 AG SERPL QL IA: NON REACTIVE
IMM GRANULOCYTES # BLD AUTO: 0.01 K/UL (ref 0–0.11)
IMM GRANULOCYTES NFR BLD AUTO: 0.2 % (ref 0–0.9)
LYMPHOCYTES # BLD AUTO: 0.7 K/UL (ref 1–4.8)
LYMPHOCYTES NFR BLD: 11.5 % (ref 22–41)
MAGNESIUM SERPL-MCNC: 1.2 MG/DL (ref 1.5–2.5)
MCH RBC QN AUTO: 28 PG (ref 27–33)
MCHC RBC AUTO-ENTMCNC: 32.9 G/DL (ref 33.6–35)
MCV RBC AUTO: 85.2 FL (ref 81.4–97.8)
MONOCYTES # BLD AUTO: 0.67 K/UL (ref 0–0.85)
MONOCYTES NFR BLD AUTO: 11 % (ref 0–13.4)
NEUTROPHILS # BLD AUTO: 4.03 K/UL (ref 2–7.15)
NEUTROPHILS NFR BLD: 66.3 % (ref 44–72)
NRBC # BLD AUTO: 0 K/UL
NRBC BLD-RTO: 0 /100 WBC
PLATELET # BLD AUTO: 177 K/UL (ref 164–446)
PMV BLD AUTO: 10.5 FL (ref 9–12.9)
POTASSIUM SERPL-SCNC: 2.9 MMOL/L (ref 3.6–5.5)
PROT SERPL-MCNC: 6 G/DL (ref 6–8.2)
RBC # BLD AUTO: 3.71 M/UL (ref 4.2–5.4)
SODIUM SERPL-SCNC: 130 MMOL/L (ref 135–145)
WBC # BLD AUTO: 6.1 K/UL (ref 4.8–10.8)

## 2019-06-07 PROCEDURE — 85025 COMPLETE CBC W/AUTO DIFF WBC: CPT

## 2019-06-07 PROCEDURE — 700111 HCHG RX REV CODE 636 W/ 250 OVERRIDE (IP): Performed by: NURSE PRACTITIONER

## 2019-06-07 PROCEDURE — A9270 NON-COVERED ITEM OR SERVICE: HCPCS | Performed by: INTERNAL MEDICINE

## 2019-06-07 PROCEDURE — A9270 NON-COVERED ITEM OR SERVICE: HCPCS | Performed by: HOSPITALIST

## 2019-06-07 PROCEDURE — 83735 ASSAY OF MAGNESIUM: CPT

## 2019-06-07 PROCEDURE — 700102 HCHG RX REV CODE 250 W/ 637 OVERRIDE(OP): Performed by: INTERNAL MEDICINE

## 2019-06-07 PROCEDURE — 99231 SBSQ HOSP IP/OBS SF/LOW 25: CPT | Performed by: INTERNAL MEDICINE

## 2019-06-07 PROCEDURE — 80048 BASIC METABOLIC PNL TOTAL CA: CPT

## 2019-06-07 PROCEDURE — 99233 SBSQ HOSP IP/OBS HIGH 50: CPT | Performed by: INTERNAL MEDICINE

## 2019-06-07 PROCEDURE — 700111 HCHG RX REV CODE 636 W/ 250 OVERRIDE (IP): Performed by: INTERNAL MEDICINE

## 2019-06-07 PROCEDURE — 700105 HCHG RX REV CODE 258: Performed by: INTERNAL MEDICINE

## 2019-06-07 PROCEDURE — 700102 HCHG RX REV CODE 250 W/ 637 OVERRIDE(OP): Performed by: HOSPITALIST

## 2019-06-07 PROCEDURE — 80076 HEPATIC FUNCTION PANEL: CPT

## 2019-06-07 PROCEDURE — 87305 ASPERGILLUS AG IA: CPT

## 2019-06-07 PROCEDURE — A9270 NON-COVERED ITEM OR SERVICE: HCPCS | Performed by: NURSE PRACTITIONER

## 2019-06-07 PROCEDURE — 700101 HCHG RX REV CODE 250: Performed by: NURSE PRACTITIONER

## 2019-06-07 PROCEDURE — 86635 COCCIDIOIDES ANTIBODY: CPT

## 2019-06-07 PROCEDURE — 36415 COLL VENOUS BLD VENIPUNCTURE: CPT

## 2019-06-07 PROCEDURE — 82962 GLUCOSE BLOOD TEST: CPT

## 2019-06-07 PROCEDURE — 87449 NOS EACH ORGANISM AG IA: CPT

## 2019-06-07 PROCEDURE — G0475 HIV COMBINATION ASSAY: HCPCS

## 2019-06-07 PROCEDURE — 700102 HCHG RX REV CODE 250 W/ 637 OVERRIDE(OP): Performed by: NURSE PRACTITIONER

## 2019-06-07 PROCEDURE — 770020 HCHG ROOM/CARE - TELE (206)

## 2019-06-07 RX ORDER — POTASSIUM CHLORIDE 20 MEQ/1
40 TABLET, EXTENDED RELEASE ORAL ONCE
Status: COMPLETED | OUTPATIENT
Start: 2019-06-07 | End: 2019-06-07

## 2019-06-07 RX ORDER — ACETAMINOPHEN 500 MG
1000 TABLET ORAL 2 TIMES DAILY
Status: DISCONTINUED | OUTPATIENT
Start: 2019-06-07 | End: 2019-07-22

## 2019-06-07 RX ORDER — LORAZEPAM 0.5 MG/1
0.5 TABLET ORAL
Status: COMPLETED | OUTPATIENT
Start: 2019-06-07 | End: 2019-06-22

## 2019-06-07 RX ORDER — SODIUM CHLORIDE 450 MG/100ML
INJECTION, SOLUTION INTRAVENOUS CONTINUOUS
Status: DISPENSED | OUTPATIENT
Start: 2019-06-07 | End: 2019-06-08

## 2019-06-07 RX ORDER — MAGNESIUM SULFATE HEPTAHYDRATE 40 MG/ML
4 INJECTION, SOLUTION INTRAVENOUS ONCE
Status: COMPLETED | OUTPATIENT
Start: 2019-06-07 | End: 2019-06-07

## 2019-06-07 RX ADMIN — OXYCODONE HYDROCHLORIDE 10 MG: 10 TABLET ORAL at 22:28

## 2019-06-07 RX ADMIN — PRAMIPEXOLE DIHYDROCHLORIDE 1 MG: 0.5 TABLET ORAL at 12:03

## 2019-06-07 RX ADMIN — LIDOCAINE 2 PATCH: 50 PATCH CUTANEOUS at 13:47

## 2019-06-07 RX ADMIN — OXYCODONE HYDROCHLORIDE 10 MG: 10 TABLET ORAL at 08:12

## 2019-06-07 RX ADMIN — ATORVASTATIN CALCIUM 10 MG: 10 TABLET, FILM COATED ORAL at 17:56

## 2019-06-07 RX ADMIN — ACETAMINOPHEN 1000 MG: 500 TABLET ORAL at 17:57

## 2019-06-07 RX ADMIN — OXYCODONE HYDROCHLORIDE 10 MG: 10 TABLET ORAL at 02:02

## 2019-06-07 RX ADMIN — CEFEPIME 2 G: 2 INJECTION, POWDER, FOR SOLUTION INTRAVENOUS at 17:56

## 2019-06-07 RX ADMIN — CINACALCET HYDROCHLORIDE 60 MG: 30 TABLET, COATED ORAL at 05:49

## 2019-06-07 RX ADMIN — AMLODIPINE BESYLATE 10 MG: 5 TABLET ORAL at 05:46

## 2019-06-07 RX ADMIN — PRAMIPEXOLE DIHYDROCHLORIDE 1 MG: 0.5 TABLET ORAL at 05:44

## 2019-06-07 RX ADMIN — ENOXAPARIN SODIUM 40 MG: 100 INJECTION SUBCUTANEOUS at 05:53

## 2019-06-07 RX ADMIN — ACETAMINOPHEN 1000 MG: 500 TABLET ORAL at 12:03

## 2019-06-07 RX ADMIN — POTASSIUM CHLORIDE 40 MEQ: 1500 TABLET, EXTENDED RELEASE ORAL at 08:12

## 2019-06-07 RX ADMIN — METOPROLOL TARTRATE 25 MG: 25 TABLET, FILM COATED ORAL at 17:56

## 2019-06-07 RX ADMIN — MORPHINE SULFATE 4 MG: 4 INJECTION INTRAVENOUS at 03:00

## 2019-06-07 RX ADMIN — CEFEPIME 2 G: 2 INJECTION, POWDER, FOR SOLUTION INTRAVENOUS at 05:50

## 2019-06-07 RX ADMIN — LOSARTAN POTASSIUM 50 MG: 50 TABLET ORAL at 05:46

## 2019-06-07 RX ADMIN — PRAMIPEXOLE DIHYDROCHLORIDE 1 MG: 0.5 TABLET ORAL at 18:07

## 2019-06-07 RX ADMIN — OMEPRAZOLE 20 MG: 20 CAPSULE, DELAYED RELEASE ORAL at 05:47

## 2019-06-07 RX ADMIN — METOPROLOL TARTRATE 25 MG: 25 TABLET, FILM COATED ORAL at 05:46

## 2019-06-07 RX ADMIN — MAGNESIUM SULFATE IN WATER 4 G: 40 INJECTION, SOLUTION INTRAVENOUS at 12:03

## 2019-06-07 RX ADMIN — SODIUM CHLORIDE: 4.5 INJECTION, SOLUTION INTRAVENOUS at 16:14

## 2019-06-07 RX ADMIN — SENNOSIDES AND DOCUSATE SODIUM 2 TABLET: 8.6; 5 TABLET ORAL at 17:56

## 2019-06-07 RX ADMIN — POTASSIUM CHLORIDE 40 MEQ: 1500 TABLET, EXTENDED RELEASE ORAL at 05:47

## 2019-06-07 RX ADMIN — VANCOMYCIN HYDROCHLORIDE 800 MG: 100 INJECTION, POWDER, LYOPHILIZED, FOR SOLUTION INTRAVENOUS at 18:23

## 2019-06-07 ASSESSMENT — ENCOUNTER SYMPTOMS
DIARRHEA: 0
BLOOD IN STOOL: 0
CONSTIPATION: 0
ABDOMINAL PAIN: 0
BLURRED VISION: 0
DIZZINESS: 0
MYALGIAS: 0
FEVER: 0
VOMITING: 0
SHORTNESS OF BREATH: 0
HEADACHES: 0

## 2019-06-07 NOTE — PROGRESS NOTES
Bedside report received from NOC RN Sumi pt is awake and alert with mild report of pain, mediations given by NOC RN, day RN to follow up with pain assessments and medications. Bed is locked in lowest position with call light, belongings within reach, white board updated, and POC discussed. All needs met at this time.

## 2019-06-07 NOTE — PROGRESS NOTES
Infectious Disease Progress Note    Author: Geno Junior M.D. Date & Time of service: 6/7/2019  11:38 AM    Chief Complaint:  Brain abscess, iliopsoas abscess, leukopenia        Interval History:  68 y.o. female admitted 5/29/2019 as a transfer from [facility].  She has bben there since 4/30/2019. + diabetes, SANTOSH of right hip with hardware, and breast cancer who was originally admitted to [facility] on 03/08/2019 for right hip and pelvic pain.  Work-up revealed a right iliopsoas lesion, gluteal abscess, and mult brain abscesses  5/6 Interval 24 hours of Vitals/Labs/Micro,and imaging results reviewed as available. See assessment.   5/10 AF WBC 3 continued c/o constipation denies HA, visual changes, neuro deficits  5/11 AF WBC 8.8 isolation stopped due to resolution neutropenia-sleepy today  5/12 AF no CBC somnolent-no acute events noted  5/13 AF WBC 6.2 c/o pain left hip today, unchanged Worse with movement and improved with ice. Refused PT yesterday   5/14 AF c/o dizzyness whenever she tirns on her side-no new HA or visual changes-still havng hip pain  5/15 AF sleeping at time of rounds-by report ambulating  5/16 AF weightbearing continues to improve-ambulating more.  No new complaints  5/20- still has some pain in the hip but ambulating without any issues. No fevers. In good spirits.  5/22/2019 continues to have hip pain.  No fevers have been noted  5/24 AF no CBC plan for CHAS today. Denies any headaches. States she is ambulating  5/25 afebrile CBC not done today.  Patient CHAS was negative.  She has a poor appetite and is requesting an appetite stimulant.  Ongoing left hip pain.  Plan for CT-guided drainage tomorrow  5/28 afebrile WBC 4.5.  Patient is resting comfortably.  She states that she has right breast pain.  Ultrasound of the breast otherwise unremarkable.  She is awaiting CT-guided drainage which has been delayed as CT scan malfunctioning yesterday.  5/29 afebrile, no CBC.  Patient denies any right  hip pain.  Continues to have left-sided hip pain especially with sitting. She had a repeat CT of the pelvis yesterday that revealed no changes in the abscess.  Per report, abscesses are too small for IR drainage given location and recommendation to transfer the patient to [facility] for CT-guided aspiration of the fluid for culture.  She continues to deny any fevers or chills.   AF c/o hungry and right hip pain Awaiting procedure. Denies SE abx  2019-no fevers.  Complains of pain in the hips.  Underwent aspiration of the pelvic abscess.  The cultures are negative  2019-no fevers.  Continues to complain of significant pain in her hips.  Pain medications are being adjusted.  2019-no fevers.  Continues to remain off the antibiotics.  Awaiting Ortho evaluation  2019 patient febrile up to 103.  Having shaking chills.  Is not feeling well.  Denies any specific complaints.  Labs Reviewed, Medications Reviewed, Radiology Reviewed and Wound Reviewed.   Interval 24 hours of Vitals/Labs/Micro,and imaging results reviewed as available. See assessment.    Interval 24 hours of Vitals/Labs/Micro,and imaging results reviewed as available. See assessment.     Review of Systems:  ROS    Hemodynamics:  Temp (24hrs), Av.6 °C (97.8 °F), Min:36.2 °C (97.1 °F), Max:36.8 °C (98.2 °F)  Temperature: 36.2 °C (97.1 °F)  Pulse  Av.2  Min: 60  Max: 125  Blood Pressure : 128/66       Physical Exam:  Physical Exam    Meds:    Current Facility-Administered Medications:   •  vancomycin  •  magnesium sulfate  •  acetaminophen  •  enoxaparin (LOVENOX) injection  •  tramadol  •  insulin regular **AND** Accu-Chek ACHS **AND** NOTIFY MD and PharmD **AND** glucose **AND** dextrose 10% bolus  •  MD Alert...Vancomycin per Pharmacy  •  cefepime  •  NS  •  pramipexole  •  lidocaine  •  oxyCODONE immediate-release  •  temazepam  •  morphine injection  •  cyclobenzaprine  •  Respiratory Care per Protocol  •   amLODIPine  •  cinacalcet  •  losartan  •  acetaminophen  •  atorvastatin  •  omeprazole  •  metoprolol  •  senna-docusate **AND** polyethylene glycol/lytes **AND** magnesium hydroxide **AND** bisacodyl  •  ondansetron  •  ondansetron    Labs:  Recent Labs      06/05/19 0348 06/07/19   0055   WBC  13.7*  6.1   RBC  4.31  3.71*   HEMOGLOBIN  12.3  10.4*   HEMATOCRIT  37.0  31.6*   MCV  85.8  85.2   MCH  28.5  28.0   RDW  59.2*  55.4*   PLATELETCT  212  177   MPV  9.9  10.5   NEUTSPOLYS   --   66.30   LYMPHOCYTES   --   11.50*   MONOCYTES   --   11.00   EOSINOPHILS   --   10.50*   BASOPHILS   --   0.50     Recent Labs      06/05/19 0348 06/07/19   0055   SODIUM  135  130*   POTASSIUM  3.5*  2.9*   CHLORIDE  101  99   CO2  23  24   GLUCOSE  77  127*   BUN  14  14     Recent Labs      06/05/19 0348 06/07/19 0055   CREATININE  0.55  0.39*       Imaging:  Ct-drain-hematoma - Seroma    Result Date: 5/30/2019  HISTORY/REASON FOR EXAM:  68-year-old woman with multiple fluid collections adjacent to a chronic RIGHT iliac fracture site TECHNIQUE/EXAM DESCRIPTION: CT-guided RIGHT pelvic fluid collection aspiration. Low dose optimization technique was utilized for this CT exam including automated exposure control and adjustment of the mA and/or kV according to patient size. COMPARISON: CT 4/4/2019 MEDICATIONS: Moderate sedation was achieved with administration of 2 mg versed IV and 100 micrograms fentanyl IV. Sedation was administered for a total of 30 minutes and cardiorespiratory function was monitored by trained nursing staff throughout. PROCEDURE: The risks, benefits, goals and objectives and alternatives were discussed. Risks were specified as including but not limited to bleeding, infection, damage to vessels or nerves, pain and discomfort as well as nondiagnosis. The patient's questions were answered. Informed oral and written consent were obtained. The patient was appropriately positioned on the CT gantry.  Initial CT imaging was performed and a site for percutaneously accessing the target lesion was selected and marked. The skin was prepped and draped in the usual sterile manner. A timeout was performed. Local anesthetic result was achieved with administration of1% lidocaine. Using CT fluoroscopic guidance a 17-gauge guiding needle was advanced to the target location. 2 mL of thick purulent fluid was aspirated. The needle was removed. Completion scanning was performed. The patient tolerated the procedure well with no evidence of complication.  The skin was cleaned and a dressing was applied. FINDINGS: Appropriate needle position in the most cephalad collection. No hematoma at conclusion.     CT-guided RIGHT pelvic fluid collection aspiration, laboratory evaluation pending     Ct-pelvis With    Result Date: 5/28/2019 5/28/2019 12:54 PM HISTORY/REASON FOR EXAM: Follow-up abscess TECHNIQUE/EXAM DESCRIPTION AND NUMBER OF VIEWS: CT scan of the pelvis with contrast. Contrast-enhanced helical scanning of the pelvis was obtained from the iliac crests through the pubic symphysis following the bolus administration of 100 mL of Omnipaque 350 nonionic contrast without complication. Low dose optimization technique was utilized for this CT exam including automated exposure control and adjustment of the mA and/or kV according to patient size. COMPARISON: MRI scan of the pelvis 5/20/2019 and CT chest abdomen and pelvis 4/23/2019 FINDINGS: Again redemonstrated is a 3.2 x 2 cm multiloculated low-attenuation collection in the right iliacus muscle unchanged from recent MRI scan of the pelvis. There is also a 2.8 x 1.7 cm low-attenuation collection in the right gluteus medius muscle. This collection is also unchanged from recent MRI scan of the pelvis. Previous fixation screws are noted coursing through the right lateral ilium across the sacrum extending into the left lateral ilium. There is a healing fracture of the right posterior  ilium. There is no evidence of free fluid in the pelvis or pelvic mass. There are scattered air-fluid levels throughout the small bowel     1.  Stable abscesses again redemonstrated in the right iliac's muscle and right gluteus medius muscle. 2.  Status post fixation screws coursing through the jose antonio and sacrum for treatment of healing fracture in the right posterior ilium. 3.  Adynamic ileus.    Dx-chest-limited (1 View)    Result Date: 6/4/2019 6/4/2019 2:53 PM HISTORY/REASON FOR EXAM:  Fever TECHNIQUE/EXAM DESCRIPTION AND NUMBER OF VIEWS: Single portable view of the chest. COMPARISON: 5/21/2019 FINDINGS: RIGHT upper extremity PICC is in unchanged position. LEFT axillary surgical clips are redemonstrated. HEART: Stable size. LUNGS: Lung volumes are low. There is patchy LEFT basilar opacity. PLEURA: No effusion or pneumothorax.     LEFT basilar underinflation atelectasis or pneumonia    Dx-chest-portable (1 View)    Result Date: 6/4/2019 6/4/2019 8:01 PM HISTORY/REASON FOR EXAM:  Fever. History of cough TECHNIQUE/EXAM DESCRIPTION AND NUMBER OF VIEWS: Single portable view of the chest. COMPARISON: June 4, 2019 3:20 PM FINDINGS: Right-sided PICC line remains in place. There is interstitial prominence. No effusion or pneumothorax identified. There are bilateral breast implants.     Interstitial prominence could be due to pulmonary edema or hypoventilatory change.    Dx-chest-portable (1 View)    Result Date: 5/21/2019 5/21/2019 2:30 PM HISTORY/REASON FOR EXAM:  Right-sided neck pain. TECHNIQUE/EXAM DESCRIPTION AND NUMBER OF VIEWS: Single portable view of the chest. COMPARISON: 4/30/2019 FINDINGS: Single portable view of the chest demonstrates a normal cardiac silhouette and mediastinal contours. Calcification is in the aortic knob. No discrete opacity, pleural fluid, or pneumothorax. A right PICC line remains in place. The tip appears to be located at the cavoatrial junction.     1.  No acute cardiopulmonary  findings. 2.  Right PICC line place with the tip projecting over the cavoatrial junction    Dx-pelvis-1 Or 2 Views    Result Date: 6/5/2019 6/5/2019 7:30 AM HISTORY/REASON FOR EXAM:  Main OR. Hardware removal of right SI joint TECHNIQUE/EXAM DESCRIPTION AND NUMBER OF VIEWS:  1 view(s) of the pelvis. COMPARISON:  None. FINDINGS: Single fluoroscopic image obtained during hardware removal the right SI joint. Fluoroscopy time: 2 minutes     Status post hardware removal from the right SI joint    Mr-brain-with & W/o    Result Date: 5/21/2019 5/20/2019 3:37 PM HISTORY/REASON FOR EXAM:  Follow-up brain abscesses. TECHNIQUE/EXAM DESCRIPTION:   MRI of the brain without and with contrast. T1 sagittal, T2 fast spin-echo axial, T1 coronal, FLAIR coronal, diffusion-weighted and apparent diffusion coefficient (ADC map) axial images were obtained of the whole brain. T1 postcontrast axial and T1 postcontrast coronal images were obtained. The study was performed on a [employer] 1.5 Miranda MRI scanner. 6 mL Gadavist contrast was administered intravenously. COMPARISON:  MRI scan of the brain 4/24/2019 FINDINGS: There are innumerable small ovoid enhancing foci throughout the brain parenchyma, both in the supratentorial and infratentorial compartments. The number of these lesions has increased significantly since previous exam. The previously identified largest lesion in the right brachium pontis has resolved. There is evidence of increased T2 signal intensity in many of these lesions and there is surrounding increased T2 signal intensity in several lesions in the right superior frontal lobe. The calvariae are unremarkable. There are no extra-axial fluid collections. The ventricular system and basal cisterns are mild to moderately prominent. There is mild-to-moderate prominence of the cortical sulci and gyri. There are no mass effects or shift of midline structures. There are no hemorrhagic lesions. The diffusion-weighted axial images  show no evidence of acute cerebral infarction. The brainstem and posterior fossa structures are unremarkable. Vascular flow voids in the vertebrobasilar and carotid arteries, [place], and dural venous sinuses are intact. The paranasal sinuses and mastoids in the field of view are unremarkable.     1.  Increase in number of innumerable small ovoid enhancing lesions throughout the brain parenchyma. These lesions may represent microabscesses of either bacterial or fungal origin or possibly brain metastases. 2.  Age-related cerebral atrophy.    Mr-pelvis-with & W/o And Sequences    Result Date: 5/21/2019 5/20/2019 3:37 PM HISTORY/REASON FOR EXAM:  Abd pain, fever, abscess suspected; evaluate abscesses TECHNIQUE/EXAM DESCRIPTION: MRI of the pelvis without and with contrast. The study was performed on a [employer] 1.5 Miranda MRI scanner. T1 axial, T1 coronal, T2 fast spin-echo fat-suppressed axial, and T2 fast spin-echo fat-suppressed coronal images were obtained of the pelvis. Postcontrast fat-suppressed intravenous gadolinium enhanced sequences in the axial and coronal planes were then  performed. 6 mL Gadavist contrast was administered intravenously. COMPARISON: 3/26/2019. CT 4/23/2019 FINDINGS: Interval decrease in size of the collection in the right gluteal medius muscle, measuring 1.4 x 1.9 cm, previously 2.3 x 2.8 cm. There is minimal surrounding stranding. Mild heterogeneity and stranding in the overlying right gluteus cornelio muscle without discrete collection. Grossly unchanged in size of the multiloculated collection in the right iliacus muscle, measuring 2.4 x 3.5 cm. Postsurgical change in the sacrum with susceptibility artifact from the screws. Moderate osteoarthritis of the bilateral hip joints.     1. Interval decrease in size of the collection in the right gluteal medius muscle, measuring 1.4 x 1.9 cm, previously 2.3 x 2.8 cm. There is minimal surrounding stranding. Mild heterogeneity and  stranding in the overlying right gluteus cornelio muscle without discrete collection, could relate to reactive change or phlegmon. 2. Grossly unchanged in size of the multiloculated collection in the right iliacus muscle, measuring 2.4 x 3.5 cm.    Us-chest    Result Date: 2019 10:23 AM HISTORY/REASON FOR EXAM:  Right lateral breast pain, evaluate implant TECHNIQUE/EXAM DESCRIPTION AND NUMBER OF VIEWS: Targeted ultrasound of the right breast. COMPARISON: None FINDINGS: No gross mass or implant rupture are identified. Diagnostic assessment is not feasible lack of proper equipment tube performed breast imaging     1.  Nondiagnostic assessment of the right breast without gross implant rupture identified 2.  Recommend further assessment with mammography and ultrasound at an accredited breast facility    Dx-portable Fluoroscopy < 1 Hour    Result Date: 2019 7:30 AM HISTORY/REASON FOR EXAM:  Right S1 screws removal, Main OR TECHNIQUE/EXAM DESCRIPTION AND NUMBER OF VIEWS: Portable fluoroscopy for less than one hour FINDINGS:      The portable fluoroscopy unit was obligated to the procedure for less than one hour.   Actual fluoro time was 2 minutes.     Portable fluoroscopy utilized for 2 minutes.    Ec-halie W/o Cont    Result Date: 2019  Transesophageal Echo Report Echocardiography Laboratory CONCLUSIONS Normal esophageal echocardiogram. No evidence of endocarditis. No significant change since the prior study of 3/15/2019. MARLENA CLIFFORD Exam Date:          2019                     12:48 Exam Location:      Inpatient Priority:            Routine Ordering Physician:        DARON WHELAN Referring Physician: Sonographer:               CATALINO Rivers Age:    68     Gender:    F MRN:    [id number] :    [id number] BSA:           Ht (in):           Wt (lb): Report Type:      Complete Indications:     Endocarditis and  heart valve disorders in diseases                  classified elsewhere ICD Codes:       I39 CPT Codes:       67094 BP:          /          HR: Technical Quality:       Fair MEASUREMENTS  (Male / Female) Normal Values 2D ECHO Estimated LV Ejection Fraction    65 %                  * Indicates values subject to auto-interpretation LV EF:  65    % Medications Medications determined by anesthesiologist. Limitations none Complications none Proc. Components The probe was inserted and manipulated by Dr Potter. Probe #SM  was used for this procedure. 2D, color Doppler, spectral Doppler, and 3D imaging were used as part of the evaluation as clinically indicated. FINDINGS Left Ventricle The left ventricle was normal in size and function. Right Ventricle The right ventricle was normal in size and function. Right Atrium The right atrium is normal in size. Left Atrium The left atrium is normal in size. LA Appendage Normal left atrial appendage. IA Septum The interatrial septum is normal. IV Septum The interventricular septum is normal. Mitral Valve Structurally normal mitral valve without significant stenosis or regurgitation.  No valvular vegetations. Aortic Valve Structurally normal aortic valve without significant stenosis or regurgitation.  No valvular vegetations. Tricuspid Valve Structurally normal tricuspid valve without significant stenosis or regurgitation.  No valvular vegetations. Pulmonic Valve Structurally normal pulmonic valve without significant stenosis or regurgitation.  No valvular vegetations. Pericardium Normal pericardium without effusion. Aorta The aortic root is normal. Christopher Potter MD (Electronically Signed) Final Date:     24 May 2019 15:42      Micro:  Results     Procedure Component Value Units Date/Time    Cryptococcal Antigen [[id number]]     Order Status:  No result Specimen:  Blood     C Diff by PCR rflx Toxin [[id number]] Collected:  06/06/19 0900    Order Status:  Completed Specimen:   "Stool from Stool Updated:  06/06/19 1541     C Diff by PCR Negative     Comment: C. difficile NOT detected by PCR.  Treatment not indicated per guidelines.  Repeat testing not indicated within 7 days.          027-NAP1-BI Presumptive Negative     Comment: Presumptive 027/NAP1/BI target DNA sequences are NOT DETECTED.       Narrative:       Special Contact [id number] MERYL PICKENS  Does this patient have risk factors for C-diff?->Yes    BLOOD CULTURE [[id number]] Collected:  06/04/19 1512    Order Status:  Completed Specimen:  Blood from Peripheral Updated:  06/05/19 0855     Significant Indicator NEG     Source BLD     Site PERIPHERAL     Culture Result No Growth  Note: Blood cultures are incubated for 5 days and  are monitored continuously.Positive blood cultures  are called to the RN and reported as soon as  they are identified.      Narrative:       Per Hospital Policy: Only change Specimen Src: to \"Line\" if  specified by physician order.  Left Forearm/Arm    BLOOD CULTURE [[id number]] Collected:  06/04/19 1512    Order Status:  Completed Specimen:  Blood from Peripheral Updated:  06/05/19 0855     Significant Indicator NEG     Source BLD     Site PERIPHERAL     Culture Result No Growth  Note: Blood cultures are incubated for 5 days and  are monitored continuously.Positive blood cultures  are called to the RN and reported as soon as  they are identified.      Narrative:       Per Hospital Policy: Only change Specimen Src: to \"Line\" if  specified by physician order.  Left Hand    Culture Respiratory W/ GRM STN [[id number]]     Order Status:  Completed Specimen:  Respirate from Sputum     URINALYSIS [[id number]]     Order Status:  No result Specimen:  Urine     BLOOD CULTURE [[id number]] Collected:  06/04/19 0000    Order Status:  Canceled Specimen:  Other from Peripheral     BLOOD CULTURE [[id number]] Collected:  06/04/19 0000    Order Status:  Canceled Specimen:  Other from Peripheral     FLUID CULTURE W/GRAM " "STAIN [[id number]] Collected:  05/30/19 1339    Order Status:  Completed Specimen:  Body Fluid Updated:  06/03/19 0726     Significant Indicator NEG     Source BF     Site pelvic abcess     Culture Result No growth at 4 days.     Gram Stain Result Few WBCs.  No organisms seen.            Assessment:  Active Hospital Problems    Diagnosis   • *Abscess of right iliac muscle and right gluteus medius muscle [L02.91]   • Sepsis (HCC) [A41.9]   • Hypomagnesemia [E83.42]   • Chronic pain [G89.29]   • RLS (restless legs syndrome) [G25.81]   • Hypercalcemia [E83.52]   • Essential hypertension [I10]   • Chronic hyponatremia [E87.1]   • History of breast cancer [Z85.3]   • COPD (chronic obstructive pulmonary disease) (HCC) [J44.9]   • History of DVT (deep vein thrombosis) [Z86.718]        Assessment:  Gluteal and iliopsoas abscess  Brain abscesses vs mets  Breast cancer  DM     Interval 24 hour assessment:    AF, O2 0.5 NC,    Labs reviewed  Micro reviewed    Pt continued on cefepime and vancomycin.  She is reporting some hand tremor and bilateral hip pain.  She was sitting up in bed and eating.  She denies any headaches and appears to mentating well.      Plan:     New fevers. 101 on 6/4 -now improved  Uncertain etiology, may be related to known abscesses, see below or central fever secondary to potential brain mets/abscesses  Panculture again-blood cultures on 6/4 with no growth to date  Pull the PICC line  Went to the OR as below on 6/5     Leukocytosis, new  -  likely secondary to surgery yesterday     Gluteal and iliopsoas abscess s/p treatment.  Additional work-up ongoing  Adjacent to hardware in right hip (placed 12/17/18)  CT 4/23 \"Fluid collections within the right gluteal and iliacis muscles.. The collection within the right gluteal muscle has unchanged while the fluid collection within the right iliacis muscle is increased somewhat in size.\" 2.7 cm  Cultures 3/29 and 4/4 neg  MRI on 5/20/2019 - interval decrease in " "collection in R gluteal muscle and persistent multiloculated collection in R iliacus muscle.   CT 5/28 no change  Underwent drainage on 5/30/2019-cultures are negative  ESR is 32 and CRP has come down to 0.98 from 3.53  OR on 6/6 with removal of hardware from R hip     --- Continue vancomycin and cefepime for now-if all cultures remain negative do not plan extended antibiotic course as she is Harsh had multiple weeks of IV antibiotics and the fevers may be related to her ongoing intracerebral process  --- Follow-up OR cultures     Brain abscesses vs mets  - MRI 4/23 \"supra and infratentorial brain parenchyma... interval decrease in the size of the lesions. There are also interval reduction in the extent of the surrounding white matter edema in the restricted diffusion consistent with improvement/treatment response of the most of the multifocal brain abscesses.  - Repeat MRI 5/21 showed innumerable microabscesses vs mets -this change occurred while she was still on broad-spectrum antibiotics indicating this is either not an infectious process or an infectious etiology that is not covered by either vancomycin cefepime or Flagyl  CHAS neg 3/15 and 5/24  No benefit of PET scan per notes  Abx (vancomycin, cefepime and Flagyl) discontinued on 5/23-developed new fevers on 6/4, uncertain etiology  HIV negative    --- Concern her brain abscess may be metastasis, however initiated work-up for non-common bacterial causes-cryptococcus antigen, cocci, beta D glucan and galactomannan sent  --- Recommend repeat MRI brain with contrast-we have no confirmed explanation for her innumerable microabscesses reported on 5/21-need to assess for improvement versus worsening and if further work-up is needed     History of pelvic fracture  Status post pin placement and sacral  Eventually will need removal     Type 2 DM  Hemoglobin A1c 6.3   Keep BS under 150 to help control current infection    Diarrhea  -C. difficile negative on 6/6    Plan " to discharge to skilled facility.     Discussed IM.  ID will follow.         Geno Junior MD  Infectious Diseases

## 2019-06-07 NOTE — DISCHARGE PLANNING
Agency/Facility Name: [facility]  Spoke To: SUZIE for [facility]  Outcome: Left a message regarding bed status

## 2019-06-07 NOTE — PROGRESS NOTES
"   Orthopaedic Progress Note    Interval changes:  Patient doing well post op  Dressing CDI     ROS - Patient denies any new issues.  Pain well controlled.    /60   Pulse 96   Temp 36.7 °C (98.1 °F) (Temporal)   Resp 18   Ht 1.575 m (5' 2\")   Wt 61.3 kg (135 lb 2.3 oz)   SpO2 95%       Patient seen and examined  No acute distress  Breathing non labored  RRR  Right hip surgical dressing is clean, dry, and intact. Patient clearly fires tibialis anterior, EHL, and gastrocnemius/soleus. Sensation is intact to light touch throughout superficial peroneal, deep peroneal, tibial, saphenous, and sural nerve distributions. Strong and palpable 2+ dorsalis pedis and posterior tibial pulses with capillary refill less than 2 seconds. No lower leg tenderness or discomfort.        Recent Labs      06/05/19   0348   WBC  13.7*   RBC  4.31   HEMOGLOBIN  12.3   HEMATOCRIT  37.0   MCV  85.8   MCH  28.5   MCHC  33.2*   RDW  59.2*   PLATELETCT  212   MPV  9.9       Active Hospital Problems    Diagnosis   • Sepsis (HCC) [A41.9]     Priority: High   • Abscess of right iliac muscle and right gluteus medius muscle [L02.91]     Priority: High   • Hypomagnesemia [E83.42]     Priority: Medium   • Chronic pain [G89.29]     Priority: Medium   • RLS (restless legs syndrome) [G25.81]     Priority: Medium   • Hypercalcemia [E83.52]     Priority: Low   • Essential hypertension [I10]     Priority: Low   • Chronic hyponatremia [E87.1]     Priority: Low   • History of breast cancer [Z85.3]     Priority: Low   • COPD (chronic obstructive pulmonary disease) (HCC) [J44.9]     Priority: Low   • History of DVT (deep vein thrombosis) [Z86.718]       Assessment/Plan:  Cleared for DC by ortho pending medicine and ID team clearance  POD#1 S/P Hardware removal, pelvis  Wt bearing status - WBAT  Wound care/Drains - dressing changes every other day by nursing  Future Procedures - none planned   Lovenox: Start 36-48 hours post op, Duration-until " ambulatory > 150'  Sutures/Staples out- 10-14 days post operatively  PT/OT-initiated  Antibiotics: maxipime 2g IV BID, vanco 600mg IV QD  DVT Prophylaxis- TEDS/SCDs/Foot pumps  Sue-none  Case Coordination for Discharge Planning - Disposition pending abx needs

## 2019-06-07 NOTE — CARE PLAN
Problem: Communication  Goal: The ability to communicate needs accurately and effectively will improve  Outcome: PROGRESSING AS EXPECTED    Intervention: [place] patient and significant other/support system to call light to alert staff of needs   06/06/19 6051   OTHER   Oriented to: All of the Following : Location of Bathroom, Visiting Policy, Unit Routine, Call Light and Bedside Controls, Bedside Rail Policy, Smoking Policy, Rights and Responsibilities, Bedside Report, and Patient Education Notebook         Problem: Knowledge Deficit  Goal: Knowledge of disease process/condition, treatment plan, diagnostic tests, and medications will improve  Outcome: PROGRESSING AS EXPECTED  Patient educated about disease process and plan of care for the shift. Patient expressed understanding. All questions answered at this time.

## 2019-06-07 NOTE — PROGRESS NOTES
Hospital Medicine Daily Progress Note    Date of Service  6/7/2019    Chief Complaint  68 y.o. female admitted 5/29/2019 with persistent abscess.    Hospital Course     Ms. Martini is a 68-year-old female who was transferred from [facility] on 5/29/2019 with persistent right gluteal abscesses since sacral fracture repair in December.  She has had multiple IR drainage.  She initially presented with pelvic pain, low back pain, and confusion. She also had new slurred speech. Imaging of the abdomen, pelvis and brain revealed abscesses and she was treated with a full course of IV antibiotics per ID.  Her slurred speech improved and she was able to ambulate with physical therapy. However, repeat imaging showed persistent pelvic abscesses.  MRI brain shows increase in brain lesions.  Her brain lesions are known from prior imaging and there is concern they represent metastasis.  Oncology aware and do not recommend further imaging. Cultures remained negative and a CHAS done by Dr. Potter showed no vegetations. The size of the pelvic abscesses was reviewed by interventional radiology and the case was discussed with Dr. Finley who recommended transfer to [facility] for CT guided drainage.  S/P drainage on 5/30.    hardware removal 6/5.       Interval Problem Update  Having 2 loose watery bowel movement this morning.    Consultants/Specialty  Infectious disease  Orthopedic surgery    Code Status  DNR/DNI    Disposition  Anticipate SNF need.    Review of Systems  Review of Systems   Constitutional: Negative for fever.   HENT: Negative for ear pain.    Eyes: Negative for blurred vision.   Respiratory: Negative for shortness of breath.    Cardiovascular: Negative for chest pain.   Gastrointestinal: Negative for abdominal pain, blood in stool, constipation, diarrhea, melena and vomiting.   Genitourinary: Negative for dysuria, hematuria and urgency.   Musculoskeletal: Positive for joint pain. Negative for myalgias.   Skin: Negative for rash.    Neurological: Negative for dizziness and headaches.        Physical Exam  Temp:  [36.2 °C (97.1 °F)-36.8 °C (98.2 °F)] 36.2 °C (97.2 °F)  Pulse:  [73-96] 80  Resp:  [16-20] 18  BP: (115-140)/(59-72) 117/63  SpO2:  [93 %-97 %] 94 %    I performed the physical exam today, 06/07/19, and compared to yesterday, 06/06/2019, no changes except for that noted below:  Physical Exam   Constitutional: She is oriented to person, place, and time. She appears well-developed and well-nourished. No distress.   HENT:   Head: Normocephalic and atraumatic.   Eyes: Conjunctivae are normal. No scleral icterus.   Neck: Neck supple.   Cardiovascular: Normal rate, regular rhythm and normal heart sounds.  Exam reveals no gallop and no friction rub.    No murmur heard.  Pulmonary/Chest: Effort normal and breath sounds normal. No respiratory distress. She has no wheezes. She has no rales.   Abdominal: Soft. Bowel sounds are normal. She exhibits no distension and no mass. There is no tenderness. There is no rebound and no guarding.   Musculoskeletal: She exhibits edema. She exhibits no tenderness.   Right hip slightly swollen.  Surgical dressing C/D/I.   Neurological: She is alert and oriented to person, place, and time.   Skin: Skin is warm and dry. She is not diaphoretic.   Psychiatric: She has a normal mood and affect. Her behavior is normal. Thought content normal.   Nursing note and vitals reviewed.      Fluids  No intake or output data in the 24 hours ending 06/07/19 1437    Laboratory  Recent Labs      06/05/19   0348  06/07/19   0055   WBC  13.7*  6.1   RBC  4.31  3.71*   HEMOGLOBIN  12.3  10.4*   HEMATOCRIT  37.0  31.6*   MCV  85.8  85.2   MCH  28.5  28.0   MCHC  33.2*  32.9*   RDW  59.2*  55.4*   PLATELETCT  212  177   MPV  9.9  10.5     Recent Labs      06/05/19   0348  06/07/19   0055   SODIUM  135  130*   POTASSIUM  3.5*  2.9*   CHLORIDE  101  99   CO2  23  24   GLUCOSE  77  127*   BUN  14  14   CREATININE  0.55  0.39*   CALCIUM   8.7  7.4*                   Imaging  DX-PORTABLE FLUOROSCOPY < 1 HOUR   Final Result         Portable fluoroscopy utilized for 2 minutes.      DX-PELVIS-1 OR 2 VIEWS   Final Result      Status post hardware removal from the right SI joint      DX-CHEST-PORTABLE (1 VIEW)   Final Result      Interstitial prominence could be due to pulmonary edema or hypoventilatory change.      DX-CHEST-LIMITED (1 VIEW)   Final Result      LEFT basilar underinflation atelectasis or pneumonia      CT-DRAIN-HEMATOMA - SEROMA   Final Result      CT-guided RIGHT pelvic fluid collection aspiration, laboratory evaluation pending         MR-BRAIN-WITH    (Results Pending)        Assessment/Plan    Diarrhea: Ruled out for C. difficile.  Supportive care.  IV fluid.  Imodium as needed.  ABX related?    Brain lesions: I ordered a repeat brain MRI with contrast to assess for evolution of the lesion.  Start IV fluid for renal protection.  Concern for metastasis he versus abscess.  Continue cefepime and vancomycin per ID.  Discussed with infectious disease.    * Abscess of right iliac muscle and right gluteus medius muscle- (present on admission)   Assessment & Plan    Recurrent issue since sacral fx repair in December 2018. Has had multiple IR drainage and completed antibx.  - S/P CT guided abscess drainage on 5/30; 2 mL removed  - Culture negative and therefore no antibiotics were continued  - ID and orthopedic surgery following  - hardware removal on 6/5     Sepsis (HCC)   Assessment & Plan    This is sepsis (without associated acute organ dysfunction).  Patient with new fever and tachycardia today.  From her retained hip hardware versus new infection.  There was concern for PICC line infection, so the PICC line will discontinue culture.  Continue cefepime and vancomycin per infectious disease.  Follow-up blood culture, urinalysis, chest x-ray     Hypomagnesemia- (present on admission)   Assessment & Plan    Max 1.2 today.  Order for gram of  intravenous magnesium.     RLS (restless legs syndrome)- (present on admission)   Assessment & Plan    Restarted home dose of pramipexole.  Tolerating well     Chronic pain- (present on admission)   Assessment & Plan    Start acetaminophen 1000 mg p.o. twice daily.  Expect to help with PT OT treatments..  Continue oxycodone for breakthrough pain as needed.     History of DVT (deep vein thrombosis)- (present on admission)   Assessment & Plan    Previously on Xarelto prior to admission. Continue Lovenox 40 mg subcutaneous daily per Ortho rec.  We will switch to full dose Xarelto when cleared by Ortho..     Hypercalcemia- (present on admission)   Assessment & Plan    Chronic. Continue sensipar.      Essential hypertension- (present on admission)   Assessment & Plan    Continues to be normotensive   - Continue home losartan, metoprolol, & amlodipine.      Chronic hyponatremia- (present on admission)   Assessment & Plan    Last sodium level 130.  Continue to monitor.  1/2 normal saline.     History of breast cancer- (present on admission)   Assessment & Plan    S/p left mastectomy and breast implant  -Outpatient follow-up     COPD (chronic obstructive pulmonary disease) (HCC)- (present on admission)   Assessment & Plan    Stable. Doing well on room air. No respiratory distress/SOB. No evidence of exacerbation but will continue to monitor.         Hypokalemia: Order 40 mEq of K-Dur x1.    VTE prophylaxis: Lovenox

## 2019-06-07 NOTE — CARE PLAN
Problem: Infection  Goal: Will remain free from infection  Outcome: PROGRESSING AS EXPECTED  Pt educated on the importance of hand washing to reduce the risk of infection. Pt verbally understands and performs hand hygiene to reduce to risks of infection.     Problem: Bowel/Gastric:  Goal: Normal bowel function is maintained or improved  Outcome: PROGRESSING AS EXPECTED  Pt educated on the use of stool softeners to aide in bowel maintenance and the importance of regular bowel movements with the use of pain medications

## 2019-06-07 NOTE — PROGRESS NOTES
Dr. Paula updated on patient's potassium of 2.9. Orders received for a one time dose of 40 mEq po potassium.

## 2019-06-07 NOTE — PROGRESS NOTES
"Pharmacy Kinetics 68 y.o. female on vancomycin day # 4   2019    Currently on Vancomycin 600 mg iv q24hr (2200)     Indication for Treatment: SSTI,  brain abscess     Pertinent history per medical record: Admitted on 2019 for pelvic abscess. Patient was recently admitted to [facility] from 3/8- for fever. She was found to have possible brain abscess as well as iliopsoas abscess. ID was consulted during that admission and she was treated with meropenem and vancomycin, with plans for 6 weeks of antibiotics. She eventually transferred to [facility] for completion of antibiotics. CT scan on  again demonstrated pelvic abscess, prompting return to [facility] for I&D, which was performed with IR on . She had removal of possibly infected hardware on . MRI showed increase in the number of innumerable small ovoid enhancing lesions throughout the brain parenchyma (possible brain abscess vs metastatic lesions). ID is following.      Other antibiotics: cefepime 2 g IV q8h     Allergies: Patient has no known allergies.      List concerns for renal function: age     Pertinent cultures to date:   19: PBCx2: NGTD  19: Pelvic abscess,BF: NGTD    MRSA nares swab if pneumonia is a concern (ordered/positive/negative/n-a): NA    Recent Labs      19   0348  19   0055   WBC  13.7*  6.1   NEUTSPOLYS   --   66.30     Recent Labs      19   0348  19   0055   BUN  14  14   CREATININE  0.55  0.39*     Recent Labs      19   2147   VANCOTROUGH  14.4   No intake or output data in the 24 hours ending 19 0754   /59   Pulse 73   Temp 36.8 °C (98.2 °F) (Temporal)   Resp 17   Ht 1.575 m (5' 2\")   Wt 61 kg (134 lb 7.7 oz)   SpO2 97%  Temp (24hrs), Av.6 °C (97.9 °F), Min:36.4 °C (97.6 °F), Max:36.8 °C (98.2 °F)      A/P   1. Vancomycin dose change: Vancomycin 800 mg iv q24hr (1800)  2. Next vancomycin level: 1-2 days   3. Goal trough: 18-20 mcg/mL   4. Comments: No leukocytosis , " afebrile, cx NGTD. Renal indices stable over interval. Trough level below goal , maintenance dose increased. ID following , continue current plan.     Willy CandelariaD BCPS

## 2019-06-07 NOTE — DISCHARGE PLANNING
Agency/Facility Name: [facility]  Spoke To: Krunal  Outcome: Krunal informed that they MIGHT have a bed available tomorrow. This CCA will follow up tomorrow, 06/08/2019.

## 2019-06-08 ENCOUNTER — APPOINTMENT (OUTPATIENT)
Dept: RADIOLOGY | Facility: MEDICAL CENTER | Age: 69
DRG: 356 | End: 2019-06-08
Attending: INTERNAL MEDICINE
Payer: MEDICARE

## 2019-06-08 LAB
ANION GAP SERPL CALC-SCNC: 10 MMOL/L (ref 0–11.9)
BASOPHILS # BLD AUTO: 0.8 % (ref 0–1.8)
BASOPHILS # BLD: 0.04 K/UL (ref 0–0.12)
BUN SERPL-MCNC: 10 MG/DL (ref 8–22)
CALCIUM SERPL-MCNC: 7.3 MG/DL (ref 8.5–10.5)
CHLORIDE SERPL-SCNC: 102 MMOL/L (ref 96–112)
CO2 SERPL-SCNC: 23 MMOL/L (ref 20–33)
CREAT SERPL-MCNC: 0.4 MG/DL (ref 0.5–1.4)
EOSINOPHIL # BLD AUTO: 0.67 K/UL (ref 0–0.51)
EOSINOPHIL NFR BLD: 13.7 % (ref 0–6.9)
ERYTHROCYTE [DISTWIDTH] IN BLOOD BY AUTOMATED COUNT: 57.2 FL (ref 35.9–50)
GALACTOMANNAN AG SERPL QL IA: NEGATIVE
GALACTOMANNAN AG SERPL-ACNC: 0.21
GLUCOSE BLD-MCNC: 121 MG/DL (ref 65–99)
GLUCOSE BLD-MCNC: 78 MG/DL (ref 65–99)
GLUCOSE BLD-MCNC: 84 MG/DL (ref 65–99)
GLUCOSE BLD-MCNC: 88 MG/DL (ref 65–99)
GLUCOSE BLD-MCNC: 92 MG/DL (ref 65–99)
GLUCOSE SERPL-MCNC: 92 MG/DL (ref 65–99)
HCT VFR BLD AUTO: 32.6 % (ref 37–47)
HGB BLD-MCNC: 10.8 G/DL (ref 12–16)
IMM GRANULOCYTES # BLD AUTO: 0.01 K/UL (ref 0–0.11)
IMM GRANULOCYTES NFR BLD AUTO: 0.2 % (ref 0–0.9)
LYMPHOCYTES # BLD AUTO: 0.75 K/UL (ref 1–4.8)
LYMPHOCYTES NFR BLD: 15.3 % (ref 22–41)
MAGNESIUM SERPL-MCNC: 1.6 MG/DL (ref 1.5–2.5)
MCH RBC QN AUTO: 28.9 PG (ref 27–33)
MCHC RBC AUTO-ENTMCNC: 33.1 G/DL (ref 33.6–35)
MCV RBC AUTO: 87.2 FL (ref 81.4–97.8)
MONOCYTES # BLD AUTO: 0.57 K/UL (ref 0–0.85)
MONOCYTES NFR BLD AUTO: 11.6 % (ref 0–13.4)
NEUTROPHILS # BLD AUTO: 2.86 K/UL (ref 2–7.15)
NEUTROPHILS NFR BLD: 58.4 % (ref 44–72)
NRBC # BLD AUTO: 0 K/UL
NRBC BLD-RTO: 0 /100 WBC
PLATELET # BLD AUTO: 206 K/UL (ref 164–446)
PMV BLD AUTO: 10.3 FL (ref 9–12.9)
POTASSIUM SERPL-SCNC: 3.5 MMOL/L (ref 3.6–5.5)
RBC # BLD AUTO: 3.74 M/UL (ref 4.2–5.4)
SODIUM SERPL-SCNC: 135 MMOL/L (ref 135–145)
VANCOMYCIN TROUGH SERPL-MCNC: 11.3 UG/ML (ref 10–20)
WBC # BLD AUTO: 4.9 K/UL (ref 4.8–10.8)

## 2019-06-08 PROCEDURE — A9270 NON-COVERED ITEM OR SERVICE: HCPCS | Performed by: INTERNAL MEDICINE

## 2019-06-08 PROCEDURE — 99233 SBSQ HOSP IP/OBS HIGH 50: CPT | Performed by: INTERNAL MEDICINE

## 2019-06-08 PROCEDURE — 700105 HCHG RX REV CODE 258: Performed by: INTERNAL MEDICINE

## 2019-06-08 PROCEDURE — 700102 HCHG RX REV CODE 250 W/ 637 OVERRIDE(OP): Performed by: NURSE PRACTITIONER

## 2019-06-08 PROCEDURE — 86480 TB TEST CELL IMMUN MEASURE: CPT

## 2019-06-08 PROCEDURE — 700111 HCHG RX REV CODE 636 W/ 250 OVERRIDE (IP): Performed by: INTERNAL MEDICINE

## 2019-06-08 PROCEDURE — 85025 COMPLETE CBC W/AUTO DIFF WBC: CPT

## 2019-06-08 PROCEDURE — 80202 ASSAY OF VANCOMYCIN: CPT

## 2019-06-08 PROCEDURE — 80048 BASIC METABOLIC PNL TOTAL CA: CPT

## 2019-06-08 PROCEDURE — 83735 ASSAY OF MAGNESIUM: CPT

## 2019-06-08 PROCEDURE — 700101 HCHG RX REV CODE 250: Performed by: NURSE PRACTITIONER

## 2019-06-08 PROCEDURE — 700102 HCHG RX REV CODE 250 W/ 637 OVERRIDE(OP): Performed by: INTERNAL MEDICINE

## 2019-06-08 PROCEDURE — A9270 NON-COVERED ITEM OR SERVICE: HCPCS | Performed by: HOSPITALIST

## 2019-06-08 PROCEDURE — A9585 GADOBUTROL INJECTION: HCPCS | Performed by: INTERNAL MEDICINE

## 2019-06-08 PROCEDURE — 700111 HCHG RX REV CODE 636 W/ 250 OVERRIDE (IP): Performed by: NURSE PRACTITIONER

## 2019-06-08 PROCEDURE — A9270 NON-COVERED ITEM OR SERVICE: HCPCS | Performed by: NURSE PRACTITIONER

## 2019-06-08 PROCEDURE — 770020 HCHG ROOM/CARE - TELE (206)

## 2019-06-08 PROCEDURE — 36415 COLL VENOUS BLD VENIPUNCTURE: CPT

## 2019-06-08 PROCEDURE — 700117 HCHG RX CONTRAST REV CODE 255: Performed by: INTERNAL MEDICINE

## 2019-06-08 PROCEDURE — 82962 GLUCOSE BLOOD TEST: CPT | Mod: 91

## 2019-06-08 PROCEDURE — 700102 HCHG RX REV CODE 250 W/ 637 OVERRIDE(OP): Performed by: HOSPITALIST

## 2019-06-08 PROCEDURE — 70553 MRI BRAIN STEM W/O & W/DYE: CPT

## 2019-06-08 RX ORDER — GADOBUTROL 604.72 MG/ML
6 INJECTION INTRAVENOUS ONCE
Status: COMPLETED | OUTPATIENT
Start: 2019-06-08 | End: 2019-06-08

## 2019-06-08 RX ADMIN — SODIUM CHLORIDE: 4.5 INJECTION, SOLUTION INTRAVENOUS at 05:06

## 2019-06-08 RX ADMIN — VANCOMYCIN HYDROCHLORIDE 800 MG: 100 INJECTION, POWDER, LYOPHILIZED, FOR SOLUTION INTRAVENOUS at 18:34

## 2019-06-08 RX ADMIN — OMEPRAZOLE 20 MG: 20 CAPSULE, DELAYED RELEASE ORAL at 05:05

## 2019-06-08 RX ADMIN — METOPROLOL TARTRATE 25 MG: 25 TABLET, FILM COATED ORAL at 05:05

## 2019-06-08 RX ADMIN — TEMAZEPAM 15 MG: 15 CAPSULE ORAL at 21:36

## 2019-06-08 RX ADMIN — GADOBUTROL 7.5 ML: 604.72 INJECTION INTRAVENOUS at 09:30

## 2019-06-08 RX ADMIN — SENNOSIDES AND DOCUSATE SODIUM 2 TABLET: 8.6; 5 TABLET ORAL at 05:05

## 2019-06-08 RX ADMIN — OXYCODONE HYDROCHLORIDE 10 MG: 10 TABLET ORAL at 08:41

## 2019-06-08 RX ADMIN — CEFEPIME 2 G: 2 INJECTION, POWDER, FOR SOLUTION INTRAVENOUS at 05:04

## 2019-06-08 RX ADMIN — LOSARTAN POTASSIUM 50 MG: 50 TABLET ORAL at 05:05

## 2019-06-08 RX ADMIN — SENNOSIDES AND DOCUSATE SODIUM 2 TABLET: 8.6; 5 TABLET ORAL at 17:52

## 2019-06-08 RX ADMIN — ENOXAPARIN SODIUM 40 MG: 100 INJECTION SUBCUTANEOUS at 05:04

## 2019-06-08 RX ADMIN — PRAMIPEXOLE DIHYDROCHLORIDE 1 MG: 0.5 TABLET ORAL at 17:52

## 2019-06-08 RX ADMIN — ACETAMINOPHEN 1000 MG: 500 TABLET ORAL at 05:05

## 2019-06-08 RX ADMIN — TRAMADOL HYDROCHLORIDE 50 MG: 50 TABLET, FILM COATED ORAL at 01:11

## 2019-06-08 RX ADMIN — AMLODIPINE BESYLATE 10 MG: 5 TABLET ORAL at 05:05

## 2019-06-08 RX ADMIN — PRAMIPEXOLE DIHYDROCHLORIDE 1 MG: 0.5 TABLET ORAL at 06:30

## 2019-06-08 RX ADMIN — MORPHINE SULFATE 4 MG: 4 INJECTION INTRAVENOUS at 02:38

## 2019-06-08 RX ADMIN — ACETAMINOPHEN 1000 MG: 500 TABLET ORAL at 17:53

## 2019-06-08 RX ADMIN — PRAMIPEXOLE DIHYDROCHLORIDE 1 MG: 0.5 TABLET ORAL at 11:39

## 2019-06-08 RX ADMIN — METOPROLOL TARTRATE 25 MG: 25 TABLET, FILM COATED ORAL at 17:53

## 2019-06-08 RX ADMIN — CEFEPIME 2 G: 2 INJECTION, POWDER, FOR SOLUTION INTRAVENOUS at 17:57

## 2019-06-08 RX ADMIN — CINACALCET HYDROCHLORIDE 60 MG: 30 TABLET, COATED ORAL at 05:06

## 2019-06-08 RX ADMIN — LIDOCAINE 2 PATCH: 50 PATCH CUTANEOUS at 11:39

## 2019-06-08 RX ADMIN — CYCLOBENZAPRINE 10 MG: 10 TABLET, FILM COATED ORAL at 21:40

## 2019-06-08 RX ADMIN — ATORVASTATIN CALCIUM 10 MG: 10 TABLET, FILM COATED ORAL at 17:53

## 2019-06-08 RX ADMIN — OXYCODONE HYDROCHLORIDE 10 MG: 10 TABLET ORAL at 21:36

## 2019-06-08 RX ADMIN — OXYCODONE HYDROCHLORIDE 10 MG: 10 TABLET ORAL at 12:38

## 2019-06-08 ASSESSMENT — ENCOUNTER SYMPTOMS
SHORTNESS OF BREATH: 0
CHILLS: 0
DIARRHEA: 0
FEVER: 0
ABDOMINAL PAIN: 0
MYALGIAS: 1
BLURRED VISION: 0
BLOOD IN STOOL: 0
CONSTIPATION: 0
HEADACHES: 0
NAUSEA: 0
DIZZINESS: 0
VOMITING: 0

## 2019-06-08 NOTE — PROGRESS NOTES
"   Orthopaedic Progress Note    Interval changes:  Patient doing well   No interval change  Dressing CDI  Cleared for DC to SNF by ortho pending medicine and ID team clearance     ROS - Patient denies any new issues.  Pain well controlled.    /71   Pulse 74   Temp 36.9 °C (98.5 °F) (Temporal)   Resp 18   Ht 1.575 m (5' 2\")   Wt 60.8 kg (134 lb 0.6 oz)   SpO2 95%       Patient seen and examined  No acute distress  Breathing non labored  RRR  Right hip surgical dressing is clean, dry, and intact. Patient clearly fires tibialis anterior, EHL, and gastrocnemius/soleus. Sensation is intact to light touch throughout superficial peroneal, deep peroneal, tibial, saphenous, and sural nerve distributions. Strong and palpable 2+ dorsalis pedis and posterior tibial pulses with capillary refill less than 2 seconds. No lower leg tenderness or discomfort.        Recent Labs      06/07/19   0055  06/08/19   0055   WBC  6.1  4.9   RBC  3.71*  3.74*   HEMOGLOBIN  10.4*  10.8*   HEMATOCRIT  31.6*  32.6*   MCV  85.2  87.2   MCH  28.0  28.9   MCHC  32.9*  33.1*   RDW  55.4*  57.2*   PLATELETCT  177  206   MPV  10.5  10.3       Active Hospital Problems    Diagnosis   • Sepsis (HCC) [A41.9]     Priority: High   • Abscess of right iliac muscle and right gluteus medius muscle [L02.91]     Priority: High   • Hypomagnesemia [E83.42]     Priority: Medium   • Chronic pain [G89.29]     Priority: Medium   • RLS (restless legs syndrome) [G25.81]     Priority: Medium   • Hypercalcemia [E83.52]     Priority: Low   • Essential hypertension [I10]     Priority: Low   • Chronic hyponatremia [E87.1]     Priority: Low   • History of breast cancer [Z85.3]     Priority: Low   • COPD (chronic obstructive pulmonary disease) (HCC) [J44.9]     Priority: Low   • History of DVT (deep vein thrombosis) [Z86.718]       Assessment/Plan:  Cleared for DC by ortho pending medicine and ID team clearance  POD#3 S/P Hardware removal, pelvis  Wt bearing status - " WBAT  Wound care/Drains - dressing changes every other day by nursing  Future Procedures - none planned   Lovenox: Start 6/6, Duration-until ambulatory > 150'  Sutures/Staples out- 10-14 days post operatively  PT/OT-initiated  Antibiotics: maxipime 2g IV BID, vanco 800mg IV QD  DVT Prophylaxis- TEDS/SCDs/Foot pumps  Sue-none  Case Coordination for Discharge Planning - Disposition pending abx needs

## 2019-06-08 NOTE — PROGRESS NOTES
Bedside report received from previous nurse regarding prior 12 hours.  Pt sitting up in bed.  Denies pain.  White board updated.  Call light within reach.

## 2019-06-08 NOTE — DISCHARGE PLANNING
Agency/Facility Name: [facility]   Spoke To: Krunal  Outcome: Bed available for patient today.     LSW Chelly notified.

## 2019-06-08 NOTE — PROGRESS NOTES
2 RN skin check complete with Afshan FENTON RN   Devices in place: None.  Skin assessed under devices n/a.  Confirmed pressure ulcers found on n/a.  The following interventions in place waffle mattress, prompt to turn.    Ears are pink and blanching from glasses  Elbows and torso are CDI with no signs of skin break down  Groin is pink/red, blanching; barrier cream being applied  Sacrum is pink and blanching; barrier cream being applied, prompt to turn in place.  Heels are pink and boggy; heels floated on pillows

## 2019-06-08 NOTE — DISCHARGE PLANNING
Agency/Facility Name: [facility]  Outcome: Left voice message for Krunal notifying him patient is not medically clear to transfer to facility.

## 2019-06-08 NOTE — PROGRESS NOTES
"   Orthopaedic Progress Note    Interval changes:  Patient doing well   Dressing CDI  Cleared for DC to SNF by ortho pending medicine and ID team clearance     ROS - Patient denies any new issues.  Pain well controlled.    /63   Pulse 80   Temp 36.2 °C (97.2 °F) (Temporal)   Resp 18   Ht 1.575 m (5' 2\")   Wt 61 kg (134 lb 7.7 oz)   SpO2 94%       Patient seen and examined  No acute distress  Breathing non labored  RRR  Right hip surgical dressing is clean, dry, and intact. Patient clearly fires tibialis anterior, EHL, and gastrocnemius/soleus. Sensation is intact to light touch throughout superficial peroneal, deep peroneal, tibial, saphenous, and sural nerve distributions. Strong and palpable 2+ dorsalis pedis and posterior tibial pulses with capillary refill less than 2 seconds. No lower leg tenderness or discomfort.        Recent Labs      06/05/19   0348  06/07/19   0055   WBC  13.7*  6.1   RBC  4.31  3.71*   HEMOGLOBIN  12.3  10.4*   HEMATOCRIT  37.0  31.6*   MCV  85.8  85.2   MCH  28.5  28.0   MCHC  33.2*  32.9*   RDW  59.2*  55.4*   PLATELETCT  212  177   MPV  9.9  10.5       Active Hospital Problems    Diagnosis   • Sepsis (HCC) [A41.9]     Priority: High   • Abscess of right iliac muscle and right gluteus medius muscle [L02.91]     Priority: High   • Hypomagnesemia [E83.42]     Priority: Medium   • Chronic pain [G89.29]     Priority: Medium   • RLS (restless legs syndrome) [G25.81]     Priority: Medium   • Hypercalcemia [E83.52]     Priority: Low   • Essential hypertension [I10]     Priority: Low   • Chronic hyponatremia [E87.1]     Priority: Low   • History of breast cancer [Z85.3]     Priority: Low   • COPD (chronic obstructive pulmonary disease) (HCC) [J44.9]     Priority: Low   • History of DVT (deep vein thrombosis) [Z86.718]       Assessment/Plan:  Cleared for DC by ortho pending medicine and ID team clearance  POD#2 S/P Hardware removal, pelvis  Wt bearing status - WBAT  Wound care/Drains " - dressing changes every other day by nursing  Future Procedures - none planned   Lovenox: Start 6/6, Duration-until ambulatory > 150'  Sutures/Staples out- 10-14 days post operatively  PT/OT-initiated  Antibiotics: maxipime 2g IV BID, vanco 800mg IV QD  DVT Prophylaxis- TEDS/SCDs/Foot pumps  Sue-none  Case Coordination for Discharge Planning - Disposition pending abx needs

## 2019-06-08 NOTE — PROGRESS NOTES
Infectious Disease Progress Note    Author: Geno Junior M.D. Date & Time of service: 6/8/2019  3:18 PM    Chief Complaint:  Brain abscess, iliopsoas abscess, leukopenia        Interval History:  68 y.o. female admitted 5/29/2019 as a transfer from [facility] [place].  She has bben there since 4/30/2019. + diabetes, SANTOSH of right hip with hardware, and breast cancer who was originally admitted to [facility] on 03/08/2019 for right hip and pelvic pain.  Work-up revealed a right iliopsoas lesion, gluteal abscess, and mult brain abscesses  5/6 Interval 24 hours of Vitals/Labs/Micro,and imaging results reviewed as available. See assessment.   5/10 AF WBC 3 continued c/o constipation denies HA, visual changes, neuro deficits  5/11 AF WBC 8.8 isolation stopped due to resolution neutropenia-sleepy today  5/12 AF no CBC somnolent-no acute events noted  5/13 AF WBC 6.2 c/o pain left hip today, unchanged Worse with movement and improved with ice. Refused PT yesterday   5/14 AF c/o dizzyness whenever she tirns on her side-no new HA or visual changes-still havng hip pain  5/15 AF sleeping at time of rounds-by report ambulating  5/16 AF weightbearing continues to improve-ambulating more.  No new complaints  5/20- still has some pain in the hip but ambulating without any issues. No fevers. In good spirits.  5/22/2019 continues to have hip pain.  No fevers have been noted  5/24 AF no CBC plan for CHAS today. Denies any headaches. States she is ambulating  5/25 afebrile CBC not done today.  Patient CHAS was negative.  She has a poor appetite and is requesting an appetite stimulant.  Ongoing left hip pain.  Plan for CT-guided drainage tomorrow  5/28 afebrile WBC 4.5.  Patient is resting comfortably.  She states that she has right breast pain.  Ultrasound of the breast otherwise unremarkable.  She is awaiting CT-guided drainage which has been delayed as CT scan malfunctioning yesterday.  5/29 afebrile, no CBC.  Patient denies any right  hip pain.  Continues to have left-sided hip pain especially with sitting. She had a repeat CT of the pelvis yesterday that revealed no changes in the abscess.  Per report, abscesses are too small for IR drainage given location and recommendation to transfer the patient to [facility] for CT-guided aspiration of the fluid for culture.  She continues to deny any fevers or chills.   AF c/o hungry and right hip pain Awaiting procedure. Denies SE abx  2019-no fevers.  Complains of pain in the hips.  Underwent aspiration of the pelvic abscess.  The cultures are negative  2019-no fevers.  Continues to complain of significant pain in her hips.  Pain medications are being adjusted.  2019-no fevers.  Continues to remain off the antibiotics.  Awaiting Ortho evaluation  2019 patient febrile up to 103.  Having shaking chills.  Is not feeling well.  Denies any specific complaints.  Labs Reviewed, Medications Reviewed, Radiology Reviewed and Wound Reviewed.   Interval 24 hours of Vitals/Labs/Micro,and imaging results reviewed as available. See assessment.    Interval 24 hours of Vitals/Labs/Micro,and imaging results reviewed as available. See assessment.  Patient denies headaches, sitting up in bed and appears to be mentating well.   Interval 24 hours of Vitals/Labs/Micro,and imaging results reviewed as available. See assessment.     Review of Systems:  Review of Systems   Constitutional: Negative for chills and fever.   Gastrointestinal: Negative for abdominal pain, constipation, diarrhea, nausea and vomiting.   Musculoskeletal: Positive for joint pain and myalgias.   Neurological: Negative for headaches.       Hemodynamics:  Temp (24hrs), Av.6 °C (97.8 °F), Min:36.2 °C (97.2 °F), Max:36.9 °C (98.4 °F)  Temperature: 36.6 °C (97.9 °F)  Pulse  Av.1  Min: 60  Max: 125  Blood Pressure : 131/70       Physical Exam:  Physical Exam   Constitutional: She is oriented to person, place, and time.  She appears well-developed.   HENT:   Head: Normocephalic and atraumatic.   Eyes: Pupils are equal, round, and reactive to light. Conjunctivae and EOM are normal.   Cardiovascular: Normal rate, regular rhythm and normal heart sounds.    Pulmonary/Chest: Effort normal and breath sounds normal.   Abdominal: Soft. Bowel sounds are normal. She exhibits no distension. There is no tenderness. There is no rebound and no guarding.   Musculoskeletal: She exhibits no edema.   Bandages on both hips, no obvious sign of infection   Neurological: She is alert and oriented to person, place, and time.   Skin: Skin is warm and dry.   Psychiatric: She has a normal mood and affect.       Meds:    Current Facility-Administered Medications:   •  vancomycin  •  acetaminophen  •  LORazepam  •  enoxaparin (LOVENOX) injection  •  tramadol  •  insulin regular **AND** Accu-Chek ACHS **AND** NOTIFY MD and PharmD **AND** glucose **AND** dextrose 10% bolus  •  MD Alert...Vancomycin per Pharmacy  •  cefepime  •  NS  •  pramipexole  •  lidocaine  •  oxyCODONE immediate-release  •  temazepam  •  morphine injection  •  cyclobenzaprine  •  Respiratory Care per Protocol  •  amLODIPine  •  cinacalcet  •  losartan  •  acetaminophen  •  atorvastatin  •  omeprazole  •  metoprolol  •  senna-docusate **AND** polyethylene glycol/lytes **AND** magnesium hydroxide **AND** bisacodyl  •  ondansetron  •  ondansetron    Labs:  Recent Labs      06/07/19 0055  06/08/19   0055   WBC  6.1  4.9   RBC  3.71*  3.74*   HEMOGLOBIN  10.4*  10.8*   HEMATOCRIT  31.6*  32.6*   MCV  85.2  87.2   MCH  28.0  28.9   RDW  55.4*  57.2*   PLATELETCT  177  206   MPV  10.5  10.3   NEUTSPOLYS  66.30  58.40   LYMPHOCYTES  11.50*  15.30*   MONOCYTES  11.00  11.60   EOSINOPHILS  10.50*  13.70*   BASOPHILS  0.50  0.80     Recent Labs      06/07/19   0055  06/08/19   0055   SODIUM  130*  135   POTASSIUM  2.9*  3.5*   CHLORIDE  99  102   CO2  24  23   GLUCOSE  127*  92   BUN  14  10      Recent Labs      06/07/19   0055  06/08/19   0055   ALBUMIN  2.9*   --    TBILIRUBIN  0.5   --    ALKPHOSPHAT  270*   --    TOTPROTEIN  6.0   --    ALTSGPT  17   --    ASTSGOT  32   --    CREATININE  0.39*  0.40*       Imaging:  Ct-drain-hematoma - Seroma    Result Date: 5/30/2019  HISTORY/REASON FOR EXAM:  68-year-old woman with multiple fluid collections adjacent to a chronic RIGHT iliac fracture site TECHNIQUE/EXAM DESCRIPTION: CT-guided RIGHT pelvic fluid collection aspiration. Low dose optimization technique was utilized for this CT exam including automated exposure control and adjustment of the mA and/or kV according to patient size. COMPARISON: CT 4/4/2019 MEDICATIONS: Moderate sedation was achieved with administration of 2 mg versed IV and 100 micrograms fentanyl IV. Sedation was administered for a total of 30 minutes and cardiorespiratory function was monitored by trained nursing staff throughout. PROCEDURE: The risks, benefits, goals and objectives and alternatives were discussed. Risks were specified as including but not limited to bleeding, infection, damage to vessels or nerves, pain and discomfort as well as nondiagnosis. The patient's questions were answered. Informed oral and written consent were obtained. The patient was appropriately positioned on the CT gantry. Initial CT imaging was performed and a site for percutaneously accessing the target lesion was selected and marked. The skin was prepped and draped in the usual sterile manner. A timeout was performed. Local anesthetic result was achieved with administration of1% lidocaine. Using CT fluoroscopic guidance a 17-gauge guiding needle was advanced to the target location. 2 mL of thick purulent fluid was aspirated. The needle was removed. Completion scanning was performed. The patient tolerated the procedure well with no evidence of complication.  The skin was cleaned and a dressing was applied. FINDINGS: Appropriate needle position in the  most cephalad collection. No hematoma at conclusion.     CT-guided RIGHT pelvic fluid collection aspiration, laboratory evaluation pending     Ct-pelvis With    Result Date: 5/28/2019 5/28/2019 12:54 PM HISTORY/REASON FOR EXAM: Follow-up abscess TECHNIQUE/EXAM DESCRIPTION AND NUMBER OF VIEWS: CT scan of the pelvis with contrast. Contrast-enhanced helical scanning of the pelvis was obtained from the iliac crests through the pubic symphysis following the bolus administration of 100 mL of Omnipaque 350 nonionic contrast without complication. Low dose optimization technique was utilized for this CT exam including automated exposure control and adjustment of the mA and/or kV according to patient size. COMPARISON: MRI scan of the pelvis 5/20/2019 and CT chest abdomen and pelvis 4/23/2019 FINDINGS: Again redemonstrated is a 3.2 x 2 cm multiloculated low-attenuation collection in the right iliacus muscle unchanged from recent MRI scan of the pelvis. There is also a 2.8 x 1.7 cm low-attenuation collection in the right gluteus medius muscle. This collection is also unchanged from recent MRI scan of the pelvis. Previous fixation screws are noted coursing through the right lateral ilium across the sacrum extending into the left lateral ilium. There is a healing fracture of the right posterior ilium. There is no evidence of free fluid in the pelvis or pelvic mass. There are scattered air-fluid levels throughout the small bowel     1.  Stable abscesses again redemonstrated in the right iliac's muscle and right gluteus medius muscle. 2.  Status post fixation screws coursing through the jose antonio and sacrum for treatment of healing fracture in the right posterior ilium. 3.  Adynamic ileus.    Dx-chest-limited (1 View)    Result Date: 6/4/2019 6/4/2019 2:53 PM HISTORY/REASON FOR EXAM:  Fever TECHNIQUE/EXAM DESCRIPTION AND NUMBER OF VIEWS: Single portable view of the chest. COMPARISON: 5/21/2019 FINDINGS: RIGHT upper extremity PICC is  in unchanged position. LEFT axillary surgical clips are redemonstrated. HEART: Stable size. LUNGS: Lung volumes are low. There is patchy LEFT basilar opacity. PLEURA: No effusion or pneumothorax.     LEFT basilar underinflation atelectasis or pneumonia    Dx-chest-portable (1 View)    Result Date: 6/4/2019 6/4/2019 8:01 PM HISTORY/REASON FOR EXAM:  Fever. History of cough TECHNIQUE/EXAM DESCRIPTION AND NUMBER OF VIEWS: Single portable view of the chest. COMPARISON: June 4, 2019 3:20 PM FINDINGS: Right-sided PICC line remains in place. There is interstitial prominence. No effusion or pneumothorax identified. There are bilateral breast implants.     Interstitial prominence could be due to pulmonary edema or hypoventilatory change.    Dx-chest-portable (1 View)    Result Date: 5/21/2019 5/21/2019 2:30 PM HISTORY/REASON FOR EXAM:  Right-sided neck pain. TECHNIQUE/EXAM DESCRIPTION AND NUMBER OF VIEWS: Single portable view of the chest. COMPARISON: 4/30/2019 FINDINGS: Single portable view of the chest demonstrates a normal cardiac silhouette and mediastinal contours. Calcification is in the aortic knob. No discrete opacity, pleural fluid, or pneumothorax. A right PICC line remains in place. The tip appears to be located at the cavoatrial junction.     1.  No acute cardiopulmonary findings. 2.  Right PICC line place with the tip projecting over the cavoatrial junction    Dx-pelvis-1 Or 2 Views    Result Date: 6/5/2019 6/5/2019 7:30 AM HISTORY/REASON FOR EXAM:  Main OR. Hardware removal of right SI joint TECHNIQUE/EXAM DESCRIPTION AND NUMBER OF VIEWS:  1 view(s) of the pelvis. COMPARISON:  None. FINDINGS: Single fluoroscopic image obtained during hardware removal the right SI joint. Fluoroscopy time: 2 minutes     Status post hardware removal from the right SI joint    Mr-brain-with & W/o    Result Date: 6/8/2019 6/8/2019 9:28 AM HISTORY/REASON FOR EXAM:  Brain abscesses or metastases from breast cancer. TECHNIQUE/EXAM  DESCRIPTION:   MRI of the brain without and with contrast. T1 sagittal, T2 fast spin-echo axial, FLAIR axial, T1 coronal, FLAIR coronal, Diffusion weighted and Apparent Diffusion Coefficient (ADC map) axial images were obtained of the whole brain. T1 post-contrast axial and T1 post-contrast coronal images were obtained. The study was performed on a [employer] 1.5 Miranda MRI scanner. 6 mL Gadavist contrast was administered intravenously. COMPARISON:  5/20/2019 FINDINGS: Innumerable supra and infratentorial intra-axial subcentimeter enhancing nodules with associated vasogenic edema are redemonstrated. The small size of these lesions makes direct measurement and comparison problematic due to technical factors. There is demonstrably increased edema in the RIGHT aaron and in association with the larger supratentorial lesions. A lesion in the RIGHT thalamus appears to be new since the prior study. None of these are associated with demonstrably true restricted diffusion. The calvariae are unremarkable.  There are no extra-axial fluid collections.  The ventricular system and basal cisterns are within normal limits. There is no herniation.  There are no hemorrhagic lesions.  The diffusion weighted axial images show no evidence of acute cerebral infarction. There is diffuse prominence of the CSF containing spaces. Vascular flow voids in the vertebrobasilar and carotid arteries, [place], and dural venous sinuses are intact. The paranasal sinuses and mastoids in the field of view are unremarkable.     1.  Interval progression of supra and infratentorial intra-axial nodules and associated edema. This short-term interval progression favors infection over neoplasm though both remain considerations. 2.  Mild atrophy    Mr-brain-with & W/o    Result Date: 5/21/2019 5/20/2019 3:37 PM HISTORY/REASON FOR EXAM:  Follow-up brain abscesses. TECHNIQUE/EXAM DESCRIPTION:   MRI of the brain without and with contrast. T1 sagittal, T2  fast spin-echo axial, T1 coronal, FLAIR coronal, diffusion-weighted and apparent diffusion coefficient (ADC map) axial images were obtained of the whole brain. T1 postcontrast axial and T1 postcontrast coronal images were obtained. The study was performed on a [employer] 1.5 Miranda MRI scanner. 6 mL Gadavist contrast was administered intravenously. COMPARISON:  MRI scan of the brain 4/24/2019 FINDINGS: There are innumerable small ovoid enhancing foci throughout the brain parenchyma, both in the supratentorial and infratentorial compartments. The number of these lesions has increased significantly since previous exam. The previously identified largest lesion in the right brachium pontis has resolved. There is evidence of increased T2 signal intensity in many of these lesions and there is surrounding increased T2 signal intensity in several lesions in the right superior frontal lobe. The calvariae are unremarkable. There are no extra-axial fluid collections. The ventricular system and basal cisterns are mild to moderately prominent. There is mild-to-moderate prominence of the cortical sulci and gyri. There are no mass effects or shift of midline structures. There are no hemorrhagic lesions. The diffusion-weighted axial images show no evidence of acute cerebral infarction. The brainstem and posterior fossa structures are unremarkable. Vascular flow voids in the vertebrobasilar and carotid arteries, [employer] [place], and dural venous sinuses are intact. The paranasal sinuses and mastoids in the field of view are unremarkable.     1.  Increase in number of innumerable small ovoid enhancing lesions throughout the brain parenchyma. These lesions may represent microabscesses of either bacterial or fungal origin or possibly brain metastases. 2.  Age-related cerebral atrophy.    Mr-pelvis-with & W/o And Sequences    Result Date: 5/21/2019 5/20/2019 3:37 PM HISTORY/REASON FOR EXAM:  Abd pain, fever, abscess suspected; evaluate  abscesses TECHNIQUE/EXAM DESCRIPTION: MRI of the pelvis without and with contrast. The study was performed on a [employer] 1.5 Miranda MRI scanner. T1 axial, T1 coronal, T2 fast spin-echo fat-suppressed axial, and T2 fast spin-echo fat-suppressed coronal images were obtained of the pelvis. Postcontrast fat-suppressed intravenous gadolinium enhanced sequences in the axial and coronal planes were then  performed. 6 mL Gadavist contrast was administered intravenously. COMPARISON: 3/26/2019. CT 4/23/2019 FINDINGS: Interval decrease in size of the collection in the right gluteal medius muscle, measuring 1.4 x 1.9 cm, previously 2.3 x 2.8 cm. There is minimal surrounding stranding. Mild heterogeneity and stranding in the overlying right gluteus cornelio muscle without discrete collection. Grossly unchanged in size of the multiloculated collection in the right iliacus muscle, measuring 2.4 x 3.5 cm. Postsurgical change in the sacrum with susceptibility artifact from the screws. Moderate osteoarthritis of the bilateral hip joints.     1. Interval decrease in size of the collection in the right gluteal medius muscle, measuring 1.4 x 1.9 cm, previously 2.3 x 2.8 cm. There is minimal surrounding stranding. Mild heterogeneity and stranding in the overlying right gluteus cornelio muscle without discrete collection, could relate to reactive change or phlegmon. 2. Grossly unchanged in size of the multiloculated collection in the right iliacus muscle, measuring 2.4 x 3.5 cm.    Us-chest    Result Date: 5/27/2019 5/27/2019 10:23 AM HISTORY/REASON FOR EXAM:  Right lateral breast pain, evaluate implant TECHNIQUE/EXAM DESCRIPTION AND NUMBER OF VIEWS: Targeted ultrasound of the right breast. COMPARISON: None FINDINGS: No gross mass or implant rupture are identified. Diagnostic assessment is not feasible lack of proper equipment tube performed breast imaging     1.  Nondiagnostic assessment of the right breast without gross implant rupture  identified 2.  Recommend further assessment with mammography and ultrasound at an accredited breast facility    Dx-portable Fluoroscopy < 1 Hour    Result Date: 2019 7:30 AM HISTORY/REASON FOR EXAM:  Right S1 screws removal, Main OR TECHNIQUE/EXAM DESCRIPTION AND NUMBER OF VIEWS: Portable fluoroscopy for less than one hour FINDINGS:      The portable fluoroscopy unit was obligated to the procedure for less than one hour.   Actual fluoro time was 2 minutes.     Portable fluoroscopy utilized for 2 minutes.    Ec-halie W/o Cont    Result Date: 2019  Transesophageal Echo Report Echocardiography Laboratory CONCLUSIONS Normal esophageal echocardiogram. No evidence of endocarditis. No significant change since the prior study of 3/15/2019. SHARIF CLIFFORDA Exam Date:          2019                     12:48 Exam Location:      Inpatient Priority:            Routine Ordering Physician:        DARON WHELAN Referring Physician: Sonographer:               CATALINO Rivers Age:    68     Gender:    F MRN:    [id number] :    [id number] BSA:           Ht (in):           Wt (lb): Report Type:      Complete Indications:     Endocarditis and heart valve disorders in diseases                  classified elsewhere ICD Codes:       I39 CPT Codes:       69654 BP:          /          HR: Technical Quality:       Fair MEASUREMENTS  (Male / Female) Normal Values 2D ECHO Estimated LV Ejection Fraction    65 %                  * Indicates values subject to auto-interpretation LV EF:  65    % Medications Medications determined by anesthesiologist. Limitations none Complications none Proc. Components The probe was inserted and manipulated by Dr Whelan. Probe #SM  was used for this procedure. 2D, color Doppler, spectral Doppler, and 3D imaging were used as part of the evaluation as clinically indicated. FINDINGS Left Ventricle The left ventricle was  normal in size and function. Right Ventricle The right ventricle was normal in size and function. Right Atrium The right atrium is normal in size. Left Atrium The left atrium is normal in size. LA Appendage Normal left atrial appendage. IA Septum The interatrial septum is normal. IV Septum The interventricular septum is normal. Mitral Valve Structurally normal mitral valve without significant stenosis or regurgitation.  No valvular vegetations. Aortic Valve Structurally normal aortic valve without significant stenosis or regurgitation.  No valvular vegetations. Tricuspid Valve Structurally normal tricuspid valve without significant stenosis or regurgitation.  No valvular vegetations. Pulmonic Valve Structurally normal pulmonic valve without significant stenosis or regurgitation.  No valvular vegetations. Pericardium Normal pericardium without effusion. Aorta The aortic root is normal. Christopher Potter MD (Electronically Signed) Final Date:     24 May 2019 15:42      Micro:  Results     Procedure Component Value Units Date/Time    Cryptococcal Antigen Titer [[id number]]     Order Status:  No result Specimen:  Blood from Blood     Cryptococcal Antigen [[id number]]     Order Status:  No result Specimen:  Blood     C Diff by PCR rflx Toxin [[id number]] Collected:  06/06/19 0900    Order Status:  Completed Specimen:  Stool from Stool Updated:  06/06/19 1541     C Diff by PCR Negative     Comment: C. difficile NOT detected by PCR.  Treatment not indicated per guidelines.  Repeat testing not indicated within 7 days.          027-NAP1-BI Presumptive Negative     Comment: Presumptive 027/NAP1/BI target DNA sequences are NOT DETECTED.       Narrative:       Special Contact [id number] MERYL PICKENS  Does this patient have risk factors for C-diff?->Yes    BLOOD CULTURE [[id number]] Collected:  06/04/19 1512    Order Status:  Completed Specimen:  Blood from Peripheral Updated:  06/05/19 0855     Significant Indicator NEG  "    Source BLD     Site PERIPHERAL     Culture Result No Growth  Note: Blood cultures are incubated for 5 days and  are monitored continuously.Positive blood cultures  are called to the RN and reported as soon as  they are identified.      Narrative:       Per Hospital Policy: Only change Specimen Src: to \"Line\" if  specified by physician order.  Left Forearm/Arm    BLOOD CULTURE [[id number]] Collected:  06/04/19 1512    Order Status:  Completed Specimen:  Blood from Peripheral Updated:  06/05/19 0855     Significant Indicator NEG     Source BLD     Site PERIPHERAL     Culture Result No Growth  Note: Blood cultures are incubated for 5 days and  are monitored continuously.Positive blood cultures  are called to the RN and reported as soon as  they are identified.      Narrative:       Per Hospital Policy: Only change Specimen Src: to \"Line\" if  specified by physician order.  Left Hand    Culture Respiratory W/ GRM STN [[id number]]     Order Status:  Completed Specimen:  Respirate from Sputum     URINALYSIS [[id number]]     Order Status:  No result Specimen:  Urine     BLOOD CULTURE [[id number]] Collected:  06/04/19 0000    Order Status:  Canceled Specimen:  Other from Peripheral     BLOOD CULTURE [[id number]] Collected:  06/04/19 0000    Order Status:  Canceled Specimen:  Other from Peripheral     FLUID CULTURE W/GRAM STAIN [[id number]] Collected:  05/30/19 1339    Order Status:  Completed Specimen:  Body Fluid Updated:  06/03/19 0726     Significant Indicator NEG     Source BF     Site pelvic abcess     Culture Result No growth at 4 days.     Gram Stain Result Few WBCs.  No organisms seen.            Assessment:  Active Hospital Problems    Diagnosis   • *Abscess of right iliac muscle and right gluteus medius muscle [L02.91]   • Sepsis (HCC) [A41.9]   • Hypomagnesemia [E83.42]   • Chronic pain [G89.29]   • RLS (restless legs syndrome) [G25.81]   • Hypercalcemia [E83.52]   • Essential hypertension [I10]   • Chronic " "hyponatremia [E87.1]   • History of breast cancer [Z85.3]   • COPD (chronic obstructive pulmonary disease) (HCC) [J44.9]   • History of DVT (deep vein thrombosis) [Z86.718]        Assessment:  Gluteal and iliopsoas abscess  Brain abscesses vs mets  Breast cancer  DM     Interval 24 hour assessment:    AF, O2 RA,    Labs reviewed  Imaging personally reviewed both images and report.   Studies reviewed  Micro reviewed    Pt reporting pain in both hips.  She is denying headaches and appears to be mentating well.       Plan:     New fevers. 101 on 6/4 -now improved  Uncertain etiology, may be related to known abscesses (see below) or central fever secondary to potential brain mets/abscesses  Panculture again-blood cultures on 6/4 with no growth to date  Pull the PICC line  Went to the OR as below on 6/6      Leukocytosis, new  -  likely secondary to surgery - improved      Gluteal and iliopsoas abscess s/p treatment.    Adjacent to hardware in right hip (placed 12/17/18)  CT 4/23 \"Fluid collections within the right gluteal and iliacis muscles.. The collection within the right gluteal muscle has unchanged while the fluid collection within the right iliacis muscle is increased somewhat in size.\" 2.7 cm  Cultures 3/29 and 4/4 neg  MRI on 5/20/2019 - interval decrease in collection in R gluteal muscle and persistent multiloculated collection in R iliacus muscle.   CT 5/28 no change  Underwent drainage on 5/30/2019-cultures are negative  ESR is 32 and CRP has come down to 0.98 from 3.53  OR on 6/6 with removal of hardware from R hip- no cultures were sent      --- Blood cultures remain negative and no OR cultures were sent per microbiology   --- She has had an extended course of antibiotics with vancomycin and cefepime, see below  --- CT pelvis on 5/28 with stable abscess  --- Will continue cefepime and vancomycin while awaiting CT pelvis   --- Recommend repeat CT pelvis with contrast    Brain abscesses vs mets  - MRI 4/23 " "\"supra and infratentorial brain parenchyma... interval decrease in the size of the lesions. There are also interval reduction in the extent of the surrounding white matter edema in the restricted diffusion consistent with improvement/treatment response of the most of the multifocal brain abscesses.  - Repeat MRI 5/21 showed innumerable microabscesses vs mets -this change occurred while she was still on broad-spectrum antibiotics indicating this is either not an infectious process or an infectious etiology that is not covered by either vancomycin, cefepime or Flagyl  CHAS neg 3/15 and 5/24  No benefit of PET scan per notes-may need to revisit this  Abx (vancomycin, cefepime and Flagyl) discontinued on 5/23 after ~8 weeks of treatment-developed new fevers on 6/4, uncertain etiology  HIV negative     --- Concern her brain abscess may be metastasis versus unusual infectious etiology  --- Initiated work-up for non-common bacterial causes-cryptococcus antigen, cocci, beta D glucan and galactomannan sent  --- Recommend repeat MRI brain with contrast-we have no confirmed explanation for her innumerable microabscesses reported on 5/21-need to assess for improvement versus worsening and if further work-up is needed-  --- Repeat MRI on 6/7 with interval progression of supra and infratentorial intra--axial nodules and associated edema.  Appears to be new right thalamus lesion. Radiology favors infection over neoplasm though both remain considerations    --- Requested neurosurgical evaluation to see if any of the abscesses are amenable to biopsy as we have no clear diagnosis, she has failed multiple weeks of broad-spectrum antibiotics and the process appears to be continuing and worsening     History of pelvic fracture  Status post pin placement and sacral  Eventually will need removal     Type 2 DM  Hemoglobin A1c 6.3   Keep BS under 150 to help control current infection     Diarrhea  -C. difficile negative on 6/6     Plan to " discharge to skilled facility.     Discussed IM.  ID will follow.         Geno Junior MD  Infectious Diseases

## 2019-06-08 NOTE — FACE TO FACE
Face to Face Note  -  Durable Medical Equipment    Dipak Almonte M.D. - NPI: [id number]  I certify that this patient is under my care and that they had a durable medical equipment(DME)face to face encounter by myself that meets the physician DME face-to-face encounter requirements with this patient on:    Date of encounter:   Patient:                    MRN:                       YOB: 2021  Muriel Martini  [id number]  1950     The encounter with the patient was in whole, or in part, for the following medical condition, which is the primary reason for durable medical equipment:  Other - lung cancer, hemoptypsis    I certify that, based on my findings, the following durable medical equipment is medically necessary:  Oxygen.    HOME O2 Saturation Measurements:(Values must be present for Home Oxygen orders)         ,     ,         My Clinical findings support the need for the above equipment due to:  Hypoxia    Supporting Symptoms: Patient is hypoxic on RA and will be flying home at high altitude to [place] for treatment of his lung cancer

## 2019-06-08 NOTE — CARE PLAN
Problem: Skin Integrity  Goal: Risk for impaired skin integrity will decrease  Outcome: PROGRESSING AS EXPECTED  Patient able to turn independently.     Problem: Urinary Elimination:  Goal: Ability to reestablish a normal urinary elimination pattern will improve  Outcome: PROGRESSING AS EXPECTED  Patient able to use call light to let staff know when she needs to use the bed pan.

## 2019-06-08 NOTE — DISCHARGE PLANNING
Anticipated Discharge Disposition: SNF    Action: [facility] SNF can accept the pt when medically cleared. This writer spoke to attending who reports the pt is not medically cleared at this time as further medical intervention is needed.     Barriers to Discharge: Wait medical clearance    Plan: Pt is projected to discharge to [facility] when medically cleared.

## 2019-06-08 NOTE — DISCHARGE PLANNING
Anticipated Discharge Disposition: SNF     Action: This writer left message for [facility] asking if [facility] can accept the pt today.     Barriers to Discharge: Waiting for accepting SNF.     Plan: Wait for SNF to accept the pt.

## 2019-06-08 NOTE — PROGRESS NOTES
"Pharmacy Kinetics 68 y.o. female on vancomycin day # 5    2019    Currently on Vancomycin 800 mg iv q24hr (1800)     Indication for Treatment: SSTI,  brain abscess     Pertinent history per medical record: Admitted on 2019 for pelvic abscess. Patient was recently admitted to [facility] from 3/8- for fever. She was found to have possible brain abscess as well as iliopsoas abscess. ID was consulted during that admission and she was treated with meropenem and vancomycin, with plans for 6 weeks of antibiotics. She eventually transferred to [facility] for completion of antibiotics. CT scan on  again demonstrated pelvic abscess, prompting return to [facility] for I&D, which was performed with IR on . She had removal of possibly infected hardware on . MRI showed increase in the number of innumerable small ovoid enhancing lesions throughout the brain parenchyma (possible brain abscess vs metastatic lesions). ID is following.      Other antibiotics: cefepime 2 g IV q8h     Allergies: Patient has no known allergies.      List concerns for renal function: age     Pertinent cultures to date:   19: C.Diff: Negative  19: PBCx2: NGTD  19: Pelvic abscess,BF: NGTD     MRSA nares swab if pneumonia is a concern (ordered/positive/negative/n-a): NA    Recent Labs      19   0055  19   0055   WBC  6.1  4.9   NEUTSPOLYS  66.30  58.40     Recent Labs      19   0055  19   0055   BUN  14  10   CREATININE  0.39*  0.40*   ALBUMIN  2.9*   --      Recent Labs      19   2147   VANCOTROUGH  14.4     Intake/Output Summary (Last 24 hours) at 19 1331  Last data filed at 19 0400   Gross per 24 hour   Intake          1489.13 ml   Output                0 ml   Net          1489.13 ml      /70   Pulse 77   Temp 36.6 °C (97.9 °F) (Temporal)   Resp 18   Ht 1.575 m (5' 2\")   Wt 60.8 kg (134 lb 0.6 oz)   SpO2 95%  Temp (24hrs), Av.6 °C (97.8 °F), Min:36.2 °C (97.2 °F), Max:36.9 " °C (98.4 °F)       A/P        1. Vancomycin dose change: No change  2. Next vancomycin level: 06/08/19@1700  3. Goal trough: 18-20 mcg/mL   4. Comments: No leukocytosis , afebrile, cx NGTD. Renal indices stable over interval. Last trough level below goal , maintenance dose increased and follow up level ordered. ID following , continue current plan.      Willy CandelariaD BCPS

## 2019-06-09 ENCOUNTER — APPOINTMENT (OUTPATIENT)
Dept: RADIOLOGY | Facility: MEDICAL CENTER | Age: 69
DRG: 356 | End: 2019-06-09
Attending: INTERNAL MEDICINE
Payer: MEDICARE

## 2019-06-09 LAB
1 3 BETA D GLUCAN INTERP Q4483: NEGATIVE
1,3 BETA GLUCAN SER-MCNC: 55 PG/ML
ANION GAP SERPL CALC-SCNC: 7 MMOL/L (ref 0–11.9)
BACTERIA BLD CULT: NORMAL
BACTERIA BLD CULT: NORMAL
BASOPHILS # BLD AUTO: 1 % (ref 0–1.8)
BASOPHILS # BLD: 0.05 K/UL (ref 0–0.12)
BUN SERPL-MCNC: 7 MG/DL (ref 8–22)
CALCIUM SERPL-MCNC: 7.4 MG/DL (ref 8.5–10.5)
CHLORIDE SERPL-SCNC: 103 MMOL/L (ref 96–112)
CO2 SERPL-SCNC: 25 MMOL/L (ref 20–33)
CREAT SERPL-MCNC: 0.38 MG/DL (ref 0.5–1.4)
EOSINOPHIL # BLD AUTO: 0.39 K/UL (ref 0–0.51)
EOSINOPHIL NFR BLD: 7.8 % (ref 0–6.9)
ERYTHROCYTE [DISTWIDTH] IN BLOOD BY AUTOMATED COUNT: 55.8 FL (ref 35.9–50)
GAMMA INTERFERON BACKGROUND BLD IA-ACNC: 0.04 IU/ML
GLUCOSE BLD-MCNC: 117 MG/DL (ref 65–99)
GLUCOSE BLD-MCNC: 82 MG/DL (ref 65–99)
GLUCOSE BLD-MCNC: 91 MG/DL (ref 65–99)
GLUCOSE BLD-MCNC: 93 MG/DL (ref 65–99)
GLUCOSE SERPL-MCNC: 79 MG/DL (ref 65–99)
HCT VFR BLD AUTO: 32.5 % (ref 37–47)
HGB BLD-MCNC: 10.7 G/DL (ref 12–16)
IMM GRANULOCYTES # BLD AUTO: 0.01 K/UL (ref 0–0.11)
IMM GRANULOCYTES NFR BLD AUTO: 0.2 % (ref 0–0.9)
LYMPHOCYTES # BLD AUTO: 0.95 K/UL (ref 1–4.8)
LYMPHOCYTES NFR BLD: 19 % (ref 22–41)
M TB IFN-G BLD-IMP: POSITIVE
M TB IFN-G CD4+ BCKGRND COR BLD-ACNC: 0.42 IU/ML
MCH RBC QN AUTO: 28.1 PG (ref 27–33)
MCHC RBC AUTO-ENTMCNC: 32.9 G/DL (ref 33.6–35)
MCV RBC AUTO: 85.3 FL (ref 81.4–97.8)
MITOGEN IGNF BCKGRD COR BLD-ACNC: 5.2 IU/ML
MONOCYTES # BLD AUTO: 0.64 K/UL (ref 0–0.85)
MONOCYTES NFR BLD AUTO: 12.8 % (ref 0–13.4)
NEUTROPHILS # BLD AUTO: 2.96 K/UL (ref 2–7.15)
NEUTROPHILS NFR BLD: 59.2 % (ref 44–72)
NRBC # BLD AUTO: 0 K/UL
NRBC BLD-RTO: 0 /100 WBC
PLATELET # BLD AUTO: 214 K/UL (ref 164–446)
PMV BLD AUTO: 9.6 FL (ref 9–12.9)
POTASSIUM SERPL-SCNC: 3.6 MMOL/L (ref 3.6–5.5)
QFT TB2 - NIL TBQ2: 0.18 IU/ML
RBC # BLD AUTO: 3.81 M/UL (ref 4.2–5.4)
SIGNIFICANT IND 70042: NORMAL
SIGNIFICANT IND 70042: NORMAL
SITE SITE: NORMAL
SITE SITE: NORMAL
SODIUM SERPL-SCNC: 135 MMOL/L (ref 135–145)
SOURCE SOURCE: NORMAL
SOURCE SOURCE: NORMAL
WBC # BLD AUTO: 5 K/UL (ref 4.8–10.8)

## 2019-06-09 PROCEDURE — 700117 HCHG RX CONTRAST REV CODE 255: Performed by: INTERNAL MEDICINE

## 2019-06-09 PROCEDURE — 700102 HCHG RX REV CODE 250 W/ 637 OVERRIDE(OP): Performed by: INTERNAL MEDICINE

## 2019-06-09 PROCEDURE — 72193 CT PELVIS W/DYE: CPT

## 2019-06-09 PROCEDURE — 700111 HCHG RX REV CODE 636 W/ 250 OVERRIDE (IP): Performed by: INTERNAL MEDICINE

## 2019-06-09 PROCEDURE — A9270 NON-COVERED ITEM OR SERVICE: HCPCS | Performed by: HOSPITALIST

## 2019-06-09 PROCEDURE — 36415 COLL VENOUS BLD VENIPUNCTURE: CPT

## 2019-06-09 PROCEDURE — 99233 SBSQ HOSP IP/OBS HIGH 50: CPT | Performed by: INTERNAL MEDICINE

## 2019-06-09 PROCEDURE — 700101 HCHG RX REV CODE 250: Performed by: NURSE PRACTITIONER

## 2019-06-09 PROCEDURE — 700105 HCHG RX REV CODE 258: Performed by: INTERNAL MEDICINE

## 2019-06-09 PROCEDURE — A9270 NON-COVERED ITEM OR SERVICE: HCPCS | Performed by: INTERNAL MEDICINE

## 2019-06-09 PROCEDURE — 80048 BASIC METABOLIC PNL TOTAL CA: CPT

## 2019-06-09 PROCEDURE — 85025 COMPLETE CBC W/AUTO DIFF WBC: CPT

## 2019-06-09 PROCEDURE — 700102 HCHG RX REV CODE 250 W/ 637 OVERRIDE(OP): Performed by: HOSPITALIST

## 2019-06-09 PROCEDURE — 770001 HCHG ROOM/CARE - MED/SURG/GYN PRIV*

## 2019-06-09 PROCEDURE — A9270 NON-COVERED ITEM OR SERVICE: HCPCS | Performed by: NURSE PRACTITIONER

## 2019-06-09 PROCEDURE — 82962 GLUCOSE BLOOD TEST: CPT | Mod: 91

## 2019-06-09 PROCEDURE — 700102 HCHG RX REV CODE 250 W/ 637 OVERRIDE(OP): Performed by: NURSE PRACTITIONER

## 2019-06-09 RX ORDER — FLUCONAZOLE 200 MG/1
800 TABLET ORAL DAILY
Status: DISCONTINUED | OUTPATIENT
Start: 2019-06-09 | End: 2019-06-24

## 2019-06-09 RX ORDER — FLUCONAZOLE 100 MG/1
400 TABLET ORAL DAILY
Status: DISCONTINUED | OUTPATIENT
Start: 2019-06-09 | End: 2019-06-09

## 2019-06-09 RX ORDER — SODIUM CHLORIDE 9 MG/ML
INJECTION, SOLUTION INTRAVENOUS CONTINUOUS
Status: DISPENSED | OUTPATIENT
Start: 2019-06-09 | End: 2019-06-09

## 2019-06-09 RX ADMIN — ACETAMINOPHEN 1000 MG: 500 TABLET ORAL at 05:55

## 2019-06-09 RX ADMIN — ACETAMINOPHEN 1000 MG: 500 TABLET ORAL at 17:29

## 2019-06-09 RX ADMIN — PRAMIPEXOLE DIHYDROCHLORIDE 1 MG: 0.5 TABLET ORAL at 11:37

## 2019-06-09 RX ADMIN — ATORVASTATIN CALCIUM 10 MG: 10 TABLET, FILM COATED ORAL at 17:29

## 2019-06-09 RX ADMIN — VANCOMYCIN HYDROCHLORIDE 700 MG: 100 INJECTION, POWDER, LYOPHILIZED, FOR SOLUTION INTRAVENOUS at 11:41

## 2019-06-09 RX ADMIN — TEMAZEPAM 15 MG: 15 CAPSULE ORAL at 20:27

## 2019-06-09 RX ADMIN — PRAMIPEXOLE DIHYDROCHLORIDE 1 MG: 0.5 TABLET ORAL at 17:29

## 2019-06-09 RX ADMIN — CINACALCET HYDROCHLORIDE 60 MG: 30 TABLET, COATED ORAL at 05:56

## 2019-06-09 RX ADMIN — SODIUM CHLORIDE: 9 INJECTION, SOLUTION INTRAVENOUS at 08:04

## 2019-06-09 RX ADMIN — METOPROLOL TARTRATE 25 MG: 25 TABLET, FILM COATED ORAL at 05:55

## 2019-06-09 RX ADMIN — OXYCODONE HYDROCHLORIDE 10 MG: 10 TABLET ORAL at 12:21

## 2019-06-09 RX ADMIN — AMLODIPINE BESYLATE 10 MG: 5 TABLET ORAL at 05:57

## 2019-06-09 RX ADMIN — CYCLOBENZAPRINE 10 MG: 10 TABLET, FILM COATED ORAL at 20:27

## 2019-06-09 RX ADMIN — ENOXAPARIN SODIUM 40 MG: 100 INJECTION SUBCUTANEOUS at 05:54

## 2019-06-09 RX ADMIN — CEFEPIME 2 G: 2 INJECTION, POWDER, FOR SOLUTION INTRAVENOUS at 17:30

## 2019-06-09 RX ADMIN — SENNOSIDES AND DOCUSATE SODIUM 2 TABLET: 8.6; 5 TABLET ORAL at 05:54

## 2019-06-09 RX ADMIN — FLUCONAZOLE 800 MG: 200 TABLET ORAL at 17:39

## 2019-06-09 RX ADMIN — LIDOCAINE 2 PATCH: 50 PATCH CUTANEOUS at 11:46

## 2019-06-09 RX ADMIN — CEFEPIME 2 G: 2 INJECTION, POWDER, FOR SOLUTION INTRAVENOUS at 05:30

## 2019-06-09 RX ADMIN — OXYCODONE HYDROCHLORIDE 10 MG: 10 TABLET ORAL at 08:04

## 2019-06-09 RX ADMIN — LOSARTAN POTASSIUM 50 MG: 50 TABLET ORAL at 05:57

## 2019-06-09 RX ADMIN — METOPROLOL TARTRATE 25 MG: 25 TABLET, FILM COATED ORAL at 17:29

## 2019-06-09 RX ADMIN — OMEPRAZOLE 20 MG: 20 CAPSULE, DELAYED RELEASE ORAL at 05:57

## 2019-06-09 RX ADMIN — PRAMIPEXOLE DIHYDROCHLORIDE 1 MG: 0.5 TABLET ORAL at 07:28

## 2019-06-09 RX ADMIN — OXYCODONE HYDROCHLORIDE 10 MG: 10 TABLET ORAL at 02:11

## 2019-06-09 RX ADMIN — SENNOSIDES AND DOCUSATE SODIUM 2 TABLET: 8.6; 5 TABLET ORAL at 17:29

## 2019-06-09 RX ADMIN — IOHEXOL 100 ML: 350 INJECTION, SOLUTION INTRAVENOUS at 09:20

## 2019-06-09 ASSESSMENT — ENCOUNTER SYMPTOMS
MYALGIAS: 1
DIARRHEA: 0
NAUSEA: 0
PSYCHIATRIC NEGATIVE: 1
FEVER: 0
MYALGIAS: 0
CONSTIPATION: 1
BLOOD IN STOOL: 0
VOMITING: 0
CHILLS: 0
ABDOMINAL PAIN: 0
BLURRED VISION: 0
SHORTNESS OF BREATH: 0
HEADACHES: 0
DIZZINESS: 0

## 2019-06-09 NOTE — PROGRESS NOTES
[facility] Medicine Daily Progress Note    Date of Service  6/8/2019    Chief Complaint  68 y.o. female admitted 5/29/2019 with persistent abscess.    Hospital Course     Ms. Martini is a 68-year-old female who was transferred from [facility] on 5/29/2019 with persistent right gluteal abscesses since sacral fracture repair in December.  She has had multiple IR drainage.  She initially presented with pelvic pain, low back pain, and confusion. She also had new slurred speech. Imaging of the abdomen, pelvis and brain revealed abscesses and she was treated with a full course of IV antibiotics per ID.  Her slurred speech improved and she was able to ambulate with physical therapy. However, repeat imaging showed persistent pelvic abscesses.  MRI brain shows increase in brain lesions.  Her brain lesions are known from prior imaging and there is concern they represent metastasis.  Oncology aware and do not recommend further imaging. Cultures remained negative and a CHAS done by Dr. Potter showed no vegetations. The size of the pelvic abscesses was reviewed by interventional radiology and the case was discussed with Dr. Finley who recommended transfer to [facility] for CT guided drainage.  S/P drainage on 5/30.    hardware removal 6/5.       Interval Problem Update  No more diarrhea    Consultants/Specialty  Infectious disease  Orthopedic surgery    Code Status  DNR/DNI    Disposition  Anticipate SNF need.    Review of Systems  Review of Systems   Constitutional: Negative for fever.   HENT: Negative for ear pain.    Eyes: Negative for blurred vision.   Respiratory: Negative for shortness of breath.    Cardiovascular: Negative for chest pain.   Gastrointestinal: Negative for abdominal pain, blood in stool, constipation, diarrhea, melena and vomiting.   Genitourinary: Negative for dysuria, hematuria and urgency.   Musculoskeletal: Positive for joint pain and myalgias.   Skin: Negative for rash.   Neurological: Negative for dizziness and  headaches.   All other systems reviewed and are negative.       Physical Exam  Temp:  [36.2 °C (97.2 °F)-36.9 °C (98.5 °F)] 36.9 °C (98.5 °F)  Pulse:  [70-87] 74  Resp:  [16-18] 18  BP: (130-142)/(63-74) 142/71  SpO2:  [91 %-95 %] 95 %    I performed the physical exam today, 06/08/19, and compared to yesterday, 06/07/2019, no changes except for that noted below:  Physical Exam   Constitutional: She is oriented to person, place, and time. She appears well-developed and well-nourished. No distress.   HENT:   Head: Normocephalic and atraumatic.   Eyes: Conjunctivae are normal. No scleral icterus.   Neck: Neck supple.   Cardiovascular: Normal rate, regular rhythm and normal heart sounds.  Exam reveals no gallop and no friction rub.    No murmur heard.  Pulmonary/Chest: Effort normal and breath sounds normal. No respiratory distress. She has no wheezes. She has no rales.   Abdominal: Soft. Bowel sounds are normal. She exhibits no distension and no mass. There is no tenderness. There is no rebound and no guarding.   Musculoskeletal: She exhibits tenderness. She exhibits no edema.   Mild tenderness right hip   Neurological: She is alert and oriented to person, place, and time.   Skin: Skin is warm and dry. She is not diaphoretic.   Psychiatric: She has a normal mood and affect. Her behavior is normal. Thought content normal.   Nursing note and vitals reviewed.      Fluids    Intake/Output Summary (Last 24 hours) at 06/08/19 1926  Last data filed at 06/08/19 1200   Gross per 24 hour   Intake          1849.13 ml   Output                0 ml   Net          1849.13 ml       Laboratory  Recent Labs      06/07/19 0055 06/08/19 0055   WBC  6.1  4.9   RBC  3.71*  3.74*   HEMOGLOBIN  10.4*  10.8*   HEMATOCRIT  31.6*  32.6*   MCV  85.2  87.2   MCH  28.0  28.9   MCHC  32.9*  33.1*   RDW  55.4*  57.2*   PLATELETCT  177  206   MPV  10.5  10.3     Recent Labs      06/07/19 0055 06/08/19 0055   SODIUM  130*  135   POTASSIUM   2.9*  3.5*   CHLORIDE  99  102   CO2  24  23   GLUCOSE  127*  92   BUN  14  10   CREATININE  0.39*  0.40*   CALCIUM  7.4*  7.3*                   Imaging  MR-BRAIN-WITH & W/O   Final Result      1.  Interval progression of supra and infratentorial intra-axial nodules and associated edema. This short-term interval progression favors infection over neoplasm though both remain considerations.   2.  Mild atrophy      DX-PORTABLE FLUOROSCOPY < 1 HOUR   Final Result         Portable fluoroscopy utilized for 2 minutes.      DX-PELVIS-1 OR 2 VIEWS   Final Result      Status post hardware removal from the right SI joint      DX-CHEST-PORTABLE (1 VIEW)   Final Result      Interstitial prominence could be due to pulmonary edema or hypoventilatory change.      DX-CHEST-LIMITED (1 VIEW)   Final Result      LEFT basilar underinflation atelectasis or pneumonia      CT-DRAIN-HEMATOMA - SEROMA   Final Result      CT-guided RIGHT pelvic fluid collection aspiration, laboratory evaluation pending              Assessment/Plan    Diarrhea: Ruled out for C. difficile.  Supportive care.  IV fluid.  Imodium as needed.  ABX related?    Brain lesions: Brain MRI report reviewed.  Lesion could be infectious versus metastases.  I discussed the case with Dr. Nguyen, neurosurgery, to request a consultation to evaluate the patient for possible biopsy of her brain lesions to determine etiology.    * Abscess of right iliac muscle and right gluteus medius muscle- (present on admission)   Assessment & Plan    Recurrent issue since sacral fx repair in December 2018. Has had multiple IR drainage and completed antibx.  - S/P CT guided abscess drainage on 5/30; 2 mL removed  - Culture negative and therefore no antibiotics were continued  - ID and orthopedic surgery following  - hardware removal on 6/5     Sepsis (HCC)   Assessment & Plan    This is sepsis (without associated acute organ dysfunction).  Patient with new fever and tachycardia today.  From her  retained hip hardware versus new infection.  There was concern for PICC line infection, so the PICC line will discontinue culture.  Continue cefepime and vancomycin per infectious disease.  Follow-up blood culture, urinalysis, chest x-ray     Hypomagnesemia- (present on admission)   Assessment & Plan    Improved.  Magnesium 1.6 today.     RLS (restless legs syndrome)- (present on admission)   Assessment & Plan    Restarted home dose of pramipexole.  Tolerating well     Chronic pain- (present on admission)   Assessment & Plan    Start acetaminophen 1000 mg p.o. twice daily.  Expect to help with PT OT treatments..  Continue oxycodone for breakthrough pain as needed.     History of DVT (deep vein thrombosis)- (present on admission)   Assessment & Plan    Previously on Xarelto prior to admission. Continue Lovenox 40 mg subcutaneous daily per Ortho rec.  We will switch to full dose Xarelto when cleared by Ortho..     Hypercalcemia- (present on admission)   Assessment & Plan    Chronic. Continue sensipar.      Essential hypertension- (present on admission)   Assessment & Plan    Continues to be normotensive   - Continue home losartan, metoprolol, & amlodipine.      Chronic hyponatremia- (present on admission)   Assessment & Plan    Last sodium level 130.  Continue to monitor.  1/2 normal saline.     History of breast cancer- (present on admission)   Assessment & Plan    S/p left mastectomy and breast implant  -Outpatient follow-up     COPD (chronic obstructive pulmonary disease) (HCC)- (present on admission)   Assessment & Plan    Stable. Doing well on room air. No respiratory distress/SOB. No evidence of exacerbation but will continue to monitor.         Hypokalemia: Order 40 mEq of K-Dur x1.    VTE prophylaxis: Lovenox

## 2019-06-09 NOTE — PROGRESS NOTES
Per Lab, Cryptococcal antigen blood test is performed by microbiology and not through a standard blood draw by phlebotomy.  Talked to Marcellus in microbiology, and this is not a test that is performed here at [facility]. It requires a special kit and a send out to an outside lab facility. Will tell oncoming day shift RN to pass along info to Dr. Junior as she was inquiring why this lab has not been completed.

## 2019-06-09 NOTE — PROGRESS NOTES
Infectious Disease Progress Note    Author: Geno Junior M.D. Date & Time of service: 6/9/2019  2:15 PM    Chief Complaint:  Brain abscess, iliopsoas abscess      Interval History:  68 y.o. female admitted 5/29/2019 as a transfer from [facility].  She has bben there since 4/30/2019. + diabetes, SANTOSH of right hip with hardware, and breast cancer who was originally admitted to [facility] on 03/08/2019 for right hip and pelvic pain.  Work-up revealed a right iliopsoas lesion, gluteal abscess, and mult brain abscesses  5/6 Interval 24 hours of Vitals/Labs/Micro,and imaging results reviewed as available. See assessment.   5/10 AF WBC 3 continued c/o constipation denies HA, visual changes, neuro deficits  5/11 AF WBC 8.8 isolation stopped due to resolution neutropenia-sleepy today  5/12 AF no CBC somnolent-no acute events noted  5/13 AF WBC 6.2 c/o pain left hip today, unchanged Worse with movement and improved with ice. Refused PT yesterday   5/14 AF c/o dizzyness whenever she tirns on her side-no new HA or visual changes-still havng hip pain  5/15 AF sleeping at time of rounds-by report ambulating  5/16 AF weightbearing continues to improve-ambulating more.  No new complaints  5/20- still has some pain in the hip but ambulating without any issues. No fevers. In good spirits.  5/22/2019 continues to have hip pain.  No fevers have been noted  5/24 AF no CBC plan for CHAS today. Denies any headaches. States she is ambulating  5/25 afebrile CBC not done today.  Patient CHAS was negative.  She has a poor appetite and is requesting an appetite stimulant.  Ongoing left hip pain.  Plan for CT-guided drainage tomorrow  5/28 afebrile WBC 4.5.  Patient is resting comfortably.  She states that she has right breast pain.  Ultrasound of the breast otherwise unremarkable.  She is awaiting CT-guided drainage which has been delayed as CT scan malfunctioning yesterday.  5/29 afebrile, no CBC.  Patient denies any right hip pain.   Continues to have left-sided hip pain especially with sitting. She had a repeat CT of the pelvis yesterday that revealed no changes in the abscess.  Per report, abscesses are too small for IR drainage given location and recommendation to transfer the patient to [facility] for CT-guided aspiration of the fluid for culture.  She continues to deny any fevers or chills.   AF c/o hungry and right hip pain Awaiting procedure. Denies SE abx  2019-no fevers.  Complains of pain in the hips.  Underwent aspiration of the pelvic abscess.  The cultures are negative  2019-no fevers.  Continues to complain of significant pain in her hips.  Pain medications are being adjusted.  2019-no fevers.  Continues to remain off the antibiotics.  Awaiting Ortho evaluation  2019 patient febrile up to 103.  Having shaking chills.  Is not feeling well.  Denies any specific complaints.  Labs Reviewed, Medications Reviewed, Radiology Reviewed and Wound Reviewed.   Interval 24 hours of Vitals/Labs/Micro,and imaging results reviewed as available. See assessment.    Interval 24 hours of Vitals/Labs/Micro,and imaging results reviewed as available. See assessment.  Patient denies headaches, sitting up in bed and appears to be mentating well.   Interval 24 hours of Vitals/Labs/Micro,and imaging results reviewed as available. See assessment.    Interval 24 hours of Vitals/Labs/Micro,and imaging results reviewed as available. See assessment.     Review of Systems:  Review of Systems   Constitutional: Negative for chills and fever.   Gastrointestinal: Positive for constipation. Negative for abdominal pain, diarrhea, nausea and vomiting.   Musculoskeletal: Positive for joint pain and myalgias.       Hemodynamics:  Temp (24hrs), Av.4 °C (97.6 °F), Min:35.8 °C (96.5 °F), Max:36.9 °C (98.5 °F)  Temperature: 36.9 °C (98.4 °F)  Pulse  Av.6  Min: 60  Max: 125  Blood Pressure : 133/74       Physical Exam:  Physical  Exam   Constitutional: She is oriented to person, place, and time. She appears well-developed and well-nourished.   HENT:   Head: Normocephalic and atraumatic.   Eyes: Pupils are equal, round, and reactive to light. Conjunctivae and EOM are normal.   Cardiovascular: Normal rate, regular rhythm and normal heart sounds.    Pulmonary/Chest: Effort normal and breath sounds normal.   Abdominal: Soft. Bowel sounds are normal. She exhibits no distension. There is no tenderness. There is no rebound and no guarding.   Musculoskeletal: She exhibits no edema.   Bandages on hips, no sign of infection   Neurological: She is alert and oriented to person, place, and time.   Skin: Skin is warm and dry.   Psychiatric: She has a normal mood and affect. Her behavior is normal.       Meds:    Current Facility-Administered Medications:   •  NS  •  vancomycin  •  MD Alert...Vancomycin per Pharmacy  •  cefepime  •  acetaminophen  •  LORazepam  •  enoxaparin (LOVENOX) injection  •  tramadol  •  insulin regular **AND** Accu-Chek ACHS **AND** NOTIFY MD and PharmD **AND** glucose **AND** dextrose 10% bolus  •  NS  •  pramipexole  •  lidocaine  •  oxyCODONE immediate-release  •  temazepam  •  morphine injection  •  cyclobenzaprine  •  Respiratory Care per Protocol  •  amLODIPine  •  cinacalcet  •  losartan  •  acetaminophen  •  atorvastatin  •  omeprazole  •  metoprolol  •  senna-docusate **AND** polyethylene glycol/lytes **AND** magnesium hydroxide **AND** bisacodyl  •  ondansetron  •  ondansetron    Labs:  Recent Labs      06/07/19   0055  06/08/19   0055  06/09/19   0057   WBC  6.1  4.9  5.0   RBC  3.71*  3.74*  3.81*   HEMOGLOBIN  10.4*  10.8*  10.7*   HEMATOCRIT  31.6*  32.6*  32.5*   MCV  85.2  87.2  85.3   MCH  28.0  28.9  28.1   RDW  55.4*  57.2*  55.8*   PLATELETCT  177  206  214   MPV  10.5  10.3  9.6   NEUTSPOLYS  66.30  58.40  59.20   LYMPHOCYTES  11.50*  15.30*  19.00*   MONOCYTES  11.00  11.60  12.80   EOSINOPHILS  10.50*  13.70*   7.80*   BASOPHILS  0.50  0.80  1.00     Recent Labs      06/07/19 0055 06/08/19 0055 06/09/19 0057   SODIUM  130*  135  135   POTASSIUM  2.9*  3.5*  3.6   CHLORIDE  99  102  103   CO2  24  23  25   GLUCOSE  127*  92  79   BUN  14  10  7*     Recent Labs      06/07/19 0055 06/08/19 0055 06/09/19 0057   ALBUMIN  2.9*   --    --    TBILIRUBIN  0.5   --    --    ALKPHOSPHAT  270*   --    --    TOTPROTEIN  6.0   --    --    ALTSGPT  17   --    --    ASTSGOT  32   --    --    CREATININE  0.39*  0.40*  0.38*       Imaging:  Ct-drain-hematoma - Seroma    Result Date: 5/30/2019  HISTORY/REASON FOR EXAM:  68-year-old woman with multiple fluid collections adjacent to a chronic RIGHT iliac fracture site TECHNIQUE/EXAM DESCRIPTION: CT-guided RIGHT pelvic fluid collection aspiration. Low dose optimization technique was utilized for this CT exam including automated exposure control and adjustment of the mA and/or kV according to patient size. COMPARISON: CT 4/4/2019 MEDICATIONS: Moderate sedation was achieved with administration of 2 mg versed IV and 100 micrograms fentanyl IV. Sedation was administered for a total of 30 minutes and cardiorespiratory function was monitored by trained nursing staff throughout. PROCEDURE: The risks, benefits, goals and objectives and alternatives were discussed. Risks were specified as including but not limited to bleeding, infection, damage to vessels or nerves, pain and discomfort as well as nondiagnosis. The patient's questions were answered. Informed oral and written consent were obtained. The patient was appropriately positioned on the CT gantry. Initial CT imaging was performed and a site for percutaneously accessing the target lesion was selected and marked. The skin was prepped and draped in the usual sterile manner. A timeout was performed. Local anesthetic result was achieved with administration of1% lidocaine. Using CT fluoroscopic guidance a 17-gauge guiding needle was  advanced to the target location. 2 mL of thick purulent fluid was aspirated. The needle was removed. Completion scanning was performed. The patient tolerated the procedure well with no evidence of complication.  The skin was cleaned and a dressing was applied. FINDINGS: Appropriate needle position in the most cephalad collection. No hematoma at conclusion.     CT-guided RIGHT pelvic fluid collection aspiration, laboratory evaluation pending     Ct-pelvis With    Result Date: 6/9/2019 6/9/2019 9:03 AM HISTORY/REASON FOR EXAM:  Follow-up gluteal abscess. TECHNIQUE/EXAM DESCRIPTION AND NUMBER OF VIEWS: CT scan of the pelvis with contrast. Contrast-enhanced helical scanning of the pelvis was obtained from the iliac crests through the pubic symphysis following the bolus administration of 100 mL of Omnipaque 350 nonionic contrast without complication. Low dose optimization technique was utilized for this CT exam including automated exposure control and adjustment of the mA and/or kV according to patient size. COMPARISON:  CT pelvis 5/28/2019 FINDINGS: There is abdominal aortic atherosclerotic plaque. There is colonic diverticulosis. Visualized bowel is otherwise normal in appearance There has been prior hysterectomy. There is fluid within the right iliac is muscle measuring 2.5 x 1.8 cm. This is consistent with an abscess and a slightly smaller. Orthopedic hardware has been removed. There is a subacute fracture of the right iliac crest with partial healing. There is subacute fracture of the right posterior ilium and sacrum. There is a lucency within the left ilium that is probably from prior hardware. There are lytic changes of the right iliac crest and especially the sacrum. This could be osteomyelitis although could be related to prior hardware.     1.  Residual or recurrent abscess within the right iliacus muscle measuring 2.5 x 1.8 cm. It slightly smaller than on 5/28/2019 2.  Interval removal of hardware from the  iliac bones and sacrum 3.  Lytic change of the right iliac bone and the sacrum. This could be related to hardware versus osteomyelitis. 4.  Subacute fractures of the right ilium, sacrum, and there is lucency within the left iliac bone that is likely from hardware    Ct-pelvis With    Result Date: 5/28/2019 5/28/2019 12:54 PM HISTORY/REASON FOR EXAM: Follow-up abscess TECHNIQUE/EXAM DESCRIPTION AND NUMBER OF VIEWS: CT scan of the pelvis with contrast. Contrast-enhanced helical scanning of the pelvis was obtained from the iliac crests through the pubic symphysis following the bolus administration of 100 mL of Omnipaque 350 nonionic contrast without complication. Low dose optimization technique was utilized for this CT exam including automated exposure control and adjustment of the mA and/or kV according to patient size. COMPARISON: MRI scan of the pelvis 5/20/2019 and CT chest abdomen and pelvis 4/23/2019 FINDINGS: Again redemonstrated is a 3.2 x 2 cm multiloculated low-attenuation collection in the right iliacus muscle unchanged from recent MRI scan of the pelvis. There is also a 2.8 x 1.7 cm low-attenuation collection in the right gluteus medius muscle. This collection is also unchanged from recent MRI scan of the pelvis. Previous fixation screws are noted coursing through the right lateral ilium across the sacrum extending into the left lateral ilium. There is a healing fracture of the right posterior ilium. There is no evidence of free fluid in the pelvis or pelvic mass. There are scattered air-fluid levels throughout the small bowel     1.  Stable abscesses again redemonstrated in the right iliac's muscle and right gluteus medius muscle. 2.  Status post fixation screws coursing through the jose antonio and sacrum for treatment of healing fracture in the right posterior ilium. 3.  Adynamic ileus.    Dx-chest-limited (1 View)    Result Date: 6/4/2019 6/4/2019 2:53 PM HISTORY/REASON FOR EXAM:  Fever TECHNIQUE/EXAM  DESCRIPTION AND NUMBER OF VIEWS: Single portable view of the chest. COMPARISON: 5/21/2019 FINDINGS: RIGHT upper extremity PICC is in unchanged position. LEFT axillary surgical clips are redemonstrated. HEART: Stable size. LUNGS: Lung volumes are low. There is patchy LEFT basilar opacity. PLEURA: No effusion or pneumothorax.     LEFT basilar underinflation atelectasis or pneumonia    Dx-chest-portable (1 View)    Result Date: 6/4/2019 6/4/2019 8:01 PM HISTORY/REASON FOR EXAM:  Fever. History of cough TECHNIQUE/EXAM DESCRIPTION AND NUMBER OF VIEWS: Single portable view of the chest. COMPARISON: June 4, 2019 3:20 PM FINDINGS: Right-sided PICC line remains in place. There is interstitial prominence. No effusion or pneumothorax identified. There are bilateral breast implants.     Interstitial prominence could be due to pulmonary edema or hypoventilatory change.    Dx-chest-portable (1 View)    Result Date: 5/21/2019 5/21/2019 2:30 PM HISTORY/REASON FOR EXAM:  Right-sided neck pain. TECHNIQUE/EXAM DESCRIPTION AND NUMBER OF VIEWS: Single portable view of the chest. COMPARISON: 4/30/2019 FINDINGS: Single portable view of the chest demonstrates a normal cardiac silhouette and mediastinal contours. Calcification is in the aortic knob. No discrete opacity, pleural fluid, or pneumothorax. A right PICC line remains in place. The tip appears to be located at the cavoatrial junction.     1.  No acute cardiopulmonary findings. 2.  Right PICC line place with the tip projecting over the cavoatrial junction    Dx-pelvis-1 Or 2 Views    Result Date: 6/5/2019 6/5/2019 7:30 AM HISTORY/REASON FOR EXAM:  Main OR. Hardware removal of right SI joint TECHNIQUE/EXAM DESCRIPTION AND NUMBER OF VIEWS:  1 view(s) of the pelvis. COMPARISON:  None. FINDINGS: Single fluoroscopic image obtained during hardware removal the right SI joint. Fluoroscopy time: 2 minutes     Status post hardware removal from the right SI joint    Mr-brain-with &  W/o    Result Date: 6/8/2019 6/8/2019 9:28 AM HISTORY/REASON FOR EXAM:  Brain abscesses or metastases from breast cancer. TECHNIQUE/EXAM DESCRIPTION:   MRI of the brain without and with contrast. T1 sagittal, T2 fast spin-echo axial, FLAIR axial, T1 coronal, FLAIR coronal, Diffusion weighted and Apparent Diffusion Coefficient (ADC map) axial images were obtained of the whole brain. T1 post-contrast axial and T1 post-contrast coronal images were obtained. The study was performed on a [employer] Signa 1.5 Miranda MRI scanner. 6 mL Gadavist contrast was administered intravenously. COMPARISON:  5/20/2019 FINDINGS: Innumerable supra and infratentorial intra-axial subcentimeter enhancing nodules with associated vasogenic edema are redemonstrated. The small size of these lesions makes direct measurement and comparison problematic due to technical factors. There is demonstrably increased edema in the RIGHT aaron and in association with the larger supratentorial lesions. A lesion in the RIGHT thalamus appears to be new since the prior study. None of these are associated with demonstrably true restricted diffusion. The calvariae are unremarkable.  There are no extra-axial fluid collections.  The ventricular system and basal cisterns are within normal limits. There is no herniation.  There are no hemorrhagic lesions.  The diffusion weighted axial images show no evidence of acute cerebral infarction. There is diffuse prominence of the CSF containing spaces. Vascular flow voids in the vertebrobasilar and carotid arteries, [place], and dural venous sinuses are intact. The paranasal sinuses and mastoids in the field of view are unremarkable.     1.  Interval progression of supra and infratentorial intra-axial nodules and associated edema. This short-term interval progression favors infection over neoplasm though both remain considerations. 2.  Mild atrophy    Mr-brain-with & W/o    Result Date: 5/21/2019 5/20/2019 3:37 PM  HISTORY/REASON FOR EXAM:  Follow-up brain abscesses. TECHNIQUE/EXAM DESCRIPTION:   MRI of the brain without and with contrast. T1 sagittal, T2 fast spin-echo axial, T1 coronal, FLAIR coronal, diffusion-weighted and apparent diffusion coefficient (ADC map) axial images were obtained of the whole brain. T1 postcontrast axial and T1 postcontrast coronal images were obtained. The study was performed on a "[employer]" Signa 1.5 Miranda MRI scanner. 6 mL Gadavist contrast was administered intravenously. COMPARISON:  MRI scan of the brain 4/24/2019 FINDINGS: There are innumerable small ovoid enhancing foci throughout the brain parenchyma, both in the supratentorial and infratentorial compartments. The number of these lesions has increased significantly since previous exam. The previously identified largest lesion in the right brachium pontis has resolved. There is evidence of increased T2 signal intensity in many of these lesions and there is surrounding increased T2 signal intensity in several lesions in the right superior frontal lobe. The calvariae are unremarkable. There are no extra-axial fluid collections. The ventricular system and basal cisterns are mild to moderately prominent. There is mild-to-moderate prominence of the cortical sulci and gyri. There are no mass effects or shift of midline structures. There are no hemorrhagic lesions. The diffusion-weighted axial images show no evidence of acute cerebral infarction. The brainstem and posterior fossa structures are unremarkable. Vascular flow voids in the vertebrobasilar and carotid arteries, [place], and dural venous sinuses are intact. The paranasal sinuses and mastoids in the field of view are unremarkable.     1.  Increase in number of innumerable small ovoid enhancing lesions throughout the brain parenchyma. These lesions may represent microabscesses of either bacterial or fungal origin or possibly brain metastases. 2.  Age-related cerebral  atrophy.    Mr-pelvis-with & W/o And Sequences    Result Date: 5/21/2019 5/20/2019 3:37 PM HISTORY/REASON FOR EXAM:  Abd pain, fever, abscess suspected; evaluate abscesses TECHNIQUE/EXAM DESCRIPTION: MRI of the pelvis without and with contrast. The study was performed on a [employer] Signa 1.5 Miranda MRI scanner. T1 axial, T1 coronal, T2 fast spin-echo fat-suppressed axial, and T2 fast spin-echo fat-suppressed coronal images were obtained of the pelvis. Postcontrast fat-suppressed intravenous gadolinium enhanced sequences in the axial and coronal planes were then  performed. 6 mL Gadavist contrast was administered intravenously. COMPARISON: 3/26/2019. CT 4/23/2019 FINDINGS: Interval decrease in size of the collection in the right gluteal medius muscle, measuring 1.4 x 1.9 cm, previously 2.3 x 2.8 cm. There is minimal surrounding stranding. Mild heterogeneity and stranding in the overlying right gluteus cornelio muscle without discrete collection. Grossly unchanged in size of the multiloculated collection in the right iliacus muscle, measuring 2.4 x 3.5 cm. Postsurgical change in the sacrum with susceptibility artifact from the screws. Moderate osteoarthritis of the bilateral hip joints.     1. Interval decrease in size of the collection in the right gluteal medius muscle, measuring 1.4 x 1.9 cm, previously 2.3 x 2.8 cm. There is minimal surrounding stranding. Mild heterogeneity and stranding in the overlying right gluteus cornelio muscle without discrete collection, could relate to reactive change or phlegmon. 2. Grossly unchanged in size of the multiloculated collection in the right iliacus muscle, measuring 2.4 x 3.5 cm.    Us-chest    Result Date: 5/27/2019 5/27/2019 10:23 AM HISTORY/REASON FOR EXAM:  Right lateral breast pain, evaluate implant TECHNIQUE/EXAM DESCRIPTION AND NUMBER OF VIEWS: Targeted ultrasound of the right breast. COMPARISON: None FINDINGS: No gross mass or implant rupture are identified. Diagnostic  assessment is not feasible lack of proper equipment tube performed breast imaging     1.  Nondiagnostic assessment of the right breast without gross implant rupture identified 2.  Recommend further assessment with mammography and ultrasound at an accredited breast facility    Dx-portable Fluoroscopy < 1 Hour    Result Date: 2019 7:30 AM HISTORY/REASON FOR EXAM:  Right S1 screws removal, Main OR TECHNIQUE/EXAM DESCRIPTION AND NUMBER OF VIEWS: Portable fluoroscopy for less than one hour FINDINGS:      The portable fluoroscopy unit was obligated to the procedure for less than one hour.   Actual fluoro time was 2 minutes.     Portable fluoroscopy utilized for 2 minutes.    Ec-halie W/o Cont    Result Date: 2019  Transesophageal Echo Report Echocardiography Laboratory CONCLUSIONS Normal esophageal echocardiogram. No evidence of endocarditis. No significant change since the prior study of 3/15/2019. MARLENA CLIFFORD Exam Date:          2019                     12:48 Exam Location:      Inpatient Priority:            Routine Ordering Physician:        DARON WHELAN Referring Physician: Sonographer:               CATALINO Rivers Age:    68     Gender:    F MRN:    [id number] :    [id number] BSA:           Ht (in):           Wt (lb): Report Type:      Complete Indications:     Endocarditis and heart valve disorders in diseases                  classified elsewhere ICD Codes:       I39 CPT Codes:       04285 BP:          /          HR: Technical Quality:       Fair MEASUREMENTS  (Male / Female) Normal Values 2D ECHO Estimated LV Ejection Fraction    65 %                  * Indicates values subject to auto-interpretation LV EF:  65    % Medications Medications determined by anesthesiologist. Limitations none Complications none Proc. Components The probe was inserted and manipulated by Dr Whelan. Probe #SM  was used for this procedure.  2D, color Doppler, spectral Doppler, and 3D imaging were used as part of the evaluation as clinically indicated. FINDINGS Left Ventricle The left ventricle was normal in size and function. Right Ventricle The right ventricle was normal in size and function. Right Atrium The right atrium is normal in size. Left Atrium The left atrium is normal in size. LA Appendage Normal left atrial appendage. IA Septum The interatrial septum is normal. IV Septum The interventricular septum is normal. Mitral Valve Structurally normal mitral valve without significant stenosis or regurgitation.  No valvular vegetations. Aortic Valve Structurally normal aortic valve without significant stenosis or regurgitation.  No valvular vegetations. Tricuspid Valve Structurally normal tricuspid valve without significant stenosis or regurgitation.  No valvular vegetations. Pulmonic Valve Structurally normal pulmonic valve without significant stenosis or regurgitation.  No valvular vegetations. Pericardium Normal pericardium without effusion. Aorta The aortic root is normal. Christopher Potter MD (Electronically Signed) Final Date:     24 May 2019 15:42      Micro:  Results     Procedure Component Value Units Date/Time    Cryptococcal Antigen [[id number]]     Order Status:  Sent Specimen:  Blood     Cryptococcal Antigen [[id number]]     Order Status:  Canceled Specimen:  CSF     Cryptococcal Antigen [[id number]]     Order Status:  Sent Specimen:  Blood     Cryptococcal Antigen Titer [[id number]]     Order Status:  Canceled Specimen:  Blood from Blood     Cryptococcal Antigen [[id number]]     Order Status:  Canceled Specimen:  Blood     C Diff by PCR rflx Toxin [[id number]] Collected:  06/06/19 0900    Order Status:  Completed Specimen:  Stool from Stool Updated:  06/06/19 1541     C Diff by PCR Negative     Comment: C. difficile NOT detected by PCR.  Treatment not indicated per guidelines.  Repeat testing not indicated within 7 days.           "027-NAP1-BI Presumptive Negative     Comment: Presumptive 027/NAP1/BI target DNA sequences are NOT DETECTED.       Narrative:       Special Contact [id number] MERYL PICKENS  Does this patient have risk factors for C-diff?->Yes    BLOOD CULTURE [[id number]] Collected:  06/04/19 1512    Order Status:  Completed Specimen:  Blood from Peripheral Updated:  06/05/19 0855     Significant Indicator NEG     Source BLD     Site PERIPHERAL     Culture Result No Growth  Note: Blood cultures are incubated for 5 days and  are monitored continuously.Positive blood cultures  are called to the RN and reported as soon as  they are identified.      Narrative:       Per Hospital Policy: Only change Specimen Src: to \"Line\" if  specified by physician order.  Left Forearm/Arm    BLOOD CULTURE [[id number]] Collected:  06/04/19 1512    Order Status:  Completed Specimen:  Blood from Peripheral Updated:  06/05/19 0855     Significant Indicator NEG     Source BLD     Site PERIPHERAL     Culture Result No Growth  Note: Blood cultures are incubated for 5 days and  are monitored continuously.Positive blood cultures  are called to the RN and reported as soon as  they are identified.      Narrative:       Per Hospital Policy: Only change Specimen Src: to \"Line\" if  specified by physician order.  Left Hand    Culture Respiratory W/ GRM STN [[id number]]     Order Status:  Completed Specimen:  Respirate from Sputum     URINALYSIS [[id number]]     Order Status:  No result Specimen:  Urine     BLOOD CULTURE [[id number]] Collected:  06/04/19 0000    Order Status:  Canceled Specimen:  Other from Peripheral     BLOOD CULTURE [[id number]] Collected:  06/04/19 0000    Order Status:  Canceled Specimen:  Other from Peripheral     FLUID CULTURE W/GRAM STAIN [[id number]] Collected:  05/30/19 1339    Order Status:  Completed Specimen:  Body Fluid Updated:  06/03/19 0726     Significant Indicator NEG     Source BF     Site pelvic abcess     Culture Result No " "growth at 4 days.     Gram Stain Result Few WBCs.  No organisms seen.            Assessment:  Active Hospital Problems    Diagnosis   • *Abscess of right iliac muscle and right gluteus medius muscle [L02.91]   • Sepsis (HCC) [A41.9]   • Hypomagnesemia [E83.42]   • Chronic pain [G89.29]   • RLS (restless legs syndrome) [G25.81]   • Hypercalcemia [E83.52]   • Essential hypertension [I10]   • Chronic hyponatremia [E87.1]   • History of breast cancer [Z85.3]   • COPD (chronic obstructive pulmonary disease) (HCC) [J44.9]   • History of DVT (deep vein thrombosis) [Z86.718]     Assessment:  Gluteal and iliopsoas abscess  Brain abscesses vs mets  Breast cancer  DM     Interval 24 hour assessment:   AF, O2 RA,    Labs reviewed  Imaging personally reviewed both images and report.   Micro reviewed    Pt continued on cefepime and vancomycin.  She continues to have pain in her hips most prominent on the left.  She is constipated today and reporting no bowel movement in the past 3 days.  Stool softeners given.        Plan:     New fevers. 101 on 6/4 -now improved  Uncertain etiology, may be related to known abscesses (see below) or central fever secondary to potential brain mets/abscesses  Panculture again-blood cultures on 6/4 with no growth to date  Went to the OR as below on 6/6      Gluteal and iliopsoas abscess s/p treatment.    Adjacent to hardware in right hip (placed 12/17/18)  CT 4/23 \"Fluid collections within the right gluteal and iliacis muscles.. The collection within the right gluteal muscle has unchanged while the fluid collection within the right iliacis muscle is increased somewhat in size.\" 2.7 cm  Cultures 3/29 and 4/4 neg  MRI on 5/20/2019 - interval decrease in collection in R gluteal muscle and persistent multiloculated collection in R iliacus muscle.   CT 5/28 no change  Underwent drainage on 5/30/2019-cultures are negative  ESR is 32 and CRP has come down to 0.98 from 3.53  OR on 6/6 with removal of " "hardware from R hip- no cultures were sent   CT on 6/8 with residual abscess in the right iliac is muscle, 2.5 x 1.8, slightly smaller than prior      --- Blood cultures remain negative and no OR cultures were sent per microbiology   --- She has had an extended course of antibiotics with vancomycin and cefepime, see below  --- Will continue cefepime and vancomycin      Brain abscesses vs mets  - MRI 4/23 \"supra and infratentorial brain parenchyma... interval decrease in the size of the lesions. There are also interval reduction in the extent of the surrounding white matter edema in the restricted diffusion consistent with improvement/treatment response of the most of the multifocal brain abscesses.  - Repeat MRI 5/21 showed innumerable microabscesses vs mets -this change occurred while she was still on broad-spectrum antibiotics indicating this is either not an infectious process or an infectious etiology that is not well covered by either vancomycin, cefepime or Flagyl  CHAS neg 3/15 and 5/24  No benefit of PET scan per notes-  Abx (vancomycin, cefepime and Flagyl) discontinued on 5/23 after ~8 weeks of treatment-developed new fevers on 6/4, uncertain etiology  HIV negative  Aspergillosis galactomannan negative     --- Concern her brain abscess may be metastasis versus unusual infectious etiology  --- Initiated work-up for non-common bacterial causes-cryptococcus antigen, cocci, beta D glucan and galactomannan sent  -- A few reports of microabscesses secondary to Candida were found in literature but differential remains broad   --- Repeat MRI on 6/7 with interval progression of supra and infratentorial intra--axial nodules and associated edema.  Appears to be new right thalamus lesion. Radiology favors infection over neoplasm though both remain considerations  --- Requested neurosurgical evaluation -appreciate assistance  -neurosurgery is recommending continuing antibiotics for another 2 weeks and then repeating MRI " brain, per neurosurgery the lesions are not amenable to biopsy -we can continue antibiotics and as noted above and still with fluid collection in hip and hardware recently removed --- Additional work-up has been initiated to potentially target an infectious source of the brain abscess-  would not continue these antiviral beyond 2 weeks if the process continues as would clearly not have responded to the the current regimen  --- Adding fluconazole 800 mg daily to regimen (dosed for CNS penetration) if other etiology is discovered or beta D glucan is negative would stop    History of pelvic fracture  Status post pin placement and sacral  Eventually will need removal     Type 2 DM  Hemoglobin A1c 6.3   Keep BS under 150 to help control current infection     Diarrhea  -C. difficile negative on 6/6     Plan to discharge to skilled facility.     Discussed IM.  ID will follow.         Geno Junior MD  Infectious Diseases

## 2019-06-09 NOTE — PROGRESS NOTES
Bedside report received from previous nurse regarding prior 12 hours.  Pt resting comfortably.  No signs of pain or distress.  Bed locked in lowest position.  White board updated.  Call light within reach.

## 2019-06-09 NOTE — CARE PLAN
Problem: Safety  Goal: Will remain free from injury  Outcome: PROGRESSING AS EXPECTED  Pt remains free from injury. Bed in lowest position, locked, and alarm activated. Nonskid footwear in place. Call light and personal belongings within reach. Hourly rounding.    Problem: Venous Thromboembolism (VTW)/Deep Vein Thrombosis (DVT) Prevention:  Goal: Patient will participate in Venous Thrombosis (VTE)/Deep Vein Thrombosis (DVT)Prevention Measures  Outcome: PROGRESSING AS EXPECTED   06/08/19 2000 06/08/19 2140   OTHER   Risk Assessment Score 3 --    VTE RISK High --    Pharmacologic Prophylaxis Used LMWH: Enoxaparin(Lovenox) --    Mechanical/VTE Prophylaxis   Mechanical Prophylaxis  --  SCDs, Sequential Compression Device   SCDs, Sequential Compression Device --  On   Pt is participating in VTE/DVT prevention measures

## 2019-06-09 NOTE — PROGRESS NOTES
2 RN skin check completed with Mechelle   .   Devices in place none.  Skin assessed under devices n/a.  Confirmed pressure ulcers found on nine.  New potential pressure ulcers noted on non. Wound consult placed j/a.  The following interventions in place : waffle mattress, pt turns self from side to side frequently by self. Encouraged to float heels on pillow as tolerated, barrier cream to bottom for incontinence.    Incision on R hip, gauze dressing and degaderm CDI, Sacrum pink, blanching. Unable to apply mepilex due to intermittent incontinence of urine. Waffle mattress, and encouraged to float heels on pillows.

## 2019-06-09 NOTE — CONSULTS
DATE OF SERVICE:  06/08/2019    NEUROSURGICAL CONSULTATION    HISTORY OF PRESENT ILLNESS:  The patient is a very pleasant 68-year-old woman   with a known history of right-sided sacral fracture involving S1 and S2, and   had a transsacral screw fixation, gluteal abscess, right iliopsoas lesion who   had a prior MRI several weeks ago that was consistent with multifocal punctate   abscesses.  A repeat MRI was obtained.  Despite the lesions being very small,   there was a concern for enlargement and new lesions on repeat MRI, so   neurosurgical consultation was requested.  Apparently, previous MRI did not   generate a neurosurgical consultation based on the very small nature of the   lesions.  She also has a history of breast cancer and there was some concern   over whether these represent tumor versus infection.    PAST MEDICAL HISTORY:  COPD, diabetes, GERD, hyperlipidemia, and hypertension.    PAST SURGICAL HISTORY:  CHAS, sacroiliac screw by Dr. Porter in 12/2018,   mastectomy as well.  She had a CT-guided aspiration of her right pelvis on   05/30/2019, the day after she was admitted.  She is being seen by medicine and   by infectious disease, radiology and orthopedics.    PHYSICAL EXAMINATION:  GENERAL:  Awake, alert and oriented x3, in no acute distress.  HEENT:  Pupils equal, round, reactive to light.  Extraocular muscles are   intact.  Tongue midline.  Face is symmetric.  NEUROLOGIC:  Motor is 5/5 strength in all muscle groups in the upper and lower   extremities.    LABORATORY VALUES:  CBC within normal limits.  ESR from 06/01/2019 is 32.    Basic metabolic panel is within normal limits.  INR is 0.97 on 05/28/2019.    PTT has not been done since March.  Urinalysis has not been done on this   admission.  CRP on 06/01/2019 is 0.98, which is elevated.    IMAGING:  MRI of the brain with and without contrast on 06/08/2019 compared to   05/20/2019, MRI demonstrates innumerable punctate lesions throughout the    brain and brainstem bilaterally, which is similar in appearance to previous   MRI on 05/20/2019 with the exception of there are several new lesions that are   present now that were not present then.  None of these lesions demonstrate a   ring enhancing appearance nor do they have any restricted diffusion given the   fact that they are also small.  This is consistent with diffuse metastatic   disease versus micro abscesses, I would favor micro abscesses given the   patient's history.  There is no shift.    PLAN:  None of these lesions is amenable to biopsy through stereotactic means.    At this point, I would favor watching her and repeating another MRI in 2   weeks and continuing antibiotics under the presumption that this represents an   infectious etiology.  I do not believe the patient needs steroids and I will   put her on seizure prophylaxis given the nature and the number of these   lesions as they could be generating seizures potentially.  We will follow from   a distance.  Repeat MRI in 2 weeks.       ____________________________________     DARON WHITMAN MD    CPD / NTS    DD:  06/08/2019 21:37:14  DT:  06/08/2019 22:57:47    D#:  [id number]  Job#:  [id number]

## 2019-06-09 NOTE — PROGRESS NOTES
"Pharmacy Kinetics 68 y.o. female on vancomycin day # 6 6/9/2019     Currently on Vancomycin 800 mg iv q24hr (1800)     Indication for Treatment: Brain abscess, iliopsoas abscess     Pertinent history per medical record: Admitted on 5/29/2019 for pelvic abscess. Patient was recently admitted to [facility] from 3/8-4/30 for fever. She was found to have possible brain abscess as well as iliopsoas abscess. ID was consulted during that admission and she was treated with meropenem and vancomycin, with plans for 6 weeks of antibiotics. She eventually transferred to [facility] for completion of antibiotics. CT scan on 5/28 again demonstrated pelvic abscess, prompting return to [facility] for I&D, which was performed with IR on 5/30. She had removal of possibly infected hardware on 6/5. MRI showed increase in the number of innumerable small ovoid enhancing lesions throughout the brain parenchyma (possible brain abscess vs metastatic lesions). ID is following.      Other antibiotics: cefepime 2 g IV q8h     Allergies: Patient has no known allergies.      List concerns for renal function: age     Pertinent cultures to date:   06/04/19: C.Diff: Negative  06/04/19: PBCx2: NGTD  05/30/19: Pelvic abscess,BF: NGTD     MRSA nares swab if pneumonia is a concern (ordered/positive/negative/n-a): NA    Recent Labs      06/07/19   0055  06/08/19   0055  06/09/19   0057   WBC  6.1  4.9  5.0   NEUTSPOLYS  66.30  58.40  59.20     Recent Labs      06/07/19   0055  06/08/19   0055  06/09/19   0057   BUN  14  10  7*   CREATININE  0.39*  0.40*  0.38*   ALBUMIN  2.9*   --    --      Recent Labs      06/06/19   2147  06/08/19   1655   VANCOTROUGH  14.4  11.3     Intake/Output Summary (Last 24 hours) at 06/09/19 0800  Last data filed at 06/09/19 0600   Gross per 24 hour   Intake              950 ml   Output                0 ml   Net              950 ml      /81   Pulse 88   Temp 36 °C (96.8 °F) (Temporal)   Resp 16   Ht 1.575 m (5' 2\")   Wt 56.5 kg (124 " lb 9 oz)   SpO2 96%  Temp (24hrs), Av.4 °C (97.5 °F), Min:35.8 °C (96.5 °F), Max:36.9 °C (98.5 °F)      A/P        1. Vancomycin dose change: Vancomycin 700mg iv q12hr (1200 0000)  2. Next vancomycin level: 06/10/19@1130  3. Goal trough: 16-20 mcg/mL   4. Comments: No leukocytosis , afebrile, cx NGTD. Renal indices stable over interval. Last trough level below goal , increased maintenance dose to q12hr. ID following , continue current plan.      Willy CandelariaD BCPS

## 2019-06-09 NOTE — PROGRESS NOTES
Assumed pt care. Pt resting in bed. Plan of care discussed with pt. Verbalizes understanding. Bed in lowest position, locked, and alarm activated. Call light within reach. ST on monitor.

## 2019-06-10 LAB
ANION GAP SERPL CALC-SCNC: 7 MMOL/L (ref 0–11.9)
APPEARANCE UR: CLEAR
BACTERIA #/AREA URNS HPF: NEGATIVE /HPF
BASOPHILS # BLD AUTO: 1.1 % (ref 0–1.8)
BASOPHILS # BLD: 0.06 K/UL (ref 0–0.12)
BILIRUB UR QL STRIP.AUTO: NEGATIVE
BUN SERPL-MCNC: 6 MG/DL (ref 8–22)
CALCIUM SERPL-MCNC: 7.5 MG/DL (ref 8.5–10.5)
CHLORIDE SERPL-SCNC: 103 MMOL/L (ref 96–112)
CO2 SERPL-SCNC: 24 MMOL/L (ref 20–33)
COLOR UR: YELLOW
CREAT SERPL-MCNC: 0.34 MG/DL (ref 0.5–1.4)
CREAT UR-MCNC: 14.4 MG/DL
EOSINOPHIL # BLD AUTO: 0.39 K/UL (ref 0–0.51)
EOSINOPHIL NFR BLD: 7.3 % (ref 0–6.9)
EPI CELLS #/AREA URNS HPF: NEGATIVE /HPF
ERYTHROCYTE [DISTWIDTH] IN BLOOD BY AUTOMATED COUNT: 56.9 FL (ref 35.9–50)
GLUCOSE BLD-MCNC: 100 MG/DL (ref 65–99)
GLUCOSE BLD-MCNC: 89 MG/DL (ref 65–99)
GLUCOSE BLD-MCNC: 95 MG/DL (ref 65–99)
GLUCOSE BLD-MCNC: 98 MG/DL (ref 65–99)
GLUCOSE SERPL-MCNC: 111 MG/DL (ref 65–99)
GLUCOSE UR STRIP.AUTO-MCNC: NEGATIVE MG/DL
HCT VFR BLD AUTO: 32.4 % (ref 37–47)
HGB BLD-MCNC: 10.6 G/DL (ref 12–16)
HYALINE CASTS #/AREA URNS LPF: ABNORMAL /LPF
IMM GRANULOCYTES # BLD AUTO: 0.03 K/UL (ref 0–0.11)
IMM GRANULOCYTES NFR BLD AUTO: 0.6 % (ref 0–0.9)
KETONES UR STRIP.AUTO-MCNC: NEGATIVE MG/DL
LEUKOCYTE ESTERASE UR QL STRIP.AUTO: ABNORMAL
LYMPHOCYTES # BLD AUTO: 0.94 K/UL (ref 1–4.8)
LYMPHOCYTES NFR BLD: 17.7 % (ref 22–41)
MCH RBC QN AUTO: 28.6 PG (ref 27–33)
MCHC RBC AUTO-ENTMCNC: 32.7 G/DL (ref 33.6–35)
MCV RBC AUTO: 87.3 FL (ref 81.4–97.8)
MICRO URNS: ABNORMAL
MONOCYTES # BLD AUTO: 0.72 K/UL (ref 0–0.85)
MONOCYTES NFR BLD AUTO: 13.5 % (ref 0–13.4)
NEUTROPHILS # BLD AUTO: 3.18 K/UL (ref 2–7.15)
NEUTROPHILS NFR BLD: 59.8 % (ref 44–72)
NITRITE UR QL STRIP.AUTO: NEGATIVE
NRBC # BLD AUTO: 0 K/UL
NRBC BLD-RTO: 0 /100 WBC
PH UR STRIP.AUTO: 7 [PH]
PLATELET # BLD AUTO: 218 K/UL (ref 164–446)
PMV BLD AUTO: 9.7 FL (ref 9–12.9)
POTASSIUM SERPL-SCNC: 3.1 MMOL/L (ref 3.6–5.5)
POTASSIUM UR-SCNC: 17.4 MMOL/L
PROT UR QL STRIP: NEGATIVE MG/DL
PROT UR-MCNC: 27.3 MG/DL (ref 0–15)
RBC # BLD AUTO: 3.71 M/UL (ref 4.2–5.4)
RBC # URNS HPF: ABNORMAL /HPF
RBC UR QL AUTO: NEGATIVE
SODIUM SERPL-SCNC: 134 MMOL/L (ref 135–145)
SODIUM UR-SCNC: 94 MMOL/L
SP GR UR STRIP.AUTO: 1.01
UROBILINOGEN UR STRIP.AUTO-MCNC: 0.2 MG/DL
VANCOMYCIN TROUGH SERPL-MCNC: 21.6 UG/ML (ref 10–20)
WBC # BLD AUTO: 5.3 K/UL (ref 4.8–10.8)
WBC #/AREA URNS HPF: ABNORMAL /HPF

## 2019-06-10 PROCEDURE — 84133 ASSAY OF URINE POTASSIUM: CPT

## 2019-06-10 PROCEDURE — 700102 HCHG RX REV CODE 250 W/ 637 OVERRIDE(OP): Performed by: HOSPITALIST

## 2019-06-10 PROCEDURE — A9270 NON-COVERED ITEM OR SERVICE: HCPCS | Performed by: HOSPITALIST

## 2019-06-10 PROCEDURE — A9270 NON-COVERED ITEM OR SERVICE: HCPCS | Performed by: NURSE PRACTITIONER

## 2019-06-10 PROCEDURE — 700111 HCHG RX REV CODE 636 W/ 250 OVERRIDE (IP): Performed by: INTERNAL MEDICINE

## 2019-06-10 PROCEDURE — 82962 GLUCOSE BLOOD TEST: CPT

## 2019-06-10 PROCEDURE — 700105 HCHG RX REV CODE 258: Performed by: INTERNAL MEDICINE

## 2019-06-10 PROCEDURE — 81001 URINALYSIS AUTO W/SCOPE: CPT

## 2019-06-10 PROCEDURE — A9270 NON-COVERED ITEM OR SERVICE: HCPCS | Performed by: INTERNAL MEDICINE

## 2019-06-10 PROCEDURE — 770001 HCHG ROOM/CARE - MED/SURG/GYN PRIV*

## 2019-06-10 PROCEDURE — 36415 COLL VENOUS BLD VENIPUNCTURE: CPT

## 2019-06-10 PROCEDURE — 84300 ASSAY OF URINE SODIUM: CPT

## 2019-06-10 PROCEDURE — 80048 BASIC METABOLIC PNL TOTAL CA: CPT

## 2019-06-10 PROCEDURE — 700102 HCHG RX REV CODE 250 W/ 637 OVERRIDE(OP): Performed by: INTERNAL MEDICINE

## 2019-06-10 PROCEDURE — 84156 ASSAY OF PROTEIN URINE: CPT

## 2019-06-10 PROCEDURE — 82570 ASSAY OF URINE CREATININE: CPT

## 2019-06-10 PROCEDURE — 85025 COMPLETE CBC W/AUTO DIFF WBC: CPT

## 2019-06-10 PROCEDURE — 99233 SBSQ HOSP IP/OBS HIGH 50: CPT | Performed by: INTERNAL MEDICINE

## 2019-06-10 PROCEDURE — 700101 HCHG RX REV CODE 250: Performed by: NURSE PRACTITIONER

## 2019-06-10 PROCEDURE — 700102 HCHG RX REV CODE 250 W/ 637 OVERRIDE(OP): Performed by: NURSE PRACTITIONER

## 2019-06-10 PROCEDURE — 80202 ASSAY OF VANCOMYCIN: CPT

## 2019-06-10 RX ORDER — POTASSIUM CHLORIDE 20 MEQ/1
40 TABLET, EXTENDED RELEASE ORAL 2 TIMES DAILY
Status: COMPLETED | OUTPATIENT
Start: 2019-06-10 | End: 2019-06-10

## 2019-06-10 RX ORDER — POTASSIUM CHLORIDE 20 MEQ/1
40 TABLET, EXTENDED RELEASE ORAL DAILY
Status: DISCONTINUED | OUTPATIENT
Start: 2019-06-10 | End: 2019-06-11

## 2019-06-10 RX ADMIN — CINACALCET HYDROCHLORIDE 60 MG: 30 TABLET, COATED ORAL at 05:34

## 2019-06-10 RX ADMIN — MAGNESIUM OXIDE TAB 400 MG (241.3 MG ELEMENTAL MG) 400 MG: 400 (241.3 MG) TAB at 09:03

## 2019-06-10 RX ADMIN — RIVAROXABAN 15 MG: 15 TABLET, FILM COATED ORAL at 17:57

## 2019-06-10 RX ADMIN — OXYCODONE HYDROCHLORIDE 10 MG: 10 TABLET ORAL at 02:14

## 2019-06-10 RX ADMIN — VANCOMYCIN HYDROCHLORIDE 700 MG: 100 INJECTION, POWDER, LYOPHILIZED, FOR SOLUTION INTRAVENOUS at 11:46

## 2019-06-10 RX ADMIN — VANCOMYCIN HYDROCHLORIDE 700 MG: 100 INJECTION, POWDER, LYOPHILIZED, FOR SOLUTION INTRAVENOUS at 00:29

## 2019-06-10 RX ADMIN — OXYCODONE HYDROCHLORIDE 10 MG: 10 TABLET ORAL at 09:03

## 2019-06-10 RX ADMIN — POTASSIUM CHLORIDE 40 MEQ: 1500 TABLET, EXTENDED RELEASE ORAL at 09:04

## 2019-06-10 RX ADMIN — ATORVASTATIN CALCIUM 10 MG: 10 TABLET, FILM COATED ORAL at 17:57

## 2019-06-10 RX ADMIN — POTASSIUM CHLORIDE 40 MEQ: 1500 TABLET, EXTENDED RELEASE ORAL at 18:04

## 2019-06-10 RX ADMIN — ACETAMINOPHEN 1000 MG: 500 TABLET ORAL at 05:29

## 2019-06-10 RX ADMIN — SENNOSIDES AND DOCUSATE SODIUM 2 TABLET: 8.6; 5 TABLET ORAL at 05:30

## 2019-06-10 RX ADMIN — METOPROLOL TARTRATE 25 MG: 25 TABLET, FILM COATED ORAL at 05:33

## 2019-06-10 RX ADMIN — ACETAMINOPHEN 1000 MG: 500 TABLET ORAL at 17:56

## 2019-06-10 RX ADMIN — PRAMIPEXOLE DIHYDROCHLORIDE 1 MG: 0.5 TABLET ORAL at 11:46

## 2019-06-10 RX ADMIN — LIDOCAINE 2 PATCH: 50 PATCH CUTANEOUS at 14:15

## 2019-06-10 RX ADMIN — OXYCODONE HYDROCHLORIDE 10 MG: 10 TABLET ORAL at 13:04

## 2019-06-10 RX ADMIN — AMLODIPINE BESYLATE 10 MG: 5 TABLET ORAL at 05:33

## 2019-06-10 RX ADMIN — POTASSIUM CHLORIDE 40 MEQ: 1500 TABLET, EXTENDED RELEASE ORAL at 05:46

## 2019-06-10 RX ADMIN — PRAMIPEXOLE DIHYDROCHLORIDE 1 MG: 0.5 TABLET ORAL at 05:31

## 2019-06-10 RX ADMIN — CEFEPIME 2 G: 2 INJECTION, POWDER, FOR SOLUTION INTRAVENOUS at 18:00

## 2019-06-10 RX ADMIN — FLUCONAZOLE 800 MG: 200 TABLET ORAL at 05:27

## 2019-06-10 RX ADMIN — TEMAZEPAM 15 MG: 15 CAPSULE ORAL at 20:49

## 2019-06-10 RX ADMIN — METOPROLOL TARTRATE 25 MG: 25 TABLET, FILM COATED ORAL at 17:57

## 2019-06-10 RX ADMIN — CEFEPIME 2 G: 2 INJECTION, POWDER, FOR SOLUTION INTRAVENOUS at 05:35

## 2019-06-10 RX ADMIN — ENOXAPARIN SODIUM 40 MG: 100 INJECTION SUBCUTANEOUS at 05:35

## 2019-06-10 RX ADMIN — LOSARTAN POTASSIUM 50 MG: 50 TABLET ORAL at 05:33

## 2019-06-10 RX ADMIN — OXYCODONE HYDROCHLORIDE 10 MG: 10 TABLET ORAL at 17:57

## 2019-06-10 RX ADMIN — PRAMIPEXOLE DIHYDROCHLORIDE 1 MG: 0.5 TABLET ORAL at 17:57

## 2019-06-10 RX ADMIN — OMEPRAZOLE 20 MG: 20 CAPSULE, DELAYED RELEASE ORAL at 05:34

## 2019-06-10 ASSESSMENT — ENCOUNTER SYMPTOMS
ABDOMINAL PAIN: 0
FEVER: 0
SHORTNESS OF BREATH: 0
DIZZINESS: 0
VOMITING: 0
MYALGIAS: 0
HEADACHES: 0
CHILLS: 0
BLURRED VISION: 0
BLOOD IN STOOL: 0
DIARRHEA: 0
NAUSEA: 0
CONSTIPATION: 0

## 2019-06-10 NOTE — PROGRESS NOTES
Assumed pt care. Pt resting in bed. Plan of care discussed with pt. Verbalizes understanding. Bed in lowest position, locked, and alarm activated. Call light within reach. No tele monitor - pt is medical

## 2019-06-10 NOTE — CARE PLAN
Problem: Safety  Goal: Will remain free from injury  Outcome: PROGRESSING AS EXPECTED  Pt remains free from injury. Bed in lowest position, locked, and alarm activated. Nonskid footwear in place. Call light and personal belongings within reach. Hourly rounding.    Problem: Venous Thromboembolism (VTW)/Deep Vein Thrombosis (DVT) Prevention:  Goal: Patient will participate in Venous Thrombosis (VTE)/Deep Vein Thrombosis (DVT)Prevention Measures  Outcome: PROGRESSING AS EXPECTED  Pt is participating in VTE/DVT prevention measures

## 2019-06-10 NOTE — PROGRESS NOTES
2 RN skin check completed with Loretta                               .   Devices in place SCD's.  Skin assessed under devices SCD's.  Confirmed pressure ulcers found on none.  New potential pressure ulcers noted on non. Wound consult placed n/a.  The following interventions in place : waffle mattress, pt turns self from side to side frequently by self. Encouraged to float heels on pillow as tolerated, mepilex placed on bilat heels, barrier cream to bottom for incontinence.     Incision on R hip, gauze dressing and degaderm CDI, Sacrum pink, blanching, bilat heels red, blanching. Unable to apply mepilex due to intermittent incontinence of urine. Waffle mattress, encouraged to float heels on pillows, refusing float boots, so 1/2 sacrum mepilex applied to bilat heels

## 2019-06-10 NOTE — DIETARY
Nutrition Services: Update   Day 12 of admit.  Muriel Martini is a 68 y.o. female with admitting DX of iliac abscess, sepsis      RD visited pt at bedside to follow-up for adequate PO intake.     Pt is currently on Regular diet with Boost GC TID. Pt endorsed pain at RD visit, stated RN was already made aware of it. Pt states is eating the same, has been drinking and enjoying the boost. Denies need for meal or supplement modifications at this time. Per ADLs charted since 6/6, for last 9x meals charted, pt consuming on average 70%. Pt meeting estimated needs with amount consumed from trays and supplements.     Wt: 55.4 kg- via Bed Scale- wt has been fluctuating since admission, though currently similar to weight upon admission.     Malnutrition Risk: (per RD note on 5/30): Pt with moderate malnutriton in context of chronic illness related to multiple abscesses as evidenced by potential 15% weight loss in 1 month and moderate muscle wasting at temporal region and mild fat loss at orbital fat pads. Malnutrition update: pt has been followed to ensure adequate PO for replenishment. Receiving Boost Glucose control TID.     Recommendations/Plan:  1. Encourage intake of meals/ supplements   2. Document intake of all meals/ supplements as % taken in ADL's to provide interdisciplinary communication across all shifts.   3. Monitor weight.  4. Nutrition rep will continue to see patient for ongoing meal and snack preferences.    RD available as needed; follow-up per screen protocol.

## 2019-06-10 NOTE — DISCHARGE PLANNING
Care Transition Team Assessment    LSW met with pt at bedside to complete assessment. Pt reported being in pain and unable to answer assessment questions at this time. Pt requested LSW contact her brother/Healthcare POA regarding assessment questions. Pt's floor RN notified of pt's reported pain.  Pt's brother/POA, René Hsu provided the following information for assessment. He indicated that pt is currently  from her --currently going through divorce which is believed to be finalize in Sept 2019. He reported due to this pt is technically homeless; however, explained the family's plan is to have pt go to a SNF in [place] to get stronger and than either transferring to a SNF in [place] (where the family could drive pt) or home to their other brother's home: Prasanna Hsu once pt is strong enough. He reported once pt is in [place] the family hopes to apply for Medi[employer].     Information Source  Orientation : Oriented x 4  Information Given By: Other (Comments) (Brother)  Informant's Name: Arsh    Readmission Evaluation  Is this a readmission?: Yes - unplanned readmission  Why do you think you were readmitted?: Medical complexity-transfers    Elopement Risk  Legal Hold: No  Ambulatory or Self Mobile in Wheelchair: No-Not an Elopement Risk  Disoriented: No  Psychiatric Symptoms: None  History of Wandering: No  Elopement this Admit: No  Vocalizing Wanting to Leave: No  Displays Behaviors, Body Language Wanting to Leave: No-Not at Risk for Elopement  Elopement Risk: Not at Risk for Elopement    Interdisciplinary Discharge Planning  Primary Care Physician:  (has been at [facility]) Brother reported he believes pt has been seeing a PCP in [facility] but is unsure of the provider name or information.   Lives with - Patient's Self Care Capacity:  (getting a divorce and has been in hospital settings past 5 months)  Patient or legal guardian wants to designate a caregiver (see row info): No  Support Systems:  Friends / Neighbors  Name of Care Facility: [facility]  Do You Take your Prescribed Medications Regularly: Yes  Able to Return to Previous ADL's: Future Time w/Therapy  Mobility Issues: Yes  Prior Services: Other (Comments) (has been at LT[facility])  Assistance Needed: Yes  Durable Medical Equipment: Walker, Commode    Discharge Preparedness  What is your plan after discharge?: Skilled nursing facility  What are your discharge supports?: Sibling; plans to move in with brother Prasanna in [place], [place] once able.   Prior Functional Level: Independent with Activities of Daily Living, Uses Walker, Other (Comments) (Used Electric scooter)  Difficulity with ADLs: BrothArsh west reported prior to pt's hospital admissions starting 5 months ago, she was independent in her ADL needs.   Difficulity with IADLs: BrothArsh west reported prior to pt's hospital admissions, 5 months ago, pt was independent in her iADL needs. He did report pt does not drive.   Functional Assesment  Prior Functional Level: Independent with Activities of Daily Living, Uses Walker, Other (Comments) (Used Electric scooter)    Finances  Financial Barriers to Discharge: No. Brother reported pt receives SSA in the amount of $1023/month. He reported he opened a new account for her upon the separation of her and her . He indicated he is currently on the account and managing pt's finances at this time. He denied any assets such as money in the bank, property, life insurance, or burial policies.   Prescription Coverage: Yes    Vision / Hearing Impairment  Right Eye Vision: Impaired, Wears Glasses  Left Eye Vision: Impaired, Wears Glasses    Advance Directive  Advance Directive?: DPOA for Health Care verified in media tab of pt's chart.   Durable Power of  Name and Contact :  (Arsh Hsu [phone]; Prasanna Hsu [phone]; Jennifer Mechan [phone])    Psychological Assessment  History of Substance Abuse: None  History of Psychiatric Problems:  No    Discharge Risks or Barriers  Discharge risks or barriers?: Complex medical needs  Patient risk factors: Complex medical needs    Anticipated Discharge Information  Anticipated discharge disposition: SNF

## 2019-06-10 NOTE — PROGRESS NOTES
"   Orthopaedic Progress Note    Interval changes:  Patient doing well   No interval change  Dressing CDI  Cleared for DC to SNF by ortho pending medicine and ID team clearance     ROS - Patient denies any new issues.  Pain well controlled.    /96   Pulse (!) 106   Temp 37.1 °C (98.8 °F) (Temporal)   Resp 18   Ht 1.575 m (5' 2\")   Wt 56.5 kg (124 lb 9 oz)   SpO2 95%       Patient seen and examined  No acute distress  Breathing non labored  RRR  Right hip surgical dressing is clean, dry, and intact. Patient clearly fires tibialis anterior, EHL, and gastrocnemius/soleus. Sensation is intact to light touch throughout superficial peroneal, deep peroneal, tibial, saphenous, and sural nerve distributions. Strong and palpable 2+ dorsalis pedis and posterior tibial pulses with capillary refill less than 2 seconds. No lower leg tenderness or discomfort.        Recent Labs      06/07/19   0055  06/08/19   0055  06/09/19   0057   WBC  6.1  4.9  5.0   RBC  3.71*  3.74*  3.81*   HEMOGLOBIN  10.4*  10.8*  10.7*   HEMATOCRIT  31.6*  32.6*  32.5*   MCV  85.2  87.2  85.3   MCH  28.0  28.9  28.1   MCHC  32.9*  33.1*  32.9*   RDW  55.4*  57.2*  55.8*   PLATELETCT  177  206  214   MPV  10.5  10.3  9.6       Active Hospital Problems    Diagnosis   • Sepsis (HCC) [A41.9]     Priority: High   • Abscess of right iliac muscle and right gluteus medius muscle [L02.91]     Priority: High   • Hypomagnesemia [E83.42]     Priority: Medium   • Chronic pain [G89.29]     Priority: Medium   • RLS (restless legs syndrome) [G25.81]     Priority: Medium   • Hypercalcemia [E83.52]     Priority: Low   • Essential hypertension [I10]     Priority: Low   • Chronic hyponatremia [E87.1]     Priority: Low   • History of breast cancer [Z85.3]     Priority: Low   • COPD (chronic obstructive pulmonary disease) (HCC) [J44.9]     Priority: Low   • History of DVT (deep vein thrombosis) [Z86.718]       Assessment/Plan:  Cleared for DC by ortho pending " medicine and ID team clearance  POD#4  S/P Hardware removal, pelvis  Wt bearing status - WBAT  Wound care/Drains - dressing changes every other day by nursing  Future Procedures - none planned   Lovenox: Start 6/6, Duration-until ambulatory > 150'  Sutures/Staples out- 10-14 days post operatively  PT/OT-initiated  Antibiotics: maxipime 2g IV BID, vanco 700mg IV QD  DVT Prophylaxis- TEDS/SCDs/Foot pumps  Seu-none  Case Coordination for Discharge Planning - Disposition pending abx needs

## 2019-06-10 NOTE — PROGRESS NOTES
Infectious Disease Progress Note    Author: Karen Garcia M.D. Date & Time of service: 6/10/2019  9:40 AM    Chief Complaint:  Brain abscess, iliopsoas abscess, leukopenia      Interval History:  68 y.o. female admitted 5/29/2019 as a transfer from [facility] since 4/30/2019. + diabetes, SANTOSH of right hip with hardware, and breast cancer who was originally admitted to [facility] on 03/08/2019 for right hip and pelvic pain.  Work-up revealed a right iliopsoas lesion, gluteal abscess, and mult brain abscesses  5/6 Interval 24 hours of Vitals/Labs/Micro,and imaging results reviewed as available. See assessment.   5/10 AF WBC 3 continued c/o constipation denies HA, visual changes, neuro deficits  5/11 AF WBC 8.8 isolation stopped due to resolution neutropenia-sleepy today  5/12 AF no CBC somnolent-no acute events noted  5/13 AF WBC 6.2 c/o pain left hip today, unchanged Worse with movement and improved with ice. Refused PT yesterday   5/14 AF c/o dizzyness whenever she tirns on her side-no new HA or visual changes-still havng hip pain  5/15 AF sleeping at time of rounds-by report ambulating  5/16 AF weightbearing continues to improve-ambulating more.  No new complaints  5/20- still has some pain in the hip but ambulating without any issues. No fevers. In good spirits.  5/22/2019 continues to have hip pain.  No fevers have been noted  5/24 AF no CBC plan for CHAS today. Denies any headaches. States she is ambulating  5/25 afebrile CBC not done today.  Patient CHAS was negative.  She has a poor appetite and is requesting an appetite stimulant.  Ongoing left hip pain.  Plan for CT-guided drainage tomorrow  5/28 afebrile WBC 4.5.  Patient is resting comfortably.  She states that she has right breast pain.  Ultrasound of the breast otherwise unremarkable.  She is awaiting CT-guided drainage which has been delayed as CT scan malfunctioning yesterday.  5/29 afebrile, no CBC.  Patient denies any right hip pain.  Continues to  have left-sided hip pain especially with sitting. She had a repeat CT of the pelvis yesterday that revealed no changes in the abscess.  Per report, abscesses are too small for IR drainage given location and recommendation to transfer the patient to [facility] for CT-guided aspiration of the fluid for culture.  She continues to deny any fevers or chills.   AF c/o hungry and right hip pain Awaiting procedure. Denies SE abx  2019-no fevers.  Complains of pain in the hips.  Underwent aspiration of the pelvic abscess.  The cultures are negative  2019-no fevers.  Continues to complain of significant pain in her hips.  Pain medications are being adjusted.  2019-no fevers.  Continues to remain off the antibiotics.  Awaiting Ortho evaluation  2019 patient febrile up to 103.  Having shaking chills.  Is not feeling well.  Denies any specific complaints.  6/10 AF WBC 5.3 somnolent No fever or new complaints  Labs Reviewed, Medications Reviewed, Radiology Reviewed and Wound Reviewed.    Review of Systems:  Review of Systems   Constitutional: Negative for chills and fever.   Gastrointestinal: Negative for nausea and vomiting.   Musculoskeletal: Positive for joint pain.   All other systems reviewed and are negative.      Hemodynamics:  Temp (24hrs), Av.7 °C (98 °F), Min:36.4 °C (97.5 °F), Max:37.1 °C (98.8 °F)  Temperature: 36.7 °C (98 °F)  Pulse  Av.9  Min: 60  Max: 125  Blood Pressure : (!) 169/97 (RN Notified )       Physical Exam:  Physical Exam   Constitutional: No distress.   HENT:   Mouth/Throat: Oropharynx is clear and moist. No oropharyngeal exudate.   Eyes: Pupils are equal, round, and reactive to light. EOM are normal. No scleral icterus.   Neck: Neck supple. No JVD present.   Cardiovascular: Normal rate.    No murmur heard.  Pulmonary/Chest: Effort normal. No stridor. No respiratory distress. She has no wheezes. She has no rales.   Abdominal: Soft. She exhibits no distension. There is  no tenderness. There is no rebound.   Musculoskeletal: She exhibits tenderness. She exhibits no edema.   Neurological: She displays normal reflexes. She exhibits normal muscle tone.   somnolent   Skin: Skin is warm. She is not diaphoretic.   Nursing note and vitals reviewed.      Meds:    Current Facility-Administered Medications:   •  potassium chloride SA  •  potassium chloride SA  •  magnesium oxide  •  vancomycin  •  fluconazole  •  MD Alert...Vancomycin per Pharmacy  •  cefepime  •  acetaminophen  •  LORazepam  •  enoxaparin (LOVENOX) injection  •  tramadol  •  insulin regular **AND** Accu-Chek ACHS **AND** NOTIFY MD and PharmD **AND** glucose **AND** dextrose 10% bolus  •  NS  •  pramipexole  •  lidocaine  •  oxyCODONE immediate-release  •  temazepam  •  morphine injection  •  cyclobenzaprine  •  Respiratory Care per Protocol  •  amLODIPine  •  cinacalcet  •  losartan  •  acetaminophen  •  atorvastatin  •  omeprazole  •  metoprolol  •  senna-docusate **AND** polyethylene glycol/lytes **AND** magnesium hydroxide **AND** bisacodyl  •  ondansetron  •  ondansetron    Labs:  Recent Labs      06/08/19   0055  06/09/19   0057  06/10/19   0011   WBC  4.9  5.0  5.3   RBC  3.74*  3.81*  3.71*   HEMOGLOBIN  10.8*  10.7*  10.6*   HEMATOCRIT  32.6*  32.5*  32.4*   MCV  87.2  85.3  87.3   MCH  28.9  28.1  28.6   RDW  57.2*  55.8*  56.9*   PLATELETCT  206  214  218   MPV  10.3  9.6  9.7   NEUTSPOLYS  58.40  59.20  59.80   LYMPHOCYTES  15.30*  19.00*  17.70*   MONOCYTES  11.60  12.80  13.50*   EOSINOPHILS  13.70*  7.80*  7.30*   BASOPHILS  0.80  1.00  1.10     Recent Labs      06/08/19   0055  06/09/19   0057  06/10/19   0011   SODIUM  135  135  134*   POTASSIUM  3.5*  3.6  3.1*   CHLORIDE  102  103  103   CO2  23  25  24   GLUCOSE  92  79  111*   BUN  10  7*  6*     Recent Labs      06/08/19 0055 06/09/19 0057 06/10/19   0011   CREATININE  0.40*  0.38*  0.34*       Imaging:  Ct-pelvis With    Result Date:  5/28/2019 5/28/2019 12:54 PM HISTORY/REASON FOR EXAM: Follow-up abscess TECHNIQUE/EXAM DESCRIPTION AND NUMBER OF VIEWS: CT scan of the pelvis with contrast. Contrast-enhanced helical scanning of the pelvis was obtained from the iliac crests through the pubic symphysis following the bolus administration of 100 mL of Omnipaque 350 nonionic contrast without complication. Low dose optimization technique was utilized for this CT exam including automated exposure control and adjustment of the mA and/or kV according to patient size. COMPARISON: MRI scan of the pelvis 5/20/2019 and CT chest abdomen and pelvis 4/23/2019 FINDINGS: Again redemonstrated is a 3.2 x 2 cm multiloculated low-attenuation collection in the right iliacus muscle unchanged from recent MRI scan of the pelvis. There is also a 2.8 x 1.7 cm low-attenuation collection in the right gluteus medius muscle. This collection is also unchanged from recent MRI scan of the pelvis. Previous fixation screws are noted coursing through the right lateral ilium across the sacrum extending into the left lateral ilium. There is a healing fracture of the right posterior ilium. There is no evidence of free fluid in the pelvis or pelvic mass. There are scattered air-fluid levels throughout the small bowel     1.  Stable abscesses again redemonstrated in the right iliac's muscle and right gluteus medius muscle. 2.  Status post fixation screws coursing through the jose antonio and sacrum for treatment of healing fracture in the right posterior ilium. 3.  Adynamic ileus.    Dx-chest-portable (1 View)    Result Date: 5/21/2019 5/21/2019 2:30 PM HISTORY/REASON FOR EXAM:  Right-sided neck pain. TECHNIQUE/EXAM DESCRIPTION AND NUMBER OF VIEWS: Single portable view of the chest. COMPARISON: 4/30/2019 FINDINGS: Single portable view of the chest demonstrates a normal cardiac silhouette and mediastinal contours. Calcification is in the aortic knob. No discrete opacity, pleural fluid, or  pneumothorax. A right PICC line remains in place. The tip appears to be located at the cavoatrial junction.     1.  No acute cardiopulmonary findings. 2.  Right PICC line place with the tip projecting over the cavoatrial junction    Dx-chest-portable (1 View)    Result Date: 4/30/2019 4/30/2019 4:19 PM HISTORY/REASON FOR EXAM:  PICC line placement. IV access necessity. TECHNIQUE/EXAM DESCRIPTION AND NUMBER OF VIEWS: Single portable view of the chest. COMPARISON:  4/22/2019 FINDINGS: A right arm PICC line is present in satisfactory position with its tip projecting over the SVC at the level of the jennifer. The cardiac silhouette is within normal limits. No infiltrates or consolidations are noted. No pleural effusion is noted.     1.  Right arm PICC line tip projects in satisfactory position. 2.  Clear chest.    Mr-brain-with & W/o    Result Date: 5/21/2019 5/20/2019 3:37 PM HISTORY/REASON FOR EXAM:  Follow-up brain abscesses. TECHNIQUE/EXAM DESCRIPTION:   MRI of the brain without and with contrast. T1 sagittal, T2 fast spin-echo axial, T1 coronal, FLAIR coronal, diffusion-weighted and apparent diffusion coefficient (ADC map) axial images were obtained of the whole brain. T1 postcontrast axial and T1 postcontrast coronal images were obtained. The study was performed on a [employer] Signa 1.5 Miranda MRI scanner. 6 mL Gadavist contrast was administered intravenously. COMPARISON:  MRI scan of the brain 4/24/2019 FINDINGS: There are innumerable small ovoid enhancing foci throughout the brain parenchyma, both in the supratentorial and infratentorial compartments. The number of these lesions has increased significantly since previous exam. The previously identified largest lesion in the right brachium pontis has resolved. There is evidence of increased T2 signal intensity in many of these lesions and there is surrounding increased T2 signal intensity in several lesions in the right superior frontal lobe. The calvariae are  unremarkable. There are no extra-axial fluid collections. The ventricular system and basal cisterns are mild to moderately prominent. There is mild-to-moderate prominence of the cortical sulci and gyri. There are no mass effects or shift of midline structures. There are no hemorrhagic lesions. The diffusion-weighted axial images show no evidence of acute cerebral infarction. The brainstem and posterior fossa structures are unremarkable. Vascular flow voids in the vertebrobasilar and carotid arteries, [place], and dural venous sinuses are intact. The paranasal sinuses and mastoids in the field of view are unremarkable.     1.  Increase in number of innumerable small ovoid enhancing lesions throughout the brain parenchyma. These lesions may represent microabscesses of either bacterial or fungal origin or possibly brain metastases. 2.  Age-related cerebral atrophy.    Mr-pelvis-with & W/o And Sequences    Result Date: 5/21/2019 5/20/2019 3:37 PM HISTORY/REASON FOR EXAM:  Abd pain, fever, abscess suspected; evaluate abscesses TECHNIQUE/EXAM DESCRIPTION: MRI of the pelvis without and with contrast. The study was performed on a [employer] Signa 1.5 Miranda MRI scanner. T1 axial, T1 coronal, T2 fast spin-echo fat-suppressed axial, and T2 fast spin-echo fat-suppressed coronal images were obtained of the pelvis. Postcontrast fat-suppressed intravenous gadolinium enhanced sequences in the axial and coronal planes were then  performed. 6 mL Gadavist contrast was administered intravenously. COMPARISON: 3/26/2019. CT 4/23/2019 FINDINGS: Interval decrease in size of the collection in the right gluteal medius muscle, measuring 1.4 x 1.9 cm, previously 2.3 x 2.8 cm. There is minimal surrounding stranding. Mild heterogeneity and stranding in the overlying right gluteus cornelio muscle without discrete collection. Grossly unchanged in size of the multiloculated collection in the right iliacus muscle, measuring 2.4 x 3.5 cm.  Postsurgical change in the sacrum with susceptibility artifact from the screws. Moderate osteoarthritis of the bilateral hip joints.     1. Interval decrease in size of the collection in the right gluteal medius muscle, measuring 1.4 x 1.9 cm, previously 2.3 x 2.8 cm. There is minimal surrounding stranding. Mild heterogeneity and stranding in the overlying right gluteus cornelio muscle without discrete collection, could relate to reactive change or phlegmon. 2. Grossly unchanged in size of the multiloculated collection in the right iliacus muscle, measuring 2.4 x 3.5 cm.    Us-chest    Result Date: 2019 10:23 AM HISTORY/REASON FOR EXAM:  Right lateral breast pain, evaluate implant TECHNIQUE/EXAM DESCRIPTION AND NUMBER OF VIEWS: Targeted ultrasound of the right breast. COMPARISON: None FINDINGS: No gross mass or implant rupture are identified. Diagnostic assessment is not feasible lack of proper equipment tube performed breast imaging     1.  Nondiagnostic assessment of the right breast without gross implant rupture identified 2.  Recommend further assessment with mammography and ultrasound at an accredited breast facility    Ec-halie W/o Cont    Result Date: 2019  Transesophageal Echo Report Echocardiography Laboratory CONCLUSIONS Normal esophageal echocardiogram. No evidence of endocarditis. No significant change since the prior study of 3/15/2019. MARLENA CLIFFORD Exam Date:          2019                     12:48 Exam Location:      Inpatient Priority:            Routine Ordering Physician:        DARON WHELAN Referring Physician: Sonographer:               CATALINO Rivers Age:    68     Gender:    F MRN:    [id number] :    [id number] BSA:           Ht (in):           Wt (lb): Report Type:      Complete Indications:     Endocarditis and heart valve disorders in diseases                  classified elsewhere ICD Codes:        I39 CPT Codes:       16968 BP:          /          HR: Technical Quality:       Fair MEASUREMENTS  (Male / Female) Normal Values 2D ECHO Estimated LV Ejection Fraction    65 %                  * Indicates values subject to auto-interpretation LV EF:  65    % Medications Medications determined by anesthesiologist. Limitations none Complications none Proc. Components The probe was inserted and manipulated by Dr Potter. Probe #SM  was used for this procedure. 2D, color Doppler, spectral Doppler, and 3D imaging were used as part of the evaluation as clinically indicated. FINDINGS Left Ventricle The left ventricle was normal in size and function. Right Ventricle The right ventricle was normal in size and function. Right Atrium The right atrium is normal in size. Left Atrium The left atrium is normal in size. LA Appendage Normal left atrial appendage. IA Septum The interatrial septum is normal. IV Septum The interventricular septum is normal. Mitral Valve Structurally normal mitral valve without significant stenosis or regurgitation.  No valvular vegetations. Aortic Valve Structurally normal aortic valve without significant stenosis or regurgitation.  No valvular vegetations. Tricuspid Valve Structurally normal tricuspid valve without significant stenosis or regurgitation.  No valvular vegetations. Pulmonic Valve Structurally normal pulmonic valve without significant stenosis or regurgitation.  No valvular vegetations. Pericardium Normal pericardium without effusion. Aorta The aortic root is normal. Christopher Potter MD (Electronically Signed) Final Date:     24 May 2019 15:42      Micro:  Results     Procedure Component Value Units Date/Time    URINALYSIS [[id number]]     Order Status:  No result Specimen:  Urine from Urine, Clean Catch     BLOOD CULTURE [[id number]] Collected:  06/04/19 1512    Order Status:  Completed Specimen:  Blood from Peripheral Updated:  06/09/19 1700     Significant Indicator NEG     Source  "BLD     Site PERIPHERAL     Culture Result No growth after 5 days of incubation.    Narrative:       Per Hospital Policy: Only change Specimen Src: to \"Line\" if  specified by physician order.  Left Hand    BLOOD CULTURE [[id number]] Collected:  06/04/19 1512    Order Status:  Completed Specimen:  Blood from Peripheral Updated:  06/09/19 1700     Significant Indicator NEG     Source BLD     Site PERIPHERAL     Culture Result No growth after 5 days of incubation.    Narrative:       Per Hospital Policy: Only change Specimen Src: to \"Line\" if  specified by physician order.  Left Forearm/Arm    Cryptococcal Antigen [[id number]]     Order Status:  Sent Specimen:  Blood     Cryptococcal Antigen [[id number]]     Order Status:  Canceled Specimen:  CSF     Cryptococcal Antigen [[id number]]     Order Status:  Sent Specimen:  Blood     Cryptococcal Antigen Titer [[id number]]     Order Status:  Canceled Specimen:  Blood from Blood     Cryptococcal Antigen [[id number]]     Order Status:  Canceled Specimen:  Blood     C Diff by PCR rflx Toxin [[id number]] Collected:  06/06/19 0900    Order Status:  Completed Specimen:  Stool from Stool Updated:  06/06/19 1541     C Diff by PCR Negative     Comment: C. difficile NOT detected by PCR.  Treatment not indicated per guidelines.  Repeat testing not indicated within 7 days.          027-NAP1-BI Presumptive Negative     Comment: Presumptive 027/NAP1/BI target DNA sequences are NOT DETECTED.       Narrative:       Special Contact [phone] MERYL PICKENS  Does this patient have risk factors for C-diff?->Yes    Culture Respiratory W/ GRM STN [[id number]]     Order Status:  Completed Specimen:  Respirate from Sputum     URINALYSIS [[id number]]     Order Status:  No result Specimen:  Urine     BLOOD CULTURE [[id number]] Collected:  06/04/19 0000    Order Status:  Canceled Specimen:  Other from Peripheral     BLOOD CULTURE [[id number]] Collected:  06/04/19 0000    Order Status:  Canceled " "Specimen:  Other from Peripheral           Assessment:  Gluteal and iliopsoas abscess  Brain abscesses vs mets  Breast cancer  DM      Plan:  FUO  Fever and chills 6/4  Afebrile now  Panculture neg  1, 3 beta glucan neg  QuantiGold+. 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   Afebrile  No leukocytosis  Adjacent to hardware in right hip (placed 12/17/18)  CT 4/23 \"Fluid collections within the right gluteal and iliacis muscles.. The collection within the right gluteal muscle has unchanged while the fluid collection within the right iliacis muscle is increased somewhat in size.\" 2.7 cm  Cultures 3/29 and 4/4 neg  MRI on 5/20/2019 - interval decrease in collection in R gluteal muscle and persistent multiloculated collection in R iliacus muscle.   CT 5/28 no change  s/p drainage on 5/30/2019-cultures negative  S/p removal hardware 6/5-no cultures done     Brain abscesses vs mets  MRI 4/23 \"supra and infratentorial brain parenchyma. Decrease in the size of the lesions. There are also interval reduction in the extent of the surrounding white matter edema in the restricted diffusion consistent with improvement/treatment response of the most of the multifocal brain abscesses.  Repeat MRI 5/21 showed innumerable microabscesses vs mets  Mult blood cultures neg  CHAS neg 3/15 and 5/24  MRI on 6/7 with interval progression of supra and infratentorial intra--axial nodules and associated edema.  New right thalamus lesion. Radiology favors infection over neoplasm though both remain considerations (had been off abx)  Neurosurgical evaluation - recommended continuing antibiotics for another 2 weeks and then repeating MRI brain, per neurosurgery the lesions are not amenable to biopsy   In view of initial improvement on vanco/cefepime and deterioration after " discontinuation-resumed abx  Plan for repeat MRI 6/21/2019  Monitor renal function while on vanco  History of pelvic fracture  Status post pin placement and sacral  Eventually will need removal     Type 2 DM  Hemoglobin A1c 6.3   Keep BS under 150 to help control current infection      DW IM

## 2019-06-10 NOTE — DISCHARGE PLANNING
Anticipated Discharge Disposition: D/C to SNF    Action: Pt has been accepted to [facility], however, she is not yet medically clear. Pt is pending medical testing due to uncertainty regarding lesions/absesses in her brain. She is anticipated to remain in [facility] approximately 2 weeks prior to being clear for transfer.     Barriers to Discharge: Medical clearance.     Plan: LSW to arrange transport to SNF once pt is medically clear.

## 2019-06-10 NOTE — PROGRESS NOTES
"Pharmacy Kinetics 68 y.o. female on vancomycin day # 7 6/10/2019    Currently on Vancomycin 700 mg iv q12hr (00, 12)     Indication for Treatment: Brain abscess, iliopsoas abscess     Pertinent history per medical record: Admitted on 5/29/2019 for pelvic abscess. Patient was recently admitted to [facility] from 3/8-4/30 for fever. She was found to have possible brain abscess as well as iliopsoas abscess. ID was consulted during that admission and she was treated with meropenem and vancomycin, with plans for 6 weeks of antibiotics. She eventually transferred to LT[facility] for completion of antibiotics. CT scan on 5/28 again demonstrated pelvic abscess, prompting return to [facility] for I&D, which was performed with IR on 5/30. She had removal of possibly infected hardware on 6/5. MRI showed increase in the number of innumerable small ovoid enhancing lesions throughout the brain parenchyma (possible brain abscess vs metastatic lesions). ID is following.     Other antibiotics: cefepime 2 g IV q12h    Allergies: Patient has no known allergies.     List concerns for renal function: age     Pertinent cultures to date:   06/04/19: C.Diff: Negative   06/04/19: PBCx2: NGTD   05/30/19: Pelvic abscess,BF: NGTD     MRSA nares swab if pneumonia is a concern (ordered/positive/negative/n-a): NA      Recent Labs      06/08/19   0055  06/09/19   0057  06/10/19   0011   WBC  4.9  5.0  5.3   NEUTSPOLYS  58.40  59.20  59.80     Recent Labs      06/08/19   0055  06/09/19   0057  06/10/19   0011   BUN  10  7*  6*   CREATININE  0.40*  0.38*  0.34*     Recent Labs      06/08/19   1655  06/10/19   1122   VANCOTROUGH  11.3  21.6*     Intake/Output Summary (Last 24 hours) at 06/10/19 1348  Last data filed at 06/10/19 1242   Gross per 24 hour   Intake             2015 ml   Output              900 ml   Net             1115 ml      /66   Pulse 97   Temp 36.7 °C (98 °F) (Temporal)   Resp 18   Ht 1.575 m (5' 2\")   Wt 55.4 kg (122 lb 2.2 oz)   SpO2 93%  " Temp (24hrs), Av.7 °C (98 °F), Min:36.4 °C (97.5 °F), Max:37.1 °C (98.8 °F)      A/P   1. Vancomycin dose change: yes, begin 600mg q12hr (00, 12)  2. Next vancomycin level:  at 1100; ordered  3. Goal trough: ~18mcg/mL (brain abscess)  4. Comments: Repeat Vancomycin trough increased from 11 to 21.6 after 2 doses of increased regimen.  Clinical course is no further interventions by NSGY with repeat MRI in ~2 weeks; treating as infectious pathology.  Remains no culture growth. Renal indices stable. Will reduce regimen slight and measure at steady state.    Erwin Christiansen, PharmD, BCPS

## 2019-06-10 NOTE — CARE PLAN
Problem: Nutritional:  Goal: Achieve adequate nutritional intake  Start on PO diet and Patient will consume >50% of meals   Outcome: MET Date Met: 06/10/19  See RD note

## 2019-06-10 NOTE — PROGRESS NOTES
"Pt frequently urinating large amounts of very clear, dilute urine. Sometimes she can hold it long enough to use the bedpan, and other times incontinent of urine, therefore making it difficult to measure accurate output. Pt is constantly thirsty and asks for a cup of liquid to drink every time staff enters room and drinks it quickly. States\" I don't know why I am so thirsty and my mouth feels so dry. I also don't know why I pee so much\". Blood sugars have been on the lower end of normal regardless of what the pt drinks.  "

## 2019-06-11 LAB
ANION GAP SERPL CALC-SCNC: 8 MMOL/L (ref 0–11.9)
BASOPHILS # BLD AUTO: 0.9 % (ref 0–1.8)
BASOPHILS # BLD: 0.08 K/UL (ref 0–0.12)
BUN SERPL-MCNC: 9 MG/DL (ref 8–22)
C IMMITIS IGM SPEC QL IA: 0.4 IV
CALCIUM SERPL-MCNC: 7.9 MG/DL (ref 8.5–10.5)
CHLORIDE SERPL-SCNC: 103 MMOL/L (ref 96–112)
CO2 SERPL-SCNC: 23 MMOL/L (ref 20–33)
COCCIDIOIDES AB SPEC QL ID: NORMAL
COCCIDIOIDES AB TITR SER CF: NORMAL {TITER}
COCCIDIOIDES IGG SPEC QL IA: 0.2 IV
CREAT SERPL-MCNC: 0.49 MG/DL (ref 0.5–1.4)
EOSINOPHIL # BLD AUTO: 0.36 K/UL (ref 0–0.51)
EOSINOPHIL NFR BLD: 4 % (ref 0–6.9)
ERYTHROCYTE [DISTWIDTH] IN BLOOD BY AUTOMATED COUNT: 58 FL (ref 35.9–50)
GLUCOSE BLD-MCNC: 85 MG/DL (ref 65–99)
GLUCOSE BLD-MCNC: 87 MG/DL (ref 65–99)
GLUCOSE BLD-MCNC: 94 MG/DL (ref 65–99)
GLUCOSE BLD-MCNC: 96 MG/DL (ref 65–99)
GLUCOSE SERPL-MCNC: 100 MG/DL (ref 65–99)
HCT VFR BLD AUTO: 35.4 % (ref 37–47)
HGB BLD-MCNC: 11.4 G/DL (ref 12–16)
IMM GRANULOCYTES # BLD AUTO: 0.07 K/UL (ref 0–0.11)
IMM GRANULOCYTES NFR BLD AUTO: 0.8 % (ref 0–0.9)
LYMPHOCYTES # BLD AUTO: 1.55 K/UL (ref 1–4.8)
LYMPHOCYTES NFR BLD: 17.3 % (ref 22–41)
MCH RBC QN AUTO: 27.7 PG (ref 27–33)
MCHC RBC AUTO-ENTMCNC: 32.2 G/DL (ref 33.6–35)
MCV RBC AUTO: 86.1 FL (ref 81.4–97.8)
MONOCYTES # BLD AUTO: 1.07 K/UL (ref 0–0.85)
MONOCYTES NFR BLD AUTO: 11.9 % (ref 0–13.4)
NEUTROPHILS # BLD AUTO: 5.84 K/UL (ref 2–7.15)
NEUTROPHILS NFR BLD: 65.1 % (ref 44–72)
NRBC # BLD AUTO: 0 K/UL
NRBC BLD-RTO: 0 /100 WBC
PLATELET # BLD AUTO: 261 K/UL (ref 164–446)
PMV BLD AUTO: 9.9 FL (ref 9–12.9)
POTASSIUM SERPL-SCNC: 4.8 MMOL/L (ref 3.6–5.5)
RBC # BLD AUTO: 4.11 M/UL (ref 4.2–5.4)
SODIUM SERPL-SCNC: 134 MMOL/L (ref 135–145)
WBC # BLD AUTO: 9 K/UL (ref 4.8–10.8)

## 2019-06-11 PROCEDURE — 99232 SBSQ HOSP IP/OBS MODERATE 35: CPT | Performed by: INTERNAL MEDICINE

## 2019-06-11 PROCEDURE — 700111 HCHG RX REV CODE 636 W/ 250 OVERRIDE (IP): Performed by: INTERNAL MEDICINE

## 2019-06-11 PROCEDURE — 82962 GLUCOSE BLOOD TEST: CPT | Mod: 91

## 2019-06-11 PROCEDURE — 700111 HCHG RX REV CODE 636 W/ 250 OVERRIDE (IP): Performed by: NURSE PRACTITIONER

## 2019-06-11 PROCEDURE — 97166 OT EVAL MOD COMPLEX 45 MIN: CPT

## 2019-06-11 PROCEDURE — 97162 PT EVAL MOD COMPLEX 30 MIN: CPT

## 2019-06-11 PROCEDURE — 700102 HCHG RX REV CODE 250 W/ 637 OVERRIDE(OP): Performed by: HOSPITALIST

## 2019-06-11 PROCEDURE — 80048 BASIC METABOLIC PNL TOTAL CA: CPT

## 2019-06-11 PROCEDURE — 700102 HCHG RX REV CODE 250 W/ 637 OVERRIDE(OP): Performed by: NURSE PRACTITIONER

## 2019-06-11 PROCEDURE — 302129 PCA PLUS: Performed by: INTERNAL MEDICINE

## 2019-06-11 PROCEDURE — A9270 NON-COVERED ITEM OR SERVICE: HCPCS | Performed by: INTERNAL MEDICINE

## 2019-06-11 PROCEDURE — 36415 COLL VENOUS BLD VENIPUNCTURE: CPT

## 2019-06-11 PROCEDURE — A9270 NON-COVERED ITEM OR SERVICE: HCPCS | Performed by: HOSPITALIST

## 2019-06-11 PROCEDURE — 700101 HCHG RX REV CODE 250: Performed by: NURSE PRACTITIONER

## 2019-06-11 PROCEDURE — 85025 COMPLETE CBC W/AUTO DIFF WBC: CPT

## 2019-06-11 PROCEDURE — 700105 HCHG RX REV CODE 258: Performed by: INTERNAL MEDICINE

## 2019-06-11 PROCEDURE — A9270 NON-COVERED ITEM OR SERVICE: HCPCS | Performed by: NURSE PRACTITIONER

## 2019-06-11 PROCEDURE — 700102 HCHG RX REV CODE 250 W/ 637 OVERRIDE(OP): Performed by: INTERNAL MEDICINE

## 2019-06-11 PROCEDURE — 99233 SBSQ HOSP IP/OBS HIGH 50: CPT | Performed by: INTERNAL MEDICINE

## 2019-06-11 PROCEDURE — 770001 HCHG ROOM/CARE - MED/SURG/GYN PRIV*

## 2019-06-11 RX ORDER — HYDROMORPHONE HYDROCHLORIDE 1 MG/ML
1 INJECTION, SOLUTION INTRAMUSCULAR; INTRAVENOUS; SUBCUTANEOUS ONCE
Status: COMPLETED | OUTPATIENT
Start: 2019-06-11 | End: 2019-06-11

## 2019-06-11 RX ADMIN — PRAMIPEXOLE DIHYDROCHLORIDE 1 MG: 0.5 TABLET ORAL at 11:34

## 2019-06-11 RX ADMIN — OXYCODONE HYDROCHLORIDE 10 MG: 10 TABLET ORAL at 05:59

## 2019-06-11 RX ADMIN — SENNOSIDES AND DOCUSATE SODIUM 2 TABLET: 8.6; 5 TABLET ORAL at 18:21

## 2019-06-11 RX ADMIN — SENNOSIDES AND DOCUSATE SODIUM 2 TABLET: 8.6; 5 TABLET ORAL at 05:59

## 2019-06-11 RX ADMIN — ACETAMINOPHEN 1000 MG: 500 TABLET ORAL at 05:59

## 2019-06-11 RX ADMIN — MAGNESIUM OXIDE TAB 400 MG (241.3 MG ELEMENTAL MG) 400 MG: 400 (241.3 MG) TAB at 05:59

## 2019-06-11 RX ADMIN — RIVAROXABAN 15 MG: 15 TABLET, FILM COATED ORAL at 18:20

## 2019-06-11 RX ADMIN — OMEPRAZOLE 20 MG: 20 CAPSULE, DELAYED RELEASE ORAL at 05:59

## 2019-06-11 RX ADMIN — PRAMIPEXOLE DIHYDROCHLORIDE 1 MG: 0.5 TABLET ORAL at 18:20

## 2019-06-11 RX ADMIN — HYDROMORPHONE HYDROCHLORIDE 1 MG: 1 INJECTION, SOLUTION INTRAMUSCULAR; INTRAVENOUS; SUBCUTANEOUS at 18:43

## 2019-06-11 RX ADMIN — PRAMIPEXOLE DIHYDROCHLORIDE 1 MG: 0.5 TABLET ORAL at 05:59

## 2019-06-11 RX ADMIN — LIDOCAINE 2 PATCH: 50 PATCH CUTANEOUS at 11:34

## 2019-06-11 RX ADMIN — METOPROLOL TARTRATE 25 MG: 25 TABLET, FILM COATED ORAL at 05:59

## 2019-06-11 RX ADMIN — POTASSIUM CHLORIDE 40 MEQ: 1500 TABLET, EXTENDED RELEASE ORAL at 05:59

## 2019-06-11 RX ADMIN — VANCOMYCIN HYDROCHLORIDE 600 MG: 100 INJECTION, POWDER, LYOPHILIZED, FOR SOLUTION INTRAVENOUS at 11:34

## 2019-06-11 RX ADMIN — LOSARTAN POTASSIUM 50 MG: 50 TABLET ORAL at 05:59

## 2019-06-11 RX ADMIN — AMLODIPINE BESYLATE 10 MG: 5 TABLET ORAL at 05:59

## 2019-06-11 RX ADMIN — OXYCODONE HYDROCHLORIDE 10 MG: 10 TABLET ORAL at 15:32

## 2019-06-11 RX ADMIN — ACETAMINOPHEN 1000 MG: 500 TABLET ORAL at 18:20

## 2019-06-11 RX ADMIN — VANCOMYCIN HYDROCHLORIDE 600 MG: 100 INJECTION, POWDER, LYOPHILIZED, FOR SOLUTION INTRAVENOUS at 01:32

## 2019-06-11 RX ADMIN — METOPROLOL TARTRATE 25 MG: 25 TABLET, FILM COATED ORAL at 18:21

## 2019-06-11 RX ADMIN — ACETAMINOPHEN 650 MG: 325 TABLET, FILM COATED ORAL at 20:47

## 2019-06-11 RX ADMIN — ATORVASTATIN CALCIUM 10 MG: 10 TABLET, FILM COATED ORAL at 18:21

## 2019-06-11 RX ADMIN — CEFEPIME 2 G: 2 INJECTION, POWDER, FOR SOLUTION INTRAVENOUS at 22:30

## 2019-06-11 RX ADMIN — HYDROMORPHONE HYDROCHLORIDE: 10 INJECTION, SOLUTION INTRAMUSCULAR; INTRAVENOUS; SUBCUTANEOUS at 20:05

## 2019-06-11 RX ADMIN — CALCIUM GLUCONATE 1 G: 98 INJECTION, SOLUTION INTRAVENOUS at 19:01

## 2019-06-11 RX ADMIN — CEFEPIME 2 G: 2 INJECTION, POWDER, FOR SOLUTION INTRAVENOUS at 06:00

## 2019-06-11 RX ADMIN — MORPHINE SULFATE 4 MG: 4 INJECTION INTRAVENOUS at 15:57

## 2019-06-11 RX ADMIN — OXYCODONE HYDROCHLORIDE 10 MG: 10 TABLET ORAL at 01:46

## 2019-06-11 RX ADMIN — CINACALCET HYDROCHLORIDE 60 MG: 30 TABLET, COATED ORAL at 06:01

## 2019-06-11 RX ADMIN — FLUCONAZOLE 800 MG: 200 TABLET ORAL at 06:01

## 2019-06-11 ASSESSMENT — ENCOUNTER SYMPTOMS
ABDOMINAL PAIN: 0
MYALGIAS: 1
SHORTNESS OF BREATH: 0
CONSTIPATION: 0
DIARRHEA: 0
BLURRED VISION: 0
BLOOD IN STOOL: 0
DIZZINESS: 0
HEADACHES: 0
FEVER: 0
CHILLS: 0
NAUSEA: 0
VOMITING: 0

## 2019-06-11 ASSESSMENT — COGNITIVE AND FUNCTIONAL STATUS - GENERAL
TOILETING: A LOT
DRESSING REGULAR UPPER BODY CLOTHING: A LITTLE
HELP NEEDED FOR BATHING: A LOT
MOVING TO AND FROM BED TO CHAIR: UNABLE
SUGGESTED CMS G CODE MODIFIER DAILY ACTIVITY: CK
MOBILITY SCORE: 13
STANDING UP FROM CHAIR USING ARMS: A LITTLE
CLIMB 3 TO 5 STEPS WITH RAILING: A LOT
WALKING IN HOSPITAL ROOM: A LITTLE
SUGGESTED CMS G CODE MODIFIER MOBILITY: CL
TURNING FROM BACK TO SIDE WHILE IN FLAT BAD: A LITTLE
MOVING FROM LYING ON BACK TO SITTING ON SIDE OF FLAT BED: UNABLE
DRESSING REGULAR LOWER BODY CLOTHING: A LOT
DAILY ACTIVITIY SCORE: 17

## 2019-06-11 ASSESSMENT — ACTIVITIES OF DAILY LIVING (ADL): TOILETING: INDEPENDENT

## 2019-06-11 ASSESSMENT — GAIT ASSESSMENTS
DEVIATION: BRADYKINETIC;STEP TO
GAIT LEVEL OF ASSIST: MINIMAL ASSIST
DISTANCE (FEET): 5
ASSISTIVE DEVICE: FRONT WHEEL WALKER

## 2019-06-11 NOTE — PROGRESS NOTES
Infectious Disease Progress Note    Author: Karen Garcia M.D. Date & Time of service: 6/11/2019  2:27 PM    Chief Complaint:  Brain abscess, iliopsoas abscess, leukopenia      Interval History:  68 y.o. female admitted 5/29/2019 as a transfer from [facility] since 4/30/2019. + diabetes, SANTOSH of right hip with hardware, and breast cancer who was originally admitted to [facility] on 03/08/2019 for right hip and pelvic pain.  Work-up revealed a right iliopsoas lesion, gluteal abscess, and mult brain abscesses  5/6 Interval 24 hours of Vitals/Labs/Micro,and imaging results reviewed as available. See assessment.   5/10 AF WBC 3 continued c/o constipation denies HA, visual changes, neuro deficits  5/11 AF WBC 8.8 isolation stopped due to resolution neutropenia-sleepy today  5/12 AF no CBC somnolent-no acute events noted  5/13 AF WBC 6.2 c/o pain left hip today, unchanged Worse with movement and improved with ice. Refused PT yesterday   5/14 AF c/o dizzyness whenever she tirns on her side-no new HA or visual changes-still havng hip pain  5/15 AF sleeping at time of rounds-by report ambulating  5/16 AF weightbearing continues to improve-ambulating more.  No new complaints  5/20- still has some pain in the hip but ambulating without any issues. No fevers. In good spirits.  5/22/2019 continues to have hip pain.  No fevers have been noted  5/24 AF no CBC plan for CHAS today. Denies any headaches. States she is ambulating  5/25 afebrile CBC not done today.  Patient CHAS was negative.  She has a poor appetite and is requesting an appetite stimulant.  Ongoing left hip pain.  Plan for CT-guided drainage tomorrow  5/28 afebrile WBC 4.5.  Patient is resting comfortably.  She states that she has right breast pain.  Ultrasound of the breast otherwise unremarkable.  She is awaiting CT-guided drainage which has been delayed as CT scan malfunctioning yesterday.  5/29 afebrile, no CBC.  Patient denies any right hip pain.  Continues to  have left-sided hip pain especially with sitting. She had a repeat CT of the pelvis yesterday that revealed no changes in the abscess.  Per report, abscesses are too small for IR drainage given location and recommendation to transfer the patient to [facility] for CT-guided aspiration of the fluid for culture.  She continues to deny any fevers or chills.   AF c/o hungry and right hip pain Awaiting procedure. Denies SE abx  2019-no fevers.  Complains of pain in the hips.  Underwent aspiration of the pelvic abscess.  The cultures are negative  2019-no fevers.  Continues to complain of significant pain in her hips.  Pain medications are being adjusted.  2019-no fevers.  Continues to remain off the antibiotics.  Awaiting Ortho evaluation  2019 patient febrile up to 103.  Having shaking chills.  Is not feeling well.  Denies any specific complaints.  6/10 AF WBC 5.3 somnolent No fever or new complaints   AF WBC 9 No fever or chills-some pain at surgical site. Limited participation with PT noted  Labs Reviewed, Medications Reviewed, and Wound Reviewed.    Review of Systems:  Review of Systems   Constitutional: Negative for chills and fever.   Gastrointestinal: Negative for nausea and vomiting.   Musculoskeletal: Positive for joint pain.   All other systems reviewed and are negative.      Hemodynamics:  Temp (24hrs), Av.6 °C (97.8 °F), Min:36.1 °C (96.9 °F), Max:37.1 °C (98.7 °F)  Temperature: 36.1 °C (96.9 °F)  Pulse  Av.9  Min: 60  Max: 125  Blood Pressure : 121/74       Physical Exam:  Physical Exam   Constitutional: No distress.   HENT:   Mouth/Throat: No oropharyngeal exudate.   Eyes: Conjunctivae are normal. No scleral icterus.   Neck: Neck supple. No JVD present.   Cardiovascular: Normal rate.    No murmur heard.  Pulmonary/Chest: Effort normal. No stridor. No respiratory distress. She has no wheezes.   Abdominal: Soft. There is no rebound and no guarding.   Musculoskeletal: She  exhibits tenderness. She exhibits no edema.   Right hip dressed   Lymphadenopathy:     She has no cervical adenopathy.   Neurological: She is alert.   Skin: Skin is warm. She is not diaphoretic.   Nursing note and vitals reviewed.      Meds:    Current Facility-Administered Medications:   •  potassium chloride SA  •  magnesium oxide  •  vancomycin  •  rivaroxaban  •  fluconazole  •  MD Alert...Vancomycin per Pharmacy  •  cefepime  •  acetaminophen  •  LORazepam  •  tramadol  •  insulin regular **AND** Accu-Chek ACHS **AND** NOTIFY MD and PharmD **AND** glucose **AND** dextrose 10% bolus  •  NS  •  pramipexole  •  lidocaine  •  oxyCODONE immediate-release  •  temazepam  •  morphine injection  •  cyclobenzaprine  •  Respiratory Care per Protocol  •  amLODIPine  •  cinacalcet  •  losartan  •  acetaminophen  •  atorvastatin  •  omeprazole  •  metoprolol  •  senna-docusate **AND** polyethylene glycol/lytes **AND** magnesium hydroxide **AND** bisacodyl  •  ondansetron  •  ondansetron    Labs:  Recent Labs      06/09/19   0057  06/10/19   0011  06/11/19   0055   WBC  5.0  5.3  9.0   RBC  3.81*  3.71*  4.11*   HEMOGLOBIN  10.7*  10.6*  11.4*   HEMATOCRIT  32.5*  32.4*  35.4*   MCV  85.3  87.3  86.1   MCH  28.1  28.6  27.7   RDW  55.8*  56.9*  58.0*   PLATELETCT  214  218  261   MPV  9.6  9.7  9.9   NEUTSPOLYS  59.20  59.80  65.10   LYMPHOCYTES  19.00*  17.70*  17.30*   MONOCYTES  12.80  13.50*  11.90   EOSINOPHILS  7.80*  7.30*  4.00   BASOPHILS  1.00  1.10  0.90     Recent Labs      06/09/19   0057  06/10/19   0011  06/11/19   0055   SODIUM  135  134*  134*   POTASSIUM  3.6  3.1*  4.8   CHLORIDE  103  103  103   CO2  25  24  23   GLUCOSE  79  111*  100*   BUN  7*  6*  9     Recent Labs      06/09/19   0057  06/10/19   0011  06/11/19   0055   CREATININE  0.38*  0.34*  0.49*       Imaging:  Ct-pelvis With    Result Date: 5/28/2019 5/28/2019 12:54 PM HISTORY/REASON FOR EXAM: Follow-up abscess TECHNIQUE/EXAM DESCRIPTION AND  NUMBER OF VIEWS: CT scan of the pelvis with contrast. Contrast-enhanced helical scanning of the pelvis was obtained from the iliac crests through the pubic symphysis following the bolus administration of 100 mL of Omnipaque 350 nonionic contrast without complication. Low dose optimization technique was utilized for this CT exam including automated exposure control and adjustment of the mA and/or kV according to patient size. COMPARISON: MRI scan of the pelvis 5/20/2019 and CT chest abdomen and pelvis 4/23/2019 FINDINGS: Again redemonstrated is a 3.2 x 2 cm multiloculated low-attenuation collection in the right iliacus muscle unchanged from recent MRI scan of the pelvis. There is also a 2.8 x 1.7 cm low-attenuation collection in the right gluteus medius muscle. This collection is also unchanged from recent MRI scan of the pelvis. Previous fixation screws are noted coursing through the right lateral ilium across the sacrum extending into the left lateral ilium. There is a healing fracture of the right posterior ilium. There is no evidence of free fluid in the pelvis or pelvic mass. There are scattered air-fluid levels throughout the small bowel     1.  Stable abscesses again redemonstrated in the right iliac's muscle and right gluteus medius muscle. 2.  Status post fixation screws coursing through the jose antonio and sacrum for treatment of healing fracture in the right posterior ilium. 3.  Adynamic ileus.    Dx-chest-portable (1 View)    Result Date: 5/21/2019 5/21/2019 2:30 PM HISTORY/REASON FOR EXAM:  Right-sided neck pain. TECHNIQUE/EXAM DESCRIPTION AND NUMBER OF VIEWS: Single portable view of the chest. COMPARISON: 4/30/2019 FINDINGS: Single portable view of the chest demonstrates a normal cardiac silhouette and mediastinal contours. Calcification is in the aortic knob. No discrete opacity, pleural fluid, or pneumothorax. A right PICC line remains in place. The tip appears to be located at the cavoatrial junction.      1.  No acute cardiopulmonary findings. 2.  Right PICC line place with the tip projecting over the cavoatrial junction    Dx-chest-portable (1 View)    Result Date: 4/30/2019 4/30/2019 4:19 PM HISTORY/REASON FOR EXAM:  PICC line placement. IV access necessity. TECHNIQUE/EXAM DESCRIPTION AND NUMBER OF VIEWS: Single portable view of the chest. COMPARISON:  4/22/2019 FINDINGS: A right arm PICC line is present in satisfactory position with its tip projecting over the SVC at the level of the jennifer. The cardiac silhouette is within normal limits. No infiltrates or consolidations are noted. No pleural effusion is noted.     1.  Right arm PICC line tip projects in satisfactory position. 2.  Clear chest.    Mr-brain-with & W/o    Result Date: 5/21/2019 5/20/2019 3:37 PM HISTORY/REASON FOR EXAM:  Follow-up brain abscesses. TECHNIQUE/EXAM DESCRIPTION:   MRI of the brain without and with contrast. T1 sagittal, T2 fast spin-echo axial, T1 coronal, FLAIR coronal, diffusion-weighted and apparent diffusion coefficient (ADC map) axial images were obtained of the whole brain. T1 postcontrast axial and T1 postcontrast coronal images were obtained. The study was performed on a Attero Signa 1.5 Miranda MRI scanner. 6 mL Gadavist contrast was administered intravenously. COMPARISON:  MRI scan of the brain 4/24/2019 FINDINGS: There are innumerable small ovoid enhancing foci throughout the brain parenchyma, both in the supratentorial and infratentorial compartments. The number of these lesions has increased significantly since previous exam. The previously identified largest lesion in the right brachium pontis has resolved. There is evidence of increased T2 signal intensity in many of these lesions and there is surrounding increased T2 signal intensity in several lesions in the right superior frontal lobe. The calvariae are unremarkable. There are no extra-axial fluid collections. The ventricular system and basal cisterns are mild to  moderately prominent. There is mild-to-moderate prominence of the cortical sulci and gyri. There are no mass effects or shift of midline structures. There are no hemorrhagic lesions. The diffusion-weighted axial images show no evidence of acute cerebral infarction. The brainstem and posterior fossa structures are unremarkable. Vascular flow voids in the vertebrobasilar and carotid arteries, [place], and dural venous sinuses are intact. The paranasal sinuses and mastoids in the field of view are unremarkable.     1.  Increase in number of innumerable small ovoid enhancing lesions throughout the brain parenchyma. These lesions may represent microabscesses of either bacterial or fungal origin or possibly brain metastases. 2.  Age-related cerebral atrophy.    Mr-pelvis-with & W/o And Sequences    Result Date: 5/21/2019 5/20/2019 3:37 PM HISTORY/REASON FOR EXAM:  Abd pain, fever, abscess suspected; evaluate abscesses TECHNIQUE/EXAM DESCRIPTION: MRI of the pelvis without and with contrast. The study was performed on a ShelfFlip Signa 1.5 Miranda MRI scanner. T1 axial, T1 coronal, T2 fast spin-echo fat-suppressed axial, and T2 fast spin-echo fat-suppressed coronal images were obtained of the pelvis. Postcontrast fat-suppressed intravenous gadolinium enhanced sequences in the axial and coronal planes were then  performed. 6 mL Gadavist contrast was administered intravenously. COMPARISON: 3/26/2019. CT 4/23/2019 FINDINGS: Interval decrease in size of the collection in the right gluteal medius muscle, measuring 1.4 x 1.9 cm, previously 2.3 x 2.8 cm. There is minimal surrounding stranding. Mild heterogeneity and stranding in the overlying right gluteus cornelio muscle without discrete collection. Grossly unchanged in size of the multiloculated collection in the right iliacus muscle, measuring 2.4 x 3.5 cm. Postsurgical change in the sacrum with susceptibility artifact from the screws. Moderate osteoarthritis of the bilateral  hip joints.     1. Interval decrease in size of the collection in the right gluteal medius muscle, measuring 1.4 x 1.9 cm, previously 2.3 x 2.8 cm. There is minimal surrounding stranding. Mild heterogeneity and stranding in the overlying right gluteus cornelio muscle without discrete collection, could relate to reactive change or phlegmon. 2. Grossly unchanged in size of the multiloculated collection in the right iliacus muscle, measuring 2.4 x 3.5 cm.    Us-chest    Result Date: 2019 10:23 AM HISTORY/REASON FOR EXAM:  Right lateral breast pain, evaluate implant TECHNIQUE/EXAM DESCRIPTION AND NUMBER OF VIEWS: Targeted ultrasound of the right breast. COMPARISON: None FINDINGS: No gross mass or implant rupture are identified. Diagnostic assessment is not feasible lack of proper equipment tube performed breast imaging     1.  Nondiagnostic assessment of the right breast without gross implant rupture identified 2.  Recommend further assessment with mammography and ultrasound at an accredited breast facility    Ec-halie W/o Cont    Result Date: 2019  Transesophageal Echo Report Echocardiography Laboratory CONCLUSIONS Normal esophageal echocardiogram. No evidence of endocarditis. No significant change since the prior study of 3/15/2019. MARLENA CLIFFORD Exam Date:          2019                     12:48 Exam Location:      Inpatient Priority:            Routine Ordering Physician:        DARON WHELAN Referring Physician: Sonographer:               CATALINO Rivers Age:    68     Gender:    F MRN:    [id number] :    [id number] BSA:           Ht (in):           Wt (lb): Report Type:      Complete Indications:     Endocarditis and heart valve disorders in diseases                  classified elsewhere ICD Codes:       I39 CPT Codes:       13261 BP:          /          HR: Technical Quality:       Fair MEASUREMENTS  (Male / Female)  Normal Values 2D ECHO Estimated LV Ejection Fraction    65 %                  * Indicates values subject to auto-interpretation LV EF:  65    % Medications Medications determined by anesthesiologist. Limitations none Complications none Proc. Components The probe was inserted and manipulated by Dr Potter. Probe #SM  was used for this procedure. 2D, color Doppler, spectral Doppler, and 3D imaging were used as part of the evaluation as clinically indicated. FINDINGS Left Ventricle The left ventricle was normal in size and function. Right Ventricle The right ventricle was normal in size and function. Right Atrium The right atrium is normal in size. Left Atrium The left atrium is normal in size. LA Appendage Normal left atrial appendage. IA Septum The interatrial septum is normal. IV Septum The interventricular septum is normal. Mitral Valve Structurally normal mitral valve without significant stenosis or regurgitation.  No valvular vegetations. Aortic Valve Structurally normal aortic valve without significant stenosis or regurgitation.  No valvular vegetations. Tricuspid Valve Structurally normal tricuspid valve without significant stenosis or regurgitation.  No valvular vegetations. Pulmonic Valve Structurally normal pulmonic valve without significant stenosis or regurgitation.  No valvular vegetations. Pericardium Normal pericardium without effusion. Aorta The aortic root is normal. Christopher Potter MD (Electronically Signed) Final Date:     24 May 2019 15:42      Micro:  Results     Procedure Component Value Units Date/Time    URINALYSIS [[id number]]  (Abnormal) Collected:  06/10/19 1010    Order Status:  Completed Specimen:  Urine from Urine, Clean Catch Updated:  06/10/19 1053     Color Yellow     Character Clear     Specific Gravity 1.009     Ph 7.0     Glucose Negative mg/dL      Ketones Negative mg/dL      Protein Negative mg/dL      Bilirubin Negative     Urobilinogen, Urine 0.2     Nitrite Negative      "Leukocyte Esterase Trace (A)     Occult Blood Negative     Micro Urine Req Microscopic    Narrative:       Collected By:[id number] SANAM MICHAELS    BLOOD CULTURE [[id number]] Collected:  06/04/19 1512    Order Status:  Completed Specimen:  Blood from Peripheral Updated:  06/09/19 1700     Significant Indicator NEG     Source BLD     Site PERIPHERAL     Culture Result No growth after 5 days of incubation.    Narrative:       Per Hospital Policy: Only change Specimen Src: to \"Line\" if  specified by physician order.  Left Hand    BLOOD CULTURE [[id number]] Collected:  06/04/19 1512    Order Status:  Completed Specimen:  Blood from Peripheral Updated:  06/09/19 1700     Significant Indicator NEG     Source BLD     Site PERIPHERAL     Culture Result No growth after 5 days of incubation.    Narrative:       Per Hospital Policy: Only change Specimen Src: to \"Line\" if  specified by physician order.  Left Forearm/Arm    Cryptococcal Antigen [[id number]]     Order Status:  Sent Specimen:  Blood     Cryptococcal Antigen [[id number]]     Order Status:  Canceled Specimen:  CSF     Cryptococcal Antigen [[id number]]     Order Status:  Sent Specimen:  Blood     Cryptococcal Antigen Titer [[id number]]     Order Status:  Canceled Specimen:  Blood from Blood     Cryptococcal Antigen [[id number]]     Order Status:  Canceled Specimen:  Blood     C Diff by PCR rflx Toxin [[id number]] Collected:  06/06/19 0900    Order Status:  Completed Specimen:  Stool from Stool Updated:  06/06/19 1541     C Diff by PCR Negative     Comment: C. difficile NOT detected by PCR.  Treatment not indicated per guidelines.  Repeat testing not indicated within 7 days.          027-NAP1-BI Presumptive Negative     Comment: Presumptive 027/NAP1/BI target DNA sequences are NOT DETECTED.       Narrative:       Special Contact [id number] MERYL PICKENS  Does this patient have risk factors for C-diff?->Yes    Culture Respiratory W/ GRM STN [[id number]]     Order Status: " " Completed Specimen:  Respirate from Sputum     URINALYSIS [[id number]]     Order Status:  No result Specimen:  Urine           Assessment:  Gluteal and iliopsoas abscess  Brain abscesses vs mets  Breast cancer  DM    Plan:  FUO  Fever and chills 6/4  Remains afebrile  Panculture neg  1, 3 beta glucan neg  QuantiGold+. 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 Decrease in the size of the lesions. There are also interval reduction in the extent of the surrounding white matter edema in the restricted diffusion consistent with improvement/treatment response of the most of the multifocal brain abscesses.  MRI 5/21 showed innumerable microabscesses vs mets  MRI on 6/7 with interval progression of supra and infratentorial intra--axial nodules and associated edema.  New right thalamus lesion. Radiology favors infection over neoplasm though both remain considerations (had been off abx)  Neurosurgical evaluation - recommended continuing antibiotics for another 2 weeks and then repeating MRI brain, per neurosurgery the lesions are not amenable to biopsy   Mult blood cultures neg  CHAS neg 3/15 and 5/24  +QuantiGold as above  In view of initial improvement on vanco/cefepime and deterioration after discontinuation-resumed both abx  Plan for repeat MRI around 6/21/2019 to assess for improvement  Monitor renal function while on vanco     Type 2 DM  Hemoglobin A1c 6.3   Keep BS under 150 to help control current infection  BS 85-96      Gluteal and iliopsoas abscess s/p treatment.   Adjacent to hardware in right hip (placed 12/17/18)  CT 4/23 \"Fluid collections within the right gluteal and iliacis muscles.. The collection within the right gluteal muscle has unchanged while the fluid collection within the right iliacis muscle is " "increased somewhat in size.\" 2.7 cm  Cultures 3/29 and 4/4 neg  MRI on 5/20/2019 - interval decrease in collection in R gluteal muscle and persistent multiloculated collection in R iliacus muscle.   CT 5/28 no change  s/p drainage on 5/30/2019-cultures negative  S/p removal hardware 6/5-no cultures done    I have performed a physical exam and reviewed and updated ROS as of today.  In review of yesterday's note dated  6/10/2019, there are no changes except as documented above.        "

## 2019-06-11 NOTE — THERAPY
"Physical Therapy Evaluation completed.   Bed Mobility:  Supine to Sit: Minimal Assist  Transfers: Sit to Stand: Minimal Assist  Gait: Level Of Assist: Minimal Assist with Front-Wheel Walker       Plan of Care: Will benefit from Physical Therapy 3 times per week  Discharge Recommendations: Equipment: Will Continue to Assess for Equipment Needs. Post-acute therapy Discharge to a transitional care facility for continued skilled therapy services.    See \"Rehab Therapy-Acute\" Patient Summary Report for complete documentation.       Pt is a 69 y/o female presenting to acute setting for hardware removal and I&D of abscess her sacral/hip region. Pt is WBAT but has not mobilized since this admission began on 5/29 until yesterday with NRSG. Pt limited by reports of pain but demonstrates good weight bearing and weight shifting at the LLE. Constant verbal encouragement and cuing required to get Pt to participate in activity. Pt was admitted from LTAC but plans to go to SNF at [place]. Recommend post acute placement for continued therapy intervention, and will continue to work with Pt in acute setting to increase functional mobility and strength.  "

## 2019-06-11 NOTE — THERAPY
"Occupational Therapy Evaluation completed.   Functional Status: Supine > EOB min A, LB dressing total A, stand-pivot with FWW min A, self-feeding with set-up  Plan of Care: Will benefit from Occupational Therapy 3 times per week  Discharge Recommendations:  Equipment: Will Continue to Assess for Equipment Needs. Post-acute therapy: Discharge to a transitional care facility for continued skilled therapy services.    See \"Rehab Therapy-Acute\" Patient Summary Report for complete documentation.    68 y.o. female with h/o pelvic ring injury s/p sacroiliac screws (12/2018). Pt has transferred between multiple facilities since then (acute, LTAC, SNFs). Re-admitted now for hardware removal with orthopedics. Pt's brain MRI shows increased brain lesions. Pt seen for OT eval. Pt mobilized to bedside chair using FWW with good WB to LLLE. Dep to don socks due to limited LE AROM. Provided waffle cushion and educated pt on breath control to facilitate pain control. Pt set-up to self-feed breakfast. Pt is limited by: generalized weakness, balance impairment, pain. She requires encouragement to participate, but actually moves quite well. Pt would benefit from acute OT to train on compensatory LB ADL, progress safe transfers and standing activity, assist with DC planning and DME needs. Will follow.     "

## 2019-06-11 NOTE — CARE PLAN
Problem: Mobility  Goal: Risk for activity intolerance will decrease  Outcome: PROGRESSING SLOWER THAN EXPECTED  Patient encouraged to get up to commode. Patient refuses and then is educated on importance. Patient agrees and then wants to go back to bed as soon as she is up. Patient educated again on importance. Patient verbalizes understanding but continues to desire to go to bed. Will continue to encourage activity while in hospital.

## 2019-06-11 NOTE — PROGRESS NOTES
"   Orthopaedic Progress Note    Interval changes:  Patient doing well   No interval change  Dressing CDI  Cleared for DC to SNF by ortho pending medicine and ID team clearance     ROS - Patient denies any new issues.  Pain well controlled.    BP (!) 178/110   Pulse (!) 103   Temp 36.7 °C (98.1 °F) (Temporal)   Resp 18   Ht 1.575 m (5' 2\")   Wt 55.4 kg (122 lb 2.2 oz)   SpO2 96%       Patient seen and examined  No acute distress  Breathing non labored  RRR  Right hip surgical dressing is clean, dry, and intact. Patient clearly fires tibialis anterior, EHL, and gastrocnemius/soleus. Sensation is intact to light touch throughout superficial peroneal, deep peroneal, tibial, saphenous, and sural nerve distributions. Strong and palpable 2+ dorsalis pedis and posterior tibial pulses with capillary refill less than 2 seconds. No lower leg tenderness or discomfort.        Recent Labs      06/08/19   0055  06/09/19   0057  06/10/19   0011   WBC  4.9  5.0  5.3   RBC  3.74*  3.81*  3.71*   HEMOGLOBIN  10.8*  10.7*  10.6*   HEMATOCRIT  32.6*  32.5*  32.4*   MCV  87.2  85.3  87.3   MCH  28.9  28.1  28.6   MCHC  33.1*  32.9*  32.7*   RDW  57.2*  55.8*  56.9*   PLATELETCT  206  214  218   MPV  10.3  9.6  9.7       Active Hospital Problems    Diagnosis   • Sepsis (HCC) [A41.9]     Priority: High   • Abscess of right iliac muscle and right gluteus medius muscle [L02.91]     Priority: High   • Hypomagnesemia [E83.42]     Priority: Medium   • Chronic pain [G89.29]     Priority: Medium   • RLS (restless legs syndrome) [G25.81]     Priority: Medium   • Hypercalcemia [E83.52]     Priority: Low   • Essential hypertension [I10]     Priority: Low   • Chronic hyponatremia [E87.1]     Priority: Low   • History of breast cancer [Z85.3]     Priority: Low   • COPD (chronic obstructive pulmonary disease) (HCC) [J44.9]     Priority: Low   • History of DVT (deep vein thrombosis) [Z86.718]       Assessment/Plan:  Cleared for DC by ortho pending " medicine and ID team clearance  POD#5 S/P Hardware removal, pelvis  Wt bearing status - WBAT  Wound care/Drains - dressing changes every other day by nursing  Future Procedures - none planned   Lovenox: Start 6/6, Duration-until ambulatory > 150'  Sutures/Staples out- 10-14 days post operatively  PT/OT-initiated  Antibiotics: maxipime 2g IV BID, vanco 600mg IV QD  DVT Prophylaxis- TEDS/SCDs/Foot pumps  Sue-none  Case Coordination for Discharge Planning - Disposition pending abx needs

## 2019-06-11 NOTE — CARE PLAN
Problem: Safety  Goal: Will remain free from injury  Outcome: PROGRESSING AS EXPECTED  Bed locked and in lowest position. Bed alarm on. Treaded socks in use. Call and belongings within reach. Patient educated to call for assistance. Patient verbalizes understanding. Hourly rounding in place.      Problem: Knowledge Deficit  Goal: Knowledge of disease process/condition, treatment plan, diagnostic tests, and medications will improve  Outcome: PROGRESSING AS EXPECTED  Discussed plan of care and medications with patient, patient verbalizes understanding.

## 2019-06-11 NOTE — PROGRESS NOTES
2 RN skin check with RN Michael  Heels red, boggy, and blanching. Floated on pillows  Right hip incision site with dressing in place. CDI  Buttocks red and blanching.   Skin otherwise intact.

## 2019-06-11 NOTE — PROGRESS NOTES
Hospital Medicine Daily Progress Note    Date of Service  6/10/2019    Chief Complaint  68 y.o. female admitted 5/29/2019 with persistent abscess.    Hospital Course     Ms. Martini is a 68-year-old female who was transferred from [facility] on 5/29/2019 with persistent right gluteal abscesses since sacral fracture repair in December.  She has had multiple IR drainage.  She initially presented with pelvic pain, low back pain, and confusion. She also had new slurred speech. Imaging of the abdomen, pelvis and brain revealed abscesses and she was treated with a full course of IV antibiotics per ID.  Her slurred speech improved and she was able to ambulate with physical therapy. However, repeat imaging showed persistent pelvic abscesses.  MRI brain shows increase in brain lesions.  Her brain lesions are known from prior imaging and there is concern they represent metastasis.  Oncology aware and do not recommend further imaging. Cultures remained negative and a CHAS done by Dr. Potter showed no vegetations. The size of the pelvic abscesses was reviewed by interventional radiology and the case was discussed with Dr. Finley who recommended transfer to [facility] for CT guided drainage.  S/P drainage on 5/30. On 6/4, febrile again, so restarted cefepime and vancomycin per ID and has been on them since.  Right hip hardware removal 6/5. MRI brain repeat 6/7/19 saw progression of the supra and infratentorial intra-axial nodules with edema, prompting a Neurosurgery consult.  Dr. Nguyen stated lesions not amendable to bx stereotactically, favors infectious, recs abx, antineuroleptic and repeat brain MRI in 2 weeks (around 6/21).  CT pelvis 6/9 with residual fluid collection 2.5 x 1.8 cm in right iliac m., slightly smaller than before.  Cefepime + vanc continue. Now broaden work up to include fungal and empiric started fluconazole 6/9.        Interval Problem Update  No more diarrhea.  Instead, constipated.  No event over night.  K 3.1,  replaced.    Consultants/Specialty  Infectious disease  Orthopedic surgery  Neurosurgery, Dr. Nguyen    Code Status  DNR/DNI    Disposition  Anticipate SNF need at discharge.  Transfer to Med/Onc.    PT and OT to eval for SNF need    Review of Systems  Review of Systems   Constitutional: Negative for fever.   HENT: Negative for ear pain.    Eyes: Negative for blurred vision.   Respiratory: Negative for shortness of breath.    Cardiovascular: Negative for chest pain.   Gastrointestinal: Negative for abdominal pain, blood in stool, constipation, diarrhea, melena and vomiting.   Genitourinary: Negative for dysuria, hematuria and urgency.   Musculoskeletal: Negative for myalgias.   Skin: Negative for rash.   Neurological: Negative for dizziness and headaches.   All other systems reviewed and are negative.       Physical Exam  Temp:  [36.4 °C (97.5 °F)-36.7 °C (98.1 °F)] 36.7 °C (98.1 °F)  Pulse:  [] 103  Resp:  [16-18] 18  BP: (125-178)/() 178/110  SpO2:  [93 %-96 %] 96 %    I performed the physical exam today, 06/10/19, and compared to yesterday, 06/09/2019, no changes except for that noted below:  Physical Exam   Constitutional: She is oriented to person, place, and time. She appears well-developed and well-nourished. No distress.   HENT:   Head: Normocephalic and atraumatic.   Eyes: Conjunctivae are normal. No scleral icterus.   Neck: Neck supple.   Cardiovascular: Normal rate, regular rhythm and normal heart sounds.  Exam reveals no gallop and no friction rub.    No murmur heard.  Pulmonary/Chest: Effort normal and breath sounds normal. No respiratory distress. She has no wheezes. She has no rales.   Abdominal: Soft. Bowel sounds are normal. She exhibits no distension and no mass. There is no tenderness. There is no rebound and no guarding.   Musculoskeletal: She exhibits tenderness. She exhibits no edema.   Mild tenderness right hip   Neurological: She is alert and oriented to person, place, and time.    Skin: Skin is warm and dry. She is not diaphoretic.   Psychiatric: She has a normal mood and affect. Her behavior is normal. Thought content normal.   Nursing note and vitals reviewed.      Fluids    Intake/Output Summary (Last 24 hours) at 06/10/19 1737  Last data filed at 06/10/19 1242   Gross per 24 hour   Intake             2015 ml   Output              900 ml   Net             1115 ml       Laboratory  Recent Labs      06/08/19   0055  06/09/19   0057  06/10/19   0011   WBC  4.9  5.0  5.3   RBC  3.74*  3.81*  3.71*   HEMOGLOBIN  10.8*  10.7*  10.6*   HEMATOCRIT  32.6*  32.5*  32.4*   MCV  87.2  85.3  87.3   MCH  28.9  28.1  28.6   MCHC  33.1*  32.9*  32.7*   RDW  57.2*  55.8*  56.9*   PLATELETCT  206  214  218   MPV  10.3  9.6  9.7     Recent Labs      06/08/19   0055  06/09/19   0057  06/10/19   0011   SODIUM  135  135  134*   POTASSIUM  3.5*  3.6  3.1*   CHLORIDE  102  103  103   CO2  23  25  24   GLUCOSE  92  79  111*   BUN  10  7*  6*   CREATININE  0.40*  0.38*  0.34*   CALCIUM  7.3*  7.4*  7.5*                   Imaging  CT-PELVIS WITH   Final Result      1.  Residual or recurrent abscess within the right iliacus muscle measuring 2.5 x 1.8 cm. It slightly smaller than on 5/28/2019      2.  Interval removal of hardware from the iliac bones and sacrum      3.  Lytic change of the right iliac bone and the sacrum. This could be related to hardware versus osteomyelitis.      4.  Subacute fractures of the right ilium, sacrum, and there is lucency within the left iliac bone that is likely from hardware      MR-BRAIN-WITH & W/O   Final Result      1.  Interval progression of supra and infratentorial intra-axial nodules and associated edema. This short-term interval progression favors infection over neoplasm though both remain considerations.   2.  Mild atrophy      DX-PORTABLE FLUOROSCOPY < 1 HOUR   Final Result         Portable fluoroscopy utilized for 2 minutes.      DX-PELVIS-1 OR 2 VIEWS   Final Result       Status post hardware removal from the right SI joint      DX-CHEST-PORTABLE (1 VIEW)   Final Result      Interstitial prominence could be due to pulmonary edema or hypoventilatory change.      DX-CHEST-LIMITED (1 VIEW)   Final Result      LEFT basilar underinflation atelectasis or pneumonia      CT-DRAIN-HEMATOMA - SEROMA   Final Result      CT-guided RIGHT pelvic fluid collection aspiration, laboratory evaluation pending              Assessment/Plan    Diarrhea:  Now constipated.  Ruled out for C. difficile.  Supportive care.  IV fluid.  Imodium as needed.  Bowel regimen.    Brain lesions: not amenable to biopsy and is favor infectious etiology per neurosurgery.  Also recommend to continue antibiotics and repeat brain MRI in 2 weeks (~6/21).  Dr. Junior sent labs for fungal work up, some labs are sent out (cryptococcal ag).  Fluconazole 800mg daily started 6/9.      * Abscess of right iliac muscle and right gluteus medius muscle- (present on admission)   Assessment & Plan    Recurrent issue since sacral fx repair in December 2018. Has had multiple IR drainage and completed abx.  CT guided abscess drainage on 5/30; 2 mL removed and cultured but negative and therefore abx stopped.  Then 4 days later became febrile, so cefepime and vamc started.  Been on them since.  Right hip hardware removal on 6/5.      Review both images and report of CT pelvis today with residual 2.5 x 1.8 cm fluid collection in the right iliac muscle, improved compared to prior.       Sepsis (HCC)   Assessment & Plan    See above for source.  Resolved.     Hypomagnesemia- (present on admission)   Assessment & Plan    Improved.  Monitor intermittently      RLS (restless legs syndrome)- (present on admission)   Assessment & Plan    Restarted home dose of pramipexole.  Tolerating well     Chronic pain- (present on admission)   Assessment & Plan    Start acetaminophen 1000 mg p.o. twice daily.  Expect to help with PT OT treatments..  Continue  oxycodone for breakthrough pain as needed.     History of DVT (deep vein thrombosis)- (present on admission)   Assessment & Plan    Restart Xarelto 15mg daily (held from 6/3-6/9 for right hip hardware removal) as cleared by Ortho as 5 days outside of procedure.     Hypercalcemia- (present on admission)   Assessment & Plan    Chronic. Continue sensipar.      Essential hypertension- (present on admission)   Assessment & Plan    Continues to be normotensive   - Continue home losartan, metoprolol, & amlodipine.      Chronic hyponatremia- (present on admission)   Assessment & Plan    Stable.  No neurological change.  Continue to monitor.  1/2 normal saline.     History of breast cancer- (present on admission)   Assessment & Plan    S/p left mastectomy and breast implant  -Outpatient follow-up     COPD (chronic obstructive pulmonary disease) (HCC)- (present on admission)   Assessment & Plan    Stable. Doing well on room air. No respiratory distress/SOB. No evidence of exacerbation but will continue to monitor.         Hypokalemia: Order 40 mEq of K-Dur x1.    VTE prophylaxis: Fully anticoagulated with Xarelto

## 2019-06-11 NOTE — PROGRESS NOTES
"Pharmacy Kinetics 68 y.o. female on vancomycin day # 8 6/11/2019    Currently on Vancomycin 600 mg iv q12hr (00, 12)      Indication for Treatment: Brain abscess, iliopsoas abscess      Pertinent history per medical record: Admitted on 5/29/2019 for pelvic abscess. Patient was recently admitted to [facility] from 3/8-4/30 for fever. She was found to have possible brain abscess as well as iliopsoas abscess. ID was consulted during that admission and she was treated with meropenem and vancomycin, with plans for 6 weeks of antibiotics. She eventually transferred to LT[facility] for completion of antibiotics. CT scan on 5/28 again demonstrated pelvic abscess, prompting return to [facility] for I&D, which was performed with IR on 5/30. She had removal of possibly infected hardware on 6/5. MRI showed increase in the number of innumerable small ovoid enhancing lesions throughout the brain parenchyma (possible brain abscess vs metastatic lesions). ID is following.      Other antibiotics: cefepime 2 g IV q12h     Allergies: Patient has no known allergies.      List concerns for renal function: age      Pertinent cultures to date:   06/04/19: C.Diff: Negative   06/04/19: PBCx2: NGTD   05/30/19: Pelvic abscess,BF: NGTD      MRSA nares swab if pneumonia is a concern (ordered/positive/negative/n-a): NA    Recent Labs      06/09/19   0057  06/10/19   0011  06/11/19   0055   WBC  5.0  5.3  9.0   NEUTSPOLYS  59.20  59.80  65.10     Recent Labs      06/09/19   0057  06/10/19   0011  06/11/19   0055   BUN  7*  6*  9   CREATININE  0.38*  0.34*  0.49*     Recent Labs      06/08/19   1655  06/10/19   1122   VANCOTROUGH  11.3  21.6*     Intake/Output Summary (Last 24 hours) at 06/11/19 0900  Last data filed at 06/10/19 1242   Gross per 24 hour   Intake                0 ml   Output              900 ml   Net             -900 ml      /74   Pulse 78   Temp 36.1 °C (96.9 °F) (Temporal)   Resp 18   Ht 1.575 m (5' 2\")   Wt 54.2 kg (119 lb 7.8 oz)   " SpO2 97%  Temp (24hrs), Av.6 °C (97.8 °F), Min:36.1 °C (96.9 °F), Max:37.1 °C (98.7 °F)      A/P   1. Vancomycin dose change: none  2. Next vancomycin level:  at 1100; ordered   3. Goal trough: ~18mcg/mL (brain abscess)  4. Comments: Increase in SCr overnight, will monitor.  No new culture results. Trough in AM.   Plan for repeat MRI ~ per ID to assess abscess progression.      Erwin Christiansen, BariD, BCPS

## 2019-06-12 PROBLEM — E46 MALNUTRITION (HCC): Status: ACTIVE | Noted: 2019-06-12

## 2019-06-12 LAB
ANION GAP SERPL CALC-SCNC: 9 MMOL/L (ref 0–11.9)
BASOPHILS # BLD AUTO: 1.2 % (ref 0–1.8)
BASOPHILS # BLD: 0.09 K/UL (ref 0–0.12)
BUN SERPL-MCNC: 9 MG/DL (ref 8–22)
CALCIUM SERPL-MCNC: 8.9 MG/DL (ref 8.5–10.5)
CHLORIDE SERPL-SCNC: 100 MMOL/L (ref 96–112)
CO2 SERPL-SCNC: 24 MMOL/L (ref 20–33)
CREAT SERPL-MCNC: 0.37 MG/DL (ref 0.5–1.4)
EOSINOPHIL # BLD AUTO: 0.48 K/UL (ref 0–0.51)
EOSINOPHIL NFR BLD: 6.6 % (ref 0–6.9)
ERYTHROCYTE [DISTWIDTH] IN BLOOD BY AUTOMATED COUNT: 59.9 FL (ref 35.9–50)
GLUCOSE BLD-MCNC: 83 MG/DL (ref 65–99)
GLUCOSE BLD-MCNC: 90 MG/DL (ref 65–99)
GLUCOSE BLD-MCNC: 95 MG/DL (ref 65–99)
GLUCOSE BLD-MCNC: 96 MG/DL (ref 65–99)
GLUCOSE SERPL-MCNC: 93 MG/DL (ref 65–99)
HCT VFR BLD AUTO: 37.2 % (ref 37–47)
HGB BLD-MCNC: 11.8 G/DL (ref 12–16)
IMM GRANULOCYTES # BLD AUTO: 0.04 K/UL (ref 0–0.11)
IMM GRANULOCYTES NFR BLD AUTO: 0.5 % (ref 0–0.9)
LYMPHOCYTES # BLD AUTO: 1.08 K/UL (ref 1–4.8)
LYMPHOCYTES NFR BLD: 14.8 % (ref 22–41)
MCH RBC QN AUTO: 27.9 PG (ref 27–33)
MCHC RBC AUTO-ENTMCNC: 31.7 G/DL (ref 33.6–35)
MCV RBC AUTO: 87.9 FL (ref 81.4–97.8)
MONOCYTES # BLD AUTO: 0.91 K/UL (ref 0–0.85)
MONOCYTES NFR BLD AUTO: 12.5 % (ref 0–13.4)
NEUTROPHILS # BLD AUTO: 4.69 K/UL (ref 2–7.15)
NEUTROPHILS NFR BLD: 64.4 % (ref 44–72)
NRBC # BLD AUTO: 0 K/UL
NRBC BLD-RTO: 0 /100 WBC
PLATELET # BLD AUTO: 307 K/UL (ref 164–446)
PMV BLD AUTO: 9.2 FL (ref 9–12.9)
POTASSIUM SERPL-SCNC: 4.1 MMOL/L (ref 3.6–5.5)
RBC # BLD AUTO: 4.23 M/UL (ref 4.2–5.4)
SODIUM SERPL-SCNC: 133 MMOL/L (ref 135–145)
VANCOMYCIN TROUGH SERPL-MCNC: 47.1 UG/ML (ref 10–20)
WBC # BLD AUTO: 7.3 K/UL (ref 4.8–10.8)

## 2019-06-12 PROCEDURE — 85025 COMPLETE CBC W/AUTO DIFF WBC: CPT

## 2019-06-12 PROCEDURE — 700102 HCHG RX REV CODE 250 W/ 637 OVERRIDE(OP): Performed by: INTERNAL MEDICINE

## 2019-06-12 PROCEDURE — 82962 GLUCOSE BLOOD TEST: CPT

## 2019-06-12 PROCEDURE — 99233 SBSQ HOSP IP/OBS HIGH 50: CPT | Performed by: FAMILY MEDICINE

## 2019-06-12 PROCEDURE — 700105 HCHG RX REV CODE 258: Performed by: INTERNAL MEDICINE

## 2019-06-12 PROCEDURE — 36415 COLL VENOUS BLD VENIPUNCTURE: CPT

## 2019-06-12 PROCEDURE — A9270 NON-COVERED ITEM OR SERVICE: HCPCS | Performed by: INTERNAL MEDICINE

## 2019-06-12 PROCEDURE — 700111 HCHG RX REV CODE 636 W/ 250 OVERRIDE (IP): Performed by: INTERNAL MEDICINE

## 2019-06-12 PROCEDURE — 700101 HCHG RX REV CODE 250: Performed by: NURSE PRACTITIONER

## 2019-06-12 PROCEDURE — 770006 HCHG ROOM/CARE - MED/SURG/GYN SEMI*

## 2019-06-12 PROCEDURE — A9270 NON-COVERED ITEM OR SERVICE: HCPCS | Performed by: FAMILY MEDICINE

## 2019-06-12 PROCEDURE — 99232 SBSQ HOSP IP/OBS MODERATE 35: CPT | Performed by: INTERNAL MEDICINE

## 2019-06-12 PROCEDURE — 700102 HCHG RX REV CODE 250 W/ 637 OVERRIDE(OP): Performed by: NURSE PRACTITIONER

## 2019-06-12 PROCEDURE — 700102 HCHG RX REV CODE 250 W/ 637 OVERRIDE(OP): Performed by: FAMILY MEDICINE

## 2019-06-12 PROCEDURE — 700102 HCHG RX REV CODE 250 W/ 637 OVERRIDE(OP): Performed by: HOSPITALIST

## 2019-06-12 PROCEDURE — A9270 NON-COVERED ITEM OR SERVICE: HCPCS | Performed by: HOSPITALIST

## 2019-06-12 PROCEDURE — 80048 BASIC METABOLIC PNL TOTAL CA: CPT

## 2019-06-12 PROCEDURE — 80202 ASSAY OF VANCOMYCIN: CPT

## 2019-06-12 PROCEDURE — A9270 NON-COVERED ITEM OR SERVICE: HCPCS | Performed by: NURSE PRACTITIONER

## 2019-06-12 RX ADMIN — CEFEPIME 2 G: 2 INJECTION, POWDER, FOR SOLUTION INTRAVENOUS at 06:36

## 2019-06-12 RX ADMIN — ATORVASTATIN CALCIUM 10 MG: 10 TABLET, FILM COATED ORAL at 18:18

## 2019-06-12 RX ADMIN — VANCOMYCIN HYDROCHLORIDE 600 MG: 100 INJECTION, POWDER, LYOPHILIZED, FOR SOLUTION INTRAVENOUS at 13:04

## 2019-06-12 RX ADMIN — ACETAMINOPHEN 1000 MG: 500 TABLET ORAL at 06:05

## 2019-06-12 RX ADMIN — METOPROLOL TARTRATE 25 MG: 25 TABLET, FILM COATED ORAL at 18:18

## 2019-06-12 RX ADMIN — MAGNESIUM OXIDE TAB 400 MG (241.3 MG ELEMENTAL MG) 400 MG: 400 (241.3 MG) TAB at 06:10

## 2019-06-12 RX ADMIN — ACETAMINOPHEN 1000 MG: 500 TABLET ORAL at 18:18

## 2019-06-12 RX ADMIN — SENNOSIDES AND DOCUSATE SODIUM 2 TABLET: 8.6; 5 TABLET ORAL at 06:07

## 2019-06-12 RX ADMIN — PRAMIPEXOLE DIHYDROCHLORIDE 1 MG: 0.5 TABLET ORAL at 18:17

## 2019-06-12 RX ADMIN — CEFEPIME 2 G: 2 INJECTION, POWDER, FOR SOLUTION INTRAVENOUS at 18:18

## 2019-06-12 RX ADMIN — SENNOSIDES AND DOCUSATE SODIUM 2 TABLET: 8.6; 5 TABLET ORAL at 18:18

## 2019-06-12 RX ADMIN — LIDOCAINE 2 PATCH: 50 PATCH CUTANEOUS at 12:28

## 2019-06-12 RX ADMIN — OMEPRAZOLE 20 MG: 20 CAPSULE, DELAYED RELEASE ORAL at 06:05

## 2019-06-12 RX ADMIN — VANCOMYCIN HYDROCHLORIDE 600 MG: 100 INJECTION, POWDER, LYOPHILIZED, FOR SOLUTION INTRAVENOUS at 01:13

## 2019-06-12 RX ADMIN — PRAMIPEXOLE DIHYDROCHLORIDE 1 MG: 0.5 TABLET ORAL at 06:09

## 2019-06-12 RX ADMIN — HYDROMORPHONE HYDROCHLORIDE: 10 INJECTION, SOLUTION INTRAMUSCULAR; INTRAVENOUS; SUBCUTANEOUS at 21:57

## 2019-06-12 RX ADMIN — METOPROLOL TARTRATE 25 MG: 25 TABLET, FILM COATED ORAL at 06:01

## 2019-06-12 RX ADMIN — RIVAROXABAN 20 MG: 20 TABLET, FILM COATED ORAL at 18:18

## 2019-06-12 RX ADMIN — AMLODIPINE BESYLATE 10 MG: 5 TABLET ORAL at 06:05

## 2019-06-12 RX ADMIN — FLUCONAZOLE 800 MG: 200 TABLET ORAL at 06:03

## 2019-06-12 RX ADMIN — PRAMIPEXOLE DIHYDROCHLORIDE 1 MG: 0.5 TABLET ORAL at 12:28

## 2019-06-12 RX ADMIN — LOSARTAN POTASSIUM 50 MG: 50 TABLET ORAL at 06:01

## 2019-06-12 RX ADMIN — CINACALCET HYDROCHLORIDE 60 MG: 30 TABLET, COATED ORAL at 05:59

## 2019-06-12 ASSESSMENT — ENCOUNTER SYMPTOMS
PALPITATIONS: 0
HEADACHES: 0
COUGH: 0
SPUTUM PRODUCTION: 0
TINGLING: 0
PHOTOPHOBIA: 0
FEVER: 0
VOMITING: 0
CHILLS: 0
HEARTBURN: 0
DIAPHORESIS: 0
NAUSEA: 0
BLURRED VISION: 0
DOUBLE VISION: 0
HEMOPTYSIS: 0
TREMORS: 0

## 2019-06-12 NOTE — PROGRESS NOTES
Infectious Disease Progress Note    Author: Karen Garcia M.D. Date & Time of service: 6/12/2019  1:31 PM    Chief Complaint:  Brain abscess, iliopsoas abscess, leukopenia      Interval History:  68 y.o. female admitted 5/29/2019 as a transfer from [facility] since 4/30/2019. + diabetes, SANTOSH of right hip with hardware, and breast cancer who was originally admitted to [facility] on 03/08/2019 for right hip and pelvic pain.  Work-up revealed a right iliopsoas lesion, gluteal abscess, and mult brain abscesses  5/6 Interval 24 hours of Vitals/Labs/Micro,and imaging results reviewed as available. See assessment.   5/10 AF WBC 3 continued c/o constipation denies HA, visual changes, neuro deficits  5/11 AF WBC 8.8 isolation stopped due to resolution neutropenia-sleepy today  5/12 AF no CBC somnolent-no acute events noted  5/13 AF WBC 6.2 c/o pain left hip today, unchanged Worse with movement and improved with ice. Refused PT yesterday   5/14 AF c/o dizzyness whenever she tirns on her side-no new HA or visual changes-still havng hip pain  5/15 AF sleeping at time of rounds-by report ambulating  5/16 AF weightbearing continues to improve-ambulating more.  No new complaints  5/20- still has some pain in the hip but ambulating without any issues. No fevers. In good spirits.  5/22/2019 continues to have hip pain.  No fevers have been noted  5/24 AF no CBC plan for CHAS today. Denies any headaches. States she is ambulating  5/25 afebrile CBC not done today.  Patient CHAS was negative.  She has a poor appetite and is requesting an appetite stimulant.  Ongoing left hip pain.  Plan for CT-guided drainage tomorrow  5/28 afebrile WBC 4.5.  Patient is resting comfortably.  She states that she has right breast pain.  Ultrasound of the breast otherwise unremarkable.  She is awaiting CT-guided drainage which has been delayed as CT scan malfunctioning yesterday.  5/29 afebrile, no CBC.  Patient denies any right hip pain.  Continues to  have left-sided hip pain especially with sitting. She had a repeat CT of the pelvis yesterday that revealed no changes in the abscess.  Per report, abscesses are too small for IR drainage given location and recommendation to transfer the patient to [facility] for CT-guided aspiration of the fluid for culture.  She continues to deny any fevers or chills.   AF c/o hungry and right hip pain Awaiting procedure. Denies SE abx  2019-no fevers.  Complains of pain in the hips.  Underwent aspiration of the pelvic abscess.  The cultures are negative  2019-no fevers.  Continues to complain of significant pain in her hips.  Pain medications are being adjusted.  2019-no fevers.  Continues to remain off the antibiotics.  Awaiting Ortho evaluation  2019 patient febrile up to 103.  Having shaking chills.  Is not feeling well.  Denies any specific complaints.  6/10 AF WBC 5.3 somnolent No fever or new complaints   AF WBC 9 No fever or chills-some pain at surgical site. Limited participation with PT noted   AF WBC 7.3 somnolent but arousable-no new complaints  Labs Reviewed, Medications Reviewed, and Wound Reviewed.    Review of Systems:  Review of Systems   Constitutional: Negative for chills and fever.   Gastrointestinal: Negative for nausea and vomiting.   Musculoskeletal: Positive for joint pain.   All other systems reviewed and are negative.      Hemodynamics:  Temp (24hrs), Av.4 °C (97.5 °F), Min:36.3 °C (97.4 °F), Max:36.5 °C (97.7 °F)  Temperature: 36.3 °C (97.4 °F)  Pulse  Av  Min: 60  Max: 125  Blood Pressure : 139/81       Physical Exam:  Physical Exam   Constitutional: No distress.   HENT:   Mouth/Throat: No oropharyngeal exudate.   Eyes: Conjunctivae are normal. No scleral icterus.   Neck: Neck supple. No JVD present.   Cardiovascular: Normal rate.    No murmur heard.  Pulmonary/Chest: Effort normal. No stridor. She has no wheezes. She has no rales.   Abdominal: Soft. She  exhibits no distension. There is no tenderness.   Musculoskeletal: She exhibits tenderness. She exhibits no edema.   Right hip dressed   Lymphadenopathy:     She has no cervical adenopathy.   Neurological: She is alert.   Skin: Skin is warm. No rash noted. She is not diaphoretic.   Nursing note and vitals reviewed.      Meds:    Current Facility-Administered Medications:   •  Pharmacy Consult Request  •  HYDROmorphone  •  magnesium oxide  •  vancomycin  •  rivaroxaban  •  fluconazole  •  MD Alert...Vancomycin per Pharmacy  •  cefepime  •  acetaminophen  •  LORazepam  •  insulin regular **AND** Accu-Chek ACHS **AND** NOTIFY MD and PharmD **AND** glucose **AND** dextrose 10% bolus  •  NS  •  pramipexole  •  lidocaine  •  temazepam  •  cyclobenzaprine  •  Respiratory Care per Protocol  •  amLODIPine  •  cinacalcet  •  losartan  •  acetaminophen  •  atorvastatin  •  omeprazole  •  metoprolol  •  senna-docusate **AND** polyethylene glycol/lytes **AND** magnesium hydroxide **AND** bisacodyl  •  ondansetron  •  ondansetron    Labs:  Recent Labs      06/10/19   0011  06/11/19   0055  06/12/19   0241   WBC  5.3  9.0  7.3   RBC  3.71*  4.11*  4.23   HEMOGLOBIN  10.6*  11.4*  11.8*   HEMATOCRIT  32.4*  35.4*  37.2   MCV  87.3  86.1  87.9   MCH  28.6  27.7  27.9   RDW  56.9*  58.0*  59.9*   PLATELETCT  218  261  307   MPV  9.7  9.9  9.2   NEUTSPOLYS  59.80  65.10  64.40   LYMPHOCYTES  17.70*  17.30*  14.80*   MONOCYTES  13.50*  11.90  12.50   EOSINOPHILS  7.30*  4.00  6.60   BASOPHILS  1.10  0.90  1.20     Recent Labs      06/10/19   0011  06/11/19   0055  06/12/19   0241   SODIUM  134*  134*  133*   POTASSIUM  3.1*  4.8  4.1   CHLORIDE  103  103  100   CO2  24  23  24   GLUCOSE  111*  100*  93   BUN  6*  9  9     Recent Labs      06/10/19   0011  06/11/19   0055  06/12/19   0241   CREATININE  0.34*  0.49*  0.37*       Imaging:  Ct-pelvis With    Result Date: 5/28/2019 5/28/2019 12:54 PM HISTORY/REASON FOR EXAM: Follow-up  abscess TECHNIQUE/EXAM DESCRIPTION AND NUMBER OF VIEWS: CT scan of the pelvis with contrast. Contrast-enhanced helical scanning of the pelvis was obtained from the iliac crests through the pubic symphysis following the bolus administration of 100 mL of Omnipaque 350 nonionic contrast without complication. Low dose optimization technique was utilized for this CT exam including automated exposure control and adjustment of the mA and/or kV according to patient size. COMPARISON: MRI scan of the pelvis 5/20/2019 and CT chest abdomen and pelvis 4/23/2019 FINDINGS: Again redemonstrated is a 3.2 x 2 cm multiloculated low-attenuation collection in the right iliacus muscle unchanged from recent MRI scan of the pelvis. There is also a 2.8 x 1.7 cm low-attenuation collection in the right gluteus medius muscle. This collection is also unchanged from recent MRI scan of the pelvis. Previous fixation screws are noted coursing through the right lateral ilium across the sacrum extending into the left lateral ilium. There is a healing fracture of the right posterior ilium. There is no evidence of free fluid in the pelvis or pelvic mass. There are scattered air-fluid levels throughout the small bowel     1.  Stable abscesses again redemonstrated in the right iliac's muscle and right gluteus medius muscle. 2.  Status post fixation screws coursing through the jose antonio and sacrum for treatment of healing fracture in the right posterior ilium. 3.  Adynamic ileus.    Dx-chest-portable (1 View)    Result Date: 5/21/2019 5/21/2019 2:30 PM HISTORY/REASON FOR EXAM:  Right-sided neck pain. TECHNIQUE/EXAM DESCRIPTION AND NUMBER OF VIEWS: Single portable view of the chest. COMPARISON: 4/30/2019 FINDINGS: Single portable view of the chest demonstrates a normal cardiac silhouette and mediastinal contours. Calcification is in the aortic knob. No discrete opacity, pleural fluid, or pneumothorax. A right PICC line remains in place. The tip appears to be  located at the cavoatrial junction.     1.  No acute cardiopulmonary findings. 2.  Right PICC line place with the tip projecting over the cavoatrial junction    Dx-chest-portable (1 View)    Result Date: 4/30/2019 4/30/2019 4:19 PM HISTORY/REASON FOR EXAM:  PICC line placement. IV access necessity. TECHNIQUE/EXAM DESCRIPTION AND NUMBER OF VIEWS: Single portable view of the chest. COMPARISON:  4/22/2019 FINDINGS: A right arm PICC line is present in satisfactory position with its tip projecting over the SVC at the level of the jennifer. The cardiac silhouette is within normal limits. No infiltrates or consolidations are noted. No pleural effusion is noted.     1.  Right arm PICC line tip projects in satisfactory position. 2.  Clear chest.    Mr-brain-with & W/o    Result Date: 5/21/2019 5/20/2019 3:37 PM HISTORY/REASON FOR EXAM:  Follow-up brain abscesses. TECHNIQUE/EXAM DESCRIPTION:   MRI of the brain without and with contrast. T1 sagittal, T2 fast spin-echo axial, T1 coronal, FLAIR coronal, diffusion-weighted and apparent diffusion coefficient (ADC map) axial images were obtained of the whole brain. T1 postcontrast axial and T1 postcontrast coronal images were obtained. The study was performed on a [employer] Signa 1.5 Miranda MRI scanner. 6 mL Gadavist contrast was administered intravenously. COMPARISON:  MRI scan of the brain 4/24/2019 FINDINGS: There are innumerable small ovoid enhancing foci throughout the brain parenchyma, both in the supratentorial and infratentorial compartments. The number of these lesions has increased significantly since previous exam. The previously identified largest lesion in the right brachium pontis has resolved. There is evidence of increased T2 signal intensity in many of these lesions and there is surrounding increased T2 signal intensity in several lesions in the right superior frontal lobe. The calvariae are unremarkable. There are no extra-axial fluid collections. The ventricular system  and basal cisterns are mild to moderately prominent. There is mild-to-moderate prominence of the cortical sulci and gyri. There are no mass effects or shift of midline structures. There are no hemorrhagic lesions. The diffusion-weighted axial images show no evidence of acute cerebral infarction. The brainstem and posterior fossa structures are unremarkable. Vascular flow voids in the vertebrobasilar and carotid arteries, [place], and dural venous sinuses are intact. The paranasal sinuses and mastoids in the field of view are unremarkable.     1.  Increase in number of innumerable small ovoid enhancing lesions throughout the brain parenchyma. These lesions may represent microabscesses of either bacterial or fungal origin or possibly brain metastases. 2.  Age-related cerebral atrophy.    Mr-pelvis-with & W/o And Sequences    Result Date: 5/21/2019 5/20/2019 3:37 PM HISTORY/REASON FOR EXAM:  Abd pain, fever, abscess suspected; evaluate abscesses TECHNIQUE/EXAM DESCRIPTION: MRI of the pelvis without and with contrast. The study was performed on a [employer] Signa 1.5 Miranda MRI scanner. T1 axial, T1 coronal, T2 fast spin-echo fat-suppressed axial, and T2 fast spin-echo fat-suppressed coronal images were obtained of the pelvis. Postcontrast fat-suppressed intravenous gadolinium enhanced sequences in the axial and coronal planes were then  performed. 6 mL Gadavist contrast was administered intravenously. COMPARISON: 3/26/2019. CT 4/23/2019 FINDINGS: Interval decrease in size of the collection in the right gluteal medius muscle, measuring 1.4 x 1.9 cm, previously 2.3 x 2.8 cm. There is minimal surrounding stranding. Mild heterogeneity and stranding in the overlying right gluteus cornelio muscle without discrete collection. Grossly unchanged in size of the multiloculated collection in the right iliacus muscle, measuring 2.4 x 3.5 cm. Postsurgical change in the sacrum with susceptibility artifact from the screws. 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     1. Interval decrease in size of the collection in the right gluteal medius muscle, measuring 1.4 x 1.9 cm, previously 2.3 x 2.8 cm. There is minimal surrounding stranding. Mild heterogeneity and stranding in the overlying right gluteus cornelio muscle without discrete collection, could relate to reactive change or phlegmon. 2. Grossly unchanged in size of the multiloculated collection in the right iliacus muscle, measuring 2.4 x 3.5 cm.    Us-chest    Result Date: 2019 10:23 AM HISTORY/REASON FOR EXAM:  Right lateral breast pain, evaluate implant TECHNIQUE/EXAM DESCRIPTION AND NUMBER OF VIEWS: Targeted ultrasound of the right breast. COMPARISON: None FINDINGS: No gross mass or implant rupture are identified. Diagnostic assessment is not feasible lack of proper equipment tube performed breast imaging     1.  Nondiagnostic assessment of the right breast without gross implant rupture identified 2.  Recommend further assessment with mammography and ultrasound at an accredited breast facility    Ec-halie W/o Cont    Result Date: 2019  Transesophageal Echo Report Echocardiography Laboratory CONCLUSIONS Normal esophageal echocardiogram. No evidence of endocarditis. No significant change since the prior study of 3/15/2019. MARLENA CLIFFORD Exam Date:          2019                     12:48 Exam Location:      Inpatient Priority:            Routine Ordering Physician:        DARON WHELAN Referring Physician: Sonographer:               CATALINO Rivers Age:    68     Gender:    F MRN:    [id number] :    [id number] BSA:           Ht (in):           Wt (lb): Report Type:      Complete Indications:     Endocarditis and heart valve disorders in diseases                  classified elsewhere ICD Codes:       I39 CPT Codes:       33854 BP:          /          HR: Technical Quality:       Fair  MEASUREMENTS  (Male / Female) Normal Values 2D ECHO Estimated LV Ejection Fraction    65 %                  * Indicates values subject to auto-interpretation LV EF:  65    % Medications Medications determined by anesthesiologist. Limitations none Complications none Proc. Components The probe was inserted and manipulated by Dr Potter. Probe #SM  was used for this procedure. 2D, color Doppler, spectral Doppler, and 3D imaging were used as part of the evaluation as clinically indicated. FINDINGS Left Ventricle The left ventricle was normal in size and function. Right Ventricle The right ventricle was normal in size and function. Right Atrium The right atrium is normal in size. Left Atrium The left atrium is normal in size. LA Appendage Normal left atrial appendage. IA Septum The interatrial septum is normal. IV Septum The interventricular septum is normal. Mitral Valve Structurally normal mitral valve without significant stenosis or regurgitation.  No valvular vegetations. Aortic Valve Structurally normal aortic valve without significant stenosis or regurgitation.  No valvular vegetations. Tricuspid Valve Structurally normal tricuspid valve without significant stenosis or regurgitation.  No valvular vegetations. Pulmonic Valve Structurally normal pulmonic valve without significant stenosis or regurgitation.  No valvular vegetations. Pericardium Normal pericardium without effusion. Aorta The aortic root is normal. Christopher Potter MD (Electronically Signed) Final Date:     24 May 2019 15:42      Micro:  Results     Procedure Component Value Units Date/Time    URINALYSIS [[id number]]  (Abnormal) Collected:  06/10/19 1010    Order Status:  Completed Specimen:  Urine from Urine, Clean Catch Updated:  06/10/19 1053     Color Yellow     Character Clear     Specific Gravity 1.009     Ph 7.0     Glucose Negative mg/dL      Ketones Negative mg/dL      Protein Negative mg/dL      Bilirubin Negative     Urobilinogen, Urine 0.2  "    Nitrite Negative     Leukocyte Esterase Trace (A)     Occult Blood Negative     Micro Urine Req Microscopic    Narrative:       Collected By:[id number] SANAM MICHAELS    BLOOD CULTURE [[id number]] Collected:  06/04/19 1512    Order Status:  Completed Specimen:  Blood from Peripheral Updated:  06/09/19 1700     Significant Indicator NEG     Source BLD     Site PERIPHERAL     Culture Result No growth after 5 days of incubation.    Narrative:       Per Hospital Policy: Only change Specimen Src: to \"Line\" if  specified by physician order.  Left Hand    BLOOD CULTURE [[id number]] Collected:  06/04/19 1512    Order Status:  Completed Specimen:  Blood from Peripheral Updated:  06/09/19 1700     Significant Indicator NEG     Source BLD     Site PERIPHERAL     Culture Result No growth after 5 days of incubation.    Narrative:       Per Hospital Policy: Only change Specimen Src: to \"Line\" if  specified by physician order.  Left Forearm/Arm    Cryptococcal Antigen [[id number]]     Order Status:  Sent Specimen:  Blood     Cryptococcal Antigen [[id number]]     Order Status:  Canceled Specimen:  CSF     Cryptococcal Antigen [[id number]]     Order Status:  Sent Specimen:  Blood     Cryptococcal Antigen Titer [[id number]]     Order Status:  Canceled Specimen:  Blood from Blood     Cryptococcal Antigen [[id number]]     Order Status:  Canceled Specimen:  Blood     C Diff by PCR rflx Toxin [[id number]] Collected:  06/06/19 0900    Order Status:  Completed Specimen:  Stool from Stool Updated:  06/06/19 1541     C Diff by PCR Negative     Comment: C. difficile NOT detected by PCR.  Treatment not indicated per guidelines.  Repeat testing not indicated within 7 days.          027-NAP1-BI Presumptive Negative     Comment: Presumptive 027/NAP1/BI target DNA sequences are NOT DETECTED.       Narrative:       Special Contact [id number] MERYL PICKENS  Does this patient have risk factors for C-diff?->Yes          Assessment:  Gluteal and " "iliopsoas abscess  Brain abscesses vs mets  Breast cancer  DM    Plan:  FUO  Fever and chills 6/4  Remains afebrile  Panculture neg  1, 3 beta glucan neg  QuantiGold+. CXR neg  S/p removal hardware 6/5    LTBI vs CNS TB  As CNS lesions previously improved on nontuberculosis treatment, continuing as below  Will reassess treatmennt plan after MRI    Brain abscesses vs mets  MRI 4/23 \"supra and infratentorial brain parenchyma. Decrease in the size of the lesions. Interval reduction in the extent of the surrounding white matter edema consistent with improvement/treatment response of the most of the multifocal brain abscesses.  MRI 5/21 showed innumerable microabscesses vs mets  MRI on 6/7 with interval progression of supra and infratentorial intra--axial nodules and associated edema.  New right thalamus lesion. Radiology favors infection over neoplasm though both remain considerations (had been off abx)  Neurosurgery evaluation - recommended continuing antibiotics for another 2 weeks and then repeating MRI brain, as the lesions are not amenable to biopsy   Mult blood cultures neg  CHAS neg 3/15 and 5/24  +QuantiGold as above  Continue vanco/cefepime   Plan for repeat MRI around 6/21/2019 to assess for improvement  Monitor renal function while on vanco     Type 2 DM  Hemoglobin A1c 6.3   Keep BS under 150 to help control current infection  BS 83-95      Gluteal and iliopsoas abscess s/p treatment.   Adjacent to hardware in right hip (placed 12/17/18)  CT 4/23 \"Fluid collections within the right gluteal and iliacis muscles.. The collection within the right gluteal muscle has unchanged while the fluid collection within the right iliacis muscle is increased somewhat in size.\" 2.7 cm  Cultures 3/29 and 4/4 neg  MRI on 5/20/2019 - interval decrease in collection in R gluteal muscle and persistent multiloculated collection in R iliacus muscle.   CT 5/28 no change  s/p drainage on 5/30/2019-cultures negative  S/p removal " hardware 6/5-no cultures done    I have performed a physical exam and reviewed and updated ROS as of today.  In review of yesterday's note dated  6/11/2019, there are no changes except as documented above.

## 2019-06-12 NOTE — PROGRESS NOTES
Hospital Medicine Daily Progress Note    Date of Service  6/11/2019    Chief Complaint  68 y.o. female admitted 5/29/2019 with persistent abscess.    Hospital Course     Ms. Martini is a 68-year-old female who was transferred from [facility] on 5/29/2019 with persistent right gluteal abscesses since sacral fracture repair in December.  She has had multiple IR drainage.  She initially presented with pelvic pain, low back pain, and confusion. She also had new slurred speech. Imaging of the abdomen, pelvis and brain revealed abscesses and she was treated with a full course of IV antibiotics per ID.  Her slurred speech improved and she was able to ambulate with physical therapy. However, repeat imaging showed persistent pelvic abscesses.  MRI brain shows increase in brain lesions.  Her brain lesions are known from prior imaging and there is concern they represent metastasis.  Oncology aware and do not recommend further imaging. Cultures remained negative and a CHAS done by Dr. Potter showed no vegetations. The size of the pelvic abscesses was reviewed by interventional radiology and the case was discussed with Dr. Finley who recommended transfer to [facility] for CT guided drainage.  S/P drainage on 5/30. On 6/4, febrile again, so restarted cefepime and vancomycin per ID and has been on them since.  Right hip hardware removal 6/5. MRI brain repeat 6/7/19 saw progression of the supra and infratentorial intra-axial nodules with edema, prompting a Neurosurgery consult.  Dr. Nguyen stated lesions not amendable to bx stereotactically, favors infectious, recs abx, antineuroleptic and repeat brain MRI in 2 weeks (around 6/21).  CT pelvis 6/9 with residual fluid collection 2.5 x 1.8 cm in right iliac m., slightly smaller than before.  Cefepime + vanc continue. Now broaden work up to include fungal and empiric started fluconazole 6/9.        Interval Problem Update  Pain not controlled.  Moaning, holding both of her legs.  Diaphoretic.       Consultants/Specialty  Infectious disease  Orthopedic surgery  Neurosurgery, Dr. Nguyen    Code Status  DNR/DNI    Disposition  Anticipate SNF need at discharge.  Transfered to Med/Onc.    PT and OT to eval for SNF need    Review of Systems  Review of Systems   Constitutional: Negative for fever.   HENT: Negative for ear pain.    Eyes: Negative for blurred vision.   Respiratory: Negative for shortness of breath.    Cardiovascular: Negative for chest pain.   Gastrointestinal: Negative for abdominal pain, blood in stool, constipation, diarrhea, melena and vomiting.   Genitourinary: Negative for dysuria, hematuria and urgency.   Musculoskeletal: Positive for joint pain and myalgias.   Skin: Negative for rash.   Neurological: Negative for dizziness and headaches.   All other systems reviewed and are negative.       Physical Exam  Temp:  [36.1 °C (96.9 °F)-37.1 °C (98.7 °F)] 36.3 °C (97.4 °F)  Pulse:  [] 94  Resp:  [14-18] 14  BP: (121-177)/() 145/82  SpO2:  [95 %-98 %] 98 %      Physical Exam   Constitutional: She is oriented to person, place, and time. She appears well-developed and well-nourished. She appears distressed.   In pain, moaning.   HENT:   Head: Normocephalic and atraumatic.   Eyes: Conjunctivae are normal. No scleral icterus.   Neck: Neck supple.   Cardiovascular: Regular rhythm and normal heart sounds.  Exam reveals no gallop and no friction rub.    No murmur heard.  Pulmonary/Chest: Effort normal and breath sounds normal. No respiratory distress. She has no wheezes. She has no rales.   Abdominal: Soft. Bowel sounds are normal. She exhibits no distension and no mass. There is no tenderness. There is no rebound and no guarding.   Musculoskeletal: She exhibits tenderness. She exhibits no edema.   Neurological: She is alert and oriented to person, place, and time.   Skin: Skin is warm. She is diaphoretic.   Psychiatric: Her behavior is normal. Thought content normal.   Nursing note and vitals  reviewed.      Fluids    Intake/Output Summary (Last 24 hours) at 06/11/19 2245  Last data filed at 06/11/19 2200   Gross per 24 hour   Intake              250 ml   Output                0 ml   Net              250 ml       Laboratory  Recent Labs      06/09/19   0057  06/10/19   0011  06/11/19   0055   WBC  5.0  5.3  9.0   RBC  3.81*  3.71*  4.11*   HEMOGLOBIN  10.7*  10.6*  11.4*   HEMATOCRIT  32.5*  32.4*  35.4*   MCV  85.3  87.3  86.1   MCH  28.1  28.6  27.7   MCHC  32.9*  32.7*  32.2*   RDW  55.8*  56.9*  58.0*   PLATELETCT  214  218  261   MPV  9.6  9.7  9.9     Recent Labs      06/09/19   0057  06/10/19   0011  06/11/19   0055   SODIUM  135  134*  134*   POTASSIUM  3.6  3.1*  4.8   CHLORIDE  103  103  103   CO2  25  24  23   GLUCOSE  79  111*  100*   BUN  7*  6*  9   CREATININE  0.38*  0.34*  0.49*   CALCIUM  7.4*  7.5*  7.9*                   Imaging  CT-PELVIS WITH   Final Result      1.  Residual or recurrent abscess within the right iliacus muscle measuring 2.5 x 1.8 cm. It slightly smaller than on 5/28/2019      2.  Interval removal of hardware from the iliac bones and sacrum      3.  Lytic change of the right iliac bone and the sacrum. This could be related to hardware versus osteomyelitis.      4.  Subacute fractures of the right ilium, sacrum, and there is lucency within the left iliac bone that is likely from hardware      MR-BRAIN-WITH & W/O   Final Result      1.  Interval progression of supra and infratentorial intra-axial nodules and associated edema. This short-term interval progression favors infection over neoplasm though both remain considerations.   2.  Mild atrophy      DX-PORTABLE FLUOROSCOPY < 1 HOUR   Final Result         Portable fluoroscopy utilized for 2 minutes.      DX-PELVIS-1 OR 2 VIEWS   Final Result      Status post hardware removal from the right SI joint      DX-CHEST-PORTABLE (1 VIEW)   Final Result      Interstitial prominence could be due to pulmonary edema or  hypoventilatory change.      DX-CHEST-LIMITED (1 VIEW)   Final Result      LEFT basilar underinflation atelectasis or pneumonia      CT-DRAIN-HEMATOMA - SEROMA   Final Result      CT-guided RIGHT pelvic fluid collection aspiration, laboratory evaluation pending              Assessment/Plan    Uncontrolled pain:  Start dilaudid PCA with 1mg dilaudid load now.  DC all other narcotics.  Calcium gluconate 1gm IV x1.  Discussed with RN.  Already fully anticoagulated.      Diarrhea:  Now constipated.  Ruled out for C. difficile.  Supportive care.  IV fluid.  Bowel regimen.    Brain lesions: not amenable to biopsy and is favor infectious etiology per Neurosurgery.  Also recommend to continue antibiotics and repeat brain MRI in 2 weeks (~6/21).  Dr. Junior sent labs for fungal work up, some labs are sent out (cryptococcal ag).  Fluconazole 800mg daily started 6/9.      * Abscess of right iliac muscle and right gluteus medius muscle- (present on admission)   Assessment & Plan    Recurrent issue since sacral fx repair in December 2018. Has had multiple IR drainage and completed abx.  CT guided abscess drainage on 5/30; 2 mL removed and cultured but negative and therefore abx stopped.  Then 4 days later became febrile, so cefepime and vamc started.  Been on them since.  Right hip hardware removal on 6/5.  CT pelvis with residual 2.5 x 1.8 cm fluid collection in the right iliac muscle, improved compared to prior.    - cont cefepime, vancomycin     Sepsis (HCC)   Assessment & Plan    See above for source.  Resolved.     Hypomagnesemia- (present on admission)   Assessment & Plan    Improved.  Monitor intermittently      RLS (restless legs syndrome)- (present on admission)   Assessment & Plan    Restarted home dose of pramipexole.  Tolerating well     Chronic pain- (present on admission)   Assessment & Plan    See above     History of DVT (deep vein thrombosis)- (present on admission)   Assessment & Plan    Restart Xarelto 15mg  daily (held from 6/3-6/9 for right hip hardware removal) as cleared by Ortho as 5 days outside of procedure.     Hypercalcemia- (present on admission)   Assessment & Plan    Chronic. Continue sensipar.      Essential hypertension- (present on admission)   Assessment & Plan    Continues to be normotensive   - Continue home losartan, metoprolol, & amlodipine.      Chronic hyponatremia- (present on admission)   Assessment & Plan    Stable.  No neurological change.  Continue to monitor.  1/2 normal saline.     History of breast cancer- (present on admission)   Assessment & Plan    S/p left mastectomy and breast implant  -Outpatient follow-up     COPD (chronic obstructive pulmonary disease) (HCC)- (present on admission)   Assessment & Plan    Stable. Doing well on room air. No respiratory distress/SOB. No evidence of exacerbation but will continue to monitor.         Hypokalemia: Order 40 mEq of K-Dur x1.    VTE prophylaxis: Fully anticoagulated with Xarelto

## 2019-06-12 NOTE — PROGRESS NOTES
Assumed care of patient, bedside report received from Jessica. Updated on POC, call light within reach and fall precautions in place. Bed locked and in lowest position. Patient instructed to call for assistance before getting out of bed. All questions answered, no other needs at this time.

## 2019-06-12 NOTE — PROGRESS NOTES
2 RN skin check completed with Jessica   Patient has generalized bruising on BLE   Right hip dressing CD&I changed today.   Patient's sacrum is discolored / blanching   BLE discoloration over shin L> R   BL heels bogy, pink, blanching.

## 2019-06-12 NOTE — PROGRESS NOTES
Hospital Medicine Daily Progress Note    Date of Service  6/12/2019    Chief Complaint  68 y.o. female admitted 5/29/2019 with  persistent abscess.    Hospital Course     Ms. Martini is a 68-year-old female who was transferred from [facility] on 5/29/2019 with persistent right gluteal abscesses since sacral fracture repair in December.  She has had multiple IR drainage.  She initially presented with pelvic pain, low back pain, and confusion. She also had new slurred speech. Imaging of the abdomen, pelvis and brain revealed abscesses and she was treated with a full course of IV antibiotics per ID.  Her slurred speech improved and she was able to ambulate with physical therapy. However, repeat imaging showed persistent pelvic abscesses.  MRI brain shows increase in brain lesions.  Her brain lesions are known from prior imaging and there is concern they represent metastasis.  Oncology aware and do not recommend further imaging. Cultures remained negative and a CHAS done by Dr. Potter showed no vegetations. The size of the pelvic abscesses was reviewed by interventional radiology and the case was discussed with Dr. Finley who recommended transfer to [facility] for CT guided drainage.  S/P drainage on 5/30. On 6/4, febrile again, so restarted cefepime and vancomycin per ID and has been on them since.  Right hip hardware removal 6/5. MRI brain repeat 6/7/19 saw progression of the supra and infratentorial intra-axial nodules with edema, prompting a Neurosurgery consult.  Dr. Nguyen stated lesions not amendable to bx stereotactically, favors infectious, recs abx, antineuroleptic and repeat brain MRI in 2 weeks (around 6/21).  CT pelvis 6/9 with residual fluid collection 2.5 x 1.8 cm in right iliac m., slightly smaller than before.  Cefepime + vanc continue. Now broaden work up to include fungal and empiric started fluconazole 6/9.        Interval Problem Update  Pain not controlled.  Moaning, holding both of her legs.  Diaphoretic.       Consultants/Specialty  Infectious disease  Orthopedic surgery  Neurosurgery, Dr. Nguyen    Code Status  DNR/DNI    Disposition  pending    Review of Systems  Review of Systems   Constitutional: Negative for chills, diaphoresis and fever.   HENT: Negative for ear discharge, ear pain and tinnitus.    Eyes: Negative for blurred vision, double vision and photophobia.   Respiratory: Negative for cough, hemoptysis and sputum production.    Cardiovascular: Negative for chest pain and palpitations.   Gastrointestinal: Negative for heartburn, nausea and vomiting.   Genitourinary: Negative for dysuria, frequency and urgency.   Musculoskeletal: Positive for joint pain (hip pain).   Skin: Negative for itching.   Neurological: Negative for tingling, tremors and headaches.        Physical Exam  Temp:  [36.3 °C (97.4 °F)-36.5 °C (97.7 °F)] 36.3 °C (97.4 °F)  Pulse:  [] 89  Resp:  [14-20] 20  BP: (130-177)/() 139/81  SpO2:  [93 %-100 %] 93 %    Physical Exam   Constitutional: No distress.   HENT:   Head: Normocephalic and atraumatic.   Eyes: Pupils are equal, round, and reactive to light. Conjunctivae are normal.   Neck: Normal range of motion. Neck supple. No JVD present.   Cardiovascular: Normal rate and regular rhythm.    Pulmonary/Chest: Effort normal and breath sounds normal. No stridor.   Abdominal: Soft. Bowel sounds are normal.   Musculoskeletal: She exhibits tenderness (hips).   Neurological: She is alert.   Skin: Skin is warm and dry. She is not diaphoretic.       Fluids    Intake/Output Summary (Last 24 hours) at 06/12/19 1008  Last data filed at 06/12/19 0935   Gross per 24 hour   Intake           267.45 ml   Output              250 ml   Net            17.45 ml       Laboratory  Recent Labs      06/10/19   0011  06/11/19   0055  06/12/19   0241   WBC  5.3  9.0  7.3   RBC  3.71*  4.11*  4.23   HEMOGLOBIN  10.6*  11.4*  11.8*   HEMATOCRIT  32.4*  35.4*  37.2   MCV  87.3  86.1  87.9   MCH  28.6  27.7  27.9    MCHC  32.7*  32.2*  31.7*   RDW  56.9*  58.0*  59.9*   PLATELETCT  218  261  307   MPV  9.7  9.9  9.2     Recent Labs      06/10/19   0011  06/11/19   0055  06/12/19   0241   SODIUM  134*  134*  133*   POTASSIUM  3.1*  4.8  4.1   CHLORIDE  103  103  100   CO2  24  23  24   GLUCOSE  111*  100*  93   BUN  6*  9  9   CREATININE  0.34*  0.49*  0.37*   CALCIUM  7.5*  7.9*  8.9                   Imaging    Assessment/Plan  * Abscess of right iliac muscle and right gluteus medius muscle- (present on admission)   Assessment & Plan    Recurrent issue since sacral fx repair in December 2018. Has had multiple IR drainage and completed antibx.  - S/P CT guided abscess drainage on 5/30; 2 mL removed  I.D input is noted  Neurosurgery input is noted  Ortho input is noted  On cefepime  On vancomycin  On diflucamn  - hardware removal on 6/5  MRI on 6/7 with interval progression of supra and infratentorial intra--axial nodules and associated edema.  New right thalamus lesion. Radiology favors infection over neoplasm though both remain considerations (had been off abx)  Neurosurgical evaluation - recommended continuing antibiotics for another 2 weeks and then repeating MRI brain, per neurosurgery the lesions are not amenable to biopsy   Mult blood cultures neg  CHAS neg 3/15 and 5/24  LTBI vs CNS TB, new  As CNS lesions previously improved on nontuberculosis treatment     Sepsis (HCC)   Assessment & Plan    This is sepsis (without associated acute organ dysfunction).  Patient with new fever and tachycardia today.  From her retained hip hardware versus new infection.  Concern for PICC line infection  Has resolved  I.D input is noted  Cultures are reviewed  Cefepime  Vancomycin  diflucan         Hypomagnesemia- (present on admission)   Assessment & Plan    Repleted previously.  Still critically low, 1.1 this morning.  4mg magnesium sulfate ordered x1 STAT     RLS (restless legs syndrome)- (present on admission)   Assessment & Plan     Restarted home dose of pramipexole.  Tolerating well     Chronic pain- (present on admission)   Assessment & Plan    Primarily located at left hip.  -Continue PRN oxycodone and flexeril.  -Morphine for breakthrough  -Lidocaine patches      Malnutrition (HCC)   Assessment & Plan    Moderate  As per dietitian     History of DVT (deep vein thrombosis)- (present on admission)   Assessment & Plan    On xarelto       Hypercalcemia- (present on admission)   Assessment & Plan    Has resolved      Essential hypertension- (present on admission)   Assessment & Plan    Controlled   losartan, metoprolol, & amlodipine.      Chronic hyponatremia- (present on admission)   Assessment & Plan    Last level checked on 5/31, sodium was 134.  -Repeat level     History of breast cancer- (present on admission)   Assessment & Plan    S/p left mastectomy and breast implant  -Outpatient follow-up     COPD (chronic obstructive pulmonary disease) (HCC)- (present on admission)   Assessment & Plan    Stable. Doing well on room air. No respiratory distress/SOB. No evidence of exacerbation but will continue to monitor.           VTE prophylaxis: xarelto

## 2019-06-12 NOTE — PROGRESS NOTES
2 RN skin check completed with Nancy   Patient's right hip surgical dressing is CD&I   Sacrum is / discolored / red / blanching with small moisture fissure   BL heels are red, bogy, blanching.

## 2019-06-12 NOTE — PROGRESS NOTES
"   Orthopaedic Progress Note    Interval changes:  Patient doing well   Dressing changed incision without complication  Cleared for DC to SNF by ortho pending medicine and ID team clearance     ROS - Patient denies any new issues.  Pain well controlled.    /82   Pulse (!) 108   Temp 36.4 °C (97.5 °F) (Temporal)   Resp 18   Ht 1.575 m (5' 2\")   Wt 54.2 kg (119 lb 7.8 oz)   SpO2 95%       Patient seen and examined  No acute distress  Breathing non labored  RRR  Right hip surgical dressing changed, surgical incision is well approximated and is dry and clean.  There is no erythema, induration, or signs of infection. Patient clearly fires tibialis anterior, EHL, and gastrocnemius/soleus. Sensation is intact to light touch throughout superficial peroneal, deep peroneal, tibial, saphenous, and sural nerve distributions. Strong and palpable 2+ dorsalis pedis and posterior tibial pulses with capillary refill less than 2 seconds. No lower leg tenderness or discomfort.        Recent Labs      06/09/19   0057  06/10/19   0011  06/11/19   0055   WBC  5.0  5.3  9.0   RBC  3.81*  3.71*  4.11*   HEMOGLOBIN  10.7*  10.6*  11.4*   HEMATOCRIT  32.5*  32.4*  35.4*   MCV  85.3  87.3  86.1   MCH  28.1  28.6  27.7   MCHC  32.9*  32.7*  32.2*   RDW  55.8*  56.9*  58.0*   PLATELETCT  214  218  261   MPV  9.6  9.7  9.9       Active Hospital Problems    Diagnosis   • Sepsis (HCC) [A41.9]     Priority: High   • Abscess of right iliac muscle and right gluteus medius muscle [L02.91]     Priority: High   • Hypomagnesemia [E83.42]     Priority: Medium   • Chronic pain [G89.29]     Priority: Medium   • RLS (restless legs syndrome) [G25.81]     Priority: Medium   • Hypercalcemia [E83.52]     Priority: Low   • Essential hypertension [I10]     Priority: Low   • Chronic hyponatremia [E87.1]     Priority: Low   • History of breast cancer [Z85.3]     Priority: Low   • COPD (chronic obstructive pulmonary disease) (HCC) [J44.9]     Priority: " Low   • History of DVT (deep vein thrombosis) [Z86.718]       Assessment/Plan:  Cleared for DC by ortho pending medicine and ID team clearance  POD#6 S/P Hardware removal, pelvis  Wt bearing status - WBAT  Wound care/Drains - dressing changes every other day by nursing  Future Procedures - none planned   Lovenox: Start 6/6, Duration-until ambulatory > 150'  Sutures/Staples out- 10-14 days post operatively  PT/OT-initiated  Antibiotics: maxipime 2g IV BID, vanco 600mg IV QD  DVT Prophylaxis- TEDS/SCDs/Foot pumps  Sue-none  Case Coordination for Discharge Planning - Disposition pending abx needs

## 2019-06-12 NOTE — CARE PLAN
Problem: Safety  Goal: Will remain free from injury  Outcome: PROGRESSING AS EXPECTED  Bed locked and in lowest position. Bed alarm on. Treaded socks in use. Call and belongings within reach. Patient educated to call for assistance. Patient verbalizes understanding. Hourly rounding in place.      Problem: Skin Integrity  Goal: Risk for impaired skin integrity will decrease  Outcome: PROGRESSING AS EXPECTED  Skin assessment completed. Pillows in use for positioning and to float heels. Silicone oxygen tubing in place. Frequent linen changes to ensure patient stays dry. Hourly rounding in place.

## 2019-06-12 NOTE — DOCUMENTATION QUERY
[facility]                                                                       Query Response Note      PATIENT:               MARLENA CLIFFORD  ACCT #:                  [id number]  MRN:                     [id number]  :                      1950  ADMIT DATE:       2019 4:43 PM  DISCH DATE:          RESPONDING  PROVIDER #:        [id number]           QUERY TEXT:    Moderate malnutrition per registered dietician note is documented in the Medical Record.  Can a diagnosis be provided to support this finding.    NOTE:  If an appropriate response is not listed below, please respond with a new note.    The patient's Clinical Indicators include:  - Registered dietician note dated 6/10/19: Pt with moderate malnutriton in context of chronic illness related to multiple abscesses as evidenced by potential 15% weight loss in 1 month and moderate muscle wasting at temporal region and mild fat  loss at orbital fat pads. consuming on average 70% of meals  - Treatment: registered dietician consult, Boost glucose control TID    Query created by: Gela Mitchell on 2019 9:41 AM    RESPONSE TEXT:    Moderate protein calorie malnutrition          Electronically signed by:  Dipak Almonte MD 2019 6:47 AM

## 2019-06-12 NOTE — PROGRESS NOTES
"Pharmacy Kinetics 68 y.o. female on vancomycin day # 9 6/12/2019    Currently on Vancomycin 600 mg iv q12hr (00, 12)      Indication for Treatment: Brain abscess, iliopsoas abscess      Pertinent history per medical record: Admitted on 5/29/2019 for pelvic abscess. Patient was recently admitted to [facility] from 3/8-4/30 for fever. She was found to have possible brain abscess as well as iliopsoas abscess. ID was consulted during that admission and she was treated with meropenem and vancomycin, with plans for 6 weeks of antibiotics. She eventually transferred to LTAC for completion of antibiotics. CT scan on 5/28 again demonstrated pelvic abscess, prompting return to [facility] for I&D, which was performed with IR on 5/30. She had removal of possibly infected hardware on 6/5. MRI showed increase in the number of innumerable small ovoid enhancing lesions throughout the brain parenchyma (possible brain abscess vs metastatic lesions). ID is following.      Other antibiotics: cefepime 2 g IV q12h     Allergies: Patient has no known allergies.      List concerns for renal function: age      Pertinent cultures to date:   06/04/19: C.Diff: Negative   06/04/19: PBCx2: NGTD   05/30/19: Pelvic abscess,BF: NGTD      MRSA nares swab if pneumonia is a concern (ordered/positive/negative/n-a): NA    Recent Labs      06/10/19   0011  06/11/19   0055  06/12/19   0241   WBC  5.3  9.0  7.3   NEUTSPOLYS  59.80  65.10  64.40     Recent Labs      06/10/19   0011  06/11/19   0055  06/12/19   0241   BUN  6*  9  9   CREATININE  0.34*  0.49*  0.37*     Recent Labs      06/10/19   1122  06/12/19   1135   VANCOTROUGH  21.6*  47.1*     Intake/Output Summary (Last 24 hours) at 06/12/19 1423  Last data filed at 06/12/19 0935   Gross per 24 hour   Intake           267.45 ml   Output              250 ml   Net            17.45 ml      /81   Pulse 89   Temp 36.3 °C (97.4 °F) (Oral)   Resp 20   Ht 1.575 m (5' 2\")   Wt 55 kg (121 lb 4.1 oz)   SpO2 93%  " Temp (24hrs), Av.4 °C (97.5 °F), Min:36.3 °C (97.4 °F), Max:36.5 °C (97.7 °F)      A/P   1. Vancomycin dose change: hold vancomycin  2. Next vancomycin level: random trough at 0800 19  3. Goal trough: ~18mcg/mL (brain abscess)  4. Comments: Trough on q12hr dosing resulted supratherapeutic.  Renal indices remain stable.  Patient appears to not tolerate q12hr dosing, however was subtherapeutic on 800mg q24hr.  Will check a random level in AM to ensure clearance, then resume on q24hr interval.    Erwin Christiansen, PharmD, BCPS

## 2019-06-13 LAB
ALBUMIN SERPL BCP-MCNC: 3.7 G/DL (ref 3.2–4.9)
ALBUMIN/GLOB SERPL: 0.9 G/DL
ALP SERPL-CCNC: 339 U/L (ref 30–99)
ALT SERPL-CCNC: 12 U/L (ref 2–50)
ANION GAP SERPL CALC-SCNC: 10 MMOL/L (ref 0–11.9)
AST SERPL-CCNC: 32 U/L (ref 12–45)
BASOPHILS # BLD AUTO: 1.1 % (ref 0–1.8)
BASOPHILS # BLD: 0.1 K/UL (ref 0–0.12)
BILIRUB SERPL-MCNC: 0.5 MG/DL (ref 0.1–1.5)
BUN SERPL-MCNC: 11 MG/DL (ref 8–22)
CALCIUM SERPL-MCNC: 9.4 MG/DL (ref 8.5–10.5)
CHLORIDE SERPL-SCNC: 99 MMOL/L (ref 96–112)
CO2 SERPL-SCNC: 25 MMOL/L (ref 20–33)
CREAT SERPL-MCNC: 0.46 MG/DL (ref 0.5–1.4)
EOSINOPHIL # BLD AUTO: 0.51 K/UL (ref 0–0.51)
EOSINOPHIL NFR BLD: 5.6 % (ref 0–6.9)
ERYTHROCYTE [DISTWIDTH] IN BLOOD BY AUTOMATED COUNT: 61 FL (ref 35.9–50)
EST. AVERAGE GLUCOSE BLD GHB EST-MCNC: 100 MG/DL
GLOBULIN SER CALC-MCNC: 4.2 G/DL (ref 1.9–3.5)
GLUCOSE BLD-MCNC: 101 MG/DL (ref 65–99)
GLUCOSE BLD-MCNC: 101 MG/DL (ref 65–99)
GLUCOSE BLD-MCNC: 87 MG/DL (ref 65–99)
GLUCOSE BLD-MCNC: 93 MG/DL (ref 65–99)
GLUCOSE SERPL-MCNC: 84 MG/DL (ref 65–99)
HBA1C MFR BLD: 5.1 % (ref 0–5.6)
HCT VFR BLD AUTO: 39.9 % (ref 37–47)
HGB BLD-MCNC: 12.3 G/DL (ref 12–16)
IMM GRANULOCYTES # BLD AUTO: 0.05 K/UL (ref 0–0.11)
IMM GRANULOCYTES NFR BLD AUTO: 0.5 % (ref 0–0.9)
LYMPHOCYTES # BLD AUTO: 1.14 K/UL (ref 1–4.8)
LYMPHOCYTES NFR BLD: 12.5 % (ref 22–41)
MCH RBC QN AUTO: 28 PG (ref 27–33)
MCHC RBC AUTO-ENTMCNC: 30.8 G/DL (ref 33.6–35)
MCV RBC AUTO: 90.9 FL (ref 81.4–97.8)
MONOCYTES # BLD AUTO: 1.01 K/UL (ref 0–0.85)
MONOCYTES NFR BLD AUTO: 11.1 % (ref 0–13.4)
NEUTROPHILS # BLD AUTO: 6.31 K/UL (ref 2–7.15)
NEUTROPHILS NFR BLD: 69.2 % (ref 44–72)
NRBC # BLD AUTO: 0 K/UL
NRBC BLD-RTO: 0 /100 WBC
PLATELET # BLD AUTO: 373 K/UL (ref 164–446)
PMV BLD AUTO: 9.4 FL (ref 9–12.9)
POTASSIUM SERPL-SCNC: 4 MMOL/L (ref 3.6–5.5)
PROT SERPL-MCNC: 7.9 G/DL (ref 6–8.2)
RBC # BLD AUTO: 4.39 M/UL (ref 4.2–5.4)
SODIUM SERPL-SCNC: 134 MMOL/L (ref 135–145)
VANCOMYCIN TROUGH SERPL-MCNC: 15.5 UG/ML (ref 10–20)
WBC # BLD AUTO: 9.1 K/UL (ref 4.8–10.8)

## 2019-06-13 PROCEDURE — 700102 HCHG RX REV CODE 250 W/ 637 OVERRIDE(OP): Performed by: NURSE PRACTITIONER

## 2019-06-13 PROCEDURE — 80053 COMPREHEN METABOLIC PANEL: CPT

## 2019-06-13 PROCEDURE — 700102 HCHG RX REV CODE 250 W/ 637 OVERRIDE(OP): Performed by: INTERNAL MEDICINE

## 2019-06-13 PROCEDURE — A9270 NON-COVERED ITEM OR SERVICE: HCPCS | Performed by: INTERNAL MEDICINE

## 2019-06-13 PROCEDURE — 700102 HCHG RX REV CODE 250 W/ 637 OVERRIDE(OP): Performed by: HOSPITALIST

## 2019-06-13 PROCEDURE — 99232 SBSQ HOSP IP/OBS MODERATE 35: CPT | Performed by: HOSPITALIST

## 2019-06-13 PROCEDURE — 85025 COMPLETE CBC W/AUTO DIFF WBC: CPT

## 2019-06-13 PROCEDURE — 82962 GLUCOSE BLOOD TEST: CPT | Mod: 91

## 2019-06-13 PROCEDURE — A9270 NON-COVERED ITEM OR SERVICE: HCPCS | Performed by: NURSE PRACTITIONER

## 2019-06-13 PROCEDURE — 99232 SBSQ HOSP IP/OBS MODERATE 35: CPT | Performed by: INTERNAL MEDICINE

## 2019-06-13 PROCEDURE — 36415 COLL VENOUS BLD VENIPUNCTURE: CPT

## 2019-06-13 PROCEDURE — 80202 ASSAY OF VANCOMYCIN: CPT

## 2019-06-13 PROCEDURE — 700111 HCHG RX REV CODE 636 W/ 250 OVERRIDE (IP): Performed by: INTERNAL MEDICINE

## 2019-06-13 PROCEDURE — A9270 NON-COVERED ITEM OR SERVICE: HCPCS | Performed by: FAMILY MEDICINE

## 2019-06-13 PROCEDURE — A9270 NON-COVERED ITEM OR SERVICE: HCPCS | Performed by: HOSPITALIST

## 2019-06-13 PROCEDURE — 700111 HCHG RX REV CODE 636 W/ 250 OVERRIDE (IP): Performed by: HOSPITALIST

## 2019-06-13 PROCEDURE — 83036 HEMOGLOBIN GLYCOSYLATED A1C: CPT

## 2019-06-13 PROCEDURE — 770006 HCHG ROOM/CARE - MED/SURG/GYN SEMI*

## 2019-06-13 PROCEDURE — 700105 HCHG RX REV CODE 258: Performed by: HOSPITALIST

## 2019-06-13 PROCEDURE — 700105 HCHG RX REV CODE 258: Performed by: INTERNAL MEDICINE

## 2019-06-13 PROCEDURE — 700102 HCHG RX REV CODE 250 W/ 637 OVERRIDE(OP): Performed by: FAMILY MEDICINE

## 2019-06-13 RX ADMIN — ACETAMINOPHEN 1000 MG: 500 TABLET ORAL at 04:46

## 2019-06-13 RX ADMIN — SENNOSIDES AND DOCUSATE SODIUM 2 TABLET: 8.6; 5 TABLET ORAL at 04:46

## 2019-06-13 RX ADMIN — SENNOSIDES AND DOCUSATE SODIUM 2 TABLET: 8.6; 5 TABLET ORAL at 18:02

## 2019-06-13 RX ADMIN — METOPROLOL TARTRATE 25 MG: 25 TABLET, FILM COATED ORAL at 04:49

## 2019-06-13 RX ADMIN — ACETAMINOPHEN 1000 MG: 500 TABLET ORAL at 18:03

## 2019-06-13 RX ADMIN — PRAMIPEXOLE DIHYDROCHLORIDE 1 MG: 0.5 TABLET ORAL at 04:48

## 2019-06-13 RX ADMIN — CEFEPIME 2 G: 2 INJECTION, POWDER, FOR SOLUTION INTRAVENOUS at 04:38

## 2019-06-13 RX ADMIN — PRAMIPEXOLE DIHYDROCHLORIDE 1 MG: 0.5 TABLET ORAL at 12:31

## 2019-06-13 RX ADMIN — MAGNESIUM OXIDE TAB 400 MG (241.3 MG ELEMENTAL MG) 400 MG: 400 (241.3 MG) TAB at 04:49

## 2019-06-13 RX ADMIN — METOPROLOL TARTRATE 25 MG: 25 TABLET, FILM COATED ORAL at 18:02

## 2019-06-13 RX ADMIN — RIVAROXABAN 20 MG: 20 TABLET, FILM COATED ORAL at 18:03

## 2019-06-13 RX ADMIN — VANCOMYCIN HYDROCHLORIDE 1200 MG: 100 INJECTION, POWDER, LYOPHILIZED, FOR SOLUTION INTRAVENOUS at 12:31

## 2019-06-13 RX ADMIN — ATORVASTATIN CALCIUM 10 MG: 10 TABLET, FILM COATED ORAL at 18:02

## 2019-06-13 RX ADMIN — AMLODIPINE BESYLATE 10 MG: 5 TABLET ORAL at 04:48

## 2019-06-13 RX ADMIN — CEFEPIME 2 G: 2 INJECTION, POWDER, FOR SOLUTION INTRAVENOUS at 18:08

## 2019-06-13 RX ADMIN — CINACALCET HYDROCHLORIDE 60 MG: 30 TABLET, COATED ORAL at 04:54

## 2019-06-13 RX ADMIN — OMEPRAZOLE 20 MG: 20 CAPSULE, DELAYED RELEASE ORAL at 04:49

## 2019-06-13 RX ADMIN — FLUCONAZOLE 800 MG: 200 TABLET ORAL at 04:47

## 2019-06-13 RX ADMIN — LOSARTAN POTASSIUM 50 MG: 50 TABLET ORAL at 04:49

## 2019-06-13 RX ADMIN — PRAMIPEXOLE DIHYDROCHLORIDE 1 MG: 0.5 TABLET ORAL at 18:02

## 2019-06-13 ASSESSMENT — ENCOUNTER SYMPTOMS
FEVER: 0
HEARTBURN: 0
HEADACHES: 0
BLURRED VISION: 0
DIZZINESS: 0
COUGH: 0
PHOTOPHOBIA: 0
HEMOPTYSIS: 0
CHILLS: 0
DIARRHEA: 0
DOUBLE VISION: 0
VOMITING: 0
NAUSEA: 0
ABDOMINAL PAIN: 0
SHORTNESS OF BREATH: 0

## 2019-06-13 NOTE — CARE PLAN
Problem: Infection  Goal: Will remain free from infection  Outcome: PROGRESSING AS EXPECTED  Patient educated regarding infection control including hand hygiene, personal cares. Patient educated regarding s/s of infection including pain, fever, heat and redness at incision site. Patient encouraged to discuss any concerns with RN or care team. Patient verbalized understanding.       Problem: Pain Management  Goal: Pain level will decrease to patient's comfort goal  Outcome: PROGRESSING SLOWER THAN EXPECTED  Patient educated regarding pharmacological and non-pharmacological pain management. Review of PCA for pain management, medication schedule, pain scale, and reassessment. Patient verbalized understanding of education.

## 2019-06-13 NOTE — PROGRESS NOTES
Patient arrived to room S527-1 via bed escorted by PATRICK Barbosa from Tele 8. Patient arrived with PCA. Rate verified.

## 2019-06-13 NOTE — PROGRESS NOTES
"   Orthopaedic PA Progress Note    Interval changes:Resting comfortably Tele 8th floor.    ROS - Patient denies any new issues. No chest pain, dyspnea, or fever.  Pain well controlled.    /82   Pulse 99   Temp 36.7 °C (98.1 °F) (Temporal)   Resp 20   Ht 1.575 m (5' 2\")   Wt 55 kg (121 lb 4.1 oz)   SpO2 96%     Patient seen and examined  No acute distress  Breathing non labored  RRR  Surgical dressing is clean, dry, and intact. Patient clearly fires tibialis anterior, EHL, and gastrocnemius/soleus. Sensation is intact to light touch throughout superficial peroneal, deep peroneal, tibial, saphenous, and sural nerve distributions. Strong and palpable 2+ dorsalis pedis and posterior tibial pulses with capillary refill less than 2 seconds. No lower leg tenderness or discomfort.    Recent Labs      06/10/19   0011  06/11/19   0055  06/12/19   0241   WBC  5.3  9.0  7.3   RBC  3.71*  4.11*  4.23   HEMOGLOBIN  10.6*  11.4*  11.8*   HEMATOCRIT  32.4*  35.4*  37.2   MCV  87.3  86.1  87.9   MCH  28.6  27.7  27.9   MCHC  32.7*  32.2*  31.7*   RDW  56.9*  58.0*  59.9*   PLATELETCT  218  261  307   MPV  9.7  9.9  9.2     Active Hospital Problems    Diagnosis   • Sepsis (HCC) [A41.9]     Priority: High   • Abscess of right iliac muscle and right gluteus medius muscle [L02.91]     Priority: High   • Hypomagnesemia [E83.42]     Priority: Medium   • Chronic pain [G89.29]     Priority: Medium   • RLS (restless legs syndrome) [G25.81]     Priority: Medium   • Hypercalcemia [E83.52]     Priority: Low   • Essential hypertension [I10]     Priority: Low   • Chronic hyponatremia [E87.1]     Priority: Low   • History of breast cancer [Z85.3]     Priority: Low   • COPD (chronic obstructive pulmonary disease) (HCC) [J44.9]     Priority: Low   • Malnutrition (HCC) [E46]   • History of DVT (deep vein thrombosis) [Z86.718]     Assessment/Plan:  Cleared for DC by ortho pending medicine and ID team clearance  POD#7 S/P Hardware removal, " pelvis  Wt bearing status - WBAT  Wound care/Drains - dressing changes every other day by nursing  Future Procedures - none planned   Lovenox: Start 6/6, Duration-until ambulatory > 150'  Sutures/Staples out- 10-14 days post operatively  PT/OT-initiated  Antibiotics: maxipime 2g IV BID, AF: Diflucan  DVT Prophylaxis- TEDS/SCDs/Foot pumps/Xarelto  Sue-none  Case Coordination for Discharge Planning - Disposition pending abx needs

## 2019-06-13 NOTE — CARE PLAN
Problem: Safety  Goal: Will remain free from injury  Outcome: PROGRESSING AS EXPECTED  HD Fall Risk complete; patient educated on level of risk and proper fall identifiers in place.     Problem: Infection  Goal: Will remain free from infection  Outcome: PROGRESSING AS EXPECTED  Patient educated on infection prevention measures; Sani-hands provided.

## 2019-06-13 NOTE — PROGRESS NOTES
Hospital Medicine Daily Progress Note    Date of Service  6/13/2019    Chief Complaint  68 y.o. female admitted 5/29/2019 with  persistent abscess.    Hospital Course     Ms. Martini is a 68-year-old female who was transferred from [facility] on 5/29/2019 with persistent right gluteal abscesses since sacral fracture repair in December.  She has had multiple IR drainage.  She initially presented with pelvic pain, low back pain, and confusion. She also had new slurred speech. Imaging of the abdomen, pelvis and brain revealed abscesses and she was treated with a full course of IV antibiotics per ID.  Her slurred speech improved and she was able to ambulate with physical therapy. However, repeat imaging showed persistent pelvic abscesses.  MRI brain shows increase in brain lesions.  Her brain lesions are known from prior imaging and there is concern they represent metastasis.  Oncology aware and do not recommend further imaging. Cultures remained negative and a CHAS done by Dr. Potter showed no vegetations. The size of the pelvic abscesses was reviewed by interventional radiology and the case was discussed with Dr. Finley who recommended transfer to [facility] for CT guided drainage.  S/P drainage on 5/30. On 6/4, febrile again, so restarted cefepime and vancomycin per ID and has been on them since.  Right hip hardware removal 6/5. MRI brain repeat 6/7/19 saw progression of the supra and infratentorial intra-axial nodules with edema, prompting a Neurosurgery consult.  Dr. Nguyen stated lesions not amendable to bx stereotactically, favors infectious, recs abx, antineuroleptic and repeat brain MRI in 2 weeks (around 6/21).  CT pelvis 6/9 with residual fluid collection 2.5 x 1.8 cm in right iliac m., slightly smaller than before.  Cefepime + vanc continue. Now broaden work up to include fungal and empiric started fluconazole 6/9.        Interval Problem Update  Patient is resting in bed, no new complaints, continue complaining of left  hip pain but is stable with pain medication, denies any fever chills, she is oriented x3 denies any dizziness or lightheadedness.    Consultants/Specialty  Infectious disease  Orthopedic surgery  Neurosurgery, Dr. Nguyen    Code Status  DNR/DNI    Disposition  pending    Review of Systems  Review of Systems   Constitutional: Negative for chills and fever.   HENT: Negative for ear discharge and tinnitus.    Eyes: Negative for blurred vision, double vision and photophobia.   Respiratory: Negative for cough and hemoptysis.    Cardiovascular: Negative for chest pain.   Gastrointestinal: Negative for heartburn.   Genitourinary: Negative for dysuria.   Musculoskeletal: Positive for joint pain (hip pain).   Skin: Negative for itching.   Neurological: Negative for headaches.        Physical Exam  Temp:  [36.5 °C (97.7 °F)-36.9 °C (98.5 °F)] 36.8 °C (98.2 °F)  Pulse:  [78-99] 78  Resp:  [17-20] 17  BP: (126-160)/(70-84) 126/70  SpO2:  [90 %-98 %] 97 %    Physical Exam   Constitutional: She is oriented to person, place, and time. She appears well-nourished. No distress.   HENT:   Head: Normocephalic and atraumatic.   Eyes: Conjunctivae are normal. No scleral icterus.   Neck: Normal range of motion. Neck supple.   Cardiovascular: Normal rate and regular rhythm.    Pulmonary/Chest: Effort normal and breath sounds normal. No respiratory distress.   Abdominal: Soft. Bowel sounds are normal. She exhibits no distension.   Musculoskeletal: She exhibits tenderness (hips left more than right).   Lymphadenopathy:     She has no cervical adenopathy.   Neurological: She is alert and oriented to person, place, and time. She exhibits normal muscle tone.   Skin: Skin is warm and dry.   Psychiatric: She has a normal mood and affect.       Fluids    Intake/Output Summary (Last 24 hours) at 06/13/19 1255  Last data filed at 06/13/19 0941   Gross per 24 hour   Intake           812.55 ml   Output                0 ml   Net           812.55 ml        Laboratory  Recent Labs      06/11/19   0055  06/12/19   0241  06/13/19   0057   WBC  9.0  7.3  9.1   RBC  4.11*  4.23  4.39   HEMOGLOBIN  11.4*  11.8*  12.3   HEMATOCRIT  35.4*  37.2  39.9   MCV  86.1  87.9  90.9   MCH  27.7  27.9  28.0   MCHC  32.2*  31.7*  30.8*   RDW  58.0*  59.9*  61.0*   PLATELETCT  261  307  373   MPV  9.9  9.2  9.4     Recent Labs      06/11/19   0055  06/12/19   0241  06/13/19   0057   SODIUM  134*  133*  134*   POTASSIUM  4.8  4.1  4.0   CHLORIDE  103  100  99   CO2  23  24  25   GLUCOSE  100*  93  84   BUN  9  9  11   CREATININE  0.49*  0.37*  0.46*   CALCIUM  7.9*  8.9  9.4                   Imaging    Assessment/Plan  * Abscess of right iliac muscle and right gluteus medius muscle- (present on admission)   Assessment & Plan    Recurrent issue since sacral fx repair in December 2018. Has had multiple IR drainage and completed antibx.  - S/P CT guided abscess drainage on 5/30; 2 mL removed  - hardware removal on 6/5  MRI on 6/7 with interval progression of supra and infratentorial intra--axial nodules and associated edema.  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  Probably from her retained hip hardware.  Blood cultures negative  Sepsis is resolved  Continue on Cefepime, Vancomycin, Diflucan as per ID         Hypomagnesemia- (present on admission)   Assessment & Plan    We will repeat mag levels     RLS (restless legs syndrome)- (present on admission)   Assessment & Plan    Continue on pramipexole.  Tolerating well     Chronic pain- (present on admission)   Assessment & Plan    Primarily located at  left hip.  On PCA pump, try to wean her off.     Malnutrition (HCC)   Assessment & Plan    Moderate       History of DVT (deep vein thrombosis)- (present on admission)   Assessment & Plan    On xarelto       Hypercalcemia- (present on admission)   Assessment & Plan    Resolved     Essential hypertension- (present on admission)   Assessment & Plan      Continue on losartan, metoprolol, and amlodipine.      Chronic hyponatremia- (present on admission)   Assessment & Plan    Stable continue monitoring.     History of breast cancer- (present on admission)   Assessment & Plan    S/p left mastectomy and breast implant  Needs follow-up as outpatient     COPD (chronic obstructive pulmonary disease) (HCC)- (present on admission)   Assessment & Plan    Not on any exacerbation at this time, continue RT per protocol          VTE prophylaxis: xarelto

## 2019-06-13 NOTE — PROGRESS NOTES
Infectious Disease Progress Note    Author: Ashely Hinojosa M.D. Date & Time of service: 2019  11:50 AM    Chief Complaint:  Brain abscess, iliopsoas abscess, leukopenia      Interval History:  68 y.o. female admitted 2019 as a transfer from [facility] since 2019. + diabetes, SANTOSH of right hip with hardware, and breast cancer who was originally admitted to [facility] on 2019 for right hip and pelvic pain.  Work-up revealed a right iliopsoas lesion, gluteal abscess, and mult brain abscesses  2019-no fevers.  Continues to complain of significant pain in her hips.  Pain medications are being adjusted.  2019-no fevers.  Continues to remain off the antibiotics.  Awaiting Ortho evaluation  2019 patient febrile up to 103.  Having shaking chills.  Is not feeling well.  Denies any specific complaints.  6/10 AF WBC 5.3 somnolent No fever or new complaints   AF WBC 9 No fever or chills-some pain at surgical site. Limited participation with PT noted   AF WBC 7.3 somnolent but arousable-no new complaints   afebrile WBC 9.1 patient complaining of left hip pain.  Denies any headaches    Labs Reviewed, Medications Reviewed, and Wound Reviewed.    Review of Systems:  Review of Systems   Constitutional: Negative for chills and fever.   Respiratory: Negative for cough and shortness of breath.    Gastrointestinal: Negative for abdominal pain, diarrhea, nausea and vomiting.   Musculoskeletal: Positive for joint pain.        Left hip   Neurological: Negative for dizziness and headaches.   All other systems reviewed and are negative.      Hemodynamics:  Temp (24hrs), Av.7 °C (98.1 °F), Min:36.5 °C (97.7 °F), Max:36.9 °C (98.5 °F)  Temperature: 36.8 °C (98.2 °F)  Pulse  Av.1  Min: 60  Max: 125  Blood Pressure : 126/70       Physical Exam:  Physical Exam   Constitutional: No distress.   HENT:   Mouth/Throat: No oropharyngeal exudate.   Eyes: Conjunctivae are normal. No scleral icterus.    Neck: Neck supple. No JVD present.   Cardiovascular: Normal rate and regular rhythm.    No murmur heard.  Pulmonary/Chest: Effort normal and breath sounds normal. No stridor. She has no wheezes. She has no rales.   Abdominal: Soft. She exhibits no distension. There is no tenderness.   Musculoskeletal: She exhibits tenderness. She exhibits no edema.   Right hip with ice pack.  No surrounding erythema   Lymphadenopathy:     She has no cervical adenopathy.   Neurological: She is alert.   Skin: Skin is warm. No rash noted. She is not diaphoretic.   Nursing note and vitals reviewed.      Meds:    Current Facility-Administered Medications:   •  vancomycin  •  rivaroxaban  •  Pharmacy Consult Request  •  HYDROmorphone  •  magnesium oxide  •  fluconazole  •  MD Alert...Vancomycin per Pharmacy  •  cefepime  •  acetaminophen  •  LORazepam  •  insulin regular **AND** Accu-Chek ACHS **AND** NOTIFY MD and PharmD **AND** glucose **AND** dextrose 10% bolus  •  NS  •  pramipexole  •  lidocaine  •  temazepam  •  cyclobenzaprine  •  Respiratory Care per Protocol  •  amLODIPine  •  cinacalcet  •  losartan  •  acetaminophen  •  atorvastatin  •  omeprazole  •  metoprolol  •  senna-docusate **AND** polyethylene glycol/lytes **AND** magnesium hydroxide **AND** bisacodyl  •  ondansetron  •  ondansetron    Labs:  Recent Labs      06/11/19 0055 06/12/19 0241 06/13/19 0057   WBC  9.0  7.3  9.1   RBC  4.11*  4.23  4.39   HEMOGLOBIN  11.4*  11.8*  12.3   HEMATOCRIT  35.4*  37.2  39.9   MCV  86.1  87.9  90.9   MCH  27.7  27.9  28.0   RDW  58.0*  59.9*  61.0*   PLATELETCT  261  307  373   MPV  9.9  9.2  9.4   NEUTSPOLYS  65.10  64.40  69.20   LYMPHOCYTES  17.30*  14.80*  12.50*   MONOCYTES  11.90  12.50  11.10   EOSINOPHILS  4.00  6.60  5.60   BASOPHILS  0.90  1.20  1.10     Recent Labs      06/11/19 0055 06/12/19 0241 06/13/19   0057   SODIUM  134*  133*  134*   POTASSIUM  4.8  4.1  4.0   CHLORIDE  103  100  99   CO2  23  24  25  "  GLUCOSE  100*  93  84   BUN  9  9  11     Recent Labs      06/11/19   0055  06/12/19   0241  06/13/19   0057   ALBUMIN   --    --   3.7   TBILIRUBIN   --    --   0.5   ALKPHOSPHAT   --    --   339*   TOTPROTEIN   --    --   7.9   ALTSGPT   --    --   12   ASTSGOT   --    --   32   CREATININE  0.49*  0.37*  0.46*       Imaging:  MRI of the brain on 6/8/2019  Impression       1.  Interval progression of supra and infratentorial intra-axial nodules and associated edema. This short-term interval progression favors infection over neoplasm though both remain considerations.  2.  Mild atrophy         Micro:  Results     Procedure Component Value Units Date/Time    URINALYSIS [[id number]]  (Abnormal) Collected:  06/10/19 1010    Order Status:  Completed Specimen:  Urine from Urine, Clean Catch Updated:  06/10/19 1053     Color Yellow     Character Clear     Specific Gravity 1.009     Ph 7.0     Glucose Negative mg/dL      Ketones Negative mg/dL      Protein Negative mg/dL      Bilirubin Negative     Urobilinogen, Urine 0.2     Nitrite Negative     Leukocyte Esterase Trace (A)     Occult Blood Negative     Micro Urine Req Microscopic    Narrative:       Collected By:[id number] SANAM MICHAELS    BLOOD CULTURE [[id number]] Collected:  06/04/19 1512    Order Status:  Completed Specimen:  Blood from Peripheral Updated:  06/09/19 1700     Significant Indicator NEG     Source BLD     Site PERIPHERAL     Culture Result No growth after 5 days of incubation.    Narrative:       Per Hospital Policy: Only change Specimen Src: to \"Line\" if  specified by physician order.  Left Hand    BLOOD CULTURE [[id number]] Collected:  06/04/19 1512    Order Status:  Completed Specimen:  Blood from Peripheral Updated:  06/09/19 1700     Significant Indicator NEG     Source BLD     Site PERIPHERAL     Culture Result No growth after 5 days of incubation.    Narrative:       Per Hospital Policy: Only change Specimen Src: to \"Line\" if  specified by " "physician order.  Left Forearm/Arm    Cryptococcal Antigen [[id number]]     Order Status:  Canceled Specimen:  CSF     Cryptococcal Antigen [[id number]] Collected:  06/09/19 0000    Order Status:  Canceled Specimen:  Blood     Cryptococcal Antigen [[id number]] Collected:  06/09/19 0000    Order Status:  Canceled Specimen:  Blood     Cryptococcal Antigen Titer [[id number]]     Order Status:  Canceled Specimen:  Blood from Blood     Cryptococcal Antigen [[id number]]     Order Status:  Canceled Specimen:  Blood     C Diff by PCR rflx Toxin [[id number]] Collected:  06/06/19 0900    Order Status:  Completed Specimen:  Stool from Stool Updated:  06/06/19 1541     C Diff by PCR Negative     Comment: C. difficile NOT detected by PCR.  Treatment not indicated per guidelines.  Repeat testing not indicated within 7 days.          027-NAP1-BI Presumptive Negative     Comment: Presumptive 027/NAP1/BI target DNA sequences are NOT DETECTED.       Narrative:       Special Contact [id number] MERYL PICKENS  Does this patient have risk factors for C-diff?->Yes          Assessment:  Gluteal and iliopsoas abscess  Brain abscesses vs mets  Breast cancer  DM2    Plan:  Brain abscesses vs mets.  Persistent with interval progression on last MRI  MRI 4/23 \"supra and infratentorial brain parenchyma. Decrease in the size of the lesions. Interval reduction in the extent of the surrounding white matter edema consistent with improvement/treatment response of the most of the multifocal brain abscesses.  MRI 5/21 showed innumerable microabscesses vs mets  MRI on 6/8 with interval progression of supra and infratentorial intra--axial nodules and associated edema.  New right thalamus lesion. 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  Resolved  Fever and chills 6/4  Remains afebrile  Panculture neg  1, 3 beta glucan neg  QuantiGold+. CXR neg  S/p removal hardware 6/5/19    Gluteal and iliopsoas abscess s/p treatment.   Adjacent to hardware in right hip (placed 12/17/18)  CT 4/23 \"Fluid collections within the right gluteal and iliacis muscles.. The collection within the right gluteal muscle has unchanged while the fluid collection within the right iliacis muscle is increased somewhat in size.\" 2.7 cm  Cultures 3/29 and 4/4 neg  MRI on 5/20/2019 - interval decrease in collection in R gluteal muscle and persistent multiloculated collection in R iliacus muscle.   CT 5/28 no change  s/p drainage on 5/30/2019-cultures negative  S/p removal hardware 6/5-no cultures done    Type 2 DM  Hemoglobin A1c 6.3   Keep BS under 150 to help control current infection            "

## 2019-06-13 NOTE — PROGRESS NOTES
"Pharmacy Kinetics 68 y.o. female on vancomycin day # 10  6/13/2019    Currently Dose: Vancomycin 600 mg iv q12hr  Received Load Dose: Yes    Indication for Treatment: brain abscess + iliopsoas abscess  ID Service Following: Yes    Pertinent history per medical record: Admitted on 5/29/2019 for pelvic abscess (LTACH transfer) after recent admission at [facility] ([place], [place]). Concerns for brain and iliopsoas abscess. NSG and ID consulted (I&D per IR 5/30/19; hardware removal 6/5/19).     Other antibiotics: cefepime 2 gm iv q12h, fluconazole 800 mg po qd    Allergies: Patient has no known allergies.     List concerns for accumulation of vancomycin: age 68, low body weight, abnormal LFT's, BUN:SCr ~20:1    Pertinent cultures to date:   Results     Procedure Component Value Units Date/Time    URINALYSIS [[id number]]  (Abnormal) Collected:  06/10/19 1010    Order Status:  Completed Specimen:  Urine from Urine, Clean Catch Updated:  06/10/19 1053     Color Yellow     Character Clear     Specific Gravity 1.009     Ph 7.0     Glucose Negative mg/dL      Ketones Negative mg/dL      Protein Negative mg/dL      Bilirubin Negative     Urobilinogen, Urine 0.2     Nitrite Negative     Leukocyte Esterase Trace (A)     Occult Blood Negative     Micro Urine Req Microscopic    Narrative:       Collected By:[id number] SANAM MICHAELS    BLOOD CULTURE [[id number]] Collected:  06/04/19 1512    Order Status:  Completed Specimen:  Blood from Peripheral Updated:  06/09/19 1700     Significant Indicator NEG     Source BLD     Site PERIPHERAL     Culture Result No growth after 5 days of incubation.    Narrative:       Per Hospital Policy: Only change Specimen Src: to \"Line\" if  specified by physician order.  Left Hand    BLOOD CULTURE [[id number]] Collected:  06/04/19 1512    Order Status:  Completed Specimen:  Blood from Peripheral Updated:  06/09/19 1700     Significant Indicator NEG     Source BLD     Site PERIPHERAL     Culture Result No growth after " "5 days of incubation.    Narrative:       Per Hospital Policy: Only change Specimen Src: to \"Line\" if  specified by physician order.  Left Forearm/Arm    Cryptococcal Antigen [[id number]]     Order Status:  Canceled Specimen:  CSF     Cryptococcal Antigen [[id number]] Collected:  06/09/19 0000    Order Status:  Canceled Specimen:  Blood     Cryptococcal Antigen [[id number]] Collected:  06/09/19 0000    Order Status:  Canceled Specimen:  Blood     Cryptococcal Antigen Titer [[id number]]     Order Status:  Canceled Specimen:  Blood from Blood     Cryptococcal Antigen [[id number]]     Order Status:  Canceled Specimen:  Blood     C Diff by PCR rflx Toxin [[id number]] Collected:  06/06/19 0900    Order Status:  Completed Specimen:  Stool from Stool Updated:  06/06/19 1541     C Diff by PCR Negative     Comment: C. difficile NOT detected by PCR.  Treatment not indicated per guidelines.  Repeat testing not indicated within 7 days.          027-NAP1-BI Presumptive Negative     Comment: Presumptive 027/NAP1/BI target DNA sequences are NOT DETECTED.       Narrative:       Special Contact [id number] MERYL PICKENS  Does this patient have risk factors for C-diff?->Yes        MRSA nares swab if pneumonia is a concern (ordered/positive/negative/n-a): n/a    Recent Labs      06/11/19   0055  06/12/19   0241  06/13/19   0057   WBC  9.0  7.3  9.1   NEUTSPOLYS  65.10  64.40  69.20     Recent Labs      06/11/19   0055  06/12/19   0241  06/13/19   0057   BUN  9  9  11   CREATININE  0.49*  0.37*  0.46*   ALBUMIN   --    --   3.7     Recent Labs      06/10/19   1122  06/12/19   1135   VANCOTROUGH  21.6*  47.1*     Intake/Output Summary (Last 24 hours) at 06/13/19 0724  Last data filed at 06/13/19 0705   Gross per 24 hour   Intake           334.55 ml   Output              250 ml   Net            84.55 ml      /74   Pulse 88   Temp 36.7 °C (98.1 °F) (Temporal)   Resp 17   Ht 1.575 m (5' 2\")   Wt 55 kg (121 lb 4.1 oz)   SpO2 " 95%  Temp (24hrs), Av.7 °C (98 °F), Min:36.3 °C (97.4 °F), Max:36.9 °C (98.5 °F)    Estimated Creatinine Clearance: 90 mL/min (A) (by C-G formula based on SCr of 0.46 mg/dL (L)).    A/P   1. Vancomycin dose change: 1200 mg iv q24h (~21 mg/kg)   2. Next vancomycin level: 19 @1230 (ordered)  3. Goal trough: 18-22 mcg/mL  4. Comments: VS stable. Afebrile. WBC stable. No new microbiology. CrCl ~90 mL/min. Concerns for accumulation of vancomycin noted. Variable vancomycin level results with both daily and twice daily dosing regimens. Concern that patient will not tolerate extended vancomycin exposure at twice daily dosing. Dose adjusted via linear kinetics to maximum dose per protocol (~21 mg/kg). Likely to repeat vancomycin level in ~2-3 days. BMP with AM labs. Pharmacy will continue to follow.    Kulwant Adhikari, PharmD

## 2019-06-13 NOTE — PROGRESS NOTES
Patient's pain managed with PCA, needs reinforcement of education. Patient is having brief moments of disorientation, disorganized thought, and restlessness but is easily redirected. Patient is able to answer simple questions but is disoriented to time intermittently. Patient has been disrobing through this shift though not pulling at lines. Neurological status reassessed.

## 2019-06-13 NOTE — PROGRESS NOTES
Received bedside report from NOC RN. Assumed care at 0700. Patient resting comfortably in bed, no s/s of distress at this time. Patient able to make needs known and denies any at this time. Bed alarm on, fall precautions implemented.

## 2019-06-14 LAB
ANION GAP SERPL CALC-SCNC: 8 MMOL/L (ref 0–11.9)
BUN SERPL-MCNC: 10 MG/DL (ref 8–22)
CALCIUM SERPL-MCNC: 9.1 MG/DL (ref 8.5–10.5)
CHLORIDE SERPL-SCNC: 99 MMOL/L (ref 96–112)
CO2 SERPL-SCNC: 25 MMOL/L (ref 20–33)
CREAT SERPL-MCNC: 0.46 MG/DL (ref 0.5–1.4)
GLUCOSE BLD-MCNC: 105 MG/DL (ref 65–99)
GLUCOSE BLD-MCNC: 106 MG/DL (ref 65–99)
GLUCOSE BLD-MCNC: 79 MG/DL (ref 65–99)
GLUCOSE BLD-MCNC: 85 MG/DL (ref 65–99)
GLUCOSE SERPL-MCNC: 80 MG/DL (ref 65–99)
MAGNESIUM SERPL-MCNC: 1.3 MG/DL (ref 1.5–2.5)
POTASSIUM SERPL-SCNC: 3.3 MMOL/L (ref 3.6–5.5)
SODIUM SERPL-SCNC: 132 MMOL/L (ref 135–145)

## 2019-06-14 PROCEDURE — 700105 HCHG RX REV CODE 258: Performed by: HOSPITALIST

## 2019-06-14 PROCEDURE — A9270 NON-COVERED ITEM OR SERVICE: HCPCS | Performed by: INTERNAL MEDICINE

## 2019-06-14 PROCEDURE — 700102 HCHG RX REV CODE 250 W/ 637 OVERRIDE(OP): Performed by: HOSPITALIST

## 2019-06-14 PROCEDURE — 700102 HCHG RX REV CODE 250 W/ 637 OVERRIDE(OP): Performed by: INTERNAL MEDICINE

## 2019-06-14 PROCEDURE — 99232 SBSQ HOSP IP/OBS MODERATE 35: CPT | Performed by: HOSPITALIST

## 2019-06-14 PROCEDURE — 99233 SBSQ HOSP IP/OBS HIGH 50: CPT | Performed by: INTERNAL MEDICINE

## 2019-06-14 PROCEDURE — 770006 HCHG ROOM/CARE - MED/SURG/GYN SEMI*

## 2019-06-14 PROCEDURE — 700105 HCHG RX REV CODE 258: Performed by: INTERNAL MEDICINE

## 2019-06-14 PROCEDURE — 82962 GLUCOSE BLOOD TEST: CPT | Mod: 91

## 2019-06-14 PROCEDURE — A9270 NON-COVERED ITEM OR SERVICE: HCPCS | Performed by: NURSE PRACTITIONER

## 2019-06-14 PROCEDURE — 700102 HCHG RX REV CODE 250 W/ 637 OVERRIDE(OP): Performed by: FAMILY MEDICINE

## 2019-06-14 PROCEDURE — 700111 HCHG RX REV CODE 636 W/ 250 OVERRIDE (IP): Performed by: HOSPITALIST

## 2019-06-14 PROCEDURE — 700111 HCHG RX REV CODE 636 W/ 250 OVERRIDE (IP): Performed by: INTERNAL MEDICINE

## 2019-06-14 PROCEDURE — 83735 ASSAY OF MAGNESIUM: CPT

## 2019-06-14 PROCEDURE — 700102 HCHG RX REV CODE 250 W/ 637 OVERRIDE(OP): Performed by: NURSE PRACTITIONER

## 2019-06-14 PROCEDURE — 80048 BASIC METABOLIC PNL TOTAL CA: CPT

## 2019-06-14 PROCEDURE — 97535 SELF CARE MNGMENT TRAINING: CPT

## 2019-06-14 PROCEDURE — A9270 NON-COVERED ITEM OR SERVICE: HCPCS | Performed by: HOSPITALIST

## 2019-06-14 PROCEDURE — 36415 COLL VENOUS BLD VENIPUNCTURE: CPT

## 2019-06-14 PROCEDURE — 700101 HCHG RX REV CODE 250: Performed by: NURSE PRACTITIONER

## 2019-06-14 PROCEDURE — A9270 NON-COVERED ITEM OR SERVICE: HCPCS | Performed by: FAMILY MEDICINE

## 2019-06-14 RX ORDER — POTASSIUM CHLORIDE 20 MEQ/1
40 TABLET, EXTENDED RELEASE ORAL ONCE
Status: COMPLETED | OUTPATIENT
Start: 2019-06-14 | End: 2019-06-14

## 2019-06-14 RX ORDER — MAGNESIUM SULFATE HEPTAHYDRATE 40 MG/ML
2 INJECTION, SOLUTION INTRAVENOUS ONCE
Status: COMPLETED | OUTPATIENT
Start: 2019-06-14 | End: 2019-06-14

## 2019-06-14 RX ADMIN — ATORVASTATIN CALCIUM 10 MG: 10 TABLET, FILM COATED ORAL at 17:47

## 2019-06-14 RX ADMIN — PRAMIPEXOLE DIHYDROCHLORIDE 1 MG: 0.5 TABLET ORAL at 17:47

## 2019-06-14 RX ADMIN — LOSARTAN POTASSIUM 50 MG: 50 TABLET ORAL at 04:56

## 2019-06-14 RX ADMIN — PRAMIPEXOLE DIHYDROCHLORIDE 1 MG: 0.5 TABLET ORAL at 12:13

## 2019-06-14 RX ADMIN — FLUCONAZOLE 800 MG: 200 TABLET ORAL at 04:56

## 2019-06-14 RX ADMIN — CEFEPIME 2 G: 2 INJECTION, POWDER, FOR SOLUTION INTRAVENOUS at 17:45

## 2019-06-14 RX ADMIN — CEFEPIME 2 G: 2 INJECTION, POWDER, FOR SOLUTION INTRAVENOUS at 04:56

## 2019-06-14 RX ADMIN — RIVAROXABAN 20 MG: 20 TABLET, FILM COATED ORAL at 17:47

## 2019-06-14 RX ADMIN — OMEPRAZOLE 20 MG: 20 CAPSULE, DELAYED RELEASE ORAL at 04:56

## 2019-06-14 RX ADMIN — HYDROMORPHONE HYDROCHLORIDE: 10 INJECTION, SOLUTION INTRAMUSCULAR; INTRAVENOUS; SUBCUTANEOUS at 10:33

## 2019-06-14 RX ADMIN — ACETAMINOPHEN 1000 MG: 500 TABLET ORAL at 04:56

## 2019-06-14 RX ADMIN — LIDOCAINE 2 PATCH: 50 PATCH CUTANEOUS at 10:27

## 2019-06-14 RX ADMIN — MAGNESIUM SULFATE 2 G: 2 INJECTION INTRAVENOUS at 10:27

## 2019-06-14 RX ADMIN — METOPROLOL TARTRATE 25 MG: 25 TABLET, FILM COATED ORAL at 17:48

## 2019-06-14 RX ADMIN — CYCLOBENZAPRINE 10 MG: 10 TABLET, FILM COATED ORAL at 12:13

## 2019-06-14 RX ADMIN — SENNOSIDES AND DOCUSATE SODIUM 2 TABLET: 8.6; 5 TABLET ORAL at 04:56

## 2019-06-14 RX ADMIN — VANCOMYCIN HYDROCHLORIDE 1200 MG: 100 INJECTION, POWDER, LYOPHILIZED, FOR SOLUTION INTRAVENOUS at 12:13

## 2019-06-14 RX ADMIN — POTASSIUM CHLORIDE 40 MEQ: 1500 TABLET, EXTENDED RELEASE ORAL at 10:26

## 2019-06-14 RX ADMIN — AMLODIPINE BESYLATE 10 MG: 5 TABLET ORAL at 04:56

## 2019-06-14 RX ADMIN — METOPROLOL TARTRATE 25 MG: 25 TABLET, FILM COATED ORAL at 04:56

## 2019-06-14 RX ADMIN — CINACALCET HYDROCHLORIDE 60 MG: 30 TABLET, COATED ORAL at 04:56

## 2019-06-14 RX ADMIN — MAGNESIUM OXIDE TAB 400 MG (241.3 MG ELEMENTAL MG) 400 MG: 400 (241.3 MG) TAB at 04:56

## 2019-06-14 RX ADMIN — HYDROMORPHONE HYDROCHLORIDE: 10 INJECTION, SOLUTION INTRAMUSCULAR; INTRAVENOUS; SUBCUTANEOUS at 01:16

## 2019-06-14 RX ADMIN — PRAMIPEXOLE DIHYDROCHLORIDE 1 MG: 0.5 TABLET ORAL at 04:56

## 2019-06-14 RX ADMIN — ACETAMINOPHEN 1000 MG: 500 TABLET ORAL at 17:47

## 2019-06-14 ASSESSMENT — COGNITIVE AND FUNCTIONAL STATUS - GENERAL
DRESSING REGULAR UPPER BODY CLOTHING: A LITTLE
SUGGESTED CMS G CODE MODIFIER DAILY ACTIVITY: CK
EATING MEALS: A LITTLE
DRESSING REGULAR LOWER BODY CLOTHING: A LOT
HELP NEEDED FOR BATHING: A LOT
TOILETING: A LOT
DAILY ACTIVITIY SCORE: 15
PERSONAL GROOMING: A LITTLE

## 2019-06-14 ASSESSMENT — ENCOUNTER SYMPTOMS
BLURRED VISION: 0
NAUSEA: 0
HEMOPTYSIS: 0
HEARTBURN: 0
DIARRHEA: 0
COUGH: 0
DIZZINESS: 0
HEADACHES: 0
VOMITING: 0
ABDOMINAL PAIN: 0
SHORTNESS OF BREATH: 0
CHILLS: 0
FEVER: 0

## 2019-06-14 NOTE — PROGRESS NOTES
"Pharmacy Kinetics 68 y.o. female on vancomycin day # 11  6/14/2019    Currently Dose: Vancomycin 1200 mg iv q12hr (~21 mg/kg)  Received Load Dose: Yes     Indication for Treatment: brain abscess + iliopsoas abscess  ID Service Following: Yes     Pertinent history per medical record: Admitted on 5/29/2019 for pelvic abscess (LTACH transfer) after recent admission at [facility] ([place], [place]). Concerns for brain and iliopsoas abscess. NSG and ID consulted (I&D per IR 5/30/19; hardware removal 6/5/19).      Other antibiotics: cefepime 2 gm iv q12h, fluconazole 800 mg po qd     Allergies: Patient has no known allergies.      List concerns for accumulation of vancomycin: age 68, low body weight, abnormal LFT's, BUN:SCr ~20:1     Pertinent cultures to date:   Results     Procedure Component Value Units Date/Time    URINALYSIS [[id number]]  (Abnormal) Collected:  06/10/19 1010    Order Status:  Completed Specimen:  Urine from Urine, Clean Catch Updated:  06/10/19 1053     Color Yellow     Character Clear     Specific Gravity 1.009     Ph 7.0     Glucose Negative mg/dL      Ketones Negative mg/dL      Protein Negative mg/dL      Bilirubin Negative     Urobilinogen, Urine 0.2     Nitrite Negative     Leukocyte Esterase Trace (A)     Occult Blood Negative     Micro Urine Req Microscopic    Narrative:       Collected By:[id number] SANAM MICHAELS    BLOOD CULTURE [[id number]] Collected:  06/04/19 1512    Order Status:  Completed Specimen:  Blood from Peripheral Updated:  06/09/19 1700     Significant Indicator NEG     Source BLD     Site PERIPHERAL     Culture Result No growth after 5 days of incubation.    Narrative:       Per Hospital Policy: Only change Specimen Src: to \"Line\" if  specified by physician order.  Left Hand    BLOOD CULTURE [[id number]] Collected:  06/04/19 1512    Order Status:  Completed Specimen:  Blood from Peripheral Updated:  06/09/19 1700     Significant Indicator NEG     Source BLD     Site PERIPHERAL     Culture " "Result No growth after 5 days of incubation.    Narrative:       Per Hospital Policy: Only change Specimen Src: to \"Line\" if  specified by physician order.  Left Forearm/Arm    Cryptococcal Antigen [[id number]]     Order Status:  Canceled Specimen:  CSF     Cryptococcal Antigen [[id number]] Collected:  19 0000    Order Status:  Canceled Specimen:  Blood     Cryptococcal Antigen [[id number]] Collected:  19 0000    Order Status:  Canceled Specimen:  Blood     Cryptococcal Antigen Titer [[id number]]     Order Status:  Canceled Specimen:  Blood from Blood         MRSA nares swab if pneumonia is a concern (ordered/positive/negative/n-a): n/a    Recent Labs      19   02419   WBC  7.3  9.1   NEUTSPOLYS  64.40  69.20     Recent Labs      19   0241  19   0057  19   0252   BUN  9  11  10   CREATININE  0.37*  0.46*  0.46*   ALBUMIN   --   3.7   --      Recent Labs      19   1135  19   0812   [facility]  47.[facility]*  15.5     Intake/Output Summary (Last 24 hours) at 19 0726  Last data filed at 19 0658   Gross per 24 hour   Intake            851.4 ml   Output                0 ml   Net            851.4 ml      /57   Pulse 78   Temp 36.3 °C (97.3 °F) (Temporal)   Resp 17   Ht 1.575 m (5' 2\")   Wt 55 kg (121 lb 4.1 oz)   SpO2 95%  Temp (24hrs), Av.7 °C (98.1 °F), Min:36.3 °C (97.3 °F), Max:37.1 °C (98.7 °F)    Estimated Creatinine Clearance: 90 mL/min (A) (by C-G formula based on SCr of 0.46 mg/dL (L)).    A/P   1. Vancomycin dose change: not indicated   2. Next vancomycin level: 19 @1230 (ordered)  3. Goal trough: 18-22 mcg/mL  4. Comments: VS stable. Afebrile. No new WBC. CrCl ~90 mL/min (SCr stable). Continued concerns for accumulation of vancomycin. Trough in place prior to PM dose 19 to assess vancomycin clearance. BMP with AM labs. Pharmacy will continue to follow.    Kulwant Adhikari, PharmD  "

## 2019-06-14 NOTE — THERAPY
"Occupational Therapy Treatment completed with focus on ADLs, ADL transfers and patient education.  Functional Status: Pt seen today for OT treatment. She was pleasant and cooperative, motivated to try some OOB activity today. Francesca bed mobility. MaxA LB dressing. Francesca sit<>stand with FWW. Upon standing patient became very frantic and limited by pain. She was assisted back to bed as she reported she could not tolerate any further activity. She is limited by weakness, fatigue, impaired balance, and pain which impacts independence in self care and functional mobility.  Plan of Care: Will benefit from Occupational Therapy 3 times per week  Discharge Recommendations:  Equipment Will Continue to Assess for Equipment Needs. Recommend post-acute placement for additional occupational therapy services prior to discharge home. Patient can tolerate post-acute therapies at a 5x/week frequency.      See \"Rehab Therapy-Acute\" Patient Summary Report for complete documentation.   "

## 2019-06-14 NOTE — PROGRESS NOTES
"Assumed care at 1900. Received report from RN. Patient is a/o x4... but exhibits some confusion at times.  VSS on ra, up with 1 assist.  Denies pain or SOB and in NAD  ACHS accuchecks.  Q4 neuro checks and vitals.  Dilaudid PCA. Assessment complete. Labs reviewed. Patient and RN discussed plan of care. Patient questions answered. Patient needs are met at this time. Bed in lowest and locked position. Call light is within reach. Hourly rounding in place. /56   Pulse 91   Temp 36.8 °C (98.2 °F) (Temporal)   Resp 16   Ht 1.575 m (5' 2\")   Wt 55 kg (121 lb 4.1 oz)   SpO2 93%   Breastfeeding? No   BMI 22.18 kg/m²   "

## 2019-06-14 NOTE — CARE PLAN
Problem: Safety  Goal: Will remain free from falls  Outcome: PROGRESSING AS EXPECTED  All fall precautions in place. Pt does not attempt to climb out of bed, pt calls appropriately for assistance using call light    Problem: Skin Integrity  Goal: Risk for impaired skin integrity will decrease  Outcome: PROGRESSING AS EXPECTED  Pt turns herself side to side. Pt able to tell when incontinence episodes occur so prompt cleaning can occur. Skin kept clean and dry

## 2019-06-14 NOTE — CARE PLAN
Problem: Safety  Goal: Will remain free from injury  Outcome: PROGRESSING AS EXPECTED  Fall precautions in place, bed alarm on, bed locked in lowest position, non-slip socks, call light in reach    Problem: Infection  Goal: Will remain free from infection  Outcome: PROGRESSING AS EXPECTED  IV abx, monitor labs and s/sx

## 2019-06-14 NOTE — THERAPY
Physical Therapy Contact Note    PT treatment attempted. Patient refused OOB activity due to pain, reported OOB activity with OT earlier in day. Will re attempt as appropriate and able.    Steph Zapata, PT, DPT  [phone]

## 2019-06-14 NOTE — PROGRESS NOTES
Hospital Medicine Daily Progress Note    Date of Service  6/14/2019    Chief Complaint  68 y.o. female admitted 5/29/2019 with  persistent abscess.    Hospital Course     Ms. Martini is a 68-year-old female who was transferred from [facility] on 5/29/2019 with persistent right gluteal abscesses since sacral fracture repair in December.  She has had multiple IR drainage.  She initially presented with pelvic pain, low back pain, and confusion. She also had new slurred speech. Imaging of the abdomen, pelvis and brain revealed abscesses and she was treated with a full course of IV antibiotics per ID.  Her slurred speech improved and she was able to ambulate with physical therapy. However, repeat imaging showed persistent pelvic abscesses.  MRI brain shows increase in brain lesions.  Her brain lesions are known from prior imaging and there is concern they represent metastasis.  Oncology aware and do not recommend further imaging. Cultures remained negative and a CHAS done by Dr. Potter showed no vegetations. The size of the pelvic abscesses was reviewed by interventional radiology and the case was discussed with Dr. Finley who recommended transfer to [facility] for CT guided drainage.  S/P drainage on 5/30. On 6/4, febrile again, so restarted cefepime and vancomycin per ID and has been on them since.  Right hip hardware removal 6/5. MRI brain repeat 6/7/19 saw progression of the supra and infratentorial intra-axial nodules with edema, prompting a Neurosurgery consult.  Dr. Nguyen stated lesions not amendable to bx stereotactically, favors infectious, recs abx, antineuroleptic and repeat brain MRI in 2 weeks (around 6/21).  CT pelvis 6/9 with residual fluid collection 2.5 x 1.8 cm in right iliac m., slightly smaller than before.  Cefepime + vanc continue. Now broaden work up to include fungal and empiric started fluconazole 6/9.        Interval Problem Update  Patient is resting in bed, states that pain needs better she states that  when she is landing in bed she can no without pain, denies any fever chills shortness of breath, plan to decrease PCA pump dose today and possible DC tomorrow and start oral pain medications patient agreed with plan.    Consultants/Specialty  Infectious disease  Orthopedic surgery  Neurosurgery, Dr. Nguyen    Code Status  DNR/DNI    Disposition  pending    Review of Systems  Review of Systems   Constitutional: Negative for chills and fever.   HENT: Negative for ear discharge and tinnitus.    Eyes: Negative for blurred vision.   Respiratory: Negative for cough and hemoptysis.    Cardiovascular: Negative for chest pain.   Gastrointestinal: Negative for heartburn.   Genitourinary: Negative for dysuria.   Musculoskeletal: Positive for joint pain (hip pain, stable).   Skin: Negative for itching.   Neurological: Negative for headaches.        Physical Exam  Temp:  [36 °C (96.8 °F)-37.1 °C (98.7 °F)] 36 °C (96.8 °F)  Pulse:  [67-91] 67  Resp:  [16-18] 18  BP: (113-149)/(56-71) 128/58  SpO2:  [91 %-95 %] 95 %    Physical Exam   Constitutional: She is oriented to person, place, and time. She appears well-nourished. No distress.   HENT:   Head: Normocephalic and atraumatic.   Eyes: Conjunctivae are normal.   Neck: Normal range of motion. Neck supple.   Cardiovascular: Normal rate and regular rhythm.    Pulmonary/Chest: Effort normal and breath sounds normal. No respiratory distress. She has no wheezes.   Abdominal: Soft. Bowel sounds are normal. She exhibits no distension. There is no tenderness.   Musculoskeletal: She exhibits tenderness (hips left more than right).   Lymphadenopathy:     She has no cervical adenopathy.   Neurological: She is alert and oriented to person, place, and time. She exhibits normal muscle tone.   Skin: Skin is warm and dry.   Psychiatric: She has a normal mood and affect.       Fluids    Intake/Output Summary (Last 24 hours) at 06/14/19 1550  Last data filed at 06/14/19 1300   Gross per 24 hour    Intake              780 ml   Output                0 ml   Net              780 ml       Laboratory  Recent Labs      06/12/19   0241  06/13/19   0057   WBC  7.3  9.1   RBC  4.23  4.39   HEMOGLOBIN  11.8*  12.3   HEMATOCRIT  37.2  39.9   MCV  87.9  90.9   MCH  27.9  28.0   MCHC  31.7*  30.8*   RDW  59.9*  61.0*   PLATELETCT  307  373   MPV  9.2  9.4     Recent Labs      06/12/19   0241  06/13/19   0057  06/14/19   0252   SODIUM  133*  134*  132*   POTASSIUM  4.1  4.0  3.3*   CHLORIDE  100  99  99   CO2  24  25  25   GLUCOSE  93  84  80   BUN  9  11  10   CREATININE  0.37*  0.46*  0.46*   CALCIUM  8.9  9.4  9.1                   Imaging    Assessment/Plan  * Abscess of right iliac muscle and right gluteus medius muscle- (present on admission)   Assessment & Plan    Recurrent issue since sacral fx repair in December 2018. Has had multiple IR drainage and completed antibx.  - S/P CT guided abscess drainage on 5/30; 2 mL removed  - hardware removal on 6/5  MRI on 6/7 with interval progression of supra and infratentorial intra--axial nodules and associated edema.  New right thalamus lesion this most likely are related to infection rather than malignancy neurosurgery evaluation recommended to continue IV antibiotics for 2 weeks and repeat MRI brain at this time no intervention is recommended  Mult blood cultures neg  CHAS neg 3/15 and 5/24  LTBI vs CNS TB, new  To repeat MRI brain on 6/21.  Will decrease her Dilaudid PCA dose today with plan to stop it tomorrow.     Sepsis (HCC)   Assessment & Plan    This is sepsis (without associated acute organ dysfunction).  Probably from her retained hip hardware.  Blood cultures negative  Sepsis is resolved.  Continue on Cefepime, Vancomycin, Diflucan as per ID         Hypomagnesemia- (present on admission)   Assessment & Plan    Replacing IV magnesium     RLS (restless legs syndrome)- (present on admission)   Assessment & Plan    Continue on pramipexole.  Tolerating well      Chronic pain- (present on admission)   Assessment & Plan    Primarily located at left hip.  On PCA pump, start to wean off PCA pump today, plan to stop tomorrow     Malnutrition (HCC)   Assessment & Plan    Moderate       History of DVT (deep vein thrombosis)- (present on admission)   Assessment & Plan    On xarelto       Hypercalcemia- (present on admission)   Assessment & Plan    Resolved     Essential hypertension- (present on admission)   Assessment & Plan      Continue on losartan, metoprolol, and amlodipine.      Chronic hyponatremia- (present on admission)   Assessment & Plan    Stable, continue monitoring.     History of breast cancer- (present on admission)   Assessment & Plan    S/p left mastectomy and breast implant  Needs follow-up as outpatient     COPD (chronic obstructive pulmonary disease) ([facility])- (present on admission)   Assessment & Plan    Not on any exacerbation at this time, continue RT per protocol          VTE prophylaxis: xarelto

## 2019-06-14 NOTE — PROGRESS NOTES
Infectious Disease Progress Note    Author: Lito Burrows M.D. Date & Time of service: 2019  2:20 PM    Chief Complaint:  Brain abscess, iliopsoas abscess, leukopenia      Interval History:  68 y.o. female admitted 2019 as a transfer from [facility] since 2019. + diabetes, SANTOSH of right hip with hardware, and breast cancer who was originally admitted to [facility] on 2019 for right hip and pelvic pain.  Work-up revealed a right iliopsoas lesion, gluteal abscess, and mult brain abscesses  2019-no fevers.  Continues to complain of significant pain in her hips.  Pain medications are being adjusted.  2019-no fevers.  Continues to remain off the antibiotics.  Awaiting Ortho evaluation  2019 patient febrile up to 103.  Having shaking chills.  Is not feeling well.  Denies any specific complaints.  6/10 AF WBC 5.3 somnolent No fever or new complaints   AF WBC 9 No fever or chills-some pain at surgical site. Limited participation with PT noted   AF WBC 7.3 somnolent but arousable-no new complaints   afebrile WBC 9.1 patient complaining of left hip pain.  Denies any headaches   afebrile, no CBC today.  Reports no new issues, tolerating antibiotics.    Labs Reviewed, Medications Reviewed, and Wound Reviewed.    Review of Systems:  Review of Systems   Constitutional: Negative for chills and fever.   Respiratory: Negative for cough and shortness of breath.    Gastrointestinal: Negative for abdominal pain, diarrhea, nausea and vomiting.   Musculoskeletal: Positive for joint pain.        Left hip   Neurological: Negative for dizziness and headaches.   All other systems reviewed and are negative.  Improved    Hemodynamics:  Temp (24hrs), Av.6 °C (97.8 °F), Min:36 °C (96.8 °F), Max:37.1 °C (98.7 °F)  Temperature: 36 °C (96.8 °F)  Pulse  Av.6  Min: 60  Max: 125  Blood Pressure : 128/58       Physical Exam:  Physical Exam   Constitutional: No distress.   Thin   HENT:    Mouth/Throat: No oropharyngeal exudate.   Eyes: Conjunctivae are normal. No scleral icterus.   Neck: Neck supple. No JVD present.   Cardiovascular: Normal rate and regular rhythm.    No murmur heard.  Pulmonary/Chest: Effort normal and breath sounds normal. No stridor. She has no wheezes. She has no rales.   Abdominal: Soft. She exhibits no distension. There is no tenderness.   Musculoskeletal: She exhibits tenderness. She exhibits no edema.   Right hip with dressing.  No surrounding erythema   Lymphadenopathy:     She has no cervical adenopathy.   Neurological: She is alert.   No gross focal neuro deficit   Skin: Skin is warm. No rash noted. She is not diaphoretic.   Psychiatric: She has a normal mood and affect. Her behavior is normal.   Very pleasant   Nursing note and vitals reviewed.      Meds:    Current Facility-Administered Medications:   •  HYDROmorphone  •  vancomycin  •  rivaroxaban  •  Pharmacy Consult Request  •  magnesium oxide  •  fluconazole  •  MD Alert...Vancomycin per Pharmacy  •  cefepime  •  acetaminophen  •  LORazepam  •  insulin regular **AND** Accu-Chek ACHS **AND** NOTIFY MD and PharmD **AND** glucose **AND** dextrose 10% bolus  •  NS  •  pramipexole  •  lidocaine  •  temazepam  •  cyclobenzaprine  •  Respiratory Care per Protocol  •  amLODIPine  •  cinacalcet  •  losartan  •  acetaminophen  •  atorvastatin  •  omeprazole  •  metoprolol  •  senna-docusate **AND** polyethylene glycol/lytes **AND** magnesium hydroxide **AND** bisacodyl  •  ondansetron  •  ondansetron    Labs:  Recent Labs      06/12/19 0241 06/13/19 0057   WBC  7.3  9.1   RBC  4.23  4.39   HEMOGLOBIN  11.8*  12.3   HEMATOCRIT  37.2  39.9   MCV  87.9  90.9   MCH  27.9  28.0   RDW  59.9*  61.0*   PLATELETCT  307  373   MPV  9.2  9.4   NEUTSPOLYS  64.40  69.20   LYMPHOCYTES  14.80*  12.50*   MONOCYTES  12.50  11.10   EOSINOPHILS  6.60  5.60   BASOPHILS  1.20  1.10     Recent Labs      06/12/19 0241 06/13/19 0057   "06/14/19 0252   SODIUM  133*  134*  132*   POTASSIUM  4.1  4.0  3.3*   CHLORIDE  100  99  99   CO2  24  25  25   GLUCOSE  93  84  80   BUN  9  11  10     Recent Labs      06/12/19   0241  06/13/19   0057  06/14/19 0252   ALBUMIN   --   3.7   --    TBILIRUBIN   --   0.5   --    ALKPHOSPHAT   --   339*   --    TOTPROTEIN   --   7.9   --    ALTSGPT   --   12   --    ASTSGOT   --   32   --    CREATININE  0.37*  0.46*  0.46*       Imaging:  MRI of the brain on 6/8/2019  Impression       1.  Interval progression of supra and infratentorial intra-axial nodules and associated edema. This short-term interval progression favors infection over neoplasm though both remain considerations.  2.  Mild atrophy         Micro:  Results     Procedure Component Value Units Date/Time    URINALYSIS [[id number]]  (Abnormal) Collected:  06/10/19 1010    Order Status:  Completed Specimen:  Urine from Urine, Clean Catch Updated:  06/10/19 1053     Color Yellow     Character Clear     Specific Gravity 1.009     Ph 7.0     Glucose Negative mg/dL      Ketones Negative mg/dL      Protein Negative mg/dL      Bilirubin Negative     Urobilinogen, Urine 0.2     Nitrite Negative     Leukocyte Esterase Trace (A)     Occult Blood Negative     Micro Urine Req Microscopic    Narrative:       Collected By:[id number] SANAM MICHAELS    BLOOD CULTURE [[id number]] Collected:  06/04/19 1512    Order Status:  Completed Specimen:  Blood from Peripheral Updated:  06/09/19 1700     Significant Indicator NEG     Source BLD     Site PERIPHERAL     Culture Result No growth after 5 days of incubation.    Narrative:       Per Hospital Policy: Only change Specimen Src: to \"Line\" if  specified by physician order.  Left Hand    BLOOD CULTURE [[id number]] Collected:  06/04/19 1512    Order Status:  Completed Specimen:  Blood from Peripheral Updated:  06/09/19 1700     Significant Indicator NEG     Source BLD     Site PERIPHERAL     Culture Result No growth after 5 days of " "incubation.    Narrative:       Per Hospital Policy: Only change Specimen Src: to \"Line\" if  specified by physician order.  Left Forearm/Arm    Cryptococcal Antigen [[id number]]     Order Status:  Canceled Specimen:  CSF     Cryptococcal Antigen [[id number]] Collected:  06/09/19 0000    Order Status:  Canceled Specimen:  Blood     Cryptococcal Antigen [[id number]] Collected:  06/09/19 0000    Order Status:  Canceled Specimen:  Blood           Assessment:  Gluteal and iliopsoas abscess  Brain abscesses vs mets  Breast cancer  DM2    Plan:  Brain abscesses vs mets.  Persistent with interval progression on last MRI  MRI 4/23 \"supra and infratentorial brain parenchyma. Decrease in the size of the lesions. Interval reduction in the extent of the surrounding white matter edema consistent with improvement/treatment response of the most of the multifocal brain abscesses.  MRI 5/21 showed innumerable microabscesses vs mets  MRI on 6/8 with interval progression of supra and infratentorial intra--axial nodules and associated edema.  New right thalamus lesion. 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  Resolved  Fever and chills 6/4  Remains afebrile  QuantiGold+. CXR neg      Gluteal and iliopsoas abscess s/p " "treatment.   Adjacent to hardware in right hip (placed 12/17/18)  CT 4/23 \"Fluid collections within the right gluteal and iliacis muscles.. The collection within the right gluteal muscle has unchanged while the fluid collection within the right iliacis muscle is increased somewhat in size.\" 2.7 cm  Cultures 3/29 and 4/4 neg  MRI on 5/20/2019 - interval decrease in collection in R gluteal muscle and persistent multiloculated collection in R iliacus muscle.   CT 5/28 no change  s/p drainage on 5/30/2019-cultures negative  S/p removal hardware 6/5-no cultures done  Panculture neg  1, 3 beta glucan neg  S/p removal hardware 6/5/19  Consider repeat CT next week.  If persistent fluid collection, recommend resampling and sending for regular, bacterial, fungal cultures    Type 2 DM  Hemoglobin A1c 6.3   Keep BS under 150 to help control current infection    Discussed with primary    ID will follow.  Please call with questions.            "

## 2019-06-15 LAB
ANION GAP SERPL CALC-SCNC: 10 MMOL/L (ref 0–11.9)
BUN SERPL-MCNC: 12 MG/DL (ref 8–22)
CALCIUM SERPL-MCNC: 8.5 MG/DL (ref 8.5–10.5)
CHLORIDE SERPL-SCNC: 99 MMOL/L (ref 96–112)
CO2 SERPL-SCNC: 21 MMOL/L (ref 20–33)
CREAT SERPL-MCNC: 0.4 MG/DL (ref 0.5–1.4)
GLUCOSE BLD-MCNC: 109 MG/DL (ref 65–99)
GLUCOSE BLD-MCNC: 90 MG/DL (ref 65–99)
GLUCOSE SERPL-MCNC: 80 MG/DL (ref 65–99)
MAGNESIUM SERPL-MCNC: 1.5 MG/DL (ref 1.5–2.5)
POTASSIUM SERPL-SCNC: 3.7 MMOL/L (ref 3.6–5.5)
SODIUM SERPL-SCNC: 130 MMOL/L (ref 135–145)
VANCOMYCIN TROUGH SERPL-MCNC: 18.6 UG/ML (ref 10–20)

## 2019-06-15 PROCEDURE — A9270 NON-COVERED ITEM OR SERVICE: HCPCS | Performed by: FAMILY MEDICINE

## 2019-06-15 PROCEDURE — 700102 HCHG RX REV CODE 250 W/ 637 OVERRIDE(OP): Performed by: INTERNAL MEDICINE

## 2019-06-15 PROCEDURE — 99233 SBSQ HOSP IP/OBS HIGH 50: CPT | Performed by: INTERNAL MEDICINE

## 2019-06-15 PROCEDURE — 83735 ASSAY OF MAGNESIUM: CPT

## 2019-06-15 PROCEDURE — 700102 HCHG RX REV CODE 250 W/ 637 OVERRIDE(OP): Performed by: FAMILY MEDICINE

## 2019-06-15 PROCEDURE — A9270 NON-COVERED ITEM OR SERVICE: HCPCS | Performed by: INTERNAL MEDICINE

## 2019-06-15 PROCEDURE — 700105 HCHG RX REV CODE 258: Performed by: HOSPITALIST

## 2019-06-15 PROCEDURE — 80202 ASSAY OF VANCOMYCIN: CPT

## 2019-06-15 PROCEDURE — 700105 HCHG RX REV CODE 258: Performed by: INTERNAL MEDICINE

## 2019-06-15 PROCEDURE — 700111 HCHG RX REV CODE 636 W/ 250 OVERRIDE (IP): Performed by: HOSPITALIST

## 2019-06-15 PROCEDURE — A9270 NON-COVERED ITEM OR SERVICE: HCPCS | Performed by: HOSPITALIST

## 2019-06-15 PROCEDURE — 700102 HCHG RX REV CODE 250 W/ 637 OVERRIDE(OP): Performed by: HOSPITALIST

## 2019-06-15 PROCEDURE — A9270 NON-COVERED ITEM OR SERVICE: HCPCS | Performed by: NURSE PRACTITIONER

## 2019-06-15 PROCEDURE — 99232 SBSQ HOSP IP/OBS MODERATE 35: CPT | Performed by: HOSPITALIST

## 2019-06-15 PROCEDURE — 700101 HCHG RX REV CODE 250: Performed by: NURSE PRACTITIONER

## 2019-06-15 PROCEDURE — 36415 COLL VENOUS BLD VENIPUNCTURE: CPT

## 2019-06-15 PROCEDURE — 700111 HCHG RX REV CODE 636 W/ 250 OVERRIDE (IP): Performed by: INTERNAL MEDICINE

## 2019-06-15 PROCEDURE — 770006 HCHG ROOM/CARE - MED/SURG/GYN SEMI*

## 2019-06-15 PROCEDURE — 80048 BASIC METABOLIC PNL TOTAL CA: CPT

## 2019-06-15 PROCEDURE — 82962 GLUCOSE BLOOD TEST: CPT | Mod: 91

## 2019-06-15 PROCEDURE — 700102 HCHG RX REV CODE 250 W/ 637 OVERRIDE(OP): Performed by: NURSE PRACTITIONER

## 2019-06-15 RX ORDER — SUCRALFATE ORAL 1 G/10ML
1 SUSPENSION ORAL EVERY 6 HOURS
Status: DISCONTINUED | OUTPATIENT
Start: 2019-06-15 | End: 2019-07-30

## 2019-06-15 RX ORDER — OXYCODONE HYDROCHLORIDE 5 MG/1
5-10 TABLET ORAL EVERY 4 HOURS PRN
Status: DISCONTINUED | OUTPATIENT
Start: 2019-06-15 | End: 2019-07-21

## 2019-06-15 RX ADMIN — CINACALCET HYDROCHLORIDE 60 MG: 30 TABLET, COATED ORAL at 05:19

## 2019-06-15 RX ADMIN — PRAMIPEXOLE DIHYDROCHLORIDE 1 MG: 0.5 TABLET ORAL at 05:19

## 2019-06-15 RX ADMIN — ACETAMINOPHEN 1000 MG: 500 TABLET ORAL at 18:04

## 2019-06-15 RX ADMIN — ATORVASTATIN CALCIUM 10 MG: 10 TABLET, FILM COATED ORAL at 18:04

## 2019-06-15 RX ADMIN — ACETAMINOPHEN 1000 MG: 500 TABLET ORAL at 05:18

## 2019-06-15 RX ADMIN — OMEPRAZOLE 20 MG: 20 CAPSULE, DELAYED RELEASE ORAL at 05:19

## 2019-06-15 RX ADMIN — CEFEPIME 2 G: 2 INJECTION, POWDER, FOR SOLUTION INTRAVENOUS at 05:18

## 2019-06-15 RX ADMIN — MAGNESIUM OXIDE TAB 400 MG (241.3 MG ELEMENTAL MG) 400 MG: 400 (241.3 MG) TAB at 05:18

## 2019-06-15 RX ADMIN — ONDANSETRON 4 MG: 2 INJECTION INTRAMUSCULAR; INTRAVENOUS at 07:43

## 2019-06-15 RX ADMIN — SUCRALFATE 1 G: 1 SUSPENSION ORAL at 18:03

## 2019-06-15 RX ADMIN — SUCRALFATE 1 G: 1 SUSPENSION ORAL at 11:47

## 2019-06-15 RX ADMIN — CEFEPIME 2 G: 2 INJECTION, POWDER, FOR SOLUTION INTRAVENOUS at 18:03

## 2019-06-15 RX ADMIN — SENNOSIDES AND DOCUSATE SODIUM 2 TABLET: 8.6; 5 TABLET ORAL at 05:19

## 2019-06-15 RX ADMIN — SENNOSIDES AND DOCUSATE SODIUM 2 TABLET: 8.6; 5 TABLET ORAL at 18:04

## 2019-06-15 RX ADMIN — AMLODIPINE BESYLATE 10 MG: 5 TABLET ORAL at 05:19

## 2019-06-15 RX ADMIN — PRAMIPEXOLE DIHYDROCHLORIDE 1 MG: 0.5 TABLET ORAL at 18:04

## 2019-06-15 RX ADMIN — OXYCODONE HYDROCHLORIDE 5 MG: 5 TABLET ORAL at 11:47

## 2019-06-15 RX ADMIN — LIDOCAINE 2 PATCH: 50 PATCH CUTANEOUS at 11:47

## 2019-06-15 RX ADMIN — LOSARTAN POTASSIUM 50 MG: 50 TABLET ORAL at 05:18

## 2019-06-15 RX ADMIN — RIVAROXABAN 20 MG: 20 TABLET, FILM COATED ORAL at 18:04

## 2019-06-15 RX ADMIN — VANCOMYCIN HYDROCHLORIDE 1200 MG: 100 INJECTION, POWDER, LYOPHILIZED, FOR SOLUTION INTRAVENOUS at 13:49

## 2019-06-15 RX ADMIN — PRAMIPEXOLE DIHYDROCHLORIDE 1 MG: 0.5 TABLET ORAL at 11:46

## 2019-06-15 RX ADMIN — FLUCONAZOLE 800 MG: 200 TABLET ORAL at 05:19

## 2019-06-15 RX ADMIN — METOPROLOL TARTRATE 25 MG: 25 TABLET, FILM COATED ORAL at 18:04

## 2019-06-15 RX ADMIN — METOPROLOL TARTRATE 25 MG: 25 TABLET, FILM COATED ORAL at 05:19

## 2019-06-15 ASSESSMENT — ENCOUNTER SYMPTOMS
ABDOMINAL PAIN: 0
COUGH: 0
BLURRED VISION: 0
VOMITING: 0
HEARTBURN: 0
FEVER: 0
CHILLS: 0
DIZZINESS: 0
HEADACHES: 0
NAUSEA: 0
SHORTNESS OF BREATH: 0
DIARRHEA: 0

## 2019-06-15 NOTE — PROGRESS NOTES
"Pharmacy Kinetics 68 y.o. female on vancomycin day # 12  6/15/2019    Currently Dose: Vancomycin 1200 mg iv q12hr (~21 mg/kg)  Received Load Dose: Yes     Indication for Treatment: brain abscess + iliopsoas abscess  ID Service Following: Yes     Pertinent history per medical record: Admitted on 2019 for pelvic abscess (LTACH transfer) after recent admission at [facility] ([place] Concerns for brain and iliopsoas abscess. NSG and ID consulted (I&D per IR 19; hardware removal 19).      Other antibiotics: cefepime 2 gm iv q12h, fluconazole 800 mg po qd     Allergies: Patient has no known allergies.      List concerns for accumulation of vancomycin: age 68, low body weight, abnormal LFT's, BUN:SCr ~20:1     Pertinent cultures to date:   19: C.Diff: Negative   19: PBCx2: NGTD   19: Pelvic abscess,BF: NGTD     MRSA nares swab if pneumonia is a concern (ordered/positive/negative/n-a): n/a    Recent Labs      19   0057   WBC  9.1   NEUTSPOLYS  69.20     Recent Labs      19   0057  19   0252  06/15/19   0056   BUN  11  10  12   CREATININE  0.46*  0.46*  0.40*   ALBUMIN  3.7   --    --      Recent Labs      19   1135  19   0812   VANCOTROUGH  47.1*  15.5     Intake/Output Summary (Last 24 hours) at 06/15/19 0808  Last data filed at 19 1300   Gross per 24 hour   Intake            606.6 ml   Output                0 ml   Net            606.6 ml      /67   Pulse 80   Temp 36.9 °C (98.4 °F) (Temporal)   Resp 17   Ht 1.575 m (5' 2\")   Wt 59.2 kg (130 lb 8.2 oz)   SpO2 92%  Temp (24hrs), Av.6 °C (97.9 °F), Min:36 °C (96.8 °F), Max:37.1 °C (98.7 °F)    Estimated Creatinine Clearance: 103.5 mL/min (A) (by C-G formula based on SCr of 0.4 mg/dL (L)).    A/P   1. Vancomycin dose change: not indicated   2. Next vancomycin level: 6/15/19 @1230 (ordered)  3. Goal trough: 18-22 mcg/mL  4. Comments: VS stable. Afebrile. No new WBC. CrCl ~104 mL/min (SCr " improved). No new microbiology. Factors for accumulation of vancomycin noted. Vancomycin level in place prior to PM dose 6/15/19 to assess clearance. Pharmacy will continue to follow.    Kulwant Adhikari, PharmD     Pharmacy Addendum:   6/15/2019 12:34   Vancomycin Trough 18.6     Updated vancomycin trough noted (18.6 mcg/mL) and at goal range. Will proceed with current dose scheme. Likely to repeat trough in ~2-3 days pending clinical course. Pharmacy will continue to follow.    Kulwant Adhikari, PharmD

## 2019-06-15 NOTE — PROGRESS NOTES
Infectious Disease Progress Note    Author: Lito Burrows M.D. Date & Time of service: 6/15/2019  12:03 PM    Chief Complaint:  Brain abscess, iliopsoas abscess, leukopenia      Interval History:  68 y.o. female admitted 2019 as a transfer from [facility] since 2019. + diabetes, SANTOSH of right hip with hardware, and breast cancer who was originally admitted to [facility] on 2019 for right hip and pelvic pain.  Work-up revealed a right iliopsoas lesion, gluteal abscess, and mult brain abscesses  2019-no fevers.  Continues to complain of significant pain in her hips.  Pain medications are being adjusted.  2019-no fevers.  Continues to remain off the antibiotics.  Awaiting Ortho evaluation  2019 patient febrile up to 103.  Having shaking chills.  Is not feeling well.  Denies any specific complaints.  6/10 AF WBC 5.3 somnolent No fever or new complaints   AF WBC 9 No fever or chills-some pain at surgical site. Limited participation with PT noted   AF WBC 7.3 somnolent but arousable-no new complaints   afebrile WBC 9.1 patient complaining of left hip pain.  Denies any headaches   afebrile, no CBC today.  Reports no new issues, tolerating antibiotics.  States the left hip pain is unchanged.    Labs Reviewed, Medications Reviewed, and Wound Reviewed.    Review of Systems:  Review of Systems   Constitutional: Negative for chills and fever.   Respiratory: Negative for cough and shortness of breath.    Gastrointestinal: Negative for abdominal pain, diarrhea, nausea and vomiting.   Musculoskeletal: Positive for joint pain.        Left hip   Neurological: Negative for dizziness and headaches.   All other systems reviewed and are negative.  Unchanged    Hemodynamics:  Temp (24hrs), Av.7 °C (98.1 °F), Min:36.6 °C (97.8 °F), Max:37.1 °C (98.7 °F)  Temperature: 36.6 °C (97.8 °F)  Pulse  Av.2  Min: 60  Max: 125  Blood Pressure : 117/63       Physical Exam:  Physical Exam    Constitutional: No distress.   Thin   HENT:   Mouth/Throat: No oropharyngeal exudate.   Eyes: Conjunctivae are normal. No scleral icterus.   Neck: Neck supple. No JVD present.   Cardiovascular: Normal rate and regular rhythm.    No murmur heard.  Pulmonary/Chest: Effort normal and breath sounds normal. No stridor. She has no wheezes. She has no rales.   Abdominal: Soft. She exhibits no distension. There is no tenderness.   Musculoskeletal: She exhibits tenderness. She exhibits no edema.   Right hip with dressing.  No surrounding erythema   Lymphadenopathy:     She has no cervical adenopathy.   Neurological: She is alert.   No gross focal neuro deficit   Skin: Skin is warm. No rash noted. She is not diaphoretic.   Psychiatric: She has a normal mood and affect. Her behavior is normal.   Very pleasant   Nursing note and vitals reviewed.  No change compared to 6/14    Meds:    Current Facility-Administered Medications:   •  oxyCODONE immediate-release  •  sucralfate  •  vancomycin  •  rivaroxaban  •  Pharmacy Consult Request  •  magnesium oxide  •  fluconazole  •  MD Alert...Vancomycin per Pharmacy  •  cefepime  •  acetaminophen  •  LORazepam  •  insulin regular **AND** Accu-Chek ACHS **AND** NOTIFY MD and PharmD **AND** glucose **AND** dextrose 10% bolus  •  NS  •  pramipexole  •  lidocaine  •  temazepam  •  cyclobenzaprine  •  Respiratory Care per Protocol  •  amLODIPine  •  cinacalcet  •  losartan  •  acetaminophen  •  atorvastatin  •  omeprazole  •  metoprolol  •  senna-docusate **AND** polyethylene glycol/lytes **AND** magnesium hydroxide **AND** bisacodyl  •  ondansetron  •  ondansetron    Labs:  Recent Labs      06/13/19   0057   WBC  9.1   RBC  4.39   HEMOGLOBIN  12.3   HEMATOCRIT  39.9   MCV  90.9   MCH  28.0   RDW  61.0*   PLATELETCT  373   MPV  9.4   NEUTSPOLYS  69.20   LYMPHOCYTES  12.50*   MONOCYTES  11.10   EOSINOPHILS  5.60   BASOPHILS  1.10     Recent Labs      06/13/19   0057  06/14/19   0252   "06/15/19   0056   SODIUM  134*  132*  130*   POTASSIUM  4.0  3.3*  3.7   CHLORIDE  99  99  99   CO2  25  25  21   GLUCOSE  84  80  80   BUN  11  10  12     Recent Labs      06/13/19   0057  06/14/19   0252  06/15/19   0056   ALBUMIN  3.7   --    --    TBILIRUBIN  0.5   --    --    ALKPHOSPHAT  339*   --    --    TOTPROTEIN  7.9   --    --    ALTSGPT  12   --    --    ASTSGOT  32   --    --    CREATININE  0.46*  0.46*  0.40*       Imaging:  MRI of the brain on 6/8/2019  Impression       1.  Interval progression of supra and infratentorial intra-axial nodules and associated edema. This short-term interval progression favors infection over neoplasm though both remain considerations.  2.  Mild atrophy         Micro:  Results     Procedure Component Value Units Date/Time    URINALYSIS [[id number]]  (Abnormal) Collected:  06/10/19 1010    Order Status:  Completed Specimen:  Urine from Urine, Clean Catch Updated:  06/10/19 1053     Color Yellow     Character Clear     Specific Gravity 1.009     Ph 7.0     Glucose Negative mg/dL      Ketones Negative mg/dL      Protein Negative mg/dL      Bilirubin Negative     Urobilinogen, Urine 0.2     Nitrite Negative     Leukocyte Esterase Trace (A)     Occult Blood Negative     Micro Urine Req Microscopic    Narrative:       Collected By:[id number] SANAM MICHAELS    BLOOD CULTURE [[id number]] Collected:  06/04/19 1512    Order Status:  Completed Specimen:  Blood from Peripheral Updated:  06/09/19 1700     Significant Indicator NEG     Source BLD     Site PERIPHERAL     Culture Result No growth after 5 days of incubation.    Narrative:       Per Hospital Policy: Only change Specimen Src: to \"Line\" if  specified by physician order.  Left Hand    BLOOD CULTURE [[id number]] Collected:  06/04/19 1512    Order Status:  Completed Specimen:  Blood from Peripheral Updated:  06/09/19 1700     Significant Indicator NEG     Source BLD     Site PERIPHERAL     Culture Result No growth after 5 days of " "incubation.    Narrative:       Per Hospital Policy: Only change Specimen Src: to \"Line\" if  specified by physician order.  Left Forearm/Arm    Cryptococcal Antigen [[id number]]     Order Status:  Canceled Specimen:  CSF     Cryptococcal Antigen [[id number]] Collected:  06/09/19 0000    Order Status:  Canceled Specimen:  Blood     Cryptococcal Antigen [[id number]] Collected:  06/09/19 0000    Order Status:  Canceled Specimen:  Blood           Assessment:  Gluteal and iliopsoas abscess  Brain abscesses vs mets  Breast cancer  DM2    Plan:  Brain abscesses vs mets.  Persistent with interval progression on last MRI  MRI 4/23 \"supra and infratentorial brain parenchyma. Decrease in the size of the lesions. Interval reduction in the extent of the surrounding white matter edema consistent with improvement/treatment response of the most of the multifocal brain abscesses.  MRI 5/21 showed innumerable microabscesses vs mets  Abx (vancomycin, cefepime and Flagyl) discontinued on 5/23 after ~8 weeks of treatment-developed new fevers on 6/4  MRI on 6/8 with interval progression of supra and infratentorial intra--axial nodules and associated edema.  New right thalamus lesion. Radiology favors infection over neoplasm though both remain considerations (had been off abx)  Neurosurgery evaluation - recommended continuing antibiotics for another 2 weeks and then repeating MRI brain, as the lesions are not amenable to biopsy   Mult blood cultures neg  CHAS neg 3/15 and 5/24  +QuantiGold as above  Continue vanco/cefepime  Fluconazole 800 mg daily was added on 6/9 and is being continued at this time  Monitor renal function and Vanco trough  Vanco trough 18.6 6/15  Plan for repeat MRI around 6/21/2019 to assess for improvement  Will need to reassess need for biopsy if persistent or worse, and sent for pathology, regular, AFB, fungal cultures     Latent TB  As CNS lesions previously improved on nontuberculosis treatment, continuing as " "above  Repeat MRI in 6/8 with interval progression   If progression noted on repeat MRI brain or pelvis CT, will need biopsy with pathology, AFB, fungal, bacterial cultures    Fever of unknown origin.  Resolved  Fever and chills 6/4  Cultures negative  Now afebrile  QuantiGold+. CXR neg    Gluteal and iliopsoas abscess s/p treatment.   Adjacent to hardware in right hip (placed 12/17/18)  CT 4/23 \"Fluid collections within the right gluteal and iliacis muscles.. The collection within the right gluteal muscle has unchanged while the fluid collection within the right iliacis muscle is increased somewhat in size.\" 2.7 cm  Cultures 3/29 and 4/4 neg  MRI on 5/20/2019 - interval decrease in collection in R gluteal muscle and persistent multiloculated collection in R iliacus muscle.   CT 5/28 no change  s/p drainage on 5/30/2019-cultures negative  S/p removal hardware 6/5-no cultures done  Panculture neg  1, 3 beta glucan neg  S/p removal hardware 6/5/19  CT on 6/8 with residual abscess in the right iliacus muscle, 2.5 x 1.8 cm, slightly smaller than prior  Consider repeat CT next week.  If persistent fluid collection, recommend resampling and sending for regular, bacterial, fungal cultures    Type 2 DM  Hemoglobin A1c 6.3   Keep BS under 150 to help control current infection    Discussed with Dr. Rodriguez    ID will follow.  Please call with questions.            "

## 2019-06-15 NOTE — PROGRESS NOTES
Patient refused accucheck, pt states she has had normal sugars and would not like fingersticks any more, educated regarding importance of intervention, notified provider [place]

## 2019-06-15 NOTE — CARE PLAN
Problem: Safety  Goal: Will remain free from injury  Outcome: PROGRESSING AS EXPECTED  Fall precautions in place, bed alarm on, bed locked in lowest position, non-slip socks, call light in reach.    Problem: Skin Integrity  Goal: Risk for impaired skin integrity will decrease  Outcome: PROGRESSING AS EXPECTED  Q2 turns, pillows for support and repositioning, ENRRIQUE cream, frequent incontinence checks, OOB, Heels floated.

## 2019-06-15 NOTE — PROGRESS NOTES
Hospital Medicine Daily Progress Note    Date of Service  6/15/2019    Chief Complaint  68 y.o. female admitted 5/29/2019 with  persistent abscess.    Hospital Course     Ms. Martini is a 68-year-old female who was transferred from [facility] on 5/29/2019 with persistent right gluteal abscesses since sacral fracture repair in December.  She has had multiple IR drainage.  She initially presented with pelvic pain, low back pain, and confusion. She also had new slurred speech. Imaging of the abdomen, pelvis and brain revealed abscesses and she was treated with a full course of IV antibiotics per ID.  Her slurred speech improved and she was able to ambulate with physical therapy. However, repeat imaging showed persistent pelvic abscesses.  MRI brain shows increase in brain lesions.  Her brain lesions are known from prior imaging and there is concern they represent metastasis.  Oncology aware and do not recommend further imaging. Cultures remained negative and a CHAS done by Dr. Potter showed no vegetations. The size of the pelvic abscesses was reviewed by interventional radiology and the case was discussed with Dr. Finley who recommended transfer to [facility] for CT guided drainage.  S/P drainage on 5/30. On 6/4, febrile again, so restarted cefepime and vancomycin per ID and has been on them since.  Right hip hardware removal 6/5. MRI brain repeat 6/7/19 saw progression of the supra and infratentorial intra-axial nodules with edema, prompting a Neurosurgery consult.  Dr. Nguyen stated lesions not amendable to bx stereotactically, favors infectious, recs abx, antineuroleptic and repeat brain MRI in 2 weeks (around 6/21).  CT pelvis 6/9 with residual fluid collection 2.5 x 1.8 cm in right iliac m., slightly smaller than before.  Cefepime + vanc continue. Now broaden work up to include fungal and empiric started fluconazole 6/9.        Interval Problem Update  Patient is resting in bed, states that pain is better, she is more awake  and oriented today, no fever no chills, appetite still low, complaining of GERD symptoms will start sucralfate today  May need to repeat CT as per ID next week    Consultants/Specialty  Infectious disease  Orthopedic surgery  Neurosurgery, Dr. Nguyen    Code Status  DNR/DNI    Disposition  pending    Review of Systems  Review of Systems   Constitutional: Negative for fever.   HENT: Negative for ear discharge and tinnitus.    Eyes: Negative for blurred vision.   Respiratory: Negative for cough.    Cardiovascular: Negative for chest pain.   Gastrointestinal: Negative for heartburn.   Genitourinary: Negative for dysuria.   Musculoskeletal: Positive for joint pain (hip pain, stable).   Skin: Negative for itching.   Neurological: Negative for headaches.        Physical Exam  Temp:  [36.6 °C (97.8 °F)-37.1 °C (98.7 °F)] 36.6 °C (97.8 °F)  Pulse:  [78-83] 78  Resp:  [17] 17  BP: (108-129)/(57-67) 117/63  SpO2:  [90 %-93 %] 90 %    Physical Exam   Constitutional: She is oriented to person, place, and time. She appears well-nourished. No distress.   HENT:   Head: Normocephalic and atraumatic.   Eyes: Conjunctivae are normal.   Neck: Normal range of motion. Neck supple.   Cardiovascular: Normal rate and regular rhythm.    Pulmonary/Chest: Effort normal and breath sounds normal. No respiratory distress. She has no rales.   Abdominal: Soft. Bowel sounds are normal. She exhibits no distension.   Musculoskeletal: She exhibits tenderness (hips left more than right).   Lymphadenopathy:     She has no cervical adenopathy.   Neurological: She is alert and oriented to person, place, and time. She exhibits normal muscle tone.   Skin: Skin is warm and dry.   Psychiatric: She has a normal mood and affect.       Fluids    Intake/Output Summary (Last 24 hours) at 06/15/19 1055  Last data filed at 06/15/19 0730   Gross per 24 hour   Intake              460 ml   Output                0 ml   Net              460 ml       Laboratory  Recent  Labs      06/13/19   0057   WBC  9.1   RBC  4.39   HEMOGLOBIN  12.3   HEMATOCRIT  39.9   MCV  90.9   MCH  28.0   MCHC  30.8*   RDW  61.0*   PLATELETCT  373   MPV  9.4     Recent Labs      06/13/19   0057  06/14/19   0252  06/15/19   0056   SODIUM  134*  132*  130*   POTASSIUM  4.0  3.3*  3.7   CHLORIDE  99  99  99   CO2  25  25  21   GLUCOSE  84  80  80   BUN  11  10  12   CREATININE  0.46*  0.46*  0.40*   CALCIUM  9.4  9.1  8.5                   Imaging    Assessment/Plan  * Abscess of right iliac muscle and right gluteus medius muscle- (present on admission)   Assessment & Plan    Recurrent issue since sacral fx repair in December 2018. Has had multiple IR drainage and completed antibx.  - S/P CT guided abscess drainage on 5/30; 2 mL removed  - hardware removal on 6/5  MRI on 6/7 with interval progression of supra and infratentorial intra--axial nodules and associated edema.  New right thalamus lesion this most likely are related to infection rather than malignancy neurosurgery evaluation recommended to continue IV antibiotics for 2 weeks and repeat MRI brain at this time no intervention is recommended  Mult blood cultures neg  CHAS neg 3/15 and 5/24  LTBI vs CNS TB, new  To repeat MRI brain on 6/21.  DC PCA pump today and start oral pain medication     Sepsis (HCC)   Assessment & Plan    This is sepsis (without associated acute organ dysfunction).  Probably from her retained hip hardware.  Blood cultures negative  Sepsis is resolved.  Continue on Cefepime, Vancomycin, Diflucan as per ID         Hypomagnesemia- (present on admission)   Assessment & Plan    Improved, continue p.o. supplement     RLS (restless legs syndrome)- (present on admission)   Assessment & Plan    Continue on pramipexole.  Tolerating well     Chronic pain- (present on admission)   Assessment & Plan    Primarily located at left hip.  DC PCA pump.     Malnutrition (HCC)   Assessment & Plan    Moderate       History of DVT (deep vein thrombosis)-  (present on admission)   Assessment & Plan    On xarelto       Hypercalcemia- (present on admission)   Assessment & Plan    Resolved     Essential hypertension- (present on admission)   Assessment & Plan      Continue on losartan, metoprolol, and amlodipine.      Chronic hyponatremia- (present on admission)   Assessment & Plan    Stable, continue monitoring, 130 today     History of breast cancer- (present on admission)   Assessment & Plan    S/p left mastectomy and breast implant  Needs follow-up as outpatient     COPD (chronic obstructive pulmonary disease) (HCC)- (present on admission)   Assessment & Plan    Not on any exacerbation at this time, continue RT per protocol          VTE prophylaxis: xarelto

## 2019-06-15 NOTE — PROGRESS NOTES
· 2 RN skin check complete with Louie RN  · Devices in place 2-PIV.  · Skin assessed under devices yes.  · Confirmed pressure ulcers found on NA.  · New potential pressure ulcers noted on NA. Wound consult placed? NA. Photo uploaded? NA. MIDAS completed? NA.  · The following interventions in place q2 turns, ENRRIQUE cream, pillows for support and repositioning.  · Skin assessment: sacrum red and blanchable.

## 2019-06-15 NOTE — PROGRESS NOTES
"Assumed care at 1900. Received report from RN. Patient is a/o x2-3, vss on ra, q2 turns, max assist.  Denies pain at rest (only with movement) or SOB and in NAD.  Dilaudid PCA.  Q4 neuro checks and vitals.   Assessment complete. Labs reviewed. Patient and RN discussed plan of care. Patient questions answered. Patient needs are met at this time. Bed in lowest and locked position. Call light is within reach. Hourly rounding in place. /57   Pulse 78   Temp 37.1 °C (98.7 °F) (Temporal)   Resp 17   Ht 1.575 m (5' 2\")   Wt 55 kg (121 lb 4.1 oz)   SpO2 93%   Breastfeeding? No   BMI 22.18 kg/m²   "

## 2019-06-15 NOTE — CARE PLAN
Problem: Safety  Goal: Will remain free from falls  Outcome: PROGRESSING AS EXPECTED  Pt is A/Ox4 today, educated on fall precautions. Pt verbalizes understanding. All precautions in place    Problem: Mobility  Goal: Risk for activity intolerance will decrease  Outcome: PROGRESSING SLOWER THAN EXPECTED  Pt refused PT yesterday d/t severe pain with movement. Pt educated on use of PCA for pain control.

## 2019-06-16 LAB
ANION GAP SERPL CALC-SCNC: 5 MMOL/L (ref 0–11.9)
BUN SERPL-MCNC: 10 MG/DL (ref 8–22)
CALCIUM SERPL-MCNC: 8.3 MG/DL (ref 8.5–10.5)
CHLORIDE SERPL-SCNC: 101 MMOL/L (ref 96–112)
CO2 SERPL-SCNC: 26 MMOL/L (ref 20–33)
CREAT SERPL-MCNC: 0.41 MG/DL (ref 0.5–1.4)
GLUCOSE BLD-MCNC: 108 MG/DL (ref 65–99)
GLUCOSE SERPL-MCNC: 91 MG/DL (ref 65–99)
MAGNESIUM SERPL-MCNC: 1.3 MG/DL (ref 1.5–2.5)
POTASSIUM SERPL-SCNC: 3 MMOL/L (ref 3.6–5.5)
SODIUM SERPL-SCNC: 132 MMOL/L (ref 135–145)

## 2019-06-16 PROCEDURE — A9270 NON-COVERED ITEM OR SERVICE: HCPCS | Performed by: HOSPITALIST

## 2019-06-16 PROCEDURE — 700111 HCHG RX REV CODE 636 W/ 250 OVERRIDE (IP): Performed by: HOSPITALIST

## 2019-06-16 PROCEDURE — A9270 NON-COVERED ITEM OR SERVICE: HCPCS | Performed by: NURSE PRACTITIONER

## 2019-06-16 PROCEDURE — 302255 BARRIER CREAM MOISTURE BAZA PROTECT (ZINC) 5OZ: Performed by: HOSPITALIST

## 2019-06-16 PROCEDURE — 700105 HCHG RX REV CODE 258: Performed by: HOSPITALIST

## 2019-06-16 PROCEDURE — A9270 NON-COVERED ITEM OR SERVICE: HCPCS | Performed by: FAMILY MEDICINE

## 2019-06-16 PROCEDURE — 36415 COLL VENOUS BLD VENIPUNCTURE: CPT

## 2019-06-16 PROCEDURE — 770006 HCHG ROOM/CARE - MED/SURG/GYN SEMI*

## 2019-06-16 PROCEDURE — 700102 HCHG RX REV CODE 250 W/ 637 OVERRIDE(OP): Performed by: INTERNAL MEDICINE

## 2019-06-16 PROCEDURE — 700102 HCHG RX REV CODE 250 W/ 637 OVERRIDE(OP): Performed by: FAMILY MEDICINE

## 2019-06-16 PROCEDURE — A9270 NON-COVERED ITEM OR SERVICE: HCPCS | Performed by: INTERNAL MEDICINE

## 2019-06-16 PROCEDURE — 700102 HCHG RX REV CODE 250 W/ 637 OVERRIDE(OP): Performed by: HOSPITALIST

## 2019-06-16 PROCEDURE — 700101 HCHG RX REV CODE 250: Performed by: NURSE PRACTITIONER

## 2019-06-16 PROCEDURE — 80048 BASIC METABOLIC PNL TOTAL CA: CPT

## 2019-06-16 PROCEDURE — 700102 HCHG RX REV CODE 250 W/ 637 OVERRIDE(OP): Performed by: NURSE PRACTITIONER

## 2019-06-16 PROCEDURE — 700105 HCHG RX REV CODE 258: Performed by: INTERNAL MEDICINE

## 2019-06-16 PROCEDURE — 700111 HCHG RX REV CODE 636 W/ 250 OVERRIDE (IP): Performed by: INTERNAL MEDICINE

## 2019-06-16 PROCEDURE — 83735 ASSAY OF MAGNESIUM: CPT

## 2019-06-16 PROCEDURE — 99232 SBSQ HOSP IP/OBS MODERATE 35: CPT | Performed by: HOSPITALIST

## 2019-06-16 PROCEDURE — 82962 GLUCOSE BLOOD TEST: CPT

## 2019-06-16 RX ORDER — POTASSIUM CHLORIDE 20 MEQ/1
40 TABLET, EXTENDED RELEASE ORAL ONCE
Status: COMPLETED | OUTPATIENT
Start: 2019-06-16 | End: 2019-06-16

## 2019-06-16 RX ORDER — MAGNESIUM SULFATE HEPTAHYDRATE 40 MG/ML
2 INJECTION, SOLUTION INTRAVENOUS ONCE
Status: COMPLETED | OUTPATIENT
Start: 2019-06-16 | End: 2019-06-16

## 2019-06-16 RX ADMIN — CEFEPIME 2 G: 2 INJECTION, POWDER, FOR SOLUTION INTRAVENOUS at 16:58

## 2019-06-16 RX ADMIN — PRAMIPEXOLE DIHYDROCHLORIDE 1 MG: 0.5 TABLET ORAL at 04:50

## 2019-06-16 RX ADMIN — FLUCONAZOLE 800 MG: 200 TABLET ORAL at 04:50

## 2019-06-16 RX ADMIN — RIVAROXABAN 20 MG: 20 TABLET, FILM COATED ORAL at 16:58

## 2019-06-16 RX ADMIN — POTASSIUM CHLORIDE 40 MEQ: 1500 TABLET, EXTENDED RELEASE ORAL at 08:53

## 2019-06-16 RX ADMIN — ACETAMINOPHEN 1000 MG: 500 TABLET ORAL at 16:58

## 2019-06-16 RX ADMIN — ACETAMINOPHEN 1000 MG: 500 TABLET ORAL at 04:51

## 2019-06-16 RX ADMIN — SUCRALFATE 1 G: 1 SUSPENSION ORAL at 16:58

## 2019-06-16 RX ADMIN — PRAMIPEXOLE DIHYDROCHLORIDE 1 MG: 0.5 TABLET ORAL at 16:58

## 2019-06-16 RX ADMIN — OXYCODONE HYDROCHLORIDE 5 MG: 5 TABLET ORAL at 09:15

## 2019-06-16 RX ADMIN — METOPROLOL TARTRATE 25 MG: 25 TABLET, FILM COATED ORAL at 16:58

## 2019-06-16 RX ADMIN — CINACALCET HYDROCHLORIDE 60 MG: 30 TABLET, COATED ORAL at 04:50

## 2019-06-16 RX ADMIN — ATORVASTATIN CALCIUM 10 MG: 10 TABLET, FILM COATED ORAL at 16:58

## 2019-06-16 RX ADMIN — MAGNESIUM SULFATE 2 G: 2 INJECTION INTRAVENOUS at 08:53

## 2019-06-16 RX ADMIN — CYCLOBENZAPRINE 10 MG: 10 TABLET, FILM COATED ORAL at 13:02

## 2019-06-16 RX ADMIN — LIDOCAINE 2 PATCH: 50 PATCH CUTANEOUS at 13:37

## 2019-06-16 RX ADMIN — AMLODIPINE BESYLATE 10 MG: 5 TABLET ORAL at 04:50

## 2019-06-16 RX ADMIN — CEFEPIME 2 G: 2 INJECTION, POWDER, FOR SOLUTION INTRAVENOUS at 04:50

## 2019-06-16 RX ADMIN — TEMAZEPAM 15 MG: 15 CAPSULE ORAL at 00:03

## 2019-06-16 RX ADMIN — SUCRALFATE 1 G: 1 SUSPENSION ORAL at 04:51

## 2019-06-16 RX ADMIN — OMEPRAZOLE 20 MG: 20 CAPSULE, DELAYED RELEASE ORAL at 04:50

## 2019-06-16 RX ADMIN — PRAMIPEXOLE DIHYDROCHLORIDE 1 MG: 0.5 TABLET ORAL at 12:43

## 2019-06-16 RX ADMIN — SUCRALFATE 1 G: 1 SUSPENSION ORAL at 12:43

## 2019-06-16 RX ADMIN — VANCOMYCIN HYDROCHLORIDE 1200 MG: 100 INJECTION, POWDER, LYOPHILIZED, FOR SOLUTION INTRAVENOUS at 12:43

## 2019-06-16 RX ADMIN — MAGNESIUM OXIDE TAB 400 MG (241.3 MG ELEMENTAL MG) 400 MG: 400 (241.3 MG) TAB at 04:52

## 2019-06-16 RX ADMIN — SUCRALFATE 1 G: 1 SUSPENSION ORAL at 00:03

## 2019-06-16 RX ADMIN — METOPROLOL TARTRATE 25 MG: 25 TABLET, FILM COATED ORAL at 04:51

## 2019-06-16 RX ADMIN — LOSARTAN POTASSIUM 50 MG: 50 TABLET ORAL at 04:52

## 2019-06-16 RX ADMIN — SENNOSIDES AND DOCUSATE SODIUM 2 TABLET: 8.6; 5 TABLET ORAL at 04:50

## 2019-06-16 ASSESSMENT — ENCOUNTER SYMPTOMS
HEARTBURN: 0
HEADACHES: 0
PALPITATIONS: 0
COUGH: 0
CHILLS: 0
BLURRED VISION: 0

## 2019-06-16 NOTE — PROGRESS NOTES
Hospital Medicine Daily Progress Note    Date of Service  6/16/2019    Chief Complaint  68 y.o. female admitted 5/29/2019 with  persistent abscess.    Hospital Course     Ms. Martini is a 68-year-old female who was transferred from [facility] on 5/29/2019 with persistent right gluteal abscesses since sacral fracture repair in December.  She has had multiple IR drainage.  She initially presented with pelvic pain, low back pain, and confusion. She also had new slurred speech. Imaging of the abdomen, pelvis and brain revealed abscesses and she was treated with a full course of IV antibiotics per ID.  Her slurred speech improved and she was able to ambulate with physical therapy. However, repeat imaging showed persistent pelvic abscesses.  MRI brain shows increase in brain lesions.  Her brain lesions are known from prior imaging and there is concern they represent metastasis.  Oncology aware and do not recommend further imaging. Cultures remained negative and a CHAS done by Dr. Potter showed no vegetations. The size of the pelvic abscesses was reviewed by interventional radiology and the case was discussed with Dr. Finley who recommended transfer to [facility] for CT guided drainage.  S/P drainage on 5/30. On 6/4, febrile again, so restarted cefepime and vancomycin per ID and has been on them since.  Right hip hardware removal 6/5. MRI brain repeat 6/7/19 saw progression of the supra and infratentorial intra-axial nodules with edema, prompting a Neurosurgery consult.  Dr. Nguyen stated lesions not amendable to bx stereotactically, favors infectious, recs abx, antineuroleptic and repeat brain MRI in 2 weeks (around 6/21).  CT pelvis 6/9 with residual fluid collection 2.5 x 1.8 cm in right iliac m., slightly smaller than before.  Cefepime + vanc continue. Now broaden work up to include fungal and empiric started fluconazole 6/9.        Interval Problem Update  Patient seen and examined today, she is alert oriented follows commands,  pain needs stable on p.o. medication, PT OT evaluation pending, no fever no chills, electrolyte abnormality corrected.  No nausea vomiting diarrhea constipation.    Consultants/Specialty  Infectious disease  Orthopedic surgery  Neurosurgery, Dr. Nguyen    Code Status  DNR/DNI    Disposition  pending    Review of Systems  Review of Systems   Constitutional: Negative for chills.   HENT: Negative for tinnitus.    Eyes: Negative for blurred vision.   Respiratory: Negative for cough.    Cardiovascular: Negative for palpitations.   Gastrointestinal: Negative for heartburn.   Genitourinary: Negative for urgency.   Musculoskeletal: Positive for joint pain (hip pain, stable).   Skin: Negative for itching.   Neurological: Negative for headaches.        Physical Exam  Temp:  [36.4 °C (97.5 °F)-37.5 °C (99.5 °F)] 36.6 °C (97.9 °F)  Pulse:  [74-94] 74  Resp:  [16-18] 18  BP: (108-126)/(56-72) 119/56  SpO2:  [90 %-96 %] 96 %    Physical Exam   Constitutional: She is oriented to person, place, and time. She appears well-nourished. No distress.   HENT:   Head: Normocephalic and atraumatic.   Eyes: Conjunctivae are normal.   Neck: Normal range of motion. Neck supple.   Cardiovascular: Normal rate and regular rhythm.    Pulmonary/Chest: Effort normal and breath sounds normal. No respiratory distress. She has no wheezes.   Abdominal: Soft. Bowel sounds are normal. There is no tenderness.   Musculoskeletal: She exhibits tenderness (hips left more than right).   Neurological: She is alert and oriented to person, place, and time. She exhibits normal muscle tone.   Skin: Skin is warm and dry.   Psychiatric: She has a normal mood and affect.       Fluids    Intake/Output Summary (Last 24 hours) at 06/16/19 1048  Last data filed at 06/16/19 0914   Gross per 24 hour   Intake              240 ml   Output              200 ml   Net               40 ml       Laboratory      Recent Labs      06/14/19   0252  06/15/19   0056  06/16/19   0243    SODIUM  132*  130*  132*   POTASSIUM  3.3*  3.7  3.0*   CHLORIDE  99  99  101   CO2  25  21  26   GLUCOSE  80  80  91   BUN  10  12  10   CREATININE  0.46*  0.40*  0.41*   CALCIUM  9.1  8.5  8.3*                   Imaging    Assessment/Plan  * Abscess of right iliac muscle and right gluteus medius muscle- (present on admission)   Assessment & Plan    Recurrent issue since sacral fx repair in December 2018. Has had multiple IR drainage and completed antibx.  - S/P CT guided abscess drainage on 5/30; 2 mL removed  - hardware removal on 6/5  MRI on 6/7 with interval progression of supra and infratentorial intra--axial nodules and associated edema.  New right thalamus lesion this most likely are related to infection rather than malignancy neurosurgery evaluation recommended to continue IV antibiotics for 2 weeks and repeat MRI brain at this time no intervention is recommended  Mult blood cultures neg  CHAS neg 3/15 and 5/24  LTBI vs CNS TB, new  To repeat MRI brain on 6/21.    Pain is stable on p.o. medication     Sepsis (HCC)   Assessment & Plan    This is sepsis (without associated acute organ dysfunction).  Probably from her retained hip hardware.  Blood cultures negative  Sepsis is resolved.  Continue on Cefepime, Vancomycin, Diflucan as per ID  Repeat CT abdomen tomorrow         Hypomagnesemia- (present on admission)   Assessment & Plan    Supplementing today with IV magnesium  With hypokalemia replacing.     RLS (restless legs syndrome)- (present on admission)   Assessment & Plan    Continue on pramipexole.  Tolerating well     Chronic pain- (present on admission)   Assessment & Plan    Primarily located at left hip.  Pain is stable on pain medication     Malnutrition (HCC)   Assessment & Plan    Moderate       History of DVT (deep vein thrombosis)- (present on admission)   Assessment & Plan    On xarelto       Hypercalcemia- (present on admission)   Assessment & Plan    Resolved     Essential hypertension-  (present on admission)   Assessment & Plan      Continue on losartan, metoprolol, and amlodipine.      Chronic hyponatremia- (present on admission)   Assessment & Plan    Stable, continue monitoring, 132 today     History of breast cancer- (present on admission)   Assessment & Plan    S/p left mastectomy and breast implant  Needs follow-up as outpatient     COPD (chronic obstructive pulmonary disease) (HCC)- (present on admission)   Assessment & Plan    Not on any exacerbation at this time, continue RT per protocol          VTE prophylaxis: xarelto

## 2019-06-16 NOTE — PROGRESS NOTES
· 2 RN skin check complete with Davina NGUYEN   · Devices in place two PIVs.  · Skin assessed under devices yes.  · Confirmed pressure ulcers found on n/a  · New potential pressure ulcers noted on n/a.   · The following interventions are in place torrey cream, waffle cushion overlay, pillows for support/repositioning and to float heels.  · Sacrum red/blanchable. All other skin intact.

## 2019-06-16 NOTE — PROGRESS NOTES
"Assumed care at 1900. Received report from RN. Patient is a.o x4... Some confusion, vss on RA, q2 turns, max assist.  Denies pain at rest Lidocaine patches and PRN Oxy, denies SOB and in NAD. Assessment complete. Labs reviewed. Patient and RN discussed plan of care. Patient questions answered. Patient needs are met at this time. Bed in lowest and locked position. Call light is within reach. Hourly rounding in place. /68   Pulse 87   Temp 37.1 °C (98.8 °F) (Temporal)   Resp 17   Ht 1.575 m (5' 2\")   Wt 59.2 kg (130 lb 8.2 oz)   SpO2 91%   Breastfeeding? No   BMI 23.87 kg/m²   "

## 2019-06-16 NOTE — PROGRESS NOTES
· 2 RN skin check complete with Louie RN  · Devices in place 2-PIV.  · Skin assessed under devices yes.  · Confirmed pressure ulcers found on NO.  · New potential pressure ulcers noted on NA. Wound consult placed? NA. Photo uploaded? NA. MIDAS completed? NA.  · The following interventions in place q2 turns, ENRRIQUE  Crea, pillows for support and positioning.  · Skin assessment: sacrum red and blanchable, dressing on right hip is dry and intact.

## 2019-06-16 NOTE — PROGRESS NOTES
"Pharmacy Kinetics 68 y.o. female on vancomycin day # 13  2019    Currently Dose: Vancomycin 1200 mg iv q24hr (~21 mg/kg)  Received Load Dose: Yes    Indication for Treatment: brain abscess + iliopsoas abscess  ID Service Following: Yes     Pertinent history per medical record: Admitted on 2019 for pelvic abscess (LTACH transfer) after recent admission at [facility] ([facility]). Concerns for brain and iliopsoas abscess. NSG and ID consulted (I&D per IR 19; hardware removal 19).      Other antibiotics: cefepime 2 gm iv q12h, fluconazole 800 mg po qd     Allergies: Patient has no known allergies.      List concerns for accumulation of vancomycin: age 68, low body weight, abnormal LFT's, BUN:SCr ~20:1     Pertinent cultures to date:   19: C.Diff: Negative   19: PBCx2: NGTD   19: Pelvic abscess,BF: NGTD      MRSA nares swab if pneumonia is a concern (ordered/positive/negative/n-a): n/a    Recent Labs      19   0252  06/15/19   0056  19   0243   BUN  10  12  10   CREATININE  0.46*  0.40*  0.41*     Recent Labs      19   0812  06/15/19   1234   VANCOTROUGH  15.5  18.6     Intake/Output Summary (Last 24 hours) at 19 0729  Last data filed at 06/15/19 1300   Gross per 24 hour   Intake              580 ml   Output                0 ml   Net              580 ml      /72   Pulse 77   Temp 36.4 °C (97.5 °F) (Temporal)   Resp 16   Ht 1.575 m (5' 2\")   Wt 59.2 kg (130 lb 8.2 oz)   SpO2 95%  Temp (24hrs), Av °C (98.6 °F), Min:36.4 °C (97.5 °F), Max:37.5 °C (99.5 °F)    Estimated Creatinine Clearance: 101 mL/min (A) (by C-G formula based on SCr of 0.41 mg/dL (L)).    A/P   1. Vancomycin dose change: not indicated   2. Next vancomycin level: 19 @1230 (ordered)  3. Goal trough: 18-22 mcg/mL  4. Comments: VS stable. Afebrile. No new CBC. CrCl ~101 mL/min (SCr stable). No new microbiology. Most recent vancomycin level noted and at goal. Likely to repeat vancomycin " level in ~2-3 days. BMP with AM labs. Pharmacy will continue to follow.    Kulwant Adhikari, PharmD

## 2019-06-16 NOTE — CARE PLAN
Problem: Communication  Goal: The ability to communicate needs accurately and effectively will improve  Outcome: PROGRESSING AS EXPECTED  Educated pt on plan of care for the day. Pt verbalized understanding. Utilizing call light appropriately.       Problem: Safety  Goal: Will remain free from injury  Outcome: PROGRESSING AS EXPECTED  Call light and personal belongings within reach. Bed in lowest and locked position. Hourly rounding in place.

## 2019-06-17 ENCOUNTER — APPOINTMENT (OUTPATIENT)
Dept: RADIOLOGY | Facility: MEDICAL CENTER | Age: 69
DRG: 356 | End: 2019-06-17
Attending: HOSPITALIST
Payer: MEDICARE

## 2019-06-17 LAB
ANION GAP SERPL CALC-SCNC: 10 MMOL/L (ref 0–11.9)
BUN SERPL-MCNC: 11 MG/DL (ref 8–22)
CALCIUM SERPL-MCNC: 7.8 MG/DL (ref 8.5–10.5)
CHLORIDE SERPL-SCNC: 100 MMOL/L (ref 96–112)
CO2 SERPL-SCNC: 25 MMOL/L (ref 20–33)
CREAT SERPL-MCNC: 0.5 MG/DL (ref 0.5–1.4)
GLUCOSE BLD-MCNC: 108 MG/DL (ref 65–99)
GLUCOSE BLD-MCNC: 118 MG/DL (ref 65–99)
GLUCOSE BLD-MCNC: 92 MG/DL (ref 65–99)
GLUCOSE BLD-MCNC: 94 MG/DL (ref 65–99)
GLUCOSE SERPL-MCNC: 125 MG/DL (ref 65–99)
HCT VFR BLD AUTO: 34.3 % (ref 37–47)
HGB BLD-MCNC: 11.1 G/DL (ref 12–16)
MAGNESIUM SERPL-MCNC: 1.4 MG/DL (ref 1.5–2.5)
POTASSIUM SERPL-SCNC: 2.9 MMOL/L (ref 3.6–5.5)
SODIUM SERPL-SCNC: 135 MMOL/L (ref 135–145)
VANCOMYCIN TROUGH SERPL-MCNC: 16.9 UG/ML (ref 10–20)

## 2019-06-17 PROCEDURE — 700102 HCHG RX REV CODE 250 W/ 637 OVERRIDE(OP): Performed by: INTERNAL MEDICINE

## 2019-06-17 PROCEDURE — 700105 HCHG RX REV CODE 258: Performed by: HOSPITALIST

## 2019-06-17 PROCEDURE — 700102 HCHG RX REV CODE 250 W/ 637 OVERRIDE(OP): Performed by: FAMILY MEDICINE

## 2019-06-17 PROCEDURE — 700111 HCHG RX REV CODE 636 W/ 250 OVERRIDE (IP): Performed by: INTERNAL MEDICINE

## 2019-06-17 PROCEDURE — 82962 GLUCOSE BLOOD TEST: CPT | Mod: 91

## 2019-06-17 PROCEDURE — A9270 NON-COVERED ITEM OR SERVICE: HCPCS | Performed by: INTERNAL MEDICINE

## 2019-06-17 PROCEDURE — 97535 SELF CARE MNGMENT TRAINING: CPT

## 2019-06-17 PROCEDURE — 700111 HCHG RX REV CODE 636 W/ 250 OVERRIDE (IP): Performed by: HOSPITALIST

## 2019-06-17 PROCEDURE — 700102 HCHG RX REV CODE 250 W/ 637 OVERRIDE(OP): Performed by: HOSPITALIST

## 2019-06-17 PROCEDURE — A9270 NON-COVERED ITEM OR SERVICE: HCPCS | Performed by: HOSPITALIST

## 2019-06-17 PROCEDURE — 700102 HCHG RX REV CODE 250 W/ 637 OVERRIDE(OP): Performed by: NURSE PRACTITIONER

## 2019-06-17 PROCEDURE — 85018 HEMOGLOBIN: CPT

## 2019-06-17 PROCEDURE — 97530 THERAPEUTIC ACTIVITIES: CPT

## 2019-06-17 PROCEDURE — 83735 ASSAY OF MAGNESIUM: CPT

## 2019-06-17 PROCEDURE — A9270 NON-COVERED ITEM OR SERVICE: HCPCS | Performed by: NURSE PRACTITIONER

## 2019-06-17 PROCEDURE — 72193 CT PELVIS W/DYE: CPT

## 2019-06-17 PROCEDURE — 80202 ASSAY OF VANCOMYCIN: CPT

## 2019-06-17 PROCEDURE — 770006 HCHG ROOM/CARE - MED/SURG/GYN SEMI*

## 2019-06-17 PROCEDURE — 700101 HCHG RX REV CODE 250: Performed by: NURSE PRACTITIONER

## 2019-06-17 PROCEDURE — 80048 BASIC METABOLIC PNL TOTAL CA: CPT

## 2019-06-17 PROCEDURE — 99232 SBSQ HOSP IP/OBS MODERATE 35: CPT | Performed by: HOSPITALIST

## 2019-06-17 PROCEDURE — 700105 HCHG RX REV CODE 258: Performed by: INTERNAL MEDICINE

## 2019-06-17 PROCEDURE — 85014 HEMATOCRIT: CPT

## 2019-06-17 PROCEDURE — 700117 HCHG RX CONTRAST REV CODE 255: Performed by: HOSPITALIST

## 2019-06-17 PROCEDURE — 36415 COLL VENOUS BLD VENIPUNCTURE: CPT

## 2019-06-17 PROCEDURE — 99233 SBSQ HOSP IP/OBS HIGH 50: CPT | Performed by: INTERNAL MEDICINE

## 2019-06-17 PROCEDURE — A9270 NON-COVERED ITEM OR SERVICE: HCPCS | Performed by: FAMILY MEDICINE

## 2019-06-17 RX ORDER — MAGNESIUM SULFATE HEPTAHYDRATE 40 MG/ML
2 INJECTION, SOLUTION INTRAVENOUS ONCE
Status: COMPLETED | OUTPATIENT
Start: 2019-06-17 | End: 2019-06-17

## 2019-06-17 RX ORDER — POTASSIUM CHLORIDE 20 MEQ/1
40 TABLET, EXTENDED RELEASE ORAL 2 TIMES DAILY
Status: DISCONTINUED | OUTPATIENT
Start: 2019-06-17 | End: 2019-06-27

## 2019-06-17 RX ADMIN — MAGNESIUM SULFATE 2 G: 2 INJECTION INTRAVENOUS at 11:53

## 2019-06-17 RX ADMIN — POTASSIUM CHLORIDE 40 MEQ: 20 TABLET, EXTENDED RELEASE ORAL at 10:08

## 2019-06-17 RX ADMIN — ACETAMINOPHEN 650 MG: 325 TABLET, FILM COATED ORAL at 10:09

## 2019-06-17 RX ADMIN — METOPROLOL TARTRATE 25 MG: 25 TABLET, FILM COATED ORAL at 06:00

## 2019-06-17 RX ADMIN — METOPROLOL TARTRATE 25 MG: 25 TABLET, FILM COATED ORAL at 17:01

## 2019-06-17 RX ADMIN — OXYCODONE HYDROCHLORIDE 5 MG: 5 TABLET ORAL at 00:57

## 2019-06-17 RX ADMIN — ACETAMINOPHEN 1000 MG: 500 TABLET ORAL at 17:01

## 2019-06-17 RX ADMIN — LIDOCAINE 2 PATCH: 50 PATCH CUTANEOUS at 10:09

## 2019-06-17 RX ADMIN — SUCRALFATE 1 G: 1 SUSPENSION ORAL at 17:00

## 2019-06-17 RX ADMIN — FLUCONAZOLE 800 MG: 200 TABLET ORAL at 06:00

## 2019-06-17 RX ADMIN — ATORVASTATIN CALCIUM 10 MG: 10 TABLET, FILM COATED ORAL at 17:01

## 2019-06-17 RX ADMIN — ACETAMINOPHEN 1000 MG: 500 TABLET ORAL at 06:00

## 2019-06-17 RX ADMIN — SUCRALFATE 1 G: 1 SUSPENSION ORAL at 05:59

## 2019-06-17 RX ADMIN — IOHEXOL 100 ML: 350 INJECTION, SOLUTION INTRAVENOUS at 07:30

## 2019-06-17 RX ADMIN — SENNOSIDES AND DOCUSATE SODIUM 2 TABLET: 8.6; 5 TABLET ORAL at 06:00

## 2019-06-17 RX ADMIN — CEFEPIME 2 G: 2 INJECTION, POWDER, FOR SOLUTION INTRAVENOUS at 05:59

## 2019-06-17 RX ADMIN — SUCRALFATE 1 G: 1 SUSPENSION ORAL at 00:39

## 2019-06-17 RX ADMIN — POTASSIUM CHLORIDE 40 MEQ: 20 TABLET, EXTENDED RELEASE ORAL at 17:11

## 2019-06-17 RX ADMIN — VANCOMYCIN HYDROCHLORIDE 1200 MG: 100 INJECTION, POWDER, LYOPHILIZED, FOR SOLUTION INTRAVENOUS at 14:05

## 2019-06-17 RX ADMIN — SUCRALFATE 1 G: 1 SUSPENSION ORAL at 11:52

## 2019-06-17 RX ADMIN — MAGNESIUM OXIDE TAB 400 MG (241.3 MG ELEMENTAL MG) 400 MG: 400 (241.3 MG) TAB at 05:59

## 2019-06-17 RX ADMIN — PRAMIPEXOLE DIHYDROCHLORIDE 1 MG: 0.5 TABLET ORAL at 11:52

## 2019-06-17 RX ADMIN — SENNOSIDES AND DOCUSATE SODIUM 2 TABLET: 8.6; 5 TABLET ORAL at 17:01

## 2019-06-17 RX ADMIN — OXYCODONE HYDROCHLORIDE 5 MG: 5 TABLET ORAL at 17:00

## 2019-06-17 RX ADMIN — LOSARTAN POTASSIUM 50 MG: 50 TABLET ORAL at 06:01

## 2019-06-17 RX ADMIN — AMLODIPINE BESYLATE 10 MG: 5 TABLET ORAL at 06:01

## 2019-06-17 RX ADMIN — PRAMIPEXOLE DIHYDROCHLORIDE 1 MG: 0.5 TABLET ORAL at 06:02

## 2019-06-17 RX ADMIN — CEFEPIME 2 G: 2 INJECTION, POWDER, FOR SOLUTION INTRAVENOUS at 17:11

## 2019-06-17 RX ADMIN — PRAMIPEXOLE DIHYDROCHLORIDE 1 MG: 0.5 TABLET ORAL at 17:01

## 2019-06-17 RX ADMIN — OMEPRAZOLE 20 MG: 20 CAPSULE, DELAYED RELEASE ORAL at 06:00

## 2019-06-17 RX ADMIN — RIVAROXABAN 20 MG: 20 TABLET, FILM COATED ORAL at 17:00

## 2019-06-17 ASSESSMENT — COGNITIVE AND FUNCTIONAL STATUS - GENERAL
STANDING UP FROM CHAIR USING ARMS: A LITTLE
TOILETING: A LOT
MOVING FROM LYING ON BACK TO SITTING ON SIDE OF FLAT BED: UNABLE
MOBILITY SCORE: 12
HELP NEEDED FOR BATHING: A LOT
TURNING FROM BACK TO SIDE WHILE IN FLAT BAD: A LITTLE
SUGGESTED CMS G CODE MODIFIER MOBILITY: CL
EATING MEALS: A LITTLE
SUGGESTED CMS G CODE MODIFIER DAILY ACTIVITY: CK
DAILY ACTIVITIY SCORE: 15
CLIMB 3 TO 5 STEPS WITH RAILING: A LOT
PERSONAL GROOMING: A LITTLE
DRESSING REGULAR UPPER BODY CLOTHING: A LITTLE
DRESSING REGULAR LOWER BODY CLOTHING: A LOT
MOVING TO AND FROM BED TO CHAIR: UNABLE
WALKING IN HOSPITAL ROOM: A LOT

## 2019-06-17 ASSESSMENT — ENCOUNTER SYMPTOMS
VOMITING: 0
HEARTBURN: 0
ABDOMINAL PAIN: 0
DIZZINESS: 0
HEADACHES: 0
DIARRHEA: 0
SHORTNESS OF BREATH: 0
PALPITATIONS: 0
CHILLS: 0
NAUSEA: 0
FEVER: 0
COUGH: 0
DOUBLE VISION: 0
HEMOPTYSIS: 0

## 2019-06-17 ASSESSMENT — GAIT ASSESSMENTS
DISTANCE (FEET): 2
ASSISTIVE DEVICE: FRONT WHEEL WALKER
DEVIATION: SHUFFLED GAIT;DECREASED HEEL STRIKE;DECREASED TOE OFF;OTHER (COMMENT)
GAIT LEVEL OF ASSIST: MINIMAL ASSIST

## 2019-06-17 NOTE — PROGRESS NOTES
Hospital Medicine Daily Progress Note    Date of Service  6/17/2019    Chief Complaint  68 y.o. female admitted 5/29/2019 with  persistent abscess.    Hospital Course     Ms. Martini is a 68-year-old female who was transferred from [facility] on 5/29/2019 with persistent right gluteal abscesses since sacral fracture repair in December.  She has had multiple IR drainage.  She initially presented with pelvic pain, low back pain, and confusion. She also had new slurred speech. Imaging of the abdomen, pelvis and brain revealed abscesses and she was treated with a full course of IV antibiotics per ID.  Her slurred speech improved and she was able to ambulate with physical therapy. However, repeat imaging showed persistent pelvic abscesses.  MRI brain shows increase in brain lesions.  Her brain lesions are known from prior imaging and there is concern they represent metastasis.  Oncology aware and do not recommend further imaging. Cultures remained negative and a CHAS done by Dr. Potter showed no vegetations. The size of the pelvic abscesses was reviewed by interventional radiology and the case was discussed with Dr. Finley who recommended transfer to [facility] for CT guided drainage.  S/P drainage on 5/30. On 6/4, febrile again, so restarted cefepime and vancomycin per ID and has been on them since.  Right hip hardware removal 6/5. MRI brain repeat 6/7/19 saw progression of the supra and infratentorial intra-axial nodules with edema, prompting a Neurosurgery consult.  Dr. Nguyen stated lesions not amendable to bx stereotactically, favors infectious, recs abx, antineuroleptic and repeat brain MRI in 2 weeks (around 6/21).  CT pelvis 6/9 with residual fluid collection 2.5 x 1.8 cm in right iliac m., slightly smaller than before.  Cefepime + vanc continue. Now broaden work up to include fungal and empiric started fluconazole 6/9.        Interval Problem Update  Patient is resting in bed, no new complaints, she would work with PT OT  today, no fever no chills, pain is stable as long as she is in bed, CT findings reviewed, discussed with infectious disease, might need IR drainage again.    Consultants/Specialty  Infectious disease  Orthopedic surgery  Neurosurgery, Dr. Nguyen    Code Status  DNR/DNI    Disposition  pending    Review of Systems  Review of Systems   Constitutional: Negative for fever.   HENT: Negative for tinnitus.    Eyes: Negative for double vision.   Respiratory: Negative for hemoptysis.    Cardiovascular: Negative for palpitations.   Gastrointestinal: Negative for heartburn.   Genitourinary: Negative for dysuria.   Musculoskeletal: Positive for joint pain (hip pain, stable).   Skin: Negative for itching.   Neurological: Negative for headaches.        Physical Exam  Temp:  [36.4 °C (97.5 °F)-37 °C (98.6 °F)] 37 °C (98.6 °F)  Pulse:  [82-98] 82  Resp:  [17-19] 17  BP: (113-137)/(55-73) 128/65  SpO2:  [91 %-93 %] 92 %    Physical Exam   Constitutional: She is oriented to person, place, and time. She appears well-nourished. No distress.   HENT:   Head: Normocephalic and atraumatic.   Eyes: Conjunctivae are normal.   Neck: Normal range of motion. Neck supple.   Cardiovascular: Normal rate and regular rhythm.    Pulmonary/Chest: Effort normal and breath sounds normal. No respiratory distress. She has no rales.   Abdominal: Soft. Bowel sounds are normal. There is no tenderness.   Musculoskeletal: She exhibits tenderness (hips left more than right).   Lymphadenopathy:     She has no cervical adenopathy.   Neurological: She is alert and oriented to person, place, and time. She exhibits normal muscle tone.   Skin: Skin is warm and dry.   Psychiatric: She has a normal mood and affect.       Fluids    Intake/Output Summary (Last 24 hours) at 06/17/19 1204  Last data filed at 06/17/19 0900   Gross per 24 hour   Intake              240 ml   Output                0 ml   Net              240 ml       Laboratory      Recent Labs      06/15/19    0056  06/16/19   0243  06/17/19   0157   SODIUM  130*  132*  135   POTASSIUM  3.7  3.0*  2.9*   CHLORIDE  99  101  100   CO2  21  26  25   GLUCOSE  80  91  125*   BUN  12  10  11   CREATININE  0.40*  0.41*  0.50   CALCIUM  8.5  8.3*  7.8*                   Imaging    Assessment/Plan  * Abscess of right iliac muscle and right gluteus medius muscle- (present on admission)   Assessment & Plan    Recurrent issue since sacral fx repair in December 2018. Has had multiple IR drainage and completed antibx.  - S/P CT guided abscess drainage on 5/30; 2 mL removed  - hardware removal on 6/5  MRI on 6/7 with interval progression of supra and infratentorial intra--axial nodules and associated edema.  New right thalamus lesion this most likely are related to infection rather than malignancy neurosurgery evaluation recommended to continue IV antibiotics for 2 weeks and repeat MRI brain at this time no intervention is recommended  Mult blood cultures neg  CHAS neg 3/15 and 5/24  LTBI vs CNS TB, new  6/17/2019: Repeat CT hip today showing stable RIGHT iliac muscle fluid collections,BILATERAL sacrum and LEFT iliac bone suspicious for osteomyelitis,Changes at L5-S1 suspicious for discitis osteomyelitis.  Hyperdense RIGHT gluteal lesion moderately suspicious for pseudoaneurysm with adjacent hematoma might need vascular consult when more stable.     Sepsis (HCC)   Assessment & Plan    This is sepsis (without associated acute organ dysfunction).  Probably from her retained hip hardware.  Blood cultures negative  Sepsis is resolved.  Continue on Cefepime, Vancomycin, Diflucan as per ID           Brain lesion- (present on admission)   Assessment & Plan    Repeat MRI on 6/21/2018.  Continue IV antibiotics per ID.     Hypomagnesemia- (present on admission)   Assessment & Plan    Replacing with IV magnesium  Hypokalemia replacing with K. Dur twice daily follow-up in a.m.     RLS (restless legs syndrome)- (present on admission)   Assessment &  Plan    Continue on pramipexole.  Tolerating well     Chronic pain- (present on admission)   Assessment & Plan    Primarily located at left hip.  Pain is stable on pain medication     Malnutrition (HCC)   Assessment & Plan    Moderate       History of DVT (deep vein thrombosis)- (present on admission)   Assessment & Plan    On xarelto, continue monitoring hemoglobin if dropping will hold Xarelto due to findings on CT if possible gluteal lesion moderately suspicious for pseudoaneurysm with adjacent hematoma.       Hypercalcemia- (present on admission)   Assessment & Plan    Resolved     Essential hypertension- (present on admission)   Assessment & Plan      Continue on losartan, metoprolol, and amlodipine.      Chronic hyponatremia- (present on admission)   Assessment & Plan    Stable, continue monitoring, 135 today     History of breast cancer- (present on admission)   Assessment & Plan    S/p left mastectomy and breast implant  Needs follow-up as outpatient     COPD (chronic obstructive pulmonary disease) (HCC)- (present on admission)   Assessment & Plan    Not on any exacerbation at this time, continue RT per protocol          VTE prophylaxis: xarelto    Condition guarded  Medically complex and high risk for complication case.  I certify that patient requires continued medically necessary hospital service for treatment of her current infection in her iliac muscle and probably brain abscess patient is on IV antibiotics and will probably require repeat drainage and imaging evaluations before discharge patient will remain in hospital until stable for discharge.

## 2019-06-17 NOTE — ASSESSMENT & PLAN NOTE
Right-sided craniotomy for resection of superficial mass  6/28/2019  Continue Isoniazid, Rifampin, Ethambutol, Pyrazinamide per ID, discussed with ID they have reached out to [facility] to discuss outpatient regimen  Continue Keppra for seizure prophylaxis, per ID consider restarting steroids if she has breakthrough seizures  No seizures observed

## 2019-06-17 NOTE — THERAPY
"Occupational Therapy Treatment completed with focus on ADLs, ADL transfers, patient education and upper extremity function.  Functional Status: Pt seen today for OT treatment. She was pleasant and cooperative when resting, however when up with functional mobility she becomes very anxious to where she needs max cueing from therapist to calm herself and focus on task at hand. Able to don socks in supine with supv. Francesca bed mobility. Francesca sit<>stand with FWW. Francesca functional mobility with FWW to the bathroom where she attempted to urinate but was too anxious and in pain, needing to be assisted back to bed. Pt educated on relaxation techniques and breathing during activity to ease anxiety. Utilized motivational interviewing to help patient focus on her goals. She is making progress, but remains limited by weakness, fatigue, impaired balance, and anxiety which impacts independence in self care and functional mobility.  Plan of Care: Will benefit from Occupational Therapy 3 times per week  Discharge Recommendations:  Equipment Will Continue to Assess for Equipment Needs. Recommend post-acute placement for additional occupational therapy services prior to discharge home. Patient can tolerate post-acute therapies at a 5x/week frequency.      See \"Rehab Therapy-Acute\" Patient Summary Report for complete documentation.   "

## 2019-06-17 NOTE — CARE PLAN
Problem: Safety  Goal: Will remain free from injury  Outcome: PROGRESSING AS EXPECTED  Pt. Asks for assistance when needed.

## 2019-06-17 NOTE — THERAPY
"Physical Therapy Treatment completed.   Bed Mobility:  Supine to Sit: Supervised (with bed features)  Transfers: Sit to Stand: Moderate Assist  Gait: Level Of Assist: Minimal Assist with Front-Wheel Walker       Plan of Care: Will benefit from Physical Therapy 3 times per week  Discharge Recommendations: Equipment: Will Continue to Assess for Equipment Needs. Post-acute therapy: see below.    See \"Rehab Therapy-Acute\" Patient Summary Report for complete documentation.     Patient continues to be limited by pain. Also appears to be limited by anxiety regarding movement and question cognition. She performed bed mobility at supervision level and bed features, she is unable to sit comfortably, leans to R side to avoid pressure on L hip. She ambulated approximately 2 feet with FWW and min A, unable to continue due to pain. Continue to recommend post acute placement. Will continue to follow during hospital stay.  "

## 2019-06-17 NOTE — THERAPY
Physical Therapy Contact Note    PT treatment attempted. Patient declined due to pain and had just returned to bed after working with OT. Will re attempt as appropriate and able.    Steph Zapata, PT, DPT  [phone]

## 2019-06-17 NOTE — CARE PLAN
Problem: Infection  Goal: Will remain free from infection  Outcome: PROGRESSING AS EXPECTED  Patient wound shows not sign of redness, heat or discharge.

## 2019-06-17 NOTE — PROGRESS NOTES
Infectious Disease Progress Note    Author: Bakari Kovacs D.O. Date & Time of service: 2019  9:35 AM    Chief Complaint:  Brain abscess, iliopsoas abscess, leukopenia      Interval History:  68 y.o. female admitted 2019 as a transfer from [facility] since 2019. + diabetes, SANTOSH of right hip with hardware, and breast cancer who was originally admitted to [facility] on 2019 for right hip and pelvic pain.  Work-up revealed a right iliopsoas lesion, gluteal abscess, and mult brain abscesses  2019-no fevers.  Continues to complain of significant pain in her hips.  Pain medications are being adjusted.  2019-no fevers.  Continues to remain off the antibiotics.  Awaiting Ortho evaluation  2019 patient febrile up to 103.  Having shaking chills.  Is not feeling well.  Denies any specific complaints.  6/10 AF WBC 5.3 somnolent No fever or new complaints   AF WBC 9 No fever or chills-some pain at surgical site. Limited participation with PT noted   AF WBC 7.3 somnolent but arousable-no new complaints   afebrile WBC 9.1 patient complaining of left hip pain.  Denies any headaches   afebrile, no CBC today.  Reports no new issues, tolerating antibiotics.  States the left hip pain is unchanged.   afebrile, WBC 6.9, HR 80s, -150s, on room air. Repeat CT on  showed no change in right iliac fluid collection, changes in right iliac/bilateral sacrum and left iliac suspicious of OM.     Labs Reviewed, Medications Reviewed, and Wound Reviewed.    Review of Systems:  Review of Systems   Constitutional: Negative for chills and fever.   Respiratory: Negative for cough and shortness of breath.    Gastrointestinal: Negative for abdominal pain, diarrhea, nausea and vomiting.   Musculoskeletal: Positive for joint pain.        Left hip   Neurological: Negative for dizziness and headaches.   All other systems reviewed and are negative.  Unchanged    Hemodynamics:  Temp (24hrs), Av.8 °C  (98.2 °F), Min:36.4 °C (97.5 °F), Max:37 °C (98.6 °F)  Temperature: 37 °C (98.6 °F)  Pulse  Av.9  Min: 60  Max: 125  Blood Pressure : 128/65       Physical Exam:  Physical Exam   Constitutional: No distress.   Thin   HENT:   Mouth/Throat: No oropharyngeal exudate.   Eyes: Conjunctivae are normal. No scleral icterus.   Neck: Neck supple. No JVD present.   Cardiovascular: Normal rate and regular rhythm.    No murmur heard.  Pulmonary/Chest: Effort normal and breath sounds normal. No stridor. She has no wheezes. She has no rales.   Abdominal: Soft. She exhibits no distension. There is no tenderness.   Musculoskeletal: She exhibits tenderness. She exhibits no edema.   Right hip with dressing.  No surrounding erythema   Lymphadenopathy:     She has no cervical adenopathy.   Neurological: She is alert.   No gross focal neuro deficit   Skin: Skin is warm. No rash noted. She is not diaphoretic.   Psychiatric: She has a normal mood and affect. Her behavior is normal.   Very pleasant   Nursing note and vitals reviewed.  No change compared to     Meds:    Current Facility-Administered Medications:   •  potassium chloride SA  •  oxyCODONE immediate-release  •  sucralfate  •  vancomycin  •  rivaroxaban  •  Pharmacy Consult Request  •  magnesium oxide  •  fluconazole  •  MD Alert...Vancomycin per Pharmacy  •  cefepime  •  acetaminophen  •  LORazepam  •  insulin regular **AND** Accu-Chek ACHS **AND** NOTIFY MD and PharmD **AND** glucose **AND** dextrose 10% bolus  •  NS  •  pramipexole  •  lidocaine  •  temazepam  •  cyclobenzaprine  •  Respiratory Care per Protocol  •  amLODIPine  •  cinacalcet  •  losartan  •  acetaminophen  •  atorvastatin  •  omeprazole  •  metoprolol  •  senna-docusate **AND** polyethylene glycol/lytes **AND** magnesium hydroxide **AND** bisacodyl  •  ondansetron  •  ondansetron    Labs:  No results for input(s): WBC, RBC, HEMOGLOBIN, HEMATOCRIT, MCV, MCH, RDW, PLATELETCT, MPV, NEUTSPOLYS,  "LYMPHOCYTES, MONOCYTES, EOSINOPHILS, BASOPHILS, RBCMORPHOLO in the last 72 hours.  Recent Labs      06/15/19   0056  06/16/19   0243  06/17/19   0157   SODIUM  130*  132*  135   POTASSIUM  3.7  3.0*  2.9*   CHLORIDE  99  101  100   CO2  21  26  25   GLUCOSE  80  91  125*   BUN  12  10  11     Recent Labs      06/15/19   0056  06/16/19   0243  06/17/19   0157   CREATININE  0.40*  0.41*  0.50       Imaging:  MRI of the brain on 6/8/2019  Impression       1.  Interval progression of supra and infratentorial intra-axial nodules and associated edema. This short-term interval progression favors infection over neoplasm though both remain considerations.  2.  Mild atrophy         Micro:  Results     Procedure Component Value Units Date/Time    URINALYSIS [[id number]]  (Abnormal) Collected:  06/10/19 1010    Order Status:  Completed Specimen:  Urine from Urine, Clean Catch Updated:  06/10/19 1053     Color Yellow     Character Clear     Specific Gravity 1.009     Ph 7.0     Glucose Negative mg/dL      Ketones Negative mg/dL      Protein Negative mg/dL      Bilirubin Negative     Urobilinogen, Urine 0.2     Nitrite Negative     Leukocyte Esterase Trace (A)     Occult Blood Negative     Micro Urine Req Microscopic    Narrative:       Collected By:[id number] SANAM MICHAELS          Assessment:  69 yo female with:  Gluteal and iliopsoas abscess  Brain abscesses vs mets  Breast cancer  DM2    Plan:  Brain abscesses vs mets.  Persistent with interval progression on last MRI  MRI 4/23 \"supra and infratentorial brain parenchyma. Decrease in the size of the lesions. Interval reduction in the extent of the surrounding white matter edema consistent with improvement/treatment response of the most of the multifocal brain abscesses.  MRI 5/21 showed innumerable microabscesses vs mets  Abx (vancomycin, cefepime and Flagyl) discontinued on 5/23 after ~8 weeks of treatment-developed new fevers on 6/4  MRI on 6/8 with interval progression of " "supra and infratentorial intra--axial nodules and associated edema.  New right thalamus lesion. Radiology favors infection over neoplasm though both remain considerations (had been off abx)  Neurosurgery evaluation - recommended continuing antibiotics for another 2 weeks and then repeating MRI brain, as the lesions are not amenable to biopsy   Mult blood cultures neg  CHAS neg 3/15 and 5/24  +QuantiGold as above  Continue vanco/cefepime  Fluconazole 800 mg daily was added on 6/9 and is being continued at this time  Monitor renal function and Vanco trough  Vanco trough 18.6 6/15  Plan for repeat MRI around 6/21/2019 to assess for improvement  Will need to reassess need for biopsy if persistent or worse, and sent for pathology, regular, AFB, fungal cultures     Latent TB  As CNS lesions previously improved on nontuberculosis treatment, continuing as above  Repeat MRI in 6/8 with interval progression   If progression noted on repeat MRI brain or pelvis CT, will need biopsy with pathology, AFB, fungal, bacterial cultures    Fever of unknown origin.  Resolved  Fever and chills 6/4  Cultures negative  Now afebrile  QuantiGold+. CXR neg    Gluteal and iliopsoas abscess s/p treatment.   Adjacent to hardware in right hip (placed 12/17/18)  CT 4/23 \"Fluid collections within the right gluteal and iliacis muscles.. The collection within the right gluteal muscle has unchanged while the fluid collection within the right iliacis muscle is increased somewhat in size.\" 2.7 cm  Cultures 3/29 and 4/4 neg  MRI on 5/20/2019 - interval decrease in collection in R gluteal muscle and persistent multiloculated collection in R iliacus muscle.   CT 5/28 no change  s/p drainage on 5/30/2019-cultures negative  S/p removal hardware 6/5-no cultures done  Panculture neg  1, 3 beta glucan neg  S/p removal hardware 6/5/19  CT on 6/8 with residual abscess in the right iliacus muscle, 2.5 x 1.8 cm, slightly smaller than prior  Repeat CT 6/17 showed " persistent fluid collections. I reviewed CT with radiology, Dr. Brunner and the right iliac fluid collection seems amenable for aspiration. We will proceed for CT-guided aspiration of the fluid collection. Send fluid for gram stain, routine culture, AFB/fungal smear/culture.     Type 2 DM  Hemoglobin A1c 6.3   Keep BS under 150 to help control current infection    Discussed with Dr. Barkley, [facility]    ID will follow.  Please call with questions.

## 2019-06-17 NOTE — PROGRESS NOTES
"Assumed care at 19:00. Patient is calm, content and lying in bed. No complaints of pain. Assessment complete. Labs reviewed. Patient and RN discussed plan of care. Patient questions answered. Patient needs are met at this time. Bed in lowest and locked position. Call light is within reach. Hourly rounding in place. /55   Pulse 84   Temp 36.4 °C (97.5 °F) (Temporal)   Resp 17   Ht 1.575 m (5' 2\")   Wt 59.2 kg (130 lb 8.2 oz)   SpO2 93%   Breastfeeding? No   BMI 23.87 kg/m²   "

## 2019-06-17 NOTE — PROGRESS NOTES
"Pharmacy Kinetics 68 y.o. female on vancomycin day # 14  2019    Currently Dose: Vancomycin 1200 mg iv q24hr (~21 mg/kg)  Received Load Dose: Yes     Indication for Treatment: brain abscess + iliopsoas abscess  ID Service Following: Yes     Pertinent history per medical record: Admitted on 2019 for pelvic abscess (LTACH transfer) after recent admission at [facility] ([place], [place]). Concerns for brain and iliopsoas abscess. NSG and ID consulted (I&D per IR 19; hardware removal 19).      Other antibiotics: cefepime 2 gm iv q12h, fluconazole 800 mg po qd     Allergies: Patient has no known allergies.      List concerns for accumulation of vancomycin: age 68, low body weight, abnormal LFT's, BUN:SCr ~20:1     Pertinent cultures to date:   19: C.Diff: Negative   19: PBCx2: NGTD   19: Pelvic abscess,BF: NGTD      MRSA nares swab if pneumonia is a concern (ordered/positive/negative/n-a): n/a    Recent Labs      06/15/19   0056  19   0243  19   0157   BUN  12  10  11   CREATININE  0.40*  0.41*  0.50      6/10/2019 11:22 2019 11:35 2019 08:12 6/15/2019 12:34 2019 12:06   Vancomycin Trough 21.6 (H) 47.1 (H) 15.5 18.6 16.9     Intake/Output Summary (Last 24 hours) at 19 0843  Last data filed at 19 0914   Gross per 24 hour   Intake                0 ml   Output              200 ml   Net             -200 ml      /73   Pulse 98   Temp 36.8 °C (98.2 °F) (Temporal)   Resp 18   Ht 1.575 m (5' 2\")   Wt 59.2 kg (130 lb 8.2 oz)   SpO2 91%  Temp (24hrs), Av.7 °C (98 °F), Min:36.4 °C (97.5 °F), Max:36.9 °C (98.4 °F)    Estimated Creatinine Clearance: 82.8 mL/min (by C-G formula based on SCr of 0.5 mg/dL).    A/P   1. Vancomycin dose change: not indicated   2. Next vancomycin level: ~2-3 days  3. Goal trough: 18-22 mcg/mL  4. Comments: VS stable. Afebrile. No new CBC. CrCl ~83 mL/min (slight SCr increase). No new microbiology. Concerns for accumulation of " vancomycin noted. Most recent vancomycin level below goal and drawn appropriately. Likely to accumulate to goal range with extended vancomycin exposure. Likely to repeat vancomycin trough in ~2-3 days pending clinical course. Pharmacy will continue to follow.    Kulwant Adhikari, PharmD

## 2019-06-18 ENCOUNTER — APPOINTMENT (OUTPATIENT)
Dept: RADIOLOGY | Facility: MEDICAL CENTER | Age: 69
DRG: 356 | End: 2019-06-18
Attending: INTERNAL MEDICINE
Payer: MEDICARE

## 2019-06-18 PROBLEM — T81.718A PSEUDOANEURYSM FOLLOWING PROCEDURE (HCC): Status: ACTIVE | Noted: 2019-06-18

## 2019-06-18 PROBLEM — D68.318 ACQUIRED CIRCULATING ANTICOAGULANTS (HCC): Status: ACTIVE | Noted: 2019-06-18

## 2019-06-18 PROBLEM — I72.9 PSEUDOANEURYSM FOLLOWING PROCEDURE (HCC): Status: ACTIVE | Noted: 2019-06-18

## 2019-06-18 LAB
ANION GAP SERPL CALC-SCNC: 7 MMOL/L (ref 0–11.9)
BASOPHILS # BLD AUTO: 0.7 % (ref 0–1.8)
BASOPHILS # BLD: 0.05 K/UL (ref 0–0.12)
BUN SERPL-MCNC: 11 MG/DL (ref 8–22)
CALCIUM SERPL-MCNC: 8.1 MG/DL (ref 8.5–10.5)
CHLORIDE SERPL-SCNC: 101 MMOL/L (ref 96–112)
CO2 SERPL-SCNC: 24 MMOL/L (ref 20–33)
CREAT SERPL-MCNC: 0.42 MG/DL (ref 0.5–1.4)
EOSINOPHIL # BLD AUTO: 0.27 K/UL (ref 0–0.51)
EOSINOPHIL NFR BLD: 3.9 % (ref 0–6.9)
ERYTHROCYTE [DISTWIDTH] IN BLOOD BY AUTOMATED COUNT: 61 FL (ref 35.9–50)
ERYTHROCYTE [SEDIMENTATION RATE] IN BLOOD BY WESTERGREN METHOD: 53 MM/HOUR (ref 0–30)
GLUCOSE SERPL-MCNC: 102 MG/DL (ref 65–99)
HCT VFR BLD AUTO: 35.7 % (ref 37–47)
HGB BLD-MCNC: 10.9 G/DL (ref 12–16)
IMM GRANULOCYTES # BLD AUTO: 0.03 K/UL (ref 0–0.11)
IMM GRANULOCYTES NFR BLD AUTO: 0.4 % (ref 0–0.9)
INR PPP: 1.17 (ref 0.87–1.13)
LYMPHOCYTES # BLD AUTO: 1.12 K/UL (ref 1–4.8)
LYMPHOCYTES NFR BLD: 16.2 % (ref 22–41)
MAGNESIUM SERPL-MCNC: 1.4 MG/DL (ref 1.5–2.5)
MCH RBC QN AUTO: 27.8 PG (ref 27–33)
MCHC RBC AUTO-ENTMCNC: 30.5 G/DL (ref 33.6–35)
MCV RBC AUTO: 91.1 FL (ref 81.4–97.8)
MONOCYTES # BLD AUTO: 0.79 K/UL (ref 0–0.85)
MONOCYTES NFR BLD AUTO: 11.4 % (ref 0–13.4)
NEUTROPHILS # BLD AUTO: 4.66 K/UL (ref 2–7.15)
NEUTROPHILS NFR BLD: 67.4 % (ref 44–72)
NRBC # BLD AUTO: 0 K/UL
NRBC BLD-RTO: 0 /100 WBC
PLATELET # BLD AUTO: 302 K/UL (ref 164–446)
PMV BLD AUTO: 9.1 FL (ref 9–12.9)
POTASSIUM SERPL-SCNC: 3.9 MMOL/L (ref 3.6–5.5)
PROTHROMBIN TIME: 15.2 SEC (ref 12–14.6)
RBC # BLD AUTO: 3.92 M/UL (ref 4.2–5.4)
SODIUM SERPL-SCNC: 132 MMOL/L (ref 135–145)
WBC # BLD AUTO: 6.9 K/UL (ref 4.8–10.8)

## 2019-06-18 PROCEDURE — A9270 NON-COVERED ITEM OR SERVICE: HCPCS | Performed by: NURSE PRACTITIONER

## 2019-06-18 PROCEDURE — 770006 HCHG ROOM/CARE - MED/SURG/GYN SEMI*

## 2019-06-18 PROCEDURE — 700102 HCHG RX REV CODE 250 W/ 637 OVERRIDE(OP): Performed by: HOSPITALIST

## 2019-06-18 PROCEDURE — 85025 COMPLETE CBC W/AUTO DIFF WBC: CPT

## 2019-06-18 PROCEDURE — 700102 HCHG RX REV CODE 250 W/ 637 OVERRIDE(OP): Performed by: INTERNAL MEDICINE

## 2019-06-18 PROCEDURE — 97110 THERAPEUTIC EXERCISES: CPT

## 2019-06-18 PROCEDURE — 700111 HCHG RX REV CODE 636 W/ 250 OVERRIDE (IP): Performed by: HOSPITALIST

## 2019-06-18 PROCEDURE — 700111 HCHG RX REV CODE 636 W/ 250 OVERRIDE (IP): Performed by: INTERNAL MEDICINE

## 2019-06-18 PROCEDURE — 85610 PROTHROMBIN TIME: CPT

## 2019-06-18 PROCEDURE — 85652 RBC SED RATE AUTOMATED: CPT

## 2019-06-18 PROCEDURE — 700105 HCHG RX REV CODE 258: Performed by: INTERNAL MEDICINE

## 2019-06-18 PROCEDURE — A9270 NON-COVERED ITEM OR SERVICE: HCPCS | Performed by: INTERNAL MEDICINE

## 2019-06-18 PROCEDURE — A9270 NON-COVERED ITEM OR SERVICE: HCPCS | Performed by: HOSPITALIST

## 2019-06-18 PROCEDURE — 80048 BASIC METABOLIC PNL TOTAL CA: CPT

## 2019-06-18 PROCEDURE — 36415 COLL VENOUS BLD VENIPUNCTURE: CPT

## 2019-06-18 PROCEDURE — 99232 SBSQ HOSP IP/OBS MODERATE 35: CPT | Performed by: INTERNAL MEDICINE

## 2019-06-18 PROCEDURE — 700102 HCHG RX REV CODE 250 W/ 637 OVERRIDE(OP): Performed by: NURSE PRACTITIONER

## 2019-06-18 PROCEDURE — 700105 HCHG RX REV CODE 258: Performed by: HOSPITALIST

## 2019-06-18 PROCEDURE — 83735 ASSAY OF MAGNESIUM: CPT

## 2019-06-18 PROCEDURE — 700101 HCHG RX REV CODE 250: Performed by: NURSE PRACTITIONER

## 2019-06-18 RX ORDER — LACTOBACILLUS RHAMNOSUS GG 10B CELL
1 CAPSULE ORAL
Status: DISCONTINUED | OUTPATIENT
Start: 2019-06-19 | End: 2019-08-10 | Stop reason: HOSPADM

## 2019-06-18 RX ORDER — MAGNESIUM SULFATE HEPTAHYDRATE 40 MG/ML
2 INJECTION, SOLUTION INTRAVENOUS ONCE
Status: COMPLETED | OUTPATIENT
Start: 2019-06-18 | End: 2019-06-18

## 2019-06-18 RX ADMIN — POTASSIUM CHLORIDE 40 MEQ: 20 TABLET, EXTENDED RELEASE ORAL at 16:31

## 2019-06-18 RX ADMIN — CEFEPIME 2 G: 2 INJECTION, POWDER, FOR SOLUTION INTRAVENOUS at 05:28

## 2019-06-18 RX ADMIN — METOPROLOL TARTRATE 25 MG: 25 TABLET, FILM COATED ORAL at 16:31

## 2019-06-18 RX ADMIN — ACETAMINOPHEN 1000 MG: 500 TABLET ORAL at 05:27

## 2019-06-18 RX ADMIN — PRAMIPEXOLE DIHYDROCHLORIDE 1 MG: 0.5 TABLET ORAL at 05:27

## 2019-06-18 RX ADMIN — METOPROLOL TARTRATE 25 MG: 25 TABLET, FILM COATED ORAL at 05:27

## 2019-06-18 RX ADMIN — TEMAZEPAM 15 MG: 15 CAPSULE ORAL at 20:23

## 2019-06-18 RX ADMIN — FLUCONAZOLE 800 MG: 200 TABLET ORAL at 05:27

## 2019-06-18 RX ADMIN — VANCOMYCIN HYDROCHLORIDE 1200 MG: 100 INJECTION, POWDER, LYOPHILIZED, FOR SOLUTION INTRAVENOUS at 12:26

## 2019-06-18 RX ADMIN — SUCRALFATE 1 G: 1 SUSPENSION ORAL at 12:26

## 2019-06-18 RX ADMIN — MAGNESIUM SULFATE IN WATER 2 G: 40 INJECTION, SOLUTION INTRAVENOUS at 09:52

## 2019-06-18 RX ADMIN — OXYCODONE HYDROCHLORIDE 5 MG: 5 TABLET ORAL at 09:52

## 2019-06-18 RX ADMIN — SENNOSIDES AND DOCUSATE SODIUM 2 TABLET: 8.6; 5 TABLET ORAL at 05:27

## 2019-06-18 RX ADMIN — ACETAMINOPHEN 1000 MG: 500 TABLET ORAL at 16:31

## 2019-06-18 RX ADMIN — OMEPRAZOLE 20 MG: 20 CAPSULE, DELAYED RELEASE ORAL at 05:28

## 2019-06-18 RX ADMIN — CEFEPIME 2 G: 2 INJECTION, POWDER, FOR SOLUTION INTRAVENOUS at 16:32

## 2019-06-18 RX ADMIN — LOSARTAN POTASSIUM 50 MG: 50 TABLET ORAL at 05:27

## 2019-06-18 RX ADMIN — SUCRALFATE 1 G: 1 SUSPENSION ORAL at 16:31

## 2019-06-18 RX ADMIN — PRAMIPEXOLE DIHYDROCHLORIDE 1 MG: 0.5 TABLET ORAL at 12:26

## 2019-06-18 RX ADMIN — POTASSIUM CHLORIDE 40 MEQ: 20 TABLET, EXTENDED RELEASE ORAL at 05:27

## 2019-06-18 RX ADMIN — PRAMIPEXOLE DIHYDROCHLORIDE 1 MG: 0.5 TABLET ORAL at 16:31

## 2019-06-18 RX ADMIN — LIDOCAINE 2 PATCH: 50 PATCH CUTANEOUS at 12:25

## 2019-06-18 RX ADMIN — MAGNESIUM OXIDE TAB 400 MG (241.3 MG ELEMENTAL MG) 400 MG: 400 (241.3 MG) TAB at 05:27

## 2019-06-18 RX ADMIN — CINACALCET HYDROCHLORIDE 60 MG: 30 TABLET, COATED ORAL at 05:28

## 2019-06-18 RX ADMIN — SUCRALFATE 1 G: 1 SUSPENSION ORAL at 05:27

## 2019-06-18 RX ADMIN — AMLODIPINE BESYLATE 10 MG: 5 TABLET ORAL at 05:27

## 2019-06-18 RX ADMIN — OXYCODONE HYDROCHLORIDE 5 MG: 5 TABLET ORAL at 00:51

## 2019-06-18 RX ADMIN — ATORVASTATIN CALCIUM 10 MG: 10 TABLET, FILM COATED ORAL at 16:31

## 2019-06-18 ASSESSMENT — COGNITIVE AND FUNCTIONAL STATUS - GENERAL
STANDING UP FROM CHAIR USING ARMS: A LITTLE
SUGGESTED CMS G CODE MODIFIER MOBILITY: CL
WALKING IN HOSPITAL ROOM: A LOT
MOBILITY SCORE: 12
TURNING FROM BACK TO SIDE WHILE IN FLAT BAD: A LITTLE
MOVING FROM LYING ON BACK TO SITTING ON SIDE OF FLAT BED: UNABLE
CLIMB 3 TO 5 STEPS WITH RAILING: A LOT
MOVING TO AND FROM BED TO CHAIR: UNABLE

## 2019-06-18 ASSESSMENT — GAIT ASSESSMENTS: GAIT LEVEL OF ASSIST: REFUSED

## 2019-06-18 NOTE — PROGRESS NOTES
Aaox4, Swedish speaking but understands some english, denies any discomfort, WBAT per MD Ken aware of  CT results, POC discussed, needs attended.

## 2019-06-18 NOTE — PROGRESS NOTES
"Pharmacy Kinetics 68 y.o. female on vancomycin day # 15  2019    Currently Dose: Vancomycin 1200 mg iv q24hr (~21 mg/kg)  Received Load Dose: Yes     Indication for Treatment: brain abscess + iliopsoas abscess  ID Service Following: Yes     Pertinent history per medical record: Admitted on 2019 for pelvic abscess (LTACH transfer) after recent admission at [facility] ([place] Concerns for brain and iliopsoas abscess. NSG and ID consulted (I&D per IR 19; hardware removal 19).      Other antibiotics: cefepime 2 gm iv q12h, fluconazole 800 mg po qd     Allergies: Patient has no known allergies.      List concerns for accumulation of vancomycin: age 68, low body weight, abnormal LFT's, BUN:SCr ~20:1     Pertinent cultures to date:   19: C.Diff: Negative   19: PBCx2: NGTD   19: Pelvic abscess,BF: NGTD      MRSA nares swab if pneumonia is a concern (ordered/positive/negative/n-a): n/a    Recent Labs      19   0334   WBC  6.9   NEUTSPOLYS  67.40     Recent Labs      19   0243  19   0157  19   0334   BUN  10  11  11   CREATININE  0.41*  0.50  0.42*     Recent Labs      06/15/19   1234  19   1206   [facility]  18.6  16.9     Intake/Output Summary (Last 24 hours) at 19 0744  Last data filed at 19 1400   Gross per 24 hour   Intake              480 ml   Output                0 ml   Net              480 ml      /74   Pulse 86   Temp 36.8 °C (98.3 °F) (Temporal)   Resp 18   Ht 1.575 m (5' 2\")   Wt 59.2 kg (130 lb 8.2 oz)   SpO2 91%  Temp (24hrs), Av.9 °C (98.4 °F), Min:36.7 °C (98 °F), Max:37.1 °C (98.8 °F)    Estimated Creatinine Clearance: 98.6 mL/min (A) (by C-G formula based on SCr of 0.42 mg/dL (L)).    A/P   1. Vancomycin dose change: not indicated   2. Next vancomycin level: 19 @1230 (ordered)  3. Goal trough: 18-22 mcg/mL  4. Comments: VS stable. Afebrile. No new CBC. CrCl ~99 mL/min (SCr improved). No new microbiology. " Concerns for accumulation of vancomycin noted. Most recent vancomycin level noted. Vancomycin trough in place prior to PM dose 6/19/19 to assess clearance. BMP with AM labs. Pharmacy will continue to follow.    Kulwant Adhikari, PharmD

## 2019-06-18 NOTE — PROGRESS NOTES
Hospital Medicine Daily Progress Note    Date of Service  6/18/2019    Chief Complaint  persistent abscess.    Hospital Course     Ms. Martini is a 68-year-old female who was transferred from [facility] on 5/29/2019 with persistent right gluteal abscesses since sacral fracture repair in December.  She has had multiple IR drainage.  She initially presented with pelvic pain, low back pain, and confusion. She also had new slurred speech. Imaging of the abdomen, pelvis and brain revealed abscesses and she was treated with a full course of IV antibiotics per ID.  Her slurred speech improved and she was able to ambulate with physical therapy. However, repeat imaging showed persistent pelvic abscesses.  MRI brain shows increase in brain lesions.  Her brain lesions are known from prior imaging and there is concern they represent metastasis.  Oncology aware and do not recommend further imaging. Cultures remained negative and a CHAS done by Dr. Potter showed no vegetations. The size of the pelvic abscesses was reviewed by interventional radiology and the case was discussed with Dr. Finley who recommended transfer to [facility] for CT guided drainage. She underwent drainage on 5/30. On 6/4, she became febrile again, so restarted on cefepime and vancomycin per ID and has been on them since.  Right hip hardware was removed on 6/5. Repeat MRI brain on 6/7/19 saw progression of the supra and infratentorial intra-axial nodules with edema, prompting a Neurosurgery consult.  Dr. Nguyen stated lesions are not amendable to biopsy stereotactically, and favors infectious etiology, recommended continued antibiotics, antineuroleptic and repeat brain MRI in 2 weeks (around 6/21).  Repeat CT pelvis on 6/9 showed residual fluid collection 2.5 x 1.8 cm in right iliacus muscle., slightly smaller than before.  Cefepime + vanc were continue. Work up was broadened to include fungal etiology, and she is started on empiric started fluconazole.        Interval  Problem Update  6/18/2019 - I reviewed the patient's chart. There were no significant overnight events. Remains hemodynamically stable and afebrile. Stable on RA.  Remains without leukocytosis.  Hemoglobin is stable.  Sodium 132.  Magnesium is 1.4.  Potassium is normal.  Repeat CT yesterday showed no significant interval change in the size of the right iliac muscle fluid collections, with changes in the right iliac bone, bilateral sacrum, and left iliac bone suspicious for osteomyelitis, and changes at L5-S1 suspicious for discitis/osteomyelitis, along with hyperdense right gluteal lesion, moderately suspicious for pseudoaneurysm with adjacent hematoma.    > I have personally seen and examined the patient today.  She appears to be comfortable.  She denies any pain.  She does have some loose stools, but no diarrhea.  She is passing gas.  No fevers or chills.  No chest pain or shortness of breath.      Consultants/Specialty  Infectious disease  Orthopedic surgery  Neurosurgery, Dr. Nguyen  IR    Code Status  DNR/DNI    Disposition  Monitor on the floors.   Anticipate return to LTAC once medically cleared.    Review of Systems  Review of Systems        Pertinent positives/negatives as mentioned above.     A complete review of systems was personally done by me. All other systems were negative.       Physical Exam  Temp:  [36.4 °C (97.6 °F)-37.1 °C (98.8 °F)] 36.4 °C (97.6 °F)  Pulse:  [74-86] 74  Resp:  [16-18] 18  BP: (115-156)/(60-89) 134/89  SpO2:  [91 %-95 %] 95 %    Physical Exam   Constitutional: She is oriented to person, place, and time. She appears well-developed and well-nourished. No distress.   HENT:   Head: Normocephalic and atraumatic.   Mouth/Throat: Oropharynx is clear and moist. No oropharyngeal exudate.   Eyes: Pupils are equal, round, and reactive to light. Conjunctivae are normal. No scleral icterus.   Neck: Normal range of motion. Neck supple.   Cardiovascular: Normal rate and regular rhythm.  Exam  reveals no gallop and no friction rub.    No murmur heard.  Pulmonary/Chest: Effort normal and breath sounds normal. No respiratory distress. She has no wheezes. She has no rales. She exhibits no tenderness.   Abdominal: Soft. Bowel sounds are normal. She exhibits no distension. There is no tenderness. There is no rebound and no guarding.   Musculoskeletal: She exhibits no edema.   Limited range of motion of the left hip more than the right.  Minimal tenderness.   Lymphadenopathy:     She has no cervical adenopathy.   Neurological: She is alert and oriented to person, place, and time. No cranial nerve deficit.   Skin: Skin is warm and dry. No rash noted. She is not diaphoretic. No erythema. No pallor.   Psychiatric: She has a normal mood and affect. Her behavior is normal. Judgment and thought content normal.   Nursing note and vitals reviewed.        Fluids    Intake/Output Summary (Last 24 hours) at 06/18/19 1337  Last data filed at 06/18/19 1245   Gross per 24 hour   Intake              720 ml   Output                0 ml   Net              720 ml       Laboratory  Recent Labs      06/17/19   1206  06/18/19   0334   WBC   --   6.9   RBC   --   3.92*   HEMOGLOBIN  11.1*  10.9*   HEMATOCRIT  34.3*  35.7*   MCV   --   91.1   MCH   --   27.8   MCHC   --   30.5*   RDW   --   61.0*   PLATELETCT   --   302   MPV   --   9.1     Recent Labs      06/16/19   0243  06/17/19   0157  06/18/19   0334   SODIUM  132*  135  132*   POTASSIUM  3.0*  2.9*  3.9   CHLORIDE  101  100  101   CO2  26  25  24   GLUCOSE  91  125*  102*   BUN  10  11  11   CREATININE  0.41*  0.50  0.42*   CALCIUM  8.3*  7.8*  8.1*                   Imaging    Assessment/Plan  * Abscess of right iliac muscle and right gluteus medius muscle- (present on admission)   Assessment & Plan    - Recurrent issue since sacral fx repair in December 2018. Has had multiple IR drainage and antibiotics. Recent cultures remain unrevealing.    - I discussed with IR, too high  risk to drain fluid collection, with very low yield possible. Recommended biopsy of the nonhealing right iliac fracture area instead, discussed and ok with ID. Send for culture, fungal smear and culture, and AFB culture.   - continue current antibiotics with cefepime, Diflucan, and vancomycin.  ID following.     Sepsis (HCC)   Assessment & Plan    -This is sepsis (without associated acute organ dysfunction).  Likely from her abscesses.  Blood cultures negative.  -Sepsis has resolved.  -Continue antibiotics with cefepime, Vancomycin, Diflucan as per ID.  Further work-up as above.  -Continue close hemodynamic monitoring.  Trend WBC count.  Watch for fevers.           Pseudoaneurysm following procedure (HCC)- (present on admission)   Assessment & Plan    -Likely postoperative.  No further interventions needed.  Monitor.     Hypomagnesemia- (present on admission)   Assessment & Plan    -Replace with 2 g IV magnesium.  Repeat magnesium level in the morning.     Hypercalcemia- (present on admission)   Assessment & Plan    -Resolved.     Brain lesion- (present on admission)   Assessment & Plan    - will need repeat MRI on 6/21/2018.  Continue IV antibiotics per ID.     Acquired circulating anticoagulants (HCC)- (present on admission)   Assessment & Plan    - holding DOACs for IR biopsy.     Non-severe protein-calorie malnutrition (HCC)- (present on admission)   Assessment & Plan    -Moderate.  -Continue nutrition support per RD.       History of DVT (deep vein thrombosis)- (present on admission)   Assessment & Plan    -Holding Xarelto for now for IR procedure.     Essential hypertension- (present on admission)   Assessment & Plan    -Good and acceptable control.  Continue Lopressor, Cozaar, and Norvasc.  Monitor blood pressure trend closely.     Chronic hyponatremia- (present on admission)   Assessment & Plan    - remains stable. Continue daily monitoring.      RLS (restless legs syndrome)- (present on admission)    Assessment & Plan    - Continue on pramipexole.      Chronic pain- (present on admission)   Assessment & Plan    - well controlled. Primarily located at left hip.  -Continue as needed oxycodone, Flexeril, and Tylenol.     History of breast cancer- (present on admission)   Assessment & Plan    -S/p left mastectomy and breast implant.  -Needs follow-up as outpatient.     COPD (chronic obstructive pulmonary disease) (HCC)- (present on admission)   Assessment & Plan    - Not on any exacerbation at this time, continue RT protocol.          VTE prophylaxis: xarelto

## 2019-06-18 NOTE — THERAPY
Attempted to see pt for PT tx. pt declining out of bed activity due to fatigue and being out of bed all day. Pt also reporting L hip pain.Reviewed LE therex for strengthening and pregait. Will reattempt as able.    Ashley, PTA  [phone]

## 2019-06-18 NOTE — PROGRESS NOTES
IR Note:  Order for CT guided biopsy of right hip.  Patient taking xarelto (last dose 6/17).  Hold xarelto for 48 hours per radiology guidelines.  Spoke with bedside PATRICK Omalley.

## 2019-06-18 NOTE — CARE PLAN
Problem: Knowledge Deficit  Goal: Knowledge of disease process/condition, treatment plan, diagnostic tests, and medications will improve    Intervention: Explain information regarding disease process/condition, treatment plan, diagnostic tests, and medications and document in education  PT/OT, WBAT      Problem: Pain Management  Goal: Pain level will decrease to patient's comfort goal    Intervention: Follow pain managment plan developed in collaboration with patient and Interdisciplinary Team  Prn meds per MAR

## 2019-06-18 NOTE — ASSESSMENT & PLAN NOTE
Anticoagulation on hold given recent brain biopsy.  Discussed with Dr. Mela Kaufman to start AC per surgery .  US legs negative for DVT  Start DVT pplx

## 2019-06-18 NOTE — CARE PLAN
Problem: Safety  Goal: Will remain free from falls    Intervention: Assess risk factors for falls  Fall measures in place. Call light within reach, bed alarm on, personal belongings close-by, bed in lowest position, IV pole on same side of bathroom, upper bed rails up, hourly rounding in place. Will continue to monitor pt for safety.       Problem: Infection  Goal: Will remain free from infection    Intervention: Assess signs and symptoms of infection  Educated pt on need for IV abx treatment.

## 2019-06-18 NOTE — PROGRESS NOTES
· 2 RN skin check completed w/ PATRICK Almaraz   · Devices in place: glasses, PIV.  · Skin assessed under devices: intact.  · Confirmed pressure ulcers found on: n/a .  · New potential pressure ulcers noted on : n/a  · The following interventions are in place: encouraging pt to turn, using bedpan for pt, ensuring pt remains dry. .

## 2019-06-19 ENCOUNTER — APPOINTMENT (OUTPATIENT)
Dept: RADIOLOGY | Facility: MEDICAL CENTER | Age: 69
DRG: 356 | End: 2019-06-19
Attending: INTERNAL MEDICINE
Payer: MEDICARE

## 2019-06-19 LAB
ANION GAP SERPL CALC-SCNC: 9 MMOL/L (ref 0–11.9)
BASOPHILS # BLD AUTO: 0.7 % (ref 0–1.8)
BASOPHILS # BLD: 0.04 K/UL (ref 0–0.12)
BUN SERPL-MCNC: 10 MG/DL (ref 8–22)
CALCIUM SERPL-MCNC: 8 MG/DL (ref 8.5–10.5)
CHLORIDE SERPL-SCNC: 100 MMOL/L (ref 96–112)
CO2 SERPL-SCNC: 23 MMOL/L (ref 20–33)
CREAT SERPL-MCNC: 0.31 MG/DL (ref 0.5–1.4)
EOSINOPHIL # BLD AUTO: 0.31 K/UL (ref 0–0.51)
EOSINOPHIL NFR BLD: 5.3 % (ref 0–6.9)
ERYTHROCYTE [DISTWIDTH] IN BLOOD BY AUTOMATED COUNT: 58.8 FL (ref 35.9–50)
FUNGUS SPEC FUNGUS STN: NORMAL
GLUCOSE SERPL-MCNC: 123 MG/DL (ref 65–99)
GRAM STN SPEC: NORMAL
HCT VFR BLD AUTO: 34.1 % (ref 37–47)
HGB BLD-MCNC: 11.2 G/DL (ref 12–16)
IMM GRANULOCYTES # BLD AUTO: 0.02 K/UL (ref 0–0.11)
IMM GRANULOCYTES NFR BLD AUTO: 0.3 % (ref 0–0.9)
LYMPHOCYTES # BLD AUTO: 1.06 K/UL (ref 1–4.8)
LYMPHOCYTES NFR BLD: 18 % (ref 22–41)
MAGNESIUM SERPL-MCNC: 1.4 MG/DL (ref 1.5–2.5)
MCH RBC QN AUTO: 28.9 PG (ref 27–33)
MCHC RBC AUTO-ENTMCNC: 32.8 G/DL (ref 33.6–35)
MCV RBC AUTO: 87.9 FL (ref 81.4–97.8)
MONOCYTES # BLD AUTO: 0.64 K/UL (ref 0–0.85)
MONOCYTES NFR BLD AUTO: 10.8 % (ref 0–13.4)
NEUTROPHILS # BLD AUTO: 3.83 K/UL (ref 2–7.15)
NEUTROPHILS NFR BLD: 64.9 % (ref 44–72)
NRBC # BLD AUTO: 0 K/UL
NRBC BLD-RTO: 0 /100 WBC
PATHOLOGY CONSULT NOTE: NORMAL
PLATELET # BLD AUTO: 290 K/UL (ref 164–446)
PMV BLD AUTO: 9.6 FL (ref 9–12.9)
POTASSIUM SERPL-SCNC: 3.7 MMOL/L (ref 3.6–5.5)
RBC # BLD AUTO: 3.88 M/UL (ref 4.2–5.4)
RHODAMINE-AURAMINE STN SPEC: NORMAL
SIGNIFICANT IND 70042: NORMAL
SITE SITE: NORMAL
SODIUM SERPL-SCNC: 132 MMOL/L (ref 135–145)
SOURCE SOURCE: NORMAL
VANCOMYCIN TROUGH SERPL-MCNC: 20.1 UG/ML (ref 10–20)
WBC # BLD AUTO: 5.9 K/UL (ref 4.8–10.8)

## 2019-06-19 PROCEDURE — 99232 SBSQ HOSP IP/OBS MODERATE 35: CPT | Performed by: INTERNAL MEDICINE

## 2019-06-19 PROCEDURE — 87116 MYCOBACTERIA CULTURE: CPT

## 2019-06-19 PROCEDURE — 700102 HCHG RX REV CODE 250 W/ 637 OVERRIDE(OP): Performed by: HOSPITALIST

## 2019-06-19 PROCEDURE — A9270 NON-COVERED ITEM OR SERVICE: HCPCS | Performed by: HOSPITALIST

## 2019-06-19 PROCEDURE — 87205 SMEAR GRAM STAIN: CPT | Mod: 91

## 2019-06-19 PROCEDURE — 700102 HCHG RX REV CODE 250 W/ 637 OVERRIDE(OP): Performed by: NURSE PRACTITIONER

## 2019-06-19 PROCEDURE — 700102 HCHG RX REV CODE 250 W/ 637 OVERRIDE(OP): Performed by: INTERNAL MEDICINE

## 2019-06-19 PROCEDURE — 83735 ASSAY OF MAGNESIUM: CPT

## 2019-06-19 PROCEDURE — 87015 SPECIMEN INFECT AGNT CONCNTJ: CPT

## 2019-06-19 PROCEDURE — A9270 NON-COVERED ITEM OR SERVICE: HCPCS | Performed by: INTERNAL MEDICINE

## 2019-06-19 PROCEDURE — 700111 HCHG RX REV CODE 636 W/ 250 OVERRIDE (IP): Performed by: HOSPITALIST

## 2019-06-19 PROCEDURE — 700105 HCHG RX REV CODE 258: Performed by: INTERNAL MEDICINE

## 2019-06-19 PROCEDURE — 700111 HCHG RX REV CODE 636 W/ 250 OVERRIDE (IP): Performed by: INTERNAL MEDICINE

## 2019-06-19 PROCEDURE — 36415 COLL VENOUS BLD VENIPUNCTURE: CPT

## 2019-06-19 PROCEDURE — 80202 ASSAY OF VANCOMYCIN: CPT

## 2019-06-19 PROCEDURE — A9270 NON-COVERED ITEM OR SERVICE: HCPCS | Performed by: NURSE PRACTITIONER

## 2019-06-19 PROCEDURE — 700111 HCHG RX REV CODE 636 W/ 250 OVERRIDE (IP)

## 2019-06-19 PROCEDURE — 770006 HCHG ROOM/CARE - MED/SURG/GYN SEMI*

## 2019-06-19 PROCEDURE — BR2CZZZ COMPUTERIZED TOMOGRAPHY (CT SCAN) OF PELVIS: ICD-10-PCS | Performed by: RADIOLOGY

## 2019-06-19 PROCEDURE — 87102 FUNGUS ISOLATION CULTURE: CPT

## 2019-06-19 PROCEDURE — 87070 CULTURE OTHR SPECIMN AEROBIC: CPT

## 2019-06-19 PROCEDURE — 87206 SMEAR FLUORESCENT/ACID STAI: CPT

## 2019-06-19 PROCEDURE — 80048 BASIC METABOLIC PNL TOTAL CA: CPT

## 2019-06-19 PROCEDURE — 700105 HCHG RX REV CODE 258: Performed by: HOSPITALIST

## 2019-06-19 PROCEDURE — 77012 CT SCAN FOR NEEDLE BIOPSY: CPT

## 2019-06-19 PROCEDURE — 0QB23ZX EXCISION OF RIGHT PELVIC BONE, PERCUTANEOUS APPROACH, DIAGNOSTIC: ICD-10-PCS | Performed by: RADIOLOGY

## 2019-06-19 PROCEDURE — 85025 COMPLETE CBC W/AUTO DIFF WBC: CPT

## 2019-06-19 PROCEDURE — 700101 HCHG RX REV CODE 250: Performed by: NURSE PRACTITIONER

## 2019-06-19 PROCEDURE — 99233 SBSQ HOSP IP/OBS HIGH 50: CPT | Performed by: INTERNAL MEDICINE

## 2019-06-19 RX ORDER — MIDAZOLAM HYDROCHLORIDE 1 MG/ML
INJECTION INTRAMUSCULAR; INTRAVENOUS
Status: COMPLETED
Start: 2019-06-19 | End: 2019-06-19

## 2019-06-19 RX ORDER — MAGNESIUM SULFATE HEPTAHYDRATE 40 MG/ML
2 INJECTION, SOLUTION INTRAVENOUS ONCE
Status: COMPLETED | OUTPATIENT
Start: 2019-06-19 | End: 2019-06-19

## 2019-06-19 RX ORDER — ONDANSETRON 2 MG/ML
4 INJECTION INTRAMUSCULAR; INTRAVENOUS PRN
Status: ACTIVE | OUTPATIENT
Start: 2019-06-19 | End: 2019-06-19

## 2019-06-19 RX ORDER — MORPHINE SULFATE 4 MG/ML
2 INJECTION, SOLUTION INTRAMUSCULAR; INTRAVENOUS
Status: DISCONTINUED | OUTPATIENT
Start: 2019-06-19 | End: 2019-07-09

## 2019-06-19 RX ORDER — MIDAZOLAM HYDROCHLORIDE 1 MG/ML
.5-2 INJECTION INTRAMUSCULAR; INTRAVENOUS PRN
Status: ACTIVE | OUTPATIENT
Start: 2019-06-19 | End: 2019-06-19

## 2019-06-19 RX ORDER — SODIUM CHLORIDE 9 MG/ML
500 INJECTION, SOLUTION INTRAVENOUS
Status: ACTIVE | OUTPATIENT
Start: 2019-06-19 | End: 2019-06-19

## 2019-06-19 RX ADMIN — ACETAMINOPHEN 1000 MG: 500 TABLET ORAL at 05:29

## 2019-06-19 RX ADMIN — MAGNESIUM SULFATE 2 G: 2 INJECTION INTRAVENOUS at 08:54

## 2019-06-19 RX ADMIN — PRAMIPEXOLE DIHYDROCHLORIDE 1 MG: 0.5 TABLET ORAL at 05:29

## 2019-06-19 RX ADMIN — PRAMIPEXOLE DIHYDROCHLORIDE 1 MG: 0.5 TABLET ORAL at 12:34

## 2019-06-19 RX ADMIN — MIDAZOLAM HYDROCHLORIDE 1 MG: 1 INJECTION, SOLUTION INTRAMUSCULAR; INTRAVENOUS at 09:24

## 2019-06-19 RX ADMIN — FLUCONAZOLE 800 MG: 200 TABLET ORAL at 05:29

## 2019-06-19 RX ADMIN — OXYCODONE HYDROCHLORIDE 5 MG: 5 TABLET ORAL at 01:25

## 2019-06-19 RX ADMIN — FENTANYL CITRATE 25 MCG: 50 INJECTION INTRAMUSCULAR; INTRAVENOUS at 09:24

## 2019-06-19 RX ADMIN — MIDAZOLAM HYDROCHLORIDE 1 MG: 1 INJECTION INTRAMUSCULAR; INTRAVENOUS at 09:24

## 2019-06-19 RX ADMIN — PRAMIPEXOLE DIHYDROCHLORIDE 1 MG: 0.5 TABLET ORAL at 17:50

## 2019-06-19 RX ADMIN — MAGNESIUM OXIDE TAB 400 MG (241.3 MG ELEMENTAL MG) 400 MG: 400 (241.3 MG) TAB at 05:31

## 2019-06-19 RX ADMIN — SENNOSIDES AND DOCUSATE SODIUM 2 TABLET: 8.6; 5 TABLET ORAL at 17:50

## 2019-06-19 RX ADMIN — OMEPRAZOLE 20 MG: 20 CAPSULE, DELAYED RELEASE ORAL at 05:29

## 2019-06-19 RX ADMIN — METOPROLOL TARTRATE 25 MG: 25 TABLET, FILM COATED ORAL at 17:50

## 2019-06-19 RX ADMIN — CEFEPIME 2 G: 2 INJECTION, POWDER, FOR SOLUTION INTRAVENOUS at 17:50

## 2019-06-19 RX ADMIN — CEFEPIME 2 G: 2 INJECTION, POWDER, FOR SOLUTION INTRAVENOUS at 05:32

## 2019-06-19 RX ADMIN — Medication 1 CAPSULE: at 07:41

## 2019-06-19 RX ADMIN — SUCRALFATE 1 G: 1 SUSPENSION ORAL at 12:34

## 2019-06-19 RX ADMIN — LIDOCAINE 2 PATCH: 50 PATCH CUTANEOUS at 14:07

## 2019-06-19 RX ADMIN — CYCLOBENZAPRINE 10 MG: 10 TABLET, FILM COATED ORAL at 12:34

## 2019-06-19 RX ADMIN — SUCRALFATE 1 G: 1 SUSPENSION ORAL at 17:50

## 2019-06-19 RX ADMIN — ACETAMINOPHEN 650 MG: 325 TABLET, FILM COATED ORAL at 07:41

## 2019-06-19 RX ADMIN — METOPROLOL TARTRATE 25 MG: 25 TABLET, FILM COATED ORAL at 05:31

## 2019-06-19 RX ADMIN — VANCOMYCIN HYDROCHLORIDE 1200 MG: 100 INJECTION, POWDER, LYOPHILIZED, FOR SOLUTION INTRAVENOUS at 12:34

## 2019-06-19 RX ADMIN — CINACALCET HYDROCHLORIDE 60 MG: 30 TABLET, COATED ORAL at 05:32

## 2019-06-19 RX ADMIN — OXYCODONE HYDROCHLORIDE 5 MG: 5 TABLET ORAL at 05:29

## 2019-06-19 RX ADMIN — SUCRALFATE 1 G: 1 SUSPENSION ORAL at 05:32

## 2019-06-19 RX ADMIN — LOSARTAN POTASSIUM 50 MG: 50 TABLET ORAL at 05:31

## 2019-06-19 RX ADMIN — ATORVASTATIN CALCIUM 10 MG: 10 TABLET, FILM COATED ORAL at 17:50

## 2019-06-19 RX ADMIN — OXYCODONE HYDROCHLORIDE 10 MG: 5 TABLET ORAL at 12:34

## 2019-06-19 RX ADMIN — ACETAMINOPHEN 1000 MG: 500 TABLET ORAL at 17:50

## 2019-06-19 RX ADMIN — AMLODIPINE BESYLATE 10 MG: 5 TABLET ORAL at 05:30

## 2019-06-19 RX ADMIN — RIVAROXABAN 20 MG: 20 TABLET, FILM COATED ORAL at 17:50

## 2019-06-19 RX ADMIN — POTASSIUM CHLORIDE 40 MEQ: 20 TABLET, EXTENDED RELEASE ORAL at 17:50

## 2019-06-19 RX ADMIN — CYCLOBENZAPRINE 10 MG: 10 TABLET, FILM COATED ORAL at 02:26

## 2019-06-19 ASSESSMENT — ENCOUNTER SYMPTOMS
HEADACHES: 0
SHORTNESS OF BREATH: 0
ABDOMINAL PAIN: 0
CHILLS: 0
DIARRHEA: 0
VOMITING: 0
FEVER: 0
NAUSEA: 0
COUGH: 0
DIZZINESS: 0

## 2019-06-19 NOTE — PROGRESS NOTES
IR CT RN note:  Pt consented by Dr. Cadena prior to procedure, all questions answered. Pt, RN, MD signed consent, consent placed in chart.  Site Marked and Confirmed with MD, patient and RN pre procedure   Right hip deep bone biopsy performed by MD Cadena assisted by CT Tech Willie,  Right hip access site;  Right hip dressed with gauze and tegaderm.  2 cores in saline sent to lab    End Tidal CO2 range 29-32 during procedure   Patient tolerated procedure, hemodynamically stable; pt awake and talking post procedure; report given to PATRICK Cordero;   patient transported back to [facility] via IR RN monitored then transferred care to report RN

## 2019-06-19 NOTE — CARE PLAN
Problem: Safety  Goal: Will remain free from falls    Intervention: Assess risk factors for falls  Fall measures in place. Bed alarm on, call light within reach, personal belongings close-by, bed in lowest position, IV pole on same side of bathroom, upper bed rails up, hourly rounding in place. Will continue to monitor pt for safety.       Problem: Pain Management  Goal: Pain level will decrease to patient's comfort goal    Intervention: Follow pain managment plan developed in collaboration with patient and Interdisciplinary Team  Pt medicated for pain per mar, hourly rounding in place.

## 2019-06-19 NOTE — THERAPY
Attempted to see pt for PT tx. Pt in pain and will be going for biopsy today. Will reattempt as able.    Ashley, PTA  [phone]

## 2019-06-19 NOTE — PROGRESS NOTES
Infectious Disease Progress Note    Author: Bakari Kovacs D.O. Date & Time of service: 2019  9:03 AM    Chief Complaint:  Brain abscess, iliopsoas abscess, leukopenia      Interval History:  68 y.o. female admitted 2019 as a transfer from [facility] since 2019. + diabetes, SANTOSH of right hip with hardware, and breast cancer who was originally admitted to [facility] on 2019 for right hip and pelvic pain.  Work-up revealed a right iliopsoas lesion, gluteal abscess, and mult brain abscesses  2019-no fevers.  Continues to complain of significant pain in her hips.  Pain medications are being adjusted.  2019-no fevers.  Continues to remain off the antibiotics.  Awaiting Ortho evaluation  2019 patient febrile up to 103.  Having shaking chills.  Is not feeling well.  Denies any specific complaints.  6/10 AF WBC 5.3 somnolent No fever or new complaints   AF WBC 9 No fever or chills-some pain at surgical site. Limited participation with PT noted   AF WBC 7.3 somnolent but arousable-no new complaints   afebrile WBC 9.1 patient complaining of left hip pain.  Denies any headaches   afebrile, no CBC today.  Reports no new issues, tolerating antibiotics.  States the left hip pain is unchanged.   afebrile, WBC 6.9, HR 80s, -150s, on room air. Repeat CT on  showed no change in right iliac fluid collection, changes in right iliac/bilateral sacrum and left iliac suspicious of OM.     Labs Reviewed, Medications Reviewed, and Wound Reviewed.    Review of Systems:  Review of Systems   Constitutional: Negative for chills and fever.   Respiratory: Negative for cough and shortness of breath.    Gastrointestinal: Negative for abdominal pain, diarrhea, nausea and vomiting.   Musculoskeletal: Positive for joint pain.        Left hip   Neurological: Negative for dizziness and headaches.   All other systems reviewed and are negative.  Unchanged    Hemodynamics:  Temp (24hrs), Av.8 °C  (98.3 °F), Min:36.3 °C (97.3 °F), Max:37.3 °C (99.1 °F)  Temperature: 37.3 °C (99.1 °F)  Pulse  Av.4  Min: 60  Max: 125  Blood Pressure : 140/76       Physical Exam:  Physical Exam   Constitutional: No distress.   HENT:   Mouth/Throat: Oropharynx is clear and moist. No oropharyngeal exudate.   Eyes: Conjunctivae are normal. No scleral icterus.   Neck: Neck supple.   Cardiovascular: Normal rate, regular rhythm and normal heart sounds.    No murmur heard.  Pulmonary/Chest: Effort normal and breath sounds normal. She has no wheezes. She has no rales.   Abdominal: Soft. She exhibits no distension. There is tenderness. There is rebound.   Musculoskeletal: She exhibits tenderness. She exhibits no edema.   Right hip surgical incision was noted. No surrounding erythema   Neurological: She is alert.   No gross focal neuro deficit   Skin: Skin is warm. No rash noted. She is not diaphoretic. No erythema.   Psychiatric: She has a normal mood and affect.   Very pleasant   Nursing note and vitals reviewed.  No change compared to     Meds:    Current Facility-Administered Medications:   •  magnesium sulfate  •  morphine injection  •  lactobacillus rhamnosus  •  potassium chloride SA  •  oxyCODONE immediate-release  •  sucralfate  •  vancomycin  •  Pharmacy Consult Request  •  magnesium oxide  •  fluconazole  •  MD Alert...Vancomycin per Pharmacy  •  cefepime  •  acetaminophen  •  LORazepam  •  [DISCONTINUED] insulin regular **AND** [CANCELED] Accu-Chek ACHS **AND** NOTIFY MD and PharmD **AND** glucose **AND** dextrose 10% bolus  •  NS  •  pramipexole  •  lidocaine  •  temazepam  •  cyclobenzaprine  •  Respiratory Care per Protocol  •  amLODIPine  •  cinacalcet  •  losartan  •  acetaminophen  •  atorvastatin  •  omeprazole  •  metoprolol  •  senna-docusate **AND** polyethylene glycol/lytes **AND** magnesium hydroxide **AND** bisacodyl  •  ondansetron  •  ondansetron    Labs:  Recent Labs      19   1206  19    "0334 06/19/19   0310   WBC   --   6.9  5.9   RBC   --   3.92*  3.88*   HEMOGLOBIN  11.1*  10.9*  11.2*   HEMATOCRIT  34.3*  35.7*  34.1*   MCV   --   91.1  87.9   MCH   --   27.8  28.9   RDW   --   61.0*  58.8*   PLATELETCT   --   302  290   MPV   --   9.1  9.6   NEUTSPOLYS   --   67.40  64.90   LYMPHOCYTES   --   16.20*  18.00*   MONOCYTES   --   11.40  10.80   EOSINOPHILS   --   3.90  5.30   BASOPHILS   --   0.70  0.70     Recent Labs      06/17/19   0157 06/18/19   0334 06/19/19   0310   SODIUM  135  132*  132*   POTASSIUM  2.9*  3.9  3.7   CHLORIDE  100  101  100   CO2  25  24  23   GLUCOSE  125*  102*  123*   BUN  11  11  10     Recent Labs      06/17/19 0157 06/18/19   0334 06/19/19   0310   CREATININE  0.50  0.42*  0.31*       Imaging:  MRI of the brain on 6/8/2019  Impression       1.  Interval progression of supra and infratentorial intra-axial nodules and associated edema. This short-term interval progression favors infection over neoplasm though both remain considerations.  2.  Mild atrophy         Micro:  Results     Procedure Component Value Units Date/Time    FUNGAL BLOOD CULTURE [[id number]]     Order Status:  No result Specimen:  Blood from Blood     FLUID CULTURE W/GRAM STAIN [[id number]]     Order Status:  No result Specimen:  Body Fluid from Other Body Fluid     Fungal Culture [[id number]]     Order Status:  No result Specimen:  Other from Other Tissue     Fungal Smear [[id number]]     Order Status:  No result Specimen:  Body Fluid from Other Tissue     AFB Culture [[id number]]     Order Status:  No result Specimen:  Other from Hip           Assessment:  69 yo female with:  Gluteal and iliopsoas abscess  Brain abscesses vs mets  Breast cancer  DM2    Plan:  Brain abscesses vs mets.  Persistent with interval progression on last MRI  MRI 4/23 \"supra and infratentorial brain parenchyma. Decrease in the size of the lesions. Interval reduction in the extent of the surrounding white matter edema " "consistent with improvement/treatment response of the most of the multifocal brain abscesses.  MRI 5/21 showed innumerable microabscesses vs mets  Abx (vancomycin, cefepime and Flagyl) discontinued on 5/23 after ~8 weeks of treatment-developed new fevers on 6/4  MRI on 6/8 with interval progression of supra and infratentorial intra--axial nodules and associated edema.  New right thalamus lesion. Radiology favors infection over neoplasm though both remain considerations (had been off abx)  Neurosurgery evaluation - recommended continuing antibiotics for another 2 weeks and then repeating MRI brain, as the lesions are not amenable to biopsy   Mult blood cultures neg  CHAS neg 3/15 and 5/24  +QuantiGold as above  Continue vanco/cefepime  Fluconazole 800 mg daily was added on 6/9 and is being continued at this time  Monitor renal function and Vanco trough  Vanco trough 18.6 6/15  Plan for repeat MRI around 6/21/2019 to assess for improvement  Will need to reassess need for biopsy if persistent or worse, and sent for pathology, regular, AFB, fungal cultures     Latent TB  As CNS lesions previously improved on nontuberculosis treatment, continuing as above  Repeat MRI in 6/8 with interval progression   If progression noted on repeat MRI brain or pelvis CT, will need biopsy with pathology, AFB, fungal, bacterial cultures    Fever of unknown origin.  Resolved  Fever and chills 6/4  Cultures negative  Now afebrile  QuantiGold+. CXR neg    Gluteal and iliopsoas abscess s/p treatment.   Adjacent to hardware in right hip (placed 12/17/18)  CT 4/23 \"Fluid collections within the right gluteal and iliacis muscles.. The collection within the right gluteal muscle has unchanged while the fluid collection within the right iliacis muscle is increased somewhat in size.\" 2.7 cm  Cultures 3/29 and 4/4 neg  MRI on 5/20/2019 - interval decrease in collection in R gluteal muscle and persistent multiloculated collection in R iliacus muscle. "   CT 5/28 no change  s/p drainage on 5/30/2019-cultures negative  S/p removal hardware 6/5-no cultures done  Panculture neg  1, 3 beta glucan neg  S/p removal hardware 6/5/19  CT on 6/8 with residual abscess in the right iliacus muscle, 2.5 x 1.8 cm, slightly smaller than prior  Repeat CT 6/17 showed persistent fluid collections. I reviewed CT with radiology, Dr. Cadena and the right iliac fluid collection is amenable for aspiration, however it's high risk for colon injury. For this reason, we elected not to have aspiration of the fluid collections, but rather the bone biopsy of the iliac bone.     Pt did have CT-guided deep bone biopsy today on 6/19. Bone sample was sent for gram stain/routine culture, fungal smear/cx, and AFB smear/cx. I confirmed this with microbiology.     Type 2 DM  Hemoglobin A1c 6.3   Keep -140s    Discussed with Dr. Barkley, Hospital Medicine    ID will follow.  Please call with questions.

## 2019-06-19 NOTE — CARE PLAN
Problem: Mobility  Goal: Risk for activity intolerance will decrease    Intervention: Assess and monitor signs of activity intolerance  Encouraging mobility and getting OOB to chair. Pt in a lot of pain and screaming with assistance to bedside commode. PRN medication on board.       Problem: Skin Integrity  Goal: Risk for impaired skin integrity will decrease    Intervention: Implement precautions to protect skin integrity in collaboration with the interdisciplinary team  Pt has a 2 RN skin assessment. Waffle cushion in place and using extra pillows.

## 2019-06-19 NOTE — PROGRESS NOTES
"Pharmacy Kinetics 68 y.o. female on vancomycin day # 16  2019    Currently Dose: Vancomycin 1200 mg iv q24hr (~21 mg/kg)  Received Load Dose: Yes     Indication for Treatment: brain abscess + iliopsoas abscess  ID Service Following: Yes     Pertinent history per medical record: Admitted on 2019 for pelvic abscess (LTACH transfer) after recent admission at [facility] ([place]). Concerns for brain and iliopsoas abscess. NSG and ID consulted (I&D per IR 19; hardware removal 19).      Other antibiotics: cefepime 2 gm iv q12h, fluconazole 800 mg po qd     Allergies: Patient has no known allergies.      List concerns for accumulation of vancomycin: age 68, low body weight, abnormal LFT's, BUN:SCr ~20:1     Pertinent cultures to date:   19: C.Diff: Negative   19: PBCx2: NGTD   19: Pelvic abscess,BF: NGTD      MRSA nares swab if pneumonia is a concern (ordered/positive/negative/n-a): n/a    Recent Labs      19   0334  19   0310   WBC  6.9  5.9   NEUTSPOLYS  67.40  64.90     Recent Labs      19   0157  19   0334  19   0310   BUN  11  11  10   CREATININE  0.50  0.42*  0.31*     Recent Labs      19   1206   VANCOTROUGH  16.9     Intake/Output Summary (Last 24 hours) at 19 0714  Last data filed at 19 1300   Gross per 24 hour   Intake              720 ml   Output                0 ml   Net              720 ml      /63   Pulse 84   Temp 36.9 °C (98.5 °F) (Temporal)   Resp 17   Ht 1.575 m (5' 2\")   Wt 59.2 kg (130 lb 8.2 oz)   SpO2 96%  Temp (24hrs), Av.6 °C (97.9 °F), Min:36.3 °C (97.3 °F), Max:36.9 °C (98.5 °F)    Estimated Creatinine Clearance: 133.6 mL/min (A) (by C-G formula based on SCr of 0.31 mg/dL (L)).    A/P   1. Vancomycin dose change: not indicated   2. Next vancomycin level: 19 @1230 (ordered)   3. Goal trough: 18-22 mcg/mL  4. Comments: VS stable. Afebrile. WBC stable. CrCl ~134 mL/min (SCr improved). No new " microbiology. Concerns for accumulation of vancomycin noted. Vancomycin level in place prior to PM dose 6/19/19 to assess clearance. BMP with AM labs. Pharmacy will continue to follow.    Kulwant Adhikari PharmD     Pharmacy Addendum:   6/19/2019 12:28   Vancomycin Trough 20.1 (H)     Updated vancomycin level noted, at goal, and drawn appropriately. Will proceed with dose regimen as ordered. Likely to repeat vancomycin trough in ~2-3 days. BMP with AM labs. Pharmacy will continue to follow.    Kulwant Adhikari PharmD

## 2019-06-19 NOTE — OR SURGEON
Immediate Post- Operative Note        PostOp Diagnosis: Suspected osteomyelitis of the pelvis.       Procedure(s): CT guided deep bone biopsy.       Estimated Blood Loss: Less than 5 ml        Complications: None            6/19/2019     0936 AM     Willy Cadena

## 2019-06-19 NOTE — PROGRESS NOTES
Pt back from IR. Telephone report received.   VS taken. Pt stable no SOB or in any acute distress.   VSS and recorded.

## 2019-06-19 NOTE — PROGRESS NOTES
Hospital Medicine Daily Progress Note    Date of Service  6/19/2019    Chief Complaint  persistent abscess.    Hospital Course     Ms. Martini is a 68-year-old female who was transferred from [facility] on 5/29/2019 with persistent right gluteal abscesses since sacral fracture repair in December.  She has had multiple IR drainage.  She initially presented with pelvic pain, low back pain, and confusion. She also had new slurred speech. Imaging of the abdomen, pelvis and brain revealed abscesses and she was treated with a full course of IV antibiotics per ID.  Her slurred speech improved and she was able to ambulate with physical therapy. However, repeat imaging showed persistent pelvic abscesses.  MRI brain shows increase in brain lesions.  Her brain lesions are known from prior imaging and there is concern they represent metastasis.  Oncology aware and do not recommend further imaging. Cultures remained negative and a CHAS done by Dr. Potter showed no vegetations. The size of the pelvic abscesses was reviewed by interventional radiology and the case was discussed with Dr. Finley who recommended transfer to [facility] for CT guided drainage. She underwent drainage on 5/30. On 6/4, she became febrile again, so restarted on cefepime and vancomycin per ID and has been on them since.  Right hip hardware was removed on 6/5. Repeat MRI brain on 6/7/19 saw progression of the supra and infratentorial intra-axial nodules with edema, prompting a Neurosurgery consult.  Dr. Nguyen stated lesions are not amendable to biopsy stereotactically, and favors infectious etiology, recommended continued antibiotics, antineuroleptic and repeat brain MRI in 2 weeks (around 6/21).  Repeat CT pelvis on 6/9 showed residual fluid collection 2.5 x 1.8 cm in right iliacus muscle., slightly smaller than before.  Cefepime + vanc were continue. Work up was broadened to include fungal etiology, and she is started on empiric started fluconazole. Repeat CT on 6/17  showed no significant interval change in the size of the right iliacus muscle fluid collections, with changes in the right iliac bone, bilateral sacrum, and left iliac bone suspicious for osteomyelitis, and changes at L5-S1 suspicious for discitis/osteomyelitis, along with hyperdense right gluteal lesion, moderately suspicious for postoperative pseudoaneurysm with adjacent hematoma. IR felt aspiration of the fluid will have low yield, and so recommended biopsy of the nonhealing right iliac fracture area instead.      Interval Problem Update  6/19/2019 - I reviewed the patient's chart today. Uneventful night. VSS. Afebrile. Saturating well on RA.  No leukocytosis.  Hemoglobin is stable.  Sodium stable at 132.  Magnesium remains low at 1.4.    > I have personally seen and examined the patient today.  She had a rough night, with severe pain on the hips all night long, worse with movement.  She denies any other complaints.  No chest pain or shortness of breath.  No nausea or vomiting.        Consultants/Specialty  Infectious disease  Orthopedic surgery  Neurosurgery, Dr. Nguyen  IR    Code Status  DNR/DNI    Disposition  Monitor on the floors.   Anticipate return to LT[facility] once medically cleared.    Review of Systems  ROS     Pertinent positives/negatives as mentioned above.     A complete review of systems was personally done by me. All other systems were negative.       Physical Exam  Temp:  [36.3 °C (97.3 °F)-36.9 °C (98.5 °F)] 36.9 °C (98.5 °F)  Pulse:  [74-84] 84  Resp:  [17-18] 17  BP: (123-146)/(63-89) 123/63  SpO2:  [92 %-96 %] 96 %    Physical Exam   Constitutional: She is oriented to person, place, and time. She appears well-developed and well-nourished. No distress.   HENT:   Head: Normocephalic and atraumatic.   Mouth/Throat: Oropharynx is clear and moist. No oropharyngeal exudate.   Eyes: Pupils are equal, round, and reactive to light. Conjunctivae are normal. No scleral icterus.   Neck: Normal range of  motion. Neck supple.   Cardiovascular: Normal rate and regular rhythm.  Exam reveals no gallop and no friction rub.    No murmur heard.  Pulmonary/Chest: Effort normal and breath sounds normal. No respiratory distress. She has no wheezes. She has no rales. She exhibits no tenderness.   Abdominal: Soft. Bowel sounds are normal. She exhibits no distension. There is no tenderness. There is no rebound and no guarding.   Musculoskeletal: She exhibits tenderness. She exhibits no edema.   Limited range of motion of the left hip more than the right.  Moderate tenderness.   Lymphadenopathy:     She has no cervical adenopathy.   Neurological: She is alert and oriented to person, place, and time. No cranial nerve deficit.   Skin: Skin is warm and dry. No rash noted. She is not diaphoretic. No erythema. No pallor.   Psychiatric: She has a normal mood and affect. Her behavior is normal. Judgment and thought content normal.   Nursing note and vitals reviewed.      Fluids    Intake/Output Summary (Last 24 hours) at 06/19/19 0721  Last data filed at 06/18/19 1300   Gross per 24 hour   Intake              720 ml   Output                0 ml   Net              720 ml       Laboratory  Recent Labs      06/17/19   1206  06/18/19   0334  06/19/19   0310   WBC   --   6.9  5.9   RBC   --   3.92*  3.88*   HEMOGLOBIN  11.1*  10.9*  11.2*   HEMATOCRIT  34.3*  35.7*  34.1*   MCV   --   91.1  87.9   MCH   --   27.8  28.9   MCHC   --   30.5*  32.8*   RDW   --   61.0*  58.8*   PLATELETCT   --   302  290   MPV   --   9.1  9.6     Recent Labs      06/17/19   0157  06/18/19   0334  06/19/19   0310   SODIUM  135  132*  132*   POTASSIUM  2.9*  3.9  3.7   CHLORIDE  100  101  100   CO2  25  24  23   GLUCOSE  125*  102*  123*   BUN  11  11  10   CREATININE  0.50  0.42*  0.31*   CALCIUM  7.8*  8.1*  8.0*     Recent Labs      06/18/19   1603   INR  1.17*               Imaging    Assessment/Plan  * Abscess of right iliac muscle and right gluteus medius  muscle- (present on admission)   Assessment & Plan    - Recurrent issue since sacral fx repair in December 2018. Has had multiple IR drainage and antibiotics. Recent cultures remain unrevealing.    - She will have biopsy of the nonhealing right iliac fracture area today. Send for culture, fungal smear and culture, and AFB culture.   - continue current antibiotics with cefepime, Diflucan, and vancomycin.  ID following.     Sepsis (HCC)   Assessment & Plan    -This is sepsis (without associated acute organ dysfunction).  Likely from her abscesses.  Blood cultures negative.  -Sepsis has resolved.  -Continue antibiotics with cefepime, Vancomycin, Diflucan as per ID.  Further work-up as above.  -Continue close hemodynamic monitoring.  Trend WBC count.  Watch for fevers.     Pseudoaneurysm following procedure (HCC)- (present on admission)   Assessment & Plan    -Likely postoperative.  No further interventions needed.  Monitor.     Hypomagnesemia- (present on admission)   Assessment & Plan    -Remains low. Will replace with another 2 g IV magnesium.  Repeat magnesium level in the morning.     Hypercalcemia- (present on admission)   Assessment & Plan    -Resolved.     Brain lesion- (present on admission)   Assessment & Plan    - will need repeat MRI on 6/21/2018.  Continue IV antibiotics per ID.     Acquired circulating anticoagulants (HCC)- (present on admission)   Assessment & Plan    - holding DOACs for IR biopsy, resume post biopsy.     Non-severe protein-calorie malnutrition (HCC)- (present on admission)   Assessment & Plan    -Moderate.  -Continue nutrition support per RD.       History of DVT (deep vein thrombosis)- (present on admission)   Assessment & Plan    -Resume xarelto tonight post biopsy.      Essential hypertension- (present on admission)   Assessment & Plan    -Maintaining good control.  Continue Lopressor, Cozaar, and Norvasc.  Monitor blood pressure trend closely.     Chronic hyponatremia- (present on  admission)   Assessment & Plan    - remains stable. Continue daily monitoring.      RLS (restless legs syndrome)- (present on admission)   Assessment & Plan    - Continue on pramipexole.      Chronic pain- (present on admission)   Assessment & Plan    - well controlled. Primarily located at left hip.  -Continue as needed oxycodone, Flexeril, and Tylenol.     History of breast cancer- (present on admission)   Assessment & Plan    -S/p left mastectomy and breast implant.  -Needs follow-up as outpatient.     COPD (chronic obstructive pulmonary disease) (HCC)- (present on admission)   Assessment & Plan    - Not on any exacerbation at this time, continue RT protocol.          VTE prophylaxis: xarelto

## 2019-06-19 NOTE — PROGRESS NOTES
· 2 RN skin check completed w/ PATRICK Almaraz   · Devices in place: glasses, piv,   · Skin assessed under devices: intact.  · Confirmed pressure ulcers found on: n/a .  · New potential pressure ulcers noted on : n/a  · The following interventions are in place: ensuring pt remains free of moisture, incontinent pads under pt, encouraging pt to turn, waffle mattress in use.

## 2019-06-19 NOTE — PROGRESS NOTES
Assumed care of pt at 0745. Report received and bedside rounding completed with night RN. Pt moaning and groaning wanting to void. RN attempted to get pt OOB to bedside commode, pt was unable to tolerate due to pain. Pt helped back to bed to use bed pan.    Pt is considered a high fall risk. edu provided on risk level.   Fall precautions in place,  bed alarm. - Treaded non slip socks. Bed locked. Communication board updated with POC. Call light and pt belongings within reach - pt makes needs known - hourly rounding in place. See flowsheets for further assessment.

## 2019-06-20 LAB
ANION GAP SERPL CALC-SCNC: 11 MMOL/L (ref 0–11.9)
BASOPHILS # BLD AUTO: 0.8 % (ref 0–1.8)
BASOPHILS # BLD: 0.06 K/UL (ref 0–0.12)
BUN SERPL-MCNC: 10 MG/DL (ref 8–22)
CALCIUM SERPL-MCNC: 8.2 MG/DL (ref 8.5–10.5)
CHLORIDE SERPL-SCNC: 98 MMOL/L (ref 96–112)
CO2 SERPL-SCNC: 22 MMOL/L (ref 20–33)
CREAT SERPL-MCNC: 0.39 MG/DL (ref 0.5–1.4)
EOSINOPHIL # BLD AUTO: 0.27 K/UL (ref 0–0.51)
EOSINOPHIL NFR BLD: 3.7 % (ref 0–6.9)
ERYTHROCYTE [DISTWIDTH] IN BLOOD BY AUTOMATED COUNT: 59.9 FL (ref 35.9–50)
GLUCOSE SERPL-MCNC: 79 MG/DL (ref 65–99)
HCT VFR BLD AUTO: 37.2 % (ref 37–47)
HGB BLD-MCNC: 11.6 G/DL (ref 12–16)
IMM GRANULOCYTES # BLD AUTO: 0.04 K/UL (ref 0–0.11)
IMM GRANULOCYTES NFR BLD AUTO: 0.5 % (ref 0–0.9)
LYMPHOCYTES # BLD AUTO: 1.32 K/UL (ref 1–4.8)
LYMPHOCYTES NFR BLD: 18 % (ref 22–41)
MAGNESIUM SERPL-MCNC: 1.4 MG/DL (ref 1.5–2.5)
MCH RBC QN AUTO: 27.8 PG (ref 27–33)
MCHC RBC AUTO-ENTMCNC: 31.2 G/DL (ref 33.6–35)
MCV RBC AUTO: 89 FL (ref 81.4–97.8)
MONOCYTES # BLD AUTO: 0.74 K/UL (ref 0–0.85)
MONOCYTES NFR BLD AUTO: 10.1 % (ref 0–13.4)
NEUTROPHILS # BLD AUTO: 4.92 K/UL (ref 2–7.15)
NEUTROPHILS NFR BLD: 66.9 % (ref 44–72)
NRBC # BLD AUTO: 0 K/UL
NRBC BLD-RTO: 0 /100 WBC
PLATELET # BLD AUTO: 296 K/UL (ref 164–446)
PMV BLD AUTO: 9.4 FL (ref 9–12.9)
POTASSIUM SERPL-SCNC: 4 MMOL/L (ref 3.6–5.5)
RBC # BLD AUTO: 4.18 M/UL (ref 4.2–5.4)
SODIUM SERPL-SCNC: 131 MMOL/L (ref 135–145)
WBC # BLD AUTO: 7.4 K/UL (ref 4.8–10.8)

## 2019-06-20 PROCEDURE — 36415 COLL VENOUS BLD VENIPUNCTURE: CPT

## 2019-06-20 PROCEDURE — 700102 HCHG RX REV CODE 250 W/ 637 OVERRIDE(OP): Performed by: INTERNAL MEDICINE

## 2019-06-20 PROCEDURE — 700102 HCHG RX REV CODE 250 W/ 637 OVERRIDE(OP): Performed by: HOSPITALIST

## 2019-06-20 PROCEDURE — 700105 HCHG RX REV CODE 258: Performed by: HOSPITALIST

## 2019-06-20 PROCEDURE — A9270 NON-COVERED ITEM OR SERVICE: HCPCS | Performed by: INTERNAL MEDICINE

## 2019-06-20 PROCEDURE — 97110 THERAPEUTIC EXERCISES: CPT

## 2019-06-20 PROCEDURE — 700111 HCHG RX REV CODE 636 W/ 250 OVERRIDE (IP): Performed by: INTERNAL MEDICINE

## 2019-06-20 PROCEDURE — 97530 THERAPEUTIC ACTIVITIES: CPT

## 2019-06-20 PROCEDURE — A9270 NON-COVERED ITEM OR SERVICE: HCPCS | Performed by: NURSE PRACTITIONER

## 2019-06-20 PROCEDURE — 700102 HCHG RX REV CODE 250 W/ 637 OVERRIDE(OP): Performed by: NURSE PRACTITIONER

## 2019-06-20 PROCEDURE — 700101 HCHG RX REV CODE 250: Performed by: NURSE PRACTITIONER

## 2019-06-20 PROCEDURE — 80048 BASIC METABOLIC PNL TOTAL CA: CPT

## 2019-06-20 PROCEDURE — A9270 NON-COVERED ITEM OR SERVICE: HCPCS | Performed by: HOSPITALIST

## 2019-06-20 PROCEDURE — 700111 HCHG RX REV CODE 636 W/ 250 OVERRIDE (IP): Performed by: HOSPITALIST

## 2019-06-20 PROCEDURE — 99233 SBSQ HOSP IP/OBS HIGH 50: CPT | Performed by: INTERNAL MEDICINE

## 2019-06-20 PROCEDURE — 83735 ASSAY OF MAGNESIUM: CPT

## 2019-06-20 PROCEDURE — 85025 COMPLETE CBC W/AUTO DIFF WBC: CPT

## 2019-06-20 PROCEDURE — 99232 SBSQ HOSP IP/OBS MODERATE 35: CPT | Performed by: INTERNAL MEDICINE

## 2019-06-20 PROCEDURE — 770006 HCHG ROOM/CARE - MED/SURG/GYN SEMI*

## 2019-06-20 PROCEDURE — 700105 HCHG RX REV CODE 258: Performed by: INTERNAL MEDICINE

## 2019-06-20 RX ORDER — MAGNESIUM SULFATE HEPTAHYDRATE 40 MG/ML
4 INJECTION, SOLUTION INTRAVENOUS ONCE
Status: COMPLETED | OUTPATIENT
Start: 2019-06-20 | End: 2019-06-20

## 2019-06-20 RX ADMIN — METOPROLOL TARTRATE 25 MG: 25 TABLET, FILM COATED ORAL at 17:49

## 2019-06-20 RX ADMIN — PRAMIPEXOLE DIHYDROCHLORIDE 1 MG: 0.5 TABLET ORAL at 17:49

## 2019-06-20 RX ADMIN — RIVAROXABAN 20 MG: 20 TABLET, FILM COATED ORAL at 17:49

## 2019-06-20 RX ADMIN — CINACALCET HYDROCHLORIDE 60 MG: 30 TABLET, COATED ORAL at 04:54

## 2019-06-20 RX ADMIN — SUCRALFATE 1 G: 1 SUSPENSION ORAL at 17:50

## 2019-06-20 RX ADMIN — FLUCONAZOLE 800 MG: 200 TABLET ORAL at 04:53

## 2019-06-20 RX ADMIN — MAGNESIUM OXIDE TAB 400 MG (241.3 MG ELEMENTAL MG) 400 MG: 400 (241.3 MG) TAB at 04:53

## 2019-06-20 RX ADMIN — OXYCODONE HYDROCHLORIDE 5 MG: 5 TABLET ORAL at 21:10

## 2019-06-20 RX ADMIN — ACETAMINOPHEN 1000 MG: 500 TABLET ORAL at 04:54

## 2019-06-20 RX ADMIN — SUCRALFATE 1 G: 1 SUSPENSION ORAL at 11:36

## 2019-06-20 RX ADMIN — LIDOCAINE 2 PATCH: 50 PATCH CUTANEOUS at 11:45

## 2019-06-20 RX ADMIN — ACETAMINOPHEN 1000 MG: 500 TABLET ORAL at 17:48

## 2019-06-20 RX ADMIN — OMEPRAZOLE 20 MG: 20 CAPSULE, DELAYED RELEASE ORAL at 04:54

## 2019-06-20 RX ADMIN — POTASSIUM CHLORIDE 40 MEQ: 20 TABLET, EXTENDED RELEASE ORAL at 17:49

## 2019-06-20 RX ADMIN — SENNOSIDES AND DOCUSATE SODIUM 2 TABLET: 8.6; 5 TABLET ORAL at 17:49

## 2019-06-20 RX ADMIN — Medication 1 CAPSULE: at 09:12

## 2019-06-20 RX ADMIN — CEFEPIME 2 G: 2 INJECTION, POWDER, FOR SOLUTION INTRAVENOUS at 19:01

## 2019-06-20 RX ADMIN — OXYCODONE HYDROCHLORIDE 5 MG: 5 TABLET ORAL at 04:55

## 2019-06-20 RX ADMIN — PRAMIPEXOLE DIHYDROCHLORIDE 1 MG: 0.5 TABLET ORAL at 11:36

## 2019-06-20 RX ADMIN — VANCOMYCIN HYDROCHLORIDE 1200 MG: 100 INJECTION, POWDER, LYOPHILIZED, FOR SOLUTION INTRAVENOUS at 16:49

## 2019-06-20 RX ADMIN — OXYCODONE HYDROCHLORIDE 10 MG: 5 TABLET ORAL at 00:11

## 2019-06-20 RX ADMIN — AMLODIPINE BESYLATE 10 MG: 5 TABLET ORAL at 04:55

## 2019-06-20 RX ADMIN — SUCRALFATE 1 G: 1 SUSPENSION ORAL at 04:53

## 2019-06-20 RX ADMIN — METOPROLOL TARTRATE 25 MG: 25 TABLET, FILM COATED ORAL at 04:55

## 2019-06-20 RX ADMIN — LOSARTAN POTASSIUM 50 MG: 50 TABLET ORAL at 04:54

## 2019-06-20 RX ADMIN — MAGNESIUM SULFATE IN WATER 4 G: 40 INJECTION, SOLUTION INTRAVENOUS at 09:04

## 2019-06-20 RX ADMIN — PRAMIPEXOLE DIHYDROCHLORIDE 1 MG: 0.5 TABLET ORAL at 04:53

## 2019-06-20 RX ADMIN — CEFEPIME 2 G: 2 INJECTION, POWDER, FOR SOLUTION INTRAVENOUS at 04:52

## 2019-06-20 RX ADMIN — ACETAMINOPHEN 650 MG: 325 TABLET, FILM COATED ORAL at 11:36

## 2019-06-20 RX ADMIN — ATORVASTATIN CALCIUM 10 MG: 10 TABLET, FILM COATED ORAL at 17:50

## 2019-06-20 RX ADMIN — POTASSIUM CHLORIDE 40 MEQ: 20 TABLET, EXTENDED RELEASE ORAL at 04:55

## 2019-06-20 RX ADMIN — SUCRALFATE 1 G: 1 SUSPENSION ORAL at 00:11

## 2019-06-20 ASSESSMENT — COGNITIVE AND FUNCTIONAL STATUS - GENERAL
SUGGESTED CMS G CODE MODIFIER MOBILITY: CL
MOBILITY SCORE: 13
MOVING TO AND FROM BED TO CHAIR: UNABLE
TURNING FROM BACK TO SIDE WHILE IN FLAT BAD: A LITTLE
MOVING FROM LYING ON BACK TO SITTING ON SIDE OF FLAT BED: UNABLE
STANDING UP FROM CHAIR USING ARMS: A LITTLE
CLIMB 3 TO 5 STEPS WITH RAILING: A LOT
WALKING IN HOSPITAL ROOM: A LITTLE

## 2019-06-20 ASSESSMENT — ENCOUNTER SYMPTOMS
SHORTNESS OF BREATH: 0
NAUSEA: 0
HEADACHES: 0
COUGH: 0
FEVER: 0
ABDOMINAL PAIN: 0
DIZZINESS: 0
CHILLS: 0
VOMITING: 0
DIARRHEA: 0

## 2019-06-20 ASSESSMENT — GAIT ASSESSMENTS: GAIT LEVEL OF ASSIST: UNABLE TO PARTICIPATE

## 2019-06-20 NOTE — PROGRESS NOTES
· 2 RN skin check completed with Essence NGUYEN.  · Devices in place: glasses, piv,   · Skin assessed under devices: intact.  · Confirmed pressure ulcers found on: n/a .  · New potential pressure ulcers noted on : n/a  · The following interventions are in place: ensuring pt remains free of moisture, incontinent pads under pt, encouraging pt to turn, waffle mattress in use.

## 2019-06-20 NOTE — PROGRESS NOTES
· 2 RN skin check completed with Carley NGUYEN.  · Devices in place: glasses, piv,   · Skin assessed under devices: intact.  · Confirmed pressure ulcers found on: n/a .  · New potential pressure ulcers noted on : n/a  · The following interventions are in place: ensuring pt remains free of moisture, torrey cream applied,  incontinent pads under pt, encouraging pt to turn, waffle mattress in use.   · Coccyx blanchable redness noted applied torrey cream and kept skin dry and clean from incontinence.

## 2019-06-20 NOTE — CARE PLAN
Problem: Discharge Barriers/Planning  Goal: Patient's continuum of care needs will be met    Intervention: Collaborate with Transitional Care Team and Interdisciplinary Team to meet discharge needs  Possible discharge to [facility] when medically clear.      Problem: Skin Integrity  Goal: Risk for impaired skin integrity will decrease    Intervention: Assess and monitor skin integrity, appearance and/or temperature  Kept skin dry and clean from incontinence and applied barrier cream, encouraged pt to call for bedpan when needed.

## 2019-06-20 NOTE — PROGRESS NOTES
"Received bedside report from Night RN. Pt awake and alert. Bed alarm in use. No complains of pain at this time. Discussed plan of care. Waffle mattress in use due to incontinence. Barrier paste applied. /65   Pulse 71   Temp 36.4 °C (97.6 °F) (Temporal)   Resp 16   Ht 1.575 m (5' 2\")   Wt 59.2 kg (130 lb 8.2 oz)   SpO2 96%   Breastfeeding? No   BMI 23.87 kg/m²     "

## 2019-06-20 NOTE — PROGRESS NOTES
Infectious Disease Progress Note    Author: Beatriz Johnson M.D. Date & Time of service: 6/20/2019  12:40 PM    Chief Complaint:  Brain abscess, iliopsoas abscess, leukopenia      Interval History:  68 y.o. female admitted 5/29/2019 as a transfer from [facility] since 4/30/2019. + diabetes, SANTOSH of right hip with hardware, and breast cancer who was originally admitted to [facility] on 03/08/2019 for right hip and pelvic pain.  Work-up revealed a right iliopsoas lesion, gluteal abscess, and mult brain abscesses  6/1/2019-no fevers.  Continues to complain of significant pain in her hips.  Pain medications are being adjusted.  6/2/2019-no fevers.  Continues to remain off the antibiotics.  Awaiting Ortho evaluation  6/4/2019 patient febrile up to 103.  Having shaking chills.  Is not feeling well.  Denies any specific complaints.  6/10 AF WBC 5.3 somnolent No fever or new complaints  6/11 AF WBC 9 No fever or chills-some pain at surgical site. Limited participation with PT noted  6/12 AF WBC 7.3 somnolent but arousable-no new complaints  6/13 afebrile WBC 9.1 patient complaining of left hip pain.  Denies any headaches  6/14 afebrile, no CBC today.  Reports no new issues, tolerating antibiotics.  States the left hip pain is unchanged.  6/18 afebrile, WBC 6.9, HR 80s, -150s, on room air. Repeat CT on 6/17 showed no change in right iliac fluid collection, changes in right iliac/bilateral sacrum and left iliac suspicious of OM.   6/20- no fevers. C/o hip pain. No fevers.  Labs Reviewed, Medications Reviewed, and Wound Reviewed.    Review of Systems:  Review of Systems   Constitutional: Negative for chills and fever.   Respiratory: Negative for cough and shortness of breath.    Gastrointestinal: Negative for abdominal pain, diarrhea, nausea and vomiting.   Musculoskeletal: Positive for joint pain.        Left hip   Neurological: Negative for dizziness and headaches.   All other systems reviewed and are  negative.  Unchanged    Hemodynamics:  Temp (24hrs), Av.3 °C (97.4 °F), Min:36 °C (96.8 °F), Max:36.7 °C (98.1 °F)  Temperature: 36.4 °C (97.6 °F)  Pulse  Av  Min: 60  Max: 125  Blood Pressure : 121/65       Physical Exam:  Physical Exam   Constitutional: No distress.   HENT:   Mouth/Throat: Oropharynx is clear and moist. No oropharyngeal exudate.   Eyes: Conjunctivae are normal. No scleral icterus.   Neck: Neck supple.   Cardiovascular: Normal rate, regular rhythm and normal heart sounds.    No murmur heard.  Pulmonary/Chest: Effort normal and breath sounds normal. She has no wheezes. She has no rales.   Abdominal: Soft. She exhibits no distension. There is tenderness. There is rebound.   Musculoskeletal: She exhibits tenderness. She exhibits no edema.   Right hip surgical incision was noted. No surrounding erythema   Neurological: She is alert.   No gross focal neuro deficit   Skin: Skin is warm. No rash noted. She is not diaphoretic. No erythema.   Psychiatric: She has a normal mood and affect.   Very pleasant   Nursing note and vitals reviewed.  No change compared to     Meds:    Current Facility-Administered Medications:   •  magnesium sulfate  •  morphine injection  •  rivaroxaban  •  lactobacillus rhamnosus  •  potassium chloride SA  •  oxyCODONE immediate-release  •  sucralfate  •  vancomycin  •  Pharmacy Consult Request  •  magnesium oxide  •  fluconazole  •  MD Alert...Vancomycin per Pharmacy  •  cefepime  •  acetaminophen  •  LORazepam  •  [DISCONTINUED] insulin regular **AND** [CANCELED] Accu-Chek ACHS **AND** NOTIFY MD and PharmD **AND** glucose **AND** dextrose 10% bolus  •  NS  •  pramipexole  •  lidocaine  •  temazepam  •  cyclobenzaprine  •  Respiratory Care per Protocol  •  amLODIPine  •  cinacalcet  •  losartan  •  acetaminophen  •  atorvastatin  •  omeprazole  •  metoprolol  •  senna-docusate **AND** polyethylene glycol/lytes **AND** magnesium hydroxide **AND** bisacodyl  •   ondansetron  •  ondansetron    Labs:  Recent Labs      06/18/19   0334  06/19/19   0310  06/20/19   0310   WBC  6.9  5.9  7.4   RBC  3.92*  3.88*  4.18*   HEMOGLOBIN  10.9*  11.2*  11.6*   HEMATOCRIT  35.7*  34.1*  37.2   MCV  91.1  87.9  89.0   MCH  27.8  28.9  27.8   RDW  61.0*  58.8*  59.9*   PLATELETCT  302  290  296   MPV  9.1  9.6  9.4   NEUTSPOLYS  67.40  64.90  66.90   LYMPHOCYTES  16.20*  18.00*  18.00*   MONOCYTES  11.40  10.80  10.10   EOSINOPHILS  3.90  5.30  3.70   BASOPHILS  0.70  0.70  0.80     Recent Labs      06/18/19   0334  06/19/19   0310  06/20/19   0310   SODIUM  132*  132*  131*   POTASSIUM  3.9  3.7  4.0   CHLORIDE  101  100  98   CO2  24  23  22   GLUCOSE  102*  123*  79   BUN  11  10  10     Recent Labs      06/18/19   0334  06/19/19   0310  06/20/19   0310   CREATININE  0.42*  0.31*  0.39*       Imaging:  MRI of the brain on 6/8/2019  Impression       1.  Interval progression of supra and infratentorial intra-axial nodules and associated edema. This short-term interval progression favors infection over neoplasm though both remain considerations.  2.  Mild atrophy         Micro:  Results     Procedure Component Value Units Date/Time    AFB Culture [[id number]] Collected:  06/19/19 0930    Order Status:  Completed Specimen:  Tissue from Other Body Fluid Updated:  06/20/19 1238     Significant Indicator NEG     Source TISS     Site Right Hip deep bone     Culture Result Culture in progress.     AFB Smear Results No acid fast bacilli seen.    Narrative:       Collected By:[id number] HARRIS  Bone biopsy right hip  Collected By:[id number] HARRIS  Right hip fluid collection  Collected By:[id number] HARRIS    Fungal Smear [[id number]] Collected:  06/19/19 0930    Order Status:  Completed Specimen:  Tissue from Other Body Fluid Updated:  06/20/19 1238     Significant Indicator NEG     Source TISS     Site Right Hip deep bone     Fungal Smear Results No fungal elements seen.    Narrative:        Collected By:[id number] BEATRICE HARRIS  Bone biopsy right hip  Collected By:[id number] BEATRICE HARRIS  Right hip fluid collection  Collected By:[id number] BEATRICE HARRIS    CULTURE TISSUE W/ GRM STAIN [[id number]] Collected:  06/19/19 0930    Order Status:  Completed Specimen:  Tissue Updated:  06/20/19 1238     Significant Indicator NEG     Source TISS     Site Right Hip deep bone     Culture Result No growth at 24 hours.     Gram Stain Result No organisms seen.    Narrative:       Collected By:[id number] BEATRICE HARRIS  Bone biopsy right hip  Collected By:[id number] BEATRICE HARRIS  Right hip fluid collection  Collected By:[id number] BEATRICE HARRIS    GRAM STAIN [[id number]] Collected:  06/19/19 0930    Order Status:  Completed Specimen:  Tissue Updated:  06/19/19 2107     Significant Indicator .     Source TISS     Site Right Hip deep bone     Gram Stain Result No organisms seen.    Narrative:       Collected By:[id number] BEATRICE HARRIS  Bone biopsy right hip  Collected By:[id number] BEATRICE HARRIS  Right hip fluid collection  Collected By:[id number] BEATRICE HARRIS    Acid Fast Stain [[id number]] Collected:  06/19/19 0930    Order Status:  Completed Specimen:  Tissue Updated:  06/19/19 2107     Significant Indicator NEG     Source TISS     Site Right Hip deep bone     AFB Smear Results No acid fast bacilli seen.    Narrative:       Collected By:[id number] BEATRICE HARRIS  Bone biopsy right hip  Collected By:[id number] BEATRICE HARRIS  Right hip fluid collection  Collected By:[id number] BEATRICE HARRIS    Fungal Culture [[id number]] Collected:  06/19/19 0930    Order Status:  Completed Specimen:  Other Updated:  06/19/19 1233    Narrative:       Collected By:[id number] BEATRICE HARRIS  Bone biopsy right hip  Collected By:[id number] BEATRICE HARRIS  Right hip fluid collection    FLUID CULTURE W/GRAM STAIN [[id number]] Collected:  06/19/19 0930    Order Status:  Canceled Specimen:  Other from Other Body Fluid     AFB STAIN ONLY [[id number]] Collected:  06/19/19 0930    Order Status:  Canceled  "Specimen:  Other from Other Body Fluid     AFB Culture [[id number]] Collected:  06/19/19 0930    Order Status:  Sent Specimen:  Body Fluid from Other Body Fluid     Fungal Culture [[id number]] Collected:  06/19/19 0930    Order Status:  Sent Specimen:  Bone from Other Tissue     FUNGAL BLOOD CULTURE [[id number]]     Order Status:  No result Specimen:  Blood from Blood     FLUID CULTURE W/GRAM STAIN [[id number]]     Order Status:  No result Specimen:  Body Fluid from Other Body Fluid     Fungal Culture [[id number]]     Order Status:  Canceled Specimen:  Other from Other Tissue     Fungal Smear [[id number]]     Order Status:  Canceled Specimen:  Body Fluid from Other Tissue           Assessment:  67 yo female with:  Gluteal and iliopsoas abscess  Brain abscesses vs mets  Breast cancer  DM2    Plan:  Brain abscesses vs mets.  Persistent with interval progression on last MRI  MRI 4/23 \"supra and infratentorial brain parenchyma. Decrease in the size of the lesions. Interval reduction in the extent of the surrounding white matter edema consistent with improvement/treatment response of the most of the multifocal brain abscesses.  MRI 5/21 showed innumerable microabscesses vs mets  Abx (vancomycin, cefepime and Flagyl) discontinued on 5/23 after ~8 weeks of treatment-developed new fevers on 6/4  MRI on 6/8 with interval progression of supra and infratentorial intra--axial nodules and associated edema.  New right thalamus lesion. Radiology favors infection over neoplasm though both remain considerations (had been off abx)  Neurosurgery evaluation - recommended continuing antibiotics for another 2 weeks and then repeating MRI brain, as the lesions are not amenable to biopsy   Mult blood cultures neg  CHAS neg 3/15 and 5/24  +QuantiGold as above  Continue vanco/cefepime  Fluconazole 800 mg daily was added on 6/9 and is being continued at this time  Monitor renal function and Vanco trough  Vanco trough 18.6 6/15  Plan for repeat MRI " "around 6/21/2019 to assess for improvement  Will need to reassess need for biopsy if persistent or worse, and sent for pathology, regular, AFB, fungal cultures     Latent TB  As CNS lesions previously improved on nontuberculosis treatment, continuing as above  Repeat MRI in 6/8 with interval progression   If progression noted on repeat MRI brain or pelvis CT, will need biopsy with pathology, AFB, fungal, bacterial cultures    Fever of unknown origin.  Resolved  Fever and chills 6/4  Cultures negative  Now afebrile  QuantiGold+. CXR neg    Gluteal and iliopsoas abscess s/p treatment.   Adjacent to hardware in right hip (placed 12/17/18)  CT 4/23 \"Fluid collections within the right gluteal and iliacis muscles.. The collection within the right gluteal muscle has unchanged while the fluid collection within the right iliacis muscle is increased somewhat in size.\" 2.7 cm  Cultures 3/29 and 4/4 neg  MRI on 5/20/2019 - interval decrease in collection in R gluteal muscle and persistent multiloculated collection in R iliacus muscle.   CT 5/28 no change  s/p drainage on 5/30/2019-cultures negative  S/p removal hardware 6/5-no cultures done  Panculture neg  1, 3 beta glucan neg  S/p removal hardware 6/5/19  CT on 6/8 with residual abscess in the right iliacus muscle, 2.5 x 1.8 cm, slightly smaller than prior  Repeat CT 6/17 showed persistent fluid collections. I reviewed CT with radiology, Dr. Cadena and the right iliac fluid collection is amenable for aspiration, however it's high risk for colon injury. For this reason, we elected not to have aspiration of the fluid collections, but rather the bone biopsy of the iliac bone. The path is pending.    Pt did have CT-guided deep bone biopsy  on 6/19. Bone sample was sent for gram stain/routine culture, fungal smear/cx, and AFB smear/cx.  The cultures are negative so far.    Type 2 DM  Hemoglobin A1c 6.3   Keep -140s    Discussed with Dr. Barkley, Hospital Medicine    ID will " follow.  Please call with questions.

## 2019-06-20 NOTE — THERAPY
"Pt demonstrating imapired balance and weight bearing. Pt w/decreased initiation reuqiirng facilitation adn physical assist, though able to stand at EOB for a short period of time. Pt limited by L hip pain, provided w/ice pack post session. Pt would benefit from further acute PT txs to progress towards goals and independence. Recommen post acute placement.    Physical Therapy Treatment completed.   Bed Mobility:  Supine to Sit: Minimal Assist  Transfers: Sit to Stand: Minimal Assist  Gait: Level Of Assist: Unable to Participate        Plan of Care: Will benefit from Physical Therapy 3 times per week    See \"Rehab Therapy-Acute\" Patient Summary Report for complete documentation.       "

## 2019-06-20 NOTE — PROGRESS NOTES
Hospital Medicine Daily Progress Note    Date of Service  6/20/2019    Chief Complaint  persistent abscess.    Hospital Course     Ms. Martini is a 68-year-old female who was transferred from [facility] on 5/29/2019 with persistent right gluteal abscesses since sacral fracture repair in December.  She has had multiple IR drainage.  She initially presented with pelvic pain, low back pain, and confusion. She also had new slurred speech. Imaging of the abdomen, pelvis and brain revealed abscesses and she was treated with a full course of IV antibiotics per ID.  Her slurred speech improved and she was able to ambulate with physical therapy. However, repeat imaging showed persistent pelvic abscesses.  MRI brain shows increase in brain lesions.  Her brain lesions are known from prior imaging and there is concern they represent metastasis.  Oncology aware and do not recommend further imaging. Cultures remained negative and a CHAS done by Dr. Potter showed no vegetations. The size of the pelvic abscesses was reviewed by interventional radiology and the case was discussed with Dr. Finley who recommended transfer to [facility] for CT guided drainage. She underwent drainage on 5/30. On 6/4, she became febrile again, so restarted on cefepime and vancomycin per ID and has been on them since.  Right hip hardware was removed on 6/5. Repeat MRI brain on 6/7/19 saw progression of the supra and infratentorial intra-axial nodules with edema, prompting a Neurosurgery consult.  Dr. Nguyen stated lesions are not amendable to biopsy stereotactically, and favors infectious etiology, recommended continued antibiotics, antineuroleptic and repeat brain MRI in 2 weeks (around 6/21).  Repeat CT pelvis on 6/9 showed residual fluid collection 2.5 x 1.8 cm in right iliacus muscle., slightly smaller than before.  Cefepime + vanc were continue. Work up was broadened to include fungal etiology, and she is started on empiric started fluconazole. Repeat CT on 6/17  showed no significant interval change in the size of the right iliacus muscle fluid collections, with changes in the right iliac bone, bilateral sacrum, and left iliac bone suspicious for osteomyelitis, and changes at L5-S1 suspicious for discitis/osteomyelitis, along with hyperdense right gluteal lesion, moderately suspicious for postoperative pseudoaneurysm with adjacent hematoma. IR felt aspiration of the fluid will have low yield, and so recommended biopsy of the nonhealing right iliac fracture area instead.      Interval Problem Update  6/20/2019 - I reviewed the patient's chart. There were no significant overnight events. Remains hemodynamically stable and afebrile. Stable on RA.  No leukocytosis.  Hemoglobin stable.  Sodium 131.  Potassium is normal, and creatinine is stable.  Magnesium remains low at 1.4.  Biopsy of the hip showed no AFB, organism, or fungal elements.    > I have personally seen and examined the patient today.  No complaints of pain when she is just still, but gets pain on the left hip when.  No other complaints.  No nausea or vomiting.  No chest pain or shortness of breath.  She is having regular bowel movements.  She is passing gas.        Consultants/Specialty  Infectious disease  Orthopedic surgery  Neurosurgery, Dr. Nguyen  IR    Code Status  DNR/DNI    Disposition  Monitor on the floors.   Anticipate discharge to SNF once medically cleared. Accepted by [facility]    Review of Systems  ROS     Pertinent positives/negatives as mentioned above.     A complete review of systems was personally done by me. All other systems were negative.       Physical Exam  Temp:  [36 °C (96.8 °F)-36.7 °C (98.1 °F)] 36.4 °C (97.6 °F)  Pulse:  [64-92] 71  Resp:  [16-17] 16  BP: (121-158)/(65-73) 121/65  SpO2:  [93 %-97 %] 96 %    Physical Exam   Constitutional: She is oriented to person, place, and time. She appears well-developed and well-nourished. No distress.   HENT:   Head: Normocephalic and  atraumatic.   Mouth/Throat: Oropharynx is clear and moist. No oropharyngeal exudate.   Eyes: Pupils are equal, round, and reactive to light. Conjunctivae are normal. No scleral icterus.   Neck: Normal range of motion. Neck supple.   Cardiovascular: Normal rate and regular rhythm.  Exam reveals no gallop and no friction rub.    No murmur heard.  Pulmonary/Chest: Effort normal and breath sounds normal. No respiratory distress. She has no wheezes. She has no rales. She exhibits no tenderness.   Abdominal: Soft. Bowel sounds are normal. She exhibits no distension. There is no tenderness. There is no rebound and no guarding.   Musculoskeletal: She exhibits tenderness. She exhibits no edema.   Limited range of motion of the left hip more than the right.  Moderate tenderness.   Lymphadenopathy:     She has no cervical adenopathy.   Neurological: She is alert and oriented to person, place, and time. No cranial nerve deficit.   Skin: Skin is warm and dry. No rash noted. She is not diaphoretic. No erythema. No pallor.   Psychiatric: She has a normal mood and affect. Her behavior is normal. Judgment and thought content normal.   Nursing note and vitals reviewed.    I have performed the physical examination today 6/20/2019.  In review of yesterday's note, there are no new changes except as documented above.      Fluids    Intake/Output Summary (Last 24 hours) at 06/20/19 1029  Last data filed at 06/20/19 0000   Gross per 24 hour   Intake              730 ml   Output                0 ml   Net              730 ml       Laboratory  Recent Labs      06/18/19   0334  06/19/19   0310  06/20/19   0310   WBC  6.9  5.9  7.4   RBC  3.92*  3.88*  4.18*   HEMOGLOBIN  10.9*  11.2*  11.6*   HEMATOCRIT  35.7*  34.1*  37.2   MCV  91.1  87.9  89.0   MCH  27.8  28.9  27.8   MCHC  30.5*  32.8*  31.2*   RDW  61.0*  58.8*  59.9*   PLATELETCT  302  290  296   MPV  9.1  9.6  9.4     Recent Labs      06/18/19   0334  06/19/19   0310  06/20/19   0310    SODIUM  132*  132*  131*   POTASSIUM  3.9  3.7  4.0   CHLORIDE  101  100  98   CO2  24  23  22   GLUCOSE  102*  123*  79   BUN  11  10  10   CREATININE  0.42*  0.31*  0.39*   CALCIUM  8.1*  8.0*  8.2*     Recent Labs      06/18/19   1603   INR  1.17*               Imaging    Assessment/Plan  * Abscess of right iliac muscle and right gluteus medius muscle- (present on admission)   Assessment & Plan    - Recurrent issue since sacral fx repair in December 2018. Has had multiple IR drainage and antibiotics. Recent cultures remain unrevealing.    - s/p biopsy of the nonhealing right iliac fracture area. Follow cultures. So far showed no AFB, organism, or fungal elements.  - continue current antibiotics with cefepime, Diflucan, and vancomycin.  Await further inputs from ID.     Sepsis (HCC)   Assessment & Plan    -This is sepsis (without associated acute organ dysfunction).  Likely from her abscesses.  Blood cultures negative.  -Sepsis has resolved.  -Continue antibiotics with cefepime, Vancomycin, Diflucan as per ID.  Further work-up as above.  -Continue close hemodynamic monitoring.  Trend WBC count.  Watch for fevers.     Pseudoaneurysm following procedure (HCC)- (present on admission)   Assessment & Plan    -Likely postoperative.  No further interventions needed.  Monitor.     Hypomagnesemia- (present on admission)   Assessment & Plan    -Remains low. Will replace again with 4 g IV magnesium.  Repeat magnesium level in the morning.     Hypercalcemia- (present on admission)   Assessment & Plan    -Resolved.     Brain lesion- (present on admission)   Assessment & Plan    - repeat MRI tomorrow (6/21/2019).  Continue IV antibiotics per ID.     Acquired circulating anticoagulants (HCC)- (present on admission)   Assessment & Plan    - Resume xarelto.     Non-severe protein-calorie malnutrition (HCC)- (present on admission)   Assessment & Plan    -Moderate.  -Continue nutrition support per RD.       History of DVT (deep  vein thrombosis)- (present on admission)   Assessment & Plan    -Resume xarelto.     Essential hypertension- (present on admission)   Assessment & Plan    -Maintaining good control.  Continue Lopressor, Cozaar, and Norvasc.  Monitor blood pressure trend closely.     Chronic hyponatremia- (present on admission)   Assessment & Plan    - remains stable. Continue daily monitoring.      RLS (restless legs syndrome)- (present on admission)   Assessment & Plan    - Continue on pramipexole.      Chronic pain- (present on admission)   Assessment & Plan    - well controlled. Primarily located at left hip.  -Continue as needed oxycodone, Flexeril, and Tylenol.     History of breast cancer- (present on admission)   Assessment & Plan    -S/p left mastectomy and breast implant.  -Needs follow-up as outpatient.     COPD (chronic obstructive pulmonary disease) (HCC)- (present on admission)   Assessment & Plan    - Not on any exacerbation at this time, continue RT protocol.          VTE prophylaxis: xarelto

## 2019-06-20 NOTE — PROGRESS NOTES
"Pharmacy Kinetics 68 y.o. female on vancomycin day # 17   2019    Currently Dose: Vancomycin 1200 mg iv q24hr (~21 mg/kg)  Received Load Dose: Yes     Indication for Treatment: brain abscess + iliopsoas abscess  ID Service Following: Yes     Pertinent history per medical record: Admitted on 2019 for pelvic abscess (LTACH transfer) after recent admission at [facility] ([place] Concerns for brain and iliopsoas abscess. NSG and ID consulted (I&D per IR 19; hardware removal 19).      Other antibiotics: cefepime 2 gm iv q12h, fluconazole 800 mg po qd     Allergies: Patient has no known allergies.      List concerns for accumulation of vancomycin: age 68, low body weight, abnormal LFT's, BUN:SCr ~20:1     Pertinent cultures to date:   19: C.Diff: Negative   19: PBCx2: NGTD   19: Pelvic abscess,BF: NGTD      MRSA nares swab if pneumonia is a concern (ordered/positive/negative/n-a): n/a    Recent Labs      19   0334  19   0310  19   0310   WBC  6.9  5.9  7.4   NEUTSPOLYS  67.40  64.90  66.90     Recent Labs      19   0334  19   0310  19   0310   BUN  11  10  10   CREATININE  0.42*  0.31*  0.39*     Recent Labs      19   1206  19   1228   VANCOTROUGH  16.9  20.1*     Intake/Output Summary (Last 24 hours) at 19 0728  Last data filed at 19 0000   Gross per 24 hour   Intake              730 ml   Output                0 ml   Net              730 ml      /70   Pulse 92   Temp 36 °C (96.8 °F) (Temporal)   Resp 17   Ht 1.575 m (5' 2\")   Wt 59.2 kg (130 lb 8.2 oz)   SpO2 97%  Temp (24hrs), Av.6 °C (97.8 °F), Min:36 °C (96.8 °F), Max:37.3 °C (99.1 °F)    Estimated Creatinine Clearance: 106.2 mL/min (A) (by C-G formula based on SCr of 0.39 mg/dL (L)).    A/P   1. Vancomycin dose change: not indicated   2. Next vancomycin level: 19 @1230 (ordered)  3. Goal trough: 18-22 mcg/mL  4. Comments: VS stable. Afebrile. WBC stable. " CrCl ~106 mL/min (SCr stable). No new microbiology. Concerns for accumulation of vancomycin noted. Most recent vancomycin level noted. Repeat vancomycin trough in place prior to PM dose 6/22/19 to assess clearance. BMP with AM labs. Pharmacy will continue to follow.    Kulwant Adhikari, PharmD

## 2019-06-20 NOTE — PROGRESS NOTES
"Received alert and oriented x 4. Check vitals sign and recorded accordingly and due med given per MAR. Monitor sign and symptoms of respiratory distress and treatment given accordingly per MAR.Medicated per MAR and reassessed every 2 hours per protocol. Call light within reach. Bed alarm in placed. Post biopsy site dry and clean dressing no sign of bleeding. Needs attended. Will continue to monitor./70   Pulse 64   Temp 36.1 °C (97 °F) (Temporal)   Resp 17   Ht 1.575 m (5' 2\")   Wt 59.2 kg (130 lb 8.2 oz)   SpO2 93%   Breastfeeding? No   BMI 23.87 kg/m² .  "

## 2019-06-21 LAB
ANION GAP SERPL CALC-SCNC: 7 MMOL/L (ref 0–11.9)
BUN SERPL-MCNC: 13 MG/DL (ref 8–22)
CALCIUM SERPL-MCNC: 8.3 MG/DL (ref 8.5–10.5)
CHLORIDE SERPL-SCNC: 102 MMOL/L (ref 96–112)
CO2 SERPL-SCNC: 22 MMOL/L (ref 20–33)
CREAT SERPL-MCNC: 0.44 MG/DL (ref 0.5–1.4)
GLUCOSE SERPL-MCNC: 118 MG/DL (ref 65–99)
MAGNESIUM SERPL-MCNC: 1.6 MG/DL (ref 1.5–2.5)
POTASSIUM SERPL-SCNC: 4.5 MMOL/L (ref 3.6–5.5)
SODIUM SERPL-SCNC: 131 MMOL/L (ref 135–145)

## 2019-06-21 PROCEDURE — A9270 NON-COVERED ITEM OR SERVICE: HCPCS | Performed by: INTERNAL MEDICINE

## 2019-06-21 PROCEDURE — 700101 HCHG RX REV CODE 250: Performed by: NURSE PRACTITIONER

## 2019-06-21 PROCEDURE — 700102 HCHG RX REV CODE 250 W/ 637 OVERRIDE(OP): Performed by: INTERNAL MEDICINE

## 2019-06-21 PROCEDURE — 99233 SBSQ HOSP IP/OBS HIGH 50: CPT | Performed by: INTERNAL MEDICINE

## 2019-06-21 PROCEDURE — A9270 NON-COVERED ITEM OR SERVICE: HCPCS | Performed by: HOSPITALIST

## 2019-06-21 PROCEDURE — 700102 HCHG RX REV CODE 250 W/ 637 OVERRIDE(OP): Performed by: NURSE PRACTITIONER

## 2019-06-21 PROCEDURE — 36415 COLL VENOUS BLD VENIPUNCTURE: CPT

## 2019-06-21 PROCEDURE — 99232 SBSQ HOSP IP/OBS MODERATE 35: CPT | Performed by: INTERNAL MEDICINE

## 2019-06-21 PROCEDURE — 700102 HCHG RX REV CODE 250 W/ 637 OVERRIDE(OP): Performed by: HOSPITALIST

## 2019-06-21 PROCEDURE — 97535 SELF CARE MNGMENT TRAINING: CPT

## 2019-06-21 PROCEDURE — 80048 BASIC METABOLIC PNL TOTAL CA: CPT

## 2019-06-21 PROCEDURE — 700105 HCHG RX REV CODE 258: Performed by: INTERNAL MEDICINE

## 2019-06-21 PROCEDURE — A9270 NON-COVERED ITEM OR SERVICE: HCPCS | Performed by: NURSE PRACTITIONER

## 2019-06-21 PROCEDURE — 770006 HCHG ROOM/CARE - MED/SURG/GYN SEMI*

## 2019-06-21 PROCEDURE — 83735 ASSAY OF MAGNESIUM: CPT

## 2019-06-21 PROCEDURE — 700111 HCHG RX REV CODE 636 W/ 250 OVERRIDE (IP): Performed by: INTERNAL MEDICINE

## 2019-06-21 PROCEDURE — 700111 HCHG RX REV CODE 636 W/ 250 OVERRIDE (IP): Performed by: HOSPITALIST

## 2019-06-21 PROCEDURE — 700105 HCHG RX REV CODE 258: Performed by: HOSPITALIST

## 2019-06-21 PROCEDURE — 97530 THERAPEUTIC ACTIVITIES: CPT

## 2019-06-21 RX ADMIN — ACETAMINOPHEN 1000 MG: 500 TABLET ORAL at 18:28

## 2019-06-21 RX ADMIN — AMLODIPINE BESYLATE 10 MG: 5 TABLET ORAL at 04:46

## 2019-06-21 RX ADMIN — PRAMIPEXOLE DIHYDROCHLORIDE 1 MG: 0.5 TABLET ORAL at 04:46

## 2019-06-21 RX ADMIN — POTASSIUM CHLORIDE 40 MEQ: 20 TABLET, EXTENDED RELEASE ORAL at 18:28

## 2019-06-21 RX ADMIN — OMEPRAZOLE 20 MG: 20 CAPSULE, DELAYED RELEASE ORAL at 04:45

## 2019-06-21 RX ADMIN — ACETAMINOPHEN 1000 MG: 500 TABLET ORAL at 04:45

## 2019-06-21 RX ADMIN — RIVAROXABAN 20 MG: 20 TABLET, FILM COATED ORAL at 18:28

## 2019-06-21 RX ADMIN — LIDOCAINE 2 PATCH: 50 PATCH CUTANEOUS at 13:05

## 2019-06-21 RX ADMIN — Medication 1 CAPSULE: at 09:04

## 2019-06-21 RX ADMIN — MAGNESIUM OXIDE TAB 400 MG (241.3 MG ELEMENTAL MG) 400 MG: 400 (241.3 MG) TAB at 18:28

## 2019-06-21 RX ADMIN — CINACALCET HYDROCHLORIDE 60 MG: 30 TABLET, COATED ORAL at 04:46

## 2019-06-21 RX ADMIN — TEMAZEPAM 15 MG: 15 CAPSULE ORAL at 20:05

## 2019-06-21 RX ADMIN — PRAMIPEXOLE DIHYDROCHLORIDE 1 MG: 0.5 TABLET ORAL at 18:28

## 2019-06-21 RX ADMIN — SUCRALFATE 1 G: 1 SUSPENSION ORAL at 04:45

## 2019-06-21 RX ADMIN — METOPROLOL TARTRATE 25 MG: 25 TABLET, FILM COATED ORAL at 04:46

## 2019-06-21 RX ADMIN — FLUCONAZOLE 800 MG: 200 TABLET ORAL at 04:45

## 2019-06-21 RX ADMIN — SUCRALFATE 1 G: 1 SUSPENSION ORAL at 00:31

## 2019-06-21 RX ADMIN — SUCRALFATE 1 G: 1 SUSPENSION ORAL at 13:04

## 2019-06-21 RX ADMIN — POTASSIUM CHLORIDE 40 MEQ: 20 TABLET, EXTENDED RELEASE ORAL at 04:46

## 2019-06-21 RX ADMIN — SENNOSIDES AND DOCUSATE SODIUM 2 TABLET: 8.6; 5 TABLET ORAL at 18:28

## 2019-06-21 RX ADMIN — CEFEPIME 2 G: 2 INJECTION, POWDER, FOR SOLUTION INTRAVENOUS at 18:29

## 2019-06-21 RX ADMIN — LOSARTAN POTASSIUM 50 MG: 50 TABLET ORAL at 04:45

## 2019-06-21 RX ADMIN — CEFEPIME 2 G: 2 INJECTION, POWDER, FOR SOLUTION INTRAVENOUS at 04:42

## 2019-06-21 RX ADMIN — ATORVASTATIN CALCIUM 10 MG: 10 TABLET, FILM COATED ORAL at 18:28

## 2019-06-21 RX ADMIN — MAGNESIUM OXIDE TAB 400 MG (241.3 MG ELEMENTAL MG) 400 MG: 400 (241.3 MG) TAB at 04:46

## 2019-06-21 RX ADMIN — OXYCODONE HYDROCHLORIDE 5 MG: 5 TABLET ORAL at 20:05

## 2019-06-21 RX ADMIN — PRAMIPEXOLE DIHYDROCHLORIDE 1 MG: 0.5 TABLET ORAL at 13:04

## 2019-06-21 RX ADMIN — VANCOMYCIN HYDROCHLORIDE 1200 MG: 100 INJECTION, POWDER, LYOPHILIZED, FOR SOLUTION INTRAVENOUS at 13:04

## 2019-06-21 RX ADMIN — METOPROLOL TARTRATE 25 MG: 25 TABLET, FILM COATED ORAL at 18:28

## 2019-06-21 RX ADMIN — OXYCODONE HYDROCHLORIDE 5 MG: 5 TABLET ORAL at 04:46

## 2019-06-21 RX ADMIN — SUCRALFATE 1 G: 1 SUSPENSION ORAL at 18:00

## 2019-06-21 ASSESSMENT — ENCOUNTER SYMPTOMS
DIZZINESS: 0
ABDOMINAL PAIN: 0
COUGH: 0
VOMITING: 0
FEVER: 0
HEADACHES: 0
SHORTNESS OF BREATH: 0
DIARRHEA: 0
NAUSEA: 0
CHILLS: 0

## 2019-06-21 NOTE — PROGRESS NOTES
· 2 RN skin check complete with PATRICK Palma.   · Devices in place PIV, staples on right hip, glasses.  · Skin assessed under devices - Yes.  · Confirmed pressure ulcers found on - NA.  · No new potential pressure ulcers noted.    · Heels are red and blanching.  · The following interventions are in place - pressure redistribution mattress, waffle overlay pt is able to reposition self.

## 2019-06-21 NOTE — PROGRESS NOTES
Hospital Medicine Daily Progress Note    Date of Service  6/21/2019    Chief Complaint  persistent abscess.    Hospital Course     Ms. Martini is a 68-year-old female who was transferred from [facility] on 5/29/2019 with persistent right gluteal abscesses since sacral fracture repair in December.  She has had multiple IR drainage.  She initially presented with pelvic pain, low back pain, and confusion. She also had new slurred speech. Imaging of the abdomen, pelvis and brain revealed abscesses and she was treated with a full course of IV antibiotics per ID.  Her slurred speech improved and she was able to ambulate with physical therapy. However, repeat imaging showed persistent pelvic abscesses.  MRI brain shows increase in brain lesions.  Her brain lesions are known from prior imaging and there is concern they represent metastasis.  Oncology aware and do not recommend further imaging. Cultures remained negative and a CHAS done by Dr. Potter showed no vegetations. The size of the pelvic abscesses was reviewed by interventional radiology and the case was discussed with Dr. Finley who recommended transfer to [facility] for CT guided drainage. She underwent drainage on 5/30. On 6/4, she became febrile again, so restarted on cefepime and vancomycin per ID and has been on them since.  Right hip hardware was removed on 6/5. Repeat MRI brain on 6/7/19 saw progression of the supra and infratentorial intra-axial nodules with edema, prompting a Neurosurgery consult.  Dr. Nguyen stated lesions are not amendable to biopsy stereotactically, and favors infectious etiology, recommended continued antibiotics, antineuroleptic and repeat brain MRI in 2 weeks (around 6/21).  Repeat CT pelvis on 6/9 showed residual fluid collection 2.5 x 1.8 cm in right iliacus muscle., slightly smaller than before.  Cefepime + vanc were continue. Work up was broadened to include fungal etiology, and she is started on empiric started fluconazole. Repeat CT on 6/17  "showed no significant interval change in the size of the right iliacus muscle fluid collections, with changes in the right iliac bone, bilateral sacrum, and left iliac bone suspicious for osteomyelitis, and changes at L5-S1 suspicious for discitis/osteomyelitis, along with hyperdense right gluteal lesion, moderately suspicious for postoperative pseudoaneurysm with adjacent hematoma. IR felt aspiration of the fluid will have low yield, and so recommended biopsy of the nonhealing right iliac fracture area instead. Biopsy of the hip showed no AFB, organism, or fungal elements.      Interval Problem Update  6/21/2019 - I reviewed the patient's chart today. Uneventful night. VSS. Afebrile. Saturating well on RA.  Sodium stable at 131.  Renal function remains normal.  Magnesium has normalized to 1.6. For repeat brain MRI today.    > I have personally seen and examined the patient today.  She has pain on the left hip when she moves, but none when she is just still.  No other complaints.  No nausea vomiting.  No chest pain or shortness of breath.  She is hoping that the brain lesions are smaller, stating that \"I am tired of this after 6 months\".  She is in good spirits today, knitting hats for somebody.        Consultants/Specialty  Infectious disease  Orthopedic surgery  Neurosurgery, Dr. Nguyen  IR    Code Status  DNR/DNI    Disposition  Monitor on the floors.   Anticipate discharge to SNF once medically cleared. [facility]    Review of Systems  ROS     Pertinent positives/negatives as mentioned above.     A complete review of systems was personally done by me. All other systems were negative.       Physical Exam  Temp:  [36.6 °C (97.9 °F)-37.2 °C (99 °F)] 36.9 °C (98.5 °F)  Pulse:  [78-81] 80  Resp:  [16-18] 18  BP: (121-141)/(63-77) 141/77  SpO2:  [94 %-96 %] 94 %    Physical Exam   Constitutional: She is oriented to person, place, and time. She appears well-developed and well-nourished. No distress. "   HENT:   Head: Normocephalic and atraumatic.   Mouth/Throat: Oropharynx is clear and moist. No oropharyngeal exudate.   Eyes: Pupils are equal, round, and reactive to light. Conjunctivae are normal. No scleral icterus.   Neck: Normal range of motion. Neck supple.   Cardiovascular: Normal rate and regular rhythm.  Exam reveals no gallop and no friction rub.    No murmur heard.  Pulmonary/Chest: Effort normal and breath sounds normal. No respiratory distress. She has no wheezes. She has no rales. She exhibits no tenderness.   Abdominal: Soft. Bowel sounds are normal. She exhibits no distension. There is no tenderness. There is no rebound and no guarding.   Musculoskeletal: She exhibits tenderness. She exhibits no edema.   Limited range of motion of the left hip more than the right.  Moderate tenderness.   Lymphadenopathy:     She has no cervical adenopathy.   Neurological: She is alert and oriented to person, place, and time. No cranial nerve deficit.   Skin: Skin is warm and dry. No rash noted. She is not diaphoretic. No erythema. No pallor.   Psychiatric: She has a normal mood and affect. Her behavior is normal. Judgment and thought content normal.   Nursing note and vitals reviewed.    I have performed the physical examination today 6/21/2019.  In review of yesterday's note, there are no new changes except as documented above.        Fluids    Intake/Output Summary (Last 24 hours) at 06/21/19 0726  Last data filed at 06/20/19 1400   Gross per 24 hour   Intake              480 ml   Output                0 ml   Net              480 ml       Laboratory  Recent Labs      06/19/19   0310  06/20/19   0310   WBC  5.9  7.4   RBC  3.88*  4.18*   HEMOGLOBIN  11.2*  11.6*   HEMATOCRIT  34.1*  37.2   MCV  87.9  89.0   MCH  28.9  27.8   MCHC  32.8*  31.2*   RDW  58.8*  59.9*   PLATELETCT  290  296   MPV  9.6  9.4     Recent Labs      06/19/19   0310  06/20/19   0310  06/21/19   0053   SODIUM  132*  131*  131*   POTASSIUM  3.7   4.0  4.5   CHLORIDE  100  98  102   CO2  23  22  22   GLUCOSE  123*  79  118*   BUN  10  10  13   CREATININE  0.31*  0.39*  0.44*   CALCIUM  8.0*  8.2*  8.3*     Recent Labs      06/18/19   1603   INR  1.17*               Imaging    Assessment/Plan  * Abscess of right iliac muscle and right gluteus medius muscle- (present on admission)   Assessment & Plan    - Recurrent issue since sacral fx repair in December 2018. Has had multiple IR drainage and antibiotics. Recent cultures remain unrevealing.    - s/p biopsy of the nonhealing right iliac fracture area. Continue to follow cultures. So far showed no AFB, organism, or fungal elements.  - continue current antibiotics with cefepime, Diflucan, and vancomycin.  ID following, await antibiotic recommendations.      Sepsis (HCC)   Assessment & Plan    -This is sepsis (without associated acute organ dysfunction).  Likely from her abscesses.  Blood cultures negative.  -Sepsis has resolved.  -Continue antibiotics with cefepime, Vancomycin, Diflucan as per ID.  Further work-up as above.  -Continue close hemodynamic monitoring.  Trend WBC count.  Watch for fevers.     Pseudoaneurysm following procedure (HCC)- (present on admission)   Assessment & Plan    -Likely postoperative.  No further interventions needed.  Monitor.     Hypomagnesemia- (present on admission)   Assessment & Plan    -Mg has normalized. Increase magnesium oxide to BID. Repeat magnesium level in the morning.     Hypercalcemia- (present on admission)   Assessment & Plan    -Resolved.     Brain lesion- (present on admission)   Assessment & Plan    - await repeat MRI today.  If lesions are persistent or larger, will need biopsy.  - continue IV antibiotics per ID.     Acquired circulating anticoagulants (HCC)- (present on admission)   Assessment & Plan    - continue xarelto.     Non-severe protein-calorie malnutrition (HCC)- (present on admission)   Assessment & Plan    -Moderate.  -Continue nutrition support per  RD.       History of DVT (deep vein thrombosis)- (present on admission)   Assessment & Plan    -continue xarelto.     Essential hypertension- (present on admission)   Assessment & Plan    -good control.  Continue Lopressor, Cozaar, and Norvasc.  Monitor blood pressure trend closely.     Chronic hyponatremia- (present on admission)   Assessment & Plan    - remains stable. Continue daily monitoring.      RLS (restless legs syndrome)- (present on admission)   Assessment & Plan    - Continue on pramipexole.      Chronic pain- (present on admission)   Assessment & Plan    - well controlled. Primarily located at left hip.  -Continue as needed oxycodone, Flexeril, and Tylenol.     History of breast cancer- (present on admission)   Assessment & Plan    -S/p left mastectomy and breast implant.  -Needs follow-up as outpatient.     COPD (chronic obstructive pulmonary disease) (HCC)- (present on admission)   Assessment & Plan    - Not on any exacerbation at this time, continue RT protocol.          VTE prophylaxis: xarelto

## 2019-06-21 NOTE — PROGRESS NOTES
·  RN skin check completed w/ Juliana NGUYEN   · Devices in place: glasses, piv,   · Skin assessed under devices: intact.  · Confirmed pressure ulcers found on: n/a .  · New potential pressure ulcers noted on : n/a  · The following interventions are in place: ensuring pt remains free of moisture, incontinent pads under pt, encouraging pt to turn, waffle mattress in use.

## 2019-06-21 NOTE — THERAPY
"Occupational Therapy Treatment completed with focus on ADLs and ADL transfers.  Functional Status:  Modified independent seated for light grooming, hygiene in bedside chair; max assist for LE dressing due to hip pain; functional transfer this afternoon with FWW and min assist for safety   Plan of Care: Will benefit from Occupational Therapy 3 times per week  Discharge Recommendations:  Equipment Will Continue to Assess for Equipment Needs. Post-acute therapy Discharge to a transitional care facility for continued skilled therapy services.    See \"Rehab Therapy-Acute\" Patient Summary Report for complete documentation.   "

## 2019-06-21 NOTE — PROGRESS NOTES
Received handoff report from PATRICK Aguilera and assumed pt care at 1900.  Pt resting in bed comfortably. Labs reviewed. Patient and RN discussed plan of care and questions were answered. Bed in lowest and locked position and bed alarm in place. Call light and personal belongings within reach.

## 2019-06-21 NOTE — CARE PLAN
Problem: Communication  Goal: The ability to communicate needs accurately and effectively will improve  Outcome: PROGRESSING AS EXPECTED  Pt is able to verbalize needs and uses call light appropriately.     Problem: Safety  Goal: Will remain free from falls  Outcome: PROGRESSING AS EXPECTED  Pt is a moderate fall risk. All fall precautions in place.

## 2019-06-21 NOTE — PROGRESS NOTES
Infectious Disease Progress Note    Author: Beatriz Johnson M.D. Date & Time of service: 6/21/2019  3:28 PM    Chief Complaint:  Brain abscess, iliopsoas abscess, leukopenia      Interval History:  68 y.o. female admitted 5/29/2019 as a transfer from [facility] since 4/30/2019. + diabetes, SANTOSH of right hip with hardware, and breast cancer who was originally admitted to [facility] on 03/08/2019 for right hip and pelvic pain.  Work-up revealed a right iliopsoas lesion, gluteal abscess, and mult brain abscesses  6/1/2019-no fevers.  Continues to complain of significant pain in her hips.  Pain medications are being adjusted.  6/2/2019-no fevers.  Continues to remain off the antibiotics.  Awaiting Ortho evaluation  6/4/2019 patient febrile up to 103.  Having shaking chills.  Is not feeling well.  Denies any specific complaints.  6/10 AF WBC 5.3 somnolent No fever or new complaints  6/11 AF WBC 9 No fever or chills-some pain at surgical site. Limited participation with PT noted  6/12 AF WBC 7.3 somnolent but arousable-no new complaints  6/13 afebrile WBC 9.1 patient complaining of left hip pain.  Denies any headaches  6/14 afebrile, no CBC today.  Reports no new issues, tolerating antibiotics.  States the left hip pain is unchanged.  6/18 afebrile, WBC 6.9, HR 80s, -150s, on room air. Repeat CT on 6/17 showed no change in right iliac fluid collection, changes in right iliac/bilateral sacrum and left iliac suspicious of OM.   6/20- no fevers. C/o hip pain.   6/21/2019 no fevers.  Complains of some swelling at the right hip  Labs Reviewed, Medications Reviewed, and Wound Reviewed.    Review of Systems:  Review of Systems   Constitutional: Negative for chills and fever.   Respiratory: Negative for cough and shortness of breath.    Gastrointestinal: Negative for abdominal pain, diarrhea, nausea and vomiting.   Musculoskeletal: Positive for joint pain.        Left hip   Neurological: Negative for dizziness and headaches.    All other systems reviewed and are negative.  Unchanged    Hemodynamics:  Temp (24hrs), Av.8 °C (98.2 °F), Min:36.2 °C (97.2 °F), Max:37.2 °C (99 °F)  Temperature: 36.2 °C (97.2 °F)  Pulse  Av.7  Min: 60  Max: 125  Blood Pressure : 138/63       Physical Exam:  Physical Exam   Constitutional: No distress.   HENT:   Mouth/Throat: Oropharynx is clear and moist. No oropharyngeal exudate.   Eyes: Conjunctivae are normal. No scleral icterus.   Neck: Neck supple.   Cardiovascular: Normal rate, regular rhythm and normal heart sounds.    No murmur heard.  Pulmonary/Chest: Effort normal and breath sounds normal. She has no wheezes. She has no rales.   Abdominal: Soft. She exhibits no distension. There is tenderness. There is no rebound.   Musculoskeletal: She exhibits tenderness. She exhibits no edema.   Right hip incision site has some swelling and induration.  No erythema   Neurological: She is alert.   No gross focal neuro deficit   Skin: Skin is warm. No rash noted. She is not diaphoretic. No erythema.   Psychiatric: She has a normal mood and affect.   Very pleasant   Nursing note and vitals reviewed.  No change compared to     Meds:    Current Facility-Administered Medications:   •  magnesium oxide  •  morphine injection  •  rivaroxaban  •  lactobacillus rhamnosus  •  potassium chloride SA  •  oxyCODONE immediate-release  •  sucralfate  •  vancomycin  •  Pharmacy Consult Request  •  fluconazole  •  MD Alert...Vancomycin per Pharmacy  •  cefepime  •  acetaminophen  •  LORazepam  •  [DISCONTINUED] insulin regular **AND** [CANCELED] Accu-Chek ACHS **AND** NOTIFY MD and PharmD **AND** glucose **AND** dextrose 10% bolus  •  NS  •  pramipexole  •  lidocaine  •  temazepam  •  cyclobenzaprine  •  Respiratory Care per Protocol  •  amLODIPine  •  cinacalcet  •  losartan  •  acetaminophen  •  atorvastatin  •  omeprazole  •  metoprolol  •  senna-docusate **AND** polyethylene glycol/lytes **AND** magnesium hydroxide  **AND** bisacodyl  •  ondansetron  •  ondansetron    Labs:  Recent Labs      06/19/19   0310  06/20/19   0310   WBC  5.9  7.4   RBC  3.88*  4.18*   HEMOGLOBIN  11.2*  11.6*   HEMATOCRIT  34.1*  37.2   MCV  87.9  89.0   MCH  28.9  27.8   RDW  58.8*  59.9*   PLATELETCT  290  296   MPV  9.6  9.4   NEUTSPOLYS  64.90  66.90   LYMPHOCYTES  18.00*  18.00*   MONOCYTES  10.80  10.10   EOSINOPHILS  5.30  3.70   BASOPHILS  0.70  0.80     Recent Labs      06/19/19   0310  06/20/19 0310  06/21/19   0053   SODIUM  132*  131*  131*   POTASSIUM  3.7  4.0  4.5   CHLORIDE  100  98  102   CO2  23  22  22   GLUCOSE  123*  79  118*   BUN  10  10  13     Recent Labs      06/19/19 0310 06/20/19 0310  06/21/19   0053   CREATININE  0.31*  0.39*  0.44*       Imaging:  MRI of the brain on 6/8/2019  Impression       1.  Interval progression of supra and infratentorial intra-axial nodules and associated edema. This short-term interval progression favors infection over neoplasm though both remain considerations.  2.  Mild atrophy         Micro:  Results     Procedure Component Value Units Date/Time    Fungal Smear [[id number]] Collected:  06/19/19 0930    Order Status:  Completed Specimen:  Tissue from Other Body Fluid Updated:  06/21/19 1418     Significant Indicator NEG     Source TISS     Site Right Hip deep bone     Fungal Smear Results No fungal elements seen.    Narrative:       Collected By:[id number] BEATRICE HARRIS  Bone biopsy right hip  Collected By:[id number] BEATRICE HARRIS  Right hip fluid collection  Collected By:[id number] BEATRICE HARRIS    Fungal Culture [[id number]] Collected:  06/19/19 0930    Order Status:  Completed Specimen:  Tissue Updated:  06/21/19 1418     Significant Indicator NEG     Source TISS     Site Right Hip deep bone     Culture Result Culture in progress.    Narrative:       Collected By:[id number] BEATRICE HARRIS  Bone biopsy right hip  Collected By:[id number] BEATRICE HARRIS  Right hip fluid collection  Collected By:[id number] BARORN  WINSTON HARRIS    AFB Culture [[id number]] Collected:  06/19/19 0930    Order Status:  Completed Specimen:  Tissue from Other Body Fluid Updated:  06/21/19 1418     Significant Indicator NEG     Source TISS     Site Right Hip deep bone     Culture Result Culture in progress.     AFB Smear Results No acid fast bacilli seen.    Narrative:       Collected By:[id number] BEATRICE HARRIS  Bone biopsy right hip  Collected By:[id number] BEATRICE HARRIS  Right hip fluid collection  Collected By:[id number] BEATRICE HARRIS    CULTURE TISSUE W/ GRM STAIN [[id number]] Collected:  06/19/19 0930    Order Status:  Completed Specimen:  Tissue Updated:  06/21/19 1418     Significant Indicator NEG     Source TISS     Site Right Hip deep bone     Culture Result No growth at 48 hours.     Gram Stain Result No organisms seen.    Narrative:       Collected By:[id number] BEATRICE HARRIS  Bone biopsy right hip  Collected By:[id number] BEATRICE HARRIS  Right hip fluid collection  Collected By:[id number] BEATRICE HARRIS    GRAM STAIN [[id number]] Collected:  06/19/19 0930    Order Status:  Completed Specimen:  Tissue Updated:  06/19/19 2107     Significant Indicator .     Source TISS     Site Right Hip deep bone     Gram Stain Result No organisms seen.    Narrative:       Collected By:[id number] BEATRICE HARRIS  Bone biopsy right hip  Collected By:[id number] BEATRICE HARRIS  Right hip fluid collection  Collected By:[id number] BEATRICE HARRIS    Acid Fast Stain [[id number]] Collected:  06/19/19 0930    Order Status:  Completed Specimen:  Tissue Updated:  06/19/19 2107     Significant Indicator NEG     Source TISS     Site Right Hip deep bone     AFB Smear Results No acid fast bacilli seen.    Narrative:       Collected By:[id number] BEATRICE HARRIS  Bone biopsy right hip  Collected By:[id number] BEATRICE HARRIS  Right hip fluid collection  Collected By:[id number] BEATRICE HARRIS    FLUID CULTURE W/GRAM STAIN [[id number]] Collected:  06/19/19 0930    Order Status:  Canceled Specimen:  Other from Other Body Fluid     AFB STAIN  "ONLY [[id number]] Collected:  06/19/19 0930    Order Status:  Canceled Specimen:  Other from Other Body Fluid     AFB Culture [[id number]] Collected:  06/19/19 0930    Order Status:  Sent Specimen:  Body Fluid from Other Body Fluid     Fungal Culture [[id number]] Collected:  06/19/19 0930    Order Status:  Sent Specimen:  Bone from Other Tissue     FUNGAL BLOOD CULTURE [[id number]]     Order Status:  No result Specimen:  Blood from Blood     FLUID CULTURE W/GRAM STAIN [[id number]]     Order Status:  No result Specimen:  Body Fluid from Other Body Fluid     Fungal Culture [[id number]]     Order Status:  Canceled Specimen:  Other from Other Tissue     Fungal Smear [[id number]]     Order Status:  Canceled Specimen:  Body Fluid from Other Tissue           Assessment:  67 yo female with:  Gluteal and iliopsoas abscess  Brain abscesses vs mets  Breast cancer  DM2    Plan:  Brain abscesses vs mets.  Persistent with interval progression on last MRI  MRI 4/23 \"supra and infratentorial brain parenchyma. Decrease in the size of the lesions. Interval reduction in the extent of the surrounding white matter edema consistent with improvement/treatment response of the most of the multifocal brain abscesses.  MRI 5/21 showed innumerable microabscesses vs mets  Abx (vancomycin, cefepime and Flagyl) discontinued on 5/23 after ~8 weeks of treatment-developed new fevers on 6/4  MRI on 6/8 with interval progression of supra and infratentorial intra--axial nodules and associated edema.  New right thalamus lesion. Radiology favors infection over neoplasm though both remain considerations (had been off abx)  Neurosurgery evaluation - recommended continuing antibiotics for another 2 weeks and then repeating MRI brain, as the lesions are not amenable to biopsy   Mult blood cultures neg  CHAS neg 3/15 and 5/24  +QuantiGold as above  Continue vanco/cefepime  Fluconazole 800 mg daily was added on 6/9 and is being continued at this time  Monitor renal " "function and Vanco trough  Vanco trough 18.6 6/15  Plan for repeat MRI around 6/21/2019 to assess for improvement  Will need to reassess need for biopsy if persistent or worse, and sent for pathology, regular, AFB, fungal cultures     Latent TB  As CNS lesions previously improved on nontuberculosis treatment, continuing as above  Repeat MRI in 6/8 with interval progression   If progression noted on repeat MRI brain or pelvis CT, will need biopsy with pathology, AFB, fungal, bacterial cultures    Fever of unknown origin.  Resolved  Fever and chills 6/4  Cultures negative  Now afebrile  QuantiGold+. CXR neg    Gluteal and iliopsoas abscess s/p treatment.   Adjacent to hardware in right hip (placed 12/17/18)  CT 4/23 \"Fluid collections within the right gluteal and iliacis muscles.. The collection within the right gluteal muscle has unchanged while the fluid collection within the right iliacis muscle is increased somewhat in size.\" 2.7 cm  Cultures 3/29 and 4/4 neg  MRI on 5/20/2019 - interval decrease in collection in R gluteal muscle and persistent multiloculated collection in R iliacus muscle.   CT 5/28 no change  s/p drainage on 5/30/2019-cultures negative  S/p removal hardware 6/5-no cultures done  Panculture neg  1, 3 beta glucan neg  S/p removal hardware 6/5/19  CT on 6/8 with residual abscess in the right iliacus muscle, 2.5 x 1.8 cm, slightly smaller than prior  Repeat CT 6/17 showed persistent fluid collections. I reviewed CT with radiology, Dr. Cadena and the right iliac fluid collection is amenable for aspiration, however it's high risk for colon injury. For this reason, we elected not to have aspiration of the fluid collections, but rather the bone biopsy of the iliac bone. The path is pending.    Pt did have CT-guided deep bone biopsy  on 6/19. Bone sample was sent for gram stain/routine culture, fungal smear/cx, and AFB smear/cx.  The cultures are negative so far.  Currently on Vanco cefepime and " Diflucan      Type 2 DM  Hemoglobin A1c 6.3   Keep -140s    Discussed with Dr. Barkley, [facility]    ID will follow.  Please call with questions.

## 2019-06-21 NOTE — PROGRESS NOTES
"Pharmacy Kinetics 68 y.o. female on vancomycin day # 18 2019    Currently on Vancomycin 1200 mg iv q24hr (1300)    Indication for Treatment: brain abscess + iliopsoas abscess     Pertinent history per medical record: Admitted on 2019 for pelvic abscess (LTACH transfer) after recent admission at [facility] ([facility] Concerns for brain and iliopsoas abscess. NSG and ID consulted (I&D per IR 19; hardware removal 19, CT guided bone biopsy ).      Other antibiotics: cefepime 2 gm iv q12h, fluconazole 800 mg po qd     Allergies: Patient has no known allergies.      List concerns for accumulation of vancomycin: age 68, low body weight, BUN:SCr ~20:1     Pertinent cultures to date:   19: C.Diff: Negative   19: PBCx2: NGTD   19: Pelvic abscess,BF: NGTD      MRSA nares swab if pneumonia is a concern (ordered/positive/negative/n-a): n/a    Recent Labs      19   0310  19   0310   WBC  5.9  7.4   NEUTSPOLYS  64.90  66.90     Recent Labs      19   0310  19   0310  19   0053   BUN  10  10  13   CREATININE  0.31*  0.39*  0.44*     Recent Labs      19   1228   VANCOTROUGH  20.1*   No intake or output data in the 24 hours ending 19 1407   /63   Pulse 65   Temp 36.2 °C (97.2 °F) (Temporal)   Resp 18   Ht 1.575 m (5' 2\")   Wt 59.2 kg (130 lb 8.2 oz)   SpO2 95%  Temp (24hrs), Av.8 °C (98.2 °F), Min:36.2 °C (97.2 °F), Max:37.2 °C (99 °F)      A/P   1. Vancomycin dose change: none  2. Next vancomycin level:  at 1230; ordered  3. Goal trough: 18-22 mcg/mL  4. Comments: Vancomycin continued with plan for repeat MRI to assess brain abscess progression.  Minimal change in SCr overnight, will need to monitor trend.  Afebrile, no new cultures.  Trough in AM.    Erwin Christiansen, PharmD, BCPS        "

## 2019-06-22 ENCOUNTER — APPOINTMENT (OUTPATIENT)
Dept: RADIOLOGY | Facility: MEDICAL CENTER | Age: 69
DRG: 356 | End: 2019-06-22
Attending: INTERNAL MEDICINE
Payer: MEDICARE

## 2019-06-22 ENCOUNTER — APPOINTMENT (OUTPATIENT)
Dept: RADIOLOGY | Facility: MEDICAL CENTER | Age: 69
DRG: 356 | End: 2019-06-22
Attending: NEUROLOGICAL SURGERY
Payer: MEDICARE

## 2019-06-22 LAB
BACTERIA TISS AEROBE CULT: NORMAL
BASOPHILS # BLD AUTO: 0.9 % (ref 0–1.8)
BASOPHILS # BLD: 0.05 K/UL (ref 0–0.12)
EOSINOPHIL # BLD AUTO: 0.25 K/UL (ref 0–0.51)
EOSINOPHIL NFR BLD: 4.7 % (ref 0–6.9)
ERYTHROCYTE [DISTWIDTH] IN BLOOD BY AUTOMATED COUNT: 57.1 FL (ref 35.9–50)
GRAM STN SPEC: NORMAL
HCT VFR BLD AUTO: 38 % (ref 37–47)
HGB BLD-MCNC: 12.2 G/DL (ref 12–16)
IMM GRANULOCYTES # BLD AUTO: 0.03 K/UL (ref 0–0.11)
IMM GRANULOCYTES NFR BLD AUTO: 0.6 % (ref 0–0.9)
LYMPHOCYTES # BLD AUTO: 0.8 K/UL (ref 1–4.8)
LYMPHOCYTES NFR BLD: 15 % (ref 22–41)
MAGNESIUM SERPL-MCNC: 1.6 MG/DL (ref 1.5–2.5)
MCH RBC QN AUTO: 27.9 PG (ref 27–33)
MCHC RBC AUTO-ENTMCNC: 32.1 G/DL (ref 33.6–35)
MCV RBC AUTO: 87 FL (ref 81.4–97.8)
MONOCYTES # BLD AUTO: 0.56 K/UL (ref 0–0.85)
MONOCYTES NFR BLD AUTO: 10.5 % (ref 0–13.4)
NEUTROPHILS # BLD AUTO: 3.66 K/UL (ref 2–7.15)
NEUTROPHILS NFR BLD: 68.3 % (ref 44–72)
NRBC # BLD AUTO: 0 K/UL
NRBC BLD-RTO: 0 /100 WBC
PLATELET # BLD AUTO: 298 K/UL (ref 164–446)
PMV BLD AUTO: 9.3 FL (ref 9–12.9)
RBC # BLD AUTO: 4.37 M/UL (ref 4.2–5.4)
SIGNIFICANT IND 70042: NORMAL
SITE SITE: NORMAL
SOURCE SOURCE: NORMAL
VANCOMYCIN TROUGH SERPL-MCNC: 28.1 UG/ML (ref 10–20)
WBC # BLD AUTO: 5.4 K/UL (ref 4.8–10.8)

## 2019-06-22 PROCEDURE — 36415 COLL VENOUS BLD VENIPUNCTURE: CPT

## 2019-06-22 PROCEDURE — 700117 HCHG RX CONTRAST REV CODE 255: Performed by: NEUROLOGICAL SURGERY

## 2019-06-22 PROCEDURE — A9270 NON-COVERED ITEM OR SERVICE: HCPCS | Performed by: NURSE PRACTITIONER

## 2019-06-22 PROCEDURE — A9270 NON-COVERED ITEM OR SERVICE: HCPCS | Performed by: INTERNAL MEDICINE

## 2019-06-22 PROCEDURE — 83735 ASSAY OF MAGNESIUM: CPT

## 2019-06-22 PROCEDURE — 700102 HCHG RX REV CODE 250 W/ 637 OVERRIDE(OP): Performed by: HOSPITALIST

## 2019-06-22 PROCEDURE — 700102 HCHG RX REV CODE 250 W/ 637 OVERRIDE(OP): Performed by: INTERNAL MEDICINE

## 2019-06-22 PROCEDURE — 80202 ASSAY OF VANCOMYCIN: CPT

## 2019-06-22 PROCEDURE — A9585 GADOBUTROL INJECTION: HCPCS | Performed by: NEUROLOGICAL SURGERY

## 2019-06-22 PROCEDURE — 700105 HCHG RX REV CODE 258: Performed by: HOSPITALIST

## 2019-06-22 PROCEDURE — 70553 MRI BRAIN STEM W/O & W/DYE: CPT

## 2019-06-22 PROCEDURE — 76882 US LMTD JT/FCL EVL NVASC XTR: CPT | Mod: RT

## 2019-06-22 PROCEDURE — 99232 SBSQ HOSP IP/OBS MODERATE 35: CPT | Performed by: INTERNAL MEDICINE

## 2019-06-22 PROCEDURE — 700111 HCHG RX REV CODE 636 W/ 250 OVERRIDE (IP): Performed by: HOSPITALIST

## 2019-06-22 PROCEDURE — 700101 HCHG RX REV CODE 250: Performed by: NURSE PRACTITIONER

## 2019-06-22 PROCEDURE — A9270 NON-COVERED ITEM OR SERVICE: HCPCS | Performed by: HOSPITALIST

## 2019-06-22 PROCEDURE — 700102 HCHG RX REV CODE 250 W/ 637 OVERRIDE(OP): Performed by: NURSE PRACTITIONER

## 2019-06-22 PROCEDURE — 85025 COMPLETE CBC W/AUTO DIFF WBC: CPT

## 2019-06-22 PROCEDURE — 700105 HCHG RX REV CODE 258: Performed by: INTERNAL MEDICINE

## 2019-06-22 PROCEDURE — 700111 HCHG RX REV CODE 636 W/ 250 OVERRIDE (IP): Performed by: INTERNAL MEDICINE

## 2019-06-22 PROCEDURE — 770006 HCHG ROOM/CARE - MED/SURG/GYN SEMI*

## 2019-06-22 RX ORDER — GADOBUTROL 604.72 MG/ML
6 INJECTION INTRAVENOUS ONCE
Status: COMPLETED | OUTPATIENT
Start: 2019-06-22 | End: 2019-06-22

## 2019-06-22 RX ADMIN — OXYCODONE HYDROCHLORIDE 5 MG: 5 TABLET ORAL at 04:39

## 2019-06-22 RX ADMIN — CEFEPIME 2 G: 2 INJECTION, POWDER, FOR SOLUTION INTRAVENOUS at 04:41

## 2019-06-22 RX ADMIN — Medication 1 CAPSULE: at 09:00

## 2019-06-22 RX ADMIN — POTASSIUM CHLORIDE 40 MEQ: 20 TABLET, EXTENDED RELEASE ORAL at 04:38

## 2019-06-22 RX ADMIN — METOPROLOL TARTRATE 25 MG: 25 TABLET, FILM COATED ORAL at 17:00

## 2019-06-22 RX ADMIN — PRAMIPEXOLE DIHYDROCHLORIDE 1 MG: 0.5 TABLET ORAL at 04:36

## 2019-06-22 RX ADMIN — OXYCODONE HYDROCHLORIDE 5 MG: 5 TABLET ORAL at 13:05

## 2019-06-22 RX ADMIN — PRAMIPEXOLE DIHYDROCHLORIDE 1 MG: 0.5 TABLET ORAL at 17:00

## 2019-06-22 RX ADMIN — SUCRALFATE 1 G: 1 SUSPENSION ORAL at 04:36

## 2019-06-22 RX ADMIN — FLUCONAZOLE 800 MG: 200 TABLET ORAL at 04:36

## 2019-06-22 RX ADMIN — ACETAMINOPHEN 1000 MG: 500 TABLET ORAL at 04:38

## 2019-06-22 RX ADMIN — RIVAROXABAN 20 MG: 20 TABLET, FILM COATED ORAL at 17:00

## 2019-06-22 RX ADMIN — SUCRALFATE 1 G: 1 SUSPENSION ORAL at 13:19

## 2019-06-22 RX ADMIN — ATORVASTATIN CALCIUM 10 MG: 10 TABLET, FILM COATED ORAL at 17:00

## 2019-06-22 RX ADMIN — LORAZEPAM 0.5 MG: 0.5 TABLET ORAL at 10:22

## 2019-06-22 RX ADMIN — SENNOSIDES AND DOCUSATE SODIUM 2 TABLET: 8.6; 5 TABLET ORAL at 04:37

## 2019-06-22 RX ADMIN — CEFEPIME 2 G: 2 INJECTION, POWDER, FOR SOLUTION INTRAVENOUS at 17:00

## 2019-06-22 RX ADMIN — METOPROLOL TARTRATE 25 MG: 25 TABLET, FILM COATED ORAL at 04:38

## 2019-06-22 RX ADMIN — OMEPRAZOLE 20 MG: 20 CAPSULE, DELAYED RELEASE ORAL at 04:39

## 2019-06-22 RX ADMIN — LOSARTAN POTASSIUM 50 MG: 50 TABLET ORAL at 04:38

## 2019-06-22 RX ADMIN — GADOBUTROL 6 ML: 604.72 INJECTION INTRAVENOUS at 11:23

## 2019-06-22 RX ADMIN — SUCRALFATE 1 G: 1 SUSPENSION ORAL at 23:48

## 2019-06-22 RX ADMIN — LIDOCAINE 2 PATCH: 50 PATCH CUTANEOUS at 13:19

## 2019-06-22 RX ADMIN — MAGNESIUM OXIDE TAB 400 MG (241.3 MG ELEMENTAL MG) 400 MG: 400 (241.3 MG) TAB at 04:37

## 2019-06-22 RX ADMIN — SUCRALFATE 1 G: 1 SUSPENSION ORAL at 00:15

## 2019-06-22 RX ADMIN — SENNOSIDES AND DOCUSATE SODIUM 2 TABLET: 8.6; 5 TABLET ORAL at 17:00

## 2019-06-22 RX ADMIN — CYCLOBENZAPRINE 10 MG: 10 TABLET, FILM COATED ORAL at 00:22

## 2019-06-22 RX ADMIN — OXYCODONE HYDROCHLORIDE 5 MG: 5 TABLET ORAL at 09:00

## 2019-06-22 RX ADMIN — AMLODIPINE BESYLATE 10 MG: 5 TABLET ORAL at 04:39

## 2019-06-22 RX ADMIN — POTASSIUM CHLORIDE 40 MEQ: 20 TABLET, EXTENDED RELEASE ORAL at 16:59

## 2019-06-22 RX ADMIN — SUCRALFATE 1 G: 1 SUSPENSION ORAL at 17:00

## 2019-06-22 RX ADMIN — MAGNESIUM OXIDE TAB 400 MG (241.3 MG ELEMENTAL MG) 400 MG: 400 (241.3 MG) TAB at 17:07

## 2019-06-22 RX ADMIN — CINACALCET HYDROCHLORIDE 60 MG: 30 TABLET, COATED ORAL at 04:41

## 2019-06-22 RX ADMIN — VANCOMYCIN HYDROCHLORIDE 1200 MG: 100 INJECTION, POWDER, LYOPHILIZED, FOR SOLUTION INTRAVENOUS at 13:22

## 2019-06-22 RX ADMIN — ACETAMINOPHEN 1000 MG: 500 TABLET ORAL at 17:00

## 2019-06-22 RX ADMIN — OXYCODONE HYDROCHLORIDE 5 MG: 5 TABLET ORAL at 23:51

## 2019-06-22 ASSESSMENT — ENCOUNTER SYMPTOMS
COUGH: 0
CHILLS: 0
FEVER: 0
HEADACHES: 0
DIARRHEA: 0
SHORTNESS OF BREATH: 0
DIZZINESS: 0
VOMITING: 0
ABDOMINAL PAIN: 0
NAUSEA: 0

## 2019-06-22 NOTE — PROGRESS NOTES
Hospital Medicine Daily Progress Note    Date of Service  6/22/2019    Chief Complaint  persistent abscess.    Hospital Course     Ms. Martini is a 68-year-old female who was transferred from [facility] on 5/29/2019 with persistent right gluteal abscesses since sacral fracture repair in December.  She has had multiple IR drainage.  She initially presented with pelvic pain, low back pain, and confusion. She also had new slurred speech. Imaging of the abdomen, pelvis and brain revealed abscesses and she was treated with a full course of IV antibiotics per ID.  Her slurred speech improved and she was able to ambulate with physical therapy. However, repeat imaging showed persistent pelvic abscesses.  MRI brain shows increase in brain lesions.  Her brain lesions are known from prior imaging and there is concern they represent metastasis.  Oncology aware and do not recommend further imaging. Cultures remained negative and a CHAS done by Dr. Potter showed no vegetations. The size of the pelvic abscesses was reviewed by interventional radiology and the case was discussed with Dr. Finley who recommended transfer to [facility] for CT guided drainage. She underwent drainage on 5/30. On 6/4, she became febrile again, so restarted on cefepime and vancomycin per ID and has been on them since.  Right hip hardware was removed on 6/5. Repeat MRI brain on 6/7/19 saw progression of the supra and infratentorial intra-axial nodules with edema, prompting a Neurosurgery consult.  Dr. Nguyen stated lesions are not amendable to biopsy stereotactically, and favors infectious etiology, recommended continued antibiotics, antineuroleptic and repeat brain MRI in 2 weeks (around 6/21).  Repeat CT pelvis on 6/9 showed residual fluid collection 2.5 x 1.8 cm in right iliacus muscle., slightly smaller than before.  Cefepime + vanc were continue. Work up was broadened to include fungal etiology, and she is started on empiric started fluconazole. Repeat CT on 6/17  showed no significant interval change in the size of the right iliacus muscle fluid collections, with changes in the right iliac bone, bilateral sacrum, and left iliac bone suspicious for osteomyelitis, and changes at L5-S1 suspicious for discitis/osteomyelitis, along with hyperdense right gluteal lesion, moderately suspicious for postoperative pseudoaneurysm with adjacent hematoma. IR felt aspiration of the fluid will have low yield, and so recommended biopsy of the nonhealing right iliac fracture area instead. Biopsy of the hip showed no AFB, organism, or fungal elements.      Interval Problem Update  6/22/2019 - I reviewed the patient's chart. There were no significant overnight events. Remains hemodynamically stable and afebrile. Stable on RA.  Labs pending.  Pending MRI of the brain.    > I have personally seen and examined the patient today. Still has pain, but worse when she moves, worse on the left hip.  Sutures from the right hip was removed, and she noted hematoma there.  Not enlarging.  No other complaints.  No nausea or vomiting.  No fevers or chills.  No chest pain or shortness of breath.  No diarrhea.        Consultants/Specialty  Infectious disease  Orthopedic surgery  Neurosurgery, Dr. Nguyen  IR    Code Status  DNR/DNI    Disposition  Monitor on the floors.   Anticipate discharge to SNF once medically cleared. Accepted by [facility]    Review of Systems  ROS     Pertinent positives/negatives as mentioned above.     A complete review of systems was personally done by me. All other systems were negative.       Physical Exam  Temp:  [36.2 °C (97.2 °F)-37.1 °C (98.7 °F)] 36.2 °C (97.2 °F)  Pulse:  [] 73  Resp:  [17-18] 17  BP: (111-138)/(58-71) 129/58  SpO2:  [95 %-99 %] 99 %    Physical Exam   Constitutional: She is oriented to person, place, and time. She appears well-developed and well-nourished. No distress.   HENT:   Head: Normocephalic and atraumatic.   Mouth/Throat: Oropharynx is clear and  moist. No oropharyngeal exudate.   Eyes: Pupils are equal, round, and reactive to light. Conjunctivae are normal. No scleral icterus.   Neck: Normal range of motion. Neck supple.   Cardiovascular: Normal rate and regular rhythm.  Exam reveals no gallop and no friction rub.    No murmur heard.  Pulmonary/Chest: Effort normal and breath sounds normal. No respiratory distress. She has no wheezes. She has no rales. She exhibits no tenderness.   Abdominal: Soft. Bowel sounds are normal. She exhibits no distension. There is no tenderness. There is no rebound and no guarding.   Musculoskeletal: She exhibits tenderness. She exhibits no edema.   Limited range of motion of the left hip more than the right.  Moderate tenderness.  (+) Approximately 3 x 4 cm mobile mass on the right hip underlying the site of previous sutures.  No tenderness.   Lymphadenopathy:     She has no cervical adenopathy.   Neurological: She is alert and oriented to person, place, and time. No cranial nerve deficit.   Skin: Skin is warm and dry. No rash noted. She is not diaphoretic. No erythema. No pallor.   Psychiatric: She has a normal mood and affect. Her behavior is normal. Judgment and thought content normal.   Nursing note and vitals reviewed.            Fluids    Intake/Output Summary (Last 24 hours) at 06/22/19 0719  Last data filed at 06/21/19 1300   Gross per 24 hour   Intake              480 ml   Output                0 ml   Net              480 ml       Laboratory  Recent Labs      06/20/19   0310   WBC  7.4   RBC  4.18*   HEMOGLOBIN  11.6*   HEMATOCRIT  37.2   MCV  89.0   MCH  27.8   MCHC  31.2*   RDW  59.9*   PLATELETCT  296   MPV  9.4     Recent Labs      06/20/19   0310  06/21/19   0053   SODIUM  131*  131*   POTASSIUM  4.0  4.5   CHLORIDE  98  102   CO2  22  22   GLUCOSE  79  118*   BUN  10  13   CREATININE  0.39*  0.44*   CALCIUM  8.2*  8.3*                   Imaging    Assessment/Plan  * Abscess of right iliac muscle and right gluteus  medius muscle- (present on admission)   Assessment & Plan    - Recurrent issue since sacral fracture repair in December 2018. Has had multiple IR drainage and antibiotics. Recent cultures remain unrevealing.    - s/p biopsy of the nonhealing right iliac fracture area. Showed no AFB, organism, or fungal elements.  Cultures negative.  - continue current antibiotics with cefepime, Diflucan, and vancomycin.  ID following, await definite antibiotic recommendations.      Sepsis (HCC)   Assessment & Plan    -This is sepsis (without associated acute organ dysfunction).  Likely from her abscesses.  Blood cultures negative.  -Sepsis has resolved.  -Continue antibiotics with cefepime, Vancomycin, Diflucan as per ID.  Further work-up as above.  -Continue close hemodynamic monitoring.  Watch for fevers.     Pseudoaneurysm following procedure (HCC)- (present on admission)   Assessment & Plan    -Likely postoperative.  No further interventions needed.  Monitor.     Hypomagnesemia- (present on admission)   Assessment & Plan    -Continue BID PO magnesium oxide. Repeat magnesium level.     Hypercalcemia- (present on admission)   Assessment & Plan    -Resolved.     Brain lesion- (present on admission)   Assessment & Plan    -Still pending repeat brain MRI.  If lesions are persistent or larger, will need biopsy.  - continue IV antibiotics per ID.     Acquired circulating anticoagulants (HCC)- (present on admission)   Assessment & Plan    - continue xarelto.     Non-severe protein-calorie malnutrition (HCC)- (present on admission)   Assessment & Plan    -Moderate.  -Continue nutrition support per RD.       History of DVT (deep vein thrombosis)- (present on admission)   Assessment & Plan    -continue xarelto.     Essential hypertension- (present on admission)   Assessment & Plan    -good control.  Continue Lopressor, Cozaar, and Norvasc.  Monitor blood pressure trend closely.     Chronic hyponatremia- (present on admission)   Assessment  & Plan    - remains stable. Continue daily monitoring.      RLS (restless legs syndrome)- (present on admission)   Assessment & Plan    - Continue on pramipexole.      Chronic pain- (present on admission)   Assessment & Plan    - well controlled. Primarily located at left hip.  -Continue as needed oxycodone, Flexeril, and Tylenol.     History of breast cancer- (present on admission)   Assessment & Plan    -S/p left mastectomy and breast implant.  -Needs follow-up as outpatient.     COPD (chronic obstructive pulmonary disease) (HCC)- (present on admission)   Assessment & Plan    - Not on any exacerbation at this time, continue RT protocol.          VTE prophylaxis: xarelto

## 2019-06-22 NOTE — PROGRESS NOTES
0715 Assumed care, bedside report from PATRICK Almaraz. Resting in bed and in no distress. C/O right hip discomfort, noted lump, not new per report and MD aware. Bed in low position, call light w/in reach.

## 2019-06-22 NOTE — PROGRESS NOTES
"   Orthopaedic Progress Note    Interval changes:  Patient doing well   Staples removed from right hip  Cleared for DC to SNF by ortho pending medicine and ID team clearance     ROS - Patient denies any new issues.  Pain well controlled.    /63   Pulse 65   Temp 36.2 °C (97.2 °F) (Temporal)   Resp 18   Ht 1.575 m (5' 2\")   Wt 59.2 kg (130 lb 8.2 oz)   SpO2 95%       Patient seen and examined  No acute distress  Breathing non labored  RRR  Right hip staples removed and seri strips placed with benzoin Patient clearly fires tibialis anterior, EHL, and gastrocnemius/soleus. Sensation is intact to light touch throughout superficial peroneal, deep peroneal, tibial, saphenous, and sural nerve distributions. Strong and palpable 2+ dorsalis pedis and posterior tibial pulses with capillary refill less than 2 seconds. No lower leg tenderness or discomfort.        Recent Labs      06/19/19   0310  06/20/19   0310   WBC  5.9  7.4   RBC  3.88*  4.18*   HEMOGLOBIN  11.2*  11.6*   HEMATOCRIT  34.1*  37.2   MCV  87.9  89.0   MCH  28.9  27.8   MCHC  32.8*  31.2*   RDW  58.8*  59.9*   PLATELETCT  290  296   MPV  9.6  9.4       Active Hospital Problems    Diagnosis   • Sepsis (HCC) [A41.9]     Priority: High   • Abscess of right iliac muscle and right gluteus medius muscle [L02.91]     Priority: High   • Pseudoaneurysm following procedure (HCC) [I97.89, I72.9]     Priority: Medium   • Hypomagnesemia [E83.42]     Priority: Medium   • Hypercalcemia [E83.52]     Priority: Medium   • Brain lesion [G93.9]     Priority: Medium   • Acquired circulating anticoagulants (HCC) [D68.318]     Priority: Low   • Non-severe protein-calorie malnutrition (HCC) [E46]     Priority: Low   • History of DVT (deep vein thrombosis) [Z86.718]     Priority: Low   • Essential hypertension [I10]     Priority: Low   • Chronic hyponatremia [E87.1]     Priority: Low   • History of breast cancer [Z85.3]     Priority: Low   • COPD (chronic obstructive " pulmonary disease) (HCC) [J44.9]     Priority: Low   • Chronic pain [G89.29]     Priority: Low   • RLS (restless legs syndrome) [G25.81]     Priority: Low       Assessment/Plan:  Cleared for DC by ortho pending medicine and ID team clearance  POD#16 S/P Hardware removal, pelvis  Wt bearing status - WBAT  Wound care/Drains - open to air  Future Procedures - none planned   Lovenox: Start 6/6, Duration-until ambulatory > 150'  Sutures/Staples - out    PT/OT-initiated  Antibiotics: maxipime 2g IV BID, vanco 1200 mg IV QD  DVT Prophylaxis- TEDS/SCDs/Foot pumps  Sue-none  Case Coordination for Discharge Planning - Disposition pending abx needs

## 2019-06-22 NOTE — PROGRESS NOTES
Alert, no new complaints. Sotero po's, voiding qs clear urine, no bm this shift. Turns/repositions self in bed. MRI done, US of left hip pending.

## 2019-06-22 NOTE — PROGRESS NOTES
Infectious Disease Progress Note    Author: Beatriz Johnson M.D. Date & Time of service: 6/22/2019  10:35 AM    Chief Complaint:  Brain abscess, iliopsoas abscess, leukopenia      Interval History:  68 y.o. female admitted 5/29/2019 as a transfer from [facility] since 4/30/2019. + diabetes, SANTOSH of right hip with hardware, and breast cancer who was originally admitted to [facility] on 03/08/2019 for right hip and pelvic pain.  Work-up revealed a right iliopsoas lesion, gluteal abscess, and mult brain abscesses  6/1/2019-no fevers.  Continues to complain of significant pain in her hips.  Pain medications are being adjusted.  6/2/2019-no fevers.  Continues to remain off the antibiotics.  Awaiting Ortho evaluation  6/4/2019 patient febrile up to 103.  Having shaking chills.  Is not feeling well.  Denies any specific complaints.  6/10 AF WBC 5.3 somnolent No fever or new complaints  6/11 AF WBC 9 No fever or chills-some pain at surgical site. Limited participation with PT noted  6/12 AF WBC 7.3 somnolent but arousable-no new complaints  6/13 afebrile WBC 9.1 patient complaining of left hip pain.  Denies any headaches  6/14 afebrile, no CBC today.  Reports no new issues, tolerating antibiotics.  States the left hip pain is unchanged.  6/18 afebrile, WBC 6.9, HR 80s, -150s, on room air. Repeat CT on 6/17 showed no change in right iliac fluid collection, changes in right iliac/bilateral sacrum and left iliac suspicious of OM.   6/20- no fevers. C/o hip pain.   6/21/2019 no fevers.  Complains of some swelling at the right hip  6/22/2019-continues to complain of swelling of the right hip.  No fevers.  Labs Reviewed, Medications Reviewed, and Wound Reviewed.    Review of Systems:  Review of Systems   Constitutional: Negative for chills and fever.   Respiratory: Negative for cough and shortness of breath.    Gastrointestinal: Negative for abdominal pain, diarrhea, nausea and vomiting.   Musculoskeletal: Positive for joint  pain.        Left hip   Neurological: Negative for dizziness and headaches.   All other systems reviewed and are negative.  Unchanged    Hemodynamics:  Temp (24hrs), Av.6 °C (97.9 °F), Min:36.2 °C (97.2 °F), Max:37.1 °C (98.7 °F)  Temperature: 36.5 °C (97.7 °F)  Pulse  Av.5  Min: 60  Max: 125  Blood Pressure : 124/64       Physical Exam:  Physical Exam   Constitutional: No distress.   HENT:   Mouth/Throat: Oropharynx is clear and moist. No oropharyngeal exudate.   Eyes: Conjunctivae are normal. No scleral icterus.   Neck: Neck supple.   Cardiovascular: Normal rate, regular rhythm and normal heart sounds.    No murmur heard.  Pulmonary/Chest: Effort normal and breath sounds normal. She has no wheezes. She has no rales.   Abdominal: Soft. She exhibits no distension. There is tenderness. There is no rebound.   Musculoskeletal: She exhibits tenderness. She exhibits no edema.   Right hip incision site has some swelling and induration.  No erythema   Neurological: She is alert.   No gross focal neuro deficit   Skin: Skin is warm. No rash noted. She is not diaphoretic. No erythema.   Psychiatric: She has a normal mood and affect.   Very pleasant   Nursing note and vitals reviewed.  No change compared to     Meds:    Current Facility-Administered Medications:   •  magnesium oxide  •  morphine injection  •  rivaroxaban  •  lactobacillus rhamnosus  •  potassium chloride SA  •  oxyCODONE immediate-release  •  sucralfate  •  vancomycin  •  Pharmacy Consult Request  •  fluconazole  •  MD Alert...Vancomycin per Pharmacy  •  cefepime  •  acetaminophen  •  [DISCONTINUED] insulin regular **AND** [CANCELED] Accu-Chek ACHS **AND** NOTIFY MD and PharmD **AND** glucose **AND** dextrose 10% bolus  •  NS  •  pramipexole  •  lidocaine  •  temazepam  •  cyclobenzaprine  •  Respiratory Care per Protocol  •  amLODIPine  •  cinacalcet  •  losartan  •  acetaminophen  •  atorvastatin  •  omeprazole  •  metoprolol  •  senna-docusate  **AND** polyethylene glycol/lytes **AND** magnesium hydroxide **AND** bisacodyl  •  ondansetron  •  ondansetron    Labs:  Recent Labs      06/20/19 0310   WBC  7.4   RBC  4.18*   HEMOGLOBIN  11.6*   HEMATOCRIT  37.2   MCV  89.0   MCH  27.8   RDW  59.9*   PLATELETCT  296   MPV  9.4   NEUTSPOLYS  66.90   LYMPHOCYTES  18.00*   MONOCYTES  10.10   EOSINOPHILS  3.70   BASOPHILS  0.80     Recent Labs      06/20/19 0310  06/21/19   0053   SODIUM  131*  131*   POTASSIUM  4.0  4.5   CHLORIDE  98  102   CO2  22  22   GLUCOSE  79  118*   BUN  10  13     Recent Labs      06/20/19 0310 06/21/19 0053   CREATININE  0.39*  0.44*       Imaging:  MRI of the brain on 6/8/2019  Impression       1.  Interval progression of supra and infratentorial intra-axial nodules and associated edema. This short-term interval progression favors infection over neoplasm though both remain considerations.  2.  Mild atrophy         Micro:  Results     Procedure Component Value Units Date/Time    Fungal Culture [[id number]] Collected:  06/19/19 0930    Order Status:  Completed Specimen:  Tissue Updated:  06/22/19 0917     Significant Indicator NEG     Source TISS     Site Right Hip deep bone     Culture Result Culture in progress.    Narrative:       Collected By:[id number] BEATRICE HARRIS  Bone biopsy right hip  Collected By:[id number] BEATRICE HARRIS  Right hip fluid collection  Collected By:[id number] BEATRICE HARRIS    AFB Culture [[id number]] Collected:  06/19/19 0930    Order Status:  Completed Specimen:  Tissue from Other Body Fluid Updated:  06/22/19 0917     Significant Indicator NEG     Source TISS     Site Right Hip deep bone     Culture Result Culture in progress.     AFB Smear Results No acid fast bacilli seen.    Narrative:       Collected By:[id number] BEATRICE HARRIS  Bone biopsy right hip  Collected By:[id number] BEATRICE HARRIS  Right hip fluid collection  Collected By:[id number] BEATRICE HARRIS    Fungal Smear [[id number]] Collected:  06/19/19 0930    Order Status:   Completed Specimen:  Tissue from Other Body Fluid Updated:  06/22/19 0917     Significant Indicator NEG     Source TISS     Site Right Hip deep bone     Fungal Smear Results No fungal elements seen.    Narrative:       Collected By:[id number] BEATRICE HARRIS  Bone biopsy right hip  Collected By:[id number] BEATRICE HARRIS  Right hip fluid collection  Collected By:[id number] BEATRICE HARRIS    CULTURE TISSUE W/ GRM STAIN [[id number]] Collected:  06/19/19 0930    Order Status:  Completed Specimen:  Tissue Updated:  06/22/19 0917     Significant Indicator NEG     Source TISS     Site Right Hip deep bone     Culture Result No growth at 72 hours.     Gram Stain Result No organisms seen.    Narrative:       Collected By:[id number] BEATRICE HARRIS  Bone biopsy right hip  Collected By:[id number] BEATRICE HARRIS  Right hip fluid collection  Collected By:[id number] BEATRICE HARRIS    GRAM STAIN [[id number]] Collected:  06/19/19 0930    Order Status:  Completed Specimen:  Tissue Updated:  06/19/19 2107     Significant Indicator .     Source TISS     Site Right Hip deep bone     Gram Stain Result No organisms seen.    Narrative:       Collected By:[id number] BEATRICE HARRIS  Bone biopsy right hip  Collected By:[id number] BEATRICE HARRIS  Right hip fluid collection  Collected By:[id number] BEATRICE HARRIS    Acid Fast Stain [[id number]] Collected:  06/19/19 0930    Order Status:  Completed Specimen:  Tissue Updated:  06/19/19 2107     Significant Indicator NEG     Source TISS     Site Right Hip deep bone     AFB Smear Results No acid fast bacilli seen.    Narrative:       Collected By:[id number] BEATRICE HARRIS  Bone biopsy right hip  Collected By:[id number] BEATRICE HARRIS  Right hip fluid collection  Collected By:[id number] BEATRICE HARRIS    FLUID CULTURE W/GRAM STAIN [[id number]] Collected:  06/19/19 0930    Order Status:  Canceled Specimen:  Other from Other Body Fluid     AFB STAIN ONLY [[id number]] Collected:  06/19/19 0930    Order Status:  Canceled Specimen:  Other from Other Body Fluid     AFB  "Culture [[id number]] Collected:  06/19/19 0930    Order Status:  Sent Specimen:  Body Fluid from Other Body Fluid     Fungal Culture [[id number]] Collected:  06/19/19 0930    Order Status:  Sent Specimen:  Bone from Other Tissue     FUNGAL BLOOD CULTURE [[id number]]     Order Status:  No result Specimen:  Blood from Blood     FLUID CULTURE W/GRAM STAIN [[id number]]     Order Status:  No result Specimen:  Body Fluid from Other Body Fluid     Fungal Culture [[id number]]     Order Status:  Canceled Specimen:  Other from Other Tissue     Fungal Smear [[id number]]     Order Status:  Canceled Specimen:  Body Fluid from Other Tissue           Assessment:  69 yo female with:  Gluteal and iliopsoas abscess  Brain abscesses vs mets  Breast cancer  DM2    Plan:  Brain abscesses vs mets.  Persistent with interval progression on last MRI  MRI 4/23 \"supra and infratentorial brain parenchyma. Decrease in the size of the lesions. Interval reduction in the extent of the surrounding white matter edema consistent with improvement/treatment response of the most of the multifocal brain abscesses.  MRI 5/21 showed innumerable microabscesses vs mets  Abx (vancomycin, cefepime and Flagyl) discontinued on 5/23 after ~8 weeks of treatment-developed new fevers on 6/4  MRI on 6/8 with interval progression of supra and infratentorial intra--axial nodules and associated edema.  New right thalamus lesion. Radiology favors infection over neoplasm though both remain considerations (had been off abx)  Neurosurgery evaluation - recommended continuing antibiotics for another 2 weeks and then repeating MRI brain, as the lesions are not amenable to biopsy   Mult blood cultures neg  CHAS neg 3/15 and 5/24  +QuantiGold as above  Continue vanco/cefepime  Fluconazole 800 mg daily was added on 6/9 and is being continued at this time  Monitor renal function and Vanco trough  Vanco trough 18.6 6/15  Plan for repeat MRI around 6/21/2019 to assess for improvement  Will " "need to reassess need for biopsy if persistent or worse, and sent for pathology, regular, AFB, fungal cultures     Latent TB  As CNS lesions previously improved on nontuberculosis treatment, continuing as above  Repeat MRI in 6/8 with interval progression   If progression noted on repeat MRI brain or pelvis CT, will need biopsy with pathology, AFB, fungal, bacterial cultures    Right hip swelling  Check ultrasound    Fever of unknown origin.  Resolved  Fever and chills 6/4  Cultures negative  Now afebrile  QuantiGold+. CXR neg    Gluteal and iliopsoas abscess s/p treatment.   Adjacent to hardware in right hip (placed 12/17/18)  CT 4/23 \"Fluid collections within the right gluteal and iliacis muscles.. The collection within the right gluteal muscle has unchanged while the fluid collection within the right iliacis muscle is increased somewhat in size.\" 2.7 cm  Cultures 3/29 and 4/4 neg  MRI on 5/20/2019 - interval decrease in collection in R gluteal muscle and persistent multiloculated collection in R iliacus muscle.   CT 5/28 no change  s/p drainage on 5/30/2019-cultures negative  S/p removal hardware 6/5-no cultures done  Panculture neg  1, 3 beta glucan neg  S/p removal hardware 6/5/19  CT on 6/8 with residual abscess in the right iliacus muscle, 2.5 x 1.8 cm, slightly smaller than prior  Repeat CT 6/17 showed persistent fluid collections. I reviewed CT with radiology, Dr. Cadena and the right iliac fluid collection is amenable for aspiration, however it's high risk for colon injury. For this reason, we elected not to have aspiration of the fluid collections, but rather the bone biopsy of the iliac bone. The path is pending.    Pt did have CT-guided deep bone biopsy  on 6/19. Bone sample was sent for gram stain/routine culture, fungal smear/cx, and AFB smear/cx.  The cultures are negative so far.  Currently on Vanco cefepime and Diflucan      Type 2 DM  Hemoglobin A1c 6.3   Keep -140s    Discussed with Dr. " Filippo, [facility]    ID will follow.  Please call with questions.

## 2019-06-22 NOTE — PROGRESS NOTES
· 2 RN skin check complete with PATRICK Palma.   · Devices in place PIV, glasses.  · Skin assessed under devices - Yes.  · Confirmed pressure ulcers found on - NA.  · No new potential pressure ulcers noted.    · Heels are red and blanching.  · The following interventions are in place - pressure redistribution mattress, waffle overlay, pt is able to reposition self.

## 2019-06-22 NOTE — PROGRESS NOTES
"Pharmacy Kinetics 68 y.o. female on vancomycin day # 19 2019    Currently on Vancomycin 1200 mg iv q24hr (1300)     Indication for Treatment: brain abscess + iliopsoas abscess     Pertinent history per medical record: Admitted on 2019 for pelvic abscess (LTACH transfer) after recent admission at [facility] ([place] Concerns for brain and iliopsoas abscess. NSG and ID consulted (I&D per IR 19; hardware removal 19, CT guided bone biopsy ).      Other antibiotics: cefepime 2 gm iv q12h, fluconazole 800 mg po qd     Allergies: Patient has no known allergies.      List concerns for accumulation of vancomycin: age 68, low body weight, BUN:SCr ~20:1     Pertinent cultures to date:   19: C.Diff: Negative   19: PBCx2: NGTD   19: Pelvic abscess,BF: NGTD      MRSA nares swab if pneumonia is a concern (ordered/positive/negative/n-a): n/a    Recent Labs      19   0310  19   1223   WBC  7.4  5.4   NEUTSPOLYS  66.90  68.30     Recent Labs      19   0310  19   0053   BUN  10  13   CREATININE  0.39*  0.44*     Recent Labs      19   1223   VANCOTROUGH  28.1*     Intake/Output Summary (Last 24 hours) at 19 1507  Last data filed at 19 1105   Gross per 24 hour   Intake              360 ml   Output                0 ml   Net              360 ml      /64   Pulse 66   Temp 36.5 °C (97.7 °F) (Temporal)   Resp 16   Ht 1.575 m (5' 2\")   Wt 59.2 kg (130 lb 8.2 oz)   SpO2 94%  Temp (24hrs), Av.6 °C (97.9 °F), Min:36.2 °C (97.2 °F), Max:37.1 °C (98.7 °F)      A/P   1. Vancomycin dose change: yes, reduce to 900mg daily (1300)  2. Next vancomycin level:  at 1200; ordered  3. Goal trough: 18-22 mcg/mL  4. Comments: Trough at steady state resulted supratherapeutic.  NNL for renal indices however appears to be stable.  Afebrile overnight and WBC WNL. Bone biopsy results from  are NGTD at the moment.  Will reduce regimen proportionately and recheck a " trough after 2 doses.    Bari LoraD, BCPS

## 2019-06-22 NOTE — CARE PLAN
Problem: Venous Thromboembolism (VTW)/Deep Vein Thrombosis (DVT) Prevention:  Goal: Patient will participate in Venous Thrombosis (VTE)/Deep Vein Thrombosis (DVT)Prevention Measures  Outcome: PROGRESSING AS EXPECTED  Pt receiving Xarelto.     Problem: Skin Integrity  Goal: Risk for impaired skin integrity will decrease  Outcome: PROGRESSING AS EXPECTED  Pt repositions self frequently. Waffle overlay in place.

## 2019-06-22 NOTE — PROGRESS NOTES
Received handoff report from PATRICK Santana and assumed pt care at 1900.  Pt resting in bed comfortably. Labs reviewed. Patient and RN discussed plan of care and questions were answered. Bed in lowest and locked position and bed alarm in place. Call light and personal belongings within reach.

## 2019-06-23 LAB — MAGNESIUM SERPL-MCNC: 1.2 MG/DL (ref 1.5–2.5)

## 2019-06-23 PROCEDURE — A9270 NON-COVERED ITEM OR SERVICE: HCPCS | Performed by: INTERNAL MEDICINE

## 2019-06-23 PROCEDURE — 700102 HCHG RX REV CODE 250 W/ 637 OVERRIDE(OP): Performed by: INTERNAL MEDICINE

## 2019-06-23 PROCEDURE — 83735 ASSAY OF MAGNESIUM: CPT

## 2019-06-23 PROCEDURE — 700102 HCHG RX REV CODE 250 W/ 637 OVERRIDE(OP): Performed by: NURSE PRACTITIONER

## 2019-06-23 PROCEDURE — A9270 NON-COVERED ITEM OR SERVICE: HCPCS | Performed by: NURSE PRACTITIONER

## 2019-06-23 PROCEDURE — 99233 SBSQ HOSP IP/OBS HIGH 50: CPT | Performed by: INTERNAL MEDICINE

## 2019-06-23 PROCEDURE — 700111 HCHG RX REV CODE 636 W/ 250 OVERRIDE (IP): Performed by: INTERNAL MEDICINE

## 2019-06-23 PROCEDURE — 36415 COLL VENOUS BLD VENIPUNCTURE: CPT

## 2019-06-23 PROCEDURE — 700101 HCHG RX REV CODE 250: Performed by: NURSE PRACTITIONER

## 2019-06-23 PROCEDURE — 700102 HCHG RX REV CODE 250 W/ 637 OVERRIDE(OP): Performed by: HOSPITALIST

## 2019-06-23 PROCEDURE — 700105 HCHG RX REV CODE 258: Performed by: INTERNAL MEDICINE

## 2019-06-23 PROCEDURE — 99232 SBSQ HOSP IP/OBS MODERATE 35: CPT | Performed by: INTERNAL MEDICINE

## 2019-06-23 PROCEDURE — A9270 NON-COVERED ITEM OR SERVICE: HCPCS | Performed by: HOSPITALIST

## 2019-06-23 PROCEDURE — 770006 HCHG ROOM/CARE - MED/SURG/GYN SEMI*

## 2019-06-23 RX ORDER — MAGNESIUM SULFATE HEPTAHYDRATE 40 MG/ML
4 INJECTION, SOLUTION INTRAVENOUS ONCE
Status: COMPLETED | OUTPATIENT
Start: 2019-06-23 | End: 2019-06-23

## 2019-06-23 RX ADMIN — PRAMIPEXOLE DIHYDROCHLORIDE 1 MG: 0.5 TABLET ORAL at 11:17

## 2019-06-23 RX ADMIN — MAGNESIUM SULFATE IN WATER 4 G: 40 INJECTION, SOLUTION INTRAVENOUS at 09:13

## 2019-06-23 RX ADMIN — METOPROLOL TARTRATE 25 MG: 25 TABLET, FILM COATED ORAL at 16:35

## 2019-06-23 RX ADMIN — OXYCODONE HYDROCHLORIDE 5 MG: 5 TABLET ORAL at 16:34

## 2019-06-23 RX ADMIN — MAGNESIUM OXIDE TAB 400 MG (241.3 MG ELEMENTAL MG) 400 MG: 400 (241.3 MG) TAB at 16:35

## 2019-06-23 RX ADMIN — CEFEPIME 2 G: 2 INJECTION, POWDER, FOR SOLUTION INTRAVENOUS at 05:38

## 2019-06-23 RX ADMIN — RIVAROXABAN 20 MG: 20 TABLET, FILM COATED ORAL at 16:35

## 2019-06-23 RX ADMIN — PRAMIPEXOLE DIHYDROCHLORIDE 1 MG: 0.5 TABLET ORAL at 16:35

## 2019-06-23 RX ADMIN — METOPROLOL TARTRATE 25 MG: 25 TABLET, FILM COATED ORAL at 05:28

## 2019-06-23 RX ADMIN — SUCRALFATE 1 G: 1 SUSPENSION ORAL at 16:34

## 2019-06-23 RX ADMIN — POTASSIUM CHLORIDE 40 MEQ: 20 TABLET, EXTENDED RELEASE ORAL at 16:35

## 2019-06-23 RX ADMIN — POTASSIUM CHLORIDE 40 MEQ: 20 TABLET, EXTENDED RELEASE ORAL at 05:28

## 2019-06-23 RX ADMIN — LOSARTAN POTASSIUM 50 MG: 50 TABLET ORAL at 05:28

## 2019-06-23 RX ADMIN — CEFEPIME 2 G: 2 INJECTION, POWDER, FOR SOLUTION INTRAVENOUS at 16:34

## 2019-06-23 RX ADMIN — CINACALCET HYDROCHLORIDE 60 MG: 30 TABLET, COATED ORAL at 05:39

## 2019-06-23 RX ADMIN — VANCOMYCIN HYDROCHLORIDE 900 MG: 100 INJECTION, POWDER, LYOPHILIZED, FOR SOLUTION INTRAVENOUS at 14:00

## 2019-06-23 RX ADMIN — SUCRALFATE 1 G: 1 SUSPENSION ORAL at 05:27

## 2019-06-23 RX ADMIN — ACETAMINOPHEN 1000 MG: 500 TABLET ORAL at 05:28

## 2019-06-23 RX ADMIN — OMEPRAZOLE 20 MG: 20 CAPSULE, DELAYED RELEASE ORAL at 05:28

## 2019-06-23 RX ADMIN — AMLODIPINE BESYLATE 10 MG: 5 TABLET ORAL at 05:28

## 2019-06-23 RX ADMIN — MAGNESIUM OXIDE TAB 400 MG (241.3 MG ELEMENTAL MG) 400 MG: 400 (241.3 MG) TAB at 05:28

## 2019-06-23 RX ADMIN — MORPHINE SULFATE 2 MG: 4 INJECTION INTRAVENOUS at 03:51

## 2019-06-23 RX ADMIN — OXYCODONE HYDROCHLORIDE 5 MG: 5 TABLET ORAL at 11:17

## 2019-06-23 RX ADMIN — OXYCODONE HYDROCHLORIDE 5 MG: 5 TABLET ORAL at 20:50

## 2019-06-23 RX ADMIN — LIDOCAINE 2 PATCH: 50 PATCH CUTANEOUS at 11:18

## 2019-06-23 RX ADMIN — SUCRALFATE 1 G: 1 SUSPENSION ORAL at 11:17

## 2019-06-23 RX ADMIN — PRAMIPEXOLE DIHYDROCHLORIDE 1 MG: 0.5 TABLET ORAL at 05:27

## 2019-06-23 RX ADMIN — Medication 1 CAPSULE: at 08:17

## 2019-06-23 RX ADMIN — ATORVASTATIN CALCIUM 10 MG: 10 TABLET, FILM COATED ORAL at 16:34

## 2019-06-23 RX ADMIN — FLUCONAZOLE 800 MG: 200 TABLET ORAL at 05:27

## 2019-06-23 RX ADMIN — CYCLOBENZAPRINE 10 MG: 10 TABLET, FILM COATED ORAL at 01:25

## 2019-06-23 RX ADMIN — ACETAMINOPHEN 1000 MG: 500 TABLET ORAL at 16:35

## 2019-06-23 ASSESSMENT — ENCOUNTER SYMPTOMS
SHORTNESS OF BREATH: 0
DIARRHEA: 0
COUGH: 0
VOMITING: 0
HEADACHES: 0
ABDOMINAL PAIN: 0
NAUSEA: 0
CHILLS: 0
DIZZINESS: 0
FEVER: 0

## 2019-06-23 NOTE — PROGRESS NOTES
Received care of pt from NOC RN this am. Pt is A+O to person, place, time with some prompts. Attempted to reposition pt. She asked for pillow to placed under her left side, then asks for it to be removed. Educated pt about the importance of turns and repositioning Q 2 hrs, pt is difficult to educate, she is easily distracted, asks for paper towels during education. Reinforcing education throughout the days as necessary. Pt is supine @ this time.

## 2019-06-23 NOTE — PROGRESS NOTES
"Pharmacy Kinetics 68 y.o. female on vancomycin day # 20 2019    Currently on Vancomycin 900 mg iv q12hr (1300)    Indication for Treatment: brain abscess + iliopsoas abscess     Pertinent history per medical record: Admitted on 2019 for pelvic abscess (LTACH transfer) after recent admission at [facility] ([facility]). Concerns for brain and iliopsoas abscess. NSG and ID consulted (I&D per IR 19; hardware removal 19, CT guided bone biopsy ).      Other antibiotics: cefepime 2 gm iv q12h, fluconazole 800 mg po qd     Allergies: Patient has no known allergies.      List concerns for accumulation of vancomycin: age 68, low body weight, BUN:SCr ~20:1     Pertinent cultures to date:   19: C.Diff: Negative   19: PBCx2: NGTD   19: Pelvic abscess,BF: NGTD      MRSA nares swab if pneumonia is a concern (ordered/positive/negative/n-a): n/a    Recent Labs      19   1223   WBC  5.4   NEUTSPOLYS  68.30     Recent Labs      19   0053   BUN  13   CREATININE  0.44*     Recent Labs      19   1223   VANCOTROUGH  28.1*     Intake/Output Summary (Last 24 hours) at 19 0745  Last data filed at 19 0714   Gross per 24 hour   Intake              957 ml   Output              300 ml   Net              657 ml      /70   Pulse 73   Temp 37 °C (98.6 °F) (Temporal)   Resp 18   Ht 1.575 m (5' 2\")   Wt 57.1 kg (125 lb 14.1 oz)   SpO2 93%  Temp (24hrs), Av.9 °C (98.4 °F), Min:36.5 °C (97.7 °F), Max:37.1 °C (98.8 °F)      A/P   1. Vancomycin dose change: none  2. Next vancomycin level:  at 1200; ordered  3. Goal trough: 18-22 mcg/ml  4. Comments: Minimal changes overnight, electrolytes are being replenished this AM.  Will continue new reduced regimen after previous trough was supratherapeutic. Afebrile overnight and WBC WNL.  No changes in culture results.      Erwin Christiansen, PharmD, BCPS        "

## 2019-06-23 NOTE — PROGRESS NOTES
Hospital Medicine Daily Progress Note    Date of Service  6/23/2019    Chief Complaint  persistent abscess.    Hospital Course     Ms. Martini is a 68-year-old female who was transferred from [facility] on 5/29/2019 with persistent right gluteal abscesses since sacral fracture repair in December.  She has had multiple IR drainage.  She initially presented with pelvic pain, low back pain, and confusion. She also had new slurred speech. Imaging of the abdomen, pelvis and brain revealed abscesses and she was treated with a full course of IV antibiotics per ID.  Her slurred speech improved and she was able to ambulate with physical therapy. However, repeat imaging showed persistent pelvic abscesses.  MRI brain shows increase in brain lesions.  Her brain lesions are known from prior imaging and there is concern they represent metastasis.  Oncology aware and do not recommend further imaging. Cultures remained negative and a CHAS done by Dr. Potter showed no vegetations. The size of the pelvic abscesses was reviewed by interventional radiology and the case was discussed with Dr. Finley who recommended transfer to [facility] for CT guided drainage. She underwent drainage on 5/30. On 6/4, she became febrile again, so restarted on cefepime and vancomycin per ID and has been on them since.  Right hip hardware was removed on 6/5. Repeat MRI brain on 6/7/19 saw progression of the supra and infratentorial intra-axial nodules with edema, prompting a Neurosurgery consult.  Dr. Nguyen stated lesions are not amendable to biopsy stereotactically, and favors infectious etiology, recommended continued antibiotics, antineuroleptic and repeat brain MRI in 2 weeks (around 6/21).  Repeat CT pelvis on 6/9 showed residual fluid collection 2.5 x 1.8 cm in right iliacus muscle., slightly smaller than before.  Cefepime + vanc were continue. Work up was broadened to include fungal etiology, and she is started on empiric started fluconazole. Repeat CT on 6/17  showed no significant interval change in the size of the right iliacus muscle fluid collections, with changes in the right iliac bone, bilateral sacrum, and left iliac bone suspicious for osteomyelitis, and changes at L5-S1 suspicious for discitis/osteomyelitis, along with hyperdense right gluteal lesion, moderately suspicious for postoperative pseudoaneurysm with adjacent hematoma. IR felt aspiration of the fluid will have low yield, and so recommended biopsy of the nonhealing right iliac fracture area instead. Biopsy of the hip showed no AFB, organism, or fungal elements.      Interval Problem Update  6/23/2019 - I reviewed the patient's chart today. Uneventful night. VSS. Afebrile. Saturating well on RA.  Magnesium low again at 1.2.  MRI of the brain showed innumerable supra and infratentorial enhancing nodules again noted, with multiple lesions appearing somewhat larger and with increased enhancement.    > I have personally seen and examined the patient today. She is alert and oriented, but she states she feels groggy and sleepy.  Still has pain on the hips.  No focal weakness or numbness.  No nausea vomiting.  No chest pain or shortness of breath.    Consultants/Specialty  Infectious disease  Orthopedic surgery  Neurosurgery, Dr. Nguyen  IR    Code Status  DNR/DNI    Disposition  Monitor on the floors.   Anticipate discharge to SNF once medically cleared. [facility] by [facility]    Review of Systems  ROS     Pertinent positives/negatives as mentioned above.     A complete review of systems was personally done by me. All other systems were negative.     Physical Exam  Temp:  [36.3 °C (97.3 °F)-37.1 °C (98.8 °F)] 36.3 °C (97.3 °F)  Pulse:  [71-80] 71  Resp:  [16-18] 17  BP: (125-138)/(55-70) 130/59  SpO2:  [92 %-96 %] 96 %    Physical Exam   Constitutional: She is oriented to person, place, and time. She appears well-developed and well-nourished. No distress.   HENT:   Head: Normocephalic and atraumatic.    Mouth/Throat: Oropharynx is clear and moist. No oropharyngeal exudate.   Eyes: Pupils are equal, round, and reactive to light. Conjunctivae are normal. No scleral icterus.   Neck: Normal range of motion. Neck supple.   Cardiovascular: Normal rate and regular rhythm.  Exam reveals no gallop and no friction rub.    No murmur heard.  Pulmonary/Chest: Effort normal and breath sounds normal. No respiratory distress. She has no wheezes. She has no rales. She exhibits no tenderness.   Abdominal: Soft. Bowel sounds are normal. She exhibits no distension. There is no tenderness. There is no rebound and no guarding.   Musculoskeletal: She exhibits tenderness. She exhibits no edema.   Limited range of motion of the left hip more than the right.  Moderate tenderness.  (+) Approximately 3 x 4 cm mobile mass on the right hip underlying the site of previous sutures.  No tenderness.   Lymphadenopathy:     She has no cervical adenopathy.   Neurological: She is alert and oriented to person, place, and time. No cranial nerve deficit.   Skin: Skin is warm and dry. No rash noted. She is not diaphoretic. No erythema. No pallor.   Psychiatric: She has a normal mood and affect. Her behavior is normal. Judgment and thought content normal.   Nursing note and vitals reviewed.      I have performed the physical examination today 6/23/2019.  In review of yesterday's note, there are no new changes except as documented above.        Fluids    Intake/Output Summary (Last 24 hours) at 06/23/19 1050  Last data filed at 06/23/19 0915   Gross per 24 hour   Intake             1017 ml   Output              300 ml   Net              717 ml       Laboratory  Recent Labs      06/22/19   1223   WBC  5.4   RBC  4.37   HEMOGLOBIN  12.2   HEMATOCRIT  38.0   MCV  87.0   MCH  27.9   MCHC  32.1*   RDW  57.1*   PLATELETCT  298   MPV  9.3     Recent Labs      06/21/19   0053   SODIUM  131*   POTASSIUM  4.5   CHLORIDE  102   CO2  22   GLUCOSE  118*   BUN  13    CREATININE  0.44*   CALCIUM  8.3*                   Imaging    Assessment/Plan  * Abscess of right iliac muscle and right gluteus medius muscle- (present on admission)   Assessment & Plan    - Recurrent issue since sacral fracture repair in December 2018. Has had multiple IR drainage and antibiotics. Recent cultures remain unrevealing.    - s/p biopsy of the nonhealing right iliac fracture area. Showed no AFB, organism, or fungal elements.  Cultures negative.  - continue current antibiotics with cefepime, Diflucan, and vancomycin.  ID following for antibiotic guidance.     Brain lesion- (present on admission)   Assessment & Plan    -Repeat brain MRI after 2 weeks still shows innumerable supra and infratentorial enhancing nodules again noted, with multiple lesions appearing somewhat larger and with increased enhancement, despite antibiotics.  -I have sent out a call for neurosurgery (6/23/19, 10:48 AM), for possible consideration of stereotactic brain biopsy.  -continue IV antibiotics per ID.     Pseudoaneurysm following procedure (HCC)- (present on admission)   Assessment & Plan    -Likely postoperative.  No further interventions needed.  Monitor.     Sepsis (HCC)   Assessment & Plan    -This is sepsis (without associated acute organ dysfunction).  Likely from her abscesses.  Blood cultures negative.  -Sepsis has resolved.  -Continue antibiotics with cefepime, Vancomycin, Diflucan as per ID.  Further work-up as above.  -Continue close hemodynamic monitoring.  Watch for fevers.     Hypomagnesemia- (present on admission)   Assessment & Plan    -Low again.  I will give her another round of IV magnesium sulfate 4 g.  Continue BID PO magnesium oxide. Repeat magnesium level in AM.     Hypercalcemia- (present on admission)   Assessment & Plan    -Resolved.     Acquired circulating anticoagulants (HCC)- (present on admission)   Assessment & Plan    - continue xarelto.     Non-severe protein-calorie malnutrition (HCC)-  (present on admission)   Assessment & Plan    -Moderate.  -Continue nutrition support per RD.       History of DVT (deep vein thrombosis)- (present on admission)   Assessment & Plan    -continue xarelto.     Essential hypertension- (present on admission)   Assessment & Plan    -good control.  Continue Lopressor, Cozaar, and Norvasc.  Monitor blood pressure trend closely.     Chronic hyponatremia- (present on admission)   Assessment & Plan    - remains stable. Continue daily monitoring.      RLS (restless legs syndrome)- (present on admission)   Assessment & Plan    - Continue on pramipexole.      Chronic pain- (present on admission)   Assessment & Plan    - well controlled. Primarily located at left hip.  -Continue as needed oxycodone, Flexeril, and Tylenol.     History of breast cancer- (present on admission)   Assessment & Plan    -S/p left mastectomy and breast implant.  -Needs follow-up as outpatient.     COPD (chronic obstructive pulmonary disease) (HCC)- (present on admission)   Assessment & Plan    - Not on any exacerbation at this time, continue RT protocol.          VTE prophylaxis: xarelto

## 2019-06-23 NOTE — CARE PLAN
Problem: Knowledge Deficit  Goal: Knowledge of disease process/condition, treatment plan, diagnostic tests, and medications will improve  Outcome: PROGRESSING AS EXPECTED  Discussed plan of care for today w/ pt this am, she is A+O, she verbalizes understanding of her plan of care, no questions this am. Pt is easily distracted, and somewhat difficult to educate, interrupts conversation to talk about her cosmetics bag.  Emotional support given. Reinforcing education as necessary. Discussed pt's care with Hospitalist Dr. Barkley during his rounds.      Problem: Pain Management  Goal: Pain level will decrease to patient's comfort goal  Outcome: PROGRESSING AS EXPECTED  Offered pain medication x 2 this am. Pt declines offer of pain medication. Discussed pain mgmt with hospitalist during his rounds. Assessing every four hrs for pain per protocol; see doc flowsheets

## 2019-06-23 NOTE — PROGRESS NOTES
Infectious Disease Progress Note    Author: Beatriz Johnson M.D. Date & Time of service: 6/23/2019  4:24 PM    Chief Complaint:  Brain abscess, iliopsoas abscess, leukopenia      Interval History:  68 y.o. female admitted 5/29/2019 as a transfer from [facility] since 4/30/2019. + diabetes, SANTOSH of right hip with hardware, and breast cancer who was originally admitted to [facility] on 03/08/2019 for right hip and pelvic pain.  Work-up revealed a right iliopsoas lesion, gluteal abscess, and mult brain abscesses  6/1/2019-no fevers.  Continues to complain of significant pain in her hips.  Pain medications are being adjusted.  6/2/2019-no fevers.  Continues to remain off the antibiotics.  Awaiting Ortho evaluation  6/4/2019 patient febrile up to 103.  Having shaking chills.  Is not feeling well.  Denies any specific complaints.  6/10 AF WBC 5.3 somnolent No fever or new complaints  6/11 AF WBC 9 No fever or chills-some pain at surgical site. Limited participation with PT noted  6/12 AF WBC 7.3 somnolent but arousable-no new complaints  6/13 afebrile WBC 9.1 patient complaining of left hip pain.  Denies any headaches  6/14 afebrile, no CBC today.  Reports no new issues, tolerating antibiotics.  States the left hip pain is unchanged.  6/18 afebrile, WBC 6.9, HR 80s, -150s, on room air. Repeat CT on 6/17 showed no change in right iliac fluid collection, changes in right iliac/bilateral sacrum and left iliac suspicious of OM.   6/20- no fevers. C/o hip pain.   6/21/2019 no fevers.  Complains of some swelling at the right hip  6/22/2019-continues to complain of swelling of the right hip.  No fevers.  6/23/2019-no fevers.  The swelling is improved.  The ultrasound did not show any abscess or fluid collection.  Labs Reviewed, Medications Reviewed, and Wound Reviewed.    Review of Systems:  Review of Systems   Constitutional: Negative for chills and fever.   Respiratory: Negative for cough and shortness of breath.     Gastrointestinal: Negative for abdominal pain, diarrhea, nausea and vomiting.   Musculoskeletal: Positive for joint pain.        Left hip   Neurological: Negative for dizziness and headaches.   All other systems reviewed and are negative.  Unchanged    Hemodynamics:  Temp (24hrs), Av.8 °C (98.3 °F), Min:36.3 °C (97.3 °F), Max:37.1 °C (98.8 °F)  Temperature: 36.3 °C (97.3 °F)  Pulse  Av.2  Min: 60  Max: 125  Blood Pressure : 130/59       Physical Exam:  Physical Exam   Constitutional: No distress.   HENT:   Mouth/Throat: Oropharynx is clear and moist. No oropharyngeal exudate.   Eyes: Conjunctivae are normal. No scleral icterus.   Neck: Neck supple.   Cardiovascular: Normal rate, regular rhythm and normal heart sounds.    No murmur heard.  Pulmonary/Chest: Effort normal and breath sounds normal. She has no wheezes. She has no rales.   Abdominal: Soft. She exhibits no distension. There is tenderness. There is no rebound.   Musculoskeletal: She exhibits tenderness. She exhibits no edema.   Right hip incision site has some swelling and induration.  No erythema   Neurological: She is alert.   No gross focal neuro deficit   Skin: Skin is warm. No rash noted. She is not diaphoretic. No erythema.   Psychiatric: She has a normal mood and affect.   Very pleasant   Nursing note and vitals reviewed.  No change compared to     Meds:    Current Facility-Administered Medications:   •  vancomycin  •  magnesium oxide  •  morphine injection  •  rivaroxaban  •  lactobacillus rhamnosus  •  potassium chloride SA  •  oxyCODONE immediate-release  •  sucralfate  •  Pharmacy Consult Request  •  fluconazole  •  MD Alert...Vancomycin per Pharmacy  •  cefepime  •  acetaminophen  •  [DISCONTINUED] insulin regular **AND** [CANCELED] Accu-Chek ACHS **AND** NOTIFY MD and PharmD **AND** glucose **AND** dextrose 10% bolus  •  NS  •  pramipexole  •  lidocaine  •  temazepam  •  cyclobenzaprine  •  Respiratory Care per Protocol  •   amLODIPine  •  cinacalcet  •  losartan  •  acetaminophen  •  atorvastatin  •  omeprazole  •  metoprolol  •  senna-docusate **AND** polyethylene glycol/lytes **AND** magnesium hydroxide **AND** bisacodyl  •  ondansetron  •  ondansetron    Labs:  Recent Labs      06/22/19   1223   WBC  5.4   RBC  4.37   HEMOGLOBIN  12.2   HEMATOCRIT  38.0   MCV  87.0   MCH  27.9   RDW  57.1*   PLATELETCT  298   MPV  9.3   NEUTSPOLYS  68.30   LYMPHOCYTES  15.00*   MONOCYTES  10.50   EOSINOPHILS  4.70   BASOPHILS  0.90     Recent Labs      06/21/19   0053   SODIUM  131*   POTASSIUM  4.5   CHLORIDE  102   CO2  22   GLUCOSE  118*   BUN  13     Recent Labs      06/21/19   0053   CREATININE  0.44*       Imaging:  MRI of the brain on 6/8/2019  Impression       1.  Interval progression of supra and infratentorial intra-axial nodules and associated edema. This short-term interval progression favors infection over neoplasm though both remain considerations.  2.  Mild atrophy         Micro:  Results     Procedure Component Value Units Date/Time    Fungal Culture [[id number]] Collected:  06/19/19 0930    Order Status:  Completed Specimen:  Tissue Updated:  06/22/19 0917     Significant Indicator NEG     Source TISS     Site Right Hip deep bone     Culture Result Culture in progress.    Narrative:       Collected By:[id number] BEATRICE HARRIS  Bone biopsy right hip  Collected By:[id number] BEATRICE HARRIS  Right hip fluid collection  Collected By:[id number] BEATRICE HARRIS    AFB Culture [[id number]] Collected:  06/19/19 0930    Order Status:  Completed Specimen:  Tissue from Other Body Fluid Updated:  06/22/19 0917     Significant Indicator NEG     Source TISS     Site Right Hip deep bone     Culture Result Culture in progress.     AFB Smear Results No acid fast bacilli seen.    Narrative:       Collected By:[id number] BEATRICE HARRIS  Bone biopsy right hip  Collected By:[id number] BEATRICE HARRIS  Right hip fluid collection  Collected By:[id number] BEATRICE HARRIS    Fungal Smear  [[id number]] Collected:  06/19/19 0930    Order Status:  Completed Specimen:  Tissue from Other Body Fluid Updated:  06/22/19 0917     Significant Indicator NEG     Source TISS     Site Right Hip deep bone     Fungal Smear Results No fungal elements seen.    Narrative:       Collected By:[id number] BEATRICE HARRIS  Bone biopsy right hip  Collected By:[id number] BEATRICE HARRIS  Right hip fluid collection  Collected By:[id number] BEATRICE HARRSI    CULTURE TISSUE W/ GRM STAIN [[id number]] Collected:  06/19/19 0930    Order Status:  Completed Specimen:  Tissue Updated:  06/22/19 0917     Significant Indicator NEG     Source TISS     Site Right Hip deep bone     Culture Result No growth at 72 hours.     Gram Stain Result No organisms seen.    Narrative:       Collected By:[id number] BEATRICE HARRIS  Bone biopsy right hip  Collected By:[id number] BEATRICE HARRIS  Right hip fluid collection  Collected By:[id number] BEATRICE HARRIS    GRAM STAIN [[id number]] Collected:  06/19/19 0930    Order Status:  Completed Specimen:  Tissue Updated:  06/19/19 2107     Significant Indicator .     Source TISS     Site Right Hip deep bone     Gram Stain Result No organisms seen.    Narrative:       Collected By:[id number] BEATRICE HARRIS  Bone biopsy right hip  Collected By:[id number] BEATRICE HARRIS  Right hip fluid collection  Collected By:[id number] BEATRICE HARRIS    Acid Fast Stain [[id number]] Collected:  06/19/19 0930    Order Status:  Completed Specimen:  Tissue Updated:  06/19/19 2107     Significant Indicator NEG     Source TISS     Site Right Hip deep bone     AFB Smear Results No acid fast bacilli seen.    Narrative:       Collected By:[id number] BEATRICE HARRIS  Bone biopsy right hip  Collected By:[id number] BEATRICE HARRIS  Right hip fluid collection  Collected By:[id number] BEATRICE HARRIS    FLUID CULTURE W/GRAM STAIN [[id number]] Collected:  06/19/19 0930    Order Status:  Canceled Specimen:  Other from Other Body Fluid     AFB STAIN ONLY [[id number]] Collected:  06/19/19 0930    Order Status:   "Canceled Specimen:  Other from Other Body Fluid     AFB Culture [[id number]] Collected:  06/19/19 0930    Order Status:  Sent Specimen:  Body Fluid from Other Body Fluid     Fungal Culture [[id number]] Collected:  06/19/19 0930    Order Status:  Sent Specimen:  Bone from Other Tissue     FUNGAL BLOOD CULTURE [[id number]]     Order Status:  No result Specimen:  Blood from Blood     FLUID CULTURE W/GRAM STAIN [[id number]]     Order Status:  No result Specimen:  Body Fluid from Other Body Fluid     Fungal Culture [[id number]]     Order Status:  Canceled Specimen:  Other from Other Tissue     Fungal Smear [[id number]]     Order Status:  Canceled Specimen:  Body Fluid from Other Tissue           Assessment:  67 yo female with:  Gluteal and iliopsoas abscess  Brain abscesses vs mets  Breast cancer  DM2    Plan:  Brain abscesses vs mets.  Persistent with interval progression on last MRI  MRI 4/23 \"supra and infratentorial brain parenchyma. Decrease in the size of the lesions. Interval reduction in the extent of the surrounding white matter edema consistent with improvement/treatment response of the most of the multifocal brain abscesses.  MRI 5/21 showed innumerable microabscesses vs mets  Abx (vancomycin, cefepime and Flagyl) discontinued on 5/23 after ~8 weeks of treatment-developed new fevers on 6/4  MRI on 6/8 with interval progression of supra and infratentorial intra--axial nodules and associated edema.  New right thalamus lesion. Radiology favors infection over neoplasm though both remain considerations (had been off abx)  Neurosurgery evaluation - recommended continuing antibiotics for another 2 weeks and then repeating MRI brain, as the lesions are not amenable to biopsy   Mult blood cultures neg  CHAS neg 3/15 and 5/24  +QuantiGold as above  Continue vanco/cefepime  Fluconazole 800 mg daily was added on 6/9 and is being continued at this time  Monitor renal function and Vanco trough  Vanco trough 18.6 6/15  Plan for " "repeat MRI around 6/21/2019 to assess for improvement  Will need to reassess need for biopsy if persistent or worse, and sent for pathology, regular, AFB, fungal cultures     Latent TB  As CNS lesions previously improved on nontuberculosis treatment, continuing as above  Repeat MRI in 6/8 with interval progression   If progression noted on repeat MRI brain or pelvis CT, will need biopsy with pathology, AFB, fungal, bacterial cultures    Right hip swelling  Check ultrasound    Fever of unknown origin.  Resolved  Fever and chills 6/4  Cultures negative  Now afebrile  QuantiGold+. CXR neg    Gluteal and iliopsoas abscess s/p treatment.   Adjacent to hardware in right hip (placed 12/17/18)  CT 4/23 \"Fluid collections within the right gluteal and iliacis muscles.. The collection within the right gluteal muscle has unchanged while the fluid collection within the right iliacis muscle is increased somewhat in size.\" 2.7 cm  Cultures 3/29 and 4/4 neg  MRI on 5/20/2019 - interval decrease in collection in R gluteal muscle and persistent multiloculated collection in R iliacus muscle.   CT 5/28 no change  s/p drainage on 5/30/2019-cultures negative  S/p removal hardware 6/5-no cultures done  Panculture neg  1, 3 beta glucan neg  S/p removal hardware 6/5/19  CT on 6/8 with residual abscess in the right iliacus muscle, 2.5 x 1.8 cm, slightly smaller than prior  Repeat CT 6/17 showed persistent fluid collections. I reviewed CT with radiology, Dr. Cadena and the right iliac fluid collection is amenable for aspiration, however it's high risk for colon injury. For this reason, we elected not to have aspiration of the fluid collections, but rather the bone biopsy of the iliac bone. The path is pending.    Pt did have CT-guided deep bone biopsy  on 6/19. Bone sample was sent for gram stain/routine culture, fungal smear/cx, and AFB smear/cx.  The cultures are negative so far.  Currently on Vanco cefepime and Diflucan      Type 2 " DM  Hemoglobin A1c 6.3   Keep -140s    Discussed with Dr. Barkley, [facility]    ID will follow.  Please call with questions.

## 2019-06-23 NOTE — PROGRESS NOTES
Pt and CNA in room seperately to reposition pt. One will reposition pt and then she will ask the other to remove pillow. This RN reinforces education concerning Q 2 hr turns and repositioning. Pt also encourage to move self in bed. Pt verbalizes understanding of this education, but will continue to need reinforcement.

## 2019-06-23 NOTE — CARE PLAN
Problem: Discharge Barriers/Planning  Goal: Patient's continuum of care needs will be met    Intervention: Collaborate with Transitional Care Team and Interdisciplinary Team to meet discharge needs  Possible discharge to [facility] when medically clear.      Problem: Skin Integrity  Goal: Risk for impaired skin integrity will decrease    Intervention: Assess risk factors for impaired skin integrity and/or pressure ulcers  Encouraged to turn self side to side, need reinforcement due to non compliance secondary to right hip pain.

## 2019-06-23 NOTE — PROGRESS NOTES
"Received alert and oriented x 3. Forgetfull. Check vitals sign and recorded accordingly and due med given per MAR. Monitor sign and symptoms of respiratory distress and treatment given accordingly per MAR.Medicated per MAR and reassessed every 2 hours per protocol. Call light within reach. Bed alarm in placed. Needs attended. Will continue to monitor./55   Pulse 71   Temp 37.1 °C (98.8 °F) (Temporal)   Resp 18   Ht 1.575 m (5' 2\")   Wt 59.2 kg (130 lb 8.2 oz)   SpO2 92%   Breastfeeding? No   BMI 23.87 kg/m² .  "

## 2019-06-24 LAB — MAGNESIUM SERPL-MCNC: 1.7 MG/DL (ref 1.5–2.5)

## 2019-06-24 PROCEDURE — 700111 HCHG RX REV CODE 636 W/ 250 OVERRIDE (IP): Performed by: INTERNAL MEDICINE

## 2019-06-24 PROCEDURE — 36415 COLL VENOUS BLD VENIPUNCTURE: CPT

## 2019-06-24 PROCEDURE — 700102 HCHG RX REV CODE 250 W/ 637 OVERRIDE(OP): Performed by: INTERNAL MEDICINE

## 2019-06-24 PROCEDURE — 83735 ASSAY OF MAGNESIUM: CPT

## 2019-06-24 PROCEDURE — 700102 HCHG RX REV CODE 250 W/ 637 OVERRIDE(OP): Performed by: HOSPITALIST

## 2019-06-24 PROCEDURE — A9270 NON-COVERED ITEM OR SERVICE: HCPCS | Performed by: INTERNAL MEDICINE

## 2019-06-24 PROCEDURE — 700102 HCHG RX REV CODE 250 W/ 637 OVERRIDE(OP): Performed by: NURSE PRACTITIONER

## 2019-06-24 PROCEDURE — A9270 NON-COVERED ITEM OR SERVICE: HCPCS | Performed by: HOSPITALIST

## 2019-06-24 PROCEDURE — 700101 HCHG RX REV CODE 250: Performed by: NURSE PRACTITIONER

## 2019-06-24 PROCEDURE — 99232 SBSQ HOSP IP/OBS MODERATE 35: CPT | Performed by: INTERNAL MEDICINE

## 2019-06-24 PROCEDURE — A9270 NON-COVERED ITEM OR SERVICE: HCPCS | Performed by: NURSE PRACTITIONER

## 2019-06-24 PROCEDURE — 770006 HCHG ROOM/CARE - MED/SURG/GYN SEMI*

## 2019-06-24 PROCEDURE — 700105 HCHG RX REV CODE 258: Performed by: INTERNAL MEDICINE

## 2019-06-24 RX ORDER — LORAZEPAM 1 MG/1
1 TABLET ORAL
Status: DISCONTINUED | OUTPATIENT
Start: 2019-06-24 | End: 2019-06-29

## 2019-06-24 RX ADMIN — AMLODIPINE BESYLATE 10 MG: 5 TABLET ORAL at 05:02

## 2019-06-24 RX ADMIN — CEFEPIME 2 G: 2 INJECTION, POWDER, FOR SOLUTION INTRAVENOUS at 05:01

## 2019-06-24 RX ADMIN — LOSARTAN POTASSIUM 50 MG: 50 TABLET ORAL at 05:02

## 2019-06-24 RX ADMIN — OXYCODONE HYDROCHLORIDE 10 MG: 5 TABLET ORAL at 07:29

## 2019-06-24 RX ADMIN — LORAZEPAM 1 MG: 1 TABLET ORAL at 22:13

## 2019-06-24 RX ADMIN — PRAMIPEXOLE DIHYDROCHLORIDE 1 MG: 0.5 TABLET ORAL at 11:35

## 2019-06-24 RX ADMIN — SUCRALFATE 1 G: 1 SUSPENSION ORAL at 11:35

## 2019-06-24 RX ADMIN — ATORVASTATIN CALCIUM 10 MG: 10 TABLET, FILM COATED ORAL at 17:17

## 2019-06-24 RX ADMIN — PRAMIPEXOLE DIHYDROCHLORIDE 1 MG: 0.5 TABLET ORAL at 17:17

## 2019-06-24 RX ADMIN — METOPROLOL TARTRATE 25 MG: 25 TABLET, FILM COATED ORAL at 05:03

## 2019-06-24 RX ADMIN — POTASSIUM CHLORIDE 40 MEQ: 20 TABLET, EXTENDED RELEASE ORAL at 05:02

## 2019-06-24 RX ADMIN — MAGNESIUM OXIDE TAB 400 MG (241.3 MG ELEMENTAL MG) 400 MG: 400 (241.3 MG) TAB at 17:17

## 2019-06-24 RX ADMIN — OXYCODONE HYDROCHLORIDE 5 MG: 5 TABLET ORAL at 13:54

## 2019-06-24 RX ADMIN — OXYCODONE HYDROCHLORIDE 10 MG: 5 TABLET ORAL at 02:25

## 2019-06-24 RX ADMIN — MAGNESIUM OXIDE TAB 400 MG (241.3 MG ELEMENTAL MG) 400 MG: 400 (241.3 MG) TAB at 05:03

## 2019-06-24 RX ADMIN — Medication 1 CAPSULE: at 07:29

## 2019-06-24 RX ADMIN — FLUCONAZOLE 800 MG: 200 TABLET ORAL at 05:03

## 2019-06-24 RX ADMIN — SUCRALFATE 1 G: 1 SUSPENSION ORAL at 17:17

## 2019-06-24 RX ADMIN — CYCLOBENZAPRINE 10 MG: 10 TABLET, FILM COATED ORAL at 15:13

## 2019-06-24 RX ADMIN — MORPHINE SULFATE 2 MG: 4 INJECTION INTRAVENOUS at 03:43

## 2019-06-24 RX ADMIN — ACETAMINOPHEN 1000 MG: 500 TABLET ORAL at 17:17

## 2019-06-24 RX ADMIN — ACETAMINOPHEN 1000 MG: 500 TABLET ORAL at 05:02

## 2019-06-24 RX ADMIN — PRAMIPEXOLE DIHYDROCHLORIDE 1 MG: 0.5 TABLET ORAL at 05:04

## 2019-06-24 RX ADMIN — OXYCODONE HYDROCHLORIDE 10 MG: 5 TABLET ORAL at 18:01

## 2019-06-24 RX ADMIN — OMEPRAZOLE 20 MG: 20 CAPSULE, DELAYED RELEASE ORAL at 05:03

## 2019-06-24 RX ADMIN — METOPROLOL TARTRATE 25 MG: 25 TABLET, FILM COATED ORAL at 17:17

## 2019-06-24 RX ADMIN — POTASSIUM CHLORIDE 40 MEQ: 20 TABLET, EXTENDED RELEASE ORAL at 17:17

## 2019-06-24 RX ADMIN — CINACALCET HYDROCHLORIDE 60 MG: 30 TABLET, COATED ORAL at 05:03

## 2019-06-24 RX ADMIN — LIDOCAINE 2 PATCH: 50 PATCH CUTANEOUS at 12:21

## 2019-06-24 RX ADMIN — SUCRALFATE 1 G: 1 SUSPENSION ORAL at 05:04

## 2019-06-24 ASSESSMENT — ENCOUNTER SYMPTOMS
FEVER: 0
COUGH: 0
DIZZINESS: 0
DIARRHEA: 0
ABDOMINAL PAIN: 0
SHORTNESS OF BREATH: 0
HEADACHES: 0
VOMITING: 0
NAUSEA: 0
CHILLS: 0

## 2019-06-24 NOTE — CARE PLAN
Problem: Discharge Barriers/Planning  Goal: Patient's continuum of care needs will be met    Intervention: Collaborate with Transitional Care Team and Interdisciplinary Team to meet discharge needs  Possible discharge to [facility] when medically cleared, pt verbalized understanding.      Problem: Skin Integrity  Goal: Risk for impaired skin integrity will decrease    Intervention: Assess and monitor skin integrity, appearance and/or temperature  Encouraged to turn self to side to side or left to back as much as possible to prevent pressure ulcer on her sacral area and kept skin dry and clean from stress incontinence, pt verbalized understanding but needs reinforcement.

## 2019-06-24 NOTE — PROGRESS NOTES
Infectious Disease Progress Note    Author: Beatriz Johnson M.D. Date & Time of service: 6/24/2019  12:09 PM    Chief Complaint:  Brain abscess, iliopsoas abscess, leukopenia      Interval History:  68 y.o. female admitted 5/29/2019 as a transfer from [facility] since 4/30/2019. + diabetes, SANTOSH of right hip with hardware, and breast cancer who was originally admitted to [facility] on 03/08/2019 for right hip and pelvic pain.  Work-up revealed a right iliopsoas lesion, gluteal abscess, and mult brain abscesses  6/1/2019-no fevers.  Continues to complain of significant pain in her hips.  Pain medications are being adjusted.  6/2/2019-no fevers.  Continues to remain off the antibiotics.  Awaiting Ortho evaluation  6/4/2019 patient febrile up to 103.  Having shaking chills.  Is not feeling well.  Denies any specific complaints.  6/10 AF WBC 5.3 somnolent No fever or new complaints  6/11 AF WBC 9 No fever or chills-some pain at surgical site. Limited participation with PT noted  6/12 AF WBC 7.3 somnolent but arousable-no new complaints  6/13 afebrile WBC 9.1 patient complaining of left hip pain.  Denies any headaches  6/14 afebrile, no CBC today.  Reports no new issues, tolerating antibiotics.  States the left hip pain is unchanged.  6/18 afebrile, WBC 6.9, HR 80s, -150s, on room air. Repeat CT on 6/17 showed no change in right iliac fluid collection, changes in right iliac/bilateral sacrum and left iliac suspicious of OM.   6/20- no fevers. C/o hip pain.   6/21/2019 no fevers.  Complains of some swelling at the right hip  6/22/2019-continues to complain of swelling of the right hip.  No fevers.  6/23/2019-no fevers.  The swelling is improved.  The ultrasound did not show any abscess or fluid collection.  6/24/2019 no fevers.  The hip pain is better.  No cough no shortness of breath  Labs Reviewed, Medications Reviewed, and Wound Reviewed.    Review of Systems:  Review of Systems   Constitutional: Negative for  chills and fever.   Respiratory: Negative for cough and shortness of breath.    Gastrointestinal: Negative for abdominal pain, diarrhea, nausea and vomiting.   Musculoskeletal: Positive for joint pain.        Left hip   Neurological: Negative for dizziness and headaches.   All other systems reviewed and are negative.  Unchanged    Hemodynamics:  Temp (24hrs), Av.8 °C (98.2 °F), Min:36.1 °C (97 °F), Max:37.4 °C (99.3 °F)  Temperature: 36.7 °C (98 °F)  Pulse  Av.2  Min: 60  Max: 125  Blood Pressure : 127/64       Physical Exam:  Physical Exam   Constitutional: No distress.   HENT:   Mouth/Throat: Oropharynx is clear and moist. No oropharyngeal exudate.   Eyes: Conjunctivae are normal. No scleral icterus.   Neck: Neck supple.   Cardiovascular: Normal rate, regular rhythm and normal heart sounds.    No murmur heard.  Pulmonary/Chest: Effort normal and breath sounds normal. She has no wheezes. She has no rales.   Abdominal: Soft. She exhibits no distension. There is tenderness. There is no rebound.   Musculoskeletal: She exhibits tenderness. She exhibits no edema.   Right hip incision site has some swelling and induration.  No erythema   Neurological: She is alert.   No gross focal neuro deficit   Skin: Skin is warm. No rash noted. She is not diaphoretic. No erythema.   Psychiatric: She has a normal mood and affect.   Very pleasant   Nursing note and vitals reviewed.  No change compared to     Meds:    Current Facility-Administered Medications:   •  magnesium oxide  •  morphine injection  •  lactobacillus rhamnosus  •  potassium chloride SA  •  oxyCODONE immediate-release  •  sucralfate  •  Pharmacy Consult Request  •  acetaminophen  •  [DISCONTINUED] insulin regular **AND** [CANCELED] Accu-Chek ACHS **AND** NOTIFY MD and PharmD **AND** glucose **AND** dextrose 10% bolus  •  NS  •  pramipexole  •  lidocaine  •  temazepam  •  cyclobenzaprine  •  Respiratory Care per Protocol  •  amLODIPine  •  cinacalcet  •   losartan  •  acetaminophen  •  atorvastatin  •  omeprazole  •  metoprolol  •  senna-docusate **AND** polyethylene glycol/lytes **AND** magnesium hydroxide **AND** bisacodyl  •  ondansetron  •  ondansetron    Labs:  Recent Labs      06/22/19   1223   WBC  5.4   RBC  4.37   HEMOGLOBIN  12.2   HEMATOCRIT  38.0   MCV  87.0   MCH  27.9   RDW  57.1*   PLATELETCT  298   MPV  9.3   NEUTSPOLYS  68.30   LYMPHOCYTES  15.00*   MONOCYTES  10.50   EOSINOPHILS  4.70   BASOPHILS  0.90     No results for input(s): SODIUM, POTASSIUM, CHLORIDE, CO2, GLUCOSE, BUN, CPKTOTAL in the last 72 hours.  No results for input(s): ALBUMIN, TBILIRUBIN, ALKPHOSPHAT, TOTPROTEIN, ALTSGPT, ASTSGOT, CREATININE in the last 72 hours.    Imaging:  MRI of the brain on 6/8/2019  Impression       1.  Interval progression of supra and infratentorial intra-axial nodules and associated edema. This short-term interval progression favors infection over neoplasm though both remain considerations.  2.  Mild atrophy         Micro:  Results     Procedure Component Value Units Date/Time    Fungal Culture [[id number]] Collected:  06/19/19 0930    Order Status:  Completed Specimen:  Tissue Updated:  06/22/19 0917     Significant Indicator NEG     Source TISS     Site Right Hip deep bone     Culture Result Culture in progress.    Narrative:       Collected By:[id number] HARRIS  Bone biopsy right hip  Collected By:[id number] BEATRICE HARRIS  Right hip fluid collection  Collected By:[id number] BEATRICE HARRIS    AFB Culture [[id number]] Collected:  06/19/19 0930    Order Status:  Completed Specimen:  Tissue from Other Body Fluid Updated:  06/22/19 0917     Significant Indicator NEG     Source TISS     Site Right Hip deep bone     Culture Result Culture in progress.     AFB Smear Results No acid fast bacilli seen.    Narrative:       Collected By:[id number] HARRIS  Bone biopsy right hip  Collected By:[id number] HARRIS  Right hip fluid collection  Collected By:[id number] MONTERO  STEVEN    Fungal Smear [[id number]] Collected:  06/19/19 0930    Order Status:  Completed Specimen:  Tissue from Other Body Fluid Updated:  06/22/19 0917     Significant Indicator NEG     Source TISS     Site Right Hip deep bone     Fungal Smear Results No fungal elements seen.    Narrative:       Collected By:[id number] BEATRICE HARRIS  Bone biopsy right hip  Collected By:[id number] BEATRICE HRARIS  Right hip fluid collection  Collected By:[id number] BEATRICE HARRIS    CULTURE TISSUE W/ GRM STAIN [[id number]] Collected:  06/19/19 0930    Order Status:  Completed Specimen:  Tissue Updated:  06/22/19 0917     Significant Indicator NEG     Source TISS     Site Right Hip deep bone     Culture Result No growth at 72 hours.     Gram Stain Result No organisms seen.    Narrative:       Collected By:[id number] BEATRICE HARRIS  Bone biopsy right hip  Collected By:[id number] BEATRICE HARRIS  Right hip fluid collection  Collected By:[id number] BEATRICE HARRIS    GRAM STAIN [[id number]] Collected:  06/19/19 0930    Order Status:  Completed Specimen:  Tissue Updated:  06/19/19 2107     Significant Indicator .     Source TISS     Site Right Hip deep bone     Gram Stain Result No organisms seen.    Narrative:       Collected By:[id number] BEATRICE HARRIS  Bone biopsy right hip  Collected By:[id number] BEATRICE HARRIS  Right hip fluid collection  Collected By:[id number] BEATRICE HARRIS    Acid Fast Stain [[id number]] Collected:  06/19/19 0930    Order Status:  Completed Specimen:  Tissue Updated:  06/19/19 2107     Significant Indicator NEG     Source TISS     Site Right Hip deep bone     AFB Smear Results No acid fast bacilli seen.    Narrative:       Collected By:[id number] BEATRICE HARRIS  Bone biopsy right hip  Collected By:[id number] BEATRICE HARRIS  Right hip fluid collection  Collected By:[id number] BEATRICE HARRIS    FLUID CULTURE W/GRAM STAIN [[id number]] Collected:  06/19/19 0930    Order Status:  Canceled Specimen:  Other from Other Body Fluid     AFB STAIN ONLY [[id number]] Collected:  06/19/19 0930  "   Order Status:  Canceled Specimen:  Other from Other Body Fluid     AFB Culture [[id number]] Collected:  06/19/19 0930    Order Status:  Sent Specimen:  Body Fluid from Other Body Fluid     Fungal Culture [[id number]] Collected:  06/19/19 0930    Order Status:  Sent Specimen:  Bone from Other Tissue     FUNGAL BLOOD CULTURE [[id number]]     Order Status:  No result Specimen:  Blood from Blood     FLUID CULTURE W/GRAM STAIN [[id number]]     Order Status:  No result Specimen:  Body Fluid from Other Body Fluid     Fungal Culture [[id number]]     Order Status:  Canceled Specimen:  Other from Other Tissue     Fungal Smear [[id number]]     Order Status:  Canceled Specimen:  Body Fluid from Other Tissue           Assessment:  67 yo female with:  Gluteal and iliopsoas abscess  Brain abscesses vs mets  Breast cancer  DM2    Plan:  Brain abscesses vs mets.  Persistent with interval progression on last MRI  MRI 4/23 \"supra and infratentorial brain parenchyma. Decrease in the size of the lesions. Interval reduction in the extent of the surrounding white matter edema consistent with improvement/treatment response of the most of the multifocal brain abscesses.  MRI 5/21 showed innumerable microabscesses vs mets  Abx (vancomycin, cefepime and Flagyl) discontinued on 5/23 after ~8 weeks of treatment-developed new fevers on 6/4  MRI on 6/8 with interval progression of supra and infratentorial intra--axial nodules and associated edema.  New right thalamus lesion. Radiology favors infection over neoplasm though both remain considerations (had been off abx)  Neurosurgery evaluation - recommended continuing antibiotics for another 2 weeks and then repeating MRI brain, as the lesions are not amenable to biopsy   Mult blood cultures neg  CHAS neg 3/15 and 5/24  +QuantiGold as above  Continue vanco/cefepime  Fluconazole 800 mg daily was added on 6/9 and is being continued at this time  Monitor renal function and Vanco trough  Vanco trough 18.6 " "6/15  Plan for repeat MRI around 6/21/2019 to assess for improvement  Will need to reassess need for biopsy if persistent or worse, and sent for pathology, regular, AFB, fungal cultures     Latent TB  As CNS lesions previously improved on nontuberculosis treatment, continuing as above  Repeat MRI in 6/8 with interval progression   If progression noted on repeat MRI brain or pelvis CT, will need biopsy with pathology, AFB, fungal, bacterial cultures    Right hip swelling  Check ultrasound    Fever of unknown origin.  Resolved  Fever and chills 6/4  Cultures negative  Now afebrile  QuantiGold+. CXR neg    Gluteal and iliopsoas abscess s/p treatment.   Adjacent to hardware in right hip (placed 12/17/18)  CT 4/23 \"Fluid collections within the right gluteal and iliacis muscles.. The collection within the right gluteal muscle has unchanged while the fluid collection within the right iliacis muscle is increased somewhat in size.\" 2.7 cm  Cultures 3/29 and 4/4 neg  MRI on 5/20/2019 - interval decrease in collection in R gluteal muscle and persistent multiloculated collection in R iliacus muscle.   CT 5/28 no change  s/p drainage on 5/30/2019-cultures negative  S/p removal hardware 6/5-no cultures done  Panculture neg  1, 3 beta glucan neg  S/p removal hardware 6/5/19  CT on 6/8 with residual abscess in the right iliacus muscle, 2.5 x 1.8 cm, slightly smaller than prior  Repeat CT 6/17 showed persistent fluid collections. I reviewed CT with radiology, Dr. Cadena and the right iliac fluid collection is amenable for aspiration, however it's high risk for colon injury. For this reason, we elected not to have aspiration of the fluid collections, but rather the bone biopsy of the iliac bone. The path is pending.    Pt did have CT-guided deep bone biopsy  on 6/19. Bone sample was sent for gram stain/routine culture, fungal smear/cx, and AFB smear/cx.  The cultures are negative so far.  There is consideration for biopsy of the " lesions in the brain  The MRI from 6/22/2019 was reviewed  I am going to discontinue all antibiotics for now so that if the biopsy is done then we can get a result      Type 2 DM  Hemoglobin A1c 6.3   Keep -140s    Discussed with Dr. Barkley, [facility]    ID will follow.  Please call with questions.

## 2019-06-24 NOTE — PROGRESS NOTES
"Received alert and oriented x 3. Forgetful at night. Check vitals sign and recorded accordingly and due med given per MAR. Monitor sign and symptoms of respiratory distress and treatment given accordingly per MAR.Medicated per MAR and reassessed every 2 hours per protocol. Still c/o of left and right hip pain.Reposition and due med for pain offered. Call light within reach. Bed alarm in placed. Needs attended. Will continue to monitor./57   Pulse 78   Temp 36.1 °C (97 °F) (Temporal)   Resp 16   Ht 1.575 m (5' 2\")   Wt 57.1 kg (125 lb 14.1 oz)   SpO2 96%   Breastfeeding? No   BMI 23.02 kg/m² .  "

## 2019-06-24 NOTE — PROGRESS NOTES
Hospital Medicine Daily Progress Note    Date of Service  6/24/2019    Chief Complaint  persistent abscess.    Hospital Course     Ms. Martini is a 68-year-old female who was transferred from [facility] on 5/29/2019 with persistent right gluteal abscesses since sacral fracture repair in December.  She has had multiple IR drainage.  She initially presented with pelvic pain, low back pain, and confusion. She also had new slurred speech. Imaging of the abdomen, pelvis and brain revealed abscesses and she was treated with a full course of IV antibiotics per ID.  Her slurred speech improved and she was able to ambulate with physical therapy. However, repeat imaging showed persistent pelvic abscesses.  MRI brain shows increase in brain lesions.  Her brain lesions are known from prior imaging and there is concern they represent metastasis.  Oncology aware and do not recommend further imaging. Cultures remained negative and a CHAS done by Dr. Potter showed no vegetations. The size of the pelvic abscesses was reviewed by interventional radiology and the case was discussed with Dr. Finley who recommended transfer to [facility] for CT guided drainage. She underwent drainage on 5/30. On 6/4, she became febrile again, so restarted on cefepime and vancomycin per ID and has been on them since.  Right hip hardware was removed on 6/5. Repeat MRI brain on 6/7/19 saw progression of the supra and infratentorial intra-axial nodules with edema, prompting a Neurosurgery consult.  Dr. Nguyen stated lesions are not amendable to biopsy stereotactically, and favors infectious etiology, recommended continued antibiotics, antineuroleptic and repeat brain MRI in 2 weeks (around 6/21).  Repeat CT pelvis on 6/9 showed residual fluid collection 2.5 x 1.8 cm in right iliacus muscle., slightly smaller than before.  Cefepime + vanc were continue. Work up was broadened to include fungal etiology, and she is started on empiric started fluconazole. Repeat CT on 6/17  showed no significant interval change in the size of the right iliacus muscle fluid collections, with changes in the right iliac bone, bilateral sacrum, and left iliac bone suspicious for osteomyelitis, and changes at L5-S1 suspicious for discitis/osteomyelitis, along with hyperdense right gluteal lesion, moderately suspicious for postoperative pseudoaneurysm with adjacent hematoma. IR felt aspiration of the fluid will have low yield, and so recommended biopsy of the nonhealing right iliac fracture area instead. Biopsy of the hip showed no AFB, organism, or fungal elements. MRI of the brain showed innumerable supra and infratentorial enhancing nodules again noted, with multiple lesions appearing somewhat larger and with increased enhancement.      Interval Problem Update  6/24/2019 - I reviewed the patient's chart. There were no significant overnight events. Remains hemodynamically stable and afebrile. Stable on RA.  Magnesium level is normal today at 1.7.  Neurosurgery has reviewed the MRI, with several lesions and large although all remain very small, and give the opinion that a right Stealth guided open biopsy of right superficial T/P lesion can be done.  I spoke with Dr. Nguyen of Neurosurgery, who will schedule her for right Stealth guided open biopsy of right superficial T/P lesion.    > I have personally seen and examined the patient today.  She is comfortable as long as she does not move, otherwise she will start to have pain on the hip left more than the right.  She is not short of breath.  She is not nauseated, and no vomiting.  No diarrhea.  No abdominal pain.  No fevers or chills.        Consultants/Specialty  Infectious disease  Orthopedic surgery  Neurosurgery, Dr. Nguyen  IR    Code Status  DNR/DNI    Disposition  Monitor on the floors.   Anticipate discharge to SNF once medically cleared. Accepted by [facility]    Review of Systems  ROS     Pertinent positives/negatives as mentioned above.     A  complete review of systems was personally done by me. All other systems were negative.     Physical Exam  Temp:  [36.1 °C (97 °F)-37.4 °C (99.3 °F)] 36.7 °C (98 °F)  Pulse:  [] 81  Resp:  [16-18] 18  BP: (127-152)/(57-68) 127/64  SpO2:  [93 %-97 %] 93 %    Physical Exam   Constitutional: She is oriented to person, place, and time. She appears well-developed and well-nourished. No distress.   HENT:   Head: Normocephalic and atraumatic.   Mouth/Throat: Oropharynx is clear and moist. No oropharyngeal exudate.   Eyes: Pupils are equal, round, and reactive to light. Conjunctivae are normal. No scleral icterus.   Neck: Normal range of motion. Neck supple.   Cardiovascular: Normal rate and regular rhythm.  Exam reveals no gallop and no friction rub.    No murmur heard.  Pulmonary/Chest: Effort normal and breath sounds normal. No respiratory distress. She has no wheezes. She has no rales. She exhibits no tenderness.   Abdominal: Soft. Bowel sounds are normal. She exhibits no distension. There is no tenderness. There is no rebound and no guarding.   Musculoskeletal: She exhibits tenderness. She exhibits no edema.   Limited range of motion of the left hip more than the right.  Moderate tenderness.  (+) Approximately 3 x 4 cm mobile mass on the right hip underlying the site of previous sutures.  No tenderness.   Lymphadenopathy:     She has no cervical adenopathy.   Neurological: She is alert and oriented to person, place, and time. No cranial nerve deficit.   Skin: Skin is warm and dry. No rash noted. She is not diaphoretic. No erythema. No pallor.   Psychiatric: She has a normal mood and affect. Her behavior is normal. Judgment and thought content normal.   Nursing note and vitals reviewed.      I have performed the physical examination today 6/24/2019.  In review of yesterday's note, there are no new changes except as documented above.      Fluids    Intake/Output Summary (Last 24 hours) at 06/24/19 1012  Last data  filed at 06/23/19 2000   Gross per 24 hour   Intake             1090 ml   Output                0 ml   Net             1090 ml       Laboratory  Recent Labs      06/22/19   1223   WBC  5.4   RBC  4.37   HEMOGLOBIN  12.2   HEMATOCRIT  38.0   MCV  87.0   MCH  27.9   MCHC  32.1*   RDW  57.1*   PLATELETCT  298   MPV  9.3                       Imaging    Assessment/Plan  * Abscess of right iliac muscle and right gluteus medius muscle- (present on admission)   Assessment & Plan    - Recurrent issue since sacral fracture repair in December 2018. Has had multiple IR drainage and antibiotics. Recent cultures remain unrevealing.    - s/p biopsy of the nonhealing right iliac fracture area. Showed no AFB, organism, or fungal elements.  Cultures negative.  - continue current antibiotics with cefepime, Diflucan, and vancomycin.  ID following for antibiotic guidance.     Brain lesion- (present on admission)   Assessment & Plan    -Repeat brain MRI after 2 weeks still shows innumerable supra and infratentorial enhancing nodules again noted, with multiple lesions appearing somewhat larger and with increased enhancement, despite antibiotics.  -I spoke with Dr. Nguyen of Neurosurgery, who will schedule her for right Stealth guided open biopsy of right superficial T/P lesion. Hold xarelto.   -continue IV antibiotics per ID.     Pseudoaneurysm following procedure (HCC)- (present on admission)   Assessment & Plan    -Likely postoperative.  No further interventions needed.  Monitor.     Sepsis (HCC)   Assessment & Plan    -This is sepsis (without associated acute organ dysfunction).  Likely from her abscesses.  Blood cultures negative.  -Sepsis has resolved.  -Continue antibiotics with cefepime, Vancomycin, Diflucan as per ID.  Further work-up as above.  -Continue close hemodynamic monitoring.  Watch for fevers.     Hypomagnesemia- (present on admission)   Assessment & Plan    -Better again today after IV replacement.   -Continue BID PO  magnesium oxide. Repeat magnesium level in AM.     Hypercalcemia- (present on admission)   Assessment & Plan    -Resolved.     Acquired circulating anticoagulants (HCC)- (present on admission)   Assessment & Plan    -I will hold her Xarelto for need for stereotactic brain biopsy.     Non-severe protein-calorie malnutrition (HCC)- (present on admission)   Assessment & Plan    -Moderate.  -Continue nutrition support per RD.       History of DVT (deep vein thrombosis)- (present on admission)   Assessment & Plan    -holding xarelto for brain biopsy     Essential hypertension- (present on admission)   Assessment & Plan    -maintaining good control.  Continue Lopressor, Cozaar, and Norvasc.  Monitor blood pressure trend closely.     Chronic hyponatremia- (present on admission)   Assessment & Plan    - remains stable. Continue daily monitoring.      RLS (restless legs syndrome)- (present on admission)   Assessment & Plan    - Continue on pramipexole.      Chronic pain- (present on admission)   Assessment & Plan    - well controlled. Primarily located at left hip.  -Continue as needed oxycodone, Flexeril, and Tylenol.     History of breast cancer- (present on admission)   Assessment & Plan    -S/p left mastectomy and breast implant.  -Needs follow-up as outpatient.     COPD (chronic obstructive pulmonary disease) (HCC)- (present on admission)   Assessment & Plan    - Not on any exacerbation at this time, continue RT protocol.          VTE prophylaxis: xarelto

## 2019-06-24 NOTE — PROGRESS NOTES
"Pharmacy Kinetics 68 y.o. female on vancomycin day # 21 2019    Currently on Vancomycin 900 mg iv q12hr (1300)     Indication for Treatment: brain abscess + iliopsoas abscess     Pertinent history per medical record: Admitted on 2019 for pelvic abscess (LTACH transfer) after recent admission at [facility] ([facility]). Concerns for brain and iliopsoas abscess. NSG and ID consulted (I&D per IR 19; hardware removal 19, CT guided bone biopsy ).      Other antibiotics: cefepime 2 gm iv q12h, fluconazole 800 mg po qd     Allergies: Patient has no known allergies.      List concerns for accumulation of vancomycin: age 68, low body weight, BUN:SCr ~20:1     Pertinent cultures to date:   19: C.Diff: Negative   19: PBCx2: NGTD   19: Pelvic abscess,BF: NGTD      MRSA nares swab if pneumonia is a concern (ordered/positive/negative/n-a): n/a    Recent Labs      19   1223   WBC  5.4   NEUTSPOLYS  68.30     No results for input(s): BUN, CREATININE, ALBUMIN in the last 72 hours.  Recent Labs      19   1223   VANCOTROUGH  28.1*     Intake/Output Summary (Last 24 hours) at 19 0759  Last data filed at 19   Gross per 24 hour   Intake             1390 ml   Output                0 ml   Net             1390 ml      /68   Pulse (!) 103   Temp 36.9 °C (98.5 °F) (Temporal)   Resp 18   Ht 1.575 m (5' 2\")   Wt 57.1 kg (125 lb 14.1 oz)   SpO2 95%  Temp (24hrs), Av.7 °C (98 °F), Min:36.1 °C (97 °F), Max:37.4 °C (99.3 °F)      A/P   1. Vancomycin dose change: none  2. Next vancomycin level:  at 1200  3. Goal trough: 18-22 mcg/mL  4. Comments: Repeat MRI showed several lesions enlarged and a biopsy could be performed by NSGY if pertinent.  Repeat trough for  AM to assess clearance of reduced regimen after previous supratherapeutic trough.  Afebrile overnight, UOP appears low but may be due to inaccurate volume accounting.  Will monitor further clinical treatment " plan.     Erwin Christiansen, PharmD, BCPS

## 2019-06-24 NOTE — PROGRESS NOTES
Dr. Barkley paged regarding pt's restless leg not being controlled by mirapex dose. Orders for night time ativan prn received.

## 2019-06-24 NOTE — THERAPY
Attempted to see pt for PT tx. Pt in too much pain, w/restless legs. Will reattempt as able.    Ashley, PTA  [phone]

## 2019-06-24 NOTE — PROGRESS NOTES
Pt medicated for pain control this evening. Frequently @ BS this evening to attend to pt needs. Will pass care to NOC RN @ [facility].

## 2019-06-24 NOTE — PROGRESS NOTES
Mri reviewed- several lesions enlarged- although all remain very small  I could perform a right stealth-guided open biopsy of right superficial T/P lesion if needed

## 2019-06-24 NOTE — CARE PLAN
Problem: Safety  Goal: Will remain free from injury  Outcome: PROGRESSING AS EXPECTED  Bed alarm on, treaded socks on, bed locked in low position, call light within reach     Problem: Venous Thromboembolism (VTW)/Deep Vein Thrombosis (DVT) Prevention:  Goal: Patient will participate in Venous Thrombosis (VTE)/Deep Vein Thrombosis (DVT)Prevention Measures  Outcome: PROGRESSING AS EXPECTED  scds in place    Problem: Skin Integrity  Goal: Risk for impaired skin integrity will decrease  Outcome: PROGRESSING AS EXPECTED  Patient turns self in bed

## 2019-06-24 NOTE — PROGRESS NOTES
Patient is alert and oriented.   Complaining of L hip pain at  9/10, medicated per mar with positive results   1x assist to use bedpan, + void  Hourly rounding, treaded socks on, call light within reach, SCDs in place, bed alarm on

## 2019-06-25 LAB
MAGNESIUM SERPL-MCNC: 1.5 MG/DL (ref 1.5–2.5)
VANCOMYCIN TROUGH SERPL-MCNC: 11 UG/ML (ref 10–20)

## 2019-06-25 PROCEDURE — 770006 HCHG ROOM/CARE - MED/SURG/GYN SEMI*

## 2019-06-25 PROCEDURE — A9270 NON-COVERED ITEM OR SERVICE: HCPCS | Performed by: INTERNAL MEDICINE

## 2019-06-25 PROCEDURE — 700102 HCHG RX REV CODE 250 W/ 637 OVERRIDE(OP): Performed by: NURSE PRACTITIONER

## 2019-06-25 PROCEDURE — A9270 NON-COVERED ITEM OR SERVICE: HCPCS | Performed by: HOSPITALIST

## 2019-06-25 PROCEDURE — 99232 SBSQ HOSP IP/OBS MODERATE 35: CPT | Performed by: INTERNAL MEDICINE

## 2019-06-25 PROCEDURE — 700101 HCHG RX REV CODE 250: Performed by: NURSE PRACTITIONER

## 2019-06-25 PROCEDURE — 700102 HCHG RX REV CODE 250 W/ 637 OVERRIDE(OP): Performed by: INTERNAL MEDICINE

## 2019-06-25 PROCEDURE — 700102 HCHG RX REV CODE 250 W/ 637 OVERRIDE(OP): Performed by: HOSPITALIST

## 2019-06-25 PROCEDURE — 700111 HCHG RX REV CODE 636 W/ 250 OVERRIDE (IP): Performed by: HOSPITALIST

## 2019-06-25 PROCEDURE — A9270 NON-COVERED ITEM OR SERVICE: HCPCS | Performed by: NURSE PRACTITIONER

## 2019-06-25 PROCEDURE — 99233 SBSQ HOSP IP/OBS HIGH 50: CPT | Performed by: HOSPITALIST

## 2019-06-25 PROCEDURE — 36415 COLL VENOUS BLD VENIPUNCTURE: CPT

## 2019-06-25 PROCEDURE — 80202 ASSAY OF VANCOMYCIN: CPT

## 2019-06-25 PROCEDURE — 83735 ASSAY OF MAGNESIUM: CPT

## 2019-06-25 PROCEDURE — 97530 THERAPEUTIC ACTIVITIES: CPT

## 2019-06-25 RX ORDER — MAGNESIUM SULFATE HEPTAHYDRATE 40 MG/ML
4 INJECTION, SOLUTION INTRAVENOUS ONCE
Status: COMPLETED | OUTPATIENT
Start: 2019-06-25 | End: 2019-06-25

## 2019-06-25 RX ADMIN — ACETAMINOPHEN 1000 MG: 500 TABLET ORAL at 04:50

## 2019-06-25 RX ADMIN — OXYCODONE HYDROCHLORIDE 10 MG: 5 TABLET ORAL at 13:24

## 2019-06-25 RX ADMIN — PRAMIPEXOLE DIHYDROCHLORIDE 1 MG: 0.5 TABLET ORAL at 04:50

## 2019-06-25 RX ADMIN — SUCRALFATE 1 G: 1 SUSPENSION ORAL at 13:04

## 2019-06-25 RX ADMIN — SUCRALFATE 1 G: 1 SUSPENSION ORAL at 04:50

## 2019-06-25 RX ADMIN — PRAMIPEXOLE DIHYDROCHLORIDE 1 MG: 0.5 TABLET ORAL at 13:04

## 2019-06-25 RX ADMIN — OMEPRAZOLE 20 MG: 20 CAPSULE, DELAYED RELEASE ORAL at 04:49

## 2019-06-25 RX ADMIN — CINACALCET HYDROCHLORIDE 60 MG: 30 TABLET, COATED ORAL at 04:51

## 2019-06-25 RX ADMIN — POTASSIUM CHLORIDE 40 MEQ: 20 TABLET, EXTENDED RELEASE ORAL at 04:49

## 2019-06-25 RX ADMIN — SUCRALFATE 1 G: 1 SUSPENSION ORAL at 17:27

## 2019-06-25 RX ADMIN — AMLODIPINE BESYLATE 10 MG: 5 TABLET ORAL at 04:49

## 2019-06-25 RX ADMIN — MAGNESIUM OXIDE TAB 400 MG (241.3 MG ELEMENTAL MG) 400 MG: 400 (241.3 MG) TAB at 04:49

## 2019-06-25 RX ADMIN — SENNOSIDES AND DOCUSATE SODIUM 2 TABLET: 8.6; 5 TABLET ORAL at 17:27

## 2019-06-25 RX ADMIN — POTASSIUM CHLORIDE 40 MEQ: 20 TABLET, EXTENDED RELEASE ORAL at 17:27

## 2019-06-25 RX ADMIN — SENNOSIDES AND DOCUSATE SODIUM 2 TABLET: 8.6; 5 TABLET ORAL at 04:49

## 2019-06-25 RX ADMIN — PRAMIPEXOLE DIHYDROCHLORIDE 1 MG: 0.5 TABLET ORAL at 17:27

## 2019-06-25 RX ADMIN — ACETAMINOPHEN 1000 MG: 500 TABLET ORAL at 17:27

## 2019-06-25 RX ADMIN — ATORVASTATIN CALCIUM 10 MG: 10 TABLET, FILM COATED ORAL at 17:27

## 2019-06-25 RX ADMIN — Medication 1 CAPSULE: at 09:13

## 2019-06-25 RX ADMIN — OXYCODONE HYDROCHLORIDE 10 MG: 5 TABLET ORAL at 09:48

## 2019-06-25 RX ADMIN — METOPROLOL TARTRATE 25 MG: 25 TABLET, FILM COATED ORAL at 17:27

## 2019-06-25 RX ADMIN — MAGNESIUM OXIDE TAB 400 MG (241.3 MG ELEMENTAL MG) 400 MG: 400 (241.3 MG) TAB at 17:27

## 2019-06-25 RX ADMIN — METOPROLOL TARTRATE 25 MG: 25 TABLET, FILM COATED ORAL at 04:49

## 2019-06-25 RX ADMIN — ONDANSETRON 4 MG: 2 INJECTION INTRAMUSCULAR; INTRAVENOUS at 17:39

## 2019-06-25 RX ADMIN — LIDOCAINE 2 PATCH: 50 PATCH CUTANEOUS at 13:04

## 2019-06-25 RX ADMIN — LOSARTAN POTASSIUM 50 MG: 50 TABLET ORAL at 04:49

## 2019-06-25 RX ADMIN — MAGNESIUM SULFATE IN WATER 4 G: 40 INJECTION, SOLUTION INTRAVENOUS at 09:13

## 2019-06-25 RX ADMIN — OXYCODONE HYDROCHLORIDE 10 MG: 5 TABLET ORAL at 20:47

## 2019-06-25 ASSESSMENT — ENCOUNTER SYMPTOMS
HEADACHES: 0
DIARRHEA: 0
NAUSEA: 0
SHORTNESS OF BREATH: 0
DIZZINESS: 0
ABDOMINAL PAIN: 0
CHILLS: 0
FEVER: 0
VOMITING: 0
COUGH: 0

## 2019-06-25 ASSESSMENT — COGNITIVE AND FUNCTIONAL STATUS - GENERAL
SUGGESTED CMS G CODE MODIFIER MOBILITY: CL
WALKING IN HOSPITAL ROOM: A LITTLE
MOVING FROM LYING ON BACK TO SITTING ON SIDE OF FLAT BED: UNABLE
TURNING FROM BACK TO SIDE WHILE IN FLAT BAD: A LITTLE
MOBILITY SCORE: 13
CLIMB 3 TO 5 STEPS WITH RAILING: A LOT
STANDING UP FROM CHAIR USING ARMS: A LITTLE
MOVING TO AND FROM BED TO CHAIR: UNABLE

## 2019-06-25 ASSESSMENT — GAIT ASSESSMENTS
DEVIATION: SHUFFLED GAIT;DECREASED HEEL STRIKE;DECREASED TOE OFF;OTHER (COMMENT)
GAIT LEVEL OF ASSIST: MINIMAL ASSIST
ASSISTIVE DEVICE: FRONT WHEEL WALKER
DISTANCE (FEET): 10

## 2019-06-25 NOTE — PROGRESS NOTES
· 2 RN skin check complete with Louie NGUYEN.  · Devices in place PIV.  · Skin assessed under devices yes.  · Confirmed pressure ulcers found on NA.  · New potential pressure ulcers noted on NA. Wound consult placed? NA. Photo uploaded? NA. MIDAS completed? NA.  · The following interventions in place q2 turns, barrier cream, heels floated, pillows for support and repositioning, incontinence checks.

## 2019-06-25 NOTE — PROGRESS NOTES
Neurosurgery Progress Note    Subjective:  No events    Exam:    A&O x2-3  PERRL, EOMI  Face symm, tongue midline  HERNANDEZ with FS grossly        BP  Min: 117/58  Max: 146/58  Pulse  Av.3  Min: 71  Max: 92  Resp  Av.5  Min: 17  Max: 18  Temp  Av.3 °C (97.4 °F)  Min: 35.8 °C (96.5 °F)  Max: 36.9 °C (98.5 °F)  SpO2  Av.5 %  Min: 91 %  Max: 94 %    No Data Recorded    Recent Labs      19   1223   WBC  5.4   RBC  4.37   HEMOGLOBIN  12.2   HEMATOCRIT  38.0   MCV  87.0   MCH  27.9   MCHC  32.1*   RDW  57.1*   PLATELETCT  298   MPV  9.3                   Intake/Output       19 - 1959 19 - 1959       Total  Total       Intake    Total Intake -- -- -- -- -- --       Output    Urine  --  200 200  --  -- --    Number of Times Voided 1 x 2 x 3 x -- -- --    Number of Times Incontinent of Urine -- 1 x 1 x -- -- --    Urine Void (mL) -- 200 200 -- -- --    Stool  --  -- --  --  -- --    Number of Times Stooled 0 x -- 0 x -- -- --    Total Output -- 200 200 -- -- --       Net I/O     -- -200 -200 -- -- --            Intake/Output Summary (Last 24 hours) at 19 0900  Last data filed at 19 1930   Gross per 24 hour   Intake                0 ml   Output              200 ml   Net             -200 ml            • magnesium sulfate  4 g Once   • LORazepam  1 mg QHS PRN   • magnesium oxide  400 mg BID   • morphine injection  2 mg Q2HRS PRN   • lactobacillus rhamnosus  1 Cap QDAY with Breakfast   • potassium chloride SA  40 mEq BID   • oxyCODONE immediate-release  5-10 mg Q4HRS PRN   • sucralfate  1 g Q6HRS   • Pharmacy Consult Request  1 Each PHARMACY TO DOSE   • acetaminophen  1,000 mg BID   • glucose  16 g Q15 MIN PRN    And   • dextrose 10% bolus  250 mL Q15 MIN PRN   • NS  500 mL Once PRN   • pramipexole  1 mg TID   • lidocaine  2 Patch Q24HR   • temazepam  15 mg QHS PRN   • cyclobenzaprine  10 mg TID PRN   • Respiratory Care  per Protocol   Continuous RT   • amLODIPine  10 mg Q DAY   • cinacalcet  60 mg DAILY   • losartan  50 mg Q DAY   • acetaminophen  650 mg Q6HRS PRN   • atorvastatin  10 mg Q EVENING   • omeprazole  20 mg DAILY   • metoprolol  25 mg TWICE DAILY   • senna-docusate  2 Tab BID    And   • polyethylene glycol/lytes  1 Packet QDAY PRN    And   • magnesium hydroxide  30 mL QDAY PRN    And   • bisacodyl  10 mg QDAY PRN   • ondansetron  4 mg Q4HRS PRN   • ondansetron  4 mg Q4HRS PRN       Assessment and Plan:  Hospital day #26 multiple cerebral mets v infection  Enlargement despite abx over past month  OR this Friday for stealth-guided right parietal open biopsy

## 2019-06-25 NOTE — PROGRESS NOTES
Infectious Disease Progress Note    Author: Beatriz Johnson M.D. Date & Time of service: 6/25/2019  3:42 PM    Chief Complaint:  Brain abscess, iliopsoas abscess, leukopenia      Interval History:  68 y.o. female admitted 5/29/2019 as a transfer from [facility] since 4/30/2019. + diabetes, SANTOSH of right hip with hardware, and breast cancer who was originally admitted to [facility] on 03/08/2019 for right hip and pelvic pain.  Work-up revealed a right iliopsoas lesion, gluteal abscess, and mult brain abscesses  6/1/2019-no fevers.  Continues to complain of significant pain in her hips.  Pain medications are being adjusted.  6/2/2019-no fevers.  Continues to remain off the antibiotics.  Awaiting Ortho evaluation  6/4/2019 patient febrile up to 103.  Having shaking chills.  Is not feeling well.  Denies any specific complaints.  6/10 AF WBC 5.3 somnolent No fever or new complaints  6/11 AF WBC 9 No fever or chills-some pain at surgical site. Limited participation with PT noted  6/12 AF WBC 7.3 somnolent but arousable-no new complaints  6/13 afebrile WBC 9.1 patient complaining of left hip pain.  Denies any headaches  6/14 afebrile, no CBC today.  Reports no new issues, tolerating antibiotics.  States the left hip pain is unchanged.  6/18 afebrile, WBC 6.9, HR 80s, -150s, on room air. Repeat CT on 6/17 showed no change in right iliac fluid collection, changes in right iliac/bilateral sacrum and left iliac suspicious of OM.   6/20- no fevers. C/o hip pain.   6/21/2019 no fevers.  Complains of some swelling at the right hip  6/22/2019-continues to complain of swelling of the right hip.  No fevers.  6/23/2019-no fevers.  The swelling is improved.  The ultrasound did not show any abscess or fluid collection.  6/24/2019 no fevers.  The hip pain is better.  No cough no shortness of breath  6/25/2019-no fevers.  The hip cultures remain negative.  She is scheduled for stereotactic biopsy on Friday.  Labs Reviewed,  Medications Reviewed, and Wound Reviewed.    Review of Systems:  Review of Systems   Constitutional: Negative for chills and fever.   Respiratory: Negative for cough and shortness of breath.    Gastrointestinal: Negative for abdominal pain, diarrhea, nausea and vomiting.   Musculoskeletal: Positive for joint pain.        Left hip   Neurological: Negative for dizziness and headaches.   All other systems reviewed and are negative.  Unchanged    Hemodynamics:  Temp (24hrs), Av.1 °C (97 °F), Min:35.8 °C (96.5 °F), Max:36.4 °C (97.5 °F)  Temperature: 35.8 °C (96.5 °F)  Pulse  Av  Min: 60  Max: 125  Blood Pressure : 117/58       Physical Exam:  Physical Exam   Constitutional: No distress.   HENT:   Mouth/Throat: Oropharynx is clear and moist. No oropharyngeal exudate.   Eyes: Conjunctivae are normal. No scleral icterus.   Neck: Neck supple.   Cardiovascular: Normal rate, regular rhythm and normal heart sounds.    No murmur heard.  Pulmonary/Chest: Effort normal and breath sounds normal. She has no wheezes. She has no rales.   Abdominal: Soft. She exhibits no distension. There is tenderness. There is no rebound.   Musculoskeletal: She exhibits tenderness. She exhibits no edema.   Right hip incision site has some swelling and induration.  No erythema   Neurological: She is alert.   No gross focal neuro deficit   Skin: Skin is warm. No rash noted. She is not diaphoretic. No erythema.   Psychiatric: She has a normal mood and affect.   Very pleasant   Nursing note and vitals reviewed.  No change compared to     Meds:    Current Facility-Administered Medications:   •  LORazepam  •  magnesium oxide  •  morphine injection  •  lactobacillus rhamnosus  •  potassium chloride SA  •  oxyCODONE immediate-release  •  sucralfate  •  Pharmacy Consult Request  •  acetaminophen  •  [DISCONTINUED] insulin regular **AND** [CANCELED] Accu-Chek ACHS **AND** NOTIFY MD and PharmD **AND** glucose **AND** dextrose 10% bolus  •  NS  •   pramipexole  •  lidocaine  •  temazepam  •  cyclobenzaprine  •  Respiratory Care per Protocol  •  amLODIPine  •  cinacalcet  •  losartan  •  acetaminophen  •  atorvastatin  •  omeprazole  •  metoprolol  •  senna-docusate **AND** polyethylene glycol/lytes **AND** magnesium hydroxide **AND** bisacodyl  •  ondansetron  •  ondansetron    Labs:  No results for input(s): WBC, RBC, HEMOGLOBIN, HEMATOCRIT, MCV, MCH, RDW, PLATELETCT, MPV, NEUTSPOLYS, LYMPHOCYTES, MONOCYTES, EOSINOPHILS, BASOPHILS, RBCMORPHOLO in the last 72 hours.  No results for input(s): SODIUM, POTASSIUM, CHLORIDE, CO2, GLUCOSE, BUN, CPKTOTAL in the last 72 hours.  No results for input(s): ALBUMIN, TBILIRUBIN, ALKPHOSPHAT, TOTPROTEIN, ALTSGPT, ASTSGOT, CREATININE in the last 72 hours.    Imaging:  MRI of the brain on 6/8/2019  Impression       1.  Interval progression of supra and infratentorial intra-axial nodules and associated edema. This short-term interval progression favors infection over neoplasm though both remain considerations.  2.  Mild atrophy         Micro:  Results     Procedure Component Value Units Date/Time    AFB Culture [[id number]] Collected:  06/19/19 0930    Order Status:  Completed Specimen:  Tissue from Other Body Fluid Updated:  06/25/19 0918     Significant Indicator NEG     Source TISS     Site Right Hip deep bone     Culture Result Culture in progress.     AFB Smear Results No acid fast bacilli seen.    Narrative:       Collected By:[id number] BEATRICE HARRIS  Bone biopsy right hip  Collected By:[id number] BEATRICE HARRIS  Right hip fluid collection  Collected By:[id number] BEATRICE HARRIS    Fungal Smear [[id number]] Collected:  06/19/19 0930    Order Status:  Completed Specimen:  Tissue from Other Body Fluid Updated:  06/25/19 0918     Significant Indicator NEG     Source TISS     Site Right Hip deep bone     Fungal Smear Results No fungal elements seen.    Narrative:       Collected By:[id number] HARRIS  Bone biopsy right hip  Collected  By:[id number] BEATRICE HARRIS  Right hip fluid collection  Collected By:[id number] BEATRICE HARRIS    Fungal Culture [[id number]] Collected:  06/19/19 0930    Order Status:  Completed Specimen:  Tissue Updated:  06/25/19 0918     Significant Indicator NEG     Source TISS     Site Right Hip deep bone     Culture Result No fungal growth to date.    Narrative:       Collected By:[id number] BEATRICE HARRIS  Bone biopsy right hip  Collected By:[id number] BEATRICE HARRIS  Right hip fluid collection  Collected By:[id number] BEATRICE HARRIS    CULTURE TISSUE W/ GRM STAIN [[id number]] Collected:  06/19/19 0930    Order Status:  Completed Specimen:  Tissue Updated:  06/25/19 0918     Significant Indicator NEG     Source TISS     Site Right Hip deep bone     Culture Result No growth at 72 hours.     Gram Stain Result No organisms seen.    Narrative:       Collected By:[id number] BEATRICE HARRIS  Bone biopsy right hip  Collected By:[id number] BEATRICE HARRIS  Right hip fluid collection  Collected By:[id number] BEATRICE HARRIS    GRAM STAIN [[id number]] Collected:  06/19/19 0930    Order Status:  Completed Specimen:  Tissue Updated:  06/19/19 2107     Significant Indicator .     Source TISS     Site Right Hip deep bone     Gram Stain Result No organisms seen.    Narrative:       Collected By:[id number] BEATRICE HARRIS  Bone biopsy right hip  Collected By:[id number] BEATRICE HARRIS  Right hip fluid collection  Collected By:[id number] BEATRICE HARRSI    Acid Fast Stain [[id number]] Collected:  06/19/19 0930    Order Status:  Completed Specimen:  Tissue Updated:  06/19/19 2107     Significant Indicator NEG     Source TISS     Site Right Hip deep bone     AFB Smear Results No acid fast bacilli seen.    Narrative:       Collected By:[id number] BEATRICE HARRIS  Bone biopsy right hip  Collected By:[id number] BEATRICE HARRIS  Right hip fluid collection  Collected By:[id number] BEATRICE HARRIS    FLUID CULTURE W/GRAM STAIN [[id number]] Collected:  06/19/19 0930    Order Status:  Canceled Specimen:  Other from Other Body  "Fluid     AFB STAIN ONLY [[id number]] Collected:  06/19/19 0930    Order Status:  Canceled Specimen:  Other from Other Body Fluid     AFB Culture [[id number]] Collected:  06/19/19 0930    Order Status:  Sent Specimen:  Body Fluid from Other Body Fluid     Fungal Culture [[id number]] Collected:  06/19/19 0930    Order Status:  Sent Specimen:  Bone from Other Tissue           Assessment:  67 yo female with:  Gluteal and iliopsoas abscess  Brain abscesses vs mets  Breast cancer  DM2    Plan:  Brain abscesses vs mets.  Persistent with interval progression on last MRI  MRI 4/23 \"supra and infratentorial brain parenchyma. Decrease in the size of the lesions. Interval reduction in the extent of the surrounding white matter edema consistent with improvement/treatment response of the most of the multifocal brain abscesses.  MRI 5/21 showed innumerable microabscesses vs mets  Abx (vancomycin, cefepime and Flagyl) discontinued on 5/23 after ~8 weeks of treatment-developed new fevers on 6/4  MRI on 6/8 with interval progression of supra and infratentorial intra--axial nodules and associated edema.  New right thalamus lesion. Radiology favors infection over neoplasm though both remain considerations (had been off abx)  Neurosurgery evaluation - recommended continuing antibiotics for another 2 weeks and then repeating MRI brain, as the lesions are not amenable to biopsy   Mult blood cultures neg  CHAS neg 3/15 and 5/24  +QuantiGold as above  Continue vanco/cefepime  Fluconazole 800 mg daily was added on 6/9 and is being continued at this time  Monitor renal function and Vanco trough  Vanco trough 18.6 6/15  Plan for repeat MRI around 6/21/2019 to assess for improvement  Will need to reassess need for biopsy if persistent or worse, and sent for pathology, regular, AFB, fungal cultures     Latent TB  As CNS lesions previously improved on nontuberculosis treatment, continuing as above  Repeat MRI in 6/8 with interval progression   If " "progression noted on repeat MRI brain or pelvis CT, will need biopsy with pathology, AFB, fungal, bacterial cultures    Right hip swelling  Check ultrasound    Fever of unknown origin.  Resolved  Fever and chills 6/4  Cultures negative  Now afebrile  QuantiGold+. CXR neg    Gluteal and iliopsoas abscess s/p treatment.   Adjacent to hardware in right hip (placed 12/17/18)  CT 4/23 \"Fluid collections within the right gluteal and iliacis muscles.. The collection within the right gluteal muscle has unchanged while the fluid collection within the right iliacis muscle is increased somewhat in size.\" 2.7 cm  Cultures 3/29 and 4/4 neg  MRI on 5/20/2019 - interval decrease in collection in R gluteal muscle and persistent multiloculated collection in R iliacus muscle.   CT 5/28 no change  s/p drainage on 5/30/2019-cultures negative  S/p removal hardware 6/5-no cultures done  Panculture neg  1, 3 beta glucan neg  S/p removal hardware 6/5/19  CT on 6/8 with residual abscess in the right iliacus muscle, 2.5 x 1.8 cm, slightly smaller than prior  Repeat CT 6/17 showed persistent fluid collections. I reviewed CT with radiology, Dr. Cadena and the right iliac fluid collection is amenable for aspiration, however it's high risk for colon injury. For this reason, we elected not to have aspiration of the fluid collections, but rather the bone biopsy of the iliac bone. The path is pending.    Pt did have CT-guided deep bone biopsy  on 6/19. Bone sample was sent for gram stain/routine culture, fungal smear/cx, and AFB smear/cx.  The cultures are negative so far.  There is consideration for biopsy of the lesions in the brain  The MRI from 6/22/2019 was reviewed  All antibiotics were discontinued in anticipation of the biopsy on Friday      Type 2 DM  Hemoglobin A1c 6.3   Keep -140s    Discussed with Dr. Wells, Hospital Medicine    ID will follow.  Please call with questions.            "

## 2019-06-25 NOTE — PROGRESS NOTES
Hospital Medicine Daily Progress Note    Date of Service  6/25/2019    Chief Complaint  persistent abscess.    Hospital Course     Ms. Martini is a 68-year-old female who was transferred from [facility] on 5/29/2019 with persistent right gluteal abscesses since sacral fracture repair in December.  She has had multiple IR drainage.  She initially presented with pelvic pain, low back pain, and confusion. She also had new slurred speech. Imaging of the abdomen, pelvis and brain revealed abscesses and she was treated with a full course of IV antibiotics per ID.  Her slurred speech improved and she was able to ambulate with physical therapy. However, repeat imaging showed persistent pelvic abscesses.  MRI brain shows increase in brain lesions.  Her brain lesions are known from prior imaging and there is concern they represent metastasis.  Oncology aware and do not recommend further imaging. Cultures remained negative and a CHAS done by Dr. Potter showed no vegetations. The size of the pelvic abscesses was reviewed by interventional radiology and the case was discussed with Dr. Finley who recommended transfer to [facility] for CT guided drainage. She underwent drainage on 5/30. On 6/4, she became febrile again, so restarted on cefepime and vancomycin per ID and has been on them since.  Right hip hardware was removed on 6/5. Repeat MRI brain on 6/7/19 saw progression of the supra and infratentorial intra-axial nodules with edema, prompting a Neurosurgery consult.  Dr. Nguyen stated lesions are not amendable to biopsy stereotactically, and favors infectious etiology, recommended continued antibiotics, antineuroleptic and repeat brain MRI in 2 weeks (around 6/21).  Repeat CT pelvis on 6/9 showed residual fluid collection 2.5 x 1.8 cm in right iliacus muscle., slightly smaller than before.  Cefepime + vanc were continue. Work up was broadened to include fungal etiology, and she is started on empiric started fluconazole. Repeat CT on 6/17  showed no significant interval change in the size of the right iliacus muscle fluid collections, with changes in the right iliac bone, bilateral sacrum, and left iliac bone suspicious for osteomyelitis, and changes at L5-S1 suspicious for discitis/osteomyelitis, along with hyperdense right gluteal lesion, moderately suspicious for postoperative pseudoaneurysm with adjacent hematoma. IR felt aspiration of the fluid will have low yield, and so recommended biopsy of the nonhealing right iliac fracture area instead. Biopsy of the hip showed no AFB, organism, or fungal elements. MRI of the brain showed innumerable supra and infratentorial enhancing nodules again noted, with multiple lesions appearing somewhat larger and with increased enhancement.      Interval Problem Update  DARYL overnight  Mag replaced  I discussed case with ID and Will stop antibiotics in anticipation for biopsy on Friday  She is feeling ok  She denies any headache, fever, abdominal pain, or leg pain    Consultants/Specialty  Infectious disease  Orthopedic surgery  Neurosurgery, Dr. Nguyen  IR    Code Status  DNR/DNI    Disposition  Anticipate discharge to SNF once medically cleared. Accepted by [facility]    Review of Systems  Review of Systems   Constitutional: Negative for chills and fever.   Gastrointestinal: Negative for abdominal pain and nausea.   Neurological: Negative for dizziness and headaches.        Physical Exam  Temp:  [35.8 °C (96.5 °F)-36.4 °C (97.5 °F)] 35.8 °C (96.5 °F)  Pulse:  [71-77] 71  Resp:  [17-18] 18  BP: (117-146)/(58-68) 117/58  SpO2:  [91 %-94 %] 92 %    Physical Exam   Constitutional: She appears well-developed and well-nourished.   HENT:   Head: Normocephalic and atraumatic.   Eyes: Conjunctivae are normal. Right eye exhibits no discharge. Left eye exhibits no discharge.   Pulmonary/Chest: Effort normal. No respiratory distress.   Abdominal: Soft. She exhibits no distension. There is no tenderness.   Musculoskeletal:  She exhibits no edema or tenderness.   Neurological: She is alert.   Skin: Skin is warm and dry.   Nursing note and vitals reviewed.      Fluids    Intake/Output Summary (Last 24 hours) at 06/25/19 1650  Last data filed at 06/25/19 0845   Gross per 24 hour   Intake              240 ml   Output              200 ml   Net               40 ml       Laboratory                        Imaging    Assessment/Plan  * Abscess of right iliac muscle and right gluteus medius muscle- (present on admission)   Assessment & Plan    - Recurrent issue since sacral fracture repair in December 2018. Has had multiple IR drainage and antibiotics. Recent cultures remain unrevealing.    - s/p biopsy of the nonhealing right iliac fracture area. Showed no AFB, organism, or fungal elements.  Cultures negative.  I discussed case with ID and will hold all abx at this time in hopes of more successful brain biopsy culture results     Brain lesion- (present on admission)   Assessment & Plan    -Repeat brain MRI after 2 weeks still shows innumerable supra and infratentorial enhancing nodules again noted, with multiple lesions appearing somewhat larger and with increased enhancement, despite antibiotics.  Plan  for right Stealth guided open biopsy of right superficial T/P lesion on 6/28     Pseudoaneurysm following procedure ([facility])- (present on admission)   Assessment & Plan    -Likely postoperative.  No further interventions needed.  Monitor.     Sepsis ([facility])   Assessment & Plan    -This is sepsis (without associated acute organ dysfunction).  Likely from her abscesses.  Blood cultures negative.  -Sepsis has resolved.  Hold antibiotics with cefepime, Vancomycin, Diflucan as per ID.  Further work-up as above.  -Continue close hemodynamic monitoring.  Watch for fevers.     Hypomagnesemia- (present on admission)   Assessment & Plan    -Better again today after IV replacement.   -Continue BID PO magnesium oxide. Repeat magnesium level in AM.      Hypercalcemia- (present on admission)   Assessment & Plan    -Resolved.     Acquired circulating anticoagulants (HCC)- (present on admission)   Assessment & Plan    -I will hold her Xarelto for need for stereotactic brain biopsy.     Non-severe protein-calorie malnutrition (HCC)- (present on admission)   Assessment & Plan    -Moderate.  -Continue nutrition support per RD.       History of DVT (deep vein thrombosis)- (present on admission)   Assessment & Plan    -holding xarelto for brain biopsy     Essential hypertension- (present on admission)   Assessment & Plan    -maintaining good control.  Continue Lopressor, Cozaar, and Norvasc.  Monitor blood pressure trend closely.     Chronic hyponatremia- (present on admission)   Assessment & Plan    - remains stable. Continue daily monitoring.      RLS (restless legs syndrome)- (present on admission)   Assessment & Plan    - Continue on pramipexole.      Chronic pain- (present on admission)   Assessment & Plan    - well controlled. Primarily located at left hip.  -Continue as needed oxycodone, Flexeril, and Tylenol.     History of breast cancer- (present on admission)   Assessment & Plan    -S/p left mastectomy and breast implant.  -Needs follow-up as outpatient.     COPD (chronic obstructive pulmonary disease) (HCC)- (present on admission)   Assessment & Plan    - Not on any exacerbation at this time, continue RT protocol.          VTE prophylaxis: SCDs    Time spend: 35 minutes. > 50 % time spend providing counseling / co ordination of care.

## 2019-06-25 NOTE — PROGRESS NOTES
"Assumed care at 1900. Received report from RN. Patient is a/o x3- forgetful.. Some confusion at times, vss on ra, up with 2 assist, q2 turns.  C/o of hip pain and RLS- PRNs available.  No other concerns expressed.  Assessment complete. Labs reviewed. Patient and RN discussed plan of care. Patient questions answered. Patient needs are met at this time. Bed in lowest and locked position. Call light is within reach. Hourly rounding in place. /68   Pulse 77   Temp 36.4 °C (97.5 °F) (Temporal)   Resp 17   Ht 1.575 m (5' 2\")   Wt 57.1 kg (125 lb 14.1 oz)   SpO2 91%   Breastfeeding? No   BMI 23.02 kg/m²   "

## 2019-06-25 NOTE — THERAPY
"Pt exhibiting improved initaiton and strenght. Pt able to ambluate to bathroom and back to bed w/minimal physical assist. Pt w/steady gait pattern w/walker use. Pt did not require physical assist to get in and out of bed, only rail use. Pt contiues to be limited by L hip pain, though functional w/mobility. Pt would benefit from further acute PT txs to progress towards goals and independence. Recommend post acute placement.    Physical Therapy Treatment completed.   Bed Mobility:  Supine to Sit: Supervised  Transfers: Sit to Stand: Minimal Assist  Gait: Level Of Assist: Minimal Assist with Front-Wheel Walker       Plan of Care: Will benefit from Physical Therapy 3 times per week    See \"Rehab Therapy-Acute\" Patient Summary Report for complete documentation.       "

## 2019-06-25 NOTE — CARE PLAN
Problem: Safety  Goal: Will remain free from falls  Outcome: PROGRESSING AS EXPECTED  Bed alarm on. Bed locked and in lowest position. Fall precautions in place. Call light within reach

## 2019-06-26 LAB
ANION GAP SERPL CALC-SCNC: 7 MMOL/L (ref 0–11.9)
BUN SERPL-MCNC: 15 MG/DL (ref 8–22)
CALCIUM SERPL-MCNC: 11.7 MG/DL (ref 8.5–10.5)
CHLORIDE SERPL-SCNC: 96 MMOL/L (ref 96–112)
CO2 SERPL-SCNC: 25 MMOL/L (ref 20–33)
CREAT SERPL-MCNC: 0.66 MG/DL (ref 0.5–1.4)
GLUCOSE SERPL-MCNC: 93 MG/DL (ref 65–99)
MAGNESIUM SERPL-MCNC: 2.1 MG/DL (ref 1.5–2.5)
POTASSIUM SERPL-SCNC: 4.6 MMOL/L (ref 3.6–5.5)
SODIUM SERPL-SCNC: 128 MMOL/L (ref 135–145)

## 2019-06-26 PROCEDURE — 700102 HCHG RX REV CODE 250 W/ 637 OVERRIDE(OP): Performed by: HOSPITALIST

## 2019-06-26 PROCEDURE — 700102 HCHG RX REV CODE 250 W/ 637 OVERRIDE(OP): Performed by: NURSE PRACTITIONER

## 2019-06-26 PROCEDURE — A9270 NON-COVERED ITEM OR SERVICE: HCPCS | Performed by: HOSPITALIST

## 2019-06-26 PROCEDURE — 700101 HCHG RX REV CODE 250: Performed by: NURSE PRACTITIONER

## 2019-06-26 PROCEDURE — 80048 BASIC METABOLIC PNL TOTAL CA: CPT

## 2019-06-26 PROCEDURE — 770006 HCHG ROOM/CARE - MED/SURG/GYN SEMI*

## 2019-06-26 PROCEDURE — 99232 SBSQ HOSP IP/OBS MODERATE 35: CPT | Performed by: HOSPITALIST

## 2019-06-26 PROCEDURE — 700102 HCHG RX REV CODE 250 W/ 637 OVERRIDE(OP): Performed by: INTERNAL MEDICINE

## 2019-06-26 PROCEDURE — 83735 ASSAY OF MAGNESIUM: CPT

## 2019-06-26 PROCEDURE — 36415 COLL VENOUS BLD VENIPUNCTURE: CPT

## 2019-06-26 PROCEDURE — A9270 NON-COVERED ITEM OR SERVICE: HCPCS | Performed by: INTERNAL MEDICINE

## 2019-06-26 PROCEDURE — A9270 NON-COVERED ITEM OR SERVICE: HCPCS | Performed by: NURSE PRACTITIONER

## 2019-06-26 PROCEDURE — 99232 SBSQ HOSP IP/OBS MODERATE 35: CPT | Performed by: INTERNAL MEDICINE

## 2019-06-26 RX ADMIN — POTASSIUM CHLORIDE 40 MEQ: 20 TABLET, EXTENDED RELEASE ORAL at 04:52

## 2019-06-26 RX ADMIN — MAGNESIUM OXIDE TAB 400 MG (241.3 MG ELEMENTAL MG) 400 MG: 400 (241.3 MG) TAB at 04:53

## 2019-06-26 RX ADMIN — SUCRALFATE 1 G: 1 SUSPENSION ORAL at 11:08

## 2019-06-26 RX ADMIN — OXYCODONE HYDROCHLORIDE 10 MG: 5 TABLET ORAL at 18:14

## 2019-06-26 RX ADMIN — ACETAMINOPHEN 1000 MG: 500 TABLET ORAL at 04:52

## 2019-06-26 RX ADMIN — LORAZEPAM 1 MG: 1 TABLET ORAL at 00:06

## 2019-06-26 RX ADMIN — OXYCODONE HYDROCHLORIDE 10 MG: 5 TABLET ORAL at 11:08

## 2019-06-26 RX ADMIN — POTASSIUM CHLORIDE 40 MEQ: 20 TABLET, EXTENDED RELEASE ORAL at 17:54

## 2019-06-26 RX ADMIN — METOPROLOL TARTRATE 25 MG: 25 TABLET, FILM COATED ORAL at 22:20

## 2019-06-26 RX ADMIN — PRAMIPEXOLE DIHYDROCHLORIDE 1 MG: 0.5 TABLET ORAL at 04:52

## 2019-06-26 RX ADMIN — Medication 1 CAPSULE: at 08:55

## 2019-06-26 RX ADMIN — TEMAZEPAM 15 MG: 15 CAPSULE ORAL at 22:22

## 2019-06-26 RX ADMIN — SENNOSIDES AND DOCUSATE SODIUM 2 TABLET: 8.6; 5 TABLET ORAL at 17:54

## 2019-06-26 RX ADMIN — LIDOCAINE 2 PATCH: 50 PATCH CUTANEOUS at 11:08

## 2019-06-26 RX ADMIN — ATORVASTATIN CALCIUM 10 MG: 10 TABLET, FILM COATED ORAL at 17:54

## 2019-06-26 RX ADMIN — SUCRALFATE 1 G: 1 SUSPENSION ORAL at 00:05

## 2019-06-26 RX ADMIN — CINACALCET HYDROCHLORIDE 60 MG: 30 TABLET, COATED ORAL at 04:53

## 2019-06-26 RX ADMIN — AMLODIPINE BESYLATE 10 MG: 5 TABLET ORAL at 04:52

## 2019-06-26 RX ADMIN — SUCRALFATE 1 G: 1 SUSPENSION ORAL at 17:54

## 2019-06-26 RX ADMIN — ACETAMINOPHEN 1000 MG: 500 TABLET ORAL at 17:54

## 2019-06-26 RX ADMIN — PRAMIPEXOLE DIHYDROCHLORIDE 1 MG: 0.5 TABLET ORAL at 12:41

## 2019-06-26 RX ADMIN — OXYCODONE HYDROCHLORIDE 10 MG: 5 TABLET ORAL at 05:01

## 2019-06-26 RX ADMIN — MAGNESIUM OXIDE TAB 400 MG (241.3 MG ELEMENTAL MG) 400 MG: 400 (241.3 MG) TAB at 17:54

## 2019-06-26 RX ADMIN — METOPROLOL TARTRATE 25 MG: 25 TABLET, FILM COATED ORAL at 04:53

## 2019-06-26 RX ADMIN — LOSARTAN POTASSIUM 50 MG: 50 TABLET ORAL at 04:53

## 2019-06-26 RX ADMIN — OMEPRAZOLE 20 MG: 20 CAPSULE, DELAYED RELEASE ORAL at 04:52

## 2019-06-26 RX ADMIN — PRAMIPEXOLE DIHYDROCHLORIDE 1 MG: 0.5 TABLET ORAL at 22:19

## 2019-06-26 ASSESSMENT — ENCOUNTER SYMPTOMS
DIARRHEA: 0
SHORTNESS OF BREATH: 0
HEADACHES: 0
DIZZINESS: 0
FEVER: 0
BACK PAIN: 1
NAUSEA: 0
ABDOMINAL PAIN: 0
VOMITING: 0
CHILLS: 0
COUGH: 0

## 2019-06-26 NOTE — PROGRESS NOTES
"Assumed care at 1900. Received report from RN. Patient is AOx3. Patient complains of pain in lower back/sacrum. PRN pain meds given. Patient is q2h turns in bed. Skin is intact. Pending MRI with bx of brain possibly Friday. Assessment complete. Labs reviewed. Patient and RN discussed plan of care. Patient questions answered. Patient needs are met at this time. Bed in lowest and locked position. Call light is within reach. Hourly rounding in place. /83   Pulse 81   Temp 37 °C (98.6 °F) (Temporal)   Resp 18   Ht 1.575 m (5' 2\")   Wt 57.1 kg (125 lb 14.1 oz)   SpO2 93%   Breastfeeding? No   BMI 23.02 kg/m²       "

## 2019-06-26 NOTE — PROGRESS NOTES
Neurosurgery Progress Note    Subjective:  No events    Exam:    A&O x2-3  PERRL, EOMI  Face symm, tongue midline  HERNANDEZ with FS grossly        BP  Min: 110/83  Max: 167/68  Pulse  Av.3  Min: 54  Max: 85  Resp  Av.3  Min: 15  Max: 18  Temp  Av.7 °C (98 °F)  Min: 36.2 °C (97.2 °F)  Max: 37 °C (98.6 °F)  SpO2  Av.5 %  Min: 91 %  Max: 96 %    No Data Recorded        Recent Labs      19   0041   SODIUM  128*   POTASSIUM  4.6   CHLORIDE  96   CO2  25   GLUCOSE  93   BUN  15   CREATININE  0.66   CALCIUM  11.7*               Intake/Output       1900 - 1959 19 - 19 0659      [id number] 9242-6230 Total 6870-1659 1895-0345 Total       Intake    P.O.  240  250 490  --  -- --    P.O. 240 250 490 -- -- --    Total Intake 240 250 490 -- -- --       Output    Total Output -- -- -- -- -- --       Net I/O     240 250 490 -- -- --            Intake/Output Summary (Last 24 hours) at 19 1041  Last data filed at 19 2047   Gross per 24 hour   Intake              250 ml   Output                0 ml   Net              250 ml            • LORazepam  1 mg QHS PRN   • magnesium oxide  400 mg BID   • morphine injection  2 mg Q2HRS PRN   • lactobacillus rhamnosus  1 Cap QDAY with Breakfast   • potassium chloride SA  40 mEq BID   • oxyCODONE immediate-release  5-10 mg Q4HRS PRN   • sucralfate  1 g Q6HRS   • Pharmacy Consult Request  1 Each PHARMACY TO DOSE   • acetaminophen  1,000 mg BID   • glucose  16 g Q15 MIN PRN    And   • dextrose 10% bolus  250 mL Q15 MIN PRN   • NS  500 mL Once PRN   • pramipexole  1 mg TID   • lidocaine  2 Patch Q24HR   • temazepam  15 mg QHS PRN   • cyclobenzaprine  10 mg TID PRN   • Respiratory Care per Protocol   Continuous RT   • amLODIPine  10 mg Q DAY   • cinacalcet  60 mg DAILY   • losartan  50 mg Q DAY   • acetaminophen  650 mg Q6HRS PRN   • atorvastatin  10 mg Q EVENING   • omeprazole  20 mg DAILY   • metoprolol  25 mg TWICE DAILY   •  senna-docusate  2 Tab BID    And   • polyethylene glycol/lytes  1 Packet QDAY PRN    And   • magnesium hydroxide  30 mL QDAY PRN    And   • bisacodyl  10 mg QDAY PRN   • ondansetron  4 mg Q4HRS PRN   • ondansetron  4 mg Q4HRS PRN       Assessment and Plan:  Hospital day #267 multiple cerebral mets v infection  Enlargement despite abx over past month  OR this Friday for stealth-guided right parietal open biopsy  Na 128 - management  Per IM    ATTENDING ADDENDUM:  Patient seen independently and agree with above note  Called phone number listed in chart- not accepting calls  Called brother listed- left message to return my call  Plan surgery as above friday

## 2019-06-26 NOTE — PROGRESS NOTES
Infectious Disease Progress Note    Author: Beatriz Johnson M.D. Date & Time of service: 6/26/2019  11:10 AM    Chief Complaint:  Brain abscess, iliopsoas abscess, leukopenia      Interval History:  68 y.o. female admitted 5/29/2019 as a transfer from [facility] since 4/30/2019. + diabetes, SANTOSH of right hip with hardware, and breast cancer who was originally admitted to [facility] on 03/08/2019 for right hip and pelvic pain.  Work-up revealed a right iliopsoas lesion, gluteal abscess, and mult brain abscesses  6/1/2019-no fevers.  Continues to complain of significant pain in her hips.  Pain medications are being adjusted.  6/2/2019-no fevers.  Continues to remain off the antibiotics.  Awaiting Ortho evaluation  6/4/2019 patient febrile up to 103.  Having shaking chills.  Is not feeling well.  Denies any specific complaints.  6/10 AF WBC 5.3 somnolent No fever or new complaints  6/11 AF WBC 9 No fever or chills-some pain at surgical site. Limited participation with PT noted  6/12 AF WBC 7.3 somnolent but arousable-no new complaints  6/13 afebrile WBC 9.1 patient complaining of left hip pain.  Denies any headaches  6/14 afebrile, no CBC today.  Reports no new issues, tolerating antibiotics.  States the left hip pain is unchanged.  6/18 afebrile, WBC 6.9, HR 80s, -150s, on room air. Repeat CT on 6/17 showed no change in right iliac fluid collection, changes in right iliac/bilateral sacrum and left iliac suspicious of OM.   6/20- no fevers. C/o hip pain.   6/21/2019 no fevers.  Complains of some swelling at the right hip  6/22/2019-continues to complain of swelling of the right hip.  No fevers.  6/23/2019-no fevers.  The swelling is improved.  The ultrasound did not show any abscess or fluid collection.  6/24/2019 no fevers.  The hip pain is better.  No cough no shortness of breath  6/25/2019-no fevers.  The hip cultures remain negative.  She is scheduled for stereotactic biopsy on Friday.  6/26/2019 no fevers are  noted.  Patient complains of back pain.  In significant distress.  Labs Reviewed, Medications Reviewed, and Wound Reviewed.    Review of Systems:  Review of Systems   Constitutional: Negative for chills and fever.   Respiratory: Negative for cough and shortness of breath.    Gastrointestinal: Negative for abdominal pain, diarrhea, nausea and vomiting.   Musculoskeletal: Positive for back pain and joint pain.        Left hip   Neurological: Negative for dizziness and headaches.   All other systems reviewed and are negative.  Unchanged    Hemodynamics:  Temp (24hrs), Av.7 °C (98 °F), Min:36.2 °C (97.2 °F), Max:37 °C (98.6 °F)  Temperature: 36.2 °C (97.2 °F)  Pulse  Av.6  Min: 54  Max: 125  Blood Pressure : 113/49       Physical Exam:  Physical Exam   Constitutional: She appears distressed.   Distress due to pain   HENT:   Mouth/Throat: Oropharynx is clear and moist. No oropharyngeal exudate.   Eyes: Conjunctivae are normal. No scleral icterus.   Neck: Neck supple.   Cardiovascular: Normal rate, regular rhythm and normal heart sounds.    No murmur heard.  Pulmonary/Chest: Effort normal and breath sounds normal. She has no wheezes. She has no rales.   Abdominal: Soft. She exhibits no distension. There is tenderness. There is no rebound.   Musculoskeletal: She exhibits tenderness. She exhibits no edema.   Right hip incision site has some swelling and induration.  No erythema   Neurological: She is alert.   No gross focal neuro deficit   Skin: Skin is warm. No rash noted. She is not diaphoretic. No erythema.   Psychiatric: She has a normal mood and affect.   Very pleasant   Nursing note and vitals reviewed.      Meds:    Current Facility-Administered Medications:   •  LORazepam  •  magnesium oxide  •  morphine injection  •  lactobacillus rhamnosus  •  potassium chloride SA  •  oxyCODONE immediate-release  •  sucralfate  •  Pharmacy Consult Request  •  acetaminophen  •  [DISCONTINUED] insulin regular **AND**  [CANCELED] Accu-Chek ACHS **AND** NOTIFY MD and PharmD **AND** glucose **AND** dextrose 10% bolus  •  NS  •  pramipexole  •  lidocaine  •  temazepam  •  cyclobenzaprine  •  Respiratory Care per Protocol  •  amLODIPine  •  cinacalcet  •  losartan  •  acetaminophen  •  atorvastatin  •  omeprazole  •  metoprolol  •  senna-docusate **AND** polyethylene glycol/lytes **AND** magnesium hydroxide **AND** bisacodyl  •  ondansetron  •  ondansetron    Labs:  No results for input(s): WBC, RBC, HEMOGLOBIN, HEMATOCRIT, MCV, MCH, RDW, PLATELETCT, MPV, NEUTSPOLYS, LYMPHOCYTES, MONOCYTES, EOSINOPHILS, BASOPHILS, RBCMORPHOLO in the last 72 hours.  Recent Labs      06/26/19   0041   SODIUM  128*   POTASSIUM  4.6   CHLORIDE  96   CO2  25   GLUCOSE  93   BUN  15     Recent Labs      06/26/19   0041   CREATININE  0.66       Imaging:  MRI of the brain on 6/8/2019  Impression       1.  Interval progression of supra and infratentorial intra-axial nodules and associated edema. This short-term interval progression favors infection over neoplasm though both remain considerations.  2.  Mild atrophy         Micro:  Results     Procedure Component Value Units Date/Time    AFB Culture [[id number]] Collected:  06/19/19 0930    Order Status:  Completed Specimen:  Tissue from Other Body Fluid Updated:  06/25/19 0918     Significant Indicator NEG     Source TISS     Site Right Hip deep bone     Culture Result Culture in progress.     AFB Smear Results No acid fast bacilli seen.    Narrative:       Collected By:[id number] BEATRICE HARRIS  Bone biopsy right hip  Collected By:[id number] BEATRICE HARRIS  Right hip fluid collection  Collected By:[id number] BEATRICE HARRIS    Fungal Smear [[id number]] Collected:  06/19/19 0930    Order Status:  Completed Specimen:  Tissue from Other Body Fluid Updated:  06/25/19 0918     Significant Indicator NEG     Source TISS     Site Right Hip deep bone     Fungal Smear Results No fungal elements seen.    Narrative:       Collected  By:[id number] BEATRICE HARRIS  Bone biopsy right hip  Collected By:[id number] BEATRICE HARRIS  Right hip fluid collection  Collected By:[id number] BEATRICE HARRIS    Fungal Culture [[id number]] Collected:  06/19/19 0930    Order Status:  Completed Specimen:  Tissue Updated:  06/25/19 0918     Significant Indicator NEG     Source TISS     Site Right Hip deep bone     Culture Result No fungal growth to date.    Narrative:       Collected By:[id number] BEATRICE HARRIS  Bone biopsy right hip  Collected By:[id number] BEATRICE HARRIS  Right hip fluid collection  Collected By:[id number] BEATRICE HARRIS    CULTURE TISSUE W/ GRM STAIN [[id number]] Collected:  06/19/19 0930    Order Status:  Completed Specimen:  Tissue Updated:  06/25/19 0918     Significant Indicator NEG     Source TISS     Site Right Hip deep bone     Culture Result No growth at 72 hours.     Gram Stain Result No organisms seen.    Narrative:       Collected By:[id number] BEATRICE HARRIS  Bone biopsy right hip  Collected By:[id number] BEATRICE HARRIS  Right hip fluid collection  Collected By:[id number] BEATRICE HARRIS    GRAM STAIN [[id number]] Collected:  06/19/19 0930    Order Status:  Completed Specimen:  Tissue Updated:  06/19/19 2107     Significant Indicator .     Source TISS     Site Right Hip deep bone     Gram Stain Result No organisms seen.    Narrative:       Collected By:[id number] BEATRICE HARRIS  Bone biopsy right hip  Collected By:[id number] BEATRICE HARRIS  Right hip fluid collection  Collected By:[id number] BEATRICE HARRIS    Acid Fast Stain [[id number]] Collected:  06/19/19 0930    Order Status:  Completed Specimen:  Tissue Updated:  06/19/19 2107     Significant Indicator NEG     Source TISS     Site Right Hip deep bone     AFB Smear Results No acid fast bacilli seen.    Narrative:       Collected By:[id number] BEATRICE HARRIS  Bone biopsy right hip  Collected By:[id number] BEATRICE HARRIS  Right hip fluid collection  Collected By:[id number] BEATRICE HARRIS          Assessment:  69 yo female with:  Gluteal and iliopsoas  "abscess  Brain abscesses vs mets  Breast cancer  DM2    Plan:  Brain abscesses vs mets.  Persistent with interval progression on last MRI  Patient has received prolonged antibiotics  Now the antibiotics have been discontinued since patient is going to get a stereotactic biopsy     Latent TB  As CNS lesions previously improved on nontuberculosis treatment, continuing as above  Repeat MRI in 6/8 with interval progression       Fever of unknown origin.  Resolved  Fever and chills 6/4  Cultures negative  Now afebrile  QuantiGold+. CXR neg    Gluteal and iliopsoas abscess s/p treatment.   Adjacent to hardware in right hip (placed 12/17/18)  CT 4/23 \"Fluid collections within the right gluteal and iliacis muscles.. The collection within the right gluteal muscle has unchanged while the fluid collection within the right iliacis muscle is increased somewhat in size.\" 2.7 cm  Cultures 3/29 and 4/4 neg  MRI on 5/20/2019 - interval decrease in collection in R gluteal muscle and persistent multiloculated collection in R iliacus muscle.   CT 5/28 no change  s/p drainage on 5/30/2019-cultures negative  S/p removal hardware 6/5-no cultures done  Panculture neg  1, 3 beta glucan neg  S/p removal hardware 6/5/19  CT on 6/8 with residual abscess in the right iliacus muscle, 2.5 x 1.8 cm, slightly smaller than prior  Had CT-guided deep bone biopsy on 6/19/2019.  Those cultures remain negative but the pathology is not available yet    Discitis osteomyelitis  Changes at L5-S1 suspicious for discitis osteo-mellitus on the CT scan done on 6/17/2019    Type 2 DM  Hemoglobin A1c 6.3   Keep -140s    Discussed with Dr. Wells, Hospital Medicine    ID will follow.  Please call with questions.            "

## 2019-06-26 NOTE — CARE PLAN
Problem: Discharge Barriers/Planning  Goal: Patient's continuum of care needs will be met  Outcome: PROGRESSING AS EXPECTED  No discharge plan at this time.    Problem: Mobility  Goal: Risk for activity intolerance will decrease  Outcome: PROGRESSING SLOWER THAN EXPECTED  Pt can ambulate to bathroom with FWW assistance of two.

## 2019-06-27 LAB
ABO + RH BLD: NORMAL
ABO GROUP BLD: NORMAL
ANION GAP SERPL CALC-SCNC: 8 MMOL/L (ref 0–11.9)
APTT PPP: 32.5 SEC (ref 24.7–36)
BASOPHILS # BLD AUTO: 0.8 % (ref 0–1.8)
BASOPHILS # BLD: 0.05 K/UL (ref 0–0.12)
BLD GP AB SCN SERPL QL: NORMAL
BUN SERPL-MCNC: 17 MG/DL (ref 8–22)
CALCIUM SERPL-MCNC: 12.3 MG/DL (ref 8.5–10.5)
CHLORIDE SERPL-SCNC: 99 MMOL/L (ref 96–112)
CO2 SERPL-SCNC: 25 MMOL/L (ref 20–33)
CREAT SERPL-MCNC: 0.65 MG/DL (ref 0.5–1.4)
EKG IMPRESSION: NORMAL
EOSINOPHIL # BLD AUTO: 0.22 K/UL (ref 0–0.51)
EOSINOPHIL NFR BLD: 3.3 % (ref 0–6.9)
ERYTHROCYTE [DISTWIDTH] IN BLOOD BY AUTOMATED COUNT: 56.4 FL (ref 35.9–50)
GLUCOSE SERPL-MCNC: 85 MG/DL (ref 65–99)
HCT VFR BLD AUTO: 42.4 % (ref 37–47)
HGB BLD-MCNC: 13.4 G/DL (ref 12–16)
IMM GRANULOCYTES # BLD AUTO: 0.02 K/UL (ref 0–0.11)
IMM GRANULOCYTES NFR BLD AUTO: 0.3 % (ref 0–0.9)
INR PPP: 1.02 (ref 0.87–1.13)
LYMPHOCYTES # BLD AUTO: 1.03 K/UL (ref 1–4.8)
LYMPHOCYTES NFR BLD: 15.7 % (ref 22–41)
MCH RBC QN AUTO: 28.2 PG (ref 27–33)
MCHC RBC AUTO-ENTMCNC: 31.6 G/DL (ref 33.6–35)
MCV RBC AUTO: 89.3 FL (ref 81.4–97.8)
MONOCYTES # BLD AUTO: 0.68 K/UL (ref 0–0.85)
MONOCYTES NFR BLD AUTO: 10.4 % (ref 0–13.4)
NEUTROPHILS # BLD AUTO: 4.57 K/UL (ref 2–7.15)
NEUTROPHILS NFR BLD: 69.5 % (ref 44–72)
NRBC # BLD AUTO: 0 K/UL
NRBC BLD-RTO: 0 /100 WBC
PLATELET # BLD AUTO: 369 K/UL (ref 164–446)
PMV BLD AUTO: 9.4 FL (ref 9–12.9)
POTASSIUM SERPL-SCNC: 4.9 MMOL/L (ref 3.6–5.5)
PROTHROMBIN TIME: 13.6 SEC (ref 12–14.6)
RBC # BLD AUTO: 4.75 M/UL (ref 4.2–5.4)
RH BLD: NORMAL
SODIUM SERPL-SCNC: 132 MMOL/L (ref 135–145)
WBC # BLD AUTO: 6.6 K/UL (ref 4.8–10.8)

## 2019-06-27 PROCEDURE — A9270 NON-COVERED ITEM OR SERVICE: HCPCS | Performed by: INTERNAL MEDICINE

## 2019-06-27 PROCEDURE — 99232 SBSQ HOSP IP/OBS MODERATE 35: CPT | Performed by: HOSPITALIST

## 2019-06-27 PROCEDURE — 700102 HCHG RX REV CODE 250 W/ 637 OVERRIDE(OP): Performed by: INTERNAL MEDICINE

## 2019-06-27 PROCEDURE — 700102 HCHG RX REV CODE 250 W/ 637 OVERRIDE(OP): Performed by: HOSPITALIST

## 2019-06-27 PROCEDURE — 85730 THROMBOPLASTIN TIME PARTIAL: CPT

## 2019-06-27 PROCEDURE — 85025 COMPLETE CBC W/AUTO DIFF WBC: CPT

## 2019-06-27 PROCEDURE — 700102 HCHG RX REV CODE 250 W/ 637 OVERRIDE(OP): Performed by: NURSE PRACTITIONER

## 2019-06-27 PROCEDURE — A9270 NON-COVERED ITEM OR SERVICE: HCPCS | Performed by: HOSPITALIST

## 2019-06-27 PROCEDURE — 80048 BASIC METABOLIC PNL TOTAL CA: CPT

## 2019-06-27 PROCEDURE — 86901 BLOOD TYPING SEROLOGIC RH(D): CPT

## 2019-06-27 PROCEDURE — A9270 NON-COVERED ITEM OR SERVICE: HCPCS | Performed by: NURSE PRACTITIONER

## 2019-06-27 PROCEDURE — 85610 PROTHROMBIN TIME: CPT

## 2019-06-27 PROCEDURE — 97530 THERAPEUTIC ACTIVITIES: CPT

## 2019-06-27 PROCEDURE — 93005 ELECTROCARDIOGRAM TRACING: CPT | Performed by: NURSE PRACTITIONER

## 2019-06-27 PROCEDURE — 93010 ELECTROCARDIOGRAM REPORT: CPT | Performed by: INTERNAL MEDICINE

## 2019-06-27 PROCEDURE — 700101 HCHG RX REV CODE 250: Performed by: NURSE PRACTITIONER

## 2019-06-27 PROCEDURE — 86850 RBC ANTIBODY SCREEN: CPT

## 2019-06-27 PROCEDURE — 99232 SBSQ HOSP IP/OBS MODERATE 35: CPT | Performed by: INTERNAL MEDICINE

## 2019-06-27 PROCEDURE — 770006 HCHG ROOM/CARE - MED/SURG/GYN SEMI*

## 2019-06-27 PROCEDURE — 86900 BLOOD TYPING SEROLOGIC ABO: CPT

## 2019-06-27 RX ORDER — OXYCODONE HCL 10 MG/1
10 TABLET, FILM COATED, EXTENDED RELEASE ORAL EVERY 12 HOURS
Status: DISCONTINUED | OUTPATIENT
Start: 2019-06-27 | End: 2019-07-07

## 2019-06-27 RX ORDER — GABAPENTIN 100 MG/1
100 CAPSULE ORAL 3 TIMES DAILY
Status: DISCONTINUED | OUTPATIENT
Start: 2019-06-27 | End: 2019-07-11

## 2019-06-27 RX ORDER — POTASSIUM CHLORIDE 20 MEQ/1
20 TABLET, EXTENDED RELEASE ORAL DAILY
Status: DISCONTINUED | OUTPATIENT
Start: 2019-06-28 | End: 2019-06-28

## 2019-06-27 RX ADMIN — ACETAMINOPHEN 1000 MG: 500 TABLET ORAL at 18:47

## 2019-06-27 RX ADMIN — OMEPRAZOLE 20 MG: 20 CAPSULE, DELAYED RELEASE ORAL at 04:51

## 2019-06-27 RX ADMIN — OXYCODONE HYDROCHLORIDE 10 MG: 5 TABLET ORAL at 04:51

## 2019-06-27 RX ADMIN — METOPROLOL TARTRATE 25 MG: 25 TABLET, FILM COATED ORAL at 18:47

## 2019-06-27 RX ADMIN — GABAPENTIN 100 MG: 100 CAPSULE ORAL at 12:02

## 2019-06-27 RX ADMIN — SUCRALFATE 1 G: 1 SUSPENSION ORAL at 18:47

## 2019-06-27 RX ADMIN — MAGNESIUM OXIDE TAB 400 MG (241.3 MG ELEMENTAL MG) 400 MG: 400 (241.3 MG) TAB at 04:53

## 2019-06-27 RX ADMIN — SUCRALFATE 1 G: 1 SUSPENSION ORAL at 12:03

## 2019-06-27 RX ADMIN — ACETAMINOPHEN 1000 MG: 500 TABLET ORAL at 04:53

## 2019-06-27 RX ADMIN — MAGNESIUM OXIDE TAB 400 MG (241.3 MG ELEMENTAL MG) 400 MG: 400 (241.3 MG) TAB at 18:46

## 2019-06-27 RX ADMIN — SUCRALFATE 1 G: 1 SUSPENSION ORAL at 04:50

## 2019-06-27 RX ADMIN — AMLODIPINE BESYLATE 10 MG: 5 TABLET ORAL at 04:53

## 2019-06-27 RX ADMIN — OXYCODONE HYDROCHLORIDE 10 MG: 10 TABLET, FILM COATED, EXTENDED RELEASE ORAL at 12:03

## 2019-06-27 RX ADMIN — LIDOCAINE 2 PATCH: 50 PATCH CUTANEOUS at 12:02

## 2019-06-27 RX ADMIN — PRAMIPEXOLE DIHYDROCHLORIDE 1 MG: 0.5 TABLET ORAL at 12:02

## 2019-06-27 RX ADMIN — Medication 1 CAPSULE: at 09:38

## 2019-06-27 RX ADMIN — METOPROLOL TARTRATE 25 MG: 25 TABLET, FILM COATED ORAL at 04:53

## 2019-06-27 RX ADMIN — PRAMIPEXOLE DIHYDROCHLORIDE 1 MG: 0.5 TABLET ORAL at 18:47

## 2019-06-27 RX ADMIN — CYCLOBENZAPRINE 10 MG: 10 TABLET, FILM COATED ORAL at 09:38

## 2019-06-27 RX ADMIN — PRAMIPEXOLE DIHYDROCHLORIDE 1 MG: 0.5 TABLET ORAL at 04:52

## 2019-06-27 RX ADMIN — ATORVASTATIN CALCIUM 10 MG: 10 TABLET, FILM COATED ORAL at 18:46

## 2019-06-27 RX ADMIN — GABAPENTIN 100 MG: 100 CAPSULE ORAL at 18:47

## 2019-06-27 RX ADMIN — POTASSIUM CHLORIDE 40 MEQ: 20 TABLET, EXTENDED RELEASE ORAL at 04:53

## 2019-06-27 RX ADMIN — SENNOSIDES AND DOCUSATE SODIUM 2 TABLET: 8.6; 5 TABLET ORAL at 18:46

## 2019-06-27 RX ADMIN — CINACALCET HYDROCHLORIDE 60 MG: 30 TABLET, COATED ORAL at 04:50

## 2019-06-27 RX ADMIN — LOSARTAN POTASSIUM 50 MG: 50 TABLET ORAL at 04:53

## 2019-06-27 RX ADMIN — OXYCODONE HYDROCHLORIDE 10 MG: 5 TABLET ORAL at 09:37

## 2019-06-27 ASSESSMENT — ENCOUNTER SYMPTOMS
HEADACHES: 0
VOMITING: 0
BACK PAIN: 1
CHILLS: 0
SHORTNESS OF BREATH: 0
ABDOMINAL PAIN: 0
DIARRHEA: 0
FEVER: 0
NAUSEA: 0
DIZZINESS: 0
COUGH: 0

## 2019-06-27 ASSESSMENT — GAIT ASSESSMENTS
ASSISTIVE DEVICE: FRONT WHEEL WALKER
DISTANCE (FEET): 5
GAIT LEVEL OF ASSIST: MINIMAL ASSIST

## 2019-06-27 ASSESSMENT — COGNITIVE AND FUNCTIONAL STATUS - GENERAL
MOBILITY SCORE: 13
TURNING FROM BACK TO SIDE WHILE IN FLAT BAD: A LITTLE
SUGGESTED CMS G CODE MODIFIER MOBILITY: CL
WALKING IN HOSPITAL ROOM: A LITTLE
MOVING TO AND FROM BED TO CHAIR: UNABLE
STANDING UP FROM CHAIR USING ARMS: A LITTLE
MOVING FROM LYING ON BACK TO SITTING ON SIDE OF FLAT BED: UNABLE
CLIMB 3 TO 5 STEPS WITH RAILING: A LOT

## 2019-06-27 NOTE — CARE PLAN
Problem: Communication  Goal: The ability to communicate needs accurately and effectively will improve  Outcome: PROGRESSING AS EXPECTED  All patient questions/concerns answered at this time.     Problem: Venous Thromboembolism (VTW)/Deep Vein Thrombosis (DVT) Prevention:  Goal: Patient will participate in Venous Thrombosis (VTE)/Deep Vein Thrombosis (DVT)Prevention Measures  Outcome: PROGRESSING AS EXPECTED  Patient has SCDs ordered; machine on.

## 2019-06-27 NOTE — PROGRESS NOTES
Get from Lab called with critical result of Ca 12.3 at 1211. Critical lab result read back to Essence.   Dr. Wells notified of critical lab result at 1220.  Critical lab result read back by Dr. Wells.

## 2019-06-27 NOTE — PROGRESS NOTES
Hospital Medicine Daily Progress Note    Date of Service  6/26/2019    Chief Complaint  persistent abscess.    Hospital Course     Ms. Martini is a 68-year-old female who was transferred from [facility] on 5/29/2019 with persistent right gluteal abscesses since sacral fracture repair in December.  She has had multiple IR drainage.  She initially presented with pelvic pain, low back pain, and confusion. She also had new slurred speech. Imaging of the abdomen, pelvis and brain revealed abscesses and she was treated with a full course of IV antibiotics per ID.  Her slurred speech improved and she was able to ambulate with physical therapy. However, repeat imaging showed persistent pelvic abscesses.  MRI brain shows increase in brain lesions.  Her brain lesions are known from prior imaging and there is concern they represent metastasis.  Oncology aware and do not recommend further imaging. Cultures remained negative and a CHAS done by Dr. Potter showed no vegetations. The size of the pelvic abscesses was reviewed by interventional radiology and the case was discussed with Dr. Finley who recommended transfer to [facility] for CT guided drainage. She underwent drainage on 5/30. On 6/4, she became febrile again, so restarted on cefepime and vancomycin per ID and has been on them since.  Right hip hardware was removed on 6/5. Repeat MRI brain on 6/7/19 saw progression of the supra and infratentorial intra-axial nodules with edema, prompting a Neurosurgery consult.  Dr. Nguyen stated lesions are not amendable to biopsy stereotactically, and favors infectious etiology, recommended continued antibiotics, antineuroleptic and repeat brain MRI in 2 weeks (around 6/21).  Repeat CT pelvis on 6/9 showed residual fluid collection 2.5 x 1.8 cm in right iliacus muscle., slightly smaller than before.  Cefepime + vanc were continue. Work up was broadened to include fungal etiology, and she is started on empiric started fluconazole. Repeat CT on 6/17  showed no significant interval change in the size of the right iliacus muscle fluid collections, with changes in the right iliac bone, bilateral sacrum, and left iliac bone suspicious for osteomyelitis, and changes at L5-S1 suspicious for discitis/osteomyelitis, along with hyperdense right gluteal lesion, moderately suspicious for postoperative pseudoaneurysm with adjacent hematoma. IR felt aspiration of the fluid will have low yield, and so recommended biopsy of the nonhealing right iliac fracture area instead. Biopsy of the hip showed no AFB, organism, or fungal elements. MRI of the brain showed innumerable supra and infratentorial enhancing nodules again noted, with multiple lesions appearing somewhat larger and with increased enhancement.      Interval Problem Update  DARYL overnight  Continued hip pain  No new complaints    Consultants/Specialty  Infectious disease  Orthopedic surgery  Neurosurgery, Dr. Nguyen  IR    Code Status  DNR/DNI    Disposition  Anticipate discharge to SNF once medically cleared. Accepted by [facility]    Review of Systems  Review of Systems   Constitutional: Negative for chills and fever.   Gastrointestinal: Negative for abdominal pain and nausea.   Musculoskeletal:        Hip pain   Neurological: Negative for dizziness and headaches.        Physical Exam  Temp:  [36.2 °C (97.2 °F)-37 °C (98.6 °F)] 36.7 °C (98.1 °F)  Pulse:  [54-83] 83  Resp:  [15-21] 21  BP: (110-140)/(49-83) 140/68  SpO2:  [91 %-94 %] 92 %    Physical Exam   Constitutional: She appears well-developed and well-nourished.   HENT:   Head: Normocephalic and atraumatic.   Eyes: Conjunctivae are normal. Right eye exhibits no discharge. Left eye exhibits no discharge.   Pulmonary/Chest: Effort normal. No respiratory distress.   Abdominal: Soft. She exhibits no distension. There is no tenderness.   Musculoskeletal: She exhibits no edema or tenderness.   Neurological: She is alert.   Skin: Skin is warm and dry.   Nursing note  and vitals reviewed.      Fluids    Intake/Output Summary (Last 24 hours) at 06/26/19 1753  Last data filed at 06/26/19 1330   Gross per 24 hour   Intake              368 ml   Output                0 ml   Net              368 ml       Laboratory      Recent Labs      06/26/19   0041   SODIUM  128*   POTASSIUM  4.6   CHLORIDE  96   CO2  25   GLUCOSE  93   BUN  15   CREATININE  0.66   CALCIUM  11.7*                   Imaging    Assessment/Plan  * Abscess of right iliac muscle and right gluteus medius muscle- (present on admission)   Assessment & Plan    - Recurrent issue since sacral fracture repair in December 2018. Has had multiple IR drainage and antibiotics. Recent cultures remain unrevealing.    - s/p biopsy of the nonhealing right iliac fracture area. Showed no AFB, organism, or fungal elements.  Cultures negative.  I discussed case with ID and will hold all abx at this time in hopes of more successful brain biopsy culture results     Brain lesion- (present on admission)   Assessment & Plan    -Repeat brain MRI after 2 weeks still shows innumerable supra and infratentorial enhancing nodules again noted, with multiple lesions appearing somewhat larger and with increased enhancement, despite antibiotics.  Plan  for right Stealth guided open biopsy of right superficial T/P lesion on 6/28     Pseudoaneurysm following procedure ([facility])- (present on admission)   Assessment & Plan    -Likely postoperative.  No further interventions needed.  Monitor.     Sepsis ([facility])   Assessment & Plan    -This is sepsis (without associated acute organ dysfunction).  Likely from her abscesses.  Blood cultures negative.  -Sepsis has resolved.  Hold antibiotics with cefepime, Vancomycin, Diflucan as per ID.  Further work-up as above.  -Continue close hemodynamic monitoring.  Watch for fevers.     Hypomagnesemia- (present on admission)   Assessment & Plan    -Better again today after IV replacement.   -Continue BID PO magnesium oxide.  Repeat magnesium level in AM.     Hypercalcemia- (present on admission)   Assessment & Plan    -Resolved.     Acquired circulating anticoagulants (HCC)- (present on admission)   Assessment & Plan    -I will hold her Xarelto for need for stereotactic brain biopsy.     Non-severe protein-calorie malnutrition (HCC)- (present on admission)   Assessment & Plan    -Moderate.  -Continue nutrition support per RD.       History of DVT (deep vein thrombosis)- (present on admission)   Assessment & Plan    -holding xarelto for brain biopsy     Essential hypertension- (present on admission)   Assessment & Plan    -maintaining good control.  Continue Lopressor, Cozaar, and Norvasc.  Monitor blood pressure trend closely.     Chronic hyponatremia- (present on admission)   Assessment & Plan    - remains stable. Continue daily monitoring.      RLS (restless legs syndrome)- (present on admission)   Assessment & Plan    - Continue on pramipexole.      Chronic pain- (present on admission)   Assessment & Plan    - well controlled. Primarily located at left hip.  -Continue as needed oxycodone, Flexeril, and Tylenol.     History of breast cancer- (present on admission)   Assessment & Plan    -S/p left mastectomy and breast implant.  -Needs follow-up as outpatient.     COPD (chronic obstructive pulmonary disease) (HCC)- (present on admission)   Assessment & Plan    - Not on any exacerbation at this time, continue RT protocol.          VTE prophylaxis: SCDs    I have seen and examined patient on 6/26/2019. I have reviewed vitals, new labs and imaging. I have discussed POC with RN. There are no changes from (6/25/2019) except for what is mentioned above.

## 2019-06-27 NOTE — THERAPY
"Pt demonstrating decreased functioanl mobility due to increased LE pain, despite recieving pain medication prior to session. Pt able to stand w/walker and take a few steps to and from bedside chair. Pt would benefit from further acute PT txs to progress towards goals and independence. Recommend post acute placement.    Physical Therapy Treatment completed.   Bed Mobility:  Supine to Sit: Minimal Assist  Transfers: Sit to Stand: Minimal Assist  Gait: Level Of Assist: Minimal Assist with Front-Wheel Walker       Plan of Care: Will benefit from Physical Therapy 3 times per week    See \"Rehab Therapy-Acute\" Patient Summary Report for complete documentation.       "

## 2019-06-27 NOTE — PROGRESS NOTES
"Received alert and oriented x 2-3. Check vitals sign and recorded accordingly and due med given per MAR. Monitor sign and symptoms of respiratory distress and treatment given accordingly per MAR.Medicated per MAR and reassessed every 2 hours per protocol. Call light within reach. Bed alarm in placed. Needs attended. Will continue to monitor./69   Pulse 88   Temp 37.1 °C (98.8 °F) (Temporal)   Resp 18   Ht 1.575 m (5' 2\")   Wt 57.1 kg (125 lb 14.1 oz)   SpO2 90%   Breastfeeding? No   BMI 23.02 kg/m² .  "

## 2019-06-27 NOTE — PROGRESS NOTES
Hospital Medicine Daily Progress Note    Date of Service  6/27/2019    Chief Complaint  persistent abscess.    Hospital Course     Ms. Martini is a 68-year-old female who was transferred from [facility] on 5/29/2019 with persistent right gluteal abscesses since sacral fracture repair in December.  She has had multiple IR drainage.  She initially presented with pelvic pain, low back pain, and confusion. She also had new slurred speech. Imaging of the abdomen, pelvis and brain revealed abscesses and she was treated with a full course of IV antibiotics per ID.  Her slurred speech improved and she was able to ambulate with physical therapy. However, repeat imaging showed persistent pelvic abscesses.  MRI brain shows increase in brain lesions.  Her brain lesions are known from prior imaging and there is concern they represent metastasis.  Oncology aware and do not recommend further imaging. Cultures remained negative and a CHAS done by Dr. Potter showed no vegetations. The size of the pelvic abscesses was reviewed by interventional radiology and the case was discussed with Dr. Finley who recommended transfer to [facility] for CT guided drainage. She underwent drainage on 5/30. On 6/4, she became febrile again, so restarted on cefepime and vancomycin per ID and has been on them since.  Right hip hardware was removed on 6/5. Repeat MRI brain on 6/7/19 saw progression of the supra and infratentorial intra-axial nodules with edema, prompting a Neurosurgery consult.  Dr. Nguyen stated lesions are not amendable to biopsy stereotactically, and favors infectious etiology, recommended continued antibiotics, antineuroleptic and repeat brain MRI in 2 weeks (around 6/21).  Repeat CT pelvis on 6/9 showed residual fluid collection 2.5 x 1.8 cm in right iliacus muscle., slightly smaller than before.  Cefepime + vanc were continue. Work up was broadened to include fungal etiology, and she is started on empiric started fluconazole. Repeat CT on 6/17  showed no significant interval change in the size of the right iliacus muscle fluid collections, with changes in the right iliac bone, bilateral sacrum, and left iliac bone suspicious for osteomyelitis, and changes at L5-S1 suspicious for discitis/osteomyelitis, along with hyperdense right gluteal lesion, moderately suspicious for postoperative pseudoaneurysm with adjacent hematoma. IR felt aspiration of the fluid will have low yield, and so recommended biopsy of the nonhealing right iliac fracture area instead. Biopsy of the hip showed no AFB, organism, or fungal elements. MRI of the brain showed innumerable supra and infratentorial enhancing nodules again noted, with multiple lesions appearing somewhat larger and with increased enhancement.      Interval Problem Update  DARYL overnight  Having severe pain all over which she says is worse today  I discussed with her this may be due to her antibiotics being held  I have started her on oxycontin for more prolonged pain management  She is moving her legs a lot and states this is to distract her from her pain     Consultants/Specialty  Infectious disease  Orthopedic surgery  Neurosurgery, Dr. Nguyen  IR    Code Status  DNR/DNI    Disposition  Anticipate discharge to SNF once medically cleared. Accepted by [facility]    Review of Systems  Review of Systems   Constitutional: Negative for chills and fever.   Gastrointestinal: Negative for abdominal pain and nausea.   Musculoskeletal:        Low back pain and b/l hip pain    Neurological: Negative for dizziness and headaches.        Physical Exam  Temp:  [35.8 °C (96.5 °F)-37.1 °C (98.8 °F)] 36.2 °C (97.2 °F)  Pulse:  [58-88] 79  Resp:  [16-18] 16  BP: (124-142)/(49-71) 127/71  SpO2:  [90 %-98 %] 93 %    Physical Exam   Constitutional: She appears distressed (due to pain).   Moving legs frequently d/t pain, tearful   HENT:   Head: Normocephalic and atraumatic.   Eyes: Conjunctivae are normal. Right eye exhibits no  discharge. Left eye exhibits no discharge.   Pulmonary/Chest: Effort normal. No respiratory distress.   Abdominal: Soft. She exhibits no distension. There is no tenderness.   Musculoskeletal: She exhibits no edema or tenderness.   Tenderness over lower back and hips. Protuberant soft mass above right hip   Neurological: She is alert.   Skin: Skin is warm and dry.   Nursing note and vitals reviewed.      Fluids    Intake/Output Summary (Last 24 hours) at 06/27/19 1615  Last data filed at 06/27/19 1100   Gross per 24 hour   Intake              240 ml   Output              450 ml   Net             -210 ml       Laboratory  Recent Labs      06/27/19   1123   WBC  6.6   RBC  4.75   HEMOGLOBIN  13.4   HEMATOCRIT  42.4   MCV  89.3   MCH  28.2   MCHC  31.6*   RDW  56.4*   PLATELETCT  369   MPV  9.4     Recent Labs      06/26/19   0041  06/27/19   1123   SODIUM  128*  132*   POTASSIUM  4.6  4.9   CHLORIDE  96  99   CO2  25  25   GLUCOSE  93  85   BUN  15  17   CREATININE  0.66  0.65   CALCIUM  11.7*  12.3*     Recent Labs      06/27/19   1123   APTT  32.5   INR  1.02               Imaging    Assessment/Plan  * Abscess of right iliac muscle and right gluteus medius muscle- (present on admission)   Assessment & Plan    - Recurrent issue since sacral fracture repair in December 2018. Has had multiple IR drainage and antibiotics. Recent cultures remain unrevealing.    - s/p biopsy of the nonhealing right iliac fracture area. Showed no AFB, organism, or fungal elements.  Cultures negative.  I discussed case with ID and will hold all abx at this time in hopes of more successful brain biopsy culture results  Worsening pain. I Started patient on oxycontin and gabapentin      Brain lesion- (present on admission)   Assessment & Plan    -Repeat brain MRI after 2 weeks still shows innumerable supra and infratentorial enhancing nodules again noted, with multiple lesions appearing somewhat larger and with increased enhancement, despite  antibiotics.  Plan  for right Stealth guided open biopsy of right superficial T/P lesion on 6/28     Pseudoaneurysm following procedure (HCC)- (present on admission)   Assessment & Plan    -Likely postoperative.  No further interventions needed.  Monitor.     Sepsis (HCC)   Assessment & Plan    -This is sepsis (without associated acute organ dysfunction).  Likely from her abscesses.  Blood cultures negative.  -Sepsis has resolved.  Hold antibiotics with cefepime, Vancomycin, Diflucan as per ID.  Further work-up as above.  -Continue close hemodynamic monitoring.  Watch for fevers.     Hypomagnesemia- (present on admission)   Assessment & Plan    -Better again today after IV replacement.   -Continue BID PO magnesium oxide. Repeat magnesium level in AM.     Hypercalcemia- (present on admission)   Assessment & Plan    -Resolved.     Acquired circulating anticoagulants (HCC)- (present on admission)   Assessment & Plan    -I will hold her Xarelto for need for stereotactic brain biopsy.     Non-severe protein-calorie malnutrition (HCC)- (present on admission)   Assessment & Plan    -Moderate.  -Continue nutrition support per RD.       History of DVT (deep vein thrombosis)- (present on admission)   Assessment & Plan    -holding xarelto for brain biopsy     Essential hypertension- (present on admission)   Assessment & Plan    -maintaining good control.  Continue Lopressor, Cozaar, and Norvasc.  Monitor blood pressure trend closely.     Chronic hyponatremia- (present on admission)   Assessment & Plan    - remains stable. Continue daily monitoring.      RLS (restless legs syndrome)- (present on admission)   Assessment & Plan    - Continue on pramipexole.      Chronic pain- (present on admission)   Assessment & Plan    - well controlled. Primarily located at left hip.  -Continue as needed oxycodone, Flexeril, and Tylenol.     History of breast cancer- (present on admission)   Assessment & Plan    -S/p left mastectomy and breast  implant.  -Needs follow-up as outpatient.     COPD (chronic obstructive pulmonary disease) (HCC)- (present on admission)   Assessment & Plan    - Not on any exacerbation at this time, continue RT protocol.          VTE prophylaxis: SCDs    I have seen and examined patient on 6/27/2019. I have reviewed vitals, new labs and imaging. I have discussed POC with RN. There are no changes from (6/26/2019) except for what is mentioned above.

## 2019-06-27 NOTE — PROGRESS NOTES
Infectious Disease Progress Note    Author: Beatriz Johnson M.D. Date & Time of service: 6/27/2019  1:01 PM    Chief Complaint:  Brain abscess, iliopsoas abscess, leukopenia      Interval History:  68 y.o. female admitted 5/29/2019 as a transfer from [facility] since 4/30/2019. + diabetes, SANTOSH of right hip with hardware, and breast cancer who was originally admitted to [facility] on 03/08/2019 for right hip and pelvic pain.  Work-up revealed a right iliopsoas lesion, gluteal abscess, and mult brain abscesses  6/1/2019-no fevers.  Continues to complain of significant pain in her hips.  Pain medications are being adjusted.  6/2/2019-no fevers.  Continues to remain off the antibiotics.  Awaiting Ortho evaluation  6/4/2019 patient febrile up to 103.  Having shaking chills.  Is not feeling well.  Denies any specific complaints.  6/10 AF WBC 5.3 somnolent No fever or new complaints  6/11 AF WBC 9 No fever or chills-some pain at surgical site. Limited participation with PT noted  6/12 AF WBC 7.3 somnolent but arousable-no new complaints  6/13 afebrile WBC 9.1 patient complaining of left hip pain.  Denies any headaches  6/14 afebrile, no CBC today.  Reports no new issues, tolerating antibiotics.  States the left hip pain is unchanged.  6/18 afebrile, WBC 6.9, HR 80s, -150s, on room air. Repeat CT on 6/17 showed no change in right iliac fluid collection, changes in right iliac/bilateral sacrum and left iliac suspicious of OM.   6/20- no fevers. C/o hip pain.   6/21/2019 no fevers.  Complains of some swelling at the right hip  6/22/2019-continues to complain of swelling of the right hip.  No fevers.  6/23/2019-no fevers.  The swelling is improved.  The ultrasound did not show any abscess or fluid collection.  6/24/2019 no fevers.  The hip pain is better.  No cough no shortness of breath  6/25/2019-no fevers.  The hip cultures remain negative.  She is scheduled for stereotactic biopsy on Friday.  6/26/2019 no fevers are  noted.  Patient complains of back pain.  In significant distress.  2019-no fevers.  Continues to complain of pain.  No new issues overnight  Labs Reviewed, Medications Reviewed, and Wound Reviewed.    Review of Systems:  Review of Systems   Constitutional: Negative for chills and fever.   Respiratory: Negative for cough and shortness of breath.    Gastrointestinal: Negative for abdominal pain, diarrhea, nausea and vomiting.   Musculoskeletal: Positive for back pain and joint pain.        Left hip   Neurological: Negative for dizziness and headaches.   All other systems reviewed and are negative.  Unchanged    Hemodynamics:  Temp (24hrs), Av.7 °C (98 °F), Min:35.8 °C (96.5 °F), Max:37.1 °C (98.8 °F)  Temperature: 35.8 °C (96.5 °F)  Pulse  Av.5  Min: 54  Max: 125  Blood Pressure : 124/49       Physical Exam:  Physical Exam   Constitutional: She appears distressed.   Distress due to pain   HENT:   Mouth/Throat: Oropharynx is clear and moist. No oropharyngeal exudate.   Eyes: Conjunctivae are normal. No scleral icterus.   Neck: Neck supple.   Cardiovascular: Normal rate, regular rhythm and normal heart sounds.    No murmur heard.  Pulmonary/Chest: Effort normal and breath sounds normal. She has no wheezes. She has no rales.   Abdominal: Soft. She exhibits no distension. There is tenderness. There is no rebound.   Musculoskeletal: She exhibits tenderness. She exhibits no edema.   Right hip incision site has some swelling and induration.  No erythema   Neurological: She is alert.   No gross focal neuro deficit   Skin: Skin is warm. No rash noted. She is not diaphoretic. No erythema.   Psychiatric: She has a normal mood and affect.   Very pleasant   Nursing note and vitals reviewed.      Meds:    Current Facility-Administered Medications:   •  oxyCODONE CR  •  gabapentin  •  LORazepam  •  magnesium oxide  •  morphine injection  •  lactobacillus rhamnosus  •  potassium chloride SA  •  oxyCODONE  immediate-release  •  sucralfate  •  Pharmacy Consult Request  •  acetaminophen  •  [DISCONTINUED] insulin regular **AND** [CANCELED] Accu-Chek ACHS **AND** NOTIFY MD and PharmD **AND** glucose **AND** dextrose 10% bolus  •  NS  •  pramipexole  •  lidocaine  •  temazepam  •  cyclobenzaprine  •  Respiratory Care per Protocol  •  amLODIPine  •  cinacalcet  •  losartan  •  acetaminophen  •  atorvastatin  •  omeprazole  •  metoprolol  •  senna-docusate **AND** polyethylene glycol/lytes **AND** magnesium hydroxide **AND** bisacodyl  •  ondansetron  •  ondansetron    Labs:  Recent Labs      06/27/19   1123   WBC  6.6   RBC  4.75   HEMOGLOBIN  13.4   HEMATOCRIT  42.4   MCV  89.3   MCH  28.2   RDW  56.4*   PLATELETCT  369   MPV  9.4   NEUTSPOLYS  69.50   LYMPHOCYTES  15.70*   MONOCYTES  10.40   EOSINOPHILS  3.30   BASOPHILS  0.80     Recent Labs      06/26/19   0041  06/27/19   1123   SODIUM  128*  132*   POTASSIUM  4.6  4.9   CHLORIDE  96  99   CO2  25  25   GLUCOSE  93  85   BUN  15  17     Recent Labs      06/26/19   0041  06/27/19   1123   CREATININE  0.66  0.65       Imaging:  MRI of the brain on 6/8/2019  Impression       1.  Interval progression of supra and infratentorial intra-axial nodules and associated edema. This short-term interval progression favors infection over neoplasm though both remain considerations.  2.  Mild atrophy         Micro:  Results     Procedure Component Value Units Date/Time    AFB Culture [[id number]] Collected:  06/19/19 0930    Order Status:  Completed Specimen:  Tissue from Other Body Fluid Updated:  06/25/19 0918     Significant Indicator NEG     Source TISS     Site Right Hip deep bone     Culture Result Culture in progress.     AFB Smear Results No acid fast bacilli seen.    Narrative:       Collected By:[id number] BEATRICE HARRIS  Bone biopsy right hip  Collected By:[id number] BEATRICE HARRIS  Right hip fluid collection  Collected By:[id number] BEATRICE HARRIS    Fungal Smear [[id number]] Collected:   06/19/19 0930    Order Status:  Completed Specimen:  Tissue from Other Body Fluid Updated:  06/25/19 0918     Significant Indicator NEG     Source TISS     Site Right Hip deep bone     Fungal Smear Results No fungal elements seen.    Narrative:       Collected By:[id number] BEATRICE HARRIS  Bone biopsy right hip  Collected By:[id number] BEATRICE HARRIS  Right hip fluid collection  Collected By:[id number] BEATRICE HARRIS    Fungal Culture [[id number]] Collected:  06/19/19 0930    Order Status:  Completed Specimen:  Tissue Updated:  06/25/19 0918     Significant Indicator NEG     Source TISS     Site Right Hip deep bone     Culture Result No fungal growth to date.    Narrative:       Collected By:[id number] BEATRICE HARRIS  Bone biopsy right hip  Collected By:[id number] BEATRICE HARRIS  Right hip fluid collection  Collected By:[id number] BEATRICE HARRIS    CULTURE TISSUE W/ GRM STAIN [[id number]] Collected:  06/19/19 0930    Order Status:  Completed Specimen:  Tissue Updated:  06/25/19 0918     Significant Indicator NEG     Source TISS     Site Right Hip deep bone     Culture Result No growth at 72 hours.     Gram Stain Result No organisms seen.    Narrative:       Collected By:[id number] BEATRICE HARRIS  Bone biopsy right hip  Collected By:[id number] BEATRICE HARRIS  Right hip fluid collection  Collected By:[id number] BEATRICE HARRIS          Assessment:  69 yo female with:  Gluteal and iliopsoas abscess  Brain abscesses vs mets  Breast cancer  DM2    Plan:  Brain abscesses vs mets.  Persistent with interval progression on last MRI  Patient has received prolonged antibiotics  Now the antibiotics have been discontinued since patient is going to get a stereotactic biopsy     Latent TB  As CNS lesions previously improved on nontuberculosis treatment, continuing as above  Repeat MRI in 6/8 with interval progression       Fever of unknown origin.  Resolved  Fever and chills 6/4  Cultures negative  Now afebrile  QuantiGold+. CXR neg    Gluteal and iliopsoas abscess s/p  "treatment.   Adjacent to hardware in right hip (placed 12/17/18)  CT 4/23 \"Fluid collections within the right gluteal and iliacis muscles.. The collection within the right gluteal muscle has unchanged while the fluid collection within the right iliacis muscle is increased somewhat in size.\" 2.7 cm  Cultures 3/29 and 4/4 neg  MRI on 5/20/2019 - interval decrease in collection in R gluteal muscle and persistent multiloculated collection in R iliacus muscle.   CT 5/28 no change  s/p drainage on 5/30/2019-cultures negative  S/p removal hardware 6/5-no cultures done  Panculture neg  1, 3 beta glucan neg  S/p removal hardware 6/5/19  CT on 6/8 with residual abscess in the right iliacus muscle, 2.5 x 1.8 cm, slightly smaller than prior  Had CT-guided deep bone biopsy on 6/19/2019.  Those cultures remain negative but the pathology is not available yet    Discitis osteomyelitis  Changes at L5-S1 suspicious for discitis osteo-mellitus on the CT scan done on 6/17/2019  Get MRI of the LS spine  I would recommend getting a PET scan    Type 2 DM  Hemoglobin A1c 6.3   Keep -140s    Discussed with Dr. Wells, Hospital Medicine    ID will follow.  Please call with questions.            "

## 2019-06-27 NOTE — PROGRESS NOTES
Neurosurgery Progress Note    Subjective:  No events    Exam:    A&O x3  PERRL 3mm  Face symm, tongue midline  HERNANDEZ with FS grossly        BP  Min: 124/49  Max: 142/69  Pulse  Av.3  Min: 58  Max: 88  Resp  Av.5  Min: 17  Max: 21  Temp  Av.7 °C (98 °F)  Min: 35.8 °C (96.5 °F)  Max: 37.1 °C (98.8 °F)  SpO2  Av %  Min: 90 %  Max: 98 %    No Data Recorded        Recent Labs      19   0041   SODIUM  128*   POTASSIUM  4.6   CHLORIDE  96   CO2  25   GLUCOSE  93   BUN  15   CREATININE  0.66   CALCIUM  11.7*               Intake/Output       19 0700 - 19 0659 19 07 - 19 0659      [id number] 1596-8788 Total [id number] 7070-7314 Total       Intake    P.O.  118  -- 118  --  -- --    P.O. 118 -- 118 -- -- --    Total Intake 118 -- 118 -- -- --       Output    Urine  --  -- --  450  -- 450    Number of Times Voided -- -- -- 2 x -- 2 x    Urine Void (mL) -- -- -- 450 -- 450    Total Output -- -- -- 450 -- 450       Net I/O     118 -- 118 -450 -- -450            Intake/Output Summary (Last 24 hours) at 19 1054  Last data filed at 19 0800   Gross per 24 hour   Intake              118 ml   Output              450 ml   Net             -332 ml            • LORazepam  1 mg QHS PRN   • magnesium oxide  400 mg BID   • morphine injection  2 mg Q2HRS PRN   • lactobacillus rhamnosus  1 Cap QDAY with Breakfast   • potassium chloride SA  40 mEq BID   • oxyCODONE immediate-release  5-10 mg Q4HRS PRN   • sucralfate  1 g Q6HRS   • Pharmacy Consult Request  1 Each PHARMACY TO DOSE   • acetaminophen  1,000 mg BID   • glucose  16 g Q15 MIN PRN    And   • dextrose 10% bolus  250 mL Q15 MIN PRN   • NS  500 mL Once PRN   • pramipexole  1 mg TID   • lidocaine  2 Patch Q24HR   • temazepam  15 mg QHS PRN   • cyclobenzaprine  10 mg TID PRN   • Respiratory Care per Protocol   Continuous RT   • amLODIPine  10 mg Q DAY   • cinacalcet  60 mg DAILY   • losartan  50 mg Q DAY   • acetaminophen  650 mg  Q6HRS PRN   • atorvastatin  10 mg Q EVENING   • omeprazole  20 mg DAILY   • metoprolol  25 mg TWICE DAILY   • senna-docusate  2 Tab BID    And   • polyethylene glycol/lytes  1 Packet QDAY PRN    And   • magnesium hydroxide  30 mL QDAY PRN    And   • bisacodyl  10 mg QDAY PRN   • ondansetron  4 mg Q4HRS PRN   • ondansetron  4 mg Q4HRS PRN       Assessment and Plan:  Hospital day #267 multiple cerebral mets v infection  Enlargement despite abx over past month  OR this Friday for stealth-guided right parietal open biopsy  Na 128 - management  Per IM  NPO at midnight tonight  Stealth mri tomorrow 0600, surgery ~9 am  Will order updated labs, ekg

## 2019-06-27 NOTE — THERAPY
Occupational Therapy Contact Note    Attempted OT treatment. Pt declining, reporting she was up already today and now she is in a lot of pain. Will attempt later as able.    Marlen Wilks, OTR/L

## 2019-06-27 NOTE — PROGRESS NOTES
· 2 RN skin check complete with PATRICK Andrews.   · Devices in place: PIV.  · Skin assessed under devices: YES.  · Confirmed pressure ulcers found on: N/A.  · New potential pressure ulcers noted on: N/A. Wound consult placed? N/A. Photo uploaded? N/A.   · The following interventions are in place: patient turns self, incontinence care, ENRRIQUE applied PRN and pillows in use for support & positioning.

## 2019-06-27 NOTE — CARE PLAN
Problem: Safety  Goal: Will remain free from falls    Intervention: Implement fall precautions   06/27/19 0033   OTHER   Environmental Precautions Treaded Slipper Socks on Patient;Personal Belongings, Wastebasket, Call Bell etc. in Easy Reach;Transferred to Stronger Side;Report Given to Other Health Care Providers Regarding Fall Risk;Bed in Low Position;Communication Sign for Patients & Families   Chair/Bed Strip Alarm Yes - Alarm On         Problem: Discharge Barriers/Planning  Goal: Patient's continuum of care needs will be met    Intervention: Collaborate with Transitional Care Team and Interdisciplinary Team to meet discharge needs  Possible discharge to SNF when medically clear.

## 2019-06-28 ENCOUNTER — APPOINTMENT (OUTPATIENT)
Dept: RADIOLOGY | Facility: MEDICAL CENTER | Age: 69
DRG: 356 | End: 2019-06-28
Attending: INTERNAL MEDICINE
Payer: MEDICARE

## 2019-06-28 ENCOUNTER — APPOINTMENT (OUTPATIENT)
Dept: RADIOLOGY | Facility: MEDICAL CENTER | Age: 69
DRG: 356 | End: 2019-06-28
Attending: PHYSICIAN ASSISTANT
Payer: MEDICARE

## 2019-06-28 ENCOUNTER — ANESTHESIA EVENT (OUTPATIENT)
Dept: SURGERY | Facility: MEDICAL CENTER | Age: 69
DRG: 356 | End: 2019-06-28
Payer: MEDICARE

## 2019-06-28 ENCOUNTER — ANESTHESIA (OUTPATIENT)
Dept: SURGERY | Facility: MEDICAL CENTER | Age: 69
DRG: 356 | End: 2019-06-28
Payer: MEDICARE

## 2019-06-28 LAB
ALBUMIN SERPL BCP-MCNC: 3.6 G/DL (ref 3.2–4.9)
ALBUMIN/GLOB SERPL: 0.9 G/DL
ALP SERPL-CCNC: 343 U/L (ref 30–99)
ALT SERPL-CCNC: 9 U/L (ref 2–50)
ANION GAP SERPL CALC-SCNC: 10 MMOL/L (ref 0–11.9)
AST SERPL-CCNC: 28 U/L (ref 12–45)
BASOPHILS # BLD AUTO: 1.8 % (ref 0–1.8)
BASOPHILS # BLD: 0.03 K/UL (ref 0–0.12)
BILIRUB SERPL-MCNC: 0.4 MG/DL (ref 0.1–1.5)
BUN SERPL-MCNC: 25 MG/DL (ref 8–22)
CALCIUM SERPL-MCNC: 12.4 MG/DL (ref 8.5–10.5)
CHLORIDE SERPL-SCNC: 101 MMOL/L (ref 96–112)
CO2 SERPL-SCNC: 21 MMOL/L (ref 20–33)
CREAT SERPL-MCNC: 0.9 MG/DL (ref 0.5–1.4)
EOSINOPHIL # BLD AUTO: 0.03 K/UL (ref 0–0.51)
EOSINOPHIL NFR BLD: 1.8 % (ref 0–6.9)
ERYTHROCYTE [DISTWIDTH] IN BLOOD BY AUTOMATED COUNT: 54.1 FL (ref 35.9–50)
GLOBULIN SER CALC-MCNC: 4.1 G/DL (ref 1.9–3.5)
GLUCOSE SERPL-MCNC: 123 MG/DL (ref 65–99)
GRAM STN SPEC: NORMAL
HCT VFR BLD AUTO: 37.8 % (ref 37–47)
HGB BLD-MCNC: 12.7 G/DL (ref 12–16)
LYMPHOCYTES # BLD AUTO: 0.31 K/UL (ref 1–4.8)
LYMPHOCYTES NFR BLD: 20.7 % (ref 22–41)
MAGNESIUM SERPL-MCNC: 1.7 MG/DL (ref 1.5–2.5)
MANUAL DIFF BLD: ABNORMAL
MCH RBC QN AUTO: 29.4 PG (ref 27–33)
MCHC RBC AUTO-ENTMCNC: 33.6 G/DL (ref 33.6–35)
MCV RBC AUTO: 87.5 FL (ref 81.4–97.8)
METAMYELOCYTES NFR BLD MANUAL: 0.9 %
MONOCYTES # BLD AUTO: 0.04 K/UL (ref 0–0.85)
MONOCYTES NFR BLD AUTO: 2.7 % (ref 0–13.4)
NEUTROPHILS # BLD AUTO: 1.05 K/UL (ref 2–7.15)
NEUTROPHILS NFR BLD: 70.3 % (ref 44–72)
NEUTS BAND NFR BLD MANUAL: 1.8 % (ref 0–10)
PATHOLOGY CONSULT NOTE: NORMAL
PHOSPHATE SERPL-MCNC: 3.9 MG/DL (ref 2.5–4.5)
PLATELET # BLD AUTO: 221 K/UL (ref 164–446)
PMV BLD AUTO: 9.1 FL (ref 9–12.9)
POTASSIUM SERPL-SCNC: 4.5 MMOL/L (ref 3.6–5.5)
PROT SERPL-MCNC: 7.7 G/DL (ref 6–8.2)
RBC # BLD AUTO: 4.32 M/UL (ref 4.2–5.4)
SIGNIFICANT IND 70042: NORMAL
SITE SITE: NORMAL
SODIUM SERPL-SCNC: 132 MMOL/L (ref 135–145)
SOURCE SOURCE: NORMAL
WBC # BLD AUTO: 1.5 K/UL (ref 4.8–10.8)

## 2019-06-28 PROCEDURE — 700102 HCHG RX REV CODE 250 W/ 637 OVERRIDE(OP): Performed by: HOSPITALIST

## 2019-06-28 PROCEDURE — 500445 HCHG HEMOSTAT, SURGICEL 4X8: Performed by: NEUROLOGICAL SURGERY

## 2019-06-28 PROCEDURE — 369999 HCHG MISC LAB CHARGE

## 2019-06-28 PROCEDURE — 87591 N.GONORRHOEAE DNA AMP PROB: CPT

## 2019-06-28 PROCEDURE — 85007 BL SMEAR W/DIFF WBC COUNT: CPT

## 2019-06-28 PROCEDURE — 500859: Performed by: NEUROLOGICAL SURGERY

## 2019-06-28 PROCEDURE — 87102 FUNGUS ISOLATION CULTURE: CPT

## 2019-06-28 PROCEDURE — A9270 NON-COVERED ITEM OR SERVICE: HCPCS | Performed by: HOSPITALIST

## 2019-06-28 PROCEDURE — 99232 SBSQ HOSP IP/OBS MODERATE 35: CPT | Performed by: HOSPITALIST

## 2019-06-28 PROCEDURE — 85027 COMPLETE CBC AUTOMATED: CPT

## 2019-06-28 PROCEDURE — 700102 HCHG RX REV CODE 250 W/ 637 OVERRIDE(OP): Performed by: INTERNAL MEDICINE

## 2019-06-28 PROCEDURE — 700102 HCHG RX REV CODE 250 W/ 637 OVERRIDE(OP): Performed by: NURSE PRACTITIONER

## 2019-06-28 PROCEDURE — A9270 NON-COVERED ITEM OR SERVICE: HCPCS | Performed by: INTERNAL MEDICINE

## 2019-06-28 PROCEDURE — 700111 HCHG RX REV CODE 636 W/ 250 OVERRIDE (IP): Performed by: HOSPITALIST

## 2019-06-28 PROCEDURE — 500889 HCHG PACK, NEURO: Performed by: NEUROLOGICAL SURGERY

## 2019-06-28 PROCEDURE — A9270 NON-COVERED ITEM OR SERVICE: HCPCS | Performed by: NURSE PRACTITIONER

## 2019-06-28 PROCEDURE — 87116 MYCOBACTERIA CULTURE: CPT

## 2019-06-28 PROCEDURE — 160028 HCHG SURGERY MINUTES - 1ST 30 MINS LEVEL 3: Performed by: NEUROLOGICAL SURGERY

## 2019-06-28 PROCEDURE — 88312 SPECIAL STAINS GROUP 1: CPT | Mod: 59

## 2019-06-28 PROCEDURE — 160009 HCHG ANES TIME/MIN: Performed by: NEUROLOGICAL SURGERY

## 2019-06-28 PROCEDURE — A9585 GADOBUTROL INJECTION: HCPCS | Performed by: PHYSICIAN ASSISTANT

## 2019-06-28 PROCEDURE — 160036 HCHG PACU - EA ADDL 30 MINS PHASE I: Performed by: NEUROLOGICAL SURGERY

## 2019-06-28 PROCEDURE — 700101 HCHG RX REV CODE 250: Performed by: ANESTHESIOLOGY

## 2019-06-28 PROCEDURE — 502240 HCHG MISC OR SUPPLY RC 0272: Performed by: NEUROLOGICAL SURGERY

## 2019-06-28 PROCEDURE — 70553 MRI BRAIN STEM W/O & W/DYE: CPT

## 2019-06-28 PROCEDURE — C1713 ANCHOR/SCREW BN/BN,TIS/BN: HCPCS | Performed by: NEUROLOGICAL SURGERY

## 2019-06-28 PROCEDURE — 700111 HCHG RX REV CODE 636 W/ 250 OVERRIDE (IP): Mod: JG | Performed by: INTERNAL MEDICINE

## 2019-06-28 PROCEDURE — 87556 M.TUBERCULO DNA AMP PROBE: CPT

## 2019-06-28 PROCEDURE — 700105 HCHG RX REV CODE 258: Performed by: ANESTHESIOLOGY

## 2019-06-28 PROCEDURE — 87551 MYCOBACTERIA DNA AMP PROBE: CPT

## 2019-06-28 PROCEDURE — 99233 SBSQ HOSP IP/OBS HIGH 50: CPT | Performed by: INTERNAL MEDICINE

## 2019-06-28 PROCEDURE — 87015 SPECIMEN INFECT AGNT CONCNTJ: CPT

## 2019-06-28 PROCEDURE — 700112 HCHG RX REV CODE 229: Performed by: NURSE PRACTITIONER

## 2019-06-28 PROCEDURE — 160048 HCHG OR STATISTICAL LEVEL 1-5: Performed by: NEUROLOGICAL SURGERY

## 2019-06-28 PROCEDURE — 500331 HCHG COTTONOID, SURG PATTIE: Performed by: NEUROLOGICAL SURGERY

## 2019-06-28 PROCEDURE — 88307 TISSUE EXAM BY PATHOLOGIST: CPT

## 2019-06-28 PROCEDURE — 99291 CRITICAL CARE FIRST HOUR: CPT | Performed by: INTERNAL MEDICINE

## 2019-06-28 PROCEDURE — 87491 CHLMYD TRACH DNA AMP PROBE: CPT | Mod: 91

## 2019-06-28 PROCEDURE — 88333 PATH CONSLTJ SURG CYTO XM 1: CPT

## 2019-06-28 PROCEDURE — 700101 HCHG RX REV CODE 250: Performed by: NEUROLOGICAL SURGERY

## 2019-06-28 PROCEDURE — 84100 ASSAY OF PHOSPHORUS: CPT

## 2019-06-28 PROCEDURE — 87206 SMEAR FLUORESCENT/ACID STAI: CPT

## 2019-06-28 PROCEDURE — 87075 CULTR BACTERIA EXCEPT BLOOD: CPT

## 2019-06-28 PROCEDURE — 700101 HCHG RX REV CODE 250: Performed by: NURSE PRACTITIONER

## 2019-06-28 PROCEDURE — 700111 HCHG RX REV CODE 636 W/ 250 OVERRIDE (IP): Performed by: ANESTHESIOLOGY

## 2019-06-28 PROCEDURE — 700111 HCHG RX REV CODE 636 W/ 250 OVERRIDE (IP): Performed by: INTERNAL MEDICINE

## 2019-06-28 PROCEDURE — 87801 DETECT AGNT MULT DNA AMPLI: CPT

## 2019-06-28 PROCEDURE — 36415 COLL VENOUS BLD VENIPUNCTURE: CPT

## 2019-06-28 PROCEDURE — 160039 HCHG SURGERY MINUTES - EA ADDL 1 MIN LEVEL 3: Performed by: NEUROLOGICAL SURGERY

## 2019-06-28 PROCEDURE — A6402 STERILE GAUZE <= 16 SQ IN: HCPCS | Performed by: NEUROLOGICAL SURGERY

## 2019-06-28 PROCEDURE — 501838 HCHG SUTURE GENERAL: Performed by: NEUROLOGICAL SURGERY

## 2019-06-28 PROCEDURE — 88334 PATH CONSLTJ SURG CYTO XM EA: CPT

## 2019-06-28 PROCEDURE — 87205 SMEAR GRAM STAIN: CPT

## 2019-06-28 PROCEDURE — A6404 STERILE GAUZE > 48 SQ IN: HCPCS | Performed by: NEUROLOGICAL SURGERY

## 2019-06-28 PROCEDURE — 72158 MRI LUMBAR SPINE W/O & W/DYE: CPT

## 2019-06-28 PROCEDURE — 700105 HCHG RX REV CODE 258: Performed by: INTERNAL MEDICINE

## 2019-06-28 PROCEDURE — 700117 HCHG RX CONTRAST REV CODE 255: Performed by: PHYSICIAN ASSISTANT

## 2019-06-28 PROCEDURE — 80053 COMPREHEN METABOLIC PANEL: CPT

## 2019-06-28 PROCEDURE — 8E09XBZ COMPUTER ASSISTED PROCEDURE OF HEAD AND NECK REGION: ICD-10-PCS | Performed by: NEUROLOGICAL SURGERY

## 2019-06-28 PROCEDURE — 700105 HCHG RX REV CODE 258: Performed by: NURSE PRACTITIONER

## 2019-06-28 PROCEDURE — 00B00ZX EXCISION OF BRAIN, OPEN APPROACH, DIAGNOSTIC: ICD-10-PCS | Performed by: NEUROLOGICAL SURGERY

## 2019-06-28 PROCEDURE — 770022 HCHG ROOM/CARE - ICU (200)

## 2019-06-28 PROCEDURE — 160035 HCHG PACU - 1ST 60 MINS PHASE I: Performed by: NEUROLOGICAL SURGERY

## 2019-06-28 PROCEDURE — 87070 CULTURE OTHR SPECIMN AEROBIC: CPT

## 2019-06-28 PROCEDURE — 160002 HCHG RECOVERY MINUTES (STAT): Performed by: NEUROLOGICAL SURGERY

## 2019-06-28 PROCEDURE — 700111 HCHG RX REV CODE 636 W/ 250 OVERRIDE (IP): Performed by: NURSE PRACTITIONER

## 2019-06-28 PROCEDURE — 83735 ASSAY OF MAGNESIUM: CPT

## 2019-06-28 DEVICE — PLATE NC DOGBONE 2-HOLE RIGID GOLD 0.6MM (6NCX4=24): Type: IMPLANTABLE DEVICE | Status: FUNCTIONAL

## 2019-06-28 DEVICE — PLATE NC BURR HOLE COVER 10MM (6NCX6=36): Type: IMPLANTABLE DEVICE | Status: FUNCTIONAL

## 2019-06-28 DEVICE — SCREW STRYK NC 1.5X5MM (6NCX40=240) (80EA/PK) CONSIGNED QTY 240 PRE-LOAD: Type: IMPLANTABLE DEVICE | Status: FUNCTIONAL

## 2019-06-28 RX ORDER — HYDRALAZINE HYDROCHLORIDE 20 MG/ML
5 INJECTION INTRAMUSCULAR; INTRAVENOUS
Status: DISCONTINUED | OUTPATIENT
Start: 2019-06-28 | End: 2019-06-28 | Stop reason: HOSPADM

## 2019-06-28 RX ORDER — METOPROLOL TARTRATE 1 MG/ML
1 INJECTION, SOLUTION INTRAVENOUS
Status: DISCONTINUED | OUTPATIENT
Start: 2019-06-28 | End: 2019-06-28 | Stop reason: HOSPADM

## 2019-06-28 RX ORDER — AMOXICILLIN 250 MG
1 CAPSULE ORAL
Status: DISCONTINUED | OUTPATIENT
Start: 2019-06-28 | End: 2019-06-30 | Stop reason: ALTCHOICE

## 2019-06-28 RX ORDER — MEPERIDINE HYDROCHLORIDE 25 MG/ML
12.5 INJECTION INTRAMUSCULAR; INTRAVENOUS; SUBCUTANEOUS
Status: DISCONTINUED | OUTPATIENT
Start: 2019-06-28 | End: 2019-06-28 | Stop reason: HOSPADM

## 2019-06-28 RX ORDER — BISACODYL 10 MG
10 SUPPOSITORY, RECTAL RECTAL
Status: DISCONTINUED | OUTPATIENT
Start: 2019-06-28 | End: 2019-06-30 | Stop reason: ALTCHOICE

## 2019-06-28 RX ORDER — SODIUM CHLORIDE 9 MG/ML
500 INJECTION, SOLUTION INTRAVENOUS EVERY 24 HOURS
Status: DISCONTINUED | OUTPATIENT
Start: 2019-06-28 | End: 2019-07-14

## 2019-06-28 RX ORDER — DEXAMETHASONE SODIUM PHOSPHATE 4 MG/ML
INJECTION, SOLUTION INTRA-ARTICULAR; INTRALESIONAL; INTRAMUSCULAR; INTRAVENOUS; SOFT TISSUE PRN
Status: DISCONTINUED | OUTPATIENT
Start: 2019-06-28 | End: 2019-06-28 | Stop reason: SURG

## 2019-06-28 RX ORDER — SODIUM CHLORIDE 9 MG/ML
INJECTION, SOLUTION INTRAVENOUS CONTINUOUS
Status: DISCONTINUED | OUTPATIENT
Start: 2019-06-28 | End: 2019-07-21

## 2019-06-28 RX ORDER — HYDROMORPHONE HYDROCHLORIDE 1 MG/ML
0.4 INJECTION, SOLUTION INTRAMUSCULAR; INTRAVENOUS; SUBCUTANEOUS
Status: DISCONTINUED | OUTPATIENT
Start: 2019-06-28 | End: 2019-06-28 | Stop reason: HOSPADM

## 2019-06-28 RX ORDER — GADOBUTROL 604.72 MG/ML
6 INJECTION INTRAVENOUS ONCE
Status: COMPLETED | OUTPATIENT
Start: 2019-06-28 | End: 2019-06-28

## 2019-06-28 RX ORDER — ONDANSETRON 2 MG/ML
4 INJECTION INTRAMUSCULAR; INTRAVENOUS
Status: DISCONTINUED | OUTPATIENT
Start: 2019-06-28 | End: 2019-06-28 | Stop reason: HOSPADM

## 2019-06-28 RX ORDER — AMOXICILLIN 250 MG
1 CAPSULE ORAL NIGHTLY
Status: DISCONTINUED | OUTPATIENT
Start: 2019-06-28 | End: 2019-07-12

## 2019-06-28 RX ORDER — HYDROMORPHONE HYDROCHLORIDE 1 MG/ML
0.1 INJECTION, SOLUTION INTRAMUSCULAR; INTRAVENOUS; SUBCUTANEOUS
Status: DISCONTINUED | OUTPATIENT
Start: 2019-06-28 | End: 2019-06-28 | Stop reason: HOSPADM

## 2019-06-28 RX ORDER — CALCITONIN SALMON 200 [IU]/.09ML
1 SPRAY, METERED NASAL ONCE
Status: DISCONTINUED | OUTPATIENT
Start: 2019-06-28 | End: 2019-06-28

## 2019-06-28 RX ORDER — ENEMA 19; 7 G/133ML; G/133ML
1 ENEMA RECTAL
Status: DISCONTINUED | OUTPATIENT
Start: 2019-06-28 | End: 2019-06-30 | Stop reason: ALTCHOICE

## 2019-06-28 RX ORDER — BUPIVACAINE HYDROCHLORIDE AND EPINEPHRINE 5; 5 MG/ML; UG/ML
INJECTION, SOLUTION EPIDURAL; INTRACAUDAL; PERINEURAL
Status: DISCONTINUED | OUTPATIENT
Start: 2019-06-28 | End: 2019-06-28 | Stop reason: HOSPADM

## 2019-06-28 RX ORDER — SODIUM CHLORIDE 9 MG/ML
250 INJECTION, SOLUTION INTRAVENOUS EVERY 24 HOURS
Status: DISCONTINUED | OUTPATIENT
Start: 2019-06-28 | End: 2019-07-14

## 2019-06-28 RX ORDER — DIAZEPAM 5 MG/ML
5 INJECTION, SOLUTION INTRAMUSCULAR; INTRAVENOUS ONCE
Status: DISCONTINUED | OUTPATIENT
Start: 2019-06-28 | End: 2019-06-28

## 2019-06-28 RX ORDER — OXYCODONE HCL 5 MG/5 ML
10 SOLUTION, ORAL ORAL
Status: DISCONTINUED | OUTPATIENT
Start: 2019-06-28 | End: 2019-06-28 | Stop reason: HOSPADM

## 2019-06-28 RX ORDER — OXYCODONE HCL 5 MG/5 ML
5 SOLUTION, ORAL ORAL
Status: DISCONTINUED | OUTPATIENT
Start: 2019-06-28 | End: 2019-06-28 | Stop reason: HOSPADM

## 2019-06-28 RX ORDER — DOCUSATE SODIUM 100 MG/1
100 CAPSULE, LIQUID FILLED ORAL 2 TIMES DAILY
Status: DISCONTINUED | OUTPATIENT
Start: 2019-06-28 | End: 2019-07-12

## 2019-06-28 RX ORDER — MIDAZOLAM HYDROCHLORIDE 1 MG/ML
1 INJECTION INTRAMUSCULAR; INTRAVENOUS
Status: DISCONTINUED | OUTPATIENT
Start: 2019-06-28 | End: 2019-06-28 | Stop reason: HOSPADM

## 2019-06-28 RX ORDER — DEXTROSE MONOHYDRATE 50 MG/ML
30 INJECTION, SOLUTION INTRAVENOUS EVERY 24 HOURS
Status: DISCONTINUED | OUTPATIENT
Start: 2019-06-28 | End: 2019-07-14

## 2019-06-28 RX ORDER — ACETAMINOPHEN 325 MG/1
650 TABLET ORAL EVERY 24 HOURS
Status: DISCONTINUED | OUTPATIENT
Start: 2019-06-28 | End: 2019-07-14

## 2019-06-28 RX ORDER — HYDRALAZINE HYDROCHLORIDE 20 MG/ML
10 INJECTION INTRAMUSCULAR; INTRAVENOUS
Status: DISCONTINUED | OUTPATIENT
Start: 2019-06-28 | End: 2019-07-30

## 2019-06-28 RX ORDER — POLYETHYLENE GLYCOL 3350 17 G/17G
1 POWDER, FOR SOLUTION ORAL 2 TIMES DAILY PRN
Status: DISCONTINUED | OUTPATIENT
Start: 2019-06-28 | End: 2019-06-30 | Stop reason: ALTCHOICE

## 2019-06-28 RX ORDER — CEFAZOLIN SODIUM 2 G/100ML
2 INJECTION, SOLUTION INTRAVENOUS EVERY 8 HOURS
Status: COMPLETED | OUTPATIENT
Start: 2019-06-28 | End: 2019-06-29

## 2019-06-28 RX ORDER — MAGNESIUM SULFATE HEPTAHYDRATE 40 MG/ML
INJECTION, SOLUTION INTRAVENOUS PRN
Status: DISCONTINUED | OUTPATIENT
Start: 2019-06-28 | End: 2019-06-28 | Stop reason: SURG

## 2019-06-28 RX ORDER — LORAZEPAM 2 MG/ML
1 INJECTION INTRAMUSCULAR ONCE
Status: DISCONTINUED | OUTPATIENT
Start: 2019-06-28 | End: 2019-06-28

## 2019-06-28 RX ORDER — DIPHENHYDRAMINE HCL 25 MG
25 TABLET ORAL EVERY 24 HOURS
Status: DISCONTINUED | OUTPATIENT
Start: 2019-06-28 | End: 2019-07-14

## 2019-06-28 RX ORDER — SODIUM CHLORIDE, SODIUM LACTATE, POTASSIUM CHLORIDE, CALCIUM CHLORIDE 600; 310; 30; 20 MG/100ML; MG/100ML; MG/100ML; MG/100ML
INJECTION, SOLUTION INTRAVENOUS
Status: DISCONTINUED | OUTPATIENT
Start: 2019-06-28 | End: 2019-06-28 | Stop reason: SURG

## 2019-06-28 RX ORDER — LABETALOL HYDROCHLORIDE 5 MG/ML
10 INJECTION, SOLUTION INTRAVENOUS
Status: DISCONTINUED | OUTPATIENT
Start: 2019-06-28 | End: 2019-08-10 | Stop reason: HOSPADM

## 2019-06-28 RX ORDER — HYDROMORPHONE HYDROCHLORIDE 1 MG/ML
0.2 INJECTION, SOLUTION INTRAMUSCULAR; INTRAVENOUS; SUBCUTANEOUS
Status: DISCONTINUED | OUTPATIENT
Start: 2019-06-28 | End: 2019-06-28 | Stop reason: HOSPADM

## 2019-06-28 RX ORDER — SODIUM CHLORIDE AND POTASSIUM CHLORIDE 150; 900 MG/100ML; MG/100ML
INJECTION, SOLUTION INTRAVENOUS CONTINUOUS
Status: DISCONTINUED | OUTPATIENT
Start: 2019-06-28 | End: 2019-06-28

## 2019-06-28 RX ORDER — SODIUM CHLORIDE, SODIUM LACTATE, POTASSIUM CHLORIDE, CALCIUM CHLORIDE 600; 310; 30; 20 MG/100ML; MG/100ML; MG/100ML; MG/100ML
INJECTION, SOLUTION INTRAVENOUS CONTINUOUS
Status: DISCONTINUED | OUTPATIENT
Start: 2019-06-28 | End: 2019-06-28 | Stop reason: HOSPADM

## 2019-06-28 RX ORDER — HALOPERIDOL 5 MG/ML
1 INJECTION INTRAMUSCULAR
Status: DISCONTINUED | OUTPATIENT
Start: 2019-06-28 | End: 2019-06-28 | Stop reason: HOSPADM

## 2019-06-28 RX ADMIN — POLYETHYLENE GLYCOL 3350 1 PACKET: 17 POWDER, FOR SOLUTION ORAL at 21:21

## 2019-06-28 RX ADMIN — DOCUSATE SODIUM 100 MG: 100 CAPSULE, LIQUID FILLED ORAL at 18:13

## 2019-06-28 RX ADMIN — SUCRALFATE 1 G: 1 SUSPENSION ORAL at 19:54

## 2019-06-28 RX ADMIN — PRAMIPEXOLE DIHYDROCHLORIDE 1 MG: 0.5 TABLET ORAL at 18:16

## 2019-06-28 RX ADMIN — METOPROLOL TARTRATE 25 MG: 25 TABLET, FILM COATED ORAL at 18:12

## 2019-06-28 RX ADMIN — GABAPENTIN 100 MG: 100 CAPSULE ORAL at 18:13

## 2019-06-28 RX ADMIN — MIDAZOLAM HYDROCHLORIDE 1 MG: 1 INJECTION, SOLUTION INTRAMUSCULAR; INTRAVENOUS at 13:25

## 2019-06-28 RX ADMIN — DEXTROSE MONOHYDRATE 30 ML: 50 INJECTION, SOLUTION INTRAVENOUS at 18:35

## 2019-06-28 RX ADMIN — PROPOFOL 150 MG: 10 INJECTION, EMULSION INTRAVENOUS at 13:11

## 2019-06-28 RX ADMIN — SODIUM CHLORIDE 500 MG: 9 INJECTION, SOLUTION INTRAVENOUS at 17:37

## 2019-06-28 RX ADMIN — CALCITONIN SALMON 200 UNITS: 200 INJECTION, SOLUTION INTRAMUSCULAR; SUBCUTANEOUS at 21:34

## 2019-06-28 RX ADMIN — DEXTROSE MONOHYDRATE 30 ML: 50 INJECTION, SOLUTION INTRAVENOUS at 21:05

## 2019-06-28 RX ADMIN — DIPHENHYDRAMINE HCL 25 MG: 25 TABLET ORAL at 18:09

## 2019-06-28 RX ADMIN — LIDOCAINE 2 PATCH: 50 PATCH CUTANEOUS at 22:20

## 2019-06-28 RX ADMIN — SODIUM CHLORIDE: 9 INJECTION, SOLUTION INTRAVENOUS at 17:31

## 2019-06-28 RX ADMIN — SODIUM CHLORIDE 500 ML: 9 INJECTION, SOLUTION INTRAVENOUS at 17:32

## 2019-06-28 RX ADMIN — SODIUM CHLORIDE, POTASSIUM CHLORIDE, SODIUM LACTATE AND CALCIUM CHLORIDE: 600; 310; 30; 20 INJECTION, SOLUTION INTRAVENOUS at 12:37

## 2019-06-28 RX ADMIN — OXYCODONE HYDROCHLORIDE 10 MG: 5 TABLET ORAL at 22:10

## 2019-06-28 RX ADMIN — LIDOCAINE HYDROCHLORIDE 40 MG: 20 INJECTION, SOLUTION INTRAVENOUS at 13:11

## 2019-06-28 RX ADMIN — ATORVASTATIN CALCIUM 10 MG: 10 TABLET, FILM COATED ORAL at 18:13

## 2019-06-28 RX ADMIN — ACETAMINOPHEN 650 MG: 325 TABLET, FILM COATED ORAL at 18:08

## 2019-06-28 RX ADMIN — CEFAZOLIN SODIUM 2 G: 2 INJECTION, SOLUTION INTRAVENOUS at 18:07

## 2019-06-28 RX ADMIN — MORPHINE SULFATE 2 MG: 4 INJECTION INTRAVENOUS at 21:18

## 2019-06-28 RX ADMIN — GADOBUTROL 6 ML: 604.72 INJECTION INTRAVENOUS at 10:59

## 2019-06-28 RX ADMIN — PROPOFOL 200 MCG/KG/MIN: 10 INJECTION, EMULSION INTRAVENOUS at 13:17

## 2019-06-28 RX ADMIN — ROCURONIUM BROMIDE 40 MG: 10 INJECTION, SOLUTION INTRAVENOUS at 13:11

## 2019-06-28 RX ADMIN — SENNOSIDES, DOCUSATE SODIUM 1 TABLET: 50; 8.6 TABLET, FILM COATED ORAL at 21:18

## 2019-06-28 RX ADMIN — ONDANSETRON 4 MG: 2 INJECTION INTRAMUSCULAR; INTRAVENOUS at 13:20

## 2019-06-28 RX ADMIN — MIDAZOLAM HYDROCHLORIDE 1 MG: 1 INJECTION, SOLUTION INTRAMUSCULAR; INTRAVENOUS at 13:11

## 2019-06-28 RX ADMIN — LORAZEPAM 1 MG: 1 TABLET ORAL at 19:35

## 2019-06-28 RX ADMIN — OXYCODONE HYDROCHLORIDE 10 MG: 10 TABLET, FILM COATED, EXTENDED RELEASE ORAL at 19:09

## 2019-06-28 RX ADMIN — SODIUM CHLORIDE, POTASSIUM CHLORIDE, SODIUM LACTATE AND CALCIUM CHLORIDE: 600; 310; 30; 20 INJECTION, SOLUTION INTRAVENOUS at 13:20

## 2019-06-28 RX ADMIN — MAGNESIUM OXIDE TAB 400 MG (241.3 MG ELEMENTAL MG) 400 MG: 400 (241.3 MG) TAB at 18:15

## 2019-06-28 RX ADMIN — ROCURONIUM BROMIDE 7 MG: 10 INJECTION, SOLUTION INTRAVENOUS at 13:55

## 2019-06-28 RX ADMIN — AMPHOTERICIN B 285.5 MG: 50 INJECTION, POWDER, LYOPHILIZED, FOR SOLUTION INTRAVENOUS at 19:05

## 2019-06-28 RX ADMIN — MAGNESIUM SULFATE IN WATER 0.5 G: 40 INJECTION, SOLUTION INTRAVENOUS at 13:20

## 2019-06-28 RX ADMIN — MORPHINE SULFATE 2 MG: 4 INJECTION INTRAVENOUS at 09:36

## 2019-06-28 RX ADMIN — FENTANYL CITRATE 50 MCG: 50 INJECTION, SOLUTION INTRAMUSCULAR; INTRAVENOUS at 13:11

## 2019-06-28 RX ADMIN — DEXAMETHASONE SODIUM PHOSPHATE 4 MG: 4 INJECTION, SOLUTION INTRA-ARTICULAR; INTRALESIONAL; INTRAMUSCULAR; INTRAVENOUS; SOFT TISSUE at 13:20

## 2019-06-28 RX ADMIN — SODIUM CHLORIDE 250 ML: 9 INJECTION, SOLUTION INTRAVENOUS at 22:04

## 2019-06-28 RX ADMIN — FENTANYL CITRATE 50 MCG: 50 INJECTION, SOLUTION INTRAMUSCULAR; INTRAVENOUS at 13:25

## 2019-06-28 ASSESSMENT — ENCOUNTER SYMPTOMS
HEADACHES: 0
COUGH: 0
SHORTNESS OF BREATH: 0
ABDOMINAL PAIN: 0
NAUSEA: 0
VOMITING: 0
DIARRHEA: 0
DIZZINESS: 0
CHILLS: 0
FEVER: 0

## 2019-06-28 ASSESSMENT — PAIN SCALES - GENERAL: PAIN_LEVEL: 1

## 2019-06-28 NOTE — THERAPY
Occupational Therapy Contact Note    Attempted OT treatment. Pt is down at MRI right now, and she is planned for a stereotactic biopsy this afternoon. Will attempt to see her in between procedures, however will attempt again another day as able if she is unavailable today.    Marlen Wilks, OTR/L

## 2019-06-28 NOTE — ANESTHESIA PREPROCEDURE EVALUATION
Relevant Problems   (+) COPD (chronic obstructive pulmonary disease) ([facility])   (+) Diabetes mellitus type 2 in obese ([facility])   (+) Essential hypertension   (+) GERD (gastroesophageal reflux disease)   (+) Pseudoaneurysm following procedure ([facility])       Physical Exam    Airway   Mallampati: II  TM distance: >3 FB  Neck ROM: full       Cardiovascular - normal exam  Rhythm: regular  Rate: normal  (-) murmur     Dental - normal exam         Pulmonary - normal exam  Breath sounds clear to auscultation     Abdominal    Neurological - normal exam         Other findings: Disorientation, dry mouth, gingivitis            Anesthesia Plan    ASA 3   ASA physical status 3 criteria: COPD    Plan - general       Airway plan will be ETT        Induction: intravenous    Postoperative Plan: Postoperative administration of opioids is intended.    Pertinent diagnostic labs and testing reviewed    Informed Consent:    Anesthetic plan and risks discussed with patient.    Use of blood products discussed with: patient whom consented to blood products.

## 2019-06-28 NOTE — PROGRESS NOTES
Infectious Disease Progress Note    Author: Karen Garcia M.D. Date & Time of service: 6/28/2019  1:15 PM    Chief Complaint:  Brain abscess, gluteal abscess  Vertebral OM    Interval History:  68 y.o. female admitted 5/29/2019 as a transfer from [facility] since 4/30/2019. + diabetes, SANTOSH of right hip with hardware, and breast cancer who was originally admitted to [facility] on 03/08/2019 for right hip and pelvic pain.  Work-up revealed a right iliopsoas lesion, gluteal abscess, and mult brain abscesses  6/1/2019-no fevers.  Continues to complain of significant pain in her hips.  Pain medications are being adjusted.  6/2/2019-no fevers.  Continues to remain off the antibiotics.  Awaiting Ortho evaluation  6/4/2019 patient febrile up to 103.  Having shaking chills.  Is not feeling well.  Denies any specific complaints.  6/10 AF WBC 5.3 somnolent No fever or new complaints  6/11 AF WBC 9 No fever or chills-some pain at surgical site. Limited participation with PT noted  6/12 AF WBC 7.3 somnolent but arousable-no new complaints  6/13 afebrile WBC 9.1 patient complaining of left hip pain.  Denies any headaches  6/14 afebrile, no CBC today.  Reports no new issues, tolerating antibiotics.  States the left hip pain is unchanged.  6/18 afebrile, WBC 6.9, HR 80s, -150s, on room air. Repeat CT on 6/17 showed no change in right iliac fluid collection, changes in right iliac/bilateral sacrum and left iliac suspicious of OM.   6/20- no fevers. C/o hip pain.   6/21/2019 no fevers.  Complains of some swelling at the right hip  6/22/2019-continues to complain of swelling of the right hip.  No fevers.  6/23/2019-no fevers.  The swelling is improved.  The ultrasound did not show any abscess or fluid collection.  6/24/2019 no fevers.  The hip pain is better.  No cough no shortness of breath  6/25/2019-no fevers.  The hip cultures remain negative.  She is scheduled for stereotactic biopsy on Friday.  6/26/2019 no fevers are  noted.  Patient complains of back pain.  In significant distress.  2019-no fevers.  Continues to complain of pain.  No new issues overnight   AF WBC 6.6 planned for MRIs and biopsy today-somnolent  Labs Reviewed, Medications Reviewed, and Wound Reviewed.    Review of Systems:  Review of Systems   Constitutional: Negative for chills and fever.   Respiratory: Negative for cough and shortness of breath.    Gastrointestinal: Negative for abdominal pain, diarrhea, nausea and vomiting.   Musculoskeletal: Positive for joint pain.        Left hip   Neurological: Negative for dizziness and headaches.   All other systems reviewed and are negative.  Unchanged    Hemodynamics:  Temp (24hrs), Av.6 °C (97.8 °F), Min:36.1 °C (96.9 °F), Max:36.9 °C (98.5 °F)  Temperature: 36.6 °C (97.9 °F)  Pulse  Av.7  Min: 54  Max: 125Heart Rate (Monitored): 83  Blood Pressure : 104/56, NIBP: 125/63       Physical Exam:  Physical Exam   Constitutional: No distress.   Thin   HENT:   Mouth/Throat: No oropharyngeal exudate.   Neck: Neck supple. No JVD present.   Cardiovascular: Normal rate and regular rhythm.    No murmur heard.  Pulmonary/Chest: Effort normal and breath sounds normal. No stridor. No respiratory distress.   Abdominal: Soft. There is no rebound and no guarding.   Musculoskeletal: She exhibits tenderness. She exhibits no edema.   Right hip with dressing.  No surrounding erythema   Lymphadenopathy:     She has no cervical adenopathy.   Neurological:   somnolent   Skin: Skin is warm. No rash noted. She is not diaphoretic.   Nursing note and vitals reviewed.      Meds:    Current Facility-Administered Medications:   •  [MAR Hold] diazePAM  •  [MAR Hold] oxyCODONE CR  •  [MAR Hold] gabapentin  •  [MAR Hold] potassium chloride SA  •  [MAR Hold] LORazepam  •  [MAR Hold] magnesium oxide  •  [MAR Hold] morphine injection  •  [MAR Hold] lactobacillus rhamnosus  •  [MAR Hold] oxyCODONE immediate-release  •  [MAR Hold]  sucralfate  •  [MAR Hold] Pharmacy Consult Request  •  [MAR Hold] acetaminophen  •  [DISCONTINUED] insulin regular **AND** [CANCELED] Accu-Chek ACHS **AND** NOTIFY MD and PharmD **AND** [MAR Hold] glucose **AND** [MAR Hold] dextrose 10% bolus  •  [MAR Hold] NS  •  [MAR Hold] pramipexole  •  [MAR Hold] lidocaine  •  [MAR Hold] temazepam  •  [MAR Hold] cyclobenzaprine  •  [MAR Hold] Respiratory Care per Protocol  •  [MAR Hold] amLODIPine  •  [MAR Hold] cinacalcet  •  [MAR Hold] losartan  •  [MAR Hold] acetaminophen  •  [MAR Hold] atorvastatin  •  [MAR Hold] omeprazole  •  [MAR Hold] metoprolol  •  [MAR Hold] senna-docusate **AND** [MAR Hold] polyethylene glycol/lytes **AND** [MAR Hold] magnesium hydroxide **AND** [MAR Hold] bisacodyl  •  [MAR Hold] ondansetron  •  [MAR Hold] ondansetron    Labs:  Recent Labs      06/27/19   1123   WBC  6.6   RBC  4.75   HEMOGLOBIN  13.4   HEMATOCRIT  42.4   MCV  89.3   MCH  28.2   RDW  56.4*   PLATELETCT  369   MPV  9.4   NEUTSPOLYS  69.50   LYMPHOCYTES  15.70*   MONOCYTES  10.40   EOSINOPHILS  3.30   BASOPHILS  0.80     Recent Labs      06/26/19   0041  06/27/19   1123   SODIUM  128*  132*   POTASSIUM  4.6  4.9   CHLORIDE  96  99   CO2  25  25   GLUCOSE  93  85   BUN  15  17     Recent Labs      06/26/19   0041  06/27/19   1123   CREATININE  0.66  0.65       Imaging:  MRI of the brain on 6/8/2019  Impression       1.  Interval progression of supra and infratentorial intra-axial nodules and associated edema. This short-term interval progression favors infection over neoplasm though both remain considerations.  2.  Mild atrophy         Micro:  Results     Procedure Component Value Units Date/Time    AFB Culture [[id number]] Collected:  06/19/19 0930    Order Status:  Completed Specimen:  Tissue from Other Body Fluid Updated:  06/25/19 0918     Significant Indicator NEG     Source TISS     Site Right Hip deep bone     Culture Result Culture in progress.     AFB Smear Results No acid fast  "bacilli seen.    Narrative:       Collected By:[id number] BEATRICE HARRIS  Bone biopsy right hip  Collected By:[id number] BEATRICE HARRIS  Right hip fluid collection  Collected By:[id number] BEATRICE HARRIS    Fungal Smear [[id number]] Collected:  06/19/19 0930    Order Status:  Completed Specimen:  Tissue from Other Body Fluid Updated:  06/25/19 0918     Significant Indicator NEG     Source TISS     Site Right Hip deep bone     Fungal Smear Results No fungal elements seen.    Narrative:       Collected By:[id number] BEATRICE HARRIS  Bone biopsy right hip  Collected By:[id number] BEATRICE HARRIS  Right hip fluid collection  Collected By:[id number] BEATRICE HARRIS    Fungal Culture [[id number]] Collected:  06/19/19 0930    Order Status:  Completed Specimen:  Tissue Updated:  06/25/19 0918     Significant Indicator NEG     Source TISS     Site Right Hip deep bone     Culture Result No fungal growth to date.    Narrative:       Collected By:[id number] BEATRICE HARRIS  Bone biopsy right hip  Collected By:[id number] BEATRICE HARRIS  Right hip fluid collection  Collected By:[id number] BEATRICE HARRIS    CULTURE TISSUE W/ GRM STAIN [[id number]] Collected:  06/19/19 0930    Order Status:  Completed Specimen:  Tissue Updated:  06/25/19 0918     Significant Indicator NEG     Source TISS     Site Right Hip deep bone     Culture Result No growth at 72 hours.     Gram Stain Result No organisms seen.    Narrative:       Collected By:[id number] BEATRICE HARRIS  Bone biopsy right hip  Collected By:[id number] BEATRICE HARRIS  Right hip fluid collection  Collected By:[id number] BEATRICE HARRIS          Assessment:  Gluteal and iliopsoas abscess  Brain abscesses vs mets  Breast cancer  DM2    Plan:  Brain abscesses vs mets.  Persistent with interval progression on last MRI  MRI 4/23 \"supra and infratentorial brain parenchyma. Decrease in the size of the lesions. Interval reduction in the extent of the surrounding white matter edema consistent with improvement/treatment response of the most of the multifocal " "brain abscesses.  MRI 5/21 showed innumerable microabscesses vs mets  Abx (vancomycin, cefepime and Flagyl) discontinued on 5/23 after ~8 weeks of treatment-developed new fevers on 6/4  MRI on 6/8 with interval progression of supra and infratentorial intra--axial nodules and associated edema.  New right thalamus lesion. Radiology favors infection over neoplasm though both remain considerations (had been off abx)  Mult blood cultures neg  CHAS neg 3/15 and 5/24  Treated with mult prolonged courses of abx-stopped 6/24  Repeat MRI 6/22 worse  Plan for biopsy today- please send for pathology, regular, AFB, fungal cultures  Stealth MRI today     Latent TB  Check AFB as above    Gluteal and iliopsoas abscess   New OM L5, S1-3  New abscesses  Adjacent to hardware in right hip (placed 12/17/18)  CT 4/23 \"Fluid collections within the right gluteal and iliacis muscles.. The collection within the right gluteal muscle has unchanged while the fluid collection within the right iliacis muscle is increased somewhat in size.\" 2.7 cm  Cultures 3/29 and 4/4 neg  MRI on 5/20/2019 - interval decrease in collection in R gluteal muscle and persistent multiloculated collection in R iliacus muscle.   CT 5/28 no change  s/p drainage on 5/30/2019-cultures negative  S/p removal hardware 6/5-no cultures done  Panculture neg  1, 3 beta glucan neg  S/p removal hardware 6/5/19  CT on 6/8 with residual abscess in the right iliacus muscle, 2.5 x 1.8 cm, slightly smaller than prior  s/p CT-guided deep bone biopsy 6/19/2019.  Cultures remain negative; path not reported   MRI 6/28 chronic discitis, diffuse OM L5, 3 and 4 cm abscesses right glute, 1.5 cm abscess or necrosis right psoterior ileum, 3.3 cm abscess right SI joint  Hold abx until drained and cultured if feasible-also recheck path    Type 2 DM  Hemoglobin A1c 6.3   Keep BS under 150 to help control current infection                  "

## 2019-06-28 NOTE — CARE PLAN
Problem: Knowledge Deficit  Goal: Knowledge of disease process/condition, treatment plan, diagnostic tests, and medications will improve    Intervention: Explain information regarding disease process/condition, treatment plan, diagnostic tests, and medications and document in education  Unable to obtain consent for tomorrow procedure contacted brother thru phone and gave consent for the procedure witnessed by 2 RN.      Problem: Skin Integrity  Goal: Risk for impaired skin integrity will decrease    Intervention: Assess and monitor skin integrity, appearance and/or temperature  Encouraged to turn side to side or put pillow on her left side, needs further teaching and encouragement.

## 2019-06-28 NOTE — ANESTHESIA TIME REPORT
Anesthesia Start and Stop Event Times     Date Time Event    6/28/2019 1307 Anesthesia Start        Responsible Staff  06/28/19    Name Role Begin End    Scotty Melendrez M.D. Anesth 1307         Preop Diagnosis (Free Text):  Pre-op Diagnosis             Preop Diagnosis (Codes):  Diagnosis Information     Diagnosis Code(s):         Post op Diagnosis  Brain mass  multiple small masses    Premium Reason  Non-Premium    Comments:

## 2019-06-28 NOTE — PROGRESS NOTES
Hospital Medicine Daily Progress Note    Date of Service  6/28/2019    Chief Complaint  persistent abscess.    Hospital Course     Ms. Martini is a 68-year-old female who was transferred from [facility] on 5/29/2019 with persistent right gluteal abscesses since sacral fracture repair in December.  She has had multiple IR drainage.  She initially presented with pelvic pain, low back pain, and confusion. She also had new slurred speech. Imaging of the abdomen, pelvis and brain revealed abscesses and she was treated with a full course of IV antibiotics per ID.  Her slurred speech improved and she was able to ambulate with physical therapy. However, repeat imaging showed persistent pelvic abscesses.  MRI brain shows increase in brain lesions.  Her brain lesions are known from prior imaging and there is concern they represent metastasis.  Oncology aware and do not recommend further imaging. Cultures remained negative and a CHAS done by Dr. Potter showed no vegetations. The size of the pelvic abscesses was reviewed by interventional radiology and the case was discussed with Dr. Finley who recommended transfer to [facility] for CT guided drainage. She underwent drainage on 5/30. On 6/4, she became febrile again, so restarted on cefepime and vancomycin per ID and has been on them since.  Right hip hardware was removed on 6/5. Repeat MRI brain on 6/7/19 saw progression of the supra and infratentorial intra-axial nodules with edema, prompting a Neurosurgery consult.  Dr. Nguyen stated lesions are not amendable to biopsy stereotactically, and favors infectious etiology, recommended continued antibiotics, antineuroleptic and repeat brain MRI in 2 weeks (around 6/21).  Repeat CT pelvis on 6/9 showed residual fluid collection 2.5 x 1.8 cm in right iliacus muscle., slightly smaller than before.  Cefepime + vanc were continue. Work up was broadened to include fungal etiology, and she is started on empiric started fluconazole. Repeat CT on 6/17  showed no significant interval change in the size of the right iliacus muscle fluid collections, with changes in the right iliac bone, bilateral sacrum, and left iliac bone suspicious for osteomyelitis, and changes at L5-S1 suspicious for discitis/osteomyelitis, along with hyperdense right gluteal lesion, moderately suspicious for postoperative pseudoaneurysm with adjacent hematoma. IR felt aspiration of the fluid will have low yield, and so recommended biopsy of the nonhealing right iliac fracture area instead. Biopsy of the hip showed no AFB, organism, or fungal elements. MRI of the brain showed innumerable supra and infratentorial enhancing nodules again noted, with multiple lesions appearing somewhat larger and with increased enhancement.      Interval Problem Update  DARYL overnight  Plan for MRI followed by biopsy today  Pain unchanged    Consultants/Specialty  Infectious disease  Orthopedic surgery  Neurosurgery, Dr. Nguyen  IR    Code Status  DNR/DNI    Disposition  Anticipate discharge to SNF once medically cleared. Accepted by [facility]    Review of Systems  Review of Systems   Constitutional: Negative for chills and fever.   Gastrointestinal: Negative for abdominal pain and nausea.   Musculoskeletal:        Low back pain and b/l hip pain    Neurological: Negative for dizziness and headaches.        Physical Exam  Temp:  [36.1 °C (96.9 °F)-36.9 °C (98.5 °F)] 36.6 °C (97.9 °F)  Pulse:  [64-95] 76  Resp:  [16-18] 18  BP: (104-145)/(49-71) 104/56  SpO2:  [90 %-97 %] 91 %    Physical Exam   Constitutional: She appears distressed (due to pain).   Moving legs frequently d/t pain, tearful   HENT:   Head: Normocephalic and atraumatic.   Eyes: Conjunctivae are normal. Right eye exhibits no discharge. Left eye exhibits no discharge.   Pulmonary/Chest: Effort normal. No respiratory distress.   Abdominal: Soft. She exhibits no distension. There is no tenderness.   Musculoskeletal: She exhibits no edema or tenderness.    Tenderness over lower back and hips. Protuberant soft mass above right hip   Neurological: She is alert.   Skin: Skin is warm and dry.   Nursing note and vitals reviewed.      Fluids  No intake or output data in the 24 hours ending 06/28/19 1344    Laboratory  Recent Labs      06/27/19   1123   WBC  6.6   RBC  4.75   HEMOGLOBIN  13.4   HEMATOCRIT  42.4   MCV  89.3   MCH  28.2   MCHC  31.6*   RDW  56.4*   PLATELETCT  369   MPV  9.4     Recent Labs      06/26/19   0041  06/27/19   1123   SODIUM  128*  132*   POTASSIUM  4.6  4.9   CHLORIDE  96  99   CO2  25  25   GLUCOSE  93  85   BUN  15  17   CREATININE  0.66  0.65   CALCIUM  11.7*  12.3*     Recent Labs      06/27/19   1123   APTT  32.5   INR  1.02               Imaging    Assessment/Plan  * Abscess of right iliac muscle and right gluteus medius muscle- (present on admission)   Assessment & Plan    - Recurrent issue since sacral fracture repair in December 2018. Has had multiple IR drainage and antibiotics. Recent cultures remain unrevealing.    - s/p biopsy of the nonhealing right iliac fracture area. Showed no AFB, organism, or fungal elements.  Cultures negative.  I discussed case with ID and will hold all abx at this time in hopes of more successful brain biopsy culture results  Worsening pain. I Started patient on oxycontin and gabapentin      Brain lesion- (present on admission)   Assessment & Plan    -Repeat brain MRI after 2 weeks still shows innumerable supra and infratentorial enhancing nodules again noted, with multiple lesions appearing somewhat larger and with increased enhancement, despite antibiotics.  Plan  for right Stealth guided open biopsy of right superficial T/P lesion on 6/28     Pseudoaneurysm following procedure ([facility])- (present on admission)   Assessment & Plan    -Likely postoperative.  No further interventions needed.  Monitor.     Sepsis ([facility])   Assessment & Plan    -This is sepsis (without associated acute organ dysfunction).  Likely  from her abscesses.  Blood cultures negative.  -Sepsis has resolved.  Hold antibiotics with cefepime, Vancomycin, Diflucan as per ID.  Further work-up as above.  -Continue close hemodynamic monitoring.  Watch for fevers.     Hypomagnesemia- (present on admission)   Assessment & Plan    -Better again today after IV replacement.   -Continue BID PO magnesium oxide. Repeat magnesium level in AM.     Hypercalcemia- (present on admission)   Assessment & Plan    -Resolved.     Acquired circulating anticoagulants (HCC)- (present on admission)   Assessment & Plan    -I will hold her Xarelto for need for stereotactic brain biopsy.     Non-severe protein-calorie malnutrition (HCC)- (present on admission)   Assessment & Plan    -Moderate.  -Continue nutrition support per RD.       History of DVT (deep vein thrombosis)- (present on admission)   Assessment & Plan    -holding xarelto for brain biopsy     Essential hypertension- (present on admission)   Assessment & Plan    -maintaining good control.  Continue Lopressor, Cozaar, and Norvasc.  Monitor blood pressure trend closely.     Chronic hyponatremia- (present on admission)   Assessment & Plan    - remains stable. Continue daily monitoring.      RLS (restless legs syndrome)- (present on admission)   Assessment & Plan    - Continue on pramipexole.      Chronic pain- (present on admission)   Assessment & Plan    - well controlled. Primarily located at left hip.  -Continue as needed oxycodone, Flexeril, and Tylenol.     History of breast cancer- (present on admission)   Assessment & Plan    -S/p left mastectomy and breast implant.  -Needs follow-up as outpatient.     COPD (chronic obstructive pulmonary disease) (HCC)- (present on admission)   Assessment & Plan    - Not on any exacerbation at this time, continue RT protocol.          VTE prophylaxis: SCDs    I have seen and examined patient on 6/28/2019. I have reviewed vitals, new labs and imaging. I have discussed POC with RN.  There are no changes from (6/27/2019) except for what is mentioned above.

## 2019-06-28 NOTE — PROGRESS NOTES
Patient arrived to S122 via OR RN. Patient attached to ICU monitors. Temp 97.7F, wt 50.3kg, HR 83, /87, RR 18, SPO2 96 % on 1L NC    2 RN skin assessment complete  - Bilateral abrasions throughout lower extremities.  - Biopsy site with surgical dressing intact with old drainage noted.   - Raised mass noted to right hip    CHG bath completed. Sacral mepilex in place

## 2019-06-28 NOTE — ANESTHESIA POSTPROCEDURE EVALUATION
Patient: Muriel Martini    Procedure Summary     Date:  06/28/19 Room / Location:  [facility] OR 05 / SURGERY [facility]    Anesthesia Start:  1307 Anesthesia Stop:  1442    Procedure:  BRAIN BIOPSY, PARTIAL OPEN (Right Head) Diagnosis:  (brain mass)    Surgeon:  Christopher Nguyen M.D. Responsible Provider:  Scotty Melendrez M.D.    Anesthesia Type:  general ASA Status:  3          Final Anesthesia Type: general  Last vitals  BP   Blood Pressure : 104/56, NIBP: 125/63    Temp   36.6 °C (97.9 °F)    Pulse   Pulse: 76, Heart Rate (Monitored): 83   Resp   18    SpO2   91 %      Anesthesia Post Evaluation    Patient location during evaluation: PACU  Patient participation: complete - patient participated  Level of consciousness: awake and alert  Pain score: 1    Airway patency: patent  Anesthetic complications: no  Cardiovascular status: hemodynamically stable  Respiratory status: acceptable  Hydration status: euvolemic    PONV: none           Nurse Pain Score: 0 (NPRS)

## 2019-06-28 NOTE — ANESTHESIA QCDR
2019 [facility] Clinical Data Registry (for Quality Improvement)     Postoperative nausea/vomiting risk protocol (Adult = 18 yrs and Pediatric 3-17 yrs)- (430 and 463)  General inhalation anesthetic (NOT TIVA) with PONV risk factors: No  Provision of anti-emetic therapy with at least 2 different classes of agents: N/A  Patient DID NOT receive anti-emetic therapy and reason is documented in Medical Record: N/A    Multimodal Pain Management- (AQI59)  Patient undergoing Elective Surgery (i.e. Outpatient, or ASC, or Prescheduled Surgery prior to Hospital Admission): Yes  Use of Multimodal Pain Management, two or more drugs and/or interventions, NOT including systemic opioids: Yes   Exception: Documented allergy to multiple classes of analgesics:  N/A    PACU assessment of acute postoperative pain prior to Anesthesia Care End- Applies to Patients Age = 18- (ABG7)  Initial PACU pain score is which of the following: < 7/10  Patient unable to report pain score: N/A    Post-anesthetic transfer of care checklist/protocol to PACU/ICU- (426 and 427)  Upon conclusion of case, patient transferred to which of the following locations: PACU/Non-ICU  Use of transfer checklist/protocol: Yes  Exclusion: Service Performed in Patient Hospital Room (and thus did not require transfer): N/A    PACU Reintubation- (AQI31)  General anesthesia requiring endotracheal intubation (ETT) along with subsequent extubation in OR or PACU: Yes  Required reintubation in the PACU: No   Extubation was a planned trial documented in the medical record prior to removal of the original airway device:  N/A    Unplanned admission to ICU related to anesthesia service up through end of PACU care- (MD51)  Unplanned admission to ICU (not initially anticipated at anesthesia start time): No

## 2019-06-28 NOTE — PROGRESS NOTES
Received bedside report and assumed pt care at 0700. Pt resting with no signs of acute distress. VSS. Assessment complete, orders reviewed, labs reviewed. Updated whiteboard and discussed plan of care with pt. MRI scheduled for this morning. Safety checklist complete in epic. Ativan ordered for claustrophobia per MD telephone order. Sadocials to be placed bedside by surgeon prior to procedure.

## 2019-06-28 NOTE — PROGRESS NOTES
Neurosurgery Progress Note    Subjective:  No events    Exam:    A&O x3  PERRL 3mm  Face symm, tongue midline  HERNANDEZ with FS grossly        BP  Min: 104/56  Max: 145/49  Pulse  Av.5  Min: 64  Max: 95  Resp  Av  Min: 16  Max: 18  Temp  Av.6 °C (97.8 °F)  Min: 36.1 °C (96.9 °F)  Max: 36.9 °C (98.5 °F)  SpO2  Av.4 %  Min: 90 %  Max: 97 %    No Data Recorded    Recent Labs      19   1123   WBC  6.6   RBC  4.75   HEMOGLOBIN  13.4   HEMATOCRIT  42.4   MCV  89.3   MCH  28.2   MCHC  31.6*   RDW  56.4*   PLATELETCT  369   MPV  9.4     Recent Labs      19   0041  19   1123   SODIUM  128*  132*   POTASSIUM  4.6  4.9   CHLORIDE  96  99   CO2  25  25   GLUCOSE  93  85   BUN  15  17   CREATININE  0.66  0.65   CALCIUM  11.7*  12.3*     Recent Labs      19   1123   APTT  32.5   INR  1.02           Intake/Output       19 0700 - 19 0659 19 0700 - 19 0659      [id number] 3904-1196 Total 1654-5584 2758-0297 Total       Intake    P.O.  480  -- 480  --  -- --    P.O. 480 -- 480 -- -- --    Total Intake 480 -- 480 -- -- --       Output    Urine  450  -- 450  --  -- --    Number of Times Voided 4 x -- 4 x 1 x -- 1 x    Urine Void (mL) 450 -- 450 -- -- --    Total Output 450 -- 450 -- -- --       Net I/O     30 -- 30 -- -- --            Intake/Output Summary (Last 24 hours) at 19 1220  Last data filed at 19 1335   Gross per 24 hour   Intake              240 ml   Output                0 ml   Net              240 ml            • [MAR Hold] diazePAM  5 mg Once   • [MAR Hold] oxyCODONE CR  10 mg Q12HRS   • [MAR Hold] gabapentin  100 mg TID   • [MAR Hold] potassium chloride SA  20 mEq DAILY   • [MAR Hold] LORazepam  1 mg QHS PRN   • [MAR Hold] magnesium oxide  400 mg BID   • [MAR Hold] morphine injection  2 mg Q2HRS PRN   • [MAR Hold] lactobacillus rhamnosus  1 Cap QDAY with Breakfast   • [MAR Hold] oxyCODONE immediate-release  5-10 mg Q4HRS PRN   • [MAR Hold]  sucralfate  1 g Q6HRS   • [MAR Hold] Pharmacy Consult Request  1 Each PHARMACY TO DOSE   • [MAR Hold] acetaminophen  1,000 mg BID   • [MAR Hold] glucose  16 g Q15 MIN PRN    And   • [MAR Hold] dextrose 10% bolus  250 mL Q15 MIN PRN   • [MAR Hold] NS  500 mL Once PRN   • [MAR Hold] pramipexole  1 mg TID   • [MAR Hold] lidocaine  2 Patch Q24HR   • [MAR Hold] temazepam  15 mg QHS PRN   • [MAR Hold] cyclobenzaprine  10 mg TID PRN   • [MAR Hold] Respiratory Care per Protocol   Continuous RT   • [MAR Hold] amLODIPine  10 mg Q DAY   • [MAR Hold] cinacalcet  60 mg DAILY   • [MAR Hold] losartan  50 mg Q DAY   • [MAR Hold] acetaminophen  650 mg Q6HRS PRN   • [MAR Hold] atorvastatin  10 mg Q EVENING   • [MAR Hold] omeprazole  20 mg DAILY   • [MAR Hold] metoprolol  25 mg TWICE DAILY   • [MAR Hold] senna-docusate  2 Tab BID    And   • [MAR Hold] polyethylene glycol/lytes  1 Packet QDAY PRN    And   • [MAR Hold] magnesium hydroxide  30 mL QDAY PRN    And   • [MAR Hold] bisacodyl  10 mg QDAY PRN   • [MAR Hold] ondansetron  4 mg Q4HRS PRN   • [MAR Hold] ondansetron  4 mg Q4HRS PRN       Assessment and Plan:  Hospital day #7 multiple cerebral mets v infection  Enlargement despite abx over past month  OR now for stealth-guided right parietal open biopsy  Na 132 - management  Per IM

## 2019-06-28 NOTE — ANESTHESIA PROCEDURE NOTES
Airway  Date/Time: 6/28/2019 1:13 PM  Performed by: FREDERICK COOPER  Authorized by: FREDERICK COOPER     Location:  OR  Urgency:  Elective  Indications for Airway Management:  Anesthesia  Spontaneous Ventilation: absent    Sedation Level:  Deep  Preoxygenated: Yes    Patient Position:  Sniffing  Final Airway Type:  Endotracheal airway  Final Endotracheal Airway:  ETT  Cuffed: Yes    Technique Used for Successful ETT Placement:  Direct laryngoscopy  Insertion Site:  Oral  Blade Type:  Trevino  Laryngoscope Blade/Videolaryngoscope Blade Size:  2  ETT Size (mm):  7.0  Measured from:  Teeth  ETT to Teeth (cm):  22  Placement Verified by: auscultation and capnometry    Cormack-Lehane Classification:  Grade IIa - partial view of glottis  Number of Attempts at Approach:  1

## 2019-06-28 NOTE — PROGRESS NOTES
"Received alert and oriented x 2. Check vitals sign and recorded accordingly and due med given per MAR. Monitor sign and symptoms of respiratory distress and treatment given accordingly per MAR.Medicated per MAR and reassessed every 2 hours per protocol. Call light within reach. Bed alarm in placed. Needs attended. Talked to her brother over the phone and got a telephone consent witnessed by 2 RN for tomorrow procedure. Will continue to monitor./68   Pulse 95   Temp 36.9 °C (98.4 °F) (Temporal)   Resp 16   Ht 1.575 m (5' 2\")   Wt 57.1 kg (125 lb 14.1 oz)   SpO2 90%   Breastfeeding? No   BMI 23.02 kg/m² .  "

## 2019-06-28 NOTE — PROGRESS NOTES
· 2 RN skin check complete with PATRICK Landrum.   · Devices in place: PIV.  · Skin assessed under devices: YES.  · Confirmed pressure ulcers found on: N/A.  · New potential pressure ulcers noted on: N/A. Wound consult placed? N/A. Photo uploaded? N/A.   · The following interventions are in place: patient turns self, incontinence care, TORREY applied PRN and pillows in use for support & positioning.         Coccyx red and blanching applied torrey cream , encouraged to be turn, right lateral leg pink and abrasion noted mepilex applied, left superior area pink and abrasion foam dressing        applied.

## 2019-06-28 NOTE — PROGRESS NOTES
Called for probable fungus in specimen Dr Nguyen  Start lipo Ampho B pending ID  Prior cocci, Aspergillus, and Fungitell neg  Check crypto

## 2019-06-28 NOTE — OR SURGEON
Immediate Post OP Note    PreOp Diagnosis: brain lesions     PostOp Diagnosis: brain lesions     Procedure(s):  BRAIN BIOPSY, OPEN - Wound Class: Clean    Surgeon(s):  Christopher Nguyen M.D.    Anesthesiologist/Type of Anesthesia:  Anesthesiologist: Scotty Melendrez M.D./General    Surgical Staff:  Assistant: NATALY Martinez  Circulator: Ranjit Junior R.N.; Jacki Robles R.N.  Scrub Person: Joanna Tello C.N.A.  Stealth : Audrey Hernández    Specimens removed if any:  ID Type Source Tests Collected by Time Destination   1 : right parietal lesion. cultures.  Tissue Brain AFB CULTURE, FUNGAL CULTURE, GRAM STAIN ONLY, AEROBIC/ANAEROBIC CULTURE (SURGERY) Christopher Nguyen M.D. 6/28/2019  2:03 PM    A : right parietal lesion Tissue Brain PATHOLOGY SPECIMEN Christopher Nguyen M.D. 6/28/2019  2:04 PM        Estimated Blood Loss: min     Findings: good lesion sample; prelim path c/w fungus, not tumor    Complications: none        6/28/2019 2:42 PM WOODY MartinezPBE

## 2019-06-28 NOTE — OP REPORT
DATE OF SERVICE:  06/28/2019    PREOPERATIVE DIAGNOSIS:  Multiple presumed metastatic lesions to the brain   without formal diagnosis and question of infection.    POSTOPERATIVE DIAGNOSIS:  Likely fungal infection.    OPERATION PERFORMED:  1.  Right-sided craniotomy for resection of superficial mass.  2.  Use of Stealth neuronavigation.    SURGEON:  Christopher Nguyen MD    ASSISTANT:.  PAKO York    ANESTHESIA:  General.    COMPLICATIONS:  None.    ESTIMATED BLOOD LOSS:  Minimal.    DESCRIPTION OF PROCEDURE:  The patient was brought to the operating room and   identified in the usual fashion.  General endotracheal anesthesia was induced   by the anesthesia team.  The patient was then placed supine on the operating   room table.  A bump was placed under the patient's right shoulder.  Head was   turned toward the left exposing the right temporoparietal area.  We then   registered the Stealth with approximately 2 mm of accuracy.  We then planned   the incision just behind the ear and just above the pinna.  A linear incision   centered on the location of what we thought was the tumor came to the surface.    The patient was then prepped and draped in the usual sterile fashion.  Local   anesthesia was infiltrated in subcutaneous tissue.  A 10 blade was used to   incise the skin.  Dissection was carried down to bone using Bovie   electrocautery.  A single nirali hole was placed.  We then turned a standard   craniotomy flap after we had  the dura from the overlying bone using   a Penfield 1 and 3.  FloSeal with gentle tamponade was used for the bone   edges.  We then used a 4-0 Nurolon suture to tent up the dura.  We then opened   the dura in a cruciate fashion.  Leaflets were tacked back using 4-0 Nurolon.    We then confirmed excellent accuracy of the Stealth on bony landmarks and   then planned a corticectomy.  Bipolar electrocautery was used to perform a   corticectomy.  We then used microscissors  to open up the lenny.  We then used a   Penfield 1 and suction and gentle distraction to develop a plane.  We found a   well circumscribed mass that was tan in color.  There was no pus that had the   appearance of tumor.  It was then sent for frozen pathology, which touch prep   was consistent with likely fungal infection.  We then used bipolar   electrocautery for hemostasis.  We sent off other small pieces for culture.    Copious amounts of antibiotic irrigation were used to wash out the wound.    Once we had meticulous hemostasis, we then lined the cavity with Surgicel.    Dural leaflets were tacked back using 4-0 Nurolon.  Duragen was placed over   the dural defect.  Titanium plates and screws were used to replace the bone   flap.  Then, we closed the incision in layers and topped with Xeroform, Telfa,   and Medipore tape.  All sponge and needle counts were correct x2 at the end   of the case.  I was present and scrubbed for the entire procedure.  The   patient was extubated and transferred to the recovery room in stable   condition.       ____________________________________     DARON WHITMAN MD    CPD / NTS    DD:  06/28/2019 14:39:30  DT:  06/28/2019 16:10:26    D#:  [id number]  Job#:  [id number]

## 2019-06-28 NOTE — CARE PLAN
Problem: Knowledge Deficit  Goal: Knowledge of disease process/condition, treatment plan, diagnostic tests, and medications will improve  Outcome: PROGRESSING AS EXPECTED  Explained scheduled MRI to pt, no questions at this time. Pt is agreeable and ready for procedure.    Problem: Mobility  Goal: Risk for activity intolerance will decrease  Outcome: PROGRESSING SLOWER THAN EXPECTED  Pt c/o Right hip pain during ambulation.encouraged to straighten legs while in bed and change positions frequently to reduce pain.

## 2019-06-29 LAB
ANION GAP SERPL CALC-SCNC: 11 MMOL/L (ref 0–11.9)
BUN SERPL-MCNC: 22 MG/DL (ref 8–22)
CALCIUM SERPL-MCNC: 10.3 MG/DL (ref 8.5–10.5)
CHLORIDE SERPL-SCNC: 101 MMOL/L (ref 96–112)
CO2 SERPL-SCNC: 25 MMOL/L (ref 20–33)
CREAT SERPL-MCNC: 0.71 MG/DL (ref 0.5–1.4)
ERYTHROCYTE [DISTWIDTH] IN BLOOD BY AUTOMATED COUNT: 52.9 FL (ref 35.9–50)
GLUCOSE SERPL-MCNC: 117 MG/DL (ref 65–99)
HCT VFR BLD AUTO: 34.6 % (ref 37–47)
HGB BLD-MCNC: 11.6 G/DL (ref 12–16)
MAGNESIUM SERPL-MCNC: 1.3 MG/DL (ref 1.5–2.5)
MCH RBC QN AUTO: 29 PG (ref 27–33)
MCHC RBC AUTO-ENTMCNC: 33.5 G/DL (ref 33.6–35)
MCV RBC AUTO: 86.5 FL (ref 81.4–97.8)
PLATELET # BLD AUTO: 293 K/UL (ref 164–446)
PMV BLD AUTO: 9.7 FL (ref 9–12.9)
POTASSIUM SERPL-SCNC: 4 MMOL/L (ref 3.6–5.5)
RBC # BLD AUTO: 4 M/UL (ref 4.2–5.4)
RHODAMINE-AURAMINE STN SPEC: NORMAL
SIGNIFICANT IND 70042: NORMAL
SITE SITE: NORMAL
SODIUM SERPL-SCNC: 137 MMOL/L (ref 135–145)
SOURCE SOURCE: NORMAL
WBC # BLD AUTO: 7.7 K/UL (ref 4.8–10.8)

## 2019-06-29 PROCEDURE — 770006 HCHG ROOM/CARE - MED/SURG/GYN SEMI*

## 2019-06-29 PROCEDURE — 700102 HCHG RX REV CODE 250 W/ 637 OVERRIDE(OP): Performed by: INTERNAL MEDICINE

## 2019-06-29 PROCEDURE — A9270 NON-COVERED ITEM OR SERVICE: HCPCS | Performed by: HOSPITALIST

## 2019-06-29 PROCEDURE — 700111 HCHG RX REV CODE 636 W/ 250 OVERRIDE (IP): Mod: JG | Performed by: INTERNAL MEDICINE

## 2019-06-29 PROCEDURE — A9270 NON-COVERED ITEM OR SERVICE: HCPCS | Performed by: NURSE PRACTITIONER

## 2019-06-29 PROCEDURE — 700102 HCHG RX REV CODE 250 W/ 637 OVERRIDE(OP): Performed by: HOSPITALIST

## 2019-06-29 PROCEDURE — 99291 CRITICAL CARE FIRST HOUR: CPT | Performed by: INTERNAL MEDICINE

## 2019-06-29 PROCEDURE — 700105 HCHG RX REV CODE 258: Performed by: NURSE PRACTITIONER

## 2019-06-29 PROCEDURE — 85027 COMPLETE CBC AUTOMATED: CPT

## 2019-06-29 PROCEDURE — 80048 BASIC METABOLIC PNL TOTAL CA: CPT

## 2019-06-29 PROCEDURE — 700111 HCHG RX REV CODE 636 W/ 250 OVERRIDE (IP): Performed by: INTERNAL MEDICINE

## 2019-06-29 PROCEDURE — 700102 HCHG RX REV CODE 250 W/ 637 OVERRIDE(OP): Performed by: NURSE PRACTITIONER

## 2019-06-29 PROCEDURE — 700101 HCHG RX REV CODE 250: Performed by: NURSE PRACTITIONER

## 2019-06-29 PROCEDURE — A9270 NON-COVERED ITEM OR SERVICE: HCPCS | Performed by: INTERNAL MEDICINE

## 2019-06-29 PROCEDURE — 99233 SBSQ HOSP IP/OBS HIGH 50: CPT | Performed by: INTERNAL MEDICINE

## 2019-06-29 PROCEDURE — 700112 HCHG RX REV CODE 229: Performed by: NURSE PRACTITIONER

## 2019-06-29 PROCEDURE — 99232 SBSQ HOSP IP/OBS MODERATE 35: CPT | Performed by: HOSPITALIST

## 2019-06-29 PROCEDURE — 700111 HCHG RX REV CODE 636 W/ 250 OVERRIDE (IP): Performed by: HOSPITALIST

## 2019-06-29 PROCEDURE — 700105 HCHG RX REV CODE 258: Performed by: INTERNAL MEDICINE

## 2019-06-29 PROCEDURE — 700111 HCHG RX REV CODE 636 W/ 250 OVERRIDE (IP): Performed by: NURSE PRACTITIONER

## 2019-06-29 PROCEDURE — 83735 ASSAY OF MAGNESIUM: CPT

## 2019-06-29 RX ORDER — MAGNESIUM SULFATE HEPTAHYDRATE 40 MG/ML
4 INJECTION, SOLUTION INTRAVENOUS ONCE
Status: COMPLETED | OUTPATIENT
Start: 2019-06-29 | End: 2019-06-29

## 2019-06-29 RX ORDER — MAGNESIUM SULFATE HEPTAHYDRATE 40 MG/ML
2 INJECTION, SOLUTION INTRAVENOUS ONCE
Status: COMPLETED | OUTPATIENT
Start: 2019-06-29 | End: 2019-06-29

## 2019-06-29 RX ADMIN — GABAPENTIN 100 MG: 100 CAPSULE ORAL at 05:22

## 2019-06-29 RX ADMIN — GABAPENTIN 100 MG: 100 CAPSULE ORAL at 12:39

## 2019-06-29 RX ADMIN — OMEPRAZOLE 20 MG: 20 CAPSULE, DELAYED RELEASE ORAL at 05:22

## 2019-06-29 RX ADMIN — DOCUSATE SODIUM 100 MG: 100 CAPSULE, LIQUID FILLED ORAL at 05:23

## 2019-06-29 RX ADMIN — AMPHOTERICIN B 285.5 MG: 50 INJECTION, POWDER, LYOPHILIZED, FOR SOLUTION INTRAVENOUS at 17:45

## 2019-06-29 RX ADMIN — DIPHENHYDRAMINE HCL 25 MG: 25 TABLET ORAL at 17:11

## 2019-06-29 RX ADMIN — SUCRALFATE 1 G: 1 SUSPENSION ORAL at 12:39

## 2019-06-29 RX ADMIN — SODIUM CHLORIDE 500 MG: 9 INJECTION, SOLUTION INTRAVENOUS at 05:23

## 2019-06-29 RX ADMIN — ACETAMINOPHEN 1000 MG: 500 TABLET ORAL at 05:22

## 2019-06-29 RX ADMIN — CINACALCET HYDROCHLORIDE 60 MG: 30 TABLET, COATED ORAL at 05:22

## 2019-06-29 RX ADMIN — ATORVASTATIN CALCIUM 10 MG: 10 TABLET, FILM COATED ORAL at 17:13

## 2019-06-29 RX ADMIN — OXYCODONE HYDROCHLORIDE 5 MG: 5 TABLET ORAL at 23:24

## 2019-06-29 RX ADMIN — GABAPENTIN 100 MG: 100 CAPSULE ORAL at 17:13

## 2019-06-29 RX ADMIN — MORPHINE SULFATE 2 MG: 4 INJECTION INTRAVENOUS at 01:14

## 2019-06-29 RX ADMIN — PRAMIPEXOLE DIHYDROCHLORIDE 1 MG: 0.5 TABLET ORAL at 17:12

## 2019-06-29 RX ADMIN — METOPROLOL TARTRATE 25 MG: 25 TABLET, FILM COATED ORAL at 17:13

## 2019-06-29 RX ADMIN — BISACODYL 10 MG: 10 SUPPOSITORY RECTAL at 19:34

## 2019-06-29 RX ADMIN — DEXTROSE MONOHYDRATE 30 ML: 50 INJECTION, SOLUTION INTRAVENOUS at 17:20

## 2019-06-29 RX ADMIN — MAGNESIUM SULFATE IN WATER 2 G: 40 INJECTION, SOLUTION INTRAVENOUS at 07:54

## 2019-06-29 RX ADMIN — LIDOCAINE 2 PATCH: 50 PATCH CUTANEOUS at 10:19

## 2019-06-29 RX ADMIN — MAGNESIUM SULFATE IN WATER 4 G: 40 INJECTION, SOLUTION INTRAVENOUS at 10:26

## 2019-06-29 RX ADMIN — PRAMIPEXOLE DIHYDROCHLORIDE 1 MG: 0.5 TABLET ORAL at 05:22

## 2019-06-29 RX ADMIN — Medication 1 CAPSULE: at 07:54

## 2019-06-29 RX ADMIN — DEXTROSE MONOHYDRATE 30 ML: 50 INJECTION, SOLUTION INTRAVENOUS at 20:50

## 2019-06-29 RX ADMIN — METOPROLOL TARTRATE 25 MG: 25 TABLET, FILM COATED ORAL at 05:23

## 2019-06-29 RX ADMIN — AMLODIPINE BESYLATE 10 MG: 5 TABLET ORAL at 05:23

## 2019-06-29 RX ADMIN — SODIUM CHLORIDE 250 ML: 9 INJECTION, SOLUTION INTRAVENOUS at 21:36

## 2019-06-29 RX ADMIN — LOSARTAN POTASSIUM 50 MG: 50 TABLET ORAL at 05:22

## 2019-06-29 RX ADMIN — SODIUM CHLORIDE 500 ML: 9 INJECTION, SOLUTION INTRAVENOUS at 16:14

## 2019-06-29 RX ADMIN — SENNOSIDES, DOCUSATE SODIUM 1 TABLET: 50; 8.6 TABLET, FILM COATED ORAL at 21:00

## 2019-06-29 RX ADMIN — DOCUSATE SODIUM 100 MG: 100 CAPSULE, LIQUID FILLED ORAL at 17:13

## 2019-06-29 RX ADMIN — ACETAMINOPHEN 650 MG: 325 TABLET, FILM COATED ORAL at 17:11

## 2019-06-29 RX ADMIN — PRAMIPEXOLE DIHYDROCHLORIDE 1 MG: 0.5 TABLET ORAL at 12:39

## 2019-06-29 RX ADMIN — CEFAZOLIN SODIUM 2 G: 2 INJECTION, SOLUTION INTRAVENOUS at 01:09

## 2019-06-29 RX ADMIN — SODIUM CHLORIDE 500 MG: 9 INJECTION, SOLUTION INTRAVENOUS at 17:23

## 2019-06-29 RX ADMIN — MAGNESIUM HYDROXIDE 30 ML: 400 SUSPENSION ORAL at 17:13

## 2019-06-29 RX ADMIN — OXYCODONE HYDROCHLORIDE 5 MG: 5 TABLET ORAL at 10:09

## 2019-06-29 RX ADMIN — SUCRALFATE 1 G: 1 SUSPENSION ORAL at 23:27

## 2019-06-29 RX ADMIN — HYDRALAZINE HYDROCHLORIDE 10 MG: 20 INJECTION INTRAMUSCULAR; INTRAVENOUS at 10:10

## 2019-06-29 RX ADMIN — SUCRALFATE 1 G: 1 SUSPENSION ORAL at 17:17

## 2019-06-29 RX ADMIN — SUCRALFATE 1 G: 1 SUSPENSION ORAL at 05:23

## 2019-06-29 RX ADMIN — OXYCODONE HYDROCHLORIDE 10 MG: 10 TABLET, FILM COATED, EXTENDED RELEASE ORAL at 17:13

## 2019-06-29 RX ADMIN — OXYCODONE HYDROCHLORIDE 10 MG: 10 TABLET, FILM COATED, EXTENDED RELEASE ORAL at 05:22

## 2019-06-29 RX ADMIN — MAGNESIUM OXIDE TAB 400 MG (241.3 MG ELEMENTAL MG) 400 MG: 400 (241.3 MG) TAB at 17:13

## 2019-06-29 RX ADMIN — MAGNESIUM OXIDE TAB 400 MG (241.3 MG ELEMENTAL MG) 400 MG: 400 (241.3 MG) TAB at 05:22

## 2019-06-29 ASSESSMENT — ENCOUNTER SYMPTOMS
PALPITATIONS: 0
BLURRED VISION: 0
NERVOUS/ANXIOUS: 0
SINUS PAIN: 0
MYALGIAS: 0
FEVER: 0
WHEEZING: 0
FOCAL WEAKNESS: 0
STRIDOR: 0
DEPRESSION: 0
PHOTOPHOBIA: 0
WEIGHT LOSS: 0
BACK PAIN: 0
SENSORY CHANGE: 0
VOMITING: 0
DIZZINESS: 0
POLYDIPSIA: 0
TINGLING: 0
HEARTBURN: 0
DIAPHORESIS: 0
NAUSEA: 0
SPEECH CHANGE: 0
CHILLS: 0
BLOOD IN STOOL: 0
DOUBLE VISION: 0
BRUISES/BLEEDS EASILY: 0
HEADACHES: 0
SPUTUM PRODUCTION: 0
NECK PAIN: 0
WEAKNESS: 1
SHORTNESS OF BREATH: 0
COUGH: 0
HEMOPTYSIS: 0

## 2019-06-29 NOTE — PROGRESS NOTES
Infectious Disease Progress Note    Author: Karen Garcia M.D. Date & Time of service: 6/29/2019  12:47 PM    Chief Complaint:  Brain abscess, gluteal abscess  Vertebral OM    Interval History:  68 y.o. female admitted 5/29/2019 as a transfer from [facility] since 4/30/2019. + diabetes, SANTOSH of right hip with hardware, and breast cancer who was originally admitted to [facility] on 03/08/2019 for right hip and pelvic pain.  Work-up revealed a right iliopsoas lesion, gluteal abscess, and mult brain abscesses  6/1/2019-no fevers.  Continues to complain of significant pain in her hips.  Pain medications are being adjusted.  6/2/2019-no fevers.  Continues to remain off the antibiotics.  Awaiting Ortho evaluation  6/4/2019 patient febrile up to 103.  Having shaking chills.  Is not feeling well.  Denies any specific complaints.  6/10 AF WBC 5.3 somnolent No fever or new complaints  6/11 AF WBC 9 No fever or chills-some pain at surgical site. Limited participation with PT noted  6/12 AF WBC 7.3 somnolent but arousable-no new complaints  6/13 afebrile WBC 9.1 patient complaining of left hip pain.  Denies any headaches  6/14 afebrile, no CBC today.  Reports no new issues, tolerating antibiotics.  States the left hip pain is unchanged.  6/18 afebrile, WBC 6.9, HR 80s, -150s, on room air. Repeat CT on 6/17 showed no change in right iliac fluid collection, changes in right iliac/bilateral sacrum and left iliac suspicious of OM.   6/20- no fevers. C/o hip pain.   6/21/2019 no fevers.  Complains of some swelling at the right hip  6/22/2019-continues to complain of swelling of the right hip.  No fevers.  6/23/2019-no fevers.  The swelling is improved.  The ultrasound did not show any abscess or fluid collection.  6/24/2019 no fevers.  The hip pain is better.  No cough no shortness of breath  6/25/2019-no fevers.  The hip cultures remain negative.  She is scheduled for stereotactic biopsy on Friday.  6/26/2019 no fevers are  noted.  Patient complains of back pain.  In significant distress.  2019-no fevers.  Continues to complain of pain.  No new issues overnight   AF WBC 6.6 planned for MRIs and biopsy today-somnolent   AF WBC 7.7 obtunded prelim path showed fungus on brain specimen  Labs Reviewed, Medications Reviewed, and Wound Reviewed.    Review of Systems:  Review of Systems   Unable to perform ROS: Mental status change   Constitutional: Negative for fever.   Gastrointestinal: Negative for nausea.   Musculoskeletal: Positive for joint pain.       Hemodynamics:  Temp (24hrs), Av.6 °C (97.8 °F), Min:36.3 °C (97.3 °F), Max:36.9 °C (98.5 °F)  Temperature: 36.6 °C (97.9 °F), Monitored Temp: 35.6 °C (96.1 °F)  Pulse  Av.2  Min: 54  Max: 125Heart Rate (Monitored): 89  Blood Pressure : 132/64, NIBP: (!) 173/82       Physical Exam:  Physical Exam   Constitutional:   Thin   HENT:   Mouth/Throat: No oropharyngeal exudate.   Right parietal dressing   Eyes: Pupils are equal, round, and reactive to light. EOM are normal. No scleral icterus.   Neck: Neck supple. No JVD present.   Cardiovascular: Normal rate and regular rhythm.    No murmur heard.  Pulmonary/Chest: Effort normal. No stridor. She has no wheezes. She has no rales.   Abdominal: Soft. She exhibits no distension. There is no tenderness.   Musculoskeletal: She exhibits tenderness. She exhibits no edema.   Right hip with dressing.  No surrounding erythema   Neurological:   Obtunded  Moving all extremities   Skin: Skin is warm. No rash noted. She is not diaphoretic.   Nursing note and vitals reviewed.      Meds:    Current Facility-Administered Medications:   •  magnesium sulfate  •  Pharmacy Consult Request  •  labetalol  •  hydrALAZINE  •  levETIRAcetam (KEPPRA) IV  •  docusate sodium  •  senna-docusate  •  senna-docusate  •  polyethylene glycol/lytes  •  magnesium hydroxide  •  bisacodyl  •  fleet  •  Pharmacy  •  NS **AND** acetaminophen **AND** diphenhydrAMINE  **AND** D5W **AND** amphotericin b liposomal (AMBISOME) IV **AND** D5W **AND** NS  •  NS  •  oxyCODONE CR  •  gabapentin  •  magnesium oxide  •  morphine injection  •  lactobacillus rhamnosus  •  oxyCODONE immediate-release  •  sucralfate  •  acetaminophen  •  pramipexole  •  lidocaine  •  temazepam  •  cyclobenzaprine  •  Respiratory Care per Protocol  •  amLODIPine  •  cinacalcet  •  losartan  •  acetaminophen  •  atorvastatin  •  omeprazole  •  metoprolol  •  ondansetron  •  ondansetron    Labs:  Recent Labs      06/27/19 1123 06/28/19 1920 06/29/19   0410   WBC  6.6  1.5*  7.7   RBC  4.75  4.32  4.00*   HEMOGLOBIN  13.4  12.7  11.6*   HEMATOCRIT  42.4  37.8  34.6*   MCV  89.3  87.5  86.5   MCH  28.2  29.4  29.0   RDW  56.4*  54.1*  52.9*   PLATELETCT  369  221  293   MPV  9.4  9.1  9.7   NEUTSPOLYS  69.50  70.30   --    LYMPHOCYTES  15.70*  20.70*   --    MONOCYTES  10.40  2.70   --    EOSINOPHILS  3.30  1.80   --    BASOPHILS  0.80  1.80   --      Recent Labs      06/27/19 1123 06/28/19 1920 06/29/19   0410   SODIUM  132*  132*  137   POTASSIUM  4.9  4.5  4.0   CHLORIDE  99  101  101   CO2  25  21  25   GLUCOSE  85  123*  117*   BUN  17  25*  22     Recent Labs      06/27/19 1123 06/28/19 1920 06/29/19   0410   ALBUMIN   --   3.6   --    TBILIRUBIN   --   0.4   --    ALKPHOSPHAT   --   343*   --    TOTPROTEIN   --   7.7   --    ALTSGPT   --   9   --    ASTSGOT   --   28   --    CREATININE  0.65  0.90  0.71       Imaging:  MRI of the brain on 6/8/2019  Impression       1.  Interval progression of supra and infratentorial intra-axial nodules and associated edema. This short-term interval progression favors infection over neoplasm though both remain considerations.  2.  Mild atrophy         Micro:  Results     Procedure Component Value Units Date/Time    GRAM STAIN [[id number]] Collected:  06/28/19 9047    Order Status:  Completed Specimen:  Tissue Updated:  06/28/19 9215     Significant Indicator  .     Source TISS     Site Right Parietal Lesion     Gram Stain Result Rare WBCs.  No organisms seen.      Narrative:       Surgery Specimen    Cryptococcal Antigen Titer [[id number]]     Order Status:  Sent Specimen:  Blood from Blood     AFB Culture [[id number]] Collected:  06/28/19 1403    Order Status:  Completed Specimen:  Other Updated:  06/28/19 1509    Fungal Culture [[id number]] Collected:  06/28/19 1403    Order Status:  Completed Specimen:  Other Updated:  06/28/19 1509    Anaerobic Culture [[id number]] Collected:  06/28/19 1403    Order Status:  Completed Specimen:  Other Updated:  06/28/19 1509    CULTURE TISSUE W/ GRM STAIN [[id number]] Collected:  06/28/19 1403    Order Status:  Completed Specimen:  Other Updated:  06/28/19 1509    AFB Culture [[id number]] Collected:  06/19/19 0930    Order Status:  Completed Specimen:  Tissue from Other Body Fluid Updated:  06/25/19 0918     Significant Indicator NEG     Source TISS     Site Right Hip deep bone     Culture Result Culture in progress.     AFB Smear Results No acid fast bacilli seen.    Narrative:       Collected By:[id number] BEATRICE HARRIS  Bone biopsy right hip  Collected By:[id number] BEATRICE HARRIS  Right hip fluid collection  Collected By:[id number] BEATRICE HARRIS    Fungal Smear [[id number]] Collected:  06/19/19 0930    Order Status:  Completed Specimen:  Tissue from Other Body Fluid Updated:  06/25/19 0918     Significant Indicator NEG     Source TISS     Site Right Hip deep bone     Fungal Smear Results No fungal elements seen.    Narrative:       Collected By:[id number] BEATRICE HARRIS  Bone biopsy right hip  Collected By:[id number] BEATRICE HARRIS  Right hip fluid collection  Collected By:[id number] BEATRICE HARRIS    Fungal Culture [[id number]] Collected:  06/19/19 0930    Order Status:  Completed Specimen:  Tissue Updated:  06/25/19 0918     Significant Indicator NEG     Source TISS     Site Right Hip deep bone     Culture Result No fungal growth to date.    Narrative:        "Collected By:[id number] BEATRICE HARRIS  Bone biopsy right hip  Collected By:[id number] BEATRICE HARRIS  Right hip fluid collection  Collected By:[id number] BEATRICE HARRIS    CULTURE TISSUE W/ GRM STAIN [[id number]] Collected:  06/19/19 0930    Order Status:  Completed Specimen:  Tissue Updated:  06/25/19 0918     Significant Indicator NEG     Source TISS     Site Right Hip deep bone     Culture Result No growth at 72 hours.     Gram Stain Result No organisms seen.    Narrative:       Collected By:[id number] BEATRICE HARRIS  Bone biopsy right hip  Collected By:[id number] BEATRICE HARRIS  Right hip fluid collection  Collected By:[id number] BEATRICE HARRIS          Assessment:  Gluteal and iliopsoas abscess  Brain abscesses vs mets  Breast cancer  DM2    Plan:  Brain abscesses   CNS fungal infection, new  MRI 4/23 \"supra and infratentorial brain parenchyma. Decrease in the size of the lesions. Interval reduction in the extent of the surrounding white matter edema consistent with improvement/treatment response of the most of the multifocal brain abscesses.  MRI 5/21 showed innumerable microabscesses vs mets  Abx (vancomycin, cefepime and Flagyl) discontinued on 5/23 after ~8 weeks of treatment-developed new fevers on 6/4  MRI on 6/8 with interval progression of supra and infratentorial intra--axial nodules and associated edema.  New right thalamus lesion. Radiology favors infection over neoplasm though both remain considerations (had been off abx)  Mult blood cultures neg  CHAS neg 3/15 and 5/24  MRI 6/22 worsening CNS lesiona  s/p biopsy today- fungal +  Started on liposomal Ampho B pending ID of above  Check electrolytes daily and replace as needed    Gluteal and iliopsoas abscess   New OM L5, S1-3  New abscesses  Adjacent to hardware in right hip (placed 12/17/18)  CT 4/23 \"Fluid collections within the right gluteal and iliacis muscles.. The collection within the right gluteal muscle has unchanged while the fluid collection within the right iliacis " "muscle is increased somewhat in size.\" 2.7 cm  Cultures 3/29 and 4/4 neg  MRI on 5/20/2019 - interval decrease in collection in R gluteal muscle and persistent multiloculated collection in R iliacus muscle.   CT 5/28 no change  s/p drainage on 5/30/2019-cultures negative  S/p removal hardware 6/5-no cultures done  Panculture neg; 1, 3 beta glucan neg  S/p removal hardware 6/5/19  CT on 6/8 with residual abscess in the right iliacus muscle, 2.5 x 1.8 cm, slightly smaller than prior  s/p CT-guided deep bone biopsy 6/19/2019.  Cultures remain negative; path not reported   MRI 6/28 chronic discitis, diffuse OM L5, 3 and 4 cm abscesses right glute, 1.5 cm abscess or necrosis right psoterior ileum, 3.3 cm abscess right SI joint  Ampho B as above  Drain if feasible-repeat all cultures    Type 2 DM  Hemoglobin A1c 6.3   Keep BS under 150 to help control current infection    Latent TB  Check AFB as above    CIRA RN                  "

## 2019-06-29 NOTE — PROGRESS NOTES
2 RN skin check complete with PATRICK Plata.  Devices in place nasal canula, cardiac monitor, BP cuff, pulse ox, SCDs, payne, PIV x2.   Skin assessed under the following devices all of the above.   Preventative measures in place including mepilex on sacrum, and b/l heels.  Following areas of concern:   · Surgical incision on right head, dressing CDI  - Old healed incision on right hip  - Discoloration on b/l LE  - Abrasions on right posterior and lateral achilles    The following interventions in place mepilex placed on b/l heels, patient has restless legs and constant friction.     Wound consult placedYES/NO: N\A    Wound reported YES/NO: no and N\A  Appropriate LDAs opened YES/NO: N\A

## 2019-06-29 NOTE — CARE PLAN
Problem: Mobility  Goal: Risk for activity intolerance will decrease    Intervention: Encourage patient to increase activity level in collaboration with Interdisciplinary Team  Will encourage/educate patient to increase activity level and mobilize to EOB today.       Problem: Pain Management  Goal: Pain level will decrease to patient's comfort goal    Intervention: Follow pain managment plan developed in collaboration with patient and Interdisciplinary Team  Multiple PRN medications available to manage patients pain level. CPOT assessment tool in use to assess patients pain level. Scheduled oxy and tylenol also in use to manage pain levels

## 2019-06-29 NOTE — ASSESSMENT & PLAN NOTE
Likely fungal infection  Multiple courses antibiotics  Amphotericin b  Pending differentiation of type  On ancef

## 2019-06-29 NOTE — ASSESSMENT & PLAN NOTE
S/p biopsy  Fungal 6/28  Amphotericin b  q 1 hour neuro checks, may be relaxed today as continues to be stable

## 2019-06-29 NOTE — PROGRESS NOTES
Hospital Medicine Daily Progress Note    Date of Service  6/29/2019    Chief Complaint  persistent abscess.    Hospital Course     Ms. Martini is a 68-year-old female who was transferred from [facility] on 5/29/2019 with persistent right gluteal abscesses since sacral fracture repair in December.  She has had multiple IR drainage.  She initially presented with pelvic pain, low back pain, and confusion. She also had new slurred speech. Imaging of the abdomen, pelvis and brain revealed abscesses and she was treated with a full course of IV antibiotics per ID.  Her slurred speech improved and she was able to ambulate with physical therapy. However, repeat imaging showed persistent pelvic abscesses.  MRI brain shows increase in brain lesions.  Her brain lesions are known from prior imaging and there is concern they represent metastasis.  Oncology aware and do not recommend further imaging. Cultures remained negative and a CHAS done by Dr. Potter showed no vegetations. The size of the pelvic abscesses was reviewed by interventional radiology and the case was discussed with Dr. Finley who recommended transfer to [facility] for CT guided drainage. She underwent drainage on 5/30. On 6/4, she became febrile again, so restarted on cefepime and vancomycin per ID and has been on them since.  Right hip hardware was removed on 6/5. Repeat MRI brain on 6/7/19 saw progression of the supra and infratentorial intra-axial nodules with edema, prompting a Neurosurgery consult.  Dr. Nguyen stated lesions are not amendable to biopsy stereotactically, and favors infectious etiology, recommended continued antibiotics, antineuroleptic and repeat brain MRI in 2 weeks (around 6/21).  Repeat CT pelvis on 6/9 showed residual fluid collection 2.5 x 1.8 cm in right iliacus muscle., slightly smaller than before.  Cefepime + vanc were continue. Work up was broadened to include fungal etiology, and she is started on empiric started fluconazole. Repeat CT on 6/17  showed no significant interval change in the size of the right iliacus muscle fluid collections, with changes in the right iliac bone, bilateral sacrum, and left iliac bone suspicious for osteomyelitis, and changes at L5-S1 suspicious for discitis/osteomyelitis, along with hyperdense right gluteal lesion, moderately suspicious for postoperative pseudoaneurysm with adjacent hematoma. IR felt aspiration of the fluid will have low yield, and so recommended biopsy of the nonhealing right iliac fracture area instead. Biopsy of the hip showed no AFB, organism, or fungal elements. MRI of the brain showed innumerable supra and infratentorial enhancing nodules again noted, with multiple lesions appearing somewhat larger and with increased enhancement.      Interval Problem Update    Underwent brain Bx with path conerning for fungal infection started of ambisome  SR 70's  's   On Q1  Neuro checks  AOx4  Restless follows commands  On 1 L NC  Last BM 6-23        Consultants/Specialty  Infectious disease  Orthopedic surgery  Neurosurgery, Dr. Nguyen  IR    Code Status  DNR/DNI    Disposition  TBD    Review of Systems  Review of Systems   Unable to perform ROS: Mental acuity        Physical Exam  Temp:  [36.3 °C (97.3 °F)-36.7 °C (98 °F)] (P) 36.6 °C (97.8 °F)  Pulse:  [73-96] 86  Resp:  [14-53] 19  SpO2:  [84 %-100 %] 99 %    Physical Exam   Constitutional: She is oriented to person, place, and time. She appears well-developed and well-nourished.   HENT:   Head: Normocephalic.   Right Ear: External ear normal.   Left Ear: External ear normal.   Mouth/Throat: No oropharyngeal exudate.   Surgical wound dressed   Eyes: Conjunctivae are normal. Right eye exhibits no discharge. Left eye exhibits no discharge. No scleral icterus.   Neck: Neck supple. No JVD present. No tracheal deviation present.   Cardiovascular: Normal rate and regular rhythm.  Exam reveals no gallop and no friction rub.    No murmur heard.  Pulmonary/Chest:  Effort normal. No stridor. No respiratory distress. She has decreased breath sounds. She has no rales. She exhibits no tenderness.   Abdominal: Soft. Bowel sounds are normal. She exhibits no distension. There is no tenderness. There is no rebound.   Musculoskeletal: She exhibits edema. She exhibits no tenderness.   Neurological: She is oriented to person, place, and time. No cranial nerve deficit. She exhibits normal muscle tone.   Asleep arousable  Follows command in all extremities   Skin: Skin is warm and dry. She is not diaphoretic. No cyanosis. Nails show no clubbing.   Psychiatric: Her speech is delayed. She is slowed.   restless   Nursing note and vitals reviewed.      Fluids    Intake/Output Summary (Last 24 hours) at 06/29/19 0803  Last data filed at 06/29/19 0600   Gross per 24 hour   Intake          2701.66 ml   Output             1620 ml   Net          1081.66 ml       Laboratory  Recent Labs      06/27/19   1123  06/28/19   1920  06/29/19   0410   WBC  6.6  1.5*  7.7   RBC  4.75  4.32  4.00*   HEMOGLOBIN  13.4  12.7  11.6*   HEMATOCRIT  42.4  37.8  34.6*   MCV  89.3  87.5  86.5   MCH  28.2  29.4  29.0   MCHC  31.6*  33.6  33.5*   RDW  56.4*  54.1*  52.9*   PLATELETCT  369  221  293   MPV  9.4  9.1  9.7     Recent Labs      06/27/19   1123  06/28/19   1920  06/29/19   0410   SODIUM  132*  132*  137   POTASSIUM  4.9  4.5  4.0   CHLORIDE  99  101  101   CO2  25  21  25   GLUCOSE  85  123*  117*   BUN  17  25*  22   CREATININE  0.65  0.90  0.71   CALCIUM  12.3*  12.4*  10.3     Recent Labs      06/27/19   1123   APTT  32.5   INR  1.02               Imaging    1.  Grade 2-3 spondylolisthesis at the L5-S1 level with bilateral spondylolysis of the pars interarticularis. This causes severe bilateral neural foraminal stenosis at this level.    2.  Interval removal of posterior fusion hardware at the lumbosacral junction.    3.  Diffuse osteomyelitis involving the L5 vertebral body and the first 3 sacral  segments.    4.  Chronic discitis at the L5-S1 level with anterior paraspinous abscess at this level and anterior to the S1 sacral segment.    5.  Smaller intraosseous bone bone abscesses or areas of necrosis noted in the sacrum.    6.  There is also evidence of osteomyelitis involving the jose antonio and sacrum bilaterally along the course of the previous sacral fixation screw. There is a large 4 cm abscess noted in the right gluteal soft tissues in a smaller 3 cm chronic appearing   abscess in the left gluteal musculature.    7.  There is a 1.5 cm intraosseous abscess or area of necrosis in the right posterior ilium.    8.  There is a 3.3 cm centimeters multiloculated abscess anterior to the right sacroiliac joint    Assessment/Plan  * Abscess of right iliac muscle and right gluteus medius muscle- (present on admission)   Assessment & Plan    - Recurrent issue since sacral fracture repair in December 2018. Has had multiple IR drainage and antibiotics. Recent cultures remain unrevealing.    - s/p biopsy of the nonhealing right iliac fracture area. Showed no AFB, organism, or fungal elements.  Cultures negative.      Started on amphotericin B  Ortho and ID following      Brain lesion- (present on admission)   Assessment & Plan    Status post brain biopsy  Intra-Op pathology consistent with fungal infection patient started on AmBisome follow-up on final results  ID and neurosurgery following  Continue Keppra     Sepsis (HCC)   Assessment & Plan    -This is sepsis (without associated acute organ dysfunction).      Sepsis resolved  Continue amphotericin and resume and follow-up on final intraoperative cultures     Hypomagnesemia- (present on admission)   Assessment & Plan    Replete with IV magnesium sulfate  Closely monitor electrolytes and replete with amphotericin therapy     Hypercalcemia- (present on admission)   Assessment & Plan    Improved with hydration continue to monitor levels     Acquired circulating  anticoagulants (HCC)- (present on admission)   Assessment & Plan    Anticoagulation on hold given recent brain biopsy     Non-severe protein-calorie malnutrition (HCC)- (present on admission)   Assessment & Plan    Advance diet as tolerated  Dietary supplements       History of DVT (deep vein thrombosis)- (present on admission)   Assessment & Plan    Hold anticoagulation given recent brain biopsy and lumbar osteomyelitis     Essential hypertension- (present on admission)   Assessment & Plan    Continue metoprolol losartan and amlodipine  Monitor blood pressure and treat accordingly     Chronic hyponatremia- (present on admission)   Assessment & Plan    Improved continue to monitor      RLS (restless legs syndrome)- (present on admission)   Assessment & Plan    On Mirapex     Chronic pain- (present on admission)   Assessment & Plan    Continue pain management       History of breast cancer- (present on admission)   Assessment & Plan    -S/p left mastectomy and breast implant.  Follow-up on intraoperative final pathology results     COPD (chronic obstructive pulmonary disease) (HCC)- (present on admission)   Assessment & Plan    RT protocol and oxygen          VTE prophylaxis: SCDs

## 2019-06-29 NOTE — PROGRESS NOTES
Neurosurgery Progress Note    Subjective:  D/W RN.  No issues.    Exam:  Awakens.  Spont eo.  FC    Pulse  Av.6  Min: 73  Max: 96  Resp  Av.5  Min: 9  Max: 53  Temp  Av.5 °C (97.7 °F)  Min: 36.3 °C (97.3 °F)  Max: 36.7 °C (98 °F)  Monitored Temp 2  Av.7 °C (96.3 °F)  Min: 35.5 °C (95.9 °F)  Max: 36.1 °C (97 °F)  SpO2  Av.3 %  Min: 84 %  Max: 100 %    No Data Recorded    Recent Labs      19   1123  06/28/19   1920  06/29/19   0410   WBC  6.6  1.5*  7.7   RBC  4.75  4.32  4.00*   HEMOGLOBIN  13.4  12.7  11.6*   HEMATOCRIT  42.4  37.8  34.6*   MCV  89.3  87.5  86.5   MCH  28.2  29.4  29.0   MCHC  31.6*  33.6  33.5*   RDW  56.4*  54.1*  52.9*   PLATELETCT  369  221  293   MPV  9.4  9.1  9.7     Recent Labs      19   0410   SODIUM  132*  132*  137   POTASSIUM  4.9  4.5  4.0   CHLORIDE  99  101  101   CO2  25  21  25   GLUCOSE  85  123*  117*   BUN  17  25*  22   CREATININE  0.65  0.90  0.71   CALCIUM  12.3*  12.4*  10.3     Recent Labs      19   1123   APTT  32.5   INR  1.02           Intake/Output       19 - 19 - 19 0659       Total [id number] Total       Intake    P.O.  60  -- 60  20  -- 20    P.O. 60 -- 60 20 -- 20    I.V.  948.3  1510 2458.3  315  -- 315    Magnesium Sulfate Volume -- -- -- 115 -- 115    Volume (mL) (lactated ringers infusion) 400 -- 400 -- -- --    Volume (mL) (lactated ringers infusion) 0 -- 0 -- -- --    Volume (mL) (NS infusion 500 mL) 500 -- 500 0 -- 0    Volume (mL) (D5W infusion 30 mL) 0 30 30 0 -- 0    Volume (mL) (D5W infusion 30 mL) 0 30 30 0 -- 0    Volume (mL) (NS infusion 250 mL) 0 250 250 0 -- 0    Volume (mL) (0.9 % NaCl with KCl 20 mEq infusion) 0 -- 0 -- -- --    Volume (mL) (NS infusion) 48.3 1200 1248.3 200 -- 200    IV Piggyback  100  350 450  100  -- 100    Volume (mL) (NS (BOLUS) infusion 500 mL) 0 -- 0 -- -- --    Volume (mL)  (amphotericin B liposome (AMBISOME) 285.5 mg in D5W 150 mL IVPB) 0 150 150 0 -- 0    Volume (mL) (ceFAZolin in dextrose (ANCEF) IVPB premix 2 g) 0 200 200 0 -- 0    Volume (mL) (levETIRAcetam (KEPPRA) 500 mg in  mL IVPB) 100 -- 100 100 -- 100    Total Intake 1108.3 1860 2968.3 435 -- 435       Output    Urine  185  1435 1620  190  -- 190    Number of Times Voided 1 x -- 1 x -- -- --    Urine Void (mL) 50 -- 50 -- -- --    Output (mL) (Urethral Catheter Latex 16 Fr.) 135 1435 1570 190 -- 190    Stool  --  -- --  --  -- --    Number of Times Stooled 0 x -- 0 x 0 x -- 0 x    Total Output 185 1435 1620 190 -- 190       Net I/O     923.3 425 1348.3 245 -- 245            Intake/Output Summary (Last 24 hours) at 06/29/19 0950  Last data filed at 06/29/19 0800   Gross per 24 hour   Intake          3403.33 ml   Output             1810 ml   Net          1593.33 ml            • magnesium sulfate  2 g Once   • magnesium sulfate  4 g Once   • Pharmacy Consult Request  1 Each PHARMACY TO DOSE   • labetalol  10 mg Q HOUR PRN   • hydrALAZINE  10 mg Q HOUR PRN   • levETIRAcetam (KEPPRA) IV  500 mg Q12HRS   • docusate sodium  100 mg BID   • senna-docusate  1 Tab Nightly   • senna-docusate  1 Tab Q24HRS PRN   • polyethylene glycol/lytes  1 Packet BID PRN   • magnesium hydroxide  30 mL QDAY PRN   • bisacodyl  10 mg Q24HRS PRN   • fleet  1 Each Once PRN   • Pharmacy  1 Each PRN   • NS  500 mL Q24HR    And   • acetaminophen  650 mg Q24HR    And   • diphenhydrAMINE  25 mg Q24HR    And   • D5W  30 mL Q24HR    And   • amphotericin b liposomal (AMBISOME) IV  5 mg/kg Q24HR    And   • D5W  30 mL Q24HR    And   • NS  250 mL Q24HR   • NS   Continuous   • oxyCODONE CR  10 mg Q12HRS   • gabapentin  100 mg TID   • magnesium oxide  400 mg BID   • morphine injection  2 mg Q2HRS PRN   • lactobacillus rhamnosus  1 Cap QDAY with Breakfast   • oxyCODONE immediate-release  5-10 mg Q4HRS PRN   • sucralfate  1 g Q6HRS   • acetaminophen  1,000 mg BID    • pramipexole  1 mg TID   • lidocaine  2 Patch Q24HR   • temazepam  15 mg QHS PRN   • cyclobenzaprine  10 mg TID PRN   • Respiratory Care per Protocol   Continuous RT   • amLODIPine  10 mg Q DAY   • cinacalcet  60 mg DAILY   • losartan  50 mg Q DAY   • acetaminophen  650 mg Q6HRS PRN   • atorvastatin  10 mg Q EVENING   • omeprazole  20 mg DAILY   • metoprolol  25 mg TWICE DAILY   • ondansetron  4 mg Q4HRS PRN   • ondansetron  4 mg Q4HRS PRN     Neuro stable.  OK to floor from NS standpoint.

## 2019-06-29 NOTE — PROGRESS NOTES
Critical Care Progress Note    Date of admission  5/29/2019    Chief Complaint  68 y.o. female who presented 5/29/2019 with prior back surgery.  She has right gluteal abscesses since sacral fracture repair in December.  Multiple IR drainage.  Initially presented with pelvic pain and low back pain and confusion.  Treated per IDwith antibiotics.  MRI showed brain lesions.  Cultures remained negative, negative CHAS for vegetations.  Changes l5s1 suspcisious for osteomyelitis.  Biopsy negative of right hip.  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  Pending work up for differentiation. Started on amphotericin b. Needs critical care monitoring.      Hospital Course          Interval Problem Update  Reviewed last 24 hour events:  q 1 hour neuro check  Fungal work up pending  No acute focal changes  Improved calcium  Hr 70s  sbp 120-130s, keep < 160 as normal blood pressure control, mult medications  Ativan prn, oxy prn  Agitated overnight, ? Sundowning  2 L nc overnight, ok to wean for sao2 > 88%  Bowel movement 23rd, advance bm  No respiratory  Off xarelto  Following ns  prilosec home med  Replace mg this am, mg 1.9, on oral  No hx gi bleed  Hx gerd  Monitor ca for now        Review of Systems  Review of Systems   Constitutional: Positive for malaise/fatigue. Negative for chills, diaphoresis, fever and weight loss.   HENT: Negative for congestion and sinus pain.    Eyes: Negative for blurred vision, double vision and photophobia.   Respiratory: Negative for cough, hemoptysis, sputum production, shortness of breath, wheezing and stridor.    Cardiovascular: Negative for chest pain, palpitations and leg swelling.   Gastrointestinal: Negative for blood in stool, heartburn, melena and vomiting.   Genitourinary: Negative for dysuria and urgency.   Musculoskeletal: Negative for back pain, myalgias and neck pain.   Skin: Negative for itching and rash.   Neurological: Positive for weakness. Negative for dizziness, tingling,  sensory change, speech change, focal weakness and headaches.   Endo/Heme/Allergies: Negative for polydipsia. Does not bruise/bleed easily.   Psychiatric/Behavioral: Negative for depression. The patient is not nervous/anxious.         Vital Signs for last 24 hours   Temp:  [36.1 °C (96.9 °F)-36.7 °C (98 °F)] 36.7 °C (98 °F)  Pulse:  [73-96] 86  Resp:  [14-53] 19  BP: (104)/(56) 104/56  SpO2:  [84 %-100 %] 99 %    Hemodynamic parameters for last 24 hours       Respiratory Information for the last 24 hours       Physical Exam   Physical Exam   Constitutional: She is oriented to person, place, and time. No distress.   Ill appearing, fatigued   HENT:   Head: Normocephalic and atraumatic.   Right Ear: External ear normal.   Left Ear: External ear normal.   Mouth/Throat: No oropharyngeal exudate.   Eyes: Pupils are equal, round, and reactive to light. Conjunctivae and EOM are normal. Right eye exhibits no discharge. Left eye exhibits no discharge.   Neck: No JVD present. No tracheal deviation present.   Cardiovascular: Normal rate, regular rhythm and normal heart sounds.    No murmur heard.  Pulmonary/Chest: Effort normal and breath sounds normal. No stridor. No respiratory distress. She has no wheezes. She has no rales. She exhibits no tenderness.   Abdominal: She exhibits no mass. There is no tenderness. There is no rebound and no guarding.   Musculoskeletal: She exhibits no edema, tenderness or deformity.   Neurological: She is alert and oriented to person, place, and time. She displays normal reflexes. No cranial nerve deficit. Coordination normal.   Skin: Skin is warm and dry. No rash noted. She is not diaphoretic. No erythema.   Psychiatric: She has a normal mood and affect. Her behavior is normal.   Nursing note and vitals reviewed.      Medications  Current Facility-Administered Medications   Medication Dose Route Frequency Provider Last Rate Last Dose   • Pharmacy Consult Request ...Pain Management Review 1 Each   1 Each Other PHARMACY TO DOSE Mell Menchaca A.P.N.       • labetalol (NORMODYNE,TRANDATE) injection 10 mg  10 mg Intravenous Q HOUR PRN Mell Menchaca A.P.N.       • hydrALAZINE (APRESOLINE) injection 10 mg  10 mg Intravenous Q HOUR PRN Mell Menchaca A.P.N.       • levETIRAcetam (KEPPRA) 500 mg in  mL IVPB  500 mg Intravenous Q12HRS Mell Menchaca A.P.N.   Stopped at 06/29/19 0538   • docusate sodium (COLACE) capsule 100 mg  100 mg Oral BID Mell Menchaca A.P.N.   100 mg at 06/29/19 0523   • senna-docusate (PERICOLACE or SENOKOT S) 8.6-50 MG per tablet 1 Tab  1 Tab Oral Nightly Mell Menchaca A.P.N.   1 Tab at 06/28/19 2118   • senna-docusate (PERICOLACE or SENOKOT S) 8.6-50 MG per tablet 1 Tab  1 Tab Oral Q24HRS PRN Mell Menchaca A.P.N.       • polyethylene glycol/lytes (MIRALAX) PACKET 1 Packet  1 Packet Oral BID PRN Mell Menchaca A.P.N.   1 Packet at 06/28/19 2121   • magnesium hydroxide (MILK OF MAGNESIA) suspension 30 mL  30 mL Oral QDAY PRN Mell Menchaca, A.P.N.       • bisacodyl (DULCOLAX) suppository 10 mg  10 mg Rectal Q24HRS PRN Mell Menchaca A.P.N.       • fleet enema 133 mL  1 Each Rectal Once PRN Mell Menchaca A.P.N.       • Pharmacy Consult Request for Amphotericin B monitoring  1 Each Other PRN Karen Garcia M.D.       • NS infusion 500 mL  500 mL Intravenous Q24HR Karen Garcia M.D. 500 mL/hr at 06/28/19 1732 500 mL at 06/28/19 1732    And   • acetaminophen (TYLENOL) tablet 650 mg  650 mg Oral Q24HR Karen Garcia M.D.   650 mg at 06/28/19 1808    And   • diphenhydrAMINE (BENADRYL) tablet/capsule 25 mg  25 mg Oral Q24HR Karen Garcia M.D.   25 mg at 06/28/19 1809    And   • D5W infusion 30 mL  30 mL Intravenous Q24HR Karen Garcia M.D.   Stopped at 06/28/19 1905    And   • amphotericin B liposome (AMBISOME) 285.5 mg in D5W 150 mL IVPB  5 mg/kg Intravenous Q24HR Karen Garcia M.D.   Stopped at  06/28/19 2105    And   • D5W infusion 30 mL  30 mL Intravenous Q24HR Karen Garcia M.D.   Stopped at 06/28/19 2205    And   • NS infusion 250 mL  250 mL Intravenous Q24HR Karen Garcia M.D.   Stopped at 06/28/19 2304   • NS infusion   Intravenous Continuous Enrique K Rita 100 mL/hr at 06/28/19 1731     • oxyCODONE CR (OXYCONTIN) tablet 10 mg  10 mg Oral Q12HRS Chrissie Wells M.D.   10 mg at 06/29/19 0522   • gabapentin (NEURONTIN) capsule 100 mg  100 mg Oral TID Chrissie Wells M.D.   100 mg at 06/29/19 0522   • LORazepam (ATIVAN) tablet 1 mg  1 mg Oral QHS PRN Ignacio Barkley M.D.   1 mg at 06/28/19 1935   • magnesium oxide (MAG-OX) tablet 400 mg  400 mg Oral BID Ignacio Barkley M.D.   400 mg at 06/29/19 0522   • morphine (pf) 4 mg/ml injection 2 mg  2 mg Intravenous Q2HRS PRN Ignacio Barkley M.D.   2 mg at 06/29/19 0114   • lactobacillus rhamnosus (CULTURELLE) capsule 1 Cap  1 Cap Oral QDAY with Breakfast Ignacio Barkley M.D.   Stopped at 06/28/19 0730   • oxyCODONE immediate-release (ROXICODONE) tablet 5-10 mg  5-10 mg Oral Q4HRS PRN Calvin Woods M.D.   10 mg at 06/28/19 2210   • sucralfate (CARAFATE) 1 GM/10ML suspension 1 g  1 g Oral Q6HRS Calvin Woods M.D.   1 g at 06/29/19 0523   • acetaminophen (TYLENOL) tablet 1,000 mg  1,000 mg Oral BID Dipak Almonte M.D.   1,000 mg at 06/29/19 0522   • pramipexole (MIRAPEX) tablet 1 mg  1 mg Oral TID Adryan Murrell, A.P.R.N.   1 mg at 06/29/19 0522   • lidocaine (LIDODERM) 5 % 2 Patch  2 Patch Transdermal Q24HR Adryan Murrell, A.P.R.N.   2 Patch at 06/28/19 2220   • temazepam (RESTORIL) capsule 15 mg  15 mg Oral QHS PRN Adryan Murrell, A.P.R.N.   15 mg at 06/26/19 2222   • cyclobenzaprine (FLEXERIL) tablet 10 mg  10 mg Oral TID PRN Adryan Murrell, A.P.R.N.   10 mg at 06/27/19 0938   • Respiratory Care per Protocol   Nebulization Continuous RT Adryan Murrell, A.P.R.N.       • amLODIPine (NORVASC) tablet 10 mg  10 mg Oral Q DAY Odalys Mitchell M.D.   10 mg at  06/29/19 0523   • cinacalcet (SENSIPAR) tablet 60 mg  60 mg Oral DAILY Odalys Mitchell M.D.   60 mg at 06/29/19 0522   • losartan (COZAAR) tablet 50 mg  50 mg Oral Q DAY Odalys Mitchell M.D.   50 mg at 06/29/19 0522   • acetaminophen (TYLENOL) tablet 650 mg  650 mg Oral Q6HRS PRN Odalys Mitchell M.D.   650 mg at 06/20/19 1136   • atorvastatin (LIPITOR) tablet 10 mg  10 mg Oral Q EVENING Odalys Mitchell M.D.   10 mg at 06/28/19 1813   • omeprazole (PRILOSEC) capsule 20 mg  20 mg Oral DAILY Odalys Mitchell M.D.   20 mg at 06/29/19 0522   • metoprolol (LOPRESSOR) tablet 25 mg  25 mg Oral TWICE DAILY Odalys Mitchell M.D.   25 mg at 06/29/19 0523   • ondansetron (ZOFRAN) syringe/vial injection 4 mg  4 mg Intravenous Q4HRS PRJUANCARLOS Parish M.D.   4 mg at 06/28/19 1320   • ondansetron (ZOFRAN ODT) dispertab 4 mg  4 mg Oral Q4HRS PRJUANCARLOS Parish M.D.   4 mg at 06/03/19 1835       Fluids    Intake/Output Summary (Last 24 hours) at 06/29/19 0640  Last data filed at 06/29/19 0600   Gross per 24 hour   Intake          2701.66 ml   Output             1620 ml   Net          1081.66 ml       Laboratory          Recent Labs      06/27/19   1123  06/28/19   1920  06/29/19   0410   SODIUM  132*  132*  137   POTASSIUM  4.9  4.5  4.0   CHLORIDE  99  101  101   CO2  25  21  25   BUN  17  25*  22   CREATININE  0.65  0.90  0.71   MAGNESIUM   --   1.7  1.3*   PHOSPHORUS   --   3.9   --    CALCIUM  12.3*  12.4*  10.3     Recent Labs      06/27/19   1123  06/28/19   1920  06/29/19   0410   ALTSGPT   --   9   --    ASTSGOT   --   28   --    ALKPHOSPHAT   --   343*   --    TBILIRUBIN   --   0.4   --    GLUCOSE  85  123*  117*     Recent Labs      06/27/19   1123  06/28/19   1920  06/29/19   0410   WBC  6.6  1.5*  7.7   NEUTSPOLYS  69.50  70.30   --    LYMPHOCYTES  15.70*  20.70*   --    MONOCYTES  10.40  2.70   --    EOSINOPHILS  3.30  1.80   --    BASOPHILS  0.80  1.80   --    ASTSGOT   --   28   --    ALTSGPT   --   9   --     ALKPHOSPHAT   --   343*   --    TBILIRUBIN   --   0.4   --      Recent Labs      06/27/19   1123  06/28/19   1920  06/29/19   0410   RBC  4.75  4.32  4.00*   HEMOGLOBIN  13.4  12.7  11.6*   HEMATOCRIT  42.4  37.8  34.6*   PLATELETCT  369  221  293   PROTHROMBTM  13.6   --    --    APTT  32.5   --    --    INR  1.02   --    --        Imaging  X-Ray:  I have personally reviewed the images and compared with prior images.  EKG:  I have personally reviewed the images and compared with prior images.  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  Post biopsy  Amphotericin b  pending fungal work up for differentiation  cefazolin     Hypercalcemia- (present on admission)   Assessment & Plan    Reassess chemistry  On pramipexole  Daily calcium level  May need to adjust dosing  Given dose calcitonin overnight  Improved to normal levels  On normal saline     Essential hypertension- (present on admission)   Assessment & Plan    Keep sbp < 160  Continue amlodipine, losartan and metoprolol     Chronic hyponatremia- (present on admission)   Assessment & Plan    Daily chem, mild  On normal saline     COPD (chronic obstructive pulmonary disease) (HCC)- (present on admission)   Assessment & Plan    Prn albuterol          VTE:  Contraindicated  Ulcer: Not Indicated  Lines: None    I have performed a physical exam and reviewed and updated ROS and Plan today (6/29/2019). In review of yesterday's note (6/28/2019), there are no changes  except as documented above.     Discussed patient condition and risk of morbidity and/or mortality with Hospitalist, Family, RN, RT, Pharmacy, , Patient and infectious disease and neurosurgery     The patient is critically ill.  She is now in the ICU post intracranial brain biopsy with confirmed fungal infection.  She requires very close neurological observation with q 1 hour neuro checks.  I have assessed her blood pressure, hemodynamics, cardiovascular status and neurologic status.  There is high risk of deterioration and worsening vital organ dysfunction and death without the above critical care interventions.  Critical care time = 32 minutes in directly providing and coordinating critical care and extensive data review.  No time overlap and excludes procedures.

## 2019-06-29 NOTE — PROGRESS NOTES
2 RN skin check performed.     R sided crani/nirali hole present, SHAREE site, gauze/cloth tape dressing intact w/ old drainage noted    Redness noted to R lateral/posterior ankle/achilles, mepilex applied to ankle/heel    Hyperpigmented/dusky LLE ankle, mepilex applied to ankle/heel    Bilateral feet dry/cracking    No other areas of concern noted at this time

## 2019-06-29 NOTE — CARE PLAN
Problem: Infection  Goal: Will remain free from infection    Intervention: Implement standard precautions and perform hand washing before and after patient contact  Standard precautions and hand hygiene performed before, during and after each patient interaction       Problem: Venous Thromboembolism (VTW)/Deep Vein Thrombosis (DVT) Prevention:  Goal: Patient will participate in Venous Thrombosis (VTE)/Deep Vein Thrombosis (DVT)Prevention Measures    Intervention: Ensure patient wears graduated elastic stockings (FARIDA hose) and/or SCDs, if ordered, when in bed or chair (Remove at least once per shift for skin check)  SCDs will remain in place and on for extent of time patient remains in bed except when performing 2 RN skin check, ambulation, and/or bed bath

## 2019-06-29 NOTE — ASSESSMENT & PLAN NOTE
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

## 2019-06-29 NOTE — ASSESSMENT & PLAN NOTE
Reassess chemistry  On sensivar  Daily calcium level  May need to adjust dosing  Given dose calcitonin overnight  Improved to normal levels  On normal saline

## 2019-06-29 NOTE — CONSULTS
Critical Care Consultation    Date of consult: 6/28/2019    Referring Physician  CLEMENT Chavez*    Reason for Consultation  Post intracranial biopsy    History of Presenting Illness  68 y.o. female who presented 5/29/2019 with prior back surgery.  She has right gluteal abscesses since sacral fracture repair in December.  Multiple IR drainage.  Initially presented with pelvic pain and low back pain and confusion.  Treated per IDwith antibiotics.  MRI showed brain lesions.  Cultures remained negative, negative HALIE for vegetations.  Changes l5s1 suspcisious for osteomyelitis.  Biopsy negative of right hip.  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  Pending work up for differentiation. Started on amphotericin b. Needs critical care monitoring.      Code Status  DNAR/DNI    Review of Systems  Review of Systems   Unable to perform ROS: Medical condition   Answered simple questions, no chest pain, vision changes, headache, sob, abdominal pain or sensory changes.  Follows simple commands    Past Medical History   has a past medical history of Cancer (HCC); COPD (chronic obstructive pulmonary disease) (HCC); Diabetes (HCC); GERD (gastroesophageal reflux disease); Hyperlipidemia; and Hypertension.    Surgical History   has a past surgical history that includes si screw (Right, 12/17/2018); other; halie (N/A, 5/24/2019); and si screw (Right, 6/5/2019).    Family History  family history is not on file.    Social History   reports that she has never smoked. She has never used smokeless tobacco. She reports that she drinks alcohol. She reports that she does not use drugs.    Medications  Home Medications     Reviewed by Nasrin Renee R.N. (Registered Nurse) on 06/28/19 at 1107  Med List Status: Complete   Medication Last Dose Status   amLODIPine (NORVASC) 5 MG Tab 5/29/2019 Active   atorvastatin (LIPITOR) 10 MG Tab 5/28/2019 Active   Cinacalcet HCl 90 MG Tab 5/29/2019 Active   Diclofenac Sodium 1 % Gel 5/29/2019  Active   DULoxetine (CYMBALTA) 30 MG Cap DR Particles 5/29/2019 Active   enoxaparin (LOVENOX) 40 MG/0.4ML Solution inj 5/29/2019 Active   ferrous sulfate 325 (65 Fe) MG tablet 5/29/2019 Active   gabapentin (NEURONTIN) 300 MG Cap 5/29/2019 Active   Lactobacillus Rhamnosus, GG, (CULTURELLE PO) 5/29/2019 Active   Lidocaine-Menthol 3.6-1.25 % Patch 5/29/2019 Active   losartan (COZAAR) 50 MG Tab 5/29/2019 Active   MELATONIN PO 5/28/2019 Active   metFORMIN (GLUCOPHAGE) 500 MG Tab 5/29/2019 Active   metoprolol (LOPRESSOR) 25 MG Tab 5/29/2019 Active   mirtazapine (REMERON) 15 MG Tab 5/28/2019 Active   NON SPECIFIED 5/28/2019 Active   pantoprazole (PROTONIX) 40 MG Tablet Delayed Response 5/29/2019 Active   pramipexole (MIRAPEX) 0.5 MG Tab 5/28/2019 Active   rivaroxaban (XARELTO) 15 MG Tab tablet 5/28/2019 Active   tramadol (ULTRAM-ER) 100 MG tablet 5/29/2019 Active              Current Facility-Administered Medications   Medication Dose Route Frequency Provider Last Rate Last Dose   • diazePAM (VALIUM) injection 5 mg  5 mg Intravenous Once Christopher Nguyen M.D.       • Pharmacy Consult Request ...Pain Management Review 1 Each  1 Each Other PHARMACY TO DOSE Mell Menchaca, SISSY.P.N.       • ceFAZolin in dextrose (ANCEF) IVPB premix 2 g  2 g Intravenous Q8HR Mell Menchaca, A.P.N.       • labetalol (NORMODYNE,TRANDATE) injection 10 mg  10 mg Intravenous Q HOUR PRN Mell Menchaca, A.P.N.       • hydrALAZINE (APRESOLINE) injection 10 mg  10 mg Intravenous Q HOUR PRN Mell Menchaca, A.P.N.       • levETIRAcetam (KEPPRA) 500 mg in  mL IVPB  500 mg Intravenous Q12HRS Mell Menchaca, A.P.N.       • docusate sodium (COLACE) capsule 100 mg  100 mg Oral BID Mell Menchaca A.P.N.       • senna-docusate (PERICOLACE or SENOKOT S) 8.6-50 MG per tablet 1 Tab  1 Tab Oral Nightly WOODY MartinezP.N.       • senna-docusate (PERICOLACE or SENOKOT S) 8.6-50 MG per tablet 1 Tab  1 Tab Oral Q24HRS PRN  Mell Menchaca, A.P.N.       • polyethylene glycol/lytes (MIRALAX) PACKET 1 Packet  1 Packet Oral BID PRN Mell Menchaca A.P.N.       • magnesium hydroxide (MILK OF MAGNESIA) suspension 30 mL  30 mL Oral QDAY PRN Mell Menchaca, A.P.N.       • bisacodyl (DULCOLAX) suppository 10 mg  10 mg Rectal Q24HRS PRN Mell Menchaca A.P.N.       • fleet enema 133 mL  1 Each Rectal Once PRN Mell Menchaca A.P.N.       • Pharmacy Consult Request for Amphotericin B monitoring  1 Each Other PRN Karen Garcia M.D.       • NS infusion 500 mL  500 mL Intravenous Q24HR Karen Garcia M.D.        And   • acetaminophen (TYLENOL) tablet 650 mg  650 mg Oral Q24HR Karen Garcia M.D.        And   • diphenhydrAMINE (BENADRYL) tablet/capsule 25 mg  25 mg Oral Q24HR Karen Garcia M.D.        And   • D5W infusion 30 mL  30 mL Intravenous Q24HR Karen Garcia M.D.        And   • amphotericin B liposome (AMBISOME) 285.5 mg in D5W 150 mL IVPB  5 mg/kg Intravenous Q24HR Karen Garcia M.D.        And   • D5W infusion 30 mL  30 mL Intravenous Q24HR Karen Garcia M.D.        And   • NS infusion 250 mL  250 mL Intravenous Q24HR Karen Garcia M.D.       • NS infusion   Intravenous Continuous Enrique K Rita       • oxyCODONE CR (OXYCONTIN) tablet 10 mg  10 mg Oral Q12HRS Chrissie Wells M.D.   Stopped at 06/28/19 0600   • gabapentin (NEURONTIN) capsule 100 mg  100 mg Oral TID Chrissie Wells M.D.   Stopped at 06/28/19 0600   • LORazepam (ATIVAN) tablet 1 mg  1 mg Oral QHS PRN Ignacio Barkley M.D.   1 mg at 06/26/19 0006   • magnesium oxide (MAG-OX) tablet 400 mg  400 mg Oral BID Ignacio Barkley M.D.   Stopped at 06/28/19 0600   • morphine (pf) 4 mg/ml injection 2 mg  2 mg Intravenous Q2HRS PRN Ignacio Barkley M.D.   2 mg at 06/28/19 0936   • lactobacillus rhamnosus (CULTURELLE) capsule 1 Cap  1 Cap Oral QDAY with Breakfast Ignacio Barkley M.D.   Stopped at 06/28/19 0730   • oxyCODONE  immediate-release (ROXICODONE) tablet 5-10 mg  5-10 mg Oral Q4HRS PRN Calvin Woods M.D.   10 mg at 06/27/19 0937   • sucralfate (CARAFATE) 1 GM/10ML suspension 1 g  1 g Oral Q6HRS Calvin Woods M.D.   Stopped at 06/28/19 0600   • acetaminophen (TYLENOL) tablet 1,000 mg  1,000 mg Oral BID Dipak Almonte M.D.   Stopped at 06/28/19 0600   • pramipexole (MIRAPEX) tablet 1 mg  1 mg Oral TID Adryan Murrell A.P.R.N.   Stopped at 06/28/19 0600   • lidocaine (LIDODERM) 5 % 2 Patch  2 Patch Transdermal Q24HR Adryan Murrell A.P.R.N.   2 Patch at 06/27/19 1202   • temazepam (RESTORIL) capsule 15 mg  15 mg Oral QHS PRN Adryan Murrell A.P.R.N.   15 mg at 06/26/19 2222   • cyclobenzaprine (FLEXERIL) tablet 10 mg  10 mg Oral TID PRN Adryan Murrell A.P.R.N.   10 mg at 06/27/19 0938   • Respiratory Care per Protocol   Nebulization Continuous RT Adryan Murrell, A.P.R.N.       • amLODIPine (NORVASC) tablet 10 mg  10 mg Oral Q DAY Odalys Mitchell M.D.   Stopped at 06/28/19 0600   • cinacalcet (SENSIPAR) tablet 60 mg  60 mg Oral DAILY Odalys Mitchell M.D.   Stopped at 06/28/19 0600   • losartan (COZAAR) tablet 50 mg  50 mg Oral Q DAY Odalys Mitchell M.D.   Stopped at 06/28/19 0600   • acetaminophen (TYLENOL) tablet 650 mg  650 mg Oral Q6HRS PRN Odalys Mitchell M.D.   650 mg at 06/20/19 1136   • atorvastatin (LIPITOR) tablet 10 mg  10 mg Oral Q EVENING Odalys Mitchell M.D.   10 mg at 06/27/19 1846   • omeprazole (PRILOSEC) capsule 20 mg  20 mg Oral DAILY Odalys Mitchell M.D.   Stopped at 06/28/19 0600   • metoprolol (LOPRESSOR) tablet 25 mg  25 mg Oral TWICE DAILY Odalys Mitchell M.D.   Stopped at 06/28/19 0600   • ondansetron (ZOFRAN) syringe/vial injection 4 mg  4 mg Intravenous Q4HRS PRN Rfaal Parish M.D.   4 mg at 06/28/19 1320   • ondansetron (ZOFRAN ODT) dispertab 4 mg  4 mg Oral Q4HRS PRN Rafal Parish M.D.   4 mg at 06/03/19 1835       Allergies  No Known Allergies    Vital Signs last 24 hours  Temp:  [36.1 °C  (96.9 °F)-36.9 °C (98.5 °F)] 36.5 °C (97.7 °F)  Pulse:  [64-95] 78  Resp:  [16-30] 17  BP: (104-145)/(49-68) 104/56  SpO2:  [84 %-100 %] 97 %    Physical Exam  Physical Exam   Constitutional: She is oriented to person, place, and time. No distress.   Ill appearing, lethargic   HENT:   Head: Normocephalic and atraumatic.   Right Ear: External ear normal.   Left Ear: External ear normal.   Mouth/Throat: No oropharyngeal exudate.   Eyes: Pupils are equal, round, and reactive to light. Conjunctivae and EOM are normal. Right eye exhibits no discharge. Left eye exhibits no discharge.   Neck: No JVD present. No tracheal deviation present.   Cardiovascular: Normal rate, regular rhythm and normal heart sounds.    No murmur heard.  Pulmonary/Chest: Effort normal and breath sounds normal. No stridor. No respiratory distress. She has no wheezes. She has no rales. She exhibits no tenderness.   Abdominal: She exhibits no mass. There is no tenderness. There is no rebound and no guarding.   Musculoskeletal: She exhibits no edema, tenderness or deformity.   Neurological: She is alert and oriented to person, place, and time. She displays normal reflexes. No cranial nerve deficit. Coordination normal.   Follows commands   Skin: Skin is warm and dry. No rash noted. She is not diaphoretic. No erythema.   Psychiatric: She has a normal mood and affect. Her behavior is normal.   Nursing note and vitals reviewed.      Fluids    Intake/Output Summary (Last 24 hours) at 06/28/19 1708  Last data filed at 06/28/19 1445   Gross per 24 hour   Intake              400 ml   Output               50 ml   Net              350 ml       Laboratory  Recent Results (from the past 48 hour(s))   CBC WITH DIFFERENTIAL    Collection Time: 06/27/19 11:23 AM   Result Value Ref Range    WBC 6.6 4.8 - 10.8 K/uL    RBC 4.75 4.20 - 5.40 M/uL    Hemoglobin 13.4 12.0 - 16.0 g/dL    Hematocrit 42.4 37.0 - 47.0 %    MCV 89.3 81.4 - 97.8 fL    MCH 28.2 27.0 - 33.0 pg     MCHC 31.6 (L) 33.6 - 35.0 g/dL    RDW 56.4 (H) 35.9 - 50.0 fL    Platelet Count 369 164 - 446 K/uL    MPV 9.4 9.0 - 12.9 fL    Neutrophils-Polys 69.50 44.00 - 72.00 %    Lymphocytes 15.70 (L) 22.00 - 41.00 %    Monocytes 10.40 0.00 - 13.40 %    Eosinophils 3.30 0.00 - 6.90 %    Basophils 0.80 0.00 - 1.80 %    Immature Granulocytes 0.30 0.00 - 0.90 %    Nucleated RBC 0.00 /100 WBC    Neutrophils (Absolute) 4.57 2.00 - 7.15 K/uL    Lymphs (Absolute) 1.03 1.00 - 4.80 K/uL    Monos (Absolute) 0.68 0.00 - 0.85 K/uL    Eos (Absolute) 0.22 0.00 - 0.51 K/uL    Baso (Absolute) 0.05 0.00 - 0.12 K/uL    Immature Granulocytes (abs) 0.02 0.00 - 0.11 K/uL    NRBC (Absolute) 0.00 K/uL   Basic Metabolic Panel    Collection Time: 19 11:23 AM   Result Value Ref Range    Sodium 132 (L) 135 - 145 mmol/L    Potassium 4.9 3.6 - 5.5 mmol/L    Chloride 99 96 - 112 mmol/L    Co2 25 20 - 33 mmol/L    Glucose 85 65 - 99 mg/dL    Bun 17 8 - 22 mg/dL    Creatinine 0.65 0.50 - 1.40 mg/dL    Calcium 12.3 (HH) 8.5 - 10.5 mg/dL    Anion Gap 8.0 0.0 - 11.9   APTT    Collection Time: 19 11:23 AM   Result Value Ref Range    APTT 32.5 24.7 - 36.0 sec   Prothrombin Time    Collection Time: 19 11:23 AM   Result Value Ref Range    PT 13.6 12.0 - 14.6 sec    INR 1.02 0.87 - 1.13   COD - Adult (Type and Screen)    Collection Time: 19 11:23 AM   Result Value Ref Range    ABO Grouping Only O     Rh Grouping Only POS     Antibody Screen-Cod NEG    ESTIMATED GFR    Collection Time: 19 11:23 AM   Result Value Ref Range    GFR If African American >60 >60 mL/min/1.73 m 2    GFR If Non African American >60 >60 mL/min/1.73 m 2   EKG    Collection Time: 19 11:31 AM   Result Value Ref Range    Report       [facility]    Test Date:  2019  Pt Name:    MARLENA CLIFFORD                Department: 3  MRN:        [id number]                      Room:       [facility]  Gender:     Female                       Technician: DELLA  :         [id number]                   Requested By:MOHINDER BORGES  Order #:    [id number]                    Reading MD: Nanci Chu MD    Measurements  Intervals                                Axis  Rate:       74                           P:          29  MI:         137                          QRS:        -29  QRSD:       84                           T:          34  QT:         370  QTc:        411    Interpretive Statements  SINUS RHYTHM  ANTERIOR INFARCT, OLD  Compared to ECG 04/16/2019 05:35:39  Sinus tachycardia no longer present    Electronically Signed On 6- 16:30:28 PDT by Nanci Chu MD     Histology Request    Collection Time: 06/28/19  4:03 PM   Result Value Ref Range    Pathology Request Sent to Histo        Imaging  MR-LUMBAR SPINE-WITH & W/O   Final Result         1.  Grade 2-3 spondylolisthesis at the L5-S1 level with bilateral spondylolysis of the pars interarticularis. This causes severe bilateral neural foraminal stenosis at this level.      2.  Interval removal of posterior fusion hardware at the lumbosacral junction.      3.  Diffuse osteomyelitis involving the L5 vertebral body and the first 3 sacral segments.      4.  Chronic discitis at the L5-S1 level with anterior paraspinous abscess at this level and anterior to the S1 sacral segment.      5.  Smaller intraosseous bone bone abscesses or areas of necrosis noted in the sacrum.      6.  There is also evidence of osteomyelitis involving the jose antonio and sacrum bilaterally along the course of the previous sacral fixation screw. There is a large 4 cm abscess noted in the right gluteal soft tissues in a smaller 3 cm chronic appearing    abscess in the left gluteal musculature.      7.  There is a 1.5 cm intraosseous abscess or area of necrosis in the right posterior ilium.      8.  There is a 3.3 cm centimeters multiloculated abscess anterior to the right sacroiliac joint.      MR-STEALTH BRAIN WITH & W/O   Final Result         1.   Innumerable subcentimeter brain metastases, many at the gray-white junction but also multiple noted throughout the basal ganglia and brainstem.      2.  Largest index lesion is noted in the right thalamus and has increased in size since previous exam and currently measures 9 mm and has a moderate amount of surrounding vasogenic edema.      US-EXTREMITY NON VASCULAR UNILATERAL RIGHT   Final Result      No right hip joint effusion. No soft tissue fluid collection.      MR-BRAIN-WITH & W/O   Final Result      1.  Innumerable supra and infratentorial enhancing nodules are again noted throughout the brain parenchyma with multiple lesions appearing somewhat larger and with increased enhancement. No associated diffusion restriction. Unchanged differential    considerations including bacterial or fungal infection versus metastatic disease.   2.  Mild diffuse cerebral substance loss.   3.  Mild microangiopathic ischemic change versus demyelination or gliosis.      CT-NEEDLE BX-DEEP BONE   Final Result      CT GUIDED RIGHT ILIUM DEEP BONE BIOPSY. SAMPLES WERE SENT TO MICROBIOLOGY FOR ANALYSIS.      CT-PELVIS WITH   Final Result         1.  No significant interval change in the size of the RIGHT iliac muscle fluid collections   2.  Hyperdense RIGHT gluteal lesion moderately suspicious for pseudoaneurysm with adjacent hematoma.   3.  Changes in the RIGHT iliac bone, BILATERAL sacrum and LEFT iliac bone suspicious for osteomyelitis   4.  Changes at L5-S1 suspicious for discitis osteomyelitis      CT-PELVIS WITH   Final Result      1.  Residual or recurrent abscess within the right iliacus muscle measuring 2.5 x 1.8 cm. It slightly smaller than on 5/28/2019      2.  Interval removal of hardware from the iliac bones and sacrum      3.  Lytic change of the right iliac bone and the sacrum. This could be related to hardware versus osteomyelitis.      4.  Subacute fractures of the right ilium, sacrum, and there is lucency within the  left iliac bone that is likely from hardware      MR-BRAIN-WITH & W/O   Final Result      1.  Interval progression of supra and infratentorial intra-axial nodules and associated edema. This short-term interval progression favors infection over neoplasm though both remain considerations.   2.  Mild atrophy      DX-PORTABLE FLUOROSCOPY < 1 HOUR   Final Result         Portable fluoroscopy utilized for 2 minutes.      DX-PELVIS-1 OR 2 VIEWS   Final Result      Status post hardware removal from the right SI joint      DX-CHEST-PORTABLE (1 VIEW)   Final Result      Interstitial prominence could be due to pulmonary edema or hypoventilatory change.      DX-CHEST-LIMITED (1 VIEW)   Final Result      LEFT basilar underinflation atelectasis or pneumonia      CT-DRAIN-HEMATOMA - SEROMA   Final Result      CT-guided RIGHT pelvic fluid collection aspiration, laboratory evaluation pending             Assessment/Plan  * Abscess of right iliac muscle and right gluteus medius muscle- (present on admission)   Assessment & Plan    Likely fungal infection  Multiple courses antibiotics  Amphotericin b  Pending differentiation of type     Brain lesion- (present on admission)   Assessment & Plan    S/p biopsy  Fungal  Amphotericin b  q 1 hour neuro checks     Sepsis (HCC)   Assessment & Plan    Fungal infection, 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      The patient remains critically ill. She is in ICU post biopsy of brain lesions from fungus infection. She requires very close neurological observation with q 1 hour neuro checks.  I have assessed her blood pressure, hemodynamics,  cardiovascular status and neurologic status.  She is at high risk for worsening CNS system dysfunction.  There is high risk of deterioration and worsening vital organ dysfunction and death without critical care monitoring/management.  Critical care time = 30 minutes in directly providing and coordinating critical care and extensive data review.  No time overlap and excludes procedures.

## 2019-06-29 NOTE — CARE PLAN
Problem: Communication  Goal: The ability to communicate needs accurately and effectively will improve  Outcome: PROGRESSING AS EXPECTED    Intervention: [place] patient and significant other/support system to call light to alert staff of needs  Call light within reach, bed alarm set      Problem: Bowel/Gastric:  Goal: Normal bowel function is maintained or improved  Outcome: PROGRESSING SLOWER THAN EXPECTED    Intervention: Educate patient and significant other/support system about diet, fluid intake, medications and activity to promote bowel function  miralax given

## 2019-06-30 LAB
ANION GAP SERPL CALC-SCNC: 8 MMOL/L (ref 0–11.9)
BUN SERPL-MCNC: 15 MG/DL (ref 8–22)
CALCIUM SERPL-MCNC: 9.2 MG/DL (ref 8.5–10.5)
CHLORIDE SERPL-SCNC: 101 MMOL/L (ref 96–112)
CO2 SERPL-SCNC: 27 MMOL/L (ref 20–33)
CREAT SERPL-MCNC: 0.44 MG/DL (ref 0.5–1.4)
ERYTHROCYTE [DISTWIDTH] IN BLOOD BY AUTOMATED COUNT: 54.4 FL (ref 35.9–50)
GLUCOSE SERPL-MCNC: 81 MG/DL (ref 65–99)
HCT VFR BLD AUTO: 34.4 % (ref 37–47)
HGB BLD-MCNC: 10.8 G/DL (ref 12–16)
MAGNESIUM SERPL-MCNC: 1.8 MG/DL (ref 1.5–2.5)
MCH RBC QN AUTO: 27.9 PG (ref 27–33)
MCHC RBC AUTO-ENTMCNC: 31.4 G/DL (ref 33.6–35)
MCV RBC AUTO: 88.9 FL (ref 81.4–97.8)
PLATELET # BLD AUTO: 300 K/UL (ref 164–446)
PMV BLD AUTO: 9.8 FL (ref 9–12.9)
POTASSIUM SERPL-SCNC: 3.2 MMOL/L (ref 3.6–5.5)
RBC # BLD AUTO: 3.87 M/UL (ref 4.2–5.4)
SODIUM SERPL-SCNC: 136 MMOL/L (ref 135–145)
WBC # BLD AUTO: 7.1 K/UL (ref 4.8–10.8)

## 2019-06-30 PROCEDURE — 700102 HCHG RX REV CODE 250 W/ 637 OVERRIDE(OP): Performed by: FAMILY MEDICINE

## 2019-06-30 PROCEDURE — 700102 HCHG RX REV CODE 250 W/ 637 OVERRIDE(OP): Performed by: NURSE PRACTITIONER

## 2019-06-30 PROCEDURE — 700111 HCHG RX REV CODE 636 W/ 250 OVERRIDE (IP): Performed by: FAMILY MEDICINE

## 2019-06-30 PROCEDURE — A9270 NON-COVERED ITEM OR SERVICE: HCPCS | Performed by: INTERNAL MEDICINE

## 2019-06-30 PROCEDURE — A9270 NON-COVERED ITEM OR SERVICE: HCPCS | Performed by: FAMILY MEDICINE

## 2019-06-30 PROCEDURE — 700105 HCHG RX REV CODE 258: Performed by: NURSE PRACTITIONER

## 2019-06-30 PROCEDURE — 700105 HCHG RX REV CODE 258: Performed by: FAMILY MEDICINE

## 2019-06-30 PROCEDURE — 99232 SBSQ HOSP IP/OBS MODERATE 35: CPT | Performed by: FAMILY MEDICINE

## 2019-06-30 PROCEDURE — 700112 HCHG RX REV CODE 229: Performed by: NURSE PRACTITIONER

## 2019-06-30 PROCEDURE — 770006 HCHG ROOM/CARE - MED/SURG/GYN SEMI*

## 2019-06-30 PROCEDURE — 85027 COMPLETE CBC AUTOMATED: CPT

## 2019-06-30 PROCEDURE — 700102 HCHG RX REV CODE 250 W/ 637 OVERRIDE(OP): Performed by: INTERNAL MEDICINE

## 2019-06-30 PROCEDURE — 700111 HCHG RX REV CODE 636 W/ 250 OVERRIDE (IP): Mod: JG | Performed by: FAMILY MEDICINE

## 2019-06-30 PROCEDURE — 36415 COLL VENOUS BLD VENIPUNCTURE: CPT

## 2019-06-30 PROCEDURE — 700101 HCHG RX REV CODE 250: Performed by: FAMILY MEDICINE

## 2019-06-30 PROCEDURE — 700111 HCHG RX REV CODE 636 W/ 250 OVERRIDE (IP): Performed by: NURSE PRACTITIONER

## 2019-06-30 PROCEDURE — 700102 HCHG RX REV CODE 250 W/ 637 OVERRIDE(OP): Performed by: HOSPITALIST

## 2019-06-30 PROCEDURE — 99233 SBSQ HOSP IP/OBS HIGH 50: CPT | Performed by: INTERNAL MEDICINE

## 2019-06-30 PROCEDURE — 83735 ASSAY OF MAGNESIUM: CPT

## 2019-06-30 PROCEDURE — 51798 US URINE CAPACITY MEASURE: CPT

## 2019-06-30 PROCEDURE — A9270 NON-COVERED ITEM OR SERVICE: HCPCS | Performed by: NURSE PRACTITIONER

## 2019-06-30 PROCEDURE — A9270 NON-COVERED ITEM OR SERVICE: HCPCS | Performed by: HOSPITALIST

## 2019-06-30 PROCEDURE — 80048 BASIC METABOLIC PNL TOTAL CA: CPT

## 2019-06-30 PROCEDURE — 700111 HCHG RX REV CODE 636 W/ 250 OVERRIDE (IP): Performed by: HOSPITALIST

## 2019-06-30 RX ORDER — POTASSIUM CHLORIDE 20 MEQ/1
20 TABLET, EXTENDED RELEASE ORAL ONCE
Status: COMPLETED | OUTPATIENT
Start: 2019-06-30 | End: 2019-06-30

## 2019-06-30 RX ORDER — ACETAMINOPHEN 500 MG
500 TABLET ORAL EVERY 6 HOURS PRN
Status: DISCONTINUED | OUTPATIENT
Start: 2019-06-30 | End: 2019-07-22

## 2019-06-30 RX ORDER — MAGNESIUM SULFATE HEPTAHYDRATE 40 MG/ML
2 INJECTION, SOLUTION INTRAVENOUS ONCE
Status: COMPLETED | OUTPATIENT
Start: 2019-06-30 | End: 2019-06-30

## 2019-06-30 RX ADMIN — CINACALCET HYDROCHLORIDE 60 MG: 30 TABLET, COATED ORAL at 05:39

## 2019-06-30 RX ADMIN — LOSARTAN POTASSIUM 50 MG: 50 TABLET ORAL at 05:40

## 2019-06-30 RX ADMIN — SUCRALFATE 1 G: 1 SUSPENSION ORAL at 12:33

## 2019-06-30 RX ADMIN — AMLODIPINE BESYLATE 10 MG: 5 TABLET ORAL at 05:40

## 2019-06-30 RX ADMIN — DOCUSATE SODIUM 100 MG: 100 CAPSULE, LIQUID FILLED ORAL at 05:39

## 2019-06-30 RX ADMIN — OXYCODONE HYDROCHLORIDE 5 MG: 5 TABLET ORAL at 12:33

## 2019-06-30 RX ADMIN — OXYCODONE HYDROCHLORIDE 10 MG: 10 TABLET, FILM COATED, EXTENDED RELEASE ORAL at 05:40

## 2019-06-30 RX ADMIN — ACETAMINOPHEN 1000 MG: 500 TABLET ORAL at 05:40

## 2019-06-30 RX ADMIN — LIDOCAINE 2 PATCH: 50 PATCH CUTANEOUS at 12:33

## 2019-06-30 RX ADMIN — GABAPENTIN 100 MG: 100 CAPSULE ORAL at 12:33

## 2019-06-30 RX ADMIN — METOPROLOL TARTRATE 25 MG: 25 TABLET, FILM COATED ORAL at 05:40

## 2019-06-30 RX ADMIN — SODIUM CHLORIDE 500 MG: 9 INJECTION, SOLUTION INTRAVENOUS at 06:00

## 2019-06-30 RX ADMIN — DEXTROSE MONOHYDRATE 30 ML: 50 INJECTION, SOLUTION INTRAVENOUS at 19:15

## 2019-06-30 RX ADMIN — SUCRALFATE 1 G: 1 SUSPENSION ORAL at 05:39

## 2019-06-30 RX ADMIN — GABAPENTIN 100 MG: 100 CAPSULE ORAL at 05:40

## 2019-06-30 RX ADMIN — POTASSIUM CHLORIDE 20 MEQ: 1500 TABLET, EXTENDED RELEASE ORAL at 12:33

## 2019-06-30 RX ADMIN — ONDANSETRON 4 MG: 4 TABLET, ORALLY DISINTEGRATING ORAL at 05:56

## 2019-06-30 RX ADMIN — MAGNESIUM SULFATE 2 G: 2 INJECTION INTRAVENOUS at 12:32

## 2019-06-30 RX ADMIN — ONDANSETRON 4 MG: 2 INJECTION INTRAMUSCULAR; INTRAVENOUS at 12:32

## 2019-06-30 RX ADMIN — DEXTROSE MONOHYDRATE 30 ML: 50 INJECTION, SOLUTION INTRAVENOUS at 17:15

## 2019-06-30 RX ADMIN — OMEPRAZOLE 20 MG: 20 CAPSULE, DELAYED RELEASE ORAL at 05:40

## 2019-06-30 RX ADMIN — MAGNESIUM OXIDE TAB 400 MG (241.3 MG ELEMENTAL MG) 400 MG: 400 (241.3 MG) TAB at 05:39

## 2019-06-30 RX ADMIN — PRAMIPEXOLE DIHYDROCHLORIDE 1 MG: 0.5 TABLET ORAL at 12:33

## 2019-06-30 RX ADMIN — SODIUM CHLORIDE 250 ML: 9 INJECTION, SOLUTION INTRAVENOUS at 20:42

## 2019-06-30 RX ADMIN — ACETAMINOPHEN 650 MG: 325 TABLET, FILM COATED ORAL at 16:45

## 2019-06-30 RX ADMIN — SODIUM CHLORIDE 500 ML: 9 INJECTION, SOLUTION INTRAVENOUS at 16:39

## 2019-06-30 RX ADMIN — OXYCODONE HYDROCHLORIDE 5 MG: 5 TABLET ORAL at 22:15

## 2019-06-30 RX ADMIN — DIPHENHYDRAMINE HCL 25 MG: 25 TABLET ORAL at 16:45

## 2019-06-30 RX ADMIN — SODIUM CHLORIDE 500 MG: 9 INJECTION, SOLUTION INTRAVENOUS at 16:45

## 2019-06-30 RX ADMIN — OXYCODONE HYDROCHLORIDE 5 MG: 5 TABLET ORAL at 15:49

## 2019-06-30 RX ADMIN — AMPHOTERICIN B 285.5 MG: 50 INJECTION, POWDER, LYOPHILIZED, FOR SOLUTION INTRAVENOUS at 18:00

## 2019-06-30 RX ADMIN — PRAMIPEXOLE DIHYDROCHLORIDE 1 MG: 0.5 TABLET ORAL at 05:40

## 2019-06-30 ASSESSMENT — ENCOUNTER SYMPTOMS
CHILLS: 0
TREMORS: 0
ORTHOPNEA: 0
DIAPHORESIS: 0
VOMITING: 0
FEVER: 0
SPUTUM PRODUCTION: 0
PHOTOPHOBIA: 0
MYALGIAS: 0
DOUBLE VISION: 0
COUGH: 0
CLAUDICATION: 0
TINGLING: 0
HEMOPTYSIS: 0
PALPITATIONS: 0
NAUSEA: 0
HEARTBURN: 0
EYE PAIN: 0

## 2019-06-30 NOTE — PROGRESS NOTES
Pharmacy Pharmacotherapy Consult   LOS >30 days  Admit Date: 5/29/2019    Medications were reviewed for appropriateness and ongoing need.   Current Facility-Administered Medications   Medication Dose Route Frequency Provider Last Rate Last Dose   • acetaminophen (TYLENOL) tablet 500 mg  500 mg Oral Q6HRS PRN Cassandra Hough M.D.       • magnesium sulfate IVPB premix 2 g  2 g Intravenous Once Cassandra Hough M.D.       • potassium chloride SA (Kdur) tablet 20 mEq  20 mEq Oral Once Cassandra Hough M.D.       • Pharmacy Consult Request ...Pain Management Review 1 Each  1 Each Other PHARMACY TO DOSE Cassandra Hough M.D.       • labetalol (NORMODYNE,TRANDATE) injection 10 mg  10 mg Intravenous Q HOUR PRN Cassandra Hough M.D.       • hydrALAZINE (APRESOLINE) injection 10 mg  10 mg Intravenous Q HOUR PRN Cassandra Hough M.D.   10 mg at 06/29/19 1010   • levETIRAcetam (KEPPRA) 500 mg in  mL IVPB  500 mg Intravenous Q12HRS Cassandra Hough M.D.   Stopped at 06/30/19 0615   • docusate sodium (COLACE) capsule 100 mg  100 mg Oral BID Mell Menchaca, A.P.N.   100 mg at 06/30/19 0539   • senna-docusate (PERICOLACE or SENOKOT S) 8.6-50 MG per tablet 1 Tab  1 Tab Oral Nightly Mell Menchaca, A.P.N.   1 Tab at 06/29/19 2100   • Pharmacy Consult Request for Amphotericin B monitoring  1 Each Other PRN Cassandra Hough M.D.       • NS infusion 500 mL  500 mL Intravenous Q24HR Cassandra Hough M.D. 500 mL/hr at 06/29/19 1614 500 mL at 06/29/19 1614    And   • acetaminophen (TYLENOL) tablet 650 mg  650 mg Oral Q24HR Cassandra Hough M.D.   650 mg at 06/29/19 1711    And   • diphenhydrAMINE (BENADRYL) tablet/capsule 25 mg  25 mg Oral Q24HR Cassandra Hough M.D.   25 mg at 06/29/19 1711    And   • D5W infusion 30 mL  30 mL Intravenous Q24HR Cassandra Hough M.D.   Stopped at 06/29/19 1820    And   • amphotericin B liposome (AMBISOME) 285.5 mg in D5W 150 mL IVPB   5 mg/kg Intravenous Q24HR Cassandra Hough M.D.   Stopped at 06/29/19 2050    And   • D5W infusion 30 mL  30 mL Intravenous Q24HR Cassandra Hough M.D.   Stopped at 06/29/19 2150    And   • NS infusion 250 mL  250 mL Intravenous Q24HR Cassandra Hough M.D.   Stopped at 06/29/19 2238   • NS infusion   Intravenous Continuous Cassandra Hough M.D. 50 mL/hr at 06/29/19 0820     • oxyCODONE CR (OXYCONTIN) tablet 10 mg  10 mg Oral Q12HRS Cassandra Hough M.D.   10 mg at 06/30/19 0540   • gabapentin (NEURONTIN) capsule 100 mg  100 mg Oral TID Cassandra Hough M.D.   100 mg at 06/30/19 0540   • magnesium oxide (MAG-OX) tablet 400 mg  400 mg Oral BID Cassandra Hough M.D.   400 mg at 06/30/19 0539   • morphine (pf) 4 mg/ml injection 2 mg  2 mg Intravenous Q2HRS PRN Cassandra Hough M.D.   2 mg at 06/29/19 0114   • lactobacillus rhamnosus (CULTURELLE) capsule 1 Cap  1 Cap Oral QDAY with Breakfast Cassandra Hough M.D.   1 Cap at 06/29/19 0754   • oxyCODONE immediate-release (ROXICODONE) tablet 5-10 mg  5-10 mg Oral Q4HRS PRN Cassandra Hough M.D.   5 mg at 06/29/19 2324   • sucralfate (CARAFATE) 1 GM/10ML suspension 1 g  1 g Oral Q6HRS Cassandra Hough M.D.   1 g at 06/30/19 0539   • acetaminophen (TYLENOL) tablet 1,000 mg  1,000 mg Oral BID Cassandra Hough M.D.   1,000 mg at 06/30/19 0540   • pramipexole (MIRAPEX) tablet 1 mg  1 mg Oral TID Cassandra Hough M.D.   1 mg at 06/30/19 0540   • lidocaine (LIDODERM) 5 % 2 Patch  2 Patch Transdermal Q24HR Cassandra Hough M.D.   2 Patch at 06/29/19 1019   • temazepam (RESTORIL) capsule 15 mg  15 mg Oral QHS PRN Cassandra Hough M.D.   15 mg at 06/26/19 2222   • cyclobenzaprine (FLEXERIL) tablet 10 mg  10 mg Oral TID PRN Cassandra Hough M.D.   10 mg at 06/27/19 0938   • Respiratory Care per Protocol   Nebulization Continuous RT Adryan Murrell, A.P.R.N.       • amLODIPine (NORVASC) tablet 10 mg  10 mg  Oral Q DAY Cassandra Hough M.D.   10 mg at 06/30/19 0540   • cinacalcet (SENSIPAR) tablet 60 mg  60 mg Oral DAILY Cassandra Hough M.D.   60 mg at 06/30/19 0539   • losartan (COZAAR) tablet 50 mg  50 mg Oral Q DAY CLEMENT HollyDAshley   50 mg at 06/30/19 0540   • atorvastatin (LIPITOR) tablet 10 mg  10 mg Oral Q EVENING CLEMENT HollyDAshley   10 mg at 06/29/19 1713   • omeprazole (PRILOSEC) capsule 20 mg  20 mg Oral DAILY Cassandra Hough M.D.   20 mg at 06/30/19 0540   • metoprolol (LOPRESSOR) tablet 25 mg  25 mg Oral TWICE DAILY Cassandra Hough M.D.   25 mg at 06/30/19 0540   • ondansetron (ZOFRAN) syringe/vial injection 4 mg  4 mg Intravenous Q4HRS PRN Cassandra Hough M.D.   4 mg at 06/28/19 1320   • ondansetron (ZOFRAN ODT) dispertab 4 mg  4 mg Oral Q4HRS PRN Cassandra Hough M.D.   4 mg at 06/30/19 0556     Recommendations:  1. Recommend discontinuation of as needed portions of bowel protocol due to lack of use.    Kulwant Adhikari, PharmD

## 2019-06-30 NOTE — PROGRESS NOTES
[facility] Medicine Daily Progress Note    Date of Service  6/30/2019    Chief Complaint  68 y.o. female admitted 5/29/2019 with  Ms. Martini is a 68-year-old female who was transferred from [facility] on 5/29/2019 with persistent right gluteal abscesses since sacral fracture repair in December.  She has had multiple IR drainage.  She initially presented with pelvic pain, low back pain, and confusion. She also had new slurred speech. Imaging of the abdomen, pelvis and brain revealed abscesses and she was treated with a full course of IV antibiotics per ID.  Her slurred speech improved and she was able to ambulate with physical therapy. However, repeat imaging showed persistent pelvic abscesses.  MRI brain shows increase in brain lesions.  Her brain lesions are known from prior imaging and there is concern they represent metastasis.  Oncology aware and do not recommend further imaging. Cultures remained negative and a CHAS done by Dr. Potter showed no vegetations. The size of the pelvic abscesses was reviewed by interventional radiology and the case was discussed with Dr. Finley who recommended transfer to [facility] for CT guided drainage. She underwent drainage on 5/30. On 6/4, she became febrile again, so restarted on cefepime and vancomycin per ID and has been on them since.  Right hip hardware was removed on 6/5. Repeat MRI brain on 6/7/19 saw progression of the supra and infratentorial intra-axial nodules with edema, prompting a Neurosurgery consult.  Dr. Nguyen stated lesions are not amendable to biopsy stereotactically, and favors infectious etiology, recommended continued antibiotics, antineuroleptic and repeat brain MRI in 2 weeks (around 6/21).  Repeat CT pelvis on 6/9 showed residual fluid collection 2.5 x 1.8 cm in right iliacus muscle., slightly smaller than before.  Cefepime + vanc were continue. Work up was broadened to include fungal etiology, and she is started on empiric started fluconazole. Repeat CT on 6/17  showed no significant interval change in the size of the right iliacus muscle fluid collections, with changes in the right iliac bone, bilateral sacrum, and left iliac bone suspicious for osteomyelitis, and changes at L5-S1 suspicious for discitis/osteomyelitis, along with hyperdense right gluteal lesion, moderately suspicious for postoperative pseudoaneurysm with adjacent hematoma. IR felt aspiration of the fluid will have low yield, and so recommended biopsy of the nonhealing right iliac fracture area instead. Biopsy of the hip showed no AFB, organism, or fungal elements. MRI of the brain showed innumerable supra and infratentorial enhancing nodules again noted, with multiple lesions appearing somewhat larger and with increased enhancement.    Hospital Course    as per above    Interval Problem Update  none    Consultants/Specialty  Infectious disease  Orthopedic surgery  Neurosurgery, Dr. Nguyen  IR    Code Status  DNR    Disposition  PENDING    Review of Systems  Review of Systems   Constitutional: Negative for chills, diaphoresis and fever.   HENT: Negative for ear discharge, ear pain and tinnitus.    Eyes: Negative for double vision, photophobia and pain.   Respiratory: Negative for cough, hemoptysis and sputum production.    Cardiovascular: Negative for palpitations, orthopnea and claudication.   Gastrointestinal: Negative for heartburn, nausea and vomiting.   Genitourinary: Negative for frequency, hematuria and urgency.   Musculoskeletal: Negative for myalgias.   Skin: Negative for itching and rash.   Neurological: Negative for tingling and tremors.        Physical Exam  Temp:  [36.4 °C (97.5 °F)-37.3 °C (99.2 °F)] 36.6 °C (97.9 °F)  Pulse:  [] 86  Resp:  [15-54] 18  BP: (114-173)/(56-82) 122/65  SpO2:  [91 %-100 %] 94 %    Physical Exam   Constitutional: No distress.   HENT:   Head: Normocephalic and atraumatic.   Eyes: Pupils are equal, round, and reactive to light. Conjunctivae are normal.   Neck:  Normal range of motion. Neck supple.   Cardiovascular: Normal rate and regular rhythm.    Pulmonary/Chest: Effort normal and breath sounds normal.   Abdominal: Soft. Bowel sounds are normal.   Musculoskeletal: She exhibits no edema or tenderness.   Neurological: She is alert.   Skin: Skin is warm and dry. She is not diaphoretic.       Fluids    Intake/Output Summary (Last 24 hours) at 06/30/19 0952  Last data filed at 06/29/19 2238   Gross per 24 hour   Intake          2496.42 ml   Output             1210 ml   Net          1286.42 ml       Laboratory  Recent Labs      06/28/19   1920 06/29/19   0410  06/30/19   0254   WBC  1.5*  7.7  7.1   RBC  4.32  4.00*  3.87*   HEMOGLOBIN  12.7  11.6*  10.8*   HEMATOCRIT  37.8  34.6*  34.4*   MCV  87.5  86.5  88.9   MCH  29.4  29.0  27.9   MCHC  33.6  33.5*  31.4*   RDW  54.1*  52.9*  54.4*   PLATELETCT  221  293  300   MPV  9.1  9.7  9.8     Recent Labs      06/28/19 1920 06/29/19   0410  06/30/19   0254   SODIUM  132*  137  136   POTASSIUM  4.5  4.0  3.2*   CHLORIDE  101  101  101   CO2  21  25  27   GLUCOSE  123*  117*  81   BUN  25*  22  15   CREATININE  0.90  0.71  0.44*   CALCIUM  12.4*  10.3  9.2     Recent Labs      06/27/19   1123   APTT  32.5   INR  1.02               Imaging      Assessment/Plan  * Abscess of right iliac muscle and right gluteus medius muscle- (present on admission)   Assessment & Plan    - Recurrent issue since sacral fracture repair in December 2018. Has had multiple IR drainage and antibiotics. Recent cultures remain unrevealing.    - s/p biopsy of the nonhealing right iliac fracture area. Showed no AFB, organism, or fungal elements.  Cultures negative.  on amphotericin B  Ortho and ID inputs are noted     Brain lesion- (present on admission)   Assessment & Plan    Status post brain biopsy  Intra-Op pathology consistent with fungal infection patient   On Amphotericin  And d/w pharmacist  ID input is noted  Neurosurgery input is noted  Continue  Keppra     Sepsis (HCC)   Assessment & Plan    -This is sepsis (without associated acute organ dysfunction).      Sepsis resolved  Continue amphotericin and resume and follow-up on final intraoperative cultures     Hypomagnesemia- (present on admission)   Assessment & Plan    Replete with IV magnesium sulfate  Closely monitor electrolytes and replete with amphotericin therapy     Hypercalcemia- (present on admission)   Assessment & Plan    Has resolved     Acquired circulating anticoagulants (HCC)- (present on admission)   Assessment & Plan    Anticoagulation on hold given recent brain biopsy     Non-severe protein-calorie malnutrition (HCC)- (present on admission)   Assessment & Plan    Advance diet as tolerated  Dietary supplements       History of DVT (deep vein thrombosis)- (present on admission)   Assessment & Plan    Hold anticoagulation given recent brain biopsy and lumbar osteomyelitis  Restart anticoagulation once permitted by neurosurgery     Essential hypertension- (present on admission)   Assessment & Plan    Continue metoprolol losartan and amlodipine  controlled     Chronic hyponatremia- (present on admission)   Assessment & Plan    Improved continue to monitor      RLS (restless legs syndrome)- (present on admission)   Assessment & Plan    On Mirapex     Chronic pain- (present on admission)   Assessment & Plan    Continue pain management       History of breast cancer- (present on admission)   Assessment & Plan    -S/p left mastectomy and breast implant.  Follow-up on intraoperative final pathology results     COPD (chronic obstructive pulmonary disease) (HCC)- (present on admission)   Assessment & Plan    RT protocol and oxygen          VTE prophylaxis: scd

## 2019-06-30 NOTE — PROGRESS NOTES
Pt appears to sleep in bed in NAD-dressing clean, dry and intact to Rt head surgical area, pt turned and repositioned for comfort, pt refusing meal tray, but did drink some water and juice when offered. Call light in reach, but pt has not been calling for assist despite education-will continue hourly checks.

## 2019-06-30 NOTE — CARE PLAN
Problem: Communication  Goal: The ability to communicate needs accurately and effectively will improve  Outcome: PROGRESSING AS EXPECTED    Intervention: [place] patient and significant other/support system to call light to alert staff of needs  Reorient patient with each encounter      Problem: Safety  Goal: Will remain free from falls  Outcome: PROGRESSING AS EXPECTED    Intervention: Implement fall precautions  2 person assist, bed alarm set

## 2019-06-30 NOTE — PROGRESS NOTES
Pt assisted to bedside commode for BM and small void-pt very weak and requires 2 person assist for transfer, bladder scan done and found to be retaining urine-straight cath done with 500 ml return-pt tolerated procedure well.

## 2019-06-30 NOTE — PROGRESS NOTES
Skin check for newly admitted/transfered patient:    Pt's bilateral elbows are intact.  Pt's sacral area is red but blanchable, prophylactic mepilex in place.  Pt's bilateral heels are red but blanchable.  Pt has healing surgical wound to right hip, open to air.  Pt has right scalp surgical wound with gauze and tape dressing in place, dressing is clean, dry, and intact.    Pt is being turned every 2 hours to help mitigate potential skin breakdown due to friction and shear.

## 2019-06-30 NOTE — PROGRESS NOTES
Pt vomited up her medication this morning promptly after taking them. Vomit had water consistency with evidence of pills floating. Zofran given to pt for nausea.

## 2019-06-30 NOTE — PROGRESS NOTES
Infectious Disease Progress Note    Author: Karen Garcia M.D. Date & Time of service: 6/30/2019  12:48 PM    Chief Complaint:  Brain abscess, gluteal abscess  Vertebral OM    Interval History:  68 y.o. female admitted 5/29/2019 as a transfer from [facility] since 4/30/2019. + diabetes, SANTOSH of right hip with hardware, and breast cancer who was originally admitted to [facility] on 03/08/2019 for right hip and pelvic pain.  Work-up revealed a right iliopsoas lesion, gluteal abscess, and mult brain abscesses  6/1/2019-no fevers.  Continues to complain of significant pain in her hips.  Pain medications are being adjusted.  6/2/2019-no fevers.  Continues to remain off the antibiotics.  Awaiting Ortho evaluation  6/4/2019 patient febrile up to 103.  Having shaking chills.  Is not feeling well.  Denies any specific complaints.  6/10 AF WBC 5.3 somnolent No fever or new complaints  6/11 AF WBC 9 No fever or chills-some pain at surgical site. Limited participation with PT noted  6/12 AF WBC 7.3 somnolent but arousable-no new complaints  6/13 afebrile WBC 9.1 patient complaining of left hip pain.  Denies any headaches  6/14 afebrile, no CBC today.  Reports no new issues, tolerating antibiotics.  States the left hip pain is unchanged.  6/18 afebrile, WBC 6.9, HR 80s, -150s, on room air. Repeat CT on 6/17 showed no change in right iliac fluid collection, changes in right iliac/bilateral sacrum and left iliac suspicious of OM.   6/20- no fevers. C/o hip pain.   6/21/2019 no fevers.  Complains of some swelling at the right hip  6/22/2019-continues to complain of swelling of the right hip.  No fevers.  6/23/2019-no fevers.  The swelling is improved.  The ultrasound did not show any abscess or fluid collection.  6/24/2019 no fevers.  The hip pain is better.  No cough no shortness of breath  6/25/2019-no fevers.  The hip cultures remain negative.  She is scheduled for stereotactic biopsy on Friday.  6/26/2019 no fevers are  noted.  Patient complains of back pain.  In significant distress.  2019-no fevers.  Continues to complain of pain.  No new issues overnight   AF WBC 6.6 planned for MRIs and biopsy today-somnolent   AF WBC 7.7 obtunded prelim path showed fungus on brain specimen   AF somnolent-not awakening to voice-slurred speech noted by Neurosurgery  Labs Reviewed, Medications Reviewed, and Wound Reviewed.    Review of Systems:  Review of Systems   Unable to perform ROS: Mental status change   Constitutional: Negative for fever.       Hemodynamics:  Temp (24hrs), Av.8 °C (98.3 °F), Min:36.4 °C (97.5 °F), Max:37.3 °C (99.2 °F)  Temperature: 36.5 °C (97.7 °F)  Pulse  Av.3  Min: 54  Max: 125Heart Rate (Monitored): 95  Blood Pressure : 116/54, NIBP: 126/64       Physical Exam:  Physical Exam   Constitutional:   Thin   HENT:   Mouth/Throat: No oropharyngeal exudate.   Right parietal dressing   Eyes: Right eye exhibits no discharge. Left eye exhibits no discharge.   Neck: Neck supple. No JVD present.   Cardiovascular: Normal rate and regular rhythm.    No murmur heard.  Pulmonary/Chest: Effort normal. No stridor. No respiratory distress.   Abdominal: Soft. There is no rebound and no guarding.   Musculoskeletal: She exhibits tenderness. She exhibits no edema.   Neurological:   somnolent   Skin: Skin is warm. No rash noted. She is not diaphoretic. No erythema.   Nursing note and vitals reviewed.      Meds:    Current Facility-Administered Medications:   •  acetaminophen  •  magnesium sulfate  •  Pharmacy Consult Request  •  labetalol  •  hydrALAZINE  •  levETIRAcetam (KEPPRA) IV  •  docusate sodium  •  senna-docusate  •  Pharmacy  •  NS **AND** acetaminophen **AND** diphenhydrAMINE **AND** D5W **AND** amphotericin b liposomal (AMBISOME) IV **AND** D5W **AND** NS  •  NS  •  oxyCODONE CR  •  gabapentin  •  magnesium oxide  •  morphine injection  •  lactobacillus rhamnosus  •  oxyCODONE immediate-release  •   sucralfate  •  acetaminophen  •  pramipexole  •  lidocaine  •  temazepam  •  cyclobenzaprine  •  Respiratory Care per Protocol  •  amLODIPine  •  cinacalcet  •  losartan  •  atorvastatin  •  omeprazole  •  metoprolol  •  ondansetron  •  ondansetron    Labs:  Recent Labs      06/28/19 1920 06/29/19 0410 06/30/19   0254   WBC  1.5*  7.7  7.1   RBC  4.32  4.00*  3.87*   HEMOGLOBIN  12.7  11.6*  10.8*   HEMATOCRIT  37.8  34.6*  34.4*   MCV  87.5  86.5  88.9   MCH  29.4  29.0  27.9   RDW  54.1*  52.9*  54.4*   PLATELETCT  221  293  300   MPV  9.1  9.7  9.8   NEUTSPOLYS  70.30   --    --    LYMPHOCYTES  20.70*   --    --    MONOCYTES  2.70   --    --    EOSINOPHILS  1.80   --    --    BASOPHILS  1.80   --    --      Recent Labs      06/28/19 1920 06/29/19 0410 06/30/19   0254   SODIUM  132*  137  136   POTASSIUM  4.5  4.0  3.2*   CHLORIDE  101  101  101   CO2  21  25  27   GLUCOSE  123*  117*  81   BUN  25*  22  15     Recent Labs      06/28/19 1920 06/29/19 0410 06/30/19   0254   ALBUMIN  3.6   --    --    TBILIRUBIN  0.4   --    --    ALKPHOSPHAT  343*   --    --    TOTPROTEIN  7.7   --    --    ALTSGPT  9   --    --    ASTSGOT  28   --    --    CREATININE  0.90  0.71  0.44*       Imaging:  MRI of the brain on 6/8/2019  Impression       1.  Interval progression of supra and infratentorial intra-axial nodules and associated edema. This short-term interval progression favors infection over neoplasm though both remain considerations.  2.  Mild atrophy         Micro:  Results     Procedure Component Value Units Date/Time    CULTURE TISSUE W/ GRM STAIN [[id number]] Collected:  06/28/19 1407    Order Status:  Completed Specimen:  Tissue Updated:  06/30/19 1014     Significant Indicator NEG     Source TISS     Site Right Parietal Lesion     Culture Result No growth at 48 hours.     Gram Stain Result Rare WBCs.  No organisms seen.      Narrative:       Surgery Specimen    AFB Culture [[id number]] Collected:   06/28/19 1403    Order Status:  Completed Specimen:  Tissue Updated:  06/30/19 1014     Significant Indicator NEG     Source TISS     Site Right Parietal Lesion     Culture Result Culture in progress.     AFB Smear Results No acid fast bacilli seen.    Narrative:       Surgery Specimen    Fungal Culture [[id number]] Collected:  06/28/19 1403    Order Status:  Completed Specimen:  Tissue Updated:  06/30/19 1014     Significant Indicator NEG     Source TISS     Site Right Parietal Lesion     Culture Result Culture in progress.    Narrative:       Surgery Specimen    Anaerobic Culture [[id number]] Collected:  06/28/19 1403    Order Status:  Completed Specimen:  Tissue Updated:  06/30/19 1014     Significant Indicator NEG     Source TISS     Site Right Parietal Lesion     Culture Result Culture in progress.    Narrative:       Surgery Specimen    GRAM STAIN [[id number]] Collected:  06/28/19 1403    Order Status:  Completed Specimen:  Tissue Updated:  06/29/19 2315     Significant Indicator .     Source TISS     Site Right Parietal Lesion     Gram Stain Result Rare WBCs.  No organisms seen.      Narrative:       Surgery Specimen    Acid Fast Stain [[id number]] Collected:  06/28/19 1403    Order Status:  Completed Specimen:  Tissue Updated:  06/29/19 2315     Significant Indicator NEG     Source TISS     Site Right Parietal Lesion     AFB Smear Results No acid fast bacilli seen.    Narrative:       Surgery Specimen    Cryptococcal Antigen Titer [[id number]]     Order Status:  Sent Specimen:  Blood from Blood     AFB Culture [[id number]] Collected:  06/19/19 0930    Order Status:  Completed Specimen:  Tissue from Other Body Fluid Updated:  06/25/19 0918     Significant Indicator NEG     Source TISS     Site Right Hip deep bone     Culture Result Culture in progress.     AFB Smear Results No acid fast bacilli seen.    Narrative:       Collected By:[id number] BEATRICE HARRIS  Bone biopsy right hip  Collected By:[id number] MONTERO  "D  Right hip fluid collection  Collected By:[id number] BEATRICE HARRIS    Fungal Smear [[id number]] Collected:  06/19/19 0930    Order Status:  Completed Specimen:  Tissue from Other Body Fluid Updated:  06/25/19 0918     Significant Indicator NEG     Source TISS     Site Right Hip deep bone     Fungal Smear Results No fungal elements seen.    Narrative:       Collected By:[id number] BEATRICE HARRIS  Bone biopsy right hip  Collected By:[id number] BEATRICE HARRIS  Right hip fluid collection  Collected By:[id number] BEATRICE HARRIS    Fungal Culture [[id number]] Collected:  06/19/19 0930    Order Status:  Completed Specimen:  Tissue Updated:  06/25/19 0918     Significant Indicator NEG     Source TISS     Site Right Hip deep bone     Culture Result No fungal growth to date.    Narrative:       Collected By:[id number] BEATRICE HARRIS  Bone biopsy right hip  Collected By:[id number] BEATRICE HARRIS  Right hip fluid collection  Collected By:[id number] BEATRICE HARRIS    CULTURE TISSUE W/ GRM STAIN [[id number]] Collected:  06/19/19 0930    Order Status:  Completed Specimen:  Tissue Updated:  06/25/19 0918     Significant Indicator NEG     Source TISS     Site Right Hip deep bone     Culture Result No growth at 72 hours.     Gram Stain Result No organisms seen.    Narrative:       Collected By:[id number] BEATRICE HARRIS  Bone biopsy right hip  Collected By:[id number] BEATRICE HARRIS  Right hip fluid collection  Collected By:[id number] BEATRICE HARRIS          Assessment:  Gluteal and iliopsoas abscess  Brain abscesses vs mets  Breast cancer  DM2    Plan:  Brain abscesses   CNS fungal infection, on treatment  MRI 4/23 \"supra and infratentorial brain parenchyma. Decrease in the size of the lesions. Interval reduction in the extent of the surrounding white matter edema consistent with improvement/treatment response of the most of the multifocal brain abscesses.  MRI 5/21 showed innumerable microabscesses vs mets  Abx (vancomycin, cefepime and Flagyl) discontinued on 5/23 after ~8 weeks of " "treatment-developed new fevers on 6/4  MRI on 6/8 with interval progression of supra and infratentorial intra--axial nodules and associated edema.  New right thalamus lesion. Radiology favors infection over neoplasm though both remain considerations (had been off abx)  Mult blood cultures neg  CHAS neg 3/15 and 5/24  MRI 6/22 worsening CNS lesiona  s/p biopsy- fungal + by verbal report-nothing in EPIC yet  Continue liposomal Ampho B pending ID of above  Check electrolytes daily and replace as needed    Gluteal and iliopsoas abscess   New OM L5, S1-3  New abscesses, additional work  Adjacent to hardware in right hip (placed 12/17/18)  CT 4/23 \"Fluid collections within the right gluteal and iliacis muscles.. The collection within the right gluteal muscle has unchanged while the fluid collection within the right iliacis muscle is increased somewhat in size.\" 2.7 cm  Cultures 3/29 and 4/4 neg  MRI on 5/20/2019 - interval decrease in collection in R gluteal muscle and persistent multiloculated collection in R iliacus muscle.   CT 5/28 no change  s/p drainage on 5/30/2019-cultures negative  S/p removal hardware 6/5-no cultures done  Panculture neg; 1, 3 beta glucan neg  S/p removal hardware 6/5/19  CT on 6/8 with residual abscess in the right iliacus muscle, 2.5 x 1.8 cm, slightly smaller than prior  s/p CT-guided deep bone biopsy 6/19/2019.  Cultures remain negative; path not reported   MRI 6/28 chronic discitis, diffuse OM L5, 3 and 4 cm abscesses right glute, 1.5 cm abscess or necrosis right psoterior ileum, 3.3 cm abscess right SI joint  Ampho B as above  Drain if feasible-repeat all cultures    Type 2 DM  Hemoglobin A1c 6.3   Keep BS under 150 to help control current infection    Latent TB  Check AFB as above    DW IM                  "

## 2019-06-30 NOTE — PROGRESS NOTES
Neurosurgery Progress Note    Subjective:  No acute events, seen squirming in bed    Exam:    Awake, alert, will say name, trouble understanding her- language vs slurred speech, cabrales's    BP  Min: 114/62  Max: 132/64  Pulse  Av.8  Min: 77  Max: 107  Resp  Av.3  Min: 15  Max: 54  Temp  Av.8 °C (98.3 °F)  Min: 36.4 °C (97.5 °F)  Max: 37.3 °C (99.2 °F)  SpO2  Av.8 %  Min: 91 %  Max: 100 %    No Data Recorded    Recent Labs      190  19   0254   WBC  1.5*  7.7  7.1   RBC  4.32  4.00*  3.87*   HEMOGLOBIN  12.7  11.6*  10.8*   HEMATOCRIT  37.8  34.6*  34.4*   MCV  87.5  86.5  88.9   MCH  29.4  29.0  27.9   MCHC  33.6  33.5*  31.4*   RDW  54.1*  52.9*  54.4*   PLATELETCT  221  293  300   MPV  9.1  9.7  9.8     Recent Labs      19   0254   SODIUM  132*  137  136   POTASSIUM  4.5  4.0  3.2*   CHLORIDE  101  101  101   CO2  21  25  27   GLUCOSE  123*  117*  81   BUN  25*  22  15   CREATININE  0.90  0.71  0.44*   CALCIUM  12.4*  10.3  9.2     Recent Labs      19   1123   APTT  32.5   INR  1.02           Intake/Output       19 07 - 1959 19 07 - 19 0659       Total  Total       Intake    P.O.  350  240 590  --  -- --    P.O. 350 240 590 -- -- --    I.V.  1529.2  380 1909.2  --  -- --    Magnesium Sulfate Volume 262.5 -- 262.5 -- -- --    Volume (mL) (NS infusion 500 mL) 500 -- 500 -- -- --    Volume (mL) (D5W infusion 30 mL) 50 -- 50 -- -- --    Volume (mL) (D5W infusion 30 mL) 0 30 30 -- -- --    Volume (mL) (NS infusion 250 mL) 0 250 250 -- -- --    Volume (mL) (NS infusion) 716.7 100 816.7 -- -- --    IV Piggyback  219.8  212.5 432.3  --  -- --    Volume (mL) (amphotericin B liposome (AMBISOME) 285.5 mg in D5W 150 mL IVPB) 18.8 212.5 231.3 -- -- --    Volume (mL) (ceFAZolin in dextrose (ANCEF) IVPB premix 2 g) 0 -- 0 -- -- --    Volume (mL) (levETIRAcetam  (KEPPRA) 500 mg in  mL IVPB) 200 -- 200 -- -- --    Volume (mL) (calcitonin (MIACALCIN) 200 UNIT/ Units in syringe 1 mL injection) 1 -- 1 -- -- --    Total Intake 2098.9 832.5 2931.4 -- -- --       Output    Urine  1100  300 1400  --  -- --    Number of Times Voided -- 2 x 2 x 1 x -- 1 x    Number of Times Incontinent of Urine -- 1 x 1 x -- -- --    Output (mL) ([REMOVED] Urethral Catheter Latex 16 Fr.) 2498 607 4107 -- -- --    Emesis  --  -- --  --  -- --    Emesis - Number of Times -- 1 x 1 x -- -- --    Stool  --  -- --  --  -- --    Number of Times Stooled 0 x 3 x 3 x -- -- --    Total Output 2622 163 0120 -- -- --       Net I/O     998.9 532.5 1531.4 -- -- --            Intake/Output Summary (Last 24 hours) at 06/30/19 1052  Last data filed at 06/29/19 2238   Gross per 24 hour   Intake          2302.25 ml   Output             1035 ml   Net          1267.25 ml       $ Bladder Scan Results (mL): 608    • acetaminophen  500 mg Q6HRS PRN   • magnesium sulfate  2 g Once   • potassium chloride SA  20 mEq Once   • Pharmacy Consult Request  1 Each PHARMACY TO DOSE   • labetalol  10 mg Q HOUR PRN   • hydrALAZINE  10 mg Q HOUR PRN   • levETIRAcetam (KEPPRA) IV  500 mg Q12HRS   • docusate sodium  100 mg BID   • senna-docusate  1 Tab Nightly   • Pharmacy  1 Each PRN   • NS  500 mL Q24HR    And   • acetaminophen  650 mg Q24HR    And   • diphenhydrAMINE  25 mg Q24HR    And   • D5W  30 mL Q24HR    And   • amphotericin b liposomal (AMBISOME) IV  5 mg/kg Q24HR    And   • D5W  30 mL Q24HR    And   • NS  250 mL Q24HR   • NS   Continuous   • oxyCODONE CR  10 mg Q12HRS   • gabapentin  100 mg TID   • magnesium oxide  400 mg BID   • morphine injection  2 mg Q2HRS PRN   • lactobacillus rhamnosus  1 Cap QDAY with Breakfast   • oxyCODONE immediate-release  5-10 mg Q4HRS PRN   • sucralfate  1 g Q6HRS   • acetaminophen  1,000 mg BID   • pramipexole  1 mg TID   • lidocaine  2 Patch Q24HR   • temazepam  15 mg QHS PRN   •  cyclobenzaprine  10 mg TID PRN   • Respiratory Care per Protocol   Continuous RT   • amLODIPine  10 mg Q DAY   • cinacalcet  60 mg DAILY   • losartan  50 mg Q DAY   • atorvastatin  10 mg Q EVENING   • omeprazole  20 mg DAILY   • metoprolol  25 mg TWICE DAILY   • ondansetron  4 mg Q4HRS PRN   • ondansetron  4 mg Q4HRS PRN     Hospital Day #9  POD #2 s/p crani for biopsy  Hold anticoags  Path- pending  ID following  Activity as tolerated  Continue neuro checks

## 2019-06-30 NOTE — PROGRESS NOTES
2 RN skin check complete with PATRICK Plata.  Devices in place PIVx2 and nasal canula.   Skin assessed under the following devices PIV and nasal canula.   Preventative measures in place including mepilex on sacrum and b/l heels.  Following areas of concern:   · Red moisture fissue in guteal cleft  -  Old abrasions on right achillies from restless legs friction   The following interventions in place turn q2h, moisturizer applied, barrier wipes used    Wound consult placedYES/NO: N\A    Wound reported YES/NO: N\A  Appropriate LDAs opened YES/NO: N\A

## 2019-07-01 LAB
ANION GAP SERPL CALC-SCNC: 9 MMOL/L (ref 0–11.9)
BACTERIA TISS AEROBE CULT: NORMAL
BUN SERPL-MCNC: 13 MG/DL (ref 8–22)
CALCIUM SERPL-MCNC: 8.8 MG/DL (ref 8.5–10.5)
CHLORIDE SERPL-SCNC: 101 MMOL/L (ref 96–112)
CO2 SERPL-SCNC: 26 MMOL/L (ref 20–33)
CREAT SERPL-MCNC: 0.44 MG/DL (ref 0.5–1.4)
ERYTHROCYTE [DISTWIDTH] IN BLOOD BY AUTOMATED COUNT: 55.4 FL (ref 35.9–50)
GLUCOSE SERPL-MCNC: 78 MG/DL (ref 65–99)
GRAM STN SPEC: NORMAL
HCT VFR BLD AUTO: 33.8 % (ref 37–47)
HGB BLD-MCNC: 10.9 G/DL (ref 12–16)
MAGNESIUM SERPL-MCNC: 1.5 MG/DL (ref 1.5–2.5)
MCH RBC QN AUTO: 29 PG (ref 27–33)
MCHC RBC AUTO-ENTMCNC: 32.2 G/DL (ref 33.6–35)
MCV RBC AUTO: 89.9 FL (ref 81.4–97.8)
PLATELET # BLD AUTO: 244 K/UL (ref 164–446)
PMV BLD AUTO: 9.7 FL (ref 9–12.9)
POTASSIUM SERPL-SCNC: 2.9 MMOL/L (ref 3.6–5.5)
RBC # BLD AUTO: 3.76 M/UL (ref 4.2–5.4)
SIGNIFICANT IND 70042: NORMAL
SITE SITE: NORMAL
SODIUM SERPL-SCNC: 136 MMOL/L (ref 135–145)
SOURCE SOURCE: NORMAL
WBC # BLD AUTO: 5.5 K/UL (ref 4.8–10.8)

## 2019-07-01 PROCEDURE — 99232 SBSQ HOSP IP/OBS MODERATE 35: CPT | Performed by: FAMILY MEDICINE

## 2019-07-01 PROCEDURE — 700102 HCHG RX REV CODE 250 W/ 637 OVERRIDE(OP): Performed by: FAMILY MEDICINE

## 2019-07-01 PROCEDURE — A9270 NON-COVERED ITEM OR SERVICE: HCPCS | Performed by: FAMILY MEDICINE

## 2019-07-01 PROCEDURE — 80048 BASIC METABOLIC PNL TOTAL CA: CPT

## 2019-07-01 PROCEDURE — 700105 HCHG RX REV CODE 258: Performed by: FAMILY MEDICINE

## 2019-07-01 PROCEDURE — 97535 SELF CARE MNGMENT TRAINING: CPT

## 2019-07-01 PROCEDURE — 99233 SBSQ HOSP IP/OBS HIGH 50: CPT | Performed by: INTERNAL MEDICINE

## 2019-07-01 PROCEDURE — 97110 THERAPEUTIC EXERCISES: CPT

## 2019-07-01 PROCEDURE — 83735 ASSAY OF MAGNESIUM: CPT

## 2019-07-01 PROCEDURE — 85027 COMPLETE CBC AUTOMATED: CPT

## 2019-07-01 PROCEDURE — 97530 THERAPEUTIC ACTIVITIES: CPT

## 2019-07-01 PROCEDURE — 700111 HCHG RX REV CODE 636 W/ 250 OVERRIDE (IP): Performed by: FAMILY MEDICINE

## 2019-07-01 PROCEDURE — 770006 HCHG ROOM/CARE - MED/SURG/GYN SEMI*

## 2019-07-01 PROCEDURE — 700101 HCHG RX REV CODE 250: Performed by: FAMILY MEDICINE

## 2019-07-01 PROCEDURE — 36415 COLL VENOUS BLD VENIPUNCTURE: CPT

## 2019-07-01 PROCEDURE — 700111 HCHG RX REV CODE 636 W/ 250 OVERRIDE (IP): Mod: JG | Performed by: FAMILY MEDICINE

## 2019-07-01 RX ORDER — POTASSIUM CHLORIDE 20 MEQ/1
40 TABLET, EXTENDED RELEASE ORAL EVERY 4 HOURS
Status: COMPLETED | OUTPATIENT
Start: 2019-07-01 | End: 2019-07-01

## 2019-07-01 RX ORDER — MAGNESIUM SULFATE HEPTAHYDRATE 40 MG/ML
4 INJECTION, SOLUTION INTRAVENOUS ONCE
Status: COMPLETED | OUTPATIENT
Start: 2019-07-01 | End: 2019-07-01

## 2019-07-01 RX ADMIN — SUCRALFATE 1 G: 1 SUSPENSION ORAL at 11:25

## 2019-07-01 RX ADMIN — Medication 1 CAPSULE: at 05:45

## 2019-07-01 RX ADMIN — POTASSIUM CHLORIDE 40 MEQ: 20 TABLET, EXTENDED RELEASE ORAL at 10:06

## 2019-07-01 RX ADMIN — SUCRALFATE 1 G: 1 SUSPENSION ORAL at 00:39

## 2019-07-01 RX ADMIN — AMPHOTERICIN B 285.5 MG: 50 INJECTION, POWDER, LYOPHILIZED, FOR SOLUTION INTRAVENOUS at 17:32

## 2019-07-01 RX ADMIN — LIDOCAINE 2 PATCH: 50 PATCH CUTANEOUS at 10:08

## 2019-07-01 RX ADMIN — MAGNESIUM OXIDE TAB 400 MG (241.3 MG ELEMENTAL MG) 400 MG: 400 (241.3 MG) TAB at 17:27

## 2019-07-01 RX ADMIN — OMEPRAZOLE 20 MG: 20 CAPSULE, DELAYED RELEASE ORAL at 05:16

## 2019-07-01 RX ADMIN — GABAPENTIN 100 MG: 100 CAPSULE ORAL at 11:23

## 2019-07-01 RX ADMIN — OXYCODONE HYDROCHLORIDE 5 MG: 5 TABLET ORAL at 05:16

## 2019-07-01 RX ADMIN — SODIUM CHLORIDE 500 MG: 9 INJECTION, SOLUTION INTRAVENOUS at 05:20

## 2019-07-01 RX ADMIN — SODIUM CHLORIDE 500 ML: 9 INJECTION, SOLUTION INTRAVENOUS at 16:03

## 2019-07-01 RX ADMIN — OXYCODONE HYDROCHLORIDE 5 MG: 5 TABLET ORAL at 00:39

## 2019-07-01 RX ADMIN — SODIUM CHLORIDE 500 MG: 9 INJECTION, SOLUTION INTRAVENOUS at 17:32

## 2019-07-01 RX ADMIN — OXYCODONE HYDROCHLORIDE 5 MG: 5 TABLET ORAL at 13:10

## 2019-07-01 RX ADMIN — DIPHENHYDRAMINE HCL 25 MG: 25 TABLET ORAL at 16:46

## 2019-07-01 RX ADMIN — Medication 1 CAPSULE: at 05:18

## 2019-07-01 RX ADMIN — PRAMIPEXOLE DIHYDROCHLORIDE 1 MG: 0.5 TABLET ORAL at 11:24

## 2019-07-01 RX ADMIN — SODIUM CHLORIDE: 9 INJECTION, SOLUTION INTRAVENOUS at 11:43

## 2019-07-01 RX ADMIN — CINACALCET HYDROCHLORIDE 60 MG: 30 TABLET, COATED ORAL at 11:23

## 2019-07-01 RX ADMIN — OXYCODONE HYDROCHLORIDE 10 MG: 10 TABLET, FILM COATED, EXTENDED RELEASE ORAL at 17:27

## 2019-07-01 RX ADMIN — PRAMIPEXOLE DIHYDROCHLORIDE 1 MG: 0.5 TABLET ORAL at 05:17

## 2019-07-01 RX ADMIN — POTASSIUM CHLORIDE 40 MEQ: 20 TABLET, EXTENDED RELEASE ORAL at 13:10

## 2019-07-01 RX ADMIN — ACETAMINOPHEN 650 MG: 325 TABLET, FILM COATED ORAL at 16:46

## 2019-07-01 RX ADMIN — DEXTROSE MONOHYDRATE 30 ML: 50 INJECTION, SOLUTION INTRAVENOUS at 19:15

## 2019-07-01 RX ADMIN — OXYCODONE HYDROCHLORIDE 5 MG: 5 TABLET ORAL at 22:25

## 2019-07-01 RX ADMIN — METOPROLOL TARTRATE 25 MG: 25 TABLET, FILM COATED ORAL at 05:18

## 2019-07-01 RX ADMIN — DEXTROSE MONOHYDRATE 30 ML: 50 INJECTION, SOLUTION INTRAVENOUS at 20:00

## 2019-07-01 RX ADMIN — PRAMIPEXOLE DIHYDROCHLORIDE 1 MG: 0.5 TABLET ORAL at 17:27

## 2019-07-01 RX ADMIN — ACETAMINOPHEN 500 MG: 500 TABLET ORAL at 22:26

## 2019-07-01 RX ADMIN — ACETAMINOPHEN 1000 MG: 500 TABLET ORAL at 05:17

## 2019-07-01 RX ADMIN — DEXTROSE MONOHYDRATE 30 ML: 50 INJECTION, SOLUTION INTRAVENOUS at 17:32

## 2019-07-01 RX ADMIN — MAGNESIUM OXIDE TAB 400 MG (241.3 MG ELEMENTAL MG) 400 MG: 400 (241.3 MG) TAB at 05:19

## 2019-07-01 RX ADMIN — ATORVASTATIN CALCIUM 10 MG: 10 TABLET, FILM COATED ORAL at 17:27

## 2019-07-01 RX ADMIN — AMLODIPINE BESYLATE 10 MG: 5 TABLET ORAL at 05:19

## 2019-07-01 RX ADMIN — SUCRALFATE 1 G: 1 SUSPENSION ORAL at 05:19

## 2019-07-01 RX ADMIN — METOPROLOL TARTRATE 25 MG: 25 TABLET, FILM COATED ORAL at 17:28

## 2019-07-01 RX ADMIN — GABAPENTIN 100 MG: 100 CAPSULE ORAL at 17:27

## 2019-07-01 RX ADMIN — MAGNESIUM SULFATE IN WATER 4 G: 40 INJECTION, SOLUTION INTRAVENOUS at 11:23

## 2019-07-01 RX ADMIN — GABAPENTIN 100 MG: 100 CAPSULE ORAL at 05:45

## 2019-07-01 RX ADMIN — SODIUM CHLORIDE 250 ML: 9 INJECTION, SOLUTION INTRAVENOUS at 21:17

## 2019-07-01 RX ADMIN — LOSARTAN POTASSIUM 50 MG: 50 TABLET ORAL at 05:19

## 2019-07-01 ASSESSMENT — ENCOUNTER SYMPTOMS
PHOTOPHOBIA: 0
TINGLING: 0
FEVER: 0
ABDOMINAL PAIN: 0
NECK PAIN: 0
HEMOPTYSIS: 0
TREMORS: 0
DIAPHORESIS: 0
SHORTNESS OF BREATH: 0
BLURRED VISION: 0
DIZZINESS: 0
NAUSEA: 0
MYALGIAS: 1
PALPITATIONS: 0
CHILLS: 0
MYALGIAS: 0
DOUBLE VISION: 0
VOMITING: 0
ORTHOPNEA: 0
SPUTUM PRODUCTION: 0

## 2019-07-01 ASSESSMENT — COGNITIVE AND FUNCTIONAL STATUS - GENERAL
MOVING FROM LYING ON BACK TO SITTING ON SIDE OF FLAT BED: UNABLE
MOVING TO AND FROM BED TO CHAIR: UNABLE
PERSONAL GROOMING: A LOT
CLIMB 3 TO 5 STEPS WITH RAILING: A LOT
TOILETING: TOTAL
TURNING FROM BACK TO SIDE WHILE IN FLAT BAD: UNABLE
SUGGESTED CMS G CODE MODIFIER DAILY ACTIVITY: CL
MOBILITY SCORE: 11
WALKING IN HOSPITAL ROOM: A LITTLE
STANDING UP FROM CHAIR USING ARMS: A LITTLE
SUGGESTED CMS G CODE MODIFIER MOBILITY: CL
DRESSING REGULAR UPPER BODY CLOTHING: A LOT
DAILY ACTIVITIY SCORE: 11
EATING MEALS: A LITTLE
HELP NEEDED FOR BATHING: A LOT
DRESSING REGULAR LOWER BODY CLOTHING: TOTAL

## 2019-07-01 ASSESSMENT — GAIT ASSESSMENTS
DEVIATION: BRADYKINETIC;SHUFFLED GAIT;ANTALGIC
ASSISTIVE DEVICE: FRONT WHEEL WALKER
GAIT LEVEL OF ASSIST: MODERATE ASSIST
DISTANCE (FEET): 5

## 2019-07-01 NOTE — PROGRESS NOTES
Infectious Disease Progress Note    Author: Karen Garcia M.D. Date & Time of service: 7/1/2019  2:11 PM    Chief Complaint:  Brain abscess, gluteal abscess  Vertebral OM    Interval History:  68 y.o. female admitted 5/29/2019 as a transfer from [facility] since 4/30/2019. + diabetes, SANTOSH of right hip with hardware, and breast cancer who was originally admitted to [facility] on 03/08/2019 for right hip and pelvic pain.  Work-up revealed a right iliopsoas lesion, gluteal abscess, and mult brain abscesses  6/1/2019-no fevers.  Continues to complain of significant pain in her hips.  Pain medications are being adjusted.  6/2/2019-no fevers.  Continues to remain off the antibiotics.  Awaiting Ortho evaluation  6/4/2019 patient febrile up to 103.  Having shaking chills.  Is not feeling well.  Denies any specific complaints.  6/10 AF WBC 5.3 somnolent No fever or new complaints  6/11 AF WBC 9 No fever or chills-some pain at surgical site. Limited participation with PT noted  6/12 AF WBC 7.3 somnolent but arousable-no new complaints  6/13 afebrile WBC 9.1 patient complaining of left hip pain.  Denies any headaches  6/14 afebrile, no CBC today.  Reports no new issues, tolerating antibiotics.  States the left hip pain is unchanged.  6/18 afebrile, WBC 6.9, HR 80s, -150s, on room air. Repeat CT on 6/17 showed no change in right iliac fluid collection, changes in right iliac/bilateral sacrum and left iliac suspicious of OM.   6/20- no fevers. C/o hip pain.   6/21/2019 no fevers.  Complains of some swelling at the right hip  6/22/2019-continues to complain of swelling of the right hip.  No fevers.  6/23/2019-no fevers.  The swelling is improved.  The ultrasound did not show any abscess or fluid collection.  6/24/2019 no fevers.  The hip pain is better.  No cough no shortness of breath  6/25/2019-no fevers.  The hip cultures remain negative.  She is scheduled for stereotactic biopsy on Friday.  6/26/2019 no fevers are  noted.  Patient complains of back pain.  In significant distress.  2019-no fevers.  Continues to complain of pain.  No new issues overnight   AF WBC 6.6 planned for MRIs and biopsy today-somnolent   AF WBC 7.7 obtunded prelim path showed fungus on brain specimen   AF somnolent-not awakening to voice-slurred speech noted by Neurosurgery   AF more awake today; not oriented-ate about half of lunch c/o pain in joints  Labs Reviewed, Medications Reviewed, and Wound Reviewed.    Review of Systems:  Review of Systems   Constitutional: Negative for fever.   Gastrointestinal: Negative for abdominal pain, nausea and vomiting.   Musculoskeletal: Positive for joint pain and myalgias.   All other systems reviewed and are negative.    Limited  Hemodynamics:  Temp (24hrs), Av.8 °C (98.3 °F), Min:36.3 °C (97.3 °F), Max:37.2 °C (98.9 °F)  Temperature: 37 °C (98.6 °F)  Pulse  Av.4  Min: 54  Max: 125   Blood Pressure : 123/65       Physical Exam:  Physical Exam   Constitutional:   Thin   HENT:   Mouth/Throat: No oropharyngeal exudate.   Right parietal dressing   Eyes: Pupils are equal, round, and reactive to light. EOM are normal. No scleral icterus.   Neck: Normal range of motion.   Cardiovascular: Normal rate and regular rhythm.    No murmur heard.  Pulmonary/Chest: Effort normal. No respiratory distress.   Abdominal: Soft. She exhibits no distension. There is no tenderness.   Musculoskeletal: She exhibits tenderness. She exhibits no edema.   Lymphadenopathy:     She has no cervical adenopathy.   Neurological:   awake   Skin: Skin is warm. No rash noted. She is not diaphoretic. No erythema.   Nursing note and vitals reviewed.      Meds:    Current Facility-Administered Medications:   •  magnesium sulfate  •  acetaminophen  •  Pharmacy Consult Request  •  labetalol  •  hydrALAZINE  •  levETIRAcetam (KEPPRA) IV  •  docusate sodium  •  senna-docusate  •  Pharmacy  •  NS **AND** acetaminophen **AND**  diphenhydrAMINE **AND** D5W **AND** amphotericin b liposomal (AMBISOME) IV **AND** D5W **AND** NS  •  NS  •  oxyCODONE CR  •  gabapentin  •  magnesium oxide  •  morphine injection  •  lactobacillus rhamnosus  •  oxyCODONE immediate-release  •  sucralfate  •  acetaminophen  •  pramipexole  •  lidocaine  •  temazepam  •  cyclobenzaprine  •  Respiratory Care per Protocol  •  amLODIPine  •  cinacalcet  •  losartan  •  atorvastatin  •  omeprazole  •  metoprolol  •  ondansetron  •  ondansetron    Labs:  Recent Labs      06/28/19 1920 06/29/19 0410 06/30/19 0254 07/01/19   0321   WBC  1.5*  7.7  7.1  5.5   RBC  4.32  4.00*  3.87*  3.76*   HEMOGLOBIN  12.7  11.6*  10.8*  10.9*   HEMATOCRIT  37.8  34.6*  34.4*  33.8*   MCV  87.5  86.5  88.9  89.9   MCH  29.4  29.0  27.9  29.0   RDW  54.1*  52.9*  54.4*  55.4*   PLATELETCT  221  293  300  244   MPV  9.1  9.7  9.8  9.7   NEUTSPOLYS  70.30   --    --    --    LYMPHOCYTES  20.70*   --    --    --    MONOCYTES  2.70   --    --    --    EOSINOPHILS  1.80   --    --    --    BASOPHILS  1.80   --    --    --      Recent Labs      06/29/19 0410 06/30/19 0254 07/01/19   0321   SODIUM  137  136  136   POTASSIUM  4.0  3.2*  2.9*   CHLORIDE  101  101  101   CO2  25  27  26   GLUCOSE  117*  81  78   BUN  22  15  13     Recent Labs      06/28/19 1920 06/29/19 0410 06/30/19 0254 07/01/19   0321   ALBUMIN  3.6   --    --    --    TBILIRUBIN  0.4   --    --    --    ALKPHOSPHAT  343*   --    --    --    TOTPROTEIN  7.7   --    --    --    ALTSGPT  9   --    --    --    ASTSGOT  28   --    --    --    CREATININE  0.90  0.71  0.44*  0.44*       Imaging:  MRI of the brain on 6/8/2019  Impression       1.  Interval progression of supra and infratentorial intra-axial nodules and associated edema. This short-term interval progression favors infection over neoplasm though both remain considerations.  2.  Mild atrophy         Micro:  Results     Procedure Component Value Units  Date/Time    Anaerobic Culture [[id number]] Collected:  06/28/19 1403    Order Status:  Completed Specimen:  Tissue Updated:  07/01/19 0852     Significant Indicator NEG     Source TISS     Site Right Parietal Lesion     Culture Result Culture in progress.    Narrative:       Surgery Specimen    CULTURE TISSUE W/ GRM STAIN [[id number]] Collected:  06/28/19 1403    Order Status:  Completed Specimen:  Tissue Updated:  07/01/19 0852     Significant Indicator NEG     Source TISS     Site Right Parietal Lesion     Culture Result No growth at 72 hours.     Gram Stain Result Rare WBCs.  No organisms seen.      Narrative:       Surgery Specimen    AFB Culture [[id number]] Collected:  06/28/19 1403    Order Status:  Completed Specimen:  Tissue Updated:  07/01/19 0852     Significant Indicator NEG     Source TISS     Site Right Parietal Lesion     Culture Result Culture in progress.     AFB Smear Results No acid fast bacilli seen.    Narrative:       Surgery Specimen    Fungal Culture [[id number]] Collected:  06/28/19 1403    Order Status:  Completed Specimen:  Tissue Updated:  07/01/19 0852     Significant Indicator NEG     Source TISS     Site Right Parietal Lesion     Culture Result Culture in progress.    Narrative:       Surgery Specimen    GRAM STAIN [[id number]] Collected:  06/28/19 1403    Order Status:  Completed Specimen:  Tissue Updated:  06/29/19 2315     Significant Indicator .     Source TISS     Site Right Parietal Lesion     Gram Stain Result Rare WBCs.  No organisms seen.      Narrative:       Surgery Specimen    Acid Fast Stain [[id number]] Collected:  06/28/19 1403    Order Status:  Completed Specimen:  Tissue Updated:  06/29/19 2315     Significant Indicator NEG     Source TISS     Site Right Parietal Lesion     AFB Smear Results No acid fast bacilli seen.    Narrative:       Surgery Specimen    Cryptococcal Antigen Titer [[id number]]     Order Status:  Sent Specimen:  Blood from Blood     AFB Culture [[id number]]  Collected:  06/19/19 0930    Order Status:  Completed Specimen:  Tissue from Other Body Fluid Updated:  06/25/19 0918     Significant Indicator NEG     Source TISS     Site Right Hip deep bone     Culture Result Culture in progress.     AFB Smear Results No acid fast bacilli seen.    Narrative:       Collected By:[id number] BEATRICE HARRIS  Bone biopsy right hip  Collected By:[id number] BEATRICE HARRIS  Right hip fluid collection  Collected By:[id number] BEATRICE HARRIS    Fungal Smear [[id number]] Collected:  06/19/19 0930    Order Status:  Completed Specimen:  Tissue from Other Body Fluid Updated:  06/25/19 0918     Significant Indicator NEG     Source TISS     Site Right Hip deep bone     Fungal Smear Results No fungal elements seen.    Narrative:       Collected By:[id number] BEATRICE HARRIS  Bone biopsy right hip  Collected By:[id number] BEATRICE HARRIS  Right hip fluid collection  Collected By:[id number] BEATRICE HARRIS    Fungal Culture [[id number]] Collected:  06/19/19 0930    Order Status:  Completed Specimen:  Tissue Updated:  06/25/19 0918     Significant Indicator NEG     Source TISS     Site Right Hip deep bone     Culture Result No fungal growth to date.    Narrative:       Collected By:[id number] BEATRICE HARRIS  Bone biopsy right hip  Collected By:[id number] BEATRICE HARRIS  Right hip fluid collection  Collected By:[id number] BEATRICE HARRIS    CULTURE TISSUE W/ GRM STAIN [[id number]] Collected:  06/19/19 0930    Order Status:  Completed Specimen:  Tissue Updated:  06/25/19 0918     Significant Indicator NEG     Source TISS     Site Right Hip deep bone     Culture Result No growth at 72 hours.     Gram Stain Result No organisms seen.    Narrative:       Collected By:[id number] BEATRICE HARRIS  Bone biopsy right hip  Collected By:[id number] BEATRICE HARRIS  Right hip fluid collection  Collected By:[id number] BEATRICE HARRIS          Assessment:  Gluteal and iliopsoas abscess  Brain abscesses vs mets  Breast cancer  DM2    Plan:  Brain abscesses   CNS fungal infection, on  "treatment  MRI 4/23 \"supra and infratentorial brain parenchyma. Decrease in the size of the lesions. Interval reduction in the extent of the surrounding white matter edema consistent with improvement/treatment response of the most of the multifocal brain abscesses.  MRI 5/21 showed innumerable microabscesses vs mets  Abx (vancomycin, cefepime and Flagyl) discontinued on 5/23 after ~8 weeks of treatment-developed new fevers on 6/4  MRI on 6/8 with interval progression of supra and infratentorial intra--axial nodules and associated edema.  New right thalamus lesion. Radiology favors infection over neoplasm though both remain considerations (had been off abx)  Mult blood cultures neg  CHAS neg 3/15 and 5/24  MRI 6/22 worsening CNS lesiona  s/p biopsy- fungal + by verbal report-nothing in EPIC yet  Continue liposomal Ampho B pending ID of above-path still pending. Cultures neg  Check electrolytes daily and replace as needed    Gluteal and iliopsoas abscess   New OM L5, S1-3  Recurrent abscesses, additional work  Adjacent to hardware in right hip (placed 12/17/18)  CT 4/23 \"Fluid collections within the right gluteal and iliacis muscles.. The collection within the right gluteal muscle has unchanged while the fluid collection within the right iliacis muscle is increased somewhat in size.\" 2.7 cm  Cultures 3/29 and 4/4 neg  MRI on 5/20/2019 - interval decrease in collection in R gluteal muscle and persistent multiloculated collection in R iliacus muscle.   CT 5/28 no change  s/p drainage on 5/30/2019-cultures negative  S/p removal hardware 6/5-no cultures done  Panculture neg; 1, 3 beta glucan neg  S/p removal hardware 6/5/19  CT on 6/8 with residual abscess in the right iliacus muscle, 2.5 x 1.8 cm, slightly smaller than prior  s/p CT-guided deep bone biopsy 6/19/2019.  Cultures remain negative; path not reported   MRI 6/28 chronic discitis, diffuse OM L5, 3 and 4 cm abscesses right glute, 1.5 cm abscess or necrosis right " psoterior ileum, 3.3 cm abscess right SI joint  Ampho B as above  Drain if feasible-repeat all cultures    Type 2 DM  Hemoglobin A1c 6.3   Keep BS under 150 to help control current infection    Latent TB  Check AFB as above    DW IM

## 2019-07-01 NOTE — THERAPY
"Pt seen following crani for biopsy on 6/28/19. Pt continues to require Min - Mod A for functional mobility. Activity tolerance is limited by L LE pain. Encouarged pt to sit in chair for breakfast; however, declined due to L hip pain. Pt will continue to benefit from acute PT interventions, continue with current POC.     Physical Therapy Treatment completed.   Bed Mobility:  Supine to Sit: Minimal Assist  Transfers: Sit to Stand: Moderate Assist (-> Min A depending on surface height)  Gait: Level Of Assist: Moderate Assist with Front-Wheel Walker       Plan of Care: Will benefit from Physical Therapy 3 times per week  Discharge Recommendations: Equipment: Will Continue to Assess for Equipment Needs.   Post-acute therapy: Discharge to a transitional care facility for continued skilled therapy services.     See \"Rehab Therapy-Acute\" Patient Summary Report for complete documentation.       "

## 2019-07-01 NOTE — CARE PLAN
Problem: Safety  Goal: Will remain free from injury  Outcome: PROGRESSING AS EXPECTED  Pt oriented x1-2, forgetful of event and time. Alarms on for safety as pt squirms in bed. Call light with in reach, safety education reinforced.     Problem: Infection  Goal: Will remain free from infection  Outcome: PROGRESSING AS EXPECTED      Problem: Bowel/Gastric:  Goal: Normal bowel function is maintained or improved  Outcome: PROGRESSING AS EXPECTED  5+ Large Formed BM today, incontinent of bowel and bladder 50% of the time. Pt eating 50%-75% of meals today. No n/v.     Problem: Mobility  Goal: Risk for activity intolerance will decrease  Outcome: PROGRESSING AS EXPECTED  Working with PT/OT  Today. Up to commode     Problem: Skin Integrity  Goal: Risk for impaired skin integrity will decrease  Outcome: PROGRESSING AS EXPECTED  Mepilex changed on blanchable erythema on coccyx. R cranium site (guaze and tape) remains CDI.      Problem: Pain Management  Goal: Pain level will decrease to patient's comfort goal  Outcome: PROGRESSING AS EXPECTED  HA controlled with PRN medications.     Problem: Fluid Volume:  Goal: Will maintain balanced intake and output  Outcome: PROGRESSING AS EXPECTED  Adequate PO intake. Adequate UOP.  Mg and K supplemented.     Problem: Urinary Elimination:  Goal: Ability to reestablish a normal urinary elimination pattern will improve  Incontinence.

## 2019-07-01 NOTE — THERAPY
"Occupational Therapy Treatment completed with focus on ADLs, ADL transfers, patient education, cognition and upper extremity function.  Functional Status: Supine > EOB max A, stand-pivot transfers max A, LB dressing total A, toileting total A, self-feeding min A  Plan of Care: Will benefit from Occupational Therapy 3 times per week  Discharge Recommendations:  Equipment Will Continue to Assess for Equipment Needs. Therapy: Recommend post-acute placement for additional occupational therapy services prior to discharge home. Patient can tolerate post-acute therapies at a 5x/week frequency.    See \"Rehab Therapy-Acute\" Patient Summary Report for complete documentation.     Pt seen for OT tx. Received in bed, requesting orange juice. Pt required min A to grasp cup using B hands and bring to mouth. Requested to urinate. Total A to don B socks. Assisted pt to EOB, then pivot with FWW to BSC (heavy assist with all mobility and difficulty coordinating FWW). Once on commode pt c/o 9/10 L hip pain. Attempted to alleviate with support under B feet and behind back. Instructed pt on breath control as she was grunting and very tense. Poor carryover. Pt had large BM. Dep for hygiene. Stand-pivot without AD (therapist assist) to facilitate weight shifts. Planned to introduce LB dressing equipment, however pt in too much pain. Assisted back to supine. Pt unable to bridge due to BLE weakness, hip pain. Rolled to position pad for dep boost up in bed. Pt presents with BUE weakness, likely due to non-use. Instructed on AROM against gravity for strengthening, maintenance of joint integrity (elbow flex/extend, shoulder flexion). Poor demo; needs reinforcement. Pt oriented to hospital, \"my hip\" as reason for hospitalization, to year, but not to month or day. Pt appears mildly confused/lethargic with delayed processing. Pt is limited by: weakness, balance impairment, pain, variable cognition, negatively impacting basic ADL and functional " mobility. Acute OT to continue following to train on compensatory ADL, progress safe transfers and standing activity, assist w OT ith DC planning and DM needs.

## 2019-07-01 NOTE — PROGRESS NOTES
[facility] Medicine Daily Progress Note    Date of Service  7/1/2019    Chief Complaint  68 y.o. female admitted 5/29/2019 with  Ms. Martini is a 68-year-old female who was transferred from [facility] on 5/29/2019 with persistent right gluteal abscesses since sacral fracture repair in December.  She has had multiple IR drainage.  She initially presented with pelvic pain, low back pain, and confusion. She also had new slurred speech. Imaging of the abdomen, pelvis and brain revealed abscesses and she was treated with a full course of IV antibiotics per ID.  Her slurred speech improved and she was able to ambulate with physical therapy. However, repeat imaging showed persistent pelvic abscesses.  MRI brain shows increase in brain lesions.  Her brain lesions are known from prior imaging and there is concern they represent metastasis.  Oncology aware and do not recommend further imaging. Cultures remained negative and a CHAS done by Dr. Potter showed no vegetations. The size of the pelvic abscesses was reviewed by interventional radiology and the case was discussed with Dr. Finley who recommended transfer to [facility] for CT guided drainage. She underwent drainage on 5/30. On 6/4, she became febrile again, so restarted on cefepime and vancomycin per ID and has been on them since.  Right hip hardware was removed on 6/5. Repeat MRI brain on 6/7/19 saw progression of the supra and infratentorial intra-axial nodules with edema, prompting a Neurosurgery consult.  Dr. Nguyen stated lesions are not amendable to biopsy stereotactically, and favors infectious etiology, recommended continued antibiotics, antineuroleptic and repeat brain MRI in 2 weeks (around 6/21).  Repeat CT pelvis on 6/9 showed residual fluid collection 2.5 x 1.8 cm in right iliacus muscle., slightly smaller than before.  Cefepime + vanc were continue. Work up was broadened to include fungal etiology, and she is started on empiric started fluconazole. Repeat CT on 6/17  showed no significant interval change in the size of the right iliacus muscle fluid collections, with changes in the right iliac bone, bilateral sacrum, and left iliac bone suspicious for osteomyelitis, and changes at L5-S1 suspicious for discitis/osteomyelitis, along with hyperdense right gluteal lesion, moderately suspicious for postoperative pseudoaneurysm with adjacent hematoma. IR felt aspiration of the fluid will have low yield, and so recommended biopsy of the nonhealing right iliac fracture area instead. Biopsy of the hip showed no AFB, organism, or fungal elements. MRI of the brain showed innumerable supra and infratentorial enhancing nodules again noted, with multiple lesions appearing somewhat larger and with increased enhancement.    Hospital Course    as per above    Interval Problem Update  none    Consultants/Specialty  Infectious disease  Orthopedic surgery  Neurosurgery, Dr. Nguyen  IR    Code Status  DNR    Disposition  PENDING    Review of Systems  Review of Systems   Constitutional: Negative for chills, diaphoresis and malaise/fatigue.   HENT: Negative for ear discharge, ear pain and nosebleeds.    Eyes: Negative for blurred vision, double vision and photophobia.   Respiratory: Negative for hemoptysis, sputum production and shortness of breath.    Cardiovascular: Negative for chest pain, palpitations and orthopnea.   Gastrointestinal: Negative for abdominal pain, nausea and vomiting.   Genitourinary: Negative for dysuria, frequency and urgency.   Musculoskeletal: Negative for myalgias and neck pain.   Skin: Negative for itching and rash.   Neurological: Negative for dizziness, tingling and tremors.        Physical Exam  Temp:  [36.3 °C (97.3 °F)-37.2 °C (98.9 °F)] 37 °C (98.6 °F)  Pulse:  [79-95] 85  Resp:  [16-18] 18  BP: (116-156)/(54-75) 123/65  SpO2:  [90 %-91 %] 90 %    Physical Exam   Constitutional: No distress.   HENT:   Right Ear: External ear normal.   Left Ear: External ear normal.    Eyes: Right eye exhibits no discharge. Left eye exhibits no discharge.   Neck: No JVD present.   Cardiovascular: Normal heart sounds.    Pulmonary/Chest: No stridor. No respiratory distress. She has no wheezes. She has no rales.   Abdominal: She exhibits no distension. There is no tenderness. There is no rebound.   Musculoskeletal: She exhibits no edema or tenderness.   Neurological: She is alert.   Skin: Skin is warm and dry. She is not diaphoretic.       Fluids    Intake/Output Summary (Last 24 hours) at 07/01/19 0942  Last data filed at 06/30/19 1800   Gross per 24 hour   Intake              250 ml   Output              500 ml   Net             -250 ml       Laboratory  Recent Labs      06/29/19   0410  06/30/19   0254  07/01/19   0321   WBC  7.7  7.1  5.5   RBC  4.00*  3.87*  3.76*   HEMOGLOBIN  11.6*  10.8*  10.9*   HEMATOCRIT  34.6*  34.4*  33.8*   MCV  86.5  88.9  89.9   MCH  29.0  27.9  29.0   MCHC  33.5*  31.4*  32.2*   RDW  52.9*  54.4*  55.4*   PLATELETCT  293  300  244   MPV  9.7  9.8  9.7     Recent Labs      06/29/19   0410  06/30/19   0254  07/01/19   0321   SODIUM  137  136  136   POTASSIUM  4.0  3.2*  2.9*   CHLORIDE  101  101  101   CO2  25  27  26   GLUCOSE  117*  81  78   BUN  22  15  13   CREATININE  0.71  0.44*  0.44*   CALCIUM  10.3  9.2  8.8                   Imaging      Assessment/Plan  * Abscess of right iliac muscle and right gluteus medius muscle- (present on admission)   Assessment & Plan    - Recurrent issue since sacral fracture repair in December 2018. Has had multiple IR drainage and antibiotics. Recent cultures remain unrevealing.    - s/p biopsy of the nonhealing right iliac fracture area. Showed no AFB, organism, or fungal elements.  Cultures negative.  on amphotericin B  Ortho and ID inputs are noted     Brain lesion- (present on admission)   Assessment & Plan    Status post brain biopsy  Intra-Op pathology consistent with fungal infection patient   On Amphotericin  And d/w  pharmacist  ID input is noted  D/w pharmacy for kdur and mag replacements  Neurosurgery input is noted  Continue Keppra  CHAS neg 3/15 and 5/24  Cultures 3/29 and 4/4 neg  MRI on 5/20/2019 - interval decrease in collection in R gluteal muscle and persistent multiloculated collection in R iliacus muscle.   CT 5/28 no change  s/p drainage on 5/30/2019-cultures negative  S/p removal hardware 6/5-no cultures done  Panculture neg; 1, 3 beta glucan neg  S/p removal hardware 6/5/19  CT on 6/8 with residual abscess in the right iliacus muscle, 2.5 x 1.8 cm, slightly smaller than prior  s/p CT-guided deep bone biopsy 6/19/2019.  Cultures remain negative; path not reported   MRI 6/28 chronic discitis, diffuse OM L5, 3 and 4 cm abscesses right glute, 1.5 cm abscess or necrosis right psoterior ileum, 3.3 cm abscess right SI joint  Ortho input is noted     Sepsis (HCC)   Assessment & Plan    -This is sepsis (without associated acute organ dysfunction).      Sepsis resolved  Continue amphotericin and resume and follow-up on final intraoperative cultures     Hypomagnesemia- (present on admission)   Assessment & Plan    Iv mag sulfate  D/w pharmacy     Hypercalcemia- (present on admission)   Assessment & Plan    Has resolved     Acquired circulating anticoagulants (HCC)- (present on admission)   Assessment & Plan    Anticoagulation on hold given recent brain biopsy     Non-severe protein-calorie malnutrition (HCC)- (present on admission)   Assessment & Plan    Advance diet as tolerated  Dietary supplements       History of DVT (deep vein thrombosis)- (present on admission)   Assessment & Plan    Hold anticoagulation given recent brain biopsy and lumbar osteomyelitis  Restart anticoagulation once permitted by neurosurgery  If anticoag needs to be held much longer might need ivc filter placement     Essential hypertension- (present on admission)   Assessment & Plan    Continue metoprolol losartan and amlodipine  controlled     Chronic  hyponatremia- (present on admission)   Assessment & Plan    Improved continue to monitor      RLS (restless legs syndrome)- (present on admission)   Assessment & Plan    On Mirapex     Chronic pain- (present on admission)   Assessment & Plan    Continue pain management       History of breast cancer- (present on admission)   Assessment & Plan    -S/p left mastectomy and breast implant.  Follow-up on intraoperative final pathology results     COPD (chronic obstructive pulmonary disease) (HCC)- (present on admission)   Assessment & Plan    RT protocol and oxygen          VTE prophylaxis: scd

## 2019-07-02 LAB
ANION GAP SERPL CALC-SCNC: 10 MMOL/L (ref 0–11.9)
BUN SERPL-MCNC: 12 MG/DL (ref 8–22)
CALCIUM SERPL-MCNC: 8.7 MG/DL (ref 8.5–10.5)
CHLORIDE SERPL-SCNC: 101 MMOL/L (ref 96–112)
CO2 SERPL-SCNC: 25 MMOL/L (ref 20–33)
CREAT SERPL-MCNC: 0.51 MG/DL (ref 0.5–1.4)
ERYTHROCYTE [DISTWIDTH] IN BLOOD BY AUTOMATED COUNT: 55.2 FL (ref 35.9–50)
GLUCOSE SERPL-MCNC: 96 MG/DL (ref 65–99)
HCT VFR BLD AUTO: 34 % (ref 37–47)
HGB BLD-MCNC: 10.8 G/DL (ref 12–16)
MAGNESIUM SERPL-MCNC: 1.5 MG/DL (ref 1.5–2.5)
MCH RBC QN AUTO: 28.4 PG (ref 27–33)
MCHC RBC AUTO-ENTMCNC: 31.8 G/DL (ref 33.6–35)
MCV RBC AUTO: 89.5 FL (ref 81.4–97.8)
PLATELET # BLD AUTO: 227 K/UL (ref 164–446)
PMV BLD AUTO: 9.4 FL (ref 9–12.9)
POTASSIUM SERPL-SCNC: 3.1 MMOL/L (ref 3.6–5.5)
RBC # BLD AUTO: 3.8 M/UL (ref 4.2–5.4)
SODIUM SERPL-SCNC: 136 MMOL/L (ref 135–145)
WBC # BLD AUTO: 5.5 K/UL (ref 4.8–10.8)

## 2019-07-02 PROCEDURE — 700102 HCHG RX REV CODE 250 W/ 637 OVERRIDE(OP): Performed by: FAMILY MEDICINE

## 2019-07-02 PROCEDURE — A9270 NON-COVERED ITEM OR SERVICE: HCPCS | Performed by: FAMILY MEDICINE

## 2019-07-02 PROCEDURE — 700102 HCHG RX REV CODE 250 W/ 637 OVERRIDE(OP): Performed by: HOSPITALIST

## 2019-07-02 PROCEDURE — A9270 NON-COVERED ITEM OR SERVICE: HCPCS | Performed by: NURSE PRACTITIONER

## 2019-07-02 PROCEDURE — 85027 COMPLETE CBC AUTOMATED: CPT

## 2019-07-02 PROCEDURE — 700101 HCHG RX REV CODE 250: Performed by: FAMILY MEDICINE

## 2019-07-02 PROCEDURE — 700105 HCHG RX REV CODE 258: Performed by: FAMILY MEDICINE

## 2019-07-02 PROCEDURE — 80048 BASIC METABOLIC PNL TOTAL CA: CPT

## 2019-07-02 PROCEDURE — 700111 HCHG RX REV CODE 636 W/ 250 OVERRIDE (IP): Mod: JG | Performed by: FAMILY MEDICINE

## 2019-07-02 PROCEDURE — 700111 HCHG RX REV CODE 636 W/ 250 OVERRIDE (IP): Performed by: FAMILY MEDICINE

## 2019-07-02 PROCEDURE — 99232 SBSQ HOSP IP/OBS MODERATE 35: CPT | Performed by: INTERNAL MEDICINE

## 2019-07-02 PROCEDURE — A9270 NON-COVERED ITEM OR SERVICE: HCPCS | Performed by: HOSPITALIST

## 2019-07-02 PROCEDURE — 770006 HCHG ROOM/CARE - MED/SURG/GYN SEMI*

## 2019-07-02 PROCEDURE — 700112 HCHG RX REV CODE 229: Performed by: NURSE PRACTITIONER

## 2019-07-02 PROCEDURE — 99232 SBSQ HOSP IP/OBS MODERATE 35: CPT | Performed by: HOSPITALIST

## 2019-07-02 PROCEDURE — 83735 ASSAY OF MAGNESIUM: CPT

## 2019-07-02 PROCEDURE — 700111 HCHG RX REV CODE 636 W/ 250 OVERRIDE (IP): Performed by: HOSPITALIST

## 2019-07-02 PROCEDURE — 36415 COLL VENOUS BLD VENIPUNCTURE: CPT

## 2019-07-02 RX ORDER — MAGNESIUM SULFATE HEPTAHYDRATE 40 MG/ML
4 INJECTION, SOLUTION INTRAVENOUS ONCE
Status: COMPLETED | OUTPATIENT
Start: 2019-07-02 | End: 2019-07-02

## 2019-07-02 RX ORDER — POTASSIUM CHLORIDE 20 MEQ/1
40 TABLET, EXTENDED RELEASE ORAL EVERY 4 HOURS
Status: COMPLETED | OUTPATIENT
Start: 2019-07-02 | End: 2019-07-02

## 2019-07-02 RX ORDER — DIPHENHYDRAMINE HCL 25 MG
25 TABLET ORAL ONCE
Status: COMPLETED | OUTPATIENT
Start: 2019-07-02 | End: 2019-07-02

## 2019-07-02 RX ORDER — POTASSIUM CHLORIDE 20 MEQ/1
20 TABLET, EXTENDED RELEASE ORAL DAILY
Status: DISCONTINUED | OUTPATIENT
Start: 2019-07-03 | End: 2019-07-05

## 2019-07-02 RX ADMIN — Medication 1 CAPSULE: at 06:18

## 2019-07-02 RX ADMIN — SUCRALFATE 1 G: 1 SUSPENSION ORAL at 00:19

## 2019-07-02 RX ADMIN — POTASSIUM CHLORIDE 40 MEQ: 20 TABLET, EXTENDED RELEASE ORAL at 14:24

## 2019-07-02 RX ADMIN — SODIUM CHLORIDE 500 MG: 9 INJECTION, SOLUTION INTRAVENOUS at 06:18

## 2019-07-02 RX ADMIN — ATORVASTATIN CALCIUM 10 MG: 10 TABLET, FILM COATED ORAL at 17:44

## 2019-07-02 RX ADMIN — SUCRALFATE 1 G: 1 SUSPENSION ORAL at 23:44

## 2019-07-02 RX ADMIN — SODIUM CHLORIDE: 9 INJECTION, SOLUTION INTRAVENOUS at 20:25

## 2019-07-02 RX ADMIN — CINACALCET HYDROCHLORIDE 60 MG: 30 TABLET, COATED ORAL at 08:18

## 2019-07-02 RX ADMIN — DIPHENHYDRAMINE HCL 25 MG: 25 TABLET ORAL at 21:15

## 2019-07-02 RX ADMIN — AMLODIPINE BESYLATE 10 MG: 5 TABLET ORAL at 06:18

## 2019-07-02 RX ADMIN — DIPHENHYDRAMINE HCL 25 MG: 25 TABLET ORAL at 18:41

## 2019-07-02 RX ADMIN — SUCRALFATE 1 G: 1 SUSPENSION ORAL at 06:18

## 2019-07-02 RX ADMIN — METOPROLOL TARTRATE 25 MG: 25 TABLET, FILM COATED ORAL at 17:44

## 2019-07-02 RX ADMIN — MAGNESIUM OXIDE TAB 400 MG (241.3 MG ELEMENTAL MG) 400 MG: 400 (241.3 MG) TAB at 06:18

## 2019-07-02 RX ADMIN — POTASSIUM CHLORIDE 40 MEQ: 20 TABLET, EXTENDED RELEASE ORAL at 11:21

## 2019-07-02 RX ADMIN — LIDOCAINE 2 PATCH: 50 PATCH CUTANEOUS at 11:21

## 2019-07-02 RX ADMIN — PRAMIPEXOLE DIHYDROCHLORIDE 1 MG: 0.5 TABLET ORAL at 11:21

## 2019-07-02 RX ADMIN — METOPROLOL TARTRATE 25 MG: 25 TABLET, FILM COATED ORAL at 06:18

## 2019-07-02 RX ADMIN — DEXTROSE MONOHYDRATE 30 ML: 50 INJECTION, SOLUTION INTRAVENOUS at 23:44

## 2019-07-02 RX ADMIN — SODIUM CHLORIDE 500 ML: 9 INJECTION, SOLUTION INTRAVENOUS at 18:17

## 2019-07-02 RX ADMIN — OXYCODONE HYDROCHLORIDE 10 MG: 10 TABLET, FILM COATED, EXTENDED RELEASE ORAL at 17:43

## 2019-07-02 RX ADMIN — LOSARTAN POTASSIUM 50 MG: 50 TABLET ORAL at 06:18

## 2019-07-02 RX ADMIN — OXYCODONE HYDROCHLORIDE 10 MG: 10 TABLET, FILM COATED, EXTENDED RELEASE ORAL at 06:17

## 2019-07-02 RX ADMIN — GABAPENTIN 100 MG: 100 CAPSULE ORAL at 11:21

## 2019-07-02 RX ADMIN — DEXTROSE MONOHYDRATE 30 ML: 50 INJECTION, SOLUTION INTRAVENOUS at 21:39

## 2019-07-02 RX ADMIN — OMEPRAZOLE 20 MG: 20 CAPSULE, DELAYED RELEASE ORAL at 06:18

## 2019-07-02 RX ADMIN — SUCRALFATE 1 G: 1 SUSPENSION ORAL at 11:21

## 2019-07-02 RX ADMIN — ACETAMINOPHEN 650 MG: 325 TABLET, FILM COATED ORAL at 18:41

## 2019-07-02 RX ADMIN — GABAPENTIN 100 MG: 100 CAPSULE ORAL at 17:44

## 2019-07-02 RX ADMIN — AMPHOTERICIN B 285.5 MG: 50 INJECTION, POWDER, LYOPHILIZED, FOR SOLUTION INTRAVENOUS at 21:40

## 2019-07-02 RX ADMIN — ACETAMINOPHEN 1000 MG: 500 TABLET ORAL at 06:17

## 2019-07-02 RX ADMIN — MAGNESIUM SULFATE IN WATER 4 G: 40 INJECTION, SOLUTION INTRAVENOUS at 11:21

## 2019-07-02 RX ADMIN — SUCRALFATE 1 G: 1 SUSPENSION ORAL at 17:43

## 2019-07-02 RX ADMIN — GABAPENTIN 100 MG: 100 CAPSULE ORAL at 06:17

## 2019-07-02 RX ADMIN — PRAMIPEXOLE DIHYDROCHLORIDE 1 MG: 0.5 TABLET ORAL at 06:16

## 2019-07-02 RX ADMIN — PRAMIPEXOLE DIHYDROCHLORIDE 1 MG: 0.5 TABLET ORAL at 17:49

## 2019-07-02 RX ADMIN — SODIUM CHLORIDE 500 MG: 9 INJECTION, SOLUTION INTRAVENOUS at 20:41

## 2019-07-02 ASSESSMENT — LIFESTYLE VARIABLES: SUBSTANCE_ABUSE: 0

## 2019-07-02 ASSESSMENT — ENCOUNTER SYMPTOMS
EYE REDNESS: 0
COUGH: 0
DIARRHEA: 0
EYE DISCHARGE: 0
NECK PAIN: 0
SPEECH CHANGE: 0
FLANK PAIN: 0
STRIDOR: 0
PALPITATIONS: 0
WHEEZING: 0
SHORTNESS OF BREATH: 0
HALLUCINATIONS: 0
FEVER: 0
WEAKNESS: 1
ROS GI COMMENTS: +LOOSE STOOLS
SENSORY CHANGE: 0
FOCAL WEAKNESS: 0
BACK PAIN: 0
MEMORY LOSS: 1
ABDOMINAL PAIN: 0
CHILLS: 0
DIAPHORESIS: 0
VOMITING: 0
MYALGIAS: 1
TREMORS: 0

## 2019-07-02 NOTE — PROGRESS NOTES
Hospital Medicine Daily Progress Note    Date of Service  7/2/2019    Chief Complaint    68 y.o. female admitted 5/29/2019 with pelvic pain and confusion .     Hospital Course      68 y.o. female transferred from [facility] on 5/29/2019 with persistent right gluteal abscesses since sacral fracture repair in December with multiple IR drainages, MRI brain showed increase in brain lesions.  Her brain lesions are known from prior imaging and there is concern they represent metastasis. Oncology aware and do not recommend further imaging.  CHAS done by Dr. Potter showed no vegetations.  Patient had completed IV Vancomycin, Cefepime and flagyl approx 8 week duration around 5-23-19 .  She has had multiple IR drainages.  Cultures remained negative.  She underwent Pelvic abscess drainage on 5/30 with negative results.  On 6/4, she became febrile again, so restarted on cefepime and vancomycin per ID.  Right hip hardware was removed on 6/5.  Repeat MRI brain on 6/7/19 saw progression of the supra and infratentorial intra-axial nodules with edema, prompting a Neurosurgery consult. Dr. Nguyen [facility] favors infectious etiology, recommended continued antibiotics.   Cefepime + vanc were continued.  Work up was broadened to include fungal etiology, and she is started on empiric started fluconazole.   biopsy of the nonhealing right iliac fracture area instead. Biopsy of the hip showed no AFB, organism, or fungal elements.  Repeat CT on 6/17 showed no significant interval change in the size of the right iliacus muscle fluid collections, with changes in the right iliac bone, bilateral sacrum, and left iliac bone suspicious for osteomyelitis, and changes at L5-S1 suspicious for discitis/osteomyelitis, along with hyperdense right gluteal lesion, moderately suspicious for postoperative pseudoaneurysm with adjacent hematoma.   Biopsy of the hip showed no AFB, organism, or fungal elements.  Patient had re evaluation by Dr. Nguyen and she underwent  right-sided craniotomy with resection of right sided superficial mass on 6/28/2019.  Felt likely fungal infection on preliminary findings-patient started on IV Amphotericin.       Interval Problem Update    Brain biopsy results pending.  Electrolyte wasting with persistent low magnesium and potassium on IV amphotericin.  Periods of confusion noted by family friend.    Consultants/Specialty  Neurosurgery, Dr. Nguyen  Pulmonary Dr. Salinas      Code Status    DNR/DNI    Disposition    Plan continue physical therapy, possible skilled nursing facility placement    Review of Systems  Review of Systems   Constitutional: Negative for chills, diaphoresis, fever and malaise/fatigue.   HENT: Negative for congestion.    Eyes: Negative for discharge and redness.   Respiratory: Negative for cough, shortness of breath, wheezing and stridor.    Cardiovascular: Negative for chest pain, palpitations and leg swelling.   Gastrointestinal: Negative for abdominal pain, diarrhea and vomiting.   Genitourinary: Negative for flank pain and hematuria.   Musculoskeletal: Negative for back pain, joint pain and neck pain.   Neurological: Positive for weakness. Negative for tremors, sensory change, speech change and focal weakness.   Psychiatric/Behavioral: Positive for memory loss. Negative for hallucinations and substance abuse.        Physical Exam  Temp:  [36.1 °C (97 °F)-37.2 °C (98.9 °F)] 37.2 °C (98.9 °F)  Pulse:  [70-90] 90  Resp:  [16-18] 17  BP: (112-131)/(55-67) 131/60  SpO2:  [91 %-96 %] 93 %    Physical Exam   Constitutional: She is oriented to person, place, and time. No distress.   HENT:   Right Ear: External ear normal.   Left Ear: External ear normal.   Nose: Nose normal.   Right cranio site dressed   Eyes: EOM are normal. Right eye exhibits no discharge. Left eye exhibits no discharge. No scleral icterus.   Neck: Neck supple. No JVD present.   Cardiovascular: Normal rate and regular rhythm.    No murmur heard.  Pulmonary/Chest:  Effort normal. No stridor. She has no wheezes. She has no rales.   Abdominal: Soft. Bowel sounds are normal. She exhibits no distension. There is no tenderness.   Musculoskeletal: She exhibits no edema or tenderness.   Neurological: She is alert and oriented to person, place, and time.   No focal weakness   Skin: Skin is warm and dry. She is not diaphoretic. No pallor.   Vitals reviewed.      Fluids    Intake/Output Summary (Last 24 hours) at 07/02/19 1525  Last data filed at 07/02/19 0900   Gross per 24 hour   Intake           2753.5 ml   Output                0 ml   Net           2753.5 ml       Laboratory  Recent Labs      06/30/19   0254  07/01/19   0321  07/02/19   0310   WBC  7.1  5.5  5.5   RBC  3.87*  3.76*  3.80*   HEMOGLOBIN  10.8*  10.9*  10.8*   HEMATOCRIT  34.4*  33.8*  34.0*   MCV  88.9  89.9  89.5   MCH  27.9  29.0  28.4   MCHC  31.4*  32.2*  31.8*   RDW  54.4*  55.4*  55.2*   PLATELETCT  300  244  227   MPV  9.8  9.7  9.4     Recent Labs      06/30/19   0254  07/01/19   0321  07/02/19   0310   SODIUM  136  136  136   POTASSIUM  3.2*  2.9*  3.1*   CHLORIDE  101  101  101   CO2  27  26  25   GLUCOSE  81  78  96   BUN  15  13  12   CREATININE  0.44*  0.44*  0.51   CALCIUM  9.2  8.8  8.7                   Imaging  MR-LUMBAR SPINE-WITH & W/O   Final Result         1.  Grade 2-3 spondylolisthesis at the L5-S1 level with bilateral spondylolysis of the pars interarticularis. This causes severe bilateral neural foraminal stenosis at this level.      2.  Interval removal of posterior fusion hardware at the lumbosacral junction.      3.  Diffuse osteomyelitis involving the L5 vertebral body and the first 3 sacral segments.      4.  Chronic discitis at the L5-S1 level with anterior paraspinous abscess at this level and anterior to the S1 sacral segment.      5.  Smaller intraosseous bone bone abscesses or areas of necrosis noted in the sacrum.      6.  There is also evidence of osteomyelitis involving the jose antonio  and sacrum bilaterally along the course of the previous sacral fixation screw. There is a large 4 cm abscess noted in the right gluteal soft tissues in a smaller 3 cm chronic appearing    abscess in the left gluteal musculature.      7.  There is a 1.5 cm intraosseous abscess or area of necrosis in the right posterior ilium.      8.  There is a 3.3 cm centimeters multiloculated abscess anterior to the right sacroiliac joint.      MR-STEALTH BRAIN WITH & W/O   Final Result         1.  Innumerable subcentimeter brain metastases, many at the gray-white junction but also multiple noted throughout the basal ganglia and brainstem.      2.  Largest index lesion is noted in the right thalamus and has increased in size since previous exam and currently measures 9 mm and has a moderate amount of surrounding vasogenic edema.      US-EXTREMITY NON VASCULAR UNILATERAL RIGHT   Final Result      No right hip joint effusion. No soft tissue fluid collection.      MR-BRAIN-WITH & W/O   Final Result      1.  Innumerable supra and infratentorial enhancing nodules are again noted throughout the brain parenchyma with multiple lesions appearing somewhat larger and with increased enhancement. No associated diffusion restriction. Unchanged differential    considerations including bacterial or fungal infection versus metastatic disease.   2.  Mild diffuse cerebral substance loss.   3.  Mild microangiopathic ischemic change versus demyelination or gliosis.      CT-NEEDLE BX-DEEP BONE   Final Result      CT GUIDED RIGHT ILIUM DEEP BONE BIOPSY. SAMPLES WERE SENT TO MICROBIOLOGY FOR ANALYSIS.      CT-PELVIS WITH   Final Result         1.  No significant interval change in the size of the RIGHT iliac muscle fluid collections   2.  Hyperdense RIGHT gluteal lesion moderately suspicious for pseudoaneurysm with adjacent hematoma.   3.  Changes in the RIGHT iliac bone, BILATERAL sacrum and LEFT iliac bone suspicious for osteomyelitis   4.  Changes at  L5-S1 suspicious for discitis osteomyelitis      CT-PELVIS WITH   Final Result      1.  Residual or recurrent abscess within the right iliacus muscle measuring 2.5 x 1.8 cm. It slightly smaller than on 5/28/2019      2.  Interval removal of hardware from the iliac bones and sacrum      3.  Lytic change of the right iliac bone and the sacrum. This could be related to hardware versus osteomyelitis.      4.  Subacute fractures of the right ilium, sacrum, and there is lucency within the left iliac bone that is likely from hardware      MR-BRAIN-WITH & W/O   Final Result      1.  Interval progression of supra and infratentorial intra-axial nodules and associated edema. This short-term interval progression favors infection over neoplasm though both remain considerations.   2.  Mild atrophy      DX-PORTABLE FLUOROSCOPY < 1 HOUR   Final Result         Portable fluoroscopy utilized for 2 minutes.      DX-PELVIS-1 OR 2 VIEWS   Final Result      Status post hardware removal from the right SI joint      DX-CHEST-PORTABLE (1 VIEW)   Final Result      Interstitial prominence could be due to pulmonary edema or hypoventilatory change.      DX-CHEST-LIMITED (1 VIEW)   Final Result      LEFT basilar underinflation atelectasis or pneumonia      CT-DRAIN-HEMATOMA - SEROMA   Final Result      CT-guided RIGHT pelvic fluid collection aspiration, laboratory evaluation pending              Assessment/Plan  * Brain lesion- (present on admission)   Assessment & Plan    With right gluteal , multiloculated right iliac muscle fluid collections.   CHAS neg 3/15 and 5/24.  Cultures 3/29 and 4/4 neg. has completed multiple courses of IV antibiotics .  s/p drainage on 5/30/2019-cultures negative, S/p removal hardware 6/5-no cultures done, Panculture neg; 1, 3 beta glucan neg.  CT on 6/8 with residual abscess in the right iliacus muscle, 2.5 x 1.8 cm, slightly smaller than prior  s/p CT-guided deep bone biopsy 6/19/2019.  Cultures remain negative;  path not reported   MRI 6/28 chronic discitis, diffuse OM L5, 3 and 4 cm abscesses right glute, 1.5 cm abscess or necrosis right psoterior ileum, 3.3 cm abscess right SI joint.  Ortho input is noted  Status post brain biopsy - 6-28-19   Intra-Op pathology consistent with fungal infection patient. Continue IV amphotericin started.  Discussed with ID, Dr. Garcia.   Electrolyte wasting-with persistent hypokalemia, hypomagnesemia  Discussed with pharmacy-we will give IV magnesium and potassium  Neurosurgery input   Continue Keppra             Abscess of right iliac muscle and right gluteus medius muscle- (present on admission)   Assessment & Plan    Recurrent issue since sacral fracture repair in December 2018. Has had multiple IR drainage and antibiotics. Recent cultures remain unrevealing.    s/p biopsy of the nonhealing right iliac fracture area. Showed no AFB, organism, or fungal elements.  Cultures negative.  Ortho and ID inputs as noted  IV amphotericin.  Follow-up brain biopsy pathology     Sepsis (HCC)   Assessment & Plan    This is sepsis (without associated acute organ dysfunction).    Sepsis resolved  Continue amphotericin and resume and follow-up on final intraoperative cultures  Monitor vital signs     Hypomagnesemia- (present on admission)   Assessment & Plan    Persistent   Correct with Iv mag sulfate  Follow-up mag level  D/w pharmacy     Hypercalcemia- (present on admission)   Assessment & Plan    Has resolved  Continue to monitor calcium     Acquired circulating anticoagulants (HCC)- (present on admission)   Assessment & Plan    Anticoagulation on hold given recent brain biopsy     Non-severe protein-calorie malnutrition (HCC)- (present on admission)   Assessment & Plan    Advance diet as tolerated  Dietary supplements       History of DVT (deep vein thrombosis)- (present on admission)   Assessment & Plan    Hold anticoagulation given recent brain biopsy and lumbar osteomyelitis  Restart  anticoagulation once permitted by neurosurgery  If anticoag needs to be held much longer might need ivc filter placement     Essential hypertension- (present on admission)   Assessment & Plan    Continue metoprolol losartan and amlodipine  Monitor blood pressure     Chronic hyponatremia- (present on admission)   Assessment & Plan    Resolved  Continue to monitor sodium     RLS (restless legs syndrome)- (present on admission)   Assessment & Plan    On Mirapex     Chronic pain- (present on admission)   Assessment & Plan    Continue pain management       History of breast cancer- (present on admission)   Assessment & Plan    S/p left mastectomy and breast implant.  Follow-up on intraoperative final pathology results     COPD (chronic obstructive pulmonary disease) (HCC)- (present on admission)   Assessment & Plan    Stable   RT protocol and oxygen          Discussed with multidisciplinary team plan of care.    VTE prophylaxis: SCDs

## 2019-07-02 NOTE — PROGRESS NOTES
Infectious Disease Progress Note    Author: Karen Garcia M.D. Date & Time of service: 7/2/2019  12:34 PM    Chief Complaint:  Brain abscess, gluteal abscess  Vertebral OM    Interval History:  68 y.o. female admitted 5/29/2019 as a transfer from [facility] since 4/30/2019. + diabetes, SANTOSH of right hip with hardware, and breast cancer who was originally admitted to [facility] on 03/08/2019 for right hip and pelvic pain.  Work-up revealed a right iliopsoas lesion, gluteal abscess, and mult brain abscesses  6/1/2019-no fevers.  Continues to complain of significant pain in her hips.  Pain medications are being adjusted.  6/2/2019-no fevers.  Continues to remain off the antibiotics.  Awaiting Ortho evaluation  6/4/2019 patient febrile up to 103.  Having shaking chills.  Is not feeling well.  Denies any specific complaints.  6/10 AF WBC 5.3 somnolent No fever or new complaints  6/11 AF WBC 9 No fever or chills-some pain at surgical site. Limited participation with PT noted  6/12 AF WBC 7.3 somnolent but arousable-no new complaints  6/13 afebrile WBC 9.1 patient complaining of left hip pain.  Denies any headaches  6/14 afebrile, no CBC today.  Reports no new issues, tolerating antibiotics.  States the left hip pain is unchanged.  6/18 afebrile, WBC 6.9, HR 80s, -150s, on room air. Repeat CT on 6/17 showed no change in right iliac fluid collection, changes in right iliac/bilateral sacrum and left iliac suspicious of OM.   6/20- no fevers. C/o hip pain.   6/21/2019 no fevers.  Complains of some swelling at the right hip  6/22/2019-continues to complain of swelling of the right hip.  No fevers.  6/23/2019-no fevers.  The swelling is improved.  The ultrasound did not show any abscess or fluid collection.  6/24/2019 no fevers.  The hip pain is better.  No cough no shortness of breath  6/25/2019-no fevers.  The hip cultures remain negative.  She is scheduled for stereotactic biopsy on Friday.  6/26/2019 no fevers are  noted.  Patient complains of back pain.  In significant distress.  2019-no fevers.  Continues to complain of pain.  No new issues overnight   AF WBC 6.6 planned for MRIs and biopsy today-somnolent   AF WBC 7.7 obtunded prelim path showed fungus on brain specimen   AF somnolent-not awakening to voice-slurred speech noted by Neurosurgery   AF more awake today; not oriented-ate about half of lunch c/o pain in joints   AF WBC 5.5 no new complaints  Labs Reviewed, Medications Reviewed, and Wound Reviewed.    Review of Systems:  Review of Systems   Constitutional: Negative for fever.   Gastrointestinal:        +loose stools   Musculoskeletal: Positive for joint pain and myalgias.   All other systems reviewed and are negative.    Limited  Hemodynamics:  Temp (24hrs), Av.5 °C (97.7 °F), Min:36.1 °C (97 °F), Max:37.2 °C (98.9 °F)  Temperature: 37.2 °C (98.9 °F)  Pulse  Av.3  Min: 54  Max: 125   Blood Pressure : 131/60       Physical Exam:  Physical Exam   Constitutional:   Thin   HENT:   Mouth/Throat: No oropharyngeal exudate.   Right parietal surgical site   Eyes: Pupils are equal, round, and reactive to light. EOM are normal.   Neck: Neck supple. No JVD present.   Cardiovascular: Normal rate and regular rhythm.    No murmur heard.  Pulmonary/Chest: Effort normal. No stridor. She has no wheezes.   Abdominal: Soft. There is no rebound.   Musculoskeletal: She exhibits tenderness. She exhibits no edema.   Neurological:   awake   Skin: Skin is warm. No rash noted. She is not diaphoretic. No erythema.   Nursing note and vitals reviewed.      Meds:    Current Facility-Administered Medications:   •  potassium chloride SA  •  [START ON 7/3/2019] potassium chloride SA  •  magnesium sulfate  •  [START ON 7/3/2019] magnesium chloride  •  acetaminophen  •  Pharmacy Consult Request  •  labetalol  •  hydrALAZINE  •  levETIRAcetam (KEPPRA) IV  •  docusate sodium  •  senna-docusate  •  Pharmacy  •  NS  **AND** acetaminophen **AND** diphenhydrAMINE **AND** D5W **AND** amphotericin b liposomal (AMBISOME) IV **AND** D5W **AND** NS  •  NS  •  oxyCODONE CR  •  gabapentin  •  morphine injection  •  lactobacillus rhamnosus  •  oxyCODONE immediate-release  •  sucralfate  •  acetaminophen  •  pramipexole  •  lidocaine  •  temazepam  •  cyclobenzaprine  •  Respiratory Care per Protocol  •  amLODIPine  •  cinacalcet  •  losartan  •  atorvastatin  •  omeprazole  •  metoprolol  •  ondansetron  •  ondansetron    Labs:  Recent Labs      06/30/19   0254  07/01/19   0321  07/02/19   0310   WBC  7.1  5.5  5.5   RBC  3.87*  3.76*  3.80*   HEMOGLOBIN  10.8*  10.9*  10.8*   HEMATOCRIT  34.4*  33.8*  34.0*   MCV  88.9  89.9  89.5   MCH  27.9  29.0  28.4   RDW  54.4*  55.4*  55.2*   PLATELETCT  300  244  227   MPV  9.8  9.7  9.4     Recent Labs      06/30/19   0254  07/01/19   0321  07/02/19   0310   SODIUM  136  136  136   POTASSIUM  3.2*  2.9*  3.1*   CHLORIDE  101  101  101   CO2  27  26  25   GLUCOSE  81  78  96   BUN  15  13  12     Recent Labs      06/30/19   0254  07/01/19   0321  07/02/19   0310   CREATININE  0.44*  0.44*  0.51       Imaging:  MRI of the brain on 6/8/2019  Impression       1.  Interval progression of supra and infratentorial intra-axial nodules and associated edema. This short-term interval progression favors infection over neoplasm though both remain considerations.  2.  Mild atrophy         Micro:  Results     Procedure Component Value Units Date/Time    Fungal Culture [[id number]] Collected:  06/28/19 1403    Order Status:  Completed Specimen:  Tissue Updated:  07/02/19 1004     Significant Indicator NEG     Source TISS     Site Right Parietal Lesion     Culture Result No fungal growth to date.    Narrative:       Surgery Specimen    Anaerobic Culture [[id number]] Collected:  06/28/19 1403    Order Status:  Completed Specimen:  Tissue Updated:  07/02/19 1004     Significant Indicator NEG     Source TISS      "Site Right Parietal Lesion     Culture Result Culture in progress.    Narrative:       Surgery Specimen    AFB Culture [[id number]] Collected:  06/28/19 1403    Order Status:  Completed Specimen:  Tissue Updated:  07/02/19 1004     Significant Indicator NEG     Source TISS     Site Right Parietal Lesion     Culture Result Culture in progress.     AFB Smear Results No acid fast bacilli seen.    Narrative:       Surgery Specimen    CULTURE TISSUE W/ GRM STAIN [[id number]] Collected:  06/28/19 1403    Order Status:  Completed Specimen:  Tissue Updated:  07/02/19 1004     Significant Indicator NEG     Source TISS     Site Right Parietal Lesion     Culture Result No growth at 72 hours.     Gram Stain Result Rare WBCs.  No organisms seen.      Narrative:       Surgery Specimen    GRAM STAIN [[id number]] Collected:  06/28/19 1403    Order Status:  Completed Specimen:  Tissue Updated:  06/29/19 2315     Significant Indicator .     Source TISS     Site Right Parietal Lesion     Gram Stain Result Rare WBCs.  No organisms seen.      Narrative:       Surgery Specimen    Acid Fast Stain [[id number]] Collected:  06/28/19 1403    Order Status:  Completed Specimen:  Tissue Updated:  06/29/19 2315     Significant Indicator NEG     Source TISS     Site Right Parietal Lesion     AFB Smear Results No acid fast bacilli seen.    Narrative:       Surgery Specimen    Cryptococcal Antigen Titer [[id number]] Collected:  06/28/19 0000    Order Status:  Canceled Specimen:  Other from Blood           Assessment:  Gluteal and iliopsoas abscess  Brain abscesses vs mets  Breast cancer  DM2    Plan:  Brain abscesses   CNS fungal infection, on treatment  MRI 4/23 \"supra and infratentorial brain parenchyma. Decrease in the size of the lesions. Interval reduction in the extent of the surrounding white matter edema consistent with improvement/treatment response of the most of the multifocal brain abscesses.  MRI 5/21 showed innumerable microabscesses vs " "mets  Abx (vancomycin, cefepime and Flagyl) discontinued on 5/23 after ~8 weeks of treatment-developed new fevers on 6/4  MRI on 6/8 with interval progression of supra and infratentorial intra--axial nodules and associated edema.  New right thalamus lesion. Radiology favors infection over neoplasm though both remain considerations (had been off abx)  Mult blood cultures neg  CHAS neg 3/15 and 5/24  MRI 6/22 worsening CNS lesiona  s/p biopsy- fungal + by verbal report-nothing in EPIC yet  COntinue liposomal Ampho B pending ID of above-path still pending. Cultures neg  Check electrolytes daily and replace as needed    Gluteal and iliopsoas abscess   New OM L5, S1-3  Recurrent abscesses, additional work  Adjacent to hardware in right hip (placed 12/17/18)  CT 4/23 \"Fluid collections within the right gluteal and iliacis muscles.. The collection within the right gluteal muscle has unchanged while the fluid collection within the right iliacis muscle is increased somewhat in size.\" 2.7 cm  Cultures 3/29 and 4/4 neg  MRI on 5/20/2019 - interval decrease in collection in R gluteal muscle and persistent multiloculated collection in R iliacus muscle.   CT 5/28 no change  s/p drainage on 5/30/2019-cultures negative  S/p removal hardware 6/5-no cultures done  1, 3 beta glucan neg  CT on 6/8 with residual abscess in the right iliacus muscle, 2.5 x 1.8 cm, slightly smaller than prior  s/p CT-guided deep bone biopsy 6/19/2019.  Cultures remain negative; path not reported   MRI 6/28 chronic discitis, diffuse OM L5, 3 and 4 cm abscesses right glute, 1.5 cm abscess or necrosis right posterior ileum, 3.3 cm abscess right SI joint  Ampho B as above  Drain if feasible-repeat all cultures    Type 2 DM  Hemoglobin A1c 6.3   Keep BS under 150 to help control current infection    Latent TB  Check AFB as above    DW IM DR Aragon  .                          "

## 2019-07-02 NOTE — PROGRESS NOTES
Pt ao 3-4, on RA, slight slurred speech noticed, baseline per night RN. On Regular diet, tolerating well. Incontinent at time, hold stool softeners per night RN d/t multiple loose BM. q2h turn sin place if pt not turns, protective mepilex in place.  Dressing to R head, old drainage noted. No orders to change dressing as of yet. Fall precautions in place. medicated per MAR for pain.

## 2019-07-02 NOTE — PROGRESS NOTES
2 RN skin check with Haim RN  All skin intact with exception to bur hole Right head.  Scattered bruising with +1 edema to lower extremities.  Sacrum red but blanching.  Q2 turns, pillows for position and mepilex on sacrum.

## 2019-07-02 NOTE — DISCHARGE PLANNING
Anticipated Discharge Disposition: SNF    Action: Pt is new to the unit. Transferred in from SICU. Pt will need placement in SNF. [facility] has accepted pt once Medically clear.    Barriers to Discharge: Medical Clearance    Plan: LSW to f/u with MD

## 2019-07-03 LAB
ANION GAP SERPL CALC-SCNC: 9 MMOL/L (ref 0–11.9)
BACTERIA SPEC ANAEROBE CULT: NORMAL
BUN SERPL-MCNC: 11 MG/DL (ref 8–22)
CALCIUM SERPL-MCNC: 8.3 MG/DL (ref 8.5–10.5)
CHLORIDE SERPL-SCNC: 102 MMOL/L (ref 96–112)
CO2 SERPL-SCNC: 22 MMOL/L (ref 20–33)
CREAT SERPL-MCNC: 0.4 MG/DL (ref 0.5–1.4)
ERYTHROCYTE [DISTWIDTH] IN BLOOD BY AUTOMATED COUNT: 53.4 FL (ref 35.9–50)
GLUCOSE SERPL-MCNC: 84 MG/DL (ref 65–99)
HCT VFR BLD AUTO: 33 % (ref 37–47)
HGB BLD-MCNC: 11 G/DL (ref 12–16)
MAGNESIUM SERPL-MCNC: 1.3 MG/DL (ref 1.5–2.5)
MCH RBC QN AUTO: 29 PG (ref 27–33)
MCHC RBC AUTO-ENTMCNC: 33.3 G/DL (ref 33.6–35)
MCV RBC AUTO: 87.1 FL (ref 81.4–97.8)
PHOSPHATE SERPL-MCNC: 3.2 MG/DL (ref 2.5–4.5)
PLATELET # BLD AUTO: 218 K/UL (ref 164–446)
PMV BLD AUTO: 9.7 FL (ref 9–12.9)
POTASSIUM SERPL-SCNC: 3.6 MMOL/L (ref 3.6–5.5)
RBC # BLD AUTO: 3.79 M/UL (ref 4.2–5.4)
SIGNIFICANT IND 70042: NORMAL
SITE SITE: NORMAL
SODIUM SERPL-SCNC: 133 MMOL/L (ref 135–145)
SOURCE SOURCE: NORMAL
WBC # BLD AUTO: 6.4 K/UL (ref 4.8–10.8)

## 2019-07-03 PROCEDURE — 700102 HCHG RX REV CODE 250 W/ 637 OVERRIDE(OP): Performed by: FAMILY MEDICINE

## 2019-07-03 PROCEDURE — A9270 NON-COVERED ITEM OR SERVICE: HCPCS | Performed by: FAMILY MEDICINE

## 2019-07-03 PROCEDURE — 99232 SBSQ HOSP IP/OBS MODERATE 35: CPT | Performed by: HOSPITALIST

## 2019-07-03 PROCEDURE — 700105 HCHG RX REV CODE 258: Performed by: FAMILY MEDICINE

## 2019-07-03 PROCEDURE — 80048 BASIC METABOLIC PNL TOTAL CA: CPT

## 2019-07-03 PROCEDURE — 700101 HCHG RX REV CODE 250: Performed by: FAMILY MEDICINE

## 2019-07-03 PROCEDURE — 83735 ASSAY OF MAGNESIUM: CPT

## 2019-07-03 PROCEDURE — A9270 NON-COVERED ITEM OR SERVICE: HCPCS | Performed by: HOSPITALIST

## 2019-07-03 PROCEDURE — A9270 NON-COVERED ITEM OR SERVICE: HCPCS | Performed by: NURSE PRACTITIONER

## 2019-07-03 PROCEDURE — 700102 HCHG RX REV CODE 250 W/ 637 OVERRIDE(OP): Performed by: NURSE PRACTITIONER

## 2019-07-03 PROCEDURE — A9270 NON-COVERED ITEM OR SERVICE: HCPCS | Performed by: INTERNAL MEDICINE

## 2019-07-03 PROCEDURE — 770006 HCHG ROOM/CARE - MED/SURG/GYN SEMI*

## 2019-07-03 PROCEDURE — 700111 HCHG RX REV CODE 636 W/ 250 OVERRIDE (IP): Performed by: FAMILY MEDICINE

## 2019-07-03 PROCEDURE — 700102 HCHG RX REV CODE 250 W/ 637 OVERRIDE(OP): Performed by: HOSPITALIST

## 2019-07-03 PROCEDURE — 700112 HCHG RX REV CODE 229: Performed by: NURSE PRACTITIONER

## 2019-07-03 PROCEDURE — 84100 ASSAY OF PHOSPHORUS: CPT

## 2019-07-03 PROCEDURE — 85027 COMPLETE CBC AUTOMATED: CPT

## 2019-07-03 PROCEDURE — 36415 COLL VENOUS BLD VENIPUNCTURE: CPT

## 2019-07-03 PROCEDURE — 700111 HCHG RX REV CODE 636 W/ 250 OVERRIDE (IP): Performed by: HOSPITALIST

## 2019-07-03 PROCEDURE — 700102 HCHG RX REV CODE 250 W/ 637 OVERRIDE(OP): Performed by: INTERNAL MEDICINE

## 2019-07-03 PROCEDURE — 99233 SBSQ HOSP IP/OBS HIGH 50: CPT | Performed by: INTERNAL MEDICINE

## 2019-07-03 PROCEDURE — 700111 HCHG RX REV CODE 636 W/ 250 OVERRIDE (IP): Mod: JG | Performed by: FAMILY MEDICINE

## 2019-07-03 RX ORDER — ETHAMBUTOL HYDROCHLORIDE 400 MG/1
800 TABLET, FILM COATED ORAL DAILY
Status: DISCONTINUED | OUTPATIENT
Start: 2019-07-03 | End: 2019-08-10 | Stop reason: HOSPADM

## 2019-07-03 RX ORDER — MAGNESIUM SULFATE HEPTAHYDRATE 40 MG/ML
4 INJECTION, SOLUTION INTRAVENOUS ONCE
Status: COMPLETED | OUTPATIENT
Start: 2019-07-03 | End: 2019-07-03

## 2019-07-03 RX ORDER — LEVETIRACETAM 100 MG/ML
500 SOLUTION ORAL EVERY 12 HOURS
Status: DISCONTINUED | OUTPATIENT
Start: 2019-07-03 | End: 2019-07-03

## 2019-07-03 RX ORDER — ISONIAZID 300 MG/1
300 TABLET ORAL DAILY
Status: DISCONTINUED | OUTPATIENT
Start: 2019-07-03 | End: 2019-08-08

## 2019-07-03 RX ORDER — LEVETIRACETAM 500 MG/1
500 TABLET ORAL 2 TIMES DAILY
Status: DISCONTINUED | OUTPATIENT
Start: 2019-07-03 | End: 2019-08-10 | Stop reason: HOSPADM

## 2019-07-03 RX ORDER — PYRIDOXINE HCL (VITAMIN B6) 50 MG
50 TABLET ORAL DAILY
Status: DISCONTINUED | OUTPATIENT
Start: 2019-07-03 | End: 2019-07-04

## 2019-07-03 RX ORDER — RIFAMPIN 300 MG/1
300 CAPSULE ORAL 2 TIMES DAILY
Status: DISCONTINUED | OUTPATIENT
Start: 2019-07-03 | End: 2019-07-22

## 2019-07-03 RX ORDER — PYRAZINAMIDE TABLET 500 MG/1
1000 TABLET ORAL DAILY
Status: DISCONTINUED | OUTPATIENT
Start: 2019-07-03 | End: 2019-08-10 | Stop reason: HOSPADM

## 2019-07-03 RX ADMIN — ETHAMBUTOL HYDROCHLORIDE 800 MG: 400 TABLET, FILM COATED ORAL at 13:37

## 2019-07-03 RX ADMIN — ISONIAZID 300 MG: 300 TABLET ORAL at 13:48

## 2019-07-03 RX ADMIN — OXYCODONE HYDROCHLORIDE 10 MG: 5 TABLET ORAL at 20:00

## 2019-07-03 RX ADMIN — METOPROLOL TARTRATE 25 MG: 25 TABLET, FILM COATED ORAL at 16:35

## 2019-07-03 RX ADMIN — DEXTROSE MONOHYDRATE 30 ML: 50 INJECTION, SOLUTION INTRAVENOUS at 20:00

## 2019-07-03 RX ADMIN — SUCRALFATE 1 G: 1 SUSPENSION ORAL at 12:18

## 2019-07-03 RX ADMIN — LOSARTAN POTASSIUM 50 MG: 50 TABLET ORAL at 05:12

## 2019-07-03 RX ADMIN — OXYCODONE HYDROCHLORIDE 5 MG: 5 TABLET ORAL at 03:02

## 2019-07-03 RX ADMIN — SODIUM CHLORIDE 500 MG: 9 INJECTION, SOLUTION INTRAVENOUS at 05:12

## 2019-07-03 RX ADMIN — LIDOCAINE 2 PATCH: 50 PATCH CUTANEOUS at 12:18

## 2019-07-03 RX ADMIN — OXYCODONE HYDROCHLORIDE 10 MG: 5 TABLET ORAL at 08:20

## 2019-07-03 RX ADMIN — RIFAMPIN 300 MG: 300 CAPSULE ORAL at 17:45

## 2019-07-03 RX ADMIN — OXYCODONE HYDROCHLORIDE 10 MG: 10 TABLET, FILM COATED, EXTENDED RELEASE ORAL at 05:14

## 2019-07-03 RX ADMIN — PYRAZINAMIDE 1000 MG: 500 TABLET ORAL at 13:37

## 2019-07-03 RX ADMIN — ACETAMINOPHEN 650 MG: 325 TABLET, FILM COATED ORAL at 16:35

## 2019-07-03 RX ADMIN — GABAPENTIN 100 MG: 100 CAPSULE ORAL at 12:18

## 2019-07-03 RX ADMIN — DEXTROSE MONOHYDRATE 30 ML: 50 INJECTION, SOLUTION INTRAVENOUS at 17:40

## 2019-07-03 RX ADMIN — PRAMIPEXOLE DIHYDROCHLORIDE 1 MG: 0.5 TABLET ORAL at 17:45

## 2019-07-03 RX ADMIN — GABAPENTIN 100 MG: 100 CAPSULE ORAL at 16:35

## 2019-07-03 RX ADMIN — METOPROLOL TARTRATE 25 MG: 25 TABLET, FILM COATED ORAL at 05:13

## 2019-07-03 RX ADMIN — POTASSIUM CHLORIDE 20 MEQ: 20 TABLET, EXTENDED RELEASE ORAL at 05:13

## 2019-07-03 RX ADMIN — Medication 1 CAPSULE: at 08:20

## 2019-07-03 RX ADMIN — SODIUM CHLORIDE 250 ML: 9 INJECTION, SOLUTION INTRAVENOUS at 00:21

## 2019-07-03 RX ADMIN — MAGNESIUM SULFATE IN WATER 4 G: 40 INJECTION, SOLUTION INTRAVENOUS at 10:05

## 2019-07-03 RX ADMIN — ATORVASTATIN CALCIUM 10 MG: 10 TABLET, FILM COATED ORAL at 16:34

## 2019-07-03 RX ADMIN — AMPHOTERICIN B 285.5 MG: 50 INJECTION, POWDER, LYOPHILIZED, FOR SOLUTION INTRAVENOUS at 17:51

## 2019-07-03 RX ADMIN — DIPHENHYDRAMINE HCL 25 MG: 25 TABLET ORAL at 16:35

## 2019-07-03 RX ADMIN — DOCUSATE SODIUM 100 MG: 100 CAPSULE, LIQUID FILLED ORAL at 16:35

## 2019-07-03 RX ADMIN — OMEPRAZOLE 20 MG: 20 CAPSULE, DELAYED RELEASE ORAL at 05:12

## 2019-07-03 RX ADMIN — OXYCODONE HYDROCHLORIDE 10 MG: 10 TABLET, FILM COATED, EXTENDED RELEASE ORAL at 16:35

## 2019-07-03 RX ADMIN — GABAPENTIN 100 MG: 100 CAPSULE ORAL at 05:12

## 2019-07-03 RX ADMIN — PYRIDOXINE HCL TAB 50 MG 50 MG: 50 TAB at 13:50

## 2019-07-03 RX ADMIN — ACETAMINOPHEN 1000 MG: 500 TABLET ORAL at 05:13

## 2019-07-03 RX ADMIN — MAGNESIUM 64 MG (MAGNESIUM CHLORIDE) TABLET,DELAYED RELEASE 64 MG: at 17:46

## 2019-07-03 RX ADMIN — LEVETIRACETAM 500 MG: 500 TABLET, FILM COATED ORAL at 16:35

## 2019-07-03 RX ADMIN — AMLODIPINE BESYLATE 10 MG: 5 TABLET ORAL at 05:13

## 2019-07-03 RX ADMIN — CINACALCET HYDROCHLORIDE 60 MG: 30 TABLET, COATED ORAL at 05:12

## 2019-07-03 RX ADMIN — SODIUM CHLORIDE 250 ML: 9 INJECTION, SOLUTION INTRAVENOUS at 20:21

## 2019-07-03 RX ADMIN — MAGNESIUM 64 MG (MAGNESIUM CHLORIDE) TABLET,DELAYED RELEASE 64 MG: at 12:18

## 2019-07-03 RX ADMIN — SODIUM CHLORIDE 500 ML: 9 INJECTION, SOLUTION INTRAVENOUS at 16:34

## 2019-07-03 RX ADMIN — SUCRALFATE 1 G: 1 SUSPENSION ORAL at 05:12

## 2019-07-03 RX ADMIN — MAGNESIUM 64 MG (MAGNESIUM CHLORIDE) TABLET,DELAYED RELEASE 64 MG: at 05:12

## 2019-07-03 RX ADMIN — SENNOSIDES, DOCUSATE SODIUM 1 TABLET: 50; 8.6 TABLET, FILM COATED ORAL at 20:00

## 2019-07-03 RX ADMIN — SUCRALFATE 1 G: 1 SUSPENSION ORAL at 16:34

## 2019-07-03 RX ADMIN — PRAMIPEXOLE DIHYDROCHLORIDE 1 MG: 0.5 TABLET ORAL at 13:37

## 2019-07-03 ASSESSMENT — ENCOUNTER SYMPTOMS
DIAPHORESIS: 0
FOCAL WEAKNESS: 0
WHEEZING: 0
MEMORY LOSS: 1
EYE REDNESS: 0
EYE DISCHARGE: 0
ABDOMINAL PAIN: 0
SPEECH CHANGE: 0
CHILLS: 0
MYALGIAS: 1
NECK PAIN: 0
PALPITATIONS: 0
WEAKNESS: 1
COUGH: 0
NAUSEA: 0
SENSORY CHANGE: 0
VOMITING: 0
STRIDOR: 0
FLANK PAIN: 0
DIARRHEA: 0
TREMORS: 0
BACK PAIN: 1
HEADACHES: 1
SHORTNESS OF BREATH: 0
FEVER: 0

## 2019-07-03 NOTE — PROGRESS NOTES
Hospital Medicine Daily Progress Note    Date of Service  7/3/2019    Chief Complaint    68 y.o. female admitted 5/29/2019 with pelvic pain and confusion .     Hospital Course      68 y.o. female transferred from [facility] on 5/29/2019 with persistent right gluteal abscesses since sacral fracture repair in December with multiple IR drainages, MRI brain showed increase in brain lesions.  Her brain lesions are known from prior imaging and there is concern they represent metastasis. Oncology aware and do not recommend further imaging.  CHAS done by Dr. Potter showed no vegetations.  Patient had completed IV Vancomycin, Cefepime and flagyl approx 8 week duration around 5-23-19 .  She has had multiple IR drainages.  Cultures remained negative.  She underwent Pelvic abscess drainage on 5/30 with negative results.  On 6/4, she became febrile again, so restarted on cefepime and vancomycin per ID.  Right hip hardware was removed on 6/5.  Repeat MRI brain on 6/7/19 saw progression of the supra and infratentorial intra-axial nodules with edema, prompting a Neurosurgery consult. Dr. [place] [facility] [place], recommended continued antibiotics.   Cefepime + vanc were continued.  Work up was broadened to include fungal etiology, and she is started on empiric started fluconazole.  Biopsy of the hip showed no AFB, organism, or fungal elements.  Repeat CT on 6/17 showed no significant interval change in the size of the right iliacus muscle fluid collections, with changes in the right iliac bone, bilateral sacrum, and left iliac bone suspicious for osteomyelitis, and changes at L5-S1 suspicious for discitis/osteomyelitis, along with hyperdense right gluteal lesion, moderately suspicious for postoperative pseudoaneurysm with adjacent hematoma.   Biopsy of the hip showed no AFB, organism, or fungal elements.  Patient had re evaluation by Dr. Nguyen and she underwent right-sided craniotomy with resection of right sided superficial  mass on 6/28/2019.  Felt likely fungal infection on preliminary findings-patient started on IV Amphotericin.       Interval Problem Update    Brain biopsy positive focal necrotizing granulomatous findings with histiocytic  Infiltration.  Discussed with Dr. Garcia-ID-plan to start RIPE for mycobacterial treatment.     Consultants/Specialty  Neurosurgery, Dr. Nguyen  Pulmonary Dr. Salinas      Code Status    DNR/DNI    Disposition    Plan continue physical therapy, possible skilled nursing facility placement    Review of Systems  Review of Systems   Constitutional: Negative for chills, diaphoresis, fever and malaise/fatigue.   HENT: Negative for congestion.    Eyes: Negative for discharge and redness.   Respiratory: Negative for cough, shortness of breath, wheezing and stridor.    Cardiovascular: Negative for chest pain, palpitations and leg swelling.   Gastrointestinal: Negative for abdominal pain, diarrhea and vomiting.   Genitourinary: Negative for flank pain and hematuria.   Musculoskeletal: Positive for back pain. Negative for joint pain and neck pain.        Hip pains   Neurological: Positive for weakness and headaches. Negative for tremors, sensory change, speech change and focal weakness.   Psychiatric/Behavioral: Positive for memory loss.        Physical Exam  Temp:  [36.9 °C (98.4 °F)-37.5 °C (99.5 °F)] 37.2 °C (98.9 °F)  Pulse:  [71-90] 71  Resp:  [16-17] 17  BP: (120-144)/(60-94) 127/60  SpO2:  [91 %-95 %] 91 %    Physical Exam   Constitutional: She is oriented to person, place, and time. No distress.   HENT:   Right Ear: External ear normal.   Left Ear: External ear normal.   Nose: Nose normal.   Right cranio site dressed   Eyes: EOM are normal. Right eye exhibits no discharge. Left eye exhibits no discharge. No scleral icterus.   Neck: Neck supple.   Cardiovascular: Normal rate and regular rhythm.    No murmur heard.  Pulmonary/Chest: Effort normal. No stridor. She has no wheezes. She has no rales.    Abdominal: Soft. Bowel sounds are normal. She exhibits no distension. There is no tenderness.   Musculoskeletal: She exhibits no edema or tenderness.   Neurological: She is alert and oriented to person, place, and time.   No focal weakness   Skin: Skin is warm and dry. She is not diaphoretic. No pallor.   Psychiatric:   Calm, cooperative   Vitals reviewed.      Fluids    Intake/Output Summary (Last 24 hours) at 07/03/19 1441  Last data filed at 07/03/19 0000   Gross per 24 hour   Intake               30 ml   Output                0 ml   Net               30 ml       Laboratory  Recent Labs      07/01/19   0321  07/02/19   0310  07/03/19   0310   WBC  5.5  5.5  6.4   RBC  3.76*  3.80*  3.79*   HEMOGLOBIN  10.9*  10.8*  11.0*   HEMATOCRIT  33.8*  34.0*  33.0*   MCV  89.9  89.5  87.1   MCH  29.0  28.4  29.0   MCHC  32.2*  31.8*  33.3*   RDW  55.4*  55.2*  53.4*   PLATELETCT  244  227  218   MPV  9.7  9.4  9.7     Recent Labs      07/01/19   0321 07/02/19   0310  07/03/19   0310   SODIUM  136  136  133*   POTASSIUM  2.9*  3.1*  3.6   CHLORIDE  101  101  102   CO2  26  25  22   GLUCOSE  78  96  84   BUN  13  12  11   CREATININE  0.44*  0.51  0.40*   CALCIUM  8.8  8.7  8.3*                   Imaging  MR-LUMBAR SPINE-WITH & W/O   Final Result         1.  Grade 2-3 spondylolisthesis at the L5-S1 level with bilateral spondylolysis of the pars interarticularis. This causes severe bilateral neural foraminal stenosis at this level.      2.  Interval removal of posterior fusion hardware at the lumbosacral junction.      3.  Diffuse osteomyelitis involving the L5 vertebral body and the first 3 sacral segments.      4.  Chronic discitis at the L5-S1 level with anterior paraspinous abscess at this level and anterior to the S1 sacral segment.      5.  Smaller intraosseous bone bone abscesses or areas of necrosis noted in the sacrum.      6.  There is also evidence of osteomyelitis involving the jose antonio and sacrum bilaterally along  the course of the previous sacral fixation screw. There is a large 4 cm abscess noted in the right gluteal soft tissues in a smaller 3 cm chronic appearing    abscess in the left gluteal musculature.      7.  There is a 1.5 cm intraosseous abscess or area of necrosis in the right posterior ilium.      8.  There is a 3.3 cm centimeters multiloculated abscess anterior to the right sacroiliac joint.      MR-STEALTH BRAIN WITH & W/O   Final Result         1.  Innumerable subcentimeter brain metastases, many at the gray-white junction but also multiple noted throughout the basal ganglia and brainstem.      2.  Largest index lesion is noted in the right thalamus and has increased in size since previous exam and currently measures 9 mm and has a moderate amount of surrounding vasogenic edema.      US-EXTREMITY NON VASCULAR UNILATERAL RIGHT   Final Result      No right hip joint effusion. No soft tissue fluid collection.      MR-BRAIN-WITH & W/O   Final Result      1.  Innumerable supra and infratentorial enhancing nodules are again noted throughout the brain parenchyma with multiple lesions appearing somewhat larger and with increased enhancement. No associated diffusion restriction. Unchanged differential    considerations including bacterial or fungal infection versus metastatic disease.   2.  Mild diffuse cerebral substance loss.   3.  Mild microangiopathic ischemic change versus demyelination or gliosis.      CT-NEEDLE BX-DEEP BONE   Final Result      CT GUIDED RIGHT ILIUM DEEP BONE BIOPSY. SAMPLES WERE SENT TO MICROBIOLOGY FOR ANALYSIS.      CT-PELVIS WITH   Final Result         1.  No significant interval change in the size of the RIGHT iliac muscle fluid collections   2.  Hyperdense RIGHT gluteal lesion moderately suspicious for pseudoaneurysm with adjacent hematoma.   3.  Changes in the RIGHT iliac bone, BILATERAL sacrum and LEFT iliac bone suspicious for osteomyelitis   4.  Changes at L5-S1 suspicious for discitis  osteomyelitis      CT-PELVIS WITH   Final Result      1.  Residual or recurrent abscess within the right iliacus muscle measuring 2.5 x 1.8 cm. It slightly smaller than on 5/28/2019      2.  Interval removal of hardware from the iliac bones and sacrum      3.  Lytic change of the right iliac bone and the sacrum. This could be related to hardware versus osteomyelitis.      4.  Subacute fractures of the right ilium, sacrum, and there is lucency within the left iliac bone that is likely from hardware      MR-BRAIN-WITH & W/O   Final Result      1.  Interval progression of supra and infratentorial intra-axial nodules and associated edema. This short-term interval progression favors infection over neoplasm though both remain considerations.   2.  Mild atrophy      DX-PORTABLE FLUOROSCOPY < 1 HOUR   Final Result         Portable fluoroscopy utilized for 2 minutes.      DX-PELVIS-1 OR 2 VIEWS   Final Result      Status post hardware removal from the right SI joint      DX-CHEST-PORTABLE (1 VIEW)   Final Result      Interstitial prominence could be due to pulmonary edema or hypoventilatory change.      DX-CHEST-LIMITED (1 VIEW)   Final Result      LEFT basilar underinflation atelectasis or pneumonia      CT-DRAIN-HEMATOMA - SEROMA   Final Result      CT-guided RIGHT pelvic fluid collection aspiration, laboratory evaluation pending              Assessment/Plan  * Brain lesion- (present on admission)   Assessment & Plan    With right gluteal , multiloculated right iliac muscle fluid collections.   CHAS neg 3/15 and 5/24.  Cultures 3/29 and 4/4 neg. has completed multiple courses of IV antibiotics .  s/p drainage on 5/30/2019-cultures negative, S/p removal hardware 6/5-no cultures done, Panculture neg; 1, 3 beta glucan neg.  CT on 6/8 with residual abscess in the right iliacus muscle, 2.5 x 1.8 cm, slightly smaller than prior  s/p CT-guided deep bone biopsy 6/19/2019.  Cultures remain negative; path not reported   MRI 6/28  chronic discitis, diffuse OM L5, 3 and 4 cm abscesses right glute, 1.5 cm abscess or necrosis right psoterior ileum, 3.3 cm abscess right SI joint.  Ortho input is noted  Status post brain biopsy - 6-28-19   Brain biopsy positive-necrotizing granulomas with histiocyte infiltration-may suggest mycobacterial infection.  Positive Q gold .  Amphotericin stopped start RIPE, B6, Discussed with ID Dr. Garcia  Change IV to oral Keppra for seizure prophylaxis                 Abscess of right iliac muscle and right gluteus medius muscle- (present on admission)   Assessment & Plan    Recurrent issue since sacral fracture repair in December 2018. Has had multiple IR drainage and antibiotics. Recent cultures remain unrevealing.    s/p biopsy of the nonhealing right iliac fracture area. Showed no AFB, organism, or fungal elements.  Cultures negative.  Brain biopsy + positive focal necrotizing granulomas-may suggest mycobacterial infection-plan to start RI PE.   Add B6 supplement       Sepsis (HCC)   Assessment & Plan    This is sepsis (without associated acute organ dysfunction).    Sepsis resolved  Continue amphotericin and resume and follow-up on final intraoperative cultures  Monitor vital signs     Hypomagnesemia- (present on admission)   Assessment & Plan    Magnesium decrease  Reviewed place with IV mag  Follow-up mag level       Hypercalcemia- (present on admission)   Assessment & Plan    Has resolved  Continue to monitor calcium     Acquired circulating anticoagulants (HCC)- (present on admission)   Assessment & Plan    Anticoagulation on hold given recent brain biopsy     Non-severe protein-calorie malnutrition (HCC)- (present on admission)   Assessment & Plan    Advance diet as tolerated  Dietary supplements       History of DVT (deep vein thrombosis)- (present on admission)   Assessment & Plan    Hold anticoagulation given recent brain biopsy and lumbar osteomyelitis  Restart anticoagulation once permitted by  neurosurgery       Essential hypertension- (present on admission)   Assessment & Plan    Continue metoprolol losartan and amlodipine  Monitor blood pressure     Chronic hyponatremia- (present on admission)   Assessment & Plan    Resolved  Continue to monitor sodium     RLS (restless legs syndrome)- (present on admission)   Assessment & Plan    On Mirapex     Chronic pain- (present on admission)   Assessment & Plan    Continue pain management       History of breast cancer- (present on admission)   Assessment & Plan    S/p left mastectomy and breast implant.  Follow-up on intraoperative final pathology results     COPD (chronic obstructive pulmonary disease) (HCC)- (present on admission)   Assessment & Plan    Stable   RT protocol and oxygen          Discussed with multidisciplinary team plan of care.    VTE prophylaxis: SCDs    Discussed with multidisciplinary team plan of care.

## 2019-07-03 NOTE — PROGRESS NOTES
2 SN skin check:     Dressing to right side of head, CDI, hypafix tape.   Bruising noted to BUE.   Dime size palpable area to right hip with minimal bruising, pink, blanching.   Elbows pink and blanching.   Sacrum pink, red, blanching, moist. Mepilex removed. Pt assisted off of bed monteiro.   Barrier wipes and cream in use.   Heels pink and blanching, elevated with pillows.

## 2019-07-03 NOTE — PROGRESS NOTES
Spoke with from [place] pharmacy regarding time of last Benadryl dose to time of administering Ambisome. RN was  informed that Benadryl needs to be reordered again an pt to be remedicated in order to administer Ambisome. Primary RN Kenn notified.

## 2019-07-03 NOTE — PROGRESS NOTES
Infectious Disease Progress Note    Author: Karen Garcia M.D. Date & Time of service: 7/3/2019  12:47 PM    Chief Complaint:  Brain abscess, gluteal abscess  Vertebral OM    Interval History:  68 y.o. female admitted 5/29/2019 as a transfer from [facility] since 4/30/2019. + diabetes, SANTOSH of right hip with hardware, and breast cancer who was originally admitted to [facility] on 03/08/2019 for right hip and pelvic pain.  Work-up revealed a right iliopsoas lesion, gluteal abscess, and mult brain abscesses  6/1/2019-no fevers.  Continues to complain of significant pain in her hips.  Pain medications are being adjusted.  6/2/2019-no fevers.  Continues to remain off the antibiotics.  Awaiting Ortho evaluation  6/4/2019 patient febrile up to 103.  Having shaking chills.  Is not feeling well.  Denies any specific complaints.  6/10 AF WBC 5.3 somnolent No fever or new complaints  6/11 AF WBC 9 No fever or chills-some pain at surgical site. Limited participation with PT noted  6/12 AF WBC 7.3 somnolent but arousable-no new complaints  6/13 afebrile WBC 9.1 patient complaining of left hip pain.  Denies any headaches  6/14 afebrile, no CBC today.  Reports no new issues, tolerating antibiotics.  States the left hip pain is unchanged.  6/18 afebrile, WBC 6.9, HR 80s, -150s, on room air. Repeat CT on 6/17 showed no change in right iliac fluid collection, changes in right iliac/bilateral sacrum and left iliac suspicious of OM.   6/20- no fevers. C/o hip pain.   6/21/2019 no fevers.  Complains of some swelling at the right hip  6/22/2019-continues to complain of swelling of the right hip.  No fevers.  6/23/2019-no fevers.  The swelling is improved.  The ultrasound did not show any abscess or fluid collection.  6/24/2019 no fevers.  The hip pain is better.  No cough no shortness of breath  6/25/2019-no fevers.  The hip cultures remain negative.  She is scheduled for stereotactic biopsy on Friday.  6/26/2019 no fevers are  noted.  Patient complains of back pain.  In significant distress.  2019-no fevers.  Continues to complain of pain.  No new issues overnight   AF WBC 6.6 planned for MRIs and biopsy today-somnolent   AF WBC 7.7 obtunded prelim path showed fungus on brain specimen   AF somnolent-not awakening to voice-slurred speech noted by Neurosurgery   AF more awake today; not oriented-ate about half of lunch c/o pain in joints   AF WBC 5.5 no new complaints  7/3 AF still c/o unrelenting right hip pain whenever awake-Falls asleep during exam  Labs Reviewed, Medications Reviewed, and Wound Reviewed.    Review of Systems:  Review of Systems   Constitutional: Negative for fever.   Gastrointestinal: Negative for nausea and vomiting.   Musculoskeletal: Positive for joint pain and myalgias.   All other systems reviewed and are negative.    Limited  Hemodynamics:  Temp (24hrs), Av.3 °C (99.1 °F), Min:36.9 °C (98.4 °F), Max:37.5 °C (99.5 °F)  Temperature: 37.2 °C (98.9 °F)  Pulse  Av.2  Min: 54  Max: 125   Blood Pressure : 127/60       Physical Exam:  Physical Exam   Constitutional:   Thin   HENT:   Mouth/Throat: No oropharyngeal exudate.   Right parietal surgical site dressed no drainage   Eyes: Pupils are equal, round, and reactive to light. EOM are normal.   Neck: Neck supple. No JVD present.   Cardiovascular: Normal rate and regular rhythm.    No murmur heard.  Pulmonary/Chest: Effort normal. No stridor. No respiratory distress. She has no wheezes.   Abdominal: Soft. She exhibits no distension. There is no tenderness.   Musculoskeletal: She exhibits tenderness. She exhibits no edema.   Neurological:   somnolent   Skin: Skin is warm. No rash noted. She is not diaphoretic. No erythema.   Nursing note and vitals reviewed.      Meds:    Current Facility-Administered Medications:   •  magnesium sulfate  •  magnesium chloride  •  levETIRAcetam  •  isoniazid  •  riFAMPin  •  ethambutol  •  pyrazinamide  •   potassium chloride SA  •  acetaminophen  •  Pharmacy Consult Request  •  labetalol  •  hydrALAZINE  •  docusate sodium  •  senna-docusate  •  Pharmacy  •  NS **AND** acetaminophen **AND** diphenhydrAMINE **AND** D5W **AND** amphotericin b liposomal (AMBISOME) IV **AND** D5W **AND** NS  •  NS  •  oxyCODONE CR  •  gabapentin  •  morphine injection  •  lactobacillus rhamnosus  •  oxyCODONE immediate-release  •  sucralfate  •  acetaminophen  •  pramipexole  •  lidocaine  •  temazepam  •  cyclobenzaprine  •  Respiratory Care per Protocol  •  amLODIPine  •  cinacalcet  •  losartan  •  atorvastatin  •  omeprazole  •  metoprolol  •  ondansetron  •  ondansetron    Labs:  Recent Labs      07/01/19 0321 07/02/19 0310 07/03/19   0310   WBC  5.5  5.5  6.4   RBC  3.76*  3.80*  3.79*   HEMOGLOBIN  10.9*  10.8*  11.0*   HEMATOCRIT  33.8*  34.0*  33.0*   MCV  89.9  89.5  87.1   MCH  29.0  28.4  29.0   RDW  55.4*  55.2*  53.4*   PLATELETCT  244  227  218   MPV  9.7  9.4  9.7     Recent Labs      07/01/19   0321 07/02/19   0310  07/03/19   0310   SODIUM  136  136  133*   POTASSIUM  2.9*  3.1*  3.6   CHLORIDE  101  101  102   CO2  26  25  22   GLUCOSE  78  96  84   BUN  13  12  11     Recent Labs      07/01/19   0321 07/02/19   0310  07/03/19   0310   CREATININE  0.44*  0.51  0.40*       Imaging:  MRI of the brain on 6/8/2019  Impression       1.  Interval progression of supra and infratentorial intra-axial nodules and associated edema. This short-term interval progression favors infection over neoplasm though both remain considerations.  2.  Mild atrophy         Micro:  Results     Procedure Component Value Units Date/Time    Fungal Culture [[id number]] Collected:  06/28/19 1403    Order Status:  Completed Specimen:  Tissue Updated:  07/02/19 1004     Significant Indicator NEG     Source TISS     Site Right Parietal Lesion     Culture Result No fungal growth to date.    Narrative:       Surgery Specimen    Anaerobic Culture  "[[id number]] Collected:  06/28/19 1403    Order Status:  Completed Specimen:  Tissue Updated:  07/02/19 1004     Significant Indicator NEG     Source TISS     Site Right Parietal Lesion     Culture Result Culture in progress.    Narrative:       Surgery Specimen    AFB Culture [[id number]] Collected:  06/28/19 1403    Order Status:  Completed Specimen:  Tissue Updated:  07/02/19 1004     Significant Indicator NEG     Source TISS     Site Right Parietal Lesion     Culture Result Culture in progress.     AFB Smear Results No acid fast bacilli seen.    Narrative:       Surgery Specimen    CULTURE TISSUE W/ GRM STAIN [[id number]] Collected:  06/28/19 1403    Order Status:  Completed Specimen:  Tissue Updated:  07/02/19 1004     Significant Indicator NEG     Source TISS     Site Right Parietal Lesion     Culture Result No growth at 72 hours.     Gram Stain Result Rare WBCs.  No organisms seen.      Narrative:       Surgery Specimen    GRAM STAIN [[id number]] Collected:  06/28/19 1403    Order Status:  Completed Specimen:  Tissue Updated:  06/29/19 2315     Significant Indicator .     Source TISS     Site Right Parietal Lesion     Gram Stain Result Rare WBCs.  No organisms seen.      Narrative:       Surgery Specimen    Acid Fast Stain [[id number]] Collected:  06/28/19 1403    Order Status:  Completed Specimen:  Tissue Updated:  06/29/19 2315     Significant Indicator NEG     Source TISS     Site Right Parietal Lesion     AFB Smear Results No acid fast bacilli seen.    Narrative:       Surgery Specimen    Cryptococcal Antigen Titer [[id number]] Collected:  06/28/19 0000    Order Status:  Canceled Specimen:  Other from Blood           Assessment:  Gluteal and iliopsoas abscess  Brain abscesses vs mets  Breast cancer  DM2  +QuantiGold    Plan:  Brain abscesses   CNS fungal infection, on treatment  MRI 4/23 \"supra and infratentorial brain parenchyma. Decrease in the size of the lesions. Interval reduction in the extent of the " "surrounding white matter edema consistent with improvement/treatment response of the most of the multifocal brain abscesses.  MRI 5/21 showed innumerable microabscesses vs mets  Abx (vancomycin, cefepime and Flagyl) discontinued on 5/23 after ~8 weeks of treatment-developed new fevers on 6/4  MRI on 6/8 with interval progression of supra and infratentorial intra--axial nodules and associated edema.  New right thalamus lesion. Radiology favors infection over neoplasm though both remain considerations (had been off abx)  Mult blood cultures neg  CHAS neg 3/15 and 5/24  MRI 6/22 worsening CNS lesions  s/p biopsy- fungal appearing elements seen. +necrotizing granulomas. All stains negative  Continue liposomal Ampho B pending cultures   Check electrolytes daily and replace as needed    Gluteal and iliopsoas abscess   New OM L5, S1-3  Recurrent abscesses, additional work  Adjacent to hardware in right hip (placed 12/17/18)  CT 4/23 \"Fluid collections within the right gluteal and iliacis muscles.. The collection within the right gluteal muscle has unchanged while the fluid collection within the right iliacis muscle is increased somewhat in size.\" 2.7 cm  Cultures 3/29 and 4/4 neg  MRI on 5/20/2019 - interval decrease in collection in R gluteal muscle and persistent multiloculated collection in R iliacus muscle.   CT 5/28 no change  s/p drainage on 5/30/2019-cultures negative  S/p removal hardware 6/5-no cultures done  1, 3 beta glucan neg  CT on 6/8 with residual abscess in the right iliacus muscle, 2.5 x 1.8 cm, slightly smaller than prior  s/p CT-guided deep bone biopsy 6/19/2019.  Cultures remain negative; path not reported   MRI 6/28 chronic discitis, diffuse OM L5, 3 and 4 cm abscesses right glute, 1.5 cm abscess or necrosis right posterior ileum, 3.3 cm abscess right SI joint  Ampho B as above and starting TB therapy  Drain if feasible-repeat all cultures    +QuantiGold  Path with necrotizing granulomas  AFB stain " neg  AFB cultures pending  All prior AFB cxs still neg  Start RIPE +B6    Type 2 DM  Hemoglobin A1c 6.3   Keep BS under 150 to help control current infection      DW IM Dr Esteban  .

## 2019-07-03 NOTE — PROGRESS NOTES
Assumed pt care at 1900. Received bedside report .    Pt Alert and oriented x  3. Pt denies, chest pain, sob, nausea/vomiting, headache, blurry/double vision. Pt denies numbness/tingling. Pt indicates pain in head and hip, see MAR. POC discussed and education provided on administered medications. All questions and concerns addressed. Fall precautions aspiration precautions, seizure precautions, hourly rounding and Q4 hour neuro checks in place.

## 2019-07-04 LAB
ALBUMIN SERPL BCP-MCNC: 3.3 G/DL (ref 3.2–4.9)
ALBUMIN/GLOB SERPL: 0.9 G/DL
ALP SERPL-CCNC: 408 U/L (ref 30–99)
ALT SERPL-CCNC: 14 U/L (ref 2–50)
ANION GAP SERPL CALC-SCNC: 10 MMOL/L (ref 0–11.9)
AST SERPL-CCNC: 31 U/L (ref 12–45)
BASOPHILS # BLD AUTO: 0.6 % (ref 0–1.8)
BASOPHILS # BLD: 0.04 K/UL (ref 0–0.12)
BILIRUB SERPL-MCNC: 0.5 MG/DL (ref 0.1–1.5)
BUN SERPL-MCNC: 15 MG/DL (ref 8–22)
CALCIUM SERPL-MCNC: 8.3 MG/DL (ref 8.5–10.5)
CHLORIDE SERPL-SCNC: 101 MMOL/L (ref 96–112)
CO2 SERPL-SCNC: 22 MMOL/L (ref 20–33)
CREAT SERPL-MCNC: 0.57 MG/DL (ref 0.5–1.4)
EOSINOPHIL # BLD AUTO: 0.35 K/UL (ref 0–0.51)
EOSINOPHIL NFR BLD: 5.1 % (ref 0–6.9)
ERYTHROCYTE [DISTWIDTH] IN BLOOD BY AUTOMATED COUNT: 53.8 FL (ref 35.9–50)
GLOBULIN SER CALC-MCNC: 3.5 G/DL (ref 1.9–3.5)
GLUCOSE SERPL-MCNC: 91 MG/DL (ref 65–99)
HCT VFR BLD AUTO: 31.6 % (ref 37–47)
HGB BLD-MCNC: 10.6 G/DL (ref 12–16)
IMM GRANULOCYTES # BLD AUTO: 0.03 K/UL (ref 0–0.11)
IMM GRANULOCYTES NFR BLD AUTO: 0.4 % (ref 0–0.9)
LYMPHOCYTES # BLD AUTO: 1.19 K/UL (ref 1–4.8)
LYMPHOCYTES NFR BLD: 17.3 % (ref 22–41)
MAGNESIUM SERPL-MCNC: 1.4 MG/DL (ref 1.5–2.5)
MCH RBC QN AUTO: 29.4 PG (ref 27–33)
MCHC RBC AUTO-ENTMCNC: 33.5 G/DL (ref 33.6–35)
MCV RBC AUTO: 87.5 FL (ref 81.4–97.8)
MONOCYTES # BLD AUTO: 0.78 K/UL (ref 0–0.85)
MONOCYTES NFR BLD AUTO: 11.3 % (ref 0–13.4)
NEUTROPHILS # BLD AUTO: 4.5 K/UL (ref 2–7.15)
NEUTROPHILS NFR BLD: 65.3 % (ref 44–72)
NRBC # BLD AUTO: 0 K/UL
NRBC BLD-RTO: 0 /100 WBC
PLATELET # BLD AUTO: 216 K/UL (ref 164–446)
PMV BLD AUTO: 9.4 FL (ref 9–12.9)
POTASSIUM SERPL-SCNC: 3.1 MMOL/L (ref 3.6–5.5)
PROT SERPL-MCNC: 6.8 G/DL (ref 6–8.2)
RBC # BLD AUTO: 3.61 M/UL (ref 4.2–5.4)
SODIUM SERPL-SCNC: 133 MMOL/L (ref 135–145)
WBC # BLD AUTO: 6.9 K/UL (ref 4.8–10.8)

## 2019-07-04 PROCEDURE — 99232 SBSQ HOSP IP/OBS MODERATE 35: CPT | Performed by: HOSPITALIST

## 2019-07-04 PROCEDURE — A9270 NON-COVERED ITEM OR SERVICE: HCPCS | Performed by: NURSE PRACTITIONER

## 2019-07-04 PROCEDURE — 700105 HCHG RX REV CODE 258: Performed by: FAMILY MEDICINE

## 2019-07-04 PROCEDURE — A9270 NON-COVERED ITEM OR SERVICE: HCPCS | Performed by: FAMILY MEDICINE

## 2019-07-04 PROCEDURE — 700102 HCHG RX REV CODE 250 W/ 637 OVERRIDE(OP): Performed by: FAMILY MEDICINE

## 2019-07-04 PROCEDURE — 700102 HCHG RX REV CODE 250 W/ 637 OVERRIDE(OP): Performed by: NURSE PRACTITIONER

## 2019-07-04 PROCEDURE — 700102 HCHG RX REV CODE 250 W/ 637 OVERRIDE(OP): Performed by: HOSPITALIST

## 2019-07-04 PROCEDURE — 85025 COMPLETE CBC W/AUTO DIFF WBC: CPT

## 2019-07-04 PROCEDURE — 700112 HCHG RX REV CODE 229: Performed by: NURSE PRACTITIONER

## 2019-07-04 PROCEDURE — 700111 HCHG RX REV CODE 636 W/ 250 OVERRIDE (IP): Performed by: HOSPITALIST

## 2019-07-04 PROCEDURE — 86635 COCCIDIOIDES ANTIBODY: CPT

## 2019-07-04 PROCEDURE — 99233 SBSQ HOSP IP/OBS HIGH 50: CPT | Performed by: INTERNAL MEDICINE

## 2019-07-04 PROCEDURE — 83735 ASSAY OF MAGNESIUM: CPT

## 2019-07-04 PROCEDURE — 700111 HCHG RX REV CODE 636 W/ 250 OVERRIDE (IP): Mod: JG | Performed by: FAMILY MEDICINE

## 2019-07-04 PROCEDURE — 770006 HCHG ROOM/CARE - MED/SURG/GYN SEMI*

## 2019-07-04 PROCEDURE — 700102 HCHG RX REV CODE 250 W/ 637 OVERRIDE(OP): Performed by: INTERNAL MEDICINE

## 2019-07-04 PROCEDURE — 80053 COMPREHEN METABOLIC PANEL: CPT

## 2019-07-04 PROCEDURE — 36415 COLL VENOUS BLD VENIPUNCTURE: CPT

## 2019-07-04 PROCEDURE — A9270 NON-COVERED ITEM OR SERVICE: HCPCS | Performed by: HOSPITALIST

## 2019-07-04 PROCEDURE — 700101 HCHG RX REV CODE 250: Performed by: FAMILY MEDICINE

## 2019-07-04 PROCEDURE — A9270 NON-COVERED ITEM OR SERVICE: HCPCS | Performed by: INTERNAL MEDICINE

## 2019-07-04 PROCEDURE — 97530 THERAPEUTIC ACTIVITIES: CPT

## 2019-07-04 RX ORDER — PYRIDOXINE HCL (VITAMIN B6) 50 MG
50 TABLET ORAL ONCE
Status: COMPLETED | OUTPATIENT
Start: 2019-07-04 | End: 2019-07-04

## 2019-07-04 RX ORDER — PYRIDOXINE HCL (VITAMIN B6) 50 MG
100 TABLET ORAL DAILY
Status: DISCONTINUED | OUTPATIENT
Start: 2019-07-05 | End: 2019-08-10 | Stop reason: HOSPADM

## 2019-07-04 RX ORDER — POTASSIUM CHLORIDE 20 MEQ/1
40 TABLET, EXTENDED RELEASE ORAL EVERY 4 HOURS
Status: COMPLETED | OUTPATIENT
Start: 2019-07-04 | End: 2019-07-04

## 2019-07-04 RX ORDER — MAGNESIUM SULFATE HEPTAHYDRATE 40 MG/ML
4 INJECTION, SOLUTION INTRAVENOUS ONCE
Status: COMPLETED | OUTPATIENT
Start: 2019-07-04 | End: 2019-07-04

## 2019-07-04 RX ADMIN — LOSARTAN POTASSIUM 50 MG: 50 TABLET ORAL at 04:09

## 2019-07-04 RX ADMIN — PYRIDOXINE HCL TAB 50 MG 50 MG: 50 TAB at 07:58

## 2019-07-04 RX ADMIN — GABAPENTIN 100 MG: 100 CAPSULE ORAL at 13:39

## 2019-07-04 RX ADMIN — LEVETIRACETAM 500 MG: 500 TABLET, FILM COATED ORAL at 04:10

## 2019-07-04 RX ADMIN — ACETAMINOPHEN 650 MG: 325 TABLET, FILM COATED ORAL at 15:19

## 2019-07-04 RX ADMIN — PRAMIPEXOLE DIHYDROCHLORIDE 1 MG: 0.5 TABLET ORAL at 04:09

## 2019-07-04 RX ADMIN — MAGNESIUM 64 MG (MAGNESIUM CHLORIDE) TABLET,DELAYED RELEASE 128 MG: at 18:00

## 2019-07-04 RX ADMIN — POTASSIUM CHLORIDE 20 MEQ: 20 TABLET, EXTENDED RELEASE ORAL at 04:09

## 2019-07-04 RX ADMIN — POTASSIUM CHLORIDE 40 MEQ: 20 TABLET, EXTENDED RELEASE ORAL at 13:40

## 2019-07-04 RX ADMIN — SODIUM CHLORIDE 500 ML: 9 INJECTION, SOLUTION INTRAVENOUS at 15:05

## 2019-07-04 RX ADMIN — SUCRALFATE 1 G: 1 SUSPENSION ORAL at 00:00

## 2019-07-04 RX ADMIN — OXYCODONE HYDROCHLORIDE 10 MG: 5 TABLET ORAL at 21:57

## 2019-07-04 RX ADMIN — DEXTROSE MONOHYDRATE 30 ML: 50 INJECTION, SOLUTION INTRAVENOUS at 15:24

## 2019-07-04 RX ADMIN — AMLODIPINE BESYLATE 10 MG: 5 TABLET ORAL at 04:09

## 2019-07-04 RX ADMIN — METOPROLOL TARTRATE 25 MG: 25 TABLET, FILM COATED ORAL at 18:00

## 2019-07-04 RX ADMIN — OXYCODONE HYDROCHLORIDE 10 MG: 10 TABLET, FILM COATED, EXTENDED RELEASE ORAL at 04:09

## 2019-07-04 RX ADMIN — LEVETIRACETAM 500 MG: 500 TABLET, FILM COATED ORAL at 17:51

## 2019-07-04 RX ADMIN — DIPHENHYDRAMINE HCL 25 MG: 25 TABLET ORAL at 15:19

## 2019-07-04 RX ADMIN — DOCUSATE SODIUM 100 MG: 100 CAPSULE, LIQUID FILLED ORAL at 17:54

## 2019-07-04 RX ADMIN — ATORVASTATIN CALCIUM 10 MG: 10 TABLET, FILM COATED ORAL at 17:52

## 2019-07-04 RX ADMIN — GABAPENTIN 100 MG: 100 CAPSULE ORAL at 17:52

## 2019-07-04 RX ADMIN — SUCRALFATE 1 G: 1 SUSPENSION ORAL at 13:39

## 2019-07-04 RX ADMIN — GABAPENTIN 100 MG: 100 CAPSULE ORAL at 04:10

## 2019-07-04 RX ADMIN — MAGNESIUM 64 MG (MAGNESIUM CHLORIDE) TABLET,DELAYED RELEASE 64 MG: at 07:58

## 2019-07-04 RX ADMIN — MAGNESIUM SULFATE IN WATER 4 G: 40 INJECTION, SOLUTION INTRAVENOUS at 07:58

## 2019-07-04 RX ADMIN — SODIUM CHLORIDE 250 ML: 9 INJECTION, SOLUTION INTRAVENOUS at 19:36

## 2019-07-04 RX ADMIN — SUCRALFATE 1 G: 1 SUSPENSION ORAL at 04:10

## 2019-07-04 RX ADMIN — RIFAMPIN 300 MG: 300 CAPSULE ORAL at 04:12

## 2019-07-04 RX ADMIN — RIFAMPIN 300 MG: 300 CAPSULE ORAL at 19:05

## 2019-07-04 RX ADMIN — PRAMIPEXOLE DIHYDROCHLORIDE 1 MG: 0.5 TABLET ORAL at 13:39

## 2019-07-04 RX ADMIN — OXYCODONE HYDROCHLORIDE 10 MG: 10 TABLET, FILM COATED, EXTENDED RELEASE ORAL at 17:52

## 2019-07-04 RX ADMIN — OXYCODONE HYDROCHLORIDE 10 MG: 5 TABLET ORAL at 13:58

## 2019-07-04 RX ADMIN — PYRAZINAMIDE 1000 MG: 500 TABLET ORAL at 04:09

## 2019-07-04 RX ADMIN — AMPHOTERICIN B 285.5 MG: 50 INJECTION, POWDER, LYOPHILIZED, FOR SOLUTION INTRAVENOUS at 16:50

## 2019-07-04 RX ADMIN — DEXTROSE MONOHYDRATE 30 ML: 50 INJECTION, SOLUTION INTRAVENOUS at 18:51

## 2019-07-04 RX ADMIN — Medication 1 CAPSULE: at 07:58

## 2019-07-04 RX ADMIN — POTASSIUM CHLORIDE 40 MEQ: 20 TABLET, EXTENDED RELEASE ORAL at 10:35

## 2019-07-04 RX ADMIN — ISONIAZID 300 MG: 300 TABLET ORAL at 04:10

## 2019-07-04 RX ADMIN — CINACALCET HYDROCHLORIDE 60 MG: 30 TABLET, COATED ORAL at 04:09

## 2019-07-04 RX ADMIN — METOPROLOL TARTRATE 25 MG: 25 TABLET, FILM COATED ORAL at 04:10

## 2019-07-04 RX ADMIN — ETHAMBUTOL HYDROCHLORIDE 800 MG: 400 TABLET, FILM COATED ORAL at 04:09

## 2019-07-04 RX ADMIN — MAGNESIUM 64 MG (MAGNESIUM CHLORIDE) TABLET,DELAYED RELEASE 64 MG: at 04:09

## 2019-07-04 RX ADMIN — ACETAMINOPHEN 1000 MG: 500 TABLET ORAL at 17:50

## 2019-07-04 RX ADMIN — PRAMIPEXOLE DIHYDROCHLORIDE 1 MG: 0.5 TABLET ORAL at 17:53

## 2019-07-04 RX ADMIN — SUCRALFATE 1 G: 1 SUSPENSION ORAL at 17:51

## 2019-07-04 RX ADMIN — OMEPRAZOLE 20 MG: 20 CAPSULE, DELAYED RELEASE ORAL at 04:09

## 2019-07-04 RX ADMIN — SENNOSIDES, DOCUSATE SODIUM 1 TABLET: 50; 8.6 TABLET, FILM COATED ORAL at 20:28

## 2019-07-04 RX ADMIN — PYRIDOXINE HCL TAB 50 MG 50 MG: 50 TAB at 04:09

## 2019-07-04 RX ADMIN — LIDOCAINE 2 PATCH: 50 PATCH CUTANEOUS at 10:36

## 2019-07-04 RX ADMIN — ACETAMINOPHEN 1000 MG: 500 TABLET ORAL at 04:10

## 2019-07-04 ASSESSMENT — ENCOUNTER SYMPTOMS
FOCAL WEAKNESS: 0
NAUSEA: 0
BACK PAIN: 1
HEADACHES: 1
PALPITATIONS: 0
DIAPHORESIS: 0
SENSORY CHANGE: 0
FLANK PAIN: 0
MYALGIAS: 1
SPEECH CHANGE: 0
WHEEZING: 0
WEAKNESS: 1
MEMORY LOSS: 1
FEVER: 0
VOMITING: 0
COUGH: 0
NECK PAIN: 0
CHILLS: 0
DIARRHEA: 0
SHORTNESS OF BREATH: 0
ABDOMINAL PAIN: 0

## 2019-07-04 ASSESSMENT — GAIT ASSESSMENTS
ASSISTIVE DEVICE: FRONT WHEEL WALKER
GAIT LEVEL OF ASSIST: MODERATE ASSIST
DEVIATION: ANTALGIC;STEP TO;BRADYKINETIC;SHUFFLED GAIT
DISTANCE (FEET): 5

## 2019-07-04 ASSESSMENT — COGNITIVE AND FUNCTIONAL STATUS - GENERAL
SUGGESTED CMS G CODE MODIFIER MOBILITY: CM
CLIMB 3 TO 5 STEPS WITH RAILING: TOTAL
STANDING UP FROM CHAIR USING ARMS: A LOT
MOBILITY SCORE: 8
MOVING FROM LYING ON BACK TO SITTING ON SIDE OF FLAT BED: UNABLE
TURNING FROM BACK TO SIDE WHILE IN FLAT BAD: UNABLE
WALKING IN HOSPITAL ROOM: A LOT
MOVING TO AND FROM BED TO CHAIR: UNABLE

## 2019-07-04 NOTE — PROGRESS NOTES
Hospital Medicine Daily Progress Note    Date of Service  7/4/2019    Chief Complaint    68 y.o. female admitted 5/29/2019 with pelvic pain and confusion .     Hospital Course      68 y.o. female transferred from [facility] on 5/29/2019 with persistent right gluteal abscesses since sacral fracture repair in December with multiple IR drainages, MRI brain showed increase in brain lesions.  Her brain lesions are known from prior imaging and there is concern they represent metastasis. Oncology aware and do not recommend further imaging.  CHAS done by Dr. Potter showed no vegetations.  Patient had completed IV Vancomycin, Cefepime and flagyl approx 8 week duration around 5-23-19 .  She has had multiple IR drainages.  Cultures remained negative.  She underwent Pelvic abscess drainage on 5/30 with negative results.  On 6/4, she became febrile again, so restarted on cefepime and vancomycin per ID.  Right hip hardware was removed on 6/5.  Repeat MRI brain on 6/7/19 saw progression of the supra and infratentorial intra-axial nodules with edema, prompting a Neurosurgery consult. Dr. Nguyen [facility] favors infectious etiology, recommended continued antibiotics.   Cefepime + vanc were continued.  Work up was broadened to include fungal etiology, and she is started on empiric started fluconazole.  Biopsy of the hip showed no AFB, organism, or fungal elements.  Repeat CT on 6/17 showed no significant interval change in the size of the right iliacus muscle fluid collections, with changes in the right iliac bone, bilateral sacrum, and left iliac bone suspicious for osteomyelitis, and changes at L5-S1 suspicious for discitis/osteomyelitis, along with hyperdense right gluteal lesion, moderately suspicious for postoperative pseudoaneurysm with adjacent hematoma.   Biopsy of the hip showed no AFB, organism, or fungal elements.  Patient had re evaluation by Dr. Nguyen and she underwent right-sided craniotomy with resection of right sided superficial  mass on 6/28/2019.  Felt likely fungal infection on preliminary findings-patient started on IV Amphotericin.       Interval Problem Update    Persistent hypomagnesemia with Low K on IV Ampho for possible fungal infxn.  RIPE w B6 started for findings necrotizing granulomas w concern for mycobacterial infxn. More alert, interactive today.    Consultants/Specialty  Neurosurgery, Dr. Nguyen  Pulmonary Dr. Salinas      Code Status    DNR/DNI    Disposition    Plan continue physical therapy, possible skilled nursing facility placement    Review of Systems  Review of Systems   Constitutional: Negative for chills, diaphoresis and fever.   Respiratory: Negative for cough, shortness of breath and wheezing.    Cardiovascular: Negative for chest pain and palpitations.   Gastrointestinal: Negative for abdominal pain, diarrhea and vomiting.   Genitourinary: Negative for flank pain and hematuria.   Musculoskeletal: Positive for back pain. Negative for joint pain and neck pain.        Hip pains   Neurological: Positive for weakness and headaches. Negative for sensory change, speech change and focal weakness.   Psychiatric/Behavioral: Positive for memory loss.        Physical Exam  Temp:  [36.7 °C (98 °F)-36.9 °C (98.5 °F)] 36.9 °C (98.5 °F)  Pulse:  [67-89] 67  Resp:  [16] 16  BP: (113-137)/(60-70) 116/60  SpO2:  [92 %-96 %] 93 %    Physical Exam   Constitutional: She is oriented to person, place, and time. No distress.   HENT:   Head: Normocephalic and atraumatic.   Right cranio site dressed   Eyes: EOM are normal. Right eye exhibits no discharge. Left eye exhibits no discharge.   Neck: Neck supple.   Cardiovascular: Normal rate and regular rhythm.    No murmur heard.  Pulmonary/Chest: No stridor. She has no wheezes. She has no rales.   Abdominal: Soft. She exhibits no distension. There is no tenderness.   Musculoskeletal: She exhibits no edema or tenderness.   Neurological: She is alert and oriented to person, place, and time.   No  focal weakness  Mildly dysarthric    Skin: Skin is warm and dry. She is not diaphoretic. No pallor.   Vitals reviewed.      Fluids    Intake/Output Summary (Last 24 hours) at 07/04/19 0819  Last data filed at 07/04/19 0400   Gross per 24 hour   Intake              150 ml   Output                0 ml   Net              150 ml       Laboratory  Recent Labs      07/02/19 0310 07/03/19 0310 07/04/19   0441   WBC  5.5  6.4  6.9   RBC  3.80*  3.79*  3.61*   HEMOGLOBIN  10.8*  11.0*  10.6*   HEMATOCRIT  34.0*  33.0*  31.6*   MCV  89.5  87.1  87.5   MCH  28.4  29.0  29.4   MCHC  31.8*  33.3*  33.5*   RDW  55.2*  53.4*  53.8*   PLATELETCT  227  218  216   MPV  9.4  9.7  9.4     Recent Labs      07/02/19 0310 07/03/19 0310 07/04/19   0441   SODIUM  136  133*  133*   POTASSIUM  3.1*  3.6  3.1*   CHLORIDE  101  102  101   CO2  25  22  22   GLUCOSE  96  84  91   BUN  12  11  15   CREATININE  0.51  0.40*  0.57   CALCIUM  8.7  8.3*  8.3*                   Imaging  MR-LUMBAR SPINE-WITH & W/O   Final Result         1.  Grade 2-3 spondylolisthesis at the L5-S1 level with bilateral spondylolysis of the pars interarticularis. This causes severe bilateral neural foraminal stenosis at this level.      2.  Interval removal of posterior fusion hardware at the lumbosacral junction.      3.  Diffuse osteomyelitis involving the L5 vertebral body and the first 3 sacral segments.      4.  Chronic discitis at the L5-S1 level with anterior paraspinous abscess at this level and anterior to the S1 sacral segment.      5.  Smaller intraosseous bone bone abscesses or areas of necrosis noted in the sacrum.      6.  There is also evidence of osteomyelitis involving the jose antonio and sacrum bilaterally along the course of the previous sacral fixation screw. There is a large 4 cm abscess noted in the right gluteal soft tissues in a smaller 3 cm chronic appearing    abscess in the left gluteal musculature.      7.  There is a 1.5 cm intraosseous  abscess or area of necrosis in the right posterior ilium.      8.  There is a 3.3 cm centimeters multiloculated abscess anterior to the right sacroiliac joint.      MR-STEALTH BRAIN WITH & W/O   Final Result         1.  Innumerable subcentimeter brain metastases, many at the gray-white junction but also multiple noted throughout the basal ganglia and brainstem.      2.  Largest index lesion is noted in the right thalamus and has increased in size since previous exam and currently measures 9 mm and has a moderate amount of surrounding vasogenic edema.      US-EXTREMITY NON VASCULAR UNILATERAL RIGHT   Final Result      No right hip joint effusion. No soft tissue fluid collection.      MR-BRAIN-WITH & W/O   Final Result      1.  Innumerable supra and infratentorial enhancing nodules are again noted throughout the brain parenchyma with multiple lesions appearing somewhat larger and with increased enhancement. No associated diffusion restriction. Unchanged differential    considerations including bacterial or fungal infection versus metastatic disease.   2.  Mild diffuse cerebral substance loss.   3.  Mild microangiopathic ischemic change versus demyelination or gliosis.      CT-NEEDLE BX-DEEP BONE   Final Result      CT GUIDED RIGHT ILIUM DEEP BONE BIOPSY. SAMPLES WERE SENT TO MICROBIOLOGY FOR ANALYSIS.      CT-PELVIS WITH   Final Result         1.  No significant interval change in the size of the RIGHT iliac muscle fluid collections   2.  Hyperdense RIGHT gluteal lesion moderately suspicious for pseudoaneurysm with adjacent hematoma.   3.  Changes in the RIGHT iliac bone, BILATERAL sacrum and LEFT iliac bone suspicious for osteomyelitis   4.  Changes at L5-S1 suspicious for discitis osteomyelitis      CT-PELVIS WITH   Final Result      1.  Residual or recurrent abscess within the right iliacus muscle measuring 2.5 x 1.8 cm. It slightly smaller than on 5/28/2019      2.  Interval removal of hardware from the iliac  bones and sacrum      3.  Lytic change of the right iliac bone and the sacrum. This could be related to hardware versus osteomyelitis.      4.  Subacute fractures of the right ilium, sacrum, and there is lucency within the left iliac bone that is likely from hardware      MR-BRAIN-WITH & W/O   Final Result      1.  Interval progression of supra and infratentorial intra-axial nodules and associated edema. This short-term interval progression favors infection over neoplasm though both remain considerations.   2.  Mild atrophy      DX-PORTABLE FLUOROSCOPY < 1 HOUR   Final Result         Portable fluoroscopy utilized for 2 minutes.      DX-PELVIS-1 OR 2 VIEWS   Final Result      Status post hardware removal from the right SI joint      DX-CHEST-PORTABLE (1 VIEW)   Final Result      Interstitial prominence could be due to pulmonary edema or hypoventilatory change.      DX-CHEST-LIMITED (1 VIEW)   Final Result      LEFT basilar underinflation atelectasis or pneumonia      CT-DRAIN-HEMATOMA - SEROMA   Final Result      CT-guided RIGHT pelvic fluid collection aspiration, laboratory evaluation pending              Assessment/Plan  * Brain lesion- (present on admission)   Assessment & Plan    With right gluteal , multiloculated right iliac muscle fluid collections.   CHAS neg 3/15 and 5/24.  Cultures 3/29 and 4/4 neg. has completed multiple courses of IV antibiotics .  s/p drainage on 5/30/2019-cultures negative, S/p removal hardware 6/5-no cultures done, Panculture neg; 1, 3 beta glucan neg.  CT on 6/8 with residual abscess in the right iliacus muscle, 2.5 x 1.8 cm, slightly smaller than prior  s/p CT-guided deep bone biopsy 6/19/2019.  Cultures remain negative; path not reported   MRI 6/28 chronic discitis, diffuse OM L5, 3 and 4 cm abscesses right glute, 1.5 cm abscess or necrosis right psoterior ileum, 3.3 cm abscess right SI joint.  Ortho input is noted  Status post brain biopsy - 6-28-19   Brain biopsy  positive-necrotizing granulomas with histiocyte infiltration-may suggest mycobacterial infection.  Positive Q gold .  ? Fungal elements per prelim intra op path - continue with RIPE, B6, Ampho-- - monitor renal fxn, lfts, lytes, Cbc.   Correct hypomagnesemia , hypokalemia with IV Mag ,Kcl.  Continue Keppra for sz prophylaxis.   Discussed with ID - Dr. Garcia.                  Abscess of right iliac muscle and right gluteus medius muscle- (present on admission)   Assessment & Plan    Recurrent issue since sacral fracture repair in December 2018. Has had multiple IR drainage and antibiotics. Recent cultures remain unrevealing.    s/p biopsy of the nonhealing right iliac fracture area. Showed no AFB, organism, or fungal elements.  Cultures negative.  Brain biopsy + positive focal necrotizing granulomas-may suggest mycobacterial infection.  ? Fungal infxn- continue with  RI PE w B6 and amphotericin per ID.         Sepsis (HCC)   Assessment & Plan    This is sepsis (without associated acute organ dysfunction).    Sepsis resolved  Continue amphotericin , RIPE started.  Monitor vital signs     Hypomagnesemia- (present on admission)   Assessment & Plan    Magnesium decrease  Reviewed place with IV mag  Follow-up mag level       Hypercalcemia- (present on admission)   Assessment & Plan    Has resolved  Continue to monitor calcium     Acquired circulating anticoagulants (HCC)- (present on admission)   Assessment & Plan    Anticoagulation on hold given recent brain biopsy     Non-severe protein-calorie malnutrition (HCC)- (present on admission)   Assessment & Plan    Advance diet as tolerated  Dietary supplements       History of DVT (deep vein thrombosis)- (present on admission)   Assessment & Plan    Hold anticoagulation given recent brain biopsy and lumbar osteomyelitis  Restart anticoagulation once permitted by neurosurgery       Essential hypertension- (present on admission)   Assessment & Plan    Continue metoprolol  losartan and amlodipine  Monitor blood pressure     Chronic hyponatremia- (present on admission)   Assessment & Plan    Recurrent  Continue to monitor Na.     RLS (restless legs syndrome)- (present on admission)   Assessment & Plan    On Mirapex     Chronic pain- (present on admission)   Assessment & Plan    Continue pain management       History of breast cancer- (present on admission)   Assessment & Plan    S/p left mastectomy and breast implant.  Follow-up on intraoperative final pathology results     COPD (chronic obstructive pulmonary disease) (HCC)- (present on admission)   Assessment & Plan    Stable   RT protocol and oxygen          Discussed with multidisciplinary team plan of care.    VTE prophylaxis: SCDs    Discussed with multidisciplinary team plan of care.

## 2019-07-04 NOTE — THERAPY
"Physical Therapy Treatment completed.   Bed Mobility:  Supine to Sit: Moderate Assist  Transfers: Sit to Stand: Moderate Assist  Gait: Level Of Assist: Moderate Assist with Front-Wheel Walker       Plan of Care: Will benefit from Physical Therapy 3 times per week  Discharge Recommendations: Equipment: Will Continue to Assess for Equipment Needs. Post-acute therapy Recommend post-acute placement for continued physical therapy services prior to discharge home. Patient can tolerate post-acute therapies at a 5x/week frequency.       See \"Rehab Therapy-Acute\" Patient Summary Report for complete documentation.     Pt progressing slowly w/ therapy. She continues to be limited by hip pain although while in bed did not complain during rolling and ther ex. Pt was able to hold balance EOB w/ improved ease. She required less assist to stand. Pt very gaurded of L PHILIP in standing. Pt only able to ambulate a couple of feet. She became incontinent during every standing attempt. Pt continues to be at a level in which she will benefit from post acute therapy.  "

## 2019-07-04 NOTE — PROGRESS NOTES
Assumed pt care at 1900. Received bedside report .    Pt Alert and oriented x  4. Pt denies, chest pain, sob, nausea/vomiting, headache, blurry/double vision. Pt denies numbness/tingling. Pt indicating pain in hip and head, see MAR for interventions, rest, repositioning. POC discussed and education provided on administered medications. All questions and concerns addressed. Fall precautions seizure precautions, aspiration precautions, hourly rounding and Q4 hour neuro checks in place.

## 2019-07-04 NOTE — CARE PLAN
Problem: Communication  Goal: The ability to communicate needs accurately and effectively will improve  Outcome: PROGRESSING AS EXPECTED  Encourage use of the call light, encourage pt to voice feelings, assess pt needs hourly including pain and toileting, provide interpretative services as needed.       Problem: Safety  Goal: Will remain free from injury  Outcome: PROGRESSING AS EXPECTED  Pt educated on ambulation and fall risks, collaboration with PT/OT, all ambulation devices out of sight while not in use, proper signage hung, fall precautions in place.

## 2019-07-04 NOTE — PROGRESS NOTES
2 RN skin check    Crani site with dressing CDI,    Generalized bruising    Sacrum pink but blanching.    q2 turns and interventions in place.

## 2019-07-04 NOTE — PROGRESS NOTES
Infectious Disease Progress Note    Author: Karen Garcia M.D. Date & Time of service: 7/4/2019  12:30 PM    Chief Complaint:  Brain abscess, gluteal abscess  Vertebral OM    Interval History:  68 y.o. female admitted 5/29/2019 as a transfer from [facility] since 4/30/2019. + diabetes, SANTOSH of right hip with hardware, and breast cancer who was originally admitted to [facility] on 03/08/2019 for right hip and pelvic pain.  Work-up revealed a right iliopsoas lesion, gluteal abscess, and mult brain abscesses  6/1/2019-no fevers.  Continues to complain of significant pain in her hips.  Pain medications are being adjusted.  6/2/2019-no fevers.  Continues to remain off the antibiotics.  Awaiting Ortho evaluation  6/4/2019 patient febrile up to 103.  Having shaking chills.  Is not feeling well.  Denies any specific complaints.  6/10 AF WBC 5.3 somnolent No fever or new complaints  6/11 AF WBC 9 No fever or chills-some pain at surgical site. Limited participation with PT noted  6/12 AF WBC 7.3 somnolent but arousable-no new complaints  6/13 afebrile WBC 9.1 patient complaining of left hip pain.  Denies any headaches  6/14 afebrile, no CBC today.  Reports no new issues, tolerating antibiotics.  States the left hip pain is unchanged.  6/18 afebrile, WBC 6.9, HR 80s, -150s, on room air. Repeat CT on 6/17 showed no change in right iliac fluid collection, changes in right iliac/bilateral sacrum and left iliac suspicious of OM.   6/20- no fevers. C/o hip pain.   6/21/2019 no fevers.  Complains of some swelling at the right hip  6/23/2019-no fevers.  The swelling is improved.  The ultrasound did not show any abscess or fluid collection.  6/24/2019 no fevers.  The hip pain is better.  No cough no shortness of breath  6/25/2019-no fevers.  The hip cultures remain negative.  She is scheduled for stereotactic biopsy on Friday.  6/26/2019 no fevers are noted.  Patient complains of back pain.  In significant  distress.  2019-no fevers.  Continues to complain of pain.  No new issues overnight   AF WBC 6.6 planned for MRIs and biopsy today-somnolent   AF WBC 7.7 obtunded prelim path showed fungus on brain specimen   AF somnolent-not awakening to voice-slurred speech noted by Neurosurgery   AF more awake today; not oriented-ate about half of lunch c/o pain in joints   AF WBC 5.5 no new complaints  7/3 AF still c/o unrelenting right hip pain whenever awake-Falls asleep during exam   AF much more alert and conversant today-c/o left hip pain, unrelenting and would like parietal dressing changed. HA at surgical site.  Denies SE abx. No new neurodeficits  Labs Reviewed, Medications Reviewed, and Wound Reviewed.    Review of Systems:  Review of Systems   Constitutional: Negative for chills and fever.   Respiratory: Negative for cough.    Cardiovascular: Negative for chest pain.   Gastrointestinal: Negative for nausea and vomiting.   Musculoskeletal: Positive for joint pain and myalgias.   Skin: Negative for rash.   Neurological: Positive for headaches.   All other systems reviewed and are negative.      Hemodynamics:  Temp (24hrs), Av.8 °C (98.3 °F), Min:36.7 °C (98 °F), Max:36.9 °C (98.5 °F)  Temperature: 36.9 °C (98.5 °F)  Pulse  Av.1  Min: 54  Max: 125   Blood Pressure : 116/60       Physical Exam:  Physical Exam   Constitutional: She is oriented to person, place, and time. No distress.   Thin   HENT:   Mouth/Throat: No oropharyngeal exudate.   Right parietal surgical site dressed no drainage   Eyes: Pupils are equal, round, and reactive to light. EOM are normal. No scleral icterus.   Neck: Neck supple. No JVD present.   Cardiovascular: Normal rate and regular rhythm.    No murmur heard.  Pulmonary/Chest: Effort normal. No stridor. No respiratory distress. She has no wheezes. She has no rales.   Abdominal: Soft. She exhibits no distension. There is no tenderness. There is no rebound.    Musculoskeletal: She exhibits tenderness. She exhibits no edema.   Lymphadenopathy:     She has no cervical adenopathy.   Neurological: She is alert and oriented to person, place, and time. No cranial nerve deficit. She exhibits normal muscle tone.   Skin: Skin is warm. No rash noted. She is not diaphoretic. No erythema.   Nursing note and vitals reviewed.      Meds:    Current Facility-Administered Medications:   •  magnesium chloride  •  potassium chloride SA  •  [START ON 7/5/2019] pyridoxine  •  levETIRAcetam  •  isoniazid  •  riFAMPin  •  ethambutol  •  pyrazinamide  •  potassium chloride SA  •  acetaminophen  •  Pharmacy Consult Request  •  labetalol  •  hydrALAZINE  •  docusate sodium  •  senna-docusate  •  Pharmacy  •  NS **AND** acetaminophen **AND** diphenhydrAMINE **AND** D5W **AND** amphotericin b liposomal (AMBISOME) IV **AND** D5W **AND** NS  •  NS  •  oxyCODONE CR  •  gabapentin  •  morphine injection  •  lactobacillus rhamnosus  •  oxyCODONE immediate-release  •  sucralfate  •  acetaminophen  •  pramipexole  •  lidocaine  •  temazepam  •  cyclobenzaprine  •  Respiratory Care per Protocol  •  amLODIPine  •  cinacalcet  •  losartan  •  atorvastatin  •  omeprazole  •  metoprolol  •  ondansetron  •  ondansetron    Labs:  Recent Labs      07/02/19 0310 07/03/19 0310 07/04/19 0441   WBC  5.5  6.4  6.9   RBC  3.80*  3.79*  3.61*   HEMOGLOBIN  10.8*  11.0*  10.6*   HEMATOCRIT  34.0*  33.0*  31.6*   MCV  89.5  87.1  87.5   MCH  28.4  29.0  29.4   RDW  55.2*  53.4*  53.8*   PLATELETCT  227  218  216   MPV  9.4  9.7  9.4   NEUTSPOLYS   --    --   65.30   LYMPHOCYTES   --    --   17.30*   MONOCYTES   --    --   11.30   EOSINOPHILS   --    --   5.10   BASOPHILS   --    --   0.60     Recent Labs      07/02/19 0310 07/03/19 0310 07/04/19   0441   SODIUM  136  133*  133*   POTASSIUM  3.1*  3.6  3.1*   CHLORIDE  101  102  101   CO2  25  22  22   GLUCOSE  96  84  91   BUN  12  11  15     Recent Labs       07/02/19   0310  07/03/19   0310  07/04/19   0441   ALBUMIN   --    --   3.3   TBILIRUBIN   --    --   0.5   ALKPHOSPHAT   --    --   408*   TOTPROTEIN   --    --   6.8   ALTSGPT   --    --   14   ASTSGOT   --    --   31   CREATININE  0.51  0.40*  0.57       Imaging:  MRI of the brain on 6/8/2019  Impression       1.  Interval progression of supra and infratentorial intra-axial nodules and associated edema. This short-term interval progression favors infection over neoplasm though both remain considerations.  2.  Mild atrophy         Micro:  Results     Procedure Component Value Units Date/Time    Cryptococcal Antigen Titer [[id number]]     Order Status:  Sent Specimen:  Blood from Blood     Anaerobic Culture [[id number]] Collected:  06/28/19 1403    Order Status:  Completed Specimen:  Tissue Updated:  07/03/19 1347     Significant Indicator NEG     Source TISS     Site Right Parietal Lesion     Culture Result No Anaerobes isolated.    Narrative:       Surgery Specimen    AFB Culture [[id number]] Collected:  06/28/19 1403    Order Status:  Completed Specimen:  Tissue Updated:  07/03/19 1347     Significant Indicator NEG     Source TISS     Site Right Parietal Lesion     Culture Result Culture in progress.     AFB Smear Results No acid fast bacilli seen.    Narrative:       Surgery Specimen    Fungal Culture [[id number]] Collected:  06/28/19 1403    Order Status:  Completed Specimen:  Tissue Updated:  07/03/19 1347     Significant Indicator NEG     Source TISS     Site Right Parietal Lesion     Culture Result No fungal growth to date.    Narrative:       Surgery Specimen    CULTURE TISSUE W/ GRM STAIN [[id number]] Collected:  06/28/19 1403    Order Status:  Completed Specimen:  Tissue Updated:  07/03/19 1347     Significant Indicator NEG     Source TISS     Site Right Parietal Lesion     Culture Result No growth at 72 hours.     Gram Stain Result Rare WBCs.  No organisms seen.      Narrative:       Surgery Specimen     "GRAM STAIN [[id number]] Collected:  06/28/19 1403    Order Status:  Completed Specimen:  Tissue Updated:  06/29/19 2315     Significant Indicator .     Source TISS     Site Right Parietal Lesion     Gram Stain Result Rare WBCs.  No organisms seen.      Narrative:       Surgery Specimen    Acid Fast Stain [[id number]] Collected:  06/28/19 1403    Order Status:  Completed Specimen:  Tissue Updated:  06/29/19 2315     Significant Indicator NEG     Source TISS     Site Right Parietal Lesion     AFB Smear Results No acid fast bacilli seen.    Narrative:       Surgery Specimen    Cryptococcal Antigen Titer [[id number]] Collected:  06/28/19 0000    Order Status:  Canceled Specimen:  Other from Blood           Assessment:  Gluteal and iliopsoas abscess  Brain abscesses vs mets  Breast cancer  DM2  +QuantiGold    Plan:  Brain abscesses   Query CNS fungal infection vs other  MRI 4/23 \"supra and infratentorial brain parenchyma. Decrease in the size of the lesions. Interval reduction in the extent of the surrounding white matter edema consistent with improvement/treatment response of the most of the multifocal brain abscesses.  MRI 5/21 showed innumerable microabscesses vs mets  Abx (vancomycin, cefepime and Flagyl) discontinued on 5/23 after ~8 weeks of treatment-developed new fevers on 6/4  MRI on 6/8 with interval progression of supra and infratentorial intra--axial nodules and associated edema.  New right thalamus lesion. Radiology favors infection over neoplasm though both remain considerations (had been off abx)  Mult blood cultures neg  CHAS neg 3/15 and 5/24  MRI 6/22 worsening CNS lesions  s/p biopsy- fungal appearing elements seen per Neurosurgery. +necrotizing granulomas. All stains negative in EPIC  Continue liposomal Ampho B pending final cultures and serologies  Check electrolytes daily and replace as needed  Repeat cocci and crypto ordered    Gluteal and iliopsoas abscess   OM L5, S1-3, new this admission  Recurrent " "abscesses, additional work  Adjacent to hardware in right hip (placed 12/17/18)  CT 4/23 \"Fluid collections within the right gluteal and iliacis muscles.. The collection within the right gluteal muscle has unchanged while the fluid collection within the right iliacis muscle is increased somewhat in size.\" 2.7 cm  Cultures 3/29 and 4/4 neg  MRI on 5/20/2019 - interval decrease in collection in R gluteal muscle and persistent multiloculated collection in R iliacus muscle.   CT 5/28 no change  s/p drainage on 5/30/2019-cultures negative  S/p removal hardware 6/5-no cultures done  1, 3 beta glucan neg  CT on 6/8 with residual abscess in the right iliacus muscle, 2.5 x 1.8 cm, slightly smaller than prior  s/p CT-guided deep bone biopsy 6/19/2019.  Cultures remain negative; path not reported   MRI 6/28 chronic discitis, diffuse OM L5, 3 and 4 cm abscesses right glute, 1.5 cm abscess or necrosis right posterior ileum, 3.3 cm abscess right SI joint  Ampho B as above and TB therapy  Drain if feasible-repeat all cultures of fluid    +QuantiGold  Query disseminated TB  Path with necrotizing granulomas  AFB stain neg  AFB cultures pending  All prior AFB cxs still neg  Started RIPE +B6 7/3  Repeat MRI 4 weeks to assess for improvement    Type 2 DM  Hemoglobin A1c 6.3   Keep BS under 150 to help control current infection      DW IM Dr Esteban  .                          "

## 2019-07-05 LAB
ALBUMIN SERPL BCP-MCNC: 3.1 G/DL (ref 3.2–4.9)
ALBUMIN/GLOB SERPL: 1 G/DL
ALP SERPL-CCNC: 450 U/L (ref 30–99)
ALT SERPL-CCNC: 17 U/L (ref 2–50)
ANION GAP SERPL CALC-SCNC: 10 MMOL/L (ref 0–11.9)
AST SERPL-CCNC: 37 U/L (ref 12–45)
BILIRUB SERPL-MCNC: 0.4 MG/DL (ref 0.1–1.5)
BUN SERPL-MCNC: 13 MG/DL (ref 8–22)
CALCIUM SERPL-MCNC: 8.2 MG/DL (ref 8.5–10.5)
CHLORIDE SERPL-SCNC: 106 MMOL/L (ref 96–112)
CO2 SERPL-SCNC: 20 MMOL/L (ref 20–33)
CREAT SERPL-MCNC: 0.46 MG/DL (ref 0.5–1.4)
ERYTHROCYTE [DISTWIDTH] IN BLOOD BY AUTOMATED COUNT: 54.6 FL (ref 35.9–50)
GLOBULIN SER CALC-MCNC: 3 G/DL (ref 1.9–3.5)
GLUCOSE SERPL-MCNC: 106 MG/DL (ref 65–99)
HCT VFR BLD AUTO: 29.9 % (ref 37–47)
HGB BLD-MCNC: 9.7 G/DL (ref 12–16)
MAGNESIUM SERPL-MCNC: 1.3 MG/DL (ref 1.5–2.5)
MCH RBC QN AUTO: 29 PG (ref 27–33)
MCHC RBC AUTO-ENTMCNC: 32.4 G/DL (ref 33.6–35)
MCV RBC AUTO: 89.5 FL (ref 81.4–97.8)
PLATELET # BLD AUTO: 197 K/UL (ref 164–446)
PMV BLD AUTO: 9.8 FL (ref 9–12.9)
POTASSIUM SERPL-SCNC: 3.4 MMOL/L (ref 3.6–5.5)
PROT SERPL-MCNC: 6.1 G/DL (ref 6–8.2)
RBC # BLD AUTO: 3.34 M/UL (ref 4.2–5.4)
SODIUM SERPL-SCNC: 136 MMOL/L (ref 135–145)
WBC # BLD AUTO: 5.4 K/UL (ref 4.8–10.8)

## 2019-07-05 PROCEDURE — A9270 NON-COVERED ITEM OR SERVICE: HCPCS | Performed by: FAMILY MEDICINE

## 2019-07-05 PROCEDURE — 85027 COMPLETE CBC AUTOMATED: CPT

## 2019-07-05 PROCEDURE — 700101 HCHG RX REV CODE 250: Performed by: FAMILY MEDICINE

## 2019-07-05 PROCEDURE — 99232 SBSQ HOSP IP/OBS MODERATE 35: CPT | Performed by: INTERNAL MEDICINE

## 2019-07-05 PROCEDURE — 97535 SELF CARE MNGMENT TRAINING: CPT

## 2019-07-05 PROCEDURE — 97110 THERAPEUTIC EXERCISES: CPT

## 2019-07-05 PROCEDURE — 700105 HCHG RX REV CODE 258: Performed by: FAMILY MEDICINE

## 2019-07-05 PROCEDURE — 770006 HCHG ROOM/CARE - MED/SURG/GYN SEMI*

## 2019-07-05 PROCEDURE — 83735 ASSAY OF MAGNESIUM: CPT

## 2019-07-05 PROCEDURE — 700102 HCHG RX REV CODE 250 W/ 637 OVERRIDE(OP): Performed by: FAMILY MEDICINE

## 2019-07-05 PROCEDURE — A9270 NON-COVERED ITEM OR SERVICE: HCPCS | Performed by: HOSPITALIST

## 2019-07-05 PROCEDURE — 97530 THERAPEUTIC ACTIVITIES: CPT

## 2019-07-05 PROCEDURE — 97116 GAIT TRAINING THERAPY: CPT

## 2019-07-05 PROCEDURE — 80053 COMPREHEN METABOLIC PANEL: CPT

## 2019-07-05 PROCEDURE — 700111 HCHG RX REV CODE 636 W/ 250 OVERRIDE (IP): Performed by: FAMILY MEDICINE

## 2019-07-05 PROCEDURE — 700112 HCHG RX REV CODE 229: Performed by: NURSE PRACTITIONER

## 2019-07-05 PROCEDURE — 302146: Performed by: HOSPITALIST

## 2019-07-05 PROCEDURE — 700111 HCHG RX REV CODE 636 W/ 250 OVERRIDE (IP): Performed by: HOSPITALIST

## 2019-07-05 PROCEDURE — 700102 HCHG RX REV CODE 250 W/ 637 OVERRIDE(OP): Performed by: INTERNAL MEDICINE

## 2019-07-05 PROCEDURE — 700111 HCHG RX REV CODE 636 W/ 250 OVERRIDE (IP): Mod: JG | Performed by: FAMILY MEDICINE

## 2019-07-05 PROCEDURE — 700102 HCHG RX REV CODE 250 W/ 637 OVERRIDE(OP): Performed by: HOSPITALIST

## 2019-07-05 PROCEDURE — A9270 NON-COVERED ITEM OR SERVICE: HCPCS | Performed by: NURSE PRACTITIONER

## 2019-07-05 PROCEDURE — 36415 COLL VENOUS BLD VENIPUNCTURE: CPT

## 2019-07-05 PROCEDURE — A9270 NON-COVERED ITEM OR SERVICE: HCPCS | Performed by: INTERNAL MEDICINE

## 2019-07-05 PROCEDURE — 99232 SBSQ HOSP IP/OBS MODERATE 35: CPT | Performed by: HOSPITALIST

## 2019-07-05 RX ORDER — POTASSIUM CHLORIDE 20 MEQ/1
40 TABLET, EXTENDED RELEASE ORAL DAILY
Status: DISCONTINUED | OUTPATIENT
Start: 2019-07-06 | End: 2019-07-06

## 2019-07-05 RX ORDER — POTASSIUM CHLORIDE 20 MEQ/1
40 TABLET, EXTENDED RELEASE ORAL ONCE
Status: COMPLETED | OUTPATIENT
Start: 2019-07-05 | End: 2019-07-05

## 2019-07-05 RX ORDER — MAGNESIUM SULFATE HEPTAHYDRATE 40 MG/ML
4 INJECTION, SOLUTION INTRAVENOUS ONCE
Status: COMPLETED | OUTPATIENT
Start: 2019-07-05 | End: 2019-07-05

## 2019-07-05 RX ADMIN — POTASSIUM CHLORIDE 20 MEQ: 20 TABLET, EXTENDED RELEASE ORAL at 05:50

## 2019-07-05 RX ADMIN — PRAMIPEXOLE DIHYDROCHLORIDE 1 MG: 0.5 TABLET ORAL at 05:49

## 2019-07-05 RX ADMIN — MORPHINE SULFATE 2 MG: 4 INJECTION INTRAVENOUS at 03:04

## 2019-07-05 RX ADMIN — ETHAMBUTOL HYDROCHLORIDE 800 MG: 400 TABLET, FILM COATED ORAL at 05:49

## 2019-07-05 RX ADMIN — ATORVASTATIN CALCIUM 10 MG: 10 TABLET, FILM COATED ORAL at 17:43

## 2019-07-05 RX ADMIN — GABAPENTIN 100 MG: 100 CAPSULE ORAL at 17:43

## 2019-07-05 RX ADMIN — METOPROLOL TARTRATE 25 MG: 25 TABLET, FILM COATED ORAL at 05:48

## 2019-07-05 RX ADMIN — PYRAZINAMIDE 1000 MG: 500 TABLET ORAL at 05:49

## 2019-07-05 RX ADMIN — DIPHENHYDRAMINE HCL 25 MG: 25 TABLET ORAL at 16:53

## 2019-07-05 RX ADMIN — MAGNESIUM 64 MG (MAGNESIUM CHLORIDE) TABLET,DELAYED RELEASE 128 MG: at 17:42

## 2019-07-05 RX ADMIN — SODIUM CHLORIDE 250 ML: 9 INJECTION, SOLUTION INTRAVENOUS at 20:21

## 2019-07-05 RX ADMIN — POTASSIUM CHLORIDE 40 MEQ: 20 TABLET, EXTENDED RELEASE ORAL at 14:57

## 2019-07-05 RX ADMIN — AMLODIPINE BESYLATE 10 MG: 5 TABLET ORAL at 05:49

## 2019-07-05 RX ADMIN — GABAPENTIN 100 MG: 100 CAPSULE ORAL at 05:49

## 2019-07-05 RX ADMIN — CINACALCET HYDROCHLORIDE 60 MG: 30 TABLET, COATED ORAL at 05:48

## 2019-07-05 RX ADMIN — MAGNESIUM SULFATE IN WATER 4 G: 40 INJECTION, SOLUTION INTRAVENOUS at 14:57

## 2019-07-05 RX ADMIN — OMEPRAZOLE 20 MG: 20 CAPSULE, DELAYED RELEASE ORAL at 05:49

## 2019-07-05 RX ADMIN — PRAMIPEXOLE DIHYDROCHLORIDE 1 MG: 0.5 TABLET ORAL at 17:42

## 2019-07-05 RX ADMIN — AMPHOTERICIN B 285.5 MG: 50 INJECTION, POWDER, LYOPHILIZED, FOR SOLUTION INTRAVENOUS at 17:53

## 2019-07-05 RX ADMIN — Medication 1 CAPSULE: at 08:20

## 2019-07-05 RX ADMIN — ACETAMINOPHEN 1000 MG: 500 TABLET ORAL at 05:49

## 2019-07-05 RX ADMIN — DEXTROSE MONOHYDRATE 30 ML: 50 INJECTION, SOLUTION INTRAVENOUS at 20:29

## 2019-07-05 RX ADMIN — RIFAMPIN 300 MG: 300 CAPSULE ORAL at 17:42

## 2019-07-05 RX ADMIN — ACETAMINOPHEN 1000 MG: 500 TABLET ORAL at 17:43

## 2019-07-05 RX ADMIN — LEVETIRACETAM 500 MG: 500 TABLET, FILM COATED ORAL at 17:43

## 2019-07-05 RX ADMIN — LEVETIRACETAM 500 MG: 500 TABLET, FILM COATED ORAL at 05:50

## 2019-07-05 RX ADMIN — MAGNESIUM 64 MG (MAGNESIUM CHLORIDE) TABLET,DELAYED RELEASE 128 MG: at 05:48

## 2019-07-05 RX ADMIN — PRAMIPEXOLE DIHYDROCHLORIDE 1 MG: 0.5 TABLET ORAL at 11:32

## 2019-07-05 RX ADMIN — ACETAMINOPHEN 500 MG: 500 TABLET ORAL at 01:01

## 2019-07-05 RX ADMIN — SODIUM CHLORIDE: 9 INJECTION, SOLUTION INTRAVENOUS at 05:53

## 2019-07-05 RX ADMIN — RIFAMPIN 300 MG: 300 CAPSULE ORAL at 05:49

## 2019-07-05 RX ADMIN — OXYCODONE HYDROCHLORIDE 10 MG: 5 TABLET ORAL at 21:50

## 2019-07-05 RX ADMIN — SODIUM CHLORIDE 500 ML: 9 INJECTION, SOLUTION INTRAVENOUS at 16:24

## 2019-07-05 RX ADMIN — GABAPENTIN 100 MG: 100 CAPSULE ORAL at 11:32

## 2019-07-05 RX ADMIN — OXYCODONE HYDROCHLORIDE 10 MG: 10 TABLET, FILM COATED, EXTENDED RELEASE ORAL at 17:42

## 2019-07-05 RX ADMIN — OXYCODONE HYDROCHLORIDE 10 MG: 5 TABLET ORAL at 15:06

## 2019-07-05 RX ADMIN — OXYCODONE HYDROCHLORIDE 10 MG: 5 TABLET ORAL at 08:20

## 2019-07-05 RX ADMIN — OXYCODONE HYDROCHLORIDE 10 MG: 10 TABLET, FILM COATED, EXTENDED RELEASE ORAL at 05:51

## 2019-07-05 RX ADMIN — SUCRALFATE 1 G: 1 SUSPENSION ORAL at 05:48

## 2019-07-05 RX ADMIN — SUCRALFATE 1 G: 1 SUSPENSION ORAL at 11:32

## 2019-07-05 RX ADMIN — SUCRALFATE 1 G: 1 SUSPENSION ORAL at 17:42

## 2019-07-05 RX ADMIN — CYCLOBENZAPRINE 10 MG: 10 TABLET, FILM COATED ORAL at 11:31

## 2019-07-05 RX ADMIN — LIDOCAINE 2 PATCH: 50 PATCH CUTANEOUS at 11:32

## 2019-07-05 RX ADMIN — LOSARTAN POTASSIUM 50 MG: 50 TABLET ORAL at 05:49

## 2019-07-05 RX ADMIN — PYRIDOXINE HCL TAB 50 MG 100 MG: 50 TAB at 05:48

## 2019-07-05 RX ADMIN — DOCUSATE SODIUM 100 MG: 100 CAPSULE, LIQUID FILLED ORAL at 05:49

## 2019-07-05 RX ADMIN — SUCRALFATE 1 G: 1 SUSPENSION ORAL at 01:01

## 2019-07-05 RX ADMIN — METOPROLOL TARTRATE 25 MG: 25 TABLET, FILM COATED ORAL at 17:43

## 2019-07-05 RX ADMIN — ACETAMINOPHEN 650 MG: 325 TABLET, FILM COATED ORAL at 16:54

## 2019-07-05 RX ADMIN — ISONIAZID 300 MG: 300 TABLET ORAL at 05:49

## 2019-07-05 RX ADMIN — DEXTROSE MONOHYDRATE 30 ML: 50 INJECTION, SOLUTION INTRAVENOUS at 17:28

## 2019-07-05 ASSESSMENT — COGNITIVE AND FUNCTIONAL STATUS - GENERAL
EATING MEALS: A LITTLE
TURNING FROM BACK TO SIDE WHILE IN FLAT BAD: UNABLE
SUGGESTED CMS G CODE MODIFIER MOBILITY: CL
CLIMB 3 TO 5 STEPS WITH RAILING: TOTAL
HELP NEEDED FOR BATHING: A LOT
DAILY ACTIVITIY SCORE: 12
MOVING TO AND FROM BED TO CHAIR: UNABLE
STANDING UP FROM CHAIR USING ARMS: A LITTLE
TOILETING: TOTAL
MOBILITY SCORE: 10
PERSONAL GROOMING: A LOT
DRESSING REGULAR LOWER BODY CLOTHING: A LOT
SUGGESTED CMS G CODE MODIFIER DAILY ACTIVITY: CL
DRESSING REGULAR UPPER BODY CLOTHING: A LOT
MOVING FROM LYING ON BACK TO SITTING ON SIDE OF FLAT BED: UNABLE
WALKING IN HOSPITAL ROOM: A LITTLE

## 2019-07-05 ASSESSMENT — ENCOUNTER SYMPTOMS
COUGH: 0
NAUSEA: 0
MEMORY LOSS: 1
SENSORY CHANGE: 0
BACK PAIN: 1
HEADACHES: 0
DIAPHORESIS: 0
WHEEZING: 0
FLANK PAIN: 0
MYALGIAS: 1
WEAKNESS: 1
FOCAL WEAKNESS: 0
VOMITING: 0
SHORTNESS OF BREATH: 0
CHILLS: 0
NECK PAIN: 0
DIZZINESS: 0
FEVER: 0
DIARRHEA: 0
SPEECH CHANGE: 0
ABDOMINAL PAIN: 0
PALPITATIONS: 0

## 2019-07-05 ASSESSMENT — GAIT ASSESSMENTS
ASSISTIVE DEVICE: FRONT WHEEL WALKER
DEVIATION: ANTALGIC;STEP TO;BRADYKINETIC;SHUFFLED GAIT
GAIT LEVEL OF ASSIST: MINIMAL ASSIST
DISTANCE (FEET): 10

## 2019-07-05 NOTE — CARE PLAN
Problem: Safety  Goal: Will remain free from injury  Outcome: PROGRESSING AS EXPECTED  Bed alarm on, clutter free environment    Problem: Mobility  Goal: Risk for activity intolerance will decrease  Outcome: PROGRESSING SLOWER THAN EXPECTED  PT/OT working with pt to improve, refusing mobilization often

## 2019-07-05 NOTE — PROGRESS NOTES
Hospital Medicine Daily Progress Note    Date of Service  7/5/2019    Chief Complaint    68 y.o. female admitted 5/29/2019 with pelvic pain and confusion .     Hospital Course      68 y.o. female transferred from [facility] on 5/29/2019 with persistent right gluteal abscesses since sacral fracture repair in December with multiple IR drainages, MRI brain showed increase in brain lesions.  Her brain lesions are known from prior imaging and there is concern they represent metastasis. Oncology aware and do not recommend further imaging.  CHAS done by Dr. Potter showed no vegetations.  Patient had completed IV Vancomycin, Cefepime and flagyl approx 8 week duration around 5-23-19 .  She has had multiple IR drainages.  Cultures remained negative.  She underwent Pelvic abscess drainage on 5/30 with negative results.  On 6/4, she became febrile again, so restarted on cefepime and vancomycin per ID.  Right hip hardware was removed on 6/5.  Repeat MRI brain on 6/7/19 saw progression of the supra and infratentorial intra-axial nodules with edema, prompting a Neurosurgery consult. Dr. Nguyen [facility] favors infectious etiology, recommended continued antibiotics.   Cefepime + vanc were continued.  Work up was broadened to include fungal etiology, and she is started on empiric started fluconazole.  Biopsy of the hip showed no AFB, organism, or fungal elements.  Repeat CT on 6/17 showed no significant interval change in the size of the right iliacus muscle fluid collections, with changes in the right iliac bone, bilateral sacrum, and left iliac bone suspicious for osteomyelitis, and changes at L5-S1 suspicious for discitis/osteomyelitis, along with hyperdense right gluteal lesion, moderately suspicious for postoperative pseudoaneurysm with adjacent hematoma.   Biopsy of the hip showed no AFB, organism, or fungal elements.  Patient had re evaluation by Dr. Nguyen and she underwent right-sided craniotomy with resection of right sided superficial  mass on 6/28/2019.  Felt likely fungal infection on preliminary findings-patient started on IV Amphotericin.       Interval Problem Update    Improved function-has been up with walker, limited by hip pain.  Afebrile on antibiotics.  Amphotericin electrolyte wasting-plan to correct  low magnesium and potassium.     Consultants/Specialty  Neurosurgery, Dr. Nguyen  Pulmonary Dr. Salinas      Code Status    DNR/DNI    Disposition    Plan continue physical therapy, possible skilled nursing facility placement    Review of Systems  Review of Systems   Constitutional: Negative for chills, diaphoresis and fever.   Respiratory: Negative for cough, shortness of breath and wheezing.    Cardiovascular: Negative for chest pain and palpitations.   Gastrointestinal: Negative for abdominal pain, diarrhea and vomiting.   Genitourinary: Negative for flank pain and hematuria.   Musculoskeletal: Positive for back pain. Negative for joint pain and neck pain.        Hip pains   Neurological: Positive for weakness. Negative for dizziness, sensory change, speech change, focal weakness and headaches.   Psychiatric/Behavioral: Positive for memory loss.        Physical Exam  Temp:  [36.5 °C (97.7 °F)-36.7 °C (98.1 °F)] 36.6 °C (97.9 °F)  Pulse:  [61-90] 71  Resp:  [16-20] 16  BP: (112-141)/(57-83) 141/83  SpO2:  [93 %-97 %] 97 %    Physical Exam   Constitutional: She is oriented to person, place, and time. No distress.   HENT:   Head: Normocephalic and atraumatic.   Right cranio site dressed   Eyes: EOM are normal. Right eye exhibits no discharge. Left eye exhibits no discharge. No scleral icterus.   Neck: Neck supple.   Cardiovascular: Normal rate and regular rhythm.    No murmur heard.  Pulmonary/Chest: No stridor. She has no wheezes. She has no rales.   Abdominal: Soft. She exhibits no distension. There is no tenderness.   Musculoskeletal: She exhibits no edema or tenderness.   Neurological: She is alert and oriented to person, place, and  time.   No focal weakness     Skin: Skin is warm and dry. She is not diaphoretic. No pallor.   Psychiatric: She has a normal mood and affect. Her behavior is normal.   Vitals reviewed.      Fluids    Intake/Output Summary (Last 24 hours) at 07/05/19 1619  Last data filed at 07/05/19 1259   Gross per 24 hour   Intake              240 ml   Output                1 ml   Net              239 ml       Laboratory  Recent Labs      07/03/19 0310 07/04/19 0441 07/05/19   0321   WBC  6.4  6.9  5.4   RBC  3.79*  3.61*  3.34*   HEMOGLOBIN  11.0*  10.6*  9.7*   HEMATOCRIT  33.0*  31.6*  29.9*   MCV  87.1  87.5  89.5   MCH  29.0  29.4  29.0   MCHC  33.3*  33.5*  32.4*   RDW  53.4*  53.8*  54.6*   PLATELETCT  218  216  197   MPV  9.7  9.4  9.8     Recent Labs      07/03/19 0310 07/04/19 0441 07/05/19   0321   SODIUM  133*  133*  136   POTASSIUM  3.6  3.1*  3.4*   CHLORIDE  102  101  106   CO2  22  22  20   GLUCOSE  84  91  106*   BUN  11  15  13   CREATININE  0.40*  0.57  0.46*   CALCIUM  8.3*  8.3*  8.2*                   Imaging  MR-LUMBAR SPINE-WITH & W/O   Final Result         1.  Grade 2-3 spondylolisthesis at the L5-S1 level with bilateral spondylolysis of the pars interarticularis. This causes severe bilateral neural foraminal stenosis at this level.      2.  Interval removal of posterior fusion hardware at the lumbosacral junction.      3.  Diffuse osteomyelitis involving the L5 vertebral body and the first 3 sacral segments.      4.  Chronic discitis at the L5-S1 level with anterior paraspinous abscess at this level and anterior to the S1 sacral segment.      5.  Smaller intraosseous bone bone abscesses or areas of necrosis noted in the sacrum.      6.  There is also evidence of osteomyelitis involving the jose antonio and sacrum bilaterally along the course of the previous sacral fixation screw. There is a large 4 cm abscess noted in the right gluteal soft tissues in a smaller 3 cm chronic appearing    abscess in the  left gluteal musculature.      7.  There is a 1.5 cm intraosseous abscess or area of necrosis in the right posterior ilium.      8.  There is a 3.3 cm centimeters multiloculated abscess anterior to the right sacroiliac joint.      MR-STEALTH BRAIN WITH & W/O   Final Result         1.  Innumerable subcentimeter brain metastases, many at the gray-white junction but also multiple noted throughout the basal ganglia and brainstem.      2.  Largest index lesion is noted in the right thalamus and has increased in size since previous exam and currently measures 9 mm and has a moderate amount of surrounding vasogenic edema.      US-EXTREMITY NON VASCULAR UNILATERAL RIGHT   Final Result      No right hip joint effusion. No soft tissue fluid collection.      MR-BRAIN-WITH & W/O   Final Result      1.  Innumerable supra and infratentorial enhancing nodules are again noted throughout the brain parenchyma with multiple lesions appearing somewhat larger and with increased enhancement. No associated diffusion restriction. Unchanged differential    considerations including bacterial or fungal infection versus metastatic disease.   2.  Mild diffuse cerebral substance loss.   3.  Mild microangiopathic ischemic change versus demyelination or gliosis.      CT-NEEDLE BX-DEEP BONE   Final Result      CT GUIDED RIGHT ILIUM DEEP BONE BIOPSY. SAMPLES WERE SENT TO MICROBIOLOGY FOR ANALYSIS.      CT-PELVIS WITH   Final Result         1.  No significant interval change in the size of the RIGHT iliac muscle fluid collections   2.  Hyperdense RIGHT gluteal lesion moderately suspicious for pseudoaneurysm with adjacent hematoma.   3.  Changes in the RIGHT iliac bone, BILATERAL sacrum and LEFT iliac bone suspicious for osteomyelitis   4.  Changes at L5-S1 suspicious for discitis osteomyelitis      CT-PELVIS WITH   Final Result      1.  Residual or recurrent abscess within the right iliacus muscle measuring 2.5 x 1.8 cm. It slightly smaller than on  5/28/2019      2.  Interval removal of hardware from the iliac bones and sacrum      3.  Lytic change of the right iliac bone and the sacrum. This could be related to hardware versus osteomyelitis.      4.  Subacute fractures of the right ilium, sacrum, and there is lucency within the left iliac bone that is likely from hardware      MR-BRAIN-WITH & W/O   Final Result      1.  Interval progression of supra and infratentorial intra-axial nodules and associated edema. This short-term interval progression favors infection over neoplasm though both remain considerations.   2.  Mild atrophy      DX-PORTABLE FLUOROSCOPY < 1 HOUR   Final Result         Portable fluoroscopy utilized for 2 minutes.      DX-PELVIS-1 OR 2 VIEWS   Final Result      Status post hardware removal from the right SI joint      DX-CHEST-PORTABLE (1 VIEW)   Final Result      Interstitial prominence could be due to pulmonary edema or hypoventilatory change.      DX-CHEST-LIMITED (1 VIEW)   Final Result      LEFT basilar underinflation atelectasis or pneumonia      CT-DRAIN-HEMATOMA - SEROMA   Final Result      CT-guided RIGHT pelvic fluid collection aspiration, laboratory evaluation pending         IR-US GUIDED PIV    (Results Pending)        Assessment/Plan  * Brain lesion- (present on admission)   Assessment & Plan    With right gluteal , multiloculated right iliac muscle fluid collections.   CHAS neg 3/15 and 5/24.  Cultures 3/29 and 4/4 neg. has completed multiple courses of IV antibiotics .  s/p drainage on 5/30/2019-cultures negative, S/p removal hardware 6/5-no cultures done, Panculture neg; 1, 3 beta glucan neg.  CT on 6/8 with residual abscess in the right iliacus muscle, 2.5 x 1.8 cm, slightly smaller than prior  s/p CT-guided deep bone biopsy 6/19/2019.  Cultures remain negative; path not reported   MRI 6/28 chronic discitis, diffuse OM L5, 3 and 4 cm abscesses right glute, 1.5 cm abscess or necrosis right psoterior ileum, 3.3 cm abscess  right SI joint.  Ortho input is noted  Status post brain biopsy - 6-28-19   Brain biopsy positive-necrotizing granulomas with histiocyte infiltration-may suggest mycobacterial infection.  Positive Q gold .  ? Fungal elements per prelim intra op path - continue with RIPE, B6, Ampho-- - monitor renal fxn, lfts, lytes, Cbc.   Magnesium and potassium remain low.-Add scheduled oral magnesium with IV magnesium replacement.  KCl supplement  Continue Keppra for sz prophylaxis.   Infectious disease input appreciated                 Abscess of right iliac muscle and right gluteus medius muscle- (present on admission)   Assessment & Plan    Recurrent issue since sacral fracture repair in December 2018. Has had multiple IR drainage and antibiotics. Recent cultures remain unrevealing.    s/p biopsy of the nonhealing right iliac fracture area. Showed no AFB, organism, or fungal elements.  Cultures negative.  Brain biopsy + positive focal necrotizing granulomas-may suggest mycobacterial infection.  ? Fungal infxn  Continue with  RI PE w B6 and amphotericin per ID.         Sepsis (HCC)   Assessment & Plan    This is sepsis (without associated acute organ dysfunction).    Sepsis resolved  Continue amphotericin , RIPE started.  Monitor vital signs     Hypomagnesemia- (present on admission)   Assessment & Plan    Persistent hypomagnesemia  IV with oral magnesium to be added       Hypercalcemia- (present on admission)   Assessment & Plan    Has resolved  Continue to monitor calcium     Acquired circulating anticoagulants (HCC)- (present on admission)   Assessment & Plan    Anticoagulation on hold given recent brain biopsy.  Resume when okay with neurosurgery.     Non-severe protein-calorie malnutrition (HCC)- (present on admission)   Assessment & Plan    Advance diet as tolerated  Dietary supplements       History of DVT (deep vein thrombosis)- (present on admission)   Assessment & Plan    Hold anticoagulation given recent brain biopsy  and lumbar osteomyelitis  Restart anticoagulation once permitted by neurosurgery       Essential hypertension- (present on admission)   Assessment & Plan    Continue metoprolol losartan and amlodipine  Monitor blood pressure     Chronic hyponatremia- (present on admission)   Assessment & Plan    Recurrent  Continue to monitor Na.     RLS (restless legs syndrome)- (present on admission)   Assessment & Plan    On Mirapex     Chronic pain- (present on admission)   Assessment & Plan    Continue pain management       History of breast cancer- (present on admission)   Assessment & Plan    S/p left mastectomy and breast implant.  Follow-up on intraoperative final pathology results     COPD (chronic obstructive pulmonary disease) (HCC)- (present on admission)   Assessment & Plan    Stable   RT protocol and oxygen          Discussed with multidisciplinary team plan of care.    VTE prophylaxis: SCDs

## 2019-07-05 NOTE — PROGRESS NOTES
Patients son called and stated that she left an IPAD on our floor in room 524-1. This RN looked in room and did not find an IPAD. An Ipad is not listed in the patient belongings tab. This RN looked in [facility] lock box and there was not an Ipad in there either.

## 2019-07-05 NOTE — PROGRESS NOTES
Spoke to [facility] Pharmacist regarding lack of access and Mg and abx. [facility] said to give mg now and attempt to place more access and can reschedule other medications.    Staff floor cannot place PIV. Orders received for  PIV     PICC team will not be able to place, RICU nurse said she would attempt.

## 2019-07-05 NOTE — THERAPY
"Physical Therapy Treatment completed.   Bed Mobility:  Supine to Sit: Minimal Assist  Transfers: Sit to Stand: Minimal Assist  Gait: Level Of Assist: Minimal Assist with Front-Wheel Walker       Plan of Care: Will benefit from Physical Therapy 3 times per week  Discharge Recommendations: Equipment: Will Continue to Assess for Equipment Needs. Post-acute therapy Recommend post-acute placement for continued physical therapy services prior to discharge home. Patient can tolerate post-acute therapies at a 5x/week frequency.       See \"Rehab Therapy-Acute\" Patient Summary Report for complete documentation.     Pt presenting w/ improved functional mobility from previous tx. Pt was able to perform mobility w/ greater ease. She groans and c/o high pain during mobility but was more motivated today. She was able to perform most mobility at a Francesca level. Pt was able to ambulate 10 feet. She does require FWW management and cues to keep hands on FWW. Pt did ask \"how do I walk more?\" Pt given education on increasing OOB activity and starting to use the BSC instead of bed pan. Pt continues to be at level in which pt would benefit from post acute therapy.  "

## 2019-07-05 NOTE — PROGRESS NOTES
Infectious Disease Progress Note    Author: Ashely Hinojosa M.D. Date & Time of service: 7/5/2019  9:52 AM    Chief Complaint:  Brain abscess, gluteal abscess  Vertebral OM    Interval History:  68 y.o. female admitted 5/29/2019 as a transfer from [facility] since 4/30/2019. + diabetes, SANTOSH of right hip with hardware, and breast cancer who was originally admitted to [facility] on 03/08/2019 for right hip and pelvic pain.  Work-up revealed a right iliopsoas lesion, gluteal abscess, and mult brain abscesses    6/24/2019 no fevers.  The hip pain is better.  No cough no shortness of breath  6/25/2019-no fevers.  The hip cultures remain negative.  She is scheduled for stereotactic biopsy on Friday.  6/26/2019 no fevers are noted.  Patient complains of back pain.  In significant distress.  6/27/2019-no fevers.  Continues to complain of pain.  No new issues overnight  6/28 AF WBC 6.6 planned for MRIs and biopsy today-somnolent  6/29 AF WBC 7.7 obtunded prelim path showed fungus on brain specimen  6/30 AF somnolent-not awakening to voice-slurred speech noted by Neurosurgery  7/1 AF more awake today; not oriented-ate about half of lunch c/o pain in joints  7/2 AF WBC 5.5 no new complaints  7/3 AF still c/o unrelenting right hip pain whenever awake-Falls asleep during exam  7/4 AF much more alert and conversant today-c/o left hip pain, unrelenting and would like parietal dressing changed. HA at surgical site.  Denies SE abx. No new neurodeficits  7/5 afebrile WBC 5.4.  Patient sleeping but arousable.  She denies any headache, nausea, vomiting.    Labs Reviewed, Medications Reviewed, and Wound Reviewed.    Review of Systems:  Review of Systems   Constitutional: Negative for chills and fever.   Respiratory: Negative for cough.    Cardiovascular: Negative for chest pain.   Gastrointestinal: Negative for nausea and vomiting.   Musculoskeletal: Positive for joint pain and myalgias.   Skin: Negative for rash.   Neurological:  Negative for dizziness and headaches.   All other systems reviewed and are negative.      Hemodynamics:  Temp (24hrs), Av.7 °C (98 °F), Min:36.5 °C (97.7 °F), Max:36.7 °C (98.1 °F)  Temperature: 36.6 °C (97.9 °F)  Pulse  Av.9  Min: 54  Max: 125   Blood Pressure : 133/62       Physical Exam:  Physical Exam   Constitutional: She is oriented to person, place, and time. No distress.   Thin   HENT:   Mouth/Throat: No oropharyngeal exudate.   Right parietal surgical site dressed no drainage   Eyes: Pupils are equal, round, and reactive to light. EOM are normal. No scleral icterus.   Neck: Neck supple. No JVD present.   Cardiovascular: Normal rate and regular rhythm.    No murmur heard.  Pulmonary/Chest: Effort normal. No stridor. No respiratory distress. She has no wheezes. She has no rales.   Abdominal: Soft. She exhibits no distension. There is no tenderness. There is no rebound.   Musculoskeletal: She exhibits tenderness. She exhibits no edema.   Lymphadenopathy:     She has no cervical adenopathy.   Neurological: She is alert and oriented to person, place, and time. No cranial nerve deficit. She exhibits normal muscle tone.   Skin: Skin is warm. No rash noted. She is not diaphoretic. No erythema.   Nursing note and vitals reviewed.      Meds:    Current Facility-Administered Medications:   •  magnesium chloride  •  pyridoxine  •  levETIRAcetam  •  isoniazid  •  riFAMPin  •  ethambutol  •  pyrazinamide  •  potassium chloride SA  •  acetaminophen  •  Pharmacy Consult Request  •  labetalol  •  hydrALAZINE  •  docusate sodium  •  senna-docusate  •  Pharmacy  •  NS **AND** acetaminophen **AND** diphenhydrAMINE **AND** D5W **AND** amphotericin b liposomal (AMBISOME) IV **AND** D5W **AND** NS  •  NS  •  oxyCODONE CR  •  gabapentin  •  morphine injection  •  lactobacillus rhamnosus  •  oxyCODONE immediate-release  •  sucralfate  •  acetaminophen  •  pramipexole  •  lidocaine  •  temazepam  •  cyclobenzaprine  •   Respiratory Care per Protocol  •  amLODIPine  •  cinacalcet  •  losartan  •  atorvastatin  •  omeprazole  •  metoprolol  •  ondansetron  •  ondansetron    Labs:  Recent Labs      07/03/19 0310 07/04/19 0441 07/05/19   0321   WBC  6.4  6.9  5.4   RBC  3.79*  3.61*  3.34*   HEMOGLOBIN  11.0*  10.6*  9.7*   HEMATOCRIT  33.0*  31.6*  29.9*   MCV  87.1  87.5  89.5   MCH  29.0  29.4  29.0   RDW  53.4*  53.8*  54.6*   PLATELETCT  218  216  197   MPV  9.7  9.4  9.8   NEUTSPOLYS   --   65.30   --    LYMPHOCYTES   --   17.30*   --    MONOCYTES   --   11.30   --    EOSINOPHILS   --   5.10   --    BASOPHILS   --   0.60   --      Recent Labs      07/03/19 0310 07/04/19 0441 07/05/19   0321   SODIUM  133*  133*  136   POTASSIUM  3.6  3.1*  3.4*   CHLORIDE  102  101  106   CO2  22  22  20   GLUCOSE  84  91  106*   BUN  11  15  13     Recent Labs      07/03/19 0310 07/04/19 0441 07/05/19   0321   ALBUMIN   --   3.3  3.1*   TBILIRUBIN   --   0.5  0.4   ALKPHOSPHAT   --   408*  450*   TOTPROTEIN   --   6.8  6.1   ALTSGPT   --   14  17   ASTSGOT   --   31  37   CREATININE  0.40*  0.57  0.46*       Imaging:  MRI of the brain on 6/8/2019  Impression       1.  Interval progression of supra and infratentorial intra-axial nodules and associated edema. This short-term interval progression favors infection over neoplasm though both remain considerations.  2.  Mild atrophy         Micro:  Results     Procedure Component Value Units Date/Time    Cryptococcal Antigen Titer [[id number]]     Order Status:  Sent Specimen:  Blood from Blood     Anaerobic Culture [[id number]] Collected:  06/28/19 1403    Order Status:  Completed Specimen:  Tissue Updated:  07/03/19 9997     Significant Indicator NEG     Source TISS     Site Right Parietal Lesion     Culture Result No Anaerobes isolated.    Narrative:       Surgery Specimen    AFB Culture [[id number]] Collected:  06/28/19 1403    Order Status:  Completed Specimen:  Tissue Updated:   "07/03/19 1347     Significant Indicator NEG     Source TISS     Site Right Parietal Lesion     Culture Result Culture in progress.     AFB Smear Results No acid fast bacilli seen.    Narrative:       Surgery Specimen    Fungal Culture [[id number]] Collected:  06/28/19 1403    Order Status:  Completed Specimen:  Tissue Updated:  07/03/19 1347     Significant Indicator NEG     Source TISS     Site Right Parietal Lesion     Culture Result No fungal growth to date.    Narrative:       Surgery Specimen    CULTURE TISSUE W/ GRM STAIN [[id number]] Collected:  06/28/19 1403    Order Status:  Completed Specimen:  Tissue Updated:  07/03/19 1347     Significant Indicator NEG     Source TISS     Site Right Parietal Lesion     Culture Result No growth at 72 hours.     Gram Stain Result Rare WBCs.  No organisms seen.      Narrative:       Surgery Specimen    GRAM STAIN [[id number]] Collected:  06/28/19 1403    Order Status:  Completed Specimen:  Tissue Updated:  06/29/19 2315     Significant Indicator .     Source TISS     Site Right Parietal Lesion     Gram Stain Result Rare WBCs.  No organisms seen.      Narrative:       Surgery Specimen    Acid Fast Stain [[id number]] Collected:  06/28/19 1403    Order Status:  Completed Specimen:  Tissue Updated:  06/29/19 2315     Significant Indicator NEG     Source TISS     Site Right Parietal Lesion     AFB Smear Results No acid fast bacilli seen.    Narrative:       Surgery Specimen          Assessment:  Gluteal and iliopsoas abscess  Brain abscesses vs mets  Breast cancer  DM2  +QuantiGold    Plan:  Brain abscesses   Query CNS fungal infection vs other  MRI 4/23 \"supra and infratentorial brain parenchyma. Decrease in the size of the lesions. Interval reduction in the extent of the surrounding white matter edema consistent with improvement/treatment response of the most of the multifocal brain abscesses.  MRI 5/21 showed innumerable microabscesses vs mets  Abx (vancomycin, cefepime and " "Flagyl) discontinued on 5/23 after ~8 weeks of treatment-developed new fevers on 6/4  MRI on 6/8 with interval progression of supra and infratentorial intra--axial nodules and associated edema.  New right thalamus lesion. Radiology favors infection over neoplasm though both remain considerations (had been off abx)  Mult blood cultures neg  CHAS neg 3/15 and 5/24  MRI 6/22 worsening CNS lesions  s/p biopsy- fungal appearing elements seen per Neurosurgery. +necrotizing granulomas. All stains negative in EPIC  Continue liposomal Ampho B pending final cultures and serologies  Check electrolytes daily and replace as needed  Repeat cocci and crypto -pending    Gluteal and iliopsoas abscess   OM L5, S1-3, new this admission  Recurrent abscesses, additional work  Adjacent to hardware in right hip (placed 12/17/18)  CT 4/23 \"Fluid collections within the right gluteal and iliacis muscles.. The collection within the right gluteal muscle has unchanged while the fluid collection within the right iliacis muscle is increased somewhat in size.\" 2.7 cm  Cultures 3/29 and 4/4 neg  MRI on 5/20/2019 - interval decrease in collection in R gluteal muscle and persistent multiloculated collection in R iliacus muscle.   CT 5/28 no change  s/p drainage on 5/30/2019-cultures negative  S/p removal hardware 6/5-no cultures done  1, 3 beta glucan neg  CT on 6/8 with residual abscess in the right iliacus muscle, 2.5 x 1.8 cm, slightly smaller than prior  s/p CT-guided deep bone biopsy 6/19/2019.  Cultures remain negative; path not reported   MRI 6/28 chronic discitis, diffuse OM L5, 3 and 4 cm abscesses right glute, 1.5 cm abscess or necrosis right posterior ileum, 3.3 cm abscess right SI joint  Ampho B as above and TB therapy  Drain if feasible-repeat all cultures of fluid    +QuantiGold  Query disseminated TB  Path with necrotizing granulomas  AFB stain neg  AFB cultures pending  All prior AFB cxs still neg  Started RIPE +B6 7/3  Repeat MRI 4 " weeks to assess for improvement (around 7/28/2019)    Type 2 DM  Hemoglobin A1c 6.3   Keep BS under 150 to help control current infection      CIRA Esteban  .

## 2019-07-05 NOTE — PROGRESS NOTES
Left message for Lab to add Mg lab to morning draw. No message back. Called again, lab said they will either add it or draw it now.

## 2019-07-05 NOTE — PROGRESS NOTES
Assumed pt care at 1900. Received bedside report .    Pt Alert and oriented x  3. Pt denies, chest pain, sob, nausea/vomiting, headache, blurry/double vision. Pt denies numbness/tingling. Pt indicates hip/head pain. Pt ambulating with x2 assist and walker. POC discussed and education provided on administered medications. All questions and concerns addressed. Fall precautions aspiration precautions, hourly rounding and Q4 hour neuro checks in place.

## 2019-07-06 ENCOUNTER — APPOINTMENT (OUTPATIENT)
Dept: RADIOLOGY | Facility: MEDICAL CENTER | Age: 69
DRG: 356 | End: 2019-07-06
Attending: HOSPITALIST
Payer: MEDICARE

## 2019-07-06 LAB
ALBUMIN SERPL BCP-MCNC: 3.1 G/DL (ref 3.2–4.9)
ALBUMIN/GLOB SERPL: 0.9 G/DL
ALP SERPL-CCNC: 469 U/L (ref 30–99)
ALT SERPL-CCNC: 17 U/L (ref 2–50)
ANION GAP SERPL CALC-SCNC: 8 MMOL/L (ref 0–11.9)
AST SERPL-CCNC: 33 U/L (ref 12–45)
BILIRUB SERPL-MCNC: 0.5 MG/DL (ref 0.1–1.5)
BUN SERPL-MCNC: 8 MG/DL (ref 8–22)
CALCIUM SERPL-MCNC: 8.4 MG/DL (ref 8.5–10.5)
CHLORIDE SERPL-SCNC: 104 MMOL/L (ref 96–112)
CO2 SERPL-SCNC: 21 MMOL/L (ref 20–33)
CREAT SERPL-MCNC: 0.43 MG/DL (ref 0.5–1.4)
ERYTHROCYTE [DISTWIDTH] IN BLOOD BY AUTOMATED COUNT: 54.4 FL (ref 35.9–50)
GLOBULIN SER CALC-MCNC: 3.3 G/DL (ref 1.9–3.5)
GLUCOSE SERPL-MCNC: 93 MG/DL (ref 65–99)
HCT VFR BLD AUTO: 31.2 % (ref 37–47)
HGB BLD-MCNC: 10.1 G/DL (ref 12–16)
MAGNESIUM SERPL-MCNC: 1.5 MG/DL (ref 1.5–2.5)
MCH RBC QN AUTO: 29.3 PG (ref 27–33)
MCHC RBC AUTO-ENTMCNC: 32.4 G/DL (ref 33.6–35)
MCV RBC AUTO: 90.4 FL (ref 81.4–97.8)
PLATELET # BLD AUTO: 228 K/UL (ref 164–446)
PMV BLD AUTO: 10.1 FL (ref 9–12.9)
POTASSIUM SERPL-SCNC: 3.1 MMOL/L (ref 3.6–5.5)
PROT SERPL-MCNC: 6.4 G/DL (ref 6–8.2)
RBC # BLD AUTO: 3.45 M/UL (ref 4.2–5.4)
SODIUM SERPL-SCNC: 133 MMOL/L (ref 135–145)
WBC # BLD AUTO: 6.5 K/UL (ref 4.8–10.8)

## 2019-07-06 PROCEDURE — 700105 HCHG RX REV CODE 258: Performed by: FAMILY MEDICINE

## 2019-07-06 PROCEDURE — 80053 COMPREHEN METABOLIC PANEL: CPT

## 2019-07-06 PROCEDURE — A9270 NON-COVERED ITEM OR SERVICE: HCPCS | Performed by: NURSE PRACTITIONER

## 2019-07-06 PROCEDURE — 36415 COLL VENOUS BLD VENIPUNCTURE: CPT

## 2019-07-06 PROCEDURE — A9270 NON-COVERED ITEM OR SERVICE: HCPCS | Performed by: FAMILY MEDICINE

## 2019-07-06 PROCEDURE — 700112 HCHG RX REV CODE 229: Performed by: NURSE PRACTITIONER

## 2019-07-06 PROCEDURE — 770006 HCHG ROOM/CARE - MED/SURG/GYN SEMI*

## 2019-07-06 PROCEDURE — 700102 HCHG RX REV CODE 250 W/ 637 OVERRIDE(OP): Performed by: FAMILY MEDICINE

## 2019-07-06 PROCEDURE — 85027 COMPLETE CBC AUTOMATED: CPT

## 2019-07-06 PROCEDURE — A9270 NON-COVERED ITEM OR SERVICE: HCPCS | Performed by: HOSPITALIST

## 2019-07-06 PROCEDURE — 99232 SBSQ HOSP IP/OBS MODERATE 35: CPT | Performed by: INTERNAL MEDICINE

## 2019-07-06 PROCEDURE — 99232 SBSQ HOSP IP/OBS MODERATE 35: CPT | Performed by: HOSPITALIST

## 2019-07-06 PROCEDURE — 700101 HCHG RX REV CODE 250: Performed by: FAMILY MEDICINE

## 2019-07-06 PROCEDURE — 700102 HCHG RX REV CODE 250 W/ 637 OVERRIDE(OP): Performed by: HOSPITALIST

## 2019-07-06 PROCEDURE — 700111 HCHG RX REV CODE 636 W/ 250 OVERRIDE (IP): Performed by: FAMILY MEDICINE

## 2019-07-06 PROCEDURE — 83735 ASSAY OF MAGNESIUM: CPT

## 2019-07-06 PROCEDURE — 700111 HCHG RX REV CODE 636 W/ 250 OVERRIDE (IP): Performed by: HOSPITALIST

## 2019-07-06 PROCEDURE — 700111 HCHG RX REV CODE 636 W/ 250 OVERRIDE (IP): Mod: JG | Performed by: FAMILY MEDICINE

## 2019-07-06 PROCEDURE — 700102 HCHG RX REV CODE 250 W/ 637 OVERRIDE(OP): Performed by: INTERNAL MEDICINE

## 2019-07-06 PROCEDURE — A9270 NON-COVERED ITEM OR SERVICE: HCPCS | Performed by: INTERNAL MEDICINE

## 2019-07-06 RX ORDER — POTASSIUM CHLORIDE 20 MEQ/1
40 TABLET, EXTENDED RELEASE ORAL ONCE
Status: COMPLETED | OUTPATIENT
Start: 2019-07-06 | End: 2019-07-06

## 2019-07-06 RX ORDER — POTASSIUM CHLORIDE 20 MEQ/1
40 TABLET, EXTENDED RELEASE ORAL 2 TIMES DAILY
Status: DISCONTINUED | OUTPATIENT
Start: 2019-07-06 | End: 2019-07-18

## 2019-07-06 RX ORDER — MAGNESIUM SULFATE HEPTAHYDRATE 40 MG/ML
4 INJECTION, SOLUTION INTRAVENOUS ONCE
Status: COMPLETED | OUTPATIENT
Start: 2019-07-06 | End: 2019-07-06

## 2019-07-06 RX ADMIN — ISONIAZID 300 MG: 300 TABLET ORAL at 06:15

## 2019-07-06 RX ADMIN — DEXTROSE MONOHYDRATE 30 ML: 50 INJECTION, SOLUTION INTRAVENOUS at 15:59

## 2019-07-06 RX ADMIN — PYRAZINAMIDE 1000 MG: 500 TABLET ORAL at 06:14

## 2019-07-06 RX ADMIN — DOCUSATE SODIUM 100 MG: 100 CAPSULE, LIQUID FILLED ORAL at 18:00

## 2019-07-06 RX ADMIN — DEXTROSE MONOHYDRATE 30 ML: 50 INJECTION, SOLUTION INTRAVENOUS at 18:58

## 2019-07-06 RX ADMIN — OXYCODONE HYDROCHLORIDE 10 MG: 5 TABLET ORAL at 01:54

## 2019-07-06 RX ADMIN — ACETAMINOPHEN 1000 MG: 500 TABLET ORAL at 17:05

## 2019-07-06 RX ADMIN — ACETAMINOPHEN 1000 MG: 500 TABLET ORAL at 06:15

## 2019-07-06 RX ADMIN — METOPROLOL TARTRATE 25 MG: 25 TABLET, FILM COATED ORAL at 06:15

## 2019-07-06 RX ADMIN — OXYCODONE HYDROCHLORIDE 10 MG: 5 TABLET ORAL at 20:53

## 2019-07-06 RX ADMIN — SUCRALFATE 1 G: 1 SUSPENSION ORAL at 06:15

## 2019-07-06 RX ADMIN — METOPROLOL TARTRATE 25 MG: 25 TABLET, FILM COATED ORAL at 17:12

## 2019-07-06 RX ADMIN — GABAPENTIN 100 MG: 100 CAPSULE ORAL at 11:20

## 2019-07-06 RX ADMIN — RIFAMPIN 300 MG: 300 CAPSULE ORAL at 17:07

## 2019-07-06 RX ADMIN — Medication 1 CAPSULE: at 11:19

## 2019-07-06 RX ADMIN — ETHAMBUTOL HYDROCHLORIDE 800 MG: 400 TABLET, FILM COATED ORAL at 06:15

## 2019-07-06 RX ADMIN — SODIUM CHLORIDE 500 ML: 9 INJECTION, SOLUTION INTRAVENOUS at 14:27

## 2019-07-06 RX ADMIN — DIPHENHYDRAMINE HCL 25 MG: 25 TABLET ORAL at 15:01

## 2019-07-06 RX ADMIN — MAGNESIUM SULFATE IN WATER 4 G: 40 INJECTION, SOLUTION INTRAVENOUS at 16:30

## 2019-07-06 RX ADMIN — MORPHINE SULFATE 2 MG: 4 INJECTION INTRAVENOUS at 08:01

## 2019-07-06 RX ADMIN — OMEPRAZOLE 20 MG: 20 CAPSULE, DELAYED RELEASE ORAL at 06:14

## 2019-07-06 RX ADMIN — LIDOCAINE 2 PATCH: 50 PATCH CUTANEOUS at 11:19

## 2019-07-06 RX ADMIN — PRAMIPEXOLE DIHYDROCHLORIDE 1 MG: 0.5 TABLET ORAL at 11:22

## 2019-07-06 RX ADMIN — MAGNESIUM 64 MG (MAGNESIUM CHLORIDE) TABLET,DELAYED RELEASE 128 MG: at 06:13

## 2019-07-06 RX ADMIN — SUCRALFATE 1 G: 1 SUSPENSION ORAL at 00:00

## 2019-07-06 RX ADMIN — GABAPENTIN 100 MG: 100 CAPSULE ORAL at 06:15

## 2019-07-06 RX ADMIN — PYRIDOXINE HCL TAB 50 MG 100 MG: 50 TAB at 06:14

## 2019-07-06 RX ADMIN — LEVETIRACETAM 500 MG: 500 TABLET, FILM COATED ORAL at 17:06

## 2019-07-06 RX ADMIN — CINACALCET HYDROCHLORIDE 60 MG: 30 TABLET, COATED ORAL at 06:14

## 2019-07-06 RX ADMIN — SUCRALFATE 1 G: 1 SUSPENSION ORAL at 17:10

## 2019-07-06 RX ADMIN — LEVETIRACETAM 500 MG: 500 TABLET, FILM COATED ORAL at 08:01

## 2019-07-06 RX ADMIN — PRAMIPEXOLE DIHYDROCHLORIDE 1 MG: 0.5 TABLET ORAL at 06:14

## 2019-07-06 RX ADMIN — OXYCODONE HYDROCHLORIDE 5 MG: 5 TABLET ORAL at 15:52

## 2019-07-06 RX ADMIN — OXYCODONE HYDROCHLORIDE 10 MG: 10 TABLET, FILM COATED, EXTENDED RELEASE ORAL at 06:14

## 2019-07-06 RX ADMIN — ACETAMINOPHEN 650 MG: 325 TABLET, FILM COATED ORAL at 14:13

## 2019-07-06 RX ADMIN — POTASSIUM CHLORIDE 40 MEQ: 20 TABLET, EXTENDED RELEASE ORAL at 06:15

## 2019-07-06 RX ADMIN — MAGNESIUM 64 MG (MAGNESIUM CHLORIDE) TABLET,DELAYED RELEASE 128 MG: at 17:15

## 2019-07-06 RX ADMIN — LOSARTAN POTASSIUM 50 MG: 50 TABLET ORAL at 06:14

## 2019-07-06 RX ADMIN — CYCLOBENZAPRINE 10 MG: 10 TABLET, FILM COATED ORAL at 14:13

## 2019-07-06 RX ADMIN — GABAPENTIN 100 MG: 100 CAPSULE ORAL at 17:06

## 2019-07-06 RX ADMIN — PRAMIPEXOLE DIHYDROCHLORIDE 1 MG: 0.5 TABLET ORAL at 17:07

## 2019-07-06 RX ADMIN — AMLODIPINE BESYLATE 10 MG: 5 TABLET ORAL at 06:14

## 2019-07-06 RX ADMIN — RIFAMPIN 300 MG: 300 CAPSULE ORAL at 06:14

## 2019-07-06 RX ADMIN — SUCRALFATE 1 G: 1 SUSPENSION ORAL at 11:20

## 2019-07-06 RX ADMIN — SODIUM CHLORIDE 250 ML: 9 INJECTION, SOLUTION INTRAVENOUS at 19:30

## 2019-07-06 RX ADMIN — OXYCODONE HYDROCHLORIDE 10 MG: 10 TABLET, FILM COATED, EXTENDED RELEASE ORAL at 17:08

## 2019-07-06 RX ADMIN — POTASSIUM CHLORIDE 40 MEQ: 1500 TABLET, EXTENDED RELEASE ORAL at 17:08

## 2019-07-06 RX ADMIN — AMPHOTERICIN B 285.5 MG: 50 INJECTION, POWDER, LYOPHILIZED, FOR SOLUTION INTRAVENOUS at 16:10

## 2019-07-06 RX ADMIN — ATORVASTATIN CALCIUM 10 MG: 10 TABLET, FILM COATED ORAL at 17:05

## 2019-07-06 ASSESSMENT — ENCOUNTER SYMPTOMS
COUGH: 0
NECK PAIN: 0
HEADACHES: 0
DIAPHORESIS: 0
SPEECH CHANGE: 0
SHORTNESS OF BREATH: 0
VOMITING: 0
WHEEZING: 0
DIARRHEA: 0
ABDOMINAL PAIN: 0
WEAKNESS: 1
MYALGIAS: 1
BACK PAIN: 1
CHILLS: 0
FEVER: 0
PALPITATIONS: 0
FLANK PAIN: 0
DIZZINESS: 0
FOCAL WEAKNESS: 0
SENSORY CHANGE: 0
NAUSEA: 0

## 2019-07-06 ASSESSMENT — PATIENT HEALTH QUESTIONNAIRE - PHQ9
SUM OF ALL RESPONSES TO PHQ9 QUESTIONS 1 AND 2: 0
2. FEELING DOWN, DEPRESSED, IRRITABLE, OR HOPELESS: NOT AT ALL
1. LITTLE INTEREST OR PLEASURE IN DOING THINGS: NOT AT ALL

## 2019-07-06 NOTE — THERAPY
"Occupational Therapy Treatment completed with focus on ADLs, ADL transfers, patient education and upper extremity function.  Functional Status: Supine > EOB min A, stand-pivot without AD min A, LB dressing total A using AE, UB there ex max A  Plan of Care: Will benefit from Occupational Therapy 3 times per week  Discharge Recommendations:  Equipment Will Continue to Assess for Equipment Needs. Post-acute therapy: Recommend post-acute placement for additional occupational therapy services prior to discharge home. Patient can tolerate post-acute therapies at a 5x/week frequency.    See \"Rehab Therapy-Acute\" Patient Summary Report for complete documentation.     Pt seen for OT tx to include: bed mobility, transfer training, LB dressing, therapeutic exercise. Pt grunting continuously throughout session; states it is due to 10/10 L hip pain. Attempted to  pt in breath control, however very limited carryover. Engaged pt in PNF patterns 1&2 with BUE providing light resistance for strengthening. While engaged in exercise, pt appeared less focussed on pain. Pt donned hosp pants with assist over feet and standing to pull past hips. Trained pt on use of reacher and sock-aid for sock doffing/donning, however pt struggled with use of devices. Pt is limited by: intractable pain, generalized weakness, UE muscle weakness, balance impairment, difficulty employing compensatory techniques. Acute OT to continue following while in-house.   "

## 2019-07-06 NOTE — PROGRESS NOTES
Hospital Medicine Daily Progress Note    Date of Service  7/6/2019    Chief Complaint    68 y.o. female admitted 5/29/2019 with pelvic pain and confusion .     Hospital Course      68 y.o. female transferred from [facility] on 5/29/2019 with persistent right gluteal abscesses since sacral fracture repair in December with multiple IR drainages, MRI brain showed increase in brain lesions.  Her brain lesions are known from prior imaging and there is concern they represent metastasis. Oncology aware and do not recommend further imaging.  CHAS done by Dr. Potter showed no vegetations.  Patient had completed IV Vancomycin, Cefepime and flagyl approx 8 week duration around 5-23-19 .  She has had multiple IR drainages.  Cultures remained negative.  She underwent Pelvic abscess drainage on 5/30 with negative results.  On 6/4, she became febrile again, so restarted on cefepime and vancomycin per ID.  Right hip hardware was removed on 6/5.  Repeat MRI brain on 6/7/19 saw progression of the supra and infratentorial intra-axial nodules with edema, prompting a Neurosurgery consult. Dr. [place] [facility] etiology, recommended continued antibiotics.   Cefepime + vanc were continued.  Work up was broadened to include fungal etiology, and she is started on empiric started fluconazole.  Biopsy of the hip showed no AFB, organism, or fungal elements.  Repeat CT on 6/17 showed no significant interval change in the size of the right iliacus muscle fluid collections, with changes in the right iliac bone, bilateral sacrum, and left iliac bone suspicious for osteomyelitis, and changes at L5-S1 suspicious for discitis/osteomyelitis, along with hyperdense right gluteal lesion, moderately suspicious for postoperative pseudoaneurysm with adjacent hematoma.   Biopsy of the hip showed no AFB, organism, or fungal elements.  Patient had re evaluation by Dr. Nguyen and she underwent right-sided craniotomy with resection of right sided superficial  mass on 6/28/2019.  Felt likely fungal infection on preliminary findings-patient started on IV Amphotericin.       Interval Problem Update    Afebrile.  Reports hip pain improved while at rest.  Replacing low magnesium and potassium.  Requests dressing change to right scalp.    Consultants/Specialty  Neurosurgery, Dr. Nguyen  Pulmonary Dr. Salinas      Code Status    DNR/DNI    Disposition    Plan continue physical therapy, possible skilled nursing facility placement    Review of Systems  Review of Systems   Constitutional: Negative for chills, diaphoresis and fever.   Respiratory: Negative for cough, shortness of breath and wheezing.    Cardiovascular: Negative for chest pain, palpitations and leg swelling.   Gastrointestinal: Negative for abdominal pain, diarrhea and vomiting.   Genitourinary: Negative for flank pain and hematuria.   Musculoskeletal: Positive for back pain and myalgias. Negative for neck pain.   Neurological: Positive for weakness. Negative for sensory change, speech change and focal weakness.        Physical Exam  Temp:  [36.1 °C (96.9 °F)-36.8 °C (98.2 °F)] 36.8 °C (98.2 °F)  Pulse:  [70-73] 73  Resp:  [16-19] 19  BP: (141-160)/(57-83) 160/83  SpO2:  [94 %-97 %] 95 %    Physical Exam   Constitutional: She is oriented to person, place, and time. No distress.   HENT:   Head: Normocephalic and atraumatic.   Right cranio site dressed   Eyes: EOM are normal. Right eye exhibits no discharge. Left eye exhibits no discharge. No scleral icterus.   Neck: Neck supple.   Cardiovascular: Normal rate and regular rhythm.    No murmur heard.  Pulmonary/Chest: She has no wheezes. She has no rales.   Abdominal: Soft. She exhibits no distension. There is no tenderness.   Musculoskeletal: She exhibits no edema or tenderness.   Right gluteal fluid collection palpable.    Neurological: She is alert and oriented to person, place, and time.   No focal weakness     Skin: Skin is warm and dry. She is not diaphoretic. No  pallor.   Vitals reviewed.      Fluids    Intake/Output Summary (Last 24 hours) at 07/06/19 0811  Last data filed at 07/05/19 1800   Gross per 24 hour   Intake              360 ml   Output                0 ml   Net              360 ml       Laboratory  Recent Labs      07/04/19 0441 07/05/19 0321 07/06/19   0401   WBC  6.9  5.4  6.5   RBC  3.61*  3.34*  3.45*   HEMOGLOBIN  10.6*  9.7*  10.1*   HEMATOCRIT  31.6*  29.9*  31.2*   MCV  87.5  89.5  90.4   MCH  29.4  29.0  29.3   MCHC  33.5*  32.4*  32.4*   RDW  53.8*  54.6*  54.4*   PLATELETCT  216  197  228   MPV  9.4  9.8  10.1     Recent Labs      07/04/19 0441 07/05/19 0321 07/06/19   0401   SODIUM  133*  136  133*   POTASSIUM  3.1*  3.4*  3.1*   CHLORIDE  101  106  104   CO2  22  20  21   GLUCOSE  91  106*  93   BUN  15  13  8   CREATININE  0.57  0.46*  0.43*   CALCIUM  8.3*  8.2*  8.4*                   Imaging  MR-LUMBAR SPINE-WITH & W/O   Final Result         1.  Grade 2-3 spondylolisthesis at the L5-S1 level with bilateral spondylolysis of the pars interarticularis. This causes severe bilateral neural foraminal stenosis at this level.      2.  Interval removal of posterior fusion hardware at the lumbosacral junction.      3.  Diffuse osteomyelitis involving the L5 vertebral body and the first 3 sacral segments.      4.  Chronic discitis at the L5-S1 level with anterior paraspinous abscess at this level and anterior to the S1 sacral segment.      5.  Smaller intraosseous bone bone abscesses or areas of necrosis noted in the sacrum.      6.  There is also evidence of osteomyelitis involving the jose antonio and sacrum bilaterally along the course of the previous sacral fixation screw. There is a large 4 cm abscess noted in the right gluteal soft tissues in a smaller 3 cm chronic appearing    abscess in the left gluteal musculature.      7.  There is a 1.5 cm intraosseous abscess or area of necrosis in the right posterior ilium.      8.  There is a 3.3 cm  centimeters multiloculated abscess anterior to the right sacroiliac joint.      MR-STEALTH BRAIN WITH & W/O   Final Result         1.  Innumerable subcentimeter brain metastases, many at the gray-white junction but also multiple noted throughout the basal ganglia and brainstem.      2.  Largest index lesion is noted in the right thalamus and has increased in size since previous exam and currently measures 9 mm and has a moderate amount of surrounding vasogenic edema.      US-EXTREMITY NON VASCULAR UNILATERAL RIGHT   Final Result      No right hip joint effusion. No soft tissue fluid collection.      MR-BRAIN-WITH & W/O   Final Result      1.  Innumerable supra and infratentorial enhancing nodules are again noted throughout the brain parenchyma with multiple lesions appearing somewhat larger and with increased enhancement. No associated diffusion restriction. Unchanged differential    considerations including bacterial or fungal infection versus metastatic disease.   2.  Mild diffuse cerebral substance loss.   3.  Mild microangiopathic ischemic change versus demyelination or gliosis.      CT-NEEDLE BX-DEEP BONE   Final Result      CT GUIDED RIGHT ILIUM DEEP BONE BIOPSY. SAMPLES WERE SENT TO MICROBIOLOGY FOR ANALYSIS.      CT-PELVIS WITH   Final Result         1.  No significant interval change in the size of the RIGHT iliac muscle fluid collections   2.  Hyperdense RIGHT gluteal lesion moderately suspicious for pseudoaneurysm with adjacent hematoma.   3.  Changes in the RIGHT iliac bone, BILATERAL sacrum and LEFT iliac bone suspicious for osteomyelitis   4.  Changes at L5-S1 suspicious for discitis osteomyelitis      CT-PELVIS WITH   Final Result      1.  Residual or recurrent abscess within the right iliacus muscle measuring 2.5 x 1.8 cm. It slightly smaller than on 5/28/2019      2.  Interval removal of hardware from the iliac bones and sacrum      3.  Lytic change of the right iliac bone and the sacrum. This could  be related to hardware versus osteomyelitis.      4.  Subacute fractures of the right ilium, sacrum, and there is lucency within the left iliac bone that is likely from hardware      MR-BRAIN-WITH & W/O   Final Result      1.  Interval progression of supra and infratentorial intra-axial nodules and associated edema. This short-term interval progression favors infection over neoplasm though both remain considerations.   2.  Mild atrophy      DX-PORTABLE FLUOROSCOPY < 1 HOUR   Final Result         Portable fluoroscopy utilized for 2 minutes.      DX-PELVIS-1 OR 2 VIEWS   Final Result      Status post hardware removal from the right SI joint      DX-CHEST-PORTABLE (1 VIEW)   Final Result      Interstitial prominence could be due to pulmonary edema or hypoventilatory change.      DX-CHEST-LIMITED (1 VIEW)   Final Result      LEFT basilar underinflation atelectasis or pneumonia      CT-DRAIN-HEMATOMA - SEROMA   Final Result      CT-guided RIGHT pelvic fluid collection aspiration, laboratory evaluation pending              Assessment/Plan  * Brain lesion- (present on admission)   Assessment & Plan    With right gluteal , multiloculated right iliac muscle fluid collections.   CHAS neg 3/15 and 5/24.  Cultures 3/29 and 4/4 neg. has completed multiple courses of IV antibiotics .  s/p drainage on 5/30/2019-cultures negative, S/p removal hardware 6/5-no cultures done, Panculture neg; 1, 3 beta glucan neg.  CT on 6/8 with residual abscess in the right iliacus muscle, 2.5 x 1.8 cm, slightly smaller than prior  s/p CT-guided deep bone biopsy 6/19/2019.  Cultures remain negative; path not reported   MRI 6/28 chronic discitis, diffuse OM L5, 3 and 4 cm abscesses right glute, 1.5 cm abscess or necrosis right psoterior ileum, 3.3 cm abscess right SI joint.  Ortho input is noted  Status post brain biopsy - 6-28-19   Brain biopsy positive-necrotizing granulomas with histiocyte infiltration-may suggest mycobacterial infection.  Positive  Q gold .  ? Fungal elements per prelim intra op path - continue with RIPE, B6, Ampho  Amphotericin electrolyte wasting-correct low magnesium and potassium with additional IV mag and oral Kcl.  Continue Keppra for sz prophylaxis.   Discussed with Dr. Hinojosa, ID                 Abscess of right iliac muscle and right gluteus medius muscle- (present on admission)   Assessment & Plan    Recurrent issue since sacral fracture repair in December 2018. Has had multiple IR drainage and antibiotics. Recent cultures remain unrevealing.    s/p biopsy of the nonhealing right iliac fracture area. Showed no AFB, organism, or fungal elements.  Cultures negative.  Brain biopsy + positive focal necrotizing granulomas-may suggest mycobacterial infection.  ? Fungal infxn  Continue with  RI PE w B6 and amphotericin per ID.  Discussed w Dr. Hinojosa ID .          Sepsis (HCC)   Assessment & Plan    This is sepsis (without associated acute organ dysfunction).    Sepsis resolved  Continue amphotericin , RIPE started.  Monitor vital signs     Hypomagnesemia- (present on admission)   Assessment & Plan    Persistent hypomagnesemia  Additional IV magnesium with scheduled oral  Monitor electrolytes       Hypercalcemia- (present on admission)   Assessment & Plan    Has resolved  Continue to monitor calcium     Acquired circulating anticoagulants (HCC)- (present on admission)   Assessment & Plan    Anticoagulation on hold given recent brain biopsy.    We will start warfarin when okay with neurosurgery as may have interaction with XA may have interactions with RIPE.  Discussed with pharmacy.     Non-severe protein-calorie malnutrition (HCC)- (present on admission)   Assessment & Plan    Advance diet as tolerated  Dietary supplements       History of DVT (deep vein thrombosis)- (present on admission)   Assessment & Plan    Hold anticoagulation given recent brain biopsy and lumbar osteomyelitis  Restart anticoagulation once permitted by  neurosurgery       Essential hypertension- (present on admission)   Assessment & Plan    Continue metoprolol losartan and amlodipine  Monitor blood pressure     Chronic hyponatremia- (present on admission)   Assessment & Plan    Recurrent  Continue to monitor Na.     RLS (restless legs syndrome)- (present on admission)   Assessment & Plan    On Mirapex     Chronic pain- (present on admission)   Assessment & Plan    Continue pain management       History of breast cancer- (present on admission)   Assessment & Plan    S/p left mastectomy and breast implant.  Follow-up on intraoperative final pathology results     COPD (chronic obstructive pulmonary disease) (HCC)- (present on admission)   Assessment & Plan    Stable   RT protocol and oxygen          Discussed with multidisciplinary team plan of care.    VTE prophylaxis: SCDs

## 2019-07-06 NOTE — PROGRESS NOTES
Infectious Disease Progress Note    Author: Ashely Hinojosa M.D. Date & Time of service: 7/6/2019  8:51 AM    Chief Complaint:  Brain abscess, gluteal abscess  Vertebral OM    Interval History:  68 y.o. female admitted 5/29/2019 as a transfer from [facility] since 4/30/2019. + diabetes, SANTOSH of right hip with hardware, and breast cancer who was originally admitted to [facility] on 03/08/2019 for right hip and pelvic pain.  Work-up revealed a right iliopsoas lesion, gluteal abscess, and mult brain abscesses    6/24/2019 no fevers.  The hip pain is better.  No cough no shortness of breath  6/25/2019-no fevers.  The hip cultures remain negative.  She is scheduled for stereotactic biopsy on Friday.  6/26/2019 no fevers are noted.  Patient complains of back pain.  In significant distress.  6/27/2019-no fevers.  Continues to complain of pain.  No new issues overnight  6/28 AF WBC 6.6 planned for MRIs and biopsy today-somnolent  6/29 AF WBC 7.7 obtunded prelim path showed fungus on brain specimen  6/30 AF somnolent-not awakening to voice-slurred speech noted by Neurosurgery  7/1 AF more awake today; not oriented-ate about half of lunch c/o pain in joints  7/2 AF WBC 5.5 no new complaints  7/3 AF still c/o unrelenting right hip pain whenever awake-Falls asleep during exam  7/4 AF much more alert and conversant today-c/o left hip pain, unrelenting and would like parietal dressing changed. HA at surgical site.  Denies SE abx. No new neurodeficits  7/5 afebrile WBC 5.4.  Patient sleeping but arousable.  She denies any headache, nausea, vomiting.  7/6 afebrile WBC 6.5.  Patient sitting up in chair and having excruciating back pain    Labs Reviewed, Medications Reviewed, and Wound Reviewed.    Review of Systems:  Review of Systems   Constitutional: Negative for chills and fever.   Respiratory: Negative for cough.    Cardiovascular: Negative for chest pain.   Gastrointestinal: Negative for nausea and vomiting.   Musculoskeletal:  Positive for back pain, joint pain and myalgias.        Left hip pain   Skin: Negative for rash.   Neurological: Negative for dizziness and headaches.   All other systems reviewed and are negative.      Hemodynamics:  Temp (24hrs), Av.5 °C (97.7 °F), Min:36.1 °C (96.9 °F), Max:36.8 °C (98.2 °F)  Temperature: 36.8 °C (98.2 °F)  Pulse  Av.6  Min: 54  Max: 125   Blood Pressure : 143/67       Physical Exam:  Physical Exam   Constitutional: She is oriented to person, place, and time. No distress.   Thin  Chronically ill-appearing  Elderly   HENT:   Mouth/Throat: No oropharyngeal exudate.   Right parietal surgical site dressed no drainage   Eyes: Pupils are equal, round, and reactive to light. EOM are normal. No scleral icterus.   Neck: Neck supple. No JVD present.   Cardiovascular: Normal rate and regular rhythm.    No murmur heard.  Pulmonary/Chest: Effort normal. No stridor. No respiratory distress. She has no wheezes. She has no rales.   Abdominal: Soft. She exhibits no distension. There is no tenderness. There is no rebound.   Musculoskeletal: She exhibits tenderness. She exhibits no edema.   Lymphadenopathy:     She has no cervical adenopathy.   Neurological: She is alert and oriented to person, place, and time. No cranial nerve deficit. She exhibits normal muscle tone.   Skin: Skin is warm. No rash noted. She is not diaphoretic. No erythema.   Nursing note and vitals reviewed.      Meds:    Current Facility-Administered Medications:   •  potassium chloride SA  •  magnesium chloride  •  pyridoxine  •  levETIRAcetam  •  isoniazid  •  riFAMPin  •  ethambutol  •  pyrazinamide  •  acetaminophen  •  Pharmacy Consult Request  •  labetalol  •  hydrALAZINE  •  docusate sodium  •  senna-docusate  •  Pharmacy  •  NS **AND** acetaminophen **AND** diphenhydrAMINE **AND** D5W **AND** amphotericin b liposomal (AMBISOME) IV **AND** D5W **AND** NS  •  NS  •  oxyCODONE CR  •  gabapentin  •  morphine injection  •   lactobacillus rhamnosus  •  oxyCODONE immediate-release  •  sucralfate  •  acetaminophen  •  pramipexole  •  lidocaine  •  temazepam  •  cyclobenzaprine  •  Respiratory Care per Protocol  •  amLODIPine  •  cinacalcet  •  losartan  •  atorvastatin  •  omeprazole  •  metoprolol  •  ondansetron  •  ondansetron    Labs:  Recent Labs      07/04/19 0441 07/05/19 0321 07/06/19   0401   WBC  6.9  5.4  6.5   RBC  3.61*  3.34*  3.45*   HEMOGLOBIN  10.6*  9.7*  10.1*   HEMATOCRIT  31.6*  29.9*  31.2*   MCV  87.5  89.5  90.4   MCH  29.4  29.0  29.3   RDW  53.8*  54.6*  54.4*   PLATELETCT  216  197  228   MPV  9.4  9.8  10.1   NEUTSPOLYS  65.30   --    --    LYMPHOCYTES  17.30*   --    --    MONOCYTES  11.30   --    --    EOSINOPHILS  5.10   --    --    BASOPHILS  0.60   --    --      Recent Labs      07/04/19 0441 07/05/19 0321 07/06/19   0401   SODIUM  133*  136  133*   POTASSIUM  3.1*  3.4*  3.1*   CHLORIDE  101  106  104   CO2  22  20  21   GLUCOSE  91  106*  93   BUN  15  13  8     Recent Labs      07/04/19 0441 07/05/19 0321 07/06/19   0401   ALBUMIN  3.3  3.1*  3.1*   TBILIRUBIN  0.5  0.4  0.5   ALKPHOSPHAT  408*  450*  469*   TOTPROTEIN  6.8  6.1  6.4   ALTSGPT  14  17  17   ASTSGOT  31  37  33   CREATININE  0.57  0.46*  0.43*       Imaging:  MRI of the brain on 6/8/2019  Impression       1.  Interval progression of supra and infratentorial intra-axial nodules and associated edema. This short-term interval progression favors infection over neoplasm though both remain considerations.  2.  Mild atrophy         Micro:  Results     Procedure Component Value Units Date/Time    Cryptococcal Antigen Titer [[id number]]     Order Status:  Sent Specimen:  Blood from Blood     Anaerobic Culture [[id number]] Collected:  06/28/19 8210    Order Status:  Completed Specimen:  Tissue Updated:  07/03/19 1347     Significant Indicator NEG     Source TISS     Site Right Parietal Lesion     Culture Result No Anaerobes  "isolated.    Narrative:       Surgery Specimen    AFB Culture [[id number]] Collected:  06/28/19 1403    Order Status:  Completed Specimen:  Tissue Updated:  07/03/19 1347     Significant Indicator NEG     Source TISS     Site Right Parietal Lesion     Culture Result Culture in progress.     AFB Smear Results No acid fast bacilli seen.    Narrative:       Surgery Specimen    Fungal Culture [[id number]] Collected:  06/28/19 1403    Order Status:  Completed Specimen:  Tissue Updated:  07/03/19 1347     Significant Indicator NEG     Source TISS     Site Right Parietal Lesion     Culture Result No fungal growth to date.    Narrative:       Surgery Specimen    CULTURE TISSUE W/ GRM STAIN [[id number]] Collected:  06/28/19 1403    Order Status:  Completed Specimen:  Tissue Updated:  07/03/19 1347     Significant Indicator NEG     Source TISS     Site Right Parietal Lesion     Culture Result No growth at 72 hours.     Gram Stain Result Rare WBCs.  No organisms seen.      Narrative:       Surgery Specimen    GRAM STAIN [[id number]] Collected:  06/28/19 1403    Order Status:  Completed Specimen:  Tissue Updated:  06/29/19 2315     Significant Indicator .     Source TISS     Site Right Parietal Lesion     Gram Stain Result Rare WBCs.  No organisms seen.      Narrative:       Surgery Specimen    Acid Fast Stain [[id number]] Collected:  06/28/19 1403    Order Status:  Completed Specimen:  Tissue Updated:  06/29/19 2315     Significant Indicator NEG     Source TISS     Site Right Parietal Lesion     AFB Smear Results No acid fast bacilli seen.    Narrative:       Surgery Specimen          Assessment:  Gluteal and iliopsoas abscess  Brain abscesses vs mets  Breast cancer  DM2  +QuantiGold    Plan:  Brain abscesses   Query CNS fungal infection vs other  MRI 4/23 \"supra and infratentorial brain parenchyma. Decrease in the size of the lesions. Interval reduction in the extent of the surrounding white matter edema consistent with " "improvement/treatment response of the most of the multifocal brain abscesses.  MRI 5/21 showed innumerable microabscesses vs mets  Abx (vancomycin, cefepime and Flagyl) discontinued on 5/23 after ~8 weeks of treatment-developed new fevers on 6/4  MRI on 6/8 with interval progression of supra and infratentorial intra--axial nodules and associated edema.  New right thalamus lesion. Radiology favors infection over neoplasm though both remain considerations (had been off abx)  Mult blood cultures neg  CHAS neg 3/15 and 5/24  MRI 6/22 worsening CNS lesions  S/p right craniotomy and resection of mass with biopsy on 6/28. Biopsy- fungal appearing elements seen per Neurosurgery. +necrotizing granulomas. All stains negative in EPIC  Continue liposomal Ampho B pending final cultures and serologies  Fungal culture- negative to date  Bacterial cultures-negative to date  AFB culture-in progress  Pathology- focal necrotizing granulomatous inflammation; no fungal elements or acid-fast bacilli on stains.  No malignancy identified  Check electrolytes daily and replace as needed  Repeat cocci and crypto - pending    Gluteal and iliopsoas abscess   OM L5, S1-3, new this admission  Recurrent abscesses, additional work  Adjacent to hardware in right hip (placed 12/17/18)  CT 4/23 \"Fluid collections within the right gluteal and iliacis muscles.. The collection within the right gluteal muscle has unchanged while the fluid collection within the right iliacis muscle is increased somewhat in size.\" 2.7 cm  Cultures 3/29 and 4/4 neg  MRI on 5/20/2019 - interval decrease in collection in R gluteal muscle and persistent multiloculated collection in R iliacus muscle.   CT 5/28 no change  s/p drainage on 5/30/2019-cultures negative  S/p removal hardware 6/5-no cultures done  1, 3 beta glucan neg  CT on 6/8 with residual abscess in the right iliacus muscle, 2.5 x 1.8 cm, slightly smaller than prior  s/p CT-guided deep bone biopsy 6/19/2019.  " Cultures remain negative; path not reported   MRI 6/28 chronic discitis, diffuse OM L5, 3 and 4 cm abscesses right glute, 1.5 cm abscess or necrosis right posterior ileum, 3.3 cm abscess right SI joint  Ampho B as above and TB therapy  Drain if feasible-repeat all cultures of fluid    +QuantiGold  Query disseminated TB  Path with necrotizing granulomas  AFB stain neg  AFB cultures pending  All prior AFB cxs still neg  Started RIPE +B6 7/3  Repeat MRI 4 weeks to assess for improvement (around 7/28/2019)  Monitor for visual changes while on ethambutol    Type 2 DM  Hemoglobin A1c 6.3   Keep BS under 150 to help control current infection    I have performed a physical exam and reviewed and updated ROS and plan today 7/6/2019.  In review of yesterday's note 7/5/2019, there are no changes except as documented above.      DW IM Dr Aragon and RN  .

## 2019-07-06 NOTE — PROGRESS NOTES
1420: in fusing 500 cc of NS per protocol at this time    1500: PO diphenhydramine given 30 min prior to amphotericin B liposome.

## 2019-07-07 LAB
ANION GAP SERPL CALC-SCNC: 11 MMOL/L (ref 0–11.9)
BUN SERPL-MCNC: 8 MG/DL (ref 8–22)
CALCIUM SERPL-MCNC: 8.9 MG/DL (ref 8.5–10.5)
CHLORIDE SERPL-SCNC: 106 MMOL/L (ref 96–112)
CO2 SERPL-SCNC: 19 MMOL/L (ref 20–33)
CREAT SERPL-MCNC: 0.49 MG/DL (ref 0.5–1.4)
ERYTHROCYTE [DISTWIDTH] IN BLOOD BY AUTOMATED COUNT: 53.9 FL (ref 35.9–50)
GLUCOSE SERPL-MCNC: 86 MG/DL (ref 65–99)
HCT VFR BLD AUTO: 33.2 % (ref 37–47)
HGB BLD-MCNC: 10.8 G/DL (ref 12–16)
MAGNESIUM SERPL-MCNC: 2 MG/DL (ref 1.5–2.5)
MCH RBC QN AUTO: 29.3 PG (ref 27–33)
MCHC RBC AUTO-ENTMCNC: 32.5 G/DL (ref 33.6–35)
MCV RBC AUTO: 90 FL (ref 81.4–97.8)
PLATELET # BLD AUTO: 232 K/UL (ref 164–446)
PMV BLD AUTO: 9.8 FL (ref 9–12.9)
POTASSIUM SERPL-SCNC: 3.5 MMOL/L (ref 3.6–5.5)
RBC # BLD AUTO: 3.69 M/UL (ref 4.2–5.4)
SODIUM SERPL-SCNC: 136 MMOL/L (ref 135–145)
WBC # BLD AUTO: 5.7 K/UL (ref 4.8–10.8)

## 2019-07-07 PROCEDURE — 99232 SBSQ HOSP IP/OBS MODERATE 35: CPT | Performed by: HOSPITALIST

## 2019-07-07 PROCEDURE — 85027 COMPLETE CBC AUTOMATED: CPT

## 2019-07-07 PROCEDURE — 700102 HCHG RX REV CODE 250 W/ 637 OVERRIDE(OP): Performed by: FAMILY MEDICINE

## 2019-07-07 PROCEDURE — 80048 BASIC METABOLIC PNL TOTAL CA: CPT

## 2019-07-07 PROCEDURE — 700105 HCHG RX REV CODE 258: Performed by: FAMILY MEDICINE

## 2019-07-07 PROCEDURE — A9270 NON-COVERED ITEM OR SERVICE: HCPCS | Performed by: FAMILY MEDICINE

## 2019-07-07 PROCEDURE — A9270 NON-COVERED ITEM OR SERVICE: HCPCS | Performed by: HOSPITALIST

## 2019-07-07 PROCEDURE — 83735 ASSAY OF MAGNESIUM: CPT

## 2019-07-07 PROCEDURE — 700112 HCHG RX REV CODE 229: Performed by: NURSE PRACTITIONER

## 2019-07-07 PROCEDURE — A9270 NON-COVERED ITEM OR SERVICE: HCPCS | Performed by: INTERNAL MEDICINE

## 2019-07-07 PROCEDURE — 36415 COLL VENOUS BLD VENIPUNCTURE: CPT

## 2019-07-07 PROCEDURE — 700102 HCHG RX REV CODE 250 W/ 637 OVERRIDE(OP): Performed by: INTERNAL MEDICINE

## 2019-07-07 PROCEDURE — 770006 HCHG ROOM/CARE - MED/SURG/GYN SEMI*

## 2019-07-07 PROCEDURE — 700111 HCHG RX REV CODE 636 W/ 250 OVERRIDE (IP): Mod: JG | Performed by: FAMILY MEDICINE

## 2019-07-07 PROCEDURE — 700102 HCHG RX REV CODE 250 W/ 637 OVERRIDE(OP): Performed by: HOSPITALIST

## 2019-07-07 PROCEDURE — 700101 HCHG RX REV CODE 250: Performed by: FAMILY MEDICINE

## 2019-07-07 PROCEDURE — A9270 NON-COVERED ITEM OR SERVICE: HCPCS | Performed by: NURSE PRACTITIONER

## 2019-07-07 RX ORDER — OXYCODONE HCL 20 MG/1
20 TABLET, FILM COATED, EXTENDED RELEASE ORAL EVERY 12 HOURS
Status: DISCONTINUED | OUTPATIENT
Start: 2019-07-07 | End: 2019-07-21

## 2019-07-07 RX ADMIN — METOPROLOL TARTRATE 25 MG: 25 TABLET, FILM COATED ORAL at 18:37

## 2019-07-07 RX ADMIN — RIFAMPIN 300 MG: 300 CAPSULE ORAL at 18:30

## 2019-07-07 RX ADMIN — OXYCODONE HYDROCHLORIDE 5 MG: 5 TABLET ORAL at 07:22

## 2019-07-07 RX ADMIN — POTASSIUM CHLORIDE 40 MEQ: 20 TABLET, EXTENDED RELEASE ORAL at 18:37

## 2019-07-07 RX ADMIN — RIFAMPIN 300 MG: 300 CAPSULE ORAL at 06:26

## 2019-07-07 RX ADMIN — PRAMIPEXOLE DIHYDROCHLORIDE 1 MG: 0.5 TABLET ORAL at 06:26

## 2019-07-07 RX ADMIN — Medication 1 CAPSULE: at 06:26

## 2019-07-07 RX ADMIN — LEVETIRACETAM 500 MG: 500 TABLET, FILM COATED ORAL at 18:38

## 2019-07-07 RX ADMIN — LEVETIRACETAM 500 MG: 500 TABLET, FILM COATED ORAL at 06:27

## 2019-07-07 RX ADMIN — ACETAMINOPHEN 650 MG: 325 TABLET, FILM COATED ORAL at 16:30

## 2019-07-07 RX ADMIN — OXYCODONE HYDROCHLORIDE 10 MG: 5 TABLET ORAL at 03:21

## 2019-07-07 RX ADMIN — POTASSIUM CHLORIDE 40 MEQ: 20 TABLET, EXTENDED RELEASE ORAL at 06:27

## 2019-07-07 RX ADMIN — DOCUSATE SODIUM 100 MG: 100 CAPSULE, LIQUID FILLED ORAL at 18:37

## 2019-07-07 RX ADMIN — SUCRALFATE 1 G: 1 SUSPENSION ORAL at 18:43

## 2019-07-07 RX ADMIN — GABAPENTIN 100 MG: 100 CAPSULE ORAL at 13:59

## 2019-07-07 RX ADMIN — MAGNESIUM 64 MG (MAGNESIUM CHLORIDE) TABLET,DELAYED RELEASE 128 MG: at 06:27

## 2019-07-07 RX ADMIN — SUCRALFATE 1 G: 1 SUSPENSION ORAL at 06:26

## 2019-07-07 RX ADMIN — AMPHOTERICIN B 285.5 MG: 50 INJECTION, POWDER, LYOPHILIZED, FOR SOLUTION INTRAVENOUS at 16:53

## 2019-07-07 RX ADMIN — OXYCODONE HYDROCHLORIDE 5 MG: 5 TABLET ORAL at 22:08

## 2019-07-07 RX ADMIN — TEMAZEPAM 15 MG: 15 CAPSULE ORAL at 22:08

## 2019-07-07 RX ADMIN — OXYCODONE HYDROCHLORIDE 10 MG: 10 TABLET, FILM COATED, EXTENDED RELEASE ORAL at 06:25

## 2019-07-07 RX ADMIN — AMLODIPINE BESYLATE 10 MG: 5 TABLET ORAL at 06:26

## 2019-07-07 RX ADMIN — GABAPENTIN 100 MG: 100 CAPSULE ORAL at 18:37

## 2019-07-07 RX ADMIN — PRAMIPEXOLE DIHYDROCHLORIDE 1 MG: 0.5 TABLET ORAL at 13:59

## 2019-07-07 RX ADMIN — SODIUM CHLORIDE 500 ML: 9 INJECTION, SOLUTION INTRAVENOUS at 16:06

## 2019-07-07 RX ADMIN — PYRIDOXINE HCL TAB 50 MG 100 MG: 50 TAB at 06:28

## 2019-07-07 RX ADMIN — CINACALCET HYDROCHLORIDE 60 MG: 30 TABLET, COATED ORAL at 06:26

## 2019-07-07 RX ADMIN — CYCLOBENZAPRINE 10 MG: 10 TABLET, FILM COATED ORAL at 07:20

## 2019-07-07 RX ADMIN — SODIUM CHLORIDE 250 ML: 9 INJECTION, SOLUTION INTRAVENOUS at 18:44

## 2019-07-07 RX ADMIN — ACETAMINOPHEN 1000 MG: 500 TABLET ORAL at 06:26

## 2019-07-07 RX ADMIN — PYRAZINAMIDE 1000 MG: 500 TABLET ORAL at 06:26

## 2019-07-07 RX ADMIN — ATORVASTATIN CALCIUM 10 MG: 10 TABLET, FILM COATED ORAL at 18:37

## 2019-07-07 RX ADMIN — DEXTROSE MONOHYDRATE 30 ML: 50 INJECTION, SOLUTION INTRAVENOUS at 18:30

## 2019-07-07 RX ADMIN — SUCRALFATE 1 G: 1 SUSPENSION ORAL at 01:10

## 2019-07-07 RX ADMIN — DEXTROSE MONOHYDRATE 30 ML: 50 INJECTION, SOLUTION INTRAVENOUS at 16:45

## 2019-07-07 RX ADMIN — ISONIAZID 300 MG: 300 TABLET ORAL at 06:27

## 2019-07-07 RX ADMIN — SUCRALFATE 1 G: 1 SUSPENSION ORAL at 23:25

## 2019-07-07 RX ADMIN — GABAPENTIN 100 MG: 100 CAPSULE ORAL at 06:26

## 2019-07-07 RX ADMIN — METOPROLOL TARTRATE 25 MG: 25 TABLET, FILM COATED ORAL at 06:28

## 2019-07-07 RX ADMIN — OXYCODONE HYDROCHLORIDE 20 MG: 20 TABLET, FILM COATED, EXTENDED RELEASE ORAL at 18:37

## 2019-07-07 RX ADMIN — ETHAMBUTOL HYDROCHLORIDE 800 MG: 400 TABLET, FILM COATED ORAL at 06:27

## 2019-07-07 RX ADMIN — LOSARTAN POTASSIUM 50 MG: 50 TABLET ORAL at 06:26

## 2019-07-07 RX ADMIN — SUCRALFATE 1 G: 1 SUSPENSION ORAL at 12:00

## 2019-07-07 RX ADMIN — PRAMIPEXOLE DIHYDROCHLORIDE 1 MG: 0.5 TABLET ORAL at 18:30

## 2019-07-07 RX ADMIN — DIPHENHYDRAMINE HCL 25 MG: 25 TABLET ORAL at 16:30

## 2019-07-07 RX ADMIN — OMEPRAZOLE 20 MG: 20 CAPSULE, DELAYED RELEASE ORAL at 06:26

## 2019-07-07 RX ADMIN — LIDOCAINE 2 PATCH: 50 PATCH CUTANEOUS at 13:59

## 2019-07-07 ASSESSMENT — ENCOUNTER SYMPTOMS
SENSORY CHANGE: 0
HALLUCINATIONS: 0
WHEEZING: 0
SHORTNESS OF BREATH: 0
ABDOMINAL PAIN: 0
BACK PAIN: 1
FOCAL WEAKNESS: 0
MYALGIAS: 1
WEAKNESS: 1
SPEECH CHANGE: 0
VOMITING: 0
FLANK PAIN: 0
FEVER: 0
PALPITATIONS: 0
NERVOUS/ANXIOUS: 1
COUGH: 0
CHILLS: 0
NECK PAIN: 0
DIARRHEA: 0

## 2019-07-07 NOTE — PROGRESS NOTES
Paged neuro at this time. Old dressing on patients head has not been addressed. It is the original dressing from her procedure and should be removed or changed, also would be good to know if she can shower.     0915: was told dressing should have been removed at POD #5, staples should be removed on POD #14

## 2019-07-07 NOTE — PROGRESS NOTES
Hospital Medicine Daily Progress Note    Date of Service  7/7/2019    Chief Complaint    68 y.o. female admitted 5/29/2019 with pelvic pain and confusion .     Hospital Course      68 y.o. female transferred from [facility] on 5/29/2019 with persistent right gluteal abscesses since sacral fracture repair in December with multiple IR drainages, MRI brain showed increase in brain lesions.  Her brain lesions are known from prior imaging and there is concern they represent metastasis. Oncology aware and do not recommend further imaging.  CHAS done by Dr. Potter showed no vegetations.  Patient had completed IV Vancomycin, Cefepime and flagyl approx 8 week duration around 5-23-19 .  She has had multiple IR drainages.  Cultures remained negative.  She underwent Pelvic abscess drainage on 5/30 with negative results.  On 6/4, she became febrile again, so restarted on cefepime and vancomycin per ID.  Right hip hardware was removed on 6/5.  Repeat MRI brain on 6/7/19 saw progression of the supra and infratentorial intra-axial nodules with edema, prompting a Neurosurgery consult. Dr. [place] [facility] [place], recommended continued antibiotics.   Cefepime + vanc were continued.  Work up was broadened to include fungal etiology, and she is started on empiric started fluconazole.  Biopsy of the hip showed no AFB, organism, or fungal elements.  Repeat CT on 6/17 showed no significant interval change in the size of the right iliacus muscle fluid collections, with changes in the right iliac bone, bilateral sacrum, and left iliac bone suspicious for osteomyelitis, and changes at L5-S1 suspicious for discitis/osteomyelitis, along with hyperdense right gluteal lesion, moderately suspicious for postoperative pseudoaneurysm with adjacent hematoma.   Biopsy of the hip showed no AFB, organism, or fungal elements.  Patient had re evaluation by Dr. Nguyen and she underwent right-sided craniotomy with resection of right sided superficial  mass on 6/28/2019.  Felt likely fungal infection on preliminary findings-patient started on IV Amphotericin.       Interval Problem Update I see    Complaints of uncontrolled left hip pain when mobilizing.  Persistent hypokalemia-improved.  Afebrile.      Consultants/Specialty  Neurosurgery, Dr. Nguyen  Pulmonary Dr. Salinas      Code Status    DNR/DNI    Disposition    Plan continue physical therapy, possible skilled nursing facility placement    Review of Systems  Review of Systems   Constitutional: Negative for chills and fever.   Respiratory: Negative for cough, shortness of breath and wheezing.    Cardiovascular: Negative for chest pain, palpitations and leg swelling.   Gastrointestinal: Negative for abdominal pain, diarrhea and vomiting.   Genitourinary: Negative for flank pain and hematuria.   Musculoskeletal: Positive for back pain, joint pain and myalgias. Negative for neck pain.        Bilateral hip pain left greater than right.   Neurological: Positive for weakness. Negative for sensory change, speech change and focal weakness.   Psychiatric/Behavioral: Negative for hallucinations. The patient is nervous/anxious.         Physical Exam  Temp:  [36.1 °C (97 °F)-37.1 °C (98.7 °F)] 36.7 °C (98 °F)  Pulse:  [49-69] 69  Resp:  [16-20] 20  BP: (130-155)/(67-83) 130/80  SpO2:  [93 %-98 %] 93 %    Physical Exam   Constitutional: She is oriented to person, place, and time. No distress.   HENT:   Head: Normocephalic and atraumatic.   Right cranio site dressed   Eyes: EOM are normal. Right eye exhibits no discharge. Left eye exhibits no discharge. No scleral icterus.   Neck: Neck supple.   Cardiovascular: Normal rate and regular rhythm.    No murmur heard.  Pulmonary/Chest: She has no wheezes. She has no rales.   Abdominal: Soft. She exhibits no distension. There is no tenderness.   Musculoskeletal: She exhibits no edema or tenderness.   Neurological: She is alert and oriented to person, place, and time.   No focal  weakness     Skin: Skin is warm and dry. She is not diaphoretic. No pallor.   Vitals reviewed.      Fluids    Intake/Output Summary (Last 24 hours) at 07/07/19 1527  Last data filed at 07/07/19 0738   Gross per 24 hour   Intake              120 ml   Output              400 ml   Net             -280 ml       Laboratory  Recent Labs      07/05/19   0321  07/06/19   0401  07/07/19   0256   WBC  5.4  6.5  5.7   RBC  3.34*  3.45*  3.69*   HEMOGLOBIN  9.7*  10.1*  10.8*   HEMATOCRIT  29.9*  31.2*  33.2*   MCV  89.5  90.4  90.0   MCH  29.0  29.3  29.3   MCHC  32.4*  32.4*  32.5*   RDW  54.6*  54.4*  53.9*   PLATELETCT  197  228  232   MPV  9.8  10.1  9.8     Recent Labs      07/05/19 0321 07/06/19   0401  07/07/19   0256   SODIUM  136  133*  136   POTASSIUM  3.4*  3.1*  3.5*   CHLORIDE  106  104  106   CO2  20  21  19*   GLUCOSE  106*  93  86   BUN  13  8  8   CREATININE  0.46*  0.43*  0.49*   CALCIUM  8.2*  8.4*  8.9                   Imaging  MR-LUMBAR SPINE-WITH & W/O   Final Result         1.  Grade 2-3 spondylolisthesis at the L5-S1 level with bilateral spondylolysis of the pars interarticularis. This causes severe bilateral neural foraminal stenosis at this level.      2.  Interval removal of posterior fusion hardware at the lumbosacral junction.      3.  Diffuse osteomyelitis involving the L5 vertebral body and the first 3 sacral segments.      4.  Chronic discitis at the L5-S1 level with anterior paraspinous abscess at this level and anterior to the S1 sacral segment.      5.  Smaller intraosseous bone bone abscesses or areas of necrosis noted in the sacrum.      6.  There is also evidence of osteomyelitis involving the jose antonio and sacrum bilaterally along the course of the previous sacral fixation screw. There is a large 4 cm abscess noted in the right gluteal soft tissues in a smaller 3 cm chronic appearing    abscess in the left gluteal musculature.      7.  There is a 1.5 cm intraosseous abscess or area of  necrosis in the right posterior ilium.      8.  There is a 3.3 cm centimeters multiloculated abscess anterior to the right sacroiliac joint.      MR-STEALTH BRAIN WITH & W/O   Final Result         1.  Innumerable subcentimeter brain metastases, many at the gray-white junction but also multiple noted throughout the basal ganglia and brainstem.      2.  Largest index lesion is noted in the right thalamus and has increased in size since previous exam and currently measures 9 mm and has a moderate amount of surrounding vasogenic edema.      US-EXTREMITY NON VASCULAR UNILATERAL RIGHT   Final Result      No right hip joint effusion. No soft tissue fluid collection.      MR-BRAIN-WITH & W/O   Final Result      1.  Innumerable supra and infratentorial enhancing nodules are again noted throughout the brain parenchyma with multiple lesions appearing somewhat larger and with increased enhancement. No associated diffusion restriction. Unchanged differential    considerations including bacterial or fungal infection versus metastatic disease.   2.  Mild diffuse cerebral substance loss.   3.  Mild microangiopathic ischemic change versus demyelination or gliosis.      CT-NEEDLE BX-DEEP BONE   Final Result      CT GUIDED RIGHT ILIUM DEEP BONE BIOPSY. SAMPLES WERE SENT TO MICROBIOLOGY FOR ANALYSIS.      CT-PELVIS WITH   Final Result         1.  No significant interval change in the size of the RIGHT iliac muscle fluid collections   2.  Hyperdense RIGHT gluteal lesion moderately suspicious for pseudoaneurysm with adjacent hematoma.   3.  Changes in the RIGHT iliac bone, BILATERAL sacrum and LEFT iliac bone suspicious for osteomyelitis   4.  Changes at L5-S1 suspicious for discitis osteomyelitis      CT-PELVIS WITH   Final Result      1.  Residual or recurrent abscess within the right iliacus muscle measuring 2.5 x 1.8 cm. It slightly smaller than on 5/28/2019      2.  Interval removal of hardware from the iliac bones and sacrum       3.  Lytic change of the right iliac bone and the sacrum. This could be related to hardware versus osteomyelitis.      4.  Subacute fractures of the right ilium, sacrum, and there is lucency within the left iliac bone that is likely from hardware      MR-BRAIN-WITH & W/O   Final Result      1.  Interval progression of supra and infratentorial intra-axial nodules and associated edema. This short-term interval progression favors infection over neoplasm though both remain considerations.   2.  Mild atrophy      DX-PORTABLE FLUOROSCOPY < 1 HOUR   Final Result         Portable fluoroscopy utilized for 2 minutes.      DX-PELVIS-1 OR 2 VIEWS   Final Result      Status post hardware removal from the right SI joint      DX-CHEST-PORTABLE (1 VIEW)   Final Result      Interstitial prominence could be due to pulmonary edema or hypoventilatory change.      DX-CHEST-LIMITED (1 VIEW)   Final Result      LEFT basilar underinflation atelectasis or pneumonia      CT-DRAIN-HEMATOMA - SEROMA   Final Result      CT-guided RIGHT pelvic fluid collection aspiration, laboratory evaluation pending              Assessment/Plan  * Brain lesion- (present on admission)   Assessment & Plan    With right gluteal , multiloculated right iliac muscle fluid collections.   CHAS neg 3/15 and 5/24.  Cultures 3/29 and 4/4 neg. has completed multiple courses of IV antibiotics .  s/p drainage on 5/30/2019-cultures negative, S/p removal hardware 6/5-no cultures done, Panculture neg; 1, 3 beta glucan neg.  CT on 6/8 with residual abscess in the right iliacus muscle, 2.5 x 1.8 cm, slightly smaller than prior  s/p CT-guided deep bone biopsy 6/19/2019.  Cultures remain negative; path not reported   MRI 6/28 chronic discitis, diffuse OM L5, 3 and 4 cm abscesses right glute, 1.5 cm abscess or necrosis right psoterior ileum, 3.3 cm abscess right SI joint.  Ortho input is noted  Status post brain biopsy - 6-28-19   Brain biopsy positive-necrotizing granulomas with  histiocyte infiltration-may suggest mycobacterial infection.  Positive Q gold .  ? Fungal elements per prelim intra op path - continue with RIPE, B6, Ampho  Amphotericin electrolyte wasting-mag stabilized.  Hypokalemic-continue KCl.  Scheduled mag  Monitor electrolytes.  Continue Keppra for sz prophylaxis.                    Abscess of right iliac muscle and right gluteus medius muscle- (present on admission)   Assessment & Plan    Recurrent issue since sacral fracture repair in December 2018. Has had multiple IR drainage and antibiotics. Recent cultures remain unrevealing.    s/p biopsy of the nonhealing right iliac fracture area. Showed no AFB, organism, or fungal elements.  Cultures negative.  Brain biopsy + positive focal necrotizing granulomas-may suggest mycobacterial infection.  ? Fungal infxn  Pain control.  Increase OxyContin.  Continue Lidoderm patch, PRN oxycodone, IV morphine for severe breakthrough pain  Continue with  RI PE w B6 and amphotericin per ID.  Discussed w Dr. Hinojosa ID .          Sepsis (HCC)   Assessment & Plan    This is sepsis (without associated acute organ dysfunction).    Sepsis resolved  Continue amphotericin , RIPE started.  Monitor vital signs     Hypomagnesemia- (present on admission)   Assessment & Plan    Corrected  Scheduled magnesium  Monitor electrolytes       Hypercalcemia- (present on admission)   Assessment & Plan    Has resolved  Continue to monitor calcium     Acquired circulating anticoagulants (HCC)- (present on admission)   Assessment & Plan    Anticoagulation on hold given recent brain biopsy.    We will start warfarin when okay with neurosurgery as may have interaction with XA may have interactions with RIPE.  Discussed with pharmacy.     Non-severe protein-calorie malnutrition (HCC)- (present on admission)   Assessment & Plan    Advance diet as tolerated  Dietary supplements       History of DVT (deep vein thrombosis)- (present on admission)   Assessment & Plan     Hold anticoagulation given recent brain biopsy and lumbar osteomyelitis  Restart anticoagulation once permitted by neurosurgery       Essential hypertension- (present on admission)   Assessment & Plan    Continue metoprolol losartan and amlodipine  Monitor blood pressure     Chronic hyponatremia- (present on admission)   Assessment & Plan    Recurrent  Continue to monitor Na.     RLS (restless legs syndrome)- (present on admission)   Assessment & Plan    On Mirapex     Chronic pain- (present on admission)   Assessment & Plan    Continue pain management       History of breast cancer- (present on admission)   Assessment & Plan    S/p left mastectomy and breast implant.  Follow-up on intraoperative final pathology results     COPD (chronic obstructive pulmonary disease) (HCC)- (present on admission)   Assessment & Plan    Stable   RT protocol and oxygen          Discussed with ID plan of care.    VTE prophylaxis: SCDs

## 2019-07-08 ENCOUNTER — APPOINTMENT (OUTPATIENT)
Dept: RADIOLOGY | Facility: MEDICAL CENTER | Age: 69
DRG: 356 | End: 2019-07-08
Attending: HOSPITALIST
Payer: MEDICARE

## 2019-07-08 PROBLEM — L92.9 GRANULOMATOSIS: Status: ACTIVE | Noted: 2019-07-08

## 2019-07-08 LAB
ALBUMIN SERPL BCP-MCNC: 3.1 G/DL (ref 3.2–4.9)
ALBUMIN/GLOB SERPL: 1 G/DL
ALP SERPL-CCNC: 351 U/L (ref 30–99)
ALT SERPL-CCNC: 10 U/L (ref 2–50)
ANION GAP SERPL CALC-SCNC: 9 MMOL/L (ref 0–11.9)
AST SERPL-CCNC: 24 U/L (ref 12–45)
BILIRUB SERPL-MCNC: 0.3 MG/DL (ref 0.1–1.5)
BUN SERPL-MCNC: 14 MG/DL (ref 8–22)
C IMMITIS IGM SPEC QL IA: 0.3 IV
CALCIUM SERPL-MCNC: 8.4 MG/DL (ref 8.5–10.5)
CHLORIDE SERPL-SCNC: 110 MMOL/L (ref 96–112)
CO2 SERPL-SCNC: 18 MMOL/L (ref 20–33)
COCCIDIOIDES AB SPEC QL ID: NORMAL
COCCIDIOIDES AB TITR SER CF: NORMAL {TITER}
COCCIDIOIDES IGG SPEC QL IA: 0.4 IV
CREAT SERPL-MCNC: 0.53 MG/DL (ref 0.5–1.4)
ERYTHROCYTE [DISTWIDTH] IN BLOOD BY AUTOMATED COUNT: 55.9 FL (ref 35.9–50)
GLOBULIN SER CALC-MCNC: 3 G/DL (ref 1.9–3.5)
GLUCOSE SERPL-MCNC: 80 MG/DL (ref 65–99)
HCT VFR BLD AUTO: 32.1 % (ref 37–47)
HGB BLD-MCNC: 9.9 G/DL (ref 12–16)
MAGNESIUM SERPL-MCNC: 1.2 MG/DL (ref 1.5–2.5)
MCH RBC QN AUTO: 28.2 PG (ref 27–33)
MCHC RBC AUTO-ENTMCNC: 30.8 G/DL (ref 33.6–35)
MCV RBC AUTO: 91.5 FL (ref 81.4–97.8)
PHOSPHATE SERPL-MCNC: 4.7 MG/DL (ref 2.5–4.5)
PLATELET # BLD AUTO: 227 K/UL (ref 164–446)
PMV BLD AUTO: 9.4 FL (ref 9–12.9)
POTASSIUM SERPL-SCNC: 3.8 MMOL/L (ref 3.6–5.5)
PROT SERPL-MCNC: 6.1 G/DL (ref 6–8.2)
RBC # BLD AUTO: 3.51 M/UL (ref 4.2–5.4)
SODIUM SERPL-SCNC: 137 MMOL/L (ref 135–145)
WBC # BLD AUTO: 6.4 K/UL (ref 4.8–10.8)

## 2019-07-08 PROCEDURE — 99233 SBSQ HOSP IP/OBS HIGH 50: CPT | Performed by: HOSPITALIST

## 2019-07-08 PROCEDURE — A9270 NON-COVERED ITEM OR SERVICE: HCPCS | Performed by: HOSPITALIST

## 2019-07-08 PROCEDURE — 86352 CELL FUNCTION ASSAY W/STIM: CPT

## 2019-07-08 PROCEDURE — 700102 HCHG RX REV CODE 250 W/ 637 OVERRIDE(OP): Performed by: HOSPITALIST

## 2019-07-08 PROCEDURE — 700111 HCHG RX REV CODE 636 W/ 250 OVERRIDE (IP): Mod: JG | Performed by: FAMILY MEDICINE

## 2019-07-08 PROCEDURE — 85027 COMPLETE CBC AUTOMATED: CPT

## 2019-07-08 PROCEDURE — A9270 NON-COVERED ITEM OR SERVICE: HCPCS | Performed by: FAMILY MEDICINE

## 2019-07-08 PROCEDURE — A9270 NON-COVERED ITEM OR SERVICE: HCPCS | Performed by: NURSE PRACTITIONER

## 2019-07-08 PROCEDURE — 700112 HCHG RX REV CODE 229: Performed by: NURSE PRACTITIONER

## 2019-07-08 PROCEDURE — 700101 HCHG RX REV CODE 250: Performed by: FAMILY MEDICINE

## 2019-07-08 PROCEDURE — 84100 ASSAY OF PHOSPHORUS: CPT

## 2019-07-08 PROCEDURE — A9270 NON-COVERED ITEM OR SERVICE: HCPCS | Performed by: INTERNAL MEDICINE

## 2019-07-08 PROCEDURE — 97116 GAIT TRAINING THERAPY: CPT

## 2019-07-08 PROCEDURE — 97530 THERAPEUTIC ACTIVITIES: CPT

## 2019-07-08 PROCEDURE — 700102 HCHG RX REV CODE 250 W/ 637 OVERRIDE(OP): Performed by: FAMILY MEDICINE

## 2019-07-08 PROCEDURE — 700105 HCHG RX REV CODE 258: Performed by: FAMILY MEDICINE

## 2019-07-08 PROCEDURE — 36415 COLL VENOUS BLD VENIPUNCTURE: CPT

## 2019-07-08 PROCEDURE — 99233 SBSQ HOSP IP/OBS HIGH 50: CPT | Performed by: INTERNAL MEDICINE

## 2019-07-08 PROCEDURE — 700102 HCHG RX REV CODE 250 W/ 637 OVERRIDE(OP): Performed by: INTERNAL MEDICINE

## 2019-07-08 PROCEDURE — 05HY33Z INSERTION OF INFUSION DEVICE INTO UPPER VEIN, PERCUTANEOUS APPROACH: ICD-10-PCS | Performed by: HOSPITALIST

## 2019-07-08 PROCEDURE — 83735 ASSAY OF MAGNESIUM: CPT

## 2019-07-08 PROCEDURE — 770006 HCHG ROOM/CARE - MED/SURG/GYN SEMI*

## 2019-07-08 PROCEDURE — 700111 HCHG RX REV CODE 636 W/ 250 OVERRIDE (IP): Performed by: HOSPITALIST

## 2019-07-08 PROCEDURE — 80053 COMPREHEN METABOLIC PANEL: CPT

## 2019-07-08 RX ORDER — MAGNESIUM SULFATE HEPTAHYDRATE 40 MG/ML
4 INJECTION, SOLUTION INTRAVENOUS ONCE
Status: COMPLETED | OUTPATIENT
Start: 2019-07-08 | End: 2019-07-08

## 2019-07-08 RX ORDER — POTASSIUM CHLORIDE 20 MEQ/1
40 TABLET, EXTENDED RELEASE ORAL ONCE
Status: COMPLETED | OUTPATIENT
Start: 2019-07-08 | End: 2019-07-08

## 2019-07-08 RX ADMIN — AMLODIPINE BESYLATE 10 MG: 5 TABLET ORAL at 04:36

## 2019-07-08 RX ADMIN — OMEPRAZOLE 20 MG: 20 CAPSULE, DELAYED RELEASE ORAL at 04:35

## 2019-07-08 RX ADMIN — DIPHENHYDRAMINE HCL 25 MG: 25 TABLET ORAL at 16:17

## 2019-07-08 RX ADMIN — OXYCODONE HYDROCHLORIDE 20 MG: 20 TABLET, FILM COATED, EXTENDED RELEASE ORAL at 04:36

## 2019-07-08 RX ADMIN — PRAMIPEXOLE DIHYDROCHLORIDE 1 MG: 0.5 TABLET ORAL at 17:30

## 2019-07-08 RX ADMIN — DOCUSATE SODIUM 100 MG: 100 CAPSULE, LIQUID FILLED ORAL at 04:36

## 2019-07-08 RX ADMIN — MAGNESIUM SULFATE IN WATER 4 G: 40 INJECTION, SOLUTION INTRAVENOUS at 11:48

## 2019-07-08 RX ADMIN — POTASSIUM CHLORIDE 40 MEQ: 20 TABLET, EXTENDED RELEASE ORAL at 04:35

## 2019-07-08 RX ADMIN — ACETAMINOPHEN 1000 MG: 500 TABLET ORAL at 04:36

## 2019-07-08 RX ADMIN — LEVETIRACETAM 500 MG: 500 TABLET, FILM COATED ORAL at 17:29

## 2019-07-08 RX ADMIN — OXYCODONE HYDROCHLORIDE 5 MG: 5 TABLET ORAL at 08:21

## 2019-07-08 RX ADMIN — ATORVASTATIN CALCIUM 10 MG: 10 TABLET, FILM COATED ORAL at 17:29

## 2019-07-08 RX ADMIN — DEXTROSE MONOHYDRATE 30 ML: 50 INJECTION, SOLUTION INTRAVENOUS at 19:26

## 2019-07-08 RX ADMIN — GABAPENTIN 100 MG: 100 CAPSULE ORAL at 11:57

## 2019-07-08 RX ADMIN — OXYCODONE HYDROCHLORIDE 20 MG: 20 TABLET, FILM COATED, EXTENDED RELEASE ORAL at 17:29

## 2019-07-08 RX ADMIN — CYCLOBENZAPRINE 10 MG: 10 TABLET, FILM COATED ORAL at 21:43

## 2019-07-08 RX ADMIN — POTASSIUM CHLORIDE 40 MEQ: 1500 TABLET, EXTENDED RELEASE ORAL at 11:57

## 2019-07-08 RX ADMIN — LIDOCAINE 2 PATCH: 50 PATCH CUTANEOUS at 12:56

## 2019-07-08 RX ADMIN — PRAMIPEXOLE DIHYDROCHLORIDE 1 MG: 0.5 TABLET ORAL at 11:57

## 2019-07-08 RX ADMIN — LEVETIRACETAM 500 MG: 500 TABLET, FILM COATED ORAL at 04:35

## 2019-07-08 RX ADMIN — ISONIAZID 300 MG: 300 TABLET ORAL at 04:36

## 2019-07-08 RX ADMIN — CINACALCET HYDROCHLORIDE 60 MG: 30 TABLET, COATED ORAL at 04:35

## 2019-07-08 RX ADMIN — MAGNESIUM 64 MG (MAGNESIUM CHLORIDE) TABLET,DELAYED RELEASE 128 MG: at 17:29

## 2019-07-08 RX ADMIN — ACETAMINOPHEN 650 MG: 325 TABLET, FILM COATED ORAL at 16:17

## 2019-07-08 RX ADMIN — ETHAMBUTOL HYDROCHLORIDE 800 MG: 400 TABLET, FILM COATED ORAL at 04:35

## 2019-07-08 RX ADMIN — OXYCODONE HYDROCHLORIDE 5 MG: 5 TABLET ORAL at 20:24

## 2019-07-08 RX ADMIN — METOPROLOL TARTRATE 25 MG: 25 TABLET, FILM COATED ORAL at 04:35

## 2019-07-08 RX ADMIN — TEMAZEPAM 15 MG: 15 CAPSULE ORAL at 22:43

## 2019-07-08 RX ADMIN — SODIUM CHLORIDE 500 ML: 9 INJECTION, SOLUTION INTRAVENOUS at 15:39

## 2019-07-08 RX ADMIN — RIFAMPIN 300 MG: 300 CAPSULE ORAL at 04:35

## 2019-07-08 RX ADMIN — PYRAZINAMIDE 1000 MG: 500 TABLET ORAL at 04:34

## 2019-07-08 RX ADMIN — GABAPENTIN 100 MG: 100 CAPSULE ORAL at 04:36

## 2019-07-08 RX ADMIN — OXYCODONE HYDROCHLORIDE 5 MG: 5 TABLET ORAL at 13:39

## 2019-07-08 RX ADMIN — RIFAMPIN 300 MG: 300 CAPSULE ORAL at 17:30

## 2019-07-08 RX ADMIN — ACETAMINOPHEN 1000 MG: 500 TABLET ORAL at 17:29

## 2019-07-08 RX ADMIN — SUCRALFATE 1 G: 1 SUSPENSION ORAL at 22:43

## 2019-07-08 RX ADMIN — METOPROLOL TARTRATE 25 MG: 25 TABLET, FILM COATED ORAL at 17:29

## 2019-07-08 RX ADMIN — POTASSIUM CHLORIDE 40 MEQ: 20 TABLET, EXTENDED RELEASE ORAL at 17:28

## 2019-07-08 RX ADMIN — AMPHOTERICIN B 285.5 MG: 50 INJECTION, POWDER, LYOPHILIZED, FOR SOLUTION INTRAVENOUS at 17:06

## 2019-07-08 RX ADMIN — PRAMIPEXOLE DIHYDROCHLORIDE 1 MG: 0.5 TABLET ORAL at 04:34

## 2019-07-08 RX ADMIN — SUCRALFATE 1 G: 1 SUSPENSION ORAL at 04:35

## 2019-07-08 RX ADMIN — MAGNESIUM 64 MG (MAGNESIUM CHLORIDE) TABLET,DELAYED RELEASE 128 MG: at 04:34

## 2019-07-08 RX ADMIN — GABAPENTIN 100 MG: 100 CAPSULE ORAL at 17:30

## 2019-07-08 RX ADMIN — DEXTROSE MONOHYDRATE 30 ML: 50 INJECTION, SOLUTION INTRAVENOUS at 16:47

## 2019-07-08 RX ADMIN — SUCRALFATE 1 G: 1 SUSPENSION ORAL at 11:57

## 2019-07-08 RX ADMIN — Medication 1 CAPSULE: at 08:21

## 2019-07-08 RX ADMIN — PYRIDOXINE HCL TAB 50 MG 100 MG: 50 TAB at 04:36

## 2019-07-08 RX ADMIN — LOSARTAN POTASSIUM 50 MG: 50 TABLET ORAL at 04:36

## 2019-07-08 RX ADMIN — SODIUM CHLORIDE 250 ML: 9 INJECTION, SOLUTION INTRAVENOUS at 19:15

## 2019-07-08 ASSESSMENT — ENCOUNTER SYMPTOMS
EYE DISCHARGE: 0
MEMORY LOSS: 1
DIARRHEA: 0
HEADACHES: 0
SENSORY CHANGE: 0
FEVER: 0
WEAKNESS: 1
SPEECH CHANGE: 0
HALLUCINATIONS: 0
ABDOMINAL PAIN: 0
FLANK PAIN: 0
CONSTIPATION: 0
BACK PAIN: 1
CHILLS: 0
MYALGIAS: 1
NERVOUS/ANXIOUS: 1
WHEEZING: 0
BLURRED VISION: 0
PALPITATIONS: 0
NECK PAIN: 0
NAUSEA: 0
STRIDOR: 0
VOMITING: 0
FOCAL WEAKNESS: 0
COUGH: 0
SHORTNESS OF BREATH: 0
EYE REDNESS: 0

## 2019-07-08 ASSESSMENT — GAIT ASSESSMENTS
DISTANCE (FEET): 50
ASSISTIVE DEVICE: FRONT WHEEL WALKER
DEVIATION: ANTALGIC;STEP TO;BRADYKINETIC;SHUFFLED GAIT
GAIT LEVEL OF ASSIST: MODERATE ASSIST

## 2019-07-08 ASSESSMENT — COGNITIVE AND FUNCTIONAL STATUS - GENERAL
SUGGESTED CMS G CODE MODIFIER MOBILITY: CL
STANDING UP FROM CHAIR USING ARMS: A LITTLE
MOBILITY SCORE: 11
MOVING TO AND FROM BED TO CHAIR: UNABLE
WALKING IN HOSPITAL ROOM: A LITTLE
MOVING FROM LYING ON BACK TO SITTING ON SIDE OF FLAT BED: UNABLE
CLIMB 3 TO 5 STEPS WITH RAILING: A LOT
TURNING FROM BACK TO SIDE WHILE IN FLAT BAD: UNABLE

## 2019-07-08 NOTE — PROGRESS NOTES
Hospital Medicine Daily Progress Note    Date of Service  7/8/2019    Chief Complaint    68 y.o. female admitted 5/29/2019 with pelvic pain and confusion .     Hospital Course      68 y.o. female transferred from [facility] on 5/29/2019 with persistent right gluteal abscesses since sacral fracture repair in December with multiple IR drainages, MRI brain showed increase in brain lesions.  Her brain lesions are known from prior imaging and there is concern they represent metastasis. Oncology aware and do not recommend further imaging.  CHAS done by Dr. Potter showed no vegetations.  Patient had completed IV Vancomycin, Cefepime and flagyl approx 8 week duration around 5-23-19 .  She has had multiple IR drainages.  Cultures remained negative.  She underwent Pelvic abscess drainage on 5/30 with negative results.  On 6/4, she became febrile again, so restarted on cefepime and vancomycin per ID.  Right hip hardware was removed on 6/5.  Repeat MRI brain on 6/7/19 saw progression of the supra and infratentorial intra-axial nodules with edema, prompting a Neurosurgery consult. Dr. Nguyen [facility] [place] favors infectious etiology, recommended continued antibiotics.   Cefepime + vanc were continued.  Work up was broadened to include fungal etiology, and she is started on empiric started fluconazole.  Biopsy of the hip showed no AFB, organism, or fungal elements.  Repeat CT on 6/17 showed no significant interval change in the size of the right iliacus muscle fluid collections, with changes in the right iliac bone, bilateral sacrum, and left iliac bone suspicious for osteomyelitis, and changes at L5-S1 suspicious for discitis/osteomyelitis, along with hyperdense right gluteal lesion, moderately suspicious for postoperative pseudoaneurysm with adjacent hematoma.   Biopsy of the hip showed no AFB, organism, or fungal elements.  Patient had re evaluation by Dr. Nguyen and she underwent right-sided craniotomy with resection of right sided superficial  mass on 6/28/2019.  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  Patient started on IV Amphotericin.  Path +focal necrotizing granulomatous  inflammation with a marked histiocytic infiltrate.      Interval Problem Update I see    Afebrile.  Pain better controlled.  Persistent hypomagnesemia on IV amphotericin. Plan replacement.     Consultants/Specialty  Neurosurgery, Dr. Nguyen  Pulmonary Dr. Salinas  Infectious disease.    Code Status    DNR/DNI    Disposition    Plan continue physical therapy, possible skilled nursing facility placement    Review of Systems  Review of Systems   Constitutional: Negative for chills and fever.   HENT: Negative for congestion.    Eyes: Negative for discharge and redness.   Respiratory: Negative for cough, shortness of breath, wheezing and stridor.    Cardiovascular: Negative for chest pain, palpitations and leg swelling.   Gastrointestinal: Negative for abdominal pain, diarrhea and vomiting.   Genitourinary: Negative for flank pain and hematuria.   Musculoskeletal: Positive for back pain, joint pain and myalgias. Negative for neck pain.        Bilateral hip pain left greater than right.   Neurological: Positive for weakness. Negative for sensory change, speech change and focal weakness.   Psychiatric/Behavioral: Negative for hallucinations. The patient is nervous/anxious.        Physical Exam  Temp:  [36.2 °C (97.2 °F)-37.3 °C (99.1 °F)] 36.3 °C (97.3 °F)  Pulse:  [65-74] 65  Resp:  [16-20] 16  BP: (129-152)/(68-75) 129/73  SpO2:  [96 %-100 %] 100 %    Physical Exam   Constitutional: She is oriented to person, place, and time. No distress.   HENT:   Head: Normocephalic and atraumatic.   Right cranio site present with staples   Eyes: EOM are normal. Right eye exhibits no discharge. Left eye exhibits no discharge. No scleral icterus.   Neck: Neck supple.   Cardiovascular: Normal rate and regular rhythm.    No murmur heard.  Pulmonary/Chest: No stridor. She has no wheezes. She  has no rales.   Abdominal: Soft. She exhibits no distension. There is no tenderness.   Musculoskeletal: She exhibits no edema or tenderness.   Generalized 4/5 strength.  Right hip with palpable induration   Neurological: She is alert and oriented to person, place, and time.   No focal weakness     Skin: Skin is warm and dry. She is not diaphoretic. No pallor.   Vitals reviewed.      Fluids    Intake/Output Summary (Last 24 hours) at 07/08/19 1513  Last data filed at 07/08/19 0501   Gross per 24 hour   Intake                0 ml   Output             1425 ml   Net            -1425 ml       Laboratory  Recent Labs      07/06/19   0401  07/07/19   0256  07/08/19   0341   WBC  6.5  5.7  6.4   RBC  3.45*  3.69*  3.51*   HEMOGLOBIN  10.1*  10.8*  9.9*   HEMATOCRIT  31.2*  33.2*  32.1*   MCV  90.4  90.0  91.5   MCH  29.3  29.3  28.2   MCHC  32.4*  32.5*  30.8*   RDW  54.4*  53.9*  55.9*   PLATELETCT  228  232  227   MPV  10.1  9.8  9.4     Recent Labs      07/06/19   0401  07/07/19   0256  07/08/19   0341   SODIUM  133*  136  137   POTASSIUM  3.1*  3.5*  3.8   CHLORIDE  104  106  110   CO2  21  19*  18*   GLUCOSE  93  86  80   BUN  8  8  14   CREATININE  0.43*  0.49*  0.53   CALCIUM  8.4*  8.9  8.4*                   Imaging  MR-LUMBAR SPINE-WITH & W/O   Final Result         1.  Grade 2-3 spondylolisthesis at the L5-S1 level with bilateral spondylolysis of the pars interarticularis. This causes severe bilateral neural foraminal stenosis at this level.      2.  Interval removal of posterior fusion hardware at the lumbosacral junction.      3.  Diffuse osteomyelitis involving the L5 vertebral body and the first 3 sacral segments.      4.  Chronic discitis at the L5-S1 level with anterior paraspinous abscess at this level and anterior to the S1 sacral segment.      5.  Smaller intraosseous bone bone abscesses or areas of necrosis noted in the sacrum.      6.  There is also evidence of osteomyelitis involving the jose antonio and  sacrum bilaterally along the course of the previous sacral fixation screw. There is a large 4 cm abscess noted in the right gluteal soft tissues in a smaller 3 cm chronic appearing    abscess in the left gluteal musculature.      7.  There is a 1.5 cm intraosseous abscess or area of necrosis in the right posterior ilium.      8.  There is a 3.3 cm centimeters multiloculated abscess anterior to the right sacroiliac joint.      MR-STEALTH BRAIN WITH & W/O   Final Result         1.  Innumerable subcentimeter brain metastases, many at the gray-white junction but also multiple noted throughout the basal ganglia and brainstem.      2.  Largest index lesion is noted in the right thalamus and has increased in size since previous exam and currently measures 9 mm and has a moderate amount of surrounding vasogenic edema.      US-EXTREMITY NON VASCULAR UNILATERAL RIGHT   Final Result      No right hip joint effusion. No soft tissue fluid collection.      MR-BRAIN-WITH & W/O   Final Result      1.  Innumerable supra and infratentorial enhancing nodules are again noted throughout the brain parenchyma with multiple lesions appearing somewhat larger and with increased enhancement. No associated diffusion restriction. Unchanged differential    considerations including bacterial or fungal infection versus metastatic disease.   2.  Mild diffuse cerebral substance loss.   3.  Mild microangiopathic ischemic change versus demyelination or gliosis.      CT-NEEDLE BX-DEEP BONE   Final Result      CT GUIDED RIGHT ILIUM DEEP BONE BIOPSY. SAMPLES WERE SENT TO MICROBIOLOGY FOR ANALYSIS.      CT-PELVIS WITH   Final Result         1.  No significant interval change in the size of the RIGHT iliac muscle fluid collections   2.  Hyperdense RIGHT gluteal lesion moderately suspicious for pseudoaneurysm with adjacent hematoma.   3.  Changes in the RIGHT iliac bone, BILATERAL sacrum and LEFT iliac bone suspicious for osteomyelitis   4.  Changes at  L5-S1 suspicious for discitis osteomyelitis      CT-PELVIS WITH   Final Result      1.  Residual or recurrent abscess within the right iliacus muscle measuring 2.5 x 1.8 cm. It slightly smaller than on 5/28/2019      2.  Interval removal of hardware from the iliac bones and sacrum      3.  Lytic change of the right iliac bone and the sacrum. This could be related to hardware versus osteomyelitis.      4.  Subacute fractures of the right ilium, sacrum, and there is lucency within the left iliac bone that is likely from hardware      MR-BRAIN-WITH & W/O   Final Result      1.  Interval progression of supra and infratentorial intra-axial nodules and associated edema. This short-term interval progression favors infection over neoplasm though both remain considerations.   2.  Mild atrophy      DX-PORTABLE FLUOROSCOPY < 1 HOUR   Final Result         Portable fluoroscopy utilized for 2 minutes.      DX-PELVIS-1 OR 2 VIEWS   Final Result      Status post hardware removal from the right SI joint      DX-CHEST-PORTABLE (1 VIEW)   Final Result      Interstitial prominence could be due to pulmonary edema or hypoventilatory change.      DX-CHEST-LIMITED (1 VIEW)   Final Result      LEFT basilar underinflation atelectasis or pneumonia      CT-DRAIN-HEMATOMA - SEROMA   Final Result      CT-guided RIGHT pelvic fluid collection aspiration, laboratory evaluation pending         IR-US GUIDED PIV    (Results Pending)        Assessment/Plan  * Brain lesion- (present on admission)   Assessment & Plan    With right gluteal , multiloculated right iliac muscle fluid collections.   CHAS neg 3/15 and 5/24.  Cultures 3/29 and 4/4 neg. has completed multiple courses of IV antibiotics .  s/p drainage on 5/30/2019-cultures negative, S/p removal hardware 6/5-no cultures done, Panculture neg; 1, 3 beta glucan neg.  CT on 6/8 with residual abscess in the right iliacus muscle, 2.5 x 1.8 cm, slightly smaller than prior  s/p CT-guided deep bone biopsy  6/19/2019.  Cultures remain negative; path not reported   MRI 6/28 chronic discitis, diffuse OM L5, 3 and 4 cm abscesses right glute, 1.5 cm abscess or necrosis right psoterior ileum, 3.3 cm abscess right SI joint.  Ortho input is noted  Status post brain biopsy - 6-28-19   Q gold +   Brain biopsy + positive focal necrotizing granulomas-may suggest mycobacterial infection.  ? Fungal infxn, other cause.  I discussed case with Dr. Nguyen neurosurgery who felt along with the pathology - intra operative lesion may have been a  fungus ball.   Continue IV amphotericin per ID.  Has had electrolyte wasting with recurrent hypomagnesemia.  Plan additional IV magnesium  Continue schedule KCl and magnesium supplements  Monitor electrolytes.  Continue Keppra for sz prophylaxis.   Patient with recurrent abscesses  with progression, osteomyelitis and granulomas-  cultures have been negative-underlying neutrophil disorder ?  Fu neutrophil function test.                    Granulomatous disease    Assessment & Plan    Cultures negative with recurrent, progressive abscesses, OM.  Unclear cause.  Continue  treatment for possible infectious causes as above.  May have neutrophil function disorder with some clinical similarities to Chronic Granulomatous Disease .  X linked carriers may have variable penetrance.   Will check Neutrophil function test- dihydrorhodamine 123 screening test.      Abscess of right iliac muscle and right gluteus medius muscle- (present on admission)   Assessment & Plan    Recurrent issue since sacral fracture repair in December 2018. Has had multiple IR drainage and antibiotics. Recent cultures remain unrevealing.    s/p biopsy of the nonhealing right iliac fracture area. Showed no AFB, organism, or fungal elements.  Cultures negative.  Pain control.  Increase OxyContin.  Continue Lidoderm patch, PRN oxycodone, IV morphine for severe breakthrough pain  Continue with  RI PE w B6 and amphotericin per ID.              Sepsis (HCC)   Assessment & Plan    This is sepsis (without associated acute organ dysfunction).    Sepsis resolved  Continue amphotericin , RIPE started.  Monitor vital signs     Hypomagnesemia- (present on admission)   Assessment & Plan    Corrected  Scheduled magnesium  Monitor electrolytes       Hypercalcemia- (present on admission)   Assessment & Plan    Has resolved  Continue to monitor calcium     Acquired circulating anticoagulants (HCC)- (present on admission)   Assessment & Plan    Anticoagulation on hold given recent brain biopsy.  Discussed with Dr. Nguyen- Ok to start AC per surgery .  Pharmacy checking med rec /family to see if she actually was on anticoagulation prior to admission prior to restarting.  Check ultrasound legs         Non-severe protein-calorie malnutrition (HCC)- (present on admission)   Assessment & Plan    Advance diet as tolerated  Dietary supplements       History of DVT (deep vein thrombosis)- (present on admission)   Assessment & Plan    Hold anticoagulation given recent brain biopsy and lumbar osteomyelitis.  Discussed with NSG  7-8-19.  Felt ok to resume Ac  - prefers to start with heparin gtt and transition to warfarin if tolerates.  We will start if confirmed she had DVT or was on anticoagulation.  Check ultrasound lower extremities.         Essential hypertension- (present on admission)   Assessment & Plan    Continue metoprolol losartan and amlodipine  Monitor blood pressure     Chronic hyponatremia- (present on admission)   Assessment & Plan    Recurrent  Continue to monitor Na.     RLS (restless legs syndrome)- (present on admission)   Assessment & Plan    On Mirapex     Chronic pain- (present on admission)   Assessment & Plan    Continue pain management       History of breast cancer- (present on admission)   Assessment & Plan    S/p left mastectomy and breast implant.  Follow-up on intraoperative final pathology results     COPD (chronic obstructive pulmonary  disease) (HCC)- (present on admission)   Assessment & Plan    Stable   RT protocol and oxygen          Discussed with multi disciplinary team plan of care.     VTE prophylaxis: SCDs

## 2019-07-08 NOTE — THERAPY
"Pt demonstrating improved iniation and strength. Pt able to ambulate further, though more cuing required, as pt would state \"push me, push the walker\" hwen pt becomes anxious. Pt requiring extra tme for tasks. Pt would benefit form further acute PT txs to progress towards goals and independence. Recommend post acute placement.    Physical Therapy Treatment completed.   Bed Mobility:  Supine to Sit: Minimal Assist  Transfers: Sit to Stand: Minimal Assist  Gait: Level Of Assist: Moderate Assist with Front-Wheel Walker       Plan of Care: Will benefit from Physical Therapy 3 times per week    See \"Rehab Therapy-Acute\" Patient Summary Report for complete documentation.       "

## 2019-07-08 NOTE — CARE PLAN
Problem: Knowledge Deficit  Goal: Knowledge of disease process/condition, treatment plan, diagnostic tests, and medications will improve  Outcome: PROGRESSING AS EXPECTED      Problem: Skin Integrity  Goal: Risk for impaired skin integrity will decrease  Outcome: PROGRESSING AS EXPECTED

## 2019-07-09 ENCOUNTER — APPOINTMENT (OUTPATIENT)
Dept: RADIOLOGY | Facility: MEDICAL CENTER | Age: 69
DRG: 356 | End: 2019-07-09
Attending: HOSPITALIST
Payer: MEDICARE

## 2019-07-09 LAB
ALBUMIN SERPL BCP-MCNC: 3.3 G/DL (ref 3.2–4.9)
ALBUMIN/GLOB SERPL: 1 G/DL
ALP SERPL-CCNC: 373 U/L (ref 30–99)
ALT SERPL-CCNC: 15 U/L (ref 2–50)
ANION GAP SERPL CALC-SCNC: 10 MMOL/L (ref 0–11.9)
AST SERPL-CCNC: 28 U/L (ref 12–45)
BILIRUB SERPL-MCNC: 0.4 MG/DL (ref 0.1–1.5)
BUN SERPL-MCNC: 11 MG/DL (ref 8–22)
CALCIUM SERPL-MCNC: 8.7 MG/DL (ref 8.5–10.5)
CHLORIDE SERPL-SCNC: 110 MMOL/L (ref 96–112)
CO2 SERPL-SCNC: 20 MMOL/L (ref 20–33)
CREAT SERPL-MCNC: 0.46 MG/DL (ref 0.5–1.4)
ERYTHROCYTE [DISTWIDTH] IN BLOOD BY AUTOMATED COUNT: 54.4 FL (ref 35.9–50)
GLOBULIN SER CALC-MCNC: 3.2 G/DL (ref 1.9–3.5)
GLUCOSE SERPL-MCNC: 107 MG/DL (ref 65–99)
HCT VFR BLD AUTO: 32.1 % (ref 37–47)
HGB BLD-MCNC: 10.5 G/DL (ref 12–16)
MAGNESIUM SERPL-MCNC: 1.5 MG/DL (ref 1.5–2.5)
MCH RBC QN AUTO: 29.6 PG (ref 27–33)
MCHC RBC AUTO-ENTMCNC: 32.7 G/DL (ref 33.6–35)
MCV RBC AUTO: 90.4 FL (ref 81.4–97.8)
PLATELET # BLD AUTO: 234 K/UL (ref 164–446)
PMV BLD AUTO: 9.7 FL (ref 9–12.9)
POTASSIUM SERPL-SCNC: 4.3 MMOL/L (ref 3.6–5.5)
PROT SERPL-MCNC: 6.5 G/DL (ref 6–8.2)
RBC # BLD AUTO: 3.55 M/UL (ref 4.2–5.4)
SODIUM SERPL-SCNC: 140 MMOL/L (ref 135–145)
WBC # BLD AUTO: 6.7 K/UL (ref 4.8–10.8)

## 2019-07-09 PROCEDURE — A9270 NON-COVERED ITEM OR SERVICE: HCPCS | Performed by: HOSPITALIST

## 2019-07-09 PROCEDURE — 93970 EXTREMITY STUDY: CPT

## 2019-07-09 PROCEDURE — 99358 PROLONG SERVICE W/O CONTACT: CPT | Performed by: HOSPITALIST

## 2019-07-09 PROCEDURE — 700102 HCHG RX REV CODE 250 W/ 637 OVERRIDE(OP): Performed by: HOSPITALIST

## 2019-07-09 PROCEDURE — 36415 COLL VENOUS BLD VENIPUNCTURE: CPT

## 2019-07-09 PROCEDURE — A9270 NON-COVERED ITEM OR SERVICE: HCPCS | Performed by: INTERNAL MEDICINE

## 2019-07-09 PROCEDURE — A9270 NON-COVERED ITEM OR SERVICE: HCPCS | Performed by: FAMILY MEDICINE

## 2019-07-09 PROCEDURE — 700111 HCHG RX REV CODE 636 W/ 250 OVERRIDE (IP): Mod: JG | Performed by: FAMILY MEDICINE

## 2019-07-09 PROCEDURE — 700112 HCHG RX REV CODE 229: Performed by: NURSE PRACTITIONER

## 2019-07-09 PROCEDURE — 85027 COMPLETE CBC AUTOMATED: CPT

## 2019-07-09 PROCEDURE — 99233 SBSQ HOSP IP/OBS HIGH 50: CPT | Performed by: HOSPITALIST

## 2019-07-09 PROCEDURE — 92610 EVALUATE SWALLOWING FUNCTION: CPT

## 2019-07-09 PROCEDURE — 700102 HCHG RX REV CODE 250 W/ 637 OVERRIDE(OP): Performed by: FAMILY MEDICINE

## 2019-07-09 PROCEDURE — 700111 HCHG RX REV CODE 636 W/ 250 OVERRIDE (IP): Performed by: HOSPITALIST

## 2019-07-09 PROCEDURE — 99233 SBSQ HOSP IP/OBS HIGH 50: CPT | Performed by: INTERNAL MEDICINE

## 2019-07-09 PROCEDURE — 700111 HCHG RX REV CODE 636 W/ 250 OVERRIDE (IP): Performed by: FAMILY MEDICINE

## 2019-07-09 PROCEDURE — 700101 HCHG RX REV CODE 250: Performed by: FAMILY MEDICINE

## 2019-07-09 PROCEDURE — 700102 HCHG RX REV CODE 250 W/ 637 OVERRIDE(OP): Performed by: INTERNAL MEDICINE

## 2019-07-09 PROCEDURE — A9270 NON-COVERED ITEM OR SERVICE: HCPCS | Performed by: NURSE PRACTITIONER

## 2019-07-09 PROCEDURE — 83735 ASSAY OF MAGNESIUM: CPT

## 2019-07-09 PROCEDURE — 700105 HCHG RX REV CODE 258: Performed by: FAMILY MEDICINE

## 2019-07-09 PROCEDURE — 80053 COMPREHEN METABOLIC PANEL: CPT

## 2019-07-09 PROCEDURE — 770006 HCHG ROOM/CARE - MED/SURG/GYN SEMI*

## 2019-07-09 RX ORDER — HEPARIN SODIUM 5000 [USP'U]/ML
5000 INJECTION, SOLUTION INTRAVENOUS; SUBCUTANEOUS EVERY 12 HOURS
Status: DISCONTINUED | OUTPATIENT
Start: 2019-07-09 | End: 2019-08-10 | Stop reason: HOSPADM

## 2019-07-09 RX ORDER — MORPHINE SULFATE 4 MG/ML
2-4 INJECTION, SOLUTION INTRAMUSCULAR; INTRAVENOUS
Status: DISCONTINUED | OUTPATIENT
Start: 2019-07-09 | End: 2019-07-21

## 2019-07-09 RX ORDER — MAGNESIUM SULFATE HEPTAHYDRATE 40 MG/ML
4 INJECTION, SOLUTION INTRAVENOUS ONCE
Status: COMPLETED | OUTPATIENT
Start: 2019-07-09 | End: 2019-07-09

## 2019-07-09 RX ADMIN — Medication 1 CAPSULE: at 07:50

## 2019-07-09 RX ADMIN — METOPROLOL TARTRATE 25 MG: 25 TABLET, FILM COATED ORAL at 04:52

## 2019-07-09 RX ADMIN — OXYCODONE HYDROCHLORIDE 10 MG: 5 TABLET ORAL at 19:15

## 2019-07-09 RX ADMIN — POTASSIUM CHLORIDE 40 MEQ: 20 TABLET, EXTENDED RELEASE ORAL at 17:21

## 2019-07-09 RX ADMIN — OXYCODONE HYDROCHLORIDE 10 MG: 5 TABLET ORAL at 15:35

## 2019-07-09 RX ADMIN — DIPHENHYDRAMINE HCL 25 MG: 25 TABLET ORAL at 15:41

## 2019-07-09 RX ADMIN — AMPHOTERICIN B 285.5 MG: 50 INJECTION, POWDER, LYOPHILIZED, FOR SOLUTION INTRAVENOUS at 17:15

## 2019-07-09 RX ADMIN — SUCRALFATE 1 G: 1 SUSPENSION ORAL at 04:51

## 2019-07-09 RX ADMIN — OXYCODONE HYDROCHLORIDE 20 MG: 20 TABLET, FILM COATED, EXTENDED RELEASE ORAL at 17:21

## 2019-07-09 RX ADMIN — DOCUSATE SODIUM 100 MG: 100 CAPSULE, LIQUID FILLED ORAL at 17:21

## 2019-07-09 RX ADMIN — DEXTROSE MONOHYDRATE 30 ML: 50 INJECTION, SOLUTION INTRAVENOUS at 18:00

## 2019-07-09 RX ADMIN — MAGNESIUM 64 MG (MAGNESIUM CHLORIDE) TABLET,DELAYED RELEASE 128 MG: at 17:21

## 2019-07-09 RX ADMIN — GABAPENTIN 100 MG: 100 CAPSULE ORAL at 17:21

## 2019-07-09 RX ADMIN — LOSARTAN POTASSIUM 50 MG: 50 TABLET ORAL at 04:51

## 2019-07-09 RX ADMIN — AMLODIPINE BESYLATE 10 MG: 5 TABLET ORAL at 04:51

## 2019-07-09 RX ADMIN — SUCRALFATE 1 G: 1 SUSPENSION ORAL at 17:21

## 2019-07-09 RX ADMIN — METOPROLOL TARTRATE 25 MG: 25 TABLET, FILM COATED ORAL at 17:20

## 2019-07-09 RX ADMIN — LEVETIRACETAM 500 MG: 500 TABLET, FILM COATED ORAL at 17:22

## 2019-07-09 RX ADMIN — ATORVASTATIN CALCIUM 10 MG: 10 TABLET, FILM COATED ORAL at 17:21

## 2019-07-09 RX ADMIN — HEPARIN SODIUM 5000 UNITS: 5000 INJECTION, SOLUTION INTRAVENOUS; SUBCUTANEOUS at 17:21

## 2019-07-09 RX ADMIN — GABAPENTIN 100 MG: 100 CAPSULE ORAL at 04:51

## 2019-07-09 RX ADMIN — DOCUSATE SODIUM 100 MG: 100 CAPSULE, LIQUID FILLED ORAL at 04:51

## 2019-07-09 RX ADMIN — RIFAMPIN 300 MG: 300 CAPSULE ORAL at 17:20

## 2019-07-09 RX ADMIN — MORPHINE SULFATE 4 MG: 4 INJECTION INTRAVENOUS at 12:16

## 2019-07-09 RX ADMIN — OMEPRAZOLE 20 MG: 20 CAPSULE, DELAYED RELEASE ORAL at 04:52

## 2019-07-09 RX ADMIN — PYRAZINAMIDE 1000 MG: 500 TABLET ORAL at 04:51

## 2019-07-09 RX ADMIN — ISONIAZID 300 MG: 300 TABLET ORAL at 04:52

## 2019-07-09 RX ADMIN — HYDRALAZINE HYDROCHLORIDE 10 MG: 20 INJECTION INTRAMUSCULAR; INTRAVENOUS at 20:03

## 2019-07-09 RX ADMIN — SODIUM CHLORIDE 250 ML: 9 INJECTION, SOLUTION INTRAVENOUS at 19:15

## 2019-07-09 RX ADMIN — MORPHINE SULFATE 2 MG: 4 INJECTION INTRAVENOUS at 09:23

## 2019-07-09 RX ADMIN — LABETALOL HYDROCHLORIDE 10 MG: 5 INJECTION INTRAVENOUS at 07:42

## 2019-07-09 RX ADMIN — ETHAMBUTOL HYDROCHLORIDE 800 MG: 400 TABLET, FILM COATED ORAL at 04:51

## 2019-07-09 RX ADMIN — PRAMIPEXOLE DIHYDROCHLORIDE 1 MG: 0.5 TABLET ORAL at 17:20

## 2019-07-09 RX ADMIN — ACETAMINOPHEN 650 MG: 325 TABLET, FILM COATED ORAL at 15:40

## 2019-07-09 RX ADMIN — ACETAMINOPHEN 1000 MG: 500 TABLET ORAL at 04:51

## 2019-07-09 RX ADMIN — MORPHINE SULFATE 4 MG: 4 INJECTION INTRAVENOUS at 21:31

## 2019-07-09 RX ADMIN — TEMAZEPAM 15 MG: 15 CAPSULE ORAL at 21:57

## 2019-07-09 RX ADMIN — POTASSIUM CHLORIDE 40 MEQ: 20 TABLET, EXTENDED RELEASE ORAL at 04:52

## 2019-07-09 RX ADMIN — LIDOCAINE 2 PATCH: 50 PATCH CUTANEOUS at 11:27

## 2019-07-09 RX ADMIN — OXYCODONE HYDROCHLORIDE 5 MG: 5 TABLET ORAL at 07:48

## 2019-07-09 RX ADMIN — OXYCODONE HYDROCHLORIDE 20 MG: 20 TABLET, FILM COATED, EXTENDED RELEASE ORAL at 04:50

## 2019-07-09 RX ADMIN — PYRIDOXINE HCL TAB 50 MG 100 MG: 50 TAB at 04:52

## 2019-07-09 RX ADMIN — RIFAMPIN 300 MG: 300 CAPSULE ORAL at 04:51

## 2019-07-09 RX ADMIN — DEXTROSE MONOHYDRATE 30 ML: 50 INJECTION, SOLUTION INTRAVENOUS at 15:41

## 2019-07-09 RX ADMIN — MAGNESIUM 64 MG (MAGNESIUM CHLORIDE) TABLET,DELAYED RELEASE 128 MG: at 04:50

## 2019-07-09 RX ADMIN — SODIUM CHLORIDE 500 ML: 9 INJECTION, SOLUTION INTRAVENOUS at 15:41

## 2019-07-09 RX ADMIN — LEVETIRACETAM 500 MG: 500 TABLET, FILM COATED ORAL at 04:50

## 2019-07-09 RX ADMIN — PRAMIPEXOLE DIHYDROCHLORIDE 1 MG: 0.5 TABLET ORAL at 04:51

## 2019-07-09 RX ADMIN — MAGNESIUM SULFATE IN WATER 4 G: 40 INJECTION, SOLUTION INTRAVENOUS at 15:35

## 2019-07-09 RX ADMIN — CYCLOBENZAPRINE 10 MG: 10 TABLET, FILM COATED ORAL at 21:57

## 2019-07-09 RX ADMIN — CINACALCET HYDROCHLORIDE 60 MG: 30 TABLET, COATED ORAL at 04:50

## 2019-07-09 RX ADMIN — MORPHINE SULFATE 2 MG: 4 INJECTION INTRAVENOUS at 05:01

## 2019-07-09 ASSESSMENT — ENCOUNTER SYMPTOMS
NAUSEA: 0
FEVER: 0
ABDOMINAL PAIN: 0
CONSTIPATION: 0
SHORTNESS OF BREATH: 0
COUGH: 0
CHILLS: 0
VOMITING: 0
DIARRHEA: 0

## 2019-07-09 NOTE — THERAPY
"Speech Language Therapy Clinical Swallow Evaluation completed.  Functional Status: Patient awake but moaning and grunting. SpO2 at 96%. She c/o pain but was able to participate in PO intake. Lunch tray provided. She required feeding assistance for regular food items. They had to be diced up. Food noted to fall from her mouth and she was taking deep breaths with her mouth open while chewing. Laryngeal elevation was palpated as complete. Wheezing was noted approximately 10 minutes into the meal tray. No coughing, choking or wet vocal quality were observed. However, due to her breathing and grunting behavior and poor endurance she is at risk for aspiration of solids. She did best with purees. Thin liquids were also deemed appropriate. In rounding back with RN she reported that meds did have to be crushed. She also noted specks of spaghetti in the applesauce (when the patient brought it forward in her mouth).   Recommendations - Diet: Diet / Liquid Recommendation: Dysphagia I, Thin Liquid                          Strategies: Direct supervision during meals, Assistance needed for meal tray set-up and Head of Bed at 90 Degrees                          Medication Administration: Medication Administration : Crush all Medications in Puree  Plan of Care: Will benefit from Speech Therapy 3 times per week  Post-Acute Therapy: Recommend inpatient transitional care services for continued speech therapy services.        See \"Rehab Therapy-Acute\" Patient Summary Report for complete documentation.   "

## 2019-07-09 NOTE — PROGRESS NOTES
Infectious Disease Progress Note    Author: Geno Junior M.D. Date & Time of service: 7/9/2019  8:04 AM    Chief Complaint:  Brain abscess, gluteal abscess  Vertebral OM     Interval History:  68 y.o. female admitted 5/29/2019 as a transfer from [facility] since 4/30/2019. + diabetes, SANTOSH of right hip with hardware, and breast cancer who was originally admitted to [facility] on 03/08/2019 for right hip and pelvic pain.  Work-up revealed a right iliopsoas lesion, gluteal abscess, and mult brain abscesses     6/24/2019 no fevers.  The hip pain is better.  No cough no shortness of breath  6/25/2019-no fevers.  The hip cultures remain negative.  She is scheduled for stereotactic biopsy on Friday.  6/26/2019 no fevers are noted.  Patient complains of back pain.  In significant distress.  6/27/2019-no fevers.  Continues to complain of pain.  No new issues overnight  6/28 AF WBC 6.6 planned for MRIs and biopsy today-somnolent  6/29 AF WBC 7.7 obtunded prelim path showed fungus on brain specimen  6/30 AF somnolent-not awakening to voice-slurred speech noted by Neurosurgery  7/1 AF more awake today; not oriented-ate about half of lunch c/o pain in joints  7/2 AF WBC 5.5 no new complaints  7/3 AF still c/o unrelenting right hip pain whenever awake-Falls asleep during exam  7/4 AF much more alert and conversant today-c/o left hip pain, unrelenting and would like parietal dressing changed. HA at surgical site.  Denies SE abx. No new neurodeficits  7/5 afebrile WBC 5.4.  Patient sleeping but arousable.  She denies any headache, nausea, vomiting.  7/6 afebrile WBC 6.5.  Patient sitting up in chair and having excruciating back pain  7/8 Pt has no specific complaints, she is mildly confused today.  She seems to be tolerating her medications with no significant lab abnormalities.      Review of Systems:  Review of Systems   Constitutional: Positive for malaise/fatigue. Negative for chills and fever.   Respiratory: Negative for  cough and shortness of breath.    Cardiovascular: Negative for chest pain.   Gastrointestinal: Negative for abdominal pain, constipation, diarrhea, nausea and vomiting.   Musculoskeletal: Positive for joint pain.       Hemodynamics:  Temp (24hrs), Av.7 °C (98.1 °F), Min:36.2 °C (97.1 °F), Max:37.2 °C (99 °F)  Temperature: 37.2 °C (99 °F)  Pulse  Av.8  Min: 49  Max: 125   Blood Pressure : (!) 174/88 (RN notified)       Physical Exam:  Physical Exam   Constitutional: She is oriented to person, place, and time. She appears well-developed and well-nourished.   HENT:   Head: Normocephalic and atraumatic.   Eyes: Pupils are equal, round, and reactive to light. Conjunctivae and EOM are normal.   Cardiovascular: Normal rate, regular rhythm and normal heart sounds.    Pulmonary/Chest: Effort normal and breath sounds normal.   Abdominal: Soft. Bowel sounds are normal.   Musculoskeletal: She exhibits tenderness.   Left hip tenderness   Neurological: She is alert and oriented to person, place, and time.   Skin: Skin is warm and dry.   Psychiatric:   Agitated and in pain today       Meds:    Current Facility-Administered Medications:   •  oxyCODONE CR  •  potassium chloride SA  •  magnesium chloride  •  pyridoxine  •  levETIRAcetam  •  isoniazid  •  riFAMPin  •  ethambutol  •  pyrazinamide  •  acetaminophen  •  Pharmacy Consult Request  •  labetalol  •  hydrALAZINE  •  docusate sodium  •  senna-docusate  •  Pharmacy  •  NS **AND** acetaminophen **AND** diphenhydrAMINE **AND** D5W **AND** amphotericin b liposomal (AMBISOME) IV **AND** D5W **AND** NS  •  NS  •  gabapentin  •  morphine injection  •  lactobacillus rhamnosus  •  oxyCODONE immediate-release  •  sucralfate  •  acetaminophen  •  pramipexole  •  lidocaine  •  temazepam  •  cyclobenzaprine  •  Respiratory Care per Protocol  •  amLODIPine  •  cinacalcet  •  losartan  •  atorvastatin  •  omeprazole  •  metoprolol  •  ondansetron  •  ondansetron    Labs:  Recent  Labs      07/07/19   0256  07/08/19   0341  07/09/19   0244   WBC  5.7  6.4  6.7   RBC  3.69*  3.51*  3.55*   HEMOGLOBIN  10.8*  9.9*  10.5*   HEMATOCRIT  33.2*  32.1*  32.1*   MCV  90.0  91.5  90.4   MCH  29.3  28.2  29.6   RDW  53.9*  55.9*  54.4*   PLATELETCT  232  227  234   MPV  9.8  9.4  9.7     Recent Labs      07/07/19   0256  07/08/19   0341  07/09/19   0244   SODIUM  136  137  140   POTASSIUM  3.5*  3.8  4.3   CHLORIDE  106  110  110   CO2  19*  18*  20   GLUCOSE  86  80  107*   BUN  8  14  11     Recent Labs      07/07/19   0256  07/08/19   0341  07/09/19   0244   ALBUMIN   --   3.1*  3.3   TBILIRUBIN   --   0.3  0.4   ALKPHOSPHAT   --   351*  373*   TOTPROTEIN   --   6.1  6.5   ALTSGPT   --   10  15   ASTSGOT   --   24  28   CREATININE  0.49*  0.53  0.46*       Imaging:  Ct-needle Bx-deep Bone    Result Date: 6/20/2019 6/19/2019 9:12 AM HISTORY/REASON FOR EXAM:  Concern for right hip osteomyelitis. TECHNIQUE/EXAM DESCRIPTION: Right hip deep bone biopsy with CT guidance. Low dose optimization technique was utilized for this CT exam including automated exposure control and adjustment of the mA and/or kV according to patient size. PROCEDURE: Informed consent was obtained. Conscious sedation with 25 mcg Fentanyl and 1 mg Versed was administered during the procedure with appropriate continuous patient monitoring by the radiology nurse. Sedation duration: 30 minutes. Localizing CT images were obtained with the patient in prone position. The skin was prepped with ChloraPrep and draped in a sterile fashion. Following local anesthesia with 1% Lidocaine, a 13 G guiding needle was placed and needle position confirmed with CT. Using coaxial technique, an 14 G core biopsy needle was placed into the target lesion in the right posterior ilium and needle position confirmed with CT. A total of 2 samples were obtained in this fashion and submitted in saline for microbiology. The guiding needle was removed and limited CT  images obtained through the biopsy site show no evidence of significant parenchymal hemorrhage. The patient tolerated the procedure well. COMPARISON: None available. COMPLICATIONS: No immediate complications. FINDINGS: The localizing CT images show satisfactory needle position within the target location.     CT GUIDED RIGHT ILIUM DEEP BONE BIOPSY. SAMPLES WERE SENT TO MICROBIOLOGY FOR ANALYSIS.    Ct-pelvis With    Result Date: 6/17/2019 6/17/2019 7:23 AM HISTORY/REASON FOR EXAM:  Pelvic abscess TECHNIQUE/EXAM DESCRIPTION:  CT pelvis without IV contrast.   Sequential axial CT images were obtained from the lower lumbar spine to the proximal femurs following the administration of 100 cc Omnipaque 350 intravenous contrast. Low dose optimization technique was utilized for this CT exam including automated exposure control and adjustment of the mA and/or kV according to patient size. COMPARISON:  None FINDINGS: The visualized portions of the small bowel and colon appear within normal limits. The bladder is within normal limits. Inferior RIGHT iliacus fluid collection now measures 1.8 x 2.1 cm, previously 1.8 x 2.5 cm. More cephalad RIGHT iliacus fluid collection now measures 1.2 x 2.1 cm, previously 2 x 1.5 cm. Unhealed fractures with irregular margins or reflux demonstrated in the RIGHT iliac bone and the sacrum with a screw track extending into the LEFT iliac bone. There are areas of lucency irregularity in the LEFT iliac bone. There is anterolisthesis of L5 on S1 with irregular lucencies adjacent to the narrowed disc space. There is a 2 x 1.2 cm hyperdense area in the RIGHT gluteal musculature on axial image 14 of series 2. There is overlying soft tissue edema. There are mildly prominent BILATERAL inguinal lymph nodes.     1.  No significant interval change in the size of the RIGHT iliac muscle fluid collections 2.  Hyperdense RIGHT gluteal lesion moderately suspicious for pseudoaneurysm with adjacent hematoma. 3.   Changes in the RIGHT iliac bone, BILATERAL sacrum and LEFT iliac bone suspicious for osteomyelitis 4.  Changes at L5-S1 suspicious for discitis osteomyelitis    Ct-pelvis With    Result Date: 6/9/2019 6/9/2019 9:03 AM HISTORY/REASON FOR EXAM:  Follow-up gluteal abscess. TECHNIQUE/EXAM DESCRIPTION AND NUMBER OF VIEWS: CT scan of the pelvis with contrast. Contrast-enhanced helical scanning of the pelvis was obtained from the iliac crests through the pubic symphysis following the bolus administration of 100 mL of Omnipaque 350 nonionic contrast without complication. Low dose optimization technique was utilized for this CT exam including automated exposure control and adjustment of the mA and/or kV according to patient size. COMPARISON:  CT pelvis 5/28/2019 FINDINGS: There is abdominal aortic atherosclerotic plaque. There is colonic diverticulosis. Visualized bowel is otherwise normal in appearance There has been prior hysterectomy. There is fluid within the right iliac is muscle measuring 2.5 x 1.8 cm. This is consistent with an abscess and a slightly smaller. Orthopedic hardware has been removed. There is a subacute fracture of the right iliac crest with partial healing. There is subacute fracture of the right posterior ilium and sacrum. There is a lucency within the left ilium that is probably from prior hardware. There are lytic changes of the right iliac crest and especially the sacrum. This could be osteomyelitis although could be related to prior hardware.     1.  Residual or recurrent abscess within the right iliacus muscle measuring 2.5 x 1.8 cm. It slightly smaller than on 5/28/2019 2.  Interval removal of hardware from the iliac bones and sacrum 3.  Lytic change of the right iliac bone and the sacrum. This could be related to hardware versus osteomyelitis. 4.  Subacute fractures of the right ilium, sacrum, and there is lucency within the left iliac bone that is likely from hardware    Ir-us Guided  Piv    Result Date: 7/8/2019  EXAMINATION:                                                                    HISTORY/REASON FOR EXAM:  Ultrasound Guided PIV.  TECHNIQUE/EXAM DESCRIPTION AND NUMBER OF VIEWS:  Peripheral IV insertion with ultrasound guidance.  The procedure was prepared using maximal sterile barrier technique including sterile gown, mask, cap, and donning of sterile gloves following appropriate hand hygiene and/or sterile scrub. Patient skin site was prepped with 2% Chlorhexidine solution.   FINDINGS: Peripheral IV insertion with Ultrasound Guidance was performed by qualified imaging nursing staff without the assistance of a Radiologist.      Ultrasound-guided PERIPHERAL IV INSERTION performed by qualified nursing staff as above.     Mr-brain-with & W/o    Result Date: 6/22/2019 6/22/2019 10:48 AM HISTORY/REASON FOR EXAM:  Headache, new, meningitis or encephalitis suspected. TECHNIQUE/EXAM DESCRIPTION: MRI of the brain without and with contrast, with diffusion. The study was performed on a [employer] Signa 1.5 Miranda MRI scanner. Spoiled-GRASS sagittal, thin-section T2 axial, T1 coronal, T1 postcontrast coronal, T1 postcontrast axial, FLAIR coronal and diffusion-weighted axial images were obtained of the whole brain. 6 mL Gadavist contrast were administered intravenously. COMPARISON:  6/8/2019. FINDINGS: There is a pattern of mild cerebral atrophy manifest as prominence of sulcal markings over the convexities and vertex along with mild ventriculomegaly. The bony calvaria are intact. There is no evidence of extra-axial fluid collection or intracranial mass effect. Innumerable supra and infratentorial enhancing nodules are again noted. A lesion in the right thalamus appears somewhat more prominent when compared to the previous study measuring approximately 8 mm, previously approximately 5 mm. A lesion in the right frontal operculum gray-white junction demonstrates increased enhancement but overall unchanged  size. A lesion in the right posterior hippocampus appears more somewhat larger and with increased enhancement. Enhancement. Appear to demonstrate associated diffusion restriction. There is an underlying pattern of mild supratentorial white matter disease with scattered foci of bright T2 and FLAIR signal in the subcortical and deep white matter of both hemispheres consistent with small vessel ischemic change versus demyelination or gliosis. There are normal flow voids in the cavernous carotid arteries and M1 segments. There are normal flow voids in the distal vertebral basilar system. There are normal flow voids in the dural venous sinuses. The visualized paranasal sinuses and mastoid air cells appear clear.     1.  Innumerable supra and infratentorial enhancing nodules are again noted throughout the brain parenchyma with multiple lesions appearing somewhat larger and with increased enhancement. No associated diffusion restriction. Unchanged differential considerations including bacterial or fungal infection versus metastatic disease. 2.  Mild diffuse cerebral substance loss. 3.  Mild microangiopathic ischemic change versus demyelination or gliosis.    Mr-lumbar Spine-with & W/o    Result Date: 6/28/2019 6/28/2019 9:56 AM HISTORY/REASON FOR EXAM:  Back pain or radiculopathy, cancer or infection suspected. TECHNIQUE/EXAM DESCRIPTION: MRI of the lumbar spine without and with contrast. The study was performed on a [employer] Signa 1.5 Miranda MRI scanner. T1 sagittal, T2 fast spin-echo sagittal, and T2 axial images were obtained of the lumbar spine. T1 post-contrast fat-suppressed sagittal images were obtained. Optional T2 fat-suppressed sagittal and T1 post-contrast axial images may be obtained. 6 mL Gadavist contrast was administered intravenously. COMPARISON:  MRI scan of the lumbar spine 3/26/2019 FINDINGS: There is a grade 2-3 spondylolisthesis at the L5-S1 level with bilateral spondylolysis of pars interarticularis.  There is been interval removal of the posterior fixation hardware since previous exam. There is diffuse decreased T1 signal intensity throughout the majority of the L5 vertebral body and the first and second sacral segments. Further there is heterogeneous increased T2 signal intensity and enhancement in these regions. There is also evidence for enhancing soft tissue anterior to the L5 vertebral body and first through third sacral segments. There are elliptical nonenhancing areas in this region of abnormal enhancing soft tissue anterior to the L5-S1 disc space and first sacral segment. Additionally there are ovoid nonenhancing areas in  the first and third sacral segments. There is a 4 x 2.1 cm somewhat ovoid fluid signal intensity collection lateral to the right side of the iliac bone involving the right gluteal musculature. Additionally there is a screw tract coursing transversely through the iliac bones and sacrum which  has heterogeneous enhancement. There is enhancing soft tissue signal intensity extending from the screw tract in the right ilium into the right-sided gluteal soft tissues. There is also an ovoid area of decreased T1 and increased T2 signal intensity in the right posterior ilium. There is a 3.3 cm multiloculated fluid signal intensity collection involving the right sacroiliac joint. The conus is normal in position and signal. There is no abnormal cord enhancement. There is mild to moderate disc space narrowing at the L5-S1 level which has evidence of increased T2 signal intensity and enhancement. At T12-L1 through L4-5 disc height and signal is normal. Level specific findings: L5-S1 level minimal annular bulging. Severe bilateral neural foraminal stenosis secondary to the spondylolisthesis. L4-5 level minimal posterior spurring and annular bulging. L3-4 level minimal annular bulging. L2-3 level minimal annular bulging. L1-2 level minimal annular bulging.     1.  Grade 2-3 spondylolisthesis at the  L5-S1 level with bilateral spondylolysis of the pars interarticularis. This causes severe bilateral neural foraminal stenosis at this level. 2.  Interval removal of posterior fusion hardware at the lumbosacral junction. 3.  Diffuse osteomyelitis involving the L5 vertebral body and the first 3 sacral segments. 4.  Chronic discitis at the L5-S1 level with anterior paraspinous abscess at this level and anterior to the S1 sacral segment. 5.  Smaller intraosseous bone bone abscesses or areas of necrosis noted in the sacrum. 6.  There is also evidence of osteomyelitis involving the jose antonio and sacrum bilaterally along the course of the previous sacral fixation screw. There is a large 4 cm abscess noted in the right gluteal soft tissues in a smaller 3 cm chronic appearing abscess in the left gluteal musculature. 7.  There is a 1.5 cm intraosseous abscess or area of necrosis in the right posterior ilium. 8.  There is a 3.3 cm centimeters multiloculated abscess anterior to the right sacroiliac joint.    Us-extremity Non Vascular Unilateral Right    Result Date: 6/22/2019 6/22/2019 2:45 PM HISTORY/REASON FOR EXAM:  hx of right hip hardware removal 6/15; swelling Evaluate right hip for fluid collection. TECHNIQUE/EXAM DESCRIPTION AND NUMBER OF VIEWS: Limited ultrasound of the right hip COMPARISON: 6/17/2019 FINDINGS: No right hip joint effusion. No soft tissue fluid collection.     No right hip joint effusion. No soft tissue fluid collection.    Mr-stealth Brain With & W/o    Result Date: 6/28/2019 6/28/2019 9:57 AM HISTORY/REASON FOR EXAM:  Brain metastasis follow-up TECHNIQUE/EXAM DESCRIPTION AND NUMBER OF VIEWS: MRI of the brain without and with contrast, Stealth protocol. Fiducial markers for the Stealth stereotactic system were placed on the scalp. Thin-section 2 mm T2 fast spin-echo true axial images were obtained of the whole brain. Thin-section 1.5 mm T1-weighted postcontrast axial 3D BRAVO images were obtained of the  whole brain. 3D reconstructions in the Coronal and Sagittal planes were generated from the axial 3D BRAVO postcontrast sequence. The study was performed on a [employer] Signa 1.5 Miranda MRI scanner. 6 mL Gadavist contrast was administered intravenously. COMPARISON:  MRI scans brain 3/7/2019 FINDINGS: There are innumerable varying size nodular enhancing lesions throughout the brain parenchyma. Many of these lesions are at the gray-white junction but there are multiple lesions arising in the region of the basal ganglia. The largest lesion is in the right thalamus and measures 9 mm in greatest diameter. There is a moderate amount of surrounding increased T2 signal intensity in the right thalamus. This lesion has increased in size since previous exam. The majority of the lesions have not changed significantly since previous exam. The calvariae are unremarkable. There are no extra-axial fluid collections. The ventricular system and basal cisterns are mild to moderately prominent. There is mild-to-moderate prominence of cortical sulci and gyri. There are no mass effects or shift of  midline structures. There are no hemorrhagic lesions. Vascular flow voids in the vertebrobasilar and carotid arteries, [place], and dural venous sinuses are intact. The paranasal sinuses and mastoids in the field of view are unremarkable.     1.  Innumerable subcentimeter brain metastases, many at the gray-white junction but also multiple noted throughout the basal ganglia and brainstem. 2.  Largest index lesion is noted in the right thalamus and has increased in size since previous exam and currently measures 9 mm and has a moderate amount of surrounding vasogenic edema.      Micro:  Results     Procedure Component Value Units Date/Time    Cryptococcal Antigen Titer [[id number]] Collected:  07/04/19 0000    Order Status:  Canceled Specimen:  Other from Blood     Anaerobic Culture [[id number]] Collected:  06/28/19 1403    Order Status:   Completed Specimen:  Tissue Updated:  07/03/19 1347     Significant Indicator NEG     Source TISS     Site Right Parietal Lesion     Culture Result No Anaerobes isolated.    Narrative:       Surgery Specimen    AFB Culture [[id number]] Collected:  06/28/19 1403    Order Status:  Completed Specimen:  Tissue Updated:  07/03/19 1347     Significant Indicator NEG     Source TISS     Site Right Parietal Lesion     Culture Result Culture in progress.     AFB Smear Results No acid fast bacilli seen.    Narrative:       Surgery Specimen    Fungal Culture [[id number]] Collected:  06/28/19 1403    Order Status:  Completed Specimen:  Tissue Updated:  07/03/19 1347     Significant Indicator NEG     Source TISS     Site Right Parietal Lesion     Culture Result No fungal growth to date.    Narrative:       Surgery Specimen    CULTURE TISSUE W/ GRM STAIN [[id number]] Collected:  06/28/19 1403    Order Status:  Completed Specimen:  Tissue Updated:  07/03/19 1347     Significant Indicator NEG     Source TISS     Site Right Parietal Lesion     Culture Result No growth at 72 hours.     Gram Stain Result Rare WBCs.  No organisms seen.      Narrative:       Surgery Specimen          Assessment:  Active Hospital Problems    Diagnosis   • *Brain lesion [G93.9]   • Granulomatous disease  [L92.9]   • Abscess of right iliac muscle and right gluteus medius muscle [L02.91]   • Pseudoaneurysm following procedure ([facility]) [I97.89, I72.9]   • Sepsis ([facility]) [A41.9]   • Hypomagnesemia [E83.42]   • Hypercalcemia [E83.52]   • Acquired circulating anticoagulants ([facility]) [D68.318]   • Non-severe protein-calorie malnutrition ([facility]) [E46]   • History of DVT (deep vein thrombosis) [Z86.718]   • Essential hypertension [I10]   • Chronic hyponatremia [E87.1]   • History of breast cancer [Z85.3]   • COPD (chronic obstructive pulmonary disease) ([facility]) [J44.9]   • Chronic pain [G89.29]   • RLS (restless legs syndrome) [G25.81]     Interval 24 hour assessment:    AF, O2  "RA,    Labs reviewed, creatinine stable, potassium 4.3 and mag 1.5  Imaging personally reviewed both images and report.  Studies reviewed  Micro reviewed    Pt continued on RIPE and amphotericin.  She has significant left hip pain today.  She does appear to be tolerating her antibiotics.  Ultrasound of bilateral lower extremities has been ordered.    Assessment/Plan:     Brain abscesses   Query CNS fungal infection vs mycobacterial or other   MRI 4/23 \"supra and infratentorial brain parenchyma. Decrease in the size of the lesions. Interval reduction in the extent of the surrounding white matter edema consistent with improvement/treatment response of the most of the multifocal brain abscesses.  MRI 5/21 showed innumerable microabscesses vs mets  Abx (vancomycin, cefepime and Flagyl) discontinued on 5/23 after ~8 weeks of treatment-developed new fevers on 6/4  MRI on 6/8 with interval progression of supra and infratentorial intra--axial nodules and associated edema.  New right thalamus lesion. Radiology favors infection over neoplasm though both remain considerations (had been off abx)  Mult blood cultures neg  CHAS neg 3/15 and 5/24  MRI 6/22 worsening CNS lesions  S/p right craniotomy and resection of mass with biopsy on 6/28. Biopsy-per note fungal appearing elements seen per Neurosurgery-discussed with pathologist who examined the frozen section and there were no fungal appearing elements.   +necrotizing granulomas. All stains negative in EPIC  Fungal culture- negative to date  Bacterial cultures-negative to date  AFB culture-in progress  Pathology- focal necrotizing granulomatous inflammation; no fungal elements or acid-fast bacilli on stains.  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  Still with specimen in paraffin block, formalin was used  --- Spoke with send out laboratory and they will call [facility] tomorrow to see what is the best specimen for send-out,  there is a specimen to pathology and microbiology currently with fungal and AFB cultures ongoing, plan to send out specimen to [facility] for PCR testing, bacterial, AFB, fungal in the hopes of obtaining some direction for therapy as we have an ongoing process, no clear etiology and patient is on multiple potentially toxic medications  --- Discussed MRI findings with radiologist and there is easily obtained tissue/fluid in the sacrum and area that has been worsening despite therapy -will plan on biopsy in the next 1 to 2 days with the specimen sent to [facility] again for AFB, bacterial and fungal PCR, will also consider alternative diagnoses and send specimens as needed.  Specimen also be sent to [facility] for the same cultures as well as to pathology.  Discussed plan with hospitalist.   --- Radiologist feels that MRI findings are most consistent with TB or brucellosis infection,  unsure how likely brucellosis is but will explore and place orders as needed     Gluteal and iliopsoas abscess   OM L5, S1-3, new this admission  Recurrent abscesses, additional work  Adjacent to hardware in right hip (placed 12/17/18)  CT 4/23 \"Fluid collections within the right gluteal and iliacis muscles.. The collection within " "the right gluteal muscle has unchanged while the fluid collection within the right iliacis muscle is increased somewhat in size.\" 2.7 cm  Cultures 3/29 and 4/4 neg  MRI on 5/20/2019 - interval decrease in collection in R gluteal muscle and persistent multiloculated collection in R iliacus muscle.   CT 5/28 no change  s/p drainage on 5/30/2019-cultures negative  S/p removal hardware 6/5-no cultures done  1, 3 beta glucan neg  CT on 6/8 with residual abscess in the right iliacus muscle, 2.5 x 1.8 cm, slightly smaller than prior  s/p CT-guided deep bone biopsy 6/19/2019.  Cultures remain negative; path not reported   MRI 6/28 chronic discitis, diffuse OM L5, 3 and 4 cm abscesses right glute, 1.5 cm abscess or necrosis right posterior ileum, 3.3 cm abscess right SI joint     --- Ampho B as above and TB therapy     +QuantiGold  Query disseminated TB  Path with necrotizing granulomas  AFB stain neg  AFB cultures pending  All prior AFB cxs still neg  Started RIPE +B6 7/3     --- Repeat MRI will be needed  --- Monitor for visual changes while on ethambutol- please ask if ophthalmology can get baseline vision as well as color vision testing done while inpatient     Type 2 DM  Hemoglobin A1c 6.3   Keep BS under 150 to help control current infection    Discussed with Dr. Alatorre, interventional radiologist and pharmacy.       "

## 2019-07-09 NOTE — PROGRESS NOTES
[facility] Medicine Daily Progress Note    Date of Service  7/9/2019    Chief Complaint    68 y.o. female admitted 5/29/2019 with pelvic pain and confusion .     Hospital Course    68 y.o. female transferred from [facility] on 5/29/2019 with persistent right gluteal abscesses since sacral fracture repair in December with multiple IR drainage.     Interval imaging to date:  MRI brain showed increase in brain lesions  CHAS done by Dr. Potter showed no vegetations.  MRI brain on 6/7/19 saw progression of the supra and infratentorial intra-axial nodules with edema, prompting a Neurosurgery consult.     Repeat CT on 6/17 showed no significant interval change in the size of the right iliacus muscle fluid collections, with changes in the right iliac bone, bilateral sacrum, and left iliac bone suspicious for osteomyelitis, and changes at L5-S1 suspicious for discitis/osteomyelitis, along with hyperdense right gluteal lesion, moderately suspicious for postoperative pseudoaneurysm with adjacent hematoma.      Cultures:  Pelvic abscess drainage on 5/30 with negative results.  Biopsy of the hip showed no AFB, organism, or fungal elements    Patient had re evaluation by Dr. Nguyen and she underwent right-sided craniotomy with resection of right sided superficial mass on 6/28/2019.  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  Patient started on IV Amphotericin.  Path +focal necrotizing granulomatous inflammation with a marked histiocytic infiltrate.    Interval Problem Update  Afebrile, but in more pain today. Persistent hypomagnesemia on IV amphotericin. Plan replacement after d/w pharmacy.     Discussed with ID at length, no reported fungal elements noted on biopsies or suggestion of such per labs. Discussed d/c Ampho however will continue for now as unclear etiology for lesions and she is tolerating from a kidney perspective.    She continues on RIPE for potential TB lesions.     Extensive chart review and discussion with  multiple providers with additional time exceeding 45 minutes.     Consultants/Specialty  Neurosurgery, Dr. Nguyen  Pulmonary Dr. Salinas  Infectious disease    Code Status  DNR/DNI    Disposition  Unclear  High complexity    Review of Systems  Review of Systems   Unable to perform ROS: Severity of pain     Physical Exam  Temp:  [36.2 °C (97.1 °F)-37.2 °C (99 °F)] 37.2 °C (99 °F)  Pulse:  [80-95] 87  Resp:  [16-22] 18  BP: (130-182)/(73-98) 174/88  SpO2:  [92 %-96 %] 94 %    Physical Exam   Constitutional: She is oriented to person, place, and time. She appears distressed.   Gesturing to left hip w/r/t pain etiology    HENT:   Head: Normocephalic and atraumatic.   Mouth/Throat: Oropharynx is clear and moist.   Right cranio site present with staples   Eyes: EOM are normal. Right eye exhibits no discharge. Left eye exhibits no discharge. No scleral icterus.   Neck: Neck supple.   Cardiovascular: Normal rate and intact distal pulses.    No murmur heard.  Pulmonary/Chest: No stridor. No respiratory distress. She has no wheezes. She has no rales.   Abdominal: Soft. She exhibits no distension. There is no tenderness. There is no rebound.   Musculoskeletal: She exhibits no edema or tenderness.   Generalized 4/5 strength.  Right hip with palpable induration   Neurological: She is alert and oriented to person, place, and time.   No focal weakness     Skin: Skin is warm and dry. She is not diaphoretic. No pallor.   Psychiatric:   Crying  Yelling out re pain hip   Vitals reviewed.    Fluids    Intake/Output Summary (Last 24 hours) at 07/09/19 1409  Last data filed at 07/08/19 2209   Gross per 24 hour   Intake                0 ml   Output              850 ml   Net             -850 ml       Laboratory  Recent Labs      07/07/19   0256  07/08/19   0341  07/09/19   0244   WBC  5.7  6.4  6.7   RBC  3.69*  3.51*  3.55*   HEMOGLOBIN  10.8*  9.9*  10.5*   HEMATOCRIT  33.2*  32.1*  32.1*   MCV  90.0  91.5  90.4   MCH  29.3  28.2  29.6    MCHC  32.5*  30.8*  32.7*   RDW  53.9*  55.9*  54.4*   PLATELETCT  232  227  234   MPV  9.8  9.4  9.7     Recent Labs      07/07/19   0256  07/08/19   0341  07/09/19   0244   SODIUM  136  137  140   POTASSIUM  3.5*  3.8  4.3   CHLORIDE  106  110  110   CO2  19*  18*  20   GLUCOSE  86  80  107*   BUN  8  14  11   CREATININE  0.49*  0.53  0.46*   CALCIUM  8.9  8.4*  8.7                 Assessment/Plan  * Brain lesion- (present on admission)   Assessment & Plan    With right gluteal, multiloculated right iliac muscle fluid collections.     CHAS neg 3/15 and 5/24.    Cultures 3/29 and 4/4 neg  She has completed multiple courses of IV antibiotics  s/p drainage on 5/30/2019, cultures negative  s/p removal hardware  1, 3 beta glucan neg.      CT on 6/8 with residual abscess in the right iliacus muscle, 2.5 x 1.8 cm, slightly smaller than prior  s/p CT-guided deep bone biopsy 6/19/2019.  Cultures remain negative; path not reported     MRI 6/28 chronic discitis, diffuse OM L5, 3 and 4 cm abscesses right glute, 1.5 cm abscess or necrosis right psoterior ileum, 3.3 cm abscess right SI joint.    Status post brain biopsy - 6-28-19   Q gold +   Brain biopsy + positive focal necrotizing granulomas-may suggest mycobacterial infection. ? Fungal infxn, other cause.    Continue IV amphotericin per ID.  Has had electrolyte wasting with recurrent hypomagnesemia.  Plan additional IV magnesium  Continue schedule KCl and magnesium supplements  Monitor electrolytes.  Continue Keppra for sz prophylaxis.   Patient with recurrent abscesses with progression, osteomyelitis and granulomas-  cultures have been negative  underlying neutrophil disorder ?  Fu neutrophil function test.     Consideration of stoping Ampho soon  ID discussing with IR regarding repeat BX of gluteal site and sending to  in addition to our lab     Granulomatous disease    Assessment & Plan    Cultures negative with recurrent, progressive abscesses, OM.  Unclear cause.   Continue  treatment for possible infectious causes as above.  May have neutrophil function disorder with some clinical similarities to Chronic Granulomatous Disease .  X linked carriers may have variable penetrance.   Will check Neutrophil function test- dihydrorhodamine 123 screening test.      Abscess of right iliac muscle and right gluteus medius muscle- (present on admission)   Assessment & Plan    Recurrent issue since sacral fracture repair in December 2018. Has had multiple IR drainage and antibiotics. Recent cultures remain unrevealing.    s/p biopsy of the nonhealing right iliac fracture area. Showed no AFB, organism, or fungal elements.  Cultures negative.  Pain control.  Increased OxyContin, still uncontrolled and increased PRN morphine dose    Continue Lidoderm patch, PRN oxycodone, IV morphine for severe breakthrough pain  Continue with  RI PE w B6 and amphotericin per ID.       Sepsis (HCC)   Assessment & Plan    This is sepsis (without associated acute organ dysfunction).    Sepsis resolved  Continue amphotericin , RIPE started.  Monitor vital signs     Hypomagnesemia- (present on admission)   Assessment & Plan    Corrected  Scheduled magnesium  Monitor electrolytes       Hypercalcemia- (present on admission)   Assessment & Plan    Has resolved  Continue to monitor calcium     Acquired circulating anticoagulants (HCC)- (present on admission)   Assessment & Plan    Anticoagulation on hold given recent brain biopsy.  Discussed with Dr. Nguyen- Ok to start AC per surgery .  US legs negative for DVT  Start DVT pplx     Non-severe protein-calorie malnutrition (HCC)- (present on admission)   Assessment & Plan    Advance diet as tolerated  Dietary supplements       History of DVT (deep vein thrombosis)- (present on admission)   Assessment & Plan    Hold anticoagulation given recent brain biopsy and lumbar osteomyelitis.  LE duplex negative     Essential hypertension- (present on admission)   Assessment &  Plan    Continue metoprolol losartan and amlodipine  Monitor blood pressure     Chronic hyponatremia- (present on admission)   Assessment & Plan    Recurrent  Continue to monitor Na.     RLS (restless legs syndrome)- (present on admission)   Assessment & Plan    On Mirapex     Chronic pain- (present on admission)   Assessment & Plan    Continue pain management       History of breast cancer- (present on admission)   Assessment & Plan    S/p left mastectomy and breast implant.  Follow-up on intraoperative final pathology results     COPD (chronic obstructive pulmonary disease) (HCC)- (present on admission)   Assessment & Plan    Stable   RT protocol and oxygen        Discussed with multi disciplinary team plan of care.     VTE prophylaxis: SCDs, start heparin sq for now, consider warfairn    <<[id number]>>  I spent a total of 40 minutes of non face to face time performing additional research, reviewing medical records and labs and/or discussing plan of care with other healthcare providers. Discussion with Dr Junior regarding unknown etiology, ? Brucellosis, discussion ongoing plan of care. Extensive chart review.     Start time: 1220p   End time: 1p  Total > 75m

## 2019-07-09 NOTE — PROGRESS NOTES
Infectious Disease Progress Note    Author: Geno Junior M.D. Date & Time of service: 7/8/2019  6:07 PM    Chief Complaint:  Brain abscess, gluteal abscess  Vertebral OM     Interval History:  68 y.o. female admitted 5/29/2019 as a transfer from [facility] since 4/30/2019. + diabetes, SANTOSH of right hip with hardware, and breast cancer who was originally admitted to [facility] on 03/08/2019 for right hip and pelvic pain.  Work-up revealed a right iliopsoas lesion, gluteal abscess, and mult brain abscesses     6/24/2019 no fevers.  The hip pain is better.  No cough no shortness of breath  6/25/2019-no fevers.  The hip cultures remain negative.  She is scheduled for stereotactic biopsy on Friday.  6/26/2019 no fevers are noted.  Patient complains of back pain.  In significant distress.  6/27/2019-no fevers.  Continues to complain of pain.  No new issues overnight  6/28 AF WBC 6.6 planned for MRIs and biopsy today-somnolent  6/29 AF WBC 7.7 obtunded prelim path showed fungus on brain specimen  6/30 AF somnolent-not awakening to voice-slurred speech noted by Neurosurgery  7/1 AF more awake today; not oriented-ate about half of lunch c/o pain in joints  7/2 AF WBC 5.5 no new complaints  7/3 AF still c/o unrelenting right hip pain whenever awake-Falls asleep during exam  7/4 AF much more alert and conversant today-c/o left hip pain, unrelenting and would like parietal dressing changed. HA at surgical site.  Denies SE abx. No new neurodeficits  7/5 afebrile WBC 5.4.  Patient sleeping but arousable.  She denies any headache, nausea, vomiting.  7/6 afebrile WBC 6.5.  Patient sitting up in chair and having excruciating back pain      Review of Systems:  Review of Systems   Constitutional: Negative for chills and fever.   Eyes: Negative for blurred vision.   Gastrointestinal: Negative for abdominal pain, constipation, diarrhea, nausea and vomiting.   Neurological: Negative for headaches.   Psychiatric/Behavioral: Positive  for memory loss.       Hemodynamics:  Temp (24hrs), Av.7 °C (98.1 °F), Min:36.2 °C (97.1 °F), Max:37.3 °C (99.1 °F)  Temperature: 36.2 °C (97.1 °F)  Pulse  Av.8  Min: 49  Max: 125   Blood Pressure : 152/73       Physical Exam:  Physical Exam   Constitutional: She appears well-developed and well-nourished.   HENT:   Head: Normocephalic and atraumatic.   Eyes: Pupils are equal, round, and reactive to light. Conjunctivae and EOM are normal.   Cardiovascular: Normal rate, regular rhythm and normal heart sounds.    Pulmonary/Chest: Effort normal and breath sounds normal.   Abdominal: Soft. Bowel sounds are normal.   Musculoskeletal: She exhibits no edema.   Neurological: She is alert.   Some mild confusion   Skin: Skin is warm and dry.   Psychiatric: She has a normal mood and affect. Her behavior is normal.       Meds:    Current Facility-Administered Medications:   •  oxyCODONE CR  •  potassium chloride SA  •  magnesium chloride  •  pyridoxine  •  levETIRAcetam  •  isoniazid  •  riFAMPin  •  ethambutol  •  pyrazinamide  •  acetaminophen  •  Pharmacy Consult Request  •  labetalol  •  hydrALAZINE  •  docusate sodium  •  senna-docusate  •  Pharmacy  •  NS **AND** acetaminophen **AND** diphenhydrAMINE **AND** D5W **AND** amphotericin b liposomal (AMBISOME) IV **AND** D5W **AND** NS  •  NS  •  gabapentin  •  morphine injection  •  lactobacillus rhamnosus  •  oxyCODONE immediate-release  •  sucralfate  •  acetaminophen  •  pramipexole  •  lidocaine  •  temazepam  •  cyclobenzaprine  •  Respiratory Care per Protocol  •  amLODIPine  •  cinacalcet  •  losartan  •  atorvastatin  •  omeprazole  •  metoprolol  •  ondansetron  •  ondansetron    Labs:  Recent Labs      19   0401  19   0256  19   0341   WBC  6.5  5.7  6.4   RBC  3.45*  3.69*  3.51*   HEMOGLOBIN  10.1*  10.8*  9.9*   HEMATOCRIT  31.2*  33.2*  32.1*   MCV  90.4  90.0  91.5   MCH  29.3  29.3  28.2   RDW  54.4*  53.9*  55.9*   PLATELETCT  228   232  227   MPV  10.1  9.8  9.4     Recent Labs      07/06/19   0401  07/07/19   0256  07/08/19   0341   SODIUM  133*  136  137   POTASSIUM  3.1*  3.5*  3.8   CHLORIDE  104  106  110   CO2  21  19*  18*   GLUCOSE  93  86  80   BUN  8  8  14     Recent Labs      07/06/19   0401  07/07/19   0256  07/08/19   0341   ALBUMIN  3.1*   --   3.1*   TBILIRUBIN  0.5   --   0.3   ALKPHOSPHAT  469*   --   351*   TOTPROTEIN  6.4   --   6.1   ALTSGPT  17   --   10   ASTSGOT  33   --   24   CREATININE  0.43*  0.49*  0.53       Imaging:  Ct-needle Bx-deep Bone    Result Date: 6/20/2019 6/19/2019 9:12 AM HISTORY/REASON FOR EXAM:  Concern for right hip osteomyelitis. TECHNIQUE/EXAM DESCRIPTION: Right hip deep bone biopsy with CT guidance. Low dose optimization technique was utilized for this CT exam including automated exposure control and adjustment of the mA and/or kV according to patient size. PROCEDURE: Informed consent was obtained. Conscious sedation with 25 mcg Fentanyl and 1 mg Versed was administered during the procedure with appropriate continuous patient monitoring by the radiology nurse. Sedation duration: 30 minutes. Localizing CT images were obtained with the patient in prone position. The skin was prepped with ChloraPrep and draped in a sterile fashion. Following local anesthesia with 1% Lidocaine, a 13 G guiding needle was placed and needle position confirmed with CT. Using coaxial technique, an 14 G core biopsy needle was placed into the target lesion in the right posterior ilium and needle position confirmed with CT. A total of 2 samples were obtained in this fashion and submitted in saline for microbiology. The guiding needle was removed and limited CT images obtained through the biopsy site show no evidence of significant parenchymal hemorrhage. The patient tolerated the procedure well. COMPARISON: None available. COMPLICATIONS: No immediate complications. FINDINGS: The localizing CT images show satisfactory needle  position within the target location.     CT GUIDED RIGHT ILIUM DEEP BONE BIOPSY. SAMPLES WERE SENT TO MICROBIOLOGY FOR ANALYSIS.    Ct-pelvis With    Result Date: 6/17/2019 6/17/2019 7:23 AM HISTORY/REASON FOR EXAM:  Pelvic abscess TECHNIQUE/EXAM DESCRIPTION:  CT pelvis without IV contrast.   Sequential axial CT images were obtained from the lower lumbar spine to the proximal femurs following the administration of 100 cc Omnipaque 350 intravenous contrast. Low dose optimization technique was utilized for this CT exam including automated exposure control and adjustment of the mA and/or kV according to patient size. COMPARISON:  None FINDINGS: The visualized portions of the small bowel and colon appear within normal limits. The bladder is within normal limits. Inferior RIGHT iliacus fluid collection now measures 1.8 x 2.1 cm, previously 1.8 x 2.5 cm. More cephalad RIGHT iliacus fluid collection now measures 1.2 x 2.1 cm, previously 2 x 1.5 cm. Unhealed fractures with irregular margins or reflux demonstrated in the RIGHT iliac bone and the sacrum with a screw track extending into the LEFT iliac bone. There are areas of lucency irregularity in the LEFT iliac bone. There is anterolisthesis of L5 on S1 with irregular lucencies adjacent to the narrowed disc space. There is a 2 x 1.2 cm hyperdense area in the RIGHT gluteal musculature on axial image 14 of series 2. There is overlying soft tissue edema. There are mildly prominent BILATERAL inguinal lymph nodes.     1.  No significant interval change in the size of the RIGHT iliac muscle fluid collections 2.  Hyperdense RIGHT gluteal lesion moderately suspicious for pseudoaneurysm with adjacent hematoma. 3.  Changes in the RIGHT iliac bone, BILATERAL sacrum and LEFT iliac bone suspicious for osteomyelitis 4.  Changes at L5-S1 suspicious for discitis osteomyelitis    Ct-pelvis With    Result Date: 6/9/2019 6/9/2019 9:03 AM HISTORY/REASON FOR EXAM:  Follow-up gluteal abscess.  TECHNIQUE/EXAM DESCRIPTION AND NUMBER OF VIEWS: CT scan of the pelvis with contrast. Contrast-enhanced helical scanning of the pelvis was obtained from the iliac crests through the pubic symphysis following the bolus administration of 100 mL of Omnipaque 350 nonionic contrast without complication. Low dose optimization technique was utilized for this CT exam including automated exposure control and adjustment of the mA and/or kV according to patient size. COMPARISON:  CT pelvis 5/28/2019 FINDINGS: There is abdominal aortic atherosclerotic plaque. There is colonic diverticulosis. Visualized bowel is otherwise normal in appearance There has been prior hysterectomy. There is fluid within the right iliac is muscle measuring 2.5 x 1.8 cm. This is consistent with an abscess and a slightly smaller. Orthopedic hardware has been removed. There is a subacute fracture of the right iliac crest with partial healing. There is subacute fracture of the right posterior ilium and sacrum. There is a lucency within the left ilium that is probably from prior hardware. There are lytic changes of the right iliac crest and especially the sacrum. This could be osteomyelitis although could be related to prior hardware.     1.  Residual or recurrent abscess within the right iliacus muscle measuring 2.5 x 1.8 cm. It slightly smaller than on 5/28/2019 2.  Interval removal of hardware from the iliac bones and sacrum 3.  Lytic change of the right iliac bone and the sacrum. This could be related to hardware versus osteomyelitis. 4.  Subacute fractures of the right ilium, sacrum, and there is lucency within the left iliac bone that is likely from hardware    Ir-us Guided Piv    Result Date: 7/8/2019  EXAMINATION:                                                                    HISTORY/REASON FOR EXAM:  Ultrasound Guided PIV.  TECHNIQUE/EXAM DESCRIPTION AND NUMBER OF VIEWS:  Peripheral IV insertion with ultrasound guidance.  The procedure was  prepared using maximal sterile barrier technique including sterile gown, mask, cap, and donning of sterile gloves following appropriate hand hygiene and/or sterile scrub. Patient skin site was prepped with 2% Chlorhexidine solution.   FINDINGS: Peripheral IV insertion with Ultrasound Guidance was performed by qualified imaging nursing staff without the assistance of a Radiologist.      Ultrasound-guided PERIPHERAL IV INSERTION performed by qualified nursing staff as above.     Mr-brain-with & W/o    Result Date: 6/22/2019 6/22/2019 10:48 AM HISTORY/REASON FOR EXAM:  Headache, new, meningitis or encephalitis suspected. TECHNIQUE/EXAM DESCRIPTION: MRI of the brain without and with contrast, with diffusion. The study was performed on a [employer] Signa 1.5 Miranda MRI scanner. Spoiled-GRASS sagittal, thin-section T2 axial, T1 coronal, T1 postcontrast coronal, T1 postcontrast axial, FLAIR coronal and diffusion-weighted axial images were obtained of the whole brain. 6 mL Gadavist contrast were administered intravenously. COMPARISON:  6/8/2019. FINDINGS: There is a pattern of mild cerebral atrophy manifest as prominence of sulcal markings over the convexities and vertex along with mild ventriculomegaly. The bony calvaria are intact. There is no evidence of extra-axial fluid collection or intracranial mass effect. Innumerable supra and infratentorial enhancing nodules are again noted. A lesion in the right thalamus appears somewhat more prominent when compared to the previous study measuring approximately 8 mm, previously approximately 5 mm. A lesion in the right frontal operculum gray-white junction demonstrates increased enhancement but overall unchanged size. A lesion in the right posterior hippocampus appears more somewhat larger and with increased enhancement. Enhancement. Appear to demonstrate associated diffusion restriction. There is an underlying pattern of mild supratentorial white matter disease with scattered foci of  bright T2 and FLAIR signal in the subcortical and deep white matter of both hemispheres consistent with small vessel ischemic change versus demyelination or gliosis. There are normal flow voids in the cavernous carotid arteries and M1 segments. There are normal flow voids in the distal vertebral basilar system. There are normal flow voids in the dural venous sinuses. The visualized paranasal sinuses and mastoid air cells appear clear.     1.  Innumerable supra and infratentorial enhancing nodules are again noted throughout the brain parenchyma with multiple lesions appearing somewhat larger and with increased enhancement. No associated diffusion restriction. Unchanged differential considerations including bacterial or fungal infection versus metastatic disease. 2.  Mild diffuse cerebral substance loss. 3.  Mild microangiopathic ischemic change versus demyelination or gliosis.    Mr-lumbar Spine-with & W/o    Result Date: 6/28/2019 6/28/2019 9:56 AM HISTORY/REASON FOR EXAM:  Back pain or radiculopathy, cancer or infection suspected. TECHNIQUE/EXAM DESCRIPTION: MRI of the lumbar spine without and with contrast. The study was performed on a [employer] Signa 1.5 Miranda MRI scanner. T1 sagittal, T2 fast spin-echo sagittal, and T2 axial images were obtained of the lumbar spine. T1 post-contrast fat-suppressed sagittal images were obtained. Optional T2 fat-suppressed sagittal and T1 post-contrast axial images may be obtained. 6 mL Gadavist contrast was administered intravenously. COMPARISON:  MRI scan of the lumbar spine 3/26/2019 FINDINGS: There is a grade 2-3 spondylolisthesis at the L5-S1 level with bilateral spondylolysis of pars interarticularis. There is been interval removal of the posterior fixation hardware since previous exam. There is diffuse decreased T1 signal intensity throughout the majority of the L5 vertebral body and the first and second sacral segments. Further there is heterogeneous increased T2 signal  intensity and enhancement in these regions. There is also evidence for enhancing soft tissue anterior to the L5 vertebral body and first through third sacral segments. There are elliptical nonenhancing areas in this region of abnormal enhancing soft tissue anterior to the L5-S1 disc space and first sacral segment. Additionally there are ovoid nonenhancing areas in  the first and third sacral segments. There is a 4 x 2.1 cm somewhat ovoid fluid signal intensity collection lateral to the right side of the iliac bone involving the right gluteal musculature. Additionally there is a screw tract coursing transversely through the iliac bones and sacrum which  has heterogeneous enhancement. There is enhancing soft tissue signal intensity extending from the screw tract in the right ilium into the right-sided gluteal soft tissues. There is also an ovoid area of decreased T1 and increased T2 signal intensity in the right posterior ilium. There is a 3.3 cm multiloculated fluid signal intensity collection involving the right sacroiliac joint. The conus is normal in position and signal. There is no abnormal cord enhancement. There is mild to moderate disc space narrowing at the L5-S1 level which has evidence of increased T2 signal intensity and enhancement. At T12-L1 through L4-5 disc height and signal is normal. Level specific findings: L5-S1 level minimal annular bulging. Severe bilateral neural foraminal stenosis secondary to the spondylolisthesis. L4-5 level minimal posterior spurring and annular bulging. L3-4 level minimal annular bulging. L2-3 level minimal annular bulging. L1-2 level minimal annular bulging.     1.  Grade 2-3 spondylolisthesis at the L5-S1 level with bilateral spondylolysis of the pars interarticularis. This causes severe bilateral neural foraminal stenosis at this level. 2.  Interval removal of posterior fusion hardware at the lumbosacral junction. 3.  Diffuse osteomyelitis involving the L5 vertebral body  and the first 3 sacral segments. 4.  Chronic discitis at the L5-S1 level with anterior paraspinous abscess at this level and anterior to the S1 sacral segment. 5.  Smaller intraosseous bone bone abscesses or areas of necrosis noted in the sacrum. 6.  There is also evidence of osteomyelitis involving the jose antonio and sacrum bilaterally along the course of the previous sacral fixation screw. There is a large 4 cm abscess noted in the right gluteal soft tissues in a smaller 3 cm chronic appearing abscess in the left gluteal musculature. 7.  There is a 1.5 cm intraosseous abscess or area of necrosis in the right posterior ilium. 8.  There is a 3.3 cm centimeters multiloculated abscess anterior to the right sacroiliac joint.    Us-extremity Non Vascular Unilateral Right    Result Date: 6/22/2019 6/22/2019 2:45 PM HISTORY/REASON FOR EXAM:  hx of right hip hardware removal 6/15; swelling Evaluate right hip for fluid collection. TECHNIQUE/EXAM DESCRIPTION AND NUMBER OF VIEWS: Limited ultrasound of the right hip COMPARISON: 6/17/2019 FINDINGS: No right hip joint effusion. No soft tissue fluid collection.     No right hip joint effusion. No soft tissue fluid collection.    Mr-stealth Brain With & W/o    Result Date: 6/28/2019 6/28/2019 9:57 AM HISTORY/REASON FOR EXAM:  Brain metastasis follow-up TECHNIQUE/EXAM DESCRIPTION AND NUMBER OF VIEWS: MRI of the brain without and with contrast, [employer] protocol. Fiducial markers for the Stealth stereotactic system were placed on the scalp. Thin-section 2 mm T2 fast spin-echo true axial images were obtained of the whole brain. Thin-section 1.5 mm T1-weighted postcontrast axial 3D BRAVO images were obtained of the whole brain. 3D reconstructions in the Coronal and Sagittal planes were generated from the axial 3D BRAVO postcontrast sequence. The study was performed on a [employer] Signa 1.5 Miranda MRI scanner. 6 mL Gadavist contrast was administered intravenously. COMPARISON:  MRI scans brain  3/7/2019 FINDINGS: There are innumerable varying size nodular enhancing lesions throughout the brain parenchyma. Many of these lesions are at the gray-white junction but there are multiple lesions arising in the region of the basal ganglia. The largest lesion is in the right thalamus and measures 9 mm in greatest diameter. There is a moderate amount of surrounding increased T2 signal intensity in the right thalamus. This lesion has increased in size since previous exam. The majority of the lesions have not changed significantly since previous exam. The calvariae are unremarkable. There are no extra-axial fluid collections. The ventricular system and basal cisterns are mild to moderately prominent. There is mild-to-moderate prominence of cortical sulci and gyri. There are no mass effects or shift of  midline structures. There are no hemorrhagic lesions. Vascular flow voids in the vertebrobasilar and carotid arteries, [place], and dural venous sinuses are intact. The paranasal sinuses and mastoids in the field of view are unremarkable.     1.  Innumerable subcentimeter brain metastases, many at the gray-white junction but also multiple noted throughout the basal ganglia and brainstem. 2.  Largest index lesion is noted in the right thalamus and has increased in size since previous exam and currently measures 9 mm and has a moderate amount of surrounding vasogenic edema.      Micro:  Results     Procedure Component Value Units Date/Time    Cryptococcal Antigen Titer [[id number]] Collected:  07/04/19 0000    Order Status:  Canceled Specimen:  Other from Blood     Anaerobic Culture [[id number]] Collected:  06/28/19 1402    Order Status:  Completed Specimen:  Tissue Updated:  07/03/19 1347     Significant Indicator NEG     Source TISS     Site Right Parietal Lesion     Culture Result No Anaerobes isolated.    Narrative:       Surgery Specimen    AFB Culture [[id number]] Collected:  06/28/19 5034    Order Status:   Completed Specimen:  Tissue Updated:  07/03/19 1347     Significant Indicator NEG     Source TISS     Site Right Parietal Lesion     Culture Result Culture in progress.     AFB Smear Results No acid fast bacilli seen.    Narrative:       Surgery Specimen    Fungal Culture [[id number]] Collected:  06/28/19 1403    Order Status:  Completed Specimen:  Tissue Updated:  07/03/19 1347     Significant Indicator NEG     Source TISS     Site Right Parietal Lesion     Culture Result No fungal growth to date.    Narrative:       Surgery Specimen    CULTURE TISSUE W/ GRM STAIN [[id number]] Collected:  06/28/19 1403    Order Status:  Completed Specimen:  Tissue Updated:  07/03/19 1347     Significant Indicator NEG     Source TISS     Site Right Parietal Lesion     Culture Result No growth at 72 hours.     Gram Stain Result Rare WBCs.  No organisms seen.      Narrative:       Surgery Specimen          Assessment:  Active Hospital Problems    Diagnosis   • *Brain lesion [G93.9]   • Granulomatous disease  [L92.9]   • Abscess of right iliac muscle and right gluteus medius muscle [L02.91]   • Pseudoaneurysm following procedure (HCC) [I97.89, I72.9]   • Sepsis (HCC) [A41.9]   • Hypomagnesemia [E83.42]   • Hypercalcemia [E83.52]   • Acquired circulating anticoagulants (HCC) [D68.318]   • Non-severe protein-calorie malnutrition (HCC) [E46]   • History of DVT (deep vein thrombosis) [Z86.718]   • Essential hypertension [I10]   • Chronic hyponatremia [E87.1]   • History of breast cancer [Z85.3]   • COPD (chronic obstructive pulmonary disease) (HCC) [J44.9]   • Chronic pain [G89.29]   • RLS (restless legs syndrome) [G25.81]        Interval 24 hour assessment:    AF, O2 RA,    Labs reviewed  Imaging personally reviewed both images and report.   Studies reviewed  Micro reviewed and discussed with micro biology lab    Pt has no specific complaints, she is mildly confused today.  She seems to be tolerating her medications with no significant lab  "abnormalities.    Assessment/Plan:    Brain abscesses   Query CNS fungal infection vs mycobacterial or other   MRI 4/23 \"supra and infratentorial brain parenchyma. Decrease in the size of the lesions. Interval reduction in the extent of the surrounding white matter edema consistent with improvement/treatment response of the most of the multifocal brain abscesses.  MRI 5/21 showed innumerable microabscesses vs mets  Abx (vancomycin, cefepime and Flagyl) discontinued on 5/23 after ~8 weeks of treatment-developed new fevers on 6/4  MRI on 6/8 with interval progression of supra and infratentorial intra--axial nodules and associated edema.  New right thalamus lesion. Radiology favors infection over neoplasm though both remain considerations (had been off abx)  Mult blood cultures neg  CHAS neg 3/15 and 5/24  MRI 6/22 worsening CNS lesions  S/p right craniotomy and resection of mass with biopsy on 6/28. Biopsy-per note fungal appearing elements seen per Neurosurgery-discussed with pathologist who examined the frozen section and there were no fungal appearing elements.   +necrotizing granulomas. All stains negative in EPIC  Fungal culture- negative to date  Bacterial cultures-negative to date  AFB culture-in progress  Pathology- focal necrotizing granulomatous inflammation; no fungal elements or acid-fast bacilli on stains.  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  Still with specimen in paraffin block, formalin was used  --- Spoke with send out laboratory and they will call [facility] tomorrow to see what is the best specimen for send-out,  there is a specimen to pathology and microbiology currently with fungal and AFB cultures ongoing, plan to send out specimen to [facility] for PCR testing, bacterial, AFB, fungal in the hopes of obtaining some direction for therapy as we have an ongoing process, no clear etiology and patient is on multiple potentially toxic medications     Gluteal and iliopsoas abscess   OM L5, S1-3, new this admission  Recurrent abscesses, additional work  Adjacent to hardware in right hip (placed 12/17/18)  CT 4/23 \"Fluid collections within the right gluteal and iliacis muscles.. The collection within the right gluteal muscle has unchanged while the fluid collection within the right iliacis muscle is increased somewhat in size.\" 2.7 cm  Cultures 3/29 and 4/4 neg  MRI on 5/20/2019 - interval decrease in collection in R gluteal muscle and persistent multiloculated collection in R iliacus muscle.   CT 5/28 no change  s/p drainage on 5/30/2019-cultures negative  S/p removal hardware 6/5-no cultures done  1, 3 beta glucan neg  CT on 6/8 with residual abscess in the right iliacus muscle, 2.5 x 1.8 cm, slightly smaller than prior  s/p CT-guided deep bone biopsy 6/19/2019.  Cultures remain negative; path not reported   MRI 6/28 chronic discitis, diffuse OM L5, 3 and 4 cm abscesses right glute, 1.5 cm abscess or necrosis right posterior ileum, 3.3 cm abscess right SI joint    --- Ampho B as above and TB therapy  --- Tomorrow will discuss if drainage can be done -it is unlikely that there are 2 separate processes going on with this patient -if we can obtain fluid or tissue this will be sent for in-house " cultures but also sent to the [facility] for extended fungal, AFB and bacterial PCR testing -this may help us explain what is happening intracranially as well     +[employer]  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

## 2019-07-10 ENCOUNTER — APPOINTMENT (OUTPATIENT)
Dept: RADIOLOGY | Facility: MEDICAL CENTER | Age: 69
DRG: 356 | End: 2019-07-10
Attending: HOSPITALIST
Payer: MEDICARE

## 2019-07-10 LAB
ALBUMIN SERPL BCP-MCNC: 3.9 G/DL (ref 3.2–4.9)
ALBUMIN/GLOB SERPL: 1.1 G/DL
ALP SERPL-CCNC: 360 U/L (ref 30–99)
ALT SERPL-CCNC: 13 U/L (ref 2–50)
ANION GAP SERPL CALC-SCNC: 13 MMOL/L (ref 0–11.9)
AST SERPL-CCNC: 28 U/L (ref 12–45)
BILIRUB SERPL-MCNC: 0.6 MG/DL (ref 0.1–1.5)
BUN SERPL-MCNC: 7 MG/DL (ref 8–22)
CALCIUM SERPL-MCNC: 9.5 MG/DL (ref 8.5–10.5)
CHLORIDE SERPL-SCNC: 105 MMOL/L (ref 96–112)
CO2 SERPL-SCNC: 21 MMOL/L (ref 20–33)
CREAT SERPL-MCNC: 0.49 MG/DL (ref 0.5–1.4)
ERYTHROCYTE [DISTWIDTH] IN BLOOD BY AUTOMATED COUNT: 52.1 FL (ref 35.9–50)
GLOBULIN SER CALC-MCNC: 3.7 G/DL (ref 1.9–3.5)
GLUCOSE SERPL-MCNC: 100 MG/DL (ref 65–99)
HCT VFR BLD AUTO: 35 % (ref 37–47)
HGB BLD-MCNC: 11.6 G/DL (ref 12–16)
MAGNESIUM SERPL-MCNC: 1.7 MG/DL (ref 1.5–2.5)
MCH RBC QN AUTO: 29.2 PG (ref 27–33)
MCHC RBC AUTO-ENTMCNC: 33.1 G/DL (ref 33.6–35)
MCV RBC AUTO: 88.2 FL (ref 81.4–97.8)
PLATELET # BLD AUTO: 283 K/UL (ref 164–446)
PMV BLD AUTO: 8.9 FL (ref 9–12.9)
POTASSIUM SERPL-SCNC: 3.5 MMOL/L (ref 3.6–5.5)
PROT SERPL-MCNC: 7.6 G/DL (ref 6–8.2)
RBC # BLD AUTO: 3.97 M/UL (ref 4.2–5.4)
SODIUM SERPL-SCNC: 139 MMOL/L (ref 135–145)
T4 FREE SERPL-MCNC: 1.06 NG/DL (ref 0.53–1.43)
TEST NAME 95000: NORMAL
TSH SERPL DL<=0.005 MIU/L-ACNC: 6.9 UIU/ML (ref 0.38–5.33)
WBC # BLD AUTO: 9.3 K/UL (ref 4.8–10.8)

## 2019-07-10 PROCEDURE — 80053 COMPREHEN METABOLIC PANEL: CPT

## 2019-07-10 PROCEDURE — 700102 HCHG RX REV CODE 250 W/ 637 OVERRIDE(OP): Performed by: FAMILY MEDICINE

## 2019-07-10 PROCEDURE — 99358 PROLONG SERVICE W/O CONTACT: CPT | Performed by: HOSPITALIST

## 2019-07-10 PROCEDURE — 700102 HCHG RX REV CODE 250 W/ 637 OVERRIDE(OP): Performed by: HOSPITALIST

## 2019-07-10 PROCEDURE — A9270 NON-COVERED ITEM OR SERVICE: HCPCS | Performed by: FAMILY MEDICINE

## 2019-07-10 PROCEDURE — 86612 BLASTOMYCES ANTIBODY: CPT

## 2019-07-10 PROCEDURE — 83735 ASSAY OF MAGNESIUM: CPT

## 2019-07-10 PROCEDURE — 84443 ASSAY THYROID STIM HORMONE: CPT

## 2019-07-10 PROCEDURE — A9270 NON-COVERED ITEM OR SERVICE: HCPCS | Performed by: INTERNAL MEDICINE

## 2019-07-10 PROCEDURE — 700101 HCHG RX REV CODE 250: Performed by: FAMILY MEDICINE

## 2019-07-10 PROCEDURE — 700111 HCHG RX REV CODE 636 W/ 250 OVERRIDE (IP): Mod: JG | Performed by: FAMILY MEDICINE

## 2019-07-10 PROCEDURE — A9270 NON-COVERED ITEM OR SERVICE: HCPCS | Performed by: HOSPITALIST

## 2019-07-10 PROCEDURE — 86698 HISTOPLASMA ANTIBODY: CPT

## 2019-07-10 PROCEDURE — 99233 SBSQ HOSP IP/OBS HIGH 50: CPT | Performed by: INTERNAL MEDICINE

## 2019-07-10 PROCEDURE — 700111 HCHG RX REV CODE 636 W/ 250 OVERRIDE (IP): Performed by: HOSPITALIST

## 2019-07-10 PROCEDURE — A9270 NON-COVERED ITEM OR SERVICE: HCPCS | Performed by: NURSE PRACTITIONER

## 2019-07-10 PROCEDURE — 700112 HCHG RX REV CODE 229: Performed by: NURSE PRACTITIONER

## 2019-07-10 PROCEDURE — 99233 SBSQ HOSP IP/OBS HIGH 50: CPT | Performed by: HOSPITALIST

## 2019-07-10 PROCEDURE — 87385 HISTOPLASMA CAPSUL AG IA: CPT

## 2019-07-10 PROCEDURE — 700105 HCHG RX REV CODE 258: Performed by: FAMILY MEDICINE

## 2019-07-10 PROCEDURE — 36415 COLL VENOUS BLD VENIPUNCTURE: CPT

## 2019-07-10 PROCEDURE — 84439 ASSAY OF FREE THYROXINE: CPT

## 2019-07-10 PROCEDURE — 700102 HCHG RX REV CODE 250 W/ 637 OVERRIDE(OP): Performed by: INTERNAL MEDICINE

## 2019-07-10 PROCEDURE — 85027 COMPLETE CBC AUTOMATED: CPT

## 2019-07-10 PROCEDURE — 86622 BRUCELLA ANTIBODY: CPT

## 2019-07-10 PROCEDURE — 86638 Q FEVER ANTIBODY: CPT | Mod: 91

## 2019-07-10 PROCEDURE — 700102 HCHG RX REV CODE 250 W/ 637 OVERRIDE(OP): Performed by: NURSE PRACTITIONER

## 2019-07-10 PROCEDURE — 770001 HCHG ROOM/CARE - MED/SURG/GYN PRIV*

## 2019-07-10 PROCEDURE — 700111 HCHG RX REV CODE 636 W/ 250 OVERRIDE (IP): Performed by: FAMILY MEDICINE

## 2019-07-10 RX ORDER — ROPINIROLE 2 MG/1
2 TABLET, FILM COATED ORAL 2 TIMES DAILY
Status: DISCONTINUED | OUTPATIENT
Start: 2019-07-10 | End: 2019-07-10

## 2019-07-10 RX ORDER — ROPINIROLE 2 MG/1
2 TABLET, FILM COATED ORAL NIGHTLY
Status: DISCONTINUED | OUTPATIENT
Start: 2019-07-10 | End: 2019-07-10

## 2019-07-10 RX ORDER — MAGNESIUM SULFATE HEPTAHYDRATE 40 MG/ML
2 INJECTION, SOLUTION INTRAVENOUS ONCE
Status: COMPLETED | OUTPATIENT
Start: 2019-07-10 | End: 2019-07-10

## 2019-07-10 RX ORDER — DIPHENHYDRAMINE HYDROCHLORIDE, ZINC ACETATE 2; .1 G/100G; G/100G
CREAM TOPICAL 3 TIMES DAILY PRN
Status: DISCONTINUED | OUTPATIENT
Start: 2019-07-10 | End: 2019-08-10 | Stop reason: HOSPADM

## 2019-07-10 RX ADMIN — HEPARIN SODIUM 5000 UNITS: 5000 INJECTION, SOLUTION INTRAVENOUS; SUBCUTANEOUS at 17:48

## 2019-07-10 RX ADMIN — DOCUSATE SODIUM 100 MG: 100 CAPSULE, LIQUID FILLED ORAL at 04:34

## 2019-07-10 RX ADMIN — OXYCODONE HYDROCHLORIDE 10 MG: 5 TABLET ORAL at 10:27

## 2019-07-10 RX ADMIN — OMEPRAZOLE 20 MG: 20 CAPSULE, DELAYED RELEASE ORAL at 04:32

## 2019-07-10 RX ADMIN — POTASSIUM CHLORIDE 40 MEQ: 20 TABLET, EXTENDED RELEASE ORAL at 17:48

## 2019-07-10 RX ADMIN — DIPHENHYDRAMINE HCL 25 MG: 25 TABLET ORAL at 17:29

## 2019-07-10 RX ADMIN — SUCRALFATE 1 G: 1 SUSPENSION ORAL at 04:33

## 2019-07-10 RX ADMIN — SUCRALFATE 1 G: 1 SUSPENSION ORAL at 13:15

## 2019-07-10 RX ADMIN — GABAPENTIN 100 MG: 100 CAPSULE ORAL at 13:15

## 2019-07-10 RX ADMIN — SODIUM CHLORIDE 500 ML: 9 INJECTION, SOLUTION INTRAVENOUS at 16:08

## 2019-07-10 RX ADMIN — TEMAZEPAM 15 MG: 15 CAPSULE ORAL at 23:49

## 2019-07-10 RX ADMIN — OXYCODONE HYDROCHLORIDE 10 MG: 5 TABLET ORAL at 14:28

## 2019-07-10 RX ADMIN — METOPROLOL TARTRATE 25 MG: 25 TABLET, FILM COATED ORAL at 17:48

## 2019-07-10 RX ADMIN — OXYCODONE HYDROCHLORIDE 10 MG: 5 TABLET ORAL at 21:49

## 2019-07-10 RX ADMIN — MORPHINE SULFATE 4 MG: 4 INJECTION INTRAVENOUS at 01:40

## 2019-07-10 RX ADMIN — AMLODIPINE BESYLATE 10 MG: 5 TABLET ORAL at 04:33

## 2019-07-10 RX ADMIN — CYCLOBENZAPRINE 10 MG: 10 TABLET, FILM COATED ORAL at 14:28

## 2019-07-10 RX ADMIN — OXYCODONE HYDROCHLORIDE 20 MG: 20 TABLET, FILM COATED, EXTENDED RELEASE ORAL at 04:33

## 2019-07-10 RX ADMIN — ATORVASTATIN CALCIUM 10 MG: 10 TABLET, FILM COATED ORAL at 17:29

## 2019-07-10 RX ADMIN — GABAPENTIN 100 MG: 100 CAPSULE ORAL at 17:48

## 2019-07-10 RX ADMIN — MAGNESIUM 64 MG (MAGNESIUM CHLORIDE) TABLET,DELAYED RELEASE 128 MG: at 04:33

## 2019-07-10 RX ADMIN — LEVETIRACETAM 500 MG: 500 TABLET, FILM COATED ORAL at 04:34

## 2019-07-10 RX ADMIN — SUCRALFATE 1 G: 1 SUSPENSION ORAL at 17:47

## 2019-07-10 RX ADMIN — LEVETIRACETAM 500 MG: 500 TABLET, FILM COATED ORAL at 17:48

## 2019-07-10 RX ADMIN — HYDRALAZINE HYDROCHLORIDE 10 MG: 20 INJECTION INTRAMUSCULAR; INTRAVENOUS at 23:48

## 2019-07-10 RX ADMIN — PYRAZINAMIDE 1000 MG: 500 TABLET ORAL at 04:33

## 2019-07-10 RX ADMIN — SENNOSIDES, DOCUSATE SODIUM 1 TABLET: 50; 8.6 TABLET, FILM COATED ORAL at 21:49

## 2019-07-10 RX ADMIN — LIDOCAINE 2 PATCH: 50 PATCH CUTANEOUS at 10:27

## 2019-07-10 RX ADMIN — LOSARTAN POTASSIUM 50 MG: 50 TABLET ORAL at 04:34

## 2019-07-10 RX ADMIN — ISONIAZID 300 MG: 300 TABLET ORAL at 04:32

## 2019-07-10 RX ADMIN — PRAMIPEXOLE DIHYDROCHLORIDE 1 MG: 0.5 TABLET ORAL at 04:32

## 2019-07-10 RX ADMIN — HEPARIN SODIUM 5000 UNITS: 5000 INJECTION, SOLUTION INTRAVENOUS; SUBCUTANEOUS at 04:33

## 2019-07-10 RX ADMIN — MAGNESIUM 64 MG (MAGNESIUM CHLORIDE) TABLET,DELAYED RELEASE 128 MG: at 17:47

## 2019-07-10 RX ADMIN — ETHAMBUTOL HYDROCHLORIDE 800 MG: 400 TABLET, FILM COATED ORAL at 04:33

## 2019-07-10 RX ADMIN — RIFAMPIN 300 MG: 300 CAPSULE ORAL at 04:33

## 2019-07-10 RX ADMIN — MAGNESIUM SULFATE 2 G: 2 INJECTION INTRAVENOUS at 13:15

## 2019-07-10 RX ADMIN — POTASSIUM CHLORIDE 40 MEQ: 20 TABLET, EXTENDED RELEASE ORAL at 04:34

## 2019-07-10 RX ADMIN — DEXTROSE MONOHYDRATE 30 ML: 50 INJECTION, SOLUTION INTRAVENOUS at 21:54

## 2019-07-10 RX ADMIN — PRAMIPEXOLE DIHYDROCHLORIDE 1 MG: 0.5 TABLET ORAL at 13:15

## 2019-07-10 RX ADMIN — PYRIDOXINE HCL TAB 50 MG 100 MG: 50 TAB at 04:33

## 2019-07-10 RX ADMIN — SODIUM CHLORIDE 250 ML: 9 INJECTION, SOLUTION INTRAVENOUS at 21:58

## 2019-07-10 RX ADMIN — CYCLOBENZAPRINE 10 MG: 10 TABLET, FILM COATED ORAL at 10:27

## 2019-07-10 RX ADMIN — RIFAMPIN 300 MG: 300 CAPSULE ORAL at 17:47

## 2019-07-10 RX ADMIN — PRAMIPEXOLE DIHYDROCHLORIDE 1 MG: 0.5 TABLET ORAL at 17:47

## 2019-07-10 RX ADMIN — Medication 1 CAPSULE: at 10:27

## 2019-07-10 RX ADMIN — SUCRALFATE 1 G: 1 SUSPENSION ORAL at 23:58

## 2019-07-10 RX ADMIN — DEXTROSE MONOHYDRATE 30 ML: 50 INJECTION, SOLUTION INTRAVENOUS at 17:36

## 2019-07-10 RX ADMIN — MORPHINE SULFATE 4 MG: 4 INJECTION INTRAVENOUS at 03:55

## 2019-07-10 RX ADMIN — GABAPENTIN 100 MG: 100 CAPSULE ORAL at 04:34

## 2019-07-10 RX ADMIN — ACETAMINOPHEN 650 MG: 325 TABLET, FILM COATED ORAL at 17:29

## 2019-07-10 RX ADMIN — DOCUSATE SODIUM 100 MG: 100 CAPSULE, LIQUID FILLED ORAL at 17:48

## 2019-07-10 RX ADMIN — ACETAMINOPHEN 1000 MG: 500 TABLET ORAL at 04:33

## 2019-07-10 RX ADMIN — AMPHOTERICIN B 285.5 MG: 50 INJECTION, POWDER, LYOPHILIZED, FOR SOLUTION INTRAVENOUS at 17:59

## 2019-07-10 RX ADMIN — DIPHENHYDRAMINE HYDROCHLORIDE, ZINC ACETATE: 2; .1 CREAM TOPICAL at 10:51

## 2019-07-10 RX ADMIN — MORPHINE SULFATE 4 MG: 4 INJECTION INTRAVENOUS at 16:09

## 2019-07-10 RX ADMIN — METOPROLOL TARTRATE 25 MG: 25 TABLET, FILM COATED ORAL at 04:34

## 2019-07-10 RX ADMIN — OXYCODONE HYDROCHLORIDE 20 MG: 20 TABLET, FILM COATED, EXTENDED RELEASE ORAL at 17:48

## 2019-07-10 ASSESSMENT — ENCOUNTER SYMPTOMS
COUGH: 0
SPUTUM PRODUCTION: 0
VOMITING: 0
SHORTNESS OF BREATH: 0
CHILLS: 0
NAUSEA: 0
CONSTIPATION: 0
MYALGIAS: 1
FEVER: 0
DIARRHEA: 0
WEAKNESS: 1
ABDOMINAL PAIN: 0
HEADACHES: 0

## 2019-07-10 NOTE — PROGRESS NOTES
"Rn called to Lab. Spoke with Gee in Lab. Rn explained Misc lab tests: Fungal DNA, yeast DNA, AFB DNA and bacterial DNA  will come from sample taken during upcoming procedure.    Misc lab test ordered as: \"Brucella culture Brucella antibody Coxiella Roe Antibody IgG & IgM Phase 1 and 2 with reflex to titer Histoplasmosis antibody by serum Histoplasmosis antigen by urine Blastomyces antigen quant by EIA - urine\"  Needs to be drawn by lab.     Gee states lab will put in individual orders for each of the listed  tests and send  to collect blood. Rn to get urine sample for \"Histoplasmosis antigen by urine Blastomyces antigen quant by EIA - urine\"       1210- Alexey from lab called Rn.  Clarification provided on misc. lab orders.       "

## 2019-07-10 NOTE — PROGRESS NOTES
Rn spoke with IR. Due to need for sedation pt will need to be NPO for procedure. IR also working out needed specimens for orders with  lab.   IR will do procedure tomorrow morning 7/10. IR requesting pt NPO after midnight and hold blood thinners.  Dr Alatorre aware. Order for NPO after midnight rcvd.

## 2019-07-10 NOTE — CARE PLAN
Problem: Pain Management  Goal: Pain level will decrease to patient's comfort goal  Outcome: PROGRESSING AS EXPECTED  PRN and scheduled pain medication given per MAR.    Problem: Urinary Elimination:  Goal: Ability to reestablish a normal urinary elimination pattern will improve  Outcome: PROGRESSING SLOWER THAN EXPECTED  Pt incontinent of urine. Unable to communicate the need to urinate.

## 2019-07-10 NOTE — PROGRESS NOTES
Infectious Disease Progress Note    Author: Geno Junior M.D. Date & Time of service: 7/10/2019  8:20 AM    Chief Complaint:  Brain abscess, gluteal abscess  Vertebral OM     Interval History:  68 y.o. female admitted 5/29/2019 as a transfer from [facility] since 4/30/2019. + diabetes, SANTOSH of right hip with hardware, and breast cancer who was originally admitted to [facility] on 03/08/2019 for right hip and pelvic pain.  Work-up revealed a right iliopsoas lesion, gluteal abscess, and mult brain abscesses     6/24/2019 no fevers.  The hip pain is better.  No cough no shortness of breath  6/25/2019-no fevers.  The hip cultures remain negative.  She is scheduled for stereotactic biopsy on Friday.  6/26/2019 no fevers are noted.  Patient complains of back pain.  In significant distress.  6/27/2019-no fevers.  Continues to complain of pain.  No new issues overnight  6/28 AF WBC 6.6 planned for MRIs and biopsy today-somnolent  6/29 AF WBC 7.7 obtunded prelim path showed fungus on brain specimen  6/30 AF somnolent-not awakening to voice-slurred speech noted by Neurosurgery  7/1 AF more awake today; not oriented-ate about half of lunch c/o pain in joints  7/2 AF WBC 5.5 no new complaints  7/3 AF still c/o unrelenting right hip pain whenever awake-Falls asleep during exam  7/4 AF much more alert and conversant today-c/o left hip pain, unrelenting and would like parietal dressing changed. HA at surgical site.  Denies SE abx. No new neurodeficits  7/5 afebrile WBC 5.4.  Patient sleeping but arousable.  She denies any headache, nausea, vomiting.  7/6 afebrile WBC 6.5.  Patient sitting up in chair and having excruciating back pain  7/8 Pt has no specific complaints, she is mildly confused today.  She seems to be tolerating her medications with no significant lab abnormalities.  7/9 AF, O2 RA, Significant left hip pain today.  She does appear to be tolerating her antibiotics.  Ultrasound of bilateral lower extremities has  been ordered.        Review of Systems:  Review of Systems   Constitutional: Positive for malaise/fatigue. Negative for chills and fever.   Respiratory: Negative for cough, sputum production and shortness of breath.    Gastrointestinal: Negative for abdominal pain, constipation, diarrhea, nausea and vomiting.   Musculoskeletal: Positive for joint pain and myalgias.   Skin: Negative for rash.   Neurological: Positive for weakness. Negative for headaches.       Hemodynamics:  Temp (24hrs), Av.7 °C (98.1 °F), Min:36.4 °C (97.5 °F), Max:37.1 °C (98.7 °F)  Temperature: 36.7 °C (98 °F)  Pulse  Av.9  Min: 49  Max: 125   Blood Pressure : 160/91       Physical Exam:  Physical Exam   Constitutional: She appears well-developed and well-nourished.   HENT:   Head: Normocephalic and atraumatic.   Eyes: Pupils are equal, round, and reactive to light. Conjunctivae and EOM are normal.   Cardiovascular: Normal rate, regular rhythm and normal heart sounds.    Pulmonary/Chest: Effort normal and breath sounds normal.   Abdominal: Soft. Bowel sounds are normal. She exhibits no distension. There is no tenderness. There is no rebound and no guarding.   Musculoskeletal: She exhibits no edema or tenderness.   Hips with no edema, no erythema, no ttp    Neurological:   Somnolent, mild confusion    Skin: Skin is warm and dry.   Some redness on front of knees   Psychiatric: She has a normal mood and affect.       Meds:    Current Facility-Administered Medications:   •  morphine injection  •  heparin  •  oxyCODONE CR  •  potassium chloride SA  •  magnesium chloride  •  pyridoxine  •  levETIRAcetam  •  isoniazid  •  riFAMPin  •  ethambutol  •  pyrazinamide  •  acetaminophen  •  Pharmacy Consult Request  •  labetalol  •  hydrALAZINE  •  docusate sodium  •  senna-docusate  •  Pharmacy  •  NS **AND** acetaminophen **AND** diphenhydrAMINE **AND** D5W **AND** amphotericin b liposomal (AMBISOME) IV **AND** D5W **AND** NS  •  NS  •   gabapentin  •  lactobacillus rhamnosus  •  oxyCODONE immediate-release  •  sucralfate  •  acetaminophen  •  pramipexole  •  lidocaine  •  temazepam  •  cyclobenzaprine  •  Respiratory Care per Protocol  •  amLODIPine  •  cinacalcet  •  losartan  •  atorvastatin  •  omeprazole  •  metoprolol  •  ondansetron  •  ondansetron    Labs:  Recent Labs      07/08/19   0341  07/09/19   0244  07/10/19   0244   WBC  6.4  6.7  9.3   RBC  3.51*  3.55*  3.97*   HEMOGLOBIN  9.9*  10.5*  11.6*   HEMATOCRIT  32.1*  32.1*  35.0*   MCV  91.5  90.4  88.2   MCH  28.2  29.6  29.2   RDW  55.9*  54.4*  52.1*   PLATELETCT  227  234  283   MPV  9.4  9.7  8.9*     Recent Labs      07/08/19   0341  07/09/19   0244  07/10/19   0244   SODIUM  137  140  139   POTASSIUM  3.8  4.3  3.5*   CHLORIDE  110  110  105   CO2  18*  20  21   GLUCOSE  80  107*  100*   BUN  14  11  7*     Recent Labs      07/08/19   0341  07/09/19   0244  07/10/19   0244   ALBUMIN  3.1*  3.3  3.9   TBILIRUBIN  0.3  0.4  0.6   ALKPHOSPHAT  351*  373*  360*   TOTPROTEIN  6.1  6.5  7.6   ALTSGPT  10  15  13   ASTSGOT  24  28  28   CREATININE  0.53  0.46*  0.49*       Imaging:  Ct-needle Bx-deep Bone    Result Date: 6/20/2019 6/19/2019 9:12 AM HISTORY/REASON FOR EXAM:  Concern for right hip osteomyelitis. TECHNIQUE/EXAM DESCRIPTION: Right hip deep bone biopsy with CT guidance. Low dose optimization technique was utilized for this CT exam including automated exposure control and adjustment of the mA and/or kV according to patient size. PROCEDURE: Informed consent was obtained. Conscious sedation with 25 mcg Fentanyl and 1 mg Versed was administered during the procedure with appropriate continuous patient monitoring by the radiology nurse. Sedation duration: 30 minutes. Localizing CT images were obtained with the patient in prone position. The skin was prepped with ChloraPrep and draped in a sterile fashion. Following local anesthesia with 1% Lidocaine, a 13 G guiding needle was  placed and needle position confirmed with CT. Using coaxial technique, an 14 G core biopsy needle was placed into the target lesion in the right posterior ilium and needle position confirmed with CT. A total of 2 samples were obtained in this fashion and submitted in saline for microbiology. The guiding needle was removed and limited CT images obtained through the biopsy site show no evidence of significant parenchymal hemorrhage. The patient tolerated the procedure well. COMPARISON: None available. COMPLICATIONS: No immediate complications. FINDINGS: The localizing CT images show satisfactory needle position within the target location.     CT GUIDED RIGHT ILIUM DEEP BONE BIOPSY. SAMPLES WERE SENT TO MICROBIOLOGY FOR ANALYSIS.    Ct-pelvis With    Result Date: 6/17/2019 6/17/2019 7:23 AM HISTORY/REASON FOR EXAM:  Pelvic abscess TECHNIQUE/EXAM DESCRIPTION:  CT pelvis without IV contrast.   Sequential axial CT images were obtained from the lower lumbar spine to the proximal femurs following the administration of 100 cc Omnipaque 350 intravenous contrast. Low dose optimization technique was utilized for this CT exam including automated exposure control and adjustment of the mA and/or kV according to patient size. COMPARISON:  None FINDINGS: The visualized portions of the small bowel and colon appear within normal limits. The bladder is within normal limits. Inferior RIGHT iliacus fluid collection now measures 1.8 x 2.1 cm, previously 1.8 x 2.5 cm. More cephalad RIGHT iliacus fluid collection now measures 1.2 x 2.1 cm, previously 2 x 1.5 cm. Unhealed fractures with irregular margins or reflux demonstrated in the RIGHT iliac bone and the sacrum with a screw track extending into the LEFT iliac bone. There are areas of lucency irregularity in the LEFT iliac bone. There is anterolisthesis of L5 on S1 with irregular lucencies adjacent to the narrowed disc space. There is a 2 x 1.2 cm hyperdense area in the RIGHT gluteal  musculature on axial image 14 of series 2. There is overlying soft tissue edema. There are mildly prominent BILATERAL inguinal lymph nodes.     1.  No significant interval change in the size of the RIGHT iliac muscle fluid collections 2.  Hyperdense RIGHT gluteal lesion moderately suspicious for pseudoaneurysm with adjacent hematoma. 3.  Changes in the RIGHT iliac bone, BILATERAL sacrum and LEFT iliac bone suspicious for osteomyelitis 4.  Changes at L5-S1 suspicious for discitis osteomyelitis    Ir-us Guided Piv    Result Date: 7/8/2019  EXAMINATION:                                                                    HISTORY/REASON FOR EXAM:  Ultrasound Guided PIV.  TECHNIQUE/EXAM DESCRIPTION AND NUMBER OF VIEWS:  Peripheral IV insertion with ultrasound guidance.  The procedure was prepared using maximal sterile barrier technique including sterile gown, mask, cap, and donning of sterile gloves following appropriate hand hygiene and/or sterile scrub. Patient skin site was prepped with 2% Chlorhexidine solution.   FINDINGS: Peripheral IV insertion with Ultrasound Guidance was performed by qualified imaging nursing staff without the assistance of a Radiologist.      Ultrasound-guided PERIPHERAL IV INSERTION performed by qualified nursing staff as above.     Mr-brain-with & W/o    Result Date: 6/22/2019 6/22/2019 10:48 AM HISTORY/REASON FOR EXAM:  Headache, new, meningitis or encephalitis suspected. TECHNIQUE/EXAM DESCRIPTION: MRI of the brain without and with contrast, with diffusion. The study was performed on a [employer] Signa 1.5 Miranda MRI scanner. Spoiled-GRASS sagittal, thin-section T2 axial, T1 coronal, T1 postcontrast coronal, T1 postcontrast axial, FLAIR coronal and diffusion-weighted axial images were obtained of the whole brain. 6 mL Gadavist contrast were administered intravenously. COMPARISON:  6/8/2019. FINDINGS: There is a pattern of mild cerebral atrophy manifest as prominence of sulcal markings over the  convexities and vertex along with mild ventriculomegaly. The bony calvaria are intact. There is no evidence of extra-axial fluid collection or intracranial mass effect. Innumerable supra and infratentorial enhancing nodules are again noted. A lesion in the right thalamus appears somewhat more prominent when compared to the previous study measuring approximately 8 mm, previously approximately 5 mm. A lesion in the right frontal operculum gray-white junction demonstrates increased enhancement but overall unchanged size. A lesion in the right posterior hippocampus appears more somewhat larger and with increased enhancement. Enhancement. Appear to demonstrate associated diffusion restriction. There is an underlying pattern of mild supratentorial white matter disease with scattered foci of bright T2 and FLAIR signal in the subcortical and deep white matter of both hemispheres consistent with small vessel ischemic change versus demyelination or gliosis. There are normal flow voids in the cavernous carotid arteries and M1 segments. There are normal flow voids in the distal vertebral basilar system. There are normal flow voids in the dural venous sinuses. The visualized paranasal sinuses and mastoid air cells appear clear.     1.  Innumerable supra and infratentorial enhancing nodules are again noted throughout the brain parenchyma with multiple lesions appearing somewhat larger and with increased enhancement. No associated diffusion restriction. Unchanged differential considerations including bacterial or fungal infection versus metastatic disease. 2.  Mild diffuse cerebral substance loss. 3.  Mild microangiopathic ischemic change versus demyelination or gliosis.    Mr-lumbar Spine-with & W/o    Result Date: 6/28/2019 6/28/2019 9:56 AM HISTORY/REASON FOR EXAM:  Back pain or radiculopathy, cancer or infection suspected. TECHNIQUE/EXAM DESCRIPTION: MRI of the lumbar spine without and with contrast. The study was performed  on a [employer] 1.5 Miranda MRI scanner. T1 sagittal, T2 fast spin-echo sagittal, and T2 axial images were obtained of the lumbar spine. T1 post-contrast fat-suppressed sagittal images were obtained. Optional T2 fat-suppressed sagittal and T1 post-contrast axial images may be obtained. 6 mL Gadavist contrast was administered intravenously. COMPARISON:  MRI scan of the lumbar spine 3/26/2019 FINDINGS: There is a grade 2-3 spondylolisthesis at the L5-S1 level with bilateral spondylolysis of pars interarticularis. There is been interval removal of the posterior fixation hardware since previous exam. There is diffuse decreased T1 signal intensity throughout the majority of the L5 vertebral body and the first and second sacral segments. Further there is heterogeneous increased T2 signal intensity and enhancement in these regions. There is also evidence for enhancing soft tissue anterior to the L5 vertebral body and first through third sacral segments. There are elliptical nonenhancing areas in this region of abnormal enhancing soft tissue anterior to the L5-S1 disc space and first sacral segment. Additionally there are ovoid nonenhancing areas in  the first and third sacral segments. There is a 4 x 2.1 cm somewhat ovoid fluid signal intensity collection lateral to the right side of the iliac bone involving the right gluteal musculature. Additionally there is a screw tract coursing transversely through the iliac bones and sacrum which  has heterogeneous enhancement. There is enhancing soft tissue signal intensity extending from the screw tract in the right ilium into the right-sided gluteal soft tissues. There is also an ovoid area of decreased T1 and increased T2 signal intensity in the right posterior ilium. There is a 3.3 cm multiloculated fluid signal intensity collection involving the right sacroiliac joint. The conus is normal in position and signal. There is no abnormal cord enhancement. There is mild to moderate  disc space narrowing at the L5-S1 level which has evidence of increased T2 signal intensity and enhancement. At T12-L1 through L4-5 disc height and signal is normal. Level specific findings: L5-S1 level minimal annular bulging. Severe bilateral neural foraminal stenosis secondary to the spondylolisthesis. L4-5 level minimal posterior spurring and annular bulging. L3-4 level minimal annular bulging. L2-3 level minimal annular bulging. L1-2 level minimal annular bulging.     1.  Grade 2-3 spondylolisthesis at the L5-S1 level with bilateral spondylolysis of the pars interarticularis. This causes severe bilateral neural foraminal stenosis at this level. 2.  Interval removal of posterior fusion hardware at the lumbosacral junction. 3.  Diffuse osteomyelitis involving the L5 vertebral body and the first 3 sacral segments. 4.  Chronic discitis at the L5-S1 level with anterior paraspinous abscess at this level and anterior to the S1 sacral segment. 5.  Smaller intraosseous bone bone abscesses or areas of necrosis noted in the sacrum. 6.  There is also evidence of osteomyelitis involving the jose antonio and sacrum bilaterally along the course of the previous sacral fixation screw. There is a large 4 cm abscess noted in the right gluteal soft tissues in a smaller 3 cm chronic appearing abscess in the left gluteal musculature. 7.  There is a 1.5 cm intraosseous abscess or area of necrosis in the right posterior ilium. 8.  There is a 3.3 cm centimeters multiloculated abscess anterior to the right sacroiliac joint.    Us-extremity Venous Lower Bilat    Result Date: 7/9/2019   Vascular Laboratory  CONCLUSIONS  Normal bilateral superficial and deep venous examination of the lower  extremities.  MARLENA CLIFFORD  Exam Date:     07/09/2019 11:10  Room #:     Inpatient  Priority:     Routine  Ht (in):             Wt (lb):  Ordering Physician:        APRIL MERA  Referring Physician:       [id number] Dawson  Sonographer:               Sumi  "MARIMAR Sales  Study Type:                Complete Bilateral  Technical Quality:         Adequate  Age:    68    Gender:     F  MRN:    [id number]  :    1950      BSA:  Indications:     Personal history of venous thrombosis and embolism  CPT Codes:       03469  ICD Codes:       Z86.71  History:         Per physician order \"history of reported DVT\", patient is                   poor historian. No previous duplex on file. DVT.  Limitations:  PROCEDURES:  Bilateral lower extremity venous duplex imaging.  The following venous structures were evaluated: common femoral, profunda  femoral, greater saphenous, femoral, popliteal , peroneal and posterior  tibial veins.  Serial compression, augmentation maneuvers,  color and spectral Doppler  flow evaluations were performed.  FINDINGS:  Bilateral lower extremities -  Complete color filling and compressibility with normal venous flow dynamics  including spontaneous flow, response to augmentation maneuvers, and  respiratory phasicity.  The peroneal and posterior tibial veins are difficult to assess for  compressibility, but flow response to augmentation is demonstrated.  Donovan Finley  (Electronically Signed)  Final Date:      2019                   12:51    Us-extremity Non Vascular Unilateral Right    Result Date: 2019 2:45 PM HISTORY/REASON FOR EXAM:  hx of right hip hardware removal 6/15; swelling Evaluate right hip for fluid collection. TECHNIQUE/EXAM DESCRIPTION AND NUMBER OF VIEWS: Limited ultrasound of the right hip COMPARISON: 2019 FINDINGS: No right hip joint effusion. No soft tissue fluid collection.     No right hip joint effusion. No soft tissue fluid collection.    Mr-stealth Brain With & W/o    Result Date: 2019 9:57 AM HISTORY/REASON FOR EXAM:  Brain metastasis follow-up TECHNIQUE/EXAM DESCRIPTION AND NUMBER OF VIEWS: MRI of the brain without and with contrast, Stealth protocol. Fiducial markers for the " Stealth stereotactic system were placed on the scalp. Thin-section 2 mm T2 fast spin-echo true axial images were obtained of the whole brain. Thin-section 1.5 mm T1-weighted postcontrast axial 3D BRAVO images were obtained of the whole brain. 3D reconstructions in the Coronal and Sagittal planes were generated from the axial 3D BRAVO postcontrast sequence. The study was performed on a [employer] Sign[employer] 1.5 Miranda MRI scanner. 6 mL Gadavist contrast was administered intravenously. COMPARISON:  MRI scans brain 3/7/2019 FINDINGS: There are innumerable varying size nodular enhancing lesions throughout the brain parenchyma. Many of these lesions are at the gray-white junction but there are multiple lesions arising in the region of the basal ganglia. The largest lesion is in the right thalamus and measures 9 mm in greatest diameter. There is a moderate amount of surrounding increased T2 signal intensity in the right thalamus. This lesion has increased in size since previous exam. The majority of the lesions have not changed significantly since previous exam. The calvariae are unremarkable. There are no extra-axial fluid collections. The ventricular system and basal cisterns are mild to moderately prominent. There is mild-to-moderate prominence of cortical sulci and gyri. There are no mass effects or shift of  midline structures. There are no hemorrhagic lesions. Vascular flow voids in the vertebrobasilar and carotid arteries, [place], and dural venous sinuses are intact. The paranasal sinuses and mastoids in the field of view are unremarkable.     1.  Innumerable subcentimeter brain metastases, many at the gray-white junction but also multiple noted throughout the basal ganglia and brainstem. 2.  Largest index lesion is noted in the right thalamus and has increased in size since previous exam and currently measures 9 mm and has a moderate amount of surrounding vasogenic edema.      Micro:  Results     Procedure Component  Value Units Date/Time    Cryptococcal Antigen Titer [[id number]] Collected:  07/04/19 0000    Order Status:  Canceled Specimen:  Other from Blood     Anaerobic Culture [[id number]] Collected:  06/28/19 1403    Order Status:  Completed Specimen:  Tissue Updated:  07/03/19 1347     Significant Indicator NEG     Source TISS     Site Right Parietal Lesion     Culture Result No Anaerobes isolated.    Narrative:       Surgery Specimen    AFB Culture [[id number]] Collected:  06/28/19 1403    Order Status:  Completed Specimen:  Tissue Updated:  07/03/19 1347     Significant Indicator NEG     Source TISS     Site Right Parietal Lesion     Culture Result Culture in progress.     AFB Smear Results No acid fast bacilli seen.    Narrative:       Surgery Specimen    Fungal Culture [[id number]] Collected:  06/28/19 1403    Order Status:  Completed Specimen:  Tissue Updated:  07/03/19 1347     Significant Indicator NEG     Source TISS     Site Right Parietal Lesion     Culture Result No fungal growth to date.    Narrative:       Surgery Specimen    CULTURE TISSUE W/ GRM STAIN [[id number]] Collected:  06/28/19 1403    Order Status:  Completed Specimen:  Tissue Updated:  07/03/19 1347     Significant Indicator NEG     Source TISS     Site Right Parietal Lesion     Culture Result No growth at 72 hours.     Gram Stain Result Rare WBCs.  No organisms seen.      Narrative:       Surgery Specimen          Assessment:  Active Hospital Problems    Diagnosis   • *Brain lesion [G93.9]   • Granulomatous disease  [L92.9]   • Abscess of right iliac muscle and right gluteus medius muscle [L02.91]   • Pseudoaneurysm following procedure ([facility]) [I97.89, I72.9]   • Sepsis (HCC) [A41.9]   • Hypomagnesemia [E83.42]   • Hypercalcemia [E83.52]   • Acquired circulating anticoagulants (HCC) [D68.318]   • Non-severe protein-calorie malnutrition (HCC) [E46]   • History of DVT (deep vein thrombosis) [Z86.718]   • Essential hypertension [I10]   • Chronic  "hyponatremia [E87.1]   • History of breast cancer [Z85.3]   • COPD (chronic obstructive pulmonary disease) (HCC) [J44.9]   • Chronic pain [G89.29]   • RLS (restless legs syndrome) [G25.81]     Interval 24 hour assessment:    AF, O2 RA,    Labs reviewed  Imaging personally reviewed both images and report.   Micro reviewed    Pt continued on RIPE and ampho.  Appears to be tolerating. Plan for IR drainage of sacral collection soon.      Assessment/Plan:     Brain abscesses   Query CNS fungal infection vs mycobacterial or other   MRI 4/23 \"supra and infratentorial brain parenchyma. Decrease in the size of the lesions. Interval reduction in the extent of the surrounding white matter edema consistent with improvement/treatment response of the most of the multifocal brain abscesses.  MRI 5/21 showed innumerable microabscesses vs mets  Abx (vancomycin, cefepime and Flagyl) discontinued on 5/23 after ~8 weeks of treatment-developed new fevers on 6/4  MRI on 6/8 with interval progression of supra and infratentorial intra--axial nodules and associated edema.  New right thalamus lesion. Radiology favors infection over neoplasm though both remain considerations (had been off abx)  Mult blood cultures neg  CHAS neg 3/15 and 5/24  MRI 6/22 worsening CNS lesions  S/p right craniotomy and resection of mass with biopsy on 6/28. Biopsy-per note fungal appearing elements seen per Neurosurgery-discussed with pathologist who examined the frozen section and there were no fungal appearing elements.   +necrotizing granulomas. All stains negative in EPIC  Fungal culture- negative to date  Bacterial cultures-negative to date  AFB culture-in progress  Pathology- focal necrotizing granulomatous inflammation; no fungal elements or acid-fast bacilli on stains.  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  Still with specimen in paraffin block, formalin was used    --- Spoke with pathology/send out asked them to call [facility] to see what is the best specimen for send-out,  there is a specimen to pathology and microbiology currently with fungal and AFB cultures ongoing, plan to send out BOTH specimens to [facility] for PCR testing, bacterial, AFB, fungal & mold in the hopes of obtaining some direction for therapy as we have an ongoing process, no clear etiology and patient is on multiple potentially toxic medications    --- Discussed MRI findings with radiologist and there is easily obtained tissue/fluid in the sacrum and area that has been worsening despite therapy, per radiology images most consistent to TB or Brucellosis  -will plan on biopsy in the next 1 to 2 days with fluid drainage and preferably TISSUE specimen to be sent to [facility] again for AFB, bacterial and fungal an mold PCR- have alerted micro and send out lab     --- Also send specimens to [facility] pathology for review for malignant cells, stains for fungal and AFB     --- Send to [facility] micro for bacterial gram stain and culture, fungal smear and culture, AFB stain and culture- will instruct micro to hold for 14 days      --- Misc serologic and urine orders also placed for Brucella culture and ab, Coxiella ab, Histo ab serum & antigen urine,  "Blasto antigen urine        Gluteal and iliopsoas abscess   OM L5, S1-3, new this admission  Recurrent abscesses, additional work  Adjacent to hardware in right hip (placed 12/17/18)  CT 4/23 \"Fluid collections within the right gluteal and iliacis muscles.. The collection within the right gluteal muscle has unchanged while the fluid collection within the right iliacis muscle is increased somewhat in size.\" 2.7 cm  Cultures 3/29 and 4/4 neg  MRI on 5/20/2019 - interval decrease in collection in R gluteal muscle and persistent multiloculated collection in R iliacus muscle.   CT 5/28 no change  s/p drainage on 5/30/2019-cultures negative  S/p removal hardware 6/5-no cultures done  1, 3 beta glucan neg  CT on 6/8 with residual abscess in the right iliacus muscle, 2.5 x 1.8 cm, slightly smaller than prior  s/p CT-guided deep bone biopsy 6/19/2019.  Cultures remain negative; path not reported   MRI 6/28 chronic discitis, diffuse OM L5, 3 and 4 cm abscesses right glute, 1.5 cm abscess or necrosis right posterior ileum, 3.3 cm abscess right SI joint     --- Ampho B as above and TB therapy     +QuantiGold  Query disseminated TB  Path with necrotizing granulomas  AFB stain neg  AFB cultures pending  All prior AFB cxs still neg  Started RIPE +B6 7/3     --- Repeat MRI will be needed  --- Monitor for visual changes while on ethambutol- please ask if ophthalmology can get baseline vision as well as color vision testing done while inpatient     Type 2 DM  Hemoglobin A1c 6.3   Keep BS under 150 to help control current infection    Discussed with Dr. Alatorre, pathology and micro.   "

## 2019-07-10 NOTE — PROGRESS NOTES
[facility] Medicine Daily Progress Note    Date of Service  7/10/2019    Chief Complaint    68 y.o. female admitted 5/29/2019 with pelvic pain and confusion .     Hospital Course    68 y.o. female transferred [facility] on [facility]29/2019 with persistent right gluteal abscesses since sacral fracture repair in December with multiple IR drainage.     Interval imaging to date:  MRI brain showed increase in brain lesions  CHAS done by Dr. Potter showed no vegetations.  MRI brain on 6/7/19 saw progression of the supra and infratentorial intra-axial nodules with edema, prompting a Neurosurgery consult.     Repeat CT on 6/17 showed no significant interval change in the size of the right iliacus muscle fluid collections, with changes in the right iliac bone, bilateral sacrum, and left iliac bone suspicious for osteomyelitis, and changes at L5-S1 suspicious for discitis/osteomyelitis, along with hyperdense right gluteal lesion, moderately suspicious for postoperative pseudoaneurysm with adjacent hematoma.      Cultures:  Pelvic abscess drainage on 5/30 with negative results.  Biopsy of the hip showed no AFB, organism, or fungal elements    Patient had re evaluation by Dr. Nguyen and she underwent right-sided craniotomy with resection of right sided superficial mass on 6/28/2019.  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  Patient started on IV Amphotericin.  Path +focal necrotizing granulomatous inflammation with a marked histiocytic infiltrate.    Interval Problem Update  Afebrile, but in more pain today. LE restlessness, on pramipexole.  Persistent hypomagnesemia on IV amphotericin. Plan replacement after d/w pharmacy.     Discussed with ID at length, no reported fungal elements noted on biopsies or suggestion of such per labs. Discussed d/c Ampho however will continue for now as unclear etiology for lesions and she is tolerating from a kidney perspective. Needs repeat Bx of tissue and fluid, extensive lab list ordered,  will have tissue sent out. She continues on RIPE for potential TB lesions.     Having hand pain from PIV, asked RN to remove. Has right forearm PIV for infusions.     Consultants/Specialty  Neurosurgery, Dr. Nguyen  Pulmonary Dr. Salinas  Infectious disease  IR    Code Status  DNR/DNI    Disposition  Unclear  High complexity    Review of Systems  Review of Systems   Unable to perform ROS: Severity of pain     Physical Exam  Temp:  [36.2 °C (97.2 °F)-36.7 °C (98 °F)] 36.3 °C (97.4 °F)  Pulse:  [75-99] 99  Resp:  [18-20] 19  BP: (135-162)/(71-99) 162/92  SpO2:  [92 %-98 %] 93 %    Physical Exam   Constitutional: She is oriented to person, place, and time. She appears distressed.   Gesturing to left hip w/r/t pain etiology    HENT:   Head: Normocephalic and atraumatic.   Mouth/Throat: Oropharynx is clear and moist.   Right cranio site present with staples   Eyes: EOM are normal. Right eye exhibits no discharge. Left eye exhibits no discharge. No scleral icterus.   Neck: Neck supple.   Cardiovascular: Normal rate and intact distal pulses.    No murmur heard.  Pulmonary/Chest: No stridor. No respiratory distress. She has no wheezes. She has no rales.   Abdominal: Soft. She exhibits no distension. There is no tenderness. There is no rebound.   Musculoskeletal: She exhibits no edema or tenderness.   Generalized 4/5 strength.  Right hip with palpable induration   Neurological: She is alert and oriented to person, place, and time.   No focal weakness     Skin: Skin is warm and dry. She is not diaphoretic. No pallor.   Psychiatric:   Crying  Yelling out re pain hip   Vitals reviewed.    Fluids  No intake or output data in the 24 hours ending 07/10/19 1652    Laboratory  Recent Labs      07/08/19   0341  07/09/19   0244  07/10/19   0244   WBC  6.4  6.7  9.3   RBC  3.51*  3.55*  3.97*   HEMOGLOBIN  9.9*  10.5*  11.6*   HEMATOCRIT  32.1*  32.1*  35.0*   MCV  91.5  90.4  88.2   MCH  28.2  29.6  29.2   MCHC  30.8*  32.7*  33.1*    RDW  55.9*  54.4*  52.1*   PLATELETCT  227  234  283   MPV  9.4  9.7  8.9*     Recent Labs      07/08/19   0341  07/09/19   0244  07/10/19   0244   SODIUM  137  140  139   POTASSIUM  3.8  4.3  3.5*   CHLORIDE  110  110  105   CO2  18*  20  21   GLUCOSE  80  107*  100*   BUN  14  11  7*   CREATININE  0.53  0.46*  0.49*   CALCIUM  8.4*  8.7  9.5                 Assessment/Plan  * Brain lesion- (present on admission)   Assessment & Plan    With right gluteal, multiloculated right iliac muscle fluid collections.     CHAS neg 3/15 and 5/24.    Cultures 3/29 and 4/4 neg  She has completed multiple courses of IV antibiotics  s/p drainage on 5/30/2019, cultures negative  s/p removal hardware  1, 3 beta glucan neg.      CT on 6/8 with residual abscess in the right iliacus muscle, 2.5 x 1.8 cm, slightly smaller than prior  s/p CT-guided deep bone biopsy 6/19/2019.  Cultures remain negative; path not reported     MRI 6/28 chronic discitis, diffuse OM L5, 3 and 4 cm abscesses right glute, 1.5 cm abscess or necrosis right psoterior ileum, 3.3 cm abscess right SI joint.    Status post brain biopsy - 6-28-19   Q gold +   Brain biopsy + positive focal necrotizing granulomas-may suggest mycobacterial infection. ? Fungal infxn, other cause.    Continue IV amphotericin per ID.  Has had electrolyte wasting with recurrent hypomagnesemia.  Plan additional IV magnesium  Continue schedule KCl and magnesium supplements  Monitor electrolytes closely  Continue Keppra for sz prophylaxis.   Patient with recurrent abscesses with progression, osteomyelitis and granulomas-  cultures have been negative  underlying neutrophil disorder ?  Fu neutrophil function test.     Consideration of stoping Ampho soon  ID discussing with IR regarding repeat BX of gluteal site and sending to [facility] in addition to our lab, will have performed tomorrow after discussion with IR     Granulomatous disease    Assessment & Plan    Cultures negative with recurrent,  progressive abscesses, OM.  Unclear cause.  Continue  treatment for possible infectious causes as above.  May have neutrophil function disorder with some clinical similarities to Chronic Granulomatous Disease .  X linked carriers may have variable penetrance.   Will check Neutrophil function test- dihydrorhodamine 123 screening test.      Abscess of right iliac muscle and right gluteus medius muscle- (present on admission)   Assessment & Plan    Recurrent issue since sacral fracture repair in December 2018. Has had multiple IR drainage and antibiotics. Recent cultures remain unrevealing.    s/p biopsy of the nonhealing right iliac fracture area. Showed no AFB, organism, or fungal elements.  Cultures negative.  Pain control.  Increased OxyContin, still uncontrolled and increased PRN morphine dose    Continue Lidoderm patch, PRN oxycodone, IV morphine for severe breakthrough pain  Continue with  RI PE w B6 and amphotericin per ID.       Sepsis (HCC)   Assessment & Plan    This is sepsis (without associated acute organ dysfunction).    Sepsis resolved  Continue amphotericin , RIPE started.  Monitor vital signs     Hypomagnesemia- (present on admission)   Assessment & Plan    Corrected  Scheduled magnesium  Monitor electrolytes       Hypercalcemia- (present on admission)   Assessment & Plan    Has resolved  Continue to monitor calcium     Acquired circulating anticoagulants (HCC)- (present on admission)   Assessment & Plan    Anticoagulation on hold given recent brain biopsy.  Discussed with Dr. Nguyen- Ok to start AC per surgery .  US legs negative for DVT  Start DVT pplx     Non-severe protein-calorie malnutrition (HCC)- (present on admission)   Assessment & Plan    Advance diet as tolerated  Dietary supplements       History of DVT (deep vein thrombosis)- (present on admission)   Assessment & Plan    Hold anticoagulation given recent brain biopsy and lumbar osteomyelitis.  LE duplex negative     Essential  hypertension- (present on admission)   Assessment & Plan    Continue metoprolol losartan and amlodipine  Monitor blood pressure     Chronic hyponatremia- (present on admission)   Assessment & Plan    Recurrent  Continue to monitor Na.     RLS (restless legs syndrome)- (present on admission)   Assessment & Plan    On Mirapex     Chronic pain- (present on admission)   Assessment & Plan    Continue pain management       History of breast cancer- (present on admission)   Assessment & Plan    S/p left mastectomy and breast implant.  Follow-up on intraoperative final pathology results     COPD (chronic obstructive pulmonary disease) (HCC)- (present on admission)   Assessment & Plan    Stable   RT protocol and oxygen        Discussed with multi disciplinary team plan of care.     VTE prophylaxis: SCDs, start heparin sq for now, consider warfairn    <<38186>>  I spent a total of 45 minutes of non face to face time performing additional research, reviewing medical records and labs and/or discussing plan of care with other healthcare providers. Discussion with Dr Junior regarding unknown etiology, ? Brucellosis, discussion ongoing plan of care. Extensive chart review again. Coordinated IR help with biopsies. D/W Dr Persaud.     Start time: 1030a  End time: 1115a  Total > 75

## 2019-07-11 ENCOUNTER — APPOINTMENT (OUTPATIENT)
Dept: RADIOLOGY | Facility: MEDICAL CENTER | Age: 69
DRG: 356 | End: 2019-07-11
Attending: HOSPITALIST
Payer: MEDICARE

## 2019-07-11 LAB
ERYTHROCYTE [DISTWIDTH] IN BLOOD BY AUTOMATED COUNT: 54.4 FL (ref 35.9–50)
HCT VFR BLD AUTO: 30.5 % (ref 37–47)
HGB BLD-MCNC: 9.7 G/DL (ref 12–16)
MAGNESIUM SERPL-MCNC: 1.6 MG/DL (ref 1.5–2.5)
MCH RBC QN AUTO: 28.8 PG (ref 27–33)
MCHC RBC AUTO-ENTMCNC: 31.8 G/DL (ref 33.6–35)
MCV RBC AUTO: 90.5 FL (ref 81.4–97.8)
PLATELET # BLD AUTO: 240 K/UL (ref 164–446)
PMV BLD AUTO: 8.8 FL (ref 9–12.9)
RBC # BLD AUTO: 3.37 M/UL (ref 4.2–5.4)
WBC # BLD AUTO: 7.7 K/UL (ref 4.8–10.8)

## 2019-07-11 PROCEDURE — 700111 HCHG RX REV CODE 636 W/ 250 OVERRIDE (IP): Mod: JG | Performed by: FAMILY MEDICINE

## 2019-07-11 PROCEDURE — 87075 CULTR BACTERIA EXCEPT BLOOD: CPT

## 2019-07-11 PROCEDURE — A9270 NON-COVERED ITEM OR SERVICE: HCPCS | Performed by: FAMILY MEDICINE

## 2019-07-11 PROCEDURE — 99233 SBSQ HOSP IP/OBS HIGH 50: CPT | Performed by: HOSPITALIST

## 2019-07-11 PROCEDURE — 87551 MYCOBACTERIA DNA AMP PROBE: CPT

## 2019-07-11 PROCEDURE — A9270 NON-COVERED ITEM OR SERVICE: HCPCS | Performed by: NURSE PRACTITIONER

## 2019-07-11 PROCEDURE — 99153 MOD SED SAME PHYS/QHP EA: CPT

## 2019-07-11 PROCEDURE — 99358 PROLONG SERVICE W/O CONTACT: CPT | Performed by: HOSPITALIST

## 2019-07-11 PROCEDURE — 700105 HCHG RX REV CODE 258: Performed by: FAMILY MEDICINE

## 2019-07-11 PROCEDURE — 99233 SBSQ HOSP IP/OBS HIGH 50: CPT | Performed by: INTERNAL MEDICINE

## 2019-07-11 PROCEDURE — 87556 M.TUBERCULO DNA AMP PROBE: CPT

## 2019-07-11 PROCEDURE — 87070 CULTURE OTHR SPECIMN AEROBIC: CPT

## 2019-07-11 PROCEDURE — 87206 SMEAR FLUORESCENT/ACID STAI: CPT

## 2019-07-11 PROCEDURE — 700102 HCHG RX REV CODE 250 W/ 637 OVERRIDE(OP): Performed by: FAMILY MEDICINE

## 2019-07-11 PROCEDURE — 0KBN3ZX EXCISION OF RIGHT HIP MUSCLE, PERCUTANEOUS APPROACH, DIAGNOSTIC: ICD-10-PCS | Performed by: RADIOLOGY

## 2019-07-11 PROCEDURE — 97535 SELF CARE MNGMENT TRAINING: CPT

## 2019-07-11 PROCEDURE — A9270 NON-COVERED ITEM OR SERVICE: HCPCS | Performed by: HOSPITALIST

## 2019-07-11 PROCEDURE — 83735 ASSAY OF MAGNESIUM: CPT

## 2019-07-11 PROCEDURE — 700101 HCHG RX REV CODE 250: Performed by: FAMILY MEDICINE

## 2019-07-11 PROCEDURE — 369999 HCHG MISC LAB CHARGE

## 2019-07-11 PROCEDURE — 87116 MYCOBACTERIA CULTURE: CPT

## 2019-07-11 PROCEDURE — 700102 HCHG RX REV CODE 250 W/ 637 OVERRIDE(OP): Performed by: HOSPITALIST

## 2019-07-11 PROCEDURE — 700111 HCHG RX REV CODE 636 W/ 250 OVERRIDE (IP): Performed by: HOSPITALIST

## 2019-07-11 PROCEDURE — 700111 HCHG RX REV CODE 636 W/ 250 OVERRIDE (IP)

## 2019-07-11 PROCEDURE — BR2CZZZ COMPUTERIZED TOMOGRAPHY (CT SCAN) OF PELVIS: ICD-10-PCS | Performed by: RADIOLOGY

## 2019-07-11 PROCEDURE — 36415 COLL VENOUS BLD VENIPUNCTURE: CPT

## 2019-07-11 PROCEDURE — 770001 HCHG ROOM/CARE - MED/SURG/GYN PRIV*

## 2019-07-11 PROCEDURE — 700111 HCHG RX REV CODE 636 W/ 250 OVERRIDE (IP): Performed by: RADIOLOGY

## 2019-07-11 PROCEDURE — 87102 FUNGUS ISOLATION CULTURE: CPT

## 2019-07-11 PROCEDURE — 87205 SMEAR GRAM STAIN: CPT

## 2019-07-11 PROCEDURE — 700111 HCHG RX REV CODE 636 W/ 250 OVERRIDE (IP): Performed by: FAMILY MEDICINE

## 2019-07-11 PROCEDURE — 88305 TISSUE EXAM BY PATHOLOGIST: CPT

## 2019-07-11 PROCEDURE — 700102 HCHG RX REV CODE 250 W/ 637 OVERRIDE(OP): Performed by: INTERNAL MEDICINE

## 2019-07-11 PROCEDURE — 87801 DETECT AGNT MULT DNA AMPLI: CPT

## 2019-07-11 PROCEDURE — 700112 HCHG RX REV CODE 229: Performed by: NURSE PRACTITIONER

## 2019-07-11 PROCEDURE — 700102 HCHG RX REV CODE 250 W/ 637 OVERRIDE(OP): Performed by: NURSE PRACTITIONER

## 2019-07-11 PROCEDURE — 85027 COMPLETE CBC AUTOMATED: CPT

## 2019-07-11 PROCEDURE — 87015 SPECIMEN INFECT AGNT CONCNTJ: CPT

## 2019-07-11 PROCEDURE — A9270 NON-COVERED ITEM OR SERVICE: HCPCS | Performed by: INTERNAL MEDICINE

## 2019-07-11 PROCEDURE — 88312 SPECIAL STAINS GROUP 1: CPT | Mod: 59

## 2019-07-11 RX ORDER — ONDANSETRON 2 MG/ML
4 INJECTION INTRAMUSCULAR; INTRAVENOUS PRN
Status: ACTIVE | OUTPATIENT
Start: 2019-07-11 | End: 2019-07-11

## 2019-07-11 RX ORDER — GABAPENTIN 300 MG/1
300 CAPSULE ORAL 3 TIMES DAILY
Status: DISCONTINUED | OUTPATIENT
Start: 2019-07-11 | End: 2019-07-12

## 2019-07-11 RX ORDER — PRAMIPEXOLE DIHYDROCHLORIDE 0.5 MG/1
0.5 TABLET ORAL 3 TIMES DAILY
Status: DISCONTINUED | OUTPATIENT
Start: 2019-07-11 | End: 2019-07-14

## 2019-07-11 RX ORDER — HYDRALAZINE HYDROCHLORIDE 20 MG/ML
10 INJECTION INTRAMUSCULAR; INTRAVENOUS ONCE
Status: COMPLETED | OUTPATIENT
Start: 2019-07-11 | End: 2019-07-11

## 2019-07-11 RX ORDER — FERROUS SULFATE 325(65) MG
325 TABLET ORAL
Status: DISCONTINUED | OUTPATIENT
Start: 2019-07-11 | End: 2019-08-10 | Stop reason: HOSPADM

## 2019-07-11 RX ORDER — MIDAZOLAM HYDROCHLORIDE 1 MG/ML
INJECTION INTRAMUSCULAR; INTRAVENOUS
Status: COMPLETED
Start: 2019-07-11 | End: 2019-07-11

## 2019-07-11 RX ORDER — MIDAZOLAM HYDROCHLORIDE 1 MG/ML
.5-2 INJECTION INTRAMUSCULAR; INTRAVENOUS PRN
Status: ACTIVE | OUTPATIENT
Start: 2019-07-11 | End: 2019-07-11

## 2019-07-11 RX ORDER — SODIUM CHLORIDE 9 MG/ML
500 INJECTION, SOLUTION INTRAVENOUS
Status: ACTIVE | OUTPATIENT
Start: 2019-07-11 | End: 2019-07-11

## 2019-07-11 RX ORDER — MAGNESIUM SULFATE HEPTAHYDRATE 40 MG/ML
2 INJECTION, SOLUTION INTRAVENOUS ONCE
Status: DISPENSED | OUTPATIENT
Start: 2019-07-11 | End: 2019-07-12

## 2019-07-11 RX ORDER — HYDRALAZINE HYDROCHLORIDE 20 MG/ML
INJECTION INTRAMUSCULAR; INTRAVENOUS
Status: COMPLETED
Start: 2019-07-11 | End: 2019-07-11

## 2019-07-11 RX ADMIN — CYCLOBENZAPRINE 10 MG: 10 TABLET, FILM COATED ORAL at 15:36

## 2019-07-11 RX ADMIN — Medication 1 CAPSULE: at 07:30

## 2019-07-11 RX ADMIN — POTASSIUM CHLORIDE 40 MEQ: 20 TABLET, EXTENDED RELEASE ORAL at 22:46

## 2019-07-11 RX ADMIN — PRAMIPEXOLE DIHYDROCHLORIDE 1 MG: 0.5 TABLET ORAL at 05:31

## 2019-07-11 RX ADMIN — LIDOCAINE 2 PATCH: 50 PATCH CUTANEOUS at 10:59

## 2019-07-11 RX ADMIN — AMPHOTERICIN B 285.5 MG: 50 INJECTION, POWDER, LYOPHILIZED, FOR SOLUTION INTRAVENOUS at 15:59

## 2019-07-11 RX ADMIN — SUCRALFATE 1 G: 1 SUSPENSION ORAL at 11:58

## 2019-07-11 RX ADMIN — DIPHENHYDRAMINE HYDROCHLORIDE, ZINC ACETATE: 2; .1 CREAM TOPICAL at 10:59

## 2019-07-11 RX ADMIN — POTASSIUM CHLORIDE 40 MEQ: 20 TABLET, EXTENDED RELEASE ORAL at 05:21

## 2019-07-11 RX ADMIN — GABAPENTIN 300 MG: 300 CAPSULE ORAL at 22:47

## 2019-07-11 RX ADMIN — SODIUM CHLORIDE 500 ML: 9 INJECTION, SOLUTION INTRAVENOUS at 15:00

## 2019-07-11 RX ADMIN — RIFAMPIN 300 MG: 300 CAPSULE ORAL at 05:30

## 2019-07-11 RX ADMIN — HYDRALAZINE HYDROCHLORIDE 10 MG: 20 INJECTION INTRAMUSCULAR; INTRAVENOUS at 17:34

## 2019-07-11 RX ADMIN — ETHAMBUTOL HYDROCHLORIDE 800 MG: 400 TABLET, FILM COATED ORAL at 05:33

## 2019-07-11 RX ADMIN — MIDAZOLAM HYDROCHLORIDE 1 MG: 1 INJECTION, SOLUTION INTRAMUSCULAR; INTRAVENOUS at 17:35

## 2019-07-11 RX ADMIN — GABAPENTIN 100 MG: 100 CAPSULE ORAL at 11:58

## 2019-07-11 RX ADMIN — OMEPRAZOLE 20 MG: 20 CAPSULE, DELAYED RELEASE ORAL at 05:22

## 2019-07-11 RX ADMIN — SENNOSIDES, DOCUSATE SODIUM 1 TABLET: 50; 8.6 TABLET, FILM COATED ORAL at 22:53

## 2019-07-11 RX ADMIN — MIDAZOLAM HYDROCHLORIDE 1 MG: 1 INJECTION, SOLUTION INTRAMUSCULAR; INTRAVENOUS at 17:25

## 2019-07-11 RX ADMIN — PYRIDOXINE HCL TAB 50 MG 100 MG: 50 TAB at 05:21

## 2019-07-11 RX ADMIN — ACETAMINOPHEN 650 MG: 325 TABLET, FILM COATED ORAL at 15:32

## 2019-07-11 RX ADMIN — PRAMIPEXOLE DIHYDROCHLORIDE 1 MG: 0.5 TABLET ORAL at 11:58

## 2019-07-11 RX ADMIN — LABETALOL HYDROCHLORIDE 10 MG: 5 INJECTION INTRAVENOUS at 12:50

## 2019-07-11 RX ADMIN — DOCUSATE SODIUM 100 MG: 100 CAPSULE, LIQUID FILLED ORAL at 05:25

## 2019-07-11 RX ADMIN — HYDRALAZINE HYDROCHLORIDE 10 MG: 20 INJECTION INTRAMUSCULAR; INTRAVENOUS at 06:14

## 2019-07-11 RX ADMIN — METOPROLOL TARTRATE 25 MG: 25 TABLET, FILM COATED ORAL at 05:31

## 2019-07-11 RX ADMIN — AMLODIPINE BESYLATE 10 MG: 5 TABLET ORAL at 05:22

## 2019-07-11 RX ADMIN — DEXTROSE MONOHYDRATE 30 ML: 50 INJECTION, SOLUTION INTRAVENOUS at 18:53

## 2019-07-11 RX ADMIN — PRAMIPEXOLE DIHYDROCHLORIDE 0.5 MG: 0.5 TABLET ORAL at 22:46

## 2019-07-11 RX ADMIN — LABETALOL HYDROCHLORIDE 10 MG: 5 INJECTION INTRAVENOUS at 16:02

## 2019-07-11 RX ADMIN — METOPROLOL TARTRATE 25 MG: 25 TABLET, FILM COATED ORAL at 22:47

## 2019-07-11 RX ADMIN — ISONIAZID 300 MG: 300 TABLET ORAL at 05:26

## 2019-07-11 RX ADMIN — SUCRALFATE 1 G: 1 SUSPENSION ORAL at 05:30

## 2019-07-11 RX ADMIN — OXYCODONE HYDROCHLORIDE 20 MG: 20 TABLET, FILM COATED, EXTENDED RELEASE ORAL at 22:46

## 2019-07-11 RX ADMIN — DEXTROSE MONOHYDRATE 30 ML: 50 INJECTION, SOLUTION INTRAVENOUS at 16:00

## 2019-07-11 RX ADMIN — DIPHENHYDRAMINE HCL 25 MG: 25 TABLET ORAL at 15:33

## 2019-07-11 RX ADMIN — ATORVASTATIN CALCIUM 10 MG: 10 TABLET, FILM COATED ORAL at 22:46

## 2019-07-11 RX ADMIN — CINACALCET HYDROCHLORIDE 60 MG: 30 TABLET, COATED ORAL at 05:29

## 2019-07-11 RX ADMIN — MAGNESIUM 64 MG (MAGNESIUM CHLORIDE) TABLET,DELAYED RELEASE 128 MG: at 22:45

## 2019-07-11 RX ADMIN — FENTANYL CITRATE 50 MCG: 50 INJECTION, SOLUTION INTRAMUSCULAR; INTRAVENOUS at 17:35

## 2019-07-11 RX ADMIN — FENTANYL CITRATE 50 MCG: 50 INJECTION, SOLUTION INTRAMUSCULAR; INTRAVENOUS at 17:25

## 2019-07-11 RX ADMIN — LEVETIRACETAM 500 MG: 500 TABLET, FILM COATED ORAL at 22:47

## 2019-07-11 RX ADMIN — LOSARTAN POTASSIUM 50 MG: 50 TABLET ORAL at 05:22

## 2019-07-11 RX ADMIN — RIFAMPIN 300 MG: 300 CAPSULE ORAL at 22:45

## 2019-07-11 RX ADMIN — MORPHINE SULFATE 4 MG: 4 INJECTION INTRAVENOUS at 15:52

## 2019-07-11 RX ADMIN — GABAPENTIN 100 MG: 100 CAPSULE ORAL at 05:24

## 2019-07-11 RX ADMIN — TEMAZEPAM 15 MG: 15 CAPSULE ORAL at 22:53

## 2019-07-11 RX ADMIN — MORPHINE SULFATE 4 MG: 4 INJECTION INTRAVENOUS at 02:09

## 2019-07-11 RX ADMIN — MORPHINE SULFATE 4 MG: 4 INJECTION INTRAVENOUS at 09:39

## 2019-07-11 RX ADMIN — SODIUM CHLORIDE 250 ML: 9 INJECTION, SOLUTION INTRAVENOUS at 19:15

## 2019-07-11 RX ADMIN — LEVETIRACETAM 500 MG: 500 TABLET, FILM COATED ORAL at 05:24

## 2019-07-11 RX ADMIN — OXYCODONE HYDROCHLORIDE 10 MG: 5 TABLET ORAL at 10:59

## 2019-07-11 RX ADMIN — MAGNESIUM 64 MG (MAGNESIUM CHLORIDE) TABLET,DELAYED RELEASE 128 MG: at 05:30

## 2019-07-11 RX ADMIN — OXYCODONE HYDROCHLORIDE 20 MG: 20 TABLET, FILM COATED, EXTENDED RELEASE ORAL at 05:20

## 2019-07-11 RX ADMIN — ACETAMINOPHEN 1000 MG: 500 TABLET ORAL at 22:46

## 2019-07-11 RX ADMIN — ACETAMINOPHEN 1000 MG: 500 TABLET ORAL at 05:20

## 2019-07-11 RX ADMIN — PYRAZINAMIDE 1000 MG: 500 TABLET ORAL at 05:30

## 2019-07-11 RX ADMIN — OXYCODONE HYDROCHLORIDE 10 MG: 5 TABLET ORAL at 06:18

## 2019-07-11 ASSESSMENT — COGNITIVE AND FUNCTIONAL STATUS - GENERAL
TOILETING: TOTAL
PERSONAL GROOMING: A LITTLE
DRESSING REGULAR LOWER BODY CLOTHING: A LOT
EATING MEALS: A LITTLE
DAILY ACTIVITIY SCORE: 14
SUGGESTED CMS G CODE MODIFIER DAILY ACTIVITY: CK
DRESSING REGULAR UPPER BODY CLOTHING: A LITTLE
HELP NEEDED FOR BATHING: A LOT

## 2019-07-11 ASSESSMENT — ENCOUNTER SYMPTOMS
NAUSEA: 0
ABDOMINAL PAIN: 0
WEIGHT LOSS: 1
VOMITING: 0
CONSTIPATION: 0
CHILLS: 0
MYALGIAS: 1
FEVER: 0
DIARRHEA: 0
WEAKNESS: 1

## 2019-07-11 NOTE — PROGRESS NOTES
Hospital Medicine Daily Progress Note    Date of Service  7/11/2019    Chief Complaint    68 y.o. female admitted 5/29/2019 with pelvic pain and confusion .     Hospital Course    68 y.o. female [facility] with persistent right gluteal abscesses since sacral fracture repair in December with multiple IR drainage.     Interval imaging to date:  MRI brain showed increase in brain lesions  CHAS done by Dr. Potter showed no vegetations.  MRI brain on 6/7/19 saw progression of the supra and infratentorial intra-axial nodules with edema, prompting a Neurosurgery consult.     Repeat CT on 6/17 showed no significant interval change in the size of the right iliacus muscle fluid collections, with changes in the right iliac bone, bilateral sacrum, and left iliac bone suspicious for osteomyelitis, and changes at L5-S1 suspicious for discitis/osteomyelitis, along with hyperdense right gluteal lesion, moderately suspicious for postoperative pseudoaneurysm with adjacent hematoma.      Cultures:  Pelvic abscess drainage on 5/30 with negative results.  Biopsy of the hip showed no AFB, organism, or fungal elements    Patient had re evaluation by Dr. Nguyen and she underwent right-sided craniotomy with resection of right sided superficial mass on 6/28/2019.  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  Patient started on IV Amphotericin.  Path +focal necrotizing granulomatous inflammation with a marked histiocytic infiltrate.    Interval Problem Update  Afebrile, but in more pain today. LE restlessness, on pramipexole but not effective.  Persistent hypomagnesemia on IV amphotericin, but electrolytes improving. To have IR guided bx of abscesses with extensive lab workup planned as we are still unsure what we're treating.    Neutrophil function NORMAL, r/o chronic granulomatous dz.     Consultants/Specialty  Neurosurgery, Dr. Nguyen  Pulmonary Dr. Salinas  Infectious disease  IR    Code  Status  DNR/DNI    Disposition  Unclear  High complexity  Remains inpatient  Anticipate SNF when more stable    Review of Systems  Review of Systems   Unable to perform ROS: Medical condition     Physical Exam  Temp:  [36 °C (96.8 °F)-36.7 °C (98.1 °F)] 36.4 °C (97.6 °F)  Pulse:  [] 93  Resp:  [17-20] 17  BP: (124-179)/() 138/65  SpO2:  [93 %-94 %] 93 %    Physical Exam   Constitutional: She is oriented to person, place, and time. She appears distressed.   Gesturing to LEs, uncomfortable appearing   HENT:   Head: Normocephalic and atraumatic.   Mouth/Throat: Oropharynx is clear and moist.   Right cranio site present with staples   Eyes: Conjunctivae and EOM are normal. Right eye exhibits no discharge. Left eye exhibits no discharge. No scleral icterus.   Neck: Normal range of motion. Neck supple.   Cardiovascular: Normal rate and intact distal pulses.    No murmur heard.  Pulmonary/Chest: No stridor. No respiratory distress. She has no wheezes. She has no rales.   Abdominal: Soft. She exhibits no distension. There is no tenderness. There is no rebound.   Musculoskeletal: She exhibits no edema or tenderness.   Generalized 4/5 strength.  Right hip with palpable induration   Neurological: She is alert and oriented to person, place, and time.   No focal weakness  Consistent RLS type movement LEs  No TTP   Skin: Skin is warm and dry. She is not diaphoretic. No pallor.   Brawny induration LEs   Psychiatric:   Anxious   Vitals reviewed.    Fluids    Intake/Output Summary (Last 24 hours) at 07/11/19 1526  Last data filed at 07/11/19 1200   Gross per 24 hour   Intake             1030 ml   Output                0 ml   Net             1030 ml       Laboratory  Recent Labs      07/09/19   0244  07/10/19   0244  07/11/19   0311   WBC  6.7  9.3  7.7   RBC  3.55*  3.97*  3.37*   HEMOGLOBIN  10.5*  11.6*  9.7*   HEMATOCRIT  32.1*  35.0*  30.5*   MCV  90.4  88.2  90.5   MCH  29.6  29.2  28.8   MCHC  32.7*  33.1*  31.8*    RDW  54.4*  52.1*  54.4*   PLATELETCT  234  283  240   MPV  9.7  8.9*  8.8*     Recent Labs      07/09/19   0244  07/10/19   0244   SODIUM  140  139   POTASSIUM  4.3  3.5*   CHLORIDE  110  105   CO2  20  21   GLUCOSE  107*  100*   BUN  11  7*   CREATININE  0.46*  0.49*   CALCIUM  8.7  9.5                 Assessment/Plan  * Brain lesion- (present on admission)   Assessment & Plan    With right gluteal, multiloculated right iliac muscle fluid collections.     CHAS neg 3/15 and 5/24.    Cultures 3/29 and 4/4 neg  She has completed multiple courses of IV antibiotics  s/p drainage on 5/30/2019, cultures negative  s/p removal hardware  1, 3 beta glucan neg.      CT on 6/8 with residual abscess in the right iliacus muscle, 2.5 x 1.8 cm, slightly smaller than prior  s/p CT-guided deep bone biopsy 6/19/2019.  Cultures remain negative; path not reported     MRI 6/28 chronic discitis, diffuse OM L5, 3 and 4 cm abscesses right glute, 1.5 cm abscess or necrosis right psoterior ileum, 3.3 cm abscess right SI joint.    Status post brain biopsy - 6-28-19   Q gold +   Brain biopsy + positive focal necrotizing granulomas-may suggest mycobacterial infection. ? Fungal infxn, other cause.    Continue IV amphotericin per ID.  Has had electrolyte wasting with recurrent hypomagnesemia.  Continue schedule KCl and magnesium supplements  Monitor electrolytes closely  Continue Keppra for sz prophylaxis.     Patient with recurrent abscesses with progression, osteomyelitis and granulomas-  cultures have been negative  NORMAL  neutrophil function test.     Consideration of stoping Ampho soon  ID discussing with IR regarding repeat BX of gluteal site and sending to  in addition to our lab, will have performed today after discussion with IR     Granulomatous disease    Assessment & Plan    Cultures negative with recurrent, progressive abscesses, OM.  Unclear cause.  Continue treatment for possible infectious causes as above.      NORMAL  Neutrophil function test- dihydrorhodamine 123 screening test.      Abscess of right iliac muscle and right gluteus medius muscle- (present on admission)   Assessment & Plan    Recurrent issue since sacral fracture repair in December 2018. Has had multiple IR drainage and antibiotics. Recent cultures remain unrevealing.    s/p biopsy of the nonhealing right iliac fracture area. Showed no AFB, organism, or fungal elements.  Cultures negative.  Pain control.  Increased OxyContin, still uncontrolled and increased PRN morphine dose    Continue Lidoderm patch, PRN oxycodone, IV morphine for severe breakthrough pain  Continue with  RI PE w B6 and amphotericin per ID.       Sepsis (HCC)   Assessment & Plan    This is sepsis (without associated acute organ dysfunction).    Sepsis resolved  Continue amphotericin , RIPE started.  Monitor vital signs     Hypomagnesemia- (present on admission)   Assessment & Plan    Corrected  Scheduled magnesium  Monitor electrolytes       Hypercalcemia- (present on admission)   Assessment & Plan    Has resolved  Continue to monitor calcium     RLS (restless legs syndrome)- (present on admission)   Assessment & Plan    Appears to have augmented   Giving her severe distress, no sleep  Cross titrate with incr dose gabapentin, start lowering pramipexole dose     Acquired circulating anticoagulants (HCC)- (present on admission)   Assessment & Plan    Anticoagulation on hold given recent brain biopsy.  Discussed with Dr. Nguyen- Ok to start AC per surgery .  US legs negative for DVT  Start DVT pplx     Non-severe protein-calorie malnutrition (HCC)- (present on admission)   Assessment & Plan    Advance diet as tolerated  Dietary supplements       History of DVT (deep vein thrombosis)- (present on admission)   Assessment & Plan    Hold anticoagulation given recent brain biopsy and lumbar osteomyelitis.  LE duplex negative     Essential hypertension- (present on admission)   Assessment & Plan     Continue metoprolol losartan and amlodipine  Monitor blood pressure     Chronic hyponatremia- (present on admission)   Assessment & Plan    Recurrent  Continue to monitor Na.     Chronic pain- (present on admission)   Assessment & Plan    Continue pain management       History of breast cancer- (present on admission)   Assessment & Plan    S/p left mastectomy and breast implant.  Follow-up on intraoperative final pathology results     COPD (chronic obstructive pulmonary disease) (HCC)- (present on admission)   Assessment & Plan    Stable   RT protocol and oxygen        Discussed with multi disciplinary team plan of care.     VTE prophylaxis: SCDs, start heparin sq for now, consider warfairn    <<[id number]>>  I spent a total of 35 minutes of non face to face time performing additional research, reviewing medical records and labs and/or discussing plan of care with other healthcare providers. Discussion with IR and ID. Complex patient, worsening RLS symptoms. Extensive d/w RN and CM as well.     Start time: 10am   End time: 1035am

## 2019-07-11 NOTE — DISCHARGE PLANNING
Anticipated Discharge Disposition:   SNF    Action:   Following up with POC.    Barriers to Discharge:   Medical clearance.    Plan:   Patient may discharge to [facility] as she had been accepted; transfer pending on medical clearance.

## 2019-07-11 NOTE — CARE PLAN
Problem: Communication  Goal: The ability to communicate needs accurately and effectively will improve  Pt educated on use of call light and white board for communication, no evidence of learning, will reinforce during shift.           Problem: Pain Management  Goal: Pain level will decrease to patient's comfort goal  Pain assessed Q2h. Pain medication given per orders, see MAR.

## 2019-07-11 NOTE — PLAN OF CARE (IOPOC)
I certify that patient requires continued medically neccessary care for the treatment of     Patient Active Problem List   Diagnosis   • COPD (chronic obstructive pulmonary disease) (HCC)   • History of breast cancer   • Chronic pain   • Diabetes mellitus type 2 in obese (HCC)   • GERD (gastroesophageal reflux disease)   • RLS (restless legs syndrome)   • Elevated alkaline phosphatase level   • Microcytic anemia   • Chronic hyponatremia   • Skin yeast infection   • Brain lesion   • Mass of iliopsoas muscle group   • Fever   • Pancytopenia (HCC)   • Essential hypertension   • History of DVT (deep vein thrombosis)   • Fungal infection of the groin   • Hypercalcemia   • Insomnia   • Dysphagia   • Abscess of right iliac muscle and right gluteus medius muscle   • Hypomagnesemia   • Sepsis (HCC)   • Non-severe protein-calorie malnutrition (HCC)   • Pseudoaneurysm following procedure (HCC)   • Acquired circulating anticoagulants (HCC)   • Granulomatous disease      And will remain in the hospital for an unknown period of time. Discharge plan is unknown at this time. Suspect patient will discharge to SNF when medically stable.

## 2019-07-11 NOTE — PROGRESS NOTES
2 RN skin check complete with PATRICK Burgos.   Devices in place- PIV.   Skin assessed under devices - yes.   No new potential pressure ulcers noted.   Old surgical incision to right side of head has staples SHAZIA, CDI.    Elbows are red and blanching.  Bilat heels and feet are dry, flaky, and boggy.  Bilat knees reddened but blanching.  The following interventions in place - pressure redistribution mattress, barrier cream, mepliex to bilat heels.

## 2019-07-11 NOTE — PROGRESS NOTES
Infectious Disease Progress Note    Author: Geon Junior M.D. Date & Time of service: 7/11/2019  7:26 AM    Chief Complaint:  Brain abscess, gluteal abscess  Vertebral OM     Interval History:  68 y.o. female admitted 5/29/2019 as a transfer from [facility] since 4/30/2019. + diabetes, SANTOSH of right hip with hardware, and breast cancer who was originally admitted to [facility] on 03/08/2019 for right hip and pelvic pain.  Work-up revealed a right iliopsoas lesion, gluteal abscess, and mult brain abscesses     6/24/2019 no fevers.  The hip pain is better.  No cough no shortness of breath  6/25/2019-no fevers.  The hip cultures remain negative.  She is scheduled for stereotactic biopsy on Friday.  6/26/2019 no fevers are noted.  Patient complains of back pain.  In significant distress.  6/27/2019-no fevers.  Continues to complain of pain.  No new issues overnight  6/28 AF WBC 6.6 planned for MRIs and biopsy today-somnolent  6/29 AF WBC 7.7 obtunded prelim path showed fungus on brain specimen  6/30 AF somnolent-not awakening to voice-slurred speech noted by Neurosurgery  7/1 AF more awake today; not oriented-ate about half of lunch c/o pain in joints  7/2 AF WBC 5.5 no new complaints  7/3 AF still c/o unrelenting right hip pain whenever awake-Falls asleep during exam  7/4 AF much more alert and conversant today-c/o left hip pain, unrelenting and would like parietal dressing changed. HA at surgical site.  Denies SE abx. No new neurodeficits  7/5 afebrile WBC 5.4.  Patient sleeping but arousable.  She denies any headache, nausea, vomiting.  7/6 afebrile WBC 6.5.  Patient sitting up in chair and having excruciating back pain  7/8 Pt has no specific complaints, she is mildly confused today.  She seems to be tolerating her medications with no significant lab abnormalities.  7/9 AF, O2 RA, Significant left hip pain today.  She does appear to be tolerating her antibiotics.  Ultrasound of bilateral lower extremities has  been ordered.  7/10 AF, O2 RA,  Plan for IR drainage of sacral collection soon.      Review of Systems:  Review of Systems   Constitutional: Positive for malaise/fatigue and weight loss. Negative for chills and fever.   Gastrointestinal: Negative for abdominal pain, constipation, diarrhea, nausea and vomiting.   Musculoskeletal: Positive for joint pain and myalgias.   Neurological: Positive for weakness.       Hemodynamics:  Temp (24hrs), Av.4 °C (97.5 °F), Min:36.1 °C (97 °F), Max:36.7 °C (98.1 °F)  Temperature: 36.7 °C (98.1 °F)  Pulse  Av  Min: 49  Max: 125   Blood Pressure : 156/85       Physical Exam:  Physical Exam   Constitutional: She is oriented to person, place, and time. She appears well-developed.   thin   HENT:   Head: Normocephalic.   Site of prior brain biopsy with staples in place, no sign of infection   Eyes: Pupils are equal, round, and reactive to light. Conjunctivae and EOM are normal.   Cardiovascular: Normal rate, regular rhythm and normal heart sounds.    Pulmonary/Chest: Effort normal and breath sounds normal.   Abdominal: Soft. Bowel sounds are normal. She exhibits no distension. There is no tenderness. There is no rebound and no guarding.   Musculoskeletal: She exhibits no edema.   Neurological: She is alert and oriented to person, place, and time.   Skin: Skin is warm and dry.   Psychiatric: She has a normal mood and affect. Her behavior is normal.       Meds:    Current Facility-Administered Medications:   •  diphenhydrAMINE  •  morphine injection  •  heparin  •  oxyCODONE CR  •  potassium chloride SA  •  magnesium chloride  •  pyridoxine  •  levETIRAcetam  •  isoniazid  •  riFAMPin  •  ethambutol  •  pyrazinamide  •  acetaminophen  •  Pharmacy Consult Request  •  labetalol  •  hydrALAZINE  •  docusate sodium  •  senna-docusate  •  Pharmacy  •  NS **AND** acetaminophen **AND** diphenhydrAMINE **AND** D5W **AND** amphotericin b liposomal (AMBISOME) IV **AND** D5W **AND** NS  •   NS  •  gabapentin  •  lactobacillus rhamnosus  •  oxyCODONE immediate-release  •  sucralfate  •  acetaminophen  •  pramipexole  •  lidocaine  •  temazepam  •  cyclobenzaprine  •  Respiratory Care per Protocol  •  amLODIPine  •  cinacalcet  •  losartan  •  atorvastatin  •  omeprazole  •  metoprolol  •  ondansetron  •  ondansetron    Labs:  Recent Labs      07/09/19   0244  07/10/19   0244  07/11/19   0311   WBC  6.7  9.3  7.7   RBC  3.55*  3.97*  3.37*   HEMOGLOBIN  10.5*  11.6*  9.7*   HEMATOCRIT  32.1*  35.0*  30.5*   MCV  90.4  88.2  90.5   MCH  29.6  29.2  28.8   RDW  54.4*  52.1*  54.4*   PLATELETCT  234  283  240   MPV  9.7  8.9*  8.8*     Recent Labs      07/09/19   0244  07/10/19   0244   SODIUM  140  139   POTASSIUM  4.3  3.5*   CHLORIDE  110  105   CO2  20  21   GLUCOSE  107*  100*   BUN  11  7*     Recent Labs      07/09/19   0244  07/10/19   0244   ALBUMIN  3.3  3.9   TBILIRUBIN  0.4  0.6   ALKPHOSPHAT  373*  360*   TOTPROTEIN  6.5  7.6   ALTSGPT  15  13   ASTSGOT  28  28   CREATININE  0.46*  0.49*       Imaging:  Ct-needle Bx-deep Bone    Result Date: 6/20/2019 6/19/2019 9:12 AM HISTORY/REASON FOR EXAM:  Concern for right hip osteomyelitis. TECHNIQUE/EXAM DESCRIPTION: Right hip deep bone biopsy with CT guidance. Low dose optimization technique was utilized for this CT exam including automated exposure control and adjustment of the mA and/or kV according to patient size. PROCEDURE: Informed consent was obtained. Conscious sedation with 25 mcg Fentanyl and 1 mg Versed was administered during the procedure with appropriate continuous patient monitoring by the radiology nurse. Sedation duration: 30 minutes. Localizing CT images were obtained with the patient in prone position. The skin was prepped with ChloraPrep and draped in a sterile fashion. Following local anesthesia with 1% Lidocaine, a 13 G guiding needle was placed and needle position confirmed with CT. Using coaxial technique, an 14 G core biopsy  needle was placed into the target lesion in the right posterior ilium and needle position confirmed with CT. A total of 2 samples were obtained in this fashion and submitted in saline for microbiology. The guiding needle was removed and limited CT images obtained through the biopsy site show no evidence of significant parenchymal hemorrhage. The patient tolerated the procedure well. COMPARISON: None available. COMPLICATIONS: No immediate complications. FINDINGS: The localizing CT images show satisfactory needle position within the target location.     CT GUIDED RIGHT ILIUM DEEP BONE BIOPSY. SAMPLES WERE SENT TO MICROBIOLOGY FOR ANALYSIS.    Ct-pelvis With    Result Date: 6/17/2019 6/17/2019 7:23 AM HISTORY/REASON FOR EXAM:  Pelvic abscess TECHNIQUE/EXAM DESCRIPTION:  CT pelvis without IV contrast.   Sequential axial CT images were obtained from the lower lumbar spine to the proximal femurs following the administration of 100 cc Omnipaque 350 intravenous contrast. Low dose optimization technique was utilized for this CT exam including automated exposure control and adjustment of the mA and/or kV according to patient size. COMPARISON:  None FINDINGS: The visualized portions of the small bowel and colon appear within normal limits. The bladder is within normal limits. Inferior RIGHT iliacus fluid collection now measures 1.8 x 2.1 cm, previously 1.8 x 2.5 cm. More cephalad RIGHT iliacus fluid collection now measures 1.2 x 2.1 cm, previously 2 x 1.5 cm. Unhealed fractures with irregular margins or reflux demonstrated in the RIGHT iliac bone and the sacrum with a screw track extending into the LEFT iliac bone. There are areas of lucency irregularity in the LEFT iliac bone. There is anterolisthesis of L5 on S1 with irregular lucencies adjacent to the narrowed disc space. There is a 2 x 1.2 cm hyperdense area in the RIGHT gluteal musculature on axial image 14 of series 2. There is overlying soft tissue edema. There are  mildly prominent BILATERAL inguinal lymph nodes.     1.  No significant interval change in the size of the RIGHT iliac muscle fluid collections 2.  Hyperdense RIGHT gluteal lesion moderately suspicious for pseudoaneurysm with adjacent hematoma. 3.  Changes in the RIGHT iliac bone, BILATERAL sacrum and LEFT iliac bone suspicious for osteomyelitis 4.  Changes at L5-S1 suspicious for discitis osteomyelitis    Ir-us Guided Piv    Result Date: 7/8/2019  EXAMINATION:                                                                    HISTORY/REASON FOR EXAM:  Ultrasound Guided PIV.  TECHNIQUE/EXAM DESCRIPTION AND NUMBER OF VIEWS:  Peripheral IV insertion with ultrasound guidance.  The procedure was prepared using maximal sterile barrier technique including sterile gown, mask, cap, and donning of sterile gloves following appropriate hand hygiene and/or sterile scrub. Patient skin site was prepped with 2% Chlorhexidine solution.   FINDINGS: Peripheral IV insertion with Ultrasound Guidance was performed by qualified imaging nursing staff without the assistance of a Radiologist.      Ultrasound-guided PERIPHERAL IV INSERTION performed by qualified nursing staff as above.     Mr-brain-with & W/o    Result Date: 6/22/2019 6/22/2019 10:48 AM HISTORY/REASON FOR EXAM:  Headache, new, meningitis or encephalitis suspected. TECHNIQUE/EXAM DESCRIPTION: MRI of the brain without and with contrast, with diffusion. The study was performed on a [employer] Signa 1.5 Miranda MRI scanner. Spoiled-GRASS sagittal, thin-section T2 axial, T1 coronal, T1 postcontrast coronal, T1 postcontrast axial, FLAIR coronal and diffusion-weighted axial images were obtained of the whole brain. 6 mL Gadavist contrast were administered intravenously. COMPARISON:  6/8/2019. FINDINGS: There is a pattern of mild cerebral atrophy manifest as prominence of sulcal markings over the convexities and vertex along with mild ventriculomegaly. The bony calvaria are intact. There is  no evidence of extra-axial fluid collection or intracranial mass effect. Innumerable supra and infratentorial enhancing nodules are again noted. A lesion in the right thalamus appears somewhat more prominent when compared to the previous study measuring approximately 8 mm, previously approximately 5 mm. A lesion in the right frontal operculum gray-white junction demonstrates increased enhancement but overall unchanged size. A lesion in the right posterior hippocampus appears more somewhat larger and with increased enhancement. Enhancement. Appear to demonstrate associated diffusion restriction. There is an underlying pattern of mild supratentorial white matter disease with scattered foci of bright T2 and FLAIR signal in the subcortical and deep white matter of both hemispheres consistent with small vessel ischemic change versus demyelination or gliosis. There are normal flow voids in the cavernous carotid arteries and M1 segments. There are normal flow voids in the distal vertebral basilar system. There are normal flow voids in the dural venous sinuses. The visualized paranasal sinuses and mastoid air cells appear clear.     1.  Innumerable supra and infratentorial enhancing nodules are again noted throughout the brain parenchyma with multiple lesions appearing somewhat larger and with increased enhancement. No associated diffusion restriction. Unchanged differential considerations including bacterial or fungal infection versus metastatic disease. 2.  Mild diffuse cerebral substance loss. 3.  Mild microangiopathic ischemic change versus demyelination or gliosis.    Mr-lumbar Spine-with & W/o    Result Date: 6/28/2019 6/28/2019 9:56 AM HISTORY/REASON FOR EXAM:  Back pain or radiculopathy, cancer or infection suspected. TECHNIQUE/EXAM DESCRIPTION: MRI of the lumbar spine without and with contrast. The study was performed on a [employer] 1.5 Miranda MRI scanner. T1 sagittal, T2 fast spin-echo sagittal, and T2 axial  images were obtained of the lumbar spine. T1 post-contrast fat-suppressed sagittal images were obtained. Optional T2 fat-suppressed sagittal and T1 post-contrast axial images may be obtained. 6 mL Gadavist contrast was administered intravenously. COMPARISON:  MRI scan of the lumbar spine 3/26/2019 FINDINGS: There is a grade 2-3 spondylolisthesis at the L5-S1 level with bilateral spondylolysis of pars interarticularis. There is been interval removal of the posterior fixation hardware since previous exam. There is diffuse decreased T1 signal intensity throughout the majority of the L5 vertebral body and the first and second sacral segments. Further there is heterogeneous increased T2 signal intensity and enhancement in these regions. There is also evidence for enhancing soft tissue anterior to the L5 vertebral body and first through third sacral segments. There are elliptical nonenhancing areas in this region of abnormal enhancing soft tissue anterior to the L5-S1 disc space and first sacral segment. Additionally there are ovoid nonenhancing areas in  the first and third sacral segments. There is a 4 x 2.1 cm somewhat ovoid fluid signal intensity collection lateral to the right side of the iliac bone involving the right gluteal musculature. Additionally there is a screw tract coursing transversely through the iliac bones and sacrum which  has heterogeneous enhancement. There is enhancing soft tissue signal intensity extending from the screw tract in the right ilium into the right-sided gluteal soft tissues. There is also an ovoid area of decreased T1 and increased T2 signal intensity in the right posterior ilium. There is a 3.3 cm multiloculated fluid signal intensity collection involving the right sacroiliac joint. The conus is normal in position and signal. There is no abnormal cord enhancement. There is mild to moderate disc space narrowing at the L5-S1 level which has evidence of increased T2 signal intensity and  enhancement. At T12-L1 through L4-5 disc height and signal is normal. Level specific findings: L5-S1 level minimal annular bulging. Severe bilateral neural foraminal stenosis secondary to the spondylolisthesis. L4-5 level minimal posterior spurring and annular bulging. L3-4 level minimal annular bulging. L2-3 level minimal annular bulging. L1-2 level minimal annular bulging.     1.  Grade 2-3 spondylolisthesis at the L5-S1 level with bilateral spondylolysis of the pars interarticularis. This causes severe bilateral neural foraminal stenosis at this level. 2.  Interval removal of posterior fusion hardware at the lumbosacral junction. 3.  Diffuse osteomyelitis involving the L5 vertebral body and the first 3 sacral segments. 4.  Chronic discitis at the L5-S1 level with anterior paraspinous abscess at this level and anterior to the S1 sacral segment. 5.  Smaller intraosseous bone bone abscesses or areas of necrosis noted in the sacrum. 6.  There is also evidence of osteomyelitis involving the jose antonio and sacrum bilaterally along the course of the previous sacral fixation screw. There is a large 4 cm abscess noted in the right gluteal soft tissues in a smaller 3 cm chronic appearing abscess in the left gluteal musculature. 7.  There is a 1.5 cm intraosseous abscess or area of necrosis in the right posterior ilium. 8.  There is a 3.3 cm centimeters multiloculated abscess anterior to the right sacroiliac joint.    Us-extremity Venous Lower Bilat    Result Date: 7/9/2019   Vascular Laboratory  CONCLUSIONS  Normal bilateral superficial and deep venous examination of the lower  extremities.  MARLENA CLIFFORD  Exam Date:     07/09/2019 11:10  Room #:     Inpatient  Priority:     Routine  Ht (in):             Wt (lb):  Ordering Physician:        APRIL MERA  Referring Physician:       [id number]  Sonographer:               Sumi Sales RVT  Study Type:                Complete Bilateral  Technical Quality:         Adequate   "Age:    68    Gender:     F  MRN:    [id number]  :    1950      BSA:  Indications:     Personal history of venous thrombosis and embolism  CPT Codes:       58682  ICD Codes:       Z86.71  History:         Per physician order \"history of reported DVT\", patient is                   poor historian. No previous duplex on file. DVT.  Limitations:  PROCEDURES:  Bilateral lower extremity venous duplex imaging.  The following venous structures were evaluated: common femoral, profunda  femoral, greater saphenous, femoral, popliteal , peroneal and posterior  tibial veins.  Serial compression, augmentation maneuvers,  color and spectral Doppler  flow evaluations were performed.  FINDINGS:  Bilateral lower extremities -  Complete color filling and compressibility with normal venous flow dynamics  including spontaneous flow, response to augmentation maneuvers, and  respiratory phasicity.  The peroneal and posterior tibial veins are difficult to assess for  compressibility, but flow response to augmentation is demonstrated.  Donovan Finley  (Electronically Signed)  Final Date:      2019                   12:51    Us-extremity Non Vascular Unilateral Right    Result Date: 2019 2:45 PM HISTORY/REASON FOR EXAM:  hx of right hip hardware removal 6/15; swelling Evaluate right hip for fluid collection. TECHNIQUE/EXAM DESCRIPTION AND NUMBER OF VIEWS: Limited ultrasound of the right hip COMPARISON: 2019 FINDINGS: No right hip joint effusion. No soft tissue fluid collection.     No right hip joint effusion. No soft tissue fluid collection.    Mr-stealth Brain With & W/o    Result Date: 2019 9:57 AM HISTORY/REASON FOR EXAM:  Brain metastasis follow-up TECHNIQUE/EXAM DESCRIPTION AND NUMBER OF VIEWS: MRI of the brain without and with contrast, Stealth protocol. Fiducial markers for the Stealth stereotactic system were placed on the scalp. Thin-section 2 mm T2 fast spin-echo true axial " images were obtained of the whole brain. Thin-section 1.5 mm T1-weighted postcontrast axial 3D BRAVO images were obtained of the whole brain. 3D reconstructions in the Coronal and Sagittal planes were generated from the axial 3D BRAVO postcontrast sequence. The study was performed on a [employer] Signa 1.5 Miranda MRI scanner. 6 mL Gadavist contrast was administered intravenously. COMPARISON:  MRI scans brain 3/7/2019 FINDINGS: There are innumerable varying size nodular enhancing lesions throughout the brain parenchyma. Many of these lesions are at the gray-white junction but there are multiple lesions arising in the region of the basal ganglia. The largest lesion is in the right thalamus and measures 9 mm in greatest diameter. There is a moderate amount of surrounding increased T2 signal intensity in the right thalamus. This lesion has increased in size since previous exam. The majority of the lesions have not changed significantly since previous exam. The calvariae are unremarkable. There are no extra-axial fluid collections. The ventricular system and basal cisterns are mild to moderately prominent. There is mild-to-moderate prominence of cortical sulci and gyri. There are no mass effects or shift of  midline structures. There are no hemorrhagic lesions. Vascular flow voids in the vertebrobasilar and carotid arteries, Stebbins of Rich, and dural venous sinuses are intact. The paranasal sinuses and mastoids in the field of view are unremarkable.     1.  Innumerable subcentimeter brain metastases, many at the gray-white junction but also multiple noted throughout the basal ganglia and brainstem. 2.  Largest index lesion is noted in the right thalamus and has increased in size since previous exam and currently measures 9 mm and has a moderate amount of surrounding vasogenic edema.      Micro:  Results     ** No results found for the last 168 hours. **          Assessment:  Active Hospital Problems    Diagnosis   • *Brain  "lesion [G93.9]   • Granulomatous disease  [L92.9]   • Abscess of right iliac muscle and right gluteus medius muscle [L02.91]   • Pseudoaneurysm following procedure (HCC) [I97.89, I72.9]   • Sepsis (HCC) [A41.9]   • Hypomagnesemia [E83.42]   • Hypercalcemia [E83.52]   • Acquired circulating anticoagulants (HCC) [D68.318]   • Non-severe protein-calorie malnutrition (HCC) [E46]   • History of DVT (deep vein thrombosis) [Z86.718]   • Essential hypertension [I10]   • Chronic hyponatremia [E87.1]   • History of breast cancer [Z85.3]   • COPD (chronic obstructive pulmonary disease) (HCC) [J44.9]   • Chronic pain [G89.29]   • RLS (restless legs syndrome) [G25.81]     Interval 24 hour assessment:    Events- plan for IR biopsy of pelvis today   AF, O2 RA,    Labs reviewed  Studies reviewed  Micro reviewed    Pt continued on RIPE and ampho.  She is tolerating well overall, requiring some mag and potassium replacement.  She is complaining of severe pain in left hip again today.  Plan for biopsy later today.     Assessment/Plan:     Brain abscesses   Query CNS fungal infection vs mycobacterial or other   MRI 4/23 \"supra and infratentorial brain parenchyma. Decrease in the size of the lesions. Interval reduction in the extent of the surrounding white matter edema consistent with improvement/treatment response of the most of the multifocal brain abscesses.  MRI 5/21 showed innumerable microabscesses vs mets  Abx (vancomycin, cefepime and Flagyl) discontinued on 5/23 after ~8 weeks of treatment-developed new fevers on 6/4  MRI on 6/8 with interval progression of supra and infratentorial intra--axial nodules and associated edema.  New right thalamus lesion. Radiology favors infection over neoplasm though both remain considerations (had been off abx)  Mult blood cultures neg  CHAS neg 3/15 and 5/24  MRI 6/22 worsening CNS lesions  S/p right craniotomy and resection of mass with biopsy on 6/28. Biopsy-per note fungal appearing elements " seen per Neurosurgery-discussed with pathologist who examined the frozen section and there were no fungal appearing elements.   +necrotizing granulomas. All stains negative in EPIC  Fungal culture- negative to date  Bacterial cultures-negative to date  AFB culture-in progress  Pathology- focal necrotizing granulomatous inflammation; no fungal elements or acid-fast bacilli on stains.  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  Still with specimen in paraffin block, formalin was used   --- Follow-up brain biopsy specimen sent to [facility] for PCR testing, bacterial, fungal, AFB   --- Follow-up serologic testing sent out to [facility]- Brucella culture and ab, Coxiella ab, Histo ab serum & antigen urine, Blasto antigen urine    --- Discussed MRI findings with radiologist and there is easily obtained tissue/fluid in the sacrum and area that has been worsening despite therapy, per radiology images most consistent to TB or Brucellosis  -Pelvic biopsyon 7/11 - fluid drainage and TISSUE specimen to be sent to [facility] again for AFB, bacterial and fungal PCR- have alerted micro and send out lab    --- Also send biopsy specimens to [facility] for review for malignant cells, stains for fungal and AFB    --- Send  "biopsy specimen to [facility] micro for bacterial gram stain and culture, fungal smear and culture, AFB stain and culture- will instruct micro to hold for 14 days          Gluteal and iliopsoas abscess   OM L5, S1-3, new this admission  Recurrent abscesses, additional work  Adjacent to hardware in right hip (placed 12/17/18)  CT 4/23 \"Fluid collections within the right gluteal and iliacis muscles.. The collection within the right gluteal muscle has unchanged while the fluid collection within the right iliacis muscle is increased somewhat in size.\" 2.7 cm  Cultures 3/29 and 4/4 neg  MRI on 5/20/2019 - interval decrease in collection in R gluteal muscle and persistent multiloculated collection in R iliacus muscle.   CT 5/28 no change  s/p drainage on 5/30/2019-cultures negative  S/p removal hardware 6/5-no cultures done  1, 3 beta glucan neg  CT on 6/8 with residual abscess in the right iliacus muscle, 2.5 x 1.8 cm, slightly smaller than prior  s/p CT-guided deep bone biopsy 6/19/2019.  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    "

## 2019-07-11 NOTE — PROGRESS NOTES
Received report assumed care.  Rounding on pt, pt confused ripped out IV, IV antifungal medication stopped until IV access can get established.   2000-Reestablished IV access.

## 2019-07-11 NOTE — PROGRESS NOTES
Used [employer] language line [place] for interpretation.  Maltese interpretor interpreted orientation questions to pt, pt spoke but when RN asked what pt was saying interpretor states he can not understand anything the pt is saying. Alert, extremely restless does not follow commands.  No family is at bedside. VSS. Pt points to back/buttocks, grimaces and moans, will give pain meds. Oral medications given in applesauce. HERNANDEZ, restless repositions self in bed.  Incontinent of urine, two assist with changing linens.  Bed alarm in place.  Does not call appropriately. Incision to right side of head has staples KATE MCCRAY.  Pt updated on POC, needs met and questions answered. Call light within reach and working properly.

## 2019-07-12 ENCOUNTER — APPOINTMENT (OUTPATIENT)
Dept: RADIOLOGY | Facility: MEDICAL CENTER | Age: 69
DRG: 356 | End: 2019-07-12
Attending: HOSPITALIST
Payer: MEDICARE

## 2019-07-12 LAB
ANION GAP SERPL CALC-SCNC: 6 MMOL/L (ref 0–11.9)
BUN SERPL-MCNC: 8 MG/DL (ref 8–22)
CALCIUM SERPL-MCNC: 9.6 MG/DL (ref 8.5–10.5)
CHLORIDE SERPL-SCNC: 110 MMOL/L (ref 96–112)
CO2 SERPL-SCNC: 22 MMOL/L (ref 20–33)
CREAT SERPL-MCNC: 0.59 MG/DL (ref 0.5–1.4)
ERYTHROCYTE [DISTWIDTH] IN BLOOD BY AUTOMATED COUNT: 56.4 FL (ref 35.9–50)
GLUCOSE SERPL-MCNC: 82 MG/DL (ref 65–99)
GRAM STN SPEC: NORMAL
HCT VFR BLD AUTO: 28.1 % (ref 37–47)
HGB BLD-MCNC: 9 G/DL (ref 12–16)
MCH RBC QN AUTO: 29.4 PG (ref 27–33)
MCHC RBC AUTO-ENTMCNC: 32 G/DL (ref 33.6–35)
MCV RBC AUTO: 91.8 FL (ref 81.4–97.8)
PATHOLOGY CONSULT NOTE: NORMAL
PLATELET # BLD AUTO: 207 K/UL (ref 164–446)
PMV BLD AUTO: 8.8 FL (ref 9–12.9)
POTASSIUM SERPL-SCNC: 4 MMOL/L (ref 3.6–5.5)
RBC # BLD AUTO: 3.06 M/UL (ref 4.2–5.4)
RHODAMINE-AURAMINE STN SPEC: NORMAL
SIGNIFICANT IND 70042: NORMAL
SIGNIFICANT IND 70042: NORMAL
SITE SITE: NORMAL
SITE SITE: NORMAL
SODIUM SERPL-SCNC: 138 MMOL/L (ref 135–145)
SOURCE SOURCE: NORMAL
SOURCE SOURCE: NORMAL
TEST NAME 95000: NORMAL
UNCODED TEST RESULT 95040: NORMAL
WBC # BLD AUTO: 7.7 K/UL (ref 4.8–10.8)

## 2019-07-12 PROCEDURE — 700111 HCHG RX REV CODE 636 W/ 250 OVERRIDE (IP): Performed by: HOSPITALIST

## 2019-07-12 PROCEDURE — 700105 HCHG RX REV CODE 258: Performed by: FAMILY MEDICINE

## 2019-07-12 PROCEDURE — 51798 US URINE CAPACITY MEASURE: CPT

## 2019-07-12 PROCEDURE — 700102 HCHG RX REV CODE 250 W/ 637 OVERRIDE(OP): Performed by: FAMILY MEDICINE

## 2019-07-12 PROCEDURE — 85027 COMPLETE CBC AUTOMATED: CPT

## 2019-07-12 PROCEDURE — 97530 THERAPEUTIC ACTIVITIES: CPT

## 2019-07-12 PROCEDURE — A9270 NON-COVERED ITEM OR SERVICE: HCPCS | Performed by: FAMILY MEDICINE

## 2019-07-12 PROCEDURE — 700112 HCHG RX REV CODE 229: Performed by: NURSE PRACTITIONER

## 2019-07-12 PROCEDURE — 80048 BASIC METABOLIC PNL TOTAL CA: CPT

## 2019-07-12 PROCEDURE — A9270 NON-COVERED ITEM OR SERVICE: HCPCS | Performed by: HOSPITALIST

## 2019-07-12 PROCEDURE — A9270 NON-COVERED ITEM OR SERVICE: HCPCS | Performed by: INTERNAL MEDICINE

## 2019-07-12 PROCEDURE — 700111 HCHG RX REV CODE 636 W/ 250 OVERRIDE (IP): Mod: JG | Performed by: FAMILY MEDICINE

## 2019-07-12 PROCEDURE — 700101 HCHG RX REV CODE 250: Performed by: FAMILY MEDICINE

## 2019-07-12 PROCEDURE — 700102 HCHG RX REV CODE 250 W/ 637 OVERRIDE(OP): Performed by: HOSPITALIST

## 2019-07-12 PROCEDURE — 74018 RADEX ABDOMEN 1 VIEW: CPT

## 2019-07-12 PROCEDURE — 99233 SBSQ HOSP IP/OBS HIGH 50: CPT | Performed by: HOSPITALIST

## 2019-07-12 PROCEDURE — 700102 HCHG RX REV CODE 250 W/ 637 OVERRIDE(OP): Performed by: INTERNAL MEDICINE

## 2019-07-12 PROCEDURE — A9270 NON-COVERED ITEM OR SERVICE: HCPCS | Performed by: NURSE PRACTITIONER

## 2019-07-12 PROCEDURE — 92526 ORAL FUNCTION THERAPY: CPT

## 2019-07-12 PROCEDURE — 36415 COLL VENOUS BLD VENIPUNCTURE: CPT

## 2019-07-12 PROCEDURE — 770001 HCHG ROOM/CARE - MED/SURG/GYN PRIV*

## 2019-07-12 PROCEDURE — 99233 SBSQ HOSP IP/OBS HIGH 50: CPT | Performed by: INTERNAL MEDICINE

## 2019-07-12 RX ORDER — AMOXICILLIN 250 MG
2 CAPSULE ORAL 2 TIMES DAILY
Status: DISCONTINUED | OUTPATIENT
Start: 2019-07-12 | End: 2019-08-10 | Stop reason: HOSPADM

## 2019-07-12 RX ORDER — GABAPENTIN 400 MG/1
400 CAPSULE ORAL 3 TIMES DAILY
Status: DISCONTINUED | OUTPATIENT
Start: 2019-07-12 | End: 2019-08-10 | Stop reason: HOSPADM

## 2019-07-12 RX ORDER — POLYETHYLENE GLYCOL 3350 17 G/17G
1 POWDER, FOR SOLUTION ORAL
Status: DISCONTINUED | OUTPATIENT
Start: 2019-07-12 | End: 2019-07-30

## 2019-07-12 RX ORDER — HYDROMORPHONE HYDROCHLORIDE 1 MG/ML
1 INJECTION, SOLUTION INTRAMUSCULAR; INTRAVENOUS; SUBCUTANEOUS ONCE
Status: DISCONTINUED | OUTPATIENT
Start: 2019-07-12 | End: 2019-07-12

## 2019-07-12 RX ADMIN — OXYCODONE HYDROCHLORIDE 20 MG: 20 TABLET, FILM COATED, EXTENDED RELEASE ORAL at 18:48

## 2019-07-12 RX ADMIN — SUCRALFATE 1 G: 1 SUSPENSION ORAL at 12:10

## 2019-07-12 RX ADMIN — ACETAMINOPHEN 1000 MG: 500 TABLET ORAL at 18:46

## 2019-07-12 RX ADMIN — PRAMIPEXOLE DIHYDROCHLORIDE 0.5 MG: 0.5 TABLET ORAL at 13:24

## 2019-07-12 RX ADMIN — OXYCODONE HYDROCHLORIDE 5 MG: 5 TABLET ORAL at 09:12

## 2019-07-12 RX ADMIN — METOPROLOL TARTRATE 25 MG: 25 TABLET, FILM COATED ORAL at 05:51

## 2019-07-12 RX ADMIN — POTASSIUM CHLORIDE 40 MEQ: 20 TABLET, EXTENDED RELEASE ORAL at 18:47

## 2019-07-12 RX ADMIN — ATORVASTATIN CALCIUM 10 MG: 10 TABLET, FILM COATED ORAL at 18:47

## 2019-07-12 RX ADMIN — SUCRALFATE 1 G: 1 SUSPENSION ORAL at 18:46

## 2019-07-12 RX ADMIN — SODIUM CHLORIDE 250 ML: 9 INJECTION, SOLUTION INTRAVENOUS at 21:34

## 2019-07-12 RX ADMIN — PYRIDOXINE HCL TAB 50 MG 100 MG: 50 TAB at 05:51

## 2019-07-12 RX ADMIN — OMEPRAZOLE 20 MG: 20 CAPSULE, DELAYED RELEASE ORAL at 05:51

## 2019-07-12 RX ADMIN — FERROUS SULFATE TAB 325 MG (65 MG ELEMENTAL FE) 325 MG: 325 (65 FE) TAB at 08:50

## 2019-07-12 RX ADMIN — AMPHOTERICIN B 285.5 MG: 50 INJECTION, POWDER, LYOPHILIZED, FOR SOLUTION INTRAVENOUS at 16:19

## 2019-07-12 RX ADMIN — CINACALCET HYDROCHLORIDE 60 MG: 30 TABLET, COATED ORAL at 05:51

## 2019-07-12 RX ADMIN — METOPROLOL TARTRATE 25 MG: 25 TABLET, FILM COATED ORAL at 18:47

## 2019-07-12 RX ADMIN — MAGNESIUM 64 MG (MAGNESIUM CHLORIDE) TABLET,DELAYED RELEASE 128 MG: at 05:50

## 2019-07-12 RX ADMIN — PRAMIPEXOLE DIHYDROCHLORIDE 0.5 MG: 0.5 TABLET ORAL at 05:50

## 2019-07-12 RX ADMIN — PYRAZINAMIDE 1000 MG: 500 TABLET ORAL at 05:49

## 2019-07-12 RX ADMIN — GABAPENTIN 400 MG: 400 CAPSULE ORAL at 12:10

## 2019-07-12 RX ADMIN — SENNOSIDES, DOCUSATE SODIUM 2 TABLET: 50; 8.6 TABLET, FILM COATED ORAL at 08:50

## 2019-07-12 RX ADMIN — DEXTROSE MONOHYDRATE 30 ML: 50 INJECTION, SOLUTION INTRAVENOUS at 20:48

## 2019-07-12 RX ADMIN — CYCLOBENZAPRINE 10 MG: 10 TABLET, FILM COATED ORAL at 12:10

## 2019-07-12 RX ADMIN — DIPHENHYDRAMINE HCL 25 MG: 25 TABLET ORAL at 15:26

## 2019-07-12 RX ADMIN — SODIUM CHLORIDE 500 ML: 9 INJECTION, SOLUTION INTRAVENOUS at 15:27

## 2019-07-12 RX ADMIN — ISONIAZID 300 MG: 300 TABLET ORAL at 05:51

## 2019-07-12 RX ADMIN — HEPARIN SODIUM 5000 UNITS: 5000 INJECTION, SOLUTION INTRAVENOUS; SUBCUTANEOUS at 18:47

## 2019-07-12 RX ADMIN — ACETAMINOPHEN 1000 MG: 500 TABLET ORAL at 05:50

## 2019-07-12 RX ADMIN — GABAPENTIN 400 MG: 400 CAPSULE ORAL at 18:46

## 2019-07-12 RX ADMIN — SUCRALFATE 1 G: 1 SUSPENSION ORAL at 05:49

## 2019-07-12 RX ADMIN — MAGNESIUM 64 MG (MAGNESIUM CHLORIDE) TABLET,DELAYED RELEASE 128 MG: at 18:46

## 2019-07-12 RX ADMIN — Medication 1 CAPSULE: at 08:50

## 2019-07-12 RX ADMIN — PRAMIPEXOLE DIHYDROCHLORIDE 0.5 MG: 0.5 TABLET ORAL at 18:48

## 2019-07-12 RX ADMIN — POTASSIUM CHLORIDE 40 MEQ: 20 TABLET, EXTENDED RELEASE ORAL at 05:50

## 2019-07-12 RX ADMIN — RIFAMPIN 300 MG: 300 CAPSULE ORAL at 05:50

## 2019-07-12 RX ADMIN — LEVETIRACETAM 500 MG: 500 TABLET, FILM COATED ORAL at 05:51

## 2019-07-12 RX ADMIN — LEVETIRACETAM 500 MG: 500 TABLET, FILM COATED ORAL at 18:47

## 2019-07-12 RX ADMIN — ACETAMINOPHEN 650 MG: 325 TABLET, FILM COATED ORAL at 15:26

## 2019-07-12 RX ADMIN — LOSARTAN POTASSIUM 50 MG: 50 TABLET ORAL at 05:51

## 2019-07-12 RX ADMIN — SODIUM CHLORIDE: 9 INJECTION, SOLUTION INTRAVENOUS at 10:12

## 2019-07-12 RX ADMIN — ACETAMINOPHEN 500 MG: 500 TABLET ORAL at 12:10

## 2019-07-12 RX ADMIN — OXYCODONE HYDROCHLORIDE 20 MG: 20 TABLET, FILM COATED, EXTENDED RELEASE ORAL at 05:50

## 2019-07-12 RX ADMIN — HEPARIN SODIUM 5000 UNITS: 5000 INJECTION, SOLUTION INTRAVENOUS; SUBCUTANEOUS at 05:52

## 2019-07-12 RX ADMIN — RIFAMPIN 300 MG: 300 CAPSULE ORAL at 18:55

## 2019-07-12 RX ADMIN — AMLODIPINE BESYLATE 10 MG: 5 TABLET ORAL at 05:51

## 2019-07-12 RX ADMIN — GABAPENTIN 300 MG: 300 CAPSULE ORAL at 05:51

## 2019-07-12 RX ADMIN — ETHAMBUTOL HYDROCHLORIDE 800 MG: 400 TABLET, FILM COATED ORAL at 05:50

## 2019-07-12 RX ADMIN — DOCUSATE SODIUM 100 MG: 100 CAPSULE, LIQUID FILLED ORAL at 05:51

## 2019-07-12 RX ADMIN — LIDOCAINE 2 PATCH: 50 PATCH CUTANEOUS at 12:10

## 2019-07-12 ASSESSMENT — ENCOUNTER SYMPTOMS
FEVER: 0
DIARRHEA: 0
NAUSEA: 0
VOMITING: 0
CHILLS: 0
ABDOMINAL PAIN: 0

## 2019-07-12 ASSESSMENT — COGNITIVE AND FUNCTIONAL STATUS - GENERAL
WALKING IN HOSPITAL ROOM: A LITTLE
CLIMB 3 TO 5 STEPS WITH RAILING: A LOT
STANDING UP FROM CHAIR USING ARMS: A LITTLE
SUGGESTED CMS G CODE MODIFIER MOBILITY: CL
MOVING FROM LYING ON BACK TO SITTING ON SIDE OF FLAT BED: UNABLE
MOVING TO AND FROM BED TO CHAIR: UNABLE
MOBILITY SCORE: 11
TURNING FROM BACK TO SIDE WHILE IN FLAT BAD: UNABLE

## 2019-07-12 ASSESSMENT — GAIT ASSESSMENTS: GAIT LEVEL OF ASSIST: UNABLE TO PARTICIPATE

## 2019-07-12 NOTE — PROGRESS NOTES
Patient alert and oriented to person/place. Disoriented to time/ situation, delusional at times.   MAETC, restless legs  Anxious, moaning, crying intermittently    Pain in hips/ legs, PO Oxycontin scheduled and oxycodone PRN, IV morphine PRN     Tachycardic, Sys 's-160's, hydralazine and labetolol PRN     RA, clear lungs, labored breathing when agitated    NPO for procedure    20 R AC, NS @ 50    Return from IR @ 1815, fluid aspirated R gluteal, 4x4/ transparent dressing clean, dry, intact  VSS , patient drowsy RASS -2, 1800 meds held until patient wakes up

## 2019-07-12 NOTE — CARE PLAN
Problem: Mobility  Goal: Risk for activity intolerance will decrease  Outcome: PROGRESSING SLOWER THAN EXPECTED  Patient cries out anytime she gets OOB, feels like she is going to fall, reassurance provided.

## 2019-07-12 NOTE — PROGRESS NOTES
Bladder scan vol 550ml, MD notified. Order received for straight cath, if retaining okay to place payne

## 2019-07-12 NOTE — PROGRESS NOTES
Infectious Disease Progress Note    Author: Geno Junior M.D. Date & Time of service: 7/12/2019  7:45 AM    Chief Complaint:  Brain abscess, gluteal abscess  Vertebral OM     Interval History:  68 y.o. female admitted 5/29/2019 as a transfer from [facility] since 4/30/2019. + diabetes, SANTOSH of right hip with hardware, and breast cancer who was originally admitted to [facility] on 03/08/2019 for right hip and pelvic pain.  Work-up revealed a right iliopsoas lesion, gluteal abscess, and mult brain abscesses     6/24/2019 no fevers.  The hip pain is better.  No cough no shortness of breath  6/25/2019-no fevers.  The hip cultures remain negative.  She is scheduled for stereotactic biopsy on Friday.  6/26/2019 no fevers are noted.  Patient complains of back pain.  In significant distress.  6/27/2019-no fevers.  Continues to complain of pain.  No new issues overnight  6/28 AF WBC 6.6 planned for MRIs and biopsy today-somnolent  6/29 AF WBC 7.7 obtunded prelim path showed fungus on brain specimen  6/30 AF somnolent-not awakening to voice-slurred speech noted by Neurosurgery  7/1 AF more awake today; not oriented-ate about half of lunch c/o pain in joints  7/2 AF WBC 5.5 no new complaints  7/3 AF still c/o unrelenting right hip pain whenever awake-Falls asleep during exam  7/4 AF much more alert and conversant today-c/o left hip pain, unrelenting and would like parietal dressing changed. HA at surgical site.  Denies SE abx. No new neurodeficits  7/5 afebrile WBC 5.4.  Patient sleeping but arousable.  She denies any headache, nausea, vomiting.  7/6 afebrile WBC 6.5.  Patient sitting up in chair and having excruciating back pain  7/8 Pt has no specific complaints, she is mildly confused today.  She seems to be tolerating her medications with no significant lab abnormalities.  7/9 AF, O2 RA, Significant left hip pain today.  She does appear to be tolerating her antibiotics.  Ultrasound of bilateral lower extremities has  been ordered.  7/10 AF, O2 RA,  Plan for IR drainage of sacral collection soon.    AF, O2 RA,  Pt continued on RIPE and ampho.  She is tolerating well overall, requiring some mag and potassium replacement.  She is complaining of severe pain in left hip again today.  Plan for biopsy later today.     Review of Systems:  Review of Systems   Constitutional: Negative for chills and fever.   Gastrointestinal: Negative for abdominal pain, diarrhea, nausea and vomiting.   Musculoskeletal: Positive for joint pain.       Hemodynamics:  Temp (24hrs), Av.5 °C (97.7 °F), Min:36 °C (96.8 °F), Max:37.1 °C (98.7 °F)  Temperature: 36.4 °C (97.5 °F)  Pulse  Av.7  Min: 49  Max: 195   Blood Pressure : 136/69       Physical Exam:  Physical Exam   Constitutional: She appears well-developed and well-nourished.   HENT:   Head: Normocephalic.   Right craniotomy site with no sign of infection   Eyes: Pupils are equal, round, and reactive to light. Conjunctivae and EOM are normal.   Cardiovascular: Normal rate.    Tachycardic   Pulmonary/Chest: Effort normal and breath sounds normal.   Abdominal: Soft. Bowel sounds are normal. She exhibits no distension. There is no tenderness. There is no guarding.   Musculoskeletal:   Bandaging on hips   Neurological:   Somnolent and not responding to questions.  Per nursing she was oriented x4 earlier today.   Skin: Skin is warm and dry.       Meds:    Current Facility-Administered Medications:   •  magnesium sulfate  •  gabapentin  •  pramipexole  •  ferrous sulfate  •  diphenhydrAMINE  •  morphine injection  •  heparin  •  oxyCODONE CR  •  potassium chloride SA  •  magnesium chloride  •  pyridoxine  •  levETIRAcetam  •  isoniazid  •  riFAMPin  •  ethambutol  •  pyrazinamide  •  acetaminophen  •  Pharmacy Consult Request  •  labetalol  •  hydrALAZINE  •  docusate sodium  •  senna-docusate  •  Pharmacy  •  NS **AND** acetaminophen **AND** diphenhydrAMINE **AND** D5W **AND** amphotericin b  liposomal (AMBISOME) IV **AND** D5W **AND** NS  •  NS  •  lactobacillus rhamnosus  •  oxyCODONE immediate-release  •  sucralfate  •  acetaminophen  •  lidocaine  •  temazepam  •  cyclobenzaprine  •  Respiratory Care per Protocol  •  amLODIPine  •  cinacalcet  •  losartan  •  atorvastatin  •  omeprazole  •  metoprolol  •  ondansetron  •  ondansetron    Labs:  Recent Labs      07/10/19   0244  07/11/19   0311  07/12/19   0353   WBC  9.3  7.7  7.7   RBC  3.97*  3.37*  3.06*   HEMOGLOBIN  11.6*  9.7*  9.0*   HEMATOCRIT  35.0*  30.5*  28.1*   MCV  88.2  90.5  91.8   MCH  29.2  28.8  29.4   RDW  52.1*  54.4*  56.4*   PLATELETCT  283  240  207   MPV  8.9*  8.8*  8.8*     Recent Labs      07/10/19   0244  07/12/19   0353   SODIUM  139  138   POTASSIUM  3.5*  4.0   CHLORIDE  105  110   CO2  21  22   GLUCOSE  100*  82   BUN  7*  8     Recent Labs      07/10/19   0244  07/12/19   0353   ALBUMIN  3.9   --    TBILIRUBIN  0.6   --    ALKPHOSPHAT  360*   --    TOTPROTEIN  7.6   --    ALTSGPT  13   --    ASTSGOT  28   --    CREATININE  0.49*  0.59       Imaging:  Ct-needle Bx-deep Bone    Result Date: 6/20/2019 6/19/2019 9:12 AM HISTORY/REASON FOR EXAM:  Concern for right hip osteomyelitis. TECHNIQUE/EXAM DESCRIPTION: Right hip deep bone biopsy with CT guidance. Low dose optimization technique was utilized for this CT exam including automated exposure control and adjustment of the mA and/or kV according to patient size. PROCEDURE: Informed consent was obtained. Conscious sedation with 25 mcg Fentanyl and 1 mg Versed was administered during the procedure with appropriate continuous patient monitoring by the radiology nurse. Sedation duration: 30 minutes. Localizing CT images were obtained with the patient in prone position. The skin was prepped with ChloraPrep and draped in a sterile fashion. Following local anesthesia with 1% Lidocaine, a 13 G guiding needle was placed and needle position confirmed with CT. Using coaxial  technique, an 14 G core biopsy needle was placed into the target lesion in the right posterior ilium and needle position confirmed with CT. A total of 2 samples were obtained in this fashion and submitted in saline for microbiology. The guiding needle was removed and limited CT images obtained through the biopsy site show no evidence of significant parenchymal hemorrhage. The patient tolerated the procedure well. COMPARISON: None available. COMPLICATIONS: No immediate complications. FINDINGS: The localizing CT images show satisfactory needle position within the target location.     CT GUIDED RIGHT ILIUM DEEP BONE BIOPSY. SAMPLES WERE SENT TO MICROBIOLOGY FOR ANALYSIS.    Ct-pelvis With    Result Date: 6/17/2019 6/17/2019 7:23 AM HISTORY/REASON FOR EXAM:  Pelvic abscess TECHNIQUE/EXAM DESCRIPTION:  CT pelvis without IV contrast.   Sequential axial CT images were obtained from the lower lumbar spine to the proximal femurs following the administration of 100 cc Omnipaque 350 intravenous contrast. Low dose optimization technique was utilized for this CT exam including automated exposure control and adjustment of the mA and/or kV according to patient size. COMPARISON:  None FINDINGS: The visualized portions of the small bowel and colon appear within normal limits. The bladder is within normal limits. Inferior RIGHT iliacus fluid collection now measures 1.8 x 2.1 cm, previously 1.8 x 2.5 cm. More cephalad RIGHT iliacus fluid collection now measures 1.2 x 2.1 cm, previously 2 x 1.5 cm. Unhealed fractures with irregular margins or reflux demonstrated in the RIGHT iliac bone and the sacrum with a screw track extending into the LEFT iliac bone. There are areas of lucency irregularity in the LEFT iliac bone. There is anterolisthesis of L5 on S1 with irregular lucencies adjacent to the narrowed disc space. There is a 2 x 1.2 cm hyperdense area in the RIGHT gluteal musculature on axial image 14 of series 2. There is overlying  soft tissue edema. There are mildly prominent BILATERAL inguinal lymph nodes.     1.  No significant interval change in the size of the RIGHT iliac muscle fluid collections 2.  Hyperdense RIGHT gluteal lesion moderately suspicious for pseudoaneurysm with adjacent hematoma. 3.  Changes in the RIGHT iliac bone, BILATERAL sacrum and LEFT iliac bone suspicious for osteomyelitis 4.  Changes at L5-S1 suspicious for discitis osteomyelitis    Rv-hayxzcy-4 View    Result Date: 7/12/2019 7/12/2019 12:49 AM HISTORY/REASON FOR EXAM: Abdominal Pain TECHNIQUE/EXAM DESCRIPTION:  Single AP view the abdomen. COMPARISON:  None FINDINGS: Limited views of the lung bases are clear. Moderate stool is seen throughout the colon, otherwise bowel gas pattern is nonspecific. Levoscoliosis and degenerative changes of the lumbar spine are seen. Atherosclerotic calcifications are noted.     1.  Moderate stool in the colon suggests changes of constipation, otherwise nonspecific bowel gas pattern 2.  Atherosclerosis    Ir-us Guided Piv    Result Date: 7/8/2019  EXAMINATION:                                                                    HISTORY/REASON FOR EXAM:  Ultrasound Guided PIV.  TECHNIQUE/EXAM DESCRIPTION AND NUMBER OF VIEWS:  Peripheral IV insertion with ultrasound guidance.  The procedure was prepared using maximal sterile barrier technique including sterile gown, mask, cap, and donning of sterile gloves following appropriate hand hygiene and/or sterile scrub. Patient skin site was prepped with 2% Chlorhexidine solution.   FINDINGS: Peripheral IV insertion with Ultrasound Guidance was performed by qualified imaging nursing staff without the assistance of a Radiologist.      Ultrasound-guided PERIPHERAL IV INSERTION performed by qualified nursing staff as above.     Mr-brain-with & W/o    Result Date: 6/22/2019 6/22/2019 10:48 AM HISTORY/REASON FOR EXAM:  Headache, new, meningitis or encephalitis suspected. TECHNIQUE/EXAM  DESCRIPTION: MRI of the brain without and with contrast, with diffusion. The study was performed on a [employer] Signa 1.5 Miranda MRI scanner. Spoiled-GRASS sagittal, thin-section T2 axial, T1 coronal, T1 postcontrast coronal, T1 postcontrast axial, FLAIR coronal and diffusion-weighted axial images were obtained of the whole brain. 6 mL Gadavist contrast were administered intravenously. COMPARISON:  6/8/2019. FINDINGS: There is a pattern of mild cerebral atrophy manifest as prominence of sulcal markings over the convexities and vertex along with mild ventriculomegaly. The bony calvaria are intact. There is no evidence of extra-axial fluid collection or intracranial mass effect. Innumerable supra and infratentorial enhancing nodules are again noted. A lesion in the right thalamus appears somewhat more prominent when compared to the previous study measuring approximately 8 mm, previously approximately 5 mm. A lesion in the right frontal operculum gray-white junction demonstrates increased enhancement but overall unchanged size. A lesion in the right posterior hippocampus appears more somewhat larger and with increased enhancement. Enhancement. Appear to demonstrate associated diffusion restriction. There is an underlying pattern of mild supratentorial white matter disease with scattered foci of bright T2 and FLAIR signal in the subcortical and deep white matter of both hemispheres consistent with small vessel ischemic change versus demyelination or gliosis. There are normal flow voids in the cavernous carotid arteries and M1 segments. There are normal flow voids in the distal vertebral basilar system. There are normal flow voids in the dural venous sinuses. The visualized paranasal sinuses and mastoid air cells appear clear.     1.  Innumerable supra and infratentorial enhancing nodules are again noted throughout the brain parenchyma with multiple lesions appearing somewhat larger and with increased enhancement. No  associated diffusion restriction. Unchanged differential considerations including bacterial or fungal infection versus metastatic disease. 2.  Mild diffuse cerebral substance loss. 3.  Mild microangiopathic ischemic change versus demyelination or gliosis.    Mr-lumbar Spine-with & W/o    Result Date: 6/28/2019 6/28/2019 9:56 AM HISTORY/REASON FOR EXAM:  Back pain or radiculopathy, cancer or infection suspected. TECHNIQUE/EXAM DESCRIPTION: MRI of the lumbar spine without and with contrast. The study was performed on a [employer] 1.5 Miranda MRI scanner. T1 sagittal, T2 fast spin-echo sagittal, and T2 axial images were obtained of the lumbar spine. T1 post-contrast fat-suppressed sagittal images were obtained. Optional T2 fat-suppressed sagittal and T1 post-contrast axial images may be obtained. 6 mL Gadavist contrast was administered intravenously. COMPARISON:  MRI scan of the lumbar spine 3/26/2019 FINDINGS: There is a grade 2-3 spondylolisthesis at the L5-S1 level with bilateral spondylolysis of pars interarticularis. There is been interval removal of the posterior fixation hardware since previous exam. There is diffuse decreased T1 signal intensity throughout the majority of the L5 vertebral body and the first and second sacral segments. Further there is heterogeneous increased T2 signal intensity and enhancement in these regions. There is also evidence for enhancing soft tissue anterior to the L5 vertebral body and first through third sacral segments. There are elliptical nonenhancing areas in this region of abnormal enhancing soft tissue anterior to the L5-S1 disc space and first sacral segment. Additionally there are ovoid nonenhancing areas in  the first and third sacral segments. There is a 4 x 2.1 cm somewhat ovoid fluid signal intensity collection lateral to the right side of the iliac bone involving the right gluteal musculature. Additionally there is a screw tract coursing transversely through the iliac  bones and sacrum which  has heterogeneous enhancement. There is enhancing soft tissue signal intensity extending from the screw tract in the right ilium into the right-sided gluteal soft tissues. There is also an ovoid area of decreased T1 and increased T2 signal intensity in the right posterior ilium. There is a 3.3 cm multiloculated fluid signal intensity collection involving the right sacroiliac joint. The conus is normal in position and signal. There is no abnormal cord enhancement. There is mild to moderate disc space narrowing at the L5-S1 level which has evidence of increased T2 signal intensity and enhancement. At T12-L1 through L4-5 disc height and signal is normal. Level specific findings: L5-S1 level minimal annular bulging. Severe bilateral neural foraminal stenosis secondary to the spondylolisthesis. L4-5 level minimal posterior spurring and annular bulging. L3-4 level minimal annular bulging. L2-3 level minimal annular bulging. L1-2 level minimal annular bulging.     1.  Grade 2-3 spondylolisthesis at the L5-S1 level with bilateral spondylolysis of the pars interarticularis. This causes severe bilateral neural foraminal stenosis at this level. 2.  Interval removal of posterior fusion hardware at the lumbosacral junction. 3.  Diffuse osteomyelitis involving the L5 vertebral body and the first 3 sacral segments. 4.  Chronic discitis at the L5-S1 level with anterior paraspinous abscess at this level and anterior to the S1 sacral segment. 5.  Smaller intraosseous bone bone abscesses or areas of necrosis noted in the sacrum. 6.  There is also evidence of osteomyelitis involving the jose antonio and sacrum bilaterally along the course of the previous sacral fixation screw. There is a large 4 cm abscess noted in the right gluteal soft tissues in a smaller 3 cm chronic appearing abscess in the left gluteal musculature. 7.  There is a 1.5 cm intraosseous abscess or area of necrosis in the right posterior ilium. 8.   "There is a 3.3 cm centimeters multiloculated abscess anterior to the right sacroiliac joint.    Us-extremity Venous Lower Bilat    Result Date: 2019   Vascular Laboratory  CONCLUSIONS  Normal bilateral superficial and deep venous examination of the lower  extremities.  MARLENA CLIFFORD  Exam Date:     2019 11:10  Room #:     Inpatient  Priority:     Routine  Ht (in):             Wt (lb):  Ordering Physician:        APRIL MERA  Referring Physician:       SAMARIA Hanks  Sonographer:               Sumi Sales RVT  Study Type:                Complete Bilateral  Technical Quality:         Adequate  Age:    68    Gender:     F  MRN:    [id number]  :    1950      BSA:  Indications:     Personal history of venous thrombosis and embolism  CPT Codes:       74639  ICD Codes:       Z86.71  History:         Per physician order \"history of reported DVT\", patient is                   poor historian. No previous duplex on file. DVT.  Limitations:  PROCEDURES:  Bilateral lower extremity venous duplex imaging.  The following venous structures were evaluated: common femoral, profunda  femoral, greater saphenous, femoral, popliteal , peroneal and posterior  tibial veins.  Serial compression, augmentation maneuvers,  color and spectral Doppler  flow evaluations were performed.  FINDINGS:  Bilateral lower extremities -  Complete color filling and compressibility with normal venous flow dynamics  including spontaneous flow, response to augmentation maneuvers, and  respiratory phasicity.  The peroneal and posterior tibial veins are difficult to assess for  compressibility, but flow response to augmentation is demonstrated.  Donovan Finley  (Electronically Signed)  Final Date:      2019                   12:51    Us-extremity Non Vascular Unilateral Right    Result Date: 2019 2:45 PM HISTORY/REASON FOR EXAM:  hx of right hip hardware removal 6/15; swelling Evaluate right hip for fluid " collection. TECHNIQUE/EXAM DESCRIPTION AND NUMBER OF VIEWS: Limited ultrasound of the right hip COMPARISON: 6/17/2019 FINDINGS: No right hip joint effusion. No soft tissue fluid collection.     No right hip joint effusion. No soft tissue fluid collection.    Mr-stealth Brain With & W/o    Result Date: 6/28/2019 6/28/2019 9:57 AM HISTORY/REASON FOR EXAM:  Brain metastasis follow-up TECHNIQUE/EXAM DESCRIPTION AND NUMBER OF VIEWS: MRI of the brain without and with contrast, Stealth protocol. Fiducial markers for the Stealth stereotactic system were placed on the scalp. Thin-section 2 mm T2 fast spin-echo true axial images were obtained of the whole brain. Thin-section 1.5 mm T1-weighted postcontrast axial 3D BRAVO images were obtained of the whole brain. 3D reconstructions in the Coronal and Sagittal planes were generated from the axial 3D BRAVO postcontrast sequence. The study was performed on a [employer] Signa 1.5 Miranda MRI scanner. 6 mL Gadavist contrast was administered intravenously. COMPARISON:  MRI scans brain 3/7/2019 FINDINGS: There are innumerable varying size nodular enhancing lesions throughout the brain parenchyma. Many of these lesions are at the gray-white junction but there are multiple lesions arising in the region of the basal ganglia. The largest lesion is in the right thalamus and measures 9 mm in greatest diameter. There is a moderate amount of surrounding increased T2 signal intensity in the right thalamus. This lesion has increased in size since previous exam. The majority of the lesions have not changed significantly since previous exam. The calvariae are unremarkable. There are no extra-axial fluid collections. The ventricular system and basal cisterns are mild to moderately prominent. There is mild-to-moderate prominence of cortical sulci and gyri. There are no mass effects or shift of  midline structures. There are no hemorrhagic lesions. Vascular flow voids in the vertebrobasilar and carotid  arteries, [place], and dural venous sinuses are intact. The paranasal sinuses and mastoids in the field of view are unremarkable.     1.  Innumerable subcentimeter brain metastases, many at the gray-white junction but also multiple noted throughout the basal ganglia and brainstem. 2.  Largest index lesion is noted in the right thalamus and has increased in size since previous exam and currently measures 9 mm and has a moderate amount of surrounding vasogenic edema.      Micro:  Results     Procedure Component Value Units Date/Time    CULTURE TISSUE W/ GRM STAIN [[id number]] Collected:  07/11/19 1745    Order Status:  Completed Specimen:  Other Updated:  07/12/19 0638    Anaerobic Culture [[id number]] Collected:  07/11/19 1745    Order Status:  Completed Specimen:  Other Updated:  07/12/19 0638    Fungal Culture [[id number]] Collected:  07/11/19 1745    Order Status:  Completed Specimen:  Other Updated:  07/11/19 1929    AFB Culture [[id number]] Collected:  07/11/19 1745    Order Status:  Completed Specimen:  Other Updated:  07/11/19 1929    AFB Culture [[id number]]     Order Status:  No result Specimen:  Tissue from Right Hip     Fungal Culture [[id number]]     Order Status:  No result Specimen:  Tissue from Right Hip           Assessment:  Active Hospital Problems    Diagnosis   • *Brain lesion [G93.9]   • Granulomatous disease  [L92.9]   • Abscess of right iliac muscle and right gluteus medius muscle [L02.91]   • Pseudoaneurysm following procedure ([facility]) [I97.89, I72.9]   • Sepsis ([facility]) [A41.9]   • Hypomagnesemia [E83.42]   • Hypercalcemia [E83.52]   • RLS (restless legs syndrome) [G25.81]   • Acquired circulating anticoagulants ([facility]) [D68.318]   • Non-severe protein-calorie malnutrition ([facility]) [E46]   • History of DVT (deep vein thrombosis) [Z86.718]   • Essential hypertension [I10]   • Chronic hyponatremia [E87.1]   • History of breast cancer [Z85.3]   • COPD (chronic obstructive pulmonary disease) ([facility])  "[J44.9]   • Chronic pain [G89.29]     Interval 24 hour assessment:    Events, IR biopsy of R gluteal abscess   AF, O2 2 L NC,    Labs reviewed  Studies reviewed  Micro reviewed    Pt continued on RIPE and ampho.  Tolerating antibiotics so far.  She is quite somnolent today but per nursing she was oriented x4 earlier.    Assessment/Plan:     Encephalopathy, somnolent today    Brain abscesses   Query CNS fungal infection vs mycobacterial or other   MRI 4/23 \"supra and infratentorial brain parenchyma. Decrease in the size of the lesions. Interval reduction in the extent of the surrounding white matter edema consistent with improvement/treatment response of the most of the multifocal brain abscesses.  MRI 5/21 showed innumerable microabscesses vs mets  Abx (vancomycin, cefepime and Flagyl) discontinued on 5/23 after ~8 weeks of treatment-developed new fevers on 6/4  MRI on 6/8 with interval progression of supra and infratentorial intra--axial nodules and associated edema.  New right thalamus lesion. Radiology favors infection over neoplasm though both remain considerations (had been off abx)  Mult blood cultures neg  CHAS neg 3/15 and 5/24  MRI 6/22 worsening CNS lesions  S/p right craniotomy and resection of mass with biopsy on 6/28. Biopsy-per note fungal appearing elements seen per Neurosurgery-discussed with pathologist who examined the frozen section and there were no fungal appearing elements.   +necrotizing granulomas. All stains negative in EPIC  Fungal culture- negative to date  Bacterial cultures-negative to date  AFB culture-in progress  Pathology- focal necrotizing granulomatous inflammation; no fungal elements or acid-fast bacilli on stains.  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  Still with specimen in paraffin block, formalin was used   --- Follow-up brain biopsy specimen sent to [facility] for PCR testing, bacterial, fungal, AFB   --- Follow-up serologic testing sent out to [facility]- Brucella culture and ab, Coxiella ab, Histo ab serum & antigen urine, Blasto antigen urine    --- Discussed MRI findings with radiologist and there is easily obtained tissue/fluid in the sacrum and area that has been worsening despite therapy, per radiology images most consistent to TB or Brucellosis  -Pelvic biopsyon 7/11 - fluid drainage and specimen sent to [facility] again for AFB, bacterial and fungal PCR- have alerted micro and send out lab -confirmed with Alex this was sent out on 7/12   --- Also send biopsy specimens to [facility] for review for malignant cells, stains for fungal and AFB    --- Send biopsy specimen to [facility] micro for bacterial gram stain and culture, fungal smear and culture, AFB stain and culture- will instruct micro to hold for 14 days      --- Repeat MRI brain  W & W/O in next 1-2 days - as she has been on new therapy for ~ 2 weeks to see if stable or improved- if worse will stop ampho and re-evaluate RIPE depending on new study results         Gluteal and iliopsoas abscess   OM L5, S1-3, new this admission  Recurrent abscesses, additional work  Adjacent to hardware in right hip (placed 12/17/18)  CT 4/23 \"Fluid collections within the right gluteal and iliacis muscles.. The collection within the " "right gluteal muscle has unchanged while the fluid collection within the right iliacis muscle is increased somewhat in size.\" 2.7 cm  Cultures 3/29 and 4/4 neg  MRI on 5/20/2019 - interval decrease in collection in R gluteal muscle and persistent multiloculated collection in R iliacus muscle.   CT 5/28 no change  s/p drainage on 5/30/2019-cultures negative  S/p removal hardware 6/5-no cultures done  1, 3 beta glucan neg  CT on 6/8 with residual abscess in the right iliacus muscle, 2.5 x 1.8 cm, slightly smaller than prior  s/p CT-guided deep bone biopsy 6/19/2019.  Cultures remain negative; path not reported   MRI 6/28 chronic discitis, diffuse OM L5, 3 and 4 cm abscesses right glute, 1.5 cm abscess or necrosis right posterior ileum, 3.3 cm abscess right SI joint     --- Ampho B as above and TB therapy    Right gluteal pseudoaneurysm, significant interval enlargement noted by IR during procedure on 7/11  -CTA planned      +QuantiGold  Query disseminated TB  Path with necrotizing granulomas  AFB stain neg  AFB cultures pending  All prior AFB cxs still neg  Started RIPE +B6 7/3     --- Repeat MRI will be needed  --- Monitor for visual changes while on ethambutol- please ask if ophthalmology can get baseline vision as well as color vision testing done while inpatient     Type 2 DM  Hemoglobin A1c 6.3   Keep BS under 150 to help control current infection    Discussed with Dr. Alatorre, nurse and micro tech.  "

## 2019-07-12 NOTE — PROGRESS NOTES
Patient brought CT4 for procedure.  Patient oriented to person and place.  Consent for gluteal collection aspiration obtained via phone with doctor and nurse with patient's brother, who is POA.  Patient assisted onto procedure table and placed in prone position.  Oxygen and monitors applied.  Pt prepped and draped in sterile fashion.  Time out performed.  All team members in agreement.  R gluteal fluid collection/phlegmon aspiration/biopsy completed.  3 cores placed in formalin and 3 cores placed in saline.  Specimens taken to pathology and micro labs by PATRICK Ramírez RN.  4x4 and tegaderm applied to puncture site R gluteus.  Report called to PATRICK Orozco.  Patient transported back to room by 2 RNs.  SpO2 98% with O2 at 2L/NC.

## 2019-07-12 NOTE — PROGRESS NOTES
2 RN skin check complete with PATRICK Duran.   Devices in place- PIV.   Skin assessed under devices - yes.   No new potential pressure ulcers noted.   Old surgical incision to right side of head has staples SHAZIA, CDI.    Elbows are red and blanching.  Bilat heels and feet are dry, flaky, and boggy.  Bilat knees reddened but blanching.  The following interventions in place - pressure redistribution mattress, barrier cream, mepliex to bilat heels.

## 2019-07-12 NOTE — PROGRESS NOTES
Paged  regarding firm abdomen and constant pain. Patient stats left leg and belly hurts 10 out of 10, she is moaning and thrashing around bed. KUB ordered, waiting results and additional orders.

## 2019-07-12 NOTE — THERAPY
"Physical Therapy Treatment completed.   Bed Mobility:  Supine to Sit: Minimal Assist  Transfers: Sit to Stand: Minimal Assist  Gait: Level Of Assist: Unable to Participate   Plan of Care: Will benefit from Physical Therapy 3 times per week  Discharge Recommendations: Equipment: Will Continue to Assess for Equipment Needs. Post-acute therapy Recommend post-acute placement for continued physical therapy services prior to discharge home. Patient can tolerate post-acute therapies at a 5x/week frequency.       See \"Rehab Therapy-Acute\" Patient Summary Report for complete documentation.     Pt was pleasant and agreeable with encouragement. she required [place] for trunk to reach EOB and able to scoot forward. Pt requesting use of restroom therefore performed stand step pivot over to [facility] with modA. Once on commode pt moaning and crying out in pain as she was leaning all the way onto right hip. educated and assisted pt leaning onto left to help pain but pt unable to follow that command. Performed stand step pivot with vc and modA back to bed. Pt deferring further gait training due to pain and wanting to eat lunch. At current level pt [facility]. Continue to follow while in house.   "

## 2019-07-12 NOTE — OR SURGEON
Immediate Post- Operative Note        PostOp Diagnosis: Pelvic OM.       Procedure(s): CT guided aspiration/biopsy of a R gluteal fluid collection/phlegmon.       Estimated Blood Loss: Less than 5 ml        Complications: None            7/11/2019     1746 PM     Willy Cadena

## 2019-07-12 NOTE — CARE PLAN
Problem: Communication  Goal: The ability to communicate needs accurately and effectively will improve  Outcome: PROGRESSING SLOWER THAN EXPECTED   used when needed. Patient speaks both English and Arabic, however there are times when neither language is understandable.     Problem: Safety  Goal: Will remain free from injury  Outcome: PROGRESSING AS EXPECTED  Bed locked in lowest position, call light within reach, hourly rounding , and bed alarm set.   Goal: Will remain free from falls  Outcome: PROGRESSING AS EXPECTED

## 2019-07-12 NOTE — PROGRESS NOTES
Hospital Medicine Daily Progress Note    Date of Service  7/12/2019    Chief Complaint    68 y.o. female admitted 5/29/2019 with pelvic pain and confusion .     Hospital Course    68 y.o. female transferred from [facility] on 5/29/2019 with persistent right gluteal abscesses since sacral fracture repair in December with multiple IR drainage.     Interval imaging to date:  MRI brain showed increase in brain lesions  CHAS done by Dr. Potter showed no vegetations.  MRI brain on 6/7/19 saw progression of the supra and infratentorial intra-axial nodules with edema, prompting a Neurosurgery consult.     Repeat CT on 6/17 showed no significant interval change in the size of the right iliacus muscle fluid collections, with changes in the right iliac bone, bilateral sacrum, and left iliac bone suspicious for osteomyelitis, and changes at L5-S1 suspicious for discitis/osteomyelitis, along with hyperdense right gluteal lesion, moderately suspicious for postoperative pseudoaneurysm with adjacent hematoma.      Cultures:  Pelvic abscess drainage on 5/30 with negative results.  Biopsy of the hip showed no AFB, organism, or fungal elements    Patient had re evaluation by Dr. Nguyen and she underwent right-sided craniotomy with resection of right sided superficial mass on 6/28/2019.  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  Patient started on IV Amphotericin.  Path +focal necrotizing granulomatous inflammation with a marked histiocytic infiltrate.    Interval Problem Update  Afebrile, but in more pain today it seemed like. LE restlessness, on pramipexole but not effective?  Persistent hypomagnesemia on IV amphotericin, but electrolytes improving. Had IR guided bx of abscesses with extensive lab workup planned, all those cultures pending at this time. Previously had negative cultures but +ve Quant so on RIPE.     Increased gabapentin with crosstitration of pramipexole.     Message sent to Dr Haas (opho) regarding vision  testing request while inpatient and on ethambutol.     Neutrophil function NORMAL, r/o chronic granulomatous dz.     Consultants/Specialty  Neurosurgery, Dr. Nguyen  Pulmonary Dr. Salinas  Infectious disease  IR    Code Status  DNR/DNI    Disposition  Unclear  High complexity  Remains inpatient  Anticipate SNF when more stable    Review of Systems  Review of Systems   Unable to perform ROS: Mental acuity     Physical Exam  Temp:  [36.3 °C (97.4 °F)-37.1 °C (98.7 °F)] 36.3 °C (97.4 °F)  Pulse:  [] 81  Resp:  [16-20] 16  BP: ()/(57-99) 142/79  SpO2:  [94 %-99 %] 98 %    Physical Exam   Constitutional: She is oriented to person, place, and time. She appears distressed.   Gesturing to LEs, uncomfortable appearing  Difficult to understand   HENT:   Head: Normocephalic and atraumatic.   Mouth/Throat: Oropharynx is clear and moist.   Right cranio site present with staples   Eyes: Conjunctivae and EOM are normal. Right eye exhibits no discharge. Left eye exhibits no discharge. No scleral icterus.   Neck: Normal range of motion. Neck supple.   Cardiovascular: Normal rate and intact distal pulses.    No murmur heard.  Pulmonary/Chest: No stridor. No respiratory distress. She has no wheezes. She has no rales.   Abdominal: Soft. She exhibits no distension. There is no tenderness.   Musculoskeletal: She exhibits no edema or tenderness.   Generalized 4/5 strength.  Right hip with palpable induration  Constant movement LEs at ankles and knees    Neurological: She is alert and oriented to person, place, and time. Coordination abnormal.   No focal weakness  Consistent RLS type movement LEs  No TTP   Skin: Skin is warm and dry. She is not diaphoretic. No pallor.   Brawny induration LEs   Psychiatric:   Anxious   Vitals reviewed.    Fluids    Intake/Output Summary (Last 24 hours) at 07/12/19 1440  Last data filed at 07/12/19 1030   Gross per 24 hour   Intake             1375 ml   Output              630 ml   Net               745 ml       Laboratory  Recent Labs      07/10/19   0244  07/11/19   0311  07/12/19   0353   WBC  9.3  7.7  7.7   RBC  3.97*  3.37*  3.06*   HEMOGLOBIN  11.6*  9.7*  9.0*   HEMATOCRIT  35.0*  30.5*  28.1*   MCV  88.2  90.5  91.8   MCH  29.2  28.8  29.4   MCHC  33.1*  31.8*  32.0*   RDW  52.1*  54.4*  56.4*   PLATELETCT  283  240  207   MPV  8.9*  8.8*  8.8*     Recent Labs      07/10/19   0244  07/12/19   0353   SODIUM  139  138   POTASSIUM  3.5*  4.0   CHLORIDE  105  110   CO2  21  22   GLUCOSE  100*  82   BUN  7*  8   CREATININE  0.49*  0.59   CALCIUM  9.5  9.6                 Assessment/Plan  * Brain lesion- (present on admission)   Assessment & Plan    With right gluteal, multiloculated right iliac muscle fluid collections.     CHAS neg 3/15 and 5/24.    Cultures 3/29 and 4/4 neg  She has completed multiple courses of IV antibiotics  s/p drainage on 5/30/2019, cultures negative  s/p removal hardware  1, 3 beta glucan neg.      CT on 6/8 with residual abscess in the right iliacus muscle, 2.5 x 1.8 cm, slightly smaller than prior  s/p CT-guided deep bone biopsy 6/19/2019.  Cultures remain negative; path not reported     MRI 6/28 chronic discitis, diffuse OM L5, 3 and 4 cm abscesses right glute, 1.5 cm abscess or necrosis right psoterior ileum, 3.3 cm abscess right SI joint.    Status post brain biopsy - 6-28-19   Q gold +   Brain biopsy + positive focal necrotizing granulomas-may suggest mycobacterial infection. ? Fungal infxn, other cause.    Continue IV amphotericin per ID.  Has had electrolyte wasting with recurrent hypomagnesemia.  Continue schedule KCl and magnesium supplements  Monitor electrolytes closely  Continue Keppra for sz prophylaxis.     Patient with recurrent abscesses with progression, osteomyelitis and granulomas-  cultures have been negative  NORMAL neutrophil function test.     Consideration of stoping Ampho soon  Talked with ID about MRI brain 1-2 days  Follow up cultures, potentially  something over weekend    Contacted Dr Haas for vision testing on ethambutol      Granulomatous disease    Assessment & Plan    Cultures negative with recurrent, progressive abscesses, OM.  Unclear cause.  Continue treatment for possible infectious causes as above.      NORMAL Neutrophil function test- dihydrorhodamine 123 screening test.      Abscess of right iliac muscle and right gluteus medius muscle- (present on admission)   Assessment & Plan    Recurrent issue since sacral fracture repair in December 2018. Has had multiple IR drainage and antibiotics. Recent cultures remain unrevealing.    s/p biopsy of the nonhealing right iliac fracture area. Showed no AFB, organism, or fungal elements.  Cultures negative.  Pain control.  Increased OxyContin, still uncontrolled and increased PRN morphine dose    Continue Lidoderm patch, PRN oxycodone, IV morphine for severe breakthrough pain  Continue with  RI PE w B6 and amphotericin per ID.       Sepsis (HCC)   Assessment & Plan    This is sepsis (without associated acute organ dysfunction).    Sepsis resolved  Continue amphotericin , RIPE started.  Monitor vital signs     Hypomagnesemia- (present on admission)   Assessment & Plan    Corrected  Scheduled magnesium  Monitor electrolytes       Hypercalcemia- (present on admission)   Assessment & Plan    Has resolved  Continue to monitor calcium     RLS (restless legs syndrome)- (present on admission)   Assessment & Plan    Appears to have augmented   Giving her severe distress, no sleep  Cross titrate with incr dose gabapentin, start lowering pramipexole dose     Acquired circulating anticoagulants (HCC)- (present on admission)   Assessment & Plan    Anticoagulation on hold given recent brain biopsy.  Discussed with Dr. Nguyen- Ok to start AC per surgery .  US legs negative for DVT  Start DVT pplx     Non-severe protein-calorie malnutrition (HCC)- (present on admission)   Assessment & Plan    Advance diet as  tolerated  Dietary supplements       History of DVT (deep vein thrombosis)- (present on admission)   Assessment & Plan    Hold anticoagulation given recent brain biopsy and lumbar osteomyelitis.  LE duplex negative     Essential hypertension- (present on admission)   Assessment & Plan    Continue metoprolol losartan and amlodipine  Monitor blood pressure     Chronic hyponatremia- (present on admission)   Assessment & Plan    Recurrent  Continue to monitor Na.     Chronic pain- (present on admission)   Assessment & Plan    Continue pain management       History of breast cancer- (present on admission)   Assessment & Plan    S/p left mastectomy and breast implant.  Follow-up on intraoperative final pathology results     COPD (chronic obstructive pulmonary disease) (HCC)- (present on admission)   Assessment & Plan    Stable   RT protocol and oxygen        Discussed with multi disciplinary team plan of care.     VTE prophylaxis: SCDs, start heparin sq for now, consider brooks

## 2019-07-13 LAB
ANION GAP SERPL CALC-SCNC: 10 MMOL/L (ref 0–11.9)
BUN SERPL-MCNC: 13 MG/DL (ref 8–22)
CALCIUM SERPL-MCNC: 10 MG/DL (ref 8.5–10.5)
CHLORIDE SERPL-SCNC: 111 MMOL/L (ref 96–112)
CO2 SERPL-SCNC: 16 MMOL/L (ref 20–33)
CREAT SERPL-MCNC: 0.71 MG/DL (ref 0.5–1.4)
ERYTHROCYTE [DISTWIDTH] IN BLOOD BY AUTOMATED COUNT: 64.1 FL (ref 35.9–50)
GLUCOSE SERPL-MCNC: 102 MG/DL (ref 65–99)
H CAPSUL AG UR QL IA: NOT DETECTED
H CAPSUL AG UR-MCNC: NOT DETECTED NG/ML
HCT VFR BLD AUTO: 35.4 % (ref 37–47)
HGB BLD-MCNC: 9.9 G/DL (ref 12–16)
MAGNESIUM SERPL-MCNC: 1.5 MG/DL (ref 1.5–2.5)
MCH RBC QN AUTO: 28.8 PG (ref 27–33)
MCHC RBC AUTO-ENTMCNC: 28 G/DL (ref 33.6–35)
MCV RBC AUTO: 102.9 FL (ref 81.4–97.8)
PLATELET # BLD AUTO: 203 K/UL (ref 164–446)
PMV BLD AUTO: 9.3 FL (ref 9–12.9)
POTASSIUM SERPL-SCNC: 4.5 MMOL/L (ref 3.6–5.5)
RBC # BLD AUTO: 3.44 M/UL (ref 4.2–5.4)
SODIUM SERPL-SCNC: 137 MMOL/L (ref 135–145)
WBC # BLD AUTO: 5.9 K/UL (ref 4.8–10.8)

## 2019-07-13 PROCEDURE — A9270 NON-COVERED ITEM OR SERVICE: HCPCS | Performed by: HOSPITALIST

## 2019-07-13 PROCEDURE — A9270 NON-COVERED ITEM OR SERVICE: HCPCS | Performed by: FAMILY MEDICINE

## 2019-07-13 PROCEDURE — 99233 SBSQ HOSP IP/OBS HIGH 50: CPT | Performed by: HOSPITALIST

## 2019-07-13 PROCEDURE — 36415 COLL VENOUS BLD VENIPUNCTURE: CPT

## 2019-07-13 PROCEDURE — 80048 BASIC METABOLIC PNL TOTAL CA: CPT

## 2019-07-13 PROCEDURE — 700102 HCHG RX REV CODE 250 W/ 637 OVERRIDE(OP): Performed by: FAMILY MEDICINE

## 2019-07-13 PROCEDURE — 99233 SBSQ HOSP IP/OBS HIGH 50: CPT | Performed by: INTERNAL MEDICINE

## 2019-07-13 PROCEDURE — 700102 HCHG RX REV CODE 250 W/ 637 OVERRIDE(OP): Performed by: HOSPITALIST

## 2019-07-13 PROCEDURE — 700105 HCHG RX REV CODE 258: Performed by: FAMILY MEDICINE

## 2019-07-13 PROCEDURE — 700111 HCHG RX REV CODE 636 W/ 250 OVERRIDE (IP): Mod: JG | Performed by: FAMILY MEDICINE

## 2019-07-13 PROCEDURE — 700102 HCHG RX REV CODE 250 W/ 637 OVERRIDE(OP): Performed by: INTERNAL MEDICINE

## 2019-07-13 PROCEDURE — 85027 COMPLETE CBC AUTOMATED: CPT

## 2019-07-13 PROCEDURE — 83735 ASSAY OF MAGNESIUM: CPT

## 2019-07-13 PROCEDURE — 770006 HCHG ROOM/CARE - MED/SURG/GYN SEMI*

## 2019-07-13 PROCEDURE — 700101 HCHG RX REV CODE 250: Performed by: FAMILY MEDICINE

## 2019-07-13 PROCEDURE — A9270 NON-COVERED ITEM OR SERVICE: HCPCS | Performed by: INTERNAL MEDICINE

## 2019-07-13 PROCEDURE — 700111 HCHG RX REV CODE 636 W/ 250 OVERRIDE (IP): Performed by: HOSPITALIST

## 2019-07-13 RX ADMIN — LEVETIRACETAM 500 MG: 500 TABLET, FILM COATED ORAL at 04:59

## 2019-07-13 RX ADMIN — AMLODIPINE BESYLATE 10 MG: 5 TABLET ORAL at 04:58

## 2019-07-13 RX ADMIN — POTASSIUM CHLORIDE 40 MEQ: 20 TABLET, EXTENDED RELEASE ORAL at 16:45

## 2019-07-13 RX ADMIN — PYRAZINAMIDE 1000 MG: 500 TABLET ORAL at 04:59

## 2019-07-13 RX ADMIN — ETHAMBUTOL HYDROCHLORIDE 800 MG: 400 TABLET, FILM COATED ORAL at 05:00

## 2019-07-13 RX ADMIN — HEPARIN SODIUM 5000 UNITS: 5000 INJECTION, SOLUTION INTRAVENOUS; SUBCUTANEOUS at 05:08

## 2019-07-13 RX ADMIN — DIPHENHYDRAMINE HYDROCHLORIDE, ZINC ACETATE: 2; .1 CREAM TOPICAL at 16:10

## 2019-07-13 RX ADMIN — CINACALCET HYDROCHLORIDE 60 MG: 30 TABLET, COATED ORAL at 04:58

## 2019-07-13 RX ADMIN — MAGNESIUM 64 MG (MAGNESIUM CHLORIDE) TABLET,DELAYED RELEASE 128 MG: at 04:56

## 2019-07-13 RX ADMIN — SENNOSIDES, DOCUSATE SODIUM 2 TABLET: 50; 8.6 TABLET, FILM COATED ORAL at 16:45

## 2019-07-13 RX ADMIN — MAGNESIUM 64 MG (MAGNESIUM CHLORIDE) TABLET,DELAYED RELEASE 128 MG: at 16:50

## 2019-07-13 RX ADMIN — PYRIDOXINE HCL TAB 50 MG 100 MG: 50 TAB at 05:01

## 2019-07-13 RX ADMIN — SODIUM CHLORIDE 250 ML: 9 INJECTION, SOLUTION INTRAVENOUS at 19:08

## 2019-07-13 RX ADMIN — PRAMIPEXOLE DIHYDROCHLORIDE 0.5 MG: 0.5 TABLET ORAL at 05:01

## 2019-07-13 RX ADMIN — OMEPRAZOLE 20 MG: 20 CAPSULE, DELAYED RELEASE ORAL at 04:59

## 2019-07-13 RX ADMIN — ACETAMINOPHEN 650 MG: 325 TABLET, FILM COATED ORAL at 15:01

## 2019-07-13 RX ADMIN — SUCRALFATE 1 G: 1 SUSPENSION ORAL at 16:45

## 2019-07-13 RX ADMIN — DEXTROSE MONOHYDRATE 30 ML: 50 INJECTION, SOLUTION INTRAVENOUS at 15:45

## 2019-07-13 RX ADMIN — ACETAMINOPHEN 1000 MG: 500 TABLET ORAL at 04:58

## 2019-07-13 RX ADMIN — OXYCODONE HYDROCHLORIDE 5 MG: 5 TABLET ORAL at 08:44

## 2019-07-13 RX ADMIN — LOSARTAN POTASSIUM 50 MG: 50 TABLET ORAL at 04:58

## 2019-07-13 RX ADMIN — OXYCODONE HYDROCHLORIDE 20 MG: 20 TABLET, FILM COATED, EXTENDED RELEASE ORAL at 16:44

## 2019-07-13 RX ADMIN — Medication 1 CAPSULE: at 08:44

## 2019-07-13 RX ADMIN — AMPHOTERICIN B 285.5 MG: 50 INJECTION, POWDER, LYOPHILIZED, FOR SOLUTION INTRAVENOUS at 16:46

## 2019-07-13 RX ADMIN — CYCLOBENZAPRINE 10 MG: 10 TABLET, FILM COATED ORAL at 03:13

## 2019-07-13 RX ADMIN — PRAMIPEXOLE DIHYDROCHLORIDE 0.5 MG: 0.5 TABLET ORAL at 16:50

## 2019-07-13 RX ADMIN — RIFAMPIN 300 MG: 300 CAPSULE ORAL at 05:01

## 2019-07-13 RX ADMIN — PRAMIPEXOLE DIHYDROCHLORIDE 0.5 MG: 0.5 TABLET ORAL at 11:42

## 2019-07-13 RX ADMIN — OXYCODONE HYDROCHLORIDE 5 MG: 5 TABLET ORAL at 03:13

## 2019-07-13 RX ADMIN — LIDOCAINE 2 PATCH: 50 PATCH CUTANEOUS at 11:42

## 2019-07-13 RX ADMIN — OXYCODONE HYDROCHLORIDE 10 MG: 5 TABLET ORAL at 20:28

## 2019-07-13 RX ADMIN — DIPHENHYDRAMINE HCL 25 MG: 25 TABLET ORAL at 15:00

## 2019-07-13 RX ADMIN — LEVETIRACETAM 500 MG: 500 TABLET, FILM COATED ORAL at 16:45

## 2019-07-13 RX ADMIN — SUCRALFATE 1 G: 1 SUSPENSION ORAL at 05:25

## 2019-07-13 RX ADMIN — ATORVASTATIN CALCIUM 10 MG: 10 TABLET, FILM COATED ORAL at 16:51

## 2019-07-13 RX ADMIN — HEPARIN SODIUM 5000 UNITS: 5000 INJECTION, SOLUTION INTRAVENOUS; SUBCUTANEOUS at 16:45

## 2019-07-13 RX ADMIN — SODIUM CHLORIDE: 9 INJECTION, SOLUTION INTRAVENOUS at 22:25

## 2019-07-13 RX ADMIN — POTASSIUM CHLORIDE 40 MEQ: 20 TABLET, EXTENDED RELEASE ORAL at 05:07

## 2019-07-13 RX ADMIN — SUCRALFATE 1 G: 1 SUSPENSION ORAL at 11:42

## 2019-07-13 RX ADMIN — ACETAMINOPHEN 1000 MG: 500 TABLET ORAL at 19:07

## 2019-07-13 RX ADMIN — GABAPENTIN 400 MG: 400 CAPSULE ORAL at 16:45

## 2019-07-13 RX ADMIN — METOPROLOL TARTRATE 25 MG: 25 TABLET, FILM COATED ORAL at 16:44

## 2019-07-13 RX ADMIN — DIPHENHYDRAMINE HYDROCHLORIDE, ZINC ACETATE: 2; .1 CREAM TOPICAL at 20:32

## 2019-07-13 RX ADMIN — FERROUS SULFATE TAB 325 MG (65 MG ELEMENTAL FE) 325 MG: 325 (65 FE) TAB at 08:44

## 2019-07-13 RX ADMIN — DEXTROSE MONOHYDRATE 30 ML: 50 INJECTION, SOLUTION INTRAVENOUS at 19:07

## 2019-07-13 RX ADMIN — RIFAMPIN 300 MG: 300 CAPSULE ORAL at 16:50

## 2019-07-13 RX ADMIN — METOPROLOL TARTRATE 25 MG: 25 TABLET, FILM COATED ORAL at 05:00

## 2019-07-13 RX ADMIN — OXYCODONE HYDROCHLORIDE 10 MG: 5 TABLET ORAL at 16:44

## 2019-07-13 RX ADMIN — ISONIAZID 300 MG: 300 TABLET ORAL at 05:00

## 2019-07-13 RX ADMIN — SODIUM CHLORIDE 500 ML: 9 INJECTION, SOLUTION INTRAVENOUS at 14:30

## 2019-07-13 RX ADMIN — GABAPENTIN 400 MG: 400 CAPSULE ORAL at 05:00

## 2019-07-13 RX ADMIN — GABAPENTIN 400 MG: 400 CAPSULE ORAL at 11:42

## 2019-07-13 RX ADMIN — OXYCODONE HYDROCHLORIDE 20 MG: 20 TABLET, FILM COATED, EXTENDED RELEASE ORAL at 05:08

## 2019-07-13 ASSESSMENT — ENCOUNTER SYMPTOMS
ABDOMINAL PAIN: 0
VOMITING: 0
DIARRHEA: 0
NAUSEA: 0
HEADACHES: 0
CHILLS: 0
MYALGIAS: 1
FEVER: 0
CONSTIPATION: 0

## 2019-07-13 NOTE — PROGRESS NOTES
Infectious Disease Progress Note    Author: Geno Junior M.D. Date & Time of service: 7/13/2019  8:59 AM    Chief Complaint:  Brain abscess, gluteal abscess  Vertebral OM     Interval History:  68 y.o. female admitted 5/29/2019 as a transfer from [facility] since 4/30/2019. + diabetes, SANTOSH of right hip with hardware, and breast cancer who was originally admitted to [facility] on 03/08/2019 for right hip and pelvic pain.  Work-up revealed a right iliopsoas lesion, gluteal abscess, and mult brain abscesses     6/24/2019 no fevers.  The hip pain is better.  No cough no shortness of breath  6/25/2019-no fevers.  The hip cultures remain negative.  She is scheduled for stereotactic biopsy on Friday.  6/26/2019 no fevers are noted.  Patient complains of back pain.  In significant distress.  6/27/2019-no fevers.  Continues to complain of pain.  No new issues overnight  6/28 AF WBC 6.6 planned for MRIs and biopsy today-somnolent  6/29 AF WBC 7.7 obtunded prelim path showed fungus on brain specimen  6/30 AF somnolent-not awakening to voice-slurred speech noted by Neurosurgery  7/1 AF more awake today; not oriented-ate about half of lunch c/o pain in joints  7/2 AF WBC 5.5 no new complaints  7/3 AF still c/o unrelenting right hip pain whenever awake-Falls asleep during exam  7/4 AF much more alert and conversant today-c/o left hip pain, unrelenting and would like parietal dressing changed. HA at surgical site.  Denies SE abx. No new neurodeficits  7/5 afebrile WBC 5.4.  Patient sleeping but arousable.  She denies any headache, nausea, vomiting.  7/6 afebrile WBC 6.5.  Patient sitting up in chair and having excruciating back pain  7/8 Pt has no specific complaints, she is mildly confused today.  She seems to be tolerating her medications with no significant lab abnormalities.  7/9 AF, O2 RA, Significant left hip pain today.  She does appear to be tolerating her antibiotics.  Ultrasound of bilateral lower extremities has  been ordered.  7/10 AF, O2 RA,  Plan for IR drainage of sacral collection soon.    AF, O2 RA,  Pt continued on RIPE and ampho.  She is tolerating well overall, requiring some mag and potassium replacement.  She is complaining of severe pain in left hip again today.  Plan for biopsy later today.     IR biopsy of R gluteal abscess/hip. AF, O2 2 L NC,   tolerating antibiotics so far.  She is quite somnolent today but per nursing she was oriented x4 earlier.      Review of Systems:  Review of Systems   Constitutional: Positive for malaise/fatigue. Negative for chills and fever.   Gastrointestinal: Negative for abdominal pain, constipation, diarrhea, nausea and vomiting.   Musculoskeletal: Positive for joint pain and myalgias.   Skin: Negative for rash.   Neurological: Negative for headaches.       Hemodynamics:  Temp (24hrs), Av.3 °C (97.3 °F), Min:36.1 °C (97 °F), Max:36.6 °C (97.8 °F)  Temperature: 36.6 °C (97.8 °F)  Pulse  Av.5  Min: 49  Max: 195   Blood Pressure : 135/76       Physical Exam:  Physical Exam   Constitutional: She is oriented to person, place, and time. She appears well-developed and well-nourished.   HENT:   Head: Normocephalic.   Craniotomy site with staples in place, no SOI   Eyes: Pupils are equal, round, and reactive to light. Conjunctivae and EOM are normal.   Cardiovascular: Normal rate, regular rhythm and normal heart sounds.    Pulmonary/Chest: Effort normal and breath sounds normal.   Abdominal: Soft. Bowel sounds are normal. She exhibits no distension. There is no tenderness. There is no rebound and no guarding.   Musculoskeletal: Normal range of motion. She exhibits tenderness.   Neurological: She is alert and oriented to person, place, and time.   Skin: Skin is warm and dry.   Psychiatric: She has a normal mood and affect. Her behavior is normal.       Meds:    Current Facility-Administered Medications:   •  senna-docusate  •  polyethylene glycol/lytes  •  magnesium  hydroxide  •  gabapentin  •  pramipexole  •  ferrous sulfate  •  diphenhydrAMINE  •  morphine injection  •  heparin  •  oxyCODONE CR  •  potassium chloride SA  •  magnesium chloride  •  pyridoxine  •  levETIRAcetam  •  isoniazid  •  riFAMPin  •  ethambutol  •  pyrazinamide  •  acetaminophen  •  Pharmacy Consult Request  •  labetalol  •  hydrALAZINE  •  NS **AND** acetaminophen **AND** diphenhydrAMINE **AND** D5W **AND** amphotericin b liposomal (AMBISOME) IV **AND** D5W **AND** NS  •  NS  •  lactobacillus rhamnosus  •  oxyCODONE immediate-release  •  sucralfate  •  acetaminophen  •  lidocaine  •  temazepam  •  cyclobenzaprine  •  Respiratory Care per Protocol  •  amLODIPine  •  cinacalcet  •  losartan  •  atorvastatin  •  omeprazole  •  metoprolol  •  ondansetron  •  ondansetron    Labs:  Recent Labs      07/11/19   0311  07/12/19   0353  07/13/19   0522   WBC  7.7  7.7  5.9   RBC  3.37*  3.06*  3.44*   HEMOGLOBIN  9.7*  9.0*  9.9*   HEMATOCRIT  30.5*  28.1*  35.4*   MCV  90.5  91.8  102.9*   MCH  28.8  29.4  28.8   RDW  54.4*  56.4*  64.1*   PLATELETCT  240  207  203   MPV  8.8*  8.8*  9.3     Recent Labs      07/12/19   0353  07/13/19   0522   SODIUM  138  137   POTASSIUM  4.0  4.5   CHLORIDE  110  111   CO2  22  16*   GLUCOSE  82  102*   BUN  8  13     Recent Labs      07/12/19   0353  07/13/19   0522   CREATININE  0.59  0.71       Imaging:  Ct-needle Bx-deep Bone    Result Date: 6/20/2019 6/19/2019 9:12 AM HISTORY/REASON FOR EXAM:  Concern for right hip osteomyelitis. TECHNIQUE/EXAM DESCRIPTION: Right hip deep bone biopsy with CT guidance. Low dose optimization technique was utilized for this CT exam including automated exposure control and adjustment of the mA and/or kV according to patient size. PROCEDURE: Informed consent was obtained. Conscious sedation with 25 mcg Fentanyl and 1 mg Versed was administered during the procedure with appropriate continuous patient monitoring by the radiology nurse. Sedation  duration: 30 minutes. Localizing CT images were obtained with the patient in prone position. The skin was prepped with ChloraPrep and draped in a sterile fashion. Following local anesthesia with 1% Lidocaine, a 13 G guiding needle was placed and needle position confirmed with CT. Using coaxial technique, an 14 G core biopsy needle was placed into the target lesion in the right posterior ilium and needle position confirmed with CT. A total of 2 samples were obtained in this fashion and submitted in saline for microbiology. The guiding needle was removed and limited CT images obtained through the biopsy site show no evidence of significant parenchymal hemorrhage. The patient tolerated the procedure well. COMPARISON: None available. COMPLICATIONS: No immediate complications. FINDINGS: The localizing CT images show satisfactory needle position within the target location.     CT GUIDED RIGHT ILIUM DEEP BONE BIOPSY. SAMPLES WERE SENT TO MICROBIOLOGY FOR ANALYSIS.    Ct-pelvis With    Result Date: 6/17/2019 6/17/2019 7:23 AM HISTORY/REASON FOR EXAM:  Pelvic abscess TECHNIQUE/EXAM DESCRIPTION:  CT pelvis without IV contrast.   Sequential axial CT images were obtained from the lower lumbar spine to the proximal femurs following the administration of 100 cc Omnipaque 350 intravenous contrast. Low dose optimization technique was utilized for this CT exam including automated exposure control and adjustment of the mA and/or kV according to patient size. COMPARISON:  None FINDINGS: The visualized portions of the small bowel and colon appear within normal limits. The bladder is within normal limits. Inferior RIGHT iliacus fluid collection now measures 1.8 x 2.1 cm, previously 1.8 x 2.5 cm. More cephalad RIGHT iliacus fluid collection now measures 1.2 x 2.1 cm, previously 2 x 1.5 cm. Unhealed fractures with irregular margins or reflux demonstrated in the RIGHT iliac bone and the sacrum with a screw track extending into the LEFT  iliac bone. There are areas of lucency irregularity in the LEFT iliac bone. There is anterolisthesis of L5 on S1 with irregular lucencies adjacent to the narrowed disc space. There is a 2 x 1.2 cm hyperdense area in the RIGHT gluteal musculature on axial image 14 of series 2. There is overlying soft tissue edema. There are mildly prominent BILATERAL inguinal lymph nodes.     1.  No significant interval change in the size of the RIGHT iliac muscle fluid collections 2.  Hyperdense RIGHT gluteal lesion moderately suspicious for pseudoaneurysm with adjacent hematoma. 3.  Changes in the RIGHT iliac bone, BILATERAL sacrum and LEFT iliac bone suspicious for osteomyelitis 4.  Changes at L5-S1 suspicious for discitis osteomyelitis    [id number] View    Result Date: 7/12/2019 7/12/2019 12:49 AM HISTORY/REASON FOR EXAM: Abdominal Pain TECHNIQUE/EXAM DESCRIPTION:  Single AP view the abdomen. COMPARISON:  None FINDINGS: Limited views of the lung bases are clear. Moderate stool is seen throughout the colon, otherwise bowel gas pattern is nonspecific. Levoscoliosis and degenerative changes of the lumbar spine are seen. Atherosclerotic calcifications are noted.     1.  Moderate stool in the colon suggests changes of constipation, otherwise nonspecific bowel gas pattern 2.  Atherosclerosis    Ir-us Guided Piv    Result Date: 7/8/2019  EXAMINATION:                                                                    HISTORY/REASON FOR EXAM:  Ultrasound Guided PIV.  TECHNIQUE/EXAM DESCRIPTION AND NUMBER OF VIEWS:  Peripheral IV insertion with ultrasound guidance.  The procedure was prepared using maximal sterile barrier technique including sterile gown, mask, cap, and donning of sterile gloves following appropriate hand hygiene and/or sterile scrub. Patient skin site was prepped with 2% Chlorhexidine solution.   FINDINGS: Peripheral IV insertion with Ultrasound Guidance was performed by qualified imaging nursing staff without the  assistance of a Radiologist.      Ultrasound-guided PERIPHERAL IV INSERTION performed by qualified nursing staff as above.     Mr-brain-with & W/o    Result Date: 6/22/2019 6/22/2019 10:48 AM HISTORY/REASON FOR EXAM:  Headache, new, meningitis or encephalitis suspected. TECHNIQUE/EXAM DESCRIPTION: MRI of the brain without and with contrast, with diffusion. The study was performed on a [employer] Signa 1.5 Miranda MRI scanner. Spoiled-GRASS sagittal, thin-section T2 axial, T1 coronal, T1 postcontrast coronal, T1 postcontrast axial, FLAIR coronal and diffusion-weighted axial images were obtained of the whole brain. 6 mL Gadavist contrast were administered intravenously. COMPARISON:  6/8/2019. FINDINGS: There is a pattern of mild cerebral atrophy manifest as prominence of sulcal markings over the convexities and vertex along with mild ventriculomegaly. The bony calvaria are intact. There is no evidence of extra-axial fluid collection or intracranial mass effect. Innumerable supra and infratentorial enhancing nodules are again noted. A lesion in the right thalamus appears somewhat more prominent when compared to the previous study measuring approximately 8 mm, previously approximately 5 mm. A lesion in the right frontal operculum gray-white junction demonstrates increased enhancement but overall unchanged size. A lesion in the right posterior hippocampus appears more somewhat larger and with increased enhancement. Enhancement. Appear to demonstrate associated diffusion restriction. There is an underlying pattern of mild supratentorial white matter disease with scattered foci of bright T2 and FLAIR signal in the subcortical and deep white matter of both hemispheres consistent with small vessel ischemic change versus demyelination or gliosis. There are normal flow voids in the cavernous carotid arteries and M1 segments. There are normal flow voids in the distal vertebral basilar system. There are normal flow voids in the dural  venous sinuses. The visualized paranasal sinuses and mastoid air cells appear clear.     1.  Innumerable supra and infratentorial enhancing nodules are again noted throughout the brain parenchyma with multiple lesions appearing somewhat larger and with increased enhancement. No associated diffusion restriction. Unchanged differential considerations including bacterial or fungal infection versus metastatic disease. 2.  Mild diffuse cerebral substance loss. 3.  Mild microangiopathic ischemic change versus demyelination or gliosis.    Mr-lumbar Spine-with & W/o    Result Date: 6/28/2019 6/28/2019 9:56 AM HISTORY/REASON FOR EXAM:  Back pain or radiculopathy, cancer or infection suspected. TECHNIQUE/EXAM DESCRIPTION: MRI of the lumbar spine without and with contrast. The study was performed on a [employer] Signa 1.5 Miranda MRI scanner. T1 sagittal, T2 fast spin-echo sagittal, and T2 axial images were obtained of the lumbar spine. T1 post-contrast fat-suppressed sagittal images were obtained. Optional T2 fat-suppressed sagittal and T1 post-contrast axial images may be obtained. 6 mL Gadavist contrast was administered intravenously. COMPARISON:  MRI scan of the lumbar spine 3/26/2019 FINDINGS: There is a grade 2-3 spondylolisthesis at the L5-S1 level with bilateral spondylolysis of pars interarticularis. There is been interval removal of the posterior fixation hardware since previous exam. There is diffuse decreased T1 signal intensity throughout the majority of the L5 vertebral body and the first and second sacral segments. Further there is heterogeneous increased T2 signal intensity and enhancement in these regions. There is also evidence for enhancing soft tissue anterior to the L5 vertebral body and first through third sacral segments. There are elliptical nonenhancing areas in this region of abnormal enhancing soft tissue anterior to the L5-S1 disc space and first sacral segment. Additionally there are ovoid nonenhancing  areas in  the first and third sacral segments. There is a 4 x 2.1 cm somewhat ovoid fluid signal intensity collection lateral to the right side of the iliac bone involving the right gluteal musculature. Additionally there is a screw tract coursing transversely through the iliac bones and sacrum which  has heterogeneous enhancement. There is enhancing soft tissue signal intensity extending from the screw tract in the right ilium into the right-sided gluteal soft tissues. There is also an ovoid area of decreased T1 and increased T2 signal intensity in the right posterior ilium. There is a 3.3 cm multiloculated fluid signal intensity collection involving the right sacroiliac joint. The conus is normal in position and signal. There is no abnormal cord enhancement. There is mild to moderate disc space narrowing at the L5-S1 level which has evidence of increased T2 signal intensity and enhancement. At T12-L1 through L4-5 disc height and signal is normal. Level specific findings: L5-S1 level minimal annular bulging. Severe bilateral neural foraminal stenosis secondary to the spondylolisthesis. L4-5 level minimal posterior spurring and annular bulging. L3-4 level minimal annular bulging. L2-3 level minimal annular bulging. L1-2 level minimal annular bulging.     1.  Grade 2-3 spondylolisthesis at the L5-S1 level with bilateral spondylolysis of the pars interarticularis. This causes severe bilateral neural foraminal stenosis at this level. 2.  Interval removal of posterior fusion hardware at the lumbosacral junction. 3.  Diffuse osteomyelitis involving the L5 vertebral body and the first 3 sacral segments. 4.  Chronic discitis at the L5-S1 level with anterior paraspinous abscess at this level and anterior to the S1 sacral segment. 5.  Smaller intraosseous bone bone abscesses or areas of necrosis noted in the sacrum. 6.  There is also evidence of osteomyelitis involving the jose antonio and sacrum bilaterally along the course of the  "previous sacral fixation screw. There is a large 4 cm abscess noted in the right gluteal soft tissues in a smaller 3 cm chronic appearing abscess in the left gluteal musculature. 7.  There is a 1.5 cm intraosseous abscess or area of necrosis in the right posterior ilium. 8.  There is a 3.3 cm centimeters multiloculated abscess anterior to the right sacroiliac joint.    Us-extremity Venous Lower Bilat    Result Date: 2019   Vascular Laboratory  CONCLUSIONS  Normal bilateral superficial and deep venous examination of the lower  extremities.  MARLENA CLIFFORD  Exam Date:     2019 11:10  Room #:     Inpatient  Priority:     Routine  Ht (in):             Wt (lb):  Ordering Physician:        APRIL MERA  Referring Physician:       SAMARIA Hanks  Sonographer:               Sumi Sales RVT  Study Type:                Complete Bilateral  Technical Quality:         Adequate  Age:    68    Gender:     F  MRN:    [id number]  :    1950      BSA:  Indications:     Personal history of venous thrombosis and embolism  CPT Codes:       65823  ICD Codes:       Z86.71  History:         Per physician order \"history of reported DVT\", patient is                   poor historian. No previous duplex on file. DVT.  Limitations:  PROCEDURES:  Bilateral lower extremity venous duplex imaging.  The following venous structures were evaluated: common femoral, profunda  femoral, greater saphenous, femoral, popliteal , peroneal and posterior  tibial veins.  Serial compression, augmentation maneuvers,  color and spectral Doppler  flow evaluations were performed.  FINDINGS:  Bilateral lower extremities -  Complete color filling and compressibility with normal venous flow dynamics  including spontaneous flow, response to augmentation maneuvers, and  respiratory phasicity.  The peroneal and posterior tibial veins are difficult to assess for  compressibility, but flow response to augmentation is demonstrated.  Donovan Finley  " (Electronically Signed)  Final Date:      09 July 2019                   12:51    Ct-needle Bx-abd-retroperitonl    Result Date: 7/12/2019 7/11/2019 5:25 PM HISTORY/REASON FOR EXAM: Pelvic osteomyelitis. TECHNIQUE/EXAM DESCRIPTION: Pelvic abscess/phlegmon biopsy with CT guidance. Low dose optimization technique was utilized for this CT exam including automated exposure control and adjustment of the mA and/or kV according to patient size. PROCEDURE: Informed consent was obtained. Conscious sedation with 100 mcg Fentanyl and 2 mg Versed was administered during the procedure with appropriate continuous patient monitoring by the radiology nurse. Sedation duration: 30 minutes Localizing CT images were obtained with the patient in prone position. Of note, in the left gluteal region there is a soft tissue attenuation region immediately posterior to the right ilium which corresponds to the previously demonstrated location of the  suspected pseudoaneurysm on the contrast-enhanced study dated 5/28/2019. This appeared significantly increased in size. On palpation, this was in fact pulsatile and likely consistent with significant interval enlargement of the previously demonstrated left gluteal pseudoaneurysm. This measured approximately 3.3 cm. The skin was prepped with ChloraPrep and draped in a sterile fashion. Following local anesthesia with 1% Lidocaine, a 17 G guiding needle was placed and needle position confirmed with CT. Using coaxial technique, an 18 G core biopsy needle was placed into the target location and needle position confirmed with CT. A total of 3 samples were obtained in this fashion and submitted in formalin. 3 additional core specimens were obtained and placed in saline for microbiology. The guiding needle was removed and limited CT images obtained through the biopsy site show no evidence of significant hemorrhage. The patient tolerated the procedure well. COMPARISON: None available. FINDINGS: The  localizing CT images show satisfactory needle position within the target lesion.  Interval increase in right gluteal pseudoaneurysm.     1.  CT GUIDED biopsy of a right pelvic phlegmon. 2. Significant interval enlargement of a right gluteal pseudoaneurysm. CTA and consideration for possible intervention either with direct thrombin injection or endovascular treatment was suggested. This was conveyed to Dr. Alatorre on 7/11/2019 via [place] text at 1750 hours.    Us-extremity Non Vascular Unilateral Right    Result Date: 6/22/2019 6/22/2019 2:45 PM HISTORY/REASON FOR EXAM:  hx of right hip hardware removal 6/15; swelling Evaluate right hip for fluid collection. TECHNIQUE/EXAM DESCRIPTION AND NUMBER OF VIEWS: Limited ultrasound of the right hip COMPARISON: 6/17/2019 FINDINGS: No right hip joint effusion. No soft tissue fluid collection.     No right hip joint effusion. No soft tissue fluid collection.    Mr-stealth Brain With & W/o    Result Date: 6/28/2019 6/28/2019 9:57 AM HISTORY/REASON FOR EXAM:  Brain metastasis follow-up TECHNIQUE/EXAM DESCRIPTION AND NUMBER OF VIEWS: MRI of the brain without and with contrast, Stealth protocol. Fiducial markers for the Stealth stereotactic system were placed on the scalp. Thin-section 2 mm T2 fast spin-echo true axial images were obtained of the whole brain. Thin-section 1.5 mm T1-weighted postcontrast axial 3D BRAVO images were obtained of the whole brain. 3D reconstructions in the Coronal and Sagittal planes were generated from the axial 3D BRAVO postcontrast sequence. The study was performed on a [employer] Signa 1.5 Miranda MRI scanner. 6 mL Gadavist contrast was administered intravenously. COMPARISON:  MRI scans brain 3/7/2019 FINDINGS: There are innumerable varying size nodular enhancing lesions throughout the brain parenchyma. Many of these lesions are at the gray-white junction but there are multiple lesions arising in the region of the basal ganglia. The largest lesion is in the  right thalamus and measures 9 mm in greatest diameter. There is a moderate amount of surrounding increased T2 signal intensity in the right thalamus. This lesion has increased in size since previous exam. The majority of the lesions have not changed significantly since previous exam. The calvariae are unremarkable. There are no extra-axial fluid collections. The ventricular system and basal cisterns are mild to moderately prominent. There is mild-to-moderate prominence of cortical sulci and gyri. There are no mass effects or shift of  midline structures. There are no hemorrhagic lesions. Vascular flow voids in the vertebrobasilar and carotid arteries, [place], and dural venous sinuses are intact. The paranasal sinuses and mastoids in the field of view are unremarkable.     1.  Innumerable subcentimeter brain metastases, many at the gray-white junction but also multiple noted throughout the basal ganglia and brainstem. 2.  Largest index lesion is noted in the right thalamus and has increased in size since previous exam and currently measures 9 mm and has a moderate amount of surrounding vasogenic edema.      Micro:  Results     Procedure Component Value Units Date/Time    GRAM STAIN [[id number]] Collected:  07/11/19 1745    Order Status:  Completed Specimen:  Tissue Updated:  07/12/19 2241     Significant Indicator .     Source TISS     Site Right Hip Cores     Gram Stain Result No organisms seen.    Narrative:       Radiology specimen Hold specimen 14 days per Dr. Junior    Acid Fast Stain [[id number]] Collected:  07/11/19 1745    Order Status:  Completed Specimen:  Tissue Updated:  07/12/19 2226     Significant Indicator NEG     Source TISS     Site Right Hip Cores     AFB Smear Results No acid fast bacilli seen.    Fungal Culture [[id number]] Collected:  07/11/19 1745    Order Status:  Completed Specimen:  Tissue Updated:  07/12/19 2226     Significant Indicator NEG     Source TISS     Site Right Hip Cores  "    Culture Result Culture in progress.    AFB Culture [[id number]] Collected:  07/11/19 1745    Order Status:  Completed Specimen:  Tissue Updated:  07/12/19 2226     Significant Indicator NEG     Source TISS     Site Right Hip Cores     Culture Result Culture in progress.     AFB Smear Results No acid fast bacilli seen.    CULTURE TISSUE W/ GRM STAIN [[id number]] Collected:  07/11/19 1745    Order Status:  Completed Specimen:  Other Updated:  07/12/19 0638    Anaerobic Culture [[id number]] Collected:  07/11/19 1745    Order Status:  Completed Specimen:  Other Updated:  07/12/19 0638    AFB Culture [[id number]]     Order Status:  No result Specimen:  Tissue from Right Hip     Fungal Culture [[id number]]     Order Status:  No result Specimen:  Tissue from Right Hip           Assessment:  Active Hospital Problems    Diagnosis   • *Brain lesion [G93.9]   • Granulomatous disease  [L92.9]   • Abscess of right iliac muscle and right gluteus medius muscle [L02.91]   • Pseudoaneurysm following procedure ([facility]) [I97.89, I72.9]   • Sepsis ([facility]) [A41.9]   • Hypomagnesemia [E83.42]   • Hypercalcemia [E83.52]   • RLS (restless legs syndrome) [G25.81]   • Acquired circulating anticoagulants ([facility]) [D68.318]   • Non-severe protein-calorie malnutrition ([facility]) [E46]   • History of DVT (deep vein thrombosis) [Z86.718]   • Essential hypertension [I10]   • Chronic hyponatremia [E87.1]   • History of breast cancer [Z85.3]   • COPD (chronic obstructive pulmonary disease) ([facility]) [J44.9]   • Chronic pain [G89.29]     Interval 24 hour assessment  AF, O2 RA,    Labs reviewed  Studies reviewed     Micro reviewed    Pt continued on ampho and RIPE.  She is more alert today, conversant and asking for advance in diet. Left hip pain ongoing but improved.        Assessment/Plan:     Encephalopathy, improved today      Brain abscesses   Query CNS fungal infection vs mycobacterial or other   MRI 4/23 \"supra and infratentorial brain parenchyma. Decrease in " the size of the lesions. Interval reduction in the extent of the surrounding white matter edema consistent with improvement/treatment response of the most of the multifocal brain abscesses.  MRI 5/21 showed innumerable microabscesses vs mets  Abx (vancomycin, cefepime and Flagyl) discontinued on 5/23 after ~8 weeks of treatment-developed new fevers on 6/4  MRI on 6/8 with interval progression of supra and infratentorial intra--axial nodules and associated edema.  New right thalamus lesion. Radiology favors infection over neoplasm though both remain considerations (had been off abx)  Mult blood cultures neg  CHAS neg 3/15 and 5/24  MRI 6/22 worsening CNS lesions  S/p right craniotomy and resection of mass with biopsy on 6/28. Biopsy-per note fungal appearing elements seen per Neurosurgery-discussed with pathologist who examined the frozen section and there were no fungal appearing elements.   +necrotizing granulomas. All stains negative in EPIC  Fungal culture- negative to date  Bacterial cultures-negative to date  AFB culture-in progress  Pathology- focal necrotizing granulomatous inflammation; no fungal elements or acid-fast bacilli on stains.  No malignancy identified  Check electrolytes daily and replace as needed  Repeat cocci - negative      --- Continue liposomal Ampho B for now-  carefully monitor renal function, potassium and magnesium-if she develops significant electrolyte abnormalities or begins to develop kidney injury will stop  --- Ordered magnesium as none for couple of days, also ordered for a.m.     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  Still with specimen in paraffin block, formalin was used   --- Follow-up brain biopsy specimen sent to [facility] for PCR testing, bacterial, fungal, AFB   --- Follow-up serologic testing sent out to [facility]  Brucella culture and ab - pending   Coxiella ab - negative   Histo ab serum & antigen urine- pending   Blasto ab- negative & antigen urine - pending      --- Discussed MRI findings with radiologist and there is easily obtained tissue/fluid in the sacrum and area that has been worsening despite therapy, per radiology images most consistent to TB or Brucellosis  -Pelvic biopsyon 7/11 - fluid drainage and specimen sent to [facility] again for AFB, bacterial and fungal PCR- have alerted micro and send out lab -confirmed with Alex this was sent out on 7/12   --- Also send biopsy specimens to [facility] for review for malignant cells, stains for fungal and AFB    --- Send biopsy specimen to [facility] micro for bacterial gram stain and culture, fungal smear and culture, AFB stain and culture- will instruct micro to hold for 14 days       --- Repeat MRI brain  W & W/O  - she has been on new therapy for ~ 2 weeks to see if stable or improved- if worse will stop ampho       Gluteal and iliopsoas abscess   OM L5, S1-3, new this admission  Recurrent abscesses, additional work  Adjacent to hardware in right hip (placed 12/17/18)  CT 4/23 \"Fluid collections within the right gluteal and iliacis muscles.. The collection within the right gluteal muscle has unchanged while the fluid collection within the right iliacis muscle is increased somewhat in size.\" 2.7 cm  Cultures 3/29 and 4/4 neg  MRI on 5/20/2019 - interval decrease in collection in R gluteal muscle and persistent multiloculated collection in R iliacus muscle.   CT 5/28 no change  s/p drainage on 5/30/2019-cultures negative  S/p removal hardware 6/5-no cultures done  1, 3 beta glucan neg  CT on 6/8 with residual abscess in the " right iliacus muscle, 2.5 x 1.8 cm, slightly smaller than prior  s/p CT-guided deep bone biopsy 6/19/2019.  Cultures remain negative; path not reported   MRI 6/28 chronic discitis, diffuse OM L5, 3 and 4 cm abscesses right glute, 1.5 cm abscess or necrosis right posterior ileum, 3.3 cm abscess right SI joint     --- Ampho B as above and TB therapy     Right gluteal pseudoaneurysm, significant interval enlargement noted by IR during procedure on 7/11  -CTA planned      +QuantiGold  Query disseminated TB  Path with necrotizing granulomas  AFB stain neg  AFB cultures pending  All prior AFB cxs still neg  Started RIPE +B6 7/3     --- Repeat MRI will be needed  --- Monitor for visual changes while on ethambutol- please ask if ophthalmology can get baseline vision as well as color vision testing done while inpatient     Type 2 DM  Hemoglobin A1c 6.3   Keep BS under 150 to help control current infection    Discussed with Dr. Alatorre.  ID will follow.

## 2019-07-13 NOTE — THERAPY
"Speech Language Therapy dysphagia treatment completed.   Functional Status:  Patient seen with the last portion of her dinner tray. She did not want to eat any of the meat but ate more sweet potatoes and drank thin liquid from the cup. She was not grunting or moaning in pain like during the previous session. She was feeding herself small bites. Labial residue on the left noted but she did not have any oral pocketing. No overt signs of aspiration noted. She refused to try solid foods and just wanted to recline in bed. Continue diet. SLP will continue to follow and trial upgrades as appropriate.      Recommendations: 1) Recommend to continue current Dysphagia 1, thin liquid diet. 2) She appears to require direct supervision only when she is in pain and grunting and moaning through her meal.    Plan of Care: Will benefit from Speech Therapy 3 times per week  Post-Acute Therapy: Recommend inpatient transitional care services for continued speech therapy services.        See \"Rehab Therapy-Acute\" Patient Summary Report for complete documentation.     "

## 2019-07-13 NOTE — PROGRESS NOTES
Pt c/o hip pain while up the BSC, pt had another large formed BM. Pt showered with staff assistance.

## 2019-07-13 NOTE — CARE PLAN
Problem: Bowel/Gastric:  Goal: Normal bowel function is maintained or improved  Outcome: PROGRESSING AS EXPECTED  + 2 BM large formed     Problem: Skin Integrity  Goal: Risk for impaired skin integrity will decrease  Outcome: PROGRESSING AS EXPECTED  Encouraging mobilization and turning every 2 hours. Educated about preventing skin break down and pt verbalizes understanding. Repositioned with pillows and draw sheet every 2 hours, pt continues to remove pillows and is able to turn self. Barrier wipes, cream, and lotion in use. Heels floated with pillows.

## 2019-07-13 NOTE — PROGRESS NOTES
2 RN skin check completed with Joanna NGUYEN.    Devices in place:   Pt refused SCDs.     Areas on concern:   Right buttocks biopsy site, CDI.   Sacrum pink and blanching.   BLE dusky and dry, heels pink and blanching, bruising noted.   Right head incision well approximated with staples, SHAZIA.     Preventative measures in place:   Q 2 turns with pillows.   Barrier wipes and cream.

## 2019-07-13 NOTE — PROGRESS NOTES
2 RN skin check complete with PATRICK Crespo.   Devices in place- PIV.   Skin assessed under devices - yes.   No new potential pressure ulcers noted.   Old surgical incision to right side of head has staples SHAZIA, CDI.    Elbows are red and blanching.  Bilat heels and feet are dry, flaky, and boggy.  Bilat knees reddened but blanching.  The following interventions in place - pressure redistribution mattress, barrier cream, barrier wipes

## 2019-07-13 NOTE — PROGRESS NOTES
Pt alert, confused at times, easily reorients. Pt alternates speaking in English and Burkinan. Pt denies pain, denies N/T, HERNANDEZ, good appetite. Ate dinner x2, 100% milkshake, had 2 large formed BMs, pt denies abdominal pain, voiding without difficulty, up to BSC with 1-2 assist.

## 2019-07-13 NOTE — PROGRESS NOTES
Hospital Medicine Daily Progress Note    Date of Service  7/13/2019    Chief Complaint    68 y.o. female admitted 5/29/2019 with pelvic pain and confusion .     Hospital Course    68 y.o. female transferred from [facility] on 5/29/2019 with persistent right gluteal abscesses since sacral fracture repair in December with multiple IR drainage.     Interval imaging to date:  MRI brain showed increase in brain lesions  CHAS done by Dr. Potter showed no vegetations.  MRI brain on 6/7/19 saw progression of the supra and infratentorial intra-axial nodules with edema, prompting a Neurosurgery consult.     Repeat CT on 6/17 showed no significant interval change in the size of the right iliacus muscle fluid collections, with changes in the right iliac bone, bilateral sacrum, and left iliac bone suspicious for osteomyelitis, and changes at L5-S1 suspicious for discitis/osteomyelitis, along with hyperdense right gluteal lesion, moderately suspicious for postoperative pseudoaneurysm with adjacent hematoma.      Cultures:  Pelvic abscess drainage on 5/30 with negative results.  Biopsy of the hip showed no AFB, organism, or fungal elements    Patient had re evaluation by Dr. Nguyen and she underwent right-sided craniotomy with resection of right sided superficial mass on 6/28/2019.  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  Patient started on IV Amphotericin.  Path +focal necrotizing granulomatous inflammation with a marked histiocytic infiltrate.    Interval Problem Update  Afebrile, improved mentation today, alert and does not look in pain  Electrolytes improving  Had IR guided bx of abscesses with extensive lab workup planned, all those cultures so far neg  Previously had negative cultures but +ve Quant so on RIPE.   Increased gabapentin with crosstitration of pramipexole, seems to have worked well    Message sent to Dr Haas ([email or web address]) regarding vision testing request while inpatient and on ethambutol, no response      Neutrophil function NORMAL, r/o chronic granulomatous dz.   D/W Junior ID about stopping ampho, will order MRI w/wo to reassess lesions  CTA pelvis at some point as well    Consultants/Specialty  Neurosurgery, Dr. Nguyen  Pulmonary Dr. Salinas  Infectious disease  IR    Code Status  DNR/DNI    Disposition  Unclear  High complexity  Remains inpatient  Anticipate SNF when more stable    Review of Systems  Review of Systems   Unable to perform ROS: Mental acuity     Physical Exam  Temp:  [36.1 °C (97 °F)-36.6 °C (97.8 °F)] 36.6 °C (97.8 °F)  Pulse:  [73-81] 74  Resp:  [16-18] 17  BP: (130-142)/(65-79) 135/76  SpO2:  [96 %-100 %] 97 %    Physical Exam   Constitutional: She is oriented to person, place, and time. No distress.   HENT:   Head: Normocephalic and atraumatic.   Mouth/Throat: Oropharynx is clear and moist.   Right cranio site present with staples   Eyes: Conjunctivae and EOM are normal. Right eye exhibits no discharge. Left eye exhibits no discharge. No scleral icterus.   Neck: Normal range of motion. Neck supple.   Cardiovascular: Normal rate and intact distal pulses.    No murmur heard.  Pulmonary/Chest: No stridor. No respiratory distress. She has no wheezes. She has no rales.   Abdominal: Soft. She exhibits no distension. There is no tenderness.   Musculoskeletal: She exhibits no edema or tenderness.   Generalized 4/5 strength.  Right hip with palpable induration  Constant movement LEs at ankles and knees    Neurological: She is alert and oriented to person, place, and time. Coordination abnormal.   No focal weakness  Consistent RLS type movement LEs  No TTP   Skin: Skin is warm and dry. She is not diaphoretic. No pallor.   Brawny induration LEs   Psychiatric:   Anxious but improved   Vitals reviewed.    Fluids    Intake/Output Summary (Last 24 hours) at 07/13/19 1117  Last data filed at 07/13/19 0600   Gross per 24 hour   Intake          1950.83 ml   Output              350 ml   Net          1600.83  ml       Laboratory  Recent Labs      07/11/19   0311  07/12/19   0353  07/13/19   0522   WBC  7.7  7.7  5.9   RBC  3.37*  3.06*  3.44*   HEMOGLOBIN  9.7*  9.0*  9.9*   HEMATOCRIT  30.5*  28.1*  35.4*   MCV  90.5  91.8  102.9*   MCH  28.8  29.4  28.8   MCHC  31.8*  32.0*  28.0*   RDW  54.4*  56.4*  64.1*   PLATELETCT  240  207  203   MPV  8.8*  8.8*  9.3     Recent Labs      07/12/19   0353  07/13/19   0522   SODIUM  138  137   POTASSIUM  4.0  4.5   CHLORIDE  110  111   CO2  22  16*   GLUCOSE  82  102*   BUN  8  13   CREATININE  0.59  0.71   CALCIUM  9.6  10.0                 Assessment/Plan  * Brain lesion- (present on admission)   Assessment & Plan    With right gluteal, multiloculated right iliac muscle fluid collections.     CHAS neg 3/15 and 5/24.    Cultures 3/29 and 4/4 neg  She has completed multiple courses of IV antibiotics  s/p drainage on 5/30/2019, cultures negative  s/p removal hardware  1, 3 beta glucan neg.      CT on 6/8 with residual abscess in the right iliacus muscle, 2.5 x 1.8 cm, slightly smaller than prior  s/p CT-guided deep bone biopsy 6/19/2019.  Cultures remain negative; path not reported     MRI 6/28 chronic discitis, diffuse OM L5, 3 and 4 cm abscesses right glute, 1.5 cm abscess or necrosis right psoterior ileum, 3.3 cm abscess right SI joint.    Status post brain biopsy - 6-28-19   Q gold +   Brain biopsy + positive focal necrotizing granulomas-may suggest mycobacterial infection. ? Fungal infxn, other cause.    Continue IV amphotericin per ID.  Has had electrolyte wasting with recurrent hypomagnesemia.  Continue schedule KCl and magnesium supplements  Monitor electrolytes closely  Continue Keppra for sz prophylaxis.     Patient with recurrent abscesses with progression, osteomyelitis and granulomas-  cultures have been negative  NORMAL neutrophil function test.     Consideration of stoping Ampho soon  Check MRI brain with and without   Follow up cultures, potentially something over  weekend    Contacted Dr Haas for vision testing on ethambutol      Granulomatous disease    Assessment & Plan    Cultures negative with recurrent, progressive abscesses, OM.  Unclear cause.  Continue treatment for possible infectious causes as above.      NORMAL Neutrophil function test- dihydrorhodamine 123 screening test.      Abscess of right iliac muscle and right gluteus medius muscle- (present on admission)   Assessment & Plan    Recurrent issue since sacral fracture repair in December 2018. Has had multiple IR drainage and antibiotics. Recent cultures remain unrevealing.    s/p biopsy of the nonhealing right iliac fracture area. Showed no AFB, organism, or fungal elements.  Cultures negative.  Pain control.  Increased OxyContin, still uncontrolled and increased PRN morphine dose    Continue Lidoderm patch, PRN oxycodone, IV morphine for severe breakthrough pain  Continue with  RI PE w B6 and amphotericin per ID.       Sepsis (HCC)   Assessment & Plan    This is sepsis (without associated acute organ dysfunction).    Sepsis resolved  Continue amphotericin , RIPE started.  Monitor vital signs     Hypomagnesemia- (present on admission)   Assessment & Plan    Corrected  Scheduled magnesium  Monitor electrolytes       Hypercalcemia- (present on admission)   Assessment & Plan    Has resolved  Continue to monitor calcium     RLS (restless legs syndrome)- (present on admission)   Assessment & Plan    Appears to have augmented   Giving her severe distress, no sleep  Cross titrate with incr dose gabapentin, start lowering pramipexole dose     Acquired circulating anticoagulants (HCC)- (present on admission)   Assessment & Plan    Anticoagulation on hold given recent brain biopsy.  Discussed with Dr. Nguyen- Ok to start AC per surgery .  US legs negative for DVT  Start DVT pplx     Non-severe protein-calorie malnutrition (HCC)- (present on admission)   Assessment & Plan    Advance diet as tolerated  Dietary  supplements       History of DVT (deep vein thrombosis)- (present on admission)   Assessment & Plan    Hold anticoagulation given recent brain biopsy and lumbar osteomyelitis.  LE duplex negative     Essential hypertension- (present on admission)   Assessment & Plan    Continue metoprolol losartan and amlodipine  Monitor blood pressure     Chronic hyponatremia- (present on admission)   Assessment & Plan    Recurrent  Continue to monitor Na.     Chronic pain- (present on admission)   Assessment & Plan    Continue pain management       History of breast cancer- (present on admission)   Assessment & Plan    S/p left mastectomy and breast implant.  Follow-up on intraoperative final pathology results     COPD (chronic obstructive pulmonary disease) (HCC)- (present on admission)   Assessment & Plan    Stable   RT protocol and oxygen        Discussed with multi disciplinary team plan of care.     VTE prophylaxis: SCDs, start heparin sq for now, consider brooks

## 2019-07-13 NOTE — CARE PLAN
Problem: Safety  Goal: Will remain free from injury  Outcome: PROGRESSING AS EXPECTED  Pt. Instructed on fall protocol rational    Problem: Knowledge Deficit  Goal: Knowledge of disease process/condition, treatment plan, diagnostic tests, and medications will improve  Outcome: PROGRESSING AS EXPECTED  Pt. Informed of plan for the day and instructed on any changes.

## 2019-07-14 ENCOUNTER — APPOINTMENT (OUTPATIENT)
Dept: RADIOLOGY | Facility: MEDICAL CENTER | Age: 69
DRG: 356 | End: 2019-07-14
Attending: HOSPITALIST
Payer: MEDICARE

## 2019-07-14 LAB
ANION GAP SERPL CALC-SCNC: 8 MMOL/L (ref 0–11.9)
BRUCELLA AB TITR SER AGGL: NORMAL {TITER}
BUN SERPL-MCNC: 6 MG/DL (ref 8–22)
CALCIUM SERPL-MCNC: 9.6 MG/DL (ref 8.5–10.5)
CHLORIDE SERPL-SCNC: 109 MMOL/L (ref 96–112)
CO2 SERPL-SCNC: 22 MMOL/L (ref 20–33)
CREAT SERPL-MCNC: 0.54 MG/DL (ref 0.5–1.4)
ERYTHROCYTE [DISTWIDTH] IN BLOOD BY AUTOMATED COUNT: 55.1 FL (ref 35.9–50)
GLUCOSE SERPL-MCNC: 94 MG/DL (ref 65–99)
HCT VFR BLD AUTO: 31.1 % (ref 37–47)
HGB BLD-MCNC: 9.7 G/DL (ref 12–16)
MAGNESIUM SERPL-MCNC: 1.3 MG/DL (ref 1.5–2.5)
MCH RBC QN AUTO: 29.2 PG (ref 27–33)
MCHC RBC AUTO-ENTMCNC: 31.2 G/DL (ref 33.6–35)
MCV RBC AUTO: 93.7 FL (ref 81.4–97.8)
PLATELET # BLD AUTO: 200 K/UL (ref 164–446)
PMV BLD AUTO: 9 FL (ref 9–12.9)
POTASSIUM SERPL-SCNC: 3.9 MMOL/L (ref 3.6–5.5)
RBC # BLD AUTO: 3.32 M/UL (ref 4.2–5.4)
SODIUM SERPL-SCNC: 139 MMOL/L (ref 135–145)
WBC # BLD AUTO: 5.1 K/UL (ref 4.8–10.8)

## 2019-07-14 PROCEDURE — 700102 HCHG RX REV CODE 250 W/ 637 OVERRIDE(OP): Performed by: INTERNAL MEDICINE

## 2019-07-14 PROCEDURE — 99233 SBSQ HOSP IP/OBS HIGH 50: CPT | Performed by: HOSPITALIST

## 2019-07-14 PROCEDURE — 700102 HCHG RX REV CODE 250 W/ 637 OVERRIDE(OP): Performed by: FAMILY MEDICINE

## 2019-07-14 PROCEDURE — 700102 HCHG RX REV CODE 250 W/ 637 OVERRIDE(OP): Performed by: HOSPITALIST

## 2019-07-14 PROCEDURE — 05HY33Z INSERTION OF INFUSION DEVICE INTO UPPER VEIN, PERCUTANEOUS APPROACH: ICD-10-PCS | Performed by: HOSPITALIST

## 2019-07-14 PROCEDURE — 85027 COMPLETE CBC AUTOMATED: CPT

## 2019-07-14 PROCEDURE — A9270 NON-COVERED ITEM OR SERVICE: HCPCS | Performed by: FAMILY MEDICINE

## 2019-07-14 PROCEDURE — 80048 BASIC METABOLIC PNL TOTAL CA: CPT

## 2019-07-14 PROCEDURE — A9585 GADOBUTROL INJECTION: HCPCS | Performed by: HOSPITALIST

## 2019-07-14 PROCEDURE — A9270 NON-COVERED ITEM OR SERVICE: HCPCS | Performed by: HOSPITALIST

## 2019-07-14 PROCEDURE — 700101 HCHG RX REV CODE 250: Performed by: FAMILY MEDICINE

## 2019-07-14 PROCEDURE — A9270 NON-COVERED ITEM OR SERVICE: HCPCS | Performed by: INTERNAL MEDICINE

## 2019-07-14 PROCEDURE — 36573 INSJ PICC RS&I 5 YR+: CPT

## 2019-07-14 PROCEDURE — 700111 HCHG RX REV CODE 636 W/ 250 OVERRIDE (IP): Performed by: HOSPITALIST

## 2019-07-14 PROCEDURE — 83735 ASSAY OF MAGNESIUM: CPT

## 2019-07-14 PROCEDURE — 92526 ORAL FUNCTION THERAPY: CPT

## 2019-07-14 PROCEDURE — 36415 COLL VENOUS BLD VENIPUNCTURE: CPT

## 2019-07-14 PROCEDURE — 99233 SBSQ HOSP IP/OBS HIGH 50: CPT | Performed by: INTERNAL MEDICINE

## 2019-07-14 PROCEDURE — 70553 MRI BRAIN STEM W/O & W/DYE: CPT

## 2019-07-14 PROCEDURE — 700105 HCHG RX REV CODE 258: Performed by: FAMILY MEDICINE

## 2019-07-14 PROCEDURE — 770006 HCHG ROOM/CARE - MED/SURG/GYN SEMI*

## 2019-07-14 PROCEDURE — 700111 HCHG RX REV CODE 636 W/ 250 OVERRIDE (IP): Mod: JG | Performed by: FAMILY MEDICINE

## 2019-07-14 PROCEDURE — 700117 HCHG RX CONTRAST REV CODE 255: Performed by: HOSPITALIST

## 2019-07-14 RX ORDER — PRAMIPEXOLE DIHYDROCHLORIDE 0.25 MG/1
0.25 TABLET ORAL 3 TIMES DAILY
Status: DISCONTINUED | OUTPATIENT
Start: 2019-07-14 | End: 2019-08-10 | Stop reason: HOSPADM

## 2019-07-14 RX ORDER — GADOBUTROL 604.72 MG/ML
7.5 INJECTION INTRAVENOUS ONCE
Status: COMPLETED | OUTPATIENT
Start: 2019-07-14 | End: 2019-07-14

## 2019-07-14 RX ORDER — MAGNESIUM SULFATE HEPTAHYDRATE 40 MG/ML
2 INJECTION, SOLUTION INTRAVENOUS ONCE
Status: COMPLETED | OUTPATIENT
Start: 2019-07-14 | End: 2019-07-14

## 2019-07-14 RX ADMIN — SUCRALFATE 1 G: 1 SUSPENSION ORAL at 00:51

## 2019-07-14 RX ADMIN — AMPHOTERICIN B 285.5 MG: 50 INJECTION, POWDER, LYOPHILIZED, FOR SOLUTION INTRAVENOUS at 16:09

## 2019-07-14 RX ADMIN — PYRIDOXINE HCL TAB 50 MG 100 MG: 50 TAB at 04:34

## 2019-07-14 RX ADMIN — MAGNESIUM 64 MG (MAGNESIUM CHLORIDE) TABLET,DELAYED RELEASE 128 MG: at 17:45

## 2019-07-14 RX ADMIN — HEPARIN SODIUM 5000 UNITS: 5000 INJECTION, SOLUTION INTRAVENOUS; SUBCUTANEOUS at 04:38

## 2019-07-14 RX ADMIN — SODIUM CHLORIDE 500 ML: 9 INJECTION, SOLUTION INTRAVENOUS at 15:34

## 2019-07-14 RX ADMIN — ATORVASTATIN CALCIUM 10 MG: 10 TABLET, FILM COATED ORAL at 17:44

## 2019-07-14 RX ADMIN — ISONIAZID 300 MG: 300 TABLET ORAL at 04:37

## 2019-07-14 RX ADMIN — LIDOCAINE 2 PATCH: 50 PATCH CUTANEOUS at 09:48

## 2019-07-14 RX ADMIN — GABAPENTIN 400 MG: 400 CAPSULE ORAL at 04:38

## 2019-07-14 RX ADMIN — ACETAMINOPHEN 650 MG: 325 TABLET, FILM COATED ORAL at 15:29

## 2019-07-14 RX ADMIN — LEVETIRACETAM 500 MG: 500 TABLET, FILM COATED ORAL at 04:34

## 2019-07-14 RX ADMIN — SUCRALFATE 1 G: 1 SUSPENSION ORAL at 04:40

## 2019-07-14 RX ADMIN — POTASSIUM CHLORIDE 40 MEQ: 20 TABLET, EXTENDED RELEASE ORAL at 04:34

## 2019-07-14 RX ADMIN — ACETAMINOPHEN 1000 MG: 500 TABLET ORAL at 17:44

## 2019-07-14 RX ADMIN — PRAMIPEXOLE DIHYDROCHLORIDE 0.25 MG: 0.5 TABLET ORAL at 17:45

## 2019-07-14 RX ADMIN — RIFAMPIN 300 MG: 300 CAPSULE ORAL at 19:50

## 2019-07-14 RX ADMIN — DIPHENHYDRAMINE HCL 25 MG: 25 TABLET ORAL at 15:29

## 2019-07-14 RX ADMIN — GADOBUTROL 7.5 ML: 604.72 INJECTION INTRAVENOUS at 14:16

## 2019-07-14 RX ADMIN — DEXTROSE MONOHYDRATE 30 ML: 50 INJECTION, SOLUTION INTRAVENOUS at 18:20

## 2019-07-14 RX ADMIN — OXYCODONE HYDROCHLORIDE 10 MG: 5 TABLET ORAL at 00:29

## 2019-07-14 RX ADMIN — ACETAMINOPHEN 1000 MG: 500 TABLET ORAL at 04:34

## 2019-07-14 RX ADMIN — OXYCODONE HYDROCHLORIDE 10 MG: 5 TABLET ORAL at 09:51

## 2019-07-14 RX ADMIN — SUCRALFATE 1 G: 1 SUSPENSION ORAL at 12:22

## 2019-07-14 RX ADMIN — OXYCODONE HYDROCHLORIDE 20 MG: 20 TABLET, FILM COATED, EXTENDED RELEASE ORAL at 04:37

## 2019-07-14 RX ADMIN — SUCRALFATE 1 G: 1 SUSPENSION ORAL at 17:45

## 2019-07-14 RX ADMIN — OXYCODONE HYDROCHLORIDE 20 MG: 20 TABLET, FILM COATED, EXTENDED RELEASE ORAL at 17:45

## 2019-07-14 RX ADMIN — PRAMIPEXOLE DIHYDROCHLORIDE 0.5 MG: 0.5 TABLET ORAL at 04:34

## 2019-07-14 RX ADMIN — METOPROLOL TARTRATE 25 MG: 25 TABLET, FILM COATED ORAL at 04:40

## 2019-07-14 RX ADMIN — OXYCODONE HYDROCHLORIDE 10 MG: 5 TABLET ORAL at 15:29

## 2019-07-14 RX ADMIN — PRAMIPEXOLE DIHYDROCHLORIDE 0.25 MG: 0.5 TABLET ORAL at 12:22

## 2019-07-14 RX ADMIN — SODIUM CHLORIDE 250 ML: 9 INJECTION, SOLUTION INTRAVENOUS at 19:55

## 2019-07-14 RX ADMIN — PYRAZINAMIDE 1000 MG: 500 TABLET ORAL at 04:39

## 2019-07-14 RX ADMIN — SENNOSIDES, DOCUSATE SODIUM 2 TABLET: 50; 8.6 TABLET, FILM COATED ORAL at 17:44

## 2019-07-14 RX ADMIN — MAGNESIUM SULFATE 2 G: 2 INJECTION INTRAVENOUS at 12:22

## 2019-07-14 RX ADMIN — POTASSIUM CHLORIDE 40 MEQ: 20 TABLET, EXTENDED RELEASE ORAL at 17:45

## 2019-07-14 RX ADMIN — OMEPRAZOLE 20 MG: 20 CAPSULE, DELAYED RELEASE ORAL at 04:37

## 2019-07-14 RX ADMIN — ETHAMBUTOL HYDROCHLORIDE 800 MG: 400 TABLET, FILM COATED ORAL at 04:43

## 2019-07-14 RX ADMIN — MAGNESIUM 64 MG (MAGNESIUM CHLORIDE) TABLET,DELAYED RELEASE 128 MG: at 04:37

## 2019-07-14 RX ADMIN — CYCLOBENZAPRINE 10 MG: 10 TABLET, FILM COATED ORAL at 02:21

## 2019-07-14 RX ADMIN — HEPARIN SODIUM 5000 UNITS: 5000 INJECTION, SOLUTION INTRAVENOUS; SUBCUTANEOUS at 17:45

## 2019-07-14 RX ADMIN — GABAPENTIN 400 MG: 400 CAPSULE ORAL at 17:45

## 2019-07-14 RX ADMIN — DEXTROSE MONOHYDRATE 30 ML: 50 INJECTION, SOLUTION INTRAVENOUS at 16:08

## 2019-07-14 RX ADMIN — DIPHENHYDRAMINE HYDROCHLORIDE, ZINC ACETATE: 2; .1 CREAM TOPICAL at 02:20

## 2019-07-14 RX ADMIN — CINACALCET HYDROCHLORIDE 60 MG: 30 TABLET, COATED ORAL at 04:43

## 2019-07-14 RX ADMIN — LEVETIRACETAM 500 MG: 500 TABLET, FILM COATED ORAL at 17:44

## 2019-07-14 RX ADMIN — METOPROLOL TARTRATE 25 MG: 25 TABLET, FILM COATED ORAL at 17:44

## 2019-07-14 RX ADMIN — FERROUS SULFATE TAB 325 MG (65 MG ELEMENTAL FE) 325 MG: 325 (65 FE) TAB at 09:48

## 2019-07-14 RX ADMIN — CYCLOBENZAPRINE 10 MG: 10 TABLET, FILM COATED ORAL at 19:50

## 2019-07-14 RX ADMIN — RIFAMPIN 300 MG: 300 CAPSULE ORAL at 04:38

## 2019-07-14 RX ADMIN — Medication 1 CAPSULE: at 09:48

## 2019-07-14 RX ADMIN — LOSARTAN POTASSIUM 50 MG: 50 TABLET ORAL at 04:34

## 2019-07-14 RX ADMIN — SENNOSIDES, DOCUSATE SODIUM 2 TABLET: 50; 8.6 TABLET, FILM COATED ORAL at 04:37

## 2019-07-14 RX ADMIN — AMLODIPINE BESYLATE 10 MG: 5 TABLET ORAL at 04:37

## 2019-07-14 RX ADMIN — GABAPENTIN 400 MG: 400 CAPSULE ORAL at 12:28

## 2019-07-14 ASSESSMENT — ENCOUNTER SYMPTOMS
SENSORY CHANGE: 0
TREMORS: 1
COUGH: 0
MYALGIAS: 1
BLURRED VISION: 0
CHILLS: 0
NERVOUS/ANXIOUS: 1
NAUSEA: 0
HEARTBURN: 0
VOMITING: 0
TINGLING: 0
FEVER: 0
ABDOMINAL PAIN: 0

## 2019-07-14 NOTE — PROGRESS NOTES
Hospital Medicine Daily Progress Note    Date of Service  7/14/2019    Chief Complaint    68 y.o. female admitted 5/29/2019 with pelvic pain and confusion .     Hospital Course    68 y.o. female transferred from [facility] on 5/29/2019 with persistent right gluteal abscesses since sacral fracture repair in December with multiple IR drainage.     Interval imaging to date:  MRI brain showed increase in brain lesions  CHAS done by Dr. Potter showed no vegetations.  MRI brain on 6/7/19 saw progression of the supra and infratentorial intra-axial nodules with edema, prompting a Neurosurgery consult.     Repeat CT on 6/17 showed no significant interval change in the size of the right iliacus muscle fluid collections, with changes in the right iliac bone, bilateral sacrum, and left iliac bone suspicious for osteomyelitis, and changes at L5-S1 suspicious for discitis/osteomyelitis, along with hyperdense right gluteal lesion, moderately suspicious for postoperative pseudoaneurysm with adjacent hematoma.      Cultures:  Pelvic abscess drainage on 5/30 with negative results.  Biopsy of the hip showed no AFB, organism, or fungal elements    Patient had re evaluation by Dr. Nguyen and she underwent right-sided craniotomy with resection of right sided superficial mass on 6/28/2019.  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  Patient started on IV Amphotericin.  Path +focal necrotizing granulomatous inflammation with a marked histiocytic infiltrate.    Interval Problem Update  Afebrile, improved mentation today, alert and does not look in pain  Electrolytes improving, Mg still low, will give IV after PICC placed  Had IR guided bx of abscesses with extensive lab workup planned, all those cultures so far neg  Brucellosis negative so far  Previously had negative cultures but +ve Quant so on RIPE.   Increased gabapentin with crosstitration of pramipexole, seems to have worked well    Message sent to Dr Haas (opho) regarding  vision testing request while inpatient and on ethambutol    Neutrophil function NORMAL, r/o chronic granulomatous dz.   D/W Vernon ID about stopping ampho, will order MRI w/wo to reassess lesions  CTA pelvis at some point as well    Consultants/Specialty  Neurosurgery, Dr. Nguyen  Pulmonary Dr. Salinas  Infectious disease  IR    Code Status  DNR/DNI    Disposition  Unclear  High complexity  Remains inpatient  Anticipate SNF when more stable    Review of Systems  Review of Systems   Constitutional: Negative for chills, fever and malaise/fatigue.   Eyes: Negative for blurred vision.   Respiratory: Negative for cough.    Cardiovascular: Negative for chest pain.   Gastrointestinal: Negative for abdominal pain, heartburn, nausea and vomiting.   Musculoskeletal: Positive for myalgias.   Skin: Negative for itching and rash.   Neurological: Positive for tremors. Negative for tingling and sensory change.   Psychiatric/Behavioral: The patient is nervous/anxious.    All other systems reviewed and are negative.    Physical Exam  Temp:  [36.4 °C (97.5 °F)-36.6 °C (97.8 °F)] 36.5 °C (97.7 °F)  Pulse:  [66-95] 80  Resp:  [17-19] 17  BP: (150-153)/(75-91) 153/75  SpO2:  [93 %-98 %] 93 %    Physical Exam   Constitutional: She is oriented to person, place, and time. No distress.   HENT:   Head: Normocephalic and atraumatic.   Mouth/Throat: Oropharynx is clear and moist.   Right cranio site present with staples   Eyes: Conjunctivae and EOM are normal. Right eye exhibits no discharge. Left eye exhibits no discharge.   Neck: Normal range of motion. Neck supple.   Cardiovascular: Normal rate and intact distal pulses.    No murmur heard.  Pulmonary/Chest: No stridor. No respiratory distress. She has no wheezes. She has no rales.   Abdominal: Soft. She exhibits no distension. There is no tenderness. There is no rebound.   Musculoskeletal: She exhibits no edema or tenderness.   Generalized 4/5 strength.  Constant movement LEs at ankles and  knees, improving  Less tremor   Neurological: She is alert and oriented to person, place, and time. Coordination abnormal.   No focal weakness  Consistent RLS type movement LEs  No TTP  More alert today   Skin: Skin is warm and dry. She is not diaphoretic. No pallor.   Brawny induration LEs   Psychiatric:   Anxious but improved   Vitals reviewed.    Fluids    Intake/Output Summary (Last 24 hours) at 07/14/19 1223  Last data filed at 07/14/19 0800   Gross per 24 hour   Intake             2130 ml   Output              450 ml   Net             1680 ml       Laboratory  Recent Labs      07/12/19   0353  07/13/19   0522  07/14/19   0324   WBC  7.7  5.9  5.1   RBC  3.06*  3.44*  3.32*   HEMOGLOBIN  9.0*  9.9*  9.7*   HEMATOCRIT  28.1*  35.4*  31.1*   MCV  91.8  102.9*  93.7   MCH  29.4  28.8  29.2   MCHC  32.0*  28.0*  31.2*   RDW  56.4*  64.1*  55.1*   PLATELETCT  207  203  200   MPV  8.8*  9.3  9.0     Recent Labs      07/12/19   0353  07/13/19 0522 07/14/19   0324   SODIUM  138  137  139   POTASSIUM  4.0  4.5  3.9   CHLORIDE  110  111  109   CO2  22  16*  22   GLUCOSE  82  102*  94   BUN  8  13  6*   CREATININE  0.59  0.71  0.54   CALCIUM  9.6  10.0  9.6                 Assessment/Plan  * Brain lesion- (present on admission)   Assessment & Plan    With right gluteal, multiloculated right iliac muscle fluid collections.     CHAS neg 3/15 and 5/24.    Cultures 3/29 and 4/4 neg  She has completed multiple courses of IV antibiotics  s/p drainage on 5/30/2019, cultures negative  s/p removal hardware  1, 3 beta glucan neg.      CT on 6/8 with residual abscess in the right iliacus muscle, 2.5 x 1.8 cm, slightly smaller than prior  s/p CT-guided deep bone biopsy 6/19/2019.  Cultures remain negative; path not reported     MRI 6/28 chronic discitis, diffuse OM L5, 3 and 4 cm abscesses right glute, 1.5 cm abscess or necrosis right psoterior ileum, 3.3 cm abscess right SI joint.    Status post brain biopsy - 6-28-19   Q gold +    Brain biopsy + positive focal necrotizing granulomas-may suggest mycobacterial infection. ? Fungal infxn, other cause.    Continue IV amphotericin per ID.  Has had electrolyte wasting with recurrent hypomagnesemia.  Continue schedule KCl and magnesium supplements  Monitor electrolytes closely  Continue Keppra for sz prophylaxis.     Patient with recurrent abscesses with progression, osteomyelitis and granulomas-  cultures have been negative  NORMAL neutrophil function test.     Consideration of stoping Ampho soon  Check MRI brain with and without   Follow up cultures, potentially something over weekend    Contacted Dr Haas for vision testing on ethambutol      Granulomatous disease    Assessment & Plan    Cultures negative with recurrent, progressive abscesses, OM.  Unclear cause.  Continue treatment for possible infectious causes as above.      NORMAL Neutrophil function test- dihydrorhodamine 123 screening test.      Abscess of right iliac muscle and right gluteus medius muscle- (present on admission)   Assessment & Plan    Recurrent issue since sacral fracture repair in December 2018. Has had multiple IR drainage and antibiotics. Recent cultures remain unrevealing.    s/p biopsy of the nonhealing right iliac fracture area. Showed no AFB, organism, or fungal elements.  Cultures negative.  Pain control.  Increased OxyContin, still uncontrolled and increased PRN morphine dose    Continue Lidoderm patch, PRN oxycodone, IV morphine for severe breakthrough pain  Continue with  RI PE w B6 and amphotericin per ID.       Sepsis (HCC)   Assessment & Plan    This is sepsis (without associated acute organ dysfunction).    Sepsis resolved  Continue amphotericin , RIPE started.  Monitor vital signs     Hypomagnesemia- (present on admission)   Assessment & Plan    Low again  Give IV 2gm  Monitor electrolytes     Hypercalcemia- (present on admission)   Assessment & Plan    Has resolved  Continue to monitor calcium     RLS  (restless legs syndrome)- (present on admission)   Assessment & Plan    Appears to have augmented   Giving her severe distress, no sleep  Cross titrate with incr dose gabapentin, start lowering pramipexole dose  Working well     Acquired circulating anticoagulants (HCC)- (present on admission)   Assessment & Plan    Anticoagulation on hold given recent brain biopsy.  Discussed with Dr. Nguyen- Ok to start AC per surgery .  US legs negative for DVT  Start DVT pplx     Non-severe protein-calorie malnutrition (HCC)- (present on admission)   Assessment & Plan    Advance diet as tolerated  Dietary supplements       History of DVT (deep vein thrombosis)- (present on admission)   Assessment & Plan    Hold anticoagulation given recent brain biopsy and lumbar osteomyelitis.  LE duplex negative     Essential hypertension- (present on admission)   Assessment & Plan    Continue metoprolol losartan and amlodipine  Monitor blood pressure     Chronic hyponatremia- (present on admission)   Assessment & Plan    Recurrent  Continue to monitor Na.     Chronic pain- (present on admission)   Assessment & Plan    Continue pain management       History of breast cancer- (present on admission)   Assessment & Plan    S/p left mastectomy and breast implant.  Follow-up on intraoperative final pathology results     COPD (chronic obstructive pulmonary disease) (HCC)- (present on admission)   Assessment & Plan    Stable   RT protocol and oxygen        Discussed with multi disciplinary team plan of care.     VTE prophylaxis: SCDs, start heparin sq for now, consider brooks

## 2019-07-14 NOTE — PROGRESS NOTES
2 RN skin check completed with Joanna NGUYEN.     Devices in place: PIV x 1, and Pt refused SCDs.      Areas on concern:   Right buttocks biopsy site, CDI.   Sacrum pink and blanching.   BLE dusky and dry, heels pink and blanching, bruising noted.   Right head incision well approximated with staples, SHAZIA.      Preventative measures in place:   Q 2 turns with pillows. Pt able to reposition self .   Barrier wipes and cream.

## 2019-07-14 NOTE — PROCEDURES
Vascular Access Team     Date of Insertion: 7/14/19  Arm Circumference: 24  Internal length: 40  External Length: 3  Vein Occupancy %: 37  Reason for PICC: lack of access, possible long term abx  Labs: WBC 5.1, , BUN 6, Cr 0.54, GFR >60, INR n/a    Consents confirmed, vessel patency confirmed with ultrasound. Risks and benefits of procedure explained to patient and education regarding central line associated bloodstream infections provided. Questions answered.     PICC placed in RUE per MD order with ultrasound guidance, initial arm circumference 24cm. 4 Fr, 1 lumen PICC placed in brachial vein after 1 attempt(s). 2 cc's of 1% lidocaine injected intradermally, 21 gauge microintroducer needle and modified Seldinger technique used. 40 cm catheter inserted with good blood return. Secured at 3 cm marker. Each lumen flushed without resistance with 10 mL 0.9% normal saline. PICC line secured with Biopatch and Tegaderm.     PICC placement is confirmed by nurse using 3CG technology. PICC line is appropriate for use at this time. Patient tolerated procedure well, without complications.  Patient condition relayed to unit RN or ordering physician via this post procedure note in the EMR.     Ultrasound images uploaded to PACS and viewable in the EMR - yes  Ultrasound imaged printed and placed in paper chart - no     BARD Power PICC ref # [id number], Lot # [id number]

## 2019-07-14 NOTE — PROGRESS NOTES
Pt alert x 3-4. Confused at times. Pt speaking both english/Georgian. Pt able to void on bedpan. C/o of pain. meds provided.

## 2019-07-14 NOTE — CARE PLAN
Problem: Safety  Goal: Will remain free from injury  Outcome: PROGRESSING AS EXPECTED  Pt. Educated on fall protocol rational .     Problem: Knowledge Deficit  Goal: Knowledge of disease process/condition, treatment plan, diagnostic tests, and medications will improve  Outcome: PROGRESSING AS EXPECTED  Pt. Updated on PICC placement and possible MRI plans for today and informed of any changes.

## 2019-07-14 NOTE — CARE PLAN
Problem: Safety  Goal: Will remain free from injury  Outcome: PROGRESSING AS EXPECTED  Bed alarm on. Call light within reach.     Problem: Pain Management  Goal: Pain level will decrease to patient's comfort goal  Outcome: PROGRESSING AS EXPECTED  Pain addressed. Pain meds provided.

## 2019-07-14 NOTE — THERAPY
"Speech Language Therapy dysphagia treatment completed.   Functional Status:  Pt seen on this date for a swallow reassessment per MD request. Pt A&Ox3 with disorientation to year and agreeable to therapy. Pt intermittently cringing with hip pain and RN gave pt medications crushed in puree with no s/sx of aspiration. PO trials of purees, soft solids, mixed consistencies, solids, and thin liquids were presented to the pt and throat clear x1 noted with mixed consistencies, however, no other overt s/sx of aspiration noted. Upon palpation, laryngeal elevation was complete and swallow trigger timely. Pt reporting she only has 8 teeth so mastication was slightly prolonged however appears functional. At this time, would recommend upgrade to dysphagia III/thin liquids with direct supervision during meal times and assistance with tray set up. Dysphagia education and recommendations provided to the pt and she verbalized understanding. SLP to follow for dysphagia therapy.     Recommendations: upgrade to dysphagia III/thins liquids with direct supervision during meals and assistance with tray set up   Plan of Care: Will benefit from Speech Therapy 3 times per week  Post-Acute Therapy: Recommend inpatient transitional care services for continued speech therapy services.        See \"Rehab Therapy-Acute\" Patient Summary Report for complete documentation.     "

## 2019-07-14 NOTE — PROGRESS NOTES
Infectious Disease Progress Note    Author: Geno Junior M.D. Date & Time of service: 7/14/2019  7:54 AM    Chief Complaint:  Brain abscess, gluteal abscess  Vertebral OM     Interval History:  68 y.o. female admitted 5/29/2019 as a transfer from [facility] since 4/30/2019. + diabetes, SANTOSH of right hip with hardware, and breast cancer who was originally admitted to [facility] on 03/08/2019 for right hip and pelvic pain.  Work-up revealed a right iliopsoas lesion, gluteal abscess, and mult brain abscesses     6/24/2019 no fevers.  The hip pain is better.  No cough no shortness of breath  6/25/2019-no fevers.  The hip cultures remain negative.  She is scheduled for stereotactic biopsy on Friday.  6/26/2019 no fevers are noted.  Patient complains of back pain.  In significant distress.  6/27/2019-no fevers.  Continues to complain of pain.  No new issues overnight  6/28 AF WBC 6.6 planned for MRIs and biopsy today-somnolent  6/29 AF WBC 7.7 obtunded prelim path showed fungus on brain specimen  6/30 AF somnolent-not awakening to voice-slurred speech noted by Neurosurgery  7/1 AF more awake today; not oriented-ate about half of lunch c/o pain in joints  7/2 AF WBC 5.5 no new complaints  7/3 AF still c/o unrelenting right hip pain whenever awake-Falls asleep during exam  7/4 AF much more alert and conversant today-c/o left hip pain, unrelenting and would like parietal dressing changed. HA at surgical site.  Denies SE abx. No new neurodeficits  7/5 afebrile WBC 5.4.  Patient sleeping but arousable.  She denies any headache, nausea, vomiting.  7/6 afebrile WBC 6.5.  Patient sitting up in chair and having excruciating back pain  7/8 Pt has no specific complaints, she is mildly confused today.  She seems to be tolerating her medications with no significant lab abnormalities.  7/9 AF, O2 RA, Significant left hip pain today.  She does appear to be tolerating her antibiotics.  Ultrasound of bilateral lower extremities has  been ordered.  7/10 AF, O2 RA,  Plan for IR drainage of sacral collection soon.    AF, O2 RA,  Pt continued on RIPE and ampho.  She is tolerating well overall, requiring some mag and potassium replacement.  She is complaining of severe pain in left hip again today.  Plan for biopsy later today.     IR biopsy of R gluteal abscess/hip. AF, O2 2 L NC,   tolerating antibiotics so far.  She is quite somnolent today but per nursing she was oriented x4 earlier   AF, O2 RA, more alert today, conversant and asking for advance in diet. Left hip pain ongoing but improved.     Review of Systems:  ROS    Hemodynamics:  Temp (24hrs), Av.5 °C (97.7 °F), Min:36.4 °C (97.5 °F), Max:36.6 °C (97.8 °F)  Temperature: 36.4 °C (97.5 °F)  Pulse  Av.4  Min: 49  Max: 195   Blood Pressure : 151/91       Physical Exam:  Physical Exam    Meds:    Current Facility-Administered Medications:   •  senna-docusate  •  polyethylene glycol/lytes  •  magnesium hydroxide  •  gabapentin  •  pramipexole  •  ferrous sulfate  •  diphenhydrAMINE  •  morphine injection  •  heparin  •  oxyCODONE CR  •  potassium chloride SA  •  magnesium chloride  •  pyridoxine  •  levETIRAcetam  •  isoniazid  •  riFAMPin  •  ethambutol  •  pyrazinamide  •  acetaminophen  •  Pharmacy Consult Request  •  labetalol  •  hydrALAZINE  •  NS **AND** acetaminophen **AND** diphenhydrAMINE **AND** D5W **AND** amphotericin b liposomal (AMBISOME) IV **AND** D5W **AND** NS  •  NS  •  lactobacillus rhamnosus  •  oxyCODONE immediate-release  •  sucralfate  •  acetaminophen  •  lidocaine  •  temazepam  •  cyclobenzaprine  •  Respiratory Care per Protocol  •  amLODIPine  •  cinacalcet  •  losartan  •  atorvastatin  •  omeprazole  •  metoprolol  •  ondansetron  •  ondansetron    Labs:  Recent Labs      19   0353  19   0522  19   0324   WBC  7.7  5.9  5.1   RBC  3.06*  3.44*  3.32*   HEMOGLOBIN  9.0*  9.9*  9.7*   HEMATOCRIT  28.1*  35.4*  31.1*   MCV  91.8   102.9*  93.7   MCH  29.4  28.8  29.2   RDW  56.4*  64.1*  55.1*   PLATELETCT  207  203  200   MPV  8.8*  9.3  9.0     Recent Labs      07/12/19   0353  07/13/19   0522  07/14/19   0324   SODIUM  138  137  139   POTASSIUM  4.0  4.5  3.9   CHLORIDE  110  111  109   CO2  22  16*  22   GLUCOSE  82  102*  94   BUN  8  13  6*     Recent Labs      07/12/19   0353  07/13/19 0522  07/14/19   0324   CREATININE  0.59  0.71  0.54       Imaging:  Ct-needle Bx-deep Bone    Result Date: 6/20/2019 6/19/2019 9:12 AM HISTORY/REASON FOR EXAM:  Concern for right hip osteomyelitis. TECHNIQUE/EXAM DESCRIPTION: Right hip deep bone biopsy with CT guidance. Low dose optimization technique was utilized for this CT exam including automated exposure control and adjustment of the mA and/or kV according to patient size. PROCEDURE: Informed consent was obtained. Conscious sedation with 25 mcg Fentanyl and 1 mg Versed was administered during the procedure with appropriate continuous patient monitoring by the radiology nurse. Sedation duration: 30 minutes. Localizing CT images were obtained with the patient in prone position. The skin was prepped with ChloraPrep and draped in a sterile fashion. Following local anesthesia with 1% Lidocaine, a 13 G guiding needle was placed and needle position confirmed with CT. Using coaxial technique, an 14 G core biopsy needle was placed into the target lesion in the right posterior ilium and needle position confirmed with CT. A total of 2 samples were obtained in this fashion and submitted in saline for microbiology. The guiding needle was removed and limited CT images obtained through the biopsy site show no evidence of significant parenchymal hemorrhage. The patient tolerated the procedure well. COMPARISON: None available. COMPLICATIONS: No immediate complications. FINDINGS: The localizing CT images show satisfactory needle position within the target location.     CT GUIDED RIGHT ILIUM DEEP BONE BIOPSY.  SAMPLES WERE SENT TO MICROBIOLOGY FOR ANALYSIS.    Ct-pelvis With    Result Date: 6/17/2019 6/17/2019 7:23 AM HISTORY/REASON FOR EXAM:  Pelvic abscess TECHNIQUE/EXAM DESCRIPTION:  CT pelvis without IV contrast.   Sequential axial CT images were obtained from the lower lumbar spine to the proximal femurs following the administration of 100 cc Omnipaque 350 intravenous contrast. Low dose optimization technique was utilized for this CT exam including automated exposure control and adjustment of the mA and/or kV according to patient size. COMPARISON:  None FINDINGS: The visualized portions of the small bowel and colon appear within normal limits. The bladder is within normal limits. Inferior RIGHT iliacus fluid collection now measures 1.8 x 2.1 cm, previously 1.8 x 2.5 cm. More cephalad RIGHT iliacus fluid collection now measures 1.2 x 2.1 cm, previously 2 x 1.5 cm. Unhealed fractures with irregular margins or reflux demonstrated in the RIGHT iliac bone and the sacrum with a screw track extending into the LEFT iliac bone. There are areas of lucency irregularity in the LEFT iliac bone. There is anterolisthesis of L5 on S1 with irregular lucencies adjacent to the narrowed disc space. There is a 2 x 1.2 cm hyperdense area in the RIGHT gluteal musculature on axial image 14 of series 2. There is overlying soft tissue edema. There are mildly prominent BILATERAL inguinal lymph nodes.     1.  No significant interval change in the size of the RIGHT iliac muscle fluid collections 2.  Hyperdense RIGHT gluteal lesion moderately suspicious for pseudoaneurysm with adjacent hematoma. 3.  Changes in the RIGHT iliac bone, BILATERAL sacrum and LEFT iliac bone suspicious for osteomyelitis 4.  Changes at L5-S1 suspicious for discitis osteomyelitis    [id number] View    Result Date: 7/12/2019 7/12/2019 12:49 AM HISTORY/REASON FOR EXAM: Abdominal Pain TECHNIQUE/EXAM DESCRIPTION:  Single AP view the abdomen. COMPARISON:  None FINDINGS:  Limited views of the lung bases are clear. Moderate stool is seen throughout the colon, otherwise bowel gas pattern is nonspecific. Levoscoliosis and degenerative changes of the lumbar spine are seen. Atherosclerotic calcifications are noted.     1.  Moderate stool in the colon suggests changes of constipation, otherwise nonspecific bowel gas pattern 2.  Atherosclerosis    Ir-us Guided Piv    Result Date: 7/8/2019  EXAMINATION:                                                                    HISTORY/REASON FOR EXAM:  Ultrasound Guided PIV.  TECHNIQUE/EXAM DESCRIPTION AND NUMBER OF VIEWS:  Peripheral IV insertion with ultrasound guidance.  The procedure was prepared using maximal sterile barrier technique including sterile gown, mask, cap, and donning of sterile gloves following appropriate hand hygiene and/or sterile scrub. Patient skin site was prepped with 2% Chlorhexidine solution.   FINDINGS: Peripheral IV insertion with Ultrasound Guidance was performed by qualified imaging nursing staff without the assistance of a Radiologist.      Ultrasound-guided PERIPHERAL IV INSERTION performed by qualified nursing staff as above.     Mr-brain-with & W/o    Result Date: 6/22/2019 6/22/2019 10:48 AM HISTORY/REASON FOR EXAM:  Headache, new, meningitis or encephalitis suspected. TECHNIQUE/EXAM DESCRIPTION: MRI of the brain without and with contrast, with diffusion. The study was performed on a [employer] Signa 1.5 Miranda MRI scanner. Spoiled-GRASS sagittal, thin-section T2 axial, T1 coronal, T1 postcontrast coronal, T1 postcontrast axial, FLAIR coronal and diffusion-weighted axial images were obtained of the whole brain. 6 mL Gadavist contrast were administered intravenously. COMPARISON:  6/8/2019. FINDINGS: There is a pattern of mild cerebral atrophy manifest as prominence of sulcal markings over the convexities and vertex along with mild ventriculomegaly. The bony calvaria are intact. There is no evidence of extra-axial fluid  collection or intracranial mass effect. Innumerable supra and infratentorial enhancing nodules are again noted. A lesion in the right thalamus appears somewhat more prominent when compared to the previous study measuring approximately 8 mm, previously approximately 5 mm. A lesion in the right frontal operculum gray-white junction demonstrates increased enhancement but overall unchanged size. A lesion in the right posterior hippocampus appears more somewhat larger and with increased enhancement. Enhancement. Appear to demonstrate associated diffusion restriction. There is an underlying pattern of mild supratentorial white matter disease with scattered foci of bright T2 and FLAIR signal in the subcortical and deep white matter of both hemispheres consistent with small vessel ischemic change versus demyelination or gliosis. There are normal flow voids in the cavernous carotid arteries and M1 segments. There are normal flow voids in the distal vertebral basilar system. There are normal flow voids in the dural venous sinuses. The visualized paranasal sinuses and mastoid air cells appear clear.     1.  Innumerable supra and infratentorial enhancing nodules are again noted throughout the brain parenchyma with multiple lesions appearing somewhat larger and with increased enhancement. No associated diffusion restriction. Unchanged differential considerations including bacterial or fungal infection versus metastatic disease. 2.  Mild diffuse cerebral substance loss. 3.  Mild microangiopathic ischemic change versus demyelination or gliosis.    Mr-lumbar Spine-with & W/o    Result Date: 6/28/2019 6/28/2019 9:56 AM HISTORY/REASON FOR EXAM:  Back pain or radiculopathy, cancer or infection suspected. TECHNIQUE/EXAM DESCRIPTION: MRI of the lumbar spine without and with contrast. The study was performed on a 404 [employer]a 1.5 Miranda MRI scanner. T1 sagittal, T2 fast spin-echo sagittal, and T2 axial images were obtained of the lumbar  spine. T1 post-contrast fat-suppressed sagittal images were obtained. Optional T2 fat-suppressed sagittal and T1 post-contrast axial images may be obtained. 6 mL Gadavist contrast was administered intravenously. COMPARISON:  MRI scan of the lumbar spine 3/26/2019 FINDINGS: There is a grade 2-3 spondylolisthesis at the L5-S1 level with bilateral spondylolysis of pars interarticularis. There is been interval removal of the posterior fixation hardware since previous exam. There is diffuse decreased T1 signal intensity throughout the majority of the L5 vertebral body and the first and second sacral segments. Further there is heterogeneous increased T2 signal intensity and enhancement in these regions. There is also evidence for enhancing soft tissue anterior to the L5 vertebral body and first through third sacral segments. There are elliptical nonenhancing areas in this region of abnormal enhancing soft tissue anterior to the L5-S1 disc space and first sacral segment. Additionally there are ovoid nonenhancing areas in  the first and third sacral segments. There is a 4 x 2.1 cm somewhat ovoid fluid signal intensity collection lateral to the right side of the iliac bone involving the right gluteal musculature. Additionally there is a screw tract coursing transversely through the iliac bones and sacrum which  has heterogeneous enhancement. There is enhancing soft tissue signal intensity extending from the screw tract in the right ilium into the right-sided gluteal soft tissues. There is also an ovoid area of decreased T1 and increased T2 signal intensity in the right posterior ilium. There is a 3.3 cm multiloculated fluid signal intensity collection involving the right sacroiliac joint. The conus is normal in position and signal. There is no abnormal cord enhancement. There is mild to moderate disc space narrowing at the L5-S1 level which has evidence of increased T2 signal intensity and enhancement. At T12-L1 through L4-5  disc height and signal is normal. Level specific findings: L5-S1 level minimal annular bulging. Severe bilateral neural foraminal stenosis secondary to the spondylolisthesis. L4-5 level minimal posterior spurring and annular bulging. L3-4 level minimal annular bulging. L2-3 level minimal annular bulging. L1-2 level minimal annular bulging.     1.  Grade 2-3 spondylolisthesis at the L5-S1 level with bilateral spondylolysis of the pars interarticularis. This causes severe bilateral neural foraminal stenosis at this level. 2.  Interval removal of posterior fusion hardware at the lumbosacral junction. 3.  Diffuse osteomyelitis involving the L5 vertebral body and the first 3 sacral segments. 4.  Chronic discitis at the L5-S1 level with anterior paraspinous abscess at this level and anterior to the S1 sacral segment. 5.  Smaller intraosseous bone bone abscesses or areas of necrosis noted in the sacrum. 6.  There is also evidence of osteomyelitis involving the jose antonio and sacrum bilaterally along the course of the previous sacral fixation screw. There is a large 4 cm abscess noted in the right gluteal soft tissues in a smaller 3 cm chronic appearing abscess in the left gluteal musculature. 7.  There is a 1.5 cm intraosseous abscess or area of necrosis in the right posterior ilium. 8.  There is a 3.3 cm centimeters multiloculated abscess anterior to the right sacroiliac joint.    Us-extremity Venous Lower Bilat    Result Date: 7/9/2019   Vascular Laboratory  CONCLUSIONS  Normal bilateral superficial and deep venous examination of the lower  extremities.  MARLENA CLIFFORD  Exam Date:     07/09/2019 11:10  Room #:     Inpatient  Priority:     Routine  Ht (in):             Wt (lb):  Ordering Physician:        APRIL MERA  Referring Physician:       [id number] Dawson  Sonographer:               Sumi Sales RVT  Study Type:                Complete Bilateral  Technical Quality:         Adequate  Age:    68    Gender:     F  MRN:     [id number]  :    1950      BSA:  Indications:     Personal history of venous thrombosis and embolism  CPT Codes:       82831  ICD Codes:       Z86.71  History:         Per physician order \"history of reported DVT\", patient is                   poor historian. No previous duplex on file. DVT.  Limitations:  PROCEDURES:  Bilateral lower extremity venous duplex imaging.  The following venous structures were evaluated: common femoral, profunda  femoral, greater saphenous, femoral, popliteal , peroneal and posterior  tibial veins.  Serial compression, augmentation maneuvers,  color and spectral Doppler  flow evaluations were performed.  FINDINGS:  Bilateral lower extremities -  Complete color filling and compressibility with normal venous flow dynamics  including spontaneous flow, response to augmentation maneuvers, and  respiratory phasicity.  The peroneal and posterior tibial veins are difficult to assess for  compressibility, but flow response to augmentation is demonstrated.  Donovan Finley  (Electronically Signed)  Final Date:      2019                   12:51    Ct-needle Bx-abd-retroperitonl    Result Date: 2019 5:25 PM HISTORY/REASON FOR EXAM: Pelvic osteomyelitis. TECHNIQUE/EXAM DESCRIPTION: Pelvic abscess/phlegmon biopsy with CT guidance. Low dose optimization technique was utilized for this CT exam including automated exposure control and adjustment of the mA and/or kV according to patient size. PROCEDURE: Informed consent was obtained. Conscious sedation with 100 mcg Fentanyl and 2 mg Versed was administered during the procedure with appropriate continuous patient monitoring by the radiology nurse. Sedation duration: 30 minutes Localizing CT images were obtained with the patient in prone position. Of note, in the left gluteal region there is a soft tissue attenuation region immediately posterior to the right ilium which corresponds to the previously demonstrated location of " the  suspected pseudoaneurysm on the contrast-enhanced study dated 5/28/2019. This appeared significantly increased in size. On palpation, this was in fact pulsatile and likely consistent with significant interval enlargement of the previously demonstrated left gluteal pseudoaneurysm. This measured approximately 3.3 cm. The skin was prepped with ChloraPrep and draped in a sterile fashion. Following local anesthesia with 1% Lidocaine, a 17 G guiding needle was placed and needle position confirmed with CT. Using coaxial technique, an 18 G core biopsy needle was placed into the target location and needle position confirmed with CT. A total of 3 samples were obtained in this fashion and submitted in formalin. 3 additional core specimens were obtained and placed in saline for microbiology. The guiding needle was removed and limited CT images obtained through the biopsy site show no evidence of significant hemorrhage. The patient tolerated the procedure well. COMPARISON: None available. FINDINGS: The localizing CT images show satisfactory needle position within the target lesion.  Interval increase in right gluteal pseudoaneurysm.     1.  CT GUIDED biopsy of a right pelvic phlegmon. 2. Significant interval enlargement of a right gluteal pseudoaneurysm. CTA and consideration for possible intervention either with direct thrombin injection or endovascular treatment was suggested. This was conveyed to Dr. Alatorre on 7/11/2019 via [facility] Line text at 1750 hours.    Us-extremity Non Vascular Unilateral Right    Result Date: 6/22/2019 6/22/2019 2:45 PM HISTORY/REASON FOR EXAM:  hx of right hip hardware removal 6/15; swelling Evaluate right hip for fluid collection. TECHNIQUE/EXAM DESCRIPTION AND NUMBER OF VIEWS: Limited ultrasound of the right hip COMPARISON: 6/17/2019 FINDINGS: No right hip joint effusion. No soft tissue fluid collection.     No right hip joint effusion. No soft tissue fluid collection.    Thalchemy-stealth Brain With &  W/o    Result Date: 6/28/2019 6/28/2019 9:57 AM HISTORY/REASON FOR EXAM:  Brain metastasis follow-up TECHNIQUE/EXAM DESCRIPTION AND NUMBER OF VIEWS: MRI of the brain without and with contrast, Stealth protocol. Fiducial markers for the Stealth stereotactic system were placed on the scalp. Thin-section 2 mm T2 fast spin-echo true axial images were obtained of the whole brain. Thin-section 1.5 mm T1-weighted postcontrast axial 3D BRAVO images were obtained of the whole brain. 3D reconstructions in the Coronal and Sagittal planes were generated from the axial 3D BRAVO postcontrast sequence. The study was performed on a [employer] Signa 1.5 Miranda MRI scanner. 6 mL Gadavist contrast was administered intravenously. COMPARISON:  MRI scans brain 3/7/2019 FINDINGS: There are innumerable varying size nodular enhancing lesions throughout the brain parenchyma. Many of these lesions are at the gray-white junction but there are multiple lesions arising in the region of the basal ganglia. The largest lesion is in the right thalamus and measures 9 mm in greatest diameter. There is a moderate amount of surrounding increased T2 signal intensity in the right thalamus. This lesion has increased in size since previous exam. The majority of the lesions have not changed significantly since previous exam. The calvariae are unremarkable. There are no extra-axial fluid collections. The ventricular system and basal cisterns are mild to moderately prominent. There is mild-to-moderate prominence of cortical sulci and gyri. There are no mass effects or shift of  midline structures. There are no hemorrhagic lesions. Vascular flow voids in the vertebrobasilar and carotid arteries, [place], and dural venous sinuses are intact. The paranasal sinuses and mastoids in the field of view are unremarkable.     1.  Innumerable subcentimeter brain metastases, many at the gray-white junction but also multiple noted throughout the basal ganglia and  brainstem. 2.  Largest index lesion is noted in the right thalamus and has increased in size since previous exam and currently measures 9 mm and has a moderate amount of surrounding vasogenic edema.      Micro:  Results     Procedure Component Value Units Date/Time    CULTURE TISSUE W/ GRM STAIN [[id number]] Collected:  07/11/19 1745    Order Status:  Completed Specimen:  Tissue Updated:  07/14/19 0752     Significant Indicator NEG     Source TISS     Site Right Hip Cores     Culture Result No growth at 48 hours.     Gram Stain Result No organisms seen.    Narrative:       Radiology specimen Hold specimen 14 days per Dr. Junior    Anaerobic Culture [[id number]] Collected:  07/11/19 1745    Order Status:  Completed Specimen:  Tissue Updated:  07/14/19 0752     Significant Indicator NEG     Source TISS     Site Right Hip Cores     Culture Result Culture in progress.    Narrative:       Radiology specimen Hold specimen 14 days per Dr. Junior    GRAM STAIN [[id number]] Collected:  07/11/19 1745    Order Status:  Completed Specimen:  Tissue Updated:  07/12/19 2241     Significant Indicator .     Source TISS     Site Right Hip Cores     Gram Stain Result No organisms seen.    Narrative:       Radiology specimen Hold specimen 14 days per Dr. Junior    Acid Fast Stain [[id number]] Collected:  07/11/19 1745    Order Status:  Completed Specimen:  Tissue Updated:  07/12/19 2226     Significant Indicator NEG     Source TISS     Site Right Hip Cores     AFB Smear Results No acid fast bacilli seen.    Fungal Culture [[id number]] Collected:  07/11/19 1745    Order Status:  Completed Specimen:  Tissue Updated:  07/12/19 2226     Significant Indicator NEG     Source TISS     Site Right Hip Cores     Culture Result Culture in progress.    AFB Culture [[id number]] Collected:  07/11/19 1745    Order Status:  Completed Specimen:  Tissue Updated:  07/12/19 2226     Significant Indicator NEG     Source TISS     Site Right Hip Cores      "Culture Result Culture in progress.     AFB Smear Results No acid fast bacilli seen.    AFB Culture [[id number]]     Order Status:  No result Specimen:  Tissue from Right Hip     Fungal Culture [[id number]]     Order Status:  No result Specimen:  Tissue from Right Hip           Assessment:  Active Hospital Problems    Diagnosis   • *Brain lesion [G93.9]   • Granulomatous disease  [L92.9]   • Abscess of right iliac muscle and right gluteus medius muscle [L02.91]   • Pseudoaneurysm following procedure (HCC) [I97.89, I72.9]   • Sepsis (HCC) [A41.9]   • Hypomagnesemia [E83.42]   • Hypercalcemia [E83.52]   • RLS (restless legs syndrome) [G25.81]   • Acquired circulating anticoagulants ([facility]) [D68.318]   • Non-severe protein-calorie malnutrition ([facility]) [E46]   • History of DVT (deep vein thrombosis) [Z86.718]   • Essential hypertension [I10]   • Chronic hyponatremia [E87.1]   • History of breast cancer [Z85.3]   • COPD (chronic obstructive pulmonary disease) ([facility]) [J44.9]   • Chronic pain [G89.29]     Interval 24 hour assessment:    AF, O2 RA,    Labs reviewed  Studies reviewed   Micro reviewed    Pt continued on RIPE and lipid ampho.  She reports some mild left hip pain, no HA, mentating well today.  Plan for PICC line due to poor IV access and MRI head today.         Assessment/Plan:     Encephalopathy, improved      Brain abscesses   Query CNS fungal infection vs mycobacterial or other   MRI 4/23 \"supra and infratentorial brain parenchyma. Decrease in the size of the lesions. Interval reduction in the extent of the surrounding white matter edema consistent with improvement/treatment response of the most of the multifocal brain abscesses.  MRI 5/21 showed innumerable microabscesses vs mets  Abx (vancomycin, cefepime and Flagyl) discontinued on 5/23 after ~8 weeks of treatment-developed new fevers on 6/4  MRI on 6/8 with interval progression of supra and infratentorial intra--axial nodules and associated edema.  New right " thalamus lesion. Radiology favors infection over neoplasm though both remain considerations (had been off abx)  Mult blood cultures neg  CHAS neg 3/15 and 5/24  MRI 6/22 worsening CNS lesions  S/p right craniotomy and resection of mass with biopsy on 6/28. Biopsy-per note fungal appearing elements seen per Neurosurgery-discussed with pathologist who examined the frozen section and there were no fungal appearing elements.   +necrotizing granulomas. All stains negative in EPIC  Fungal culture- negative to date  Bacterial cultures-negative to date  AFB culture-in progress  Pathology- focal necrotizing granulomatous inflammation; no fungal elements or acid-fast bacilli on stains.  No malignancy identified  Check electrolytes daily and replace as needed  Repeat cocci - negative      --- Continue liposomal Ampho B for now-  carefully monitor renal function, potassium and magnesium-if she develops significant electrolyte abnormalities or begins to develop kidney injury will stop  --- MRI head today- if worse then STOP ampho       --- Continue RIPE as patient does have a positive TB quant, she is from [place], we have necrotizing granulomas and CNS tuberculosis/OM appears to be the most likely etiology but we have no confirmed tissue diagnosis   --- Continue pyridoxine (B6) while on isoniazid  --- Will need occasional testing AST/ALT, both so far within normal limits  -Discussed at length with pathologist and no findings were specific enough to direct therapy.  Still with specimen in paraffin block, formalin was used   --- Follow-up brain biopsy specimen sent to [facility] for PCR testing, bacterial, fungal, AFB   --- Follow-up serologic testing sent out to [facility]  Brucella culture and ab - pending   Coxiella ab - negative   Histo ab serum & antigen urine- pending   Blasto ab- negative & antigen urine - pending       --- Discussed MRI findings with radiologist and there is easily obtained tissue/fluid in the sacrum  "and area that has been worsening despite therapy, per radiology images most consistent to TB or Brucellosis  -Pelvic biopsyon 7/11 - fluid drainage and specimen sent to [facility] again for AFB, bacterial and fungal, crypto and cocci PCR-confirmed with Alex this was sent out on 7/12   --- Also send biopsy specimens to [facility] for review for malignant cells, stains for fungal and AFB    --- Send biopsy specimen to [facility] for bacterial gram stain and culture, fungal smear and culture, AFB stain and culture- will instruct micro to hold for 14 days       Gluteal and iliopsoas abscess   OM L5, S1-3, new this admission  Recurrent abscesses, additional work  Adjacent to hardware in right hip (placed 12/17/18)  CT 4/23 \"Fluid collections within the right gluteal and iliacis muscles.. The collection within the right gluteal muscle has unchanged while the fluid collection within the right iliacis muscle is increased somewhat in size.\" 2.7 cm  Cultures 3/29 and 4/4 neg  MRI on 5/20/2019 - interval decrease in collection in R gluteal muscle and persistent multiloculated collection in R iliacus muscle.   CT 5/28 no change  s/p drainage on 5/30/2019-cultures negative  S/p removal hardware 6/5-no cultures done  1, 3 beta glucan neg  CT on 6/8 with residual abscess in the right iliacus muscle, 2.5 x 1.8 cm, slightly smaller than prior  s/p CT-guided deep bone biopsy 6/19/2019.  Cultures remain negative; path not reported   MRI 6/28 chronic discitis, diffuse OM L5, 3 and 4 cm abscesses right glute, 1.5 cm abscess or necrosis right posterior ileum, 3.3 cm abscess right SI joint     --- Ampho B as above and TB therapy     Right gluteal pseudoaneurysm, significant interval enlargement noted by IR during procedure on 7/11  -CTA planned      +QuantiGold  Query disseminated TB  Path with necrotizing granulomas  AFB stain neg  AFB cultures pending  All prior AFB cxs still neg  Started RIPE +B6 7/3     --- Repeat MRI will be " needed  --- Monitor for visual changes while on ethambutol- please ask if ophthalmology can get baseline vision as well as color vision testing done while inpatient     Type 2 DM  Hemoglobin A1c 6.3   Keep BS under 150 to help control current infection    Do not recommend transfer to outside facility until outside testing is complete and there is a plan of care.     Discussed with nurse. ID will follow.     ADDENDA    Reviewed MRI report- CNS lesions have progressed despite aggressive current therapy.  As we have no true fungal diagnosis or any evidence of fungal disease or any other known pathogen that would require Ampho treatment, and considering the toxic nature of this medication,  will stop amphotericin as it does not appear to be having a positive effect and the risks are high to cause harm if it is continued.  Will continue RIPE therapy for now and awaiting results of additional work-up as above.

## 2019-07-14 NOTE — PLAN OF CARE (IOPOC)
Spoke to brother Delonte ([phone]) at length, provided updates, informed about medications, MRI pending, improvement in mentation as I've been coming down on sedating and RLS medications. Appreciated the call. He was asking about her (eventually) transferring to a rehab facility or hospital closer to him in [place], [place]. Informed him that we will discuss this on Monday when regular staff is back in the hospital.

## 2019-07-15 LAB
ANION GAP SERPL CALC-SCNC: 7 MMOL/L (ref 0–11.9)
BUN SERPL-MCNC: 10 MG/DL (ref 8–22)
CALCIUM SERPL-MCNC: 9.2 MG/DL (ref 8.5–10.5)
CHLORIDE SERPL-SCNC: 108 MMOL/L (ref 96–112)
CO2 SERPL-SCNC: 22 MMOL/L (ref 20–33)
CREAT SERPL-MCNC: 0.61 MG/DL (ref 0.5–1.4)
ERYTHROCYTE [DISTWIDTH] IN BLOOD BY AUTOMATED COUNT: 55.8 FL (ref 35.9–50)
GLUCOSE SERPL-MCNC: 87 MG/DL (ref 65–99)
HCT VFR BLD AUTO: 29.6 % (ref 37–47)
HGB BLD-MCNC: 8.9 G/DL (ref 12–16)
MAGNESIUM SERPL-MCNC: 1.5 MG/DL (ref 1.5–2.5)
MCH RBC QN AUTO: 28.2 PG (ref 27–33)
MCHC RBC AUTO-ENTMCNC: 30.1 G/DL (ref 33.6–35)
MCV RBC AUTO: 93.7 FL (ref 81.4–97.8)
PHOSPHATE SERPL-MCNC: 4.5 MG/DL (ref 2.5–4.5)
PLATELET # BLD AUTO: 190 K/UL (ref 164–446)
PMV BLD AUTO: 9 FL (ref 9–12.9)
POTASSIUM SERPL-SCNC: 4.2 MMOL/L (ref 3.6–5.5)
RBC # BLD AUTO: 3.16 M/UL (ref 4.2–5.4)
SODIUM SERPL-SCNC: 137 MMOL/L (ref 135–145)
TEST NAME 95000: NORMAL
WBC # BLD AUTO: 4.4 K/UL (ref 4.8–10.8)

## 2019-07-15 PROCEDURE — 700111 HCHG RX REV CODE 636 W/ 250 OVERRIDE (IP): Performed by: FAMILY MEDICINE

## 2019-07-15 PROCEDURE — 700102 HCHG RX REV CODE 250 W/ 637 OVERRIDE(OP): Performed by: HOSPITALIST

## 2019-07-15 PROCEDURE — 700102 HCHG RX REV CODE 250 W/ 637 OVERRIDE(OP): Performed by: FAMILY MEDICINE

## 2019-07-15 PROCEDURE — A9270 NON-COVERED ITEM OR SERVICE: HCPCS | Performed by: HOSPITALIST

## 2019-07-15 PROCEDURE — A9270 NON-COVERED ITEM OR SERVICE: HCPCS | Performed by: FAMILY MEDICINE

## 2019-07-15 PROCEDURE — 99233 SBSQ HOSP IP/OBS HIGH 50: CPT | Performed by: INTERNAL MEDICINE

## 2019-07-15 PROCEDURE — 83735 ASSAY OF MAGNESIUM: CPT

## 2019-07-15 PROCEDURE — 80048 BASIC METABOLIC PNL TOTAL CA: CPT

## 2019-07-15 PROCEDURE — 700111 HCHG RX REV CODE 636 W/ 250 OVERRIDE (IP): Performed by: HOSPITALIST

## 2019-07-15 PROCEDURE — 85027 COMPLETE CBC AUTOMATED: CPT

## 2019-07-15 PROCEDURE — 770006 HCHG ROOM/CARE - MED/SURG/GYN SEMI*

## 2019-07-15 PROCEDURE — 84100 ASSAY OF PHOSPHORUS: CPT

## 2019-07-15 PROCEDURE — A9270 NON-COVERED ITEM OR SERVICE: HCPCS | Performed by: INTERNAL MEDICINE

## 2019-07-15 PROCEDURE — 700102 HCHG RX REV CODE 250 W/ 637 OVERRIDE(OP): Performed by: INTERNAL MEDICINE

## 2019-07-15 PROCEDURE — 99233 SBSQ HOSP IP/OBS HIGH 50: CPT | Performed by: HOSPITALIST

## 2019-07-15 PROCEDURE — 700101 HCHG RX REV CODE 250: Performed by: FAMILY MEDICINE

## 2019-07-15 RX ADMIN — LIDOCAINE 2 PATCH: 50 PATCH CUTANEOUS at 12:09

## 2019-07-15 RX ADMIN — HEPARIN SODIUM 5000 UNITS: 5000 INJECTION, SOLUTION INTRAVENOUS; SUBCUTANEOUS at 16:59

## 2019-07-15 RX ADMIN — ETHAMBUTOL HYDROCHLORIDE 800 MG: 400 TABLET, FILM COATED ORAL at 04:09

## 2019-07-15 RX ADMIN — RIFAMPIN 300 MG: 300 CAPSULE ORAL at 04:08

## 2019-07-15 RX ADMIN — RIFAMPIN 300 MG: 300 CAPSULE ORAL at 16:58

## 2019-07-15 RX ADMIN — SUCRALFATE 1 G: 1 SUSPENSION ORAL at 04:15

## 2019-07-15 RX ADMIN — GABAPENTIN 400 MG: 400 CAPSULE ORAL at 12:09

## 2019-07-15 RX ADMIN — ISONIAZID 300 MG: 300 TABLET ORAL at 04:12

## 2019-07-15 RX ADMIN — HEPARIN SODIUM 5000 UNITS: 5000 INJECTION, SOLUTION INTRAVENOUS; SUBCUTANEOUS at 04:17

## 2019-07-15 RX ADMIN — LEVETIRACETAM 500 MG: 500 TABLET, FILM COATED ORAL at 04:10

## 2019-07-15 RX ADMIN — PRAMIPEXOLE DIHYDROCHLORIDE 0.25 MG: 0.5 TABLET ORAL at 12:09

## 2019-07-15 RX ADMIN — ACETAMINOPHEN 1000 MG: 500 TABLET ORAL at 04:21

## 2019-07-15 RX ADMIN — SUCRALFATE 1 G: 1 SUSPENSION ORAL at 16:58

## 2019-07-15 RX ADMIN — PYRIDOXINE HCL TAB 50 MG 100 MG: 50 TAB at 04:11

## 2019-07-15 RX ADMIN — ATORVASTATIN CALCIUM 10 MG: 10 TABLET, FILM COATED ORAL at 16:58

## 2019-07-15 RX ADMIN — POTASSIUM CHLORIDE 40 MEQ: 20 TABLET, EXTENDED RELEASE ORAL at 16:59

## 2019-07-15 RX ADMIN — ONDANSETRON 4 MG: 2 INJECTION INTRAMUSCULAR; INTRAVENOUS at 16:33

## 2019-07-15 RX ADMIN — OXYCODONE HYDROCHLORIDE 20 MG: 20 TABLET, FILM COATED, EXTENDED RELEASE ORAL at 05:24

## 2019-07-15 RX ADMIN — METOPROLOL TARTRATE 25 MG: 25 TABLET, FILM COATED ORAL at 04:11

## 2019-07-15 RX ADMIN — LABETALOL HYDROCHLORIDE 10 MG: 5 INJECTION INTRAVENOUS at 05:39

## 2019-07-15 RX ADMIN — METOPROLOL TARTRATE 25 MG: 25 TABLET, FILM COATED ORAL at 16:59

## 2019-07-15 RX ADMIN — LOSARTAN POTASSIUM 50 MG: 50 TABLET ORAL at 04:16

## 2019-07-15 RX ADMIN — PRAMIPEXOLE DIHYDROCHLORIDE 0.25 MG: 0.5 TABLET ORAL at 16:58

## 2019-07-15 RX ADMIN — OXYCODONE HYDROCHLORIDE 5 MG: 5 TABLET ORAL at 08:41

## 2019-07-15 RX ADMIN — LEVETIRACETAM 500 MG: 500 TABLET, FILM COATED ORAL at 16:59

## 2019-07-15 RX ADMIN — GABAPENTIN 400 MG: 400 CAPSULE ORAL at 16:58

## 2019-07-15 RX ADMIN — OMEPRAZOLE 20 MG: 20 CAPSULE, DELAYED RELEASE ORAL at 04:10

## 2019-07-15 RX ADMIN — OXYCODONE HYDROCHLORIDE 10 MG: 5 TABLET ORAL at 02:43

## 2019-07-15 RX ADMIN — SENNOSIDES, DOCUSATE SODIUM 2 TABLET: 50; 8.6 TABLET, FILM COATED ORAL at 16:59

## 2019-07-15 RX ADMIN — ONDANSETRON 4 MG: 2 INJECTION INTRAMUSCULAR; INTRAVENOUS at 05:44

## 2019-07-15 RX ADMIN — GABAPENTIN 400 MG: 400 CAPSULE ORAL at 04:13

## 2019-07-15 RX ADMIN — SUCRALFATE 1 G: 1 SUSPENSION ORAL at 12:09

## 2019-07-15 RX ADMIN — FERROUS SULFATE TAB 325 MG (65 MG ELEMENTAL FE) 325 MG: 325 (65 FE) TAB at 08:42

## 2019-07-15 RX ADMIN — PRAMIPEXOLE DIHYDROCHLORIDE 0.25 MG: 0.5 TABLET ORAL at 04:07

## 2019-07-15 RX ADMIN — MAGNESIUM 64 MG (MAGNESIUM CHLORIDE) TABLET,DELAYED RELEASE 128 MG: at 04:05

## 2019-07-15 RX ADMIN — AMLODIPINE BESYLATE 10 MG: 5 TABLET ORAL at 04:12

## 2019-07-15 RX ADMIN — MAGNESIUM 64 MG (MAGNESIUM CHLORIDE) TABLET,DELAYED RELEASE 128 MG: at 16:58

## 2019-07-15 RX ADMIN — OXYCODONE HYDROCHLORIDE 20 MG: 20 TABLET, FILM COATED, EXTENDED RELEASE ORAL at 16:59

## 2019-07-15 RX ADMIN — CINACALCET HYDROCHLORIDE 60 MG: 30 TABLET, COATED ORAL at 04:05

## 2019-07-15 RX ADMIN — ACETAMINOPHEN 1000 MG: 500 TABLET ORAL at 16:58

## 2019-07-15 RX ADMIN — POTASSIUM CHLORIDE 40 MEQ: 20 TABLET, EXTENDED RELEASE ORAL at 04:13

## 2019-07-15 RX ADMIN — Medication 1 CAPSULE: at 08:41

## 2019-07-15 RX ADMIN — PYRAZINAMIDE 1000 MG: 500 TABLET ORAL at 04:07

## 2019-07-15 ASSESSMENT — ENCOUNTER SYMPTOMS
NERVOUS/ANXIOUS: 1
NAUSEA: 0
ABDOMINAL PAIN: 0
MYALGIAS: 1
CHILLS: 0
SENSORY CHANGE: 0
TREMORS: 1
HEADACHES: 0
VOMITING: 0
HEARTBURN: 0
COUGH: 0
SHORTNESS OF BREATH: 0
FEVER: 0
DIZZINESS: 0
WEAKNESS: 1
BLURRED VISION: 0
TINGLING: 0

## 2019-07-15 NOTE — CARE PLAN
Problem: Discharge Barriers/Planning  Goal: Patient's continuum of care needs will be met  Outcome: PROGRESSING AS EXPECTED  POC discussed with pt in-depth at beside with MD and bedside RN. Will dc to [facility] when medically clear.     Problem: Mobility  Goal: Risk for activity intolerance will decrease  Outcome: PROGRESSING SLOWER THAN EXPECTED  Pt needs max assistance for ambulation. PT/OT involved in care. Will continue to monitor.

## 2019-07-15 NOTE — PROGRESS NOTES
2RN skin check completed. No breakdown noted. Moisture to skin contact limited. Pt. Adjust weight in bed by self. Will continue to monitor.

## 2019-07-15 NOTE — PROGRESS NOTES
2 rn skin check    No open wounds, drainage, or signs of infection noted.  Brown/dark discoloration to bilat knees noted.   Will continue to monitor skin integrity.

## 2019-07-15 NOTE — PROGRESS NOTES
Neurosurgery Progress Note    Subjective:  Sitting up in bed eating, c/o hip pain, denies HA    Exam:  Awake ,alert, oriented to year not place/situation  Perrl, face sym, tongue ML  Str 5/5, no drift  Inc c/d w/ staples    BP  Min: 155/73  Max: 168/73  Pulse  Av.3  Min: 77  Max: 92  Resp  Av  Min: 15  Max: 17  Temp  Av.8 °C (98.2 °F)  Min: 36.2 °C (97.1 °F)  Max: 37.1 °C (98.7 °F)  SpO2  Av.3 %  Min: 90 %  Max: 100 %    No Data Recorded    Recent Labs      07/13/19   0522  07/14/19   0324  07/15/19   0345   WBC  5.9  5.1  4.4*   RBC  3.44*  3.32*  3.16*   HEMOGLOBIN  9.9*  9.7*  8.9*   HEMATOCRIT  35.4*  31.1*  29.6*   MCV  102.9*  93.7  93.7   MCH  28.8  29.2  28.2   MCHC  28.0*  31.2*  30.1*   RDW  64.1*  55.1*  55.8*   PLATELETCT  203  200  190   MPV  9.3  9.0  9.0     Recent Labs      07/13/19   0522  07/14/19   0324  07/15/19   0345   SODIUM  137  139  137   POTASSIUM  4.5  3.9  4.2   CHLORIDE  111  109  108   CO2  16*  22  22   GLUCOSE  102*  94  87   BUN  13  6*  10   CREATININE  0.71  0.54  0.61   CALCIUM  10.0  9.6  9.2               Intake/Output       19 - 07/15/19 0659 07/15/19 0700 - 19 0659       Total  Total       Intake    P.O.  480  300 780  --  -- --    P.O. 480 300 780 -- -- --    Total Intake 480 300 780 -- -- --       Output    Urine  --  -- --  --  -- --    Number of Times Voided 6 x 5 x 11 x -- -- --    Number of Times Incontinent of Urine -- 1 x 1 x -- -- --    Stool  --  -- --  --  -- --    Number of Times Stooled 1 x -- 1 x -- -- --    Total Output -- -- -- -- -- --       Net I/O     480 300 780 -- -- --            Intake/Output Summary (Last 24 hours) at 07/15/19 0886  Last data filed at 19 2200   Gross per 24 hour   Intake              540 ml   Output                0 ml   Net              540 ml            • pramipexole  0.25 mg TID   • senna-docusate  2 Tab BID   • polyethylene glycol/lytes  1 Packet QDAY  PRN   • magnesium hydroxide  30 mL QDAY PRN   • gabapentin  400 mg TID   • ferrous sulfate  325 mg QDAY with Breakfast   • diphenhydrAMINE   TID PRN   • morphine injection  2-4 mg Q2HRS PRN   • heparin  5,000 Units Q12HRS   • oxyCODONE CR  20 mg Q12HRS   • potassium chloride SA  40 mEq BID   • magnesium chloride  128 mg BID   • pyridoxine  100 mg DAILY   • levETIRAcetam  500 mg BID   • isoniazid  300 mg DAILY   • riFAMPin  300 mg BID   • ethambutol  800 mg DAILY   • pyrazinamide  1,000 mg DAILY   • acetaminophen  500 mg Q6HRS PRN   • Pharmacy Consult Request  1 Each PHARMACY TO DOSE   • labetalol  10 mg Q HOUR PRN   • hydrALAZINE  10 mg Q HOUR PRN   • NS   Continuous   • lactobacillus rhamnosus  1 Cap QDAY with Breakfast   • oxyCODONE immediate-release  5-10 mg Q4HRS PRN   • sucralfate  1 g Q6HRS   • acetaminophen  1,000 mg BID   • lidocaine  2 Patch Q24HR   • temazepam  15 mg QHS PRN   • cyclobenzaprine  10 mg TID PRN   • Respiratory Care per Protocol   Continuous RT   • amLODIPine  10 mg Q DAY   • cinacalcet  60 mg DAILY   • losartan  50 mg Q DAY   • atorvastatin  10 mg Q EVENING   • omeprazole  20 mg DAILY   • metoprolol  25 mg TWICE DAILY   • ondansetron  4 mg Q4HRS PRN   • ondansetron  4 mg Q4HRS PRN       Assessment and Dejah  POD # 17 right crani for brain abscess  Prophylactic anticoagulation: yes         Start date/time: on heparin SQ    Neuro as above  Fungal inf- tx per ID  D/c staples  Wound healing well

## 2019-07-15 NOTE — PROGRESS NOTES
Infectious Disease Progress Note    Author: Ashely Hinojosa M.D. Date & Time of service: 7/15/2019  12:09 PM    Chief Complaint:  Brain abscess, gluteal abscess  Vertebral OM     Interval History:  68 y.o. female admitted 5/29/2019 as a transfer from [facility] since 4/30/2019. + diabetes, SANTOSH of right hip with hardware, and breast cancer who was originally admitted to [facility] on 03/08/2019 for right hip and pelvic pain.  Work-up revealed a right iliopsoas lesion, gluteal abscess, and mult brain abscesses     7/4 AF much more alert and conversant today-c/o left hip pain, unrelenting and would like parietal dressing changed. HA at surgical site.  Denies SE abx. No new neurodeficits  7/5 afebrile WBC 5.4.  Patient sleeping but arousable.  She denies any headache, nausea, vomiting.  7/6 afebrile WBC 6.5.  Patient sitting up in chair and having excruciating back pain  7/8 Pt has no specific complaints, she is mildly confused today.  She seems to be tolerating her medications with no significant lab abnormalities.  7/9 AF, O2 RA, Significant left hip pain today.  She does appear to be tolerating her antibiotics.  Ultrasound of bilateral lower extremities has been ordered.  7/10 AF, O2 RA,  Plan for IR drainage of sacral collection soon.   7/11 AF, O2 RA,  Pt continued on RIPE and ampho.  She is tolerating well overall, requiring some mag and potassium replacement.  She is complaining of severe pain in left hip again today.  Plan for biopsy later today.   7/12  IR biopsy of R gluteal abscess/hip. AF, O2 2 L NC,   tolerating antibiotics so far.  She is quite somnolent today but per nursing she was oriented x4 earlier  7/14 AF, O2 RA, more alert today, conversant and asking for advance in diet. Left hip pain ongoing but improved.   7/15 afebrile WBC 4.4.  Patient's speech is somewhat muffled but she is alert and responding to questions appropriately.  MRI shows worsening lesions and amphotericin stopped.  Nurse reports  waxing and waning mental status    Review of Systems:  Review of Systems   Constitutional: Negative for chills and fever.   Gastrointestinal: Negative for abdominal pain, nausea and vomiting.   Neurological: Negative for dizziness and headaches.       Hemodynamics:  Temp (24hrs), Av.8 °C (98.2 °F), Min:36.2 °C (97.1 °F), Max:37.1 °C (98.7 °F)  Temperature: 36.2 °C (97.1 °F)  Pulse  Av.4  Min: 49  Max: 195   Blood Pressure : 152/68       Physical Exam:  Physical Exam   Constitutional:   Elderly  Chronically ill-appearing   HENT:   Mouth/Throat: No oropharyngeal exudate.   Right parietal surgical site with staples in place.  Well approximated, no drainage or surrounding erythema   Eyes: Pupils are equal, round, and reactive to light.   Neck: Neck supple.   Cardiovascular: Normal rate and regular rhythm.    Pulmonary/Chest: Effort normal. She has no wheezes. She has no rales.   Abdominal: Soft.   Musculoskeletal: She exhibits no edema.   Neurological: She is alert.   Skin: She is not diaphoretic.       Meds:    Current Facility-Administered Medications:   •  pramipexole  •  senna-docusate  •  polyethylene glycol/lytes  •  magnesium hydroxide  •  gabapentin  •  ferrous sulfate  •  diphenhydrAMINE  •  morphine injection  •  heparin  •  oxyCODONE CR  •  potassium chloride SA  •  magnesium chloride  •  pyridoxine  •  levETIRAcetam  •  isoniazid  •  riFAMPin  •  ethambutol  •  pyrazinamide  •  acetaminophen  •  Pharmacy Consult Request  •  labetalol  •  hydrALAZINE  •  NS  •  lactobacillus rhamnosus  •  oxyCODONE immediate-release  •  sucralfate  •  acetaminophen  •  lidocaine  •  temazepam  •  cyclobenzaprine  •  Respiratory Care per Protocol  •  amLODIPine  •  cinacalcet  •  losartan  •  atorvastatin  •  omeprazole  •  metoprolol  •  ondansetron  •  ondansetron    Labs:  Recent Labs      19   0522  19   0324  07/15/19   0345   WBC  5.9  5.1  4.4*   RBC  3.44*  3.32*  3.16*   HEMOGLOBIN  9.9*  9.7*   8.9*   HEMATOCRIT  35.4*  31.1*  29.6*   MCV  102.9*  93.7  93.7   MCH  28.8  29.2  28.2   RDW  64.1*  55.1*  55.8*   PLATELETCT  203  200  190   MPV  9.3  9.0  9.0     Recent Labs      07/13/19 0522 07/14/19   0324  07/15/19   0345   SODIUM  137  139  137   POTASSIUM  4.5  3.9  4.2   CHLORIDE  111  109  108   CO2  16*  22  22   GLUCOSE  102*  94  87   BUN  13  6*  10     Recent Labs      07/13/19 0522 07/14/19   0324  07/15/19   0345   CREATININE  0.71  0.54  0.61       Imaging:  MRI on 7/14/2019  Impression:       1.  Innumerable nodular enhancing lesions throughout the brain parenchyma both the supra and infratentorial compartments predominantly near the gray-white junction. There has been interval increase in size of several of these lesions since previous exam.   These changes may be secondary to metastatic disease or granulomatous disease.    2.  Interval right parietal craniotomy with underlying elliptical postsurgical defect.    3.  Interval increase in size of index right thalamic lesion which has increased vasogenic edema since previous exam.    4.  Age-related cerebral atrophy.       Micro:  Results     Procedure Component Value Units Date/Time    CULTURE TISSUE W/ GRM STAIN [[id number]] Collected:  07/11/19 1745    Order Status:  Completed Specimen:  Tissue Updated:  07/15/19 0710     Significant Indicator NEG     Source TISS     Site Right Hip Cores     Culture Result No growth at 72 hours.     Gram Stain Result No organisms seen.    Narrative:       Radiology specimen Hold specimen 14 days per Dr. Junior    Anaerobic Culture [[id number]] Collected:  07/11/19 1745    Order Status:  Completed Specimen:  Tissue Updated:  07/15/19 0710     Significant Indicator NEG     Source TISS     Site Right Hip Cores     Culture Result Culture in progress.    Narrative:       Radiology specimen Hold specimen 14 days per Dr. Junior    GRAM STAIN [[id number]] Collected:  07/11/19 1745    Order Status:  Completed  Specimen:  Tissue Updated:  07/12/19 2241     Significant Indicator .     Source TISS     Site Right Hip Cores     Gram Stain Result No organisms seen.    Narrative:       Radiology specimen Hold specimen 14 days per Dr. Junior    Acid Fast Stain [[id number]] Collected:  07/11/19 1745    Order Status:  Completed Specimen:  Tissue Updated:  07/12/19 2226     Significant Indicator NEG     Source TISS     Site Right Hip Cores     AFB Smear Results No acid fast bacilli seen.    Fungal Culture [[id number]] Collected:  07/11/19 1745    Order Status:  Completed Specimen:  Tissue Updated:  07/12/19 2226     Significant Indicator NEG     Source TISS     Site Right Hip Cores     Culture Result Culture in progress.    AFB Culture [[id number]] Collected:  07/11/19 1745    Order Status:  Completed Specimen:  Tissue Updated:  07/12/19 2226     Significant Indicator NEG     Source TISS     Site Right Hip Cores     Culture Result Culture in progress.     AFB Smear Results No acid fast bacilli seen.    AFB Culture [[id number]]     Order Status:  No result Specimen:  Tissue from Right Hip     Fungal Culture [[id number]]     Order Status:  No result Specimen:  Tissue from Right Hip           Assessment:  Active Hospital Problems    Diagnosis   • *Brain lesion [G93.9]   • Granulomatous disease  [L92.9]   • Abscess of right iliac muscle and right gluteus medius muscle [L02.91]   • Pseudoaneurysm following procedure ([facility]) [I97.89, I72.9]   • Sepsis ([facility]) [A41.9]   • Hypomagnesemia [E83.42]   • Hypercalcemia [E83.52]   • RLS (restless legs syndrome) [G25.81]   • Acquired circulating anticoagulants ([facility]) [D68.318]   • Non-severe protein-calorie malnutrition ([facility]) [E46]   • History of DVT (deep vein thrombosis) [Z86.718]   • Essential hypertension [I10]   • Chronic hyponatremia [E87.1]   • History of breast cancer [Z85.3]   • COPD (chronic obstructive pulmonary disease) ([facility]) [J44.9]   • Chronic pain [G89.29]  "      Assessment/Plan:  Encephalopathy, improved      Brain lesions, worse on MRI  Query CNS fungal infection vs mycobacterial or other   MRI 4/23 \"supra and infratentorial brain parenchyma. Decrease in the size of the lesions. Interval reduction in the extent of the surrounding white matter edema consistent with improvement/treatment response of the most of the multifocal brain abscesses.  MRI 5/21 showed innumerable microabscesses vs mets  Abx (vancomycin, cefepime and Flagyl) discontinued on 5/23 after ~8 weeks of treatment-developed new fevers on 6/4  MRI on 6/8 with interval progression of supra and infratentorial intra--axial nodules and associated edema.  New right thalamus lesion. Radiology favors infection over neoplasm though both remain considerations (had been off abx)  Mult blood cultures neg  CHAS neg 3/15 and 5/24  MRI 6/22 worsening CNS lesions  S/p right craniotomy and resection of mass with biopsy on 6/28. Biopsy-per note fungal appearing elements seen per Neurosurgery-discussed with pathologist who examined the frozen section and there were no fungal appearing elements.   +necrotizing granulomas. All stains negative in EPIC  Fungal culture- negative to date  Bacterial cultures-negative to date  AFB culture-in progress  Pathology- focal necrotizing granulomatous inflammation; no fungal elements or acid-fast bacilli on stains.  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 but we have no confirmed tissue diagnosis  Continue pyridoxine (B6) while on isoniazid  Will need occasional testing AST/ALT, both so far within normal limits  Follow-up brain biopsy specimen sent to [facility] for PCR testing, bacterial, fungal, AFB " "  Follow-up serologic testing sent out to [facility]  Brucella culture and ab - pending   Coxiella ab - negative   Histo ab serum & antigen urine-negative  Blasto ab- negative & antigen urine - pending       --- Discussed MRI findings with radiologist and there is easily obtained tissue/fluid in the sacrum and area that has been worsening despite therapy, per radiology images most consistent to TB or Brucellosis  -Pelvic biopsyon 7/11 - fluid drainage and [facility] for AFB, bacterial and fungal, crypto and cocci PCR-confirmed with Alex this was sent out on 7/12   --- Also send biopsy specimens to [facility] for review for malignant cells, stains for fungal and AFB    --- Send biopsy specimen to [facility] for bacterial gram stain and culture, fungal smear and culture, AFB stain and culture- will instruct micro to hold for 14 days       Gluteal and iliopsoas abscess   OM L5, S1-3, new this admission  Recurrent abscesses, additional work  Adjacent to hardware in right hip (placed 12/17/18)  CT 4/23 \"Fluid collections within the right gluteal and iliacis muscles.. The collection within the right gluteal muscle has unchanged while the fluid collection within the right iliacis muscle is increased somewhat in size.\" 2.7 cm  Cultures 3/29 and 4/4 neg  MRI on 5/20/2019 - interval decrease in collection in R gluteal muscle and persistent multiloculated collection in R iliacus muscle.   CT 5/28 no change  s/p drainage on 5/30/2019-cultures negative  S/p removal hardware 6/5-no cultures done  1, 3 beta glucan neg  CT on 6/8 with residual abscess in the right iliacus muscle, 2.5 x 1.8 cm, slightly smaller than prior  s/p CT-guided deep bone biopsy 6/19/2019.  Cultures remain negative; path not reported   MRI 6/28 chronic discitis, diffuse OM L5, 3 and 4 cm abscesses right glute, 1.5 cm abscess or necrosis right posterior ileum, 3.3 cm abscess right SI joint   Continue TB therapy     Right gluteal " pseudoaneurysm, significant interval enlargement noted by IR during procedure on 7/11  -CTA planned      +QuantiGold  Query disseminated TB  Path with necrotizing granulomas  AFB stain neg  AFB cultures pending  All prior AFB cxs still neg  Started RIPE +B6 7/3     Monitor for visual changes while on ethambutol- please ask if ophthalmology can get baseline vision as well as color vision testing done while inpatient     Type 2 DM  Hemoglobin A1c 6.3   Keep BS under 150 to help control current infection    Do not recommend transfer to outside facility until outside testing is complete and there is a plan of care.     Discussed with IM, Dr. Alatorre

## 2019-07-16 LAB
FUNGUS SPEC CULT: NORMAL
SIGNIFICANT IND 70042: NORMAL
SITE SITE: NORMAL
SOURCE SOURCE: NORMAL

## 2019-07-16 PROCEDURE — 700101 HCHG RX REV CODE 250: Performed by: FAMILY MEDICINE

## 2019-07-16 PROCEDURE — 700111 HCHG RX REV CODE 636 W/ 250 OVERRIDE (IP): Performed by: FAMILY MEDICINE

## 2019-07-16 PROCEDURE — A9270 NON-COVERED ITEM OR SERVICE: HCPCS | Performed by: HOSPITALIST

## 2019-07-16 PROCEDURE — 700111 HCHG RX REV CODE 636 W/ 250 OVERRIDE (IP): Performed by: HOSPITALIST

## 2019-07-16 PROCEDURE — 700102 HCHG RX REV CODE 250 W/ 637 OVERRIDE(OP): Performed by: HOSPITALIST

## 2019-07-16 PROCEDURE — A9270 NON-COVERED ITEM OR SERVICE: HCPCS | Performed by: INTERNAL MEDICINE

## 2019-07-16 PROCEDURE — A9270 NON-COVERED ITEM OR SERVICE: HCPCS | Performed by: FAMILY MEDICINE

## 2019-07-16 PROCEDURE — 700102 HCHG RX REV CODE 250 W/ 637 OVERRIDE(OP): Performed by: FAMILY MEDICINE

## 2019-07-16 PROCEDURE — 770006 HCHG ROOM/CARE - MED/SURG/GYN SEMI*

## 2019-07-16 PROCEDURE — 99232 SBSQ HOSP IP/OBS MODERATE 35: CPT | Performed by: HOSPITALIST

## 2019-07-16 PROCEDURE — 97535 SELF CARE MNGMENT TRAINING: CPT

## 2019-07-16 PROCEDURE — 700102 HCHG RX REV CODE 250 W/ 637 OVERRIDE(OP): Performed by: INTERNAL MEDICINE

## 2019-07-16 PROCEDURE — 97530 THERAPEUTIC ACTIVITIES: CPT

## 2019-07-16 PROCEDURE — 99232 SBSQ HOSP IP/OBS MODERATE 35: CPT | Performed by: INTERNAL MEDICINE

## 2019-07-16 RX ORDER — LOSARTAN POTASSIUM 50 MG/1
75 TABLET ORAL
Status: DISCONTINUED | OUTPATIENT
Start: 2019-07-17 | End: 2019-08-10 | Stop reason: HOSPADM

## 2019-07-16 RX ADMIN — HYDRALAZINE HYDROCHLORIDE 10 MG: 20 INJECTION INTRAMUSCULAR; INTRAVENOUS at 07:40

## 2019-07-16 RX ADMIN — PRAMIPEXOLE DIHYDROCHLORIDE 0.25 MG: 0.5 TABLET ORAL at 05:06

## 2019-07-16 RX ADMIN — LEVETIRACETAM 500 MG: 500 TABLET, FILM COATED ORAL at 18:06

## 2019-07-16 RX ADMIN — ACETAMINOPHEN 1000 MG: 500 TABLET ORAL at 05:06

## 2019-07-16 RX ADMIN — GABAPENTIN 400 MG: 400 CAPSULE ORAL at 11:55

## 2019-07-16 RX ADMIN — ISONIAZID 300 MG: 300 TABLET ORAL at 05:07

## 2019-07-16 RX ADMIN — GABAPENTIN 400 MG: 400 CAPSULE ORAL at 18:06

## 2019-07-16 RX ADMIN — MAGNESIUM 64 MG (MAGNESIUM CHLORIDE) TABLET,DELAYED RELEASE 128 MG: at 18:05

## 2019-07-16 RX ADMIN — Medication 1 CAPSULE: at 07:40

## 2019-07-16 RX ADMIN — MAGNESIUM 64 MG (MAGNESIUM CHLORIDE) TABLET,DELAYED RELEASE 128 MG: at 05:06

## 2019-07-16 RX ADMIN — RIFAMPIN 300 MG: 300 CAPSULE ORAL at 18:05

## 2019-07-16 RX ADMIN — OMEPRAZOLE 20 MG: 20 CAPSULE, DELAYED RELEASE ORAL at 05:07

## 2019-07-16 RX ADMIN — LIDOCAINE 2 PATCH: 50 PATCH CUTANEOUS at 11:55

## 2019-07-16 RX ADMIN — ETHAMBUTOL HYDROCHLORIDE 800 MG: 400 TABLET, FILM COATED ORAL at 05:06

## 2019-07-16 RX ADMIN — DIPHENHYDRAMINE HYDROCHLORIDE, ZINC ACETATE: 2; .1 CREAM TOPICAL at 05:07

## 2019-07-16 RX ADMIN — OXYCODONE HYDROCHLORIDE 20 MG: 20 TABLET, FILM COATED, EXTENDED RELEASE ORAL at 18:05

## 2019-07-16 RX ADMIN — HEPARIN SODIUM 5000 UNITS: 5000 INJECTION, SOLUTION INTRAVENOUS; SUBCUTANEOUS at 18:06

## 2019-07-16 RX ADMIN — PRAMIPEXOLE DIHYDROCHLORIDE 0.25 MG: 0.5 TABLET ORAL at 11:55

## 2019-07-16 RX ADMIN — ONDANSETRON 4 MG: 2 INJECTION INTRAMUSCULAR; INTRAVENOUS at 18:14

## 2019-07-16 RX ADMIN — PYRAZINAMIDE 1000 MG: 500 TABLET ORAL at 05:15

## 2019-07-16 RX ADMIN — ACETAMINOPHEN 1000 MG: 500 TABLET ORAL at 18:05

## 2019-07-16 RX ADMIN — SUCRALFATE 1 G: 1 SUSPENSION ORAL at 05:06

## 2019-07-16 RX ADMIN — ATORVASTATIN CALCIUM 10 MG: 10 TABLET, FILM COATED ORAL at 18:05

## 2019-07-16 RX ADMIN — METOPROLOL TARTRATE 25 MG: 25 TABLET, FILM COATED ORAL at 05:07

## 2019-07-16 RX ADMIN — LEVETIRACETAM 500 MG: 500 TABLET, FILM COATED ORAL at 05:06

## 2019-07-16 RX ADMIN — AMLODIPINE BESYLATE 10 MG: 5 TABLET ORAL at 05:06

## 2019-07-16 RX ADMIN — OXYCODONE HYDROCHLORIDE 5 MG: 5 TABLET ORAL at 12:26

## 2019-07-16 RX ADMIN — SENNOSIDES, DOCUSATE SODIUM 2 TABLET: 50; 8.6 TABLET, FILM COATED ORAL at 18:06

## 2019-07-16 RX ADMIN — POTASSIUM CHLORIDE 40 MEQ: 20 TABLET, EXTENDED RELEASE ORAL at 18:06

## 2019-07-16 RX ADMIN — SENNOSIDES, DOCUSATE SODIUM 2 TABLET: 50; 8.6 TABLET, FILM COATED ORAL at 05:07

## 2019-07-16 RX ADMIN — PYRIDOXINE HCL TAB 50 MG 100 MG: 50 TAB at 05:06

## 2019-07-16 RX ADMIN — METOPROLOL TARTRATE 25 MG: 25 TABLET, FILM COATED ORAL at 18:06

## 2019-07-16 RX ADMIN — CYCLOBENZAPRINE 10 MG: 10 TABLET, FILM COATED ORAL at 02:23

## 2019-07-16 RX ADMIN — CINACALCET HYDROCHLORIDE 60 MG: 30 TABLET, COATED ORAL at 05:15

## 2019-07-16 RX ADMIN — GABAPENTIN 400 MG: 400 CAPSULE ORAL at 05:07

## 2019-07-16 RX ADMIN — LOSARTAN POTASSIUM 50 MG: 50 TABLET ORAL at 05:07

## 2019-07-16 RX ADMIN — OXYCODONE HYDROCHLORIDE 20 MG: 20 TABLET, FILM COATED, EXTENDED RELEASE ORAL at 05:07

## 2019-07-16 RX ADMIN — PRAMIPEXOLE DIHYDROCHLORIDE 0.25 MG: 0.5 TABLET ORAL at 18:05

## 2019-07-16 RX ADMIN — SUCRALFATE 1 G: 1 SUSPENSION ORAL at 18:06

## 2019-07-16 RX ADMIN — FERROUS SULFATE TAB 325 MG (65 MG ELEMENTAL FE) 325 MG: 325 (65 FE) TAB at 07:40

## 2019-07-16 RX ADMIN — HEPARIN SODIUM 5000 UNITS: 5000 INJECTION, SOLUTION INTRAVENOUS; SUBCUTANEOUS at 05:07

## 2019-07-16 RX ADMIN — POTASSIUM CHLORIDE 40 MEQ: 20 TABLET, EXTENDED RELEASE ORAL at 05:07

## 2019-07-16 RX ADMIN — RIFAMPIN 300 MG: 300 CAPSULE ORAL at 05:06

## 2019-07-16 RX ADMIN — SUCRALFATE 1 G: 1 SUSPENSION ORAL at 11:55

## 2019-07-16 ASSESSMENT — COGNITIVE AND FUNCTIONAL STATUS - GENERAL
SUGGESTED CMS G CODE MODIFIER DAILY ACTIVITY: CK
HELP NEEDED FOR BATHING: A LOT
SUGGESTED CMS G CODE MODIFIER MOBILITY: CL
WALKING IN HOSPITAL ROOM: A LITTLE
DRESSING REGULAR UPPER BODY CLOTHING: A LITTLE
MOBILITY SCORE: 14
MOVING FROM LYING ON BACK TO SITTING ON SIDE OF FLAT BED: A LOT
TOILETING: TOTAL
PERSONAL GROOMING: A LITTLE
TURNING FROM BACK TO SIDE WHILE IN FLAT BAD: A LOT
STANDING UP FROM CHAIR USING ARMS: A LITTLE
CLIMB 3 TO 5 STEPS WITH RAILING: A LOT
EATING MEALS: A LITTLE
MOVING TO AND FROM BED TO CHAIR: A LOT
DAILY ACTIVITIY SCORE: 15
DRESSING REGULAR LOWER BODY CLOTHING: A LITTLE

## 2019-07-16 ASSESSMENT — ENCOUNTER SYMPTOMS
NECK PAIN: 0
BLURRED VISION: 0
TREMORS: 1
WEAKNESS: 1
HEARTBURN: 0
ABDOMINAL PAIN: 0
SENSORY CHANGE: 0
DIZZINESS: 0
COUGH: 0
TINGLING: 0
MYALGIAS: 0
NERVOUS/ANXIOUS: 1
VOMITING: 0
HEADACHES: 0
NAUSEA: 0
CHILLS: 0
FEVER: 0

## 2019-07-16 ASSESSMENT — GAIT ASSESSMENTS
ASSISTIVE DEVICE: FRONT WHEEL WALKER
GAIT LEVEL OF ASSIST: MINIMAL ASSIST
DISTANCE (FEET): 15
DEVIATION: ANTALGIC;SHUFFLED GAIT

## 2019-07-16 NOTE — PROGRESS NOTES
2 RN skin check completed.      -Sacrum pink and blanching.   -BLE dusky and dry.  -Heels pink and blanching, bruising noted.   -Right head incision from crani site.   -Staples removed. Site CDI, SHAZIA.   -Pt able to reposition self .   -Barrier wipes and cream.

## 2019-07-16 NOTE — PROGRESS NOTES
2 RN skin check completed    - sacral area intact, tan in color  - BL heels red, soft and blanching  - pt turns self in bed  - all skin dry and intact, no areas of concern.

## 2019-07-16 NOTE — PROGRESS NOTES
Infectious Disease Progress Note    Author: Ashely Hinojosa M.D. Date & Time of service: 7/16/2019  11:41 AM    Chief Complaint:  Brain abscess, gluteal abscess  Vertebral OM     Interval History:  68 y.o. female admitted 5/29/2019 as a transfer from [facility] since 4/30/2019. + diabetes, SANTOSH of right hip with hardware, and breast cancer who was originally admitted to [facility] on 03/08/2019 for right hip and pelvic pain.  Work-up revealed a right iliopsoas lesion, gluteal abscess, and mult brain abscesses     7/4 AF much more alert and conversant today-c/o left hip pain, unrelenting and would like parietal dressing changed. HA at surgical site.  Denies SE abx. No new neurodeficits  7/5 afebrile WBC 5.4.  Patient sleeping but arousable.  She denies any headache, nausea, vomiting.  7/6 afebrile WBC 6.5.  Patient sitting up in chair and having excruciating back pain  7/8 Pt has no specific complaints, she is mildly confused today.  She seems to be tolerating her medications with no significant lab abnormalities.  7/9 AF, O2 RA, Significant left hip pain today.  She does appear to be tolerating her antibiotics.  Ultrasound of bilateral lower extremities has been ordered.  7/10 AF, O2 RA,  Plan for IR drainage of sacral collection soon.   7/11 AF, O2 RA,  Pt continued on RIPE and ampho.  She is tolerating well overall, requiring some mag and potassium replacement.  She is complaining of severe pain in left hip again today.  Plan for biopsy later today.   7/12  IR biopsy of R gluteal abscess/hip. AF, O2 2 L NC,   tolerating antibiotics so far.  She is quite somnolent today but per nursing she was oriented x4 earlier  7/14 AF, O2 RA, more alert today, conversant and asking for advance in diet. Left hip pain ongoing but improved.   7/15 afebrile WBC 4.4.  Patient's speech is somewhat muffled but she is alert and responding to questions appropriately.  MRI shows worsening lesions and amphotericin stopped.  Nurse reports  waxing and waning mental status  16 afebrile.  Patient sleeping but arousable.  She is answer questions appropriately but then forgets that she is in the hospital.    Review of Systems:  Review of Systems   Constitutional: Negative for chills and fever.   Gastrointestinal: Negative for abdominal pain, nausea and vomiting.   Neurological: Negative for dizziness and headaches.       Hemodynamics:  Temp (24hrs), Av.9 °C (98.4 °F), Min:36.6 °C (97.9 °F), Max:37.1 °C (98.8 °F)  Temperature: 37.1 °C (98.8 °F)  Pulse  Av.4  Min: 49  Max: 195   Blood Pressure : 137/69       Physical Exam:  Physical Exam   Constitutional:   Elderly  Chronically ill-appearing   HENT:   Mouth/Throat: No oropharyngeal exudate.   Right parietal surgical site with staples in place.  Well approximated, no drainage or surrounding erythema   Eyes: Pupils are equal, round, and reactive to light.   Neck: Neck supple.   Cardiovascular: Normal rate and regular rhythm.    Pulmonary/Chest: Effort normal. She has no wheezes. She has no rales.   Abdominal: Soft.   Musculoskeletal: She exhibits no edema.   Neurological: She is alert.   Skin: She is not diaphoretic.       Meds:    Current Facility-Administered Medications:   •  pramipexole  •  senna-docusate  •  polyethylene glycol/lytes  •  magnesium hydroxide  •  gabapentin  •  ferrous sulfate  •  diphenhydrAMINE  •  morphine injection  •  heparin  •  oxyCODONE CR  •  potassium chloride SA  •  magnesium chloride  •  pyridoxine  •  levETIRAcetam  •  isoniazid  •  riFAMPin  •  ethambutol  •  pyrazinamide  •  acetaminophen  •  Pharmacy Consult Request  •  labetalol  •  hydrALAZINE  •  NS  •  lactobacillus rhamnosus  •  oxyCODONE immediate-release  •  sucralfate  •  acetaminophen  •  lidocaine  •  temazepam  •  cyclobenzaprine  •  Respiratory Care per Protocol  •  amLODIPine  •  cinacalcet  •  losartan  •  atorvastatin  •  omeprazole  •  metoprolol  •  ondansetron  •  ondansetron    Labs:  Recent  Labs      07/14/19   0324  07/15/19   0345   WBC  5.1  4.4*   RBC  3.32*  3.16*   HEMOGLOBIN  9.7*  8.9*   HEMATOCRIT  31.1*  29.6*   MCV  93.7  93.7   MCH  29.2  28.2   RDW  55.1*  55.8*   PLATELETCT  200  190   MPV  9.0  9.0     Recent Labs      07/14/19   0324  07/15/19   0345   SODIUM  139  137   POTASSIUM  3.9  4.2   CHLORIDE  109  108   CO2  22  22   GLUCOSE  94  87   BUN  6*  10     Recent Labs      07/14/19   0324  07/15/19   0345   CREATININE  0.54  0.61       Imaging:  MRI on 7/14/2019  Impression:       1.  Innumerable nodular enhancing lesions throughout the brain parenchyma both the supra and infratentorial compartments predominantly near the gray-white junction. There has been interval increase in size of several of these lesions since previous exam.   These changes may be secondary to metastatic disease or granulomatous disease.    2.  Interval right parietal craniotomy with underlying elliptical postsurgical defect.    3.  Interval increase in size of index right thalamic lesion which has increased vasogenic edema since previous exam.    4.  Age-related cerebral atrophy.       Micro:  Results     Procedure Component Value Units Date/Time    Anaerobic Culture [[id number]] Collected:  07/11/19 1745    Order Status:  Completed Specimen:  Tissue Updated:  07/16/19 0931     Significant Indicator NEG     Source TISS     Site Right Hip Cores     Culture Result Culture in progress.    Narrative:       Radiology specimen Hold specimen 14 days per Dr. Junior    CULTURE TISSUE W/ GRM STAIN [[id number]] Collected:  07/11/19 1745    Order Status:  Completed Specimen:  Tissue Updated:  07/16/19 0931     Significant Indicator NEG     Source TISS     Site Right Hip Cores     Culture Result No growth at 4 days.     Gram Stain Result No organisms seen.    Narrative:       Radiology specimen Hold specimen 14 days per Dr. Junior    Fungal Culture [[id number]] Collected:  07/11/19 1745    Order Status:  Completed  Specimen:  Tissue Updated:  07/16/19 0908     Significant Indicator NEG     Source TISS     Site Right Hip Cores     Culture Result No fungal growth to date.    AFB Culture [[id number]] Collected:  07/11/19 1745    Order Status:  Completed Specimen:  Tissue Updated:  07/16/19 0908     Significant Indicator NEG     Source TISS     Site Right Hip Cores     Culture Result Culture in progress.     AFB Smear Results No acid fast bacilli seen.    Fungal Smear [[id number]] Collected:  06/19/19 0930    Order Status:  Completed Specimen:  Tissue from Other Body Fluid Updated:  07/16/19 0735     Significant Indicator NEG     Source TISS     Site Right Hip deep bone     Fungal Smear Results No fungal elements seen.    Narrative:       Collected By:[id number] BEATRICE HARRIS  Bone biopsy right hip  Collected By:[id number] BEATRICE HARRIS  Right hip fluid collection  Collected By:[id number] BEATRICE HARRIS    Fungal Culture [[id number]] Collected:  06/19/19 0930    Order Status:  Completed Specimen:  Tissue Updated:  07/16/19 0735     Significant Indicator NEG     Source TISS     Site Right Hip deep bone     Culture Result No fungal growth.    Narrative:       Collected By:[id number] BEATRICE HARRIS  Bone biopsy right hip  Collected By:[id number] BEATRICE HARRIS  Right hip fluid collection  Collected By:[id number] BEATRICE HARRIS    CULTURE TISSUE W/ GRM STAIN [[id number]] Collected:  06/19/19 0930    Order Status:  Completed Specimen:  Tissue Updated:  07/16/19 0735     Significant Indicator NEG     Source TISS     Site Right Hip deep bone     Culture Result No growth at 72 hours.     Gram Stain Result No organisms seen.    Narrative:       Collected By:[id number] BEATRICE HARRIS  Bone biopsy right hip  Collected By:[id number] BEATRICE HARRIS  Right hip fluid collection  Collected By:[id number] BEATRICE HARRIS    AFB Culture [[id number]] Collected:  06/19/19 0930    Order Status:  Completed Specimen:  Tissue from Other Body Fluid Updated:  07/16/19 0735     Significant Indicator NEG     " Source TISS     Site Right Hip deep bone     Culture Result Culture in progress.     AFB Smear Results No acid fast bacilli seen.    Narrative:       Collected By:[id number] BEATRICE HARRIS  Bone biopsy right hip  Collected By:[id number] BEATRICE HARRIS  Right hip fluid collection  Collected By:[id number] BEATRICE HARRIS    GRAM STAIN [[id number]] Collected:  07/11/19 1745    Order Status:  Completed Specimen:  Tissue Updated:  07/12/19 2241     Significant Indicator .     Source TISS     Site Right Hip Cores     Gram Stain Result No organisms seen.    Narrative:       Radiology specimen Hold specimen 14 days per Dr. Junior    Acid Fast Stain [[id number]] Collected:  07/11/19 1745    Order Status:  Completed Specimen:  Tissue Updated:  07/12/19 2226     Significant Indicator NEG     Source TISS     Site Right Hip Cores     AFB Smear Results No acid fast bacilli seen.    AFB Culture [[id number]]     Order Status:  No result Specimen:  Tissue from Right Hip     Fungal Culture [[id number]]     Order Status:  No result Specimen:  Tissue from Right Hip           Assessment:  Active Hospital Problems    Diagnosis   • *Brain lesion [G93.9]   • Granulomatous disease  [L92.9]   • Abscess of right iliac muscle and right gluteus medius muscle [L02.91]   • Pseudoaneurysm following procedure (HCC) [I97.89, I72.9]   • Sepsis (HCC) [A41.9]   • History of breast cancer [Z85.3]       Assessment/Plan:  Encephalopathy, intermittent waxing and waning     Brain lesions, worse on MRI  Query CNS fungal infection vs mycobacterial or other   MRI 4/23 \"supra and infratentorial brain parenchyma. Decrease in the size of the lesions. Interval reduction in the extent of the surrounding white matter edema consistent with improvement/treatment response of the most of the multifocal brain abscesses.  MRI 5/21 showed innumerable microabscesses vs mets  Abx (vancomycin, cefepime and Flagyl) discontinued on 5/23 after ~8 weeks of treatment-developed new fevers on " 6/4  MRI on 6/8 with interval progression of supra and infratentorial intra--axial nodules and associated edema.  New right thalamus lesion. Radiology favors infection over neoplasm though both remain considerations (had been off abx)  Mult blood cultures neg  CHAS neg 3/15 and 5/24  MRI 6/22 worsening CNS lesions  S/p right craniotomy and resection of mass with biopsy on 6/28. Biopsy-per note fungal appearing elements seen per Neurosurgery-discussed with pathologist who examined the frozen section and there were no fungal appearing elements.   +necrotizing granulomas. All stains negative in EPIC  Fungal culture- negative to date  Bacterial cultures-negative to date  AFB culture-in progress  Pathology- focal necrotizing granulomatous inflammation; no fungal elements or acid-fast bacilli on stains.  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  Brucella ab - negative  Coxiella ab - negative   Histo ab serum & antigen urine-negative  Blasto ab- negative & antigen urine - pending       --- Discussed MRI findings with radiologist and there is easily obtained tissue/fluid in the sacrum and area that has been worsening despite therapy, per radiology images most consistent to TB or Brucellosis  -Pelvic biopsy on 7/11 - fluid drainage and specimen sent to [facility] again for AFB, bacterial and fungal, crypto and cocci PCR-confirmed with Alex this was sent out on  "7/12   Pathology  - No definite acid fast or fungal organisms are seen.     Gluteal and iliopsoas abscess   OM L5, S1-3, new this admission  Recurrent abscesses  Adjacent to hardware in right hip (placed 12/17/18)  CT 4/23 \"Fluid collections within the right gluteal and iliacis muscles.. The collection within the right gluteal muscle has unchanged while the fluid collection within the right iliacis muscle is increased somewhat in size.\" 2.7 cm  Cultures 3/29 and 4/4 neg  MRI on 5/20/2019 - interval decrease in collection in R gluteal muscle and persistent multiloculated collection in R iliacus muscle.   CT 5/28 no change  s/p drainage on 5/30/2019-cultures negative  S/p removal hardware 6/5-no cultures done  1, 3 beta glucan neg  CT on 6/8 with residual abscess in the right iliacus muscle, 2.5 x 1.8 cm, slightly smaller than prior  s/p CT-guided deep bone biopsy 6/19/2019.  Cultures remain negative; path not reported   MRI 6/28 chronic discitis, diffuse OM L5, 3 and 4 cm abscesses right glute, 1.5 cm abscess or necrosis right posterior ileum, 3.3 cm abscess right SI joint  Continue TB therapy     Right gluteal pseudoaneurysm  significant interval enlargement noted by IR during procedure on 7/11  -CTA planned      +QuantiGold  Query disseminated TB  Path with necrotizing granulomas  AFB stain neg  AFB cultures pending  All prior AFB cxs still neg  Started RIPE +B6 7/3   Monitor for visual changes while on ethambutol - please ask if ophthalmology can get baseline vision as well as color vision testing done while inpatient     Type 2 DM  Hemoglobin A1c 6.3   Keep BS under 150 to help control current infection    Do not recommend transfer to outside facility until outside testing is complete and there is a plan of care.     I have performed a physical exam and reviewed and updated ROS and plan today 7/16/2019.  In review of yesterday's note 7/15/2019, there are no changes except as documented above.    Discussed with " IM

## 2019-07-16 NOTE — PROGRESS NOTES
2 RN skin check completed.      -Sacrum pink and blanching.   -BLE dusky and dry.  -Heels pink/red and blanching, bruising noted.   -Right head incision from crani site.   -Site CDI, SHAZIA.   -Pt able to reposition self .   -Barrier wipes and cream.

## 2019-07-16 NOTE — PROGRESS NOTES
Pt more alert today, AAOx3, intermittent confusion.    Reorienting pt as needed.   Pt complaining of pain, medicated per MAR.   Voiding per bedpan. Encouraged to mobilize more.   Crani site CDI.   Fall precautions in place.   Hourly rounding, will continue to monitor.

## 2019-07-16 NOTE — THERAPY
"Physical Therapy Treatment completed.   Bed Mobility:  Supine to Sit: Supervised (HOB slightly elevated and use of the rail)  Transfers: Sit to Stand: Minimal Assist (from EOB->FWW)  Gait: Level Of Assist: Minimal Assist with Front-Wheel Walker       Plan of Care: Will benefit from Physical Therapy 3 times per week  Discharge Recommendations: Equipment: Will Continue to Assess for Equipment Needs. Post-acute therapy Discharge to a transitional care facility for continued skilled therapy services.     See \"Rehab Therapy-Acute\" Patient Summary Report for complete documentation.       "

## 2019-07-16 NOTE — PROGRESS NOTES
Pt AAOx2, intermittent confusion.   Speaking English and [place].   Reorienting pt as needed.   Pt complaining of pain, medicated per MAR.   Voiding per bedpan.   Crani site CDI. Staples removed today.   Brother at bedside, informed of POC.   Fall precautions in place.   Hourly rounding, will continue to monitor.

## 2019-07-16 NOTE — CARE PLAN
Problem: Discharge Barriers/Planning  Goal: Patient's continuum of care needs will be met  Outcome: PROGRESSING SLOWER THAN EXPECTED  POC discussed with pt in-depth at beside with MD and bedside RN. Will dc to [facility] when medically clear.     Problem: Mobility  Goal: Risk for activity intolerance will decrease  Outcome: PROGRESSING SLOWER THAN EXPECTED  Pt able to ambulate to chair w/FWW and participate with therapy today. Pt educated on importance of mobilizing. Will continue to reinforce.

## 2019-07-16 NOTE — PROGRESS NOTES
Hospital Medicine Daily Progress Note    Date of Service  7/15/2019    Chief Complaint    68 y.o. female admitted 5/29/2019 with pelvic pain and confusion .     Hospital Course    68 y.o. female transferred from [facility] on 5/29/2019 with persistent right gluteal abscesses since sacral fracture repair in December with multiple IR drainage.     Interval imaging to date:  MRI brain showed increase in brain lesions  CHAS done by Dr. Potter showed no vegetations.  MRI brain on 6/7/19 saw progression of the supra and infratentorial intra-axial nodules with edema, prompting a Neurosurgery consult.     Repeat CT on 6/17 showed no significant interval change in the size of the right iliacus muscle fluid collections, with changes in the right iliac bone, bilateral sacrum, and left iliac bone suspicious for osteomyelitis, and changes at L5-S1 suspicious for discitis/osteomyelitis, along with hyperdense right gluteal lesion, moderately suspicious for postoperative pseudoaneurysm with adjacent hematoma.      Cultures:  Pelvic abscess drainage on 5/30 with negative results.  Biopsy of the hip showed no AFB, organism, or fungal elements    Patient had re evaluation by Dr. Nguyen and she underwent right-sided craniotomy with resection of right sided superficial mass on 6/28/2019.  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  Patient started on IV Amphotericin.  Path +focal necrotizing granulomatous inflammation with a marked histiocytic infiltrate.    Interval Problem Update  Afebrile, improved mentation, alert and does not look in pain  Had IR guided bx of abscesses with extensive lab workup planned, all those cultures so far neg  Brucellosis negative so far  Previously had negative cultures but +ve Quant so on RIPE.   Increased gabapentin with crosstitration of pramipexole, seems to have worked well  Message sent to Dr Haas (opho) regarding vision testing request while inpatient and on ethambutol  Neutrophil function  NORMAL, r/o chronic granulomatous dz.   D/W Hinojosa ID about stopping ampho, MRI ordered, now looking WORSE:    1.  Innumerable nodular enhancing lesions throughout the brain parenchyma both the supra and infratentorial compartments predominantly near the gray-white junction. There has been interval increase in size of several of these lesions since previous exam.   These changes may be secondary to metastatic disease or granulomatous disease.  2.  Interval right parietal craniotomy with underlying elliptical postsurgical defect.  3.  Interval increase in size of index right thalamic lesion which has increased vasogenic edema since previous exam.  4.  Age-related cerebral atrophy.    Stopping ampho  Awaiting  labs  High complexity patient    Consultants/Specialty  Neurosurgery, Dr. Nguyen  Pulmonary Dr. Salinas  Infectious disease  IR    Code Status  DNR/DNI    Disposition  Unclear  High complexity  Remains inpatient  Anticipate SNF when more stable    Review of Systems  Review of Systems   Constitutional: Positive for malaise/fatigue. Negative for chills and fever.   Eyes: Negative for blurred vision.   Respiratory: Negative for cough and shortness of breath.    Cardiovascular: Negative for chest pain.   Gastrointestinal: Negative for heartburn and nausea.   Musculoskeletal: Positive for myalgias.   Skin: Negative for itching and rash.   Neurological: Positive for tremors and weakness. Negative for tingling and sensory change.   Psychiatric/Behavioral: The patient is nervous/anxious.    All other systems reviewed and are negative.    Physical Exam  Temp:  [36.2 °C (97.1 °F)-37.1 °C (98.7 °F)] 36.6 °C (97.9 °F)  Pulse:  [77-94] 94  Resp:  [15-17] 16  BP: (150-168)/(68-89) 150/74  SpO2:  [90 %-95 %] 94 %    Physical Exam   Constitutional: She is oriented to person, place, and time. No distress.   HENT:   Head: Normocephalic and atraumatic.   Mouth/Throat: Oropharynx is clear and moist.   Right cranio site present with  staples   Eyes: Conjunctivae and EOM are normal. Right eye exhibits no discharge. Left eye exhibits no discharge.   Neck: Normal range of motion. Neck supple.   Cardiovascular: Normal rate and intact distal pulses.    No murmur heard.  Pulmonary/Chest: No stridor. No respiratory distress. She has no wheezes. She has no rales.   Abdominal: Soft. She exhibits no distension. There is no tenderness. There is no rebound.   Musculoskeletal: She exhibits no edema or tenderness.   Generalized 4/5 strength.  Constant movement LEs at ankles and knees, improving  Less tremor   Neurological: She is alert and oriented to person, place, and time. Coordination abnormal.   No focal weakness  Consistent RLS type movement LEs  No TTP  More alert today   Skin: Skin is warm and dry. She is not diaphoretic. No pallor.   Brawny induration LEs   Psychiatric:   Anxious but improved   Vitals reviewed.    Fluids    Intake/Output Summary (Last 24 hours) at 07/15/19 1801  Last data filed at 07/15/19 0800   Gross per 24 hour   Intake              780 ml   Output                0 ml   Net              780 ml       Laboratory  Recent Labs      07/13/19   0522  07/14/19   0324  07/15/19   0345   WBC  5.9  5.1  4.4*   RBC  3.44*  3.32*  3.16*   HEMOGLOBIN  9.9*  9.7*  8.9*   HEMATOCRIT  35.4*  31.1*  29.6*   MCV  102.9*  93.7  93.7   MCH  28.8  29.2  28.2   MCHC  28.0*  31.2*  30.1*   RDW  64.1*  55.1*  55.8*   PLATELETCT  203  200  190   MPV  9.3  9.0  9.0     Recent Labs      07/13/19 0522 07/14/19 0324  07/15/19   0345   SODIUM  137  139  137   POTASSIUM  4.5  3.9  4.2   CHLORIDE  111  109  108   CO2  16*  22  22   GLUCOSE  102*  94  87   BUN  13  6*  10   CREATININE  0.71  0.54  0.61   CALCIUM  10.0  9.6  9.2                 Assessment/Plan  * Brain lesion- (present on admission)   Assessment & Plan    With right gluteal, multiloculated right iliac muscle fluid collections.     CHAS neg 3/15 and 5/24.    Cultures 3/29 and 4/4 neg  She has  completed multiple courses of IV antibiotics  s/p drainage on 5/30/2019, cultures negative  s/p removal hardware  1, 3 beta glucan neg.      CT on 6/8 with residual abscess in the right iliacus muscle, 2.5 x 1.8 cm, slightly smaller than prior  s/p CT-guided deep bone biopsy 6/19/2019.  Cultures remain negative; path not reported     MRI 6/28 chronic discitis, diffuse OM L5, 3 and 4 cm abscesses right glute, 1.5 cm abscess or necrosis right psoterior ileum, 3.3 cm abscess right SI joint.    Status post brain biopsy - 6-28-19   Q gold +   Brain biopsy + positive focal necrotizing granulomas-may suggest mycobacterial infection. ? Fungal infxn, other cause.    Continue IV amphotericin per ID.  Has had electrolyte wasting with recurrent hypomagnesemia.  Continue schedule KCl and magnesium supplements  Monitor electrolytes closely  Continue Keppra for sz prophylaxis.     Patient with recurrent abscesses with progression, osteomyelitis and granulomas-  cultures have been negative  NORMAL neutrophil function test.     Repeat MRI:  1.  Innumerable nodular enhancing lesions throughout the brain parenchyma both the supra and infratentorial compartments predominantly near the gray-white junction. There has been interval increase in size of several of these lesions since previous exam.   These changes may be secondary to metastatic disease or granulomatous disease.    2.  Interval right parietal craniotomy with underlying elliptical postsurgical defect.    3.  Interval increase in size of index right thalamic lesion which has increased vasogenic edema since previous exam.    4.  Age-related cerebral atrophy.    Stopping Ampho - clearly not fungal  Contacted Dr Haas for vision testing on ethambutol      Granulomatous disease    Assessment & Plan    Cultures negative with recurrent, progressive abscesses, OM.  Unclear cause.  Continue treatment for possible infectious causes as above.      NORMAL Neutrophil function test-  dihydrorhodamine 123 screening test.      Abscess of right iliac muscle and right gluteus medius muscle- (present on admission)   Assessment & Plan    Recurrent issue since sacral fracture repair in December 2018. Has had multiple IR drainage and antibiotics. Recent cultures remain unrevealing.    s/p biopsy of the nonhealing right iliac fracture area. Showed no AFB, organism, or fungal elements.  Cultures negative.  Pain control.  Increased OxyContin, still uncontrolled and increased PRN morphine dose    Continue Lidoderm patch, PRN oxycodone, IV morphine for severe breakthrough pain  Continue with  RI PE w B6 and amphotericin per ID.       Sepsis (HCC)   Assessment & Plan    This is sepsis (without associated acute organ dysfunction).    Sepsis resolved  Continue amphotericin , RIPE started.  Monitor vital signs     Hypomagnesemia- (present on admission)   Assessment & Plan    Low again  Give IV 2gm  Monitor electrolytes     Hypercalcemia- (present on admission)   Assessment & Plan    Has resolved  Continue to monitor calcium     RLS (restless legs syndrome)- (present on admission)   Assessment & Plan    Appears to have augmented   Giving her severe distress, no sleep  Cross titrate with incr dose gabapentin, start lowering pramipexole dose  Working well     Acquired circulating anticoagulants (HCC)- (present on admission)   Assessment & Plan    Anticoagulation on hold given recent brain biopsy.  Discussed with Dr. Nguyen- Ok to start AC per surgery .  US legs negative for DVT  Start DVT pplx     Non-severe protein-calorie malnutrition (HCC)- (present on admission)   Assessment & Plan    Advance diet as tolerated  Dietary supplements       History of DVT (deep vein thrombosis)- (present on admission)   Assessment & Plan    Hold anticoagulation given recent brain biopsy and lumbar osteomyelitis.  LE duplex negative     Essential hypertension- (present on admission)   Assessment & Plan    Continue metoprolol  losartan and amlodipine  Monitor blood pressure     Chronic hyponatremia- (present on admission)   Assessment & Plan    Recurrent  Continue to monitor Na.     Chronic pain- (present on admission)   Assessment & Plan    Continue pain management       History of breast cancer- (present on admission)   Assessment & Plan    S/p left mastectomy and breast implant.  Follow-up on intraoperative final pathology results     COPD (chronic obstructive pulmonary disease) (HCC)- (present on admission)   Assessment & Plan    Stable   RT protocol and oxygen        Discussed with multi disciplinary team plan of care. Discussed with Dr Hinojosa, ID.     VTE prophylaxis: SCDs, heparin.

## 2019-07-17 PROBLEM — M86.9 OSTEOMYELITIS (HCC): Status: ACTIVE | Noted: 2019-07-17

## 2019-07-17 PROBLEM — E87.6 HYPOKALEMIA: Status: ACTIVE | Noted: 2019-07-17

## 2019-07-17 LAB
ANION GAP SERPL CALC-SCNC: 7 MMOL/L (ref 0–11.9)
BUN SERPL-MCNC: 9 MG/DL (ref 8–22)
CALCIUM SERPL-MCNC: 9.7 MG/DL (ref 8.5–10.5)
CHLORIDE SERPL-SCNC: 106 MMOL/L (ref 96–112)
CO2 SERPL-SCNC: 24 MMOL/L (ref 20–33)
CREAT SERPL-MCNC: 0.71 MG/DL (ref 0.5–1.4)
ERYTHROCYTE [DISTWIDTH] IN BLOOD BY AUTOMATED COUNT: 55.7 FL (ref 35.9–50)
GLUCOSE SERPL-MCNC: 89 MG/DL (ref 65–99)
HCT VFR BLD AUTO: 32.1 % (ref 37–47)
HGB BLD-MCNC: 10 G/DL (ref 12–16)
MCH RBC QN AUTO: 29.2 PG (ref 27–33)
MCHC RBC AUTO-ENTMCNC: 31.2 G/DL (ref 33.6–35)
MCV RBC AUTO: 93.9 FL (ref 81.4–97.8)
PLATELET # BLD AUTO: 274 K/UL (ref 164–446)
PMV BLD AUTO: 8.9 FL (ref 9–12.9)
POTASSIUM SERPL-SCNC: 4.3 MMOL/L (ref 3.6–5.5)
RBC # BLD AUTO: 3.42 M/UL (ref 4.2–5.4)
SODIUM SERPL-SCNC: 137 MMOL/L (ref 135–145)
WBC # BLD AUTO: 4.9 K/UL (ref 4.8–10.8)

## 2019-07-17 PROCEDURE — A9270 NON-COVERED ITEM OR SERVICE: HCPCS | Performed by: INTERNAL MEDICINE

## 2019-07-17 PROCEDURE — 99232 SBSQ HOSP IP/OBS MODERATE 35: CPT | Performed by: INTERNAL MEDICINE

## 2019-07-17 PROCEDURE — A9270 NON-COVERED ITEM OR SERVICE: HCPCS | Performed by: HOSPITALIST

## 2019-07-17 PROCEDURE — A9270 NON-COVERED ITEM OR SERVICE: HCPCS | Performed by: FAMILY MEDICINE

## 2019-07-17 PROCEDURE — 700111 HCHG RX REV CODE 636 W/ 250 OVERRIDE (IP): Performed by: HOSPITALIST

## 2019-07-17 PROCEDURE — 700102 HCHG RX REV CODE 250 W/ 637 OVERRIDE(OP): Performed by: INTERNAL MEDICINE

## 2019-07-17 PROCEDURE — 700102 HCHG RX REV CODE 250 W/ 637 OVERRIDE(OP): Performed by: FAMILY MEDICINE

## 2019-07-17 PROCEDURE — 700111 HCHG RX REV CODE 636 W/ 250 OVERRIDE (IP): Performed by: FAMILY MEDICINE

## 2019-07-17 PROCEDURE — 700102 HCHG RX REV CODE 250 W/ 637 OVERRIDE(OP): Performed by: HOSPITALIST

## 2019-07-17 PROCEDURE — 700101 HCHG RX REV CODE 250: Performed by: FAMILY MEDICINE

## 2019-07-17 PROCEDURE — 99232 SBSQ HOSP IP/OBS MODERATE 35: CPT | Performed by: FAMILY MEDICINE

## 2019-07-17 PROCEDURE — 770006 HCHG ROOM/CARE - MED/SURG/GYN SEMI*

## 2019-07-17 PROCEDURE — 85027 COMPLETE CBC AUTOMATED: CPT

## 2019-07-17 PROCEDURE — 80048 BASIC METABOLIC PNL TOTAL CA: CPT

## 2019-07-17 RX ADMIN — ETHAMBUTOL HYDROCHLORIDE 800 MG: 400 TABLET, FILM COATED ORAL at 05:07

## 2019-07-17 RX ADMIN — LOSARTAN POTASSIUM 75 MG: 50 TABLET ORAL at 05:08

## 2019-07-17 RX ADMIN — RIFAMPIN 300 MG: 300 CAPSULE ORAL at 05:09

## 2019-07-17 RX ADMIN — POTASSIUM CHLORIDE 40 MEQ: 20 TABLET, EXTENDED RELEASE ORAL at 05:09

## 2019-07-17 RX ADMIN — PRAMIPEXOLE DIHYDROCHLORIDE 0.25 MG: 0.5 TABLET ORAL at 05:07

## 2019-07-17 RX ADMIN — HEPARIN SODIUM 5000 UNITS: 5000 INJECTION, SOLUTION INTRAVENOUS; SUBCUTANEOUS at 05:07

## 2019-07-17 RX ADMIN — GABAPENTIN 400 MG: 400 CAPSULE ORAL at 05:08

## 2019-07-17 RX ADMIN — RIFAMPIN 300 MG: 300 CAPSULE ORAL at 17:38

## 2019-07-17 RX ADMIN — METOPROLOL TARTRATE 25 MG: 25 TABLET, FILM COATED ORAL at 17:38

## 2019-07-17 RX ADMIN — OMEPRAZOLE 20 MG: 20 CAPSULE, DELAYED RELEASE ORAL at 05:09

## 2019-07-17 RX ADMIN — HEPARIN SODIUM 5000 UNITS: 5000 INJECTION, SOLUTION INTRAVENOUS; SUBCUTANEOUS at 17:39

## 2019-07-17 RX ADMIN — OXYCODONE HYDROCHLORIDE 20 MG: 20 TABLET, FILM COATED, EXTENDED RELEASE ORAL at 17:38

## 2019-07-17 RX ADMIN — METOPROLOL TARTRATE 25 MG: 25 TABLET, FILM COATED ORAL at 05:08

## 2019-07-17 RX ADMIN — PYRIDOXINE HCL TAB 50 MG 100 MG: 50 TAB at 05:07

## 2019-07-17 RX ADMIN — CYCLOBENZAPRINE 10 MG: 10 TABLET, FILM COATED ORAL at 14:53

## 2019-07-17 RX ADMIN — Medication 1 CAPSULE: at 08:30

## 2019-07-17 RX ADMIN — PRAMIPEXOLE DIHYDROCHLORIDE 0.25 MG: 0.5 TABLET ORAL at 11:36

## 2019-07-17 RX ADMIN — ATORVASTATIN CALCIUM 10 MG: 10 TABLET, FILM COATED ORAL at 17:39

## 2019-07-17 RX ADMIN — CINACALCET HYDROCHLORIDE 60 MG: 30 TABLET, COATED ORAL at 05:07

## 2019-07-17 RX ADMIN — SUCRALFATE 1 G: 1 SUSPENSION ORAL at 05:07

## 2019-07-17 RX ADMIN — OXYCODONE HYDROCHLORIDE 20 MG: 20 TABLET, FILM COATED, EXTENDED RELEASE ORAL at 05:08

## 2019-07-17 RX ADMIN — SUCRALFATE 1 G: 1 SUSPENSION ORAL at 17:38

## 2019-07-17 RX ADMIN — SENNOSIDES, DOCUSATE SODIUM 2 TABLET: 50; 8.6 TABLET, FILM COATED ORAL at 05:07

## 2019-07-17 RX ADMIN — ACETAMINOPHEN 1000 MG: 500 TABLET ORAL at 17:38

## 2019-07-17 RX ADMIN — PRAMIPEXOLE DIHYDROCHLORIDE 0.25 MG: 0.5 TABLET ORAL at 17:39

## 2019-07-17 RX ADMIN — ISONIAZID 300 MG: 300 TABLET ORAL at 05:08

## 2019-07-17 RX ADMIN — ACETAMINOPHEN 1000 MG: 500 TABLET ORAL at 05:08

## 2019-07-17 RX ADMIN — FERROUS SULFATE TAB 325 MG (65 MG ELEMENTAL FE) 325 MG: 325 (65 FE) TAB at 08:30

## 2019-07-17 RX ADMIN — ONDANSETRON 4 MG: 4 TABLET, ORALLY DISINTEGRATING ORAL at 05:07

## 2019-07-17 RX ADMIN — PYRAZINAMIDE 1000 MG: 500 TABLET ORAL at 05:07

## 2019-07-17 RX ADMIN — GABAPENTIN 400 MG: 400 CAPSULE ORAL at 17:38

## 2019-07-17 RX ADMIN — SENNOSIDES, DOCUSATE SODIUM 2 TABLET: 50; 8.6 TABLET, FILM COATED ORAL at 17:38

## 2019-07-17 RX ADMIN — LEVETIRACETAM 500 MG: 500 TABLET, FILM COATED ORAL at 17:38

## 2019-07-17 RX ADMIN — MAGNESIUM 64 MG (MAGNESIUM CHLORIDE) TABLET,DELAYED RELEASE 128 MG: at 17:38

## 2019-07-17 RX ADMIN — LIDOCAINE 2 PATCH: 50 PATCH CUTANEOUS at 11:36

## 2019-07-17 RX ADMIN — GABAPENTIN 400 MG: 400 CAPSULE ORAL at 11:36

## 2019-07-17 RX ADMIN — LEVETIRACETAM 500 MG: 500 TABLET, FILM COATED ORAL at 05:09

## 2019-07-17 RX ADMIN — POTASSIUM CHLORIDE 40 MEQ: 20 TABLET, EXTENDED RELEASE ORAL at 17:38

## 2019-07-17 RX ADMIN — SUCRALFATE 1 G: 1 SUSPENSION ORAL at 11:36

## 2019-07-17 RX ADMIN — ONDANSETRON 4 MG: 2 INJECTION INTRAMUSCULAR; INTRAVENOUS at 17:36

## 2019-07-17 RX ADMIN — MAGNESIUM 64 MG (MAGNESIUM CHLORIDE) TABLET,DELAYED RELEASE 128 MG: at 05:07

## 2019-07-17 RX ADMIN — AMLODIPINE BESYLATE 10 MG: 5 TABLET ORAL at 05:07

## 2019-07-17 ASSESSMENT — ENCOUNTER SYMPTOMS
WHEEZING: 0
SHORTNESS OF BREATH: 0
FEVER: 0
VOMITING: 0
NERVOUS/ANXIOUS: 0
SENSORY CHANGE: 0
DIARRHEA: 0
PALPITATIONS: 0
BLURRED VISION: 0
NECK PAIN: 0
NAUSEA: 0
DIZZINESS: 0
FOCAL WEAKNESS: 0
WEAKNESS: 0
HEADACHES: 0
CHILLS: 0
COUGH: 0
BACK PAIN: 0
SPEECH CHANGE: 0
ABDOMINAL PAIN: 0
HEARTBURN: 0
SORE THROAT: 0

## 2019-07-17 NOTE — PROGRESS NOTES
[facility] Medicine Daily Progress Note    Date of Service  7/16/2019    Chief Complaint    68 y.o. female admitted 5/29/2019 with pelvic pain and confusion .     Hospital Course    68 y.o. female transferred from [facility] on 5/29/2019 with persistent right gluteal abscesses since sacral fracture repair in December with multiple IR drainage.     Interval imaging to date:  MRI brain showed increase in brain lesions  CHAS done by Dr. Potter showed no vegetations.  MRI brain on 6/7/19 saw progression of the supra and infratentorial intra-axial nodules with edema, prompting a Neurosurgery consult.     Repeat CT on 6/17 showed no significant interval change in the size of the right iliacus muscle fluid collections, with changes in the right iliac bone, bilateral sacrum, and left iliac bone suspicious for osteomyelitis, and changes at L5-S1 suspicious for discitis/osteomyelitis, along with hyperdense right gluteal lesion, moderately suspicious for postoperative pseudoaneurysm with adjacent hematoma.      Cultures:  Pelvic abscess drainage on 5/30 with negative results.  Biopsy of the hip showed no AFB, organism, or fungal elements    Patient had re evaluation by Dr. Nguyen and she underwent right-sided craniotomy with resection of right sided superficial mass on 6/28/2019.  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  Patient started on IV Amphotericin.  Path +focal necrotizing granulomatous inflammation with a marked histiocytic infiltrate.    Interval Problem Update  Resting in bed, no new complains, no fever or chills, no diaphoresis, discussed with nurse staff regarding changing AM meds to after breakfast since is causing epigastric discomfort.     Consultants/Specialty  Neurosurgery, Dr. Nguyen    Pulmonary   Infectious disease  IR    Code Status  DNR/DNI    Disposition  Unclear  High complexity  Remains inpatient  Anticipate SNF when more stable    Review of Systems  Review of Systems   Constitutional: Negative  for fever and malaise/fatigue.   Eyes: Negative for blurred vision.   Respiratory: Negative for cough.    Cardiovascular: Negative for chest pain.   Gastrointestinal: Negative for heartburn and nausea.   Musculoskeletal: Negative for myalgias and neck pain.   Skin: Negative for itching and rash.   Neurological: Positive for tremors and weakness. Negative for tingling and sensory change.   Psychiatric/Behavioral: The patient is nervous/anxious.      Physical Exam  Temp:  [36.6 °C (97.9 °F)-37.1 °C (98.8 °F)] 36.6 °C (97.9 °F)  Pulse:  [] 103  Resp:  [16-19] 19  BP: (132-194)/(66-89) 164/86  SpO2:  [89 %-98 %] 96 %    Physical Exam   Constitutional: She is oriented to person, place, and time. She appears well-nourished. No distress.   HENT:   Head: Normocephalic and atraumatic.   Eyes: Conjunctivae are normal.   Neck: Normal range of motion. Neck supple.   Cardiovascular: Normal rate and intact distal pulses.    No murmur heard.  Pulmonary/Chest: No respiratory distress. She has no wheezes.   Abdominal: Soft. She exhibits no distension. There is no tenderness.   Musculoskeletal: She exhibits no edema or tenderness.   Generalized 4/5 strength.     Lymphadenopathy:     She has no cervical adenopathy.   Neurological: She is alert and oriented to person, place, and time. She exhibits normal muscle tone. Coordination abnormal.   Skin: Skin is warm and dry.   Psychiatric: She has a normal mood and affect.   Vitals reviewed.    Fluids    Intake/Output Summary (Last 24 hours) at 07/16/19 1727  Last data filed at 07/16/19 0523   Gross per 24 hour   Intake             1116 ml   Output                0 ml   Net             1116 ml       Laboratory  Recent Labs      07/14/19   0324  07/15/19   0345   WBC  5.1  4.4*   RBC  3.32*  3.16*   HEMOGLOBIN  9.7*  8.9*   HEMATOCRIT  31.1*  29.6*   MCV  93.7  93.7   MCH  29.2  28.2   MCHC  31.2*  30.1*   RDW  55.1*  55.8*   PLATELETCT  200  190   MPV  9.0  9.0     Recent Labs       07/14/19   0324  07/15/19   0345   SODIUM  139  137   POTASSIUM  3.9  4.2   CHLORIDE  109  108   CO2  22  22   GLUCOSE  94  87   BUN  6*  10   CREATININE  0.54  0.61   CALCIUM  9.6  9.2                 Assessment/Plan  * Brain lesion- (present on admission)   Assessment & Plan    With right gluteal, multiloculated right iliac muscle fluid collections.     CHAS neg 3/15 and 5/24.    Cultures 3/29 and 4/4 neg  She has completed multiple courses of IV antibiotics  s/p drainage on 5/30/2019, cultures negative  s/p removal hardware  1, 3 beta glucan neg.      CT on 6/8 with residual abscess in the right iliacus muscle, 2.5 x 1.8 cm, slightly smaller than prior  s/p CT-guided deep bone biopsy 6/19/2019.  Cultures remain negative; path not reported     MRI 6/28 chronic discitis, diffuse OM L5, 3 and 4 cm abscesses right glute, 1.5 cm abscess or necrosis right psoterior ileum, 3.3 cm abscess right SI joint.    Status post brain biopsy - 6-28-19   Q gold +   Brain biopsy + positive focal necrotizing granulomas-may suggest mycobacterial infection. ? Fungal infxn, other cause.    Continue IV amphotericin per ID.  Has had electrolyte wasting with recurrent hypomagnesemia.  Continue schedule KCl and magnesium supplements  Monitor electrolytes closely  Continue Keppra for sz prophylaxis.     Patient with recurrent abscesses with progression, osteomyelitis and granulomas-  cultures have been negative  NORMAL neutrophil function test.     Repeat MRI:  1.  Innumerable nodular enhancing lesions throughout the brain parenchyma both the supra and infratentorial compartments predominantly near the gray-white junction. There has been interval increase in size of several of these lesions since previous exam.   These changes may be secondary to metastatic disease or granulomatous disease.    2.  Interval right parietal craniotomy with underlying elliptical postsurgical defect.    3.  Interval increase in size of index right thalamic lesion  which has increased vasogenic edema since previous exam.    4.  Age-related cerebral atrophy.    Stopping Ampho - clearly not fungal  Contacted  Dr Haas for vision testing on ethambutol by previous hospitalist.     Granulomatous disease    Assessment & Plan    Cultures negative with recurrent, progressive abscesses, OM.  Unclear cause.  Continue treatment for possible infectious causes as above.      NORMAL Neutrophil function test- dihydrorhodamine 123 screening test.   Stable.      Abscess of right iliac muscle and right gluteus medius muscle- (present on admission)   Assessment & Plan    Recurrent issue since sacral fracture repair in December 2018. Has had multiple IR drainage and antibiotics. Recent cultures remain unrevealing.    s/p biopsy of the nonhealing right iliac fracture area. Showed no AFB, organism, or fungal elements.  Cultures negative.  Pain control.  Increased OxyContin, still uncontrolled and increased PRN morphine dose    Continue Lidoderm patch, PRN oxycodone, IV morphine for severe breakthrough pain  Continue with  RI PE w B6 and amphotericin per ID.    stable     Sepsis (HCC)   Assessment & Plan    This is sepsis (without associated acute organ dysfunction).    Sepsis resolved  Continue amphotericin , RIPE started.  Monitor vital signs     Hypomagnesemia- (present on admission)   Assessment & Plan    Improved, continue Mg po     Hypercalcemia- (present on admission)   Assessment & Plan    Has resolved  Continue to monitor calcium     RLS (restless legs syndrome)- (present on admission)   Assessment & Plan    Appears to have augmented   Giving her severe distress, no sleep  Cross titrate with incr dose gabapentin, start lowering pramipexole dose  Stable      Acquired circulating anticoagulants (HCC)- (present on admission)   Assessment & Plan    Anticoagulation on hold given recent brain biopsy.  Discussed with Dr. Nguyen- Ok to start AC per surgery .  US legs negative for DVT  Start DVT  pplx     Non-severe protein-calorie malnutrition (HCC)- (present on admission)   Assessment & Plan    Advance diet as tolerated  Dietary supplements       History of DVT (deep vein thrombosis)- (present on admission)   Assessment & Plan    Hold anticoagulation given recent brain biopsy and lumbar osteomyelitis.  LE duplex negative     Essential hypertension- (present on admission)   Assessment & Plan    Continue metoprolol losartan and amlodipine  Monitor blood pressure     Chronic hyponatremia- (present on admission)   Assessment & Plan    Recurrent  Continue to monitor Na.  Stable      Chronic pain- (present on admission)   Assessment & Plan    Continue pain management       History of breast cancer- (present on admission)   Assessment & Plan    S/p left mastectomy and breast implant.  Follow-up on intraoperative final pathology results     COPD (chronic obstructive pulmonary disease) (HCC)- (present on admission)   Assessment & Plan    Stable   RT protocol and oxygen            VTE prophylaxis: SCDs, heparin.

## 2019-07-17 NOTE — THERAPY
"Occupational Therapy Treatment completed with focus on ADLs and ADL transfers.  Functional Status:    Pt resting in bed when received by OT, agreeable to session. She completed LB dressing in supine with supv and encouragement. Mobilized to EOB with raised HOB supv. Once sitting upright, she started to grunt with pain. STS Min A, ambulating with FWW, requiring assistance to navigate FWW. Pt ambulated to the bathroom and completed toilet xfer with Min A, pericare with Max A. Following toileting, she walked back to EOB and required Min A B2B for BLEs. Pt was initially alert and appropriate when she was lying down, however once she stood up, she appeared to become more confused and required more cuing, decreased attention may have been associated with pain. Will continue to follow.     Plan of Care: Will benefit from Occupational Therapy 3 times per week  Discharge Recommendations:  Equipment Will Continue to Assess for Equipment Needs. Post-acute therapy Recommend post-acute placement for additional occupational therapy services prior to discharge home. Patient can tolerate post-acute therapies at a 5x/week frequency.      See \"Rehab Therapy-Acute\" Patient Summary Report for complete documentation.   "

## 2019-07-17 NOTE — PROGRESS NOTES
2 RN skin check completed.      -Sacrum pink and blanching.   -BLE dusky and dry.  -Heels pink/red and blanching, bruising noted.   -Mepilex in place to left heel.  -Right head incision from crani site.   -Site CDI, SHAZIA.   -Pt able to reposition self .   -Barrier wipes and cream

## 2019-07-17 NOTE — DISCHARGE PLANNING
Anticipated Discharge Disposition: TBD     Action: LSW received a phone call from Darlyn (ext [phone]) with [facility]. She stated that she was going to be assessing pt for medical. LSW explained she was oriented x3 and pt's brother Delonte would be the best contact. LSW provided Delonte's telephone number.     Barriers to Discharge: None    Plan: LSW to assist as needed.     Addendum 1132  LSW spoke to pt and discussed [facility] vs [place] once medically cleared. Pt stated that she would like to go to [facility] and after she discharges from SNF she would most likely move to [place] with her brother.     LSW called Darlyn with [facility] to update.     Addendum 1333  Darlyn assessed for Medical and states that assets too high and doesn't qualify. Darlyn would like to know if current health insurance will fully cover her stay at [facility].     Plan- LSW to follow up with [facility] and update Darlyn.     Addendum [id number]  LSW informed by [facility] that she is not sure if insurance would cover full stay [facility]. LSW called Darlyn to update.

## 2019-07-17 NOTE — CARE PLAN
Problem: Knowledge Deficit  Goal: Knowledge of disease process/condition, treatment plan, diagnostic tests, and medications will improve  Outcome: PROGRESSING AS EXPECTED  Discussed POC with bedside RN and MD. All questions/needs met.     Problem: Mobility  Goal: Risk for activity intolerance will decrease  Outcome: PROGRESSING SLOWER THAN EXPECTED  Encouraging ambulation and mobility. Pt is up x1 w/FWW.

## 2019-07-17 NOTE — PROGRESS NOTES
Infectious Disease Progress Note    Author: Ashely Hinojosa M.D. Date & Time of service: 7/17/2019  11:07 AM    Chief Complaint:  Brain abscess, gluteal abscess  Vertebral OM     Interval History:  68 y.o. female admitted 5/29/2019 as a transfer from [facility] since 4/30/2019. + diabetes, SANTOSH of right hip with hardware, and breast cancer who was originally admitted to [facility] [place] on 03/08/2019 for right hip and pelvic pain.  Work-up revealed a right iliopsoas lesion, gluteal abscess, and mult brain abscesses     7/4 AF much more alert and conversant today-c/o left hip pain, unrelenting and would like parietal dressing changed. HA at surgical site.  Denies SE abx. No new neurodeficits  7/5 afebrile WBC 5.4.  Patient sleeping but arousable.  She denies any headache, nausea, vomiting.  7/6 afebrile WBC 6.5.  Patient sitting up in chair and having excruciating back pain  7/8 Pt has no specific complaints, she is mildly confused today.  She seems to be tolerating her medications with no significant lab abnormalities.  7/9 AF, O2 RA, Significant left hip pain today.  She does appear to be tolerating her antibiotics.  Ultrasound of bilateral lower extremities has been ordered.  7/10 AF, O2 RA,  Plan for IR drainage of sacral collection soon.   7/11 AF, O2 RA,  Pt continued on RIPE and ampho.  She is tolerating well overall, requiring some mag and potassium replacement.  She is complaining of severe pain in left hip again today.  Plan for biopsy later today.   7/12  IR biopsy of R gluteal abscess/hip. AF, O2 2 L NC,   tolerating antibiotics so far.  She is quite somnolent today but per nursing she was oriented x4 earlier  7/14 AF, O2 RA, more alert today, conversant and asking for advance in diet. Left hip pain ongoing but improved.   7/15 afebrile WBC 4.4.  Patient's speech is somewhat muffled but she is alert and responding to questions appropriately.  MRI shows worsening lesions and amphotericin stopped.  Nurse reports  waxing and waning mental status   afebrile.  Patient sleeping but arousable.  She is answer questions appropriately but then forgets that she is in the hospital.   afebrile WBC 4.9 patient is very drowsy as she states she did not sleep well yesterday.  She is asking about whether or not she has a fungal infection.  Plan of care discussed with patient.    Review of Systems:  Review of Systems   Constitutional: Positive for malaise/fatigue. Negative for chills and fever.   Respiratory: Negative for shortness of breath.    Gastrointestinal: Negative for abdominal pain, nausea and vomiting.   Neurological: Negative for dizziness and headaches.       Hemodynamics:  Temp (24hrs), Av.5 °C (97.7 °F), Min:36.2 °C (97.1 °F), Max:36.7 °C (98 °F)  Temperature: 36.7 °C (98 °F)  Pulse  Av.5  Min: 49  Max: 195   Blood Pressure : 154/92       Physical Exam:  Physical Exam   Constitutional:   Elderly  Chronically ill-appearing   HENT:   Mouth/Throat: No oropharyngeal exudate.   Right parietal surgical site. Well approximated, no drainage or surrounding erythema.  Staples removed   Eyes: Pupils are equal, round, and reactive to light.   Neck: Neck supple.   Cardiovascular: Normal rate and regular rhythm.    Pulmonary/Chest: Effort normal. She has no wheezes. She has no rales.   Abdominal: Soft.   Musculoskeletal: She exhibits no edema.   Right upper extremity PICC line   Neurological:   Drowsy   Skin: She is not diaphoretic.       Meds:    Current Facility-Administered Medications:   •  losartan  •  pramipexole  •  senna-docusate  •  polyethylene glycol/lytes  •  magnesium hydroxide  •  gabapentin  •  ferrous sulfate  •  diphenhydrAMINE  •  morphine injection  •  heparin  •  oxyCODONE CR  •  potassium chloride SA  •  magnesium chloride  •  pyridoxine  •  levETIRAcetam  •  isoniazid  •  riFAMPin  •  ethambutol  •  pyrazinamide  •  acetaminophen  •  Pharmacy Consult Request  •  labetalol  •  hydrALAZINE  •  NS  •   lactobacillus rhamnosus  •  oxyCODONE immediate-release  •  sucralfate  •  acetaminophen  •  lidocaine  •  temazepam  •  cyclobenzaprine  •  Respiratory Care per Protocol  •  amLODIPine  •  cinacalcet  •  atorvastatin  •  omeprazole  •  metoprolol  •  ondansetron  •  ondansetron    Labs:  Recent Labs      07/15/19   0345  07/17/19   0509   WBC  4.4*  4.9   RBC  3.16*  3.42*   HEMOGLOBIN  8.9*  10.0*   HEMATOCRIT  29.6*  32.1*   MCV  93.7  93.9   MCH  28.2  29.2   RDW  55.8*  55.7*   PLATELETCT  190  274   MPV  9.0  8.9*     Recent Labs      07/15/19   0345  07/17/19   0300   SODIUM  137  137   POTASSIUM  4.2  4.3   CHLORIDE  108  106   CO2  22  24   GLUCOSE  87  89   BUN  10  9     Recent Labs      07/15/19   0345  07/17/19   0300   CREATININE  0.61  0.71       Imaging:  MRI on 7/14/2019  Impression:       1.  Innumerable nodular enhancing lesions throughout the brain parenchyma both the supra and infratentorial compartments predominantly near the gray-white junction. There has been interval increase in size of several of these lesions since previous exam.   These changes may be secondary to metastatic disease or granulomatous disease.    2.  Interval right parietal craniotomy with underlying elliptical postsurgical defect.    3.  Interval increase in size of index right thalamic lesion which has increased vasogenic edema since previous exam.    4.  Age-related cerebral atrophy.       Micro:  Results     Procedure Component Value Units Date/Time    CULTURE TISSUE W/ GRM STAIN [[id number]] Collected:  07/11/19 1745    Order Status:  Completed Specimen:  Tissue Updated:  07/17/19 1025     Significant Indicator NEG     Source TISS     Site Right Hip Cores     Culture Result No growth at 5 days.     Gram Stain Result No organisms seen.    Narrative:       Radiology specimen Hold specimen 14 days per Dr. Junior    Anaerobic Culture [[id number]] Collected:  07/11/19 1745    Order Status:  Completed Specimen:  Tissue  Updated:  07/17/19 1025     Significant Indicator NEG     Source TISS     Site Right Hip Cores     Culture Result Culture in progress.    Narrative:       Radiology specimen Hold specimen 14 days per Dr. Junior    Fungal Culture [[id number]] Collected:  07/11/19 1745    Order Status:  Completed Specimen:  Tissue Updated:  07/16/19 0908     Significant Indicator NEG     Source TISS     Site Right Hip Cores     Culture Result No fungal growth to date.    AFB Culture [[id number]] Collected:  07/11/19 1745    Order Status:  Completed Specimen:  Tissue Updated:  07/16/19 0908     Significant Indicator NEG     Source TISS     Site Right Hip Cores     Culture Result Culture in progress.     AFB Smear Results No acid fast bacilli seen.    Fungal Smear [[id number]] Collected:  06/19/19 0930    Order Status:  Completed Specimen:  Tissue from Other Body Fluid Updated:  07/16/19 0735     Significant Indicator NEG     Source TISS     Site Right Hip deep bone     Fungal Smear Results No fungal elements seen.    Narrative:       Collected By:[id number] BEATRICE HARRIS  Bone biopsy right hip  Collected By:[id number] BEATRICE HARRIS  Right hip fluid collection  Collected By:[id number] BEATRICE HARRIS    Fungal Culture [[id number]] Collected:  06/19/19 0930    Order Status:  Completed Specimen:  Tissue Updated:  07/16/19 0735     Significant Indicator NEG     Source TISS     Site Right Hip deep bone     Culture Result No fungal growth.    Narrative:       Collected By:[id number] BEATRICE HARRIS  Bone biopsy right hip  Collected By:[id number] BEATRICE HARRIS  Right hip fluid collection  Collected By:[id number] BEATRICE HARRIS    CULTURE TISSUE W/ GRM STAIN [[id number]] Collected:  06/19/19 0930    Order Status:  Completed Specimen:  Tissue Updated:  07/16/19 0735     Significant Indicator NEG     Source TISS     Site Right Hip deep bone     Culture Result No growth at 72 hours.     Gram Stain Result No organisms seen.    Narrative:       Collected By:[id number] MONTERO  "D  Bone biopsy right hip  Collected By:[id number] BEATRICE WINSTON STEVEN  Right hip fluid collection  Collected By:[id number] BEATRICE HARRIS    AFB Culture [[id number]] Collected:  06/19/19 0930    Order Status:  Completed Specimen:  Tissue from Other Body Fluid Updated:  07/16/19 0735     Significant Indicator NEG     Source TISS     Site Right Hip deep bone     Culture Result Culture in progress.     AFB Smear Results No acid fast bacilli seen.    Narrative:       Collected By:[id number] BEATRICE HARRIS  Bone biopsy right hip  Collected By:[id number] BEATRICE HARRIS  Right hip fluid collection  Collected By:[id number] BEATRICE HARRIS    GRAM STAIN [[id number]] Collected:  07/11/19 1745    Order Status:  Completed Specimen:  Tissue Updated:  07/12/19 2241     Significant Indicator .     Source TISS     Site Right Hip Cores     Gram Stain Result No organisms seen.    Narrative:       Radiology specimen Hold specimen 14 days per Dr. Junior    Acid Fast Stain [[id number]] Collected:  07/11/19 1745    Order Status:  Completed Specimen:  Tissue Updated:  07/12/19 2226     Significant Indicator NEG     Source TISS     Site Right Hip Cores     AFB Smear Results No acid fast bacilli seen.    AFB Culture [[id number]]     Order Status:  No result Specimen:  Tissue from Right Hip     Fungal Culture [[id number]]     Order Status:  No result Specimen:  Tissue from Right Hip           Assessment:  Active Hospital Problems    Diagnosis   • *Brain lesion [G93.9]   • Granulomatous disease  [L92.9]   • Abscess of right iliac muscle and right gluteus medius muscle [L02.91]   • Pseudoaneurysm following procedure ([facility]) [I97.89, I72.9]   • Sepsis ([facility]) [A41.9]   • History of breast cancer [Z85.3]       Assessment/Plan:  Encephalopathy, intermittent waxing and waning     Brain lesions, worse on MRI  Query CNS fungal infection vs mycobacterial or other   MRI 4/23 \"supra and infratentorial brain parenchyma. Decrease in the size of the lesions. Interval reduction in the extent " of the surrounding white matter edema consistent with improvement/treatment response of the most of the multifocal brain abscesses.  MRI 5/21 showed innumerable microabscesses vs mets  Abx (vancomycin, cefepime and Flagyl) discontinued on 5/23 after ~8 weeks of treatment-developed new fevers on 6/4  MRI on 6/8 with interval progression of supra and infratentorial intra--axial nodules and associated edema.  New right thalamus lesion. Radiology favors infection over neoplasm though both remain considerations (had been off abx)  Mult blood cultures neg  CHAS neg 3/15 and 5/24  MRI 6/22 worsening CNS lesions  S/p right craniotomy and resection of mass with biopsy on 6/28. Biopsy-per note fungal appearing elements seen per Neurosurgery-discussed with pathologist who examined the frozen section and there were no fungal appearing elements.   +necrotizing granulomas. All stains negative in EPIC  Fungal culture- negative to date  Bacterial cultures-negative to date  AFB culture-in progress  Pathology- focal necrotizing granulomatous inflammation; no fungal elements or acid-fast bacilli on stains.  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 but we have no confirmed tissue diagnosis  Continue pyridoxine (B6) while on isoniazid  Will need occasional testing AST/ALT, both so far within normal limits  Follow-up brain biopsy specimen sent to [facility] for PCR testing, bacterial, fungal, AFB   Follow-up serologic testing sent out to [facility]  Brucella ab - negative  Coxiella ab - negative   Histo ab serum & antigen urine-negative  Blasto ab- negative & antigen urine - pending       --- Discussed MRI findings with radiologist and there is easily obtained  "tissue/fluid in the sacrum and area that has been worsening despite therapy, per radiology images most consistent to TB or Brucellosis  -Pelvic biopsy on 7/11 - fluid drainage and specimen sent to [facility] again for AFB, bacterial and fungal, crypto and cocci PCR-confirmed with Alex this was sent out on 7/12   Pathology  - No definite acid fast or fungal organisms are seen.     Gluteal and iliopsoas abscess   OM L5, S1-3, new this admission  Recurrent abscesses  Adjacent to hardware in right hip (placed 12/17/18)  CT 4/23 \"Fluid collections within the right gluteal and iliacis muscles.. The collection within the right gluteal muscle has unchanged while the fluid collection within the right iliacis muscle is increased somewhat in size.\" 2.7 cm  Cultures 3/29 and 4/4 neg  MRI on 5/20/2019 - interval decrease in collection in R gluteal muscle and persistent multiloculated collection in R iliacus muscle.   CT 5/28 no change  s/p drainage on 5/30/2019-cultures negative  S/p removal hardware 6/5-no cultures done  1, 3 beta glucan neg  CT on 6/8 with residual abscess in the right iliacus muscle, 2.5 x 1.8 cm, slightly smaller than prior  s/p CT-guided deep bone biopsy 6/19/2019.  Cultures remain negative; path not reported   MRI 6/28 chronic discitis, diffuse OM L5, 3 and 4 cm abscesses right glute, 1.5 cm abscess or necrosis right posterior ileum, 3.3 cm abscess right SI joint  Continue TB therapy     Right gluteal pseudoaneurysm  significant interval enlargement noted by IR during procedure on 7/11  -CTA planned      +QuantiGold  Query disseminated TB  Path with necrotizing granulomas  AFB stain neg  AFB cultures pending  All prior AFB cxs still neg  Started RIPE +B6 7/3   Monitor for visual changes while on ethambutol - please ask if ophthalmology can get baseline vision as well as color vision testing done while inpatient     Type 2 DM  Hemoglobin A1c 6.3   Keep BS under 150 to help control current infection    Do not " recommend transfer to outside facility until outside testing is complete and there is a plan of care.     I have performed a physical exam and reviewed and updated ROS and plan today 7/17/2019.  In review of yesterday's note 7/16/2019, there are no changes except as documented above.    Discussed with IM

## 2019-07-18 PROCEDURE — 700111 HCHG RX REV CODE 636 W/ 250 OVERRIDE (IP): Performed by: FAMILY MEDICINE

## 2019-07-18 PROCEDURE — A9270 NON-COVERED ITEM OR SERVICE: HCPCS | Performed by: INTERNAL MEDICINE

## 2019-07-18 PROCEDURE — 700111 HCHG RX REV CODE 636 W/ 250 OVERRIDE (IP): Performed by: HOSPITALIST

## 2019-07-18 PROCEDURE — 99232 SBSQ HOSP IP/OBS MODERATE 35: CPT | Performed by: FAMILY MEDICINE

## 2019-07-18 PROCEDURE — 770006 HCHG ROOM/CARE - MED/SURG/GYN SEMI*

## 2019-07-18 PROCEDURE — 700102 HCHG RX REV CODE 250 W/ 637 OVERRIDE(OP): Performed by: FAMILY MEDICINE

## 2019-07-18 PROCEDURE — 700102 HCHG RX REV CODE 250 W/ 637 OVERRIDE(OP): Performed by: HOSPITALIST

## 2019-07-18 PROCEDURE — 700101 HCHG RX REV CODE 250: Performed by: FAMILY MEDICINE

## 2019-07-18 PROCEDURE — A9270 NON-COVERED ITEM OR SERVICE: HCPCS | Performed by: FAMILY MEDICINE

## 2019-07-18 PROCEDURE — A9270 NON-COVERED ITEM OR SERVICE: HCPCS | Performed by: HOSPITALIST

## 2019-07-18 PROCEDURE — 97116 GAIT TRAINING THERAPY: CPT

## 2019-07-18 PROCEDURE — 700102 HCHG RX REV CODE 250 W/ 637 OVERRIDE(OP): Performed by: INTERNAL MEDICINE

## 2019-07-18 PROCEDURE — 97530 THERAPEUTIC ACTIVITIES: CPT

## 2019-07-18 RX ORDER — TEMAZEPAM 15 MG/1
15 CAPSULE ORAL
Status: DISCONTINUED | OUTPATIENT
Start: 2019-07-18 | End: 2019-08-10 | Stop reason: HOSPADM

## 2019-07-18 RX ORDER — POTASSIUM CHLORIDE 20 MEQ/1
20 TABLET, EXTENDED RELEASE ORAL DAILY
Status: DISCONTINUED | OUTPATIENT
Start: 2019-07-19 | End: 2019-08-10 | Stop reason: HOSPADM

## 2019-07-18 RX ADMIN — MAGNESIUM 64 MG (MAGNESIUM CHLORIDE) TABLET,DELAYED RELEASE 128 MG: at 04:38

## 2019-07-18 RX ADMIN — HEPARIN SODIUM 5000 UNITS: 5000 INJECTION, SOLUTION INTRAVENOUS; SUBCUTANEOUS at 16:19

## 2019-07-18 RX ADMIN — HEPARIN SODIUM 5000 UNITS: 5000 INJECTION, SOLUTION INTRAVENOUS; SUBCUTANEOUS at 04:37

## 2019-07-18 RX ADMIN — OXYCODONE HYDROCHLORIDE 20 MG: 20 TABLET, FILM COATED, EXTENDED RELEASE ORAL at 16:20

## 2019-07-18 RX ADMIN — OXYCODONE HYDROCHLORIDE 20 MG: 20 TABLET, FILM COATED, EXTENDED RELEASE ORAL at 04:40

## 2019-07-18 RX ADMIN — SENNOSIDES, DOCUSATE SODIUM 2 TABLET: 50; 8.6 TABLET, FILM COATED ORAL at 04:40

## 2019-07-18 RX ADMIN — GABAPENTIN 400 MG: 400 CAPSULE ORAL at 04:39

## 2019-07-18 RX ADMIN — CINACALCET HYDROCHLORIDE 60 MG: 30 TABLET, COATED ORAL at 04:38

## 2019-07-18 RX ADMIN — OXYCODONE HYDROCHLORIDE 5 MG: 5 TABLET ORAL at 14:52

## 2019-07-18 RX ADMIN — SUCRALFATE 1 G: 1 SUSPENSION ORAL at 12:04

## 2019-07-18 RX ADMIN — ONDANSETRON 4 MG: 4 TABLET, ORALLY DISINTEGRATING ORAL at 04:40

## 2019-07-18 RX ADMIN — AMLODIPINE BESYLATE 10 MG: 5 TABLET ORAL at 04:40

## 2019-07-18 RX ADMIN — SUCRALFATE 1 G: 1 SUSPENSION ORAL at 16:20

## 2019-07-18 RX ADMIN — RIFAMPIN 300 MG: 300 CAPSULE ORAL at 16:20

## 2019-07-18 RX ADMIN — ISONIAZID 300 MG: 300 TABLET ORAL at 04:40

## 2019-07-18 RX ADMIN — METOPROLOL TARTRATE 25 MG: 25 TABLET, FILM COATED ORAL at 16:20

## 2019-07-18 RX ADMIN — SUCRALFATE 1 G: 1 SUSPENSION ORAL at 04:39

## 2019-07-18 RX ADMIN — PRAMIPEXOLE DIHYDROCHLORIDE 0.25 MG: 0.5 TABLET ORAL at 16:20

## 2019-07-18 RX ADMIN — PRAMIPEXOLE DIHYDROCHLORIDE 0.25 MG: 0.5 TABLET ORAL at 12:04

## 2019-07-18 RX ADMIN — Medication 1 CAPSULE: at 07:58

## 2019-07-18 RX ADMIN — LIDOCAINE 2 PATCH: 50 PATCH CUTANEOUS at 12:04

## 2019-07-18 RX ADMIN — PRAMIPEXOLE DIHYDROCHLORIDE 0.25 MG: 0.5 TABLET ORAL at 04:39

## 2019-07-18 RX ADMIN — GABAPENTIN 400 MG: 400 CAPSULE ORAL at 16:19

## 2019-07-18 RX ADMIN — METOPROLOL TARTRATE 25 MG: 25 TABLET, FILM COATED ORAL at 04:40

## 2019-07-18 RX ADMIN — RIFAMPIN 300 MG: 300 CAPSULE ORAL at 04:38

## 2019-07-18 RX ADMIN — MORPHINE SULFATE 4 MG: 4 INJECTION INTRAVENOUS at 20:02

## 2019-07-18 RX ADMIN — ACETAMINOPHEN 1000 MG: 500 TABLET ORAL at 16:19

## 2019-07-18 RX ADMIN — LOSARTAN POTASSIUM 75 MG: 50 TABLET ORAL at 04:39

## 2019-07-18 RX ADMIN — GABAPENTIN 400 MG: 400 CAPSULE ORAL at 12:04

## 2019-07-18 RX ADMIN — FERROUS SULFATE TAB 325 MG (65 MG ELEMENTAL FE) 325 MG: 325 (65 FE) TAB at 07:58

## 2019-07-18 RX ADMIN — ACETAMINOPHEN 1000 MG: 500 TABLET ORAL at 04:40

## 2019-07-18 RX ADMIN — PYRIDOXINE HCL TAB 50 MG 100 MG: 50 TAB at 04:40

## 2019-07-18 RX ADMIN — OMEPRAZOLE 20 MG: 20 CAPSULE, DELAYED RELEASE ORAL at 04:40

## 2019-07-18 RX ADMIN — MAGNESIUM 64 MG (MAGNESIUM CHLORIDE) TABLET,DELAYED RELEASE 128 MG: at 16:20

## 2019-07-18 RX ADMIN — PYRAZINAMIDE 1000 MG: 500 TABLET ORAL at 04:38

## 2019-07-18 RX ADMIN — SENNOSIDES, DOCUSATE SODIUM 2 TABLET: 50; 8.6 TABLET, FILM COATED ORAL at 16:20

## 2019-07-18 RX ADMIN — POTASSIUM CHLORIDE 40 MEQ: 20 TABLET, EXTENDED RELEASE ORAL at 04:39

## 2019-07-18 RX ADMIN — ATORVASTATIN CALCIUM 10 MG: 10 TABLET, FILM COATED ORAL at 16:20

## 2019-07-18 RX ADMIN — LEVETIRACETAM 500 MG: 500 TABLET, FILM COATED ORAL at 04:40

## 2019-07-18 RX ADMIN — TEMAZEPAM 15 MG: 15 CAPSULE ORAL at 22:15

## 2019-07-18 RX ADMIN — ETHAMBUTOL HYDROCHLORIDE 800 MG: 400 TABLET, FILM COATED ORAL at 04:38

## 2019-07-18 RX ADMIN — LEVETIRACETAM 500 MG: 500 TABLET, FILM COATED ORAL at 16:20

## 2019-07-18 ASSESSMENT — ENCOUNTER SYMPTOMS
DIZZINESS: 0
SORE THROAT: 0
BLURRED VISION: 0
HEADACHES: 0
WEAKNESS: 0
SENSORY CHANGE: 0
FEVER: 0
VOMITING: 0
NERVOUS/ANXIOUS: 0
PALPITATIONS: 0
DIARRHEA: 0
ABDOMINAL PAIN: 0
WHEEZING: 0
SPEECH CHANGE: 0
NAUSEA: 0
FOCAL WEAKNESS: 0
CHILLS: 0
BACK PAIN: 0
NECK PAIN: 0
SHORTNESS OF BREATH: 0
HEARTBURN: 0
COUGH: 0

## 2019-07-18 ASSESSMENT — GAIT ASSESSMENTS
ASSISTIVE DEVICE: FRONT WHEEL WALKER
GAIT LEVEL OF ASSIST: MINIMAL ASSIST
DEVIATION: SHUFFLED GAIT
DISTANCE (FEET): 50

## 2019-07-18 ASSESSMENT — COGNITIVE AND FUNCTIONAL STATUS - GENERAL
STANDING UP FROM CHAIR USING ARMS: A LITTLE
MOVING TO AND FROM BED TO CHAIR: A LOT
WALKING IN HOSPITAL ROOM: A LITTLE
TURNING FROM BACK TO SIDE WHILE IN FLAT BAD: A LOT
SUGGESTED CMS G CODE MODIFIER MOBILITY: CL
MOBILITY SCORE: 14
MOVING FROM LYING ON BACK TO SITTING ON SIDE OF FLAT BED: A LOT
CLIMB 3 TO 5 STEPS WITH RAILING: A LOT

## 2019-07-18 NOTE — THERAPY
Speech Therapy Contact Note:  Attempted cognitive evaluation. Patient currently on the bedpan. Will complete when able.

## 2019-07-18 NOTE — PROGRESS NOTES
[facility] Medicine Daily Progress Note    Date of Service  7/17/2019    Chief Complaint  68 y.o. female admitted 5/29/2019 with right iliac and gluteus abscess    Hospital Course  Admitted with right iliac gluteus abscess, known history of brain lesions with increasing enhancement noted on MRI.  Possible chronic osteomyelitis of the lumbar spine.    Interval Problem Update  Right iliac/continues abscess -cultures have been negative  Brain lesions -pathology showed focal necrotizing granulomatous inflammation with a marked histiocytic infiltrate.  HTN - sbp 120s    Consultants/Specialty  ID  Neurosurgery    Code Status  Full    Disposition  TBD    Review of Systems  Review of Systems   Constitutional: Negative for chills, fever and malaise/fatigue.   HENT: Negative for hearing loss and sore throat.    Eyes: Negative for blurred vision.   Respiratory: Negative for cough, shortness of breath and wheezing.    Cardiovascular: Negative for chest pain, palpitations and leg swelling.   Gastrointestinal: Negative for abdominal pain, diarrhea, heartburn, nausea and vomiting.   Genitourinary: Negative for dysuria.   Musculoskeletal: Negative for back pain, joint pain and neck pain.   Skin: Negative for rash.   Neurological: Negative for dizziness, sensory change, speech change, focal weakness, weakness and headaches.   Psychiatric/Behavioral: The patient is not nervous/anxious.         Physical Exam  Temp:  [36.2 °C (97.1 °F)-36.7 °C (98 °F)] 36.6 °C (97.9 °F)  Pulse:  [80-93] 93  Resp:  [16-18] 18  BP: (123-154)/(63-92) 123/72  SpO2:  [91 %-99 %] 92 %    Physical Exam   Constitutional: She is oriented to person, place, and time. She appears well-developed and well-nourished.   HENT:   Head: Normocephalic.   Incision on right parietal area   Eyes: Pupils are equal, round, and reactive to light. Conjunctivae are normal.   Cardiovascular: Normal rate and regular rhythm.    Pulmonary/Chest: Effort normal and breath sounds normal.    Abdominal: Soft. Bowel sounds are normal. She exhibits no distension. There is no tenderness.   Musculoskeletal: She exhibits no edema.   Neurological: She is alert and oriented to person, place, and time. No cranial nerve deficit.   MMT 5/5   Skin: Skin is warm and dry.   Nursing note and vitals reviewed.      Fluids  No intake or output data in the 24 hours ending 07/17/19 1707    Laboratory  Recent Labs      07/15/19   0345  07/17/19   0509   WBC  4.4*  4.9   RBC  3.16*  3.42*   HEMOGLOBIN  8.9*  10.0*   HEMATOCRIT  29.6*  32.1*   MCV  93.7  93.9   MCH  28.2  29.2   MCHC  30.1*  31.2*   RDW  55.8*  55.7*   PLATELETCT  190  274   MPV  9.0  8.9*     Recent Labs      07/15/19   0345  07/17/19   0300   SODIUM  137  137   POTASSIUM  4.2  4.3   CHLORIDE  108  106   CO2  22  24   GLUCOSE  87  89   BUN  10  9   CREATININE  0.61  0.71   CALCIUM  9.2  9.7                   Imaging  MR-BRAIN-WITH & W/O   Final Result      1.  Innumerable nodular enhancing lesions throughout the brain parenchyma both the supra and infratentorial compartments predominantly near the gray-white junction. There has been interval increase in size of several of these lesions since previous exam.    These changes may be secondary to metastatic disease or granulomatous disease.      2.  Interval right parietal craniotomy with underlying elliptical postsurgical defect.      3.  Interval increase in size of index right thalamic lesion which has increased vasogenic edema since previous exam.      4.  Age-related cerebral atrophy.      IR-PICC LINE PLACEMENT W/ GUIDANCE > AGE 5   Final Result                  Ultrasound-guided PICC placement performed by qualified nursing staff as    above.          CT-NEEDLE BX-ABD-RETROPERITONL   Final Result      1.  CT GUIDED biopsy of a right pelvic phlegmon.      2. Significant interval enlargement of a right gluteal pseudoaneurysm. CTA and consideration for possible intervention either with direct thrombin  injection or endovascular treatment was suggested. This was conveyed to Dr. Alatorre on 7/11/2019 via [place]    text at 1750 hours.      IA-SJYLCIC-1 VIEW   Final Result         1.  Moderate stool in the colon suggests changes of constipation, otherwise nonspecific bowel gas pattern   2.  Atherosclerosis      US-EXTREMITY VENOUS LOWER BILAT   Final Result      IR-US GUIDED PIV   Final Result    Ultrasound-guided PERIPHERAL IV INSERTION performed by    qualified nursing staff as above.            MR-LUMBAR SPINE-WITH & W/O   Final Result         1.  Grade 2-3 spondylolisthesis at the L5-S1 level with bilateral spondylolysis of the pars interarticularis. This causes severe bilateral neural foraminal stenosis at this level.      2.  Interval removal of posterior fusion hardware at the lumbosacral junction.      3.  Diffuse osteomyelitis involving the L5 vertebral body and the first 3 sacral segments.      4.  Chronic discitis at the L5-S1 level with anterior paraspinous abscess at this level and anterior to the S1 sacral segment.      5.  Smaller intraosseous bone bone abscesses or areas of necrosis noted in the sacrum.      6.  There is also evidence of osteomyelitis involving the jose antonio and sacrum bilaterally along the course of the previous sacral fixation screw. There is a large 4 cm abscess noted in the right gluteal soft tissues in a smaller 3 cm chronic appearing    abscess in the left gluteal musculature.      7.  There is a 1.5 cm intraosseous abscess or area of necrosis in the right posterior ilium.      8.  There is a 3.3 cm centimeters multiloculated abscess anterior to the right sacroiliac joint.      MR-STEALTH BRAIN WITH & W/O   Final Result         1.  Innumerable subcentimeter brain metastases, many at the gray-white junction but also multiple noted throughout the basal ganglia and brainstem.      2.  Largest index lesion is noted in the right thalamus and has increased in size since previous exam and  currently measures 9 mm and has a moderate amount of surrounding vasogenic edema.      US-EXTREMITY NON VASCULAR UNILATERAL RIGHT   Final Result      No right hip joint effusion. No soft tissue fluid collection.      MR-BRAIN-WITH & W/O   Final Result      1.  Innumerable supra and infratentorial enhancing nodules are again noted throughout the brain parenchyma with multiple lesions appearing somewhat larger and with increased enhancement. No associated diffusion restriction. Unchanged differential    considerations including bacterial or fungal infection versus metastatic disease.   2.  Mild diffuse cerebral substance loss.   3.  Mild microangiopathic ischemic change versus demyelination or gliosis.      CT-NEEDLE BX-DEEP BONE   Final Result      CT GUIDED RIGHT ILIUM DEEP BONE BIOPSY. SAMPLES WERE SENT TO MICROBIOLOGY FOR ANALYSIS.      CT-PELVIS WITH   Final Result         1.  No significant interval change in the size of the RIGHT iliac muscle fluid collections   2.  Hyperdense RIGHT gluteal lesion moderately suspicious for pseudoaneurysm with adjacent hematoma.   3.  Changes in the RIGHT iliac bone, BILATERAL sacrum and LEFT iliac bone suspicious for osteomyelitis   4.  Changes at L5-S1 suspicious for discitis osteomyelitis      CT-PELVIS WITH   Final Result      1.  Residual or recurrent abscess within the right iliacus muscle measuring 2.5 x 1.8 cm. It slightly smaller than on 5/28/2019      2.  Interval removal of hardware from the iliac bones and sacrum      3.  Lytic change of the right iliac bone and the sacrum. This could be related to hardware versus osteomyelitis.      4.  Subacute fractures of the right ilium, sacrum, and there is lucency within the left iliac bone that is likely from hardware      MR-BRAIN-WITH & W/O   Final Result      1.  Interval progression of supra and infratentorial intra-axial nodules and associated edema. This short-term interval progression favors infection over neoplasm  though both remain considerations.   2.  Mild atrophy      DX-PORTABLE FLUOROSCOPY < 1 HOUR   Final Result         Portable fluoroscopy utilized for 2 minutes.      DX-PELVIS-1 OR 2 VIEWS   Final Result      Status post hardware removal from the right SI joint      DX-CHEST-PORTABLE (1 VIEW)   Final Result      Interstitial prominence could be due to pulmonary edema or hypoventilatory change.      DX-CHEST-LIMITED (1 VIEW)   Final Result      LEFT basilar underinflation atelectasis or pneumonia      CT-DRAIN-HEMATOMA - SEROMA   Final Result      CT-guided RIGHT pelvic fluid collection aspiration, laboratory evaluation pending              Assessment/Plan  * Brain lesion- (present on admission)   Assessment & Plan    Right-sided craniotomy for resection of superficial mass  6/28/2019  Isoniazid, rifampin, ethambutol, pyrazinamide     Granulomatous disease - (present on admission)   Assessment & Plan    Isoniazid, rifampin, ethambutol, pyrazinamide     Sepsis (HCC)- (present on admission)   Assessment & Plan    This is sepsis (without associated acute organ dysfunction).    Source -right iliac and gluteus medius abscess     Abscess of right iliac muscle and right gluteus medius muscle- (present on admission)   Assessment & Plan    CT guided pelvic aspiration 5/30/2019  Hardware removal, pelvis 6/5/2019  CT guided deep bone biopsy 6/19/2019  CT guided aspiration/biopsy of a R gluteal fluid collection/phlegmon 7/11/2019  Isoniazid, rifampin, ethambutol, pyrazinamide       Hypokalemia- (present on admission)   Assessment & Plan    Kdur, follow bmp     Osteomyelitis (HCC)- (present on admission)   Assessment & Plan    Chronic?     Pseudoaneurysm following procedure (HCC)- (present on admission)   Assessment & Plan    Stable     Hypomagnesemia- (present on admission)   Assessment & Plan    Oral magnesium     Dysphagia- (present on admission)   Assessment & Plan    Dysphagia 3  SLP to follow     Hypercalcemia- (present on  admission)   Assessment & Plan    Has resolved  Continue to monitor calcium     Essential hypertension- (present on admission)   Assessment & Plan    Metoprolol, Losartan and Amlodipine     Chronic hyponatremia- (present on admission)   Assessment & Plan    Follow BMP     COPD (chronic obstructive pulmonary disease) (HCC)- (present on admission)   Assessment & Plan    RT protocol     Non-severe protein-calorie malnutrition (HCC)- (present on admission)   Assessment & Plan    Nutrition consult       History of DVT (deep vein thrombosis)- (present on admission)   Assessment & Plan    Hold Xarelto     RLS (restless legs syndrome)- (present on admission)   Assessment & Plan    Pramipexole     Chronic pain- (present on admission)   Assessment & Plan    Pain control       History of breast cancer- (present on admission)   Assessment & Plan    history left mastectomy and breast implant.  Follow-up on intraoperative final pathology results          VTE prophylaxis: Heparin

## 2019-07-18 NOTE — PROGRESS NOTES
Assumed care of pt at 0700.  Pt confused. Repeating the same words in [place].   Pt requires frequent reorientation at this time.   Will continue to monitor.

## 2019-07-18 NOTE — CARE PLAN
Problem: Knowledge Deficit  Goal: Knowledge of disease process/condition, treatment plan, diagnostic tests, and medications will improve  Outcome: PROGRESSING SLOWER THAN EXPECTED  Pt intermittently confused. Frequently reorientation. Hourly rounding in place.     Problem: Pain Management  Goal: Pain level will decrease to patient's comfort goal  Outcome: PROGRESSING AS EXPECTED  Pain assessed Q2h. Pain medication given per MAR.

## 2019-07-18 NOTE — PROGRESS NOTES
[facility] Medicine Daily Progress Note    Date of Service  7/18/2019    Chief Complaint  68 y.o. female admitted 5/29/2019 with right iliac and gluteus abscess    Hospital Course  Admitted with right iliac gluteus abscess, known history of brain lesions with increasing enhancement noted on MRI.  Possible chronic osteomyelitis of the lumbar spine.    Interval Problem Update  Right iliac/gluteus abscess - cultures have been negative  Brain lesions - pathology showed focal necrotizing granulomatous inflammation with a marked histiocytic infiltrate.  HTN - sbp 120-140    Consultants/Specialty  ID  Neurosurgery    Code Status  Full    Disposition  TBD    Review of Systems  Review of Systems   Constitutional: Negative for chills, fever and malaise/fatigue.   HENT: Negative for hearing loss and sore throat.    Eyes: Negative for blurred vision.   Respiratory: Negative for cough, shortness of breath and wheezing.    Cardiovascular: Negative for chest pain, palpitations and leg swelling.   Gastrointestinal: Negative for abdominal pain, diarrhea, heartburn, nausea and vomiting.   Genitourinary: Negative for dysuria.   Musculoskeletal: Negative for back pain, joint pain and neck pain.   Skin: Negative for rash.   Neurological: Negative for dizziness, sensory change, speech change, focal weakness, weakness and headaches.   Psychiatric/Behavioral: The patient is not nervous/anxious.         Physical Exam  Temp:  [36.2 °C (97.1 °F)-36.6 °C (97.9 °F)] 36.2 °C (97.1 °F)  Pulse:  [72-93] 72  Resp:  [15-18] 15  BP: (123-147)/(69-82) 147/82  SpO2:  [92 %-98 %] 95 %    Physical Exam   Constitutional: She is oriented to person, place, and time. She appears well-developed and well-nourished.   HENT:   Head: Normocephalic.   Incision on right parietal area   Eyes: Pupils are equal, round, and reactive to light. Conjunctivae are normal.   Cardiovascular: Normal rate and regular rhythm.    Pulmonary/Chest: Effort normal and breath sounds  normal.   Abdominal: Soft. Bowel sounds are normal. She exhibits no distension. There is no tenderness.   Musculoskeletal: She exhibits no edema.   Neurological: She is alert and oriented to person, place, and time. No cranial nerve deficit.   MMT 5/5   Skin: Skin is warm and dry.   Nursing note and vitals reviewed.      Fluids    Intake/Output Summary (Last 24 hours) at 07/18/19 1439  Last data filed at 07/18/19 1200   Gross per 24 hour   Intake              500 ml   Output                0 ml   Net              500 ml       Laboratory  Recent Labs      07/17/19   0509   WBC  4.9   RBC  3.42*   HEMOGLOBIN  10.0*   HEMATOCRIT  32.1*   MCV  93.9   MCH  29.2   MCHC  31.2*   RDW  55.7*   PLATELETCT  274   MPV  8.9*     Recent Labs      07/17/19   0300   SODIUM  137   POTASSIUM  4.3   CHLORIDE  106   CO2  24   GLUCOSE  89   BUN  9   CREATININE  0.71   CALCIUM  9.7                   Imaging  MR-BRAIN-WITH & W/O   Final Result      1.  Innumerable nodular enhancing lesions throughout the brain parenchyma both the supra and infratentorial compartments predominantly near the gray-white junction. There has been interval increase in size of several of these lesions since previous exam.    These changes may be secondary to metastatic disease or granulomatous disease.      2.  Interval right parietal craniotomy with underlying elliptical postsurgical defect.      3.  Interval increase in size of index right thalamic lesion which has increased vasogenic edema since previous exam.      4.  Age-related cerebral atrophy.      IR-PICC LINE PLACEMENT W/ GUIDANCE > AGE 5   Final Result                  Ultrasound-guided PICC placement performed by qualified nursing staff as    above.          CT-NEEDLE BX-ABD-RETROPERITONL   Final Result      1.  CT GUIDED biopsy of a right pelvic phlegmon.      2. Significant interval enlargement of a right gluteal pseudoaneurysm. CTA and consideration for possible intervention either with direct  thrombin injection or endovascular treatment was suggested. This was conveyed to Dr. Alatorre on 7/11/2019 via [place]    text at 1750 hours.      [id number]-0 VIEW   Final Result         1.  Moderate stool in the colon suggests changes of constipation, otherwise nonspecific bowel gas pattern   2.  Atherosclerosis      US-EXTREMITY VENOUS LOWER BILAT   Final Result      IR-US GUIDED PIV   Final Result    Ultrasound-guided PERIPHERAL IV INSERTION performed by    qualified nursing staff as above.            MR-LUMBAR SPINE-WITH & W/O   Final Result         1.  Grade 2-3 spondylolisthesis at the L5-S1 level with bilateral spondylolysis of the pars interarticularis. This causes severe bilateral neural foraminal stenosis at this level.      2.  Interval removal of posterior fusion hardware at the lumbosacral junction.      3.  Diffuse osteomyelitis involving the L5 vertebral body and the first 3 sacral segments.      4.  Chronic discitis at the L5-S1 level with anterior paraspinous abscess at this level and anterior to the S1 sacral segment.      5.  Smaller intraosseous bone bone abscesses or areas of necrosis noted in the sacrum.      6.  There is also evidence of osteomyelitis involving the jose antonio and sacrum bilaterally along the course of the previous sacral fixation screw. There is a large 4 cm abscess noted in the right gluteal soft tissues in a smaller 3 cm chronic appearing    abscess in the left gluteal musculature.      7.  There is a 1.5 cm intraosseous abscess or area of necrosis in the right posterior ilium.      8.  There is a 3.3 cm centimeters multiloculated abscess anterior to the right sacroiliac joint.      MR-STEALTH BRAIN WITH & W/O   Final Result         1.  Innumerable subcentimeter brain metastases, many at the gray-white junction but also multiple noted throughout the basal ganglia and brainstem.      2.  Largest index lesion is noted in the right thalamus and has increased in size since previous exam  and currently measures 9 mm and has a moderate amount of surrounding vasogenic edema.      US-EXTREMITY NON VASCULAR UNILATERAL RIGHT   Final Result      No right hip joint effusion. No soft tissue fluid collection.      MR-BRAIN-WITH & W/O   Final Result      1.  Innumerable supra and infratentorial enhancing nodules are again noted throughout the brain parenchyma with multiple lesions appearing somewhat larger and with increased enhancement. No associated diffusion restriction. Unchanged differential    considerations including bacterial or fungal infection versus metastatic disease.   2.  Mild diffuse cerebral substance loss.   3.  Mild microangiopathic ischemic change versus demyelination or gliosis.      CT-NEEDLE BX-DEEP BONE   Final Result      CT GUIDED RIGHT ILIUM DEEP BONE BIOPSY. SAMPLES WERE SENT TO MICROBIOLOGY FOR ANALYSIS.      CT-PELVIS WITH   Final Result         1.  No significant interval change in the size of the RIGHT iliac muscle fluid collections   2.  Hyperdense RIGHT gluteal lesion moderately suspicious for pseudoaneurysm with adjacent hematoma.   3.  Changes in the RIGHT iliac bone, BILATERAL sacrum and LEFT iliac bone suspicious for osteomyelitis   4.  Changes at L5-S1 suspicious for discitis osteomyelitis      CT-PELVIS WITH   Final Result      1.  Residual or recurrent abscess within the right iliacus muscle measuring 2.5 x 1.8 cm. It slightly smaller than on 5/28/2019      2.  Interval removal of hardware from the iliac bones and sacrum      3.  Lytic change of the right iliac bone and the sacrum. This could be related to hardware versus osteomyelitis.      4.  Subacute fractures of the right ilium, sacrum, and there is lucency within the left iliac bone that is likely from hardware      MR-BRAIN-WITH & W/O   Final Result      1.  Interval progression of supra and infratentorial intra-axial nodules and associated edema. This short-term interval progression favors infection over neoplasm  though both remain considerations.   2.  Mild atrophy      DX-PORTABLE FLUOROSCOPY < 1 HOUR   Final Result         Portable fluoroscopy utilized for 2 minutes.      DX-PELVIS-1 OR 2 VIEWS   Final Result      Status post hardware removal from the right SI joint      DX-CHEST-PORTABLE (1 VIEW)   Final Result      Interstitial prominence could be due to pulmonary edema or hypoventilatory change.      DX-CHEST-LIMITED (1 VIEW)   Final Result      LEFT basilar underinflation atelectasis or pneumonia      CT-DRAIN-HEMATOMA - SEROMA   Final Result      CT-guided RIGHT pelvic fluid collection aspiration, laboratory evaluation pending              Assessment/Plan  * Brain lesion- (present on admission)   Assessment & Plan    Right-sided craniotomy for resection of superficial mass  6/28/2019  Isoniazid, Rifampin, Ethambutol, Pyrazinamide  Keppra for seizure prophylaxis     Granulomatous disease - (present on admission)   Assessment & Plan    Isoniazid, Rifampin, Ethambutol, Pyrazinamide     Sepsis (HCC)- (present on admission)   Assessment & Plan    This is sepsis (without associated acute organ dysfunction).    Source - Right Iliac and Gluteus medius abscess     Abscess of right iliac muscle and right gluteus medius muscle- (present on admission)   Assessment & Plan    CT guided pelvic aspiration 5/30/2019  Hardware removal, pelvis 6/5/2019  CT guided deep bone biopsy 6/19/2019  CT guided aspiration/biopsy of a R gluteal fluid collection/phlegmon 7/11/2019  Isoniazid, Rifampin, Ethambutol, Pyrazinamide       Hypokalemia- (present on admission)   Assessment & Plan    Decrease Kdur to 20 meqs daily, follow bmp     Osteomyelitis (HCC)- (present on admission)   Assessment & Plan    Chronic?     Pseudoaneurysm following procedure (HCC)- (present on admission)   Assessment & Plan    Stable     Hypomagnesemia- (present on admission)   Assessment & Plan    Oral magnesium     Dysphagia- (present on admission)   Assessment & Plan     Dysphagia 3  SLP to follow     Hypercalcemia- (present on admission)   Assessment & Plan    Follow bmp     Essential hypertension- (present on admission)   Assessment & Plan    Metoprolol, Losartan and Amlodipine     Chronic hyponatremia- (present on admission)   Assessment & Plan    Follow BMP     COPD (chronic obstructive pulmonary disease) (HCC)- (present on admission)   Assessment & Plan    RT protocol     Non-severe protein-calorie malnutrition (HCC)- (present on admission)   Assessment & Plan    Nutrition consult       History of DVT (deep vein thrombosis)- (present on admission)   Assessment & Plan    Hold Xarelto     RLS (restless legs syndrome)- (present on admission)   Assessment & Plan    Pramipexole     Chronic pain- (present on admission)   Assessment & Plan    Pain control       History of breast cancer- (present on admission)   Assessment & Plan    history left mastectomy and breast implant.  Follow-up on intraoperative final pathology results          VTE prophylaxis: Heparin

## 2019-07-19 ENCOUNTER — APPOINTMENT (OUTPATIENT)
Dept: RADIOLOGY | Facility: MEDICAL CENTER | Age: 69
DRG: 356 | End: 2019-07-19
Attending: FAMILY MEDICINE
Payer: MEDICARE

## 2019-07-19 PROCEDURE — 99232 SBSQ HOSP IP/OBS MODERATE 35: CPT | Performed by: FAMILY MEDICINE

## 2019-07-19 PROCEDURE — 74174 CTA ABD&PLVS W/CONTRAST: CPT

## 2019-07-19 PROCEDURE — 99232 SBSQ HOSP IP/OBS MODERATE 35: CPT | Performed by: INTERNAL MEDICINE

## 2019-07-19 PROCEDURE — 700102 HCHG RX REV CODE 250 W/ 637 OVERRIDE(OP): Performed by: FAMILY MEDICINE

## 2019-07-19 PROCEDURE — 700102 HCHG RX REV CODE 250 W/ 637 OVERRIDE(OP): Performed by: HOSPITALIST

## 2019-07-19 PROCEDURE — A9270 NON-COVERED ITEM OR SERVICE: HCPCS | Performed by: FAMILY MEDICINE

## 2019-07-19 PROCEDURE — A9270 NON-COVERED ITEM OR SERVICE: HCPCS | Performed by: HOSPITALIST

## 2019-07-19 PROCEDURE — 700102 HCHG RX REV CODE 250 W/ 637 OVERRIDE(OP): Performed by: INTERNAL MEDICINE

## 2019-07-19 PROCEDURE — 700101 HCHG RX REV CODE 250: Performed by: FAMILY MEDICINE

## 2019-07-19 PROCEDURE — 700111 HCHG RX REV CODE 636 W/ 250 OVERRIDE (IP): Performed by: HOSPITALIST

## 2019-07-19 PROCEDURE — 97530 THERAPEUTIC ACTIVITIES: CPT

## 2019-07-19 PROCEDURE — A9270 NON-COVERED ITEM OR SERVICE: HCPCS | Performed by: INTERNAL MEDICINE

## 2019-07-19 PROCEDURE — 92523 SPEECH SOUND LANG COMPREHEN: CPT

## 2019-07-19 PROCEDURE — 700117 HCHG RX CONTRAST REV CODE 255: Performed by: FAMILY MEDICINE

## 2019-07-19 PROCEDURE — 770001 HCHG ROOM/CARE - MED/SURG/GYN PRIV*

## 2019-07-19 PROCEDURE — 97535 SELF CARE MNGMENT TRAINING: CPT

## 2019-07-19 RX ADMIN — OMEPRAZOLE 20 MG: 20 CAPSULE, DELAYED RELEASE ORAL at 05:56

## 2019-07-19 RX ADMIN — SUCRALFATE 1 G: 1 SUSPENSION ORAL at 12:50

## 2019-07-19 RX ADMIN — GABAPENTIN 400 MG: 400 CAPSULE ORAL at 17:14

## 2019-07-19 RX ADMIN — ISONIAZID 300 MG: 300 TABLET ORAL at 05:57

## 2019-07-19 RX ADMIN — LEVETIRACETAM 500 MG: 500 TABLET, FILM COATED ORAL at 17:15

## 2019-07-19 RX ADMIN — SUCRALFATE 1 G: 1 SUSPENSION ORAL at 17:16

## 2019-07-19 RX ADMIN — HEPARIN SODIUM 5000 UNITS: 5000 INJECTION, SOLUTION INTRAVENOUS; SUBCUTANEOUS at 05:57

## 2019-07-19 RX ADMIN — POTASSIUM CHLORIDE 20 MEQ: 20 TABLET, EXTENDED RELEASE ORAL at 05:56

## 2019-07-19 RX ADMIN — MAGNESIUM 64 MG (MAGNESIUM CHLORIDE) TABLET,DELAYED RELEASE 128 MG: at 05:56

## 2019-07-19 RX ADMIN — TEMAZEPAM 15 MG: 15 CAPSULE ORAL at 21:26

## 2019-07-19 RX ADMIN — PYRIDOXINE HCL TAB 50 MG 100 MG: 50 TAB at 05:55

## 2019-07-19 RX ADMIN — IOHEXOL 100 ML: 350 INJECTION, SOLUTION INTRAVENOUS at 19:50

## 2019-07-19 RX ADMIN — SENNOSIDES, DOCUSATE SODIUM 2 TABLET: 50; 8.6 TABLET, FILM COATED ORAL at 05:57

## 2019-07-19 RX ADMIN — RIFAMPIN 300 MG: 300 CAPSULE ORAL at 17:14

## 2019-07-19 RX ADMIN — CINACALCET HYDROCHLORIDE 60 MG: 30 TABLET, COATED ORAL at 05:56

## 2019-07-19 RX ADMIN — AMLODIPINE BESYLATE 10 MG: 5 TABLET ORAL at 05:57

## 2019-07-19 RX ADMIN — PRAMIPEXOLE DIHYDROCHLORIDE 0.25 MG: 0.5 TABLET ORAL at 12:53

## 2019-07-19 RX ADMIN — OXYCODONE HYDROCHLORIDE 10 MG: 5 TABLET ORAL at 14:41

## 2019-07-19 RX ADMIN — METOPROLOL TARTRATE 25 MG: 25 TABLET, FILM COATED ORAL at 05:57

## 2019-07-19 RX ADMIN — ACETAMINOPHEN 1000 MG: 500 TABLET ORAL at 17:14

## 2019-07-19 RX ADMIN — METOPROLOL TARTRATE 25 MG: 25 TABLET, FILM COATED ORAL at 17:15

## 2019-07-19 RX ADMIN — SUCRALFATE 1 G: 1 SUSPENSION ORAL at 05:56

## 2019-07-19 RX ADMIN — LOSARTAN POTASSIUM 75 MG: 50 TABLET ORAL at 05:58

## 2019-07-19 RX ADMIN — OXYCODONE HYDROCHLORIDE 10 MG: 5 TABLET ORAL at 10:15

## 2019-07-19 RX ADMIN — PRAMIPEXOLE DIHYDROCHLORIDE 0.25 MG: 0.5 TABLET ORAL at 05:57

## 2019-07-19 RX ADMIN — GABAPENTIN 400 MG: 400 CAPSULE ORAL at 12:50

## 2019-07-19 RX ADMIN — HEPARIN SODIUM 5000 UNITS: 5000 INJECTION, SOLUTION INTRAVENOUS; SUBCUTANEOUS at 17:15

## 2019-07-19 RX ADMIN — ATORVASTATIN CALCIUM 10 MG: 10 TABLET, FILM COATED ORAL at 17:14

## 2019-07-19 RX ADMIN — ETHAMBUTOL HYDROCHLORIDE 800 MG: 400 TABLET, FILM COATED ORAL at 05:57

## 2019-07-19 RX ADMIN — MAGNESIUM 64 MG (MAGNESIUM CHLORIDE) TABLET,DELAYED RELEASE 128 MG: at 17:14

## 2019-07-19 RX ADMIN — PRAMIPEXOLE DIHYDROCHLORIDE 0.25 MG: 0.5 TABLET ORAL at 17:15

## 2019-07-19 RX ADMIN — ACETAMINOPHEN 1000 MG: 500 TABLET ORAL at 05:57

## 2019-07-19 RX ADMIN — PYRAZINAMIDE 1000 MG: 500 TABLET ORAL at 05:55

## 2019-07-19 RX ADMIN — FERROUS SULFATE TAB 325 MG (65 MG ELEMENTAL FE) 325 MG: 325 (65 FE) TAB at 09:28

## 2019-07-19 RX ADMIN — GABAPENTIN 400 MG: 400 CAPSULE ORAL at 05:57

## 2019-07-19 RX ADMIN — RIFAMPIN 300 MG: 300 CAPSULE ORAL at 05:57

## 2019-07-19 RX ADMIN — LIDOCAINE 2 PATCH: 50 PATCH CUTANEOUS at 12:50

## 2019-07-19 RX ADMIN — LEVETIRACETAM 500 MG: 500 TABLET, FILM COATED ORAL at 05:55

## 2019-07-19 RX ADMIN — Medication 1 CAPSULE: at 09:28

## 2019-07-19 RX ADMIN — OXYCODONE HYDROCHLORIDE 20 MG: 20 TABLET, FILM COATED, EXTENDED RELEASE ORAL at 17:15

## 2019-07-19 RX ADMIN — OXYCODONE HYDROCHLORIDE 20 MG: 20 TABLET, FILM COATED, EXTENDED RELEASE ORAL at 05:56

## 2019-07-19 ASSESSMENT — ENCOUNTER SYMPTOMS
DIARRHEA: 0
SORE THROAT: 0
BACK PAIN: 0
PALPITATIONS: 0
FEVER: 0
SPEECH CHANGE: 0
CHILLS: 0
VOMITING: 0
WEAKNESS: 0
NAUSEA: 0
NECK PAIN: 0
SHORTNESS OF BREATH: 0
BLURRED VISION: 0
NERVOUS/ANXIOUS: 0
WHEEZING: 0
ABDOMINAL PAIN: 0
FOCAL WEAKNESS: 0
COUGH: 0
HEARTBURN: 0
SENSORY CHANGE: 0
HEADACHES: 0
DIZZINESS: 0

## 2019-07-19 ASSESSMENT — COGNITIVE AND FUNCTIONAL STATUS - GENERAL
TOILETING: A LOT
DRESSING REGULAR UPPER BODY CLOTHING: A LITTLE
DAILY ACTIVITIY SCORE: 16
HELP NEEDED FOR BATHING: A LOT
SUGGESTED CMS G CODE MODIFIER DAILY ACTIVITY: CK
PERSONAL GROOMING: A LITTLE
DRESSING REGULAR LOWER BODY CLOTHING: A LOT

## 2019-07-19 NOTE — CARE PLAN
Problem: Safety  Goal: Will remain free from injury  Outcome: PROGRESSING AS EXPECTED  Call bell within reach. Bed locked and in lowest position. Bed alarm on. Room near nurses station. Rounding maintained.    Problem: Bowel/Gastric:  Goal: Normal bowel function is maintained or improved  Outcome: PROGRESSING AS EXPECTED   7/19

## 2019-07-19 NOTE — CARE PLAN
Problem: Safety  Goal: Will remain free from injury  Outcome: PROGRESSING AS EXPECTED  Pt. Educated on fall protocol rational    Problem: Skin Integrity  Goal: Risk for impaired skin integrity will decrease  Outcome: PROGRESSING AS EXPECTED  Pt. Educated on importance of turning q2 and limiting moisture to skin contact to maintain skin integrity.

## 2019-07-19 NOTE — PROGRESS NOTES
Infectious Disease Progress Note    Author: Ashely Hinojosa M.D. Date & Time of service: 7/19/2019  10:53 AM    Chief Complaint:  Brain abscess, gluteal abscess  Vertebral OM     Interval History:  68 y.o. female admitted 5/29/2019 as a transfer from [facility] since 4/30/2019. + diabetes, SANTOSH of right hip with hardware, and breast cancer who was originally admitted to [facility] on 03/08/2019 for right hip and pelvic pain.  Work-up revealed a right iliopsoas lesion, gluteal abscess, and mult brain abscesses     7/4 AF much more alert and conversant today-c/o left hip pain, unrelenting and would like parietal dressing changed. HA at surgical site.  Denies SE abx. No new neurodeficits  7/5 afebrile WBC 5.4.  Patient sleeping but arousable.  She denies any headache, nausea, vomiting.  7/6 afebrile WBC 6.5.  Patient sitting up in chair and having excruciating back pain  7/8 Pt has no specific complaints, she is mildly confused today.  She seems to be tolerating her medications with no significant lab abnormalities.  7/9 AF, O2 RA, Significant left hip pain today.  She does appear to be tolerating her antibiotics.  Ultrasound of bilateral lower extremities has been ordered.  7/10 AF, O2 RA,  Plan for IR drainage of sacral collection soon.   7/11 AF, O2 RA,  Pt continued on RIPE and ampho.  She is tolerating well overall, requiring some mag and potassium replacement.  She is complaining of severe pain in left hip again today.  Plan for biopsy later today.   7/12  IR biopsy of R gluteal abscess/hip. AF, O2 2 L NC,   tolerating antibiotics so far.  She is quite somnolent today but per nursing she was oriented x4 earlier  7/14 AF, O2 RA, more alert today, conversant and asking for advance in diet. Left hip pain ongoing but improved.   7/15 afebrile WBC 4.4.  Patient's speech is somewhat muffled but she is alert and responding to questions appropriately.  MRI shows worsening lesions and amphotericin stopped.  Nurse reports  waxing and waning mental status   afebrile.  Patient sleeping but arousable.  She is answer questions appropriately but then forgets that she is in the hospital.   afebrile WBC 4.9 patient is very drowsy as she states she did not sleep well yesterday.  She is asking about whether or not she has a fungal infection.  Plan of care discussed with patient.   afebrile patient continues to only endorse left hip pain exacerbated by sitting up in the chair.  She sat in the chair for 30 minutes yesterday.  Getting out of bed is limited secondary to left hip pain.  Mental status remains about the same with intermittent waxing and waning.  No new issues today per RN    Review of Systems:  Review of Systems   Constitutional: Positive for malaise/fatigue. Negative for chills and fever.   Respiratory: Negative for shortness of breath.    Gastrointestinal: Negative for abdominal pain, nausea and vomiting.   Musculoskeletal: Positive for joint pain.        Left hip   Neurological: Negative for dizziness and headaches.       Hemodynamics:  Temp (24hrs), Av.7 °C (98.1 °F), Min:36.4 °C (97.5 °F), Max:37 °C (98.6 °F)  Temperature: 36.9 °C (98.4 °F)  Pulse  Av.4  Min: 49  Max: 195   Blood Pressure : 136/73       Physical Exam:  Physical Exam   Constitutional:   Elderly  Chronically ill-appearing   HENT:   Mouth/Throat: Oropharynx is clear and moist. No oropharyngeal exudate.   Right parietal surgical site. Well approximated, no drainage or surrounding erythema.  Staples removed   Eyes: Pupils are equal, round, and reactive to light. Conjunctivae and EOM are normal.   Neck: Neck supple.   Cardiovascular: Normal rate and regular rhythm.    Pulmonary/Chest: Effort normal. She has no wheezes. She has no rales.   Abdominal: Soft. There is no tenderness.   Musculoskeletal: She exhibits no edema.   Right upper extremity PICC line   Neurological: She is alert.   Skin: She is not diaphoretic.       Meds:    Current  Facility-Administered Medications:   •  temazepam  •  potassium chloride SA  •  losartan  •  pramipexole  •  senna-docusate  •  polyethylene glycol/lytes  •  magnesium hydroxide  •  gabapentin  •  ferrous sulfate  •  diphenhydrAMINE  •  morphine injection  •  heparin  •  oxyCODONE CR  •  magnesium chloride  •  pyridoxine  •  levETIRAcetam  •  isoniazid  •  riFAMPin  •  ethambutol  •  pyrazinamide  •  acetaminophen  •  Pharmacy Consult Request  •  labetalol  •  hydrALAZINE  •  NS  •  lactobacillus rhamnosus  •  oxyCODONE immediate-release  •  sucralfate  •  acetaminophen  •  lidocaine  •  cyclobenzaprine  •  Respiratory Care per Protocol  •  amLODIPine  •  cinacalcet  •  atorvastatin  •  omeprazole  •  metoprolol  •  ondansetron  •  ondansetron    Labs:  Recent Labs      07/17/19   0509   WBC  4.9   RBC  3.42*   HEMOGLOBIN  10.0*   HEMATOCRIT  32.1*   MCV  93.9   MCH  29.2   RDW  55.7*   PLATELETCT  274   MPV  8.9*     Recent Labs      07/17/19   0300   SODIUM  137   POTASSIUM  4.3   CHLORIDE  106   CO2  24   GLUCOSE  89   BUN  9     Recent Labs      07/17/19   0300   CREATININE  0.71       Imaging:  MRI on 7/14/2019  Impression:       1.  Innumerable nodular enhancing lesions throughout the brain parenchyma both the supra and infratentorial compartments predominantly near the gray-white junction. There has been interval increase in size of several of these lesions since previous exam.   These changes may be secondary to metastatic disease or granulomatous disease.    2.  Interval right parietal craniotomy with underlying elliptical postsurgical defect.    3.  Interval increase in size of index right thalamic lesion which has increased vasogenic edema since previous exam.    4.  Age-related cerebral atrophy.       Micro:  Results     Procedure Component Value Units Date/Time    Anaerobic Culture [[id number]] Collected:  07/11/19 174    Order Status:  Completed Specimen:  Tissue Updated:  07/19/19 0774     Significant  Indicator NEG     Source TISS     Site Right Hip Cores     Culture Result Culture in progress.    Narrative:       Radiology specimen Hold specimen 14 days per Dr. Junior    CULTURE TISSUE W/ GRM STAIN [[id number]] Collected:  07/11/19 1745    Order Status:  Completed Specimen:  Tissue Updated:  07/19/19 0739     Significant Indicator NEG     Source TISS     Site Right Hip Cores     Culture Result No growth at 7 days.     Gram Stain Result No organisms seen.    Narrative:       Radiology specimen Hold specimen 14 days per Dr. Junior    Fungal Smear [[id number]] Collected:  06/19/19 0930    Order Status:  Completed Specimen:  Tissue from Other Body Fluid Updated:  07/18/19 1325     Significant Indicator NEG     Source TISS     Site Right Hip deep bone     Fungal Smear Results No fungal elements seen.    Narrative:       Collected By:[id number] BEATRICE HARRIS  Bone biopsy right hip  Collected By:[id number] BEATRICE HARRIS  Right hip fluid collection  Collected By:[id number] BEATRICE HARRIS    Fungal Culture [[id number]] Collected:  06/19/19 0930    Order Status:  Completed Specimen:  Tissue Updated:  07/18/19 1325     Significant Indicator NEG     Source TISS     Site Right Hip deep bone     Culture Result No fungal growth.    Narrative:       Collected By:[id number] BEATRICE HARRIS  Bone biopsy right hip  Collected By:[id number] BEATRICE HARRIS  Right hip fluid collection  Collected By:[id number] BEATRICE HARRIS    AFB Culture [[id number]] Collected:  06/19/19 0930    Order Status:  Completed Specimen:  Tissue from Other Body Fluid Updated:  07/18/19 1325     Significant Indicator NEG     Source TISS     Site Right Hip deep bone     Culture Result Acid fast bacilli detected by MGIT 960 instrument.  Identification to follow.       AFB Smear Results No acid fast bacilli seen.    Narrative:       Collected By:[id number] BEATRICE HARRIS  Bone biopsy right hip  Collected By:[id number] BEATRICE HARRIS  Right hip fluid collection  Collected By:[id number] BEATRICE HARRIS     CULTURE TISSUE W/ GRM STAIN [[id number]] Collected:  06/19/19 0930    Order Status:  Completed Specimen:  Tissue Updated:  07/18/19 1325     Significant Indicator NEG     Source TISS     Site Right Hip deep bone     Culture Result No growth at 72 hours.     Gram Stain Result No organisms seen.    Narrative:       Collected By:[id number] BEATRICE HARRIS  Bone biopsy right hip  Collected By:[id number] BEATRICE HARRIS  Right hip fluid collection  Collected By:[id number] BEATRICE HARRIS    Fungal Culture [[id number]] Collected:  07/11/19 1745    Order Status:  Completed Specimen:  Tissue Updated:  07/16/19 0908     Significant Indicator NEG     Source TISS     Site Right Hip Cores     Culture Result No fungal growth to date.    AFB Culture [[id number]] Collected:  07/11/19 1745    Order Status:  Completed Specimen:  Tissue Updated:  07/16/19 0908     Significant Indicator NEG     Source TISS     Site Right Hip Cores     Culture Result Culture in progress.     AFB Smear Results No acid fast bacilli seen.    GRAM STAIN [[id number]] Collected:  07/11/19 1745    Order Status:  Completed Specimen:  Tissue Updated:  07/12/19 2241     Significant Indicator .     Source TISS     Site Right Hip Cores     Gram Stain Result No organisms seen.    Narrative:       Radiology specimen Hold specimen 14 days per Dr. Junior    Acid Fast Stain [[id number]] Collected:  07/11/19 1745    Order Status:  Completed Specimen:  Tissue Updated:  07/12/19 2226     Significant Indicator NEG     Source TISS     Site Right Hip Cores     AFB Smear Results No acid fast bacilli seen.          Assessment:  Active Hospital Problems    Diagnosis   • *Brain lesion [G93.9]   • Granulomatous disease  [L92.9]   • Abscess of right iliac muscle and right gluteus medius muscle [L02.91]   • Pseudoaneurysm following procedure ([facility]) [I97.89, I72.9]   • Sepsis ([facility]) [A41.9]   • History of breast cancer [Z85.3]       Assessment/Plan:  Encephalopathy, intermittent waxing and  "waning     Brain lesions, worse on MRI  Query CNS fungal infection vs mycobacterial or other   MRI 4/23 \"supra and infratentorial brain parenchyma. Decrease in the size of the lesions. Interval reduction in the extent of the surrounding white matter edema consistent with improvement/treatment response of the most of the multifocal brain abscesses.  MRI 5/21 showed innumerable microabscesses vs mets  Abx (vancomycin, cefepime and Flagyl) discontinued on 5/23 after ~8 weeks of treatment-developed new fevers on 6/4  MRI on 6/8 with interval progression of supra and infratentorial intra--axial nodules and associated edema.  New right thalamus lesion. Radiology favors infection over neoplasm though both remain considerations (had been off abx)  Mult blood cultures neg  CHAS neg 3/15 and 5/24  MRI 6/22 worsening CNS lesions  S/p right craniotomy and resection of mass with biopsy on 6/28. Biopsy-per note fungal appearing elements seen per Neurosurgery-discussed with pathologist who examined the frozen section and there were no fungal appearing elements.   +necrotizing granulomas. All stains negative in EPIC  Fungal culture- negative to date  Bacterial cultures-negative to date  AFB culture-in progress  Pathology- focal necrotizing granulomatous inflammation; no fungal elements or acid-fast bacilli on stains.  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 but we have no confirmed tissue diagnosis  Continue pyridoxine (B6) while on isoniazid  Will need occasional testing AST/ALT, both so far within normal limits  Follow-up brain biopsy specimen sent to [facility] for PCR testing, bacterial, fungal, AFB   Follow-up serologic testing sent out to " "[facility]  Brucella ab - negative  Coxiella ab - negative   Histo ab serum & antigen urine-negative  Blasto ab - negative       --- Discussed MRI findings with radiologist and there is easily obtained tissue/fluid in the sacrum and area that has been worsening despite therapy, per radiology images most consistent to TB or Brucellosis  -Pelvic biopsy on 7/11 - fluid drainage and specimen sent to [facility] again for AFB, bacterial and fungal, crypto and cocci PCR-confirmed with Alex this was sent out on 7/12   Pathology  - No definite acid fast or fungal organisms are seen.     Gluteal and iliopsoas abscess   OM L5, S1-3, new this admission  Recurrent abscesses  Adjacent to hardware in right hip (placed 12/17/18)  CT 4/23 \"Fluid collections within the right gluteal and iliacis muscles.. The collection within the right gluteal muscle has unchanged while the fluid collection within the right iliacis muscle is increased somewhat in size.\" 2.7 cm  Cultures 3/29 and 4/4 neg  MRI on 5/20/2019 - interval decrease in collection in R gluteal muscle and persistent multiloculated collection in R iliacus muscle.   CT 5/28 no change  s/p drainage on 5/30/2019-cultures negative  S/p removal hardware 6/5-no cultures done  1, 3 beta glucan neg  CT on 6/8 with residual abscess in the right iliacus muscle, 2.5 x 1.8 cm, slightly smaller than prior  s/p CT-guided deep bone biopsy 6/19/2019.  Cultures remain negative; path not reported   MRI 6/28 chronic discitis, diffuse OM L5, 3 and 4 cm abscesses right glute, 1.5 cm abscess or necrosis right posterior ileum, 3.3 cm abscess right SI joint  Continue TB therapy     Right gluteal pseudoaneurysm  significant interval enlargement noted by IR during procedure on 7/11  -CTA planned      +QuantiGold  Query disseminated TB  Path with necrotizing granulomas  AFB stain neg  AFB cultures pending  All prior AFB cxs still neg  Started RIPE +B6 7/3  Monitor for visual changes while on ethambutol.  None " currently. Please ask if ophthalmology can get baseline vision as well as color vision testing done while inpatient-pending     Type 2 DM  Hemoglobin A1c 6.3   Keep BS under 150 to help control current infection    Do not recommend transfer to outside facility until outside testing is complete and there is a plan of care.     Discussed with IM RN

## 2019-07-19 NOTE — PROGRESS NOTES
Pt A/Ox2-3, occasionally disoriented to time and event. Speech mumbled. Primarily Portuguese speaking but speaks english as well. C/o severe pain to bilateral hips- medicated per MAR. Call light within reach. Bed locked and in lowest position. Bed alarm on. Room near nurses station. Rounding maintained.

## 2019-07-19 NOTE — PROGRESS NOTES
[facility] Medicine Daily Progress Note    Date of Service  7/19/2019    Chief Complaint  68 y.o. female admitted 5/29/2019 with right iliac and gluteus abscess    Hospital Course  Admitted with right iliac gluteus abscess, known history of brain lesions with increasing enhancement noted on MRI.  Possible chronic osteomyelitis of the lumbar spine.    Interval Problem Update  Right iliac/gluteus abscess - cultures have been negative  Brain lesions - pathology showed focal necrotizing granulomatous inflammation with a marked histiocytic infiltrate, culture pending  HTN - sbp 130-150  Pseudoaneurysm - recommendation was to do CTA for reevaluation    Consultants/Specialty  ID  Neurosurgery    Code Status  Full    Disposition  TBD    Review of Systems  Review of Systems   Constitutional: Negative for chills, fever and malaise/fatigue.   HENT: Negative for hearing loss and sore throat.    Eyes: Negative for blurred vision.   Respiratory: Negative for cough, shortness of breath and wheezing.    Cardiovascular: Negative for chest pain, palpitations and leg swelling.   Gastrointestinal: Negative for abdominal pain, diarrhea, heartburn, nausea and vomiting.   Genitourinary: Negative for dysuria.   Musculoskeletal: Negative for back pain, joint pain and neck pain.   Skin: Negative for rash.   Neurological: Negative for dizziness, sensory change, speech change, focal weakness, weakness and headaches.   Psychiatric/Behavioral: The patient is not nervous/anxious.         Physical Exam  Temp:  [36.4 °C (97.5 °F)-37 °C (98.6 °F)] 36.9 °C (98.4 °F)  Pulse:  [77-89] 78  Resp:  [16-17] 16  BP: (136-158)/(67-73) 136/73  SpO2:  [92 %-94 %] 94 %    Physical Exam   Constitutional: She is oriented to person, place, and time. She appears well-developed and well-nourished.   HENT:   Head: Normocephalic.   Incision on right parietal area   Eyes: Pupils are equal, round, and reactive to light. Conjunctivae are normal.   Cardiovascular: Normal  rate and regular rhythm.    Pulmonary/Chest: Effort normal and breath sounds normal.   Abdominal: Soft. Bowel sounds are normal. She exhibits no distension. There is no tenderness.   Musculoskeletal: She exhibits no edema.   Neurological: She is alert and oriented to person, place, and time. No cranial nerve deficit.   MMT 5/5   Skin: Skin is warm and dry.   Nursing note and vitals reviewed.      Fluids    Intake/Output Summary (Last 24 hours) at 07/19/19 1404  Last data filed at 07/18/19 1700   Gross per 24 hour   Intake              300 ml   Output                0 ml   Net              300 ml       Laboratory  Recent Labs      07/17/19   0509   WBC  4.9   RBC  3.42*   HEMOGLOBIN  10.0*   HEMATOCRIT  32.1*   MCV  93.9   MCH  29.2   MCHC  31.2*   RDW  55.7*   PLATELETCT  274   MPV  8.9*     Recent Labs      07/17/19   0300   SODIUM  137   POTASSIUM  4.3   CHLORIDE  106   CO2  24   GLUCOSE  89   BUN  9   CREATININE  0.71   CALCIUM  9.7                   Imaging  MR-BRAIN-WITH & W/O   Final Result      1.  Innumerable nodular enhancing lesions throughout the brain parenchyma both the supra and infratentorial compartments predominantly near the gray-white junction. There has been interval increase in size of several of these lesions since previous exam.    These changes may be secondary to metastatic disease or granulomatous disease.      2.  Interval right parietal craniotomy with underlying elliptical postsurgical defect.      3.  Interval increase in size of index right thalamic lesion which has increased vasogenic edema since previous exam.      4.  Age-related cerebral atrophy.      IR-PICC LINE PLACEMENT W/ GUIDANCE > AGE 5   Final Result                  Ultrasound-guided PICC placement performed by qualified nursing staff as    above.          CT-NEEDLE BX-ABD-RETROPERITONL   Final Result      1.  CT GUIDED biopsy of a right pelvic phlegmon.      2. Significant interval enlargement of a right gluteal  pseudoaneurysm. CTA and consideration for possible intervention either with direct thrombin injection or endovascular treatment was suggested. This was conveyed to Dr. Alatorre on 7/11/2019 via [place]    text at 1750 hours.      LT-GMEVQUU-2 VIEW   Final Result         1.  Moderate stool in the colon suggests changes of constipation, otherwise nonspecific bowel gas pattern   2.  Atherosclerosis      US-EXTREMITY VENOUS LOWER BILAT   Final Result      IR-US GUIDED PIV   Final Result    Ultrasound-guided PERIPHERAL IV INSERTION performed by    qualified nursing staff as above.            MR-LUMBAR SPINE-WITH & W/O   Final Result         1.  Grade 2-3 spondylolisthesis at the L5-S1 level with bilateral spondylolysis of the pars interarticularis. This causes severe bilateral neural foraminal stenosis at this level.      2.  Interval removal of posterior fusion hardware at the lumbosacral junction.      3.  Diffuse osteomyelitis involving the L5 vertebral body and the first 3 sacral segments.      4.  Chronic discitis at the L5-S1 level with anterior paraspinous abscess at this level and anterior to the S1 sacral segment.      5.  Smaller intraosseous bone bone abscesses or areas of necrosis noted in the sacrum.      6.  There is also evidence of osteomyelitis involving the jose antonio and sacrum bilaterally along the course of the previous sacral fixation screw. There is a large 4 cm abscess noted in the right gluteal soft tissues in a smaller 3 cm chronic appearing    abscess in the left gluteal musculature.      7.  There is a 1.5 cm intraosseous abscess or area of necrosis in the right posterior ilium.      8.  There is a 3.3 cm centimeters multiloculated abscess anterior to the right sacroiliac joint.      MR-STEALTH BRAIN WITH & W/O   Final Result         1.  Innumerable subcentimeter brain metastases, many at the gray-white junction but also multiple noted throughout the basal ganglia and brainstem.      2.  Largest index  lesion is noted in the right thalamus and has increased in size since previous exam and currently measures 9 mm and has a moderate amount of surrounding vasogenic edema.      US-EXTREMITY NON VASCULAR UNILATERAL RIGHT   Final Result      No right hip joint effusion. No soft tissue fluid collection.      MR-BRAIN-WITH & W/O   Final Result      1.  Innumerable supra and infratentorial enhancing nodules are again noted throughout the brain parenchyma with multiple lesions appearing somewhat larger and with increased enhancement. No associated diffusion restriction. Unchanged differential    considerations including bacterial or fungal infection versus metastatic disease.   2.  Mild diffuse cerebral substance loss.   3.  Mild microangiopathic ischemic change versus demyelination or gliosis.      CT-NEEDLE BX-DEEP BONE   Final Result      CT GUIDED RIGHT ILIUM DEEP BONE BIOPSY. SAMPLES WERE SENT TO MICROBIOLOGY FOR ANALYSIS.      CT-PELVIS WITH   Final Result         1.  No significant interval change in the size of the RIGHT iliac muscle fluid collections   2.  Hyperdense RIGHT gluteal lesion moderately suspicious for pseudoaneurysm with adjacent hematoma.   3.  Changes in the RIGHT iliac bone, BILATERAL sacrum and LEFT iliac bone suspicious for osteomyelitis   4.  Changes at L5-S1 suspicious for discitis osteomyelitis      CT-PELVIS WITH   Final Result      1.  Residual or recurrent abscess within the right iliacus muscle measuring 2.5 x 1.8 cm. It slightly smaller than on 5/28/2019      2.  Interval removal of hardware from the iliac bones and sacrum      3.  Lytic change of the right iliac bone and the sacrum. This could be related to hardware versus osteomyelitis.      4.  Subacute fractures of the right ilium, sacrum, and there is lucency within the left iliac bone that is likely from hardware      MR-BRAIN-WITH & W/O   Final Result      1.  Interval progression of supra and infratentorial intra-axial nodules and  associated edema. This short-term interval progression favors infection over neoplasm though both remain considerations.   2.  Mild atrophy      DX-PORTABLE FLUOROSCOPY < 1 HOUR   Final Result         Portable fluoroscopy utilized for 2 minutes.      DX-PELVIS-1 OR 2 VIEWS   Final Result      Status post hardware removal from the right SI joint      DX-CHEST-PORTABLE (1 VIEW)   Final Result      Interstitial prominence could be due to pulmonary edema or hypoventilatory change.      DX-CHEST-LIMITED (1 VIEW)   Final Result      LEFT basilar underinflation atelectasis or pneumonia      CT-DRAIN-HEMATOMA - SEROMA   Final Result      CT-guided RIGHT pelvic fluid collection aspiration, laboratory evaluation pending         CTA ABDOMEN PELVIS W & W/O POST PROCESS    (Results Pending)        Assessment/Plan  * Brain lesion- (present on admission)   Assessment & Plan    Right-sided craniotomy for resection of superficial mass  6/28/2019  Isoniazid, Rifampin, Ethambutol, Pyrazinamide  Keppra for seizure prophylaxis     Granulomatous disease - (present on admission)   Assessment & Plan    Isoniazid, Rifampin, Ethambutol, Pyrazinamide     Sepsis (HCC)- (present on admission)   Assessment & Plan    This is sepsis (without associated acute organ dysfunction).    Source - Right Iliac and Gluteus medius abscess     Abscess of right iliac muscle and right gluteus medius muscle- (present on admission)   Assessment & Plan    CT guided pelvic aspiration 5/30/2019  Hardware removal, pelvis 6/5/2019  CT guided deep bone biopsy 6/19/2019  CT guided aspiration/biopsy of a R gluteal fluid collection/phlegmon 7/11/2019  Isoniazid, Rifampin, Ethambutol, Pyrazinamide       Hypokalemia- (present on admission)   Assessment & Plan    Kdur, follow bmp     Osteomyelitis (HCC)- (present on admission)   Assessment & Plan    Chronic?     Pseudoaneurysm following procedure (HCC)- (present on admission)   Assessment & Plan    Check CTA of abd/pelvis      Hypomagnesemia- (present on admission)   Assessment & Plan    Oral magnesium     Dysphagia- (present on admission)   Assessment & Plan    Dysphagia 3  SLP to follow     Hypercalcemia- (present on admission)   Assessment & Plan    Follow bmp     Essential hypertension- (present on admission)   Assessment & Plan    Metoprolol, Losartan and Amlodipine     Chronic hyponatremia- (present on admission)   Assessment & Plan    Follow BMP     COPD (chronic obstructive pulmonary disease) (HCC)- (present on admission)   Assessment & Plan    RT protocol     Non-severe protein-calorie malnutrition (HCC)- (present on admission)   Assessment & Plan    Nutrition consult       History of DVT (deep vein thrombosis)- (present on admission)   Assessment & Plan    Hold Xarelto     RLS (restless legs syndrome)- (present on admission)   Assessment & Plan    Pramipexole     Chronic pain- (present on admission)   Assessment & Plan    Pain control       History of breast cancer- (present on admission)   Assessment & Plan    history left mastectomy and breast implant.  Follow-up on intraoperative final pathology results          VTE prophylaxis: Heparin

## 2019-07-20 PROCEDURE — A9270 NON-COVERED ITEM OR SERVICE: HCPCS | Performed by: FAMILY MEDICINE

## 2019-07-20 PROCEDURE — 770001 HCHG ROOM/CARE - MED/SURG/GYN PRIV*

## 2019-07-20 PROCEDURE — 99232 SBSQ HOSP IP/OBS MODERATE 35: CPT | Performed by: HOSPITALIST

## 2019-07-20 PROCEDURE — 700102 HCHG RX REV CODE 250 W/ 637 OVERRIDE(OP): Performed by: FAMILY MEDICINE

## 2019-07-20 PROCEDURE — A9270 NON-COVERED ITEM OR SERVICE: HCPCS | Performed by: HOSPITALIST

## 2019-07-20 PROCEDURE — 700102 HCHG RX REV CODE 250 W/ 637 OVERRIDE(OP): Performed by: HOSPITALIST

## 2019-07-20 PROCEDURE — 700102 HCHG RX REV CODE 250 W/ 637 OVERRIDE(OP): Performed by: INTERNAL MEDICINE

## 2019-07-20 PROCEDURE — 700101 HCHG RX REV CODE 250: Performed by: FAMILY MEDICINE

## 2019-07-20 PROCEDURE — A9270 NON-COVERED ITEM OR SERVICE: HCPCS | Performed by: INTERNAL MEDICINE

## 2019-07-20 PROCEDURE — 700111 HCHG RX REV CODE 636 W/ 250 OVERRIDE (IP): Performed by: HOSPITALIST

## 2019-07-20 RX ADMIN — MAGNESIUM 64 MG (MAGNESIUM CHLORIDE) TABLET,DELAYED RELEASE 128 MG: at 04:37

## 2019-07-20 RX ADMIN — OXYCODONE HYDROCHLORIDE 5 MG: 5 TABLET ORAL at 11:46

## 2019-07-20 RX ADMIN — SUCRALFATE 1 G: 1 SUSPENSION ORAL at 04:38

## 2019-07-20 RX ADMIN — POTASSIUM CHLORIDE 20 MEQ: 20 TABLET, EXTENDED RELEASE ORAL at 04:39

## 2019-07-20 RX ADMIN — OXYCODONE HYDROCHLORIDE 10 MG: 5 TABLET ORAL at 04:51

## 2019-07-20 RX ADMIN — SUCRALFATE 1 G: 1 SUSPENSION ORAL at 11:46

## 2019-07-20 RX ADMIN — SUCRALFATE 1 G: 1 SUSPENSION ORAL at 17:36

## 2019-07-20 RX ADMIN — METOPROLOL TARTRATE 25 MG: 25 TABLET, FILM COATED ORAL at 04:39

## 2019-07-20 RX ADMIN — PRAMIPEXOLE DIHYDROCHLORIDE 0.25 MG: 0.5 TABLET ORAL at 11:47

## 2019-07-20 RX ADMIN — HEPARIN SODIUM 5000 UNITS: 5000 INJECTION, SOLUTION INTRAVENOUS; SUBCUTANEOUS at 04:38

## 2019-07-20 RX ADMIN — SENNOSIDES, DOCUSATE SODIUM 2 TABLET: 50; 8.6 TABLET, FILM COATED ORAL at 17:37

## 2019-07-20 RX ADMIN — PYRAZINAMIDE 1000 MG: 500 TABLET ORAL at 04:38

## 2019-07-20 RX ADMIN — CINACALCET HYDROCHLORIDE 60 MG: 30 TABLET, COATED ORAL at 04:37

## 2019-07-20 RX ADMIN — GABAPENTIN 400 MG: 400 CAPSULE ORAL at 17:37

## 2019-07-20 RX ADMIN — LIDOCAINE 2 PATCH: 50 PATCH CUTANEOUS at 11:46

## 2019-07-20 RX ADMIN — PRAMIPEXOLE DIHYDROCHLORIDE 0.25 MG: 0.5 TABLET ORAL at 04:36

## 2019-07-20 RX ADMIN — LOSARTAN POTASSIUM 75 MG: 50 TABLET ORAL at 04:39

## 2019-07-20 RX ADMIN — PYRIDOXINE HCL TAB 50 MG 100 MG: 50 TAB at 04:38

## 2019-07-20 RX ADMIN — CYCLOBENZAPRINE 10 MG: 10 TABLET, FILM COATED ORAL at 16:57

## 2019-07-20 RX ADMIN — PRAMIPEXOLE DIHYDROCHLORIDE 0.25 MG: 0.5 TABLET ORAL at 17:36

## 2019-07-20 RX ADMIN — OXYCODONE HYDROCHLORIDE 20 MG: 20 TABLET, FILM COATED, EXTENDED RELEASE ORAL at 06:00

## 2019-07-20 RX ADMIN — Medication 1 CAPSULE: at 10:00

## 2019-07-20 RX ADMIN — LEVETIRACETAM 500 MG: 500 TABLET, FILM COATED ORAL at 04:39

## 2019-07-20 RX ADMIN — OXYCODONE HYDROCHLORIDE 20 MG: 20 TABLET, FILM COATED, EXTENDED RELEASE ORAL at 17:56

## 2019-07-20 RX ADMIN — ETHAMBUTOL HYDROCHLORIDE 800 MG: 400 TABLET, FILM COATED ORAL at 04:38

## 2019-07-20 RX ADMIN — RIFAMPIN 300 MG: 300 CAPSULE ORAL at 17:36

## 2019-07-20 RX ADMIN — TEMAZEPAM 15 MG: 15 CAPSULE ORAL at 21:03

## 2019-07-20 RX ADMIN — RIFAMPIN 300 MG: 300 CAPSULE ORAL at 04:37

## 2019-07-20 RX ADMIN — HEPARIN SODIUM 5000 UNITS: 5000 INJECTION, SOLUTION INTRAVENOUS; SUBCUTANEOUS at 17:37

## 2019-07-20 RX ADMIN — ACETAMINOPHEN 1000 MG: 500 TABLET ORAL at 04:38

## 2019-07-20 RX ADMIN — MAGNESIUM 64 MG (MAGNESIUM CHLORIDE) TABLET,DELAYED RELEASE 128 MG: at 17:36

## 2019-07-20 RX ADMIN — ACETAMINOPHEN 1000 MG: 500 TABLET ORAL at 17:37

## 2019-07-20 RX ADMIN — GABAPENTIN 400 MG: 400 CAPSULE ORAL at 04:39

## 2019-07-20 RX ADMIN — OMEPRAZOLE 20 MG: 20 CAPSULE, DELAYED RELEASE ORAL at 04:37

## 2019-07-20 RX ADMIN — ISONIAZID 300 MG: 300 TABLET ORAL at 04:38

## 2019-07-20 RX ADMIN — ATORVASTATIN CALCIUM 10 MG: 10 TABLET, FILM COATED ORAL at 17:37

## 2019-07-20 RX ADMIN — LEVETIRACETAM 500 MG: 500 TABLET, FILM COATED ORAL at 17:37

## 2019-07-20 RX ADMIN — AMLODIPINE BESYLATE 10 MG: 5 TABLET ORAL at 04:38

## 2019-07-20 RX ADMIN — GABAPENTIN 400 MG: 400 CAPSULE ORAL at 11:46

## 2019-07-20 RX ADMIN — FERROUS SULFATE TAB 325 MG (65 MG ELEMENTAL FE) 325 MG: 325 (65 FE) TAB at 10:00

## 2019-07-20 RX ADMIN — METOPROLOL TARTRATE 25 MG: 25 TABLET, FILM COATED ORAL at 17:37

## 2019-07-20 ASSESSMENT — ENCOUNTER SYMPTOMS
COUGH: 0
VOMITING: 0
WEAKNESS: 0
NECK PAIN: 0
HEADACHES: 0
BACK PAIN: 0
ABDOMINAL PAIN: 0
NAUSEA: 0
PALPITATIONS: 0
BLURRED VISION: 0
NERVOUS/ANXIOUS: 0
SORE THROAT: 0
FEVER: 0
DIZZINESS: 0
SHORTNESS OF BREATH: 0
SENSORY CHANGE: 0
HEARTBURN: 0
SPEECH CHANGE: 0
FOCAL WEAKNESS: 0
DIARRHEA: 0
WHEEZING: 0
CHILLS: 0

## 2019-07-20 NOTE — THERAPY
"Speech Language Therapy Evaluation completed to address speech, communication and cognition    Functional Status: Patient was seen on this date for a cognitive-linguistic evaluation. Session performed in Amharic by this native Amharic speaker. Patient with delayed response to verbal stimuli and abandoned phrases resulting in dysfluencies in speech. Speech was most intact during \"cookie theft\" task - no grammatical errors or word finding deficits. She was AAOx3 with confusion regarding reason for admit.The Domenic Cognitive Assessment (MOCA) - Amharic version, story retell portion of Cognitive Linguistic Quick Test (CLQT), and portions of other non standardized measures were administered. The patient scored 9/30 on the MOCA placing her significantly below normal limits (cutoff range for normal is >26/30). Patient recalled 9/18 story grammar elements and answered questions to short paragraph correctly in 4/6 trials. Severe deficits noted in attention to task, immediate and short term memory (1/5 words recalled with 5 min delay, 0/4 given categorical cue, and 2/4 given an option of 3), generative naming (4 items in 1 minute), executive functions, visuospatial skills, clock drawing, simple arithmetic, reasoning, and writing. Clock drawing revealed perseveration of numbers x4, poor planning, and impaired hand placement. Poor attention to task affecting performance during writing task as seen by writing drifting up the page and becoming increasingly unintelligible/incoherent when writing her name.     Recommendations: At this time, patient is presenting with severe cognitive deficits and is NOT at the level for independent living. Recommend 24-hour supervision upon discharge and speech therapy at the acute and post acute level of care to address deficits stated above.     Plan of Care: Will benefit from Speech Therapy 3 times per week    Post-Acute Therapy: Recommend inpatient transitional care services for continued " "speech therapy services.      See \"Rehab Therapy-Acute\" Patient Summary Report for complete documentation. Thank you for the consult.   "

## 2019-07-20 NOTE — PROGRESS NOTES
[facility] Medicine Daily Progress Note    Date of Service  7/20/2019    Chief Complaint  68 y.o. female admitted 5/29/2019 with right iliac and gluteus abscess    Hospital Course  Patient is a 68 year old with history of breast cancer, copd, dm, gerd, dl and htn who was admitted with right iliac gluteus abscess.  She also has a known history of brain lesions with increasing enhancement noted on MRI and possible chronic osteomyelitis of the lumbar spine.    Interval Problem Update  Right iliac/gluteus abscess - cultures have been negative  Brain lesions - pathology showed focal necrotizing granulomatous inflammation with a marked histiocytic infiltrate, culture is still pending  She is axox2, per nursing her mentation waxes and wanes  Pseudoaneurysm - recommendation was to do CTA for reevaluation  Unable to cooperate with optho baseline exam at this time  Denies pain    Consultants/Specialty  ID  Neurosurgery    Code Status  Full    Disposition  TBD    Review of Systems  Review of Systems   Constitutional: Negative for chills, fever and malaise/fatigue.   HENT: Negative for hearing loss and sore throat.    Eyes: Negative for blurred vision.   Respiratory: Negative for cough, shortness of breath and wheezing.    Cardiovascular: Negative for chest pain, palpitations and leg swelling.   Gastrointestinal: Negative for abdominal pain, diarrhea, heartburn, nausea and vomiting.   Genitourinary: Negative for dysuria.   Musculoskeletal: Negative for back pain, joint pain and neck pain.   Skin: Negative for rash.   Neurological: Negative for dizziness, sensory change, speech change, focal weakness, weakness and headaches.   Psychiatric/Behavioral: The patient is not nervous/anxious.         Physical Exam  Temp:  [36.3 °C (97.3 °F)-37.1 °C (98.8 °F)] 37.1 °C (98.8 °F)  Pulse:  [82-98] 93  Resp:  [16-20] 20  BP: (138-174)/(64-83) 138/64  SpO2:  [91 %-100 %] 91 %    Physical Exam   Constitutional: She appears well-developed and  well-nourished. No distress.   HENT:   Head: Normocephalic and atraumatic.   Mouth/Throat: Oropharynx is clear and moist.   Surgical site cdi   Eyes: Pupils are equal, round, and reactive to light. Conjunctivae are normal.   Neck: Normal range of motion. Neck supple.   Cardiovascular: Normal rate, regular rhythm, normal heart sounds and intact distal pulses.  Exam reveals no gallop and no friction rub.    No murmur heard.  Pulmonary/Chest: Effort normal and breath sounds normal. No respiratory distress. She has no wheezes. She has no rales. She exhibits no tenderness.   Abdominal: Soft. Bowel sounds are normal. She exhibits no distension and no mass. There is no tenderness. There is no rebound and no guarding.   Musculoskeletal: Normal range of motion. She exhibits no edema or tenderness.   Neurological: She is alert. She has normal reflexes. No cranial nerve deficit. She exhibits normal muscle tone. Coordination normal.   Skin: Skin is warm and dry. No rash noted. She is not diaphoretic. No erythema. No pallor.   Nursing note and vitals reviewed.      Fluids    Intake/Output Summary (Last 24 hours) at 07/20/19 1035  Last data filed at 07/20/19 0000   Gross per 24 hour   Intake              240 ml   Output                0 ml   Net              240 ml       Laboratory                        Imaging  CTA ABDOMEN PELVIS W & W/O POST PROCESS   Final Result      1.  Interval enlargement of a hyperdense ovoid masslike structure immediately posterior to the right ilium, possibly representing a pseudoaneurysm.   2.  Stable thin-walled apparent fluid-filled structure immediately deep to the right ilium.   3.   Minimal retroperitoneal adenopathy, grossly stable.   4.  Cholelithiasis.      MR-BRAIN-WITH & W/O   Final Result      1.  Innumerable nodular enhancing lesions throughout the brain parenchyma both the supra and infratentorial compartments predominantly near the gray-white junction. There has been interval increase in  size of several of these lesions since previous exam.    These changes may be secondary to metastatic disease or granulomatous disease.      2.  Interval right parietal craniotomy with underlying elliptical postsurgical defect.      3.  Interval increase in size of index right thalamic lesion which has increased vasogenic edema since previous exam.      4.  Age-related cerebral atrophy.      IR-PICC LINE PLACEMENT W/ GUIDANCE > AGE 5   Final Result                  Ultrasound-guided PICC placement performed by qualified nursing staff as    above.          CT-NEEDLE BX-ABD-RETROPERITONL   Final Result      1.  CT GUIDED biopsy of a right pelvic phlegmon.      2. Significant interval enlargement of a right gluteal pseudoaneurysm. CTA and consideration for possible intervention either with direct thrombin injection or endovascular treatment was suggested. This was conveyed to Dr. Alatorre on 7/11/2019 via [facility]    text at 1750 hours.      [id number] VIEW   Final Result         1.  Moderate stool in the colon suggests changes of constipation, otherwise nonspecific bowel gas pattern   2.  Atherosclerosis      US-EXTREMITY VENOUS LOWER BILAT   Final Result      IR-US GUIDED PIV   Final Result    Ultrasound-guided PERIPHERAL IV INSERTION performed by    qualified nursing staff as above.            MR-LUMBAR SPINE-WITH & W/O   Final Result         1.  Grade 2-3 spondylolisthesis at the L5-S1 level with bilateral spondylolysis of the pars interarticularis. This causes severe bilateral neural foraminal stenosis at this level.      2.  Interval removal of posterior fusion hardware at the lumbosacral junction.      3.  Diffuse osteomyelitis involving the L5 vertebral body and the first 3 sacral segments.      4.  Chronic discitis at the L5-S1 level with anterior paraspinous abscess at this level and anterior to the S1 sacral segment.      5.  Smaller intraosseous bone bone abscesses or areas of necrosis noted in the sacrum.       6.  There is also evidence of osteomyelitis involving the jose antonio and sacrum bilaterally along the course of the previous sacral fixation screw. There is a large 4 cm abscess noted in the right gluteal soft tissues in a smaller 3 cm chronic appearing    abscess in the left gluteal musculature.      7.  There is a 1.5 cm intraosseous abscess or area of necrosis in the right posterior ilium.      8.  There is a 3.3 cm centimeters multiloculated abscess anterior to the right sacroiliac joint.      MR-STEALTH BRAIN WITH & W/O   Final Result         1.  Innumerable subcentimeter brain metastases, many at the gray-white junction but also multiple noted throughout the basal ganglia and brainstem.      2.  Largest index lesion is noted in the right thalamus and has increased in size since previous exam and currently measures 9 mm and has a moderate amount of surrounding vasogenic edema.      US-EXTREMITY NON VASCULAR UNILATERAL RIGHT   Final Result      No right hip joint effusion. No soft tissue fluid collection.      MR-BRAIN-WITH & W/O   Final Result      1.  Innumerable supra and infratentorial enhancing nodules are again noted throughout the brain parenchyma with multiple lesions appearing somewhat larger and with increased enhancement. No associated diffusion restriction. Unchanged differential    considerations including bacterial or fungal infection versus metastatic disease.   2.  Mild diffuse cerebral substance loss.   3.  Mild microangiopathic ischemic change versus demyelination or gliosis.      CT-NEEDLE BX-DEEP BONE   Final Result      CT GUIDED RIGHT ILIUM DEEP BONE BIOPSY. SAMPLES WERE SENT TO MICRO[facility]OLOGY FOR ANALYSIS.      CT-PELVIS WITH   Final Result         1.  No significant interval change in the size of the RIGHT iliac muscle fluid collections   2.  Hyperdense RIGHT gluteal lesion moderately suspicious for pseudoaneurysm with adjacent hematoma.   3.  Changes in the RIGHT iliac bone, BILATERAL sacrum  and LEFT iliac bone suspicious for osteomyelitis   4.  Changes at L5-S1 suspicious for discitis osteomyelitis      CT-PELVIS WITH   Final Result      1.  Residual or recurrent abscess within the right iliacus muscle measuring 2.5 x 1.8 cm. It slightly smaller than on 5/28/2019      2.  Interval removal of hardware from the iliac bones and sacrum      3.  Lytic change of the right iliac bone and the sacrum. This could be related to hardware versus osteomyelitis.      4.  Subacute fractures of the right ilium, sacrum, and there is lucency within the left iliac bone that is likely from hardware      MR-BRAIN-WITH & W/O   Final Result      1.  Interval progression of supra and infratentorial intra-axial nodules and associated edema. This short-term interval progression favors infection over neoplasm though both remain considerations.   2.  Mild atrophy      DX-PORTABLE FLUOROSCOPY < 1 HOUR   Final Result         Portable fluoroscopy utilized for 2 minutes.      DX-PELVIS-1 OR 2 VIEWS   Final Result      Status post hardware removal from the right SI joint      DX-CHEST-PORTABLE (1 VIEW)   Final Result      Interstitial prominence could be due to pulmonary edema or hypoventilatory change.      DX-CHEST-LIMITED (1 VIEW)   Final Result      LEFT basilar underinflation atelectasis or pneumonia      CT-DRAIN-HEMATOMA - SEROMA   Final Result      CT-guided RIGHT pelvic fluid collection aspiration, laboratory evaluation pending              Assessment/Plan  * Brain lesion- (present on admission)   Assessment & Plan    Right-sided craniotomy for resection of superficial mass  6/28/2019  Continue Isoniazid, Rifampin, Ethambutol, Pyrazinamide per ID  Continue Keppra for seizure prophylaxis     Granulomatous disease - (present on admission)   Assessment & Plan    Isoniazid, Rifampin, Ethambutol, Pyrazinamide     Sepsis (HCC)- (present on admission)   Assessment & Plan    This is sepsis (without associated acute organ dysfunction).     Source - Right Iliac and Gluteus medius abscess     Abscess of right iliac muscle and right gluteus medius muscle- (present on admission)   Assessment & Plan    CT guided pelvic aspiration 5/30/2019  Hardware removal, pelvis 6/5/2019  CT guided deep bone biopsy 6/19/2019  CT guided aspiration/biopsy of a R gluteal fluid collection/phlegmon 7/11/2019  Isoniazid, Rifampin, Ethambutol, Pyrazinamide       Hypokalemia- (present on admission)   Assessment & Plan    Will recheck     Osteomyelitis (HCC)- (present on admission)   Assessment & Plan    Chronic?     Pseudoaneurysm following procedure (HCC)- (present on admission)   Assessment & Plan    CTA of abd/pelvis penidng     Hypomagnesemia- (present on admission)   Assessment & Plan    Oral magnesium     Dysphagia- (present on admission)   Assessment & Plan    Dysphagia 3  Speech following     Hypercalcemia- (present on admission)   Assessment & Plan    Will recheck     Essential hypertension- (present on admission)   Assessment & Plan    Continue Metoprolol, Losartan and Amlodipine as tolerated     Chronic hyponatremia- (present on admission)   Assessment & Plan    Follow intermittently     COPD (chronic obstructive pulmonary disease) (HCC)- (present on admission)   Assessment & Plan    RT protocol     Non-severe protein-calorie malnutrition (HCC)- (present on admission)   Assessment & Plan    Nutrition consult       History of DVT (deep vein thrombosis)- (present on admission)   Assessment & Plan    Hold Xarelto     RLS (restless legs syndrome)- (present on admission)   Assessment & Plan    Pramipexole     Chronic pain- (present on admission)   Assessment & Plan    Pain control       History of breast cancer- (present on admission)   Assessment & Plan    history left mastectomy and breast implant.  Intraoperative final pathology results pending          VTE prophylaxis: Heparin

## 2019-07-20 NOTE — THERAPY
"Occupational Therapy Treatment completed with focus on ADLs, ADL transfers and patient education.  Functional Status: Supine > < EOB supv, transfers without AD min A, UB dressing mod A, LB dressing max A, toileting max A  Plan of Care: Will benefit from Occupational Therapy 3 times per week  Discharge Recommendations:  Equipment Will Continue to Assess for Equipment Needs. Post-acute therapy: Recommend post-acute placement for additional occupational therapy services prior to discharge home. Patient can tolerate post-acute therapies at a 5x/week frequency.    See \"Rehab Therapy-Acute\" Patient Summary Report for complete documentation.     Pt seen for OT tx. Received having mobilized self to EOB, tangled in hospital gown, setting off bed alarm. Pt had been incontinent of urine and requested BSC. Pt completed stand-pivot transfers with therapist assist (no AD), attempted further urination. Up to chair for gown change and LB dressing. Pt able to tailor sit bilaterally, but made limited effort to participate in sock donning, stating \"I can't!\" Required max A for LB dressing. Pt grunting throughout session, stating she had hip pain. Trained on breath control with limited carryover. Pt refused to remain up in chair due to pain. Assisted back to bed. Pt continues to be limited by pain, generalized weakness, cognitive deficits (sequencing, problem solving). Acute OT continue to follow to maximize functional independence.   "

## 2019-07-20 NOTE — CARE PLAN
Problem: Safety  Goal: Will remain free from injury  Outcome: PROGRESSING AS EXPECTED  P. Instructed on fall protocol rational    Problem: Skin Integrity  Goal: Risk for impaired skin integrity will decrease  Outcome: PROGRESSING AS EXPECTED  Pt. Educated on need to limit moisture to skin contact and use of [place] to maintain skin integrity.

## 2019-07-21 LAB
TEST NAME 95000: NORMAL

## 2019-07-21 PROCEDURE — A9270 NON-COVERED ITEM OR SERVICE: HCPCS | Performed by: FAMILY MEDICINE

## 2019-07-21 PROCEDURE — 700102 HCHG RX REV CODE 250 W/ 637 OVERRIDE(OP): Performed by: FAMILY MEDICINE

## 2019-07-21 PROCEDURE — 700102 HCHG RX REV CODE 250 W/ 637 OVERRIDE(OP): Performed by: HOSPITALIST

## 2019-07-21 PROCEDURE — A9270 NON-COVERED ITEM OR SERVICE: HCPCS | Performed by: HOSPITALIST

## 2019-07-21 PROCEDURE — 700101 HCHG RX REV CODE 250: Performed by: FAMILY MEDICINE

## 2019-07-21 PROCEDURE — 700102 HCHG RX REV CODE 250 W/ 637 OVERRIDE(OP): Performed by: INTERNAL MEDICINE

## 2019-07-21 PROCEDURE — 700111 HCHG RX REV CODE 636 W/ 250 OVERRIDE (IP): Performed by: HOSPITALIST

## 2019-07-21 PROCEDURE — 770001 HCHG ROOM/CARE - MED/SURG/GYN PRIV*

## 2019-07-21 PROCEDURE — A9270 NON-COVERED ITEM OR SERVICE: HCPCS | Performed by: INTERNAL MEDICINE

## 2019-07-21 PROCEDURE — 99232 SBSQ HOSP IP/OBS MODERATE 35: CPT | Performed by: HOSPITALIST

## 2019-07-21 PROCEDURE — 99232 SBSQ HOSP IP/OBS MODERATE 35: CPT | Performed by: INTERNAL MEDICINE

## 2019-07-21 RX ORDER — OXYCODONE HCL 10 MG/1
10 TABLET, FILM COATED, EXTENDED RELEASE ORAL EVERY 12 HOURS
Status: DISCONTINUED | OUTPATIENT
Start: 2019-07-21 | End: 2019-07-29

## 2019-07-21 RX ORDER — OXYCODONE HYDROCHLORIDE 5 MG/1
5 TABLET ORAL EVERY 6 HOURS PRN
Status: DISCONTINUED | OUTPATIENT
Start: 2019-07-21 | End: 2019-08-02

## 2019-07-21 RX ADMIN — METOPROLOL TARTRATE 25 MG: 25 TABLET, FILM COATED ORAL at 05:15

## 2019-07-21 RX ADMIN — CYCLOBENZAPRINE 10 MG: 10 TABLET, FILM COATED ORAL at 05:23

## 2019-07-21 RX ADMIN — LOSARTAN POTASSIUM 75 MG: 50 TABLET ORAL at 05:14

## 2019-07-21 RX ADMIN — GABAPENTIN 400 MG: 400 CAPSULE ORAL at 12:12

## 2019-07-21 RX ADMIN — SUCRALFATE 1 G: 1 SUSPENSION ORAL at 16:22

## 2019-07-21 RX ADMIN — TEMAZEPAM 15 MG: 15 CAPSULE ORAL at 20:39

## 2019-07-21 RX ADMIN — POTASSIUM CHLORIDE 20 MEQ: 20 TABLET, EXTENDED RELEASE ORAL at 05:15

## 2019-07-21 RX ADMIN — ISONIAZID 300 MG: 300 TABLET ORAL at 05:15

## 2019-07-21 RX ADMIN — OMEPRAZOLE 20 MG: 20 CAPSULE, DELAYED RELEASE ORAL at 05:14

## 2019-07-21 RX ADMIN — HEPARIN SODIUM 5000 UNITS: 5000 INJECTION, SOLUTION INTRAVENOUS; SUBCUTANEOUS at 05:14

## 2019-07-21 RX ADMIN — SENNOSIDES, DOCUSATE SODIUM 2 TABLET: 50; 8.6 TABLET, FILM COATED ORAL at 16:21

## 2019-07-21 RX ADMIN — PRAMIPEXOLE DIHYDROCHLORIDE 0.25 MG: 0.5 TABLET ORAL at 12:12

## 2019-07-21 RX ADMIN — OXYCODONE HYDROCHLORIDE 20 MG: 20 TABLET, FILM COATED, EXTENDED RELEASE ORAL at 05:48

## 2019-07-21 RX ADMIN — MAGNESIUM 64 MG (MAGNESIUM CHLORIDE) TABLET,DELAYED RELEASE 128 MG: at 05:14

## 2019-07-21 RX ADMIN — SUCRALFATE 1 G: 1 SUSPENSION ORAL at 12:12

## 2019-07-21 RX ADMIN — GABAPENTIN 400 MG: 400 CAPSULE ORAL at 16:22

## 2019-07-21 RX ADMIN — HEPARIN SODIUM 5000 UNITS: 5000 INJECTION, SOLUTION INTRAVENOUS; SUBCUTANEOUS at 16:21

## 2019-07-21 RX ADMIN — LIDOCAINE 2 PATCH: 50 PATCH CUTANEOUS at 12:12

## 2019-07-21 RX ADMIN — CYCLOBENZAPRINE 10 MG: 10 TABLET, FILM COATED ORAL at 12:12

## 2019-07-21 RX ADMIN — Medication 1 CAPSULE: at 09:24

## 2019-07-21 RX ADMIN — LEVETIRACETAM 500 MG: 500 TABLET, FILM COATED ORAL at 16:22

## 2019-07-21 RX ADMIN — LEVETIRACETAM 500 MG: 500 TABLET, FILM COATED ORAL at 05:15

## 2019-07-21 RX ADMIN — ETHAMBUTOL HYDROCHLORIDE 800 MG: 400 TABLET, FILM COATED ORAL at 05:15

## 2019-07-21 RX ADMIN — ATORVASTATIN CALCIUM 10 MG: 10 TABLET, FILM COATED ORAL at 16:22

## 2019-07-21 RX ADMIN — PYRIDOXINE HCL TAB 50 MG 100 MG: 50 TAB at 05:15

## 2019-07-21 RX ADMIN — RIFAMPIN 300 MG: 300 CAPSULE ORAL at 05:16

## 2019-07-21 RX ADMIN — SENNOSIDES, DOCUSATE SODIUM 2 TABLET: 50; 8.6 TABLET, FILM COATED ORAL at 05:14

## 2019-07-21 RX ADMIN — PRAMIPEXOLE DIHYDROCHLORIDE 0.25 MG: 0.5 TABLET ORAL at 16:21

## 2019-07-21 RX ADMIN — AMLODIPINE BESYLATE 10 MG: 5 TABLET ORAL at 05:15

## 2019-07-21 RX ADMIN — PYRAZINAMIDE 1000 MG: 500 TABLET ORAL at 05:15

## 2019-07-21 RX ADMIN — ACETAMINOPHEN 1000 MG: 500 TABLET ORAL at 05:16

## 2019-07-21 RX ADMIN — ACETAMINOPHEN 1000 MG: 500 TABLET ORAL at 16:22

## 2019-07-21 RX ADMIN — MAGNESIUM 64 MG (MAGNESIUM CHLORIDE) TABLET,DELAYED RELEASE 128 MG: at 16:21

## 2019-07-21 RX ADMIN — SUCRALFATE 1 G: 1 SUSPENSION ORAL at 05:14

## 2019-07-21 RX ADMIN — RIFAMPIN 300 MG: 300 CAPSULE ORAL at 16:22

## 2019-07-21 RX ADMIN — GABAPENTIN 400 MG: 400 CAPSULE ORAL at 05:14

## 2019-07-21 RX ADMIN — CINACALCET HYDROCHLORIDE 60 MG: 30 TABLET, COATED ORAL at 05:17

## 2019-07-21 RX ADMIN — OXYCODONE HYDROCHLORIDE 10 MG: 10 TABLET, FILM COATED, EXTENDED RELEASE ORAL at 16:22

## 2019-07-21 RX ADMIN — FERROUS SULFATE TAB 325 MG (65 MG ELEMENTAL FE) 325 MG: 325 (65 FE) TAB at 09:24

## 2019-07-21 RX ADMIN — PRAMIPEXOLE DIHYDROCHLORIDE 0.25 MG: 0.5 TABLET ORAL at 05:15

## 2019-07-21 RX ADMIN — METOPROLOL TARTRATE 25 MG: 25 TABLET, FILM COATED ORAL at 16:20

## 2019-07-21 ASSESSMENT — ENCOUNTER SYMPTOMS
WHEEZING: 0
SORE THROAT: 0
FOCAL WEAKNESS: 0
DIZZINESS: 0
COUGH: 0
BLURRED VISION: 0
SENSORY CHANGE: 0
PALPITATIONS: 0
SPEECH CHANGE: 0
NAUSEA: 0
HEARTBURN: 0
BACK PAIN: 0
DIARRHEA: 0
VOMITING: 0
CHILLS: 0
NECK PAIN: 0
ABDOMINAL PAIN: 0
SHORTNESS OF BREATH: 0
WEAKNESS: 0
HEADACHES: 0
NERVOUS/ANXIOUS: 0
FEVER: 0

## 2019-07-21 NOTE — CARE PLAN
Problem: Safety  Goal: Will remain free from injury  Outcome: PROGRESSING AS EXPECTED  Pt. Educated on fall protocol     Problem: Knowledge Deficit  Goal: Knowledge of disease process/condition, treatment plan, diagnostic tests, and medications will improve  Outcome: PROGRESSING AS EXPECTED  Pt. Educated on plan for the day and updated on any changes.

## 2019-07-21 NOTE — PROGRESS NOTES
[facility] Medicine Daily Progress Note    Date of Service  7/21/2019    Chief Complaint  68 y.o. female admitted 5/29/2019 with right iliac and gluteus abscess    Hospital Course  Patient is a 68 year old with history of breast cancer, copd, dm, gerd, dl and htn who was admitted with right iliac gluteus abscess.  She also has a known history of brain lesions with increasing enhancement noted on MRI and possible chronic osteomyelitis of the lumbar spine.    Interval Problem Update  Much more alert and appropriate today, oxy seemed to have been causing some of the confusion, she reports minimal hip pain 2/10- will titrate down pain meds - these were not chronic  axox3  No sob  Ros otherwise negative    Consultants/Specialty  ID  Neurosurgery    Code Status  Full    Disposition  TBD    Review of Systems  Review of Systems   Constitutional: Negative for chills, fever and malaise/fatigue.   HENT: Negative for hearing loss and sore throat.    Eyes: Negative for blurred vision.   Respiratory: Negative for cough, shortness of breath and wheezing.    Cardiovascular: Negative for chest pain, palpitations and leg swelling.   Gastrointestinal: Negative for abdominal pain, diarrhea, heartburn, nausea and vomiting.   Genitourinary: Negative for dysuria.   Musculoskeletal: Negative for back pain, joint pain and neck pain.   Skin: Negative for rash.   Neurological: Negative for dizziness, sensory change, speech change, focal weakness, weakness and headaches.   Psychiatric/Behavioral: The patient is not nervous/anxious.         Physical Exam  Temp:  [36.6 °C (97.8 °F)-37.2 °C (98.9 °F)] 36.6 °C (97.8 °F)  Pulse:  [71-92] 71  Resp:  [16-18] 16  BP: (123-151)/(60-81) 131/81  SpO2:  [94 %-100 %] 100 %    Physical Exam   Constitutional: She appears well-developed and well-nourished. No distress.   HENT:   Head: Normocephalic and atraumatic.   Mouth/Throat: Oropharynx is clear and moist.   Surgical site cdi   Eyes: Pupils are equal,  round, and reactive to light. Conjunctivae are normal.   Neck: Normal range of motion. Neck supple.   Cardiovascular: Normal rate, regular rhythm, normal heart sounds and intact distal pulses.  Exam reveals no gallop and no friction rub.    No murmur heard.  Pulmonary/Chest: Effort normal and breath sounds normal. No respiratory distress. She has no wheezes. She has no rales. She exhibits no tenderness.   Abdominal: Soft. Bowel sounds are normal. She exhibits no distension and no mass. There is no tenderness. There is no rebound and no guarding.   Musculoskeletal: Normal range of motion. She exhibits no edema or tenderness.   Neurological: She is alert. She has normal reflexes. No cranial nerve deficit. She exhibits normal muscle tone. Coordination normal.   Skin: Skin is warm and dry. No rash noted. She is not diaphoretic. No erythema. No pallor.   Nursing note and vitals reviewed.      Fluids    Intake/Output Summary (Last 24 hours) at 07/21/19 1339  Last data filed at 07/20/19 2000   Gross per 24 hour   Intake              240 ml   Output                0 ml   Net              240 ml       Laboratory                        Imaging  CTA ABDOMEN PELVIS W & W/O POST PROCESS   Final Result      1.  Interval enlargement of a hyperdense ovoid masslike structure immediately posterior to the right ilium, possibly representing a pseudoaneurysm.   2.  Stable thin-walled apparent fluid-filled structure immediately deep to the right ilium.   3.   Minimal retroperitoneal adenopathy, grossly stable.   4.  Cholelithiasis.      MR-BRAIN-WITH & W/O   Final Result      1.  Innumerable nodular enhancing lesions throughout the brain parenchyma both the supra and infratentorial compartments predominantly near the gray-white junction. There has been interval increase in size of several of these lesions since previous exam.    These changes may be secondary to metastatic disease or granulomatous disease.      2.  Interval right  parietal craniotomy with underlying elliptical postsurgical defect.      3.  Interval increase in size of index right thalamic lesion which has increased vasogenic edema since previous exam.      4.  Age-related cerebral atrophy.      IR-PICC LINE PLACEMENT W/ GUIDANCE > AGE 5   Final Result                  Ultrasound-guided PICC placement performed by qualified nursing staff as    above.          CT-NEEDLE BX-ABD-RETROPERITONL   Final Result      1.  CT GUIDED biopsy of a right pelvic phlegmon.      2. Significant interval enlargement of a right gluteal pseudoaneurysm. CTA and consideration for possible intervention either with direct thrombin injection or endovascular treatment was suggested. This was conveyed to Dr. Alatorre on 7/11/2019 via [facility]    text at 1750 hours.      CT-BBFGPPA-0 VIEW   Final Result         1.  Moderate stool in the colon suggests changes of constipation, otherwise nonspecific bowel gas pattern   2.  Atherosclerosis      US-EXTREMITY VENOUS LOWER BILAT   Final Result      IR-US GUIDED PIV   Final Result    Ultrasound-guided PERIPHERAL IV INSERTION performed by    qualified nursing staff as above.            MR-LUMBAR SPINE-WITH & W/O   Final Result         1.  Grade 2-3 spondylolisthesis at the L5-S1 level with bilateral spondylolysis of the pars interarticularis. This causes severe bilateral neural foraminal stenosis at this level.      2.  Interval removal of posterior fusion hardware at the lumbosacral junction.      3.  Diffuse osteomyelitis involving the L5 vertebral body and the first 3 sacral segments.      4.  Chronic discitis at the L5-S1 level with anterior paraspinous abscess at this level and anterior to the S1 sacral segment.      5.  Smaller intraosseous bone bone abscesses or areas of necrosis noted in the sacrum.      6.  There is also evidence of osteomyelitis involving the jose antonio and sacrum bilaterally along the course of the previous sacral fixation screw. There is a large  4 cm abscess noted in the right gluteal soft tissues in a smaller 3 cm chronic appearing    abscess in the left gluteal musculature.      7.  There is a 1.5 cm intraosseous abscess or area of necrosis in the right posterior ilium.      8.  There is a 3.3 cm centimeters multiloculated abscess anterior to the right sacroiliac joint.      MR-STEALTH BRAIN WITH & W/O   Final Result         1.  Innumerable subcentimeter brain metastases, many at the gray-white junction but also multiple noted throughout the basal ganglia and brainstem.      2.  Largest index lesion is noted in the right thalamus and has increased in size since previous exam and currently measures 9 mm and has a moderate amount of surrounding vasogenic edema.      US-EXTREMITY NON VASCULAR UNILATERAL RIGHT   Final Result      No right hip joint effusion. No soft tissue fluid collection.      MR-BRAIN-WITH & W/O   Final Result      1.  Innumerable supra and infratentorial enhancing nodules are again noted throughout the brain parenchyma with multiple lesions appearing somewhat larger and with increased enhancement. No associated diffusion restriction. Unchanged differential    considerations including bacterial or fungal infection versus metastatic disease.   2.  Mild diffuse cerebral substance loss.   3.  Mild microangiopathic ischemic change versus demyelination or gliosis.      CT-NEEDLE BX-DEEP BONE   Final Result      CT GUIDED RIGHT ILIUM DEEP BONE BIOPSY. SAMPLES WERE SENT TO MICROBIOLOGY FOR ANALYSIS.      CT-PELVIS WITH   Final Result         1.  No significant interval change in the size of the RIGHT iliac muscle fluid collections   2.  Hyperdense RIGHT gluteal lesion moderately suspicious for pseudoaneurysm with adjacent hematoma.   3.  Changes in the RIGHT iliac bone, BILATERAL sacrum and LEFT iliac bone suspicious for osteomyelitis   4.  Changes at L5-S1 suspicious for discitis osteomyelitis      CT-PELVIS WITH   Final Result      1.  Residual  or recurrent abscess within the right iliacus muscle measuring 2.5 x 1.8 cm. It slightly smaller than on 5/28/2019      2.  Interval removal of hardware from the iliac bones and sacrum      3.  Lytic change of the right iliac bone and the sacrum. This could be related to hardware versus osteomyelitis.      4.  Subacute fractures of the right ilium, sacrum, and there is lucency within the left iliac bone that is likely from hardware      MR-BRAIN-WITH & W/O   Final Result      1.  Interval progression of supra and infratentorial intra-axial nodules and associated edema. This short-term interval progression favors infection over neoplasm though both remain considerations.   2.  Mild atrophy      DX-PORTABLE FLUOROSCOPY < 1 HOUR   Final Result         Portable fluoroscopy utilized for 2 minutes.      DX-PELVIS-1 OR 2 VIEWS   Final Result      Status post hardware removal from the right SI joint      DX-CHEST-PORTABLE (1 VIEW)   Final Result      Interstitial prominence could be due to pulmonary edema or hypoventilatory change.      DX-CHEST-LIMITED (1 VIEW)   Final Result      LEFT basilar underinflation atelectasis or pneumonia      CT-DRAIN-HEMATOMA - SEROMA   Final Result      CT-guided RIGHT pelvic fluid collection aspiration, laboratory evaluation pending              Assessment/Plan  * Brain lesion- (present on admission)   Assessment & Plan    Right-sided craniotomy for resection of superficial mass  6/28/2019  Continue Isoniazid, Rifampin, Ethambutol, Pyrazinamide per ID  Continue Keppra for seizure prophylaxis     Granulomatous disease - (present on admission)   Assessment & Plan    Isoniazid, Rifampin, Ethambutol, Pyrazinamide     Sepsis (HCC)- (present on admission)   Assessment & Plan    This is sepsis (without associated acute organ dysfunction).    Source - Right Iliac and Gluteus medius abscess     Abscess of right iliac muscle and right gluteus medius muscle- (present on admission)   Assessment & Plan     CT guided pelvic aspiration 5/30/2019  Hardware removal, pelvis 6/5/2019  CT guided deep bone biopsy 6/19/2019  CT guided aspiration/biopsy of a R gluteal fluid collection/phlegmon 7/11/2019  Isoniazid, Rifampin, Ethambutol, Pyrazinamide       Hypokalemia- (present on admission)   Assessment & Plan    Will recheck     Osteomyelitis (HCC)- (present on admission)   Assessment & Plan    Query  ID following     Pseudoaneurysm following procedure (HCC)- (present on admission)   Assessment & Plan    CTA of abd/pelvis shows possible pseudoaneurysm increased in size  Message left with IR to discuss     Hypomagnesemia- (present on admission)   Assessment & Plan    Oral magnesium     Dysphagia- (present on admission)   Assessment & Plan    Dysphagia 3  Speech following     Hypercalcemia- (present on admission)   Assessment & Plan    Will recheck     Essential hypertension- (present on admission)   Assessment & Plan    Continue Metoprolol, Losartan and Amlodipine as tolerated     Chronic hyponatremia- (present on admission)   Assessment & Plan    Follow intermittently     COPD (chronic obstructive pulmonary disease) (HCC)- (present on admission)   Assessment & Plan    RT protocol  No s/o exacerbation     Non-severe protein-calorie malnutrition (HCC)- (present on admission)   Assessment & Plan    Nutrition consult       History of DVT (deep vein thrombosis)- (present on admission)   Assessment & Plan    Hold Xarelto     RLS (restless legs syndrome)- (present on admission)   Assessment & Plan    Pramipexole     Chronic pain- (present on admission)   Assessment & Plan    See above  Suspect opiates contributing to confusion  Titration as tolerated       History of breast cancer- (present on admission)   Assessment & Plan    history left mastectomy and breast implant.            VTE prophylaxis: Heparin

## 2019-07-21 NOTE — PROGRESS NOTES
Infectious Disease Progress Note    Author: Ashely Hinojosa M.D. Date & Time of service: 7/21/2019  11:28 AM    Chief Complaint:  Brain abscess, gluteal abscess  Vertebral OM     Interval History:  68 y.o. female admitted 5/29/2019 as a transfer from [facility] since 4/30/2019. + diabetes, SANTOSH of right hip with hardware, and breast cancer who was originally admitted to [facility] on 03/08/2019 for right hip and pelvic pain.  Work-up revealed a right iliopsoas lesion, gluteal abscess, and mult brain abscesses     7/4 AF much more alert and conversant today-c/o left hip pain, unrelenting and would like parietal dressing changed. HA at surgical site.  Denies SE abx. No new neurodeficits  7/5 afebrile WBC 5.4.  Patient sleeping but arousable.  She denies any headache, nausea, vomiting.  7/6 afebrile WBC 6.5.  Patient sitting up in chair and having excruciating back pain  7/8 Pt has no specific complaints, she is mildly confused today.  She seems to be tolerating her medications with no significant lab abnormalities.  7/9 AF, O2 RA, Significant left hip pain today.  She does appear to be tolerating her antibiotics.  Ultrasound of bilateral lower extremities has been ordered.  7/10 AF, O2 RA,  Plan for IR drainage of sacral collection soon.   7/11 AF, O2 RA,  Pt continued on RIPE and ampho.  She is tolerating well overall, requiring some mag and potassium replacement.  She is complaining of severe pain in left hip again today.  Plan for biopsy later today.   7/12  IR biopsy of R gluteal abscess/hip. AF, O2 2 L NC,   tolerating antibiotics so far.  She is quite somnolent today but per nursing she was oriented x4 earlier  7/14 AF, O2 RA, more alert today, conversant and asking for advance in diet. Left hip pain ongoing but improved.   7/15 afebrile WBC 4.4.  Patient's speech is somewhat muffled but she is alert and responding to questions appropriately.  MRI shows worsening lesions and amphotericin stopped.  Nurse reports  waxing and waning mental status   afebrile.  Patient sleeping but arousable.  She is answer questions appropriately but then forgets that she is in the hospital.   afebrile WBC 4.9 patient is very drowsy as she states she did not sleep well yesterday.  She is asking about whether or not she has a fungal infection.  Plan of care discussed with patient.   afebrile patient continues to only endorse left hip pain exacerbated by sitting up in the chair.  She sat in the chair for 30 minutes yesterday.  Getting out of bed is limited secondary to left hip pain.  Mental status remains about the same with intermittent waxing and waning.  No new issues today per RN   afebrile patient is much more awake and alert today.  She is answering questions appropriately.  Yesterday she was complaining of some abdominal pain so CT scan was done and revealed interval enlargement of a hyperdense ovoid masslike structure posterior to the right ilium concerning for pseudoaneurysm.    Review of Systems:  Review of Systems   Constitutional: Positive for malaise/fatigue. Negative for chills and fever.   Respiratory: Negative for shortness of breath.    Gastrointestinal: Negative for abdominal pain, nausea and vomiting.   Musculoskeletal: Positive for joint pain.        Left hip   Neurological: Negative for dizziness and headaches.       Hemodynamics:  Temp (24hrs), Av.8 °C (98.2 °F), Min:36.6 °C (97.8 °F), Max:37.2 °C (98.9 °F)  Temperature: 36.6 °C (97.8 °F)  Pulse  Av.4  Min: 49  Max: 195   Blood Pressure : 131/81       Physical Exam:  Physical Exam   Constitutional:   Elderly  Chronically ill-appearing   HENT:   Mouth/Throat: Oropharynx is clear and moist. No oropharyngeal exudate.   Right parietal surgical site. Well approximated, no drainage or surrounding erythema.     Eyes: Pupils are equal, round, and reactive to light. Conjunctivae and EOM are normal.   Neck: Neck supple.   Cardiovascular: Normal rate and  regular rhythm.    Pulmonary/Chest: Effort normal. She has no wheezes. She has no rales.   Abdominal: Soft. There is no tenderness.   Musculoskeletal: She exhibits no edema.   Right upper extremity PICC line-nontender, no surrounding erythema   Neurological: She is alert.   Skin: She is not diaphoretic.       Meds:    Current Facility-Administered Medications:   •  temazepam  •  potassium chloride SA  •  losartan  •  pramipexole  •  senna-docusate  •  polyethylene glycol/lytes  •  magnesium hydroxide  •  gabapentin  •  ferrous sulfate  •  diphenhydrAMINE  •  morphine injection  •  heparin  •  oxyCODONE CR  •  magnesium chloride  •  pyridoxine  •  levETIRAcetam  •  isoniazid  •  riFAMPin  •  ethambutol  •  pyrazinamide  •  acetaminophen  •  Pharmacy Consult Request  •  labetalol  •  hydrALAZINE  •  NS  •  lactobacillus rhamnosus  •  oxyCODONE immediate-release  •  sucralfate  •  acetaminophen  •  lidocaine  •  cyclobenzaprine  •  Respiratory Care per Protocol  •  amLODIPine  •  cinacalcet  •  atorvastatin  •  omeprazole  •  metoprolol  •  ondansetron  •  ondansetron    Labs:  No results for input(s): WBC, RBC, HEMOGLOBIN, HEMATOCRIT, MCV, MCH, RDW, PLATELETCT, MPV, NEUTSPOLYS, LYMPHOCYTES, MONOCYTES, EOSINOPHILS, BASOPHILS, RBCMORPHOLO in the last 72 hours.  No results for input(s): SODIUM, POTASSIUM, CHLORIDE, CO2, GLUCOSE, BUN, CPKTOTAL in the last 72 hours.  No results for input(s): ALBUMIN, TBILIRUBIN, ALKPHOSPHAT, TOTPROTEIN, ALTSGPT, ASTSGOT, CREATININE in the last 72 hours.    Imaging:  MRI on 7/14/2019  Impression:       1.  Innumerable nodular enhancing lesions throughout the brain parenchyma both the supra and infratentorial compartments predominantly near the gray-white junction. There has been interval increase in size of several of these lesions since previous exam.   These changes may be secondary to metastatic disease or granulomatous disease.    2.  Interval right parietal craniotomy with underlying  elliptical postsurgical defect.    3.  Interval increase in size of index right thalamic lesion which has increased vasogenic edema since previous exam.    4.  Age-related cerebral atrophy.       Micro:  Results     Procedure Component Value Units Date/Time    CULTURE TISSUE W/ GRM STAIN [[id number]] Collected:  07/11/19 1745    Order Status:  Completed Specimen:  Tissue Updated:  07/21/19 0915     Significant Indicator NEG     Source TISS     Site Right Hip Cores     Culture Result No growth at 9 days.     Gram Stain Result No organisms seen.    Narrative:       Radiology specimen Hold specimen 14 days per Dr. Junior    Anaerobic Culture [[id number]] Collected:  07/11/19 1745    Order Status:  Completed Specimen:  Tissue Updated:  07/21/19 0915     Significant Indicator NEG     Source TISS     Site Right Hip Cores     Culture Result Culture in progress.    Narrative:       Radiology specimen Hold specimen 14 days per Dr. Junior    Fungal Smear [[id number]] Collected:  06/19/19 0930    Order Status:  Completed Specimen:  Tissue from Other Body Fluid Updated:  07/18/19 1325     Significant Indicator NEG     Source TISS     Site Right Hip deep bone     Fungal Smear Results No fungal elements seen.    Narrative:       Collected By:[id number] BEATRICE HARRIS  Bone biopsy right hip  Collected By:[id number] BEATRICE HARRIS  Right hip fluid collection  Collected By:[id number] BEATRICE HARRIS    Fungal Culture [[id number]] Collected:  06/19/19 0930    Order Status:  Completed Specimen:  Tissue Updated:  07/18/19 1325     Significant Indicator NEG     Source TISS     Site Right Hip deep bone     Culture Result No fungal growth.    Narrative:       Collected By:[id number] BEATRICE HARRIS  Bone biopsy right hip  Collected By:[id number] BEATRICE HARRIS  Right hip fluid collection  Collected By:[id number] BEATRICE HARRIS    AFB Culture [[id number]] Collected:  06/19/19 0930    Order Status:  Completed Specimen:  Tissue from Other Body Fluid Updated:  07/18/19 1325  "    Significant Indicator NEG     Source TISS     Site Right Hip deep bone     Culture Result Acid fast bacilli detected by MGIT 960 instrument.  Identification to follow.       AFB Smear Results No acid fast bacilli seen.    Narrative:       Collected By:[id number] BEATRICE HARRIS  Bone biopsy right hip  Collected By:[id number] BEATRICE HARRIS  Right hip fluid collection  Collected By:[id number] BEATRICE HARRIS    CULTURE TISSUE W/ GRM STAIN [[id number]] Collected:  06/19/19 0930    Order Status:  Completed Specimen:  Tissue Updated:  07/18/19 1325     Significant Indicator NEG     Source TISS     Site Right Hip deep bone     Culture Result No growth at 72 hours.     Gram Stain Result No organisms seen.    Narrative:       Collected By:[id number] BEATRICE HARRIS  Bone biopsy right hip  Collected By:[id number] BEATRICE HARRIS  Right hip fluid collection  Collected By:[id number] BEATRICE HARRIS    Fungal Culture [[id number]] Collected:  07/11/19 1745    Order Status:  Completed Specimen:  Tissue Updated:  07/16/19 0908     Significant Indicator NEG     Source TISS     Site Right Hip Cores     Culture Result No fungal growth to date.    AFB Culture [[id number]] Collected:  07/11/19 1745    Order Status:  Completed Specimen:  Tissue Updated:  07/16/19 0908     Significant Indicator NEG     Source TISS     Site Right Hip Cores     Culture Result Culture in progress.     AFB Smear Results No acid fast bacilli seen.          Assessment:  Active Hospital Problems    Diagnosis   • *Brain lesion [G93.9]   • Granulomatous disease  [L92.9]   • Abscess of right iliac muscle and right gluteus medius muscle [L02.91]   • Pseudoaneurysm following procedure ([facility]) [I97.89, I72.9]   • Sepsis ([facility]) [A41.9]   • History of breast cancer [Z85.3]       Assessment/Plan:  Encephalopathy, intermittent waxing and waning     Brain lesions, worse on MRI  Query CNS fungal infection vs mycobacterial or other   MRI 4/23 \"supra and infratentorial brain parenchyma. Decrease in the size of " the lesions. Interval reduction in the extent of the surrounding white matter edema consistent with improvement/treatment response of the most of the multifocal brain abscesses.  MRI 5/21 showed innumerable microabscesses vs mets  Abx (vancomycin, cefepime and Flagyl) discontinued on 5/23 after ~8 weeks of treatment-developed new fevers on 6/4  MRI on 6/8 with interval progression of supra and infratentorial intra--axial nodules and associated edema.  New right thalamus lesion. Radiology favors infection over neoplasm though both remain considerations (had been off abx)  Mult blood cultures neg  CHAS neg 3/15 and 5/24  MRI 6/22 worsening CNS lesions  S/p right craniotomy and resection of mass with biopsy on 6/28. Biopsy-per note fungal appearing elements seen per Neurosurgery-discussed with pathologist who examined the frozen section and there were no fungal appearing elements.   +necrotizing granulomas. All stains negative in EPIC  Fungal culture- negative to date  Bacterial cultures-negative to date  AFB culture-in progress  Pathology- focal necrotizing granulomatous inflammation; no fungal elements or acid-fast bacilli on stains.  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 for PCR testing, bacterial, fungal, AFB   Follow-up serologic testing sent [facility] to [facility]  Brucella ab - negative  Coxiella ab - negative   Histo ab serum & antigen  "urine-negative  Blasto ab - negative      Discussed MRI findings with radiologist and there is easily obtained tissue/fluid in the sacrum and area that has been worsening despite therapy, per radiology images most consistent to TB or Brucellosis  -Pelvic biopsy on 7/11 - fluid drainage and specimen sent to [facility] again for AFB, bacterial and fungal, crypto and cocci PCR-confirmed with Alex this was sent out on 7/12   Pathology  - No definite acid fast or fungal organisms are seen.     Gluteal and iliopsoas abscess   OM L5, S1-3, new this admission  Recurrent abscesses  Adjacent to hardware in right hip (placed 12/17/18)  CT 4/23 \"Fluid collections within the right gluteal and iliacis muscles.. The collection within the right gluteal muscle has unchanged while the fluid collection within the right iliacis muscle is increased somewhat in size.\" 2.7 cm  Cultures 3/29 and 4/4 neg  MRI on 5/20/2019 - interval decrease in collection in R gluteal muscle and persistent multiloculated collection in R iliacus muscle.   CT 5/28 no change  s/p drainage on 5/30/2019-cultures negative  S/p removal hardware 6/5-no cultures done  1, 3 beta glucan neg  CT on 6/8 with residual abscess in the right iliacus muscle, 2.5 x 1.8 cm, slightly smaller than prior  s/p CT-guided deep bone biopsy 6/19/2019.  Cultures remain negative; path not reported   MRI 6/28 chronic discitis, diffuse OM L5, 3 and 4 cm abscesses right glute, 1.5 cm abscess or necrosis right posterior ileum, 3.3 cm abscess right SI joint  Continue TB therapy     Right gluteal pseudoaneurysm  significant interval enlargement noted by IR during procedure on 7/11  -CTA planned      +QuantiGold  Query disseminated TB  Path with necrotizing granulomas  AFB stain neg  AFB cultures pending  All prior AFB cxs still neg  Started RIPE +B6 7/3  Monitor for visual changes while on ethambutol.  None currently.   Ophthalmology evaluation for baseline vision as well as color vision testing " when able     Type 2 DM  Hemoglobin A1c 6.3   Keep BS under 150 to help control current infection    Do not recommend transfer to outside facility until outside testing is complete and there is a plan of care.     Discussed with IM RN

## 2019-07-21 NOTE — CARE PLAN
Problem: Safety  Goal: Will remain free from injury  Outcome: PROGRESSING AS EXPECTED  Call light within reach. Bed locked and in lowest position. Bed alarm on. Room near nurses station. Rounding maintained.     Problem: Urinary Elimination:  Goal: Ability to reestablish a normal urinary elimination pattern will improve  Outcome: PROGRESSING AS EXPECTED  Occasionally incontinent. Adequate output.

## 2019-07-22 ENCOUNTER — APPOINTMENT (OUTPATIENT)
Dept: RADIOLOGY | Facility: MEDICAL CENTER | Age: 69
DRG: 356 | End: 2019-07-22
Attending: RADIOLOGY
Payer: MEDICARE

## 2019-07-22 ENCOUNTER — APPOINTMENT (OUTPATIENT)
Dept: RADIOLOGY | Facility: MEDICAL CENTER | Age: 69
DRG: 356 | End: 2019-07-22
Attending: HOSPITALIST
Payer: MEDICARE

## 2019-07-22 PROBLEM — E83.52 HYPERCALCEMIA: Status: RESOLVED | Noted: 2019-04-21 | Resolved: 2019-07-22

## 2019-07-22 LAB
ANION GAP SERPL CALC-SCNC: 9 MMOL/L (ref 0–11.9)
BASOPHILS # BLD AUTO: 0.9 % (ref 0–1.8)
BASOPHILS # BLD: 0.05 K/UL (ref 0–0.12)
BUN SERPL-MCNC: 13 MG/DL (ref 8–22)
CALCIUM SERPL-MCNC: 10 MG/DL (ref 8.5–10.5)
CHLORIDE SERPL-SCNC: 103 MMOL/L (ref 96–112)
CO2 SERPL-SCNC: 24 MMOL/L (ref 20–33)
CREAT SERPL-MCNC: 0.62 MG/DL (ref 0.5–1.4)
EOSINOPHIL # BLD AUTO: 0.3 K/UL (ref 0–0.51)
EOSINOPHIL NFR BLD: 5.2 % (ref 0–6.9)
ERYTHROCYTE [DISTWIDTH] IN BLOOD BY AUTOMATED COUNT: 55.6 FL (ref 35.9–50)
GLUCOSE SERPL-MCNC: 106 MG/DL (ref 65–99)
HCT VFR BLD AUTO: 36.3 % (ref 37–47)
HGB BLD-MCNC: 11.3 G/DL (ref 12–16)
IMM GRANULOCYTES # BLD AUTO: 0.02 K/UL (ref 0–0.11)
IMM GRANULOCYTES NFR BLD AUTO: 0.3 % (ref 0–0.9)
LYMPHOCYTES # BLD AUTO: 1.19 K/UL (ref 1–4.8)
LYMPHOCYTES NFR BLD: 20.7 % (ref 22–41)
MAGNESIUM SERPL-MCNC: 1.7 MG/DL (ref 1.5–2.5)
MCH RBC QN AUTO: 29.4 PG (ref 27–33)
MCHC RBC AUTO-ENTMCNC: 31.1 G/DL (ref 33.6–35)
MCV RBC AUTO: 94.5 FL (ref 81.4–97.8)
MONOCYTES # BLD AUTO: 0.69 K/UL (ref 0–0.85)
MONOCYTES NFR BLD AUTO: 12 % (ref 0–13.4)
NEUTROPHILS # BLD AUTO: 3.5 K/UL (ref 2–7.15)
NEUTROPHILS NFR BLD: 60.9 % (ref 44–72)
NRBC # BLD AUTO: 0 K/UL
NRBC BLD-RTO: 0 /100 WBC
PHOSPHATE SERPL-MCNC: 3.8 MG/DL (ref 2.5–4.5)
PLATELET # BLD AUTO: 258 K/UL (ref 164–446)
PMV BLD AUTO: 9.2 FL (ref 9–12.9)
POTASSIUM SERPL-SCNC: 3.7 MMOL/L (ref 3.6–5.5)
RBC # BLD AUTO: 3.84 M/UL (ref 4.2–5.4)
SODIUM SERPL-SCNC: 136 MMOL/L (ref 135–145)
WBC # BLD AUTO: 5.8 K/UL (ref 4.8–10.8)

## 2019-07-22 PROCEDURE — A9270 NON-COVERED ITEM OR SERVICE: HCPCS | Performed by: FAMILY MEDICINE

## 2019-07-22 PROCEDURE — 700111 HCHG RX REV CODE 636 W/ 250 OVERRIDE (IP): Performed by: HOSPITALIST

## 2019-07-22 PROCEDURE — 99232 SBSQ HOSP IP/OBS MODERATE 35: CPT | Performed by: HOSPITALIST

## 2019-07-22 PROCEDURE — 700102 HCHG RX REV CODE 250 W/ 637 OVERRIDE(OP): Performed by: FAMILY MEDICINE

## 2019-07-22 PROCEDURE — 3E023GC INTRODUCTION OF OTHER THERAPEUTIC SUBSTANCE INTO MUSCLE, PERCUTANEOUS APPROACH: ICD-10-PCS | Performed by: RADIOLOGY

## 2019-07-22 PROCEDURE — 700101 HCHG RX REV CODE 250

## 2019-07-22 PROCEDURE — 770001 HCHG ROOM/CARE - MED/SURG/GYN PRIV*

## 2019-07-22 PROCEDURE — 83735 ASSAY OF MAGNESIUM: CPT

## 2019-07-22 PROCEDURE — 97116 GAIT TRAINING THERAPY: CPT

## 2019-07-22 PROCEDURE — 700101 HCHG RX REV CODE 250: Performed by: FAMILY MEDICINE

## 2019-07-22 PROCEDURE — 700102 HCHG RX REV CODE 250 W/ 637 OVERRIDE(OP): Performed by: HOSPITALIST

## 2019-07-22 PROCEDURE — A9270 NON-COVERED ITEM OR SERVICE: HCPCS | Performed by: HOSPITALIST

## 2019-07-22 PROCEDURE — 76942 ECHO GUIDE FOR BIOPSY: CPT

## 2019-07-22 PROCEDURE — 99232 SBSQ HOSP IP/OBS MODERATE 35: CPT | Performed by: INTERNAL MEDICINE

## 2019-07-22 PROCEDURE — 84100 ASSAY OF PHOSPHORUS: CPT

## 2019-07-22 PROCEDURE — A9270 NON-COVERED ITEM OR SERVICE: HCPCS | Performed by: INTERNAL MEDICINE

## 2019-07-22 PROCEDURE — 80048 BASIC METABOLIC PNL TOTAL CA: CPT

## 2019-07-22 PROCEDURE — 93926 LOWER EXTREMITY STUDY: CPT | Mod: RT

## 2019-07-22 PROCEDURE — 85025 COMPLETE CBC W/AUTO DIFF WBC: CPT

## 2019-07-22 PROCEDURE — 97530 THERAPEUTIC ACTIVITIES: CPT

## 2019-07-22 PROCEDURE — 700102 HCHG RX REV CODE 250 W/ 637 OVERRIDE(OP): Performed by: INTERNAL MEDICINE

## 2019-07-22 RX ORDER — MAGNESIUM SULFATE HEPTAHYDRATE 40 MG/ML
2 INJECTION, SOLUTION INTRAVENOUS ONCE
Status: COMPLETED | OUTPATIENT
Start: 2019-07-22 | End: 2019-07-22

## 2019-07-22 RX ORDER — RIFAMPIN 300 MG/1
600 CAPSULE ORAL DAILY
Status: DISCONTINUED | OUTPATIENT
Start: 2019-07-23 | End: 2019-08-10 | Stop reason: HOSPADM

## 2019-07-22 RX ORDER — ACETAMINOPHEN 500 MG
500 TABLET ORAL 2 TIMES DAILY PRN
Status: DISCONTINUED | OUTPATIENT
Start: 2019-07-22 | End: 2019-08-02

## 2019-07-22 RX ORDER — TEMAZEPAM 15 MG/1
15 CAPSULE ORAL ONCE
Status: COMPLETED | OUTPATIENT
Start: 2019-07-22 | End: 2019-07-22

## 2019-07-22 RX ADMIN — OXYCODONE HYDROCHLORIDE 10 MG: 10 TABLET, FILM COATED, EXTENDED RELEASE ORAL at 05:51

## 2019-07-22 RX ADMIN — SUCRALFATE 1 G: 1 SUSPENSION ORAL at 11:50

## 2019-07-22 RX ADMIN — GABAPENTIN 400 MG: 400 CAPSULE ORAL at 17:45

## 2019-07-22 RX ADMIN — LIDOCAINE 2 PATCH: 50 PATCH CUTANEOUS at 11:48

## 2019-07-22 RX ADMIN — SUCRALFATE 1 G: 1 SUSPENSION ORAL at 17:44

## 2019-07-22 RX ADMIN — HEPARIN SODIUM 5000 UNITS: 5000 INJECTION, SOLUTION INTRAVENOUS; SUBCUTANEOUS at 17:44

## 2019-07-22 RX ADMIN — PYRIDOXINE HCL TAB 50 MG 100 MG: 50 TAB at 05:26

## 2019-07-22 RX ADMIN — METOPROLOL TARTRATE 25 MG: 25 TABLET, FILM COATED ORAL at 17:44

## 2019-07-22 RX ADMIN — PYRAZINAMIDE 1000 MG: 500 TABLET ORAL at 05:26

## 2019-07-22 RX ADMIN — GABAPENTIN 400 MG: 400 CAPSULE ORAL at 11:48

## 2019-07-22 RX ADMIN — LIDOCAINE HYDROCHLORIDE 15 ML: 20 SOLUTION OROPHARYNGEAL at 16:15

## 2019-07-22 RX ADMIN — THROMBIN, TOPICAL (BOVINE) 1900 UNITS: KIT at 17:04

## 2019-07-22 RX ADMIN — AMLODIPINE BESYLATE 10 MG: 5 TABLET ORAL at 05:27

## 2019-07-22 RX ADMIN — MAGNESIUM 64 MG (MAGNESIUM CHLORIDE) TABLET,DELAYED RELEASE 128 MG: at 05:26

## 2019-07-22 RX ADMIN — PRAMIPEXOLE DIHYDROCHLORIDE 0.25 MG: 0.5 TABLET ORAL at 05:27

## 2019-07-22 RX ADMIN — ETHAMBUTOL HYDROCHLORIDE 800 MG: 400 TABLET, FILM COATED ORAL at 05:26

## 2019-07-22 RX ADMIN — SUCRALFATE 1 G: 1 SUSPENSION ORAL at 23:20

## 2019-07-22 RX ADMIN — SUCRALFATE 1 G: 1 SUSPENSION ORAL at 00:19

## 2019-07-22 RX ADMIN — SENNOSIDES, DOCUSATE SODIUM 2 TABLET: 50; 8.6 TABLET, FILM COATED ORAL at 17:45

## 2019-07-22 RX ADMIN — RIFAMPIN 300 MG: 300 CAPSULE ORAL at 05:26

## 2019-07-22 RX ADMIN — MAGNESIUM SULFATE 2 G: 2 INJECTION INTRAVENOUS at 13:20

## 2019-07-22 RX ADMIN — GABAPENTIN 400 MG: 400 CAPSULE ORAL at 05:27

## 2019-07-22 RX ADMIN — TEMAZEPAM 15 MG: 15 CAPSULE ORAL at 23:21

## 2019-07-22 RX ADMIN — PRAMIPEXOLE DIHYDROCHLORIDE 0.25 MG: 0.5 TABLET ORAL at 11:48

## 2019-07-22 RX ADMIN — POTASSIUM CHLORIDE 20 MEQ: 20 TABLET, EXTENDED RELEASE ORAL at 05:27

## 2019-07-22 RX ADMIN — ACETAMINOPHEN 1000 MG: 500 TABLET ORAL at 05:27

## 2019-07-22 RX ADMIN — ISONIAZID 300 MG: 300 TABLET ORAL at 05:27

## 2019-07-22 RX ADMIN — LEVETIRACETAM 500 MG: 500 TABLET, FILM COATED ORAL at 17:44

## 2019-07-22 RX ADMIN — FERROUS SULFATE TAB 325 MG (65 MG ELEMENTAL FE) 325 MG: 325 (65 FE) TAB at 08:12

## 2019-07-22 RX ADMIN — METOPROLOL TARTRATE 25 MG: 25 TABLET, FILM COATED ORAL at 05:27

## 2019-07-22 RX ADMIN — CINACALCET HYDROCHLORIDE 60 MG: 30 TABLET, COATED ORAL at 05:26

## 2019-07-22 RX ADMIN — PRAMIPEXOLE DIHYDROCHLORIDE 0.25 MG: 0.5 TABLET ORAL at 17:45

## 2019-07-22 RX ADMIN — HEPARIN SODIUM 5000 UNITS: 5000 INJECTION, SOLUTION INTRAVENOUS; SUBCUTANEOUS at 05:27

## 2019-07-22 RX ADMIN — CYCLOBENZAPRINE 10 MG: 10 TABLET, FILM COATED ORAL at 05:35

## 2019-07-22 RX ADMIN — OXYCODONE HYDROCHLORIDE 10 MG: 10 TABLET, FILM COATED, EXTENDED RELEASE ORAL at 17:44

## 2019-07-22 RX ADMIN — Medication 1 CAPSULE: at 08:12

## 2019-07-22 RX ADMIN — OMEPRAZOLE 20 MG: 20 CAPSULE, DELAYED RELEASE ORAL at 05:26

## 2019-07-22 RX ADMIN — LEVETIRACETAM 500 MG: 500 TABLET, FILM COATED ORAL at 05:27

## 2019-07-22 RX ADMIN — ATORVASTATIN CALCIUM 10 MG: 10 TABLET, FILM COATED ORAL at 17:44

## 2019-07-22 RX ADMIN — SUCRALFATE 1 G: 1 SUSPENSION ORAL at 05:26

## 2019-07-22 RX ADMIN — SENNOSIDES, DOCUSATE SODIUM 2 TABLET: 50; 8.6 TABLET, FILM COATED ORAL at 05:26

## 2019-07-22 RX ADMIN — LOSARTAN POTASSIUM 75 MG: 50 TABLET ORAL at 05:26

## 2019-07-22 ASSESSMENT — COGNITIVE AND FUNCTIONAL STATUS - GENERAL
TURNING FROM BACK TO SIDE WHILE IN FLAT BAD: A LOT
CLIMB 3 TO 5 STEPS WITH RAILING: A LOT
WALKING IN HOSPITAL ROOM: A LITTLE
SUGGESTED CMS G CODE MODIFIER MOBILITY: CL
MOBILITY SCORE: 14
MOVING FROM LYING ON BACK TO SITTING ON SIDE OF FLAT BED: A LOT
MOVING TO AND FROM BED TO CHAIR: A LOT
STANDING UP FROM CHAIR USING ARMS: A LITTLE

## 2019-07-22 ASSESSMENT — GAIT ASSESSMENTS
DISTANCE (FEET): 20
DEVIATION: ANTALGIC;SHUFFLED GAIT
GAIT LEVEL OF ASSIST: MINIMAL ASSIST
ASSISTIVE DEVICE: FRONT WHEEL WALKER

## 2019-07-22 ASSESSMENT — ENCOUNTER SYMPTOMS
HEARTBURN: 0
VOMITING: 0
DIARRHEA: 0
DIZZINESS: 0
WEAKNESS: 0
HEADACHES: 0
SHORTNESS OF BREATH: 0
FOCAL WEAKNESS: 0
NAUSEA: 0
ABDOMINAL PAIN: 0
COUGH: 0
SORE THROAT: 0
FEVER: 0
PALPITATIONS: 0
BLURRED VISION: 0
WHEEZING: 0
NECK PAIN: 0
SPEECH CHANGE: 0
SENSORY CHANGE: 0
BACK PAIN: 0
NERVOUS/ANXIOUS: 0
CHILLS: 0

## 2019-07-22 NOTE — PROGRESS NOTES
Infectious Disease Progress Note    Author: Beatriz Johnson M.D. Date & Time of service: 7/22/2019  3:20 PM    Chief Complaint:  Brain abscess, gluteal abscess  Vertebral OM     Interval History:  68 y.o. female admitted 5/29/2019 as a transfer from [facility] since 4/30/2019. + diabetes, SANTOSH of right hip with hardware, and breast cancer who was originally admitted to [facility] on 03/08/2019 for right hip and pelvic pain.  Work-up revealed a right iliopsoas lesion, gluteal abscess, and mult brain abscesses     7/4 AF much more alert and conversant today-c/o left hip pain, unrelenting and would like parietal dressing changed. HA at surgical site.  Denies SE abx. No new neurodeficits  7/5 afebrile WBC 5.4.  Patient sleeping but arousable.  She denies any headache, nausea, vomiting.  7/6 afebrile WBC 6.5.  Patient sitting up in chair and having excruciating back pain  7/8 Pt has no specific complaints, she is mildly confused today.  She seems to be tolerating her medications with no significant lab abnormalities.  7/9 AF, O2 RA, Significant left hip pain today.  She does appear to be tolerating her antibiotics.  Ultrasound of bilateral lower extremities has been ordered.  7/10 AF, O2 RA,  Plan for IR drainage of sacral collection soon.   7/11 AF, O2 RA,  Pt continued on RIPE and ampho.  She is tolerating well overall, requiring some mag and potassium replacement.  She is complaining of severe pain in left hip again today.  Plan for biopsy later today.   7/12  IR biopsy of R gluteal abscess/hip. AF, O2 2 L NC,   tolerating antibiotics so far.  She is quite somnolent today but per nursing she was oriented x4 earlier  7/14 AF, O2 RA, more alert today, conversant and asking for advance in diet. Left hip pain ongoing but improved.   7/15 afebrile WBC 4.4.  Patient's speech is somewhat muffled but she is alert and responding to questions appropriately.  MRI shows worsening lesions and amphotericin stopped.  Nurse reports  waxing and waning mental status   afebrile.  Patient sleeping but arousable.  She is answer questions appropriately but then forgets that she is in the hospital.   afebrile WBC 4.9 patient is very drowsy as she states she did not sleep well yesterday.  She is asking about whether or not she has a fungal infection.  Plan of care discussed with patient.   afebrile patient continues to only endorse left hip pain exacerbated by sitting up in the chair.  She sat in the chair for 30 minutes yesterday.  Getting out of bed is limited secondary to left hip pain.  Mental status remains about the same with intermittent waxing and waning.  No new issues today per RN   afebrile patient is much more awake and alert today.  She is answering questions appropriately.  Yesterday she was complaining of some abdominal pain so CT scan was done and revealed interval enlargement of a hyperdense ovoid masslike structure posterior to the right ilium concerning for pseudoaneurysm.  2019 no fevers.  No new issues overnight.  Had her ultrasound today follow the results  Review of Systems:  Review of Systems   Constitutional: Positive for malaise/fatigue. Negative for chills and fever.   Respiratory: Negative for shortness of breath.    Gastrointestinal: Negative for abdominal pain, nausea and vomiting.   Musculoskeletal: Positive for joint pain.        Left hip   Neurological: Negative for dizziness and headaches.       Hemodynamics:  Temp (24hrs), Av.8 °C (98.2 °F), Min:36.6 °C (97.9 °F), Max:37.2 °C (99 °F)  Temperature: 36.6 °C (97.9 °F)  Pulse  Av.3  Min: 49  Max: 195   Blood Pressure : 152/78       Physical Exam:  Physical Exam   Constitutional:   Elderly  Chronically ill-appearing   HENT:   Mouth/Throat: Oropharynx is clear and moist. No oropharyngeal exudate.   Right parietal surgical site. Well approximated, no drainage or surrounding erythema.     Eyes: Pupils are equal, round, and reactive to light.  Conjunctivae and EOM are normal.   Neck: Neck supple.   Cardiovascular: Normal rate and regular rhythm.    Pulmonary/Chest: Effort normal. She has no wheezes. She has no rales.   Abdominal: Soft. There is no tenderness.   Musculoskeletal: She exhibits no edema.   Right upper extremity PICC line-nontender, no surrounding erythema   Neurological: She is alert.   Skin: She is not diaphoretic.       Meds:    Current Facility-Administered Medications:   •  acetaminophen  •  hyoscyamine-maalox plus-lidocaine viscous  •  [START ON 7/23/2019] riFAMPin  •  oxyCODONE immediate-release  •  oxyCODONE CR  •  temazepam  •  potassium chloride SA  •  losartan  •  pramipexole  •  senna-docusate  •  polyethylene glycol/lytes  •  magnesium hydroxide  •  gabapentin  •  ferrous sulfate  •  diphenhydrAMINE  •  heparin  •  pyridoxine  •  levETIRAcetam  •  isoniazid  •  ethambutol  •  pyrazinamide  •  Pharmacy Consult Request  •  labetalol  •  hydrALAZINE  •  lactobacillus rhamnosus  •  sucralfate  •  lidocaine  •  cyclobenzaprine  •  Respiratory Care per Protocol  •  amLODIPine  •  cinacalcet  •  atorvastatin  •  omeprazole  •  metoprolol  •  ondansetron  •  ondansetron    Labs:  Recent Labs      07/22/19   0330   WBC  5.8   RBC  3.84*   HEMOGLOBIN  11.3*   HEMATOCRIT  36.3*   MCV  94.5   MCH  29.4   RDW  55.6*   PLATELETCT  258   MPV  9.2   NEUTSPOLYS  60.90   LYMPHOCYTES  20.70*   MONOCYTES  12.00   EOSINOPHILS  5.20   BASOPHILS  0.90     Recent Labs      07/22/19   0330   SODIUM  136   POTASSIUM  3.7   CHLORIDE  103   CO2  24   GLUCOSE  106*   BUN  13     Recent Labs      07/22/19   0330   CREATININE  0.62       Imaging:  MRI on 7/14/2019  Impression:       1.  Innumerable nodular enhancing lesions throughout the brain parenchyma both the supra and infratentorial compartments predominantly near the gray-white junction. There has been interval increase in size of several of these lesions since previous exam.   These changes may be  secondary to metastatic disease or granulomatous disease.    2.  Interval right parietal craniotomy with underlying elliptical postsurgical defect.    3.  Interval increase in size of index right thalamic lesion which has increased vasogenic edema since previous exam.    4.  Age-related cerebral atrophy.       Micro:  Results     Procedure Component Value Units Date/Time    Anaerobic Culture [[id number]] Collected:  07/11/19 1745    Order Status:  Completed Specimen:  Tissue Updated:  07/22/19 1004     Significant Indicator NEG     Source TISS     Site Right Hip Cores     Culture Result Culture in progress.    Narrative:       Radiology specimen Hold specimen 14 days per Dr. Junior    CULTURE TISSUE W/ GRM STAIN [[id number]] Collected:  07/11/19 1745    Order Status:  Completed Specimen:  Tissue Updated:  07/22/19 1004     Significant Indicator NEG     Source TISS     Site Right Hip Cores     Culture Result No growth at 10 days.     Gram Stain Result No organisms seen.    Narrative:       Radiology specimen Hold specimen 14 days per Dr. Junior    Fungal Smear [[id number]] Collected:  06/19/19 0930    Order Status:  Completed Specimen:  Tissue from Other Body Fluid Updated:  07/18/19 1325     Significant Indicator NEG     Source TISS     Site Right Hip deep bone     Fungal Smear Results No fungal elements seen.    Narrative:       Collected By:[id number] BEATRICE HARRIS  Bone biopsy right hip  Collected By:[id number] BEATRICE HARRIS  Right hip fluid collection  Collected By:[id number] BEATRICE HARRIS    Fungal Culture [[id number]] Collected:  06/19/19 0930    Order Status:  Completed Specimen:  Tissue Updated:  07/18/19 1325     Significant Indicator NEG     Source TISS     Site Right Hip deep bone     Culture Result No fungal growth.    Narrative:       Collected By:[id number] BEATRICE HARRIS  Bone biopsy right hip  Collected By:[id number] BEATRICE HARRIS  Right hip fluid collection  Collected By:[id number] BEATRICE HARRIS    AFB Culture [[id number]]  Collected:  06/19/19 0930    Order Status:  Completed Specimen:  Tissue from Other Body Fluid Updated:  07/18/19 1325     Significant Indicator NEG     Source TISS     Site Right Hip deep bone     Culture Result Acid fast bacilli detected by MGIT 960 instrument.  Identification to follow.       AFB Smear Results No acid fast bacilli seen.    Narrative:       Collected By:[id number] BEATRICE HARRIS  Bone biopsy right hip  Collected By:[id number] BEATRICE HARRIS  Right hip fluid collection  Collected By:[id number] BEATRICE HARRIS    CULTURE TISSUE W/ GRM STAIN [[id number]] Collected:  06/19/19 0930    Order Status:  Completed Specimen:  Tissue Updated:  07/18/19 1325     Significant Indicator NEG     Source TISS     Site Right Hip deep bone     Culture Result No growth at 72 hours.     Gram Stain Result No organisms seen.    Narrative:       Collected By:[id number] BEATRICE HARRIS  Bone biopsy right hip  Collected By:[id number] BEATRICE HARRIS  Right hip fluid collection  Collected By:[id number] BEATRICE HARRIS    Fungal Culture [[id number]] Collected:  07/11/19 1745    Order Status:  Completed Specimen:  Tissue Updated:  07/16/19 0908     Significant Indicator NEG     Source TISS     Site Right Hip Cores     Culture Result No fungal growth to date.    AFB Culture [[id number]] Collected:  07/11/19 1745    Order Status:  Completed Specimen:  Tissue Updated:  07/16/19 0908     Significant Indicator NEG     Source TISS     Site Right Hip Cores     Culture Result Culture in progress.     AFB Smear Results No acid fast bacilli seen.          Assessment:  Active Hospital Problems    Diagnosis   • *Brain lesion [G93.9]   • Granulomatous disease  [L92.9]   • Abscess of right iliac muscle and right gluteus medius muscle [L02.91]   • Pseudoaneurysm following procedure (HCC) [I97.89, I72.9]   • Sepsis ([facility]) [A41.9]   • History of breast cancer [Z85.3]       Assessment/Plan:  Encephalopathy, intermittent waxing and waning     Brain lesions, worse on MRI  Query CNS  "fungal infection vs mycobacterial or other   MRI 4/23 \"supra and infratentorial brain parenchyma. Decrease in the size of the lesions. Interval reduction in the extent of the surrounding white matter edema consistent with improvement/treatment response of the most of the multifocal brain abscesses.  MRI 5/21 showed innumerable microabscesses vs mets  Abx (vancomycin, cefepime and Flagyl) discontinued on 5/23 after ~8 weeks of treatment-developed new fevers on 6/4  MRI on 6/8 with interval progression of supra and infratentorial intra--axial nodules and associated edema.  New right thalamus lesion. Radiology favors infection over neoplasm though both remain considerations (had been off abx)  Mult blood cultures neg  CHAS neg 3/15 and 5/24  MRI 6/22 worsening CNS lesions  S/p right craniotomy and resection of mass with biopsy on 6/28. Biopsy-per note fungal appearing elements seen per Neurosurgery-discussed with pathologist who examined the frozen section and there were no fungal appearing elements.   +necrotizing granulomas. All stains negative in EPIC  Fungal culture- negative to date  Bacterial cultures-negative to date  AFB culture-in progress  Pathology- focal necrotizing granulomatous inflammation; no fungal elements or acid-fast bacilli on stains.  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 but we have no confirmed tissue diagnosis  Continue pyridoxine (B6) while on isoniazid  Will need occasional testing AST/ALT, both so far within normal limits  Follow-up brain biopsy specimen sent to [facility] for PCR testing, bacterial, fungal, AFB " "  Follow-up serologic testing sent out to [facility]  Brucella ab - negative  Coxiella ab - negative   Histo ab serum & antigen urine-negative  Blasto ab - negative      Discussed MRI findings with radiologist and there is easily obtained tissue/fluid in the sacrum and area that has been worsening despite therapy, per radiology images most consistent to TB or Brucellosis  -Pelvic biopsy on 7/11 - fluid drainage and specimen sent to [facility] again for AFB, bacterial and fungal, crypto and cocci PCR-confirmed with Alex this was sent out on 7/12   Pathology  - No definite acid fast or fungal organisms are seen.     Gluteal and iliopsoas abscess   OM L5, S1-3, new this admission  Recurrent abscesses  Adjacent to hardware in right hip (placed 12/17/18)  CT 4/23 \"Fluid collections within the right gluteal and iliacis muscles.. The collection within the right gluteal muscle has unchanged while the fluid collection within the right iliacis muscle is increased somewhat in size.\" 2.7 cm  Cultures 3/29 and 4/4 neg  MRI on 5/20/2019 - interval decrease in collection in R gluteal muscle and persistent multiloculated collection in R iliacus muscle.   CT 5/28 no change  s/p drainage on 5/30/2019-cultures negative  S/p removal hardware 6/5-no cultures done  1, 3 beta glucan neg  CT on 6/8 with residual abscess in the right iliacus muscle, 2.5 x 1.8 cm, slightly smaller than prior  s/p CT-guided deep bone biopsy 6/19/2019.  Cultures remain negative; path not reported   MRI 6/28 chronic discitis, diffuse OM L5, 3 and 4 cm abscesses right glute, 1.5 cm abscess or necrosis right posterior ileum, 3.3 cm abscess right SI joint  Continue TB therapy     Right gluteal pseudoaneurysm  significant interval enlargement noted by IR during procedure on 7/11  -CTA planned      +QuantiGold  Query disseminated TB  Path with necrotizing granulomas  AFB stain neg  AFB cultures pending  All prior AFB cxs still neg  Started RIPE +B6 7/3  Monitor for visual " changes while on ethambutol.  None currently.   Ophthalmology evaluation for baseline vision as well as color vision testing when able     Type 2 DM  Hemoglobin A1c 6.3   Keep BS under 150 to help control current infection    Do not recommend transfer to outside facility until outside testing is complete and there is a plan of care.     Discussed with IM RN

## 2019-07-22 NOTE — DISCHARGE PLANNING
Anticipated Discharge Disposition: SNF    Action: LSW spoke with [facility] and requested referral be sent to [facility] to see if they accept pt's insurance.     Barriers to Discharge: None    Plan: Awaiting acceptance to SNF

## 2019-07-22 NOTE — THERAPY
"Physical Therapy Treatment completed.   Bed Mobility:  Supine to Sit: Minimal Assist  Transfers: Sit to Stand: Minimal Assist  Gait: Level Of Assist: Minimal Assist (mostly for FWW propulsion) x20 ft  Plan of Care: Will benefit from Physical Therapy 3 times per week  Discharge Recommendations: Equipment: Will Continue to Assess for Equipment Needs. Post-acute therapy Discharge to a transitional care facility for continued skilled therapy services.     Patient continues to be limited by L hip and groin pain, that became worse during ambulation. Patient able to ambulate with CGA and MIN A for FWW propulsion x20 ft. Further distance limited by increasingly high L hip/groin pain. Patient required MOD A for sit>supine due to pain. Cues for breathing technique. Patient declined sitting up in chair due to chair being uncomfortable. Will continue to follow per POC. Recommend d/c to post acute services.     See \"Rehab Therapy-Acute\" Patient Summary Report for complete documentation.       "

## 2019-07-22 NOTE — PROGRESS NOTES
Arsh Hsu (patient's brother) called re: he has never heard from anyone regarding his sister's biopsy results, and he also has some questions regarding the most recent procedures his sister has had. Gave message to Dr. Sorensen.     Mr. Hsu also wanted to speak with / for his sister. Advised him that she is out ill today but I will attempt to get a message if there is anyone in the office covering for her.

## 2019-07-22 NOTE — PROGRESS NOTES
[facility] Medicine Daily Progress Note    Date of Service  7/22/2019    Chief Complaint  68 y.o. female admitted 5/29/2019 with right iliac and gluteus abscess    Hospital Course  Patient is a 68 year old with history of breast cancer, copd, dm, gerd, dl and htn who was admitted with right iliac gluteus abscess.  She also has a known history of brain lesions with increasing enhancement noted on MRI and possible chronic osteomyelitis of the lumbar spine.    Interval Problem Update  MIld confusion this am, did get flexeril prior to my exam  She is axox3  Reports ongoing r hip pain 4/10  No h/a  No dizziness  Ros otherwise negative  Will check baseline visual acuity    Consultants/Specialty  ID  Neurosurgery    Code Status  Full    Disposition  TBD    Review of Systems  Review of Systems   Constitutional: Negative for chills, fever and malaise/fatigue.   HENT: Negative for hearing loss and sore throat.    Eyes: Negative for blurred vision.   Respiratory: Negative for cough, shortness of breath and wheezing.    Cardiovascular: Negative for chest pain, palpitations and leg swelling.   Gastrointestinal: Negative for abdominal pain, diarrhea, heartburn, nausea and vomiting.   Genitourinary: Negative for dysuria.   Musculoskeletal: Negative for back pain, joint pain and neck pain.   Skin: Negative for rash.   Neurological: Negative for dizziness, sensory change, speech change, focal weakness, weakness and headaches.   Psychiatric/Behavioral: The patient is not nervous/anxious.         Physical Exam  Temp:  [36.6 °C (97.9 °F)-37.2 °C (99 °F)] 36.6 °C (97.9 °F)  Pulse:  [76-83] 77  Resp:  [16-18] 16  BP: (133-152)/(68-78) 152/78  SpO2:  [94 %-100 %] 94 %    Physical Exam   Constitutional: She appears well-developed and well-nourished. No distress.   HENT:   Head: Normocephalic and atraumatic.   Mouth/Throat: Oropharynx is clear and moist.   Surgical site cdi   Eyes: Pupils are equal, round, and reactive to light. Conjunctivae  are normal.   Neck: Normal range of motion. Neck supple.   Cardiovascular: Normal rate, regular rhythm, normal heart sounds and intact distal pulses.  Exam reveals no gallop and no friction rub.    No murmur heard.  Pulmonary/Chest: Effort normal and breath sounds normal. No respiratory distress. She has no wheezes. She has no rales. She exhibits no tenderness.   Abdominal: Soft. Bowel sounds are normal. She exhibits no distension and no mass. There is no tenderness. There is no rebound and no guarding.   Musculoskeletal: Normal range of motion. She exhibits no edema or tenderness.   Neurological: She is alert. She has normal reflexes. No cranial nerve deficit. She exhibits normal muscle tone. Coordination normal.   Skin: Skin is warm and dry. No rash noted. She is not diaphoretic. No erythema. No pallor.   Nursing note and vitals reviewed.      Fluids  No intake or output data in the 24 hours ending 07/22/19 1203    Laboratory  Recent Labs      07/22/19   0330   WBC  5.8   RBC  3.84*   HEMOGLOBIN  11.3*   HEMATOCRIT  36.3*   MCV  94.5   MCH  29.4   MCHC  31.1*   RDW  55.6*   PLATELETCT  258   MPV  9.2     Recent Labs      07/22/19   0330   SODIUM  136   POTASSIUM  3.7   CHLORIDE  103   CO2  24   GLUCOSE  106*   BUN  13   CREATININE  0.62   CALCIUM  10.0                   Imaging  CTA ABDOMEN PELVIS W & W/O POST PROCESS   Final Result      1.  Interval enlargement of a hyperdense ovoid masslike structure immediately posterior to the right ilium, possibly representing a pseudoaneurysm.   2.  Stable thin-walled apparent fluid-filled structure immediately deep to the right ilium.   3.   Minimal retroperitoneal adenopathy, grossly stable.   4.  Cholelithiasis.      MR-BRAIN-WITH & W/O   Final Result      1.  Innumerable nodular enhancing lesions throughout the brain parenchyma both the supra and infratentorial compartments predominantly near the gray-white junction. There has been interval increase in size of several of  these lesions since previous exam.    These changes may be secondary to metastatic disease or granulomatous disease.      2.  Interval right parietal craniotomy with underlying elliptical postsurgical defect.      3.  Interval increase in size of index right thalamic lesion which has increased vasogenic edema since previous exam.      4.  Age-related cerebral atrophy.      IR-PICC LINE PLACEMENT W/ GUIDANCE > AGE 5   Final Result                  Ultrasound-guided PICC placement performed by qualified nursing staff as    above.          CT-NEEDLE BX-ABD-RETROPERITONL   Final Result      1.  CT GUIDED biopsy of a right pelvic phlegmon.      2. Significant interval enlargement of a right gluteal pseudoaneurysm. CTA and consideration for possible intervention either with direct thrombin injection or endovascular treatment was suggested. This was conveyed to Dr. Alatorre on 7/11/2019 via [place]    text at 1750 hours.      [id number] VIEW   Final Result         1.  Moderate stool in the colon suggests changes of constipation, otherwise nonspecific bowel gas pattern   2.  Atherosclerosis      US-EXTREMITY VENOUS LOWER BILAT   Final Result      IR-US GUIDED PIV   Final Result    Ultrasound-guided PERIPHERAL IV INSERTION performed by    qualified nursing staff as above.            MR-LUMBAR SPINE-WITH & W/O   Final Result         1.  Grade 2-3 spondylolisthesis at the L5-S1 level with bilateral spondylolysis of the pars interarticularis. This causes severe bilateral neural foraminal stenosis at this level.      2.  Interval removal of posterior fusion hardware at the lumbosacral junction.      3.  Diffuse osteomyelitis involving the L5 vertebral body and the first 3 sacral segments.      4.  Chronic discitis at the L5-S1 level with anterior paraspinous abscess at this level and anterior to the S1 sacral segment.      5.  Smaller intraosseous bone bone abscesses or areas of necrosis noted in the sacrum.      6.  There is also  evidence of osteomyelitis involving the jose antonio and sacrum bilaterally along the course of the previous sacral fixation screw. There is a large 4 cm abscess noted in the right gluteal soft tissues in a smaller 3 cm chronic appearing    abscess in the left gluteal musculature.      7.  There is a 1.5 cm intraosseous abscess or area of necrosis in the right posterior ilium.      8.  There is a 3.3 cm centimeters multiloculated abscess anterior to the right sacroiliac joint.      MR-STEALTH BRAIN WITH & W/O   Final Result         1.  Innumerable subcentimeter brain metastases, many at the gray-white junction but also multiple noted throughout the basal ganglia and brainstem.      2.  Largest index lesion is noted in the right thalamus and has increased in size since previous exam and currently measures 9 mm and has a moderate amount of surrounding vasogenic edema.      US-EXTREMITY NON VASCULAR UNILATERAL RIGHT   Final Result      No right hip joint effusion. No soft tissue fluid collection.      MR-BRAIN-WITH & W/O   Final Result      1.  Innumerable supra and infratentorial enhancing nodules are again noted throughout the brain parenchyma with multiple lesions appearing somewhat larger and with increased enhancement. No associated diffusion restriction. Unchanged differential    considerations including bacterial or fungal infection versus metastatic disease.   2.  Mild diffuse cerebral substance loss.   3.  Mild microangiopathic ischemic change versus demyelination or gliosis.      CT-NEEDLE BX-DEEP BONE   Final Result      CT GUIDED RIGHT ILIUM DEEP BONE BIOPSY. SAMPLES WERE SENT TO MICROBIOLOGY FOR ANALYSIS.      CT-PELVIS WITH   Final Result         1.  No significant interval change in the size of the RIGHT iliac muscle fluid collections   2.  Hyperdense RIGHT gluteal lesion moderately suspicious for pseudoaneurysm with adjacent hematoma.   3.  Changes in the RIGHT iliac bone, BILATERAL sacrum and LEFT iliac bone  suspicious for osteomyelitis   4.  Changes at L5-S1 suspicious for discitis osteomyelitis      CT-PELVIS WITH   Final Result      1.  Residual or recurrent abscess within the right iliacus muscle measuring 2.5 x 1.8 cm. It slightly smaller than on 5/28/2019      2.  Interval removal of hardware from the iliac bones and sacrum      3.  Lytic change of the right iliac bone and the sacrum. This could be related to hardware versus osteomyelitis.      4.  Subacute fractures of the right ilium, sacrum, and there is lucency within the left iliac bone that is likely from hardware      MR-BRAIN-WITH & W/O   Final Result      1.  Interval progression of supra and infratentorial intra-axial nodules and associated edema. This short-term interval progression favors infection over neoplasm though both remain considerations.   2.  Mild atrophy      DX-PORTABLE FLUOROSCOPY < 1 HOUR   Final Result         Portable fluoroscopy utilized for 2 minutes.      DX-PELVIS-1 OR 2 VIEWS   Final Result      Status post hardware removal from the right SI joint      DX-CHEST-PORTABLE (1 VIEW)   Final Result      Interstitial prominence could be due to pulmonary edema or hypoventilatory change.      DX-CHEST-LIMITED (1 VIEW)   Final Result      LEFT basilar underinflation atelectasis or pneumonia      CT-DRAIN-HEMATOMA - SEROMA   Final Result      CT-guided RIGHT pelvic fluid collection aspiration, laboratory evaluation pending              Assessment/Plan  * Brain lesion- (present on admission)   Assessment & Plan    Right-sided craniotomy for resection of superficial mass  6/28/2019  Continue Isoniazid, Rifampin, Ethambutol, Pyrazinamide per ID  Continue Keppra for seizure prophylaxis     Granulomatous disease - (present on admission)   Assessment & Plan    Isoniazid, Rifampin, Ethambutol, Pyrazinamide     Sepsis (HCC)- (present on admission)   Assessment & Plan    This is sepsis (without associated acute organ dysfunction).    Source - Right  Iliac and Gluteus medius abscess     Abscess of right iliac muscle and right gluteus medius muscle- (present on admission)   Assessment & Plan    CT guided pelvic aspiration 5/30/2019  Hardware removal, pelvis 6/5/2019  CT guided deep bone biopsy 6/19/2019  CT guided aspiration/biopsy of a R gluteal fluid collection/phlegmon 7/11/2019  Isoniazid, Rifampin, Ethambutol, Pyrazinamide       Hypokalemia- (present on admission)   Assessment & Plan    Will recheck     Osteomyelitis (HCC)- (present on admission)   Assessment & Plan    Query  ID following     Pseudoaneurysm following procedure (HCC)- (present on admission)   Assessment & Plan    CTA of abd/pelvis shows possible pseudoaneurysm increased in size  Message left with IR to discuss     Hypomagnesemia- (present on admission)   Assessment & Plan    Oral magnesium     Dysphagia- (present on admission)   Assessment & Plan    Dysphagia 3  Speech following     Hypercalcemia- (present on admission)   Assessment & Plan    Will recheck     Essential hypertension- (present on admission)   Assessment & Plan    Continue Metoprolol, Losartan and Amlodipine as tolerated     Chronic hyponatremia- (present on admission)   Assessment & Plan    Follow intermittently     COPD (chronic obstructive pulmonary disease) (HCC)- (present on admission)   Assessment & Plan    RT protocol  No s/o exacerbation     Non-severe protein-calorie malnutrition (HCC)- (present on admission)   Assessment & Plan    Nutrition consult       History of DVT (deep vein thrombosis)- (present on admission)   Assessment & Plan    Hold Xarelto     RLS (restless legs syndrome)- (present on admission)   Assessment & Plan    Pramipexole     Chronic pain- (present on admission)   Assessment & Plan    See above  Suspect opiates contributing to confusion  Titration as tolerated       History of breast cancer- (present on admission)   Assessment & Plan    history left mastectomy and breast implant.            VTE  prophylaxis: Heparin

## 2019-07-22 NOTE — CARE PLAN
Problem: Safety  Goal: Will remain free from injury  Outcome: PROGRESSING AS EXPECTED  Call light within reach. Bed locked and in lowest position. Bed alarm on. Room near nurses station. Rounding maintained.    Problem: Pain Management  Goal: Pain level will decrease to patient's comfort goal  Outcome: PROGRESSING AS EXPECTED      Problem: Urinary Elimination:  Goal: Ability to reestablish a normal urinary elimination pattern will improve  Outcome: PROGRESSING AS EXPECTED  Pt occasionally incontinent. Adequate output.

## 2019-07-23 LAB
BASOPHILS # BLD AUTO: 0.7 % (ref 0–1.8)
BASOPHILS # BLD: 0.03 K/UL (ref 0–0.12)
EOSINOPHIL # BLD AUTO: 0.23 K/UL (ref 0–0.51)
EOSINOPHIL NFR BLD: 5.1 % (ref 0–6.9)
ERYTHROCYTE [DISTWIDTH] IN BLOOD BY AUTOMATED COUNT: 53.9 FL (ref 35.9–50)
FUNGUS SPEC CULT: NORMAL
HCT VFR BLD AUTO: 33.8 % (ref 37–47)
HGB BLD-MCNC: 10.9 G/DL (ref 12–16)
IMM GRANULOCYTES # BLD AUTO: 0.01 K/UL (ref 0–0.11)
IMM GRANULOCYTES NFR BLD AUTO: 0.2 % (ref 0–0.9)
LYMPHOCYTES # BLD AUTO: 1.18 K/UL (ref 1–4.8)
LYMPHOCYTES NFR BLD: 26.1 % (ref 22–41)
MCH RBC QN AUTO: 30.3 PG (ref 27–33)
MCHC RBC AUTO-ENTMCNC: 32.2 G/DL (ref 33.6–35)
MCV RBC AUTO: 93.9 FL (ref 81.4–97.8)
MONOCYTES # BLD AUTO: 0.53 K/UL (ref 0–0.85)
MONOCYTES NFR BLD AUTO: 11.7 % (ref 0–13.4)
NEUTROPHILS # BLD AUTO: 2.54 K/UL (ref 2–7.15)
NEUTROPHILS NFR BLD: 56.2 % (ref 44–72)
NRBC # BLD AUTO: 0 K/UL
NRBC BLD-RTO: 0 /100 WBC
PLATELET # BLD AUTO: 235 K/UL (ref 164–446)
PMV BLD AUTO: 9.1 FL (ref 9–12.9)
RBC # BLD AUTO: 3.6 M/UL (ref 4.2–5.4)
SIGNIFICANT IND 70042: NORMAL
SITE SITE: NORMAL
SOURCE SOURCE: NORMAL
WBC # BLD AUTO: 4.5 K/UL (ref 4.8–10.8)

## 2019-07-23 PROCEDURE — A9270 NON-COVERED ITEM OR SERVICE: HCPCS | Performed by: FAMILY MEDICINE

## 2019-07-23 PROCEDURE — 99232 SBSQ HOSP IP/OBS MODERATE 35: CPT | Performed by: HOSPITALIST

## 2019-07-23 PROCEDURE — A9270 NON-COVERED ITEM OR SERVICE: HCPCS | Performed by: HOSPITALIST

## 2019-07-23 PROCEDURE — 99233 SBSQ HOSP IP/OBS HIGH 50: CPT | Performed by: INTERNAL MEDICINE

## 2019-07-23 PROCEDURE — 700102 HCHG RX REV CODE 250 W/ 637 OVERRIDE(OP): Performed by: HOSPITALIST

## 2019-07-23 PROCEDURE — 700102 HCHG RX REV CODE 250 W/ 637 OVERRIDE(OP): Performed by: INTERNAL MEDICINE

## 2019-07-23 PROCEDURE — 97535 SELF CARE MNGMENT TRAINING: CPT

## 2019-07-23 PROCEDURE — 700101 HCHG RX REV CODE 250: Performed by: FAMILY MEDICINE

## 2019-07-23 PROCEDURE — 700102 HCHG RX REV CODE 250 W/ 637 OVERRIDE(OP): Performed by: FAMILY MEDICINE

## 2019-07-23 PROCEDURE — 770001 HCHG ROOM/CARE - MED/SURG/GYN PRIV*

## 2019-07-23 PROCEDURE — 85025 COMPLETE CBC W/AUTO DIFF WBC: CPT

## 2019-07-23 PROCEDURE — 700111 HCHG RX REV CODE 636 W/ 250 OVERRIDE (IP): Performed by: HOSPITALIST

## 2019-07-23 PROCEDURE — A9270 NON-COVERED ITEM OR SERVICE: HCPCS | Performed by: INTERNAL MEDICINE

## 2019-07-23 RX ADMIN — FERROUS SULFATE TAB 325 MG (65 MG ELEMENTAL FE) 325 MG: 325 (65 FE) TAB at 07:53

## 2019-07-23 RX ADMIN — PRAMIPEXOLE DIHYDROCHLORIDE 0.25 MG: 0.5 TABLET ORAL at 17:40

## 2019-07-23 RX ADMIN — CYCLOBENZAPRINE 10 MG: 10 TABLET, FILM COATED ORAL at 17:39

## 2019-07-23 RX ADMIN — PYRIDOXINE HCL TAB 50 MG 100 MG: 50 TAB at 06:00

## 2019-07-23 RX ADMIN — GABAPENTIN 400 MG: 400 CAPSULE ORAL at 05:27

## 2019-07-23 RX ADMIN — ACETAMINOPHEN 500 MG: 500 TABLET ORAL at 11:44

## 2019-07-23 RX ADMIN — SUCRALFATE 1 G: 1 SUSPENSION ORAL at 11:35

## 2019-07-23 RX ADMIN — POTASSIUM CHLORIDE 20 MEQ: 20 TABLET, EXTENDED RELEASE ORAL at 05:28

## 2019-07-23 RX ADMIN — RIFAMPIN 600 MG: 300 CAPSULE ORAL at 05:35

## 2019-07-23 RX ADMIN — GABAPENTIN 400 MG: 400 CAPSULE ORAL at 17:40

## 2019-07-23 RX ADMIN — LEVETIRACETAM 500 MG: 500 TABLET, FILM COATED ORAL at 05:27

## 2019-07-23 RX ADMIN — SUCRALFATE 1 G: 1 SUSPENSION ORAL at 05:27

## 2019-07-23 RX ADMIN — CINACALCET HYDROCHLORIDE 60 MG: 30 TABLET, COATED ORAL at 05:36

## 2019-07-23 RX ADMIN — PRAMIPEXOLE DIHYDROCHLORIDE 0.25 MG: 0.5 TABLET ORAL at 11:35

## 2019-07-23 RX ADMIN — GABAPENTIN 400 MG: 400 CAPSULE ORAL at 11:35

## 2019-07-23 RX ADMIN — LOSARTAN POTASSIUM 75 MG: 50 TABLET ORAL at 05:27

## 2019-07-23 RX ADMIN — ETHAMBUTOL HYDROCHLORIDE 800 MG: 400 TABLET, FILM COATED ORAL at 05:36

## 2019-07-23 RX ADMIN — OMEPRAZOLE 20 MG: 20 CAPSULE, DELAYED RELEASE ORAL at 05:26

## 2019-07-23 RX ADMIN — METOPROLOL TARTRATE 25 MG: 25 TABLET, FILM COATED ORAL at 17:40

## 2019-07-23 RX ADMIN — ISONIAZID 300 MG: 300 TABLET ORAL at 06:00

## 2019-07-23 RX ADMIN — PRAMIPEXOLE DIHYDROCHLORIDE 0.25 MG: 0.5 TABLET ORAL at 05:35

## 2019-07-23 RX ADMIN — SENNOSIDES, DOCUSATE SODIUM 2 TABLET: 50; 8.6 TABLET, FILM COATED ORAL at 05:28

## 2019-07-23 RX ADMIN — OXYCODONE HYDROCHLORIDE 10 MG: 10 TABLET, FILM COATED, EXTENDED RELEASE ORAL at 05:35

## 2019-07-23 RX ADMIN — METOPROLOL TARTRATE 25 MG: 25 TABLET, FILM COATED ORAL at 06:00

## 2019-07-23 RX ADMIN — SUCRALFATE 1 G: 1 SUSPENSION ORAL at 17:40

## 2019-07-23 RX ADMIN — AMLODIPINE BESYLATE 10 MG: 5 TABLET ORAL at 05:27

## 2019-07-23 RX ADMIN — DIPHENHYDRAMINE HYDROCHLORIDE, ZINC ACETATE: 2; .1 CREAM TOPICAL at 11:35

## 2019-07-23 RX ADMIN — LEVETIRACETAM 500 MG: 500 TABLET, FILM COATED ORAL at 17:40

## 2019-07-23 RX ADMIN — TEMAZEPAM 15 MG: 15 CAPSULE ORAL at 20:46

## 2019-07-23 RX ADMIN — SENNOSIDES, DOCUSATE SODIUM 2 TABLET: 50; 8.6 TABLET, FILM COATED ORAL at 17:40

## 2019-07-23 RX ADMIN — HEPARIN SODIUM 5000 UNITS: 5000 INJECTION, SOLUTION INTRAVENOUS; SUBCUTANEOUS at 05:26

## 2019-07-23 RX ADMIN — LIDOCAINE 2 PATCH: 50 PATCH CUTANEOUS at 11:35

## 2019-07-23 RX ADMIN — ATORVASTATIN CALCIUM 10 MG: 10 TABLET, FILM COATED ORAL at 17:40

## 2019-07-23 RX ADMIN — HEPARIN SODIUM 5000 UNITS: 5000 INJECTION, SOLUTION INTRAVENOUS; SUBCUTANEOUS at 17:40

## 2019-07-23 RX ADMIN — OXYCODONE HYDROCHLORIDE 10 MG: 10 TABLET, FILM COATED, EXTENDED RELEASE ORAL at 17:39

## 2019-07-23 RX ADMIN — OXYCODONE HYDROCHLORIDE 5 MG: 5 TABLET ORAL at 07:54

## 2019-07-23 RX ADMIN — PYRAZINAMIDE 1000 MG: 500 TABLET ORAL at 05:35

## 2019-07-23 RX ADMIN — Medication 1 CAPSULE: at 07:53

## 2019-07-23 ASSESSMENT — COGNITIVE AND FUNCTIONAL STATUS - GENERAL
PERSONAL GROOMING: A LITTLE
HELP NEEDED FOR BATHING: A LOT
DRESSING REGULAR LOWER BODY CLOTHING: A LOT
SUGGESTED CMS G CODE MODIFIER DAILY ACTIVITY: CK
DAILY ACTIVITIY SCORE: 16
TOILETING: A LOT
DRESSING REGULAR UPPER BODY CLOTHING: A LITTLE

## 2019-07-23 ASSESSMENT — ENCOUNTER SYMPTOMS
ABDOMINAL PAIN: 0
CHILLS: 0
DIZZINESS: 0
FOCAL WEAKNESS: 0
BLURRED VISION: 0
NERVOUS/ANXIOUS: 0
FEVER: 0
SHORTNESS OF BREATH: 0
DIARRHEA: 0
WHEEZING: 0
SPEECH CHANGE: 0
SENSORY CHANGE: 0
HEADACHES: 0
NECK PAIN: 0
WEAKNESS: 0
SORE THROAT: 0
VOMITING: 0
HEARTBURN: 0
COUGH: 0
BACK PAIN: 0
PALPITATIONS: 0
NAUSEA: 0

## 2019-07-23 NOTE — DIETARY
Nutrition Services: Update   Day 55 of admit.  Muriel Martini is a 68 y.o. female with admitting DX of iliac abscess, Sepsis     Pt is currently on Regular Dys 3 diet w/thin liquids. Wt 48.7 kg via bed scale (7/20) - admit weight 54.4 kg via bed scale (5/29). Weight down 5.7 kg from admit. No edema on admit. Some weight loss expected during lengthy hospital stay due to decreased activity and muscle loss. High protein milkshake BID. PO generally %. Pt has changed beds/floors multiple times - may account for weight change. Attempted visit, pt asleep and did not wake to name called.     Malnutrition Risk (from RD assessment 5/30): Pt with moderate malnutriton in context of chronic illness related to multiple abscesses as evidenced by potential 15% weight loss in 1 month and moderate muscle wasting at temporal region and mild fat loss at orbital fat pads.      Recommendations/Plan:  1. High protein milkshake BID   2. Encourage intake of meals  3. Document intake of all meals as % taken in ADL's to provide interdisciplinary communication across all shifts.   4. Monitor weight.  5. Nutrition rep will continue to see patient for ongoing meal and snack preferences.      RD following weekly

## 2019-07-23 NOTE — PROGRESS NOTES
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 Critical lab result read back .   Dr. Malhotra notified of critical lab result at 1940.  Critical lab result read back by Dr. Malhotra.

## 2019-07-23 NOTE — CARE PLAN
Problem: Safety  Goal: Will remain free from injury  Outcome: PROGRESSING AS EXPECTED      Problem: Mobility  Goal: Risk for activity intolerance will decrease  Outcome: PROGRESSING SLOWER THAN EXPECTED  Pt has been asking for bedpan vs walking... Educated that she will be required to walk to restroom from this point forward as she is able to do so.    Problem: Pain Management  Goal: Pain level will decrease to patient's comfort goal  Outcome: PROGRESSING AS EXPECTED  Pt has had c/o abdominal pain. Got GI cocktail ordered. Will continue to monitor.

## 2019-07-23 NOTE — THERAPY
"Occupational Therapy Evaluation completed.   Plan of Care: Will benefit from Occupational Therapy 3 times per week  Discharge Recommendations:  Equipment: Will Continue to Assess for Equipment Needs. Post-acute therapy Recommend post-acute placement for additional occupational therapy services prior to discharge home. Patient can tolerate post-acute therapies at a 5x/week frequency.    Patient seen for OT treat focused on seated ADLs and mobility necessitating S-Max A ADLs and S-Mod A mobility. Patient c/o back pain referring to itch, visible scratches and blood. Nurse aware. Patient would benefit from skilled OT in this setting followed by post acute placement. Of note, R hip bone + TB mycobacterium and abscesses.     See \"Rehab Therapy-Acute\" Patient Summary Report for complete documentation.    "

## 2019-07-23 NOTE — CARE PLAN
Problem: Communication  Goal: The ability to communicate needs accurately and effectively will improve  Outcome: PROGRESSING SLOWER THAN EXPECTED  Pt sometimes starts speaking Citizen of [place] or mixes [place] into her english, making it difficult to understand.  She also frequently mumbles. Redirecting patient to remember to speak clearly seems to improve this.    Problem: Safety  Goal: Will remain free from injury  Outcome: PROGRESSING AS EXPECTED  Pt calls appropriately and waits for assistance.    Problem: Pain Management  Goal: Pain level will decrease to patient's comfort goal  Outcome: PROGRESSING AS EXPECTED

## 2019-07-23 NOTE — CARE PLAN
Problem: Safety  Goal: Will remain free from injury    Intervention: Provide assistance with mobility  Patient needs assistance for mobility with rn and walker

## 2019-07-23 NOTE — PROGRESS NOTES
Pt continues to refuse SCD's because it exacerbates her restless leg syndrome. Pt has been educated on purpose of DVT prevention equipment and states she can't tolerate the SCDs. Pt is getting heparin BID.

## 2019-07-23 NOTE — OR SURGEON
Immediate Post- Operative Note        PostOp Diagnosis: RIGHT BUTTOCK 3.3 CM PSEUDOANEURYSM IN AREA OF PREVIOUS GLUTEAL ABSCESS      Procedure(s): US GUIDED THROMBIN INJECTION FOR RX PSEUDOANEURYSM    NO IV SEDATION GIVEN.     LOCAL 3 ML 1% LIDOCAINE. 22G SPINAL NEEDLE PLACED IN PSEUDOANEURYSM.      1900 U BOVINE THROMBIN INJECTED UNDER REAL TIME US. PROMPT THROMBOSIS OF PSEUDOANEURYSM OBSERVED WITH ECHOGENIC CLOT FILLING THE CAVITY.       Estimated Blood Loss: Less than 5 ml        Complications: None            7/22/2019     5:08 PM     Primo Persaud

## 2019-07-23 NOTE — CARE PLAN
Problem: Communication  Goal: The ability to communicate needs accurately and effectively will improve    Intervention: Beyer patient and significant other/support system to call light to alert staff of needs  Patient using call bell on and off appropriately reinforcement needed at times will continue to monitor

## 2019-07-23 NOTE — PROGRESS NOTES
[facility] Medicine Daily Progress Note    Date of Service  7/23/2019    Chief Complaint  68 y.o. female admitted 5/29/2019 with right iliac and gluteus abscess    Hospital Course  Patient is a 68 year old with history of breast cancer, copd, dm, gerd, dl and htn who was admitted with right iliac gluteus abscess.  She also has a known history of brain lesions with increasing enhancement noted on MRI and possible chronic osteomyelitis of the lumbar spine.    Interval Problem Update  Axox3  Reports hip pain 4/10  No sob  No dizziness  Ros otherwise negative    Consultants/Specialty  ID  Neurosurgery    Code Status  Full    Disposition  TBD    Review of Systems  Review of Systems   Constitutional: Negative for chills, fever and malaise/fatigue.   HENT: Negative for hearing loss and sore throat.    Eyes: Negative for blurred vision.   Respiratory: Negative for cough, shortness of breath and wheezing.    Cardiovascular: Negative for chest pain, palpitations and leg swelling.   Gastrointestinal: Negative for abdominal pain, diarrhea, heartburn, nausea and vomiting.   Genitourinary: Negative for dysuria.   Musculoskeletal: Negative for back pain, joint pain and neck pain.   Skin: Negative for rash.   Neurological: Negative for dizziness, sensory change, speech change, focal weakness, weakness and headaches.   Psychiatric/Behavioral: The patient is not nervous/anxious.         Physical Exam  Temp:  [36.1 °C (97 °F)-36.2 °C (97.2 °F)] 36.2 °C (97.2 °F)  Pulse:  [] 80  Resp:  [14-25] 18  BP: (132-159)/(71-79) 132/72  SpO2:  [95 %-100 %] 99 %    Physical Exam   Constitutional: She appears well-developed and well-nourished. No distress.   HENT:   Head: Normocephalic and atraumatic.   Mouth/Throat: Oropharynx is clear and moist.   Surgical site cdi   Eyes: Pupils are equal, round, and reactive to light. Conjunctivae are normal.   Neck: Normal range of motion. Neck supple.   Cardiovascular: Normal rate, regular rhythm, normal  heart sounds and intact distal pulses.  Exam reveals no gallop and no friction rub.    No murmur heard.  Pulmonary/Chest: Effort normal and breath sounds normal. No respiratory distress. She has no wheezes. She has no rales. She exhibits no tenderness.   Abdominal: Soft. Bowel sounds are normal. She exhibits no distension and no mass. There is no tenderness. There is no rebound and no guarding.   Musculoskeletal: Normal range of motion. She exhibits no edema or tenderness.   Neurological: She is alert. She has normal reflexes. No cranial nerve deficit. She exhibits normal muscle tone. Coordination normal.   Skin: Skin is warm and dry. No rash noted. She is not diaphoretic. No erythema. No pallor.   Nursing note and vitals reviewed.      Fluids  No intake or output data in the 24 hours ending 07/23/19 1039    Laboratory  Recent Labs      07/22/19   0330  07/23/19   0553   WBC  5.8  4.5*   RBC  3.84*  3.60*   HEMOGLOBIN  11.3*  10.9*   HEMATOCRIT  36.3*  33.8*   MCV  94.5  93.9   MCH  29.4  30.3   MCHC  31.1*  32.2*   RDW  55.6*  53.9*   PLATELETCT  258  235   MPV  9.2  9.1     Recent Labs      07/22/19   0330   SODIUM  136   POTASSIUM  3.7   CHLORIDE  103   CO2  24   GLUCOSE  106*   BUN  13   CREATININE  0.62   CALCIUM  10.0                   Imaging  IR-INJ-EXT PSEUDOANEURYSM PERC   Final Result      1.  Ultrasound guided thrombin injection for treatment of a RIGHT gluteal 3.6 cm pseudoaneurysm.      CTA ABDOMEN PELVIS W & W/O POST PROCESS   Final Result      1.  Interval enlargement of a hyperdense ovoid masslike structure immediately posterior to the right ilium, possibly representing a pseudoaneurysm.   2.  Stable thin-walled apparent fluid-filled structure immediately deep to the right ilium.   3.   Minimal retroperitoneal adenopathy, grossly stable.   4.  Cholelithiasis.      MR-BRAIN-WITH & W/O   Final Result      1.  Innumerable nodular enhancing lesions throughout the brain parenchyma both the supra and  infratentorial compartments predominantly near the gray-white junction. There has been interval increase in size of several of these lesions since previous exam.    These changes may be secondary to metastatic disease or granulomatous disease.      2.  Interval right parietal craniotomy with underlying elliptical postsurgical defect.      3.  Interval increase in size of index right thalamic lesion which has increased vasogenic edema since previous exam.      4.  Age-related cerebral atrophy.      IR-PICC LINE PLACEMENT W/ GUIDANCE > AGE 5   Final Result                  Ultrasound-guided PICC placement performed by qualified nursing staff as    above.          CT-NEEDLE BX-ABD-RETROPERITONL   Final Result      1.  CT GUIDED biopsy of a right pelvic phlegmon.      2. Significant interval enlargement of a right gluteal pseudoaneurysm. CTA and consideration for possible intervention either with direct thrombin injection or endovascular treatment was suggested. This was conveyed to Dr. Alatorre on 7/11/2019 via [place]    text at 1750 hours.      [place]-[id number]-0 VIEW   Final Result         1.  Moderate stool in the colon suggests changes of constipation, otherwise nonspecific bowel gas pattern   2.  Atherosclerosis      US-EXTREMITY VENOUS LOWER BILAT   Final Result      IR-US GUIDED PIV   Final Result    Ultrasound-guided PERIPHERAL IV INSERTION performed by    qualified nursing staff as above.            MR-LUMBAR SPINE-WITH & W/O   Final Result         1.  Grade 2-3 spondylolisthesis at the L5-S1 level with bilateral spondylolysis of the pars interarticularis. This causes severe bilateral neural foraminal stenosis at this level.      2.  Interval removal of posterior fusion hardware at the lumbosacral junction.      3.  Diffuse osteomyelitis involving the L5 vertebral body and the first 3 sacral segments.      4.  Chronic discitis at the L5-S1 level with anterior paraspinous abscess at this level and anterior to the S1  sacral segment.      5.  Smaller intraosseous bone bone abscesses or areas of necrosis noted in the sacrum.      6.  There is also evidence of osteomyelitis involving the jose antonio and sacrum bilaterally along the course of the previous sacral fixation screw. There is a large 4 cm abscess noted in the right gluteal soft tissues in a smaller 3 cm chronic appearing    abscess in the left gluteal musculature.      7.  There is a 1.5 cm intraosseous abscess or area of necrosis in the right posterior ilium.      8.  There is a 3.3 cm centimeters multiloculated abscess anterior to the right sacroiliac joint.      MR-STEALTH BRAIN WITH & W/O   Final Result         1.  Innumerable subcentimeter brain metastases, many at the gray-white junction but also multiple noted throughout the basal ganglia and brainstem.      2.  Largest index lesion is noted in the right thalamus and has increased in size since previous exam and currently measures 9 mm and has a moderate amount of surrounding vasogenic edema.      US-EXTREMITY NON VASCULAR UNILATERAL RIGHT   Final Result      No right hip joint effusion. No soft tissue fluid collection.      MR-BRAIN-WITH & W/O   Final Result      1.  Innumerable supra and infratentorial enhancing nodules are again noted throughout the brain parenchyma with multiple lesions appearing somewhat larger and with increased enhancement. No associated diffusion restriction. Unchanged differential    considerations including bacterial or fungal infection versus metastatic disease.   2.  Mild diffuse cerebral substance loss.   3.  Mild microangiopathic ischemic change versus demyelination or gliosis.      CT-NEEDLE BX-DEEP BONE   Final Result      CT GUIDED RIGHT ILIUM DEEP BONE BIOPSY. SAMPLES WERE SENT TO MICRO[facility]OLOGY FOR ANALYSIS.      CT-PELVIS WITH   Final Result         1.  No significant interval change in the size of the RIGHT iliac muscle fluid collections   2.  Hyperdense RIGHT gluteal lesion moderately  suspicious for pseudoaneurysm with adjacent hematoma.   3.  Changes in the RIGHT iliac bone, BILATERAL sacrum and LEFT iliac bone suspicious for osteomyelitis   4.  Changes at L5-S1 suspicious for discitis osteomyelitis      CT-PELVIS WITH   Final Result      1.  Residual or recurrent abscess within the right iliacus muscle measuring 2.5 x 1.8 cm. It slightly smaller than on 5/28/2019      2.  Interval removal of hardware from the iliac bones and sacrum      3.  Lytic change of the right iliac bone and the sacrum. This could be related to hardware versus osteomyelitis.      4.  Subacute fractures of the right ilium, sacrum, and there is lucency within the left iliac bone that is likely from hardware      MR-BRAIN-WITH & W/O   Final Result      1.  Interval progression of supra and infratentorial intra-axial nodules and associated edema. This short-term interval progression favors infection over neoplasm though both remain considerations.   2.  Mild atrophy      DX-PORTABLE FLUOROSCOPY < 1 HOUR   Final Result         Portable fluoroscopy utilized for 2 minutes.      DX-PELVIS-1 OR 2 VIEWS   Final Result      Status post hardware removal from the right SI joint      DX-CHEST-PORTABLE (1 VIEW)   Final Result      Interstitial prominence could be due to pulmonary edema or hypoventilatory change.      DX-CHEST-LIMITED (1 VIEW)   Final Result      LEFT basilar underinflation atelectasis or pneumonia      CT-DRAIN-HEMATOMA - SEROMA   Final Result      CT-guided RIGHT pelvic fluid collection aspiration, laboratory evaluation pending         US-EXTREMITY ARTERY LOWER UNILAT RIGHT    (Results Pending)        Assessment/Plan  * Brain lesion- (present on admission)   Assessment & Plan    Right-sided craniotomy for resection of superficial mass  6/28/2019  Continue Isoniazid, Rifampin, Ethambutol, Pyrazinamide per ID  Continue Keppra for seizure prophylaxis     Granulomatous disease - (present on admission)   Assessment & Plan     Isoniazid, Rifampin, Ethambutol, Pyrazinamide     Sepsis (HCC)- (present on admission)   Assessment & Plan    This is sepsis (without associated acute organ dysfunction).    Source - Right Iliac and Gluteus medius abscess     Abscess of right iliac muscle and right gluteus medius muscle- (present on admission)   Assessment & Plan    CT guided pelvic aspiration 5/30/2019  Hardware removal, pelvis 6/5/2019  CT guided deep bone biopsy 6/19/2019  CT guided aspiration/biopsy of a R gluteal fluid collection/phlegmon 7/11/2019 - consistent with TB  Isoniazid, Rifampin, Ethambutol, Pyrazinamide       Hypokalemia- (present on admission)   Assessment & Plan    Will recheck     Osteomyelitis (HCC)- (present on admission)   Assessment & Plan    Query  ID following     Pseudoaneurysm following procedure (HCC)- (present on admission)   Assessment & Plan      POD #1 s/p IR thrombin injection for thrombosed psedudoanerysm     Hypomagnesemia- (present on admission)   Assessment & Plan    Oral magnesium     Dysphagia- (present on admission)   Assessment & Plan    Dysphagia 3  Speech following     Essential hypertension- (present on admission)   Assessment & Plan    Continue Metoprolol, Losartan and Amlodipine as tolerated     COPD (chronic obstructive pulmonary disease) (HCC)- (present on admission)   Assessment & Plan    RT protocol  No s/o exacerbation     Non-severe protein-calorie malnutrition (HCC)- (present on admission)   Assessment & Plan    Nutrition consult       History of DVT (deep vein thrombosis)- (present on admission)   Assessment & Plan    Hold Xarelto     RLS (restless legs syndrome)- (present on admission)   Assessment & Plan    Pramipexole     Chronic pain- (present on admission)   Assessment & Plan      Suspect opiates contributing to confusion - improving with titration  Continue supportive care       History of breast cancer- (present on admission)   Assessment & Plan    history left mastectomy and breast  implant.            VTE prophylaxis: Heparin

## 2019-07-24 LAB
BASOPHILS # BLD AUTO: 0.7 % (ref 0–1.8)
BASOPHILS # BLD: 0.03 K/UL (ref 0–0.12)
EOSINOPHIL # BLD AUTO: 0.29 K/UL (ref 0–0.51)
EOSINOPHIL NFR BLD: 6.6 % (ref 0–6.9)
ERYTHROCYTE [DISTWIDTH] IN BLOOD BY AUTOMATED COUNT: 54.6 FL (ref 35.9–50)
HCT VFR BLD AUTO: 32.3 % (ref 37–47)
HGB BLD-MCNC: 9.9 G/DL (ref 12–16)
IMM GRANULOCYTES # BLD AUTO: 0.02 K/UL (ref 0–0.11)
IMM GRANULOCYTES NFR BLD AUTO: 0.5 % (ref 0–0.9)
LYMPHOCYTES # BLD AUTO: 1.26 K/UL (ref 1–4.8)
LYMPHOCYTES NFR BLD: 28.5 % (ref 22–41)
MCH RBC QN AUTO: 28.9 PG (ref 27–33)
MCHC RBC AUTO-ENTMCNC: 30.7 G/DL (ref 33.6–35)
MCV RBC AUTO: 94.4 FL (ref 81.4–97.8)
MONOCYTES # BLD AUTO: 0.55 K/UL (ref 0–0.85)
MONOCYTES NFR BLD AUTO: 12.4 % (ref 0–13.4)
NEUTROPHILS # BLD AUTO: 2.27 K/UL (ref 2–7.15)
NEUTROPHILS NFR BLD: 51.3 % (ref 44–72)
NRBC # BLD AUTO: 0 K/UL
NRBC BLD-RTO: 0 /100 WBC
PLATELET # BLD AUTO: 236 K/UL (ref 164–446)
PMV BLD AUTO: 8.9 FL (ref 9–12.9)
RBC # BLD AUTO: 3.42 M/UL (ref 4.2–5.4)
WBC # BLD AUTO: 4.4 K/UL (ref 4.8–10.8)

## 2019-07-24 PROCEDURE — 700102 HCHG RX REV CODE 250 W/ 637 OVERRIDE(OP): Performed by: FAMILY MEDICINE

## 2019-07-24 PROCEDURE — 700102 HCHG RX REV CODE 250 W/ 637 OVERRIDE(OP): Performed by: HOSPITALIST

## 2019-07-24 PROCEDURE — A9270 NON-COVERED ITEM OR SERVICE: HCPCS | Performed by: FAMILY MEDICINE

## 2019-07-24 PROCEDURE — A9270 NON-COVERED ITEM OR SERVICE: HCPCS | Performed by: INTERNAL MEDICINE

## 2019-07-24 PROCEDURE — 99232 SBSQ HOSP IP/OBS MODERATE 35: CPT | Performed by: HOSPITALIST

## 2019-07-24 PROCEDURE — 700102 HCHG RX REV CODE 250 W/ 637 OVERRIDE(OP): Performed by: INTERNAL MEDICINE

## 2019-07-24 PROCEDURE — A9270 NON-COVERED ITEM OR SERVICE: HCPCS | Performed by: HOSPITALIST

## 2019-07-24 PROCEDURE — 770001 HCHG ROOM/CARE - MED/SURG/GYN PRIV*

## 2019-07-24 PROCEDURE — 99233 SBSQ HOSP IP/OBS HIGH 50: CPT | Performed by: INTERNAL MEDICINE

## 2019-07-24 PROCEDURE — 85025 COMPLETE CBC W/AUTO DIFF WBC: CPT

## 2019-07-24 PROCEDURE — 97116 GAIT TRAINING THERAPY: CPT

## 2019-07-24 PROCEDURE — 700111 HCHG RX REV CODE 636 W/ 250 OVERRIDE (IP): Performed by: HOSPITALIST

## 2019-07-24 PROCEDURE — 700101 HCHG RX REV CODE 250: Performed by: FAMILY MEDICINE

## 2019-07-24 PROCEDURE — 97530 THERAPEUTIC ACTIVITIES: CPT

## 2019-07-24 RX ORDER — LEVOFLOXACIN 750 MG/1
750 TABLET, FILM COATED ORAL DAILY
Status: DISCONTINUED | OUTPATIENT
Start: 2019-07-24 | End: 2019-08-08

## 2019-07-24 RX ADMIN — FERROUS SULFATE TAB 325 MG (65 MG ELEMENTAL FE) 325 MG: 325 (65 FE) TAB at 08:26

## 2019-07-24 RX ADMIN — TEMAZEPAM 15 MG: 15 CAPSULE ORAL at 20:06

## 2019-07-24 RX ADMIN — PYRIDOXINE HCL TAB 50 MG 100 MG: 50 TAB at 06:00

## 2019-07-24 RX ADMIN — PRAMIPEXOLE DIHYDROCHLORIDE 0.25 MG: 0.5 TABLET ORAL at 18:31

## 2019-07-24 RX ADMIN — GABAPENTIN 400 MG: 400 CAPSULE ORAL at 11:24

## 2019-07-24 RX ADMIN — Medication 1 CAPSULE: at 08:26

## 2019-07-24 RX ADMIN — CINACALCET HYDROCHLORIDE 60 MG: 30 TABLET, COATED ORAL at 04:33

## 2019-07-24 RX ADMIN — OXYCODONE HYDROCHLORIDE 5 MG: 5 TABLET ORAL at 15:10

## 2019-07-24 RX ADMIN — LIDOCAINE 2 PATCH: 50 PATCH CUTANEOUS at 11:25

## 2019-07-24 RX ADMIN — PYRAZINAMIDE 1000 MG: 500 TABLET ORAL at 04:32

## 2019-07-24 RX ADMIN — ATORVASTATIN CALCIUM 10 MG: 10 TABLET, FILM COATED ORAL at 17:31

## 2019-07-24 RX ADMIN — OXYCODONE HYDROCHLORIDE 10 MG: 10 TABLET, FILM COATED, EXTENDED RELEASE ORAL at 04:32

## 2019-07-24 RX ADMIN — OMEPRAZOLE 20 MG: 20 CAPSULE, DELAYED RELEASE ORAL at 04:35

## 2019-07-24 RX ADMIN — RIFAMPIN 600 MG: 300 CAPSULE ORAL at 04:33

## 2019-07-24 RX ADMIN — SENNOSIDES, DOCUSATE SODIUM 2 TABLET: 50; 8.6 TABLET, FILM COATED ORAL at 04:35

## 2019-07-24 RX ADMIN — LOSARTAN POTASSIUM 75 MG: 50 TABLET ORAL at 04:33

## 2019-07-24 RX ADMIN — PRAMIPEXOLE DIHYDROCHLORIDE 0.25 MG: 0.5 TABLET ORAL at 04:34

## 2019-07-24 RX ADMIN — METOPROLOL TARTRATE 25 MG: 25 TABLET, FILM COATED ORAL at 17:31

## 2019-07-24 RX ADMIN — GABAPENTIN 400 MG: 400 CAPSULE ORAL at 04:34

## 2019-07-24 RX ADMIN — SENNOSIDES, DOCUSATE SODIUM 2 TABLET: 50; 8.6 TABLET, FILM COATED ORAL at 17:31

## 2019-07-24 RX ADMIN — SUCRALFATE 1 G: 1 SUSPENSION ORAL at 01:17

## 2019-07-24 RX ADMIN — SUCRALFATE 1 G: 1 SUSPENSION ORAL at 04:32

## 2019-07-24 RX ADMIN — AMLODIPINE BESYLATE 10 MG: 5 TABLET ORAL at 04:34

## 2019-07-24 RX ADMIN — GABAPENTIN 400 MG: 400 CAPSULE ORAL at 17:32

## 2019-07-24 RX ADMIN — LEVOFLOXACIN 750 MG: 750 TABLET, FILM COATED ORAL at 15:11

## 2019-07-24 RX ADMIN — HEPARIN SODIUM 5000 UNITS: 5000 INJECTION, SOLUTION INTRAVENOUS; SUBCUTANEOUS at 04:35

## 2019-07-24 RX ADMIN — LEVETIRACETAM 500 MG: 500 TABLET, FILM COATED ORAL at 06:00

## 2019-07-24 RX ADMIN — PRAMIPEXOLE DIHYDROCHLORIDE 0.25 MG: 0.5 TABLET ORAL at 11:25

## 2019-07-24 RX ADMIN — POTASSIUM CHLORIDE 20 MEQ: 20 TABLET, EXTENDED RELEASE ORAL at 04:32

## 2019-07-24 RX ADMIN — LEVETIRACETAM 500 MG: 500 TABLET, FILM COATED ORAL at 17:31

## 2019-07-24 RX ADMIN — ACETAMINOPHEN 500 MG: 500 TABLET ORAL at 20:07

## 2019-07-24 RX ADMIN — OXYCODONE HYDROCHLORIDE 5 MG: 5 TABLET ORAL at 22:18

## 2019-07-24 RX ADMIN — SUCRALFATE 1 G: 1 SUSPENSION ORAL at 11:25

## 2019-07-24 RX ADMIN — HEPARIN SODIUM 5000 UNITS: 5000 INJECTION, SOLUTION INTRAVENOUS; SUBCUTANEOUS at 17:31

## 2019-07-24 RX ADMIN — ETHAMBUTOL HYDROCHLORIDE 800 MG: 400 TABLET, FILM COATED ORAL at 04:39

## 2019-07-24 RX ADMIN — METOPROLOL TARTRATE 25 MG: 25 TABLET, FILM COATED ORAL at 06:00

## 2019-07-24 RX ADMIN — SUCRALFATE 1 G: 1 SUSPENSION ORAL at 17:31

## 2019-07-24 RX ADMIN — ISONIAZID 300 MG: 300 TABLET ORAL at 06:00

## 2019-07-24 RX ADMIN — OXYCODONE HYDROCHLORIDE 10 MG: 10 TABLET, FILM COATED, EXTENDED RELEASE ORAL at 17:31

## 2019-07-24 ASSESSMENT — ENCOUNTER SYMPTOMS
BLURRED VISION: 0
WEAKNESS: 0
FEVER: 0
HEARTBURN: 0
DIARRHEA: 0
CHILLS: 0
NAUSEA: 0
FOCAL WEAKNESS: 0
SHORTNESS OF BREATH: 0
SPEECH CHANGE: 0
COUGH: 0
SENSORY CHANGE: 0
HEADACHES: 0
WHEEZING: 0
ABDOMINAL PAIN: 0
NECK PAIN: 0
NERVOUS/ANXIOUS: 0
PALPITATIONS: 0
DIZZINESS: 0
VOMITING: 0
SORE THROAT: 0
BACK PAIN: 0

## 2019-07-24 ASSESSMENT — COGNITIVE AND FUNCTIONAL STATUS - GENERAL
STANDING UP FROM CHAIR USING ARMS: A LITTLE
CLIMB 3 TO 5 STEPS WITH RAILING: A LOT
SUGGESTED CMS G CODE MODIFIER MOBILITY: CK
MOBILITY SCORE: 17
MOVING TO AND FROM BED TO CHAIR: A LITTLE
TURNING FROM BACK TO SIDE WHILE IN FLAT BAD: A LITTLE
MOVING FROM LYING ON BACK TO SITTING ON SIDE OF FLAT BED: A LITTLE
WALKING IN HOSPITAL ROOM: A LITTLE

## 2019-07-24 ASSESSMENT — GAIT ASSESSMENTS
ASSISTIVE DEVICE: FRONT WHEEL WALKER
GAIT LEVEL OF ASSIST: MINIMAL ASSIST
DISTANCE (FEET): 75

## 2019-07-24 NOTE — PROGRESS NOTES
Infectious Disease Progress Note    Author: Beatriz Johnson M.D. Date & Time of service: 7/23/2019  5:01 PM    Chief Complaint:  Brain abscess, gluteal abscess  Vertebral OM     Interval History:  68 y.o. female admitted 5/29/2019 as a transfer from [facility] since 4/30/2019. + diabetes, SANTOSH of right hip with hardware, and breast cancer who was originally admitted to [facility] on 03/08/2019 for right hip and pelvic pain.  Work-up revealed a right iliopsoas lesion, gluteal abscess, and mult brain abscesses     7/4 AF much more alert and conversant today-c/o left hip pain, unrelenting and would like parietal dressing changed. HA at surgical site.  Denies SE abx. No new neurodeficits  7/5 afebrile WBC 5.4.  Patient sleeping but arousable.  She denies any headache, nausea, vomiting.  7/6 afebrile WBC 6.5.  Patient sitting up in chair and having excruciating back pain  7/8 Pt has no specific complaints, she is mildly confused today.  She seems to be tolerating her medications with no significant lab abnormalities.  7/9 AF, O2 RA, Significant left hip pain today.  She does appear to be tolerating her antibiotics.  Ultrasound of bilateral lower extremities has been ordered.  7/10 AF, O2 RA,  Plan for IR drainage of sacral collection soon.   7/11 AF, O2 RA,  Pt continued on RIPE and ampho.  She is tolerating well overall, requiring some mag and potassium replacement.  She is complaining of severe pain in left hip again today.  Plan for biopsy later today.   7/12  IR biopsy of R gluteal abscess/hip. AF, O2 2 L NC,   tolerating antibiotics so far.  She is quite somnolent today but per nursing she was oriented x4 earlier  7/14 AF, O2 RA, more alert today, conversant and asking for advance in diet. Left hip pain ongoing but improved.   7/15 afebrile WBC 4.4.  Patient's speech is somewhat muffled but she is alert and responding to questions appropriately.  MRI shows worsening lesions and amphotericin stopped.  Nurse reports  waxing and waning mental status   afebrile.  Patient sleeping but arousable.  She is answer questions appropriately but then forgets that she is in the hospital.   afebrile WBC 4.9 patient is very drowsy as she states she did not sleep well yesterday.  She is asking about whether or not she has a fungal infection.  Plan of care discussed with patient.   afebrile patient continues to only endorse left hip pain exacerbated by sitting up in the chair.  She sat in the chair for 30 minutes yesterday.  Getting out of bed is limited secondary to left hip pain.  Mental status remains about the same with intermittent waxing and waning.  No new issues today per RN   afebrile patient is much more awake and alert today.  She is answering questions appropriately.  Yesterday she was complaining of some abdominal pain so CT scan was done and revealed interval enlargement of a hyperdense ovoid masslike structure posterior to the right ilium concerning for pseudoaneurysm.  2019 no fevers.  No new issues overnight.  Had her ultrasound today follow the results  2019-no fevers.  Ongoing pain.  The AFB cultures have come back positive for TB  Review of Systems:  Review of Systems   Constitutional: Positive for malaise/fatigue. Negative for chills and fever.   Respiratory: Negative for shortness of breath.    Gastrointestinal: Negative for abdominal pain, nausea and vomiting.   Musculoskeletal: Positive for joint pain.        Left hip   Neurological: Negative for dizziness and headaches.       Hemodynamics:  Temp (24hrs), Av.2 °C (97.1 °F), Min:36.1 °C (97 °F), Max:36.2 °C (97.2 °F)  Temperature: 36.2 °C (97.2 °F)  Pulse  Av.4  Min: 49  Max: 195   Blood Pressure : 129/69, NIBP: 150/78       Physical Exam:  Physical Exam   Constitutional:   Elderly  Chronically ill-appearing   HENT:   Mouth/Throat: Oropharynx is clear and moist. No oropharyngeal exudate.   Right parietal surgical site. Well approximated,  no drainage or surrounding erythema.     Eyes: Pupils are equal, round, and reactive to light. Conjunctivae and EOM are normal.   Neck: Neck supple.   Cardiovascular: Normal rate and regular rhythm.    Pulmonary/Chest: Effort normal. She has no wheezes. She has no rales.   Abdominal: Soft. There is no tenderness.   Musculoskeletal: She exhibits no edema.   Right upper extremity PICC line-nontender, no surrounding erythema   Neurological: She is alert.   Skin: She is not diaphoretic.       Meds:    Current Facility-Administered Medications:   •  acetaminophen  •  hyoscyamine-maalox plus-lidocaine viscous  •  riFAMPin  •  thrombin  •  oxyCODONE immediate-release  •  oxyCODONE CR  •  temazepam  •  potassium chloride SA  •  losartan  •  pramipexole  •  senna-docusate  •  polyethylene glycol/lytes  •  magnesium hydroxide  •  gabapentin  •  ferrous sulfate  •  diphenhydrAMINE  •  heparin  •  pyridoxine  •  levETIRAcetam  •  isoniazid  •  ethambutol  •  pyrazinamide  •  Pharmacy Consult Request  •  labetalol  •  hydrALAZINE  •  lactobacillus rhamnosus  •  sucralfate  •  lidocaine  •  cyclobenzaprine  •  Respiratory Care per Protocol  •  amLODIPine  •  cinacalcet  •  atorvastatin  •  omeprazole  •  metoprolol  •  ondansetron  •  ondansetron    Labs:  Recent Labs      07/22/19   0330  07/23/19   0553   WBC  5.8  4.5*   RBC  3.84*  3.60*   HEMOGLOBIN  11.3*  10.9*   HEMATOCRIT  36.3*  33.8*   MCV  94.5  93.9   MCH  29.4  30.3   RDW  55.6*  53.9*   PLATELETCT  258  235   MPV  9.2  9.1   NEUTSPOLYS  60.90  56.20   LYMPHOCYTES  20.70*  26.10   MONOCYTES  12.00  11.70   EOSINOPHILS  5.20  5.10   BASOPHILS  0.90  0.70     Recent Labs      07/22/19   0330   SODIUM  136   POTASSIUM  3.7   CHLORIDE  103   CO2  24   GLUCOSE  106*   BUN  13     Recent Labs      07/22/19   0330   CREATININE  0.62       Imaging:  MRI on 7/14/2019  Impression:       1.  Innumerable nodular enhancing lesions throughout the brain parenchyma both the supra  and infratentorial compartments predominantly near the gray-white junction. There has been interval increase in size of several of these lesions since previous exam.   These changes may be secondary to metastatic disease or granulomatous disease.    2.  Interval right parietal craniotomy with underlying elliptical postsurgical defect.    3.  Interval increase in size of index right thalamic lesion which has increased vasogenic edema since previous exam.    4.  Age-related cerebral atrophy.       Micro:  Results     Procedure Component Value Units Date/Time    Fungal Culture [[id number]] Collected:  06/28/19 1403    Order Status:  Completed Specimen:  Tissue Updated:  07/23/19 0848     Significant Indicator NEG     Source TISS     Site Right Parietal Lesion     Culture Result No fungal growth.    Narrative:       Surgery Specimen    Anaerobic Culture [[id number]] Collected:  06/28/19 1403    Order Status:  Completed Specimen:  Tissue Updated:  07/23/19 0848     Significant Indicator NEG     Source TISS     Site Right Parietal Lesion     Culture Result No Anaerobes isolated.    Narrative:       Surgery Specimen    CULTURE TISSUE W/ GRM STAIN [[id number]] Collected:  06/28/19 1403    Order Status:  Completed Specimen:  Tissue Updated:  07/23/19 0848     Significant Indicator NEG     Source TISS     Site Right Parietal Lesion     Culture Result No growth at 72 hours.     Gram Stain Result Rare WBCs.  No organisms seen.      Narrative:       Surgery Specimen    AFB Culture [[id number]] Collected:  06/28/19 1403    Order Status:  Completed Specimen:  Tissue Updated:  07/23/19 0848     Significant Indicator NEG     Source TISS     Site Right Parietal Lesion     Culture Result Culture in progress.     AFB Smear Results No acid fast bacilli seen.    Narrative:       Surgery Specimen    CULTURE TISSUE W/ GRM STAIN [[id number]] Collected:  07/11/19 1745    Order Status:  Completed Specimen:  Tissue Updated:  07/23/19 0712      Significant Indicator NEG     Source TISS     Site Right Hip Cores     Culture Result No growth at 11 days.     Gram Stain Result No organisms seen.    Narrative:       Radiology specimen Hold specimen 14 days per Dr. Junior    Anaerobic Culture [[id number]] Collected:  07/11/19 1745    Order Status:  Completed Specimen:  Tissue Updated:  07/23/19 0747     Significant Indicator NEG     Source TISS     Site Right Hip Cores     Culture Result Culture in progress.    Narrative:       Radiology specimen Hold specimen 14 days per Dr. Junior    Fungal Culture [[id number]] Collected:  06/19/19 0930    Order Status:  Completed Specimen:  Tissue Updated:  07/22/19 1949     Significant Indicator NEG     Source TISS     Site Right Hip deep bone     Culture Result No fungal growth.    Narrative:       CALL  [facility]  TEREZA tel. [phone],  Collected By:[id number] BEATRICE HARRIS  Bone biopsy right hip  Collected By:[id number] BEATRICE HARRIS  Right hip fluid collection  Collected By:[id number] BEATRICE HARRIS    AFB Culture [[id number]]  (Abnormal) Collected:  06/19/19 0930    Order Status:  Completed Specimen:  Tissue from Other Body Fluid Updated:  07/22/19 1949     Significant Indicator POS (POS)     Source TISS     Site Right Hip deep bone     Culture Result Acid fast bacilli detected by MGIT 960 instrument.  Identification to follow.   (A)     AFB Smear Results No acid fast bacilli seen.     Culture Result Mycobacterium tuberculosis complex  By DNA probe  Positive for M. tb complex  Negative for M. avium complex  Negative for M. gordonae  Negative for M. kansasii  Testing performed at:  [facility]  [address] [place] [id number] [phone]   (A)    Narrative:       CALL  [facility]  TEREZA tel. [phone],  Collected By:[id number] BEATRICE HARRIS  Bone biopsy right hip  Collected By:[id number] BEATRICE HARRIS  Right hip fluid collection  Collected By:[id number] BEATRICE HARRIS    Fungal Smear [[id number]] Collected:   "06/19/19 0930    Order Status:  Completed Specimen:  Tissue from Other Body Fluid Updated:  07/22/19 1949     Significant Indicator NEG     Source TISS     Site Right Hip deep bone     Fungal Smear Results No fungal elements seen.    Narrative:       CALL  [facility]  TEREZA tel. [phone],  Collected By:[id number] BEATRICE WINSTON D  Bone biopsy right hip  Collected By:[id number] BEATRICE WINSTON D  Right hip fluid collection  Collected By:[id number] BEATRICE WINSTON D    CULTURE TISSUE W/ GRM STAIN [[id number]] Collected:  06/19/19 0930    Order Status:  Completed Specimen:  Tissue Updated:  07/22/19 1949     Significant Indicator NEG     Source TISS     Site Right Hip deep bone     Culture Result No growth at 72 hours.     Gram Stain Result No organisms seen.    Narrative:       CALL  [facility]  TEREZA tel. [phone],  Collected By:[id number] BEATRICE WINSTON D  Bone biopsy right hip  Collected By:[id number] BEATRICE WINSTON D  Right hip fluid collection  Collected By:[id number] BEATRICE WINSTON D          Assessment:  Active Hospital Problems    Diagnosis   • *Brain lesion [G93.9]   • Granulomatous disease  [L92.9]   • Abscess of right iliac muscle and right gluteus medius muscle [L02.91]   • Pseudoaneurysm following procedure (HCC) [I97.89, I72.9]   • Sepsis (HCC) [A41.9]   • History of breast cancer [Z85.3]       Assessment/Plan:  Encephalopathy, intermittent waxing and waning     Brain lesions, worse on MRI  Tubercular  MRI 4/23 \"supra and infratentorial brain parenchyma. Decrease in the size of the lesions. Interval reduction in the extent of the surrounding white matter edema consistent with improvement/treatment response of the most of the multifocal brain abscesses.  MRI 5/21 showed innumerable microabscesses vs mets  Abx (vancomycin, cefepime and Flagyl) discontinued on 5/23 after ~8 weeks of treatment-developed new fevers on 6/4  MRI on 6/8 with interval progression of supra and infratentorial intra--axial nodules and associated edema.  New right thalamus lesion. " Radiology favors infection over neoplasm though both remain considerations (had been off abx)  Mult blood cultures neg  CHAS neg 3/15 and 5/24  MRI 6/22 worsening CNS lesions  S/p right craniotomy and resection of mass with biopsy on 6/28. Biopsy-per note fungal appearing elements seen per Neurosurgery-discussed with pathologist who examined the frozen section and there were no fungal appearing elements.   +necrotizing granulomas. All stains negative in EPIC  Fungal culture- negative to date  Bacterial cultures-negative to date  AFB culture-in progress  Pathology- focal necrotizing granulomatous inflammation; no fungal elements or acid-fast bacilli on stains.  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 but we have no confirmed tissue diagnosis  Continue pyridoxine (B6) while on isoniazid  Will need occasional testing AST/ALT, both so far within normal limits  Follow-up brain biopsy specimen sent to [facility] for PCR testing, bacterial, fungal, AFB   Follow-up serologic testing sent out to [facility] [place]  Brucella ab - negative  Coxiella ab - negative   Histo ab serum & antigen urine-negative  Blasto ab - negative      Discussed MRI findings with radiologist and there is easily obtained tissue/fluid in the sacrum and area that has been worsening despite therapy, per radiology images most consistent to TB or Brucellosis  -Pelvic biopsy on 7/11 - fluid drainage and specimen sent to [facility] again for AFB, bacterial and fungal, crypto and cocci PCR-confirmed with Alex this was sent out on 7/12   Pathology  - No definite acid fast or fungal organisms are  "seen.     Gluteal and iliopsoas abscess   OM L5, S1-3, new this admission/skeletal TB  Recurrent abscesses  Adjacent to hardware in right hip (placed 12/17/18)  CT 4/23 \"Fluid collections within the right gluteal and iliacis muscles.. The collection within the right gluteal muscle has unchanged while the fluid collection within the right iliacis muscle is increased somewhat in size.\" 2.7 cm  Cultures 3/29 and 4/4 neg  MRI on 5/20/2019 - interval decrease in collection in R gluteal muscle and persistent multiloculated collection in R iliacus muscle.   CT 5/28 no change  s/p drainage on 5/30/2019-cultures negative  S/p removal hardware 6/5-no cultures done  1, 3 beta glucan neg  CT on 6/8 with residual abscess in the right iliacus muscle, 2.5 x 1.8 cm, slightly smaller than prior  s/p CT-guided deep bone biopsy 6/19/2019.  Cultures remain negative; path not reported   MRI 6/28 chronic discitis, diffuse OM L5, 3 and 4 cm abscesses right glute, 1.5 cm abscess or necrosis right posterior ileum, 3.3 cm abscess right SI joint  Continue TB therapy  I have left message with [facility].  We will discuss with them the need for steroids.  Also we will discuss with them of Zoloft butabarbital in cases of CNS TB     Right gluteal pseudoaneurysm  significant interval enlargement noted by IR during procedure on 7/11  -CTA planned      +QuantiGold  Query disseminated TB  Path with necrotizing granulomas  AFB stain neg  AFB cultures pending  All prior AFB cxs still neg  Started RIPE +B6 7/3  Monitor for visual changes while on ethambutol.  None currently.   Ophthalmology evaluation for baseline vision as well as color vision testing when able     Type 2 DM  Hemoglobin A1c 6.3   Keep BS under 150 to help control current infection      Discussed with IM RN  "

## 2019-07-24 NOTE — THERAPY
"Physical Therapy Treatment completed.   Bed Mobility:  Supine to Sit: Supervised (HOB flat and use of railing)  Transfers: Sit to Stand: Minimal Assist (from EOB->FWW)  Gait: Level Of Assist: Minimal Assist with Front-Wheel Walker       Plan of Care: Will benefit from Physical Therapy 3 times per week  Discharge Recommendations: Equipment: Will Continue to Assess for Equipment Needs. Post-acute therapy Discharge to a transitional care facility for continued skilled therapy services.     See \"Rehab Therapy-Acute\" Patient Summary Report for complete documentation.       "

## 2019-07-24 NOTE — CARE PLAN
Problem: Safety  Goal: Will remain free from injury  Bed alarms Intact call bell being used appropriatly, assiting patient with ambulation

## 2019-07-24 NOTE — CARE PLAN
Problem: Communication  Goal: The ability to communicate needs accurately and effectively will improve  Patient confused at time continuing to reassess needs hourly rounding in place

## 2019-07-24 NOTE — PROGRESS NOTES
[facility] Medicine Daily Progress Note    Date of Service  7/24/2019    Chief Complaint  68 y.o. female admitted 5/29/2019 with right iliac and gluteus abscess    Hospital Course  Patient is a 68 year old with history of breast cancer, copd, dm, gerd, dl and htn who was admitted with right iliac gluteus abscess.  She also has a known history of brain lesions with increasing enhancement noted on MRI and possible chronic osteomyelitis of the lumbar spine.    Interval Problem Update  Axox3  Reports hip pain 4/10  No sob  No dizziness  Ros otherwise negative    Consultants/Specialty  ID  Neurosurgery    Code Status  Full    Disposition  TBD    Review of Systems  Review of Systems   Constitutional: Negative for chills, fever and malaise/fatigue.   HENT: Negative for hearing loss and sore throat.    Eyes: Negative for blurred vision.   Respiratory: Negative for cough, shortness of breath and wheezing.    Cardiovascular: Negative for chest pain, palpitations and leg swelling.   Gastrointestinal: Negative for abdominal pain, diarrhea, heartburn, nausea and vomiting.   Genitourinary: Negative for dysuria.   Musculoskeletal: Negative for back pain, joint pain and neck pain.   Skin: Negative for rash.   Neurological: Negative for dizziness, sensory change, speech change, focal weakness, weakness and headaches.   Psychiatric/Behavioral: The patient is not nervous/anxious.         Physical Exam  Temp:  [36 °C (96.8 °F)-36.5 °C (97.7 °F)] 36.1 °C (97 °F)  Pulse:  [72-85] 72  Resp:  [17-18] 18  BP: (129-150)/(63-72) 150/68  SpO2:  [92 %-99 %] 99 %    Physical Exam   Constitutional: She appears well-developed and well-nourished. No distress.   HENT:   Head: Normocephalic and atraumatic.   Mouth/Throat: Oropharynx is clear and moist.   Surgical site cdi   Eyes: Pupils are equal, round, and reactive to light. Conjunctivae are normal.   Neck: Normal range of motion. Neck supple.   Cardiovascular: Normal rate, regular rhythm, normal  heart sounds and intact distal pulses.  Exam reveals no gallop and no friction rub.    No murmur heard.  Pulmonary/Chest: Effort normal and breath sounds normal. No respiratory distress. She has no wheezes. She has no rales. She exhibits no tenderness.   Abdominal: Soft. Bowel sounds are normal. She exhibits no distension and no mass. There is no tenderness. There is no rebound and no guarding.   Musculoskeletal: Normal range of motion. She exhibits no edema or tenderness.   Neurological: She is alert. She has normal reflexes. No cranial nerve deficit. She exhibits normal muscle tone. Coordination normal.   Skin: Skin is warm and dry. No rash noted. She is not diaphoretic. No erythema. No pallor.   Nursing note and vitals reviewed.      Fluids    Intake/Output Summary (Last 24 hours) at 07/24/19 1251  Last data filed at 07/24/19 0836   Gross per 24 hour   Intake              120 ml   Output                0 ml   Net              120 ml       Laboratory  Recent Labs      07/22/19   0330  07/23/19   0553  07/24/19   0344   WBC  5.8  4.5*  4.4*   RBC  3.84*  3.60*  3.42*   HEMOGLOBIN  11.3*  10.9*  9.9*   HEMATOCRIT  36.3*  33.8*  32.3*   MCV  94.5  93.9  94.4   MCH  29.4  30.3  28.9   MCHC  31.1*  32.2*  30.7*   RDW  55.6*  53.9*  54.6*   PLATELETCT  258  235  236   MPV  9.2  9.1  8.9*     Recent Labs      07/22/19   0330   SODIUM  136   POTASSIUM  3.7   CHLORIDE  103   CO2  24   GLUCOSE  106*   BUN  13   CREATININE  0.62   CALCIUM  10.0                   Imaging  US-EXTREMITY ARTERY LOWER UNILAT RIGHT   Final Result      Right gluteal pseudoaneurysm measuring 3.65 x 2.24 x 2.31 cm.      IR-INJ-EXT PSEUDOANEURYSM PERC   Final Result      1.  Ultrasound guided thrombin injection for treatment of a RIGHT gluteal 3.6 cm pseudoaneurysm.      CTA ABDOMEN PELVIS W & W/O POST PROCESS   Final Result      1.  Interval enlargement of a hyperdense ovoid masslike structure immediately posterior to the right ilium, possibly  representing a pseudoaneurysm.   2.  Stable thin-walled apparent fluid-filled structure immediately deep to the right ilium.   3.   Minimal retroperitoneal adenopathy, grossly stable.   4.  Cholelithiasis.      MR-BRAIN-WITH & W/O   Final Result      1.  Innumerable nodular enhancing lesions throughout the brain parenchyma both the supra and infratentorial compartments predominantly near the gray-white junction. There has been interval increase in size of several of these lesions since previous exam.    These changes may be secondary to metastatic disease or granulomatous disease.      2.  Interval right parietal craniotomy with underlying elliptical postsurgical defect.      3.  Interval increase in size of index right thalamic lesion which has increased vasogenic edema since previous exam.      4.  Age-related cerebral atrophy.      IR-PICC LINE PLACEMENT W/ GUIDANCE > AGE 5   Final Result                  Ultrasound-guided PICC placement performed by qualified nursing staff as    above.          CT-NEEDLE BX-ABD-RETROPERITONL   Final Result      1.  CT GUIDED biopsy of a right pelvic phlegmon.      2. Significant interval enlargement of a right gluteal pseudoaneurysm. CTA and consideration for possible intervention either with direct thrombin injection or endovascular treatment was suggested. This was conveyed to Dr. Alatorre on 7/11/2019 via [employer]    text at 1750 hours.      IG-JCYSEZN-3 VIEW   Final Result         1.  Moderate stool in the colon suggests changes of constipation, otherwise nonspecific bowel gas pattern   2.  Atherosclerosis      US-EXTREMITY VENOUS LOWER BILAT   Final Result      IR-US GUIDED PIV   Final Result    Ultrasound-guided PERIPHERAL IV INSERTION performed by    qualified nursing staff as above.            MR-LUMBAR SPINE-WITH & W/O   Final Result         1.  Grade 2-3 spondylolisthesis at the L5-S1 level with bilateral spondylolysis of the pars interarticularis. This causes severe  bilateral neural foraminal stenosis at this level.      2.  Interval removal of posterior fusion hardware at the lumbosacral junction.      3.  Diffuse osteomyelitis involving the L5 vertebral body and the first 3 sacral segments.      4.  Chronic discitis at the L5-S1 level with anterior paraspinous abscess at this level and anterior to the S1 sacral segment.      5.  Smaller intraosseous bone bone abscesses or areas of necrosis noted in the sacrum.      6.  There is also evidence of osteomyelitis involving the jose antonio and sacrum bilaterally along the course of the previous sacral fixation screw. There is a large 4 cm abscess noted in the right gluteal soft tissues in a smaller 3 cm chronic appearing    abscess in the left gluteal musculature.      7.  There is a 1.5 cm intraosseous abscess or area of necrosis in the right posterior ilium.      8.  There is a 3.3 cm centimeters multiloculated abscess anterior to the right sacroiliac joint.      MR-STEALTH BRAIN WITH & W/O   Final Result         1.  Innumerable subcentimeter brain metastases, many at the gray-white junction but also multiple noted throughout the basal ganglia and brainstem.      2.  Largest index lesion is noted in the right thalamus and has increased in size since previous exam and currently measures 9 mm and has a moderate amount of surrounding vasogenic edema.      US-EXTREMITY NON VASCULAR UNILATERAL RIGHT   Final Result      No right hip joint effusion. No soft tissue fluid collection.      MR-BRAIN-WITH & W/O   Final Result      1.  Innumerable supra and infratentorial enhancing nodules are again noted throughout the brain parenchyma with multiple lesions appearing somewhat larger and with increased enhancement. No associated diffusion restriction. Unchanged differential    considerations including bacterial or fungal infection versus metastatic disease.   2.  Mild diffuse cerebral substance loss.   3.  Mild microangiopathic ischemic change  versus demyelination or gliosis.      CT-NEEDLE BX-DEEP BONE   Final Result      CT GUIDED RIGHT ILIUM DEEP BONE BIOPSY. SAMPLES WERE SENT TO MICROBIOLOGY FOR ANALYSIS.      CT-PELVIS WITH   Final Result         1.  No significant interval change in the size of the RIGHT iliac muscle fluid collections   2.  Hyperdense RIGHT gluteal lesion moderately suspicious for pseudoaneurysm with adjacent hematoma.   3.  Changes in the RIGHT iliac bone, BILATERAL sacrum and LEFT iliac bone suspicious for osteomyelitis   4.  Changes at L5-S1 suspicious for discitis osteomyelitis      CT-PELVIS WITH   Final Result      1.  Residual or recurrent abscess within the right iliacus muscle measuring 2.5 x 1.8 cm. It slightly smaller than on 5/28/2019      2.  Interval removal of hardware from the iliac bones and sacrum      3.  Lytic change of the right iliac bone and the sacrum. This could be related to hardware versus osteomyelitis.      4.  Subacute fractures of the right ilium, sacrum, and there is lucency within the left iliac bone that is likely from hardware      MR-BRAIN-WITH & W/O   Final Result      1.  Interval progression of supra and infratentorial intra-axial nodules and associated edema. This short-term interval progression favors infection over neoplasm though both remain considerations.   2.  Mild atrophy      DX-PORTABLE FLUOROSCOPY < 1 HOUR   Final Result         Portable fluoroscopy utilized for 2 minutes.      DX-PELVIS-1 OR 2 VIEWS   Final Result      Status post hardware removal from the right SI joint      DX-CHEST-PORTABLE (1 VIEW)   Final Result      Interstitial prominence could be due to pulmonary edema or hypoventilatory change.      DX-CHEST-LIMITED (1 VIEW)   Final Result      LEFT basilar underinflation atelectasis or pneumonia      CT-DRAIN-HEMATOMA - SEROMA   Final Result      CT-guided RIGHT pelvic fluid collection aspiration, laboratory evaluation pending              Assessment/Plan  * Brain lesion-  (present on admission)   Assessment & Plan    Right-sided craniotomy for resection of superficial mass  6/28/2019  Continue Isoniazid, Rifampin, Ethambutol, Pyrazinamide per ID, discussed with ID they have reached out to [facility] to discuss outpatient regimen  Continue Keppra for seizure prophylaxis     Granulomatous disease - (present on admission)   Assessment & Plan    Isoniazid, Rifampin, Ethambutol, Pyrazinamide     Sepsis (HCC)- (present on admission)   Assessment & Plan    This is sepsis (without associated acute organ dysfunction).    Source - Right Iliac and Gluteus medius abscess     Abscess of right iliac muscle and right gluteus medius muscle- (present on admission)   Assessment & Plan    CT guided pelvic aspiration 5/30/2019  Hardware removal, pelvis 6/5/2019  CT guided deep bone biopsy 6/19/2019  CT guided aspiration/biopsy of a R gluteal fluid collection/phlegmon 7/11/2019 - consistent with TB  Isoniazid, Rifampin, Ethambutol, Pyrazinamide       Hypokalemia- (present on admission)   Assessment & Plan    Will recheck     Osteomyelitis (HCC)- (present on admission)   Assessment & Plan    Query  ID following     Pseudoaneurysm following procedure (HCC)- (present on admission)   Assessment & Plan      POD #2 s/p IR thrombin injection for thrombosed psedudoanerysm     Hypomagnesemia- (present on admission)   Assessment & Plan    Oral magnesium     Dysphagia- (present on admission)   Assessment & Plan    Dysphagia 3  Speech following     Essential hypertension- (present on admission)   Assessment & Plan    Continue Metoprolol, Losartan and Amlodipine as tolerated     COPD (chronic obstructive pulmonary disease) (HCC)- (present on admission)   Assessment & Plan    RT protocol  No s/o exacerbation     Non-severe protein-calorie malnutrition (HCC)- (present on admission)   Assessment & Plan    Nutrition consult       History of DVT (deep vein thrombosis)- (present on admission)   Assessment & Plan     Hold Xarelto     RLS (restless legs syndrome)- (present on admission)   Assessment & Plan    Pramipexole     Chronic pain- (present on admission)   Assessment & Plan      Suspect opiates contributing to confusion - improving with titration  Continue supportive care       History of breast cancer- (present on admission)   Assessment & Plan    history left mastectomy and breast implant.            VTE prophylaxis: Heparin

## 2019-07-24 NOTE — CARE PLAN
Problem: Safety  Goal: Will remain free from injury  Outcome: PROGRESSING AS EXPECTED  Bed low and locked, alarm set, call bell within reach.  Hourly rounding continues      Problem: Mobility  Goal: Risk for activity intolerance will decrease  Outcome: PROGRESSING AS EXPECTED  Pt encouraged to get oob to bathroom vs using bed pan, and she is encouraged to get up to chair for meals.

## 2019-07-24 NOTE — PROGRESS NOTES
Assumed care of pt at 0700.  Pt alert and oriented to person, place, and somewhat of situation/event.  She is disoriented to date/time.  Denies any pain or discomfort.  Her appetite is not great; she needs encouragement to eat, but is tolerating the dysphagia II diet well.  Pt encouraged to get oob to meals.  She can be incontinent of urine at times.  PICC in place.  SCDs applied to BLE.  Bed low and locked, alarm set and call bell within reach.  Hourly rounding continues.    1130: pt does NOT like the SCDs; she already has RLS, and now screaming in pain from them squeezing.  Updated Dr. Sorensen and she gave verbal order to DC since pt on Heparin SQ

## 2019-07-25 LAB
BASOPHILS # BLD AUTO: 1 % (ref 0–1.8)
BASOPHILS # BLD: 0.04 K/UL (ref 0–0.12)
EOSINOPHIL # BLD AUTO: 0.24 K/UL (ref 0–0.51)
EOSINOPHIL NFR BLD: 5.9 % (ref 0–6.9)
ERYTHROCYTE [DISTWIDTH] IN BLOOD BY AUTOMATED COUNT: 54.1 FL (ref 35.9–50)
GLUCOSE BLD-MCNC: 95 MG/DL (ref 65–99)
HCT VFR BLD AUTO: 34.5 % (ref 37–47)
HGB BLD-MCNC: 11.1 G/DL (ref 12–16)
IMM GRANULOCYTES # BLD AUTO: 0.02 K/UL (ref 0–0.11)
IMM GRANULOCYTES NFR BLD AUTO: 0.5 % (ref 0–0.9)
LYMPHOCYTES # BLD AUTO: 1.1 K/UL (ref 1–4.8)
LYMPHOCYTES NFR BLD: 27 % (ref 22–41)
MAGNESIUM SERPL-MCNC: 1.7 MG/DL (ref 1.5–2.5)
MCH RBC QN AUTO: 30 PG (ref 27–33)
MCHC RBC AUTO-ENTMCNC: 32.2 G/DL (ref 33.6–35)
MCV RBC AUTO: 93.2 FL (ref 81.4–97.8)
MISCELLANEOUS LAB RESULT MISCLAB: NORMAL
MONOCYTES # BLD AUTO: 0.55 K/UL (ref 0–0.85)
MONOCYTES NFR BLD AUTO: 13.5 % (ref 0–13.4)
NEUTROPHILS # BLD AUTO: 2.13 K/UL (ref 2–7.15)
NEUTROPHILS NFR BLD: 52.1 % (ref 44–72)
NRBC # BLD AUTO: 0 K/UL
NRBC BLD-RTO: 0 /100 WBC
PLATELET # BLD AUTO: 249 K/UL (ref 164–446)
PMV BLD AUTO: 9.2 FL (ref 9–12.9)
RBC # BLD AUTO: 3.7 M/UL (ref 4.2–5.4)
WBC # BLD AUTO: 4.1 K/UL (ref 4.8–10.8)

## 2019-07-25 PROCEDURE — 700111 HCHG RX REV CODE 636 W/ 250 OVERRIDE (IP): Performed by: HOSPITALIST

## 2019-07-25 PROCEDURE — 97535 SELF CARE MNGMENT TRAINING: CPT

## 2019-07-25 PROCEDURE — 99232 SBSQ HOSP IP/OBS MODERATE 35: CPT | Performed by: HOSPITALIST

## 2019-07-25 PROCEDURE — A9270 NON-COVERED ITEM OR SERVICE: HCPCS | Performed by: FAMILY MEDICINE

## 2019-07-25 PROCEDURE — A9270 NON-COVERED ITEM OR SERVICE: HCPCS | Performed by: HOSPITALIST

## 2019-07-25 PROCEDURE — 700102 HCHG RX REV CODE 250 W/ 637 OVERRIDE(OP): Performed by: FAMILY MEDICINE

## 2019-07-25 PROCEDURE — 700101 HCHG RX REV CODE 250: Performed by: FAMILY MEDICINE

## 2019-07-25 PROCEDURE — 92526 ORAL FUNCTION THERAPY: CPT

## 2019-07-25 PROCEDURE — 82962 GLUCOSE BLOOD TEST: CPT

## 2019-07-25 PROCEDURE — 770001 HCHG ROOM/CARE - MED/SURG/GYN PRIV*

## 2019-07-25 PROCEDURE — 700102 HCHG RX REV CODE 250 W/ 637 OVERRIDE(OP): Performed by: HOSPITALIST

## 2019-07-25 PROCEDURE — A9270 NON-COVERED ITEM OR SERVICE: HCPCS | Performed by: INTERNAL MEDICINE

## 2019-07-25 PROCEDURE — 99233 SBSQ HOSP IP/OBS HIGH 50: CPT | Performed by: INTERNAL MEDICINE

## 2019-07-25 PROCEDURE — 85025 COMPLETE CBC W/AUTO DIFF WBC: CPT

## 2019-07-25 PROCEDURE — 700102 HCHG RX REV CODE 250 W/ 637 OVERRIDE(OP): Performed by: INTERNAL MEDICINE

## 2019-07-25 PROCEDURE — 83735 ASSAY OF MAGNESIUM: CPT

## 2019-07-25 RX ADMIN — SUCRALFATE 1 G: 1 SUSPENSION ORAL at 23:27

## 2019-07-25 RX ADMIN — SENNOSIDES, DOCUSATE SODIUM 2 TABLET: 50; 8.6 TABLET, FILM COATED ORAL at 17:11

## 2019-07-25 RX ADMIN — ETHAMBUTOL HYDROCHLORIDE 800 MG: 400 TABLET, FILM COATED ORAL at 05:43

## 2019-07-25 RX ADMIN — PRAMIPEXOLE DIHYDROCHLORIDE 0.25 MG: 0.5 TABLET ORAL at 17:11

## 2019-07-25 RX ADMIN — LEVOFLOXACIN 750 MG: 750 TABLET, FILM COATED ORAL at 05:42

## 2019-07-25 RX ADMIN — GABAPENTIN 400 MG: 400 CAPSULE ORAL at 17:11

## 2019-07-25 RX ADMIN — METOPROLOL TARTRATE 25 MG: 25 TABLET, FILM COATED ORAL at 05:44

## 2019-07-25 RX ADMIN — Medication 1 CAPSULE: at 05:43

## 2019-07-25 RX ADMIN — LEVETIRACETAM 500 MG: 500 TABLET, FILM COATED ORAL at 17:12

## 2019-07-25 RX ADMIN — PRAMIPEXOLE DIHYDROCHLORIDE 0.25 MG: 0.5 TABLET ORAL at 05:43

## 2019-07-25 RX ADMIN — SENNOSIDES, DOCUSATE SODIUM 2 TABLET: 50; 8.6 TABLET, FILM COATED ORAL at 05:43

## 2019-07-25 RX ADMIN — CINACALCET HYDROCHLORIDE 60 MG: 30 TABLET, COATED ORAL at 05:42

## 2019-07-25 RX ADMIN — PRAMIPEXOLE DIHYDROCHLORIDE 0.25 MG: 0.5 TABLET ORAL at 11:45

## 2019-07-25 RX ADMIN — RIFAMPIN 600 MG: 300 CAPSULE ORAL at 05:45

## 2019-07-25 RX ADMIN — HEPARIN SODIUM 5000 UNITS: 5000 INJECTION, SOLUTION INTRAVENOUS; SUBCUTANEOUS at 05:43

## 2019-07-25 RX ADMIN — LEVETIRACETAM 500 MG: 500 TABLET, FILM COATED ORAL at 05:43

## 2019-07-25 RX ADMIN — FERROUS SULFATE TAB 325 MG (65 MG ELEMENTAL FE) 325 MG: 325 (65 FE) TAB at 05:43

## 2019-07-25 RX ADMIN — LOSARTAN POTASSIUM 75 MG: 50 TABLET ORAL at 05:44

## 2019-07-25 RX ADMIN — GABAPENTIN 400 MG: 400 CAPSULE ORAL at 11:45

## 2019-07-25 RX ADMIN — TEMAZEPAM 15 MG: 15 CAPSULE ORAL at 23:27

## 2019-07-25 RX ADMIN — METOPROLOL TARTRATE 25 MG: 25 TABLET, FILM COATED ORAL at 17:12

## 2019-07-25 RX ADMIN — POTASSIUM CHLORIDE 20 MEQ: 20 TABLET, EXTENDED RELEASE ORAL at 05:44

## 2019-07-25 RX ADMIN — OXYCODONE HYDROCHLORIDE 10 MG: 10 TABLET, FILM COATED, EXTENDED RELEASE ORAL at 17:12

## 2019-07-25 RX ADMIN — AMLODIPINE BESYLATE 10 MG: 5 TABLET ORAL at 05:42

## 2019-07-25 RX ADMIN — ISONIAZID 300 MG: 300 TABLET ORAL at 05:42

## 2019-07-25 RX ADMIN — OXYCODONE HYDROCHLORIDE 10 MG: 10 TABLET, FILM COATED, EXTENDED RELEASE ORAL at 05:43

## 2019-07-25 RX ADMIN — ATORVASTATIN CALCIUM 10 MG: 10 TABLET, FILM COATED ORAL at 17:12

## 2019-07-25 RX ADMIN — PYRIDOXINE HCL TAB 50 MG 100 MG: 50 TAB at 05:43

## 2019-07-25 RX ADMIN — LIDOCAINE HYDROCHLORIDE 15 ML: 20 SOLUTION OROPHARYNGEAL at 08:10

## 2019-07-25 RX ADMIN — LIDOCAINE 2 PATCH: 50 PATCH CUTANEOUS at 10:25

## 2019-07-25 RX ADMIN — PYRAZINAMIDE 1000 MG: 500 TABLET ORAL at 05:42

## 2019-07-25 RX ADMIN — GABAPENTIN 400 MG: 400 CAPSULE ORAL at 05:43

## 2019-07-25 RX ADMIN — SUCRALFATE 1 G: 1 SUSPENSION ORAL at 17:11

## 2019-07-25 RX ADMIN — HEPARIN SODIUM 5000 UNITS: 5000 INJECTION, SOLUTION INTRAVENOUS; SUBCUTANEOUS at 17:12

## 2019-07-25 RX ADMIN — OMEPRAZOLE 20 MG: 20 CAPSULE, DELAYED RELEASE ORAL at 05:43

## 2019-07-25 ASSESSMENT — ENCOUNTER SYMPTOMS
SENSORY CHANGE: 0
SPEECH CHANGE: 0
VOMITING: 0
NAUSEA: 0
FEVER: 0
WHEEZING: 0
ABDOMINAL PAIN: 0
BLURRED VISION: 0
NECK PAIN: 0
CHILLS: 0
BACK PAIN: 0
COUGH: 0
NERVOUS/ANXIOUS: 0
WEAKNESS: 0
PALPITATIONS: 0
HEARTBURN: 0
FOCAL WEAKNESS: 0
SORE THROAT: 0
SHORTNESS OF BREATH: 0
HEADACHES: 0
DIARRHEA: 0
DIZZINESS: 0

## 2019-07-25 ASSESSMENT — COGNITIVE AND FUNCTIONAL STATUS - GENERAL
HELP NEEDED FOR BATHING: A LOT
DRESSING REGULAR LOWER BODY CLOTHING: A LITTLE
TOILETING: A LOT
DAILY ACTIVITIY SCORE: 17
DRESSING REGULAR UPPER BODY CLOTHING: A LITTLE
SUGGESTED CMS G CODE MODIFIER DAILY ACTIVITY: CK
PERSONAL GROOMING: A LITTLE

## 2019-07-25 NOTE — PROGRESS NOTES
Infectious Disease Progress Note    Author: Beatriz Johnson M.D. Date & Time of service: 7/24/2019  5:38 PM    Chief Complaint:  Brain abscess, gluteal abscess  Vertebral OM     Interval History:  68 y.o. female admitted 5/29/2019 as a transfer from [facility] since 4/30/2019. + diabetes, SANTOSH of right hip with hardware, and breast cancer who was originally admitted to [facility] on 03/08/2019 for right hip and pelvic pain.  Work-up revealed a right iliopsoas lesion, gluteal abscess, and mult brain abscesses     7/4 AF much more alert and conversant today-c/o left hip pain, unrelenting and would like parietal dressing changed. HA at surgical site.  Denies SE abx. No new neurodeficits  7/5 afebrile WBC 5.4.  Patient sleeping but arousable.  She denies any headache, nausea, vomiting.  7/6 afebrile WBC 6.5.  Patient sitting up in chair and having excruciating back pain  7/8 Pt has no specific complaints, she is mildly confused today.  She seems to be tolerating her medications with no significant lab abnormalities.  7/9 AF, O2 RA, Significant left hip pain today.  She does appear to be tolerating her antibiotics.  Ultrasound of bilateral lower extremities has been ordered.  7/10 AF, O2 RA,  Plan for IR drainage of sacral collection soon.   7/11 AF, O2 RA,  Pt continued on RIPE and ampho.  She is tolerating well overall, requiring some mag and potassium replacement.  She is complaining of severe pain in left hip again today.  Plan for biopsy later today.   7/12  IR biopsy of R gluteal abscess/hip. AF, O2 2 L NC,   tolerating antibiotics so far.  She is quite somnolent today but per nursing she was oriented x4 earlier  7/14 AF, O2 RA, more alert today, conversant and asking for advance in diet. Left hip pain ongoing but improved.   7/15 afebrile WBC 4.4.  Patient's speech is somewhat muffled but she is alert and responding to questions appropriately.  MRI shows worsening lesions and amphotericin stopped.  Nurse reports  waxing and waning mental status   afebrile.  Patient sleeping but arousable.  She is answer questions appropriately but then forgets that she is in the hospital.   afebrile WBC 4.9 patient is very drowsy as she states she did not sleep well yesterday.  She is asking about whether or not she has a fungal infection.  Plan of care discussed with patient.   afebrile patient continues to only endorse left hip pain exacerbated by sitting up in the chair.  She sat in the chair for 30 minutes yesterday.  Getting out of bed is limited secondary to left hip pain.  Mental status remains about the same with intermittent waxing and waning.  No new issues today per RN   afebrile patient is much more awake and alert today.  She is answering questions appropriately.  Yesterday she was complaining of some abdominal pain so CT scan was done and revealed interval enlargement of a hyperdense ovoid masslike structure posterior to the right ilium concerning for pseudoaneurysm.  2019 no fevers.  No new issues overnight.  Had her ultrasound today follow the results  2019-no fevers.  Ongoing pain.  The AFB cultures have come back positive for TB  2019 no fevers.  No new issues overnight.  Very anxious and agitated  Review of Systems:  Review of Systems   Constitutional: Positive for malaise/fatigue. Negative for chills and fever.   Respiratory: Negative for shortness of breath.    Gastrointestinal: Negative for abdominal pain, nausea and vomiting.   Musculoskeletal: Positive for joint pain.        Left hip   Neurological: Negative for dizziness and headaches.       Hemodynamics:  Temp (24hrs), Av.2 °C (97.1 °F), Min:36 °C (96.8 °F), Max:36.5 °C (97.7 °F)  Temperature: 36.1 °C (97 °F)  Pulse  Av.3  Min: 49  Max: 195   Blood Pressure : 125/61       Physical Exam:  Physical Exam   Constitutional:   Elderly  Chronically ill-appearing   HENT:   Mouth/Throat: Oropharynx is clear and moist. No oropharyngeal  exudate.   Right parietal surgical site. Well approximated, no drainage or surrounding erythema.     Eyes: Pupils are equal, round, and reactive to light. Conjunctivae and EOM are normal.   Neck: Neck supple.   Cardiovascular: Normal rate and regular rhythm.    Pulmonary/Chest: Effort normal. She has no wheezes. She has no rales.   Abdominal: Soft. There is no tenderness.   Musculoskeletal: She exhibits no edema.   Right upper extremity PICC line-nontender, no surrounding erythema   Neurological: She is alert.   Skin: She is not diaphoretic.       Meds:    Current Facility-Administered Medications:   •  levoFLOXacin  •  acetaminophen  •  hyoscyamine-maalox plus-lidocaine viscous  •  riFAMPin  •  thrombin  •  oxyCODONE immediate-release  •  oxyCODONE CR  •  temazepam  •  potassium chloride SA  •  losartan  •  pramipexole  •  senna-docusate  •  polyethylene glycol/lytes  •  magnesium hydroxide  •  gabapentin  •  ferrous sulfate  •  diphenhydrAMINE  •  heparin  •  pyridoxine  •  levETIRAcetam  •  isoniazid  •  ethambutol  •  pyrazinamide  •  Pharmacy Consult Request  •  labetalol  •  hydrALAZINE  •  lactobacillus rhamnosus  •  sucralfate  •  lidocaine  •  cyclobenzaprine  •  Respiratory Care per Protocol  •  amLODIPine  •  cinacalcet  •  atorvastatin  •  omeprazole  •  metoprolol  •  ondansetron  •  ondansetron    Labs:  Recent Labs      07/22/19   0330  07/23/19   0553  07/24/19   0344   WBC  5.8  4.5*  4.4*   RBC  3.84*  3.60*  3.42*   HEMOGLOBIN  11.3*  10.9*  9.9*   HEMATOCRIT  36.3*  33.8*  32.3*   MCV  94.5  93.9  94.4   MCH  29.4  30.3  28.9   RDW  55.6*  53.9*  54.6*   PLATELETCT  258  235  236   MPV  9.2  9.1  8.9*   NEUTSPOLYS  60.90  56.20  51.30   LYMPHOCYTES  20.70*  26.10  28.50   MONOCYTES  12.00  11.70  12.40   EOSINOPHILS  5.20  5.10  6.60   BASOPHILS  0.90  0.70  0.70     Recent Labs      07/22/19   0330   SODIUM  136   POTASSIUM  3.7   CHLORIDE  103   CO2  24   GLUCOSE  106*   BUN  13     Recent Labs       07/22/19   0330   CREATININE  0.62       Imaging:  MRI on 7/14/2019  Impression:       1.  Innumerable nodular enhancing lesions throughout the brain parenchyma both the supra and infratentorial compartments predominantly near the gray-white junction. There has been interval increase in size of several of these lesions since previous exam.   These changes may be secondary to metastatic disease or granulomatous disease.    2.  Interval right parietal craniotomy with underlying elliptical postsurgical defect.    3.  Interval increase in size of index right thalamic lesion which has increased vasogenic edema since previous exam.    4.  Age-related cerebral atrophy.       Micro:  Results     Procedure Component Value Units Date/Time    CULTURE TISSUE W/ GRM STAIN [[id number]] Collected:  07/11/19 1745    Order Status:  Completed Specimen:  Tissue Updated:  07/24/19 0742     Significant Indicator NEG     Source TISS     Site Right Hip Cores     Culture Result No growth at 12 days.     Gram Stain Result No organisms seen.    Narrative:       Radiology specimen Hold specimen 14 days per Dr. Junior    Anaerobic Culture [[id number]] Collected:  07/11/19 1745    Order Status:  Completed Specimen:  Tissue Updated:  07/24/19 0742     Significant Indicator NEG     Source TISS     Site Right Hip Cores     Culture Result Culture in progress.    Narrative:       Radiology specimen Hold specimen 14 days per Dr. Junior    Fungal Culture [[id number]] Collected:  06/28/19 1403    Order Status:  Completed Specimen:  Tissue Updated:  07/23/19 0848     Significant Indicator NEG     Source TISS     Site Right Parietal Lesion     Culture Result No fungal growth.    Narrative:       Surgery Specimen    Anaerobic Culture [[id number]] Collected:  06/28/19 1403    Order Status:  Completed Specimen:  Tissue Updated:  07/23/19 0848     Significant Indicator NEG     Source TISS     Site Right Parietal Lesion     Culture Result No Anaerobes  isolated.    Narrative:       Surgery Specimen    CULTURE TISSUE W/ GRM STAIN [[id number]] Collected:  06/28/19 1403    Order Status:  Completed Specimen:  Tissue Updated:  07/23/19 0848     Significant Indicator NEG     Source TISS     Site Right Parietal Lesion     Culture Result No growth at 72 hours.     Gram Stain Result Rare WBCs.  No organisms seen.      Narrative:       Surgery Specimen    AFB Culture [[id number]] Collected:  06/28/19 1403    Order Status:  Completed Specimen:  Tissue Updated:  07/23/19 0848     Significant Indicator NEG     Source TISS     Site Right Parietal Lesion     Culture Result Culture in progress.     AFB Smear Results No acid fast bacilli seen.    Narrative:       Surgery Specimen    Fungal Culture [[id number]] Collected:  06/19/19 0930    Order Status:  Completed Specimen:  Tissue Updated:  07/22/19 1949     Significant Indicator NEG     Source TISS     Site Right Hip deep bone     Culture Result No fungal growth.    Narrative:       CALL  [facility]  TEREZA tel. [phone],  Collected By:[id number] BEATRICE HARRIS  Bone biopsy right hip  Collected By:[id number] BEATRICE HARRIS  Right hip fluid collection  Collected By:[id number] BEATRICE HARRIS    AFB Culture [[id number]]  (Abnormal) Collected:  06/19/19 0930    Order Status:  Completed Specimen:  Tissue from Other Body Fluid Updated:  07/22/19 1949     Significant Indicator POS (POS)     Source TISS     Site Right Hip deep bone     Culture Result Acid fast bacilli detected by MGIT 960 instrument.  Identification to follow.   (A)     AFB Smear Results No acid fast bacilli seen.     Culture Result Mycobacterium tuberculosis complex  By DNA probe  Positive for M. tb complex  Negative for M. avium complex  Negative for M. gordonae  Negative for M. kansasii  Testing performed at:  [facility]  [address] [place] [id number]  [phone]   (A)    Narrative:       CALL  [facility]  TEREZA tel. [phone],  Collected By:[id number]  "BEATRICE HARRIS  Bone biopsy right hip  Collected By:[id number] BEATRICE HARRIS  Right hip fluid collection  Collected By:[id number] BEATRICE HARRIS    Fungal Smear [[id number]] Collected:  06/19/19 0930    Order Status:  Completed Specimen:  Tissue from Other Body Fluid Updated:  07/22/19 1949     Significant Indicator NEG     Source TISS     Site Right Hip deep bone     Fungal Smear Results No fungal elements seen.    Narrative:       CALL  [facility]  TEREZA tel. [phone],  Collected By:[id number] BEATRICE HARRIS  Bone biopsy right hip  Collected By:[id number] BEATRICE HARRIS  Right hip fluid collection  Collected By:[id number] BEATRICE HARRIS    CULTURE TISSUE W/ GRM STAIN [[id number]] Collected:  06/19/19 0930    Order Status:  Completed Specimen:  Tissue Updated:  07/22/19 1949     Significant Indicator NEG     Source TISS     Site Right Hip deep bone     Culture Result No growth at 72 hours.     Gram Stain Result No organisms seen.    Narrative:       CALL  [facility]  TEREZA tel. [phone],  Collected By:[id number] BEATRICE HARRIS  Bone biopsy right hip  Collected By:[id number] BEATRICE HARRIS  Right hip fluid collection  Collected By:[id number] BEATRICE HARRIS          Assessment:  Active Hospital Problems    Diagnosis   • *Brain lesion [G93.9]   • Granulomatous disease  [L92.9]   • Abscess of right iliac muscle and right gluteus medius muscle [L02.91]   • Pseudoaneurysm following procedure (HCC) [I97.89, I72.9]   • Sepsis ([facility]) [A41.9]   • History of breast cancer [Z85.3]       Assessment/Plan:  Encephalopathy, intermittent waxing and waning     Brain lesions, worse on MRI  Tubercular  MRI 4/23 \"supra and infratentorial brain parenchyma. Decrease in the size of the lesions. Interval reduction in the extent of the surrounding white matter edema consistent with improvement/treatment response of the most of the multifocal brain abscesses.  MRI 5/21 showed innumerable microabscesses vs mets  Abx (vancomycin, cefepime and Flagyl) discontinued on 5/23 after ~8 weeks of " treatment-developed new fevers on 6/4  MRI on 6/8 with interval progression of supra and infratentorial intra--axial nodules and associated edema.  New right thalamus lesion. Radiology favors infection over neoplasm though both remain considerations (had been off abx)  Mult blood cultures neg  CHAS neg 3/15 and 5/24  MRI 6/22 worsening CNS lesions  S/p right craniotomy and resection of mass with biopsy on 6/28. Biopsy-per note fungal appearing elements seen per Neurosurgery-discussed with pathologist who examined the frozen section and there were no fungal appearing elements.   +necrotizing granulomas. All stains negative in EPIC  Fungal culture- negative to date  Bacterial cultures-negative to date  AFB culture-in progress  Pathology- focal necrotizing granulomatous inflammation; no fungal elements or acid-fast bacilli on stains.  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 but we have no confirmed tissue diagnosis  Continue pyridoxine (B6) while on isoniazid  Will need occasional testing AST/ALT, both so far within normal limits  Follow-up brain biopsy specimen sent to [facility] for PCR testing, bacterial, fungal, AFB   Follow-up serologic testing sent out to [facility]  Brucella ab - negative  Coxiella ab - negative   Histo ab serum & antigen urine-negative  Blasto ab - negative      Discussed MRI findings with radiologist and there is easily obtained tissue/fluid in the sacrum and area that has been worsening despite therapy, per radiology images most consistent to TB or Brucellosis  -Pelvic biopsy on 7/11 - fluid drainage and specimen sent to [facility]  "again for AFB, bacterial and fungal, crypto and cocci PCR-confirmed with Alex this was sent out on 7/12   Pathology  - No definite acid fast or fungal organisms are seen.     Gluteal and iliopsoas abscess   OM L5, S1-3, new this admission/skeletal TB  Recurrent abscesses  Adjacent to hardware in right hip (placed 12/17/18)  CT 4/23 \"Fluid collections within the right gluteal and iliacis muscles.. The collection within the right gluteal muscle has unchanged while the fluid collection within the right iliacis muscle is increased somewhat in size.\" 2.7 cm  Cultures 3/29 and 4/4 neg  MRI on 5/20/2019 - interval decrease in collection in R gluteal muscle and persistent multiloculated collection in R iliacus muscle.   CT 5/28 no change  s/p drainage on 5/30/2019-cultures negative  S/p removal hardware 6/5-no cultures done  1, 3 beta glucan neg  CT on 6/8 with residual abscess in the right iliacus muscle, 2.5 x 1.8 cm, slightly smaller than prior  s/p CT-guided deep bone biopsy 6/19/2019.  Cultures remain negative; path not reported   MRI 6/28 chronic discitis, diffuse OM L5, 3 and 4 cm abscesses right glute, 1.5 cm abscess or necrosis right posterior ileum, 3.3 cm abscess right SI joint  Continue TB therapy  Discussed with the [facility] and since the patient has already been on therapy and acute inflammatory response is not an issue we will hold off on steroids.  If she develops any seizures or any focal deficits we will start to see steroids at that time.  Another reason to avoid the steroids as the patient's diabetes  At the suggestion of [facility] we will also start the patient on Levaquin     Right gluteal pseudoaneurysm  significant interval enlargement noted by IR during procedure on 7/11  -CTA planned      +QuantiGold  Query disseminated TB  Path with necrotizing granulomas  AFB stain neg  AFB cultures pending  All prior AFB cxs still neg  Started RIPE +B6 7/3  Monitor for visual changes while on ethambutol.  " None currently.   Ophthalmology evaluation for baseline vision as well as color vision testing when able     Type 2 DM  Hemoglobin A1c 6.3   Keep BS under 150 to help control current infection      Discussed with IM RN

## 2019-07-25 NOTE — PROGRESS NOTES
Patient given oxycodone x 1 for pain at 2200. Patient disoriented to time and place at 0300. Patient called brother stating she was not in her room. Reoriented patient. Prior nursing staff stated that patient became confused last night as well. No other neuro changes. Will continue to monitor.

## 2019-07-25 NOTE — DISCHARGE PLANNING
Agency/Facility Name: [facility]  Spoke To: Krunal  Outcome: Patient accepted.    Agency/Facility Name: [facility], [facility], [facility], [facility]  Plan or Request: Referral sent to SNFs listed above per Deedee's(LSW) request.

## 2019-07-25 NOTE — THERAPY
"Occupational Therapy Treatment completed with focus on ADLs, ADL transfers and patient education.  Functional Status:    Pt seen for OT treatment this afternoon. Resting in bed on entry. Pt completed LB dressing in supine with supv, sup>sit with supv, sit><stand with CGA. Pt ambulated to sink using FWW, requiring significant cuing to correctly use FWW. Pt tends to hold it to close and then requests assist with pushing it. Once at sink, pt brushed her hair with supv. She then ambulated to bathroom, transferred to toilet with Min A, max A for toileting. Pt returned to bed, again needing cuing for proper FWW mgmt. Min A B2B.     Plan of Care: Will benefit from Occupational Therapy 3 times per week  Discharge Recommendations:  Equipment Will Continue to Assess for Equipment Needs. Post-acute therapy Recommend post-acute placement for additional occupational therapy services prior to discharge home. Patient can tolerate post-acute therapies at a 5x/week frequency.      See \"Rehab Therapy-Acute\" Patient Summary Report for complete documentation.   "

## 2019-07-25 NOTE — DISCHARGE PLANNING
Spoke To: Krunal(Admissions)  Agency/Facility Name: [facility]  Plan or Request: Updated notes manually faxed to [facility] per Deedee's(LSW) request. Krunal notified.

## 2019-07-25 NOTE — CARE PLAN
Problem: Safety  Goal: Will remain free from injury  Outcome: PROGRESSING AS EXPECTED  Bed low and locked, alarm set, call bell within reach.  Hourly rounding continues      Problem: Mobility  Goal: Risk for activity intolerance will decrease  Outcome: PROGRESSING AS EXPECTED  Pt encouraged to get oob for meals and to restroom vs using bedpan

## 2019-07-25 NOTE — PROGRESS NOTES
Infectious Disease Progress Note    Author: Beatriz Johnson M.D. Date & Time of service: 7/25/2019  4:59 PM    Chief Complaint:  Brain abscess, gluteal abscess  Vertebral OM     Interval History:  68 y.o. female admitted 5/29/2019 as a transfer from [facility] since 4/30/2019. + diabetes, SANTOSH of right hip with hardware, and breast cancer who was originally admitted to [facility] on 03/08/2019 for right hip and pelvic pain.  Work-up revealed a right iliopsoas lesion, gluteal abscess, and mult brain abscesses     7/4 AF much more alert and conversant today-c/o left hip pain, unrelenting and would like parietal dressing changed. HA at surgical site.  Denies SE abx. No new neurodeficits  7/5 afebrile WBC 5.4.  Patient sleeping but arousable.  She denies any headache, nausea, vomiting.  7/6 afebrile WBC 6.5.  Patient sitting up in chair and having excruciating back pain  7/8 Pt has no specific complaints, she is mildly confused today.  She seems to be tolerating her medications with no significant lab abnormalities.  7/9 AF, O2 RA, Significant left hip pain today.  She does appear to be tolerating her antibiotics.  Ultrasound of bilateral lower extremities has been ordered.  7/10 AF, O2 RA,  Plan for IR drainage of sacral collection soon.   7/11 AF, O2 RA,  Pt continued on RIPE and ampho.  She is tolerating well overall, requiring some mag and potassium replacement.  She is complaining of severe pain in left hip again today.  Plan for biopsy later today.   7/12  IR biopsy of R gluteal abscess/hip. AF, O2 2 L NC,   tolerating antibiotics so far.  She is quite somnolent today but per nursing she was oriented x4 earlier  7/14 AF, O2 RA, more alert today, conversant and asking for advance in diet. Left hip pain ongoing but improved.   7/15 afebrile WBC 4.4.  Patient's speech is somewhat muffled but she is alert and responding to questions appropriately.  MRI shows worsening lesions and amphotericin stopped.  Nurse reports  waxing and waning mental status   afebrile.  Patient sleeping but arousable.  She is answer questions appropriately but then forgets that she is in the hospital.   afebrile WBC 4.9 patient is very drowsy as she states she did not sleep well yesterday.  She is asking about whether or not she has a fungal infection.  Plan of care discussed with patient.   afebrile patient continues to only endorse left hip pain exacerbated by sitting up in the chair.  She sat in the chair for 30 minutes yesterday.  Getting out of bed is limited secondary to left hip pain.  Mental status remains about the same with intermittent waxing and waning.  No new issues today per RN   afebrile patient is much more awake and alert today.  She is answering questions appropriately.  Yesterday she was complaining of some abdominal pain so CT scan was done and revealed interval enlargement of a hyperdense ovoid masslike structure posterior to the right ilium concerning for pseudoaneurysm.  2019 no fevers.  No new issues overnight.  Had her ultrasound today follow the results  2019-no fevers.  Ongoing pain.  The AFB cultures have come back positive for TB  2019 no fevers.  No new issues overnight.  Very anxious and agitated  2019 no fevers.  No new issues.  Says she feels slightly better.  Review of Systems:  Review of Systems   Constitutional: Positive for malaise/fatigue. Negative for chills and fever.   Respiratory: Negative for shortness of breath.    Gastrointestinal: Negative for abdominal pain, nausea and vomiting.   Musculoskeletal: Positive for joint pain.   Neurological: Negative for dizziness and headaches.       Hemodynamics:  Temp (24hrs), Av.2 °C (97.2 °F), Min:36 °C (96.8 °F), Max:36.4 °C (97.6 °F)  Temperature: 36.4 °C (97.6 °F)  Pulse  Av.2  Min: 49  Max: 195   Blood Pressure : 119/47       Physical Exam:  Physical Exam   Constitutional:   Elderly  Chronically ill-appearing   HENT:    Mouth/Throat: Oropharynx is clear and moist. No oropharyngeal exudate.   Right parietal surgical site. Well approximated, no drainage or surrounding erythema.     Eyes: Pupils are equal, round, and reactive to light. Conjunctivae and EOM are normal.   Neck: Neck supple.   Cardiovascular: Normal rate and regular rhythm.    Pulmonary/Chest: Effort normal. She has no wheezes. She has no rales.   Abdominal: Soft. There is no tenderness.   Musculoskeletal: She exhibits no edema.   Right upper extremity PICC line-nontender, no surrounding erythema   Neurological: She is alert.   Skin: She is not diaphoretic.       Meds:    Current Facility-Administered Medications:   •  levoFLOXacin  •  acetaminophen  •  hyoscyamine-maalox plus-lidocaine viscous  •  riFAMPin  •  thrombin  •  oxyCODONE immediate-release  •  oxyCODONE CR  •  temazepam  •  potassium chloride SA  •  losartan  •  pramipexole  •  senna-docusate  •  polyethylene glycol/lytes  •  magnesium hydroxide  •  gabapentin  •  ferrous sulfate  •  diphenhydrAMINE  •  heparin  •  pyridoxine  •  levETIRAcetam  •  isoniazid  •  ethambutol  •  pyrazinamide  •  Pharmacy Consult Request  •  labetalol  •  hydrALAZINE  •  lactobacillus rhamnosus  •  sucralfate  •  lidocaine  •  cyclobenzaprine  •  Respiratory Care per Protocol  •  amLODIPine  •  cinacalcet  •  atorvastatin  •  omeprazole  •  metoprolol  •  ondansetron  •  ondansetron    Labs:  Recent Labs      07/23/19   0553  07/24/19   0344  07/25/19   0400   WBC  4.5*  4.4*  4.1*   RBC  3.60*  3.42*  3.70*   HEMOGLOBIN  10.9*  9.9*  11.1*   HEMATOCRIT  33.8*  32.3*  34.5*   MCV  93.9  94.4  93.2   MCH  30.3  28.9  30.0   RDW  53.9*  54.6*  54.1*   PLATELETCT  235  236  249   MPV  9.1  8.9*  9.2   NEUTSPOLYS  56.20  51.30  52.10   LYMPHOCYTES  26.10  28.50  27.00   MONOCYTES  11.70  12.40  13.50*   EOSINOPHILS  5.10  6.60  5.90   BASOPHILS  0.70  0.70  1.00     No results for input(s): SODIUM, POTASSIUM, CHLORIDE, CO2,  GLUCOSE, BUN, CPKTOTAL in the last 72 hours.  No results for input(s): ALBUMIN, TBILIRUBIN, ALKPHOSPHAT, TOTPROTEIN, ALTSGPT, ASTSGOT, CREATININE in the last 72 hours.    Imaging:  MRI on 7/14/2019  Impression:       1.  Innumerable nodular enhancing lesions throughout the brain parenchyma both the supra and infratentorial compartments predominantly near the gray-white junction. There has been interval increase in size of several of these lesions since previous exam.   These changes may be secondary to metastatic disease or granulomatous disease.    2.  Interval right parietal craniotomy with underlying elliptical postsurgical defect.    3.  Interval increase in size of index right thalamic lesion which has increased vasogenic edema since previous exam.    4.  Age-related cerebral atrophy.       Micro:  Results     Procedure Component Value Units Date/Time    CULTURE TISSUE W/ GRM STAIN [[id number]] Collected:  07/11/19 1745    Order Status:  Completed Specimen:  Tissue Updated:  07/25/19 0743     Significant Indicator NEG     Source TISS     Site Right Hip Cores     Culture Result No growth at 13 days.     Gram Stain Result No organisms seen.    Narrative:       Radiology specimen Hold specimen 14 days per Dr. Junior    Anaerobic Culture [[id number]] Collected:  07/11/19 1745    Order Status:  Completed Specimen:  Tissue Updated:  07/25/19 0743     Significant Indicator NEG     Source TISS     Site Right Hip Cores     Culture Result Culture in progress.    Narrative:       Radiology specimen Hold specimen 14 days per Dr. Junior    Fungal Culture [[id number]] Collected:  06/28/19 1403    Order Status:  Completed Specimen:  Tissue Updated:  07/23/19 0848     Significant Indicator NEG     Source TISS     Site Right Parietal Lesion     Culture Result No fungal growth.    Narrative:       Surgery Specimen    Anaerobic Culture [[id number]] Collected:  06/28/19 1403    Order Status:  Completed Specimen:  Tissue Updated:   07/23/19 0848     Significant Indicator NEG     Source TISS     Site Right Parietal Lesion     Culture Result No Anaerobes isolated.    Narrative:       Surgery Specimen    CULTURE TISSUE W/ GRM STAIN [[id number]] Collected:  06/28/19 1403    Order Status:  Completed Specimen:  Tissue Updated:  07/23/19 0848     Significant Indicator NEG     Source TISS     Site Right Parietal Lesion     Culture Result No growth at 72 hours.     Gram Stain Result Rare WBCs.  No organisms seen.      Narrative:       Surgery Specimen    AFB Culture [[id number]] Collected:  06/28/19 1403    Order Status:  Completed Specimen:  Tissue Updated:  07/23/19 0848     Significant Indicator NEG     Source TISS     Site Right Parietal Lesion     Culture Result Culture in progress.     AFB Smear Results No acid fast bacilli seen.    Narrative:       Surgery Specimen    Fungal Culture [[id number]] Collected:  06/19/19 0930    Order Status:  Completed Specimen:  Tissue Updated:  07/22/19 1949     Significant Indicator NEG     Source TISS     Site Right Hip deep bone     Culture Result No fungal growth.    Narrative:       CALL  [facility]  TEREZA tel. [phone],  Collected By:[id number] HARRIS  Bone biopsy right hip  Collected By:[id number] BEATRICE HARRIS  Right hip fluid collection  Collected By:[id number] BEATRICE HARRIS    AFB Culture [[id number]]  (Abnormal) Collected:  06/19/19 0930    Order Status:  Completed Specimen:  Tissue from Other Body Fluid Updated:  07/22/19 1949     Significant Indicator POS (POS)     Source TISS     Site Right Hip deep bone     Culture Result Acid fast bacilli detected by MGIT 960 instrument.  Identification to follow.   (A)     AFB Smear Results No acid fast bacilli seen.     Culture Result Mycobacterium tuberculosis complex  By DNA probe  Positive for M. tb complex  Negative for M. avium complex  Negative for M. gordonae  Negative for M. kansasii  Testing performed at:  [facility]  [address] [place] [address]  "JOSEP Mckeon [id number]  [phone]   (A)    Narrative:       CALL  [facility]  TEREZA tel. [phone],  Collected By:[id number] BEATRICE HARRIS  Bone biopsy right hip  Collected By:[id number] BEATRICE HARRIS  Right hip fluid collection  Collected By:[id number] BEATRICE HARRIS    Fungal Smear [[id number]] Collected:  06/19/19 0930    Order Status:  Completed Specimen:  Tissue from Other Body Fluid Updated:  07/22/19 1949     Significant Indicator NEG     Source TISS     Site Right Hip deep bone     Fungal Smear Results No fungal elements seen.    Narrative:       CALL  [facility]  TEREZA tel. [phone],  Collected By:[id number] BEATRICE HARRIS  Bone biopsy right hip  Collected By:[id number] BEATRICE HARRIS  Right hip fluid collection  Collected By:[id number] BEATRICE HARRIS    CULTURE TISSUE W/ GRM STAIN [[id number]] Collected:  06/19/19 0930    Order Status:  Completed Specimen:  Tissue Updated:  07/22/19 1949     Significant Indicator NEG     Source TISS     Site Right Hip deep bone     Culture Result No growth at 72 hours.     Gram Stain Result No organisms seen.    Narrative:       CALL  [facility]  TEREZA tel. [phone],  Collected By:[id number] BEATRICE HARRIS  Bone biopsy right hip  Collected By:[id number] BEATRICE HARRIS  Right hip fluid collection  Collected By:[id number] BEATRICE HARRIS          Assessment:  Active Hospital Problems    Diagnosis   • *Brain lesion [G93.9]   • Granulomatous disease  [L92.9]   • Abscess of right iliac muscle and right gluteus medius muscle [L02.91]   • Pseudoaneurysm following procedure ([facility]) [I97.89, I72.9]   • Sepsis ([facility]) [A41.9]   • History of breast cancer [Z85.3]       Assessment/Plan:  Encephalopathy, intermittent waxing and waning     Brain lesions, worse on MRI  Tubercular  MRI 4/23 \"supra and infratentorial brain parenchyma. Decrease in the size of the lesions. Interval reduction in the extent of the surrounding white matter edema consistent with improvement/treatment response of the most of the multifocal brain abscesses.  MRI 5/21 " showed innumerable microabscesses vs mets  Abx (vancomycin, cefepime and Flagyl) discontinued on 5/23 after ~8 weeks of treatment-developed new fevers on 6/4  MRI on 6/8 with interval progression of supra and infratentorial intra--axial nodules and associated edema.  New right thalamus lesion. Radiology favors infection over neoplasm though both remain considerations (had been off abx)  Mult blood cultures neg  CHAS neg 3/15 and 5/24  MRI 6/22 worsening CNS lesions  S/p right craniotomy and resection of mass with biopsy on 6/28. Biopsy-per note fungal appearing elements seen per Neurosurgery-discussed with pathologist who examined the frozen section and there were no fungal appearing elements.   +necrotizing granulomas. All stains negative in EPIC  Fungal culture- negative to date  Bacterial cultures-negative to date  AFB culture-in progress  Pathology- focal necrotizing granulomatous inflammation; no fungal elements or acid-fast bacilli on stains.  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 but we have no confirmed tissue diagnosis  Continue pyridoxine (B6) while on isoniazid  Will need occasional testing AST/ALT, both so far within normal limits  Follow-up brain biopsy specimen sent to [facility] for PCR testing, bacterial, fungal, AFB   Follow-up serologic testing sent out to [facility]  Brucella ab - negative  Coxiella ab - negative   Histo ab serum & antigen urine-negative  Blasto ab - negative      Discussed MRI findings with radiologist and there is easily obtained tissue/fluid in the sacrum and area that has been worsening despite therapy, per  "radiology images most consistent to TB or Brucellosis  -Pelvic biopsy on 7/11 - fluid drainage and specimen sent to [facility] again for AFB, bacterial and fungal, crypto and cocci PCR-confirmed with Alex this was sent out on 7/12   Pathology  - No definite acid fast or fungal organisms are seen.     Gluteal and iliopsoas abscess   OM L5, S1-3, new this admission/skeletal TB  Recurrent abscesses  Adjacent to hardware in right hip (placed 12/17/18)  CT 4/23 \"Fluid collections within the right gluteal and iliacis muscles.. The collection within the right gluteal muscle has unchanged while the fluid collection within the right iliacis muscle is increased somewhat in size.\" 2.7 cm  Cultures 3/29 and 4/4 neg  MRI on 5/20/2019 - interval decrease in collection in R gluteal muscle and persistent multiloculated collection in R iliacus muscle.   CT 5/28 no change  s/p drainage on 5/30/2019-cultures negative  S/p removal hardware 6/5-no cultures done  1, 3 beta glucan neg  CT on 6/8 with residual abscess in the right iliacus muscle, 2.5 x 1.8 cm, slightly smaller than prior  s/p CT-guided deep bone biopsy 6/19/2019.  Cultures remain negative; path not reported   MRI 6/28 chronic discitis, diffuse OM L5, 3 and 4 cm abscesses right glute, 1.5 cm abscess or necrosis right posterior ileum, 3.3 cm abscess right SI joint  Continue TB therapy  Discussed with the [facility] and since the patient has already been on therapy and acute inflammatory response is not an issue we will hold off on steroids.  If she develops any seizures or any focal deficits we will start to see steroids at that time.  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  None currently.   Ophthalmology evaluation for baseline vision as well as color vision testing when able     Type 2 DM  Hemoglobin A1c 6.3   Keep BS under 150 to help control current infection      Discussed with IM RN

## 2019-07-25 NOTE — PROGRESS NOTES
"Assumed care of pt at 0700.  Pt alert and oriented to person and place this morning.  Slightly more confused than yesterday; she keeps thinking she was moved to a different room in the middle of the night.  Going to try and hold off giving PRN narcotics today.  She denies any pain or discomfort but seems to be in more pain when she moves oob.  PICC still in place; awaiting ID to DC.  Apparently ID doctor came by yesterday when I was at lunch and told one of my coworkers to \"pull the PICC\" but orders were never placed.  Bed low and locked, alarm set and call bell within reach.  Hourly rounding continues.    "

## 2019-07-25 NOTE — DISCHARGE PLANNING
Anticipated Discharge Disposition: SNF    Action: Dr. Sorensen has placed a discharge order.    MARIPOSAW requested [facility] f/u with [facility] to see if they can still accept pt.    LSW spoke with Darlyn MOORE pt's brother is bedside and requested to speak with this worker.    LSW attempted to see pt's brother bedside. LSW will attempt at a later time.    LSW spoke with pt's brother, René. He would like to have pt placed near him and his brother, Delonte in [place] [address]. LSW provided him with the Medicare website so he can look at the ratings. He gave this worker permission to send out referrals to all SNF's in the area. He is going back to [place] tonight and can be reached at [phone]/ [email or web address].    LSW complied a list of SNF in the [facility] zip code and faxed the first 6 to [facility]inda CANO to send out referrals.     LSW received a VM from René. His first SNF choice is [facility].    Barriers to Discharge: SNF Acceptance    Plan: LSW to f/u with [facility]

## 2019-07-25 NOTE — THERAPY
"Speech Language Therapy dysphagia treatment completed.   Functional Status:  The patient was seen with afternoon meal after being repositioned upright at 80 degrees in bed. She kept attempting to recline while she was eating. Min cues needed for good swallow precautions. She consumed regular trial tray without overt signs of aspiration. She was using her non dominant hand to eat due to pain on her right side. She appears appropriate for a diet upgrade to regular. Finger foods would be easiest. She still needs supervision, indirect, due to trying to eat while reclined and needing assistance with meal tray set-up.  Recommendations: Regular diet/finger foods with intermittent supervision/assistance. Please assist patient with meal tray set up. She tries to recline when eating.  Plan of Care: Will benefit from Speech Therapy 3 times per week  Post-Acute Therapy: Recommend inpatient transitional care services for continued speech therapy services regarding cognitive/speech therapy (see cognitive evaluation 7/19/19).        See \"Rehab Therapy-Acute\" Patient Summary Report for complete documentation.     "

## 2019-07-25 NOTE — PROGRESS NOTES
Hospital Medicine Daily Progress Note    Date of Service  7/25/2019    Chief Complaint  68 y.o. female admitted 5/29/2019 with right iliac and gluteus abscess    Hospital Course  Patient is a 68 year old with history of breast cancer, copd, dm, gerd, dl and htn who was admitted with right iliac gluteus abscess.  She also has a known history of brain lesions with increasing enhancement noted on MRI and possible chronic osteomyelitis of the lumbar spine.    Interval Problem Update  Some confusion this am but axox3  Right hip pain 5/10  Nursing reports some gerd this am that resolved, no nausea, no dizziness  Ros otherwise negative  I attempted to update her brother Arsh about results, left message for him at [phone] to return my call    Consultants/Specialty  ID  Neurosurgery    Code Status  Full    Disposition  snf pending    Review of Systems  Review of Systems   Constitutional: Negative for chills, fever and malaise/fatigue.   HENT: Negative for hearing loss and sore throat.    Eyes: Negative for blurred vision.   Respiratory: Negative for cough, shortness of breath and wheezing.    Cardiovascular: Negative for chest pain, palpitations and leg swelling.   Gastrointestinal: Negative for abdominal pain, diarrhea, heartburn, nausea and vomiting.   Genitourinary: Negative for dysuria.   Musculoskeletal: Negative for back pain, joint pain and neck pain.   Skin: Negative for rash.   Neurological: Negative for dizziness, sensory change, speech change, focal weakness, weakness and headaches.   Psychiatric/Behavioral: The patient is not nervous/anxious.         Physical Exam  Temp:  [36 °C (96.8 °F)-36.2 °C (97.2 °F)] 36.2 °C (97.1 °F)  Pulse:  [71-89] 76  Resp:  [16-18] 17  BP: (111-141)/(53-77) 114/53  SpO2:  [93 %-100 %] 93 %    Physical Exam   Constitutional: She appears well-developed and well-nourished. No distress.   HENT:   Head: Normocephalic and atraumatic.   Mouth/Throat: Oropharynx is clear and moist.    Surgical site cdi   Eyes: Pupils are equal, round, and reactive to light. Conjunctivae are normal.   Neck: Normal range of motion. Neck supple.   Cardiovascular: Normal rate, regular rhythm, normal heart sounds and intact distal pulses.  Exam reveals no gallop and no friction rub.    No murmur heard.  Pulmonary/Chest: Effort normal and breath sounds normal. No respiratory distress. She has no wheezes. She has no rales. She exhibits no tenderness.   Abdominal: Soft. Bowel sounds are normal. She exhibits no distension and no mass. There is no tenderness. There is no rebound and no guarding.   Musculoskeletal: Normal range of motion. She exhibits no edema or tenderness.   Neurological: She is alert. She has normal reflexes. No cranial nerve deficit. She exhibits normal muscle tone. Coordination normal.   Skin: Skin is warm and dry. No rash noted. She is not diaphoretic. No erythema. No pallor.   Nursing note and vitals reviewed.      Fluids    Intake/Output Summary (Last 24 hours) at 07/25/19 1154  Last data filed at 07/25/19 0810   Gross per 24 hour   Intake              120 ml   Output                0 ml   Net              120 ml       Laboratory  Recent Labs      07/23/19   0553  07/24/19   0344  07/25/19   0400   WBC  4.5*  4.4*  4.1*   RBC  3.60*  3.42*  3.70*   HEMOGLOBIN  10.9*  9.9*  11.1*   HEMATOCRIT  33.8*  32.3*  34.5*   MCV  93.9  94.4  93.2   MCH  30.3  28.9  30.0   MCHC  32.2*  30.7*  32.2*   RDW  53.9*  54.6*  54.1*   PLATELETCT  235  236  249   MPV  9.1  8.9*  9.2                       Imaging  US-EXTREMITY ARTERY LOWER UNILAT RIGHT   Final Result      Right gluteal pseudoaneurysm measuring 3.65 x 2.24 x 2.31 cm.      IR-INJ-EXT PSEUDOANEURYSM PERC   Final Result      1.  Ultrasound guided thrombin injection for treatment of a RIGHT gluteal 3.6 cm pseudoaneurysm.      CTA ABDOMEN PELVIS W & W/O POST PROCESS   Final Result      1.  Interval enlargement of a hyperdense ovoid masslike structure  immediately posterior to the right ilium, possibly representing a pseudoaneurysm.   2.  Stable thin-walled apparent fluid-filled structure immediately deep to the right ilium.   3.   Minimal retroperitoneal adenopathy, grossly stable.   4.  Cholelithiasis.      MR-BRAIN-WITH & W/O   Final Result      1.  Innumerable nodular enhancing lesions throughout the brain parenchyma both the supra and infratentorial compartments predominantly near the gray-white junction. There has been interval increase in size of several of these lesions since previous exam.    These changes may be secondary to metastatic disease or granulomatous disease.      2.  Interval right parietal craniotomy with underlying elliptical postsurgical defect.      3.  Interval increase in size of index right thalamic lesion which has increased vasogenic edema since previous exam.      4.  Age-related cerebral atrophy.      IR-PICC LINE PLACEMENT W/ GUIDANCE > AGE 5   Final Result                  Ultrasound-guided PICC placement performed by qualified nursing staff as    above.          CT-NEEDLE BX-ABD-RETROPERITONL   Final Result      1.  CT GUIDED biopsy of a right pelvic phlegmon.      2. Significant interval enlargement of a right gluteal pseudoaneurysm. CTA and consideration for possible intervention either with direct thrombin injection or endovascular treatment was suggested. This was conveyed to Dr. Alatorre on 7/11/2019 via [place]    text at 1750 hours.      [id number] VIEW   Final Result         1.  Moderate stool in the colon suggests changes of constipation, otherwise nonspecific bowel gas pattern   2.  Atherosclerosis      US-EXTREMITY VENOUS LOWER BILAT   Final Result      IR-US GUIDED PIV   Final Result    Ultrasound-guided PERIPHERAL IV INSERTION performed by    qualified nursing staff as above.            MR-LUMBAR SPINE-WITH & W/O   Final Result         1.  Grade 2-3 spondylolisthesis at the L5-S1 level with bilateral spondylolysis of  the pars interarticularis. This causes severe bilateral neural foraminal stenosis at this level.      2.  Interval removal of posterior fusion hardware at the lumbosacral junction.      3.  Diffuse osteomyelitis involving the L5 vertebral body and the first 3 sacral segments.      4.  Chronic discitis at the L5-S1 level with anterior paraspinous abscess at this level and anterior to the S1 sacral segment.      5.  Smaller intraosseous bone bone abscesses or areas of necrosis noted in the sacrum.      6.  There is also evidence of osteomyelitis involving the jose antonio and sacrum bilaterally along the course of the previous sacral fixation screw. There is a large 4 cm abscess noted in the right gluteal soft tissues in a smaller 3 cm chronic appearing    abscess in the left gluteal musculature.      7.  There is a 1.5 cm intraosseous abscess or area of necrosis in the right posterior ilium.      8.  There is a 3.3 cm centimeters multiloculated abscess anterior to the right sacroiliac joint.      MR-STEALTH BRAIN WITH & W/O   Final Result         1.  Innumerable subcentimeter brain metastases, many at the gray-white junction but also multiple noted throughout the basal ganglia and brainstem.      2.  Largest index lesion is noted in the right thalamus and has increased in size since previous exam and currently measures 9 mm and has a moderate amount of surrounding vasogenic edema.      US-EXTREMITY NON VASCULAR UNILATERAL RIGHT   Final Result      No right hip joint effusion. No soft tissue fluid collection.      MR-BRAIN-WITH & W/O   Final Result      1.  Innumerable supra and infratentorial enhancing nodules are again noted throughout the brain parenchyma with multiple lesions appearing somewhat larger and with increased enhancement. No associated diffusion restriction. Unchanged differential    considerations including bacterial or fungal infection versus metastatic disease.   2.  Mild diffuse cerebral substance loss.    3.  Mild microangiopathic ischemic change versus demyelination or gliosis.      CT-NEEDLE BX-DEEP BONE   Final Result      CT GUIDED RIGHT ILIUM DEEP BONE BIOPSY. SAMPLES WERE SENT TO [facility] FOR ANALYSIS.      CT-PELVIS WITH   Final Result         1.  No significant interval change in the size of the RIGHT iliac muscle fluid collections   2.  Hyperdense RIGHT gluteal lesion moderately suspicious for pseudoaneurysm with adjacent hematoma.   3.  Changes in the RIGHT iliac bone, BILATERAL sacrum and LEFT iliac bone suspicious for osteomyelitis   4.  Changes at L5-S1 suspicious for discitis osteomyelitis      CT-PELVIS WITH   Final Result      1.  Residual or recurrent abscess within the right iliacus muscle measuring 2.5 x 1.8 cm. It slightly smaller than on 5/28/2019      2.  Interval removal of hardware from the iliac bones and sacrum      3.  Lytic change of the right iliac bone and the sacrum. This could be related to hardware versus osteomyelitis.      4.  Subacute fractures of the right ilium, sacrum, and there is lucency within the left iliac bone that is likely from hardware      MR-BRAIN-WITH & W/O   Final Result      1.  Interval progression of supra and infratentorial intra-axial nodules and associated edema. This short-term interval progression favors infection over neoplasm though both remain considerations.   2.  Mild atrophy      DX-PORTABLE FLUOROSCOPY < 1 HOUR   Final Result         Portable fluoroscopy utilized for 2 minutes.      DX-PELVIS-1 OR 2 VIEWS   Final Result      Status post hardware removal from the right SI joint      DX-CHEST-PORTABLE (1 VIEW)   Final Result      Interstitial prominence could be due to pulmonary edema or hypoventilatory change.      DX-CHEST-LIMITED (1 VIEW)   Final Result      LEFT basilar underinflation atelectasis or pneumonia      CT-DRAIN-HEMATOMA - SEROMA   Final Result      CT-guided RIGHT pelvic fluid collection aspiration, laboratory evaluation pending               Assessment/Plan  * Brain lesion- (present on admission)   Assessment & Plan    Right-sided craniotomy for resection of superficial mass  6/28/2019  Continue Isoniazid, Rifampin, Ethambutol, Pyrazinamide per ID, discussed with ID they have reached out to [facility] to discuss outpatient regimen  Continue Keppra for seizure prophylaxis, per ID restart steroids if s/o seizure     Granulomatous disease - (present on admission)   Assessment & Plan    Isoniazid, Rifampin, Ethambutol, Pyrazinamide     Sepsis (HCC)- (present on admission)   Assessment & Plan    This is sepsis (without associated acute organ dysfunction).    Source - Right Iliac and Gluteus medius abscess     Abscess of right iliac muscle and right gluteus medius muscle- (present on admission)   Assessment & Plan    CT guided pelvic aspiration 5/30/2019  Hardware removal, pelvis 6/5/2019  CT guided deep bone biopsy 6/19/2019  CT guided aspiration/biopsy of a R gluteal fluid collection/phlegmon 7/11/2019 - consistent with TB  Isoniazid, Rifampin, Ethambutol, Pyrazinamide  Levaquin added - duration per ID       Hypokalemia- (present on admission)   Assessment & Plan    Will recheck     Osteomyelitis (HCC)- (present on admission)   Assessment & Plan    Query  ID following     Pseudoaneurysm following procedure (HCC)- (present on admission)   Assessment & Plan      POD #3 s/p IR thrombin injection for thrombosed psedudoanerysm     Hypomagnesemia- (present on admission)   Assessment & Plan    Oral magnesium     Dysphagia- (present on admission)   Assessment & Plan    Dysphagia 3  Speech following     Essential hypertension- (present on admission)   Assessment & Plan    Continue Metoprolol, Losartan and Amlodipine as tolerated     COPD (chronic obstructive pulmonary disease) (HCC)- (present on admission)   Assessment & Plan    RT protocol  No s/o exacerbation     Non-severe protein-calorie malnutrition (HCC)- (present on admission)   Assessment &  Plan    Nutrition consult       History of DVT (deep vein thrombosis)- (present on admission)   Assessment & Plan    Hold Xarelto     RLS (restless legs syndrome)- (present on admission)   Assessment & Plan    Pramipexole     Chronic pain- (present on admission)   Assessment & Plan      Suspect opiates contributing to confusion - improving with titration  Continue supportive care       History of breast cancer- (present on admission)   Assessment & Plan    history left mastectomy and breast implant.            VTE prophylaxis: Heparin

## 2019-07-26 LAB
BACTERIA SPEC ANAEROBE CULT: NORMAL
BACTERIA TISS AEROBE CULT: NORMAL
BASOPHILS # BLD AUTO: 0.7 % (ref 0–1.8)
BASOPHILS # BLD: 0.03 K/UL (ref 0–0.12)
EOSINOPHIL # BLD AUTO: 0.27 K/UL (ref 0–0.51)
EOSINOPHIL NFR BLD: 6.1 % (ref 0–6.9)
ERYTHROCYTE [DISTWIDTH] IN BLOOD BY AUTOMATED COUNT: 52.9 FL (ref 35.9–50)
GRAM STN SPEC: NORMAL
HCT VFR BLD AUTO: 34.3 % (ref 37–47)
HGB BLD-MCNC: 11.1 G/DL (ref 12–16)
IMM GRANULOCYTES # BLD AUTO: 0.01 K/UL (ref 0–0.11)
IMM GRANULOCYTES NFR BLD AUTO: 0.2 % (ref 0–0.9)
LYMPHOCYTES # BLD AUTO: 1.32 K/UL (ref 1–4.8)
LYMPHOCYTES NFR BLD: 29.6 % (ref 22–41)
MCH RBC QN AUTO: 29.9 PG (ref 27–33)
MCHC RBC AUTO-ENTMCNC: 32.4 G/DL (ref 33.6–35)
MCV RBC AUTO: 92.5 FL (ref 81.4–97.8)
MONOCYTES # BLD AUTO: 0.59 K/UL (ref 0–0.85)
MONOCYTES NFR BLD AUTO: 13.2 % (ref 0–13.4)
NEUTROPHILS # BLD AUTO: 2.24 K/UL (ref 2–7.15)
NEUTROPHILS NFR BLD: 50.2 % (ref 44–72)
NRBC # BLD AUTO: 0 K/UL
NRBC BLD-RTO: 0 /100 WBC
PLATELET # BLD AUTO: 244 K/UL (ref 164–446)
PMV BLD AUTO: 9.1 FL (ref 9–12.9)
RBC # BLD AUTO: 3.71 M/UL (ref 4.2–5.4)
SIGNIFICANT IND 70042: NORMAL
SIGNIFICANT IND 70042: NORMAL
SITE SITE: NORMAL
SITE SITE: NORMAL
SOURCE SOURCE: NORMAL
SOURCE SOURCE: NORMAL
WBC # BLD AUTO: 4.5 K/UL (ref 4.8–10.8)

## 2019-07-26 PROCEDURE — A9270 NON-COVERED ITEM OR SERVICE: HCPCS | Performed by: FAMILY MEDICINE

## 2019-07-26 PROCEDURE — 700102 HCHG RX REV CODE 250 W/ 637 OVERRIDE(OP): Performed by: FAMILY MEDICINE

## 2019-07-26 PROCEDURE — 36415 COLL VENOUS BLD VENIPUNCTURE: CPT

## 2019-07-26 PROCEDURE — 700101 HCHG RX REV CODE 250: Performed by: FAMILY MEDICINE

## 2019-07-26 PROCEDURE — A9270 NON-COVERED ITEM OR SERVICE: HCPCS | Performed by: INTERNAL MEDICINE

## 2019-07-26 PROCEDURE — 85025 COMPLETE CBC W/AUTO DIFF WBC: CPT

## 2019-07-26 PROCEDURE — 700102 HCHG RX REV CODE 250 W/ 637 OVERRIDE(OP): Performed by: HOSPITALIST

## 2019-07-26 PROCEDURE — 770001 HCHG ROOM/CARE - MED/SURG/GYN PRIV*

## 2019-07-26 PROCEDURE — 700102 HCHG RX REV CODE 250 W/ 637 OVERRIDE(OP): Performed by: INTERNAL MEDICINE

## 2019-07-26 PROCEDURE — 99232 SBSQ HOSP IP/OBS MODERATE 35: CPT | Performed by: HOSPITALIST

## 2019-07-26 PROCEDURE — A9270 NON-COVERED ITEM OR SERVICE: HCPCS | Performed by: HOSPITALIST

## 2019-07-26 PROCEDURE — 99232 SBSQ HOSP IP/OBS MODERATE 35: CPT | Performed by: INTERNAL MEDICINE

## 2019-07-26 PROCEDURE — 700111 HCHG RX REV CODE 636 W/ 250 OVERRIDE (IP): Performed by: HOSPITALIST

## 2019-07-26 RX ADMIN — ISONIAZID 300 MG: 300 TABLET ORAL at 04:44

## 2019-07-26 RX ADMIN — ATORVASTATIN CALCIUM 10 MG: 10 TABLET, FILM COATED ORAL at 17:39

## 2019-07-26 RX ADMIN — AMLODIPINE BESYLATE 10 MG: 5 TABLET ORAL at 04:43

## 2019-07-26 RX ADMIN — GABAPENTIN 400 MG: 400 CAPSULE ORAL at 17:39

## 2019-07-26 RX ADMIN — LEVETIRACETAM 500 MG: 500 TABLET, FILM COATED ORAL at 04:44

## 2019-07-26 RX ADMIN — RIFAMPIN 600 MG: 300 CAPSULE ORAL at 04:42

## 2019-07-26 RX ADMIN — LOSARTAN POTASSIUM 75 MG: 50 TABLET ORAL at 04:43

## 2019-07-26 RX ADMIN — SUCRALFATE 1 G: 1 SUSPENSION ORAL at 04:39

## 2019-07-26 RX ADMIN — OMEPRAZOLE 20 MG: 20 CAPSULE, DELAYED RELEASE ORAL at 04:41

## 2019-07-26 RX ADMIN — TEMAZEPAM 15 MG: 15 CAPSULE ORAL at 21:31

## 2019-07-26 RX ADMIN — SUCRALFATE 1 G: 1 SUSPENSION ORAL at 17:39

## 2019-07-26 RX ADMIN — HEPARIN SODIUM 5000 UNITS: 5000 INJECTION, SOLUTION INTRAVENOUS; SUBCUTANEOUS at 04:41

## 2019-07-26 RX ADMIN — LEVETIRACETAM 500 MG: 500 TABLET, FILM COATED ORAL at 17:39

## 2019-07-26 RX ADMIN — LIDOCAINE 2 PATCH: 50 PATCH CUTANEOUS at 11:16

## 2019-07-26 RX ADMIN — PYRIDOXINE HCL TAB 50 MG 100 MG: 50 TAB at 04:41

## 2019-07-26 RX ADMIN — PRAMIPEXOLE DIHYDROCHLORIDE 0.25 MG: 0.5 TABLET ORAL at 11:21

## 2019-07-26 RX ADMIN — PYRAZINAMIDE 1000 MG: 500 TABLET ORAL at 04:41

## 2019-07-26 RX ADMIN — Medication 1 CAPSULE: at 04:41

## 2019-07-26 RX ADMIN — GABAPENTIN 400 MG: 400 CAPSULE ORAL at 11:21

## 2019-07-26 RX ADMIN — CINACALCET HYDROCHLORIDE 60 MG: 30 TABLET, COATED ORAL at 04:41

## 2019-07-26 RX ADMIN — SUCRALFATE 1 G: 1 SUSPENSION ORAL at 11:21

## 2019-07-26 RX ADMIN — LEVOFLOXACIN 750 MG: 750 TABLET, FILM COATED ORAL at 04:44

## 2019-07-26 RX ADMIN — HEPARIN SODIUM 5000 UNITS: 5000 INJECTION, SOLUTION INTRAVENOUS; SUBCUTANEOUS at 17:39

## 2019-07-26 RX ADMIN — METOPROLOL TARTRATE 25 MG: 25 TABLET, FILM COATED ORAL at 17:39

## 2019-07-26 RX ADMIN — GABAPENTIN 400 MG: 400 CAPSULE ORAL at 04:43

## 2019-07-26 RX ADMIN — OXYCODONE HYDROCHLORIDE 10 MG: 10 TABLET, FILM COATED, EXTENDED RELEASE ORAL at 04:39

## 2019-07-26 RX ADMIN — ETHAMBUTOL HYDROCHLORIDE 800 MG: 400 TABLET, FILM COATED ORAL at 04:39

## 2019-07-26 RX ADMIN — OXYCODONE HYDROCHLORIDE 10 MG: 10 TABLET, FILM COATED, EXTENDED RELEASE ORAL at 17:39

## 2019-07-26 RX ADMIN — POTASSIUM CHLORIDE 20 MEQ: 20 TABLET, EXTENDED RELEASE ORAL at 04:42

## 2019-07-26 RX ADMIN — METOPROLOL TARTRATE 25 MG: 25 TABLET, FILM COATED ORAL at 04:44

## 2019-07-26 RX ADMIN — PRAMIPEXOLE DIHYDROCHLORIDE 0.25 MG: 0.5 TABLET ORAL at 17:39

## 2019-07-26 RX ADMIN — PRAMIPEXOLE DIHYDROCHLORIDE 0.25 MG: 0.5 TABLET ORAL at 04:38

## 2019-07-26 RX ADMIN — FERROUS SULFATE TAB 325 MG (65 MG ELEMENTAL FE) 325 MG: 325 (65 FE) TAB at 04:43

## 2019-07-26 ASSESSMENT — ENCOUNTER SYMPTOMS
FEVER: 0
HEADACHES: 0
SENSORY CHANGE: 0
WEAKNESS: 0
DIARRHEA: 0
HEARTBURN: 0
NECK PAIN: 0
SHORTNESS OF BREATH: 0
BACK PAIN: 0
SPEECH CHANGE: 0
WHEEZING: 0
PALPITATIONS: 0
CHILLS: 0
ABDOMINAL PAIN: 0
NAUSEA: 0
VOMITING: 0
NERVOUS/ANXIOUS: 0
DIZZINESS: 0
SORE THROAT: 0
FOCAL WEAKNESS: 0
COUGH: 0
BLURRED VISION: 0

## 2019-07-26 NOTE — PROGRESS NOTES
Patient is resting in bed. Patient is A&Ox3, disoriented to situation. Encouraged patient to sit up in chair for meals and ambulate to bathroom. Bed alarm on, call light within reach. Hourly rounding.

## 2019-07-26 NOTE — DISCHARGE PLANNING
Agency/Facility Name: [facility]  Spoke To: Enrrique  Outcome: Patient declined- no open beds.

## 2019-07-26 NOTE — PROGRESS NOTES
[facility] Medicine Daily Progress Note    Date of Service  7/26/2019    Chief Complaint  68 y.o. female admitted 5/29/2019 with right iliac and gluteus abscess    Hospital Course  Patient is a 68 year old with history of breast cancer, copd, dm, gerd, dl and htn who was admitted with right iliac gluteus abscess.  She also has a known history of brain lesions with increasing enhancement noted on MRI and possible chronic osteomyelitis of the lumbar spine.    Interval Problem Update  She reports no pain this am  She is axox3 but somnolent  Would like to go to a snf in [place] near [facility]  No sob  Ros otherwise negative    Consultants/Specialty  ID  Neurosurgery    Code Status  Full    Disposition  snf pending    Review of Systems  Review of Systems   Constitutional: Negative for chills, fever and malaise/fatigue.   HENT: Negative for hearing loss and sore throat.    Eyes: Negative for blurred vision.   Respiratory: Negative for cough, shortness of breath and wheezing.    Cardiovascular: Negative for chest pain, palpitations and leg swelling.   Gastrointestinal: Negative for abdominal pain, diarrhea, heartburn, nausea and vomiting.   Genitourinary: Negative for dysuria.   Musculoskeletal: Negative for back pain, joint pain and neck pain.   Skin: Negative for rash.   Neurological: Negative for dizziness, sensory change, speech change, focal weakness, weakness and headaches.   Psychiatric/Behavioral: The patient is not nervous/anxious.         Physical Exam  Temp:  [36.1 °C (97 °F)-37.1 °C (98.7 °F)] 36.1 °C (97 °F)  Pulse:  [73-99] 73  Resp:  [16-18] 17  BP: (119-137)/(47-86) 121/75  SpO2:  [95 %-99 %] 95 %    Physical Exam   Constitutional: She appears well-developed and well-nourished. No distress.   HENT:   Head: Normocephalic and atraumatic.   Mouth/Throat: Oropharynx is clear and moist.   Surgical site cdi   Eyes: Pupils are equal, round, and reactive to light. Conjunctivae are normal.   Neck: Normal range of  motion. Neck supple.   Cardiovascular: Normal rate, regular rhythm, normal heart sounds and intact distal pulses.  Exam reveals no gallop and no friction rub.    No murmur heard.  Pulmonary/Chest: Effort normal and breath sounds normal. No respiratory distress. She has no wheezes. She has no rales. She exhibits no tenderness.   Abdominal: Soft. Bowel sounds are normal. She exhibits no distension and no mass. There is no tenderness. There is no rebound and no guarding.   Musculoskeletal: Normal range of motion. She exhibits no edema or tenderness.   Neurological: She is alert. She has normal reflexes. No cranial nerve deficit. She exhibits normal muscle tone. Coordination normal.   Skin: Skin is warm and dry. No rash noted. She is not diaphoretic. No erythema. No pallor.   Nursing note and vitals reviewed.      Fluids  No intake or output data in the 24 hours ending 07/26/19 1321    Laboratory  Recent Labs      07/24/19   0344  07/25/19   0400  07/26/19   0301   WBC  4.4*  4.1*  4.5*   RBC  3.42*  3.70*  3.71*   HEMOGLOBIN  9.9*  11.1*  11.1*   HEMATOCRIT  32.3*  34.5*  34.3*   MCV  94.4  93.2  92.5   MCH  28.9  30.0  29.9   MCHC  30.7*  32.2*  32.4*   RDW  54.6*  54.1*  52.9*   PLATELETCT  236  249  244   MPV  8.9*  9.2  9.1                       Imaging  US-EXTREMITY ARTERY LOWER UNILAT RIGHT   Final Result      Right gluteal pseudoaneurysm measuring 3.65 x 2.24 x 2.31 cm.      IR-INJ-EXT PSEUDOANEURYSM PERC   Final Result      1.  Ultrasound guided thrombin injection for treatment of a RIGHT gluteal 3.6 cm pseudoaneurysm.      CTA ABDOMEN PELVIS W & W/O POST PROCESS   Final Result      1.  Interval enlargement of a hyperdense ovoid masslike structure immediately posterior to the right ilium, possibly representing a pseudoaneurysm.   2.  Stable thin-walled apparent fluid-filled structure immediately deep to the right ilium.   3.   Minimal retroperitoneal adenopathy, grossly stable.   4.  Cholelithiasis.       MR-BRAIN-WITH & W/O   Final Result      1.  Innumerable nodular enhancing lesions throughout the brain parenchyma both the supra and infratentorial compartments predominantly near the gray-white junction. There has been interval increase in size of several of these lesions since previous exam.    These changes may be secondary to metastatic disease or granulomatous disease.      2.  Interval right parietal craniotomy with underlying elliptical postsurgical defect.      3.  Interval increase in size of index right thalamic lesion which has increased vasogenic edema since previous exam.      4.  Age-related cerebral atrophy.      IR-PICC LINE PLACEMENT W/ GUIDANCE > AGE 5   Final Result                  Ultrasound-guided PICC placement performed by qualified nursing staff as    above.          CT-NEEDLE BX-ABD-RETROPERITONL   Final Result      1.  CT GUIDED biopsy of a right pelvic phlegmon.      2. Significant interval enlargement of a right gluteal pseudoaneurysm. CTA and consideration for possible intervention either with direct thrombin injection or endovascular treatment was suggested. This was conveyed to Dr. Alatorre on 7/11/2019 via [place]    text at 1750 hours.      AO-STKCCAI-5 VIEW   Final Result         1.  Moderate stool in the colon suggests changes of constipation, otherwise nonspecific bowel gas pattern   2.  Atherosclerosis      US-EXTREMITY VENOUS LOWER BILAT   Final Result      IR-US GUIDED PIV   Final Result    Ultrasound-guided PERIPHERAL IV INSERTION performed by    qualified nursing staff as above.            MR-LUMBAR SPINE-WITH & W/O   Final Result         1.  Grade 2-3 spondylolisthesis at the L5-S1 level with bilateral spondylolysis of the pars interarticularis. This causes severe bilateral neural foraminal stenosis at this level.      2.  Interval removal of posterior fusion hardware at the lumbosacral junction.      3.  Diffuse osteomyelitis involving the L5 vertebral body and the first 3  sacral segments.      4.  Chronic discitis at the L5-S1 level with anterior paraspinous abscess at this level and anterior to the S1 sacral segment.      5.  Smaller intraosseous bone bone abscesses or areas of necrosis noted in the sacrum.      6.  There is also evidence of osteomyelitis involving the jose antonio and sacrum bilaterally along the course of the previous sacral fixation screw. There is a large 4 cm abscess noted in the right gluteal soft tissues in a smaller 3 cm chronic appearing    abscess in the left gluteal musculature.      7.  There is a 1.5 cm intraosseous abscess or area of necrosis in the right posterior ilium.      8.  There is a 3.3 cm centimeters multiloculated abscess anterior to the right sacroiliac joint.      MR-STEALTH BRAIN WITH & W/O   Final Result         1.  Innumerable subcentimeter brain metastases, many at the gray-white junction but also multiple noted throughout the basal ganglia and brainstem.      2.  Largest index lesion is noted in the right thalamus and has increased in size since previous exam and currently measures 9 mm and has a moderate amount of surrounding vasogenic edema.      US-EXTREMITY NON VASCULAR UNILATERAL RIGHT   Final Result      No right hip joint effusion. No soft tissue fluid collection.      MR-BRAIN-WITH & W/O   Final Result      1.  Innumerable supra and infratentorial enhancing nodules are again noted throughout the brain parenchyma with multiple lesions appearing somewhat larger and with increased enhancement. No associated diffusion restriction. Unchanged differential    considerations including bacterial or fungal infection versus metastatic disease.   2.  Mild diffuse cerebral substance loss.   3.  Mild microangiopathic ischemic change versus demyelination or gliosis.      CT-NEEDLE BX-DEEP BONE   Final Result      CT GUIDED RIGHT ILIUM DEEP BONE BIOPSY. SAMPLES WERE SENT TO [facility]CRO[facility] FOR ANALYSIS.      CT-PELVIS WITH   Final Result         1.  No  significant interval change in the size of the RIGHT iliac muscle fluid collections   2.  Hyperdense RIGHT gluteal lesion moderately suspicious for pseudoaneurysm with adjacent hematoma.   3.  Changes in the RIGHT iliac bone, BILATERAL sacrum and LEFT iliac bone suspicious for osteomyelitis   4.  Changes at L5-S1 suspicious for discitis osteomyelitis      CT-PELVIS WITH   Final Result      1.  Residual or recurrent abscess within the right iliacus muscle measuring 2.5 x 1.8 cm. It slightly smaller than on 5/28/2019      2.  Interval removal of hardware from the iliac bones and sacrum      3.  Lytic change of the right iliac bone and the sacrum. This could be related to hardware versus osteomyelitis.      4.  Subacute fractures of the right ilium, sacrum, and there is lucency within the left iliac bone that is likely from hardware      MR-BRAIN-WITH & W/O   Final Result      1.  Interval progression of supra and infratentorial intra-axial nodules and associated edema. This short-term interval progression favors infection over neoplasm though both remain considerations.   2.  Mild atrophy      DX-PORTABLE FLUOROSCOPY < 1 HOUR   Final Result         Portable fluoroscopy utilized for 2 minutes.      DX-PELVIS-1 OR 2 VIEWS   Final Result      Status post hardware removal from the right SI joint      DX-CHEST-PORTABLE (1 VIEW)   Final Result      Interstitial prominence could be due to pulmonary edema or hypoventilatory change.      DX-CHEST-LIMITED (1 VIEW)   Final Result      LEFT basilar underinflation atelectasis or pneumonia      CT-DRAIN-HEMATOMA - SEROMA   Final Result      CT-guided RIGHT pelvic fluid collection aspiration, laboratory evaluation pending              Assessment/Plan  * Brain lesion- (present on admission)   Assessment & Plan    Right-sided craniotomy for resection of superficial mass  6/28/2019  Continue Isoniazid, Rifampin, Ethambutol, Pyrazinamide per ID, discussed with ID they have reached out  to [facility] to discuss outpatient regimen  Continue Keppra for seizure prophylaxis, per ID restart steroids if s/o seizure     Granulomatous disease - (present on admission)   Assessment & Plan    Isoniazid, Rifampin, Ethambutol, Pyrazinamide     Sepsis (HCC)- (present on admission)   Assessment & Plan    This is sepsis (without associated acute organ dysfunction).    Source - Right Iliac and Gluteus medius abscess     Abscess of right iliac muscle and right gluteus medius muscle- (present on admission)   Assessment & Plan    CT guided pelvic aspiration 5/30/2019  Hardware removal, pelvis 6/5/2019  CT guided deep bone biopsy 6/19/2019  CT guided aspiration/biopsy of a R gluteal fluid collection/phlegmon 7/11/2019 - consistent with TB  Isoniazid, Rifampin, Ethambutol, Pyrazinamide  Levaquin added - duration per ID       Hypokalemia- (present on admission)   Assessment & Plan    Will recheck     Osteomyelitis (HCC)- (present on admission)   Assessment & Plan    Query  ID following     Pseudoaneurysm following procedure (HCC)- (present on admission)   Assessment & Plan      POD #3 s/p IR thrombin injection for thrombosed psedudoanerysm     Hypomagnesemia- (present on admission)   Assessment & Plan    Oral magnesium     Dysphagia- (present on admission)   Assessment & Plan    Dysphagia 3  Speech following     Essential hypertension- (present on admission)   Assessment & Plan    Continue Metoprolol, Losartan and Amlodipine as tolerated     COPD (chronic obstructive pulmonary disease) (HCC)- (present on admission)   Assessment & Plan    RT protocol  No s/o exacerbation     Non-severe protein-calorie malnutrition (HCC)- (present on admission)   Assessment & Plan    Nutrition consult       History of DVT (deep vein thrombosis)- (present on admission)   Assessment & Plan    Hold Xarelto     RLS (restless legs syndrome)- (present on admission)   Assessment & Plan    Pramipexole     Chronic pain- (present on admission)    Assessment & Plan      Suspect opiates contributing to confusion - improved, no pain today and somnolent so I will stop long acting opiates, continue taper and use short acting only       History of breast cancer- (present on admission)   Assessment & Plan    history left mastectomy and breast implant.            VTE prophylaxis: Heparin

## 2019-07-26 NOTE — CARE PLAN
Problem: Communication  Goal: The ability to communicate needs accurately and effectively will improve  Outcome: PROGRESSING SLOWER THAN EXPECTED  Patient lethargic this morning. POC discussed with patient.     Problem: Safety  Goal: Will remain free from falls  Outcome: PROGRESSING AS EXPECTED  Safety education provided.

## 2019-07-26 NOTE — PROGRESS NOTES
Patient slept well through the night. Confusion improved and patient was A/Ox3, disoriented to time. Patient not given any prn pain medication to see if mentation would clear, as requested from MD per day shift nurse. Scheduled pain medication given, pain tolerable for patient at a 2. Will continue to monitor.

## 2019-07-26 NOTE — PROGRESS NOTES
Infectious Disease Progress Note    Author: Karen Garcia M.D. Date & Time of service: 7/26/2019  3:09 PM    Chief Complaint:  Brain abscess, gluteal abscess  Vertebral OM     Interval History:  68 y.o. female admitted 5/29/2019 as a transfer from [facility] since 4/30/2019. + diabetes, SANTOSH of right hip with hardware, and breast cancer who was originally admitted to [facility] on 03/08/2019 for right hip and pelvic pain.  Work-up revealed a right iliopsoas lesion, gluteal abscess, and mult brain abscesses     7/4 AF much more alert and conversant today-c/o left hip pain, unrelenting and would like parietal dressing changed. HA at surgical site.  Denies SE abx. No new neurodeficits  7/5 afebrile WBC 5.4.  Patient sleeping but arousable.  She denies any headache, nausea, vomiting.  7/6 afebrile WBC 6.5.  Patient sitting up in chair and having excruciating back pain  7/8 Pt has no specific complaints, she is mildly confused today.  She seems to be tolerating her medications with no significant lab abnormalities.  7/9 AF, O2 RA, Significant left hip pain today.  She does appear to be tolerating her antibiotics.  Ultrasound of bilateral lower extremities has been ordered.  7/10 AF, O2 RA,  Plan for IR drainage of sacral collection soon.   7/11 AF, O2 RA,  Pt continued on RIPE and ampho.  She is tolerating well overall, requiring some mag and potassium replacement.  She is complaining of severe pain in left hip again today.  Plan for biopsy later today.   7/12  IR biopsy of R gluteal abscess/hip. AF, O2 2 L NC,   tolerating antibiotics so far.  She is quite somnolent today but per nursing she was oriented x4 earlier  7/14 AF, O2 RA, more alert today, conversant and asking for advance in diet. Left hip pain ongoing but improved.   7/15 afebrile WBC 4.4.  Patient's speech is somewhat muffled but she is alert and responding to questions appropriately.  MRI shows worsening lesions and amphotericin stopped.  Nurse reports  waxing and waning mental status   afebrile.  Patient sleeping but arousable.  She is answer questions appropriately but then forgets that she is in the hospital.   afebrile WBC 4.9 patient is very drowsy as she states she did not sleep well yesterday.  She is asking about whether or not she has a fungal infection.  Plan of care discussed with patient.   afebrile patient continues to only endorse left hip pain exacerbated by sitting up in the chair.  She sat in the chair for 30 minutes yesterday.  Getting out of bed is limited secondary to left hip pain.  Mental status remains about the same with intermittent waxing and waning.  No new issues today per RN   afebrile patient is much more awake and alert today.  She is answering questions appropriately.  Yesterday she was complaining of some abdominal pain so CT scan was done and revealed interval enlargement of a hyperdense ovoid masslike structure posterior to the right ilium concerning for pseudoaneurysm.  2019 no fevers.  No new issues overnight.  Had her ultrasound today follow the results  2019-no fevers.  Ongoing pain.  The AFB cultures have come back positive for TB  2019 no fevers.  No new issues overnight.  Very anxious and agitated  2019 no fevers.  No new issues.  Says she feels slightly better.   AF WBC 4.5 tolerating meds well-wants cream to decrease size of surgical scar-has right-sided rib pain and joint pain  Review of Systems:  Review of Systems   Constitutional: Negative for chills, fever and malaise/fatigue.   Respiratory: Negative for shortness of breath.    Cardiovascular:        Right rib pain   Gastrointestinal: Negative for abdominal pain, nausea and vomiting.   Musculoskeletal: Positive for joint pain.   Neurological: Negative for dizziness and headaches.       Hemodynamics:  Temp (24hrs), Av.6 °C (97.9 °F), Min:36.1 °C (97 °F), Max:37.1 °C (98.7 °F)  Temperature: 36.1 °C (97 °F)  Pulse  Av.2   Min: 49  Max: 195   Blood Pressure : 121/75       Physical Exam:  Physical Exam   Constitutional: No distress.   Elderly  Chronically ill-appearing   HENT:   Mouth/Throat: Oropharynx is clear and moist. No oropharyngeal exudate.   Right parietal surgical site healed   Eyes: Pupils are equal, round, and reactive to light. Conjunctivae and EOM are normal.   Neck: Neck supple. No JVD present.   Cardiovascular: Normal rate and regular rhythm.    Pulmonary/Chest: Effort normal. No stridor. No respiratory distress. She has no wheezes.   Abdominal: Soft. She exhibits no distension. There is no tenderness.   Musculoskeletal: She exhibits no edema.   Neurological: She is alert. No cranial nerve deficit.   Skin: Skin is warm. She is not diaphoretic.   Nursing note and vitals reviewed.      Meds:    Current Facility-Administered Medications:   •  levoFLOXacin  •  acetaminophen  •  hyoscyamine-maalox plus-lidocaine viscous  •  riFAMPin  •  thrombin  •  oxyCODONE immediate-release  •  oxyCODONE CR  •  temazepam  •  potassium chloride SA  •  losartan  •  pramipexole  •  senna-docusate  •  polyethylene glycol/lytes  •  magnesium hydroxide  •  gabapentin  •  ferrous sulfate  •  diphenhydrAMINE  •  heparin  •  pyridoxine  •  levETIRAcetam  •  isoniazid  •  ethambutol  •  pyrazinamide  •  Pharmacy Consult Request  •  labetalol  •  hydrALAZINE  •  lactobacillus rhamnosus  •  sucralfate  •  lidocaine  •  cyclobenzaprine  •  Respiratory Care per Protocol  •  amLODIPine  •  cinacalcet  •  atorvastatin  •  omeprazole  •  metoprolol  •  ondansetron  •  ondansetron    Labs:  Recent Labs      07/24/19   0344  07/25/19   0400  07/26/19   0301   WBC  4.4*  4.1*  4.5*   RBC  3.42*  3.70*  3.71*   HEMOGLOBIN  9.9*  11.1*  11.1*   HEMATOCRIT  32.3*  34.5*  34.3*   MCV  94.4  93.2  92.5   MCH  28.9  30.0  29.9   RDW  54.6*  54.1*  52.9*   PLATELETCT  236  249  244   MPV  8.9*  9.2  9.1   NEUTSPOLYS  51.30  52.10  50.20   LYMPHOCYTES  28.50  27.00   29.60   MONOCYTES  12.40  13.50*  13.20   EOSINOPHILS  6.60  5.90  6.10   BASOPHILS  0.70  1.00  0.70     No results for input(s): SODIUM, POTASSIUM, CHLORIDE, CO2, GLUCOSE, BUN, CPKTOTAL in the last 72 hours.  No results for input(s): ALBUMIN, TBILIRUBIN, ALKPHOSPHAT, TOTPROTEIN, ALTSGPT, ASTSGOT, CREATININE in the last 72 hours.    Imaging:  MRI on 7/14/2019  Impression:       1.  Innumerable nodular enhancing lesions throughout the brain parenchyma both the supra and infratentorial compartments predominantly near the gray-white junction. There has been interval increase in size of several of these lesions since previous exam.   These changes may be secondary to metastatic disease or granulomatous disease.    2.  Interval right parietal craniotomy with underlying elliptical postsurgical defect.    3.  Interval increase in size of index right thalamic lesion which has increased vasogenic edema since previous exam.    4.  Age-related cerebral atrophy.       Micro:  Results     Procedure Component Value Units Date/Time    Anaerobic Culture [[id number]] Collected:  07/11/19 1745    Order Status:  Completed Specimen:  Tissue Updated:  07/26/19 1017     Significant Indicator NEG     Source TISS     Site Right Hip Cores     Culture Result No Anaerobes isolated.    Narrative:       Radiology specimen Hold specimen 14 days per Dr. Junior    CULTURE TISSUE W/ GRM STAIN [[id number]] Collected:  07/11/19 1745    Order Status:  Completed Specimen:  Tissue Updated:  07/26/19 1017     Significant Indicator NEG     Source TISS     Site Right Hip Cores     Culture Result No growth at 14 days.     Gram Stain Result No organisms seen.    Narrative:       Radiology specimen Hold specimen 14 days per Dr. Junior    Fungal Culture [[id number]] Collected:  06/28/19 1403    Order Status:  Completed Specimen:  Tissue Updated:  07/23/19 0848     Significant Indicator NEG     Source TISS     Site Right Parietal Lesion     Culture Result No  fungal growth.    Narrative:       Surgery Specimen    Anaerobic Culture [[id number]] Collected:  06/28/19 1403    Order Status:  Completed Specimen:  Tissue Updated:  07/23/19 0848     Significant Indicator NEG     Source TISS     Site Right Parietal Lesion     Culture Result No Anaerobes isolated.    Narrative:       Surgery Specimen    CULTURE TISSUE W/ GRM STAIN [[id number]] Collected:  06/28/19 1403    Order Status:  Completed Specimen:  Tissue Updated:  07/23/19 0848     Significant Indicator NEG     Source TISS     Site Right Parietal Lesion     Culture Result No growth at 72 hours.     Gram Stain Result Rare WBCs.  No organisms seen.      Narrative:       Surgery Specimen    AFB Culture [[id number]] Collected:  06/28/19 1403    Order Status:  Completed Specimen:  Tissue Updated:  07/23/19 0848     Significant Indicator NEG     Source TISS     Site Right Parietal Lesion     Culture Result Culture in progress.     AFB Smear Results No acid fast bacilli seen.    Narrative:       Surgery Specimen    Fungal Culture [[id number]] Collected:  06/19/19 0930    Order Status:  Completed Specimen:  Tissue Updated:  07/22/19 1949     Significant Indicator NEG     Source TISS     Site Right Hip deep bone     Culture Result No fungal growth.    Narrative:       CALL  [facility]  TEREZA tel. [phone],  Collected By:[id number] BEATRICE HARRIS  Bone biopsy right hip  Collected By:[id number] BEATRICE HARRIS  Right hip fluid collection  Collected By:[id number] BEATRICE HARRIS    AFB Culture [[id number]]  (Abnormal) Collected:  06/19/19 0930    Order Status:  Completed Specimen:  Tissue from Other Body Fluid Updated:  07/22/19 1949     Significant Indicator POS (POS)     Source TISS     Site Right Hip deep bone     Culture Result Acid fast bacilli detected by MGIT 960 instrument.  Identification to follow.   (A)     AFB Smear Results No acid fast bacilli seen.     Culture Result Mycobacterium tuberculosis complex  By DNA probe  Positive for M. tb  "complex  Negative for M. avium complex  Negative for M. gordonae  Negative for M. kansasii  Testing performed at:  [facility]  [address]  [place] [address]  [phone]   (A)    Narrative:       CALL  [facility]  TEREZA tel. [phone],  Collected By:[id number] BEATRICE HARRIS  Bone biopsy right hip  Collected By:[id number] BEATRICE HARRIS  Right hip fluid collection  Collected By:[id number] BEATRICE HARRIS    Fungal Smear [[id number]] Collected:  06/19/19 0930    Order Status:  Completed Specimen:  Tissue from Other Body Fluid Updated:  07/22/19 1949     Significant Indicator NEG     Source TISS     Site Right Hip deep bone     Fungal Smear Results No fungal elements seen.    Narrative:       CALL  [facility]  TEREZA tel. [phone],  Collected By:[id number] BEATRICE HARRIS  Bone biopsy right hip  Collected By:[id number] BEATRICE HARRIS  Right hip fluid collection  Collected By:[id number] BEATRICE HARRIS    CULTURE TISSUE W/ GRM STAIN [[id number]] Collected:  06/19/19 0930    Order Status:  Completed Specimen:  Tissue Updated:  07/22/19 1949     Significant Indicator NEG     Source TISS     Site Right Hip deep bone     Culture Result No growth at 72 hours.     Gram Stain Result No organisms seen.    Narrative:       CALL  [facility]  TEREZA tel. [phone],  Collected By:[id number] BEATRICE HARRIS  Bone biopsy right hip  Collected By:[id number] BEATRICE HARRIS  Right hip fluid collection  Collected By:[id number] BEATRICE HARRIS          Assessment:  Active Hospital Problems    Diagnosis   • *Brain lesion [G93.9]   • Granulomatous disease  [L92.9]   • Abscess of right iliac muscle and right gluteus medius muscle [L02.91]   • Pseudoaneurysm following procedure (HCC) [I97.89, I72.9]   • Sepsis (HCC) [A41.9]   • History of breast cancer [Z85.3]       Assessment/Plan:     Brain lesions, worse on MRI  Tubercular  Encephalopathy, intermittent waxing and waning  Afebrile  MRI 4/23 \"supra and infratentorial brain parenchyma. Decrease in the size of the " "lesions. Interval reduction in the extent of the surrounding white matter edema consistent with improvement/treatment response of the most of the multifocal brain abscesses.  MRI 5/21 showed innumerable microabscesses vs mets  Abx (vancomycin, cefepime and Flagyl) discontinued on 5/23 after ~8 weeks of treatment-developed new fevers on 6/4  MRI on 6/8 with interval progression of supra and infratentorial intra--axial nodules and associated edema.  New right thalamus lesion. Radiology favors infection over neoplasm though both remain considerations (had been off abx)  Mult blood cultures neg  CHAS neg 3/15 and 5/24  MRI 6/22 worsening CNS lesions  S/p right craniotomy and resection of mass with biopsy on 6/28. Biopsy-per note fungal appearing elements seen per Neurosurgery-path   +necrotizing granulomas. All stains negative in EPIC  Fungal culture- negative to date  Bacterial cultures-negative to date  Brucella ab - negative  Coxiella ab - negative   Histo ab serum & antigen urine-negative  Blasto ab - negative   Repeat MRI on 7/14 + increase in in nodular enhancing lesions throughout the brain parenchyma  Continue RIPE+B6  Follow-up brain biopsy specimen sent to [facility] for PCR testing, bacterial, fungal, AFB   Follow-up serologic testing sent out to [facility]     Gluteal and iliopsoas abscess   OM L5, S1-3, new this admission/skeletal TB  Recurrent abscesses  Adjacent to hardware in right hip (placed 12/17/18)  CT 4/23 \"Fluid collections within the right gluteal and iliacis muscles.. The collection within the right gluteal muscle has unchanged while the fluid collection within the right iliacis muscle is increased somewhat in size.\" 2.7 cm  Cultures 3/29 and 4/4 neg  MRI on 5/20/2019 - interval decrease in collection in R gluteal muscle and persistent multiloculated collection in R iliacus muscle.   CT 5/28 no change  s/p drainage on 5/30/2019-cultures negative  S/p removal hardware 6/5-no cultures " done  CT on 6/8 with residual abscess in the right iliacus muscle, 2.5 x 1.8 cm, slightly smaller than prior  s/p CT-guided deep bone biopsy 6/19/2019.  Cx +Mtb  MRI 6/28 chronic discitis, diffuse OM L5, 3 and 4 cm abscesses right glute, 1.5 cm abscess or necrosis right posterior ileum, 3.3 cm abscess right SI joint  Continue TB therapy as above  At the suggestion of [facility] added Levaquin     Right gluteal pseudoaneurysm  significant interval enlargement noted by IR during procedure on 7/11  CTA from 7/19/2019 shows interval enlargement of hypodense ovoid masslike structure  Consider vascular evaluation     +QuantiGold  Path with necrotizing granulomas  AFB stain neg  AFB cultures + 6/19  Started RIPE +B6 7/3  Monitor for visual changes while on ethambutol.    Ophthalmology evaluation for baseline vision as well as color vision testing when able     Type 2 DM  Hemoglobin A1c 6.3   Keep BS under 150 to help control current infection      Discussed with IM RN

## 2019-07-26 NOTE — DISCHARGE PLANNING
Anticipated Discharge Disposition: SNF    Action: LSW requested that Florinda CANO f/u on outstanding SNF referrals.    Barriers to Discharge: SNF Acceptance    Plan: LSW to f/u with [facility]

## 2019-07-26 NOTE — DISCHARGE PLANNING
Agency/Facility Name: [facility]  Outcome: Attempted to follow up on status of referral, however, no answer. Voicemail left for Enr[facility].    Agency/Facility Name: [facility]  Spoke To: Admissions  Outcome: Patient declined- no open beds.    Agency/Facility Name: [facility]  Spoke To: Admissions  Outcome: Referral not received. Verified fax number and resent referral.    Agency/Facility Name: [facility]  Spoke To: Fax Response  Outcome: Patient declined- no open beds.    Agency/Facility Name: [facility]  Outcome: Attempted to follow up on status of referral, however, no answer. Voicemail left.

## 2019-07-27 PROCEDURE — A9270 NON-COVERED ITEM OR SERVICE: HCPCS | Performed by: FAMILY MEDICINE

## 2019-07-27 PROCEDURE — 700102 HCHG RX REV CODE 250 W/ 637 OVERRIDE(OP): Performed by: INTERNAL MEDICINE

## 2019-07-27 PROCEDURE — A9270 NON-COVERED ITEM OR SERVICE: HCPCS | Performed by: INTERNAL MEDICINE

## 2019-07-27 PROCEDURE — A9270 NON-COVERED ITEM OR SERVICE: HCPCS | Performed by: HOSPITALIST

## 2019-07-27 PROCEDURE — 700102 HCHG RX REV CODE 250 W/ 637 OVERRIDE(OP): Performed by: HOSPITALIST

## 2019-07-27 PROCEDURE — 700102 HCHG RX REV CODE 250 W/ 637 OVERRIDE(OP): Performed by: FAMILY MEDICINE

## 2019-07-27 PROCEDURE — 700111 HCHG RX REV CODE 636 W/ 250 OVERRIDE (IP): Performed by: HOSPITALIST

## 2019-07-27 PROCEDURE — 99232 SBSQ HOSP IP/OBS MODERATE 35: CPT | Performed by: HOSPITALIST

## 2019-07-27 PROCEDURE — 99232 SBSQ HOSP IP/OBS MODERATE 35: CPT | Performed by: INTERNAL MEDICINE

## 2019-07-27 PROCEDURE — 770001 HCHG ROOM/CARE - MED/SURG/GYN PRIV*

## 2019-07-27 PROCEDURE — 700101 HCHG RX REV CODE 250: Performed by: FAMILY MEDICINE

## 2019-07-27 RX ADMIN — LEVETIRACETAM 500 MG: 500 TABLET, FILM COATED ORAL at 04:06

## 2019-07-27 RX ADMIN — OXYCODONE HYDROCHLORIDE 10 MG: 10 TABLET, FILM COATED, EXTENDED RELEASE ORAL at 04:06

## 2019-07-27 RX ADMIN — SUCRALFATE 1 G: 1 SUSPENSION ORAL at 04:06

## 2019-07-27 RX ADMIN — METOPROLOL TARTRATE 25 MG: 25 TABLET, FILM COATED ORAL at 04:16

## 2019-07-27 RX ADMIN — LOSARTAN POTASSIUM 75 MG: 50 TABLET ORAL at 04:17

## 2019-07-27 RX ADMIN — ISONIAZID 300 MG: 300 TABLET ORAL at 04:06

## 2019-07-27 RX ADMIN — CYCLOBENZAPRINE 10 MG: 10 TABLET, FILM COATED ORAL at 20:45

## 2019-07-27 RX ADMIN — RIFAMPIN 600 MG: 300 CAPSULE ORAL at 04:07

## 2019-07-27 RX ADMIN — PRAMIPEXOLE DIHYDROCHLORIDE 0.25 MG: 0.5 TABLET ORAL at 04:06

## 2019-07-27 RX ADMIN — POTASSIUM CHLORIDE 20 MEQ: 20 TABLET, EXTENDED RELEASE ORAL at 04:06

## 2019-07-27 RX ADMIN — ETHAMBUTOL HYDROCHLORIDE 800 MG: 400 TABLET, FILM COATED ORAL at 04:06

## 2019-07-27 RX ADMIN — SUCRALFATE 1 G: 1 SUSPENSION ORAL at 17:37

## 2019-07-27 RX ADMIN — PYRIDOXINE HCL TAB 50 MG 100 MG: 50 TAB at 04:07

## 2019-07-27 RX ADMIN — HEPARIN SODIUM 5000 UNITS: 5000 INJECTION, SOLUTION INTRAVENOUS; SUBCUTANEOUS at 17:37

## 2019-07-27 RX ADMIN — GABAPENTIN 400 MG: 400 CAPSULE ORAL at 04:07

## 2019-07-27 RX ADMIN — TEMAZEPAM 15 MG: 15 CAPSULE ORAL at 20:45

## 2019-07-27 RX ADMIN — SENNOSIDES, DOCUSATE SODIUM 2 TABLET: 50; 8.6 TABLET, FILM COATED ORAL at 04:05

## 2019-07-27 RX ADMIN — LEVOFLOXACIN 750 MG: 750 TABLET, FILM COATED ORAL at 04:05

## 2019-07-27 RX ADMIN — Medication 1 CAPSULE: at 04:05

## 2019-07-27 RX ADMIN — SUCRALFATE 1 G: 1 SUSPENSION ORAL at 10:02

## 2019-07-27 RX ADMIN — GABAPENTIN 400 MG: 400 CAPSULE ORAL at 10:02

## 2019-07-27 RX ADMIN — LIDOCAINE 2 PATCH: 50 PATCH CUTANEOUS at 10:02

## 2019-07-27 RX ADMIN — CINACALCET HYDROCHLORIDE 60 MG: 30 TABLET, COATED ORAL at 04:08

## 2019-07-27 RX ADMIN — OXYCODONE HYDROCHLORIDE 10 MG: 10 TABLET, FILM COATED, EXTENDED RELEASE ORAL at 17:37

## 2019-07-27 RX ADMIN — METOPROLOL TARTRATE 25 MG: 25 TABLET, FILM COATED ORAL at 17:37

## 2019-07-27 RX ADMIN — AMLODIPINE BESYLATE 10 MG: 5 TABLET ORAL at 04:17

## 2019-07-27 RX ADMIN — GABAPENTIN 400 MG: 400 CAPSULE ORAL at 17:37

## 2019-07-27 RX ADMIN — ATORVASTATIN CALCIUM 10 MG: 10 TABLET, FILM COATED ORAL at 17:37

## 2019-07-27 RX ADMIN — PRAMIPEXOLE DIHYDROCHLORIDE 0.25 MG: 0.5 TABLET ORAL at 17:37

## 2019-07-27 RX ADMIN — HEPARIN SODIUM 5000 UNITS: 5000 INJECTION, SOLUTION INTRAVENOUS; SUBCUTANEOUS at 04:07

## 2019-07-27 RX ADMIN — PYRAZINAMIDE 1000 MG: 500 TABLET ORAL at 04:06

## 2019-07-27 RX ADMIN — PRAMIPEXOLE DIHYDROCHLORIDE 0.25 MG: 0.5 TABLET ORAL at 10:02

## 2019-07-27 RX ADMIN — LEVETIRACETAM 500 MG: 500 TABLET, FILM COATED ORAL at 17:37

## 2019-07-27 RX ADMIN — FERROUS SULFATE TAB 325 MG (65 MG ELEMENTAL FE) 325 MG: 325 (65 FE) TAB at 04:08

## 2019-07-27 RX ADMIN — OMEPRAZOLE 20 MG: 20 CAPSULE, DELAYED RELEASE ORAL at 04:08

## 2019-07-27 ASSESSMENT — ENCOUNTER SYMPTOMS
SORE THROAT: 0
NAUSEA: 0
ABDOMINAL PAIN: 0
FOCAL WEAKNESS: 0
SENSORY CHANGE: 0
NERVOUS/ANXIOUS: 0
SHORTNESS OF BREATH: 0
PALPITATIONS: 0
BACK PAIN: 0
WHEEZING: 0
CHILLS: 0
HEARTBURN: 0
NECK PAIN: 0
SPEECH CHANGE: 0
FEVER: 0
HEADACHES: 0
VOMITING: 0
WEAKNESS: 0
COUGH: 0
DIZZINESS: 0
BLURRED VISION: 0
DIARRHEA: 0

## 2019-07-27 NOTE — PROGRESS NOTES
Infectious Disease Progress Note    Author: Karen Garcia M.D. Date & Time of service: 7/27/2019  1:48 PM    Chief Complaint:  Brain abscess, gluteal abscess  Vertebral OM     Interval History:  68 y.o. female admitted 5/29/2019 as a transfer from [facility] since 4/30/2019. + diabetes, SANTOSH of right hip with hardware, and breast cancer who was originally admitted to [facility] on 03/08/2019 for right hip and pelvic pain.  Work-up revealed a right iliopsoas lesion, gluteal abscess, and mult brain abscesses     7/4 AF much more alert and conversant today-c/o left hip pain, unrelenting and would like parietal dressing changed. HA at surgical site.  Denies SE abx. No new neurodeficits  7/5 afebrile WBC 5.4.  Patient sleeping but arousable.  She denies any headache, nausea, vomiting.  7/6 afebrile WBC 6.5.  Patient sitting up in chair and having excruciating back pain  7/8 Pt has no specific complaints, she is mildly confused today.  She seems to be tolerating her medications with no significant lab abnormalities.  7/9 AF, O2 RA, Significant left hip pain today.  She does appear to be tolerating her antibiotics.  Ultrasound of bilateral lower extremities has been ordered.  7/10 AF, O2 RA,  Plan for IR drainage of sacral collection soon.   7/11 AF, O2 RA,  Pt continued on RIPE and ampho.  She is tolerating well overall, requiring some mag and potassium replacement.  She is complaining of severe pain in left hip again today.  Plan for biopsy later today.   7/12  IR biopsy of R gluteal abscess/hip. AF, O2 2 L NC,   tolerating antibiotics so far.  She is quite somnolent today but per nursing she was oriented x4 earlier  7/14 AF, O2 RA, more alert today, conversant and asking for advance in diet. Left hip pain ongoing but improved.   7/15 afebrile WBC 4.4.  Patient's speech is somewhat muffled but she is alert and responding to questions appropriately.  MRI shows worsening lesions and amphotericin stopped.  Nurse reports  waxing and waning mental status   afebrile.  Patient sleeping but arousable.  She is answer questions appropriately but then forgets that she is in the hospital.   afebrile WBC 4.9 patient is very drowsy as she states she did not sleep well yesterday.  She is asking about whether or not she has a fungal infection.  Plan of care discussed with patient.   afebrile patient continues to only endorse left hip pain exacerbated by sitting up in the chair.  She sat in the chair for 30 minutes yesterday.  Getting out of bed is limited secondary to left hip pain.  Mental status remains about the same with intermittent waxing and waning.  No new issues today per RN   afebrile patient is much more awake and alert today.  She is answering questions appropriately.  Yesterday she was complaining of some abdominal pain so CT scan was done and revealed interval enlargement of a hyperdense ovoid masslike structure posterior to the right ilium concerning for pseudoaneurysm.  2019 no fevers.  No new issues overnight.  Had her ultrasound today follow the results  2019-no fevers.  Ongoing pain.  The AFB cultures have come back positive for TB  2019 no fevers.  No new issues overnight.  Very anxious and agitated  2019 no fevers.  No new issues.  Says she feels slightly better.   AF WBC 4.5 tolerating meds well-wants cream to decrease size of surgical scar-has right-sided rib pain and joint pain   AF ambulating with assist with walker-no new complaints      Review of Systems:  Review of Systems   Constitutional: Negative for chills, fever and malaise/fatigue.   Respiratory: Negative for shortness of breath.    Cardiovascular:        Right rib pain   Gastrointestinal: Negative for abdominal pain, nausea and vomiting.   Musculoskeletal: Positive for joint pain.   Neurological: Negative for dizziness and headaches.       Hemodynamics:  Temp (24hrs), Av.3 °C (97.4 °F), Min:36.1 °C (97 °F),  Max:36.6 °C (97.8 °F)  Temperature: 36.6 °C (97.8 °F)  Pulse  Av.1  Min: 49  Max: 195   Blood Pressure : 159/89       Physical Exam:  Physical Exam   Constitutional: No distress.   Elderly  Chronically ill-appearing   HENT:   Mouth/Throat: Oropharynx is clear and moist. No oropharyngeal exudate.   Right parietal surgical site healed   Eyes: Pupils are equal, round, and reactive to light. Conjunctivae and EOM are normal.   Neck: Neck supple. No JVD present.   Cardiovascular: Normal rate and regular rhythm.    Pulmonary/Chest: Effort normal. No stridor. No respiratory distress. She has no wheezes.   Abdominal: Soft. She exhibits no distension. There is no tenderness.   Musculoskeletal: She exhibits no edema.   Neurological: She is alert. No cranial nerve deficit.   Skin: Skin is warm. She is not diaphoretic.   Nursing note and vitals reviewed.      Meds:    Current Facility-Administered Medications:   •  levoFLOXacin  •  acetaminophen  •  hyoscyamine-maalox plus-lidocaine viscous  •  riFAMPin  •  thrombin  •  oxyCODONE immediate-release  •  oxyCODONE CR  •  temazepam  •  potassium chloride SA  •  losartan  •  pramipexole  •  senna-docusate  •  polyethylene glycol/lytes  •  magnesium hydroxide  •  gabapentin  •  ferrous sulfate  •  diphenhydrAMINE  •  heparin  •  pyridoxine  •  levETIRAcetam  •  isoniazid  •  ethambutol  •  pyrazinamide  •  Pharmacy Consult Request  •  labetalol  •  hydrALAZINE  •  lactobacillus rhamnosus  •  sucralfate  •  lidocaine  •  cyclobenzaprine  •  Respiratory Care per Protocol  •  amLODIPine  •  cinacalcet  •  atorvastatin  •  omeprazole  •  metoprolol  •  ondansetron  •  ondansetron    Labs:  Recent Labs      19   0400  19   0301   WBC  4.1*  4.5*   RBC  3.70*  3.71*   HEMOGLOBIN  11.1*  11.1*   HEMATOCRIT  34.5*  34.3*   MCV  93.2  92.5   MCH  30.0  29.9   RDW  54.1*  52.9*   PLATELETCT  249  244   MPV  9.2  9.1   NEUTSPOLYS  52.10  50.20   LYMPHOCYTES  27.00  29.60    MONOCYTES  13.50*  13.20   EOSINOPHILS  5.90  6.10   BASOPHILS  1.00  0.70     No results for input(s): SODIUM, POTASSIUM, CHLORIDE, CO2, GLUCOSE, BUN, CPKTOTAL in the last 72 hours.  No results for input(s): ALBUMIN, TBILIRUBIN, ALKPHOSPHAT, TOTPROTEIN, ALTSGPT, ASTSGOT, CREATININE in the last 72 hours.    Imaging:  MRI on 7/14/2019  Impression:       1.  Innumerable nodular enhancing lesions throughout the brain parenchyma both the supra and infratentorial compartments predominantly near the gray-white junction. There has been interval increase in size of several of these lesions since previous exam.   These changes may be secondary to metastatic disease or granulomatous disease.    2.  Interval right parietal craniotomy with underlying elliptical postsurgical defect.    3.  Interval increase in size of index right thalamic lesion which has increased vasogenic edema since previous exam.    4.  Age-related cerebral atrophy.       Micro:  Results     Procedure Component Value Units Date/Time    Anaerobic Culture [[id number]] Collected:  07/11/19 1745    Order Status:  Completed Specimen:  Tissue Updated:  07/26/19 1017     Significant Indicator NEG     Source TISS     Site Right Hip Cores     Culture Result No Anaerobes isolated.    Narrative:       Radiology specimen Hold specimen 14 days per Dr. Junior    CULTURE TISSUE W/ GRM STAIN [[id number]] Collected:  07/11/19 1745    Order Status:  Completed Specimen:  Tissue Updated:  07/26/19 1017     Significant Indicator NEG     Source TISS     Site Right Hip Cores     Culture Result No growth at 14 days.     Gram Stain Result No organisms seen.    Narrative:       Radiology specimen Hold specimen 14 days per Dr. Junior    Fungal Culture [[id number]] Collected:  06/28/19 1403    Order Status:  Completed Specimen:  Tissue Updated:  07/23/19 0848     Significant Indicator NEG     Source TISS     Site Right Parietal Lesion     Culture Result No fungal growth.     Narrative:       Surgery Specimen    Anaerobic Culture [[id number]] Collected:  06/28/19 1403    Order Status:  Completed Specimen:  Tissue Updated:  07/23/19 0848     Significant Indicator NEG     Source TISS     Site Right Parietal Lesion     Culture Result No Anaerobes isolated.    Narrative:       Surgery Specimen    CULTURE TISSUE W/ GRM STAIN [[id number]] Collected:  06/28/19 1403    Order Status:  Completed Specimen:  Tissue Updated:  07/23/19 0848     Significant Indicator NEG     Source TISS     Site Right Parietal Lesion     Culture Result No growth at 72 hours.     Gram Stain Result Rare WBCs.  No organisms seen.      Narrative:       Surgery Specimen    AFB Culture [[id number]] Collected:  06/28/19 1403    Order Status:  Completed Specimen:  Tissue Updated:  07/23/19 0848     Significant Indicator NEG     Source TISS     Site Right Parietal Lesion     Culture Result Culture in progress.     AFB Smear Results No acid fast bacilli seen.    Narrative:       Surgery Specimen    Fungal Culture [[id number]] Collected:  06/19/19 0930    Order Status:  Completed Specimen:  Tissue Updated:  07/22/19 1949     Significant Indicator NEG     Source TISS     Site Right Hip deep bone     Culture Result No fungal growth.    Narrative:       CALL  [facility]  TEREZA tel. [phone],  Collected By:[id number] BEATRICE HARRIS  Bone biopsy right hip  Collected By:[id number] BEATRICE HARRIS  Right hip fluid collection  Collected By:[id number] BEATRICE HARRIS    AFB Culture [[id number]]  (Abnormal) Collected:  06/19/19 0930    Order Status:  Completed Specimen:  Tissue from Other Body Fluid Updated:  07/22/19 1949     Significant Indicator POS (POS)     Source TISS     Site Right Hip deep bone     Culture Result Acid fast bacilli detected by MGIT 960 instrument.  Identification to follow.   (A)     AFB Smear Results No acid fast bacilli seen.     Culture Result Mycobacterium tuberculosis complex  By DNA probe  Positive for M. tb complex  Negative for M.  "avium complex  Negative for M. gordonae  Negative for M. kansasii  Testing performed at:  [facility]  [address]  [place] [phone]   (A)    Narrative:       CALL  [facility]  TEREZA tel. [phone],  Collected By:[id number] BEATRICE HARRIS  Bone biopsy right hip  Collected By:[id number] BEATRICE HARRIS  Right hip fluid collection  Collected By:[id number] BEATRICE HARRIS    Fungal Smear [[id number]] Collected:  06/19/19 0930    Order Status:  Completed Specimen:  Tissue from Other Body Fluid Updated:  07/22/19 1949     Significant Indicator NEG     Source TISS     Site Right Hip deep bone     Fungal Smear Results No fungal elements seen.    Narrative:       CALL  [facility]  TEREZA tel. [phone],  Collected By:[id number] BEATRICE HARRIS  Bone biopsy right hip  Collected By:[id number] BEATRICE HARRIS  Right hip fluid collection  Collected By:[id number] BEATRICE HARRIS    CULTURE TISSUE W/ GRM STAIN [[id number]] Collected:  06/19/19 0930    Order Status:  Completed Specimen:  Tissue Updated:  07/22/19 1949     Significant Indicator NEG     Source TISS     Site Right Hip deep bone     Culture Result No growth at 72 hours.     Gram Stain Result No organisms seen.    Narrative:       CALL  [facility]  TEREZA tel. [phone],  Collected By:[id number] BEATRICE HARRIS  Bone biopsy right hip  Collected By:[id number] BEATRICE HARRIS  Right hip fluid collection  Collected By:[id number] BEATRICE HARRIS          Assessment:  Active Hospital Problems    Diagnosis   • *Brain lesion [G93.9]   • Granulomatous disease  [L92.9]   • Abscess of right iliac muscle and right gluteus medius muscle [L02.91]   • Pseudoaneurysm following procedure (HCC) [I97.89, I72.9]   • Sepsis (HCC) [A41.9]   • History of breast cancer [Z85.3]       Assessment/Plan:   Brain lesions  Tubercular  Encephalopathy, intermittent waxing and waning  MRI 4/23 \"supra and infratentorial brain parenchyma. Decrease in the size of the lesions. Interval reduction in the extent of the " "surrounding white matter edema consistent with improvement/treatment response of the most of the multifocal brain abscesses.  MRI 5/21 showed innumerable microabscesses vs mets  MRI on 6/8 with interval progression of supra and infratentorial intra--axial nodules and associated edema.  New right thalamus lesion. Radiology favors infection over neoplasm though both remain considerations (had been off abx)  Mult blood cultures neg  CHAS neg 3/15 and 5/24  MRI 6/22 worsening CNS lesions  S/p right craniotomy and resection of mass with biopsy on 6/28. Biopsy-per note fungal appearing elements seen per Neurosurgery-path   +necrotizing granulomas. 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 The collection within the right gluteal muscle has unchanged while the fluid collection within the right iliacis muscle is increased somewhat in size.\" 2.7 cm  Cultures 3/29 and 4/4 neg  MRI on 5/20/2019 - interval decrease in collection in R gluteal muscle and persistent multiloculated collection in R iliacus muscle.   CT 5/28 no change  s/p drainage on 5/30/2019-cultures negative  S/p removal hardware 6/5-no cultures done  CT on 6/8 with residual abscess in the right iliacus muscle, 2.5 x 1.8 cm, slightly smaller than prior  s/p CT-guided deep bone biopsy 6/19/2019.  Cx +Mtb  MRI 6/28 chronic discitis, diffuse OM L5, 3 and 4 " cm abscesses right glute, 1.5 cm abscess or necrosis right posterior ileum, 3.3 cm abscess right SI joint  Continue TB therapy as above  At the suggestion of [facility] added Levaquin     +QuantiGold  Path with necrotizing granulomas  AFB stain neg  AFB cultures + 6/19  Started RIPE +B6 7/3  Monitor for visual changes while on ethambutol.    Ophthalmology evaluation for baseline vision as well as color vision testing when able     Type 2 DM, chronic  Hemoglobin A1c 6.3   Keep BS under 150 to help control current infection    I have performed a physical exam and reviewed and updated ROS as of today.  In review of yesterday's note dated  7/26/2019, there are no changes except as documented above.    Will follow up at [facility] for meds and case management after discharge    Discussed with IM Dr Sorensen

## 2019-07-27 NOTE — PROGRESS NOTES
Hospital Medicine Daily Progress Note    Date of Service  7/27/2019    Chief Complaint  68 y.o. female admitted 5/29/2019 with right iliac and gluteus abscess    Hospital Course  Patient is a 68 year old with history of breast cancer, copd, dm, gerd, dl and htn who was admitted with right iliac gluteus abscess.  She also has a known history of brain lesions with increasing enhancement noted on MRI and possible chronic osteomyelitis of the lumbar spine.    Interval Problem Update  axox3 but some ongoing confusion, more alert today off long acting opiate  Denies pain at rest, L hip pain with ambulation  Denies sob  No dizziness   Ros otherwise negative  Discussed with ID    Consultants/Specialty  ID  Neurosurgery    Code Status  Full    Disposition  snf pending, would like to go to snf near family in [place]    Review of Systems  Review of Systems   Constitutional: Negative for chills, fever and malaise/fatigue.   HENT: Negative for hearing loss and sore throat.    Eyes: Negative for blurred vision.   Respiratory: Negative for cough, shortness of breath and wheezing.    Cardiovascular: Negative for chest pain, palpitations and leg swelling.   Gastrointestinal: Negative for abdominal pain, diarrhea, heartburn, nausea and vomiting.   Genitourinary: Negative for dysuria.   Musculoskeletal: Negative for back pain, joint pain and neck pain.   Skin: Negative for rash.   Neurological: Negative for dizziness, sensory change, speech change, focal weakness, weakness and headaches.   Psychiatric/Behavioral: The patient is not nervous/anxious.         Physical Exam  Temp:  [36.1 °C (97 °F)-36.6 °C (97.8 °F)] 36.6 °C (97.8 °F)  Pulse:  [73-87] 87  Resp:  [16-20] 20  BP: (100-159)/(63-93) 159/89  SpO2:  [95 %-100 %] 100 %    Physical Exam   Constitutional: She appears well-developed and well-nourished. No distress.   HENT:   Head: Normocephalic and atraumatic.   Mouth/Throat: Oropharynx is clear and moist.   Surgical site cdi   Eyes:  Pupils are equal, round, and reactive to light. Conjunctivae are normal.   Neck: Normal range of motion. Neck supple.   Cardiovascular: Normal rate, regular rhythm, normal heart sounds and intact distal pulses.  Exam reveals no gallop and no friction rub.    No murmur heard.  Pulmonary/Chest: Effort normal and breath sounds normal. No respiratory distress. She has no wheezes. She has no rales. She exhibits no tenderness.   Abdominal: Soft. Bowel sounds are normal. She exhibits no distension and no mass. There is no tenderness. There is no rebound and no guarding.   Musculoskeletal: Normal range of motion. She exhibits no edema or tenderness.   Neurological: She is alert. She has normal reflexes. No cranial nerve deficit. She exhibits normal muscle tone. Coordination normal.   Skin: Skin is warm and dry. No rash noted. She is not diaphoretic. No erythema. No pallor.   Nursing note and vitals reviewed.      Fluids  No intake or output data in the 24 hours ending 07/27/19 1127    Laboratory  Recent Labs      07/25/19   0400  07/26/19   0301   WBC  4.1*  4.5*   RBC  3.70*  3.71*   HEMOGLOBIN  11.1*  11.1*   HEMATOCRIT  34.5*  34.3*   MCV  93.2  92.5   MCH  30.0  29.9   MCHC  32.2*  32.4*   RDW  54.1*  52.9*   PLATELETCT  249  244   MPV  9.2  9.1                       Imaging  US-EXTREMITY ARTERY LOWER UNILAT RIGHT   Final Result      Right gluteal pseudoaneurysm measuring 3.65 x 2.24 x 2.31 cm.      IR-INJ-EXT PSEUDOANEURYSM PERC   Final Result      1.  Ultrasound guided thrombin injection for treatment of a RIGHT gluteal 3.6 cm pseudoaneurysm.      CTA ABDOMEN PELVIS W & W/O POST PROCESS   Final Result      1.  Interval enlargement of a hyperdense ovoid masslike structure immediately posterior to the right ilium, possibly representing a pseudoaneurysm.   2.  Stable thin-walled apparent fluid-filled structure immediately deep to the right ilium.   3.   Minimal retroperitoneal adenopathy, grossly stable.   4.   Cholelithiasis.      MR-BRAIN-WITH & W/O   Final Result      1.  Innumerable nodular enhancing lesions throughout the brain parenchyma both the supra and infratentorial compartments predominantly near the gray-white junction. There has been interval increase in size of several of these lesions since previous exam.    These changes may be secondary to metastatic disease or granulomatous disease.      2.  Interval right parietal craniotomy with underlying elliptical postsurgical defect.      3.  Interval increase in size of index right thalamic lesion which has increased vasogenic edema since previous exam.      4.  Age-related cerebral atrophy.      IR-PICC LINE PLACEMENT W/ GUIDANCE > AGE 5   Final Result                  Ultrasound-guided PICC placement performed by qualified nursing staff as    above.          CT-NEEDLE BX-ABD-RETROPERITONL   Final Result      1.  CT GUIDED biopsy of a right pelvic phlegmon.      2. Significant interval enlargement of a right gluteal pseudoaneurysm. CTA and consideration for possible intervention either with direct thrombin injection or endovascular treatment was suggested. This was conveyed to Dr. Alatorre on 7/11/2019 via [place]    text at 1750 hours.      UB-FULARST-3 VIEW   Final Result         1.  Moderate stool in the colon suggests changes of constipation, otherwise nonspecific bowel gas pattern   2.  Atherosclerosis      US-EXTREMITY VENOUS LOWER BILAT   Final Result      IR-US GUIDED PIV   Final Result    Ultrasound-guided PERIPHERAL IV INSERTION performed by    qualified nursing staff as above.            MR-LUMBAR SPINE-WITH & W/O   Final Result         1.  Grade 2-3 spondylolisthesis at the L5-S1 level with bilateral spondylolysis of the pars interarticularis. This causes severe bilateral neural foraminal stenosis at this level.      2.  Interval removal of posterior fusion hardware at the lumbosacral junction.      3.  Diffuse osteomyelitis involving the L5 vertebral body  and the first 3 sacral segments.      4.  Chronic discitis at the L5-S1 level with anterior paraspinous abscess at this level and anterior to the S1 sacral segment.      5.  Smaller intraosseous bone bone abscesses or areas of necrosis noted in the sacrum.      6.  There is also evidence of osteomyelitis involving the jose antonio and sacrum bilaterally along the course of the previous sacral fixation screw. There is a large 4 cm abscess noted in the right gluteal soft tissues in a smaller 3 cm chronic appearing    abscess in the left gluteal musculature.      7.  There is a 1.5 cm intraosseous abscess or area of necrosis in the right posterior ilium.      8.  There is a 3.3 cm centimeters multiloculated abscess anterior to the right sacroiliac joint.      MR-STEALTH BRAIN WITH & W/O   Final Result         1.  Innumerable subcentimeter brain metastases, many at the gray-white junction but also multiple noted throughout the basal ganglia and brainstem.      2.  Largest index lesion is noted in the right thalamus and has increased in size since previous exam and currently measures 9 mm and has a moderate amount of surrounding vasogenic edema.      US-EXTREMITY NON VASCULAR UNILATERAL RIGHT   Final Result      No right hip joint effusion. No soft tissue fluid collection.      MR-BRAIN-WITH & W/O   Final Result      1.  Innumerable supra and infratentorial enhancing nodules are again noted throughout the brain parenchyma with multiple lesions appearing somewhat larger and with increased enhancement. No associated diffusion restriction. Unchanged differential    considerations including bacterial or fungal infection versus metastatic disease.   2.  Mild diffuse cerebral substance loss.   3.  Mild microangiopathic ischemic change versus demyelination or gliosis.      CT-NEEDLE BX-DEEP BONE   Final Result      CT GUIDED RIGHT ILIUM DEEP BONE BIOPSY. SAMPLES WERE SENT TO [facility] FOR ANALYSIS.      CT-PELVIS WITH   Final Result          1.  No significant interval change in the size of the RIGHT iliac muscle fluid collections   2.  Hyperdense RIGHT gluteal lesion moderately suspicious for pseudoaneurysm with adjacent hematoma.   3.  Changes in the RIGHT iliac bone, BILATERAL sacrum and LEFT iliac bone suspicious for osteomyelitis   4.  Changes at L5-S1 suspicious for discitis osteomyelitis      CT-PELVIS WITH   Final Result      1.  Residual or recurrent abscess within the right iliacus muscle measuring 2.5 x 1.8 cm. It slightly smaller than on 5/28/2019      2.  Interval removal of hardware from the iliac bones and sacrum      3.  Lytic change of the right iliac bone and the sacrum. This could be related to hardware versus osteomyelitis.      4.  Subacute fractures of the right ilium, sacrum, and there is lucency within the left iliac bone that is likely from hardware      MR-BRAIN-WITH & W/O   Final Result      1.  Interval progression of supra and infratentorial intra-axial nodules and associated edema. This short-term interval progression favors infection over neoplasm though both remain considerations.   2.  Mild atrophy      DX-PORTABLE FLUOROSCOPY < 1 HOUR   Final Result         Portable fluoroscopy utilized for 2 minutes.      DX-PELVIS-1 OR 2 VIEWS   Final Result      Status post hardware removal from the right SI joint      DX-CHEST-PORTABLE (1 VIEW)   Final Result      Interstitial prominence could be due to pulmonary edema or hypoventilatory change.      DX-CHEST-LIMITED (1 VIEW)   Final Result      LEFT basilar underinflation atelectasis or pneumonia      CT-DRAIN-HEMATOMA - SEROMA   Final Result      CT-guided RIGHT pelvic fluid collection aspiration, laboratory evaluation pending              Assessment/Plan  * Brain lesion- (present on admission)   Assessment & Plan    Right-sided craniotomy for resection of superficial mass  6/28/2019  Continue Isoniazid, Rifampin, Ethambutol, Pyrazinamide per ID, discussed with ID they  have reached out to [facility] to discuss outpatient regimen  Continue Keppra for seizure prophylaxis, per ID restart steroids if s/o seizure     Granulomatous disease - (present on admission)   Assessment & Plan    Isoniazid, Rifampin, Ethambutol, Pyrazinamide     Sepsis (HCC)- (present on admission)   Assessment & Plan    This is sepsis (without associated acute organ dysfunction).    Source - Right Iliac and Gluteus medius abscess     Abscess of right iliac muscle and right gluteus medius muscle- (present on admission)   Assessment & Plan    CT guided pelvic aspiration 5/30/2019  Hardware removal, pelvis 6/5/2019  CT guided deep bone biopsy 6/19/2019  CT guided aspiration/biopsy of a R gluteal fluid collection/phlegmon 7/11/2019 - consistent with TB  Isoniazid, Rifampin, Ethambutol, Pyrazinamide  Levaquin added, discussed with ID, plan to continue for 9-12 months       Hypokalemia- (present on admission)   Assessment & Plan    Will recheck     Osteomyelitis (HCC)- (present on admission)   Assessment & Plan    Query  ID following     Pseudoaneurysm following procedure (HCC)- (present on admission)   Assessment & Plan      POD #4 s/p IR thrombin injection for thrombosed psedudoanerysm     Hypomagnesemia- (present on admission)   Assessment & Plan    Oral magnesium     Dysphagia- (present on admission)   Assessment & Plan    Dysphagia 3  Speech following     Essential hypertension- (present on admission)   Assessment & Plan    Continue Metoprolol, Losartan and Amlodipine as tolerated     COPD (chronic obstructive pulmonary disease) (HCC)- (present on admission)   Assessment & Plan    RT protocol  No s/o exacerbation     Non-severe protein-calorie malnutrition (HCC)- (present on admission)   Assessment & Plan    Nutrition consult       History of DVT (deep vein thrombosis)- (present on admission)   Assessment & Plan    Hold Xarelto     RLS (restless legs syndrome)- (present on admission)   Assessment & Plan     Pramipexole     Chronic pain- (present on admission)   Assessment & Plan      Suspect opiates contributing to confusion - improved, no pain today and somnolent so I will stop long acting opiates, continue taper and use short acting only       History of breast cancer- (present on admission)   Assessment & Plan    history left mastectomy and breast implant.            VTE prophylaxis: Heparin

## 2019-07-27 NOTE — PROGRESS NOTES
Patient is resting in bed. Patient is A&Ox3, disoriented to situation. Encouraged patient to sit up in chair for meals and ambulate to bathroom. Patient ambulating to bathroom with 1 person assist and FWW. Bed alarm on, call light within reach. Hourly rounding.

## 2019-07-27 NOTE — PROGRESS NOTES
Patient denies pain through the shift. Patient A/O x 3 throughout shift, not oriented to the month. Patient had incontinence x 1 so far this shift, and had a small bowel movement. Complete bed change performed. Patient complained of itching on legs, lotion applied. Will continue to monitor.

## 2019-07-28 PROBLEM — E87.6 HYPOKALEMIA: Status: RESOLVED | Noted: 2019-07-17 | Resolved: 2019-07-28

## 2019-07-28 PROCEDURE — A9270 NON-COVERED ITEM OR SERVICE: HCPCS | Performed by: FAMILY MEDICINE

## 2019-07-28 PROCEDURE — 99232 SBSQ HOSP IP/OBS MODERATE 35: CPT | Performed by: INTERNAL MEDICINE

## 2019-07-28 PROCEDURE — 700102 HCHG RX REV CODE 250 W/ 637 OVERRIDE(OP): Performed by: FAMILY MEDICINE

## 2019-07-28 PROCEDURE — A9270 NON-COVERED ITEM OR SERVICE: HCPCS | Performed by: HOSPITALIST

## 2019-07-28 PROCEDURE — 770001 HCHG ROOM/CARE - MED/SURG/GYN PRIV*

## 2019-07-28 PROCEDURE — 700111 HCHG RX REV CODE 636 W/ 250 OVERRIDE (IP): Performed by: HOSPITALIST

## 2019-07-28 PROCEDURE — 700102 HCHG RX REV CODE 250 W/ 637 OVERRIDE(OP): Performed by: INTERNAL MEDICINE

## 2019-07-28 PROCEDURE — 700102 HCHG RX REV CODE 250 W/ 637 OVERRIDE(OP): Performed by: HOSPITALIST

## 2019-07-28 PROCEDURE — 700101 HCHG RX REV CODE 250: Performed by: FAMILY MEDICINE

## 2019-07-28 PROCEDURE — A9270 NON-COVERED ITEM OR SERVICE: HCPCS | Performed by: INTERNAL MEDICINE

## 2019-07-28 PROCEDURE — 99232 SBSQ HOSP IP/OBS MODERATE 35: CPT | Performed by: HOSPITALIST

## 2019-07-28 RX ORDER — MAGNESIUM SULFATE HEPTAHYDRATE 40 MG/ML
2 INJECTION, SOLUTION INTRAVENOUS ONCE
Status: DISCONTINUED | OUTPATIENT
Start: 2019-07-28 | End: 2019-07-28

## 2019-07-28 RX ADMIN — PRAMIPEXOLE DIHYDROCHLORIDE 0.25 MG: 0.5 TABLET ORAL at 20:00

## 2019-07-28 RX ADMIN — LEVETIRACETAM 500 MG: 500 TABLET, FILM COATED ORAL at 05:24

## 2019-07-28 RX ADMIN — FERROUS SULFATE TAB 325 MG (65 MG ELEMENTAL FE) 325 MG: 325 (65 FE) TAB at 09:41

## 2019-07-28 RX ADMIN — SUCRALFATE 1 G: 1 SUSPENSION ORAL at 05:28

## 2019-07-28 RX ADMIN — SENNOSIDES, DOCUSATE SODIUM 2 TABLET: 50; 8.6 TABLET, FILM COATED ORAL at 05:28

## 2019-07-28 RX ADMIN — LEVOFLOXACIN 750 MG: 750 TABLET, FILM COATED ORAL at 05:23

## 2019-07-28 RX ADMIN — RIFAMPIN 600 MG: 300 CAPSULE ORAL at 05:28

## 2019-07-28 RX ADMIN — ETHAMBUTOL HYDROCHLORIDE 800 MG: 400 TABLET, FILM COATED ORAL at 05:29

## 2019-07-28 RX ADMIN — CINACALCET HYDROCHLORIDE 60 MG: 30 TABLET, COATED ORAL at 05:28

## 2019-07-28 RX ADMIN — HEPARIN SODIUM 5000 UNITS: 5000 INJECTION, SOLUTION INTRAVENOUS; SUBCUTANEOUS at 05:23

## 2019-07-28 RX ADMIN — OXYCODONE HYDROCHLORIDE 5 MG: 5 TABLET ORAL at 18:06

## 2019-07-28 RX ADMIN — PYRIDOXINE HCL TAB 50 MG 100 MG: 50 TAB at 05:28

## 2019-07-28 RX ADMIN — ACETAMINOPHEN 500 MG: 500 TABLET ORAL at 09:45

## 2019-07-28 RX ADMIN — AMLODIPINE BESYLATE 10 MG: 5 TABLET ORAL at 05:24

## 2019-07-28 RX ADMIN — METOPROLOL TARTRATE 25 MG: 25 TABLET, FILM COATED ORAL at 17:57

## 2019-07-28 RX ADMIN — HEPARIN SODIUM 5000 UNITS: 5000 INJECTION, SOLUTION INTRAVENOUS; SUBCUTANEOUS at 17:58

## 2019-07-28 RX ADMIN — TEMAZEPAM 15 MG: 15 CAPSULE ORAL at 20:25

## 2019-07-28 RX ADMIN — PRAMIPEXOLE DIHYDROCHLORIDE 0.25 MG: 0.5 TABLET ORAL at 05:28

## 2019-07-28 RX ADMIN — SUCRALFATE 1 G: 1 SUSPENSION ORAL at 23:49

## 2019-07-28 RX ADMIN — LEVETIRACETAM 500 MG: 500 TABLET, FILM COATED ORAL at 17:57

## 2019-07-28 RX ADMIN — ATORVASTATIN CALCIUM 10 MG: 10 TABLET, FILM COATED ORAL at 17:57

## 2019-07-28 RX ADMIN — GABAPENTIN 400 MG: 400 CAPSULE ORAL at 05:24

## 2019-07-28 RX ADMIN — POTASSIUM CHLORIDE 20 MEQ: 20 TABLET, EXTENDED RELEASE ORAL at 05:23

## 2019-07-28 RX ADMIN — OMEPRAZOLE 20 MG: 20 CAPSULE, DELAYED RELEASE ORAL at 05:23

## 2019-07-28 RX ADMIN — PYRAZINAMIDE 1000 MG: 500 TABLET ORAL at 06:00

## 2019-07-28 RX ADMIN — METOPROLOL TARTRATE 25 MG: 25 TABLET, FILM COATED ORAL at 05:23

## 2019-07-28 RX ADMIN — LIDOCAINE 2 PATCH: 50 PATCH CUTANEOUS at 11:30

## 2019-07-28 RX ADMIN — SUCRALFATE 1 G: 1 SUSPENSION ORAL at 11:30

## 2019-07-28 RX ADMIN — SUCRALFATE 1 G: 1 SUSPENSION ORAL at 17:57

## 2019-07-28 RX ADMIN — SUCRALFATE 1 G: 1 SUSPENSION ORAL at 00:00

## 2019-07-28 RX ADMIN — Medication 1 CAPSULE: at 05:29

## 2019-07-28 RX ADMIN — PRAMIPEXOLE DIHYDROCHLORIDE 0.25 MG: 0.5 TABLET ORAL at 15:19

## 2019-07-28 RX ADMIN — LOSARTAN POTASSIUM 75 MG: 50 TABLET ORAL at 05:23

## 2019-07-28 RX ADMIN — CYCLOBENZAPRINE 10 MG: 10 TABLET, FILM COATED ORAL at 11:30

## 2019-07-28 RX ADMIN — ISONIAZID 300 MG: 300 TABLET ORAL at 05:29

## 2019-07-28 ASSESSMENT — ENCOUNTER SYMPTOMS
VOMITING: 0
SORE THROAT: 0
BACK PAIN: 0
SENSORY CHANGE: 0
HEARTBURN: 0
WEAKNESS: 0
ABDOMINAL PAIN: 0
NERVOUS/ANXIOUS: 0
BLURRED VISION: 0
DIARRHEA: 0
HEADACHES: 0
NAUSEA: 0
SPEECH CHANGE: 0
DIZZINESS: 0
CHILLS: 0
WHEEZING: 0
NECK PAIN: 0
FOCAL WEAKNESS: 0
SHORTNESS OF BREATH: 0
PALPITATIONS: 0
COUGH: 0
FEVER: 0

## 2019-07-28 NOTE — PROGRESS NOTES
Report received from PATRICK Prasad. Pt denies needs at this time. Safety precautions in place. Call light within reach.

## 2019-07-28 NOTE — PROGRESS NOTES
Sent pharmacy a message to send pt Mirapex up from pharmacy since it is not in med select or pt drawer.  Message was sent to [facility] called to distribute the med, med is late at this point. Will administer when med is dispensed.

## 2019-07-28 NOTE — PROGRESS NOTES
Pt does not have IV access, MD ordered IV magnesum, pt has one arm to access (mastectomy to left arm, only right arm access), which had a PICC line that was removed this week.  MD Lyon notified, Md states 'I will order/trend her magnesium tomorrow'. No new orders received

## 2019-07-28 NOTE — CARE PLAN
Problem: Safety  Goal: Will remain free from injury  Outcome: PROGRESSING AS EXPECTED  Pt rings in appropriately for assistance, frequent checks made.    Problem: Bowel/Gastric:  Goal: Will not experience complications related to bowel motility  Outcome: PROGRESSING AS EXPECTED  Pt had bowel movement, stool softners available.

## 2019-07-28 NOTE — PROGRESS NOTES
[facility] Medicine Daily Progress Note    Date of Service  7/28/2019    Chief Complaint  68 y.o. female admitted 5/29/2019 with right iliac and gluteus abscess    Hospital Course  Patient is a 68 year old with history of breast cancer, copd, dm, gerd, dl and htn who was admitted with right iliac gluteus abscess.  She also has a known history of brain lesions with increasing enhancement noted on MRI and possible chronic osteomyelitis of the lumbar spine.    Interval Problem Update  Doing well off of long acting opiates  Hip pain well controlled - none currently  Axox3, appropriate  Ros otherwise negative - wants to know when she can go to a snf in [place]    Consultants/Specialty  ID  Neurosurgery    Code Status  Full    Disposition  snf pending, would like to go to snf near family in [place]    Review of Systems  Review of Systems   Constitutional: Negative for chills, fever and malaise/fatigue.   HENT: Negative for hearing loss and sore throat.    Eyes: Negative for blurred vision.   Respiratory: Negative for cough, shortness of breath and wheezing.    Cardiovascular: Negative for chest pain, palpitations and leg swelling.   Gastrointestinal: Negative for abdominal pain, diarrhea, heartburn, nausea and vomiting.   Genitourinary: Negative for dysuria.   Musculoskeletal: Negative for back pain, joint pain and neck pain.   Skin: Negative for rash.   Neurological: Negative for dizziness, sensory change, speech change, focal weakness, weakness and headaches.   Psychiatric/Behavioral: The patient is not nervous/anxious.         Physical Exam  Temp:  [36.3 °C (97.3 °F)-36.9 °C (98.5 °F)] 36.3 °C (97.3 °F)  Pulse:  [81-89] 81  Resp:  [16-20] 16  BP: (121-139)/(55-62) 129/55  SpO2:  [93 %-100 %] 100 %    Physical Exam   Constitutional: She appears well-developed and well-nourished. No distress.   HENT:   Head: Normocephalic and atraumatic.   Mouth/Throat: Oropharynx is clear and moist.   Surgical site cdi   Eyes: Pupils are equal,  round, and reactive to light. Conjunctivae are normal.   Neck: Normal range of motion. Neck supple.   Cardiovascular: Normal rate, regular rhythm, normal heart sounds and intact distal pulses.  Exam reveals no gallop and no friction rub.    No murmur heard.  Pulmonary/Chest: Effort normal and breath sounds normal. No respiratory distress. She has no wheezes. She has no rales. She exhibits no tenderness.   Abdominal: Soft. Bowel sounds are normal. She exhibits no distension and no mass. There is no tenderness. There is no rebound and no guarding.   Musculoskeletal: Normal range of motion. She exhibits no edema or tenderness.   Neurological: She is alert. She has normal reflexes. No cranial nerve deficit. She exhibits normal muscle tone. Coordination normal.   Skin: Skin is warm and dry. No rash noted. She is not diaphoretic. No erythema. No pallor.   Nursing note and vitals reviewed.      Fluids    Intake/Output Summary (Last 24 hours) at 07/28/19 1128  Last data filed at 07/28/19 0900   Gross per 24 hour   Intake             1040 ml   Output              575 ml   Net              465 ml       Laboratory  Recent Labs      07/26/19   0301   WBC  4.5*   RBC  3.71*   HEMOGLOBIN  11.1*   HEMATOCRIT  34.3*   MCV  92.5   MCH  29.9   MCHC  32.4*   RDW  52.9*   PLATELETCT  244   MPV  9.1                       Imaging  US-EXTREMITY ARTERY LOWER UNILAT RIGHT   Final Result      Right gluteal pseudoaneurysm measuring 3.65 x 2.24 x 2.31 cm.      IR-INJ-EXT PSEUDOANEURYSM PERC   Final Result      1.  Ultrasound guided thrombin injection for treatment of a RIGHT gluteal 3.6 cm pseudoaneurysm.      CTA ABDOMEN PELVIS W & W/O POST PROCESS   Final Result      1.  Interval enlargement of a hyperdense ovoid masslike structure immediately posterior to the right ilium, possibly representing a pseudoaneurysm.   2.  Stable thin-walled apparent fluid-filled structure immediately deep to the right ilium.   3.   Minimal retroperitoneal  adenopathy, grossly stable.   4.  Cholelithiasis.      MR-BRAIN-WITH & W/O   Final Result      1.  Innumerable nodular enhancing lesions throughout the brain parenchyma both the supra and infratentorial compartments predominantly near the gray-white junction. There has been interval increase in size of several of these lesions since previous exam.    These changes may be secondary to metastatic disease or granulomatous disease.      2.  Interval right parietal craniotomy with underlying elliptical postsurgical defect.      3.  Interval increase in size of index right thalamic lesion which has increased vasogenic edema since previous exam.      4.  Age-related cerebral atrophy.      IR-PICC LINE PLACEMENT W/ GUIDANCE > AGE 5   Final Result                  Ultrasound-guided PICC placement performed by qualified nursing staff as    above.          CT-NEEDLE BX-ABD-RETROPERITONL   Final Result      1.  CT GUIDED biopsy of a right pelvic phlegmon.      2. Significant interval enlargement of a right gluteal pseudoaneurysm. CTA and consideration for possible intervention either with direct thrombin injection or endovascular treatment was suggested. This was conveyed to Dr. Alatorre on 7/11/2019 via [place]    text at 1750 hours.      UP-RXQXCIQ-3 VIEW   Final Result         1.  Moderate stool in the colon suggests changes of constipation, otherwise nonspecific bowel gas pattern   2.  Atherosclerosis      US-EXTREMITY VENOUS LOWER BILAT   Final Result      IR-US GUIDED PIV   Final Result    Ultrasound-guided PERIPHERAL IV INSERTION performed by    qualified nursing staff as above.            MR-LUMBAR SPINE-WITH & W/O   Final Result         1.  Grade 2-3 spondylolisthesis at the L5-S1 level with bilateral spondylolysis of the pars interarticularis. This causes severe bilateral neural foraminal stenosis at this level.      2.  Interval removal of posterior fusion hardware at the lumbosacral junction.      3.  Diffuse  osteomyelitis involving the L5 vertebral body and the first 3 sacral segments.      4.  Chronic discitis at the L5-S1 level with anterior paraspinous abscess at this level and anterior to the S1 sacral segment.      5.  Smaller intraosseous bone bone abscesses or areas of necrosis noted in the sacrum.      6.  There is also evidence of osteomyelitis involving the jose antonio and sacrum bilaterally along the course of the previous sacral fixation screw. There is a large 4 cm abscess noted in the right gluteal soft tissues in a smaller 3 cm chronic appearing    abscess in the left gluteal musculature.      7.  There is a 1.5 cm intraosseous abscess or area of necrosis in the right posterior ilium.      8.  There is a 3.3 cm centimeters multiloculated abscess anterior to the right sacroiliac joint.      MR-STEALTH BRAIN WITH & W/O   Final Result         1.  Innumerable subcentimeter brain metastases, many at the gray-white junction but also multiple noted throughout the basal ganglia and brainstem.      2.  Largest index lesion is noted in the right thalamus and has increased in size since previous exam and currently measures 9 mm and has a moderate amount of surrounding vasogenic edema.      US-EXTREMITY NON VASCULAR UNILATERAL RIGHT   Final Result      No right hip joint effusion. No soft tissue fluid collection.      MR-BRAIN-WITH & W/O   Final Result      1.  Innumerable supra and infratentorial enhancing nodules are again noted throughout the brain parenchyma with multiple lesions appearing somewhat larger and with increased enhancement. No associated diffusion restriction. Unchanged differential    considerations including bacterial or fungal infection versus metastatic disease.   2.  Mild diffuse cerebral substance loss.   3.  Mild microangiopathic ischemic change versus demyelination or gliosis.      CT-NEEDLE BX-DEEP BONE   Final Result      CT GUIDED RIGHT ILIUM DEEP BONE BIOPSY. SAMPLES WERE SENT TO [facility]  FOR ANALYSIS.      CT-PELVIS WITH   Final Result         1.  No significant interval change in the size of the RIGHT iliac muscle fluid collections   2.  Hyperdense RIGHT gluteal lesion moderately suspicious for pseudoaneurysm with adjacent hematoma.   3.  Changes in the RIGHT iliac bone, BILATERAL sacrum and LEFT iliac bone suspicious for osteomyelitis   4.  Changes at L5-S1 suspicious for discitis osteomyelitis      CT-PELVIS WITH   Final Result      1.  Residual or recurrent abscess within the right iliacus muscle measuring 2.5 x 1.8 cm. It slightly smaller than on 5/28/2019      2.  Interval removal of hardware from the iliac bones and sacrum      3.  Lytic change of the right iliac bone and the sacrum. This could be related to hardware versus osteomyelitis.      4.  Subacute fractures of the right ilium, sacrum, and there is lucency within the left iliac bone that is likely from hardware      MR-BRAIN-WITH & W/O   Final Result      1.  Interval progression of supra and infratentorial intra-axial nodules and associated edema. This short-term interval progression favors infection over neoplasm though both remain considerations.   2.  Mild atrophy      DX-PORTABLE FLUOROSCOPY < 1 HOUR   Final Result         Portable fluoroscopy utilized for 2 minutes.      DX-PELVIS-1 OR 2 VIEWS   Final Result      Status post hardware removal from the right SI joint      DX-CHEST-PORTABLE (1 VIEW)   Final Result      Interstitial prominence could be due to pulmonary edema or hypoventilatory change.      DX-CHEST-LIMITED (1 VIEW)   Final Result      LEFT basilar underinflation atelectasis or pneumonia      CT-DRAIN-HEMATOMA - SEROMA   Final Result      CT-guided RIGHT pelvic fluid collection aspiration, laboratory evaluation pending              Assessment/Plan  * Brain lesion- (present on admission)   Assessment & Plan    Right-sided craniotomy for resection of superficial mass  6/28/2019  Continue Isoniazid, Rifampin,  Ethambutol, Pyrazinamide per ID, discussed with ID they have reached out to [facility] center to discuss outpatient regimen  Continue Keppra for seizure prophylaxis, per ID restart steroids if s/o seizure     Granulomatous disease - (present on admission)   Assessment & Plan    Isoniazid, Rifampin, Ethambutol, Pyrazinamide     Sepsis (HCC)- (present on admission)   Assessment & Plan    This is sepsis (without associated acute organ dysfunction).    Source - Right Iliac and Gluteus medius abscess     Abscess of right iliac muscle and right gluteus medius muscle- (present on admission)   Assessment & Plan    CT guided pelvic aspiration 5/30/2019  Hardware removal, pelvis 6/5/2019  CT guided deep bone biopsy 6/19/2019  CT guided aspiration/biopsy of a R gluteal fluid collection/phlegmon 7/11/2019 - consistent with TB  Isoniazid, Rifampin, Ethambutol, Pyrazinamide  Levaquin added, discussed with ID, plan to continue for 9-12 months       Hypokalemia- (present on admission)   Assessment & Plan    Will recheck     Osteomyelitis (HCC)- (present on admission)   Assessment & Plan    Query  ID following     Pseudoaneurysm following procedure (HCC)- (present on admission)   Assessment & Plan      POD #4 s/p IR thrombin injection for thrombosed psedudoanerysm     Hypomagnesemia- (present on admission)   Assessment & Plan    Oral magnesium     Dysphagia- (present on admission)   Assessment & Plan    Dysphagia 3  Speech following     Essential hypertension- (present on admission)   Assessment & Plan    Continue Metoprolol, Losartan and Amlodipine as tolerated     COPD (chronic obstructive pulmonary disease) (HCC)- (present on admission)   Assessment & Plan    RT protocol  No s/o exacerbation     Non-severe protein-calorie malnutrition (HCC)- (present on admission)   Assessment & Plan    Nutrition consult       History of DVT (deep vein thrombosis)- (present on admission)   Assessment & Plan    Hold Xarelto     RLS (restless legs  syndrome)- (present on admission)   Assessment & Plan    Pramipexole     Chronic pain- (present on admission)   Assessment & Plan      Suspect opiates contributing to confusion - improved, no pain today and somnolent so I will stop long acting opiates, continue taper and use short acting only       History of breast cancer- (present on admission)   Assessment & Plan    history left mastectomy and breast implant.            VTE prophylaxis: Heparin

## 2019-07-29 LAB
ANION GAP SERPL CALC-SCNC: 8 MMOL/L (ref 0–11.9)
BASOPHILS # BLD AUTO: 0.9 % (ref 0–1.8)
BASOPHILS # BLD: 0.04 K/UL (ref 0–0.12)
BUN SERPL-MCNC: 21 MG/DL (ref 8–22)
CALCIUM SERPL-MCNC: 9.8 MG/DL (ref 8.5–10.5)
CHLORIDE SERPL-SCNC: 103 MMOL/L (ref 96–112)
CO2 SERPL-SCNC: 25 MMOL/L (ref 20–33)
CREAT SERPL-MCNC: 0.67 MG/DL (ref 0.5–1.4)
EOSINOPHIL # BLD AUTO: 0.28 K/UL (ref 0–0.51)
EOSINOPHIL NFR BLD: 6.6 % (ref 0–6.9)
ERYTHROCYTE [DISTWIDTH] IN BLOOD BY AUTOMATED COUNT: 53.6 FL (ref 35.9–50)
GLUCOSE SERPL-MCNC: 142 MG/DL (ref 65–99)
HCT VFR BLD AUTO: 36 % (ref 37–47)
HGB BLD-MCNC: 11 G/DL (ref 12–16)
IMM GRANULOCYTES # BLD AUTO: 0.01 K/UL (ref 0–0.11)
IMM GRANULOCYTES NFR BLD AUTO: 0.2 % (ref 0–0.9)
LYMPHOCYTES # BLD AUTO: 1.13 K/UL (ref 1–4.8)
LYMPHOCYTES NFR BLD: 26.7 % (ref 22–41)
MAGNESIUM SERPL-MCNC: 1.8 MG/DL (ref 1.5–2.5)
MCH RBC QN AUTO: 29.2 PG (ref 27–33)
MCHC RBC AUTO-ENTMCNC: 30.6 G/DL (ref 33.6–35)
MCV RBC AUTO: 95.5 FL (ref 81.4–97.8)
MONOCYTES # BLD AUTO: 0.51 K/UL (ref 0–0.85)
MONOCYTES NFR BLD AUTO: 12.1 % (ref 0–13.4)
NEUTROPHILS # BLD AUTO: 2.26 K/UL (ref 2–7.15)
NEUTROPHILS NFR BLD: 53.5 % (ref 44–72)
NRBC # BLD AUTO: 0 K/UL
NRBC BLD-RTO: 0 /100 WBC
PHOSPHATE SERPL-MCNC: 3.8 MG/DL (ref 2.5–4.5)
PLATELET # BLD AUTO: 230 K/UL (ref 164–446)
PMV BLD AUTO: 8.9 FL (ref 9–12.9)
POTASSIUM SERPL-SCNC: 3.1 MMOL/L (ref 3.6–5.5)
RBC # BLD AUTO: 3.77 M/UL (ref 4.2–5.4)
SODIUM SERPL-SCNC: 136 MMOL/L (ref 135–145)
WBC # BLD AUTO: 4.2 K/UL (ref 4.8–10.8)

## 2019-07-29 PROCEDURE — A9270 NON-COVERED ITEM OR SERVICE: HCPCS | Performed by: INTERNAL MEDICINE

## 2019-07-29 PROCEDURE — A9270 NON-COVERED ITEM OR SERVICE: HCPCS | Performed by: FAMILY MEDICINE

## 2019-07-29 PROCEDURE — 99232 SBSQ HOSP IP/OBS MODERATE 35: CPT | Performed by: INTERNAL MEDICINE

## 2019-07-29 PROCEDURE — 700102 HCHG RX REV CODE 250 W/ 637 OVERRIDE(OP): Performed by: FAMILY MEDICINE

## 2019-07-29 PROCEDURE — 80048 BASIC METABOLIC PNL TOTAL CA: CPT

## 2019-07-29 PROCEDURE — 700102 HCHG RX REV CODE 250 W/ 637 OVERRIDE(OP): Performed by: HOSPITALIST

## 2019-07-29 PROCEDURE — 700101 HCHG RX REV CODE 250: Performed by: FAMILY MEDICINE

## 2019-07-29 PROCEDURE — 700102 HCHG RX REV CODE 250 W/ 637 OVERRIDE(OP): Performed by: INTERNAL MEDICINE

## 2019-07-29 PROCEDURE — A9270 NON-COVERED ITEM OR SERVICE: HCPCS | Performed by: HOSPITALIST

## 2019-07-29 PROCEDURE — 83735 ASSAY OF MAGNESIUM: CPT

## 2019-07-29 PROCEDURE — 770001 HCHG ROOM/CARE - MED/SURG/GYN PRIV*

## 2019-07-29 PROCEDURE — 97530 THERAPEUTIC ACTIVITIES: CPT

## 2019-07-29 PROCEDURE — 700111 HCHG RX REV CODE 636 W/ 250 OVERRIDE (IP): Performed by: HOSPITALIST

## 2019-07-29 PROCEDURE — 84100 ASSAY OF PHOSPHORUS: CPT

## 2019-07-29 PROCEDURE — 97535 SELF CARE MNGMENT TRAINING: CPT | Mod: XU

## 2019-07-29 PROCEDURE — 36415 COLL VENOUS BLD VENIPUNCTURE: CPT

## 2019-07-29 PROCEDURE — 99232 SBSQ HOSP IP/OBS MODERATE 35: CPT | Performed by: HOSPITALIST

## 2019-07-29 PROCEDURE — 85025 COMPLETE CBC W/AUTO DIFF WBC: CPT

## 2019-07-29 RX ORDER — POTASSIUM CHLORIDE 20 MEQ/1
60 TABLET, EXTENDED RELEASE ORAL ONCE
Status: COMPLETED | OUTPATIENT
Start: 2019-07-29 | End: 2019-07-29

## 2019-07-29 RX ORDER — POTASSIUM CHLORIDE 20 MEQ/1
20 TABLET, EXTENDED RELEASE ORAL ONCE
Status: DISCONTINUED | OUTPATIENT
Start: 2019-07-29 | End: 2019-07-29

## 2019-07-29 RX ADMIN — CINACALCET HYDROCHLORIDE 60 MG: 30 TABLET, COATED ORAL at 04:27

## 2019-07-29 RX ADMIN — RIFAMPIN 600 MG: 300 CAPSULE ORAL at 04:39

## 2019-07-29 RX ADMIN — POTASSIUM CHLORIDE 20 MEQ: 20 TABLET, EXTENDED RELEASE ORAL at 04:35

## 2019-07-29 RX ADMIN — OMEPRAZOLE 20 MG: 20 CAPSULE, DELAYED RELEASE ORAL at 04:35

## 2019-07-29 RX ADMIN — Medication 1 CAPSULE: at 07:50

## 2019-07-29 RX ADMIN — SENNOSIDES, DOCUSATE SODIUM 2 TABLET: 50; 8.6 TABLET, FILM COATED ORAL at 17:10

## 2019-07-29 RX ADMIN — OXYCODONE HYDROCHLORIDE 5 MG: 5 TABLET ORAL at 14:28

## 2019-07-29 RX ADMIN — PRAMIPEXOLE DIHYDROCHLORIDE 0.25 MG: 0.5 TABLET ORAL at 13:38

## 2019-07-29 RX ADMIN — POTASSIUM CHLORIDE 60 MEQ: 1500 TABLET, EXTENDED RELEASE ORAL at 11:46

## 2019-07-29 RX ADMIN — HEPARIN SODIUM 5000 UNITS: 5000 INJECTION, SOLUTION INTRAVENOUS; SUBCUTANEOUS at 04:28

## 2019-07-29 RX ADMIN — AMLODIPINE BESYLATE 10 MG: 5 TABLET ORAL at 04:26

## 2019-07-29 RX ADMIN — GABAPENTIN 400 MG: 400 CAPSULE ORAL at 11:46

## 2019-07-29 RX ADMIN — LEVOFLOXACIN 750 MG: 750 TABLET, FILM COATED ORAL at 04:34

## 2019-07-29 RX ADMIN — SUCRALFATE 1 G: 1 SUSPENSION ORAL at 17:09

## 2019-07-29 RX ADMIN — ATORVASTATIN CALCIUM 10 MG: 10 TABLET, FILM COATED ORAL at 17:10

## 2019-07-29 RX ADMIN — ETHAMBUTOL HYDROCHLORIDE 800 MG: 400 TABLET, FILM COATED ORAL at 04:27

## 2019-07-29 RX ADMIN — LEVETIRACETAM 500 MG: 500 TABLET, FILM COATED ORAL at 04:34

## 2019-07-29 RX ADMIN — METOPROLOL TARTRATE 25 MG: 25 TABLET, FILM COATED ORAL at 17:10

## 2019-07-29 RX ADMIN — GABAPENTIN 400 MG: 400 CAPSULE ORAL at 17:11

## 2019-07-29 RX ADMIN — PRAMIPEXOLE DIHYDROCHLORIDE 0.25 MG: 0.5 TABLET ORAL at 21:18

## 2019-07-29 RX ADMIN — PYRAZINAMIDE 1000 MG: 500 TABLET ORAL at 04:39

## 2019-07-29 RX ADMIN — OXYCODONE HYDROCHLORIDE 5 MG: 5 TABLET ORAL at 02:49

## 2019-07-29 RX ADMIN — SUCRALFATE 1 G: 1 SUSPENSION ORAL at 04:36

## 2019-07-29 RX ADMIN — ISONIAZID 300 MG: 300 TABLET ORAL at 04:28

## 2019-07-29 RX ADMIN — MAGNESIUM 64 MG (MAGNESIUM CHLORIDE) TABLET,DELAYED RELEASE 64 MG: at 21:18

## 2019-07-29 RX ADMIN — LOSARTAN POTASSIUM 75 MG: 50 TABLET ORAL at 04:34

## 2019-07-29 RX ADMIN — PRAMIPEXOLE DIHYDROCHLORIDE 0.25 MG: 0.5 TABLET ORAL at 07:51

## 2019-07-29 RX ADMIN — LEVETIRACETAM 500 MG: 500 TABLET, FILM COATED ORAL at 17:10

## 2019-07-29 RX ADMIN — METOPROLOL TARTRATE 25 MG: 25 TABLET, FILM COATED ORAL at 04:35

## 2019-07-29 RX ADMIN — OXYCODONE HYDROCHLORIDE 10 MG: 10 TABLET, FILM COATED, EXTENDED RELEASE ORAL at 06:00

## 2019-07-29 RX ADMIN — SENNOSIDES, DOCUSATE SODIUM 2 TABLET: 50; 8.6 TABLET, FILM COATED ORAL at 04:35

## 2019-07-29 RX ADMIN — FERROUS SULFATE TAB 325 MG (65 MG ELEMENTAL FE) 325 MG: 325 (65 FE) TAB at 07:50

## 2019-07-29 RX ADMIN — LIDOCAINE 2 PATCH: 50 PATCH CUTANEOUS at 11:47

## 2019-07-29 RX ADMIN — SUCRALFATE 1 G: 1 SUSPENSION ORAL at 11:46

## 2019-07-29 RX ADMIN — CYCLOBENZAPRINE 10 MG: 10 TABLET, FILM COATED ORAL at 02:49

## 2019-07-29 RX ADMIN — OXYCODONE HYDROCHLORIDE 5 MG: 5 TABLET ORAL at 21:18

## 2019-07-29 RX ADMIN — GABAPENTIN 400 MG: 400 CAPSULE ORAL at 04:27

## 2019-07-29 RX ADMIN — MAGNESIUM 64 MG (MAGNESIUM CHLORIDE) TABLET,DELAYED RELEASE 64 MG: at 13:38

## 2019-07-29 RX ADMIN — PYRIDOXINE HCL TAB 50 MG 100 MG: 50 TAB at 04:35

## 2019-07-29 RX ADMIN — TEMAZEPAM 15 MG: 15 CAPSULE ORAL at 21:18

## 2019-07-29 RX ADMIN — HEPARIN SODIUM 5000 UNITS: 5000 INJECTION, SOLUTION INTRAVENOUS; SUBCUTANEOUS at 17:10

## 2019-07-29 ASSESSMENT — ENCOUNTER SYMPTOMS
HEADACHES: 0
NAUSEA: 0
CHILLS: 0
DIARRHEA: 0
BACK PAIN: 0
SORE THROAT: 0
FOCAL WEAKNESS: 0
WEAKNESS: 0
PALPITATIONS: 0
ABDOMINAL PAIN: 0
DIZZINESS: 0
FEVER: 0
SHORTNESS OF BREATH: 0
COUGH: 0
WHEEZING: 0
VOMITING: 0
HEARTBURN: 0
SENSORY CHANGE: 0
BLURRED VISION: 0
SPEECH CHANGE: 0
NECK PAIN: 0
NERVOUS/ANXIOUS: 0

## 2019-07-29 ASSESSMENT — COGNITIVE AND FUNCTIONAL STATUS - GENERAL
DRESSING REGULAR UPPER BODY CLOTHING: A LITTLE
DAILY ACTIVITIY SCORE: 20
SUGGESTED CMS G CODE MODIFIER DAILY ACTIVITY: CJ
DRESSING REGULAR LOWER BODY CLOTHING: A LITTLE
TOILETING: A LITTLE
HELP NEEDED FOR BATHING: A LITTLE

## 2019-07-29 NOTE — CARE PLAN
Problem: Communication  Goal: The ability to communicate needs accurately and effectively will improve  Outcome: PROGRESSING AS EXPECTED  Intermittently confused. Inconsistent pain reporting. Swedish speaking at times. Updated on POC, verbalizes understanding. Call bell within reach. Able to make needs known.    Problem: Safety  Goal: Will remain free from falls  Outcome: PROGRESSING AS EXPECTED  Bed in lowest position, wheels locked. Call bell within reach. Fall prevention education provided, verbalized understanding. Hourly rounding. Fall/safety precautions in place. Bed alarm in use. Pt remains free from fall/injury at this time.      Problem: Skin Integrity  Goal: Risk for impaired skin integrity will decrease  Outcome: PROGRESSING AS EXPECTED  Repositions self independently and frequently in bed. Skin remains intact.

## 2019-07-29 NOTE — PROGRESS NOTES
[facility] Medicine Daily Progress Note    Date of Service  7/29/2019    Chief Complaint  68 y.o. female admitted 5/29/2019 with right iliac and gluteus abscess    Hospital Course  Patient is a 68 year old with history of breast cancer, copd, dm, gerd, dl and htn who was admitted with right iliac gluteus abscess.  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    Interval Problem Update  She is a poor historian, but remains axox3  Hip pain improving and well controlled on current regimen  No dizziness, no seizures  No sob  Ros otherwise negative - wants to know when she can go to a snf in [place]    Consultants/Specialty  ID  Neurosurgery  Ortho    Code Status  Full    Disposition  snf pending, would like to go to snf near family in [place]    Review of Systems  Review of Systems   Constitutional: Negative for chills, fever and malaise/fatigue.   HENT: Negative for hearing loss and sore throat.    Eyes: Negative for blurred vision.   Respiratory: Negative for cough, shortness of breath and wheezing.    Cardiovascular: Negative for chest pain, palpitations and leg swelling.   Gastrointestinal: Negative for abdominal pain, diarrhea, heartburn, nausea and vomiting.   Genitourinary: Negative for dysuria.   Musculoskeletal: Negative for back pain, joint pain and neck pain.   Skin: Negative for rash.   Neurological: Negative for dizziness, sensory change, speech change, focal weakness, weakness and headaches.   Psychiatric/Behavioral: The patient is not nervous/anxious.         Physical Exam  Temp:  [36.3 °C (97.4 °F)-36.7 °C (98 °F)] 36.6 °C (97.8 °F)  Pulse:  [] 64  Resp:  [16-18] 16  BP: (107-126)/(64-74) 126/74  SpO2:  [91 %-97 %] 96 %    Physical Exam   Constitutional: She appears well-developed and well-nourished. No distress.   HENT:   Head: Normocephalic and atraumatic.   Mouth/Throat: Oropharynx is clear  and moist.   Eyes: Pupils are equal, round, and reactive to light. Conjunctivae are normal.   Neck: Normal range of motion. Neck supple.   Cardiovascular: Normal rate, regular rhythm, normal heart sounds and intact distal pulses.  Exam reveals no gallop and no friction rub.    No murmur heard.  Pulmonary/Chest: Effort normal and breath sounds normal. No respiratory distress. She has no wheezes. She has no rales. She exhibits no tenderness.   Abdominal: Soft. Bowel sounds are normal. She exhibits no distension and no mass. There is no tenderness. There is no rebound and no guarding.   Musculoskeletal: Normal range of motion. She exhibits no edema or tenderness.   Neurological: She is alert. She has normal reflexes. No cranial nerve deficit. She exhibits normal muscle tone. Coordination normal.   Skin: Skin is warm and dry. No rash noted. She is not diaphoretic. No erythema. No pallor.   Nursing note and vitals reviewed.      Fluids    Intake/Output Summary (Last 24 hours) at 07/29/19 1056  Last data filed at 07/29/19 0400   Gross per 24 hour   Intake             1580 ml   Output              750 ml   Net              830 ml       Laboratory  Recent Labs      07/29/19   0341   WBC  4.2*   RBC  3.77*   HEMOGLOBIN  11.0*   HEMATOCRIT  36.0*   MCV  95.5   MCH  29.2   MCHC  30.6*   RDW  53.6*   PLATELETCT  230   MPV  8.9*     Recent Labs      07/29/19   0341   SODIUM  136   POTASSIUM  3.1*   CHLORIDE  103   CO2  25   GLUCOSE  142*   BUN  21   CREATININE  0.67   CALCIUM  9.8                   Imaging  US-EXTREMITY ARTERY LOWER UNILAT RIGHT   Final Result      Right gluteal pseudoaneurysm measuring 3.65 x 2.24 x 2.31 cm.      IR-INJ-EXT PSEUDOANEURYSM PERC   Final Result      1.  Ultrasound guided thrombin injection for treatment of a RIGHT gluteal 3.6 cm pseudoaneurysm.      CTA ABDOMEN PELVIS W & W/O POST PROCESS   Final Result      1.  Interval enlargement of a hyperdense ovoid masslike structure immediately posterior to  the right ilium, possibly representing a pseudoaneurysm.   2.  Stable thin-walled apparent fluid-filled structure immediately deep to the right ilium.   3.   Minimal retroperitoneal adenopathy, grossly stable.   4.  Cholelithiasis.      MR-BRAIN-WITH & W/O   Final Result      1.  Innumerable nodular enhancing lesions throughout the brain parenchyma both the supra and infratentorial compartments predominantly near the gray-white junction. There has been interval increase in size of several of these lesions since previous exam.    These changes may be secondary to metastatic disease or granulomatous disease.      2.  Interval right parietal craniotomy with underlying elliptical postsurgical defect.      3.  Interval increase in size of index right thalamic lesion which has increased vasogenic edema since previous exam.      4.  Age-related cerebral atrophy.      IR-PICC LINE PLACEMENT W/ GUIDANCE > AGE 5   Final Result                  Ultrasound-guided PICC placement performed by qualified nursing staff as    above.          CT-NEEDLE BX-ABD-RETROPERITONL   Final Result      1.  CT GUIDED biopsy of a right pelvic phlegmon.      2. Significant interval enlargement of a right gluteal pseudoaneurysm. CTA and consideration for possible intervention either with direct thrombin injection or endovascular treatment was suggested. This was conveyed to Dr. Alatorre on 7/11/2019 via [place]    text at 1750 hours.      CS-SREFPCP-7 VIEW   Final Result         1.  Moderate stool in the colon suggests changes of constipation, otherwise nonspecific bowel gas pattern   2.  Atherosclerosis      US-EXTREMITY VENOUS LOWER BILAT   Final Result      IR-US GUIDED PIV   Final Result    Ultrasound-guided PERIPHERAL IV INSERTION performed by    qualified nursing staff as above.            MR-LUMBAR SPINE-WITH & W/O   Final Result         1.  Grade 2-3 spondylolisthesis at the L5-S1 level with bilateral spondylolysis of the pars interarticularis.  This causes severe bilateral neural foraminal stenosis at this level.      2.  Interval removal of posterior fusion hardware at the lumbosacral junction.      3.  Diffuse osteomyelitis involving the L5 vertebral body and the first 3 sacral segments.      4.  Chronic discitis at the L5-S1 level with anterior paraspinous abscess at this level and anterior to the S1 sacral segment.      5.  Smaller intraosseous bone bone abscesses or areas of necrosis noted in the sacrum.      6.  There is also evidence of osteomyelitis involving the jose antonio and sacrum bilaterally along the course of the previous sacral fixation screw. There is a large 4 cm abscess noted in the right gluteal soft tissues in a smaller 3 cm chronic appearing    abscess in the left gluteal musculature.      7.  There is a 1.5 cm intraosseous abscess or area of necrosis in the right posterior ilium.      8.  There is a 3.3 cm centimeters multiloculated abscess anterior to the right sacroiliac joint.      MR-STEALTH BRAIN WITH & W/O   Final Result         1.  Innumerable subcentimeter brain metastases, many at the gray-white junction but also multiple noted throughout the basal ganglia and brainstem.      2.  Largest index lesion is noted in the right thalamus and has increased in size since previous exam and currently measures 9 mm and has a moderate amount of surrounding vasogenic edema.      US-EXTREMITY NON VASCULAR UNILATERAL RIGHT   Final Result      No right hip joint effusion. No soft tissue fluid collection.      MR-BRAIN-WITH & W/O   Final Result      1.  Innumerable supra and infratentorial enhancing nodules are again noted throughout the brain parenchyma with multiple lesions appearing somewhat larger and with increased enhancement. No associated diffusion restriction. Unchanged differential    considerations including bacterial or fungal infection versus metastatic disease.   2.  Mild diffuse cerebral substance loss.   3.  Mild microangiopathic  ischemic change versus demyelination or gliosis.      CT-NEEDLE BX-DEEP BONE   Final Result      CT GUIDED RIGHT ILIUM DEEP BONE BIOPSY. SAMPLES WERE SENT TO [facility]CRO[facility]Y FOR ANALYSIS.      CT-PELVIS WITH   Final Result         1.  No significant interval change in the size of the RIGHT iliac muscle fluid collections   2.  Hyperdense RIGHT gluteal lesion moderately suspicious for pseudoaneurysm with adjacent hematoma.   3.  Changes in the RIGHT iliac bone, BILATERAL sacrum and LEFT iliac bone suspicious for osteomyelitis   4.  Changes at L5-S1 suspicious for discitis osteomyelitis      CT-PELVIS WITH   Final Result      1.  Residual or recurrent abscess within the right iliacus muscle measuring 2.5 x 1.8 cm. It slightly smaller than on 5/28/2019      2.  Interval removal of hardware from the iliac bones and sacrum      3.  Lytic change of the right iliac bone and the sacrum. This could be related to hardware versus osteomyelitis.      4.  Subacute fractures of the right ilium, sacrum, and there is lucency within the left iliac bone that is likely from hardware      MR-BRAIN-WITH & W/O   Final Result      1.  Interval progression of supra and infratentorial intra-axial nodules and associated edema. This short-term interval progression favors infection over neoplasm though both remain considerations.   2.  Mild atrophy      DX-PORTABLE FLUOROSCOPY < 1 HOUR   Final Result         Portable fluoroscopy utilized for 2 minutes.      DX-PELVIS-1 OR 2 VIEWS   Final Result      Status post hardware removal from the right SI joint      DX-CHEST-PORTABLE (1 VIEW)   Final Result      Interstitial prominence could be due to pulmonary edema or hypoventilatory change.      DX-CHEST-LIMITED (1 VIEW)   Final Result      LEFT basilar underinflation atelectasis or pneumonia      CT-DRAIN-HEMATOMA - SEROMA   Final Result      CT-guided RIGHT pelvic fluid collection aspiration, laboratory evaluation pending               Assessment/Plan  * Brain lesion- (present on admission)   Assessment & Plan    Right-sided craniotomy for resection of superficial mass  6/28/2019  Continue Isoniazid, Rifampin, Ethambutol, Pyrazinamide per ID, discussed with ID they have reached out to [facility] [place] to discuss outpatient regimen  Continue Keppra for seizure prophylaxis, per ID consider restarting steroids if she has breakthrough seizures     Granulomatous disease - (present on admission)   Assessment & Plan    Isoniazid, Rifampin, Ethambutol, Pyrazinamide     Sepsis (HCC)- (present on admission)   Assessment & Plan    This is sepsis (without associated acute organ dysfunction).    Source - Right Iliac and Gluteus medius abscess  Resolved     Abscess of right iliac muscle and right gluteus medius muscle- (present on admission)   Assessment & Plan    CT guided pelvic aspiration 5/30/2019  Hardware removal, pelvis 6/5/2019  CT guided deep bone biopsy 6/19/2019  CT guided aspiration/biopsy of a R gluteal fluid collection/phlegmon 7/11/2019 - consistent with TB  Continue Isoniazid, Rifampin, Ethambutol, Pyrazinamide  Associated pain improving  Levaquin to continue for 9-12 months -- recommended by [facility] and ID       Hypokalemia- (present on admission)   Assessment & Plan    Replacing  following     Osteomyelitis ([facility])- (present on admission)   Assessment & Plan    Not osteo- actually disseminated TB  ID following     Pseudoaneurysm following procedure ([facility])- (present on admission)   Assessment & Plan      POD #5 s/p IR thrombin injection for thrombosed psedudoanerysm     Hypomagnesemia- (present on admission)   Assessment & Plan    Oral magnesium     Dysphagia- (present on admission)   Assessment & Plan    Dysphagia 3  Speech following     Essential hypertension- (present on admission)   Assessment & Plan    Continue Metoprolol, Losartan and Amlodipine as tolerated     COPD (chronic obstructive pulmonary disease) ([facility])- (present on  admission)   Assessment & Plan    Continue RT protocol  No s/o exacerbation     Non-severe protein-calorie malnutrition (HCC)- (present on admission)   Assessment & Plan    Nutrition consult       History of DVT (deep vein thrombosis)- (present on admission)   Assessment & Plan    Xarelto was held due to hip hematoma and rifampin  Consider starting coumadin in 1-2 weeks if h/h remains stable     RLS (restless legs syndrome)- (present on admission)   Assessment & Plan    Pramipexole     Chronic pain- (present on admission)   Assessment & Plan      Opiates were causing confusion   Has now been tapered off long acting opiates and doing well  Continue tapering off as tolerated       History of breast cancer- (present on admission)   Assessment & Plan    history left mastectomy and breast implant.            VTE prophylaxis: Heparin

## 2019-07-29 NOTE — PROGRESS NOTES
Assumed care of pt w/ bedside report from RN Florinda. Pt resting comfortably in bed, no complaints offered. Fall precautions/safety maintained. Hourly rounding. K 3.1 this AM, MD made aware at bedside. Will continue to monitor.

## 2019-07-29 NOTE — PROGRESS NOTES
Infectious Disease Progress Note    Author: Karen Garcia M.D. Date & Time of service: 7/29/2019  12:52 PM    Chief Complaint:  Brain abscess, gluteal abscess  Vertebral OM     Interval History:  68 y.o. female admitted 5/29/2019 as a transfer from [facility] since 4/30/2019. + diabetes, SANTOSH of right hip with hardware, and breast cancer who was originally admitted to [facility] on 03/08/2019 for right hip and pelvic pain.  Work-up revealed a right iliopsoas lesion, gluteal abscess, and mult brain abscesses     7/4 AF much more alert and conversant today-c/o left hip pain, unrelenting and would like parietal dressing changed. HA at surgical site.  Denies SE abx. No new neurodeficits  7/5 afebrile WBC 5.4.  Patient sleeping but arousable.  She denies any headache, nausea, vomiting.  7/6 afebrile WBC 6.5.  Patient sitting up in chair and having excruciating back pain  7/8 Pt has no specific complaints, she is mildly confused today.  She seems to be tolerating her medications with no significant lab abnormalities.  7/9 AF, O2 RA, Significant left hip pain today.  She does appear to be tolerating her antibiotics.  Ultrasound of bilateral lower extremities has been ordered.  7/10 AF, O2 RA,  Plan for IR drainage of sacral collection soon.   7/11 AF, O2 RA,  Pt continued on RIPE and ampho.  She is tolerating well overall, requiring some mag and potassium replacement.  She is complaining of severe pain in left hip again today.  Plan for biopsy later today.   7/12  IR biopsy of R gluteal abscess/hip. AF, O2 2 L NC,   tolerating antibiotics so far.  She is quite somnolent today but per nursing she was oriented x4 earlier  7/14 AF, O2 RA, more alert today, conversant and asking for advance in diet. Left hip pain ongoing but improved.   7/15 afebrile WBC 4.4.  Patient's speech is somewhat muffled but she is alert and responding to questions appropriately.  MRI shows worsening lesions and amphotericin stopped.  Nurse reports  waxing and waning mental status  7/16 afebrile.  Patient sleeping but arousable.  She is answer questions appropriately but then forgets that she is in the hospital.  7/17 afebrile WBC 4.9 patient is very drowsy as she states she did not sleep well yesterday.  She is asking about whether or not she has a fungal infection.  Plan of care discussed with patient.  7/19 afebrile patient continues to only endorse left hip pain exacerbated by sitting up in the chair.  She sat in the chair for 30 minutes yesterday.  Getting out of bed is limited secondary to left hip pain.  Mental status remains about the same with intermittent waxing and waning.  No new issues today per RN  7/21 afebrile patient is much more awake and alert today.  She is answering questions appropriately.  Yesterday she was complaining of some abdominal pain so CT scan was done and revealed interval enlargement of a hyperdense ovoid masslike structure posterior to the right ilium concerning for pseudoaneurysm.  7/22/2019 no fevers.  No new issues overnight.  Had her ultrasound today follow the results  7/23/2019-no fevers.  Ongoing pain.  The AFB cultures have come back positive for TB  7/24/2019 no fevers.  No new issues overnight.  Very anxious and agitated  7/25/2019 no fevers.  No new issues.  Says she feels slightly better.  7/26 AF WBC 4.5 tolerating meds well-wants cream to decrease size of surgical scar-has right-sided rib pain and joint pain  7/27 AF ambulating with assist with walker-no new complaints  7/28 AF same pain complaint-remains debilitated with intermittent confusion  7/29 AF WBC 4.2 no clinical change    Review of Systems:  Review of Systems   Constitutional: Negative for chills, fever and malaise/fatigue.   Respiratory: Negative for shortness of breath.    Gastrointestinal: Negative for abdominal pain, nausea and vomiting.   Musculoskeletal: Positive for joint pain.   Neurological: Negative for dizziness and headaches.        Hemodynamics:  Temp (24hrs), Av.6 °C (97.8 °F), Min:36.3 °C (97.4 °F), Max:36.7 °C (98 °F)  Temperature: 36.6 °C (97.8 °F)  Pulse  Av  Min: 49  Max: 195   Blood Pressure : 126/74       Physical Exam:  Physical Exam   Constitutional: No distress.   Elderly  Chronically ill-appearing   HENT:   Mouth/Throat: Oropharynx is clear and moist. No oropharyngeal exudate.   Right parietal surgical site healed   Eyes: Pupils are equal, round, and reactive to light. EOM are normal. No scleral icterus.   Neck: Neck supple. No JVD present.   Cardiovascular: Normal rate and regular rhythm.    Pulmonary/Chest: Effort normal. No stridor. No respiratory distress.   Abdominal: Soft. She exhibits no distension. There is no rebound and no guarding.   Musculoskeletal: She exhibits no edema.   Neurological: She is alert. No cranial nerve deficit.   Skin: Skin is warm. No rash noted. She is not diaphoretic.   Nursing note and vitals reviewed.      Meds:    Current Facility-Administered Medications:   •  magnesium chloride  •  levoFLOXacin  •  acetaminophen  •  hyoscyamine-maalox plus-lidocaine viscous  •  riFAMPin  •  thrombin  •  oxyCODONE immediate-release  •  temazepam  •  potassium chloride SA  •  losartan  •  pramipexole  •  senna-docusate  •  polyethylene glycol/lytes  •  magnesium hydroxide  •  gabapentin  •  ferrous sulfate  •  diphenhydrAMINE  •  heparin  •  pyridoxine  •  levETIRAcetam  •  isoniazid  •  ethambutol  •  pyrazinamide  •  Pharmacy Consult Request  •  labetalol  •  hydrALAZINE  •  lactobacillus rhamnosus  •  sucralfate  •  lidocaine  •  cyclobenzaprine  •  Respiratory Care per Protocol  •  amLODIPine  •  cinacalcet  •  atorvastatin  •  omeprazole  •  metoprolol  •  ondansetron  •  ondansetron    Labs:  Recent Labs      19   0341   WBC  4.2*   RBC  3.77*   HEMOGLOBIN  11.0*   HEMATOCRIT  36.0*   MCV  95.5   MCH  29.2   RDW  53.6*   PLATELETCT  230   MPV  8.9*   NEUTSPOLYS  53.50   LYMPHOCYTES  26.70    MONOCYTES  12.10   EOSINOPHILS  6.60   BASOPHILS  0.90     Recent Labs      07/29/19   0341   SODIUM  136   POTASSIUM  3.1*   CHLORIDE  103   CO2  25   GLUCOSE  142*   BUN  21     Recent Labs      07/29/19   0341   CREATININE  0.67       Imaging:  MRI on 7/14/2019  Impression:       1.  Innumerable nodular enhancing lesions throughout the brain parenchyma both the supra and infratentorial compartments predominantly near the gray-white junction. There has been interval increase in size of several of these lesions since previous exam.   These changes may be secondary to metastatic disease or granulomatous disease.    2.  Interval right parietal craniotomy with underlying elliptical postsurgical defect.    3.  Interval increase in size of index right thalamic lesion which has increased vasogenic edema since previous exam.    4.  Age-related cerebral atrophy.       Micro:  Results     Procedure Component Value Units Date/Time    Anaerobic Culture [[id number]] Collected:  07/11/19 1745    Order Status:  Completed Specimen:  Tissue Updated:  07/26/19 1017     Significant Indicator NEG     Source TISS     Site Right Hip Cores     Culture Result No Anaerobes isolated.    Narrative:       Radiology specimen Hold specimen 14 days per Dr. Junior    CULTURE TISSUE W/ GRM STAIN [[id number]] Collected:  07/11/19 1745    Order Status:  Completed Specimen:  Tissue Updated:  07/26/19 1017     Significant Indicator NEG     Source TISS     Site Right Hip Cores     Culture Result No growth at 14 days.     Gram Stain Result No organisms seen.    Narrative:       Radiology specimen Hold specimen 14 days per Dr. Junior    Fungal Culture [[id number]] Collected:  06/28/19 1403    Order Status:  Completed Specimen:  Tissue Updated:  07/23/19 0848     Significant Indicator NEG     Source TISS     Site Right Parietal Lesion     Culture Result No fungal growth.    Narrative:       Surgery Specimen    Anaerobic Culture [[id number]] Collected:   06/28/19 1403    Order Status:  Completed Specimen:  Tissue Updated:  07/23/19 0848     Significant Indicator NEG     Source TISS     Site Right Parietal Lesion     Culture Result No Anaerobes isolated.    Narrative:       Surgery Specimen    CULTURE TISSUE W/ GRM STAIN [[id number]] Collected:  06/28/19 1403    Order Status:  Completed Specimen:  Tissue Updated:  07/23/19 0848     Significant Indicator NEG     Source TISS     Site Right Parietal Lesion     Culture Result No growth at 72 hours.     Gram Stain Result Rare WBCs.  No organisms seen.      Narrative:       Surgery Specimen    AFB Culture [[id number]] Collected:  06/28/19 1403    Order Status:  Completed Specimen:  Tissue Updated:  07/23/19 0848     Significant Indicator NEG     Source TISS     Site Right Parietal Lesion     Culture Result Culture in progress.     AFB Smear Results No acid fast bacilli seen.    Narrative:       Surgery Specimen    Fungal Culture [[id number]] Collected:  06/19/19 0930    Order Status:  Completed Specimen:  Tissue Updated:  07/22/19 1949     Significant Indicator NEG     Source TISS     Site Right Hip deep bone     Culture Result No fungal growth.    Narrative:       CALL  [facility]  TEREZA tel. [phone],  Collected By:[id number] BEATRICE HARRIS  Bone biopsy right hip  Collected By:[id number] BEATRICE HARRIS  Right hip fluid collection  Collected By:[id number] BEATRICE HARRIS    AFB Culture [[id number]]  (Abnormal) Collected:  06/19/19 0930    Order Status:  Completed Specimen:  Tissue from Other Body Fluid Updated:  07/22/19 1949     Significant Indicator POS (POS)     Source TISS     Site Right Hip deep bone     Culture Result Acid fast bacilli detected by MGIT 960 instrument.  Identification to follow.   (A)     AFB Smear Results No acid fast bacilli seen.     Culture Result Mycobacterium tuberculosis complex  By DNA probe  Positive for M. tb complex  Negative for M. avium complex  Negative for M. gordonae  Negative for M. kansasii  Testing  "performed at:  [facility]  [address]  [place] [address]  [phone]   (A)    Narrative:       CALL  [facility]  TEREZA tel. [phone],  Collected By:[id number] BEATRICE HARRIS  Bone biopsy right hip  Collected By:[id number] BEATRICE HARRIS  Right hip fluid collection  Collected By:[id number] BEATRICE HARRIS    Fungal Smear [[id number]] Collected:  06/19/19 0930    Order Status:  Completed Specimen:  Tissue from Other Body Fluid Updated:  07/22/19 1949     Significant Indicator NEG     Source TISS     Site Right Hip deep bone     Fungal Smear Results No fungal elements seen.    Narrative:       CALL  [facility]  TEREZA tel. [phone],  Collected By:[id number] BEATRICE HARRIS  Bone biopsy right hip  Collected By:[id number] BEATRICE HARRIS  Right hip fluid collection  Collected By:[id number] BEATRICE HARRIS    CULTURE TISSUE W/ GRM STAIN [[id number]] Collected:  06/19/19 0930    Order Status:  Completed Specimen:  Tissue Updated:  07/22/19 1949     Significant Indicator NEG     Source TISS     Site Right Hip deep bone     Culture Result No growth at 72 hours.     Gram Stain Result No organisms seen.    Narrative:       CALL  [facility]  TEREZA tel. [phone],  Collected By:[id number] BEATRICE HARRIS  Bone biopsy right hip  Collected By:[id number] BEATRICE HARRIS  Right hip fluid collection  Collected By:[id number] BEATRICE HARRIS          Assessment:  Active Hospital Problems    Diagnosis   • *Brain lesion [G93.9]   • Granulomatous disease  [L92.9]   • Abscess of right iliac muscle and right gluteus medius muscle [L02.91]   • Pseudoaneurysm following procedure ([facility]) [I97.89, I72.9]   • Sepsis ([facility]) [A41.9]   • History of breast cancer [Z85.3]       Assessment/Plan:   Brain lesions  Tubercular  Encephalopathy, intermittent waxing and waning  MRI 4/23 \"supra and infratentorial brain parenchyma. Decrease in the size of the lesions. Interval reduction in the extent of the surrounding white matter edema consistent with improvement/treatment response of " "the most of the multifocal brain abscesses.  MRI 5/21 showed innumerable microabscesses vs mets  MRI on 6/8 with interval progression of supra and infratentorial intra--axial nodules and associated edema.  New right thalamus lesion. Radiology favors infection over neoplasm though both remain considerations (had been off abx)  Mult blood cultures neg  CHAS neg 3/15 and 5/24  MRI 6/22 worsening CNS lesions  S/p right craniotomy and resection of mass with biopsy on 6/28. Biopsy-per note fungal appearing elements seen per Neurosurgery-path   +necrotizing granulomas. 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 The collection within the right gluteal muscle has unchanged while the fluid collection within the right iliacis muscle is increased somewhat in size.\" 2.7 cm  Cultures 3/29 and 4/4 neg  MRI on 5/20/2019 - interval decrease in collection in R gluteal muscle and persistent multiloculated collection in R iliacus muscle.   CT 5/28 no change  s/p drainage on 5/30/2019-cultures negative  S/p removal hardware 6/5-no cultures done  CT on 6/8 with residual abscess in the right iliacus muscle, 2.5 x 1.8 cm, slightly smaller than prior  s/p CT-guided deep bone biopsy 6/19/2019.  Cx +Mtb  MRI 6/28 chronic discitis, diffuse OM L5, 3 and 4 cm abscesses right glute, 1.5 cm abscess or necrosis right posterior " ileum, 3.3 cm abscess right SI joint  Continue TB therapy as above with levofloxacin per [facility] center     +Qu[employer]  Path with necrotizing granulomas  AFB stain neg  AFB cultures + 6/19  Started RIPE +B6 7/3  Monitor for visual changes while on ethambutol.    Ophthalmology evaluation for baseline vision as well as color vision testing when able     Type 2 DM, chronic  Hemoglobin A1c 6.3   Keep BS under 150 to help control current infection      I have performed a physical exam and reviewed and updated ROS as of today.  In review of yesterday's note dated  7/28/2019, there are no changes except as documented above.            Will follow up at [facility] for meds and case management after discharge    Discussed with IM Dr Sorensen

## 2019-07-30 LAB
ANION GAP SERPL CALC-SCNC: 10 MMOL/L (ref 0–11.9)
BASOPHILS # BLD AUTO: 0.9 % (ref 0–1.8)
BASOPHILS # BLD: 0.03 K/UL (ref 0–0.12)
BUN SERPL-MCNC: 17 MG/DL (ref 8–22)
CALCIUM SERPL-MCNC: 10.2 MG/DL (ref 8.5–10.5)
CHLORIDE SERPL-SCNC: 104 MMOL/L (ref 96–112)
CO2 SERPL-SCNC: 26 MMOL/L (ref 20–33)
CREAT SERPL-MCNC: 0.69 MG/DL (ref 0.5–1.4)
EOSINOPHIL # BLD AUTO: 0.22 K/UL (ref 0–0.51)
EOSINOPHIL NFR BLD: 6.7 % (ref 0–6.9)
ERYTHROCYTE [DISTWIDTH] IN BLOOD BY AUTOMATED COUNT: 52.1 FL (ref 35.9–50)
GLUCOSE SERPL-MCNC: 101 MG/DL (ref 65–99)
HCT VFR BLD AUTO: 37.8 % (ref 37–47)
HGB BLD-MCNC: 11.9 G/DL (ref 12–16)
IMM GRANULOCYTES # BLD AUTO: 0.01 K/UL (ref 0–0.11)
IMM GRANULOCYTES NFR BLD AUTO: 0.3 % (ref 0–0.9)
LYMPHOCYTES # BLD AUTO: 1.05 K/UL (ref 1–4.8)
LYMPHOCYTES NFR BLD: 32.1 % (ref 22–41)
MCH RBC QN AUTO: 29.3 PG (ref 27–33)
MCHC RBC AUTO-ENTMCNC: 31.5 G/DL (ref 33.6–35)
MCV RBC AUTO: 93.1 FL (ref 81.4–97.8)
MONOCYTES # BLD AUTO: 0.42 K/UL (ref 0–0.85)
MONOCYTES NFR BLD AUTO: 12.8 % (ref 0–13.4)
NEUTROPHILS # BLD AUTO: 1.54 K/UL (ref 2–7.15)
NEUTROPHILS NFR BLD: 47.2 % (ref 44–72)
NRBC # BLD AUTO: 0 K/UL
NRBC BLD-RTO: 0 /100 WBC
PLATELET # BLD AUTO: 222 K/UL (ref 164–446)
PMV BLD AUTO: 9.2 FL (ref 9–12.9)
POTASSIUM SERPL-SCNC: 3.8 MMOL/L (ref 3.6–5.5)
RBC # BLD AUTO: 4.06 M/UL (ref 4.2–5.4)
SODIUM SERPL-SCNC: 140 MMOL/L (ref 135–145)
WBC # BLD AUTO: 3.3 K/UL (ref 4.8–10.8)

## 2019-07-30 PROCEDURE — A9270 NON-COVERED ITEM OR SERVICE: HCPCS | Performed by: INTERNAL MEDICINE

## 2019-07-30 PROCEDURE — 700111 HCHG RX REV CODE 636 W/ 250 OVERRIDE (IP): Performed by: INTERNAL MEDICINE

## 2019-07-30 PROCEDURE — 770001 HCHG ROOM/CARE - MED/SURG/GYN PRIV*

## 2019-07-30 PROCEDURE — 700102 HCHG RX REV CODE 250 W/ 637 OVERRIDE(OP): Performed by: HOSPITALIST

## 2019-07-30 PROCEDURE — 700102 HCHG RX REV CODE 250 W/ 637 OVERRIDE(OP): Performed by: INTERNAL MEDICINE

## 2019-07-30 PROCEDURE — A9270 NON-COVERED ITEM OR SERVICE: HCPCS | Performed by: FAMILY MEDICINE

## 2019-07-30 PROCEDURE — 99231 SBSQ HOSP IP/OBS SF/LOW 25: CPT | Performed by: INTERNAL MEDICINE

## 2019-07-30 PROCEDURE — 700102 HCHG RX REV CODE 250 W/ 637 OVERRIDE(OP): Performed by: FAMILY MEDICINE

## 2019-07-30 PROCEDURE — 99232 SBSQ HOSP IP/OBS MODERATE 35: CPT | Performed by: INTERNAL MEDICINE

## 2019-07-30 PROCEDURE — 700111 HCHG RX REV CODE 636 W/ 250 OVERRIDE (IP): Performed by: HOSPITALIST

## 2019-07-30 PROCEDURE — 36415 COLL VENOUS BLD VENIPUNCTURE: CPT

## 2019-07-30 PROCEDURE — 700101 HCHG RX REV CODE 250: Performed by: FAMILY MEDICINE

## 2019-07-30 PROCEDURE — A9270 NON-COVERED ITEM OR SERVICE: HCPCS | Performed by: HOSPITALIST

## 2019-07-30 PROCEDURE — 85025 COMPLETE CBC W/AUTO DIFF WBC: CPT

## 2019-07-30 PROCEDURE — 97535 SELF CARE MNGMENT TRAINING: CPT

## 2019-07-30 PROCEDURE — 92507 TX SP LANG VOICE COMM INDIV: CPT

## 2019-07-30 PROCEDURE — 80048 BASIC METABOLIC PNL TOTAL CA: CPT

## 2019-07-30 RX ADMIN — ISONIAZID 300 MG: 300 TABLET ORAL at 05:38

## 2019-07-30 RX ADMIN — RIFAMPIN 600 MG: 300 CAPSULE ORAL at 05:38

## 2019-07-30 RX ADMIN — PYRAZINAMIDE 1000 MG: 500 TABLET ORAL at 05:38

## 2019-07-30 RX ADMIN — PRAMIPEXOLE DIHYDROCHLORIDE 0.25 MG: 0.5 TABLET ORAL at 08:29

## 2019-07-30 RX ADMIN — PRAMIPEXOLE DIHYDROCHLORIDE 0.25 MG: 0.5 TABLET ORAL at 15:24

## 2019-07-30 RX ADMIN — CYCLOBENZAPRINE 10 MG: 10 TABLET, FILM COATED ORAL at 12:09

## 2019-07-30 RX ADMIN — PRAMIPEXOLE DIHYDROCHLORIDE 0.25 MG: 0.5 TABLET ORAL at 20:40

## 2019-07-30 RX ADMIN — MAGNESIUM 64 MG (MAGNESIUM CHLORIDE) TABLET,DELAYED RELEASE 64 MG: at 16:53

## 2019-07-30 RX ADMIN — ATORVASTATIN CALCIUM 10 MG: 10 TABLET, FILM COATED ORAL at 16:53

## 2019-07-30 RX ADMIN — Medication 1 CAPSULE: at 08:29

## 2019-07-30 RX ADMIN — LEVETIRACETAM 500 MG: 500 TABLET, FILM COATED ORAL at 16:53

## 2019-07-30 RX ADMIN — GABAPENTIN 400 MG: 400 CAPSULE ORAL at 12:09

## 2019-07-30 RX ADMIN — FERROUS SULFATE TAB 325 MG (65 MG ELEMENTAL FE) 325 MG: 325 (65 FE) TAB at 08:29

## 2019-07-30 RX ADMIN — HEPARIN SODIUM 5000 UNITS: 5000 INJECTION, SOLUTION INTRAVENOUS; SUBCUTANEOUS at 05:38

## 2019-07-30 RX ADMIN — POTASSIUM CHLORIDE 20 MEQ: 20 TABLET, EXTENDED RELEASE ORAL at 06:00

## 2019-07-30 RX ADMIN — OXYCODONE HYDROCHLORIDE 5 MG: 5 TABLET ORAL at 16:53

## 2019-07-30 RX ADMIN — SENNOSIDES, DOCUSATE SODIUM 2 TABLET: 50; 8.6 TABLET, FILM COATED ORAL at 16:53

## 2019-07-30 RX ADMIN — GABAPENTIN 400 MG: 400 CAPSULE ORAL at 05:39

## 2019-07-30 RX ADMIN — SUCRALFATE 1 G: 1 SUSPENSION ORAL at 12:09

## 2019-07-30 RX ADMIN — PYRIDOXINE HCL TAB 50 MG 100 MG: 50 TAB at 05:38

## 2019-07-30 RX ADMIN — OMEPRAZOLE 20 MG: 20 CAPSULE, DELAYED RELEASE ORAL at 05:38

## 2019-07-30 RX ADMIN — METOPROLOL TARTRATE 25 MG: 25 TABLET, FILM COATED ORAL at 05:37

## 2019-07-30 RX ADMIN — AMLODIPINE BESYLATE 10 MG: 5 TABLET ORAL at 05:39

## 2019-07-30 RX ADMIN — LIDOCAINE 2 PATCH: 50 PATCH CUTANEOUS at 12:09

## 2019-07-30 RX ADMIN — SENNOSIDES, DOCUSATE SODIUM 2 TABLET: 50; 8.6 TABLET, FILM COATED ORAL at 05:37

## 2019-07-30 RX ADMIN — HEPARIN SODIUM 5000 UNITS: 5000 INJECTION, SOLUTION INTRAVENOUS; SUBCUTANEOUS at 16:53

## 2019-07-30 RX ADMIN — OXYCODONE HYDROCHLORIDE 5 MG: 5 TABLET ORAL at 05:45

## 2019-07-30 RX ADMIN — LEVOFLOXACIN 750 MG: 750 TABLET, FILM COATED ORAL at 05:38

## 2019-07-30 RX ADMIN — TEMAZEPAM 15 MG: 15 CAPSULE ORAL at 20:40

## 2019-07-30 RX ADMIN — MAGNESIUM 64 MG (MAGNESIUM CHLORIDE) TABLET,DELAYED RELEASE 64 MG: at 05:39

## 2019-07-30 RX ADMIN — ETHAMBUTOL HYDROCHLORIDE 800 MG: 400 TABLET, FILM COATED ORAL at 05:38

## 2019-07-30 RX ADMIN — LEVETIRACETAM 500 MG: 500 TABLET, FILM COATED ORAL at 05:39

## 2019-07-30 RX ADMIN — GABAPENTIN 400 MG: 400 CAPSULE ORAL at 16:53

## 2019-07-30 RX ADMIN — SUCRALFATE 1 G: 1 SUSPENSION ORAL at 05:38

## 2019-07-30 RX ADMIN — LOSARTAN POTASSIUM 75 MG: 50 TABLET ORAL at 05:37

## 2019-07-30 RX ADMIN — CINACALCET HYDROCHLORIDE 60 MG: 30 TABLET, COATED ORAL at 05:38

## 2019-07-30 ASSESSMENT — ENCOUNTER SYMPTOMS
SENSORY CHANGE: 0
SPEECH CHANGE: 0
BLURRED VISION: 0
NECK PAIN: 0
BRUISES/BLEEDS EASILY: 0
BACK PAIN: 0
HEARTBURN: 0
WHEEZING: 0
CONSTIPATION: 0
HALLUCINATIONS: 0
DIARRHEA: 0
PHOTOPHOBIA: 0
NERVOUS/ANXIOUS: 0
DIZZINESS: 0
DOUBLE VISION: 0
ABDOMINAL PAIN: 0
CHILLS: 0
SORE THROAT: 0
HEADACHES: 0
COUGH: 0
FOCAL WEAKNESS: 0
NAUSEA: 0
WEAKNESS: 0
PALPITATIONS: 0
SHORTNESS OF BREATH: 0
VOMITING: 0
SEIZURES: 0
FEVER: 0

## 2019-07-30 ASSESSMENT — COGNITIVE AND FUNCTIONAL STATUS - GENERAL
TOILETING: A LITTLE
DAILY ACTIVITIY SCORE: 21
DRESSING REGULAR UPPER BODY CLOTHING: A LITTLE
SUGGESTED CMS G CODE MODIFIER DAILY ACTIVITY: CJ
HELP NEEDED FOR BATHING: A LITTLE

## 2019-07-30 ASSESSMENT — LIFESTYLE VARIABLES: SUBSTANCE_ABUSE: 0

## 2019-07-30 NOTE — CARE PLAN
Problem: Discharge Barriers/Planning  Goal: Patient's continuum of care needs will be met  Outcome: PROGRESSING SLOWER THAN EXPECTED  Social work collaborating with patient and family to develop safe discharge plan.     Problem: Skin Integrity  Goal: Risk for impaired skin integrity will decrease  Outcome: PROGRESSING AS EXPECTED  Pt skin CDI. Free of moisture. Will continue skin precautions.

## 2019-07-30 NOTE — PROGRESS NOTES
Infectious Disease Progress Note    Author: Karen Garcia M.D. Date & Time of service: 7/30/2019  2:37 PM    Chief Complaint:  Brain abscess, gluteal abscess  Vertebral OM     Interval History:  68 y.o. female admitted 5/29/2019 as a transfer from [facility] since 4/30/2019. + diabetes, SANTOSH of right hip with hardware, and breast cancer who was originally admitted to [facility] on 03/08/2019 for right hip and pelvic pain.  Work-up revealed a right iliopsoas lesion, gluteal abscess, and mult brain abscesses     7/4 AF much more alert and conversant today-c/o left hip pain, unrelenting and would like parietal dressing changed. HA at surgical site.  Denies SE abx. No new neurodeficits  7/5 afebrile WBC 5.4.  Patient sleeping but arousable.  She denies any headache, nausea, vomiting.  7/6 afebrile WBC 6.5.  Patient sitting up in chair and having excruciating back pain  7/8 Pt has no specific complaints, she is mildly confused today.  She seems to be tolerating her medications with no significant lab abnormalities.  7/9 AF, O2 RA, Significant left hip pain today.  She does appear to be tolerating her antibiotics.  Ultrasound of bilateral lower extremities has been ordered.  7/10 AF, O2 RA,  Plan for IR drainage of sacral collection soon.   7/11 AF, O2 RA,  Pt continued on RIPE and ampho.  She is tolerating well overall, requiring some mag and potassium replacement.  She is complaining of severe pain in left hip again today.  Plan for biopsy later today.   7/12  IR biopsy of R gluteal abscess/hip. AF, O2 2 L NC,   tolerating antibiotics so far.  She is quite somnolent today but per nursing she was oriented x4 earlier  7/14 AF, O2 RA, more alert today, conversant and asking for advance in diet. Left hip pain ongoing but improved.   7/15 afebrile WBC 4.4.  Patient's speech is somewhat muffled but she is alert and responding to questions appropriately.  MRI shows worsening lesions and amphotericin stopped.  Nurse reports  waxing and waning mental status  7/16 afebrile.  Patient sleeping but arousable.  She is answer questions appropriately but then forgets that she is in the hospital.  7/17 afebrile WBC 4.9 patient is very drowsy as she states she did not sleep well yesterday.  She is asking about whether or not she has a fungal infection.  Plan of care discussed with patient.  7/19 afebrile patient continues to only endorse left hip pain exacerbated by sitting up in the chair.  She sat in the chair for 30 minutes yesterday.  Getting out of bed is limited secondary to left hip pain.  Mental status remains about the same with intermittent waxing and waning.  No new issues today per RN  7/21 afebrile patient is much more awake and alert today.  She is answering questions appropriately.  Yesterday she was complaining of some abdominal pain so CT scan was done and revealed interval enlargement of a hyperdense ovoid masslike structure posterior to the right ilium concerning for pseudoaneurysm.  7/22/2019 no fevers.  No new issues overnight.  Had her ultrasound today follow the results  7/23/2019-no fevers.  Ongoing pain.  The AFB cultures have come back positive for TB  7/24/2019 no fevers.  No new issues overnight.  Very anxious and agitated  7/25/2019 no fevers.  No new issues.  Says she feels slightly better.  7/26 AF WBC 4.5 tolerating meds well-wants cream to decrease size of surgical scar-has right-sided rib pain and joint pain  7/27 AF ambulating with assist with walker-no new complaints  7/28 AF same pain complaint-remains debilitated with intermittent confusion  7/29 AF WBC 4.2 no clinical change  7/30 AF WBC 3.3 sleeping sounding-dw RN has been complaining of hip pain but able to ambulate    Review of Systems:  Review of Systems   Constitutional: Negative for chills and fever.   Gastrointestinal: Negative for nausea.   Musculoskeletal: Positive for joint pain.   Neurological: Negative for dizziness.       Hemodynamics:  Temp  (24hrs), Av.4 °C (97.5 °F), Min:36.1 °C (96.9 °F), Max:36.7 °C (98.1 °F)  Temperature: 36.7 °C (98.1 °F)  Pulse  Av.9  Min: 49  Max: 195   Blood Pressure : 115/64       Physical Exam:  Physical Exam   Constitutional: No distress.   Elderly  Chronically ill-appearing   HENT:   Mouth/Throat: Oropharynx is clear and moist. No oropharyngeal exudate.   Right parietal surgical site healed   Eyes: Right eye exhibits no discharge. Left eye exhibits no discharge.   Neck: Neck supple. No JVD present.   Cardiovascular: Normal rate and regular rhythm.    Pulmonary/Chest: Effort normal. No stridor. No respiratory distress.   Abdominal: Soft. She exhibits no distension.   Musculoskeletal: She exhibits no edema.   Skin: Skin is warm. No rash noted. She is not diaphoretic. No erythema.   Nursing note and vitals reviewed.      Meds:    Current Facility-Administered Medications:   •  magnesium chloride  •  levoFLOXacin  •  acetaminophen  •  hyoscyamine-maalox plus-lidocaine viscous  •  riFAMPin  •  thrombin  •  oxyCODONE immediate-release  •  temazepam  •  potassium chloride SA  •  losartan  •  pramipexole  •  senna-docusate  •  gabapentin  •  ferrous sulfate  •  diphenhydrAMINE  •  heparin  •  pyridoxine  •  levETIRAcetam  •  isoniazid  •  ethambutol  •  pyrazinamide  •  Pharmacy Consult Request  •  labetalol  •  lactobacillus rhamnosus  •  lidocaine  •  cyclobenzaprine  •  Respiratory Care per Protocol  •  amLODIPine  •  cinacalcet  •  atorvastatin  •  omeprazole  •  metoprolol  •  ondansetron  •  ondansetron    Labs:  Recent Labs      19   032   WBC  4.2*  3.3*   RBC  3.77*  4.06*   HEMOGLOBIN  11.0*  11.9*   HEMATOCRIT  36.0*  37.8   MCV  95.5  93.1   MCH  29.2  29.3   RDW  53.6*  52.1*   PLATELETCT  230  222   MPV  8.9*  9.2   NEUTSPOLYS  53.50  47.20   LYMPHOCYTES  26.70  32.10   MONOCYTES  12.10  12.80   EOSINOPHILS  6.60  6.70   BASOPHILS  0.90  0.90     Recent Labs      19   07/30/19   0322   SODIUM  136  140   POTASSIUM  3.1*  3.8   CHLORIDE  103  104   CO2  25  26   GLUCOSE  142*  101*   BUN  21  17     Recent Labs      07/29/19   0341  07/30/19   0322   CREATININE  0.67  0.69       Imaging:  MRI on 7/14/2019  Impression:       1.  Innumerable nodular enhancing lesions throughout the brain parenchyma both the supra and infratentorial compartments predominantly near the gray-white junction. There has been interval increase in size of several of these lesions since previous exam.   These changes may be secondary to metastatic disease or granulomatous disease.    2.  Interval right parietal craniotomy with underlying elliptical postsurgical defect.    3.  Interval increase in size of index right thalamic lesion which has increased vasogenic edema since previous exam.    4.  Age-related cerebral atrophy.       Micro:  Results     Procedure Component Value Units Date/Time    Anaerobic Culture [[id number]] Collected:  07/11/19 1745    Order Status:  Completed Specimen:  Tissue Updated:  07/26/19 1017     Significant Indicator NEG     Source TISS     Site Right Hip Cores     Culture Result No Anaerobes isolated.    Narrative:       Radiology specimen Hold specimen 14 days per Dr. Junior    CULTURE TISSUE W/ GRM STAIN [[id number]] Collected:  07/11/19 1745    Order Status:  Completed Specimen:  Tissue Updated:  07/26/19 1017     Significant Indicator NEG     Source TISS     Site Right Hip Cores     Culture Result No growth at 14 days.     Gram Stain Result No organisms seen.    Narrative:       Radiology specimen Hold specimen 14 days per Dr. Junior          Assessment:  Active Hospital Problems    Diagnosis   • *Brain lesion [G93.9]   • Granulomatous disease  [L92.9]   • Abscess of right iliac muscle and right gluteus medius muscle [L02.91]   • Pseudoaneurysm following procedure (HCC) [I97.89, I72.9]   • Sepsis (HCC) [A41.9]   • History of breast cancer [Z85.3]  "      Assessment/Plan:   Brain lesions  Tubercular  Encephalopathy, intermittent waxing and waning  MRI 4/23 \"supra and infratentorial brain parenchyma. Decrease in the size of the lesions. Interval reduction in the extent of the surrounding white matter edema consistent with improvement/treatment response of the most of the multifocal brain abscesses.  MRI 5/21 showed innumerable microabscesses vs mets  MRI on 6/8 with interval progression of supra and infratentorial intra--axial nodules and associated edema.  New right thalamus lesion. Radiology favors infection over neoplasm though both remain considerations (had been off abx)  Mult blood cultures neg  CHAS neg 3/15 and 5/24  MRI 6/22 worsening CNS lesions  S/p right craniotomy and resection of mass with biopsy on 6/28. Biopsy-per note fungal appearing elements seen per Neurosurgery-path   +necrotizing granulomas. 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 The collection within the right gluteal muscle has unchanged while the fluid collection within the right iliacis muscle is increased somewhat in size.\" 2.7 cm  Cultures 3/29 and 4/4 neg  MRI on 5/20/2019 - interval decrease in collection in R gluteal muscle and persistent multiloculated collection in R iliacus muscle.   CT 5/28 no change  s/p drainage on 5/30/2019-cultures negative  S/p " removal hardware 6/5-no cultures done  CT on 6/8 with residual abscess in the right iliacus muscle, 2.5 x 1.8 cm, slightly smaller than prior  s/p CT-guided deep bone biopsy 6/19/2019.  Cx +Mtb  MRI 6/28 chronic discitis, diffuse OM L5, 3 and 4 cm abscesses right glute, 1.5 cm abscess or necrosis right posterior ileum, 3.3 cm abscess right SI joint  Continue TB therapy as above with levofloxacin per [facility]     +QuantiGold  Path with necrotizing granulomas  AFB stain neg  AFB cultures + 6/19  Started RIPE +B6 7/3  Monitor for visual changes while on ethambutol.    Ophthalmology evaluation for baseline vision as well as color vision testing when able     Type 2 DM, chronic  Hemoglobin A1c 6.3   Keep BS under 150 to help control current infection  I have performed a physical exam and reviewed and updated ROS as of today.  In review of yesterday's note dated  7/29/2019, there are no changes except as documented above.      Will follow up at [facility] for meds and case management after discharge

## 2019-07-30 NOTE — PROGRESS NOTES
Pharmacy Pharmacotherapy Consult for LOS >30 days    Admit Date: 5/29/2019      Medications were reviewed for appropriateness and ongoing need.     Current Facility-Administered Medications   Medication Dose Route Frequency Provider Last Rate Last Dose   • magnesium chloride (MAG-64) delayed-release tablet 64 mg  64 mg Oral BID Alison Sorensen M.D.   64 mg at 07/30/19 0539   • levoFLOXacin (LEVAQUIN) tablet 750 mg  750 mg Oral DAILY Beatriz Johnson M.D.   750 mg at 07/30/19 0538   • acetaminophen (TYLENOL) tablet 500 mg  500 mg Oral BID PRN Alison Sorensen M.D.   500 mg at 07/28/19 0945   • hyoscyamine-maalox plus-lidocaine viscous (GI COCKTAIL) oral susp 15 mL  15 mL Oral Q6HRS PRN Alison Sorensen M.D.   15 mL at 07/25/19 0810   • riFAMPin (RIFADINE) capsule 600 mg  600 mg Oral DAILY Alison Sorensen M.D.   600 mg at 07/30/19 0538   • thrombin (THROMBINAR) 5000 UNIT vial   Topical Intra-Op Once PRN Primo Persaud M.D.   1,900 Units at 07/22/19 1704   • oxyCODONE immediate-release (ROXICODONE) tablet 5 mg  5 mg Oral Q6HRS PRN Alison Sorensen M.D.   5 mg at 07/30/19 0545   • temazepam (RESTORIL) capsule 15 mg  15 mg Oral QHS Moisés Loredo M.D.   15 mg at 07/29/19 2118   • potassium chloride SA (Kdur) tablet 20 mEq  20 mEq Oral DAILY Moisés Loredo M.D.   20 mEq at 07/30/19 0600   • losartan (COZAAR) tablet 75 mg  75 mg Oral Q DAY Calvin Woods M.D.   75 mg at 07/30/19 0537   • pramipexole (MIRAPEX) tablet 0.25 mg  0.25 mg Oral TID Jonathan Alatorre M.D.   0.25 mg at 07/30/19 0829   • senna-docusate (PERICOLACE or SENOKOT S) 8.6-50 MG per tablet 2 Tab  2 Tab Oral BID Jonathan Alatorre M.D.   2 Tab at 07/30/19 0537   • polyethylene glycol/lytes (MIRALAX) PACKET 1 Packet  1 Packet Oral QDAY PRN Jonathan Alatorre M.D.       • magnesium hydroxide (MILK OF MAGNESIA) suspension 30 mL  30 mL Oral QDAY PRN Jonathan Alatorre M.D.       • gabapentin (NEURONTIN) capsule 400 mg  400 mg Oral TID Jonathan Alatorre M.D.   400 mg at  07/30/19 0539   • ferrous sulfate tablet 325 mg  325 mg Oral QDAY with Breakfast Jonathan Alatorre M.D.   325 mg at 07/30/19 0829   • diphenhydrAMINE (BENADRYL ITCH) topical cream   Topical TID PRN Jonathan Alatorre M.D.       • heparin injection 5,000 Units  5,000 Units Subcutaneous Q12HRS Jonathan Alatrore M.D.   5,000 Units at 07/30/19 0538   • pyridoxine (VITAMIN B-6) tablet 100 mg  100 mg Oral DAILY Landen Aragon M.D.   100 mg at 07/30/19 0538   • levETIRAcetam (KEPPRA) tablet 500 mg  500 mg Oral BID Landen Aragon M.D.   500 mg at 07/30/19 0539   • isoniazid (NYDRAZID) tablet 300 mg  300 mg Oral DAILY Karen Garcia M.D.   300 mg at 07/30/19 0538   • ethambutol (MYAMBUTOL) tablet 800 mg  800 mg Oral DAILY Karen Garcia M.D.   800 mg at 07/30/19 0538   • pyrazinamide tablet 1,000 mg  1,000 mg Oral DAILY Karen Garcia M.D.   1,000 mg at 07/30/19 0538   • Pharmacy Consult Request ...Pain Management Review 1 Each  1 Each Other PHARMACY TO DOSE Cassandra Hough M.D.       • labetalol (NORMODYNE,TRANDATE) injection 10 mg  10 mg Intravenous Q HOUR PRN Cassandra Hough M.D.   10 mg at 07/15/19 0539   • hydrALAZINE (APRESOLINE) injection 10 mg  10 mg Intravenous Q HOUR PRN Cassandra Hough M.D.   10 mg at 07/16/19 0740   • lactobacillus rhamnosus (CULTURELLE) capsule 1 Cap  1 Cap Oral QDAY with Breakfast Cassandra Hough M.D.   1 Cap at 07/30/19 0829   • sucralfate (CARAFATE) 1 GM/10ML suspension 1 g  1 g Oral Q6HRS Cassandra Hough M.D.   1 g at 07/30/19 0538   • lidocaine (LIDODERM) 5 % 2 Patch  2 Patch Transdermal Q24HR Cassandra Hough M.D.   2 Patch at 07/29/19 1147   • cyclobenzaprine (FLEXERIL) tablet 10 mg  10 mg Oral TID PRN Cassandra Hough M.D.   10 mg at 07/29/19 0249   • Respiratory Care per Protocol   Nebulization Continuous RT SISSY [place].P.R.N.       • amLODIPine (NORVASC) tablet 10 mg  10 mg Oral Q DAY Cassandra Hough M.D.   10 mg at 07/30/19  0539   • cinacalcet (SENSIPAR) tablet 60 mg  60 mg Oral DAILY Cassandra Hough M.D.   60 mg at 07/30/19 0538   • atorvastatin (LIPITOR) tablet 10 mg  10 mg Oral Q EVENING Cassandra Hough M.D.   10 mg at 07/29/19 1710   • omeprazole (PRILOSEC) capsule 20 mg  20 mg Oral DAILY Cassandra Hough M.D.   20 mg at 07/30/19 0538   • metoprolol (LOPRESSOR) tablet 25 mg  25 mg Oral TWICE DAILY Cassandra Hough M.D.   25 mg at 07/30/19 0537   • ondansetron (ZOFRAN) syringe/vial injection 4 mg  4 mg Intravenous Q4HRS PRN Cassandra Hough M.D.   4 mg at 07/17/19 1736   • ondansetron (ZOFRAN ODT) dispertab 4 mg  4 mg Oral Q4HRS PRN Cassandra Hough M.D.   4 mg at 07/18/19 0440       Recommendations:    1. D/C hydralazine PRN. BP wnl over last couple of days on scheduled medications. Hydralazine last used 7/16/19. Labetalol is another PRN medication Pt could use if required.  2. D/C Milk of Magnesium PRN. Medication prescribed on 7/12/19. Medication has not been used.   3. D/C Miralax PRN.  Medication prescribed on 7/12/19. Medication has not been used.   4. D/C Sucralfate. Added to Pt Omeprazole home dose on 6/15/19. Not sure medication is needed at this time.    Discussed recommendations with Dr. Randolph.  agreed with all recommendations.     Alex Santos, Pharmacist Intern      Addendum:  Agree with the above recommendations.    Michelle Jefferson, PharmD., BCPS

## 2019-07-30 NOTE — THERAPY
"Occupational Therapy Treatment completed with focus on ADLs and ADL transfers.  Functional Status: LB dressing min A, transfers with FWW min A, standing grooming min A  Plan of Care: Will benefit from Occupational Therapy 3 times per week  Discharge Recommendations:  Equipment Will Continue to Assess for Equipment Needs. Post-acute therapy: Recommend post-acute placement for additional occupational therapy services prior to discharge home. Patient can tolerate post-acute therapies at a 5x/week frequency.    See \"Rehab Therapy-Acute\" Patient Summary Report for complete documentation.     Pt seen for OT tx to include: sock management, mobilization to bathroom using FWW, toilet transfers, standing grooming, up to chair for dinner. Pt required verbal and tactile cues to safely control FWW during mobility. Demos increased functional mobility & participation this session, however with standing and sitting on hard surfaces (toilet seat), continues to c/o B hip pain and begins to hyperventilate and grunt incessantly. Ongoing cues for breath control. Once in supported seat, pt calmed and was able to tolerate sitting. Overall, looks much better from OT perspective this session. Will continue to follow.   "

## 2019-07-30 NOTE — PROGRESS NOTES
Hospital Medicine Daily Progress Note    Date of Service  7/30/2019    Chief Complaint  68 y.o. female admitted 5/29/2019 with right iliac and gluteus abscess    Hospital Course  Patient is a 68 year old with history of breast cancer, copd, dm, gerd, dl and htn who was admitted with right iliac gluteus abscess.  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    Interval Problem Update  Alert oriented x3.  Complaining of some left buttock pain worsening w  movements.  Afebrile, no overnight events.    Consultants/Specialty  ID  Neurosurgery  Ortho    Code Status  Full    Disposition  snf pending, would like to go to snf near family in [place]    Review of Systems  Review of Systems   Constitutional: Negative for chills, fever and malaise/fatigue.   HENT: Negative for ear pain, hearing loss, sore throat and tinnitus.    Eyes: Negative for blurred vision, double vision and photophobia.   Respiratory: Negative for cough, shortness of breath and wheezing.    Cardiovascular: Negative for chest pain, palpitations and leg swelling.   Gastrointestinal: Negative for abdominal pain, constipation, diarrhea, heartburn, nausea and vomiting.   Genitourinary: Negative for dysuria, frequency and urgency.   Musculoskeletal: Negative for back pain, joint pain and neck pain.   Skin: Negative for rash.   Neurological: Negative for dizziness, sensory change, speech change, focal weakness, seizures, weakness and headaches.   Endo/Heme/Allergies: Negative for environmental allergies. Does not bruise/bleed easily.   Psychiatric/Behavioral: Negative for hallucinations, substance abuse and suicidal ideas. The patient is not nervous/anxious.         Physical Exam  Temp:  [36.1 °C (96.9 °F)-36.7 °C (98.1 °F)] 36.7 °C (98.1 °F)  Pulse:  [71-88] 71  Resp:  [16-18] 16  BP: (111-121)/(56-65) 115/64  SpO2:  [96 %-98 %] 98 %    Physical  Exam   Constitutional: She appears well-developed and well-nourished. No distress.   HENT:   Head: Normocephalic and atraumatic.   Mouth/Throat: Oropharynx is clear and moist.   Right parietal lobe incision site CDI   Eyes: Pupils are equal, round, and reactive to light. Conjunctivae are normal.   Neck: Normal range of motion. Neck supple.   Cardiovascular: Normal rate, regular rhythm, normal heart sounds and intact distal pulses.  Exam reveals no gallop and no friction rub.    No murmur heard.  Pulmonary/Chest: Effort normal and breath sounds normal. No respiratory distress. She has no wheezes. She has no rales. She exhibits no tenderness.   Abdominal: Soft. Bowel sounds are normal. She exhibits no distension and no mass. There is no tenderness. There is no rebound and no guarding.   Musculoskeletal: Normal range of motion. She exhibits no edema or tenderness.   Neurological: She is alert. She has normal reflexes. No cranial nerve deficit. She exhibits normal muscle tone. Coordination normal.   Skin: Skin is warm and dry. No rash noted. She is not diaphoretic. No erythema. No pallor.   Psychiatric: Cognition and memory are impaired. She exhibits abnormal recent memory.   Nursing note and vitals reviewed.      Fluids  No intake or output data in the 24 hours ending 07/30/19 1428    Laboratory  Recent Labs      07/29/19   0341  07/30/19   0322   WBC  4.2*  3.3*   RBC  3.77*  4.06*   HEMOGLOBIN  11.0*  11.9*   HEMATOCRIT  36.0*  37.8   MCV  95.5  93.1   MCH  29.2  29.3   MCHC  30.6*  31.5*   RDW  53.6*  52.1*   PLATELETCT  230  222   MPV  8.9*  9.2     Recent Labs      07/29/19   0341  07/30/19   0322   SODIUM  136  140   POTASSIUM  3.1*  3.8   CHLORIDE  103  104   CO2  25  26   GLUCOSE  142*  101*   BUN  21  17   CREATININE  0.67  0.69   CALCIUM  9.8  10.2                   Imaging  US-EXTREMITY ARTERY LOWER UNILAT RIGHT   Final Result      Right gluteal pseudoaneurysm measuring 3.65 x 2.24 x 2.31 cm.      IR-INJ-EXT  PSEUDOANEURYSM PERC   Final Result      1.  Ultrasound guided thrombin injection for treatment of a RIGHT gluteal 3.6 cm pseudoaneurysm.      CTA ABDOMEN PELVIS W & W/O POST PROCESS   Final Result      1.  Interval enlargement of a hyperdense ovoid masslike structure immediately posterior to the right ilium, possibly representing a pseudoaneurysm.   2.  Stable thin-walled apparent fluid-filled structure immediately deep to the right ilium.   3.   Minimal retroperitoneal adenopathy, grossly stable.   4.  Cholelithiasis.      MR-BRAIN-WITH & W/O   Final Result      1.  Innumerable nodular enhancing lesions throughout the brain parenchyma both the supra and infratentorial compartments predominantly near the gray-white junction. There has been interval increase in size of several of these lesions since previous exam.    These changes may be secondary to metastatic disease or granulomatous disease.      2.  Interval right parietal craniotomy with underlying elliptical postsurgical defect.      3.  Interval increase in size of index right thalamic lesion which has increased vasogenic edema since previous exam.      4.  Age-related cerebral atrophy.      IR-PICC LINE PLACEMENT W/ GUIDANCE > AGE 5   Final Result                  Ultrasound-guided PICC placement performed by qualified nursing staff as    above.          CT-NEEDLE BX-ABD-RETROPERITONL   Final Result      1.  CT GUIDED biopsy of a right pelvic phlegmon.      2. Significant interval enlargement of a right gluteal pseudoaneurysm. CTA and consideration for possible intervention either with direct thrombin injection or endovascular treatment was suggested. This was conveyed to Dr. Alatorre on 7/11/2019 via [place]    text at 1750 hours.      [id number] VIEW   Final Result         1.  Moderate stool in the colon suggests changes of constipation, otherwise nonspecific bowel gas pattern   2.  Atherosclerosis      US-EXTREMITY VENOUS LOWER BILAT   Final Result      IR-US  GUIDED PIV   Final Result    Ultrasound-guided PERIPHERAL IV INSERTION performed by    qualified nursing staff as above.            MR-LUMBAR SPINE-WITH & W/O   Final Result         1.  Grade 2-3 spondylolisthesis at the L5-S1 level with bilateral spondylolysis of the pars interarticularis. This causes severe bilateral neural foraminal stenosis at this level.      2.  Interval removal of posterior fusion hardware at the lumbosacral junction.      3.  Diffuse osteomyelitis involving the L5 vertebral body and the first 3 sacral segments.      4.  Chronic discitis at the L5-S1 level with anterior paraspinous abscess at this level and anterior to the S1 sacral segment.      5.  Smaller intraosseous bone bone abscesses or areas of necrosis noted in the sacrum.      6.  There is also evidence of osteomyelitis involving the jose antonio and sacrum bilaterally along the course of the previous sacral fixation screw. There is a large 4 cm abscess noted in the right gluteal soft tissues in a smaller 3 cm chronic appearing    abscess in the left gluteal musculature.      7.  There is a 1.5 cm intraosseous abscess or area of necrosis in the right posterior ilium.      8.  There is a 3.3 cm centimeters multiloculated abscess anterior to the right sacroiliac joint.      MR-STEALTH BRAIN WITH & W/O   Final Result         1.  Innumerable subcentimeter brain metastases, many at the gray-white junction but also multiple noted throughout the basal ganglia and brainstem.      2.  Largest index lesion is noted in the right thalamus and has increased in size since previous exam and currently measures 9 mm and has a moderate amount of surrounding vasogenic edema.      US-EXTREMITY NON VASCULAR UNILATERAL RIGHT   Final Result      No right hip joint effusion. No soft tissue fluid collection.      MR-BRAIN-WITH & W/O   Final Result      1.  Innumerable supra and infratentorial enhancing nodules are again noted throughout the brain parenchyma with  multiple lesions appearing somewhat larger and with increased enhancement. No associated diffusion restriction. Unchanged differential    considerations including bacterial or fungal infection versus metastatic disease.   2.  Mild diffuse cerebral substance loss.   3.  Mild microangiopathic ischemic change versus demyelination or gliosis.      CT-NEEDLE BX-DEEP BONE   Final Result      CT GUIDED RIGHT ILIUM DEEP BONE BIOPSY. SAMPLES WERE SENT TO MICROBIOLOGY FOR ANALYSIS.      CT-PELVIS WITH   Final Result         1.  No significant interval change in the size of the RIGHT iliac muscle fluid collections   2.  Hyperdense RIGHT gluteal lesion moderately suspicious for pseudoaneurysm with adjacent hematoma.   3.  Changes in the RIGHT iliac bone, BILATERAL sacrum and LEFT iliac bone suspicious for osteomyelitis   4.  Changes at L5-S1 suspicious for discitis osteomyelitis      CT-PELVIS WITH   Final Result      1.  Residual or recurrent abscess within the right iliacus muscle measuring 2.5 x 1.8 cm. It slightly smaller than on 5/28/2019      2.  Interval removal of hardware from the iliac bones and sacrum      3.  Lytic change of the right iliac bone and the sacrum. This could be related to hardware versus osteomyelitis.      4.  Subacute fractures of the right ilium, sacrum, and there is lucency within the left iliac bone that is likely from hardware      MR-BRAIN-WITH & W/O   Final Result      1.  Interval progression of supra and infratentorial intra-axial nodules and associated edema. This short-term interval progression favors infection over neoplasm though both remain considerations.   2.  Mild atrophy      DX-PORTABLE FLUOROSCOPY < 1 HOUR   Final Result         Portable fluoroscopy utilized for 2 minutes.      DX-PELVIS-1 OR 2 VIEWS   Final Result      Status post hardware removal from the right SI joint      DX-CHEST-PORTABLE (1 VIEW)   Final Result      Interstitial prominence could be due to pulmonary edema or  hypoventilatory change.      DX-CHEST-LIMITED (1 VIEW)   Final Result      LEFT basilar underinflation atelectasis or pneumonia      CT-DRAIN-HEMATOMA - SEROMA   Final Result      CT-guided RIGHT pelvic fluid collection aspiration, laboratory evaluation pending              Assessment/Plan  * Brain lesion- (present on admission)   Assessment & Plan    Right-sided craniotomy for resection of superficial mass  6/28/2019  Continue Isoniazid, Rifampin, Ethambutol, Pyrazinamide per ID, discussed with ID they have reached out to [facility] to discuss outpatient regimen  Continue Keppra for seizure prophylaxis, per ID consider restarting steroids if she has breakthrough seizures  No seizures observed     Granulomatous disease - (present on admission)   Assessment & Plan    Isoniazid, Rifampin, Ethambutol, Pyrazinamide     Sepsis (HCC)- (present on admission)   Assessment & Plan    This is sepsis (without associated acute organ dysfunction).    Source - Right Iliac and Gluteus medius abscess  Resolved     Abscess of right iliac muscle and right gluteus medius muscle- (present on admission)   Assessment & Plan    CT guided pelvic aspiration 5/30/2019  Hardware removal, pelvis 6/5/2019  CT guided deep bone biopsy 6/19/2019  CT guided aspiration/biopsy of a R gluteal fluid collection/phlegmon 7/11/2019 - consistent with TB  Continue Isoniazid, Rifampin, Ethambutol, Pyrazinamide  Associated pain improving  Levaquin to continue for 9-12 months -- recommended by [facility] and ID       Hypokalemia- (present on admission)   Assessment & Plan    Replacing  following     Osteomyelitis (HCC)- (present on admission)   Assessment & Plan    Not osteo- actually disseminated TB  ID following     Pseudoaneurysm following procedure (HCC)- (present on admission)   Assessment & Plan      POD #5 s/p IR thrombin injection for thrombosed psedudoanerysm     Hypomagnesemia- (present on admission)   Assessment & Plan    Oral magnesium      Dysphagia- (present on admission)   Assessment & Plan    Dysphagia 3  Speech following     Essential hypertension- (present on admission)   Assessment & Plan    Continue Metoprolol, Losartan and Amlodipine as tolerated     COPD (chronic obstructive pulmonary disease) (HCC)- (present on admission)   Assessment & Plan    Continue RT protocol  No s/o exacerbation     Non-severe protein-calorie malnutrition (HCC)- (present on admission)   Assessment & Plan    Nutrition consult       History of DVT (deep vein thrombosis)- (present on admission)   Assessment & Plan    Xarelto was held due to hip hematoma and rifampin  Consider starting coumadin in 1-2 weeks if h/h remains stable     RLS (restless legs syndrome)- (present on admission)   Assessment & Plan    Pramipexole     Chronic pain- (present on admission)   Assessment & Plan      Opiates were causing confusion   Has now been tapered off long acting opiates and doing well  Continue tapering off as tolerated       History of breast cancer- (present on admission)   Assessment & Plan    history left mastectomy and breast implant.            VTE prophylaxis: Heparin

## 2019-07-30 NOTE — PROGRESS NOTES
Pt AAOx3, intermittent confusion.   No acute changes, VSS.   Complaining of bilateral hip pain, medicated per MAR>   Encouraging ambulation. Hourly rounding in place.   Will continue to monitor.

## 2019-07-30 NOTE — CARE PLAN
Problem: Safety  Goal: Will remain free from injury  Outcome: PROGRESSING AS EXPECTED  Call light within reach. Bed locked and in lowest position. Bed alarm on. Room near nurses station. Rounding maintained.     Problem: Bowel/Gastric:  Goal: Normal bowel function is maintained or improved  Outcome: PROGRESSING AS EXPECTED  BM 7/29.

## 2019-07-30 NOTE — DISCHARGE PLANNING
Agency/Facility Name: [facility]   Spoke To: Gela  Outcome: Patient declined; facility feels patients will be LTC and there are no beds available.    Agency/Facility Name: [facility]   Spoke To: Admissions   Outcome: Requesting referral be refaxed. [facility] refaxed referral at [phone].

## 2019-07-30 NOTE — PROGRESS NOTES
Bedside report given to RN Nicolás. POC discussed, all questions answered. Safety precautions in place. Pt offers no complaints. Care released.

## 2019-07-30 NOTE — PROGRESS NOTES
Pt A/Ox3 intermittent confusion. Fall precautions in place. Bed alarm on. Out of bed with 1A and walker. C/o bilateral leg pain. Medicated per MAR.

## 2019-07-31 LAB
BASOPHILS # BLD AUTO: 1 % (ref 0–1.8)
BASOPHILS # BLD: 0.03 K/UL (ref 0–0.12)
EOSINOPHIL # BLD AUTO: 0.23 K/UL (ref 0–0.51)
EOSINOPHIL NFR BLD: 7.8 % (ref 0–6.9)
ERYTHROCYTE [DISTWIDTH] IN BLOOD BY AUTOMATED COUNT: 52.1 FL (ref 35.9–50)
HCT VFR BLD AUTO: 36.9 % (ref 37–47)
HGB BLD-MCNC: 11.9 G/DL (ref 12–16)
IMM GRANULOCYTES # BLD AUTO: 0.03 K/UL (ref 0–0.11)
IMM GRANULOCYTES NFR BLD AUTO: 1 % (ref 0–0.9)
LYMPHOCYTES # BLD AUTO: 1.02 K/UL (ref 1–4.8)
LYMPHOCYTES NFR BLD: 34.5 % (ref 22–41)
MCH RBC QN AUTO: 30.3 PG (ref 27–33)
MCHC RBC AUTO-ENTMCNC: 32.2 G/DL (ref 33.6–35)
MCV RBC AUTO: 93.9 FL (ref 81.4–97.8)
MONOCYTES # BLD AUTO: 0.35 K/UL (ref 0–0.85)
MONOCYTES NFR BLD AUTO: 11.8 % (ref 0–13.4)
NEUTROPHILS # BLD AUTO: 1.3 K/UL (ref 2–7.15)
NEUTROPHILS NFR BLD: 43.9 % (ref 44–72)
NRBC # BLD AUTO: 0 K/UL
NRBC BLD-RTO: 0 /100 WBC
PLATELET # BLD AUTO: 210 K/UL (ref 164–446)
PMV BLD AUTO: 9.5 FL (ref 9–12.9)
RBC # BLD AUTO: 3.93 M/UL (ref 4.2–5.4)
WBC # BLD AUTO: 3 K/UL (ref 4.8–10.8)

## 2019-07-31 PROCEDURE — A9270 NON-COVERED ITEM OR SERVICE: HCPCS | Performed by: INTERNAL MEDICINE

## 2019-07-31 PROCEDURE — 700102 HCHG RX REV CODE 250 W/ 637 OVERRIDE(OP): Performed by: INTERNAL MEDICINE

## 2019-07-31 PROCEDURE — A9270 NON-COVERED ITEM OR SERVICE: HCPCS | Performed by: FAMILY MEDICINE

## 2019-07-31 PROCEDURE — 99232 SBSQ HOSP IP/OBS MODERATE 35: CPT | Performed by: INTERNAL MEDICINE

## 2019-07-31 PROCEDURE — 700102 HCHG RX REV CODE 250 W/ 637 OVERRIDE(OP): Performed by: FAMILY MEDICINE

## 2019-07-31 PROCEDURE — 700111 HCHG RX REV CODE 636 W/ 250 OVERRIDE (IP): Performed by: INTERNAL MEDICINE

## 2019-07-31 PROCEDURE — 770001 HCHG ROOM/CARE - MED/SURG/GYN PRIV*

## 2019-07-31 PROCEDURE — 85025 COMPLETE CBC W/AUTO DIFF WBC: CPT

## 2019-07-31 PROCEDURE — 36415 COLL VENOUS BLD VENIPUNCTURE: CPT

## 2019-07-31 PROCEDURE — 700101 HCHG RX REV CODE 250: Performed by: FAMILY MEDICINE

## 2019-07-31 PROCEDURE — 99231 SBSQ HOSP IP/OBS SF/LOW 25: CPT | Performed by: INTERNAL MEDICINE

## 2019-07-31 RX ADMIN — POTASSIUM CHLORIDE 20 MEQ: 20 TABLET, EXTENDED RELEASE ORAL at 06:00

## 2019-07-31 RX ADMIN — ETHAMBUTOL HYDROCHLORIDE 800 MG: 400 TABLET, FILM COATED ORAL at 06:00

## 2019-07-31 RX ADMIN — LEVOFLOXACIN 750 MG: 750 TABLET, FILM COATED ORAL at 06:00

## 2019-07-31 RX ADMIN — MAGNESIUM 64 MG (MAGNESIUM CHLORIDE) TABLET,DELAYED RELEASE 64 MG: at 06:00

## 2019-07-31 RX ADMIN — CINACALCET HYDROCHLORIDE 60 MG: 30 TABLET, COATED ORAL at 06:00

## 2019-07-31 RX ADMIN — GABAPENTIN 400 MG: 400 CAPSULE ORAL at 06:00

## 2019-07-31 RX ADMIN — SENNOSIDES, DOCUSATE SODIUM 2 TABLET: 50; 8.6 TABLET, FILM COATED ORAL at 17:36

## 2019-07-31 RX ADMIN — LIDOCAINE 2 PATCH: 50 PATCH CUTANEOUS at 11:07

## 2019-07-31 RX ADMIN — PRAMIPEXOLE DIHYDROCHLORIDE 0.25 MG: 0.5 TABLET ORAL at 20:21

## 2019-07-31 RX ADMIN — SENNOSIDES, DOCUSATE SODIUM 2 TABLET: 50; 8.6 TABLET, FILM COATED ORAL at 06:00

## 2019-07-31 RX ADMIN — PRAMIPEXOLE DIHYDROCHLORIDE 0.25 MG: 0.5 TABLET ORAL at 15:13

## 2019-07-31 RX ADMIN — LEVETIRACETAM 500 MG: 500 TABLET, FILM COATED ORAL at 17:36

## 2019-07-31 RX ADMIN — OXYCODONE HYDROCHLORIDE 5 MG: 5 TABLET ORAL at 11:07

## 2019-07-31 RX ADMIN — LEVETIRACETAM 500 MG: 500 TABLET, FILM COATED ORAL at 06:00

## 2019-07-31 RX ADMIN — PYRAZINAMIDE 1000 MG: 500 TABLET ORAL at 06:00

## 2019-07-31 RX ADMIN — ATORVASTATIN CALCIUM 10 MG: 10 TABLET, FILM COATED ORAL at 17:36

## 2019-07-31 RX ADMIN — PYRIDOXINE HCL TAB 50 MG 100 MG: 50 TAB at 06:00

## 2019-07-31 RX ADMIN — METOPROLOL TARTRATE 25 MG: 25 TABLET, FILM COATED ORAL at 06:00

## 2019-07-31 RX ADMIN — MAGNESIUM 64 MG (MAGNESIUM CHLORIDE) TABLET,DELAYED RELEASE 64 MG: at 17:38

## 2019-07-31 RX ADMIN — GABAPENTIN 400 MG: 400 CAPSULE ORAL at 17:36

## 2019-07-31 RX ADMIN — ISONIAZID 300 MG: 300 TABLET ORAL at 06:00

## 2019-07-31 RX ADMIN — LOSARTAN POTASSIUM 75 MG: 50 TABLET ORAL at 06:00

## 2019-07-31 RX ADMIN — TEMAZEPAM 15 MG: 15 CAPSULE ORAL at 21:00

## 2019-07-31 RX ADMIN — FERROUS SULFATE TAB 325 MG (65 MG ELEMENTAL FE) 325 MG: 325 (65 FE) TAB at 08:06

## 2019-07-31 RX ADMIN — HEPARIN SODIUM 5000 UNITS: 5000 INJECTION, SOLUTION INTRAVENOUS; SUBCUTANEOUS at 17:36

## 2019-07-31 RX ADMIN — PRAMIPEXOLE DIHYDROCHLORIDE 0.25 MG: 0.5 TABLET ORAL at 08:06

## 2019-07-31 RX ADMIN — OMEPRAZOLE 20 MG: 20 CAPSULE, DELAYED RELEASE ORAL at 06:00

## 2019-07-31 RX ADMIN — GABAPENTIN 400 MG: 400 CAPSULE ORAL at 11:07

## 2019-07-31 RX ADMIN — AMLODIPINE BESYLATE 10 MG: 5 TABLET ORAL at 06:00

## 2019-07-31 RX ADMIN — RIFAMPIN 600 MG: 300 CAPSULE ORAL at 06:00

## 2019-07-31 RX ADMIN — METOPROLOL TARTRATE 25 MG: 25 TABLET, FILM COATED ORAL at 17:36

## 2019-07-31 RX ADMIN — Medication 1 CAPSULE: at 08:06

## 2019-07-31 RX ADMIN — OXYCODONE HYDROCHLORIDE 5 MG: 5 TABLET ORAL at 17:38

## 2019-07-31 RX ADMIN — HEPARIN SODIUM 5000 UNITS: 5000 INJECTION, SOLUTION INTRAVENOUS; SUBCUTANEOUS at 06:00

## 2019-07-31 ASSESSMENT — ENCOUNTER SYMPTOMS
HEADACHES: 0
BRUISES/BLEEDS EASILY: 0
POLYDIPSIA: 0
SPUTUM PRODUCTION: 0
VOMITING: 0
PHOTOPHOBIA: 0
HEARTBURN: 0
DIZZINESS: 0
HALLUCINATIONS: 0
ORTHOPNEA: 0
TREMORS: 0
WEIGHT LOSS: 0
HEMOPTYSIS: 0
BLURRED VISION: 0
CHILLS: 0
SHORTNESS OF BREATH: 0
COUGH: 0
SPEECH CHANGE: 0
PALPITATIONS: 0
FLANK PAIN: 0
DOUBLE VISION: 0
NAUSEA: 0
NECK PAIN: 0
BACK PAIN: 0
FOCAL WEAKNESS: 0
FEVER: 0
NERVOUS/ANXIOUS: 0

## 2019-07-31 ASSESSMENT — LIFESTYLE VARIABLES: SUBSTANCE_ABUSE: 0

## 2019-07-31 NOTE — CARE PLAN
Problem: Communication  Goal: The ability to communicate needs accurately and effectively will improve  Outcome: PROGRESSING SLOWER THAN EXPECTED  Pt mumbles her words and often switches from [place] to english. With prompting, pt is able to express needs in english.      Problem: Skin Integrity  Goal: Risk for impaired skin integrity will decrease  Outcome: PROGRESSING AS EXPECTED  Pt moves self frequently in bed due to restless leg syndrome.

## 2019-07-31 NOTE — THERAPY
"Occupational Therapy Treatment completed with focus on ADLs and ADL transfers.  Functional Status:    Pt seen for OT treatment this afternoon. Dons socks while in supine with supv, bed mobility supv, STS supv. Pt ambulated with FWW around room and down the peng. Improved activity tolerance, though did c/o pain at end of session as she was sitting onto bed. Min A for BLEs B2B. Improved management of FWW / better reception to education. Pt reports wanting to go to SNF prior to D/Cing home. Will continue to address independence with ADL and functional mobility.    Plan of Care: Will benefit from Occupational Therapy 3 times per week  Discharge Recommendations:  Equipment Will Continue to Assess for Equipment Needs. Post-acute therapy Recommend post-acute placement for additional occupational therapy services prior to discharge home. Patient can tolerate post-acute therapies at a 5x/week frequency.      See \"Rehab Therapy-Acute\" Patient Summary Report for complete documentation.   "

## 2019-07-31 NOTE — DISCHARGE PLANNING
Anticipated Discharge Disposition: TBD    Action: LSW informed by Florinda CANO that [facility] had further questions about pt.   LSW unable to get the following questions answered today.   Does patient still have TB?  -If patient is TB free, form must be completed by county stating patient is TB free   -Does patient need short term or long term care  -Does patient have any ports or tubes. If so, where?  -Does patient have any wounds past stage 2?    Barriers to Discharge: None    Plan: LSW to f/u with the above questions tomorrow.

## 2019-07-31 NOTE — THERAPY
"Speech Language Therapy speech treatment and cognitive-linguistic treatment completed.   Functional Status:  Patient with noted improvement in behavior and attention to task. She was able to correctly name all single item pictures and describe a picture with just x1 word finding difficulty. She is able to express her thoughts in complete sentences and hold a conversation appropriately. She was able to write multiple sentences without grammatic errors. She was instructed to write it in Khmer but she chose English and still no errors were found. She knows she has to call the nurse and use the call light to get out of bed because \"my walk is not good.\" Her attention, memory, problem solving/reasoning have improved but still impaired to the mild to moderate level. Per nursing report she continues to fluctuate in behavior and cognition.   Recommendations: She still needs 24/7 supervision/assistance at place of discharge  Plan of Care: Will benefit from Speech Therapy 3 times per week  Post-Acute Therapy: Recommend inpatient transitional care services for continued speech therapy services.        See \"Rehab Therapy-Acute\" Patient Summary Report for complete documentation.     "

## 2019-07-31 NOTE — THERAPY
Pt declined PT this afternoon, has been up amb [facility] w/nrsg. Pt willing to participate tomorrow a.m. PT will assess home w/brother vs SNF.

## 2019-07-31 NOTE — DISCHARGE PLANNING
Agency/Facility Name: [facility]  Spoke To: Ivette  Outcome: Per Ivette, patient must be TB free and form must be completed by county stating patient is TB free. [place](BRI[facility]) notified.

## 2019-07-31 NOTE — CARE PLAN
Problem: Mobility  Goal: Risk for activity intolerance will decrease  Outcome: PROGRESSING SLOWER THAN EXPECTED  Note:   Encouraging frequent ambulation. Pt often becomes tearful d/t complaints of pain but tolerates well despite generalized weakness. Pt ambulating with staff and FWW. Will sit up in chair for meals. Hourly rounding in place.       Problem: Pain Management  Goal: Pain level will decrease to patient's comfort goal  Outcome: PROGRESSING SLOWER THAN EXPECTED  Note:   Pt complaining of bilateral hip pain. Medicated per MAR. Nonpharmacologic techniques in place. Will continue to monitor and reassess q2hr.

## 2019-07-31 NOTE — PROGRESS NOTES
[facility] Medicine Daily Progress Note    Date of Service  7/31/2019  Chief Complaint  68 y.o. female admitted 5/29/2019 with right iliac and gluteus abscess    Hospital Course  Patient is a 68 year old with history of breast cancer, copd, dm, gerd, dl and htn who was admitted with right iliac gluteus abscess.  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    Interval Problem Update  Alert oriented x3.  Complaining of some left buttock pain worsening w  movements.  Afebrile, no overnight events.    Consultants/Specialty  ID  Neurosurgery  Ortho    Code Status  Full    Disposition  snf pending, would like to go to snf near family in [place]    Review of Systems  Review of Systems   Constitutional: Negative for chills, fever and weight loss.   HENT: Negative for ear pain, hearing loss and tinnitus.    Eyes: Negative for blurred vision, double vision and photophobia.   Respiratory: Negative for cough, hemoptysis and sputum production.    Cardiovascular: Negative for chest pain, palpitations and orthopnea.   Gastrointestinal: Negative for heartburn, nausea and vomiting.   Genitourinary: Negative for dysuria, flank pain, frequency and hematuria.   Musculoskeletal: Positive for joint pain. Negative for back pain and neck pain.   Skin: Negative for itching and rash.   Neurological: Negative for tremors, speech change, focal weakness and headaches.   Endo/Heme/Allergies: Negative for environmental allergies and polydipsia. Does not bruise/bleed easily.   Psychiatric/Behavioral: Negative for hallucinations and substance abuse. The patient is not nervous/anxious.         Physical Exam  Temp:  [36.2 °C (97.2 °F)-36.4 °C (97.5 °F)] 36.4 °C (97.5 °F)  Pulse:  [70-90] 70  Resp:  [16-18] 16  BP: (117-133)/(54-71) 133/71  SpO2:  [97 %-98 %] 98 %    Physical Exam   Constitutional: She is oriented to person, place, and  time. She appears well-developed and well-nourished.   HENT:   Head: Normocephalic and atraumatic.   Eyes: Pupils are equal, round, and reactive to light. EOM are normal.   Neck: Normal range of motion. Neck supple.   Cardiovascular: Normal rate, regular rhythm and normal heart sounds.   Pulmonary/Chest: Effort normal and breath sounds normal.   Abdominal: Soft. Bowel sounds are normal.   Musculoskeletal: Normal range of motion.   Neurological: She is alert and oriented to person, place, and time.   Skin: Skin is warm and dry.   Psychiatric: She has a normal mood and affect. Cognition and memory are impaired. She exhibits abnormal recent memory.   Nursing note and vitals reviewed.      Fluids    Intake/Output Summary (Last 24 hours) at 7/31/2019 1636  Last data filed at 7/30/2019 2000  Gross per 24 hour   Intake 50 ml   Output --   Net 50 ml       Laboratory  Recent Labs     07/29/19  0341 07/30/19  0322 07/31/19  0350   WBC 4.2* 3.3* 3.0*   RBC 3.77* 4.06* 3.93*   HEMOGLOBIN 11.0* 11.9* 11.9*   HEMATOCRIT 36.0* 37.8 36.9*   MCV 95.5 93.1 93.9   MCH 29.2 29.3 30.3   MCHC 30.6* 31.5* 32.2*   RDW 53.6* 52.1* 52.1*   PLATELETCT 230 222 210   MPV 8.9* 9.2 9.5     Recent Labs     07/29/19  0341 07/30/19  0322   SODIUM 136 140   POTASSIUM 3.1* 3.8   CHLORIDE 103 104   CO2 25 26   GLUCOSE 142* 101*   BUN 21 17   CREATININE 0.67 0.69   CALCIUM 9.8 10.2                   Imaging  IR-INJ-EXT PSEUDOANEURYSM PERC   Final Result      1.  Ultrasound guided thrombin injection for treatment of a RIGHT gluteal 3.6 cm pseudoaneurysm.      US-EXTREMITY ARTERY LOWER UNILAT RIGHT   Final Result      Right gluteal pseudoaneurysm measuring 3.65 x 2.24 x 2.31 cm.      CTA ABDOMEN PELVIS W & W/O POST PROCESS   Final Result      1.  Interval enlargement of a hyperdense ovoid masslike structure immediately posterior to the right ilium, possibly representing a pseudoaneurysm.   2.  Stable thin-walled apparent fluid-filled structure immediately  deep to the right ilium.   3.   Minimal retroperitoneal adenopathy, grossly stable.   4.  Cholelithiasis.      MR-BRAIN-WITH & W/O   Final Result      1.  Innumerable nodular enhancing lesions throughout the brain parenchyma both the supra and infratentorial compartments predominantly near the gray-white junction. There has been interval increase in size of several of these lesions since previous exam.    These changes may be secondary to metastatic disease or granulomatous disease.      2.  Interval right parietal craniotomy with underlying elliptical postsurgical defect.      3.  Interval increase in size of index right thalamic lesion which has increased vasogenic edema since previous exam.      4.  Age-related cerebral atrophy.      IR-PICC LINE PLACEMENT W/ GUIDANCE > AGE 5   Final Result                  Ultrasound-guided PICC placement performed by qualified nursing staff as    above.          [id number] VIEW   Final Result         1.  Moderate stool in the colon suggests changes of constipation, otherwise nonspecific bowel gas pattern   2.  Atherosclerosis      CT-NEEDLE BX-ABD-RETROPERITONL   Final Result      1.  CT GUIDED biopsy of a right pelvic phlegmon.      2. Significant interval enlargement of a right gluteal pseudoaneurysm. CTA and consideration for possible intervention either with direct thrombin injection or endovascular treatment was suggested. This was conveyed to Dr. Alatorre on 7/11/2019 via [facility]    text at 1750 hours.      US-EXTREMITY VENOUS LOWER BILAT   Final Result      IR-US GUIDED PIV   Final Result    Ultrasound-guided PERIPHERAL IV INSERTION performed by    qualified nursing staff as above.            MR-LUMBAR SPINE-WITH & W/O   Final Result         1.  Grade 2-3 spondylolisthesis at the L5-S1 level with bilateral spondylolysis of the pars interarticularis. This causes severe bilateral neural foraminal stenosis at this level.      2.  Interval removal of posterior fusion hardware  at the lumbosacral junction.      3.  Diffuse osteomyelitis involving the L5 vertebral body and the first 3 sacral segments.      4.  Chronic discitis at the L5-S1 level with anterior paraspinous abscess at this level and anterior to the S1 sacral segment.      5.  Smaller intraosseous bone bone abscesses or areas of necrosis noted in the sacrum.      6.  There is also evidence of osteomyelitis involving the jose antonio and sacrum bilaterally along the course of the previous sacral fixation screw. There is a large 4 cm abscess noted in the right gluteal soft tissues in a smaller 3 cm chronic appearing    abscess in the left gluteal musculature.      7.  There is a 1.5 cm intraosseous abscess or area of necrosis in the right posterior ilium.      8.  There is a 3.3 cm centimeters multiloculated abscess anterior to the right sacroiliac joint.      MR-STEALTH BRAIN WITH & W/O   Final Result         1.  Innumerable subcentimeter brain metastases, many at the gray-white junction but also multiple noted throughout the basal ganglia and brainstem.      2.  Largest index lesion is noted in the right thalamus and has increased in size since previous exam and currently measures 9 mm and has a moderate amount of surrounding vasogenic edema.      US-EXTREMITY NON VASCULAR UNILATERAL RIGHT   Final Result      No right hip joint effusion. No soft tissue fluid collection.      MR-BRAIN-WITH & W/O   Final Result      1.  Innumerable supra and infratentorial enhancing nodules are again noted throughout the brain parenchyma with multiple lesions appearing somewhat larger and with increased enhancement. No associated diffusion restriction. Unchanged differential    considerations including bacterial or fungal infection versus metastatic disease.   2.  Mild diffuse cerebral substance loss.   3.  Mild microangiopathic ischemic change versus demyelination or gliosis.      CT-NEEDLE BX-DEEP BONE   Final Result      CT GUIDED RIGHT ILIUM DEEP  BONE BIOPSY. SAMPLES WERE SENT TO MICRO[facility]OLOGY FOR ANALYSIS.      CT-PELVIS WITH   Final Result         1.  No significant interval change in the size of the RIGHT iliac muscle fluid collections   2.  Hyperdense RIGHT gluteal lesion moderately suspicious for pseudoaneurysm with adjacent hematoma.   3.  Changes in the RIGHT iliac bone, BILATERAL sacrum and LEFT iliac bone suspicious for osteomyelitis   4.  Changes at L5-S1 suspicious for discitis osteomyelitis      CT-PELVIS WITH   Final Result      1.  Residual or recurrent abscess within the right iliacus muscle measuring 2.5 x 1.8 cm. It slightly smaller than on 5/28/2019      2.  Interval removal of hardware from the iliac bones and sacrum      3.  Lytic change of the right iliac bone and the sacrum. This could be related to hardware versus osteomyelitis.      4.  Subacute fractures of the right ilium, sacrum, and there is lucency within the left iliac bone that is likely from hardware      MR-BRAIN-WITH & W/O   Final Result      1.  Interval progression of supra and infratentorial intra-axial nodules and associated edema. This short-term interval progression favors infection over neoplasm though both remain considerations.   2.  Mild atrophy      DX-PORTABLE FLUOROSCOPY < 1 HOUR   Final Result         Portable fluoroscopy utilized for 2 minutes.      DX-PELVIS-1 OR 2 VIEWS   Final Result      Status post hardware removal from the right SI joint      DX-CHEST-PORTABLE (1 VIEW)   Final Result      Interstitial prominence could be due to pulmonary edema or hypoventilatory change.      DX-CHEST-LIMITED (1 VIEW)   Final Result      LEFT basilar underinflation atelectasis or pneumonia      CT-DRAIN-HEMATOMA - SEROMA   Final Result      CT-guided RIGHT pelvic fluid collection aspiration, laboratory evaluation pending              Assessment/Plan  * Brain lesion- (present on admission)  Assessment & Plan  Right-sided craniotomy for resection of superficial mass   6/28/2019  Continue Isoniazid, Rifampin, Ethambutol, Pyrazinamide per ID, discussed with ID they have reached out to [facility] to discuss outpatient regimen  Continue Keppra for seizure prophylaxis, per ID consider restarting steroids if she has breakthrough seizures  No seizures observed    Hypokalemia- (present on admission)  Assessment & Plan  Replacing  following    Osteomyelitis (HCC)- (present on admission)  Assessment & Plan  Not osteo- actually disseminated TB  ID following    Granulomatous disease - (present on admission)  Assessment & Plan  Isoniazid, Rifampin, Ethambutol, Pyrazinamide    Pseudoaneurysm following procedure (HCC)- (present on admission)  Assessment & Plan    POD #5 s/p IR thrombin injection for thrombosed psedudoanerysm    Non-severe protein-calorie malnutrition (HCC)- (present on admission)  Assessment & Plan  Nutrition consult      Sepsis (HCC)- (present on admission)  Assessment & Plan  This is sepsis (without associated acute organ dysfunction).    Source - Right Iliac and Gluteus medius abscess  Resolved    Hypomagnesemia- (present on admission)  Assessment & Plan  Oral magnesium    Abscess of right iliac muscle and right gluteus medius muscle- (present on admission)  Assessment & Plan  CT guided pelvic aspiration 5/30/2019  Hardware removal, pelvis 6/5/2019  CT guided deep bone biopsy 6/19/2019  CT guided aspiration/biopsy of a R gluteal fluid collection/phlegmon 7/11/2019 - consistent with TB  Continue Isoniazid, Rifampin, Ethambutol, Pyrazinamide  Associated pain improving  Levaquin to continue for 9-12 months -- recommended by [facility] and ID      Dysphagia- (present on admission)  Assessment & Plan  Dysphagia 3  Speech following    History of DVT (deep vein thrombosis)- (present on admission)  Assessment & Plan  Xarelto was held due to hip hematoma and rifampin  Consider starting coumadin in 1-2 weeks if h/h remains stable    Essential hypertension- (present on  admission)  Assessment & Plan  Continue Metoprolol, Losartan and Amlodipine as tolerated  Blood pressure stable    RLS (restless legs syndrome)- (present on admission)  Assessment & Plan  Pramipexole    Chronic pain- (present on admission)  Assessment & Plan    Opiates were causing confusion   Has now been tapered off long acting opiates and doing well  Continue tapering off as tolerated      History of breast cancer- (present on admission)  Assessment & Plan  history left mastectomy and breast implant.      COPD (chronic obstructive pulmonary disease) (HCC)- (present on admission)  Assessment & Plan  Continue RT protocol  No s/o exacerbation       VTE prophylaxis: Heparin

## 2019-07-31 NOTE — PROGRESS NOTES
Pt AAOx3, disoriented to time.   Ambulating with staff and FWW.   Complaining of pain, medicated per MAR.   Heating packs in place.   No acute changes, VSS.   Hourly rounding in place, will continue to monitor.

## 2019-07-31 NOTE — PROGRESS NOTES
Infectious Disease Progress Note    Author: Karen Garcia M.D. Date & Time of service: 7/31/2019  1:54 PM    Chief Complaint:  Brain abscess, gluteal abscess  Vertebral OM     Interval History:  68 y.o. female admitted 5/29/2019 as a transfer from [facility] since 4/30/2019. + diabetes, SANTOSH of right hip with hardware, and breast cancer who was originally admitted to [facility] on 03/08/2019 for right hip and pelvic pain.  Work-up revealed a right iliopsoas lesion, gluteal abscess, and mult brain abscesses     7/4 AF much more alert and conversant today-c/o left hip pain, unrelenting and would like parietal dressing changed. HA at surgical site.  Denies SE abx. No new neurodeficits  7/5 afebrile WBC 5.4.  Patient sleeping but arousable.  She denies any headache, nausea, vomiting.  7/6 afebrile WBC 6.5.  Patient sitting up in chair and having excruciating back pain  7/8 Pt has no specific complaints, she is mildly confused today.  She seems to be tolerating her medications with no significant lab abnormalities.  7/9 AF, O2 RA, Significant left hip pain today.  She does appear to be tolerating her antibiotics.  Ultrasound of bilateral lower extremities has been ordered.  7/10 AF, O2 RA,  Plan for IR drainage of sacral collection soon.   7/11 AF, O2 RA,  Pt continued on RIPE and ampho.  She is tolerating well overall, requiring some mag and potassium replacement.  She is complaining of severe pain in left hip again today.  Plan for biopsy later today.   7/12  IR biopsy of R gluteal abscess/hip. AF, O2 2 L NC,   tolerating antibiotics so far.  She is quite somnolent today but per nursing she was oriented x4 earlier  7/14 AF, O2 RA, more alert today, conversant and asking for advance in diet. Left hip pain ongoing but improved.   7/15 afebrile WBC 4.4.  Patient's speech is somewhat muffled but she is alert and responding to questions appropriately.  MRI shows worsening lesions and amphotericin stopped.  Nurse reports  waxing and waning mental status  7/16 afebrile.  Patient sleeping but arousable.  She is answer questions appropriately but then forgets that she is in the hospital.  7/17 afebrile WBC 4.9 patient is very drowsy as she states she did not sleep well yesterday.  She is asking about whether or not she has a fungal infection.  Plan of care discussed with patient.  7/19 afebrile patient continues to only endorse left hip pain exacerbated by sitting up in the chair.  She sat in the chair for 30 minutes yesterday.  Getting out of bed is limited secondary to left hip pain.  Mental status remains about the same with intermittent waxing and waning.  No new issues today per RN  7/21 afebrile patient is much more awake and alert today.  She is answering questions appropriately.  Yesterday she was complaining of some abdominal pain so CT scan was done and revealed interval enlargement of a hyperdense ovoid masslike structure posterior to the right ilium concerning for pseudoaneurysm.  7/22/2019 no fevers.  No new issues overnight.  Had her ultrasound today follow the results  7/23/2019-no fevers.  Ongoing pain.  The AFB cultures have come back positive for TB  7/24/2019 no fevers.  No new issues overnight.  Very anxious and agitated  7/25/2019 no fevers.  No new issues.  Says she feels slightly better.  7/26 AF WBC 4.5 tolerating meds well-wants cream to decrease size of surgical scar-has right-sided rib pain and joint pain  7/27 AF ambulating with assist with walker-no new complaints  7/28 AF same pain complaint-remains debilitated with intermittent confusion  7/29 AF WBC 4.2 no clinical change  7/30 AF WBC 3.3 sleeping sounding-dw RN has been complaining of hip pain but able to ambulate  7/31 AF WBC 3 ambulating in peng with rolling walker-NAD. No new complaints    Review of Systems:  Review of Systems   Constitutional: Negative for chills and fever.   Respiratory: Negative for cough and shortness of breath.    Cardiovascular:  Negative for chest pain.   Gastrointestinal: Negative for nausea.   Musculoskeletal: Positive for joint pain.   Neurological: Negative for dizziness.   All other systems reviewed and are negative.      Hemodynamics:  Temp (24hrs), Av.3 °C (97.4 °F), Min:36.2 °C (97.2 °F), Max:36.4 °C (97.6 °F)  Temperature: 36.4 °C (97.5 °F)  Pulse  Av.9  Min: 49  Max: 195   Blood Pressure : 133/71       Physical Exam:  Physical Exam   Constitutional: She is oriented to person, place, and time. No distress.   Elderly  Chronically ill-appearing   HENT:   Mouth/Throat: Oropharynx is clear and moist. No oropharyngeal exudate.   Right parietal surgical site healed   Eyes: Right eye exhibits no discharge. Left eye exhibits no discharge.   Neck: Neck supple. No JVD present.   Cardiovascular: Normal rate and regular rhythm.   Pulmonary/Chest: Effort normal. No stridor. No respiratory distress.   Abdominal: Soft. She exhibits no distension. There is no tenderness.   Musculoskeletal: She exhibits no edema.   Neurological: She is alert and oriented to person, place, and time. Coordination normal.   Skin: Skin is warm. No rash noted. She is not diaphoretic. No erythema.   Nursing note and vitals reviewed.      Meds:    Current Facility-Administered Medications:   •  magnesium chloride  •  levoFLOXacin  •  acetaminophen  •  hyoscyamine-maalox plus-lidocaine viscous  •  riFAMPin  •  thrombin  •  oxyCODONE immediate-release  •  temazepam  •  potassium chloride SA  •  losartan  •  pramipexole  •  senna-docusate  •  gabapentin  •  ferrous sulfate  •  diphenhydrAMINE  •  heparin  •  pyridoxine  •  levETIRAcetam  •  isoniazid  •  ethambutol  •  pyrazinamide  •  Pharmacy Consult Request  •  labetalol  •  lactobacillus rhamnosus  •  lidocaine  •  cyclobenzaprine  •  Respiratory Care per Protocol  •  amLODIPine  •  cinacalcet  •  atorvastatin  •  omeprazole  •  metoprolol  •  ondansetron  •  ondansetron    Labs:  Recent Labs     19  1103  07/30/19  0322 07/31/19  0350   WBC 4.2* 3.3* 3.0*   RBC 3.77* 4.06* 3.93*   HEMOGLOBIN 11.0* 11.9* 11.9*   HEMATOCRIT 36.0* 37.8 36.9*   MCV 95.5 93.1 93.9   MCH 29.2 29.3 30.3   RDW 53.6* 52.1* 52.1*   PLATELETCT 230 222 210   MPV 8.9* 9.2 9.5   NEUTSPOLYS 53.50 47.20 43.90*   LYMPHOCYTES 26.70 32.10 34.50   MONOCYTES 12.10 12.80 11.80   EOSINOPHILS 6.60 6.70 7.80*   BASOPHILS 0.90 0.90 1.00     Recent Labs     07/29/19  0341 07/30/19  0322   SODIUM 136 140   POTASSIUM 3.1* 3.8   CHLORIDE 103 104   CO2 25 26   GLUCOSE 142* 101*   BUN 21 17     Recent Labs     07/29/19 0341 07/30/19  0322   CREATININE 0.67 0.69       Imaging:  MRI on 7/14/2019  Impression:       1.  Innumerable nodular enhancing lesions throughout the brain parenchyma both the supra and infratentorial compartments predominantly near the gray-white junction. There has been interval increase in size of several of these lesions since previous exam.   These changes may be secondary to metastatic disease or granulomatous disease.    2.  Interval right parietal craniotomy with underlying elliptical postsurgical defect.    3.  Interval increase in size of index right thalamic lesion which has increased vasogenic edema since previous exam.    4.  Age-related cerebral atrophy.       Micro:  Results     Procedure Component Value Units Date/Time    Anaerobic Culture [[id number]] Collected:  07/11/19 1745    Order Status:  Completed Specimen:  Tissue Updated:  07/26/19 1017     Significant Indicator NEG     Source TISS     Site Right Hip Cores     Culture Result No Anaerobes isolated.    Narrative:       Radiology specimen Hold specimen 14 days per Dr. Junior    CULTURE TISSUE W/ GRM STAIN [[id number]] Collected:  07/11/19 1745    Order Status:  Completed Specimen:  Tissue Updated:  07/26/19 1017     Significant Indicator NEG     Source TISS     Site Right Hip Cores     Culture Result No growth at 14 days.     Gram Stain Result No organisms seen.     "Narrative:       Radiology specimen Hold specimen 14 days per Dr. Junior          Assessment:  Active Hospital Problems    Diagnosis   • *Brain lesion [G93.9]   • Granulomatous disease  [L92.9]   • Abscess of right iliac muscle and right gluteus medius muscle [L02.91]   • Pseudoaneurysm following procedure (HCC) [I97.89, I72.9]   • Sepsis (HCC) [A41.9]   • History of breast cancer [Z85.3]       Assessment/Plan:   Brain lesions  Tubercular  Encephalopathy, intermittent waxing and waning  MRI 4/23 \"supra and infratentorial brain parenchyma. Decrease in the size of the lesions. Interval reduction in the extent of the surrounding white matter edema consistent with improvement/treatment response of the most of the multifocal brain abscesses.  MRI 5/21 showed innumerable microabscesses vs mets  MRI on 6/8 with interval progression of supra and infratentorial intra--axial nodules and associated edema.  New right thalamus lesion. Radiology favors infection over neoplasm though both remain considerations (had been off abx)  Mult blood cultures neg  CHAS neg 3/15 and 5/24  MRI 6/22 worsening CNS lesions  S/p right craniotomy and resection of mass with biopsy on 6/28. Biopsy-per note fungal appearing elements seen per Neurosurgery-path   +necrotizing granulomas. All stains negative in EPIC  Fungal culture- negative to date  Bacterial cultures-negative to date  Brucella ab - negative  Coxiella ab - negative   Histo ab serum & antigen urine-negative  Blasto ab - negative   Repeat MRI on 7/14 + increase in in nodular enhancing lesions throughout the brain parenchyma  Continue RIPE+B6 for 2 months (through 9/2/2019) followed by Rifamate with B6 for an additional 7-12 months  Follow-up brain biopsy specimen sent to [facility] for PCR testing, bacterial, fungal, AFB   Repeat MRI mid August     Gluteal and iliopsoas abscess On treatment  OM L5, S1-3, skeletal TB  Recurrent abscesses  Adjacent to hardware in right hip " "(placed 12/17/18)  CT 4/23 \"Fluid collections within the right gluteal and iliacis muscles.. The collection within the right gluteal muscle has unchanged while the fluid collection within the right iliacis muscle is increased somewhat in size.\" 2.7 cm  Cultures 3/29 and 4/4 neg  MRI on 5/20/2019 - interval decrease in collection in R gluteal muscle and persistent multiloculated collection in R iliacus muscle.   CT 5/28 no change  s/p drainage on 5/30/2019-cultures negative  S/p removal hardware 6/5-no cultures done  CT on 6/8 with residual abscess in the right iliacus muscle, 2.5 x 1.8 cm, slightly smaller than prior  s/p CT-guided deep bone biopsy 6/19/2019.  Cx +Mtb  MRI 6/28 chronic discitis, diffuse OM L5, 3 and 4 cm abscesses right glute, 1.5 cm abscess or necrosis right posterior ileum, 3.3 cm abscess right SI joint  Continue TB therapy as above with levofloxacin per [facility]     +QuantiGold  Path with necrotizing granulomas  AFB stain neg  AFB cultures + 6/19  Started RIPE +B6 7/3  Monitor for visual changes while on ethambutol.    Ophthalmology evaluation for baseline vision as well as color vision testing when able     Type 2 DM, chronic  Hemoglobin A1c 6.3   Keep BS under 150 to help control current infection      Will follow up at [facility] for meds and case management after discharge    I have performed a physical exam and reviewed and updated ROS as of today.  In review of yesterday's note dated 7/30/2019 , there are no changes except as documented above.        "

## 2019-08-01 LAB — MAGNESIUM SERPL-MCNC: 1.8 MG/DL (ref 1.5–2.5)

## 2019-08-01 PROCEDURE — 700102 HCHG RX REV CODE 250 W/ 637 OVERRIDE(OP): Performed by: INTERNAL MEDICINE

## 2019-08-01 PROCEDURE — A9270 NON-COVERED ITEM OR SERVICE: HCPCS | Performed by: INTERNAL MEDICINE

## 2019-08-01 PROCEDURE — A9270 NON-COVERED ITEM OR SERVICE: HCPCS | Performed by: FAMILY MEDICINE

## 2019-08-01 PROCEDURE — 700102 HCHG RX REV CODE 250 W/ 637 OVERRIDE(OP): Performed by: FAMILY MEDICINE

## 2019-08-01 PROCEDURE — 97116 GAIT TRAINING THERAPY: CPT | Mod: XU

## 2019-08-01 PROCEDURE — 97530 THERAPEUTIC ACTIVITIES: CPT

## 2019-08-01 PROCEDURE — 99232 SBSQ HOSP IP/OBS MODERATE 35: CPT | Performed by: INTERNAL MEDICINE

## 2019-08-01 PROCEDURE — 700101 HCHG RX REV CODE 250: Performed by: FAMILY MEDICINE

## 2019-08-01 PROCEDURE — 700111 HCHG RX REV CODE 636 W/ 250 OVERRIDE (IP): Performed by: INTERNAL MEDICINE

## 2019-08-01 PROCEDURE — 770001 HCHG ROOM/CARE - MED/SURG/GYN PRIV*

## 2019-08-01 PROCEDURE — 36415 COLL VENOUS BLD VENIPUNCTURE: CPT

## 2019-08-01 PROCEDURE — 83735 ASSAY OF MAGNESIUM: CPT

## 2019-08-01 RX ADMIN — OXYCODONE HYDROCHLORIDE 5 MG: 5 TABLET ORAL at 04:26

## 2019-08-01 RX ADMIN — PRAMIPEXOLE DIHYDROCHLORIDE 0.25 MG: 0.5 TABLET ORAL at 07:55

## 2019-08-01 RX ADMIN — PYRAZINAMIDE 1000 MG: 500 TABLET ORAL at 04:27

## 2019-08-01 RX ADMIN — SENNOSIDES, DOCUSATE SODIUM 2 TABLET: 50; 8.6 TABLET, FILM COATED ORAL at 04:27

## 2019-08-01 RX ADMIN — ETHAMBUTOL HYDROCHLORIDE 800 MG: 400 TABLET, FILM COATED ORAL at 04:26

## 2019-08-01 RX ADMIN — PYRIDOXINE HCL TAB 50 MG 100 MG: 50 TAB at 04:27

## 2019-08-01 RX ADMIN — PRAMIPEXOLE DIHYDROCHLORIDE 0.25 MG: 0.5 TABLET ORAL at 20:41

## 2019-08-01 RX ADMIN — LOSARTAN POTASSIUM 75 MG: 50 TABLET ORAL at 04:27

## 2019-08-01 RX ADMIN — OMEPRAZOLE 20 MG: 20 CAPSULE, DELAYED RELEASE ORAL at 04:27

## 2019-08-01 RX ADMIN — TEMAZEPAM 15 MG: 15 CAPSULE ORAL at 20:41

## 2019-08-01 RX ADMIN — MAGNESIUM 64 MG (MAGNESIUM CHLORIDE) TABLET,DELAYED RELEASE 64 MG: at 04:27

## 2019-08-01 RX ADMIN — HEPARIN SODIUM 5000 UNITS: 5000 INJECTION, SOLUTION INTRAVENOUS; SUBCUTANEOUS at 04:26

## 2019-08-01 RX ADMIN — ACETAMINOPHEN 500 MG: 500 TABLET ORAL at 15:21

## 2019-08-01 RX ADMIN — GABAPENTIN 400 MG: 400 CAPSULE ORAL at 17:38

## 2019-08-01 RX ADMIN — CYCLOBENZAPRINE 10 MG: 10 TABLET, FILM COATED ORAL at 07:55

## 2019-08-01 RX ADMIN — CYCLOBENZAPRINE 10 MG: 10 TABLET, FILM COATED ORAL at 13:44

## 2019-08-01 RX ADMIN — METOPROLOL TARTRATE 25 MG: 25 TABLET, FILM COATED ORAL at 04:27

## 2019-08-01 RX ADMIN — POTASSIUM CHLORIDE 20 MEQ: 20 TABLET, EXTENDED RELEASE ORAL at 04:27

## 2019-08-01 RX ADMIN — GABAPENTIN 400 MG: 400 CAPSULE ORAL at 04:27

## 2019-08-01 RX ADMIN — LEVETIRACETAM 500 MG: 500 TABLET, FILM COATED ORAL at 04:27

## 2019-08-01 RX ADMIN — HEPARIN SODIUM 5000 UNITS: 5000 INJECTION, SOLUTION INTRAVENOUS; SUBCUTANEOUS at 17:38

## 2019-08-01 RX ADMIN — FERROUS SULFATE TAB 325 MG (65 MG ELEMENTAL FE) 325 MG: 325 (65 FE) TAB at 07:55

## 2019-08-01 RX ADMIN — ATORVASTATIN CALCIUM 10 MG: 10 TABLET, FILM COATED ORAL at 17:38

## 2019-08-01 RX ADMIN — LIDOCAINE 2 PATCH: 50 PATCH CUTANEOUS at 10:44

## 2019-08-01 RX ADMIN — CINACALCET HYDROCHLORIDE 60 MG: 30 TABLET, COATED ORAL at 04:27

## 2019-08-01 RX ADMIN — LEVETIRACETAM 500 MG: 500 TABLET, FILM COATED ORAL at 17:38

## 2019-08-01 RX ADMIN — ISONIAZID 300 MG: 300 TABLET ORAL at 04:27

## 2019-08-01 RX ADMIN — PRAMIPEXOLE DIHYDROCHLORIDE 0.25 MG: 0.5 TABLET ORAL at 13:44

## 2019-08-01 RX ADMIN — OXYCODONE HYDROCHLORIDE 5 MG: 5 TABLET ORAL at 10:44

## 2019-08-01 RX ADMIN — MAGNESIUM 64 MG (MAGNESIUM CHLORIDE) TABLET,DELAYED RELEASE 64 MG: at 17:38

## 2019-08-01 RX ADMIN — Medication 1 CAPSULE: at 07:55

## 2019-08-01 RX ADMIN — AMLODIPINE BESYLATE 10 MG: 5 TABLET ORAL at 04:27

## 2019-08-01 RX ADMIN — LEVOFLOXACIN 750 MG: 750 TABLET, FILM COATED ORAL at 04:27

## 2019-08-01 RX ADMIN — RIFAMPIN 600 MG: 300 CAPSULE ORAL at 04:27

## 2019-08-01 RX ADMIN — GABAPENTIN 400 MG: 400 CAPSULE ORAL at 12:37

## 2019-08-01 ASSESSMENT — ENCOUNTER SYMPTOMS
SPEECH CHANGE: 0
FOCAL WEAKNESS: 0
ORTHOPNEA: 0
FEVER: 0
HEADACHES: 0
NECK PAIN: 0
SPUTUM PRODUCTION: 0
PHOTOPHOBIA: 0
HEARTBURN: 0
WEIGHT LOSS: 0
FLANK PAIN: 0
DIZZINESS: 0
NERVOUS/ANXIOUS: 0
POLYDIPSIA: 0
BACK PAIN: 0
SHORTNESS OF BREATH: 0
VOMITING: 0
NAUSEA: 0
DOUBLE VISION: 0
TREMORS: 0
COUGH: 0
HEMOPTYSIS: 0
HALLUCINATIONS: 0
PALPITATIONS: 0
BLURRED VISION: 0
BRUISES/BLEEDS EASILY: 0
CHILLS: 0

## 2019-08-01 ASSESSMENT — GAIT ASSESSMENTS
DISTANCE (FEET): 80
ASSISTIVE DEVICE: FRONT WHEEL WALKER
GAIT LEVEL OF ASSIST: SUPERVISED
DEVIATION: ANTALGIC

## 2019-08-01 ASSESSMENT — COGNITIVE AND FUNCTIONAL STATUS - GENERAL
CLIMB 3 TO 5 STEPS WITH RAILING: A LOT
TURNING FROM BACK TO SIDE WHILE IN FLAT BAD: A LITTLE
MOBILITY SCORE: 17
SUGGESTED CMS G CODE MODIFIER MOBILITY: CK
MOVING FROM LYING ON BACK TO SITTING ON SIDE OF FLAT BED: A LITTLE
MOVING TO AND FROM BED TO CHAIR: A LITTLE
STANDING UP FROM CHAIR USING ARMS: A LITTLE
WALKING IN HOSPITAL ROOM: A LITTLE

## 2019-08-01 ASSESSMENT — LIFESTYLE VARIABLES
TOTAL SCORE: 0
HAVE YOU EVER FELT YOU SHOULD CUT DOWN ON YOUR DRINKING: NO
TOTAL SCORE: 0
EVER FELT BAD OR GUILTY ABOUT YOUR DRINKING: NO
EVER HAD A DRINK FIRST THING IN THE MORNING TO STEADY YOUR NERVES TO GET RID OF A HANGOVER: NO
SUBSTANCE_ABUSE: 0
ALCOHOL_USE: NO
CONSUMPTION TOTAL: NEGATIVE
ON A TYPICAL DAY WHEN YOU DRINK ALCOHOL HOW MANY DRINKS DO YOU HAVE: 0
HOW MANY TIMES IN THE PAST YEAR HAVE YOU HAD 5 OR MORE DRINKS IN A DAY: 0
AVERAGE NUMBER OF DAYS PER WEEK YOU HAVE A DRINK CONTAINING ALCOHOL: 0
HAVE PEOPLE ANNOYED YOU BY CRITICIZING YOUR DRINKING: NO
TOTAL SCORE: 0
EVER_SMOKED: YES

## 2019-08-01 NOTE — CARE PLAN
Pt ambulating with 1 person assistance up to the bathroom, no incontinence this shift. No complaints of pain this shift.

## 2019-08-01 NOTE — THERAPY
"Physical Therapy Treatment completed.   Bed Mobility:  Supine to Sit: Minimal Assist  Transfers: Sit to Stand: Supervised  Gait: Level Of Assist: Supervised with Front-Wheel Walker       Plan of Care: Will benefit from Physical Therapy 3 times per week  Discharge Recommendations: Equipment: Will Continue to Assess for Equipment Needs.  Pts tx efforts this a.m limited by pain. Pt is not getting her proper pain med regime @ night and being offered a bedpan vs getting up to the BR. If pt is going to be able to d/c home w/brother, pt needs improved pain control to allow for OOB mobility.   Pt has the potential of d/c'ing home w/brother if she becomes more mobile.   DO NOT OFFER BEDPAN or BSC. Pt needs to start consistently amb to the BR.       See \"Rehab Therapy-Acute\" Patient Summary Report for complete documentation.       "

## 2019-08-01 NOTE — PROGRESS NOTES
Infectious Disease Progress Note    Author: Karen Gacria M.D. Date & Time of service: 8/1/2019  1:01 PM    Chief Complaint:  Brain abscess, gluteal abscess  Vertebral OM     Interval History:  68 y.o. female admitted 5/29/2019 as a transfer from [facility] since 4/30/2019. + diabetes, SANTOSH of right hip with hardware, and breast cancer who was originally admitted to [facility] on 03/08/2019 for right hip and pelvic pain.  Work-up revealed a right iliopsoas lesion, gluteal abscess, and mult brain abscesses     7/4 AF much more alert and conversant today-c/o left hip pain, unrelenting and would like parietal dressing changed. HA at surgical site.  Denies SE abx. No new neurodeficits  7/5 afebrile WBC 5.4.  Patient sleeping but arousable.  She denies any headache, nausea, vomiting.  7/6 afebrile WBC 6.5.  Patient sitting up in chair and having excruciating back pain  7/8 Pt has no specific complaints, she is mildly confused today.  She seems to be tolerating her medications with no significant lab abnormalities.  7/9 AF, O2 RA, Significant left hip pain today.  She does appear to be tolerating her antibiotics.  Ultrasound of bilateral lower extremities has been ordered.  7/10 AF, O2 RA,  Plan for IR drainage of sacral collection soon.   7/11 AF, O2 RA,  Pt continued on RIPE and ampho.  She is tolerating well overall, requiring some mag and potassium replacement.  She is complaining of severe pain in left hip again today.  Plan for biopsy later today.   7/12  IR biopsy of R gluteal abscess/hip. AF, O2 2 L NC,   tolerating antibiotics so far.  She is quite somnolent today but per nursing she was oriented x4 earlier  7/14 AF, O2 RA, more alert today, conversant and asking for advance in diet. Left hip pain ongoing but improved.   7/15 afebrile WBC 4.4.  Patient's speech is somewhat muffled but she is alert and responding to questions appropriately.  MRI shows worsening lesions and amphotericin stopped.  Nurse reports  waxing and waning mental status  7/16 afebrile.  Patient sleeping but arousable.  She is answer questions appropriately but then forgets that she is in the hospital.  7/17 afebrile WBC 4.9 patient is very drowsy as she states she did not sleep well yesterday.  She is asking about whether or not she has a fungal infection.  Plan of care discussed with patient.  7/19 afebrile patient continues to only endorse left hip pain exacerbated by sitting up in the chair.  She sat in the chair for 30 minutes yesterday.  Getting out of bed is limited secondary to left hip pain.  Mental status remains about the same with intermittent waxing and waning.  No new issues today per RN  7/21 afebrile patient is much more awake and alert today.  She is answering questions appropriately.  Yesterday she was complaining of some abdominal pain so CT scan was done and revealed interval enlargement of a hyperdense ovoid masslike structure posterior to the right ilium concerning for pseudoaneurysm.  7/22/2019 no fevers.  No new issues overnight.  Had her ultrasound today follow the results  7/23/2019-no fevers.  Ongoing pain.  The AFB cultures have come back positive for TB  7/24/2019 no fevers.  No new issues overnight.  Very anxious and agitated  7/25/2019 no fevers.  No new issues.  Says she feels slightly better.  7/26 AF WBC 4.5 tolerating meds well-wants cream to decrease size of surgical scar-has right-sided rib pain and joint pain  7/27 AF ambulating with assist with walker-no new complaints  7/28 AF same pain complaint-remains debilitated with intermittent confusion  7/29 AF WBC 4.2 no clinical change  7/30 AF WBC 3.3 sleeping sounding-dw RN has been complaining of hip pain but able to ambulate  7/31 AF WBC 3 ambulating in peng with rolling walker-NAD. No new complaints  8/1 AF knitting in bed-looks comfortable but requesting more pain meds    Review of Systems:  Review of Systems   Constitutional: Negative for chills and fever.    Respiratory: Negative for cough and shortness of breath.    Cardiovascular: Negative for chest pain.   Gastrointestinal: Negative for nausea.   Musculoskeletal: Positive for joint pain.   Neurological: Negative for dizziness.   All other systems reviewed and are negative.      Hemodynamics:  Temp (24hrs), Av.4 °C (97.6 °F), Min:36.3 °C (97.3 °F), Max:36.5 °C (97.7 °F)  Temperature: 36.4 °C (97.6 °F)  Pulse  Av.8  Min: 49  Max: 195   Blood Pressure : 126/65       Physical Exam:  Physical Exam   Constitutional: She is oriented to person, place, and time. No distress.   Elderly  Chronically ill-appearing   HENT:   Mouth/Throat: Oropharynx is clear and moist. No oropharyngeal exudate.   Right parietal surgical site healed   Eyes: Right eye exhibits no discharge. Left eye exhibits no discharge.   Neck: Neck supple. No JVD present.   Cardiovascular: Normal rate and regular rhythm.   Pulmonary/Chest: Effort normal. No stridor. She has no wheezes. She has no rales.   Abdominal: Soft. There is no rebound and no guarding.   Musculoskeletal: She exhibits no edema.   Neurological: She is alert and oriented to person, place, and time. Coordination normal.   Skin: Skin is warm. No rash noted. She is not diaphoretic. No erythema.   Nursing note and vitals reviewed.      Meds:    Current Facility-Administered Medications:   •  magnesium chloride  •  levoFLOXacin  •  acetaminophen  •  hyoscyamine-maalox plus-lidocaine viscous  •  riFAMPin  •  thrombin  •  oxyCODONE immediate-release  •  temazepam  •  potassium chloride SA  •  losartan  •  pramipexole  •  senna-docusate  •  gabapentin  •  ferrous sulfate  •  diphenhydrAMINE  •  heparin  •  pyridoxine  •  levETIRAcetam  •  isoniazid  •  ethambutol  •  pyrazinamide  •  Pharmacy Consult Request  •  labetalol  •  lactobacillus rhamnosus  •  lidocaine  •  cyclobenzaprine  •  Respiratory Care per Protocol  •  amLODIPine  •  cinacalcet  •  atorvastatin  •  omeprazole  •   metoprolol  •  ondansetron  •  ondansetron    Labs:  Recent Labs     07/30/19  0322 07/31/19  0350   WBC 3.3* 3.0*   RBC 4.06* 3.93*   HEMOGLOBIN 11.9* 11.9*   HEMATOCRIT 37.8 36.9*   MCV 93.1 93.9   MCH 29.3 30.3   RDW 52.1* 52.1*   PLATELETCT 222 210   MPV 9.2 9.5   NEUTSPOLYS 47.20 43.90*   LYMPHOCYTES 32.10 34.50   MONOCYTES 12.80 11.80   EOSINOPHILS 6.70 7.80*   BASOPHILS 0.90 1.00     Recent Labs     07/30/19  0322   SODIUM 140   POTASSIUM 3.8   CHLORIDE 104   CO2 26   GLUCOSE 101*   BUN 17     Recent Labs     07/30/19  0322   CREATININE 0.69       Imaging:  MRI on 7/14/2019  Impression:       1.  Innumerable nodular enhancing lesions throughout the brain parenchyma both the supra and infratentorial compartments predominantly near the gray-white junction. There has been interval increase in size of several of these lesions since previous exam.   These changes may be secondary to metastatic disease or granulomatous disease.    2.  Interval right parietal craniotomy with underlying elliptical postsurgical defect.    3.  Interval increase in size of index right thalamic lesion which has increased vasogenic edema since previous exam.    4.  Age-related cerebral atrophy.       Micro:  Results     Procedure Component Value Units Date/Time    Anaerobic Culture [[id number]] Collected:  07/11/19 1745    Order Status:  Completed Specimen:  Tissue Updated:  07/26/19 1017     Significant Indicator NEG     Source TISS     Site Right Hip Cores     Culture Result No Anaerobes isolated.    Narrative:       Radiology specimen Hold specimen 14 days per Dr. Junior    CULTURE TISSUE W/ GRM STAIN [[id number]] Collected:  07/11/19 1745    Order Status:  Completed Specimen:  Tissue Updated:  07/26/19 1017     Significant Indicator NEG     Source TISS     Site Right Hip Cores     Culture Result No growth at 14 days.     Gram Stain Result No organisms seen.    Narrative:       Radiology specimen Hold specimen 14 days per Dr. Junior  "         Assessment:  Active Hospital Problems    Diagnosis   • *Brain lesion [G93.9]   • Granulomatous disease  [L92.9]   • Abscess of right iliac muscle and right gluteus medius muscle [L02.91]   • Pseudoaneurysm following procedure (HCC) [I97.89, I72.9]   • Sepsis (HCC) [A41.9]   • History of breast cancer [Z85.3]       Assessment/Plan:   Brain lesions  Tubercular  Encephalopathy, intermittent waxing and waning  MRI 4/23 \"supra and infratentorial brain parenchyma. Decrease in the size of the lesions. Interval reduction in the extent of the surrounding white matter edema consistent with improvement/treatment response of the most of the multifocal brain abscesses.  MRI on 6/8 with interval progression of supra and infratentorial intra--axial nodules and associated edema.  New right thalamus lesion. Radiology favors infection over neoplasm (had been off abx)  Mult blood cultures neg; CHAS neg 3/15 and 5/24  MRI 6/22 worsening CNS lesions  S/p right craniotomy and resection of mass with biopsy on 6/28. Biopsy-per note fungal appearing elements seen per Neurosurgery-path   +necrotizing granulomas. All stains negative in EPIC  Fungal culture- negative to date  Bacterial cultures-negative to date  Brucella ab - negative  Coxiella ab - negative   Histo ab serum & antigen urine-negative  Blasto ab - negative   Repeat MRI on 7/14 + increase in in nodular enhancing lesions throughout the brain parenchyma  Continue RIPE+B6 for 2 months (through 9/2/2019) followed by Rifamate with B6 for an additional 7-12 months  Follow-up brain biopsy specimen sent to [facility] for PCR testing, bacterial, fungal, AFB   Repeat MRI mid August     Gluteal and iliopsoas abscess, on treatment  OM L5, 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 The collection within the right gluteal muscle has unchanged while " "the fluid collection within the right iliacis muscle is increased somewhat in size.\" 2.7 cm  Cultures 3/29 and 4/4 neg  MRI on 5/20/2019 - interval decrease in collection in R gluteal muscle and persistent multiloculated collection in R iliacus muscle.   CT 5/28 no change  s/p drainage on 5/30/2019-cultures negative  S/p removal hardware 6/5-no cultures done  CT on 6/8 with residual abscess in the right iliacus muscle, 2.5 x 1.8 cm, slightly smaller than prior  s/p CT-guided deep bone biopsy 6/19/2019.  Cx +Mtb  MRI 6/28 chronic discitis, diffuse OM L5, 3 and 4 cm abscesses right glute, 1.5 cm abscess or necrosis right posterior ileum, 3.3 cm abscess right SI joint  Continue TB therapy as above with levofloxacin per [facility]     +QuantiGold  Path with necrotizing granulomas  AFB stain neg  AFB cultures + 6/19  Started RIPE +B6 7/3  Monitor for visual changes while on ethambutol.    Ophthalmology evaluation for baseline vision as well as color vision testing when able     Type 2 DM, chronic  Hemoglobin A1c 6.3   Keep BS under 150 to help control current infection      Will follow up at [facility] for meds and case management after discharge    I have performed a physical exam and reviewed and updated ROS as of today.  In review of yesterday's note dated 7/31/2019 , there are no changes except as documented above.        "

## 2019-08-01 NOTE — CARE PLAN
Problem: Urinary Elimination:  Goal: Ability to reestablish a normal urinary elimination pattern will improve  Outcome: PROGRESSING SLOWER THAN EXPECTED  Flowsheets (Taken 8/1/2019 1214)  Urinary Elimination: Urgency; Incontinence  Note:   Established a bladder training trial with pt d/t periods of incontinence and urgency.      Problem: Psychosocial Needs:  Goal: Level of anxiety will decrease  Outcome: PROGRESSING SLOWER THAN EXPECTED  Note:   Pt becomes anxious d/t pain. Pain regime in place and nonpharmacologic techniques in place. Coping mechanisms and emotional support given.

## 2019-08-01 NOTE — DISCHARGE PLANNING
Anticipated Discharge Disposition: TBD    Action: LSW spoke to RNShamar and she stated that pt does have TB.   LSW has called Florinda CANO to update.     Barriers to Discharge: None    Plan: LSW to assist as needed.

## 2019-08-02 ENCOUNTER — APPOINTMENT (OUTPATIENT)
Dept: RADIOLOGY | Facility: MEDICAL CENTER | Age: 69
DRG: 356 | End: 2019-08-02
Attending: INTERNAL MEDICINE
Payer: MEDICARE

## 2019-08-02 PROCEDURE — A9270 NON-COVERED ITEM OR SERVICE: HCPCS | Performed by: INTERNAL MEDICINE

## 2019-08-02 PROCEDURE — 700111 HCHG RX REV CODE 636 W/ 250 OVERRIDE (IP): Performed by: INTERNAL MEDICINE

## 2019-08-02 PROCEDURE — 700102 HCHG RX REV CODE 250 W/ 637 OVERRIDE(OP): Performed by: INTERNAL MEDICINE

## 2019-08-02 PROCEDURE — 770001 HCHG ROOM/CARE - MED/SURG/GYN PRIV*

## 2019-08-02 PROCEDURE — A9270 NON-COVERED ITEM OR SERVICE: HCPCS | Performed by: FAMILY MEDICINE

## 2019-08-02 PROCEDURE — 700102 HCHG RX REV CODE 250 W/ 637 OVERRIDE(OP): Performed by: FAMILY MEDICINE

## 2019-08-02 PROCEDURE — 99232 SBSQ HOSP IP/OBS MODERATE 35: CPT | Performed by: INTERNAL MEDICINE

## 2019-08-02 PROCEDURE — 73501 X-RAY EXAM HIP UNI 1 VIEW: CPT | Mod: LT

## 2019-08-02 RX ORDER — ACETAMINOPHEN 500 MG
1000 TABLET ORAL 3 TIMES DAILY
Status: DISCONTINUED | OUTPATIENT
Start: 2019-08-02 | End: 2019-08-10 | Stop reason: HOSPADM

## 2019-08-02 RX ORDER — OXYCODONE HYDROCHLORIDE 5 MG/1
5-10 TABLET ORAL EVERY 6 HOURS PRN
Status: DISCONTINUED | OUTPATIENT
Start: 2019-08-02 | End: 2019-08-10 | Stop reason: HOSPADM

## 2019-08-02 RX ADMIN — POTASSIUM CHLORIDE 20 MEQ: 20 TABLET, EXTENDED RELEASE ORAL at 05:39

## 2019-08-02 RX ADMIN — PRAMIPEXOLE DIHYDROCHLORIDE 0.25 MG: 0.5 TABLET ORAL at 13:25

## 2019-08-02 RX ADMIN — MAGNESIUM 64 MG (MAGNESIUM CHLORIDE) TABLET,DELAYED RELEASE 64 MG: at 05:39

## 2019-08-02 RX ADMIN — OXYCODONE HYDROCHLORIDE 5 MG: 5 TABLET ORAL at 00:39

## 2019-08-02 RX ADMIN — SENNOSIDES, DOCUSATE SODIUM 2 TABLET: 50; 8.6 TABLET, FILM COATED ORAL at 05:39

## 2019-08-02 RX ADMIN — LEVOFLOXACIN 750 MG: 750 TABLET, FILM COATED ORAL at 05:39

## 2019-08-02 RX ADMIN — TEMAZEPAM 15 MG: 15 CAPSULE ORAL at 20:17

## 2019-08-02 RX ADMIN — HEPARIN SODIUM 5000 UNITS: 5000 INJECTION, SOLUTION INTRAVENOUS; SUBCUTANEOUS at 05:40

## 2019-08-02 RX ADMIN — LEVETIRACETAM 500 MG: 500 TABLET, FILM COATED ORAL at 05:39

## 2019-08-02 RX ADMIN — GABAPENTIN 400 MG: 400 CAPSULE ORAL at 17:10

## 2019-08-02 RX ADMIN — ISONIAZID 300 MG: 300 TABLET ORAL at 05:39

## 2019-08-02 RX ADMIN — PYRAZINAMIDE 1000 MG: 500 TABLET ORAL at 05:39

## 2019-08-02 RX ADMIN — GABAPENTIN 400 MG: 400 CAPSULE ORAL at 05:39

## 2019-08-02 RX ADMIN — FERROUS SULFATE TAB 325 MG (65 MG ELEMENTAL FE) 325 MG: 325 (65 FE) TAB at 08:43

## 2019-08-02 RX ADMIN — LOSARTAN POTASSIUM 75 MG: 50 TABLET ORAL at 05:40

## 2019-08-02 RX ADMIN — METOPROLOL TARTRATE 25 MG: 25 TABLET, FILM COATED ORAL at 17:10

## 2019-08-02 RX ADMIN — ACETAMINOPHEN 1000 MG: 500 TABLET ORAL at 12:37

## 2019-08-02 RX ADMIN — OXYCODONE HYDROCHLORIDE 5 MG: 5 TABLET ORAL at 08:44

## 2019-08-02 RX ADMIN — ACETAMINOPHEN 500 MG: 500 TABLET ORAL at 05:51

## 2019-08-02 RX ADMIN — PRAMIPEXOLE DIHYDROCHLORIDE 0.25 MG: 0.5 TABLET ORAL at 08:43

## 2019-08-02 RX ADMIN — LEVETIRACETAM 500 MG: 500 TABLET, FILM COATED ORAL at 17:10

## 2019-08-02 RX ADMIN — CINACALCET HYDROCHLORIDE 60 MG: 30 TABLET, COATED ORAL at 05:40

## 2019-08-02 RX ADMIN — PYRIDOXINE HCL TAB 50 MG 100 MG: 50 TAB at 05:40

## 2019-08-02 RX ADMIN — MAGNESIUM 64 MG (MAGNESIUM CHLORIDE) TABLET,DELAYED RELEASE 64 MG: at 20:17

## 2019-08-02 RX ADMIN — ATORVASTATIN CALCIUM 10 MG: 10 TABLET, FILM COATED ORAL at 17:10

## 2019-08-02 RX ADMIN — RIFAMPIN 600 MG: 300 CAPSULE ORAL at 05:39

## 2019-08-02 RX ADMIN — GABAPENTIN 400 MG: 400 CAPSULE ORAL at 12:37

## 2019-08-02 RX ADMIN — SENNOSIDES, DOCUSATE SODIUM 2 TABLET: 50; 8.6 TABLET, FILM COATED ORAL at 17:10

## 2019-08-02 RX ADMIN — PRAMIPEXOLE DIHYDROCHLORIDE 0.25 MG: 0.5 TABLET ORAL at 20:17

## 2019-08-02 RX ADMIN — ACETAMINOPHEN 1000 MG: 500 TABLET ORAL at 17:10

## 2019-08-02 RX ADMIN — ETHAMBUTOL HYDROCHLORIDE 800 MG: 400 TABLET, FILM COATED ORAL at 05:39

## 2019-08-02 RX ADMIN — AMLODIPINE BESYLATE 10 MG: 5 TABLET ORAL at 05:40

## 2019-08-02 RX ADMIN — HEPARIN SODIUM 5000 UNITS: 5000 INJECTION, SOLUTION INTRAVENOUS; SUBCUTANEOUS at 17:10

## 2019-08-02 RX ADMIN — OMEPRAZOLE 20 MG: 20 CAPSULE, DELAYED RELEASE ORAL at 05:39

## 2019-08-02 RX ADMIN — Medication 1 CAPSULE: at 08:43

## 2019-08-02 RX ADMIN — OXYCODONE HYDROCHLORIDE 5 MG: 5 TABLET ORAL at 18:39

## 2019-08-02 ASSESSMENT — ENCOUNTER SYMPTOMS
COUGH: 0
NECK PAIN: 0
VOMITING: 0
POLYDIPSIA: 0
HEADACHES: 0
SHORTNESS OF BREATH: 0
FLANK PAIN: 0
SPUTUM PRODUCTION: 0
BACK PAIN: 0
SPEECH CHANGE: 0
HEARTBURN: 0
FEVER: 0
HALLUCINATIONS: 0
PALPITATIONS: 0
HEMOPTYSIS: 0
FOCAL WEAKNESS: 0
BLURRED VISION: 0
WEIGHT LOSS: 0
BRUISES/BLEEDS EASILY: 0
DOUBLE VISION: 0
NAUSEA: 0
DIZZINESS: 0
TREMORS: 0
PHOTOPHOBIA: 0
CHILLS: 0
NERVOUS/ANXIOUS: 0
ORTHOPNEA: 0

## 2019-08-02 ASSESSMENT — LIFESTYLE VARIABLES: SUBSTANCE_ABUSE: 0

## 2019-08-02 NOTE — DISCHARGE PLANNING
Received Choice form at 4418  Agency/Facility Name: [facility]  Referral sent per Choice form @ [id number]

## 2019-08-02 NOTE — PROGRESS NOTES
Hospital Medicine Daily Progress Note    Date of Service  7/31/2019  Chief Complaint  68 y.o. female admitted 5/29/2019 with right iliac and gluteus abscess    Hospital Course  Patient is a 68 year old with history of breast cancer, copd, dm, gerd, dl and htn who was admitted with right iliac gluteus abscess.  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    Interval Problem Update  Appears to be comfortable.  Complaining of chronic left hip/left buttock pain under fair control  Afebrile, no overnight events.    Consultants/Specialty  ID  Neurosurgery  Ortho    Code Status  Full    Disposition  snf pending, would like to go to snf near family in [place]    Review of Systems  Review of Systems   Constitutional: Negative for chills, fever and weight loss.   HENT: Negative for ear pain, hearing loss and tinnitus.    Eyes: Negative for blurred vision, double vision and photophobia.   Respiratory: Negative for cough, hemoptysis and sputum production.    Cardiovascular: Negative for chest pain, palpitations and orthopnea.   Gastrointestinal: Negative for heartburn, nausea and vomiting.   Genitourinary: Negative for dysuria, flank pain, frequency and hematuria.   Musculoskeletal: Positive for joint pain. Negative for back pain and neck pain.   Skin: Negative for itching and rash.   Neurological: Negative for tremors, speech change, focal weakness and headaches.   Endo/Heme/Allergies: Negative for environmental allergies and polydipsia. Does not bruise/bleed easily.   Psychiatric/Behavioral: Negative for hallucinations and substance abuse. The patient is not nervous/anxious.         Physical Exam  Temp:  [36.2 °C (97.2 °F)-36.4 °C (97.5 °F)] 36.4 °C (97.5 °F)  Pulse:  [70-90] 70  Resp:  [16-18] 16  BP: (117-133)/(54-71) 133/71  SpO2:  [97 %-98 %] 98 %    Physical Exam   Constitutional: She is oriented to person,  place, and time. She appears well-developed and well-nourished.   HENT:   Head: Normocephalic and atraumatic.   Eyes: Pupils are equal, round, and reactive to light. EOM are normal.   Neck: Normal range of motion. Neck supple.   Cardiovascular: Normal rate, regular rhythm and normal heart sounds.   Pulmonary/Chest: Effort normal and breath sounds normal.   Abdominal: Soft. Bowel sounds are normal.   Musculoskeletal: Normal range of motion.   Neurological: She is alert and oriented to person, place, and time.   Skin: Skin is warm and dry.   Psychiatric: She has a normal mood and affect. Cognition and memory are impaired. She exhibits abnormal recent memory.   Nursing note and vitals reviewed.  I examined the patient on 8/1.  No significant changes except as documented    Fluids    Intake/Output Summary (Last 24 hours) at 7/31/2019 1636  Last data filed at 7/30/2019 2000  Gross per 24 hour   Intake 50 ml   Output --   Net 50 ml       Laboratory  Recent Labs     07/29/19  0341 07/30/19  0322 07/31/19  0350   WBC 4.2* 3.3* 3.0*   RBC 3.77* 4.06* 3.93*   HEMOGLOBIN 11.0* 11.9* 11.9*   HEMATOCRIT 36.0* 37.8 36.9*   MCV 95.5 93.1 93.9   MCH 29.2 29.3 30.3   MCHC 30.6* 31.5* 32.2*   RDW 53.6* 52.1* 52.1*   PLATELETCT 230 222 210   MPV 8.9* 9.2 9.5     Recent Labs     07/29/19  0341 07/30/19  0322   SODIUM 136 140   POTASSIUM 3.1* 3.8   CHLORIDE 103 104   CO2 25 26   GLUCOSE 142* 101*   BUN 21 17   CREATININE 0.67 0.69   CALCIUM 9.8 10.2                   Imaging  IR-INJ-EXT PSEUDOANEURYSM PERC   Final Result      1.  Ultrasound guided thrombin injection for treatment of a RIGHT gluteal 3.6 cm pseudoaneurysm.      US-EXTREMITY ARTERY LOWER UNILAT RIGHT   Final Result      Right gluteal pseudoaneurysm measuring 3.65 x 2.24 x 2.31 cm.      CTA ABDOMEN PELVIS W & W/O POST PROCESS   Final Result      1.  Interval enlargement of a hyperdense ovoid masslike structure immediately posterior to the right ilium, possibly representing  a pseudoaneurysm.   2.  Stable thin-walled apparent fluid-filled structure immediately deep to the right ilium.   3.   Minimal retroperitoneal adenopathy, grossly stable.   4.  Cholelithiasis.      MR-BRAIN-WITH & W/O   Final Result      1.  Innumerable nodular enhancing lesions throughout the brain parenchyma both the supra and infratentorial compartments predominantly near the gray-white junction. There has been interval increase in size of several of these lesions since previous exam.    These changes may be secondary to metastatic disease or granulomatous disease.      2.  Interval right parietal craniotomy with underlying elliptical postsurgical defect.      3.  Interval increase in size of index right thalamic lesion which has increased vasogenic edema since previous exam.      4.  Age-related cerebral atrophy.      IR-PICC LINE PLACEMENT W/ GUIDANCE > AGE 5   Final Result                  Ultrasound-guided PICC placement performed by qualified nursing staff as    above.          [id number] VIEW   Final Result         1.  Moderate stool in the colon suggests changes of constipation, otherwise nonspecific bowel gas pattern   2.  Atherosclerosis      CT-NEEDLE BX-ABD-RETROPERITONL   Final Result      1.  CT GUIDED biopsy of a right pelvic phlegmon.      2. Significant interval enlargement of a right gluteal pseudoaneurysm. CTA and consideration for possible intervention either with direct thrombin injection or endovascular treatment was suggested. This was conveyed to Dr. Alatorre on 7/11/2019 via [place]    text at 1750 hours.      US-EXTREMITY VENOUS LOWER BILAT   Final Result      IR-US GUIDED PIV   Final Result    Ultrasound-guided PERIPHERAL IV INSERTION performed by    qualified nursing staff as above.            MR-LUMBAR SPINE-WITH & W/O   Final Result         1.  Grade 2-3 spondylolisthesis at the L5-S1 level with bilateral spondylolysis of the pars interarticularis. This causes severe bilateral neural  foraminal stenosis at this level.      2.  Interval removal of posterior fusion hardware at the lumbosacral junction.      3.  Diffuse osteomyelitis involving the L5 vertebral body and the first 3 sacral segments.      4.  Chronic discitis at the L5-S1 level with anterior paraspinous abscess at this level and anterior to the S1 sacral segment.      5.  Smaller intraosseous bone bone abscesses or areas of necrosis noted in the sacrum.      6.  There is also evidence of osteomyelitis involving the jose antonio and sacrum bilaterally along the course of the previous sacral fixation screw. There is a large 4 cm abscess noted in the right gluteal soft tissues in a smaller 3 cm chronic appearing    abscess in the left gluteal musculature.      7.  There is a 1.5 cm intraosseous abscess or area of necrosis in the right posterior ilium.      8.  There is a 3.3 cm centimeters multiloculated abscess anterior to the right sacroiliac joint.      MR-STEALTH BRAIN WITH & W/O   Final Result         1.  Innumerable subcentimeter brain metastases, many at the gray-white junction but also multiple noted throughout the basal ganglia and brainstem.      2.  Largest index lesion is noted in the right thalamus and has increased in size since previous exam and currently measures 9 mm and has a moderate amount of surrounding vasogenic edema.      US-EXTREMITY NON VASCULAR UNILATERAL RIGHT   Final Result      No right hip joint effusion. No soft tissue fluid collection.      MR-BRAIN-WITH & W/O   Final Result      1.  Innumerable supra and infratentorial enhancing nodules are again noted throughout the brain parenchyma with multiple lesions appearing somewhat larger and with increased enhancement. No associated diffusion restriction. Unchanged differential    considerations including bacterial or fungal infection versus metastatic disease.   2.  Mild diffuse cerebral substance loss.   3.  Mild microangiopathic ischemic change versus demyelination  or gliosis.      CT-NEEDLE BX-DEEP BONE   Final Result      CT GUIDED RIGHT ILIUM DEEP BONE BIOPSY. SAMPLES WERE SENT TO [facility]CRO[facility]Y FOR ANALYSIS.      CT-PELVIS WITH   Final Result         1.  No significant interval change in the size of the RIGHT iliac muscle fluid collections   2.  Hyperdense RIGHT gluteal lesion moderately suspicious for pseudoaneurysm with adjacent hematoma.   3.  Changes in the RIGHT iliac bone, BILATERAL sacrum and LEFT iliac bone suspicious for osteomyelitis   4.  Changes at L5-S1 suspicious for discitis osteomyelitis      CT-PELVIS WITH   Final Result      1.  Residual or recurrent abscess within the right iliacus muscle measuring 2.5 x 1.8 cm. It slightly smaller than on 5/28/2019      2.  Interval removal of hardware from the iliac bones and sacrum      3.  Lytic change of the right iliac bone and the sacrum. This could be related to hardware versus osteomyelitis.      4.  Subacute fractures of the right ilium, sacrum, and there is lucency within the left iliac bone that is likely from hardware      MR-BRAIN-WITH & W/O   Final Result      1.  Interval progression of supra and infratentorial intra-axial nodules and associated edema. This short-term interval progression favors infection over neoplasm though both remain considerations.   2.  Mild atrophy      DX-PORTABLE FLUOROSCOPY < 1 HOUR   Final Result         Portable fluoroscopy utilized for 2 minutes.      DX-PELVIS-1 OR 2 VIEWS   Final Result      Status post hardware removal from the right SI joint      DX-CHEST-PORTABLE (1 VIEW)   Final Result      Interstitial prominence could be due to pulmonary edema or hypoventilatory change.      DX-CHEST-LIMITED (1 VIEW)   Final Result      LEFT basilar underinflation atelectasis or pneumonia      CT-DRAIN-HEMATOMA - SEROMA   Final Result      CT-guided RIGHT pelvic fluid collection aspiration, laboratory evaluation pending              Assessment/Plan  * Brain lesion- (present on  admission)  Assessment & Plan  Right-sided craniotomy for resection of superficial mass  6/28/2019  Continue Isoniazid, Rifampin, Ethambutol, Pyrazinamide per ID, discussed with ID they have reached out to [facility] to discuss outpatient regimen  Continue Keppra for seizure prophylaxis, per ID consider restarting steroids if she has breakthrough seizures  No seizures observed    Hypokalemia- (present on admission)  Assessment & Plan  Replacing  following    Osteomyelitis (HCC)- (present on admission)  Assessment & Plan  Not osteo- actually disseminated TB  ID following    Granulomatous disease - (present on admission)  Assessment & Plan  Isoniazid, Rifampin, Ethambutol, Pyrazinamide    Pseudoaneurysm following procedure (HCC)- (present on admission)  Assessment & Plan    POD #5 s/p IR thrombin injection for thrombosed psedudoanerysm    Non-severe protein-calorie malnutrition (HCC)- (present on admission)  Assessment & Plan  Nutrition consult      Sepsis (HCC)- (present on admission)  Assessment & Plan  This is sepsis (without associated acute organ dysfunction).    Source - Right Iliac and Gluteus medius abscess  Resolved    Hypomagnesemia- (present on admission)  Assessment & Plan  Oral magnesium    Abscess of right iliac muscle and right gluteus medius muscle- (present on admission)  Assessment & Plan  CT guided pelvic aspiration 5/30/2019  Hardware removal, pelvis 6/5/2019  CT guided deep bone biopsy 6/19/2019  CT guided aspiration/biopsy of a R gluteal fluid collection/phlegmon 7/11/2019 - consistent with TB  Continue Isoniazid, Rifampin, Ethambutol, Pyrazinamide  Associated pain improving  Levaquin to continue for 9-12 months -- recommended by [facility] and ID      Dysphagia- (present on admission)  Assessment & Plan  Dysphagia 3  Speech following    History of DVT (deep vein thrombosis)- (present on admission)  Assessment & Plan  Xarelto was held due to hip hematoma and rifampin  Consider starting  coumadin in 1-2 weeks if h/h remains stable    Essential hypertension- (present on admission)  Assessment & Plan  Continue Metoprolol, Losartan and Amlodipine as tolerated  Blood pressure stable    RLS (restless legs syndrome)- (present on admission)  Assessment & Plan  Pramipexole    Chronic pain- (present on admission)  Assessment & Plan    Opiates were causing confusion   Has now been tapered off long acting opiates and doing well  Continue tapering off as tolerated      History of breast cancer- (present on admission)  Assessment & Plan  history left mastectomy and breast implant.      COPD (chronic obstructive pulmonary disease) (HCC)- (present on admission)  Assessment & Plan  Continue RT protocol  No s/o exacerbation       VTE prophylaxis: Heparin

## 2019-08-02 NOTE — CARE PLAN
Problem: Communication  Goal: The ability to communicate needs accurately and effectively will improve  Outcome: PROGRESSING AS EXPECTED     Problem: Safety  Goal: Will remain free from injury  Outcome: PROGRESSING AS EXPECTED  Goal: Will remain free from falls  Outcome: PROGRESSING AS EXPECTED     Problem: Infection  Goal: Will remain free from infection  Outcome: PROGRESSING AS EXPECTED     Problem: Venous Thromboembolism (VTW)/Deep Vein Thrombosis (DVT) Prevention:  Goal: Patient will participate in Venous Thrombosis (VTE)/Deep Vein Thrombosis (DVT)Prevention Measures  Outcome: PROGRESSING AS EXPECTED     Problem: Bowel/Gastric:  Goal: Normal bowel function is maintained or improved  Outcome: PROGRESSING AS EXPECTED  Goal: Will not experience complications related to bowel motility  Outcome: PROGRESSING AS EXPECTED     Problem: Knowledge Deficit  Goal: Knowledge of disease process/condition, treatment plan, diagnostic tests, and medications will improve  Outcome: PROGRESSING AS EXPECTED  Goal: Knowledge of the prescribed therapeutic regimen will improve  Outcome: PROGRESSING AS EXPECTED     Problem: Discharge Barriers/Planning  Goal: Patient's continuum of care needs will be met  Outcome: PROGRESSING AS EXPECTED     Problem: Mobility  Goal: Risk for activity intolerance will decrease  Outcome: PROGRESSING AS EXPECTED     Problem: Skin Integrity  Goal: Risk for impaired skin integrity will decrease  Outcome: PROGRESSING AS EXPECTED     Problem: Pain Management  Goal: Pain level will decrease to patient's comfort goal  Outcome: PROGRESSING AS EXPECTED     Problem: Respiratory:  Goal: Respiratory status will improve  Outcome: PROGRESSING AS EXPECTED     Problem: Fluid Volume:  Goal: Will maintain balanced intake and output  Outcome: PROGRESSING AS EXPECTED     Problem: Urinary Elimination:  Goal: Ability to reestablish a normal urinary elimination pattern will improve  Outcome: PROGRESSING AS EXPECTED     Problem:  Psychosocial Needs:  Goal: Level of anxiety will decrease  Outcome: PROGRESSING AS EXPECTED

## 2019-08-02 NOTE — PROGRESS NOTES
Infectious Disease Progress Note    Author: Karen Garcia M.D. Date & Time of service: 8/2/2019  3:16 PM    Chief Complaint:  Brain abscess, gluteal abscess  Vertebral OM     Interval History:  68 y.o. female admitted 5/29/2019 as a transfer from [facility] since 4/30/2019. + diabetes, SANTOSH of right hip with hardware, and breast cancer who was originally admitted to [facility] on 03/08/2019 for right hip and pelvic pain.  Work-up revealed a right iliopsoas lesion, gluteal abscess, and mult brain abscesses     7/4 AF much more alert and conversant today-c/o left hip pain, unrelenting and would like parietal dressing changed. HA at surgical site.  Denies SE abx. No new neurodeficits  7/5 afebrile WBC 5.4.  Patient sleeping but arousable.  She denies any headache, nausea, vomiting.  7/6 afebrile WBC 6.5.  Patient sitting up in chair and having excruciating back pain  7/8 Pt has no specific complaints, she is mildly confused today.  She seems to be tolerating her medications with no significant lab abnormalities.  7/9 AF, O2 RA, Significant left hip pain today.  She does appear to be tolerating her antibiotics.  Ultrasound of bilateral lower extremities has been ordered.  7/10 AF, O2 RA,  Plan for IR drainage of sacral collection soon.   7/11 AF, O2 RA,  Pt continued on RIPE and ampho.  She is tolerating well overall, requiring some mag and potassium replacement.  She is complaining of severe pain in left hip again today.  Plan for biopsy later today.   7/12  IR biopsy of R gluteal abscess/hip. AF, O2 2 L NC,   tolerating antibiotics so far.  She is quite somnolent today but per nursing she was oriented x4 earlier  7/14 AF, O2 RA, more alert today, conversant and asking for advance in diet. Left hip pain ongoing but improved.   7/15 afebrile WBC 4.4.  Patient's speech is somewhat muffled but she is alert and responding to questions appropriately.  MRI shows worsening lesions and amphotericin stopped.  Nurse reports  waxing and waning mental status  7/16 afebrile.  Patient sleeping but arousable.  She is answer questions appropriately but then forgets that she is in the hospital.  7/17 afebrile WBC 4.9 patient is very drowsy as she states she did not sleep well yesterday.  She is asking about whether or not she has a fungal infection.  Plan of care discussed with patient.  7/19 afebrile patient continues to only endorse left hip pain exacerbated by sitting up in the chair.  She sat in the chair for 30 minutes yesterday.  Getting out of bed is limited secondary to left hip pain.  Mental status remains about the same with intermittent waxing and waning.  No new issues today per RN  7/21 afebrile patient is much more awake and alert today.  She is answering questions appropriately.  Yesterday she was complaining of some abdominal pain so CT scan was done and revealed interval enlargement of a hyperdense ovoid masslike structure posterior to the right ilium concerning for pseudoaneurysm.  7/22/2019 no fevers.  No new issues overnight.  Had her ultrasound today follow the results  7/23/2019-no fevers.  Ongoing pain.  The AFB cultures have come back positive for TB  7/24/2019 no fevers.  No new issues overnight.  Very anxious and agitated  7/25/2019 no fevers.  No new issues.  Says she feels slightly better.  7/26 AF WBC 4.5 tolerating meds well-wants cream to decrease size of surgical scar-has right-sided rib pain and joint pain  7/27 AF ambulating with assist with walker-no new complaints  7/28 AF same pain complaint-remains debilitated with intermittent confusion  7/29 AF WBC 4.2 no clinical change  7/30 AF WBC 3.3 sleeping sounding-dw RN has been complaining of hip pain but able to ambulate  7/31 AF WBC 3 ambulating in peng with rolling walker-NAD. No new complaints  8/1 AF knitting in bed-looks comfortable but requesting more pain meds  8/2 AF No CBC no new complaints    Review of Systems:  Review of Systems   Constitutional:  Negative for chills and fever.   Respiratory: Negative for cough and shortness of breath.    Cardiovascular: Negative for chest pain.   Gastrointestinal: Negative for nausea.   Musculoskeletal: Positive for joint pain.   Neurological: Negative for dizziness.   All other systems reviewed and are negative.      Hemodynamics:  Temp (24hrs), Av.5 °C (97.7 °F), Min:36.2 °C (97.2 °F), Max:36.8 °C (98.2 °F)  Temperature: 36.5 °C (97.7 °F)  Pulse  Av.8  Min: 49  Max: 195   Blood Pressure : 126/71       Physical Exam:  Physical Exam   Constitutional: She is oriented to person, place, and time. No distress.   Elderly  Chronically ill-appearing   HENT:   Mouth/Throat: Oropharynx is clear and moist. No oropharyngeal exudate.   Right parietal surgical site healed   Eyes: Right eye exhibits no discharge. Left eye exhibits no discharge.   Neck: Neck supple. No JVD present.   Cardiovascular: Normal rate and regular rhythm.   Pulmonary/Chest: Effort normal. No stridor. No respiratory distress.   Abdominal: Soft. She exhibits no distension. There is no tenderness.   Musculoskeletal: She exhibits no edema.   Neurological: She is alert and oriented to person, place, and time. Coordination normal.   Skin: Skin is warm. No rash noted. She is not diaphoretic. No erythema.   Nursing note and vitals reviewed.      Meds:    Current Facility-Administered Medications:   •  oxyCODONE immediate-release  •  acetaminophen  •  magnesium chloride  •  levoFLOXacin  •  hyoscyamine-maalox plus-lidocaine viscous  •  riFAMPin  •  thrombin  •  temazepam  •  potassium chloride SA  •  losartan  •  pramipexole  •  senna-docusate  •  gabapentin  •  ferrous sulfate  •  diphenhydrAMINE  •  heparin  •  pyridoxine  •  levETIRAcetam  •  isoniazid  •  ethambutol  •  pyrazinamide  •  Pharmacy Consult Request  •  labetalol  •  lactobacillus rhamnosus  •  lidocaine  •  cyclobenzaprine  •  Respiratory Care per Protocol  •  amLODIPine  •  cinacalcet  •   atorvastatin  •  omeprazole  •  metoprolol  •  ondansetron  •  ondansetron    Labs:  Recent Labs     07/31/19  0350   WBC 3.0*   RBC 3.93*   HEMOGLOBIN 11.9*   HEMATOCRIT 36.9*   MCV 93.9   MCH 30.3   RDW 52.1*   PLATELETCT 210   MPV 9.5   NEUTSPOLYS 43.90*   LYMPHOCYTES 34.50   MONOCYTES 11.80   EOSINOPHILS 7.80*   BASOPHILS 1.00     No results for input(s): SODIUM, POTASSIUM, CHLORIDE, CO2, GLUCOSE, BUN, CPKTOTAL in the last 72 hours.  No results for input(s): ALBUMIN, TBILIRUBIN, ALKPHOSPHAT, TOTPROTEIN, ALTSGPT, ASTSGOT, CREATININE in the last 72 hours.    Imaging:  MRI on 7/14/2019  Impression:       1.  Innumerable nodular enhancing lesions throughout the brain parenchyma both the supra and infratentorial compartments predominantly near the gray-white junction. There has been interval increase in size of several of these lesions since previous exam.   These changes may be secondary to metastatic disease or granulomatous disease.    2.  Interval right parietal craniotomy with underlying elliptical postsurgical defect.    3.  Interval increase in size of index right thalamic lesion which has increased vasogenic edema since previous exam.    4.  Age-related cerebral atrophy.       Micro:  Results     Procedure Component Value Units Date/Time    Fungal Culture [[id number]] Collected:  07/11/19 1745    Order Status:  Completed Specimen:  Tissue Updated:  08/02/19 1104     Significant Indicator NEG     Source TISS     Site Right Hip Cores     Culture Result No fungal growth to date.    Narrative:       CALL  Mckeon  TEREZA tel. [phone],  CALLED  TEREZA tel. [phone] 08/02/2019, 11:03, RB PERF. RESULTS CALLED  TO:[id number].    AFB Culture [[id number]]  (Abnormal) Collected:  07/11/19 1745    Order Status:  Completed Specimen:  Tissue Updated:  08/02/19 1104     Significant Indicator POS     Source TISS     Site Right Hip Cores     Culture Result Culture in progress.  Acid fast bacilli detected by MGIT 960 instrument.        "AFB Smear Results No acid fast bacilli seen.    Narrative:       CALL  Mckeon  TEREZA tel. [phone],  CALLED  TEREZA tel. [phone] 08/02/2019, 11:03, RB PERF. RESULTS CALLED  TO:[id number].          Assessment:  Active Hospital Problems    Diagnosis   • *Brain lesion [G93.9]   • Granulomatous disease  [L92.9]   • Abscess of right iliac muscle and right gluteus medius muscle [L02.91]   • Pseudoaneurysm following procedure (HCC) [I97.89, I72.9]   • Sepsis (HCC) [A41.9]   • History of breast cancer [Z85.3]       Assessment/Plan:   Brain lesions  Tubercular  Encephalopathy, intermittent waxing and waning  MRI 4/23 \"supra and infratentorial brain parenchyma. Decrease in the size of the lesions. Interval reduction in the extent of the surrounding white matter edema consistent with improvement/treatment response of the most of the multifocal brain abscesses.  MRI on 6/8 with interval progression of supra and infratentorial intra--axial nodules and associated edema.  New right thalamus lesion. Radiology favors infection over neoplasm (had been off abx)  Mult blood cultures neg; CHAS neg 3/15 and 5/24  MRI 6/22 worsening CNS lesions  S/p right craniotomy and resection of mass with biopsy on 6/28. Biopsy-per note fungal appearing elements seen per Neurosurgery-path   +necrotizing granulomas. All stains negative in EPIC  Fungal culture- negative to date  Bacterial cultures-negative to date  Brucella ab - negative  Coxiella ab - negative   Histo ab serum & antigen urine-negative  Blasto ab - negative   Repeat MRI on 7/14 + increase in in nodular enhancing lesions throughout the brain parenchyma  Continue RIPE+B6 for 2 months (through 9/2/2019) followed by Rifamate with B6 for an additional 7-12 months  Follow-up brain biopsy specimen sent to [facility] for PCR testing, bacterial, fungal, AFB   Repeat MRI mid August     Gluteal and iliopsoas abscess, on treatment  OM L5, 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 The collection within the right gluteal muscle has unchanged while the fluid collection within the right iliacis muscle is increased somewhat in size.\" 2.7 cm  Cultures 3/29 and 4/4 neg  MRI on 5/20/2019 - interval decrease in collection in R gluteal muscle and persistent multiloculated collection in R iliacus muscle.   CT 5/28 no change  s/p drainage on 5/30/2019-cultures negative  S/p removal hardware 6/5-no cultures done  CT on 6/8 with residual abscess in the right iliacus muscle, 2.5 x 1.8 cm, slightly smaller than prior  s/p CT-guided deep bone biopsy 6/19/2019.  Cx +Mtb  Hip core +AFB  MRI 6/28 chronic discitis, diffuse OM L5, 3 and 4 cm abscesses right glute, 1.5 cm abscess or necrosis right posterior ileum, 3.3 cm abscess right SI joint  Continue TB therapy as above with levofloxacin per [facility]     +QuantiGold  Path with necrotizing granulomas  AFB stain neg  AFB cultures + 6/19  Started RIPE +B6 7/3  Monitor for visual changes while on ethambutol.    Ophthalmology evaluation for baseline vision as well as color vision testing      Type 2 DM, chronic  Hemoglobin A1c 6.3   Keep BS under 150 to help control current infection      Will follow up at [facility] for meds and case management after discharge    I have performed a physical exam and reviewed and updated ROS as of today.  In review of yesterday's note dated  8/2/2019, there are no changes except as documented above.              "

## 2019-08-02 NOTE — PROGRESS NOTES
Pt AAOx3, disoriented to time.   Ambulating x1 w/FWW.   Complaining of pain, medicated per MAR.    No acute changes, VSS.   Hourly rounding in place, will continue to monitor.

## 2019-08-02 NOTE — PROGRESS NOTES
Pt had good night. Given PRN pain medication hs, slept most of night. Pt was incontinent of urine X1. X1 assist to bathroom with front wheeled walker. Pt had some confusion and agitation this morning. Took PO meds with applesauce. No other complaints at this time, will continue to monitor and report off to oncoming nurse.

## 2019-08-02 NOTE — DISCHARGE PLANNING
Anticipated Discharge Disposition: TBD    Action: LSW tc pt's brother, René ([phone]). LSW explained that the referrals that were sent to SNF's in [place] had all declined. LSW explained that [facility] at one point had accepted pt. LSW and René discussed [facility] again and René states he would love to have her closer, but he understands that she needs to go to SNF. René stated he needs to discuss this with his other brother and will return my call.   René states that when pt is d/c from SNF he will be able to provide transportation back home.     Barriers to Discharge: Placement    Plan: LSW await René's phone call. LSW to assist as needed.     Addendum 1400  LSW received call from pt's brother Delonte ([phone]) and René. They stated that they would like to try one more facility and discussed a SNF that they were able to visit. They spoke to the director of [facility], Saray. They stated Saray thinks pt would be a good candidate for their facility. It was requested for a referral to be sent to [facility] ([address] [place] [address] [phone]) Delonte stated that if it was okay by MD, if she got accepted to facility they could provide transportation there.   LSW received verbal consent to send referral.   LSW faxed to [facility].

## 2019-08-02 NOTE — CARE PLAN
Problem: Skin Integrity  Goal: Risk for impaired skin integrity will decrease  Outcome: PROGRESSING AS EXPECTED  Note:   Moves self frequently in bed and ambulates when prompted.     Problem: Safety  Goal: Will remain free from falls  Outcome: PROGRESSING SLOWER THAN EXPECTED  Note:   Pt slightly impulsive at times

## 2019-08-02 NOTE — PROGRESS NOTES
Hospital Medicine Daily Progress Note    Date of Service  8/2/2019    Chief Complaint  68 y.o. female admitted 5/29/2019 with right iliac and gluteus abscess    Hospital Course  Patient is a 68 year old with history of breast cancer, copd, dm, gerd, dl and htn who was admitted with right iliac gluteus abscess.  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    Interval Problem Update  Patient is complaining of worsening of pain in the hip on the left side, especially when she is sitting on the toilet.  I will order left hip and pelvic x-ray  I started her on scheduled Tylenol  Consultants/Specialty  ID  Neurosurgery  Ortho    Code Status  Full    Disposition  snf pending, would like to go to snf near family in [place]      Review of Systems  Review of Systems   Constitutional: Negative for chills, fever and weight loss.   HENT: Negative for ear pain, hearing loss and tinnitus.    Eyes: Negative for blurred vision, double vision and photophobia.   Respiratory: Negative for cough, hemoptysis and sputum production.    Cardiovascular: Negative for chest pain, palpitations and orthopnea.   Gastrointestinal: Negative for heartburn, nausea and vomiting.   Genitourinary: Negative for dysuria, flank pain, frequency and hematuria.   Musculoskeletal: Positive for joint pain. Negative for back pain and neck pain.   Skin: Negative for itching and rash.   Neurological: Negative for tremors, speech change, focal weakness and headaches.   Endo/Heme/Allergies: Negative for environmental allergies and polydipsia. Does not bruise/bleed easily.   Psychiatric/Behavioral: Negative for hallucinations and substance abuse. The patient is not nervous/anxious.         Physical Exam  Temp:  [36.2 °C (97.2 °F)-36.8 °C (98.2 °F)] 36.5 °C (97.7 °F)  Pulse:  [] 107  Resp:  [13-16] 16  BP: ()/(43-71) 126/71  SpO2:  [91 %-99 %]  91 %    Physical Exam   Constitutional: She is oriented to person, place, and time. She appears well-developed and well-nourished.   HENT:   Head: Normocephalic and atraumatic.   Eyes: Pupils are equal, round, and reactive to light. EOM are normal.   Neck: Normal range of motion. Neck supple.   Cardiovascular: Normal rate, regular rhythm and normal heart sounds.   Pulmonary/Chest: Effort normal and breath sounds normal.   Abdominal: Soft. Bowel sounds are normal.   Musculoskeletal: Normal range of motion.   Tenderness to palpation in the projection of left hip joint    Neurological: She is alert and oriented to person, place, and time.   Skin: Skin is warm and dry.   Psychiatric: She has a normal mood and affect. Cognition and memory are impaired.   Nursing note and vitals reviewed.      Fluids    Intake/Output Summary (Last 24 hours) at 8/2/2019 1455  Last data filed at 8/2/2019 1000  Gross per 24 hour   Intake 240 ml   Output --   Net 240 ml       Laboratory  Recent Labs     07/31/19  0350   WBC 3.0*   RBC 3.93*   HEMOGLOBIN 11.9*   HEMATOCRIT 36.9*   MCV 93.9   MCH 30.3   MCHC 32.2*   RDW 52.1*   PLATELETCT 210   MPV 9.5                       Imaging  IR-INJ-EXT PSEUDOANEURYSM PERC   Final Result      1.  Ultrasound guided thrombin injection for treatment of a RIGHT gluteal 3.6 cm pseudoaneurysm.      US-EXTREMITY ARTERY LOWER UNILAT RIGHT   Final Result      Right gluteal pseudoaneurysm measuring 3.65 x 2.24 x 2.31 cm.      CTA ABDOMEN PELVIS W & W/O POST PROCESS   Final Result      1.  Interval enlargement of a hyperdense ovoid masslike structure immediately posterior to the right ilium, possibly representing a pseudoaneurysm.   2.  Stable thin-walled apparent fluid-filled structure immediately deep to the right ilium.   3.   Minimal retroperitoneal adenopathy, grossly stable.   4.  Cholelithiasis.      MR-BRAIN-WITH & W/O   Final Result      1.  Innumerable nodular enhancing lesions throughout the brain  parenchyma both the supra and infratentorial compartments predominantly near the gray-white junction. There has been interval increase in size of several of these lesions since previous exam.    These changes may be secondary to metastatic disease or granulomatous disease.      2.  Interval right parietal craniotomy with underlying elliptical postsurgical defect.      3.  Interval increase in size of index right thalamic lesion which has increased vasogenic edema since previous exam.      4.  Age-related cerebral atrophy.      IR-PICC LINE PLACEMENT W/ GUIDANCE > AGE 5   Final Result                  Ultrasound-guided PICC placement performed by qualified nursing staff as    above.          AF-EIGACAE-2 VIEW   Final Result         1.  Moderate stool in the colon suggests changes of constipation, otherwise nonspecific bowel gas pattern   2.  Atherosclerosis      CT-NEEDLE BX-ABD-RETROPERITONL   Final Result      1.  CT GUIDED biopsy of a right pelvic phlegmon.      2. Significant interval enlargement of a right gluteal pseudoaneurysm. CTA and consideration for possible intervention either with direct thrombin injection or endovascular treatment was suggested. This was conveyed to Dr. Alatorre on 7/11/2019 via [place]    text at 1750 hours.      US-EXTREMITY VENOUS LOWER BILAT   Final Result      IR-US GUIDED PIV   Final Result    Ultrasound-guided PERIPHERAL IV INSERTION performed by    qualified nursing staff as above.            MR-LUMBAR SPINE-WITH & W/O   Final Result         1.  Grade 2-3 spondylolisthesis at the L5-S1 level with bilateral spondylolysis of the pars interarticularis. This causes severe bilateral neural foraminal stenosis at this level.      2.  Interval removal of posterior fusion hardware at the lumbosacral junction.      3.  Diffuse osteomyelitis involving the L5 vertebral body and the first 3 sacral segments.      4.  Chronic discitis at the L5-S1 level with anterior paraspinous abscess at this  level and anterior to the S1 sacral segment.      5.  Smaller intraosseous bone bone abscesses or areas of necrosis noted in the sacrum.      6.  There is also evidence of osteomyelitis involving the jose antonio and sacrum bilaterally along the course of the previous sacral fixation screw. There is a large 4 cm abscess noted in the right gluteal soft tissues in a smaller 3 cm chronic appearing    abscess in the left gluteal musculature.      7.  There is a 1.5 cm intraosseous abscess or area of necrosis in the right posterior ilium.      8.  There is a 3.3 cm centimeters multiloculated abscess anterior to the right sacroiliac joint.      MR-STEALTH BRAIN WITH & W/O   Final Result         1.  Innumerable subcentimeter brain metastases, many at the gray-white junction but also multiple noted throughout the basal ganglia and brainstem.      2.  Largest index lesion is noted in the right thalamus and has increased in size since previous exam and currently measures 9 mm and has a moderate amount of surrounding vasogenic edema.      US-EXTREMITY NON VASCULAR UNILATERAL RIGHT   Final Result      No right hip joint effusion. No soft tissue fluid collection.      MR-BRAIN-WITH & W/O   Final Result      1.  Innumerable supra and infratentorial enhancing nodules are again noted throughout the brain parenchyma with multiple lesions appearing somewhat larger and with increased enhancement. No associated diffusion restriction. Unchanged differential    considerations including bacterial or fungal infection versus metastatic disease.   2.  Mild diffuse cerebral substance loss.   3.  Mild microangiopathic ischemic change versus demyelination or gliosis.      CT-NEEDLE BX-DEEP BONE   Final Result      CT GUIDED RIGHT ILIUM DEEP BONE BIOPSY. SAMPLES WERE SENT TO [facility] FOR ANALYSIS.      CT-PELVIS WITH   Final Result         1.  No significant interval change in the size of the RIGHT iliac muscle fluid collections   2.  Hyperdense  RIGHT gluteal lesion moderately suspicious for pseudoaneurysm with adjacent hematoma.   3.  Changes in the RIGHT iliac bone, BILATERAL sacrum and LEFT iliac bone suspicious for osteomyelitis   4.  Changes at L5-S1 suspicious for discitis osteomyelitis      CT-PELVIS WITH   Final Result      1.  Residual or recurrent abscess within the right iliacus muscle measuring 2.5 x 1.8 cm. It slightly smaller than on 5/28/2019      2.  Interval removal of hardware from the iliac bones and sacrum      3.  Lytic change of the right iliac bone and the sacrum. This could be related to hardware versus osteomyelitis.      4.  Subacute fractures of the right ilium, sacrum, and there is lucency within the left iliac bone that is likely from hardware      MR-BRAIN-WITH & W/O   Final Result      1.  Interval progression of supra and infratentorial intra-axial nodules and associated edema. This short-term interval progression favors infection over neoplasm though both remain considerations.   2.  Mild atrophy      DX-PORTABLE FLUOROSCOPY < 1 HOUR   Final Result         Portable fluoroscopy utilized for 2 minutes.      DX-PELVIS-1 OR 2 VIEWS   Final Result      Status post hardware removal from the right SI joint      DX-CHEST-PORTABLE (1 VIEW)   Final Result      Interstitial prominence could be due to pulmonary edema or hypoventilatory change.      DX-CHEST-LIMITED (1 VIEW)   Final Result      LEFT basilar underinflation atelectasis or pneumonia      CT-DRAIN-HEMATOMA - SEROMA   Final Result      CT-guided RIGHT pelvic fluid collection aspiration, laboratory evaluation pending         DX-HIP-UNILATERAL-WITH PELVIS-1 VIEW LEFT    (Results Pending)        Assessment/Plan  * Brain lesion- (present on admission)  Assessment & Plan  Right-sided craniotomy for resection of superficial mass  6/28/2019  Continue Isoniazid, Rifampin, Ethambutol, Pyrazinamide per ID, discussed with ID they have reached out to [facility] to discuss outpatient  regimen  Continue Keppra for seizure prophylaxis, per ID consider restarting steroids if she has breakthrough seizures  No seizures observed    Hypokalemia- (present on admission)  Assessment & Plan  Replacing  following    Osteomyelitis (HCC)- (present on admission)  Assessment & Plan  Not osteo- actually disseminated TB  ID following    Granulomatous disease - (present on admission)  Assessment & Plan  Isoniazid, Rifampin, Ethambutol, Pyrazinamide    Pseudoaneurysm following procedure (HCC)- (present on admission)  Assessment & Plan    POD #5 s/p IR thrombin injection for thrombosed psedudoanerysm    Non-severe protein-calorie malnutrition (HCC)- (present on admission)  Assessment & Plan  Nutrition consult      Sepsis (HCC)- (present on admission)  Assessment & Plan  This is sepsis (without associated acute organ dysfunction).    Source - Right Iliac and Gluteus medius abscess  Resolved    Hypomagnesemia- (present on admission)  Assessment & Plan  Oral magnesium    Abscess of right iliac muscle and right gluteus medius muscle- (present on admission)  Assessment & Plan  CT guided pelvic aspiration 5/30/2019  Hardware removal, pelvis 6/5/2019  CT guided deep bone biopsy 6/19/2019  CT guided aspiration/biopsy of a R gluteal fluid collection/phlegmon 7/11/2019 - consistent with TB  Continue Isoniazid, Rifampin, Ethambutol, Pyrazinamide  Associated pain improving  Levaquin to continue for 9-12 months -- recommended by [facility] and ID      Dysphagia- (present on admission)  Assessment & Plan  Dysphagia 3  Speech following    History of DVT (deep vein thrombosis)- (present on admission)  Assessment & Plan  Xarelto was held due to hip hematoma and rifampin  Consider starting coumadin in 1-2 weeks if h/h remains stable    Essential hypertension- (present on admission)  Assessment & Plan  Continue Metoprolol, Losartan and Amlodipine as tolerated  Blood pressure stable    RLS (restless legs syndrome)- (present on  admission)  Assessment & Plan  Pramipexole    Chronic pain- (present on admission)  Assessment & Plan    Opiates were causing confusion   Has now been tapered off long acting opiates and doing well  8/2 complaining of worsening of left-sided hip/pelvic pain.  x-ray of left femur/pelvis pending  Tylenol scheduled started    History of breast cancer- (present on admission)  Assessment & Plan  history left mastectomy and breast implant.      COPD (chronic obstructive pulmonary disease) (HCC)- (present on admission)  Assessment & Plan  Continue RT protocol  No s/o exacerbation       VTE prophylaxis: Heparin

## 2019-08-03 PROCEDURE — A9270 NON-COVERED ITEM OR SERVICE: HCPCS | Performed by: INTERNAL MEDICINE

## 2019-08-03 PROCEDURE — 700102 HCHG RX REV CODE 250 W/ 637 OVERRIDE(OP): Performed by: INTERNAL MEDICINE

## 2019-08-03 PROCEDURE — A9270 NON-COVERED ITEM OR SERVICE: HCPCS | Performed by: FAMILY MEDICINE

## 2019-08-03 PROCEDURE — 700102 HCHG RX REV CODE 250 W/ 637 OVERRIDE(OP): Performed by: FAMILY MEDICINE

## 2019-08-03 PROCEDURE — 700111 HCHG RX REV CODE 636 W/ 250 OVERRIDE (IP): Performed by: INTERNAL MEDICINE

## 2019-08-03 PROCEDURE — 99231 SBSQ HOSP IP/OBS SF/LOW 25: CPT | Performed by: INTERNAL MEDICINE

## 2019-08-03 PROCEDURE — 770001 HCHG ROOM/CARE - MED/SURG/GYN PRIV*

## 2019-08-03 PROCEDURE — 700101 HCHG RX REV CODE 250: Performed by: FAMILY MEDICINE

## 2019-08-03 PROCEDURE — 99232 SBSQ HOSP IP/OBS MODERATE 35: CPT | Performed by: INTERNAL MEDICINE

## 2019-08-03 RX ADMIN — POTASSIUM CHLORIDE 20 MEQ: 20 TABLET, EXTENDED RELEASE ORAL at 05:00

## 2019-08-03 RX ADMIN — LIDOCAINE 2 PATCH: 50 PATCH CUTANEOUS at 11:48

## 2019-08-03 RX ADMIN — PRAMIPEXOLE DIHYDROCHLORIDE 0.25 MG: 0.5 TABLET ORAL at 20:17

## 2019-08-03 RX ADMIN — ACETAMINOPHEN 1000 MG: 500 TABLET ORAL at 17:06

## 2019-08-03 RX ADMIN — LOSARTAN POTASSIUM 75 MG: 50 TABLET ORAL at 05:00

## 2019-08-03 RX ADMIN — CINACALCET HYDROCHLORIDE 60 MG: 30 TABLET, COATED ORAL at 05:03

## 2019-08-03 RX ADMIN — HEPARIN SODIUM 5000 UNITS: 5000 INJECTION, SOLUTION INTRAVENOUS; SUBCUTANEOUS at 17:06

## 2019-08-03 RX ADMIN — ATORVASTATIN CALCIUM 10 MG: 10 TABLET, FILM COATED ORAL at 17:06

## 2019-08-03 RX ADMIN — OXYCODONE HYDROCHLORIDE 5 MG: 5 TABLET ORAL at 15:31

## 2019-08-03 RX ADMIN — METOPROLOL TARTRATE 25 MG: 25 TABLET, FILM COATED ORAL at 17:11

## 2019-08-03 RX ADMIN — GABAPENTIN 400 MG: 400 CAPSULE ORAL at 11:48

## 2019-08-03 RX ADMIN — MAGNESIUM 64 MG (MAGNESIUM CHLORIDE) TABLET,DELAYED RELEASE 64 MG: at 05:00

## 2019-08-03 RX ADMIN — ISONIAZID 300 MG: 300 TABLET ORAL at 05:01

## 2019-08-03 RX ADMIN — LIDOCAINE HYDROCHLORIDE 15 ML: 20 SOLUTION OROPHARYNGEAL at 07:56

## 2019-08-03 RX ADMIN — OMEPRAZOLE 20 MG: 20 CAPSULE, DELAYED RELEASE ORAL at 05:00

## 2019-08-03 RX ADMIN — ACETAMINOPHEN 1000 MG: 500 TABLET ORAL at 05:00

## 2019-08-03 RX ADMIN — PRAMIPEXOLE DIHYDROCHLORIDE 0.25 MG: 0.5 TABLET ORAL at 07:56

## 2019-08-03 RX ADMIN — METOPROLOL TARTRATE 25 MG: 25 TABLET, FILM COATED ORAL at 05:01

## 2019-08-03 RX ADMIN — LEVETIRACETAM 500 MG: 500 TABLET, FILM COATED ORAL at 17:06

## 2019-08-03 RX ADMIN — HEPARIN SODIUM 5000 UNITS: 5000 INJECTION, SOLUTION INTRAVENOUS; SUBCUTANEOUS at 05:01

## 2019-08-03 RX ADMIN — PYRIDOXINE HCL TAB 50 MG 100 MG: 50 TAB at 05:00

## 2019-08-03 RX ADMIN — PRAMIPEXOLE DIHYDROCHLORIDE 0.25 MG: 0.5 TABLET ORAL at 15:31

## 2019-08-03 RX ADMIN — FERROUS SULFATE TAB 325 MG (65 MG ELEMENTAL FE) 325 MG: 325 (65 FE) TAB at 07:56

## 2019-08-03 RX ADMIN — SENNOSIDES, DOCUSATE SODIUM 2 TABLET: 50; 8.6 TABLET, FILM COATED ORAL at 17:05

## 2019-08-03 RX ADMIN — LEVOFLOXACIN 750 MG: 750 TABLET, FILM COATED ORAL at 05:00

## 2019-08-03 RX ADMIN — GABAPENTIN 400 MG: 400 CAPSULE ORAL at 05:00

## 2019-08-03 RX ADMIN — TEMAZEPAM 15 MG: 15 CAPSULE ORAL at 20:17

## 2019-08-03 RX ADMIN — AMLODIPINE BESYLATE 10 MG: 5 TABLET ORAL at 05:01

## 2019-08-03 RX ADMIN — ETHAMBUTOL HYDROCHLORIDE 800 MG: 400 TABLET, FILM COATED ORAL at 05:01

## 2019-08-03 RX ADMIN — PYRAZINAMIDE 1000 MG: 500 TABLET ORAL at 05:01

## 2019-08-03 RX ADMIN — MAGNESIUM 64 MG (MAGNESIUM CHLORIDE) TABLET,DELAYED RELEASE 64 MG: at 17:11

## 2019-08-03 RX ADMIN — LEVETIRACETAM 500 MG: 500 TABLET, FILM COATED ORAL at 05:01

## 2019-08-03 RX ADMIN — Medication 1 CAPSULE: at 07:56

## 2019-08-03 RX ADMIN — RIFAMPIN 600 MG: 300 CAPSULE ORAL at 05:00

## 2019-08-03 RX ADMIN — GABAPENTIN 400 MG: 400 CAPSULE ORAL at 17:06

## 2019-08-03 RX ADMIN — SENNOSIDES, DOCUSATE SODIUM 2 TABLET: 50; 8.6 TABLET, FILM COATED ORAL at 05:01

## 2019-08-03 RX ADMIN — ACETAMINOPHEN 1000 MG: 500 TABLET ORAL at 11:48

## 2019-08-03 RX ADMIN — OXYCODONE HYDROCHLORIDE 5 MG: 5 TABLET ORAL at 08:00

## 2019-08-03 ASSESSMENT — ENCOUNTER SYMPTOMS
NECK PAIN: 0
SPUTUM PRODUCTION: 0
HEARTBURN: 0
WEIGHT LOSS: 0
NERVOUS/ANXIOUS: 0
SHORTNESS OF BREATH: 0
HEMOPTYSIS: 0
FOCAL WEAKNESS: 0
BRUISES/BLEEDS EASILY: 0
PHOTOPHOBIA: 0
DIZZINESS: 0
PALPITATIONS: 0
BLURRED VISION: 0
SPEECH CHANGE: 0
BACK PAIN: 0
TREMORS: 0
DOUBLE VISION: 0
NAUSEA: 0
POLYDIPSIA: 0
FEVER: 0
VOMITING: 0
ORTHOPNEA: 0
COUGH: 0
CHILLS: 0
HALLUCINATIONS: 0
HEADACHES: 0
FLANK PAIN: 0

## 2019-08-03 ASSESSMENT — LIFESTYLE VARIABLES: SUBSTANCE_ABUSE: 0

## 2019-08-03 NOTE — CARE PLAN
Patient will remain free from falls throughout the shift    Plan of care reviewed with the patient and she verbalizes understanding

## 2019-08-03 NOTE — PROGRESS NOTES
[facility] Medicine Daily Progress Note    Date of Service  8/3/2019    Chief Complaint  68 y.o. female admitted 5/29/2019 with right iliac and gluteus abscess    Hospital Course  Patient is a 68 year old with history of breast cancer, copd, dm, gerd, dl and htn who was admitted with right iliac gluteus abscess.  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    Interval Problem Update  8/2  Patient is complaining of worsening of pain in the hip on the left side, especially when she is sitting on the toilet.  I will order left hip and pelvic x-ray  I started her on scheduled Tylenol  8/3  Patient in no distress.  Denies any change in the left buttock pain, which she feels only when she is sitting on a toilet  Vital stable  Consultants/Specialty  ID  Neurosurgery  Ortho    Code Status  Full    Disposition  snf pending, would like to go to snf near family in [place]      Review of Systems  Review of Systems   Constitutional: Negative for chills, fever and weight loss.   HENT: Negative for ear pain, hearing loss and tinnitus.    Eyes: Negative for blurred vision, double vision and photophobia.   Respiratory: Negative for cough, hemoptysis and sputum production.    Cardiovascular: Negative for chest pain, palpitations and orthopnea.   Gastrointestinal: Negative for heartburn, nausea and vomiting.   Genitourinary: Negative for dysuria, flank pain, frequency and hematuria.   Musculoskeletal: Positive for joint pain. Negative for back pain and neck pain.   Skin: Negative for itching and rash.   Neurological: Negative for tremors, speech change, focal weakness and headaches.   Endo/Heme/Allergies: Negative for environmental allergies and polydipsia. Does not bruise/bleed easily.   Psychiatric/Behavioral: Negative for hallucinations and substance abuse. The patient is not nervous/anxious.         Physical Exam  Temp:   [36.2 °C (97.2 °F)-36.8 °C (98.3 °F)] 36.7 °C (98.1 °F)  Pulse:  [69-78] 73  Resp:  [16-18] 18  BP: (110-127)/(54-71) 127/65  SpO2:  [95 %-100 %] 97 %    Physical Exam   Constitutional: She is oriented to person, place, and time. She appears well-developed and well-nourished.   HENT:   Head: Normocephalic and atraumatic.   Eyes: Pupils are equal, round, and reactive to light. EOM are normal.   Neck: Normal range of motion. Neck supple.   Cardiovascular: Normal rate, regular rhythm and normal heart sounds.   Pulmonary/Chest: Effort normal and breath sounds normal.   Abdominal: Soft. Bowel sounds are normal.   Musculoskeletal: Normal range of motion.   Tenderness to palpation  Of the left buttock   Neurological: She is alert and oriented to person, place, and time.   Skin: Skin is warm and dry.   Psychiatric: She has a normal mood and affect. Cognition and memory are impaired.   Nursing note and vitals reviewed.    Examined the patient on 8/3.  No significant changes from 8/2 except as documented  Fluids    Intake/Output Summary (Last 24 hours) at 8/3/2019 1332  Last data filed at 8/2/2019 2000  Gross per 24 hour   Intake 260 ml   Output --   Net 260 ml       Laboratory                        Imaging  DX-HIP-UNILATERAL-WITH PELVIS-1 VIEW LEFT   Final Result      No LEFT hip or pelvic fracture.      IR-INJ-EXT PSEUDOANEURYSM PERC   Final Result      1.  Ultrasound guided thrombin injection for treatment of a RIGHT gluteal 3.6 cm pseudoaneurysm.      US-EXTREMITY ARTERY LOWER UNILAT RIGHT   Final Result      Right gluteal pseudoaneurysm measuring 3.65 x 2.24 x 2.31 cm.      CTA ABDOMEN PELVIS W & W/O POST PROCESS   Final Result      1.  Interval enlargement of a hyperdense ovoid masslike structure immediately posterior to the right ilium, possibly representing a pseudoaneurysm.   2.  Stable thin-walled apparent fluid-filled structure immediately deep to the right ilium.   3.   Minimal retroperitoneal adenopathy,  grossly stable.   4.  Cholelithiasis.      MR-BRAIN-WITH & W/O   Final Result      1.  Innumerable nodular enhancing lesions throughout the brain parenchyma both the supra and infratentorial compartments predominantly near the gray-white junction. There has been interval increase in size of several of these lesions since previous exam.    These changes may be secondary to metastatic disease or granulomatous disease.      2.  Interval right parietal craniotomy with underlying elliptical postsurgical defect.      3.  Interval increase in size of index right thalamic lesion which has increased vasogenic edema since previous exam.      4.  Age-related cerebral atrophy.      IR-PICC LINE PLACEMENT W/ GUIDANCE > AGE 5   Final Result                  Ultrasound-guided PICC placement performed by qualified nursing staff as    above.          [id number] VIEW   Final Result         1.  Moderate stool in the colon suggests changes of constipation, otherwise nonspecific bowel gas pattern   2.  Atherosclerosis      CT-NEEDLE BX-ABD-RETROPERITONL   Final Result      1.  CT GUIDED biopsy of a right pelvic phlegmon.      2. Significant interval enlargement of a right gluteal pseudoaneurysm. CTA and consideration for possible intervention either with direct thrombin injection or endovascular treatment was suggested. This was conveyed to Dr. Alatorre on 7/11/2019 via [facility]    text at 1750 hours.      US-EXTREMITY VENOUS LOWER BILAT   Final Result      IR-US GUIDED PIV   Final Result    Ultrasound-guided PERIPHERAL IV INSERTION performed by    qualified nursing staff as above.            MR-LUMBAR SPINE-WITH & W/O   Final Result         1.  Grade 2-3 spondylolisthesis at the L5-S1 level with bilateral spondylolysis of the pars interarticularis. This causes severe bilateral neural foraminal stenosis at this level.      2.  Interval removal of posterior fusion hardware at the lumbosacral junction.      3.  Diffuse osteomyelitis involving  the L5 vertebral body and the first 3 sacral segments.      4.  Chronic discitis at the L5-S1 level with anterior paraspinous abscess at this level and anterior to the S1 sacral segment.      5.  Smaller intraosseous bone bone abscesses or areas of necrosis noted in the sacrum.      6.  There is also evidence of osteomyelitis involving the jose antonio and sacrum bilaterally along the course of the previous sacral fixation screw. There is a large 4 cm abscess noted in the right gluteal soft tissues in a smaller 3 cm chronic appearing    abscess in the left gluteal musculature.      7.  There is a 1.5 cm intraosseous abscess or area of necrosis in the right posterior ilium.      8.  There is a 3.3 cm centimeters multiloculated abscess anterior to the right sacroiliac joint.      MR-STEALTH BRAIN WITH & W/O   Final Result         1.  Innumerable subcentimeter brain metastases, many at the gray-white junction but also multiple noted throughout the basal ganglia and brainstem.      2.  Largest index lesion is noted in the right thalamus and has increased in size since previous exam and currently measures 9 mm and has a moderate amount of surrounding vasogenic edema.      US-EXTREMITY NON VASCULAR UNILATERAL RIGHT   Final Result      No right hip joint effusion. No soft tissue fluid collection.      MR-BRAIN-WITH & W/O   Final Result      1.  Innumerable supra and infratentorial enhancing nodules are again noted throughout the brain parenchyma with multiple lesions appearing somewhat larger and with increased enhancement. No associated diffusion restriction. Unchanged differential    considerations including bacterial or fungal infection versus metastatic disease.   2.  Mild diffuse cerebral substance loss.   3.  Mild microangiopathic ischemic change versus demyelination or gliosis.      CT-NEEDLE BX-DEEP BONE   Final Result      CT GUIDED RIGHT ILIUM DEEP BONE BIOPSY. SAMPLES WERE SENT TO MICROBIOLOGY FOR ANALYSIS.       CT-PELVIS WITH   Final Result         1.  No significant interval change in the size of the RIGHT iliac muscle fluid collections   2.  Hyperdense RIGHT gluteal lesion moderately suspicious for pseudoaneurysm with adjacent hematoma.   3.  Changes in the RIGHT iliac bone, BILATERAL sacrum and LEFT iliac bone suspicious for osteomyelitis   4.  Changes at L5-S1 suspicious for discitis osteomyelitis      CT-PELVIS WITH   Final Result      1.  Residual or recurrent abscess within the right iliacus muscle measuring 2.5 x 1.8 cm. It slightly smaller than on 5/28/2019      2.  Interval removal of hardware from the iliac bones and sacrum      3.  Lytic change of the right iliac bone and the sacrum. This could be related to hardware versus osteomyelitis.      4.  Subacute fractures of the right ilium, sacrum, and there is lucency within the left iliac bone that is likely from hardware      MR-BRAIN-WITH & W/O   Final Result      1.  Interval progression of supra and infratentorial intra-axial nodules and associated edema. This short-term interval progression favors infection over neoplasm though both remain considerations.   2.  Mild atrophy      DX-PORTABLE FLUOROSCOPY < 1 HOUR   Final Result         Portable fluoroscopy utilized for 2 minutes.      DX-PELVIS-1 OR 2 VIEWS   Final Result      Status post hardware removal from the right SI joint      DX-CHEST-PORTABLE (1 VIEW)   Final Result      Interstitial prominence could be due to pulmonary edema or hypoventilatory change.      DX-CHEST-LIMITED (1 VIEW)   Final Result      LEFT basilar underinflation atelectasis or pneumonia      CT-DRAIN-HEMATOMA - SEROMA   Final Result      CT-guided RIGHT pelvic fluid collection aspiration, laboratory evaluation pending              Assessment/Plan  * Brain lesion- (present on admission)  Assessment & Plan  Right-sided craniotomy for resection of superficial mass  6/28/2019  Continue Isoniazid, Rifampin, Ethambutol, Pyrazinamide per  ID, discussed with ID they have reached out to [facility] to discuss outpatient regimen  Continue Keppra for seizure prophylaxis, per ID consider restarting steroids if she has breakthrough seizures  No seizures observed    Hypokalemia- (present on admission)  Assessment & Plan  Replacing  following    Osteomyelitis (HCC)- (present on admission)  Assessment & Plan  Not osteo- actually disseminated TB  ID following    Granulomatous disease - (present on admission)  Assessment & Plan  Isoniazid, Rifampin, Ethambutol, Pyrazinamide    Pseudoaneurysm following procedure (HCC)- (present on admission)  Assessment & Plan    POD #5 s/p IR thrombin injection for thrombosed psedudoanerysm    Non-severe protein-calorie malnutrition (HCC)- (present on admission)  Assessment & Plan  Nutrition consult      Sepsis ([facility])- (present on admission)  Assessment & Plan  This is sepsis (without associated acute organ dysfunction).    Source - Right Iliac and Gluteus medius abscess  Resolved    Hypomagnesemia- (present on admission)  Assessment & Plan  Oral magnesium    Abscess of right iliac muscle and right gluteus medius muscle- (present on admission)  Assessment & Plan  CT guided pelvic aspiration 5/30/2019  Hardware removal, pelvis 6/5/2019  CT guided deep bone biopsy 6/19/2019  CT guided aspiration/biopsy of a R gluteal fluid collection/phlegmon 7/11/2019 - consistent with TB  Continue Isoniazid, Rifampin, Ethambutol, Pyrazinamide  Associated pain improving  Levaquin to continue for 9-12 months -- recommended by [facility] and ID      Dysphagia- (present on admission)  Assessment & Plan  Dysphagia 3  Speech following    History of DVT (deep vein thrombosis)- (present on admission)  Assessment & Plan  Xarelto was held due to hip hematoma and rifampin  Consider starting coumadin in 1-2 weeks if h/h remains stable    Essential hypertension- (present on admission)  Assessment & Plan  Continue Metoprolol, Losartan and Amlodipine as  tolerated  Blood pressure stable    RLS (restless legs syndrome)- (present on admission)  Assessment & Plan  Pramipexole    Chronic pain- (present on admission)  Assessment & Plan    Opiates were causing confusion   Has now been tapered off long acting opiates and doing well  8/2 complaining of worsening of left-sided hip/pelvic pain.  x-ray of left femur/pelvis pending  Tylenol scheduled started    History of breast cancer- (present on admission)  Assessment & Plan  history left mastectomy and breast implant.      COPD (chronic obstructive pulmonary disease) (HCC)- (present on admission)  Assessment & Plan  Continue RT protocol  No s/o exacerbation       VTE prophylaxis: Heparin

## 2019-08-03 NOTE — PROGRESS NOTES
Assumed pt care at 1900. Received bedside report. Pt A&O x 3. Pt denies numbness and tingling. Pt is continent. Ambulates with 1x assist and FWW. Patient tolerating regular diet. Plan of care discussed. Fall precautions in place. Q4 hour neuro checks and hourly rounding in place.

## 2019-08-03 NOTE — PROGRESS NOTES
Infectious Disease Progress Note    Author: Karen Garcia M.D. Date & Time of service: 8/3/2019  1:51 PM    Chief Complaint:  Brain abscess, gluteal abscess  Vertebral OM     Interval History:  68 y.o. female admitted 5/29/2019 as a transfer from [facility] since 4/30/2019. + diabetes, SANTOSH of right hip with hardware, and breast cancer who was originally admitted to [facility] on 03/08/2019 for right hip and pelvic pain.  Work-up revealed a right iliopsoas lesion, gluteal abscess, and mult brain abscesses     7/4 AF much more alert and conversant today-c/o left hip pain, unrelenting and would like parietal dressing changed. HA at surgical site.  Denies SE abx. No new neurodeficits  7/5 afebrile WBC 5.4.  Patient sleeping but arousable.  She denies any headache, nausea, vomiting.  7/6 afebrile WBC 6.5.  Patient sitting up in chair and having excruciating back pain  7/8 Pt has no specific complaints, she is mildly confused today.  She seems to be tolerating her medications with no significant lab abnormalities.  7/9 AF, O2 RA, Significant left hip pain today.  She does appear to be tolerating her antibiotics.  Ultrasound of bilateral lower extremities has been ordered.  7/10 AF, O2 RA,  Plan for IR drainage of sacral collection soon.   7/11 AF, O2 RA,  Pt continued on RIPE and ampho.  She is tolerating well overall, requiring some mag and potassium replacement.  She is complaining of severe pain in left hip again today.  Plan for biopsy later today.   7/12  IR biopsy of R gluteal abscess/hip. AF, O2 2 L NC,   tolerating antibiotics so far.  She is quite somnolent today but per nursing she was oriented x4 earlier  7/14 AF, O2 RA, more alert today, conversant and asking for advance in diet. Left hip pain ongoing but improved.   7/15 afebrile WBC 4.4.  Patient's speech is somewhat muffled but she is alert and responding to questions appropriately.  MRI shows worsening lesions and amphotericin stopped.  Nurse reports  waxing and waning mental status  7/16 afebrile.  Patient sleeping but arousable.  She is answer questions appropriately but then forgets that she is in the hospital.  7/17 afebrile WBC 4.9 patient is very drowsy as she states she did not sleep well yesterday.  She is asking about whether or not she has a fungal infection.  Plan of care discussed with patient.  7/19 afebrile patient continues to only endorse left hip pain exacerbated by sitting up in the chair.  She sat in the chair for 30 minutes yesterday.  Getting out of bed is limited secondary to left hip pain.  Mental status remains about the same with intermittent waxing and waning.  No new issues today per RN  7/21 afebrile patient is much more awake and alert today.  She is answering questions appropriately.  Yesterday she was complaining of some abdominal pain so CT scan was done and revealed interval enlargement of a hyperdense ovoid masslike structure posterior to the right ilium concerning for pseudoaneurysm.  7/22/2019 no fevers.  No new issues overnight.  Had her ultrasound today follow the results  7/23/2019-no fevers.  Ongoing pain.  The AFB cultures have come back positive for TB  7/24/2019 no fevers.  No new issues overnight.  Very anxious and agitated  7/25/2019 no fevers.  No new issues.  Says she feels slightly better.  7/26 AF WBC 4.5 tolerating meds well-wants cream to decrease size of surgical scar-has right-sided rib pain and joint pain  7/27 AF ambulating with assist with walker-no new complaints  7/28 AF same pain complaint-remains debilitated with intermittent confusion  7/29 AF WBC 4.2 no clinical change  7/30 AF WBC 3.3 sleeping sounding-dw RN has been complaining of hip pain but able to ambulate  7/31 AF WBC 3 ambulating in peng with rolling walker-NAD. No new complaints  8/1 AF knitting in bed-looks comfortable but requesting more pain meds  8/2 AF No CBC no new complaints  8/3 AF tolerating PO-no rash Same pain    Review of  Systems:  Review of Systems   Constitutional: Negative for chills and fever.   Respiratory: Negative for cough and shortness of breath.    Cardiovascular: Negative for chest pain.   Gastrointestinal: Negative for nausea.   Genitourinary: Negative for dysuria.   Musculoskeletal: Positive for joint pain.   Neurological: Negative for dizziness.   All other systems reviewed and are negative.      Hemodynamics:  Temp (24hrs), Av.6 °C (97.9 °F), Min:36.2 °C (97.2 °F), Max:36.8 °C (98.3 °F)  Temperature: 36.7 °C (98.1 °F)  Pulse  Av.7  Min: 49  Max: 195   Blood Pressure : 127/65       Physical Exam:  Physical Exam   Constitutional: She is oriented to person, place, and time. No distress.   Elderly  Chronically ill-appearing   HENT:   Mouth/Throat: Oropharynx is clear and moist. No oropharyngeal exudate.   Right parietal surgical site healed   Eyes: Right eye exhibits no discharge. Left eye exhibits no discharge.   Neck: Neck supple. No JVD present.   Cardiovascular: Normal rate and regular rhythm.   Pulmonary/Chest: Effort normal. No stridor. She has no wheezes. She has no rales.   Abdominal: Soft. There is no rebound and no guarding.   Musculoskeletal: She exhibits no edema.   Neurological: She is alert and oriented to person, place, and time. Coordination normal.   Skin: Skin is warm. No rash noted. She is not diaphoretic. No erythema.   Nursing note and vitals reviewed.      Meds:    Current Facility-Administered Medications:   •  oxyCODONE immediate-release  •  acetaminophen  •  magnesium chloride  •  levoFLOXacin  •  hyoscyamine-maalox plus-lidocaine viscous  •  riFAMPin  •  thrombin  •  temazepam  •  potassium chloride SA  •  losartan  •  pramipexole  •  senna-docusate  •  gabapentin  •  ferrous sulfate  •  diphenhydrAMINE  •  heparin  •  pyridoxine  •  levETIRAcetam  •  isoniazid  •  ethambutol  •  pyrazinamide  •  Pharmacy Consult Request  •  labetalol  •  lactobacillus rhamnosus  •  lidocaine  •   cyclobenzaprine  •  Respiratory Care per Protocol  •  amLODIPine  •  cinacalcet  •  atorvastatin  •  omeprazole  •  metoprolol  •  ondansetron  •  ondansetron    Labs:  No results for input(s): WBC, RBC, HEMOGLOBIN, HEMATOCRIT, MCV, MCH, RDW, PLATELETCT, MPV, NEUTSPOLYS, LYMPHOCYTES, MONOCYTES, EOSINOPHILS, BASOPHILS, RBCMORPHOLO in the last 72 hours.  No results for input(s): SODIUM, POTASSIUM, CHLORIDE, CO2, GLUCOSE, BUN, CPKTOTAL in the last 72 hours.  No results for input(s): ALBUMIN, TBILIRUBIN, ALKPHOSPHAT, TOTPROTEIN, ALTSGPT, ASTSGOT, CREATININE in the last 72 hours.    Imaging:  MRI on 7/14/2019  Impression:       1.  Innumerable nodular enhancing lesions throughout the brain parenchyma both the supra and infratentorial compartments predominantly near the gray-white junction. There has been interval increase in size of several of these lesions since previous exam.   These changes may be secondary to metastatic disease or granulomatous disease.    2.  Interval right parietal craniotomy with underlying elliptical postsurgical defect.    3.  Interval increase in size of index right thalamic lesion which has increased vasogenic edema since previous exam.    4.  Age-related cerebral atrophy.       Micro:  Results     Procedure Component Value Units Date/Time    Fungal Culture [[id number]] Collected:  07/11/19 1745    Order Status:  Completed Specimen:  Tissue Updated:  08/02/19 1104     Significant Indicator NEG     Source TISS     Site Right Hip Cores     Culture Result No fungal growth to date.    Narrative:       CALL  Mckeon  TEREZA tel. [phone],  CALLED  TEREZA tel. [phone] 08/02/2019, 11:03, RB PERF. RESULTS CALLED  TO:[id number].    AFB Culture [[id number]]  (Abnormal) Collected:  07/11/19 1745    Order Status:  Completed Specimen:  Tissue Updated:  08/02/19 1104     Significant Indicator POS     Source TISS     Site Right Hip Cores     Culture Result Culture in progress.  Acid fast bacilli detected by MGIT 960  "instrument.       AFB Smear Results No acid fast bacilli seen.    Narrative:       CALL  Mckeon  TEREZA tel. [phone],  CALLED  TEREZA tel. [phone] 08/02/2019, 11:03, RB PERF. RESULTS CALLED  TO:[phone].          Assessment:  Active Hospital Problems    Diagnosis   • *Brain lesion [G93.9]   • Granulomatous disease  [L92.9]   • Abscess of right iliac muscle and right gluteus medius muscle [L02.91]   • Pseudoaneurysm following procedure (HCC) [I97.89, I72.9]   • Sepsis (HCC) [A41.9]   • History of breast cancer [Z85.3]       Assessment/Plan:   Brain lesions  Tubercular  Encephalopathy, intermittent waxing and waning  MRI 4/23 \"supra and infratentorial brain parenchyma. Decrease in the size of the lesions. Interval reduction in the extent of the surrounding white matter edema consistent with improvement/treatment response of the most of the multifocal brain abscesses.  MRI on 6/8 with interval progression of supra and infratentorial intra--axial nodules and associated edema.  New right thalamus lesion. Radiology favors infection over neoplasm (had been off abx)  Mult blood cultures neg; CHAS neg 3/15 and 5/24  MRI 6/22 worsening CNS lesions  S/p right craniotomy and resection of mass with biopsy on 6/28. Biopsy-per note fungal appearing elements seen per Neurosurgery-path   +necrotizing granulomas. All stains negative in EPIC  Fungal culture- negative to date  Bacterial cultures-negative to date  Brucella ab - negative  Coxiella ab - negative   Histo ab serum & antigen urine-negative  Blasto ab - negative   Repeat MRI on 7/14 + increase in in nodular enhancing lesions throughout the brain parenchyma  Continue RIPE+B6 for 2 months (through 9/2/2019) followed by Rifamate with B6 for an additional 7-12 months  Follow-up brain biopsy specimen sent to 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 The collection within the right gluteal muscle has unchanged while the fluid collection within the right iliacis muscle is increased somewhat in size.\" 2.7 cm  Cultures 3/29 and 4/4 neg  MRI on 5/20/2019 - interval decrease in collection in R gluteal muscle and persistent multiloculated collection in R iliacus muscle.   CT 5/28 no change  s/p drainage on 5/30/2019-cultures negative  S/p removal hardware 6/5-no cultures done  CT on 6/8 with residual abscess in the right iliacus muscle, 2.5 x 1.8 cm, slightly smaller than prior  s/p CT-guided deep bone biopsy 6/19/2019.  Cx +Mtb  Hip core +AFB  MRI 6/28 chronic discitis, diffuse OM L5, 3 and 4 cm abscesses right glute, 1.5 cm abscess or necrosis right posterior ileum, 3.3 cm abscess right SI joint  Continue TB therapy as above with levofloxacin per [facility]     +QuantiGold  Path with necrotizing granulomas  AFB stain neg  AFB cultures + 6/19  Started RIPE +B6 7/3  Monitor for visual changes while on ethambutol.    Ophthalmology evaluation for baseline vision as well as color vision testing      Type 2 DM, chronic  Hemoglobin A1c 6.3   Keep BS under 150 to help control current infection      Will follow up at [facility] for meds and case management after discharge    I have performed a physical exam and reviewed and updated ROS as of today.  In review of yesterday's note dated  8/2/2019, there are no changes except as documented above.                    "

## 2019-08-04 PROCEDURE — 700101 HCHG RX REV CODE 250: Performed by: FAMILY MEDICINE

## 2019-08-04 PROCEDURE — A9270 NON-COVERED ITEM OR SERVICE: HCPCS | Performed by: FAMILY MEDICINE

## 2019-08-04 PROCEDURE — 700102 HCHG RX REV CODE 250 W/ 637 OVERRIDE(OP): Performed by: INTERNAL MEDICINE

## 2019-08-04 PROCEDURE — A9270 NON-COVERED ITEM OR SERVICE: HCPCS | Performed by: INTERNAL MEDICINE

## 2019-08-04 PROCEDURE — 700102 HCHG RX REV CODE 250 W/ 637 OVERRIDE(OP): Performed by: FAMILY MEDICINE

## 2019-08-04 PROCEDURE — 700111 HCHG RX REV CODE 636 W/ 250 OVERRIDE (IP): Performed by: INTERNAL MEDICINE

## 2019-08-04 PROCEDURE — 770001 HCHG ROOM/CARE - MED/SURG/GYN PRIV*

## 2019-08-04 PROCEDURE — 99231 SBSQ HOSP IP/OBS SF/LOW 25: CPT | Performed by: INTERNAL MEDICINE

## 2019-08-04 RX ADMIN — ISONIAZID 300 MG: 300 TABLET ORAL at 04:35

## 2019-08-04 RX ADMIN — LEVETIRACETAM 500 MG: 500 TABLET, FILM COATED ORAL at 16:02

## 2019-08-04 RX ADMIN — SENNOSIDES, DOCUSATE SODIUM 2 TABLET: 50; 8.6 TABLET, FILM COATED ORAL at 04:35

## 2019-08-04 RX ADMIN — LEVOFLOXACIN 750 MG: 750 TABLET, FILM COATED ORAL at 04:35

## 2019-08-04 RX ADMIN — OXYCODONE HYDROCHLORIDE 5 MG: 5 TABLET ORAL at 09:04

## 2019-08-04 RX ADMIN — GABAPENTIN 400 MG: 400 CAPSULE ORAL at 11:07

## 2019-08-04 RX ADMIN — LIDOCAINE 2 PATCH: 50 PATCH CUTANEOUS at 11:08

## 2019-08-04 RX ADMIN — POTASSIUM CHLORIDE 20 MEQ: 20 TABLET, EXTENDED RELEASE ORAL at 04:35

## 2019-08-04 RX ADMIN — PRAMIPEXOLE DIHYDROCHLORIDE 0.25 MG: 0.5 TABLET ORAL at 15:06

## 2019-08-04 RX ADMIN — LEVETIRACETAM 500 MG: 500 TABLET, FILM COATED ORAL at 04:35

## 2019-08-04 RX ADMIN — CINACALCET HYDROCHLORIDE 60 MG: 30 TABLET, COATED ORAL at 04:35

## 2019-08-04 RX ADMIN — Medication 1 CAPSULE: at 08:15

## 2019-08-04 RX ADMIN — ACETAMINOPHEN 1000 MG: 500 TABLET ORAL at 16:00

## 2019-08-04 RX ADMIN — PYRAZINAMIDE 1000 MG: 500 TABLET ORAL at 04:35

## 2019-08-04 RX ADMIN — ACETAMINOPHEN 1000 MG: 500 TABLET ORAL at 04:35

## 2019-08-04 RX ADMIN — ATORVASTATIN CALCIUM 10 MG: 10 TABLET, FILM COATED ORAL at 16:01

## 2019-08-04 RX ADMIN — PRAMIPEXOLE DIHYDROCHLORIDE 0.25 MG: 0.5 TABLET ORAL at 21:35

## 2019-08-04 RX ADMIN — AMLODIPINE BESYLATE 10 MG: 5 TABLET ORAL at 04:35

## 2019-08-04 RX ADMIN — TEMAZEPAM 15 MG: 15 CAPSULE ORAL at 21:35

## 2019-08-04 RX ADMIN — HEPARIN SODIUM 5000 UNITS: 5000 INJECTION, SOLUTION INTRAVENOUS; SUBCUTANEOUS at 04:35

## 2019-08-04 RX ADMIN — MAGNESIUM 64 MG (MAGNESIUM CHLORIDE) TABLET,DELAYED RELEASE 64 MG: at 04:35

## 2019-08-04 RX ADMIN — MAGNESIUM 64 MG (MAGNESIUM CHLORIDE) TABLET,DELAYED RELEASE 64 MG: at 16:01

## 2019-08-04 RX ADMIN — METOPROLOL TARTRATE 25 MG: 25 TABLET, FILM COATED ORAL at 04:35

## 2019-08-04 RX ADMIN — GABAPENTIN 400 MG: 400 CAPSULE ORAL at 04:35

## 2019-08-04 RX ADMIN — GABAPENTIN 400 MG: 400 CAPSULE ORAL at 16:01

## 2019-08-04 RX ADMIN — OMEPRAZOLE 20 MG: 20 CAPSULE, DELAYED RELEASE ORAL at 04:35

## 2019-08-04 RX ADMIN — PRAMIPEXOLE DIHYDROCHLORIDE 0.25 MG: 0.5 TABLET ORAL at 08:15

## 2019-08-04 RX ADMIN — HEPARIN SODIUM 5000 UNITS: 5000 INJECTION, SOLUTION INTRAVENOUS; SUBCUTANEOUS at 16:02

## 2019-08-04 RX ADMIN — SENNOSIDES, DOCUSATE SODIUM 2 TABLET: 50; 8.6 TABLET, FILM COATED ORAL at 16:01

## 2019-08-04 RX ADMIN — PYRIDOXINE HCL TAB 50 MG 100 MG: 50 TAB at 04:35

## 2019-08-04 RX ADMIN — LOSARTAN POTASSIUM 75 MG: 50 TABLET ORAL at 04:35

## 2019-08-04 RX ADMIN — ACETAMINOPHEN 1000 MG: 500 TABLET ORAL at 11:07

## 2019-08-04 RX ADMIN — OXYCODONE HYDROCHLORIDE 5 MG: 5 TABLET ORAL at 15:57

## 2019-08-04 RX ADMIN — RIFAMPIN 600 MG: 300 CAPSULE ORAL at 04:35

## 2019-08-04 RX ADMIN — ETHAMBUTOL HYDROCHLORIDE 800 MG: 400 TABLET, FILM COATED ORAL at 04:35

## 2019-08-04 RX ADMIN — FERROUS SULFATE TAB 325 MG (65 MG ELEMENTAL FE) 325 MG: 325 (65 FE) TAB at 08:15

## 2019-08-04 ASSESSMENT — ENCOUNTER SYMPTOMS
COUGH: 0
SPEECH CHANGE: 0
PALPITATIONS: 0
NERVOUS/ANXIOUS: 0
BACK PAIN: 0
BLURRED VISION: 0
HEADACHES: 0
FEVER: 0
POLYDIPSIA: 0
HEARTBURN: 0
CHILLS: 0
HEMOPTYSIS: 0
ORTHOPNEA: 0
HALLUCINATIONS: 0
NECK PAIN: 0
FOCAL WEAKNESS: 0
PHOTOPHOBIA: 0
BRUISES/BLEEDS EASILY: 0
FLANK PAIN: 0
WEIGHT LOSS: 0
TREMORS: 0
DOUBLE VISION: 0
NAUSEA: 0
VOMITING: 0
SPUTUM PRODUCTION: 0

## 2019-08-04 ASSESSMENT — LIFESTYLE VARIABLES: SUBSTANCE_ABUSE: 0

## 2019-08-04 NOTE — PROGRESS NOTES
[facility] Medicine Daily Progress Note    Date of Service  8/4/2019    Chief Complaint  68 y.o. female admitted 5/29/2019 with right iliac and gluteus abscess    Hospital Course  Patient is a 68 year old with history of breast cancer, copd, dm, gerd, dl and htn who was admitted with right iliac gluteus abscess.  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    Interval Problem Update  8/2  Patient is complaining of worsening of pain in the hip on the left side, especially when she is sitting on the toilet.  I will order left hip and pelvic x-ray  I started her on scheduled Tylenol  8/3  Patient in no distress.  Denies any change in the left buttock pain, which she feels only when she is sitting on a toilet  Vital stable  8/4  Sleeping comfortably in the bed.  In no acute distress.  No overnight events per  Consultants/Specialty  ID  Neurosurgery  Ortho    Code Status  DNR/DNI    Disposition  snf pending, would like to go to snf near family in [place]      Review of Systems  Review of Systems   Constitutional: Negative for chills, fever and weight loss.   HENT: Negative for ear pain, hearing loss and tinnitus.    Eyes: Negative for blurred vision, double vision and photophobia.   Respiratory: Negative for cough, hemoptysis and sputum production.    Cardiovascular: Negative for chest pain, palpitations and orthopnea.   Gastrointestinal: Negative for heartburn, nausea and vomiting.   Genitourinary: Negative for dysuria, flank pain, frequency and hematuria.   Musculoskeletal: Positive for joint pain. Negative for back pain and neck pain.   Skin: Negative for itching and rash.   Neurological: Negative for tremors, speech change, focal weakness and headaches.   Endo/Heme/Allergies: Negative for environmental allergies and polydipsia. Does not bruise/bleed easily.   Psychiatric/Behavioral: Negative for  hallucinations and substance abuse. The patient is not nervous/anxious.         Physical Exam  Temp:  [36.8 °C (98.3 °F)-36.9 °C (98.5 °F)] 36.8 °C (98.3 °F)  Pulse:  [63-81] 63  Resp:  [16-20] 16  BP: (122-131)/(57-73) 130/57  SpO2:  [95 %-99 %] 96 %    Physical Exam   Constitutional: She is oriented to person, place, and time. She appears well-developed and well-nourished.   HENT:   Head: Normocephalic and atraumatic.   Eyes: Pupils are equal, round, and reactive to light. EOM are normal.   Neck: Normal range of motion. Neck supple.   Cardiovascular: Normal rate, regular rhythm and normal heart sounds.   Pulmonary/Chest: Effort normal and breath sounds normal.   Abdominal: Soft. Bowel sounds are normal.   Musculoskeletal: Normal range of motion.   Tenderness to palpation  Of the left buttock   Neurological: She is alert and oriented to person, place, and time.   Skin: Skin is warm and dry.   Psychiatric: She has a normal mood and affect. Cognition and memory are impaired.   Nursing note and vitals reviewed.    Examined the patient on 8/4.  No significant changes from 8/2 except as documented  Fluids    Intake/Output Summary (Last 24 hours) at 8/4/2019 1232  Last data filed at 8/4/2019 0500  Gross per 24 hour   Intake 930 ml   Output --   Net 930 ml       Laboratory                        Imaging  DX-HIP-UNILATERAL-WITH PELVIS-1 VIEW LEFT   Final Result      No LEFT hip or pelvic fracture.      IR-INJ-EXT PSEUDOANEURYSM PERC   Final Result      1.  Ultrasound guided thrombin injection for treatment of a RIGHT gluteal 3.6 cm pseudoaneurysm.      US-EXTREMITY ARTERY LOWER UNILAT RIGHT   Final Result      Right gluteal pseudoaneurysm measuring 3.65 x 2.24 x 2.31 cm.      CTA ABDOMEN PELVIS W & W/O POST PROCESS   Final Result      1.  Interval enlargement of a hyperdense ovoid masslike structure immediately posterior to the right ilium, possibly representing a pseudoaneurysm.   2.  Stable thin-walled apparent  fluid-filled structure immediately deep to the right ilium.   3.   Minimal retroperitoneal adenopathy, grossly stable.   4.  Cholelithiasis.      MR-BRAIN-WITH & W/O   Final Result      1.  Innumerable nodular enhancing lesions throughout the brain parenchyma both the supra and infratentorial compartments predominantly near the gray-white junction. There has been interval increase in size of several of these lesions since previous exam.    These changes may be secondary to metastatic disease or granulomatous disease.      2.  Interval right parietal craniotomy with underlying elliptical postsurgical defect.      3.  Interval increase in size of index right thalamic lesion which has increased vasogenic edema since previous exam.      4.  Age-related cerebral atrophy.      IR-PICC LINE PLACEMENT W/ GUIDANCE > AGE 5   Final Result                  Ultrasound-guided PICC placement performed by qualified nursing staff as    above.          SA-[id number]-2 VIEW   Final Result         1.  Moderate stool in the colon suggests changes of constipation, otherwise nonspecific bowel gas pattern   2.  Atherosclerosis      CT-NEEDLE BX-ABD-RETROPERITONL   Final Result      1.  CT GUIDED biopsy of a right pelvic phlegmon.      2. Significant interval enlargement of a right gluteal pseudoaneurysm. CTA and consideration for possible intervention either with direct thrombin injection or endovascular treatment was suggested. This was conveyed to Dr. Alatorre on 7/11/2019 via [place]    text at 1750 hours.      US-EXTREMITY VENOUS LOWER BILAT   Final Result      IR-US GUIDED PIV   Final Result    Ultrasound-guided PERIPHERAL IV INSERTION performed by    qualified nursing staff as above.            MR-LUMBAR SPINE-WITH & W/O   Final Result         1.  Grade 2-3 spondylolisthesis at the L5-S1 level with bilateral spondylolysis of the pars interarticularis. This causes severe bilateral neural foraminal stenosis at this level.      2.  Interval  removal of posterior fusion hardware at the lumbosacral junction.      3.  Diffuse osteomyelitis involving the L5 vertebral body and the first 3 sacral segments.      4.  Chronic discitis at the L5-S1 level with anterior paraspinous abscess at this level and anterior to the S1 sacral segment.      5.  Smaller intraosseous bone bone abscesses or areas of necrosis noted in the sacrum.      6.  There is also evidence of osteomyelitis involving the jose antonio and sacrum bilaterally along the course of the previous sacral fixation screw. There is a large 4 cm abscess noted in the right gluteal soft tissues in a smaller 3 cm chronic appearing    abscess in the left gluteal musculature.      7.  There is a 1.5 cm intraosseous abscess or area of necrosis in the right posterior ilium.      8.  There is a 3.3 cm centimeters multiloculated abscess anterior to the right sacroiliac joint.      MR-STEALTH BRAIN WITH & W/O   Final Result         1.  Innumerable subcentimeter brain metastases, many at the gray-white junction but also multiple noted throughout the basal ganglia and brainstem.      2.  Largest index lesion is noted in the right thalamus and has increased in size since previous exam and currently measures 9 mm and has a moderate amount of surrounding vasogenic edema.      US-EXTREMITY NON VASCULAR UNILATERAL RIGHT   Final Result      No right hip joint effusion. No soft tissue fluid collection.      MR-BRAIN-WITH & W/O   Final Result      1.  Innumerable supra and infratentorial enhancing nodules are again noted throughout the brain parenchyma with multiple lesions appearing somewhat larger and with increased enhancement. No associated diffusion restriction. Unchanged differential    considerations including bacterial or fungal infection versus metastatic disease.   2.  Mild diffuse cerebral substance loss.   3.  Mild microangiopathic ischemic change versus demyelination or gliosis.      CT-NEEDLE BX-DEEP BONE   Final  Result      CT GUIDED RIGHT ILIUM DEEP BONE BIOPSY. SAMPLES WERE SENT TO MICROBIOLOGY FOR ANALYSIS.      CT-PELVIS WITH   Final Result         1.  No significant interval change in the size of the RIGHT iliac muscle fluid collections   2.  Hyperdense RIGHT gluteal lesion moderately suspicious for pseudoaneurysm with adjacent hematoma.   3.  Changes in the RIGHT iliac bone, BILATERAL sacrum and LEFT iliac bone suspicious for osteomyelitis   4.  Changes at L5-S1 suspicious for discitis osteomyelitis      CT-PELVIS WITH   Final Result      1.  Residual or recurrent abscess within the right iliacus muscle measuring 2.5 x 1.8 cm. It slightly smaller than on 5/28/2019      2.  Interval removal of hardware from the iliac bones and sacrum      3.  Lytic change of the right iliac bone and the sacrum. This could be related to hardware versus osteomyelitis.      4.  Subacute fractures of the right ilium, sacrum, and there is lucency within the left iliac bone that is likely from hardware      MR-BRAIN-WITH & W/O   Final Result      1.  Interval progression of supra and infratentorial intra-axial nodules and associated edema. This short-term interval progression favors infection over neoplasm though both remain considerations.   2.  Mild atrophy      DX-PORTABLE FLUOROSCOPY < 1 HOUR   Final Result         Portable fluoroscopy utilized for 2 minutes.      DX-PELVIS-1 OR 2 VIEWS   Final Result      Status post hardware removal from the right SI joint      DX-CHEST-PORTABLE (1 VIEW)   Final Result      Interstitial prominence could be due to pulmonary edema or hypoventilatory change.      DX-CHEST-LIMITED (1 VIEW)   Final Result      LEFT basilar underinflation atelectasis or pneumonia      CT-DRAIN-HEMATOMA - SEROMA   Final Result      CT-guided RIGHT pelvic fluid collection aspiration, laboratory evaluation pending              Assessment/Plan  * Brain lesion- (present on admission)  Assessment & Plan  Right-sided craniotomy for  resection of superficial mass  6/28/2019  Continue Isoniazid, Rifampin, Ethambutol, Pyrazinamide per ID, discussed with ID they have reached out to [facility] to discuss outpatient regimen  Continue Keppra for seizure prophylaxis, per ID consider restarting steroids if she has breakthrough seizures  No seizures observed    Hypokalemia- (present on admission)  Assessment & Plan  Replacing  following    Osteomyelitis (HCC)- (present on admission)  Assessment & Plan  Not osteo- actually disseminated TB  ID following    Granulomatous disease - (present on admission)  Assessment & Plan  Isoniazid, Rifampin, Ethambutol, Pyrazinamide    Pseudoaneurysm following procedure (HCC)- (present on admission)  Assessment & Plan    POD #5 s/p IR thrombin injection for thrombosed psedudoanerysm    Non-severe protein-calorie malnutrition (HCC)- (present on admission)  Assessment & Plan  Nutrition consult      Sepsis (HCC)- (present on admission)  Assessment & Plan  This is sepsis (without associated acute organ dysfunction).    Source - Right Iliac and Gluteus medius abscess  Resolved    Hypomagnesemia- (present on admission)  Assessment & Plan  Oral magnesium    Abscess of right iliac muscle and right gluteus medius muscle- (present on admission)  Assessment & Plan  CT guided pelvic aspiration 5/30/2019  Hardware removal, pelvis 6/5/2019  CT guided deep bone biopsy 6/19/2019  CT guided aspiration/biopsy of a R gluteal fluid collection/phlegmon 7/11/2019 - consistent with TB  Continue Isoniazid, Rifampin, Ethambutol, Pyrazinamide  Associated pain improving  Levaquin to continue for 9-12 months -- recommended by [facility] and ID      Dysphagia- (present on admission)  Assessment & Plan  Dysphagia 3  Speech following    History of DVT (deep vein thrombosis)- (present on admission)  Assessment & Plan  Xarelto was held due to hip hematoma and rifampin  Consider starting coumadin in 1-2 weeks if h/h remains stable    Essential  hypertension- (present on admission)  Assessment & Plan  Continue Metoprolol, Losartan and Amlodipine as tolerated  Blood pressure stable    RLS (restless legs syndrome)- (present on admission)  Assessment & Plan  Pramipexole    Chronic pain- (present on admission)  Assessment & Plan    Opiates were causing confusion   Has now been tapered off long acting opiates and doing well  8/2 complaining of worsening of left-sided hip/pelvic pain.  x-ray of left femur/pelvis pending  Tylenol scheduled started    History of breast cancer- (present on admission)  Assessment & Plan  history left mastectomy and breast implant.      COPD (chronic obstructive pulmonary disease) (HCC)- (present on admission)  Assessment & Plan  Continue RT protocol  No s/o exacerbation       VTE prophylaxis: Heparin

## 2019-08-04 NOTE — PROGRESS NOTES
Assumed pt care at 1900. Received bedside report from PATRICK Bates. Pt A&O x 4. Pt denies numbness and tingling. Pt is continent. Ambulates with FWW. Patient tolerating regular diet. Plan of care discussed. Fall precautions in place. Q4 hour neuro checks and hourly rounding in place.

## 2019-08-05 LAB
MAGNESIUM SERPL-MCNC: 1.7 MG/DL (ref 1.5–2.5)
PHOSPHATE SERPL-MCNC: 4.2 MG/DL (ref 2.5–4.5)

## 2019-08-05 PROCEDURE — 770001 HCHG ROOM/CARE - MED/SURG/GYN PRIV*

## 2019-08-05 PROCEDURE — A9270 NON-COVERED ITEM OR SERVICE: HCPCS | Performed by: INTERNAL MEDICINE

## 2019-08-05 PROCEDURE — 700102 HCHG RX REV CODE 250 W/ 637 OVERRIDE(OP): Performed by: INTERNAL MEDICINE

## 2019-08-05 PROCEDURE — 99231 SBSQ HOSP IP/OBS SF/LOW 25: CPT | Performed by: INTERNAL MEDICINE

## 2019-08-05 PROCEDURE — 700111 HCHG RX REV CODE 636 W/ 250 OVERRIDE (IP): Performed by: INTERNAL MEDICINE

## 2019-08-05 PROCEDURE — A9270 NON-COVERED ITEM OR SERVICE: HCPCS | Performed by: FAMILY MEDICINE

## 2019-08-05 PROCEDURE — 99233 SBSQ HOSP IP/OBS HIGH 50: CPT | Performed by: INTERNAL MEDICINE

## 2019-08-05 PROCEDURE — 36415 COLL VENOUS BLD VENIPUNCTURE: CPT

## 2019-08-05 PROCEDURE — 700102 HCHG RX REV CODE 250 W/ 637 OVERRIDE(OP): Performed by: FAMILY MEDICINE

## 2019-08-05 PROCEDURE — 84100 ASSAY OF PHOSPHORUS: CPT

## 2019-08-05 PROCEDURE — 97116 GAIT TRAINING THERAPY: CPT | Mod: XU

## 2019-08-05 PROCEDURE — 700101 HCHG RX REV CODE 250: Performed by: FAMILY MEDICINE

## 2019-08-05 PROCEDURE — 97535 SELF CARE MNGMENT TRAINING: CPT | Mod: XE

## 2019-08-05 PROCEDURE — 97530 THERAPEUTIC ACTIVITIES: CPT

## 2019-08-05 PROCEDURE — 83735 ASSAY OF MAGNESIUM: CPT

## 2019-08-05 RX ADMIN — METOPROLOL TARTRATE 25 MG: 25 TABLET, FILM COATED ORAL at 07:02

## 2019-08-05 RX ADMIN — ETHAMBUTOL HYDROCHLORIDE 800 MG: 400 TABLET, FILM COATED ORAL at 06:33

## 2019-08-05 RX ADMIN — PYRAZINAMIDE 1000 MG: 500 TABLET ORAL at 06:33

## 2019-08-05 RX ADMIN — HEPARIN SODIUM 5000 UNITS: 5000 INJECTION, SOLUTION INTRAVENOUS; SUBCUTANEOUS at 06:33

## 2019-08-05 RX ADMIN — METOPROLOL TARTRATE 25 MG: 25 TABLET, FILM COATED ORAL at 17:12

## 2019-08-05 RX ADMIN — GABAPENTIN 400 MG: 400 CAPSULE ORAL at 12:30

## 2019-08-05 RX ADMIN — MAGNESIUM 64 MG (MAGNESIUM CHLORIDE) TABLET,DELAYED RELEASE 64 MG: at 06:33

## 2019-08-05 RX ADMIN — LEVETIRACETAM 500 MG: 500 TABLET, FILM COATED ORAL at 06:34

## 2019-08-05 RX ADMIN — ATORVASTATIN CALCIUM 10 MG: 10 TABLET, FILM COATED ORAL at 17:14

## 2019-08-05 RX ADMIN — AMLODIPINE BESYLATE 10 MG: 5 TABLET ORAL at 06:33

## 2019-08-05 RX ADMIN — OMEPRAZOLE 20 MG: 20 CAPSULE, DELAYED RELEASE ORAL at 06:34

## 2019-08-05 RX ADMIN — MAGNESIUM 64 MG (MAGNESIUM CHLORIDE) TABLET,DELAYED RELEASE 64 MG: at 17:12

## 2019-08-05 RX ADMIN — TEMAZEPAM 15 MG: 15 CAPSULE ORAL at 21:40

## 2019-08-05 RX ADMIN — PRAMIPEXOLE DIHYDROCHLORIDE 0.25 MG: 0.5 TABLET ORAL at 21:40

## 2019-08-05 RX ADMIN — SENNOSIDES, DOCUSATE SODIUM 2 TABLET: 50; 8.6 TABLET, FILM COATED ORAL at 06:35

## 2019-08-05 RX ADMIN — GABAPENTIN 400 MG: 400 CAPSULE ORAL at 17:12

## 2019-08-05 RX ADMIN — HEPARIN SODIUM 5000 UNITS: 5000 INJECTION, SOLUTION INTRAVENOUS; SUBCUTANEOUS at 17:12

## 2019-08-05 RX ADMIN — POTASSIUM CHLORIDE 20 MEQ: 20 TABLET, EXTENDED RELEASE ORAL at 06:34

## 2019-08-05 RX ADMIN — GABAPENTIN 400 MG: 400 CAPSULE ORAL at 06:35

## 2019-08-05 RX ADMIN — FERROUS SULFATE TAB 325 MG (65 MG ELEMENTAL FE) 325 MG: 325 (65 FE) TAB at 07:18

## 2019-08-05 RX ADMIN — LOSARTAN POTASSIUM 75 MG: 50 TABLET ORAL at 06:34

## 2019-08-05 RX ADMIN — PRAMIPEXOLE DIHYDROCHLORIDE 0.25 MG: 0.5 TABLET ORAL at 09:05

## 2019-08-05 RX ADMIN — PYRIDOXINE HCL TAB 50 MG 100 MG: 50 TAB at 06:34

## 2019-08-05 RX ADMIN — CINACALCET HYDROCHLORIDE 60 MG: 30 TABLET, COATED ORAL at 06:33

## 2019-08-05 RX ADMIN — ACETAMINOPHEN 1000 MG: 500 TABLET ORAL at 17:12

## 2019-08-05 RX ADMIN — OXYCODONE HYDROCHLORIDE 5 MG: 5 TABLET ORAL at 09:06

## 2019-08-05 RX ADMIN — PRAMIPEXOLE DIHYDROCHLORIDE 0.25 MG: 0.5 TABLET ORAL at 15:09

## 2019-08-05 RX ADMIN — LIDOCAINE 2 PATCH: 50 PATCH CUTANEOUS at 12:29

## 2019-08-05 RX ADMIN — LEVOFLOXACIN 750 MG: 750 TABLET, FILM COATED ORAL at 06:34

## 2019-08-05 RX ADMIN — OXYCODONE HYDROCHLORIDE 5 MG: 5 TABLET ORAL at 15:09

## 2019-08-05 RX ADMIN — ISONIAZID 300 MG: 300 TABLET ORAL at 06:34

## 2019-08-05 RX ADMIN — Medication 1 CAPSULE: at 07:18

## 2019-08-05 RX ADMIN — ACETAMINOPHEN 1000 MG: 500 TABLET ORAL at 06:33

## 2019-08-05 RX ADMIN — RIFAMPIN 600 MG: 300 CAPSULE ORAL at 06:34

## 2019-08-05 RX ADMIN — OXYCODONE HYDROCHLORIDE 10 MG: 5 TABLET ORAL at 02:09

## 2019-08-05 RX ADMIN — ACETAMINOPHEN 1000 MG: 500 TABLET ORAL at 12:30

## 2019-08-05 RX ADMIN — LEVETIRACETAM 500 MG: 500 TABLET, FILM COATED ORAL at 17:12

## 2019-08-05 ASSESSMENT — ENCOUNTER SYMPTOMS
SPUTUM PRODUCTION: 0
NAUSEA: 0
BLURRED VISION: 0
DOUBLE VISION: 0
BACK PAIN: 0
HEARTBURN: 0
DIZZINESS: 0
HEMOPTYSIS: 0
PALPITATIONS: 0
SPEECH CHANGE: 0
HEADACHES: 0
FOCAL WEAKNESS: 0
CHILLS: 0
VOMITING: 0
SHORTNESS OF BREATH: 0
POLYDIPSIA: 0
BRUISES/BLEEDS EASILY: 0
WEIGHT LOSS: 0
NECK PAIN: 0
DIARRHEA: 0
FLANK PAIN: 0
PHOTOPHOBIA: 0
HALLUCINATIONS: 0
FEVER: 0
COUGH: 0
NERVOUS/ANXIOUS: 0
ABDOMINAL PAIN: 0
ORTHOPNEA: 0
TREMORS: 0

## 2019-08-05 ASSESSMENT — COGNITIVE AND FUNCTIONAL STATUS - GENERAL
STANDING UP FROM CHAIR USING ARMS: A LITTLE
TOILETING: A LITTLE
TURNING FROM BACK TO SIDE WHILE IN FLAT BAD: A LITTLE
MOVING TO AND FROM BED TO CHAIR: A LITTLE
MOVING FROM LYING ON BACK TO SITTING ON SIDE OF FLAT BED: A LITTLE
DRESSING REGULAR UPPER BODY CLOTHING: A LITTLE
CLIMB 3 TO 5 STEPS WITH RAILING: A LOT
HELP NEEDED FOR BATHING: A LITTLE
SUGGESTED CMS G CODE MODIFIER DAILY ACTIVITY: CK
WALKING IN HOSPITAL ROOM: A LITTLE
DRESSING REGULAR LOWER BODY CLOTHING: A LITTLE
MOBILITY SCORE: 17
EATING MEALS: A LITTLE
PERSONAL GROOMING: A LITTLE
SUGGESTED CMS G CODE MODIFIER MOBILITY: CK
DAILY ACTIVITIY SCORE: 18

## 2019-08-05 ASSESSMENT — GAIT ASSESSMENTS
ASSISTIVE DEVICE: FRONT WHEEL WALKER
GAIT LEVEL OF ASSIST: SUPERVISED
DISTANCE (FEET): 150
DEVIATION: DECREASED HEEL STRIKE;DECREASED TOE OFF

## 2019-08-05 ASSESSMENT — LIFESTYLE VARIABLES: SUBSTANCE_ABUSE: 0

## 2019-08-05 NOTE — THERAPY
"Occupational Therapy Treatment completed with focus on ADLs, ADL transfers and patient education.  Functional Status:  [place] sit>stand, [place] ambulation w/FWW, SPV toilet txf using bilateral grab bars, SPV toileting, SPV standing grooming (handwash, hair brush), SPV don/doff socks, [place] don pants  Plan of Care: Will benefit from Occupational Therapy 3 times per week  Discharge Recommendations:  Equipment Will Continue to Assess for Equipment Needs. Post-acute therapy Recommend post-acute placement for additional occupational therapy services prior to discharge home. Patient can tolerate post-acute therapies at a 5x/week frequency.    See \"Rehab Therapy-Acute\" Patient Summary Report for complete documentation.     Pt seen for OT tx, completed basic ADLs with SPV-Bolivar. Pt receptive to cues to slow RR with pursed lip breathing, required repeated cues throughout session. Improved standing tolerance with standing grooming and ambulation to BR and in hallway w/FWW. Pt completed toilet txf and toileting with SPV however did require [place] to stand from bedside chair x2. Pt with improved independence with LB dress, requires extra time and steadying assist in standing in order to pull pants over hips. Acute OT to continue to follow.   "

## 2019-08-05 NOTE — PROGRESS NOTES
Infectious Disease Progress Note    Author: Lito Burrows M.D. Date & Time of service: 8/5/2019  8:57 AM    Chief Complaint:  Brain abscess, gluteal abscess  Vertebral OM     Interval History:  68 y.o. female admitted 5/29/2019 as a transfer from [facility] since 4/30/2019. + diabetes, SANTOSH of right hip with hardware, and breast cancer who was originally admitted to [facility] on 03/08/2019 for right hip and pelvic pain.  Work-up revealed a right iliopsoas lesion, gluteal abscess, and mult brain abscesses     7/4 AF much more alert and conversant today-c/o left hip pain, unrelenting and would like parietal dressing changed. HA at surgical site.  Denies SE abx. No new neurodeficits  7/5 afebrile WBC 5.4.  Patient sleeping but arousable.  She denies any headache, nausea, vomiting.  7/6 afebrile WBC 6.5.  Patient sitting up in chair and having excruciating back pain  7/8 Pt has no specific complaints, she is mildly confused today.  She seems to be tolerating her medications with no significant lab abnormalities.  7/9 AF, O2 RA, Significant left hip pain today.  She does appear to be tolerating her antibiotics.  Ultrasound of bilateral lower extremities has been ordered.  7/10 AF, O2 RA,  Plan for IR drainage of sacral collection soon.   7/11 AF, O2 RA,  Pt continued on RIPE and ampho.  She is tolerating well overall, requiring some mag and potassium replacement.  She is complaining of severe pain in left hip again today.  Plan for biopsy later today.   7/12  IR biopsy of R gluteal abscess/hip. AF, O2 2 L NC,   tolerating antibiotics so far.  She is quite somnolent today but per nursing she was oriented x4 earlier  7/14 AF, O2 RA, more alert today, conversant and asking for advance in diet. Left hip pain ongoing but improved.   7/15 afebrile WBC 4.4.  Patient's speech is somewhat muffled but she is alert and responding to questions appropriately.  MRI shows worsening lesions and amphotericin stopped.  Nurse reports  waxing and waning mental status  7/16 afebrile.  Patient sleeping but arousable.  She is answer questions appropriately but then forgets that she is in the hospital.  7/17 afebrile WBC 4.9 patient is very drowsy as she states she did not sleep well yesterday.  She is asking about whether or not she has a fungal infection.  Plan of care discussed with patient.  7/19 afebrile patient continues to only endorse left hip pain exacerbated by sitting up in the chair.  She sat in the chair for 30 minutes yesterday.  Getting out of bed is limited secondary to left hip pain.  Mental status remains about the same with intermittent waxing and waning.  No new issues today per RN  7/21 afebrile patient is much more awake and alert today.  She is answering questions appropriately.  Yesterday she was complaining of some abdominal pain so CT scan was done and revealed interval enlargement of a hyperdense ovoid masslike structure posterior to the right ilium concerning for pseudoaneurysm.  7/22/2019 no fevers.  No new issues overnight.  Had her ultrasound today follow the results  7/23/2019-no fevers.  Ongoing pain.  The AFB cultures have come back positive for TB  7/24/2019 no fevers.  No new issues overnight.  Very anxious and agitated  7/25/2019 no fevers.  No new issues.  Says she feels slightly better.  7/26 AF WBC 4.5 tolerating meds well-wants cream to decrease size of surgical scar-has right-sided rib pain and joint pain  7/27 AF ambulating with assist with walker-no new complaints  7/28 AF same pain complaint-remains debilitated with intermittent confusion  7/29 AF WBC 4.2 no clinical change  7/30 AF WBC 3.3 sleeping sounding-dw RN has been complaining of hip pain but able to ambulate  7/31 AF WBC 3 ambulating in peng with rolling walker-NAD. No new complaints  8/1 AF knitting in bed-looks comfortable but requesting more pain meds  8/2 AF No CBC no new complaints  8/3 AF tolerating PO-no rash Same pain  8/5 afebrile, no CBC,  continues to report pain, no new medications, tolerating antibiotics.    Review of Systems:  Review of Systems   Constitutional: Negative for chills and fever.   Respiratory: Negative for cough and shortness of breath.    Cardiovascular: Negative for chest pain.   Gastrointestinal: Negative for abdominal pain, diarrhea, nausea and vomiting.   Genitourinary: Negative for dysuria.   Musculoskeletal: Positive for joint pain.   Skin: Negative for itching and rash.   Neurological: Negative for dizziness.   All other systems reviewed and are negative.      Hemodynamics:  Temp (24hrs), Av.3 °C (97.4 °F), Min:36.1 °C (97 °F), Max:36.4 °C (97.6 °F)  Temperature: 36.4 °C (97.6 °F)  Pulse  Av.5  Min: 49  Max: 195   Blood Pressure : 117/58       Physical Exam:  Physical Exam   Constitutional: She is oriented to person, place, and time. No distress.   Elderly  Chronically ill-appearing   HENT:   Mouth/Throat: Oropharynx is clear and moist. No oropharyngeal exudate.   Right parietal surgical site healed   Eyes: Right eye exhibits no discharge. Left eye exhibits no discharge.   Neck: Neck supple. No JVD present.   Cardiovascular: Normal rate and regular rhythm.   Pulmonary/Chest: Effort normal. No stridor. She has no wheezes. She has no rales.   Abdominal: Soft. There is no rebound and no guarding.   Musculoskeletal: She exhibits no edema.   Neurological: She is alert and oriented to person, place, and time. Coordination normal.   Skin: Skin is warm. No rash noted. She is not diaphoretic. No erythema.   Nursing note and vitals reviewed.      Meds:    Current Facility-Administered Medications:   •  oxyCODONE immediate-release  •  acetaminophen  •  magnesium chloride  •  levoFLOXacin  •  hyoscyamine-maalox plus-lidocaine viscous  •  riFAMPin  •  thrombin  •  temazepam  •  potassium chloride SA  •  losartan  •  pramipexole  •  senna-docusate  •  gabapentin  •  ferrous sulfate  •  diphenhydrAMINE  •  heparin  •  pyridoxine  •   levETIRAcetam  •  isoniazid  •  ethambutol  •  pyrazinamide  •  Pharmacy Consult Request  •  labetalol  •  lactobacillus rhamnosus  •  lidocaine  •  cyclobenzaprine  •  Respiratory Care per Protocol  •  amLODIPine  •  cinacalcet  •  atorvastatin  •  omeprazole  •  metoprolol  •  ondansetron  •  ondansetron    Labs:  No results for input(s): WBC, RBC, HEMOGLOBIN, HEMATOCRIT, MCV, MCH, RDW, PLATELETCT, MPV, NEUTSPOLYS, LYMPHOCYTES, MONOCYTES, EOSINOPHILS, BASOPHILS, RBCMORPHOLO in the last 72 hours.  No results for input(s): SODIUM, POTASSIUM, CHLORIDE, CO2, GLUCOSE, BUN, CPKTOTAL in the last 72 hours.  No results for input(s): ALBUMIN, TBILIRUBIN, ALKPHOSPHAT, TOTPROTEIN, ALTSGPT, ASTSGOT, CREATININE in the last 72 hours.    Imaging:  MRI on 7/14/2019  Impression:       1.  Innumerable nodular enhancing lesions throughout the brain parenchyma both the supra and infratentorial compartments predominantly near the gray-white junction. There has been interval increase in size of several of these lesions since previous exam.   These changes may be secondary to metastatic disease or granulomatous disease.    2.  Interval right parietal craniotomy with underlying elliptical postsurgical defect.    3.  Interval increase in size of index right thalamic lesion which has increased vasogenic edema since previous exam.    4.  Age-related cerebral atrophy.       Micro:  Results     Procedure Component Value Units Date/Time    Fungal Culture [[id number]] Collected:  07/11/19 1745    Order Status:  Completed Specimen:  Tissue Updated:  08/02/19 1104     Significant Indicator NEG     Source TISS     Site Right Hip Cores     Culture Result No fungal growth to date.    Narrative:       CALL  Mckeon  TEREZA tel. [phone],  CALLED  TEREZA tel. [phone] 08/02/2019, 11:03, RB PERF. RESULTS CALLED  TO:[id number].    AFB Culture [[id number]]  (Abnormal) Collected:  07/11/19 1745    Order Status:  Completed Specimen:  Tissue Updated:  08/02/19 1104   "    Significant Indicator POS     Source TISS     Site Right Hip Cores     Culture Result Culture in progress.  Acid fast bacilli detected by MGIT 960 instrument.       AFB Smear Results No acid fast bacilli seen.    Narrative:       CALL  Mckeon  TEREZA tel. [phone],  CALLED  TEREZA tel. [phone] 08/02/2019, 11:03, RB PERF. RESULTS CALLED  TO:[phone].          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 Decrease in the size of the lesions. Interval reduction in the extent of the surrounding white matter edema consistent with improvement/treatment response of the most of the multifocal brain abscesses.  MRI on 6/8 with interval progression of supra and infratentorial intra--axial nodules and associated edema.  New right thalamus lesion. Radiology favors infection over neoplasm (had been off abx)  Mult blood cultures neg; CHAS neg 3/15 and 5/24  MRI 6/22 worsening CNS lesions  S/p right craniotomy and resection of mass with biopsy on 6/28. Biopsy-per note fungal appearing elements seen per Neurosurgery-path   +necrotizing granulomas. All stains negative in EPIC  Fungal culture- negative to date  Bacterial cultures-negative to date  Brucella ab - negative  Coxiella ab - negative   Histo ab serum & antigen urine-negative  Blasto ab - negative   Repeat MRI on 7/14 + increase in in nodular enhancing lesions throughout the brain parenchyma  See below  Continue RIPE+B6 for 2 months (through 9/2/2019) followed by Rifamate with B6 for an additional 7-12 months  Follow-up brain biopsy specimen sent to [facility] for PCR testing, bacterial, fungal, AFB   Repeat MRI mid " "August     Gluteal and iliopsoas abscess, on treatment  OM L5, 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 The collection within the right gluteal muscle has unchanged while the fluid collection within the right iliacis muscle is increased somewhat in size.\" 2.7 cm  Cultures 3/29 and 4/4 neg  MRI on 5/20/2019 - interval decrease in collection in R gluteal muscle and persistent multiloculated collection in R iliacus muscle.   CT 5/28 no change  s/p drainage on 5/30/2019-cultures negative  S/p removal hardware 6/5-no cultures done  CT on 6/8 with residual abscess in the right iliacus muscle, 2.5 x 1.8 cm, slightly smaller than prior  s/p CT-guided deep bone biopsy 6/19/2019.  Cx +Mtb (probe positive, culture pending)  Hip core +AFB  MRI 6/28 chronic discitis, diffuse OM L5, 3 and 4 cm abscesses right glute, 1.5 cm abscess or necrosis right posterior ileum, 3.3 cm abscess right SI joint  Repeat MRI mid August along with brain as above  Continue TB therapy as above with levofloxacin per [facility] center recommendations     Monitoring  Will check CMP in a.m.  Monitor for visual changes while on ethambutol    Ophthalmology evaluation for baseline vision as well as color vision testing   Monitor closely for adverse effect secondary to fluoroquinolones since the plan is to use levofloxacin for several months    Type 2 DM, chronic  Hemoglobin A1c 6.3   Will adversely affect wound healing and resolution of infection    Will follow up at [facility] for meds and case management after discharge    ID will follow.  Please call with questions.                    "

## 2019-08-05 NOTE — THERAPY
"Physical Therapy Treatment completed.   Bed Mobility:  Supine to Sit: Supervised  Transfers: Sit to Stand: Minimal Assist  Gait: Level Of Assist: Supervised with Front-Wheel Walker       Plan of Care: Will benefit from Physical Therapy 3 times per week   Discharge Recommendations: Equipment: Will Continue to Assess for Equipment Needs. Post-acute therapy Discharge to a transitional care facility for continued skilled therapy services.    Upon arrival for PT session, pt on bedpan but had not pushed call light or alerted RN staff of need to get off of bed pan. Pt willing to cooperate with PT today. Pt completed supine to sit with HOB flat with use of rail. Upon sitting, pt had no complaints of pain. Pt ambulated around unit with FWW, SPV. Pt noted to have decreased ankle dorsiflexion on the R and tends to land on ball of R foot. Limited heel strike and toe off throughout gait cycle on the R. Gait was not significantly antalgic and pain did not seem to be a limiting factor today. Pt returned to supine with minimal assist for management of LE's. PT then started crying in supine. Pt did not report any pain but rather asking to speak with case management about \"signing the paper.\" Pt also concerned about when she is \"getting out of here.\" Pt continues to make steady progress towards meeting goals. Will continue to follow while in the acute care setting and pt continues to be  Appropriate for post acute transitional care    See \"Rehab Therapy-Acute\" Patient Summary Report for complete documentation.       "

## 2019-08-05 NOTE — PROGRESS NOTES
[facility] Medicine Daily Progress Note    Date of Service  8/5/2019    Chief Complaint  68 y.o. female admitted 5/29/2019 with right iliac and gluteus abscess    Hospital Course  Patient is a 68 year old with history of breast cancer, copd, dm, gerd, dl and htn who was admitted with right iliac gluteus abscess.  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    Interval Problem Update  8/2  Patient is complaining of worsening of pain in the hip on the left side, especially when she is sitting on the toilet.  I will order left hip and pelvic x-ray  I started her on scheduled Tylenol  8/3  Patient in no distress.  Denies any change in the left buttock pain, which she feels only when she is sitting on a toilet  Vital stable  8/4  Sleeping comfortably in the bed.  In no acute distress.  No overnight events   8/5  In no acute distress.  Vital stable.  No overnight events  Blood pressure stable  Consultants/Specialty  ID  Neurosurgery  Ortho    Code Status  DNR/DNI    Disposition  snf pending, would like to go to snf near family in [place]      Review of Systems  Review of Systems   Constitutional: Negative for chills, fever and weight loss.   HENT: Negative for ear pain, hearing loss and tinnitus.    Eyes: Negative for blurred vision, double vision and photophobia.   Respiratory: Negative for cough, hemoptysis and sputum production.    Cardiovascular: Negative for chest pain, palpitations and orthopnea.   Gastrointestinal: Negative for heartburn, nausea and vomiting.   Genitourinary: Negative for dysuria, flank pain, frequency and hematuria.   Musculoskeletal: Positive for joint pain. Negative for back pain and neck pain.   Skin: Negative for itching and rash.   Neurological: Negative for tremors, speech change, focal weakness and headaches.   Endo/Heme/Allergies: Negative for environmental allergies and  polydipsia. Does not bruise/bleed easily.   Psychiatric/Behavioral: Negative for hallucinations and substance abuse. The patient is not nervous/anxious.         Physical Exam  Temp:  [36.1 °C (97 °F)-36.4 °C (97.6 °F)] 36.4 °C (97.6 °F)  Pulse:  [79-94] 84  Resp:  [16-18] 18  BP: (103-136)/(50-69) 117/58  SpO2:  [95 %-98 %] 95 %    Physical Exam   Constitutional: She is oriented to person, place, and time. She appears well-developed and well-nourished.   HENT:   Head: Normocephalic and atraumatic.   Eyes: Pupils are equal, round, and reactive to light. EOM are normal.   Neck: Normal range of motion. Neck supple.   Cardiovascular: Normal rate, regular rhythm and normal heart sounds.   Pulmonary/Chest: Effort normal and breath sounds normal.   Abdominal: Soft. Bowel sounds are normal.   Musculoskeletal: Normal range of motion.   Tenderness to palpation  Of the left buttock   Neurological: She is alert and oriented to person, place, and time.   Skin: Skin is warm and dry.   Psychiatric: She has a normal mood and affect. Cognition and memory are impaired.   Nursing note and vitals reviewed.    Examined the patient on 8/5  No significant changes from 8/4 except as documented  Fluids  No intake or output data in the 24 hours ending 08/05/19 0807    Laboratory                        Imaging  DX-HIP-UNILATERAL-WITH PELVIS-1 VIEW LEFT   Final Result      No LEFT hip or pelvic fracture.      IR-INJ-EXT PSEUDOANEURYSM PERC   Final Result      1.  Ultrasound guided thrombin injection for treatment of a RIGHT gluteal 3.6 cm pseudoaneurysm.      US-EXTREMITY ARTERY LOWER UNILAT RIGHT   Final Result      Right gluteal pseudoaneurysm measuring 3.65 x 2.24 x 2.31 cm.      CTA ABDOMEN PELVIS W & W/O POST PROCESS   Final Result      1.  Interval enlargement of a hyperdense ovoid masslike structure immediately posterior to the right ilium, possibly representing a pseudoaneurysm.   2.  Stable thin-walled apparent fluid-filled structure  immediately deep to the right ilium.   3.   Minimal retroperitoneal adenopathy, grossly stable.   4.  Cholelithiasis.      MR-BRAIN-WITH & W/O   Final Result      1.  Innumerable nodular enhancing lesions throughout the brain parenchyma both the supra and infratentorial compartments predominantly near the gray-white junction. There has been interval increase in size of several of these lesions since previous exam.    These changes may be secondary to metastatic disease or granulomatous disease.      2.  Interval right parietal craniotomy with underlying elliptical postsurgical defect.      3.  Interval increase in size of index right thalamic lesion which has increased vasogenic edema since previous exam.      4.  Age-related cerebral atrophy.      IR-PICC LINE PLACEMENT W/ GUIDANCE > AGE 5   Final Result                  Ultrasound-guided PICC placement performed by qualified nursing staff as    above.          RT-LOCHZTA-7 VIEW   Final Result         1.  Moderate stool in the colon suggests changes of constipation, otherwise nonspecific bowel gas pattern   2.  Atherosclerosis      CT-NEEDLE BX-ABD-RETROPERITONL   Final Result      1.  CT GUIDED biopsy of a right pelvic phlegmon.      2. Significant interval enlargement of a right gluteal pseudoaneurysm. CTA and consideration for possible intervention either with direct thrombin injection or endovascular treatment was suggested. This was conveyed to Dr. Alatorre on 7/11/2019 via [place]    text at 1750 hours.      US-EXTREMITY VENOUS LOWER BILAT   Final Result      IR-US GUIDED PIV   Final Result    Ultrasound-guided PERIPHERAL IV INSERTION performed by    qualified nursing staff as above.            MR-LUMBAR SPINE-WITH & W/O   Final Result         1.  Grade 2-3 spondylolisthesis at the L5-S1 level with bilateral spondylolysis of the pars interarticularis. This causes severe bilateral neural foraminal stenosis at this level.      2.  Interval removal of posterior  fusion hardware at the lumbosacral junction.      3.  Diffuse osteomyelitis involving the L5 vertebral body and the first 3 sacral segments.      4.  Chronic discitis at the L5-S1 level with anterior paraspinous abscess at this level and anterior to the S1 sacral segment.      5.  Smaller intraosseous bone bone abscesses or areas of necrosis noted in the sacrum.      6.  There is also evidence of osteomyelitis involving the jose antonio and sacrum bilaterally along the course of the previous sacral fixation screw. There is a large 4 cm abscess noted in the right gluteal soft tissues in a smaller 3 cm chronic appearing    abscess in the left gluteal musculature.      7.  There is a 1.5 cm intraosseous abscess or area of necrosis in the right posterior ilium.      8.  There is a 3.3 cm centimeters multiloculated abscess anterior to the right sacroiliac joint.      MR-STEALTH BRAIN WITH & W/O   Final Result         1.  Innumerable subcentimeter brain metastases, many at the gray-white junction but also multiple noted throughout the basal ganglia and brainstem.      2.  Largest index lesion is noted in the right thalamus and has increased in size since previous exam and currently measures 9 mm and has a moderate amount of surrounding vasogenic edema.      US-EXTREMITY NON VASCULAR UNILATERAL RIGHT   Final Result      No right hip joint effusion. No soft tissue fluid collection.      MR-BRAIN-WITH & W/O   Final Result      1.  Innumerable supra and infratentorial enhancing nodules are again noted throughout the brain parenchyma with multiple lesions appearing somewhat larger and with increased enhancement. No associated diffusion restriction. Unchanged differential    considerations including bacterial or fungal infection versus metastatic disease.   2.  Mild diffuse cerebral substance loss.   3.  Mild microangiopathic ischemic change versus demyelination or gliosis.      CT-NEEDLE BX-DEEP BONE   Final Result      CT GUIDED  RIGHT ILIUM DEEP BONE BIOPSY. SAMPLES WERE SENT TO MICROBIOLOGY FOR ANALYSIS.      CT-PELVIS WITH   Final Result         1.  No significant interval change in the size of the RIGHT iliac muscle fluid collections   2.  Hyperdense RIGHT gluteal lesion moderately suspicious for pseudoaneurysm with adjacent hematoma.   3.  Changes in the RIGHT iliac bone, BILATERAL sacrum and LEFT iliac bone suspicious for osteomyelitis   4.  Changes at L5-S1 suspicious for discitis osteomyelitis      CT-PELVIS WITH   Final Result      1.  Residual or recurrent abscess within the right iliacus muscle measuring 2.5 x 1.8 cm. It slightly smaller than on 5/28/2019      2.  Interval removal of hardware from the iliac bones and sacrum      3.  Lytic change of the right iliac bone and the sacrum. This could be related to hardware versus osteomyelitis.      4.  Subacute fractures of the right ilium, sacrum, and there is lucency within the left iliac bone that is likely from hardware      MR-BRAIN-WITH & W/O   Final Result      1.  Interval progression of supra and infratentorial intra-axial nodules and associated edema. This short-term interval progression favors infection over neoplasm though both remain considerations.   2.  Mild atrophy      DX-PORTABLE FLUOROSCOPY < 1 HOUR   Final Result         Portable fluoroscopy utilized for 2 minutes.      DX-PELVIS-1 OR 2 VIEWS   Final Result      Status post hardware removal from the right SI joint      DX-CHEST-PORTABLE (1 VIEW)   Final Result      Interstitial prominence could be due to pulmonary edema or hypoventilatory change.      DX-CHEST-LIMITED (1 VIEW)   Final Result      LEFT basilar underinflation atelectasis or pneumonia      CT-DRAIN-HEMATOMA - SEROMA   Final Result      CT-guided RIGHT pelvic fluid collection aspiration, laboratory evaluation pending              Assessment/Plan  * Brain lesion- (present on admission)  Assessment & Plan  Right-sided craniotomy for resection of  superficial mass  6/28/2019  Continue Isoniazid, Rifampin, Ethambutol, Pyrazinamide per ID, discussed with ID they have reached out to [facility] to discuss outpatient regimen  Continue Keppra for seizure prophylaxis, per ID consider restarting steroids if she has breakthrough seizures  No seizures observed    Hypokalemia- (present on admission)  Assessment & Plan  Replacing  following    Osteomyelitis (HCC)- (present on admission)  Assessment & Plan  Not osteo- actually disseminated TB  ID following    Granulomatous disease - (present on admission)  Assessment & Plan  Isoniazid, Rifampin, Ethambutol, Pyrazinamide    Pseudoaneurysm following procedure (HCC)- (present on admission)  Assessment & Plan    POD #5 s/p IR thrombin injection for thrombosed psedudoanerysm    Non-severe protein-calorie malnutrition (HCC)- (present on admission)  Assessment & Plan  Nutrition consult      Sepsis (HCC)- (present on admission)  Assessment & Plan  This is sepsis (without associated acute organ dysfunction).    Source - Right Iliac and Gluteus medius abscess  Resolved    Hypomagnesemia- (present on admission)  Assessment & Plan  Oral magnesium    Abscess of right iliac muscle and right gluteus medius muscle- (present on admission)  Assessment & Plan  CT guided pelvic aspiration 5/30/2019  Hardware removal, pelvis 6/5/2019  CT guided deep bone biopsy 6/19/2019  CT guided aspiration/biopsy of a R gluteal fluid collection/phlegmon 7/11/2019 - consistent with TB  Continue Isoniazid, Rifampin, Ethambutol, Pyrazinamide  Associated pain improving  Levaquin to continue for 9-12 months -- recommended by [facility] and ID      Dysphagia- (present on admission)  Assessment & Plan  Dysphagia 3  Speech following    History of DVT (deep vein thrombosis)- (present on admission)  Assessment & Plan  Xarelto was held due to hip hematoma and rifampin  Consider starting coumadin in 1  weeks if h/h remains stable    Essential hypertension-  (present on admission)  Assessment & Plan  Continue Metoprolol, Losartan and Amlodipine as tolerated  Blood pressure stable    RLS (restless legs syndrome)- (present on admission)  Assessment & Plan  Pramipexole    Chronic pain- (present on admission)  Assessment & Plan    Opiates were causing confusion   Has now been tapered off long acting opiates and doing well  8/2 complaining of worsening of left-sided hip/pelvic pain.  x-ray of left femur/pelvis pending  Tylenol scheduled started    History of breast cancer- (present on admission)  Assessment & Plan  history left mastectomy and breast implant.      COPD (chronic obstructive pulmonary disease) (HCC)- (present on admission)  Assessment & Plan  Continue RT protocol  No s/o exacerbation       VTE prophylaxis: Heparin

## 2019-08-06 PROBLEM — E87.6 HYPOKALEMIA: Status: RESOLVED | Noted: 2019-07-17 | Resolved: 2019-08-06

## 2019-08-06 PROBLEM — A41.9 SEPSIS (HCC): Status: RESOLVED | Noted: 2019-06-04 | Resolved: 2019-08-06

## 2019-08-06 LAB
ALBUMIN SERPL BCP-MCNC: 3.5 G/DL (ref 3.2–4.9)
ALBUMIN/GLOB SERPL: 1.2 G/DL
ALP SERPL-CCNC: 162 U/L (ref 30–99)
ALT SERPL-CCNC: 9 U/L (ref 2–50)
ANION GAP SERPL CALC-SCNC: 7 MMOL/L (ref 0–11.9)
AST SERPL-CCNC: 28 U/L (ref 12–45)
BASOPHILS # BLD AUTO: 1 % (ref 0–1.8)
BASOPHILS # BLD: 0.03 K/UL (ref 0–0.12)
BILIRUB SERPL-MCNC: 0.3 MG/DL (ref 0.1–1.5)
BUN SERPL-MCNC: 23 MG/DL (ref 8–22)
CALCIUM SERPL-MCNC: 9.9 MG/DL (ref 8.5–10.5)
CHLORIDE SERPL-SCNC: 109 MMOL/L (ref 96–112)
CO2 SERPL-SCNC: 23 MMOL/L (ref 20–33)
CREAT SERPL-MCNC: 0.68 MG/DL (ref 0.5–1.4)
EOSINOPHIL # BLD AUTO: 0.18 K/UL (ref 0–0.51)
EOSINOPHIL NFR BLD: 5.7 % (ref 0–6.9)
ERYTHROCYTE [DISTWIDTH] IN BLOOD BY AUTOMATED COUNT: 52.6 FL (ref 35.9–50)
FUNGUS SPEC CULT: NORMAL
GLOBULIN SER CALC-MCNC: 3 G/DL (ref 1.9–3.5)
GLUCOSE SERPL-MCNC: 97 MG/DL (ref 65–99)
HCT VFR BLD AUTO: 35.1 % (ref 37–47)
HGB BLD-MCNC: 10.8 G/DL (ref 12–16)
IMM GRANULOCYTES # BLD AUTO: 0.01 K/UL (ref 0–0.11)
IMM GRANULOCYTES NFR BLD AUTO: 0.3 % (ref 0–0.9)
LYMPHOCYTES # BLD AUTO: 1.2 K/UL (ref 1–4.8)
LYMPHOCYTES NFR BLD: 38.2 % (ref 22–41)
MCH RBC QN AUTO: 29.6 PG (ref 27–33)
MCHC RBC AUTO-ENTMCNC: 30.8 G/DL (ref 33.6–35)
MCV RBC AUTO: 96.2 FL (ref 81.4–97.8)
MONOCYTES # BLD AUTO: 0.46 K/UL (ref 0–0.85)
MONOCYTES NFR BLD AUTO: 14.6 % (ref 0–13.4)
NEUTROPHILS # BLD AUTO: 1.26 K/UL (ref 2–7.15)
NEUTROPHILS NFR BLD: 40.2 % (ref 44–72)
NRBC # BLD AUTO: 0 K/UL
NRBC BLD-RTO: 0 /100 WBC
PLATELET # BLD AUTO: 183 K/UL (ref 164–446)
PMV BLD AUTO: 9.4 FL (ref 9–12.9)
POTASSIUM SERPL-SCNC: 4.3 MMOL/L (ref 3.6–5.5)
PROT SERPL-MCNC: 6.5 G/DL (ref 6–8.2)
RBC # BLD AUTO: 3.65 M/UL (ref 4.2–5.4)
SIGNIFICANT IND 70042: NORMAL
SITE SITE: NORMAL
SODIUM SERPL-SCNC: 139 MMOL/L (ref 135–145)
SOURCE SOURCE: NORMAL
WBC # BLD AUTO: 3.1 K/UL (ref 4.8–10.8)

## 2019-08-06 PROCEDURE — A9270 NON-COVERED ITEM OR SERVICE: HCPCS | Performed by: INTERNAL MEDICINE

## 2019-08-06 PROCEDURE — 700102 HCHG RX REV CODE 250 W/ 637 OVERRIDE(OP): Performed by: INTERNAL MEDICINE

## 2019-08-06 PROCEDURE — 700111 HCHG RX REV CODE 636 W/ 250 OVERRIDE (IP): Performed by: INTERNAL MEDICINE

## 2019-08-06 PROCEDURE — 97530 THERAPEUTIC ACTIVITIES: CPT

## 2019-08-06 PROCEDURE — 700101 HCHG RX REV CODE 250: Performed by: FAMILY MEDICINE

## 2019-08-06 PROCEDURE — 700102 HCHG RX REV CODE 250 W/ 637 OVERRIDE(OP): Performed by: FAMILY MEDICINE

## 2019-08-06 PROCEDURE — 99233 SBSQ HOSP IP/OBS HIGH 50: CPT | Performed by: INTERNAL MEDICINE

## 2019-08-06 PROCEDURE — 770001 HCHG ROOM/CARE - MED/SURG/GYN PRIV*

## 2019-08-06 PROCEDURE — 36415 COLL VENOUS BLD VENIPUNCTURE: CPT

## 2019-08-06 PROCEDURE — 99231 SBSQ HOSP IP/OBS SF/LOW 25: CPT | Performed by: INTERNAL MEDICINE

## 2019-08-06 PROCEDURE — A9270 NON-COVERED ITEM OR SERVICE: HCPCS | Performed by: FAMILY MEDICINE

## 2019-08-06 PROCEDURE — 85025 COMPLETE CBC W/AUTO DIFF WBC: CPT

## 2019-08-06 PROCEDURE — 80053 COMPREHEN METABOLIC PANEL: CPT

## 2019-08-06 RX ADMIN — MAGNESIUM 64 MG (MAGNESIUM CHLORIDE) TABLET,DELAYED RELEASE 64 MG: at 06:30

## 2019-08-06 RX ADMIN — SENNOSIDES, DOCUSATE SODIUM 2 TABLET: 50; 8.6 TABLET, FILM COATED ORAL at 06:28

## 2019-08-06 RX ADMIN — LIDOCAINE 2 PATCH: 50 PATCH CUTANEOUS at 12:26

## 2019-08-06 RX ADMIN — TEMAZEPAM 15 MG: 15 CAPSULE ORAL at 23:01

## 2019-08-06 RX ADMIN — POTASSIUM CHLORIDE 20 MEQ: 20 TABLET, EXTENDED RELEASE ORAL at 06:29

## 2019-08-06 RX ADMIN — LEVETIRACETAM 500 MG: 500 TABLET, FILM COATED ORAL at 06:28

## 2019-08-06 RX ADMIN — PYRAZINAMIDE 1000 MG: 500 TABLET ORAL at 06:31

## 2019-08-06 RX ADMIN — HEPARIN SODIUM 5000 UNITS: 5000 INJECTION, SOLUTION INTRAVENOUS; SUBCUTANEOUS at 06:29

## 2019-08-06 RX ADMIN — LOSARTAN POTASSIUM 75 MG: 50 TABLET ORAL at 06:30

## 2019-08-06 RX ADMIN — CINACALCET HYDROCHLORIDE 60 MG: 30 TABLET, COATED ORAL at 06:31

## 2019-08-06 RX ADMIN — OMEPRAZOLE 20 MG: 20 CAPSULE, DELAYED RELEASE ORAL at 06:30

## 2019-08-06 RX ADMIN — MAGNESIUM 64 MG (MAGNESIUM CHLORIDE) TABLET,DELAYED RELEASE 64 MG: at 18:03

## 2019-08-06 RX ADMIN — AMLODIPINE BESYLATE 10 MG: 5 TABLET ORAL at 06:28

## 2019-08-06 RX ADMIN — HEPARIN SODIUM 5000 UNITS: 5000 INJECTION, SOLUTION INTRAVENOUS; SUBCUTANEOUS at 18:03

## 2019-08-06 RX ADMIN — LEVOFLOXACIN 750 MG: 750 TABLET, FILM COATED ORAL at 06:29

## 2019-08-06 RX ADMIN — SENNOSIDES, DOCUSATE SODIUM 2 TABLET: 50; 8.6 TABLET, FILM COATED ORAL at 18:03

## 2019-08-06 RX ADMIN — ACETAMINOPHEN 1000 MG: 500 TABLET ORAL at 06:29

## 2019-08-06 RX ADMIN — ACETAMINOPHEN 1000 MG: 500 TABLET ORAL at 18:03

## 2019-08-06 RX ADMIN — FERROUS SULFATE TAB 325 MG (65 MG ELEMENTAL FE) 325 MG: 325 (65 FE) TAB at 06:29

## 2019-08-06 RX ADMIN — OXYCODONE HYDROCHLORIDE 5 MG: 5 TABLET ORAL at 09:24

## 2019-08-06 RX ADMIN — ATORVASTATIN CALCIUM 10 MG: 10 TABLET, FILM COATED ORAL at 18:03

## 2019-08-06 RX ADMIN — LEVETIRACETAM 500 MG: 500 TABLET, FILM COATED ORAL at 18:03

## 2019-08-06 RX ADMIN — PRAMIPEXOLE DIHYDROCHLORIDE 0.25 MG: 0.5 TABLET ORAL at 06:31

## 2019-08-06 RX ADMIN — PYRIDOXINE HCL TAB 50 MG 100 MG: 50 TAB at 06:29

## 2019-08-06 RX ADMIN — RIFAMPIN 600 MG: 300 CAPSULE ORAL at 06:31

## 2019-08-06 RX ADMIN — PRAMIPEXOLE DIHYDROCHLORIDE 0.25 MG: 0.5 TABLET ORAL at 23:01

## 2019-08-06 RX ADMIN — METOPROLOL TARTRATE 25 MG: 25 TABLET, FILM COATED ORAL at 06:30

## 2019-08-06 RX ADMIN — PRAMIPEXOLE DIHYDROCHLORIDE 0.25 MG: 0.5 TABLET ORAL at 15:33

## 2019-08-06 RX ADMIN — OXYCODONE HYDROCHLORIDE 5 MG: 5 TABLET ORAL at 18:03

## 2019-08-06 RX ADMIN — Medication 1 CAPSULE: at 06:30

## 2019-08-06 RX ADMIN — METOPROLOL TARTRATE 25 MG: 25 TABLET, FILM COATED ORAL at 18:03

## 2019-08-06 RX ADMIN — GABAPENTIN 400 MG: 400 CAPSULE ORAL at 12:26

## 2019-08-06 RX ADMIN — ISONIAZID 300 MG: 300 TABLET ORAL at 06:28

## 2019-08-06 RX ADMIN — GABAPENTIN 400 MG: 400 CAPSULE ORAL at 06:28

## 2019-08-06 RX ADMIN — ACETAMINOPHEN 1000 MG: 500 TABLET ORAL at 12:26

## 2019-08-06 RX ADMIN — GABAPENTIN 400 MG: 400 CAPSULE ORAL at 18:02

## 2019-08-06 RX ADMIN — ETHAMBUTOL HYDROCHLORIDE 800 MG: 400 TABLET, FILM COATED ORAL at 06:45

## 2019-08-06 ASSESSMENT — ENCOUNTER SYMPTOMS
HEARTBURN: 0
POLYDIPSIA: 0
PALPITATIONS: 0
DIZZINESS: 0
FOCAL WEAKNESS: 0
FLANK PAIN: 0
FEVER: 0
SHORTNESS OF BREATH: 0
HALLUCINATIONS: 0
SPEECH CHANGE: 0
TREMORS: 0
SPUTUM PRODUCTION: 0
BLURRED VISION: 0
ABDOMINAL PAIN: 0
CHILLS: 0
PHOTOPHOBIA: 0
BRUISES/BLEEDS EASILY: 0
WEIGHT LOSS: 0
HEMOPTYSIS: 0
DIARRHEA: 0
VOMITING: 0
COUGH: 0
NECK PAIN: 0
DOUBLE VISION: 0
HEADACHES: 0
ORTHOPNEA: 0
BACK PAIN: 0
NERVOUS/ANXIOUS: 0
NAUSEA: 0

## 2019-08-06 ASSESSMENT — COGNITIVE AND FUNCTIONAL STATUS - GENERAL
SUGGESTED CMS G CODE MODIFIER DAILY ACTIVITY: CJ
TOILETING: A LITTLE
DRESSING REGULAR LOWER BODY CLOTHING: A LITTLE
HELP NEEDED FOR BATHING: A LITTLE
DAILY ACTIVITIY SCORE: 21

## 2019-08-06 ASSESSMENT — PAIN SCALES - WONG BAKER: WONGBAKER_NUMERICALRESPONSE: HURTS JUST A LITTLE BIT

## 2019-08-06 ASSESSMENT — LIFESTYLE VARIABLES: SUBSTANCE_ABUSE: 0

## 2019-08-06 NOTE — PROGRESS NOTES
Patient slept well throughout the night. Patient incontinent of urine x 2. Patient requesting to speak with . No other requests at this time, will continue to monitor.

## 2019-08-06 NOTE — DISCHARGE PLANNING
Agency/Facility Name: [facility]  Spoke To: Robert(p: [phone])  Outcome: Received call from Robert requesting to speak to someone clinical. [place]([facility]) notified. Contact information for Robert provided.

## 2019-08-06 NOTE — DISCHARGE PLANNING
Anticipated Discharge Disposition: TBD    Action: LSW attempted to call bedside Katherin NGUYEN to provide number for Robert with [facility] ([phone]) who wanted to speak to someone clinical regarding pt. Katherin NGUYEN not available at this time.     Barriers to Discharge: None    Plan: LSW to f/u with Katherin NGUYEN at a different time.     Addendum 1315  LSW provided Katherin NGUYEN's number ([phone]). Katherin NGUYEN stated that she had to leave a .

## 2019-08-06 NOTE — PROGRESS NOTES
Infectious Disease Progress Note    Author: Lito Burrows M.D. Date & Time of service: 8/6/2019  10:02 AM    Chief Complaint:  Brain abscess, gluteal abscess  Vertebral OM     Interval History:  68 y.o. female admitted 5/29/2019 as a transfer from [facility] since 4/30/2019. + diabetes, SANTOSH of right hip with hardware, and breast cancer who was originally admitted to [facility] on 03/08/2019 for right hip and pelvic pain.  Work-up revealed a right iliopsoas lesion, gluteal abscess, and mult brain abscesses     7/4 AF much more alert and conversant today-c/o left hip pain, unrelenting and would like parietal dressing changed. HA at surgical site.  Denies SE abx. No new neurodeficits  7/5 afebrile WBC 5.4.  Patient sleeping but arousable.  She denies any headache, nausea, vomiting.  7/6 afebrile WBC 6.5.  Patient sitting up in chair and having excruciating back pain  7/8 Pt has no specific complaints, she is mildly confused today.  She seems to be tolerating her medications with no significant lab abnormalities.  7/9 AF, O2 RA, Significant left hip pain today.  She does appear to be tolerating her antibiotics.  Ultrasound of bilateral lower extremities has been ordered.  7/10 AF, O2 RA,  Plan for IR drainage of sacral collection soon.   7/11 AF, O2 RA,  Pt continued on RIPE and ampho.  She is tolerating well overall, requiring some mag and potassium replacement.  She is complaining of severe pain in left hip again today.  Plan for biopsy later today.   7/12  IR biopsy of R gluteal abscess/hip. AF, O2 2 L NC,   tolerating antibiotics so far.  She is quite somnolent today but per nursing she was oriented x4 earlier  7/14 AF, O2 RA, more alert today, conversant and asking for advance in diet. Left hip pain ongoing but improved.   7/15 afebrile WBC 4.4.  Patient's speech is somewhat muffled but she is alert and responding to questions appropriately.  MRI shows worsening lesions and amphotericin stopped.  Nurse reports  waxing and waning mental status  7/16 afebrile.  Patient sleeping but arousable.  She is answer questions appropriately but then forgets that she is in the hospital.  7/17 afebrile WBC 4.9 patient is very drowsy as she states she did not sleep well yesterday.  She is asking about whether or not she has a fungal infection.  Plan of care discussed with patient.  7/19 afebrile patient continues to only endorse left hip pain exacerbated by sitting up in the chair.  She sat in the chair for 30 minutes yesterday.  Getting out of bed is limited secondary to left hip pain.  Mental status remains about the same with intermittent waxing and waning.  No new issues today per RN  7/21 afebrile patient is much more awake and alert today.  She is answering questions appropriately.  Yesterday she was complaining of some abdominal pain so CT scan was done and revealed interval enlargement of a hyperdense ovoid masslike structure posterior to the right ilium concerning for pseudoaneurysm.  7/22/2019 no fevers.  No new issues overnight.  Had her ultrasound today follow the results  7/23/2019-no fevers.  Ongoing pain.  The AFB cultures have come back positive for TB  7/24/2019 no fevers.  No new issues overnight.  Very anxious and agitated  7/25/2019 no fevers.  No new issues.  Says she feels slightly better.  7/26 AF WBC 4.5 tolerating meds well-wants cream to decrease size of surgical scar-has right-sided rib pain and joint pain  7/27 AF ambulating with assist with walker-no new complaints  7/28 AF same pain complaint-remains debilitated with intermittent confusion  7/29 AF WBC 4.2 no clinical change  7/30 AF WBC 3.3 sleeping sounding-dw RN has been complaining of hip pain but able to ambulate  7/31 AF WBC 3 ambulating in peng with rolling walker-NAD. No new complaints  8/1 AF knitting in bed-looks comfortable but requesting more pain meds  8/2 AF No CBC no new complaints  8/3 AF tolerating PO-no rash Same pain  8/5 afebrile, no CBC,  continues to report pain, no new medications, tolerating antibiotics.  8/6 afebrile, white count 3.1.  No new issues, tolerating antibiotics.    Review of Systems:  Review of Systems   Constitutional: Negative for chills and fever.   Respiratory: Negative for cough and shortness of breath.    Cardiovascular: Negative for chest pain.   Gastrointestinal: Negative for abdominal pain, diarrhea, nausea and vomiting.   Genitourinary: Negative for dysuria.   Musculoskeletal: Positive for joint pain.   Skin: Negative for itching and rash.   Neurological: Negative for dizziness.   All other systems reviewed and are negative.      Hemodynamics:  Temp (24hrs), Av.5 °C (97.7 °F), Min:36.1 °C (97 °F), Max:36.9 °C (98.5 °F)  Temperature: 36.7 °C (98 °F)  Pulse  Av.3  Min: 49  Max: 195   Blood Pressure : 110/62       Physical Exam:  Physical Exam   Constitutional: She is oriented to person, place, and time. No distress.   Elderly  Chronically ill-appearing  Sitting up in chair, reading a book   HENT:   Mouth/Throat: Oropharynx is clear and moist. No oropharyngeal exudate.   Right parietal surgical site healed   Eyes: Right eye exhibits no discharge. Left eye exhibits no discharge.   Neck: Neck supple. No JVD present.   Cardiovascular: Normal rate and regular rhythm.   Pulmonary/Chest: Effort normal. No stridor. She has no wheezes. She has no rales.   Abdominal: Soft. There is no rebound and no guarding.   Musculoskeletal: She exhibits no edema.   Neurological: She is alert and oriented to person, place, and time. Coordination normal.   Skin: Skin is warm. No rash noted. She is not diaphoretic. No erythema.   Nursing note and vitals reviewed.      Meds:    Current Facility-Administered Medications:   •  oxyCODONE immediate-release  •  acetaminophen  •  magnesium chloride  •  levoFLOXacin  •  hyoscyamine-maalox plus-lidocaine viscous  •  riFAMPin  •  thrombin  •  temazepam  •  potassium chloride SA  •  losartan  •   pramipexole  •  senna-docusate  •  gabapentin  •  ferrous sulfate  •  diphenhydrAMINE  •  heparin  •  pyridoxine  •  levETIRAcetam  •  isoniazid  •  ethambutol  •  pyrazinamide  •  Pharmacy Consult Request  •  labetalol  •  lactobacillus rhamnosus  •  lidocaine  •  cyclobenzaprine  •  Respiratory Care per Protocol  •  amLODIPine  •  cinacalcet  •  atorvastatin  •  omeprazole  •  metoprolol  •  ondansetron  •  ondansetron    Labs:  Recent Labs     08/06/19  0342   WBC 3.1*   RBC 3.65*   HEMOGLOBIN 10.8*   HEMATOCRIT 35.1*   MCV 96.2   MCH 29.6   RDW 52.6*   PLATELETCT 183   MPV 9.4   NEUTSPOLYS 40.20*   LYMPHOCYTES 38.20   MONOCYTES 14.60*   EOSINOPHILS 5.70   BASOPHILS 1.00     No results for input(s): SODIUM, POTASSIUM, CHLORIDE, CO2, GLUCOSE, BUN, CPKTOTAL in the last 72 hours.  No results for input(s): ALBUMIN, TBILIRUBIN, ALKPHOSPHAT, TOTPROTEIN, ALTSGPT, ASTSGOT, CREATININE in the last 72 hours.    Imaging:  MRI on 7/14/2019  Impression:       1.  Innumerable nodular enhancing lesions throughout the brain parenchyma both the supra and infratentorial compartments predominantly near the gray-white junction. There has been interval increase in size of several of these lesions since previous exam.   These changes may be secondary to metastatic disease or granulomatous disease.    2.  Interval right parietal craniotomy with underlying elliptical postsurgical defect.    3.  Interval increase in size of index right thalamic lesion which has increased vasogenic edema since previous exam.    4.  Age-related cerebral atrophy.       Micro:  Results     Procedure Component Value Units Date/Time    Fungal Culture [[id number]] Collected:  07/11/19 1745    Order Status:  Completed Specimen:  Tissue Updated:  08/06/19 0656     Significant Indicator NEG     Source TISS     Site Right Hip Cores     Culture Result No fungal growth.    Narrative:       CALL  Mckeon  TEREZA tel. [phone],  CALLED  TEREZA tel. [phone] 08/02/2019, 11:03,  "RB PERF. RESULTS CALLED  TO:[id number].    AFB Culture [[id number]]  (Abnormal) Collected:  07/11/19 3269    Order Status:  Completed Specimen:  Tissue Updated:  08/06/19 0656     Significant Indicator POS     Source TISS     Site Right Hip Cores     Culture Result Culture in progress.  Acid fast bacilli detected by MGIT 960 instrument.       AFB Smear Results No acid fast bacilli seen.    Narrative:       CALL  Mckeon  TEREZA tel. [phone],  CALLED  TEREZA tel. [phone] 08/02/2019, 11:03, RB PERF. RESULTS CALLED  TO:[id number].          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 Decrease in the size of the lesions. Interval reduction in the extent of the surrounding white matter edema consistent with improvement/treatment response of the most of the multifocal brain abscesses.  MRI on 6/8 with interval progression of supra and infratentorial intra--axial nodules and associated edema.  New right thalamus lesion. Radiology favors infection over neoplasm (had been off abx)  Mult blood cultures neg; CHAS neg 3/15 and 5/24  MRI 6/22 worsening CNS lesions  S/p right craniotomy and resection of mass with biopsy on 6/28. Biopsy-per note fungal appearing elements seen per Neurosurgery (patient was just briefly on IV amphotericin B, but this turned out to be an incorrect read)-path   +necrotizing granulomas. All stains negative in EPIC  Fungal culture- negative to date  Bacterial cultures-negative to date  Brucella ab - negative  Coxiella ab - negative   Histo ab serum & antigen urine-negative  Blasto ab - negative   Repeat MRI on 7/14 + increase in in nodular enhancing lesions throughout the " "brain parenchyma  See below.  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 The collection within the right gluteal muscle has unchanged while the fluid collection within the right iliacis muscle is increased somewhat in size.\" 2.7 cm  Cultures 3/29 and 4/4 neg  MRI on 5/20/2019 - interval decrease in collection in R gluteal muscle and persistent multiloculated collection in R iliacus muscle.   CT 5/28 no change  s/p drainage on 5/30/2019-cultures negative  S/p removal hardware 6/5-no cultures done  CT on 6/8 with residual abscess in the right iliacus muscle, 2.5 x 1.8 cm, slightly smaller than prior  s/p CT-guided deep bone biopsy 6/19/2019.  Cx +Mtb (probe positive, culture pending)  Hip core +AFB  MRI 6/28 chronic discitis, diffuse OM L5, 3 and 4 cm abscesses right glute, 1.5 cm abscess or necrosis right posterior ileum, 3.3 cm abscess right SI joint  IR biopsy of R gluteal abscess/hip core tissue performed on 7/11, also with probe positive for MTB, culture pending  Repeat MRI mid August along with brain as above  Continue TB therapy as above with levofloxacin per [facility] center recommendations     Monitoring  Will check CMP  Monitor for visual changes while on ethambutol    Ophthalmology evaluation for baseline vision as well as color vision testing -please check if this is possible while patient is inpatient  Monitor closely for adverse effect secondary to " fluoroquinolones since the plan is to use levofloxacin for several months    Type 2 DM, chronic  Hemoglobin A1c 6.3   Will adversely affect wound healing and resolution of infection    Needs follow up with [facility] for meds and case management after discharge -please discuss with CM if this is set up    Discussed with primary, Dr. Devi    ID will follow every few days.  Please call with questions.

## 2019-08-07 LAB
ALBUMIN SERPL BCP-MCNC: 3.4 G/DL (ref 3.2–4.9)
ALBUMIN/GLOB SERPL: 1.2 G/DL
ALP SERPL-CCNC: 197 U/L (ref 30–99)
ALT SERPL-CCNC: 10 U/L (ref 2–50)
ANION GAP SERPL CALC-SCNC: 7 MMOL/L (ref 0–11.9)
AST SERPL-CCNC: 20 U/L (ref 12–45)
BASOPHILS # BLD AUTO: 0.5 % (ref 0–1.8)
BASOPHILS # BLD: 0.02 K/UL (ref 0–0.12)
BILIRUB SERPL-MCNC: 0.2 MG/DL (ref 0.1–1.5)
BUN SERPL-MCNC: 28 MG/DL (ref 8–22)
CALCIUM SERPL-MCNC: 9.1 MG/DL (ref 8.5–10.5)
CHLORIDE SERPL-SCNC: 106 MMOL/L (ref 96–112)
CO2 SERPL-SCNC: 25 MMOL/L (ref 20–33)
CREAT SERPL-MCNC: 0.8 MG/DL (ref 0.5–1.4)
EOSINOPHIL # BLD AUTO: 0.21 K/UL (ref 0–0.51)
EOSINOPHIL NFR BLD: 5.5 % (ref 0–6.9)
ERYTHROCYTE [DISTWIDTH] IN BLOOD BY AUTOMATED COUNT: 51.7 FL (ref 35.9–50)
GLOBULIN SER CALC-MCNC: 2.8 G/DL (ref 1.9–3.5)
GLUCOSE SERPL-MCNC: 96 MG/DL (ref 65–99)
HCT VFR BLD AUTO: 32.9 % (ref 37–47)
HGB BLD-MCNC: 10.5 G/DL (ref 12–16)
IMM GRANULOCYTES # BLD AUTO: 0.01 K/UL (ref 0–0.11)
IMM GRANULOCYTES NFR BLD AUTO: 0.3 % (ref 0–0.9)
LYMPHOCYTES # BLD AUTO: 1.37 K/UL (ref 1–4.8)
LYMPHOCYTES NFR BLD: 35.8 % (ref 22–41)
MAGNESIUM SERPL-MCNC: 1.8 MG/DL (ref 1.5–2.5)
MCH RBC QN AUTO: 30.3 PG (ref 27–33)
MCHC RBC AUTO-ENTMCNC: 31.9 G/DL (ref 33.6–35)
MCV RBC AUTO: 94.8 FL (ref 81.4–97.8)
MONOCYTES # BLD AUTO: 0.49 K/UL (ref 0–0.85)
MONOCYTES NFR BLD AUTO: 12.8 % (ref 0–13.4)
NEUTROPHILS # BLD AUTO: 1.73 K/UL (ref 2–7.15)
NEUTROPHILS NFR BLD: 45.1 % (ref 44–72)
NRBC # BLD AUTO: 0 K/UL
NRBC BLD-RTO: 0 /100 WBC
PHOSPHATE SERPL-MCNC: 4.9 MG/DL (ref 2.5–4.5)
PLATELET # BLD AUTO: 175 K/UL (ref 164–446)
PMV BLD AUTO: 9.5 FL (ref 9–12.9)
POTASSIUM SERPL-SCNC: 4 MMOL/L (ref 3.6–5.5)
PROT SERPL-MCNC: 6.2 G/DL (ref 6–8.2)
RBC # BLD AUTO: 3.47 M/UL (ref 4.2–5.4)
SODIUM SERPL-SCNC: 138 MMOL/L (ref 135–145)
WBC # BLD AUTO: 3.8 K/UL (ref 4.8–10.8)

## 2019-08-07 PROCEDURE — A9270 NON-COVERED ITEM OR SERVICE: HCPCS | Performed by: FAMILY MEDICINE

## 2019-08-07 PROCEDURE — A9270 NON-COVERED ITEM OR SERVICE: HCPCS | Performed by: INTERNAL MEDICINE

## 2019-08-07 PROCEDURE — 700102 HCHG RX REV CODE 250 W/ 637 OVERRIDE(OP): Performed by: FAMILY MEDICINE

## 2019-08-07 PROCEDURE — 700101 HCHG RX REV CODE 250: Performed by: FAMILY MEDICINE

## 2019-08-07 PROCEDURE — 700102 HCHG RX REV CODE 250 W/ 637 OVERRIDE(OP): Performed by: INTERNAL MEDICINE

## 2019-08-07 PROCEDURE — 83735 ASSAY OF MAGNESIUM: CPT

## 2019-08-07 PROCEDURE — 84100 ASSAY OF PHOSPHORUS: CPT

## 2019-08-07 PROCEDURE — 770001 HCHG ROOM/CARE - MED/SURG/GYN PRIV*

## 2019-08-07 PROCEDURE — 80053 COMPREHEN METABOLIC PANEL: CPT

## 2019-08-07 PROCEDURE — 99231 SBSQ HOSP IP/OBS SF/LOW 25: CPT | Performed by: INTERNAL MEDICINE

## 2019-08-07 PROCEDURE — 36415 COLL VENOUS BLD VENIPUNCTURE: CPT

## 2019-08-07 PROCEDURE — 700111 HCHG RX REV CODE 636 W/ 250 OVERRIDE (IP): Performed by: INTERNAL MEDICINE

## 2019-08-07 PROCEDURE — 85025 COMPLETE CBC W/AUTO DIFF WBC: CPT

## 2019-08-07 RX ADMIN — GABAPENTIN 400 MG: 400 CAPSULE ORAL at 04:43

## 2019-08-07 RX ADMIN — ACETAMINOPHEN 1000 MG: 500 TABLET ORAL at 11:53

## 2019-08-07 RX ADMIN — METOPROLOL TARTRATE 25 MG: 25 TABLET, FILM COATED ORAL at 04:45

## 2019-08-07 RX ADMIN — PYRAZINAMIDE 1000 MG: 500 TABLET ORAL at 04:48

## 2019-08-07 RX ADMIN — POTASSIUM CHLORIDE 20 MEQ: 20 TABLET, EXTENDED RELEASE ORAL at 04:45

## 2019-08-07 RX ADMIN — ATORVASTATIN CALCIUM 10 MG: 10 TABLET, FILM COATED ORAL at 16:24

## 2019-08-07 RX ADMIN — RIFAMPIN 600 MG: 300 CAPSULE ORAL at 04:44

## 2019-08-07 RX ADMIN — MAGNESIUM 64 MG (MAGNESIUM CHLORIDE) TABLET,DELAYED RELEASE 64 MG: at 16:24

## 2019-08-07 RX ADMIN — PRAMIPEXOLE DIHYDROCHLORIDE 0.25 MG: 0.5 TABLET ORAL at 08:26

## 2019-08-07 RX ADMIN — ETHAMBUTOL HYDROCHLORIDE 800 MG: 400 TABLET, FILM COATED ORAL at 04:42

## 2019-08-07 RX ADMIN — GABAPENTIN 400 MG: 400 CAPSULE ORAL at 11:53

## 2019-08-07 RX ADMIN — AMLODIPINE BESYLATE 10 MG: 5 TABLET ORAL at 04:46

## 2019-08-07 RX ADMIN — Medication 1 CAPSULE: at 04:43

## 2019-08-07 RX ADMIN — MAGNESIUM 64 MG (MAGNESIUM CHLORIDE) TABLET,DELAYED RELEASE 64 MG: at 04:44

## 2019-08-07 RX ADMIN — HEPARIN SODIUM 5000 UNITS: 5000 INJECTION, SOLUTION INTRAVENOUS; SUBCUTANEOUS at 16:24

## 2019-08-07 RX ADMIN — ACETAMINOPHEN 1000 MG: 500 TABLET ORAL at 04:43

## 2019-08-07 RX ADMIN — PYRIDOXINE HCL TAB 50 MG 100 MG: 50 TAB at 04:45

## 2019-08-07 RX ADMIN — LIDOCAINE 2 PATCH: 50 PATCH CUTANEOUS at 11:52

## 2019-08-07 RX ADMIN — ISONIAZID 300 MG: 300 TABLET ORAL at 04:43

## 2019-08-07 RX ADMIN — TEMAZEPAM 15 MG: 15 CAPSULE ORAL at 20:29

## 2019-08-07 RX ADMIN — LEVOFLOXACIN 750 MG: 750 TABLET, FILM COATED ORAL at 04:43

## 2019-08-07 RX ADMIN — LEVETIRACETAM 500 MG: 500 TABLET, FILM COATED ORAL at 04:43

## 2019-08-07 RX ADMIN — CINACALCET HYDROCHLORIDE 60 MG: 30 TABLET, COATED ORAL at 04:43

## 2019-08-07 RX ADMIN — LOSARTAN POTASSIUM 75 MG: 50 TABLET ORAL at 04:45

## 2019-08-07 RX ADMIN — PRAMIPEXOLE DIHYDROCHLORIDE 0.25 MG: 0.5 TABLET ORAL at 13:24

## 2019-08-07 RX ADMIN — OMEPRAZOLE 20 MG: 20 CAPSULE, DELAYED RELEASE ORAL at 04:43

## 2019-08-07 RX ADMIN — CYCLOBENZAPRINE 10 MG: 10 TABLET, FILM COATED ORAL at 08:26

## 2019-08-07 RX ADMIN — ACETAMINOPHEN 1000 MG: 500 TABLET ORAL at 16:24

## 2019-08-07 RX ADMIN — METOPROLOL TARTRATE 25 MG: 25 TABLET, FILM COATED ORAL at 16:24

## 2019-08-07 RX ADMIN — OXYCODONE HYDROCHLORIDE 5 MG: 5 TABLET ORAL at 04:48

## 2019-08-07 RX ADMIN — FERROUS SULFATE TAB 325 MG (65 MG ELEMENTAL FE) 325 MG: 325 (65 FE) TAB at 04:44

## 2019-08-07 RX ADMIN — OXYCODONE HYDROCHLORIDE 5 MG: 5 TABLET ORAL at 13:24

## 2019-08-07 RX ADMIN — HEPARIN SODIUM 5000 UNITS: 5000 INJECTION, SOLUTION INTRAVENOUS; SUBCUTANEOUS at 04:43

## 2019-08-07 RX ADMIN — LEVETIRACETAM 500 MG: 500 TABLET, FILM COATED ORAL at 16:24

## 2019-08-07 RX ADMIN — CYCLOBENZAPRINE 10 MG: 10 TABLET, FILM COATED ORAL at 20:29

## 2019-08-07 RX ADMIN — GABAPENTIN 400 MG: 400 CAPSULE ORAL at 16:24

## 2019-08-07 RX ADMIN — SENNOSIDES, DOCUSATE SODIUM 2 TABLET: 50; 8.6 TABLET, FILM COATED ORAL at 04:44

## 2019-08-07 ASSESSMENT — ENCOUNTER SYMPTOMS
FOCAL WEAKNESS: 0
VOMITING: 0
COUGH: 0
SPEECH CHANGE: 0
POLYDIPSIA: 0
PALPITATIONS: 0
CHILLS: 0
HEADACHES: 0
HEARTBURN: 0
BACK PAIN: 0
TREMORS: 0
ORTHOPNEA: 0
SPUTUM PRODUCTION: 0
WEIGHT LOSS: 0
NECK PAIN: 0
PHOTOPHOBIA: 0
NERVOUS/ANXIOUS: 0
BLURRED VISION: 0
HEMOPTYSIS: 0
FEVER: 0
HALLUCINATIONS: 0
FLANK PAIN: 0
NAUSEA: 0
BRUISES/BLEEDS EASILY: 0
DOUBLE VISION: 0

## 2019-08-07 ASSESSMENT — LIFESTYLE VARIABLES: SUBSTANCE_ABUSE: 0

## 2019-08-07 NOTE — DISCHARGE PLANNING
Anticipated Discharge Disposition: SNF    Action: LSW met with pt at bedside to discuss SNF CHOICE. LSW explained that the facilities in [place] have all denied. Pt stated that she would be okay to send a referral to [facility]. Pt's brother, René also aware.   LSW faxed CHOICE form to [facility].     Barriers to Discharge: Placement    Plan: Await SNF acceptance. LSW to assist as needed.

## 2019-08-07 NOTE — PROGRESS NOTES
Patient slept well throughout night. Patient stated her pain was 5/10 this am, oxycodone administered at 0430. Patient ambulated to , and was incontinent of urine as well.

## 2019-08-07 NOTE — PROGRESS NOTES
Hospital Medicine Daily Progress Note    Date of Service  8/6/2019    Chief Complaint  68 y.o. female admitted 5/29/2019 with right iliac and gluteus abscess    Hospital Course  Patient is a 68 year old with history of breast cancer, copd, dm, gerd, dl and htn who was admitted with right iliac gluteus abscess.  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    Interval Problem Update  Patient seen and evaluated on rounds  Discussed with nursing staff on rounds  No complaints  Awaiting postacute placement    Consultants/Specialty  Infectious disease  Neurosurgery  Orthopedics    Code Status  DNR/DNI    Disposition  Awaiting postacute placement to skilled nursing facility in [place], discussed with /case management on rounds      Review of Systems  Review of Systems   Constitutional: Negative for chills, fever and weight loss.   HENT: Negative for ear pain, hearing loss and tinnitus.    Eyes: Negative for blurred vision, double vision and photophobia.   Respiratory: Negative for cough, hemoptysis and sputum production.    Cardiovascular: Negative for chest pain, palpitations and orthopnea.   Gastrointestinal: Negative for heartburn, nausea and vomiting.   Genitourinary: Negative for dysuria, flank pain, frequency and hematuria.   Musculoskeletal: Positive for joint pain. Negative for back pain and neck pain.   Skin: Negative for itching and rash.   Neurological: Negative for tremors, speech change, focal weakness and headaches.   Endo/Heme/Allergies: Negative for environmental allergies and polydipsia. Does not bruise/bleed easily.   Psychiatric/Behavioral: Negative for hallucinations and substance abuse. The patient is not nervous/anxious.         Physical Exam  Temp:  [36.1 °C (97 °F)-36.9 °C (98.5 °F)] 36.7 °C (98 °F)  Pulse:  [64-76] 64  Resp:  [16-18] 18  BP: ()/(53-80)  110/62  SpO2:  [94 %-96 %] 96 %    Physical Exam   Constitutional: She is oriented to person, place, and time. She appears well-developed and well-nourished.   HENT:   Head: Normocephalic and atraumatic.   Eyes: Pupils are equal, round, and reactive to light. EOM are normal.   Neck: Normal range of motion. Neck supple.   Cardiovascular: Normal rate, regular rhythm and normal heart sounds.   Pulmonary/Chest: Effort normal and breath sounds normal.   Abdominal: Soft. Bowel sounds are normal.   Musculoskeletal: Normal range of motion.   Tenderness to palpation  Of the left buttock   Neurological: She is alert and oriented to person, place, and time.   Skin: Skin is warm and dry.   Psychiatric: She has a normal mood and affect. Cognition and memory are impaired.   Nursing note and vitals reviewed.    Unchanged from yesterday     Fluids    Intake/Output Summary (Last 24 hours) at 8/6/2019 1723  Last data filed at 8/6/2019 0800  Gross per 24 hour   Intake 480 ml   Output --   Net 480 ml       Laboratory  Recent Labs     08/06/19  0342   WBC 3.1*   RBC 3.65*   HEMOGLOBIN 10.8*   HEMATOCRIT 35.1*   MCV 96.2   MCH 29.6   MCHC 30.8*   RDW 52.6*   PLATELETCT 183   MPV 9.4     Recent Labs     08/06/19  0342   SODIUM 139   POTASSIUM 4.3   CHLORIDE 109   CO2 23   GLUCOSE 97   BUN 23*   CREATININE 0.68   CALCIUM 9.9                   Imaging  DX-HIP-UNILATERAL-WITH PELVIS-1 VIEW LEFT   Final Result      No LEFT hip or pelvic fracture.      IR-INJ-EXT PSEUDOANEURYSM PERC   Final Result      1.  Ultrasound guided thrombin injection for treatment of a RIGHT gluteal 3.6 cm pseudoaneurysm.      US-EXTREMITY ARTERY LOWER UNILAT RIGHT   Final Result      Right gluteal pseudoaneurysm measuring 3.65 x 2.24 x 2.31 cm.      CTA ABDOMEN PELVIS W & W/O POST PROCESS   Final Result      1.  Interval enlargement of a hyperdense ovoid masslike structure immediately posterior to the right ilium, possibly representing a pseudoaneurysm.   2.  Stable  thin-walled apparent fluid-filled structure immediately deep to the right ilium.   3.   Minimal retroperitoneal adenopathy, grossly stable.   4.  Cholelithiasis.      MR-BRAIN-WITH & W/O   Final Result      1.  Innumerable nodular enhancing lesions throughout the brain parenchyma both the supra and infratentorial compartments predominantly near the gray-white junction. There has been interval increase in size of several of these lesions since previous exam.    These changes may be secondary to metastatic disease or granulomatous disease.      2.  Interval right parietal craniotomy with underlying elliptical postsurgical defect.      3.  Interval increase in size of index right thalamic lesion which has increased vasogenic edema since previous exam.      4.  Age-related cerebral atrophy.      IR-PICC LINE PLACEMENT W/ GUIDANCE > AGE 5   Final Result                  Ultrasound-guided PICC placement performed by qualified nursing staff as    above.          RO-IRTSUTP-2 VIEW   Final Result         1.  Moderate stool in the colon suggests changes of constipation, otherwise nonspecific bowel gas pattern   2.  Atherosclerosis      CT-NEEDLE BX-ABD-RETROPERITONL   Final Result      1.  CT GUIDED biopsy of a right pelvic phlegmon.      2. Significant interval enlargement of a right gluteal pseudoaneurysm. CTA and consideration for possible intervention either with direct thrombin injection or endovascular treatment was suggested. This was conveyed to Dr. Alatorre on 7/11/2019 via [facility]    text at 1750 hours.      US-EXTREMITY VENOUS LOWER BILAT   Final Result      IR-US GUIDED PIV   Final Result    Ultrasound-guided PERIPHERAL IV INSERTION performed by    qualified nursing staff as above.            MR-LUMBAR SPINE-WITH & W/O   Final Result         1.  Grade 2-3 spondylolisthesis at the L5-S1 level with bilateral spondylolysis of the pars interarticularis. This causes severe bilateral neural foraminal stenosis at this level.       2.  Interval removal of posterior fusion hardware at the lumbosacral junction.      3.  Diffuse osteomyelitis involving the L5 vertebral body and the first 3 sacral segments.      4.  Chronic discitis at the L5-S1 level with anterior paraspinous abscess at this level and anterior to the S1 sacral segment.      5.  Smaller intraosseous bone bone abscesses or areas of necrosis noted in the sacrum.      6.  There is also evidence of osteomyelitis involving the jose antonio and sacrum bilaterally along the course of the previous sacral fixation screw. There is a large 4 cm abscess noted in the right gluteal soft tissues in a smaller 3 cm chronic appearing    abscess in the left gluteal musculature.      7.  There is a 1.5 cm intraosseous abscess or area of necrosis in the right posterior ilium.      8.  There is a 3.3 cm centimeters multiloculated abscess anterior to the right sacroiliac joint.      MR-STEALTH BRAIN WITH & W/O   Final Result         1.  Innumerable subcentimeter brain metastases, many at the gray-white junction but also multiple noted throughout the basal ganglia and brainstem.      2.  Largest index lesion is noted in the right thalamus and has increased in size since previous exam and currently measures 9 mm and has a moderate amount of surrounding vasogenic edema.      US-EXTREMITY NON VASCULAR UNILATERAL RIGHT   Final Result      No right hip joint effusion. No soft tissue fluid collection.      MR-BRAIN-WITH & W/O   Final Result      1.  Innumerable supra and infratentorial enhancing nodules are again noted throughout the brain parenchyma with multiple lesions appearing somewhat larger and with increased enhancement. No associated diffusion restriction. Unchanged differential    considerations including bacterial or fungal infection versus metastatic disease.   2.  Mild diffuse cerebral substance loss.   3.  Mild microangiopathic ischemic change versus demyelination or gliosis.      CT-NEEDLE BX-DEEP  BONE   Final Result      CT GUIDED RIGHT ILIUM DEEP BONE BIOPSY. SAMPLES WERE SENT TO [facility] FOR ANALYSIS.      CT-PELVIS WITH   Final Result         1.  No significant interval change in the size of the RIGHT iliac muscle fluid collections   2.  Hyperdense RIGHT gluteal lesion moderately suspicious for pseudoaneurysm with adjacent hematoma.   3.  Changes in the RIGHT iliac bone, BILATERAL sacrum and LEFT iliac bone suspicious for osteomyelitis   4.  Changes at L5-S1 suspicious for discitis osteomyelitis      CT-PELVIS WITH   Final Result      1.  Residual or recurrent abscess within the right iliacus muscle measuring 2.5 x 1.8 cm. It slightly smaller than on 5/28/2019      2.  Interval removal of hardware from the iliac bones and sacrum      3.  Lytic change of the right iliac bone and the sacrum. This could be related to hardware versus osteomyelitis.      4.  Subacute fractures of the right ilium, sacrum, and there is lucency within the left iliac bone that is likely from hardware      MR-BRAIN-WITH & W/O   Final Result      1.  Interval progression of supra and infratentorial intra-axial nodules and associated edema. This short-term interval progression favors infection over neoplasm though both remain considerations.   2.  Mild atrophy      DX-PORTABLE FLUOROSCOPY < 1 HOUR   Final Result         Portable fluoroscopy utilized for 2 minutes.      DX-PELVIS-1 OR 2 VIEWS   Final Result      Status post hardware removal from the right SI joint      DX-CHEST-PORTABLE (1 VIEW)   Final Result      Interstitial prominence could be due to pulmonary edema or hypoventilatory change.      DX-CHEST-LIMITED (1 VIEW)   Final Result      LEFT basilar underinflation atelectasis or pneumonia      CT-DRAIN-HEMATOMA - SEROMA   Final Result      CT-guided RIGHT pelvic fluid collection aspiration, laboratory evaluation pending              Assessment/Plan  * Brain lesion- (present on admission)  Assessment & Plan  Right-sided  craniotomy for resection of superficial mass  6/28/2019  Continue Isoniazid, Rifampin, Ethambutol, Pyrazinamide per ID, discussed with ID they have reached out to [facility] to discuss outpatient regimen  Continue Keppra for seizure prophylaxis, per ID consider restarting steroids if she has breakthrough seizures  No seizures observed    Abscess of right iliac muscle and right gluteus medius muscle- (present on admission)  Assessment & Plan  CT guided pelvic aspiration 5/30/2019  Hardware removal, pelvis 6/5/2019  CT guided deep bone biopsy 6/19/2019  CT guided aspiration/biopsy of a R gluteal fluid collection/phlegmon 7/11/2019 - consistent with TB  Continue Isoniazid, Rifampin, Ethambutol, Pyrazinamide  Associated pain improving  Levaquin to continue for 9-12 months    Continue antibiotic, guidance of antibiotic therapy per infectious disease team      History of DVT (deep vein thrombosis)- (present on admission)  Assessment & Plan  Xarelto was held due to hip hematoma and rifampin  Consider starting coumadin in 1  weeks if h/h remains stable    Osteomyelitis (HCC)- (present on admission)  Assessment & Plan  Disseminated tuberculosis, infectious disease following and managing    Granulomatous disease - (present on admission)  Assessment & Plan  Continue management per infectious disease team    Pseudoaneurysm following procedure (HCC)- (present on admission)  Assessment & Plan  s/p IR thrombin injection for thrombosed psedudoanerysm    Non-severe protein-calorie malnutrition (HCC)- (present on admission)  Assessment & Plan  Continue to optimize nutrition      Hypomagnesemia- (present on admission)  Assessment & Plan  Oral magnesium    Dysphagia- (present on admission)  Assessment & Plan  Diet per SLP recommendations    Essential hypertension- (present on admission)  Assessment & Plan  Continue Metoprolol, Losartan and Amlodipine as tolerated  Blood pressure stable    RLS (restless legs syndrome)- (present on  admission)  Assessment & Plan  Pramipexole    Chronic pain- (present on admission)  Assessment & Plan  Consider opioid sparing therapy    History of breast cancer- (present on admission)  Assessment & Plan  history left mastectomy and breast implant.      COPD (chronic obstructive pulmonary disease) (HCC)- (present on admission)  Assessment & Plan  No exacerbation  RT protocol       VTE prophylaxis: Heparin

## 2019-08-07 NOTE — DISCHARGE PLANNING
Anticipated Discharge Disposition: TBD    Action: LSW tc bedside RNKatherin to see if Robert with [facility] who had clinical questions returned her phone call. Katherin states she hasn't heard back yet, but will attempt to call again today.    Barriers to Discharge: None    Plan: LSW to assist as needed.       Addendum 1117  LSW informed by Katherin NGUYEN that the questions being asked would be questions for Dr. Devi to answer.     Addendum 1330  Pt discussed in rounds and Dr. Devi provided cell number to pass on to [facility] and asked that they call him. LSW called Robert with [facility] ([phone]) and LSW informed that pt was declined by facility.

## 2019-08-07 NOTE — THERAPY
"Occupational Therapy Treatment completed with focus on ADLs and ADL transfers.  Functional Status:    Pt seen for OT treatment this afternoon. While in supine, she donned socks Mod I - will plan to attempt pants in this position. Se mobilized to EOB supv, STS to FWW supv. Pt using different FWW that is much easier to move, and patient's ambulation is greatly improved, no cues needed for FWW mgmt. Pt ambulated 1/2 unit without c/o hip pain, though did mention R foot pain. Pt returned to room and was agreeable to sit in chair for dinner. Will continue to work with her in this setting.     Plan of Care: Will benefit from Occupational Therapy 3 times per week  Discharge Recommendations:  Equipment Will Continue to Assess for Equipment Needs. Post-acute therapy Recommend post-acute placement for additional occupational therapy services prior to discharge home. Patient can tolerate post-acute therapies at a 5x/week frequency.     See \"Rehab Therapy-Acute\" Patient Summary Report for complete documentation.   "

## 2019-08-07 NOTE — PROGRESS NOTES
[facility] Medicine Daily Progress Note    Date of Service  8/7/2019    Chief Complaint  68 y.o. female admitted 5/29/2019 with right iliac and gluteus abscess    Hospital Course  Patient is a 68 year old with history of breast cancer, copd, DM, gerd, dl and htn who was admitted with right iliac gluteus abscess.  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    Interval Problem Update  Patient seen and evaluated on rounds  Discussed with nursing staff on rounds  No complaints  Awaiting postacute placement to SNF     Consultants/Specialty  Infectious disease  Neurosurgery  Orthopedics    Code Status  DNR/DNI    Disposition  Awaiting postacute placement to skilled nursing facility in [place], discussed with /case management on rounds    I provided my personal cell phone number for the skilled nursing facility physician to get in touch with me if they would like to discuss physician to physician sign out    Review of Systems  Review of Systems   Constitutional: Negative for chills, fever and weight loss.   HENT: Negative for ear pain, hearing loss and tinnitus.    Eyes: Negative for blurred vision, double vision and photophobia.   Respiratory: Negative for cough, hemoptysis and sputum production.    Cardiovascular: Negative for chest pain, palpitations and orthopnea.   Gastrointestinal: Negative for heartburn, nausea and vomiting.   Genitourinary: Negative for dysuria, flank pain, frequency and hematuria.   Musculoskeletal: Positive for joint pain. Negative for back pain and neck pain.   Skin: Negative for itching and rash.   Neurological: Negative for tremors, speech change, focal weakness and headaches.   Endo/Heme/Allergies: Negative for environmental allergies and polydipsia. Does not bruise/bleed easily.   Psychiatric/Behavioral: Negative for hallucinations and substance abuse. The  patient is not nervous/anxious.         Physical Exam  Temp:  [36.2 °C (97.1 °F)-36.7 °C (98.1 °F)] 36.6 °C (97.9 °F)  Pulse:  [75-85] 75  Resp:  [14-18] 17  BP: (109-139)/(53-68) 139/68  SpO2:  [93 %-100 %] 94 %    Physical Exam   Constitutional: She is oriented to person, place, and time. She appears well-developed and well-nourished.   HENT:   Head: Normocephalic and atraumatic.   Eyes: Pupils are equal, round, and reactive to light. EOM are normal.   Neck: Normal range of motion. Neck supple.   Cardiovascular: Normal rate, regular rhythm and normal heart sounds.   Pulmonary/Chest: Effort normal and breath sounds normal.   Abdominal: Soft. Bowel sounds are normal.   Musculoskeletal: Normal range of motion.   Tenderness to palpation  Of the left buttock   Neurological: She is alert and oriented to person, place, and time.   Skin: Skin is warm and dry.   Psychiatric: She has a normal mood and affect. Cognition and memory are impaired.   Nursing note and vitals reviewed.    Unchanged from yesterday     Fluids  No intake or output data in the 24 hours ending 08/07/19 1517    Laboratory  Recent Labs     08/06/19  0342 08/07/19  0259   WBC 3.1* 3.8*   RBC 3.65* 3.47*   HEMOGLOBIN 10.8* 10.5*   HEMATOCRIT 35.1* 32.9*   MCV 96.2 94.8   MCH 29.6 30.3   MCHC 30.8* 31.9*   RDW 52.6* 51.7*   PLATELETCT 183 175   MPV 9.4 9.5     Recent Labs     08/06/19  0342 08/07/19  0259   SODIUM 139 138   POTASSIUM 4.3 4.0   CHLORIDE 109 106   CO2 23 25   GLUCOSE 97 96   BUN 23* 28*   CREATININE 0.68 0.80   CALCIUM 9.9 9.1                   Imaging  DX-HIP-UNILATERAL-WITH PELVIS-1 VIEW LEFT   Final Result      No LEFT hip or pelvic fracture.      IR-INJ-EXT PSEUDOANEURYSM PERC   Final Result      1.  Ultrasound guided thrombin injection for treatment of a RIGHT gluteal 3.6 cm pseudoaneurysm.      US-EXTREMITY ARTERY LOWER UNILAT RIGHT   Final Result      Right gluteal pseudoaneurysm measuring 3.65 x 2.24 x 2.31 cm.      CTA ABDOMEN PELVIS  W & W/O POST PROCESS   Final Result      1.  Interval enlargement of a hyperdense ovoid masslike structure immediately posterior to the right ilium, possibly representing a pseudoaneurysm.   2.  Stable thin-walled apparent fluid-filled structure immediately deep to the right ilium.   3.   Minimal retroperitoneal adenopathy, grossly stable.   4.  Cholelithiasis.      MR-BRAIN-WITH & W/O   Final Result      1.  Innumerable nodular enhancing lesions throughout the brain parenchyma both the supra and infratentorial compartments predominantly near the gray-white junction. There has been interval increase in size of several of these lesions since previous exam.    These changes may be secondary to metastatic disease or granulomatous disease.      2.  Interval right parietal craniotomy with underlying elliptical postsurgical defect.      3.  Interval increase in size of index right thalamic lesion which has increased vasogenic edema since previous exam.      4.  Age-related cerebral atrophy.      IR-PICC LINE PLACEMENT W/ GUIDANCE > AGE 5   Final Result                  Ultrasound-guided PICC placement performed by qualified nursing staff as    above.          [id number] VIEW   Final Result         1.  Moderate stool in the colon suggests changes of constipation, otherwise nonspecific bowel gas pattern   2.  Atherosclerosis      CT-NEEDLE BX-ABD-RETROPERITONL   Final Result      1.  CT GUIDED biopsy of a right pelvic phlegmon.      2. Significant interval enlargement of a right gluteal pseudoaneurysm. CTA and consideration for possible intervention either with direct thrombin injection or endovascular treatment was suggested. This was conveyed to Dr. Alatorre on 7/11/2019 via [facility]    text at 1750 hours.      US-EXTREMITY VENOUS LOWER BILAT   Final Result      IR-US GUIDED PIV   Final Result    Ultrasound-guided PERIPHERAL IV INSERTION performed by    qualified nursing staff as above.            MR-LUMBAR SPINE-WITH & W/O    Final Result         1.  Grade 2-3 spondylolisthesis at the L5-S1 level with bilateral spondylolysis of the pars interarticularis. This causes severe bilateral neural foraminal stenosis at this level.      2.  Interval removal of posterior fusion hardware at the lumbosacral junction.      3.  Diffuse osteomyelitis involving the L5 vertebral body and the first 3 sacral segments.      4.  Chronic discitis at the L5-S1 level with anterior paraspinous abscess at this level and anterior to the S1 sacral segment.      5.  Smaller intraosseous bone bone abscesses or areas of necrosis noted in the sacrum.      6.  There is also evidence of osteomyelitis involving the jose antonio and sacrum bilaterally along the course of the previous sacral fixation screw. There is a large 4 cm abscess noted in the right gluteal soft tissues in a smaller 3 cm chronic appearing    abscess in the left gluteal musculature.      7.  There is a 1.5 cm intraosseous abscess or area of necrosis in the right posterior ilium.      8.  There is a 3.3 cm centimeters multiloculated abscess anterior to the right sacroiliac joint.      MR-STEALTH BRAIN WITH & W/O   Final Result         1.  Innumerable subcentimeter brain metastases, many at the gray-white junction but also multiple noted throughout the basal ganglia and brainstem.      2.  Largest index lesion is noted in the right thalamus and has increased in size since previous exam and currently measures 9 mm and has a moderate amount of surrounding vasogenic edema.      US-EXTREMITY NON VASCULAR UNILATERAL RIGHT   Final Result      No right hip joint effusion. No soft tissue fluid collection.      MR-BRAIN-WITH & W/O   Final Result      1.  Innumerable supra and infratentorial enhancing nodules are again noted throughout the brain parenchyma with multiple lesions appearing somewhat larger and with increased enhancement. No associated diffusion restriction. Unchanged differential    considerations  including bacterial or fungal infection versus metastatic disease.   2.  Mild diffuse cerebral substance loss.   3.  Mild microangiopathic ischemic change versus demyelination or gliosis.      CT-NEEDLE BX-DEEP BONE   Final Result      CT GUIDED RIGHT ILIUM DEEP BONE BIOPSY. SAMPLES WERE SENT TO MICROBIOLOGY FOR ANALYSIS.      CT-PELVIS WITH   Final Result         1.  No significant interval change in the size of the RIGHT iliac muscle fluid collections   2.  Hyperdense RIGHT gluteal lesion moderately suspicious for pseudoaneurysm with adjacent hematoma.   3.  Changes in the RIGHT iliac bone, BILATERAL sacrum and LEFT iliac bone suspicious for osteomyelitis   4.  Changes at L5-S1 suspicious for discitis osteomyelitis      CT-PELVIS WITH   Final Result      1.  Residual or recurrent abscess within the right iliacus muscle measuring 2.5 x 1.8 cm. It slightly smaller than on 5/28/2019      2.  Interval removal of hardware from the iliac bones and sacrum      3.  Lytic change of the right iliac bone and the sacrum. This could be related to hardware versus osteomyelitis.      4.  Subacute fractures of the right ilium, sacrum, and there is lucency within the left iliac bone that is likely from hardware      MR-BRAIN-WITH & W/O   Final Result      1.  Interval progression of supra and infratentorial intra-axial nodules and associated edema. This short-term interval progression favors infection over neoplasm though both remain considerations.   2.  Mild atrophy      DX-PORTABLE FLUOROSCOPY < 1 HOUR   Final Result         Portable fluoroscopy utilized for 2 minutes.      DX-PELVIS-1 OR 2 VIEWS   Final Result      Status post hardware removal from the right SI joint      DX-CHEST-PORTABLE (1 VIEW)   Final Result      Interstitial prominence could be due to pulmonary edema or hypoventilatory change.      DX-CHEST-LIMITED (1 VIEW)   Final Result      LEFT basilar underinflation atelectasis or pneumonia      CT-DRAIN-HEMATOMA -  SEROMA   Final Result      CT-guided RIGHT pelvic fluid collection aspiration, laboratory evaluation pending              Assessment/Plan  * Brain lesion- (present on admission)  Assessment & Plan  Right-sided craniotomy for resection of superficial mass  6/28/2019  Continue Isoniazid, Rifampin, Ethambutol, Pyrazinamide per ID, discussed with ID they have reached out to [facility] to discuss outpatient regimen  Continue Keppra for seizure prophylaxis, per ID consider restarting steroids if she has breakthrough seizures  No seizures observed    Abscess of right iliac muscle and right gluteus medius muscle- (present on admission)  Assessment & Plan  CT guided pelvic aspiration 5/30/2019  Hardware removal, pelvis 6/5/2019  CT guided deep bone biopsy 6/19/2019  CT guided aspiration/biopsy of a R gluteal fluid collection/phlegmon 7/11/2019 - consistent with TB  Continue Isoniazid, Rifampin, Ethambutol, Pyrazinamide  Associated pain improving  Levaquin to continue for 9-12 months    Continue antibiotic, guidance of antibiotic therapy per infectious disease team      History of DVT (deep vein thrombosis)- (present on admission)  Assessment & Plan  Xarelto was held due to hip hematoma and rifampin  Consider starting coumadin in 1  weeks if h/h remains stable    Osteomyelitis (HCC)- (present on admission)  Assessment & Plan  Disseminated tuberculosis, infectious disease following and managing    Granulomatous disease - (present on admission)  Assessment & Plan  Continue management per infectious disease team    Pseudoaneurysm following procedure (HCC)- (present on admission)  Assessment & Plan  s/p IR thrombin injection for thrombosed psedudoanerysm    Non-severe protein-calorie malnutrition (HCC)- (present on admission)  Assessment & Plan  Continue to optimize nutrition      Hypomagnesemia- (present on admission)  Assessment & Plan  Oral magnesium    Dysphagia- (present on admission)  Assessment & Plan  Diet per SLP  recommendations    Essential hypertension- (present on admission)  Assessment & Plan  Continue Metoprolol, Losartan and Amlodipine as tolerated  Blood pressure stable    RLS (restless legs syndrome)- (present on admission)  Assessment & Plan  Pramipexole    Chronic pain- (present on admission)  Assessment & Plan  Consider opioid sparing therapy    History of breast cancer- (present on admission)  Assessment & Plan  history left mastectomy and breast implant.      COPD (chronic obstructive pulmonary disease) (HCC)- (present on admission)  Assessment & Plan  No exacerbation  RT protocol       VTE prophylaxis: Heparin

## 2019-08-08 PROCEDURE — 700102 HCHG RX REV CODE 250 W/ 637 OVERRIDE(OP): Performed by: FAMILY MEDICINE

## 2019-08-08 PROCEDURE — A9270 NON-COVERED ITEM OR SERVICE: HCPCS | Performed by: INTERNAL MEDICINE

## 2019-08-08 PROCEDURE — 99231 SBSQ HOSP IP/OBS SF/LOW 25: CPT | Performed by: INTERNAL MEDICINE

## 2019-08-08 PROCEDURE — 700102 HCHG RX REV CODE 250 W/ 637 OVERRIDE(OP): Performed by: INTERNAL MEDICINE

## 2019-08-08 PROCEDURE — A9270 NON-COVERED ITEM OR SERVICE: HCPCS | Performed by: FAMILY MEDICINE

## 2019-08-08 PROCEDURE — 770001 HCHG ROOM/CARE - MED/SURG/GYN PRIV*

## 2019-08-08 PROCEDURE — 700101 HCHG RX REV CODE 250: Performed by: FAMILY MEDICINE

## 2019-08-08 PROCEDURE — 700111 HCHG RX REV CODE 636 W/ 250 OVERRIDE (IP): Performed by: INTERNAL MEDICINE

## 2019-08-08 PROCEDURE — 92507 TX SP LANG VOICE COMM INDIV: CPT

## 2019-08-08 PROCEDURE — 99233 SBSQ HOSP IP/OBS HIGH 50: CPT | Performed by: INTERNAL MEDICINE

## 2019-08-08 RX ORDER — PYRAZINAMIDE TABLET 500 MG/1
250 TABLET ORAL DAILY
Status: DISCONTINUED | OUTPATIENT
Start: 2019-08-08 | End: 2019-08-10 | Stop reason: HOSPADM

## 2019-08-08 RX ADMIN — ATORVASTATIN CALCIUM 10 MG: 10 TABLET, FILM COATED ORAL at 17:38

## 2019-08-08 RX ADMIN — ACETAMINOPHEN 1000 MG: 500 TABLET ORAL at 17:39

## 2019-08-08 RX ADMIN — OXYCODONE HYDROCHLORIDE 5 MG: 5 TABLET ORAL at 17:45

## 2019-08-08 RX ADMIN — PRAMIPEXOLE DIHYDROCHLORIDE 0.25 MG: 0.5 TABLET ORAL at 13:16

## 2019-08-08 RX ADMIN — OMEPRAZOLE 20 MG: 20 CAPSULE, DELAYED RELEASE ORAL at 04:26

## 2019-08-08 RX ADMIN — HEPARIN SODIUM 5000 UNITS: 5000 INJECTION, SOLUTION INTRAVENOUS; SUBCUTANEOUS at 17:39

## 2019-08-08 RX ADMIN — POTASSIUM CHLORIDE 20 MEQ: 20 TABLET, EXTENDED RELEASE ORAL at 04:30

## 2019-08-08 RX ADMIN — GABAPENTIN 400 MG: 400 CAPSULE ORAL at 12:00

## 2019-08-08 RX ADMIN — MAGNESIUM 64 MG (MAGNESIUM CHLORIDE) TABLET,DELAYED RELEASE 64 MG: at 04:31

## 2019-08-08 RX ADMIN — ACETAMINOPHEN 1000 MG: 500 TABLET ORAL at 12:00

## 2019-08-08 RX ADMIN — SENNOSIDES, DOCUSATE SODIUM 2 TABLET: 50; 8.6 TABLET, FILM COATED ORAL at 04:27

## 2019-08-08 RX ADMIN — PYRIDOXINE HCL TAB 50 MG 100 MG: 50 TAB at 04:26

## 2019-08-08 RX ADMIN — PYRAZINAMIDE 1000 MG: 500 TABLET ORAL at 04:27

## 2019-08-08 RX ADMIN — ISONIAZID 300 MG: 300 TABLET ORAL at 04:26

## 2019-08-08 RX ADMIN — PRAMIPEXOLE DIHYDROCHLORIDE 0.25 MG: 0.5 TABLET ORAL at 08:29

## 2019-08-08 RX ADMIN — METOPROLOL TARTRATE 25 MG: 25 TABLET, FILM COATED ORAL at 17:38

## 2019-08-08 RX ADMIN — HEPARIN SODIUM 5000 UNITS: 5000 INJECTION, SOLUTION INTRAVENOUS; SUBCUTANEOUS at 04:31

## 2019-08-08 RX ADMIN — ACETAMINOPHEN 1000 MG: 500 TABLET ORAL at 04:26

## 2019-08-08 RX ADMIN — PRAMIPEXOLE DIHYDROCHLORIDE 0.25 MG: 0.5 TABLET ORAL at 00:27

## 2019-08-08 RX ADMIN — METOPROLOL TARTRATE 25 MG: 25 TABLET, FILM COATED ORAL at 04:30

## 2019-08-08 RX ADMIN — RIFAMPIN 600 MG: 300 CAPSULE ORAL at 04:31

## 2019-08-08 RX ADMIN — TEMAZEPAM 15 MG: 15 CAPSULE ORAL at 19:40

## 2019-08-08 RX ADMIN — PYRAZINAMIDE 250 MG: 500 TABLET ORAL at 17:39

## 2019-08-08 RX ADMIN — CYCLOBENZAPRINE 10 MG: 10 TABLET, FILM COATED ORAL at 12:08

## 2019-08-08 RX ADMIN — ETHAMBUTOL HYDROCHLORIDE 800 MG: 400 TABLET, FILM COATED ORAL at 04:27

## 2019-08-08 RX ADMIN — GABAPENTIN 400 MG: 400 CAPSULE ORAL at 04:27

## 2019-08-08 RX ADMIN — OXYCODONE HYDROCHLORIDE 5 MG: 5 TABLET ORAL at 02:22

## 2019-08-08 RX ADMIN — LEVOFLOXACIN 750 MG: 750 TABLET, FILM COATED ORAL at 04:27

## 2019-08-08 RX ADMIN — FERROUS SULFATE TAB 325 MG (65 MG ELEMENTAL FE) 325 MG: 325 (65 FE) TAB at 04:26

## 2019-08-08 RX ADMIN — Medication 1 CAPSULE: at 04:31

## 2019-08-08 RX ADMIN — CINACALCET HYDROCHLORIDE 60 MG: 30 TABLET, COATED ORAL at 04:30

## 2019-08-08 RX ADMIN — PRAMIPEXOLE DIHYDROCHLORIDE 0.25 MG: 0.5 TABLET ORAL at 19:40

## 2019-08-08 RX ADMIN — LEVETIRACETAM 500 MG: 500 TABLET, FILM COATED ORAL at 17:39

## 2019-08-08 RX ADMIN — GABAPENTIN 400 MG: 400 CAPSULE ORAL at 17:39

## 2019-08-08 RX ADMIN — AMLODIPINE BESYLATE 10 MG: 5 TABLET ORAL at 04:27

## 2019-08-08 RX ADMIN — CYCLOBENZAPRINE 10 MG: 10 TABLET, FILM COATED ORAL at 19:40

## 2019-08-08 RX ADMIN — LEVETIRACETAM 500 MG: 500 TABLET, FILM COATED ORAL at 04:27

## 2019-08-08 RX ADMIN — MAGNESIUM 64 MG (MAGNESIUM CHLORIDE) TABLET,DELAYED RELEASE 64 MG: at 17:39

## 2019-08-08 RX ADMIN — LOSARTAN POTASSIUM 75 MG: 50 TABLET ORAL at 04:26

## 2019-08-08 ASSESSMENT — ENCOUNTER SYMPTOMS
CHILLS: 0
TREMORS: 0
HEARTBURN: 0
BRUISES/BLEEDS EASILY: 0
SPUTUM PRODUCTION: 0
PHOTOPHOBIA: 0
SPEECH CHANGE: 0
ORTHOPNEA: 0
COUGH: 0
DIZZINESS: 0
NAUSEA: 0
FLANK PAIN: 0
PALPITATIONS: 0
NECK PAIN: 0
BLURRED VISION: 0
SHORTNESS OF BREATH: 0
POLYDIPSIA: 0
FEVER: 0
VOMITING: 0
BACK PAIN: 0
NERVOUS/ANXIOUS: 0
ABDOMINAL PAIN: 0
DIARRHEA: 0
FOCAL WEAKNESS: 0
HALLUCINATIONS: 0
WEIGHT LOSS: 0
DOUBLE VISION: 0
HEADACHES: 0
HEMOPTYSIS: 0

## 2019-08-08 ASSESSMENT — LIFESTYLE VARIABLES: SUBSTANCE_ABUSE: 0

## 2019-08-08 NOTE — THERAPY
"Speech Language Therapy cognitive-linguistic treatment completed.   Functional Status:  Patient seen this date in her room. She was asleep at the beginning of the session and struggled to maintain alertness. Worked to reposition patient and put her glasses on, she had better attention after this. Her speech was initially difficult to understand d/t limited movement of the articulators, this also improved as she became more alert. At the beginning of the session, it was unclear if patient was understanding instructions and she answered the location question incorrectly x2 (\"at the store\") with verbal correction provided by SLP. Upon the 3rd trial after about 5 min since last asking, she had correct delayed recall of location. She was oriented to self, year, month, and reason for admission. Patient participated in attention tasks, maintaining attention for at least 10 minutes with min prompting. Her ability to read single words and scan was WNL. At the end of the session, she stated her hip was uncomfortable and then she started to get up and out of bed. She required verbal cueing to call for assistance to get out of bed and moderate cueing to find the call button. She was able to verbalize why it was important for her to call for assistance (\"so I don't fall\"). She continues to have fluctuating cognitive abilities and her attention, memory, and problem solving skills are still impaired to a mild moderate level.   Recommendations: Patient continues to need 24/7 supervision/assistance at place of discharge.   Plan of Care: Will benefit from Speech Therapy 3 times per week  Post-Acute Therapy: Recommend inpatient transitional care services for continued speech therapy services.        See \"Rehab Therapy-Acute\" Patient Summary Report for complete documentation.     "

## 2019-08-08 NOTE — PROGRESS NOTES
Infectious Disease Progress Note    Author: Lito Burrows M.D. Date & Time of service: 8/8/2019  8:33 AM    Chief Complaint:  Brain abscess, gluteal abscess  Vertebral OM     Interval History:  68 y.o. female admitted 5/29/2019 as a transfer from [facility] since 4/30/2019. + diabetes, SANTOSH of right hip with hardware, and breast cancer who was originally admitted to [facility] on 03/08/2019 for right hip and pelvic pain.  Work-up revealed a right iliopsoas lesion, gluteal abscess, and mult brain abscesses     7/4 AF much more alert and conversant today-c/o left hip pain, unrelenting and would like parietal dressing changed. HA at surgical site.  Denies SE abx. No new neurodeficits  7/5 afebrile WBC 5.4.  Patient sleeping but arousable.  She denies any headache, nausea, vomiting.  7/6 afebrile WBC 6.5.  Patient sitting up in chair and having excruciating back pain  7/8 Pt has no specific complaints, she is mildly confused today.  She seems to be tolerating her medications with no significant lab abnormalities.  7/9 AF, O2 RA, Significant left hip pain today.  She does appear to be tolerating her antibiotics.  Ultrasound of bilateral lower extremities has been ordered.  7/10 AF, O2 RA,  Plan for IR drainage of sacral collection soon.   7/11 AF, O2 RA,  Pt continued on RIPE and ampho.  She is tolerating well overall, requiring some mag and potassium replacement.  She is complaining of severe pain in left hip again today.  Plan for biopsy later today.   7/12  IR biopsy of R gluteal abscess/hip. AF, O2 2 L NC,   tolerating antibiotics so far.  She is quite somnolent today but per nursing she was oriented x4 earlier  7/14 AF, O2 RA, more alert today, conversant and asking for advance in diet. Left hip pain ongoing but improved.   7/15 afebrile WBC 4.4.  Patient's speech is somewhat muffled but she is alert and responding to questions appropriately.  MRI shows worsening lesions and amphotericin stopped.  Nurse reports  waxing and waning mental status  7/16 afebrile.  Patient sleeping but arousable.  She is answer questions appropriately but then forgets that she is in the hospital.  7/17 afebrile WBC 4.9 patient is very drowsy as she states she did not sleep well yesterday.  She is asking about whether or not she has a fungal infection.  Plan of care discussed with patient.  7/19 afebrile patient continues to only endorse left hip pain exacerbated by sitting up in the chair.  She sat in the chair for 30 minutes yesterday.  Getting out of bed is limited secondary to left hip pain.  Mental status remains about the same with intermittent waxing and waning.  No new issues today per RN  7/21 afebrile patient is much more awake and alert today.  She is answering questions appropriately.  Yesterday she was complaining of some abdominal pain so CT scan was done and revealed interval enlargement of a hyperdense ovoid masslike structure posterior to the right ilium concerning for pseudoaneurysm.  7/22/2019 no fevers.  No new issues overnight.  Had her ultrasound today follow the results  7/23/2019-no fevers.  Ongoing pain.  The AFB cultures have come back positive for TB  7/24/2019 no fevers.  No new issues overnight.  Very anxious and agitated  7/25/2019 no fevers.  No new issues.  Says she feels slightly better.  7/26 AF WBC 4.5 tolerating meds well-wants cream to decrease size of surgical scar-has right-sided rib pain and joint pain  7/27 AF ambulating with assist with walker-no new complaints  7/28 AF same pain complaint-remains debilitated with intermittent confusion  7/29 AF WBC 4.2 no clinical change  7/30 AF WBC 3.3 sleeping sounding-dw RN has been complaining of hip pain but able to ambulate  7/31 AF WBC 3 ambulating in peng with rolling walker-NAD. No new complaints  8/1 AF knitting in bed-looks comfortable but requesting more pain meds  8/2 AF No CBC no new complaints  8/3 AF tolerating PO-no rash Same pain  8/5 afebrile, no CBC,  continues to report pain, no new medications, tolerating antibiotics.   afebrile, white count 3.1.  No new issues, tolerating antibiotics.   afebrile, white count 3.8 on .  No concerns on CMP.  Reports no issues with vision, solving sudoku this morning.  Spoke with health department.  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    Review of Systems:  Review of Systems   Constitutional: Negative for chills and fever.   Eyes: Negative for blurred vision and double vision.   Respiratory: Negative for cough and shortness of breath.    Cardiovascular: Negative for chest pain.   Gastrointestinal: Negative for abdominal pain, diarrhea, nausea and vomiting.   Genitourinary: Negative for dysuria.   Musculoskeletal: Positive for joint pain.   Skin: Negative for itching and rash.   Neurological: Negative for dizziness.   All other systems reviewed and are negative.      Hemodynamics:  Temp (24hrs), Av.7 °C (98 °F), Min:36.2 °C (97.1 °F), Max:37 °C (98.6 °F)  Temperature: 37 °C (98.6 °F)  Pulse  Av.1  Min: 49  Max: 195   Blood Pressure : 148/65       Physical Exam:  Physical Exam   Constitutional: She is oriented to person, place, and time. No distress.   Elderly  Thin and frail  Sitting up in chair, solving sudoku   HENT:   Mouth/Throat: Oropharynx is clear and moist. No oropharyngeal exudate.   Right parietal surgical site healed   Eyes: Right eye exhibits no discharge. Left eye exhibits no discharge.   Neck: Neck supple. No JVD present.   Cardiovascular: Normal rate and regular rhythm.   Pulmonary/Chest: Effort normal. No stridor. She has no wheezes. She has no rales.   Abdominal: Soft. There is no rebound and no guarding.   Musculoskeletal: She exhibits no edema.   Neurological: She is alert and oriented to person, place, and time. Coordination normal.   Skin: Skin is warm. No rash noted. She is not diaphoretic. No erythema.   Nursing note and vitals reviewed.      Meds:    Current Facility-Administered  Medications:   •  oxyCODONE immediate-release  •  acetaminophen  •  magnesium chloride  •  levoFLOXacin  •  hyoscyamine-maalox plus-lidocaine viscous  •  riFAMPin  •  thrombin  •  temazepam  •  potassium chloride SA  •  losartan  •  pramipexole  •  senna-docusate  •  gabapentin  •  ferrous sulfate  •  diphenhydrAMINE  •  heparin  •  pyridoxine  •  levETIRAcetam  •  isoniazid  •  ethambutol  •  pyrazinamide  •  Pharmacy Consult Request  •  labetalol  •  lactobacillus rhamnosus  •  lidocaine  •  cyclobenzaprine  •  Respiratory Care per Protocol  •  amLODIPine  •  cinacalcet  •  atorvastatin  •  omeprazole  •  metoprolol  •  ondansetron  •  ondansetron    Labs:  Recent Labs     08/06/19  0342 08/07/19  0259   WBC 3.1* 3.8*   RBC 3.65* 3.47*   HEMOGLOBIN 10.8* 10.5*   HEMATOCRIT 35.1* 32.9*   MCV 96.2 94.8   MCH 29.6 30.3   RDW 52.6* 51.7*   PLATELETCT 183 175   MPV 9.4 9.5   NEUTSPOLYS 40.20* 45.10   LYMPHOCYTES 38.20 35.80   MONOCYTES 14.60* 12.80   EOSINOPHILS 5.70 5.50   BASOPHILS 1.00 0.50     Recent Labs     08/06/19  0342 08/07/19  0259   SODIUM 139 138   POTASSIUM 4.3 4.0   CHLORIDE 109 106   CO2 23 25   GLUCOSE 97 96   BUN 23* 28*     Recent Labs     08/06/19  0342 08/07/19  0259   ALBUMIN 3.5 3.4   TBILIRUBIN 0.3 0.2   ALKPHOSPHAT 162* 197*   TOTPROTEIN 6.5 6.2   ALTSGPT 9 10   ASTSGOT 28 20   CREATININE 0.68 0.80       Imaging:  MRI on 7/14/2019  Impression:       1.  Innumerable nodular enhancing lesions throughout the brain parenchyma both the supra and infratentorial compartments predominantly near the gray-white junction. There has been interval increase in size of several of these lesions since previous exam.   These changes may be secondary to metastatic disease or granulomatous disease.    2.  Interval right parietal craniotomy with underlying elliptical postsurgical defect.    3.  Interval increase in size of index right thalamic lesion which has increased vasogenic edema since previous exam.    4.   Age-related cerebral atrophy.       Micro:  Results     Procedure Component Value Units Date/Time    Fungal Culture [[id number]] Collected:  06/19/19 0930    Order Status:  Completed Specimen:  Tissue Updated:  08/07/19 1321     Significant Indicator NEG     Source TISS     Site Right Hip deep bone     Culture Result No fungal growth.    Narrative:       CALL  [facility]  TEREZA tel. [phone],  Collected By:[id number] BEATRICE HARRIS  Bone biopsy right hip  Collected By:[id number] BEATRICE HARRIS  Right hip fluid collection  Collected By:[id number] BEATRICE HARRIS    AFB Culture [[id number]]  (Abnormal) Collected:  06/19/19 0930    Order Status:  Completed Specimen:  Tissue from Other Body Fluid Updated:  08/07/19 1321     Significant Indicator POS     Source TISS     Site Right Hip deep bone     Culture Result Acid fast bacilli detected by MGIT 960 instrument.  Identification to follow.       AFB Smear Results No acid fast bacilli seen.     Culture Result Mycobacterium tuberculosis complex  By DNA probe  Positive for M. tb complex  Negative for M. avium complex  Negative for M. gordonae  Negative for M. kansasii  Isoniazid     0.1 ug/mL   Resistant  Isoniazid     0.4 ug/mL   Resistant  Rifampin      1.0 ug/mL   Sensitive  Ethambutol    5.0 ug/mL   Sensitive  Pyrazinamide  100 ug/mL   Sensitive  Testing performed at:  [facility]  [address] [place] [id number] [phone]      Narrative:       CALL  [facility]  TEREZA tel. [phone],  Collected By:[id number] BEATRICE HARRIS  Bone biopsy right hip  Collected By:[id number] BEATRICE HARRIS  Right hip fluid collection  Collected By:[id number] BEATRICE HARRIS    Fungal Smear [[id number]] Collected:  06/19/19 0930    Order Status:  Completed Specimen:  Tissue from Other Body Fluid Updated:  08/07/19 1321     Significant Indicator NEG     Source TISS     Site Right Hip deep bone     Fungal Smear Results No fungal elements seen.    Narrative:       CALL  [facility]  TEREZA tel.  "[phone],  Collected By:[id number] BEATRICE MONTERO D  Bone biopsy right hip  Collected By:[id number] BEATRICE HARRIS  Right hip fluid collection  Collected By:[id number] BEATRICE HARRIS    CULTURE TISSUE W/ GRM STAIN [[id number]] Collected:  06/19/19 0930    Order Status:  Completed Specimen:  Tissue Updated:  08/07/19 1321     Significant Indicator NEG     Source TISS     Site Right Hip deep bone     Culture Result No growth at 72 hours.     Gram Stain Result No organisms seen.    Narrative:       CALL  [facility]  TEREZA tel. [phone],  Collected By:[id number] BEATRICE HARRIS  Bone biopsy right hip  Collected By:[id number] BEATRICE HARRIS  Right hip fluid collection  Collected By:[id number] BEATRICE HARRIS    Fungal Culture [[id number]] Collected:  07/11/19 1745    Order Status:  Completed Specimen:  Tissue Updated:  08/07/19 0955     Significant Indicator NEG     Source TISS     Site Right Hip Cores     Culture Result No fungal growth.    Narrative:       CALL  [facility]  TEREZA tel. [phone],  CALLED  TEREZA tel. [phone] 08/02/2019, 11:03, RB PERF. RESULTS CALLED  TO:[id number].    AFB Culture [[id number]]  (Abnormal) Collected:  07/11/19 1745    Order Status:  Completed Specimen:  Tissue Updated:  08/07/19 0955     Significant Indicator POS     Source TISS     Site Right Hip Cores     Culture Result Acid fast bacilli detected by MGIT 960 instrument.  Identification to follow.       AFB Smear Results No acid fast bacilli seen.    Narrative:       CALL  [facility]  TEREZA tel. [phone],  CALLED  TEREZA tel. [phone] 08/02/2019, 11:03, RB PERF. RESULTS CALLED  TO:[id number].          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 Decrease " "in the size of the lesions. Interval reduction in the extent of the surrounding white matter edema consistent with improvement/treatment response of the most of the multifocal brain abscesses.  MRI on 6/8 with interval progression of supra and infratentorial intra--axial nodules and associated edema.  New right thalamus lesion. Radiology favors infection over neoplasm (had been off abx)  Mult blood cultures neg; CHAS neg 3/15 and 5/24  MRI 6/22 worsening CNS lesions  S/p right craniotomy and resection of mass with biopsy on 6/28. Biopsy-per note fungal appearing elements seen per Neurosurgery (patient was just briefly on IV amphotericin B, but this turned out to be an incorrect read)-path   +necrotizing granulomas. All stains negative in EPIC  Fungal culture- negative to date  Bacterial cultures-negative to date  Brucella ab - negative  Coxiella ab - negative   Histo ab serum & antigen urine-negative  Blasto ab - negative   Repeat MRI on 7/14 + increase in in nodular enhancing lesions throughout the brain parenchyma  See below.  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 The collection within the right gluteal muscle has unchanged while the fluid collection within the right iliacis muscle is increased somewhat in size.\" 2.7 cm  Cultures 3/29 and 4/4 neg  MRI on 5/20/2019 - interval decrease in collection in R gluteal muscle and persistent multiloculated collection in " R iliacus muscle.   CT 5/28 no change  s/p drainage on 5/30/2019-cultures negative  S/p removal hardware 6/5-no cultures done  CT on 6/8 with residual abscess in the right iliacus muscle, 2.5 x 1.8 cm, slightly smaller than prior  s/p CT-guided deep bone biopsy 6/19/2019.  Cx +Mtb   Hip core +AFB  MRI 6/28 chronic discitis, diffuse OM L5, 3 and 4 cm abscesses right glute, 1.5 cm abscess or necrosis right posterior ileum, 3.3 cm abscess right SI joint  IR biopsy of R gluteal abscess/hip core tissue performed on 7/11, also with probe positive for MTB  Repeat MRI mid August along with brain as above  Discussed with [facility] on 8/8.  The organism is INH resistant.  Discussed with Dr. Wells.  Will discontinue isoniazid and Levaquin.  Continue rifampin, pyrazinamide, ethambutol for 9 to 12 months  Increased the dose of pyrazinamide to 25 mg/KG daily = 1250 mg/day per Dr. Wells's recommendations  Repeat CMP in a week  Following discharge, patient will need [facility] [place] [place]     Monitoring  CMP 8/7 with no concerning findings  Monitor for visual changes while on ethambutol    Patient has now been on ethambutol for more than a month.  Would request an exception and perform visual acuity and color testing while inpatient.  Or perhaps she could be wheeled to their location   Monitor closely for adverse effect secondary to fluoroquinolones since the plan is to use levofloxacin for several months    Type 2 DM, chronic  Hemoglobin A1c 6.3   Will adversely affect wound healing and resolution of infection    Discussed with primary, Dr. Devi    ID will follow every few days.  Please call with questions.

## 2019-08-08 NOTE — PROGRESS NOTES
Patient complained of right foot pain, flexeril administered. At 0230, patient requested a shower. Patient showered and fell back asleep. At 0222, patient given oxycodone for bilateral hip pain.

## 2019-08-08 NOTE — DISCHARGE PLANNING
Anticipated Discharge Disposition: SNF-[facility]    Action: LSW informed by Florinda CANO that pt was accepted by [facility] and can take pt tomorrow. LSW attempted to meet with pt and pt sleeping at this time.   LSW tc, brother Delonte and he stated that he spoke to her yesterday and she would like to go to [facility] if they accept.     Barriers to Discharge: None    Plan: LSW to f/u with transport. LSW to assist as needed.

## 2019-08-08 NOTE — DISCHARGE PLANNING
Agency/Facility Name: [facility]  Spoke To: Krunal  Outcome: Patient accepted. [place](BRIANA) notified.

## 2019-08-09 PROBLEM — H46.3: Status: ACTIVE | Noted: 2019-08-09

## 2019-08-09 PROBLEM — T50.905A: Status: ACTIVE | Noted: 2019-08-09

## 2019-08-09 PROCEDURE — 700101 HCHG RX REV CODE 250: Performed by: FAMILY MEDICINE

## 2019-08-09 PROCEDURE — 700102 HCHG RX REV CODE 250 W/ 637 OVERRIDE(OP): Performed by: INTERNAL MEDICINE

## 2019-08-09 PROCEDURE — 700102 HCHG RX REV CODE 250 W/ 637 OVERRIDE(OP): Performed by: FAMILY MEDICINE

## 2019-08-09 PROCEDURE — A9270 NON-COVERED ITEM OR SERVICE: HCPCS | Performed by: INTERNAL MEDICINE

## 2019-08-09 PROCEDURE — A9270 NON-COVERED ITEM OR SERVICE: HCPCS | Performed by: FAMILY MEDICINE

## 2019-08-09 PROCEDURE — 770001 HCHG ROOM/CARE - MED/SURG/GYN PRIV*

## 2019-08-09 PROCEDURE — 700111 HCHG RX REV CODE 636 W/ 250 OVERRIDE (IP): Performed by: INTERNAL MEDICINE

## 2019-08-09 PROCEDURE — 99231 SBSQ HOSP IP/OBS SF/LOW 25: CPT | Performed by: INTERNAL MEDICINE

## 2019-08-09 PROCEDURE — 99232 SBSQ HOSP IP/OBS MODERATE 35: CPT | Performed by: INTERNAL MEDICINE

## 2019-08-09 PROCEDURE — 99221 1ST HOSP IP/OBS SF/LOW 40: CPT | Performed by: OPHTHALMOLOGY

## 2019-08-09 RX ADMIN — PRAMIPEXOLE DIHYDROCHLORIDE 0.25 MG: 0.5 TABLET ORAL at 20:32

## 2019-08-09 RX ADMIN — Medication 1 CAPSULE: at 07:47

## 2019-08-09 RX ADMIN — PYRAZINAMIDE 1000 MG: 500 TABLET ORAL at 05:00

## 2019-08-09 RX ADMIN — SENNOSIDES, DOCUSATE SODIUM 2 TABLET: 50; 8.6 TABLET, FILM COATED ORAL at 17:24

## 2019-08-09 RX ADMIN — CINACALCET HYDROCHLORIDE 60 MG: 30 TABLET, COATED ORAL at 05:00

## 2019-08-09 RX ADMIN — ACETAMINOPHEN 1000 MG: 500 TABLET ORAL at 11:42

## 2019-08-09 RX ADMIN — AMLODIPINE BESYLATE 10 MG: 5 TABLET ORAL at 05:01

## 2019-08-09 RX ADMIN — HEPARIN SODIUM 5000 UNITS: 5000 INJECTION, SOLUTION INTRAVENOUS; SUBCUTANEOUS at 17:25

## 2019-08-09 RX ADMIN — LIDOCAINE 2 PATCH: 50 PATCH CUTANEOUS at 11:41

## 2019-08-09 RX ADMIN — HEPARIN SODIUM 5000 UNITS: 5000 INJECTION, SOLUTION INTRAVENOUS; SUBCUTANEOUS at 05:00

## 2019-08-09 RX ADMIN — MAGNESIUM 64 MG (MAGNESIUM CHLORIDE) TABLET,DELAYED RELEASE 64 MG: at 17:26

## 2019-08-09 RX ADMIN — LOSARTAN POTASSIUM 75 MG: 50 TABLET ORAL at 05:01

## 2019-08-09 RX ADMIN — OXYCODONE HYDROCHLORIDE 5 MG: 5 TABLET ORAL at 11:42

## 2019-08-09 RX ADMIN — GABAPENTIN 400 MG: 400 CAPSULE ORAL at 05:01

## 2019-08-09 RX ADMIN — CYCLOBENZAPRINE 10 MG: 10 TABLET, FILM COATED ORAL at 05:36

## 2019-08-09 RX ADMIN — ACETAMINOPHEN 1000 MG: 500 TABLET ORAL at 17:24

## 2019-08-09 RX ADMIN — METOPROLOL TARTRATE 25 MG: 25 TABLET, FILM COATED ORAL at 05:01

## 2019-08-09 RX ADMIN — ACETAMINOPHEN 1000 MG: 500 TABLET ORAL at 05:00

## 2019-08-09 RX ADMIN — FERROUS SULFATE TAB 325 MG (65 MG ELEMENTAL FE) 325 MG: 325 (65 FE) TAB at 07:46

## 2019-08-09 RX ADMIN — GABAPENTIN 400 MG: 400 CAPSULE ORAL at 17:24

## 2019-08-09 RX ADMIN — MAGNESIUM 64 MG (MAGNESIUM CHLORIDE) TABLET,DELAYED RELEASE 64 MG: at 05:00

## 2019-08-09 RX ADMIN — ATORVASTATIN CALCIUM 10 MG: 10 TABLET, FILM COATED ORAL at 17:25

## 2019-08-09 RX ADMIN — OXYCODONE HYDROCHLORIDE 5 MG: 5 TABLET ORAL at 05:01

## 2019-08-09 RX ADMIN — POTASSIUM CHLORIDE 20 MEQ: 20 TABLET, EXTENDED RELEASE ORAL at 05:00

## 2019-08-09 RX ADMIN — TEMAZEPAM 15 MG: 15 CAPSULE ORAL at 20:32

## 2019-08-09 RX ADMIN — GABAPENTIN 400 MG: 400 CAPSULE ORAL at 11:42

## 2019-08-09 RX ADMIN — LEVETIRACETAM 500 MG: 500 TABLET, FILM COATED ORAL at 05:01

## 2019-08-09 RX ADMIN — LEVETIRACETAM 500 MG: 500 TABLET, FILM COATED ORAL at 17:24

## 2019-08-09 RX ADMIN — CYCLOBENZAPRINE 10 MG: 10 TABLET, FILM COATED ORAL at 20:32

## 2019-08-09 RX ADMIN — OMEPRAZOLE 20 MG: 20 CAPSULE, DELAYED RELEASE ORAL at 05:01

## 2019-08-09 RX ADMIN — PRAMIPEXOLE DIHYDROCHLORIDE 0.25 MG: 0.5 TABLET ORAL at 07:47

## 2019-08-09 RX ADMIN — PYRAZINAMIDE 250 MG: 500 TABLET ORAL at 05:02

## 2019-08-09 RX ADMIN — METOPROLOL TARTRATE 25 MG: 25 TABLET, FILM COATED ORAL at 17:28

## 2019-08-09 RX ADMIN — PYRIDOXINE HCL TAB 50 MG 100 MG: 50 TAB at 05:01

## 2019-08-09 RX ADMIN — RIFAMPIN 600 MG: 300 CAPSULE ORAL at 05:00

## 2019-08-09 RX ADMIN — PRAMIPEXOLE DIHYDROCHLORIDE 0.25 MG: 0.5 TABLET ORAL at 15:09

## 2019-08-09 RX ADMIN — ETHAMBUTOL HYDROCHLORIDE 800 MG: 400 TABLET, FILM COATED ORAL at 05:00

## 2019-08-09 ASSESSMENT — ENCOUNTER SYMPTOMS
ABDOMINAL PAIN: 0
SPEECH CHANGE: 0
NERVOUS/ANXIOUS: 0
NECK PAIN: 0
BLURRED VISION: 0
SHORTNESS OF BREATH: 0
PHOTOPHOBIA: 0
HEMOPTYSIS: 0
POLYDIPSIA: 0
ORTHOPNEA: 0
DIARRHEA: 0
BACK PAIN: 0
FLANK PAIN: 0
HEADACHES: 0
SPUTUM PRODUCTION: 0
TREMORS: 0
DIZZINESS: 0
VOMITING: 0
DOUBLE VISION: 0
FEVER: 0
BRUISES/BLEEDS EASILY: 0
HALLUCINATIONS: 0
COUGH: 0
NAUSEA: 0
WEIGHT LOSS: 0
HEARTBURN: 0
FOCAL WEAKNESS: 0
PALPITATIONS: 0
CHILLS: 0

## 2019-08-09 ASSESSMENT — PAIN SCALES - WONG BAKER: WONGBAKER_NUMERICALRESPONSE: HURTS JUST A LITTLE BIT

## 2019-08-09 ASSESSMENT — LIFESTYLE VARIABLES: SUBSTANCE_ABUSE: 0

## 2019-08-09 NOTE — PROGRESS NOTES
Spoke with the charge nurse and informed him that I do not see and order for the ophthalmologist consult. Charge nurse states he will reach out to the doctor to find out what's going on. Discharge could be potentially delayed until tomorrow.

## 2019-08-09 NOTE — DISCHARGE PLANNING
Anticipated Discharge Disposition: SNF-[facility]    Action: LSW informed by Florinda CANO that transport set up for 1500. LSW tc brother, Delonte. Delonte aware of transport time. Delonte stated that himself and his brotherRené would be travelling to [place] this weekend to visit pt.     Barriers to Discharge: None    Plan: LSW to assist as needed.

## 2019-08-09 NOTE — PROGRESS NOTES
[facility] Daily Progress Note    Date of Service  8/8/2019    Chief Complaint  68 y.o. female admitted 5/29/2019 with right iliac and gluteus abscess    Hospital Course  Patient is a 68 year old with history of breast cancer, copd, DM, gerd, dl and htn who was admitted with right iliac gluteus abscess.  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    Interval Problem Update  Patient seen and evaluated on rounds  Discussed with nursing staff on rounds  No complaints  Awaiting postacute placement to SNF     Consultants/Specialty  Infectious disease  Neurosurgery  Orthopedics    Code Status  DNR/DNI    Disposition  Awaiting postacute placement to skilled nursing facility in [place], discussed with /case management on rounds    I provided my personal cell phone number for the skilled nursing facility physician to get in touch with me if they would like to discuss physician to physician sign out    Review of Systems  Review of Systems   Constitutional: Negative for chills, fever and weight loss.   HENT: Negative for ear pain, hearing loss and tinnitus.    Eyes: Negative for blurred vision, double vision and photophobia.   Respiratory: Negative for cough, hemoptysis and sputum production.    Cardiovascular: Negative for chest pain, palpitations and orthopnea.   Gastrointestinal: Negative for heartburn, nausea and vomiting.   Genitourinary: Negative for dysuria, flank pain, frequency and hematuria.   Musculoskeletal: Positive for joint pain. Negative for back pain and neck pain.   Skin: Negative for itching and rash.   Neurological: Negative for tremors, speech change, focal weakness and headaches.   Endo/Heme/Allergies: Negative for environmental allergies and polydipsia. Does not bruise/bleed easily.   Psychiatric/Behavioral: Negative for hallucinations and substance abuse. The  patient is not nervous/anxious.         Physical Exam  Temp:  [36.2 °C (97.1 °F)-37.3 °C (99.2 °F)] 37.3 °C (99.2 °F)  Pulse:  [61-84] 65  Resp:  [16-19] 19  BP: (109-148)/(54-85) 111/67  SpO2:  [94 %-98 %] 98 %    Physical Exam   Constitutional: She is oriented to person, place, and time. She appears well-developed and well-nourished.   HENT:   Head: Normocephalic and atraumatic.   Eyes: Pupils are equal, round, and reactive to light. EOM are normal.   Neck: Normal range of motion. Neck supple.   Cardiovascular: Normal rate, regular rhythm and normal heart sounds.   Pulmonary/Chest: Effort normal and breath sounds normal.   Abdominal: Soft. Bowel sounds are normal.   Musculoskeletal: Normal range of motion.   Tenderness to palpation  Of the left buttock   Neurological: She is alert and oriented to person, place, and time.   Skin: Skin is warm and dry.   Psychiatric: She has a normal mood and affect. Cognition and memory are impaired.   Nursing note and vitals reviewed.    Unchanged from yesterday     Fluids  No intake or output data in the 24 hours ending 08/08/19 1805    Laboratory  Recent Labs     08/06/19  0342 08/07/19  0259   WBC 3.1* 3.8*   RBC 3.65* 3.47*   HEMOGLOBIN 10.8* 10.5*   HEMATOCRIT 35.1* 32.9*   MCV 96.2 94.8   MCH 29.6 30.3   MCHC 30.8* 31.9*   RDW 52.6* 51.7*   PLATELETCT 183 175   MPV 9.4 9.5     Recent Labs     08/06/19  0342 08/07/19  0259   SODIUM 139 138   POTASSIUM 4.3 4.0   CHLORIDE 109 106   CO2 23 25   GLUCOSE 97 96   BUN 23* 28*   CREATININE 0.68 0.80   CALCIUM 9.9 9.1                   Imaging  DX-HIP-UNILATERAL-WITH PELVIS-1 VIEW LEFT   Final Result      No LEFT hip or pelvic fracture.      IR-INJ-EXT PSEUDOANEURYSM PERC   Final Result      1.  Ultrasound guided thrombin injection for treatment of a RIGHT gluteal 3.6 cm pseudoaneurysm.      US-EXTREMITY ARTERY LOWER UNILAT RIGHT   Final Result      Right gluteal pseudoaneurysm measuring 3.65 x 2.24 x 2.31 cm.      CTA ABDOMEN PELVIS  W & W/O POST PROCESS   Final Result      1.  Interval enlargement of a hyperdense ovoid masslike structure immediately posterior to the right ilium, possibly representing a pseudoaneurysm.   2.  Stable thin-walled apparent fluid-filled structure immediately deep to the right ilium.   3.   Minimal retroperitoneal adenopathy, grossly stable.   4.  Cholelithiasis.      MR-BRAIN-WITH & W/O   Final Result      1.  Innumerable nodular enhancing lesions throughout the brain parenchyma both the supra and infratentorial compartments predominantly near the gray-white junction. There has been interval increase in size of several of these lesions since previous exam.    These changes may be secondary to metastatic disease or granulomatous disease.      2.  Interval right parietal craniotomy with underlying elliptical postsurgical defect.      3.  Interval increase in size of index right thalamic lesion which has increased vasogenic edema since previous exam.      4.  Age-related cerebral atrophy.      IR-PICC LINE PLACEMENT W/ GUIDANCE > AGE 5   Final Result                  Ultrasound-guided PICC placement performed by qualified nursing staff as    above.          RR-VBRANNY-3 VIEW   Final Result         1.  Moderate stool in the colon suggests changes of constipation, otherwise nonspecific bowel gas pattern   2.  Atherosclerosis      CT-NEEDLE BX-ABD-RETROPERITONL   Final Result      1.  CT GUIDED biopsy of a right pelvic phlegmon.      2. Significant interval enlargement of a right gluteal pseudoaneurysm. CTA and consideration for possible intervention either with direct thrombin injection or endovascular treatment was suggested. This was conveyed to Dr. Alatorre on 7/11/2019 via [place]    text at 1750 hours.      US-EXTREMITY VENOUS LOWER BILAT   Final Result      IR-US GUIDED PIV   Final Result    Ultrasound-guided PERIPHERAL IV INSERTION performed by    qualified nursing staff as above.            MR-LUMBAR SPINE-WITH & W/O    Final Result         1.  Grade 2-3 spondylolisthesis at the L5-S1 level with bilateral spondylolysis of the pars interarticularis. This causes severe bilateral neural foraminal stenosis at this level.      2.  Interval removal of posterior fusion hardware at the lumbosacral junction.      3.  Diffuse osteomyelitis involving the L5 vertebral body and the first 3 sacral segments.      4.  Chronic discitis at the L5-S1 level with anterior paraspinous abscess at this level and anterior to the S1 sacral segment.      5.  Smaller intraosseous bone bone abscesses or areas of necrosis noted in the sacrum.      6.  There is also evidence of osteomyelitis involving the jose antonio and sacrum bilaterally along the course of the previous sacral fixation screw. There is a large 4 cm abscess noted in the right gluteal soft tissues in a smaller 3 cm chronic appearing    abscess in the left gluteal musculature.      7.  There is a 1.5 cm intraosseous abscess or area of necrosis in the right posterior ilium.      8.  There is a 3.3 cm centimeters multiloculated abscess anterior to the right sacroiliac joint.      MR-STEALTH BRAIN WITH & W/O   Final Result         1.  Innumerable subcentimeter brain metastases, many at the gray-white junction but also multiple noted throughout the basal ganglia and brainstem.      2.  Largest index lesion is noted in the right thalamus and has increased in size since previous exam and currently measures 9 mm and has a moderate amount of surrounding vasogenic edema.      US-EXTREMITY NON VASCULAR UNILATERAL RIGHT   Final Result      No right hip joint effusion. No soft tissue fluid collection.      MR-BRAIN-WITH & W/O   Final Result      1.  Innumerable supra and infratentorial enhancing nodules are again noted throughout the brain parenchyma with multiple lesions appearing somewhat larger and with increased enhancement. No associated diffusion restriction. Unchanged differential    considerations  including bacterial or fungal infection versus metastatic disease.   2.  Mild diffuse cerebral substance loss.   3.  Mild microangiopathic ischemic change versus demyelination or gliosis.      CT-NEEDLE BX-DEEP BONE   Final Result      CT GUIDED RIGHT ILIUM DEEP BONE BIOPSY. SAMPLES WERE SENT TO MICROBIOLOGY FOR ANALYSIS.      CT-PELVIS WITH   Final Result         1.  No significant interval change in the size of the RIGHT iliac muscle fluid collections   2.  Hyperdense RIGHT gluteal lesion moderately suspicious for pseudoaneurysm with adjacent hematoma.   3.  Changes in the RIGHT iliac bone, BILATERAL sacrum and LEFT iliac bone suspicious for osteomyelitis   4.  Changes at L5-S1 suspicious for discitis osteomyelitis      CT-PELVIS WITH   Final Result      1.  Residual or recurrent abscess within the right iliacus muscle measuring 2.5 x 1.8 cm. It slightly smaller than on 5/28/2019      2.  Interval removal of hardware from the iliac bones and sacrum      3.  Lytic change of the right iliac bone and the sacrum. This could be related to hardware versus osteomyelitis.      4.  Subacute fractures of the right ilium, sacrum, and there is lucency within the left iliac bone that is likely from hardware      MR-BRAIN-WITH & W/O   Final Result      1.  Interval progression of supra and infratentorial intra-axial nodules and associated edema. This short-term interval progression favors infection over neoplasm though both remain considerations.   2.  Mild atrophy      DX-PORTABLE FLUOROSCOPY < 1 HOUR   Final Result         Portable fluoroscopy utilized for 2 minutes.      DX-PELVIS-1 OR 2 VIEWS   Final Result      Status post hardware removal from the right SI joint      DX-CHEST-PORTABLE (1 VIEW)   Final Result      Interstitial prominence could be due to pulmonary edema or hypoventilatory change.      DX-CHEST-LIMITED (1 VIEW)   Final Result      LEFT basilar underinflation atelectasis or pneumonia      CT-DRAIN-HEMATOMA -  SEROMA   Final Result      CT-guided RIGHT pelvic fluid collection aspiration, laboratory evaluation pending              Assessment/Plan  * Brain lesion- (present on admission)  Assessment & Plan  Right-sided craniotomy for resection of superficial mass  6/28/2019  Continue Isoniazid, Rifampin, Ethambutol, Pyrazinamide per ID, discussed with ID they have reached out to [facility] to discuss outpatient regimen  Continue Keppra for seizure prophylaxis, per ID consider restarting steroids if she has breakthrough seizures  No seizures observed    Granulomatous disease - (present on admission)  Assessment & Plan  Continue management per infectious disease team    Abscess of right iliac muscle and right gluteus medius muscle- (present on admission)  Assessment & Plan  CT guided pelvic aspiration 5/30/2019  Hardware removal, pelvis 6/5/2019  CT guided deep bone biopsy 6/19/2019  CT guided aspiration/biopsy of a R gluteal fluid collection/phlegmon 7/11/2019 - consistent with TB  Continue Isoniazid, Rifampin, Ethambutol, Pyrazinamide  Associated pain improving  Levaquin to continue for 9-12 months    Continue antibiotic, guidance of antibiotic therapy per infectious disease team      Osteomyelitis (HCC)- (present on admission)  Assessment & Plan  Disseminated tuberculosis, infectious disease following and managing    Pseudoaneurysm following procedure (HCC)- (present on admission)  Assessment & Plan  s/p IR thrombin injection for thrombosed psedudoanerysm    Hypomagnesemia- (present on admission)  Assessment & Plan  Oral magnesium    Dysphagia- (present on admission)  Assessment & Plan  Diet per SLP recommendations    Essential hypertension- (present on admission)  Assessment & Plan  Continue Metoprolol, Losartan and Amlodipine as tolerated  Blood pressure stable    COPD (chronic obstructive pulmonary disease) (HCC)- (present on admission)  Assessment & Plan  No exacerbation  RT protocol    Non-severe protein-calorie  malnutrition (HCC)- (present on admission)  Assessment & Plan  Continue to optimize nutrition      History of DVT (deep vein thrombosis)- (present on admission)  Assessment & Plan  Xarelto was held due to hip hematoma and rifampin  Consider starting coumadin in 1  weeks if h/h remains stable    RLS (restless legs syndrome)- (present on admission)  Assessment & Plan  Pramipexole    Chronic pain- (present on admission)  Assessment & Plan  Consider opioid sparing therapy    History of breast cancer- (present on admission)  Assessment & Plan  history left mastectomy and breast implant.       I personally discussed the case with Dr. Burrows, he is reviewing the case with Dr. Wells further from infectious disease with respect to INH resistant and will provide further recommendations, also will have consultation from ophthalmology with respect to eye examination tomorrow given use of ethambutol.      VTE prophylaxis: Heparin

## 2019-08-09 NOTE — CARE PLAN
Problem: Safety  Goal: Will remain free from falls  Outcome: PROGRESSING AS EXPECTED   Bed alarm on, bed locked in lowest position, upper side rails up, call light and personal items within reach, non skids socks on.   Problem: Knowledge Deficit  Goal: Knowledge of the prescribed therapeutic regimen will improve  Outcome: PROGRESSING SLOWER THAN EXPECTED   POC discussed with patient, all questions answered.

## 2019-08-09 NOTE — PROGRESS NOTES
Assumed pt care at 1900 and received bedside report.    Pt alert and oriented X 4 with intermittent confusion. Pt denies chest pain, sob, nausea and vomiting, headache, and blurry or double vision. Pt denies numbness and tingling. Pt c/o spasms to legs, medicating per MAR. Pt ambulating x1 with FWW. Pt incontinent at times.   POC discussed and education provided on administered medications. All questions and concerns addressed. Fall precautions, hourly rounding and Q4 hour neuro checks in place.     2130: Report given to PATRICK Vickers.

## 2019-08-09 NOTE — DISCHARGE PLANNING
Agency/Facility Name: [facility]  Spoke To: Vanda  Outcome: Transport cancelled. Per Adenike Dc able to arrange transport tomorrow if patient is medically cleared. [place](BRIANA) notified.

## 2019-08-09 NOTE — PROGRESS NOTES
Transfer of care from PATRICK Mota. Introduced self to patient. Alertx2-3 with periods of confusion. Reoriented as necessary. Patient with episode of urinary incontinencex1 on this shift, cleaned and kept comfortable afterwards. Slept on and off in between care. No adverse events. Will continue to monitor    Patient had another episode of urinary incontinence with am care, linens changed. C/o pain to left hip and leg, muscle relaxer and prn pain medication given with good relief. All needs tended to

## 2019-08-09 NOTE — PROGRESS NOTES
Infectious Disease Progress Note    Author: Ashely Hinojosa M.D. Date & Time of service: 8/9/2019  10:46 AM    Chief Complaint:  Brain abscess, gluteal abscess  Vertebral OM     Interval History:  68 y.o. female admitted 5/29/2019 as a transfer from [facility] since 4/30/2019. + diabetes, SANTOSH of right hip with hardware, and breast cancer who was originally admitted to [facility] on 03/08/2019 for right hip and pelvic pain.  Work-up revealed a right iliopsoas lesion, gluteal abscess, and mult brain abscesses     7/23/2019-no fevers.  Ongoing pain.  The AFB cultures have come back positive for TB  7/24/2019 no fevers.  No new issues overnight.  Very anxious and agitated  7/25/2019 no fevers.  No new issues.  Says she feels slightly better.  7/26 AF WBC 4.5 tolerating meds well-wants cream to decrease size of surgical scar-has right-sided rib pain and joint pain  7/27 AF ambulating with assist with walker-no new complaints  7/28 AF same pain complaint-remains debilitated with intermittent confusion  7/29 AF WBC 4.2 no clinical change  7/30 AF WBC 3.3 sleeping sounding-dw RN has been complaining of hip pain but able to ambulate  7/31 AF WBC 3 ambulating in peng with rolling walker-NAD. No new complaints  8/1 AF knitting in bed-looks comfortable but requesting more pain meds  8/2 AF No CBC no new complaints  8/3 AF tolerating PO-no rash Same pain  8/5 afebrile, no CBC, continues to report pain, no new medications, tolerating antibiotics.  8/6 afebrile, white count 3.1.  No new issues, tolerating antibiotics.  8/8 afebrile, white count 3.8 on 8/7.  No concerns on CMP.  Reports no issues with vision, solving sudoku this morning.  Spoke with health department.  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  8/9 afebrile, lying comfortably in bed.  Patient states she is going to a skilled nursing facility today however according to hospitalist this is not the case.  Still awaiting ophthalmology examination    Review of  Systems:  Review of Systems   Constitutional: Negative for chills and fever.   Eyes: Negative for blurred vision and double vision.   Respiratory: Negative for cough and shortness of breath.    Cardiovascular: Negative for chest pain.   Gastrointestinal: Negative for abdominal pain, diarrhea, nausea and vomiting.   Genitourinary: Negative for dysuria.   Musculoskeletal: Positive for joint pain.   Skin: Negative for itching and rash.   Neurological: Negative for dizziness.   All other systems reviewed and are negative.      Hemodynamics:  Temp (24hrs), Av.9 °C (98.5 °F), Min:36.7 °C (98.1 °F), Max:37.3 °C (99.2 °F)  Temperature: 36.9 °C (98.4 °F)  Pulse  Av  Min: 49  Max: 195   Blood Pressure : 130/61       Physical Exam:  Physical Exam   Constitutional: No distress.   Elderly  Thin and frail     HENT:   Mouth/Throat: Oropharynx is clear and moist. No oropharyngeal exudate.   Right parietal surgical site healed   Eyes: Pupils are equal, round, and reactive to light. Right eye exhibits no discharge. Left eye exhibits no discharge.   Neck: Neck supple. No JVD present.   Cardiovascular: Normal rate and regular rhythm.   Pulmonary/Chest: Effort normal. No stridor. She has no wheezes. She has no rales.   Abdominal: Soft. There is no rebound and no guarding.   Musculoskeletal: She exhibits no edema.   Neurological: She is alert. Coordination normal.   Skin: Skin is warm. No rash noted. She is not diaphoretic. No erythema.   Nursing note and vitals reviewed.      Meds:    Current Facility-Administered Medications:   •  pyrazinamide  •  oxyCODONE immediate-release  •  acetaminophen  •  magnesium chloride  •  hyoscyamine-maalox plus-lidocaine viscous  •  riFAMPin  •  thrombin  •  temazepam  •  potassium chloride SA  •  losartan  •  pramipexole  •  senna-docusate  •  gabapentin  •  ferrous sulfate  •  diphenhydrAMINE  •  heparin  •  pyridoxine  •  levETIRAcetam  •  ethambutol  •  pyrazinamide  •  Pharmacy Consult  Request  •  labetalol  •  lactobacillus rhamnosus  •  lidocaine  •  cyclobenzaprine  •  Respiratory Care per Protocol  •  amLODIPine  •  cinacalcet  •  atorvastatin  •  omeprazole  •  metoprolol  •  ondansetron  •  ondansetron    Labs:  Recent Labs     08/07/19 0259   WBC 3.8*   RBC 3.47*   HEMOGLOBIN 10.5*   HEMATOCRIT 32.9*   MCV 94.8   MCH 30.3   RDW 51.7*   PLATELETCT 175   MPV 9.5   NEUTSPOLYS 45.10   LYMPHOCYTES 35.80   MONOCYTES 12.80   EOSINOPHILS 5.50   BASOPHILS 0.50     Recent Labs     08/07/19 0259   SODIUM 138   POTASSIUM 4.0   CHLORIDE 106   CO2 25   GLUCOSE 96   BUN 28*     Recent Labs     08/07/19 0259   ALBUMIN 3.4   TBILIRUBIN 0.2   ALKPHOSPHAT 197*   TOTPROTEIN 6.2   ALTSGPT 10   ASTSGOT 20   CREATININE 0.80       Imaging:  MRI on 7/14/2019  Impression:       1.  Innumerable nodular enhancing lesions throughout the brain parenchyma both the supra and infratentorial compartments predominantly near the gray-white junction. There has been interval increase in size of several of these lesions since previous exam.   These changes may be secondary to metastatic disease or granulomatous disease.    2.  Interval right parietal craniotomy with underlying elliptical postsurgical defect.    3.  Interval increase in size of index right thalamic lesion which has increased vasogenic edema since previous exam.    4.  Age-related cerebral atrophy.       Micro:  Results     Procedure Component Value Units Date/Time    Fungal Culture [[id number]] Collected:  06/19/19 0930    Order Status:  Completed Specimen:  Tissue Updated:  08/07/19 1321     Significant Indicator NEG     Source TISS     Site Right Hip deep bone     Culture Result No fungal growth.    Narrative:       CALL  [place]  TEREZA tel. [phone],  Collected By:[id number] BEATRICE HARRIS  Bone biopsy right hip  Collected By:[id number] BEATRICE HARRIS  Right hip fluid collection  Collected By:[id number] BEATRICE HARRIS    AFB Culture [[id number]]  (Abnormal) Collected:   06/19/19 0930    Order Status:  Completed Specimen:  Tissue from Other Body Fluid Updated:  08/07/19 1321     Significant Indicator POS     Source TISS     Site Right Hip deep bone     Culture Result Acid fast bacilli detected by MGIT 960 instrument.  Identification to follow.       AFB Smear Results No acid fast bacilli seen.     Culture Result Mycobacterium tuberculosis complex  By DNA probe  Positive for M. tb complex  Negative for M. avium complex  Negative for M. gordonae  Negative for M. kansasii  Isoniazid     0.1 ug/mL   Resistant  Isoniazid     0.4 ug/mL   Resistant  Rifampin      1.0 ug/mL   Sensitive  Ethambutol    5.0 ug/mL   Sensitive  Pyrazinamide  100 ug/mL   Sensitive  Testing performed at:  [facility]  [facility] [place] [address] [phone]      Narrative:       CALL  [place]  TEREZA tel. [phone],  Collected By:[id number] BEATRICE HARRIS  Bone biopsy right hip  Collected By:[id number] BEATRICE HRARIS  Right hip fluid collection  Collected By:[id number] BEATRICE HARRIS    Fungal Smear [[id number]] Collected:  06/19/19 0930    Order Status:  Completed Specimen:  Tissue from Other Body Fluid Updated:  08/07/19 1321     Significant Indicator NEG     Source TISS     Site Right Hip deep bone     Fungal Smear Results No fungal elements seen.    Narrative:       CALL  [place]  TEREZA tel. [phone],  Collected By:[id number] BEATRICE HARRIS  Bone biopsy right hip  Collected By:[id number] BEATRICE HARRIS  Right hip fluid collection  Collected By:[id number] BEATRICE HARRIS    CULTURE TISSUE W/ GRM STAIN [[id number]] Collected:  06/19/19 0930    Order Status:  Completed Specimen:  Tissue Updated:  08/07/19 1321     Significant Indicator NEG     Source TISS     Site Right Hip deep bone     Culture Result No growth at 72 hours.     Gram Stain Result No organisms seen.    Narrative:       CALL  [place]  TEREZA tel. [phone],  Collected By:[id number] BEATRICE HARRIS  Bone biopsy right hip  Collected By:[id number] BARRON  "WINSTON HARRIS  Right hip fluid collection  Collected By:[id number] BEATRICE HARRIS    Fungal Culture [[id number]] Collected:  07/11/19 1745    Order Status:  Completed Specimen:  Tissue Updated:  08/07/19 0955     Significant Indicator NEG     Source TISS     Site Right Hip Cores     Culture Result No fungal growth.    Narrative:       CALL  [place]  TEREZA tel. [phone],  CALLED  TEREZA tel. [phone] 08/02/2019, 11:03, RB PERF. RESULTS CALLED  TO:[id number].    AFB Culture [[id number]]  (Abnormal) Collected:  07/11/19 1745    Order Status:  Completed Specimen:  Tissue Updated:  08/07/19 0955     Significant Indicator POS     Source TISS     Site Right Hip Cores     Culture Result Acid fast bacilli detected by MGIT 960 instrument.  Identification to follow.       AFB Smear Results No acid fast bacilli seen.    Narrative:       CALL  [place]  TEREZA tel. [phone],  CALLED  TEREZA tel. [phone] 08/02/2019, 11:03, RB PERF. RESULTS CALLED  TO:[id number].          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 Decrease in the size of the lesions. Interval reduction in the extent of the surrounding white matter edema consistent with improvement/treatment response of the most of the multifocal brain abscesses.  MRI on 6/8 with interval progression of supra and infratentorial intra--axial nodules and associated edema.  New right thalamus lesion. Radiology favors infection over neoplasm (had been off abx)  Mult blood cultures neg; CHAS neg 3/15 and 5/24  MRI 6/22 worsening CNS lesions  S/p right craniotomy and resection of mass with biopsy on 6/28. Biopsy-per note fungal appearing elements seen per Neurosurgery " "(patient was just briefly on IV amphotericin B, but this turned out to be an incorrect read)-path   +necrotizing granulomas. All stains negative in EPIC  Fungal culture- negative to date  Bacterial cultures-negative to date  Brucella ab - negative  Coxiella ab - negative   Histo ab serum & antigen urine-negative  Blasto ab - negative   Repeat MRI on 7/14 + increase in in nodular enhancing lesions throughout the brain parenchyma  See below.  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 The collection within the right gluteal muscle has unchanged while the fluid collection within the right iliacis muscle is increased somewhat in size.\" 2.7 cm  Cultures 3/29 and 4/4 neg  MRI on 5/20/2019 - interval decrease in collection in R gluteal muscle and persistent multiloculated collection in R iliacus muscle.   CT 5/28 no change  s/p drainage on 5/30/2019-cultures negative  S/p removal hardware 6/5-no cultures done  CT on 6/8 with residual abscess in the right iliacus muscle, 2.5 x 1.8 cm, slightly smaller than prior  s/p CT-guided deep bone biopsy 6/19/2019.  Cx +Mtb   Hip core +AFB  MRI 6/28 chronic discitis, diffuse OM L5, 3 and 4 cm abscesses right glute, 1.5 cm abscess or necrosis right posterior ileum, 3.3 cm abscess right SI joint  IR biopsy of R gluteal abscess/hip core tissue performed on 7/11, also with probe positive for MTB  Repeat MRI mid August along with brain as " above  Discussed with [facility] on 8/8.  The organism is INH resistant.  Discussed with Dr. Wells.  Will discontinue isoniazid and Levaquin.  Continue rifampin, pyrazinamide, ethambutol for 9 to 12 months  Increased the dose of pyrazinamide to 25 mg/KG daily = 1250 mg/day per Dr. Wells's recommendations  Repeat CMP in a week  Following discharge, patient will need to be followed up by the [facility] department of [facility] in [place], [place]     Monitoring  CMP 8/7 with no concerning findings  Monitor for visual changes while on ethambutol    Patient has now been on ethambutol for more than a month.  Would request an exception and perform visual acuity and color testing while inpatient.  Or perhaps she could be wheeled to their location   Monitor closely for adverse effect secondary to fluoroquinolones since the plan is to use levofloxacin for several months    Type 2 DM, chronic  Hemoglobin A1c 6.3   Will adversely affect wound healing and resolution of infection    Discussed with primary, Dr. Devi

## 2019-08-09 NOTE — DISCHARGE PLANNING
Anticipated Discharge Disposition: [facility]    Action: LSW faxed transport form to Flor[facility].     Barriers to Discharge: None    Plan: Await transport time. LSW assist as needed.

## 2019-08-09 NOTE — ASSESSMENT & PLAN NOTE
8/9/2019 - MBS - No clinical evidence of Ethambutol optic neuropathy. Vision and color vision excellent. Optic nerves pink without areas of atrophy. In addition no chorioretinal lesions noted. Would like to evaluate in 3 months as an outpatient [address] Neuro-ophthalmology. At that time can also obtain OCT evaluating the nerve fiber and ganglion cell thickness.

## 2019-08-09 NOTE — DISCHARGE PLANNING
Received Transport Form @ [id number]  Spoke to Vanda @ [facility]    Transport is scheduled for 8/9 @ 1500 going to [facility]. [place](BRIANA) notified of transport time.

## 2019-08-09 NOTE — PROGRESS NOTES
Peds/Neuro Ophthalmology:   Sheng Antonio M.D.    Date & Time note created:    8/9/2019   1:27 PM     Referring MD / APRN:  Pcp Unknown, Rudi Devi M.D.    Patient ID:  Name:             Muriel Martini   YOB: 1950  Age:                 68 y.o.  female   MRN:               [id number]    Chief Complaint/Reason for Visit:     No chief complaint on file.      History of Present Illness:    Muriel Martini is a 68 y.o. female   Asked to evaluate for an opthalmic examination this 68 year old who was discovered to have innumerable nodular enhancing lesions throughout the brain parenchyma both the supra and infratentorial compartments predominantly near the gray-white junction. These were biopsied and diagnosis is CNS TB. Because of resistance was begun on triple triple therapy including Ethambutol. Has been on for over a month and asked to access optic nerve function. Has not complainants of change in her vision, no coloration, and change in color vision. Does wear glasses with progressive. Current glasses are about a year old.       Review of Systems:  ROS    Past Medical History:   Past Medical History:   Diagnosis Date   • Cancer (HCC)    • COPD (chronic obstructive pulmonary disease) (HCC)    • Diabetes (HCC)     Record received from [facility] 12/14/18   • GERD (gastroesophageal reflux disease)     Record received from [facility] 12/14/18   • Hyperlipidemia     Record received from [facility] 12/14/18   • Hypertension     Record received from [facility] 12/14/18       Past Surgical History:  Past Surgical History:   Procedure Laterality Date   • STEREOTACTIC BIOPSY Right 6/28/2019    Procedure: BRAIN BIOPSY, PARTIAL OPEN;  Surgeon: Christopher Nguyen M.D.;  Location: [facility];  Service: Neurosurgery   • SI SCREW Right 6/5/2019    Procedure: INSERTION, SCREW, SACROILIAC JOINT- FOR REMOVAL;  Surgeon: Cody Porter M.D.;  Location: [facility];   Service: Orthopedics   • CHAS N/A 5/24/2019    Procedure: ECHOCARDIOGRAM, TRANSESOPHAGEAL;  Surgeon: Christopher Potter M.D.;  Location: SURGERY [facility];  Service: Cardiac   • SI SCREW Right 12/17/2018    Procedure: SI SCREW;  Surgeon: Cody Porter M.D.;  Location: SURGERY [facility];  Service: Orthopedics   • OTHER      Bilateral Masectomy        Current Outpatient Medications:  Current Facility-Administered Medications   Medication Dose Route Frequency Provider Last Rate Last Dose   • pyrazinamide tablet 250 mg  250 mg Oral DAILY Lito Burrows M.D.   250 mg at 08/09/19 0502   • oxyCODONE immediate-release (ROXICODONE) tablet 5-10 mg  5-10 mg Oral Q6HRS PRN Cristóbal Randolph M.D.   5 mg at 08/09/19 1142   • acetaminophen (TYLENOL) tablet 1,000 mg  1,000 mg Oral TID Cristóbal Randolph M.D.   1,000 mg at 08/09/19 1142   • magnesium chloride (MAG-64) delayed-release tablet 64 mg  64 mg Oral BID Cristóbal Randolph M.D.   64 mg at 08/09/19 0500   • hyoscyamine-maalox plus-lidocaine viscous (GI COCKTAIL) oral susp 15 mL  15 mL Oral Q6HRS PRN Cristóbal Randolph M.D.   15 mL at 08/03/19 0756   • riFAMPin (RIFADINE) capsule 600 mg  600 mg Oral DAILY Cristóbal Randolph M.D.   600 mg at 08/09/19 0500   • thrombin (THROMBINAR) 5000 UNIT vial   Topical Intra-Op Once PRN Primo Persaud M.D.   1,900 Units at 07/22/19 1704   • temazepam (RESTORIL) capsule 15 mg  15 mg Oral QHS Cristóbal Randolph M.D.   15 mg at 08/08/19 1940   • potassium chloride SA (Kdur) tablet 20 mEq  20 mEq Oral DAILY Cristóbal Randolph M.D.   20 mEq at 08/09/19 0500   • losartan (COZAAR) tablet 75 mg  75 mg Oral Q DAY Cristóbal Randolph M.D.   75 mg at 08/09/19 0501   • pramipexole (MIRAPEX) tablet 0.25 mg  0.25 mg Oral TID Cristóbal Randolph M.D.   0.25 mg at 08/09/19 0747   • senna-docusate (PERICOLACE or SENOKOT S) 8.6-50 MG per tablet 2 Tab  2 Tab Oral BID Cristóbal Randolph M.D.   Stopped at 08/09/19 0600   • gabapentin (NEURONTIN)  capsule 400 mg  400 mg Oral TID Cristóbal Randolph M.D.   400 mg at 08/09/19 1142   • ferrous sulfate tablet 325 mg  325 mg Oral QDAY with Breakfast Cristóbal Randolph M.D.   325 mg at 08/09/19 0746   • diphenhydrAMINE (BENADRYL ITCH) topical cream   Topical TID PRN Cristóbal Randolph M.D.       • heparin injection 5,000 Units  5,000 Units Subcutaneous Q12HRS Cristóbal Randolph M.D.   5,000 Units at 08/09/19 0500   • pyridoxine (VITAMIN B-6) tablet 100 mg  100 mg Oral DAILY Cristóbal Randolph M.D.   100 mg at 08/09/19 0501   • levETIRAcetam (KEPPRA) tablet 500 mg  500 mg Oral BID Cristóbal Randolph M.D.   500 mg at 08/09/19 0501   • ethambutol (MYAMBUTOL) tablet 800 mg  800 mg Oral DAILY Cristóbal Randolph M.D.   800 mg at 08/09/19 0500   • pyrazinamide tablet 1,000 mg  1,000 mg Oral DAILY Cristóbal Randolph M.D.   1,000 mg at 08/09/19 0500   • Pharmacy Consult Request ...Pain Management Review 1 Each  1 Each Other PHARMACY TO DOSE Cassandra Hough M.D.       • labetalol (NORMODYNE,TRANDATE) injection 10 mg  10 mg Intravenous Q HOUR PRN Cassandra Hough M.D.   10 mg at 07/15/19 0539   • lactobacillus rhamnosus (CULTURELLE) capsule 1 Cap  1 Cap Oral QDAY with Breakfast Cassandra Hough M.D.   1 Cap at 08/09/19 0747   • lidocaine (LIDODERM) 5 % 2 Patch  2 Patch Transdermal Q24HR Cassandra Hough M.D.   2 Patch at 08/09/19 1141   • cyclobenzaprine (FLEXERIL) tablet 10 mg  10 mg Oral TID PRN Cassandra Hough M.D.   10 mg at 08/09/19 0536   • Respiratory Care per Protocol   Nebulization Continuous RT Adryan Handy, A.P.R.N.       • amLODIPine (NORVASC) tablet 10 mg  10 mg Oral Q DAY Cassandra Hough M.D.   10 mg at 08/09/19 0501   • cinacalcet (SENSIPAR) tablet 60 mg  60 mg Oral DAILY Cassandra Hough M.D.   60 mg at 08/09/19 0500   • atorvastatin (LIPITOR) tablet 10 mg  10 mg Oral Q EVENING Cassandra Hough M.D.   10 mg at 08/08/19 1738   • omeprazole (PRILOSEC) capsule 20 mg  20 mg Oral  DAILY Cassandra Hough M.D.   20 mg at 08/09/19 0501   • metoprolol (LOPRESSOR) tablet 25 mg  25 mg Oral TWICE DAILY Cassandra Hough M.D.   25 mg at 08/09/19 0501   • ondansetron (ZOFRAN) syringe/vial injection 4 mg  4 mg Intravenous Q4HRS PRN Cassandra Hough M.D.   4 mg at 07/17/19 1736   • ondansetron (ZOFRAN ODT) dispertab 4 mg  4 mg Oral Q4HRS PRN Cassandra Hough M.D.   4 mg at 07/18/19 0440       Allergies:  No Known Allergies    Family History:  History reviewed. No pertinent family history.    Social History:  Social History     Socioeconomic History   • Marital status:      Spouse name: Not on file   • Number of children: Not on file   • Years of education: Not on file   • Highest education level: Not on file   Occupational History   • Not on file   Social Needs   • Financial resource strain: Not on file   • Food insecurity:     Worry: Not on file     Inability: Not on file   • Transportation needs:     Medical: Not on file     Non-medical: Not on file   Tobacco Use   • Smoking status: Never Smoker   • Smokeless tobacco: Never Used   Substance and Sexual Activity   • Alcohol use: Yes     Comment: socially    • Drug use: No   • Sexual activity: Not on file   Lifestyle   • Physical activity:     Days per week: Not on file     Minutes per session: Not on file   • Stress: Not on file   Relationships   • Social connections:     Talks on phone: Not on file     Gets together: Not on file     Attends [employer] service: Not on file     Active member of club or organization: Not on file     Attends meetings of clubs or organizations: Not on file     Relationship status: Not on file   • Intimate partner violence:     Fear of current or ex partner: Not on file     Emotionally abused: Not on file     Physically abused: Not on file     Forced sexual activity: Not on file   Other Topics Concern   • Primary/coprimary nurse & associates Not Asked   • Family contact information Not Asked   • OK  "to release patient information to the following Yes     Comment: Arsh - Brother    • Patient preferred routine/Privacy concerns Not Asked   • Patient likes and dislikes Not Asked   • Participating In Research Study Not Asked   • Miscellaneous Not Asked   Social History Narrative   • Not on file          Physical Exam:  Physical Exam    Oriented x 3  Weight/BMI: Body mass index is 21.37 kg/m².  /61   Pulse 83   Temp 36.9 °C (98.4 °F) (Temporal)   Resp 18   Ht 1.575 m (5' 2\")   Wt 53 kg (116 lb 13.5 oz)   SpO2 95%     Base Eye Exam     Visual Acuity       Right Left    Near sc J1 J1-          Pupils       Pupils    Right PERRL    Left PERRL          Visual Fields       Right Left     Full Full          Extraocular Movement       Right Left     Full, Ortho Full, Ortho          Neuro/Psych     Oriented x3:  Yes    Mood/Affect:  Normal          Dilation     Both eyes:  Tropicamide (MYDRIACYL) 1% ophthalmic solution, Phenylephrine (NEOSYNEPHRINE) ophthalmic solution 2.5% @ 1:20 PM            Additional Tests     Color       Right Left    Ishihara 10/10 9/10            Slit Lamp and Fundus Exam     External Exam       Right Left    External Normal Normal          Slit Lamp Exam       Right Left    Lids/Lashes Normal Normal    Conjunctiva/Sclera White and quiet White and quiet    Cornea Clear Clear    Anterior Chamber Deep and quiet Deep and quiet    Iris Round and reactive Irregular pupil    Lens Clear Clear    Vitreous Normal Normal          Fundus Exam       Right Left    Disc Normal Normal    C/D Ratio 0.2 0.2    Macula Normal Normal    Vessels Normal Normal    Periphery Normal Normal                Pertinent Lab/Test/Imaging Review:      Assessment and Plan:     Abscess of right iliac muscle and right gluteus medius muscle  CT guided pelvic aspiration 5/30/2019  Hardware removal, pelvis 6/5/2019  CT guided deep bone biopsy 6/19/2019  CT guided aspiration/biopsy of a R gluteal fluid collection/phlegmon " 7/11/2019 - consistent with TB  Continue Isoniazid, Rifampin, Ethambutol, Pyrazinamide  Associated pain improving  Levaquin to continue for 9-12 months    Continue antibiotic, guidance of antibiotic therapy per infectious disease team      COPD (chronic obstructive pulmonary disease) (HCC)  No exacerbation  RT protocol    Hypomagnesemia  Oral magnesium    Hypercalcemia  Will recheck    History of breast cancer  history left mastectomy and breast implant.      Essential hypertension  Continue Metoprolol, Losartan and Amlodipine as tolerated  Blood pressure stable    Chronic pain  Consider opioid sparing therapy    RLS (restless legs syndrome)  Pramipexole    History of DVT (deep vein thrombosis)  Xarelto was held due to hip hematoma and rifampin  Consider starting coumadin in 1  weeks if h/h remains stable    Sepsis (HCC)  This is sepsis (without associated acute organ dysfunction).    Source - Right Iliac and Gluteus medius abscess  Resolved    Non-severe protein-calorie malnutrition (HCC)  Continue to optimize nutrition      Brain lesion  Right-sided craniotomy for resection of superficial mass  6/28/2019  Continue Isoniazid, Rifampin, Ethambutol, Pyrazinamide per ID, discussed with ID they have reached out to [facility] to discuss outpatient regimen  Continue Keppra for seizure prophylaxis, per ID consider restarting steroids if she has breakthrough seizures  No seizures observed    Acquired circulating anticoagulants (HCC)  Anticoagulation on hold given recent brain biopsy.  Discussed with Dr. Mela Kaufman to start AC per surgery .  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  Post biopsy  Amphotericin b  pending fungal work up for differentiation  cefazolin    Hypercalcemia  Reassess chemistry  On sensivar  Daily calcium level  May need to adjust dosing  Given dose calcitonin overnight  Improved to normal levels  On normal  saline    Brain lesion  S/p biopsy  Fungal 6/28  Amphotericin b  q 1 hour neuro checks, may be relaxed today as continues to be stable    Abscess of right iliac muscle and right gluteus medius muscle  Likely fungal infection  Multiple courses antibiotics  Amphotericin b  Pending differentiation of type  On ancef    Essential hypertension  Keep sbp < 160  Continue amlodipine, losartan and metoprolol    COPD (chronic obstructive pulmonary disease) ([facility])  Prn albuterol    Chronic hyponatremia  Daily chem, mild  On normal saline    Granulomatous disease   Continue management per infectious disease team    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 Vision and color vision excellent. Optic nerves pink without areas of atrophy. In addition no chorioretinal lesions noted. Would like to evaluate in 3 months as an outpatient [address] Neuro-ophthalmology. At that time can also obtain OCT evaluating the nerve fiber and ganglion cell thickness.         Sheng Antonio M.D.

## 2019-08-09 NOTE — DISCHARGE PLANNING
Anticipated Discharge Disposition: [facility]    Action: Pt discussed during rounds and Dr. Devi has asked that we reschedule transport to possibly tomorrow. Pt needs an ophthalmologist consult before d/c. LSW has called pt's brother, René ([phone]) to inform of change.     Barriers to Discharge: None    Plan: LSW to assist as needed.

## 2019-08-10 VITALS
TEMPERATURE: 98.1 F | HEIGHT: 62 IN | RESPIRATION RATE: 16 BRPM | BODY MASS INDEX: 21.5 KG/M2 | WEIGHT: 116.84 LBS | HEART RATE: 73 BPM | SYSTOLIC BLOOD PRESSURE: 142 MMHG | DIASTOLIC BLOOD PRESSURE: 64 MMHG | OXYGEN SATURATION: 97 %

## 2019-08-10 PROCEDURE — 700102 HCHG RX REV CODE 250 W/ 637 OVERRIDE(OP): Performed by: INTERNAL MEDICINE

## 2019-08-10 PROCEDURE — A9270 NON-COVERED ITEM OR SERVICE: HCPCS | Performed by: FAMILY MEDICINE

## 2019-08-10 PROCEDURE — 700102 HCHG RX REV CODE 250 W/ 637 OVERRIDE(OP): Performed by: FAMILY MEDICINE

## 2019-08-10 PROCEDURE — A9270 NON-COVERED ITEM OR SERVICE: HCPCS | Performed by: INTERNAL MEDICINE

## 2019-08-10 PROCEDURE — 99239 HOSP IP/OBS DSCHRG MGMT >30: CPT | Performed by: INTERNAL MEDICINE

## 2019-08-10 PROCEDURE — 99232 SBSQ HOSP IP/OBS MODERATE 35: CPT | Performed by: INTERNAL MEDICINE

## 2019-08-10 PROCEDURE — 700111 HCHG RX REV CODE 636 W/ 250 OVERRIDE (IP): Performed by: INTERNAL MEDICINE

## 2019-08-10 PROCEDURE — 700101 HCHG RX REV CODE 250: Performed by: FAMILY MEDICINE

## 2019-08-10 RX ORDER — OXYCODONE HYDROCHLORIDE 5 MG/1
5-10 TABLET ORAL EVERY 6 HOURS PRN
Qty: 10 TAB | Refills: 0 | Status: SHIPPED | OUTPATIENT
Start: 2019-08-10 | End: 2019-08-13

## 2019-08-10 RX ORDER — TEMAZEPAM 15 MG/1
15 CAPSULE ORAL
Qty: 3 CAP | Refills: 0 | Status: SHIPPED | OUTPATIENT
Start: 2019-08-10 | End: 2019-08-13

## 2019-08-10 RX ORDER — AMLODIPINE BESYLATE 10 MG/1
10 TABLET ORAL DAILY
Qty: 30 TAB
Start: 2019-08-11

## 2019-08-10 RX ORDER — CYCLOBENZAPRINE HCL 10 MG
10 TABLET ORAL 3 TIMES DAILY PRN
Qty: 30 TAB | Refills: 0
Start: 2019-08-10

## 2019-08-10 RX ORDER — HEPARIN SODIUM 5000 [USP'U]/ML
5000 INJECTION, SOLUTION INTRAVENOUS; SUBCUTANEOUS EVERY 8 HOURS
Start: 2019-08-10

## 2019-08-10 RX ORDER — POTASSIUM CHLORIDE 20 MEQ/1
20 TABLET, EXTENDED RELEASE ORAL DAILY
Qty: 60 TAB | Refills: 11
Start: 2019-08-11

## 2019-08-10 RX ORDER — PYRAZINAMIDE TABLET 500 MG/1
250 TABLET ORAL DAILY
Qty: 30 TAB | Refills: 3
Start: 2019-08-11

## 2019-08-10 RX ORDER — AMOXICILLIN 250 MG
2 CAPSULE ORAL 2 TIMES DAILY
Qty: 30 TAB | Refills: 0
Start: 2019-08-10

## 2019-08-10 RX ORDER — LIDOCAINE 50 MG/G
2 PATCH TOPICAL EVERY 24 HOURS
Qty: 10 PATCH
Start: 2019-08-10

## 2019-08-10 RX ORDER — LOSARTAN POTASSIUM 25 MG/1
75 TABLET ORAL DAILY
Qty: 30 TAB
Start: 2019-08-11

## 2019-08-10 RX ORDER — ETHAMBUTOL HYDROCHLORIDE 400 MG/1
800 TABLET, FILM COATED ORAL DAILY
Qty: 30 TAB
Start: 2019-08-11

## 2019-08-10 RX ORDER — RIFAMPIN 300 MG/1
600 CAPSULE ORAL DAILY
Qty: 60 CAP | Refills: 11
Start: 2019-08-11

## 2019-08-10 RX ORDER — GABAPENTIN 400 MG/1
400 CAPSULE ORAL 3 TIMES DAILY
Qty: 90 CAP
Start: 2019-08-10

## 2019-08-10 RX ORDER — LEVETIRACETAM 500 MG/1
500 TABLET ORAL 2 TIMES DAILY
Qty: 60 TAB
Start: 2019-08-10

## 2019-08-10 RX ORDER — CINACALCET 60 MG/1
60 TABLET, FILM COATED ORAL DAILY
Qty: 30 TAB
Start: 2019-08-11

## 2019-08-10 RX ORDER — PYRAZINAMIDE TABLET 500 MG/1
1000 TABLET ORAL DAILY
Qty: 30 TAB | Refills: 3
Start: 2019-08-11

## 2019-08-10 RX ORDER — OMEPRAZOLE 20 MG/1
20 CAPSULE, DELAYED RELEASE ORAL DAILY
Qty: 30 CAP
Start: 2019-08-11

## 2019-08-10 RX ADMIN — CINACALCET HYDROCHLORIDE 60 MG: 30 TABLET, COATED ORAL at 05:16

## 2019-08-10 RX ADMIN — METOPROLOL TARTRATE 25 MG: 25 TABLET, FILM COATED ORAL at 05:17

## 2019-08-10 RX ADMIN — HEPARIN SODIUM 5000 UNITS: 5000 INJECTION, SOLUTION INTRAVENOUS; SUBCUTANEOUS at 05:18

## 2019-08-10 RX ADMIN — PYRAZINAMIDE 250 MG: 500 TABLET ORAL at 05:18

## 2019-08-10 RX ADMIN — ETHAMBUTOL HYDROCHLORIDE 800 MG: 400 TABLET, FILM COATED ORAL at 05:16

## 2019-08-10 RX ADMIN — ACETAMINOPHEN 1000 MG: 500 TABLET ORAL at 05:16

## 2019-08-10 RX ADMIN — AMLODIPINE BESYLATE 10 MG: 5 TABLET ORAL at 05:17

## 2019-08-10 RX ADMIN — LEVETIRACETAM 500 MG: 500 TABLET, FILM COATED ORAL at 05:17

## 2019-08-10 RX ADMIN — OMEPRAZOLE 20 MG: 20 CAPSULE, DELAYED RELEASE ORAL at 05:17

## 2019-08-10 RX ADMIN — POTASSIUM CHLORIDE 20 MEQ: 20 TABLET, EXTENDED RELEASE ORAL at 05:17

## 2019-08-10 RX ADMIN — Medication 1 CAPSULE: at 08:12

## 2019-08-10 RX ADMIN — ACETAMINOPHEN 1000 MG: 500 TABLET ORAL at 12:00

## 2019-08-10 RX ADMIN — RIFAMPIN 600 MG: 300 CAPSULE ORAL at 05:16

## 2019-08-10 RX ADMIN — PYRAZINAMIDE 1000 MG: 500 TABLET ORAL at 05:16

## 2019-08-10 RX ADMIN — GABAPENTIN 400 MG: 400 CAPSULE ORAL at 12:00

## 2019-08-10 RX ADMIN — MAGNESIUM 64 MG (MAGNESIUM CHLORIDE) TABLET,DELAYED RELEASE 64 MG: at 05:17

## 2019-08-10 RX ADMIN — FERROUS SULFATE TAB 325 MG (65 MG ELEMENTAL FE) 325 MG: 325 (65 FE) TAB at 08:12

## 2019-08-10 RX ADMIN — LIDOCAINE 2 PATCH: 50 PATCH CUTANEOUS at 12:00

## 2019-08-10 RX ADMIN — CYCLOBENZAPRINE 10 MG: 10 TABLET, FILM COATED ORAL at 08:13

## 2019-08-10 RX ADMIN — LOSARTAN POTASSIUM 75 MG: 50 TABLET ORAL at 05:17

## 2019-08-10 RX ADMIN — OXYCODONE HYDROCHLORIDE 10 MG: 5 TABLET ORAL at 12:00

## 2019-08-10 RX ADMIN — PYRIDOXINE HCL TAB 50 MG 100 MG: 50 TAB at 05:17

## 2019-08-10 RX ADMIN — OXYCODONE HYDROCHLORIDE 5 MG: 5 TABLET ORAL at 03:07

## 2019-08-10 RX ADMIN — GABAPENTIN 400 MG: 400 CAPSULE ORAL at 05:17

## 2019-08-10 RX ADMIN — PRAMIPEXOLE DIHYDROCHLORIDE 0.25 MG: 0.5 TABLET ORAL at 08:12

## 2019-08-10 RX ADMIN — SENNOSIDES, DOCUSATE SODIUM 2 TABLET: 50; 8.6 TABLET, FILM COATED ORAL at 05:17

## 2019-08-10 ASSESSMENT — ENCOUNTER SYMPTOMS
BLURRED VISION: 0
ABDOMINAL PAIN: 0
DIZZINESS: 0
SHORTNESS OF BREATH: 0
CHILLS: 0
NAUSEA: 0
DOUBLE VISION: 0
COUGH: 0
DIARRHEA: 0
FEVER: 0
VOMITING: 0

## 2019-08-10 ASSESSMENT — PATIENT HEALTH QUESTIONNAIRE - PHQ9
2. FEELING DOWN, DEPRESSED, IRRITABLE, OR HOPELESS: NOT AT ALL
SUM OF ALL RESPONSES TO PHQ9 QUESTIONS 1 AND 2: 0
1. LITTLE INTEREST OR PLEASURE IN DOING THINGS: NOT AT ALL

## 2019-08-10 NOTE — DISCHARGE PLANNING
Agency/Facility Name: [facility]  Spoke To: Vanda  Outcome: Bed available for patient today. Requesting a 1600 transport. D/C summary sent via fax.    Received Transport Form @ 2114  Spoke to Kadi @ [facility]    Transport is scheduled for 8/10 @1600 going to [facility].     PATRICK Cavanaugh notified via skype.

## 2019-08-10 NOTE — PROGRESS NOTES
Hospital Medicine Daily Progress Note    Date of Service  8/9/2019    Chief Complaint  68 y.o. female admitted 5/29/2019 with right iliac and gluteus abscess    Hospital Course  Patient is a 68 year old with history of breast cancer, copd, DM, gerd, dl and htn who was admitted with right iliac gluteus abscess.  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    Interval Problem Update  Patient seen and evaluated on rounds  Discussed with nursing staff on rounds  No complaints  Awaiting postacute placement to SNF   Discussed with ID   Optholamology consult tday requested    Consultants/Specialty  Infectious disease  Neurosurgery  Orthopedics    Code Status  DNR/DNI    Disposition  Awaiting postacute placement to skilled nursing facility in [place], discussed with /case management on rounds    I provided my personal cell phone number for the skilled nursing facility physician to get in touch with me if they would like to discuss physician to physician sign out    Review of Systems  Review of Systems   Constitutional: Negative for chills, fever and weight loss.   HENT: Negative for ear pain, hearing loss and tinnitus.    Eyes: Negative for blurred vision, double vision and photophobia.   Respiratory: Negative for cough, hemoptysis and sputum production.    Cardiovascular: Negative for chest pain, palpitations and orthopnea.   Gastrointestinal: Negative for heartburn, nausea and vomiting.   Genitourinary: Negative for dysuria, flank pain, frequency and hematuria.   Musculoskeletal: Positive for joint pain. Negative for back pain and neck pain.   Skin: Negative for itching and rash.   Neurological: Negative for tremors, speech change, focal weakness and headaches.   Endo/Heme/Allergies: Negative for environmental allergies and polydipsia. Does not bruise/bleed easily.    Psychiatric/Behavioral: Negative for hallucinations and substance abuse. The patient is not nervous/anxious.         Physical Exam  Temp:  [36.5 °C (97.7 °F)-36.9 °C (98.4 °F)] 36.5 °C (97.7 °F)  Pulse:  [71-83] 82  Resp:  [17-20] 20  BP: (105-144)/(51-70) 124/70  SpO2:  [94 %-99 %] 94 %    Physical Exam   Constitutional: She is oriented to person, place, and time. She appears well-developed and well-nourished.   HENT:   Head: Normocephalic and atraumatic.   Eyes: Pupils are equal, round, and reactive to light. EOM are normal.   Neck: Normal range of motion. Neck supple.   Cardiovascular: Normal rate, regular rhythm and normal heart sounds.   Pulmonary/Chest: Effort normal and breath sounds normal.   Abdominal: Soft. Bowel sounds are normal.   Musculoskeletal: Normal range of motion.   Tenderness to palpation  Of the left buttock   Neurological: She is alert and oriented to person, place, and time.   Skin: Skin is warm and dry.   Psychiatric: She has a normal mood and affect. Cognition and memory are impaired.   Nursing note and vitals reviewed.    Unchanged from yesterday     Fluids    Intake/Output Summary (Last 24 hours) at 8/9/2019 1800  Last data filed at 8/9/2019 0600  Gross per 24 hour   Intake 120 ml   Output 700 ml   Net -580 ml       Laboratory  Recent Labs     08/07/19  0259   WBC 3.8*   RBC 3.47*   HEMOGLOBIN 10.5*   HEMATOCRIT 32.9*   MCV 94.8   MCH 30.3   MCHC 31.9*   RDW 51.7*   PLATELETCT 175   MPV 9.5     Recent Labs     08/07/19  0259   SODIUM 138   POTASSIUM 4.0   CHLORIDE 106   CO2 25   GLUCOSE 96   BUN 28*   CREATININE 0.80   CALCIUM 9.1                   Imaging  DX-HIP-UNILATERAL-WITH PELVIS-1 VIEW LEFT   Final Result      No LEFT hip or pelvic fracture.      IR-INJ-EXT PSEUDOANEURYSM PERC   Final Result      1.  Ultrasound guided thrombin injection for treatment of a RIGHT gluteal 3.6 cm pseudoaneurysm.      US-EXTREMITY ARTERY LOWER UNILAT RIGHT   Final Result      Right gluteal  pseudoaneurysm measuring 3.65 x 2.24 x 2.31 cm.      CTA ABDOMEN PELVIS W & W/O POST PROCESS   Final Result      1.  Interval enlargement of a hyperdense ovoid masslike structure immediately posterior to the right ilium, possibly representing a pseudoaneurysm.   2.  Stable thin-walled apparent fluid-filled structure immediately deep to the right ilium.   3.   Minimal retroperitoneal adenopathy, grossly stable.   4.  Cholelithiasis.      MR-BRAIN-WITH & W/O   Final Result      1.  Innumerable nodular enhancing lesions throughout the brain parenchyma both the supra and infratentorial compartments predominantly near the gray-white junction. There has been interval increase in size of several of these lesions since previous exam.    These changes may be secondary to metastatic disease or granulomatous disease.      2.  Interval right parietal craniotomy with underlying elliptical postsurgical defect.      3.  Interval increase in size of index right thalamic lesion which has increased vasogenic edema since previous exam.      4.  Age-related cerebral atrophy.      IR-PICC LINE PLACEMENT W/ GUIDANCE > AGE 5   Final Result                  Ultrasound-guided PICC placement performed by qualified nursing staff as    above.          [id number] VIEW   Final Result         1.  Moderate stool in the colon suggests changes of constipation, otherwise nonspecific bowel gas pattern   2.  Atherosclerosis      CT-NEEDLE BX-ABD-RETROPERITONL   Final Result      1.  CT GUIDED biopsy of a right pelvic phlegmon.      2. Significant interval enlargement of a right gluteal pseudoaneurysm. CTA and consideration for possible intervention either with direct thrombin injection or endovascular treatment was suggested. This was conveyed to Dr. Alatorre on 7/11/2019 via [employer]    text at 1750 hours.      US-EXTREMITY VENOUS LOWER BILAT   Final Result      IR-US GUIDED PIV   Final Result    Ultrasound-guided PERIPHERAL IV INSERTION performed by     qualified nursing staff as above.            MR-LUMBAR SPINE-WITH & W/O   Final Result         1.  Grade 2-3 spondylolisthesis at the L5-S1 level with bilateral spondylolysis of the pars interarticularis. This causes severe bilateral neural foraminal stenosis at this level.      2.  Interval removal of posterior fusion hardware at the lumbosacral junction.      3.  Diffuse osteomyelitis involving the L5 vertebral body and the first 3 sacral segments.      4.  Chronic discitis at the L5-S1 level with anterior paraspinous abscess at this level and anterior to the S1 sacral segment.      5.  Smaller intraosseous bone bone abscesses or areas of necrosis noted in the sacrum.      6.  There is also evidence of osteomyelitis involving the jose antonio and sacrum bilaterally along the course of the previous sacral fixation screw. There is a large 4 cm abscess noted in the right gluteal soft tissues in a smaller 3 cm chronic appearing    abscess in the left gluteal musculature.      7.  There is a 1.5 cm intraosseous abscess or area of necrosis in the right posterior ilium.      8.  There is a 3.3 cm centimeters multiloculated abscess anterior to the right sacroiliac joint.      MR-STEALTH BRAIN WITH & W/O   Final Result         1.  Innumerable subcentimeter brain metastases, many at the gray-white junction but also multiple noted throughout the basal ganglia and brainstem.      2.  Largest index lesion is noted in the right thalamus and has increased in size since previous exam and currently measures 9 mm and has a moderate amount of surrounding vasogenic edema.      US-EXTREMITY NON VASCULAR UNILATERAL RIGHT   Final Result      No right hip joint effusion. No soft tissue fluid collection.      MR-BRAIN-WITH & W/O   Final Result      1.  Innumerable supra and infratentorial enhancing nodules are again noted throughout the brain parenchyma with multiple lesions appearing somewhat larger and with increased enhancement. No  associated diffusion restriction. Unchanged differential    considerations including bacterial or fungal infection versus metastatic disease.   2.  Mild diffuse cerebral substance loss.   3.  Mild microangiopathic ischemic change versus demyelination or gliosis.      CT-NEEDLE BX-DEEP BONE   Final Result      CT GUIDED RIGHT ILIUM DEEP BONE BIOPSY. SAMPLES WERE SENT TO MICROBIOLOGY FOR ANALYSIS.      CT-PELVIS WITH   Final Result         1.  No significant interval change in the size of the RIGHT iliac muscle fluid collections   2.  Hyperdense RIGHT gluteal lesion moderately suspicious for pseudoaneurysm with adjacent hematoma.   3.  Changes in the RIGHT iliac bone, BILATERAL sacrum and LEFT iliac bone suspicious for osteomyelitis   4.  Changes at L5-S1 suspicious for discitis osteomyelitis      CT-PELVIS WITH   Final Result      1.  Residual or recurrent abscess within the right iliacus muscle measuring 2.5 x 1.8 cm. It slightly smaller than on 5/28/2019      2.  Interval removal of hardware from the iliac bones and sacrum      3.  Lytic change of the right iliac bone and the sacrum. This could be related to hardware versus osteomyelitis.      4.  Subacute fractures of the right ilium, sacrum, and there is lucency within the left iliac bone that is likely from hardware      MR-BRAIN-WITH & W/O   Final Result      1.  Interval progression of supra and infratentorial intra-axial nodules and associated edema. This short-term interval progression favors infection over neoplasm though both remain considerations.   2.  Mild atrophy      DX-PORTABLE FLUOROSCOPY < 1 HOUR   Final Result         Portable fluoroscopy utilized for 2 minutes.      DX-PELVIS-1 OR 2 VIEWS   Final Result      Status post hardware removal from the right SI joint      DX-CHEST-PORTABLE (1 VIEW)   Final Result      Interstitial prominence could be due to pulmonary edema or hypoventilatory change.      DX-CHEST-LIMITED (1 VIEW)   Final Result       LEFT basilar underinflation atelectasis or pneumonia      CT-DRAIN-HEMATOMA - SEROMA   Final Result      CT-guided RIGHT pelvic fluid collection aspiration, laboratory evaluation pending              Assessment/Plan  * Brain lesion- (present on admission)  Assessment & Plan  Right-sided craniotomy for resection of superficial mass  6/28/2019  Continue Isoniazid, Rifampin, Ethambutol, Pyrazinamide per ID, discussed with ID they have reached out to [facility] to discuss outpatient regimen  Continue Keppra for seizure prophylaxis, per ID consider restarting steroids if she has breakthrough seizures  No seizures observed    Granulomatous disease - (present on admission)  Assessment & Plan  Continue management per infectious disease team    Abscess of right iliac muscle and right gluteus medius muscle- (present on admission)  Assessment & Plan  CT guided pelvic aspiration 5/30/2019  Hardware removal, pelvis 6/5/2019  CT guided deep bone biopsy 6/19/2019  CT guided aspiration/biopsy of a R gluteal fluid collection/phlegmon 7/11/2019 - consistent with TB  Continue Isoniazid, Rifampin, Ethambutol, Pyrazinamide  Associated pain improving  Levaquin to continue for 9-12 months    Continue antibiotic, guidance of antibiotic therapy per infectious disease team      Osteomyelitis (HCC)- (present on admission)  Assessment & Plan  Disseminated tuberculosis, infectious disease following and managing    Pseudoaneurysm following procedure (HCC)- (present on admission)  Assessment & Plan  s/p IR thrombin injection for thrombosed psedudoanerysm    Hypomagnesemia- (present on admission)  Assessment & Plan  Oral magnesium    Dysphagia- (present on admission)  Assessment & Plan  Diet per SLP recommendations    Essential hypertension- (present on admission)  Assessment & Plan  Continue Metoprolol, Losartan and Amlodipine as tolerated  Blood pressure stable    COPD (chronic obstructive pulmonary disease) (HCC)- (present on  admission)  Assessment & Plan  No exacerbation  RT protocol    Non-severe protein-calorie malnutrition (HCC)- (present on admission)  Assessment & Plan  Continue to optimize nutrition      History of DVT (deep vein thrombosis)- (present on admission)  Assessment & Plan  Xarelto was held due to hip hematoma and rifampin  Consider starting coumadin in 1  weeks if h/h remains stable    RLS (restless legs syndrome)- (present on admission)  Assessment & Plan  Pramipexole    Chronic pain- (present on admission)  Assessment & Plan  Consider opioid sparing therapy    History of breast cancer- (present on admission)  Assessment & Plan  history left mastectomy and breast implant.       opthlamology consult    VTE prophylaxis: Heparin

## 2019-08-10 NOTE — DISCHARGE INSTRUCTIONS
"From follow-up perspective, patient will need to maintain close outpatient follow-up with infectious disease team, [facility] (last seen by Dr Betancourt in the hospital).  In addition patient will need to maintain follow-up with neurosurgery, Dr. Nguyen.  Also to maintain follow-up with Dr. Antonio from ophthalmology in 3 months of hospital discharge \"[address] Neuro-ophthalmology. Patient will need an interval MRI of the brain approximately around August 15, 2019 per ID recommendations.From a management perspective, infectious disease team has recommended the use of rifampin, pyrazinamide and ethambutol for the next 9 to 12 months.Following discharge, patient will need weekly monitoring of CBC, CMP at the skilled nursing facility.  Anemia of chronic disease noted, hemoglobin of 10.5 last checked on August 7, 2019, ongoing leukopenia with last WBC of 3.8 on August 7, 2019. Of note, patient with history of deep venous thrombosis, anticoagulation was stopped because of hip hematoma and Xarelto's interaction with rifampin.  Patient remains on DVT preventive prophylaxis with subcutaneous heparin, 5000 units 3 times a day at the time of discharge.  Recommend physician at the [facility] nursing facility follow-up hemoglobin around August 15, 2019.    Discharge Instructions    Discharged to other by medical transportation with self. Discharged via ambulance, hospital escort: Yes.  Special equipment needed: Not Applicable    Be sure to schedule a follow-up appointment with your primary care doctor or any specialists as instructed.     Discharge Plan:   Diet Plan: Discussed  Activity Level: Discussed  Confirmed Follow up Appointment: Patient to Call and Schedule Appointment  Confirmed Symptoms Management: Discussed  Medication Reconciliation Updated: Yes  Pneumococcal Vaccine Administered/Refused: Not given - Patient refused pneumococcal vaccine  Influenza Vaccine Indication: Not indicated: " Previously immunized this influenza season and > 8 years of age    I understand that a diet low in cholesterol, fat, and sodium is recommended for good health. Unless I have been given specific instructions below for another diet, I accept this instruction as my diet prescription.   Other diet: Regular    Special Instructions: None    · Is patient discharged on Warfarin / Coumadin?   No     Depression / Suicide Risk    As you are discharged from this [facility], it is important to learn how to keep safe from harming yourself.    Recognize the warning signs:  · Abrupt changes in personality, positive or negative- including increase in energy   · Giving away possessions  · Change in eating patterns- significant weight changes-  positive or negative  · Change in sleeping patterns- unable to sleep or sleeping all the time   · Unwillingness or inability to communicate  · Depression  · Unusual sadness, discouragement and loneliness  · Talk of wanting to die  · Neglect of personal appearance   · Rebelliousness- reckless behavior  · Withdrawal from people/activities they love  · Confusion- inability to concentrate     If you or a loved one observes any of these behaviors or has concerns about self-harm, here's what you can do:  · Talk about it- your feelings and reasons for harming yourself  · Remove any means that you might use to hurt yourself (examples: pills, rope, extension cords, firearm)  · Get professional help from the community (Mental Health, Substance Abuse, psychological counseling)  · Do not be alone:Call your Safe Contact- someone whom you trust who will be there for you.  · Call your local CRISIS HOTLINE 192-7308 or 141-082-7822  · Call your local Children's Mobile Crisis Response Team [place] (211) 790-6353 or www.Respi  · Call the toll free National Suicide Prevention Hotlines   · National Suicide Prevention Lifeline 385-612-RJGY (3653)  · National Hope Line Network 800-SHHGCBM  (175-4567)

## 2019-08-10 NOTE — PROGRESS NOTES
Infectious Disease Progress Note    Author: Ashely Hinojosa M.D. Date & Time of service: 8/10/2019  11:47 AM    Chief Complaint:  Brain abscess, gluteal abscess  Vertebral OM     Interval History:  68 y.o. female admitted 5/29/2019 as a transfer from [facility] since 4/30/2019. + diabetes, SANTOSH of right hip with hardware, and breast cancer who was originally admitted to [facility] on 03/08/2019 for right hip and pelvic pain.  Work-up revealed a right iliopsoas lesion, gluteal abscess, and mult brain abscesses     7/23/2019-no fevers.  Ongoing pain.  The AFB cultures have come back positive for TB  7/24/2019 no fevers.  No new issues overnight.  Very anxious and agitated  7/25/2019 no fevers.  No new issues.  Says she feels slightly better.  7/26 AF WBC 4.5 tolerating meds well-wants cream to decrease size of surgical scar-has right-sided rib pain and joint pain  7/27 AF ambulating with assist with walker-no new complaints  7/28 AF same pain complaint-remains debilitated with intermittent confusion  7/29 AF WBC 4.2 no clinical change  7/30 AF WBC 3.3 sleeping sounding-dw RN has been complaining of hip pain but able to ambulate  7/31 AF WBC 3 ambulating in peng with rolling walker-NAD. No new complaints  8/1 AF knitting in bed-looks comfortable but requesting more pain meds  8/2 AF No CBC no new complaints  8/3 AF tolerating PO-no rash Same pain  8/5 afebrile, no CBC, continues to report pain, no new medications, tolerating antibiotics.  8/6 afebrile, white count 3.1.  No new issues, tolerating antibiotics.  8/8 afebrile, white count 3.8 on 8/7.  No concerns on CMP.  Reports no issues with vision, solving sudoku this morning.  Spoke with health department.  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  8/9 afebrile, lying comfortably in bed.  Patient states she is going to a skilled nursing facility today however according to hospitalist this is not the case.  Still awaiting ophthalmology examination  8/10 afebrile no new  issues to report.  She was evaluated by ophthalmology yesterday and had a normal eye exam.  Plan for transferred to [facility] today    Review of Systems:  Review of Systems   Constitutional: Negative for chills and fever.   Eyes: Negative for blurred vision and double vision.   Respiratory: Negative for cough and shortness of breath.    Cardiovascular: Negative for chest pain.   Gastrointestinal: Negative for abdominal pain, diarrhea, nausea and vomiting.   Genitourinary: Negative for dysuria.   Musculoskeletal: Positive for joint pain.   Skin: Negative for itching and rash.   Neurological: Negative for dizziness.   All other systems reviewed and are negative.      Hemodynamics:  Temp (24hrs), Av.8 °C (98.2 °F), Min:36.5 °C (97.7 °F), Max:36.9 °C (98.5 °F)  Temperature: 36.7 °C (98.1 °F)  Pulse  Av  Min: 49  Max: 195   Blood Pressure : 142/64       Physical Exam:  Physical Exam   Constitutional: No distress.   Elderly  Thin and frail     HENT:   Mouth/Throat: Oropharynx is clear and moist. No oropharyngeal exudate.   Right parietal surgical site healed   Eyes: Pupils are equal, round, and reactive to light. Right eye exhibits no discharge. Left eye exhibits no discharge.   Neck: Neck supple. No JVD present.   Cardiovascular: Normal rate and regular rhythm.   Pulmonary/Chest: Effort normal. No stridor. She has no wheezes. She has no rales.   Abdominal: Soft. There is no rebound and no guarding.   Musculoskeletal: She exhibits no edema.   Neurological: She is alert. Coordination normal.   Skin: Skin is warm. No rash noted. She is not diaphoretic. No erythema.   Nursing note and vitals reviewed.      Meds:    Current Facility-Administered Medications:   •  pyrazinamide  •  oxyCODONE immediate-release  •  acetaminophen  •  magnesium chloride  •  hyoscyamine-maalox plus-lidocaine viscous  •  riFAMPin  •  thrombin  •  temazepam  •  potassium chloride SA  •  losartan  •  pramipexole  •  senna-docusate  •   gabapentin  •  ferrous sulfate  •  diphenhydrAMINE  •  heparin  •  pyridoxine  •  levETIRAcetam  •  ethambutol  •  pyrazinamide  •  Pharmacy Consult Request  •  labetalol  •  lactobacillus rhamnosus  •  lidocaine  •  cyclobenzaprine  •  Respiratory Care per Protocol  •  amLODIPine  •  cinacalcet  •  atorvastatin  •  omeprazole  •  metoprolol  •  ondansetron  •  ondansetron    Labs:  No results for input(s): WBC, RBC, HEMOGLOBIN, HEMATOCRIT, MCV, MCH, RDW, PLATELETCT, MPV, NEUTSPOLYS, LYMPHOCYTES, MONOCYTES, EOSINOPHILS, BASOPHILS, RBCMORPHOLO in the last 72 hours.  No results for input(s): SODIUM, POTASSIUM, CHLORIDE, CO2, GLUCOSE, BUN, CPKTOTAL in the last 72 hours.  No results for input(s): ALBUMIN, TBILIRUBIN, ALKPHOSPHAT, TOTPROTEIN, ALTSGPT, ASTSGOT, CREATININE in the last 72 hours.    Imaging:  MRI on 7/14/2019  Impression:       1.  Innumerable nodular enhancing lesions throughout the brain parenchyma both the supra and infratentorial compartments predominantly near the gray-white junction. There has been interval increase in size of several of these lesions since previous exam.   These changes may be secondary to metastatic disease or granulomatous disease.    2.  Interval right parietal craniotomy with underlying elliptical postsurgical defect.    3.  Interval increase in size of index right thalamic lesion which has increased vasogenic edema since previous exam.    4.  Age-related cerebral atrophy.       Micro:  Results     Procedure Component Value Units Date/Time    Fungal Culture [[id number]] Collected:  06/19/19 0930    Order Status:  Completed Specimen:  Tissue Updated:  08/07/19 1321     Significant Indicator NEG     Source TISS     Site Right Hip deep bone     Culture Result No fungal growth.    Narrative:       CALL  [facility]  TEREZA tel. [phone],  Collected By:[id number] BEATRICE HARRIS  Bone biopsy right hip  Collected By:[id number] BEATRICE HARRIS  Right hip fluid collection  Collected By:[id number] BEATRICE HARRIS     AFB Culture [[id number]]  (Abnormal) Collected:  06/19/19 0930    Order Status:  Completed Specimen:  Tissue from Other Body Fluid Updated:  08/07/19 1321     Significant Indicator POS     Source TISS     Site Right Hip deep bone     Culture Result Acid fast bacilli detected by MGIT 960 instrument.  Identification to follow.       AFB Smear Results No acid fast bacilli seen.     Culture Result Mycobacterium tuberculosis complex  By DNA probe  Positive for M. tb complex  Negative for M. avium complex  Negative for M. gordonae  Negative for M. kansasii  Isoniazid     0.1 ug/mL   Resistant  Isoniazid     0.4 ug/mL   Resistant  Rifampin      1.0 ug/mL   Sensitive  Ethambutol    5.0 ug/mL   Sensitive  Pyrazinamide  100 ug/mL   Sensitive  Testing performed at:  [facility]  [address] [place] [id number]  [phone]      Narrative:       CALL  [facility]  TEREZA tel. [phone],  Collected By:[id number] BEATRICE HARRIS  Bone biopsy right hip  Collected By:[id number] BEATRICE HARRIS  Right hip fluid collection  Collected By:[id number] BEATRICE HARRIS    Fungal Smear [[id number]] Collected:  06/19/19 0930    Order Status:  Completed Specimen:  Tissue from Other Body Fluid Updated:  08/07/19 1321     Significant Indicator NEG     Source TISS     Site Right Hip deep bone     Fungal Smear Results No fungal elements seen.    Narrative:       CALL  [facility]  TEREZA tel. [phone],  Collected By:[id number] BEATRICE HARRIS  Bone biopsy right hip  Collected By:[id number] BEATRICE HARRIS  Right hip fluid collection  Collected By:[id number] BEATRICE HARRIS    CULTURE TISSUE W/ GRM STAIN [[id number]] Collected:  06/19/19 0930    Order Status:  Completed Specimen:  Tissue Updated:  08/07/19 1321     Significant Indicator NEG     Source TISS     Site Right Hip deep bone     Culture Result No growth at 72 hours.     Gram Stain Result No organisms seen.    Narrative:       CALL  [facility]  TEREZA tel. [phone],  Collected By:[id number] BEATRICE HARRIS  Bone  "biopsy right hip  Collected By:[id number] BEATRICE HARRIS  Right hip fluid collection  Collected By:[id number] BEATRICE HARRIS    Fungal Culture [[id number]] Collected:  07/11/19 1745    Order Status:  Completed Specimen:  Tissue Updated:  08/07/19 0955     Significant Indicator NEG     Source TISS     Site Right Hip Cores     Culture Result No fungal growth.    Narrative:       CALL  [facility]  TEREZA tel. [phone],  CALLED  TEREZA tel. [phone] 08/02/2019, 11:03, RB PERF. RESULTS CALLED  TO:[id number].    AFB Culture [[id number]]  (Abnormal) Collected:  07/11/19 1745    Order Status:  Completed Specimen:  Tissue Updated:  08/07/19 0955     Significant Indicator POS     Source TISS     Site Right Hip Cores     Culture Result Acid fast bacilli detected by MGIT 960 instrument.  Identification to follow.       AFB Smear Results No acid fast bacilli seen.    Narrative:       CALL  [facility]  TEREZA tel. [phone],  CALLED  TEREZA tel. [phone] 08/02/2019, 11:03, RB PERF. RESULTS CALLED  TO:[id number].          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 Decrease in the size of the lesions. Interval reduction in the extent of the surrounding white matter edema consistent with improvement/treatment response of the most of the multifocal brain abscesses.  MRI on 6/8 with interval progression of supra and infratentorial intra--axial nodules and associated edema.  New right thalamus lesion. Radiology favors infection over neoplasm (had been off abx)  Mult blood cultures neg; CHAS neg 3/15 and 5/24  MRI 6/22 worsening CNS lesions  S/p right craniotomy and resection of mass with biopsy on 6/28. Biopsy-per note fungal " "appearing elements seen per Neurosurgery (patient was just briefly on IV amphotericin B, but this turned out to be an incorrect read)-path   +necrotizing granulomas. All stains negative in EPIC  Fungal culture- negative to date  Bacterial cultures-negative to date  Brucella ab - negative  Coxiella ab - negative   Histo ab serum & antigen urine-negative  Blasto ab - negative   Repeat MRI on 7/14 + increase in in nodular enhancing lesions throughout the brain parenchyma  See below.  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 The collection within the right gluteal muscle has unchanged while the fluid collection within the right iliacis muscle is increased somewhat in size.\" 2.7 cm  Cultures 3/29 and 4/4 neg  MRI on 5/20/2019 - interval decrease in collection in R gluteal muscle and persistent multiloculated collection in R iliacus muscle.   CT 5/28 no change  s/p drainage on 5/30/2019-cultures negative  S/p removal hardware 6/5-no cultures done  CT on 6/8 with residual abscess in the right iliacus muscle, 2.5 x 1.8 cm, slightly smaller than prior  s/p CT-guided deep bone biopsy 6/19/2019.  Cx +Mtb   Hip core +AFB  MRI 6/28 chronic discitis, diffuse OM L5, 3 and 4 cm abscesses right glute, 1.5 cm abscess or necrosis right posterior ileum, 3.3 cm abscess right SI joint  IR biopsy of R gluteal abscess/hip core tissue performed on 7/11, also with probe positive for MTB  Repeat MRI mid August along with brain as above  Discussed with state health department " on 8/8.  The organism is INH resistant.  Discussed with Dr. Wells. isoniazid and Levaquin discontinued on 8/8/19.    Continue rifampin, pyrazinamide, ethambutol for 9 to 12 months  Increased the dose of pyrazinamide to 25 mg/KG daily = 1250 mg/day per Dr. Wells's recommendations     Monitoring  CMP 8/7 with no concerning findings  Monitor for visual changes while on ethambutol    Patient evaluated by ophthalmology on 8/9 with normal eye exam    Type 2 DM, chronic  Hemoglobin A1c 6.3   Will adversely affect wound healing and resolution of infection    Okay to transfer patient from ID standpoint today-she will be transferred to [facility].     [facility] will also need to remain involved in her care as an outpatient    Follow-up in ID clinic in 2 weeks    Discussed with primary, Dr. Devi.  Will sign off

## 2019-08-10 NOTE — DISCHARGE PLANNING
Anticipated Discharge Disposition:   [facility]    Action:   Contacted by Sakshi Brown RN and he states that patient is medically cleared for transfer to [facility] today.  Patient has been seen by ophthalmologist.      Barriers to Discharge:   None    Plan:   Transportation communication form faxed to [facility].  Patient will be transporting by [facility] because she is not able to sit upright or in a wheelchair for any great extended amounts of time due to abscess of right iliac muscle and right gluteus muscle.      ADDENDUM:  PASSR [phone]

## 2019-08-10 NOTE — CARE PLAN
Problem: Communication  Goal: The ability to communicate needs accurately and effectively will improve  Outcome: PROGRESSING AS EXPECTED   Pt encouraged to use call light to make needs known.   Problem: Safety  Goal: Will remain free from injury  Outcome: PROGRESSING AS EXPECTED   Bed alarm on, bed locked in lowest position, upper side rails up, call light and personal items within reach, non skids socks on.

## 2019-08-10 NOTE — DISCHARGE SUMMARY
Discharge Summary    CHIEF COMPLAINT ON ADMISSION  No chief complaint on file.      Reason for Admission  iliac abscess     Admission Date  5/29/2019    CODE STATUS  DNAR/DNI    HPI & HOSPITAL COURSE  This is a 68 y.o. female here with iliac abscess.     Patient has underlying history of type 2 diabetes mellitus, hypertension, chronic obstructive pulmonary disease, history of deep venous thrombosis on anticoagulation with Xarelto, history of osteoarthritis, history of breast cancer status post left-sided mastectomy and reconstruction and anti-hormonal therapy.  In December 2018 patient sustained a pelvic fracture requiring transsacral screw fixation of her right sacral fracture S1, S2.  At that time patient was discharged to skilled nursing facility, readmitted to this facility in March 2019 and discharged in the end of April 2019 when she was noted to have concern for metastatic disease in the brain, at that time there was concern for infectious etiologies she was seen by infectious disease team, PICC line was placed and patient was discharged with prolonged IV antibiotic therapy.  Now patient with persistent pelvic abscess is a new brain enhancement concerning for worsening disease.  Infective endocarditis was ruled out previously.  Patient was not transferred to this facility on May 29, 2019, patient underwent CT-guided aspiration of the right pelvic abscess on May 30, 2019 with Dr. Finley.  Noted to have infected hardware in the pelvis status post hardware removal by Dr. Porter from orthopedics June 5, 2019.  Concern for osteomyelitis of the pelvis status post biopsy by IR on June 19/2019.  Persistent numerous enhancing lesions in the brain, neurosurgical team consulted and patient underwent right-sided craniotomy for resection of superficial mass with use of Stealth neuro navigation.  This was performed by Dr. Nguyen on June 28, 2019.  Subsequent, persistent gluteal abscess status post IR drainage.  PICC line  placement during this hospitalization.  Hospitalization complicated by pseudoaneurysm in the area of previous gluteal abscess status post thrombin injection by IR initial part of July 2019.  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  Patient was seen and closely followed by infectious disease team, pulmonology/critical care and neurosurgical team during this hospitalization.  From ID perspective, patient's brain lesions are present CNS tuberculosis with intermittent waxing and waning encephalopathy secondary to this.  Patient will need an interval MRI of the brain approximately around August 15, 2019 per ID recommendations.  Further, patient with gluteal and ileal psoas abscesses, osteomyelitis of L5, S1 and S3, skeletal tuberculosis with recurrent abscesses which will need ongoing management with recommendations for repeat MRI, skeletal mid August alongside MRI of the brain.  From a management perspective, infectious disease team has recommended the use of rifampin, pyrazinamide and ethambutol for the next 9 to 12 months.  Patient is recommended to follow-up with the health department after restriction in [facility].  From monitoring perspective, patient did undergo ophthalmology evaluation during hospitalization and will need outpatient follow-up with ophthalmology as reviewed below.  At this time patient is being accepted at a skilled nursing facility here in [facility], on August 10, 2019 I discussed the case with Dr. Hinojosa from infectious disease team over the telephone and she has cleared the patient for discharge to skilled nursing facility and she will be arranging for outpatient follow-up in infectious disease clinic for the patient.    Following discharge, patient will need weekly monitoring of CBC, CMP at the skilled nursing facility.  Anemia of chronic disease noted, hemoglobin of 10.5 last checked on August  "7, 2019, ongoing leukopenia with last WBC of 3.8 on August 7, 2019. Of note, patient with history of deep venous thrombosis, anticoagulation was stopped because of hip hematoma and Xarelto's interaction with rifampin.  Patient remains on DVT preventive prophylaxis with subcutaneous heparin, 5000 units 3 times a day at the time of discharge.  Recommend physician at the skilled nursing facility follow-up hemoglobin around August 15, 2019.  If no contraindications to initiation of anticoagulation, to consider initiation of Coumadin while maintaining DVT preventive prophylaxis.  Otherwise continue efforts to optimize nutrition.    From follow-up perspective, patient will need to maintain close outpatient follow-up with infectious disease team, [facility] (last seen by Dr Betancourt in the hospital).  In addition patient will need to maintain follow-up with neurosurgery, Dr. Nguyen.  Also to maintain follow-up with Dr. Antonio from ophthalmology in 3 months of hospital discharge \"[address] Neuro-ophthalmology. At that time can also obtain OCT evaluating the nerve fiber and ganglion cell thickness\".       Overall patient with poor prognosis, high risk of comorbidities, readmissions to the hospital and mortality.  Patient has an established CODE STATUS DNR/DNR.    Therefore, she is discharged in good and stable condition to skilled nursing facility.    The patient met 2-midnight criteria for an inpatient stay at the time of discharge.    Discharge Date  08/10/19    FOLLOW UP ITEMS POST DISCHARGE  To SNF / As noted above     DISCHARGE DIAGNOSES  Principal Problem:    Brain lesion POA: Yes  Active Problems:    Abscess of right iliac muscle and right gluteus medius muscle POA: Yes    Granulomatous disease  POA: Yes    COPD (chronic obstructive pulmonary disease) (HCC) POA: Yes    Essential hypertension POA: Yes    Dysphagia POA: Yes    Hypomagnesemia POA: Yes    Pseudoaneurysm following procedure " (HCC) POA: Yes    Osteomyelitis (HCC) POA: Yes    Drug induced optic neuropathy POA: Unknown    History of breast cancer POA: Yes    Chronic pain POA: Yes    RLS (restless legs syndrome) POA: Yes    History of DVT (deep vein thrombosis) POA: Yes    Non-severe protein-calorie malnutrition (HCC) POA: Yes  Resolved Problems:    * No resolved hospital problems. *      FOLLOW UP  No future appointments.  No follow-up provider specified.    MEDICATIONS ON DISCHARGE     Medication List      START taking these medications      Instructions   cyclobenzaprine 10 MG Tabs  Commonly known as:  FLEXERIL   Take 1 Tab by mouth 3 times a day as needed for Muscle Spasms.  Dose:  10 mg     ethambutol 400 MG Tabs  Start taking on:  8/11/2019  Commonly known as:  MYAMBUTOL   Take 2 Tabs by mouth every day.  Dose:  800 mg     heparin 5000 UNIT/ML Soln   Inject 1 mL as instructed every 8 hours.  Dose:  5,000 Units     levETIRAcetam 500 MG Tabs  Commonly known as:  KEPPRA   Take 1 Tab by mouth 2 Times a Day.  Dose:  500 mg     lidocaine 5 % Ptch  Commonly known as:  LIDODERM   Apply 2 Patches to skin as directed every 24 hours.  Dose:  2 Patch     magnesium chloride 64 MG Tbec  Commonly known as:  MAG-64   Take 1 Tab by mouth 2 Times a Day.  Dose:  64 mg     omeprazole 20 MG delayed-release capsule  Start taking on:  8/11/2019  Commonly known as:  PRILOSEC   Take 1 Cap by mouth every day.  Dose:  20 mg     oxyCODONE immediate-release 5 MG Tabs  Commonly known as:  ROXICODONE   Take 1-2 Tabs by mouth every 6 hours as needed for up to 3 days.  Dose:  5-10 mg     potassium chloride SA 20 MEQ Tbcr  Start taking on:  8/11/2019  Commonly known as:  Kdur   Take 1 Tab by mouth every day.  Dose:  20 mEq     * pyrazinamide 500 MG Tabs  Start taking on:  8/11/2019   Take 0.5 Tabs by mouth every day.  Dose:  250 mg     * pyrazinamide 500 MG Tabs  Start taking on:  8/11/2019   Take 2 Tabs by mouth every day.  Dose:  1,000 mg     pyridoxine 100 MG  tablet  Start taking on:  8/11/2019   Take 1 Tab by mouth every day.  Dose:  100 mg     riFAMPin 300 MG Caps  Start taking on:  8/11/2019  Commonly known as:  RIFADINE   Take 2 Caps by mouth every day.  Dose:  600 mg     senna-docusate 8.6-50 MG Tabs  Commonly known as:  PERICOLACE or SENOKOT S   Take 2 Tabs by mouth 2 Times a Day.  Dose:  2 Tab     temazepam 15 MG Caps  Commonly known as:  RESTORIL   Take 1 Cap by mouth every bedtime for 3 days.  Dose:  15 mg         * This list has 2 medication(s) that are the same as other medications prescribed for you. Read the directions carefully, and ask your doctor or other care provider to review them with you.            CHANGE how you take these medications      Instructions   amLODIPine 10 MG Tabs  Start taking on:  8/11/2019  What changed:    · medication strength  · how much to take  Commonly known as:  NORVASC   Take 1 Tab by mouth every day.  Dose:  10 mg     Cinacalcet HCl 60 MG Tabs  Start taking on:  8/11/2019  What changed:    · medication strength  · how much to take   Take 1 Tab by mouth every day.  Dose:  60 mg     gabapentin 400 MG Caps  What changed:    · medication strength  · how much to take  · when to take this  Commonly known as:  NEURONTIN   Take 1 Cap by mouth 3 times a day.  Dose:  400 mg     losartan 25 MG Tabs  Start taking on:  8/11/2019  What changed:    · medication strength  · how much to take  · additional instructions  Commonly known as:  COZAAR   Take 3 Tabs by mouth every day.  Dose:  75 mg        CONTINUE taking these medications      Instructions   atorvastatin 10 MG Tabs  Commonly known as:  LIPITOR   Take 10 mg by mouth every evening.  Dose:  10 mg     CULTURELLE PO   Take 1 Dose by mouth every day.  Dose:  1 Dose     ferrous sulfate 325 (65 Fe) MG tablet   Take 325 mg by mouth every day.  Dose:  325 mg     metFORMIN 500 MG Tabs  Commonly known as:  GLUCOPHAGE   Take 500 mg by mouth 2 times a day, with meals.  Dose:  500 mg        metoprolol 25 MG Tabs  Commonly known as:  LOPRESSOR   Take 25 mg by mouth 2 times a day. Hold for SBP <100 or HR <60  Dose:  25 mg     pramipexole 0.5 MG Tabs  Commonly known as:  MIRAPEX   Take 1 mg by mouth every bedtime.  Dose:  1 mg        STOP taking these medications    Diclofenac Sodium 1 % Gel     DULoxetine 30 MG Cpep  Commonly known as:  CYMBALTA     enoxaparin 40 MG/0.4ML Soln inj  Commonly known as:  LOVENOX     Lidocaine-Menthol 3.6-1.25 % Ptch     MELATONIN PO     mirtazapine 15 MG Tabs  Commonly known as:  REMERON     NON SPECIFIED     pantoprazole 40 MG Tbec  Commonly known as:  PROTONIX     rivaroxaban 15 MG Tabs tablet  Commonly known as:  XARELTO     tramadol 100 MG tablet  Commonly known as:  ULTRAM-ER            Allergies  No Known Allergies    DIET  Orders Placed This Encounter   Procedures   • Diet Order Regular     Standing Status:   Standing     Number of Occurrences:   1     Order Specific Question:   Diet:     Answer:   Regular [1]     Order Specific Question:   Consistency/Fluid modifications:     Answer:   Thin Liquids [3]     Order Specific Question:   Miscellaneous modifications:     Answer:   SLP - Deliver to Nursing Station [22]     Comments:   help pt with HOB and setting up tray/cutting food if needed       ACTIVITY  As tolerated.  Weight bearing as tolerated    CONSULTATIONS  Neurosurgery   Ophthalmology   Pulmonology    PROCEDURES  As noted    LABORATORY  Lab Results   Component Value Date    SODIUM 138 08/07/2019    POTASSIUM 4.0 08/07/2019    CHLORIDE 106 08/07/2019    CO2 25 08/07/2019    GLUCOSE 96 08/07/2019    BUN 28 (H) 08/07/2019    CREATININE 0.80 08/07/2019        Lab Results   Component Value Date    WBC 3.8 (L) 08/07/2019    HEMOGLOBIN 10.5 (L) 08/07/2019    HEMATOCRIT 32.9 (L) 08/07/2019    PLATELETCT 175 08/07/2019        Total time of the discharge process exceeds 45 minutes.

## 2019-08-10 NOTE — PROGRESS NOTES
Patient has been looking for a ring that she states she turned in for safe keep when she was admitted. I do not see a slip on her chart where it would have been turn in to security. I called security and spoke with them about the ring. Security states they wouldn't have taken the ring and did not see any record of them having the ring. I have asked the patient for the last two days to call her family to see if they have the ring. Patient states her family couldn't have the ring and has not called them about the ring. Security stated they will look around and call me back if they find something. Patient has been informed about this information.

## 2019-08-10 NOTE — PROGRESS NOTES
Assumed pt care at 1900 and received bedside report.     Pt alert and oriented X 4 with intermittent confusion. Pt denies chest pain, sob, nausea and vomiting, headache, and blurry or double vision. Pt denies numbness and tingling. Pt c/o spasms to legs, medicating per MAR. Pt ambulating x1 with FWW. Pt incontinent at times.   POC discussed and education provided on administered medications. All questions and concerns addressed. Fall precautions, hourly rounding and Q4 hour neuro checks in place.

## 2019-08-19 ENCOUNTER — TELEPHONE (OUTPATIENT)
Dept: INFECTIOUS DISEASES | Facility: MEDICAL CENTER | Age: 69
End: 2019-08-19

## 2019-08-19 NOTE — TELEPHONE ENCOUNTER
Called and spoke to Lida at the TB clinic at the [facility] to inform [facility] that patient transferred to [facility] SNF and did not transfer to a SNF in [place].

## 2019-08-21 LAB
MYCOBACTERIUM SPEC CULT: ABNORMAL
RHODAMINE-AURAMINE STN SPEC: ABNORMAL
RHODAMINE-AURAMINE STN SPEC: ABNORMAL
SIGNIFICANT IND 70042: ABNORMAL
SIGNIFICANT IND 70042: ABNORMAL
SITE SITE: ABNORMAL
SITE SITE: ABNORMAL
SOURCE SOURCE: ABNORMAL
SOURCE SOURCE: ABNORMAL

## 2019-08-23 LAB
MYCOBACTERIUM SPEC CULT: NORMAL
RHODAMINE-AURAMINE STN SPEC: NORMAL
SIGNIFICANT IND 70042: NORMAL
SITE SITE: NORMAL
SOURCE SOURCE: NORMAL

## 2019-08-30 ENCOUNTER — HOSPITAL ENCOUNTER (OUTPATIENT)
Dept: RADIOLOGY | Facility: MEDICAL CENTER | Age: 69
End: 2019-08-30
Attending: FAMILY MEDICINE
Payer: MEDICARE

## 2019-08-30 DIAGNOSIS — G93.9 DISEASE OF BRAIN: ICD-10-CM

## 2019-08-30 DIAGNOSIS — A18.01: ICD-10-CM

## 2019-08-30 DIAGNOSIS — A18.03 TUBERCULOSIS OF MASTOID, BACTERIOLOGICAL OR HISTOLOGICAL EXAMINATION NOT DONE: ICD-10-CM

## 2019-08-30 PROCEDURE — 70551 MRI BRAIN STEM W/O DYE: CPT

## 2019-08-30 PROCEDURE — 72141 MRI NECK SPINE W/O DYE: CPT

## 2019-09-06 NOTE — DOCUMENTATION QUERY
[facility]                                                                       Query Response Note      PATIENT:               MARLENA CLIFFORD  ACCT #:                  [id number]  MRN:                     [id number]  :                      1950  ADMIT DATE:       2019 4:43 PM  DISCH DATE:        8/10/2019 4:45 PM  RESPONDING  PROVIDER #:        [id number]           QUERY TEXT:    Nonhealing right iliac fracture is documented on PN dated 19.  Subacute fracture of the right iliac crest with partial healing is documented on PN dated 19. Chronic right iliac fracture is also documented on PN dated 19.  Please specify the _type and status_ of  the fracture.    NOTE:  If an appropriate response is not listed below, please respond with a new note.    The patient's Clinical Indicators include:  Possible infection due to pelvic hardware  Recurrent right Iliac/gluteal muscle abscess  Options provided:   -- Chronic, pathologic, delayed healing   -- Chronic, pathologic, malunion   -- Chronic, pathologic, nonunion   -- Chronic, patholooic, routine healing   -- Chronic, traumatic, delayed healing   [[Chronic, traumatic, nonunion   -- Chronic, traumatic, routine healing   -- Other explanation of clinical findings   -- Unable to determine      Query created by: Sawallisch, Beth on 2019 8:00 AM    RESPONSE TEXT:    Unable to determine          Electronically signed by:  NELLI GAO 2019 1:29 PM

## 2019-09-20 ENCOUNTER — TELEPHONE (OUTPATIENT)
Dept: OPHTHALMOLOGY | Facility: MEDICAL CENTER | Age: 69
End: 2019-09-20

## 2019-10-08 ENCOUNTER — APPOINTMENT (OUTPATIENT)
Dept: RADIOLOGY | Facility: MEDICAL CENTER | Age: 69
End: 2019-10-08
Attending: FAMILY MEDICINE
Payer: MEDICARE

## 2019-10-08 PROCEDURE — 72158 MRI LUMBAR SPINE W/O & W/DYE: CPT

## 2019-10-08 PROCEDURE — 72157 MRI CHEST SPINE W/O & W/DYE: CPT

## 2019-10-08 PROCEDURE — 700117 HCHG RX CONTRAST REV CODE 255

## 2019-10-08 PROCEDURE — A9576 INJ PROHANCE MULTIPACK: HCPCS

## 2019-10-08 RX ADMIN — GADOTERIDOL 10 ML: 279.3 INJECTION, SOLUTION INTRAVENOUS at 14:47

## 2019-11-18 NOTE — PROGRESS NOTES
OS  + 1.50 + 0.25 x 175 Palliative Progress Note               Author: Analy Penny Date & Time created: 2018  11:28 AM     Reason for subsequent visit: Lower back pain    Interval History:  No acute events overnight.  Patient is sitting up in chair at bedside knitting.  She reports her right leg pain has improved but she is still complaining of left leg weakness which is chronic.  Patient reports her lower back pain has improved significantly following surgery.  She denies low back pain currently but continues to have mild pain with ambulation and movement.      Review of Systems:  Negative for dyspnea. Positive for pain (low back). Negative for anorexia. Negative for fatigue. Negative for sedation. Negative for anxiety. Negative for insomnia. Negative for constipation.      Physical Exam:    Recent vital signs  Temp (24hrs), Av.6 °C (97.9 °F), Min:36.1 °C (96.9 °F), Max:37.5 °C (99.5 °F)  Temperature: 36.1 °C (96.9 °F)  Pulse  Av.7  Min: 59  Max: 116   Blood Pressure : 138/60       Physical Exam   Constitutional: She is oriented to person, place, and time. She appears well-developed. No distress.   Cardiovascular: Normal rate.    Pulmonary/Chest: No respiratory distress.   Abdominal: She exhibits no distension.   Musculoskeletal: She exhibits no edema.   Neurological: She is alert and oriented to person, place, and time.   Psychiatric: She has a normal mood and affect. Her speech is normal and behavior is normal. Judgment and thought content normal. Cognition and memory are normal.     Recent Labs      18   0400  18   0538  18   0449   SODIUM  133*  133*  133*   POTASSIUM  4.1  4.1  4.2   CHLORIDE  94*  94*  96   CO2  33  29  31   GLUCOSE  131*  145*  126*   BUN  26*  35*  28*   CREATININE  0.80  0.83  0.82   CALCIUM  9.5  9.2  9.4     Recent Labs      18   0400  18   0538  18   0449   WBC  10.3  6.5  5.4   RBC  4.33  4.52  4.50   HEMOGLOBIN  10.5*  11.3*  11.3*   HEMATOCRIT  34.6*   36.6*  36.5*   MCV  79.9*  81.0*  81.1*   MCH  24.2*  25.0*  25.1*   MCHC  30.3*  30.9*  31.0*   RDW  45.4  47.9  48.3   PLATELETCT  315  344  330   MPV  9.5  10.1  10.1     Medications reviewed. Labs Reviewed.     Problem List:  1. Sacral fracture with possible bony metastasis (active)  2. Severe right hip pain (stable)  3. History of left breast cancer status post mastectomy, reconstruction surgery and hormonal therapy (chronic)  4. History of HTN (chronic)  5. History of diabetes (chronic)  6. History of chronic pain (chronic)  7. Hyperlipidemia (chronic)  8. Opioid-induced constipation (resolved) - last bowel movement this morning    Assessment/Plan:  Sacral fracture with possible bony metastasis  Postop day #3 for sacral fracture fixation  Bone scan was inconclusive for metastasis  Patient is working with physical therapy and planning on skilled nursing facility for post acute therapy  Discussed need for follow-up with her oncologist for additional PET scan  Patient reports her last PET scan 2 years ago did not show cancer recurrence    Severe right hip pain   MEDD (morphine equivalent daily dose) is approximately 30 mg:  -Oxycodone 10 mg x 2 doses  = 20 mg =30 mg MEDD    Continue with current oxycodone as needed for breakthrough pain  Heart stop on ketorolac today  Continue gabapentin and tizanidine at current doses    Code Status: DNR/DNI    Advance Care Planning (ACP)/Goals of Care (GOC): Patient's Advance Directive was completed and scanned into her medical record on 12/19/2018.  She elected for her brother Arsh Hsu ([phone]) to be her DPOA-HC as her  is struggling with dementia.  Patient is eager to have some post acute rehabilitation as she feels weak and unable to care for her  currently.  Her  is being cared for by friends and neighbors at their home.  He has been able to visit her several times during this hospitalization.      Reviewed POLST form with patient.  Discussed  similarities and differences from AD.  Patient elected to complete form.  Choices included DNR/DNI, selective focused treatment, no feeding tube, trial of IV fluids.  We did discuss the possibility of her needing to complete a POLST form for [place] if her care team in [place] does not except [place] POLST.  At that time the patient's brother called.  I introduced myself and reviewed form with him as well.  Arsh expressed he is coming up from the [place] area.  He inquired about postacute services.  We discussed the recommendation for SNF which he was pleased about.  All questions answered.  18 minutes of total visit time was spent discussing advance care planning/goals of care and completing POLST.  Copy of POLST scanned into EMR and original left on hard chart.  Patient instructed to d/c with POLST and place on refrigerator once home.     Thank you for allowing the palliative care team to participate in this patient's care. Please call with any questions/needs.     Total visit time was 33 minutes.  Greater than 50% of today's visit was spent counseling and coordinating the patient's care regarding symptom management and plan of care.   Please refer to interval history and assessment/plan for details.    KRIS Cuba.  Palliative Care Nurse Practitioner  [phone]

## 2020-04-16 NOTE — THERAPY
"Pt demonstrated decreased cognition, affecting sequencing and attention to task. Pt demonstrated posterior lean upon standing, requiring faciliutation, verbal and tactile cuing, and sequencing. Pt w/minimal to no foot clearance to take a step, only minimally shuffle. Pt continues to require full assistance for mobility. Pt would benefit from further acute PT txs to porgress towards goals and independence. Recommend post acute placement at an inpatient transitional care facility to address deficits.    Physical Therapy Treatment completed.   Bed Mobility:  Supine to Sit:  (NT--recieved in chair)  Transfers: Sit to Stand: Moderate Assist  Gait: Level Of Assist: Unable to Participate        Plan of Care: Will benefit from Physical Therapy 3 times per week    See \"Rehab Therapy-Acute\" Patient Summary Report for complete documentation.       " Reason for Consult:  Asked by Dr Dino Calderón M.D. to see this patient with HFrEF    CC:   Chief Complaint   Patient presents with   • Chest Pain       HPI:     47 year old woman with moyamoya, hx cva, kenny on ckd, HTN, HLD, former +40 pack year smoker presents with chest pain, orthopnea, dyspnea, leg swelling, and weight gain. She has been admitted multiple times this month since onset about 4/2. Has not improved. Underwent nuclear stress and echo, see below with recurrent finding of HFrEF (previously known in 2013). She denies illicit drugs and alcohol. States diabetes runs in her family.    Medications / Drug list prior to admission:  No current facility-administered medications on file prior to encounter.      Current Outpatient Medications on File Prior to Encounter   Medication Sig Dispense Refill   • folic acid (FOLVITE) 1 MG Tab Take 1 mg by mouth two times a day may change times on alt/days.     • Lysine 500 MG Cap Take  by mouth every day.     • LORazepam (ATIVAN) 1 MG Tab Take 1 Tab by mouth 2 Times a Day for 180 days. 60 Tab 3   • escitalopram (LEXAPRO) 10 MG Tab Take 1 Tab by mouth every day. 90 Tab 2   • levETIRAcetam (KEPPRA) 500 MG Tab 500mg in AM and 1000mg in  Tab 2   • phenytoin ER (DILANTIN) 100 MG Cap Take 1 Cap by mouth 3 times a day. 270 Cap 4   • atorvastatin (LIPITOR) 40 MG Tab Take 1 Tab by mouth every day. 90 Tab 2   • Melatonin 5 MG Tab Take  by mouth every bedtime. Indications: Trouble Sleeping     • coenzyme Q-10 30 MG capsule Take 60 mg by mouth every day.     • Esomeprazole Magnesium (NEXIUM 24HR PO) Take  by mouth every day. noon     • Ascorbic Acid (VITAMIN C) 1000 MG Tab Take  by mouth every day.     • calcium citrate (CALCITRATE) 950 MG Tab Take 950 mg by mouth every day.     • carvedilol (COREG) 25 MG Tab Take 1 Tab by mouth 3 times a day. 60 Tab    • lisinopril (PRINIVIL) 20 MG Tab Take 1 Tab by mouth 2 Times a Day. 30 Tab    • aspirin (ASA) 325 MG Tab Take 1 Tab by mouth  every day. 100 Tab    • sodium bicarbonate (SODIUM BICARBONATE) 650 MG Tab Take 1 Tab by mouth 3 times a day. 120 Tab 3   • acetaminophen (TYLENOL) 325 MG Tab Take 2 Tabs by mouth every 6 hours as needed (Mild Pain; (Pain scale 1-3); Temp greater than 100.5 F). 30 Tab 0   • magnesium hydroxide (MILK OF MAGNESIA) 400 MG/5ML Suspension Take 30 mL by mouth 1 time daily as needed (if polyethylene glycol ineffective after 24 hours). 1 Bottle    • albuterol 108 (90 BASE) MCG/ACT Aero Soln inhalation aerosol Inhale 2 Puffs by mouth every four hours as needed for Shortness of Breath. 8.5 g        Current list of administered Medications:    Current Facility-Administered Medications:   •  albuterol inhaler 2 Puff, 2 Puff, Inhalation, Q4HRS PRN, Masood Yarbrough M.D.  •  aspirin (ASA) tablet 325 mg, 325 mg, Oral, DAILY, Masood Yarbrough M.D., 325 mg at 04/16/20 0636  •  atorvastatin (LIPITOR) tablet 40 mg, 40 mg, Oral, DAILY, Masood Yarbrough M.D., 40 mg at 04/16/20 0636  •  escitalopram (LEXAPRO) tablet 10 mg, 10 mg, Oral, DAILY, Masood Yarbrough M.D., 10 mg at 04/16/20 0636  •  levETIRAcetam (KEPPRA) tablet 500 mg, 500 mg, Oral, QAM, Masood Yarbrough M.D., 500 mg at 04/16/20 0637  •  phenytoin ER (DILANTIN) capsule 100 mg, 100 mg, Oral, TID, Masood Yarbrough M.D., 100 mg at 04/16/20 1311  •  senna-docusate (PERICOLACE or SENOKOT S) 8.6-50 MG per tablet 2 Tab, 2 Tab, Oral, BID, 2 Tab at 04/16/20 0636 **AND** polyethylene glycol/lytes (MIRALAX) PACKET 1 Packet, 1 Packet, Oral, QDAY PRN **AND** magnesium hydroxide (MILK OF MAGNESIA) suspension 30 mL, 30 mL, Oral, QDAY PRN **AND** bisacodyl (DULCOLAX) suppository 10 mg, 10 mg, Rectal, QDAY PRN, Masood Yarbrough M.D.  •  Respiratory Therapy Consult, , Nebulization, Continuous RT, Masood Yarbrough M.D.  •  heparin injection 5,000 Units, 5,000 Units, Subcutaneous, Q8HRS, Masood Yarbrough M.D., 5,000 Units at 04/16/20 1310  •  acetaminophen (TYLENOL) tablet 650 mg, 650 mg, Oral, Q6HRS PRN, Masood Yarbrough M.D.  •   aminophylline injection 100 mg, 100 mg, Intravenous, Q5 MIN PRN, Masood Yarbrough M.D.  •  omeprazole (PRILOSEC) capsule 20 mg, 20 mg, Oral, QAM AC, Masood Yarbrough M.D., 20 mg at 04/16/20 0637  •  hydrALAZINE (APRESOLINE) injection 20 mg, 20 mg, Intravenous, Q6HRS PRN, Masood Yarbrough M.D.  •  levETIRAcetam (KEPPRA) tablet 1,000 mg, 1,000 mg, Oral, Q EVENING, Dino Calderón M.D.  •  albuterol (PROVENTIL) 2.5mg/0.5ml nebulizer solution 2.5 mg, 2.5 mg, Nebulization, Q4H PRN (RT), Dino Calderón M.D.  •  [START ON 4/17/2020] metoprolol SR (TOPROL XL) tablet 25 mg, 25 mg, Oral, Q DAY, Jared Palma M.D.  •  furosemide (LASIX) injection 40 mg, 40 mg, Intravenous, Q DAY, Jared Palma M.D.  •  hydrALAZINE (APRESOLINE) tablet 10 mg, 10 mg, Oral, Q8HRS, Jared Palma M.D.  •  isosorbide dinitrate (ISORDIL) tablet 10 mg, 10 mg, Oral, TID, Jared Palma M.D.    Past Medical History:   Diagnosis Date   • CKD (chronic kidney disease), stage IV ([facility]) 12/24/2015   • H/O: CVA (cerebrovascular accident) 12/24/2015   • Hypertension    • Kidney disease    • Mild mitral regurgitation 7/14/2014   • Moyamoya disease 10/3/2013   • Seizure disorder, grand mal (HCC) 3/3/2016   • Stroke (HCC)        Past Surgical History:   Procedure Laterality Date   • IRRIGATION & DEBRIDEMENT ORTHO Right 3/9/2017    Procedure: IRRIGATION & DEBRIDEMENT ORTHO - FOOT;  Surgeon: Gerardo Lynn M.D.;  Location: SURGERY [facility];  Service:    • IRRIGATION & DEBRIDEMENT ORTHO Right 3/5/2017    Procedure: IRRIGATION & DEBRIDEMENT ORTHO AND GASTROC RECESSION;  Surgeon: Gerardo Lynn M.D.;  Location: SURGERY [facility];  Service:    • METATARSAL HEAD RESECTION  3/5/2017    Procedure: SECOND METATARSAL HEAD RESECTION;  Surgeon: Gerardo Lynn M.D.;  Location: [facility];  Service:    • IRRIGATION & DEBRIDEMENT ORTHO Right 2/27/2017    Procedure: IRRIGATION & DEBRIDEMENT ORTHO;  Surgeon: Coleman Monterroso M.D.;  [place];  Service:    • PRIMARY C SECTION     • TUBAL COAGULATION LAPAROSCOPIC BILATERAL         Family History   Problem Relation Age of Onset   • Diabetes Mother    • Hypertension Mother    • Hypertension Father    • Arthritis Father    • Diabetes Maternal Grandmother    • Psychiatric Illness Maternal Grandfather    • Cancer Paternal Grandmother    • Psychiatric Illness Paternal Grandfather      Patient family history was personally reviewed, no pertinent family history to current presentation    Social History     Socioeconomic History   • Marital status: Single     Spouse name: Not on file   • Number of children: Not on file   • Years of education: Not on file   • Highest education level: Not on file   Occupational History   • Not on file   Social Needs   • Financial resource strain: Not on file   • Food insecurity     Worry: Not on file     Inability: Not on file   • Transportation needs     Medical: Not on file     Non-medical: Not on file   Tobacco Use   • Smoking status: Former Smoker   • Smokeless tobacco: Never Used   Substance and Sexual Activity   • Alcohol use: No   • Drug use: No   • Sexual activity: Not on file   Lifestyle   • Physical activity     Days per week: Not on file     Minutes per session: Not on file   • Stress: Not on file   Relationships   • Social connections     Talks on phone: Not on file     Gets together: Not on file     Attends Protestant service: Not on file     Active member of club or organization: Not on file     Attends meetings of clubs or organizations: Not on file     Relationship status: Not on file   • Intimate partner violence     Fear of current or ex partner: Not on file     Emotionally abused: Not on file     Physically abused: Not on file     Forced sexual activity: Not on file   Other Topics Concern   • Not on file   Social History Narrative   • Not on file       ALLERGIES:  Allergies   Allergen Reactions   • Allegra [Fexofenadine] Unspecified     Rxn = unknown   •  "Amoxicillin    • Azithromycin Unspecified     Rxn = unknown   • Claritin    • Erythromycin Unspecified     Rxn = unknown   • Ibuprofen & Caffeine-Vitamins Unspecified     Rxn = unknown   • Penicillins Unspecified     Rxn = unknown   • Procardia [Nifedipine]    • Rosemary Oil Anaphylaxis     Throat starts to close/swell.  Oil and the herb.    • Zyrtec [Cetirizine] Unspecified     Rxn = unknown       Review of systems:  A complete review of symptoms was reviewed with patient. This is reviewed in H&P and PMH. ALL OTHERS reviewed and negative    Physical exam:  Patient Vitals for the past 24 hrs:   BP Temp Temp src Pulse Resp SpO2 Height Weight   04/16/20 1542 -- -- -- 60 18 96 % -- --   04/16/20 1136 137/76 36.5 °C (97.7 °F) Temporal (!) 51 18 97 % -- --   04/16/20 0742 131/70 36.5 °C (97.7 °F) Temporal (!) 55 18 95 % -- --   04/16/20 0601 157/96 36.5 °C (97.7 °F) Temporal 68 18 97 % 1.753 m (5' 9\") 122.7 kg (270 lb 8.1 oz)   04/16/20 0400 (!) 169/87 -- -- 68 20 99 % -- --   04/16/20 0300 158/83 -- -- 62 18 96 % -- --   04/16/20 0235 (!) 168/87 -- -- 65 18 98 % -- --   04/16/20 0210 (!) 177/95 36.7 °C (98.1 °F) Temporal 63 (!) 22 99 % -- --   04/16/20 0208 -- -- -- -- -- -- 1.753 m (5' 9\") 114.8 kg (253 lb)     General: No acute distress.   EYES: no jaundice  HEENT: OP clear   Neck: No bruits No JVD.   CVS:  RRR. S1 + S2. No M/R/G. 1+ edema.  Resp: CTAB. No wheezing or crackles/rhonchi.  Abdomen: Soft, NT, ND,  Skin: Grossly nothing acute no obvious rashes  Neurological: Alert, Moves all extremities, no cranial nerve defects on limited exam  Extremities: Pulse 2+ in b/l LE. No cyanosis.     Data:  Laboratory studies personally reviewed by me:  Recent Results (from the past 24 hour(s))   EKG    Collection Time: 04/16/20  2:02 AM   Result Value Ref Range    Report       [facility] Emergency Dept.    Test Date:  2020-04-16  Pt Name:    ELIJAH BOWLING             Department: ER  MRN:        [id number]    "                   Room:       [facility]  Gender:     Female                       Technician: [id number]  :        1973                   Requested By:ER TRIAGE PROTOCOL  Order #:    [id number]                    Reading MD:    Measurements  Intervals                                Axis  Rate:       67                           P:  CT:                                      QRS:        33  QRSD:       110                          T:          232  QT:         416  QTc:        439    Interpretive Statements  ATRIAL FIBRILLATION, V-RATE  66- 68  VENTRICULAR PREMATURE COMPLEX  INCOMPLETE LEFT BUNDLE BRANCH BLOCK  LOW VOLTAGE THROUGHOUT  BORDERLINE R WAVE PROGRESSION, ANTERIOR LEADS  Compared to ECG 2017 12:30:26  Ventricular premature complex(es) now present  Left bundle-branch block now present  Low QRS voltage now present  Sinus rhyt hm no longer present  Myocardial infarct finding no longer present  T-wave abnormality no longer present     CBC WITH DIFFERENTIAL    Collection Time: 20  2:12 AM   Result Value Ref Range    WBC 7.7 4.8 - 10.8 K/uL    RBC 3.45 (L) 4.20 - 5.40 M/uL    Hemoglobin 11.3 (L) 12.0 - 16.0 g/dL    Hematocrit 33.6 (L) 37.0 - 47.0 %    MCV 97.4 81.4 - 97.8 fL    MCH 32.8 27.0 - 33.0 pg    MCHC 33.6 33.6 - 35.0 g/dL    RDW 53.2 (H) 35.9 - 50.0 fL    Platelet Count 147 (L) 164 - 446 K/uL    MPV 10.7 9.0 - 12.9 fL    Neutrophils-Polys 67.00 44.00 - 72.00 %    Lymphocytes 22.20 22.00 - 41.00 %    Monocytes 9.10 0.00 - 13.40 %    Eosinophils 1.20 0.00 - 6.90 %    Basophils 0.10 0.00 - 1.80 %    Immature Granulocytes 0.40 0.00 - 0.90 %    Nucleated RBC 0.00 /100 WBC    Neutrophils (Absolute) 5.16 2.00 - 7.15 K/uL    Lymphs (Absolute) 1.71 1.00 - 4.80 K/uL    Monos (Absolute) 0.70 0.00 - 0.85 K/uL    Eos (Absolute) 0.09 0.00 - 0.51 K/uL    Baso (Absolute) 0.01 0.00 - 0.12 K/uL    Immature Granulocytes (abs) 0.03 0.00 - 0.11 K/uL    NRBC (Absolute) 0.00 K/uL   Comp Metabolic Panel    Collection  Time: 20  3:05 AM   Result Value Ref Range    Sodium 140 135 - 145 mmol/L    Potassium 4.1 3.6 - 5.5 mmol/L    Chloride 106 96 - 112 mmol/L    Co2 14 (L) 20 - 33 mmol/L    Anion Gap 20.0 (H) 7.0 - 16.0    Glucose 99 65 - 99 mg/dL    Bun 91 (HH) 8 - 22 mg/dL    Creatinine 5.69 (HH) 0.50 - 1.40 mg/dL    Calcium 8.6 8.5 - 10.5 mg/dL    AST(SGOT) 16 12 - 45 U/L    ALT(SGPT) 31 2 - 50 U/L    Alkaline Phosphatase 132 (H) 30 - 99 U/L    Total Bilirubin 0.3 0.1 - 1.5 mg/dL    Albumin 3.2 3.2 - 4.9 g/dL    Total Protein 6.1 6.0 - 8.2 g/dL    Globulin 2.9 1.9 - 3.5 g/dL    A-G Ratio 1.1 g/dL   TROPONIN    Collection Time: 20  3:05 AM   Result Value Ref Range    Troponin T 66 (H) 6 - 19 ng/L   proBrain Natriuretic Peptide, NT    Collection Time: 20  3:05 AM   Result Value Ref Range    NT-proBNP >07713 (H) 0 - 125 pg/mL   ESTIMATED GFR    Collection Time: 20  3:05 AM   Result Value Ref Range    GFR If African American 10 (A) >60 mL/min/1.73 m 2    GFR If Non  8 (A) >60 mL/min/1.73 m 2   Troponin in two (2) hours    Collection Time: 20  5:15 AM   Result Value Ref Range    Troponin T 70 (H) 6 - 19 ng/L   EKG in four (4) hours    Collection Time: 20  6:37 AM   Result Value Ref Range    Report       [facility]    Test Date:  2020  Pt Name:    ELIJAH BOWLING             Department: ER  MRN:        [id number]                      Room:       T828  Gender:     Female                       Technician: PEP  :        1973                   Requested By:JULIÁN FERRER  Order #:    [id number]                    Reading MD: Martha Cruz    Measurements  Intervals                                Axis  Rate:       65                           P:          56  LA:         155                          QRS:        89  QRSD:       115                          T:          -48  QT:         438  QTc:        456    Interpretive Statements  SINUS RHYTHM  INCOMPLETE LEFT BUNDLE  BRANCH BLOCK  LOW VOLTAGE, PRECORDIAL LEADS  Nonspecific T wave changes  Rightward axis  Electronically Signed On 4- 11:52:07 PDT by Martha Cruz     Troponin in four (4) hours    Collection Time: 04/16/20  8:42 AM   Result Value Ref Range    Troponin T 61 (H) 6 - 19 ng/L   EC-ECHOCARDIOGRAM COMPLETE W/ CONT    Collection Time: 04/16/20  1:37 PM   Result Value Ref Range    Eject.Frac. MOD BP 45.84     Eject.Frac. MOD 4C 44.49     Eject.Frac. MOD 2C 48.34     Left Ventrical Ejection Fraction 30        EKG : personally reviewed by me sinus, nonspecific TWI, incomplete LBBB    All pertinent features of laboratory and imaging reviewed including primary images where applicable    TTE 4/16/20  CONCLUSIONS  Compared to the images of the prior study done 2/27/2017, LV is   moderately dilated, EF is severely reduced, grade 3 diastolic   dysfunction present.  Technically challenging study, improved with contrast.  Left ventricle is moderately dilated at 6 cm.  Severely reduced left ventricular systolic function.  Left ventricular ejection fraction is visually estimated to be 30%.  Global hypokinesis with further hypokinesis of the basal to mid   inferior wall and inferior septum.  Grade III diastolic dysfunction (restrictive pattern).  Right ventricle not well visualized, however, appears mildly dilated in   size and reduced in systolic function.  Biatrial enlargement.   Mild to moderate mitral regurgitation.  Mild tricuspid regurgitation.  Estimated right ventricular systolic pressure is 42 mmHg.  Right atrial pressure is estimated to be 15 mmHg.  Ascending aorta is borderline dilated with a diameter of 3.8 cm.    Nuclear stress spect stephie 4/16/20   NUCLEAR IMAGING INTERPRETATION   No evidence of ischemia.    Inferoapical photopenia is likely related to attenuation artifact.    Mild diffuse hypokinesis with left ventricle ejection fraction of 43%.   Sinus bradycardia, nonspecific intraventricular conduction delay,  rare PVCs.   Nondiagnostic ECG portion of a regadenoson nuclear stress test.   ECG INTERPRETATION   Nondiagnostic ECG portion of a regadenoson nuclear stress test.    Active Problems:    Chest pain POA: Yes    Moyamoya POA: Yes      Overview: IMO load March 2020    Obesity (BMI 30-39.9) POA: Yes    Acute-on-chronic kidney injury (HCC) POA: Yes    Dilated cardiomyopathy (HCC) POA: Yes      Overview: 1/29/2014: EF 45-50%    HLD (hyperlipidemia) POA: Yes    HTN (hypertension) POA: Yes    H/O: CVA (cerebrovascular accident) POA: Yes    Seizure disorder, grand mal (HCC) POA: Yes  Resolved Problems:    * No resolved hospital problems. *      Assessment / Plan:    47 year old woman with moyamoya, hx cva, fredy on ckd, HTN, HLD, former +40 pack year smoker presents with chest pain, orthopnea, dyspnea, leg swelling, and weight gain found HFrEF 30%, pHTN, RVF.    -renal function prohibits initiation of HF-OMT; please consult nephrology  -add afterload reducers hydralazine/isordil; titrate as tolerated  -start lasix IV; goal negative 1L daily  -await FREDY resolution to initiate ARB and MRA  -when euvolemic will check R/LHC  -check a1c, tsh, lipids    I personally discussed her case with Dr Calderón    It is my pleasure to participate in the care of Ms. Acevedo.  Please do not hesitate to contact me with questions or concerns.    Jared Palma MD  Cardiologist [facility]

## 2020-09-25 NOTE — THERAPY
"Physical Therapy Treatment completed.   Bed Mobility:  Supine to Sit: Supervised (HOB flat and no railing)  Transfers: Sit to Stand: Minimal Assist (from EOB->FWW)  Gait: Level Of Assist: Minimal Assist with Front-Wheel Walker       Plan of Care: Will benefit from Physical Therapy 3 times per week  Discharge Recommendations: Equipment: Will Continue to Assess for Equipment Needs. Post-acute therapy Discharge to a transitional care facility for continued skilled therapy services.     See \"Rehab Therapy-Acute\" Patient Summary Report for complete documentation.       " Post-Op Assessment Note    CV Status:  Stable    Pain management: adequate     Mental Status:  Alert and awake   Hydration Status:  Euvolemic   PONV Controlled:  Controlled   Airway Patency:  Patent      Post Op Vitals Reviewed: Yes      Staff: Anesthesiologist       Vitals:    09/24/20 1901   BP: 124/60   Pulse: 73   Resp: 18   Temp:    SpO2: 83%     No complications documented

## 2021-01-08 NOTE — PROGRESS NOTES
Hospital Medicine Daily Progress Note    Date of Service  6/9/2019    Chief Complaint  68 y.o. female admitted 5/29/2019 with persistent abscess.    Hospital Course     Ms. Martini is a 68-year-old female who was transferred from [facility] on 5/29/2019 with persistent right gluteal abscesses since sacral fracture repair in December.  She has had multiple IR drainage.  She initially presented with pelvic pain, low back pain, and confusion. She also had new slurred speech. Imaging of the abdomen, pelvis and brain revealed abscesses and she was treated with a full course of IV antibiotics per ID.  Her slurred speech improved and she was able to ambulate with physical therapy. However, repeat imaging showed persistent pelvic abscesses.  MRI brain shows increase in brain lesions.  Her brain lesions are known from prior imaging and there is concern they represent metastasis.  Oncology aware and do not recommend further imaging. Cultures remained negative and a CHAS done by Dr. Potter showed no vegetations. The size of the pelvic abscesses was reviewed by interventional radiology and the case was discussed with Dr. Finley who recommended transfer to [facility] for CT guided drainage.  S/P drainage on 5/30. On 6/4, febrile again, so restarted cefepime and vancomycin per ID and has been on them since.  Right hip hardware removal 6/5. MRI brain repeat 6/7/19 saw progression of the supra and infratentorial intra-axial nodules with edema, prompting a Neurosurgery consult.  Dr. Nguyen stated lesions not amendable to bx stereotactically, favors infectious, recs abx, antineuroleptic and repeat brain MRI in 2 weeks (around 6/21).  CT pelvis 6/9 with residual fluid collection 2.5 x 1.8 cm in right iliac m., slightly smaller than before.  Cefepime + vanc continue. Now broaden work up to include fungal and empiric started fluconazole.        Interval Problem Update  No more diarrhea    Consultants/Specialty  Infectious disease  Orthopedic  Patient is a 74y old  Female who presents with a chief complaint of acute on chronic low back pain (08 Jan 2021 05:28)      HPI:  74F with past medical history of HTN, HLD, SLE, RA, hypothyroidism, provoked R. popliteal DVT in 2019 on eliquis, chronic low back pain presents to ED for acute on chronic low back pain. Pt has several months of chronic lumbosacral back pain, evaluated by outpatient ortho and referred for physical therapy and pain management. She has been receiving ?vitamin spinal injections. She last received low back inj on 12/16 after which she her low back pain worsened. She took oxycodone 5 mg qd (prescribed in september during a ED visit) and acetaminophen for past three days without relief. She also endorses worsening pain during ambulation with walker and has affected her quality of life. She has constipation, last BM 4 days ago. Otherwise denies fever, chills, N/V, abdominal pain, dysuria, urinary retention, fecal incontinence, saddle anesthesia, fall, LOC, trauma. During ROS she endorsed LLE weakness for several months, no numbness or tingling and has LLE edema >RLE edema, reported undergoing LLE US 4 months ago, was negative for DVT. Currently has 8/10 pain, improved to 5/10 with oxycodone 5 mg x 2 in ED. (28 Dec 2020 11:16)    reports 8/10 pain. Reports no bm yet, tried suppository overnight.  Denies n/v.    REVIEW OF SYSTEMS  Constitutional - No fever, No weight loss, No fatigue  HEENT - No eye pain, No visual disturbances, No difficulty hearing, No tinnitus, No vertigo, No neck pain  Respiratory - No cough, No wheezing, No shortness of breath  Cardiovascular - No chest pain, No palpitations  Gastrointestinal - No abdominal pain, No nausea, No vomiting, No diarrhea, +constipation  Genitourinary - No dysuria, No frequency, No hematuria, No incontinence  Neurological - No headaches, No memory loss, +loss of strength, No numbness, No tremors  Skin - s/p surgery   Endocrine - No temperature intolerance  Musculoskeletal - + pain  Psychiatric - No depression, No anxiety    PAST MEDICAL & SURGICAL HISTORY  HTN (hypertension)    DVT (deep venous thrombosis)    Hypothyroidism    RA (rheumatoid arthritis)    Obesity, Class II, BMI 35-39.9, no comorbidity    Hypothyroid    Benign heart murmur    Hyperlipidemia    Hypertension    H/O degenerative disc disease    Osteoarthritis    SLE (systemic lupus erythematosus)    Rheumatoid arthritis    S/P knee replacement    Status post total hip replacement, right    S/P thyroid surgery    Lung cancer    S/P knee surgery    S/P carpal tunnel release    S/P eye surgery    S/P cataract surgery    S/P tonsillectomy      SOCIAL HISTORY  Smoking - Denied  EtOH - Denied   Drugs - Denied    FUNCTIONAL HISTORY  Lives alone in 1 story house  Independent with RW at baseline    CURRENT FUNCTIONAL STATUS  BM: max of 2  T: 10 steps with RW and mod of 2      FAMILY HISTORY   No pertinent family history in first degree relatives  FHx: rheumatoid arthritis        RECENT LABS/IMAGING  none new today      VITALS  T(C): 36.9 (01-08-21 @ 06:07), Max: 37.1 (01-07-21 @ 18:02)  HR: 97 (01-08-21 @ 06:07) (68 - 97)  BP: 128/76 (01-08-21 @ 06:07) (108/45 - 140/87)  RR: 19 (01-08-21 @ 06:07) (18 - 20)  SpO2: 97% (01-08-21 @ 06:07) (95% - 98%)  Wt(kg): --    ALLERGIES  No Known Allergies      MEDICATIONS   atorvastatin 20 milliGRAM(s) Oral at bedtime  diphenhydrAMINE 25 milliGRAM(s) Oral at bedtime PRN  enoxaparin Injectable 40 milliGRAM(s) SubCutaneous two times a day  folic acid 1 milliGRAM(s) Oral daily  HYDROmorphone  Injectable 0.5 milliGRAM(s) IV Push every 3 hours PRN  HYDROmorphone  Injectable 0.25 milliGRAM(s) IV Push every 3 hours PRN  hydroxychloroquine 200 milliGRAM(s) Oral daily  influenza  Vaccine (HIGH DOSE) 0.7 milliLiter(s) IntraMuscular once  levothyroxine 200 MICROGram(s) Oral daily  melatonin 3 milliGRAM(s) Oral at bedtime PRN  oxyCODONE    IR 10 milliGRAM(s) Oral every 3 hours PRN  oxyCODONE    IR 5 milliGRAM(s) Oral every 3 hours PRN  polyethylene glycol 3350 17 Gram(s) Oral daily  senna 2 Tablet(s) Oral at bedtime      ----------------------------------------------------------------------------------------   PHYSICAL EXAM  Constitutional - NAD   HEENT - NCAT, EOMI   Chest - no respiratory distress  Cardiovascular - RRR, S1S2   Abdomen -   Soft, NTND  Extremities - healed L knee incision, No calf tenderness   Neurologic Exam -                    Cognitive - Awake, Alert, AAO to self, place, date, year, situation     Motor -                      LEFT    UE - ShAB 5/5, EF 5/5, EE 5/5, WE 5/5,  5/5                    RIGHT UE - ShAB 5/5, EF 5/5, EE 5/5, WE 5/5,  5/5                    LEFT    LE - 4/5                    RIGHT LE - 4+/5     Sensory - Intact to LT          Balance - WNL Static  Psychiatric - Mood stable, Affect WNL  ----------------------------------------------------------------------------------------  ASSESSMENT/PLAN  75 yo female stable S/P Posterior T10-L5 instrumented fusion, L1-L5 laminectomies, L L4-L5 foraminotomy  constipation: pt reports last bm 1 week ago, tried suppository but did not have bm yet, consider enema or repeat suppository  Pain -dilaudid iv prn, oxy ir prn, with bowel regimen  Diet: regular  continue bedside PT and OT  dvt ppx: lovenox  turn and position q 2 to prevent skin breakdown  Rehab -    Recommend ACUTE inpatient rehabilitation for the functional deficits consisting of 3 hours of therapy/day & 24 hour RN/daily PMR physician for comorbid medical management. Will continue to follow for ongoing rehab needs and recommendations.     patient must be off iv pain medications 24 hours prior to rehab     surgery  Neurosurgery, Dr. Nguyen    Code Status  DNR/DNI    Disposition  Anticipate SNF need at discharge.  Transfer to Med/Onc.      Review of Systems  Review of Systems   Constitutional: Negative for fever.   HENT: Negative for ear pain.    Eyes: Negative for blurred vision.   Respiratory: Negative for shortness of breath.    Cardiovascular: Negative for chest pain.   Gastrointestinal: Positive for constipation. Negative for abdominal pain, blood in stool, diarrhea, melena and vomiting.   Genitourinary: Negative for dysuria, hematuria and urgency.   Musculoskeletal: Negative for myalgias.   Skin: Negative for rash.   Neurological: Negative for dizziness and headaches.   Endo/Heme/Allergies: Negative.    Psychiatric/Behavioral: Negative.    All other systems reviewed and are negative.       Physical Exam  Temp:  [35.8 °C (96.5 °F)-36.9 °C (98.4 °F)] 36.9 °C (98.4 °F)  Pulse:  [77-88] 82  Resp:  [14-16] 14  BP: (129-155)/(74-81) 133/74  SpO2:  [94 %-96 %] 94 %    I performed the physical exam today, 06/08/19, and compared to yesterday, 06/07/2019, no changes except for that noted below:  Physical Exam   Constitutional: She is oriented to person, place, and time. She appears well-developed and well-nourished. No distress.   HENT:   Head: Normocephalic and atraumatic.   Eyes: Conjunctivae are normal. No scleral icterus.   Neck: Neck supple.   Cardiovascular: Normal rate, regular rhythm and normal heart sounds.  Exam reveals no gallop and no friction rub.    No murmur heard.  Pulmonary/Chest: Effort normal and breath sounds normal. No respiratory distress. She has no wheezes. She has no rales.   Abdominal: Soft. Bowel sounds are normal. She exhibits no distension and no mass. There is no tenderness. There is no rebound and no guarding.   Musculoskeletal: She exhibits tenderness. She exhibits no edema.   Mild tenderness right hip   Neurological: She is alert and oriented to person, place, and time.   Skin: Skin is warm  and dry. She is not diaphoretic.   Psychiatric: She has a normal mood and affect. Her behavior is normal. Thought content normal.   Nursing note and vitals reviewed.      Fluids    Intake/Output Summary (Last 24 hours) at 06/09/19 1617  Last data filed at 06/09/19 0600   Gross per 24 hour   Intake              830 ml   Output                0 ml   Net              830 ml       Laboratory  Recent Labs      06/07/19 0055 06/08/19 0055 06/09/19 0057   WBC  6.1  4.9  5.0   RBC  3.71*  3.74*  3.81*   HEMOGLOBIN  10.4*  10.8*  10.7*   HEMATOCRIT  31.6*  32.6*  32.5*   MCV  85.2  87.2  85.3   MCH  28.0  28.9  28.1   MCHC  32.9*  33.1*  32.9*   RDW  55.4*  57.2*  55.8*   PLATELETCT  177  206  214   MPV  10.5  10.3  9.6     Recent Labs      06/07/19 0055 06/08/19 0055 06/09/19 0057   SODIUM  130*  135  135   POTASSIUM  2.9*  3.5*  3.6   CHLORIDE  99  102  103   CO2  24  23  25   GLUCOSE  127*  92  79   BUN  14  10  7*   CREATININE  0.39*  0.40*  0.38*   CALCIUM  7.4*  7.3*  7.4*                   Imaging  CT-PELVIS WITH   Final Result      1.  Residual or recurrent abscess within the right iliacus muscle measuring 2.5 x 1.8 cm. It slightly smaller than on 5/28/2019      2.  Interval removal of hardware from the iliac bones and sacrum      3.  Lytic change of the right iliac bone and the sacrum. This could be related to hardware versus osteomyelitis.      4.  Subacute fractures of the right ilium, sacrum, and there is lucency within the left iliac bone that is likely from hardware      MR-BRAIN-WITH & W/O   Final Result      1.  Interval progression of supra and infratentorial intra-axial nodules and associated edema. This short-term interval progression favors infection over neoplasm though both remain considerations.   2.  Mild atrophy      DX-PORTABLE FLUOROSCOPY < 1 HOUR   Final Result         Portable fluoroscopy utilized for 2 minutes.      DX-PELVIS-1 OR 2 VIEWS   Final Result      Status post hardware  removal from the right SI joint      DX-CHEST-PORTABLE (1 VIEW)   Final Result      Interstitial prominence could be due to pulmonary edema or hypoventilatory change.      DX-CHEST-LIMITED (1 VIEW)   Final Result      LEFT basilar underinflation atelectasis or pneumonia      CT-DRAIN-HEMATOMA - SEROMA   Final Result      CT-guided RIGHT pelvic fluid collection aspiration, laboratory evaluation pending              Assessment/Plan    Diarrhea:  Now constipated.  Ruled out for C. difficile.  Supportive care.  IV fluid.  Imodium as needed.  Bowel regimen.    Brain lesions: not amenable to biopsy and is favor infectious etiology per neurosurgery.  Also recommend to continue antibiotic and repeat brain MRI in 2 weeks (~6/21).  Dr. Junior sent labs for fungal work up.  Fluconazole 800mg daily started.      * Abscess of right iliac muscle and right gluteus medius muscle- (present on admission)   Assessment & Plan    Recurrent issue since sacral fx repair in December 2018. Has had multiple IR drainage and completed abx.  CT guided abscess drainage on 5/30; 2 mL removed and cultured but negative and therefore abx stopped.  Then 4 days later became febrile, so cefepime and vamc started.  Been on them since.  Right hip hardware removal on 6/5.      Review both images and report of CT pelvis today with residual 2.5 x 1.8 cm fluid collection in the right iliac muscle, improved compared to prior.      IVF for renal protection     Sepsis (HCC)   Assessment & Plan    See above for source.  Resolved.     Hypomagnesemia- (present on admission)   Assessment & Plan    Improved.  Monitor intermittently      RLS (restless legs syndrome)- (present on admission)   Assessment & Plan    Restarted home dose of pramipexole.  Tolerating well     Chronic pain- (present on admission)   Assessment & Plan    Start acetaminophen 1000 mg p.o. twice daily.  Expect to help with PT OT treatments..  Continue oxycodone for breakthrough pain as needed.      History of DVT (deep vein thrombosis)- (present on admission)   Assessment & Plan    Previously on Xarelto prior to admission. Continue Lovenox 40 mg subcutaneous daily per Ortho rec.  We will switch to full dose Xarelto (6/2 last dose) when cleared by Ortho.     Hypercalcemia- (present on admission)   Assessment & Plan    Chronic. Continue sensipar.      Essential hypertension- (present on admission)   Assessment & Plan    Continues to be normotensive   - Continue home losartan, metoprolol, & amlodipine.      Chronic hyponatremia- (present on admission)   Assessment & Plan    Last sodium level 135.  Continue to monitor.  1/2 normal saline.     History of breast cancer- (present on admission)   Assessment & Plan    S/p left mastectomy and breast implant  -Outpatient follow-up     COPD (chronic obstructive pulmonary disease) (HCC)- (present on admission)   Assessment & Plan    Stable. Doing well on room air. No respiratory distress/SOB. No evidence of exacerbation but will continue to monitor.         Hypokalemia: Order 40 mEq of K-Dur x1.    VTE prophylaxis: Lovenox

## 2021-04-07 NOTE — PROGRESS NOTES
"Received report and assumed patient care at change of shift. Patient is resting in bed, A&Ox2. Pt stated she does not know the year but its \"nineteen-ninety something\".  Patient reports no pain or nausea at this time. Plan of care discussed, questions answered. Bed is in the lowest position and locked, call light within reach, non-skid socks in place, hourly rounding. Patient reports no further needs and this time.   " TELEMETRY

## 2021-05-28 NOTE — PROGRESS NOTES
68 years old female with past history of breast cancer initially admitted to outside facility with shortness of breath and hip pain.  CT scan of the hip show possible infection versus orthostasis  She also developed fever with temperature 104 and empirically started on IV Zosyn  She had been confused over the past few days and MRI of the brain was done and found to have metastasis brain lesions  Dr. Black was consulted before transfer  Blood culture is negative at that facility   Name band;

## 2021-07-26 NOTE — PROGRESS NOTES
Infectious Disease Progress Note    Author: Geno Junior M.D. Date & Time of service: 6/6/2019  1:36 PM    Chief Complaint:  Brain abscess, iliopsoas abscess, leukopenia        Interval History:  68 y.o. female admitted 5/29/2019 as a transfer from [facility].  She has bben there since 4/30/2019. + diabetes, SANTOSH of right hip with hardware, and breast cancer who was originally admitted to [facility] on 03/08/2019 for right hip and pelvic pain.  Work-up revealed a right iliopsoas lesion, gluteal abscess, and mult brain abscesses  5/6 Interval 24 hours of Vitals/Labs/Micro,and imaging results reviewed as available. See assessment.   5/10 AF WBC 3 continued c/o constipation denies HA, visual changes, neuro deficits  5/11 AF WBC 8.8 isolation stopped due to resolution neutropenia-sleepy today  5/12 AF no CBC somnolent-no acute events noted  5/13 AF WBC 6.2 c/o pain left hip today, unchanged Worse with movement and improved with ice. Refused PT yesterday   5/14 AF c/o dizzyness whenever she tirns on her side-no new HA or visual changes-still havng hip pain  5/15 AF sleeping at time of rounds-by report ambulating  5/16 AF weightbearing continues to improve-ambulating more.  No new complaints  5/20- still has some pain in the hip but ambulating without any issues. No fevers. In good spirits.  5/22/2019 continues to have hip pain.  No fevers have been noted  5/24 AF no CBC plan for CHAS today. Denies any headaches. States she is ambulating  5/25 afebrile CBC not done today.  Patient CHAS was negative.  She has a poor appetite and is requesting an appetite stimulant.  Ongoing left hip pain.  Plan for CT-guided drainage tomorrow  5/28 afebrile WBC 4.5.  Patient is resting comfortably.  She states that she has right breast pain.  Ultrasound of the breast otherwise unremarkable.  She is awaiting CT-guided drainage which has been delayed as CT scan malfunctioning yesterday.  5/29 afebrile, no CBC.  Patient denies any right  hip pain.  Continues to have left-sided hip pain especially with sitting. She had a repeat CT of the pelvis yesterday that revealed no changes in the abscess.  Per report, abscesses are too small for IR drainage given location and recommendation to transfer the patient to [facility] for CT-guided aspiration of the fluid for culture.  She continues to deny any fevers or chills.   AF c/o hungry and right hip pain Awaiting procedure. Denies SE abx  2019-no fevers.  Complains of pain in the hips.  Underwent aspiration of the pelvic abscess.  The cultures are negative  2019-no fevers.  Continues to complain of significant pain in her hips.  Pain medications are being adjusted.  2019-no fevers.  Continues to remain off the antibiotics.  Awaiting Ortho evaluation  2019 patient febrile up to 103.  Having shaking chills.  Is not feeling well.  Denies any specific complaints.  Labs Reviewed, Medications Reviewed, Radiology Reviewed and Wound Reviewed.   Interval 24 hours of Vitals/Labs/Micro,and imaging results reviewed as available. See assessment.     Review of Systems:  ROS    Hemodynamics:  Temp (24hrs), Av.7 °C (98.1 °F), Min:36.6 °C (97.8 °F), Max:37.1 °C (98.7 °F)  Temperature: 36.6 °C (97.8 °F)  Pulse  Av.5  Min: 60  Max: 125  Blood Pressure : 121/64       Physical Exam:  Physical Exam    Meds:    Current Facility-Administered Medications:   •  vancomycin  •  tramadol  •  insulin regular **AND** Accu-Chek ACHS **AND** NOTIFY MD and PharmD **AND** glucose **AND** dextrose 10% bolus  •  MD Alert...Vancomycin per Pharmacy  •  cefepime  •  NS  •  pramipexole  •  lidocaine  •  oxyCODONE immediate-release  •  temazepam  •  morphine injection  •  cyclobenzaprine  •  Respiratory Care per Protocol  •  amLODIPine  •  cinacalcet  •  losartan  •  acetaminophen  •  atorvastatin  •  omeprazole  •  metoprolol  •  senna-docusate **AND** polyethylene glycol/lytes **AND** magnesium hydroxide  **AND** bisacodyl  •  ondansetron  •  ondansetron    Labs:  Recent Labs      06/05/19   0348   WBC  13.7*   RBC  4.31   HEMOGLOBIN  12.3   HEMATOCRIT  37.0   MCV  85.8   MCH  28.5   RDW  59.2*   PLATELETCT  212   MPV  9.9     Recent Labs      06/05/19   0348   SODIUM  135   POTASSIUM  3.5*   CHLORIDE  101   CO2  23   GLUCOSE  77   BUN  14     Recent Labs      06/05/19   0348   CREATININE  0.55       Imaging:  Ct-drain-hematoma - Seroma    Result Date: 5/30/2019  HISTORY/REASON FOR EXAM:  68-year-old woman with multiple fluid collections adjacent to a chronic RIGHT iliac fracture site TECHNIQUE/EXAM DESCRIPTION: CT-guided RIGHT pelvic fluid collection aspiration. Low dose optimization technique was utilized for this CT exam including automated exposure control and adjustment of the mA and/or kV according to patient size. COMPARISON: CT 4/4/2019 MEDICATIONS: Moderate sedation was achieved with administration of 2 mg versed IV and 100 micrograms fentanyl IV. Sedation was administered for a total of 30 minutes and cardiorespiratory function was monitored by trained nursing staff throughout. PROCEDURE: The risks, benefits, goals and objectives and alternatives were discussed. Risks were specified as including but not limited to bleeding, infection, damage to vessels or nerves, pain and discomfort as well as nondiagnosis. The patient's questions were answered. Informed oral and written consent were obtained. The patient was appropriately positioned on the CT gantry. Initial CT imaging was performed and a site for percutaneously accessing the target lesion was selected and marked. The skin was prepped and draped in the usual sterile manner. A timeout was performed. Local anesthetic result was achieved with administration of1% lidocaine. Using CT fluoroscopic guidance a 17-gauge guiding needle was advanced to the target location. 2 mL of thick purulent fluid was aspirated. The needle was removed. Completion scanning was  Body Location Override (Optional - Billing Will Still Be Based On Selected Body Map Location If Applicable): Right superior helix performed. The patient tolerated the procedure well with no evidence of complication.  The skin was cleaned and a dressing was applied. FINDINGS: Appropriate needle position in the most cephalad collection. No hematoma at conclusion.     CT-guided RIGHT pelvic fluid collection aspiration, laboratory evaluation pending     Ct-pelvis With    Result Date: 5/28/2019 5/28/2019 12:54 PM HISTORY/REASON FOR EXAM: Follow-up abscess TECHNIQUE/EXAM DESCRIPTION AND NUMBER OF VIEWS: CT scan of the pelvis with contrast. Contrast-enhanced helical scanning of the pelvis was obtained from the iliac crests through the pubic symphysis following the bolus administration of 100 mL of Omnipaque 350 nonionic contrast without complication. Low dose optimization technique was utilized for this CT exam including automated exposure control and adjustment of the mA and/or kV according to patient size. COMPARISON: MRI scan of the pelvis 5/20/2019 and CT chest abdomen and pelvis 4/23/2019 FINDINGS: Again redemonstrated is a 3.2 x 2 cm multiloculated low-attenuation collection in the right iliacus muscle unchanged from recent MRI scan of the pelvis. There is also a 2.8 x 1.7 cm low-attenuation collection in the right gluteus medius muscle. This collection is also unchanged from recent MRI scan of the pelvis. Previous fixation screws are noted coursing through the right lateral ilium across the sacrum extending into the left lateral ilium. There is a healing fracture of the right posterior ilium. There is no evidence of free fluid in the pelvis or pelvic mass. There are scattered air-fluid levels throughout the small bowel     1.  Stable abscesses again redemonstrated in the right iliac's muscle and right gluteus medius muscle. 2.  Status post fixation screws coursing through the jose antonio and sacrum for treatment of healing fracture in the right posterior ilium. 3.  Adynamic ileus.    Dx-chest-limited (1 View)    Result Date: 6/4/2019 6/4/2019 2:53  PM HISTORY/REASON FOR EXAM:  Fever TECHNIQUE/EXAM DESCRIPTION AND NUMBER OF VIEWS: Single portable view of the chest. COMPARISON: 5/21/2019 FINDINGS: RIGHT upper extremity PICC is in unchanged position. LEFT axillary surgical clips are redemonstrated. HEART: Stable size. LUNGS: Lung volumes are low. There is patchy LEFT basilar opacity. PLEURA: No effusion or pneumothorax.     LEFT basilar underinflation atelectasis or pneumonia    Dx-chest-portable (1 View)    Result Date: 6/4/2019 6/4/2019 8:01 PM HISTORY/REASON FOR EXAM:  Fever. History of cough TECHNIQUE/EXAM DESCRIPTION AND NUMBER OF VIEWS: Single portable view of the chest. COMPARISON: June 4, 2019 3:20 PM FINDINGS: Right-sided PICC line remains in place. There is interstitial prominence. No effusion or pneumothorax identified. There are bilateral breast implants.     Interstitial prominence could be due to pulmonary edema or hypoventilatory change.    Dx-chest-portable (1 View)    Result Date: 5/21/2019 5/21/2019 2:30 PM HISTORY/REASON FOR EXAM:  Right-sided neck pain. TECHNIQUE/EXAM DESCRIPTION AND NUMBER OF VIEWS: Single portable view of the chest. COMPARISON: 4/30/2019 FINDINGS: Single portable view of the chest demonstrates a normal cardiac silhouette and mediastinal contours. Calcification is in the aortic knob. No discrete opacity, pleural fluid, or pneumothorax. A right PICC line remains in place. The tip appears to be located at the cavoatrial junction.     1.  No acute cardiopulmonary findings. 2.  Right PICC line place with the tip projecting over the cavoatrial junction    Dx-pelvis-1 Or 2 Views    Result Date: 6/5/2019 6/5/2019 7:30 AM HISTORY/REASON FOR EXAM:  Main OR. Hardware removal of right SI joint TECHNIQUE/EXAM DESCRIPTION AND NUMBER OF VIEWS:  1 view(s) of the pelvis. COMPARISON:  None. FINDINGS: Single fluoroscopic image obtained during hardware removal the right SI joint. Fluoroscopy time: 2 minutes     Status post hardware removal  Patient Name (Optional- Will Render 'the Patient' If Blank): Lyle Mohs Case Number: [id number] from the right SI joint    Mr-brain-with & W/o    Result Date: 5/21/2019 5/20/2019 3:37 PM HISTORY/REASON FOR EXAM:  Follow-up brain abscesses. TECHNIQUE/EXAM DESCRIPTION:   MRI of the brain without and with contrast. T1 sagittal, T2 fast spin-echo axial, T1 coronal, FLAIR coronal, diffusion-weighted and apparent diffusion coefficient (ADC map) axial images were obtained of the whole brain. T1 postcontrast axial and T1 postcontrast coronal images were obtained. The study was performed on a [employer] Signa 1.5 Miranda MRI scanner. 6 mL Gadavist contrast was administered intravenously. COMPARISON:  MRI scan of the brain 4/24/2019 FINDINGS: There are innumerable small ovoid enhancing foci throughout the brain parenchyma, both in the supratentorial and infratentorial compartments. The number of these lesions has increased significantly since previous exam. The previously identified largest lesion in the right brachium pontis has resolved. There is evidence of increased T2 signal intensity in many of these lesions and there is surrounding increased T2 signal intensity in several lesions in the right superior frontal lobe. The calvariae are unremarkable. There are no extra-axial fluid collections. The ventricular system and basal cisterns are mild to moderately prominent. There is mild-to-moderate prominence of the cortical sulci and gyri. There are no mass effects or shift of midline structures. There are no hemorrhagic lesions. The diffusion-weighted axial images show no evidence of acute cerebral infarction. The brainstem and posterior fossa structures are unremarkable. Vascular flow voids in the vertebrobasilar and carotid arteries, [place], and dural venous sinuses are intact. The paranasal sinuses and mastoids in the field of view are unremarkable.     1.  Increase in number of innumerable small ovoid enhancing lesions throughout the brain parenchyma. These lesions may represent microabscesses of either bacterial  Date Of Previous Biopsy (Optional): 6/24/21 or fungal origin or possibly brain metastases. 2.  Age-related cerebral atrophy.    Mr-pelvis-with & W/o And Sequences    Result Date: 5/21/2019 5/20/2019 3:37 PM HISTORY/REASON FOR EXAM:  Abd pain, fever, abscess suspected; evaluate abscesses TECHNIQUE/EXAM DESCRIPTION: MRI of the pelvis without and with contrast. The study was performed on a [employer] Signa 1.5 Miranda MRI scanner. T1 axial, T1 coronal, T2 fast spin-echo fat-suppressed axial, and T2 fast spin-echo fat-suppressed coronal images were obtained of the pelvis. Postcontrast fat-suppressed intravenous gadolinium enhanced sequences in the axial and coronal planes were then  performed. 6 mL Gadavist contrast was administered intravenously. COMPARISON: 3/26/2019. CT 4/23/2019 FINDINGS: Interval decrease in size of the collection in the right gluteal medius muscle, measuring 1.4 x 1.9 cm, previously 2.3 x 2.8 cm. There is minimal surrounding stranding. Mild heterogeneity and stranding in the overlying right gluteus cornelio muscle without discrete collection. Grossly unchanged in size of the multiloculated collection in the right iliacus muscle, measuring 2.4 x 3.5 cm. Postsurgical change in the sacrum with susceptibility artifact from the screws. Moderate osteoarthritis of the bilateral hip joints.     1. Interval decrease in size of the collection in the right gluteal medius muscle, measuring 1.4 x 1.9 cm, previously 2.3 x 2.8 cm. There is minimal surrounding stranding. Mild heterogeneity and stranding in the overlying right gluteus cornelio muscle without discrete collection, could relate to reactive change or phlegmon. 2. Grossly unchanged in size of the multiloculated collection in the right iliacus muscle, measuring 2.4 x 3.5 cm.    Us-chest    Result Date: 5/27/2019 5/27/2019 10:23 AM HISTORY/REASON FOR EXAM:  Right lateral breast pain, evaluate implant TECHNIQUE/EXAM DESCRIPTION AND NUMBER OF VIEWS: Targeted ultrasound of the right breast. COMPARISON: None  FINDINGS: No gross mass or implant rupture are identified. Diagnostic assessment is not feasible lack of proper equipment tube performed breast imaging     1.  Nondiagnostic assessment of the right breast without gross implant rupture identified 2.  Recommend further assessment with mammography and ultrasound at an accredited breast facility    Dx-portable Fluoroscopy < 1 Hour    Result Date: 2019 7:30 AM HISTORY/REASON FOR EXAM:  Right S1 screws removal, Main OR TECHNIQUE/EXAM DESCRIPTION AND NUMBER OF VIEWS: Portable fluoroscopy for less than one hour FINDINGS:      The portable fluoroscopy unit was obligated to the procedure for less than one hour.   Actual fluoro time was 2 minutes.     Portable fluoroscopy utilized for 2 minutes.    Ec-halie W/o Cont    Result Date: 2019  Transesophageal Echo Report Echocardiography Laboratory CONCLUSIONS Normal esophageal echocardiogram. No evidence of endocarditis. No significant change since the prior study of 3/15/2019. MARLENA CLIFFORD Exam Date:          2019                     12:48 Exam Location:      Inpatient Priority:            Routine Ordering Physician:        DARON WHELAN Referring Physician: Sonographer:               CATALINO Rivers Age:    68     Gender:    F MRN:    [id number] :    [id number] BSA:           Ht (in):           Wt (lb): Report Type:      Complete Indications:     Endocarditis and heart valve disorders in diseases                  classified elsewhere ICD Codes:       I39 CPT Codes:       54529 BP:          /          HR: Technical Quality:       Fair MEASUREMENTS  (Male / Female) Normal Values 2D ECHO Estimated LV Ejection Fraction    65 %                  * Indicates values subject to auto-interpretation LV EF:  65    % Medications Medications determined by anesthesiologist. Limitations none Complications none Proc. Components The probe was inserted  Previous Accession (Optional): [id number] Biopsy Photograph Reviewed: Yes and manipulated by Dr Potter. Probe #SM  was used for this procedure. 2D, color Doppler, spectral Doppler, and 3D imaging were used as part of the evaluation as clinically indicated. FINDINGS Left Ventricle The left ventricle was normal in size and function. Right Ventricle The right ventricle was normal in size and function. Right Atrium The right atrium is normal in size. Left Atrium The left atrium is normal in size. LA Appendage Normal left atrial appendage. IA Septum The interatrial septum is normal. IV Septum The interventricular septum is normal. Mitral Valve Structurally normal mitral valve without significant stenosis or regurgitation.  No valvular vegetations. Aortic Valve Structurally normal aortic valve without significant stenosis or regurgitation.  No valvular vegetations. Tricuspid Valve Structurally normal tricuspid valve without significant stenosis or regurgitation.  No valvular vegetations. Pulmonic Valve Structurally normal pulmonic valve without significant stenosis or regurgitation.  No valvular vegetations. Pericardium Normal pericardium without effusion. Aorta The aortic root is normal. Christopher Potter MD (Electronically Signed) Final Date:     24 May 2019 15:42      Micro:  Results     Procedure Component Value Units Date/Time    C Diff by PCR rflx Toxin [[id number]] Collected:  06/06/19 0900    Order Status:  Completed Specimen:  Stool from Stool Updated:  06/06/19 0945    Narrative:       Special Contact [id number] MERYL PICKENS  Does this patient have risk factors for C-diff?->Yes    BLOOD CULTURE [[id number]] Collected:  06/04/19 1512    Order Status:  Completed Specimen:  Blood from Peripheral Updated:  06/05/19 0855     Significant Indicator NEG     Source BLD     Site PERIPHERAL     Culture Result No Growth  Note: Blood cultures are incubated for 5 days and  are monitored continuously.Positive blood cultures  are called to the RN and reported as soon as  they are  "identified.      Narrative:       Per Hospital Policy: Only change Specimen Src: to \"Line\" if  specified by physician order.  Left Forearm/Arm    BLOOD CULTURE [[id number]] Collected:  06/04/19 1512    Order Status:  Completed Specimen:  Blood from Peripheral Updated:  06/05/19 0855     Significant Indicator NEG     Source BLD     Site PERIPHERAL     Culture Result No Growth  Note: Blood cultures are incubated for 5 days and  are monitored continuously.Positive blood cultures  are called to the RN and reported as soon as  they are identified.      Narrative:       Per Hospital Policy: Only change Specimen Src: to \"Line\" if  specified by physician order.  Left Hand    Culture Respiratory W/ GRM STN [[id number]]     Order Status:  Completed Specimen:  Respirate from Sputum     URINALYSIS [[id number]]     Order Status:  No result Specimen:  Urine     BLOOD CULTURE [[id number]] Collected:  06/04/19 0000    Order Status:  Canceled Specimen:  Other from Peripheral     BLOOD CULTURE [[id number]] Collected:  06/04/19 0000    Order Status:  Canceled Specimen:  Other from Peripheral     FLUID CULTURE W/GRAM STAIN [[id number]] Collected:  05/30/19 1339    Order Status:  Completed Specimen:  Body Fluid Updated:  06/03/19 0726     Significant Indicator NEG     Source BF     Site pelvic abcess     Culture Result No growth at 4 days.     Gram Stain Result Few WBCs.  No organisms seen.      GRAM STAIN [[id number]] Collected:  05/30/19 1339    Order Status:  Completed Specimen:  Body Fluid Updated:  05/30/19 1832     Significant Indicator .     Source BF     Site pelvic abcess     Gram Stain Result Few WBCs.  No organisms seen.      FLUID CULTURE W/GRAM STAIN [[id number]]     Order Status:  No result Specimen:  Body Fluid from Other Body Fluid           Assessment:  Active Hospital Problems    Diagnosis   • *Abscess of right iliac muscle and right gluteus medius muscle [L02.91]   • Sepsis (HCC) [A41.9]   • Hypomagnesemia [E83.42]   • " Referring Physician (Optional): Dr. Edu Garcia "Chronic pain [G89.29]   • RLS (restless legs syndrome) [G25.81]   • Hypercalcemia [E83.52]   • Essential hypertension [I10]   • Chronic hyponatremia [E87.1]   • History of breast cancer [Z85.3]   • COPD (chronic obstructive pulmonary disease) (HCC) [J44.9]   • History of DVT (deep vein thrombosis) [Z86.718]     Assessment:  Gluteal and iliopsoas abscess  Brain abscesses vs mets  Breast cancer  DM     Interval 24 hour assessment:    Events, OR for hardware removal  AF, O2 0.5 NC,    Labs reviewed  Imaging personally reviewed both images and report.   Micro reviewed    Pt continued on cefepime and vancomycin.  She is tolerated the surgery well.  She is reporting some pain in her hips.  She is denying any headache and appears to be mentating well.     Plan:     New fevers. 101 on 6/4 -now improved  Uncertain etiology, may be related to known abscesses, see below or central fever secondary to potential brain mets/abscesses  Panculture again-blood cultures on 6/4 with no growth to date  Pull the PICC line  Went to the OR as below on 6/5    Leukocytosis, new  -  likely secondary to surgery yesterday     Gluteal and iliopsoas abscess s/p treatment.  Additional work-up ongoing  Adjacent to hardware in right hip (placed 12/17/18)  CT 4/23 \"Fluid collections within the right gluteal and iliacis muscles.. The collection within the right gluteal muscle has unchanged while the fluid collection within the right iliacis muscle is increased somewhat in size.\" 2.7 cm  Cultures 3/29 and 4/4 neg  MRI on 5/20/2019 - interval decrease in collection in R gluteal muscle and persistent multiloculated collection in R iliacus muscle.   CT 5/28 no change  Underwent drainage on 5/30/2019-cultures are negative  ESR is 32 and CRP has come down to 0.98 from 3.53  OR on 6/6 with removal of hardware from R hip    --- Continue vancomycin and cefepime for now-if all cultures remain negative do not plan extended antibiotic course as she is Harsh had " Consent Type: Consent 1 (Standard) "multiple weeks of IV antibiotics and the fevers may be related to her ongoing intracerebral process  --- Follow-up OR cultures     Brain abscesses vs mets  MRI 4/23 \"supra and infratentorial brain parenchyma... interval decrease in the size of the lesions. There are also interval reduction in the extent of the surrounding white matter edema in the restricted diffusion consistent with improvement/treatment response of the most of the multifocal brain abscesses.  Repeat MRI 5/21 showed innumerable microabscesses vs mets -this change occurred while she was still on broad-spectrum antibiotics indicating this is either not an infectious process or an infectious etiology that is not covered by either vancomycin cefepime or Flagyl  CHAS neg 3/15 and 5/24  No benefit of PET scan per notes  Abx (vancomycin, cefepime and Flagyl) discontinued on 5/23-developed new fevers on 6/4, uncertain etiology    --- Suspect her brain abscess may be metastasis, however initiated work-up for non-common bacterial causes-cryptococcus antigen, cocci, beta D glucan and galactomannan sent  --- We will monitor fever curve and consider repeat brain imaging-if we are able to biopsy a lesion this would be very helpful      History of pelvic fracture  Status post pin placement and sacral  Eventually will need removal     Type 2 DM  Hemoglobin A1c 6.3   Keep BS under 150 to help control current infection     Discussed with  at bedside.  ID will follow.       Geno Junior MD  Infectious Diseases       " Eye Shield Used: No Surgeon Performing Repair (Optional): Dr. Hanh Lozano Initial Size Of Lesion: 0.7 X Size Of Lesion In Cm (Optional): 0.4 Number Of Stages: 2 Primary Defect Length In Cm (Final Defect Size - Required For Flaps/Grafts): 1.2 Primary Defect Width In Cm (Final Defect Size - Required For Flaps/Grafts): 0.5 Repair Type: Intermediate Layered Repair Oculoplastic Surgeon Procedure Text (A): After obtaining clear surgical margins the patient was sent to oculoplastics for surgical repair.  The patient understands they will receive post-surgical care and follow-up from the referring physician's office. Oculoplastic Surgeon Procedure Text (B): After obtaining clear surgical margins the patient was sent to oculoplastics for surgical repair.  The patient understands they will receive post-surgical care and follow-up from the referring physician's office. Otolaryngologist Procedure Text (A): After obtaining clear surgical margins the patient was sent to otolaryngology for surgical repair.  The patient understands they will receive post-surgical care and follow-up from the referring physician's office. Otolaryngologist Procedure Text (B): After obtaining clear surgical margins the patient was sent to otolaryngology for surgical repair.  The patient understands they will receive post-surgical care and follow-up from the referring physician's office. Plastic Surgeon Procedure Text (A): After obtaining clear surgical margins the patient was sent to plastics for surgical repair.  The patient understands they will receive post-surgical care and follow-up from the referring physician's office. Plastic Surgeon Procedure Text (B): After obtaining clear surgical margins the patient was sent to plastics for surgical repair.  The patient understands they will receive post-surgical care and follow-up from the referring physician's office. Mid-Level Procedure Text (A): After obtaining clear surgical margins the patient was sent to a mid-level provider for surgical repair.  The patient understands they will receive post-surgical care and follow-up from the mid-level provider. Mid-Level Procedure Text (B): After obtaining clear surgical margins the patient was sent to a mid-level provider for surgical repair.  The patient understands they will receive post-surgical care and follow-up from the mid-level provider. Provider Procedure Text (A): After obtaining clear surgical margins the defect was repaired by another provider. Asc Procedure Text (A): After obtaining clear surgical margins the patient was sent to an ASC for surgical repair.  The patient understands they will receive post-surgical care and follow-up from the ASC physician. Asc Procedure Text (B): After obtaining clear surgical margins the patient was sent to an ASC for surgical repair.  The patient understands they will receive post-surgical care and follow-up from the ASC physician. Suturegard Retention Suture: 2-0 Nylon Retention Suture Bite Size: 3 mm Length To Time In Minutes Device Was In Place: 10 Number Of Hemigard Strips Per Side: 1 Undermining Type: Entire Wound Debridement Text: The wound edges were debrided prior to proceeding with the closure to facilitate wound healing. Helical Rim Text: The closure involved the helical rim. Vermilion Border Text: The closure involved the vermilion border. Nostril Rim Text: The closure involved the nostril rim. Retention Suture Text: Retention sutures were placed to support the closure and prevent dehiscence. Secondary Defect Length In Cm (Required For Flaps): 0 Location Indication Override (Is Already Calculated Based On Selected Body Location): Area H Area H Indication Text: Tumors in this location are included in Area H (eyelids, eyebrows, nose, lips, chin, ear, pre-auricular, post-auricular, temple, genitalia, hands, feet, ankles and areola).  Tissue conservation is critical in these anatomic locations. Area M Indication Text: Tumors in this location are included in Area M (cheek, forehead, scalp, neck, jawline and pretibial skin).  Mohs surgery is indicated for tumors in these anatomic locations. Area L Indication Text: Tumors in this location are included in Area L (trunk and extremities).  Mohs surgery is indicated for larger tumors, or tumors with aggressive histologic features, in these anatomic locations. Depth Of Tumor Invasion (For Histology): tumor not visualized Perineural Invasion (For Histology - Be Specific If Possible): absent Special Stains Stage 1 - Results: Base On Clearance Noted Above Histology Selection Override (Optional- Will Default To Parent Diagnosis If N/A): Squamous Cell Carcinoma Stage 2: Additional Anesthesia Volume In Cc: 1.0 Stage 2: Additional Anesthesia Type: 1% lidocaine with 1:100,000 epinephrine and 408mcg clindamycin/ml and a 1:10 solution of 8.4% sodium bicarbonate Stage 3: Additional Anesthesia Type: 1% lidocaine with 1:100,000 epinephrine and a 1:10 solution of 8.4% sodium bicarbonate Staging Info: By selecting yes to the question above you will include information on AJCC 8 tumor staging in your Mohs note. Information on tumor staging will be automatically added for SCCs on the head and neck. AJCC 8 includes tumor size, tumor depth, perineural involvement and bone invasion. Tumor Depth: Less than 6mm from granular layer and no invasion beyond the subcutaneous fat Was The Patient On Physician Recommended Anticoagulation Therapy?: Please Select the Appropriate Response Medical Necessity Statement: Based on my medical judgement, Mohs surgery is the most appropriate treatment for this cancer compared to other treatments. Alternatives Discussed Intro (Do Not Add Period): I discussed alternative treatments to Mohs surgery and specifically discussed the risks and benefits of Consent 1/Introductory Paragraph: The rationale for Mohs was explained to the patient and consent was obtained. The risks, benefits and alternatives to therapy were discussed in detail. Specifically, the risks of infection, scarring, bleeding, prolonged wound healing, incomplete removal, allergy to anesthesia, nerve injury and recurrence were addressed. Prior to the procedure, the treatment site was clearly identified and confirmed by the patient. All components of Universal Protocol/PAUSE Rule completed. Consent 2/Introductory Paragraph: Mohs surgery was explained to the patient and consent was obtained. The risks, benefits and alternatives to therapy were discussed in detail. Specifically, the risks of infection, scarring, bleeding, prolonged wound healing, incomplete removal, allergy to anesthesia, nerve injury and recurrence were addressed. Prior to the procedure, the treatment site was clearly identified and confirmed by the patient. All components of Universal Protocol/PAUSE Rule completed. Consent 3/Introductory Paragraph: I gave the patient a chance to ask questions they had about the procedure.  Following this I explained the Mohs procedure and consent was obtained. The risks, benefits and alternatives to therapy were discussed in detail. Specifically, the risks of infection, scarring, bleeding, prolonged wound healing, incomplete removal, allergy to anesthesia, nerve injury and recurrence were addressed. Prior to the procedure, the treatment site was clearly identified and confirmed by the patient. All components of Universal Protocol/PAUSE Rule completed. Consent (Temporal Branch)/Introductory Paragraph: The rationale for Mohs was explained to the patient and consent was obtained. The risks, benefits and alternatives to therapy were discussed in detail. Specifically, the risks of damage to the temporal branch of the facial nerve, infection, scarring, bleeding, prolonged wound healing, incomplete removal, allergy to anesthesia, and recurrence were addressed. Prior to the procedure, the treatment site was clearly identified and confirmed by the patient. All components of Universal Protocol/PAUSE Rule completed. Consent (Marginal Mandibular)/Introductory Paragraph: The rationale for Mohs was explained to the patient and consent was obtained. The risks, benefits and alternatives to therapy were discussed in detail. Specifically, the risks of damage to the marginal mandibular branch of the facial nerve, infection, scarring, bleeding, prolonged wound healing, incomplete removal, allergy to anesthesia, and recurrence were addressed. Prior to the procedure, the treatment site was clearly identified and confirmed by the patient. All components of Universal Protocol/PAUSE Rule completed. Consent (Spinal Accessory)/Introductory Paragraph: The rationale for Mohs was explained to the patient and consent was obtained. The risks, benefits and alternatives to therapy were discussed in detail. Specifically, the risks of damage to the spinal accessory nerve, infection, scarring, bleeding, prolonged wound healing, incomplete removal, allergy to anesthesia, and recurrence were addressed. Prior to the procedure, the treatment site was clearly identified and confirmed by the patient. All components of Universal Protocol/PAUSE Rule completed. Consent (Near Eyelid Margin)/Introductory Paragraph: The rationale for Mohs was explained to the patient and consent was obtained. The risks, benefits and alternatives to therapy were discussed in detail. Specifically, the risks of ectropion or eyelid deformity, infection, scarring, bleeding, prolonged wound healing, incomplete removal, allergy to anesthesia, nerve injury and recurrence were addressed. Prior to the procedure, the treatment site was clearly identified and confirmed by the patient. All components of Universal Protocol/PAUSE Rule completed. Consent (Ear)/Introductory Paragraph: The rationale for Mohs was explained to the patient and consent was obtained. The risks, benefits and alternatives to therapy were discussed in detail. Specifically, the risks of ear deformity, infection, scarring, bleeding, prolonged wound healing, incomplete removal, allergy to anesthesia, nerve injury and recurrence were addressed. Prior to the procedure, the treatment site was clearly identified and confirmed by the patient. All components of Universal Protocol/PAUSE Rule completed. Consent (Nose)/Introductory Paragraph: The rationale for Mohs was explained to the patient and consent was obtained. The risks, benefits and alternatives to therapy were discussed in detail. Specifically, the risks of nasal deformity, changes in the flow of air through the nose, infection, scarring, bleeding, prolonged wound healing, incomplete removal, allergy to anesthesia, nerve injury and recurrence were addressed. Prior to the procedure, the treatment site was clearly identified and confirmed by the patient. All components of Universal Protocol/PAUSE Rule completed. Consent (Lip)/Introductory Paragraph: The rationale for Mohs was explained to the patient and consent was obtained. The risks, benefits and alternatives to therapy were discussed in detail. Specifically, the risks of lip deformity, changes in the oral aperture, infection, scarring, bleeding, prolonged wound healing, incomplete removal, allergy to anesthesia, nerve injury and recurrence were addressed. Prior to the procedure, the treatment site was clearly identified and confirmed by the patient. All components of Universal Protocol/PAUSE Rule completed. Consent (Scalp)/Introductory Paragraph: The rationale for Mohs was explained to the patient and consent was obtained. The risks, benefits and alternatives to therapy were discussed in detail. Specifically, the risks of changes in hair growth pattern secondary to repair, infection, scarring, bleeding, prolonged wound healing, incomplete removal, allergy to anesthesia, nerve injury and recurrence were addressed. Prior to the procedure, the treatment site was clearly identified and confirmed by the patient. All components of Universal Protocol/PAUSE Rule completed. Detail Level: Detailed Postop Diagnosis: same Surgeon: Dr. Lozano Hemostasis: Electrodesiccation Estimated Blood Loss (Cc): minimal Stage 5: Additional Anesthesia Type: 1% lidocaine with epinephrine Repair Anesthesia Method: local infiltration Brow Lift Text: A midfrontal incision was made medially to the defect to allow access to the tissues just superior to the left eyebrow. Following careful dissection inferiorly in a supraperiosteal plane to the level of the left eyebrow, several 3-0 monocryl sutures were used to resuspend the eyebrow orbicularis oculi muscular unit to the superior frontal bone periosteum. This resulted in an appropriate reapproximation of static eyebrow symmetry and correction of the left brow ptosis. Deep Sutures: 5-0 Vicryl Epidermal Sutures: 5-0 Prolene Epidermal Closure: simple interrupted Suturegard Intro: Intraoperative tissue expansion was performed, utilizing the SUTUREGARD device, in order to reduce wound tension. Suturegard Body: The suture ends were repeatedly re-tightened and re-clamped to achieve the desired tissue expansion. Hemigard Intro: Due to skin fragility and wound tension, it was decided to use HEMIGARD adhesive retention suture devices to permit a linear closure. The skin was cleaned and dried for a 6cm distance away from the wound. Excessive hair, if present, was removed to allow for adhesion. Hemigard Postcare Instructions: The HEMIGARD strips are to remain completely dry for at least 5-7 days. Donor Site Anesthesia Type: same as repair anesthesia Graft Donor Site Bandage (Optional-Leave Blank If You Don't Want In Note): Steri-strips and a pressure bandage were applied to the donor site. Closure 2 Information: This tab is for additional flaps and grafts, including complex repair and grafts and complex repair and flaps. You can also specify a different location for the additional defect, if the location is the same you do not need to select a new one. We will insert the automated text for the repair you select below just as we do for solitary flaps and grafts. Please note that at this time if you select a location with a different insurance zone you will need to override the ICD10 and CPT if appropriate. Closure 3 Information: This tab is for additional flaps and grafts above and beyond our usual structured repairs.  Please note if you enter information here it will not currently bill and you will need to add the billing information manually. Closure 4 Information: This tab is for additional flaps and grafts above and beyond our usual structured repairs.  Please note if you enter information here it will not currently bill and you will need to add the billing information manually. Wound Care: Petrolatum Dressing: pressure dressing with telfa Dressing (No Sutures): dry sterile dressing Suture Removal: 7 days Unna Boot Text: An Unna boot was placed to help immobilize the limb and facilitate more rapid healing. Home Suture Removal Text: Patient was provided instructions on removing sutures and will remove their sutures at home.  If they have any questions or difficulties they will call the office. Post-Care Instructions: I reviewed with the patient in detail post-care instructions. Patient is not to engage in any heavy lifting, exercise, or swimming for the next 7 days. Should the patient develop any fevers, chills, bleeding, severe pain patient will contact the office immediately. Pain Refusal Text: I offered to prescribe pain medication but the patient refused to take this medication. Mauc Instructions: By selecting yes to the question below the MAUC number will be added into the note.  This will be calculated automatically based on the diagnosis chosen, the size entered, the body zone selected (H,M,L) and the specific indications you chose. You will also have the option to override the Mohs AUC if you disagree with the automatically calculated number and this option is found in the Case Summary tab. Where Do You Want The Question To Include Opioid Counseling Located?: Case Summary Tab Eye Protection Verbiage: Before proceeding with the stage, a plastic scleral shield was inserted. The globe was anesthetized with a few drops of 1% lidocaine with 1:100,000 epinephrine. Then, an appropriate sized scleral shield was chosen and coated with lacrilube ointment. The shield was gently inserted and left in place for the duration of each stage. After the stage was completed, the shield was gently removed. Mohs Method Verbiage: An incision at a 45 degree angle following the standard Mohs approach was done and the specimen was harvested as a microscopic controlled layer. Surgeon/Pathologist Verbiage (Will Incorporate Name Of Surgeon From Intro If Not Blank): operated in two distinct and integrated capacities as the surgeon and pathologist. Mohs Histo Method Verbiage: Each section was then chromacoded and processed in the Mohs lab using the Mohs protocol and submitted for frozen section. Subsequent Stages Histo Method Verbiage: Using a similar technique to that described above, a thin layer of tissue was removed from all areas where tumor was visible on the previous stage.  The tissue was again oriented, mapped, dyed, and processed as above. Mohs Rapid Report Verbiage: The area of clinically evident tumor was marked with skin marking ink and appropriately hatched.  The initial incision was made following the Mohs approach through the skin.  The specimen was taken to the lab, divided into the necessary number of pieces, chromacoded and processed according to the Mohs protocol.  This was repeated in successive stages until a tumor free defect was achieved. Complex Repair Preamble Text (Leave Blank If You Do Not Want): Extensive wide undermining was performed. Intermediate Repair Preamble Text (Leave Blank If You Do Not Want): Undermining was performed with blunt dissection. Non-Graft Cartilage Fenestration Text: The cartilage was fenestrated with a 2mm punch biopsy to help facilitate healing. Graft Cartilage Fenestration Text: The cartilage was fenestrated with a 2mm punch biopsy to help facilitate graft survival and healing. Secondary Intention Text (Leave Blank If You Do Not Want): The defect will heal with secondary intention. No Repair - Repaired With Adjacent Surgical Defect Text (Leave Blank If You Do Not Want): After obtaining clear surgical margins the defect was repaired concurrently with another surgical defect which was in close approximation. Advancement Flap (Single) Text: The defect edges were debeveled with a #15 scalpel blade.  Given the location of the defect and the proximity to free margins a single advancement flap was deemed most appropriate.  Using a sterile surgical marker, an appropriate advancement flap was drawn incorporating the defect and placing the expected incisions within the relaxed skin tension lines where possible.    The area thus outlined was incised deep to adipose tissue with a #15 scalpel blade.  The skin margins were undermined to an appropriate distance in all directions utilizing iris scissors. Advancement Flap (Double) Text: The defect edges were debeveled with a #15 scalpel blade.  Given the location of the defect and the proximity to free margins a double advancement flap was deemed most appropriate.  Using a sterile surgical marker, the appropriate advancement flaps were drawn incorporating the defect and placing the expected incisions within the relaxed skin tension lines where possible.    The area thus outlined was incised deep to adipose tissue with a #15 scalpel blade.  The skin margins were undermined to an appropriate distance in all directions utilizing iris scissors. Burow's Advancement Flap Text: The defect edges were debeveled with a #15 scalpel blade.  Given the location of the defect and the proximity to free margins a Burow's advancement flap was deemed most appropriate.  Using a sterile surgical marker, the appropriate advancement flap was drawn incorporating the defect and placing the expected incisions within the relaxed skin tension lines where possible.    The area thus outlined was incised deep to adipose tissue with a #15 scalpel blade.  The skin margins were undermined to an appropriate distance in all directions utilizing iris scissors. Chonodrocutaneous Helical Advancement Flap Text: The defect edges were debeveled with a #15 scalpel blade.  Given the location of the defect and the proximity to free margins a chondrocutaneous helical advancement flap was deemed most appropriate.  Using a sterile surgical marker, the appropriate advancement flap was drawn incorporating the defect and placing the expected incisions within the relaxed skin tension lines where possible.    The area thus outlined was incised deep to adipose tissue with a #15 scalpel blade.  The skin margins were undermined to an appropriate distance in all directions utilizing iris scissors. Crescentic Advancement Flap Text: The defect edges were debeveled with a #15 scalpel blade.  Given the location of the defect and the proximity to free margins a crescentic advancement flap was deemed most appropriate.  Using a sterile surgical marker, the appropriate advancement flap was drawn incorporating the defect and placing the expected incisions within the relaxed skin tension lines where possible.    The area thus outlined was incised deep to adipose tissue with a #15 scalpel blade.  The skin margins were undermined to an appropriate distance in all directions utilizing iris scissors. A-T Advancement Flap Text: The defect edges were debeveled with a #15 scalpel blade.  Given the location of the defect, shape of the defect and the proximity to free margins an A-T advancement flap was deemed most appropriate.  Using a sterile surgical marker, an appropriate advancement flap was drawn incorporating the defect and placing the expected incisions within the relaxed skin tension lines where possible.    The area thus outlined was incised deep to adipose tissue with a #15 scalpel blade.  The skin margins were undermined to an appropriate distance in all directions utilizing iris scissors. O-T Advancement Flap Text: The defect edges were debeveled with a #15 scalpel blade.  Given the location of the defect, shape of the defect and the proximity to free margins an O-T advancement flap was deemed most appropriate.  Using a sterile surgical marker, an appropriate advancement flap was drawn incorporating the defect and placing the expected incisions within the relaxed skin tension lines where possible.    The area thus outlined was incised deep to adipose tissue with a #15 scalpel blade.  The skin margins were undermined to an appropriate distance in all directions utilizing iris scissors. O-L Flap Text: The defect edges were debeveled with a #15 scalpel blade.  Given the location of the defect, shape of the defect and the proximity to free margins an O-L flap was deemed most appropriate.  Using a sterile surgical marker, an appropriate advancement flap was drawn incorporating the defect and placing the expected incisions within the relaxed skin tension lines where possible.    The area thus outlined was incised deep to adipose tissue with a #15 scalpel blade.  The skin margins were undermined to an appropriate distance in all directions utilizing iris scissors. O-Z Flap Text: The defect edges were debeveled with a #15 scalpel blade.  Given the location of the defect, shape of the defect and the proximity to free margins an O-Z flap was deemed most appropriate.  Using a sterile surgical marker, an appropriate transposition flap was drawn incorporating the defect and placing the expected incisions within the relaxed skin tension lines where possible. The area thus outlined was incised deep to adipose tissue with a #15 scalpel blade.  The skin margins were undermined to an appropriate distance in all directions utilizing iris scissors. Double O-Z Flap Text: The defect edges were debeveled with a #15 scalpel blade.  Given the location of the defect, shape of the defect and the proximity to free margins a Double O-Z flap was deemed most appropriate.  Using a sterile surgical marker, an appropriate transposition flap was drawn incorporating the defect and placing the expected incisions within the relaxed skin tension lines where possible. The area thus outlined was incised deep to adipose tissue with a #15 scalpel blade.  The skin margins were undermined to an appropriate distance in all directions utilizing iris scissors. V-Y Flap Text: The defect edges were debeveled with a #15 scalpel blade.  Given the location of the defect, shape of the defect and the proximity to free margins a V-Y flap was deemed most appropriate.  Using a sterile surgical marker, an appropriate advancement flap was drawn incorporating the defect and placing the expected incisions within the relaxed skin tension lines where possible.    The area thus outlined was incised deep to adipose tissue with a #15 scalpel blade.  The skin margins were undermined to an appropriate distance in all directions utilizing iris scissors. Advancement-Rotation Flap Text: The defect edges were debeveled with a #15 scalpel blade.  Given the location of the defect, shape of the defect and the proximity to free margins an advancement-rotation flap was deemed most appropriate.  Using a sterile surgical marker, an appropriate flap was drawn incorporating the defect and placing the expected incisions within the relaxed skin tension lines where possible. The area thus outlined was incised deep to adipose tissue with a #15 scalpel blade.  The skin margins were undermined to an appropriate distance in all directions utilizing iris scissors. Mercedes Flap Text: The defect edges were debeveled with a #15 scalpel blade.  Given the location of the defect, shape of the defect and the proximity to free margins a Mercedes flap was deemed most appropriate.  Using a sterile surgical marker, an appropriate advancement flap was drawn incorporating the defect and placing the expected incisions within the relaxed skin tension lines where possible. The area thus outlined was incised deep to adipose tissue with a #15 scalpel blade.  The skin margins were undermined to an appropriate distance in all directions utilizing iris scissors. Modified Advancement Flap Text: The defect edges were debeveled with a #15 scalpel blade.  Given the location of the defect, shape of the defect and the proximity to free margins a modified advancement flap was deemed most appropriate.  Using a sterile surgical marker, an appropriate advancement flap was drawn incorporating the defect and placing the expected incisions within the relaxed skin tension lines where possible.    The area thus outlined was incised deep to adipose tissue with a #15 scalpel blade.  The skin margins were undermined to an appropriate distance in all directions utilizing iris scissors. Mucosal Advancement Flap Text: Given the location of the defect, shape of the defect and the proximity to free margins a mucosal advancement flap was deemed most appropriate. Incisions were made with a 15 blade scalpel in the appropriate fashion along the cutaneous vermilion border and the mucosal lip. The remaining actinically damaged mucosal tissue was excised.  The mucosal advancement flap was then elevated to the gingival sulcus with care taken to preserve the neurovascular structures and advanced into the primary defect. Care was taken to ensure that precise realignment of the vermilion border was achieved. Peng Advancement Flap Text: The defect edges were debeveled with a #15 scalpel blade.  Given the location of the defect, shape of the defect and the proximity to free margins a Peng advancement flap was deemed most appropriate.  Using a sterile surgical marker, an appropriate advancement flap was drawn incorporating the defect and placing the expected incisions within the relaxed skin tension lines where possible. The area thus outlined was incised deep to adipose tissue with a #15 scalpel blade.  The skin margins were undermined to an appropriate distance in all directions utilizing iris scissors. Hatchet Flap Text: The defect edges were debeveled with a #15 scalpel blade.  Given the location of the defect, shape of the defect and the proximity to free margins a hatchet flap was deemed most appropriate.  Using a sterile surgical marker, an appropriate hatchet flap was drawn incorporating the defect and placing the expected incisions within the relaxed skin tension lines where possible.    The area thus outlined was incised deep to adipose tissue with a #15 scalpel blade.  The skin margins were undermined to an appropriate distance in all directions utilizing iris scissors. Rotation Flap Text: The defect edges were debeveled with a #15 scalpel blade.  Given the location of the defect, shape of the defect and the proximity to free margins a rotation flap was deemed most appropriate.  Using a sterile surgical marker, an appropriate rotation flap was drawn incorporating the defect and placing the expected incisions within the relaxed skin tension lines where possible.    The area thus outlined was incised deep to adipose tissue with a #15 scalpel blade.  The skin margins were undermined to an appropriate distance in all directions utilizing iris scissors. Spiral Flap Text: The defect edges were debeveled with a #15 scalpel blade.  Given the location of the defect, shape of the defect and the proximity to free margins a spiral flap was deemed most appropriate.  Using a sterile surgical marker, an appropriate rotation flap was drawn incorporating the defect and placing the expected incisions within the relaxed skin tension lines where possible. The area thus outlined was incised deep to adipose tissue with a #15 scalpel blade.  The skin margins were undermined to an appropriate distance in all directions utilizing iris scissors. Staged Advancement Flap Text: The defect edges were debeveled with a #15 scalpel blade.  Given the location of the defect, shape of the defect and the proximity to free margins a staged advancement flap was deemed most appropriate.  Using a sterile surgical marker, an appropriate advancement flap was drawn incorporating the defect and placing the expected incisions within the relaxed skin tension lines where possible. The area thus outlined was incised deep to adipose tissue with a #15 scalpel blade.  The skin margins were undermined to an appropriate distance in all directions utilizing iris scissors. Star Wedge Flap Text: The defect edges were debeveled with a #15 scalpel blade.  Given the location of the defect, shape of the defect and the proximity to free margins a star wedge flap was deemed most appropriate.  Using a sterile surgical marker, an appropriate rotation flap was drawn incorporating the defect and placing the expected incisions within the relaxed skin tension lines where possible. The area thus outlined was incised deep to adipose tissue with a #15 scalpel blade.  The skin margins were undermined to an appropriate distance in all directions utilizing iris scissors. Transposition Flap Text: The defect edges were debeveled with a #15 scalpel blade.  Given the location of the defect and the proximity to free margins a transposition flap was deemed most appropriate.  Using a sterile surgical marker, an appropriate transposition flap was drawn incorporating the defect.    The area thus outlined was incised deep to adipose tissue with a #15 scalpel blade.  The skin margins were undermined to an appropriate distance in all directions utilizing iris scissors. Muscle Hinge Flap Text: The defect edges were debeveled with a #15 scalpel blade.  Given the size, depth and location of the defect and the proximity to free margins a muscle hinge flap was deemed most appropriate.  Using a sterile surgical marker, an appropriate hinge flap was drawn incorporating the defect. The area thus outlined was incised with a #15 scalpel blade.  The skin margins were undermined to an appropriate distance in all directions utilizing iris scissors. Nasal Turnover Hinge Flap Text: The defect edges were debeveled with a #15 scalpel blade.  Given the size, depth, location of the defect and the defect being full thickness a nasal turnover hinge flap was deemed most appropriate.  Using a sterile surgical marker, an appropriate hinge flap was drawn incorporating the defect. The area thus outlined was incised with a #15 scalpel blade. The flap was designed to recreate the nasal mucosal lining and the alar rim. The skin margins were undermined to an appropriate distance in all directions utilizing iris scissors. Nasalis-Muscle-Based Myocutaneous Island Pedicle Flap Text: Using a #15 blade, an incision was made around the donor flap to the level of the nasalis muscle. Wide lateral undermining was then performed in both the subcutaneous plane above the nasalis muscle, and in a submuscular plane just above periosteum. This allowed the formation of a free nasalis muscle axial pedicle (based on the angular artery) which was still attached to the actual cutaneous flap, increasing its mobility and vascular viability. Hemostasis was obtained with pinpoint electrocoagulation. The flap was mobilized into position and the pivotal anchor points positioned and stabilized with buried interrupted sutures. Subcutaneous and dermal tissues were closed in a multilayered fashion with sutures. Tissue redundancies were excised, and the epidermal edges were apposed without significant tension and sutured with sutures. Orbicularis Oris Muscle Flap Text: The defect edges were debeveled with a #15 scalpel blade.  Given that the defect affected the competency of the oral sphincter an orbicularis oris muscle flap was deemed most appropriate to restore this competency and normal muscle function.  Using a sterile surgical marker, an appropriate flap was drawn incorporating the defect. The area thus outlined was incised with a #15 scalpel blade. Melolabial Transposition Flap Text: The defect edges were debeveled with a #15 scalpel blade.  Given the location of the defect and the proximity to free margins a melolabial flap was deemed most appropriate.  Using a sterile surgical marker, an appropriate melolabial transposition flap was drawn incorporating the defect.    The area thus outlined was incised deep to adipose tissue with a #15 scalpel blade.  The skin margins were undermined to an appropriate distance in all directions utilizing iris scissors. Rhombic Flap Text: The defect edges were debeveled with a #15 scalpel blade.  Given the location of the defect and the proximity to free margins a rhombic flap was deemed most appropriate.  Using a sterile surgical marker, an appropriate rhombic flap was drawn incorporating the defect.    The area thus outlined was incised deep to adipose tissue with a #15 scalpel blade.  The skin margins were undermined to an appropriate distance in all directions utilizing iris scissors. Rhomboid Transposition Flap Text: The defect edges were debeveled with a #15 scalpel blade.  Given the location of the defect and the proximity to free margins a rhomboid transposition flap was deemed most appropriate.  Using a sterile surgical marker, an appropriate rhomboid flap was drawn incorporating the defect.    The area thus outlined was incised deep to adipose tissue with a #15 scalpel blade.  The skin margins were undermined to an appropriate distance in all directions utilizing iris scissors. Bi-Rhombic Flap Text: The defect edges were debeveled with a #15 scalpel blade.  Given the location of the defect and the proximity to free margins a bi-rhombic flap was deemed most appropriate.  Using a sterile surgical marker, an appropriate rhombic flap was drawn incorporating the defect. The area thus outlined was incised deep to adipose tissue with a #15 scalpel blade.  The skin margins were undermined to an appropriate distance in all directions utilizing iris scissors. Helical Rim Advancement Flap Text: The defect edges were debeveled with a #15 blade scalpel.  Given the location of the defect and the proximity to free margins (helical rim) a double helical rim advancement flap was deemed most appropriate.  Using a sterile surgical marker, the appropriate advancement flaps were drawn incorporating the defect and placing the expected incisions between the helical rim and antihelix where possible.  The area thus outlined was incised through and through with a #15 scalpel blade.  With a skin hook and iris scissors, the flaps were gently and sharply undermined and freed up. Bilateral Helical Rim Advancement Flap Text: The defect edges were debeveled with a #15 blade scalpel.  Given the location of the defect and the proximity to free margins (helical rim) a bilateral helical rim advancement flap was deemed most appropriate.  Using a sterile surgical marker, the appropriate advancement flaps were drawn incorporating the defect and placing the expected incisions between the helical rim and antihelix where possible.  The area thus outlined was incised through and through with a #15 scalpel blade.  With a skin hook and iris scissors, the flaps were gently and sharply undermined and freed up. Ear Star Wedge Flap Text: The defect edges were debeveled with a #15 blade scalpel.  Given the location of the defect and the proximity to free margins (helical rim) an ear star wedge flap was deemed most appropriate.  Using a sterile surgical marker, the appropriate flap was drawn incorporating the defect and placing the expected incisions between the helical rim and antihelix where possible.  The area thus outlined was incised through and through with a #15 scalpel blade. Banner Transposition Flap Text: The defect edges were debeveled with a #15 scalpel blade.  Given the location of the defect and the proximity to free margins a Banner transposition flap was deemed most appropriate.  Using a sterile surgical marker, an appropriate flap drawn around the defect. The area thus outlined was incised deep to adipose tissue with a #15 scalpel blade.  The skin margins were undermined to an appropriate distance in all directions utilizing iris scissors. Bilobed Flap Text: The defect edges were debeveled with a #15 scalpel blade.  Given the location of the defect and the proximity to free margins a bilobe flap was deemed most appropriate.  Using a sterile surgical marker, an appropriate bilobe flap drawn around the defect.    The area thus outlined was incised deep to adipose tissue with a #15 scalpel blade.  The skin margins were undermined to an appropriate distance in all directions utilizing iris scissors. Bilobed Transposition Flap Text: The defect edges were debeveled with a #15 scalpel blade.  Given the location of the defect and the proximity to free margins a bilobed transposition flap was deemed most appropriate.  Using a sterile surgical marker, an appropriate bilobe flap drawn around the defect.    The area thus outlined was incised deep to adipose tissue with a #15 scalpel blade.  The skin margins were undermined to an appropriate distance in all directions utilizing iris scissors. Trilobed Flap Text: The defect edges were debeveled with a #15 scalpel blade.  Given the location of the defect and the proximity to free margins a trilobed flap was deemed most appropriate.  Using a sterile surgical marker, an appropriate trilobed flap drawn around the defect.    The area thus outlined was incised deep to adipose tissue with a #15 scalpel blade.  The skin margins were undermined to an appropriate distance in all directions utilizing iris scissors. Dorsal Nasal Flap Text: The defect edges were debeveled with a #15 scalpel blade.  Given the location of the defect and the proximity to free margins a dorsal nasal flap was deemed most appropriate.  Using a sterile surgical marker, an appropriate dorsal nasal flap was drawn around the defect.    The area thus outlined was incised deep to adipose tissue with a #15 scalpel blade.  The skin margins were undermined to an appropriate distance in all directions utilizing iris scissors. Island Pedicle Flap Text: The defect edges were debeveled with a #15 scalpel blade.  Given the location of the defect, shape of the defect and the proximity to free margins an island pedicle advancement flap was deemed most appropriate.  Using a sterile surgical marker, an appropriate advancement flap was drawn incorporating the defect, outlining the appropriate donor tissue and placing the expected incisions within the relaxed skin tension lines where possible.    The area thus outlined was incised deep to adipose tissue with a #15 scalpel blade.  The skin margins were undermined to an appropriate distance in all directions around the primary defect and laterally outward around the island pedicle utilizing iris scissors.  There was minimal undermining beneath the pedicle flap. Island Pedicle Flap With Canthal Suspension Text: The defect edges were debeveled with a #15 scalpel blade.  Given the location of the defect, shape of the defect and the proximity to free margins an island pedicle advancement flap was deemed most appropriate.  Using a sterile surgical marker, an appropriate advancement flap was drawn incorporating the defect, outlining the appropriate donor tissue and placing the expected incisions within the relaxed skin tension lines where possible. The area thus outlined was incised deep to adipose tissue with a #15 scalpel blade.  The skin margins were undermined to an appropriate distance in all directions around the primary defect and laterally outward around the island pedicle utilizing iris scissors.  There was minimal undermining beneath the pedicle flap. A suspension suture was placed in the canthal tendon to prevent tension and prevent ectropion. Alar Island Pedicle Flap Text: The defect edges were debeveled with a #15 scalpel blade.  Given the location of the defect, shape of the defect and the proximity to the alar rim an island pedicle advancement flap was deemed most appropriate.  Using a sterile surgical marker, an appropriate advancement flap was drawn incorporating the defect, outlining the appropriate donor tissue and placing the expected incisions within the nasal ala running parallel to the alar rim. The area thus outlined was incised with a #15 scalpel blade.  The skin margins were undermined minimally to an appropriate distance in all directions around the primary defect and laterally outward around the island pedicle utilizing iris scissors.  There was minimal undermining beneath the pedicle flap. Double Island Pedicle Flap Text: The defect edges were debeveled with a #15 scalpel blade.  Given the location of the defect, shape of the defect and the proximity to free margins a double island pedicle advancement flap was deemed most appropriate.  Using a sterile surgical marker, an appropriate advancement flap was drawn incorporating the defect, outlining the appropriate donor tissue and placing the expected incisions within the relaxed skin tension lines where possible.    The area thus outlined was incised deep to adipose tissue with a #15 scalpel blade.  The skin margins were undermined to an appropriate distance in all directions around the primary defect and laterally outward around the island pedicle utilizing iris scissors.  There was minimal undermining beneath the pedicle flap. Island Pedicle Flap-Requiring Vessel Identification Text: The defect edges were debeveled with a #15 scalpel blade.  Given the location of the defect, shape of the defect and the proximity to free margins an island pedicle advancement flap was deemed most appropriate.  Using a sterile surgical marker, an appropriate advancement flap was drawn, based on the axial vessel mentioned above, incorporating the defect, outlining the appropriate donor tissue and placing the expected incisions within the relaxed skin tension lines where possible.    The area thus outlined was incised deep to adipose tissue with a #15 scalpel blade.  The skin margins were undermined to an appropriate distance in all directions around the primary defect and laterally outward around the island pedicle utilizing iris scissors.  There was minimal undermining beneath the pedicle flap. Keystone Flap Text: The defect edges were debeveled with a #15 scalpel blade.  Given the location of the defect, shape of the defect a keystone flap was deemed most appropriate.  Using a sterile surgical marker, an appropriate keystone flap was drawn incorporating the defect, outlining the appropriate donor tissue and placing the expected incisions within the relaxed skin tension lines where possible. The area thus outlined was incised deep to adipose tissue with a #15 scalpel blade.  The skin margins were undermined to an appropriate distance in all directions around the primary defect and laterally outward around the flap utilizing iris scissors. O-T Plasty Text: The defect edges were debeveled with a #15 scalpel blade.  Given the location of the defect, shape of the defect and the proximity to free margins an O-T plasty was deemed most appropriate.  Using a sterile surgical marker, an appropriate O-T plasty was drawn incorporating the defect and placing the expected incisions within the relaxed skin tension lines where possible.    The area thus outlined was incised deep to adipose tissue with a #15 scalpel blade.  The skin margins were undermined to an appropriate distance in all directions utilizing iris scissors. O-Z Plasty Text: The defect edges were debeveled with a #15 scalpel blade.  Given the location of the defect, shape of the defect and the proximity to free margins an O-Z plasty (double transposition flap) was deemed most appropriate.  Using a sterile surgical marker, the appropriate transposition flaps were drawn incorporating the defect and placing the expected incisions within the relaxed skin tension lines where possible.    The area thus outlined was incised deep to adipose tissue with a #15 scalpel blade.  The skin margins were undermined to an appropriate distance in all directions utilizing iris scissors.  Hemostasis was achieved with electrocautery.  The flaps were then transposed into place, one clockwise and the other counterclockwise, and anchored with interrupted buried subcutaneous sutures. Double O-Z Plasty Text: The defect edges were debeveled with a #15 scalpel blade.  Given the location of the defect, shape of the defect and the proximity to free margins a Double O-Z plasty (double transposition flap) was deemed most appropriate.  Using a sterile surgical marker, the appropriate transposition flaps were drawn incorporating the defect and placing the expected incisions within the relaxed skin tension lines where possible. The area thus outlined was incised deep to adipose tissue with a #15 scalpel blade.  The skin margins were undermined to an appropriate distance in all directions utilizing iris scissors.  Hemostasis was achieved with electrocautery.  The flaps were then transposed into place, one clockwise and the other counterclockwise, and anchored with interrupted buried subcutaneous sutures. V-Y Plasty Text: The defect edges were debeveled with a #15 scalpel blade.  Given the location of the defect, shape of the defect and the proximity to free margins an V-Y advancement flap was deemed most appropriate.  Using a sterile surgical marker, an appropriate advancement flap was drawn incorporating the defect and placing the expected incisions within the relaxed skin tension lines where possible.    The area thus outlined was incised deep to adipose tissue with a #15 scalpel blade.  The skin margins were undermined to an appropriate distance in all directions utilizing iris scissors. H Plasty Text: Given the location of the defect, shape of the defect and the proximity to free margins a H-plasty was deemed most appropriate for repair.  Using a sterile surgical marker, the appropriate advancement arms of the H-plasty were drawn incorporating the defect and placing the expected incisions within the relaxed skin tension lines where possible. The area thus outlined was incised deep to adipose tissue with a #15 scalpel blade. The skin margins were undermined to an appropriate distance in all directions utilizing iris scissors.  The opposing advancement arms were then advanced into place in opposite direction and anchored with interrupted buried subcutaneous sutures. W Plasty Text: The lesion was extirpated to the level of the fat with a #15 scalpel blade.  Given the location of the defect, shape of the defect and the proximity to free margins a W-plasty was deemed most appropriate for repair.  Using a sterile surgical marker, the appropriate transposition arms of the W-plasty were drawn incorporating the defect and placing the expected incisions within the relaxed skin tension lines where possible.    The area thus outlined was incised deep to adipose tissue with a #15 scalpel blade.  The skin margins were undermined to an appropriate distance in all directions utilizing iris scissors.  The opposing transposition arms were then transposed into place in opposite direction and anchored with interrupted buried subcutaneous sutures. Z Plasty Text: The lesion was extirpated to the level of the fat with a #15 scalpel blade.  Given the location of the defect, shape of the defect and the proximity to free margins a Z-plasty was deemed most appropriate for repair.  Using a sterile surgical marker, the appropriate transposition arms of the Z-plasty were drawn incorporating the defect and placing the expected incisions within the relaxed skin tension lines where possible.    The area thus outlined was incised deep to adipose tissue with a #15 scalpel blade.  The skin margins were undermined to an appropriate distance in all directions utilizing iris scissors.  The opposing transposition arms were then transposed into place in opposite direction and anchored with interrupted buried subcutaneous sutures. Zygomaticofacial Flap Text: Given the location of the defect, shape of the defect and the proximity to free margins a zygomaticofacial flap was deemed most appropriate for repair.  Using a sterile surgical marker, the appropriate flap was drawn incorporating the defect and placing the expected incisions within the relaxed skin tension lines where possible. The area thus outlined was incised deep to adipose tissue with a #15 scalpel blade with preservation of a vascular pedicle.  The skin margins were undermined to an appropriate distance in all directions utilizing iris scissors.  The flap was then placed into the defect and anchored with interrupted buried subcutaneous sutures. Cheek Interpolation Flap Text: A decision was made to reconstruct the defect utilizing an interpolation axial flap and a staged reconstruction.  A telfa template was made of the defect.  This telfa template was then used to outline the Cheek Interpolation flap.  The donor area for the pedicle flap was then injected with anesthesia.  The flap was excised through the skin and subcutaneous tissue down to the layer of the underlying musculature.  The interpolation flap was carefully excised within this deep plane to maintain its blood supply.  The edges of the donor site were undermined.   The donor site was closed in a primary fashion.  The pedicle was then rotated into position and sutured.  Once the tube was sutured into place, adequate blood supply was confirmed with blanching and refill.  The pedicle was then wrapped with xeroform gauze and dressed appropriately with a telfa and gauze bandage to ensure continued blood supply and protect the attached pedicle. Cheek-To-Nose Interpolation Flap Text: A decision was made to reconstruct the defect utilizing an interpolation axial flap and a staged reconstruction.  A telfa template was made of the defect.  This telfa template was then used to outline the Cheek-To-Nose Interpolation flap.  The donor area for the pedicle flap was then injected with anesthesia.  The flap was excised through the skin and subcutaneous tissue down to the layer of the underlying musculature.  The interpolation flap was carefully excised within this deep plane to maintain its blood supply.  The edges of the donor site were undermined.   The donor site was closed in a primary fashion.  The pedicle was then rotated into position and sutured.  Once the tube was sutured into place, adequate blood supply was confirmed with blanching and refill.  The pedicle was then wrapped with xeroform gauze and dressed appropriately with a telfa and gauze bandage to ensure continued blood supply and protect the attached pedicle. Interpolation Flap Text: A decision was made to reconstruct the defect utilizing an interpolation axial flap and a staged reconstruction.  A telfa template was made of the defect.  This telfa template was then used to outline the interpolation flap.  The donor area for the pedicle flap was then injected with anesthesia.  The flap was excised through the skin and subcutaneous tissue down to the layer of the underlying musculature.  The interpolation flap was carefully excised within this deep plane to maintain its blood supply.  The edges of the donor site were undermined.   The donor site was closed in a primary fashion.  The pedicle was then rotated into position and sutured.  Once the tube was sutured into place, adequate blood supply was confirmed with blanching and refill.  The pedicle was then wrapped with xeroform gauze and dressed appropriately with a telfa and gauze bandage to ensure continued blood supply and protect the attached pedicle. Melolabial Interpolation Flap Text: A decision was made to reconstruct the defect utilizing an interpolation axial flap and a staged reconstruction.  A telfa template was made of the defect.  This telfa template was then used to outline the melolabial interpolation flap.  The donor area for the pedicle flap was then injected with anesthesia.  The flap was excised through the skin and subcutaneous tissue down to the layer of the underlying musculature.  The pedicle flap was carefully excised within this deep plane to maintain its blood supply.  The edges of the donor site were undermined.   The donor site was closed in a primary fashion.  The pedicle was then rotated into position and sutured.  Once the tube was sutured into place, adequate blood supply was confirmed with blanching and refill.  The pedicle was then wrapped with xeroform gauze and dressed appropriately with a telfa and gauze bandage to ensure continued blood supply and protect the attached pedicle. Mastoid Interpolation Flap Text: A decision was made to reconstruct the defect utilizing an interpolation axial flap and a staged reconstruction.  A telfa template was made of the defect.  This telfa template was then used to outline the mastoid interpolation flap.  The donor area for the pedicle flap was then injected with anesthesia.  The flap was excised through the skin and subcutaneous tissue down to the layer of the underlying musculature.  The pedicle flap was carefully excised within this deep plane to maintain its blood supply.  The edges of the donor site were undermined.   The donor site was closed in a primary fashion.  The pedicle was then rotated into position and sutured.  Once the tube was sutured into place, adequate blood supply was confirmed with blanching and refill.  The pedicle was then wrapped with xeroform gauze and dressed appropriately with a telfa and gauze bandage to ensure continued blood supply and protect the attached pedicle. Posterior Auricular Interpolation Flap Text: A decision was made to reconstruct the defect utilizing an interpolation axial flap and a staged reconstruction.  A telfa template was made of the defect.  This telfa template was then used to outline the posterior auricular interpolation flap.  The donor area for the pedicle flap was then injected with anesthesia.  The flap was excised through the skin and subcutaneous tissue down to the layer of the underlying musculature.  The pedicle flap was carefully excised within this deep plane to maintain its blood supply.  The edges of the donor site were undermined.   The donor site was closed in a primary fashion.  The pedicle was then rotated into position and sutured.  Once the tube was sutured into place, adequate blood supply was confirmed with blanching and refill.  The pedicle was then wrapped with xeroform gauze and dressed appropriately with a telfa and gauze bandage to ensure continued blood supply and protect the attached pedicle. Paramedian Forehead Flap Text: A decision was made to reconstruct the defect utilizing an interpolation axial flap and a staged reconstruction.  A telfa template was made of the defect.  This telfa template was then used to outline the paramedian forehead pedicle flap.  The donor area for the pedicle flap was then injected with anesthesia.  The flap was excised through the skin and subcutaneous tissue down to the layer of the underlying musculature.  The pedicle flap was carefully excised within this deep plane to maintain its blood supply.  The edges of the donor site were undermined.   The donor site was closed in a primary fashion.  The pedicle was then rotated into position and sutured.  Once the tube was sutured into place, adequate blood supply was confirmed with blanching and refill.  The pedicle was then wrapped with xeroform gauze and dressed appropriately with a telfa and gauze bandage to ensure continued blood supply and protect the attached pedicle. Cheiloplasty (Less Than 50%) Text: A decision was made to reconstruct the defect with a  cheiloplasty.  The defect was undermined extensively.  Additional obicularis oris muscle was excised with a 15 blade scalpel.  The defect was converted into a full thickness wedge, of less than 50% of the vertical height of the lip, to facilite a better cosmetic result.  Small vessels were then tied off with 5-0 monocyrl. The obicularis oris, superficial fascia, adipose and dermis were then reapproximated.  After the deeper layers were approximated the epidermis was reapproximated with particular care given to realign the vermilion border. Cheiloplasty (Complex) Text: A decision was made to reconstruct the defect with a  cheiloplasty.  The defect was undermined extensively.  Additional obicularis oris muscle was excised with a 15 blade scalpel.  The defect was converted into a full thickness wedge to facilite a better cosmetic result.  Small vessels were then tied off with 5-0 monocyrl. The obicularis oris, superficial fascia, adipose and dermis were then reapproximated.  After the deeper layers were approximated the epidermis was reapproximated with particular care given to realign the vermilion border. Ear Wedge Repair Text: A wedge excision was completed by carrying down an excision through the full thickness of the ear and cartilage with an inward facing Burow's triangle. The wound was then closed in a layered fashion. Full Thickness Lip Wedge Repair (Flap) Text: Given the location of the defect and the proximity to free margins a full thickness wedge repair was deemed most appropriate.  Using a sterile surgical marker, the appropriate repair was drawn incorporating the defect and placing the expected incisions perpendicular to the vermilion border.  The vermilion border was also meticulously outlined to ensure appropriate reapproximation during the repair.  The area thus outlined was incised through and through with a #15 scalpel blade.  The muscularis and dermis were reaproximated with deep sutures following hemostasis. Care was taken to realign the vermilion border before proceeding with the superficial closure.  Once the vermilion was realigned the superfical and mucosal closure was finished. Ftsg Text: The defect edges were debeveled with a #15 scalpel blade.  Given the location of the defect, shape of the defect and the proximity to free margins a full thickness skin graft was deemed most appropriate.  Using a sterile surgical marker, the primary defect shape was transferred to the donor site. The area thus outlined was incised deep to adipose tissue with a #15 scalpel blade.  The harvested graft was then trimmed of adipose tissue until only dermis and epidermis was left.  The skin margins of the secondary defect were undermined to an appropriate distance in all directions utilizing iris scissors.  The secondary defect was closed with interrupted buried subcutaneous sutures.  The skin edges were then re-apposed with running  sutures.  The skin graft was then placed in the primary defect and oriented appropriately. Split-Thickness Skin Graft Text: The defect edges were debeveled with a #15 scalpel blade.  Given the location of the defect, shape of the defect and the proximity to free margins a split thickness skin graft was deemed most appropriate.  Using a sterile surgical marker, the primary defect shape was transferred to the donor site. The split thickness graft was then harvested.  The skin graft was then placed in the primary defect and oriented appropriately. Burow's Graft Text: The defect edges were debeveled with a #15 scalpel blade.  Given the location of the defect, shape of the defect, the proximity to free margins and the presence of a standing cone deformity a Burow's skin graft was deemed most appropriate. The standing cone was removed and this tissue was then trimmed to the shape of the primary defect. The adipose tissue was also removed until only dermis and epidermis were left.  The skin margins of the secondary defect were undermined to an appropriate distance in all directions utilizing iris scissors.  The secondary defect was closed with interrupted buried subcutaneous sutures.  The skin edges were then re-apposed with running  sutures.  The skin graft was then placed in the primary defect and oriented appropriately. Cartilage Graft Text: The defect edges were debeveled with a #15 scalpel blade.  Given the location of the defect, shape of the defect, the fact the defect involved a full thickness cartilage defect a cartilage graft was deemed most appropriate.  An appropriate donor site was identified, cleansed, and anesthetized. The cartilage graft was then harvested and transferred to the recipient site, oriented appropriately and then sutured into place.  The secondary defect was then repaired using a primary closure. Composite Graft Text: The defect edges were debeveled with a #15 scalpel blade.  Given the location of the defect, shape of the defect, the proximity to free margins and the fact the defect was full thickness a composite graft was deemed most appropriate.  The defect was outline and then transferred to the donor site.  A full thickness graft was then excised from the donor site. The graft was then placed in the primary defect, oriented appropriately and then sutured into place.  The secondary defect was then repaired using a primary closure. Epidermal Autograft Text: The defect edges were debeveled with a #15 scalpel blade.  Given the location of the defect, shape of the defect and the proximity to free margins an epidermal autograft was deemed most appropriate.  Using a sterile surgical marker, the primary defect shape was transferred to the donor site. The epidermal graft was then harvested.  The skin graft was then placed in the primary defect and oriented appropriately. Dermal Autograft Text: The defect edges were debeveled with a #15 scalpel blade.  Given the location of the defect, shape of the defect and the proximity to free margins a dermal autograft was deemed most appropriate.  Using a sterile surgical marker, the primary defect shape was transferred to the donor site. The area thus outlined was incised deep to adipose tissue with a #15 scalpel blade.  The harvested graft was then trimmed of adipose and epidermal tissue until only dermis was left.  The skin graft was then placed in the primary defect and oriented appropriately. Skin Substitute Text: Given the location of the defect, shape of the defect and the proximity to free margins a skin substitute graft was deemed most appropriate.  The graft material was trimmed to fit the size of the defect. The graft was then placed in the primary defect and oriented appropriately. Tissue Cultured Epidermal Autograft Text: The defect edges were debeveled with a #15 scalpel blade.  Given the location of the defect, shape of the defect and the proximity to free margins a tissue cultured epidermal autograft was deemed most appropriate.  The graft was then trimmed to fit the size of the defect.  The graft was then placed in the primary defect and oriented appropriately. Xenograft Text: The defect edges were debeveled with a #15 scalpel blade.  Given the location of the defect, shape of the defect and the proximity to free margins a xenograft was deemed most appropriate.  The graft was then trimmed to fit the size of the defect.  The graft was then placed in the primary defect and oriented appropriately. Purse String (Simple) Text: Given the location of the defect and the characteristics of the surrounding skin a purse string closure was deemed most appropriate.  Undermining was performed circumfirentially around the surgical defect.  A purse string suture was then placed and tightened. Purse String (Intermediate) Text: Given the location of the defect and the characteristics of the surrounding skin a purse string intermediate closure was deemed most appropriate.  Undermining was performed circumfirentially around the surgical defect.  A purse string suture was then placed and tightened. Partial Purse String (Simple) Text: Given the location of the defect and the characteristics of the surrounding skin a simple purse string closure was deemed most appropriate.  Undermining was performed circumfirentially around the surgical defect.  A purse string suture was then placed and tightened. Wound tension only allowed a partial closure of the circular defect. Partial Purse String (Intermediate) Text: Given the location of the defect and the characteristics of the surrounding skin an intermediate purse string closure was deemed most appropriate.  Undermining was performed circumfirentially around the surgical defect.  A purse string suture was then placed and tightened. Wound tension only allowed a partial closure of the circular defect. Localized Dermabrasion Text: The patient was draped in routine manner.  Localized dermabrasion using 3 x 17 mm wire brush was performed in routine manner to papillary dermis. This spot dermabrasion is being performed to complete skin cancer reconstruction. It also will eliminate the other sun damaged precancerous cells that are known to be part of the regional effect of a lifetime's worth of sun exposure. This localized dermabrasion is therapeutic and should not be considered cosmetic in any regard. Tarsorrhaphy Text: A tarsorrhaphy was performed using Frost sutures. Complex Repair And Flap Additional Text (Will Appearing After The Standard Complex Repair Text): The complex repair was not sufficient to completely close the primary defect. The remaining additional defect was repaired with the flap mentioned below. Complex Repair And Graft Additional Text (Will Appearing After The Standard Complex Repair Text): The complex repair was not sufficient to completely close the primary defect. The remaining additional defect was repaired with the graft mentioned below. Manual Repair Warning Statement: We plan on removing the manually selected variable below in favor of our much easier automatic structured text blocks found in the previous tab. We decided to do this to help make the flow better and give you the full power of structured data. Manual selection is never going to be ideal in our platform and I would encourage you to avoid using manual selection from this point on, especially since I will be sunsetting this feature. It is important that you do one of two things with the customized text below. First, you can save all of the text in a word file so you can have it for future reference. Second, transfer the text to the appropriate area in the Library tab. Lastly, if there is a flap or graft type which we do not have you need to let us know right away so I can add it in before the variable is hidden. No need to panic, we plan to give you roughly 6 months to make the change. Same Histology In Subsequent Stages Text: The pattern and morphology of the tumor is as described in the first stage. No Residual Tumor Seen Histology Text: There were no malignant cells seen in the sections examined. Inflammation Suggestive Of Cancer Camouflage Histology Text: There was a dense lymphocytic infiltrate which prevented adequate histologic evaluation of adjacent structures. Bcc Histology Text: There were numerous aggregates of basaloid cells. Bcc Infiltrative Histology Text: There were numerous aggregates of basaloid cells demonstrating an infiltrative pattern. Mart-1 - Positive Histology Text: MART-1 staining demonstrates areas of higher density and clustering of melanocytes with Pagetoid spread upwards within the epidermis. The surgical margins are positive for tumor cells. Mart-1 - Negative Histology Text: MART-1 staining demonstrates a normal density and pattern of melanocytes along the dermal-epidermal junction. The surgical margins are negative for tumor cells. Information: Selecting Yes will display possible errors in your note based on the variables you have selected. This validation is only offered as a suggestion for you. PLEASE NOTE THAT THE VALIDATION TEXT WILL BE REMOVED WHEN YOU FINALIZE YOUR NOTE. IF YOU WANT TO FAX A PRELIMINARY NOTE YOU WILL NEED TO TOGGLE THIS TO 'NO' IF YOU DO NOT WANT IT IN YOUR FAXED NOTE.

## 2021-07-28 NOTE — DOCUMENTATION QUERY
RN asked to call back later- she is too busy with another pt to talk currently and cannot give any info on how her patient can transport to Radiology for pelvis xrays @[phone]   [phone]

## 2021-08-10 NOTE — PROGRESS NOTES
Pt wishes for fingersticks to be discontinued as BG has been normal and requires no insulin coverage. To discuss with MD in AM   Yes

## 2022-05-17 NOTE — DISCHARGE PLANNING
CC:  Naomie Paiz is here today for ER F/U, Thyroid Problem, and Office Visit     Medications: medications verified, no change  Refills needed today?  NO  denies Latex allergy or sensitivity  Patient would like communication of their results via:      Cell Phone:   Telephone Information:   Mobile [phone]     Okay to leave a message containing results? Yes  Tobacco history: verified  Patient's current [facility] status: Active.    Pharmacy Verified?  Yes    Health Maintenance Due   Topic Date Due   • COVID-19 Vaccine (1) Never done       Patient is due for topics as listed above but is not proceeding with Immunization(s) COVID-19 at this time. Patient cannot receive covid vaccine today.            PASRR     [id number]

## 2022-11-21 NOTE — PROGRESS NOTES
Patient A&O x4; lethargic and tearful through the day. Patient c/o a lot of pain and discomfort today. PRN oxycodone given for pain and mirapex for restless legs. Patient incontinent of stool, sometimes incontinent of bladder. Uses the bedpan or hernal. PIV patent and intact infusing IV abx and tko fluids. Patient refusing q2 turns and pillows for positioning. Waffle cushion and barrier paste in use. NPO at midnight tonight for biopsy tomorrow. Call light within reach, bed alarm in use, safety precautions in place.   No

## 2023-06-06 NOTE — CONSULTS
"Reason for PC Consult: Advance Care Planning    Consulted by: Dr. Gonzalez    Assessment:  General: 69 y/o female who resides in [place], [place] transferred from outlying facility on 3/8/19 for R hip pain, iliopsoas lesion and concerns of infection vs metastatic disease. Brain lesions also noted on MRI but not amenable to biopsy per IR. Pt followed by ID and treated for possible infection, however MRI of the brain has not changed; concern remains for metastatic disease. Pt has had a prolonged course and decline since admission in function and mentation. Pt seen by PC MD in 12/2018 for ACP/pain management    Social: Pt lives in [place] with her  who has \"memory problems\" per previous discussions. They've been together over 40 years but only  for 10. She has no children. Her brother René lives in the [place] and he is her DPOA.    Consults: ID, ortho    Dyspnea: No-    Last BM: 04/16/19-    Pain: No-    Depression: Mood appropriate for situation-    Dementia: No;       Spiritual:  Is Synagogue or spirituality important for coping with this illness? No-    Has a  or spiritual provider visit been requested? No    Palliative Performance Scale: 40%    Advance Directive: Advance Directive-    DPOA: Yes- brother René  POLST: Yes- DNR/selective/no TF    Code Status: Full- DNR on POLST    Outcome:  PC RN stopped by the pt's room and she was up in the chair, hunched over sleeping. Updates received from MD. Discussed her ongoing decline and confusion, noting her previously completed POLST and conversation with Dr. Orellana about her desire for DNR/DNI. PC RN visited bedside again and RN/CNA were working to get the patient back to bed. Per RN, Muriel is confused today and unable to participate meaningfully in her POC.    PC RN placed a call to POA/brother René at [phone] and explained the role of PC. PC RN discussed plan to meet tomorrow and he was agreeable noting he plans to arrive between 12-1 on " "Thursday and will call this RN when he arrives. He acknowledges the changes in his sister and states \"We sort of know what this all is.\" He expressed frustration with her fluctuating mentation and how \"she's different every day.\" He had initially hoped to get her to the [place] where she can be closer to family but notes that the cost is too great. PC RN offered to discuss the whole situation, including options for DC and discuss further with the team. He was appreciative of the support and guidance with Muriel's situation. PC contact information provided to René and encouraged to call with any questions or concerns before our meeting tomorrow.    Email sent to  leadership to discuss escalation of discharge options to [place] pending meeting tomorrow.    Updated: BS RN/SHABBIR/MD    Plan: meeting tomorrow    Recommendations: I do not recommend an ethics or hospice consult at this time because formal meeting tomorrow.    Thank you for allowing Palliative Care to participate in this patient's care. Please feel free to call [phone] with any questions or concerns.  " Yes

## 2023-07-05 NOTE — THERAPY
"Speech Language Therapy Clinical Swallow Evaluation completed.  Functional Status: Pt seen on this date for a clinical swallow evaluation. Pt A&Ox 3 with disorientation for reason for admit, however, confusion noted through out session. Pt with moderate dysarthria which she reports is not baseline and pt with diffuse weakness. Reduced lingual and labial ROM noted and dried secretions in back of throat which pt was unable to clear with weak volitional cough. PO trials of single ice chips, NTL via teaspoon and cup sip, purees, and thin liquids were presented to pt and slight wet voice x1 noted with NTL and immediate coughing noted with thin liquids which may be concerning for aspiration. Upon palpation, laryngeal elevation was weak and became more and more delayed up to 10 seconds as session progressed. Pt self-feeding 2-3 sips from cup before c/o fatigue and increased fatigue noted as trials progressed. Increased WOB appreciated with thin liquids. At this time, would recommend that pt be placed on dysphagia I/NTL with 1:1 feeding and implementation of swallowing compensatory strategies (small bites/sips, reduced eating rate, up at 90* during meals, no straws.) Dysphagia education and recommendations provided to pt and she verbalized understanding. SLP following for dysphagia therapy.    Recommendations - Diet:  Dysphagia I/NTL with 1:1 feeding and implementation of swallowing compensatory strategies (small bites/sips, reduced eating rate, up at 90* during meals, no straws.)                          Strategies: Strict 1:1 feeding , No Straws and Head of Bed at 90 Degrees                          Medication Administration:  Crush in puree  Plan of Care: Will benefit from Speech Therapy 3 times per week  Post-Acute Therapy: Recommend inpatient transitional care services for continued speech therapy services.        See \"Rehab Therapy-Acute\" Patient Summary Report for complete documentation.   " Mohs Case Number: [id number] Mohs Case Number: [id number]

## 2023-12-22 NOTE — CARE PLAN
"Problem: Safety  Goal: Will remain free from injury  Outcome: PROGRESSING AS EXPECTED  Safety precautions in place. Call light within reach. Bed is low and in locked position. Hourly rounding in place.     Problem: Discharge Barriers/Planning  Goal: Patient's continuum of care needs will be met  Outcome: PROGRESSING AS EXPECTED  Awaiting SNF in [place] per SW notes.     Problem: Pain Management  Goal: Pain level will decrease to patient's comfort goal  Outcome: PROGRESSING AS EXPECTED  Pt getting Oxycodone 10 mg for pain. Pt states \"it works\". Will continue to assess.       "
Problem: Bowel/Gastric:  Goal: Normal bowel function is maintained or improved  Outcome: PROGRESSING AS EXPECTED   12/19/18 2015   OTHER   Last BM 12/19/18       Problem: Respiratory:  Goal: Respiratory status will improve    Intervention: Administer and titrate oxygen therapy  Noted desaturation to mid 80's. Placed on O2 at 2LPM per nasal cannula with latest O2 at 96%. Encouraged use of IS and DBE.        
Problem: Communication  Goal: The ability to communicate needs accurately and effectively will improve    Intervention: Educate patient and significant other/support system about the plan of care, procedures, treatments, medications and allow for questions  Discussed plan of care and answered all questions. Updated communication board. Call light within reach. Patient frequently using call light for assistance by RN and CNA.      Problem: Skin Integrity  Goal: Risk for impaired skin integrity will decrease    Intervention: Assess risk factors for impaired skin integrity and/or pressure ulcers  Patient is bedrest per MD order. Encouraging Q 2 turns and frequent repositioning; patient tolerates turns well. Provided extra pillows for support and positioning. Floating heels. Perineal care. Moisturizer and barrier cream in use.         
Problem: Infection  Goal: Will remain free from infection  Outcome: PROGRESSING AS EXPECTED  Patient is afebrile.      Problem: Pain Management  Goal: Pain level will decrease to patient's comfort goal  Outcome: PROGRESSING AS EXPECTED  Patient states relief with medication.       
Problem: Pain Management  Goal: Pain level will decrease to patient's comfort goal    Intervention: Follow pain managment plan developed in collaboration with patient and Interdisciplinary Team  Provided patient education on pain management using pain scale. Patient states pain is 8/10; medicated per MAR. Patient verbalized understanding of education and communicates pain level effectively with RN.      Problem: Psychosocial Needs:  Goal: Level of anxiety will decrease    Intervention: Identify and develop with patient strategies to cope with anxiety triggers  Patient is anxious and cries frequently. Discussed plan of care and answered all questions regarding care. Therapeutic communication in use. Distraction at bedside.         
Problem: Pain Management  Goal: Pain level will decrease to patient's comfort goal    Intervention: Follow pain managment plan developed in collaboration with patient and Interdisciplinary Team  With complaints of pain and medicated per MAR. Clustered nursing interventions to promote rest. Instructed to report if pain worsens or is unrelieved by pain medications        
Problem: Pain Management  Goal: Pain level will decrease to patient's comfort goal  Outcome: PROGRESSING AS EXPECTED  Medicated patient per MAR for pain. Encouraged multimodal interventions as well such as heat, imagery, positioning, and relaxation      
Problem: Psychosocial Needs:  Goal: Level of anxiety will decrease  Outcome: PROGRESSING SLOWER THAN EXPECTED  Cries and is anxious quite often when wanting or needing something, have to encourage patient to use call light at all times when needing something    Problem: Skin Integrity  Goal: Risk for impaired skin integrity will decrease  Outcome: PROGRESSING AS EXPECTED  Turns self in bed, no new skin issues noted      
Problem: Safety  Goal: Will remain free from falls    Intervention: Assess risk factors for falls  Fall risk assessment completed.       Problem: Pain Management  Goal: Pain level will decrease to patient's comfort goal    Intervention: Follow pain managment plan developed in collaboration with patient and Interdisciplinary Team  Medicated for pain per mar.          
Problem: Safety  Goal: Will remain free from falls    Intervention: Assess risk factors for falls  High fall risk, desk bed, bed alarm in use.      Problem: Pain Management  Goal: Pain level will decrease to patient's comfort goal    Intervention: Follow pain managment plan developed in collaboration with patient and Interdisciplinary Team  Pain well controlled with oral medicaitons        
Problem: Safety  Goal: Will remain free from falls    Intervention: Implement fall precautions  Calls appropriately. Call light and personal belongings within easy reach. Educated on level of risk. Fall precautions in place. Hourly rounding in place. Instructed to call for assistance whenever needed.      Problem: Respiratory:  Goal: Respiratory status will improve    Intervention: Educate and encourage incentive spirometry usage  On O2 at 1LPM per nasal cannula with O2 sat at 96%. IS at 1000. Encouraged DBE and use of IS. Titrated O2 as needed.        
Problem: Safety  Goal: Will remain free from falls  Call light and belongings within reach, bed down and locked, hourly roundign in progress. Treaded socks in use when OOB. Pt calls appropriately     Problem: Pain Management  Goal: Pain level will decrease to patient's comfort goal  Pt states 10/10 pain medicated with Oxycodone PRN      
Problem: Safety  Goal: Will remain free from falls  Call light and belongings within reach, bed down and locked, hourly rounding in progress. Chair/bed alarm in use.       
Problem: Safety  Goal: Will remain free from falls  Call light and belongings within reach, bed down and locked, hourly rounding in progress. Treaded socks in use.       
Problem: Safety  Goal: Will remain free from falls  Call light and belongings within reach, bed down and locked, hourly rounding within reach. Treaded socks in use, bed alarm in use.     Problem: Bowel/Gastric:  Goal: Normal bowel function is maintained or improved  Last BM 12/24    Problem: Pain Management  Goal: Pain level will decrease to patient's comfort goal  Pain controlled with scheduled tylenol and tramadol, with PRN oxycodone       
Problem: Safety  Goal: Will remain free from falls  Outcome: PROGRESSING AS EXPECTED   Treaded socks in place, bed in the lowest position, bed alarm on, call light and belongings within reach, pt call for assistance appropriately      
Problem: Safety  Goal: Will remain free from falls  Outcome: PROGRESSING AS EXPECTED  Pt instructed to call before getting OOB. Pt verbalized understanding and calls appropriately.    Problem: Pain Management  Goal: Pain level will decrease to patient's comfort goal  Outcome: PROGRESSING AS EXPECTED  Pt medicated per MAR, reports adequate pain relief with PRN oxycodone and activity.      
Problem: Safety  Goal: Will remain free from falls  Outcome: PROGRESSING AS EXPECTED  Treaded socks in place, bed in the lowest position, bed alarm on, call light and belongings within reach, pt call for assistance appropriately    Problem: Pain Management  Goal: Pain level will decrease to patient's comfort goal  Outcome: PROGRESSING AS EXPECTED  Administered pain medications per MAR, teach non pharmacological techniques such as relaxation, imagery      
Problem: Safety  Goal: Will remain free from injury    Intervention: Provide assistance with mobility  Patient assisted by staff with mobility in bed. On bedrest.       Problem: Pain Management  Goal: Pain level will decrease to patient's comfort goal    Intervention: Educate and implement non-pharmacologic comfort measures. Examples: relaxation, distration, play therapy, activity therapy, massage, etc.  Complained of pain to right hip. PRN Norco 10-325mg given with + effect.         
Problem: Safety  Goal: Will remain free from injury  Call light within reach. Pt calls for assistance at all times.  Bed in lowest position, upper bedrails up, personal possessions within reach, treaded socks on.  Hourly rounding in place.      Problem: Mobility  Goal: Risk for activity intolerance will decrease  Pt up with 1 person assist and FWW. Required frequent reminding to maintain TTWB status       
Problem: Safety  Goal: Will remain free from injury  Outcome: PROGRESSING AS EXPECTED  Fall precautions maintained. Pt calls for assistance when OOB. FWW and stand by assist; TTWB to RLE. Call light and bedside table within reach.    Problem: Pain Management  Goal: Pain level will decrease to patient's comfort goal  Outcome: PROGRESSING AS EXPECTED  Pain controlled with Oxycodone 10 mg PRN Q3 for pain and Dilaudid .5 mg PRN Q3 for break through pain.      
Problem: Safety  Goal: Will remain free from injury  Outcome: PROGRESSING AS EXPECTED  Fall precautions maintained. Pt up with stand by assist TTWB to RLE.    Problem: Pain Management  Goal: Pain level will decrease to patient's comfort goal  Outcome: PROGRESSING AS EXPECTED  Tramadol 50 mg Q6 and Tylenol 650 mg Q6. Oxycodone 5mg PRN Q3 and Dilaudid .5 mg PRN Q3 break through pain. Pain moderately controlled.      
Problem: Venous Thromboembolism (VTW)/Deep Vein Thrombosis (DVT) Prevention:  Goal: Patient will participate in Venous Thrombosis (VTE)/Deep Vein Thrombosis (DVT)Prevention Measures  Outcome: PROGRESSING AS EXPECTED        
22-Dec-2023 21:35

## 2024-01-12 NOTE — CARE PLAN
Problem: Knowledge Deficit  Goal: Knowledge of disease process/condition, treatment plan, diagnostic tests, and medications will improve  Outcome: PROGRESSING AS EXPECTED  Reviewing plan of care, activities, and medication with patient.  Encouraging patient to ask questions and participate in plan of care.  Providing answers to all questions.  Continuing with current plan of care.  Hourly rounding in practice.    Problem: Pain Management  Goal: Pain level will decrease to patient's comfort goal  Outcome: PROGRESSING AS EXPECTED  Assessing pain level frequently using 0 to 10 scale.  Providing medication per MAR.  Providing non-pharmacological intervention and therapeutic communication.         well developed, well nourished , in no acute distress , ambulating without difficulty , normal communication ability

## 2024-03-15 NOTE — OR NURSING
1123 Patient to preop via wheelchair. Up to [facility]. Vital signs taken. Consent form confirmed and signed. Patient awaits MD.   Yes

## 2024-11-24 NOTE — CARE PLAN
Problem: Safety  Goal: Will remain free from injury  Outcome: PROGRESSING AS EXPECTED  Safety precautions in use including use of call bell for assistance, bed in lowest position, bed/chair alarm utilized and use of treaded socks. Objects in room moved to allow access to bathroom    Problem: Skin Integrity  Goal: Risk for impaired skin integrity will decrease  Outcome: PROGRESSING AS EXPECTED  Pressure relieving mattress with waffle cushion in use, turned q 2 hrs, barrier cream in use.        stated

## (undated) DEVICE — DRAPE SURGICAL U 77X120 - (10/CA)

## (undated) DEVICE — CONTAINER SPECIMEN BAG OR - STERILE 4 OZ W/LID (100EA/CA)

## (undated) DEVICE — GLOVE SZ 7.5 BIOGEL PI MICRO - PF LF (50PR/BX)

## (undated) DEVICE — SUTURE 0 VICRYL PLUS CT-2 - 8 X 18 INCH (12/BX)

## (undated) DEVICE — NEPTUNE 4 PORT MANIFOLD - (20/PK)

## (undated) DEVICE — DISSECT TOOL MIDAS F2/8TA23

## (undated) DEVICE — SET EXTENSION WITH 2 PORTS (48EA/CA) ***PART #2C8610 IS A SUBSTITUTE*****

## (undated) DEVICE — SPHERE NAVIGATION STEALTH (5EA/TY 12TY/PK)

## (undated) DEVICE — DRESSING TRANSPARENT FILM TEGADERM 2.375 X 2.75"  (100EA/BX)"

## (undated) DEVICE — DRESSING XEROFORM 1X8 - (50/BX 4BX/CA)

## (undated) DEVICE — GLOVE BIOGEL SZ 8 SURGICAL PF LTX - (50PR/BX 4BX/CA)

## (undated) DEVICE — SUTURE 4-0 MONOCRYL PLUS PS-1 - 27 INCH (36/BX)

## (undated) DEVICE — CATHETER IV 14 GA X 2 ---SURG.& SDS ONLY---(200EA/CA)

## (undated) DEVICE — PATTIES SURG X-RAYCOTTONOID - 1/2 X 3 IN (200/CA)

## (undated) DEVICE — ELECTRODE 850 FOAM ADHESIVE - HYDROGEL RADIOTRNSPRNT (50/PK)

## (undated) DEVICE — GOWN WARMING STANDARD FLEX - (30/CA)

## (undated) DEVICE — ELECTRODE DUAL RETURN W/ CORD - (50/PK)

## (undated) DEVICE — GLOVE BIOGEL PI INDICATOR SZ 7.0 SURGICAL PF LF - (50/BX 4BX/CA)

## (undated) DEVICE — MASK ANESTHESIA ADULT  - (100/CA)

## (undated) DEVICE — GLOVE BIOGEL INDICATOR SZ 7SURGICAL PF LTX - (50/BX 4BX/CA)

## (undated) DEVICE — CANNULA W/ SUPPLY TUBING O2 - (50/CA)

## (undated) DEVICE — GOWN SURGEONS X-LARGE - DISP. (30/CA)

## (undated) DEVICE — SUCTION INSTRUMENT YANKAUER BULBOUS TIP W/O VENT (50EA/CA)

## (undated) DEVICE — DRAPE C-ARM LARGE 41IN X 74 IN - (10/BX 2BX/CA)

## (undated) DEVICE — BATTERY QUICKDRIVE

## (undated) DEVICE — SUTURE GENERAL

## (undated) DEVICE — GLOVE BIOGEL SZ 7 SURGICAL PF LTX - (50PR/BX 4BX/CA)

## (undated) DEVICE — BLADE SURGICAL #15 - (50/BX 3BX/CA)

## (undated) DEVICE — DRAPE U ORTHOPEDIC - (10/BX)

## (undated) DEVICE — DRAPE LARGE 3 QUARTER - (20/CA)

## (undated) DEVICE — NEEDLE SAFETY 18 GA X 1 1/2 IN (100EA/BX)

## (undated) DEVICE — GLOVE BIOGEL PI INDICATOR SZ 6.5 SURGICAL PF LF - (50/BX 4BX/CA)

## (undated) DEVICE — GLOVE BIOGEL SZ 6.5 SURGICAL PF LTX (50PR/BX 4BX/CA)

## (undated) DEVICE — GLOVE BIOGEL INDICATOR SZ 8 SURGICAL PF LTX - (50/BX 4BX/CA)

## (undated) DEVICE — GLOVE BIOGEL PI INDICATOR SZ 8.0 SURGICAL PF LF -(50/BX 4BX/CA)

## (undated) DEVICE — TOWELS CLOTH SURGICAL - (4/PK 20PK/CA)

## (undated) DEVICE — SUTURE 2-0 VICRYL PLUS CT-1 - 8 X 18 INCH(12/BX)

## (undated) DEVICE — Device

## (undated) DEVICE — TUBING CLEARLINK DUO-VENT - C-FLO (48EA/CA)

## (undated) DEVICE — NEEDLE NON SAFETY 25 GA X 1 1/2 IN HYPO (100EA/BX)

## (undated) DEVICE — CHLORAPREP 26 ML APPLICATOR - ORANGE TINT(25/CA)

## (undated) DEVICE — SODIUM CHL IRRIGATION 0.9% 1000ML (12EA/CA)

## (undated) DEVICE — PERFORATER DISP TIP DGR-0

## (undated) DEVICE — PROTECTOR ULNA NERVE - (36PR/CA)

## (undated) DEVICE — PACK MAJOR ORTHO - (2EA/CA)

## (undated) DEVICE — GLOVE, LITE (PAIR)

## (undated) DEVICE — CANISTER SUCTION 3000ML MECHANICAL FILTER AUTO SHUTOFF MEDI-VAC NONSTERILE LF DISP  (40EA/CA)

## (undated) DEVICE — GLOVE BIOGEL INDICATOR SZ 6.5 SURGICAL PF LTX - (50PR/BX 4BX/CA)

## (undated) DEVICE — BURETTE N-VENT 150 X 60 (SOLUSET)  (48EA/CA)

## (undated) DEVICE — SLEEVE, VASO, THIGH, MED

## (undated) DEVICE — DRESSING NON-ADHERING 8 X 3 - (50/BX)

## (undated) DEVICE — HEAD HOLDER JUNIOR/ADULT

## (undated) DEVICE — CORDS BIPOLAR COAGULATION - 12FT STERILE DISP. (10EA/BX)

## (undated) DEVICE — KIT GEL-FLOW NT ABORBABLE GELATIN (6EA/BX)

## (undated) DEVICE — PATTIES SURG X-RAYCOTTONOID - 1 X 3 IN (200/CA)

## (undated) DEVICE — DETERGENT RENUZYME PLUS 10 OZ PACKET (50/BX)

## (undated) DEVICE — SENSOR SPO2 NEO LNCS ADHESIVE (20/BX) SEE USER NOTES

## (undated) DEVICE — KIT ANESTHESIA W/CIRCUIT & 3/LT BAG W/FILTER (20EA/CA)

## (undated) DEVICE — BLADE SURGICAL CLIPPER - (50EA/CA)

## (undated) DEVICE — COVER PROBE STERILE CONE (12EA/CA)

## (undated) DEVICE — STERI STRIP COMPOUND BENZOIN - TINCTURE 0.6ML WITH APPLICATOR (40EA/BX)

## (undated) DEVICE — DRESSING TRANSPARENT FILM TEGADERM 4 X 4.75" (50EA/BX)"

## (undated) DEVICE — LACTATED RINGERS INJ 1000 ML - (14EA/CA 60CA/PF)

## (undated) DEVICE — GLOVE BIOGEL SZ 7.5 SURGICAL PF LTX - (50PR/BX 4BX/CA)

## (undated) DEVICE — SYRINGE 10 ML CONTROL LL (25EA/BX 4BX/CA)

## (undated) DEVICE — SET LEADWIRE 5 LEAD BEDSIDE DISPOSABLE ECG (1SET OF 5/EA)

## (undated) DEVICE — DRILL BIT CANNULATED 5.0MM (3TX1=3)

## (undated) DEVICE — SUTURE 4-0 NUROLON CR/8 TF - (12/BX) ETHICON

## (undated) DEVICE — SCALP CLIP RANEY 20-1037 (10EA/PK 20PK/CA)

## (undated) DEVICE — BOVIE BLADE COATED - (50/PK)

## (undated) DEVICE — SURGIFOAM (SIZE 100) - (6EA/CA)

## (undated) DEVICE — STAPLER SKIN DISP - (6/BX 10BX/CA) VISISTAT

## (undated) DEVICE — PACK CRANI - (1EA/CA)

## (undated) DEVICE — KIT ROOM DECONTAMINATION

## (undated) DEVICE — FORCEP BIPOLAR ISOCOOL 8.5 1.0MM TIP"

## (undated) DEVICE — PACK MAJOR BASIN - (2EA/CA)

## (undated) DEVICE — BLANKET WARMING FULL BODY - (10/CA)

## (undated) DEVICE — HEMOSTAT SURG ABSORBABLE - 4 X 8 IN SURGICEL (24EA/CA)

## (undated) DEVICE — GUIDEPIN FOR 7.3MM CANNULATED SCREWS 2.8MM X 450MM (3TX6=18)(6EA/PK)

## (undated) DEVICE — GLOVE BIOGEL SZ 8.5 SURGICAL PF LTX - (50PR/BX 4BX/CA)